# Patient Record
Sex: FEMALE | Race: WHITE | NOT HISPANIC OR LATINO | Employment: OTHER | URBAN - METROPOLITAN AREA
[De-identification: names, ages, dates, MRNs, and addresses within clinical notes are randomized per-mention and may not be internally consistent; named-entity substitution may affect disease eponyms.]

---

## 2017-03-21 ENCOUNTER — ALLSCRIPTS OFFICE VISIT (OUTPATIENT)
Dept: OTHER | Facility: OTHER | Age: 77
End: 2017-03-21

## 2017-03-21 DIAGNOSIS — I35.0 NONRHEUMATIC AORTIC VALVE STENOSIS: ICD-10-CM

## 2017-03-21 DIAGNOSIS — Z01.810 ENCOUNTER FOR PREPROCEDURAL CARDIOVASCULAR EXAMINATION: ICD-10-CM

## 2017-03-29 ENCOUNTER — HOSPITAL ENCOUNTER (OUTPATIENT)
Dept: NON INVASIVE DIAGNOSTICS | Facility: CLINIC | Age: 77
Discharge: HOME | End: 2017-03-29
Payer: MEDICARE

## 2017-03-29 DIAGNOSIS — I35.0 NONRHEUMATIC AORTIC VALVE STENOSIS: ICD-10-CM

## 2017-03-29 DIAGNOSIS — Z01.810 ENCOUNTER FOR PREPROCEDURAL CARDIOVASCULAR EXAMINATION: ICD-10-CM

## 2017-03-29 PROCEDURE — 93308 TTE F-UP OR LMTD: CPT

## 2017-03-30 ENCOUNTER — GENERIC CONVERSION - ENCOUNTER (OUTPATIENT)
Dept: OTHER | Facility: OTHER | Age: 77
End: 2017-03-30

## 2017-06-21 ENCOUNTER — ALLSCRIPTS OFFICE VISIT (OUTPATIENT)
Dept: OTHER | Facility: OTHER | Age: 77
End: 2017-06-21

## 2017-09-05 ENCOUNTER — APPOINTMENT (EMERGENCY)
Dept: RADIOLOGY | Facility: HOSPITAL | Age: 77
End: 2017-09-05
Payer: MEDICARE

## 2017-09-05 ENCOUNTER — HOSPITAL ENCOUNTER (EMERGENCY)
Facility: HOSPITAL | Age: 77
Discharge: HOME | End: 2017-09-05
Attending: EMERGENCY MEDICINE | Admitting: EMERGENCY MEDICINE
Payer: MEDICARE

## 2017-09-05 VITALS
SYSTOLIC BLOOD PRESSURE: 157 MMHG | BODY MASS INDEX: 49.99 KG/M2 | OXYGEN SATURATION: 96 % | WEIGHT: 216 LBS | RESPIRATION RATE: 16 BRPM | TEMPERATURE: 97.8 F | DIASTOLIC BLOOD PRESSURE: 74 MMHG | HEIGHT: 55 IN | HEART RATE: 67 BPM

## 2017-09-05 DIAGNOSIS — T14.8XXA MUSCLE STRAIN: ICD-10-CM

## 2017-09-05 DIAGNOSIS — M25.519 SHOULDER PAIN: Primary | ICD-10-CM

## 2017-09-05 DIAGNOSIS — R03.0 ELEVATED BLOOD PRESSURE READING: ICD-10-CM

## 2017-09-05 LAB
ALBUMIN SERPL BCP-MCNC: 3.4 G/DL (ref 3.5–5)
ALP SERPL-CCNC: 78 U/L (ref 46–116)
ALT SERPL W P-5'-P-CCNC: 24 U/L (ref 12–78)
ANION GAP SERPL CALCULATED.3IONS-SCNC: 9 MMOL/L (ref 4–13)
AST SERPL W P-5'-P-CCNC: 18 U/L (ref 5–45)
BASOPHILS # BLD AUTO: 0.05 THOUSANDS/ΜL (ref 0–0.1)
BASOPHILS NFR BLD AUTO: 1 % (ref 0–1)
BILIRUB SERPL-MCNC: 0.4 MG/DL (ref 0.2–1)
BUN SERPL-MCNC: 14 MG/DL (ref 5–25)
CALCIUM SERPL-MCNC: 9.8 MG/DL (ref 8.3–10.1)
CHLORIDE SERPL-SCNC: 104 MMOL/L (ref 100–108)
CO2 SERPL-SCNC: 28 MMOL/L (ref 21–32)
CREAT SERPL-MCNC: 0.8 MG/DL (ref 0.6–1.3)
EOSINOPHIL # BLD AUTO: 0.48 THOUSAND/ΜL (ref 0–0.61)
EOSINOPHIL NFR BLD AUTO: 5 % (ref 0–6)
ERYTHROCYTE [DISTWIDTH] IN BLOOD BY AUTOMATED COUNT: 18.7 % (ref 11.6–15.1)
GFR SERPL CREATININE-BSD FRML MDRD: 71 ML/MIN/1.73SQ M
GLUCOSE SERPL-MCNC: 110 MG/DL (ref 65–140)
HCT VFR BLD AUTO: 39 % (ref 34.8–46.1)
HGB BLD-MCNC: 12 G/DL (ref 11.5–15.4)
LIPASE SERPL-CCNC: 74 U/L (ref 73–393)
LYMPHOCYTES # BLD AUTO: 1.81 THOUSANDS/ΜL (ref 0.6–4.47)
LYMPHOCYTES NFR BLD AUTO: 19 % (ref 14–44)
MCH RBC QN AUTO: 24.5 PG (ref 26.8–34.3)
MCHC RBC AUTO-ENTMCNC: 30.8 G/DL (ref 31.4–37.4)
MCV RBC AUTO: 80 FL (ref 82–98)
MONOCYTES # BLD AUTO: 1 THOUSAND/ΜL (ref 0.17–1.22)
MONOCYTES NFR BLD AUTO: 10 % (ref 4–12)
NEUTROPHILS # BLD AUTO: 6.41 THOUSANDS/ΜL (ref 1.85–7.62)
NEUTS SEG NFR BLD AUTO: 66 % (ref 43–75)
NRBC BLD AUTO-RTO: 0 /100 WBCS
PLATELET # BLD AUTO: 356 THOUSANDS/UL (ref 149–390)
PMV BLD AUTO: 9.3 FL (ref 8.9–12.7)
POTASSIUM SERPL-SCNC: 3.9 MMOL/L (ref 3.5–5.3)
PROT SERPL-MCNC: 7.3 G/DL (ref 6.4–8.2)
RBC # BLD AUTO: 4.9 MILLION/UL (ref 3.81–5.12)
SODIUM SERPL-SCNC: 141 MMOL/L (ref 136–145)
TROPONIN I SERPL-MCNC: <0.02 NG/ML
TROPONIN I SERPL-MCNC: <0.02 NG/ML
WBC # BLD AUTO: 9.78 THOUSAND/UL (ref 4.31–10.16)

## 2017-09-05 PROCEDURE — 36415 COLL VENOUS BLD VENIPUNCTURE: CPT | Performed by: EMERGENCY MEDICINE

## 2017-09-05 PROCEDURE — 99285 EMERGENCY DEPT VISIT HI MDM: CPT

## 2017-09-05 PROCEDURE — 93005 ELECTROCARDIOGRAM TRACING: CPT | Performed by: EMERGENCY MEDICINE

## 2017-09-05 PROCEDURE — 71020 HB CHEST X-RAY 2VW FRONTAL&LATL: CPT

## 2017-09-05 PROCEDURE — 85025 COMPLETE CBC W/AUTO DIFF WBC: CPT | Performed by: EMERGENCY MEDICINE

## 2017-09-05 PROCEDURE — 84484 ASSAY OF TROPONIN QUANT: CPT | Performed by: EMERGENCY MEDICINE

## 2017-09-05 PROCEDURE — 80053 COMPREHEN METABOLIC PANEL: CPT | Performed by: EMERGENCY MEDICINE

## 2017-09-05 PROCEDURE — 83690 ASSAY OF LIPASE: CPT | Performed by: EMERGENCY MEDICINE

## 2017-09-05 RX ORDER — IBUPROFEN 400 MG/1
TABLET ORAL
Status: COMPLETED
Start: 2017-09-05 | End: 2017-09-05

## 2017-09-05 RX ADMIN — IBUPROFEN 400 MG: 400 TABLET ORAL at 13:35

## 2017-09-06 LAB
ATRIAL RATE: 71 BPM
P AXIS: 44 DEGREES
PR INTERVAL: 194 MS
QRS AXIS: -14 DEGREES
QRSD INTERVAL: 84 MS
QT INTERVAL: 394 MS
QTC INTERVAL: 428 MS
T WAVE AXIS: 57 DEGREES
VENTRICULAR RATE: 71 BPM

## 2017-10-29 ENCOUNTER — APPOINTMENT (EMERGENCY)
Dept: RADIOLOGY | Facility: HOSPITAL | Age: 77
End: 2017-10-29
Payer: MEDICARE

## 2017-10-29 ENCOUNTER — APPOINTMENT (EMERGENCY)
Dept: CT IMAGING | Facility: HOSPITAL | Age: 77
End: 2017-10-29
Payer: MEDICARE

## 2017-10-29 ENCOUNTER — HOSPITAL ENCOUNTER (OUTPATIENT)
Facility: HOSPITAL | Age: 77
Setting detail: OBSERVATION
End: 2017-10-29
Attending: EMERGENCY MEDICINE | Admitting: FAMILY MEDICINE
Payer: MEDICARE

## 2017-10-29 ENCOUNTER — HOSPITAL ENCOUNTER (OUTPATIENT)
Facility: HOSPITAL | Age: 77
Setting detail: OBSERVATION
Discharge: HOME | End: 2017-10-30
Attending: INTERNAL MEDICINE | Admitting: INTERNAL MEDICINE
Payer: MEDICARE

## 2017-10-29 VITALS
RESPIRATION RATE: 20 BRPM | HEART RATE: 74 BPM | BODY MASS INDEX: 49.74 KG/M2 | WEIGHT: 214.95 LBS | DIASTOLIC BLOOD PRESSURE: 79 MMHG | TEMPERATURE: 97.9 F | OXYGEN SATURATION: 94 % | SYSTOLIC BLOOD PRESSURE: 167 MMHG | HEIGHT: 55 IN

## 2017-10-29 DIAGNOSIS — R77.8 ELEVATED TROPONIN I LEVEL: Primary | ICD-10-CM

## 2017-10-29 DIAGNOSIS — R07.9 CHEST PAIN: ICD-10-CM

## 2017-10-29 DIAGNOSIS — R06.02 SHORTNESS OF BREATH: Primary | ICD-10-CM

## 2017-10-29 PROBLEM — E03.9 HYPOTHYROID: Chronic | Status: ACTIVE | Noted: 2017-10-29

## 2017-10-29 PROBLEM — K21.9 GERD (GASTROESOPHAGEAL REFLUX DISEASE): Chronic | Status: ACTIVE | Noted: 2017-10-29

## 2017-10-29 LAB
ALBUMIN SERPL BCP-MCNC: 3.2 G/DL (ref 3.5–5)
ALP SERPL-CCNC: 97 U/L (ref 46–116)
ALT SERPL W P-5'-P-CCNC: 32 U/L (ref 12–78)
ANION GAP SERPL CALCULATED.3IONS-SCNC: 9 MMOL/L (ref 4–13)
AST SERPL W P-5'-P-CCNC: 28 U/L (ref 5–45)
BASOPHILS # BLD AUTO: 0.04 THOUSANDS/ΜL (ref 0–0.1)
BASOPHILS NFR BLD AUTO: 0 % (ref 0–1)
BILIRUB SERPL-MCNC: 0.3 MG/DL (ref 0.2–1)
BUN SERPL-MCNC: 22 MG/DL (ref 5–25)
CALCIUM SERPL-MCNC: 9.9 MG/DL (ref 8.3–10.1)
CHLORIDE SERPL-SCNC: 107 MMOL/L (ref 100–108)
CO2 SERPL-SCNC: 28 MMOL/L (ref 21–32)
CREAT SERPL-MCNC: 0.73 MG/DL (ref 0.6–1.3)
DEPRECATED D DIMER PPP: 716 NG/ML (FEU) (ref 0–424)
EOSINOPHIL # BLD AUTO: 0.11 THOUSAND/ΜL (ref 0–0.61)
EOSINOPHIL NFR BLD AUTO: 1 % (ref 0–6)
ERYTHROCYTE [DISTWIDTH] IN BLOOD BY AUTOMATED COUNT: 18.2 % (ref 11.6–15.1)
GFR SERPL CREATININE-BSD FRML MDRD: 80 ML/MIN/1.73SQ M
GLUCOSE SERPL-MCNC: 124 MG/DL (ref 65–140)
HCT VFR BLD AUTO: 40.8 % (ref 34.8–46.1)
HGB BLD-MCNC: 12.6 G/DL (ref 11.5–15.4)
LYMPHOCYTES # BLD AUTO: 2.7 THOUSANDS/ΜL (ref 0.6–4.47)
LYMPHOCYTES NFR BLD AUTO: 23 % (ref 14–44)
MCH RBC QN AUTO: 24.8 PG (ref 26.8–34.3)
MCHC RBC AUTO-ENTMCNC: 30.9 G/DL (ref 31.4–37.4)
MCV RBC AUTO: 80 FL (ref 82–98)
MONOCYTES # BLD AUTO: 1.4 THOUSAND/ΜL (ref 0.17–1.22)
MONOCYTES NFR BLD AUTO: 12 % (ref 4–12)
NEUTROPHILS # BLD AUTO: 7.63 THOUSANDS/ΜL (ref 1.85–7.62)
NEUTS SEG NFR BLD AUTO: 64 % (ref 43–75)
NRBC BLD AUTO-RTO: 0 /100 WBCS
NT-PROBNP SERPL-MCNC: 622 PG/ML
NT-PROBNP SERPL-MCNC: 735 PG/ML
PLATELET # BLD AUTO: 395 THOUSANDS/UL (ref 149–390)
PLATELET # BLD AUTO: 415 THOUSANDS/UL (ref 149–390)
PMV BLD AUTO: 9.4 FL (ref 8.9–12.7)
PMV BLD AUTO: 9.5 FL (ref 8.9–12.7)
POTASSIUM SERPL-SCNC: 3.6 MMOL/L (ref 3.5–5.3)
PROT SERPL-MCNC: 7.5 G/DL (ref 6.4–8.2)
RBC # BLD AUTO: 5.08 MILLION/UL (ref 3.81–5.12)
SODIUM SERPL-SCNC: 144 MMOL/L (ref 136–145)
TROPONIN I SERPL-MCNC: 0.04 NG/ML
TROPONIN I SERPL-MCNC: 0.05 NG/ML
TROPONIN I SERPL-MCNC: <0.02 NG/ML
WBC # BLD AUTO: 11.94 THOUSAND/UL (ref 4.31–10.16)

## 2017-10-29 PROCEDURE — 71020 HB CHEST X-RAY 2VW FRONTAL&LATL: CPT

## 2017-10-29 PROCEDURE — 99285 EMERGENCY DEPT VISIT HI MDM: CPT

## 2017-10-29 PROCEDURE — 85025 COMPLETE CBC W/AUTO DIFF WBC: CPT | Performed by: EMERGENCY MEDICINE

## 2017-10-29 PROCEDURE — 83880 ASSAY OF NATRIURETIC PEPTIDE: CPT | Performed by: PHYSICIAN ASSISTANT

## 2017-10-29 PROCEDURE — 83880 ASSAY OF NATRIURETIC PEPTIDE: CPT | Performed by: EMERGENCY MEDICINE

## 2017-10-29 PROCEDURE — 93005 ELECTROCARDIOGRAM TRACING: CPT | Performed by: EMERGENCY MEDICINE

## 2017-10-29 PROCEDURE — 71275 CT ANGIOGRAPHY CHEST: CPT

## 2017-10-29 PROCEDURE — 93005 ELECTROCARDIOGRAM TRACING: CPT

## 2017-10-29 PROCEDURE — 84484 ASSAY OF TROPONIN QUANT: CPT | Performed by: EMERGENCY MEDICINE

## 2017-10-29 PROCEDURE — 85379 FIBRIN DEGRADATION QUANT: CPT | Performed by: EMERGENCY MEDICINE

## 2017-10-29 PROCEDURE — 80053 COMPREHEN METABOLIC PANEL: CPT | Performed by: EMERGENCY MEDICINE

## 2017-10-29 PROCEDURE — 85049 AUTOMATED PLATELET COUNT: CPT | Performed by: PHYSICIAN ASSISTANT

## 2017-10-29 PROCEDURE — 36415 COLL VENOUS BLD VENIPUNCTURE: CPT | Performed by: EMERGENCY MEDICINE

## 2017-10-29 PROCEDURE — 84484 ASSAY OF TROPONIN QUANT: CPT | Performed by: PHYSICIAN ASSISTANT

## 2017-10-29 RX ORDER — ASPIRIN 81 MG/1
324 TABLET, CHEWABLE ORAL ONCE
Status: COMPLETED | OUTPATIENT
Start: 2017-10-29 | End: 2017-10-29

## 2017-10-29 RX ORDER — PANTOPRAZOLE SODIUM 20 MG/1
20 TABLET, DELAYED RELEASE ORAL
Status: DISCONTINUED | OUTPATIENT
Start: 2017-10-30 | End: 2017-10-31 | Stop reason: HOSPADM

## 2017-10-29 RX ORDER — PHENOL 1.4 %
1 AEROSOL, SPRAY (ML) MUCOUS MEMBRANE 2 TIMES DAILY
COMMUNITY
End: 2018-08-30 | Stop reason: ALTCHOICE

## 2017-10-29 RX ORDER — ASPIRIN 325 MG
325 TABLET ORAL ONCE
Status: COMPLETED | OUTPATIENT
Start: 2017-10-29 | End: 2017-10-29

## 2017-10-29 RX ORDER — LEVOTHYROXINE SODIUM 0.05 MG/1
50 TABLET ORAL DAILY
COMMUNITY
End: 2019-01-30 | Stop reason: SDUPTHER

## 2017-10-29 RX ORDER — ALBUTEROL SULFATE 2.5 MG/3ML
2.5 SOLUTION RESPIRATORY (INHALATION) EVERY 6 HOURS PRN
Status: DISCONTINUED | OUTPATIENT
Start: 2017-10-29 | End: 2017-10-31 | Stop reason: HOSPADM

## 2017-10-29 RX ORDER — VITAMIN B COMPLEX
1 CAPSULE ORAL 2 TIMES DAILY
COMMUNITY

## 2017-10-29 RX ORDER — LORATADINE 10 MG/1
10 TABLET ORAL DAILY
COMMUNITY
End: 2019-10-15 | Stop reason: SDUPTHER

## 2017-10-29 RX ORDER — LEVOTHYROXINE SODIUM 0.05 MG/1
50 TABLET ORAL
Status: DISCONTINUED | OUTPATIENT
Start: 2017-10-30 | End: 2017-10-31 | Stop reason: HOSPADM

## 2017-10-29 RX ORDER — MAGNESIUM HYDROXIDE/ALUMINUM HYDROXICE/SIMETHICONE 120; 1200; 1200 MG/30ML; MG/30ML; MG/30ML
30 SUSPENSION ORAL ONCE
Status: COMPLETED | OUTPATIENT
Start: 2017-10-29 | End: 2017-10-29

## 2017-10-29 RX ORDER — OMEPRAZOLE 20 MG/1
40 TABLET, DELAYED RELEASE ORAL 2 TIMES DAILY
COMMUNITY
End: 2018-07-03

## 2017-10-29 RX ORDER — SUCRALFATE ORAL 1 G/10ML
1000 SUSPENSION ORAL EVERY 6 HOURS SCHEDULED
Status: DISCONTINUED | OUTPATIENT
Start: 2017-10-30 | End: 2017-10-31 | Stop reason: HOSPADM

## 2017-10-29 RX ORDER — CALCIUM CARBONATE 500(1250)
1 TABLET ORAL
Status: DISCONTINUED | OUTPATIENT
Start: 2017-10-30 | End: 2017-10-31 | Stop reason: HOSPADM

## 2017-10-29 RX ORDER — MAGNESIUM HYDROXIDE/ALUMINUM HYDROXICE/SIMETHICONE 120; 1200; 1200 MG/30ML; MG/30ML; MG/30ML
30 SUSPENSION ORAL EVERY 4 HOURS PRN
Status: DISCONTINUED | OUTPATIENT
Start: 2017-10-29 | End: 2017-10-31 | Stop reason: HOSPADM

## 2017-10-29 RX ORDER — ACETAMINOPHEN 325 MG/1
650 TABLET ORAL EVERY 6 HOURS PRN
Status: DISCONTINUED | OUTPATIENT
Start: 2017-10-29 | End: 2017-10-31 | Stop reason: HOSPADM

## 2017-10-29 RX ORDER — ONDANSETRON 2 MG/ML
4 INJECTION INTRAMUSCULAR; INTRAVENOUS EVERY 6 HOURS PRN
Status: DISCONTINUED | OUTPATIENT
Start: 2017-10-29 | End: 2017-10-31 | Stop reason: HOSPADM

## 2017-10-29 RX ORDER — LORATADINE 10 MG/1
10 TABLET ORAL DAILY
Status: DISCONTINUED | OUTPATIENT
Start: 2017-10-30 | End: 2017-10-31 | Stop reason: HOSPADM

## 2017-10-29 RX ADMIN — ASPIRIN 81 MG 324 MG: 81 TABLET ORAL at 18:25

## 2017-10-29 RX ADMIN — ALUMINUM HYDROXIDE, MAGNESIUM HYDROXIDE, AND SIMETHICONE 30 ML: 200; 200; 20 SUSPENSION ORAL at 11:15

## 2017-10-29 RX ADMIN — ALUMINUM HYDROXIDE, MAGNESIUM HYDROXIDE, AND SIMETHICONE 30 ML: 200; 200; 20 SUSPENSION ORAL at 23:03

## 2017-10-29 RX ADMIN — IOHEXOL 85 ML: 350 INJECTION, SOLUTION INTRAVENOUS at 12:18

## 2017-10-29 RX ADMIN — SUCRALFATE 1000 MG: 1 SUSPENSION ORAL at 23:32

## 2017-10-29 RX ADMIN — ASPIRIN 325 MG: 325 TABLET ORAL at 23:03

## 2017-10-29 NOTE — ED PROVIDER NOTES
History  Chief Complaint   Patient presents with    Chest Pain     pt states "when i breaths, it hurst and burns in my throat and my chest " Pt was seen at urgent care for an ear problem and was put on prednisone  Pt states chest pain began this morning  Pt has hx of  "leaky valve "      A 58-year-old female presents for evaluation of shortness of breath that started shortly prior to presentation  The patient complains of substernal, central chest pain that is described as a burning sensation  Patient notes that this radiates towards her throat and was associated with shortness of breath  Patient denies diaphoresis or nausea  No numbness or weakness  Patient states that this pain was mild to moderate intensity  It lasted for about 10 to 15 minutes before resolving spontaneously  She is not currently having chest pain  She has no history of coronary artery disease  She does have a history of hypothyroidism  She is concerned that she was recently started on prednisone for a red ear    She is afraid that her symptoms may be a side effect of prednisone  She has been tolerating solids and liquids by mouth  She has been urinating normally and having normal bowel movements  History provided by:  Patient   used: No    Chest Pain   Pain location:  Substernal area  Pain quality: burning    Pain radiates to:  Does not radiate  Pain radiates to the back: no    Pain severity:  Mild  Onset quality:  Sudden  Timing:  Intermittent  Chronicity:  New  Relieved by:  Nothing  Worsened by:  Exertion  Ineffective treatments:  Rest  Associated symptoms: shortness of breath    Associated symptoms: no abdominal pain, no back pain, no cough, no dizziness, no fatigue, no fever, no headache, no nausea, no numbness and not vomiting        Prior to Admission Medications   Prescriptions Last Dose Informant Patient Reported?  Taking?   levothyroxine 50 mcg tablet   Yes Yes   Sig: Take 50 mcg by mouth daily loratadine (CLARITIN) 10 mg tablet   Yes Yes   Sig: Take 10 mg by mouth daily      Facility-Administered Medications: None       Past Medical History:   Diagnosis Date    Cardiac disease     "leaky valve"    Disease of thyroid gland     Murmur, cardiac        Past Surgical History:   Procedure Laterality Date    CHOLECYSTECTOMY      JOINT REPLACEMENT      b/l knee        History reviewed  No pertinent family history  I have reviewed and agree with the history as documented  Social History   Substance Use Topics    Smoking status: Never Smoker    Smokeless tobacco: Not on file    Alcohol use No        Review of Systems   Constitutional: Negative for activity change, appetite change, fatigue, fever and unexpected weight change  HENT: Negative for congestion, hearing loss, rhinorrhea, sore throat and voice change  Eyes: Negative for pain and visual disturbance  Respiratory: Positive for shortness of breath  Negative for cough and chest tightness  Cardiovascular: Positive for chest pain  Gastrointestinal: Negative for abdominal pain, blood in stool, diarrhea, nausea and vomiting  Endocrine: Negative for polyphagia and polyuria  Genitourinary: Negative for difficulty urinating, dysuria, flank pain, frequency and urgency  Musculoskeletal: Negative for back pain, gait problem, neck pain and neck stiffness  Skin: Negative for color change and rash  Allergic/Immunologic: Negative for immunocompromised state  Neurological: Negative for dizziness, syncope, speech difficulty, light-headedness, numbness and headaches  Hematological: Does not bruise/bleed easily  Psychiatric/Behavioral: Negative for confusion and suicidal ideas         Physical Exam  ED Triage Vitals   Temperature Pulse Respirations Blood Pressure SpO2   10/29/17 0948 10/29/17 0942 10/29/17 0942 10/29/17 0948 10/29/17 0942   97 9 °F (36 6 °C) 98 20 165/79 93 %      Temp Source Heart Rate Source Patient Position - Orthostatic VS BP Location FiO2 (%)   10/29/17 0948 10/29/17 0942 10/29/17 0942 10/29/17 0942 --   Oral Monitor Lying Right arm       Pain Score       --                  Orthostatic Vital Signs  Vitals:    10/29/17 0942 10/29/17 0948 10/29/17 1330 10/29/17 1730   BP:  165/79 160/78 167/79   Pulse: 98  63 74   Patient Position - Orthostatic VS: Lying          Physical Exam   Constitutional: She is oriented to person, place, and time  She appears well-developed and well-nourished  HENT:   Head: Normocephalic and atraumatic  Eyes: Conjunctivae and EOM are normal  Pupils are equal, round, and reactive to light  No scleral icterus  Neck: Normal range of motion  Neck supple  No tracheal deviation present  Cardiovascular: Normal rate and regular rhythm  Pulmonary/Chest: Effort normal and breath sounds normal  No respiratory distress  Abdominal: Soft  She exhibits no distension  There is no tenderness  Musculoskeletal: Normal range of motion  She exhibits no tenderness or deformity  Lymphadenopathy:     She has no cervical adenopathy  Neurological: She is alert and oriented to person, place, and time  No gross focal sensory or motor deficits   Skin: Skin is warm and dry  No rash noted  She is not diaphoretic  Psychiatric: She has a normal mood and affect  Vitals reviewed        ED Medications  Medications   aluminum-magnesium hydroxide-simethicone (MYLANTA) 200-200-20 mg/5 mL oral suspension 30 mL (30 mL Oral Given 10/29/17 1115)   iohexol (OMNIPAQUE) 350 MG/ML injection (MULTI-DOSE) 85 mL (85 mL Intravenous Given 10/29/17 1218)   aspirin chewable tablet 324 mg (324 mg Oral Given 10/29/17 1825)       Diagnostic Studies  Results Reviewed     Procedure Component Value Units Date/Time    Troponin I [[id number]]  (Abnormal) Collected:  10/29/17 1434    Lab Status:  Final result Specimen:  Blood from Arm, Right Updated:  10/29/17 1456     Troponin I 0 05 (H) ng/mL     Narrative:         [employer] analyzer 99% cutoff is > 0 04 ng/mL in network labs    o cTnI 99% cutoff is useful only when applied to patients in the clinical setting of myocardial ischemia  o cTnI 99% cutoff should be interpreted in the context of clinical history, ECG findings and possibly cardiac imaging to establish correct diagnosis  o cTnI 99% cutoff may be suggestive but clearly not indicative of a coronary event without the clinical setting of myocardial ischemia  B-type natriuretic peptide [[id number]]  (Abnormal) Collected:  10/29/17 1102    Lab Status:  Final result Specimen:  Blood from Arm, Right Updated:  10/29/17 1134     NT-proBNP 622 (H) pg/mL     Troponin I [[id number]]  (Normal) Collected:  10/29/17 1102    Lab Status:  Final result Specimen:  Blood from Arm, Right Updated:  10/29/17 1131     Troponin I 0 04 ng/mL     Narrative:         [employer] analyzer 99% cutoff is > 0 04 ng/mL in network labs    o cTnI 99% cutoff is useful only when applied to patients in the clinical setting of myocardial ischemia  o cTnI 99% cutoff should be interpreted in the context of clinical history, ECG findings and possibly cardiac imaging to establish correct diagnosis  o cTnI 99% cutoff may be suggestive but clearly not indicative of a coronary event without the clinical setting of myocardial ischemia      Comprehensive metabolic panel [[id number]]  (Abnormal) Collected:  10/29/17 1102    Lab Status:  Final result Specimen:  Blood from Arm, Right Updated:  10/29/17 1129     Sodium 144 mmol/L      Potassium 3 6 mmol/L      Chloride 107 mmol/L      CO2 28 mmol/L      Anion Gap 9 mmol/L      BUN 22 mg/dL      Creatinine 0 73 mg/dL      Glucose 124 mg/dL      Calcium 9 9 mg/dL      AST 28 U/L      ALT 32 U/L      Alkaline Phosphatase 97 U/L      Total Protein 7 5 g/dL      Albumin 3 2 (L) g/dL      Total Bilirubin 0 30 mg/dL      eGFR 80 ml/min/1 73sq m     Narrative:         National Kidney Disease Education Program recommendations are as follows:  GFR calculation is accurate only with a steady state creatinine  Chronic Kidney disease less than 60 ml/min/1 73 sq  meters  Kidney failure less than 15 ml/min/1 73 sq  meters  D-Dimer [[id number]]  (Abnormal) Collected:  10/29/17 1108    Lab Status:  Final result Specimen:  Blood Updated:  10/29/17 1126     D-Dimer, Quant 716 (H) ng/ml (FEU)     CBC and differential [[id number]]  (Abnormal) Collected:  10/29/17 1102    Lab Status:  Final result Specimen:  Blood from Arm, Right Updated:  10/29/17 1112     WBC 11 94 (H) Thousand/uL      RBC 5 08 Million/uL      Hemoglobin 12 6 g/dL      Hematocrit 40 8 %      MCV 80 (L) fL      MCH 24 8 (L) pg      MCHC 30 9 (L) g/dL      RDW 18 2 (H) %      MPV 9 5 fL      Platelets 366 (H) Thousands/uL      nRBC 0 /100 WBCs      Neutrophils Relative 64 %      Lymphocytes Relative 23 %      Monocytes Relative 12 %      Eosinophils Relative 1 %      Basophils Relative 0 %      Neutrophils Absolute 7 63 (H) Thousands/µL      Lymphocytes Absolute 2 70 Thousands/µL      Monocytes Absolute 1 40 (H) Thousand/µL      Eosinophils Absolute 0 11 Thousand/µL      Basophils Absolute 0 04 Thousands/µL                  CTA ED chest PE study   Final Result by Qiana Hernandez MD (10/29 1330)      No pulmonary embolus  Workstation performed: [id number]         XR chest 2 views   Final Result by Lynnda Merlin, MD (10/29 1140)      No active pulmonary disease  Workstation performed: [id number]                    Procedures  Procedures       Phone Contacts  ED Phone Contact    ED Course  ED Course                                MDM  Number of Diagnoses or Management Options  Chest pain: new and requires workup  Elevated troponin I level: new and requires workup  Diagnosis management comments: 62-year-old female presented for evaluation of chest pain    Initial EKG showed no acute ST changes initial troponin was at the upper limits of normal  A D-dimer was noted to be elevated and so CTA of the chest was performed to rule out pulmonary embolism  This was unremarkable  A repeat troponin at a 2 to 3 hour interval was noted to be mildly elevated  Because of this the patient was admitted to the hospital for observation formal consult by cardiology    At her current       Amount and/or Complexity of Data Reviewed  Clinical lab tests: reviewed and ordered  Tests in the radiology section of CPT®: reviewed and ordered  Review and summarize past medical records: yes  Independent visualization of images, tracings, or specimens: yes      CritCare Time    Disposition  Final diagnoses:   Elevated troponin I level   Chest pain     Time reflects when diagnosis was documented in both MDM as applicable and the Disposition within this note     Time User Action Codes Description Comment    10/29/2017  3:43 PM Tami Lopez SREEKANTH Add [R74 8] Elevated troponin I level     10/29/2017  3:43 PM Kevin Mele Add [R07 9] Chest pain       ED Disposition     ED Disposition Condition Comment    Transfer to Another Facility  Transfer to [address] [place] [address] Most Recent Value   Patient Condition  The patient has been stabilized such that within reasonable medical probability, no material deterioration of the patient condition or the condition of the unborn child(tuyet) is likely to result from the transfer   Reason for Transfer  No bed available at level of patient's needs   Benefits of Transfer  Patient preference   Risks of Transfer  Loss of IV, Increased discomfort during transfer   Accepting Physician  [address] [place] [address] Name, Earl Mckee      RN Documentation    [address] Name, Earl Salter      Follow-up Information    None       Discharge Medication List as of 10/29/2017  7:22 PM      CONTINUE these medications which have NOT CHANGED    Details   levothyroxine 50 mcg tablet Take 50 mcg by mouth daily, Historical Med      loratadine (CLARITIN) 10 mg tablet Take 10 mg by mouth daily, Historical Med           No discharge procedures on file      ED Provider  Electronically Signed by           Ketty Figueroa MD  11/20/17 3442

## 2017-10-29 NOTE — EMTALA/ACUTE CARE TRANSFER
[address] [address]  Dept: [phone]      EMTALA TRANSFER CONSENT    NAME Yousif Vo                                         1940                              MRN [id number]    I have been informed of my rights regarding examination, treatment, and transfer   by Dr Nika Fernandez MD    Benefits: Patient preference    Risks: Loss of IV, Increased discomfort during transfer          I authorize the performance of emergency medical procedures and treatments upon me in both transit and upon arrival at the receiving facility  Additionally, I authorize the release of any and all medical records to the receiving facility and request they be transported with me, if possible  I understand that the safest mode of transportation during a medical emergency is an ambulance and that the Hospital advocates the use of this mode of transport  Risks of traveling to the receiving facility by car, including absence of medical control, life sustaining equipment, such as oxygen, and medical personnel has been explained to me and I fully understand them  (KATHLEEN CORRECT BOX BELOW)  [  ]  I consent to the stated transfer and to be transported by ambulance/helicopter  [  ]  I consent to the stated transfer, but refuse transportation by ambulance and accept full responsibility for my transportation by car    I understand the risks of non-ambulance transfers and I exonerate the Hospital and its staff from any deterioration in my condition that results from this refusal     X___________________________________________    DATE  10/29/17  TIME________  Signature of patient or legally responsible individual signing on patient behalf           RELATIONSHIP TO PATIENT_________________________          Provider Certification    NAME Yousif Vo                                         1940                              MRN [id number]    A medical screening exam was performed on the above named patient  Based on the examination:    Condition Necessitating Transfer The primary encounter diagnosis was Elevated troponin I level  A diagnosis of Chest pain was also pertinent to this visit  Patient Condition: The patient has been stabilized such that within reasonable medical probability, no material deterioration of the patient condition or the condition of the unborn child(tuyet) is likely to result from the transfer    Reason for Transfer: No bed available at level of patient's needs    Transfer Requirements: [place]   · Space available and qualified personnel available for treatment as acknowledged by    · Agreed to accept transfer and to provide appropriate medical treatment as acknowledged by       Jacqueline Becerril  · Appropriate medical records of the examination and treatment of the patient are provided at the time of transfer   [facility], [address] _______  · Transfer will be performed by qualified personnel from    and appropriate transfer equipment as required, including the use of necessary and appropriate life support measures  Provider Certification: I have examined the patient and explained the following risks and benefits of being transferred/refusing transfer to the patient/family:         Based on these reasonable risks and benefits to the patient and/or the unborn child(tuyet), and based upon the information available at the time of the patients examination, I certify that the medical benefits reasonably to be expected from the provision of appropriate medical treatments at another medical facility outweigh the increasing risks, if any, to the individuals medical condition, and in the case of labor to the unborn child, from effecting the transfer      X____________________________________________ DATE 10/29/17        TIME_______      ORIGINAL - SEND TO MEDICAL RECORDS   COPY - SEND WITH PATIENT DURING TRANSFER

## 2017-10-29 NOTE — ED NOTES
Attempted to call report at this time  Told they would call back due to change of shift        Alice Pina RN  10/29/17 9744

## 2017-10-29 NOTE — Clinical Note
Case was discussed with [facility] and the patient's admission status was agreed to be Admission Status: observation status to the service of Dr Dottie Lane

## 2017-10-30 VITALS
SYSTOLIC BLOOD PRESSURE: 118 MMHG | TEMPERATURE: 97.8 F | RESPIRATION RATE: 22 BRPM | BODY MASS INDEX: 48.78 KG/M2 | HEIGHT: 55 IN | DIASTOLIC BLOOD PRESSURE: 71 MMHG | HEART RATE: 71 BPM | WEIGHT: 210.76 LBS | OXYGEN SATURATION: 94 %

## 2017-10-30 LAB
ANION GAP SERPL CALCULATED.3IONS-SCNC: 8 MMOL/L (ref 4–13)
ATRIAL RATE: 241 BPM
ATRIAL RATE: 99 BPM
BUN SERPL-MCNC: 27 MG/DL (ref 5–25)
CALCIUM SERPL-MCNC: 9.3 MG/DL (ref 8.3–10.1)
CHLORIDE SERPL-SCNC: 105 MMOL/L (ref 100–108)
CO2 SERPL-SCNC: 28 MMOL/L (ref 21–32)
CREAT SERPL-MCNC: 0.69 MG/DL (ref 0.6–1.3)
ERYTHROCYTE [DISTWIDTH] IN BLOOD BY AUTOMATED COUNT: 18.7 % (ref 11.6–15.1)
GFR SERPL CREATININE-BSD FRML MDRD: 84 ML/MIN/1.73SQ M
GLUCOSE P FAST SERPL-MCNC: 95 MG/DL (ref 65–99)
GLUCOSE SERPL-MCNC: 95 MG/DL (ref 65–140)
HCT VFR BLD AUTO: 41.5 % (ref 34.8–46.1)
HGB BLD-MCNC: 12.5 G/DL (ref 11.5–15.4)
MCH RBC QN AUTO: 24.7 PG (ref 26.8–34.3)
MCHC RBC AUTO-ENTMCNC: 30.1 G/DL (ref 31.4–37.4)
MCV RBC AUTO: 82 FL (ref 82–98)
P AXIS: 55 DEGREES
PLATELET # BLD AUTO: 387 THOUSANDS/UL (ref 149–390)
PMV BLD AUTO: 9.7 FL (ref 8.9–12.7)
POTASSIUM SERPL-SCNC: 4 MMOL/L (ref 3.5–5.3)
PR INTERVAL: 218 MS
QRS AXIS: -15 DEGREES
QRS AXIS: -81 DEGREES
QRSD INTERVAL: 80 MS
QRSD INTERVAL: 82 MS
QT INTERVAL: 338 MS
QT INTERVAL: 380 MS
QTC INTERVAL: 427 MS
QTC INTERVAL: 433 MS
RBC # BLD AUTO: 5.07 MILLION/UL (ref 3.81–5.12)
SODIUM SERPL-SCNC: 141 MMOL/L (ref 136–145)
T WAVE AXIS: 167 DEGREES
T WAVE AXIS: 97 DEGREES
TROPONIN I SERPL-MCNC: 0.02 NG/ML
VENTRICULAR RATE: 76 BPM
VENTRICULAR RATE: 99 BPM
WBC # BLD AUTO: 8.4 THOUSAND/UL (ref 4.31–10.16)

## 2017-10-30 PROCEDURE — 84484 ASSAY OF TROPONIN QUANT: CPT | Performed by: PHYSICIAN ASSISTANT

## 2017-10-30 PROCEDURE — 85027 COMPLETE CBC AUTOMATED: CPT | Performed by: PHYSICIAN ASSISTANT

## 2017-10-30 PROCEDURE — 94760 N-INVAS EAR/PLS OXIMETRY 1: CPT

## 2017-10-30 PROCEDURE — 80048 BASIC METABOLIC PNL TOTAL CA: CPT | Performed by: PHYSICIAN ASSISTANT

## 2017-10-30 PROCEDURE — 94664 DEMO&/EVAL PT USE INHALER: CPT

## 2017-10-30 RX ORDER — ALBUTEROL SULFATE 2.5 MG/3ML
2.5 SOLUTION RESPIRATORY (INHALATION) EVERY 6 HOURS PRN
Qty: 75 ML | Refills: 0 | Status: SHIPPED | OUTPATIENT
Start: 2017-10-30 | End: 2018-10-05

## 2017-10-30 RX ORDER — FUROSEMIDE 20 MG/1
20 TABLET ORAL ONCE
Status: COMPLETED | OUTPATIENT
Start: 2017-10-30 | End: 2017-10-30

## 2017-10-30 RX ORDER — SUCRALFATE ORAL 1 G/10ML
1000 SUSPENSION ORAL EVERY 6 HOURS SCHEDULED
Qty: 420 ML | Refills: 0 | Status: SHIPPED | OUTPATIENT
Start: 2017-10-30 | End: 2017-12-02

## 2017-10-30 RX ORDER — AMLODIPINE BESYLATE 5 MG/1
5 TABLET ORAL DAILY
Status: DISCONTINUED | OUTPATIENT
Start: 2017-10-30 | End: 2017-10-31 | Stop reason: HOSPADM

## 2017-10-30 RX ORDER — MAGNESIUM HYDROXIDE/ALUMINUM HYDROXICE/SIMETHICONE 120; 1200; 1200 MG/30ML; MG/30ML; MG/30ML
30 SUSPENSION ORAL EVERY 4 HOURS PRN
Qty: 355 ML | Refills: 0 | Status: SHIPPED | OUTPATIENT
Start: 2017-10-30 | End: 2017-12-02

## 2017-10-30 RX ORDER — AMLODIPINE BESYLATE 5 MG/1
5 TABLET ORAL DAILY
Qty: 30 TABLET | Refills: 0 | Status: ON HOLD | OUTPATIENT
Start: 2017-10-31 | End: 2017-12-07

## 2017-10-30 RX ADMIN — SUCRALFATE 1000 MG: 1 SUSPENSION ORAL at 06:14

## 2017-10-30 RX ADMIN — LORATADINE 10 MG: 10 TABLET ORAL at 08:28

## 2017-10-30 RX ADMIN — LEVOTHYROXINE SODIUM 50 MCG: 50 TABLET ORAL at 06:14

## 2017-10-30 RX ADMIN — CALCIUM 1 TABLET: 500 TABLET ORAL at 08:28

## 2017-10-30 RX ADMIN — ALBUTEROL SULFATE 2.5 MG: 2.5 SOLUTION RESPIRATORY (INHALATION) at 08:31

## 2017-10-30 RX ADMIN — AMLODIPINE BESYLATE 5 MG: 5 TABLET ORAL at 11:39

## 2017-10-30 RX ADMIN — ZINC 1 TABLET: TAB ORAL at 08:28

## 2017-10-30 RX ADMIN — PANTOPRAZOLE SODIUM 20 MG: 20 TABLET, DELAYED RELEASE ORAL at 06:14

## 2017-10-30 RX ADMIN — SUCRALFATE 1000 MG: 1 SUSPENSION ORAL at 17:54

## 2017-10-30 RX ADMIN — SUCRALFATE 1000 MG: 1 SUSPENSION ORAL at 11:41

## 2017-10-30 RX ADMIN — FUROSEMIDE 20 MG: 20 TABLET ORAL at 11:39

## 2017-10-30 RX ADMIN — ENOXAPARIN SODIUM 40 MG: 40 INJECTION SUBCUTANEOUS at 08:28

## 2017-10-30 NOTE — MALNUTRITION/BMI
This medical record reflects one or more clinical indicators suggestive of malnutrition and/or morbid obesity  BMI Findings:  45-49 9    Body mass index is 48 99 kg/m²  See Nutrition note dated 10/30/17 for additional details  Completed nutrition assessment is viewable in the nutrition documentation

## 2017-10-30 NOTE — DISCHARGE INSTRUCTIONS
You should be receiving a phone call for a stress test from Derrick Callahan either later today or tomorrow  Please answer their questions and they will make an appointment for the stress test  These result will be sent to cardiologist, who will follow you outpatient  Chest Pain, Ambulatory Care   GENERAL INFORMATION:   Chest pain  can be caused by a range of conditions, from not serious to life-threatening  It may be caused by a heart attack or a blood clot in your lungs  Sometimes chest pain or pressure is caused by poor blood flow to your heart (angina)  Infection, inflammation, or a fracture in the bones or cartilage in your chest can cause pain or discomfort  Chest pain can also be a symptom of a digestive problem, such as acid reflux or a stomach ulcer  Common symptoms include the following:   · Fever or sweating     · Nausea or vomiting     · Shortness of breath     · Discomfort or pressure that spreads from your chest to your back, jaw, or arm     · A racing or slow heartbeat     · Feeling weak, tired, or faint  Seek immediate care for the following symptoms:   · Any of the following signs of a heart attack:      ¨ Squeezing, pressure, or pain in your chest that lasts longer than 5 minutes or returns    ¨ Discomfort or pain in your back, neck, jaw, stomach, or arm     ¨ Trouble breathing     ¨ Nausea or vomiting    ¨ Lightheadedness or a sudden cold sweat, especially with trouble breathing         · Chest discomfort that gets worse, even with medicine    · Coughing or vomiting blood    · Black or bloody bowel movements     · Vomiting that does not stop, or pain when you swallow  Treatment for chest pain  may include medicine to treat your symptoms while he determines the cause of your chest pain  You may also need any of the following:  · Antiplatelets , such as aspirin, help prevent blood clots  Take your antiplatelet medicine exactly as directed   These medicines make it more likely for you to bleed or bruise  If you are told to take aspirin, do not take acetaminophen or ibuprofen instead  · Prescription pain medicine  may be given  Ask how to take this medicine safely  Do not smoke: If you smoke, it is never too late to quit  Smoking increases your risk for a heart attack and other heart and lung conditions  Ask your healthcare provider for information about how to stop smoking if you need help  Follow up with your healthcare provider as directed: You may need more tests  You may be referred to a specialist, such as a cardiologist or gastroenterologist  Write down your questions so you remember to ask them during your visits  CARE AGREEMENT:   You have the right to help plan your care  Learn about your health condition and how it may be treated  Discuss treatment options with your caregivers to decide what care you want to receive  You always have the right to refuse treatment  The above information is an  only  It is not intended as medical advice for individual conditions or treatments  Talk to your doctor, nurse or pharmacist before following any medical regimen to see if it is safe and effective for you  © 2014 [address] is for End User's use only and may not be sold, redistributed or otherwise used for commercial purposes  All illustrations and images included in CareNotes® are the copyrighted property of [employer]  or [employer]  Amlodipine (By mouth)   Amlodipine (vo-RZQ-rd-peen)  Treats high blood pressure and angina (chest pain)  This medicine is a calcium channel blocker  Brand Name(s): Norvasc   There may be other brand names for this medicine  When This Medicine Should Not Be Used: This medicine is not right for everyone  Do not use it if you had an allergic reaction to amlodipine  How to Use This Medicine:   Tablet, Dissolving Tablet  · Take your medicine as directed   Your dose may need to be changed several times to find what works best for you  Take this medicine at the same time each day  · Read and follow the patient instructions that come with this medicine  Talk to your doctor or pharmacist if you have any questions  · Missed dose: Take a dose as soon as you remember  If it has been more than 12 hours since you were supposed to take your dose, skip the missed dose and take your next regular dose at the regular time  · Store the medicine in a closed container at room temperature, away from heat, moisture, and direct light  Drugs and Foods to Avoid:   Ask your doctor or pharmacist before using any other medicine, including over-the-counter medicines, vitamins, and herbal products  · Some medicines can affect how amlodipine works  Tell your doctor if you are also using any of the following:   ¨ Clarithromycin, cyclosporine, diltiazem, itraconazole, ritonavir, sildenafil, simvastatin, tacrolimus  Warnings While Using This Medicine:   · Tell your doctor if you are pregnant or breastfeeding, or if you have liver disease, heart disease, coronary artery disease, or aortic stenosis  · This medicine could lower your blood pressure too much, especially when you first use it or if you are dehydrated  Stand or sit up slowly if you feel lightheaded or dizzy  · Your doctor will check your progress and the effects of this medicine at regular visits  Keep all appointments  · Do not stop using this medicine without asking your doctor, even if you feel well  This medicine will not cure high blood pressure, but it will help keep it in normal range  You may have to take blood pressure medicine for the rest of your life  · Keep all medicine out of the reach of children  Never share your medicine with anyone    Possible Side Effects While Using This Medicine:   Call your doctor right away if you notice any of these side effects:  · Allergic reaction: Itching or hives, swelling in your face or hands, swelling or tingling in your mouth or throat, chest tightness, trouble breathing  · Lightheadedness, dizziness  · New or worsening chest pain  · Swelling in your hands, ankles, or legs  · Trouble breathing, nausea, unusual sweating, fainting  If you notice other side effects that you think are caused by this medicine, tell your doctor  Call your doctor for medical advice about side effects  You may report side effects to FDA at 6-840-FDA-7821  © 2017 [address] Information is for End User's use only and may not be sold, redistributed or otherwise used for commercial purposes  The above information is an  only  It is not intended as medical advice for individual conditions or treatments  Talk to your doctor, nurse or pharmacist before following any medical regimen to see if it is safe and effective for you  Sucralfate (By mouth)   Sucralfate ([id number])  Treats ulcers  Brand Name(s): Carafate   There may be other brand names for this medicine  When This Medicine Should Not Be Used: You should not use this medicine if you have had an allergic reaction to it  How to Use This Medicine:   Tablet, Liquid  · Your doctor will tell you how much to take and how often  · Do not stop taking the medicine unless your doctor tells you to, even if you feel better  · Take your medicine on an empty stomach  Swallow the tablet with a glass of water  · Unless your doctor tells you otherwise, take the medicine 1 hour before meals and at bedtime  · Measure the oral liquid medicine using a measuring spoon or medicine cup  · Shake the oral liquid medicine well before using  If a dose is missed:   · Take your medicine as soon as you remember that you missed your dose  · If it is nearly time for your next dose, wait until then to take your medicine and skip the missed dose  · You should not use two doses at one time    How to Store and Dispose of This Medicine:   · Keep the medicine at room temperature, away from heat, light, and moisture  Do not freeze the oral liquid  · Keep all medicine out of the reach of children  Drugs and Foods to Avoid:   Ask your doctor or pharmacist before using any other medicine, including over-the-counter medicines, vitamins, and herbal products  · Antacids may be taken one-half hour before or after taking sucralfate  · You should not take other medicines within 2 hours before or after taking sucralfate  If you need help deciding the best times to take your other medicines, ask your doctor or pharmacist   Warnings While Using This Medicine:   · If you are pregnant or breastfeeding, talk to your doctor before taking this medicine  · Check with your doctor before taking if you have kidney problems or are on dialysis  Possible Side Effects While Using This Medicine:   Call your doctor right away if you notice any of these side effects:  · Rash, hives, or itching  · Swelling of the face or mouth  If you notice these less serious side effects, talk with your doctor:   · Constipation  · Dry mouth  · Mild stomach cramps, diarrhea  · Upset stomach, gas  · Dizziness  If you notice other side effects that you think are caused by this medicine, tell your doctor  Call your doctor for medical advice about side effects  You may report side effects to FDA at -773-FDA-1088  © 2017 [address] Information is for End User's use only and may not be sold, redistributed or otherwise used for commercial purposes  The above information is an  only  It is not intended as medical advice for individual conditions or treatments  Talk to your doctor, nurse or pharmacist before following any medical regimen to see if it is safe and effective for you

## 2017-10-30 NOTE — CASE MANAGEMENT
Initial Clinical Review    Admission: Date/Time/Statement: 10/29/2017 @ 20:47  Transferred From [facility] [address] This Encounter   Procedures    Place in Observation     Standing Status:   Standing     Number of Occurrences:   1     Order Specific Question:   Admitting Physician     Answer:   Hayden Fay     Order Specific Question:   Level of Care     Answer:   Med Surg [16]     History of Illness: 67 y o  female with no significant medical history who presents with chest pain  She reports that she was going about her daily routine when she started having a burning sensation in her chest that went up her throat that started after she had had breakfast  She states that she had some difficulty breathing with the chest pain  She denied any wheezing or leg swelling  She denies any relieving factors  She did not take anything for the pain, She denies any radiation into the arm or neck  She denies any nausea, vomiting, or coughing  She denies any blurred vision, changes in vision, or dizziness  She reports a history of GERD, but states that this sensation has never happened to her before  She denies any personal history of heart problems nor has she ever smoked, however she does state that she has a leaky valve   She does report that her sister has had heart problems in the past       ED Vital Signs:   ED Triage Vitals   Temperature Pulse Respirations Blood Pressure SpO2   10/29/17 2051 10/29/17 2051 10/29/17 2051 10/29/17 2051 10/29/17 2051   98 2 °F (36 8 °C) 70 18 142/67 94 %      Temp Source Heart Rate Source Patient Position - Orthostatic VS BP Location FiO2 (%)   10/29/17 2051 -- 10/29/17 2051 10/29/17 2051 --   Oral  Lying Left arm       Pain Score       10/30/17 0831       No Pain        Wt Readings from Last 1 Encounters:   10/29/17 95 6 kg (210 lb 12 2 oz)     Abnormal Labs/Diagnostic Test Results: Troponin 0 05, NT-pro , D-Dimer 716    WBC Thousand/uL 11 94*   PLATELETS Thousands/uL 415*       Past Medical/Surgical History: Active Ambulatory Problems     Diagnosis Date Noted    No Active Ambulatory Problems     Resolved Ambulatory Problems     Diagnosis Date Noted    No Resolved Ambulatory Problems     Past Medical History:   Diagnosis Date    Cardiac disease     Disease of thyroid gland     Murmur, cardiac        Admitting Diagnosis: Chest pain, unspecified [R07 9]    Age/Sex: 67 y o  female    Assessment/Plan:     Hospital Problem List:      Principal Problem:    Chest pain  Active Problems:    Shortness of breath    GERD (gastroesophageal reflux disease)    Hypothyroid        Plan for the Primary Problem(s):  · Chest Pain   ? Admit to med/surg under observation status with telemetry monitoring   ? POA: Chest pain described as burning and non-radiating, mildly elevated troponin at 0 05, appears atypical in nature, ? GERD exacerbation   ? Trend troponin x 2   ? EKG PRN chest pain   ? Give 325 mg ASA   ? HEART score of 4, SLOAN score of 2 = 8 3% 14 day risk of death   ? Consider cardiology consultation   ? Given SLOAN score is less than or equal to 2 it would be reasonable for the patient should she not clinically decline or have any further derangements in her troponin to have an outpatient chemical stress test in 72 hours and outpatient follow up with PCP or cardiology  ? Would consider heparin gtt with emergent call to cardiology if patient shows signs of acute ACS   · Shortness of Breath  ? Patient appears to have loud nasopharyngeal breath sounds on exam, O2 sats at 94% on RA   ? Albuterol nebs PRN SOB, wheezing      Plan for Additional Problems:   · GERD  ? Continue Protonix 20 mg BID   · Hypothyroid   ?  Levothyroxine 50 mcg QAM      VTE Prophylaxis: Enoxaparin (Lovenox)  / sequential compression device   Code Status: Full code   POLST: POLST form is not discussed and not completed at this time      Anticipated Length of Stay:  Patient will be admitted on an Observation basis with an anticipated length of stay of  Less than 2 midnights     Justification for Hospital Stay: R/O ACS        Admission Orders:  Observation/Tele  Continuous Cardiac Monitoring  Serial Cardiac Enzymes q6h x 3  Bilateral Sequential Compression Device   Nasal O2 @ 2L    Scheduled Meds:   amLODIPine 5 mg Oral Daily   calcium carbonate 1 tablet Oral Daily With Breakfast   enoxaparin 40 mg Subcutaneous Daily   levothyroxine 50 mcg Oral Early Morning   loratadine 10 mg Oral Daily   multivitamin stress formula 1 tablet Oral Daily   pantoprazole 20 mg Oral Early Morning   sucralfate 1,000 mg Oral Q6H [address]

## 2017-10-30 NOTE — H&P
History and Physical - [facility]    Patient Information: Dawson Prater 67 y o  female MRN: [id number]  Unit/Bed#: -01 Encounter: [id number]  Admitting Physician: Danny Vidal PA-C  PCP: Lenin King MD  Date of Admission:  10/29/17    Assessment/Plan:    Hospital Problem List:     Principal Problem:    Chest pain  Active Problems:    Shortness of breath    GERD (gastroesophageal reflux disease)    Hypothyroid      Plan for the Primary Problem(s):  · Chest Pain   · Admit to med/surg under observation status with telemetry monitoring   · POA: Chest pain described as burning and non-radiating, mildly elevated troponin at 0 05, appears atypical in nature, ? GERD exacerbation   · Trend troponin x 2   · EKG PRN chest pain   · Give 325 mg ASA   · HEART score of 4, SLOAN score of 2 = 8 3% 14 day risk of death   · Consider cardiology consultation   · Given SLOAN score is less than or equal to 2 it would be reasonable for the patient should she not clinically decline or have any further derangements in her troponin to have an outpatient chemical stress test in 72 hours and outpatient follow up with PCP or cardiology  · Would consider heparin gtt with emergent call to cardiology if patient shows signs of acute ACS   · Shortness of Breath  · Patient appears to have loud nasopharyngeal breath sounds on exam, O2 sats at 94% on RA   · Albuterol nebs PRN SOB, wheezing     Plan for Additional Problems:   · GERD  · Continue Protonix 20 mg BID   · Hypothyroid   · Levothyroxine 50 mcg QAM     VTE Prophylaxis: Enoxaparin (Lovenox)  / sequential compression device   Code Status: Full code   POLST: POLST form is not discussed and not completed at this time  Anticipated Length of Stay:  Patient will be admitted on an Observation basis with an anticipated length of stay of  Less than 2 midnights     Justification for Hospital Stay: R/O ACS    Total Time for Visit, including Counseling / Coordination of Care: 1 hour   Greater than 50% of this total time spent on direct patient counseling and coordination of care  Chief Complaint:   "I started having chest pain since this morning"    History of Present Illness:    Елена Rendon is a 67 y o  female with no significant medical history who presents with chest pain  She reports that she was going about her daily routine when she started having a burning sensation in her chest that went up her throat that started after she had had breakfast  She states that she had some difficulty breathing with the chest pain  She denied any wheezing or leg swelling  She denies any relieving factors  She did not take anything for the pain, She denies any radiation into the arm or neck  She denies any nausea, vomiting, or coughing  She denies any blurred vision, changes in vision, or dizziness  She reports a history of GERD, but states that this sensation has never happened to her before  She denies any personal history of heart problems nor has she ever smoked, however she does state that she has a leaky valve  She does report that her sister has had heart problems in the past      Review of Systems:    Review of Systems   Constitutional: Negative for appetite change, chills, diaphoresis, fatigue and fever  HENT: Negative for congestion, rhinorrhea and sore throat  Eyes: Negative for visual disturbance  Respiratory: Positive for shortness of breath  Negative for cough, chest tightness and wheezing  Cardiovascular: Positive for chest pain  Negative for palpitations and leg swelling  Gastrointestinal: Negative for abdominal pain, constipation, diarrhea, nausea and vomiting  Genitourinary: Negative for dysuria  Musculoskeletal: Negative for arthralgias and myalgias  Neurological: Negative for dizziness, syncope, weakness, light-headedness, numbness and headaches  All other systems reviewed and are negative        Past Medical and Surgical History:     Past Medical History: Diagnosis Date    Cardiac disease     "leaky valve"    Disease of thyroid gland     Murmur, cardiac        Past Surgical History:   Procedure Laterality Date    CHOLECYSTECTOMY      JOINT REPLACEMENT      b/l knee        Meds/Allergies:    Prior to Admission medications    Medication Sig Start Date End Date Taking? Authorizing Provider   b complex vitamins capsule Take 1 capsule by mouth daily   Yes Historical Provider, MD   calcium carbonate (OS-SYLVESTER) 1250 (500 Ca) MG tablet Take 1 tablet by mouth daily   Yes Historical Provider, MD   omeprazole (PriLOSEC OTC) 20 MG tablet Take 20 mg by mouth daily   Yes Historical Provider, MD   levothyroxine 50 mcg tablet Take 50 mcg by mouth daily    Historical Provider, MD   loratadine (CLARITIN) 10 mg tablet Take 10 mg by mouth daily    Historical Provider, MD     I have reviewed home medications with patient personally  Allergies: Allergies   Allergen Reactions    Latex Rash    Neosporin [Neomycin-Bacitracin Zn-Polymyx] Rash       Social History:     Marital Status: Single   Occupation: Retired  Patient Pre-hospital Living Situation: Home  Patient Pre-hospital Level of Mobility: Full   Patient Pre-hospital Diet Restrictions: None  Substance Use History:   History   Alcohol Use No     History   Smoking Status    Never Smoker   Smokeless Tobacco    Not on file     History   Drug Use No       Family History:    History reviewed  No pertinent family history  Physical Exam:     Vitals:        Physical Exam   Constitutional: She is oriented to person, place, and time  Vital signs are normal  She appears well-developed and well-nourished  Non-toxic appearance  No distress  HENT:   Head: Normocephalic and atraumatic  Mouth/Throat: Mucous membranes are not dry  No oropharyngeal exudate, posterior oropharyngeal edema, posterior oropharyngeal erythema or tonsillar abscesses  Eyes: Conjunctivae and EOM are normal  Pupils are equal, round, and reactive to light  Right pupil is round and reactive  Left pupil is round and reactive  Pupils are equal    Neck: Neck supple  Cardiovascular: Normal rate, regular rhythm, S1 normal and intact distal pulses  Exam reveals no S3 and no S4  Murmur heard  Systolic murmur is present with a grade of 3/6   Pulmonary/Chest: Effort normal and breath sounds normal  No accessory muscle usage or stridor  No respiratory distress  She has no decreased breath sounds  She has no wheezes  She has no rhonchi  She has no rales  She exhibits no tenderness  Breath sounds are coarse at the apices     Abdominal: Soft  Bowel sounds are normal  She exhibits no distension and no mass  There is no tenderness  There is no rigidity, no rebound and no guarding  Neurological: She is alert and oriented to person, place, and time  She has normal strength  She displays no tremor  No cranial nerve deficit or sensory deficit  Skin: Skin is warm and dry  Additional Data:     Lab Results: I have personally reviewed pertinent reports  Results from last 7 days  Lab Units 10/29/17  1102   WBC Thousand/uL 11 94*   HEMOGLOBIN g/dL 12 6   HEMATOCRIT % 40 8   PLATELETS Thousands/uL 415*   NEUTROS PCT % 64   LYMPHS PCT % 23   MONOS PCT % 12   EOS PCT % 1       Results from last 7 days  Lab Units 10/29/17  1102   SODIUM mmol/L 144   POTASSIUM mmol/L 3 6   CHLORIDE mmol/L 107   CO2 mmol/L 28   BUN mg/dL 22   CREATININE mg/dL 0 73   CALCIUM mg/dL 9 9   TOTAL PROTEIN g/dL 7 5   BILIRUBIN TOTAL mg/dL 0 30   ALK PHOS U/L 97   ALT U/L 32   AST U/L 28   GLUCOSE RANDOM mg/dL 124           Imaging: I have personally reviewed pertinent reports  Xr Chest 2 Views    Result Date: 10/29/2017  Narrative: CHEST INDICATION:  Chest pain beginning today  Shortness of breath COMPARISON:  September 5, 2017 VIEWS:  Frontal and lateral projections IMAGES:  2 FINDINGS:  Examination is limited secondary to patient body habitus  Heart mediastinum are stable   The lungs are clear   No pneumothorax or pleural effusion  Visualized osseous structures appear within normal limits for the patient's age  Impression: No active pulmonary disease  Workstation performed: [id number]     [facility] Chest Pe Study    Result Date: 10/29/2017  Narrative: CTA - CHEST WITH IV CONTRAST - PULMONARY ANGIOGRAM INDICATION: Chest pain  Shortness of breath  COMPARISON: None  TECHNIQUE: CTA examination of the chest was performed using angiographic technique according to a protocol specifically tailored to evaluate for pulmonary embolism  Reformatted images were created in axial, sagittal, and coronal planes  In addition, coronal 3D MIP postprocessing was performed on the acquisition scanner  Radiation dose length product (DLP) for this visit:  516 mGy-cm   This examination, like all CT scans performed in the [facility], was performed utilizing techniques to minimize radiation dose exposure, including the use of iterative reconstruction and automated exposure control  IV Contrast:  85 mL of iohexol (OMNIPAQUE)      FINDINGS: PULMONARY ARTERIAL TREE:  No pulmonary embolus is seen  LUNGS:  There is bibasilar dependent atelectasis  There are scattered calcified granulomata  No endobronchial lesion  PLEURA:  Unremarkable  HEART/AORTA:  Unremarkable for patient's age  MEDIASTINUM AND SCOTTY:  Unremarkable  CHEST WALL AND LOWER NECK:       There is diffuse enlargement of the thyroid gland without evident focal nodule  VISUALIZED STRUCTURES IN THE UPPER ABDOMEN:  Unremarkable  OSSEOUS STRUCTURES:  No acute fracture or destructive osseous lesion  Impression: No pulmonary embolus  Workstation performed: [id number]       EKG, Pathology, and Other Studies Reviewed on Admission:   · EKG:     Allscripts Records Reviewed: Yes     ** Please Note: Dragon 360 Dictation voice to text software may have been used in the creation of this document   **

## 2017-10-30 NOTE — RESPIRATORY THERAPY NOTE
RT Protocol Note  Dawson Prater 67 y o  female MRN: [id number]  Unit/Bed#: -01 Encounter: [id number]    Assessment     Principal Problem:    Chest pain  Active Problems:    Shortness of breath    GERD (gastroesophageal reflux disease)    Hypothyroid      Home Pulmonary Medications:  None per pt  Past Medical History:   Diagnosis Date    Cardiac disease     "leaky valve"    Disease of thyroid gland     Murmur, cardiac      Social History     Social History    Marital status: Single     Spouse name: N/A    Number of children: N/A    Years of education: N/A     Social History Main Topics    Smoking status: Never Smoker    Smokeless tobacco: None    Alcohol use No    Drug use: No    Sexual activity: Not Asked     Other Topics Concern    None     Social History Narrative    None       Subjective          Objective     Physical Exam:   Assessment Type: Assess only  General Appearance: Alert, Awake  Respiratory Pattern: Dyspnea with exertion  Chest Assessment: Chest expansion symmetrical  Bilateral Breath Sounds: Diminished  Cough: None    Vitals:  Blood pressure 153/68, pulse 68, temperature 98 1 °F (36 7 °C), temperature source Oral, resp  rate 19, height 4' 7" (1 397 m), weight 95 6 kg (210 lb 12 2 oz), SpO2 99 %  Imaging and other studies: I have personally reviewed pertinent reports              Plan     Respiratory Plan: Discontinue Protocol (Currently has acceptable respiratory orders)        Resp Comments: Pt  wanted neb

## 2017-10-30 NOTE — DISCHARGE SUMMARY
Discharge Summary - Ricarda Severs 67 y o  female MRN: [id number]    Unit/Bed#: -01 Encounter: [id number]    Admission Date: 10/29/2017     Admitting Diagnosis: Chest pain, unspecified [R07 9]    HPI:  Ricarda Severs is a 67 y o  female with no significant medical history who presents with chest pain  She reports that she was going about her daily routine when she started having a burning sensation in her chest that went up her throat that started after she had had breakfast  She states that she had some difficulty breathing with the chest pain  She denied any wheezing or leg swelling  She denies any relieving factors  She did not take anything for the pain, She denies any radiation into the arm or neck  She denies any nausea, vomiting, or coughing  She denies any blurred vision, changes in vision, or dizziness  She reports a history of GERD, but states that this sensation has never happened to her before  She denies any personal history of heart problems nor has she ever smoked, however she does state that she has a leaky valve  She does report that her sister has had heart problems in the past      Procedures Performed: No orders of the defined types were placed in this encounter  Hospital Course:  Overnight patient has improvement of symptoms  She did not have shortness of breath, no chest pain and was able to eat, ambulate with out dyspnea  She has heart score of 4 and SLOAN score of 2, has 8 3% 14 day risk of death  She would need outpatient stress test  She is scheduled for tomorrow, within 72 hrs and patient agrees with plan  She was also given cardiology phone number to follow up  She has history of smoking, she will need PFTs outpatient for evaluation for COPD vs  Asthma  She improved with nebulization  She was given nebulizer machine and equipment  CM will discuss and set it up for patient  She was given pulmonary doctor contact number   There was no EKG changes and troponins were trended down, so no heparin was placed  She was placed on aspirin and statin  Significant Findings, Care, Treatment and Services Provided: as above    Complications: none    Discharge Diagnosis: ACS ruled out    Condition at Discharge: stable     Discharge instructions/Information to patient and family:   See after visit summary for information provided to patient and family  Provisions for Follow-Up Care:  See after visit summary for information related to follow-up care and any pertinent home health orders  Disposition: Home    Planned Readmission: No    Discharge Statement   I spent 35 minutes discharging the patient  This time was spent on the day of discharge  I had direct contact with the patient on the day of discharge  Additional documentation is required if more than 30 minutes were spent on discharge  Discharge Medications:  See after visit summary for reconciled discharge medications provided to patient and family

## 2017-10-31 RX ORDER — ASPIRIN 81 MG/1
81 TABLET ORAL DAILY
Qty: 30 TABLET | Refills: 0 | Status: ON HOLD | OUTPATIENT
Start: 2017-10-31 | End: 2018-10-11

## 2017-10-31 RX ORDER — ATORVASTATIN CALCIUM 10 MG/1
10 TABLET, FILM COATED ORAL DAILY
Qty: 30 TABLET | Refills: 0 | Status: SHIPPED | OUTPATIENT
Start: 2017-10-31 | End: 2017-12-02

## 2017-10-31 NOTE — SOCIAL WORK
CM received scripts for a nebulizer machine  Referral and scripts have been sent to [facility] to deliver machine in the morning  Arcadio Level is aware  CM will follow up

## 2017-10-31 NOTE — SOCIAL WORK
Per Medicare, pt does not qualify for nebulizer machine as she does not have the appropriate diagnosis  Pt was requesting the machine  [employer] is planning to contact pt and discuss this with her

## 2017-11-01 ENCOUNTER — HOSPITAL ENCOUNTER (OUTPATIENT)
Dept: NON INVASIVE DIAGNOSTICS | Facility: CLINIC | Age: 77
Discharge: HOME | End: 2017-11-01
Payer: MEDICARE

## 2017-11-01 DIAGNOSIS — R07.9 CHEST PAIN: ICD-10-CM

## 2017-11-01 DIAGNOSIS — I51.7 CARDIOMEGALY: ICD-10-CM

## 2017-11-01 DIAGNOSIS — I35.0 NONRHEUMATIC AORTIC VALVE STENOSIS: ICD-10-CM

## 2017-11-01 DIAGNOSIS — I35.1 NONRHEUMATIC AORTIC VALVE INSUFFICIENCY: ICD-10-CM

## 2017-11-01 PROCEDURE — A9502 TC99M TETROFOSMIN: HCPCS

## 2017-11-01 PROCEDURE — 78452 HT MUSCLE IMAGE SPECT MULT: CPT

## 2017-11-01 PROCEDURE — 93017 CV STRESS TEST TRACING ONLY: CPT

## 2017-11-01 RX ORDER — AMINOPHYLLINE DIHYDRATE 25 MG/ML
50 INJECTION, SOLUTION INTRAVENOUS ONCE
Status: COMPLETED | OUTPATIENT
Start: 2017-11-01 | End: 2017-11-01

## 2017-11-01 RX ADMIN — REGADENOSON 0.4 MG: 0.08 INJECTION, SOLUTION INTRAVENOUS at 13:57

## 2017-11-01 RX ADMIN — AMINOPHYLLINE 50 MG: 25 INJECTION, SOLUTION INTRAVENOUS at 14:01

## 2017-11-03 ENCOUNTER — GENERIC CONVERSION - ENCOUNTER (OUTPATIENT)
Dept: OTHER | Facility: OTHER | Age: 77
End: 2017-11-03

## 2017-11-03 LAB
ARRHY DURING EX: NORMAL
CHEST PAIN STATEMENT: NORMAL
MAX DIASTOLIC BP: 96 MMHG
MAX HEART RATE: 92 BPM
MAX PREDICTED HEART RATE: 143 BPM
MAX. SYSTOLIC BP: 156 MMHG
PROTOCOL NAME: NORMAL
REASON FOR TERMINATION: NORMAL
TARGET HR FORMULA: NORMAL
TEST INDICATION: NORMAL
TIME IN EXERCISE PHASE: 180 S

## 2017-12-02 ENCOUNTER — HOSPITAL ENCOUNTER (INPATIENT)
Facility: HOSPITAL | Age: 77
LOS: 4 days | Discharge: HOME | DRG: 178 | End: 2017-12-07
Attending: EMERGENCY MEDICINE | Admitting: INTERNAL MEDICINE
Payer: MEDICARE

## 2017-12-02 ENCOUNTER — APPOINTMENT (EMERGENCY)
Dept: RADIOLOGY | Facility: HOSPITAL | Age: 77
DRG: 178 | End: 2017-12-02
Payer: MEDICARE

## 2017-12-02 DIAGNOSIS — J18.9 COMMUNITY ACQUIRED PNEUMONIA OF LEFT LOWER LOBE OF LUNG: ICD-10-CM

## 2017-12-02 DIAGNOSIS — J40 BRONCHITIS: Primary | ICD-10-CM

## 2017-12-02 DIAGNOSIS — R09.02 HYPOXIA: ICD-10-CM

## 2017-12-02 DIAGNOSIS — R06.2 WHEEZING: ICD-10-CM

## 2017-12-02 PROBLEM — Z99.81 OXYGEN DEPENDENT: Status: ACTIVE | Noted: 2017-12-02

## 2017-12-02 PROBLEM — J20.9 ACUTE BRONCHITIS: Status: ACTIVE | Noted: 2017-12-02

## 2017-12-02 LAB
ALBUMIN SERPL BCP-MCNC: 3.3 G/DL (ref 3.5–5)
ALP SERPL-CCNC: 76 U/L (ref 46–116)
ALT SERPL W P-5'-P-CCNC: 22 U/L (ref 12–78)
ANION GAP SERPL CALCULATED.3IONS-SCNC: 6 MMOL/L (ref 4–13)
APTT PPP: 31 SECONDS (ref 23–35)
AST SERPL W P-5'-P-CCNC: 23 U/L (ref 5–45)
BASOPHILS # BLD AUTO: 0.05 THOUSANDS/ΜL (ref 0–0.1)
BASOPHILS NFR BLD AUTO: 1 % (ref 0–1)
BILIRUB SERPL-MCNC: 0.4 MG/DL (ref 0.2–1)
BUN SERPL-MCNC: 12 MG/DL (ref 5–25)
CALCIUM SERPL-MCNC: 9.5 MG/DL (ref 8.3–10.1)
CHLORIDE SERPL-SCNC: 107 MMOL/L (ref 100–108)
CO2 SERPL-SCNC: 30 MMOL/L (ref 21–32)
CREAT SERPL-MCNC: 0.8 MG/DL (ref 0.6–1.3)
EOSINOPHIL # BLD AUTO: 0.69 THOUSAND/ΜL (ref 0–0.61)
EOSINOPHIL NFR BLD AUTO: 8 % (ref 0–6)
ERYTHROCYTE [DISTWIDTH] IN BLOOD BY AUTOMATED COUNT: 16.4 % (ref 11.6–15.1)
FLUAV AG SPEC QL IA: NEGATIVE
FLUBV AG SPEC QL IA: NEGATIVE
GFR SERPL CREATININE-BSD FRML MDRD: 71 ML/MIN/1.73SQ M
GLUCOSE SERPL-MCNC: 143 MG/DL (ref 65–140)
HCT VFR BLD AUTO: 37.7 % (ref 34.8–46.1)
HGB BLD-MCNC: 11.7 G/DL (ref 11.5–15.4)
INR PPP: 0.98 (ref 0.86–1.16)
LYMPHOCYTES # BLD AUTO: 1.37 THOUSANDS/ΜL (ref 0.6–4.47)
LYMPHOCYTES NFR BLD AUTO: 15 % (ref 14–44)
MCH RBC QN AUTO: 25.3 PG (ref 26.8–34.3)
MCHC RBC AUTO-ENTMCNC: 31 G/DL (ref 31.4–37.4)
MCV RBC AUTO: 82 FL (ref 82–98)
MONOCYTES # BLD AUTO: 0.75 THOUSAND/ΜL (ref 0.17–1.22)
MONOCYTES NFR BLD AUTO: 8 % (ref 4–12)
NEUTROPHILS # BLD AUTO: 6.09 THOUSANDS/ΜL (ref 1.85–7.62)
NEUTS SEG NFR BLD AUTO: 68 % (ref 43–75)
NRBC BLD AUTO-RTO: 0 /100 WBCS
NT-PROBNP SERPL-MCNC: 209 PG/ML
PLATELET # BLD AUTO: 337 THOUSANDS/UL (ref 149–390)
PMV BLD AUTO: 9.4 FL (ref 8.9–12.7)
POTASSIUM SERPL-SCNC: 3.7 MMOL/L (ref 3.5–5.3)
PROT SERPL-MCNC: 7.2 G/DL (ref 6.4–8.2)
PROTHROMBIN TIME: 13.2 SECONDS (ref 12.1–14.4)
RBC # BLD AUTO: 4.62 MILLION/UL (ref 3.81–5.12)
SODIUM SERPL-SCNC: 143 MMOL/L (ref 136–145)
TROPONIN I SERPL-MCNC: <0.02 NG/ML
TROPONIN I SERPL-MCNC: <0.02 NG/ML
WBC # BLD AUTO: 8.99 THOUSAND/UL (ref 4.31–10.16)

## 2017-12-02 PROCEDURE — 87798 DETECT AGENT NOS DNA AMP: CPT | Performed by: EMERGENCY MEDICINE

## 2017-12-02 PROCEDURE — 85730 THROMBOPLASTIN TIME PARTIAL: CPT | Performed by: EMERGENCY MEDICINE

## 2017-12-02 PROCEDURE — 80053 COMPREHEN METABOLIC PANEL: CPT | Performed by: EMERGENCY MEDICINE

## 2017-12-02 PROCEDURE — 99285 EMERGENCY DEPT VISIT HI MDM: CPT

## 2017-12-02 PROCEDURE — 94760 N-INVAS EAR/PLS OXIMETRY 1: CPT

## 2017-12-02 PROCEDURE — 85610 PROTHROMBIN TIME: CPT | Performed by: EMERGENCY MEDICINE

## 2017-12-02 PROCEDURE — 87400 INFLUENZA A/B EACH AG IA: CPT | Performed by: EMERGENCY MEDICINE

## 2017-12-02 PROCEDURE — 96374 THER/PROPH/DIAG INJ IV PUSH: CPT

## 2017-12-02 PROCEDURE — 85025 COMPLETE CBC W/AUTO DIFF WBC: CPT | Performed by: EMERGENCY MEDICINE

## 2017-12-02 PROCEDURE — 71010 HB CHEST X-RAY 1 VIEW FRONTAL (PORTABLE): CPT

## 2017-12-02 PROCEDURE — 94640 AIRWAY INHALATION TREATMENT: CPT

## 2017-12-02 PROCEDURE — 36415 COLL VENOUS BLD VENIPUNCTURE: CPT | Performed by: EMERGENCY MEDICINE

## 2017-12-02 PROCEDURE — 94762 N-INVAS EAR/PLS OXIMTRY CONT: CPT

## 2017-12-02 PROCEDURE — 93005 ELECTROCARDIOGRAM TRACING: CPT | Performed by: EMERGENCY MEDICINE

## 2017-12-02 PROCEDURE — 84484 ASSAY OF TROPONIN QUANT: CPT | Performed by: EMERGENCY MEDICINE

## 2017-12-02 PROCEDURE — 96375 TX/PRO/DX INJ NEW DRUG ADDON: CPT

## 2017-12-02 PROCEDURE — 83880 ASSAY OF NATRIURETIC PEPTIDE: CPT | Performed by: EMERGENCY MEDICINE

## 2017-12-02 RX ORDER — ALBUTEROL SULFATE 2.5 MG/3ML
2.5 SOLUTION RESPIRATORY (INHALATION) ONCE
Status: COMPLETED | OUTPATIENT
Start: 2017-12-02 | End: 2017-12-02

## 2017-12-02 RX ORDER — PRAVASTATIN SODIUM 40 MG
40 TABLET ORAL
Status: DISCONTINUED | OUTPATIENT
Start: 2017-12-02 | End: 2017-12-07 | Stop reason: HOSPADM

## 2017-12-02 RX ORDER — TEMAZEPAM 15 MG/1
15 CAPSULE ORAL
Status: DISCONTINUED | OUTPATIENT
Start: 2017-12-02 | End: 2017-12-07 | Stop reason: HOSPADM

## 2017-12-02 RX ORDER — CALCIUM CARBONATE 500(1250)
1 TABLET ORAL 2 TIMES DAILY WITH MEALS
Status: DISCONTINUED | OUTPATIENT
Start: 2017-12-02 | End: 2017-12-07 | Stop reason: HOSPADM

## 2017-12-02 RX ORDER — HEPARIN SODIUM 5000 [USP'U]/ML
5000 INJECTION, SOLUTION INTRAVENOUS; SUBCUTANEOUS EVERY 8 HOURS SCHEDULED
Status: DISCONTINUED | OUTPATIENT
Start: 2017-12-02 | End: 2017-12-07 | Stop reason: HOSPADM

## 2017-12-02 RX ORDER — METHYLPREDNISOLONE SODIUM SUCCINATE 40 MG/ML
40 INJECTION, POWDER, LYOPHILIZED, FOR SOLUTION INTRAMUSCULAR; INTRAVENOUS EVERY 12 HOURS SCHEDULED
Status: COMPLETED | OUTPATIENT
Start: 2017-12-02 | End: 2017-12-05

## 2017-12-02 RX ORDER — AMLODIPINE BESYLATE 5 MG/1
5 TABLET ORAL DAILY
Status: DISCONTINUED | OUTPATIENT
Start: 2017-12-03 | End: 2017-12-06

## 2017-12-02 RX ORDER — SIMVASTATIN 40 MG
40 TABLET ORAL
Status: ON HOLD | COMMUNITY
End: 2018-10-11

## 2017-12-02 RX ORDER — PANTOPRAZOLE SODIUM 40 MG/1
40 TABLET, DELAYED RELEASE ORAL
Status: DISCONTINUED | OUTPATIENT
Start: 2017-12-02 | End: 2017-12-07 | Stop reason: HOSPADM

## 2017-12-02 RX ORDER — FESOTERODINE FUMARATE 8 MG/1
8 TABLET, FILM COATED, EXTENDED RELEASE ORAL DAILY
COMMUNITY

## 2017-12-02 RX ORDER — TEMAZEPAM 7.5 MG/1
15 CAPSULE ORAL
COMMUNITY
End: 2018-07-17

## 2017-12-02 RX ORDER — METHYLPREDNISOLONE SODIUM SUCCINATE 125 MG/2ML
125 INJECTION, POWDER, LYOPHILIZED, FOR SOLUTION INTRAMUSCULAR; INTRAVENOUS ONCE
Status: COMPLETED | OUTPATIENT
Start: 2017-12-02 | End: 2017-12-02

## 2017-12-02 RX ORDER — VITAMIN B COMPLEX
1 CAPSULE ORAL DAILY
Status: DISCONTINUED | OUTPATIENT
Start: 2017-12-03 | End: 2017-12-07 | Stop reason: HOSPADM

## 2017-12-02 RX ORDER — ACETAMINOPHEN 325 MG/1
650 TABLET ORAL EVERY 6 HOURS PRN
Status: DISCONTINUED | OUTPATIENT
Start: 2017-12-02 | End: 2017-12-07 | Stop reason: HOSPADM

## 2017-12-02 RX ORDER — IPRATROPIUM BROMIDE AND ALBUTEROL SULFATE 2.5; .5 MG/3ML; MG/3ML
3 SOLUTION RESPIRATORY (INHALATION) EVERY 4 HOURS PRN
Status: DISCONTINUED | OUTPATIENT
Start: 2017-12-02 | End: 2017-12-06

## 2017-12-02 RX ORDER — LEVOTHYROXINE SODIUM 0.05 MG/1
50 TABLET ORAL DAILY
Status: DISCONTINUED | OUTPATIENT
Start: 2017-12-03 | End: 2017-12-07 | Stop reason: HOSPADM

## 2017-12-02 RX ORDER — ONDANSETRON 2 MG/ML
4 INJECTION INTRAMUSCULAR; INTRAVENOUS ONCE
Status: COMPLETED | OUTPATIENT
Start: 2017-12-02 | End: 2017-12-02

## 2017-12-02 RX ORDER — BENZONATATE 100 MG/1
100 CAPSULE ORAL 3 TIMES DAILY PRN
Qty: 21 CAPSULE | Refills: 0 | Status: SHIPPED | OUTPATIENT
Start: 2017-12-02 | End: 2017-12-07

## 2017-12-02 RX ORDER — ONDANSETRON 2 MG/ML
4 INJECTION INTRAMUSCULAR; INTRAVENOUS EVERY 6 HOURS PRN
Status: DISCONTINUED | OUTPATIENT
Start: 2017-12-02 | End: 2017-12-07 | Stop reason: HOSPADM

## 2017-12-02 RX ORDER — LORATADINE 10 MG/1
10 TABLET ORAL DAILY
Status: DISCONTINUED | OUTPATIENT
Start: 2017-12-03 | End: 2017-12-07 | Stop reason: HOSPADM

## 2017-12-02 RX ORDER — BLACK COHOSH ROOT EXTRACT 80 MG
1 CAPSULE ORAL 2 TIMES DAILY
COMMUNITY
End: 2017-12-02 | Stop reason: CLARIF

## 2017-12-02 RX ORDER — PREDNISONE 20 MG/1
40 TABLET ORAL DAILY
Qty: 8 TABLET | Refills: 0 | Status: SHIPPED | OUTPATIENT
Start: 2017-12-03 | End: 2017-12-07

## 2017-12-02 RX ORDER — MULTIVITAMIN
1 TABLET ORAL 2 TIMES DAILY
Status: ON HOLD | COMMUNITY
End: 2018-08-14

## 2017-12-02 RX ORDER — ASPIRIN 81 MG/1
81 TABLET ORAL DAILY
Status: DISCONTINUED | OUTPATIENT
Start: 2017-12-03 | End: 2017-12-07 | Stop reason: HOSPADM

## 2017-12-02 RX ADMIN — ALBUTEROL SULFATE 2.5 MG: 2.5 SOLUTION RESPIRATORY (INHALATION) at 11:09

## 2017-12-02 RX ADMIN — ALBUTEROL SULFATE 2.5 MG: 2.5 SOLUTION RESPIRATORY (INHALATION) at 12:11

## 2017-12-02 RX ADMIN — IPRATROPIUM BROMIDE AND ALBUTEROL SULFATE 3 ML: .5; 3 SOLUTION RESPIRATORY (INHALATION) at 22:18

## 2017-12-02 RX ADMIN — IPRATROPIUM BROMIDE 0.5 MG: 0.5 SOLUTION RESPIRATORY (INHALATION) at 12:11

## 2017-12-02 RX ADMIN — TEMAZEPAM 15 MG: 15 CAPSULE ORAL at 21:51

## 2017-12-02 RX ADMIN — METHYLPREDNISOLONE SODIUM SUCCINATE 40 MG: 40 INJECTION, POWDER, FOR SOLUTION INTRAMUSCULAR; INTRAVENOUS at 21:52

## 2017-12-02 RX ADMIN — ONDANSETRON 4 MG: 2 INJECTION INTRAMUSCULAR; INTRAVENOUS at 11:32

## 2017-12-02 RX ADMIN — METHYLPREDNISOLONE SODIUM SUCCINATE 125 MG: 125 INJECTION, POWDER, FOR SOLUTION INTRAMUSCULAR; INTRAVENOUS at 11:10

## 2017-12-02 RX ADMIN — HEPARIN SODIUM 5000 UNITS: 5000 INJECTION, SOLUTION INTRAVENOUS; SUBCUTANEOUS at 21:52

## 2017-12-02 RX ADMIN — PRAVASTATIN SODIUM 40 MG: 40 TABLET ORAL at 17:34

## 2017-12-02 RX ADMIN — ACETAMINOPHEN 650 MG: 325 TABLET ORAL at 21:51

## 2017-12-02 RX ADMIN — IPRATROPIUM BROMIDE 0.5 MG: 0.5 SOLUTION RESPIRATORY (INHALATION) at 11:09

## 2017-12-02 RX ADMIN — HEPARIN SODIUM 5000 UNITS: 5000 INJECTION, SOLUTION INTRAVENOUS; SUBCUTANEOUS at 17:38

## 2017-12-02 NOTE — ED PROVIDER NOTES
History  Chief Complaint   Patient presents with    Shortness of Breath     Pt was seen at family doctor and was dx with bronchitis  Pt has been taking abx with no relief  Denies hx of asthma, COPD  Pt not a smoker  67 y o  female presenting with shortness of breath and cough  The patient states that last night she began to have dyspnea, wheezing, and non-productive cough; she has been using her home nebulizer with minimal relief of symptoms  She was diagnosed with bronchitis 1 week ago by her PMD and finished a Z pack  Denies fever, chest pain, nausea or vomiting, diarrhea, dysuria, orthopnea, or palpitations  No recent travel/ immobilization/ surgery, no history of VTE  History provided by:  Patient and medical records  Shortness of Breath   Associated symptoms: cough    Associated symptoms: no abdominal pain, no chest pain and no fever        Prior to Admission Medications   Prescriptions Last Dose Informant Patient Reported? Taking?    Fesoterodine Fumarate ER (TOVIAZ) 8 MG TB24   Yes Yes   Sig: Take 8 mg by mouth daily   Glucosamine Sulfate-MSM (MSM/GLUCOSAMINE) 250-250 MG CAPS   Yes Yes   Sig: Take 1 tablet by mouth 2 (two) times a day   Multiple Vitamin (MULTIVITAMIN) tablet   Yes Yes   Sig: Take 1 tablet by mouth 2 (two) times a day   albuterol (2 5 mg/3 mL) 0 083 % nebulizer solution 12/2/2017 at Unknown time  No Yes   Sig: Take 3 mL by nebulization every 6 (six) hours as needed for wheezing or shortness of breath   amLODIPine (NORVASC) 5 mg tablet   No Yes   Sig: Take 1 tablet by mouth daily   aspirin (ECOTRIN LOW STRENGTH) 81 mg EC tablet   No Yes   Sig: Take 1 tablet by mouth daily   b complex vitamins capsule   Yes Yes   Sig: Take 1 capsule by mouth daily   calcium carbonate (OS-SYLVESTER) 1250 (500 Ca) MG tablet   Yes Yes   Sig: Take 1 tablet by mouth daily   levothyroxine 50 mcg tablet   Yes No   Sig: Take 50 mcg by mouth daily   loratadine (CLARITIN) 10 mg tablet   Yes Yes   Sig: Take 10 mg by mouth daily   omeprazole (PriLOSEC OTC) 20 MG tablet   Yes Yes   Sig: Take 20 mg by mouth daily   simvastatin (ZOCOR) 40 mg tablet   Yes Yes   Sig: Take 40 mg by mouth daily at bedtime   sucralfate (CARAFATE) 1 g/10 mL suspension   No No   Sig: Take 10 mL by mouth every 6 (six) hours   temazepam (RESTORIL) 7 5 mg capsule   Yes Yes   Sig: Take 15 mg by mouth daily at bedtime as needed for sleep      Facility-Administered Medications: None       Past Medical History:   Diagnosis Date    Cardiac disease     "leaky valve"    Disease of thyroid gland     Murmur, cardiac        Past Surgical History:   Procedure Laterality Date    CARPAL TUNNEL RELEASE      CHOLECYSTECTOMY      JOINT REPLACEMENT      b/l knee        History reviewed  No pertinent family history  I have reviewed and agree with the history as documented  Social History   Substance Use Topics    Smoking status: Never Smoker    Smokeless tobacco: Never Used    Alcohol use No        Review of Systems   Constitutional: Negative for fever  HENT: Positive for congestion  Eyes: Negative for visual disturbance  Respiratory: Positive for cough and shortness of breath  Cardiovascular: Negative for chest pain  Gastrointestinal: Negative for abdominal pain  Genitourinary: Negative for dysuria  Musculoskeletal: Negative for back pain  Neurological: Negative for numbness  Psychiatric/Behavioral: Negative for confusion  All other systems reviewed and are negative        Physical Exam  ED Triage Vitals   Temperature Pulse Respirations Blood Pressure SpO2   12/02/17 1040 12/02/17 1039 12/02/17 1039 12/02/17 1039 12/02/17 1039   97 6 °F (36 4 °C) 102 22 (!) 180/83 (!) 88 %      Temp Source Heart Rate Source Patient Position - Orthostatic VS BP Location FiO2 (%)   12/02/17 1039 12/02/17 1039 12/02/17 1039 12/02/17 1039 --   Oral Monitor Lying Left arm       Pain Score       --                  Orthostatic Vital Signs  Vitals:    12/02/17 1415 12/02/17 1430 12/02/17 1445 12/02/17 1500   BP:  136/63  152/65   Pulse: 81 80 76 81   Patient Position - Orthostatic VS:           Physical Exam   Constitutional: She is oriented to person, place, and time  She appears well-developed and well-nourished  No distress  HENT:   Head: Normocephalic and atraumatic  Eyes: Pupils are equal, round, and reactive to light  Neck: Normal range of motion  Cardiovascular: Normal rate and regular rhythm  Murmur heard  Pulmonary/Chest:   Dyspneic, increased work of breathing, speaking in 5-6 word sentences  Diminished breath sounds bilaterally with faint end-expiratory wheeze   Abdominal: Soft  There is no tenderness  There is no rebound and no guarding  Musculoskeletal: Normal range of motion  She exhibits edema  2+ pitting edema to knees bilaterally  No calf tenderness   Neurological: She is alert and oriented to person, place, and time  No cranial nerve deficit  Skin: Skin is warm and dry  Psychiatric: Her behavior is normal    Nursing note and vitals reviewed        ED Medications  Medications   ipratropium (ATROVENT) 0 02 % inhalation solution 0 5 mg (0 5 mg Nebulization Given 12/2/17 1109)   albuterol inhalation solution 2 5 mg (2 5 mg Nebulization Given 12/2/17 1109)   methylPREDNISolone sodium succinate (Solu-MEDROL) injection 125 mg (125 mg Intravenous Given 12/2/17 1110)   ondansetron (ZOFRAN) injection 4 mg (4 mg Intravenous Given 12/2/17 1132)   albuterol inhalation solution 2 5 mg (2 5 mg Nebulization Given 12/2/17 1211)   ipratropium (ATROVENT) 0 02 % inhalation solution 0 5 mg (0 5 mg Nebulization Given 12/2/17 1211)       Diagnostic Studies  Results Reviewed     Procedure Component Value Units Date/Time    Troponin I [[id number]]  (Normal) Collected:  12/02/17 1424    Lab Status:  Final result Specimen:  Blood from Arm, Right Updated:  12/02/17 1447     Troponin I <0 02 ng/mL     Narrative:         [employer] Chemistry analyzer 99% cutoff is > 0 04 ng/mL in network labs    o cTnI 99% cutoff is useful only when applied to patients in the clinical setting of myocardial ischemia  o cTnI 99% cutoff should be interpreted in the context of clinical history, ECG findings and possibly cardiac imaging to establish correct diagnosis  o cTnI 99% cutoff may be suggestive but clearly not indicative of a coronary event without the clinical setting of myocardial ischemia  BNP [[id number]]  (Normal) Collected:  12/02/17 1117    Lab Status:  Final result Specimen:  Blood from Arm, Right Updated:  12/02/17 1152     NT-proBNP 209 pg/mL     Troponin I [[id number]]  (Normal) Collected:  12/02/17 1117    Lab Status:  Final result Specimen:  Blood from Arm, Right Updated:  12/02/17 1150     Troponin I <0 02 ng/mL     Narrative:         [employer] Chemistry analyzer 99% cutoff is > 0 04 ng/mL in network labs    o cTnI 99% cutoff is useful only when applied to patients in the clinical setting of myocardial ischemia  o cTnI 99% cutoff should be interpreted in the context of clinical history, ECG findings and possibly cardiac imaging to establish correct diagnosis  o cTnI 99% cutoff may be suggestive but clearly not indicative of a coronary event without the clinical setting of myocardial ischemia      Comprehensive metabolic panel [[id number]]  (Abnormal) Collected:  12/02/17 1117    Lab Status:  Final result Specimen:  Blood from Arm, Right Updated:  12/02/17 1148     Sodium 143 mmol/L      Potassium 3 7 mmol/L      Chloride 107 mmol/L      CO2 30 mmol/L      Anion Gap 6 mmol/L      BUN 12 mg/dL      Creatinine 0 80 mg/dL      Glucose 143 (H) mg/dL      Calcium 9 5 mg/dL      AST 23 U/L      ALT 22 U/L      Alkaline Phosphatase 76 U/L      Total Protein 7 2 g/dL      Albumin 3 3 (L) g/dL      Total Bilirubin 0 40 mg/dL      eGFR 71 ml/min/1 73sq m     Narrative:         National Kidney Disease Education Program recommendations are as follows:  GFR calculation is accurate only with a steady state creatinine  Chronic Kidney disease less than 60 ml/min/1 73 sq  meters  Kidney failure less than 15 ml/min/1 73 sq  meters  Rapid Influenza Screen with Reflex PCR (Indicated for patients <2 mo of age) [[id number]]  (Normal) Collected:  12/02/17 1117    Lab Status:  Final result Specimen:  Nasopharyngeal from Nasopharyngeal Swab Updated:  12/02/17 1143     Rapid Influenza A Ag Negative     Rapid Influenza B Ag Negative    Influenza A/B and RSV by PCR (Indicated for patients > 2 mo of age) [[id number]] Collected:  12/02/17 1117    Lab Status: In process Specimen:  Nasopharyngeal from Nasopharyngeal Swab Updated:  12/02/17 1142    APTT [[id number]]  (Normal) Collected:  12/02/17 1117    Lab Status:  Final result Specimen:  Blood from Arm, Right Updated:  12/02/17 1142     PTT 31 seconds     Narrative:          Therapeutic Heparin Range = 60-90 seconds    Protime-INR [[id number]]  (Normal) Collected:  12/02/17 1117    Lab Status:  Final result Specimen:  Blood from Arm, Right Updated:  12/02/17 1142     Protime 13 2 seconds      INR 0 98    CBC and differential [[id number]]  (Abnormal) Collected:  12/02/17 1117    Lab Status:  Final result Specimen:  Blood from Arm, Right Updated:  12/02/17 1129     WBC 8 99 Thousand/uL      RBC 4 62 Million/uL      Hemoglobin 11 7 g/dL      Hematocrit 37 7 %      MCV 82 fL      MCH 25 3 (L) pg      MCHC 31 0 (L) g/dL      RDW 16 4 (H) %      MPV 9 4 fL      Platelets 078 Thousands/uL      nRBC 0 /100 WBCs      Neutrophils Relative 68 %      Lymphocytes Relative 15 %      Monocytes Relative 8 %      Eosinophils Relative 8 (H) %      Basophils Relative 1 %      Neutrophils Absolute 6 09 Thousands/µL      Lymphocytes Absolute 1 37 Thousands/µL      Monocytes Absolute 0 75 Thousand/µL      Eosinophils Absolute 0 69 (H) Thousand/µL      Basophils Absolute 0 05 Thousands/µL                  XR chest 1 view portable   Final Result by Scott Mitchell MD (12/02 1130)      No active pulmonary disease on examination which is somewhat limited secondary to low lung volumes  Workstation performed: [id number]                    Procedures  ECG 12 Lead Documentation  Date/Time: 12/2/2017 10:47 AM  Performed by: Mario Claudio by: Kavon Encarnacion     Indications / Diagnosis:  Shortness of breath  ECG reviewed by me, the ED Provider: yes    Patient location:  ED  Comments:      Rate: 88 BPM  Rhythm: Sinus  Axis: Normal  Intervals: Borderline 1st degree AV block, QTc 460 ms  Q waves: None  T waves: Flattened in aVL  ST segments: No elevations or depressions    Impression: Infrequent PVCs, no significant changes as compared to EKG from 10/29/2017  Phone Contacts  ED Phone Contact    ED Course  ED Course as of Dec 02 1516   Sat Dec 02, 2017   1154 Patient re-evaluated, now with improved air entry but continued full-field bilateral wheeze on exam  Will give second duoneb     1321 Patient no longer wheezing, much improved air entry, no longer appears dyspneic  Will keep for delta troponin before determining disposition         Assessment:   67 y o  female presenting with shortness of breath, wheezing, cough    Differential diagnosis includes, but is not limited to: pneumonia/ bronchitis, pleural effusion, CHF, COPD exacerbation, ACS    Plan:  EKG and monitor  Nebs and steroids  CXR  Labs   Reassess      HEART Risk Score    Flowsheet Row Most Recent Value   History  0 Filed at: 12/02/2017 1224   ECG  0 Filed at: 12/02/2017 1224   Age  2 Filed at: 12/02/2017 1224   Risk Factors  1 Filed at: 12/02/2017 1224   Troponin  0 Filed at: 12/02/2017 1224   Heart Score Risk Calculator   History  0 Filed at: 12/02/2017 1224   ECG  0 Filed at: 12/02/2017 1224   Age  2 Filed at: 12/02/2017 1224   Risk Factors  1 Filed at: 12/02/2017 1224   Troponin  0 Filed at: 12/02/2017 1224   HEART Score  3 Filed at: 12/02/2017 1224   HEART Score  3 Filed at: 12/02/2017 1224            [facility]  Number of Diagnoses or Management Options  Bronchitis: established and worsening  Hypoxia: new and requires workup  Wheezing: established and worsening  Diagnosis management comments: Patient's O2 sats noted to be mid to high 80s on room air while at rest  She has no wheezing at present  Case discussed with [facility] attending and patient, who agrees to stay in the hospital         Amount and/or Complexity of Data Reviewed  Clinical lab tests: ordered and reviewed  Tests in the radiology section of CPT®: ordered and reviewed  Tests in the medicine section of CPT®: ordered and reviewed  Decide to obtain previous medical records or to obtain history from someone other than the patient: yes  Obtain history from someone other than the patient: yes  Review and summarize past medical records: yes  Discuss the patient with other providers: yes  Independent visualization of images, tracings, or specimens: yes    Risk of Complications, Morbidity, and/or Mortality  Presenting problems: moderate  Diagnostic procedures: low  Management options: low    Patient Progress  Patient progress: improved    CritCare Time    Disposition  Final diagnoses:   Bronchitis   Wheezing   Hypoxia     Time reflects when diagnosis was documented in both MDM as applicable and the Disposition within this note     Time User Action Codes Description Comment    12/2/2017  2:59 PM Mayra Allred Add [J40] Bronchitis     12/2/2017  2:59 PM Mayra Allred Add [R06 2] Wheezing     12/2/2017  3:12 PM Robert Allred 57 [R09 02] Hypoxia       ED Disposition     ED Disposition Condition Comment    Admit  Case was discussed with Dr Abdi Bruce and the patient's admission status was agreed to be Admission Status: observation status to the service of Dr Abdi Bruce ([facility])  Follow-up Information    None       No discharge procedures on file      ED Provider  Electronically Signed by Linda Mena , DO  Resident  12/02/17 [address] [employer], [facility]  Resident  12/02/17 1017 [facility]  Resident  12/02/17 9738

## 2017-12-02 NOTE — DISCHARGE INSTRUCTIONS
Acute Bronchitis   WHAT YOU NEED TO KNOW:   Acute bronchitis is swelling and irritation in the air passages of your lungs  This irritation may cause you to cough or have other breathing problems  Acute bronchitis often starts because of another illness, such as a cold or the flu  The illness spreads from your nose and throat to your windpipe and airways  Bronchitis is often called a chest cold  Acute bronchitis lasts about 3 to 6 weeks and is usually not a serious illness  Your cough can last for several weeks  DISCHARGE INSTRUCTIONS:   Return to the emergency department if:   · You cough up blood  · Your lips or fingernails turn blue  · You feel like you are not getting enough air when you breathe  Contact your healthcare provider if:   · You have a fever  · Your breathing problems do not go away or get worse  · Your cough does not get better within 4 weeks  · You have questions or concerns about your condition or care  Self-care:   · Get more rest   Rest helps your body to heal  Slowly start to do more each day  Rest when you feel it is needed  · Avoid irritants in the air  Avoid chemicals, fumes, and dust  Wear a face mask if you must work around dust or fumes  Stay inside on days when air pollution levels are high  If you have allergies, stay inside when pollen counts are high  Do not use aerosol products, such as spray-on deodorant, bug spray, and hair spray  · Do not smoke or be around others who smoke  Nicotine and other chemicals in cigarettes and cigars damages the cilia that move mucus out of your lungs  Ask your healthcare provider for information if you currently smoke and need help to quit  E-cigarettes or smokeless tobacco still contain nicotine  Talk to your healthcare provider before you use these products  · Drink liquids as directed  Liquids help keep your air passages moist and help you cough up mucus   You may need to drink more liquids when you have acute bronchitis  Ask how much liquid to drink each day and which liquids are best for you  · Use a humidifier or vaporizer  Use a cool mist humidifier or a vaporizer to increase air moisture in your home  This may make it easier for you to breathe and help decrease your cough  Decrease risk for acute bronchitis:   · Get the vaccinations you need  Ask your healthcare provider if you should get vaccinated against the flu or pneumonia  · Prevent the spread of germs  You can decrease your risk of acute bronchitis and other illnesses by doing the following:     [facility] your hands often with soap and water  Carry germ-killing hand lotion or gel with you  You can use the lotion or gel to clean your hands when soap and water are not available  ¨ Do not touch your eyes, nose, or mouth unless you have washed your hands first     ¨ Always cover your mouth when you cough to prevent the spread of germs  It is best to cough into a tissue or your shirt sleeve instead of into your hand  Ask those around you cover their mouths when they cough  ¨ Try to avoid people who have a cold or the flu  If you are sick, stay away from others as much as possible  Medicines: Your healthcare provider may  give you any of the following:  · Ibuprofen or acetaminophen  are medicines that help lower your fever  They are available without a doctor's order  Ask your healthcare provider which medicine is right for you  Ask how much to take and how often to take it  Follow directions  These medicines can cause stomach bleeding if not taken correctly  Ibuprofen can cause kidney damage  Do not take ibuprofen if you have kidney disease, an ulcer, or allergies to aspirin  Acetaminophen can cause liver damage  Do not take more than 4,000 milligrams in 24 hours  · Decongestants  help loosen mucus in your lungs and make it easier to cough up  This can help you breathe easier  · Cough suppressants  decrease your urge to cough   If your cough produces mucus, do not take a cough suppressant unless your healthcare provider tells you to  Your healthcare provider may suggest that you take a cough suppressant at night so you can rest     · Inhalers  may be given  Your healthcare provider may give you one or more inhalers to help you breathe easier and cough less  An inhaler gives your medicine to open your airways  Ask your healthcare provider to show you how to use your inhaler correctly  · Take your medicine as directed  Contact your healthcare provider if you think your medicine is not helping or if you have side effects  Tell him of her if you are allergic to any medicine  Keep a list of the medicines, vitamins, and herbs you take  Include the amounts, and when and why you take them  Bring the list or the pill bottles to follow-up visits  Carry your medicine list with you in case of an emergency  Follow up with your healthcare provider as directed:  Write down questions you have so you will remember to ask them during your follow-up visits  © 2017 [address] [employer] Information is for End User's use only and may not be sold, redistributed or otherwise used for commercial purposes  All illustrations and images included in CareNotes® are the copyrighted property of [employer]  or Mitchel Anthony  The above information is an  only  It is not intended as medical advice for individual conditions or treatments  Talk to your doctor, nurse or pharmacist before following any medical regimen to see if it is safe and effective for you

## 2017-12-02 NOTE — H&P
History and Physical - [facility] Internal Medicine    Patient Information: Jair Louis 67 y o  female MRN: [id number]  Unit/Bed#: VITALY Encounter: [id number]  Admitting Physician: Joe Regalado DO  PCP: Trae Barragan MD  Date of Admission:  12/02/17    Assessment/Plan:    Hospital Problem List:     Active Problems:    Acute bronchitis    Oxygen dependent      Plan for the Primary Problem(s): # Oxygen dependency - secondary to acute bronchitis  - no sign of acute respiratory failure  - already completed course of z-pack and cxr w/o consolidation  - Will place on IV steroids  - Duonebs  - Attempt to wean off oxygen  - Already has appt with pulm as outpt on Monday to evaluate for obstructive disease    Plan for Additional Problems:   # HTN - cont norvasc    # Hypothyroidism - cont levothyroxine    VTE Prophylaxis: Heparin  / sequential compression device   Code Status: Full code  POLST: There is no POLST form on file for this patient (pre-hospital)    Anticipated Length of Stay:  Patient will be admitted on an Observation basis with an anticipated length of stay of  < 2 midnights  Justification for Hospital Stay: Clinical condition    Total Time for Visit, including Counseling / Coordination of Care: 1 hour  Greater than 50% of this total time spent on direct patient counseling and coordination of care  Chief Complaint:   Cough/sob    History of Present Illness:    Jair Louis is a 67 y o  female medical hx significant for HTN, hx of bronchitis for which was admitted in late oct there was concern for underlying obstructive disease and was to follow up with pulmonary for PFT  She actually has an upcoming appt on Monday  She states for the past fews days has been having non productive cough, wheezing and mild SOB  She had presented to her pcp and was given zpack which she has completed  She rpts reoccurence of her symptoms today thus prompting her to come in to the ED      Upon presentation to the ED, was given nebs and IV steroids  She reports feeling better but upon obtain pox with ambulation desaturated to 89%  At present she is not sob, she denies cp  She denies fever/chills  Review of Systems:    Review of Systems   Constitutional: Negative  HENT: Negative  Eyes: Negative  Respiratory: Negative  Resolved dyspnea and wheezing   Cardiovascular: Negative  Gastrointestinal: Negative  Endocrine: Negative  Genitourinary: Negative  Musculoskeletal: Negative  Skin: Negative  Allergic/Immunologic: Negative  Neurological: Negative  Hematological: Negative  Psychiatric/Behavioral: Negative  Past Medical and Surgical History:     Past Medical History:   Diagnosis Date    Cardiac disease     "leaky valve"    Disease of thyroid gland     Murmur, cardiac        Past Surgical History:   Procedure Laterality Date    CARPAL TUNNEL RELEASE      CHOLECYSTECTOMY      JOINT REPLACEMENT      b/l knee        Meds/Allergies:    Prior to Admission medications    Medication Sig Start Date End Date Taking?  Authorizing Provider   albuterol (2 5 mg/3 mL) 0 083 % nebulizer solution Take 3 mL by nebulization every 6 (six) hours as needed for wheezing or shortness of breath 10/30/17  Yes Jah Villafuerte MD   amLODIPine (NORVASC) 5 mg tablet Take 1 tablet by mouth daily 10/31/17  Yes Jah Villafuerte MD   aspirin (ECOTRIN LOW STRENGTH) 81 mg EC tablet Take 1 tablet by mouth daily 10/31/17  Yes Jah Villafuerte MD   b complex vitamins capsule Take 1 capsule by mouth daily   Yes Historical Provider, MD   calcium carbonate (OS-SYLVESTER) 1250 (500 Ca) MG tablet Take 1 tablet by mouth 2 (two) times a day     Yes Historical Provider, MD   Fesoterodine Fumarate ER (TOVIAZ) 8 MG TB24 Take 8 mg by mouth daily   Yes Historical Provider, MD   loratadine (CLARITIN) 10 mg tablet Take 10 mg by mouth daily   Yes Historical Provider, MD   Multiple Vitamin (MULTIVITAMIN) tablet Take 1 tablet by mouth 2 (two) times a day   Yes Historical Provider, MD   omeprazole (PriLOSEC OTC) 20 MG tablet Take 40 mg by mouth 2 (two) times a day     Yes Historical Provider, MD   simvastatin (ZOCOR) 40 mg tablet Take 40 mg by mouth daily at bedtime   Yes Historical Provider, MD   temazepam (RESTORIL) 7 5 mg capsule Take 15 mg by mouth daily at bedtime as needed for sleep   Yes Historical Provider, MD   Glucosamine Sulfate-MSM (MSM/GLUCOSAMINE) 250-250 MG CAPS Take 1 tablet by mouth 2 (two) times a day  12/2/17 Yes Historical Provider, MD   benzonatate (TESSALON PERLES) 100 mg capsule Take 1 capsule by mouth 3 (three) times a day as needed for cough 12/2/17   Mayra Allred DO   levothyroxine 50 mcg tablet Take 50 mcg by mouth daily    Historical Provider, MD   predniSONE 20 mg tablet Take 2 tablets by mouth daily for 4 days 12/3/17 12/7/17  Mayra Allred DO   aluminum-magnesium hydroxide-simethicone (MYLANTA) 862-610-46 mg/5 mL suspension Take 30 mL by mouth every 4 (four) hours as needed for indigestion or heartburn 10/30/17 12/2/17  Carlos Cox MD   atorvastatin (LIPITOR) 10 mg tablet Take 1 tablet by mouth daily 10/31/17 12/2/17  Carlos Cox MD   sucralfate (CARAFATE) 1 g/10 mL suspension Take 10 mL by mouth every 6 (six) hours 10/30/17 12/2/17  Carlos Cox MD     I have reviewed home medications with patient personally  Allergies:    Allergies   Allergen Reactions    Latex Rash    Neosporin [Neomycin-Bacitracin Zn-Polymyx] Rash       Social History:     Marital Status: Single   Occupation:   Patient Pre-hospital Living Situation: Resides with sister  Patient Pre-hospital Level of Mobility: Independent of adls  Patient Pre-hospital Diet Restrictions: none  Substance Use History:   History   Alcohol Use No     History   Smoking Status    Never Smoker   Smokeless Tobacco    Never Used   As an adolescent; but had second hand smoking exposure  History   Drug Use No       Family History:    non-contributory    Physical Exam:     Vitals:   Blood Pressure: 152/65 (12/02/17 1500)  Pulse: 86 (12/02/17 1530)  Temperature: 97 6 °F (36 4 °C) (12/02/17 1040)  Temp Source: Oral (12/02/17 1040)  Respirations: 22 (12/02/17 1530)  Weight - Scale: 102 kg (225 lb 1 4 oz) (12/02/17 1039)  SpO2: 98 % (12/02/17 1530)    General Appearance:  Alert, cooperative, no distress, appears stated age   Head:  Normocephalic, without obvious abnormality, atraumatic   Neck: Supple   Lungs:   Good air entry, scattered wheezing   Chest Wall:  No tenderness or deformity    Heart:  Regular rate and rhythm, S1 and S2 normal, no murmur, rub or gallop   Abdomen:   Soft, non-tender, bowel sounds active all four quadrants,  no masses, no organomegaly   Extremities: Extremities normal, atraumatic, no cyanosis or edema   Pulses: 2+ and symmetric all extremities   Skin: Skin color, texture, turgor normal, no rashes or lesions   Lymph nodes:    Neurologic: CNII-XII intact, speech fluent, comprehensible, no facial asymmetry; normal strength 5/5 in all major muscle groups, sensation and reflexes throughout       Additional Data:     Lab Results: I have personally reviewed pertinent reports  Results from last 7 days  Lab Units 12/02/17  1117   WBC Thousand/uL 8 99   HEMOGLOBIN g/dL 11 7   HEMATOCRIT % 37 7   PLATELETS Thousands/uL 337   NEUTROS PCT % 68   LYMPHS PCT % 15   MONOS PCT % 8   EOS PCT % 8*       Results from last 7 days  Lab Units 12/02/17  1117   SODIUM mmol/L 143   POTASSIUM mmol/L 3 7   CHLORIDE mmol/L 107   CO2 mmol/L 30   BUN mg/dL 12   CREATININE mg/dL 0 80   CALCIUM mg/dL 9 5   TOTAL PROTEIN g/dL 7 2   BILIRUBIN TOTAL mg/dL 0 40   ALK PHOS U/L 76   ALT U/L 22   AST U/L 23   GLUCOSE RANDOM mg/dL 143*       Results from last 7 days  Lab Units 12/02/17  1117   INR  0 98       Imaging: I have personally reviewed pertinent reports        Xr Chest 1 View Portable    Result Date: 12/2/2017  Narrative: CHEST INDICATION:  wheezing, hypoxia, r/o PNA vs CHF  History taken directly from the electronic ordering system  Shortness of Breath (Pt was seen at family doctor and was dx with bronchitis  Pt has been taking abx with no relief  COMPARISON:  10/29/2017 VIEWS:   AP frontal IMAGES:  1 FINDINGS:     Cardiomediastinal silhouette appears unremarkable  Lung markings are crowded secondary to low lung volumes  Within limitations of this examination there is no focal airspace opacity to suggest pneumonia  No pneumothorax or pleural effusion  Visualized osseous structures appear within normal limits for the patient's age  Impression: No active pulmonary disease on examination which is somewhat limited secondary to low lung volumes  Workstation performed: [id number]       EKG, Pathology, and Other Studies Reviewed on Admission:   · EKG:     Allscripts Records Reviewed: Yes     ** Please Note: Dragon 360 Dictation voice to text software may have been used in the creation of this document   **

## 2017-12-02 NOTE — ED NOTES
MD made aware of O2 sat  Discharge cancelled - patient to be admitted       Tiffany Snowden RN  12/02/17 8216

## 2017-12-03 ENCOUNTER — APPOINTMENT (INPATIENT)
Dept: RADIOLOGY | Facility: HOSPITAL | Age: 77
DRG: 178 | End: 2017-12-03
Payer: MEDICARE

## 2017-12-03 PROBLEM — I10 HYPERTENSION: Status: ACTIVE | Noted: 2017-12-03

## 2017-12-03 PROBLEM — J18.9 COMMUNITY ACQUIRED PNEUMONIA OF LEFT LOWER LOBE OF LUNG: Status: ACTIVE | Noted: 2017-12-03

## 2017-12-03 LAB
ANION GAP SERPL CALCULATED.3IONS-SCNC: 5 MMOL/L (ref 4–13)
ATRIAL RATE: 88 BPM
BUN SERPL-MCNC: 18 MG/DL (ref 5–25)
CALCIUM SERPL-MCNC: 9.7 MG/DL (ref 8.3–10.1)
CHLORIDE SERPL-SCNC: 107 MMOL/L (ref 100–108)
CO2 SERPL-SCNC: 30 MMOL/L (ref 21–32)
CREAT SERPL-MCNC: 0.75 MG/DL (ref 0.6–1.3)
ERYTHROCYTE [DISTWIDTH] IN BLOOD BY AUTOMATED COUNT: 16.4 % (ref 11.6–15.1)
FLUAV AG SPEC QL: NORMAL
FLUBV AG SPEC QL: NORMAL
GFR SERPL CREATININE-BSD FRML MDRD: 77 ML/MIN/1.73SQ M
GLUCOSE SERPL-MCNC: 154 MG/DL (ref 65–140)
HCT VFR BLD AUTO: 38 % (ref 34.8–46.1)
HGB BLD-MCNC: 11.5 G/DL (ref 11.5–15.4)
MCH RBC QN AUTO: 24.7 PG (ref 26.8–34.3)
MCHC RBC AUTO-ENTMCNC: 30.3 G/DL (ref 31.4–37.4)
MCV RBC AUTO: 82 FL (ref 82–98)
P AXIS: 57 DEGREES
PLATELET # BLD AUTO: 356 THOUSANDS/UL (ref 149–390)
PMV BLD AUTO: 9.4 FL (ref 8.9–12.7)
POTASSIUM SERPL-SCNC: 4.6 MMOL/L (ref 3.5–5.3)
PR INTERVAL: 224 MS
QRS AXIS: -16 DEGREES
QRSD INTERVAL: 80 MS
QT INTERVAL: 380 MS
QTC INTERVAL: 459 MS
RBC # BLD AUTO: 4.65 MILLION/UL (ref 3.81–5.12)
RSV B RNA SPEC QL NAA+PROBE: NORMAL
S PNEUM AG UR QL: NEGATIVE
SODIUM SERPL-SCNC: 142 MMOL/L (ref 136–145)
T WAVE AXIS: 70 DEGREES
TSH SERPL DL<=0.05 MIU/L-ACNC: 0.29 UIU/ML (ref 0.36–3.74)
VENTRICULAR RATE: 88 BPM
WBC # BLD AUTO: 9.04 THOUSAND/UL (ref 4.31–10.16)

## 2017-12-03 PROCEDURE — 84443 ASSAY THYROID STIM HORMONE: CPT | Performed by: NURSE PRACTITIONER

## 2017-12-03 PROCEDURE — 94664 DEMO&/EVAL PT USE INHALER: CPT

## 2017-12-03 PROCEDURE — 85027 COMPLETE CBC AUTOMATED: CPT | Performed by: INTERNAL MEDICINE

## 2017-12-03 PROCEDURE — 80048 BASIC METABOLIC PNL TOTAL CA: CPT | Performed by: INTERNAL MEDICINE

## 2017-12-03 PROCEDURE — 87040 BLOOD CULTURE FOR BACTERIA: CPT | Performed by: NURSE PRACTITIONER

## 2017-12-03 PROCEDURE — 94760 N-INVAS EAR/PLS OXIMETRY 1: CPT

## 2017-12-03 PROCEDURE — 71020 HB CHEST X-RAY 2VW FRONTAL&LATL: CPT

## 2017-12-03 PROCEDURE — 87449 NOS EACH ORGANISM AG IA: CPT | Performed by: NURSE PRACTITIONER

## 2017-12-03 PROCEDURE — 94762 N-INVAS EAR/PLS OXIMTRY CONT: CPT

## 2017-12-03 RX ADMIN — LORATADINE 10 MG: 10 TABLET ORAL at 09:52

## 2017-12-03 RX ADMIN — HEPARIN SODIUM 5000 UNITS: 5000 INJECTION, SOLUTION INTRAVENOUS; SUBCUTANEOUS at 13:06

## 2017-12-03 RX ADMIN — METHYLPREDNISOLONE SODIUM SUCCINATE 40 MG: 40 INJECTION, POWDER, FOR SOLUTION INTRAMUSCULAR; INTRAVENOUS at 22:00

## 2017-12-03 RX ADMIN — PANTOPRAZOLE SODIUM 40 MG: 40 TABLET, DELAYED RELEASE ORAL at 06:26

## 2017-12-03 RX ADMIN — LEVOTHYROXINE SODIUM 50 MCG: 50 TABLET ORAL at 09:51

## 2017-12-03 RX ADMIN — ASPIRIN 81 MG: 81 TABLET, COATED ORAL at 09:51

## 2017-12-03 RX ADMIN — HEPARIN SODIUM 5000 UNITS: 5000 INJECTION, SOLUTION INTRAVENOUS; SUBCUTANEOUS at 06:27

## 2017-12-03 RX ADMIN — TEMAZEPAM 15 MG: 15 CAPSULE ORAL at 22:12

## 2017-12-03 RX ADMIN — HEPARIN SODIUM 5000 UNITS: 5000 INJECTION, SOLUTION INTRAVENOUS; SUBCUTANEOUS at 22:14

## 2017-12-03 RX ADMIN — ACETAMINOPHEN 650 MG: 325 TABLET ORAL at 16:58

## 2017-12-03 RX ADMIN — PANTOPRAZOLE SODIUM 40 MG: 40 TABLET, DELAYED RELEASE ORAL at 16:57

## 2017-12-03 RX ADMIN — AMLODIPINE BESYLATE 5 MG: 5 TABLET ORAL at 09:51

## 2017-12-03 RX ADMIN — CEFEPIME HYDROCHLORIDE 1000 MG: 1 INJECTION, SOLUTION INTRAVENOUS at 18:49

## 2017-12-03 RX ADMIN — PRAVASTATIN SODIUM 40 MG: 40 TABLET ORAL at 16:58

## 2017-12-03 RX ADMIN — ACETAMINOPHEN 650 MG: 325 TABLET ORAL at 22:12

## 2017-12-03 RX ADMIN — Medication 1 TABLET: at 13:06

## 2017-12-03 RX ADMIN — Medication 1 TABLET: at 16:57

## 2017-12-03 RX ADMIN — METHYLPREDNISOLONE SODIUM SUCCINATE 40 MG: 40 INJECTION, POWDER, FOR SOLUTION INTRAMUSCULAR; INTRAVENOUS at 09:52

## 2017-12-03 NOTE — SOCIAL WORK
CM intern met with patient at bedside, patient alert and oriented  Patient has been converted from observation to inpatient status  OBS status explained to patient, patient signed OBS form, copy given to patient and copy sent to medical records for scanning  Patient lives with her sister in a one story home in [place]  There is a ramp entrance which patient is able to navigate without concern  Patient utilizes a walker, cane, and rollator to ambulate, depending on where she is at  Patient's chart states that she is oxygen dependent but patient denies the use of O2 at home  Patient does utilize a nebulizer from [employer]  Patient reports that she is independent with all ADL's  Patient reports history of rehab following her individual knee replacements: she went to [facility] in October 2016 and [facility] in [facility] in April 2017  Patient reports history of HHC with [facility] but denies current services  Patient is unsure if she wants to resume [address] at this time and wants to wait until after she meets with her Pulmonologist tomorrow  As such, CM intern provided patient with list of VNA agencies should she decide to want services  Patient has her first appointment scheduled with her Pulmonologist tomorrow  Patient is unsure of her doctor's name but reports that the office is at [facility] [address]  [facility] in [place]  Patient's Cardiologist is Dr Alcira Smith and her PCP is Dr Farideh Harrell, both of whom she sees as recommended  Patient utilizes [facility] on [address] and is able to afford all co-pays  Patient denies history of MH / substance abuse  Patient does not have AD but her POA is her 's wife, Rubén Perez, [phone]  Patient provided with information on AD at her request  Patient does not work but she does drive and will drive herself home at d/c  Patient does not appear to have any needs at this time  CM Department to continue to assess and will follow through discharge

## 2017-12-03 NOTE — PROGRESS NOTES
Progress Note - Yary Lindo 67 y o  female MRN: [id number]    Unit/Bed#: -01 Encounter: [id number]    * Shortness of breath   Assessment & Plan    · Present on admission, secondary to Left Lower Lobe Pneumonia with O2 dependence, hypoxia on admission  · Patient did finish a course of antibiotic outpatient from PCP  · Rapid flu negative  · She continues to c/o shortness of breath  +Dyspnea  Afeb, no leukocytosis noted  · Chest Xray reviewed note with ? Left Lower lobe Infiltrate  Discussed with attending will consider Chest Xray PA and Lateral  Consult Pulmonary  · Continue resp  Protocol with PRN neb treatments, mucinex, IV steroids, supplemental O2   · Monitor for Leukocytosis  Hypertension   Assessment & Plan    BP acceptable  Continue Norvasc  Continue to monitor  Hypothyroid   Assessment & Plan    TSH pending  Continue levothyroxine  CAP: Community Acquired Pneumonia: Per chest Xray  Left Lower Lobe pneumonia  · Will check Strep Pneumonia, legionella UA, MRSA, Blood cultures X2, will monitor for Leukocytosis  · Cefepime initiated  · Pulmonary consult  · Resp  Protocol with PRN neb treatments  · There is a possibility of underline COPD, can benefit from outpatient Pulm function test      VTE Pharmacologic Prophylaxis:   Pharmacologic: Heparin  Mechanical: Mechanical VTE prophylaxis in place  Patient Centered Rounds: I have performed bedside rounds with nursing staff today  Discussions with Specialists or Other Care Team Provider: Attending, PN  Education and Discussions with Family / Patient: Patient  Time Spent for Care: 36  More than 50% of total time spent on counseling and coordination of care as described above  Current Length of Stay: 0 day(s)  Current Patient Status: Inpatient   Certification Statement: The patient will continue to require additional inpatient hospital stay due to SOB  Not improved  Discharge Plan: Not medically cleared  Code Status: Level 1 - Full Code    Subjective: "not better, I had trouble walking to the bathroom, + SOB, I need the oxygen, I feel better with it "    Objective:   Vitals:   Temp (24hrs), Av 2 °F (36 8 °C), Min:97 9 °F (36 6 °C), Max:98 5 °F (36 9 °C)    HR:  [71-96] 72  Resp:  [13-26] 20  BP: (119-160)/(56-77) 127/62  SpO2:  [92 %-100 %] 94 %  Body mass index is 52 32 kg/m²  Input and Output Summary (last 24 hours):     No intake or output data in the 24 hours ending 17 1103    Physical Exam:     Physical Exam   Constitutional: She is oriented to person, place, and time  She appears ill  Nasal cannula in place  obesed   Neck: Normal range of motion  Neck supple  No JVD present  Thyromegaly present  Cardiovascular: Normal rate and regular rhythm  Murmur heard  Pulmonary/Chest: Accessory muscle usage present  She has wheezes  She has rhonchi in the right lower field and the left lower field  Abdominal: Soft  Bowel sounds are normal    Musculoskeletal: Normal range of motion  Neurological: She is alert and oriented to person, place, and time  Skin: Skin is warm and dry  Psychiatric: She has a normal mood and affect  Nursing note and vitals reviewed         Additional Data:   Labs:    Results from last 7 days  Lab Units 17  0433 17  1117   WBC Thousand/uL 9 04 8 99   HEMOGLOBIN g/dL 11 5 11 7   HEMATOCRIT % 38 0 37 7   PLATELETS Thousands/uL 356 337   NEUTROS PCT %  --  68   LYMPHS PCT %  --  15   MONOS PCT %  --  8   EOS PCT %  --  8*       Results from last 7 days  Lab Units 17  0433 17  1117   SODIUM mmol/L 142 143   POTASSIUM mmol/L 4 6 3 7   CHLORIDE mmol/L 107 107   CO2 mmol/L 30 30   BUN mg/dL 18 12   CREATININE mg/dL 0 75 0 80   CALCIUM mg/dL 9 7 9 5   TOTAL PROTEIN g/dL  --  7 2   BILIRUBIN TOTAL mg/dL  --  0 40   ALK PHOS U/L  --  76   ALT U/L  --  22   AST U/L  --  23   GLUCOSE RANDOM mg/dL 154* 143*       Results from last 7 days  Lab Units 12/02/17  1117   INR  0 98       * I Have Reviewed All Lab Data Listed Above  * Additional Pertinent Lab Tests Reviewed: [address] Admission Reviewed    Imaging:    Imaging Reports Reviewed Today Include: Xray    Cultures:   Blood Culture: No results found for: BLOODCX  Urine Culture: No results found for: URINECX  Sputum Culture: No components found for: SPUTUMCX  Wound Culture: No results found for: WOUNDCULT    Last 24 Hours Medication List:     amLODIPine 5 mg Oral Daily   aspirin 81 mg Oral Daily   b complex vitamins 1 capsule Oral Daily   calcium carbonate 1 tablet Oral BID With Meals   heparin (porcine) 5,000 Units Subcutaneous Q8H [address]   levothyroxine 50 mcg Oral Daily   loratadine 10 mg Oral Daily   methylPREDNISolone sodium succinate 40 mg Intravenous Q12H [address]   pantoprazole 40 mg Oral BID AC   pravastatin 40 mg Oral Daily With Dinner        Today, Patient Was Seen By: MABEL Stewart    ** Please Note: Dragon 360 Dictation voice to text software may have been used in the creation of this document   **

## 2017-12-03 NOTE — RESPIRATORY THERAPY NOTE
68 yo female with acute bronchitis admitted today  Pt is on continuous pulse ox at this time  Pt awake and alert complaining of SOB after walking to the bathroom with no O2 on  Added extension tubing to allow pt to keep O2 while ambulation  Treatment was given at this time  No indication to change treatments from PRN  Continue with current orders

## 2017-12-03 NOTE — PLAN OF CARE
Problem: DISCHARGE PLANNING - CARE MANAGEMENT  Goal: Discharge to post-acute care or home with appropriate resources  INTERVENTIONS:  - Conduct assessment to determine patient/family and health care team treatment goals, and need for post-acute services based on payer coverage, community resources, and patient preferences, and barriers to discharge  - Address psychosocial, clinical, and financial barriers to discharge as identified in assessment in conjunction with the patient/family and health care team  - Arrange appropriate level of post-acute services according to patient's   needs and preference and payer coverage in collaboration with the physician and health care team  - Communicate with and update the patient/family, physician, and health care team regarding progress on the discharge plan  - Arrange appropriate transportation to post-acute venues  Outcome: Progressing  CM intern met with patient at bedside, patient alert and oriented  Patient has been converted from observation to inpatient status  OBS status explained to patient, patient signed OBS form, copy given to patient and copy sent to medical records for scanning  Patient lives with her sister in a one story home in [facility]  There is a ramp entrance which patient is able to navigate without concern  Patient utilizes a walker, cane, and rollator to ambulate, depending on where she is at  Patient's chart states that she is oxygen dependent but patient denies the use of O2 at home  Patient does utilize a nebulizer from [employer]  Patient reports that she is independent with all ADL's  Patient reports history of rehab following her individual knee replacements: she went to [address] [place] [address] in October 2016 and [facility] in [facility] in April 2017  Patient reports history of HHC with [facility] but denies current services   Patient is unsure if she wants to resume [facility] at this time and wants to wait until after she meets with her Pulmonologist tomorrow  As such, CM intern provided patient with list of A agencies should she decide to want services  Patient has her first appointment scheduled with her Pulmonologist tomorrow  Patient is unsure of her doctor's name but reports that the office is at [facility]  Patient's Cardiologist is Dr Mireille West and her PCP is Dr Etienne Navarro, both of whom she sees as recommended  Patient utilizes [facility] on [address] and is able to afford all co-pays  Patient denies history of MH / substance abuse  Patient does not have AD but her POA is her 's wife, Laurent Mckay, [phone]  Patient provided with information on AD at her request  Patient does not work but she does drive and will drive herself home at d/c  Patient does not appear to have any needs at this time  CM Department to continue to assess and will follow through discharge

## 2017-12-03 NOTE — CASE MANAGEMENT
Initial Clinical Review    Admission: Date/Time/Statement: 12/3/17 AT 1103 ADMIT INPATIENT  (12/2/17 AT 1517 OBSERVATION)    12/03/17 1103  Inpatient Admission Once     Transfer Service: General Medicine       Question Answer Comment   Admitting Physician Yani Modi    Level of Care Med Surg    Estimated length of stay More than 2 Midnights    Certification I certify that inpatient services are medically necessary for this patient for a duration of greater than two midnights  See H&P and MD Progress Notes for additional information about the patient's course of treatment  ED: Date/Time/Mode of Arrival:   ED Arrival Information     Expected Arrival Acuity Means of Arrival Escorted By Service Admission Type    - 12/2/2017 10:29 Urgent Walk-In Self General Medicine Urgent    Arrival Complaint    TROUBLE BREATHING          Chief Complaint:   Chief Complaint   Patient presents with    Shortness of Breath     Pt was seen at family doctor and was dx with bronchitis  Pt has been taking abx with no relief  Denies hx of asthma, COPD  Pt not a smoker  Chief Complaint:   Cough/ SOB     History of Present Illness:   Wing Rodriguez is a 67 y o  female medical hx significant for HTN, hx of bronchitis for which was admitted in late oct there was concern for underlying obstructive disease and was to follow up with pulmonary for PFT  She actually has an upcoming appt on Monday  She states for the past fews days has been having non productive cough, wheezing and mild SOB  She had presented to her pcp and was given zpack which she has completed  She rpts reoccurence of her symptoms today thus prompting her to come in to the ED      Upon presentation to the ED, was given nebs and IV steroids  She reports feeling better but upon obtain pox with ambulation desaturated to 89%  At present she is not sob, she denies cp    She denies fever/chills      Physical Exam:  Lungs:   Good air entry, scattered wheezing       ED Vital Signs:   ED Triage Vitals   Temperature Pulse Respirations Blood Pressure SpO2   12/02/17 1040 12/02/17 1039 12/02/17 1039 12/02/17 1039 12/02/17 1039   97 6 °F (36 4 °C) 102 22 (!) 180/83 (!) 88 %      Temp Source Heart Rate Source Patient Position - Orthostatic VS BP Location FiO2 (%)   12/02/17 1039 12/02/17 1039 12/02/17 1039 12/02/17 1039 --   Oral Monitor Lying Left arm       Pain Score       12/02/17 2151       3        Wt Readings from Last 1 Encounters:   12/02/17 102 kg (225 lb 1 4 oz)        12/02 0701  12/03 0700 12/03 0701  12/03 1420  Most Recent    Temperature (°F) 97 698 5 98 3  98 3 (36 8)    Pulse 71102 72  72    Respirations 1331 20  20    Blood Pressure 119/56180/83 127/62  127/62    SpO2 (%) 88100 9096  94        LABS/Diagnostic Test Results:   Results from last 7 days  Lab Units 12/02/17  1117   WBC Thousand/uL 8 99   HEMOGLOBIN g/dL 11 7   HEMATOCRIT % 37 7   PLATELETS Thousands/uL 337   NEUTROS PCT % 68   LYMPHS PCT % 15   MONOS PCT % 8   EOS PCT % 8*       Results from last 7 days  Lab Units 12/02/17  1117   SODIUM mmol/L 143   POTASSIUM mmol/L 3 7   CHLORIDE mmol/L 107   CO2 mmol/L 30   BUN mg/dL 12   CREATININE mg/dL 0 80   CALCIUM mg/dL 9 5   TOTAL PROTEIN g/dL 7 2   BILIRUBIN TOTAL mg/dL 0 40   ALK PHOS U/L 76   ALT U/L 22   AST U/L 23   GLUCOSE RANDOM mg/dL 143*      Lab Units 12/02/17  1117   INR   0 98             CHEST X RAY -  No active pulmonary disease on examination which is somewhat limited secondary to low lung volumes      ED Treatment:   Medication Administration from 12/02/2017 1029 to 12/02/2017 1625       Date/Time Order Dose Route Action Action by Comments     12/02/2017 1109 ipratropium (ATROVENT) 0 02 % inhalation solution 0 5 mg 0 5 mg Nebulization Given Tiffany Snowden RN      12/02/2017 1109 albuterol inhalation solution 2 5 mg 2 5 mg Nebulization Given Tiffany Snowden RN      12/02/2017 1110 methylPREDNISolone sodium succinate (Solu-MEDROL) injection 125 mg 125 mg Intravenous Given Lucía Burroughs RN      12/02/2017 1132 ondansetron (ZOFRAN) injection 4 mg 4 mg Intravenous Given Lucía Burroughs RN      12/02/2017 1211 albuterol inhalation solution 2 5 mg 2 5 mg Nebulization Given Lucía Burroughs RN      12/02/2017 1211 ipratropium (ATROVENT) 0 02 % inhalation solution 0 5 mg 0 5 mg Nebulization Given Lucía Burroughs RN           Past Medical/Surgical History: Active Ambulatory Problems     Diagnosis Date Noted    Shortness of breath 10/29/2017    GERD (gastroesophageal reflux disease) 10/29/2017    Hypothyroid 10/29/2017     Past Medical History:   Diagnosis Date    Arthritis     Cardiac disease     Coronary artery disease     Disease of thyroid gland     Hypertension     Murmur, cardiac        Admitting Diagnosis: Wheezing [R06 2]  Bronchitis [J40]  Hypoxia [R09 02]  Trouble breathing [R06 89]    Age/Sex: 67 y o  female      Assessment/Plan:   Hospital Problem List:    Active Problems:    Acute bronchitis    Oxygen dependent      Plan for the Primary Problem(s): # Oxygen dependency - secondary to acute bronchitis  - no sign of acute respiratory failure  - already completed course of z-pack and cxr w/o consolidation  - Will place on IV steroids  - Duonebs  - Attempt to wean off oxygen  - Already has appt with pulm as outpt on Monday to evaluate for obstructive disease     Plan for Additional Problems:   # HTN - cont norvasc     # Hypothyroidism - cont levothyroxine     VTE Prophylaxis: Heparin  / sequential compression device   Code Status: Full code  POLST: There is no POLST form on file for this patient (pre-hospital)     Anticipated Length of Stay:  Patient will be admitted on an Observation basis with an anticipated length of stay of  < 2 midnights       Justification for Hospital Stay: Clinical condition      Admission Orders:  12/3/17 AT 1103 ADMIT INPATIENT  (12/2/17 AT 1517 OBSERVATION)  TELEMETRY  VS Q4HRS    Up + OOB as Tolerated  SCD    Nasal O2 at 2L/min - TITRATE SpO2 > 90%  Continuous Pulse Oximetry    Regular House Diet    IV ACCESS    Scheduled Meds:   amLODIPine 5 mg Oral Daily   aspirin 81 mg Oral Daily   b complex vitamins 1 capsule Oral Daily   calcium carbonate 1 tablet Oral BID With Meals   heparin (porcine) 5,000 Units Subcutaneous Q8H [address]   levothyroxine 50 mcg Oral Daily   loratadine 10 mg Oral Daily   methylPREDNISolone sodium succinate 40 mg Intravenous Q12H HUMPHREY   pantoprazole 40 mg Oral BID AC   pravastatin 40 mg Oral Daily With Dinner     PRN Meds:    Acetaminophen 650 mg q6hrs prn given x 1    ipratropium-albuterol    ondansetron    Temazepam 15 mg HS prn given x 1       Hospitalist  Progress Notes Date of Service: 12/3/2017 10:55 AM     Shortness of breath   Assessment & Plan     · Present on admission, secondary to Acute Bronchitis with O2 dependence, hypoxia on admission  · Patient did finish a course of antibiotic outpatient from PCP  · Rapid flu negative  · She continues to c/o shortness of breath  +Dyspnea  Afeb, no leukocytosis noted  · Chest Xray reviewed note with ? Infiltrate  Discussed with attending will consider Chest Xray PA and Lateral  Consult Pulmonary  · Continue resp  Protocol with PRN neb treatments, mucinex, IV steroids, supplemental O2   · Monitor for Leukocytosis         Hypertension   Assessment & Plan     BP acceptable  Continue Norvasc  Continue to monitor         Hypothyroid   Assessment & Plan     TSH pending  Continue levothyroxine            VTE Pharmacologic Prophylaxis:   Pharmacologic: Heparin  Mechanical: Mechanical VTE prophylaxis in place      Current Patient Status: Inpatient   Certification Statement: The patient will continue to require additional inpatient hospital stay due to SOB   Not improved

## 2017-12-03 NOTE — ASSESSMENT & PLAN NOTE
· Present on admission, secondary to Acute Bronchitis with O2 dependence, hypoxia on admission  · Patient did finish a course of antibiotic outpatient from PCP  · Rapid flu negative  · She continues to c/o shortness of breath  +Dyspnea  Afeb, no leukocytosis noted  · Chest Xray reviewed note with ? Infiltrate  Discussed with attending will consider Chest Xray PA and Lateral  Consult Pulmonary  · Continue resp  Protocol with PRN neb treatments, mucinex, IV steroids, supplemental O2   · Monitor for Leukocytosis

## 2017-12-04 PROBLEM — E78.5 HYPERLIPIDEMIA: Chronic | Status: ACTIVE | Noted: 2017-12-04

## 2017-12-04 LAB
ANION GAP SERPL CALCULATED.3IONS-SCNC: 8 MMOL/L (ref 4–13)
BUN SERPL-MCNC: 28 MG/DL (ref 5–25)
CALCIUM SERPL-MCNC: 9.8 MG/DL (ref 8.3–10.1)
CHLORIDE SERPL-SCNC: 103 MMOL/L (ref 100–108)
CO2 SERPL-SCNC: 29 MMOL/L (ref 21–32)
CREAT SERPL-MCNC: 0.76 MG/DL (ref 0.6–1.3)
ERYTHROCYTE [DISTWIDTH] IN BLOOD BY AUTOMATED COUNT: 16.7 % (ref 11.6–15.1)
GFR SERPL CREATININE-BSD FRML MDRD: 76 ML/MIN/1.73SQ M
GLUCOSE SERPL-MCNC: 197 MG/DL (ref 65–140)
HCT VFR BLD AUTO: 38.8 % (ref 34.8–46.1)
HGB BLD-MCNC: 11.7 G/DL (ref 11.5–15.4)
L PNEUMO1 AG UR QL IA.RAPID: NEGATIVE
MCH RBC QN AUTO: 24.9 PG (ref 26.8–34.3)
MCHC RBC AUTO-ENTMCNC: 30.2 G/DL (ref 31.4–37.4)
MCV RBC AUTO: 83 FL (ref 82–98)
PLATELET # BLD AUTO: 258 THOUSANDS/UL (ref 149–390)
PMV BLD AUTO: 10.2 FL (ref 8.9–12.7)
POTASSIUM SERPL-SCNC: 4.5 MMOL/L (ref 3.5–5.3)
RBC # BLD AUTO: 4.69 MILLION/UL (ref 3.81–5.12)
SODIUM SERPL-SCNC: 140 MMOL/L (ref 136–145)
WBC # BLD AUTO: 14.04 THOUSAND/UL (ref 4.31–10.16)

## 2017-12-04 PROCEDURE — 94760 N-INVAS EAR/PLS OXIMETRY 1: CPT

## 2017-12-04 PROCEDURE — 80048 BASIC METABOLIC PNL TOTAL CA: CPT | Performed by: PHYSICIAN ASSISTANT

## 2017-12-04 PROCEDURE — 94762 N-INVAS EAR/PLS OXIMTRY CONT: CPT

## 2017-12-04 PROCEDURE — 87081 CULTURE SCREEN ONLY: CPT | Performed by: PHYSICIAN ASSISTANT

## 2017-12-04 PROCEDURE — 94640 AIRWAY INHALATION TREATMENT: CPT

## 2017-12-04 PROCEDURE — 87449 NOS EACH ORGANISM AG IA: CPT | Performed by: NURSE PRACTITIONER

## 2017-12-04 PROCEDURE — 85027 COMPLETE CBC AUTOMATED: CPT | Performed by: PHYSICIAN ASSISTANT

## 2017-12-04 RX ORDER — HYDRALAZINE HYDROCHLORIDE 20 MG/ML
5 INJECTION INTRAMUSCULAR; INTRAVENOUS EVERY 8 HOURS PRN
Status: DISCONTINUED | OUTPATIENT
Start: 2017-12-04 | End: 2017-12-07 | Stop reason: HOSPADM

## 2017-12-04 RX ADMIN — METHYLPREDNISOLONE SODIUM SUCCINATE 40 MG: 40 INJECTION, POWDER, FOR SOLUTION INTRAMUSCULAR; INTRAVENOUS at 09:13

## 2017-12-04 RX ADMIN — LEVOTHYROXINE SODIUM 50 MCG: 50 TABLET ORAL at 09:13

## 2017-12-04 RX ADMIN — PRAVASTATIN SODIUM 40 MG: 40 TABLET ORAL at 16:50

## 2017-12-04 RX ADMIN — Medication 1 TABLET: at 14:05

## 2017-12-04 RX ADMIN — CEFEPIME HYDROCHLORIDE 1000 MG: 1 INJECTION, SOLUTION INTRAVENOUS at 16:51

## 2017-12-04 RX ADMIN — Medication 1 TABLET: at 16:50

## 2017-12-04 RX ADMIN — LORATADINE 10 MG: 10 TABLET ORAL at 09:13

## 2017-12-04 RX ADMIN — TEMAZEPAM 15 MG: 15 CAPSULE ORAL at 22:48

## 2017-12-04 RX ADMIN — IPRATROPIUM BROMIDE AND ALBUTEROL SULFATE 3 ML: .5; 3 SOLUTION RESPIRATORY (INHALATION) at 20:58

## 2017-12-04 RX ADMIN — CEFEPIME HYDROCHLORIDE 1000 MG: 1 INJECTION, SOLUTION INTRAVENOUS at 04:48

## 2017-12-04 RX ADMIN — HEPARIN SODIUM 5000 UNITS: 5000 INJECTION, SOLUTION INTRAVENOUS; SUBCUTANEOUS at 14:05

## 2017-12-04 RX ADMIN — PANTOPRAZOLE SODIUM 40 MG: 40 TABLET, DELAYED RELEASE ORAL at 16:50

## 2017-12-04 RX ADMIN — METHYLPREDNISOLONE SODIUM SUCCINATE 40 MG: 40 INJECTION, POWDER, FOR SOLUTION INTRAMUSCULAR; INTRAVENOUS at 22:00

## 2017-12-04 RX ADMIN — HEPARIN SODIUM 5000 UNITS: 5000 INJECTION, SOLUTION INTRAVENOUS; SUBCUTANEOUS at 22:48

## 2017-12-04 RX ADMIN — PANTOPRAZOLE SODIUM 40 MG: 40 TABLET, DELAYED RELEASE ORAL at 06:21

## 2017-12-04 RX ADMIN — HEPARIN SODIUM 5000 UNITS: 5000 INJECTION, SOLUTION INTRAVENOUS; SUBCUTANEOUS at 06:21

## 2017-12-04 RX ADMIN — AMLODIPINE BESYLATE 5 MG: 5 TABLET ORAL at 09:13

## 2017-12-04 RX ADMIN — ASPIRIN 81 MG: 81 TABLET, COATED ORAL at 09:13

## 2017-12-04 NOTE — CONSULTS
Consultation - Pulmonary Medicine   Yousif Vo 67 y o  female MRN: [id number]  Unit/Bed#: -01 Encounter: [id number]      Assessment:  Acute hypoxemic respiratory insufficiency  Shortness of breath  Reactive airway disease with exacerbation  Left lower lobe pneumonia   Leukocytosis possibly secondary to the pneumonia  Plan:   Continue with oxygen by nasal cannula to maintain oxygen saturation greater than 91%  Continue with nebulizer treatments with levalbuterol and ipratropium  Left lower lobe pneumonia, given recent hospital admission less than 4 weeks ago, would treat her with vancomycin and cefepime, deescalate antibiotics dc  vancomycin if MRSA cultures are negative  Follow-up sputum Gram stain and cultures  Urine streptococcal and Legionella antigen negative  Outpatient pulmonary follow-up for PFTs as well as repeat imaging for resolution of the pneumonia  Assessment for home oxygen need at time of discharge  History of Present Illness   Physician Requesting Consult: Bebo Thomas DO  Reason for Consult / Principal Problem:  Shortness of breath, Pneumonia  Hx and PE limited by:  None  HPI: Yousif Vo is a 67y o  year old female who has never smoked, with history of hypertension and coronary artery disease, recently admitted into the hospital less than 4 weeks ago at [facility] for chest pain, also had shortness of breath thought to be possibly reactive airway disease she states she was discharged home on nebulizer treatments which she states has been using it once or twice a day  In spite of using the nebulizer twice a day she states she has been having more cough and wheezing that made her come into the ER yesterday, she does not complain of any fevers although she had cough yellow productive sputum no hemoptysis, she does have a sick contact she states her sister has been sick with pneumonia and has been admitted into the hospital 3 days ago    At baseline she is not on any oxygen at home she states she is able to walk around in the house do her daily activities and chores      Inpatient consult to Pulmonology  Consult performed by: Mart Lyman ordered by: Jean Pierre Turcios          Review of Systems    Historical Information   Past Medical History:   Diagnosis Date    Arthritis     Cardiac disease     "leaky valve"    Coronary artery disease     Disease of thyroid gland     Hypertension     Murmur, cardiac      Past Surgical History:   Procedure Laterality Date    CARPAL TUNNEL RELEASE      CHOLECYSTECTOMY      JOINT REPLACEMENT      b/l knee     JOINT REPLACEMENT  2007    left hip     Social History   History   Alcohol Use No     History   Drug Use No     History   Smoking Status    Never Smoker   Smokeless Tobacco    Never Used       Family History: non-contributory    Meds/Allergies   current meds:   Current Facility-Administered Medications   Medication Dose Route Frequency    acetaminophen (TYLENOL) tablet 650 mg  650 mg Oral Q6H PRN    amLODIPine (NORVASC) tablet 5 mg  5 mg Oral Daily    aspirin (ECOTRIN LOW STRENGTH) EC tablet 81 mg  81 mg Oral Daily    b complex vitamins capsule 1 capsule  1 capsule Oral Daily    calcium carbonate (OYSTER SHELL,OSCAL) 500 mg tablet 1 tablet  1 tablet Oral BID With Meals    cefepime (MAXIPIME) IVPB (premix) 1,000 mg  1,000 mg Intravenous Q12H    heparin (porcine) subcutaneous injection 5,000 Units  5,000 Units Subcutaneous Q8H [address]    hydrALAZINE (APRESOLINE) injection 5 mg  5 mg Intravenous Q8H PRN    ipratropium-albuterol (DUO-NEB) 0 5-2 5 mg/mL inhalation solution 3 mL  3 mL Nebulization Q4H PRN    levothyroxine tablet 50 mcg  50 mcg Oral Daily    loratadine (CLARITIN) tablet 10 mg  10 mg Oral Daily    methylPREDNISolone sodium succinate (Solu-MEDROL) injection 40 mg  40 mg Intravenous Q12H [address]    ondansetron (ZOFRAN) injection 4 mg  4 mg Intravenous Q6H PRN    pantoprazole (PROTONIX) EC tablet 40 mg  40 mg Oral BID AC    pneumococcal 23-valent polysaccharide vaccine (PNEUMOVAX-23) injection 0 5 mL  0 5 mL Subcutaneous Prior to discharge    pravastatin (PRAVACHOL) tablet 40 mg  40 mg Oral Daily With Dinner    temazepam (RESTORIL) capsule 15 mg  15 mg Oral HS PRN       Allergies   Allergen Reactions    Latex Rash    Neosporin [Neomycin-Bacitracin Zn-Polymyx] Rash       Objective   Vitals: Blood pressure (!) 172/72, pulse 68, temperature 97 6 °F (36 4 °C), temperature source Oral, resp  rate 20, height 4' 7" (1 397 m), weight 102 kg (225 lb 1 4 oz), SpO2 96 %  ,Body mass index is 52 32 kg/m²  Intake/Output Summary (Last 24 hours) at 12/04/17 1818  Last data filed at 12/04/17 0901   Gross per 24 hour   Intake              860 ml   Output              425 ml   Net              435 ml     Invasive Devices     Peripheral Intravenous Line            Peripheral IV 12/02/17 Right Antecubital 2 days                Physical Exam   Not in any acute respiratory distress  Eyes anicteric sclera, conjunctivae is clear  ENT nares is patent, no evidence of any discharge  Lungs diminished breath sounds bilaterally occasional expiratory rhonchi  Heart first and second heart sounds are heard no murmur or gallop is heard  Extremities bilateral trace pedal edema  CNS awake alert oriented x3  Lab Results:   CBC:   Lab Results   Component Value Date    WBC 14 04 (H) 12/04/2017    HGB 11 7 12/04/2017    HCT 38 8 12/04/2017    MCV 83 12/04/2017     12/04/2017    MCH 24 9 (L) 12/04/2017    MCHC 30 2 (L) 12/04/2017    RDW 16 7 (H) 12/04/2017    MPV 10 2 12/04/2017     Imaging Studies: I have personally reviewed pertinent films in PACS  EKG, Pathology, and Other Studies: I have personally reviewed pertinent reports      VTE Prophylaxis: Sequential compression device Caffie Patron)     Code Status: Level 1 - Full Code  Advance Directive and Living Will:      Power of :    POLST:

## 2017-12-04 NOTE — ASSESSMENT & PLAN NOTE
· TSH low, likely secondary to acute infection   · Continue levothyroxine at current dose of 50 mcg daily   · Pt to follow up with TSH as an outpatient

## 2017-12-04 NOTE — SOCIAL WORK
CM met with patient at bedside patient states she has not decided whether she wanted [place] [id number] services at home as yet  Patient wants to wait towards discharge date to make a decision

## 2017-12-04 NOTE — ASSESSMENT & PLAN NOTE
· POA with shortness of breath, productive cough and hypoxia   · Pt was treated for acute bronchitis as an outpatient with azithromycin and reported finishing the course  · Flu panel and strep pneumo urine antigen negative  · Blood cultures and MRSA pending  · O2 96% on nasal cannula, conversational dyspnea on exam   · Afebrile, no leukocytosis   · Continue IV solu-medrol, PRN nebs, mucinex, incentive spirometry, IV cefepime and O2 as needed, spoke to attending, cefepime being used due to patient with hospitalization for 1 day in October and finished azithromycin without relief as an outpatient  · Consult pulm, appreciate input  · Pt was to follow up with pulmonology for PFTs as an outpatient but did not make her appointment due to being in the hospital  · CXR 12/3 - left lower lobe consolidation consistent with pneumonia

## 2017-12-04 NOTE — PROGRESS NOTES
Progress Note - Cristino Kimball 67 y o  female MRN: [id number]    Unit/Bed#: -01 Encounter: [id number]       DOS: 11/4/2017      * Community acquired pneumonia of left lower lobe of lung ([address] )   Assessment & Plan    · POA with shortness of breath, productive cough and hypoxia   · Pt was treated for acute bronchitis as an outpatient with azithromycin and reported finishing the course  · Flu panel and strep pneumo urine antigen negative  · Blood cultures and MRSA pending  · O2 96% on nasal cannula, conversational dyspnea on exam   · Afebrile, no leukocytosis   · Possibly gram negative pneumonia due to patient with hospitalization for 1 day in October and finished azithromycin without relief as an outpatient, however no sputum culture to confirm, continue IV solu-medrol, PRN nebs, mucinex, incentive spirometry, IV cefepime and O2 as needed   · Consult pulm, appreciate input  · Pt was to follow up with pulmonology for PFTs as an outpatient but did not make her appointment due to being in the hospital  · CXR 12/3 - left lower lobe consolidation consistent with pneumonia   · Most recent glucose 154, likely secondary to IV steroids, continue to monitor        Hyperlipidemia   Assessment & Plan    · Continue pravastatin         Hypertension   Assessment & Plan    · BP have been acceptable  Most recent /72  · Continue Norvasc  Add prn hydralazine  Continue to monitor           Hypothyroid   Assessment & Plan    · TSH low, likely secondary to acute infection   · Continue levothyroxine at current dose of 50 mcg daily   · Pt to follow up with TSH as an outpatient    · Pt with morbid obesity, seen by nutrition, encourage healthy weight loss, currently on regular diet        GERD (gastroesophageal reflux disease)   Assessment & Plan    · Continue protonix 40 mg BID            VTE Pharmacologic Prophylaxis:   Pharmacologic: Heparin  Mechanical VTE Prophylaxis in Place: No    Patient Centered Rounds: I have evaluated patient without nursing staff present due to speaking with nurse outside patient's room     Discussions with Specialists or Other Care Team Provider: Discussed with SLIM attending    Education and Discussions with Family / Patient: Discussed plan with patient and when asked to update family members patient politely declined  Time Spent for Care: 30 minutes  More than 50% of total time spent on counseling and coordination of care as described above  Current Length of Stay: 1 day(s)    Current Patient Status: Inpatient   Certification Statement: The patient will continue to require additional inpatient hospital stay due to IV antibiotics and pulm recommendations     Discharge Plan: Pending symptomatic improvement and continuation of IV antibiotics     Code Status: Level 1 - Full Code      Subjective:   Patient is a 67year old female who presented with shortness of breath and cough  She reports that cough was productive and was bringing up yellow mucus  She reported going to her family doctor on 11/26 and was given azithromycin for which she finished the course of antibiotics without relief  She does not have a history of asthma and denies smoking history  She reports that she lives with her sister and helps to take care of her  Her sister is currently in the hospital as well for pneumonia per patient  She reports that she was hospitalized for a short period of time in October and was supposed to follow up with pulm as an outpatient but was unable to make it to that appointment because she is here  She follows up with cardiology as an outpatient  Currently she feels as though her cough as gotten better but still has dyspnea on exertion and shortness of breath at rest  She currently denies chest pain, lightheadedness/dizziness, abdominal pain, nausea, vomiting, diarrhea and urinary symptoms  She reports that she does not use oxygen at home and uses a walker to get around her home   She does not have any other complaints at this time  Objective:     Vitals:   Temp (24hrs), Av 8 °F (36 6 °C), Min:97 6 °F (36 4 °C), Max:98 2 °F (36 8 °C)    HR:  [68-72] 68  Resp:  [20] 20  BP: (132-187)/(59-76) 172/72  SpO2:  [93 %-96 %] 96 %  Body mass index is 52 32 kg/m²  Input and Output Summary (last 24 hours): Intake/Output Summary (Last 24 hours) at 17 1323  Last data filed at 17 0901   Gross per 24 hour   Intake              860 ml   Output              425 ml   Net              435 ml       Physical Exam:     Physical Exam   Constitutional: She is oriented to person, place, and time  She appears well-developed  No distress  Pt is having mild conversational dyspnea on exam  Reports she gets very short of breath after getting up to use the restroom  She is in no acute distress  Nasal cannula in place  HENT:   Head: Normocephalic and atraumatic  Eyes: Conjunctivae are normal  No scleral icterus  Neck: Neck supple  Cardiovascular: Normal rate, regular rhythm and intact distal pulses  Murmur heard  Pulmonary/Chest: No respiratory distress  Scattered rhonchi of the left lower lobe  Decreased breath sounds bilaterally   Abdominal: Soft  Bowel sounds are normal  She exhibits no distension  There is no tenderness  There is no rebound  Musculoskeletal: She exhibits no edema  Lymphadenopathy:     She has no cervical adenopathy  Neurological: She is alert and oriented to person, place, and time  Skin: Skin is warm and dry  She is not diaphoretic  No erythema  Psychiatric: She has a normal mood and affect  Vitals reviewed        Additional Data:     Labs:      Results from last 7 days  Lab Units 17  1117   WBC Thousand/uL 9 04 8 99   HEMOGLOBIN g/dL 11 5 11 7   HEMATOCRIT % 38 0 37 7   PLATELETS Thousands/uL 356 337   NEUTROS PCT %  --  68   LYMPHS PCT %  --  15   MONOS PCT %  --  8   EOS PCT %  --  8*       Results from last 7 days  Lab Units 173 12/02/17  1117   SODIUM mmol/L 142 143   POTASSIUM mmol/L 4 6 3 7   CHLORIDE mmol/L 107 107   CO2 mmol/L 30 30   BUN mg/dL 18 12   CREATININE mg/dL 0 75 0 80   CALCIUM mg/dL 9 7 9 5   TOTAL PROTEIN g/dL  --  7 2   BILIRUBIN TOTAL mg/dL  --  0 40   ALK PHOS U/L  --  76   ALT U/L  --  22   AST U/L  --  23   GLUCOSE RANDOM mg/dL 154* 143*       Results from last 7 days  Lab Units 12/02/17  1117   INR  0 98       * I Have Reviewed All Lab Data Listed Above  * Additional Pertinent Lab Tests Reviewed: Vanessa 65 Admission Reviewed    Imaging:    Imaging Reports Reviewed Today Include: CXR x2  Imaging Personally Reviewed by Myself Includes:  None    Recent Cultures (last 7 days):       Results from last 7 days  Lab Units 12/04/17  0438 12/02/17  1117   INFLUENZA A PCR   --  None Detected   INFLUENZA B PCR   --  None Detected   RSV PCR   --  None Detected   LEGIONELLA URINARY ANTIGEN  Negative  --        Last 24 Hours Medication List:     amLODIPine 5 mg Oral Daily   aspirin 81 mg Oral Daily   b complex vitamins 1 capsule Oral Daily   calcium carbonate 1 tablet Oral BID With Meals   cefepime 1,000 mg Intravenous Q12H   heparin (porcine) 5,000 Units Subcutaneous Q8H [facility] & half-way   levothyroxine 50 mcg Oral Daily   loratadine 10 mg Oral Daily   methylPREDNISolone sodium succinate 40 mg Intravenous Q12H HUMPHREY   pantoprazole 40 mg Oral BID AC   pravastatin 40 mg Oral Daily With Dinner        Today, Patient Was Seen By: Yaya Avalos PA-C    ** Please Note: Dictation voice to text software may have been used in the creation of this document   **

## 2017-12-04 NOTE — MALNUTRITION/BMI
This medical record reflects one or more clinical indicators suggestive of morbid obesity  BMI Findings:  50-59 9    Morbid obesity related to energy intake greater than energy output over time as evidenced by current BMI    Body mass index is 52 32 kg/m²  See Nutrition note dated 12/4/17 for additional details  Completed nutrition assessment is viewable in the nutrition documentation

## 2017-12-04 NOTE — ASSESSMENT & PLAN NOTE
· BP have been acceptable  Most recent /72  · Continue Norvasc  Add prn hydralazine  Continue to monitor

## 2017-12-05 LAB
ERYTHROCYTE [DISTWIDTH] IN BLOOD BY AUTOMATED COUNT: 16.5 % (ref 11.6–15.1)
HCT VFR BLD AUTO: 38.3 % (ref 34.8–46.1)
HGB BLD-MCNC: 11.4 G/DL (ref 11.5–15.4)
MCH RBC QN AUTO: 24.6 PG (ref 26.8–34.3)
MCHC RBC AUTO-ENTMCNC: 29.8 G/DL (ref 31.4–37.4)
MCV RBC AUTO: 83 FL (ref 82–98)
MRSA NOSE QL CULT: NORMAL
PLATELET # BLD AUTO: 350 THOUSANDS/UL (ref 149–390)
PMV BLD AUTO: 9.7 FL (ref 8.9–12.7)
RBC # BLD AUTO: 4.64 MILLION/UL (ref 3.81–5.12)
WBC # BLD AUTO: 10.93 THOUSAND/UL (ref 4.31–10.16)

## 2017-12-05 PROCEDURE — 94762 N-INVAS EAR/PLS OXIMTRY CONT: CPT

## 2017-12-05 PROCEDURE — G8979 MOBILITY GOAL STATUS: HCPCS

## 2017-12-05 PROCEDURE — 97163 PT EVAL HIGH COMPLEX 45 MIN: CPT

## 2017-12-05 PROCEDURE — 94668 MNPJ CHEST WALL SBSQ: CPT

## 2017-12-05 PROCEDURE — G8978 MOBILITY CURRENT STATUS: HCPCS

## 2017-12-05 PROCEDURE — 85027 COMPLETE CBC AUTOMATED: CPT | Performed by: PHYSICIAN ASSISTANT

## 2017-12-05 RX ORDER — METHYLPREDNISOLONE SODIUM SUCCINATE 40 MG/ML
40 INJECTION, POWDER, LYOPHILIZED, FOR SOLUTION INTRAMUSCULAR; INTRAVENOUS DAILY
Status: COMPLETED | OUTPATIENT
Start: 2017-12-06 | End: 2017-12-06

## 2017-12-05 RX ADMIN — TEMAZEPAM 15 MG: 15 CAPSULE ORAL at 21:38

## 2017-12-05 RX ADMIN — ASPIRIN 81 MG: 81 TABLET, COATED ORAL at 10:13

## 2017-12-05 RX ADMIN — Medication 1 TABLET: at 17:22

## 2017-12-05 RX ADMIN — Medication 1 TABLET: at 12:26

## 2017-12-05 RX ADMIN — HEPARIN SODIUM 5000 UNITS: 5000 INJECTION, SOLUTION INTRAVENOUS; SUBCUTANEOUS at 13:56

## 2017-12-05 RX ADMIN — METHYLPREDNISOLONE SODIUM SUCCINATE 40 MG: 40 INJECTION, POWDER, FOR SOLUTION INTRAMUSCULAR; INTRAVENOUS at 21:24

## 2017-12-05 RX ADMIN — PANTOPRAZOLE SODIUM 40 MG: 40 TABLET, DELAYED RELEASE ORAL at 06:12

## 2017-12-05 RX ADMIN — LORATADINE 10 MG: 10 TABLET ORAL at 10:13

## 2017-12-05 RX ADMIN — CEFEPIME HYDROCHLORIDE 1000 MG: 1 INJECTION, SOLUTION INTRAVENOUS at 06:00

## 2017-12-05 RX ADMIN — AMLODIPINE BESYLATE 5 MG: 5 TABLET ORAL at 10:13

## 2017-12-05 RX ADMIN — LEVOTHYROXINE SODIUM 50 MCG: 50 TABLET ORAL at 10:14

## 2017-12-05 RX ADMIN — PANTOPRAZOLE SODIUM 40 MG: 40 TABLET, DELAYED RELEASE ORAL at 17:21

## 2017-12-05 RX ADMIN — PRAVASTATIN SODIUM 40 MG: 40 TABLET ORAL at 17:22

## 2017-12-05 RX ADMIN — HEPARIN SODIUM 5000 UNITS: 5000 INJECTION, SOLUTION INTRAVENOUS; SUBCUTANEOUS at 06:12

## 2017-12-05 RX ADMIN — HEPARIN SODIUM 5000 UNITS: 5000 INJECTION, SOLUTION INTRAVENOUS; SUBCUTANEOUS at 21:37

## 2017-12-05 RX ADMIN — CEFTRIAXONE 1000 MG: 1 INJECTION, SOLUTION INTRAVENOUS at 13:56

## 2017-12-05 RX ADMIN — METHYLPREDNISOLONE SODIUM SUCCINATE 40 MG: 40 INJECTION, POWDER, FOR SOLUTION INTRAMUSCULAR; INTRAVENOUS at 10:13

## 2017-12-05 NOTE — PHYSICAL THERAPY NOTE
Physical Therapy Evaluation    Patient's Name: Jair Louis    Admitting Diagnosis  Wheezing [R06 2]  Bronchitis [J40]  Hypoxia [R09 02]  Trouble breathing [R06 89]    Problem List  Patient Active Problem List   Diagnosis    GERD (gastroesophageal reflux disease)    Hypothyroid    Oxygen dependent    Hypertension    Community acquired pneumonia of left lower lobe of lung [address] [address]    Hyperlipidemia       Past Medical History  Past Medical History:   Diagnosis Date    Arthritis     Cardiac disease     "leaky valve"    Coronary artery disease     Disease of thyroid gland     Hypertension     Murmur, cardiac        Past Surgical History  Past Surgical History:   Procedure Laterality Date    CARPAL TUNNEL RELEASE      CHOLECYSTECTOMY      JOINT REPLACEMENT      b/l knee     JOINT REPLACEMENT  2007    left hip        12/05/17 1307   Note Type   Note type Eval/Treat   Pain Assessment   Pain Assessment No/denies pain   Pain Score No Pain   Home Living   Type of [address] [place] [address] One level;Performs ADLs on one level; Able to live on main level with bedroom/bathroom; Ramped entrance   Bathroom Shower/Tub Tub/shower unit   Bathroom Toilet Raised   Bathroom Equipment Shower chair   Bathroom Accessibility Accessible;Accessible via walker   [address] [facility]; Other (Comment);Cane;Life alert  (primarily uses rollator in home)   Prior Function   Level of [place] Independent with ADLs and functional mobility   Lives With Family  (sister)   Receives Help From Family   ADL Assistance Independent   IADLs Independent   Falls in the last 6 months 0   Comments pt drives   Restrictions/Precautions   Weight Bearing Precautions Per Order No   Other Precautions Contact/isolation;O2;Fall Risk   General   Family/Caregiver Present No   Cognition   Overall Cognitive Status WFL   Arousal/Participation Alert   Orientation Level Oriented X4   Memory Within functional limits   Following Commands Follows all commands and directions without difficulty   RUE Assessment   RUE Assessment WFL   LUE Assessment   LUE Assessment WFL   RLE Assessment   RLE Assessment WFL   Strength RLE   R Hip Flexion 4-/5   R Knee Extension 4/5   LLE Assessment   LLE Assessment WFL   Strength LLE   L Hip Flexion 4-/5   L Knee Extension 4/5   Coordination   Movements are Fluid and Coordinated 1   Sensation WFL  (pt denies any numbness/tingling)   Light Touch   RLE Light Touch Grossly intact   LLE Light Touch Grossly intact   Bed Mobility   Rolling R Unable to assess  (pt received OOB in recliner)   Transfers   Sit to Stand 5  Supervision   Additional items Assist x 1; Armrests; Increased time required   Stand to Sit 5  Supervision   Additional items Assist x 1; Armrests; Increased time required   Toilet transfer 5  Supervision   Additional items Assist x 1; Increased time required;Verbal cues;Standard toilet   Ambulation/Elevation   Gait pattern Decreased foot clearance;Shuffling; Short stride; Excessively slow   Gait Assistance 5  Supervision   Additional items Assist x 1;Verbal cues; Tactile cues   Assistive Device Rolling walker  (pt's own)   Distance 20' x2; A required for O2 tubing management   Balance   Static Sitting Good   Dynamic Sitting Fair +   Static Standing Fair +   Dynamic Standing Fair   Ambulatory Fair   Endurance Deficit   Endurance Deficit Yes   Endurance Deficit Description increased SOB/GALINDO noted w/ activity at this time   Activity Tolerance   Activity Tolerance Patient limited by fatigue   Medical Staff Made Aware pt w/ up and OOB as tolerated order   Nurse Made Aware Yes, RN Ros verbalized pt appropriate for PT session, made aware of outcomes   Assessment   Prognosis Good   Problem List Decreased strength;Decreased endurance; Impaired balance;Decreased mobility   Assessment Pt is 67 y o  female seen for PT evaluation on 12/5/2017 s/p admit to [facility] on 12/2/2017 w/ Community acquired pneumonia of left lower lobe of lung ([facility] [address] )   PT consulted to assess pt's functional mobility and d/c needs  Order placed for PT eval and tx, w/ up and OOB as tolerated order  Comorbidities affecting pt's physical performance at time of assessment include: HTN, hypothyroidism, acute bronchitis, CAD  PTA, pt was independent w/ all functional mobility w/ RW vs  rollator, ambulates community distances and elevations, lives w/ sister in 1 level home and has ramp to enter home; pt report she is independent w/ ADLs at baseline  Personal factors affecting pt at time of IE include: ambulating w/ assistive device, unable to perform dynamic tasks in community and limited insight into impairments  Please find objective findings from PT assessment regarding body systems outlined above with impairments and limitations including weakness, impaired balance, decreased endurance, decreased activity tolerance, fall risk and SOB upon exertion, as well as mobility assessment (need for supervision level of assist w/ all phases of mobility when usually ambulating independently and tolerance to only 20 feet of ambulation)  The following objective measures performed on IE also reveal limitations: Barthel Index: 60/100 and Modified [place]: 3 (moderate disability)  Pt to benefit from continued PT tx to address deficits as defined above and maximize level of functional independent mobility and consistency  From PT/mobility standpoint, recommendation at time of d/c would be Home PT pending progress in order to facilitate return to PLOF     Goals   Patient Goals "to return home"   [facility] Expiration Date 12/15/17   Short Term Goal #1 In 7-10 days: Increase bilateral LE strength 1/2 grade to facilitate independent mobility, Perform all bed mobility tasks independently to decrease caregiver burden, Perform all transfers modified independent to improve independence, Ambulate > 100 ft  with least restrictive assistive device modified independent w/o LOB and w/ normalized gait pattern 100% of the time, Increase all balance 1/2 grade to decrease risk for falls, Complete exercise program independently and Tolerate 4 hr OOB to faciliate upright tolerance   Treatment Day 0   Plan   Treatment/Interventions Functional transfer training;LE strengthening/ROM; Therapeutic exercise; Endurance training;Patient/family training;Equipment eval/education;Gait training;Spoke to nursing   PT Frequency (3-5x/wk)   Recommendation   Recommendation Home PT; Home with family support   Equipment Recommended Walker   PT - OK to Discharge Yes  (when medically cleared)   Modified Yancy Scale   Modified Shelly Scale 3   Barthel Index   Feeding 10   Bathing 0   Grooming Score 5   Dressing Score 5   Bladder Score 10   Bowels Score 10   Toilet Use Score 10   Transfers (Bed/Chair) Score 10   Mobility (Level Surface) Score 0   Stairs Score 0   Barthel Index Score 60         Juan Trejo, PT

## 2017-12-05 NOTE — ASSESSMENT & PLAN NOTE
· Flu panel and strep pneumo/legionella urine antigen negative  · Blood cultures and MRSA negative   · O2 97% on nasal cannula, continue to titrate oxygen down and will need ambulatory pulse ox prior to discharge to assess need for O2  · Afebrile, leukocytosis improving to 10 today   · Encourage incentive spirometry and monitoring peak flow   · Pulm following, continue IV solu-medrol with decrease in dose in AM to 40 mg qd, PRN nebs, mucinex, antibiotics changed from cefepime to IV ceftriaxone but will switch to PO cefdinir at time of discharge and O2 as needed  · Pt was to follow up with pulmonology for PFTs as an outpatient and sleep study for JANICE  · CXR 12/3 - left lower lobe consolidation consistent with pneumonia

## 2017-12-05 NOTE — PLAN OF CARE
Problem: PHYSICAL THERAPY ADULT  Goal: Performs mobility at highest level of function for planned discharge setting  See evaluation for individualized goals  Treatment/Interventions: Functional transfer training, LE strengthening/ROM, Therapeutic exercise, Endurance training, Patient/family training, Equipment eval/education, Gait training, Spoke to nursing  Equipment Recommended: Waleska Ewing       See flowsheet documentation for full assessment, interventions and recommendations  Prognosis: Good  Problem List: Decreased strength, Decreased endurance, Impaired balance, Decreased mobility  Assessment: Pt is 67 y o  female seen for PT evaluation on 12/5/2017 s/p admit to [facility] on 12/2/2017 w/ Community acquired pneumonia of left lower lobe of lung ([facility] [address] )  PT consulted to assess pt's functional mobility and d/c needs  Order placed for PT eval and tx, w/ up and OOB as tolerated order  Comorbidities affecting pt's physical performance at time of assessment include: HTN, hypothyroidism, acute bronchitis, CAD  PTA, pt was independent w/ all functional mobility w/ RW vs  rollator, ambulates community distances and elevations, lives w/ sister in 1 level home and has ramp to enter home; pt report she is independent w/ ADLs at baseline  Personal factors affecting pt at time of IE include: ambulating w/ assistive device, unable to perform dynamic tasks in community and limited insight into impairments  Please find objective findings from PT assessment regarding body systems outlined above with impairments and limitations including weakness, impaired balance, decreased endurance, decreased activity tolerance, fall risk and SOB upon exertion, as well as mobility assessment (need for supervision level of assist w/ all phases of mobility when usually ambulating independently and tolerance to only 20 feet of ambulation)   The following objective measures performed on IE also reveal limitations: Barthel Index: 60/100 and Modified Yancy: 3 (moderate disability)  Pt to benefit from continued PT tx to address deficits as defined above and maximize level of functional independent mobility and consistency  From PT/mobility standpoint, recommendation at time of d/c would be Home PT pending progress in order to facilitate return to PLOF  Recommendation: Home PT, Home with family support     PT - OK to Discharge: Yes (when medically cleared)    See flowsheet documentation for full assessment

## 2017-12-05 NOTE — PROGRESS NOTES
Progress Note - Pulmonary   Cat Standard 67 y o  female MRN: [id number]  Unit/Bed#: -01 Encounter: [id number]    Assessment:  1  Acute hypoxic respiratory insufficiency  2  Shortness of breath  3   Reactive airway disease with exacerbation  4  Left lower lobe pneumonia  5  Leukocytosis, trending down    Plan:  1    Acute hypoxic respiratory insufficiency secondary to Reactive airway disease with exacerbation and Left lower lobe pneumonia with baseline exertional shortness of breath over the last few months  - currently saturating 95% on 2 L via nasal cannula, continue to titrate to keep SpO2 greater than 90%; will need ambulatory pulse ox prior to discharge  - chest x-ray with evidence of left lower lobe pneumonia, given significant improvement in symptoms, afebrile, leukocytosis trending down; will narrow antibiotics to ceftriaxone alone; can switch to oral cefdinir upon discharge  - blood culture negative after 24 hours, MRSA normal, Legionella and strep pneumo antigens normal  - add in incentive spirometer; advised patient to use 10 times every hour  - recommend monitoring peak flows  - decrease Solu-Medrol to 40 mg q day in a m   - given persistent dyspnea on exertion over the last few months, recommend outpatient follow-up with completion of PFTs with DLCO; patient reports recently having a stress test completed in November of 2017 which was normal; echo completed 3/20/2017 with estimated peak PA pressure of 35 mmHg (mild elevation), would recommend repeat as an outpatient to assess for any worsening that may be contributing to shortness of breath; would recommend outpatient sleep study as untreated JANICE may be contributing to elevated estimated pulmonary artery pressures  - will need repeat chest x-ray in 6-8 weeks as an outpatient      Chief Complaint:   Shortness of breath    Subjective:   Patient reports she has been feeling short of breath for the last few months ever since she received anesthesia from her carpal tunnel surgery  States she feels exertional dyspnea when she walks or sometimes when she has long conversations  Patient reports this does not happen all the time  Reports significant improvement since admission in her breathing with improved productive cough as well  Objective:     Vitals: Blood pressure 163/73, pulse 62, temperature 98 1 °F (36 7 °C), temperature source Oral, resp  rate 18, height 4' 7" (1 397 m), weight 102 kg (225 lb 1 4 oz), SpO2 95 %  ,Body mass index is 52 32 kg/m²  Intake/Output Summary (Last 24 hours) at 12/05/17 1320  Last data filed at 12/05/17 0900   Gross per 24 hour   Intake              280 ml   Output              750 ml   Net             -470 ml       Invasive Devices     Peripheral Intravenous Line            Peripheral IV 12/02/17 Right Antecubital 3 days                Physical Exam: /73   Pulse 62   Temp 98 1 °F (36 7 °C) (Oral)   Resp 18   Ht 4' 7" (1 397 m)   Wt 102 kg (225 lb 1 4 oz)   SpO2 95%   BMI 52 32 kg/m²   General appearance: alert, appears stated age, cooperative and no distress  Head: Normocephalic, without obvious abnormality, atraumatic  Lungs: Diminished breath sounds bilaterally, no rhonchi  Heart: regular rate and rhythm, S1, S2 normal, no murmur, click, rub or gallop  Extremities: extremities normal, atraumatic, no cyanosis or edema  Skin: Skin color, texture, turgor normal  No rashes or lesions  Neurologic: Mental status: Alert, oriented, thought content appropriate     Labs: I have personally reviewed pertinent lab results  , CBC:   Lab Results   Component Value Date    WBC 10 93 (H) 12/05/2017    HGB 11 4 (L) 12/05/2017    HCT 38 3 12/05/2017    MCV 83 12/05/2017     12/05/2017    MCH 24 6 (L) 12/05/2017    MCHC 29 8 (L) 12/05/2017    RDW 16 5 (H) 12/05/2017    MPV 9 7 12/05/2017   , CMP:   Lab Results   Component Value Date     12/04/2017    K 4 5 12/04/2017     12/04/2017    CO2 29 12/04/2017

## 2017-12-05 NOTE — PROGRESS NOTES
Progress Note - Juanito Escamilla 67 y o  female MRN: [id number]    Unit/Bed#: -01 Encounter: [id number]       DOS: 12/5/2017      * Community acquired pneumonia of left lower lobe of lung ([address] )   Assessment & Plan    · Flu panel and strep pneumo/legionella urine antigen negative  · Blood cultures and MRSA negative   · O2 97% on nasal cannula, continue to titrate oxygen down and will need ambulatory pulse ox prior to discharge to assess need for O2  · Afebrile, leukocytosis improving to 10 today   · Encourage incentive spirometry and monitoring peak flow   · Pulm following, continue IV solu-medrol with decrease in dose in AM to 40 mg qd, PRN nebs, mucinex, antibiotics changed from cefepime to IV ceftriaxone but will switch to PO cefdinir at time of discharge and O2 as needed  · Pt was to follow up with pulmonology for PFTs as an outpatient and sleep study for JANICE  · CXR 12/3 - left lower lobe consolidation consistent with pneumonia   · Continue to monitor blood sugars, most recent 197 likely due to IV steroids        Hyperlipidemia   Assessment & Plan    · Continue pravastatin         Hypertension   Assessment & Plan    · Most recent /73  · Continue Norvasc with PRN hydralazine  Continue to monitor           Hypothyroid   Assessment & Plan    · TSH low, likely secondary to acute infection   · Continue levothyroxine at current dose of 50 mcg daily   · Pt to follow up with TSH as an outpatient          GERD (gastroesophageal reflux disease)   Assessment & Plan    · Continue protonix 40 mg BID            VTE Pharmacologic Prophylaxis:   Pharmacologic: Heparin  Mechanical VTE Prophylaxis in Place: Yes    Patient Centered Rounds: I have evaluated patient without nursing staff present due to speaking to nurse outside patient's room     Discussions with Specialists or Other Care Team Provider: Discussed with pulm, RN and CM    Education and Discussions with Family / Patient: Discussed plan with patient and when asked to update family patient politely declined  She is very concerned about her sister being in the hospital as well  She takes care 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 and does not want to be discharged after her  She expressed these concerns to the  as well  Time Spent for Care: 30 minutes  More than 50% of total time spent on counseling and coordination of care as described above  Current Length of Stay: 2 day(s)    Current Patient Status: Inpatient   Certification Statement: The patient will continue to require additional inpatient hospital stay due to IV steroids, o2 dependence, and symptomatic improvement     Discharge Plan: Pending transition to PO steroids and antibiotics upon pulm recommendations  Code Status: Level 1 - Full Code      Subjective:   Patient is a 67year old female who presented with shortness of breath and cough  She is frustrated today because she still feels as though she gets very short of breath with ambulation  Patient reports that she had just walked to the bathroom  She does admit that her cough has gotten better and there is less production with it  She feels as though she gets the most out of breath with ambulation and talking  She is concerned about her sister who is also hospitalized here and does not want to be discharged after her because she is her caretaker  Patient currently denies lightheadedness/dizziness, chest pain, abdominal pain, nausea, vomiting, diarrhea, constipation and urinary difficulties  She reported that the nurse felt as though her legs were swollen today but she does not think they look any different  She reports that she had an albuterol neb at home after she was last discharged from the hospital and had used this prior to admission without relief  She does not feel as though the neb treatments here help her shortness of breath  She has no additional complaints at this time       Objective:     Vitals:   Temp (24hrs), Av °F (36 7 °C), Min:98 °F (36 7 °C), Max:98 1 °F (36 7 °C)    HR:  [62-72] 65  Resp:  [18] 18  BP: (163-170)/(73-84) 165/73  SpO2:  [94 %-97 %] 97 %  Body mass index is 52 32 kg/m²  Input and Output Summary (last 24 hours): Intake/Output Summary (Last 24 hours) at 12/05/17 1718  Last data filed at 12/05/17 1607   Gross per 24 hour   Intake              580 ml   Output             1950 ml   Net            -1370 ml       Physical Exam:     Physical Exam   Constitutional: She appears well-developed and well-nourished  No distress  Pt is in no acute distress sitting in her chair speaking on the phone with mild dyspnea   HENT:   Head: Normocephalic and atraumatic  Eyes: Conjunctivae are normal  No scleral icterus  Cardiovascular: Normal rate, regular rhythm and intact distal pulses  Murmur heard  Pulmonary/Chest: Effort normal  No respiratory distress  She has wheezes  Expiratory wheezing bilaterally without evidence of rhonchi   Abdominal: Soft  Bowel sounds are normal  She exhibits no distension  There is no tenderness  There is no rebound  Musculoskeletal: She exhibits no edema  Neurological: She is alert  Skin: Skin is warm and dry  She is not diaphoretic  No erythema  Psychiatric: She has a normal mood and affect  Vitals reviewed  Additional Data:     Labs:      Results from last 7 days  Lab Units 12/05/17  0546  12/02/17  1117   WBC Thousand/uL 10 93*  < > 8 99   HEMOGLOBIN g/dL 11 4*  < > 11 7   HEMATOCRIT % 38 3  < > 37 7   PLATELETS Thousands/uL 350  < > 337   NEUTROS PCT %  --   --  68   LYMPHS PCT %  --   --  15   MONOS PCT %  --   --  8   EOS PCT %  --   --  8*   < > = values in this interval not displayed      Results from last 7 days  Lab Units 12/04/17  1502  12/02/17  1117   SODIUM mmol/L 140  < > 143   POTASSIUM mmol/L 4 5  < > 3 7   CHLORIDE mmol/L 103  < > 107   CO2 mmol/L 29  < > 30   BUN mg/dL 28*  < > 12   CREATININE mg/dL 0 76  < > 0 80   CALCIUM mg/dL 9 8  < > 9 5   TOTAL PROTEIN g/dL  --   -- 7  2   BILIRUBIN TOTAL mg/dL  --   --  0 40   ALK PHOS U/L  --   --  76   ALT U/L  --   --  22   AST U/L  --   --  23   GLUCOSE RANDOM mg/dL 197*  < > 143*   < > = values in this interval not displayed  Results from last 7 days  Lab Units 12/02/17  1117   INR  0 98       * I Have Reviewed All Lab Data Listed Above  * Additional Pertinent Lab Tests Reviewed: All Labs Within Last 24 Hours Reviewed    Imaging:    Imaging Reports Reviewed Today Include: CXR  Imaging Personally Reviewed by Myself Includes:  None    Recent Cultures (last 7 days):       Results from last 7 days  Lab Units 12/04/17  0438 12/03/17  1727 12/03/17  1726 12/02/17  1117   BLOOD CULTURE   --  No Growth at 24 hrs  No Growth at 24 hrs   --    INFLUENZA A PCR   --   --   --  None Detected   INFLUENZA B PCR   --   --   --  None Detected   RSV PCR   --   --   --  None Detected   LEGIONELLA URINARY ANTIGEN  Negative  --   --   --        Last 24 Hours Medication List:     amLODIPine 5 mg Oral Daily   aspirin 81 mg Oral Daily   b complex vitamins 1 capsule Oral Daily   calcium carbonate 1 tablet Oral BID With Meals   cefTRIAXone 1,000 mg Intravenous Q24H   heparin (porcine) 5,000 Units Subcutaneous Q8H [address]   levothyroxine 50 mcg Oral Daily   loratadine 10 mg Oral Daily   methylPREDNISolone sodium succinate 40 mg Intravenous Q12H [address]   [START ON 12/6/2017] methylPREDNISolone sodium succinate 40 mg Intravenous Daily   pantoprazole 40 mg Oral BID AC   pravastatin 40 mg Oral Daily With Dinner        Today, Patient Was Seen By: Graham Hylton PA-C    ** Please Note: Dictation voice to text software may have been used in the creation of this document   **

## 2017-12-06 ENCOUNTER — APPOINTMENT (INPATIENT)
Dept: NON INVASIVE DIAGNOSTICS | Facility: HOSPITAL | Age: 77
DRG: 178 | End: 2017-12-06
Payer: MEDICARE

## 2017-12-06 LAB
ERYTHROCYTE [DISTWIDTH] IN BLOOD BY AUTOMATED COUNT: 16.1 % (ref 11.6–15.1)
HCT VFR BLD AUTO: 39.6 % (ref 34.8–46.1)
HGB BLD-MCNC: 12.1 G/DL (ref 11.5–15.4)
MCH RBC QN AUTO: 24.9 PG (ref 26.8–34.3)
MCHC RBC AUTO-ENTMCNC: 30.6 G/DL (ref 31.4–37.4)
MCV RBC AUTO: 82 FL (ref 82–98)
PLATELET # BLD AUTO: 368 THOUSANDS/UL (ref 149–390)
PMV BLD AUTO: 9.9 FL (ref 8.9–12.7)
RBC # BLD AUTO: 4.86 MILLION/UL (ref 3.81–5.12)
WBC # BLD AUTO: 11.05 THOUSAND/UL (ref 4.31–10.16)

## 2017-12-06 PROCEDURE — 94668 MNPJ CHEST WALL SBSQ: CPT

## 2017-12-06 PROCEDURE — 94762 N-INVAS EAR/PLS OXIMTRY CONT: CPT

## 2017-12-06 PROCEDURE — 93306 TTE W/DOPPLER COMPLETE: CPT

## 2017-12-06 PROCEDURE — 85027 COMPLETE CBC AUTOMATED: CPT | Performed by: PHYSICIAN ASSISTANT

## 2017-12-06 RX ORDER — ALBUTEROL SULFATE 2.5 MG/3ML
2.5 SOLUTION RESPIRATORY (INHALATION) EVERY 4 HOURS PRN
Status: DISCONTINUED | OUTPATIENT
Start: 2017-12-06 | End: 2017-12-07 | Stop reason: HOSPADM

## 2017-12-06 RX ORDER — AMLODIPINE BESYLATE 10 MG/1
10 TABLET ORAL DAILY
Status: DISCONTINUED | OUTPATIENT
Start: 2017-12-07 | End: 2017-12-07 | Stop reason: HOSPADM

## 2017-12-06 RX ADMIN — PANTOPRAZOLE SODIUM 40 MG: 40 TABLET, DELAYED RELEASE ORAL at 06:26

## 2017-12-06 RX ADMIN — PRAVASTATIN SODIUM 40 MG: 40 TABLET ORAL at 17:02

## 2017-12-06 RX ADMIN — HEPARIN SODIUM 5000 UNITS: 5000 INJECTION, SOLUTION INTRAVENOUS; SUBCUTANEOUS at 13:14

## 2017-12-06 RX ADMIN — HYDRALAZINE HYDROCHLORIDE 5 MG: 20 INJECTION INTRAMUSCULAR; INTRAVENOUS at 18:00

## 2017-12-06 RX ADMIN — Medication 1 TABLET: at 17:02

## 2017-12-06 RX ADMIN — AMLODIPINE BESYLATE 5 MG: 5 TABLET ORAL at 08:59

## 2017-12-06 RX ADMIN — Medication 1 TABLET: at 11:57

## 2017-12-06 RX ADMIN — PANTOPRAZOLE SODIUM 40 MG: 40 TABLET, DELAYED RELEASE ORAL at 17:02

## 2017-12-06 RX ADMIN — HEPARIN SODIUM 5000 UNITS: 5000 INJECTION, SOLUTION INTRAVENOUS; SUBCUTANEOUS at 22:00

## 2017-12-06 RX ADMIN — LORATADINE 10 MG: 10 TABLET ORAL at 08:59

## 2017-12-06 RX ADMIN — CEFTRIAXONE 1000 MG: 1 INJECTION, SOLUTION INTRAVENOUS at 13:14

## 2017-12-06 RX ADMIN — METHYLPREDNISOLONE SODIUM SUCCINATE 40 MG: 40 INJECTION, POWDER, FOR SOLUTION INTRAMUSCULAR; INTRAVENOUS at 09:04

## 2017-12-06 RX ADMIN — HEPARIN SODIUM 5000 UNITS: 5000 INJECTION, SOLUTION INTRAVENOUS; SUBCUTANEOUS at 06:26

## 2017-12-06 RX ADMIN — TEMAZEPAM 15 MG: 15 CAPSULE ORAL at 23:11

## 2017-12-06 RX ADMIN — HYDRALAZINE HYDROCHLORIDE 5 MG: 20 INJECTION INTRAMUSCULAR; INTRAVENOUS at 02:58

## 2017-12-06 RX ADMIN — ASPIRIN 81 MG: 81 TABLET, COATED ORAL at 08:59

## 2017-12-06 RX ADMIN — LEVOTHYROXINE SODIUM 50 MCG: 50 TABLET ORAL at 06:32

## 2017-12-06 NOTE — PROGRESS NOTES
Patient is complaining of being "sweaty"  John Carrier was changed  She denies any discomfort  Assisted to the bathroom and back to assisted back to bed  VS done her SBP was elevated  And she was given her prn hydralazine  She is afebrile  Will continue to assess  Call placed to provider about patients BP being elevated, and that she awoke diaphorectic,and without fever or being diabetic  and patient without any discomfort  No new orders at this time will monitor    SBP rechecked and is down to 164/73

## 2017-12-06 NOTE — PROGRESS NOTES
Patient assisted to ambulate to the bathroom with her oxygen on at 1 liter via nasal canula and her sats were in the 90's it was down to 93 ambulating  She had a slight wheeze in her left upper lung  She is doing her flutter at bedside and she  Does have a cough that is non productive   She is 95 % at rest

## 2017-12-06 NOTE — ASSESSMENT & PLAN NOTE
· Still feeling very SOB even at rest, especially when talking  Lungs clear without wheezing or rhonchi  · CXR 12/3 - left lower lobe consolidation consistent with pneumonia   · Flu panel and strep pneumo/legionella urine antigen negative  · Blood cultures negative  · O2 97% on 2L nasal cannula,   · Check ambulatory and resting room air sat  · Afebrile, WBC trended up a slight bit, 11 05 today, however this is likely a result of IV steroids  · Encourage incentive spirometry and peak flow   · Pulm following, continue IV solu-medrol 40 mg daily, PRN nebs, mucinex, IV ceftriaxone but will switch to PO cefdinir at time of discharge  · Will need to follow up with pulmonology for PFTs as an outpatient, sleep study to rule out JANICE and also a repeat CXR in 6-8 weeks

## 2017-12-06 NOTE — CASE MANAGEMENT
Continued Stay Review    Date:12/6/2017    Vital Signs: /72   Pulse 61   Temp 97 7 °F (36 5 °C) (Oral)   Resp 18   Ht 4' 7" (1 397 m)   Wt 102 kg (225 lb 1 4 oz)   SpO2 94%   BMI 52 32 kg/m²     Medications:   Scheduled Meds:   [START ON 12/7/2017] amLODIPine 10 mg Oral Daily   aspirin 81 mg Oral Daily   b complex vitamins 1 capsule Oral Daily   calcium carbonate 1 tablet Oral BID With Meals   cefTRIAXone 1,000 mg Intravenous Q24H   heparin (porcine) 5,000 Units Subcutaneous Q8H [address]   levothyroxine 50 mcg Oral Daily   loratadine 10 mg Oral Daily   pantoprazole 40 mg Oral BID AC   pravastatin 40 mg Oral Daily With Dinner   [START ON 12/7/2017] predniSONE 30 mg Oral Daily        12/6  PULMONARY     Assessment:  1   Acute hypoxic respiratory insufficiency  2   Shortness of breath  3   Reactive airway disease with exacerbation  4   Left lower lobe pneumonia  5   Leukocytosis, trending down     Plan:  1   Acute hypoxic respiratory insufficiency secondary to Reactive airway disease with exacerbation and Left lower lobe pneumonia with baseline exertional shortness of breath over the last few months  - currently saturating 95% on 2 L via nasal cannula, continue to titrate to keep SpO2 greater than 90%; patient is saturating 90-91 percent with exertion  - continue ceftriaxone alone; can switch to oral cefdinir upon discharge  - blood culture negative after 48 hours, MRSA normal, Legionella and strep pneumo antigens normal  - continue incentive spirometer; advised patient to use 10 times every hour  - recommend monitoring peak flows  - decrease Solu-Medrol to 40 mg q day in a m   - given persistent dyspnea on exertion over the last few months, recommend outpatient follow-up with completion of PFTs with DLCO; patient reports recently having a stress test completed in November of 2017 which was normal; echo completed 3/20/2017 with estimated peak PA pressure of 35 mmHg (mild elevation), will repeat echo today to assess for any worsening that may be contributing to shortness of breath; would recommend outpatient sleep study as untreated JANICE may be contributing to elevated estimated pulmonary artery pressures  - will need repeat chest x-ray in 6-8 weeks as an outpatient  - will set up patient with outpatient appointment in the next week     INTERNAL MEDICINE    * Community acquired pneumonia of left lower lobe of lung ([address] )   Assessment & Plan     · Still feeling very SOB even at rest, especially when talking  Lungs clear without wheezing or rhonchi  · CXR 12/3 - left lower lobe consolidation consistent with pneumonia   · Flu panel and strep pneumo/legionella urine antigen negative  · Blood cultures negative  · O2 97% on 2L nasal cannula,   ? Check ambulatory and resting room air sat  · Afebrile, WBC trended up a slight bit, 11 05 today, however this is likely a result of IV steroids  · Encourage incentive spirometry and peak flow   · Pulm following, continue IV solu-medrol 40 mg daily, PRN nebs, mucinex, IV ceftriaxone but will switch to PO cefdinir at time of discharge  · Will need to follow up with pulmonology for PFTs as an outpatient, sleep study to rule out JANICE and also a repeat CXR in 6-8 weeks            Hypertension   Assessment & Plan     · BP's have been elevated throughout hospital course with most recent /72  · Will increase Norvasc to 10 mg daily  · Monitor response         Hypothyroid   Assessment & Plan     · TSH low, likely secondary to acute infection   · Continue levothyroxine at current dose of 50 mcg daily   · Pt to follow up with TSH as an outpatient         Hyperlipidemia   Assessment & Plan     · Continue pravastatin        GERD (gastroesophageal reflux disease)   Assessment & Plan     · Continue protonix 40 mg BID          Pharmacologic: Heparin  Mechanical VTE Prophylaxis in Place:  Yes     Patient Centered Rounds: I have performed bedside rounds with nursing staff today      Discussions with Specialists or Other Care Team Provider: Nursing, Case management, Pulmonary      Education and Discussions with Family / Patient: Patient     Time Spent for Care: 30 minutes  More than 50% of total time spent on counseling and coordination of care as described above      Current Length of Stay: 3 day(s)     Current Patient Status: Inpatient   Certification Statement: The patient will continue to require additional inpatient hospital stay due to IV antibiotics and pulmonary workup       Discharge Plan / Estimated Discharge Date: Possibly later today if cleared by pulmonary and patient able to ambulate  If not, tomorrow       Discussed with patient that after discharge she will need to follow up with pulmonary for outpatient sleep study, PFT's and repeat CXR in 6-8 weeks

## 2017-12-06 NOTE — PROGRESS NOTES
Progress Note - Pulmonary   Glorietta Res 67 y o  female MRN: [id number]  Unit/Bed#: -01 Encounter: [id number]    Assessment:  1  Acute hypoxic respiratory insufficiency  2  Shortness of breath  3   Reactive airway disease with exacerbation  4  Left lower lobe pneumonia  5  Leukocytosis, trending down     Plan:  1  Acute hypoxic respiratory insufficiency secondary to Reactive airway disease with exacerbation and Left lower lobe pneumonia with baseline exertional shortness of breath over the last few months  - currently saturating 95% on 2 L via nasal cannula, continue to titrate to keep SpO2 greater than 90%; patient is saturating 90-91 percent with exertion  - continue ceftriaxone alone; can switch to oral cefdinir upon discharge  - blood culture negative after 48 hours, MRSA normal, Legionella and strep pneumo antigens normal  - continue incentive spirometer; advised patient to use 10 times every hour  - recommend monitoring peak flows  - decrease Solu-Medrol to 40 mg q day in a m   - given persistent dyspnea on exertion over the last few months, recommend outpatient follow-up with completion of PFTs with DLCO; patient reports recently having a stress test completed in November of 2017 which was normal; echo completed 3/20/2017 with estimated peak PA pressure of 35 mmHg (mild elevation), will repeat echo today to assess for any worsening that may be contributing to shortness of breath; would recommend outpatient sleep study as untreated JANICE may be contributing to elevated estimated pulmonary artery pressures  - will need repeat chest x-ray in 6-8 weeks as an outpatient  - will set up patient with outpatient appointment in the next week    Chief Complaint:   Shortness of breath    Subjective:   Patient continues to complain of dyspnea on exertion  This has improved  She is able to walk to and from the bathroom without significant difficulty    However, she states she walked around with the nurses and was able to walk the hallway and back but felt weird in the head    Patient reports this did not feel like dizziness or lightheadedness  She is unable to describe this feeling any other way  She states the shortness of breath has been going on for months since her carpal tunnel surgery  Objective:     Vitals: Blood pressure 162/72, pulse 61, temperature 97 7 °F (36 5 °C), temperature source Oral, resp  rate 18, height 4' 7" (1 397 m), weight 102 kg (225 lb 1 4 oz), SpO2 94 %  ,Body mass index is 52 32 kg/m²  Intake/Output Summary (Last 24 hours) at 12/06/17 1305  Last data filed at 12/06/17 0240   Gross per 24 hour   Intake              300 ml   Output             2500 ml   Net            -2200 ml       Invasive Devices     Peripheral Intravenous Line            Peripheral IV 12/02/17 Right Antecubital 4 days                Physical Exam: /72   Pulse 61   Temp 97 7 °F (36 5 °C) (Oral)   Resp 18   Ht 4' 7" (1 397 m)   Wt 102 kg (225 lb 1 4 oz)   SpO2 94%   BMI 52 32 kg/m²   General appearance: alert, appears stated age, cooperative, no distress and morbidly obese  Head: Normocephalic, without obvious abnormality, atraumatic  Lungs: Slightly diminished breath sounds at the bases, no wheezes or rhonchi; otherwise clear to auscultation bilaterally  Heart: Systolic murmur  Extremities: extremities normal, atraumatic, no cyanosis or edema  Skin: Skin color, texture, turgor normal  No rashes or lesions  Neurologic: Mental status: Alert, oriented, thought content appropriate     Labs: I have personally reviewed pertinent lab results  , CBC:   Lab Results   Component Value Date    WBC 11 05 (H) 12/06/2017    HGB 12 1 12/06/2017    HCT 39 6 12/06/2017    MCV 82 12/06/2017     12/06/2017    MCH 24 9 (L) 12/06/2017    MCHC 30 6 (L) 12/06/2017    RDW 16 1 (H) 12/06/2017    MPV 9 9 12/06/2017   Imaging and other studies: I have personally reviewed pertinent reports

## 2017-12-06 NOTE — PROGRESS NOTES
[facility] Internal Medicine    Progress Note - Adriana May 67 y o  female MRN: [id number]    Unit/Bed#: -01 Encounter: [id number]    * Community acquired pneumonia of left lower lobe of lung ([address] )   Assessment & Plan    · Still feeling very SOB even at rest, especially when talking  Lungs clear without wheezing or rhonchi  · CXR 12/3 - left lower lobe consolidation consistent with pneumonia   · Flu panel and strep pneumo/legionella urine antigen negative  · Blood cultures negative  · O2 97% on 2L nasal cannula,   · Check ambulatory and resting room air sat  · Afebrile, WBC trended up a slight bit, 11 05 today, however this is likely a result of IV steroids  · Encourage incentive spirometry and peak flow   · Pulm following, continue IV solu-medrol 40 mg daily, PRN nebs, mucinex, IV ceftriaxone but will switch to PO cefdinir at time of discharge  · Will need to follow up with pulmonology for PFTs as an outpatient, sleep study to rule out JANICE and also a repeat CXR in 6-8 weeks  Hypertension   Assessment & Plan    · BP's have been elevated throughout hospital course with most recent /72  · Will increase Norvasc to 10 mg daily  · Monitor response  Hypothyroid   Assessment & Plan    · TSH low, likely secondary to acute infection   · Continue levothyroxine at current dose of 50 mcg daily   · Pt to follow up with TSH as an outpatient          Hyperlipidemia   Assessment & Plan    · Continue pravastatin         GERD (gastroesophageal reflux disease)   Assessment & Plan    · Continue protonix 40 mg BID          Pharmacologic: Heparin  Mechanical VTE Prophylaxis in Place: Yes    Patient Centered Rounds: I have performed bedside rounds with nursing staff today  Discussions with Specialists or Other Care Team Provider: Nursing, Case management, Pulmonary     Education and Discussions with Family / Patient: Patient    Time Spent for Care: 30 minutes    More than 50% of total time spent on counseling and coordination of care as described above  Current Length of Stay: 3 day(s)    Current Patient Status: Inpatient   Certification Statement: The patient will continue to require additional inpatient hospital stay due to IV antibiotics and pulmonary workup  Discharge Plan / Estimated Discharge Date: Possibly later today if cleared by pulmonary and patient able to ambulate  If not, tomorrow  Discussed with patient that after discharge she will need to follow up with pulmonary for outpatient sleep study, PFT's and repeat CXR in 6-8 weeks  Code Status: Level 1 - Full Code      Subjective:   Patient still complaining of SOB on exertion however states this has been ongoing for several months  Denies any CP  Denies any n/v/d  Reports a dry cough that is occasionally productive for white or yellow sputum  Objective:     Vitals:   Temp (24hrs), Av 8 °F (36 6 °C), Min:97 5 °F (36 4 °C), Max:98 °F (36 7 °C)    HR:  [61-66] 61  Resp:  [18-20] 18  BP: (162-175)/(72-84) 162/72  SpO2:  [92 %-97 %] 94 %  Body mass index is 52 32 kg/m²  Input and Output Summary (last 24 hours): Intake/Output Summary (Last 24 hours) at 17 1013  Last data filed at 17 0240   Gross per 24 hour   Intake              300 ml   Output             2800 ml   Net            -2500 ml       Physical Exam:     Physical Exam    Additional Data:     Labs:      Results from last 7 days  Lab Units 17  0535  17  1117   WBC Thousand/uL 11 05*  < > 8 99   HEMOGLOBIN g/dL 12 1  < > 11 7   HEMATOCRIT % 39 6  < > 37 7   PLATELETS Thousands/uL 368  < > 337   NEUTROS PCT %  --   --  68   LYMPHS PCT %  --   --  15   MONOS PCT %  --   --  8   EOS PCT %  --   --  8*   < > = values in this interval not displayed      Results from last 7 days  Lab Units 17  1502  17  1117   SODIUM mmol/L 140  < > 143   POTASSIUM mmol/L 4 5  < > 3 7   CHLORIDE mmol/L 103  < > 107   CO2 mmol/L 29  < > 30 BUN mg/dL 28*  < > 12   CREATININE mg/dL 0 76  < > 0 80   CALCIUM mg/dL 9 8  < > 9 5   TOTAL PROTEIN g/dL  --   --  7 2   BILIRUBIN TOTAL mg/dL  --   --  0 40   ALK PHOS U/L  --   --  76   ALT U/L  --   --  22   AST U/L  --   --  23   GLUCOSE RANDOM mg/dL 197*  < > 143*   < > = values in this interval not displayed  Results from last 7 days  Lab Units 12/02/17  1117   INR  0 98         Recent Cultures (last 7 days):       Results from last 7 days  Lab Units 12/04/17  0438 12/03/17  1727 12/03/17  1726 12/02/17  1117   BLOOD CULTURE   --  No Growth at 48 hrs  No Growth at 48 hrs   --    INFLUENZA A PCR   --   --   --  None Detected   INFLUENZA B PCR   --   --   --  None Detected   RSV PCR   --   --   --  None Detected   LEGIONELLA URINARY ANTIGEN  Negative  --   --   --        Last 24 Hours Medication List:     [START ON 12/7/2017] amLODIPine 10 mg Oral Daily   aspirin 81 mg Oral Daily   b complex vitamins 1 capsule Oral Daily   calcium carbonate 1 tablet Oral BID With Meals   cefTRIAXone 1,000 mg Intravenous Q24H   heparin (porcine) 5,000 Units Subcutaneous Q8H [address]   levothyroxine 50 mcg Oral Daily   loratadine 10 mg Oral Daily   methylPREDNISolone sodium succinate 40 mg Intravenous Daily   pantoprazole 40 mg Oral BID AC   pravastatin 40 mg Oral Daily With Dinner        Today, Patient Was Seen By: MABEL Stubbs    ** Please Note: Dragon 360 Dictation voice to text software may have been used in the creation of this document   **

## 2017-12-06 NOTE — ASSESSMENT & PLAN NOTE
· BP's have been elevated throughout hospital course with most recent /72  · Will increase Norvasc to 10 mg daily  · Monitor response

## 2017-12-07 VITALS
OXYGEN SATURATION: 97 % | RESPIRATION RATE: 18 BRPM | SYSTOLIC BLOOD PRESSURE: 171 MMHG | DIASTOLIC BLOOD PRESSURE: 71 MMHG | HEIGHT: 55 IN | TEMPERATURE: 98.1 F | BODY MASS INDEX: 52.09 KG/M2 | HEART RATE: 67 BPM | WEIGHT: 225.09 LBS

## 2017-12-07 PROBLEM — Z99.81 OXYGEN DEPENDENT: Status: RESOLVED | Noted: 2017-12-02 | Resolved: 2017-12-07

## 2017-12-07 LAB
BASOPHILS # BLD AUTO: 0.01 THOUSANDS/ΜL (ref 0–0.1)
BASOPHILS NFR BLD AUTO: 0 % (ref 0–1)
EOSINOPHIL # BLD AUTO: 0.03 THOUSAND/ΜL (ref 0–0.61)
EOSINOPHIL NFR BLD AUTO: 0 % (ref 0–6)
ERYTHROCYTE [DISTWIDTH] IN BLOOD BY AUTOMATED COUNT: 16.3 % (ref 11.6–15.1)
HCT VFR BLD AUTO: 40.2 % (ref 34.8–46.1)
HGB BLD-MCNC: 12.2 G/DL (ref 11.5–15.4)
LYMPHOCYTES # BLD AUTO: 2.61 THOUSANDS/ΜL (ref 0.6–4.47)
LYMPHOCYTES NFR BLD AUTO: 18 % (ref 14–44)
MCH RBC QN AUTO: 24.7 PG (ref 26.8–34.3)
MCHC RBC AUTO-ENTMCNC: 30.3 G/DL (ref 31.4–37.4)
MCV RBC AUTO: 81 FL (ref 82–98)
MONOCYTES # BLD AUTO: 1.46 THOUSAND/ΜL (ref 0.17–1.22)
MONOCYTES NFR BLD AUTO: 10 % (ref 4–12)
NEUTROPHILS # BLD AUTO: 10.67 THOUSANDS/ΜL (ref 1.85–7.62)
NEUTS SEG NFR BLD AUTO: 71 % (ref 43–75)
NRBC BLD AUTO-RTO: 0 /100 WBCS
PLATELET # BLD AUTO: 433 THOUSANDS/UL (ref 149–390)
PMV BLD AUTO: 10 FL (ref 8.9–12.7)
RBC # BLD AUTO: 4.94 MILLION/UL (ref 3.81–5.12)
WBC # BLD AUTO: 14.94 THOUSAND/UL (ref 4.31–10.16)

## 2017-12-07 PROCEDURE — 85025 COMPLETE CBC W/AUTO DIFF WBC: CPT | Performed by: NURSE PRACTITIONER

## 2017-12-07 PROCEDURE — 94668 MNPJ CHEST WALL SBSQ: CPT

## 2017-12-07 PROCEDURE — 94664 DEMO&/EVAL PT USE INHALER: CPT

## 2017-12-07 PROCEDURE — 94760 N-INVAS EAR/PLS OXIMETRY 1: CPT

## 2017-12-07 RX ORDER — BENZONATATE 100 MG/1
100 CAPSULE ORAL 3 TIMES DAILY PRN
Qty: 21 CAPSULE | Refills: 0 | Status: SHIPPED | OUTPATIENT
Start: 2017-12-07 | End: 2018-06-22 | Stop reason: ALTCHOICE

## 2017-12-07 RX ORDER — PREDNISONE 20 MG/1
40 TABLET ORAL DAILY
Qty: 9 TABLET | Refills: 0 | Status: SHIPPED | OUTPATIENT
Start: 2017-12-07 | End: 2017-12-07

## 2017-12-07 RX ORDER — AMLODIPINE BESYLATE 10 MG/1
10 TABLET ORAL DAILY
Qty: 30 TABLET | Refills: 0 | Status: SHIPPED | OUTPATIENT
Start: 2017-12-07 | End: 2018-01-31 | Stop reason: ALTCHOICE

## 2017-12-07 RX ORDER — PREDNISONE 10 MG/1
10 TABLET ORAL DAILY
Qty: 9 TABLET | Refills: 0 | Status: SHIPPED | OUTPATIENT
Start: 2017-12-07 | End: 2017-12-16

## 2017-12-07 RX ORDER — CEFDINIR 300 MG/1
300 CAPSULE ORAL EVERY 12 HOURS SCHEDULED
Qty: 4 CAPSULE | Refills: 0 | Status: SHIPPED | OUTPATIENT
Start: 2017-12-07 | End: 2017-12-09

## 2017-12-07 RX ADMIN — HEPARIN SODIUM 5000 UNITS: 5000 INJECTION, SOLUTION INTRAVENOUS; SUBCUTANEOUS at 12:58

## 2017-12-07 RX ADMIN — ACETAMINOPHEN 650 MG: 325 TABLET ORAL at 00:49

## 2017-12-07 RX ADMIN — HYDRALAZINE HYDROCHLORIDE 5 MG: 20 INJECTION INTRAMUSCULAR; INTRAVENOUS at 07:49

## 2017-12-07 RX ADMIN — ASPIRIN 81 MG: 81 TABLET, COATED ORAL at 09:22

## 2017-12-07 RX ADMIN — AMLODIPINE BESYLATE 10 MG: 10 TABLET ORAL at 09:22

## 2017-12-07 RX ADMIN — LEVOTHYROXINE SODIUM 50 MCG: 50 TABLET ORAL at 09:22

## 2017-12-07 RX ADMIN — CEFTRIAXONE 1000 MG: 1 INJECTION, SOLUTION INTRAVENOUS at 12:56

## 2017-12-07 RX ADMIN — PANTOPRAZOLE SODIUM 40 MG: 40 TABLET, DELAYED RELEASE ORAL at 09:23

## 2017-12-07 RX ADMIN — PANTOPRAZOLE SODIUM 40 MG: 40 TABLET, DELAYED RELEASE ORAL at 05:58

## 2017-12-07 RX ADMIN — Medication 1 TABLET: at 12:03

## 2017-12-07 RX ADMIN — LEVOTHYROXINE SODIUM 50 MCG: 50 TABLET ORAL at 05:58

## 2017-12-07 RX ADMIN — PREDNISONE 30 MG: 10 TABLET ORAL at 09:23

## 2017-12-07 RX ADMIN — LORATADINE 10 MG: 10 TABLET ORAL at 09:22

## 2017-12-07 RX ADMIN — HEPARIN SODIUM 5000 UNITS: 5000 INJECTION, SOLUTION INTRAVENOUS; SUBCUTANEOUS at 05:59

## 2017-12-07 NOTE — PROGRESS NOTES
Pt left via [facility] for discharge to home through main lobby with  via private vehicle, all belongings/discharge paperwork and scripts sent with pt

## 2017-12-07 NOTE — PROGRESS NOTES
Pt verbalized understanding of discharge instructions, all belongings packed for discharge, scripts and discharge paperwork given to pt, pt awaiting friend for ride to home, will monitor closely and assist as needed until discharge

## 2017-12-07 NOTE — DISCHARGE SUMMARY
Discharge Summary - [address] [facility]    Patient Information: Adriana Wilkerson 67 y o  female MRN: [id number]  Unit/Bed#: -01 Encounter: [id number]    Discharging Physician / Practitioner: MABEL Ambrosio  PCP: Blair Painting MD  Admission Date: 12/2/2017  Discharge Date: 12/07/17    Reason for Admission: Shortness of breath and cough    Dx at D/C: CAP    Discharge Diagnoses:     Principal Problem:    Community acquired pneumonia of left lower lobe of lung ([address] )  Active Problems:    Hypothyroid    Hypertension    GERD (gastroesophageal reflux disease)    Hyperlipidemia  Resolved Problems:    Oxygen dependent      Consultations During Hospital Stay:  · Pulmonary    Procedures Performed:     · None    Significant Findings / Test Results:     · CXR on Admit: No active pulmonary disease on examination which is somewhat limited secondary to low lung volumes  · CXR 12/3: Left lower lobe consolidation consistent with pneumonia  · Echo: EF 38%, grade 1 diastolic dysfunction  Peak PA pressure 40 mmhg  Mild stenosis of mitral valve  Mean gradient 4 6 mmHg  Moderate stenosis of aortic valve  · Flu/RSV Negative  · Blood cultures x 2 negative at 72 hours  · Strep pneumoniae: negative   · Urine legionella: negative  · MRSA culture negative      Incidental Findings:   · None    Test Results Pending at Discharge (will require follow up): · None     Outpatient Tests Requested:  · Recommend follow up with Pulmonary for outpatient PFTs, Sleep study and repeat imaging in 6-8 weeks to ensure resolution  Complications:  None    Hospital Course:     Adriana Wilkerson is a 67 y o  female patient with a PMH of HTN, reactive airway disease and hypothyroidism who originally presented to the hospital on 12/2/2017 due to shortness of breath and cough  The patient had a non productive cough, wheezing and SOB for several days prior to admission  She went to her PCP and was given a Zpak which she completed    She had reoccurrence of symptoms which prompted an ER visit  She was hypoxic on admission, but improved with supplemental nasal cannula  She was treated with IV steroids and IV ceftriaxone and her symptoms improved  She was evaluated by pulmonary who recommended outpatient follow up for further investigation of GALINDO which has been ongoing for several months  She had a normal stress test in November 2017 and an echo in 2017 which showed a PA pressure of 35 mmHg  A repeat echo was done to be conclusive and it showed and EF 67%, grade 1 diastolic dysfunction  Peak PA pressure 40 mmhg  Mild stenosis of mitral valve  Mean gradient 4 6 mmHg  Moderate stenosis of aortic valve  This may also be contributing to her GALINDO  There is suspicion that she may have underlying JANICE therefore she is recommended to have a sleep study as well as PFT's  On day of discharge the patient is stable  Her BP remains elevated however I increased her Norvasc yesterday to 10 mg daily and today will be her first day of increased dose  She has an appointment with cardiology this month  She still complains of some mild GALINDO which is baseline for her the past several months  Her WBC count is 14 94 on day of discharge, however this is likely related to the steroids  She is afebrile and appears non-toxic  She will be discharged on the remaining course of Cefdinir to complete 7 days total   She will need to follow up with cardiology on 12/12, pulmonary and also her PCP  Patient declined VNA on discharge  Medications at D/C:  Cefdinir 300 mg Q12 for 2 more days  Prednisone 40 mg PO for 3 days, then 20 mg PO for 3 days then stop  Condition at Discharge: stable     Discharge Day Visit / Exam:     Subjective:  Patient feels better today  States that her SOB is better  Concerned about her sisters discharge plan as she is also hospitalized        Vitals: Blood Pressure: (!) 171/71 (12/07/17 0742)  Pulse: 67 (12/07/17 2058)  Temperature: 98 1 °F (36 7 °C) (12/07/17 0742)  Temp Source: Oral (12/07/17 0742)  Respirations: 18 (12/07/17 0742)  Height: 4' 7" (139 7 cm) (12/02/17 1617)  Weight - Scale: 102 kg (225 lb 1 4 oz) (12/02/17 1617)  SpO2: 97 % (12/07/17 0749)     Exam:   Physical Exam   Constitutional: She is oriented to person, place, and time  She appears well-developed and well-nourished  No distress  HENT:   Head: Normocephalic and atraumatic  Mouth/Throat: No oropharyngeal exudate  Eyes: Conjunctivae are normal  Pupils are equal, round, and reactive to light  Neck: Normal range of motion  No JVD present  No tracheal deviation present  No thyromegaly present  Cardiovascular: Normal rate, regular rhythm, normal heart sounds and intact distal pulses  No murmur heard  Pulmonary/Chest: Effort normal and breath sounds normal  No respiratory distress  She has no wheezes  She has no rales  Abdominal: Soft  Bowel sounds are normal  She exhibits no distension  There is no tenderness  Musculoskeletal: Normal range of motion  She exhibits edema  Neurological: She is alert and oriented to person, place, and time  Skin: Skin is warm and dry  No erythema  Psychiatric: She has a normal mood and affect  Nursing note and vitals reviewed  Discussion with Family: Patient    Discharge instructions/Information to patient and family:   See after visit summary for information provided to patient and family  Provisions for Follow-Up Care:  See after visit summary for information related to follow-up care and any pertinent home health orders  Disposition:     Home    For Discharges to [facility] SNF:   · Not Applicable to this Patient - Not Applicable to this Patient    Planned Readmission: No     Discharge Statement:  I spent 35 minutes discharging the patient  This time was spent on the day of discharge  I had direct contact with the patient on the day of discharge   Greater than 50% of the total time was spent examining patient, answering all patient questions, arranging and discussing plan of care with patient as well as directly providing post-discharge instructions  Additional time then spent on discharge activities  Discharge Medications:  See after visit summary for reconciled discharge medications provided to patient and family        ** Please Note: This note has been constructed using a voice recognition system **

## 2017-12-07 NOTE — PROGRESS NOTES
Progress Note - Aristeo Jung 67 y o  female MRN: [id number]  Unit/Bed#: -01 Encounter: [id number]    Assessment:  1   Acute hypoxic respiratory insufficiency  2   Shortness of breath  3   Reactive airway disease with exacerbation  4   Left lower lobe pneumonia  5   Leukocytosis, trending down     Plan:  1   Acute hypoxic respiratory insufficiency secondary to Reactive airway disease with exacerbation and Left lower lobe pneumonia with baseline exertional shortness of breath over the last few months  - currently saturating 98% on 2 L via nasal cannula, continue to titrate to keep SpO2 greater than 90%; patient is saturating 90-91 percent with exertion on RA  - continue ceftriaxone alone Day 5; can switch to oral cefdinir upon discharge  - blood culture negative after 48 hours, MRSA normal, Legionella and strep pneumo antigens normal  - continue incentive spirometer; advised patient to use 10 times every hour  - recommend monitoring peak flows  - switch to PO prednisone with short taper  - mild leukocytosis possibly steroid induced? Can repeat as an outpatient  - continue to use flutter valve  - given persistent dyspnea on exertion over the last few months, recommend outpatient follow-up with completion of PFTs with DLCO; patient reports recently having a stress test completed in November of 2017 which was normal; echo completed 3/20/2017 with estimated peak PA pressure of 35 mmHg (mild elevation); repeat echo with moderate stenosis of the aortic valve and mildly elevated peak PA pressure of 40 mmHg  Do believe JANICE is contributing to elevated pressures  Unclear whether not patient has had a sleep study completed? Patient believes that her sleep testing was normal?  Will attempt to find out as an outpatient  Nocturnal pulse ox report completed in March 2017 demonstrated qualification for nighttime oxygen with roughly 36 desaturations every hour likely consistent with sleep apnea    - will need repeat chest x-ray in 6-8 weeks as an outpatient  - will set up patient with outpatient appointment in the next week    Chief Complaint:   Shortness of breath    Subjective:   Patient feels her shortness of breath has slightly improved  She continues to have baseline shortness of breath but this has been going on for the last few months  She feels ready to go home today  Objective:     Vitals: Blood pressure (!) 171/71, pulse 67, temperature 98 1 °F (36 7 °C), temperature source Oral, resp  rate 18, height 4' 7" (1 397 m), weight 102 kg (225 lb 1 4 oz), SpO2 97 %  ,Body mass index is 52 32 kg/m²  Intake/Output Summary (Last 24 hours) at 12/07/17 1235  Last data filed at 12/07/17 0849   Gross per 24 hour   Intake                0 ml   Output              400 ml   Net             -400 ml       Invasive Devices     Peripheral Intravenous Line            Peripheral IV 12/02/17 Right Antecubital 5 days                Physical Exam: BP (!) 171/71   Pulse 67   Temp 98 1 °F (36 7 °C) (Oral)   Resp 18   Ht 4' 7" (1 397 m)   Wt 102 kg (225 lb 1 4 oz)   SpO2 97%   BMI 52 32 kg/m²   General appearance: alert, appears stated age and cooperative  Head: Normocephalic, without obvious abnormality, atraumatic  Lungs: Right basilar inspiratory rales, otherwise clear to auscultation bilaterally  Heart: Systolic murmur, regular rhythm and rate  Extremities: extremities normal, atraumatic, no cyanosis or edema  Skin: Skin color, texture, turgor normal  No rashes or lesions  Neurologic: Mental status: Alert, oriented, thought content appropriate     Labs: I have personally reviewed pertinent lab results  , CBC:   Lab Results   Component Value Date    WBC 14 94 (H) 12/07/2017    HGB 12 2 12/07/2017    HCT 40 2 12/07/2017    MCV 81 (L) 12/07/2017     (H) 12/07/2017    MCH 24 7 (L) 12/07/2017    MCHC 30 3 (L) 12/07/2017    RDW 16 3 (H) 12/07/2017    MPV 10 0 12/07/2017    NRBC 0 12/07/2017   Imaging and other studies: I have personally reviewed pertinent reports

## 2017-12-07 NOTE — SOCIAL WORK
CM met with patient  Patient was given the IMM  Patient denies she will appeal the decision to discharge today  Patient declined VNA services for home  Patient will be transported home via her ' wife

## 2017-12-07 NOTE — PLAN OF CARE
DISCHARGE PLANNING - CARE MANAGEMENT     Discharge to post-acute care or home with appropriate resources Completed        Potential for Falls     Patient will remain free of falls Completed        Prexisting or High Potential for Compromised Skin Integrity     Skin integrity is maintained or improved Completed

## 2017-12-08 LAB
BACTERIA BLD CULT: NORMAL
BACTERIA BLD CULT: NORMAL

## 2017-12-12 ENCOUNTER — ALLSCRIPTS OFFICE VISIT (OUTPATIENT)
Dept: OTHER | Facility: OTHER | Age: 77
End: 2017-12-12

## 2017-12-13 NOTE — PROGRESS NOTES
Assessment  Assessed    1  Aortic stenosis, moderate (424 1) (I35 0)   2  LVH (left ventricular hypertrophy) (429 3) (I51 7)   3  Diastolic dysfunction (779 8) (I51 9)   4  Nonrheumatic aortic valve insufficiency (424 1) (I35 1)   5  Hyperlipidemia (272 4) (E78 5)   6  Mitral stenosis (394 0) (I05 0)   7  Pulmonary hypertension (416 8) (I27 20)   8  Morbid or severe obesity due to excess calories (278 01) (E66 01)   9  Benign hypertension (401 1) (I10)    Discussion/Summary  Goals and Barriers: The patient has the current Goals: Optimize weight  The patent has the current Barriers: Poor mobility  Medication SE Review and Pt Understands Tx: Possible side effects of new medications were reviewed with the patient/guardian today  The treatment plan was reviewed with the patient/guardian  The patient/guardian understands and agrees with the treatment plan   Counseling Documentation With Imm: The patient was counseled regarding instructions for management,-- risk factor reductions,-- patient and family education,-- importance of compliance with treatment  Discussion Summary:   Patient with multiple medical problems who seems to be doing reasonably well from cardiac standpoint  Patient advised that valvular disease and findings on echocardiogram have remained stable  signs and symptoms of valvular disease, CHF, CAD reviewed with patient  Patient to report any concerns or questions  echo December 2017 EF 32%, grade 1 diastolic dysfunction, mild concentric hypertrophy, mild MS, moderate AS ( peak and mean gradient 55 and 34 mmHg respectively-- compared to 52 and 32 on previous echocardiogram 03/2017)echo July 2016 - normal LVEF, mild concentric LVH, grade 1 diastolic dysfunction, mild MAC, mild MS, moderate AS, mild AI, mild TR, mild PHTNecho 9/6/2014 - EF 65%, mild MR, Mild TR, mild AR and moderate AS  of breath is stable  patient presently recovering from pneumonia and hospitalization earlier this month   Continue to follow up pulmonology  Signs and symptoms of CHF, ACS/CAD reviewed with patient  Patient to report any concerns  Patient does not appear to have any evidence of volume overload at this time  Recent nuclear stress test 11/2017 was negative for ischemia  Hyperlipidemia- continue statin  PCP closely monitors her blood work    consider sleep study when more stable from recent pneumonia  Chief Complaint  Chief Complaint Chronic Condition [facility]: Patient is here today for follow up of chronic conditions described in HPI  History of Present Illness  HPI: Patient presents for follow-up  She was recently seen at [facility] with pneumonia  Echocardiogram was performed which was stable when compared with previous echocardiogram in our office earlier this year  Patient reports that she has some residual fatigue and is not quite feeling herself yet however she improves daily  She is followed by pulmonology for treatment of pneumonia  She otherwise denies chest pain, palpitations, dizziness, presyncope/ syncope, edema, orthopnea, PND  patient does admit to shortness of breath which is chronic and unchanged in the recent past  Patient is limited in terms of her activity due to knee pain  Obesity (Follow-Up): The patient is being seen for follow-up of obesity  The patient reports no change in the condition  Interval symptoms:  denies poor eating habits,-- denies depressed mood,-- denies anxiety,-- stable dyspnea,-- denies fatigue,-- denies back pain-- and-- improved joint pain  Associated symptoms: no poor self esteem,-- no constipation,-- no polyuria,-- no polydipsia-- and-- no daytime drowsiness  Medications:  the patient is adherent to her medication regimen  Mitral Stenosis (Follow-Up): The patient is being seen for follow-up of mitral stenosis  The patient reports no change in the condition    Interval symptoms:  stable shortness of breath,-- denies leg swelling,-- denies orthopnea,-- denies chest pain-- and-- denies palpitations  Associated symptoms: no coughing,-- no paroxysmal nocturnal dyspnea,-- no weight gain,-- no hemoptysis,-- no fever-- and-- no lightheadedness  Medications:  the patient is adherent to her medication regimen  Hyperlipidemia (Follow-Up): The patient states her hyperlipidemia has been stable since the last visit  Symptoms: denies chest pain,-- denies intermittent leg claudication,-- denies muscle pain-- and-- denies muscle weakness  Associated symptoms include no focal neurologic deficits-- and-- no memory loss  Medications: the patient is adherent with her medication regimen  Review of Systems  Cardiology Female ROS:    Cardiac: No complaints of chest pain, no palpitations, no fainting  Skin: No complaints of nonhealing sores or skin rash  Genitourinary: no recurrent urinary tract infections,-- no blood in urine-- and-- no kidney problems  Psychological: No complaints of feeling depressed, anxiety, panic attacks, or difficulty concentrating  General: lack of energy/fatigue, but-- no trouble sleeping,-- no changes in weight-- and-- no fever  Respiratory: no cough/sputum,-- no wheezing-- and-- no hemoptysis  HEENT: No complaints of serious problems, hearing problems, nose problems, throat problems, or snoring  Gastrointestinal: No complaints of liver problems, nausea, vomiting, heartburn, constipation, bloody stools, diarrhea, problems swallowing, adbominal pain, or rectal bleeding  Hematologic: No complaints of bleeding disorders, anemia, blood clots, or excessive brusing  Neurological: No complaints of numbness, tingling, dizziness, weakness, seizures, headaches, syncope or fainting, AM fatigue, daytime sleepiness, no witnessed apnea episodes  Musculoskeletal: arthritis-- left knee; trouble walking  ROS Reviewed:   ROS reviewed  Active Problems  Problems    1  Aortic stenosis, moderate (424 1) (I35 0)   2  Diastolic dysfunction (463 6) (I51 9)   3  Encounter for routine gynecological examination (V72 31) (Z01 419)   4  Hyperlipidemia (272 4) (E78 5)   5  LVH (left ventricular hypertrophy) (429 3) (I51 7)   6  Mitral annular calcification (424 0) (I05 9)   7  Mitral stenosis (394 0) (I05 0)   8  Morbid or severe obesity due to excess calories (278 01) (E66 01)   9  Nonrheumatic aortic valve insufficiency (424 1) (I35 1)   10  Preop cardiovascular exam (V72 81) (Z01 810)   11  Pulmonary hypertension (416 8) (I27 20)   12  Shortness of breath on exertion (786 05) (R06 02)    Past Medical History  Problems    1  History of Arthritis (V13 4)   2  History of Dislocation Of The Right Shoulder (831 00)   3  History of Simple goiter (240 0) (E04 0)   4  History of Skin Cyst Within The Armpits Right  Active Problems And Past Medical History Reviewed: The active problems and past medical history were reviewed and updated today  Surgical History  Problems    1  History of Biopsy Breast Percutaneous Needle Core   2  History of Cholecystotomy   3  History of Dilation And Curettage   4  History of Hysteroscopy  Surgical History Reviewed: The surgical history was reviewed and updated today  Family History  Father    1  Family history of Carcinosarcoma Of The Lung (V16 1)  Family History    2  Family history of CAD (coronary artery disease) (414 00) (I25 10)   3  Family history of Diabetes (250 00) (E11 9)   4  Family history of Hypertension (V17 49)   5  Family history of Reported Family History Of Cancer   6  Family history of Stroke (434 91) (I63 9)  Family History Reviewed: The family history was reviewed and updated today         Social History  Problems    · Denied: History of Being A Social Drinker   · Denied: History of Drinks coffee   · Denied: History of Exercise habits   ·    · Never a smoker   · No alcohol use   · Denied: History of Sexually active   · Denied: History of Smoking Cigarettes   · Denied: History of Uses drugs daily  Social History Reviewed: The social history was reviewed and updated today  The social history was reviewed and is unchanged  Current Meds   1  Albuterol Sulfate (2 5 MG/3ML) 0 083% Inhalation Nebulization Solution; Therapy: (Recorded:[id number]) to Recorded   2  AmLODIPine Besylate 10 MG Oral Tablet; Therapy: (Recorded:[id number]) to Recorded   3  Aspirin EC Low Dose 81 MG Oral Tablet Delayed Release; Therapy: 21Jun2017 to Recorded   4  Calcium 600 + D TABS; Therapy: (Recorded:15Apr2013) to Recorded   5  Levothyroxine Sodium 50 MCG Oral Tablet; TAKE 1 TABLET DAILY; Therapy: (Recorded:15Apr2013) to Recorded   6  Loratadine 10 MG Oral Tablet Recorded   7  Omeprazole 40 MG Oral Capsule Delayed Release; TAKE 1 CAPSULE DAILY; Therapy: (Recorded:15Apr2013) to Recorded   8  Simvastatin 40 MG Oral Tablet; TAKE 1 TABLET DAILY; Therapy: (Recorded:15Apr2013) to Recorded   9  Super B Complex TABS; Therapy: (Recorded:15Apr2013) to Recorded   10  Temazepam 15 MG Oral Capsule; TAKE 1 CAPSULE AT BEDTIME; Therapy: (Recorded:15Apr2013) to Recorded   11  Toviaz 8 MG Oral Tablet Extended Release 24 Hour Recorded  Medication List Reviewed: The medication list was reviewed and updated today  Medication List Reviewed: The medication list was reviewed and updated today  Allergies  Medication    1  No Known Drug Allergies    Vitals  Vital Signs    Recorded: [id number] 01:27PM   Heart Rate 83   Systolic 334   Diastolic 68   Height 4 ft 5 in   Weight 211 lb    BMI Calculated 52 81   BSA Calculated 1 75   O2 Saturation 98       Physical Exam   Constitutional  General appearance: Abnormal  -- Morbidly obese  Eyes  Conjunctiva and Sclera examination: Conjunctiva pink, sclera anicteric  Ears, Nose, Mouth, and Throat - External inspection of ears and nose: Normal without deformities or discharge  -- Oropharynx: Clear, nares are clear, mucous membranes are moist   Neck  Neck and thyroid: Normal, supple, trachea midline, no thyromegaly  Pulmonary  Respiratory effort: No increased work of breathing or signs of respiratory distress  Auscultation of lungs: Abnormal  -- Decreased breath sounds at left base, rhonchi right base  Cardiovascular  Palpation of heart: Normal PMI, no thrills  Auscultation of heart: Abnormal   A grade 3 systolic murmur was heard at the RUSB  -- Grade 3/6 ejection systolic murmur at base  Carotid pulses: Normal, 2+ bilaterally  Examination of extremities for edema and/or varicosities: Normal    Chest - Chest: Normal   Abdomen  Abdomen: Non-tender and no distention  Musculoskeletal Gait and station: Abnormal -- Ambulates with walker  -- Digits and nails: Normal without clubbing or cyanosis  Skin - Skin and subcutaneous tissue: Normal without rashes or lesions  Skin is warm and well perfused, normal turgor  Neurologic - Speech: Normal    Psychiatric - Orientation to person, place, and time: Normal -- Mood and affect: Normal       Future Appointments    Date/Time Provider Specialty Site   01/24/2018 10:50 AM SOLO Crabtree   Pulmonary Medicine [facility]       Signatures   Electronically signed by : Denton Shea, [facility]; Dec 12 2017  3:07PM EST                       (Author)    Electronically signed by : SOLO Lindo ; Dec 12 2017  3:31PM EST                       (Author)

## 2018-01-10 NOTE — RESULT NOTES
Verified Results  NM MYOCARDIAL PERFUSION SPECT (RX STRESS AND/OR REST) [id number] 12:17PM Ricardo Samuels     Test Name Result Flag Reference   NM MYOCARDIAL PERFUSION SPECT (RX STRESS AND/OR REST) (Report)     [address] [facility], [address] [place] [address]   [phone]     Rest/Stress Gated SPECT Myocardial Perfusion Imaging After Regadenoson     Patient: Regina Henry   MR number: [id number]   Account number: [de-identified]   : 1940   Age: 67 years   Gender: Female   Status: Outpatient   Location: [facility]   Height: 53 in   Weight: 214 lb   BP: 156/ 96 mmHg     Allergies: LATEX, NEOMYCIN-BACITRACIN ZN-POLYMYX     Diagnosis: R07 9 - Chest pain, unspecified     Primary Physician: Rocío Martinez MD   Interpreting Physician: Romina Valle MD   Referring Physician: Romina Valle MD   Technician: Bill Baxter BS, BA, AART(N)   Group: [facility]   Other: John Paul Bal MS, CCT     INDICATIONS: Detection of coronary artery disease  HISTORY: The patient is a 67year old  female  Chest pain status: chest pain  Coronary artery disease risk factors: dyslipidemia, family history of premature coronary artery disease, and post-menopausal state  Cardiovascular   history: aortic valve disease  Co-morbidity: obesity  Medications: aspirin, a lipid lowering agent, and thyroid medications  REST ECG: Normal sinus rhythm  Poor R wave progression in precordial leads  PROCEDURE: The study was performed at [address] [place] [address]  A regadenoson infusion pharmacologic stress test was performed  Gated SPECT myocardial perfusion imaging was performed after stress and at rest  Systolic blood pressure   was 156 mmHg, at the start of the study  Diastolic blood pressure was 96 mmHg, at the start of the study  The heart rate was 75 bpm, at the start of the study   Patient was not experiencing chest pain at the time of the injection of the   radiopharmaceutical    Regadenoson protocol:   HR bpm SBP mmHg DBP mmHg Symptoms ST change Rhythm/conduct   Baseline 75 156 96 none none NSR, no ectopy, rare PVC's   Immediate 91 138 78 -- -- occasional PVC's   1 min 89 -- -- headache -- occasional PVC's   2 min 87 132 78 -- -- --   No medications or fluids given  IV aminophylline was administered  STRESS SUMMARY: Duration of pharmacologic stress was 3 min  Maximal heart rate during stress was 91 bpm  The heart rate response to stress was normal  There was normal resting blood pressure with an appropriate response to stress  The   rate-pressure product for the peak heart rate and blood pressure was 19822  There was no chest pain during stress  The stress test was terminated due to protocol completion  The stress ECG was negative for ischemia  Arrhythmia during   stress: isolated premature ventricular beats  ISOTOPE ADMINISTRATION:   Resting isotope administration Stress isotope administration   Agent Sestamibi Sestamibi   Dose 15 62 mCi 47 1 mCi   Date 11/01/2017 11/01/2017   Injection-image interval 30 min 45 min     The radiopharmaceutical was injected at the peak effect of pharmacologic stress  MYOCARDIAL PERFUSION IMAGING:   The image quality was good  Left ventricular size was normal      PERFUSION DEFECTS:   - There were no perfusion defects  GATED SPECT:   The calculated left ventricular ejection fraction was 61 %  Left ventricular ejection fraction was within normal limits by visual estimate  There was no diagnostic evidence for left ventricular regional abnormality  SUMMARY:   - Stress results: There was no chest pain during stress  - ECG conclusions: The stress ECG was negative for ischemia  Arrhythmia during stress: isolated premature ventricular beats  - Perfusion imaging: There were no perfusion defects    - Gated SPECT: The calculated left ventricular ejection fraction was 61 %   Left ventricular ejection fraction was within normal limits by visual estimate  There was no diagnostic evidence for left ventricular regional abnormality  IMPRESSIONS: Normal study after pharmacologic stress  Myocardial perfusion imaging was normal at rest and with stress   Left ventricular systolic function was normal      Prepared and signed by     Shannan Cantu MD   Signed 11/01/2017 17:31:28

## 2018-01-12 VITALS
OXYGEN SATURATION: 93 % | HEART RATE: 68 BPM | WEIGHT: 214 LBS | BODY MASS INDEX: 49.53 KG/M2 | DIASTOLIC BLOOD PRESSURE: 62 MMHG | HEIGHT: 55 IN | SYSTOLIC BLOOD PRESSURE: 130 MMHG

## 2018-01-13 VITALS
WEIGHT: 206 LBS | HEIGHT: 55 IN | SYSTOLIC BLOOD PRESSURE: 124 MMHG | BODY MASS INDEX: 47.67 KG/M2 | OXYGEN SATURATION: 97 % | DIASTOLIC BLOOD PRESSURE: 62 MMHG | HEART RATE: 76 BPM

## 2018-01-16 DIAGNOSIS — R06.02 SHORTNESS OF BREATH: ICD-10-CM

## 2018-01-23 VITALS
OXYGEN SATURATION: 98 % | BODY MASS INDEX: 48.83 KG/M2 | HEART RATE: 83 BPM | SYSTOLIC BLOOD PRESSURE: 130 MMHG | WEIGHT: 211 LBS | HEIGHT: 55 IN | DIASTOLIC BLOOD PRESSURE: 68 MMHG

## 2018-01-24 VITALS
HEART RATE: 66 BPM | WEIGHT: 211 LBS | OXYGEN SATURATION: 97 % | SYSTOLIC BLOOD PRESSURE: 116 MMHG | BODY MASS INDEX: 48.83 KG/M2 | DIASTOLIC BLOOD PRESSURE: 62 MMHG | HEIGHT: 55 IN

## 2018-01-31 ENCOUNTER — OFFICE VISIT (OUTPATIENT)
Dept: PULMONOLOGY | Facility: CLINIC | Age: 78
End: 2018-01-31
Payer: MEDICARE

## 2018-01-31 ENCOUNTER — TRANSCRIBE ORDERS (OUTPATIENT)
Dept: LAB | Facility: CLINIC | Age: 78
End: 2018-01-31

## 2018-01-31 ENCOUNTER — APPOINTMENT (OUTPATIENT)
Dept: LAB | Facility: CLINIC | Age: 78
End: 2018-01-31
Payer: MEDICARE

## 2018-01-31 VITALS
DIASTOLIC BLOOD PRESSURE: 66 MMHG | OXYGEN SATURATION: 99 % | BODY MASS INDEX: 49.99 KG/M2 | WEIGHT: 216 LBS | HEIGHT: 55 IN | HEART RATE: 76 BPM | SYSTOLIC BLOOD PRESSURE: 130 MMHG

## 2018-01-31 DIAGNOSIS — G47.33 OSA (OBSTRUCTIVE SLEEP APNEA): ICD-10-CM

## 2018-01-31 DIAGNOSIS — J18.9 COMMUNITY ACQUIRED PNEUMONIA OF LEFT LOWER LOBE OF LUNG: ICD-10-CM

## 2018-01-31 DIAGNOSIS — J45.20 MILD INTERMITTENT REACTIVE AIRWAY DISEASE WITHOUT COMPLICATION: ICD-10-CM

## 2018-01-31 DIAGNOSIS — J45.20 MILD INTERMITTENT REACTIVE AIRWAY DISEASE WITHOUT COMPLICATION: Primary | ICD-10-CM

## 2018-01-31 PROBLEM — J45.909 REACTIVE AIRWAY DISEASE WITHOUT COMPLICATION: Status: ACTIVE | Noted: 2018-01-31

## 2018-01-31 PROCEDURE — 36415 COLL VENOUS BLD VENIPUNCTURE: CPT

## 2018-01-31 PROCEDURE — 82785 ASSAY OF IGE: CPT

## 2018-01-31 PROCEDURE — 86003 ALLG SPEC IGE CRUDE XTRC EA: CPT

## 2018-01-31 PROCEDURE — 99214 OFFICE O/P EST MOD 30 MIN: CPT | Performed by: PHYSICIAN ASSISTANT

## 2018-01-31 RX ORDER — FLUTICASONE FUROATE AND VILANTEROL 100; 25 UG/1; UG/1
1 POWDER RESPIRATORY (INHALATION) DAILY
COMMUNITY
End: 2018-08-13 | Stop reason: SDUPTHER

## 2018-01-31 RX ORDER — VALSARTAN 80 MG/1
80 TABLET ORAL DAILY
COMMUNITY
End: 2018-08-13 | Stop reason: ALTCHOICE

## 2018-01-31 NOTE — PATIENT INSTRUCTIONS
Recommend home sleep study to be done as well as allergy testing  Continue Breo, RINSE mouth after use  Continue albuterol 4 times per day as needed  Continue with flonase, can also use saline nasal spray  Follow up in 4-5 weeks once sleep study and allergy testing done

## 2018-01-31 NOTE — PROGRESS NOTES
Assessment:    1  Mild intermittent reactive airway disease without complication  [facility], Adult   2  JANICE (obstructive sleep apnea)  Home Study   3  Community acquired pneumonia of left lower lobe of lung ([address] )             Plan:     Reactive airway disease - She did require another prednisone taper since her last visit early December and was started on Breo by her PCP which I asked her to continue  Possible an allergen vs her sinus issues causing asthma  Will send allergy testing, she does have an appt with ENT next week  Will continue with flonase, claritin, can also add saline nasal spray  Continue with albuterol 4 times per day as needed  Recent LLL pneumonia - Repeat CXR was done, we do not yet have the report but she was told the pneumonia has resolved  Will obtain report from [facility] - we have the disk but it does not work  JANICE - She has night time awakenings, daytime sleepiness and given body habitus, have suspicion for sleep apnea  She has mild pulmonary hypertension on echo which JANICE could be contributing to  Will order home sleep study  Follow up once sleep study is done  Subjective:     Patient ID: Asiya Dasilva is a 65 y o  female  Chief Complaint:  Patient is a 66 yo female nonsmoker with recent hospitalization in early December for LLL pneumonia and reactive airway disease  She had been doing well, then developed wheezing/SOB again for which her PCP put her on prednisone taper as well as Breo  She has been doing well, has not had the need to use her albuterol  She does have chronic sinus congestion, postnasal drip and ear congestion for which she has an appt with ENT next week  Possible sleep study was discussed with her in the past given mild pulmonary HTN  She does have some night time awakenings, excessive daytime sleepiness  Review of Systems   Constitutional: Negative  HENT: Positive for congestion and postnasal drip      Respiratory: Positive for shortness of breath  Cardiovascular: Negative  Gastrointestinal: Negative  Genitourinary: Negative  Neurological: Negative  Objective:    Physical Exam   Constitutional: She is oriented to person, place, and time  She appears well-developed and well-nourished  No distress  HENT:   Crowded oropharyngeal airway   Neck:   Short and wide neck   Cardiovascular: Normal rate and regular rhythm  Pulmonary/Chest: Effort normal and breath sounds normal    Musculoskeletal: She exhibits no edema  Neurological: She is alert and oriented to person, place, and time

## 2018-02-01 LAB
A ALTERNATA IGE QN: <0.1 KUA/I
A FUMIGATUS IGE QN: <0.1 KUA/I
ALLERGEN COMMENT: NORMAL
BERMUDA GRASS IGE QN: <0.1 KUA/I
BOXELDER IGE QN: <0.1 KUA/I
C HERBARUM IGE QN: <0.1 KUA/I
CAT DANDER IGE QN: <0.1 KUA/I
CMN PIGWEED IGE QN: <0.1 KUA/I
COMMON RAGWEED IGE QN: <0.1 KUA/I
COTTONWOOD IGE QN: <0.1 KUA/I
D FARINAE IGE QN: <0.1 KUA/I
D PTERONYSS IGE QN: <0.1 KUA/I
DOG DANDER IGE QN: <0.1 KUA/I
LONDON PLANE IGE QN: <0.1 KUA/I
MOUSE URINE PROT IGE QN: <0.1 KUA/I
MT JUNIPER IGE QN: <0.1 KUA/I
MUGWORT IGE QN: <0.1 KUA/I
P NOTATUM IGE QN: <0.1 KUA/I
ROACH IGE QN: <0.1 KUA/I
SHEEP SORREL IGE QN: <0.1 KUA/I
SILVER BIRCH IGE QN: <0.1 KUA/I
TIMOTHY IGE QN: <0.1 KUA/I
TOTAL IGE SMQN RAST: 5.47 KU/L (ref 0–113)
WALNUT IGE QN: <0.1 KUA/I
WHITE ASH IGE QN: <0.1 KUA/I
WHITE ELM IGE QN: <0.1 KUA/I
WHITE MULBERRY IGE QN: <0.1 KUA/I
WHITE OAK IGE QN: <0.1 KUA/I

## 2018-04-06 ENCOUNTER — HOSPITAL ENCOUNTER (OUTPATIENT)
Dept: SLEEP CENTER | Facility: CLINIC | Age: 78
Discharge: HOME | End: 2018-04-06
Payer: MEDICARE

## 2018-04-06 DIAGNOSIS — G47.33 OSA (OBSTRUCTIVE SLEEP APNEA): ICD-10-CM

## 2018-04-06 PROCEDURE — G0399 HOME SLEEP TEST/TYPE 3 PORTA: HCPCS

## 2018-04-06 PROCEDURE — 95806 SLEEP STUDY UNATT&RESP EFFT: CPT | Performed by: INTERNAL MEDICINE

## 2018-04-16 ENCOUNTER — TELEPHONE (OUTPATIENT)
Dept: PULMONOLOGY | Facility: CLINIC | Age: 78
End: 2018-04-16

## 2018-04-16 DIAGNOSIS — G47.33 OSA (OBSTRUCTIVE SLEEP APNEA): Primary | ICD-10-CM

## 2018-06-05 ENCOUNTER — APPOINTMENT (OUTPATIENT)
Dept: LAB | Facility: HOSPITAL | Age: 78
End: 2018-06-05
Payer: MEDICARE

## 2018-06-05 ENCOUNTER — TRANSCRIBE ORDERS (OUTPATIENT)
Dept: ADMINISTRATIVE | Facility: HOSPITAL | Age: 78
End: 2018-06-05

## 2018-06-05 DIAGNOSIS — E78.5 HYPERLIPIDEMIA, UNSPECIFIED HYPERLIPIDEMIA TYPE: ICD-10-CM

## 2018-06-05 DIAGNOSIS — M25.50 PAIN IN JOINT, MULTIPLE SITES: ICD-10-CM

## 2018-06-05 DIAGNOSIS — R53.83 FATIGUE, UNSPECIFIED TYPE: ICD-10-CM

## 2018-06-05 DIAGNOSIS — R53.83 FATIGUE, UNSPECIFIED TYPE: Primary | ICD-10-CM

## 2018-06-05 LAB
ALBUMIN SERPL BCP-MCNC: 3.6 G/DL (ref 3.5–5)
ALP SERPL-CCNC: 81 U/L (ref 46–116)
ALT SERPL W P-5'-P-CCNC: 22 U/L (ref 12–78)
ANION GAP SERPL CALCULATED.3IONS-SCNC: 8 MMOL/L (ref 4–13)
AST SERPL W P-5'-P-CCNC: 20 U/L (ref 5–45)
BASOPHILS # BLD AUTO: 0.05 THOUSANDS/ΜL (ref 0–0.1)
BASOPHILS NFR BLD AUTO: 1 % (ref 0–1)
BILIRUB SERPL-MCNC: 0.4 MG/DL (ref 0.2–1)
BILIRUB UR QL STRIP: NEGATIVE
BUN SERPL-MCNC: 19 MG/DL (ref 5–25)
CALCIUM SERPL-MCNC: 9.9 MG/DL (ref 8.3–10.1)
CHLORIDE SERPL-SCNC: 102 MMOL/L (ref 100–108)
CHOLEST SERPL-MCNC: 133 MG/DL (ref 50–200)
CLARITY UR: CLEAR
CO2 SERPL-SCNC: 31 MMOL/L (ref 21–32)
COLOR UR: YELLOW
CREAT SERPL-MCNC: 0.74 MG/DL (ref 0.6–1.3)
CRP SERPL QL: 24.6 MG/L
EOSINOPHIL # BLD AUTO: 0.6 THOUSAND/ΜL (ref 0–0.61)
EOSINOPHIL NFR BLD AUTO: 7 % (ref 0–6)
ERYTHROCYTE [DISTWIDTH] IN BLOOD BY AUTOMATED COUNT: 17.4 % (ref 11.6–15.1)
ERYTHROCYTE [SEDIMENTATION RATE] IN BLOOD: 35 MM/HOUR (ref 0–20)
GFR SERPL CREATININE-BSD FRML MDRD: 78 ML/MIN/1.73SQ M
GLUCOSE P FAST SERPL-MCNC: 92 MG/DL (ref 65–99)
GLUCOSE UR STRIP-MCNC: NEGATIVE MG/DL
HCT VFR BLD AUTO: 37.3 % (ref 34.8–46.1)
HDLC SERPL-MCNC: 65 MG/DL (ref 40–60)
HGB BLD-MCNC: 11 G/DL (ref 11.5–15.4)
HGB UR QL STRIP.AUTO: NEGATIVE
IMM GRANULOCYTES # BLD AUTO: 0.05 THOUSAND/UL (ref 0–0.2)
IMM GRANULOCYTES NFR BLD AUTO: 1 % (ref 0–2)
KETONES UR STRIP-MCNC: NEGATIVE MG/DL
LDLC SERPL CALC-MCNC: 51 MG/DL (ref 0–100)
LEUKOCYTE ESTERASE UR QL STRIP: NEGATIVE
LYMPHOCYTES # BLD AUTO: 2.04 THOUSANDS/ΜL (ref 0.6–4.47)
LYMPHOCYTES NFR BLD AUTO: 25 % (ref 14–44)
MAGNESIUM SERPL-MCNC: 2 MG/DL (ref 1.6–2.6)
MCH RBC QN AUTO: 23.8 PG (ref 26.8–34.3)
MCHC RBC AUTO-ENTMCNC: 29.5 G/DL (ref 31.4–37.4)
MCV RBC AUTO: 81 FL (ref 82–98)
MONOCYTES # BLD AUTO: 0.86 THOUSAND/ΜL (ref 0.17–1.22)
MONOCYTES NFR BLD AUTO: 11 % (ref 4–12)
NEUTROPHILS # BLD AUTO: 4.57 THOUSANDS/ΜL (ref 1.85–7.62)
NEUTS SEG NFR BLD AUTO: 55 % (ref 43–75)
NITRITE UR QL STRIP: NEGATIVE
NONHDLC SERPL-MCNC: 68 MG/DL
NRBC BLD AUTO-RTO: 0 /100 WBCS
PH UR STRIP.AUTO: 6 [PH] (ref 4.5–8)
PLATELET # BLD AUTO: 398 THOUSANDS/UL (ref 149–390)
PMV BLD AUTO: 8.7 FL (ref 8.9–12.7)
POTASSIUM SERPL-SCNC: 4.2 MMOL/L (ref 3.5–5.3)
PROT SERPL-MCNC: 7.8 G/DL (ref 6.4–8.2)
PROT UR STRIP-MCNC: NEGATIVE MG/DL
RBC # BLD AUTO: 4.62 MILLION/UL (ref 3.81–5.12)
RHEUMATOID FACT SER QL LA: NEGATIVE
SODIUM SERPL-SCNC: 141 MMOL/L (ref 136–145)
SP GR UR STRIP.AUTO: <=1.005 (ref 1–1.03)
TRIGL SERPL-MCNC: 83 MG/DL
TSH SERPL DL<=0.05 MIU/L-ACNC: 1.57 UIU/ML (ref 0.36–3.74)
URATE SERPL-MCNC: 5 MG/DL (ref 2–6.8)
UROBILINOGEN UR QL STRIP.AUTO: 0.2 E.U./DL
WBC # BLD AUTO: 8.17 THOUSAND/UL (ref 4.31–10.16)

## 2018-06-05 PROCEDURE — 80061 LIPID PANEL: CPT

## 2018-06-05 PROCEDURE — 86038 ANTINUCLEAR ANTIBODIES: CPT

## 2018-06-05 PROCEDURE — 84550 ASSAY OF BLOOD/URIC ACID: CPT

## 2018-06-05 PROCEDURE — 85652 RBC SED RATE AUTOMATED: CPT

## 2018-06-05 PROCEDURE — 86140 C-REACTIVE PROTEIN: CPT

## 2018-06-05 PROCEDURE — 36415 COLL VENOUS BLD VENIPUNCTURE: CPT

## 2018-06-05 PROCEDURE — 85025 COMPLETE CBC W/AUTO DIFF WBC: CPT

## 2018-06-05 PROCEDURE — 86618 LYME DISEASE ANTIBODY: CPT

## 2018-06-05 PROCEDURE — 84443 ASSAY THYROID STIM HORMONE: CPT

## 2018-06-05 PROCEDURE — 83735 ASSAY OF MAGNESIUM: CPT

## 2018-06-05 PROCEDURE — 80053 COMPREHEN METABOLIC PANEL: CPT

## 2018-06-05 PROCEDURE — 81003 URINALYSIS AUTO W/O SCOPE: CPT | Performed by: FAMILY MEDICINE

## 2018-06-05 PROCEDURE — 86430 RHEUMATOID FACTOR TEST QUAL: CPT

## 2018-06-06 LAB
B BURGDOR IGG SER IA-ACNC: 0.14
B BURGDOR IGM SER IA-ACNC: 0.22
RYE IGE QN: NEGATIVE

## 2018-06-22 ENCOUNTER — OFFICE VISIT (OUTPATIENT)
Dept: PULMONOLOGY | Facility: CLINIC | Age: 78
End: 2018-06-22
Payer: MEDICARE

## 2018-06-22 VITALS
DIASTOLIC BLOOD PRESSURE: 60 MMHG | HEART RATE: 68 BPM | SYSTOLIC BLOOD PRESSURE: 130 MMHG | HEIGHT: 55 IN | BODY MASS INDEX: 51.14 KG/M2 | OXYGEN SATURATION: 98 % | WEIGHT: 221 LBS

## 2018-06-22 DIAGNOSIS — G47.33 OSA (OBSTRUCTIVE SLEEP APNEA): Primary | ICD-10-CM

## 2018-06-22 DIAGNOSIS — J45.20 MILD INTERMITTENT REACTIVE AIRWAY DISEASE WITHOUT COMPLICATION: ICD-10-CM

## 2018-06-22 PROCEDURE — 99213 OFFICE O/P EST LOW 20 MIN: CPT | Performed by: PHYSICIAN ASSISTANT

## 2018-06-22 NOTE — PROGRESS NOTES
Assessment:    1  JANICE (obstructive sleep apnea)     2  Mild intermittent reactive airway disease without complication             Plan:     JANICE - She has been 100% compliant with her CPAP, received it about 4 weeks ago with AHI of 7 4 down from 52  She is feeling more well rested, sleeps better at night  Will continue at the current settings  She has been off the Bailey, has not had the need for her rescue inhaler  She will let us know if any increased need for the rescue medications  Follow up in 6 months or sooner if necessary  Subjective:     Patient ID: Nikki Leonard is a 65 y o  female  Chief Complaint:  Patient is a 66 yo female nonsmoker with hospitalization in 12/2017 for LLL pneumonia and reactive airway disease  In January she was given prednisone taper and Breo for increased SOB/wheezing  She follows with ENT for ear congestion/pain  She was diagnosed with severe sleep apnea and was started on CPAP  She is here today for follow up  She is doing well with her breathing  She has not been using the Bailey, has not had the need to use her rescue medications  She is following with ENT for chronic ear pain  She is doing well with the CPAP, has used it every night  She is sleeping better at night, does feel slightly more well rested through the day  Review of Systems   Constitutional: Negative  HENT: Positive for ear pain  Respiratory: Negative  Cardiovascular: Negative  Gastrointestinal: Negative  Genitourinary: Negative  Musculoskeletal: Positive for arthralgias  Skin: Negative  Allergic/Immunologic: Negative  Neurological: Negative  Psychiatric/Behavioral: Negative  Objective:    Physical Exam   Constitutional: She is oriented to person, place, and time  She appears well-developed and well-nourished  No distress  HENT:   Crowded oropharyngeal airway   Eyes: Pupils are equal, round, and reactive to light     Neck:   Short and wide neck Cardiovascular: Normal rate and regular rhythm  Pulmonary/Chest: Effort normal and breath sounds normal    Abdominal: Soft  Musculoskeletal: She exhibits no edema  Neurological: She is alert and oriented to person, place, and time  Skin: Skin is warm and dry  Psychiatric: She has a normal mood and affect   Her behavior is normal  Judgment and thought content normal

## 2018-07-03 ENCOUNTER — OFFICE VISIT (OUTPATIENT)
Dept: FAMILY MEDICINE CLINIC | Facility: CLINIC | Age: 78
End: 2018-07-03
Payer: MEDICARE

## 2018-07-03 VITALS
HEART RATE: 75 BPM | SYSTOLIC BLOOD PRESSURE: 118 MMHG | HEIGHT: 55 IN | WEIGHT: 219 LBS | OXYGEN SATURATION: 92 % | RESPIRATION RATE: 17 BRPM | BODY MASS INDEX: 50.68 KG/M2 | DIASTOLIC BLOOD PRESSURE: 66 MMHG | TEMPERATURE: 99.2 F

## 2018-07-03 DIAGNOSIS — E03.9 HYPOTHYROIDISM, UNSPECIFIED TYPE: Chronic | ICD-10-CM

## 2018-07-03 DIAGNOSIS — I10 ESSENTIAL HYPERTENSION: ICD-10-CM

## 2018-07-03 DIAGNOSIS — H65.92 LEFT SEROUS OTITIS MEDIA, UNSPECIFIED CHRONICITY: Primary | ICD-10-CM

## 2018-07-03 DIAGNOSIS — Z12.11 SCREENING FOR MALIGNANT NEOPLASM OF COLON: ICD-10-CM

## 2018-07-03 DIAGNOSIS — G47.33 OSA (OBSTRUCTIVE SLEEP APNEA): ICD-10-CM

## 2018-07-03 DIAGNOSIS — E78.2 MIXED HYPERLIPIDEMIA: Chronic | ICD-10-CM

## 2018-07-03 DIAGNOSIS — I35.0 AORTIC STENOSIS, MODERATE: ICD-10-CM

## 2018-07-03 PROCEDURE — 99204 OFFICE O/P NEW MOD 45 MIN: CPT | Performed by: FAMILY MEDICINE

## 2018-07-03 RX ORDER — OMEPRAZOLE 40 MG/1
40 CAPSULE, DELAYED RELEASE ORAL 2 TIMES DAILY
Refills: 1 | COMMUNITY
Start: 2018-06-23 | End: 2018-10-11 | Stop reason: HOSPADM

## 2018-07-03 RX ORDER — FLUTICASONE PROPIONATE 50 MCG
1 SPRAY, SUSPENSION (ML) NASAL DAILY
COMMUNITY
End: 2018-10-05

## 2018-07-03 RX ORDER — AMOXICILLIN AND CLAVULANATE POTASSIUM 875; 125 MG/1; MG/1
1 TABLET, FILM COATED ORAL EVERY 12 HOURS SCHEDULED
Qty: 20 TABLET | Refills: 0 | Status: SHIPPED | OUTPATIENT
Start: 2018-07-03 | End: 2018-07-13

## 2018-07-03 NOTE — PATIENT INSTRUCTIONS
Will request and review old records from your previous physician  For now, take the Augmentin twice daily for the next 10 days  I also suggest you  align 4 mg as a probiotic to protect her stomach as this is the 2nd round of antibiotics you will be on  Your blood work looks perfect other than inflammatory changes  I would not change her medications at this time  Your blood pressure is perfect and your cholesterol is at goal  If there is no change in the head pressure at the f/u appt may consider ct or mri of the brain

## 2018-07-03 NOTE — PROGRESS NOTES
Assessment/Plan:     Chronic Problems:  Hypothyroid  TSH is perfect same dose of levothyroxine  JANICE (obstructive sleep apnea)    Keep your follow-up with Dr Cullen Roberts and continue the CPAP  Hypertension    Blood pressure is perfect stay on the same dose of valsartan  No added salt in the diet  Hyperlipidemia   Cholesterol panel is at goal continue the current medications    Aortic stenosis, moderate    Keep the appointment with Dr Daniela Cox      Visit Diagnosis:  Diagnoses and all orders for this visit:    Left serous otitis media, unspecified chronicity  Comments:   will treat with Augmentin for 10 days and recheck here  Will need ENT evaluation if not better at that time  Orders:  -     amoxicillin-clavulanate (AUGMENTIN) 875-125 mg per tablet; Take 1 tablet by mouth every 12 (twelve) hours for 10 days    Aortic stenosis, moderate    Mixed hyperlipidemia    JANICE (obstructive sleep apnea)    Essential hypertension    Hypothyroidism, unspecified type    Screening for malignant neoplasm of colon  -     Fecal Globin By [employer]  (Medicare); Future    Other orders  -     omeprazole (PriLOSEC) 40 MG capsule; Take 40 mg by mouth 2 (two) times a day  -     sertraline (ZOLOFT) 50 mg tablet; Take 50 mg by mouth daily  -     fluticasone (FLONASE) 50 mcg/act nasal spray; 1 spray into each nostril daily          Subjective:    Patient ID: Ailyn Jung is a 65 y o  female  Pt is here to establish  Was followed in [employer] by Dr Ashlyn Saab but needs a closer provider  Pt has concerns re: pressure in her head  Has an ear problem on the left side  Seen by Dr Jc Rodriguez end of June and told she has fluid in the right ear  Seen by her pmd but not given any further abx  Feels pressure in the ear and beat in the left ear  Has pressure in her head now  No fevers, sweats or chills  No other complaints today  Last labs done in June  Takes all other meds as directed  No side effects noted   Uses cpap at home        The following portions of the patient's history were reviewed and updated as appropriate: allergies, current medications, past family history, past medical history, past social history, past surgical history and problem list     Review of Systems   Constitutional: Positive for fatigue  Negative for chills, diaphoresis and fever  HENT: Positive for sneezing  Negative for ear pain (but ear pressure on the left  ), postnasal drip, rhinorrhea, sinus pain, sinus pressure and sore throat  Eyes:        H/o cataracts and follows with [facility]  Respiratory: Negative for cough, shortness of breath and wheezing  Denies asthma  States she had pneumonia and was given albuterol but does not need it  Follows with Dr Henry Mckeon for sleep apnea  On cpap at home  Cardiovascular: Negative for chest pain, palpitations and leg swelling  Gastrointestinal: Negative for abdominal pain, constipation, diarrhea, nausea and vomiting  Colonoscopy 5 years ago  Due now  Not sure if she had polyps  Genitourinary: Negative for dysuria, frequency and urgency  H/o urge incontinence  Musculoskeletal: Positive for back pain and gait problem (ambulates with a cane  No falls reported  )  Negative for arthralgias (h/o knee and hip replacements  )  Skin: Negative for rash and wound  Neurological: Positive for light-headedness (at times  Feels she can't explain what is in her head other than pressure  no pain)  Negative for dizziness and headaches (but head pressure  )  Psychiatric/Behavioral: Positive for dysphoric mood  The patient is nervous/anxious  On sertraline recently  Thinks it may help  /66   Pulse 75   Temp 99 2 °F (37 3 °C)   Resp 17   Ht 4' 7" (1 397 m)   Wt 99 3 kg (219 lb)   SpO2 92%   BMI 50 90 kg/m²   Social History     Social History    Marital status: Single     Spouse name: N/A    Number of children: N/A    Years of education: N/A     Occupational History    Not on file  Social History Main Topics    Smoking status: Never Smoker    Smokeless tobacco: Never Used    Alcohol use No    Drug use: No    Sexual activity: No     Other Topics Concern    Not on file     Social History Narrative    Denied drinking coffee    Denied exercise habits         Past Medical History:   Diagnosis Date    Arthritis     Cardiac disease     "leaky valve"    Coronary artery disease     Disease of thyroid gland     Dislocation of right shoulder joint     Hypertension     Murmur, cardiac     Simple goiter     Skin cyst     within the armpits, right     Family History   Problem Relation Age of Onset    Diabetes Mother     Stroke Mother     Cancer Father     Lung cancer Father     Diabetes Sister     Heart disease Sister     Coronary artery disease Family     Diabetes Family     Hypertension Family     Cancer Family     Stroke Family      Past Surgical History:   Procedure Laterality Date    BREAST BIOPSY      CARPAL TUNNEL RELEASE      CHOLECYSTECTOMY      DILATION AND CURETTAGE OF UTERUS      HYSTEROSCOPY      JOINT REPLACEMENT      b/l knee     JOINT REPLACEMENT  2007    left hip       Current Outpatient Prescriptions:     albuterol (2 5 mg/3 mL) 0 083 % nebulizer solution, Take 3 mL by nebulization every 6 (six) hours as needed for wheezing or shortness of breath, Disp: 75 mL, Rfl: 0    aspirin (ECOTRIN LOW STRENGTH) 81 mg EC tablet, Take 1 tablet by mouth daily, Disp: 30 tablet, Rfl: 0    b complex vitamins capsule, Take 1 capsule by mouth daily, Disp: , Rfl:     calcium carbonate (OS-SYLVESTER) 1250 (500 Ca) MG tablet, Take 1 tablet by mouth 2 (two) times a day  , Disp: , Rfl:     Fesoterodine Fumarate ER (TOVIAZ) 8 MG TB24, Take 8 mg by mouth daily, Disp: , Rfl:     fluticasone (FLONASE) 50 mcg/act nasal spray, 1 spray into each nostril daily, Disp: , Rfl:     fluticasone furoate-vilanterol (BREO ELLIPTA), Inhale 1 puff daily, Disp: , Rfl:     levothyroxine 50 mcg tablet, Take 50 mcg by mouth daily, Disp: , Rfl:     loratadine (CLARITIN) 10 mg tablet, Take 10 mg by mouth daily, Disp: , Rfl:     Multiple Vitamin (MULTIVITAMIN) tablet, Take 1 tablet by mouth 2 (two) times a day, Disp: , Rfl:     omeprazole (PriLOSEC) 40 MG capsule, Take 40 mg by mouth 2 (two) times a day, Disp: , Rfl: 1    sertraline (ZOLOFT) 50 mg tablet, Take 50 mg by mouth daily, Disp: , Rfl:     simvastatin (ZOCOR) 40 mg tablet, Take 40 mg by mouth daily at bedtime, Disp: , Rfl:     temazepam (RESTORIL) 7 5 mg capsule, Take 15 mg by mouth daily at bedtime as needed for sleep, Disp: , Rfl:     valsartan (DIOVAN) 80 mg tablet, Take 80 mg by mouth daily, Disp: , Rfl:     amoxicillin-clavulanate (AUGMENTIN) 875-125 mg per tablet, Take 1 tablet by mouth every 12 (twelve) hours for 10 days, Disp: 20 tablet, Rfl: 0    Allergies   Allergen Reactions    Aspirin Other (See Comments)     erythema per [facility] order    Latex Rash    Neosporin [Neomycin-Bacitracin Zn-Polymyx] Rash and Other (See Comments)     hives per [facility] order          Lab Review   Appointment on 06/05/2018   Component Date Value    WBC 06/05/2018 8 17     RBC 06/05/2018 4 62     Hemoglobin 06/05/2018 11 0*    Hematocrit 06/05/2018 37 3     MCV 06/05/2018 81*    MCH 06/05/2018 23 8*    MCHC 06/05/2018 29 5*    RDW 06/05/2018 17 4*    MPV 06/05/2018 8 7*    Platelets 76/95/4995 398*    nRBC 06/05/2018 0     Neutrophils Relative 06/05/2018 55     Immat GRANS % 06/05/2018 1     Lymphocytes Relative 06/05/2018 25     Monocytes Relative 06/05/2018 11     Eosinophils Relative 06/05/2018 7*    Basophils Relative 06/05/2018 1     Neutrophils Absolute 06/05/2018 4 57     Immature Grans Absolute 06/05/2018 0 05     Lymphocytes Absolute 06/05/2018 2 04     Monocytes Absolute 06/05/2018 0 86     Eosinophils Absolute 06/05/2018 0 60     Basophils Absolute 06/05/2018 0 05     TSH 3RD GENERATON 06/05/2018 1 575     Magnesium 06/05/2018 2 0     Uric Acid 06/05/2018 5 0     Sodium 06/05/2018 141     Potassium 06/05/2018 4 2     Chloride 06/05/2018 102     CO2 06/05/2018 31     Anion Gap 06/05/2018 8     BUN 06/05/2018 19     Creatinine 06/05/2018 0 74     Glucose, Fasting 06/05/2018 92     Calcium 06/05/2018 9 9     AST 06/05/2018 20     ALT 06/05/2018 22     Alkaline Phosphatase 06/05/2018 81     Total Protein 06/05/2018 7 8     Albumin 06/05/2018 3 6     Total Bilirubin 06/05/2018 0 40     eGFR 06/05/2018 78     Sed Rate 06/05/2018 35*    Rheumatoid Factor 06/05/2018 Negative     BOO 06/05/2018 Negative     CRP 06/05/2018 24 6*    LYME AB IGG 06/05/2018 0 14     LYME AB IGM 06/05/2018 0 22     Cholesterol 06/05/2018 133     Triglycerides 06/05/2018 83     HDL, Direct 06/05/2018 65*    LDL Calculated 06/05/2018 51     Non-HDL-Chol (CHOL-HDL) 06/05/2018 68    Transcribe Orders on 06/05/2018   Component Date Value    Color, UA 06/05/2018 Yellow     Clarity, UA 06/05/2018 Clear     Specific Gravity, UA 06/05/2018 <=1 005     pH, UA 06/05/2018 6 0     Leukocytes, UA 06/05/2018 Negative     Nitrite, UA 06/05/2018 Negative     Protein, UA 06/05/2018 Negative     Glucose, UA 06/05/2018 Negative     Ketones, UA 06/05/2018 Negative     Urobilinogen, UA 06/05/2018 0 2     Bilirubin, UA 06/05/2018 Negative     Blood, UA 06/05/2018 Negative         Imaging: No results found  Objective:     Physical Exam      Patient Instructions    Will request and review old records from your previous physician  For now, take the Augmentin twice daily for the next 10 days  I also suggest you  align 4 mg as a probiotic to protect her stomach as this is the 2nd round of antibiotics you will be on  Your blood work looks perfect other than inflammatory changes  I would not change her medications at this time    Your blood pressure is perfect and your cholesterol is at goal  If there is no change in the head pressure at the f/u appt may consider ct or mri of the brain         MABEL Ruiz

## 2018-07-13 ENCOUNTER — OFFICE VISIT (OUTPATIENT)
Dept: CARDIOLOGY CLINIC | Facility: CLINIC | Age: 78
End: 2018-07-13
Payer: MEDICARE

## 2018-07-13 VITALS
OXYGEN SATURATION: 93 % | WEIGHT: 225.2 LBS | HEIGHT: 55 IN | SYSTOLIC BLOOD PRESSURE: 122 MMHG | DIASTOLIC BLOOD PRESSURE: 70 MMHG | HEART RATE: 70 BPM | BODY MASS INDEX: 52.12 KG/M2

## 2018-07-13 DIAGNOSIS — I35.0 AORTIC STENOSIS, MODERATE: Primary | ICD-10-CM

## 2018-07-13 DIAGNOSIS — G47.33 OSA (OBSTRUCTIVE SLEEP APNEA): ICD-10-CM

## 2018-07-13 DIAGNOSIS — I10 ESSENTIAL HYPERTENSION: ICD-10-CM

## 2018-07-13 DIAGNOSIS — I34.2 NON-RHEUMATIC MITRAL VALVE STENOSIS: ICD-10-CM

## 2018-07-13 DIAGNOSIS — E78.2 MIXED HYPERLIPIDEMIA: Chronic | ICD-10-CM

## 2018-07-13 DIAGNOSIS — I51.89 DIASTOLIC DYSFUNCTION: ICD-10-CM

## 2018-07-13 DIAGNOSIS — I27.20 PULMONARY HYPERTENSION (HCC): ICD-10-CM

## 2018-07-13 DIAGNOSIS — E66.01 MORBID OBESITY (HCC): ICD-10-CM

## 2018-07-13 DIAGNOSIS — I05.9 MITRAL ANNULAR CALCIFICATION: ICD-10-CM

## 2018-07-13 DIAGNOSIS — I51.7 LVH (LEFT VENTRICULAR HYPERTROPHY): ICD-10-CM

## 2018-07-13 DIAGNOSIS — I35.1 NONRHEUMATIC AORTIC VALVE INSUFFICIENCY: ICD-10-CM

## 2018-07-13 PROCEDURE — 99214 OFFICE O/P EST MOD 30 MIN: CPT | Performed by: INTERNAL MEDICINE

## 2018-07-13 NOTE — PROGRESS NOTES
Cardiology Follow Up    Wing Rodriguez  1940  [id number]  [address] [facility] [address]    1  Aortic stenosis, moderate     2  JANICE (obstructive sleep apnea)     3  Essential hypertension     4  Mixed hyperlipidemia     5  LVH (left ventricular hypertrophy)     6  Mitral annular calcification     7  Non-rheumatic mitral valve stenosis     8  Pulmonary hypertension ([address] )     9  Diastolic dysfunction     10  Morbid obesity ([facility] [address] )     11  Nonrheumatic aortic valve insufficiency       Chief Complaint   Patient presents with    Follow-up     routine         Interval History: Patient presents for routine follow-up  She reports feeling generally well, but has had recent issues with her left ear being red and inflamed  She was prescribed antibiotic for this which she has now completed  She denies ear pain or fever  She was diagnosed with sleep apnea and now uses CPAP  She does have history of pulmonary hypertension     Aortic stenosis, aortic insufficiency, mitral stenosis- follows with regular echo's  No complaints of chest pain, SOB, edema, orthopnea, syncope    Hypertension with LVH, DD -  Has had it for many years  Takes her medications regularly  Denies lightheadedness, headache, medication side effects  Hyperlipidemia-  Has had it for few years  Takes her medications regularly  Denies myalgia    Her PCP closely monitor the blood work     PHTN - from JANICE, valvular disease     morbid obesity -  Unable to lose weight due to inability to exercise      Patient Active Problem List   Diagnosis    GERD (gastroesophageal reflux disease)    Hypothyroid    Hypertension    Community acquired pneumonia of left lower lobe of lung ([facility] [address] )    Hyperlipidemia    Reactive airway disease without complication    JANICE (obstructive sleep apnea)    Aortic stenosis, moderate    Diastolic dysfunction  LVH (left ventricular hypertrophy)    Mitral annular calcification    Mitral valve stenosis    Morbid obesity (HCC)    Nonrheumatic aortic valve insufficiency    Pulmonary hypertension (HCC)     Past Medical History:   Diagnosis Date    Arthritis     Cardiac disease     "leaky valve"    Coronary artery disease     Disease of thyroid gland     Dislocation of right shoulder joint     Hypertension     Murmur, cardiac     Simple goiter     Skin cyst     within the armpits, right     Social History     Social History    Marital status: Single     Spouse name: N/A    Number of children: N/A    Years of education: N/A     Occupational History    Not on file       Social History Main Topics    Smoking status: Never Smoker    Smokeless tobacco: Never Used    Alcohol use No    Drug use: No    Sexual activity: No     Other Topics Concern    Not on file     Social History Narrative    Denied drinking coffee    Denied exercise habits          Family History   Problem Relation Age of Onset    Diabetes Mother     Stroke Mother     Cancer Father     Lung cancer Father     Diabetes Sister     Heart disease Sister     Coronary artery disease Family     Diabetes Family     Hypertension Family     Cancer Family     Stroke Family      Past Surgical History:   Procedure Laterality Date    BREAST BIOPSY      CARPAL TUNNEL RELEASE      CHOLECYSTECTOMY      DILATION AND CURETTAGE OF UTERUS      HYSTEROSCOPY      JOINT REPLACEMENT      b/l knee     JOINT REPLACEMENT  2007    left hip       Current Outpatient Prescriptions:     albuterol (2 5 mg/3 mL) 0 083 % nebulizer solution, Take 3 mL by nebulization every 6 (six) hours as needed for wheezing or shortness of breath, Disp: 75 mL, Rfl: 0    aspirin (ECOTRIN LOW STRENGTH) 81 mg EC tablet, Take 1 tablet by mouth daily, Disp: 30 tablet, Rfl: 0    b complex vitamins capsule, Take 1 capsule by mouth 2 (two) times a day  , Disp: , Rfl:     calcium carbonate (OS-SYLVESTER) 600 MG tablet, Take 1 tablet by mouth 2 (two) times a day  , Disp: , Rfl:     fluticasone (FLONASE) 50 mcg/act nasal spray, 1 spray into each nostril daily, Disp: , Rfl:     fluticasone furoate-vilanterol (BREO ELLIPTA), Inhale 1 puff daily, Disp: , Rfl:     levothyroxine 50 mcg tablet, Take 50 mcg by mouth daily, Disp: , Rfl:     loratadine (CLARITIN) 10 mg tablet, Take 10 mg by mouth daily, Disp: , Rfl:     omeprazole (PriLOSEC) 40 MG capsule, Take 40 mg by mouth 2 (two) times a day, Disp: , Rfl: 1    sertraline (ZOLOFT) 50 mg tablet, Take 50 mg by mouth daily, Disp: , Rfl:     simvastatin (ZOCOR) 40 mg tablet, Take 40 mg by mouth daily at bedtime, Disp: , Rfl:     temazepam (RESTORIL) 7 5 mg capsule, Take 15 mg by mouth daily at bedtime as needed for sleep, Disp: , Rfl:     valsartan (DIOVAN) 80 mg tablet, Take 80 mg by mouth daily, Disp: , Rfl:     amoxicillin-clavulanate (AUGMENTIN) 875-125 mg per tablet, Take 1 tablet by mouth every 12 (twelve) hours for 10 days, Disp: 20 tablet, Rfl: 0    Fesoterodine Fumarate ER (TOVIAZ) 8 MG TB24, Take 8 mg by mouth daily, Disp: , Rfl:     Multiple Vitamin (MULTIVITAMIN) tablet, Take 1 tablet by mouth 2 (two) times a day, Disp: , Rfl:   Allergies   Allergen Reactions    Aspirin Other (See Comments)     erythema per [facility] order    Latex Rash    Neosporin [Neomycin-Bacitracin Zn-Polymyx] Rash and Other (See Comments)     hives per [facility] order           Review of Systems:  Review of Systems   Constitutional: Negative for activity change, diaphoresis, fatigue, fever and unexpected weight change  HENT: Negative for nosebleeds and trouble swallowing  Eyes: Negative for visual disturbance  Respiratory: Negative for cough, chest tightness, shortness of breath and wheezing  Cardiovascular: Negative for chest pain, palpitations and leg swelling  Gastrointestinal: Negative for abdominal pain, anal bleeding, blood in stool and nausea  Endocrine: Negative for cold intolerance and heat intolerance  Genitourinary: Negative for decreased urine volume, frequency and hematuria  Musculoskeletal: Negative for myalgias  Skin: Negative for color change and wound  Neurological: Negative for dizziness, syncope, weakness, light-headedness and headaches  Psychiatric/Behavioral: Negative for sleep disturbance  The patient is not nervous/anxious  Physical Exam:  /70   Pulse 70   Ht 4' 7" (1 397 m)   Wt 102 kg (225 lb 3 2 oz)   SpO2 93%   BMI 52 34 kg/m²     Physical Exam   Constitutional: She is oriented to person, place, and time  She appears well-developed and well-nourished  No distress  HENT:   Head: Normocephalic and atraumatic  Eyes: Conjunctivae are normal  No scleral icterus  Neck: Neck supple  No JVD present  Cardiovascular: Normal rate and regular rhythm  Murmur heard  Grade 3/6 APOLINAR at base   Pulmonary/Chest: Effort normal and breath sounds normal  No respiratory distress  She has no wheezes  She has no rales  Abdominal: She exhibits no distension  There is no tenderness  Musculoskeletal: She exhibits no edema  Neurological: She is alert and oriented to person, place, and time  Skin: Skin is warm and dry  No rash noted  She is not diaphoretic  Psychiatric: She has a normal mood and affect  Discussion/Summary:    Echocardiogram 12/2017 EF 60%, mild concentric hypertrophy, grade 1 diastolic dysfunction, mild MS, mild to moderate MAC, mild AS (peak/mean gradient 52 and 32 respectively), estimated peak PA pressure 40 mmHg suggestive of mild pulmonary hypertension    Valvular disease-patient asymptomatic, continue to follow with serial echocardiograms  Signs and symptoms of valvular disease discussed    JANICE-diagnosed fairly recently  Patient now on CPAP for which patient seems to be compliant    She does evidence of mild pulmonary hypertension on echocardiogram   JANICE may explain this    Obesity-weight loss encouraged to improve cardiovascular risk profile    Hyperlipidemia-continue simvastatin    Hypertension-stable, continue present regimen    Recommend aggressive risk factor modification and therapeutic lifestyle changes  Low salt, low calorie, low fat, low cholesterol diet with regular exercise and to optimize weight  I will defer the ordering and monitoring of necessary lab studies to you, but I am available and happy to review and manage any of that data at your request in the future  Discussed concepts of atherosclerosis, including signs and symptoms of cardiac disease  Previous studies were reviewed  Safety measures were reviewed  Questions were entertained and answered  Patient was advised to report any problem requiring medical attention  Follow up with appropriate specialists and lab work as discussed  Return for follow up visit as scheduled or earlier, if needed  Further recommendations will be forthcoming after the above testing is performed and results available  Thank you for allowing me to participate in the care and evaluation of your patient  Should you have any questions, please feel free to contact me

## 2018-07-17 ENCOUNTER — OFFICE VISIT (OUTPATIENT)
Dept: FAMILY MEDICINE CLINIC | Facility: CLINIC | Age: 78
End: 2018-07-17
Payer: MEDICARE

## 2018-07-17 VITALS
SYSTOLIC BLOOD PRESSURE: 120 MMHG | HEIGHT: 55 IN | DIASTOLIC BLOOD PRESSURE: 80 MMHG | TEMPERATURE: 99.1 F | BODY MASS INDEX: 51.84 KG/M2 | RESPIRATION RATE: 18 BRPM | OXYGEN SATURATION: 96 % | HEART RATE: 74 BPM | WEIGHT: 224 LBS

## 2018-07-17 DIAGNOSIS — H66.3X2 CHRONIC SUPPURATIVE OTITIS MEDIA OF LEFT EAR, UNSPECIFIED OTITIS MEDIA LOCATION: Primary | ICD-10-CM

## 2018-07-17 DIAGNOSIS — M25.511 ACUTE PAIN OF RIGHT SHOULDER: ICD-10-CM

## 2018-07-17 PROCEDURE — 99214 OFFICE O/P EST MOD 30 MIN: CPT | Performed by: FAMILY MEDICINE

## 2018-07-17 RX ORDER — TEMAZEPAM 15 MG/1
15 CAPSULE ORAL DAILY
Refills: 1 | COMMUNITY
Start: 2018-07-02 | End: 2019-01-24 | Stop reason: SDUPTHER

## 2018-07-17 RX ORDER — AMOXICILLIN AND CLAVULANATE POTASSIUM 875; 125 MG/1; MG/1
1 TABLET, FILM COATED ORAL EVERY 12 HOURS SCHEDULED
Qty: 20 TABLET | Refills: 0 | Status: SHIPPED | OUTPATIENT
Start: 2018-07-17 | End: 2018-07-27

## 2018-07-17 NOTE — PATIENT INSTRUCTIONS
Will refer to ENT in this area as the left ear is no better  Will continue to treat with antibiotics until you have the CT scan done of your temporal bones  Will refer to Dr Sera Whittington to take 400 mg of Advil with 2 Tylenol every 8 hr as needed for the pain in the shoulder  If the pain is no better we may have to get x-rays and start all over again or consider an injection of steroids in the arm  Follow up here after you see Dr Sera Christie

## 2018-07-17 NOTE — PROGRESS NOTES
Assessment/Plan:     Chronic Problems:  No problem-specific Assessment & Plan notes found for this encounter  Visit Diagnosis:  Diagnoses and all orders for this visit:    Chronic suppurative otitis media of left ear, unspecified otitis media location  Comments:  Pt has been having problems with the left tm for years  Will get ct of mastoid  Orders:  -     CT orbits/temporal bones/skull base wo contrast; Future  -     amoxicillin-clavulanate (AUGMENTIN) 875-125 mg per tablet; Take 1 tablet by mouth every 12 (twelve) hours for 10 days  -     Ambulatory Referral to Otolaryngology; Future    Acute pain of right shoulder  Comments:  Given the option of physical therapy  Would prefer to wait  Advised to take advil with tylenol     Other orders  -     temazepam (RESTORIL) 15 mg capsule; Take 15 mg by mouth daily          Subjective:    Patient ID: Jaky Adams is a 65 y o  female  Still with left ear pain  Finished the antibiotic and still with pain  Was following with Dr Mary Lemus but not happy with him and wants to see someone else  Also c/o pain right shoulder  H/O multiple dislocations and surgery  No h/o reinjury  Started hurting again on Friday  Took excedrin for the pain this am  Minimal relief  Takes all other meds as directed  No side effects noted            The following portions of the patient's history were reviewed and updated as appropriate: allergies, current medications, past family history, past medical history, past social history, past surgical history and problem list     Review of Systems   Constitutional: Negative for chills, diaphoresis, fatigue and fever  HENT: Positive for ear pain  Negative for sinus pressure, sneezing and sore throat  Eyes: Negative  Respiratory: Positive for shortness of breath  Negative for cough and stridor  Uses cpap at bedtime  Cardiovascular: Negative for chest pain and palpitations  Gastrointestinal: Negative  Genitourinary: Negative  Musculoskeletal: Positive for arthralgias (shoulder pains as per hpi  )  Negative for myalgias  Neurological: Positive for headaches (but does not know how to describe this  Thinks it is coming from here ear, )  Negative for dizziness and numbness  Psychiatric/Behavioral: Negative for dysphoric mood  The patient is not nervous/anxious  /80   Pulse 74   Temp 99 1 °F (37 3 °C)   Resp 18   Ht 4' 7" (1 397 m)   Wt 102 kg (224 lb)   SpO2 96%   BMI 52 06 kg/m²   Social History     Social History    Marital status: Single     Spouse name: N/A    Number of children: N/A    Years of education: N/A     Occupational History    Not on file       Social History Main Topics    Smoking status: Never Smoker    Smokeless tobacco: Never Used    Alcohol use No    Drug use: No    Sexual activity: No     Other Topics Concern    Not on file     Social History Narrative    Denied drinking coffee    Denied exercise habits         Past Medical History:   Diagnosis Date    Arthritis     Cardiac disease     "leaky valve"    Coronary artery disease     Disease of thyroid gland     Dislocation of right shoulder joint     Hypertension     Murmur, cardiac     Simple goiter     Skin cyst     within the armpits, right     Family History   Problem Relation Age of Onset    Diabetes Mother     Stroke Mother     Cancer Father     Lung cancer Father     Diabetes Sister     Heart disease Sister     Coronary artery disease Family     Diabetes Family     Hypertension Family     Cancer Family     Stroke Family      Past Surgical History:   Procedure Laterality Date    BREAST BIOPSY      CARPAL TUNNEL RELEASE      CHOLECYSTECTOMY      DILATION AND CURETTAGE OF UTERUS      HYSTEROSCOPY      JOINT REPLACEMENT      b/l knee     JOINT REPLACEMENT  2007    left hip       Current Outpatient Prescriptions:     albuterol (2 5 mg/3 mL) 0 083 % nebulizer solution, Take 3 mL by nebulization every 6 (six) hours as needed for wheezing or shortness of breath, Disp: 75 mL, Rfl: 0    amoxicillin-clavulanate (AUGMENTIN) 875-125 mg per tablet, Take 1 tablet by mouth every 12 (twelve) hours for 10 days, Disp: 20 tablet, Rfl: 0    aspirin (ECOTRIN LOW STRENGTH) 81 mg EC tablet, Take 1 tablet by mouth daily, Disp: 30 tablet, Rfl: 0    b complex vitamins capsule, Take 1 capsule by mouth 2 (two) times a day  , Disp: , Rfl:     calcium carbonate (OS-SYLVESTER) 600 MG tablet, Take 1 tablet by mouth 2 (two) times a day  , Disp: , Rfl:     Fesoterodine Fumarate ER (TOVIAZ) 8 MG TB24, Take 8 mg by mouth daily, Disp: , Rfl:     fluticasone (FLONASE) 50 mcg/act nasal spray, 1 spray into each nostril daily, Disp: , Rfl:     fluticasone furoate-vilanterol (BREO ELLIPTA), Inhale 1 puff daily, Disp: , Rfl:     levothyroxine 50 mcg tablet, Take 50 mcg by mouth daily, Disp: , Rfl:     loratadine (CLARITIN) 10 mg tablet, Take 10 mg by mouth daily, Disp: , Rfl:     Multiple Vitamin (MULTIVITAMIN) tablet, Take 1 tablet by mouth 2 (two) times a day, Disp: , Rfl:     omeprazole (PriLOSEC) 40 MG capsule, Take 40 mg by mouth 2 (two) times a day, Disp: , Rfl: 1    sertraline (ZOLOFT) 50 mg tablet, Take 50 mg by mouth daily, Disp: , Rfl:     simvastatin (ZOCOR) 40 mg tablet, Take 40 mg by mouth daily at bedtime, Disp: , Rfl:     temazepam (RESTORIL) 15 mg capsule, Take 15 mg by mouth daily, Disp: , Rfl: 1    valsartan (DIOVAN) 80 mg tablet, Take 80 mg by mouth daily, Disp: , Rfl:     Allergies   Allergen Reactions    Aspirin Other (See Comments)     erythema per [facility] order    Latex Rash    Neosporin [Neomycin-Bacitracin Zn-Polymyx] Rash and Other (See Comments)     hives per [facility] order          Lab Review   Appointment on 06/05/2018   Component Date Value    WBC 06/05/2018 8 17     RBC 06/05/2018 4 62     Hemoglobin 06/05/2018 11 0*    Hematocrit 06/05/2018 37 3     MCV 06/05/2018 81*    MCH 06/05/2018 23 8*    MCHC 06/05/2018 29 5*    RDW 06/05/2018 17 4*    MPV 06/05/2018 8 7*    Platelets 21/55/8024 398*    nRBC 06/05/2018 0     Neutrophils Relative 06/05/2018 55     Immat GRANS % 06/05/2018 1     Lymphocytes Relative 06/05/2018 25     Monocytes Relative 06/05/2018 11     Eosinophils Relative 06/05/2018 7*    Basophils Relative 06/05/2018 1     Neutrophils Absolute 06/05/2018 4 57     Immature Grans Absolute 06/05/2018 0 05     Lymphocytes Absolute 06/05/2018 2 04     Monocytes Absolute 06/05/2018 0 86     Eosinophils Absolute 06/05/2018 0 60     Basophils Absolute 06/05/2018 0 05     TSH 3RD GENERATON 06/05/2018 1 575     Magnesium 06/05/2018 2 0     Uric Acid 06/05/2018 5 0     Sodium 06/05/2018 141     Potassium 06/05/2018 4 2     Chloride 06/05/2018 102     CO2 06/05/2018 31     Anion Gap 06/05/2018 8     BUN 06/05/2018 19     Creatinine 06/05/2018 0 74     Glucose, Fasting 06/05/2018 92     Calcium 06/05/2018 9 9     AST 06/05/2018 20     ALT 06/05/2018 22     Alkaline Phosphatase 06/05/2018 81     Total Protein 06/05/2018 7 8     Albumin 06/05/2018 3 6     Total Bilirubin 06/05/2018 0 40     eGFR 06/05/2018 78     Sed Rate 06/05/2018 35*    Rheumatoid Factor 06/05/2018 Negative     BOO 06/05/2018 Negative     CRP 06/05/2018 24 6*    LYME AB IGG 06/05/2018 0 14     LYME AB IGM 06/05/2018 0 22     Cholesterol 06/05/2018 133     Triglycerides 06/05/2018 83     HDL, Direct 06/05/2018 65*    LDL Calculated 06/05/2018 51     Non-HDL-Chol (CHOL-HDL) 06/05/2018 68    Transcribe Orders on 06/05/2018   Component Date Value    Color, UA 06/05/2018 Yellow     Clarity, UA 06/05/2018 Clear     Specific Gravity, UA 06/05/2018 <=1 005     pH, UA 06/05/2018 6 0     Leukocytes, UA 06/05/2018 Negative     Nitrite, UA 06/05/2018 Negative     Protein, UA 06/05/2018 Negative     Glucose, UA 06/05/2018 Negative     Ketones, UA 06/05/2018 Negative     Urobilinogen, UA 06/05/2018 0 2     Bilirubin,  06/05/2018 Negative     Blood,  06/05/2018 Negative         Imaging: No results found  Objective:     Physical Exam   Constitutional: She is oriented to person, place, and time  She appears well-developed and well-nourished  No distress  HENT:   Head: Normocephalic and atraumatic  Right Ear: External ear normal    Left ear with ? Perforation  Pt feels this makes her head not feel right  Cardiovascular: Normal rate and regular rhythm     + Systolic murmur 2/6   Pulmonary/Chest: Effort normal and breath sounds normal  No respiratory distress  She has no wheezes  She has no rales  Musculoskeletal: Normal range of motion  She exhibits tenderness (some tenderness to the right posterior ac joint space  Full rom at this time  )  She exhibits no edema  Neurological: She is alert and oriented to person, place, and time  No cranial nerve deficit  Skin: She is not diaphoretic  Psychiatric: She has a normal mood and affect  Her behavior is normal  Judgment and thought content normal          Patient Instructions   Will refer to ENT in this area as the left ear is no better  Will continue to treat with antibiotics until you have the CT scan done of your temporal bones  Will refer to Dr Esther Whittington to take 400 mg of Advil with 2 Tylenol every 8 hr as needed for the pain in the shoulder  If the pain is no better we may have to get x-rays and start all over again or consider an injection of steroids in the arm  Follow up here after you see MABEL Love

## 2018-07-19 ENCOUNTER — HOSPITAL ENCOUNTER (OUTPATIENT)
Dept: CT IMAGING | Facility: HOSPITAL | Age: 78
Discharge: HOME | End: 2018-07-19
Payer: MEDICARE

## 2018-07-19 ENCOUNTER — LAB (OUTPATIENT)
Dept: LAB | Facility: HOSPITAL | Age: 78
End: 2018-07-19
Payer: MEDICARE

## 2018-07-19 DIAGNOSIS — H66.3X2 CHRONIC SUPPURATIVE OTITIS MEDIA OF LEFT EAR, UNSPECIFIED OTITIS MEDIA LOCATION: ICD-10-CM

## 2018-07-19 DIAGNOSIS — Z12.11 SCREENING FOR MALIGNANT NEOPLASM OF COLON: ICD-10-CM

## 2018-07-19 LAB — HEMOCCULT STL QL IA: POSITIVE

## 2018-07-19 PROCEDURE — 70480 CT ORBIT/EAR/FOSSA W/O DYE: CPT

## 2018-07-19 PROCEDURE — G0328 FECAL BLOOD SCRN IMMUNOASSAY: HCPCS

## 2018-07-24 DIAGNOSIS — R19.5 HEME POSITIVE STOOL: Primary | ICD-10-CM

## 2018-08-13 ENCOUNTER — APPOINTMENT (OUTPATIENT)
Dept: LAB | Facility: HOSPITAL | Age: 78
DRG: 286 | End: 2018-08-13
Payer: MEDICARE

## 2018-08-13 ENCOUNTER — OFFICE VISIT (OUTPATIENT)
Dept: FAMILY MEDICINE CLINIC | Facility: CLINIC | Age: 78
End: 2018-08-13
Payer: MEDICARE

## 2018-08-13 ENCOUNTER — HOSPITAL ENCOUNTER (INPATIENT)
Facility: HOSPITAL | Age: 78
LOS: 4 days | Discharge: HOME HEALTH | DRG: 286 | End: 2018-08-17
Attending: EMERGENCY MEDICINE | Admitting: INTERNAL MEDICINE
Payer: MEDICARE

## 2018-08-13 ENCOUNTER — APPOINTMENT (EMERGENCY)
Dept: CT IMAGING | Facility: HOSPITAL | Age: 78
DRG: 286 | End: 2018-08-13
Payer: MEDICARE

## 2018-08-13 ENCOUNTER — TELEPHONE (OUTPATIENT)
Dept: FAMILY MEDICINE CLINIC | Facility: CLINIC | Age: 78
End: 2018-08-13

## 2018-08-13 ENCOUNTER — HOSPITAL ENCOUNTER (OUTPATIENT)
Dept: RADIOLOGY | Facility: HOSPITAL | Age: 78
Discharge: HOME | DRG: 286 | End: 2018-08-13
Payer: MEDICARE

## 2018-08-13 ENCOUNTER — TELEPHONE (OUTPATIENT)
Dept: CARDIOLOGY CLINIC | Facility: CLINIC | Age: 78
End: 2018-08-13

## 2018-08-13 VITALS
OXYGEN SATURATION: 93 % | HEIGHT: 55 IN | RESPIRATION RATE: 18 BRPM | WEIGHT: 224.4 LBS | BODY MASS INDEX: 51.93 KG/M2 | SYSTOLIC BLOOD PRESSURE: 140 MMHG | TEMPERATURE: 99 F | DIASTOLIC BLOOD PRESSURE: 70 MMHG | HEART RATE: 72 BPM

## 2018-08-13 DIAGNOSIS — I10 ESSENTIAL HYPERTENSION: ICD-10-CM

## 2018-08-13 DIAGNOSIS — R06.02 SHORTNESS OF BREATH: ICD-10-CM

## 2018-08-13 DIAGNOSIS — E66.01 MORBID OBESITY (HCC): ICD-10-CM

## 2018-08-13 DIAGNOSIS — R06.00 DYSPNEA ON EXERTION: Primary | ICD-10-CM

## 2018-08-13 DIAGNOSIS — I35.0 AORTIC STENOSIS, MODERATE: Primary | ICD-10-CM

## 2018-08-13 DIAGNOSIS — I35.0 AORTIC STENOSIS, MODERATE: ICD-10-CM

## 2018-08-13 DIAGNOSIS — R09.02 HYPOXIA: ICD-10-CM

## 2018-08-13 DIAGNOSIS — H74.92 DISORDER OF LEFT MASTOID: ICD-10-CM

## 2018-08-13 LAB
ALBUMIN SERPL BCP-MCNC: 3.3 G/DL (ref 3.5–5)
ALP SERPL-CCNC: 86 U/L (ref 46–116)
ALT SERPL W P-5'-P-CCNC: 18 U/L (ref 12–78)
ANION GAP SERPL CALCULATED.3IONS-SCNC: 8 MMOL/L (ref 4–13)
AST SERPL W P-5'-P-CCNC: 18 U/L (ref 5–45)
BASOPHILS # BLD AUTO: 0.07 THOUSANDS/ΜL (ref 0–0.1)
BASOPHILS NFR BLD AUTO: 1 % (ref 0–1)
BILIRUB SERPL-MCNC: 0.2 MG/DL (ref 0.2–1)
BUN SERPL-MCNC: 22 MG/DL (ref 5–25)
CALCIUM SERPL-MCNC: 9.8 MG/DL (ref 8.3–10.1)
CHLORIDE SERPL-SCNC: 104 MMOL/L (ref 100–108)
CO2 SERPL-SCNC: 29 MMOL/L (ref 21–32)
CREAT SERPL-MCNC: 0.75 MG/DL (ref 0.6–1.3)
DEPRECATED D DIMER PPP: 1431 NG/ML (FEU) (ref 0–424)
EOSINOPHIL # BLD AUTO: 0.71 THOUSAND/ΜL (ref 0–0.61)
EOSINOPHIL NFR BLD AUTO: 8 % (ref 0–6)
ERYTHROCYTE [DISTWIDTH] IN BLOOD BY AUTOMATED COUNT: 18.8 % (ref 11.6–15.1)
GFR SERPL CREATININE-BSD FRML MDRD: 77 ML/MIN/1.73SQ M
GLUCOSE SERPL-MCNC: 94 MG/DL (ref 65–140)
HCT VFR BLD AUTO: 32.9 % (ref 34.8–46.1)
HGB BLD-MCNC: 9.6 G/DL (ref 11.5–15.4)
IMM GRANULOCYTES # BLD AUTO: 0.04 THOUSAND/UL (ref 0–0.2)
IMM GRANULOCYTES NFR BLD AUTO: 0 % (ref 0–2)
LYMPHOCYTES # BLD AUTO: 2 THOUSANDS/ΜL (ref 0.6–4.47)
LYMPHOCYTES NFR BLD AUTO: 22 % (ref 14–44)
MCH RBC QN AUTO: 22.9 PG (ref 26.8–34.3)
MCHC RBC AUTO-ENTMCNC: 29.2 G/DL (ref 31.4–37.4)
MCV RBC AUTO: 79 FL (ref 82–98)
MONOCYTES # BLD AUTO: 1.03 THOUSAND/ΜL (ref 0.17–1.22)
MONOCYTES NFR BLD AUTO: 11 % (ref 4–12)
NEUTROPHILS # BLD AUTO: 5.35 THOUSANDS/ΜL (ref 1.85–7.62)
NEUTS SEG NFR BLD AUTO: 58 % (ref 43–75)
NRBC BLD AUTO-RTO: 0 /100 WBCS
NT-PROBNP SERPL-MCNC: 105 PG/ML
NT-PROBNP SERPL-MCNC: 207 PG/ML
PLATELET # BLD AUTO: 426 THOUSANDS/UL (ref 149–390)
PMV BLD AUTO: 8.8 FL (ref 8.9–12.7)
POTASSIUM SERPL-SCNC: 4.5 MMOL/L (ref 3.5–5.3)
PROT SERPL-MCNC: 7.6 G/DL (ref 6.4–8.2)
RBC # BLD AUTO: 4.19 MILLION/UL (ref 3.81–5.12)
SODIUM SERPL-SCNC: 141 MMOL/L (ref 136–145)
TROPONIN I SERPL-MCNC: <0.02 NG/ML
WBC # BLD AUTO: 9.2 THOUSAND/UL (ref 4.31–10.16)

## 2018-08-13 PROCEDURE — 80053 COMPREHEN METABOLIC PANEL: CPT

## 2018-08-13 PROCEDURE — 84484 ASSAY OF TROPONIN QUANT: CPT | Performed by: PHYSICIAN ASSISTANT

## 2018-08-13 PROCEDURE — 93005 ELECTROCARDIOGRAM TRACING: CPT

## 2018-08-13 PROCEDURE — 71046 X-RAY EXAM CHEST 2 VIEWS: CPT

## 2018-08-13 PROCEDURE — 36415 COLL VENOUS BLD VENIPUNCTURE: CPT

## 2018-08-13 PROCEDURE — 83880 ASSAY OF NATRIURETIC PEPTIDE: CPT | Performed by: PHYSICIAN ASSISTANT

## 2018-08-13 PROCEDURE — 99214 OFFICE O/P EST MOD 30 MIN: CPT | Performed by: FAMILY MEDICINE

## 2018-08-13 PROCEDURE — 71275 CT ANGIOGRAPHY CHEST: CPT

## 2018-08-13 PROCEDURE — 85379 FIBRIN DEGRADATION QUANT: CPT

## 2018-08-13 PROCEDURE — 83880 ASSAY OF NATRIURETIC PEPTIDE: CPT

## 2018-08-13 PROCEDURE — 85025 COMPLETE CBC W/AUTO DIFF WBC: CPT

## 2018-08-13 RX ORDER — LOSARTAN POTASSIUM 50 MG/1
25 TABLET ORAL DAILY
Qty: 30 TABLET | Refills: 3 | Status: SHIPPED | OUTPATIENT
Start: 2018-08-13 | End: 2018-09-26

## 2018-08-13 RX ORDER — FLUTICASONE FUROATE AND VILANTEROL 100; 25 UG/1; UG/1
1 POWDER RESPIRATORY (INHALATION) DAILY
Qty: 1 INHALER | Refills: 0 | Status: SHIPPED | OUTPATIENT
Start: 2018-08-13 | End: 2018-08-23

## 2018-08-13 RX ORDER — LEVOCETIRIZINE DIHYDROCHLORIDE 5 MG/1
TABLET, FILM COATED ORAL
Refills: 0 | COMMUNITY
Start: 2018-07-30 | End: 2018-08-17 | Stop reason: HOSPADM

## 2018-08-13 RX ADMIN — IOHEXOL 100 ML: 350 INJECTION, SOLUTION INTRAVENOUS at 21:50

## 2018-08-13 NOTE — PROGRESS NOTES
Assessment/Plan:     Chronic Problems:  No problem-specific Assessment & Plan notes found for this encounter  Visit Diagnosis:  Diagnoses and all orders for this visit:    Aortic stenosis, moderate  Comments:  keep the f/u with Dr Rubina Herzog  Orders:  -     XR chest pa & lateral; Future    Shortness of breath  Comments: Will get cxr and advised to keep the f/u with Dr Merari Goldstein  Restart the breo until seen by her  EKG done today -> nsr with flat t waves in the inferior leads  Orders:  -     XR chest pa & lateral; Future  -     CBC and differential; Future  -     Comprehensive metabolic panel; Future  -     D-dimer, quantitative; Future  -     NT-BNP PRO; Future  -     fluticasone-vilanterol (BREO ELLIPTA) 100-25 mcg/inh inhaler; Inhale 1 puff daily    Morbid obesity (HCC)  Comments: Will try to work on the weight when she is feeling better  For now cut back on portions of food  Disorder of left mastoid  Comments:  need to get back in to see Dr Larry Hernadez  Not much improvement in the left ear, Despite multiple antibiotics  Other orders  -     levocetirizine (XYZAL) 5 MG tablet; TAKE 1 TABLET BY MOUTH EVERY DAY AT NIGHT          Subjective:    Patient ID: Hernan Cartagena is a 65 y o  female  Pt is here for sob  Seems to be worse over the past couple of weeks  At the dentist and was sob while walking to the chair  Can't walk without sob  No chest pains  Pt was never a smoker  Follows with Dr Rubina Herzog  Never seen by him for sob  No cough  Pt denies any pains but feels weak in the legs when she walks  Feels she is upset as she has to have more teeth pulled, has f/u for a colonoscopy  Not sure if this is stress related  No new med changed  Seen by ent, and was told she needs another f/u appt and if the ear does not clear up she will need surgery  Pt also has f/u with Dr Merari Goldstein  Has been off breo for a "while now"  H/O pneumonia  He put her on levocetirizine and use nasal spray  Takes all other meds as directed  No side effects noted  The following portions of the patient's history were reviewed and updated as appropriate: allergies, current medications, past family history, past medical history, past social history, past surgical history and problem list     Review of Systems   Constitutional: Negative for chills, diaphoresis, fatigue and fever  HENT: Positive for ear pain (still with ear pain on the left  Follows with Dr Yas Marshall  ) and rhinorrhea  Negative for sore throat  Respiratory: Positive for shortness of breath and wheezing (but not now  )  Negative for cough  Cardiovascular: Negative for chest pain and palpitations  Gastrointestinal: Negative  Genitourinary: Negative  Neurological: Negative for dizziness, light-headedness and headaches  Still feels she has pressure in the head and tinnitus  Psychiatric/Behavioral: Positive for dysphoric mood  The patient is nervous/anxious  Worries about her health, her sister, and her dog  /70   Pulse 72   Temp 99 °F (37 2 °C) (Tympanic)   Resp 18   Ht 4' 7" (1 397 m)   Wt 102 kg (224 lb 6 4 oz)   SpO2 93% Comment: started off at 96%  BMI 52 16 kg/m²   Social History     Social History    Marital status: Single     Spouse name: N/A    Number of children: N/A    Years of education: N/A     Occupational History    Not on file       Social History Main Topics    Smoking status: Never Smoker    Smokeless tobacco: Never Used    Alcohol use No    Drug use: No    Sexual activity: No     Other Topics Concern    Not on file     Social History Narrative    Denied drinking coffee    Denied exercise habits         Past Medical History:   Diagnosis Date    Arthritis     Cardiac disease     "leaky valve"    Coronary artery disease     Disease of thyroid gland     Dislocation of right shoulder joint     Hypertension     Murmur, cardiac     Simple goiter     Skin cyst     within the armpits, right     Family History Problem Relation Age of Onset    Diabetes Mother     Stroke Mother     Cancer Father     Lung cancer Father     Diabetes Sister     Heart disease Sister     Coronary artery disease Family     Diabetes Family     Hypertension Family     Cancer Family     Stroke Family      Past Surgical History:   Procedure Laterality Date    BREAST BIOPSY      CARPAL TUNNEL RELEASE      CHOLECYSTECTOMY      DILATION AND CURETTAGE OF UTERUS      HYSTEROSCOPY      JOINT REPLACEMENT      b/l knee     JOINT REPLACEMENT  2007    left hip       Current Outpatient Prescriptions:     albuterol (2 5 mg/3 mL) 0 083 % nebulizer solution, Take 3 mL by nebulization every 6 (six) hours as needed for wheezing or shortness of breath, Disp: 75 mL, Rfl: 0    aspirin (ECOTRIN LOW STRENGTH) 81 mg EC tablet, Take 1 tablet by mouth daily, Disp: 30 tablet, Rfl: 0    b complex vitamins capsule, Take 1 capsule by mouth 2 (two) times a day  , Disp: , Rfl:     calcium carbonate (OS-SYLVESTER) 600 MG tablet, Take 1 tablet by mouth 2 (two) times a day  , Disp: , Rfl:     Fesoterodine Fumarate ER (TOVIAZ) 8 MG TB24, Take 8 mg by mouth daily, Disp: , Rfl:     fluticasone (FLONASE) 50 mcg/act nasal spray, 1 spray into each nostril daily, Disp: , Rfl:     fluticasone-vilanterol (BREO ELLIPTA) 100-25 mcg/inh inhaler, Inhale 1 puff daily, Disp: 1 Inhaler, Rfl: 0    levocetirizine (XYZAL) 5 MG tablet, TAKE 1 TABLET BY MOUTH EVERY DAY AT NIGHT, Disp: , Rfl: 0    levothyroxine 50 mcg tablet, Take 50 mcg by mouth daily, Disp: , Rfl:     loratadine (CLARITIN) 10 mg tablet, Take 10 mg by mouth daily, Disp: , Rfl:     Multiple Vitamin (MULTIVITAMIN) tablet, Take 1 tablet by mouth 2 (two) times a day, Disp: , Rfl:     omeprazole (PriLOSEC) 40 MG capsule, Take 40 mg by mouth 2 (two) times a day, Disp: , Rfl: 1    sertraline (ZOLOFT) 50 mg tablet, Take 50 mg by mouth daily, Disp: , Rfl:     simvastatin (ZOCOR) 40 mg tablet, Take 40 mg by mouth daily at bedtime, Disp: , Rfl:     temazepam (RESTORIL) 15 mg capsule, Take 15 mg by mouth daily, Disp: , Rfl: 1    valsartan (DIOVAN) 80 mg tablet, Take 80 mg by mouth daily, Disp: , Rfl:     Allergies   Allergen Reactions    Aspirin Other (See Comments)     erythema per [facility] order    Latex Rash    Neosporin [Neomycin-Bacitracin Zn-Polymyx] Rash and Other (See Comments)     hives per [facility] order          Lab Review   Lab on 07/19/2018   Component Date Value    OCCULT BLD, FECAL IMMUNO* 07/19/2018 Positive*        Imaging: Ct Orbits/temporal Bones/skull Base Wo Contrast    Result Date: 7/20/2018  Narrative: CT TEMPORAL BONES INDICATION:   H66 3X2: Other chronic suppurative otitis media, left ear  COMPARISON:  None  TECHNIQUE: Using a multi-detector scanner, 0 625 mm axial scans of the temporal bone were acquired using a high-resolution bone technique  The scans were retrospectively targeted for right and left side, and subsequently reconstructed in the coronal plane, again targeting the right and left sides individually, as well as the entire skull base  Both axial and coronal images were reviewed  Radiation dose length product (DLP) for this visit:  733 mGy-cm   This examination, like all CT scans performed in the [facility], was performed utilizing techniques to minimize radiation dose exposure, including the use of iterative reconstruction and automated exposure control  IMAGE QUALITY:  Diagnostic   FINDINGS: RIGHT TEMPORAL BONE: MIDDLE EAR: Normal  OSSICLES:Normal  COCHLEA: Normal  VESTIBULE: Normal  VESTIBULAR AND COCHLEAR AQUEDUCT: Normal FACIAL NERVE CANAL: Normal  SEMICIRCULAR CANALS: Normal  INTERNAL AUDITORY CANAL: Normal  EXTERNAL AUDITORY CANAL: Normal  CAROTID CANAL: Normal  JUGULAR FORAMEN: Normal  TEMPOROMANDIBULAR JOINT: Normal  MASTOID AIR CELLS: Normal  PERIAURICULAR SOFT TISSUES:  Normal  LEFT TEMPORAL BONE: MIDDLE EAR: There is extensive opacification of the mesotympanum and hypotympanum of the middle ear, deep to the tympanic membrane  This partially surrounds the ossicles  No erosion of the scutum  OSSICLES: Normal  COCHLEA: Normal  VESTIBULE: Normal  VESTIBULAR AND COCHLEAR AQUEDUCT: Normal FACIAL NERVE CANAL: Normal  SEMICIRCULAR CANALS: Normal  INTERNAL AUDITORY CANAL: Normal  EXTERNAL AUDITORY CANAL: Normal  CAROTID CANAL: Normal  JUGULAR FORAMEN: Normal  TEMPOROMANDIBULAR JOINT: Normal  MASTOID AIR CELLS: Extensive opacification of the mastoid air cells without any erosion or destruction of the mastoid septations or mastoid tip  PERIAURICULAR SOFT TISSUES:  Normal  OTHER FINDINGS:  None  Impression: Extensive opacification of the left mastoid air cells consistent with large effusion  There is partial opacification of the middle ear involving the mesotympanum and hypotympanum  No bony erosion or destruction  Workstation performed: [id number]       Objective:     Physical Exam   Constitutional: She is oriented to person, place, and time  She appears well-developed and well-nourished  No distress  HENT:   Head: Normocephalic and atraumatic  Right Ear: External ear normal    Mouth/Throat: Oropharynx is clear and moist    Left ear still injected, but appears better than previously  Eyes: Conjunctivae and EOM are normal  Pupils are equal, round, and reactive to light  Right eye exhibits no discharge  Left eye exhibits no discharge  Neck: Normal range of motion  Neck supple  Cardiovascular: Normal rate and regular rhythm  Murmur (Grade 3/6 systolic murmur) heard  Pulmonary/Chest: Effort normal and breath sounds normal  No respiratory distress  She has no wheezes  She has no rales  Abdominal: Soft  Bowel sounds are normal    Abdomen is morbidly pendulous  Musculoskeletal:   Ambulates with a walker  Lymphadenopathy:     She has no cervical adenopathy  Neurological: She is alert and oriented to person, place, and time  No cranial nerve deficit     Skin: Skin is warm and dry  She is not diaphoretic  Psychiatric: Her behavior is normal  Judgment and thought content normal    Flat affect  Appears sad  Patient Instructions   EKG done today -> normal sinus rhythm with no ectopy ; Flat t waves in inferior leads  Make an appt with Dr Rosette Graham for f/u as you have a h/o aortic stenosis  Have the chest xray done  Restart the breo until seen by Dr Trey Lira  Ok to use the nebulizer to see if this helps the breathing  Have the labs done  You need to get back in with Dr Dean Dural  The ear is not much improved  F/U here in 1 week  If the breathing is worse, you need the er         MABEL Gonzalez

## 2018-08-13 NOTE — TELEPHONE ENCOUNTER
S/w pt, stated that she has been having SOB on exertion for a couple of weeks and that she went to her PCP and they told her to contact her cardiologist  Pt states that she is not retaining any fluid or having chest pain  Please advise

## 2018-08-13 NOTE — TELEPHONE ENCOUNTER
Spoke with patient per Felicitas Rodarte about her D-Dimer being elevated  She need a stat ct-scan so we are sending her to the ED for the Ct Scan and sob   Patient is aware and is gong straight to the [facility] ED

## 2018-08-13 NOTE — PATIENT INSTRUCTIONS
EKG done today -> normal sinus rhythm with no ectopy ; Flat t waves in inferior leads  Make an appt with Dr Bernabe Closs for f/u as you have a h/o aortic stenosis  Have the chest xray done  Restart the breo until seen by Dr Steven Alejo to use the nebulizer to see if this helps the breathing  Have the labs done  You need to get back in with Dr Wayne Carson  The ear is not much improved  F/U here in 1 week  If the breathing is worse, you need the er

## 2018-08-14 ENCOUNTER — APPOINTMENT (INPATIENT)
Dept: NON INVASIVE DIAGNOSTICS | Facility: HOSPITAL | Age: 78
DRG: 286 | End: 2018-08-14
Payer: MEDICARE

## 2018-08-14 PROBLEM — R06.00 DOE (DYSPNEA ON EXERTION): Status: ACTIVE | Noted: 2018-08-14

## 2018-08-14 PROBLEM — I10 HYPERTENSION: Chronic | Status: ACTIVE | Noted: 2017-12-03

## 2018-08-14 PROBLEM — R06.09 DOE (DYSPNEA ON EXERTION): Status: ACTIVE | Noted: 2018-08-14

## 2018-08-14 PROBLEM — R09.02 HYPOXIA: Status: ACTIVE | Noted: 2018-08-14

## 2018-08-14 PROBLEM — I50.33 ACUTE ON CHRONIC DIASTOLIC HEART FAILURE (HCC): Status: ACTIVE | Noted: 2018-08-14

## 2018-08-14 LAB
ANION GAP SERPL CALCULATED.3IONS-SCNC: 7 MMOL/L (ref 4–13)
BASOPHILS # BLD AUTO: 0.07 THOUSANDS/ΜL (ref 0–0.1)
BASOPHILS NFR BLD AUTO: 1 % (ref 0–1)
BUN SERPL-MCNC: 20 MG/DL (ref 5–25)
CALCIUM SERPL-MCNC: 9.4 MG/DL (ref 8.3–10.1)
CHLORIDE SERPL-SCNC: 105 MMOL/L (ref 100–108)
CO2 SERPL-SCNC: 30 MMOL/L (ref 21–32)
CREAT SERPL-MCNC: 0.86 MG/DL (ref 0.6–1.3)
EOSINOPHIL # BLD AUTO: 0.72 THOUSAND/ΜL (ref 0–0.61)
EOSINOPHIL NFR BLD AUTO: 8 % (ref 0–6)
ERYTHROCYTE [DISTWIDTH] IN BLOOD BY AUTOMATED COUNT: 19.1 % (ref 11.6–15.1)
GFR SERPL CREATININE-BSD FRML MDRD: 65 ML/MIN/1.73SQ M
GLUCOSE SERPL-MCNC: 113 MG/DL (ref 65–140)
HCT VFR BLD AUTO: 32.6 % (ref 34.8–46.1)
HGB BLD-MCNC: 9.6 G/DL (ref 11.5–15.4)
IMM GRANULOCYTES # BLD AUTO: 0.05 THOUSAND/UL (ref 0–0.2)
IMM GRANULOCYTES NFR BLD AUTO: 1 % (ref 0–2)
LYMPHOCYTES # BLD AUTO: 2.4 THOUSANDS/ΜL (ref 0.6–4.47)
LYMPHOCYTES NFR BLD AUTO: 27 % (ref 14–44)
MCH RBC QN AUTO: 23.1 PG (ref 26.8–34.3)
MCHC RBC AUTO-ENTMCNC: 29.4 G/DL (ref 31.4–37.4)
MCV RBC AUTO: 79 FL (ref 82–98)
MONOCYTES # BLD AUTO: 1.09 THOUSAND/ΜL (ref 0.17–1.22)
MONOCYTES NFR BLD AUTO: 12 % (ref 4–12)
NEUTROPHILS # BLD AUTO: 4.44 THOUSANDS/ΜL (ref 1.85–7.62)
NEUTS SEG NFR BLD AUTO: 51 % (ref 43–75)
NRBC BLD AUTO-RTO: 0 /100 WBCS
PLATELET # BLD AUTO: 416 THOUSANDS/UL (ref 149–390)
PMV BLD AUTO: 8.5 FL (ref 8.9–12.7)
POTASSIUM SERPL-SCNC: 4.1 MMOL/L (ref 3.5–5.3)
RBC # BLD AUTO: 4.15 MILLION/UL (ref 3.81–5.12)
SODIUM SERPL-SCNC: 142 MMOL/L (ref 136–145)
TROPONIN I SERPL-MCNC: <0.02 NG/ML
TROPONIN I SERPL-MCNC: <0.02 NG/ML
WBC # BLD AUTO: 8.77 THOUSAND/UL (ref 4.31–10.16)

## 2018-08-14 PROCEDURE — 94760 N-INVAS EAR/PLS OXIMETRY 1: CPT

## 2018-08-14 PROCEDURE — 99222 1ST HOSP IP/OBS MODERATE 55: CPT | Performed by: INTERNAL MEDICINE

## 2018-08-14 PROCEDURE — 93306 TTE W/DOPPLER COMPLETE: CPT

## 2018-08-14 PROCEDURE — 93306 TTE W/DOPPLER COMPLETE: CPT | Performed by: INTERNAL MEDICINE

## 2018-08-14 PROCEDURE — 36415 COLL VENOUS BLD VENIPUNCTURE: CPT | Performed by: PHYSICIAN ASSISTANT

## 2018-08-14 PROCEDURE — 99285 EMERGENCY DEPT VISIT HI MDM: CPT

## 2018-08-14 PROCEDURE — 99223 1ST HOSP IP/OBS HIGH 75: CPT | Performed by: INTERNAL MEDICINE

## 2018-08-14 PROCEDURE — 93000 ELECTROCARDIOGRAM COMPLETE: CPT | Performed by: FAMILY MEDICINE

## 2018-08-14 PROCEDURE — 85025 COMPLETE CBC W/AUTO DIFF WBC: CPT | Performed by: PHYSICIAN ASSISTANT

## 2018-08-14 PROCEDURE — 84484 ASSAY OF TROPONIN QUANT: CPT | Performed by: INTERNAL MEDICINE

## 2018-08-14 PROCEDURE — 94660 CPAP INITIATION&MGMT: CPT

## 2018-08-14 PROCEDURE — 80048 BASIC METABOLIC PNL TOTAL CA: CPT | Performed by: PHYSICIAN ASSISTANT

## 2018-08-14 RX ORDER — PRAVASTATIN SODIUM 80 MG/1
80 TABLET ORAL
Status: DISCONTINUED | OUTPATIENT
Start: 2018-08-14 | End: 2018-08-17 | Stop reason: HOSPADM

## 2018-08-14 RX ORDER — FLUTICASONE FUROATE AND VILANTEROL 100; 25 UG/1; UG/1
1 POWDER RESPIRATORY (INHALATION) DAILY
Status: DISCONTINUED | OUTPATIENT
Start: 2018-08-14 | End: 2018-08-15

## 2018-08-14 RX ORDER — FLUTICASONE PROPIONATE 50 MCG
1 SPRAY, SUSPENSION (ML) NASAL DAILY
Status: DISCONTINUED | OUTPATIENT
Start: 2018-08-14 | End: 2018-08-17 | Stop reason: HOSPADM

## 2018-08-14 RX ORDER — HEPARIN SODIUM 5000 [USP'U]/ML
5000 INJECTION, SOLUTION INTRAVENOUS; SUBCUTANEOUS EVERY 8 HOURS SCHEDULED
Status: DISCONTINUED | OUTPATIENT
Start: 2018-08-14 | End: 2018-08-17 | Stop reason: HOSPADM

## 2018-08-14 RX ORDER — TEMAZEPAM 15 MG/1
15 CAPSULE ORAL
Status: DISCONTINUED | OUTPATIENT
Start: 2018-08-14 | End: 2018-08-17 | Stop reason: HOSPADM

## 2018-08-14 RX ORDER — LOSARTAN POTASSIUM 25 MG/1
25 TABLET ORAL DAILY
Status: DISCONTINUED | OUTPATIENT
Start: 2018-08-14 | End: 2018-08-17 | Stop reason: HOSPADM

## 2018-08-14 RX ORDER — OXYBUTYNIN CHLORIDE 5 MG/1
5 TABLET, EXTENDED RELEASE ORAL DAILY
Status: DISCONTINUED | OUTPATIENT
Start: 2018-08-14 | End: 2018-08-17 | Stop reason: HOSPADM

## 2018-08-14 RX ORDER — ASPIRIN 81 MG/1
81 TABLET ORAL DAILY
Status: DISCONTINUED | OUTPATIENT
Start: 2018-08-14 | End: 2018-08-17 | Stop reason: HOSPADM

## 2018-08-14 RX ORDER — ALBUTEROL SULFATE 2.5 MG/3ML
2.5 SOLUTION RESPIRATORY (INHALATION) EVERY 6 HOURS PRN
Status: DISCONTINUED | OUTPATIENT
Start: 2018-08-14 | End: 2018-08-17 | Stop reason: HOSPADM

## 2018-08-14 RX ORDER — ONDANSETRON 2 MG/ML
4 INJECTION INTRAMUSCULAR; INTRAVENOUS EVERY 6 HOURS PRN
Status: DISCONTINUED | OUTPATIENT
Start: 2018-08-14 | End: 2018-08-17 | Stop reason: HOSPADM

## 2018-08-14 RX ORDER — ACETAMINOPHEN 325 MG/1
650 TABLET ORAL EVERY 6 HOURS PRN
Status: DISCONTINUED | OUTPATIENT
Start: 2018-08-14 | End: 2018-08-17 | Stop reason: HOSPADM

## 2018-08-14 RX ORDER — FUROSEMIDE 10 MG/ML
40 INJECTION INTRAMUSCULAR; INTRAVENOUS DAILY
Status: DISCONTINUED | OUTPATIENT
Start: 2018-08-14 | End: 2018-08-17

## 2018-08-14 RX ORDER — LORATADINE 10 MG/1
10 TABLET ORAL DAILY
Status: DISCONTINUED | OUTPATIENT
Start: 2018-08-14 | End: 2018-08-17 | Stop reason: HOSPADM

## 2018-08-14 RX ORDER — PANTOPRAZOLE SODIUM 20 MG/1
20 TABLET, DELAYED RELEASE ORAL
Status: DISCONTINUED | OUTPATIENT
Start: 2018-08-14 | End: 2018-08-17 | Stop reason: HOSPADM

## 2018-08-14 RX ORDER — LEVOTHYROXINE SODIUM 0.05 MG/1
50 TABLET ORAL
Status: DISCONTINUED | OUTPATIENT
Start: 2018-08-14 | End: 2018-08-17 | Stop reason: HOSPADM

## 2018-08-14 RX ADMIN — HEPARIN SODIUM 5000 UNITS: 5000 INJECTION, SOLUTION INTRAVENOUS; SUBCUTANEOUS at 13:23

## 2018-08-14 RX ADMIN — PRAVASTATIN SODIUM 80 MG: 80 TABLET ORAL at 15:37

## 2018-08-14 RX ADMIN — FUROSEMIDE 40 MG: 10 INJECTION, SOLUTION INTRAMUSCULAR; INTRAVENOUS at 09:20

## 2018-08-14 RX ADMIN — OXYBUTYNIN CHLORIDE 5 MG: 5 TABLET, EXTENDED RELEASE ORAL at 09:12

## 2018-08-14 RX ADMIN — HEPARIN SODIUM 5000 UNITS: 5000 INJECTION, SOLUTION INTRAVENOUS; SUBCUTANEOUS at 21:40

## 2018-08-14 RX ADMIN — ACETAMINOPHEN 650 MG: 325 TABLET, FILM COATED ORAL at 21:39

## 2018-08-14 RX ADMIN — FLUTICASONE PROPIONATE 1 SPRAY: 50 SPRAY, METERED NASAL at 09:10

## 2018-08-14 RX ADMIN — LORATADINE 10 MG: 10 TABLET ORAL at 09:11

## 2018-08-14 RX ADMIN — ASPIRIN 81 MG: 81 TABLET, COATED ORAL at 09:12

## 2018-08-14 RX ADMIN — FLUTICASONE FUROATE AND VILANTEROL TRIFENATATE 1 PUFF: 100; 25 POWDER RESPIRATORY (INHALATION) at 09:52

## 2018-08-14 RX ADMIN — PANTOPRAZOLE SODIUM 20 MG: 20 TABLET, DELAYED RELEASE ORAL at 06:28

## 2018-08-14 RX ADMIN — LEVOTHYROXINE SODIUM 50 MCG: 50 TABLET ORAL at 06:28

## 2018-08-14 RX ADMIN — ACETAMINOPHEN 650 MG: 325 TABLET, FILM COATED ORAL at 03:06

## 2018-08-14 RX ADMIN — TEMAZEPAM 15 MG: 15 CAPSULE ORAL at 03:06

## 2018-08-14 RX ADMIN — LOSARTAN POTASSIUM 25 MG: 25 TABLET, FILM COATED ORAL at 09:11

## 2018-08-14 RX ADMIN — HEPARIN SODIUM 5000 UNITS: 5000 INJECTION, SOLUTION INTRAVENOUS; SUBCUTANEOUS at 06:28

## 2018-08-14 RX ADMIN — TEMAZEPAM 15 MG: 15 CAPSULE ORAL at 21:40

## 2018-08-14 RX ADMIN — SERTRALINE HYDROCHLORIDE 50 MG: 50 TABLET ORAL at 09:11

## 2018-08-14 NOTE — CASE MANAGEMENT
Initial Clinical Review    Admission: Date/Time/Statement: 8/13/18 @ 2302     Orders Placed This Encounter   Procedures    Inpatient Admission (expected length of stay for this patient is greater than two midnights)     Standing Status:   Standing     Number of Occurrences:   1     Order Specific Question:   Admitting Physician     Answer:   Ashley Conrad [[id number]]     Order Specific Question:   Level of Care     Answer:   Med Surg [16]     Order Specific Question:   Estimated length of stay     Answer:   More than 2 Midnights     Order Specific Question:   Certification     Answer:   I certify that inpatient services are medically necessary for this patient for a duration of greater than two midnights  See H&P and MD Progress Notes for additional information about the patient's course of treatment  ED: Date/Time/Mode of Arrival:   ED Arrival Information     Expected Arrival Acuity Means of Arrival Escorted By Service Admission Type    - 8/13/2018 18:08 Emergent Walk-In Self General Medicine Emergency    Arrival Complaint    ABNORMAL LABWORK        Chief Complaint:   Chief Complaint   Patient presents with    Abnormal Lab     pt had labwork and chest xray done , told by Yolie Mckenna go to ER for abnormal lab  pt also c/o SOB for few weeks       History of Illness:   Yolie Mckenna is a 65 y o  female with history of reactive airway disease, congestive heart failure, pulmonary hypertension who presents with complaints of increasing shortness of breath  She states that she becomes more short of breath with pain typical exertional activity  She states this is been getting progressively worse over last couple weeks  She states that she was seen by primary care physician today who sent her for blood work and chest x-ray  She states that she has contacted by primary care physician present emerged department for further evaluation secondary to an elevated D-dimer        P O  81% RA, O2 @ 3L via NC applied > P O  96%      ED Vital Signs:   ED Triage Vitals [18 1845]   Temperature Pulse Respirations Blood Pressure SpO2   98 °F (36 7 °C) 72 16 (!) 181/81 93 %      Temp Source Heart Rate Source Patient Position - Orthostatic VS BP Location FiO2 (%)   Oral Monitor Lying Right arm --      Pain Score       No Pain        Wt Readings from Last 1 Encounters:   18 102 kg (224 lb)     While ambulating patient, patient oxygen dropped to 81 and became tachypnic    Abnormal Labs/Diagnostic Test Results:   WBC Thousand/uL 9 20   HEMOGLOBIN g/dL 9 6*   HEMATOCRIT % 32 9*   PLATELETS Thousands/uL 426*     SODIUM mmol/L 141   POTASSIUM mmol/L 4 5   CHLORIDE mmol/L 104   CO2 mmol/L 29   BUN mg/dL 22   CREATININE mg/dL 0 75   CALCIUM mg/dL 9 8   TOTAL PROTEIN g/dL 7 6   BILIRUBIN TOTAL mg/dL 0 20   ALK PHOS U/L 86   ALT U/L 18   AST U/L 18   GLUCOSE RANDOM mg/dL 94     NT-proBNP 207 pg/mL     Troponin I <0 02 ng/mL     CTA chest:  No evidence of pulmonary embolism      EC, Sinus rhythm with 1st degree AV block    ED Treatment:   Medication Administration from 2018 1808 to 2018 1335    Date/Time Order Dose Route Action   2018 2150 iohexol (OMNIPAQUE) 350 MG/ML injection (MULTI-DOSE) 100 mL 100 mL Intravenous Given   2018 0911 losartan (COZAAR) tablet 25 mg 25 mg Oral Given   2018 0306 temazepam (RESTORIL) capsule 15 mg 15 mg Oral Given   2018 0150 temazepam (RESTORIL) capsule 15 mg 0 mg Oral Hold   2018 0911 sertraline (ZOLOFT) tablet 50 mg 50 mg Oral Given   2018 0628 pantoprazole (PROTONIX) EC tablet 20 mg 20 mg Oral Given   2018 0911 loratadine (CLARITIN) tablet 10 mg 10 mg Oral Given   2018 0628 levothyroxine tablet 50 mcg 50 mcg Oral Given   2018 0952 fluticasone-vilanterol (BREO ELLIPTA) 100-25 mcg/inh inhaler 1 puff 1 puff Inhalation Given   2018 0910 fluticasone (FLONASE) 50 mcg/act nasal spray 1 spray 1 spray Nasal Given   2018 0912 oxybutynin (DITROPAN-XL) 24 hr tablet 5 mg 5 mg Oral Given   08/14/2018 0912 aspirin (ECOTRIN LOW STRENGTH) EC tablet 81 mg 81 mg Oral Given   08/14/2018 1323 heparin (porcine) subcutaneous injection 5,000 Units 5,000 Units Subcutaneous Given   08/14/2018 0628 heparin (porcine) subcutaneous injection 5,000 Units 5,000 Units Subcutaneous Given   08/14/2018 0150 heparin (porcine) subcutaneous injection 5,000 Units 5,000 Units Subcutaneous Not Given   08/14/2018 0306 acetaminophen (TYLENOL) tablet 650 mg 650 mg Oral Given   08/14/2018 0920 furosemide (LASIX) injection 40 mg 40 mg Intravenous Given        Past Medical/Surgical History:    Active Ambulatory Problems     Diagnosis Date Noted    GERD (gastroesophageal reflux disease) 10/29/2017    Hypothyroid 10/29/2017    Hypertension 12/03/2017    Community acquired pneumonia of left lower lobe of lung ([facility] [address] ) 12/03/2017    Hyperlipidemia 12/04/2017    Reactive airway disease without complication 17/49/9287    JANICE (obstructive sleep apnea) 01/31/2018    Aortic stenosis, moderate 48/32/2314    Diastolic dysfunction 65/66/4584    LVH (left ventricular hypertrophy) 09/22/2016    Mitral annular calcification 09/22/2016    Mitral valve stenosis 09/22/2016    Morbid obesity ([facility] [address] ) 06/02/2016    Nonrheumatic aortic valve insufficiency 06/02/2016    [facility] 09/22/2016     Resolved Ambulatory Problems     Diagnosis Date Noted    Oxygen dependent 12/02/2017     Past Medical History:   Diagnosis Date    Arthritis     Cardiac disease     Coronary artery disease     Disease of thyroid gland     Dislocation of right shoulder joint     Hypertension     Murmur, cardiac     Simple goiter     Skin cyst        Admitting Diagnosis: Abnormal laboratory test result [R89 9]    Age/Sex: 65 y o  female    Assessment/Plan:   * Hypoxia   [facility]  Echo pending  Consult pulmonology  Patient requiring O2 via nasal cannula to maintain oxygen saturation          Pulmonary hypertension (HCC)   Assessment & Plan     Continue supportive care          Hypertension   Assessment & Plan     Continue losartan              VTE Prophylaxis: Heparin  / sequential compression device   Code Status:  Full  POLST: There is no POLST form on file for this patient (pre-hospital)     Anticipated Length of Stay:  Patient will be admitted on an Inpatient basis with an anticipated length of stay of  more than 2 midnights  Justification for Hospital Stay:  Respiratory support    Admission Orders:  Scheduled Meds:   Current Facility-Administered Medications:  acetaminophen 650 mg Oral Q6H PRN   albuterol 2 5 mg Nebulization Q6H PRN   aspirin 81 mg Oral Daily   fluticasone 1 spray Nasal Daily   fluticasone-vilanterol 1 puff Inhalation Daily   furosemide 40 mg Intravenous Daily   heparin (porcine) 5,000 Units Subcutaneous Q8H [address]   levothyroxine 50 mcg Oral Early Morning   loratadine 10 mg Oral Daily   losartan 25 mg Oral Daily   ondansetron 4 mg Intravenous Q6H PRN   oxybutynin 5 mg Oral Daily   pantoprazole 20 mg Oral Early Morning   pravastatin 80 mg Oral Daily With Dinner   sertraline 50 mg Oral Daily   temazepam 15 mg Oral HS       ===========================================================================    08/14/2018  Consult Pulmonary  Assessment:  1  Dyspnea on exertion  2  Pulmonary hypertension  3  JANICE on CPAP  4  Morbid obesity     Plan:   1  Dyspnea on exertion   -multifactorial in the setting of aortic stenosis, pulmonary hypertension, CHF?, deconditioning  -currently saturating 97% on 3 L via nasal cannula, documented room air saturation of 81-93%  Patient does not use oxygen at home  Would need to complete ambulatory pulse ox prior to discharge  Hypoxia possibly related to pulmonary HTN  Patient has been treated for severe JANICE with CPAP and has been compliant with this over the last few months   Could be another cause of pulm HTN contributing to hypoxia/SOB  -echo pending  Will follow up on final results  -last echocardiogram completed December 2017 with evidence of moderate aortic stenosis, estimated peak PA pressure elevated at 40 mmHg with normal RV size and function  Grade 1 diastolic dysfunction of the LV with LVEF of 60%  Mild mitral stenosis  Normal size of LA and RA   -pro BNP of 207, normal troponin  -IV lasix per cardiology, currently on 40 mg IV daily; I&Os; salt restriction   -agree with proceeding with right heart catheterization/left heart catheterization  -normal RF, BOO with elevated ESR/CRP  Pending results of [facility], would likely need to complete more testing    -Pending results of [facility], will likely discontinue Breo 100/25   2   JANICE on CPAP  -will order CPAP at a pressure of 9 cm H2O  -reviewed last compliance report with average pressure around 8 6 cm H2O  3    Morbid obesity  -strongly recommend weight loss  -discussed diet/exercise  -consider nutrition evaluation

## 2018-08-14 NOTE — ASSESSMENT & PLAN NOTE
As per last echo in March 2017 -- There was moderate stenosis  Peak gradient 52 mm Hg and mean 32 mm Hg  Follow-up TTE results

## 2018-08-14 NOTE — CONSULTS
Consultation - Pulmonary Medicine   Cat Posey 65 y o  female MRN: [id number]  Unit/Bed#: ED 12 Encounter: [id number]      Assessment:  1  Dyspnea on exertion  2  Pulmonary hypertension  3  JANICE on CPAP  4  Morbid obesity    Plan:   1  Dyspnea on exertion   -multifactorial in the setting of aortic stenosis, pulmonary hypertension, CHF?, deconditioning  -currently saturating 97% on 3 L via nasal cannula, documented room air saturation of 81-93%  Patient does not use oxygen at home  Would need to complete ambulatory pulse ox prior to discharge  Hypoxia possibly related to pulmonary HTN  Patient has been treated for severe JANICE with CPAP and has been compliant with this over the last few months  Could be another cause of pulm HTN contributing to hypoxia/SOB  -echo pending  Will follow up on final results  -last echocardiogram completed December 2017 with evidence of moderate aortic stenosis, estimated peak PA pressure elevated at 40 mmHg with normal RV size and function  Grade 1 diastolic dysfunction of the LV with LVEF of 60%  Mild mitral stenosis  Normal size of LA and RA   -pro BNP of 207, normal troponin  -IV lasix per cardiology, currently on 40 mg IV daily; I&Os; salt restriction   -agree with proceeding with right heart catheterization/left heart catheterization  -normal RF, BOO with elevated ESR/CRP  Pending results of RHC, would likely need to complete more testing    -Pending results of [address] [facility], will likely discontinue Breo 100/25   2   JANICE on CPAP  -will order CPAP at a pressure of 9 cm H2O  -reviewed last compliance report with average pressure around 8 6 cm H2O  3    Morbid obesity  -strongly recommend weight loss  -discussed diet/exercise  -consider nutrition evaluation    History of Present Illness   Physician Requesting Consult: Holly Martinez MD  Reason for Consult / Principal Problem: SOB  Hx and PE limited by: none  HPI: Cat Posey is a 65y o  year old female never smoker with significant past medical history of aortic stenosis, HTN, JANICE on CPAP, HLD, pulmonary hypertension, morbid obesity who originally presented to the ED for increased shortness of breath over the last few weeks  Patient states that she followed up with her PCP for worsening shortness of breath with exertion and had lab work/chest x-ray completed  Patient was also told to restart her Breo 100/25  D-dimer was elevated and patient was told to go into the ED for CTA of chest to rule out pulmonary embolism  Denies fever, chills, chest pain, cough, orthopnea (although does sleep slightly elevated on her hospital bed -no change recently)  In the ED, CTA of the chest was negative for blood clot or pneumonia  Cardiology was consulted  Plan for right heart/left heart catheterization  Of note, her last echo completed in December 2017 with LVEF of 57%, grade 1 diastolic dysfunction, normal RV size and function with moderate aortic stenosis and evidence of mild pulmonary hypertension with estimated peak PA pressure of 40 mmHg  Inpatient consult to Pulmonology  Consult performed by: Polly Priest ordered by: Marvin Molina          Review of Systems   Constitutional: Positive for activity change  Negative for appetite change, chills and fever  HENT: Negative for congestion and postnasal drip  Eyes: Negative for visual disturbance  Respiratory: Positive for shortness of breath  Negative for cough, chest tightness and wheezing  Cardiovascular: Negative for chest pain and leg swelling  Gastrointestinal: Negative for abdominal pain, diarrhea, nausea and vomiting  Genitourinary: Negative for difficulty urinating  Musculoskeletal: Negative for arthralgias and gait problem  Skin: Negative for rash and wound  Allergic/Immunologic: Negative for immunocompromised state  Neurological: Negative for dizziness, weakness and light-headedness  Hematological: Negative for adenopathy  Psychiatric/Behavioral: Negative for agitation, behavioral problems and confusion  Historical Information   Past Medical History:   Diagnosis Date    Arthritis     Cardiac disease     "leaky valve"    Coronary artery disease     Disease of thyroid gland     Dislocation of right shoulder joint     Hypertension     Murmur, cardiac     Simple goiter     Skin cyst     within the armpits, right     Past Surgical History:   Procedure Laterality Date    BREAST BIOPSY      CARPAL TUNNEL RELEASE      CHOLECYSTECTOMY      DILATION AND CURETTAGE OF UTERUS      HYSTEROSCOPY      JOINT REPLACEMENT      b/l knee     JOINT REPLACEMENT  2007    left hip     Social History   History   Alcohol Use No     History   Drug Use No     History   Smoking Status    Never Smoker   Smokeless Tobacco    Never Used     Occupational History:     Family History: non-contributory    Meds/Allergies   all current active meds have been reviewed    Allergies   Allergen Reactions    Aspirin Other (See Comments)     erythema per [facility] order    Latex Rash    Neosporin [Neomycin-Bacitracin Zn-Polymyx] Rash and Other (See Comments)     hives per [facility] order       Objective   Vitals: Blood pressure 158/68, pulse 62, temperature 97 9 °F (36 6 °C), temperature source Oral, resp  rate 20, weight 102 kg (224 lb), SpO2 97 %  ,Body mass index is 52 06 kg/m²  Intake/Output Summary (Last 24 hours) at 08/14/18 1158  Last data filed at 08/14/18 1036   Gross per 24 hour   Intake                0 ml   Output              300 ml   Net             -300 ml     Invasive Devices     Peripheral Intravenous Line            Peripheral IV 08/13/18 Left Antecubital less than 1 day                Physical Exam   Constitutional: She is oriented to person, place, and time  She appears well-developed  No distress  obese   HENT:   Head: Normocephalic and atraumatic  Neck: Normal range of motion  Cardiovascular: Normal rate and regular rhythm  Murmur heard  Pulmonary/Chest: Effort normal and breath sounds normal  No respiratory distress  She has no wheezes  She has no rales  She exhibits no tenderness  Abdominal: Soft  There is no tenderness  Musculoskeletal: Normal range of motion  She exhibits no edema or tenderness  Lymphadenopathy:     She has no cervical adenopathy  Neurological: She is alert and oriented to person, place, and time  Skin: Skin is warm and dry  She is not diaphoretic  Psychiatric: She has a normal mood and affect  Her behavior is normal    Nursing note and vitals reviewed  Lab Results:   I have personally reviewed pertinent lab results  , CBC:   Lab Results   Component Value Date    WBC 8 77 08/14/2018    HGB 9 6 (L) 08/14/2018    HCT 32 6 (L) 08/14/2018    MCV 79 (L) 08/14/2018     (H) 08/14/2018    MCH 23 1 (L) 08/14/2018    MCHC 29 4 (L) 08/14/2018    RDW 19 1 (H) 08/14/2018    MPV 8 5 (L) 08/14/2018    NRBC 0 08/14/2018   , CMP:   Lab Results   Component Value Date     08/14/2018    K 4 1 08/14/2018     08/14/2018    CO2 30 08/14/2018    ANIONGAP 7 08/14/2018    BUN 20 08/14/2018    CREATININE 0 86 08/14/2018    GLUCOSE 113 08/14/2018    CALCIUM 9 4 08/14/2018    AST 18 08/13/2018    ALT 18 08/13/2018    ALKPHOS 86 08/13/2018    PROT 7 6 08/13/2018    BILITOT 0 20 08/13/2018    EGFR 65 08/14/2018     Imaging Studies: I have personally reviewed pertinent reports  EKG, Pathology, and Other Studies: I have personally reviewed pertinent reports  VTE Prophylaxis: Heparin    Code Status: Level 1 - Full Code  Advance Directive and Living Will:      Power of :    POLST:      Counseling/Coordination of Care: Total floor / unit time spent today 35 minutes  Greater than 50% of total time was spent with the patient and / or family counseling and / or coordination of care   A description of the counseling / coordination of care: Discussion with attending physician, discussion with slim, discussion with Cardiology, discussion of diagnostic studies and clinical impressions in detail with patient

## 2018-08-14 NOTE — CONSULTS
Consultation - Cardiology Team One  Rosaline Nieto 65 y o  female MRN: [id number]  Unit/Bed#: ED 12 Encounter: [id number]    Inpatient consult to Cardiology  Consult performed by: Cheo Jordan  Consult ordered by: Wilner Seay          Physician Requesting Consult: No att  providers found  Reason for Consult / Principal Problem: SOB, GALINDO    HPI: Cardiologist Dr Deion Barrow is a 65y o  year old female who has a history of moderate aortic stenosis, JANICE, HTN, HLD, pulmonary hypertension, diastolic dysfunction, obesity presenting with shortness of breath with exertion over the past few weeks, acutely getting worse over the past 2-3 days  Patient has shortness of breath with minimal exertion  Denies orthopnea  Patient states that she has gained weight, however upon review of records shows that she was essentially the same weight during office visit 1 month ago  Was recommended to come to the emergency department given elevated D-dimer  However CTA was negative for pulmonary embolism  Currently, denies chest pain, lightheadedness, palpitations, syncope  Has had echocardiogram in December 2017 which showed normal EF, however showed moderate aortic stenosis at that time  Stress test in November 2017 was unremarkable for ischemia  REVIEW OF SYSTEMS:  Constitutional:  +weight gain   Denies fever or chills   Eyes:  Denies change in visual acuity   HENT:  Denies nasal congestion or sore throat   Respiratory:  +SOB, +GALINDO  Cardiovascular:  Denies chest pain or edema   GI:  Denies abdominal pain, nausea, vomiting, bloody stools or diarrhea   :  Denies dysuria, frequency, difficulty in micturition and nocturia  Musculoskeletal:  Denies back pain or joint pain   Neurologic:  Denies headache, focal weakness or sensory changes   Endocrine:  Denies polyuria or polydipsia   Lymphatic:  Denies swollen glands   Psychiatric:  Denies depression or anxiety     Historical Information   Past Medical History: Diagnosis Date    Arthritis     Cardiac disease     "leaky valve"    Coronary artery disease     Disease of thyroid gland     Dislocation of right shoulder joint     Hypertension     Murmur, cardiac     Simple goiter     Skin cyst     within the armpits, right     Past Surgical History:   Procedure Laterality Date    BREAST BIOPSY      CARPAL TUNNEL RELEASE      CHOLECYSTECTOMY      DILATION AND CURETTAGE OF UTERUS      HYSTEROSCOPY      JOINT REPLACEMENT      b/l knee     JOINT REPLACEMENT  2007    left hip     History   Alcohol Use No     History   Drug Use No     History   Smoking Status    Never Smoker   Smokeless Tobacco    Never Used       Family History:   Family History   Problem Relation Age of Onset    Diabetes Mother     Stroke Mother     Cancer Father     Lung cancer Father     Diabetes Sister     Heart disease Sister     Coronary artery disease Family     Diabetes Family     Hypertension Family     Cancer Family     Stroke Family        MEDS & ALLERGIES:  all current active meds have been reviewed and current meds: Current Facility-Administered Medications   Medication Dose Route Frequency    acetaminophen (TYLENOL) tablet 650 mg  650 mg Oral Q6H PRN    albuterol inhalation solution 2 5 mg  2 5 mg Nebulization Q6H PRN    aspirin (ECOTRIN LOW STRENGTH) EC tablet 81 mg  81 mg Oral Daily    fluticasone (FLONASE) 50 mcg/act nasal spray 1 spray  1 spray Nasal Daily    fluticasone-vilanterol (BREO ELLIPTA) 100-25 mcg/inh inhaler 1 puff  1 puff Inhalation Daily    furosemide (LASIX) injection 40 mg  40 mg Intravenous Daily    heparin (porcine) subcutaneous injection 5,000 Units  5,000 Units Subcutaneous Q8H [address]    levothyroxine tablet 50 mcg  50 mcg Oral Early Morning    loratadine (CLARITIN) tablet 10 mg  10 mg Oral Daily    losartan (COZAAR) tablet 25 mg  25 mg Oral Daily    ondansetron (ZOFRAN) injection 4 mg  4 mg Intravenous Q6H PRN    oxybutynin (DITROPAN-XL) 24 hr tablet 5 mg  5 mg Oral Daily    pantoprazole (PROTONIX) EC tablet 20 mg  20 mg Oral Early Morning    pravastatin (PRAVACHOL) tablet 80 mg  80 mg Oral Daily With Dinner    sertraline (ZOLOFT) tablet 50 mg  50 mg Oral Daily    temazepam (RESTORIL) capsule 15 mg  15 mg Oral HS        Allergies   Allergen Reactions    Aspirin Other (See Comments)     erythema per [facility] order    Latex Rash    Neosporin [Neomycin-Bacitracin Zn-Polymyx] Rash and Other (See Comments)     hives per [facility] order       OBJECTIVE:  Vitals:   Vitals:    08/14/18 0915   BP: 158/68   Pulse: 62   Resp: 20   Temp:    SpO2: 97%     Body mass index is 52 06 kg/m²  Systolic (11HGP), NHR:504 , Min:137 , WNU:114     Diastolic (23DNT), OLLIE:78, Min:60, Max:81    No intake or output data in the 24 hours ending 08/14/18 0953  Weight (last 2 days)     Date/Time   Weight    08/13/18 1845  102 (224)            Invasive Devices     Peripheral Intravenous Line            Peripheral IV 08/13/18 Left Antecubital less than 1 day                PHYSICAL EXAMS:  General:  +morbid obesity  Patient is not in acute distress, laying in the bed comfortably, awake, alert responding to commands  Head: Normocephalic, Atraumatic  HEENT: White sclera, pink conjunctiva,  PERRLA,pharynx benign  Neck:  Supple, no neck vein distention, carotids+2/+2 no bruits, thyromegaly, adenopathy  Respiratory: clear to P/A  Cardiovascular:  PMI normal, S1-S2 normal, No  Murmurs, thrills, gallops, rubs   Regular rhythm  GI:  Abdomen soft nontender   No hepatosplenomegaly, adenopathy, ascites,or rebound tenderness  Extremities: +trace edema, normal pulses, no calf tenderness, no joint deformities, no venous disease   Integument:  No skin rashes or ulceration  Lymphatic:  No cervical or inguinal lymphadenopathy  Neurologic:  Patient is awake alert, responding to command, well-oriented to time and place and person moving all extremities    LABORATORY RESULTS:    Results from last 7 days  Lab Units 18  1946   TROPONIN I ng/mL <0 02     CBC with diff:   Results from last 7 days  Lab Units 18  0600 18  1319   WBC Thousand/uL 8 77 9 20   HEMOGLOBIN g/dL 9 6* 9 6*   HEMATOCRIT % 32 6* 32 9*   MCV fL 79* 79*   PLATELETS Thousands/uL 416* 426*   MCH pg 23 1* 22 9*   MCHC g/dL 29 4* 29 2*   RDW % 19 1* 18 8*   MPV fL 8 5* 8 8*   NRBC AUTO /100 WBCs 0 0       CMP:  Results from last 7 days  Lab Units 18  0600 18  1319   SODIUM mmol/L 142 141   POTASSIUM mmol/L 4 1 4 5   CHLORIDE mmol/L 105 104   CO2 mmol/L 30 29   ANION GAP mmol/L 7 8   BUN mg/dL 20 22   CREATININE mg/dL 0 86 0 75   GLUCOSE RANDOM mg/dL 113 94   CALCIUM mg/dL 9 4 9 8   AST U/L  --  18   ALT U/L  --  18   ALK PHOS U/L  --  86   TOTAL PROTEIN g/dL  --  7 6   BILIRUBIN TOTAL mg/dL  --  0 20   EGFR ml/min/1 73sq m 65 77       BMP:  Results from last 7 days  Lab Units 18  0600 18  1319   SODIUM mmol/L 142 141   POTASSIUM mmol/L 4 1 4 5   CHLORIDE mmol/L 105 104   CO2 mmol/L 30 29   BUN mg/dL 20 22   CREATININE mg/dL 0 86 0 75   GLUCOSE RANDOM mg/dL 113 94   CALCIUM mg/dL 9 4 9 8         Results from last 7 days  Lab Units 18  1946 18  1319   NT-PRO BNP pg/mL 207 105                        Lipid Profile:   Lab Results   Component Value Date    CHOL 133 2018     Lab Results   Component Value Date    HDL 65 (H) 2018     Lab Results   Component Value Date    LDLCALC 51 2018     Lab Results   Component Value Date    TRIG 83 2018       Cardiac testing:   Results for orders placed during the hospital encounter of 17   Echo complete with contrast if indicated    Narrative [address] [place] [address] [place], [place] [address] [phone]    Transthoracic Echocardiogram  2D, M-mode, Doppler, and Color Doppler    Study date:  06-Dec-2017    Patient: Jamie Barrios  MR number: [id number]  Account number: [de-identified]  : 1940  Age: 76 years  Gender: Female  Status: Inpatient  Location: Bedside  Height: 57 in  Weight: 225 lb  BP: 162/ 72 mmHg    Indications: Aortic Valve Disease, Pulmonary Hypertension, Dyspnea, Shortness of Breath, Wheezing, Tachypnea    Diagnoses: I27 9 - Pulmonary heart disease, unspecified, I35 9 - Nonrheumatic aortic valve disorder, unspecified, R06 00 - Dyspnea, unspecified, R06 02 - Shortness of breath    Sonographer:  RACHAEL Lucero,RDCS  Interpreting Physician:  Cathryn Macias MD  Referring Physician:  Obey Banuelos DO  Group:  [facility]    SUMMARY    LEFT VENTRICLE:  Systolic function was normal  Ejection fraction was estimated to be 60 %  There were no regional wall motion abnormalities  Wall thickness was mildly increased  There was mild concentric hypertrophy  Doppler parameters were consistent with abnormal left ventricular relaxation (grade 1 diastolic dysfunction)  The ratio of mitral peak early diastolic velocity to medial annulus peak early diastolic velocity (E/Ea) was 17  RIGHT VENTRICLE:  The size was normal   Systolic function was normal     MITRAL VALVE:  There was mild to moderate annular calcification  There was mild stenosis  Mean gradient 4 6 mm Hg  There was trace regurgitation  AORTIC VALVE:  There was moderate stenosis  Peak and mean gradients were 55 and 34 mm Hg compared to 52 and 32 mm Hg respectively in March 2017  There was trace regurgitation  TRICUSPID VALVE:  There was trace regurgitation  Estimated peak PA pressure was 40 mmHg  The findings suggest mild pulmonary hypertension  HISTORY: PRIOR HISTORY: Hypothyroid, Hyperlipidemia, Hypertension, Community Aquired Pneumonia    PROCEDURE: The procedure was performed at the bedside  This was a routine study  The transthoracic approach was used  The study included complete 2D imaging, M-mode, complete spectral Doppler, and color Doppler  The heart rate was 65 bpm,  at the start of the study  Images were obtained from the parasternal, apical, subcostal, and suprasternal notch acoustic windows  Image quality was adequate  LEFT VENTRICLE: Size was normal  Systolic function was normal  Ejection fraction was estimated to be 60 %  There were no regional wall motion abnormalities  Wall thickness was mildly increased  There was mild concentric hypertrophy  No  evidence of apical thrombus  DOPPLER: Doppler parameters were consistent with abnormal left ventricular relaxation (grade 1 diastolic dysfunction)  RIGHT VENTRICLE: The size was normal  Systolic function was normal  Wall thickness was normal     LEFT ATRIUM: Size was normal     RIGHT ATRIUM: Size was normal     MITRAL VALVE: There was mild to moderate annular calcification  There was mildly restricted mobility  DOPPLER: The transmitral velocity was within the normal range  There was mild stenosis  Mean gradient 4 6 mm Hg  There was trace  regurgitation  AORTIC VALVE: The valve was trileaflet  Leaflets exhibited mildly increased thickness, mild calcification, and normal cuspal separation  DOPPLER: There was moderate stenosis  Peak and mean gradients were 55 and 34 mm Hg compared to 52 and  32 mm Hg respectively in March 2017  There was trace regurgitation  TRICUSPID VALVE: The valve structure was normal  There was normal leaflet separation  DOPPLER: The transtricuspid velocity was within the normal range  There was no evidence for stenosis  There was trace regurgitation  Estimated peak PA  pressure was 40 mmHg  The findings suggest mild pulmonary hypertension  PULMONIC VALVE: Not well visualized  DOPPLER: The transpulmonic velocity was within the normal range  There was no significant regurgitation  PERICARDIUM: There was no pericardial effusion  The pericardium was normal in appearance  AORTA: The root exhibited normal size  SYSTEMIC VEINS: IVC: The inferior vena cava was normal in size   Respirophasic changes were normal     MEASUREMENT TABLES    DOPPLER MEASUREMENTS  Left ventricle   (Reference normals)  E/Ea, med main, tiss DP   17    (--)    SYSTEM MEASUREMENT TABLES    2D  %FS: 36 6 %  Ao Diam: 2 8 cm  EDV(Teich): 90 8 ml  EF(Teich): 66 5 %  ESV(Teich): 30 4 ml  IVSd: 1 1 cm  LA Area: 21 4 cm2  LA Diam: 4 3 cm  LVEDV MOD A4C: 85 4 ml  LVEF MOD A4C: 70 2 %  LVESV MOD A4C: 25 5 ml  LVIDd: 4 5 cm  LVIDs: 2 8 cm  LVLd A4C: 8 1 cm  LVLs A4C: 5 5 cm  LVOT Diam: 2 cm  LVPWd: 1 1 cm  RA Area: 17 3 cm2  RVIDd: 3 6 cm  SV MOD A4C: 59 9 ml  SV(Teich): 60 4 ml    CW  AR Dec [place]: 2 1 m/s2  AR Dec Time: 1516 1 ms  AR PHT: 439 7 ms  AR Vmax: 3 2 m/s  AR maxP 2 mmHg  AV Env  Ti: 331 1 ms  AV VTI: 82 cm  AV Vmax: 3 6 m/s  AV Vmean: 2 5 m/s  AV maxP 1 mmHg  AV meanP mmHg  HR: 64 1 BPM  MV VTI: 56 5 cm  MV Vmax: 1 7 m/s  MV Vmean: 1 m/s  MV maxP 8 mmHg  MV meanP 6 mmHg  TR Vmax: 3 m/s  TR maxP 8 mmHg    MM  TAPSE: 2 4 cm    PW  VASU (VTI): 1 4 cm2  VASU Vmax: 1 2 cm2  E': 0 1 m/s  E/E': 16 5  LVOT Env  Ti: 411 ms  LVOT VTI: 35 8 cm  LVOT Vmax: 1 4 m/s  LVOT Vmean: 0 9 m/s  LVOT maxP 8 mmHg  LVOT meanPG: 3 6 mmHg  LVSV Dopp: 112 2 ml  MV A Jose: 1 6 m/s  MV Dec [place]: 4 m/s2  MV DecT: 292 5 ms  MV E Jose: 1 2 m/s  MV E/A Ratio: 0 7  MV PHT: 84 8 ms  MVA (VTI): 2 cm2  MVA By PHT: 2 6 cm2    [facility]redited [facility]    Prepared and electronically signed by    Ctahryn Macias MD  Signed 06-Dec-2017 17:58:44         Imaging:   I have personally reviewed pertinent reports  EKG reviewed personally:  NSR      Assessment/Plan:  1  SOB:  -differential diagnosis includes multifactorial vs aortic stenosis vs CHF vs noncardiac etiology vs deconditioning  -JVD difficult to assess given thick neck    -, although likely inaccurate given morbid obesity  -serial troponins negative x2, continue to trend  -CXR negative for acute consolidation or congestion, CTA chest negative for pulmonary embolism  -Stress test November 2017 unremarkable for ischemia  -ECHO December 2017 shows EF 60%, no regional wall motion abnormalities, moderate aortic stenosis, estimated peak PA pressure 40 mmHg  -ECHO ordered this admission, will assess EF and regional wall motion abnormalities  -Cr 0 86  Will try trial of lasix  -will plan for left and right heart cardiac catheterization  Procedure of cardiac catheterization explained, risks and benefits discussed  Adverse outcomes include 1% risk of bleeding, infection, stroke, myocardial infarction, kidney injury  All questions answered, informed consent obtained  NPO after midnight  2   Aortic stenosis:  Moderate per December 2017 echocardiogram   Will reassess status via echocardiogram this admission  3   JANICE:  On CPAP    4  Obesity:  Weight loss counseling  5   Hyperlipidemia:  Continue statin  6   Hypertension:  Stable, continue present regimen  Code Status: Level 1 - Full Code    Counseling / Coordination of Care  Total floor / unit time spent today 35 minutes  Greater than 50% of total time was spent with the patient and / or family counseling and / or coordination of care  A description of the counseling / coordination of care: Review of history, current assessment, development of a plan      Aleksandr Montes De Oca PA-C  7/72/4408,2:75 AM

## 2018-08-14 NOTE — ED NOTES
Patient transferred to hospital bed at this time  Call bell within reach  Will continue to monitor        Ed Mata RN  08/14/18 1015

## 2018-08-14 NOTE — ED PROVIDER NOTES
History  Chief Complaint   Patient presents with    Abnormal Lab     pt had labwork and chest xray done , told by Constantino Hayden go to ER for abnormal lab  pt also c/o SOB for few weeks     69-year-old female presents the emergency department with complaints of shortness of breath  States she has had shortness of breath when lying flat and with exertion over the past 2-3 weeks  Denies associated chest pain  No fevers or cough  States that she made appoint with her family doctor seen earlier today in the office  Patient states that she had some outpatient blood work done as well as a chest x-ray and was told to come to the emergency department for abnormal labs  History provided by:  Patient   used: No        Prior to Admission Medications   Prescriptions Last Dose Informant Patient Reported? Taking?    Fesoterodine Fumarate ER (TOVIAZ) 8 MG TB24   Yes No   Sig: Take 8 mg by mouth daily   Multiple Vitamin (MULTIVITAMIN) tablet   Yes No   Sig: Take 1 tablet by mouth 2 (two) times a day   albuterol (2 5 mg/3 mL) 0 083 % nebulizer solution   No No   Sig: Take 3 mL by nebulization every 6 (six) hours as needed for wheezing or shortness of breath   aspirin (ECOTRIN LOW STRENGTH) 81 mg EC tablet   No No   Sig: Take 1 tablet by mouth daily   b complex vitamins capsule   Yes No   Sig: Take 1 capsule by mouth 2 (two) times a day     calcium carbonate (OS-SYLVETSER) 600 MG tablet   Yes No   Sig: Take 1 tablet by mouth 2 (two) times a day     fluticasone (FLONASE) 50 mcg/act nasal spray   Yes No   Si spray into each nostril daily   fluticasone-vilanterol (BREO ELLIPTA) 100-25 mcg/inh inhaler   No No   Sig: Inhale 1 puff daily   levocetirizine (XYZAL) 5 MG tablet   Yes No   Sig: TAKE 1 TABLET BY MOUTH EVERY DAY AT NIGHT   levothyroxine 50 mcg tablet   Yes No   Sig: Take 50 mcg by mouth daily   loratadine (CLARITIN) 10 mg tablet   Yes No   Sig: Take 10 mg by mouth daily   losartan (COZAAR) 50 mg tablet   No No   Sig: Take 0 5 tablets (25 mg total) by mouth daily   omeprazole (PriLOSEC) 40 MG capsule   Yes No   Sig: Take 40 mg by mouth 2 (two) times a day   sertraline (ZOLOFT) 50 mg tablet   Yes No   Sig: Take 50 mg by mouth daily   simvastatin (ZOCOR) 40 mg tablet   Yes No   Sig: Take 40 mg by mouth daily at bedtime   temazepam (RESTORIL) 15 mg capsule   Yes No   Sig: Take 15 mg by mouth daily      Facility-Administered Medications: None       Past Medical History:   Diagnosis Date    Arthritis     Cardiac disease     "leaky valve"    Coronary artery disease     Disease of thyroid gland     Dislocation of right shoulder joint     Hypertension     Murmur, cardiac     Simple goiter     Skin cyst     within the armpits, right       Past Surgical History:   Procedure Laterality Date    BREAST BIOPSY      CARPAL TUNNEL RELEASE      CHOLECYSTECTOMY      DILATION AND CURETTAGE OF UTERUS      HYSTEROSCOPY      JOINT REPLACEMENT      b/l knee     JOINT REPLACEMENT  2007    left hip       Family History   Problem Relation Age of Onset    Diabetes Mother     Stroke Mother     Cancer Father     Lung cancer Father     Diabetes Sister     Heart disease Sister     Coronary artery disease Family     Diabetes Family     Hypertension Family     Cancer Family     Stroke Family      I have reviewed and agree with the history as documented  Social History   Substance Use Topics    Smoking status: Never Smoker    Smokeless tobacco: Never Used    Alcohol use No        Review of Systems   Constitutional: Negative for activity change, appetite change, chills and fever  HENT: Negative for congestion, dental problem, drooling, ear discharge, ear pain, mouth sores, nosebleeds, rhinorrhea, sore throat and trouble swallowing  Eyes: Negative for pain, discharge and itching  Respiratory: Positive for shortness of breath  Negative for cough, chest tightness and wheezing      Cardiovascular: Negative for chest pain and palpitations  Gastrointestinal: Negative for abdominal pain, blood in stool, constipation, diarrhea, nausea and vomiting  Endocrine: Negative for cold intolerance and heat intolerance  Genitourinary: Negative for difficulty urinating, dysuria, flank pain, frequency and urgency  Skin: Negative for rash and wound  Allergic/Immunologic: Negative for food allergies and immunocompromised state  Neurological: Negative for dizziness, seizures, syncope, weakness, numbness and headaches  Psychiatric/Behavioral: Negative for agitation, behavioral problems and confusion  Physical Exam  Physical Exam   Constitutional: She is oriented to person, place, and time  She appears well-developed and well-nourished  No distress  HENT:   Head: Normocephalic and atraumatic  Right Ear: External ear normal    Left Ear: External ear normal    Mouth/Throat: Oropharynx is clear and moist  No oropharyngeal exudate  Eyes: Conjunctivae are normal    Neck: No JVD present  No tracheal deviation present  Cardiovascular: Normal rate, regular rhythm and normal heart sounds  Exam reveals no gallop and no friction rub  No murmur heard  Pulmonary/Chest: Effort normal and breath sounds normal  No respiratory distress  She has no wheezes  She has no rales  She exhibits no tenderness  Abdominal: Soft  Bowel sounds are normal  She exhibits no distension  There is no tenderness  There is no guarding  Musculoskeletal: Normal range of motion  She exhibits no edema, tenderness or deformity  Lymphadenopathy:     She has no cervical adenopathy  Neurological: She is alert and oriented to person, place, and time  Skin: Skin is warm and dry  No rash noted  She is not diaphoretic  No erythema  Psychiatric: She has a normal mood and affect  Her behavior is normal    Nursing note and vitals reviewed        Vital Signs  ED Triage Vitals [08/13/18 1845]   Temperature Pulse Respirations Blood Pressure SpO2   98 °F (36 7 °C) 72 16 (!) 181/81 93 %      Temp Source Heart Rate Source Patient Position - Orthostatic VS BP Location FiO2 (%)   Oral Monitor Lying Right arm --      Pain Score       No Pain           Vitals:    08/13/18 1845 08/13/18 2256   BP: (!) 181/81 141/76   Pulse: 72 63   Patient Position - Orthostatic VS: Lying Lying       Visual Acuity      ED Medications  Medications   iohexol (OMNIPAQUE) 350 MG/ML injection (MULTI-DOSE) 100 mL (100 mL Intravenous Given 8/13/18 2150)       Diagnostic Studies  Results Reviewed     Procedure Component Value Units Date/Time    B-type natriuretic peptide [[id number]]  (Normal) Collected:  08/13/18 1946    Lab Status:  Final result Specimen:  Blood from Arm, Left Updated:  08/13/18 2023     NT-proBNP 207 pg/mL     Troponin I [[id number]]  (Normal) Collected:  08/13/18 1946    Lab Status:  Final result Specimen:  Blood from Arm, Left Updated:  08/13/18 2020     Troponin I <0 02 ng/mL                  CTA ED chest PE study   Final Result by Sharon Singleton MD (08/13 2211)      No evidence of pulmonary embolism        Workstation performed: [id number]                    Procedures  ECG 12 Lead Documentation  Date/Time: 8/13/2018 9:58 PM  Performed by: Phylicia Home by: Javier Loja     Indications / Diagnosis:  SOB  ECG reviewed by me, the ED Provider: yes    Patient location:  ED  Previous ECG:     Previous ECG:  Compared to current    Comparison ECG info:  12/2/17    Similarity:  No change  Interpretation:     Interpretation: normal    Rate:     ECG rate:  70    ECG rate assessment: normal    Rhythm:     Rhythm: sinus rhythm    Ectopy:     Ectopy: none    QRS:     QRS axis:  Normal    QRS intervals:  Normal  Conduction:     Conduction: abnormal      Abnormal conduction: 1st degree    ST segments:     ST segments:  Normal  T waves:     T waves: normal             Phone Contacts  ED Phone Contact    ED Course                               MDM  Number of Diagnoses or Management Options  Dyspnea on exertion:   Hypoxia:   Diagnosis management comments: Differential diagnosis includes but not limited to:  Congestive heart failure, pulmonary embolism coronary artery disease       Amount and/or Complexity of Data Reviewed  Clinical lab tests: ordered and reviewed  Tests in the radiology section of CPT®: ordered and reviewed  Review and summarize past medical records: yes  Discuss the patient with other providers: yes  Independent visualization of images, tracings, or specimens: yes      CritCare Time    Disposition  Final diagnoses:   Dyspnea on exertion   Hypoxia     Time reflects when diagnosis was documented in both MDM as applicable and the Disposition within this note     Time User Action Codes Description Comment    8/13/2018 11:01 PM Haley Stallings Add [R06 09] Dyspnea on exertion     8/13/2018 11:01 PM Eduarda BANDA Add [R09 02] Hypoxia       ED Disposition     ED Disposition Condition Comment    Admit  Case was discussed with [facility] and the patient's admission status was agreed to be Admission Status: inpatient status to the service of Dr Nathanael Cheatham          Follow-up Information    None         Patient's Medications   Discharge Prescriptions    No medications on file     No discharge procedures on file      ED Provider  Electronically Signed by           Samuel Leung PA-C  08/13/18 6675

## 2018-08-14 NOTE — ASSESSMENT & PLAN NOTE
Blood pressure is uncontrolled on arrival in the ED  Probably contributing to CHF exacerbation  Patient continue with losartan, Lasix

## 2018-08-14 NOTE — H&P
H&P- Go Leonardo 1940, 65 y o  female MRN: [id number]    Unit/Bed#: ED 12 Encounter: [id number]    Primary Care Provider: MABEL Wang   Date and time admitted to hospital: 8/13/2018  7:11 PM        * [address] Cardiology  Echo pending  Consult pulmonology  Patient requiring O2 via nasal cannula to maintain oxygen saturation        Pulmonary hypertension ([address] )   Assessment & Plan    Continue supportive care        Hypertension   Assessment & Plan    Continue losartan              VTE Prophylaxis: Heparin  / sequential compression device   Code Status:  Full  POLST: There is no POLST form on file for this patient (pre-hospital)  Discussion with family:  There is no family present for discussion    Anticipated Length of Stay:  Patient will be admitted on an Inpatient basis with an anticipated length of stay of  more than 2 midnights  Justification for Hospital Stay:  Respiratory support    Total Time for Visit, including Counseling / Coordination of Care: 30 minutes  Greater than 50% of this total time spent on direct patient counseling and coordination of care  Chief Complaint:   Dyspnea on exertion    History of Present Illness:    Go Leonardo is a 65 y o  female with history of reactive airway disease, congestive heart failure, pulmonary hypertension who presents with complaints of increasing shortness of breath  She states that she becomes more short of breath with pain typical exertional activity  She states this is been getting progressively worse over last couple weeks  She states that she was seen by primary care physician today who sent her for blood work and chest x-ray  She states that she has contacted by primary care physician present emerged department for further evaluation secondary to an elevated D-dimer  CT of the chest was performed in the ED and found to be negative for PE    Echo was completed in 2017 which showed an EF of 60%   Patient states that she does not become more short of breath when lying flat  She denies any extremity swelling  Review of Systems:    Review of Systems   Respiratory: Positive for shortness of breath  All other systems reviewed and are negative  Past Medical and Surgical History:     Past Medical History:   Diagnosis Date    Arthritis     Cardiac disease     "leaky valve"    Coronary artery disease     Disease of thyroid gland     Dislocation of right shoulder joint     Hypertension     Murmur, cardiac     Simple goiter     Skin cyst     within the armpits, right       Past Surgical History:   Procedure Laterality Date    BREAST BIOPSY      CARPAL TUNNEL RELEASE      CHOLECYSTECTOMY      DILATION AND CURETTAGE OF UTERUS      HYSTEROSCOPY      JOINT REPLACEMENT      b/l knee     JOINT REPLACEMENT  2007    left hip       Meds/Allergies:    Prior to Admission medications    Medication Sig Start Date End Date Taking?  Authorizing Provider   albuterol (2 5 mg/3 mL) 0 083 % nebulizer solution Take 3 mL by nebulization every 6 (six) hours as needed for wheezing or shortness of breath 10/30/17   Selin Champion MD   aspirin (ECOTRIN LOW STRENGTH) 81 mg EC tablet Take 1 tablet by mouth daily 10/31/17   Selin Champion MD   b complex vitamins capsule Take 1 capsule by mouth 2 (two) times a day      Historical Provider, MD   calcium carbonate (OS-SYLVESTER) 600 MG tablet Take 1 tablet by mouth 2 (two) times a day      Historical Provider, MD   Fesoterodine Fumarate ER (TOVIAZ) 8 MG TB24 Take 8 mg by mouth daily    Historical Provider, MD   fluticasone (FLONASE) 50 mcg/act nasal spray 1 spray into each nostril daily    Historical Provider, MD   fluticasone-vilanterol (BREO ELLIPTA) 100-25 mcg/inh inhaler Inhale 1 puff daily 8/13/18   MABEL Castro   levocetirizine (XYZAL) 5 MG tablet TAKE 1 TABLET BY MOUTH EVERY DAY AT NIGHT 7/30/18   Historical Provider, MD   levothyroxine 50 mcg tablet Take 50 mcg by mouth daily    Historical Provider, MD   loratadine (CLARITIN) 10 mg tablet Take 10 mg by mouth daily    Historical Provider, MD   losartan (COZAAR) 50 mg tablet Take 0 5 tablets (25 mg total) by mouth daily 8/13/18   MABEL Castro   Multiple Vitamin (MULTIVITAMIN) tablet Take 1 tablet by mouth 2 (two) times a day    Historical Provider, MD   omeprazole (PriLOSEC) 40 MG capsule Take 40 mg by mouth 2 (two) times a day 6/23/18   Historical Provider, MD   sertraline (ZOLOFT) 50 mg tablet Take 50 mg by mouth daily    Historical Provider, MD   simvastatin (ZOCOR) 40 mg tablet Take 40 mg by mouth daily at bedtime    Historical Provider, MD   temazepam (RESTORIL) 15 mg capsule Take 15 mg by mouth daily 7/2/18   Historical Provider, MD     I have reviewed home medications with patient personally  Allergies:    Allergies   Allergen Reactions    Aspirin Other (See Comments)     erythema per [facility] order    Latex Rash    Neosporin [Neomycin-Bacitracin Zn-Polymyx] Rash and Other (See Comments)     hives per [facility] order       Social History:     Marital Status: Single   Occupation:  Retired  Patient Pre-hospital Living Situation:  Lives at home  Patient Pre-hospital Level of Mobility:  Ambulatory with walker  Patient Pre-hospital Diet Restrictions:  None  Substance Use History:   History   Alcohol Use No     History   Smoking Status    Never Smoker   Smokeless Tobacco    Never Used     History   Drug Use No       Family History:    Family History   Problem Relation Age of Onset    Diabetes Mother     Stroke Mother     Cancer Father     Lung cancer Father     Diabetes Sister     Heart disease Sister     Coronary artery disease Family     Diabetes Family     Hypertension Family     Cancer Family     Stroke Family        Physical Exam:     Vitals:   Blood Pressure: 141/76 (08/13/18 2256)  Pulse: 63 (08/13/18 2256)  Temperature: 98 °F (36 7 °C) (08/13/18 1845)  Temp Source: Oral (08/13/18 1845)  Respirations: 20 (08/13/18 2256)  Weight - Scale: 102 kg (224 lb) (08/13/18 1845)  SpO2: 99 % (08/13/18 2256)    Physical Exam   Constitutional: She is oriented to person, place, and time  She appears well-developed and well-nourished  HENT:   Head: Normocephalic and atraumatic  Right Ear: External ear normal    Left Ear: External ear normal    Nose: Nose normal    Mouth/Throat: Oropharynx is clear and moist    Eyes: Conjunctivae and EOM are normal  Pupils are equal, round, and reactive to light  Neck: Normal range of motion  Cardiovascular: Normal rate and regular rhythm  Murmur heard  Pulmonary/Chest: She has decreased breath sounds  Abdominal: Soft  Bowel sounds are normal    Musculoskeletal: Normal range of motion  She exhibits no edema  Neurological: She is alert and oriented to person, place, and time  Skin: Skin is warm and dry  Psychiatric: She has a normal mood and affect  Her behavior is normal  Judgment and thought content normal    Vitals reviewed  Additional Data:     Lab Results: I have personally reviewed pertinent reports  Results from last 7 days  Lab Units 08/13/18  1319   WBC Thousand/uL 9 20   HEMOGLOBIN g/dL 9 6*   HEMATOCRIT % 32 9*   PLATELETS Thousands/uL 426*   NEUTROS PCT % 58   LYMPHS PCT % 22   MONOS PCT % 11   EOS PCT % 8*       Results from last 7 days  Lab Units 08/13/18  1319   SODIUM mmol/L 141   POTASSIUM mmol/L 4 5   CHLORIDE mmol/L 104   CO2 mmol/L 29   BUN mg/dL 22   CREATININE mg/dL 0 75   CALCIUM mg/dL 9 8   TOTAL PROTEIN g/dL 7 6   BILIRUBIN TOTAL mg/dL 0 20   ALK PHOS U/L 86   ALT U/L 18   AST U/L 18   GLUCOSE RANDOM mg/dL 94                   Imaging: I have personally reviewed pertinent reports  CTA ED chest PE study   Final Result by Alice Alejandre MD (08/13 2211)      No evidence of pulmonary embolism        Workstation performed: [id number]             EKG, Pathology, and Other Studies Reviewed on Admission: · EKG: Sinus rhythm with first-degree AV block    Allscripts / Epic Records Reviewed: Yes     ** Please Note: This note has been constructed using a voice recognition system   **

## 2018-08-14 NOTE — ASSESSMENT & PLAN NOTE
Patient has been getting progressively worse for the last 3 weeks  Compatible with acute on chronic diastolic heart failure  Continue the IV Lasix, Pravachol, losartan  2D echo in process  Cardiology consulted await input  Strict input output, daily weights fluid restriction to less than 1800 cc per day  Maintain telemetry

## 2018-08-14 NOTE — PROGRESS NOTES
Progress Note - Cristino Kimball 1940, 65 y o  female MRN: [id number]  Unit/Bed#: ED 12 Encounter: [id number]  Primary Care Provider: MABEL Estevez   Date and time admitted to hospital: 8/13/2018  7:11 PM        * GALINDO (dyspnea on exertion)   Assessment & Plan    Patient has been getting progressively worse for the last 3 weeks  Compatible with acute on chronic diastolic heart failure  Cycle 3 sets of troponins, TTE  Continue the IV Lasix, Pravachol, losartan  TTE in process  Cardiology consulted await input  Strict input output, daily weights fluid restriction to less than 1800 cc per day  Maintain telemetry  Acute on chronic diastolic heart failure [facility])   Assessment & Plan    Refer above  Cautious IV diuresis in view of recent IV dye exposure  BMP in a m  Hypertension   Assessment & Plan    Blood pressure is uncontrolled on arrival in the ED  Probably contributing to CHF exacerbation  Patient continue with losartan, Lasix  Pulmonary hypertension ([address] )   Assessment & Plan    Continue as per Cardiology  Aortic stenosis, moderate   Assessment & Plan    As per last echo in March 2017 -- There was moderate stenosis  Peak gradient 52 mm Hg and mean 32 mm Hg  Follow-up TTE results  Hyperlipidemia   Assessment & Plan    Check fasting lipid profile  Continue Pravachol  Hypothyroid   Assessment & Plan    TSH checked this month within normal limits  Continue with levothyroxine at current dosage  Morbid obesity ([address] )   Assessment & Plan    Lifestyle modification and dietary modifications  Consult nutrition services  ______________________________________________________________________    SUBJECTIVE:   Patient seen and examined  She still short of breath requiring 3-4 L of oxygen  History compatible with acute on chronic CHF exacerbation  Patient has been getting progressively dyspneic over the last 3 weeks  Occasional cough  History significant for orthopnea  She denies any chest pain, palpitations, dizziness, nausea, vomiting, fevers, chills  Her last weight is 224 lbs  OBJECTIVE:     Vitals:   HR:  [58-72] 58  Resp:  [15-21] 21  BP: (137-181)/(60-81) 137/63  SpO2:  [81 %-99 %] 97 %  Temp (24hrs), Av 3 °F (36 8 °C), Min:97 9 °F (36 6 °C), Max:99 °F (37 2 °C)  Current: Temperature: 97 9 °F (36 6 °C)  No intake or output data in the 24 hours ending 18 0915    Physical Exam:   GENERAL: AAO x 3, morbid obesity  HEENT: atraumatic, normocephalic  Oral mucosa moist, no icterus, pallor  PERRLA +  Neck supple, no JVD, no lymphadenopathy, no thryomegaly  CHEST: B/L breath sounds heard, bilateral basal crackles  CVS: S1, S2   ABDOMEN: Soft/obese/NT/BS heard  NEUROLOGICAL: CN II -XI grossly intact  No focal motor or sensory deficits  No signs of meningeal irritation or cerebellar dysfunction    EXTREMITIES: Bilateral pitting pedal edema    LABS:  Results for Sabiha Brennan (MRN [id number]) as of 2018 09:09   2018 06:00   eGFR 65   Sodium 142   Potassium 4 1   Chloride 105   CO2 30   Anion Gap 7   BUN 20   Creatinine 0 86   Glucose 113   Calcium 9 4   WBC 8 77   RBC 4 15   Hemoglobin 9 6 (L)   Hematocrit 32 6 (L)   MCV 79 (L)   MCH 23 1 (L)   MCHC 29 4 (L)   RDW 19 1 (H)   Platelets 275 (H)   MPV 8 5 (L)   nRBC 0   Neutrophils Relative 51   Lymphocytes Relative 27   Monocytes Relative 12   Eosinophils Relative 8 (H)   Basophils Relative 1   Neutrophils Absolute 4 44   Lymphocytes Absolute 2 40   Monocytes Absolute 1 09   Eosinophils Absolute 0 72 (H)   Basophils Absolute 0 07   Immat GRANS % 1   Immature Grans Absolute 0 05     Scheduled Meds:    Current Facility-Administered Medications:  acetaminophen 650 mg Oral Q6H PRN Daun Luria, PA-C   albuterol 2 5 mg Nebulization Q6H PRN Daun Luria, PA-C   aspirin 81 mg Oral Daily Daun Luria, PA-C   fluticasone 1 spray Nasal Daily Daun Luria, PA-C fluticasone-vilanterol 1 puff Inhalation Daily Myron Chowdhury, PA-C   furosemide 40 mg Intravenous Daily Kari Martinez MD   heparin (porcine) 5,000 Units Subcutaneous [facility] Myron Chowdhury, PA-C   levothyroxine 50 mcg Oral Early Morning Myron Chowdhury, PA-C   loratadine 10 mg Oral Daily Myron Chowdhury, PA-C   losartan 25 mg Oral Daily Myron Chowdhury, PA-C   ondansetron 4 mg Intravenous Q6H PRN Myron Chowdhury, PA-C   oxybutynin 5 mg Oral Daily Myron Chowdhury, PA-C   pantoprazole 20 mg Oral Early Morning Myron Chowdhury, PA-C   pravastatin 80 mg Oral Daily With [employer] , PA-C   sertraline 50 mg Oral Daily Myron Chowdhury, PA-C   temazepam 15 mg Oral HS Myron Chowdhury, PA-C       PRN Meds:    acetaminophen    albuterol    ondansetron    VTE prophylaxis: Heparin    Education and Discussions with Family / Patient: Patient  Current Length of Stay: Day 1     Current Patient Status: Inpatient     Certification Statement: The patient will continue to require additional inpatient hospital stay due to ongoing goals of care discussion      Discharge Plan / Estimated Discharge Date:  Plan for today discussed with  RN      Code Status: Level 1 - Full Code    Time Spent for Care: 20 minutes   More than 50% of total time spent on counseling and coordination of care as described above  Eleuterio Cervantes MD  HOSPITALIST SERVICES  8/14/2018    PLEASE NOTE:  This encounter was completed utilizing the [employer] Direct Speech Voice Recognition Software  Grammatical errors, random word insertions, pronoun errors and incomplete sentences are occasional consequences of the system due to software limitations, ambient noise and hardware issues  These may be missed by proof reading prior to affixing electronic signature   Any questions or concerns about the content, text or information contained within the body of this dictation should be directly addressed to the physician for clarification  Please do not hesitate to call me directly if you have any any questions or concerns

## 2018-08-14 NOTE — ASSESSMENT & PLAN NOTE
[address] Cardiology  Echo pending  Consult pulmonology  Patient requiring O2 via nasal cannula to maintain oxygen saturation

## 2018-08-15 LAB
ANION GAP SERPL CALCULATED.3IONS-SCNC: 6 MMOL/L (ref 4–13)
BASOPHILS # BLD AUTO: 0.07 THOUSANDS/ΜL (ref 0–0.1)
BASOPHILS NFR BLD AUTO: 1 % (ref 0–1)
BUN SERPL-MCNC: 25 MG/DL (ref 5–25)
CALCIUM SERPL-MCNC: 9.6 MG/DL (ref 8.3–10.1)
CHLORIDE SERPL-SCNC: 105 MMOL/L (ref 100–108)
CHOLEST SERPL-MCNC: 153 MG/DL (ref 50–200)
CO2 SERPL-SCNC: 31 MMOL/L (ref 21–32)
CREAT SERPL-MCNC: 0.83 MG/DL (ref 0.6–1.3)
EOSINOPHIL # BLD AUTO: 0.64 THOUSAND/ΜL (ref 0–0.61)
EOSINOPHIL NFR BLD AUTO: 7 % (ref 0–6)
ERYTHROCYTE [DISTWIDTH] IN BLOOD BY AUTOMATED COUNT: 19 % (ref 11.6–15.1)
GFR SERPL CREATININE-BSD FRML MDRD: 68 ML/MIN/1.73SQ M
GLUCOSE SERPL-MCNC: 110 MG/DL (ref 65–140)
HCT VFR BLD AUTO: 33.2 % (ref 34.8–46.1)
HDLC SERPL-MCNC: 59 MG/DL (ref 40–60)
HGB BLD-MCNC: 9.8 G/DL (ref 11.5–15.4)
IMM GRANULOCYTES # BLD AUTO: 0.04 THOUSAND/UL (ref 0–0.2)
IMM GRANULOCYTES NFR BLD AUTO: 1 % (ref 0–2)
LDLC SERPL CALC-MCNC: 72 MG/DL (ref 0–100)
LYMPHOCYTES # BLD AUTO: 2.13 THOUSANDS/ΜL (ref 0.6–4.47)
LYMPHOCYTES NFR BLD AUTO: 24 % (ref 14–44)
MCH RBC QN AUTO: 23.3 PG (ref 26.8–34.3)
MCHC RBC AUTO-ENTMCNC: 29.5 G/DL (ref 31.4–37.4)
MCV RBC AUTO: 79 FL (ref 82–98)
MONOCYTES # BLD AUTO: 0.99 THOUSAND/ΜL (ref 0.17–1.22)
MONOCYTES NFR BLD AUTO: 11 % (ref 4–12)
NEUTROPHILS # BLD AUTO: 4.88 THOUSANDS/ΜL (ref 1.85–7.62)
NEUTS SEG NFR BLD AUTO: 56 % (ref 43–75)
NONHDLC SERPL-MCNC: 94 MG/DL
NRBC BLD AUTO-RTO: 0 /100 WBCS
PLATELET # BLD AUTO: 429 THOUSANDS/UL (ref 149–390)
PMV BLD AUTO: 9 FL (ref 8.9–12.7)
POTASSIUM SERPL-SCNC: 4.3 MMOL/L (ref 3.5–5.3)
RBC # BLD AUTO: 4.21 MILLION/UL (ref 3.81–5.12)
SODIUM SERPL-SCNC: 142 MMOL/L (ref 136–145)
TRIGL SERPL-MCNC: 112 MG/DL
WBC # BLD AUTO: 8.75 THOUSAND/UL (ref 4.31–10.16)

## 2018-08-15 PROCEDURE — 94760 N-INVAS EAR/PLS OXIMETRY 1: CPT

## 2018-08-15 PROCEDURE — G8987 SELF CARE CURRENT STATUS: HCPCS

## 2018-08-15 PROCEDURE — 99232 SBSQ HOSP IP/OBS MODERATE 35: CPT | Performed by: GENERAL PRACTICE

## 2018-08-15 PROCEDURE — 97163 PT EVAL HIGH COMPLEX 45 MIN: CPT

## 2018-08-15 PROCEDURE — 94660 CPAP INITIATION&MGMT: CPT

## 2018-08-15 PROCEDURE — G8988 SELF CARE GOAL STATUS: HCPCS

## 2018-08-15 PROCEDURE — 97166 OT EVAL MOD COMPLEX 45 MIN: CPT

## 2018-08-15 PROCEDURE — 99232 SBSQ HOSP IP/OBS MODERATE 35: CPT | Performed by: INTERNAL MEDICINE

## 2018-08-15 PROCEDURE — G8979 MOBILITY GOAL STATUS: HCPCS

## 2018-08-15 PROCEDURE — 80061 LIPID PANEL: CPT | Performed by: INTERNAL MEDICINE

## 2018-08-15 PROCEDURE — 85025 COMPLETE CBC W/AUTO DIFF WBC: CPT | Performed by: INTERNAL MEDICINE

## 2018-08-15 PROCEDURE — 80048 BASIC METABOLIC PNL TOTAL CA: CPT | Performed by: INTERNAL MEDICINE

## 2018-08-15 PROCEDURE — G8978 MOBILITY CURRENT STATUS: HCPCS

## 2018-08-15 RX ADMIN — PRAVASTATIN SODIUM 80 MG: 80 TABLET ORAL at 16:59

## 2018-08-15 RX ADMIN — LEVOTHYROXINE SODIUM 50 MCG: 50 TABLET ORAL at 05:15

## 2018-08-15 RX ADMIN — SERTRALINE HYDROCHLORIDE 50 MG: 50 TABLET ORAL at 08:56

## 2018-08-15 RX ADMIN — OXYBUTYNIN CHLORIDE 5 MG: 5 TABLET, EXTENDED RELEASE ORAL at 08:56

## 2018-08-15 RX ADMIN — FUROSEMIDE 40 MG: 10 INJECTION, SOLUTION INTRAMUSCULAR; INTRAVENOUS at 08:56

## 2018-08-15 RX ADMIN — FLUTICASONE PROPIONATE 1 SPRAY: 50 SPRAY, METERED NASAL at 08:57

## 2018-08-15 RX ADMIN — PANTOPRAZOLE SODIUM 20 MG: 20 TABLET, DELAYED RELEASE ORAL at 05:15

## 2018-08-15 RX ADMIN — TEMAZEPAM 15 MG: 15 CAPSULE ORAL at 21:06

## 2018-08-15 RX ADMIN — LORATADINE 10 MG: 10 TABLET ORAL at 08:56

## 2018-08-15 RX ADMIN — HEPARIN SODIUM 5000 UNITS: 5000 INJECTION, SOLUTION INTRAVENOUS; SUBCUTANEOUS at 16:59

## 2018-08-15 RX ADMIN — LOSARTAN POTASSIUM 25 MG: 25 TABLET, FILM COATED ORAL at 08:56

## 2018-08-15 RX ADMIN — ASPIRIN 81 MG: 81 TABLET, COATED ORAL at 08:56

## 2018-08-15 RX ADMIN — FLUTICASONE FUROATE AND VILANTEROL TRIFENATATE 1 PUFF: 100; 25 POWDER RESPIRATORY (INHALATION) at 08:57

## 2018-08-15 RX ADMIN — HEPARIN SODIUM 5000 UNITS: 5000 INJECTION, SOLUTION INTRAVENOUS; SUBCUTANEOUS at 21:06

## 2018-08-15 NOTE — PLAN OF CARE
CARDIOVASCULAR - ADULT     Absence of cardiac dysrhythmias or at baseline rhythm Progressing        DISCHARGE PLANNING     Discharge to home or other facility with appropriate resources Progressing        INFECTION - ADULT     Absence or prevention of progression during hospitalization Progressing        Knowledge Deficit     Patient/family/caregiver demonstrates understanding of disease process, treatment plan, medications, and discharge instructions Progressing        PAIN - ADULT     Verbalizes/displays adequate comfort level or baseline comfort level Progressing        Potential for Falls     Patient will remain free of falls Progressing        RESPIRATORY - ADULT     Achieves optimal ventilation and oxygenation Progressing        SAFETY ADULT     Patient will remain free of falls Progressing

## 2018-08-15 NOTE — PLAN OF CARE
Problem: OCCUPATIONAL THERAPY ADULT  Goal: Performs self-care activities at highest level of function for planned discharge setting  See evaluation for individualized goals  Treatment Interventions: ADL retraining, Endurance training, Patient/family training, Functional transfer training, Equipment evaluation/education, Compensatory technique education, Continued evaluation, Energy conservation, Activityengagement          See flowsheet documentation for full assessment, interventions and recommendations  Limitation: Decreased ADL status, Decreased endurance, Decreased self-care trans, Decreased high-level ADLs  Prognosis: Good  Assessment: Patient is a 65 y o  female seen for OT evaluation s/p admit to [id number] [place] [address] on 8/13/2018 w/GALINDO (dyspnea on exertion)  Patient presented to ED w/ SOB w/ pain typical exertional activity  Commorbidities affecting patient's functional performance at time of assessment include:pulmonary HTN, HTN, reactive airway disease, CHF, morbid obesity, JANICE on CPAP  Orders placed for OT evaluation and treatment  Performed at least two patient identifiers during session including name and wristband  Prior to admission, Patient reporting independent with ADLs/ IADLs, ambulatory with   Patient lives in a one story house with ramped entrance, patient is the main caregiver for her sister, also have a good support system from [facility]  Patient drives and completes IADLs independently  Personal factors affecting patient at time of initial evaluation include: difficulty performing ADLs and difficulty performing IADLs  Upon evaluation, patient requires supervision and set up assist for UB ADLs, minimal  assist for LB ADLs, transfers and functional ambulation in room and bathroom with supervision and minimal  assist, with the use of Rolling Walker   Occupational performance is affected by the following deficits: dynamic sit/ stand balance deficit with poor standing tolerance time for self care and functional mobility, decreased activity tolerance and postural control and postural alignment deficit, requiring external assistance to complete transitional movements  Therapist completed expanded review of medical records and additional review of physical, cognitive or psychosocial history, clinical examination identifying 3-5 performance deficits, clinical decision making of a moderate complexity, consistent with moderate complexity level evaluation  Patient to benefit from continued Occupational Therapy treatment while in the hospital to address deficits as defined above and maximize level of functional independence with ADLs and functional mobility  Occupational Performance areas to address include: bathing/ shower, dressing, toilet hygiene, transfer to all surfaces, functional mobility, emergency response, health maintenance, medication routine/ management, IADLs: safety procedures, IADLs: meal prep/ clean up, Leisure Participation and Social participation  From OT standpoint, recommendation at time of d/c would be Home with daily checks, [facility] and Home OT         OT Discharge Recommendation: Home OT  OT - OK to Discharge: No (Home OT/ Home with daily checks/ s/p cardiac cath)

## 2018-08-15 NOTE — PROGRESS NOTES
General Cardiology   Progress Note   Constantino Hayden 65 y o  female MRN: [id number]  Unit/Bed#: -01 Encounter: [id number]        Subjective:   SOB improved  Pending L and R heart catheterization today  Objective:   Vitals:  Vitals:    08/15/18 0714   BP: 136/65   Pulse: 60   Resp: 17   Temp: 97 8 °F (36 6 °C)   SpO2: 94%       Body mass index is 52 06 kg/m²  Systolic (48VTE), DBE:704 , Min:116 , RCX:610     Diastolic (76KHT), SJI:32, Min:57, Max:79      Intake/Output Summary (Last 24 hours) at 08/15/18 1109  Last data filed at 08/14/18 1700   Gross per 24 hour   Intake              340 ml   Output              400 ml   Net              -60 ml     Weight (last 2 days)     Date/Time   Weight    08/14/18 1500  102 (224)    08/13/18 1845  102 (224)                PHYSICAL EXAMS:  General:  +obese  Patient is not in acute distress, laying in the bed comfortably, awake, alert responding to commands  Head: Normocephalic, Atraumatic  HEENT: White sclera, pink conjunctiva,  PERRLA,pharynx benign  Neck:  Supple, no neck vein distention, carotids+2/+2 no bruits, thyromegaly, adenopathy  Respiratory: clear to P/A  Cardiovascular:  PMI normal, S1-S2 normal, No  Murmurs, thrills, gallops, rubs   Regular rhythm  GI:  Abdomen soft nontender   No hepatosplenomegaly, adenopathy, ascites,or rebound tenderness  Extremities: +trace edema, normal pulses, no calf tenderness, no joint deformities, no venous disease   Integument:  No skin rashes or ulceration  Lymphatic:  No cervical or inguinal lymphadenopathy  Neurologic:  Patient is awake alert, responding to command, well-oriented to time and place and person moving all extremities      LABORATORY RESULTS:    Results from last 7 days  Lab Units 08/14/18  1216 08/14/18  0953 08/13/18  1946   TROPONIN I ng/mL <0 02 <0 02 <0 02     CBC with diff:   Results from last 7 days  Lab Units 08/15/18  0442 08/14/18  0600 08/13/18  1319   WBC Thousand/uL 8 75 8 77 9 20   HEMOGLOBIN g/dL 9  8* 9 6* 9 6*   HEMATOCRIT % 33 2* 32 6* 32 9*   MCV fL 79* 79* 79*   PLATELETS Thousands/uL 429* 416* 426*   MCH pg 23 3* 23 1* 22 9*   MCHC g/dL 29 5* 29 4* 29 2*   RDW % 19 0* 19 1* 18 8*   MPV fL 9 0 8 5* 8 8*   NRBC AUTO /100 WBCs 0 0 0       CMP:  Results from last 7 days  Lab Units 08/15/18  0442 08/14/18  0600 08/13/18  1319   SODIUM mmol/L 142 142 141   POTASSIUM mmol/L 4 3 4 1 4 5   CHLORIDE mmol/L 105 105 104   CO2 mmol/L 31 30 29   ANION GAP mmol/L 6 7 8   BUN mg/dL 25 20 22   CREATININE mg/dL 0 83 0 86 0 75   GLUCOSE RANDOM mg/dL 110 113 94   CALCIUM mg/dL 9 6 9 4 9 8   AST U/L  --   --  18   ALT U/L  --   --  18   ALK PHOS U/L  --   --  86   TOTAL PROTEIN g/dL  --   --  7 6   BILIRUBIN TOTAL mg/dL  --   --  0 20   EGFR ml/min/1 73sq m 68 65 77       BMP:  Results from last 7 days  Lab Units 08/15/18  0442 08/14/18  0600 08/13/18  1319   SODIUM mmol/L 142 142 141   POTASSIUM mmol/L 4 3 4 1 4 5   CHLORIDE mmol/L 105 105 104   CO2 mmol/L 31 30 29   BUN mg/dL 25 20 22   CREATININE mg/dL 0 83 0 86 0 75   GLUCOSE RANDOM mg/dL 110 113 94   CALCIUM mg/dL 9 6 9 4 9 8           Results from last 7 days  Lab Units 08/13/18  1946 08/13/18  1319   NT-PRO BNP pg/mL 207 105                        Lipid Profile:   Lab Results   Component Value Date    CHOL 153 08/15/2018    CHOL 133 06/05/2018     Lab Results   Component Value Date    HDL 59 08/15/2018    HDL 65 (H) 06/05/2018     Lab Results   Component Value Date    LDLCALC 72 08/15/2018    LDLCALC 51 06/05/2018     Lab Results   Component Value Date    TRIG 112 08/15/2018    TRIG 83 06/05/2018       Cardiac testing:   Results for orders placed during the hospital encounter of 08/13/18   Echo complete with contrast if indicated    Narrative [address] [place] [address] [place] [address]  [phone]    Transthoracic Echocardiogram  2D, M-mode, Doppler, and Color Doppler    Study date:  14-Aug-2018    Patient: Aria Hank  MR number: [id number]  Account number: [de-identified]  : 1940  Age: 65 years  Gender: Female  Status: Inpatient  Location: Bedside  Height: 57 in  Weight: 224 lb  BP: 139/ 60 mmHg    Indications: Dyspnea, shortness of breath    Diagnoses: R06 00 - Dyspnea, unspecified    Sonographer:  Della Hooker RDCS  Interpreting Physician:  May Cloud MD  Referring Physician:  Bandar Joseph PA-C  Group:  [facility]    SUMMARY    LEFT VENTRICLE:  Systolic function was normal  Ejection fraction was estimated in the range of 55 % to 65 %  There were no regional wall motion abnormalities  There was mild concentric hypertrophy  Doppler parameters were consistent with abnormal left ventricular relaxation (grade 1 diastolic dysfunction)  LEFT ATRIUM:  The atrium was mildly dilated  MITRAL VALVE:  There was mild annular calcification  AORTIC VALVE:  The valve was not visualized well enough to rule out a bicuspid morphology  Leaflets exhibited marked calcification and markedly reduced cuspal separation  Transaortic velocity was increased due to valvular stenosis  There was moderate to severe stenosis  There was mild regurgitation  TRICUSPID VALVE:  There was mild regurgitation  Estimated peak PA pressure was 40 mmHg  HISTORY: PRIOR HISTORY: Risk factors: CAD, hypertension, hypercholesterolemia, JANICE and morbid obesity  PROCEDURE: The procedure was performed at the bedside  This was a routine study  The transthoracic approach was used  The study included complete 2D imaging, M-mode, complete spectral Doppler, and color Doppler  The heart rate was 66 bpm,  at the start of the study  Images were obtained from the parasternal, apical, subcostal, and suprasternal notch acoustic windows  Echocardiographic views were limited due to decreased penetration and lung interference  Image quality was  adequate      LEFT VENTRICLE: Size was normal  Systolic function was normal  Ejection fraction was estimated in the range of 55 % to 65 %  There were no regional wall motion abnormalities  There was mild concentric hypertrophy  DOPPLER: Doppler  parameters were consistent with abnormal left ventricular relaxation (grade 1 diastolic dysfunction)  RIGHT VENTRICLE: The size was normal  Systolic function was normal  Wall thickness was normal     LEFT ATRIUM: The atrium was mildly dilated  RIGHT ATRIUM: Size was normal     MITRAL VALVE: There was mild annular calcification  Valve structure was normal  There was normal leaflet separation  DOPPLER: The transmitral velocity was within the normal range  There was no evidence for stenosis  There was no  regurgitation  AORTIC VALVE: The valve was not visualized well enough to rule out a bicuspid morphology  Leaflets exhibited marked calcification and markedly reduced cuspal separation  DOPPLER: Transaortic velocity was increased due to valvular stenosis  There was moderate to severe stenosis  There was mild regurgitation  TRICUSPID VALVE: The valve structure was normal  There was normal leaflet separation  DOPPLER: The transtricuspid velocity was within the normal range  There was no evidence for stenosis  There was mild regurgitation  Estimated peak PA  pressure was 40 mmHg  PULMONIC VALVE: Leaflets exhibited normal thickness, no calcification, and normal cuspal separation  DOPPLER: The transpulmonic velocity was within the normal range  There was no regurgitation  PERICARDIUM: There was no pericardial effusion  The pericardium was normal in appearance  AORTA: The root exhibited normal size  SYSTEMIC VEINS: IVC: The inferior vena cava was normal in size and course   Respirophasic changes were normal     SYSTEM MEASUREMENT TABLES    2D  %FS: 36 9 %  Ao Diam: 3 2 cm  EDV(Teich): 89 2 ml  EF(Teich): 67 %  ESV(Teich): 29 5 ml  IVSd: 1 2 cm  LA Area: 25 9 cm2  LA Diam: 4 1 cm  LVEDV MOD A4C: 83 7 ml  LVEF MOD A4C: 83 2 %  LVESV MOD A4C: 14 ml  LVIDd: 4 4 cm  LVIDs: 2 8 cm  LVLd A4C: 8 2 cm  LVLs A4C: 5 9 cm  LVOT Diam: 2 1 cm  LVPWd: 1 2 cm  RA Area: 18 4 cm2  RVIDd: 4 4 cm  SV MOD A4C: 69 6 ml  SV(Teich): 59 7 ml    CW  AR Dec [place]: 2 4 m/s2  AR Dec Time: 1508 3 ms  AR PHT: 437 4 ms  AR Vmax: 3 6 m/s  AR maxP 4 mmHg  AV Env  Ti: 296 9 ms  AV VTI: 90 6 cm  AV Vmax: 4 1 m/s  AV Vmean: 3 1 m/s  AV maxP 1 mmHg  AV meanP 1 mmHg  TR Vmax: 3 3 m/s  TR maxP 3 mmHg    MM  TAPSE: 2 4 cm    PW  VASU (VTI): 1 3 cm2  VASU Vmax: 1 cm2  E': 0 1 m/s  E/E': 18  LVOT Env  Ti: 388 2 ms  LVOT VTI: 34 7 cm  LVOT Vmax: 1 3 m/s  LVOT Vmean: 0 9 m/s  LVOT maxP 6 mmHg  LVOT meanPG: 3 8 mmHg  LVSV Dopp: 114 6 ml  MV A Jose: 1 4 m/s  MV Dec Chariton: 1 8 m/s2  MV DecT: 541 5 ms  MV E Jose: 1 m/s  MV E/A Ratio: 0 7  MV PHT: 157 ms  MVA By PHT: 1 4 cm2    [facility]redited [facility]    Prepared and electronically signed by    Orquidea Patterson MD  Signed 14-Aug-2018 18:35:14         Meds/Allergies   all current active meds have been reviewed and current meds:   Current Facility-Administered Medications   Medication Dose Route Frequency    acetaminophen (TYLENOL) tablet 650 mg  650 mg Oral Q6H PRN    albuterol inhalation solution 2 5 mg  2 5 mg Nebulization Q6H PRN    aspirin (ECOTRIN LOW STRENGTH) EC tablet 81 mg  81 mg Oral Daily    fluticasone (FLONASE) 50 mcg/act nasal spray 1 spray  1 spray Nasal Daily    fluticasone-vilanterol (BREO ELLIPTA) 100-25 mcg/inh inhaler 1 puff  1 puff Inhalation Daily    furosemide (LASIX) injection 40 mg  40 mg Intravenous Daily    heparin (porcine) subcutaneous injection 5,000 Units  5,000 Units Subcutaneous Q8H [address]    levothyroxine tablet 50 mcg  50 mcg Oral Early Morning    loratadine (CLARITIN) tablet 10 mg  10 mg Oral Daily    losartan (COZAAR) tablet 25 mg  25 mg Oral Daily    ondansetron (ZOFRAN) injection 4 mg  4 mg Intravenous Q6H PRN    oxybutynin (DITROPAN-XL) 24 hr tablet 5 mg  5 mg Oral Daily    pantoprazole (PROTONIX) EC tablet 20 mg  20 mg Oral Early Morning    pravastatin (PRAVACHOL) tablet 80 mg  80 mg Oral Daily With Dinner    sertraline (ZOLOFT) tablet 50 mg  50 mg Oral Daily    temazepam (RESTORIL) capsule 15 mg  15 mg Oral HS     Prescriptions Prior to Admission   Medication    aspirin (ECOTRIN LOW STRENGTH) 81 mg EC tablet    b complex vitamins capsule    calcium carbonate (OS-SYLVESTER) 600 MG tablet    Fesoterodine Fumarate ER (TOVIAZ) 8 MG TB24    fluticasone (FLONASE) 50 mcg/act nasal spray    fluticasone-vilanterol (BREO ELLIPTA) 100-25 mcg/inh inhaler    levocetirizine (XYZAL) 5 MG tablet    levothyroxine 50 mcg tablet    loratadine (CLARITIN) 10 mg tablet    losartan (COZAAR) 50 mg tablet    omeprazole (PriLOSEC) 40 MG capsule    sertraline (ZOLOFT) 50 mg tablet    simvastatin (ZOCOR) 40 mg tablet    temazepam (RESTORIL) 15 mg capsule    albuterol (2 5 mg/3 mL) 0 083 % nebulizer solution            Assessment/Plan:  1  SOB:  -likely attributed to moderate to severe aortic stenosis  -JVD difficult to assess given thick neck  -, although likely inaccurate given morbid obesity  -serial troponins negative x3  -CXR negative for acute consolidation or congestion, CTA chest negative for pulmonary embolism  -Stress test November 2017 unremarkable for ischemia  -ECHO this admission shows EF 55-65%, no regional wall motion abnormalities, grade 1 diastolic dysfunction, moderate to severe AS, estimated peak PA pressure 40 mmHg  -Continue diuresis  -will plan for left and right heart cardiac catheterization today  Procedure of cardiac catheterization explained, risks and benefits discussed  Adverse outcomes include 1% risk of bleeding, infection, stroke, myocardial infarction, kidney injury  All questions answered, informed consent obtained  NPO      2  Aortic stenosis:  Moderate to severe per ECHO this admission  Could be contributing to SOB      3  JANICE:  On CPAP     4  Obesity:  Weight loss counseling      5  Hyperlipidemia:  Continue statin      6  Hypertension:  Stable, continue present regimen  ** Please Note: Dragon 360 Dictation voice to text software may have been used in the creation of this document   **

## 2018-08-15 NOTE — PROGRESS NOTES
Progress Note - Pulmonary   Jair Louis 65 y o  female MRN: [id number]  Unit/Bed#: -01 Encounter: [id number]    Assessment:  1  Dyspnea on exertion  2  Pulmonary hypertension  3  JANICE on CPAP  4  Morbid obesity    Plan:  1  Dyspnea on exertion   -multifactorial in the setting of aortic stenosis, pulmonary hypertension, CHF?, deconditioning  -currently saturating 94% on 3 L via nasal cannula, documented room air saturation of 81-93%  Patient does not use oxygen at home  Would need to complete ambulatory pulse ox prior to discharge  Hypoxia possibly related to pulmonary HTN  -echo with worsening aortic valve stenosis from moderate to moderate/severe  Plan for [facility] later today  Potential for TAVR pending results  Normal RV size and function, LVEF of 60% with grade 1 diastolic dysfunction  Estimated peak PA pressure of 40 mmHg  Mild dilation of LA compared to prior  Pulmonary hypertension likely combination of left heart disease/aortic stenosis + severe sleep apnea now treated with CPAP  -Patient has been treated for severe JANICE with CPAP and has been compliant with this over the last few months   -pro BNP of 207, normal troponin  -IV lasix per cardiology, currently on 40 mg IV daily; I&Os; salt restriction   -agree with proceeding with right heart catheterization/left heart catheterization  -normal RF, BOO with elevated ESR/CRP  Pending results of RHC, will consider additional testing     -will discontinue Breo  2  JANICE on CPAP  -will order CPAP at a pressure of 9 cm H2O  -reviewed last compliance report with average pressure around 8 6 cm H2O  3  Morbid obesity  -strongly recommend weight loss  -discussed diet/exercise  -consider nutrition evaluation    Pulmonary will sign off, please re-consult as needed     Chief Complaint:   SOB    Subjective:   Patient states her shortness of breath has gotten better with administration of the IV Lasix  She denies any fever, chills, chest pain, cough  She is nervous for the cardiac catheterization that is to be performed later today  Objective:     Vitals: Blood pressure 127/60, pulse 61, temperature 98 1 °F (36 7 °C), temperature source Oral, resp  rate 17, height 4' 7" (1 397 m), weight 102 kg (224 lb), SpO2 94 %  ,Body mass index is 52 06 kg/m²  Intake/Output Summary (Last 24 hours) at 08/15/18 1209  Last data filed at 08/14/18 1700   Gross per 24 hour   Intake              340 ml   Output              400 ml   Net              -60 ml       Invasive Devices     Peripheral Intravenous Line            Peripheral IV 08/13/18 Left Antecubital 1 day                Physical Exam: /60 (BP Location: Right arm)   Pulse 61   Temp 98 1 °F (36 7 °C) (Oral)   Resp 17   Ht 4' 7" (1 397 m)   Wt 102 kg (224 lb)   SpO2 94%   BMI 52 06 kg/m²   General appearance: alert and oriented, in no acute distress and cooperative  Head: Normocephalic, without obvious abnormality, atraumatic  Lungs: Diminished breath sounds bilaterally with no wheezes or rhonchi  Heart: regular rate and rhythm, S1, S2 normal, no murmur, click, rub or gallop  Extremities: extremities normal, warm and well-perfused; no cyanosis, clubbing, or edema  Skin: Skin color, texture, turgor normal  No rashes or lesions  Neurologic: Mental status: Alert, oriented, thought content appropriate     Labs: I have personally reviewed pertinent lab results  , CBC:   Lab Results   Component Value Date    WBC 8 75 08/15/2018    HGB 9 8 (L) 08/15/2018    HCT 33 2 (L) 08/15/2018    MCV 79 (L) 08/15/2018     (H) 08/15/2018    MCH 23 3 (L) 08/15/2018    MCHC 29 5 (L) 08/15/2018    RDW 19 0 (H) 08/15/2018    MPV 9 0 08/15/2018    NRBC 0 08/15/2018   , CMP:   Lab Results   Component Value Date     08/15/2018    K 4 3 08/15/2018     08/15/2018    CO2 31 08/15/2018    ANIONGAP 6 08/15/2018    BUN 25 08/15/2018    CREATININE 0 83 08/15/2018    GLUCOSE 110 08/15/2018    CALCIUM 9 6 08/15/2018 EGFR 68 08/15/2018     Imaging and other studies: I have personally reviewed pertinent reports

## 2018-08-15 NOTE — PLAN OF CARE
Problem: PHYSICAL THERAPY ADULT  Goal: Performs mobility at highest level of function for planned discharge setting  See evaluation for individualized goals  Treatment/Interventions: Functional transfer training, LE strengthening/ROM, Therapeutic exercise, Endurance training, Patient/family training, Equipment eval/education, Bed mobility, Gait training, Spoke to nursing  Equipment Recommended: Yuridia Winn       See flowsheet documentation for full assessment, interventions and recommendations  Prognosis: Good  Problem List: Decreased strength, Decreased endurance, Impaired balance, Decreased mobility, Obesity  Assessment: Pt is 65 y o  female seen for PT evaluation on 8/15/2018 s/p admit to [facility] on 8/13/2018 w/ GALINDO (dyspnea on exertion)- pt presented to ED w/ SOB w/ pain typical exertional activity  PT consulted to assess pt's functional mobility and d/c needs  Order placed for PT eval and tx, w/ up w/ A order  Performed at least 2 patient identifiers during session: Name and wristband  Comorbidities affecting pt's physical performance at time of assessment include: pulmonary HTN, HTN, reactive airway disease, CHF, morbid obesity, JANICE on CPAP  PTA, pt was independent w/ all functional mobility w/ rollator vs RW, ambulates household distances, lives w/ sister in 1 level home w/ ramped entrance, retired and reports at baseline she is independent w/ ADLs/IADLs, reports A'ing her sister w/ ADLs at baseline  Personal factors affecting pt at time of IE include: ambulating w/ assistive device, unable to perform dynamic tasks in community, limited home support, decreased initiation and engagement and limited insight into impairments   Please find objective findings from PT assessment regarding body systems outlined above with impairments and limitations including weakness, impaired balance, decreased endurance, decreased activity tolerance, fall risk and SOB upon exertion, as well as mobility assessment (need for supervision level of assist w/ all phases of mobility when usually ambulating independently)  The following objective measures performed on IE also reveal limitations: Barthel Index: 55/100 and Modified Yancy: 3 (moderate disability)  Pt to benefit from continued PT tx to address deficits as defined above and maximize level of functional independent mobility and consistency  From PT/mobility standpoint, recommendation at time of d/c would be Home PT pending progress in order to facilitate return to PLOF  Barriers to Discharge: Decreased caregiver support     Recommendation: Home PT     PT - OK to Discharge: Yes (when medically cleared)    See flowsheet documentation for full assessment

## 2018-08-15 NOTE — PROGRESS NOTES
[facility] Medicine Progress Note  Patient: Bozena Melgoza 65 y o  female   MRN: [id number]  PCP: MABEL Ko  Unit/Bed#: -01 Encounter: [id number]  Date Of Visit: 08/15/18    Assessment:    Principal Problem:    GALINDO (dyspnea on exertion)  Active Problems:    Hypothyroid    Hypertension    Hyperlipidemia    Aortic stenosis, moderate    Morbid obesity ([address] )    Pulmonary hypertension ([address] )    Acute on chronic diastolic heart failure ([address] )      Plan:  Hypoxia   Assessment & Plan     Consult Cardiology-likely due to severe AS-for LHC/RHC today possible need for AVR; pulmonary following as well as pt with JANICE and copd; echo with worsening AS  CTA chest without PE or chf  Breathing improved today on [id number]          Pulmonary hypertension ([facility] )   Assessment & Plan     Continue supportive care          Hypertension   Assessment & Plan     Continue losartan             Patient follows with ear nose and throat as an outpatient for left ear ?chronic otitis media she seen multiple ear nose and throat doctors and had multiple courses of antibiotics she is supposed to follow up with Ear Nose and Throat again as an outpatient but missed her appointment yesterday will reschedule        VTE Pharmacologic Prophylaxis:   Pharmacologic: Heparin  Mechanical VTE Prophylaxis in Place: Yes    Patient Centered Rounds: I have performed bedside rounds with nursing staff today  Discussions with Specialists or Other Care Team Provider:     Education and Discussions with Family / Patient:     Time Spent for Care: 30 minutes  More than 50% of total time spent on counseling and coordination of care as described above      Current Length of Stay: 2 day(s)    Current Patient Status: Inpatient   Certification Statement: The patient will continue to require additional inpatient hospital stay due to AS    Discharge Plan: pending need for AVR    Code Status: Level 1 - Full Code      Subjective:   Breathing improved since admission  She is able to ambulate to the bathroom without shortness of breath    Objective:     Vitals:   Temp (24hrs), Av 1 °F (36 7 °C), Min:97 6 °F (36 4 °C), Max:98 8 °F (37 1 °C)    HR:  [60-77] 60  Resp:  [17-24] 17  BP: (116-181)/(57-79) 136/65  SpO2:  [94 %-97 %] 94 %  Body mass index is 52 06 kg/m²  Input and Output Summary (last 24 hours): Intake/Output Summary (Last 24 hours) at 08/15/18 1025  Last data filed at 18 1700   Gross per 24 hour   Intake              340 ml   Output              700 ml   Net             -360 ml       Physical Exam:     Physical Exam   Constitutional: She appears well-developed and well-nourished  HENT:   Head: Normocephalic and atraumatic  Left Ear: External ear normal    Left tm with erythema   Neck: Neck supple  Cardiovascular: Normal rate and regular rhythm  Murmur heard  Pulmonary/Chest: Effort normal  No respiratory distress  She has no rales  Abdominal: Soft  Bowel sounds are normal    Musculoskeletal: She exhibits edema  Additional Data:     Labs:      Results from last 7 days  Lab Units 08/15/18  0442   WBC Thousand/uL 8 75   HEMOGLOBIN g/dL 9 8*   HEMATOCRIT % 33 2*   PLATELETS Thousands/uL 429*   NEUTROS PCT % 56   LYMPHS PCT % 24   MONOS PCT % 11   EOS PCT % 7*       Results from last 7 days  Lab Units 08/15/18  0442  18  1319   SODIUM mmol/L 142  < > 141   POTASSIUM mmol/L 4 3  < > 4 5   CHLORIDE mmol/L 105  < > 104   CO2 mmol/L 31  < > 29   BUN mg/dL 25  < > 22   CREATININE mg/dL 0 83  < > 0 75   CALCIUM mg/dL 9 6  < > 9 8   TOTAL PROTEIN g/dL  --   --  7 6   BILIRUBIN TOTAL mg/dL  --   --  0 20   ALK PHOS U/L  --   --  86   ALT U/L  --   --  18   AST U/L  --   --  18   GLUCOSE RANDOM mg/dL 110  < > 94   < > = values in this interval not displayed  * I Have Reviewed All Lab Data Listed Above  * Additional Pertinent Lab Tests Reviewed:  All Labs Within Last 24 Hours Reviewed    Imaging:    Imaging Reports Reviewed Today Include:   Imaging Personally Reviewed by Myself Includes:      Recent Cultures (last 7 days):           Last 24 Hours Medication List:     Current Facility-Administered Medications:  acetaminophen 650 mg Oral Q6H PRN Earna Manvel, PA-C   albuterol 2 5 mg Nebulization Q6H PRN Earna Manvel, PA-C   aspirin 81 mg Oral Daily Earna Manvel, PA-C   fluticasone 1 spray Nasal Daily Earna Manvel, PA-C   fluticasone-vilanterol 1 puff Inhalation Daily Earna Manvel, PA-C   furosemide 40 mg Intravenous Daily Ekta Grace MD   heparin (porcine) 5,000 Units Subcutaneous [facility] Earna Manvel, PA-C   levothyroxine 50 mcg Oral Early Morning Earna Manvel, PA-C   loratadine 10 mg Oral Daily Earna Manvel, PA-C   losartan 25 mg Oral Daily Earna Manvel, PA-C   ondansetron 4 mg Intravenous Q6H PRN Earna Manvel, PA-C   oxybutynin 5 mg Oral Daily Earna Manvel, PA-C   pantoprazole 20 mg Oral Early Morning Earna Manvel, PA-C   pravastatin 80 mg Oral Daily With Gurjit Lewis, PA-C   sertraline 50 mg Oral Daily Earna Manvel, PA-C   temazepam 15 mg Oral HS Earna Manvel, PA-C        Today, Patient Was Seen By: Saúl Degroot MD    ** Please Note: Dragon 360 Dictation voice to text software may have been used in the creation of this document   **

## 2018-08-15 NOTE — OCCUPATIONAL THERAPY NOTE
Occupational Therapy Evaluation        Patient Name: Nydia Renee  NSZBG'E Date: 8/15/2018     08/15/18 1134   Note Type   Note type Eval only   Restrictions/Precautions   Weight Bearing Precautions Per Order No   Braces or Orthoses Other (Comment)  (None at baseline)   Other Precautions Telemetry;O2;Fall Risk   Pain Assessment   Pain Assessment No/denies pain   Pain Score No Pain   Home Living   Type of [address] One level;Performs ADLs on one level; Able to live on main level with bedroom/bathroom; Ramped entrance   Bathroom Shower/Tub Tub/shower unit   Bathroom Toilet Raised   Bathroom Equipment Shower chair   Bathroom Accessibility Accessible;Accessible via walker   Home Equipment Walker;Cane;Life alert   Prior Function   Level of [place] Independent with ADLs and functional mobility   Lives With Family  (sister- pt reports taking care of sister)   Receives Help From Friend(s)  ('s wife A'ing pt's sister at home right now)   ADL Assistance Independent   IADLs Independent   Falls in the last 6 months 0   Vocational Retired   Lifestyle   Autonomy Patient reporting independent with ADLs/ IADLs, ambulatory with RW  Patient lives in a one story house with ramped entrance, patient is the main caregiver for her sister, also have a good support system from [employer]  Patient drives and completes IADLs independently     Reciprocal Relationships Supportive friends and [employer] members   Service to Others Retired   Psychosocial   Psychosocial ([address]) [address] [place]  6  Modified independent   Christopher Goncal[address] 20 [address] [place] [address] 5  Kandace [id number] 4  [facility]   Additional Comments Patient received OOB to Recliner Chair  OOB with RN assist of 1  Transfers   Sit to Stand 5  Supervision   Additional items Assist x 1; Increased time required;Verbal cues;Armrests   Stand to Sit 5  Supervision   Additional items Assist x 1; Armrests; Increased time required   Toilet transfer 5  Supervision   Additional items Assist x 1;Standard toilet; Increased time required;Verbal cues  (grab bar use)   Functional Mobility   Functional Mobility 5  Supervision   Additional items Rolling walker   Balance   Static Sitting Good   Dynamic Sitting Fair +   Static Standing Fair +   Dynamic Standing Fair   Activity Tolerance   Activity Tolerance Patient limited by fatigue;Patient tolerated treatment well   Nurse Made Aware RN made aware of therapy outcome  patient re positioned back to recliner, chair alarm engaged and working  RUE Assessment   RUE Assessment WFL   LUE Assessment   LUE Assessment WFL   Hand Function   Gross Motor Coordination Functional   Fine Motor Coordination Functional   Sensation   Light Touch No apparent deficits  (BUEs)   Proprioception   Proprioception No apparent deficits   Vision-Basic Assessment   Current Vision Wears glasses all the time   Patient Visual Report Other (Comment)  (No significant changes reported)   Perception   Inattention/Neglect Appears intact   Cognition   Overall Cognitive Status WFL   Arousal/Participation Alert; Responsive; Cooperative   Attention Within functional limits   Orientation Level Oriented X4   Memory Within functional limits   Following Commands Follows all commands and directions without difficulty   Assessment   Limitation Decreased ADL status; Decreased endurance;Decreased self-care trans;Decreased high-level ADLs   Prognosis Good   Assessment Patient is a 65 y o  female seen for OT evaluation s/p admit to [address] [facility] on 8/13/2018 w/GALINDO (dyspnea on exertion)  Patient presented to ED w/ SOB w/ pain typical exertional activity   Commorbidities affecting patient's functional performance at time of assessment include:pulmonary HTN, HTN, reactive airway disease, CHF, morbid obesity, JANICE on CPAP  Orders placed for OT evaluation and treatment  Performed at least two patient identifiers during session including name and wristband  Prior to admission, Patient reporting independent with ADLs/ IADLs, ambulatory with RW  Patient lives in a one story house with ramped entrance, patient is the main caregiver for her sister, also have a good support system [facility]  Patient drives and completes IADLs independently  Personal factors affecting patient at time of initial evaluation include: difficulty performing ADLs and difficulty performing IADLs  Upon evaluation, patient requires supervision and set up assist for UB ADLs, minimal  assist for LB ADLs, transfers and functional ambulation in room and bathroom with supervision and minimal  assist, with the use of Rolling Walker  Occupational performance is affected by the following deficits: dynamic sit/ stand balance deficit with poor standing tolerance time for self care and functional mobility, decreased activity tolerance and postural control and postural alignment deficit, requiring external assistance to complete transitional movements  Therapist completed expanded review of medical records and additional review of physical, cognitive or psychosocial history, clinical examination identifying 3-5 performance deficits, clinical decision making of a moderate complexity, consistent with moderate complexity level evaluation  Patient to benefit from continued Occupational Therapy treatment while in the hospital to address deficits as defined above and maximize level of functional independence with ADLs and functional mobility   Occupational Performance areas to address include: bathing/ shower, dressing, toilet hygiene, transfer to all surfaces, functional mobility, emergency response, health maintenance, medication routine/ management, IADLs: safety procedures, IADLs: meal prep/ clean up, Leisure Participation and Social participation  From OT standpoint, recommendation at time of d/c would be Home with daily checks, [address] and Home OT  Goals   Patient Goals " I want to return home"   Plan   Treatment Interventions ADL retraining; Endurance training;Patient/family training;Functional transfer training;Equipment evaluation/education; Compensatory technique education;Continued evaluation; Energy conservation; Activityengagement   OT Frequency 2-3x/wk   Recommendation   OT Discharge Recommendation Home OT   OT - OK to Discharge No  (Home OT/ Home with daily checks/ s/p cardiac cath)   Barthel Index   Feeding 10   Bathing 0   Grooming Score 5   Dressing Score 5   Bladder Score 10   Bowels Score 10   Toilet Use Score 5   Transfers (Bed/Chair) Score 10   Mobility (Level Surface) Score 0   Stairs Score 0   Barthel Index Score 55   Modified [place] Scale   Modified [place] Scale 3     Occupational therapy Goals: In 2-4 days    1- Patient will verbalize and demonstrate use of energy conservation/ deep breathing technique and work simplification skills during functional activity with no verbal cues  2- Patient will verbalize and demonstrate good body mechanics and joint protection techniques during ADLs/ IADLs with no verbal cues   3- Patient will increase OOB/ sitting tolerance to 4-6 hours per day for increased participation in self care and leisure tasks with no s/s of exertion  4- Patient will identify s/s of exertion during ADL and functional mobility with no verbal cues    5-Patient will verbalize/ demonstrate compensatory strategies to recover from exertion with no verbal cues  6-Patient will increase standing tolerance time to 10 minutes with No UE support to complete sink level ADLs @ Mod I level   7- Patient will increase sitting tolerance at edge of bed to 30 minutes to complete UB ADLs @ Indep  level     8-- Patient/ Family will demonstrate competency with UE Home Exercise Program

## 2018-08-15 NOTE — PHYSICAL THERAPY NOTE
Physical Therapy Evaluation     Patient's Name: Hernan Cartagena    Admitting Diagnosis  Morbid obesity ([facility] [address] ) [E66 01]  Dyspnea on exertion [R06 09]  Hypoxia [R09 02]  Abnormal laboratory test result [R89 9]    Problem List  Patient Active Problem List   Diagnosis    GERD (gastroesophageal reflux disease)    Hypothyroid    Hypertension    Community acquired pneumonia of left lower lobe of lung ([facility] )    Hyperlipidemia    Reactive airway disease without complication    JANICE (obstructive sleep apnea)    Aortic stenosis, moderate    Diastolic dysfunction    LVH (left ventricular hypertrophy)    Mitral annular calcification    Mitral valve stenosis    Morbid obesity ([facility] )    Nonrheumatic aortic valve insufficiency    Pulmonary hypertension ([facility] )    GALINDO (dyspnea on exertion)    Acute on chronic diastolic heart failure ([facility] )       Past Medical History  Past Medical History:   Diagnosis Date    Arthritis     Cardiac disease     "leaky valve"    Coronary artery disease     Disease of thyroid gland     Dislocation of right shoulder joint     Hypertension     Murmur, cardiac     Simple goiter     Skin cyst     within the armpits, right       Past Surgical History  Past Surgical History:   Procedure Laterality Date    BREAST BIOPSY      CARPAL TUNNEL RELEASE      CHOLECYSTECTOMY      DILATION AND CURETTAGE OF UTERUS      HYSTEROSCOPY      JOINT REPLACEMENT      b/l knee     JOINT REPLACEMENT  2007    left hip        08/15/18 0750   Note Type   Note type Eval only   Pain Assessment   Pain Assessment No/denies pain   Pain Score No Pain   Home Living   Type of [address] One level;Performs ADLs on one level; Able to live on main level with bedroom/bathroom; Ramped entrance   Bathroom Shower/Tub Tub/shower unit   Bathroom Toilet Raised   Bathroom Equipment Shower chair   Bathroom Accessibility Accessible;Accessible via walker   Home Equipment Other (Comment);Cane;Life alert  (pt uses rollator in/out of home)   Prior Function   Level of [place] Independent with ADLs and functional mobility   Lives With Family  (sister- pt reports taking care of sister)   Amanda Muir Help From Friend(s)  ('s wife A'ing pt's sister at home right now)   ADL Assistance Independent   IADLs Independent   Falls in the last 6 months 0   Comments pt drives   Restrictions/Precautions   Weight Bearing Precautions Per Order No   Other Precautions Telemetry;O2;Fall Risk   General   Family/Caregiver Present No   Cognition   Arousal/Participation Alert   Orientation Level Oriented X4   Memory Within functional limits   Following Commands Follows all commands and directions without difficulty   Comments pt agreeable to PT session   RUE Assessment   RUE Assessment WFL   LUE Assessment   LUE Assessment WFL   RLE Assessment   RLE Assessment WFL  (4/5)   LLE Assessment   LLE Assessment WFL  (4/5)   Coordination   Movements are Fluid and Coordinated 1   Sensation WFL  (pt denies numbness/tingling)   Light Touch   RLE Light Touch Grossly intact   LLE Light Touch Grossly intact   Bed Mobility   Rolling R Unable to assess  (pt received OOB in recliner)   Transfers   Sit to Stand 5  Supervision   Additional items Assist x 1; Increased time required;Verbal cues;Armrests   Stand to Sit 5  Supervision   Additional items Assist x 1; Armrests; Increased time required   Additional Comments static standing tolerance: > 30 seconds w/ RW UE support   Ambulation/Elevation   Gait pattern Shuffling; Short stride   Gait Assistance 5  Supervision   Additional items Assist x 1;Verbal cues; Tactile cues   Assistive Device Rolling walker   Distance 50'   Balance   Static Sitting Good   Dynamic Sitting Fair +   Static Standing Fair +   Dynamic Standing Fair   Ambulatory Fair   Endurance Deficit   Endurance Deficit Yes   Endurance Deficit Description (+) SOB w/ activity; SpO2 on 2 L O2 at rest = 93%, following amb trial on 2 L, SpO2 = 92%   Activity Tolerance   Activity Tolerance Patient limited by fatigue;Patient tolerated treatment well   Medical Staff Made Aware pt w up w/ A order   Nurse Made Aware Yes, RN Jennifer Chavez verbalized pt appropriate for PT session, made aware of outcomes   Assessment   Prognosis Good   Problem List Decreased strength;Decreased endurance; Impaired balance;Decreased mobility;Obesity   Assessment Pt is 65 y o  female seen for PT evaluation on 8/15/2018 s/p admit to [facility] on 8/13/2018 w/ GALINDO (dyspnea on exertion)- pt presented to ED w/ SOB w/ pain typical exertional activity  PT consulted to assess pt's functional mobility and d/c needs  Order placed for PT eval and tx, w/ up w/ A order  Performed at least 2 patient identifiers during session: Name and wristband  Comorbidities affecting pt's physical performance at time of assessment include: pulmonary HTN, HTN, reactive airway disease, CHF, morbid obesity, JANICE on CPAP  PTA, pt was independent w/ all functional mobility w/ rollator vs RW, ambulates household distances, lives w/ sister in 1 level home w/ ramped entrance, retired and reports at baseline she is independent w/ ADLs/IADLs, reports A'ing her sister w/ ADLs at baseline  Personal factors affecting pt at time of IE include: ambulating w/ assistive device, unable to perform dynamic tasks in community, limited home support, decreased initiation and engagement and limited insight into impairments  Please find objective findings from PT assessment regarding body systems outlined above with impairments and limitations including weakness, impaired balance, decreased endurance, decreased activity tolerance, fall risk and SOB upon exertion, as well as mobility assessment (need for supervision level of assist w/ all phases of mobility when usually ambulating independently)  The following objective measures performed on IE also reveal limitations: Barthel Index: 55/100 and Modified [place]: 3 (moderate disability)   Pt to benefit from continued PT tx to address deficits as defined above and maximize level of functional independent mobility and consistency  From PT/mobility standpoint, recommendation at time of d/c would be Home PT pending progress in order to facilitate return to PLOF  Barriers to Discharge Decreased caregiver support   Goals   Patient Goals "to return home"   [facility] Expiration Date 08/25/18   Short Term Goal #1 In 7-10 days: Increase bilateral LE strength 1/2 grade to facilitate independent mobility, Perform all bed mobility tasks modified independent to decrease caregiver burden, Perform all transfers modified independent to improve independence, Ambulate > 100 ft  with RW modified independent w/o LOB and w/ normalized gait pattern 100% of the time, Increase all balance 1/2 grade to decrease risk for falls, Complete exercise program independently and Tolerate 4 hr OOB to faciliate upright tolerance   Treatment Day 0   Plan   Treatment/Interventions Functional transfer training;LE strengthening/ROM; Therapeutic exercise; Endurance training;Patient/family training;Equipment eval/education; Bed mobility;Gait training;Spoke to nursing   PT Frequency 5x/wk   Recommendation   Recommendation Home PT   Equipment Recommended Walker   PT - OK to Discharge Yes  (when medically cleared)   Modified [place] Scale   Modified [place] Scale 3   Barthel Index   Feeding 10   Bathing 0   Grooming Score 5   Dressing Score 5   Bladder Score 10   Bowels Score 10   Toilet Use Score 5   Transfers (Bed/Chair) Score 10   Mobility (Level Surface) Score 0   Stairs Score 0   Barthel Index Score 55         Clemente Malloy, PT

## 2018-08-16 ENCOUNTER — APPOINTMENT (INPATIENT)
Dept: INTERVENTIONAL RADIOLOGY/VASCULAR | Facility: HOSPITAL | Age: 78
DRG: 286 | End: 2018-08-16
Payer: MEDICARE

## 2018-08-16 ENCOUNTER — TELEPHONE (OUTPATIENT)
Dept: CARDIOLOGY CLINIC | Facility: CLINIC | Age: 78
End: 2018-08-16

## 2018-08-16 PROBLEM — I50.33 ACUTE ON CHRONIC DIASTOLIC HEART FAILURE (HCC): Status: RESOLVED | Noted: 2018-08-14 | Resolved: 2018-08-16

## 2018-08-16 LAB
ATRIAL RATE: 70 BPM
P AXIS: 9 DEGREES
PR INTERVAL: 216 MS
QRS AXIS: -4 DEGREES
QRSD INTERVAL: 78 MS
QT INTERVAL: 394 MS
QTC INTERVAL: 425 MS
T WAVE AXIS: 56 DEGREES
VENTRICULAR RATE: 70 BPM

## 2018-08-16 PROCEDURE — 93460 R&L HRT ART/VENTRICLE ANGIO: CPT | Performed by: INTERNAL MEDICINE

## 2018-08-16 PROCEDURE — C1894 INTRO/SHEATH, NON-LASER: HCPCS | Performed by: PHYSICIAN ASSISTANT

## 2018-08-16 PROCEDURE — C1887 CATHETER, GUIDING: HCPCS | Performed by: PHYSICIAN ASSISTANT

## 2018-08-16 PROCEDURE — C1769 GUIDE WIRE: HCPCS | Performed by: PHYSICIAN ASSISTANT

## 2018-08-16 PROCEDURE — 99152 MOD SED SAME PHYS/QHP 5/>YRS: CPT | Performed by: PHYSICIAN ASSISTANT

## 2018-08-16 PROCEDURE — 4A023N8 MEASUREMENT OF CARDIAC SAMPLING AND PRESSURE, BILATERAL, PERCUTANEOUS APPROACH: ICD-10-PCS | Performed by: INTERNAL MEDICINE

## 2018-08-16 PROCEDURE — 93010 ELECTROCARDIOGRAM REPORT: CPT | Performed by: INTERNAL MEDICINE

## 2018-08-16 PROCEDURE — 82810 BLOOD GASES O2 SAT ONLY: CPT | Performed by: PHYSICIAN ASSISTANT

## 2018-08-16 PROCEDURE — 99152 MOD SED SAME PHYS/QHP 5/>YRS: CPT | Performed by: INTERNAL MEDICINE

## 2018-08-16 PROCEDURE — 94660 CPAP INITIATION&MGMT: CPT

## 2018-08-16 PROCEDURE — 99153 MOD SED SAME PHYS/QHP EA: CPT | Performed by: PHYSICIAN ASSISTANT

## 2018-08-16 PROCEDURE — G0278 ILIAC ART ANGIO,CARDIAC CATH: HCPCS | Performed by: PHYSICIAN ASSISTANT

## 2018-08-16 PROCEDURE — 93460 R&L HRT ART/VENTRICLE ANGIO: CPT | Performed by: PHYSICIAN ASSISTANT

## 2018-08-16 PROCEDURE — 99232 SBSQ HOSP IP/OBS MODERATE 35: CPT | Performed by: GENERAL PRACTICE

## 2018-08-16 PROCEDURE — C1760 CLOSURE DEV, VASC: HCPCS | Performed by: PHYSICIAN ASSISTANT

## 2018-08-16 PROCEDURE — 94760 N-INVAS EAR/PLS OXIMETRY 1: CPT

## 2018-08-16 PROCEDURE — B2101ZZ FLUOROSCOPY OF SINGLE CORONARY ARTERY USING LOW OSMOLAR CONTRAST: ICD-10-PCS | Performed by: INTERNAL MEDICINE

## 2018-08-16 PROCEDURE — 99232 SBSQ HOSP IP/OBS MODERATE 35: CPT | Performed by: INTERNAL MEDICINE

## 2018-08-16 RX ORDER — MIDAZOLAM HYDROCHLORIDE 1 MG/ML
INJECTION INTRAMUSCULAR; INTRAVENOUS CODE/TRAUMA/SEDATION MEDICATION
Status: COMPLETED | OUTPATIENT
Start: 2018-08-16 | End: 2018-08-16

## 2018-08-16 RX ORDER — FUROSEMIDE 40 MG/1
40 TABLET ORAL DAILY
Qty: 30 TABLET | Refills: 0 | Status: SHIPPED | OUTPATIENT
Start: 2018-08-16 | End: 2018-09-18 | Stop reason: SDUPTHER

## 2018-08-16 RX ORDER — LIDOCAINE HYDROCHLORIDE 10 MG/ML
INJECTION, SOLUTION INFILTRATION; PERINEURAL CODE/TRAUMA/SEDATION MEDICATION
Status: COMPLETED | OUTPATIENT
Start: 2018-08-16 | End: 2018-08-16

## 2018-08-16 RX ORDER — FENTANYL CITRATE 50 UG/ML
INJECTION, SOLUTION INTRAMUSCULAR; INTRAVENOUS CODE/TRAUMA/SEDATION MEDICATION
Status: COMPLETED | OUTPATIENT
Start: 2018-08-16 | End: 2018-08-16

## 2018-08-16 RX ADMIN — ACETAMINOPHEN 650 MG: 325 TABLET, FILM COATED ORAL at 21:05

## 2018-08-16 RX ADMIN — TEMAZEPAM 15 MG: 15 CAPSULE ORAL at 21:06

## 2018-08-16 RX ADMIN — PRAVASTATIN SODIUM 80 MG: 80 TABLET ORAL at 16:02

## 2018-08-16 RX ADMIN — LOSARTAN POTASSIUM 25 MG: 25 TABLET, FILM COATED ORAL at 11:14

## 2018-08-16 RX ADMIN — MIDAZOLAM HYDROCHLORIDE 1 MG: 1 INJECTION, SOLUTION INTRAMUSCULAR; INTRAVENOUS at 09:46

## 2018-08-16 RX ADMIN — LEVOTHYROXINE SODIUM 50 MCG: 50 TABLET ORAL at 06:33

## 2018-08-16 RX ADMIN — ACETAMINOPHEN 650 MG: 325 TABLET, FILM COATED ORAL at 13:20

## 2018-08-16 RX ADMIN — FUROSEMIDE 40 MG: 10 INJECTION, SOLUTION INTRAMUSCULAR; INTRAVENOUS at 11:38

## 2018-08-16 RX ADMIN — FENTANYL CITRATE 25 MCG: 50 INJECTION, SOLUTION INTRAMUSCULAR; INTRAVENOUS at 09:46

## 2018-08-16 RX ADMIN — HEPARIN SODIUM 5000 UNITS: 5000 INJECTION, SOLUTION INTRAVENOUS; SUBCUTANEOUS at 16:02

## 2018-08-16 RX ADMIN — PANTOPRAZOLE SODIUM 20 MG: 20 TABLET, DELAYED RELEASE ORAL at 06:33

## 2018-08-16 RX ADMIN — ASPIRIN 81 MG: 81 TABLET, COATED ORAL at 11:15

## 2018-08-16 RX ADMIN — OXYBUTYNIN CHLORIDE 5 MG: 5 TABLET, EXTENDED RELEASE ORAL at 11:14

## 2018-08-16 RX ADMIN — IOHEXOL 100 ML: 350 INJECTION, SOLUTION INTRAVENOUS at 10:31

## 2018-08-16 RX ADMIN — FLUTICASONE PROPIONATE 1 SPRAY: 50 SPRAY, METERED NASAL at 11:15

## 2018-08-16 RX ADMIN — HEPARIN SODIUM 5000 UNITS: 5000 INJECTION, SOLUTION INTRAVENOUS; SUBCUTANEOUS at 06:32

## 2018-08-16 RX ADMIN — LORATADINE 10 MG: 10 TABLET ORAL at 11:15

## 2018-08-16 RX ADMIN — SERTRALINE HYDROCHLORIDE 50 MG: 50 TABLET ORAL at 11:15

## 2018-08-16 RX ADMIN — LIDOCAINE HYDROCHLORIDE 10 ML: 10 INJECTION, SOLUTION INFILTRATION; PERINEURAL at 09:48

## 2018-08-16 NOTE — PLAN OF CARE
Problem: DISCHARGE PLANNING - CARE MANAGEMENT  Goal: Discharge to post-acute care or home with appropriate resources  INTERVENTIONS:  - Conduct assessment to determine patient/family and health care team treatment goals, and need for post-acute services based on payer coverage, community resources, and patient preferences, and barriers to discharge  - Address psychosocial, clinical, and financial barriers to discharge as identified in assessment in conjunction with the patient/family and health care team  - Arrange appropriate level of post-acute services according to patients   needs and preference and payer coverage in collaboration with the physician and health care team  - Communicate with and update the patient/family, physician, and health care team regarding progress on the discharge plan  - Arrange appropriate transportation to post-acute venues  Outcome: Progressing  Patient may need o2 upon discharge  Home with vna  Community members will transport home with stable

## 2018-08-16 NOTE — PROGRESS NOTES
[facility] Progress Note  Patient: Barry Mcduffie 65 y o  female   MRN: [id number]  PCP: [address] [place], [place] [address]MABEL  Unit/Bed#: MS [facility]-01 Encounter: [id number]  Date Of Visit: 08/16/18    Assessment:    Principal Problem:    GALINDO (dyspnea on exertion)  Active Problems:    Hypothyroid    Hypertension    Hyperlipidemia    Aortic stenosis, moderate    Morbid obesity ([address] )    Pulmonary hypertension ([facility] )    Acute on chronic diastolic heart failure ([facility] [address] )      Plan:    Hypoxia   Assessment & Plan     Consult Cardiology-likely due to severe AS-s/p LHC/RHC today needs  AVR d/w cardiology and can do as outpatient; pulmonary following as well as pt with JANICE and copd; echo with worsening AS  CTA chest without PE or chf  Breathing improved today on [id number]          Pulmonary hypertension ([facility] [address] )   Assessment & Plan     Continue supportive care          Hypertension   Assessment & Plan     Continue losartan             Patient follows with ear nose and throat as an outpatient for left ear ?chronic otitis media she seen multiple ear nose and throat doctors and had multiple courses of antibiotics she is supposed to follow up with Ear Nose and Throat again as an outpatient but missed her appointment yesterday will reschedule                 VTE Pharmacologic Prophylaxis:   Pharmacologic: Heparin  Mechanical VTE Prophylaxis in Place: Yes    Patient Centered Rounds: I have performed bedside rounds with nursing staff today  Discussions with Specialists or Other Care Team Provider:     Education and Discussions with Family / Patient:     Time Spent for Care: 30 minutes  More than 50% of total time spent on counseling and coordination of care as described above      Current Length of Stay: 3 day(s)    Current Patient Status: Inpatient   Certification Statement: The patient will continue to require additional inpatient hospital stay due to dc today    Discharge Plan: home    Code Status: Level 1 - Full Code      Subjective:   No c/o seen s/p cardiac cath aware she will need avr  Objective:     Vitals:   Temp (24hrs), Av 9 °F (36 6 °C), Min:97 4 °F (36 3 °C), Max:98 2 °F (36 8 °C)    HR:  [60-64] 60  Resp:  [17-18] 17  BP: (104-153)/(54-66) 153/66  SpO2:  [91 %-96 %] 96 %  Body mass index is 52 06 kg/m²  Input and Output Summary (last 24 hours): Intake/Output Summary (Last 24 hours) at 18 1150  Last data filed at 08/15/18 1725   Gross per 24 hour   Intake              240 ml   Output                0 ml   Net              240 ml       Physical Exam:     Physical Exam   Constitutional: She appears well-developed and well-nourished  HENT:   Head: Normocephalic and atraumatic  Eyes: Pupils are equal, round, and reactive to light  Cardiovascular: Normal rate and regular rhythm  Murmur heard  Pulmonary/Chest: Effort normal and breath sounds normal    Abdominal: Soft  Bowel sounds are normal    Musculoskeletal: She exhibits no edema  Additional Data:     Labs:      Results from last 7 days  Lab Units 08/15/18  0442   WBC Thousand/uL 8 75   HEMOGLOBIN g/dL 9 8*   HEMATOCRIT % 33 2*   PLATELETS Thousands/uL 429*   NEUTROS PCT % 56   LYMPHS PCT % 24   MONOS PCT % 11   EOS PCT % 7*       Results from last 7 days  Lab Units 08/15/18  0442  18  1319   SODIUM mmol/L 142  < > 141   POTASSIUM mmol/L 4 3  < > 4 5   CHLORIDE mmol/L 105  < > 104   CO2 mmol/L 31  < > 29   BUN mg/dL 25  < > 22   CREATININE mg/dL 0 83  < > 0 75   CALCIUM mg/dL 9 6  < > 9 8   TOTAL PROTEIN g/dL  --   --  7 6   BILIRUBIN TOTAL mg/dL  --   --  0 20   ALK PHOS U/L  --   --  86   ALT U/L  --   --  18   AST U/L  --   --  18   GLUCOSE RANDOM mg/dL 110  < > 94   < > = values in this interval not displayed  * I Have Reviewed All Lab Data Listed Above  * Additional Pertinent Lab Tests Reviewed:  All Labs Within Last 24 Hours Reviewed    Imaging:    Imaging Reports Reviewed Today Include:   Imaging Personally Reviewed by Myself Includes:      Recent Cultures (last 7 days):           Last 24 Hours Medication List:     Current Facility-Administered Medications:  acetaminophen 650 mg Oral Q6H PRN Mary Figueroa PA-C   albuterol 2 5 mg Nebulization Q6H PRN Mary Figueroa PA-C   aspirin 81 mg Oral Daily Mary Figueroa PA-C   fluticasone 1 spray Nasal Daily Mary Figueroa PA-C   furosemide 40 mg Intravenous Daily Morgan Mcneil MD   heparin (porcine) 5,000 Units Subcutaneous [facility] Mary Figueroa PA-C   levothyroxine 50 mcg Oral Early Morning Mary Figueroa PA-C   loratadine 10 mg Oral Daily Mary Figueroa PA-C   losartan 25 mg Oral Daily Mary Figueroa PA-C   ondansetron 4 mg Intravenous Q6H PRN Mary Figueroa PA-C   oxybutynin 5 mg Oral Daily Mary Figueroa PA-C   pantoprazole 20 mg Oral Early Morning Mary Figueroa PA-C   pravastatin 80 mg Oral Daily With [employer] , SHARI   sertraline 50 mg Oral Daily Mary Figueroa PA-C   temazepam 15 mg Oral HS Mary Figueroa PA-C        Today, Patient Was Seen By: Swetha Zimmerman MD    ** Please Note: Dragon 360 Dictation voice to text software may have been used in the creation of this document   **

## 2018-08-16 NOTE — DISCHARGE SUMMARY
Discharge Summary - [facility] Internal Medicine    Patient Information: Sarah Reyes 65 y o  female MRN: [id number]  Unit/Bed#: -01 Encounter: [id number]    Discharging Physician / Practitioner: Stephen Sinha MD  PCP: [facility] [place]  Admission Date: 8/13/2018  Discharge Date: 08/16/18    Reason for Admission:     [facility] Cardiology  Echo pending  Consult pulmonology  Patient requiring O2 via nasal cannula to maintain oxygen saturation          Pulmonary hypertension ([address] )   Assessment & Plan     Continue supportive care          Hypertension   Assessment & Plan         Discharge Diagnoses:     Principal Problem:    GALINDO (dyspnea on exertion)  Active Problems:    Hypothyroid    Hypertension    Hyperlipidemia    Aortic stenosis, moderate    Morbid obesity ([address] )    Pulmonary hypertension ([address] )  Resolved Problems:    Acute on chronic diastolic heart failure ([address] )      Consultations During Hospital Stay:  · Pulmonary, Cardiology    Procedures Performed:     · Left and right heart catheterization-  ·   CORONARY CIRCULATION:  Left main: Normal   LAD: The vessel was normal sized  Angiography showed minor luminal irregularities  Circumflex: The vessel was small sized  Angiography showed minor luminal irregularities  Ramus intermedius: The vessel was normal sized  Angiography showed minor luminal irregularities  RCA: The vessel was large sized (dominant)  Angiography showed minor luminal irregularities  And severe aortic stenosis    Significant Findings:     · See above    Incidental Findings:   ·  Pulmonary hypertension and diastolic congestive heart failure    Test Results Pending at Discharge (will require follow up):    Consultation for aortic valve replacement as outpatient     Outpatient Tests Requested:  ·     Complications:      Hospital Course:     Sarah Reyes is a 65 y o  female patient who originally presented to the hospital on 8/13/2018 due to shortness of breath            Hypoxia   Assessment & Plan     Consult Cardiology-likely due to severe AS-s/p LHC/RHC today needs  AVR d/w cardiology and can do as outpatient; pulmonary following as well as pt with JANICE and copd; echo with worsening AS-cardiac catheterization was severe aortic stenosis patient to have outpatient evaluation for aortic valve replacement  CTA chest without PE or chf  Breathing improved today on [id number] arrange for home          Pulmonary hypertension ([address] )   Assessment & Plan     Continue supportive care          Hypertension   Assessment & Plan     Continue losartan             Patient follows with ear nose and throat as an outpatient for left ear ?chronic otitis media she seen multiple ear nose and throat doctors and had multiple courses of antibiotics she is supposed to follow up with Ear Nose and Throat again as an outpatient but missed her appointment yesterday will reschedule       Condition at Discharge: stable     Discharge Day Visit / Exam:     * Please refer to separate progress for these details *    Discharge instructions/Information to patient and family:   See after visit summary for information provided to patient and family  Provisions for Follow-Up Care:  See after visit summary for information related to follow-up care and any pertinent home health orders  Disposition:     Home    For Discharges to [facility] SNF:   · Not Applicable to this Patient - Not Applicable to this Patient    Planned Readmission:     Discharge Statement:  I spent 40 minutes discharging the patient  This time was spent on the day of discharge  I had direct contact with the patient on the day of discharge  Greater than 50% of the total time was spent examining patient, answering all patient questions, arranging and discussing plan of care with patient as well as directly providing post-discharge instructions    Additional time then spent on discharge activities  Discharge Medications:  See after visit summary for reconciled discharge medications provided to patient and family  ** Please Note: Dragon 360 Dictation voice to text software may have been used in the creation of this document   **

## 2018-08-16 NOTE — PROGRESS NOTES
General Cardiology   Progress Note   Cat Posey 65 y o  female MRN: [id number]  Unit/Bed#: -01 Encounter: [id number]        Subjective:   S/p cardiac catheterization, showed severe AS with area 0 75 cm^2  Objective:   Vitals:  Vitals:    08/16/18 0700   BP: 128/62   Pulse: 62   Resp: 17   Temp: 98 2 °F (36 8 °C)   SpO2: 93%       Body mass index is 52 06 kg/m²  Systolic (82DUA), XUS:312 , Min:104 , UBP:741     Diastolic (06GTL), EMK:90, Min:54, Max:62      Intake/Output Summary (Last 24 hours) at 08/16/18 1045  Last data filed at 08/15/18 1725   Gross per 24 hour   Intake              240 ml   Output                0 ml   Net              240 ml     Weight (last 2 days)     Date/Time   Weight    08/14/18 1500  102 (224)                PHYSICAL EXAMS:  General:  Patient is not in acute distress, laying in the bed comfortably, awake, alert responding to commands  Head: Normocephalic, Atraumatic  HEENT: White sclera, pink conjunctiva,  PERRLA,pharynx benign  Neck:  Supple, no neck vein distention, carotids+2/+2 no bruits, thyromegaly, adenopathy  Respiratory: clear to P/A  Cardiovascular:  +3/6 murmur  PMI normal, S1-S2 normal, No  thrills, gallops, rubs   Regular rhythm  GI:  Abdomen soft nontender   No hepatosplenomegaly, adenopathy, ascites,or rebound tenderness  Extremities: No edema, normal pulses, no calf tenderness, no joint deformities, no venous disease   Integument:  No skin rashes or ulceration  Lymphatic:  No cervical or inguinal lymphadenopathy  Neurologic:  Patient is awake alert, responding to command, well-oriented to time and place and person moving all extremities      LABORATORY RESULTS:    Results from last 7 days  Lab Units 08/14/18  1216 08/14/18  0953 08/13/18  1946   TROPONIN I ng/mL <0 02 <0 02 <0 02     CBC with diff:   Results from last 7 days  Lab Units 08/15/18  0442 08/14/18  0600 08/13/18  1319   WBC Thousand/uL 8 75 8 77 9 20   HEMOGLOBIN g/dL 9 8* 9 6* 9 6*   HEMATOCRIT % 33 2* 32 6* 32 9*   MCV fL 79* 79* 79*   PLATELETS Thousands/uL 429* 416* 426*   MCH pg 23 3* 23 1* 22 9*   MCHC g/dL 29 5* 29 4* 29 2*   RDW % 19 0* 19 1* 18 8*   MPV fL 9 0 8 5* 8 8*   NRBC AUTO /100 WBCs 0 0 0       CMP:  Results from last 7 days  Lab Units 08/15/18  0442 18  0600 18  1319   SODIUM mmol/L 142 142 141   POTASSIUM mmol/L 4 3 4 1 4 5   CHLORIDE mmol/L 105 105 104   CO2 mmol/L 31 30 29   ANION GAP mmol/L 6 7 8   BUN mg/dL 25 20 22   CREATININE mg/dL 0 83 0 86 0 75   GLUCOSE RANDOM mg/dL 110 113 94   CALCIUM mg/dL 9 6 9 4 9 8   AST U/L  --   --  18   ALT U/L  --   --  18   ALK PHOS U/L  --   --  86   TOTAL PROTEIN g/dL  --   --  7 6   BILIRUBIN TOTAL mg/dL  --   --  0 20   EGFR ml/min/1 73sq m 68 65 77       BMP:  Results from last 7 days  Lab Units 08/15/18  0442 18  0600 18  1319   SODIUM mmol/L 142 142 141   POTASSIUM mmol/L 4 3 4 1 4 5   CHLORIDE mmol/L 105 105 104   CO2 mmol/L 31 30 29   BUN mg/dL 25 20 22   CREATININE mg/dL 0 83 0 86 0 75   GLUCOSE RANDOM mg/dL 110 113 94   CALCIUM mg/dL 9 6 9 4 9 8           Results from last 7 days  Lab Units 18  1946 18  1319   NT-PRO BNP pg/mL 207 105                        Lipid Profile:   No results found for: CHOL  Lab Results   Component Value Date    HDL 59 08/15/2018    HDL 65 (H) 2018     Lab Results   Component Value Date    LDLCALC 72 08/15/2018    LDLCALC 51 2018     Lab Results   Component Value Date    TRIG 112 08/15/2018    TRIG 83 2018       Cardiac testing:   Results for orders placed during the hospital encounter of 18   Echo complete with contrast if indicated    Narrative [address] [place] [address] [place], [place] [address] [phone]    Transthoracic Echocardiogram  2D, M-mode, Doppler, and Color Doppler    Study date:  14-Aug-2018    Patient: Bobetta Holter  MR number: [id number]  Account number: [de-identified]  : 1940  Age: 65 years  Gender: Female  Status: Inpatient  Location: Bedside  Height: 57 in  Weight: 224 lb  BP: 139/ 60 mmHg    Indications: Dyspnea, shortness of breath    Diagnoses: R06 00 - Dyspnea, unspecified    Sonographer:  Emilio Melissa [facility]  Interpreting Physician:  Vu Moseley MD  Referring Physician:  Candie Vieira PA-C  Group:  [facility]    SUMMARY    LEFT VENTRICLE:  Systolic function was normal  Ejection fraction was estimated in the range of 55 % to 65 %  There were no regional wall motion abnormalities  There was mild concentric hypertrophy  Doppler parameters were consistent with abnormal left ventricular relaxation (grade 1 diastolic dysfunction)  LEFT ATRIUM:  The atrium was mildly dilated  MITRAL VALVE:  There was mild annular calcification  AORTIC VALVE:  The valve was not visualized well enough to rule out a bicuspid morphology  Leaflets exhibited marked calcification and markedly reduced cuspal separation  Transaortic velocity was increased due to valvular stenosis  There was moderate to severe stenosis  There was mild regurgitation  TRICUSPID VALVE:  There was mild regurgitation  Estimated peak PA pressure was 40 mmHg  HISTORY: PRIOR HISTORY: Risk factors: CAD, hypertension, hypercholesterolemia, JANICE and morbid obesity  PROCEDURE: The procedure was performed at the bedside  This was a routine study  The transthoracic approach was used  The study included complete 2D imaging, M-mode, complete spectral Doppler, and color Doppler  The heart rate was 66 bpm,  at the start of the study  Images were obtained from the parasternal, apical, subcostal, and suprasternal notch acoustic windows  Echocardiographic views were limited due to decreased penetration and lung interference  Image quality was  adequate  LEFT VENTRICLE: Size was normal  Systolic function was normal  Ejection fraction was estimated in the range of 55 % to 65 %   There were no regional wall motion abnormalities  There was mild concentric hypertrophy  DOPPLER: Doppler  parameters were consistent with abnormal left ventricular relaxation (grade 1 diastolic dysfunction)  RIGHT VENTRICLE: The size was normal  Systolic function was normal  Wall thickness was normal     LEFT ATRIUM: The atrium was mildly dilated  RIGHT ATRIUM: Size was normal     MITRAL VALVE: There was mild annular calcification  Valve structure was normal  There was normal leaflet separation  DOPPLER: The transmitral velocity was within the normal range  There was no evidence for stenosis  There was no  regurgitation  AORTIC VALVE: The valve was not visualized well enough to rule out a bicuspid morphology  Leaflets exhibited marked calcification and markedly reduced cuspal separation  DOPPLER: Transaortic velocity was increased due to valvular stenosis  There was moderate to severe stenosis  There was mild regurgitation  TRICUSPID VALVE: The valve structure was normal  There was normal leaflet separation  DOPPLER: The transtricuspid velocity was within the normal range  There was no evidence for stenosis  There was mild regurgitation  Estimated peak PA  pressure was 40 mmHg  PULMONIC VALVE: Leaflets exhibited normal thickness, no calcification, and normal cuspal separation  DOPPLER: The transpulmonic velocity was within the normal range  There was no regurgitation  PERICARDIUM: There was no pericardial effusion  The pericardium was normal in appearance  AORTA: The root exhibited normal size  SYSTEMIC VEINS: IVC: The inferior vena cava was normal in size and course   Respirophasic changes were normal     SYSTEM MEASUREMENT TABLES    2D  %FS: 36 9 %  Ao Diam: 3 2 cm  EDV(Teich): 89 2 ml  EF(Teich): 67 %  ESV(Teich): 29 5 ml  IVSd: 1 2 cm  LA Area: 25 9 cm2  LA Diam: 4 1 cm  LVEDV MOD A4C: 83 7 ml  LVEF MOD A4C: 83 2 %  LVESV MOD A4C: 14 ml  LVIDd: 4 4 cm  LVIDs: 2 8 cm  LVLd A4C: 8 2 cm  LVLs A4C: 5 9 cm  LVOT Diam: 2 1 cm  LVPWd: 1 2 cm  RA Area: 18 4 cm2  RVIDd: 4 4 cm  SV MOD A4C: 69 6 ml  SV(Teich): 59 7 ml    CW  AR Dec [place]: 2 4 m/s2  AR Dec Time: 1508 3 ms  AR PHT: 437 4 ms  AR Vmax: 3 6 m/s  AR maxP 4 mmHg  AV Env  Ti: 296 9 ms  AV VTI: 90 6 cm  AV Vmax: 4 1 m/s  AV Vmean: 3 1 m/s  AV maxP 1 mmHg  AV meanP 1 mmHg  TR Vmax: 3 3 m/s  TR maxP 3 mmHg    MM  TAPSE: 2 4 cm    PW  VASU (VTI): 1 3 cm2  VASU Vmax: 1 cm2  E': 0 1 m/s  E/E': 18  LVOT Env  Ti: 388 2 ms  LVOT VTI: 34 7 cm  LVOT Vmax: 1 3 m/s  LVOT Vmean: 0 9 m/s  LVOT maxP 6 mmHg  LVOT meanPG: 3 8 mmHg  LVSV Dopp: 114 6 ml  MV A Jose: 1 4 m/s  MV Dec [place]: 1 8 m/s2  MV DecT: 541 5 ms  MV E Jose: 1 m/s  MV E/A Ratio: 0 7  MV PHT: 157 ms  MVA By PHT: 1 4 cm2    [employer] Accredited Echocardiography Laboratory    Prepared and electronically signed by    Orquidea Patterson MD  Signed 14-Aug-2018 18:35:14           Meds/Allergies   all current active meds have been reviewed and current meds:   Current Facility-Administered Medications   Medication Dose Route Frequency    acetaminophen (TYLENOL) tablet 650 mg  650 mg Oral Q6H PRN    albuterol inhalation solution 2 5 mg  2 5 mg Nebulization Q6H PRN    aspirin (ECOTRIN LOW STRENGTH) EC tablet 81 mg  81 mg Oral Daily    fluticasone (FLONASE) 50 mcg/act nasal spray 1 spray  1 spray Nasal Daily    furosemide (LASIX) injection 40 mg  40 mg Intravenous Daily    heparin (porcine) subcutaneous injection 5,000 Units  5,000 Units Subcutaneous Q8H [address]    levothyroxine tablet 50 mcg  50 mcg Oral Early Morning    loratadine (CLARITIN) tablet 10 mg  10 mg Oral Daily    losartan (COZAAR) tablet 25 mg  25 mg Oral Daily    ondansetron (ZOFRAN) injection 4 mg  4 mg Intravenous Q6H PRN    oxybutynin (DITROPAN-XL) 24 hr tablet 5 mg  5 mg Oral Daily    pantoprazole (PROTONIX) EC tablet 20 mg  20 mg Oral Early Morning    pravastatin (PRAVACHOL) tablet 80 mg  80 mg Oral Daily With BMdr sertraline (ZOLOFT) tablet 50 mg  50 mg Oral Daily    temazepam (RESTORIL) capsule 15 mg  15 mg Oral HS     Prescriptions Prior to Admission   Medication    aspirin (ECOTRIN LOW STRENGTH) 81 mg EC tablet    b complex vitamins capsule    calcium carbonate (OS-SYLVESTER) 600 MG tablet    Fesoterodine Fumarate ER (TOVIAZ) 8 MG TB24    fluticasone (FLONASE) 50 mcg/act nasal spray    fluticasone-vilanterol (BREO ELLIPTA) 100-25 mcg/inh inhaler    levocetirizine (XYZAL) 5 MG tablet    levothyroxine 50 mcg tablet    loratadine (CLARITIN) 10 mg tablet    losartan (COZAAR) 50 mg tablet    omeprazole (PriLOSEC) 40 MG capsule    sertraline (ZOLOFT) 50 mg tablet    simvastatin (ZOCOR) 40 mg tablet    temazepam (RESTORIL) 15 mg capsule    albuterol (2 5 mg/3 mL) 0 083 % nebulizer solution            Assessment/Plan:  1   SOB:  -likely attributed to severe aortic stenosis  -, although likely inaccurate given morbid obesity  -serial troponins negative x3  -CXR negative for acute consolidation or congestion, CTA chest negative for pulmonary embolism  -Stress test November 2017 unremarkable for ischemia  -ECHO this admission shows EF 55-65%, no regional wall motion abnormalities, grade 1 diastolic dysfunction, moderate to severe AS, estimated peak PA pressure 40 mmHg  -Cardiac cath showed severe AS  -Continue diuresis      2   Severe aortic stenosis:  Severe per cardiac catheterization, valve area 0 75 cm^2  Will ask patient to follow up outpatient, and subsequently refer to CT surgery for further evaluation at that time      3   AJNICE:  On CPAP     4   Obesity:  Weight loss counseling      5   Hyperlipidemia:  Continue statin      6   Hypertension:  Stable, continue present regimen  Stable for discharge from cardiac standpoint   Will follow up with Dr Carmen Marino as scheduled, will need CT surgery evaluation for severe AS     ** Please Note: Dragon 360 Dictation voice to text software may have been used in the creation of this document   **

## 2018-08-16 NOTE — PLAN OF CARE
CARDIOVASCULAR - ADULT     Absence of cardiac dysrhythmias or at baseline rhythm Progressing        DISCHARGE PLANNING     Discharge to home or other facility with appropriate resources Progressing        INFECTION - ADULT     Absence or prevention of progression during hospitalization Progressing        Knowledge Deficit     Patient/family/caregiver demonstrates understanding of disease process, treatment plan, medications, and discharge instructions Progressing        Nutrition/Hydration-ADULT     Nutrient/Hydration intake appropriate for improving, restoring or maintaining nutritional needs Progressing        PAIN - ADULT     Verbalizes/displays adequate comfort level or baseline comfort level Progressing        Potential for Falls     Patient will remain free of falls Progressing        Prexisting or High Potential for Compromised Skin Integrity     Skin integrity is maintained or improved Progressing        RESPIRATORY - ADULT     Achieves optimal ventilation and oxygenation Progressing        SAFETY ADULT     Patient will remain free of falls Progressing

## 2018-08-16 NOTE — PHYSICAL THERAPY NOTE
Physical Therapy Cancellation Note    Chart review performed  At this time, PT treatment session cancelled as patient is currently on post procedural bedrest 2/2 cardiac cath procedure until 17:00 per pt's RN  PT will follow and provide PT interventions as appropriate      Arminda Morgan PT

## 2018-08-16 NOTE — SOCIAL WORK
Cm met with patient at bedside, patient reports residing in a one level home with her sister with ramp access  Patient reports being the primary caretaker for her sister  Patient reports being completely independent with ADL's, uses a cane to ambulate, drives and has a hx of vna  Patient reports being agreeable to vna upon discharge no preference  Patient reports filling her prescriptions at [address], with no co-payment barriers  Patient reports going to her PCP as recommended and has an appointment on Monday  Patient reports having a life alert and has community supports  Patient reports seeing Dr Franck Cormier for pulmonary follow up and reports having a Cpap machine  Patient reports her POA is Kimo burton who is her pastors wife  Referrals sent for vna this day  CM reviewed discharge planning process including the following: identifying help at home, patient preference for discharge planning needs, pharmacy preference, and availability of treatment team to discuss questions or concerns patient and/or family may have regarding understanding medications and recognizing signs and symptoms once discharged  CM also encouraged patient to follow up with all recommended appointments after discharge  Patient advised of importance for patient and family to participate in managing patients medical well being  CM name and role reviewed  Discharge Checklist reviewed and CM will continue to monitor for progress toward discharge goals in nursing and provider rounds

## 2018-08-17 VITALS
SYSTOLIC BLOOD PRESSURE: 121 MMHG | HEIGHT: 55 IN | OXYGEN SATURATION: 93 % | RESPIRATION RATE: 17 BRPM | TEMPERATURE: 98 F | BODY MASS INDEX: 51.84 KG/M2 | HEART RATE: 62 BPM | WEIGHT: 224 LBS | DIASTOLIC BLOOD PRESSURE: 57 MMHG

## 2018-08-17 PROCEDURE — 94761 N-INVAS EAR/PLS OXIMETRY MLT: CPT

## 2018-08-17 PROCEDURE — 99232 SBSQ HOSP IP/OBS MODERATE 35: CPT | Performed by: INTERNAL MEDICINE

## 2018-08-17 PROCEDURE — 94660 CPAP INITIATION&MGMT: CPT

## 2018-08-17 PROCEDURE — 99239 HOSP IP/OBS DSCHRG MGMT >30: CPT | Performed by: GENERAL PRACTICE

## 2018-08-17 PROCEDURE — 94760 N-INVAS EAR/PLS OXIMETRY 1: CPT

## 2018-08-17 RX ORDER — FUROSEMIDE 40 MG/1
40 TABLET ORAL DAILY
Status: DISCONTINUED | OUTPATIENT
Start: 2018-08-17 | End: 2018-08-17 | Stop reason: HOSPADM

## 2018-08-17 RX ADMIN — LEVOTHYROXINE SODIUM 50 MCG: 50 TABLET ORAL at 06:16

## 2018-08-17 RX ADMIN — ASPIRIN 81 MG: 81 TABLET, COATED ORAL at 08:42

## 2018-08-17 RX ADMIN — HEPARIN SODIUM 5000 UNITS: 5000 INJECTION, SOLUTION INTRAVENOUS; SUBCUTANEOUS at 06:16

## 2018-08-17 RX ADMIN — LORATADINE 10 MG: 10 TABLET ORAL at 08:42

## 2018-08-17 RX ADMIN — SERTRALINE HYDROCHLORIDE 50 MG: 50 TABLET ORAL at 08:42

## 2018-08-17 RX ADMIN — FUROSEMIDE 40 MG: 10 INJECTION, SOLUTION INTRAMUSCULAR; INTRAVENOUS at 08:42

## 2018-08-17 RX ADMIN — FLUTICASONE PROPIONATE 1 SPRAY: 50 SPRAY, METERED NASAL at 08:42

## 2018-08-17 RX ADMIN — OXYBUTYNIN CHLORIDE 5 MG: 5 TABLET, EXTENDED RELEASE ORAL at 08:42

## 2018-08-17 RX ADMIN — LOSARTAN POTASSIUM 25 MG: 25 TABLET, FILM COATED ORAL at 08:42

## 2018-08-17 RX ADMIN — PANTOPRAZOLE SODIUM 20 MG: 20 TABLET, DELAYED RELEASE ORAL at 06:16

## 2018-08-17 NOTE — SOCIAL WORK
Patient received a cardiac cath yesterday afternoon, she was not able to complete her walking pulse ox until after 5pm on 08/16/18  RT contacted this am and will complete home o2 evaluation

## 2018-08-17 NOTE — PLAN OF CARE
CARDIOVASCULAR - ADULT     Absence of cardiac dysrhythmias or at baseline rhythm Progressing        DISCHARGE PLANNING     Discharge to home or other facility with appropriate resources Progressing        DISCHARGE PLANNING - CARE MANAGEMENT     Discharge to post-acute care or home with appropriate resources Progressing        INFECTION - ADULT     Absence or prevention of progression during hospitalization Progressing        Knowledge Deficit     Patient/family/caregiver demonstrates understanding of disease process, treatment plan, medications, and discharge instructions Progressing        Nutrition/Hydration-ADULT     Nutrient/Hydration intake appropriate for improving, restoring or maintaining nutritional needs Progressing        PAIN - ADULT     Verbalizes/displays adequate comfort level or baseline comfort level Progressing        Potential for Falls     Patient will remain free of falls Progressing        Prexisting or High Potential for Compromised Skin Integrity     Skin integrity is maintained or improved Progressing        RESPIRATORY - ADULT     Achieves optimal ventilation and oxygenation Progressing        SAFETY ADULT     Patient will remain free of falls Progressing

## 2018-08-17 NOTE — PROGRESS NOTES
General Cardiology   Progress Note   Melvin Ann 65 y o  female MRN: [id number]  Unit/Bed#: -01 Encounter: [id number]        Subjective:   Still has SOB  [facility] [facility] Mary Gillespie [facility] [address] [facility] for TAVR evaluation  Objective:   Vitals:  Vitals:    08/17/18 0724   BP: 121/57   Pulse: 62   Resp: 17   Temp: 98 °F (36 7 °C)   SpO2: 93%       Body mass index is 52 06 kg/m²  Systolic (99RNQ), TZR:660 , Min:116 , UXP:850     Diastolic (11SVB), FNM:77, Min:53, Max:67      Intake/Output Summary (Last 24 hours) at 08/17/18 1022  Last data filed at 08/17/18 0801   Gross per 24 hour   Intake               10 ml   Output              200 ml   Net             -190 ml     Weight (last 2 days)     None        PHYSICAL EXAMS:  General:  +obese  Patient is not in acute distress, laying in the bed comfortably, awake, alert responding to commands  Head: Normocephalic, Atraumatic  HEENT: White sclera, pink conjunctiva,  PERRLA,pharynx benign  Neck:  Supple, no neck vein distention, carotids+2/+2 no bruits, thyromegaly, adenopathy  Respiratory: clear to P/A  Cardiovascular:  +3/6 murmur  PMI normal, S1-S2 normal, No thrills, gallops, rubs   Regular rhythm  GI:  Abdomen soft nontender   No hepatosplenomegaly, adenopathy, ascites,or rebound tenderness  Extremities: No edema, normal pulses, no calf tenderness, no joint deformities, no venous disease   Integument:  No skin rashes or ulceration  Lymphatic:  No cervical or inguinal lymphadenopathy  Neurologic:  Patient is awake alert, responding to command, well-oriented to time and place and person moving all extremities       LABORATORY RESULTS:    Results from last 7 days  Lab Units 08/14/18  1216 08/14/18  0953 08/13/18  1946   TROPONIN I ng/mL <0 02 <0 02 <0 02     CBC with diff:   Results from last 7 days  Lab Units 08/15/18  0442 08/14/18  0600 08/13/18  1319   WBC Thousand/uL 8 75 8 77 9 20   HEMOGLOBIN g/dL 9 8* 9 6* 9 6*   HEMATOCRIT % 33 2* 32 6* 32 9*   MCV fL 79* 79* 79*   PLATELETS Thousands/uL 429* 416* 426*   MCH pg 23 3* 23 1* 22 9*   MCHC g/dL 29 5* 29 4* 29 2*   RDW % 19 0* 19 1* 18 8*   MPV fL 9 0 8 5* 8 8*   NRBC AUTO /100 WBCs 0 0 0       CMP:  Results from last 7 days  Lab Units 08/15/18  0442 18  0600 18  1319   SODIUM mmol/L 142 142 141   POTASSIUM mmol/L 4 3 4 1 4 5   CHLORIDE mmol/L 105 105 104   CO2 mmol/L 31 30 29   ANION GAP mmol/L 6 7 8   BUN mg/dL 25 20 22   CREATININE mg/dL 0 83 0 86 0 75   GLUCOSE RANDOM mg/dL 110 113 94   CALCIUM mg/dL 9 6 9 4 9 8   AST U/L  --   --  18   ALT U/L  --   --  18   ALK PHOS U/L  --   --  86   TOTAL PROTEIN g/dL  --   --  7 6   BILIRUBIN TOTAL mg/dL  --   --  0 20   EGFR ml/min/1 73sq m 68 65 77       BMP:  Results from last 7 days  Lab Units 08/15/18  0442 18  0600 18  1319   SODIUM mmol/L 142 142 141   POTASSIUM mmol/L 4 3 4 1 4 5   CHLORIDE mmol/L 105 105 104   CO2 mmol/L 31 30 29   BUN mg/dL 25 20 22   CREATININE mg/dL 0 83 0 86 0 75   GLUCOSE RANDOM mg/dL 110 113 94   CALCIUM mg/dL 9 6 9 4 9 8           Results from last 7 days  Lab Units 18  1946 18  1319   NT-PRO BNP pg/mL 207 105                        Lipid Profile:   No results found for: CHOL  Lab Results   Component Value Date    HDL 59 08/15/2018    HDL 65 (H) 2018     Lab Results   Component Value Date    LDLCALC 72 08/15/2018    LDLCALC 51 2018     Lab Results   Component Value Date    TRIG 112 08/15/2018    TRIG 83 2018       Cardiac testing:   Results for orders placed during the hospital encounter of 18   Echo complete with contrast if indicated    Narrative [address] [place] [address] A Richard Watson 89  [phone]    Transthoracic Echocardiogram  2D, M-mode, Doppler, and Color Doppler    Study date:  14-Aug-2018    Patient: Yue Camp  MR number: [id number]  Account number: [de-identified]  : 1940  Age: 65 years  Gender: Female  Status: Inpatient  Location: Bedside  Height: 57 in  Weight: 224 lb  BP: 139/ 60 mmHg    Indications: Dyspnea, shortness of breath    Diagnoses: R06 00 - Dyspnea, unspecified    Sonographer:  Lazaro Pinon RDCS  Interpreting Physician:  Kalen March MD  Referring Physician:  Dinora Cevallos PA-C  Group:  [facility]    SUMMARY    LEFT VENTRICLE:  Systolic function was normal  Ejection fraction was estimated in the range of 55 % to 65 %  There were no regional wall motion abnormalities  There was mild concentric hypertrophy  Doppler parameters were consistent with abnormal left ventricular relaxation (grade 1 diastolic dysfunction)  LEFT ATRIUM:  The atrium was mildly dilated  MITRAL VALVE:  There was mild annular calcification  AORTIC VALVE:  The valve was not visualized well enough to rule out a bicuspid morphology  Leaflets exhibited marked calcification and markedly reduced cuspal separation  Transaortic velocity was increased due to valvular stenosis  There was moderate to severe stenosis  There was mild regurgitation  TRICUSPID VALVE:  There was mild regurgitation  Estimated peak PA pressure was 40 mmHg  HISTORY: PRIOR HISTORY: Risk factors: CAD, hypertension, hypercholesterolemia, JANICE and morbid obesity  PROCEDURE: The procedure was performed at the bedside  This was a routine study  The transthoracic approach was used  The study included complete 2D imaging, M-mode, complete spectral Doppler, and color Doppler  The heart rate was 66 bpm,  at the start of the study  Images were obtained from the parasternal, apical, subcostal, and suprasternal notch acoustic windows  Echocardiographic views were limited due to decreased penetration and lung interference  Image quality was  adequate  LEFT VENTRICLE: Size was normal  Systolic function was normal  Ejection fraction was estimated in the range of 55 % to 65 %  There were no regional wall motion abnormalities   There was mild concentric hypertrophy  DOPPLER: Doppler  parameters were consistent with abnormal left ventricular relaxation (grade 1 diastolic dysfunction)  RIGHT VENTRICLE: The size was normal  Systolic function was normal  Wall thickness was normal     LEFT ATRIUM: The atrium was mildly dilated  RIGHT ATRIUM: Size was normal     MITRAL VALVE: There was mild annular calcification  Valve structure was normal  There was normal leaflet separation  DOPPLER: The transmitral velocity was within the normal range  There was no evidence for stenosis  There was no  regurgitation  AORTIC VALVE: The valve was not visualized well enough to rule out a bicuspid morphology  Leaflets exhibited marked calcification and markedly reduced cuspal separation  DOPPLER: Transaortic velocity was increased due to valvular stenosis  There was moderate to severe stenosis  There was mild regurgitation  TRICUSPID VALVE: The valve structure was normal  There was normal leaflet separation  DOPPLER: The transtricuspid velocity was within the normal range  There was no evidence for stenosis  There was mild regurgitation  Estimated peak PA  pressure was 40 mmHg  PULMONIC VALVE: Leaflets exhibited normal thickness, no calcification, and normal cuspal separation  DOPPLER: The transpulmonic velocity was within the normal range  There was no regurgitation  PERICARDIUM: There was no pericardial effusion  The pericardium was normal in appearance  AORTA: The root exhibited normal size  SYSTEMIC VEINS: IVC: The inferior vena cava was normal in size and course   Respirophasic changes were normal     SYSTEM MEASUREMENT TABLES    2D  %FS: 36 9 %  Ao Diam: 3 2 cm  EDV(Teich): 89 2 ml  EF(Teich): 67 %  ESV(Teich): 29 5 ml  IVSd: 1 2 cm  LA Area: 25 9 cm2  LA Diam: 4 1 cm  LVEDV MOD A4C: 83 7 ml  LVEF MOD A4C: 83 2 %  LVESV MOD A4C: 14 ml  LVIDd: 4 4 cm  LVIDs: 2 8 cm  LVLd A4C: 8 2 cm  LVLs A4C: 5 9 cm  LVOT Diam: 2 1 cm  LVPWd: 1 2 cm  RA Area: 18 4 cm2  RVIDd: 4 4 cm  SV MOD A4C: 69 6 ml  SV(Teich): 59 7 ml    CW  AR Dec [place]: 2 4 m/s2  AR Dec Time: 1508 3 ms  AR PHT: 437 4 ms  AR Vmax: 3 6 m/s  AR maxP 4 mmHg  AV Env  Ti: 296 9 ms  AV VTI: 90 6 cm  AV Vmax: 4 1 m/s  AV Vmean: 3 1 m/s  AV maxP 1 mmHg  AV meanP 1 mmHg  TR Vmax: 3 3 m/s  TR maxP 3 mmHg    MM  TAPSE: 2 4 cm    PW  VASU (VTI): 1 3 cm2  VASU Vmax: 1 cm2  E': 0 1 m/s  E/E': 18  LVOT Env  Ti: 388 2 ms  LVOT VTI: 34 7 cm  LVOT Vmax: 1 3 m/s  LVOT Vmean: 0 9 m/s  LVOT maxP 6 mmHg  LVOT meanPG: 3 8 mmHg  LVSV Dopp: 114 6 ml  MV A Jose: 1 4 m/s  MV Dec [place]: 1 8 m/s2  MV DecT: 541 5 ms  MV E Jose: 1 m/s  MV E/A Ratio: 0 7  MV PHT: 157 ms  MVA By PHT: 1 4 cm2    [facility] Accredited Echocardiography [facility]    Prepared and electronically signed by    Orquidea Patterson MD  Signed 14-Aug-2018 18:35:14           Meds/Allergies   all current active meds have been reviewed and current meds:   Current Facility-Administered Medications   Medication Dose Route Frequency    acetaminophen (TYLENOL) tablet 650 mg  650 mg Oral Q6H PRN    albuterol inhalation solution 2 5 mg  2 5 mg Nebulization Q6H PRN    aspirin (ECOTRIN LOW STRENGTH) EC tablet 81 mg  81 mg Oral Daily    fluticasone (FLONASE) 50 mcg/act nasal spray 1 spray  1 spray Nasal Daily    furosemide (LASIX) tablet 40 mg  40 mg Oral Daily    heparin (porcine) subcutaneous injection 5,000 Units  5,000 Units Subcutaneous Q8H [address]    levothyroxine tablet 50 mcg  50 mcg Oral Early Morning    loratadine (CLARITIN) tablet 10 mg  10 mg Oral Daily    losartan (COZAAR) tablet 25 mg  25 mg Oral Daily    ondansetron (ZOFRAN) injection 4 mg  4 mg Intravenous Q6H PRN    oxybutynin (DITROPAN-XL) 24 hr tablet 5 mg  5 mg Oral Daily    pantoprazole (PROTONIX) EC tablet 20 mg  20 mg Oral Early Morning    pravastatin (PRAVACHOL) tablet 80 mg  80 mg Oral Daily With Dinner    sertraline (ZOLOFT) tablet 50 mg  50 mg Oral Daily    temazepam (RESTORIL) capsule 15 mg  15 mg Oral HS     Prescriptions Prior to Admission   Medication    aspirin (ECOTRIN LOW STRENGTH) 81 mg EC tablet    b complex vitamins capsule    calcium carbonate (OS-SYLVESTER) 600 MG tablet    Fesoterodine Fumarate ER (TOVIAZ) 8 MG TB24    fluticasone (FLONASE) 50 mcg/act nasal spray    fluticasone-vilanterol (BREO ELLIPTA) 100-25 mcg/inh inhaler    levocetirizine (XYZAL) 5 MG tablet    levothyroxine 50 mcg tablet    loratadine (CLARITIN) 10 mg tablet    losartan (COZAAR) 50 mg tablet    omeprazole (PriLOSEC) 40 MG capsule    sertraline (ZOLOFT) 50 mg tablet    simvastatin (ZOCOR) 40 mg tablet    temazepam (RESTORIL) 15 mg capsule    albuterol (2 5 mg/3 mL) 0 083 % nebulizer solution            Assessment/Plan:  1   SOB:  -likely attributed to severe aortic stenosis  -, although likely inaccurate given morbid obesity  -serial troponins negative x3  -CXR negative for acute consolidation or congestion, CTA chest negative for pulmonary embolism  -Stress test November 2017 unremarkable for ischemia  -ECHO this admission shows EF 55-65%, no regional wall motion abnormalities, grade 1 diastolic dysfunction, moderate to severe AS, estimated peak PA pressure 40 mmHg  -Cardiac cath showed severe AS  -Continue PO      2   Severe aortic stenosis:  Severe per cardiac catheterization, valve area 0 75 cm^2  Discussed with Dr Vidal Upton, CT surgery, for TAVR evaluation      3   JANICE:  On CPAP     4   Obesity:  Weight loss counseling      5   Hyperlipidemia:  Continue statin      6   Hypertension:  Stable, continue present regimen      Stable for discharge from cardiac standpoint  Will follow up with Dr Gigi Villa as scheduled  ** Please Note: Dragon 360 Dictation voice to text software may have been used in the creation of this document   **

## 2018-08-17 NOTE — PLAN OF CARE
Problem: DISCHARGE PLANNING - CARE MANAGEMENT  Goal: Discharge to post-acute care or home with appropriate resources  INTERVENTIONS:  - Conduct assessment to determine patient/family and health care team treatment goals, and need for post-acute services based on payer coverage, community resources, and patient preferences, and barriers to discharge  - Address psychosocial, clinical, and financial barriers to discharge as identified in assessment in conjunction with the patient/family and health care team  - Arrange appropriate level of post-acute services according to patient's   needs and preference and payer coverage in collaboration with the physician and health care team  - Communicate with and update the patient/family, physician, and health care team regarding progress on the discharge plan  - Arrange appropriate transportation to post-acute venues   Outcome: Adequate for Discharge  Patient will discharge home with Jenn dubosea nd o2 provided by [employer]

## 2018-08-17 NOTE — RESPIRATORY THERAPY NOTE
Home Oxygen Qualifying Test       Patient name: Aimee Gutiérrez        : 1940   Date of Test:  2018  Diagnosis:      Home Oxygen Test:    **Medicare Guidelines require item(s) 1-5 on all ambulatory patients or 1 and 2 on on-ambulatory patients  1   Baseline SPO2 on Room Air at rest 96 %  2   SPO2 during exercise on Room Air 86 %  During exercise monitor SpO2  If SPO2 increases >=88% with ambulation do not add supplemental             oxygen  If < 88% on room air add O2 via NC and titrate patient  Patient must be ambulated with O2 and titrated to > 88% with exertion  3   SPO2 on Oxygen at rest 94 % 1 lpm     4   SPO2 during exercise on Oxygen  92% a liter flow of 1 lpm     5   Exercise performed:          walking          [x]  Supplemental Home Oxygen is indicated  []  Client does not qualify for home oxygen  Respiratory Additional Notes- Performed home o2 evaluation on patient  She tolerated walk with minimal respiratory distress  Spo2 dropped to 86% during walk on room air  When [facility] was placed on patient Spo2 nicole as high as 92% although remained at 91% for most of the remainder of the walk  When patient arrived back in the room and was at rest spo2 was 94%       Logan Johnson, RT

## 2018-08-17 NOTE — DISCHARGE SUMMARY
Reason for Admission:          Hypoxia   [facility] Cardiology  Echo pending  Consult pulmonology  Patient requiring O2 via nasal cannula to maintain oxygen saturation          Pulmonary hypertension ([address] )   Assessment & Plan     Continue supportive care          Hypertension   Assessment & Plan            Discharge Diagnoses:      Principal Problem:    GALINDO (dyspnea on exertion)  Active Problems:    Hypothyroid    Hypertension    Hyperlipidemia    Aortic stenosis, moderate    Morbid obesity ([address] )    Pulmonary hypertension ([address] )  Resolved Problems:    Acute on chronic diastolic heart failure ([facility] [address] )        Consultations During Hospital Stay:  · Pulmonary, Cardiology     Procedures Performed:      · Left and right heart catheterization-  ·    CORONARY CIRCULATION:  Left main: Normal   LAD: The vessel was normal sized  Angiography showed minor luminal irregularities  Circumflex: The vessel was small sized  Angiography showed minor luminal irregularities  Ramus intermedius: The vessel was normal sized  Angiography showed minor luminal irregularities  RCA: The vessel was large sized (dominant)  Angiography showed minor luminal irregularities  And severe aortic stenosis     Significant Findings:      · See above     Incidental Findings:   ·  Pulmonary hypertension and diastolic congestive heart failure     Test Results Pending at Discharge (will require follow up):    Consultation for aortic valve replacement as outpatient     Outpatient Tests Requested:  ·       Complications:       Hospital Course:      Lexie Lundberg is a 65 y o  female patient who originally presented to the hospital on 8/13/2018 due to shortness of breath               Hypoxia   Assessment & Plan     Consult Cardiology-likely due to severe AS-s/p LHC/RHC today needs  AVR d/w cardiology and can do as outpatient; pulmonary following as well as pt with JANICE and copd; echo with worsening AS-cardiac catheterization was severe aortic stenosis patient to have outpatient evaluation for aortic valve replacement  CTA chest without PE or chf; follow up dr Emanuel Downey at [address] improved today on [id number] arrange for home          Pulmonary hypertension ([address] )   Assessment & Plan     Continue supportive care          Hypertension   Assessment & Plan     Continue losartan             Patient follows with ear nose and throat as an outpatient for left ear ?chronic otitis media she seen multiple ear nose and throat doctors and had multiple courses of antibiotics she is supposed to follow up with Ear Nose and Throat again as an outpatient but missed her appointment yesterday will reschedule        PE    Vitals reviewed    Cor reg with systolic murmur  Pul cta   abd soft ext trace edema      Condition at Discharge: stable      Discharge Day Visit / Exam:      * Please refer to separate progress for these details *     Discharge instructions/Information to patient and family:   See after visit summary for information provided to patient and family        Provisions for Follow-Up Care:  See after visit summary for information related to follow-up care and any pertinent home health orders        Disposition:      Home     For Discharges to [facility]:   · Not Applicable to this Patient - Not Applicable to this Patient     Planned Readmission:      Discharge Statement:  I spent 40 minutes discharging the patient  This time was spent on the day of discharge  I had direct contact with the patient on the day of discharge  Greater than 50% of the total time was spent examining patient, answering all patient questions, arranging and discussing plan of care with patient as well as directly providing post-discharge instructions    Additional time then spent on discharge activities      Discharge Medications:  See after visit summary for reconciled discharge medications provided to patient and family        ** Please Note: Dragon 360 Dictation voice to text software may have been used in the creation of this document   **

## 2018-08-17 NOTE — SOCIAL WORK
Cm met patient at bedside, patient alert and oriented during interview  Patient aware of her discharge this day and reports remaining agreeable to [facility]  Patient provided cm with consent to make her follow up appointments  Appointments made avs updated  Cm reviewed patient imm letter, patient reports understanding signed a copy and kept a copy for his records

## 2018-08-20 ENCOUNTER — LAB (OUTPATIENT)
Dept: LAB | Facility: HOSPITAL | Age: 78
End: 2018-08-20
Payer: MEDICARE

## 2018-08-20 ENCOUNTER — OFFICE VISIT (OUTPATIENT)
Dept: FAMILY MEDICINE CLINIC | Facility: CLINIC | Age: 78
End: 2018-08-20
Payer: MEDICARE

## 2018-08-20 ENCOUNTER — TRANSITIONAL CARE MANAGEMENT (OUTPATIENT)
Dept: FAMILY MEDICINE CLINIC | Facility: CLINIC | Age: 78
End: 2018-08-20

## 2018-08-20 ENCOUNTER — OFFICE VISIT (OUTPATIENT)
Dept: CARDIOLOGY CLINIC | Facility: CLINIC | Age: 78
End: 2018-08-20
Payer: MEDICARE

## 2018-08-20 VITALS
HEIGHT: 55 IN | DIASTOLIC BLOOD PRESSURE: 80 MMHG | SYSTOLIC BLOOD PRESSURE: 144 MMHG | HEART RATE: 80 BPM | OXYGEN SATURATION: 96 % | BODY MASS INDEX: 50.91 KG/M2 | WEIGHT: 220 LBS

## 2018-08-20 VITALS
OXYGEN SATURATION: 94 % | DIASTOLIC BLOOD PRESSURE: 72 MMHG | WEIGHT: 220 LBS | HEIGHT: 55 IN | HEART RATE: 85 BPM | BODY MASS INDEX: 50.91 KG/M2 | SYSTOLIC BLOOD PRESSURE: 128 MMHG

## 2018-08-20 DIAGNOSIS — R35.0 URINARY FREQUENCY: ICD-10-CM

## 2018-08-20 DIAGNOSIS — R19.5 HEME POSITIVE STOOL: ICD-10-CM

## 2018-08-20 DIAGNOSIS — D64.9 ANEMIA, UNSPECIFIED TYPE: ICD-10-CM

## 2018-08-20 DIAGNOSIS — G47.33 OSA (OBSTRUCTIVE SLEEP APNEA): Primary | ICD-10-CM

## 2018-08-20 DIAGNOSIS — E66.01 MORBID OBESITY (HCC): ICD-10-CM

## 2018-08-20 DIAGNOSIS — I27.20 PULMONARY HYPERTENSION (HCC): Chronic | ICD-10-CM

## 2018-08-20 DIAGNOSIS — I35.0 SEVERE AORTIC STENOSIS: ICD-10-CM

## 2018-08-20 DIAGNOSIS — I51.7 LVH (LEFT VENTRICULAR HYPERTROPHY): ICD-10-CM

## 2018-08-20 DIAGNOSIS — I05.9 MITRAL ANNULAR CALCIFICATION: ICD-10-CM

## 2018-08-20 DIAGNOSIS — I50.32 CHRONIC DIASTOLIC (CONGESTIVE) HEART FAILURE (HCC): ICD-10-CM

## 2018-08-20 DIAGNOSIS — E78.2 MIXED HYPERLIPIDEMIA: ICD-10-CM

## 2018-08-20 DIAGNOSIS — H74.92 DISORDER OF LEFT MASTOID: ICD-10-CM

## 2018-08-20 DIAGNOSIS — I34.2 NON-RHEUMATIC MITRAL VALVE STENOSIS: ICD-10-CM

## 2018-08-20 DIAGNOSIS — G47.33 OSA (OBSTRUCTIVE SLEEP APNEA): ICD-10-CM

## 2018-08-20 DIAGNOSIS — R06.02 SHORTNESS OF BREATH: ICD-10-CM

## 2018-08-20 DIAGNOSIS — I27.20 PULMONARY HYPERTENSION (HCC): ICD-10-CM

## 2018-08-20 DIAGNOSIS — R06.02 SHORTNESS OF BREATH: Primary | ICD-10-CM

## 2018-08-20 DIAGNOSIS — I35.1 NONRHEUMATIC AORTIC VALVE INSUFFICIENCY: ICD-10-CM

## 2018-08-20 DIAGNOSIS — I10 ESSENTIAL HYPERTENSION: ICD-10-CM

## 2018-08-20 LAB
BACTERIA UR QL AUTO: ABNORMAL /HPF
BILIRUB UR QL STRIP: NEGATIVE
CLARITY UR: ABNORMAL
COLOR UR: YELLOW
FERRITIN SERPL-MCNC: 20 NG/ML (ref 8–388)
GLUCOSE UR STRIP-MCNC: NEGATIVE MG/DL
HGB UR QL STRIP.AUTO: NEGATIVE
IRON SATN MFR SERPL: 7 %
IRON SERPL-MCNC: 27 UG/DL (ref 50–170)
KETONES UR STRIP-MCNC: NEGATIVE MG/DL
LEUKOCYTE ESTERASE UR QL STRIP: ABNORMAL
NITRITE UR QL STRIP: NEGATIVE
NON-SQ EPI CELLS URNS QL MICRO: ABNORMAL /HPF
PH UR STRIP.AUTO: 5.5 [PH] (ref 4.5–8)
PROT UR STRIP-MCNC: NEGATIVE MG/DL
RBC #/AREA URNS AUTO: ABNORMAL /HPF
SP GR UR STRIP.AUTO: 1.02 (ref 1–1.03)
TIBC SERPL-MCNC: 387 UG/DL (ref 250–450)
UROBILINOGEN UR QL STRIP.AUTO: 0.2 E.U./DL
WBC #/AREA URNS AUTO: ABNORMAL /HPF

## 2018-08-20 PROCEDURE — 87186 SC STD MICRODIL/AGAR DIL: CPT

## 2018-08-20 PROCEDURE — 81001 URINALYSIS AUTO W/SCOPE: CPT

## 2018-08-20 PROCEDURE — 99495 TRANSJ CARE MGMT MOD F2F 14D: CPT | Performed by: FAMILY MEDICINE

## 2018-08-20 PROCEDURE — 36415 COLL VENOUS BLD VENIPUNCTURE: CPT

## 2018-08-20 PROCEDURE — 87086 URINE CULTURE/COLONY COUNT: CPT

## 2018-08-20 PROCEDURE — 87077 CULTURE AEROBIC IDENTIFY: CPT

## 2018-08-20 PROCEDURE — 83540 ASSAY OF IRON: CPT

## 2018-08-20 PROCEDURE — 83550 IRON BINDING TEST: CPT

## 2018-08-20 PROCEDURE — 99215 OFFICE O/P EST HI 40 MIN: CPT | Performed by: INTERNAL MEDICINE

## 2018-08-20 PROCEDURE — 82728 ASSAY OF FERRITIN: CPT

## 2018-08-20 NOTE — PROGRESS NOTES
Assessment/Plan:     Chronic Problems:  Shortness of breath  Keep the f/u appt with Dr Spence Runner  Will need to reschedule the appt with Dr Yas Marshall  Severe aortic stenosis  Keep the appt with the thoracic surgeon  Chronic diastolic (congestive) heart failure (HCC)  Continue on your current meds  Visit Diagnosis:  Diagnoses and all orders for this visit:    JANICE (obstructive sleep apnea)  Comments:  Continue to use her CPAP with the oxygen as this is helping your head issues and breathing  Pulmonary hypertension (HCC)  Comments:    Keep the follow-up with Dr Spence Runner  Continue current medications  Chronic diastolic (congestive) heart failure (HCC)  Comments:    Continue all your current medication    Severe aortic stenosis    Shortness of breath    Urinary frequency  Comments: Will get ua and c&s now  Orders:  -     Urinalysis with microscopic; Future  -     Urine culture; Future    Disorder of left mastoid  Comments:   advised to cancel the coming up appointment with Dr Yas Marshall as you need to see Dr Spence Runner  Need to reschedule the appointment with Dr Yas Marshall    Anemia, unspecified type  Comments: Will get studies and change referral from  Dr Yoseph Zamora to Dr Anthony Rothman as per family request for heme positive stool  Orders:  -     Iron; Future  -     Iron Saturation %; Future  -     Ferritin; Future  -     TIBC; Future  -     Ambulatory referral to Gastroenterology; Future    Heme positive stool  Comments: Will refer to Dr Anthony Rothman  Orders:  -     Iron; Future  -     Iron Saturation %; Future  -     Ferritin; Future  -     TIBC; Future  -     Ambulatory referral to Gastroenterology; Future        Subjective:     Patient ID: Teresa Thomas is a 65 y o  female  Pt is here for hospital f/u  Was admitted to [facility] and found to have severe aortic stenosis and diastolic dysfunction  Currently on 2 liters of oxygen   Breathing ok when she is at rest  Also seen by Dr Belinda Vo this am and due to have surgery from [place]  Has f/u with thoracic surgeon Dr Ruiz Monday  Pt also c/o urinary frequency today with no burning wonders if she has a uti  H&P and d/c summary reviewed  Takes all other meds as directed  No side effects noted           Active Problems    Patient Active Problem List   Diagnosis    GERD (gastroesophageal reflux disease)    Hypothyroid    Hypertension    Community acquired pneumonia of left lower lobe of lung ([facility] [address] )    Hyperlipidemia    Reactive airway disease without complication    JANICE (obstructive sleep apnea)    Severe aortic stenosis    Diastolic dysfunction    LVH (left ventricular hypertrophy)    Mitral annular calcification    Mitral valve stenosis    Morbid obesity ([facility] [address] )    Pulmonary hypertension (HCC)    Shortness of breath    Chronic diastolic (congestive) heart failure (HCC)       Past Medical History     Past Medical History:   Diagnosis Date    Arthritis     Cardiac disease     "leaky valve"    Coronary artery disease     Disease of thyroid gland     Dislocation of right shoulder joint     Hypertension     Murmur, cardiac     Simple goiter     Skin cyst     within the armpits, right       Surgical History    Past Surgical History:   Procedure Laterality Date    BREAST BIOPSY      CARPAL TUNNEL RELEASE      CHOLECYSTECTOMY      DILATION AND CURETTAGE OF UTERUS      HYSTEROSCOPY      JOINT REPLACEMENT      b/l knee     JOINT REPLACEMENT  2007    left hip       Current Meds       Current Outpatient Prescriptions:     albuterol (2 5 mg/3 mL) 0 083 % nebulizer solution, Take 3 mL by nebulization every 6 (six) hours as needed for wheezing or shortness of breath, Disp: 75 mL, Rfl: 0    aspirin (ECOTRIN LOW STRENGTH) 81 mg EC tablet, Take 1 tablet by mouth daily, Disp: 30 tablet, Rfl: 0    b complex vitamins capsule, Take 1 capsule by mouth 2 (two) times a day  , Disp: , Rfl:     calcium carbonate (OS-SYLVESTER) 600 MG tablet, Take 1 tablet by mouth 2 (two) times a day  , Disp: , Rfl:     Fesoterodine Fumarate ER (TOVIAZ) 8 MG TB24, Take 8 mg by mouth daily, Disp: , Rfl:     fluticasone (FLONASE) 50 mcg/act nasal spray, 1 spray into each nostril daily, Disp: , Rfl:     fluticasone-vilanterol (BREO ELLIPTA) 100-25 mcg/inh inhaler, Inhale 1 puff daily, Disp: 1 Inhaler, Rfl: 0    furosemide (LASIX) 40 mg tablet, Take 1 tablet (40 mg total) by mouth daily, Disp: 30 tablet, Rfl: 0    levothyroxine 50 mcg tablet, Take 50 mcg by mouth daily, Disp: , Rfl:     loratadine (CLARITIN) 10 mg tablet, Take 10 mg by mouth daily, Disp: , Rfl:     losartan (COZAAR) 50 mg tablet, Take 0 5 tablets (25 mg total) by mouth daily, Disp: 30 tablet, Rfl: 3    omeprazole (PriLOSEC) 40 MG capsule, Take 40 mg by mouth 2 (two) times a day, Disp: , Rfl: 1    sertraline (ZOLOFT) 50 mg tablet, Take 50 mg by mouth daily, Disp: , Rfl:     simvastatin (ZOCOR) 40 mg tablet, Take 40 mg by mouth daily at bedtime, Disp: , Rfl:     temazepam (RESTORIL) 15 mg capsule, Take 15 mg by mouth daily, Disp: , Rfl: 1    Allergies    Allergies   Allergen Reactions    Latex Rash    Neosporin [Neomycin-Bacitracin Zn-Polymyx] Rash and Other (See Comments)     hives per [facility] order       No images are attached to the encounter      Health Management    Health Maintenance   Topic Date Due    GLAUCOMA SCREENING 65 + YR  12/09/2006    INFLUENZA VACCINE  09/01/2018    Pneumococcal PPSV23/PCV13 65+ Years / Low and Medium Risk (2 of 2 - PPSV23) 06/19/2019    Fall Risk  07/03/2019    Depression Screening PHQ-9  07/03/2019    Urinary Incontinence Screening  07/03/2019    DTaP,Tdap,and Td Vaccines (2 - Td) 07/03/2028       CBC:     Results from last 6 Months  Lab Units 08/15/18  0442   WBC Thousand/uL 8 75   RBC Million/uL 4 21   HEMOGLOBIN g/dL 9 8*   HEMATOCRIT % 33 2*   MCV fL 79*   MCH pg 23 3*   MCHC g/dL 29 5*   RDW % 19 0*   MPV fL 9 0   PLATELETS Thousands/uL 429*   NRBC AUTO /100 WBCs 0   NEUTROS PCT % 56 LYMPHS PCT % 24   MONOS PCT % 11   EOS PCT % 7*   BASOS PCT % 1   NEUTROS ABS Thousands/µL 4 88   LYMPHS ABS Thousands/µL 2 13   MONOS ABS Thousand/µL 0 99   EOS ABS Thousand/µL 0 64*     Chemistry Profile:   Results from last 6 Months  Lab Units 08/15/18  0442  08/13/18  1319 06/05/18  0736   SODIUM mmol/L 142  < > 141 141   POTASSIUM mmol/L 4 3  < > 4 5 4 2   CHLORIDE mmol/L 105  < > 104 102   CO2 mmol/L 31  < > 29 31   ANION GAP mmol/L 6  < > 8 8   BUN mg/dL 25  < > 22 19   CREATININE mg/dL 0 83  < > 0 75 0 74   GLUCOSE FASTING mg/dL  --   --   --  92   GLUCOSE RANDOM mg/dL 110  < > 94  --    CALCIUM mg/dL 9 6  < > 9 8 9 9   MAGNESIUM mg/dL  --   --   --  2 0   AST U/L  --   --  18 20   ALT U/L  --   --  18 22   ALK PHOS U/L  --   --  86 81   TOTAL PROTEIN g/dL  --   --  7 6 7 8   BILIRUBIN TOTAL mg/dL  --   --  0 20 0 40   EGFR ml/min/1 73sq m 68  < > 77 78   < > = values in this interval not displayed  Coagulation Studies:     Endocrine Studies:     Results from last 6 Months  Lab Units 06/05/18  0736   TSH 3RD GENERATON uIU/mL 1 575       Imaging: Xr Chest Pa & Lateral    Result Date: 8/13/2018  Narrative: CHEST INDICATION:   R06 02: Shortness of breath I35 0: Nonrheumatic aortic (valve) stenosis  COMPARISON:  December 3, 2017 EXAM PERFORMED/VIEWS:  XR CHEST PA & LATERAL FINDINGS: Cardiomediastinal silhouette appears unremarkable  The lungs are clear  No pneumothorax or pleural effusion  Postsurgical changes from prior rotator cuff repair     Impression: No acute consolidation congestion Hypoinflated lungs Workstation performed: [id number]     Cta Ed Chest Pe Study    Result Date: 8/13/2018  Narrative: CTA - CHEST WITH IV CONTRAST - PULMONARY ANGIOGRAM INDICATION:   Shortness of breath  COMPARISON: CT of the chest October 29, 2017  TECHNIQUE: CTA examination of the chest was performed using angiographic technique according to a protocol specifically tailored to evaluate for pulmonary embolism    Axial, sagittal, and coronal 2D reformatted images were created from the source data and  submitted for interpretation  In addition, coronal 3D MIP postprocessing was performed on the acquisition scanner  Radiation dose length product (DLP) for this visit:  541 mGy-cm   This examination, like all CT scans performed in the [facility], was performed utilizing techniques to minimize radiation dose exposure, including the use of iterative reconstruction and automated exposure control  IV Contrast:  100 mL of iohexol (OMNIPAQUE)  was administered intravenously without immediate adverse reaction  FINDINGS: PULMONARY ARTERIAL TREE:  No pulmonary embolus is seen  LUNGS:  Mild hypoventilatory changes within the dependent lungs  No focal consolidation  The central airways are patent  PLEURA:  No pneumothorax or pleural effusion  HEART/AORTA:  Mitral and aortic calcifications  Coronary artery calcifications  The aorta is normal in course and caliber  No pericardial effusion  MEDIASTINUM AND SCOTTY:  Mediastinal lymph nodes measure up to 1 cm in short axis similar to prior examination  CHEST WALL AND LOWER NECK:   Unremarkable  VISUALIZED STRUCTURES IN THE UPPER ABDOMEN:  Unremarkable  OSSEOUS STRUCTURES:  No acute fracture or destructive osseous lesion  Degenerative changes of the osseous structures  Severe joint space narrowing at the glenohumeral joints  Suture anchors are seen within the right glenoid and right humeral head  Impression: No evidence of pulmonary embolism  Workstation performed: [id number]         Review of Systems   Constitutional: Positive for fatigue  Negative for chills, diaphoresis and fever  HENT: Negative for ear pain (missed her appt with Dr Timothy Pollock  Just does not feel right in the head  ), sinus pressure, sneezing and sore throat  Eyes: Negative  Respiratory: Positive for cough (has f/u with Dr Cleone Boeck  ) and shortness of breath  Negative for wheezing           When on cpap in the hospital, had no issues with her head being foggy  Cardiovascular: Negative for chest pain, palpitations and leg swelling  Gastrointestinal: Negative for abdominal pain, constipation, diarrhea and nausea  Genitourinary: Positive for frequency  Negative for dysuria and urgency  Musculoskeletal: Positive for gait problem  Ambulates with a walker   Neurological: Negative for dizziness, light-headedness and headaches  Just does not feel right in the head  Psychiatric/Behavioral: Negative for dysphoric mood  The patient is not nervous/anxious  Objective:     Physical Exam   Constitutional: She is oriented to person, place, and time  She appears well-developed and well-nourished  No distress  HENT:   Head: Normocephalic and atraumatic  Right Ear: External ear normal    Mouth/Throat: Oropharynx is clear and moist    Left ear actually looks better than previous exam     Eyes: Conjunctivae and EOM are normal  Pupils are equal, round, and reactive to light  Right eye exhibits no discharge  Left eye exhibits no discharge  Neck: Normal range of motion  Neck supple  No thyromegaly present  Cardiovascular: Normal rate and regular rhythm  Exam reveals no friction rub  Murmur (Grade 3/6 systolic murmur  ) heard  Pulmonary/Chest: Effort normal and breath sounds normal  No respiratory distress (but sob with exercise  )  She has no wheezes  She has no rales  She exhibits no tenderness  Currently on 2 liters n/c  Abdominal: Soft  Bowel sounds are normal  There is no tenderness  Musculoskeletal: Normal range of motion  She exhibits no edema, tenderness or deformity  Lymphadenopathy:     She has no cervical adenopathy  Neurological: She is alert and oriented to person, place, and time  No cranial nerve deficit  Skin: Skin is warm and dry  No rash noted  She is not diaphoretic  Psychiatric: She has a normal mood and affect   Her behavior is normal  Judgment and thought content normal          /72   Pulse 85   Ht 4' 7" (1 397 m)   Wt 99 8 kg (220 lb)   SpO2 94%   BMI 51 13 kg/m²     Vitals:    08/20/18 1421   BP: 128/72   Pulse: 85   SpO2: 94%   Weight: 99 8 kg (220 lb)   Height: 4' 7" (1 397 m)       Transitional Care Management Review:  Cat Posey is a 65 y o  female here for TCM follow up       During the TCM phone call patient stated:    Date and time hospital follow up call was made:  8/20/2018  9:14 AM  Patient was hopsital[facility] at:  [facility]  Date of admission:  8/13/18  Date of discharge:  8/17/18  Diagnosis:  GALINDO (dyspnea on exertion)   Disposition:  Home  Were the patients medicaitons reviewed and updated:  Yes  Current symptoms:  Shortness of breath  Post hospital issues:  None  Should patient be enrolled in anticoag monitoring?:  No  Scheduled for follow up?:  Yes  Patients specialists:  Cardiologist, Pulmonlolgist  Did you obtain your prescribed medications:  Yes  Do you need help managing your perscriptions or medications:  No  Is transportation to your appointments needed:  Yes  Specify why:  Pt is brought to her appt by Tyron Henao   Living Arrangements:  Family members  Support System:  Family, [place]  Are you recieving outpatient services:  No  Are you recieving home care services:  No  Are you using any community resources:  No  Current waiver service:  No  Have you fallen in the last 12 months:  No  Interperter language line required?:  No  Counseling:  Patient                     [address] [place] [address]

## 2018-08-20 NOTE — PATIENT INSTRUCTIONS
[facility] records reviewed with patient  Agree with the follow-up with thoracic surgeon for repair of the aortic valve  Continue to keep your follow-up with Dr Conrad Pihpps and Dr Ollie Stephen  You need to reschedule your appointment with Dr Aisha Mondragon, however the ear looks better  Continue the oxygen use  Will get ua and c&S now however I suspect the urinary frequency is from the water pill  Pt would prefer to see Dr Janet Obregon for her heme positive stool and anemia  Have the additional labs done and I will call with results  Will try to get patient in with Dr Janet Obregon for evaluation as soon as possible

## 2018-08-20 NOTE — PROGRESS NOTES
CARDIOLOGY OFFICE VISIT  [facility]  [address], [facility] [place]  Tel: [phone]      NAME: Eric Ornelas  AGE: 65 y o  SEX: female  : 1940   MRN: [id number]      Chief Complaint:  Chief Complaint   Patient presents with    Follow-up     hospital SL  EKG/ECHO/CATH         History of Present Illness:   Patient comes for follow up after recent (Aug 2018) hospitalization at [facility] for SOB  Was diagnosed with acute diastolic CHF and severe aortic stenosis per cardiac catheterization  States she continues to be SOB with activity  Denies palpitations, lightheadedness, syncope, swelling feet, orthopnea, PND, claudication      Aortic stenosis severe - per cvardiac cath  Being referred to CT surgery for AVR     Hypertension, LVH, DD -  Has had it for many years  Takes her medications regularly  Denies lightheadedness, headache, medication side effects        Hyperlipidemia -  Has had it for few years  Takes her medications regularly  Denies myalgia  Her PCP closely monitor the blood work      PHTN - from JANICE, valvular disease     Morbid obesity -  Unable to lose weight due to inability to exercise    JANICE - now uses CPAP   She does have history of pulmonary hypertension     Past Medical History:  Past Medical History:   Diagnosis Date    Arthritis     Cardiac disease     "leaky valve"    Coronary artery disease     Disease of thyroid gland     Dislocation of right shoulder joint     Hypertension     Murmur, cardiac     Simple goiter     Skin cyst     within the armpits, right         Past Surgical History:  Past Surgical History:   Procedure Laterality Date    BREAST BIOPSY      CARPAL TUNNEL RELEASE      CHOLECYSTECTOMY      DILATION AND CURETTAGE OF UTERUS      HYSTEROSCOPY      JOINT REPLACEMENT      b/l knee     JOINT REPLACEMENT  2007    left hip         Family History:  Family History   Problem Relation Age of Onset    Diabetes Mother     Stroke Mother     Cancer Father     Lung cancer Father     Diabetes Sister     Heart disease Sister     Coronary artery disease Family     Diabetes Family     Hypertension Family     Cancer Family     Stroke Family          Social History:  Social History     Social History    Marital status: Single     Spouse name: N/A    Number of children: N/A    Years of education: N/A     Social History Main Topics    Smoking status: Never Smoker    Smokeless tobacco: Never Used    Alcohol use No    Drug use: No    Sexual activity: No     Other Topics Concern    Not on file     Social History Narrative    Denied drinking coffee    Denied exercise habits             Active Problems:  Patient Active Problem List   Diagnosis    GERD (gastroesophageal reflux disease)    Hypothyroid    Hypertension    [facility]    Hyperlipidemia    Reactive airway disease without complication    JANICE (obstructive sleep apnea)    Non-rheumatic aortic stenosis    Diastolic dysfunction    LVH (left ventricular hypertrophy)    Mitral annular calcification    Mitral valve stenosis    Morbid obesity ([facility] )    Nonrheumatic aortic valve insufficiency    Pulmonary hypertension (HCC)    Shortness of breath    Chronic diastolic (congestive) heart failure (HCC)         The following portions of the patient's history were reviewed and updated as appropriate: past medical history, past surgical history, past family history,  past social history, current medications, allergies and problem list       Review of Systems:  Constitutional: Denies fever, chills, fatigue  Eyes: Denies eye redness, eye discharge, double vision  ENT: Denies hearing loss, tinnitus, sneezing, nasal discharge, sore throat   Respiratory: Denies cough, expectoration, hemoptysis   +shortness of breath  Cardiovascular: Denies chest pain, palpitations, orthopnea, PND, lower extremity swelling  Gastrointestinal: Denies abdominal pain, nausea, vomiting, hematemesis, diarrhea, bloody stools  Genito-Urinary: Denies dysuria, incontinence  Musculoskeletal: Denies back pain, joint pain, muscle pain  Neurologic: Denies confusion, lightheadedness, syncope, headache, focal weakness, sensory changes, seizures  Endocrine: Denies polyuria, polydipsia, temperature intolerance  Allergy and Immunology: Denies hives, insect bite sensitivity  Hematological and Lymphatic: Denies bleeding problems, swollen glands   Psychological: Denies depression, suicidal ideation, anxiety, panic  Dermatological: Denies pruritus, rash, skin lesion changes      Vitals:  Vitals:    08/20/18 1027   BP: 144/80   Pulse: 80   SpO2: 96%       Body mass index is 51 13 kg/m²  Weight (last 2 days)     Date/Time   Weight    08/20/18 1027  99 8 (220)                Physical Examination:  General:   Morbidly obese  Using a walker for support  Patient is not in acute distress  Awake, alert, oriented in time, place and person  Responding to commands  Head: Normocephalic  Atraumatic  Eyes: Both pupils normal sized, round and reactive to light  Nonicteric  ENT: Normal external ear canals  Nares normal, no drainage  Lips and oral mucosa normal  Neck: Supple  JVP not raised  Trachea central  No thyromegaly  Lungs: Bilateral bronchovascular breath sounds with no crackles or rhonchi  Chest wall: No tenderness  Cardiovascular: RRR  Grade 3/6 APOLINAR at base  Gastrointestinal: Abdomen soft, nontender  No guarding or rigidity  Liver and spleen not palpable  Bowel sounds present  Neurologic: Patient is awake, alert, oriented in time, place and person  Responding to command  Moving all extremities  Integumentary:  No skin rash  Lymphatic: No cervical lymphadenopathy  Back: Symmetric   No CVA tenderness  Extremities: No clubbing, cyanosis or edema      Laboratory Results:  CBC with diff:   Lab Results   Component Value Date    WBC 8 75 08/15/2018    RBC 4 21 08/15/2018    HGB 9 8 (L) 08/15/2018    HCT 33 2 (L) 08/15/2018    MCV 79 (L) 08/15/2018    MCH 23 3 (L) 08/15/2018    RDW 19 0 (H) 08/15/2018     (H) 08/15/2018       CMP:  Lab Results   Component Value Date    CREATININE 0 83 08/15/2018    BUN 25 08/15/2018     08/15/2018    K 4 3 08/15/2018     08/15/2018    CO2 31 08/15/2018    GLUCOSE 110 08/15/2018    PROT 7 6 2018    ALKPHOS 86 2018    ALT 18 2018    AST 18 2018       Lab Results   Component Value Date    MG 2 0 2018       Lab Results   Component Value Date    TROPONINI <0 02 2018    TROPONINI <0 02 2018    TROPONINI <0 02 2018       Lipid Profile:   No results found for: CHOL  Lab Results   Component Value Date    HDL 59 08/15/2018    HDL 65 (H) 2018     Lab Results   Component Value Date    LDLCALC 72 08/15/2018    LDLCALC 51 2018     Lab Results   Component Value Date    TRIG 112 08/15/2018    TRIG 83 2018       Cardiac testing:   Results for orders placed during the hospital encounter of 18   Echo complete with contrast if indicated    Narrative [address] [place], [place] [id number] [phone]    Transthoracic Echocardiogram  2D, M-mode, Doppler, and Color Doppler    Study date:  14-Aug-2018    Patient: Rohan Lennon  MR number: [id number]  Account number: [de-identified]  : 1940  Age: 65 years  Gender: Female  Status: Inpatient  Location: Bedside  Height: 57 in  Weight: 224 lb  BP: 139/ 60 mmHg    Indications: Dyspnea, shortness of breath    Diagnoses: R06 00 - Dyspnea, unspecified    Sonographer:  Avila Graham RDCS  Interpreting Physician:  Liliana Moseley MD  Referring Physician:  Suzan Helm PA-C  Group:  [facility]    SUMMARY    LEFT VENTRICLE:  Systolic function was normal  Ejection fraction was estimated in the range of 55 % to 65 %    There were no regional wall motion abnormalities  There was mild concentric hypertrophy  Doppler parameters were consistent with abnormal left ventricular relaxation (grade 1 diastolic dysfunction)  LEFT ATRIUM:  The atrium was mildly dilated  MITRAL VALVE:  There was mild annular calcification  AORTIC VALVE:  The valve was not visualized well enough to rule out a bicuspid morphology  Leaflets exhibited marked calcification and markedly reduced cuspal separation  Transaortic velocity was increased due to valvular stenosis  There was moderate to severe stenosis  There was mild regurgitation  TRICUSPID VALVE:  There was mild regurgitation  Estimated peak PA pressure was 40 mmHg  HISTORY: PRIOR HISTORY: Risk factors: CAD, hypertension, hypercholesterolemia, JANICE and morbid obesity  PROCEDURE: The procedure was performed at the bedside  This was a routine study  The transthoracic approach was used  The study included complete 2D imaging, M-mode, complete spectral Doppler, and color Doppler  The heart rate was 66 bpm,  at the start of the study  Images were obtained from the parasternal, apical, subcostal, and suprasternal notch acoustic windows  Echocardiographic views were limited due to decreased penetration and lung interference  Image quality was  adequate  LEFT VENTRICLE: Size was normal  Systolic function was normal  Ejection fraction was estimated in the range of 55 % to 65 %  There were no regional wall motion abnormalities  There was mild concentric hypertrophy  DOPPLER: Doppler  parameters were consistent with abnormal left ventricular relaxation (grade 1 diastolic dysfunction)  RIGHT VENTRICLE: The size was normal  Systolic function was normal  Wall thickness was normal     LEFT ATRIUM: The atrium was mildly dilated  RIGHT ATRIUM: Size was normal     MITRAL VALVE: There was mild annular calcification  Valve structure was normal  There was normal leaflet separation   DOPPLER: The transmitral velocity was within the normal range  There was no evidence for stenosis  There was no  regurgitation  AORTIC VALVE: The valve was not visualized well enough to rule out a bicuspid morphology  Leaflets exhibited marked calcification and markedly reduced cuspal separation  DOPPLER: Transaortic velocity was increased due to valvular stenosis  There was moderate to severe stenosis  There was mild regurgitation  TRICUSPID VALVE: The valve structure was normal  There was normal leaflet separation  DOPPLER: The transtricuspid velocity was within the normal range  There was no evidence for stenosis  There was mild regurgitation  Estimated peak PA  pressure was 40 mmHg  PULMONIC VALVE: Leaflets exhibited normal thickness, no calcification, and normal cuspal separation  DOPPLER: The transpulmonic velocity was within the normal range  There was no regurgitation  PERICARDIUM: There was no pericardial effusion  The pericardium was normal in appearance  AORTA: The root exhibited normal size  SYSTEMIC VEINS: IVC: The inferior vena cava was normal in size and course  Respirophasic changes were normal     SYSTEM MEASUREMENT TABLES    2D  %FS: 36 9 %  Ao Diam: 3 2 cm  EDV(Teich): 89 2 ml  EF(Teich): 67 %  ESV(Teich): 29 5 ml  IVSd: 1 2 cm  LA Area: 25 9 cm2  LA Diam: 4 1 cm  LVEDV MOD A4C: 83 7 ml  LVEF MOD A4C: 83 2 %  LVESV MOD A4C: 14 ml  LVIDd: 4 4 cm  LVIDs: 2 8 cm  LVLd A4C: 8 2 cm  LVLs A4C: 5 9 cm  LVOT Diam: 2 1 cm  LVPWd: 1 2 cm  RA Area: 18 4 cm2  RVIDd: 4 4 cm  SV MOD A4C: 69 6 ml  SV(Teich): 59 7 ml    CW  AR Dec [place]: 2 4 m/s2  AR Dec Time: 1508 3 ms  AR PHT: 437 4 ms  AR Vmax: 3 6 m/s  AR maxP 4 mmHg  AV Env  Ti: 296 9 ms  AV VTI: 90 6 cm  AV Vmax: 4 1 m/s  AV Vmean: 3 1 m/s  AV maxP 1 mmHg  AV meanP 1 mmHg  TR Vmax: 3 3 m/s  TR maxP 3 mmHg    MM  TAPSE: 2 4 cm    PW  VASU (VTI): 1 3 cm2  VASU Vmax: 1 cm2  E': 0 1 m/s  E/E': 18  LVOT Env  Ti: 388 2 ms  LVOT VTI: 34 7 cm  LVOT Vmax: 1 3 m/s  LVOT Vmean: 0 9 m/s  LVOT maxP 6 mmHg  LVOT meanPG: 3 8 mmHg  LVSV Dopp: 114 6 ml  MV A Jose: 1 4 m/s  MV Dec [place]: 1 8 m/s2  MV DecT: 541 5 ms  MV E Jose: 1 m/s  MV E/A Ratio: 0 7  MV PHT: 157 ms  MVA By PHT: 1 4 cm2    [facility]    Prepared and electronically signed by    Ludwin Tom MD  Signed 14-Aug-2018 18:35:14         Medications:    Current Outpatient Prescriptions:     albuterol (2 5 mg/3 mL) 0 083 % nebulizer solution, Take 3 mL by nebulization every 6 (six) hours as needed for wheezing or shortness of breath, Disp: 75 mL, Rfl: 0    aspirin (ECOTRIN LOW STRENGTH) 81 mg EC tablet, Take 1 tablet by mouth daily, Disp: 30 tablet, Rfl: 0    b complex vitamins capsule, Take 1 capsule by mouth 2 (two) times a day  , Disp: , Rfl:     calcium carbonate (OS-SYLVESTER) 600 MG tablet, Take 1 tablet by mouth 2 (two) times a day  , Disp: , Rfl:     Fesoterodine Fumarate ER (TOVIAZ) 8 MG TB24, Take 8 mg by mouth daily, Disp: , Rfl:     fluticasone (FLONASE) 50 mcg/act nasal spray, 1 spray into each nostril daily, Disp: , Rfl:     fluticasone-vilanterol (BREO ELLIPTA) 100-25 mcg/inh inhaler, Inhale 1 puff daily, Disp: 1 Inhaler, Rfl: 0    furosemide (LASIX) 40 mg tablet, Take 1 tablet (40 mg total) by mouth daily, Disp: 30 tablet, Rfl: 0    levothyroxine 50 mcg tablet, Take 50 mcg by mouth daily, Disp: , Rfl:     loratadine (CLARITIN) 10 mg tablet, Take 10 mg by mouth daily, Disp: , Rfl:     losartan (COZAAR) 50 mg tablet, Take 0 5 tablets (25 mg total) by mouth daily, Disp: 30 tablet, Rfl: 3    omeprazole (PriLOSEC) 40 MG capsule, Take 40 mg by mouth 2 (two) times a day, Disp: , Rfl: 1    sertraline (ZOLOFT) 50 mg tablet, Take 50 mg by mouth daily, Disp: , Rfl:     simvastatin (ZOCOR) 40 mg tablet, Take 40 mg by mouth daily at bedtime, Disp: , Rfl:     temazepam (RESTORIL) 15 mg capsule, Take 15 mg by mouth daily, Disp: , Rfl: 1      Allergies: Allergies   Allergen Reactions    Latex Rash    Neosporin [Neomycin-Bacitracin Zn-Polymyx] Rash and Other (See Comments)     hives per [facility] order         Assessment and Plan:  1  Shortness of breath   likely multifactorial from severe AS, morbid obesity, pulmonary hypertension    2  Severe aortic stenosis   referred to CT surgery for AVR    3  Chronic diastolic (congestive) heart failure (HCC)   low-salt diet  Daily weight  Continue furosemide, losartan  Needs to be on a BB also    4  Mitral annular calcification   follow up with serial echocardiogram    5  Essential hypertension   BP stable  Continue current medications    6  Mixed hyperlipidemia   continue statin and diet control  Her PCP closely monitor the blood work    7  LVH (left ventricular hypertrophy)   tight BP control    8  Non-rheumatic mitral valve stenosis   follow up with serial echocardiogram    9  Pulmonary hypertension (HCC)   likely from JANICE, MS, AS    10  Morbid obesity ([address] )   unable to lose weight    11  Nonrheumatic aortic valve insufficiency   referred for AVR    12  JANICE (obstructive sleep apnea)   regular CPAP use promoted    Recommend aggressive risk factor modification and therapeutic lifestyle changes  Low-salt, low-calorie, low-fat, low-cholesterol diet with regular exercise and to optimize weight  I will defer the ordering and monitoring of necessity lab studies to you, but I am available and happy to review and manage any of the data at your request in the future  Discussed concepts of atherosclerosis, including signs and symptoms of cardiac disease  Previous studies were reviewed  Safety measures were reviewed  Questions were entertained and answered  Patient was advised to report any problems requiring medical attention  Follow-up with PCP and appropriate specialist and lab work as discussed  Return for follow up visit as scheduled or earlier, if needed    Thank you for allowing me to participate in the care and evaluation of your patient  Should you have any questions, please feel free to contact me        Cara Soria MD  8/20/2018,2:18 PM

## 2018-08-21 DIAGNOSIS — N39.0 URINARY TRACT INFECTION WITHOUT HEMATURIA, SITE UNSPECIFIED: Primary | ICD-10-CM

## 2018-08-21 RX ORDER — CIPROFLOXACIN 250 MG/1
250 TABLET, FILM COATED ORAL EVERY 12 HOURS SCHEDULED
Qty: 6 TABLET | Refills: 0 | Status: SHIPPED | OUTPATIENT
Start: 2018-08-21 | End: 2018-08-24

## 2018-08-21 NOTE — PROGRESS NOTES
Looks like Shell Ferreira has a uti  I am calling in cipro to be taken twice daily for 3 days  Stop the iron if she started it and all vitamins until finished with meds

## 2018-08-22 ENCOUNTER — OFFICE VISIT (OUTPATIENT)
Dept: CARDIAC SURGERY | Facility: CLINIC | Age: 78
End: 2018-08-22
Payer: MEDICARE

## 2018-08-22 VITALS
TEMPERATURE: 97.1 F | DIASTOLIC BLOOD PRESSURE: 64 MMHG | WEIGHT: 218 LBS | RESPIRATION RATE: 16 BRPM | OXYGEN SATURATION: 95 % | BODY MASS INDEX: 50.45 KG/M2 | HEART RATE: 82 BPM | HEIGHT: 55 IN | SYSTOLIC BLOOD PRESSURE: 152 MMHG

## 2018-08-22 DIAGNOSIS — I35.0 SEVERE AORTIC STENOSIS: Primary | ICD-10-CM

## 2018-08-22 DIAGNOSIS — I50.32 CHRONIC DIASTOLIC (CONGESTIVE) HEART FAILURE (HCC): ICD-10-CM

## 2018-08-22 DIAGNOSIS — R06.02 SHORTNESS OF BREATH: ICD-10-CM

## 2018-08-22 PROBLEM — E66.01 OBESITY, MORBID (HCC): Status: ACTIVE | Noted: 2018-08-22

## 2018-08-22 PROBLEM — D64.9 ANEMIA: Status: ACTIVE | Noted: 2018-08-22

## 2018-08-22 LAB — BACTERIA UR CULT: ABNORMAL

## 2018-08-22 PROCEDURE — 99205 OFFICE O/P NEW HI 60 MIN: CPT | Performed by: THORACIC SURGERY (CARDIOTHORACIC VASCULAR SURGERY)

## 2018-08-22 NOTE — LETTER
August 22, 2018     Nani Hernandez, [facility]  [address]  [address]    Patient: Yolanda Rivera   YOB: 1940   Date of Visit: 8/22/2018       Dear Dr Pepe Moran:    Thank you for referring Enrique Chin to me for evaluation  Below are my notes for this consultation  If you have questions, please do not hesitate to call me  I look forward to following your patient along with you  Sincerely,        Yumiko Mendoza,         CC: MABEL Abarca, [place]  8/22/2018  2:03 PM  Attested  Consultation - Cardiothoracic Surgery   Yolanda Rivera 65 y o  female MRN: [id number]    Physician Requesting Consult: Dr Pepe Moran    Primary Care Provider: Carrillo Abarca    Reason for Consult / Principal Problem: Aortic stenosis, Non-Rheumatic    History of Present Illness: Yolanda Rivera is a 65y o  year old female who presents for initial outpatient surgical consultation for severe symptomatic aortic stenosis  She was recently hospitalized at [facility] for acute exacerbation of CHF  Work up at that time included TTE and cardiac cath  She was ultimately diagnosed with severe AS  She was started on diuretic therapy and discharged home on oxygen  Upon further questioning, patient states that she has been getting episodes of SOB with activity for several months now  These occur with minimal activity such as ambulating around the house on one level  It is relieved with rest  She denies any presyncope or syncope, CP, palpitations, PND or orhtopnea  She does state she has some lower extremity swelling which has improved since being started on diuretic therapy  She also notes that she is in the process of being worked up for anemia  This was discovered about a month ago on routine lab work  She was told her iron level were low and also she has heme positive stool  She has an appointment to see a GI physician in the near future       She has ongoing dental issues and is scheduled for extraction of 3 teeth on 9/12  She is actively being treated for a UTI  Past Medical History:  Past Medical History:   Diagnosis Date    Anemia 08/22/2018    Aortic valve disease     Arthritis     Cardiac disease     "leaky valve"    [facility]F [facility]congestive [facility] failure) [facility] [address] [facility]     Coronary artery disease     Disease of thyroid gland     Dislocation of right shoulder joint     Hyperlipidemia     Hypertension     Murmur, cardiac     Obesity, morbid ([facility] [address] ) 08/22/2018    [facility] 08/22/2018    Simple goiter     Skin cyst     within the armpits, right         Past Surgical History:   Past Surgical History:   Procedure Laterality Date    BREAST BIOPSY      CARPAL TUNNEL RELEASE      CHOLECYSTECTOMY      DILATION AND CURETTAGE OF UTERUS      HYSTEROSCOPY      JOINT REPLACEMENT      b/l knee     JOINT REPLACEMENT  2007    left hip         Family History:  Family History   Problem Relation Age of Onset    Diabetes Mother     Stroke Mother     Cancer Father     Lung cancer Father     Diabetes Sister     Heart disease Sister     Coronary artery disease Family     Diabetes Family     Hypertension Family     Cancer Family     Stroke Family          Social History:    History   Alcohol Use No     History   Drug Use No     History   Smoking Status    Never Smoker   Smokeless Tobacco    Never Used       Home Medications:   Prior to Admission medications    Medication Sig Start Date End Date Taking?  Authorizing Provider   albuterol (2 5 mg/3 mL) 0 083 % nebulizer solution Take 3 mL by nebulization every 6 (six) hours as needed for wheezing or shortness of breath 10/30/17   Leann Rodriguez MD   aspirin (ECOTRIN LOW STRENGTH) 81 mg EC tablet Take 1 tablet by mouth daily 10/31/17   Leann Rodriguez MD   b complex vitamins capsule Take 1 capsule by mouth 2 (two) times a day      Historical Provider, MD   calcium carbonate (OS-SYLVESTER) 600 MG tablet Take 1 tablet by mouth 2 (two) times a day      Historical Provider, MD   ciprofloxacin (CIPRO) 250 mg tablet Take 1 tablet (250 mg total) by mouth every 12 (twelve) hours for 3 days 8/21/18 8/24/18  MABEL Castro   Fesoterodine Fumarate ER (TOVIAZ) 8 MG TB24 Take 8 mg by mouth daily    Historical Provider, MD   fluticasone (FLONASE) 50 mcg/act nasal spray 1 spray into each nostril daily    Historical Provider, MD   fluticasone-vilanterol (BREO ELLIPTA) 100-25 mcg/inh inhaler Inhale 1 puff daily 8/13/18   MABEL Castro   furosemide (LASIX) 40 mg tablet Take 1 tablet (40 mg total) by mouth daily 8/16/18   Zan Becker MD   levothyroxine 50 mcg tablet Take 50 mcg by mouth daily    Historical Provider, MD   loratadine (CLARITIN) 10 mg tablet Take 10 mg by mouth daily    Historical Provider, MD   losartan (COZAAR) 50 mg tablet Take 0 5 tablets (25 mg total) by mouth daily 8/13/18   MABEL Castro   omeprazole (PriLOSEC) 40 MG capsule Take 40 mg by mouth 2 (two) times a day 6/23/18   Historical Provider, MD   sertraline (ZOLOFT) 50 mg tablet Take 50 mg by mouth daily    Historical Provider, MD   simvastatin (ZOCOR) 40 mg tablet Take 40 mg by mouth daily at bedtime    Historical Provider, MD   temazepam (RESTORIL) 15 mg capsule Take 15 mg by mouth daily 7/2/18   Historical Provider, MD       Allergies: Allergies   Allergen Reactions    Latex Rash    Neosporin [Neomycin-Bacitracin Zn-Polymyx] Rash and Other (See Comments)     hives per [facility] order       Review of Systems:  Review of Systems   Constitutional: Negative for activity change, diaphoresis, fatigue and fever  HENT: Negative for congestion and sore throat  Respiratory: Positive for shortness of breath  Negative for apnea, cough, choking, chest tightness and wheezing  Cardiovascular: Positive for leg swelling  Negative for chest pain and palpitations  Gastrointestinal: Positive for blood in stool   Negative for abdominal distention, abdominal pain, constipation, diarrhea and nausea  Genitourinary: Negative for difficulty urinating, dysuria, hematuria and urgency  Musculoskeletal: Positive for arthralgias and gait problem  Negative for joint swelling and myalgias  Neurological: Negative for dizziness, seizures, syncope, weakness, light-headedness, numbness and headaches  Hematological: Negative for adenopathy  Psychiatric/Behavioral: Negative for agitation and behavioral problems  The patient is not nervous/anxious  Vital Signs:     Vitals:    08/22/18 1300 08/22/18 1309   BP: 150/70 152/64   BP Location: Left arm Right arm   Cuff Size: Adult    Pulse: 82    Resp: 16    Temp: (!) 97 1 °F (36 2 °C)    TempSrc: Oral    SpO2: 95%    Weight: 98 9 kg (218 lb)    Height: 4' 7" (1 397 m)        Physical Exam:  Physical Exam   Constitutional: She is oriented to person, place, and time  She appears well-developed and well-nourished  No distress  HENT:   Head: Normocephalic and atraumatic  Mouth/Throat: Oropharynx is clear and moist  No oropharyngeal exudate  Eyes: Conjunctivae and EOM are normal  Pupils are equal, round, and reactive to light  No scleral icterus  Neck: Normal range of motion  Neck supple  No tracheal deviation present  No thyromegaly present  Cardiovascular: Normal rate, regular rhythm, normal heart sounds and intact distal pulses  No murmur heard  Pulmonary/Chest: Effort normal and breath sounds normal  No respiratory distress  She has no wheezes  She has no rales  Abdominal: Soft  Bowel sounds are normal  She exhibits no distension  There is no tenderness  Musculoskeletal: Normal range of motion  She exhibits edema  She exhibits no tenderness or deformity  Lymphadenopathy:     She has no cervical adenopathy  Neurological: She is alert and oriented to person, place, and time  No cranial nerve deficit  Skin: Skin is warm and dry  No rash noted  She is not diaphoretic  No erythema   No pallor  Psychiatric: She has a normal mood and affect  Her behavior is normal  Judgment and thought content normal    Nursing note and vitals reviewed  Lab Results:                 No results found for: HGBA1C  Lab Results   Component Value Date    TROPONINI <0 02 2018       Imaging Studies:   Cardiac Cath: 18  CORONARY CIRCULATION:  Left main: Normal   LAD: The vessel was normal sized  Angiography showed minor luminal irregularities  Circumflex: The vessel was small sized  Angiography showed minor luminal irregularities  Ramus intermedius: The vessel was normal sized  Angiography showed minor luminal irregularities  RCA: The vessel was large sized (dominant)  Angiography showed minor luminal irregularities      CARDIAC STRUCTURES:  There was severe aortic stenosis      HEMODYNAMICS:  Hemodynamic assessment demonstrated mildly elevated LVEDP, mildly depressed cardiac output, and mildly elevated pulmonary capillary wedge pressure  There was mild left sided failure  There was mild pulmonary hypertension  Echocardiogram: 18  SUMMARY     LEFT VENTRICLE:  Systolic function was normal  Ejection fraction was estimated in the range of 55 % to 65 %  There were no regional wall motion abnormalities  There was mild concentric hypertrophy  Doppler parameters were consistent with abnormal left ventricular relaxation (grade 1 diastolic dysfunction)      LEFT ATRIUM:  The atrium was mildly dilated      MITRAL VALVE:  There was mild annular calcification      AORTIC VALVE:  The valve was not visualized well enough to rule out a bicuspid morphology  Leaflets exhibited marked calcification and markedly reduced cuspal separation  Transaortic velocity was increased due to valvular stenosis  There was moderate to severe stenosis  There was mild regurgitation      TRICUSPID VALVE:  There was mild regurgitation  Estimated peak PA pressure was 40 mmHg      AV maxP 1 mmHg  AV meanP 1 mmHg    I have personally reviewed pertinent reports  TAVR evaluation Assessment:     Robert López 122: III    KCCQ-12 completed    6 Meter Walk Test: 9/9/9    STS risk: 2 3    Assessment:  Patient Active Problem List    Diagnosis Date Noted    Anemia 08/22/2018    [facility] 08/22/2018    Shortness of breath 08/14/2018    Chronic diastolic (congestive) heart failure ([facility] [address] ) 08/14/2018    Severe aortic stenosis 07/03/2018    Reactive airway disease without complication 85/53/9370    JANICE (obstructive sleep apnea) 01/31/2018    Hyperlipidemia 12/04/2017    Hypertension 12/03/2017    Community acquired pneumonia of left lower lobe of lung ([facility] [address] ) 12/03/2017    GERD (gastroesophageal reflux disease) 10/29/2017    Hypothyroid 52/48/9116    Diastolic dysfunction 20/41/4530    LVH (left ventricular hypertrophy) 09/22/2016    Mitral annular calcification 09/22/2016    Mitral valve stenosis 09/22/2016    Pulmonary hypertension ([facility] [address] ) 09/22/2016    Morbid obesity ([facility] ) 06/02/2016     Severe aortic stenosis; Ongoing TAVR workup    Plan:    Dawson Prater has severe symptomatic aortic stenosis  Based on their STS risk assessment, they will undergo the following testing for transcatheter aortic valve replacement: Gated CTA of the chest/abdomen/pelvis, 3D GARRISON, left and right cardiac catheterization, dental clearance, pulmonary function tests with ABG, and carotid artery ultrasound  Once these studies have been completed, Dawson Prater will follow up in our office to review the results and to be evaluated by a second surgeon to confirm the suitability of proceeding with transcatheter aortic valve replacment  Dawson Prater was comfortable with our recommendations, and their questions were answered to their satisfaction  We will continue to evaluate the patient daily with further recommendations as work up is completed  Thank you for allowing us to participate in the care of this patient       SIGNATURE: Alisa Garcia PA-C  DATE: August 22, 2018  TIME: 1:56 PM  Attestation signed by Benton Vital DO at 8/22/2018  2:59 PM:  The patient was seen and examined, and I agree with the midlevel's history, physical exam, assessment and plan with the following additions:    Jorge Posadas has severe aortic stenosis  She also has associated shortness of breath  Her echocardiogram was reviewed demonstrating severe aortic stenosis and calcific aortic valve with leaflet excursion reduced  Given her comorbidities she is intermediate to high risk for open surgical aortic valve replacement  I recommended transcatheter aortic valve replacement  The risks, benefits, and alternatives to TAVR were discussed in detail with the patient  She wishes to proceed to complete her preoperative testing for TAVR

## 2018-08-22 NOTE — PROGRESS NOTES
Consultation - Cardiothoracic Surgery   Eric Ornelas 65 y o  female MRN: [id number]    Physician Requesting Consult: Dr Kianna Ellison    Primary Care Provider: Carrillo Banda    Reason for Consult / Principal Problem: Aortic stenosis, Non-Rheumatic    History of Present Illness: Eric Ornelas is a 65y o  year old female who presents for initial outpatient surgical consultation for severe symptomatic aortic stenosis  She was recently hospitalized at [facility] for acute exacerbation of CHF  Work up at that time included TTE and cardiac cath  She was ultimately diagnosed with severe AS  She was started on diuretic therapy and discharged home on oxygen  Upon further questioning, patient states that she has been getting episodes of SOB with activity for several months now  These occur with minimal activity such as ambulating around the house on one level  It is relieved with rest  She denies any presyncope or syncope, CP, palpitations, PND or orhtopnea  She does state she has some lower extremity swelling which has improved since being started on diuretic therapy  She also notes that she is in the process of being worked up for anemia  This was discovered about a month ago on routine lab work  She was told her iron level were low and also she has heme positive stool  She has an appointment to see a GI physician in the near future  She has ongoing dental issues and is scheduled for extraction of 3 teeth on 9/12  She is actively being treated for a UTI       Past Medical History:  Past Medical History:   Diagnosis Date    Anemia 08/22/2018    Aortic valve disease     Arthritis     Cardiac disease     "leaky valve"    CHF (congestive heart failure) (HCC)     Coronary artery disease     Disease of thyroid gland     Dislocation of right shoulder joint     Hyperlipidemia     Hypertension     Murmur, cardiac     Obesity, morbid ([facility] [address] ) 08/22/2018    Pulmonary hypertension ([facility] [address] ) 08/22/2018  Simple goiter     Skin cyst     within the armpits, right         Past Surgical History:   Past Surgical History:   Procedure Laterality Date    BREAST BIOPSY      CARPAL TUNNEL RELEASE      CHOLECYSTECTOMY      DILATION AND CURETTAGE OF UTERUS      HYSTEROSCOPY      JOINT REPLACEMENT      b/l knee     JOINT REPLACEMENT  2007    left hip         Family History:  Family History   Problem Relation Age of Onset    Diabetes Mother     Stroke Mother     Cancer Father     Lung cancer Father     Diabetes Sister     Heart disease Sister     Coronary artery disease Family     Diabetes Family     Hypertension Family     Cancer Family     Stroke Family          Social History:    History   Alcohol Use No     History   Drug Use No     History   Smoking Status    Never Smoker   Smokeless Tobacco    Never Used       Home Medications:   Prior to Admission medications    Medication Sig Start Date End Date Taking?  Authorizing Provider   albuterol (2 5 mg/3 mL) 0 083 % nebulizer solution Take 3 mL by nebulization every 6 (six) hours as needed for wheezing or shortness of breath 10/30/17   Luci Nieto MD   aspirin (ECOTRIN LOW STRENGTH) 81 mg EC tablet Take 1 tablet by mouth daily 10/31/17   Luci Nieto MD   b complex vitamins capsule Take 1 capsule by mouth 2 (two) times a day      Historical Provider, MD   calcium carbonate (OS-SYLVESTER) 600 MG tablet Take 1 tablet by mouth 2 (two) times a day      Historical Provider, MD   ciprofloxacin (CIPRO) 250 mg tablet Take 1 tablet (250 mg total) by mouth every 12 (twelve) hours for 3 days 8/21/18 8/24/18  MABEL Castro   Fesoterodine Fumarate ER (TOVIAZ) 8 MG TB24 Take 8 mg by mouth daily    Historical Provider, MD   fluticasone (FLONASE) 50 mcg/act nasal spray 1 spray into each nostril daily    Historical Provider, MD   fluticasone-vilanterol (BREO ELLIPTA) 100-25 mcg/inh inhaler Inhale 1 puff daily 8/13/18   MABEL Sullivan furosemide (LASIX) 40 mg tablet Take 1 tablet (40 mg total) by mouth daily 8/16/18   Erich Mabry MD   levothyroxine 50 mcg tablet Take 50 mcg by mouth daily    Historical Provider, MD   loratadine (CLARITIN) 10 mg tablet Take 10 mg by mouth daily    Historical Provider, MD   losartan (COZAAR) 50 mg tablet Take 0 5 tablets (25 mg total) by mouth daily 8/13/18   MABEL Castro   omeprazole (PriLOSEC) 40 MG capsule Take 40 mg by mouth 2 (two) times a day 6/23/18   Historical Provider, MD   sertraline (ZOLOFT) 50 mg tablet Take 50 mg by mouth daily    Historical Provider, MD   simvastatin (ZOCOR) 40 mg tablet Take 40 mg by mouth daily at bedtime    Historical Provider, MD   temazepam (RESTORIL) 15 mg capsule Take 15 mg by mouth daily 7/2/18   Historical Provider, MD       Allergies: Allergies   Allergen Reactions    Latex Rash    Neosporin [Neomycin-Bacitracin Zn-Polymyx] Rash and Other (See Comments)     hives per [facility] order       Review of Systems:  Review of Systems   Constitutional: Negative for activity change, diaphoresis, fatigue and fever  HENT: Negative for congestion and sore throat  Respiratory: Positive for shortness of breath  Negative for apnea, cough, choking, chest tightness and wheezing  Cardiovascular: Positive for leg swelling  Negative for chest pain and palpitations  Gastrointestinal: Positive for blood in stool  Negative for abdominal distention, abdominal pain, constipation, diarrhea and nausea  Genitourinary: Negative for difficulty urinating, dysuria, hematuria and urgency  Musculoskeletal: Positive for arthralgias and gait problem  Negative for joint swelling and myalgias  Neurological: Negative for dizziness, seizures, syncope, weakness, light-headedness, numbness and headaches  Hematological: Negative for adenopathy  Psychiatric/Behavioral: Negative for agitation and behavioral problems  The patient is not nervous/anxious          Vital Signs: Vitals:    08/22/18 1300 08/22/18 1309   BP: 150/70 152/64   BP Location: Left arm Right arm   Cuff Size: Adult    Pulse: 82    Resp: 16    Temp: (!) 97 1 °F (36 2 °C)    TempSrc: Oral    SpO2: 95%    Weight: 98 9 kg (218 lb)    Height: 4' 7" (1 397 m)        Physical Exam:  Physical Exam   Constitutional: She is oriented to person, place, and time  She appears well-developed and well-nourished  No distress  HENT:   Head: Normocephalic and atraumatic  Mouth/Throat: Oropharynx is clear and moist  No oropharyngeal exudate  Eyes: Conjunctivae and EOM are normal  Pupils are equal, round, and reactive to light  No scleral icterus  Neck: Normal range of motion  Neck supple  No tracheal deviation present  No thyromegaly present  Cardiovascular: Normal rate, regular rhythm, normal heart sounds and intact distal pulses  No murmur heard  Pulmonary/Chest: Effort normal and breath sounds normal  No respiratory distress  She has no wheezes  She has no rales  Abdominal: Soft  Bowel sounds are normal  She exhibits no distension  There is no tenderness  Musculoskeletal: Normal range of motion  She exhibits edema  She exhibits no tenderness or deformity  Lymphadenopathy:     She has no cervical adenopathy  Neurological: She is alert and oriented to person, place, and time  No cranial nerve deficit  Skin: Skin is warm and dry  No rash noted  She is not diaphoretic  No erythema  No pallor  Psychiatric: She has a normal mood and affect  Her behavior is normal  Judgment and thought content normal    Nursing note and vitals reviewed  Lab Results:                 No results found for: HGBA1C  Lab Results   Component Value Date    TROPONINI <0 02 08/14/2018       Imaging Studies:   Cardiac Cath: 8/16/18  CORONARY CIRCULATION:  Left main: Normal   LAD: The vessel was normal sized  Angiography showed minor luminal irregularities  Circumflex: The vessel was small sized   Angiography showed minor luminal irregularities  Ramus intermedius: The vessel was normal sized  Angiography showed minor luminal irregularities  RCA: The vessel was large sized (dominant)  Angiography showed minor luminal irregularities      CARDIAC STRUCTURES:  There was severe aortic stenosis      HEMODYNAMICS:  Hemodynamic assessment demonstrated mildly elevated LVEDP, mildly depressed cardiac output, and mildly elevated pulmonary capillary wedge pressure  There was mild left sided failure  There was mild pulmonary hypertension  Echocardiogram: 18  SUMMARY     LEFT VENTRICLE:  Systolic function was normal  Ejection fraction was estimated in the range of 55 % to 65 %  There were no regional wall motion abnormalities  There was mild concentric hypertrophy  Doppler parameters were consistent with abnormal left ventricular relaxation (grade 1 diastolic dysfunction)      LEFT ATRIUM:  The atrium was mildly dilated      MITRAL VALVE:  There was mild annular calcification      AORTIC VALVE:  The valve was not visualized well enough to rule out a bicuspid morphology  Leaflets exhibited marked calcification and markedly reduced cuspal separation  Transaortic velocity was increased due to valvular stenosis  There was moderate to severe stenosis  There was mild regurgitation      TRICUSPID VALVE:  There was mild regurgitation  Estimated peak PA pressure was 40 mmHg  AV maxP 1 mmHg  AV meanP 1 mmHg    I have personally reviewed pertinent reports        TAVR evaluation Assessment:     Robert López 122: III    KCCQ-12 completed    6 Meter Walk Test:     STS risk: 2 3    Assessment:  Patient Active Problem List    Diagnosis Date Noted    Anemia 2018    Obesity, morbid ([address] ) 2018    Shortness of breath 2018    Chronic diastolic (congestive) heart failure ([address] ) 2018    Severe aortic stenosis 2018    Reactive airway disease without complication     JANICE (obstructive sleep apnea) 2018  Hyperlipidemia 12/04/2017    Hypertension 12/03/2017    Community acquired pneumonia of left lower lobe of lung ([facility] [address] ) 12/03/2017    GERD (gastroesophageal reflux disease) 10/29/2017    Hypothyroid 19/36/0590    Diastolic dysfunction 61/82/1017    LVH (left ventricular hypertrophy) 09/22/2016    Mitral annular calcification 09/22/2016    Mitral valve stenosis 09/22/2016    Pulmonary hypertension ([facility] [address] ) 09/22/2016    [facility] 06/02/2016     Severe aortic stenosis; Ongoing TAVR workup    Plan:    Shima Arango has severe symptomatic aortic stenosis  Based on their STS risk assessment, they will undergo the following testing for transcatheter aortic valve replacement: Gated CTA of the chest/abdomen/pelvis, 3D GARRISON, left and right cardiac catheterization, dental clearance, pulmonary function tests with ABG, and carotid artery ultrasound  Once these studies have been completed, Shima Arango will follow up in our office to review the results and to be evaluated by a second surgeon to confirm the suitability of proceeding with transcatheter aortic valve replacment  Shima Arango was comfortable with our recommendations, and their questions were answered to their satisfaction  We will continue to evaluate the patient daily with further recommendations as work up is completed  Thank you for allowing us to participate in the care of this patient       SIGNATURE: Osbaldo Khoury PA-C  DATE: August 22, 2018  TIME: 1:56 PM

## 2018-08-23 ENCOUNTER — TELEPHONE (OUTPATIENT)
Dept: FAMILY MEDICINE CLINIC | Facility: CLINIC | Age: 78
End: 2018-08-23

## 2018-08-23 ENCOUNTER — OFFICE VISIT (OUTPATIENT)
Dept: PULMONOLOGY | Facility: CLINIC | Age: 78
End: 2018-08-23
Payer: MEDICARE

## 2018-08-23 VITALS — SYSTOLIC BLOOD PRESSURE: 120 MMHG | HEART RATE: 67 BPM | DIASTOLIC BLOOD PRESSURE: 60 MMHG | OXYGEN SATURATION: 94 %

## 2018-08-23 DIAGNOSIS — I10 ESSENTIAL HYPERTENSION: Primary | ICD-10-CM

## 2018-08-23 DIAGNOSIS — R09.02 HYPOXIA: ICD-10-CM

## 2018-08-23 DIAGNOSIS — R06.02 SHORTNESS OF BREATH: Primary | ICD-10-CM

## 2018-08-23 DIAGNOSIS — I27.20 PULMONARY HYPERTENSION (HCC): Chronic | ICD-10-CM

## 2018-08-23 DIAGNOSIS — I35.0 SEVERE AORTIC STENOSIS: ICD-10-CM

## 2018-08-23 DIAGNOSIS — G47.33 OSA (OBSTRUCTIVE SLEEP APNEA): ICD-10-CM

## 2018-08-23 PROCEDURE — 99214 OFFICE O/P EST MOD 30 MIN: CPT | Performed by: PHYSICIAN ASSISTANT

## 2018-08-23 PROCEDURE — 94618 PULMONARY STRESS TESTING: CPT | Performed by: PHYSICIAN ASSISTANT

## 2018-08-23 NOTE — PROGRESS NOTES
Assessment:    1  Shortness of breath  POCT 6 minute walk   2  Severe aortic stenosis     3  Pulmonary hypertension (HCC)  Home Oxygen Portability Evaluation Only    Portable Concentrators   4  JANICE (obstructive sleep apnea)     5  Hypoxia  POCT 6 minute walk    Home Oxygen Portability Evaluation Only    Portable Concentrators       Plan: 65 y o  female female never smoker with significant past medical history of aortic stenosis, HTN, JANICE on CPAP, HLD, pulmonary hypertension, morbid obesity who presents for follow-up after recent hospitalization from 8/13/2018 to 8/16/2018 for acute shortness of breath  1   Dyspnea on exertion - 2/2 severe aortic stenosis and pulmonary HTN  Patient is currently under evaluation for TAVR  Plans to complete GARRISON, complete PFTs, CTA of chest abdomen and pelvis, carotid ultrasound  Reviewed results of RHC with peak PA pressure of 46 mmHg, diastolic of 15 mmHg  Elevated pulmonary capillary wedge pressure of 17/18/14 and LVEDP  PVR of 3 33  DPG <7, TPG <12  Degree of pulmonary hypertension seems to correlate with left heart disease  Will hold off on addition of pulmonary hypertension medication  Suspect symptoms/pressures will significantly improved following TAVR  Patient understands  Recommend discontinuing Breo 100/25 and albuterol, as symptoms do not seem consistent with asthma or reactive airway disease  Repeated 6 minutes walk test in office  On room air at rest, SpO2 of 91%  On room air with exertion, SpO2 dropped to 83 % at the lowest with heart rate in the 80 to 90s  Patient was then placed on 2 liters via nasal cannula and maintained SpO2 greater than 90% throughout with heart rate in the 70 to 80s  Recommend patient continue to use oxygen at 2 liters via nasal cannula  Will send in order for portable oxygen evaluation via [facility]  Will follow up on results of complete PFTs with DLCO    2   JANICE on CPAP -continue CPAP nightly    Follow-up in 3 months or sooner if needed  Subjective:     Patient ID: Yolie Mckenna is a 65 y o  female  Chief Complaint: SOB    HPI  65 y o  female female never smoker with significant past medical history of aortic stenosis, HTN, JANICE on CPAP, HLD, pulmonary hypertension, morbid obesity who presents for follow-up after recent hospitalization from 8/13/2018 to 8/16/2018 for acute shortness of breath  She was evaluated by Cardiology and found to have severe aortic stenosis on repeat echo and was recommended to follow up with outpatient cardiologist in [facility] for evaluation for TAVR  Right heart catheterization/left heart catheterization completed during this hospitalization as well  Patient was also discharged home on oxygen and told to continue this at 2 liters via nasal cannula  Patient states she has been using the large tank to get around  However, she is hoping to get smaller tanks from the [employer]  Patient states she followed up with a cardiologist and [facility] and is in the process of completing testing for proceeding with TAVR  She is scheduled to get GARRISON, PFTs, CTA of chest abdomen and pelvis, and carotid ultrasound study completed  Patient states she has not been taking the [facility] JAIRO since discharge  However, yesterday she decided to take it  She does not notice any difference in her breathing with her without this medication  She denies any fever, chills, chest pain, cough/mucus production  She continues to use her CPAP nightly as instructed  Patient is currently in the process of being worked for anemia  States she will be following up with GI soon for blood in the stool  Review of Systems  Review of Systems   Constitution: Negative for chills, decreased appetite, fever, malaise/fatigue and weight gain  HENT: Negative for congestion  Eyes: Negative for visual disturbance  Cardiovascular: Positive for dyspnea on exertion  Negative for leg swelling and orthopnea     Respiratory: Positive for shortness of breath  Negative for cough, sleep disturbances due to breathing and wheezing  Hematologic/Lymphatic: Negative for adenopathy  Skin: Negative for rash  Musculoskeletal: Negative for muscle weakness  Gastrointestinal: Negative for abdominal pain, diarrhea, nausea and vomiting  Neurological: Negative for excessive daytime sleepiness  Psychiatric/Behavioral: Negative for altered mental status and hallucinations  Allergic/Immunologic: Negative for environmental allergies  Objective:  /60   Pulse 67   SpO2 94% Comment: on 2 liters o2    Physical Exam   Constitutional: She is oriented to person, place, and time  She appears well-developed  Obese   HENT:   Head: Normocephalic and atraumatic  Neck: Normal range of motion  Cardiovascular: Normal rate and regular rhythm  Murmur heard  Systolic murmur   Pulmonary/Chest: Effort normal and breath sounds normal    Abdominal: Soft  There is no tenderness  Musculoskeletal: Normal range of motion  She exhibits no edema or tenderness  Lymphadenopathy:     She has no cervical adenopathy  Neurological: She is alert and oriented to person, place, and time  Skin: Skin is warm and dry  Psychiatric: She has a normal mood and affect  Her behavior is normal    Nursing note and vitals reviewed  Lab/Image Review: Reviewed all pertinent labs/radiology results       Past Medical History:   Diagnosis Date    Anemia 08/22/2018    Aortic valve disease     Arthritis     Cardiac disease     "leaky valve"    CHF (congestive heart failure) (HCC)     Coronary artery disease     Disease of thyroid gland     Dislocation of right shoulder joint     Hyperlipidemia     Hypertension     Hypothyroid 08/22/2018    Murmur, cardiac     Obesity, morbid ([address] ) 08/22/2018    Pulmonary hypertension [facility] ) 08/22/2018    Simple goiter     Skin cyst     within the armpits, right       Social History   Substance Use Topics    Smoking status: Never Smoker    Smokeless tobacco: Never Used    Alcohol use No       Family History   Problem Relation Age of Onset    Diabetes Mother     Stroke Mother     Cancer Father     Lung cancer Father     Diabetes Sister     Heart disease Sister     Coronary artery disease Family     Diabetes Family     Hypertension Family     Cancer Family     Stroke Family        Patient Active Problem List   Diagnosis    GERD (gastroesophageal reflux disease)    Hypothyroid    Hypertension    [facility]    Hyperlipidemia    Reactive airway disease without complication    JANICE (obstructive sleep apnea)    Severe aortic stenosis    Diastolic dysfunction    LVH (left ventricular hypertrophy)    Mitral annular calcification    Mitral valve stenosis    Morbid obesity ([facility] )    Pulmonary hypertension ([facility] )    Shortness of breath    Chronic diastolic (congestive) heart failure (HCC)    Anemia    Obesity, morbid ([facility] )       Current Outpatient Prescriptions on File Prior to Visit   Medication Sig Dispense Refill    albuterol (2 5 mg/3 mL) 0 083 % nebulizer solution Take 3 mL by nebulization every 6 (six) hours as needed for wheezing or shortness of breath 75 mL 0    aspirin (ECOTRIN LOW STRENGTH) 81 mg EC tablet Take 1 tablet by mouth daily 30 tablet 0    b complex vitamins capsule Take 1 capsule by mouth 2 (two) times a day        calcium carbonate (OS-SYLVESTER) 600 MG tablet Take 1 tablet by mouth 2 (two) times a day        ciprofloxacin (CIPRO) 250 mg tablet Take 1 tablet (250 mg total) by mouth every 12 (twelve) hours for 3 days 6 tablet 0    Fesoterodine Fumarate ER (TOVIAZ) 8 MG TB24 Take 8 mg by mouth daily      fluticasone (FLONASE) 50 mcg/act nasal spray 1 spray into each nostril daily      furosemide (LASIX) 40 mg tablet Take 1 tablet (40 mg total) by mouth daily 30 tablet 0    levothyroxine 50 mcg tablet Take 50 mcg by mouth daily      loratadine (CLARITIN) 10 mg tablet Take 10 mg by mouth daily      losartan (COZAAR) 50 mg tablet Take 0 5 tablets (25 mg total) by mouth daily 30 tablet 3    omeprazole (PriLOSEC) 40 MG capsule Take 40 mg by mouth 2 (two) times a day  1    sertraline (ZOLOFT) 50 mg tablet Take 50 mg by mouth daily      simvastatin (ZOCOR) 40 mg tablet Take 40 mg by mouth daily at bedtime      temazepam (RESTORIL) 15 mg capsule Take 15 mg by mouth daily  1    [DISCONTINUED] fluticasone-vilanterol (BREO ELLIPTA) 100-25 mcg/inh inhaler Inhale 1 puff daily 1 Inhaler 0     No current facility-administered medications on file prior to visit          Allergies   Allergen Reactions    Latex Rash    Neosporin [Neomycin-Bacitracin Zn-Polymyx] Rash and Other (See Comments)     hives per [facility] order

## 2018-08-23 NOTE — TELEPHONE ENCOUNTER
Please let her know her last K+ in the hospital was normal, but since she will be staying on lasix will repeat a bmp - non fasting  Slip in emr

## 2018-08-23 NOTE — TELEPHONE ENCOUNTER
Pt was wondering if she should not be taking potassium because she is on a water pill? Her sister was told to take it by another doctor when she was put on them

## 2018-08-24 ENCOUNTER — TELEPHONE (OUTPATIENT)
Dept: NON INVASIVE DIAGNOSTICS | Facility: HOSPITAL | Age: 78
End: 2018-08-24

## 2018-08-27 ENCOUNTER — HOSPITAL ENCOUNTER (OUTPATIENT)
Dept: NON INVASIVE DIAGNOSTICS | Facility: HOSPITAL | Age: 78
Discharge: HOME | End: 2018-08-27
Payer: MEDICARE

## 2018-08-27 ENCOUNTER — ANESTHESIA (OUTPATIENT)
Dept: NON INVASIVE DIAGNOSTICS | Facility: HOSPITAL | Age: 78
End: 2018-08-27

## 2018-08-27 ENCOUNTER — ANESTHESIA EVENT (OUTPATIENT)
Dept: NON INVASIVE DIAGNOSTICS | Facility: HOSPITAL | Age: 78
End: 2018-08-27

## 2018-08-27 VITALS
HEART RATE: 86 BPM | OXYGEN SATURATION: 97 % | SYSTOLIC BLOOD PRESSURE: 113 MMHG | RESPIRATION RATE: 18 BRPM | DIASTOLIC BLOOD PRESSURE: 78 MMHG

## 2018-08-27 DIAGNOSIS — I35.0 SEVERE AORTIC STENOSIS: ICD-10-CM

## 2018-08-27 PROCEDURE — 93312 ECHO TRANSESOPHAGEAL: CPT

## 2018-08-27 PROCEDURE — 76377 3D RENDER W/INTRP POSTPROCES: CPT

## 2018-08-27 PROCEDURE — 93320 DOPPLER ECHO COMPLETE: CPT | Performed by: INTERNAL MEDICINE

## 2018-08-27 PROCEDURE — 93325 DOPPLER ECHO COLOR FLOW MAPG: CPT | Performed by: INTERNAL MEDICINE

## 2018-08-27 PROCEDURE — 93312 ECHO TRANSESOPHAGEAL: CPT | Performed by: INTERNAL MEDICINE

## 2018-08-27 RX ORDER — GLYCOPYRROLATE 0.2 MG/ML
INJECTION INTRAMUSCULAR; INTRAVENOUS AS NEEDED
Status: DISCONTINUED | OUTPATIENT
Start: 2018-08-27 | End: 2018-08-27 | Stop reason: SURG

## 2018-08-27 RX ORDER — PROPOFOL 10 MG/ML
INJECTION, EMULSION INTRAVENOUS CONTINUOUS PRN
Status: DISCONTINUED | OUTPATIENT
Start: 2018-08-27 | End: 2018-08-27 | Stop reason: SURG

## 2018-08-27 RX ORDER — FENTANYL CITRATE 50 UG/ML
INJECTION, SOLUTION INTRAMUSCULAR; INTRAVENOUS AS NEEDED
Status: DISCONTINUED | OUTPATIENT
Start: 2018-08-27 | End: 2018-08-27 | Stop reason: SURG

## 2018-08-27 RX ORDER — PROPOFOL 10 MG/ML
INJECTION, EMULSION INTRAVENOUS AS NEEDED
Status: DISCONTINUED | OUTPATIENT
Start: 2018-08-27 | End: 2018-08-27 | Stop reason: SURG

## 2018-08-27 RX ORDER — SODIUM CHLORIDE 9 MG/ML
INJECTION, SOLUTION INTRAVENOUS CONTINUOUS PRN
Status: DISCONTINUED | OUTPATIENT
Start: 2018-08-27 | End: 2018-08-27 | Stop reason: SURG

## 2018-08-27 RX ADMIN — PROPOFOL 70 MG: 10 INJECTION, EMULSION INTRAVENOUS at 11:33

## 2018-08-27 RX ADMIN — PROPOFOL 100 MCG/KG/MIN: 10 INJECTION, EMULSION INTRAVENOUS at 11:33

## 2018-08-27 RX ADMIN — LIDOCAINE HYDROCHLORIDE 80 MG: 20 INJECTION, SOLUTION INTRAVENOUS at 11:33

## 2018-08-27 RX ADMIN — SODIUM CHLORIDE: 0.9 INJECTION, SOLUTION INTRAVENOUS at 11:10

## 2018-08-27 RX ADMIN — TOPICAL ANESTHETIC 1 SPRAY: 200 SPRAY DENTAL; PERIODONTAL at 11:30

## 2018-08-27 RX ADMIN — GLYCOPYRROLATE 0.1 MG: 0.2 INJECTION, SOLUTION INTRAMUSCULAR; INTRAVENOUS at 11:15

## 2018-08-27 RX ADMIN — PROPOFOL 30 MG: 10 INJECTION, EMULSION INTRAVENOUS at 11:43

## 2018-08-27 RX ADMIN — FENTANYL CITRATE 25 MCG: 50 INJECTION, SOLUTION INTRAMUSCULAR; INTRAVENOUS at 11:33

## 2018-08-27 NOTE — DISCHARGE INSTRUCTIONS
Transesophageal Echocardiogram   WHAT YOU NEED TO KNOW:   A transesophageal echocardiogram (GARRISON) is a procedure used to check for problems with your heart  It will also show any problems in the blood vessels near your heart  Sound waves are sent to the heart through a tube inserted into your throat  The sound waves show the structure and function of your heart through pictures on a monitor  DISCHARGE INSTRUCTIONS:   Rest:  Rest until you are fully awake  You may be sleepy for a while after your procedure  Do not eat or drink until you are able to swallow normally:  Start with soft foods, such as oatmeal, yogurt, or applesauce  Once you can eat soft foods easily, you may begin to eat solid foods  Follow up with your healthcare provider as directed:  Write down your questions so you remember to ask them during your visits  Contact your healthcare provider if:   · You have a fever or chills  · You taste blood in your mouth  · You have a severe sore throat or difficulty swallowing  · You have questions or concerns about your condition or care  Seek care immediately or call 911 if:   · You have any of the following signs of a heart attack:      ¨ Squeezing, pressure, or pain in your chest that lasts longer than 5 minutes or returns    ¨ Discomfort or pain in your back, neck, jaw, stomach, or arm     ¨ Trouble breathing    ¨ Nausea or vomiting    ¨ Lightheadedness or a sudden cold sweat, especially with chest pain or trouble breathing    © 2017 [address] [place] [address] Street is for End User's use only and may not be sold, redistributed or otherwise used for commercial purposes  All illustrations and images included in CareNotes® are the copyrighted property of A D A M [employer]  or Mitchel Anthony  The above information is an  only  It is not intended as medical advice for individual conditions or treatments   Talk to your doctor, nurse or pharmacist before following any medical regimen to see if it is safe and effective for you  Moderate Sedation   WHAT YOU NEED TO KNOW:   Moderate sedation, or conscious sedation, is medicine used during procedures to help you feel relaxed and calm  You will be awake and able to follow directions without anxiety or pain  You will remember little to none of the procedure  You may feel tired, weak, or unsteady on your feet after you get sedation  You may also have trouble concentrating or short-term memory loss  These symptoms should go away in 24 hours or less  DISCHARGE INSTRUCTIONS:   Call 911 or have someone else call for any of the following:   · You have sudden trouble breathing  · You cannot be woken  Seek care immediately if:   · You have a severe headache or dizziness  · Your heart is beating faster than usual   Contact your healthcare provider if:   · You have a fever  · You have nausea or are vomiting for more than 8 hours after the procedure  · Your skin is itchy, swollen, or you have a rash  · You have questions or concerns about your condition or care  Self-care:   · Have someone stay with you for 24 hours  This person can drive you to errands and help you do things around the house  This person can also watch for problems  · Rest and do quiet activities for 24 hours  Do not exercise, ride a bike, or play sports  Stand up slowly to prevent dizziness and falls  Take short walks around the house with another person  Slowly return to your usual activities the next day  · Do not drive or use dangerous machines or tools for 24 hours  You may injure yourself or others  Examples include a lawnmower, saw, or drill  Do not return to work for 24 hours if you use dangerous machines or tools for work  · Do not make important decisions for 24 hours  For example, do not sign important papers or invest money  · Drink liquids as directed  Liquids help flush the sedation medicine out of your body   Ask how much liquid to drink each day and which liquids are best for you  · Eat small, frequent meals to prevent nausea and vomiting  Start with clear liquids such as juice or broth  If you do not vomit after clear liquids, you can eat your usual foods  · Do not drink alcohol or take medicines that make you drowsy  This includes medicines that help you sleep and anxiety medicines  Ask your healthcare provider if it is safe for you to take pain medicine  Follow up with your healthcare provider as directed:  Write down your questions so you remember to ask them during your visits  © 2017 [address] Information is for End User's use only and may not be sold, redistributed or otherwise used for commercial purposes  All illustrations and images included in CareNotes® are the copyrighted property of [employer]  or Mitchel Anthony  The above information is an  only  It is not intended as medical advice for individual conditions or treatments  Talk to your doctor, nurse or pharmacist before following any medical regimen to see if it is safe and effective for you

## 2018-08-27 NOTE — ANESTHESIA PREPROCEDURE EVALUATION
Review of Systems/Medical History  Patient summary reviewed        Cardiovascular  Hypertension , CAD , CHF ,   Comment: Cath  showed normal vessels    Transthoracic Echocardiogram  2D, M-mode, Doppler, and Color Doppler     Study date:  14-Aug-2018     Patient: Ravi Montoya  MR number: [id number]  Account number: [de-identified]  : 1940  Age: 65 years  Gender: Female  Status: Inpatient  Location: Bedside  Height: 57 in  Weight: 224 lb  BP: 139/ 60 mmHg     Indications: Dyspnea, shortness of breath     Diagnoses: R06 00 - Dyspnea, unspecified     Sonographer:  Sudeep Callahan RDCS  Interpreting Physician:  Nadir Campoverde MD  Referring Physician:  Marcial Reese PA-C  Group:  [facility]     SUMMARY     LEFT VENTRICLE:  Systolic function was normal  Ejection fraction was estimated in the range of 55 % to 65 %  There were no regional wall motion abnormalities  There was mild concentric hypertrophy  Doppler parameters were consistent with abnormal left ventricular relaxation (grade 1 diastolic dysfunction)      LEFT ATRIUM:  The atrium was mildly dilated      MITRAL VALVE:  There was mild annular calcification      AORTIC VALVE:  The valve was not visualized well enough to rule out a bicuspid morphology  Leaflets exhibited marked calcification and markedly reduced cuspal separation  Transaortic velocity was increased due to valvular stenosis  There was moderate to severe stenosis  There was mild regurgitation      TRICUSPID VALVE:  There was mild regurgitation  Estimated peak PA pressure was 40 mmHg      HISTORY: PRIOR HISTORY: Risk factors: CAD, hypertension, hypercholesterolemia, JANICE and morbid obesity      PROCEDURE: The procedure was performed at the bedside  This was a routine study  The transthoracic approach was used  The study included complete 2D imaging, M-mode, complete spectral Doppler, and color Doppler  The heart rate was 66 bpm,  at the start of the study  Images were obtained from the parasternal, apical, subcostal, and suprasternal notch acoustic windows  Echocardiographic views were limited due to decreased penetration and lung interference  Image quality was  adequate      LEFT VENTRICLE: Size was normal  Systolic function was normal  Ejection fraction was estimated in the range of 55 % to 65 %  There were no regional wall motion abnormalities  There was mild concentric hypertrophy  DOPPLER: Doppler  parameters were consistent with abnormal left ventricular relaxation (grade 1 diastolic dysfunction)      RIGHT VENTRICLE: The size was normal  Systolic function was normal  Wall thickness was normal      LEFT ATRIUM: The atrium was mildly dilated      RIGHT ATRIUM: Size was normal      MITRAL VALVE: There was mild annular calcification  Valve structure was normal  There was normal leaflet separation  DOPPLER: The transmitral velocity was within the normal range  There was no evidence for stenosis  There was no  regurgitation      AORTIC VALVE: The valve was not visualized well enough to rule out a bicuspid morphology  Leaflets exhibited marked calcification and markedly reduced cuspal separation  DOPPLER: Transaortic velocity was increased due to valvular stenosis  There was moderate to severe stenosis  There was mild regurgitation      TRICUSPID VALVE: The valve structure was normal  There was normal leaflet separation  DOPPLER: The transtricuspid velocity was within the normal range  There was no evidence for stenosis  There was mild regurgitation  Estimated peak PA  pressure was 40 mmHg      PULMONIC VALVE: Leaflets exhibited normal thickness, no calcification, and normal cuspal separation  DOPPLER: The transpulmonic velocity was within the normal range  There was no regurgitation      PERICARDIUM: There was no pericardial effusion   The pericardium was normal in appearance      AORTA: The root exhibited normal size , Pulmonary hypertension Pulmonary  Pneumonia, Shortness of breath, Sleep apnea ,        GI/Hepatic    GERD ,             Endo/Other  History of thyroid disease ,   Obesity  morbid obesity   GYN       Hematology  Anemia ,     Musculoskeletal    Arthritis     Neurology   Psychology                Anesthesia Plan  ASA Score- 3     Anesthesia Type- IV sedation with anesthesia with ASA Monitors  Additional Monitors:   Airway Plan:         Plan Factors-    Induction- intravenous  Postoperative Plan-     Informed Consent- Anesthetic plan and risks discussed with patient

## 2018-08-27 NOTE — ANESTHESIA POSTPROCEDURE EVALUATION
Post-Op Assessment Note      CV Status:  Stable    Mental Status:  Alert and awake    Hydration Status:  Euvolemic    PONV Controlled:  Controlled    Airway Patency:  Patent    Post Op Vitals Reviewed: Yes          Staff: CRNA       Comments: remained on o2 via nc because patient on home o2          BP   119/58   Temp      Pulse     Resp      SpO2   100

## 2018-08-28 ENCOUNTER — OFFICE VISIT (OUTPATIENT)
Dept: GASTROENTEROLOGY | Facility: CLINIC | Age: 78
End: 2018-08-28
Payer: MEDICARE

## 2018-08-28 VITALS
BODY MASS INDEX: 50.96 KG/M2 | WEIGHT: 219.25 LBS | HEART RATE: 67 BPM | DIASTOLIC BLOOD PRESSURE: 70 MMHG | SYSTOLIC BLOOD PRESSURE: 136 MMHG

## 2018-08-28 DIAGNOSIS — D64.9 ANEMIA, UNSPECIFIED TYPE: Primary | ICD-10-CM

## 2018-08-28 DIAGNOSIS — K62.5 RECTAL BLEEDING: ICD-10-CM

## 2018-08-28 DIAGNOSIS — K21.9 GASTROESOPHAGEAL REFLUX DISEASE WITHOUT ESOPHAGITIS: Chronic | ICD-10-CM

## 2018-08-28 PROCEDURE — 99214 OFFICE O/P EST MOD 30 MIN: CPT | Performed by: INTERNAL MEDICINE

## 2018-08-28 NOTE — PROGRESS NOTES
Follow-up Note -  [facility]  Nikki Leonard 1940 65 y o  female     ASSESSMENT @ PLAN:   She is a 11-year-old female with severe aortic stenosis who is going for aortic valve surgery and probable anticoagulation who has a stool Hemoccult positive microcytic iron deficiency anemia  She had a normal colonoscopy 5 years ago and she is reporting epigastric abdominal pain and has known reflux    1 will do EGD to investigate    2 she will continue on her omeprazole    3 if the endoscopy is negative she will prior a colonoscopy and video capsule endoscopy    Reason:   Stool Hemoccult positive iron deficiency anemia    HPI:   She is a 69-year-old female with multiple medical problems  She has aortic valve stenosis  She is going for a valve surgery  She was found in the preoperative testing to have an iron deficiency anemia and her stool Hemoccult test was positive  The patient had a normal colonoscopy 5 years ago  She has never had endoscopy  She does not have overt blood loss  She has no melena or bleeding  She has no nausea vomiting she has no diarrhea no constipation she has no heartburn  She has an uncomfortable sensation when she eats in the epigastrium  She has never had an ulcer  She is going for valve surgery  REVIEW OF SYSTEMS:     CONSTITUTIONAL: Denies any fever, chills, or rigors  Good appetite, and no recent weight loss  HEENT: No earache or tinnitus  Denies hearing loss or visual disturbances  CARDIOVASCULAR: No chest pain or palpitations  RESPIRATORY: Denies any cough, hemoptysis, shortness of breath or dyspnea on exertion  GASTROINTESTINAL: As noted in the History of Present Illness  GENITOURINARY: No problems with urination  Denies any hematuria or dysuria  NEUROLOGIC: No dizziness or vertigo, denies headaches  MUSCULOSKELETAL: Denies any muscle or joint pain  SKIN: Denies skin rashes or itching     ENDOCRINE: Denies excessive thirst  Denies intolerance to heat or cold  PSYCHOSOCIAL: Denies depression or anxiety  Denies any recent memory loss  Past Medical History:   Diagnosis Date    Anemia 08/22/2018    Aortic valve disease     Arthritis     Cardiac disease     "leaky valve"    CHF (congestive heart failure) (HCC)     Coronary artery disease     Disease of thyroid gland     Dislocation of right shoulder joint     Hyperlipidemia     Hypertension     Hypothyroid 08/22/2018    Murmur, cardiac     Obesity, morbid ([facility] [address] ) 08/22/2018    Pulmonary hypertension ([facility] [address] ) 08/22/2018    Simple goiter     Skin cyst     within the armpits, right      Past Surgical History:   Procedure Laterality Date    BREAST BIOPSY      CARPAL TUNNEL RELEASE      CHOLECYSTECTOMY      DILATION AND CURETTAGE OF UTERUS      HYSTEROSCOPY      JOINT REPLACEMENT      b/l knee     JOINT REPLACEMENT  2007    left hip     Social History     Social History    Marital status: Single     Spouse name: N/A    Number of children: N/A    Years of education: N/A     Occupational History    Not on file       Social History Main Topics    Smoking status: Never Smoker    Smokeless tobacco: Never Used    Alcohol use No    Drug use: No    Sexual activity: No     Other Topics Concern    Not on file     Social History Narrative    Denied drinking coffee    Denied exercise habits         Family History   Problem Relation Age of Onset    Diabetes Mother     Stroke Mother     Cancer Father     Lung cancer Father     Diabetes Sister     Heart disease Sister     Coronary artery disease Family     Diabetes Family     Hypertension Family     Cancer Family     Stroke Family      Latex and Neosporin [neomycin-bacitracin zn-polymyx]  Current Outpatient Prescriptions   Medication Sig Dispense Refill    albuterol (2 5 mg/3 mL) 0 083 % nebulizer solution Take 3 mL by nebulization every 6 (six) hours as needed for wheezing or shortness of breath 75 mL 0    aspirin (ECOTRIN LOW STRENGTH) 81 mg EC tablet Take 1 tablet by mouth daily 30 tablet 0    b complex vitamins capsule Take 1 capsule by mouth 2 (two) times a day        calcium carbonate (OS-SYLVESTER) 600 MG tablet Take 1 tablet by mouth 2 (two) times a day        Fesoterodine Fumarate ER (TOVIAZ) 8 MG TB24 Take 8 mg by mouth daily      fluticasone (FLONASE) 50 mcg/act nasal spray 1 spray into each nostril daily      furosemide (LASIX) 40 mg tablet Take 1 tablet (40 mg total) by mouth daily 30 tablet 0    levothyroxine 50 mcg tablet Take 50 mcg by mouth daily      loratadine (CLARITIN) 10 mg tablet Take 10 mg by mouth daily      losartan (COZAAR) 50 mg tablet Take 0 5 tablets (25 mg total) by mouth daily 30 tablet 3    omeprazole (PriLOSEC) 40 MG capsule Take 40 mg by mouth 2 (two) times a day  1    sertraline (ZOLOFT) 50 mg tablet Take 50 mg by mouth daily      simvastatin (ZOCOR) 40 mg tablet Take 40 mg by mouth daily at bedtime      temazepam (RESTORIL) 15 mg capsule Take 15 mg by mouth daily  1     No current facility-administered medications for this visit  Blood pressure 136/70, pulse 67, weight 99 5 kg (219 lb 4 oz)  PHYSICAL EXAM:     General Appearance:   Alert, cooperative, no distress, appears stated age    HEENT:   Normocephalic, atraumatic, anicteric      Neck:  Supple, symmetrical, trachea midline, no adenopathy;    thyroid: no enlargement/tenderness/nodules; no carotid  bruit or JVD    Lungs:   Clear to auscultation bilaterally; no rales, rhonchi or wheezing; respirations unlabored    Heart[de-identified]   S1 and S2 normal; regular rate and rhythm; no murmur, rub, or gallop     Abdomen:   Soft, non-tender, non-distended; normal bowel sounds; no masses, no organomegaly    Genitalia:   Deferred    Rectal:   Deferred    Extremities:  No cyanosis, clubbing or edema    Pulses:  2+ and symmetric all extremities    Skin:  Skin color, texture, turgor normal, no rashes or lesions    Lymph nodes:  No palpable cervical, axillary or inguinal lymphadenopathy        Lab Results   Component Value Date    WBC 8 75 08/15/2018    HGB 9 8 (L) 08/15/2018    HCT 33 2 (L) 08/15/2018    MCV 79 (L) 08/15/2018     (H) 08/15/2018     Lab Results   Component Value Date    CALCIUM 9 6 08/15/2018     08/15/2018    K 4 3 08/15/2018    CO2 31 08/15/2018     08/15/2018    BUN 25 08/15/2018    CREATININE 0 83 08/15/2018     Lab Results   Component Value Date    ALT 18 08/13/2018    AST 18 08/13/2018    ALKPHOS 86 08/13/2018     Lab Results   Component Value Date    INR 0 98 12/02/2017    PROTIME 13 2 12/02/2017

## 2018-08-29 ENCOUNTER — TELEPHONE (OUTPATIENT)
Dept: NON INVASIVE DIAGNOSTICS | Facility: HOSPITAL | Age: 78
End: 2018-08-29

## 2018-08-29 ENCOUNTER — HOSPITAL ENCOUNTER (OUTPATIENT)
Dept: ULTRASOUND IMAGING | Facility: HOSPITAL | Age: 78
Discharge: HOME | End: 2018-08-29
Payer: MEDICARE

## 2018-08-29 ENCOUNTER — HOSPITAL ENCOUNTER (OUTPATIENT)
Dept: PULMONOLOGY | Facility: HOSPITAL | Age: 78
Discharge: HOME | End: 2018-08-29
Payer: MEDICARE

## 2018-08-29 ENCOUNTER — APPOINTMENT (OUTPATIENT)
Dept: LAB | Facility: HOSPITAL | Age: 78
End: 2018-08-29
Payer: MEDICARE

## 2018-08-29 ENCOUNTER — HOSPITAL ENCOUNTER (OUTPATIENT)
Dept: CT IMAGING | Facility: HOSPITAL | Age: 78
Discharge: HOME | End: 2018-08-29
Payer: MEDICARE

## 2018-08-29 DIAGNOSIS — I35.0 SEVERE AORTIC STENOSIS: ICD-10-CM

## 2018-08-29 DIAGNOSIS — I10 ESSENTIAL HYPERTENSION: ICD-10-CM

## 2018-08-29 LAB
ALBUMIN SERPL BCP-MCNC: 3.5 G/DL (ref 3.5–5)
ANION GAP SERPL CALCULATED.3IONS-SCNC: 8 MMOL/L (ref 4–13)
BUN SERPL-MCNC: 24 MG/DL (ref 5–25)
CALCIUM ALBUM COR SERPL-MCNC: 10.8 MG/DL (ref 8.3–10.1)
CALCIUM SERPL-MCNC: 10.4 MG/DL (ref 8.3–10.1)
CALCIUM SERPL-MCNC: 10.4 MG/DL (ref 8.3–10.1)
CHLORIDE SERPL-SCNC: 101 MMOL/L (ref 100–108)
CO2 SERPL-SCNC: 34 MMOL/L (ref 21–32)
CREAT SERPL-MCNC: 0.9 MG/DL (ref 0.6–1.3)
GFR SERPL CREATININE-BSD FRML MDRD: 61 ML/MIN/1.73SQ M
GLUCOSE P FAST SERPL-MCNC: 98 MG/DL (ref 65–99)
POTASSIUM SERPL-SCNC: 3.6 MMOL/L (ref 3.5–5.3)
SODIUM SERPL-SCNC: 143 MMOL/L (ref 136–145)

## 2018-08-29 PROCEDURE — 93880 EXTRACRANIAL BILAT STUDY: CPT | Performed by: SURGERY

## 2018-08-29 PROCEDURE — 94060 EVALUATION OF WHEEZING: CPT | Performed by: INTERNAL MEDICINE

## 2018-08-29 PROCEDURE — 82040 ASSAY OF SERUM ALBUMIN: CPT

## 2018-08-29 PROCEDURE — 94729 DIFFUSING CAPACITY: CPT

## 2018-08-29 PROCEDURE — 93880 EXTRACRANIAL BILAT STUDY: CPT

## 2018-08-29 PROCEDURE — 75572 CT HRT W/3D IMAGE: CPT

## 2018-08-29 PROCEDURE — 94726 PLETHYSMOGRAPHY LUNG VOLUMES: CPT | Performed by: INTERNAL MEDICINE

## 2018-08-29 PROCEDURE — 74174 CTA ABD&PLVS W/CONTRAST: CPT

## 2018-08-29 PROCEDURE — 94729 DIFFUSING CAPACITY: CPT | Performed by: INTERNAL MEDICINE

## 2018-08-29 PROCEDURE — 94060 EVALUATION OF WHEEZING: CPT

## 2018-08-29 PROCEDURE — 94727 GAS DIL/WSHOT DETER LNG VOL: CPT

## 2018-08-29 PROCEDURE — 36415 COLL VENOUS BLD VENIPUNCTURE: CPT

## 2018-08-29 PROCEDURE — 80048 BASIC METABOLIC PNL TOTAL CA: CPT

## 2018-08-29 PROCEDURE — 94760 N-INVAS EAR/PLS OXIMETRY 1: CPT

## 2018-08-29 RX ORDER — ALBUTEROL SULFATE 2.5 MG/3ML
2.5 SOLUTION RESPIRATORY (INHALATION) ONCE
Status: COMPLETED | OUTPATIENT
Start: 2018-08-29 | End: 2018-08-29

## 2018-08-29 RX ADMIN — ALBUTEROL SULFATE 2.5 MG: 2.5 SOLUTION RESPIRATORY (INHALATION) at 10:52

## 2018-08-29 RX ADMIN — IODIXANOL 140 ML: 320 INJECTION, SOLUTION INTRAVASCULAR at 11:49

## 2018-08-30 DIAGNOSIS — E83.52 HYPERCALCEMIA: Primary | ICD-10-CM

## 2018-08-31 ENCOUNTER — HOSPITAL ENCOUNTER (OUTPATIENT)
Facility: HOSPITAL | Age: 78
Setting detail: OUTPATIENT SURGERY
Discharge: HOME | End: 2018-08-31
Attending: INTERNAL MEDICINE | Admitting: INTERNAL MEDICINE
Payer: MEDICARE

## 2018-08-31 ENCOUNTER — ANESTHESIA EVENT (OUTPATIENT)
Dept: PERIOP | Facility: HOSPITAL | Age: 78
End: 2018-08-31
Payer: MEDICARE

## 2018-08-31 ENCOUNTER — ANESTHESIA (OUTPATIENT)
Dept: PERIOP | Facility: HOSPITAL | Age: 78
End: 2018-08-31
Payer: MEDICARE

## 2018-08-31 VITALS
DIASTOLIC BLOOD PRESSURE: 65 MMHG | WEIGHT: 218.7 LBS | SYSTOLIC BLOOD PRESSURE: 143 MMHG | OXYGEN SATURATION: 95 % | TEMPERATURE: 98.1 F | BODY MASS INDEX: 50.61 KG/M2 | HEIGHT: 55 IN | HEART RATE: 59 BPM | RESPIRATION RATE: 24 BRPM

## 2018-08-31 DIAGNOSIS — K59.01 SLOW TRANSIT CONSTIPATION: Primary | ICD-10-CM

## 2018-08-31 DIAGNOSIS — K21.9 GASTROESOPHAGEAL REFLUX DISEASE WITHOUT ESOPHAGITIS: ICD-10-CM

## 2018-08-31 DIAGNOSIS — D64.9 ANEMIA, UNSPECIFIED TYPE: ICD-10-CM

## 2018-08-31 PROCEDURE — 43239 EGD BIOPSY SINGLE/MULTIPLE: CPT | Performed by: INTERNAL MEDICINE

## 2018-08-31 PROCEDURE — 88305 TISSUE EXAM BY PATHOLOGIST: CPT | Performed by: PATHOLOGY

## 2018-08-31 PROCEDURE — 88342 IMHCHEM/IMCYTCHM 1ST ANTB: CPT | Performed by: PATHOLOGY

## 2018-08-31 RX ORDER — SODIUM CHLORIDE, SODIUM LACTATE, POTASSIUM CHLORIDE, CALCIUM CHLORIDE 600; 310; 30; 20 MG/100ML; MG/100ML; MG/100ML; MG/100ML
INJECTION, SOLUTION INTRAVENOUS CONTINUOUS PRN
Status: DISCONTINUED | OUTPATIENT
Start: 2018-08-31 | End: 2018-08-31 | Stop reason: SURG

## 2018-08-31 RX ORDER — LIDOCAINE HYDROCHLORIDE 10 MG/ML
INJECTION, SOLUTION INFILTRATION; PERINEURAL AS NEEDED
Status: DISCONTINUED | OUTPATIENT
Start: 2018-08-31 | End: 2018-08-31 | Stop reason: SURG

## 2018-08-31 RX ORDER — PROPOFOL 10 MG/ML
INJECTION, EMULSION INTRAVENOUS AS NEEDED
Status: DISCONTINUED | OUTPATIENT
Start: 2018-08-31 | End: 2018-08-31 | Stop reason: SURG

## 2018-08-31 RX ORDER — POLYETHYLENE GLYCOL 3350, SODIUM CHLORIDE, SODIUM BICARBONATE, POTASSIUM CHLORIDE 420; 11.2; 5.72; 1.48 G/4L; G/4L; G/4L; G/4L
4000 POWDER, FOR SOLUTION ORAL ONCE
Qty: 4000 ML | Refills: 0 | Status: SHIPPED | OUTPATIENT
Start: 2018-08-31 | End: 2018-10-11 | Stop reason: HOSPADM

## 2018-08-31 RX ADMIN — SODIUM CHLORIDE, SODIUM LACTATE, POTASSIUM CHLORIDE, AND CALCIUM CHLORIDE: .6; .31; .03; .02 INJECTION, SOLUTION INTRAVENOUS at 08:50

## 2018-08-31 RX ADMIN — PROPOFOL 80 MG: 10 INJECTION, EMULSION INTRAVENOUS at 09:10

## 2018-08-31 RX ADMIN — LIDOCAINE HYDROCHLORIDE 50 MG: 10 INJECTION, SOLUTION INFILTRATION; PERINEURAL at 09:10

## 2018-08-31 NOTE — OP NOTE
ESOPHAGOGASTRODUODENOSCOPY    PROCEDURE: EGD/ Biopsy    INDICATIONS: Occult GI Bleeding    POST-OP DIAGNOSIS: See the impression below    SEDATION: Monitored anesthesia care, check anesthesia records    PHYSICAL EXAM:  Vitals:    08/31/18 0833   BP: 162/69   Pulse: 65   Resp: 21   Temp: 97 9 °F (36 6 °C)   SpO2: 95%     Body mass index is 50 83 kg/m²  General: NAD  Heart: S1 & S2 normal, RRR  Lungs: CTA, No rales or rhonchi  Abdomen: Soft, nontender, nondistended, good bowel sounds    CONSENT:  Informed consent was obtained for the procedure, including sedation after explaining the risks and benefits of the procedure  Risks including but not limited to bleeding, perforation, infection, aspiration were discussed in detail  Also explained about less than 100% sensitivity with the exam and other alternatives  PREPARATION:   EKG tracing, pulse oximetry, blood pressure were monitored throughout the procedure  Patient was identified by myself both verbally and by visual inspection of ID band  DESCRIPTION:   Patient was placed in the left lateral decubitus position and was sedated with the above medication  The gastroscope was introduced in to the oropharynx and the esophagus was intubated under direct visualization  Scope was passed down the esophagus up to 2nd part of the duodenum  A careful inspection was made as the gastroscope was withdrawn, including a retroflexed view of the stomach; findings and interventions are described below  FINDINGS:    #1  Esophagus and GEJ- there was LA grade A distal reflux esophagitis present  #2  Stomach- the cardia body fundus and antrum appeared normal   Multiple biopsies were obtained  Scattered fundal gland polyps were noted    #3   Duodenum- the bulb and 2nd portion of the duodenum appeared normal   Multiple biopsies were obtained to rule out celiac disease         IMPRESSIONS:    LA grade a distal reflux esophagitis  No source of upper GI bleeding found    RECOMMENDATIONS:   Await pathology  Ppi  Weight loss and reflux precautions  Will plan colonoscopy and likely video capsule endoscopy    COMPLICATIONS:  None; patient tolerated the procedure well    DISPOSITION: PACU  CONDITION: Stable    Sofia Sorensen MD  8/31/2018,9:18 AM

## 2018-08-31 NOTE — DISCHARGE INSTRUCTIONS
IMPRESSIONS:    LA grade a distal reflux esophagitis  No source of upper GI bleeding found     RECOMMENDATIONS:   Await pathology  Ppi  Weight loss and reflux precautions  Will plan colonoscopy and likely video capsule endoscopy    Upper Endoscopy   WHAT YOU NEED TO KNOW:   An upper endoscopy is also called an upper gastrointestinal (GI) endoscopy, or an esophagogastroduodenoscopy (EGD)  You may feel bloated, gassy, or have some abdominal discomfort after your procedure  Your throat may be sore for 24 to 36 hours  You may burp or pass gas from air that is still inside your body  DISCHARGE INSTRUCTIONS:   Call 911 for any of the following:   · You have sudden chest pain or trouble breathing  Seek care immediately if:   · You feel dizzy or faint  · You have trouble swallowing  · Your bowel movements are very dark or black  · Your abdomen is hard and firm and you have severe pain  · You vomit blood  Contact your healthcare provider if:   · You feel full or bloated and cannot burp or pass gas  · You have not had a bowel movement for 3 days after your procedure  · You have neck pain  · You have a fever or chills  · You have nausea or are vomiting  · You have a rash or hives  · You have questions or concerns about your endoscopy  Relieve a sore throat:  Suck on throat lozenges or crushed ice  Gargle with a small amount of warm salt water  Mix 1 teaspoon of salt and 1 cup of warm water to make salt water  Relieve gas and discomfort from bloating:  Lie on your right side with a heating pad on your abdomen  Take short walks to help pass gas  Eat small meals until bloating is relieved  Rest after your procedure: You have been given medicine to relax you  Do not  drive or make important decisions until the day after your procedure  Return to your normal activity as directed  You can usually return to work the day after your procedure    Follow up with your healthcare provider as directed: Write down your questions so you remember to ask them during your visits  © 2017 [employer] is for End User's use only and may not be sold, redistributed or otherwise used for commercial purposes  All illustrations and images included in CareNotes® are the copyrighted property of [employer]  or [employer]  The above information is an  only  It is not intended as medical advice for individual conditions or treatments  Talk to your doctor, nurse or pharmacist before following any medical regimen to see if it is safe and effective for you

## 2018-08-31 NOTE — TELEPHONE ENCOUNTER
BOOKED 9/6/18          ----- Message from Mahogany Chapman MD sent at 8/31/2018  9:21 AM EDT -----   Put her on for colonoscopy on Thursday next week with golytely prep at [facility] only #

## 2018-08-31 NOTE — ANESTHESIA POSTPROCEDURE EVALUATION
Post-Op Assessment Note      CV Status:  Stable    Mental Status:  Alert and awake    Hydration Status:  Euvolemic    PONV Controlled:  Controlled    Airway Patency:  Patent    Post Op Vitals Reviewed: Yes          Staff: Anesthesiologist, CRNA       Comments: VSS          BP      Temp      Pulse     Resp      SpO2

## 2018-09-04 ENCOUNTER — APPOINTMENT (OUTPATIENT)
Dept: LAB | Facility: HOSPITAL | Age: 78
End: 2018-09-04
Payer: MEDICARE

## 2018-09-04 DIAGNOSIS — E83.52 HYPERCALCEMIA: ICD-10-CM

## 2018-09-04 LAB — PTH-INTACT SERPL-MCNC: 121.9 PG/ML (ref 18.4–80.1)

## 2018-09-04 PROCEDURE — 83970 ASSAY OF PARATHORMONE: CPT

## 2018-09-04 PROCEDURE — 36415 COLL VENOUS BLD VENIPUNCTURE: CPT

## 2018-09-05 ENCOUNTER — ANESTHESIA EVENT (OUTPATIENT)
Dept: PERIOP | Facility: HOSPITAL | Age: 78
End: 2018-09-05
Payer: MEDICARE

## 2018-09-05 RX ORDER — SODIUM CHLORIDE, SODIUM LACTATE, POTASSIUM CHLORIDE, CALCIUM CHLORIDE 600; 310; 30; 20 MG/100ML; MG/100ML; MG/100ML; MG/100ML
20 INJECTION, SOLUTION INTRAVENOUS CONTINUOUS
Status: CANCELLED | OUTPATIENT
Start: 2018-09-05

## 2018-09-06 ENCOUNTER — ANESTHESIA (OUTPATIENT)
Dept: PERIOP | Facility: HOSPITAL | Age: 78
End: 2018-09-06
Payer: MEDICARE

## 2018-09-06 ENCOUNTER — HOSPITAL ENCOUNTER (OUTPATIENT)
Facility: HOSPITAL | Age: 78
Setting detail: OUTPATIENT SURGERY
Discharge: HOME | End: 2018-09-06
Attending: INTERNAL MEDICINE | Admitting: INTERNAL MEDICINE
Payer: MEDICARE

## 2018-09-06 VITALS
SYSTOLIC BLOOD PRESSURE: 129 MMHG | BODY MASS INDEX: 50.1 KG/M2 | TEMPERATURE: 97.9 F | HEIGHT: 55 IN | RESPIRATION RATE: 20 BRPM | HEART RATE: 61 BPM | WEIGHT: 216.49 LBS | DIASTOLIC BLOOD PRESSURE: 61 MMHG | OXYGEN SATURATION: 99 %

## 2018-09-06 DIAGNOSIS — E83.52 HYPERCALCEMIA: Primary | ICD-10-CM

## 2018-09-06 DIAGNOSIS — R79.89 ELEVATED PTHRP LEVEL: ICD-10-CM

## 2018-09-06 PROCEDURE — 45378 DIAGNOSTIC COLONOSCOPY: CPT | Performed by: INTERNAL MEDICINE

## 2018-09-06 RX ORDER — SODIUM CHLORIDE, SODIUM LACTATE, POTASSIUM CHLORIDE, CALCIUM CHLORIDE 600; 310; 30; 20 MG/100ML; MG/100ML; MG/100ML; MG/100ML
125 INJECTION, SOLUTION INTRAVENOUS CONTINUOUS
Status: DISCONTINUED | OUTPATIENT
Start: 2018-09-06 | End: 2018-09-06 | Stop reason: HOSPADM

## 2018-09-06 RX ORDER — PROPOFOL 10 MG/ML
INJECTION, EMULSION INTRAVENOUS AS NEEDED
Status: DISCONTINUED | OUTPATIENT
Start: 2018-09-06 | End: 2018-09-06 | Stop reason: SURG

## 2018-09-06 RX ORDER — LIDOCAINE HYDROCHLORIDE 10 MG/ML
INJECTION, SOLUTION INFILTRATION; PERINEURAL AS NEEDED
Status: DISCONTINUED | OUTPATIENT
Start: 2018-09-06 | End: 2018-09-06 | Stop reason: SURG

## 2018-09-06 RX ADMIN — LIDOCAINE HYDROCHLORIDE 50 MG: 10 INJECTION, SOLUTION INFILTRATION; PERINEURAL at 11:41

## 2018-09-06 RX ADMIN — PROPOFOL 20 MG: 10 INJECTION, EMULSION INTRAVENOUS at 11:43

## 2018-09-06 RX ADMIN — PROPOFOL 50 MG: 10 INJECTION, EMULSION INTRAVENOUS at 11:41

## 2018-09-06 RX ADMIN — TOPICAL ANESTHETIC 1 SPRAY: 200 SPRAY DENTAL; PERIODONTAL at 11:36

## 2018-09-06 RX ADMIN — PROPOFOL 20 MG: 10 INJECTION, EMULSION INTRAVENOUS at 11:45

## 2018-09-06 RX ADMIN — SODIUM CHLORIDE, SODIUM LACTATE, POTASSIUM CHLORIDE, AND CALCIUM CHLORIDE 125 ML/HR: .6; .31; .03; .02 INJECTION, SOLUTION INTRAVENOUS at 11:24

## 2018-09-06 NOTE — OP NOTE
COLONOSCOPY    PROCEDURE: Colonoscopy    INDICATIONS: GI Bleeding    POST-OP DIAGNOSIS: See the impression below    SEDATION: Monitored anesthesia care, check anesthesia records    PHYSICAL EXAM:  Vitals:    09/06/18 1102   BP: 149/68   Pulse: 66   Resp: 20   Temp: 98 °F (36 7 °C)   SpO2: 97%     Body mass index is 50 32 kg/m²  General: NAD  Heart: S1 & S2 normal, RRR  Lungs: CTA, No rales or rhonchi  Abdomen: Soft, nontender, nondistended, good bowel sounds    CONSENT:  Informed consent was obtained for the procedure, including sedation after explaining the risks and benefits of the procedure  Risks including but not limited to bleeding, perforation, infection, aspiration were discussed in detail  Also explained about less than 100%$ sensitivity with the exam and other alternatives  PREPARATION:   EKG tracing, pulse oximetry, blood pressure were monitored throughout the procedure  Patient was identified by myself both verbally and by visual inspection of ID band  DESCRIPTION:   Patient was placed in the left lateral decubitus position and was sedated with the above medication  Digital rectal examination was performed  The colonoscope was introduced in to the anal canal and advanced up to cecum, which was identified by the appendiceal orifice and IC valve  A careful inspection was made as the colonoscope was withdrawn, including a retroflexed view of the rectum; findings and interventions are described below  Appropriate photodocumentation was obtained  The quality of the colonic preparation was excellent  FINDINGS:  There was sigmoid and descending colon diverticulosis  The cecum ascending colon hepatic flexure transverse colon and rectum appeared normal         IMPRESSIONS:    Left-sided diverticulosis  No bleeding source    RECOMMENDATIONS:  Will do a video capsule endoscopy  High-fiber diet    COMPLICATIONS:  None; patient tolerated the procedure well    DISPOSITION: PACU  CONDITION: Stable    Mar Dolan MD  9/6/2018,11:52 AM

## 2018-09-06 NOTE — DISCHARGE INSTRUCTIONS
IMPRESSIONS:    Left-sided diverticulosis  No bleeding source     RECOMMENDATIONS:  Will do a video capsule endoscopy  High-fiber diet       Colonoscopy   WHAT YOU NEED TO KNOW:   A colonoscopy is a procedure to examine the inside of your colon (intestine) with a scope  Polyps or tissue growths may have been removed during your colonoscopy  It is normal to feel bloated and to have some abdominal discomfort  You should be passing gas  If you have hemorrhoids or you had polyps removed, you may have a small amount of bleeding  DISCHARGE INSTRUCTIONS:   Seek care immediately if:   · You have a large amount of bright red blood in your bowel movements  · Your abdomen is hard and firm and you have severe pain  · You have sudden trouble breathing  Contact your healthcare provider if:   · You develop a rash or hives  · You have a fever within 24 hours of your procedure  · You have not had a bowel movement for 3 days after your procedure  · You have questions or concerns about your condition or care  Activity:   · Do not lift, strain, or run  for 3 days after your procedure  · Rest after your procedure  You have been given medicine to relax you  Do not  drive or make important decisions until the day after your procedure  Return to your normal activity as directed  · Relieve gas and discomfort from bloating  by lying on your right side with a heating pad on your abdomen  You may need to take short walks to help the gas move out  Eat small meals until bloating is relieved  If you had polyps removed: For 7 days after your procedure:  · Do not  take aspirin  · Do not  go on long car rides  Help prevent constipation:   · Eat a variety of healthy foods  Healthy foods include fruit, vegetables, whole-grain breads, low-fat dairy products, beans, lean meat, and fish  Ask if you need to be on a special diet  Your healthcare provider may recommend that you eat high-fiber foods such as cooked beans  Fiber helps you have regular bowel movements  · Drink liquids as directed  Adults should drink between 9 and 13 eight-ounce cups of liquid every day  Ask what amount is best for you  For most people, good liquids to drink are water, juice, and milk  · Exercise as directed  Talk to your healthcare provider about the best exercise plan for you  Exercise can help prevent constipation, decrease your blood pressure and improve your health  Follow up with your healthcare provider as directed:  Write down your questions so you remember to ask them during your visits  © 2017 [facility] Information is for End User's use only and may not be sold, redistributed or otherwise used for commercial purposes  All illustrations and images included in CareNotes® are the copyrighted property of [employer]  or [employer]chel [employer]  The above information is an  only  It is not intended as medical advice for individual conditions or treatments  Talk to your doctor, nurse or pharmacist before following any medical regimen to see if it is safe and effective for you

## 2018-09-06 NOTE — ANESTHESIA POSTPROCEDURE EVALUATION
Post-Op Assessment Note      CV Status:  Stable    Mental Status:  Alert and awake    Hydration Status:  Euvolemic    PONV Controlled:  Controlled    Airway Patency:  Patent    Post Op Vitals Reviewed: Yes          Staff: CRNA           BP   138/64   Temp      Pulse  61   Resp   18   SpO2   100

## 2018-09-06 NOTE — ANESTHESIA PREPROCEDURE EVALUATION
Review of Systems/Medical History  Patient summary reviewed  Chart reviewed  No history of anesthetic complications     Cardiovascular  EKG reviewed, Hyperlipidemia, Hypertension , Valvular heart disease , aortic valve stenosis and aortic insufficiency, CAD , CHF ,   Comment: LEFT VENTRICLE: Size was normal  Systolic function was normal  Ejection fraction was estimated in the range of 55 % to 65 %  There were no regional wall motion abnormalities  There was mild concentric hypertrophy  DOPPLER: Doppler  parameters were consistent with abnormal left ventricular relaxation (grade 1 diastolic dysfunction)      RIGHT VENTRICLE: The size was normal  Systolic function was normal  Wall thickness was normal      LEFT ATRIUM: The atrium was mildly dilated      RIGHT ATRIUM: Size was normal      MITRAL VALVE: There was mild annular calcification  Valve structure was normal  There was normal leaflet separation  DOPPLER: The transmitral velocity was within the normal range  There was no evidence for stenosis  There was no  regurgitation      AORTIC VALVE: The valve was not visualized well enough to rule out a bicuspid morphology  Leaflets exhibited marked calcification and markedly reduced cuspal separation  DOPPLER: Transaortic velocity was increased due to valvular stenosis  There was moderate to severe stenosis  There was mild regurgitation      TRICUSPID VALVE: The valve structure was normal  There was normal leaflet separation  DOPPLER: The transtricuspid velocity was within the normal range  There was no evidence for stenosis  There was mild regurgitation  Estimated peak PA  pressure was 40 mmHg      PULMONIC VALVE: Leaflets exhibited normal thickness, no calcification, and normal cuspal separation  DOPPLER: The transpulmonic velocity was within the normal range  There was no regurgitation      PERICARDIUM: There was no pericardial effusion   The pericardium was normal in appearance      AORTA: The root exhibited normal size      SYSTEMIC VEINS: IVC: The inferior vena cava was normal in size and course  Respirophasic changes were normal    , Pulmonary hypertension Pulmonary  No pneumonia, Shortness of breath, Sleep apnea CPAP,        GI/Hepatic    GERD ,             Endo/Other  History of thyroid disease , hypothyroidism,   Obesity  super morbid obesity   GYN       Hematology  Anemia ,     Musculoskeletal    Arthritis     Neurology   Psychology           Physical Exam    Airway    Mallampati score: III  TM Distance: >3 FB  Neck ROM: full     Dental   Comment: Very poor missing several, several chipped,     Cardiovascular  Murmur, Cardiovascular exam normal    Pulmonary  Pulmonary exam normal Breath sounds clear to auscultation,     Other Findings      No current facility-administered medications on file prior to encounter        Current Outpatient Prescriptions on File Prior to Encounter   Medication Sig Dispense Refill    aspirin (ECOTRIN LOW STRENGTH) 81 mg EC tablet Take 1 tablet by mouth daily 30 tablet 0    b complex vitamins capsule Take 1 capsule by mouth 2 (two) times a day        Fesoterodine Fumarate ER (TOVIAZ) 8 MG TB24 Take 8 mg by mouth daily      furosemide (LASIX) 40 mg tablet Take 1 tablet (40 mg total) by mouth daily 30 tablet 0    levothyroxine 50 mcg tablet Take 50 mcg by mouth daily      loratadine (CLARITIN) 10 mg tablet Take 10 mg by mouth daily      losartan (COZAAR) 50 mg tablet Take 0 5 tablets (25 mg total) by mouth daily 30 tablet 3    omeprazole (PriLOSEC) 40 MG capsule Take 40 mg by mouth 2 (two) times a day  1    sertraline (ZOLOFT) 50 mg tablet Take 50 mg by mouth daily      simvastatin (ZOCOR) 40 mg tablet Take 40 mg by mouth daily at bedtime      temazepam (RESTORIL) 15 mg capsule Take 15 mg by mouth daily  1    albuterol (2 5 mg/3 mL) 0 083 % nebulizer solution Take 3 mL by nebulization every 6 (six) hours as needed for wheezing or shortness of breath 75 mL 0    fluticasone (FLONASE) 50 mcg/act nasal spray 1 spray into each nostril daily      polyethylene glycol-electrolytes (NULYTELY) 4000 mL solution Take 4,000 mL by mouth once for 1 dose 4000 mL 0       Lab Results   Component Value Date    WBC 8 75 08/15/2018    HGB 9 8 (L) 08/15/2018     (H) 08/15/2018     Lab Results   Component Value Date     08/29/2018    K 3 6 08/29/2018    BUN 24 08/29/2018    CREATININE 0 90 08/29/2018     Lab Results   Component Value Date    PTT 31 12/02/2017      Lab Results   Component Value Date    INR 0 98 12/02/2017         No results found for: HGBA1C      Anesthesia Plan  ASA Score- 4     Anesthesia Type- IV sedation with anesthesia with ASA Monitors  Additional Monitors:   Airway Plan:     Comment: I, Dr Silverio Tsai, the attending physician, has personally seen and evaluated the patient prior to anesthetic care  I have reviewed the pre-anesthetic record, and other medical records if appropriate to the anesthetic care  Risks and benefits discussed with patient; patient consented and agrees to proceed  If a CRNA is involved in the case, I have reviewed the CRNA assessment, if present, and agree  The patient is in a suitable condition to proceed with my formulated anesthetic plan        Plan Factors- Patient instructed to abstain from smoking on day of procedure       Induction- intravenous  Postoperative Plan-     Informed Consent- Anesthetic plan and risks discussed with patient  I personally reviewed this patient with the CRNA  Discussed and agreed on the Anesthesia Plan with the CRNA             Lab Results   Component Value Date    WBC 8 75 08/15/2018    HGB 9 8 (L) 08/15/2018    HCT 33 2 (L) 08/15/2018    MCV 79 (L) 08/15/2018     (H) 08/15/2018     Lab Results   Component Value Date    CALCIUM 10 4 (H) 08/29/2018     08/29/2018    K 3 6 08/29/2018    CO2 34 (H) 08/29/2018     08/29/2018    BUN 24 08/29/2018    CREATININE 0 90 08/29/2018     Lab Results   Component Value Date    INR 0 98 12/02/2017    PROTIME 13 2 12/02/2017     Lab Results   Component Value Date    PTT 31 12/02/2017

## 2018-09-07 ENCOUNTER — TELEPHONE (OUTPATIENT)
Dept: GASTROENTEROLOGY | Facility: CLINIC | Age: 78
End: 2018-09-07

## 2018-09-12 ENCOUNTER — TELEPHONE (OUTPATIENT)
Dept: FAMILY MEDICINE CLINIC | Facility: CLINIC | Age: 78
End: 2018-09-12

## 2018-09-12 ENCOUNTER — TELEPHONE (OUTPATIENT)
Dept: CARDIOLOGY CLINIC | Facility: CLINIC | Age: 78
End: 2018-09-12

## 2018-09-12 NOTE — TELEPHONE ENCOUNTER
Dr Ulises Medrano office called stating they needed a clearance for the patient to have teeth pulled because of her heart issues    Fabián Brown originally took the call, and then I spoke to the  and advised her that Catherine Merritt is off today and Dr Tayler Puga is here but he has not seen the patient and may not be about to clear her    The  was upset and stated that the pt was in the chair prepped already and that they could not do anything else with out there proper clearance , and they did not want to have her come back after being prepped already    I advised that she should contact the Cardiologist and gave her the number    I called [phone] Lloyd agreed Cardiac Clearance needs to come from the Cardiologist    I called the office back and informed them

## 2018-09-12 NOTE — TELEPHONE ENCOUNTER
PT IS CURRENTLY AT THE DENTIST (DR GROSS'S OFFICE) WAITING TO HAVE 3 TEETH EXTRACTED   SETH WOULD LIKE DR MONTOYA TO FAX OVER CLEARANCE [phone]

## 2018-09-12 NOTE — TELEPHONE ENCOUNTER
Dr Travis Masterson was informed by Xiang Pfeiffer about this when the message was sent, he's currently in a room   I printed this message and left it with Andreina Lam so she can bring it to his attn when he comes out

## 2018-09-13 ENCOUNTER — TELEPHONE (OUTPATIENT)
Dept: GASTROENTEROLOGY | Facility: CLINIC | Age: 78
End: 2018-09-13

## 2018-09-13 ENCOUNTER — OFFICE VISIT (OUTPATIENT)
Dept: GASTROENTEROLOGY | Facility: CLINIC | Age: 78
End: 2018-09-13
Payer: MEDICARE

## 2018-09-13 DIAGNOSIS — D50.9 IRON DEFICIENCY ANEMIA, UNSPECIFIED IRON DEFICIENCY ANEMIA TYPE: ICD-10-CM

## 2018-09-17 ENCOUNTER — TELEPHONE (OUTPATIENT)
Dept: FAMILY MEDICINE CLINIC | Facility: CLINIC | Age: 78
End: 2018-09-17

## 2018-09-17 PROCEDURE — 91110 GI TRC IMG INTRAL ESOPH-ILE: CPT | Performed by: INTERNAL MEDICINE

## 2018-09-18 DIAGNOSIS — R06.00 DYSPNEA ON EXERTION: ICD-10-CM

## 2018-09-18 RX ORDER — FUROSEMIDE 40 MG/1
40 TABLET ORAL DAILY
Qty: 30 TABLET | Refills: 0 | Status: SHIPPED | OUTPATIENT
Start: 2018-09-18 | End: 2018-09-26 | Stop reason: SDUPTHER

## 2018-09-19 ENCOUNTER — TELEPHONE (OUTPATIENT)
Dept: CARDIOLOGY CLINIC | Facility: CLINIC | Age: 78
End: 2018-09-19

## 2018-09-19 NOTE — TELEPHONE ENCOUNTER
Spoke with patient  She said she has an apt with the surgeon on the 21st to find out when she is to have the surgery   So do you still want her on the lasix even though you called it in?

## 2018-09-19 NOTE — TELEPHONE ENCOUNTER
PT WAS PUT ON FUROSEMIDE 40MG WHILE SHE WAS IN THE HOSPITAL IN [place]  PT PCP SENT A SCRIPT IN FOR FUROSEMIDE REFILL BUT WANTS PT TO ASK DR MONTOYA IF SHE SHOULD STILL BE ON THIS MED  PLEASE CALL PT AND LET HER KNOW   Katt Gonzalez

## 2018-09-19 NOTE — TELEPHONE ENCOUNTER
Patient called stating that she has a bp monitor at home but she has not been keeping track of her bp's    Is she supposed to take the lasix?

## 2018-09-19 NOTE — TELEPHONE ENCOUNTER
Please try to find out what her bp's are,  and let me know and I will put out a message to her cardiologist  ty

## 2018-09-20 NOTE — TELEPHONE ENCOUNTER
So she has not been keeping track  She will start now, she wants to know should she take the lasix then or wait   She knows your trying to reach out to cardio

## 2018-09-20 NOTE — TELEPHONE ENCOUNTER
Please let Dave Tineo know that I got in touch with Dr John Fields and he agrees she needs to be on her lasix  Take it

## 2018-09-20 NOTE — TELEPHONE ENCOUNTER
Good morning  John Ellis was put on furosemide while admitted to [facility]  She has been calling me to find out if she should continue this medication  Do you still want her on this medication?  Thanks, Raeann Herrmann

## 2018-09-21 ENCOUNTER — OFFICE VISIT (OUTPATIENT)
Dept: CARDIAC SURGERY | Facility: CLINIC | Age: 78
End: 2018-09-21
Payer: MEDICARE

## 2018-09-21 VITALS
SYSTOLIC BLOOD PRESSURE: 126 MMHG | HEIGHT: 55 IN | TEMPERATURE: 97.6 F | RESPIRATION RATE: 16 BRPM | DIASTOLIC BLOOD PRESSURE: 52 MMHG | BODY MASS INDEX: 50.91 KG/M2 | WEIGHT: 220 LBS | OXYGEN SATURATION: 93 % | HEART RATE: 69 BPM

## 2018-09-21 DIAGNOSIS — I35.0 SEVERE AORTIC STENOSIS: Primary | ICD-10-CM

## 2018-09-21 DIAGNOSIS — Z86.2 HISTORY OF ANEMIA: ICD-10-CM

## 2018-09-21 PROCEDURE — 99215 OFFICE O/P EST HI 40 MIN: CPT | Performed by: NURSE PRACTITIONER

## 2018-09-21 PROCEDURE — 1124F ACP DISCUSS-NO DSCNMKR DOCD: CPT | Performed by: INTERNAL MEDICINE

## 2018-09-21 RX ORDER — CHLORHEXIDINE GLUCONATE 0.12 MG/ML
15 RINSE ORAL ONCE
Status: CANCELLED | OUTPATIENT
Start: 2018-09-21 | End: 2018-09-21

## 2018-09-21 NOTE — PROGRESS NOTES
History & Physical - Cardiothoracic Surgery   Michel Gomez 65 y o  female MRN: [id number]    Physician Requesting Consult: Dr Philip Dhillon    Reason for Consult / Principal Problem: Aortic stenosis, Non-Rheumatic    History of Present Illness: Michel Gomez is a 65y o  year old female who was previously evaluated in our office by CHAVEZ Mota  for transcatheter aortic valve replacement  During this initial consultation, arrangements were made for the following studies to be completed: Gated CTA of the chest/abdomen/pelvis, 3D GARRISON, left and right cardiac catheterization, dental clearance, pulmonary function tests with ABG, and carotid artery ultrasound  Michel Gomez now presents to review the results of these tests and obtain a second surgeon to confirm the suitability of proceeding with transcatheter aortic valve replacment  Fartun Dinh was referred to our office after being hospitalized for acute exacerbation of CHF  She was evaluated with cardiac catheterization and echocardiogram which demonstrated severe aortic stenosis  She was stabilized and discharged home on supplemental oxygen and diuretic therapy  She admits to exertional dyspnea with minimal activity  She denies chest pain, presyncope/syncope, palpitations, PND or orthopnea  She admits to lower extremity edema which is improved with diuretic therapy  She also has recently been in the process of work up for anemia   She was discovered to have heme positive stool and will be seen post op by a GI specialist      Past Medical History:  Past Medical History:   Diagnosis Date    Anemia 08/22/2018    Aortic valve disease     Arthritis     Cardiac disease     "leaky valve"    CHF (congestive heart failure) (HCC)     Coronary artery disease     CPAP (continuous positive airway pressure) dependence     Disease of thyroid gland     Dislocation of right shoulder joint     Hyperlipidemia     Hypertension     Hypothyroid 08/22/2018    Murmur, cardiac     Obesity, morbid ([facility] [address] ) 08/22/2018    Pulmonary hypertension ([facility] [address] ) 08/22/2018    Shortness of breath     Simple goiter     Skin cyst     within the armpits, right    Sleep apnea          Past Surgical History:   Past Surgical History:   Procedure Laterality Date    BREAST BIOPSY      CARPAL TUNNEL RELEASE      CHOLECYSTECTOMY      DILATION AND CURETTAGE OF UTERUS      HYSTEROSCOPY      JOINT REPLACEMENT      b/l knee     JOINT REPLACEMENT  2007    left hip    AK COLONOSCOPY FLX DX W/COLLJ SPEC WHEN PFRMD N/A 9/6/2018    Procedure: COLONOSCOPY;  Surgeon: Adalgisa Duggan MD;  Location: [facility]; Service: Gastroenterology    AK ESOPHAGOGASTRODUODENOSCOPY TRANSORAL DIAGNOSTIC N/A 8/31/2018    Procedure: ESOPHAGOGASTRODUODENOSCOPY (EGD); Surgeon: Adalgisa Duggan MD;  Location: [facility]; Service: Gastroenterology         Family History:  Family History   Problem Relation Age of Onset    Diabetes Mother     Stroke Mother     Cancer Father     Lung cancer Father     Diabetes Sister     Heart disease Sister     Coronary artery disease Family     Diabetes Family     Hypertension Family     Cancer Family     Stroke Family          Social History:    History   Alcohol Use No     History   Drug Use No     History   Smoking Status    Never Smoker   Smokeless Tobacco    Never Used       Home Medications:   Prior to Admission medications    Medication Sig Start Date End Date Taking?  Authorizing Provider   aspirin (ECOTRIN LOW STRENGTH) 81 mg EC tablet Take 1 tablet by mouth daily 10/31/17  Yes Estelle Ramires MD   b complex vitamins capsule Take 1 capsule by mouth 2 (two) times a day     Yes Historical Provider, MD   Calcium Carb-Cholecalciferol (CALCIUM 600 + D PO) Take 1 tablet by mouth 2 (two) times a day   Yes Historical Provider, MD   Fesoterodine Fumarate ER (TOVIAZ) 8 MG TB24 Take 8 mg by mouth daily   Yes Historical Provider, MD   fluticasone (FLONASE) 50 mcg/act nasal spray 1 spray into each nostril daily   Yes Historical Provider, MD   furosemide (LASIX) 40 mg tablet Take 1 tablet (40 mg total) by mouth daily 9/18/18  Yes MABEL Castro   IRON-VITAMIN C PO Take 1 tablet by mouth daily   Yes Historical Provider, MD   levothyroxine 50 mcg tablet Take 50 mcg by mouth daily   Yes Historical Provider, MD   loratadine (CLARITIN) 10 mg tablet Take 10 mg by mouth daily   Yes Historical Provider, MD   losartan (COZAAR) 50 mg tablet Take 0 5 tablets (25 mg total) by mouth daily 8/13/18  Yes MABEL Castro   omeprazole (PriLOSEC) 40 MG capsule Take 40 mg by mouth 2 (two) times a day 6/23/18  Yes Historical Provider, MD   sertraline (ZOLOFT) 50 mg tablet Take 50 mg by mouth daily   Yes Historical Provider, MD   simvastatin (ZOCOR) 40 mg tablet Take 40 mg by mouth daily at bedtime   Yes Historical Provider, MD   temazepam (RESTORIL) 15 mg capsule Take 15 mg by mouth daily 7/2/18  Yes Historical Provider, MD   albuterol (2 5 mg/3 mL) 0 083 % nebulizer solution Take 3 mL by nebulization every 6 (six) hours as needed for wheezing or shortness of breath  Patient not taking: Reported on 9/21/2018  10/30/17   Sue Louis MD   mupirocin (BACTROBAN) 2 % ointment Apply 1 application topically 2 (two) times a day for 5 days Apply to each nostril twice daily for five days before your operation  9/21/18 9/26/18  MABEL Hutson   polyethylene glycol-electrolytes (NULYTELY) 4000 mL solution Take 4,000 mL by mouth once for 1 dose 8/31/18 8/31/18  Kaykay Jj MD       Allergies:   Allergies   Allergen Reactions    Latex Rash    Neosporin [Neomycin-Bacitracin Zn-Polymyx] Rash and Other (See Comments)     hives per [facility] order       Review of Systems:  Review of Systems - History obtained from chart review and the patient  General ROS: positive for  - fatigue  negative for - chills or fever  Psychological ROS: negative  Ophthalmic ROS: negative  Hematological and Lymphatic ROS: positive for anemia; negative for - bleeding problems, blood clots or bruising  Respiratory ROS: positive for - shortness of breath  negative for - cough or hemoptysis  Cardiovascular ROS: positive for - dyspnea on exertion, edema and murmur  negative for - chest pain  Gastrointestinal ROS: no abdominal pain, change in bowel habits, or black or bloody stools  Genito-Urinary ROS: no dysuria, trouble voiding, or hematuria  Musculoskeletal ROS: positive for - gait disturbance secondary to b/l TKR's and THR  negative for - muscular weakness  Neurological ROS: no TIA or stroke symptoms    Vital Signs:     Vitals:    09/21/18 1000 09/21/18 1036   BP: 122/56 126/52   BP Location: Left arm Right arm   Cuff Size: Adult    Pulse: 69    Resp: 16    Temp: 97 6 °F (36 4 °C)    TempSrc: Oral    SpO2: 93%    Weight: 99 8 kg (220 lb)    Height: 4' 7" (1 397 m)        Physical Exam:    General: morbidly obese, no acute distress  HEENT/NECK:  PERRLA  No jugular venous distention  Cardiac:Regular rate and rhythm, III/VI harsh systolic murmur RUSB  Carotids: 1+ pulses, delayed upstrokes, no bruits  Pulmonary:  Breath sounds clear bilaterally  Abdomen:  Non-tender, Non-distended  Positive bowel sounds  Upper extremities: 2+ radial pulses; brisk capillary refill; right hand dominant  Lower extremities: Extremities warm/dry  PT/DP pulses 1+ bilaterally  1+ edema B/L  Neuro: Alert and oriented X 3  Sensation is grossly intact  No focal deficits  Musculoskeletal: MAEE  Ambulates with walker  Skin: Warm/Dry, without rashes or lesions      Lab Results:   Lab Results   Component Value Date    WBC 8 75 08/15/2018    HGB 9 8 (L) 08/15/2018    HCT 33 2 (L) 08/15/2018    MCV 79 (L) 08/15/2018     (H) 08/15/2018     Lab Results   Component Value Date    CALCIUM 10 4 (H) 08/29/2018     08/29/2018    K 3 6 08/29/2018    CO2 34 (H) 08/29/2018     08/29/2018    BUN 24 08/29/2018 CREATININE 0 90 08/29/2018     No results found for: CHOL  Lab Results   Component Value Date    HDL 59 08/15/2018    HDL 65 (H) 06/05/2018     Lab Results   Component Value Date    LDLCALC 72 08/15/2018    LDLCALC 51 06/05/2018     Lab Results   Component Value Date    TRIG 112 08/15/2018    TRIG 83 06/05/2018     No components found for: CHOLHDL      No results found for: HGBA1C  Lab Results   Component Value Date    TROPONINI <0 02 08/14/2018       Imaging Studies:     Gated CTA of the chest/abdomen/pelvis:   VASCULAR STRUCTURES:       Annulus: diameter 25 x 22 mm      area: 430 sq mm    Annulus to LCA: 12 mm    Annulus to RCA: 13 mm    Minimal diameter right iliofemoral segment: 7 8 mm    Minimal diameter left iliofemoral segment: 7 9 mm    3D GARRISON:   SUMMARY     LEFT VENTRICLE:  Systolic function was normal  Ejection fraction was estimated to be 60 %  There were no regional wall motion abnormalities      LEFT ATRIUM:  The atrium was dilated      ATRIAL SEPTUM:  There was increased thickness of the septum, consistent with lipomatous hypertrophy  No defect or patent foramen ovale was identified by color doppler      MITRAL VALVE:  There was mild to moderate annular calcification  There was mild regurgitation      AORTIC VALVE:  LVOT and annular dimentions: Annular area 4 14 cm2; annular perimeter 7 31 cm; transverse diameter 2 31 cm; anteroposterior diameter 2 12 cm; LVOT 1 9 cm; aortic root 2 56cm, sinotubular junction 2 18 cm; ascending aorta 3 34cm  Transaortic velocity was increased due to valvular stenosis  There was severe stenosis  There was mild regurgitation  Valve mean gradient was 38 mmHg  Estimated aortic valve area (by VTI) was 0 71 cm squared      TRICUSPID VALVE:  There was mild regurgitation      AORTA:  There was mild atheroma in the proximal descending aorta  Left and right cardiac catheterization:   CORONARY CIRCULATION:  Left main: Normal   LAD: The vessel was normal sized  Angiography showed minor luminal irregularities  Circumflex: The vessel was small sized  Angiography showed minor luminal irregularities  Ramus intermedius: The vessel was normal sized  Angiography showed minor luminal irregularities  RCA: The vessel was large sized (dominant)  Angiography showed minor luminal irregularities      CARDIAC STRUCTURES:  There was severe aortic stenosis      Pulmonary function tests:   Results:  FEV1/FVC Ratio:  109 %  Forced Vital Capacity:  1 33 L, 72 % predicted  FEV1:  1 13 L, 79 % predicted  After administration of bronchodilator FEV1:  1 37 L, 95% predicted with an appreciable response to the bronchodilator      Lung volumes by body nitrogen wash out : Total Lung Capacity 78 % predicted Residual volume  61 % predicted     DLCO corrected for patients hemoglobin level:  16 % Data set appears appropriate for interpretation except for the DLCO, patient could not give a good effort  Carotid artery ultrasound:   RIGHT:  There is <50% stenosis noted in the internal carotid artery  Plaque is  heterogenous and irregular  Vertebral artery flow is antegrade  There is no significant subclavian artery  disease  LEFT:  There is <50% stenosis noted in the internal carotid artery  Plaque is  heterogenous and irregular  Vertebral artery flow is antegrade  There is no significant subclavian artery  disease  I have personally reviewed pertinent reports        TAVR evaluation Assessment:     5 Meter Walk Test:      Attempt 1: 9 sec   Attempt 2: 9 sec   Attempt 3: 9sec    STS Risk Score: 2 9%        Maribel 122: III    KCCQ-12 completed    Assessment:  Patient Active Problem List    Diagnosis Date Noted    Gastroesophageal reflux disease without esophagitis 08/28/2018    Anemia 08/22/2018    Obesity, morbid ([address] ) 08/22/2018    Shortness of breath 08/14/2018    Chronic diastolic (congestive) heart failure ([address] ) 08/14/2018    Severe aortic stenosis 07/03/2018    Reactive airway disease without complication 02/19/1072    JANICE (obstructive sleep apnea) 01/31/2018    Hyperlipidemia 12/04/2017    Hypertension 12/03/2017    Community acquired pneumonia of left lower lobe of lung ([facility] [address] ) 12/03/2017    GERD (gastroesophageal reflux disease) 10/29/2017    Hypothyroid 20/10/1763    Diastolic dysfunction 94/71/9073    LVH (left ventricular hypertrophy) 09/22/2016    Mitral annular calcification 09/22/2016    Mitral valve stenosis 09/22/2016    [facility] 09/22/2016    Morbid obesity ([facility] ) 06/02/2016     Severe aortic stenosis; Proceed with TAVR     Plan:    Yolie Mckenna has severe symptomatic aortic stenosis  Based on their STS risk assessment, they have undergone testing for transcatheter aortic valve replacement  The results of these studies have been interpreted by two independent cardiac surgeons who have determined the patient to be Intermediate risk for open aortic valve replacement  The risks, benefits, and alternatives to TAVR were discussed in detail with the patient today  They understand and wish to proceed with transfemoral transcatheter aortic valve replacement  Informed consent was obtained and a date for the intervention has been set  Yolie Mckenna was comfortable with our recommendations, and their questions were answered to their satisfaction  The following preoperative instructions were provided at the conclusion of their consultation:     1  You will receive a phone call from the hospital between 2:00 PM and 8:00 PM the day prior to surgery to confirm arrival time and location  For surgery on Mondays, you will receive a call on Friday  2  Do not drink or eat anything after midnight the night before surgery  That includes no water, candy, gum, lozenges, Lifesavers, etc  We recommend you not eat any salty or fatty snack food, consume alcohol or smoke the night before surgery  3  Continue taking aspirin but only 81 mg daily    4  If you take Coumadin and/or Plavix, discontinue it 5 days before surgery  5  If you are diabetic, do not take any of your diabetic pills the morning of surgery  If you take Lantus insulin, you may take it at your regularly scheduled time the day before surgery  Do not take any other insulins the morning of surgery  6  The 2 nights before surgery, take a shower each night using the special antiseptic soap or soap sponges you received from the office or hospital Charlsie Merlin your hair with regular shampoo and rinse completely before using the antiseptic sponges  Use the sponge to wash from your neck down, with special attention to the armpits and groin area  Do not use any other soap or cleanser on your skin  Do not use lotions, powder, deodorant or perfume of any kind on your skin after you shower  Use clean bed linens and wear clean pajamas after your shower  7  You will be prescribed Mupirocin nasal ointment  Apply to both nostrils twice a day for 5 days prior to surgery  8  Do not take a shower the morning of her surgery; you'll be given a special" bath" at the hospital   9  Notify the CT surgery office if you develop a cold, sore throat, cough, fever or other health issues before your surgery    10  Other medication changes included the following: stop multivitamin now and stop Losartan 3 days before surgery      SIGNATURE: MABEL Wolf  DATE: September 21, 2018  TIME: 11:42 AM

## 2018-09-21 NOTE — LETTER
September 21, 2018     Eric Schwartz 43  [address]    Patient: Yary Lindo   YOB: 1940   Date of Visit: 9/21/2018       Dear Dr Mitchel Yanes:    Thank you for referring Jordan Wood to me for evaluation  Below are my notes for this consultation  If you have questions, please do not hesitate to call me  I look forward to following your patient along with you  Sincerely,        Alex Engle MD        CC: MABEL Armenta, [facility]  9/21/2018 12:00 PM  Attested  History & Physical - Cardiothoracic Surgery   Yary Lindo 65 y o  female MRN: [id number]    Physician Requesting Consult: Dr Mitchel Yanes    Reason for Consult / Principal Problem: Aortic stenosis, Non-Rheumatic    History of Present Illness: Yary Lindo is a 65y o  year old female who was previously evaluated in our office by CHAVEZ Lawrence  for transcatheter aortic valve replacement  During this initial consultation, arrangements were made for the following studies to be completed: Gated CTA of the chest/abdomen/pelvis, 3D GARRISON, left and right cardiac catheterization, dental clearance, pulmonary function tests with ABG, and carotid artery ultrasound  Yary Lindo now presents to review the results of these tests and obtain a second surgeon to confirm the suitability of proceeding with transcatheter aortic valve replacment  Karina Vasquez was referred to our office after being hospitalized for acute exacerbation of CHF  She was evaluated with cardiac catheterization and echocardiogram which demonstrated severe aortic stenosis  She was stabilized and discharged home on supplemental oxygen and diuretic therapy  She admits to exertional dyspnea with minimal activity  She denies chest pain, presyncope/syncope, palpitations, PND or orthopnea  She admits to lower extremity edema which is improved with diuretic therapy       She also has recently been in the process of work up for anemia  She was discovered to have heme positive stool and will be seen post op by a GI specialist      Past Medical History:  Past Medical History:   Diagnosis Date    Anemia 08/22/2018    Aortic valve disease     Arthritis     Cardiac disease     "leaky valve"    CHF (congestive heart failure) (HCC)     Coronary artery disease     CPAP (continuous positive airway pressure) dependence     Disease of thyroid gland     Dislocation of right shoulder joint     Hyperlipidemia     Hypertension     Hypothyroid 08/22/2018    Murmur, cardiac     Obesity, morbid ([facility] [address] ) 08/22/2018    Pulmonary hypertension ([facility] [address] ) 08/22/2018    Shortness of breath     Simple goiter     Skin cyst     within the armpits, right    Sleep apnea          Past Surgical History:   Past Surgical History:   Procedure Laterality Date    BREAST BIOPSY      CARPAL TUNNEL RELEASE      CHOLECYSTECTOMY      DILATION AND CURETTAGE OF UTERUS      HYSTEROSCOPY      JOINT REPLACEMENT      b/l knee     JOINT REPLACEMENT  2007    left hip    CT COLONOSCOPY FLX DX W/COLLJ SPEC WHEN PFRMD N/A 9/6/2018    Procedure: COLONOSCOPY;  Surgeon: Peter Alonzo MD;  Location: [facility]; Service: Gastroenterology    CT ESOPHAGOGASTRODUODENOSCOPY TRANSORAL DIAGNOSTIC N/A 8/31/2018    Procedure: ESOPHAGOGASTRODUODENOSCOPY (EGD); Surgeon: Peter Alonzo MD;  Location: [facility];   Service: Gastroenterology         Family History:  Family History   Problem Relation Age of Onset    Diabetes Mother     Stroke Mother    Georgeana Sly Father     Lung cancer Father     Diabetes Sister     Heart disease Sister     Coronary artery disease Family     Diabetes Family     Hypertension Family     Cancer Family     Stroke Family          Social History:    History   Alcohol Use No     History   Drug Use No     History   Smoking Status    Never Smoker   Smokeless Tobacco    Never Used       Home Medications:   Prior to Admission medications Medication Sig Start Date End Date Taking? Authorizing Provider   aspirin (ECOTRIN LOW STRENGTH) 81 mg EC tablet Take 1 tablet by mouth daily 10/31/17  Yes Denzel Valenzuela MD   b complex vitamins capsule Take 1 capsule by mouth 2 (two) times a day     Yes Historical Provider, MD   Calcium Carb-Cholecalciferol (CALCIUM 600 + D PO) Take 1 tablet by mouth 2 (two) times a day   Yes Historical Provider, MD   Fesoterodine Fumarate ER (TOVIAZ) 8 MG TB24 Take 8 mg by mouth daily   Yes Historical Provider, MD   fluticasone (FLONASE) 50 mcg/act nasal spray 1 spray into each nostril daily   Yes Historical Provider, MD   furosemide (LASIX) 40 mg tablet Take 1 tablet (40 mg total) by mouth daily 9/18/18  Yes MABEL Castro   IRON-VITAMIN C PO Take 1 tablet by mouth daily   Yes Historical Provider, MD   levothyroxine 50 mcg tablet Take 50 mcg by mouth daily   Yes Historical Provider, MD   loratadine (CLARITIN) 10 mg tablet Take 10 mg by mouth daily   Yes Historical Provider, MD   losartan (COZAAR) 50 mg tablet Take 0 5 tablets (25 mg total) by mouth daily 8/13/18  Yes MABEL Castro   omeprazole (PriLOSEC) 40 MG capsule Take 40 mg by mouth 2 (two) times a day 6/23/18  Yes Historical Provider, MD   sertraline (ZOLOFT) 50 mg tablet Take 50 mg by mouth daily   Yes Historical Provider, MD   simvastatin (ZOCOR) 40 mg tablet Take 40 mg by mouth daily at bedtime   Yes Historical Provider, MD   temazepam (RESTORIL) 15 mg capsule Take 15 mg by mouth daily 7/2/18  Yes Historical Provider, MD   albuterol (2 5 mg/3 mL) 0 083 % nebulizer solution Take 3 mL by nebulization every 6 (six) hours as needed for wheezing or shortness of breath  Patient not taking: Reported on 9/21/2018  10/30/17   Denzel Valenzuela MD   mupirocin (BACTROBAN) 2 % ointment Apply 1 application topically 2 (two) times a day for 5 days Apply to each nostril twice daily for five days before your operation   9/21/18 9/26/18  MABEL Dia polyethylene glycol-electrolytes (NULYTELY) 4000 mL solution Take 4,000 mL by mouth once for 1 dose 8/31/18 8/31/18  Mariajose Chamberlain MD       Allergies: Allergies   Allergen Reactions    Latex Rash    Neosporin [Neomycin-Bacitracin Zn-Polymyx] Rash and Other (See Comments)     hives per [facility] order       Review of Systems:  Review of Systems - History obtained from chart review and the patient  General ROS: positive for  - fatigue  negative for - chills or fever  Psychological ROS: negative  Ophthalmic ROS: negative  Hematological and Lymphatic ROS: positive for anemia; negative for - bleeding problems, blood clots or bruising  Respiratory ROS: positive for - shortness of breath  negative for - cough or hemoptysis  Cardiovascular ROS: positive for - dyspnea on exertion, edema and murmur  negative for - chest pain  Gastrointestinal ROS: no abdominal pain, change in bowel habits, or black or bloody stools  Genito-Urinary ROS: no dysuria, trouble voiding, or hematuria  Musculoskeletal ROS: positive for - gait disturbance secondary to b/l TKR's and THR  negative for - muscular weakness  Neurological ROS: no TIA or stroke symptoms    Vital Signs:     Vitals:    09/21/18 1000 09/21/18 1036   BP: 122/56 126/52   BP Location: Left arm Right arm   Cuff Size: Adult    Pulse: 69    Resp: 16    Temp: 97 6 °F (36 4 °C)    TempSrc: Oral    SpO2: 93%    Weight: 99 8 kg (220 lb)    Height: 4' 7" (1 397 m)        Physical Exam:    General: morbidly obese, no acute distress  HEENT/NECK:  PERRLA  No jugular venous distention  Cardiac:Regular rate and rhythm, III/VI harsh systolic murmur RUSB  Carotids: 1+ pulses, delayed upstrokes, no bruits  Pulmonary:  Breath sounds clear bilaterally  Abdomen:  Non-tender, Non-distended  Positive bowel sounds  Upper extremities: 2+ radial pulses; brisk capillary refill; right hand dominant  Lower extremities: Extremities warm/dry  PT/DP pulses 1+ bilaterally    1+ edema B/L  Neuro: Alert and oriented X 3  Sensation is grossly intact  No focal deficits  Musculoskeletal: MAEE  Ambulates with walker  Skin: Warm/Dry, without rashes or lesions  Lab Results:   Lab Results   Component Value Date    WBC 8 75 08/15/2018    HGB 9 8 (L) 08/15/2018    HCT 33 2 (L) 08/15/2018    MCV 79 (L) 08/15/2018     (H) 08/15/2018     Lab Results   Component Value Date    CALCIUM 10 4 (H) 08/29/2018     08/29/2018    K 3 6 08/29/2018    CO2 34 (H) 08/29/2018     08/29/2018    BUN 24 08/29/2018    CREATININE 0 90 08/29/2018     No results found for: CHOL  Lab Results   Component Value Date    HDL 59 08/15/2018    HDL 65 (H) 06/05/2018     Lab Results   Component Value Date    LDLCALC 72 08/15/2018    LDLCALC 51 06/05/2018     Lab Results   Component Value Date    TRIG 112 08/15/2018    TRIG 83 06/05/2018     No components found for: CHOLHDL      No results found for: HGBA1C  Lab Results   Component Value Date    TROPONINI <0 02 08/14/2018       Imaging Studies:     Gated CTA of the chest/abdomen/pelvis:   VASCULAR STRUCTURES:       Annulus: diameter 25 x 22 mm      area: 430 sq mm    Annulus to LCA: 12 mm    Annulus to RCA: 13 mm    Minimal diameter right iliofemoral segment: 7 8 mm    Minimal diameter left iliofemoral segment: 7 9 mm    3D GARRISON:   SUMMARY     LEFT VENTRICLE:  Systolic function was normal  Ejection fraction was estimated to be 60 %  There were no regional wall motion abnormalities      LEFT ATRIUM:  The atrium was dilated      ATRIAL SEPTUM:  There was increased thickness of the septum, consistent with lipomatous hypertrophy  No defect or patent foramen ovale was identified by color doppler      MITRAL VALVE:  There was mild to moderate annular calcification  There was mild regurgitation      AORTIC VALVE:  LVOT and annular dimentions:  Annular area 4 14 cm2; annular perimeter 7 31 cm; transverse diameter 2 31 cm; anteroposterior diameter 2 12 cm; LVOT 1 9 cm; aortic root 2 56cm, sinotubular junction 2 18 cm; ascending aorta 3 34cm  Transaortic velocity was increased due to valvular stenosis  There was severe stenosis  There was mild regurgitation  Valve mean gradient was 38 mmHg  Estimated aortic valve area (by VTI) was 0 71 cm squared      TRICUSPID VALVE:  There was mild regurgitation      AORTA:  There was mild atheroma in the proximal descending aorta  Left and right cardiac catheterization:   CORONARY CIRCULATION:  Left main: Normal   LAD: The vessel was normal sized  Angiography showed minor luminal irregularities  Circumflex: The vessel was small sized  Angiography showed minor luminal irregularities  Ramus intermedius: The vessel was normal sized  Angiography showed minor luminal irregularities  RCA: The vessel was large sized (dominant)  Angiography showed minor luminal irregularities      CARDIAC STRUCTURES:  There was severe aortic stenosis      Pulmonary function tests:   Results:  FEV1/FVC Ratio:  109 %  Forced Vital Capacity:  1 33 L, 72 % predicted  FEV1:  1 13 L, 79 % predicted  After administration of bronchodilator FEV1:  1 37 L, 95% predicted with an appreciable response to the bronchodilator      Lung volumes by body nitrogen wash out : Total Lung Capacity 78 % predicted Residual volume  61 % predicted     DLCO corrected for patients hemoglobin level:  16 % Data set appears appropriate for interpretation except for the DLCO, patient could not give a good effort  Carotid artery ultrasound:   RIGHT:  There is <50% stenosis noted in the internal carotid artery  Plaque is  heterogenous and irregular  Vertebral artery flow is antegrade  There is no significant subclavian artery  disease  LEFT:  There is <50% stenosis noted in the internal carotid artery  Plaque is  heterogenous and irregular  Vertebral artery flow is antegrade  There is no significant subclavian artery  disease  I have personally reviewed pertinent reports        TAVR evaluation Assessment:     5 Meter Walk Test:      Attempt 1: 9 sec   Attempt 2: 9 sec   Attempt 3: 9sec    STS Risk Score: 2 9%      Robert López 122: III    KCCQ-12 completed    Assessment:  Patient Active Problem List    Diagnosis Date Noted    Gastroesophageal reflux disease without esophagitis 08/28/2018    Anemia 08/22/2018    Obesity, morbid ([facility] [address] ) 08/22/2018    Shortness of breath 08/14/2018    Chronic diastolic (congestive) heart failure ([facility] [address] ) 08/14/2018    Severe aortic stenosis 07/03/2018    Reactive airway disease without complication 16/42/2914    JANICE (obstructive sleep apnea) 01/31/2018    Hyperlipidemia 12/04/2017    Hypertension 12/03/2017    [facility] 12/03/2017    GERD (gastroesophageal reflux disease) 10/29/2017    Hypothyroid 15/30/2429    Diastolic dysfunction 98/48/1390    LVH (left ventricular hypertrophy) 09/22/2016    Mitral annular calcification 09/22/2016    Mitral valve stenosis 09/22/2016    Pulmonary hypertension ([facility] [address] ) 09/22/2016    Morbid [facility] 06/02/2016     Severe aortic stenosis; Proceed with TAVR     Plan:    Bridger Becerar has severe symptomatic aortic stenosis  Based on their STS risk assessment, they have undergone testing for transcatheter aortic valve replacement  The results of these studies have been interpreted by two independent cardiac surgeons who have determined the patient to be Intermediate risk for open aortic valve replacement  The risks, benefits, and alternatives to TAVR were discussed in detail with the patient today  They understand and wish to proceed with transfemoral transcatheter aortic valve replacement  Informed consent was obtained and a date for the intervention has been set  Bridger Becerra was comfortable with our recommendations, and their questions were answered to their satisfaction        The following preoperative instructions were provided at the conclusion of their consultation:     1  You will receive a phone call from the hospital between 2:00 PM and 8:00 PM the day prior to surgery to confirm arrival time and location  For surgery on Mondays, you will receive a call on Friday  2  Do not drink or eat anything after midnight the night before surgery  That includes no water, candy, gum, lozenges, Lifesavers, etc  We recommend you not eat any salty or fatty snack food, consume alcohol or smoke the night before surgery  3  Continue taking aspirin but only 81 mg daily  4  If you take Coumadin and/or Plavix, discontinue it 5 days before surgery  5  If you are diabetic, do not take any of your diabetic pills the morning of surgery  If you take Lantus insulin, you may take it at your regularly scheduled time the day before surgery  Do not take any other insulins the morning of surgery  6  The 2 nights before surgery, take a shower each night using the special antiseptic soap or soap sponges you received from the office or hospital  Ngoc Pals your hair with regular shampoo and rinse completely before using the antiseptic sponges  Use the sponge to wash from your neck down, with special attention to the armpits and groin area  Do not use any other soap or cleanser on your skin  Do not use lotions, powder, deodorant or perfume of any kind on your skin after you shower  Use clean bed linens and wear clean pajamas after your shower  7  You will be prescribed Mupirocin nasal ointment  Apply to both nostrils twice a day for 5 days prior to surgery  8  Do not take a shower the morning of her surgery; you'll be given a special" bath" at the hospital   9  Notify the CT surgery office if you develop a cold, sore throat, cough, fever or other health issues before your surgery    10  Other medication changes included the following: stop multivitamin now and stop Losartan 3 days before surgery      SIGNATURE: MABEL Jimenez  DATE: September 21, 2018  TIME: 11:42 AM  Attestation signed by Samantha Rosas MD at 9/21/2018 12:39 PM:  Pt seen and examined with CRNP  I agree with the above assessment and plan  It was my pleasure to evaluate Jered Mullen today for Severe Aortic Stenosis  She is referred for consideration of transcatheter aortic valve replacement  I reviewed all of the available testing and I had a lengthy discussion with the patient and sister(s) and have recommended transcatheter aortic valve replacement  Routine preoperative testing has been completed and reviewed  I reviewed the diagnosis, natural history of the disease, and treatment options  I have recommended transcatheter aortic valve replacement  I reviewed the risks, benefits, and alternatives to surgery  I also reviewed details of the proposed operation, the expected inpatient and outpatient recovery, and the potential for complications  The patient and sister(s) indicated understanding of the above and wish to proceed  Informed consent was obtained  Surgery will be scheduled for 10/9/18  It should be noted that she is not an appropriate candidate for consideration of surgical valve replacement        SIGNATUREAbe Prader, MD  DATE: September 21, 2018  TIME: 12:37 PM

## 2018-09-26 ENCOUNTER — OFFICE VISIT (OUTPATIENT)
Dept: CARDIOLOGY CLINIC | Facility: CLINIC | Age: 78
End: 2018-09-26
Payer: MEDICARE

## 2018-09-26 VITALS
WEIGHT: 219.2 LBS | BODY MASS INDEX: 50.73 KG/M2 | OXYGEN SATURATION: 90 % | DIASTOLIC BLOOD PRESSURE: 66 MMHG | HEART RATE: 76 BPM | HEIGHT: 55 IN | SYSTOLIC BLOOD PRESSURE: 122 MMHG

## 2018-09-26 DIAGNOSIS — I05.9 MITRAL ANNULAR CALCIFICATION: ICD-10-CM

## 2018-09-26 DIAGNOSIS — I10 ESSENTIAL HYPERTENSION: ICD-10-CM

## 2018-09-26 DIAGNOSIS — R06.00 DYSPNEA ON EXERTION: ICD-10-CM

## 2018-09-26 DIAGNOSIS — I35.1 NONRHEUMATIC AORTIC VALVE INSUFFICIENCY: ICD-10-CM

## 2018-09-26 DIAGNOSIS — I50.32 CHRONIC DIASTOLIC (CONGESTIVE) HEART FAILURE (HCC): ICD-10-CM

## 2018-09-26 DIAGNOSIS — E66.01 MORBID OBESITY (HCC): ICD-10-CM

## 2018-09-26 DIAGNOSIS — I51.7 LVH (LEFT VENTRICULAR HYPERTROPHY): ICD-10-CM

## 2018-09-26 DIAGNOSIS — I34.2 NON-RHEUMATIC MITRAL VALVE STENOSIS: ICD-10-CM

## 2018-09-26 DIAGNOSIS — G47.33 OSA (OBSTRUCTIVE SLEEP APNEA): ICD-10-CM

## 2018-09-26 DIAGNOSIS — E78.2 MIXED HYPERLIPIDEMIA: ICD-10-CM

## 2018-09-26 DIAGNOSIS — I27.20 PULMONARY HYPERTENSION (HCC): ICD-10-CM

## 2018-09-26 DIAGNOSIS — I35.0 SEVERE AORTIC STENOSIS: Primary | ICD-10-CM

## 2018-09-26 PROCEDURE — 99215 OFFICE O/P EST HI 40 MIN: CPT | Performed by: INTERNAL MEDICINE

## 2018-09-26 RX ORDER — POTASSIUM CHLORIDE 750 MG/1
10 TABLET, EXTENDED RELEASE ORAL 2 TIMES DAILY
Qty: 30 TABLET | Refills: 2 | Status: SHIPPED | OUTPATIENT
Start: 2018-09-26 | End: 2018-10-11 | Stop reason: HOSPADM

## 2018-09-26 RX ORDER — FUROSEMIDE 20 MG/1
20 TABLET ORAL DAILY
Qty: 30 TABLET | Refills: 2 | Status: ON HOLD | OUTPATIENT
Start: 2018-09-26 | End: 2018-10-11

## 2018-09-26 RX ORDER — METOPROLOL SUCCINATE 25 MG/1
25 TABLET, EXTENDED RELEASE ORAL DAILY
Qty: 90 TABLET | Refills: 1 | Status: ON HOLD | OUTPATIENT
Start: 2018-09-26 | End: 2018-10-11

## 2018-09-26 NOTE — PROGRESS NOTES
CARDIOLOGY OFFICE VISIT  [facility]  [address], [place], [facility] Tracy Borden  Tel: [phone]      NAME: Cristino Kimball  AGE: 65 y o  SEX: female  : 1940   MRN: [id number]      Chief Complaint:  Chief Complaint   Patient presents with    Follow-up     1 month    Fatigue         History of Present Illness:   Patient comes for follow up  Recently she was hospitalized and was diagnosed with acute diastolic CHF and severe aortic stenosis per cardiac catheterization  States she continues to be SOB with activity  Denies palpitations, lightheadedness, syncope, swelling feet, orthopnea, PND, claudication      Aortic stenosis severe - per cardiac cath  Evaluated by CT surgery and getting TAVR early next month     Hypertension, LVH, DD -  Has had it for many years  Takes her medications regularly  Denies lightheadedness, headache, medication side effects        Hyperlipidemia -  Has had it for few years  Takes her medications regularly  Denies myalgia  Her PCP closely monitor the blood work      PHTN - from JANICE, valvular disease     Morbid obesity -  Unable to lose weight due to inability to exercise    JANICE - now uses CPAP   She does have history of pulmonary hypertension     Past Medical History:  Past Medical History:   Diagnosis Date    Anemia 2018    Aortic valve disease     Arthritis     Cardiac disease     "leaky valve"    CHF (congestive heart failure) (HCC)     Coronary artery disease     CPAP (continuous positive airway pressure) dependence     Disease of thyroid gland     Dislocation of right shoulder joint     Hyperlipidemia     Hypertension     Hypothyroid 2018    Murmur, cardiac     Obesity, morbid ([address] ) 2018    Pulmonary hypertension ([address] ) 2018    Shortness of breath     Simple goiter     Skin cyst     within the armpits, right    Sleep apnea          Past Surgical History:  Past Surgical History:   Procedure Laterality Date    BREAST BIOPSY      CARPAL TUNNEL RELEASE      CHOLECYSTECTOMY      DILATION AND CURETTAGE OF UTERUS      HYSTEROSCOPY      JOINT REPLACEMENT      b/l knee     JOINT REPLACEMENT  2007    left hip    TX COLONOSCOPY FLX DX W/COLLJ SPEC WHEN PFRMD N/A 9/6/2018    Procedure: COLONOSCOPY;  Surgeon: Amilcar Alvarado MD;  Location: [facility]; Service: Gastroenterology    TX ESOPHAGOGASTRODUODENOSCOPY TRANSORAL DIAGNOSTIC N/A 8/31/2018    Procedure: ESOPHAGOGASTRODUODENOSCOPY (EGD); Surgeon: Amilcar Alvarado MD;  Location: [facility];   Service: Gastroenterology         Family History:  Family History   Problem Relation Age of Onset    Diabetes Mother     Stroke Mother     Cancer Father     Lung cancer Father     Diabetes Sister     Heart disease Sister     Coronary artery disease Family     Diabetes Family     Hypertension Family     Cancer Family     Stroke Family          Social History:  Social History     Social History    Marital status: Single     Spouse name: N/A    Number of children: N/A    Years of education: N/A     Social History Main Topics    Smoking status: Never Smoker    Smokeless tobacco: Never Used    Alcohol use No    Drug use: No    Sexual activity: No     Other Topics Concern    None     Social History Narrative    Denied drinking coffee    Denied exercise habits             Active Problems:  Patient Active Problem List   Diagnosis    GERD (gastroesophageal reflux disease)    Hypothyroid    Hypertension    Community acquired pneumonia of left lower lobe of lung ([address] )    Hyperlipidemia    Reactive airway disease without complication    JANICE (obstructive sleep apnea)    Severe aortic stenosis    Diastolic dysfunction    LVH (left ventricular hypertrophy)    Mitral annular calcification    Mitral valve stenosis    Morbid obesity ([address] )    Pulmonary hypertension (HCC)    Shortness of breath    Chronic diastolic (congestive) heart failure (HCC)    Anemia    Obesity, morbid (HCC)    Gastroesophageal reflux disease without esophagitis         The following portions of the patient's history were reviewed and updated as appropriate: past medical history, past surgical history, past family history,  past social history, current medications, allergies and problem list       Review of Systems:  Constitutional: Denies fever, chills  +fatigue  Eyes: Denies eye redness, eye discharge, double vision  ENT: Denies hearing loss, tinnitus, sneezing, nasal discharge, sore throat   Respiratory: Denies cough, expectoration, hemoptysis  +shortness of breath  Cardiovascular: Denies chest pain, palpitations, orthopnea, PND, lower extremity swelling  Gastrointestinal: Denies abdominal pain, nausea, vomiting, hematemesis, diarrhea, bloody stools  Genito-Urinary: Denies dysuria, incontinence  Musculoskeletal: Denies back pain, joint pain, muscle pain  Neurologic: Denies confusion, lightheadedness, syncope, headache, focal weakness, sensory changes, seizures  Endocrine: Denies polyuria, polydipsia, temperature intolerance  Allergy and Immunology: Denies hives, insect bite sensitivity  Hematological and Lymphatic: Denies bleeding problems, swollen glands   Psychological: Denies depression, suicidal ideation, anxiety, panic  Dermatological: Denies pruritus, rash, skin lesion changes      Vitals:  Vitals:    09/26/18 1605   BP: 122/66   Pulse: 76   SpO2: 90%       Body mass index is 50 95 kg/m²  Weight (last 2 days)     Date/Time   Weight    09/26/18 1605  99 4 (219 2)                Physical Examination:  General:   Morbidly obese  Using a walker for support  Patient is not in acute distress  Awake, alert, oriented in time, place and person  Responding to commands  Head: Normocephalic  Atraumatic  Eyes: Both pupils normal sized, round and reactive to light  Nonicteric  ENT: Normal external ear canals  Nares normal, no drainage   Lips and oral mucosa normal  Neck: Supple  JVP not raised  Trachea central  No thyromegaly  Lungs: Bilateral bronchovascular breath sounds with no crackles or rhonchi  Chest wall: No tenderness  Cardiovascular: RRR  Grade 3/6 APOLINAR at base  Gastrointestinal: Abdomen soft, nontender  No guarding or rigidity  Liver and spleen not palpable  Bowel sounds present  Neurologic: Patient is awake, alert, oriented in time, place and person  Responding to command  Moving all extremities  Integumentary:  No skin rash  Lymphatic: No cervical lymphadenopathy  Back: Symmetric   No CVA tenderness  Extremities: No clubbing, cyanosis or edema      Laboratory Results:  CBC with diff:   Lab Results   Component Value Date    WBC 8 75 08/15/2018    RBC 4 21 08/15/2018    HGB 9 8 (L) 08/15/2018    HCT 33 2 (L) 08/15/2018    MCV 79 (L) 08/15/2018    MCH 23 3 (L) 08/15/2018    RDW 19 0 (H) 08/15/2018     (H) 08/15/2018       CMP:  Lab Results   Component Value Date    CREATININE 0 90 08/29/2018    BUN 24 08/29/2018     08/29/2018    K 3 6 08/29/2018     08/29/2018    CO2 34 (H) 08/29/2018    ALKPHOS 86 08/13/2018    ALT 18 08/13/2018    AST 18 08/13/2018       Lab Results   Component Value Date    MG 2 0 06/05/2018       Lab Results   Component Value Date    TROPONINI <0 02 08/14/2018    TROPONINI <0 02 08/14/2018    TROPONINI <0 02 08/13/2018       Lipid Profile:   No results found for: CHOL  Lab Results   Component Value Date    HDL 59 08/15/2018    HDL 65 (H) 06/05/2018     Lab Results   Component Value Date    LDLCALC 72 08/15/2018    LDLCALC 51 06/05/2018     Lab Results   Component Value Date    TRIG 112 08/15/2018    TRIG 83 06/05/2018       Cardiac testing:   Results for orders placed during the hospital encounter of 08/13/18   Echo complete with contrast if indicated    Narrative [address] [place] [id number] [phone]    Transthoracic Echocardiogram  2D, M-mode, Doppler, and Color Doppler    Study date:  14-Aug-2018    Patient: Sailaja Rivas  MR number: [id number]  Account number: [de-identified]  : 1940  Age: 65 years  Gender: Female  Status: Inpatient  Location: Bedside  Height: 57 in  Weight: 224 lb  BP: 139/ 60 mmHg    Indications: Dyspnea, shortness of breath    Diagnoses: R06 00 - Dyspnea, unspecified    Sonographer:  Starla Cobian RDCS  Interpreting Physician:  Vanna Motley MD  Referring Physician:  Suad Espinoza PA-C  Group:  [facility]    SUMMARY    LEFT VENTRICLE:  Systolic function was normal  Ejection fraction was estimated in the range of 55 % to 65 %  There were no regional wall motion abnormalities  There was mild concentric hypertrophy  Doppler parameters were consistent with abnormal left ventricular relaxation (grade 1 diastolic dysfunction)  LEFT ATRIUM:  The atrium was mildly dilated  MITRAL VALVE:  There was mild annular calcification  AORTIC VALVE:  The valve was not visualized well enough to rule out a bicuspid morphology  Leaflets exhibited marked calcification and markedly reduced cuspal separation  Transaortic velocity was increased due to valvular stenosis  There was moderate to severe stenosis  There was mild regurgitation  TRICUSPID VALVE:  There was mild regurgitation  Estimated peak PA pressure was 40 mmHg  HISTORY: PRIOR HISTORY: Risk factors: CAD, hypertension, hypercholesterolemia, JANICE and morbid obesity  PROCEDURE: The procedure was performed at the bedside  This was a routine study  The transthoracic approach was used  The study included complete 2D imaging, M-mode, complete spectral Doppler, and color Doppler  The heart rate was 66 bpm,  at the start of the study  Images were obtained from the parasternal, apical, subcostal, and suprasternal notch acoustic windows  Echocardiographic views were limited due to decreased penetration and lung interference   Image quality was  adequate  LEFT VENTRICLE: Size was normal  Systolic function was normal  Ejection fraction was estimated in the range of 55 % to 65 %  There were no regional wall motion abnormalities  There was mild concentric hypertrophy  DOPPLER: Doppler  parameters were consistent with abnormal left ventricular relaxation (grade 1 diastolic dysfunction)  RIGHT VENTRICLE: The size was normal  Systolic function was normal  Wall thickness was normal     LEFT ATRIUM: The atrium was mildly dilated  RIGHT ATRIUM: Size was normal     MITRAL VALVE: There was mild annular calcification  Valve structure was normal  There was normal leaflet separation  DOPPLER: The transmitral velocity was within the normal range  There was no evidence for stenosis  There was no  regurgitation  AORTIC VALVE: The valve was not visualized well enough to rule out a bicuspid morphology  Leaflets exhibited marked calcification and markedly reduced cuspal separation  DOPPLER: Transaortic velocity was increased due to valvular stenosis  There was moderate to severe stenosis  There was mild regurgitation  TRICUSPID VALVE: The valve structure was normal  There was normal leaflet separation  DOPPLER: The transtricuspid velocity was within the normal range  There was no evidence for stenosis  There was mild regurgitation  Estimated peak PA  pressure was 40 mmHg  PULMONIC VALVE: Leaflets exhibited normal thickness, no calcification, and normal cuspal separation  DOPPLER: The transpulmonic velocity was within the normal range  There was no regurgitation  PERICARDIUM: There was no pericardial effusion  The pericardium was normal in appearance  AORTA: The root exhibited normal size  SYSTEMIC VEINS: IVC: The inferior vena cava was normal in size and course   Respirophasic changes were normal     SYSTEM MEASUREMENT TABLES    2D  %FS: 36 9 %  Ao Diam: 3 2 cm  EDV(Teich): 89 2 ml  EF(Teich): 67 %  ESV(Teich): 29 5 ml  IVSd: 1 2 cm  LA Area: 25 9 cm2  LA Diam: 4 1 cm  LVEDV MOD A4C: 83 7 ml  LVEF MOD A4C: 83 2 %  LVESV MOD A4C: 14 ml  LVIDd: 4 4 cm  LVIDs: 2 8 cm  LVLd A4C: 8 2 cm  LVLs A4C: 5 9 cm  LVOT Diam: 2 1 cm  LVPWd: 1 2 cm  RA Area: 18 4 cm2  RVIDd: 4 4 cm  SV MOD A4C: 69 6 ml  SV(Teich): 59 7 ml    CW  AR Dec Hale: 2 4 m/s2  AR Dec Time: 1508 3 ms  AR PHT: 437 4 ms  AR Vmax: 3 6 m/s  AR maxP 4 mmHg  AV Env  Ti: 296 9 ms  AV VTI: 90 6 cm  AV Vmax: 4 1 m/s  AV Vmean: 3 1 m/s  AV maxP 1 mmHg  AV meanP 1 mmHg  TR Vmax: 3 3 m/s  TR maxP 3 mmHg    MM  TAPSE: 2 4 cm    PW  VASU (VTI): 1 3 cm2  VASU Vmax: 1 cm2  E': 0 1 m/s  E/E': 18  LVOT Env  Ti: 388 2 ms  LVOT VTI: 34 7 cm  LVOT Vmax: 1 3 m/s  LVOT Vmean: 0 9 m/s  LVOT maxP 6 mmHg  LVOT meanPG: 3 8 mmHg  LVSV Dopp: 114 6 ml  MV A Jose: 1 4 m/s  MV Dec Hale: 1 8 m/s2  MV DecT: 541 5 ms  MV E Jose: 1 m/s  MV E/A Ratio: 0 7  MV PHT: 157 ms  MVA By PHT: 1 4 cm2    [facility]    Prepared and electronically signed by    Ranulfo Ramirez MD  Signed 14-Aug-2018 18:35:14         Medications:    Current Outpatient Prescriptions:     albuterol (2 5 mg/3 mL) 0 083 % nebulizer solution, Take 3 mL by nebulization every 6 (six) hours as needed for wheezing or shortness of breath, Disp: 75 mL, Rfl: 0    aspirin (ECOTRIN LOW STRENGTH) 81 mg EC tablet, Take 1 tablet by mouth daily, Disp: 30 tablet, Rfl: 0    b complex vitamins capsule, Take 1 capsule by mouth 2 (two) times a day  , Disp: , Rfl:     Calcium Carb-Cholecalciferol (CALCIUM 600 + D PO), Take 1 tablet by mouth 2 (two) times a day, Disp: , Rfl:     Fesoterodine Fumarate ER (TOVIAZ) 8 MG TB24, Take 8 mg by mouth daily, Disp: , Rfl:     furosemide (LASIX) 20 mg tablet, Take 1 tablet (20 mg total) by mouth daily, Disp: 30 tablet, Rfl: 2    IRON-VITAMIN C PO, Take 1 tablet by mouth daily, Disp: , Rfl:     levothyroxine 50 mcg tablet, Take 50 mcg by mouth daily, Disp: , Rfl:    loratadine (CLARITIN) 10 mg tablet, Take 10 mg by mouth daily, Disp: , Rfl:     omeprazole (PriLOSEC) 40 MG capsule, Take 40 mg by mouth 2 (two) times a day, Disp: , Rfl: 1    sertraline (ZOLOFT) 50 mg tablet, Take 50 mg by mouth daily, Disp: , Rfl:     simvastatin (ZOCOR) 40 mg tablet, Take 40 mg by mouth daily at bedtime, Disp: , Rfl:     temazepam (RESTORIL) 15 mg capsule, Take 15 mg by mouth daily, Disp: , Rfl: 1    fluticasone (FLONASE) 50 mcg/act nasal spray, 1 spray into each nostril daily, Disp: , Rfl:     metoprolol succinate (TOPROL-XL) 25 mg 24 hr tablet, Take 1 tablet (25 mg total) by mouth daily, Disp: 90 tablet, Rfl: 1    polyethylene glycol-electrolytes (NULYTELY) 4000 mL solution, Take 4,000 mL by mouth once for 1 dose, Disp: 4000 mL, Rfl: 0    potassium chloride (K-DUR,KLOR-CON) 10 mEq tablet, Take 1 tablet (10 mEq total) by mouth 2 (two) times a day, Disp: 30 tablet, Rfl: 2      Allergies: Allergies   Allergen Reactions    Latex Rash    Neosporin [Neomycin-Bacitracin Zn-Polymyx] Rash and Other (See Comments)     hives per [facility] order         Assessment and Plan:  1  Shortness of breath   likely multifactorial from severe AS, morbid obesity, pulmonary hypertension    2  Severe aortic stenosis   scheduled for TAVR early next month    3  Chronic diastolic (congestive) heart failure (HCC)   low-salt diet  Daily weight  Continue furosemide (decrease dose to 20 mg)  Losartan changed to beta-blocker for upcoming surgery as well as to avoid an ARB in view of severe AS  Potassium added    4  Mitral annular calcification   follow up with serial echocardiograms    5  Essential hypertension   BP stable  Losartan changed to beta-blocker    6  Mixed hyperlipidemia   continue statin and diet control  Her PCP closely monitor the blood work    7  LVH (left ventricular hypertrophy)   tight BP control    8  Non-rheumatic mitral valve stenosis   follow up with serial echocardiograms    9   Pulmonary hypertension ([facility] [address] )   likely from JANICE, MS, AS    10  Morbid obesity ([address] )   unable to lose weight    11  Nonrheumatic aortic valve insufficiency  Getting TAVR    12  JANICE (obstructive sleep apnea)   regular CPAP use promoted    Recommend aggressive risk factor modification and therapeutic lifestyle changes  Low-salt, low-calorie, low-fat, low-cholesterol diet with regular exercise and to optimize weight  I will defer the ordering and monitoring of necessity lab studies to you, but I am available and happy to review and manage any of the data at your request in the future  Discussed concepts of atherosclerosis, including signs and symptoms of cardiac disease  Previous studies were reviewed  Safety measures were reviewed  Questions were entertained and answered  Patient was advised to report any problems requiring medical attention  Follow-up with PCP and appropriate specialist and lab work as discussed  Return for follow up visit as scheduled or earlier, if needed  Thank you for allowing me to participate in the care and evaluation of your patient  Should you have any questions, please feel free to contact me        Jeffrey Swift MD  4/96/7739,6:21 PM

## 2018-09-27 ENCOUNTER — APPOINTMENT (OUTPATIENT)
Dept: LAB | Facility: HOSPITAL | Age: 78
End: 2018-09-27
Payer: MEDICARE

## 2018-09-27 ENCOUNTER — TRANSCRIBE ORDERS (OUTPATIENT)
Dept: ADMINISTRATIVE | Facility: HOSPITAL | Age: 78
End: 2018-09-27

## 2018-09-27 DIAGNOSIS — Z86.2 HISTORY OF ANEMIA: ICD-10-CM

## 2018-09-27 DIAGNOSIS — I35.0 SEVERE AORTIC STENOSIS: ICD-10-CM

## 2018-09-27 DIAGNOSIS — I35.0 SEVERE AORTIC STENOSIS: Primary | ICD-10-CM

## 2018-09-27 LAB
ABO GROUP BLD: NORMAL
BLD GP AB SCN SERPL QL: NEGATIVE
EST. AVERAGE GLUCOSE BLD GHB EST-MCNC: 117 MG/DL
HBA1C MFR BLD: 5.7 % (ref 4.2–6.3)
INR PPP: 1.03 (ref 0.86–1.17)
PROTHROMBIN TIME: 13.4 SECONDS (ref 11.8–14.2)
RH BLD: POSITIVE
SPECIMEN EXPIRATION DATE: NORMAL

## 2018-09-27 PROCEDURE — 83036 HEMOGLOBIN GLYCOSYLATED A1C: CPT

## 2018-09-27 PROCEDURE — 86901 BLOOD TYPING SEROLOGIC RH(D): CPT

## 2018-09-27 PROCEDURE — 86900 BLOOD TYPING SEROLOGIC ABO: CPT

## 2018-09-27 PROCEDURE — 86850 RBC ANTIBODY SCREEN: CPT

## 2018-09-27 PROCEDURE — 36415 COLL VENOUS BLD VENIPUNCTURE: CPT

## 2018-09-27 PROCEDURE — 85610 PROTHROMBIN TIME: CPT

## 2018-10-02 ENCOUNTER — OFFICE VISIT (OUTPATIENT)
Dept: FAMILY MEDICINE CLINIC | Facility: CLINIC | Age: 78
End: 2018-10-02
Payer: MEDICARE

## 2018-10-02 ENCOUNTER — LAB REQUISITION (OUTPATIENT)
Dept: LAB | Facility: HOSPITAL | Age: 78
End: 2018-10-02
Payer: MEDICARE

## 2018-10-02 VITALS
HEART RATE: 70 BPM | SYSTOLIC BLOOD PRESSURE: 120 MMHG | BODY MASS INDEX: 51.01 KG/M2 | OXYGEN SATURATION: 90 % | HEIGHT: 55 IN | DIASTOLIC BLOOD PRESSURE: 60 MMHG | WEIGHT: 220.4 LBS | TEMPERATURE: 98.8 F | RESPIRATION RATE: 16 BRPM

## 2018-10-02 DIAGNOSIS — J02.9 ACUTE PHARYNGITIS: ICD-10-CM

## 2018-10-02 DIAGNOSIS — J02.9 SORE THROAT: Primary | ICD-10-CM

## 2018-10-02 DIAGNOSIS — R05.9 COUGH: ICD-10-CM

## 2018-10-02 PROBLEM — K21.9 GERD (GASTROESOPHAGEAL REFLUX DISEASE): Chronic | Status: RESOLVED | Noted: 2017-10-29 | Resolved: 2018-10-02

## 2018-10-02 LAB — S PYO AG THROAT QL: NEGATIVE

## 2018-10-02 PROCEDURE — 87070 CULTURE OTHR SPECIMN AEROBIC: CPT | Performed by: FAMILY MEDICINE

## 2018-10-02 PROCEDURE — 99213 OFFICE O/P EST LOW 20 MIN: CPT | Performed by: FAMILY MEDICINE

## 2018-10-02 PROCEDURE — 87880 STREP A ASSAY W/OPTIC: CPT | Performed by: FAMILY MEDICINE

## 2018-10-02 RX ORDER — BENZONATATE 200 MG/1
200 CAPSULE ORAL 3 TIMES DAILY PRN
Qty: 20 CAPSULE | Refills: 0 | Status: SHIPPED | OUTPATIENT
Start: 2018-10-02 | End: 2018-10-18 | Stop reason: CLARIF

## 2018-10-02 NOTE — PROGRESS NOTES
Assessment/Plan:     Chronic Problems:  No problem-specific Assessment & Plan notes found for this encounter  Visit Diagnosis:  Diagnoses and all orders for this visit:    Sore throat  Comments:  Use Coldeze lozenges and tea with honey  Strep negative  Orders:  -     Rapid Strep A Screen With Reflex to Culture, Pediatrics and Compromised Adults; Future    Cough  Comments:  Use benzonatate to reduce cough  Orders:  -     benzonatate (TESSALON) 200 MG capsule; Take 1 capsule (200 mg total) by mouth 3 (three) times a day as needed for cough          Subjective:    Patient ID: Nieves Cooper is a 65 y o  female  Patient is having a sore throat with a cough that started yesterday  She took tylenol and some cough drops with minimal relief  The cough is minimally productive  She is very worried that this will affect her surgery next week  Takes all other meds as directed  No side effects noted  The following portions of the patient's history were reviewed and updated as appropriate: allergies, current medications, past family history, past medical history, past social history, past surgical history and problem list     Review of Systems   Constitutional: Negative for chills, fatigue and fever  HENT: Negative for congestion, sinus pain and sinus pressure  Eyes: Negative  Respiratory: Negative for chest tightness, wheezing and stridor  On 2L NC   Cardiovascular: Negative for chest pain and palpitations  Gastrointestinal: Negative  Genitourinary: Negative  Musculoskeletal: Negative  Neurological: Negative for dizziness, weakness and light-headedness           /60   Pulse 70   Temp 98 8 °F (37 1 °C)   Resp 16   Ht 4' 7" (1 397 m)   Wt 100 kg (220 lb 6 4 oz)   SpO2 90% Comment: with oxygen  BMI 51 23 kg/m²   Social History     Social History    Marital status: Single     Spouse name: N/A    Number of children: N/A    Years of education: N/A     Occupational History  Not on file  Social History Main Topics    Smoking status: Never Smoker    Smokeless tobacco: Never Used    Alcohol use No    Drug use: No    Sexual activity: No     Other Topics Concern    Not on file     Social History Narrative    Denied drinking coffee    Denied exercise habits         Past Medical History:   Diagnosis Date    Anemia 08/22/2018    Aortic valve disease     Arthritis     Cardiac disease     "leaky valve"    CHF (congestive heart failure) (HCC)     Coronary artery disease     CPAP (continuous positive airway pressure) dependence     Disease of thyroid gland     Dislocation of right shoulder joint     Hyperlipidemia     Hypertension     Hypothyroid 08/22/2018    Murmur, cardiac     Obesity, morbid ([facility] [address] ) 08/22/2018    Pulmonary hypertension ([facility] [address] ) 08/22/2018    Shortness of breath     Simple goiter     Skin cyst     within the armpits, right    Sleep apnea      Family History   Problem Relation Age of Onset    Diabetes Mother     Stroke Mother     Cancer Father     Lung cancer Father     Diabetes Sister     Heart disease Sister     Coronary artery disease Family     Diabetes Family     Hypertension Family     Cancer Family     Stroke Family      Past Surgical History:   Procedure Laterality Date    BREAST BIOPSY      CARPAL TUNNEL RELEASE      CHOLECYSTECTOMY      DILATION AND CURETTAGE OF UTERUS      HYSTEROSCOPY      JOINT REPLACEMENT      b/l knee     JOINT REPLACEMENT  2007    left hip    [place] COLONOSCOPY FLX DX W/COLLJ SPEC WHEN PFRMD N/A 9/6/2018    Procedure: COLONOSCOPY;  Surgeon: Kortney Tidwell MD;  Location: [facility]; Service: Gastroenterology    NJ ESOPHAGOGASTRODUODENOSCOPY TRANSORAL DIAGNOSTIC N/A 8/31/2018    Procedure: ESOPHAGOGASTRODUODENOSCOPY (EGD); Surgeon: Kortney Tidwell MD;  Location: [facility];   Service: Gastroenterology       Current Outpatient Prescriptions:     albuterol (2 5 mg/3 mL) 0 083 % nebulizer solution, Take 3 mL by nebulization every 6 (six) hours as needed for wheezing or shortness of breath, Disp: 75 mL, Rfl: 0    aspirin (ECOTRIN LOW STRENGTH) 81 mg EC tablet, Take 1 tablet by mouth daily, Disp: 30 tablet, Rfl: 0    b complex vitamins capsule, Take 1 capsule by mouth 2 (two) times a day  , Disp: , Rfl:     Calcium Carb-Cholecalciferol (CALCIUM 600 + D PO), Take 1 tablet by mouth 2 (two) times a day, Disp: , Rfl:     Fesoterodine Fumarate ER (TOVIAZ) 8 MG TB24, Take 8 mg by mouth daily, Disp: , Rfl:     fluticasone (FLONASE) 50 mcg/act nasal spray, 1 spray into each nostril daily, Disp: , Rfl:     furosemide (LASIX) 20 mg tablet, Take 1 tablet (20 mg total) by mouth daily, Disp: 30 tablet, Rfl: 2    IRON-VITAMIN C PO, Take 1 tablet by mouth daily, Disp: , Rfl:     levothyroxine 50 mcg tablet, Take 50 mcg by mouth daily, Disp: , Rfl:     loratadine (CLARITIN) 10 mg tablet, Take 10 mg by mouth daily, Disp: , Rfl:     metoprolol succinate (TOPROL-XL) 25 mg 24 hr tablet, Take 1 tablet (25 mg total) by mouth daily, Disp: 90 tablet, Rfl: 1    omeprazole (PriLOSEC) 40 MG capsule, Take 40 mg by mouth 2 (two) times a day, Disp: , Rfl: 1    potassium chloride (K-DUR,KLOR-CON) 10 mEq tablet, Take 1 tablet (10 mEq total) by mouth 2 (two) times a day, Disp: 30 tablet, Rfl: 2    sertraline (ZOLOFT) 50 mg tablet, Take 50 mg by mouth daily, Disp: , Rfl:     simvastatin (ZOCOR) 40 mg tablet, Take 40 mg by mouth daily at bedtime, Disp: , Rfl:     temazepam (RESTORIL) 15 mg capsule, Take 15 mg by mouth daily, Disp: , Rfl: 1    benzonatate (TESSALON) 200 MG capsule, Take 1 capsule (200 mg total) by mouth 3 (three) times a day as needed for cough, Disp: 20 capsule, Rfl: 0    polyethylene glycol-electrolytes (NULYTELY) 4000 mL solution, Take 4,000 mL by mouth once for 1 dose, Disp: 4000 mL, Rfl: 0    Allergies   Allergen Reactions    Latex Rash    Neosporin [Neomycin-Bacitracin Zn-Polymyx] Rash and Other (See Comments)     hives per [facility] order          Lab Review   Appointment on 09/27/2018   Component Date Value    Hemoglobin A1C 09/27/2018 5 7     EAG 09/27/2018 117     Protime 09/27/2018 13 4     INR 09/27/2018 1 03     ABO Grouping 09/27/2018 O     Rh Factor 09/27/2018 Positive     Antibody Screen 09/27/2018 Negative     Specimen Expiration Date 09/27/2018 [id number]    Appointment on 09/04/2018   Component Date Value    PTH 09/04/2018 121 9*   Admission on 08/31/2018, Discharged on 08/31/2018   Component Date Value    Case Report 08/31/2018                      Value:Surgical Pathology Report                         Case: [id number]                                   Authorizing Provider:  Reynaldo Greene MD   Collected:           08/31/2018 4710              Ordering Location:     [address] [place] [address] Received:            08/31/2018 1114                                     Operating Room                                                               Pathologist:           Akira Moralez MD                                                         Specimens:   A) - Duodenum, duodenum                                                                             B) - Stomach, stomach                                                                      Final Diagnosis 08/31/2018                      Value: This result contains rich text formatting which cannot be displayed here   Additional Information 08/31/2018                      Value: This result contains rich text formatting which cannot be displayed here  Eduarda Ashley Gross Description 08/31/2018                      Value: This result contains rich text formatting which cannot be displayed here      Clinical Information 08/31/2018                      Value:Cold biopsies r/o C Sprue   Appointment on 08/29/2018   Component Date Value    Sodium 08/29/2018 143     Potassium 08/29/2018 3 6     Chloride 08/29/2018 101     CO2 08/29/2018 34*    ANION GAP 08/29/2018 8     BUN 08/29/2018 24     Creatinine 08/29/2018 0 90     Glucose, Fasting 08/29/2018 98     Calcium 08/29/2018 10 4*    eGFR 08/29/2018 61     Albumin 08/29/2018 3 5     Corrected Calcium 08/29/2018 10 8*    CALCIUM FOR CA/ALB 08/29/2018 10 4*   Lab on 08/20/2018   Component Date Value    Clarity, UA 08/20/2018 Slightly Cloudy     Color, UA 08/20/2018 Yellow     Specific Gravity, UA 08/20/2018 1 020     pH, UA 08/20/2018 5 5     Glucose, UA 08/20/2018 Negative     Ketones, UA 08/20/2018 Negative     Blood, UA 08/20/2018 Negative     Protein, UA 08/20/2018 Negative     Nitrite, UA 08/20/2018 Negative     Bilirubin, UA 08/20/2018 Negative     Urobilinogen, UA 08/20/2018 0 2     Leukocytes, UA 08/20/2018 Small*    WBC, UA 08/20/2018 20-30*    RBC, UA 08/20/2018 None Seen     Bacteria, UA 08/20/2018 Innumerable*    Epithelial Cells 08/20/2018 Occasional     Urine Culture 08/20/2018 >100,000 cfu/ml Escherichia coli*    Iron Saturation 08/20/2018 7     TIBC 08/20/2018 387     Iron 08/20/2018 27*    Ferritin 08/20/2018 20    Admission on 08/13/2018, Discharged on 08/17/2018   Component Date Value    Troponin I 08/13/2018 <0 02     NT-proBNP 08/13/2018 207     Sodium 08/14/2018 142     Potassium 08/14/2018 4 1     Chloride 08/14/2018 105     CO2 08/14/2018 30     ANION GAP 08/14/2018 7     BUN 08/14/2018 20     Creatinine 08/14/2018 0 86     Glucose 08/14/2018 113     Calcium 08/14/2018 9 4     eGFR 08/14/2018 65     WBC 08/14/2018 8 77     RBC 08/14/2018 4 15     Hemoglobin 08/14/2018 9 6*    Hematocrit 08/14/2018 32 6*    MCV 08/14/2018 79*    MCH 08/14/2018 23 1*    MCHC 08/14/2018 29 4*    RDW 08/14/2018 19 1*    MPV 08/14/2018 8 5*    Platelets 49/87/5439 416*    nRBC 08/14/2018 0     Neutrophils Relative 08/14/2018 51     Immat GRANS % 08/14/2018 1     Lymphocytes Relative 08/14/2018 27     Monocytes Relative 08/14/2018 12     Eosinophils Relative 08/14/2018 8*    Basophils Relative 08/14/2018 1     Neutrophils Absolute 08/14/2018 4 44     Immature Grans Absolute 08/14/2018 0 05     Lymphocytes Absolute 08/14/2018 2 40     Monocytes Absolute 08/14/2018 1 09     Eosinophils Absolute 08/14/2018 0 72*    Basophils Absolute 08/14/2018 0 07     Troponin I 08/14/2018 <0 02     Troponin I 08/14/2018 <0 02     Cholesterol 08/15/2018 153     Triglycerides 08/15/2018 112     HDL, Direct 08/15/2018 59     LDL Calculated 08/15/2018 72     Non-HDL-Chol (CHOL-HDL) 08/15/2018 94     WBC 08/15/2018 8 75     RBC 08/15/2018 4 21     Hemoglobin 08/15/2018 9 8*    Hematocrit 08/15/2018 33 2*    MCV 08/15/2018 79*    MCH 08/15/2018 23 3*    MCHC 08/15/2018 29 5*    RDW 08/15/2018 19 0*    MPV 08/15/2018 9 0     Platelets 83/22/8538 429*    nRBC 08/15/2018 0     Neutrophils Relative 08/15/2018 56     Immat GRANS % 08/15/2018 1     Lymphocytes Relative 08/15/2018 24     Monocytes Relative 08/15/2018 11     Eosinophils Relative 08/15/2018 7*    Basophils Relative 08/15/2018 1     Neutrophils Absolute 08/15/2018 4 88     Immature Grans Absolute 08/15/2018 0 04     Lymphocytes Absolute 08/15/2018 2 13     Monocytes Absolute 08/15/2018 0 99     Eosinophils Absolute 08/15/2018 0 64*    Basophils Absolute 08/15/2018 0 07     Sodium 08/15/2018 142     Potassium 08/15/2018 4 3     Chloride 08/15/2018 105     CO2 08/15/2018 31     ANION GAP 08/15/2018 6     BUN 08/15/2018 25     Creatinine 08/15/2018 0 83     Glucose 08/15/2018 110     Calcium 08/15/2018 9 6     eGFR 08/15/2018 68     Ventricular Rate 08/13/2018 70     Atrial Rate 08/13/2018 70     AL Interval 08/13/2018 216     QRSD Interval 08/13/2018 78     QT Interval 08/13/2018 394     QTC Interval 08/13/2018 425     P Axis 08/13/2018 9     QRS Axis 08/13/2018 -4     T Wave [employer] 08/13/2018 56    Appointment on 08/13/2018   Component Date Value    WBC 08/13/2018 9 20     RBC 08/13/2018 4 19     Hemoglobin 08/13/2018 9 6*    Hematocrit 08/13/2018 32 9*    MCV 08/13/2018 79*    MCH 08/13/2018 22 9*    MCHC 08/13/2018 29 2*    RDW 08/13/2018 18 8*    MPV 08/13/2018 8 8*    Platelets 38/91/1283 426*    nRBC 08/13/2018 0     Neutrophils Relative 08/13/2018 58     Immat GRANS % 08/13/2018 0     Lymphocytes Relative 08/13/2018 22     Monocytes Relative 08/13/2018 11     Eosinophils Relative 08/13/2018 8*    Basophils Relative 08/13/2018 1     Neutrophils Absolute 08/13/2018 5 35     Immature Grans Absolute 08/13/2018 0 04     Lymphocytes Absolute 08/13/2018 2 00     Monocytes Absolute 08/13/2018 1 03     Eosinophils Absolute 08/13/2018 0 71*    Basophils Absolute 08/13/2018 0 07     Sodium 08/13/2018 141     Potassium 08/13/2018 4 5     Chloride 08/13/2018 104     CO2 08/13/2018 29     ANION GAP 08/13/2018 8     BUN 08/13/2018 22     Creatinine 08/13/2018 0 75     Glucose 08/13/2018 94     Calcium 08/13/2018 9 8     AST 08/13/2018 18     ALT 08/13/2018 18     Alkaline Phosphatase 08/13/2018 86     Total Protein 08/13/2018 7 6     Albumin 08/13/2018 3 3*    Total Bilirubin 08/13/2018 0 20     eGFR 08/13/2018 77     D-Dimer, Quant 08/13/2018 1431*    NT-proBNP 08/13/2018 105         Imaging: No results found  Objective:     Physical Exam   Constitutional: She is oriented to person, place, and time  She appears well-developed and well-nourished  No distress  Pt is morbidly obese  HENT:   Head: Normocephalic and atraumatic  Right Ear: External ear normal    Mouth/Throat: Oropharynx is clear and moist    Left cerumen impaction  Eyes: Pupils are equal, round, and reactive to light  Conjunctivae and EOM are normal  Right eye exhibits no discharge  Left eye exhibits no discharge  Neck: Normal range of motion  Neck supple  Cardiovascular: Normal rate and regular rhythm  Exam reveals no friction rub      Murmur (grade 3/6 systolic murmur) heard   AS   Pulmonary/Chest: Effort normal and breath sounds normal  No respiratory distress  She has no wheezes  She has no rales  She exhibits no tenderness  Musculoskeletal: Normal range of motion  She exhibits no edema, tenderness or deformity  Ambulates with a walker  Lymphadenopathy:     She has no cervical adenopathy  Neurological: She is alert and oriented to person, place, and time  No cranial nerve deficit  Skin: Skin is warm and dry  No rash noted  She is not diaphoretic  Psychiatric: She has a normal mood and affect  Her behavior is normal  Judgment and thought content normal          Patient Instructions   Discussed all with patient  For sore throat, use Coldeze lozenges OTC and also tea with honey  Take benzonatate to reduce cough  Call surgeon to tell him you were here with complaints of sore throat and a cough  Lungs are clear and Strep is Negative  F/U sooner if fever or worse         MABEL Wallace

## 2018-10-02 NOTE — PATIENT INSTRUCTIONS
Discussed all with patient  For sore throat, use Coldeze lozenges OTC and also tea with honey  Take benzonatate to reduce cough  Call surgeon to tell him you were here with complaints of sore throat and a cough  Lungs are clear and Strep is Negative, but will send out strep screen back up  F/U sooner if fever or worse

## 2018-10-04 LAB — BACTERIA THROAT CULT: NORMAL

## 2018-10-08 ENCOUNTER — ANESTHESIA EVENT (INPATIENT)
Dept: PERIOP | Facility: HOSPITAL | Age: 78
DRG: 266 | End: 2018-10-08
Payer: MEDICARE

## 2018-10-09 ENCOUNTER — ANESTHESIA (INPATIENT)
Dept: PERIOP | Facility: HOSPITAL | Age: 78
DRG: 266 | End: 2018-10-09
Payer: MEDICARE

## 2018-10-09 ENCOUNTER — APPOINTMENT (INPATIENT)
Dept: RADIOLOGY | Facility: HOSPITAL | Age: 78
DRG: 266 | End: 2018-10-09
Payer: MEDICARE

## 2018-10-09 ENCOUNTER — APPOINTMENT (OUTPATIENT)
Dept: NON INVASIVE DIAGNOSTICS | Facility: HOSPITAL | Age: 78
DRG: 266 | End: 2018-10-09
Payer: MEDICARE

## 2018-10-09 ENCOUNTER — HOSPITAL ENCOUNTER (INPATIENT)
Facility: HOSPITAL | Age: 78
LOS: 2 days | Discharge: HOME HEALTH | DRG: 266 | End: 2018-10-11
Attending: THORACIC SURGERY (CARDIOTHORACIC VASCULAR SURGERY) | Admitting: THORACIC SURGERY (CARDIOTHORACIC VASCULAR SURGERY)
Payer: MEDICARE

## 2018-10-09 ENCOUNTER — APPOINTMENT (INPATIENT)
Dept: NON INVASIVE DIAGNOSTICS | Facility: HOSPITAL | Age: 78
DRG: 266 | End: 2018-10-09
Attending: THORACIC SURGERY (CARDIOTHORACIC VASCULAR SURGERY)
Payer: MEDICARE

## 2018-10-09 DIAGNOSIS — Z95.2 S/P TAVR (TRANSCATHETER AORTIC VALVE REPLACEMENT): Primary | ICD-10-CM

## 2018-10-09 DIAGNOSIS — I35.0 AORTIC STENOSIS, SEVERE: ICD-10-CM

## 2018-10-09 DIAGNOSIS — I50.32 CHRONIC DIASTOLIC (CONGESTIVE) HEART FAILURE (HCC): ICD-10-CM

## 2018-10-09 DIAGNOSIS — R06.00 DYSPNEA ON EXERTION: ICD-10-CM

## 2018-10-09 DIAGNOSIS — I35.9 AORTIC VALVE CALCIFICATION: ICD-10-CM

## 2018-10-09 PROBLEM — K21.9 GASTROESOPHAGEAL REFLUX DISEASE WITHOUT ESOPHAGITIS: Chronic | Status: ACTIVE | Noted: 2018-08-28

## 2018-10-09 PROBLEM — E66.01 OBESITY, MORBID (HCC): Chronic | Status: ACTIVE | Noted: 2018-08-22

## 2018-10-09 PROBLEM — R06.02 SHORTNESS OF BREATH: Status: RESOLVED | Noted: 2018-08-14 | Resolved: 2018-10-09

## 2018-10-09 PROBLEM — J18.9 COMMUNITY ACQUIRED PNEUMONIA OF LEFT LOWER LOBE OF LUNG: Status: RESOLVED | Noted: 2017-12-03 | Resolved: 2018-10-09

## 2018-10-09 LAB
ANCILLARY VALUES: 0
ANION GAP SERPL CALCULATED.3IONS-SCNC: 3 MMOL/L (ref 4–13)
ARTERIAL PATENCY WRIST A: 5
BASE EXCESS BLDA CALC-SCNC: 2 MMOL/L (ref -2–3)
BASE EXCESS BLDA CALC-SCNC: 4 MMOL/L (ref -2–3)
BUN SERPL-MCNC: 21 MG/DL (ref 5–25)
CA-I BLD-SCNC: 1.14 MMOL/L (ref 1.12–1.32)
CA-I BLD-SCNC: 1.15 MMOL/L (ref 1.12–1.32)
CA-I BLD-SCNC: 1.17 MMOL/L (ref 1.12–1.32)
CA-I BLD-SCNC: 1.2 MMOL/L (ref 1.12–1.32)
CALCIUM SERPL-MCNC: 8.1 MG/DL (ref 8.3–10.1)
CHLORIDE SERPL-SCNC: 108 MMOL/L (ref 100–108)
CO2 SERPL-SCNC: 29 MMOL/L (ref 21–32)
CREAT SERPL-MCNC: 0.68 MG/DL (ref 0.6–1.3)
DS:DELIVERY SYSTEM: AC
FIO2 GAS DIL.REBREATH: 100 L
GFR SERPL CREATININE-BSD FRML MDRD: 84 ML/MIN/1.73SQ M
GLUCOSE SERPL-MCNC: 100 MG/DL (ref 65–140)
GLUCOSE SERPL-MCNC: 101 MG/DL (ref 65–140)
GLUCOSE SERPL-MCNC: 89 MG/DL (ref 65–140)
GLUCOSE SERPL-MCNC: 90 MG/DL (ref 65–140)
GLUCOSE SERPL-MCNC: 95 MG/DL (ref 65–140)
GLUCOSE SERPL-MCNC: 97 MG/DL (ref 65–140)
GLUCOSE SERPL-MCNC: 99 MG/DL (ref 65–140)
HCO3 BLDA-SCNC: 26.1 MMOL/L (ref 22–28)
HCO3 BLDA-SCNC: 26.2 MMOL/L (ref 22–28)
HCO3 BLDA-SCNC: 26.8 MMOL/L (ref 22–28)
HCO3 BLDA-SCNC: 30.5 MMOL/L (ref 22–28)
HCT VFR BLD AUTO: 29.3 % (ref 34.8–46.1)
HCT VFR BLD CALC: 24 % (ref 34.8–46.1)
HCT VFR BLD CALC: 25 % (ref 34.8–46.1)
HGB BLD-MCNC: 8.5 G/DL (ref 11.5–15.4)
HGB BLDA-MCNC: 8.2 G/DL (ref 11.5–15.4)
HGB BLDA-MCNC: 8.5 G/DL (ref 11.5–15.4)
KCT BLD-ACNC: 115 SEC (ref 89–137)
KCT BLD-ACNC: 132 SEC (ref 89–137)
KCT BLD-ACNC: 354 SEC (ref 89–137)
PCO2 BLD: 27 MMOL/L (ref 21–32)
PCO2 BLD: 27 MMOL/L (ref 21–32)
PCO2 BLD: 28 MMOL/L (ref 21–32)
PCO2 BLD: 32 MMOL/L (ref 21–32)
PCO2 BLD: 38.9 MM HG (ref 36–44)
PCO2 BLD: 40.5 MM HG (ref 36–44)
PCO2 BLD: 43.6 MM HG (ref 36–44)
PCO2 BLD: 52.8 MM HG (ref 36–44)
PH BLD: 7.37 [PH] (ref 7.35–7.45)
PH BLD: 7.4 [PH] (ref 7.35–7.45)
PH BLD: 7.42 [PH] (ref 7.35–7.45)
PH BLD: 7.43 [PH] (ref 7.35–7.45)
PLATELET # BLD AUTO: 331 THOUSANDS/UL (ref 149–390)
PMV BLD AUTO: 9.2 FL (ref 8.9–12.7)
PO2 BLD: 121 MM HG (ref 75–129)
PO2 BLD: 132 MM HG (ref 75–129)
PO2 BLD: 229 MM HG (ref 75–129)
PO2 BLD: 91 MM HG (ref 75–129)
POTASSIUM BLD-SCNC: 3.7 MMOL/L (ref 3.5–5.3)
POTASSIUM BLD-SCNC: 3.8 MMOL/L (ref 3.5–5.3)
POTASSIUM BLD-SCNC: 3.8 MMOL/L (ref 3.5–5.3)
POTASSIUM BLD-SCNC: 3.9 MMOL/L (ref 3.5–5.3)
POTASSIUM SERPL-SCNC: 4 MMOL/L (ref 3.5–5.3)
POTASSIUM SERPL-SCNC: 4.1 MMOL/L (ref 3.5–5.3)
PRESSURE SETTING: 1
RESPIRATORY RATE: 142
SAO2 % BLD FROM PO2: 100 % (ref 95–98)
SAO2 % BLD FROM PO2: 97 % (ref 95–98)
SAO2 % BLD FROM PO2: 99 % (ref 95–98)
SAO2 % BLD FROM PO2: 99 % (ref 95–98)
SODIUM BLD-SCNC: 140 MMOL/L (ref 136–145)
SODIUM BLD-SCNC: 141 MMOL/L (ref 136–145)
SODIUM BLD-SCNC: 142 MMOL/L (ref 136–145)
SODIUM BLD-SCNC: 142 MMOL/L (ref 136–145)
SODIUM SERPL-SCNC: 140 MMOL/L (ref 136–145)
SPECIMEN SOURCE: ABNORMAL
SPECIMEN SOURCE: NORMAL
SPECIMEN SOURCE: NORMAL
VENTILATION VALUE: 202

## 2018-10-09 PROCEDURE — C1769 GUIDE WIRE: HCPCS | Performed by: THORACIC SURGERY (CARDIOTHORACIC VASCULAR SURGERY)

## 2018-10-09 PROCEDURE — 33361 REPLACE AORTIC VALVE PERQ: CPT | Performed by: INTERNAL MEDICINE

## 2018-10-09 PROCEDURE — 94002 VENT MGMT INPAT INIT DAY: CPT

## 2018-10-09 PROCEDURE — 76377 3D RENDER W/INTRP POSTPROCES: CPT

## 2018-10-09 PROCEDURE — 85014 HEMATOCRIT: CPT

## 2018-10-09 PROCEDURE — C1883 ADAPT/EXT, PACING/NEURO LEAD: HCPCS | Performed by: THORACIC SURGERY (CARDIOTHORACIC VASCULAR SURGERY)

## 2018-10-09 PROCEDURE — C1760 CLOSURE DEV, VASC: HCPCS | Performed by: THORACIC SURGERY (CARDIOTHORACIC VASCULAR SURGERY)

## 2018-10-09 PROCEDURE — 86920 COMPATIBILITY TEST SPIN: CPT

## 2018-10-09 PROCEDURE — 82948 REAGENT STRIP/BLOOD GLUCOSE: CPT

## 2018-10-09 PROCEDURE — 82330 ASSAY OF CALCIUM: CPT

## 2018-10-09 PROCEDURE — 02H633Z INSERTION OF INFUSION DEVICE INTO RIGHT ATRIUM, PERCUTANEOUS APPROACH: ICD-10-PCS | Performed by: THORACIC SURGERY (CARDIOTHORACIC VASCULAR SURGERY)

## 2018-10-09 PROCEDURE — 33361 REPLACE AORTIC VALVE PERQ: CPT | Performed by: THORACIC SURGERY (CARDIOTHORACIC VASCULAR SURGERY)

## 2018-10-09 PROCEDURE — 82803 BLOOD GASES ANY COMBINATION: CPT

## 2018-10-09 PROCEDURE — C1894 INTRO/SHEATH, NON-LASER: HCPCS | Performed by: THORACIC SURGERY (CARDIOTHORACIC VASCULAR SURGERY)

## 2018-10-09 PROCEDURE — 84132 ASSAY OF SERUM POTASSIUM: CPT

## 2018-10-09 PROCEDURE — 84132 ASSAY OF SERUM POTASSIUM: CPT | Performed by: PHYSICIAN ASSISTANT

## 2018-10-09 PROCEDURE — 85347 COAGULATION TIME ACTIVATED: CPT

## 2018-10-09 PROCEDURE — 82947 ASSAY GLUCOSE BLOOD QUANT: CPT

## 2018-10-09 PROCEDURE — 85014 HEMATOCRIT: CPT | Performed by: PHYSICIAN ASSISTANT

## 2018-10-09 PROCEDURE — 71045 X-RAY EXAM CHEST 1 VIEW: CPT

## 2018-10-09 PROCEDURE — 85049 AUTOMATED PLATELET COUNT: CPT | Performed by: PHYSICIAN ASSISTANT

## 2018-10-09 PROCEDURE — 99223 1ST HOSP IP/OBS HIGH 75: CPT | Performed by: STUDENT IN AN ORGANIZED HEALTH CARE EDUCATION/TRAINING PROGRAM

## 2018-10-09 PROCEDURE — 93312 ECHO TRANSESOPHAGEAL: CPT

## 2018-10-09 PROCEDURE — 94760 N-INVAS EAR/PLS OXIMETRY 1: CPT

## 2018-10-09 PROCEDURE — 02RF38Z REPLACEMENT OF AORTIC VALVE WITH ZOOPLASTIC TISSUE, PERCUTANEOUS APPROACH: ICD-10-PCS | Performed by: THORACIC SURGERY (CARDIOTHORACIC VASCULAR SURGERY)

## 2018-10-09 PROCEDURE — 84295 ASSAY OF SERUM SODIUM: CPT

## 2018-10-09 PROCEDURE — 80048 BASIC METABOLIC PNL TOTAL CA: CPT | Performed by: PHYSICIAN ASSISTANT

## 2018-10-09 PROCEDURE — 85018 HEMOGLOBIN: CPT | Performed by: PHYSICIAN ASSISTANT

## 2018-10-09 DEVICE — CLOSURE DEVICE ANGIO-SEAL VIP 6FR: Type: IMPLANTABLE DEVICE | Site: ARTERIAL | Status: FUNCTIONAL

## 2018-10-09 DEVICE — PERCLOSE PROGLIDE™ SUTURE-MEDIATED CLOSURE SYSTEM
Type: IMPLANTABLE DEVICE | Site: ARTERIAL | Status: FUNCTIONAL
Brand: PERCLOSE PROGLIDE™

## 2018-10-09 DEVICE — TAVR SAPIEN 3 CMNDR DLV SYS 23MM: Type: IMPLANTABLE DEVICE | Site: HEART | Status: FUNCTIONAL

## 2018-10-09 RX ORDER — PRAVASTATIN SODIUM 80 MG/1
80 TABLET ORAL
Status: DISCONTINUED | OUTPATIENT
Start: 2018-10-09 | End: 2018-10-11 | Stop reason: HOSPADM

## 2018-10-09 RX ORDER — POTASSIUM CHLORIDE 14.9 MG/ML
20 INJECTION INTRAVENOUS ONCE AS NEEDED
Status: COMPLETED | OUTPATIENT
Start: 2018-10-09 | End: 2018-10-10

## 2018-10-09 RX ORDER — POLYETHYLENE GLYCOL 3350 17 G/17G
17 POWDER, FOR SOLUTION ORAL DAILY
Status: DISCONTINUED | OUTPATIENT
Start: 2018-10-09 | End: 2018-10-11 | Stop reason: HOSPADM

## 2018-10-09 RX ORDER — ONDANSETRON 2 MG/ML
4 INJECTION INTRAMUSCULAR; INTRAVENOUS EVERY 6 HOURS PRN
Status: DISCONTINUED | OUTPATIENT
Start: 2018-10-09 | End: 2018-10-11 | Stop reason: HOSPADM

## 2018-10-09 RX ORDER — CHLORHEXIDINE GLUCONATE 0.12 MG/ML
15 RINSE ORAL ONCE
Status: COMPLETED | OUTPATIENT
Start: 2018-10-09 | End: 2018-10-09

## 2018-10-09 RX ORDER — POTASSIUM CHLORIDE 14.9 MG/ML
20 INJECTION INTRAVENOUS
Status: DISCONTINUED | OUTPATIENT
Start: 2018-10-09 | End: 2018-10-10

## 2018-10-09 RX ORDER — HEPARIN SODIUM 1000 [USP'U]/ML
INJECTION, SOLUTION INTRAVENOUS; SUBCUTANEOUS AS NEEDED
Status: DISCONTINUED | OUTPATIENT
Start: 2018-10-09 | End: 2018-10-09 | Stop reason: SURG

## 2018-10-09 RX ORDER — FENTANYL CITRATE 50 UG/ML
50 INJECTION, SOLUTION INTRAMUSCULAR; INTRAVENOUS ONCE
Status: COMPLETED | OUTPATIENT
Start: 2018-10-09 | End: 2018-10-09

## 2018-10-09 RX ORDER — ACETAMINOPHEN 650 MG/1
650 SUPPOSITORY RECTAL EVERY 4 HOURS PRN
Status: DISCONTINUED | OUTPATIENT
Start: 2018-10-09 | End: 2018-10-10

## 2018-10-09 RX ORDER — POTASSIUM CHLORIDE 14.9 MG/ML
20 INJECTION INTRAVENOUS
Status: COMPLETED | OUTPATIENT
Start: 2018-10-09 | End: 2018-10-10

## 2018-10-09 RX ORDER — FENTANYL CITRATE 50 UG/ML
50 INJECTION, SOLUTION INTRAMUSCULAR; INTRAVENOUS
Status: DISCONTINUED | OUTPATIENT
Start: 2018-10-09 | End: 2018-10-10

## 2018-10-09 RX ORDER — SODIUM CHLORIDE, SODIUM GLUCONATE, SODIUM ACETATE, POTASSIUM CHLORIDE, MAGNESIUM CHLORIDE, SODIUM PHOSPHATE, DIBASIC, AND POTASSIUM PHOSPHATE .53; .5; .37; .037; .03; .012; .00082 G/100ML; G/100ML; G/100ML; G/100ML; G/100ML; G/100ML; G/100ML
50 INJECTION, SOLUTION INTRAVENOUS CONTINUOUS
Status: DISCONTINUED | OUTPATIENT
Start: 2018-10-09 | End: 2018-10-09

## 2018-10-09 RX ORDER — FESOTERODINE FUMARATE 8 MG/1
8 TABLET, EXTENDED RELEASE ORAL DAILY
Status: DISCONTINUED | OUTPATIENT
Start: 2018-10-09 | End: 2018-10-09

## 2018-10-09 RX ORDER — FONDAPARINUX SODIUM 2.5 MG/.5ML
2.5 INJECTION SUBCUTANEOUS DAILY
Status: DISCONTINUED | OUTPATIENT
Start: 2018-10-10 | End: 2018-10-11 | Stop reason: HOSPADM

## 2018-10-09 RX ORDER — LORATADINE 10 MG/1
10 TABLET ORAL DAILY
Status: DISCONTINUED | OUTPATIENT
Start: 2018-10-09 | End: 2018-10-11 | Stop reason: HOSPADM

## 2018-10-09 RX ORDER — SIMVASTATIN 40 MG
40 TABLET ORAL
Status: DISCONTINUED | OUTPATIENT
Start: 2018-10-09 | End: 2018-10-09 | Stop reason: CLARIF

## 2018-10-09 RX ORDER — OXYBUTYNIN CHLORIDE 5 MG/1
5 TABLET ORAL 3 TIMES DAILY
Status: DISCONTINUED | OUTPATIENT
Start: 2018-10-09 | End: 2018-10-09

## 2018-10-09 RX ORDER — CEFAZOLIN SODIUM 1 G/3ML
INJECTION, POWDER, FOR SOLUTION INTRAMUSCULAR; INTRAVENOUS AS NEEDED
Status: DISCONTINUED | OUTPATIENT
Start: 2018-10-09 | End: 2018-10-09 | Stop reason: SURG

## 2018-10-09 RX ORDER — ONDANSETRON 2 MG/ML
INJECTION INTRAMUSCULAR; INTRAVENOUS AS NEEDED
Status: DISCONTINUED | OUTPATIENT
Start: 2018-10-09 | End: 2018-10-09 | Stop reason: SURG

## 2018-10-09 RX ORDER — BISACODYL 10 MG
10 SUPPOSITORY, RECTAL RECTAL DAILY PRN
Status: DISCONTINUED | OUTPATIENT
Start: 2018-10-09 | End: 2018-10-11 | Stop reason: HOSPADM

## 2018-10-09 RX ORDER — PANTOPRAZOLE SODIUM 40 MG/1
40 TABLET, DELAYED RELEASE ORAL DAILY
Status: DISCONTINUED | OUTPATIENT
Start: 2018-10-09 | End: 2018-10-11 | Stop reason: HOSPADM

## 2018-10-09 RX ORDER — MAGNESIUM HYDROXIDE 1200 MG/15ML
LIQUID ORAL AS NEEDED
Status: DISCONTINUED | OUTPATIENT
Start: 2018-10-09 | End: 2018-10-09 | Stop reason: HOSPADM

## 2018-10-09 RX ORDER — LIDOCAINE HYDROCHLORIDE 10 MG/ML
INJECTION, SOLUTION INFILTRATION; PERINEURAL AS NEEDED
Status: DISCONTINUED | OUTPATIENT
Start: 2018-10-09 | End: 2018-10-09 | Stop reason: SURG

## 2018-10-09 RX ORDER — LEVOTHYROXINE SODIUM 0.05 MG/1
50 TABLET ORAL DAILY
Status: DISCONTINUED | OUTPATIENT
Start: 2018-10-10 | End: 2018-10-11 | Stop reason: HOSPADM

## 2018-10-09 RX ORDER — PROPOFOL 10 MG/ML
INJECTION, EMULSION INTRAVENOUS AS NEEDED
Status: DISCONTINUED | OUTPATIENT
Start: 2018-10-09 | End: 2018-10-09 | Stop reason: SURG

## 2018-10-09 RX ORDER — ACETAMINOPHEN 325 MG/1
650 TABLET ORAL EVERY 4 HOURS PRN
Status: DISCONTINUED | OUTPATIENT
Start: 2018-10-09 | End: 2018-10-11 | Stop reason: HOSPADM

## 2018-10-09 RX ORDER — ASPIRIN 81 MG/1
81 TABLET ORAL DAILY
Status: DISCONTINUED | OUTPATIENT
Start: 2018-10-09 | End: 2018-10-11 | Stop reason: HOSPADM

## 2018-10-09 RX ORDER — METOPROLOL SUCCINATE 25 MG/1
25 TABLET, EXTENDED RELEASE ORAL DAILY
Status: DISCONTINUED | OUTPATIENT
Start: 2018-10-09 | End: 2018-10-11 | Stop reason: HOSPADM

## 2018-10-09 RX ORDER — DOCUSATE SODIUM 100 MG/1
100 CAPSULE, LIQUID FILLED ORAL 2 TIMES DAILY
Status: DISCONTINUED | OUTPATIENT
Start: 2018-10-09 | End: 2018-10-11 | Stop reason: HOSPADM

## 2018-10-09 RX ORDER — CLOPIDOGREL BISULFATE 75 MG/1
75 TABLET ORAL DAILY
Status: DISCONTINUED | OUTPATIENT
Start: 2018-10-09 | End: 2018-10-11 | Stop reason: HOSPADM

## 2018-10-09 RX ORDER — GLYCOPYRROLATE 0.2 MG/ML
INJECTION INTRAMUSCULAR; INTRAVENOUS AS NEEDED
Status: DISCONTINUED | OUTPATIENT
Start: 2018-10-09 | End: 2018-10-09 | Stop reason: SURG

## 2018-10-09 RX ORDER — SODIUM CHLORIDE 9 MG/ML
INJECTION, SOLUTION INTRAVENOUS CONTINUOUS PRN
Status: DISCONTINUED | OUTPATIENT
Start: 2018-10-09 | End: 2018-10-09 | Stop reason: SURG

## 2018-10-09 RX ORDER — MIDAZOLAM HYDROCHLORIDE 1 MG/ML
INJECTION INTRAMUSCULAR; INTRAVENOUS AS NEEDED
Status: DISCONTINUED | OUTPATIENT
Start: 2018-10-09 | End: 2018-10-09 | Stop reason: SURG

## 2018-10-09 RX ORDER — PROPOFOL 10 MG/ML
INJECTION, EMULSION INTRAVENOUS CONTINUOUS PRN
Status: DISCONTINUED | OUTPATIENT
Start: 2018-10-09 | End: 2018-10-09 | Stop reason: SURG

## 2018-10-09 RX ORDER — FENTANYL CITRATE 50 UG/ML
INJECTION, SOLUTION INTRAMUSCULAR; INTRAVENOUS AS NEEDED
Status: DISCONTINUED | OUTPATIENT
Start: 2018-10-09 | End: 2018-10-09 | Stop reason: SURG

## 2018-10-09 RX ORDER — CALCIUM CHLORIDE 100 MG/ML
1 INJECTION INTRAVENOUS; INTRAVENTRICULAR ONCE
Status: DISCONTINUED | OUTPATIENT
Start: 2018-10-09 | End: 2018-10-10

## 2018-10-09 RX ORDER — CHLORHEXIDINE GLUCONATE 0.12 MG/ML
15 RINSE ORAL 2 TIMES DAILY
Status: DISCONTINUED | OUTPATIENT
Start: 2018-10-09 | End: 2018-10-10

## 2018-10-09 RX ORDER — ROCURONIUM BROMIDE 10 MG/ML
INJECTION, SOLUTION INTRAVENOUS AS NEEDED
Status: DISCONTINUED | OUTPATIENT
Start: 2018-10-09 | End: 2018-10-09 | Stop reason: SURG

## 2018-10-09 RX ORDER — BENZONATATE 100 MG/1
200 CAPSULE ORAL 3 TIMES DAILY PRN
Status: DISCONTINUED | OUTPATIENT
Start: 2018-10-09 | End: 2018-10-11 | Stop reason: HOSPADM

## 2018-10-09 RX ORDER — PROTAMINE SULFATE 10 MG/ML
INJECTION, SOLUTION INTRAVENOUS AS NEEDED
Status: DISCONTINUED | OUTPATIENT
Start: 2018-10-09 | End: 2018-10-09 | Stop reason: SURG

## 2018-10-09 RX ADMIN — METOPROLOL TARTRATE 12.5 MG: 25 TABLET ORAL at 10:55

## 2018-10-09 RX ADMIN — FENTANYL CITRATE 50 MCG: 50 INJECTION, SOLUTION INTRAMUSCULAR; INTRAVENOUS at 16:20

## 2018-10-09 RX ADMIN — SODIUM CHLORIDE 5 MG/HR: 0.9 INJECTION, SOLUTION INTRAVENOUS at 21:09

## 2018-10-09 RX ADMIN — GLYCOPYRROLATE 0.6 MG: 0.2 INJECTION, SOLUTION INTRAMUSCULAR; INTRAVENOUS at 15:31

## 2018-10-09 RX ADMIN — CHLORHEXIDINE GLUCONATE 15 ML: 1.2 RINSE ORAL at 18:00

## 2018-10-09 RX ADMIN — IOHEXOL 28 ML: 350 INJECTION, SOLUTION INTRAVENOUS at 15:29

## 2018-10-09 RX ADMIN — HEPARIN SODIUM 20000 UNITS: 1000 INJECTION INTRAVENOUS; SUBCUTANEOUS at 15:07

## 2018-10-09 RX ADMIN — NEOSTIGMINE METHYLSULFATE 3 MG: 1 INJECTION, SOLUTION INTRAMUSCULAR; INTRAVENOUS; SUBCUTANEOUS at 15:31

## 2018-10-09 RX ADMIN — CHLORHEXIDINE GLUCONATE 15 ML: 1.2 RINSE ORAL at 10:30

## 2018-10-09 RX ADMIN — FENTANYL CITRATE 50 MCG: 50 INJECTION, SOLUTION INTRAMUSCULAR; INTRAVENOUS at 15:08

## 2018-10-09 RX ADMIN — MUPIROCIN 1 APPLICATION: 20 OINTMENT TOPICAL at 10:30

## 2018-10-09 RX ADMIN — CEFAZOLIN 2000 MG: 1 INJECTION, POWDER, FOR SOLUTION INTRAVENOUS at 14:40

## 2018-10-09 RX ADMIN — PROPOFOL 100 MG: 10 INJECTION, EMULSION INTRAVENOUS at 14:24

## 2018-10-09 RX ADMIN — SODIUM CHLORIDE: 0.9 INJECTION, SOLUTION INTRAVENOUS at 14:05

## 2018-10-09 RX ADMIN — LIDOCAINE HYDROCHLORIDE 50 MG: 10 INJECTION, SOLUTION INFILTRATION; PERINEURAL at 14:24

## 2018-10-09 RX ADMIN — SODIUM CHLORIDE 5 MG/HR: 0.9 INJECTION, SOLUTION INTRAVENOUS at 16:28

## 2018-10-09 RX ADMIN — PROPOFOL 50 MCG/KG/MIN: 10 INJECTION, EMULSION INTRAVENOUS at 15:26

## 2018-10-09 RX ADMIN — FENTANYL CITRATE 50 MCG: 50 INJECTION, SOLUTION INTRAMUSCULAR; INTRAVENOUS at 14:45

## 2018-10-09 RX ADMIN — ROCURONIUM BROMIDE 50 MG: 10 INJECTION INTRAVENOUS at 14:24

## 2018-10-09 RX ADMIN — SODIUM CHLORIDE, SODIUM GLUCONATE, SODIUM ACETATE, POTASSIUM CHLORIDE, MAGNESIUM CHLORIDE, SODIUM PHOSPHATE, DIBASIC, AND POTASSIUM PHOSPHATE 50 ML/HR: .53; .5; .37; .037; .03; .012; .00082 INJECTION, SOLUTION INTRAVENOUS at 16:38

## 2018-10-09 RX ADMIN — PROTAMINE SULFATE 140 MG: 10 INJECTION, SOLUTION INTRAVENOUS at 15:20

## 2018-10-09 RX ADMIN — ONDANSETRON 4 MG: 2 INJECTION INTRAMUSCULAR; INTRAVENOUS at 15:31

## 2018-10-09 RX ADMIN — MUPIROCIN 1 APPLICATION: 20 OINTMENT TOPICAL at 21:20

## 2018-10-09 RX ADMIN — MIDAZOLAM 2 MG: 1 INJECTION INTRAMUSCULAR; INTRAVENOUS at 14:05

## 2018-10-09 RX ADMIN — FENTANYL CITRATE 100 MCG: 50 INJECTION, SOLUTION INTRAMUSCULAR; INTRAVENOUS at 14:22

## 2018-10-09 RX ADMIN — FENTANYL CITRATE 50 MCG: 50 INJECTION, SOLUTION INTRAMUSCULAR; INTRAVENOUS at 14:09

## 2018-10-09 NOTE — ANESTHESIA POSTPROCEDURE EVALUATION
Post-Op Assessment Note      CV Status:  Stable    Mental Status:  Unresponsive    Hydration Status:  Euvolemic    PONV Controlled:  Controlled    Airway Patency:  Patent  Airway: intubated    Post Op Vitals Reviewed: Yes          Staff: CRNA       Comments: patient transferred to ICU intubated, vented; VSS; reprot to ICU SHARI MCKINNEY          BP   103/44   Temp 97 9   Pulse 52   Resp 14   SpO2 100

## 2018-10-09 NOTE — ANESTHESIA PREPROCEDURE EVALUATION
Review of Systems/Medical History  Patient summary reviewed  Chart reviewed  No history of anesthetic complications     Cardiovascular  EKG reviewed, Hyperlipidemia, Hypertension , Valvular heart disease , aortic valve stenosis and aortic insufficiency, CHF ,   Comment: LEFT VENTRICLE: Size was normal  Systolic function was normal  Ejection fraction was estimated in the range of 55 % to 65 %  There were no regional wall motion abnormalities  There was mild concentric hypertrophy  DOPPLER: Doppler  parameters were consistent with abnormal left ventricular relaxation (grade 1 diastolic dysfunction)      RIGHT VENTRICLE: The size was normal  Systolic function was normal  Wall thickness was normal      LEFT ATRIUM: The atrium was mildly dilated      RIGHT ATRIUM: Size was normal      MITRAL VALVE: There was mild annular calcification  Valve structure was normal  There was normal leaflet separation  DOPPLER: The transmitral velocity was within the normal range  There was no evidence for stenosis  There was no  regurgitation      AORTIC VALVE: The valve was not visualized well enough to rule out a bicuspid morphology  Leaflets exhibited marked calcification and markedly reduced cuspal separation  DOPPLER: Transaortic velocity was increased due to valvular stenosis  There was moderate to severe stenosis  There was mild regurgitation      TRICUSPID VALVE: The valve structure was normal  There was normal leaflet separation  DOPPLER: The transtricuspid velocity was within the normal range  There was no evidence for stenosis  There was mild regurgitation  Estimated peak PA  pressure was 40 mmHg      PULMONIC VALVE: Leaflets exhibited normal thickness, no calcification, and normal cuspal separation  DOPPLER: The transpulmonic velocity was within the normal range  There was no regurgitation      PERICARDIUM: There was no pericardial effusion   The pericardium was normal in appearance      AORTA: The root exhibited normal size      SYSTEMIC VEINS: IVC: The inferior vena cava was normal in size and course   Respirophasic changes were normal    , Pulmonary hypertension Pulmonary  No pneumonia, COPD , Shortness of breath, Sleep apnea CPAP,        GI/Hepatic    GERD ,             Endo/Other  History of thyroid disease , hypothyroidism,   Obesity  super morbid obesity   GYN       Hematology  Anemia ,     Musculoskeletal    Arthritis     Neurology   Psychology           Physical Exam    Airway    Mallampati score: III  TM Distance: >3 FB  Neck ROM: full     Dental   Comment: Very poor missing several, several chipped,     Cardiovascular  Murmur, Cardiovascular exam normal    Pulmonary  Pulmonary exam normal Breath sounds clear to auscultation,     Other Findings      Current Facility-Administered Medications on File Prior to Visit   Medication Dose Route Frequency Provider Last Rate Last Dose    ceFAZolin (ANCEF) IVPB (premix) 2,000 mg  2,000 mg Intravenous Once [employer], CRNP        [COMPLETED] chlorhexidine (PERIDEX) 0 12 % oral rinse 15 mL  15 mL Mouth/Throat Once [employer], CRNP   15 mL at 10/09/18 1030    insulin regular (HumuLIN R,NovoLIN R) 100 Units in sodium chloride 0 9 % 100 mL infusion   Intravenous Titrated Renzo Lopez DO        [COMPLETED] metoprolol tartrate (LOPRESSOR) partial tablet 12 5 mg  12 5 mg Oral Once [employer], CRNP   12 5 mg at 10/09/18 1055    [COMPLETED] mupirocin (BACTROBAN) 2 % nasal ointment 1 application  1 application Nasal Once [employer], CRNP   1 application at 88/03/83 1030     Current Outpatient Prescriptions on File Prior to Visit   Medication Sig Dispense Refill    aspirin (ECOTRIN LOW STRENGTH) 81 mg EC tablet Take 1 tablet by mouth daily 30 tablet 0    b complex vitamins capsule Take 1 capsule by mouth 2 (two) times a day        benzonatate (TESSALON) 200 MG capsule Take 1 capsule (200 mg total) by mouth 3 (three) times a day as needed for cough 20 capsule 0    Calcium Carb-Cholecalciferol (CALCIUM 600 + D PO) Take 1 tablet by mouth 2 (two) times a day      Fesoterodine Fumarate ER (TOVIAZ) 8 MG TB24 Take 8 mg by mouth daily      furosemide (LASIX) 20 mg tablet Take 1 tablet (20 mg total) by mouth daily 30 tablet 2    levothyroxine 50 mcg tablet Take 50 mcg by mouth daily      loratadine (CLARITIN) 10 mg tablet Take 10 mg by mouth daily      metoprolol succinate (TOPROL-XL) 25 mg 24 hr tablet Take 1 tablet (25 mg total) by mouth daily 90 tablet 1    omeprazole (PriLOSEC) 40 MG capsule Take 40 mg by mouth 2 (two) times a day  1    polyethylene glycol-electrolytes (NULYTELY) 4000 mL solution Take 4,000 mL by mouth once for 1 dose 4000 mL 0    potassium chloride (K-DUR,KLOR-CON) 10 mEq tablet Take 1 tablet (10 mEq total) by mouth 2 (two) times a day 30 tablet 2    sertraline (ZOLOFT) 50 mg tablet Take 50 mg by mouth daily      simvastatin (ZOCOR) 40 mg tablet Take 40 mg by mouth daily at bedtime      temazepam (RESTORIL) 15 mg capsule Take 15 mg by mouth daily  1       Lab Results   Component Value Date    WBC 8 75 08/15/2018    HGB 9 8 (L) 08/15/2018     (H) 08/15/2018     Lab Results   Component Value Date     08/29/2018    K 3 6 08/29/2018    BUN 24 08/29/2018    CREATININE 0 90 08/29/2018     Lab Results   Component Value Date    PTT 31 12/02/2017      Lab Results   Component Value Date    INR 1 03 09/27/2018         Lab Results   Component Value Date    HGBA1C 5 7 09/27/2018         Anesthesia Plan  ASA Score- 4     Anesthesia Type- general with ASA Monitors  Additional Monitors: arterial line and central venous line  Airway Plan: ETT  Comment: General anesthesia, endotracheal Intubation  Standard ASA monitors  Large bore peripheral IV  Pre-induction arterial line  GARRISON for perioperative monitoring and diagnosis  Risks and benefits discussed with patient; patient consented and agrees to proceed            Plan Factors- Patient instructed to abstain from smoking on day of procedure       Induction- intravenous  Postoperative Plan-     Informed Consent- Anesthetic plan and risks discussed with patient  I personally reviewed this patient with the CRNA  Discussed and agreed on the Anesthesia Plan with the CRNA         Lab Results   Component Value Date    WBC 8 75 08/15/2018    HGB 9 8 (L) 08/15/2018    HCT 33 2 (L) 08/15/2018    MCV 79 (L) 08/15/2018     (H) 08/15/2018     Lab Results   Component Value Date    CALCIUM 10 4 (H) 08/29/2018     08/29/2018    K 3 6 08/29/2018    CO2 34 (H) 08/29/2018     08/29/2018    BUN 24 08/29/2018    CREATININE 0 90 08/29/2018     Lab Results   Component Value Date    INR 1 03 09/27/2018    INR 0 98 12/02/2017    PROTIME 13 4 09/27/2018    PROTIME 13 2 12/02/2017     Lab Results   Component Value Date    PTT 31 12/02/2017

## 2018-10-09 NOTE — ANESTHESIA PROCEDURE NOTES
Arterial Line Insertion  Date/Time: 10/9/2018 2:20 PM  Performed by: Erasmo Prasad  Authorized by: Erasmo Prasad   Consent: Verbal consent obtained  Written consent obtained  Risks and benefits: risks, benefits and alternatives were discussed  Consent given by: patient  Patient understanding: patient states understanding of the procedure being performed  Patient consent: the patient's understanding of the procedure matches consent given  Test results: test results available and properly labeled  Required items: required blood products, implants, devices, and special equipment available  Patient identity confirmed: arm band and verbally with patient  Time out: Immediately prior to procedure a "time out" was called to verify the correct patient, procedure, equipment, support staff and site/side marked as required  Preparation: Patient was prepped and draped in the usual sterile fashion    Indications: multiple ABGs and hemodynamic monitoring  Orientation:  Right  Location: radial artery  Anesthesia: local infiltration  Local Anesthetic: lidocaine 1% without epinephrine  Anesthetic total: 1 mL    Sedation:  Patient sedated: no    Procedure Details:  Damaso's test normal: yes  Needle gauge: 20  Seldinger technique: Seldinger technique used  Number of attempts: 2    Post-procedure:  Post-procedure: dressing applied  Waveform: good waveform and waveform confirmed  Post-procedure CNS: normal and unchanged  Patient tolerance: Patient tolerated the procedure well with no immediate complications  Comments: Pre-induction  [id number]

## 2018-10-09 NOTE — ANESTHESIA PROCEDURE NOTES
Central Line Insertion  Performed by: Leann Richards  Authorized by: Leann Richards   Date/Time: 10/9/2018 2:30 PM  Catheter Type:  triple lumen  Consent: Verbal consent obtained  Written consent obtained  Risks and benefits: risks, benefits and alternatives were discussed  Consent given by: patient  Patient understanding: patient states understanding of the procedure being performed  Patient consent: the patient's understanding of the procedure matches consent given  Required items: required blood products, implants, devices, and special equipment available  Patient identity confirmed: arm band and verbally with patient  Time out: Immediately prior to procedure a "time out" was called to verify the correct patient, procedure, equipment, support staff and site/side marked as required  Indications: vascular access  Location details: right internal jugular  Catheter size: 7 Fr  Patient position: Trendelenburg  Anesthesia: see MAR for details    Anesthesia:  Local anesthetic: ga      Sedation:  Patient sedated: no  Assessment: blood return through all ports and free fluid flow  Preparation: skin prepped with ChloraPrep  Skin prep agent dried: skin prep agent completely dried prior to procedure  Sterile barriers: all five maximum sterile barriers used - cap, mask, sterile gown, sterile gloves, and large sterile sheet  Hand hygiene: hand hygiene performed prior to central venous catheter insertion  Ultrasound guidance: yes  sterile gel and probe cover used in ultrasound-guided central venous catheter insertionSite selection rationale: tvar  Pre-procedure: landmarks identified  Vessel of Catheter Tip End: central venous  Number of attempts: 1  Successful placement: yes  Post-procedure: chlorhexidine patch applied,  dressing applied and line sutured  Patient tolerance: Patient tolerated the procedure well with no immediate complications

## 2018-10-09 NOTE — DISCHARGE INSTRUCTIONS
Transcatheter Aortic Valve Replacement   WHAT YOU NEED TO KNOW:   · A TAVR is a procedure to replace your aortic valve  It is done without removing your old valve  The aortic valve is between the left ventricle and the aorta  The left ventricle is the lower left chamber of your heart  The aorta is a blood vessel that pumps blood to your brain and body  The aortic valve opens and closes to let blood flow from your heart to the rest of your body  · TAVR may be used to replace your aortic valve if open heart surgery is not safe for you  Your valve will be replaced with a tissue valve  The tissue may be taken from an animal, such as a pig or cow  DISCHARGE INSTRUCTIONS:   Call 911 for any of the following:   · You have any of the following signs of a heart attack:      ¨ Squeezing, pressure, or pain in your chest that lasts longer than 5 minutes or returns    ¨ Discomfort or pain in your back, neck, jaw, stomach, or arm     ¨ Trouble breathing    ¨ Nausea or vomiting    ¨ Lightheadedness or a sudden cold sweat, especially with chest pain or trouble breathing    · You have any of the following signs of a stroke:      ¨ Numbness or drooping on one side of your face     ¨ Weakness in an arm or leg    ¨ Confusion or difficulty speaking    ¨ Dizziness, a severe headache, or vision loss    · You feel lightheaded, short of breath, and have chest pain  · You cough up blood  · You have trouble breathing  Seek care immediately if:   · Blood soaks through your bandage  · Your stitches come apart  · Your wound gets swollen quickly  · Your heart is beating faster or slower than usual      · Your arm or leg feels numb, cool, or looks pale  · Your arm or leg feels warm, tender, and painful  It may look swollen and red  · You feel weak or dizzy  Contact your healthcare provider if:   · You have a fever or chills  · Your wound is red, swollen, or draining pus      · Your wound looks more bruised or there is new bruising near your wound  · You have nausea or are vomiting  · Your skin is itchy, swollen, or you have a rash  · You have questions or concerns about your condition or care  Medicines: You may need any of the following:  · Blood thinners    help prevent blood clots  Examples of blood thinners include heparin and warfarin  Clots can cause strokes, heart attacks, and death  The following are general safety guidelines to follow while you are taking a blood thinner:    ¨ Watch for bleeding and bruising while you take blood thinners  Watch for bleeding from your gums or nose  Watch for blood in your urine and bowel movements  Use a soft washcloth on your skin, and a soft toothbrush to brush your teeth  This can keep your skin and gums from bleeding  If you shave, use an electric shaver  Do not play contact sports  ¨ Tell your dentist and other healthcare providers that you take anticoagulants  Wear a bracelet or necklace that says you take this medicine  ¨ Do not start or stop any medicines unless your healthcare provider tells you to  Many medicines cannot be used with blood thinners  ¨ Tell your healthcare provider right away if you forget to take the medicine, or if you take too much  ¨ Warfarin  is a blood thinner that you may need to take  The following are things you should be aware of if you take warfarin  § Foods and medicines can affect the amount of warfarin in your blood  Do not make major changes to your diet while you take warfarin  Warfarin works best when you eat about the same amount of vitamin K every day  Vitamin K is found in green leafy vegetables and certain other foods  Ask for more information about what to eat when you are taking warfarin  § You will need to see your healthcare provider for follow-up visits when you are on warfarin  You will need regular blood tests  These tests are used to decide how much medicine you need      · Antiplatelets , such as aspirin, help prevent blood clots  Take your antiplatelet medicine exactly as directed  These medicines make it more likely for you to bleed or bruise  If you are told to take aspirin, do not take acetaminophen or ibuprofen instead  · Acetaminophen  helps decrease your pain  This medicine is available without a doctor's order  Ask how much medicine is safe to take, and how often to take it  Acetaminophen can cause liver damage if not taken correctly  · Take your medicine as directed  Contact your healthcare provider if you think your medicine is not helping or if you have side effects  Tell him or her if you are allergic to any medicine  Keep a list of the medicines, vitamins, and herbs you take  Include the amounts, and when and why you take them  Bring the list or the pill bottles to follow-up visits  Carry your medicine list with you in case of an emergency  Care for your wound as directed:  Ask your healthcare provider when your wound can get wet  Carefully wash around the wound with soap and water  Do not scrub your wound  You can let soap and water gently run over your wound  Gently pat dry the area and put on new, clean bandages as directed  Check your wound every day for signs of infection such as swelling, pus, or redness  Change your bandages when they get wet or dirty  Do not put lotions or powders on your wound  Do not take a bath or swim until your healthcare provider says it is okay  These activities can increase your risk for a wound infection  Activity:  Do not lift anything heavier than 5 pounds or do vigorous activities such as sports  These actions will put too much stress on your wound and heart  Take short walks around the house several times a day  This will help prevent blood clots  Ask your healthcare provider what other activities are safe for you to do  Also ask when you can return to your normal activities and work or school  Self-care:   · Eat heart healthy foods    You may need to eat foods that are low in salt, fat, or cholesterol  Healthy foods include fruits, vegetables, whole-grain breads, low-fat dairy products, beans, lean meats, and fish  Ask your healthcare provider for more information about a heart healthy diet  · Do not smoke  Nicotine and other chemicals in cigarettes and cigars can cause heart and lung damage  Nicotine can also slow healing  Ask your healthcare provider for information if you currently smoke and need help to quit  E-cigarettes or smokeless tobacco still contain nicotine  Talk to your healthcare provider before you use these products  · Do not drink alcohol  Ask your cardiologist if it is safe for you to drink alcohol  Alcohol can increase your risk for high blood pressure, diabetes, and coronary artery disease  · Maintain a healthy weight  Ask your healthcare provider how much you should weigh  Extra weight can increase the stress on your heart  Ask him to help you create a weight loss plan if you are overweight  · Exercise as directed after you recover  Your healthcare provider can help you create an exercise plan that is right for you  Exercise will help keep your heart healthy  Ask your healthcare provider if you need antibiotics before procedures:  Some procedures may allow bacteria to get into your blood and travel to your heart  This can cause an infection in your heart and prevent you from healing  You may need antibiotics before certain procedures that happen in the next 6 months to prevent infection  This may also include certain dental procedures  Ask your healthcare provider for more information  Follow up with your healthcare provider as directed: You may need to return for tests  These tests will make sure your valve is working correctly  Write down your questions so you remember to ask them during your visits    © 2017 [employer] Information is for End User's use only and may not be sold, redistributed or otherwise used for commercial purposes  All illustrations and images included in CareNotes® are the copyrighted property of [employer]  or [employer]chel [employer]  The above information is an  only  It is not intended as medical advice for individual conditions or treatments  Talk to your doctor, nurse or pharmacist before following any medical regimen to see if it is safe and effective for you  Heart Healthy Diet   WHAT YOU NEED TO KNOW:   What is a heart healthy diet? A heart healthy diet is an eating plan low in total fat, unhealthy fats, and sodium (salt)  A heart healthy diet helps decrease your risk for heart disease and stroke  Limit the amount of fat you eat to 25% to 35% of your total daily calories  Limit sodium to less than 2,300 mg each day  What is healthy fat, and where is it found? Healthy fats can help improve cholesterol levels  The risk for heart disease is decreased when cholesterol levels are normal  Choose healthy fats, such as the following:  · Unsaturated fat  is found in foods such as soybean, canola, olive, corn, and safflower oils  It is also found in soft tub margarine that is made with liquid vegetable oil  · Omega-3 fat  is found in certain fish, such as salmon, tuna, and trout, and in walnuts and flaxseed  What is unhealthy fat, and where is it found? Unhealthy fats can cause unhealthy cholesterol levels in your blood and increase your risk of heart disease  Limit unhealthy fats, such as the following:  · Cholesterol  is found in animal foods, such as eggs and lobster, and in dairy products made from whole milk  Limit cholesterol to less than 300 milligrams (mg) each day  You may need to limit cholesterol to 200 mg each day if you have heart disease  · Saturated fat  is found in meats, such as tariq and hamburger  It is also found in chicken or turkey skin, whole milk, and butter  Limit saturated fat to less than 7% of your total daily calories  Limit saturated fat to less than 6% if you have heart disease or are at increased risk for it  · Trans fat  is found in packaged foods, such as potato chips and cookies  It is also in hard margarine, some fried foods, and shortening  Avoid trans fats as much as possible  What can I eat and drink on a heart healthy diet? Ask your dietitian or healthcare provider how many servings to have from each of the following food groups:  · Grains:      ¨ Whole-wheat breads, cereals, and pastas, and brown rice    ¨ Low-fat, low-sodium crackers and chips    · Vegetables:      ¨ Broccoli, green beans, green peas, and spinach    ¨ Collards, kale, and lima beans    ¨ Carrots, sweet potatoes, tomatoes, and peppers    ¨ Canned vegetables with no salt added    · Fruits:      ¨ Bananas, peaches, pears, and pineapple    ¨ Grapes, raisins, and dates    ¨ Oranges, tangerines, grapefruit, orange juice, and grapefruit juice    ¨ Apricots, mangoes, melons, and papaya    ¨ Raspberries and strawberries    ¨ Canned fruit with no added sugar    · Low-fat dairy products:      ¨ Nonfat (skim) milk, 1% milk, and low-fat almond, cashew, or soy milks fortified with calcium    ¨ Low-fat cheese, regular or frozen yogurt, and cottage cheese    · Meats and proteins , such as lean cuts of beef and pork (loin, leg, round), skinless chicken and turkey, legumes, soy products, egg whites, and nuts  Which foods and drinks do I need to limit or avoid?   Ask your dietitian or healthcare provider about these and other foods that are high in unhealthy fat, sodium, and sugar:  · Snack or packaged foods , such as frozen dinners, cookies, macaroni and cheese, and cereals with more than 300 mg of sodium per serving    · Canned or dry mixes  for cakes, soups, sauces, or gravies    · Vegetables with added sodium , such as instant potatoes, vegetables with added sauces, or regular canned vegetables    · Other foods high in sodium , such as ketchup, barbecue sauce, salad dressing, pickles, olives, soy sauce, and miso    · High-fat dairy foods  such as whole or 2% milk, cream cheese, or sour cream, and cheeses     · High-fat protein foods  such as high-fat cuts of beef (T-bone steaks, ribs), chicken or turkey with skin, and organ meats, such as liver    · Cured or smoked meats , such as hot dogs, tariq, and sausage    · Unhealthy fats and oils , such as butter, stick margarine, shortening, and cooking oils such as coconut or palm oil    · Food and drinks high in sugar , such as soft drinks (soda), sports drinks, sweetened tea, candy, cake, cookies, pies, and doughnuts  What other diet guidelines should I follow? · Eat more foods containing omega-3 fats  Eat fish high in omega-3 fats at least 2 times a week  · Limit alcohol  Too much alcohol can damage your heart and raise your blood pressure  Women should limit alcohol to 1 drink a day  Men should limit alcohol to 2 drinks a day  A drink of alcohol is 12 ounces of beer, 5 ounces of wine, or 1½ ounces of liquor  · Choose low-sodium foods  High-sodium foods can lead to high blood pressure  Add little or no salt to food you prepare  Use herbs and spices in place of salt  · Eat more fiber  to help lower cholesterol levels  Eat at least 5 servings of fruits and vegetables each day  Eat 3 ounces of whole-grain foods each day  Legumes (beans) are also a good source of fiber  What lifestyle guidelines should I follow? · Do not smoke  Nicotine and other chemicals in cigarettes and cigars can cause lung and heart damage  Ask your healthcare provider for information if you currently smoke and need help to quit  E-cigarettes or smokeless tobacco still contain nicotine  Talk to your healthcare provider before you use these products  · Exercise regularly  to help you maintain a healthy weight and improve your blood pressure and cholesterol levels  Ask your healthcare provider about the best exercise plan for you   Do not start an exercise program without asking your healthcare provider  CARE AGREEMENT:   You have the right to help plan your care  Discuss treatment options with your caregivers to decide what care you want to receive  You always have the right to refuse treatment  The above information is an  only  It is not intended as medical advice for individual conditions or treatments  Talk to your doctor, nurse or pharmacist before following any medical regimen to see if it is safe and effective for you  © 2017 [address] [facility] Information is for End User's use only and may not be sold, redistributed or otherwise used for commercial purposes  All illustrations and images included in CareNotes® are the copyrighted property of [employer]  or Mitchel Anthony

## 2018-10-09 NOTE — H&P (VIEW-ONLY)
History & Physical - Cardiothoracic Surgery   Tom Silva 65 y o  female MRN: [id number]    Physician Requesting Consult: Dr Femi Payan    Reason for Consult / Principal Problem: Aortic stenosis, Non-Rheumatic    History of Present Illness: Tom Silva is a 65y o  year old female who was previously evaluated in our office by CHAVEZ Mclaughlin  for transcatheter aortic valve replacement  During this initial consultation, arrangements were made for the following studies to be completed: Gated CTA of the chest/abdomen/pelvis, 3D GARRISON, left and right cardiac catheterization, dental clearance, pulmonary function tests with ABG, and carotid artery ultrasound  Tom Silva now presents to review the results of these tests and obtain a second surgeon to confirm the suitability of proceeding with transcatheter aortic valve replacment  Des Melendez was referred to our office after being hospitalized for acute exacerbation of CHF  She was evaluated with cardiac catheterization and echocardiogram which demonstrated severe aortic stenosis  She was stabilized and discharged home on supplemental oxygen and diuretic therapy  She admits to exertional dyspnea with minimal activity  She denies chest pain, presyncope/syncope, palpitations, PND or orthopnea  She admits to lower extremity edema which is improved with diuretic therapy  She also has recently been in the process of work up for anemia   She was discovered to have heme positive stool and will be seen post op by a GI specialist      Past Medical History:  Past Medical History:   Diagnosis Date    Anemia 08/22/2018    Aortic valve disease     Arthritis     Cardiac disease     "leaky valve"    CHF (congestive heart failure) (HCC)     Coronary artery disease     CPAP (continuous positive airway pressure) dependence     Disease of thyroid gland     Dislocation of right shoulder joint     Hyperlipidemia     Hypertension     Hypothyroid 08/22/2018    Murmur, cardiac     Obesity, morbid ([facility] [address] ) 08/22/2018    Pulmonary hypertension ([facility] [address] ) 08/22/2018    Shortness of breath     Simple goiter     Skin cyst     within the armpits, right    Sleep apnea          Past Surgical History:   Past Surgical History:   Procedure Laterality Date    BREAST BIOPSY      CARPAL TUNNEL RELEASE      CHOLECYSTECTOMY      DILATION AND CURETTAGE OF UTERUS      HYSTEROSCOPY      JOINT REPLACEMENT      b/l knee     JOINT REPLACEMENT  2007    left hip    IA COLONOSCOPY FLX DX W/COLLJ SPEC WHEN PFRMD N/A 9/6/2018    Procedure: COLONOSCOPY;  Surgeon: Angel Arvizu MD;  Location: [facility]; Service: Gastroenterology    IA ESOPHAGOGASTRODUODENOSCOPY TRANSORAL DIAGNOSTIC N/A 8/31/2018    Procedure: ESOPHAGOGASTRODUODENOSCOPY (EGD); Surgeon: Angel Arvizu MD;  Location: [facility]; Service: Gastroenterology         Family History:  Family History   Problem Relation Age of Onset    Diabetes Mother     Stroke Mother     Cancer Father     Lung cancer Father     Diabetes Sister     Heart disease Sister     Coronary artery disease Family     Diabetes Family     Hypertension Family     Cancer Family     Stroke Family          Social History:    History   Alcohol Use No     History   Drug Use No     History   Smoking Status    Never Smoker   Smokeless Tobacco    Never Used       Home Medications:   Prior to Admission medications    Medication Sig Start Date End Date Taking?  Authorizing Provider   aspirin (ECOTRIN LOW STRENGTH) 81 mg EC tablet Take 1 tablet by mouth daily 10/31/17  Yes Mitchel Avila MD   b complex vitamins capsule Take 1 capsule by mouth 2 (two) times a day     Yes Historical Provider, MD   Calcium Carb-Cholecalciferol (CALCIUM 600 + D PO) Take 1 tablet by mouth 2 (two) times a day   Yes Historical Provider, MD   Fesoterodine Fumarate ER (TOVIAZ) 8 MG TB24 Take 8 mg by mouth daily   Yes Historical Provider, MD   fluticasone (FLONASE) 50 mcg/act nasal spray 1 spray into each nostril daily   Yes Historical Provider, MD   furosemide (LASIX) 40 mg tablet Take 1 tablet (40 mg total) by mouth daily 9/18/18  Yes MABEL Castro   IRON-VITAMIN C PO Take 1 tablet by mouth daily   Yes Historical Provider, MD   levothyroxine 50 mcg tablet Take 50 mcg by mouth daily   Yes Historical Provider, MD   loratadine (CLARITIN) 10 mg tablet Take 10 mg by mouth daily   Yes Historical Provider, MD   losartan (COZAAR) 50 mg tablet Take 0 5 tablets (25 mg total) by mouth daily 8/13/18  Yes MABEL Castro   omeprazole (PriLOSEC) 40 MG capsule Take 40 mg by mouth 2 (two) times a day 6/23/18  Yes Historical Provider, MD   sertraline (ZOLOFT) 50 mg tablet Take 50 mg by mouth daily   Yes Historical Provider, MD   simvastatin (ZOCOR) 40 mg tablet Take 40 mg by mouth daily at bedtime   Yes Historical Provider, MD   temazepam (RESTORIL) 15 mg capsule Take 15 mg by mouth daily 7/2/18  Yes Historical Provider, MD   albuterol (2 5 mg/3 mL) 0 083 % nebulizer solution Take 3 mL by nebulization every 6 (six) hours as needed for wheezing or shortness of breath  Patient not taking: Reported on 9/21/2018  10/30/17   Jelly John MD   mupirocin (BACTROBAN) 2 % ointment Apply 1 application topically 2 (two) times a day for 5 days Apply to each nostril twice daily for five days before your operation  9/21/18 9/26/18  MABEL Ness   polyethylene glycol-electrolytes (NULYTELY) 4000 mL solution Take 4,000 mL by mouth once for 1 dose 8/31/18 8/31/18  Gunnar Bloom MD       Allergies:   Allergies   Allergen Reactions    Latex Rash    Neosporin [Neomycin-Bacitracin Zn-Polymyx] Rash and Other (See Comments)     hives per [facility] order       Review of Systems:  Review of Systems - History obtained from chart review and the patient  General ROS: positive for  - fatigue  negative for - chills or fever  Psychological ROS: negative  Ophthalmic ROS: negative  Hematological and Lymphatic ROS: positive for anemia; negative for - bleeding problems, blood clots or bruising  Respiratory ROS: positive for - shortness of breath  negative for - cough or hemoptysis  Cardiovascular ROS: positive for - dyspnea on exertion, edema and murmur  negative for - chest pain  Gastrointestinal ROS: no abdominal pain, change in bowel habits, or black or bloody stools  Genito-Urinary ROS: no dysuria, trouble voiding, or hematuria  Musculoskeletal ROS: positive for - gait disturbance secondary to b/l TKR's and THR  negative for - muscular weakness  Neurological ROS: no TIA or stroke symptoms    Vital Signs:     Vitals:    09/21/18 1000 09/21/18 1036   BP: 122/56 126/52   BP Location: Left arm Right arm   Cuff Size: Adult    Pulse: 69    Resp: 16    Temp: 97 6 °F (36 4 °C)    TempSrc: Oral    SpO2: 93%    Weight: 99 8 kg (220 lb)    Height: 4' 7" (1 397 m)        Physical Exam:    General: morbidly obese, no acute distress  HEENT/NECK:  PERRLA  No jugular venous distention  Cardiac:Regular rate and rhythm, III/VI harsh systolic murmur RUSB  Carotids: 1+ pulses, delayed upstrokes, no bruits  Pulmonary:  Breath sounds clear bilaterally  Abdomen:  Non-tender, Non-distended  Positive bowel sounds  Upper extremities: 2+ radial pulses; brisk capillary refill; right hand dominant  Lower extremities: Extremities warm/dry  PT/DP pulses 1+ bilaterally  1+ edema B/L  Neuro: Alert and oriented X 3  Sensation is grossly intact  No focal deficits  Musculoskeletal: MAEE  Ambulates with walker  Skin: Warm/Dry, without rashes or lesions      Lab Results:   Lab Results   Component Value Date    WBC 8 75 08/15/2018    HGB 9 8 (L) 08/15/2018    HCT 33 2 (L) 08/15/2018    MCV 79 (L) 08/15/2018     (H) 08/15/2018     Lab Results   Component Value Date    CALCIUM 10 4 (H) 08/29/2018     08/29/2018    K 3 6 08/29/2018    CO2 34 (H) 08/29/2018     08/29/2018    BUN 24 08/29/2018 CREATININE 0 90 08/29/2018     No results found for: CHOL  Lab Results   Component Value Date    HDL 59 08/15/2018    HDL 65 (H) 06/05/2018     Lab Results   Component Value Date    LDLCALC 72 08/15/2018    LDLCALC 51 06/05/2018     Lab Results   Component Value Date    TRIG 112 08/15/2018    TRIG 83 06/05/2018     No components found for: CHOLHDL      No results found for: HGBA1C  Lab Results   Component Value Date    TROPONINI <0 02 08/14/2018       Imaging Studies:     Gated CTA of the chest/abdomen/pelvis:   VASCULAR STRUCTURES:       Annulus: diameter 25 x 22 mm      area: 430 sq mm    Annulus to LCA: 12 mm    Annulus to RCA: 13 mm    Minimal diameter right iliofemoral segment: 7 8 mm    Minimal diameter left iliofemoral segment: 7 9 mm    3D GARRISON:   SUMMARY     LEFT VENTRICLE:  Systolic function was normal  Ejection fraction was estimated to be 60 %  There were no regional wall motion abnormalities      LEFT ATRIUM:  The atrium was dilated      ATRIAL SEPTUM:  There was increased thickness of the septum, consistent with lipomatous hypertrophy  No defect or patent foramen ovale was identified by color doppler      MITRAL VALVE:  There was mild to moderate annular calcification  There was mild regurgitation      AORTIC VALVE:  LVOT and annular dimentions: Annular area 4 14 cm2; annular perimeter 7 31 cm; transverse diameter 2 31 cm; anteroposterior diameter 2 12 cm; LVOT 1 9 cm; aortic root 2 56cm, sinotubular junction 2 18 cm; ascending aorta 3 34cm  Transaortic velocity was increased due to valvular stenosis  There was severe stenosis  There was mild regurgitation  Valve mean gradient was 38 mmHg  Estimated aortic valve area (by VTI) was 0 71 cm squared      TRICUSPID VALVE:  There was mild regurgitation      AORTA:  There was mild atheroma in the proximal descending aorta  Left and right cardiac catheterization:   CORONARY CIRCULATION:  Left main: Normal   LAD: The vessel was normal sized  Angiography showed minor luminal irregularities  Circumflex: The vessel was small sized  Angiography showed minor luminal irregularities  Ramus intermedius: The vessel was normal sized  Angiography showed minor luminal irregularities  RCA: The vessel was large sized (dominant)  Angiography showed minor luminal irregularities      CARDIAC STRUCTURES:  There was severe aortic stenosis      Pulmonary function tests:   Results:  FEV1/FVC Ratio:  109 %  Forced Vital Capacity:  1 33 L, 72 % predicted  FEV1:  1 13 L, 79 % predicted  After administration of bronchodilator FEV1:  1 37 L, 95% predicted with an appreciable response to the bronchodilator      Lung volumes by body nitrogen wash out : Total Lung Capacity 78 % predicted Residual volume  61 % predicted     DLCO corrected for patients hemoglobin level:  16 % Data set appears appropriate for interpretation except for the DLCO, patient could not give a good effort  Carotid artery ultrasound:   RIGHT:  There is <50% stenosis noted in the internal carotid artery  Plaque is  heterogenous and irregular  Vertebral artery flow is antegrade  There is no significant subclavian artery  disease  LEFT:  There is <50% stenosis noted in the internal carotid artery  Plaque is  heterogenous and irregular  Vertebral artery flow is antegrade  There is no significant subclavian artery  disease  I have personally reviewed pertinent reports        TAVR evaluation Assessment:     5 Meter Walk Test:      Attempt 1: 9 sec   Attempt 2: 9 sec   Attempt 3: 9sec    STS Risk Score: 2 9%        Maribel 122: III    KCCQ-12 completed    Assessment:  Patient Active Problem List    Diagnosis Date Noted    Gastroesophageal reflux disease without esophagitis 08/28/2018    Anemia 08/22/2018    Obesity, morbid ([address] ) 08/22/2018    Shortness of breath 08/14/2018    Chronic diastolic (congestive) heart failure ([address] ) 08/14/2018    Severe aortic stenosis 07/03/2018    Reactive airway disease without complication 46/83/1904    JANICE (obstructive sleep apnea) 01/31/2018    Hyperlipidemia 12/04/2017    Hypertension 12/03/2017    Community acquired pneumonia of left lower lobe of lung ([facility] [address] ) 12/03/2017    GERD (gastroesophageal reflux disease) 10/29/2017    Hypothyroid 65/63/3945    Diastolic dysfunction 87/86/0810    LVH (left ventricular hypertrophy) 09/22/2016    Mitral annular calcification 09/22/2016    Mitral valve stenosis 09/22/2016    [facility] 09/22/2016    Morbid obesity ([facility] ) 06/02/2016     Severe aortic stenosis; Proceed with TAVR     Plan:    Lee Ann Lion has severe symptomatic aortic stenosis  Based on their STS risk assessment, they have undergone testing for transcatheter aortic valve replacement  The results of these studies have been interpreted by two independent cardiac surgeons who have determined the patient to be Intermediate risk for open aortic valve replacement  The risks, benefits, and alternatives to TAVR were discussed in detail with the patient today  They understand and wish to proceed with transfemoral transcatheter aortic valve replacement  Informed consent was obtained and a date for the intervention has been set  Lee Ann Lion was comfortable with our recommendations, and their questions were answered to their satisfaction  The following preoperative instructions were provided at the conclusion of their consultation:     1  You will receive a phone call from the hospital between 2:00 PM and 8:00 PM the day prior to surgery to confirm arrival time and location  For surgery on Mondays, you will receive a call on Friday  2  Do not drink or eat anything after midnight the night before surgery  That includes no water, candy, gum, lozenges, Lifesavers, etc  We recommend you not eat any salty or fatty snack food, consume alcohol or smoke the night before surgery  3  Continue taking aspirin but only 81 mg daily    4  If you take Coumadin and/or Plavix, discontinue it 5 days before surgery  5  If you are diabetic, do not take any of your diabetic pills the morning of surgery  If you take Lantus insulin, you may take it at your regularly scheduled time the day before surgery  Do not take any other insulins the morning of surgery  6  The 2 nights before surgery, take a shower each night using the special antiseptic soap or soap sponges you received from the office or hospital  Marina Cluster your hair with regular shampoo and rinse completely before using the antiseptic sponges  Use the sponge to wash from your neck down, with special attention to the armpits and groin area  Do not use any other soap or cleanser on your skin  Do not use lotions, powder, deodorant or perfume of any kind on your skin after you shower  Use clean bed linens and wear clean pajamas after your shower  7  You will be prescribed Mupirocin nasal ointment  Apply to both nostrils twice a day for 5 days prior to surgery  8  Do not take a shower the morning of her surgery; you'll be given a special" bath" at the hospital   9  Notify the CT surgery office if you develop a cold, sore throat, cough, fever or other health issues before your surgery    10  Other medication changes included the following: stop multivitamin now and stop Losartan 3 days before surgery      SIGNATURE: MABEL Weber  DATE: September 21, 2018  TIME: 11:42 AM

## 2018-10-09 NOTE — CONSULTS
2002 [facility] 65 y o  female MRN: [id number]  Unit/Bed#: [facility] 597-27 Encounter: [id number]      Physician Requesting Consult: Florentino Douglas MD    Reason for Consult / Principal Problem: Severe aortic stenosis    HPI: Jamia Santos is a 64 y/o female with PMHx PAH, aortic stenosis, CHF, JANICE, CAD and anemia who presents s/p TAVR  Patient had known AS complicated by multiple recent CHF exacerbations as well as GALINDO, LE edema, and fatigue prompting cardiac surgery evaluation  She now presents s/p TAVR  PMHx: PAH, aortic stenosis, CHF, JANICE, CAD, anemia    History obtained from chart review due to patient being intubated and sedated  PMH:   Past Medical History:   Diagnosis Date    Anemia 08/22/2018    Arthritis     AVB (atrioventricular block)     first degree    CHF (congestive heart failure) (HCC)     COPD, mild (HCC)     Coronary artery disease     Dislocation of right shoulder joint     Frequent UTI     GERD (gastroesophageal reflux disease)     H/O: pneumonia     Heme positive stool     History of uterine cancer     s/p CALLIE    Hyperlipidemia     Hypertension     Hypothyroidism     Obesity, morbid ([facility] [address] ) 08/22/2018    JANICE on CPAP     Pulmonary hypertension ([facility] [address] ) 08/22/2018    Severe aortic stenosis     Simple goiter     Skin cyst     within the armpits, right       PSH:   Past Surgical History:   Procedure Laterality Date    BREAST BIOPSY      CARDIAC CATHETERIZATION      CARPAL TUNNEL RELEASE Bilateral     CHOLECYSTECTOMY      DILATION AND CURETTAGE OF UTERUS      HYSTEROSCOPY      IA COLONOSCOPY FLX DX W/COLLJ SPEC WHEN PFRMD N/A 9/6/2018    Procedure: COLONOSCOPY;  Surgeon: Patito Estevez MD;  Location: [facility]; Service: Gastroenterology    IA ESOPHAGOGASTRODUODENOSCOPY TRANSORAL DIAGNOSTIC N/A 8/31/2018    Procedure: ESOPHAGOGASTRODUODENOSCOPY (EGD); Surgeon: Patito Estevez MD;  Location: [facility];   Service: Gastroenterology  TOTAL ABDOMINAL HYSTERECTOMY      TOTAL HIP ARTHROPLASTY Left 2007    TOTAL KNEE ARTHROPLASTY Bilateral        Family History:   Family History   Problem Relation Age of Onset    Diabetes Mother     Stroke Mother     Cancer Father     Lung cancer Father     Diabetes Sister     Heart disease Sister     Coronary artery disease Family     Diabetes Family     Hypertension Family     Cancer Family     Stroke Family        Social History:   History   Smoking Status    Never Smoker   Smokeless Tobacco    Never Used      History   Alcohol Use No      Marital Status: Single    ROS: ROS unable to be obtained secondary to intubation and sedation  Allergies: Allergies   Allergen Reactions    Latex Rash    Neosporin [Neomycin-Bacitracin Zn-Polymyx] Rash and Other (See Comments)     hives per [facility] order       Home Medications:   Prior to Admission medications    Medication Sig Start Date End Date Taking?  Authorizing Provider   aspirin (ECOTRIN LOW STRENGTH) 81 mg EC tablet Take 1 tablet by mouth daily 10/31/17  Yes Manfred Chilel MD   b complex vitamins capsule Take 1 capsule by mouth 2 (two) times a day     Yes Historical Provider, MD   benzonatate (TESSALON) 200 MG capsule Take 1 capsule (200 mg total) by mouth 3 (three) times a day as needed for cough 10/2/18  Yes MABEL Castro   Calcium Carb-Cholecalciferol (CALCIUM 600 + D PO) Take 1 tablet by mouth 2 (two) times a day   Yes Historical Provider, MD   Fesoterodine Fumarate ER (TOVIAZ) 8 MG TB24 Take 8 mg by mouth daily   Yes Historical Provider, MD   furosemide (LASIX) 20 mg tablet Take 1 tablet (20 mg total) by mouth daily 9/26/18  Yes Gricelda Moser MD   levothyroxine 50 mcg tablet Take 50 mcg by mouth daily   Yes Historical Provider, MD   loratadine (CLARITIN) 10 mg tablet Take 10 mg by mouth daily   Yes Historical Provider, MD   metoprolol succinate (TOPROL-XL) 25 mg 24 hr tablet Take 1 tablet (25 mg total) by mouth daily 9/26/18  Yes David Mcguire MD   omeprazole (PriLOSEC) 40 MG capsule Take 40 mg by mouth 2 (two) times a day 6/23/18  Yes Historical Provider, MD   potassium chloride (K-DUR,KLOR-CON) 10 mEq tablet Take 1 tablet (10 mEq total) by mouth 2 (two) times a day 9/26/18  Yes David Mcguire MD   sertraline (ZOLOFT) 50 mg tablet Take 50 mg by mouth daily   Yes Historical Provider, MD   simvastatin (ZOCOR) 40 mg tablet Take 40 mg by mouth daily at bedtime   Yes Historical Provider, MD   temazepam (RESTORIL) 15 mg capsule Take 15 mg by mouth daily 7/2/18  Yes Historical Provider, MD   polyethylene glycol-electrolytes (NULYTELY) 4000 mL solution Take 4,000 mL by mouth once for 1 dose 8/31/18 8/31/18  Gabriella Haider MD       Inpatient Medications:  Scheduled Meds:    Current Facility-Administered Medications:  acetaminophen 650 mg Rectal Q4H PRN Marcus Beverly PA-C    acetaminophen 650 mg Oral Q4H PRN Marcus Beverly PA-C    aspirin 81 mg Oral Daily Marcus Beverly PA-C    benzonatate 200 mg Oral TID PRN Marcus Beverly PA-C    bisacodyl 10 mg Rectal Daily PRN Marcus Beverly PA-C    calcium chloride 1 g Intravenous Once Marcus Beverly PA-C    cefazolin 2,000 mg Intravenous Q8H Marcus Beverly PA-C    chlorhexidine 15 mL Swish & Spit BID Marcus Beverly PA-C    clopidogrel 75 mg Oral Daily Marcus Beverly PA-C    docusate sodium 100 mg Oral BID Marcus Beverly PA-C    fentanyl citrate (PF) 50 mcg Intravenous Once Marcus Beverly PA-C    fentanyl citrate (PF) 50 mcg Intravenous Q1H PRN Marcus Beverly PA-C    [START ON 10/10/2018] fondaparinux 2 5 mg Subcutaneous Daily Marcus Beverly PA-C    insulin lispro 1-5 Units Subcutaneous TID AC Amina Jenkins PA-C    insulin lispro 1-5 Units Subcutaneous HS Marcus Beverly PA-C    [START ON 10/10/2018] levothyroxine 50 mcg Oral Daily Marcus Beverly PA-C    loratadine 10 mg Oral Daily Marcus Siria, PA-C    metoprolol succinate 25 mg Oral Daily Amina Troy Bustamante PA-C    multi-electrolyte 50 mL/hr Intravenous Continuous Albertinamonalisa Purvis PA-C Last Rate: 50 mL/hr (10/09/18 1638)   mupirocin 1 application Nasal T12U [address] Albertina Hyun, PA-C    niCARdipine 2 5-15 mg/hr Intravenous Titrated Albertinamonalisa Purvis, PA-C Last Rate: 10 mg/hr (10/09/18 1629)   ondansetron 4 mg Intravenous Q6H PRN Albertina Hyun, PA-C    pantoprazole 40 mg Oral Daily Albertina Hyun, PA-C    polyethylene glycol 17 g Oral Daily Albertina Hyun, PA-C    potassium chloride 20 mEq Intravenous Once PRN Albertina Hyun, PA-C    potassium chloride 20 mEq Intravenous Q1H PRN Albertina Hyun, PA-C    potassium chloride 20 mEq Intravenous Q30 Min PRN Albertina Hyun, PA-C    sertraline 50 mg Oral Daily Albertina Hyun, PA-C    simvastatin 40 mg Oral HS Albertinamonalisa Purvis PA-C      Continuous Infusions:    multi-electrolyte 50 mL/hr Last Rate: 50 mL/hr (10/09/18 1638)   niCARdipine 2 5-15 mg/hr Last Rate: 10 mg/hr (10/09/18 1629)     PRN Meds:    acetaminophen 650 mg Q4H PRN   acetaminophen 650 mg Q4H PRN   benzonatate 200 mg TID PRN   bisacodyl 10 mg Daily PRN   fentanyl citrate (PF) 50 mcg Q1H PRN   ondansetron 4 mg Q6H PRN   potassium chloride 20 mEq Once PRN   potassium chloride 20 mEq Q1H PRN   potassium chloride 20 mEq Q30 Min PRN       VTE Pharmacologic Prophylaxis: Fondaparinux (Arixtra)  VTE Mechanical Prophylaxis: sequential compression device    Invasive lines and devices: Invasive Devices     Central Venous Catheter Line            CVC Central Lines 10/09/18 Triple less than 1 day          Peripheral Intravenous Line            Peripheral IV 10/09/18 Left;Dorsal (posterior) Hand less than 1 day    Peripheral IV 10/09/18 Left;Ventral (anterior) Antecubital less than 1 day          Arterial Line            Arterial Line 10/09/18 Right Radial less than 1 day          Drain            Urethral Catheter Non-latex; Temperature probe 16 Fr  less than 1 day          Airway            ETT Cuffed;Oral;Hi-Lo 7 mm less than 1 day                Vitals:   Vitals:    10/09/18 1019 10/09/18 1048 10/09/18 1605   BP: 158/75     Pulse: 72  (!) 53   Resp: 16  14   Temp: 98 7 °F (37 1 °C)     TempSrc: Tympanic Core     SpO2: 98%  100%   Weight:  98 kg (216 lb)    Height:  4' 7" (1 397 m)              Temperature: Temp (24hrs), Av 7 °F (37 1 °C), Min:98 7 °F (37 1 °C), Max:98 7 °F (37 1 °C)  Current: Temperature: 98 7 °F (37 1 °C)    Weights: IBW: 34 kg  Body mass index is 50 2 kg/m²  Respiratory/Ventilator Settings:   Vent Mode: AC/PC  Resp Rate (BPM): 14 BPM           SpO2: 100 %    Physical Exam:    Constitutional: Obese elderly female intubated in bed  HENT: Atraumatic  Neck: Supple  +CVC  Cardiovascular: Slowed rate and regular rhythm  Pulmonary/Chest: Breath sounds clear bilaterally  Abdominal: Soft  No distension  Obese  B/L groins without significant hematoma  Musculoskeletal: Trace edema  Neurological: Unable to assess due to sedation  Skin: Skin is warm and dry  Psychiatric: Unable to assess due to sedation  PV: +1 DP pulse on LEFT and +1 DP pulse on RIGHT  Labs:     Results from last 7 days  Lab Units 10/09/18  1624 10/09/18  1558 10/09/18  1531   HEMOGLOBIN g/dL  --  8 5*  --    I STAT HEMOGLOBIN g/dl 8 5*  --  8 2*   HEMATOCRIT % 25* 29 3* 24*   PLATELETS Thousands/uL  --  331  --        Results from last 7 days  Lab Units 10/09/18  1624 10/09/18  1558 10/09/18  1531 10/09/18  1513   SODIUM mmol/L  --  140  --   --    POTASSIUM mmol/L  --  4 1  --   --    CHLORIDE mmol/L  --  108  --   --    CO2 mmol/L  --  29  --   --    BUN mg/dL  --  21  --   --    CREATININE mg/dL  --  0 68  --   --    CALCIUM mg/dL  --  8 1*  --   --    GLUCOSE, ISTAT mg/dl 101  --  97 95       Post-op AB 36/52/229/30/4/100%  Post-op CXR: Lines and tubes in good position  No pTX  Lungs clear  I have personally reviewed pertinent films in PACS  Post-op EKG: NSR  1 degree AV block    This was personally reviewed by myself  Impression:  Principal Problem:    Severe aortic stenosis  Active Problems:    S/P TAVR (transcatheter aortic valve replacement)    Chronic diastolic congestive heart failure (HCC)    LVH (left ventricular hypertrophy)    JANICE on CPAP    COPD, mild (HCC)    Coronary artery disease    AVB (atrioventricular block)    Hypertension    Hyperlipidemia    Hypothyroid    Obesity, morbid (HCC)    Gastroesophageal reflux disease without esophagitis    Arthritis      Plan:    Neuro: D/C sedation  PRN tylenol for pain  Regulate sleep/wake cycle  Delirium precautions  CV: MAP goal >65  SBP goal <130  Post-op medications: None  Isolyte 50 mL/hour  Additional volume resuscitation as needed  Monitor rhythm on telemetry  Intra-op GARRISON EF 60%  Lung: Check STAT post-op ABG and CXR  Spontaneous breathing trial with goal to wean vent to extubation  GI: GI prophylaxis  Bowel regimen  Zofran PRN for nausea  FEN: NPO  Replete K >4 0 and Mag >2 0  : Tate  Monitor UOP with goal >40/hour  Check BMP  Lasix if needed for low UOP  ID: Prophylactic post-op abx  Maintain normothermia  Trend WBC and temps  Heme: Check STAT post-op H/H and platelets  Monitor incision site and  invasive lines for bleeding  Endo: SQ insulin coverage  Peripheral Vascular: Monitor distal pulses Q 1 hour  Disposition: ICU Care    Counseling / Coordination of Care  Total Critical Care time spent 0 minutes excluding procedures, teaching and family updates        SIGNATURE: Lazara Aguayo PA-C  DATE: October 9, 2018  TIME: 4:44 PM

## 2018-10-09 NOTE — OP NOTE
OPERATIVE REPORT  PATIENT NAME: Author Taylor    :  1940  MRN: [id number]  Pt Location: [facility] OR ROOM 02    SURGERY DATE: 10/9/2018    SURGEON: Tracey Cantu MD     ASSISTANT: Odell Clayton DO     CO-SURGEON: Dr Uriel Jiménez DIAGNOSIS Symptomatic severe aortic stenosis  POSTOPERATIVE DIAGNOSIS Symptomatic severe aortic stenosis  NYHA CLASS: 4    CCS CLASS: 4    PROCEDURE Transcatheter aortic valve replacement with a 23 mm Cornelius NOE 3 bioprosthetic valve via a percutaneous left transfemoral approach  ANESTHESIA Dr Phuong Ellison, general endotracheal anesthesia with transesophageal echocardiogram guidance  CARDIOPULMONARY BYPASS TIME 0  PACKS/TUBES/DRAINS None  ESTIMATED BLOOD LOSS: 200 mL    OPERATIVE TECHNIQUE The patient was taken to the operating room and placed supine on the operating table  Following the satisfactory induction of general anesthesia and placement of monitoring lines, the patient was prepped and draped in the usual sterile fashion  A time-out procedure was performed  The right common femoral artery was accessed percutaneously using Seldinger technique and fluoroscopy  The right common femoral vein was accessed in a similar fashion and was cannulated with a 6 [place] sheath  The femoral artery was cannulated with a 7 English sheath  A pigtail catheter was inserted and advanced through the right common femoral artery sheath into the right coronary cusp  Using a series of injections, the angle of deployment was determined  Through the right common femoral vein sheath, a balloon tip temporary pacing catheter was inserted into the right ventricle and its capture was confirmed  The left common femoral artery was accessed percutaneously using Seldinger technique and fluoroscopy  Two (2) Perclose sutures were deployed in the standard fashion The patient was systemically heparinized   The left common femoral artery was then accessed with an extra-stiff wire and the sheath was inserted through the left common femoral artery and advanced into the aorta  The aortic valve was crossed with a wire  The balloon was inserted through the sheath and positioned in the aortic annulus  During an episode of rapid pacing, balloon valvuloplasty was performed  The balloon was subsequently removed  A 23 mm NOE 3 valve was then advanced through the sheath into the aorta and positioned onto the deployment balloon in the aorta and then advanced around the aortic arch and through the annulus of the aortic valve to the appropriate level  At this point, the catheter was desheathed in the standard fashion  The valve was positioned appropriately using a combination of fluoroscopy and transesophageal echocardiogram guidance  During an episode of rapid pacing, balloon deployment of the 23 mm NOE 3 valve was performed  Following deployment, the position of the valve was confirmed by fluoroscopy and echocardiography and its position appeared appropriate with no perivalvular leak  The valve delivery system was subsequently removed and the sheath was removed from the left common femoral artery while the Perclose sutures were secured and direct pressure was held  Protamine was administered with normalization of the ACT  Pressure was released from the left groin and there was no active bleeding and no evidence of hematoma development  The common femoral artery sheath was removed from the right following deployment of a closure device  The common femoral vein sheath was removed from the right and pressure was held  Sponge, needle, and instrument counts were reported as correct by the nursing staff  There was no qualified surgical resident available to assist  As the attending surgeon, I was present and scrubbed for all critical portions of this procedure   Final transesophageal echocardiogram demonstrated a well-positioned bioprosthetic transcatheter aortic valve with normal function and no perivalvular leak         Karsten Parra MD  DATE: October 9, 2018  TIME: 4:28 PM

## 2018-10-09 NOTE — ANESTHESIA PROCEDURE NOTES
Procedure Performed: GARRISON Anesthesia  Start Time:  10/9/2018 2:33 PM  End Time:   10/9/2018 3:24 PM      Preanesthesia Checklist    Patient identified, IV assessed, risks and benefits discussed, monitors and equipment assessed, procedure being performed at surgeon's request and anesthesia consent obtained  Procedure    Diagnostic Indications for GARRISON:  assessment of surgical repair, defect repair evaluation and hemodynamic monitoring  Type of GARRISON: interventional GARRISON with real time guidance of intracardiac procedure  Images Saved: ultrasound permanent image saved  Physician Requesting Echo: Heather Alexandre  Location performed: OR  Intubated  Bite block not placed  Heart visualized  Insertion of GARRISON Probe:  Atraumatic  Probe Type:  Multiplane  Modalities:  3D, pulse wave Doppler, color flow mapping and continuous wave Doppler  Echocardiographic and Doppler Measurements    PREPROCEDURE    LEFT VENTRICLE:  Systolic Function: normal  Ejection Fraction: 60%  RIGHT VENTRICLE:  Systolic Function: normal               AORTIC VALVE:  Leaflets: normal  Leaflet motions restricted  Stenosis: severe and severely restricted, severely calcified  Mean Gradient: 32 mmHg  Peak Gradient: 56 mmHg  Maximum velocity (cm/s): 3 63 m/sec  Regurgitation: mild  Pressure Half-time: 700 ms  MITRAL VALVE:  Leaflets: calcified and posterior annulus calcified  Leaflet Motions: restricted  Stenosis: mild  Mean Gradient: 4 mmHg  TRICUSPID VALVE:  Leaflets: normal  Regurgitation: trace  ASCENDING AORTA:  Dissection not present  AORTIC ARCH:  dissection not present  Grade 3: atheroma protruding < 0 5 cm into lumen  DESCENDING AORTA:  Grade 3: atheroma protruding < 0 5 cm into lumen  LEFT ATRIAL APPENDAGE:  Emptying Velocity: 61 cm/sec  OTHER ATRIAL FINDINGS:  No NIKO clot  ATRIAL SEPTUM:  Intra-atrial septal morphology: no PFO by CFD                       POSTPROCEDURE    LEFT VENTRICLE: Unchanged   RIGHT VENTRICLE: Unchanged   AORTIC VALVE:   Leaflets: NOE valve present in aortic position, no PVL  Mean Gradient: 9 (AV VTI 49 6, LVOT 17 8, VASU 1 12 cm^2) mmHg  MITRAL VALVE: Unchanged   TRICUSPID VALVE: Unchanged   AORTA: Unchanged   Dissection: Dissection not present  REMOVAL:  Probe Removal: atraumatic

## 2018-10-10 ENCOUNTER — APPOINTMENT (INPATIENT)
Dept: RADIOLOGY | Facility: HOSPITAL | Age: 78
DRG: 266 | End: 2018-10-10
Payer: MEDICARE

## 2018-10-10 ENCOUNTER — APPOINTMENT (INPATIENT)
Dept: NON INVASIVE DIAGNOSTICS | Facility: HOSPITAL | Age: 78
DRG: 266 | End: 2018-10-10
Payer: MEDICARE

## 2018-10-10 DIAGNOSIS — I35.0 SEVERE AORTIC STENOSIS: Primary | ICD-10-CM

## 2018-10-10 PROBLEM — J98.4 ACUTE PULMONARY INSUFFICIENCY: Status: ACTIVE | Noted: 2018-10-10

## 2018-10-10 LAB
ABO GROUP BLD BPU: NORMAL
ANION GAP SERPL CALCULATED.3IONS-SCNC: 8 MMOL/L (ref 4–13)
ATRIAL RATE: 66 BPM
BASE EXCESS BLDA CALC-SCNC: 0 MMOL/L
BASE EXCESS BLDA CALC-SCNC: 0.6 MMOL/L
BODY TEMPERATURE: 100.2 DEGREES FEHRENHEIT
BODY TEMPERATURE: 99.5 DEGREES FEHRENHEIT
BPU ID: NORMAL
BUN SERPL-MCNC: 15 MG/DL (ref 5–25)
CALCIUM SERPL-MCNC: 8.3 MG/DL (ref 8.3–10.1)
CHLORIDE SERPL-SCNC: 107 MMOL/L (ref 100–108)
CO2 SERPL-SCNC: 25 MMOL/L (ref 21–32)
CREAT SERPL-MCNC: 0.67 MG/DL (ref 0.6–1.3)
CROSSMATCH: NORMAL
ERYTHROCYTE [DISTWIDTH] IN BLOOD BY AUTOMATED COUNT: 18.8 % (ref 11.6–15.1)
GFR SERPL CREATININE-BSD FRML MDRD: 84 ML/MIN/1.73SQ M
GLUCOSE SERPL-MCNC: 107 MG/DL (ref 65–140)
GLUCOSE SERPL-MCNC: 110 MG/DL (ref 65–140)
GLUCOSE SERPL-MCNC: 116 MG/DL (ref 65–140)
GLUCOSE SERPL-MCNC: 127 MG/DL (ref 65–140)
GLUCOSE SERPL-MCNC: 134 MG/DL (ref 65–140)
HCO3 BLDA-SCNC: 24 MMOL/L (ref 22–28)
HCO3 BLDA-SCNC: 25.9 MMOL/L (ref 22–28)
HCT VFR BLD AUTO: 30.6 % (ref 34.8–46.1)
HCT VFR BLD AUTO: 30.8 % (ref 34.8–46.1)
HGB BLD-MCNC: 9 G/DL (ref 11.5–15.4)
HGB BLD-MCNC: 9.2 G/DL (ref 11.5–15.4)
MCH RBC QN AUTO: 24 PG (ref 26.8–34.3)
MCHC RBC AUTO-ENTMCNC: 29.9 G/DL (ref 31.4–37.4)
MCV RBC AUTO: 80 FL (ref 82–98)
O2 CT BLDA-SCNC: 13.7 ML/DL (ref 16–23)
O2 CT BLDA-SCNC: 14.5 ML/DL (ref 16–23)
OXYHGB MFR BLDA: 96 % (ref 94–97)
OXYHGB MFR BLDA: 96.5 % (ref 94–97)
P AXIS: 48 DEGREES
PCO2 BLDA: 36.3 MM HG (ref 36–44)
PCO2 BLDA: 44.7 MM HG (ref 36–44)
PH BLDA: 7.38 [PH] (ref 7.35–7.45)
PH BLDA: 7.44 [PH] (ref 7.35–7.45)
PLATELET # BLD AUTO: 352 THOUSANDS/UL (ref 149–390)
PMV BLD AUTO: 9.6 FL (ref 8.9–12.7)
PO2 BLDA: 80 MM HG (ref 75–129)
PO2 BLDA: 95.1 MM HG (ref 75–129)
POTASSIUM SERPL-SCNC: 3.6 MMOL/L (ref 3.5–5.3)
POTASSIUM SERPL-SCNC: 3.9 MMOL/L (ref 3.5–5.3)
PR INTERVAL: 200 MS
PS CM H2O: 10
PS CM H2O: 5
PS VENT FIO2: 40
PS VENT FIO2: 40
PS VENT PEEP: 5
PS VENT PEEP: 5
QRS AXIS: 3 DEGREES
QRSD INTERVAL: 75 MS
QT INTERVAL: 425 MS
QTC INTERVAL: 446 MS
RBC # BLD AUTO: 3.83 MILLION/UL (ref 3.81–5.12)
SODIUM SERPL-SCNC: 140 MMOL/L (ref 136–145)
SPECIMEN SOURCE: ABNORMAL
SPECIMEN SOURCE: ABNORMAL
T WAVE AXIS: 42 DEGREES
UNIT DISPENSE STATUS: NORMAL
UNIT PRODUCT CODE: NORMAL
UNIT RH: NORMAL
VENT - PS: ABNORMAL
VENT - PS: ABNORMAL
VENTRICULAR RATE: 66 BPM
WBC # BLD AUTO: 11.78 THOUSAND/UL (ref 4.31–10.16)

## 2018-10-10 PROCEDURE — 97110 THERAPEUTIC EXERCISES: CPT

## 2018-10-10 PROCEDURE — 93010 ELECTROCARDIOGRAM REPORT: CPT | Performed by: INTERNAL MEDICINE

## 2018-10-10 PROCEDURE — G8988 SELF CARE GOAL STATUS: HCPCS

## 2018-10-10 PROCEDURE — 99233 SBSQ HOSP IP/OBS HIGH 50: CPT | Performed by: PHYSICIAN ASSISTANT

## 2018-10-10 PROCEDURE — G8987 SELF CARE CURRENT STATUS: HCPCS

## 2018-10-10 PROCEDURE — 84132 ASSAY OF SERUM POTASSIUM: CPT | Performed by: PHYSICIAN ASSISTANT

## 2018-10-10 PROCEDURE — 93005 ELECTROCARDIOGRAM TRACING: CPT

## 2018-10-10 PROCEDURE — 82805 BLOOD GASES W/O2 SATURATION: CPT | Performed by: PHYSICIAN ASSISTANT

## 2018-10-10 PROCEDURE — 85027 COMPLETE CBC AUTOMATED: CPT | Performed by: PHYSICIAN ASSISTANT

## 2018-10-10 PROCEDURE — 82948 REAGENT STRIP/BLOOD GLUCOSE: CPT

## 2018-10-10 PROCEDURE — 97535 SELF CARE MNGMENT TRAINING: CPT

## 2018-10-10 PROCEDURE — 71045 X-RAY EXAM CHEST 1 VIEW: CPT

## 2018-10-10 PROCEDURE — 93306 TTE W/DOPPLER COMPLETE: CPT

## 2018-10-10 PROCEDURE — G8979 MOBILITY GOAL STATUS: HCPCS

## 2018-10-10 PROCEDURE — 99231 SBSQ HOSP IP/OBS SF/LOW 25: CPT | Performed by: THORACIC SURGERY (CARDIOTHORACIC VASCULAR SURGERY)

## 2018-10-10 PROCEDURE — 97163 PT EVAL HIGH COMPLEX 45 MIN: CPT

## 2018-10-10 PROCEDURE — G8978 MOBILITY CURRENT STATUS: HCPCS

## 2018-10-10 PROCEDURE — 85018 HEMOGLOBIN: CPT | Performed by: THORACIC SURGERY (CARDIOTHORACIC VASCULAR SURGERY)

## 2018-10-10 PROCEDURE — 99222 1ST HOSP IP/OBS MODERATE 55: CPT | Performed by: INTERNAL MEDICINE

## 2018-10-10 PROCEDURE — 94760 N-INVAS EAR/PLS OXIMETRY 1: CPT

## 2018-10-10 PROCEDURE — 97166 OT EVAL MOD COMPLEX 45 MIN: CPT

## 2018-10-10 PROCEDURE — 94660 CPAP INITIATION&MGMT: CPT

## 2018-10-10 PROCEDURE — 85014 HEMATOCRIT: CPT | Performed by: THORACIC SURGERY (CARDIOTHORACIC VASCULAR SURGERY)

## 2018-10-10 PROCEDURE — 80048 BASIC METABOLIC PNL TOTAL CA: CPT | Performed by: PHYSICIAN ASSISTANT

## 2018-10-10 RX ORDER — FUROSEMIDE 10 MG/ML
40 INJECTION INTRAMUSCULAR; INTRAVENOUS DAILY
Status: DISCONTINUED | OUTPATIENT
Start: 2018-10-10 | End: 2018-10-11 | Stop reason: HOSPADM

## 2018-10-10 RX ORDER — DOCUSATE SODIUM 100 MG/1
100 CAPSULE, LIQUID FILLED ORAL 2 TIMES DAILY
Status: DISCONTINUED | OUTPATIENT
Start: 2018-10-10 | End: 2018-10-10 | Stop reason: SDUPTHER

## 2018-10-10 RX ORDER — FUROSEMIDE 10 MG/ML
20 INJECTION INTRAMUSCULAR; INTRAVENOUS ONCE
Status: COMPLETED | OUTPATIENT
Start: 2018-10-10 | End: 2018-10-10

## 2018-10-10 RX ORDER — SODIUM CHLORIDE 9 MG/ML
75 INJECTION, SOLUTION INTRAVENOUS CONTINUOUS
Status: DISCONTINUED | OUTPATIENT
Start: 2018-10-10 | End: 2018-10-11 | Stop reason: HOSPADM

## 2018-10-10 RX ORDER — POTASSIUM CHLORIDE 20 MEQ/1
20 TABLET, EXTENDED RELEASE ORAL DAILY
Status: DISCONTINUED | OUTPATIENT
Start: 2018-10-10 | End: 2018-10-11 | Stop reason: HOSPADM

## 2018-10-10 RX ADMIN — MUPIROCIN 1 APPLICATION: 20 OINTMENT TOPICAL at 09:03

## 2018-10-10 RX ADMIN — POTASSIUM CHLORIDE 20 MEQ: 1500 TABLET, EXTENDED RELEASE ORAL at 09:03

## 2018-10-10 RX ADMIN — METOPROLOL SUCCINATE 25 MG: 25 TABLET, EXTENDED RELEASE ORAL at 09:03

## 2018-10-10 RX ADMIN — POTASSIUM CHLORIDE 20 MEQ: 200 INJECTION, SOLUTION INTRAVENOUS at 01:11

## 2018-10-10 RX ADMIN — CEFAZOLIN SODIUM 2000 MG: 2 SOLUTION INTRAVENOUS at 07:30

## 2018-10-10 RX ADMIN — PRAVASTATIN SODIUM 80 MG: 80 TABLET ORAL at 21:00

## 2018-10-10 RX ADMIN — ACETAMINOPHEN 650 MG: 325 TABLET, FILM COATED ORAL at 09:02

## 2018-10-10 RX ADMIN — CLOPIDOGREL BISULFATE 75 MG: 75 TABLET ORAL at 09:02

## 2018-10-10 RX ADMIN — FUROSEMIDE 20 MG: 10 INJECTION, SOLUTION INTRAMUSCULAR; INTRAVENOUS at 00:33

## 2018-10-10 RX ADMIN — ASPIRIN 81 MG: 81 TABLET, COATED ORAL at 09:02

## 2018-10-10 RX ADMIN — MUPIROCIN 1 APPLICATION: 20 OINTMENT TOPICAL at 20:53

## 2018-10-10 RX ADMIN — FUROSEMIDE 40 MG: 10 INJECTION, SOLUTION INTRAMUSCULAR; INTRAVENOUS at 09:03

## 2018-10-10 RX ADMIN — SODIUM CHLORIDE 4 MG/HR: 0.9 INJECTION, SOLUTION INTRAVENOUS at 05:07

## 2018-10-10 RX ADMIN — SODIUM CHLORIDE 75 ML/HR: 0.9 INJECTION, SOLUTION INTRAVENOUS at 15:58

## 2018-10-10 RX ADMIN — SERTRALINE HYDROCHLORIDE 50 MG: 50 TABLET ORAL at 09:03

## 2018-10-10 RX ADMIN — FONDAPARINUX SODIUM 2.5 MG: 2.5 INJECTION, SOLUTION SUBCUTANEOUS at 09:03

## 2018-10-10 RX ADMIN — DOCUSATE SODIUM 100 MG: 100 CAPSULE, LIQUID FILLED ORAL at 09:03

## 2018-10-10 RX ADMIN — CEFAZOLIN SODIUM 2000 MG: 2 SOLUTION INTRAVENOUS at 00:20

## 2018-10-10 RX ADMIN — ACETAMINOPHEN 650 MG: 325 TABLET, FILM COATED ORAL at 19:21

## 2018-10-10 RX ADMIN — LORATADINE 10 MG: 10 TABLET ORAL at 09:03

## 2018-10-10 RX ADMIN — DOCUSATE SODIUM 100 MG: 100 CAPSULE, LIQUID FILLED ORAL at 17:09

## 2018-10-10 RX ADMIN — POLYETHYLENE GLYCOL 3350 17 G: 17 POWDER, FOR SOLUTION ORAL at 09:03

## 2018-10-10 RX ADMIN — POTASSIUM CHLORIDE 20 MEQ: 200 INJECTION, SOLUTION INTRAVENOUS at 05:18

## 2018-10-10 RX ADMIN — CEFAZOLIN SODIUM 2000 MG: 2 SOLUTION INTRAVENOUS at 16:00

## 2018-10-10 NOTE — RESPIRATORY THERAPY NOTE
RT Ventilator Management Note  Sarwat File 65 y o  female MRN: [id number]  Unit/Bed#: [facility] 418-01 Encounter: [id number]      Daily Screen       10/9/2018 1752 10/9/2018 1858          Patient safety screen outcome[de-identified] - Passed      Spont breathing trial % for 30 min: - -      Spont breathing trial outcome[de-identified] Failed -      Previous settings resumed: Yes -      RSBI: 156 -              Physical Exam:   Assessment Type: Assess only  General Appearance: Sedated  Chest Assessment: Chest expansion symmetrical      Resp Comments: (P) Pt  placed back on A/C secondary to increased WOB  Resp rate in mid 40s with Vt of 100-150

## 2018-10-10 NOTE — PLAN OF CARE
Problem: PHYSICAL THERAPY ADULT  Goal: Performs mobility at highest level of function for planned discharge setting  See evaluation for individualized goals  Treatment/Interventions: Functional transfer training, LE strengthening/ROM, Therapeutic exercise, Endurance training, Bed mobility, Gait training, Spoke to nursing, Spoke to case management  Equipment Recommended: Jayro Dukes (already has one)       See flowsheet documentation for full assessment, interventions and recommendations  Outcome: Progressing  Prognosis: Good  Problem List: Decreased strength, Decreased endurance, Impaired balance, Decreased mobility, Obesity    Assessment: Additional follow up consecutive session performed to initiate LE therex in order to max strength and to facilitate progression w/ functional mobility skills and overall level of (I)  Pt was able to complete the program w/ no excessive fatigue or increased discomfort expressed; mild GALINDO noted w/ O2 sat in the 90s %; pt remained in NAD and appeared to be comfortable at the end of session; currently, cont to recommend home PT follow up upon returning home when medically cleared; will follow  Barriers to Discharge: Decreased caregiver support     Recommendation: Home PT          See flowsheet documentation for full assessment

## 2018-10-10 NOTE — PHYSICAL THERAPY NOTE
Physical Therapy Evaluation     Patient's Name: Nieves Cooper    Admitting Diagnosis  Severe aortic stenosis [I35 0]    Problem List  Patient Active Problem List   Diagnosis    Hypothyroid    Hypertension    Hyperlipidemia    Reactive airway disease without complication    JANICE (obstructive sleep apnea)    LVH (left ventricular hypertrophy)    Mitral annular calcification    Mitral valve stenosis    Pulmonary hypertension (HCC)    Chronic diastolic (congestive) heart failure (HCC)    Anemia    Obesity, morbid ([facility] [address] )    Gastroesophageal reflux disease without esophagitis    Arthritis    AVB (atrioventricular block)    Chronic diastolic congestive heart failure (HCC)    COPD, mild (HCC)    Coronary artery disease    JANICE on CPAP    S/P TAVR (transcatheter aortic valve replacement)    Acute pulmonary insufficiency       Past Medical History  Past Medical History:   Diagnosis Date    Anemia 08/22/2018    Arthritis     AVB (atrioventricular block)     first degree    CHF (congestive heart failure) (HCC)     COPD, mild (HCC)     Coronary artery disease     Dislocation of right shoulder joint     Frequent UTI     GERD (gastroesophageal reflux disease)     H/O: pneumonia     Heme positive stool     History of uterine cancer     s/p CALLIE    Hyperlipidemia     Hypertension     Hypothyroidism     Obesity, morbid ([facility] [facility] ) 08/22/2018    JANICE on CPAP     Pulmonary hypertension ([facility] [facility] ) 08/22/2018    Severe aortic stenosis     Simple goiter     Skin cyst     within the armpits, right       Past Surgical History  Past Surgical History:   Procedure Laterality Date    BREAST BIOPSY      CARDIAC CATHETERIZATION      CARPAL TUNNEL RELEASE Bilateral     CHOLECYSTECTOMY      DILATION AND CURETTAGE OF UTERUS      HYSTEROSCOPY      SC COLONOSCOPY FLX DX W/COLLJ SPEC WHEN PFRMD N/A 9/6/2018    Procedure: COLONOSCOPY;  Surgeon: Kamini Kellogg MD;  [facility];   Service: Gastroenterology  PA ECHO TRANSESOPHAG R-T 2D W/PRB IMG ACQUISJ I&R N/A 10/9/2018    Procedure: INTRA-OP TRANSESOPHAGEAL ECHOCARDIOGRAM (GARRISON); Surgeon: Issa Flynn DO;  [facility];  Service: Cardiac Surgery    PA ESOPHAGOGASTRODUODENOSCOPY TRANSORAL DIAGNOSTIC N/A 8/31/2018    Procedure: ESOPHAGOGASTRODUODENOSCOPY (EGD); Surgeon: Patito Estevez MD;  [facility]; Service: Gastroenterology    PA REPLACE AORTIC VALVE OPENFEMORAL ARTERY APPROACH N/A 10/9/2018    Procedure: REPLACEMENT AORTIC VALVE TRANSCATHETER (TAVR) TRANSFEMORAL W/ 23 MM MENDOZA NOE S3 VALVE (ACCESS OF LEFT);   Surgeon: Issa Flynn DO;  [facility];  Service: Cardiac Surgery    TOTAL ABDOMINAL HYSTERECTOMY      TOTAL HIP ARTHROPLASTY Left 2007    TOTAL KNEE ARTHROPLASTY Bilateral             10/10/18 1132   Note Type   Note type Eval/Treat   Pain Assessment   Pain Assessment No/denies pain   Home Living   Type of [address] One level;Ramped entrance   [address] [facility]  (suppl O2)   Prior Function   Level of [place] Independent with ADLs and functional mobility  (amb w/ rw)   Lives With Family  (sister)   Receives Help From (provides (A) to her sister)   Falls in the last 6 months 0   Restrictions/Precautions   Braces or Orthoses (denies)   Other Precautions Cardiac/sternal;Telemetry;Multiple lines;O2;Fall Risk   General   Additional Pertinent History cleared for assessment (spoke to nsg)   Cognition   Overall Cognitive Status WFL   Arousal/Participation Cooperative   Orientation Level Oriented to person;Oriented to place;Oriented to situation   Memory Within functional limits   Following Commands Follows one step commands without difficulty   Comments Pt is observed reclined in the chair; agreeable to mobilize   RUE Assessment   RUE Assessment WFL  (AROM)   LUE Assessment   LUE Assessment WFL  (AROM)   RLE Assessment   RLE Assessment WFL  (AROM)   Strength RLE   RLE Overall Strength (fair/ fair + (grossly))   LLE Assessment   LLE Assessment WFL  (AROM)   Strength LLE   LLE Overall Strength (fair/ fair + (grossly))   Transfers   Sit to Stand 4  Minimal assistance   Additional items Assist x 1;Verbal cues   Stand to Sit 4  Minimal assistance   Additional items Assist x 1;Verbal cues   Stand pivot 4  Minimal assistance  (BSC to chair to the (L) side)   Additional items Assist x 1;Verbal cues   Toilet transfer 4  Minimal assistance   Additional items Assist x 1;Commode   Ambulation/Elevation   Gait pattern Excessively slow; Short stride; Inconsistent yemi  (No overt uncorrected LOB, gross knee buckling, or swaying )   Gait Assistance 4  Minimal assist   Additional items Assist x 1;Assist x 3; Tactile cues  (stand by (A) of 2 more staff for lines and chair follow)   Assistive Device Rolling walker   Distance 180 ft   Balance   Static Sitting Fair   Static Standing Fair -   Ambulatory Poor +   Activity Tolerance   Activity Tolerance Patient tolerated treatment well   Nurse Made Aware spoke to Hernan Duff, [address] [place] [address]   Assessment   Prognosis Good   Problem List Decreased strength;Decreased endurance; Impaired balance;Decreased mobility;Obesity   Assessment Pt is 65 y o  female admitted with Dx of Severe aortic stenosis and underwent transcatheter aortic valve replacement via a percutaneous left transfemoral approach on 10/9/2018  Pt 's comorbidities affecting POC include: CHF, CAD, sleep apnea, HTN, obesity, and use of suppl O2 at home and personal factors of: 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  Pt's clinical presentation is currently unstable/unpredictable which is evident in abn lab values, ongoing telem monitoring while in a critical care unit, and need for min assist w/ all phases of mobility when usually mobilizing independently   Pt presents w/ generalized weakness, incl decreased LE strength, decreased functional endurance, and min impaired balance w/ associated gait dysfunction (likely near to premorbid level --> already uses rw at home)  Will cont to follow pt in PT for progressive mobilization to address above functional deficits and to max level of (I), endurance, and safety  Otherwise, anticipate pt will return home upon D/C provided she cont improving w/ mobility skills, safety, and endurance and when medically cleared; home PT follow up is recommended;   Barriers to Discharge Decreased caregiver support   Goals   Patient Goals to go home   STG Expiration Date 10/17/18   Short Term Goal #1 5-7 days  Pt will amb 300 ft w/ rw, mod (I) in order to facilitate safe return to premorbid environment and community amb status  Pt will achieve (I) level w/ bed mob in order to facilitate safety with OOB and back to bed transitions in own living environment  Pt will perform transfers w/ mod (I) to assure (I) and safety w/ functional mobility/transitions w/ all aspects of mobility/locomotion  Pt will participate in LE therex and balance activities to max progression w/ mobility skills  Treatment Day 0   Plan   Treatment/Interventions Functional transfer training;LE strengthening/ROM; Therapeutic exercise; Endurance training;Bed mobility;Gait training;Spoke to nursing;Spoke to case management   PT Frequency Other (Comment)  (3-5x/wk)   Recommendation   Recommendation Home PT   Equipment Recommended Walker  (already has one)   Modified Yancy Scale   Modified [place] Scale 4   Barthel Index   Feeding 10   Bathing 0   Grooming Score 5   Dressing Score 5   Bladder Score 10   Bowels Score 10   Toilet Use Score 5   Transfers (Bed/Chair) Score 10   Mobility (Level Surface) Score 10   Stairs Score 0   Barthel Index Score 65     Khari Shipman, PT

## 2018-10-10 NOTE — OCCUPATIONAL THERAPY NOTE
OccupationalTherapy Evaluation & Treatment Note     Patient Name: Ashlee Mills  BAZMX'W Date: 10/10/2018  Problem List  Patient Active Problem List   Diagnosis    Hypothyroid    Hypertension    Hyperlipidemia    Reactive airway disease without complication    JANICE (obstructive sleep apnea)    LVH (left ventricular hypertrophy)    Mitral annular calcification    Mitral valve stenosis    Pulmonary hypertension (HCC)    Chronic diastolic (congestive) heart failure (HCC)    Anemia    Obesity, morbid (HCC)    Gastroesophageal reflux disease without esophagitis    Arthritis    AVB (atrioventricular block)    Chronic diastolic congestive heart failure (HCC)    COPD, mild (HCC)    Coronary artery disease    JANICE on CPAP    S/P TAVR (transcatheter aortic valve replacement)    Acute pulmonary insufficiency     Past Medical History  Past Medical History:   Diagnosis Date    Anemia 08/22/2018    Arthritis     AVB (atrioventricular block)     first degree    CHF (congestive heart failure) (HCC)     COPD, mild (HCC)     Coronary artery disease     Dislocation of right shoulder joint     Frequent UTI     GERD (gastroesophageal reflux disease)     H/O: pneumonia     Heme positive stool     History of uterine cancer     s/p CALLIE    Hyperlipidemia     Hypertension     Hypothyroidism     Obesity, morbid ([facility] [address] ) 08/22/2018    JANICE on CPAP     Pulmonary hypertension ([facility] [address] ) 08/22/2018    Severe aortic stenosis     Simple goiter     Skin cyst     within the armpits, right     Past Surgical History  Past Surgical History:   Procedure Laterality Date    BREAST BIOPSY      CARDIAC CATHETERIZATION      CARPAL TUNNEL RELEASE Bilateral     CHOLECYSTECTOMY      DILATION AND CURETTAGE OF UTERUS      HYSTEROSCOPY      MS COLONOSCOPY FLX DX W/COLLJ SPEC WHEN PFRMD N/A 9/6/2018    Procedure: COLONOSCOPY;  Surgeon: Manley Babinski, MD;  [facility];   Service: Gastroenterology    MS ECHO TRANSESOPHAG R-T 2D W/PRB IMG ACQUISJ I&R N/A 10/9/2018    Procedure: INTRA-OP TRANSESOPHAGEAL ECHOCARDIOGRAM (GARRISON); Surgeon: Kash Santillan DO;  [facility];  Service: Cardiac Surgery    NV ESOPHAGOGASTRODUODENOSCOPY TRANSORAL DIAGNOSTIC N/A 8/31/2018    Procedure: ESOPHAGOGASTRODUODENOSCOPY (EGD); Surgeon: Alex Cox MD;  [facility]; Service: Gastroenterology    NV REPLACE AORTIC VALVE OPENFEMORAL ARTERY APPROACH N/A 10/9/2018    Procedure: REPLACEMENT AORTIC VALVE TRANSCATHETER (TAVR) TRANSFEMORAL W/ 23 MM MENDOZA NOE S3 VALVE (ACCESS OF LEFT);   Surgeon: Kash Santillan DO;  [facility];  Service: Cardiac Surgery    TOTAL ABDOMINAL HYSTERECTOMY      TOTAL HIP ARTHROPLASTY Left 2007    TOTAL KNEE ARTHROPLASTY Bilateral          10/10/18 1620   Note Type   Note type Eval/Treat   Restrictions/Precautions   Weight Bearing Precautions Per Order No   Other Precautions Cardiac/sternal;O2   Pain Assessment   Pain Assessment No/denies pain   Pain Score No Pain   Home Living   Type of [address]   Bathroom Shower/Tub Tub/shower unit   Bathroom Toilet Raised   Bathroom Equipment Shower chair   Bathroom Accessibility Accessible   Home Equipment Walker;Reacher;Sock aid;Long-handled shoehorn   Prior Function   Level of [place] Independent with ADLs and functional mobility  ( takes care of sister-medication management, meals)   Lives With Family  (sister)   Receives Help From Family   ADL Assistance Independent   IADLs Independent   Falls in the last 6 months 0   Vocational Retired   Comments sister can assist with laundry and LB dressing as needed    Lifestyle   Autonomy pt was I with all ADL's/IADLs driving, shopping, and S sister with medications   Reciprocal Relationships sister/family   Service to Others retired   Intrinsic Gratification reading   Robert Anglin [address] ([address]) [facility]   Where Christopher [phone] Independent   Grooming Assistance 5  Supervision/Setup   Grooming Deficit Setup   UB Bathing Assistance 5  Supervision/Setup   LB Bathing Assistance 3  Moderate Assistance   LB Bathing Deficit Buttocks;Right lower leg including foot; Left lower leg including foot   UB Dressing Assistance 4  Minimal Assistance   LB Dressing Assistance 2  Maximal Assistance   Toileting Assistance  1  Total Assistance   Toileting Deficit Perineal hygiene  (pt reports needs assistance today due to IV lines)   Transfers   Sit to Stand 5  Supervision   Stand to Sit 5  Supervision   Additional items Verbal cues  (safe hand placement)   Toilet transfer 5  Supervision   Additional items Other  (using grab bar, pt reports toilet is higher at home/commode)   Functional Mobility   Functional Mobility 5  Supervision   Additional Comments steady, no lose of balance with evaluation   Additional items Rolling walker   Balance   Static Sitting Fair +   Dynamic Sitting Fair   Static Standing Fair   Activity Tolerance   Activity Tolerance Patient tolerated treatment well   Medical Staff Made Aware JUS Dexter cleared pt for therapy, +Sofia MUNROE   Nurse Made Aware yes, Eduarda LUU   RUE Assessment   RUE Assessment X  (shoulder flexion 0-90*)   LUE Assessment   LUE Assessment X  (shoulder flexion 0-80*, difficulty with external rotation)   Hand Function   Gross Motor Coordination Functional   Fine Motor Coordination Functional  (finger to thumb sequencing, able to cut foods)   Vision-Basic Assessment   Current Vision Wears glasses for distance only   Vision - Complex Assessment   Acuity Able to read clock/calendar on wall without difficulty   Cognition   Overall Cognitive Status [facility]   Arousal/Participation Alert; Cooperative   Attention Within functional limits   Memory Within functional limits   Following Commands Follows all commands and directions without difficulty   Assessment   Limitation Decreased ADL status; Decreased high-level ADLs; Decreased self-care trans;Decreased endurance   Prognosis Good   Assessment Pt is a 65 y o  female who was admitted to [facility] on 10/9/2018 with Severe aortic stenosis and now s/p TAVR on 10/9/18   Pt's problem list also includes PMH of HTN, hyperlipidemia, hypotension, morbid obesity, JANICE, anemia, CHF, COPD on 2L of 02 at all times, CAD, UTI, uterine cancer   At baseline pt was completing ADL's with AD for LB self care/IADL's with I, assisting sister with medication management and meals, sister does laundry  Pt lives in a [facility] with sister, +RW with mobility, shower chair for tub, enjoys reading  Currently pt requires min a for UB self care, max a for LB self care tasks and S and RW for functional mobility/transfers  Pt currently presents with impairments in the following categories -limited home support, difficulty performing ADLS, difficulty performing IADLS  and environment activity tolerance, endurance, standing balance/tolerance and sitting balance/tolerance  These impairments, as well as pt's fatigue and cardiac/sternal precautions  limit pt's ability to safely engage in all baseline areas of occupation, includingdressing, toileting, functional mobility/transfers, community mobility, house maintenance, meal prep and cleaning From OT standpoint, recommend  Skilled OT services during this admission to ensure I with self care tasks and energy conservation tech and endurance for all IADL's, and home with home therapy and family support upon D/C  OT will continue to follow to address the below stated goals  Goals   Patient Goals to go home    LTG Time Frame 7-10   Long Term Goal #1 see established goals   Plan   Goal Expiration Date 10/20/18   OT Frequency 3-5x/wk   Additional Treatment Session   Start Time 1651   End Time 1701   Treatment Assessment Pt participated in additional OT session focusing on cardiac education   Provided pt with Recovering After Cardiac Surgery packet and educated pt regarding; cardiac precautions, lifiting restrictions, safe activity engagement, energy conservation, lifestyle modifications, stress management and cardiac rehabilitation programs  Pt's questions were addressed after discussion of the packet  Provided pt with contact information for OT department if questions arrise  Pt is limited by decreased endurance and decreased ability to complete LB self care tasks and toileting/pericare and higher level ADLs  Patient takes care of sister for higher level ADL's and with medication management  Pt reports sister is currently in the hospital for "breathing problems"  Recommend skilled OT for LB self care task I with AD PRN, toileting, energy conservation tech before returning home  Recommend home health OT to follow patient to ensure safety and I in home environment     Recommendation   OT Discharge Recommendation Home Health Agency   OT - OK to Discharge (yes, when medically able)   Barthel Index   Feeding 10   Bathing 0   Grooming Score 5   Dressing Score 0   Bladder Score 10   Bowels Score 10   Toilet Use Score 5   Transfers (Bed/Chair) Score 10   Mobility (Level Surface) Score 10   Stairs Score 0   Barthel Index Score 60   Modified Yancy Scale   Modified [place] Scale 3       OCCUPATIONAL THERAPY GOALS:    *MOD I ADLS AFTER SETUP WITH USE OF ADAPTIVE DEVICES PRN  *MOD I TOILETING AND CLOTHING MANAGEMENT   *MOD I FUNCTIONAL MOB AND TRANSFERS TO ALL SURFACES WITH FAIR+ TO GOOD BALANCE/SAFETY FOR PARTICIPATION IN DYNAMIC ADLS   *DEMONSTRATE GOOD CARRYOVER WITH SAFE USE OF RW DURING FUNCTIONAL TASKS  *ASSESS DME NEEDS  *INCREASE ACTIVITY TOLERANCE TO 20-25 MIN FOR PARTICIPATION IN ADLS AND ENJOYABLE ACTIVITIES     *PT TO PARTICIPATE IN ONGOING FUNCTIONAL COGNITIVE ASSESSMENT WITH GOOD ATTENTION/PARTICIPATION TO ASSIST WITH SAFE D/C RECOMMENDATIONS     Miles Omalley, OT

## 2018-10-10 NOTE — PLAN OF CARE
Problem: OCCUPATIONAL THERAPY ADULT  Goal: Performs self-care activities at highest level of function for planned discharge setting  See evaluation for individualized goals  See flowsheet documentation for full assessment, interventions and recommendations  Limitation: Decreased ADL status, Decreased high-level ADLs, Decreased self-care trans, Decreased endurance  Prognosis: Good  Assessment: Pt is a 65 y o  female who was admitted to [facility] on 10/9/2018 with Severe aortic stenosis and now s/p TAVR on 10/9/18   Pt's problem list also includes PMH of HTN, hyperlipidemia, hypotension, morbid obesity, JANICE, anemia, CHF, COPD on 2L of 02 at all times, CAD, UTI, uterine cancer   At baseline pt was completing ADL's with AD for LB self care/IADL's with I, assisting sister with medication management and meals, sister does laundry  Pt lives in a [facility] with sister, +RW with mobility, shower chair for tub, enjoys reading  Currently pt requires min a for UB self care, max a for LB self care tasks and S and RW for functional mobility/transfers  Pt currently presents with impairments in the following categories -limited home support, difficulty performing ADLS, difficulty performing IADLS  and environment activity tolerance, endurance, standing balance/tolerance and sitting balance/tolerance  These impairments, as well as pt's fatigue and cardiac/sternal precautions  limit pt's ability to safely engage in all baseline areas of occupation, includingdressing, toileting, functional mobility/transfers, community mobility, house maintenance, meal prep and cleaning From OT standpoint, recommend  Skilled OT services during this admission to ensure I with self care tasks and energy conservation tech and endurance for all IADL's, and home with home therapy and family support upon D/C  OT will continue to follow to address the below stated goals        OT Discharge Recommendation: [address] to Discharge:  (yes, when medically able)

## 2018-10-10 NOTE — PROGRESS NOTES
Progress Note - Critical Care  Guero Rolando 65 y o  female MRN: [id number]  Unit/Bed#: [facility] 817-77 Encounter: [id number]    Attending Physician: Ayleen Bailey MD    24 Hour Events: POD # 1 s/p TF TAVR  Was difficult to wean from vent    Received 1 dose of lasix to optimize and was extubated to BiPAP early this AM       Medications:   Scheduled Meds:  Current Facility-Administered Medications:  acetaminophen 650 mg Rectal Q4H PRN Fide Coronel, PA-NITO    acetaminophen 650 mg Oral Q4H PRN Fide Coronel, PA-NITO    aspirin 81 mg Oral Daily JAKE Brand-NITO    benzonatate 200 mg Oral TID PRN JAKE Brand-NITO    bisacodyl 10 mg Rectal Daily PRN Fide Coronel, PA-NITO    calcium chloride 1 g Intravenous Once JAKE Brand-NITO    cefazolin 2,000 mg Intravenous Q8H JAKE Brand-NITO Last Rate: 2,000 mg (10/10/18 0020)   chlorhexidine 15 mL Swish & Spit BID Fide Coronel PA-C    clopidogrel 75 mg Oral Daily Fide Coronel PA-C    dexmedetomidine 0 1-0 7 mcg/kg/hr Intravenous Titrated Rhiannon Rodriguez PA-C Last Rate: Stopped (10/10/18 0604)   docusate sodium 100 mg Oral BID Fide Coronel PA-C    fentanyl citrate (PF) 50 mcg Intravenous Q1H PRN Fide Coronel PA-C    fondaparinux 2 5 mg Subcutaneous Daily JAKE Brand-NITO    insulin lispro 1-5 Units Subcutaneous TID AC Amina Jenkins PA-C    insulin lispro 1-5 Units Subcutaneous HS Fide Coronel PA-C    levothyroxine 50 mcg Oral Daily JAKE Brand-NITO    loratadine 10 mg Oral Daily Fide Coronel PA-C    metoprolol succinate 25 mg Oral Daily Fide Coronel, SHARI    mupirocin 1 application Nasal [address] Fide Coronel PA-C    niCARdipine 2 5-15 mg/hr Intravenous Titrated JAKE Brand-NITO Last Rate: 4 mg/hr (10/10/18 0507)   ondansetron 4 mg Intravenous Q6H PRN Fide Coronel PA-C    pantoprazole 40 mg Oral Daily Fide Coronel PA-C    polyethylene glycol 17 g Oral Daily Fide Coronel PA-C    potassium chloride 20 mEq Intravenous Once PRN Neomia SHARI Rodgers Last Rate: 20 mEq (10/10/18 0518)   potassium chloride 20 mEq Intravenous Q1H PRN Neovioleta Rodgers PA-C    pravastatin 80 mg Oral HS Neovioleta Rodgers PA-C    sertraline 50 mg Oral Daily Neovioleta Rodgers PA-C        VTE Pharmacologic Prophylaxis: Fondaparinux (Arixtra)  VTE Mechanical Prophylaxis: sequential compression device    Continuous Infusions:    dexmedetomidine 0 1-0 7 mcg/kg/hr Last Rate: Stopped (10/10/18 0604)   niCARdipine 2 5-15 mg/hr Last Rate: 4 mg/hr (10/10/18 0507)     PRN Meds:    acetaminophen 650 mg Q4H PRN   acetaminophen 650 mg Q4H PRN   benzonatate 200 mg TID PRN   bisacodyl 10 mg Daily PRN   fentanyl citrate (PF) 50 mcg Q1H PRN   ondansetron 4 mg Q6H PRN   potassium chloride 20 mEq Once PRN   potassium chloride 20 mEq Q1H PRN       Vitals:   Vitals:    10/10/18 0333 10/10/18 0400 10/10/18 0551 10/10/18 0600   BP:  139/59  (!) 134/48   Pulse:  64  70   Resp:  16  (!) 27   Temp:  100 °F (37 8 °C)  100 °F (37 8 °C)   TempSrc:  Probe  Probe   SpO2: 98% 98% 96% 98%   Weight:    98 5 kg (217 lb 2 5 oz)   Height:         Arterial Line BP: 130/44  Arterial Line MAP (mmHg): 72 mmHg    Tele Rhythm: NSR This was personally reviewed by myself  Respiratory:  SpO2: SpO2: 98 %  BiPAP 10/5 40%  Temperature: Temp (24hrs), Av 3 °F (36 8 °C), Min:97 °F (36 1 °C), Max:100 °F (37 8 °C)  Current: Temperature: 100 °F (37 8 °C)    Weights:   Weight (last 2 days)     Date/Time   Weight    10/10/18 0600  98 5 (217 15)    10/09/18 1048  98 (216)            IBW: 34 kg  Body mass index is 50 47 kg/m²  Hemodynamic Monitoring:  N/A       Intake and Outputs:    Intake/Output Summary (Last 24 hours) at 10/10/18 0714  Last data filed at 10/10/18 0600   Gross per 24 hour   Intake          2345 61 ml   Output             1940 ml   Net           405 61 ml     I/O last 24 hours:   In:  6 [I V :2095; IV Piggyback:250]  Out:  [Urine:]    UOP: 1 5cc/kg/hour Labs:    Results from last 7 days  Lab Units 10/10/18  0404 10/10/18  0017 10/09/18  1624 10/09/18  1558   WBC Thousand/uL 11 78*  --   --   --    HEMOGLOBIN g/dL 9 2* 9 0*  --  8 5*   I STAT HEMOGLOBIN g/dl  --   --  8 5*  --    HEMATOCRIT % 30 8* 30 6* 25* 29 3*   PLATELETS Thousands/uL 352  --   --  331       Results from last 7 days  Lab Units 10/10/18  0404 10/10/18  0017 10/09/18  2002 10/09/18  1624 10/09/18  1558  10/09/18  1531 10/09/18  1513   SODIUM mmol/L 140  --   --   --  140  --   --   --    POTASSIUM mmol/L 3 9 3 6 4 0  --  4 1  < >  --   --    CHLORIDE mmol/L 107  --   --   --  108  --   --   --    CO2 mmol/L 25  --   --   --  29  --   --   --    BUN mg/dL 15  --   --   --  21  --   --   --    CREATININE mg/dL 0 67  --   --   --  0 68  --   --   --    CALCIUM mg/dL 8 3  --   --   --  8 1*  --   --   --    GLUCOSE, ISTAT mg/dl  --   --   --  101  --   --  97 95   < > = values in this interval not displayed  Baseline creat 0 9            AM CXR: Improved pulmonary vascular congestion, line in appropriate position This was personally reviewed by myself  AM EKG: NSR This was personally reviewed by myself  Physical Exam:    General Appearance:  NAD, resting comfortably on BiPAP  HENT: Normocephalic, atraumatic  Eyes: without icterus  Cardiac: Regular rate and rhythm, no murmur  Pulmonary: Diminished to auscultation bilaterally  Gastrointestinal: Soft, Obese,  nontender to palpation  : Tate present: yes   Musculoskeletal: 1+ edema bilaterally  +1 bilateral DP pulses  Neuro:  Screening neurologic exam reveals without obvious deficits  Following 2 step commands  Psych: Mood and affect WNL  Skin: warm and dry  Incisions: Inguinal puncture site is clean, dry, and intact  Invasive lines and devices:   Invasive Devices     Central Venous Catheter Line            CVC Central Lines 10/09/18 Triple less than 1 day          Peripheral Intravenous Line Peripheral IV 10/09/18 Left;Dorsal (posterior) Hand less than 1 day    Peripheral IV 10/09/18 Left;Ventral (anterior) Antecubital less than 1 day          Arterial Line            Arterial Line 10/09/18 Right Radial less than 1 day          Drain            Urethral Catheter Non-latex; Temperature probe 16 Fr  less than 1 day          Airway            ETT  Cuffed;Oral;Hi-Lo 7 mm less than 1 day                Assessment:  Active Problems:    S/P TAVR (transcatheter aortic valve replacement)    Chronic diastolic congestive heart failure (HCC)    LVH (left ventricular hypertrophy)    JANICE on CPAP    COPD, mild (HCC)    Coronary artery disease    AVB (atrioventricular block)    Hypertension    Hyperlipidemia    Hypothyroid    Obesity, morbid (HCC)    Anemia    Gastroesophageal reflux disease without esophagitis    Arthritis    Acute pulmonary insufficiency      Plan:    Cardiac:    Cardiac infusions: None    D/C arterial line    MAP goal >65 but SBP goal <130  NSR; HR/BP well-controlled  Start lopressor 25 mg PO BID  Continue ASA and statin therapy  ASA/Plavix  Consult cardiology for postoperative medical management  Follow up transthoracic echocardiogram today  Continue DVT prophylaxis    Pulmonary:    Wean BiPAP as able with SpO2 gaol >92%   Continue IS and deep breathing exercises  Renal:   Trend BUN and creat  Trend UOP  D/C Tate  Lasix 40 QD    Neuro:  Neurologically intact  Continue prn Tylenol for pain    GI:  Start Cardiac TLC 2 3 gm sodium diet with 1800 mL fluid restriction  Continue bowel regimen  Continue GI prophylaxis with PPI    Endo:    SSI    Hematology:   Trend hgb and platelets PRN    Disposition:  Transfer from ICU to telemetry today  Discharge home tomorrow when postoperative echocardiogram clear    Collaborative bedside rounds performed with cardiac surgery attending and bedside RN      SIGNATURE: Sebastien Roberts PA-C  DATE: October 10, 2018  TIME: 7:14 AM

## 2018-10-10 NOTE — CONSULTS
Consultation - Cardiology Team One  Shaan Raphael 65 y o  female MRN: [id number]  Unit/Bed#: [facility] 418-01 Encounter: [id number]    Inpatient consult to Cardiology  Consult performed by: Savanna Drummond ordered by: Kevon Mixon      Physician Requesting Consult: Marty Porter MD  Reason for Consult / Principal Problem: S/p TAVR     Assessment    1  Severe aortic stenosis   2  Chronic diastolic heart failure  3  Hypertension   4  Hyperlipidemia     Plan    POD # 1 s/p TAVR with a 23 mm Cornelius NOE 3 bioprosthetic valve via L TF approach  No complications   Reviewed ECG and telemetry showing NSR    Continue metoprolol XL, aspirin and plavix  Diuretics per primary team  Currently on furosemide 40 mg IV daily  Repeat echocardiogram pending   Monitor I/Os and daily weights   Will continue to follow     History of Present Illness   HPI: Shaan Raphael is a 65y o  year old female who has a history of chronic diastolic heart failure, severe aortic stenosis, hypertension, hyperlipidemia, pulmonary hypertension, morbid obesity and JANICE  She follows with cardiologist Dr Marisa Braswell  She [facility] for elective TAVR  She is POD #1 s/p TAVR with a 23 mm Cornelius NOE 3 bioprosthetic valve via L TF approach  No complications  Final GARRISON showed a well positioned bioprosthetic transcatheter aortic valve with normal function and no perivalvular leak  GARRISON 8/27/18 showed EF 60%, no regional wall motion abnormalities, mild MR, severe aortic stenosis with mild AI  Cardiac cath 8/16/18 showed no obstructive CAD  EKG reviewed personally:  Normal sinus rhythm   Ventricular rate 66 bpm  DE Interval 200 ms  QRSD interval 75 ms  QT interval 425 ms  QTc interval 446 ms    Telemetry reviewed personally:   Normal sinus rhythm HR 60-70s, no ectopy     Review of Systems   Constitution: Negative for chills and fever     Cardiovascular: Negative for chest pain, dyspnea on exertion, leg swelling and orthopnea  Respiratory: Negative for shortness of breath  Gastrointestinal: Negative for nausea and vomiting  Neurological: Negative for dizziness and light-headedness  Psychiatric/Behavioral: Negative for altered mental status  Historical Information   Past Medical History:   Diagnosis Date    Anemia 08/22/2018    Arthritis     AVB (atrioventricular block)     first degree    CHF (congestive heart failure) (HCC)     COPD, mild (HCC)     Coronary artery disease     Dislocation of right shoulder joint     Frequent UTI     GERD (gastroesophageal reflux disease)     H/O: pneumonia     Heme positive stool     History of uterine cancer     s/p CALLIE    Hyperlipidemia     Hypertension     Hypothyroidism     Obesity, morbid ([facility] [address] ) 08/22/2018    JANICE on CPAP     Pulmonary hypertension ([facility] [address] ) 08/22/2018    Severe aortic stenosis     Simple goiter     Skin cyst     within the armpits, right     Past Surgical History:   Procedure Laterality Date    BREAST BIOPSY      CARDIAC CATHETERIZATION      CARPAL TUNNEL RELEASE Bilateral     CHOLECYSTECTOMY      DILATION AND CURETTAGE OF UTERUS      HYSTEROSCOPY      NE COLONOSCOPY FLX DX W/COLLJ SPEC WHEN PFRMD N/A 9/6/2018    Procedure: COLONOSCOPY;  Surgeon: Manley Babinski, MD;  Location: [facility]; Service: Gastroenterology    NE ECHO TRANSESOPHAG R-T 2D W/PRB IMG ACQUISJ I&R N/A 10/9/2018    Procedure: INTRA-OP TRANSESOPHAGEAL ECHOCARDIOGRAM (GARRISON); Surgeon: Karime Crane DO;  Location:  MAIN OR;  Service: Cardiac Surgery    NE ESOPHAGOGASTRODUODENOSCOPY TRANSORAL DIAGNOSTIC N/A 8/31/2018    Procedure: ESOPHAGOGASTRODUODENOSCOPY (EGD); Surgeon: Manley Babinski, MD;  Location: [facility]; Service: Gastroenterology    NE REPLACE AORTIC VALVE OPENFEMORAL ARTERY APPROACH N/A 10/9/2018    Procedure: REPLACEMENT AORTIC VALVE TRANSCATHETER (TAVR) TRANSFEMORAL W/ 23 MM MENDOZA NOE S3 VALVE (ACCESS OF LEFT);   Surgeon: Alejandro Guerra DO;  Location: [facility];  Service: Cardiac Surgery    TOTAL ABDOMINAL HYSTERECTOMY      TOTAL HIP ARTHROPLASTY Left 2007    TOTAL KNEE ARTHROPLASTY Bilateral      History   Alcohol Use No     History   Drug Use No     History   Smoking Status    Never Smoker   Smokeless Tobacco    Never Used     Family History:   Family History   Problem Relation Age of Onset    Diabetes Mother     Stroke Mother     Cancer Father     Lung cancer Father     Diabetes Sister     Heart disease Sister     Coronary artery disease Family     Diabetes Family     Hypertension Family     Cancer Family     Stroke Family        Meds/Allergies   all current active meds have been reviewed and current meds:   Current Facility-Administered Medications   Medication Dose Route Frequency    acetaminophen (TYLENOL) tablet 650 mg  650 mg Oral Q4H PRN    aspirin (ECOTRIN LOW STRENGTH) EC tablet 81 mg  81 mg Oral Daily    benzonatate (TESSALON PERLES) capsule 200 mg  200 mg Oral TID PRN    bisacodyl (DULCOLAX) rectal suppository 10 mg  10 mg Rectal Daily PRN    ceFAZolin (ANCEF) IVPB (premix) 2,000 mg  2,000 mg Intravenous Q8H    clopidogrel (PLAVIX) tablet 75 mg  75 mg Oral Daily    docusate sodium (COLACE) capsule 100 mg  100 mg Oral BID    fondaparinux (ARIXTRA) subcutaneous injection 2 5 mg  2 5 mg Subcutaneous Daily    furosemide (LASIX) injection 40 mg  40 mg Intravenous Daily    insulin lispro (HumaLOG) 100 units/mL subcutaneous injection 1-5 Units  1-5 Units Subcutaneous TID AC    insulin lispro (HumaLOG) 100 units/mL subcutaneous injection 1-5 Units  1-5 Units Subcutaneous HS    levothyroxine tablet 50 mcg  50 mcg Oral Daily    loratadine (CLARITIN) tablet 10 mg  10 mg Oral Daily    metoprolol succinate (TOPROL-XL) 24 hr tablet 25 mg  25 mg Oral Daily    mupirocin (BACTROBAN) 2 % nasal ointment 1 application  1 application Nasal N87U [address]    ondansetron (ZOFRAN) injection 4 mg  4 mg Intravenous Q6H PRN    pantoprazole (PROTONIX) EC tablet 40 mg  40 mg Oral Daily    polyethylene glycol (MIRALAX) packet 17 g  17 g Oral Daily    potassium chloride (K-DUR,KLOR-CON) CR tablet 20 mEq  20 mEq Oral Daily    pravastatin (PRAVACHOL) tablet 80 mg  80 mg Oral HS    sertraline (ZOLOFT) tablet 50 mg  50 mg Oral Daily          Allergies   Allergen Reactions    Latex Rash    Neosporin [Neomycin-Bacitracin Zn-Polymyx] Rash and Other (See Comments)     hives per [facility] order       Objective   Vitals: Blood pressure 126/63, pulse 66, temperature 99 7 °F (37 6 °C), temperature source Probe, resp  rate (!) 29, height 4' 7" (1 397 m), weight 98 5 kg (217 lb 2 5 oz), SpO2 98 %  ,     Body mass index is 50 47 kg/m²  ,     Systolic (58UGV), JSJ:535 , Min:125 , MUK:052     Diastolic (53NQX), RAD:51, Min:47, Max:75      Intake/Output Summary (Last 24 hours) at 10/10/18 0934  Last data filed at 10/10/18 0900   Gross per 24 hour   Intake          2564 94 ml   Output             2070 ml   Net           494 94 ml     Weight (last 2 days)     Date/Time   Weight    10/10/18 0600  98 5 (217 15)    10/09/18 1048  98 (216)            Invasive Devices     Central Venous Catheter Line            CVC Central Lines 10/09/18 Triple less than 1 day          Peripheral Intravenous Line            Peripheral IV 10/09/18 Left;Dorsal (posterior) Hand less than 1 day    Peripheral IV 10/09/18 Left;Ventral (anterior) Antecubital less than 1 day          Airway            ETT  Cuffed;Oral;Hi-Lo 7 mm less than 1 day              Physical Exam   Constitutional: No distress  Neck: Neck supple  No JVD present  Cardiovascular: Normal rate, regular rhythm and intact distal pulses  Pulmonary/Chest: Effort normal  No respiratory distress  She has no wheezes  Course   No crackles  NC    Abdominal: Soft  Bowel sounds are normal    Morbid obesity    Musculoskeletal: She exhibits no edema  Neurological: She is alert  Skin: Skin is warm and dry   She is not diaphoretic  Psychiatric: She has a normal mood and affect   Her behavior is normal        LABORATORY RESULTS:      CBC with diff:   Results from last 7 days  Lab Units 10/10/18  0404 10/10/18  0017 10/09/18  1624 10/09/18  1558 10/09/18  1531 10/09/18  1513 10/09/18  1453   WBC Thousand/uL 11 78*  --   --   --   --   --   --    HEMOGLOBIN g/dL 9 2* 9 0*  --  8 5*  --   --   --    I STAT HEMOGLOBIN g/dl  --   --  8 5*  --  8 2* 8 5* 8 5*   HEMATOCRIT % 30 8* 30 6* 25* 29 3* 24* 25* 25*   MCV fL 80*  --   --   --   --   --   --    PLATELETS Thousands/uL 352  --   --  331  --   --   --    MCH pg 24 0*  --   --   --   --   --   --    MCHC g/dL 29 9*  --   --   --   --   --   --    RDW % 18 8*  --   --   --   --   --   --    MPV fL 9 6  --   --  9 2  --   --   --        CMP:  Results from last 7 days  Lab Units 10/10/18  0404 10/10/18  0017 10/09/18  2002 10/09/18  1624 10/09/18  1558 10/09/18  1531 10/09/18  1513 10/09/18  1453   SODIUM mmol/L 140  --   --   --  140  --   --   --    POTASSIUM mmol/L 3 9 3 6 4 0  --  4 1  --   --   --    CHLORIDE mmol/L 107  --   --   --  108  --   --   --    CO2 mmol/L 25  --   --   --  29  --   --   --    BUN mg/dL 15  --   --   --  21  --   --   --    CREATININE mg/dL 0 67  --   --   --  0 68  --   --   --    GLUCOSE, ISTAT mg/dl  --   --   --  101  --  97 95 90   CALCIUM mg/dL 8 3  --   --   --  8 1*  --   --   --    EGFR ml/min/1 73sq m 84  --   --   --  84  --   --   --        BMP:  Results from last 7 days  Lab Units 10/10/18  0404 10/10/18  0017 10/09/18  2002 10/09/18  1624 10/09/18  1558   SODIUM mmol/L 140  --   --   --  140   POTASSIUM mmol/L 3 9 3 6 4 0  --  4 1   CHLORIDE mmol/L 107  --   --   --  108   CO2 mmol/L 25  --   --   --  29   BUN mg/dL 15  --   --   --  21   CREATININE mg/dL 0 67  --   --   --  0 68   GLUCOSE, ISTAT mg/dl  --   --   --  101  --    CALCIUM mg/dL 8 3  --   --   --  8 1*     Lab Results   Component Value Date    NTBNP 207 08/13/2018 NTBNP 105 08/13/2018    NTBNP 209 12/02/2017         Lipid Profile:   No results found for: CHOL  Lab Results   Component Value Date    HDL 59 08/15/2018    HDL 65 (H) 06/05/2018     Lab Results   Component Value Date    LDLCALC 72 08/15/2018    LDLCALC 51 06/05/2018     Lab Results   Component Value Date    TRIG 112 08/15/2018    TRIG 83 06/05/2018     Cardiac testing:   Results for orders placed in visit on 09/05/18   Echo complete with contrast if indicated     Imaging: I have personally reviewed pertinent reports  Xr Chest Portable    Result Date: 10/10/2018  Narrative: CHEST INDICATION:   Post Open Heart Surgey  COMPARISON:  Chest x-ray performed the previous day  EXAM PERFORMED/VIEWS:  XR CHEST PORTABLE FINDINGS:  ET tube tip 3 cm above the suzie  Right IJ catheter tip in right atrium  Prosthetic aortic valve  Cardiomediastinal silhouette appears unremarkable  The lungs are clear  No pneumothorax or pleural effusion  Osseous structures appear within normal limits for patient age  Impression: Appropriate lines and tubes  Workstation performed: [id number]     Xr Chest Portable Icu    Result Date: 10/9/2018  Narrative: CHEST INDICATION:   S/P TAVR  COMPARISON:  Chest x-ray dated August 13, 2018  EXAM PERFORMED/VIEWS:  XR CHEST PORTABLE ICU FINDINGS: Interval postoperative changes of aortic valve replacement are noted  There has been interval placement of an endotracheal tube with the tip above the level of the suzie in appropriate position  There is a right-sided IJ catheter with the tip above the level of the right atrium  Lung markings are crowded secondary to low lung volumes  Within limitations of this examination there is no focal airspace opacity to suggest pneumonia  There is mild bibasilar atelectasis  No pneumothorax or pleural effusion  Osseous structures appear within normal limits for patient age       Impression: Endotracheal tube with the tip above the level of the suzie in appropriate position  Poor inspiratory effort with bibasilar atelectasis  However, no focal infiltrate or pleural effusion  Interval placement of a right IJ catheter with tip at the level of the right atrium  Workstation performed: [id number]     Thank you for allowing us to participate in this patient's care  This pt will follow up with Dr Merlyn Hopkins once discharged  Counseling / Coordination of Care  Total floor / unit time spent today 45 minutes  Greater than 50% of total time was spent with the patient and / or family counseling and / or coordination of care  A description of the counseling / coordination of care: Review of history, current assessment, development of a plan  Code Status: Level 1 - Full Code    ** Please Note: Dragon 360 Dictation voice to text software may have been used in the creation of this document   **

## 2018-10-10 NOTE — PROGRESS NOTES
Progress Note - Cardiothoracic Surgery   Yoli Nuñez 65 y o  female MRN: [id number]  Unit/Bed#: [facility] 418-01 Encounter: [id number]      POD # 1 s/p TAVR    Pt seen/examined  Interval history and data reviewed with critical care  Pt doing well  No specific complaints  No vasoactive gtts  Extubated to BiPAP        Medications:   Scheduled Meds:  Current Facility-Administered Medications:  acetaminophen 650 mg Rectal Q4H PRN Hemp 4 [employer]imon [employer], PA-NITO    acetaminophen 650 mg Oral Q4H PRN Hemp 4 Haitimon [employer], SHARI    aspirin 81 mg Oral Daily Clemon Games, PA-NITO    benzonatate 200 mg Oral TID PRN Hemp 4 Haitimon [employer], PA-NITO    bisacodyl 10 mg Rectal Daily PRN Hemp 4 Haitimon [employer], SHARI    calcium chloride 1 g Intravenous Once Clemon [employer], SHARI    cefazolin 2,000 mg Intravenous Q8H Clemon [employer], SHARI Last Rate: 2,000 mg (10/10/18 0730)   chlorhexidine 15 mL Swish & Spit BID Hemp 4 Haitimon [employer], SHARI    clopidogrel 75 mg Oral Daily Hemp 4 Haitimon [employer], SHARI    dexmedetomidine 0 1-0 7 mcg/kg/hr Intravenous Titrated Sudha Leigh PA-C Last Rate: Stopped (10/10/18 0604)   docusate sodium 100 mg Oral BID Clemon [employer], SHARI    fentanyl citrate (PF) 50 mcg Intravenous Q1H PRN [employer], SHARI    fondaparinux 2 5 mg Subcutaneous Daily Hemp 4 Haitimon [employer], SHARI    insulin lispro 1-5 Units Subcutaneous TID JOSE Jenkins, SHARI    insulin lispro 1-5 Units Subcutaneous HS Hemp 4 Haitimon [employer], SHARI    levothyroxine 50 mcg Oral Daily Hemp 4 Haitimon [employer], PA-NITO    loratadine 10 mg Oral Daily Hemp 4 Haitimon [employer], PA-NITO    metoprolol succinate 25 mg Oral Daily Hemp 4 Haitimon [employer], PA-NITO    mupirocin 1 application Nasal P60F [address] Clemon [employer], SHARI    niCARdipine 2 5-15 mg/hr Intravenous Titrated Coymon Pauline, SHARI Last Rate: 2 mg/hr (10/10/18 0728)   ondansetron 4 mg Intravenous Q6H PRN [employer], PA-NITO    pantoprazole 40 mg Oral Daily Hemp 4 Haitimon [employer], PA-C    polyethylene glycol 17 g Oral Daily Arlene Carpenter PA-C    potassium chloride 20 mEq Intravenous Q1H PRN Howard Macias PA-C    pravastatin 80 mg Oral HS Howard Macias PA-C    sertraline 50 mg Oral Daily Howard Macias PA-C      Continuous Infusions:  dexmedetomidine 0 1-0 7 mcg/kg/hr Last Rate: Stopped (10/10/18 0604)   niCARdipine 2 5-15 mg/hr Last Rate: 2 mg/hr (10/10/18 0728)       Vitals: Blood pressure (!) 134/48, pulse 70, temperature 100 °F (37 8 °C), temperature source Probe, resp  rate (!) 27, height 4' 7" (1 397 m), weight 98 5 kg (217 lb 2 5 oz), SpO2 98 %  ,Body mass index is 50 47 kg/m²  I/O last 24 hours: In: 2345 6 [I V :2095 6; IV Piggyback:250]  Out: 1940 [Urine:1940]  Invasive Devices     Central Venous Catheter Line            CVC Central Lines 10/09/18 Triple less than 1 day          Peripheral Intravenous Line            Peripheral IV 10/09/18 Left;Dorsal (posterior) Hand less than 1 day    Peripheral IV 10/09/18 Left;Ventral (anterior) Antecubital less than 1 day          Arterial Line            Arterial Line 10/09/18 Right Radial less than 1 day          Drain            Urethral Catheter Non-latex; Temperature probe 16 Fr  less than 1 day          Airway            ETT  Cuffed;Oral;Hi-Lo 7 mm less than 1 day                Physical Exam:   AAOx3  NAD  RRR  CTA B/L  Abd soft  Ext's no edema    Lab, Imaging and other studies:     Results from last 7 days  Lab Units 10/10/18  0404 10/10/18  0017 10/09/18  1624 10/09/18  1558   WBC Thousand/uL 11 78*  --   --   --    HEMOGLOBIN g/dL 9 2* 9 0*  --  8 5*   I STAT HEMOGLOBIN g/dl  --   --  8 5*  --    HEMATOCRIT % 30 8* 30 6* 25* 29 3*   PLATELETS Thousands/uL 352  --   --  331       Results from last 7 days  Lab Units 10/10/18  0404 10/10/18  0017 10/09/18  2002 10/09/18  1624 10/09/18  1558   SODIUM mmol/L 140  --   --   --  140   POTASSIUM mmol/L 3 9 3 6 4 0  --  4 1   CHLORIDE mmol/L 107  --   --   --  108   CO2 mmol/L 25  --   --   --  29   BUN mg/dL 15  --   --   --  21   CREATININE mg/dL 0 67  --   --   --  0 68 GLUCOSE, ISTAT mg/dl  --   --   --  101  --    CALCIUM mg/dL 8 3  --   --   --  8 1*         Recent Labs      10/10/18   0508   PHART  7 381   PSD3PEF  25 9   PO2ART  95 1   TER2XQP  44 7*   BEART  0 6           Plan:      ZEENAT Escobar/Lea  Transfer to floor  Ambulate  Incentive spirometry  OOB to chair  Diuresis  PO ASA/Statin/B blocker  Check Echo          Amara Gtz MD  DATE: October 10, 2018  TIME: 7:47 AM

## 2018-10-10 NOTE — PLAN OF CARE
Problem: DISCHARGE PLANNING - CARE MANAGEMENT  Goal: Discharge to post-acute care or home with appropriate resources  INTERVENTIONS:  - Conduct assessment to determine patient/family and health care team treatment goals, and need for post-acute services based on payer coverage, community resources, and patient preferences, and barriers to discharge  - Address psychosocial, clinical, and financial barriers to discharge as identified in assessment in conjunction with the patient/family and health care team  - Arrange appropriate level of post-acute services according to patient's   needs and preference and payer coverage in collaboration with the physician and health care team  - Communicate with and update the patient/family, physician, and health care team regarding progress on the discharge plan  - Arrange appropriate transportation to post-acute venues  - Return home and resume services w/ [facility]  Outcome: Progressing

## 2018-10-10 NOTE — SOCIAL WORK
CM met w/ pt to obtain info  Pt reported she resides w/ her sister in a 1-story house w/ no steps inside and a ramp to enter  Pt's friend Dorothey Sicard ([phone]) is primary contact  Pt reported she is independent at baseline w/ performing her ADLS, but uses a cane to ambulate  Pt reported no additional DME in home, but stated she has a Life Alert pendant  Pt reported she is currently open to [facility]  Pt reported no hx of i/p rehab placements  Pt reported no hx of mental health issues nor D&A issues  Pt reported her PCP is MABEL Figueredo through [facility] located in [facility]  Pt reported she uses [facility] located on [address] [facility] in [facility] for her Rx needs  Pt reported she receives [employer] benefits as a source of income  Pt is enrolled in Medicare for healthcare coverage and has Medicare Part-D for Rx benefits  Pt reported she has a Living Will which legally pre-designates her 's wife Moreno Saul ([phone]) as her medical POA in emergencies  Pt reported her friends/family can provide transportation for her at time of d/c     CM reviewed d/c planning process including the following: identifying help at home, patient preference for d/c planning needs, Discharge Lounge, [facility] Meds to Bed program, availability of treatment team to discuss questions or concerns patient and/or family may have regarding understanding medications and recognizing signs and symptoms once discharged  CM also encouraged patient to follow up with all recommended appointments after discharge  Patient advised of importance for patient and family to participate in managing patients medical well being  Patient/caregiver received discharge checklist  Content reviewed  Patient/caregiver encouraged to participate in discharge plan of care prior to discharge home  CM made Ecin referral to [facility] for them to follow her case while hospitalized  Pt is aware and agreeable to this   CM will reassess pt for additional d/c needs once recommendations for her aftercare plan are made by the tx team  CM to follow

## 2018-10-10 NOTE — RESPIRATORY THERAPY NOTE
RT Ventilator Management Note  Jamia Santos 65 y o  female MRN: [id number]  Unit/Bed#: [facility] 418-01 Encounter: [id number]      Daily Screen       10/9/2018 1752 10/9/2018 1858          Patient safety screen outcome[de-identified] - Passed      Spont breathing trial % for 30 min: - -      Spont breathing trial outcome[de-identified] Failed -      Previous settings resumed: Yes -      RSBI: 156 -              Physical Exam:   Assessment Type: Assess only  General Appearance: Sedated  Chest Assessment: Chest expansion symmetrical  Bilateral Breath Sounds: Clear      Resp Comments: (P) Pt  placed back on A/C  Pt  Not tolerating CPAP

## 2018-10-10 NOTE — RESPIRATORY THERAPY NOTE
RT Ventilator Management Note  Savana Martinez 65 y o  female MRN: [id number]  Unit/Bed#: [facility] 418-01 Encounter: [id number]      Daily Screen       10/9/2018 1752 10/9/2018 9875          Patient safety screen outcome[de-identified] - Passed      Spont breathing trial % for 30 min: - -      Spont breathing trial outcome[de-identified] Failed -      Previous settings resumed: Yes -      RSBI: 156 -              Physical Exam:   Assessment Type: Assess only  Bilateral Breath Sounds: Clear      Resp Comments: (P) Pt  extubated and placed on BIPAP  Pt  is tolerating the current settings with no increased WOB or SOB  Will cont  to monitor

## 2018-10-10 NOTE — PLAN OF CARE
Problem: PHYSICAL THERAPY ADULT  Goal: Performs mobility at highest level of function for planned discharge setting  See evaluation for individualized goals  Treatment/Interventions: Functional transfer training, LE strengthening/ROM, Therapeutic exercise, Endurance training, Bed mobility, Gait training, Spoke to nursing, Spoke to case management  Equipment Recommended: Jeannette Kessler (already has one)       See flowsheet documentation for full assessment, interventions and recommendations  Prognosis: Good  Problem List: Decreased strength, Decreased endurance, Impaired balance, Decreased mobility, Obesity  Assessment: Pt is 65 y o  female admitted with Dx of Severe aortic stenosis and underwent transcatheter aortic valve replacement via a percutaneous left transfemoral approach on 10/9/2018  Pt 's comorbidities affecting POC include: CHF, CAD, sleep apnea, HTN, obesity, and use of suppl O2 at home and personal factors of: 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  Pt's clinical presentation is currently unstable/unpredictable which is evident in abn lab values, ongoing telem monitoring while in a critical care unit, and need for min assist w/ all phases of mobility when usually mobilizing independently  Pt presents w/ generalized weakness, incl decreased LE strength, decreased functional endurance, and min impaired balance w/ associated gait dysfunction (likely near to premorbid level --> already uses rw at home)  Will cont to follow pt in PT for progressive mobilization to address above functional deficits and to max level of (I), endurance, and safety  Otherwise, anticipate pt will return home upon D/C provided she cont improving w/ mobility skills, safety, and endurance and when medically cleared; home PT follow up is recommended;  Barriers to Discharge: Decreased caregiver support     Recommendation: Home PT          See flowsheet documentation for full assessment

## 2018-10-10 NOTE — PHYSICAL THERAPY NOTE
PHYSICAL THERAPY NOTE          Patient Name: Yoli Nuñez  [id number]'Y Date: 10/10/2018  Time In: 13:00  Time Out: 13:15  Total Time:  15 min      S:  Pt is observed reclined in the chair; appears to be fatigued; agreeable to perform LE therex    O:  (B) LE therex performed in sitting (reclined) as following: (B) ankle pumps 2 x 10 reps, AROM; (B) SAQ 2 x 10 reps, (B) hip/knee flex/ext 2x 10 reps, AAROM; (B) SCDs on; pt was able to reposition self higher in the chair; call bell placed w/in reach    A:  Additional follow up consecutive session performed to initiate LE therex in order to max strength and to facilitate progression w/ functional mobility skills and overall level of (I)  Pt was able to complete the program w/ no excessive fatigue or increased discomfort expressed; mild GALINDO noted w/ O2 sat in the 90s %; pt remained in NAD and appeared to be comfortable at the end of session; currently, cont to recommend home PT follow up upon returning home when medically cleared; will follow        P:  Cont to follow 3-5x/week (M-F) for LE therex, functional mobility training, endurance training and pt education per Memo Marlow, PT

## 2018-10-10 NOTE — UTILIZATION REVIEW
Initial Clinical Review    Age/Sex: 65 y o  female    Surgery Date: 10/09/2018    Procedure: Transcatheter aortic valve replacement with a 23 mm Cornelius NOE 3 bioprosthetic valve via a percutaneous left transfemoral approach  Anesthesia: Dr Gabbi Schmidt, general endotracheal anesthesia with transesophageal echocardiogram guidance  Admission Orders: Date/Time/Statement: 10/9/18 @ 1021   Orders Placed This Encounter   Procedures    Inpatient Admission     Standing Status:   Standing     Number of Occurrences:   1     Order Specific Question:   Admitting Physician     Answer:   Paz Dose     Order Specific Question:   Level of Care     Answer:   Critical Care [15]     Order Specific Question:   Estimated length of stay     Answer:   More than 2 Midnights     Order Specific Question:   Certification     Answer:   I certify that inpatient services are medically necessary for this patient for a duration of greater than two midnights  See H&P and MD Progress Notes for additional information about the patient's course of treatment  Vital Signs: /63   Pulse 66   Temp 99 7 °F (37 6 °C) (Probe)   Resp (!) 29   Ht 4' 7" (1 397 m)   Wt 98 5 kg (217 lb 2 5 oz)   SpO2 98%   BMI 50 47 kg/m²     Diet:        Diet Orders            Start     Ordered    10/10/18 0818  Diet Cardiac; Cardiac TLC 2 3 GM NA; Fluid Restriction 1800 ML  Diet effective now     Question Answer Comment   Diet Type Cardiac    Cardiac Cardiac TLC 2 3 GM NA    Other Restriction(s): Fluid Restriction 1800 ML    RD to adjust diet per protocol?  Yes        10/10/18 0819      A line right radial / triple CVC Line /  TELM  Consult PT/OT  ECHO: pending  Neurovascular checks q1h    Mobility: Up with assistance / Up as tolerated / POD #1, OOB to chair and for all meals    DVT Prophylaxis: Jaime SCDs    Pain Control:   Pain Medications             aspirin (ECOTRIN LOW STRENGTH) 81 mg EC tablet Take 1 tablet by mouth daily sertraline (ZOLOFT) 50 mg tablet Take 50 mg by mouth daily

## 2018-10-10 NOTE — RESTORATIVE TECHNICIAN NOTE
Restorative Specialist Mobility Note       Activity: Ambulate in [place], Chair     Assistive Device: Front wheel walker

## 2018-10-10 NOTE — RESPIRATORY THERAPY NOTE
RT Ventilator Management Note  Teo Lopez 65 y o  female MRN: [id number]  Unit/Bed#: [facility] 418-01 Encounter: [id number]      Daily Screen       10/9/2018 1752 10/9/2018 1858          Patient safety screen outcome[de-identified] - Passed      Spont breathing trial % for 30 min: - -      Spont breathing trial outcome[de-identified] Failed -      Previous settings resumed: Yes -      RSBI: 156 -              Physical Exam:   Assessment Type: Assess only  General Appearance: Sedated  Chest Assessment: Chest expansion symmetrical      Resp Comments: (P) Pt  tried on CPAP wean  Vt<200ml, Ve<4  Pt  Placed back on A/C

## 2018-10-11 ENCOUNTER — TRANSITIONAL CARE MANAGEMENT (OUTPATIENT)
Dept: FAMILY MEDICINE CLINIC | Facility: CLINIC | Age: 78
End: 2018-10-11

## 2018-10-11 VITALS
DIASTOLIC BLOOD PRESSURE: 60 MMHG | TEMPERATURE: 97.5 F | RESPIRATION RATE: 18 BRPM | SYSTOLIC BLOOD PRESSURE: 123 MMHG | HEART RATE: 66 BPM | WEIGHT: 216.05 LBS | BODY MASS INDEX: 50 KG/M2 | HEIGHT: 55 IN | OXYGEN SATURATION: 99 %

## 2018-10-11 PROBLEM — D62 POSTOPERATIVE ANEMIA DUE TO ACUTE BLOOD LOSS: Status: ACTIVE | Noted: 2018-10-11

## 2018-10-11 LAB
ANION GAP SERPL CALCULATED.3IONS-SCNC: 6 MMOL/L (ref 4–13)
BUN SERPL-MCNC: 15 MG/DL (ref 5–25)
CALCIUM SERPL-MCNC: 8.3 MG/DL (ref 8.3–10.1)
CHLORIDE SERPL-SCNC: 106 MMOL/L (ref 100–108)
CO2 SERPL-SCNC: 27 MMOL/L (ref 21–32)
CREAT SERPL-MCNC: 0.61 MG/DL (ref 0.6–1.3)
ERYTHROCYTE [DISTWIDTH] IN BLOOD BY AUTOMATED COUNT: 19.2 % (ref 11.6–15.1)
GFR SERPL CREATININE-BSD FRML MDRD: 87 ML/MIN/1.73SQ M
GLUCOSE SERPL-MCNC: 103 MG/DL (ref 65–140)
GLUCOSE SERPL-MCNC: 120 MG/DL (ref 65–140)
GLUCOSE SERPL-MCNC: 98 MG/DL (ref 65–140)
HCT VFR BLD AUTO: 28.8 % (ref 34.8–46.1)
HGB BLD-MCNC: 8.3 G/DL (ref 11.5–15.4)
MCH RBC QN AUTO: 23.9 PG (ref 26.8–34.3)
MCHC RBC AUTO-ENTMCNC: 28.8 G/DL (ref 31.4–37.4)
MCV RBC AUTO: 83 FL (ref 82–98)
PLATELET # BLD AUTO: 313 THOUSANDS/UL (ref 149–390)
PMV BLD AUTO: 9.6 FL (ref 8.9–12.7)
POTASSIUM SERPL-SCNC: 3.8 MMOL/L (ref 3.5–5.3)
RBC # BLD AUTO: 3.48 MILLION/UL (ref 3.81–5.12)
SODIUM SERPL-SCNC: 139 MMOL/L (ref 136–145)
WBC # BLD AUTO: 9.13 THOUSAND/UL (ref 4.31–10.16)

## 2018-10-11 PROCEDURE — 80048 BASIC METABOLIC PNL TOTAL CA: CPT | Performed by: PHYSICIAN ASSISTANT

## 2018-10-11 PROCEDURE — 82948 REAGENT STRIP/BLOOD GLUCOSE: CPT

## 2018-10-11 PROCEDURE — 85027 COMPLETE CBC AUTOMATED: CPT | Performed by: PHYSICIAN ASSISTANT

## 2018-10-11 PROCEDURE — 97535 SELF CARE MNGMENT TRAINING: CPT

## 2018-10-11 PROCEDURE — G8988 SELF CARE GOAL STATUS: HCPCS

## 2018-10-11 PROCEDURE — G8987 SELF CARE CURRENT STATUS: HCPCS

## 2018-10-11 PROCEDURE — 99238 HOSP IP/OBS DSCHRG MGMT 30/<: CPT | Performed by: PHYSICIAN ASSISTANT

## 2018-10-11 PROCEDURE — G8989 SELF CARE D/C STATUS: HCPCS

## 2018-10-11 PROCEDURE — 97530 THERAPEUTIC ACTIVITIES: CPT

## 2018-10-11 PROCEDURE — 93306 TTE W/DOPPLER COMPLETE: CPT | Performed by: INTERNAL MEDICINE

## 2018-10-11 RX ORDER — FUROSEMIDE 20 MG/1
20 TABLET ORAL DAILY
Qty: 30 TABLET | Refills: 1 | Status: SHIPPED | OUTPATIENT
Start: 2018-10-11 | End: 2018-11-29 | Stop reason: ALTCHOICE

## 2018-10-11 RX ORDER — PANTOPRAZOLE SODIUM 40 MG/1
40 TABLET, DELAYED RELEASE ORAL DAILY
Qty: 30 TABLET | Refills: 2 | Status: SHIPPED | OUTPATIENT
Start: 2018-10-12 | End: 2018-11-02

## 2018-10-11 RX ORDER — SIMVASTATIN 40 MG
40 TABLET ORAL
Qty: 30 TABLET | Refills: 1 | Status: SHIPPED | OUTPATIENT
Start: 2018-10-11 | End: 2019-04-01 | Stop reason: SDUPTHER

## 2018-10-11 RX ORDER — ACETAMINOPHEN 325 MG/1
650 TABLET ORAL EVERY 6 HOURS PRN
Qty: 30 TABLET | Refills: 0 | Status: SHIPPED | OUTPATIENT
Start: 2018-10-11 | End: 2018-12-20

## 2018-10-11 RX ORDER — POTASSIUM CHLORIDE 20 MEQ/1
20 TABLET, EXTENDED RELEASE ORAL DAILY
Qty: 30 TABLET | Refills: 1 | Status: SHIPPED | OUTPATIENT
Start: 2018-10-12 | End: 2018-12-20

## 2018-10-11 RX ORDER — ASPIRIN 81 MG/1
81 TABLET ORAL DAILY
Qty: 100 TABLET | Refills: 0 | Status: SHIPPED | OUTPATIENT
Start: 2018-10-11

## 2018-10-11 RX ORDER — CLOPIDOGREL BISULFATE 75 MG/1
75 TABLET ORAL DAILY
Qty: 30 TABLET | Refills: 2 | Status: SHIPPED | OUTPATIENT
Start: 2018-10-12 | End: 2019-01-14

## 2018-10-11 RX ORDER — METOPROLOL SUCCINATE 25 MG/1
25 TABLET, EXTENDED RELEASE ORAL DAILY
Qty: 30 TABLET | Refills: 2 | Status: SHIPPED | OUTPATIENT
Start: 2018-10-11 | End: 2019-03-18 | Stop reason: SDUPTHER

## 2018-10-11 RX ADMIN — SERTRALINE HYDROCHLORIDE 50 MG: 50 TABLET ORAL at 08:29

## 2018-10-11 RX ADMIN — LEVOTHYROXINE SODIUM 50 MCG: 50 TABLET ORAL at 05:29

## 2018-10-11 RX ADMIN — MUPIROCIN 1 APPLICATION: 20 OINTMENT TOPICAL at 08:31

## 2018-10-11 RX ADMIN — POTASSIUM CHLORIDE 20 MEQ: 1500 TABLET, EXTENDED RELEASE ORAL at 08:29

## 2018-10-11 RX ADMIN — FONDAPARINUX SODIUM 2.5 MG: 2.5 INJECTION, SOLUTION SUBCUTANEOUS at 08:31

## 2018-10-11 RX ADMIN — LORATADINE 10 MG: 10 TABLET ORAL at 08:34

## 2018-10-11 RX ADMIN — ASPIRIN 81 MG: 81 TABLET, COATED ORAL at 08:29

## 2018-10-11 RX ADMIN — CLOPIDOGREL BISULFATE 75 MG: 75 TABLET ORAL at 08:29

## 2018-10-11 RX ADMIN — PANTOPRAZOLE SODIUM 40 MG: 40 TABLET, DELAYED RELEASE ORAL at 06:13

## 2018-10-11 RX ADMIN — SODIUM CHLORIDE 75 ML/HR: 0.9 INJECTION, SOLUTION INTRAVENOUS at 04:47

## 2018-10-11 RX ADMIN — DOCUSATE SODIUM 100 MG: 100 CAPSULE, LIQUID FILLED ORAL at 08:29

## 2018-10-11 RX ADMIN — METOPROLOL SUCCINATE 25 MG: 25 TABLET, EXTENDED RELEASE ORAL at 08:29

## 2018-10-11 RX ADMIN — FUROSEMIDE 40 MG: 10 INJECTION, SOLUTION INTRAMUSCULAR; INTRAVENOUS at 08:32

## 2018-10-11 NOTE — PLAN OF CARE
Problem: OCCUPATIONAL THERAPY ADULT  Goal: Performs self-care activities at highest level of function for planned discharge setting  See evaluation for individualized goals  See flowsheet documentation for full assessment, interventions and recommendations  Outcome: Completed Date Met: 10/11/18  Limitation: Decreased ADL status, Decreased high-level ADLs, Decreased self-care trans, Decreased endurance  Prognosis: Good  Assessment: Pt was seen this date for OT tx session focusing on LB dressing tasks, transfers, toileting and voerall actiity tolerance  Pt presents seated in chair and requesting to utilize BR, SBA for STS and thrgouhout ambulating trasnfer to standard toilet, Pt request assist for anal hygiene reporting " I can do it but with all these lines I dont want to make a mess" Comeplte perihygiene independent  Static standing at sink for hand hygiene  Return to chair, seated in chair pt given LHAE for LB dressing tasks demonstrates good recall of use as pt reports having LHAE at home but hasnt used in a while  Doffs/don socks mod I utilzing same  Pt requests BR again, SBA throguhout entire process, good carter techniqanat noted, no LOB, RW level throguhout  Pt is RW and O2 use at baseline  Pt has met all previously establish goals, spoke with OTR/L d/c OT at this time as there are no immediate OT needs        OT Discharge Recommendation: Home OT  OT - OK to Discharge:  (yes, when medically able)  MATEO Gamble

## 2018-10-11 NOTE — OCCUPATIONAL THERAPY NOTE
Occupational Therapy Treatment Note:     10/11/18 0920   Restrictions/Precautions   Weight Bearing Precautions Per Order No   Other Precautions Cardiac/sternal;O2   ADL   Where Assessed Chair   LB Dressing Assistance 6  Modified independent   LB Dressing Deficit Increased time to complete; Don/doff L sock; Don/doff R sock   LB Dressing Comments Utilizing LHAE pt compeltes LB dressing tasks mod I   Toileting Assistance  5  Supervision/Setup   Toileting Deficit Other (Comment)  (line managment)   Toileting Comments Pt compeltes toileting SBA, Requests assist for anal hygiene reporting " With all these wire i dont want to make a mess" complete perihygiene independent   Functional Standing Tolerance   Time ~5 mins   Activity standing at sink   Comments RW level, SBA   Transfers   Sit to Stand 5  Supervision   Additional items Increased time required   Stand to Sit 5  Supervision   Additional items Increased time required   Toilet transfer 5  Supervision   Additional items Other  (RW level)   Toilet Transfers   Toilet Transfer From Rolling walker   Toilet Transfer Type To and from   Toilet Transfer to Standard toilet   Toilet Transfer Technique Ambulating   Toilet Transfers Supervision   Toilet Transfers Comments Demsontrates safe toilet transfer technique   Cognition   Overall Cognitive Status WFL   Arousal/Participation Alert   Attention Within functional limits   Orientation Level Oriented X4   Memory Within functional limits   Following Commands Follows all commands and directions without difficulty   Comments Pt reports she is ready to go home, deonstrates good recall of LHAE use   Activity Tolerance   Activity Tolerance Patient tolerated treatment well   Medical Staff Made Aware NSG aware   Assessment   Assessment Pt was seen this date for OT tx session focusing on LB dressing tasks, transfers, toileting and voerall actiity tolerance   Pt presents seated in chair and requesting to utilize BR, SBA for STS and thrgouhout ambulating trasnfer to standard toilet, Pt request assist for anal hygiene reporting " I can do it but with all these lines I dont want to make a mess" Comeplte perihygiene independent  Static standing at sink for hand hygiene  Return to chair, seated in chair pt given LHAE for LB dressing tasks demonstrates good recall of use as pt reports having LHAE at home but hasnt used in a while  Doffs/don socks mod I utilzing same  Pt requests BR again, SBA throguhout entire process, good carter techniqes noted, no LOB, RW level throguhout  Pt is RW and O2 use at baseline  Pt has met all previously establish goals, spoke with OTR/L d/c OT at this time as there are no immediate OT needs      Plan   Treatment Interventions ADL retraining   Goal Expiration Date 10/20/18   Treatment Day 1   OT Frequency 3-5x/wk   Recommendation   OT Discharge Recommendation Home OT       MATEO Covington

## 2018-10-11 NOTE — DISCHARGE SUMMARY
Discharge Summary - Cardiothoracic Surgery   Topher Capellan 65 y o  female MRN: [id number]  [facility] Encounter: [id number]    Admission Date: 10/9/2018     Discharge Date: 10/11/18    Admitting Diagnosis: Severe aortic stenosis [I35 0]    Primary Discharge Diagnosis:   Aortic stenosis, Non-Rheumatic  S/P transfemoral transcatheter aortic valve replacement;    Secondary Discharge Diagnosis:   severe AS, CAD, CHF, HTN, NSR 1st AVB, HL, GERD, hypothyroidism, h/o CAP, mild COPD, JANICE on CPAP, anemia, arthritis, morbid obesity, simple goiter, frequent UTI, heme (+) stool (colonoscopy postop), right shoulder dislocation    Attending: SOLO Wang  Consulting Physician(s):   Cardiology  Medical/Critical Care      Procedures Performed:   Orders Placed This Encounter   Procedures    Cardiac tavr [facility] Course:   10/9: TF TAVR (#23mm Haresh 3)  Patient tolerated the procedure well & was transferred to the ICU hemodynamically stable on no gtts  No PVL on intra-op GARRISON s/p valve deployment  2+ palpable DP/PT pulses  NSR  Wean to extubate  10/10: Extubated this AM to BiPAP  NSR, HD stable  I/O +400cc/24h  Plan to keep ICU  Start low dose Lopressor  BID Lasix  D/C tomer/nathan  Wean BiPAP as tolerated  10/11: Required BiPap last night  Weaned to 2L NC this morning, which is her home baseline  Ready for discharge to home  On Aspirin/Plavix for antiplatelet therapy  Toprol XL 25 mg for blood pressure control  Resume home diuretic of Lasix 20 mg PO QD  Condition at Discharge:   good     Discharge Physical Exam:  HEENT/NECK:  PERRLA  No jugular venous distention  Cardiac: Regular rate and rhythm  No rubs/murmurs/gallops  Pulmonary:  Breath sounds slightly diminished at the bases bilaterally  Abdomen:  Non-tender, Non-distended  Positive bowel sounds  Incisions: Inguinal incision dressed with Mepilex AG  No erythema or drainage  Lower extremities: Extremities warm/dry  Radial/PT/DP pulses 2+ bilaterally  Trace edema B/L  Neuro: Alert and oriented X 3  Sensation is grossly intact  No focal deficits  Skin: Warm/Dry, without rashes or lesions  Discharge Data:    Results from last 7 days  Lab Units 10/11/18  0441 10/10/18  0404 10/10/18  0017  10/09/18  1558   WBC Thousand/uL 9 13 11 78*  --   --   --    HEMOGLOBIN g/dL 8 3* 9 2* 9 0*  --  8 5*   I STAT HEMOGLOBIN   --   --   --   < >  --    HEMATOCRIT % 28 8* 30 8* 30 6*  < > 29 3*   PLATELETS Thousands/uL 313 352  --   --  331   < > = values in this interval not displayed  Results from last 7 days  Lab Units 10/11/18  0441 10/10/18  0404 10/10/18  0017  10/09/18  1624 10/09/18  1558   SODIUM mmol/L 139 140  --   --   --  140   POTASSIUM mmol/L 3 8 3 9 3 6  < >  --  4 1   CHLORIDE mmol/L 106 107  --   --   --  108   CO2 mmol/L 27 25  --   --   --  29   BUN mg/dL 15 15  --   --   --  21   CREATININE mg/dL 0 61 0 67  --   --   --  0 68   GLUCOSE, ISTAT mg/dl  --   --   --   --  101  --    CALCIUM mg/dL 8 3 8 3  --   --   --  8 1*   < > = values in this interval not displayed  Discharge instructions/Information to patient and family:   See after visit summary for information provided to patient and family  Tom Silva was educated on restrictions regarding driving and lifting, and techniques of proper incisional care  They were specifically counselled on signs and symptoms of a sternal wound infection, and advised to contact our service immediately should they develop fevers, sweats, chill, redness or drainage at the site of any incisions  Provisions for Follow-Up Care:  See after visit summary for information related to follow-up care and any pertinent home health orders  Disposition:  Home    Planned Readmission:   No    Discharge Medications:  See after visit summary for reconciled discharge medications provided to patient and family        Tom Silva was provided contact information and scheduled a follow up appointment with SOLO Pandey  Additionally, they were advised to schedule follow up appointments to be seen by their primary care physician within 7-10 days, and their cardiologist within 2-3 weeks  Contact information was provided  Yoli Nuñez was counseled on the importance of avoiding tobacco products  As with all patients whom have undergone open heart surgery, tobacco cessation medication was contraindicated at the time of discharge  ACE/ARB was Contraindicated secondary to EF > 40%      The patient was discharged on ongoing diuretic therapy with Furosemide 20 mg, PO QD and Potassium Chloride 20 mEq, PO QD  They were advised to continue these medications, unless otherwise directed  The patient was informed that following their postoperative surgical evaluation, they will be referred to outpatient cardiac rehabilitation  They were counseled that this program is run by specialists who will help them safely strengthen their heart and prevent more heart disease  Cardiac rehabilitation will include exercise, relaxation, stress management, and heart-healthy nutrition  Caregivers will also check to make sure their medication regimen is working  I spent 30 minutes discharging the patient  This time was spent on the day of discharge  I had direct contact with the patient on the day of discharge  Additional documentation is required if more than 30 minutes were spent on discharge       SIGNATURE: Rin Vidal PA-C  DATE: October 11, 2018  TIME: 12:01 PM

## 2018-10-11 NOTE — SOCIAL WORK
Pt is cleared for d/c by Cardiothoracic Surgery  Pt is accepted for resumption of services by [facility] for her aftercare plan  The pt and her friend Bull Gordon were both informed of d/c  Friend will transport pt home later this day, pickup time TBD  No IMM required due to LOS < 2 days  No chart copy required  CM to follow

## 2018-10-11 NOTE — PROGRESS NOTES
Progress Note - Cardiothoracic Surgery   Shaan Raphael 65 y o  female MRN: [id number]  Unit/Bed#: [facility] 420-01 Encounter: [id number]  Aortic stenosis, Non-Rheumatic  S/P transfemoral transcatheter aortic valve replacement; POD # 2    24 Hour Events: Required BiPap while sleeping last night  Onl 2L nasal cannula this morning, which is her baseline        Medications:   Scheduled Meds:  Current Facility-Administered Medications:  acetaminophen 650 mg Oral Q4H PRN Travis Bolds, PA-C    aspirin 81 mg Oral Daily Castleton Bolds, PA-C    benzonatate 200 mg Oral TID PRN Castleton Bolds, PA-C    bisacodyl 10 mg Rectal Daily PRN Castleton Bolds, PA-C    clopidogrel 75 mg Oral Daily Travis Bolds, PA-C    docusate sodium 100 mg Oral BID Travis Bolds, PA-C    fondaparinux 2 5 mg Subcutaneous Daily Castleton Bolds, PA-C    furosemide 40 mg Intravenous Daily Arleth Light PA-C    insulin lispro 1-5 Units Subcutaneous TID JAKE Villavicencio-NITO    insulin lispro 1-5 Units Subcutaneous HS Castleton Bolds, PA-C    levothyroxine 50 mcg Oral Daily Castleton Bolds, PA-C    loratadine 10 mg Oral Daily Castleton Bolds, PA-C    metoprolol succinate 25 mg Oral Daily Castleton Bolds, PA-C    mupirocin 1 application Nasal [address] Rosamaria Ganadina Lo, PA-C    ondansetron 4 mg Intravenous Q6H PRN Travis Bolds, PA-C    pantoprazole 40 mg Oral Daily Travis Bolds, PA-C    polyethylene glycol 17 g Oral Daily Castleton Bolds, PA-C    potassium chloride 20 mEq Oral Daily Arleth Light, PA-C    pravastatin 80 mg Oral HS Travis Bolds, PA-C    sertraline 50 mg Oral Daily Travis Bolds, PA-C    sodium chloride 75 mL/hr Intravenous Continuous Jose Vanessa CRNA Last Rate: 75 mL/hr (10/11/18 0447)     Continuous Infusions:  sodium chloride 75 mL/hr Last Rate: 75 mL/hr (10/11/18 0447)     PRN Meds:   acetaminophen    benzonatate    bisacodyl    ondansetron    Vitals:   Vitals:    10/10/18 2323 10/11/18 0254 10/11/18 0542 10/11/18 0714   BP: 121/58 133/63  123/59   BP Location: Left arm Right arm  Right arm   Pulse: (!) 54 (!) 54  56   Resp: 18 19  20   Temp: 99 1 °F (37 3 °C) (!) 97 4 °F (36 3 °C)  97 7 °F (36 5 °C)   TempSrc: Axillary Axillary  Oral   SpO2: 99% 100%  97%   Weight:   98 kg (216 lb 0 8 oz)    Height:           Telemetry: NSR; Heart Rate: 65    Respiratory:   SpO2: SpO2: 97 %; 2 LPM    Intake/Output:   I/O       10/09 0701 - 10/10 0700 10/10 0701 - 10/11 0700 10/11 0701 - 10/12 0700    P  O   410     I V  (mL/kg) 2095 6 (21 3) 1030 6 (10 5)     IV Piggyback 250 150     Total Intake(mL/kg) 2345 6 (23 8) 1590 6 (16 2)     Urine (mL/kg/hr) 1940 1380 (0 6) 100 (0 7)    Total Output 1940 1380 100    Net +405 6 +210 6 -100           Unmeasured Stool Occurrence  1 x 1 x          Weights:   Weight (last 2 days)     Date/Time   Weight    10/11/18 0542  98 (216 05)    10/10/18 0600  98 5 (217 15)    10/09/18 1048  98 (216)            Admit weight: 98    Results:     Results from last 7 days  Lab Units 10/11/18  0441 10/10/18  0404 10/10/18  0017  10/09/18  1558   WBC Thousand/uL 9 13 11 78*  --   --   --    HEMOGLOBIN g/dL 8 3* 9 2* 9 0*  --  8 5*   I STAT HEMOGLOBIN   --   --   --   < >  --    HEMATOCRIT % 28 8* 30 8* 30 6*  < > 29 3*   PLATELETS Thousands/uL 313 352  --   --  331   < > = values in this interval not displayed  Results from last 7 days  Lab Units 10/11/18  0441 10/10/18  0404 10/10/18  0017  10/09/18  1624 10/09/18  1558   SODIUM mmol/L 139 140  --   --   --  140   POTASSIUM mmol/L 3 8 3 9 3 6  < >  --  4 1   CHLORIDE mmol/L 106 107  --   --   --  108   CO2 mmol/L 27 25  --   --   --  29   BUN mg/dL 15 15  --   --   --  21   CREATININE mg/dL 0 61 0 67  --   --   --  0 68   GLUCOSE, ISTAT mg/dl  --   --   --   --  101  --    CALCIUM mg/dL 8 3 8 3  --   --   --  8 1*   < > = values in this interval not displayed        Point of care glucose:   No insulin required    Studies:  Echocardiogram: Complete - results pending    Invasive Lines/Tubes:  Invasive Devices     Central Venous Catheter Line            CVC Central Lines 10/09/18 Triple 1 day          Peripheral Intravenous Line            Peripheral IV 10/09/18 Left;Dorsal (posterior) Hand 1 day    Peripheral IV 10/09/18 Left;Ventral (anterior) Antecubital 1 day                Physical Exam:    HEENT/NECK:  PERRLA  No jugular venous distention  Cardiac: Regular rate and rhythm  No rubs/murmurs/gallops  Pulmonary:  Breath sounds slightly diminished at the bases bilaterally  Abdomen:  Non-tender, Non-distended  Positive bowel sounds  Incisions: Inguinal incision dressed with Mepilex AG  No erythema or drainage  Lower extremities: Extremities warm/dry  Radial/PT/DP pulses 2+ bilaterally  Trace edema B/L  Neuro: Alert and oriented X 3  Sensation is grossly intact  No focal deficits  Skin: Warm/Dry, without rashes or lesions  Assessment:  Patient Active Problem List   Diagnosis    Hypothyroid    Hypertension    Hyperlipidemia    Reactive airway disease without complication    JANICE (obstructive sleep apnea)    LVH (left ventricular hypertrophy)    Mitral annular calcification    Mitral valve stenosis    Pulmonary hypertension (HCC)    Chronic diastolic (congestive) heart failure (HCC)    Anemia    Obesity, morbid (HCC)    Gastroesophageal reflux disease without esophagitis    Arthritis    AVB (atrioventricular block)    Chronic diastolic congestive heart failure (HCC)    COPD, mild (HCC)    Coronary artery disease    JANICE on CPAP    S/P TAVR (transcatheter aortic valve replacement)    Acute pulmonary insufficiency       Aortic stenosis, Non-Rheumatic  S/P transfemoral transcatheter aortic valve replacement; POD # 2    Plan:    1   Cardiac:   NSR; HR/BP well-controlled  Toprol XL, 25 mg PO QD  Continue ASA and Statin therapy  Central IV access no longer required; Remove central venous catheter today  ASA/Plavix  Follow up transthoracic echocardiogram today  Continue DVT prophylaxis    2  Pulmonary:   Acute post-op pulmonary insufficiency; Requiring 2 Liters via nasal cannula, secondary to pulmonary vascular congestion  Continue incentive spirometry/coughing/deep breathing exercises  Wean supplemental oxygen as tolerated for saturation > 90%    3  Renal:   Intake/Output net: +210 mL/24 hours    Lasix 40 mg IV QD    Potassium chloride tablet 20 mg PO QD  Post op Creatinine stable; Follow up labs prn    4  Neuro:  Neurologically intact; No active issues  Incisional pain well-controlled; Continue prn Tylenol    5  GI:  Tolerating TLC 2 3 gm sodium diet  Maintain 1800 mL daily fluid restriction   Continue stool softeners and prn suppository  Continue GI prophylaxis    6  Endo:    No history of diabetes; Glucose well-controlled with insulin sliding scale coverage    7   Disposition:  Anticipate discharge to home today     VTE Pharmacologic Prophylaxis: Fondaparinux (Arixtra)  VTE Mechanical Prophylaxis: sequential compression device    Collaborative rounds completed with CHAVEZ Hodges , and Citlaly Aguilar RN    SIGNATURE: Ja Weber PA-C  DATE: October 11, 2018  TIME: 8:34 AM

## 2018-10-15 ENCOUNTER — TELEPHONE (OUTPATIENT)
Dept: CARDIAC SURGERY | Facility: CLINIC | Age: 78
End: 2018-10-15

## 2018-10-15 NOTE — TELEPHONE ENCOUNTER
POST OP CALL    10/9/18: TAVR  10/11/18: discharge home    10/15/18: Home PT called today to report HTN after walking 4 min  BP up to 180/90  Patient tolerated activity without signiofcant SOB, angina or lightheadedness  Activity stopped due to fatigue  SBP recovered and dropped to 150 after 12 min of rest   Groin sites healing well  No edema  Advised to contact Cardiology regarding BP  Patient is on all pre op medications

## 2018-10-16 ENCOUNTER — TELEPHONE (OUTPATIENT)
Dept: FAMILY MEDICINE CLINIC | Facility: CLINIC | Age: 78
End: 2018-10-16

## 2018-10-16 NOTE — TELEPHONE ENCOUNTER
Pt wants to know if its okay for her to start taking her iron pills again  Avni Davis said since she had her surgery will it be a problem

## 2018-10-18 ENCOUNTER — OFFICE VISIT (OUTPATIENT)
Dept: FAMILY MEDICINE CLINIC | Facility: CLINIC | Age: 78
End: 2018-10-18
Payer: MEDICARE

## 2018-10-18 VITALS
BODY MASS INDEX: 50.22 KG/M2 | HEIGHT: 55 IN | DIASTOLIC BLOOD PRESSURE: 72 MMHG | OXYGEN SATURATION: 93 % | SYSTOLIC BLOOD PRESSURE: 114 MMHG | WEIGHT: 217 LBS | HEART RATE: 64 BPM

## 2018-10-18 DIAGNOSIS — D64.9 ANEMIA, UNSPECIFIED TYPE: ICD-10-CM

## 2018-10-18 DIAGNOSIS — Z51.89 AFTERCARE: Primary | ICD-10-CM

## 2018-10-18 DIAGNOSIS — H92.02 CHRONIC EAR PAIN, LEFT: ICD-10-CM

## 2018-10-18 DIAGNOSIS — G89.29 CHRONIC EAR PAIN, LEFT: ICD-10-CM

## 2018-10-18 PROCEDURE — 99496 TRANSJ CARE MGMT HIGH F2F 7D: CPT | Performed by: FAMILY MEDICINE

## 2018-10-18 NOTE — PROGRESS NOTES
Assessment/Plan:     Chronic Problems:  No problem-specific Assessment & Plan notes found for this encounter  Visit Diagnosis:  Diagnoses and all orders for this visit:    Aftercare s/p tavr  Comments:  keep the f/u with Dr Tali Chandler  Orders:  -     Comprehensive metabolic panel; Future    Anemia, unspecified type  Comments: Will refer back to Dr Dina Vazquez  Orders:  -     Ambulatory referral to Hematology / Oncology; Future  -     CBC and differential; Future  -     Iron, TIBC and Ferritin Panel; Future  -     Vitamin B12; Future  -     Folate; Future    Chronic ear pain, left with poor healing perforation  Comments:  Make your appt with Dr Dina Vazquez  Orders:  -     Ambulatory Referral to Otolaryngology; Future        Subjective:     Patient ID: Dean Yanes is a 65 y o  female  Pt is here for f/u s/p tavr  Not back to herself yet  Very tired and yesterday was not feeling right in the head, ? Lightheaded  Thinks it may be stress when she was looking through her sisters paperwork  Visiting nurses still coming in on Friday  Pt has no complaints today other than her head  Was on abx by Dr Dina Vazquez for an ear infection but never made it back  Feels her breathing is better but still on 2 liters n/c  Takes all other meds as directed  No side effects noted           Active Problems    Patient Active Problem List   Diagnosis    Hypothyroid    Hypertension    Hyperlipidemia    Reactive airway disease without complication    JANICE (obstructive sleep apnea)    LVH (left ventricular hypertrophy)    Mitral annular calcification    Mitral valve stenosis    Pulmonary hypertension (HCC)    Chronic diastolic (congestive) heart failure (HCC)    Anemia    Obesity, morbid (HCC)    Gastroesophageal reflux disease without esophagitis    Arthritis    AVB (atrioventricular block)    Chronic diastolic congestive heart failure (HCC)    COPD, mild (HCC)    Coronary artery disease    JANICE on CPAP    S/P TAVR (transcatheter aortic valve replacement)    Acute pulmonary insufficiency    Postoperative anemia due to acute blood loss       Past Medical History     Past Medical History:   Diagnosis Date    Anemia 08/22/2018    Arthritis     AVB (atrioventricular block)     first degree    CHF (congestive heart failure) (HCC)     COPD, mild (HCC)     Coronary artery disease     Dislocation of right shoulder joint     Frequent UTI     GERD (gastroesophageal reflux disease)     H/O: pneumonia     Heme positive stool     History of uterine cancer     s/p CALLIE    Hyperlipidemia     Hypertension     Hypothyroidism     Obesity, morbid ([facility] [address] ) 08/22/2018    JANICE on CPAP     Pulmonary hypertension ([address] ) 08/22/2018    Severe aortic stenosis     Simple goiter     Skin cyst     within the armpits, right       Surgical History    Past Surgical History:   Procedure Laterality Date    BREAST BIOPSY      CARDIAC CATHETERIZATION      CARPAL TUNNEL RELEASE Bilateral     CHOLECYSTECTOMY      DILATION AND CURETTAGE OF UTERUS      HYSTEROSCOPY      UT COLONOSCOPY FLX DX W/COLLJ SPEC WHEN PFRMD N/A 9/6/2018    Procedure: COLONOSCOPY;  Surgeon: Myra Sorto MD;  [facility]: [facility]; Service: Gastroenterology    UT ECHO TRANSESOPHAG R-T 2D W/PRB IMG ACQUISJ I&R N/A 10/9/2018    Procedure: INTRA-OP TRANSESOPHAGEAL ECHOCARDIOGRAM (GARRISON); Surgeon: Wang Holguin DO;  [facility];  Service: Cardiac Surgery    UT ESOPHAGOGASTRODUODENOSCOPY TRANSORAL DIAGNOSTIC N/A 8/31/2018    Procedure: ESOPHAGOGASTRODUODENOSCOPY (EGD); Surgeon: Myra Sorto MD;  [facility]; Service: Gastroenterology    UT REPLACE AORTIC VALVE OPENFEMORAL ARTERY APPROACH N/A 10/9/2018    Procedure: REPLACEMENT AORTIC VALVE TRANSCATHETER (TAVR) TRANSFEMORAL W/ 23 MM MENDOZA NOE S3 VALVE (ACCESS OF LEFT);   Surgeon: Wang Holguin DO;  [facility];  Service: Cardiac Surgery    TOTAL ABDOMINAL HYSTERECTOMY      TOTAL HIP ARTHROPLASTY Left 2007    TOTAL KNEE ARTHROPLASTY Bilateral        Current Meds       Current Outpatient Prescriptions:     acetaminophen (TYLENOL) 325 mg tablet, Take 2 tablets (650 mg total) by mouth every 6 (six) hours as needed for mild pain or headaches, Disp: 30 tablet, Rfl: 0    aspirin (ECOTRIN LOW STRENGTH) 81 mg EC tablet, Take 1 tablet (81 mg total) by mouth daily, Disp: 100 tablet, Rfl: 0    b complex vitamins capsule, Take 1 capsule by mouth 2 (two) times a day  , Disp: , Rfl:     Calcium Carb-Cholecalciferol (CALCIUM 600 + D PO), Take 1 tablet by mouth 2 (two) times a day, Disp: , Rfl:     clopidogrel (PLAVIX) 75 mg tablet, Take 1 tablet (75 mg total) by mouth daily, Disp: 30 tablet, Rfl: 2    Fesoterodine Fumarate ER (TOVIAZ) 8 MG TB24, Take 8 mg by mouth daily, Disp: , Rfl:     furosemide (LASIX) 20 mg tablet, Take 1 tablet (20 mg total) by mouth daily, Disp: 30 tablet, Rfl: 1    levothyroxine 50 mcg tablet, Take 50 mcg by mouth daily, Disp: , Rfl:     loratadine (CLARITIN) 10 mg tablet, Take 10 mg by mouth daily, Disp: , Rfl:     metoprolol succinate (TOPROL-XL) 25 mg 24 hr tablet, Take 1 tablet (25 mg total) by mouth daily, Disp: 30 tablet, Rfl: 2    pantoprazole (PROTONIX) 40 mg tablet, Take 1 tablet (40 mg total) by mouth daily, Disp: 30 tablet, Rfl: 2    potassium chloride (K-DUR,KLOR-CON) 20 mEq tablet, Take 1 tablet (20 mEq total) by mouth daily, Disp: 30 tablet, Rfl: 1    sertraline (ZOLOFT) 50 mg tablet, Take 50 mg by mouth daily, Disp: , Rfl:     simvastatin (ZOCOR) 40 mg tablet, Take 1 tablet (40 mg total) by mouth daily at bedtime, Disp: 30 tablet, Rfl: 1    temazepam (RESTORIL) 15 mg capsule, Take 15 mg by mouth daily, Disp: , Rfl: 1    Allergies    Allergies   Allergen Reactions    Latex Rash    Neosporin [Neomycin-Bacitracin Zn-Polymyx] Rash and Other (See Comments)     hives per [facility] order       No images are attached to the encounter  Health Management    Health Maintenance   Topic Date Due    INFLUENZA VACCINE  07/01/2018    Pneumococcal PPSV23/PCV13 65+ Years / Low and Medium Risk (2 of 2 - PPSV23) 06/19/2019    Fall Risk  07/03/2019    Depression Screening PHQ  07/03/2019    Urinary Incontinence Screening  07/03/2019    DTaP,Tdap,and Td Vaccines (2 - Td) 07/03/2028       CBC:   Results from last 6 Months  Lab Units 10/11/18  0441  08/15/18  0442   WBC Thousand/uL 9 13  < > 8 75   RBC Million/uL 3 48*  < > 4 21   HEMOGLOBIN g/dL 8 3*  < > 9 8*   I STAT HEMOGLOBIN   --   < >  --    HEMATOCRIT % 28 8*  < > 33 2*   MCV fL 83  < > 79*   MCH pg 23 9*  < > 23 3*   MCHC g/dL 28 8*  < > 29 5*   RDW % 19 2*  < > 19 0*   MPV fL 9 6  < > 9 0   PLATELETS Thousands/uL 313  < > 429*   NRBC AUTO /100 WBCs  --   --  0   NEUTROS PCT %  --   --  56   LYMPHS PCT %  --   --  24   MONOS PCT %  --   --  11   EOS PCT %  --   --  7*   BASOS PCT %  --   --  1   NEUTROS ABS Thousands/µL  --   --  4 88   LYMPHS ABS Thousands/µL  --   --  2 13   MONOS ABS Thousand/µL  --   --  0 99   EOS ABS Thousand/µL  --   --  0 64*   < > = values in this interval not displayed    Chemistry Profile:   Results from last 6 Months  Lab Units 10/11/18  0441  10/09/18  1624  08/29/18  1000  08/13/18  1319 06/05/18  0736   SODIUM mmol/L 139  < >  --   < > 143  < > 141 141   POTASSIUM mmol/L 3 8  < >  --   < > 3 6  < > 4 5 4 2   CHLORIDE mmol/L 106  < >  --   < > 101  < > 104 102   CO2 mmol/L 27  < >  --   < > 34*  < > 29 31   BUN mg/dL 15  < >  --   < > 24  < > 22 19   CREATININE mg/dL 0 61  < >  --   < > 0 90  < > 0 75 0 74   GLUCOSE FASTING mg/dL  --   --   --   --  98  --   --  92   GLUCOSE, ISTAT mg/dl  --   --  101  < >  --   --   --   --    CALCIUM mg/dL 8 3  < >  --   < > 10 4*  < > 9 8 9 9   MAGNESIUM mg/dL  --   --   --   --   --   --   --  2 0   AST U/L  --   --   --   --   --   --  18 20   ALT U/L  --   --   --   --   --   --  18 22   ALK PHOS U/L  --   --   -- --   --   --  86 81   EGFR ml/min/1 73sq m 87  < >  --   < > 61  < > 77 78   < > = values in this interval not displayed  Coagulation Studies:     Results from last 6 Months  Lab Units 09/27/18  1112   PROTIME seconds 13 4   INR  1 03     Endocrine Studies:     Results from last 6 Months  Lab Units 09/27/18  1112 06/05/18  0736   HEMOGLOBIN A1C % 5 7  --    TSH 3RD GENERATON uIU/mL  --  1 575       Imaging: Xr Chest Portable    Result Date: 10/10/2018  Narrative: CHEST INDICATION:   Post Open Heart Surgey  COMPARISON:  Chest x-ray performed the previous day  EXAM PERFORMED/VIEWS:  XR CHEST PORTABLE FINDINGS:  ET tube tip 3 cm above the suzie  Right IJ catheter tip in right atrium  Prosthetic aortic valve  Cardiomediastinal silhouette appears unremarkable  The lungs are clear  No pneumothorax or pleural effusion  Osseous structures appear within normal limits for patient age  Impression: Appropriate lines and tubes  Workstation performed: [id number]     Xr Chest Portable Icu    Result Date: 10/9/2018  Narrative: CHEST INDICATION:   S/P TAVR  COMPARISON:  Chest x-ray dated August 13, 2018  EXAM PERFORMED/VIEWS:  XR CHEST PORTABLE ICU FINDINGS: Interval postoperative changes of aortic valve replacement are noted  There has been interval placement of an endotracheal tube with the tip above the level of the suzie in appropriate position  There is a right-sided IJ catheter with the tip above the level of the right atrium  Lung markings are crowded secondary to low lung volumes  Within limitations of this examination there is no focal airspace opacity to suggest pneumonia  There is mild bibasilar atelectasis  No pneumothorax or pleural effusion  Osseous structures appear within normal limits for patient age  Impression: Endotracheal tube with the tip above the level of the suzie in appropriate position  Poor inspiratory effort with bibasilar atelectasis    However, no focal infiltrate or pleural effusion  Interval placement of a right IJ catheter with tip at the level of the right atrium  Workstation performed: [id number]         Review of Systems   Constitutional: Positive for fatigue  Negative for chills, diaphoresis and fever  HENT: Positive for ear pain (on the left  Pt with a h/o perforation and never finished f/u with Dr Erickson Banda  )  Negative for sinus pain and sinus pressure  Eyes: Negative  Respiratory: Positive for cough  Negative for shortness of breath and wheezing  Cardiovascular: Negative for chest pain and palpitations  Gastrointestinal: Negative  Genitourinary: Negative  Musculoskeletal: Negative for arthralgias and myalgias  Feels her legs are weak  Neurological: Positive for light-headedness (the other day and not sure if it is from her ear or her anxiety)  Negative for dizziness and headaches  Objective:     Physical Exam   Constitutional: She is oriented to person, place, and time  She appears well-developed and well-nourished  No distress  HENT:   Head: Normocephalic and atraumatic  Right Ear: External ear normal    Mouth/Throat: Oropharynx is clear and moist    Cerumen easily removed from left ear canal  Still with bright red tm and healing perforation  Eyes: Pupils are equal, round, and reactive to light  Conjunctivae and EOM are normal  Right eye exhibits no discharge  Left eye exhibits no discharge  Neck: Normal range of motion  Neck supple  Cardiovascular: Normal rate, regular rhythm and normal heart sounds  Exam reveals no friction rub  No murmur heard  Pulmonary/Chest: Effort normal and breath sounds normal  No respiratory distress  She has no wheezes  She has no rales  Musculoskeletal: Normal range of motion  She exhibits no edema, tenderness or deformity  Ambulates slowly with a walker  Neurological: She is alert and oriented to person, place, and time  No cranial nerve deficit  Skin: Skin is warm and dry   No rash noted  She is not diaphoretic  Psychiatric: She has a normal mood and affect  Her behavior is normal  Judgment and thought content normal          /72   Pulse 64   Ht 4' 7" (1 397 m)   Wt 98 4 kg (217 lb)   SpO2 93%   BMI 50 44 kg/m²     Vitals:    10/18/18 1449   BP: 114/72   Pulse: 64   SpO2: 93%   Weight: 98 4 kg (217 lb)   Height: 4' 7" (1 397 m)       Transitional Care Management Review:  Nieves Cooper is a 65 y o  female here for TCM follow up       During the TCM phone call patient stated:    Date and time hospital follow up call was made:  8/20/2018  9:14 AM  Patient was hopsital[facility] at:  [facility]  Date of admission:  10/9/18  Date of discharge:  10/11/18  Diagnosis:  Severe aortic stenosis   Disposition:  Home  Were the patients medicaitons reviewed and updated:  Yes  Post hospital issues:  None  Should patient be enrolled in anticoag monitoring?:  No  Scheduled for follow up?:  Yes  Patients specialists:  Cardiologist, Other (comment)  Cardiologist's Name:  10/25/2018  Other specialists Name:  Angela Kiser 11/2/2018  Did you obtain your prescribed medications:  Yes  Do you need help managing your perscriptions or medications:  No  Is transportation to your appointments needed:  Yes  I have advised the patient to call PCP with any new or worsening symptoms (please type in name along with any credentials):  Aye Rao MA  Living Arrangements:  Family members  Support System:  Family, [place]  Are you recieving outpatient services:  No  Are you recieving home care services:  No  Are you using any community resources:  No  Current waiver service:  No  Have you fallen in the last 12 months:  No  Interperter language line required?:  No  Counseling:  Patient                     Wilbur Mello, [address] [place] [address]

## 2018-10-18 NOTE — PATIENT INSTRUCTIONS
Discussed all with patient  Keep your follow-up with Dr Ghada Carr  Make your appointment with ENT, Dr Clyde Lizama as you never finished with him for your perforation and ear pains  Will refer to Dr Dexter Chun for evaluation of the anemia with no source  Have the labs done when you can   I will call with numbers  Pt refuses the flu shot

## 2018-10-19 ENCOUNTER — TELEPHONE (OUTPATIENT)
Dept: CARDIOLOGY CLINIC | Facility: CLINIC | Age: 78
End: 2018-10-19

## 2018-10-19 NOTE — TELEPHONE ENCOUNTER
Cassandra gerber Hathorne at home called and wanted to let Dr Latasha Jones know that patient went walking for 3 minutes and her bp went from 148/76 to 180/80   Cassandra Eubanks would like a call back at [phone]

## 2018-10-19 NOTE — TELEPHONE ENCOUNTER
Please advise  S/w Alexus, she stated that pt does not have any symptoms and wanted to let you know about BP since pt recently had a valve replacement

## 2018-10-22 ENCOUNTER — TELEPHONE (OUTPATIENT)
Dept: FAMILY MEDICINE CLINIC | Facility: CLINIC | Age: 78
End: 2018-10-22

## 2018-10-22 DIAGNOSIS — K92.1 BLACK STOOLS: Primary | ICD-10-CM

## 2018-10-22 NOTE — TELEPHONE ENCOUNTER
Pt called has noticed that she stool is now black  Was wondering if it could be from the iron supplements that she is taking?

## 2018-10-22 NOTE — TELEPHONE ENCOUNTER
Nurse kade Barajas called for the same reason, message given to her that pt should have a stool test done, she will notify pt

## 2018-10-22 NOTE — PROGRESS NOTES
Spoke with patient an she said she is not constipated  She will have the stool culture done on Thursday when she gets out for a f/u with Ian Leija  Per Demarco Manning she is to stay on the Iron

## 2018-10-22 NOTE — TELEPHONE ENCOUNTER
Per Vinny Pain will inform patient if that's ok or do you still want me to call Problem: Pressure Ulcer Risk (Refugio Scale) (Adult,Obstetrics,Pediatric)  Intervention: Promote/Optimize Nutrition   08/08/18 0202   Nutrition Interventions   Oral Nutrition Promotion calorie dense foods provided;rest periods promoted

## 2018-10-25 ENCOUNTER — TRANSCRIBE ORDERS (OUTPATIENT)
Dept: LAB | Facility: CLINIC | Age: 78
End: 2018-10-25

## 2018-10-25 ENCOUNTER — OFFICE VISIT (OUTPATIENT)
Dept: CARDIOLOGY CLINIC | Facility: CLINIC | Age: 78
End: 2018-10-25
Payer: MEDICARE

## 2018-10-25 VITALS
HEIGHT: 55 IN | BODY MASS INDEX: 50.45 KG/M2 | SYSTOLIC BLOOD PRESSURE: 124 MMHG | HEART RATE: 79 BPM | OXYGEN SATURATION: 92 % | DIASTOLIC BLOOD PRESSURE: 64 MMHG | WEIGHT: 218 LBS

## 2018-10-25 DIAGNOSIS — I35.0 SEVERE AORTIC STENOSIS: Primary | ICD-10-CM

## 2018-10-25 DIAGNOSIS — I50.32 CHRONIC DIASTOLIC (CONGESTIVE) HEART FAILURE (HCC): ICD-10-CM

## 2018-10-25 DIAGNOSIS — G47.33 OSA (OBSTRUCTIVE SLEEP APNEA): ICD-10-CM

## 2018-10-25 DIAGNOSIS — I34.2 NON-RHEUMATIC MITRAL VALVE STENOSIS: ICD-10-CM

## 2018-10-25 DIAGNOSIS — E78.2 MIXED HYPERLIPIDEMIA: ICD-10-CM

## 2018-10-25 DIAGNOSIS — I10 ESSENTIAL HYPERTENSION: ICD-10-CM

## 2018-10-25 DIAGNOSIS — E66.01 MORBID OBESITY (HCC): ICD-10-CM

## 2018-10-25 DIAGNOSIS — Z95.2 S/P TAVR (TRANSCATHETER AORTIC VALVE REPLACEMENT): ICD-10-CM

## 2018-10-25 DIAGNOSIS — I51.7 LVH (LEFT VENTRICULAR HYPERTROPHY): ICD-10-CM

## 2018-10-25 DIAGNOSIS — I05.9 MITRAL ANNULAR CALCIFICATION: ICD-10-CM

## 2018-10-25 DIAGNOSIS — I27.20 PULMONARY HYPERTENSION (HCC): ICD-10-CM

## 2018-10-25 PROCEDURE — 99215 OFFICE O/P EST HI 40 MIN: CPT | Performed by: INTERNAL MEDICINE

## 2018-10-26 NOTE — PROGRESS NOTES
CARDIOLOGY OFFICE VISIT  [facility]  [address], [place], [address] [facility], [place], [facility] Tracy Borden  Tel: [phone]      NAME: Al Ortiz  AGE: 65 y o  SEX: female  : 1940   MRN: [id number]      Chief Complaint:  Chief Complaint   Patient presents with   [facility] f/u    Fatigue         History of Present Illness:   Patient comes for follow up s/p recent TAVR on 10/9/2018  States she still feels weak, tired and short of breath but denies CP, palpitations, lightheadedness, syncope, swelling feet, orthopnea, PND, claudication      Severe aortic stenosis S/P TAVR on 10/9/18 -  Should be starting physical therapy soon after her f/u surgeon visit  On ASA, Plavix (for 90 days total)    Chronic diastolic congestive heart failure -  Currently euvolemic  Continue low-dose furosemide, beta-blocker  Not on ACE-inhibitor/ARB currently  Recently started on home oxygen and needs to address it with her pulmonologist if it is needed     Hypertension, LVH, DD -  Has had it for many years  Takes her medications regularly  Denies lightheadedness, headache, medication side effects        Hyperlipidemia -  Has had it for few years  Takes her medications regularly  Denies myalgia  Her PCP closely monitor the blood work     Mitral stenosis -  Stable  Follow-up with serial echocardiograms    PHTN - from JANICE, valvular disease     Morbid obesity -  Unable to lose weight due to inability to exercise    JANICE - now uses CPAP   She does have history of pulmonary hypertension       Past Medical History:  Past Medical History:   Diagnosis Date    Anemia 2018    Arthritis     AVB (atrioventricular block)     first degree    CHF (congestive heart failure) (HCC)     COPD, mild (HCC)     Coronary artery disease     Dislocation of right shoulder joint     Frequent UTI     GERD (gastroesophageal reflux disease)     H/O: pneumonia     Heme positive stool     History of uterine cancer     s/p CALLIE    Hyperlipidemia     Hypertension     Hypothyroidism     Obesity, morbid ([facility] [address] ) 08/22/2018    JANICE on CPAP     Pulmonary hypertension ([facility] [address] ) 08/22/2018    Severe aortic stenosis     Simple goiter     Skin cyst     within the armpits, right         Past Surgical History:  Past Surgical History:   Procedure Laterality Date    BREAST BIOPSY      CARDIAC CATHETERIZATION      CARPAL TUNNEL RELEASE Bilateral     CHOLECYSTECTOMY      DILATION AND CURETTAGE OF UTERUS      HYSTEROSCOPY      MA COLONOSCOPY FLX DX W/COLLJ SPEC WHEN PFRMD N/A 9/6/2018    Procedure: COLONOSCOPY;  Surgeon: Sejal Rothman MD;  Location: [facility]; Service: Gastroenterology    MA ECHO TRANSESOPHAG R-T 2D W/PRB IMG ACQUISJ I&R N/A 10/9/2018    Procedure: INTRA-OP TRANSESOPHAGEAL ECHOCARDIOGRAM (GARRISON); Surgeon: Emelia Win DO;  Location[facility];  Service: Cardiac Surgery    MA ESOPHAGOGASTRODUODENOSCOPY TRANSORAL DIAGNOSTIC N/A 8/31/2018    Procedure: ESOPHAGOGASTRODUODENOSCOPY (EGD); Surgeon: Sejal Rothman MD;  [facility]: [facility]; Service: Gastroenterology    MA REPLACE AORTIC VALVE OPENFEMORAL ARTERY APPROACH N/A 10/9/2018    Procedure: REPLACEMENT AORTIC VALVE TRANSCATHETER (TAVR) TRANSFEMORAL W/ 23 MM MENDOZA NOE S3 VALVE (ACCESS OF LEFT);   Surgeon: Emelia Win DO;  [facility];  Service: Cardiac Surgery    TOTAL ABDOMINAL HYSTERECTOMY      TOTAL HIP ARTHROPLASTY Left 2007    TOTAL KNEE ARTHROPLASTY Bilateral          Family History:  Family History   Problem Relation Age of Onset    Diabetes Mother     Stroke Mother     Cancer Father     Lung cancer Father     Diabetes Sister     Heart disease Sister     Coronary artery disease Family     Diabetes Family     Hypertension Family     Cancer Family     Stroke Family          Social History:  Social History     Social History    Marital status: Single     Spouse name: N/A    Number of children: N/A    Years of education: N/A     Social History Main Topics    Smoking status: Never Smoker    Smokeless tobacco: Never Used    Alcohol use No    Drug use: No    Sexual activity: No     Other Topics Concern    None     Social History Narrative    Denied drinking coffee    Denied exercise habits             Active Problems:  Patient Active Problem List   Diagnosis    Hypothyroid    Hypertension    Hyperlipidemia    Reactive airway disease without complication    JANICE (obstructive sleep apnea)    LVH (left ventricular hypertrophy)    Mitral annular calcification    Mitral valve stenosis    Pulmonary hypertension (HCC)    Chronic diastolic (congestive) heart failure (HCC)    Anemia    Obesity, morbid (HCC)    Gastroesophageal reflux disease without esophagitis    Arthritis    AVB (atrioventricular block)    Chronic diastolic congestive heart failure (HCC)    COPD, mild (HCC)    Coronary artery disease    JANICE on CPAP    S/P TAVR (transcatheter aortic valve replacement)    Acute pulmonary insufficiency    Postoperative anemia due to acute blood loss         The following portions of the patient's history were reviewed and updated as appropriate: past medical history, past surgical history, past family history,  past social history, current medications, allergies and problem list       Review of Systems:  Constitutional: Denies fever, chills  +fatigue  Eyes: Denies eye redness, eye discharge, double vision  ENT: Denies hearing loss, tinnitus, sneezing, nasal discharge, sore throat   Respiratory: Denies cough, expectoration, hemoptysis   +shortness of breath  Cardiovascular: Denies chest pain, palpitations, orthopnea, PND, lower extremity swelling  Gastrointestinal: Denies abdominal pain, nausea, vomiting, hematemesis, diarrhea, bloody stools  Genito-Urinary: Denies dysuria, incontinence  Musculoskeletal: Denies back pain, joint pain, muscle pain  Neurologic: Denies confusion, lightheadedness, syncope, headache, focal weakness, sensory changes, seizures  Endocrine: Denies polyuria, polydipsia, temperature intolerance  Allergy and Immunology: Denies hives, insect bite sensitivity  Hematological and Lymphatic: Denies bleeding problems, swollen glands   Psychological: Denies depression, suicidal ideation, anxiety, panic  Dermatological: Denies pruritus, rash, skin lesion changes      Vitals:  Vitals:    10/25/18 0940   BP: 124/64   Pulse: 79   SpO2: 92%       Body mass index is 50 67 kg/m²  Weight (last 2 days)     Date/Time   Weight    10/25/18 0940  98 9 (218)                Physical Examination:  General:   Morbidly obese  On O2  Using a walker for support  Patient is not in acute distress  Awake, alert, oriented in time, place and person  Responding to commands  Head: Normocephalic  Atraumatic  Eyes: Both pupils normal sized, round and reactive to light  Nonicteric  ENT: Normal external ear canals  Nares normal, no drainage  Lips and oral mucosa normal  Neck: Supple  JVP not raised  Trachea central  No thyromegaly  Lungs: Bilateral bronchovascular breath sounds with no crackles or rhonchi  Chest wall: No tenderness  Cardiovascular: RRR  [place] prosthetic valve sound+  Gastrointestinal: Abdomen soft, nontender  No guarding or rigidity  Liver and spleen not palpable  Bowel sounds present  Neurologic: Patient is awake, alert, oriented in time, place and person  Responding to command  Moving all extremities  Integumentary:  No skin rash  Lymphatic: No cervical lymphadenopathy  Back: Symmetric   No CVA tenderness  Extremities: No clubbing, cyanosis or edema      Laboratory Results:  CBC with diff:   Lab Results   Component Value Date    WBC 9 13 10/11/2018    RBC 3 48 (L) 10/11/2018    HGB 8 3 (L) 10/11/2018    HCT 28 8 (L) 10/11/2018    MCV 83 10/11/2018    MCH 23 9 (L) 10/11/2018    RDW 19 2 (H) 10/11/2018     10/11/2018       CMP:  Lab Results   Component Value Date    CREATININE 0 61 10/11/2018    BUN 15 10/11/2018     10/11/2018    K 3 8 10/11/2018     10/11/2018    CO2 27 10/11/2018    GLUCOSE 101 10/09/2018    ALKPHOS 86 2018    ALT 18 2018    AST 18 2018       Lab Results   Component Value Date    HGBA1C 5 7 2018    MG 2 0 2018       Lab Results   Component Value Date    TROPONINI <0 02 2018    TROPONINI <0 02 2018    TROPONINI <0 02 2018       Lipid Profile:   No results found for: CHOL  Lab Results   Component Value Date    HDL 59 08/15/2018    HDL 65 (H) 2018     Lab Results   Component Value Date    LDLCALC 72 08/15/2018    LDLCALC 51 2018     Lab Results   Component Value Date    TRIG 112 08/15/2018    TRIG 83 2018       Cardiac testing:   Results for orders placed during the hospital encounter of 18   Echo complete with contrast if indicated    Narrative [address] [place] [address]  [phone]    Transthoracic Echocardiogram  2D, M-mode, Doppler, and Color Doppler    Study date:  14-Aug-2018    Patient: Isadora Marley  MR number: [id number]  Account number: [de-identified]  : 1940  Age: 65 years  Gender: Female  Status: Inpatient  Location: Bedside  Height: 57 in  Weight: 224 lb  BP: 139/ 60 mmHg    Indications: Dyspnea, shortness of breath    Diagnoses: R06 00 - Dyspnea, unspecified    Sonographer:  Adonis Ozuna RDCS  Interpreting Physician:  Ortiz Gamez MD  Referring Physician:  Adele Thomas PA-C  Group:  [facility]    SUMMARY    LEFT VENTRICLE:  Systolic function was normal  Ejection fraction was estimated in the range of 55 % to 65 %  There were no regional wall motion abnormalities  There was mild concentric hypertrophy  Doppler parameters were consistent with abnormal left ventricular relaxation (grade 1 diastolic dysfunction)      LEFT ATRIUM:  The atrium was mildly dilated  MITRAL VALVE:  There was mild annular calcification  AORTIC VALVE:  The valve was not visualized well enough to rule out a bicuspid morphology  Leaflets exhibited marked calcification and markedly reduced cuspal separation  Transaortic velocity was increased due to valvular stenosis  There was moderate to severe stenosis  There was mild regurgitation  TRICUSPID VALVE:  There was mild regurgitation  Estimated peak PA pressure was 40 mmHg  HISTORY: PRIOR HISTORY: Risk factors: CAD, hypertension, hypercholesterolemia, JANICE and morbid obesity  PROCEDURE: The procedure was performed at the bedside  This was a routine study  The transthoracic approach was used  The study included complete 2D imaging, M-mode, complete spectral Doppler, and color Doppler  The heart rate was 66 bpm,  at the start of the study  Images were obtained from the parasternal, apical, subcostal, and suprasternal notch acoustic windows  Echocardiographic views were limited due to decreased penetration and lung interference  Image quality was  adequate  LEFT VENTRICLE: Size was normal  Systolic function was normal  Ejection fraction was estimated in the range of 55 % to 65 %  There were no regional wall motion abnormalities  There was mild concentric hypertrophy  DOPPLER: Doppler  parameters were consistent with abnormal left ventricular relaxation (grade 1 diastolic dysfunction)  RIGHT VENTRICLE: The size was normal  Systolic function was normal  Wall thickness was normal     LEFT ATRIUM: The atrium was mildly dilated  RIGHT ATRIUM: Size was normal     MITRAL VALVE: There was mild annular calcification  Valve structure was normal  There was normal leaflet separation  DOPPLER: The transmitral velocity was within the normal range  There was no evidence for stenosis  There was no  regurgitation  AORTIC VALVE: The valve was not visualized well enough to rule out a bicuspid morphology   Leaflets exhibited marked calcification and markedly reduced cuspal separation  DOPPLER: Transaortic velocity was increased due to valvular stenosis  There was moderate to severe stenosis  There was mild regurgitation  TRICUSPID VALVE: The valve structure was normal  There was normal leaflet separation  DOPPLER: The transtricuspid velocity was within the normal range  There was no evidence for stenosis  There was mild regurgitation  Estimated peak PA  pressure was 40 mmHg  PULMONIC VALVE: Leaflets exhibited normal thickness, no calcification, and normal cuspal separation  DOPPLER: The transpulmonic velocity was within the normal range  There was no regurgitation  PERICARDIUM: There was no pericardial effusion  The pericardium was normal in appearance  AORTA: The root exhibited normal size  SYSTEMIC VEINS: IVC: The inferior vena cava was normal in size and course  Respirophasic changes were normal     SYSTEM MEASUREMENT TABLES    2D  %FS: 36 9 %  Ao Diam: 3 2 cm  EDV(Teich): 89 2 ml  EF(Teich): 67 %  ESV(Teich): 29 5 ml  IVSd: 1 2 cm  LA Area: 25 9 cm2  LA Diam: 4 1 cm  LVEDV MOD A4C: 83 7 ml  LVEF MOD A4C: 83 2 %  LVESV MOD A4C: 14 ml  LVIDd: 4 4 cm  LVIDs: 2 8 cm  LVLd A4C: 8 2 cm  LVLs A4C: 5 9 cm  LVOT Diam: 2 1 cm  LVPWd: 1 2 cm  RA Area: 18 4 cm2  RVIDd: 4 4 cm  SV MOD A4C: 69 6 ml  SV(Teich): 59 7 ml    CW  AR Dec [place]: 2 4 m/s2  AR Dec Time: 1508 3 ms  AR PHT: 437 4 ms  AR Vmax: 3 6 m/s  AR maxP 4 mmHg  AV Env  Ti: 296 9 ms  AV VTI: 90 6 cm  AV Vmax: 4 1 m/s  AV Vmean: 3 1 m/s  AV maxP 1 mmHg  AV meanP 1 mmHg  TR Vmax: 3 3 m/s  TR maxP 3 mmHg    MM  TAPSE: 2 4 cm    PW  VASU (VTI): 1 3 cm2  VASU Vmax: 1 cm2  E': 0 1 m/s  E/E': 18  LVOT Env  Ti: 388 2 ms  LVOT VTI: 34 7 cm  LVOT Vmax: 1 3 m/s  LVOT Vmean: 0 9 m/s  LVOT maxP 6 mmHg  LVOT meanPG: 3 8 mmHg  LVSV Dopp: 114 6 ml  MV A Jose: 1 4 m/s  MV Dec [place]: 1 8 m/s2  MV DecT: 541 5 ms  MV E Jose: 1 m/s  MV E/A Ratio: 0 7  MV PHT: 157 ms  MVA By PHT: 1 4 cm2    [facility]    Prepared and electronically signed by    Marcos Portillo MD  Signed 14-Aug-2018 18:35:14         Medications:    Current Outpatient Prescriptions:     acetaminophen (TYLENOL) 325 mg tablet, Take 2 tablets (650 mg total) by mouth every 6 (six) hours as needed for mild pain or headaches, Disp: 30 tablet, Rfl: 0    aspirin (ECOTRIN LOW STRENGTH) 81 mg EC tablet, Take 1 tablet (81 mg total) by mouth daily, Disp: 100 tablet, Rfl: 0    b complex vitamins capsule, Take 1 capsule by mouth 2 (two) times a day  , Disp: , Rfl:     Calcium Carb-Cholecalciferol (CALCIUM 600 + D PO), Take 1 tablet by mouth 2 (two) times a day, Disp: , Rfl:     clopidogrel (PLAVIX) 75 mg tablet, Take 1 tablet (75 mg total) by mouth daily, Disp: 30 tablet, Rfl: 2    Fesoterodine Fumarate ER (TOVIAZ) 8 MG TB24, Take 8 mg by mouth daily, Disp: , Rfl:     furosemide (LASIX) 20 mg tablet, Take 1 tablet (20 mg total) by mouth daily, Disp: 30 tablet, Rfl: 1    levothyroxine 50 mcg tablet, Take 50 mcg by mouth daily, Disp: , Rfl:     loratadine (CLARITIN) 10 mg tablet, Take 10 mg by mouth daily, Disp: , Rfl:     metoprolol succinate (TOPROL-XL) 25 mg 24 hr tablet, Take 1 tablet (25 mg total) by mouth daily, Disp: 30 tablet, Rfl: 2    pantoprazole (PROTONIX) 40 mg tablet, Take 1 tablet (40 mg total) by mouth daily, Disp: 30 tablet, Rfl: 2    potassium chloride (K-DUR,KLOR-CON) 20 mEq tablet, Take 1 tablet (20 mEq total) by mouth daily, Disp: 30 tablet, Rfl: 1    sertraline (ZOLOFT) 50 mg tablet, Take 50 mg by mouth daily, Disp: , Rfl:     simvastatin (ZOCOR) 40 mg tablet, Take 1 tablet (40 mg total) by mouth daily at bedtime, Disp: 30 tablet, Rfl: 1    temazepam (RESTORIL) 15 mg capsule, Take 15 mg by mouth daily, Disp: , Rfl: 1      Allergies:   Allergies   Allergen Reactions    Latex Rash    Neosporin [Neomycin-Bacitracin Zn-Polymyx] Rash and Other (See Comments)     hives per [facility] order         Assessment and Plan:  1  Shortness of breath   likely multifactorial from severe AS now S/P TAVR, morbid obesity, pulmonary hypertension  Patient should feel better once she starts cardiac rehab    2  Severe aortic stenosis S/P TAVR   continue aspirin  Plavix is for 90 days  Needs antibiotic prophylaxis prior to dental work    3  Chronic diastolic (congestive) heart failure (HCC)   low-salt diet  Daily weight  Continue furosemide, K, beta-blocker  Will add ARB / ACEI later    4  Mitral annular calcification, Non-rheumatic mitral valve stenosis   follow up with serial echocardiograms    5  Essential hypertension   BP stable  Cont beta-blocker, furosemide    6  Mixed hyperlipidemia   continue statin and diet control  Her PCP closely monitors her blood work    7  LVH (left ventricular hypertrophy)   tight BP control    8  Pulmonary hypertension (HCC)   likely from JANICE, MS, AS    9  Morbid obesity ([address] )   unable to lose weight    10  JANICE (obstructive sleep apnea)   regular CPAP use promoted    -  Patient to take furosemide   And potassium every other day for a week and then try to stop at  -   Patient to discuss with pulmonologist if she still needs her oxygen    Recommend aggressive risk factor modification and therapeutic lifestyle changes  Low-salt, low-calorie, low-fat, low-cholesterol diet with regular exercise and to optimize weight  I will defer the ordering and monitoring of necessity lab studies to you, but I am available and happy to review and manage any of the data at your request in the future  Discussed concepts of atherosclerosis, including signs and symptoms of cardiac disease  Previous studies were reviewed  Safety measures were reviewed  Questions were entertained and answered  Patient was advised to report any problems requiring medical attention  Follow-up with PCP and appropriate specialist and lab work as discussed      Return for follow up visit as scheduled or earlier, if needed  Thank you for allowing me to participate in the care and evaluation of your patient  Should you have any questions, please feel free to contact me        Link Estrada MD  10/26/2018,9:22 AM

## 2018-11-02 ENCOUNTER — APPOINTMENT (OUTPATIENT)
Dept: LAB | Facility: HOSPITAL | Age: 78
End: 2018-11-02
Attending: THORACIC SURGERY (CARDIOTHORACIC VASCULAR SURGERY)
Payer: MEDICARE

## 2018-11-02 ENCOUNTER — OFFICE VISIT (OUTPATIENT)
Dept: CARDIAC SURGERY | Facility: CLINIC | Age: 78
End: 2018-11-02
Payer: MEDICARE

## 2018-11-02 ENCOUNTER — HOSPITAL ENCOUNTER (OUTPATIENT)
Dept: NON INVASIVE DIAGNOSTICS | Facility: HOSPITAL | Age: 78
Discharge: HOME | End: 2018-11-02
Attending: THORACIC SURGERY (CARDIOTHORACIC VASCULAR SURGERY)
Payer: MEDICARE

## 2018-11-02 VITALS
WEIGHT: 217 LBS | HEIGHT: 55 IN | BODY MASS INDEX: 50.22 KG/M2 | SYSTOLIC BLOOD PRESSURE: 144 MMHG | HEART RATE: 62 BPM | OXYGEN SATURATION: 88 % | TEMPERATURE: 97.4 F | RESPIRATION RATE: 14 BRPM | DIASTOLIC BLOOD PRESSURE: 64 MMHG

## 2018-11-02 DIAGNOSIS — I35.0 SEVERE AORTIC STENOSIS: ICD-10-CM

## 2018-11-02 DIAGNOSIS — D64.9 ANEMIA, UNSPECIFIED TYPE: ICD-10-CM

## 2018-11-02 DIAGNOSIS — Z51.89 AFTERCARE: ICD-10-CM

## 2018-11-02 DIAGNOSIS — Z95.2 S/P TAVR (TRANSCATHETER AORTIC VALVE REPLACEMENT): ICD-10-CM

## 2018-11-02 DIAGNOSIS — I35.0 SEVERE AORTIC STENOSIS: Primary | ICD-10-CM

## 2018-11-02 DIAGNOSIS — Z48.89 ENCOUNTER FOR POSTOPERATIVE CARE: ICD-10-CM

## 2018-11-02 LAB
ALBUMIN SERPL BCP-MCNC: 3.3 G/DL (ref 3.5–5)
ALP SERPL-CCNC: 69 U/L (ref 46–116)
ALT SERPL W P-5'-P-CCNC: 15 U/L (ref 12–78)
ANION GAP SERPL CALCULATED.3IONS-SCNC: 3 MMOL/L (ref 4–13)
AST SERPL W P-5'-P-CCNC: 17 U/L (ref 5–45)
ATRIAL RATE: 62 BPM
BASOPHILS # BLD AUTO: 0.05 THOUSANDS/ΜL (ref 0–0.1)
BASOPHILS NFR BLD AUTO: 1 % (ref 0–1)
BILIRUB SERPL-MCNC: 0.24 MG/DL (ref 0.2–1)
BUN SERPL-MCNC: 21 MG/DL (ref 5–25)
CALCIUM SERPL-MCNC: 9.3 MG/DL (ref 8.3–10.1)
CHLORIDE SERPL-SCNC: 104 MMOL/L (ref 100–108)
CO2 SERPL-SCNC: 27 MMOL/L (ref 21–32)
CREAT SERPL-MCNC: 0.76 MG/DL (ref 0.6–1.3)
EOSINOPHIL # BLD AUTO: 0.58 THOUSAND/ΜL (ref 0–0.61)
EOSINOPHIL NFR BLD AUTO: 6 % (ref 0–6)
ERYTHROCYTE [DISTWIDTH] IN BLOOD BY AUTOMATED COUNT: 19.9 % (ref 11.6–15.1)
FERRITIN SERPL-MCNC: 15 NG/ML (ref 8–388)
FOLATE SERPL-MCNC: >20 NG/ML (ref 3.1–17.5)
GFR SERPL CREATININE-BSD FRML MDRD: 75 ML/MIN/1.73SQ M
GLUCOSE P FAST SERPL-MCNC: 81 MG/DL (ref 65–99)
HCT VFR BLD AUTO: 30.5 % (ref 34.8–46.1)
HGB BLD-MCNC: 8.9 G/DL (ref 11.5–15.4)
IMM GRANULOCYTES # BLD AUTO: 0.05 THOUSAND/UL (ref 0–0.2)
IMM GRANULOCYTES NFR BLD AUTO: 1 % (ref 0–2)
IRON SATN MFR SERPL: 60 %
IRON SERPL-MCNC: 218 UG/DL (ref 50–170)
LYMPHOCYTES # BLD AUTO: 2.13 THOUSANDS/ΜL (ref 0.6–4.47)
LYMPHOCYTES NFR BLD AUTO: 22 % (ref 14–44)
MCH RBC QN AUTO: 23.7 PG (ref 26.8–34.3)
MCHC RBC AUTO-ENTMCNC: 29.2 G/DL (ref 31.4–37.4)
MCV RBC AUTO: 81 FL (ref 82–98)
MONOCYTES # BLD AUTO: 0.97 THOUSAND/ΜL (ref 0.17–1.22)
MONOCYTES NFR BLD AUTO: 10 % (ref 4–12)
NEUTROPHILS # BLD AUTO: 5.88 THOUSANDS/ΜL (ref 1.85–7.62)
NEUTS SEG NFR BLD AUTO: 60 % (ref 43–75)
NRBC BLD AUTO-RTO: 0 /100 WBCS
P AXIS: 40 DEGREES
PLATELET # BLD AUTO: 354 THOUSANDS/UL (ref 149–390)
PMV BLD AUTO: 9.5 FL (ref 8.9–12.7)
POTASSIUM SERPL-SCNC: 3.8 MMOL/L (ref 3.5–5.3)
PR INTERVAL: 198 MS
PROT SERPL-MCNC: 7.2 G/DL (ref 6.4–8.2)
QRS AXIS: -20 DEGREES
QRSD INTERVAL: 82 MS
QT INTERVAL: 436 MS
QTC INTERVAL: 442 MS
RBC # BLD AUTO: 3.75 MILLION/UL (ref 3.81–5.12)
SODIUM SERPL-SCNC: 134 MMOL/L (ref 136–145)
T WAVE AXIS: 37 DEGREES
TIBC SERPL-MCNC: 363 UG/DL (ref 250–450)
VENTRICULAR RATE: 62 BPM
VIT B12 SERPL-MCNC: 872 PG/ML (ref 100–900)
WBC # BLD AUTO: 9.66 THOUSAND/UL (ref 4.31–10.16)

## 2018-11-02 PROCEDURE — 83550 IRON BINDING TEST: CPT

## 2018-11-02 PROCEDURE — 36415 COLL VENOUS BLD VENIPUNCTURE: CPT

## 2018-11-02 PROCEDURE — 82728 ASSAY OF FERRITIN: CPT

## 2018-11-02 PROCEDURE — 82746 ASSAY OF FOLIC ACID SERUM: CPT

## 2018-11-02 PROCEDURE — 85025 COMPLETE CBC W/AUTO DIFF WBC: CPT

## 2018-11-02 PROCEDURE — 93010 ELECTROCARDIOGRAM REPORT: CPT | Performed by: INTERNAL MEDICINE

## 2018-11-02 PROCEDURE — 80053 COMPREHEN METABOLIC PANEL: CPT

## 2018-11-02 PROCEDURE — 93306 TTE W/DOPPLER COMPLETE: CPT

## 2018-11-02 PROCEDURE — 93005 ELECTROCARDIOGRAM TRACING: CPT

## 2018-11-02 PROCEDURE — 99214 OFFICE O/P EST MOD 30 MIN: CPT | Performed by: NURSE PRACTITIONER

## 2018-11-02 PROCEDURE — 82607 VITAMIN B-12: CPT

## 2018-11-02 PROCEDURE — 83540 ASSAY OF IRON: CPT

## 2018-11-02 NOTE — LETTER
November 2, 2018     [address]    Patient: Stuart Kent   YOB: 1940   Date of Visit: 11/2/2018       Dear Dr Vivek Franks:    Thank you for referring Harrison Sarmiento to me for evaluation  Below are my notes for this consultation  If you have questions, please do not hesitate to call me  I look forward to following your patient along with you  Sincerely,        MABEL Castro        CC: MABEL Samuel, [address]  11/2/2018  2:14 PM  Signed   POST OP FOLLOW UP VISIT S/P TAVR    Procedure: S/P transfemoral transcatheter aortic valve replacement #23 mm [place] NOE S3 bioprosthesis , performed on 10/9/18  History: Stuart Kent is a 65y o  year old female who presents to our office today for routine follow up care from transfemoral transcatheter aortic valve replacement  Her procedure and post op course was uneventful  She was discharged home on 10/9/18  She has had follow up with her PCP and her cardiologist  Today she states she remains tired  She wants to get off the oxygen  She states she is walking but "not as much as she should " He denies SOB, angina or lightheadedness  She denies any issues with her groin sites healing       Review of System:     History obtained from chart review and the patient  General ROS: positive for  - fatigue  negative for - chills or fever  Psychological ROS: negative  Ophthalmic ROS: wears glasses  Hematological and Lymphatic ROS: negative for - bleeding problems or bruising  Respiratory ROS: no cough, shortness of breath, or wheezing; wearing oxygen at 2Lpm   Cardiovascular ROS: no chest pain or dyspnea on exertion  Gastrointestinal ROS: no abdominal pain, change in bowel habits, or black or bloody stools  Genito-Urinary ROS: no dysuria, trouble voiding, or hematuria  Musculoskeletal ROS: negative  Neurological ROS: no TIA or stroke symptoms    Vital Signs:     Vitals:    11/02/18 1300 11/02/18 1327   BP: 142/68 144/64   BP Location: Left arm Right arm   Cuff Size: Adult    Pulse: 62    Resp: 14    Temp: (!) 97 4 °F (36 3 °C)    TempSrc: Oral    SpO2: (!) 88%    Weight: 98 4 kg (217 lb)    Height: 4' 7" (1 397 m)        Home Medications:     Prior to Admission medications    Medication Sig Start Date End Date Taking?  Authorizing Provider   acetaminophen (TYLENOL) 325 mg tablet Take 2 tablets (650 mg total) by mouth every 6 (six) hours as needed for mild pain or headaches 10/11/18   Yg Nicholson PA-C   aspirin (ECOTRIN LOW STRENGTH) 81 mg EC tablet Take 1 tablet (81 mg total) by mouth daily 10/11/18   Yg Nicholson PA-C   b complex vitamins capsule Take 1 capsule by mouth 2 (two) times a day      Historical Provider, MD   Calcium Carb-Cholecalciferol (CALCIUM 600 + D PO) Take 1 tablet by mouth 2 (two) times a day    Historical Provider, MD   clopidogrel (PLAVIX) 75 mg tablet Take 1 tablet (75 mg total) by mouth daily 10/12/18   Yg Nicholson PA-C   Fesoterodine Fumarate ER (TOVIAZ) 8 MG TB24 Take 8 mg by mouth daily    Historical Provider, MD   furosemide (LASIX) 20 mg tablet Take 1 tablet (20 mg total) by mouth daily 10/11/18   Yg Nicholson PA-C   levothyroxine 50 mcg tablet Take 50 mcg by mouth daily    Historical Provider, MD   loratadine (CLARITIN) 10 mg tablet Take 10 mg by mouth daily    Historical Provider, MD   metoprolol succinate (TOPROL-XL) 25 mg 24 hr tablet Take 1 tablet (25 mg total) by mouth daily 10/11/18   Yg Nicholson PA-C   pantoprazole (PROTONIX) 40 mg tablet Take 1 tablet (40 mg total) by mouth daily 10/12/18   Yg Nicholson PA-C   potassium chloride (K-DUR,KLOR-CON) 20 mEq tablet Take 1 tablet (20 mEq total) by mouth daily 10/12/18   Yg Nicholson PA-C   sertraline (ZOLOFT) 50 mg tablet Take 50 mg by mouth daily    Historical Provider, MD   simvastatin (ZOCOR) 40 mg tablet Take 1 tablet (40 mg total) by mouth daily at bedtime 10/11/18   [facility] SHARI Schmitz   temazepam (RESTORIL) 15 mg capsule Take 15 mg by mouth daily 7/2/18   Historical Provider, MD       Physical Exam:    General: Morbidly obese, no acute distress  HEENT/NECK:  PERRLA  No jugular venous distention  Cardiac:Regular rate and rhythm, No murmurs, rubs or gallops  Pulmonary:Breath sounds clear bilaterally  Abdomen:  Non-tender, Non-distended  Positive bowel sounds  Upper extremities: 2+ radial pulses; brisk capillary refill  Lower extremities: Extremities warm/dry  PT/DP pulses 2+ bilaterally  No edema B/L  Incisions: Inguinal puncture site is clean, dry, and intact  Neuro: Alert and oriented X 3  Sensation is grossly intact  No focal deficits  Skin: Warm/Dry, without rashes or lesions  Lab Results:       Results from last 7 days  Lab Units 11/02/18  1244   WBC Thousand/uL 9 66   HEMOGLOBIN g/dL 8 9*   HEMATOCRIT % 30 5*   PLATELETS Thousands/uL 354     BMP pending    Imaging Studies:     Transthoracic Echocardiogram:   Report pending    EKG:   Report pending    I have personally reviewed pertinent reports  TAVR evaluation Assessment:     [address]: I    Assessment: Aortic stenosis, Non-Rheumatic  S/P transfemoral transcatheter aortic valve replacement;    Plan:     Miko Mendoza is making good progress in their recover following transfemoral transcatheter aortic valve replacement  They are at NYHA functional class I  Groin incisions are well healed  Weight and VS are stable  Report of echocardiogram, ECG & BMP is pending; CBC is stable   I reviewed their medications and made no changes  Recommended Plavix therapy after TAVR is for 90 days and aspirin therapy is lifelong    I have discussed the benefits of participating in cardiac rehabilitation and have encouraged them to to do so  Miko Mendoza may resume driving and all normal activities  Miko Mendoza has already been evaluated by their primary care physician and their cardiologist for ongoing medical care  Arrangements will be made for one year follow-up in our office with repeat echocardiogram, ECG, CBC & BMP  I have advised them to notify their PCP with any new concerns that may arise in the interim and to maintain routine follow up with their cardiologist  Lee Ann Lion was comfortable with our recommendations and their questions were answered to their satisfaction      MABEL Sidhu  11/2/18  2:10 PM

## 2018-11-02 NOTE — PROGRESS NOTES
POST OP FOLLOW UP VISIT S/P TAVR    Procedure: S/P transfemoral transcatheter aortic valve replacement #23 mm Cornelius NOE S3 bioprosthesis , performed on 10/9/18  History: Nieves Cooper is a 65y o  year old female who presents to our office today for routine follow up care from transfemoral transcatheter aortic valve replacement  Her procedure and post op course was uneventful  She was discharged home on 10/9/18  She has had follow up with her PCP and her cardiologist  Today she states she remains tired  She wants to get off the oxygen  She states she is walking but "not as much as she should " He denies SOB, angina or lightheadedness  She denies any issues with her groin sites healing  Review of System:     History obtained from chart review and the patient  General ROS: positive for  - fatigue  negative for - chills or fever  Psychological ROS: negative  Ophthalmic ROS: wears glasses  Hematological and Lymphatic ROS: negative for - bleeding problems or bruising  Respiratory ROS: no cough, shortness of breath, or wheezing; wearing oxygen at 2Lpm   Cardiovascular ROS: no chest pain or dyspnea on exertion  Gastrointestinal ROS: no abdominal pain, change in bowel habits, or black or bloody stools  Genito-Urinary ROS: no dysuria, trouble voiding, or hematuria  Musculoskeletal ROS: negative  Neurological ROS: no TIA or stroke symptoms    Vital Signs:     Vitals:    11/02/18 1300 11/02/18 1327   BP: 142/68 144/64   BP Location: Left arm Right arm   Cuff Size: Adult    Pulse: 62    Resp: 14    Temp: (!) 97 4 °F (36 3 °C)    TempSrc: Oral    SpO2: (!) 88%    Weight: 98 4 kg (217 lb)    Height: 4' 7" (1 397 m)        Home Medications:     Prior to Admission medications    Medication Sig Start Date End Date Taking?  Authorizing Provider   acetaminophen (TYLENOL) 325 mg tablet Take 2 tablets (650 mg total) by mouth every 6 (six) hours as needed for mild pain or headaches 10/11/18   Dwaine Short PA-C   aspirin (ECOTRIN LOW STRENGTH) 81 mg EC tablet Take 1 tablet (81 mg total) by mouth daily 10/11/18   Yolande Chen PA-C   b complex vitamins capsule Take 1 capsule by mouth 2 (two) times a day      Historical Provider, MD   Calcium Carb-Cholecalciferol (CALCIUM 600 + D PO) Take 1 tablet by mouth 2 (two) times a day    Historical Provider, MD   clopidogrel (PLAVIX) 75 mg tablet Take 1 tablet (75 mg total) by mouth daily 10/12/18   Yolande Chen PA-C   Fesoterodine Fumarate ER (TOVIAZ) 8 MG TB24 Take 8 mg by mouth daily    Historical Provider, MD   furosemide (LASIX) 20 mg tablet Take 1 tablet (20 mg total) by mouth daily 10/11/18   Yolande Chen PA-C   levothyroxine 50 mcg tablet Take 50 mcg by mouth daily    Historical Provider, MD   loratadine (CLARITIN) 10 mg tablet Take 10 mg by mouth daily    Historical Provider, MD   metoprolol succinate (TOPROL-XL) 25 mg 24 hr tablet Take 1 tablet (25 mg total) by mouth daily 10/11/18   Yolande Chen PA-C   pantoprazole (PROTONIX) 40 mg tablet Take 1 tablet (40 mg total) by mouth daily 10/12/18   Yolande Chen PA-C   potassium chloride (K-DUR,KLOR-CON) 20 mEq tablet Take 1 tablet (20 mEq total) by mouth daily 10/12/18   Yolande Chen PA-C   sertraline (ZOLOFT) 50 mg tablet Take 50 mg by mouth daily    Historical Provider, MD   simvastatin (ZOCOR) 40 mg tablet Take 1 tablet (40 mg total) by mouth daily at bedtime 10/11/18   Yolande Chen PA-C   temazepam (RESTORIL) 15 mg capsule Take 15 mg by mouth daily 7/2/18   Historical Provider, MD       Physical Exam:    General: Morbidly obese, no acute distress  HEENT/NECK:  PERRLA  No jugular venous distention  Cardiac:Regular rate and rhythm, No murmurs, rubs or gallops  Pulmonary:Breath sounds clear bilaterally  Abdomen:  Non-tender, Non-distended  Positive bowel sounds  Upper extremities: 2+ radial pulses; brisk capillary refill  Lower extremities: Extremities warm/dry  PT/DP pulses 2+ bilaterally    No edema B/L  Incisions: Inguinal puncture site is clean, dry, and intact  Neuro: Alert and oriented X 3  Sensation is grossly intact  No focal deficits  Skin: Warm/Dry, without rashes or lesions  Lab Results:       Results from last 7 days  Lab Units 11/02/18  1244   WBC Thousand/uL 9 66   HEMOGLOBIN g/dL 8 9*   HEMATOCRIT % 30 5*   PLATELETS Thousands/uL 354     BMP pending    Imaging Studies:     Transthoracic Echocardiogram:   Report pending    EKG:   Report pending    I have personally reviewed pertinent reports  TAVR evaluation Assessment:     Robert Berry[address]: I    Assessment: Aortic stenosis, Non-Rheumatic  S/P transfemoral transcatheter aortic valve replacement;    Plan:     Christiana Alonzo is making good progress in their recover following transfemoral transcatheter aortic valve replacement  They are at NYHA functional class I  Groin incisions are well healed  Weight and VS are stable  Report of echocardiogram, ECG & BMP is pending; CBC is stable   I reviewed their medications and made no changes  Recommended Plavix therapy after TAVR is for 90 days and aspirin therapy is lifelong    I have discussed the benefits of participating in cardiac rehabilitation and have encouraged them to to do so  Christiana Alonzo may resume driving and all normal activities  Christiana Alonzo has already been evaluated by their primary care physician and their cardiologist for ongoing medical care  Arrangements will be made for one year follow-up in our office with repeat echocardiogram, ECG, CBC & BMP  I have advised them to notify their PCP with any new concerns that may arise in the interim and to maintain routine follow up with their cardiologist  Christiana Alonzo was comfortable with our recommendations and their questions were answered to their satisfaction      MABEL Durán  11/2/18  2:10 PM

## 2018-11-03 PROCEDURE — 93306 TTE W/DOPPLER COMPLETE: CPT | Performed by: INTERNAL MEDICINE

## 2018-11-08 ENCOUNTER — APPOINTMENT (OUTPATIENT)
Dept: LAB | Facility: HOSPITAL | Age: 78
End: 2018-11-08
Payer: MEDICARE

## 2018-11-08 DIAGNOSIS — K92.1 BLACK STOOLS: ICD-10-CM

## 2018-11-08 LAB — HEMOCCULT STL QL IA: NEGATIVE

## 2018-11-08 PROCEDURE — G0328 FECAL BLOOD SCRN IMMUNOASSAY: HCPCS

## 2018-11-23 ENCOUNTER — OFFICE VISIT (OUTPATIENT)
Dept: HEMATOLOGY ONCOLOGY | Facility: CLINIC | Age: 78
End: 2018-11-23
Payer: MEDICARE

## 2018-11-23 VITALS
RESPIRATION RATE: 20 BRPM | HEIGHT: 55 IN | WEIGHT: 226 LBS | HEART RATE: 65 BPM | BODY MASS INDEX: 52.3 KG/M2 | OXYGEN SATURATION: 98 % | TEMPERATURE: 97.3 F

## 2018-11-23 DIAGNOSIS — D64.9 ANEMIA, UNSPECIFIED TYPE: Primary | ICD-10-CM

## 2018-11-23 DIAGNOSIS — Z95.2 S/P TAVR (TRANSCATHETER AORTIC VALVE REPLACEMENT): ICD-10-CM

## 2018-11-23 PROCEDURE — 99202 OFFICE O/P NEW SF 15 MIN: CPT | Performed by: INTERNAL MEDICINE

## 2018-11-23 NOTE — PROGRESS NOTES
HEMATOLOGY / [address] NOTE    Primary Care Provider: MABEL Bhatt  Referring Provider: Bobby Stephens  MRN: [id number]  : 1940    Reason for Encounter:  Chief Complaint   Patient presents with   [address] [place] [address] Patient - Consult (Anemia)         Hematology / Oncology History:     Carri Gillis is a 65 y o  female who came in for  to establish care hematology    anemia  - acute on chronic, hemoglobin 8 9, ongoing for the past 6 months  Interval History:     :  Patient is a 65  female, history of hypertension hyperlipidemia, hypothyroidism, LVH,  valve stenosis, status post TAVR, came in to establish care with hematology  Patient is status post tPA are on 10/09/2018  Tolerate surgery okay  Reported still having some fatigue, otherwise no bleeding  Has been on oral iron and B complex  Tolerated oral iron okay with no abdominal pain, no constipation  No prior history of anemia  No skin color change, on Plavix and aspirin no other blood thinners  Problem list:     Patient Active Problem List   Diagnosis    Hypothyroid    Hypertension    Hyperlipidemia    Reactive airway disease without complication    JANICE (obstructive sleep apnea)    LVH (left ventricular hypertrophy)    Mitral annular calcification    Mitral valve stenosis    Pulmonary hypertension (HCC)    Chronic diastolic (congestive) heart failure (HCC)    Anemia    Obesity, morbid (HCC)    Gastroesophageal reflux disease without esophagitis    Arthritis    AVB (atrioventricular block)    Chronic diastolic congestive heart failure (HCC)    COPD, mild (HCC)    Coronary artery disease    JANICE on CPAP    S/P TAVR (transcatheter aortic valve replacement)    Acute pulmonary insufficiency    Postoperative anemia due to acute blood loss    Encounter for postoperative care       Assessment / Plan:       1  Anemia, unspecified type  - acute on chronic anemia    Chronic anemia going on for 6 months, possible could be due to valve stenosis/hemolysis  Prior to CBC is showing hemoglobin slightly improving from 8 3-8 9  Iron panel is consistent with iron deficiency anemia with iron supplement  - retic count is low normal   We will continue to monitor this  Given WBC platelets are normal, low suspicion for bone marrow disorder       - CBC and differential; Future  - Comprehensive metabolic panel; Future  - Iron Panel; Future  - Retic Count; Future    2  S/P TAVR (transcatheter aortic valve replacement)               30   minutes were spent face to face with patient with greater than 50% of the time spent in counseling or coordination of care including discussions of treatment instructions  All of the patient's questions were answered to their satisfactory during this discussion  Advised pt to call if there is any further questions  PHYSICIAL EXAMINATION:       Vital Signs:   [unfilled]  Body mass index is 52 53 kg/m²  Body surface area is 1 85 meters squared  GEN: Alert, awake oriented x3, in no acute distress  HEENT- No pallor, icterus, cyanosis, no oral mucosal lesions,   LAD - no palpable cervical, clavicle, axillary, inguinal LAD  Heart- normal S1 S2, regular rate and rhythm, No murmur, rubs  Lungs- decreased breathing sound bilateral    Abdomen- soft, Non tender, bowel sounds present  Extremities- No cyanosis, clubbing, edema  Neuro- No focal neurological deficit           PAST MEDICAL HISTORY:   has a past medical history of Anemia (08/22/2018); Arthritis; AVB (atrioventricular block); CHF (congestive heart failure) ([address] ); COPD, mild ([address] ); Coronary artery disease; Dislocation of right shoulder joint; Frequent UTI; GERD (gastroesophageal reflux disease); H/O: pneumonia; Heme positive stool; History of uterine cancer; Hyperlipidemia; Hypertension; Hypothyroidism; Obesity, morbid ([address] ) (08/22/2018); JANICE on CPAP; Pulmonary hypertension ([address] ) (08/22/2018);  Severe aortic stenosis; Simple goiter; and Skin cyst     PAST SURGICAL HISTORY:   has a past surgical history that includes Cholecystectomy; Carpal tunnel release (Bilateral); Breast biopsy; Dilation and curettage of uterus; Hysteroscopy; pr esophagogastroduodenoscopy transoral diagnostic (N/A, 8/31/2018); pr colonoscopy flx dx w/collj spec when pfrmd (N/A, 9/6/2018); Cardiac catheterization; Total knee arthroplasty (Bilateral); Total hip arthroplasty (Left, 2007); Total abdominal hysterectomy; pr replace aortic valve openfemoral artery approach (N/A, 10/9/2018); and pr echo transesophag r-t 2d w/prb img acquisj i&r (N/A, 10/9/2018)      CURRENT MEDICATIONS:   Current Outpatient Prescriptions   Medication Sig Dispense Refill    acetaminophen (TYLENOL) 325 mg tablet Take 2 tablets (650 mg total) by mouth every 6 (six) hours as needed for mild pain or headaches 30 tablet 0    aspirin (ECOTRIN LOW STRENGTH) 81 mg EC tablet Take 1 tablet (81 mg total) by mouth daily 100 tablet 0    b complex vitamins capsule Take 1 capsule by mouth 2 (two) times a day        Calcium Carb-Cholecalciferol (CALCIUM 600 + D PO) Take 1 tablet by mouth 2 (two) times a day      clopidogrel (PLAVIX) 75 mg tablet Take 1 tablet (75 mg total) by mouth daily 30 tablet 2    Fesoterodine Fumarate ER (TOVIAZ) 8 MG TB24 Take 8 mg by mouth daily      furosemide (LASIX) 20 mg tablet Take 1 tablet (20 mg total) by mouth daily 30 tablet 1    levothyroxine 50 mcg tablet Take 50 mcg by mouth daily      loratadine (CLARITIN) 10 mg tablet Take 10 mg by mouth daily      metoprolol succinate (TOPROL-XL) 25 mg 24 hr tablet Take 1 tablet (25 mg total) by mouth daily 30 tablet 2    potassium chloride (K-DUR,KLOR-CON) 20 mEq tablet Take 1 tablet (20 mEq total) by mouth daily 30 tablet 1    sertraline (ZOLOFT) 50 mg tablet Take 50 mg by mouth daily      simvastatin (ZOCOR) 40 mg tablet Take 1 tablet (40 mg total) by mouth daily at bedtime 30 tablet 1    temazepam (RESTORIL) 15 mg capsule Take 15 mg by mouth daily  1     No current facility-administered medications for this visit  [unfilled]    SOCIAL HISTORY:   reports that she has never smoked  She has never used smokeless tobacco  She reports that she does not drink alcohol or use drugs  FAMILY HISTORY:  family history includes Cancer in her family and father; Coronary artery disease in her family; Diabetes in her family, mother, and sister; Heart disease in her sister; Hypertension in her family; Lung cancer in her father; Stroke in her family and mother  ALLERGIES:  is allergic to latex and neosporin [neomycin-bacitracin zn-polymyx]  REVIEW OF SYSTEMS:  Please note that a 14-point review of systems was performed to include Constitutional, HEENT, Respiratory, CVS, GI, , Musculoskeletal, Integumentary, Neurologic, Rheumatologic, Endocrinologic, Psychiatric, Lymphatic, and Hematologic/Oncologic systems were reviewed and are negative unless otherwise stated in HPI  Positive and negative findings pertinent to this evaluation are incorporated into the history of present illness  LAB:  Lab Results   Component Value Date    WBC 9 66 11/02/2018    HGB 8 9 (L) 11/02/2018    HCT 30 5 (L) 11/02/2018    MCV 81 (L) 11/02/2018     11/02/2018     Lab Results   Component Value Date    K 3 8 11/02/2018     11/02/2018    CO2 27 11/02/2018    BUN 21 11/02/2018    CREATININE 0 76 11/02/2018    GLUCOSE 101 10/09/2018    GLUF 81 11/02/2018    CALCIUM 9 3 11/02/2018    CORRECTEDCA 10 8 (H) 08/29/2018    AST 17 11/02/2018    ALT 15 11/02/2018    ALKPHOS 69 11/02/2018    EGFR 75 11/02/2018       IMAGING:  No orders to display     No results found

## 2018-11-29 ENCOUNTER — OFFICE VISIT (OUTPATIENT)
Dept: FAMILY MEDICINE CLINIC | Facility: CLINIC | Age: 78
End: 2018-11-29
Payer: MEDICARE

## 2018-11-29 VITALS
SYSTOLIC BLOOD PRESSURE: 132 MMHG | WEIGHT: 219 LBS | TEMPERATURE: 97.2 F | OXYGEN SATURATION: 93 % | RESPIRATION RATE: 20 BRPM | HEART RATE: 66 BPM | HEIGHT: 55 IN | BODY MASS INDEX: 50.68 KG/M2 | DIASTOLIC BLOOD PRESSURE: 70 MMHG

## 2018-11-29 DIAGNOSIS — I50.32 CHRONIC DIASTOLIC CONGESTIVE HEART FAILURE (HCC): Primary | Chronic | ICD-10-CM

## 2018-11-29 DIAGNOSIS — D64.9 ANEMIA, UNSPECIFIED TYPE: ICD-10-CM

## 2018-11-29 DIAGNOSIS — E83.52 HYPERCALCEMIA: ICD-10-CM

## 2018-11-29 DIAGNOSIS — R06.02 SHORTNESS OF BREATH: ICD-10-CM

## 2018-11-29 DIAGNOSIS — H66.3X2 CHRONIC SUPPURATIVE OTITIS MEDIA OF LEFT EAR, UNSPECIFIED OTITIS MEDIA LOCATION: ICD-10-CM

## 2018-11-29 PROCEDURE — 99214 OFFICE O/P EST MOD 30 MIN: CPT | Performed by: FAMILY MEDICINE

## 2018-11-29 NOTE — ASSESSMENT & PLAN NOTE
Will put out a message to Dr Elvin Eubanks as I have to wonder if the sob is coming from her anemia

## 2018-11-29 NOTE — Clinical Note
Good afternoon  I just saw Hugh Hudson today  She still has moderately severe shortness of breath with exertion and will desaturate down to 86%  She was cleared by Cardiology and taken off her oxygen and Lasix  She has an appointment with Dr Cassandra Nichols next week  I can't help but wonder if this sob is coming from her anemia  Your thoughts?

## 2018-11-29 NOTE — PATIENT INSTRUCTIONS
Discussed all with patient  Keep the follow-up with your ENT specialist and call me after you see him  If we do not have a definitive treatment plan for that chronic left ear pain and effusion I am sending you to Dr Estrada Back  Also, I will put out a call to Dr Coelho as I have to wonder if it is not your anemia that is making you short of breath  Have the additional blood work done I will call with results  Keep the follow-up with Dr Kishore Damico as I suspect she may want to put you back on your oxygen    Will repeat the cmp and pth and if still elevated will refer to Dr Carson Montejo

## 2018-11-30 ENCOUNTER — APPOINTMENT (OUTPATIENT)
Dept: LAB | Facility: HOSPITAL | Age: 78
End: 2018-11-30
Payer: MEDICARE

## 2018-11-30 DIAGNOSIS — E83.52 HYPERCALCEMIA: ICD-10-CM

## 2018-11-30 DIAGNOSIS — R06.02 SHORTNESS OF BREATH: ICD-10-CM

## 2018-11-30 LAB
ALBUMIN SERPL BCP-MCNC: 3.4 G/DL (ref 3.5–5)
ALP SERPL-CCNC: 88 U/L (ref 46–116)
ALT SERPL W P-5'-P-CCNC: 20 U/L (ref 12–78)
ANION GAP SERPL CALCULATED.3IONS-SCNC: 9 MMOL/L (ref 4–13)
AST SERPL W P-5'-P-CCNC: 19 U/L (ref 5–45)
BILIRUB SERPL-MCNC: 0.2 MG/DL (ref 0.2–1)
BUN SERPL-MCNC: 22 MG/DL (ref 5–25)
CALCIUM SERPL-MCNC: 9.5 MG/DL (ref 8.3–10.1)
CHLORIDE SERPL-SCNC: 103 MMOL/L (ref 100–108)
CO2 SERPL-SCNC: 29 MMOL/L (ref 21–32)
CREAT SERPL-MCNC: 0.84 MG/DL (ref 0.6–1.3)
DEPRECATED D DIMER PPP: 1201 NG/ML (FEU)
FERRITIN SERPL-MCNC: 21 NG/ML (ref 8–388)
GFR SERPL CREATININE-BSD FRML MDRD: 67 ML/MIN/1.73SQ M
GLUCOSE P FAST SERPL-MCNC: 92 MG/DL (ref 65–99)
IRON SERPL-MCNC: 20 UG/DL (ref 50–170)
NT-PROBNP SERPL-MCNC: 110 PG/ML
POTASSIUM SERPL-SCNC: 4.3 MMOL/L (ref 3.5–5.3)
PROT SERPL-MCNC: 7.6 G/DL (ref 6.4–8.2)
PTH-INTACT SERPL-MCNC: 71.3 PG/ML (ref 18.4–80.1)
SODIUM SERPL-SCNC: 141 MMOL/L (ref 136–145)
TIBC SERPL-MCNC: 380 UG/DL (ref 250–450)

## 2018-11-30 PROCEDURE — 83970 ASSAY OF PARATHORMONE: CPT

## 2018-11-30 PROCEDURE — 83880 ASSAY OF NATRIURETIC PEPTIDE: CPT

## 2018-11-30 PROCEDURE — 83550 IRON BINDING TEST: CPT

## 2018-11-30 PROCEDURE — 83540 ASSAY OF IRON: CPT

## 2018-11-30 PROCEDURE — 85379 FIBRIN DEGRADATION QUANT: CPT

## 2018-11-30 PROCEDURE — 82728 ASSAY OF FERRITIN: CPT

## 2018-11-30 PROCEDURE — 36415 COLL VENOUS BLD VENIPUNCTURE: CPT

## 2018-11-30 PROCEDURE — 80053 COMPREHEN METABOLIC PANEL: CPT

## 2018-12-01 NOTE — PROGRESS NOTES
Patient returned form cath lab, lying flat in bed  40 minutes @ room air,  O2 saturation 84%  Placed on NC @2L O2 sat 93%  Positive blood cultures from outside facility for MSSA. Negative here 11/30  ID consulted -needs 4-6 weeks of IV antibiotics  Permacath removed  New dialysis catheter placed 12/3 in the left femoral vein, temporary  Continue with cefazolin through 1/12/2018 per ID.

## 2018-12-04 ENCOUNTER — TELEPHONE (OUTPATIENT)
Dept: FAMILY MEDICINE CLINIC | Facility: CLINIC | Age: 78
End: 2018-12-04

## 2018-12-04 NOTE — TELEPHONE ENCOUNTER
She said she was ok with it  She is going to take the medication for two weeks  Then follow up with him and see where it goes from there  But for right now she said she will wait before going to someone else

## 2018-12-04 NOTE — TELEPHONE ENCOUNTER
Is she happy with this opinion? If not, I will refer her to Dr Logan Gunter or Alli  This has gone on since prior to coming to this practice

## 2018-12-04 NOTE — TELEPHONE ENCOUNTER
Steven Potter called said she had her appointment today with the ear doctor  She wanted to let you know that he said it wasn't an infection and its just fluid  Also she has a follow up appointment scheduled in two weeks because he wants to see her back  An if not better he will have to put a tube in

## 2018-12-05 NOTE — TELEPHONE ENCOUNTER
Joseph Tran called and stated the medication was not called in from Dr Antonia Powell  I called Dr Cindy Infante office and spoke to the  and she has a message out to the nurse, Dr nAtonia Powell left this morning to go out of town  Then she checked the chart and did not see a medication in the notes and she will call the nurse and then call the patient to let her know what to do     I left a message on Marisol's machine and waiting for her call back to see if she wants to switch physcians

## 2018-12-05 NOTE — TELEPHONE ENCOUNTER
Italo Huang called back and said she is going to wait for the nurse to call her back  If she doesn't get back to her by tomorrow she will like to switch  She is just a bit concerned about driving to [place] because its so far and also she is currently having car problems

## 2018-12-06 ENCOUNTER — OFFICE VISIT (OUTPATIENT)
Dept: PULMONOLOGY | Facility: CLINIC | Age: 78
End: 2018-12-06
Payer: MEDICARE

## 2018-12-06 VITALS
OXYGEN SATURATION: 95 % | WEIGHT: 223 LBS | SYSTOLIC BLOOD PRESSURE: 130 MMHG | DIASTOLIC BLOOD PRESSURE: 70 MMHG | BODY MASS INDEX: 51.61 KG/M2 | HEART RATE: 62 BPM | HEIGHT: 55 IN

## 2018-12-06 DIAGNOSIS — I27.20 PULMONARY HYPERTENSION (HCC): Chronic | ICD-10-CM

## 2018-12-06 DIAGNOSIS — G47.33 OSA (OBSTRUCTIVE SLEEP APNEA): ICD-10-CM

## 2018-12-06 DIAGNOSIS — R06.00 DYSPNEA ON EXERTION: Primary | ICD-10-CM

## 2018-12-06 PROCEDURE — 99214 OFFICE O/P EST MOD 30 MIN: CPT | Performed by: PHYSICIAN ASSISTANT

## 2018-12-06 RX ORDER — ALBUTEROL SULFATE 90 UG/1
2 AEROSOL, METERED RESPIRATORY (INHALATION) EVERY 6 HOURS PRN
COMMUNITY
End: 2019-01-18 | Stop reason: SDUPTHER

## 2018-12-06 NOTE — PROGRESS NOTES
Assessment:    1  Dyspnea on exertion     2  JANICE (obstructive sleep apnea)     3  Pulmonary hypertension (HCC)       Plan:     Dyspnea on exertion was initially thought to be due to her underlying aortic stenosis as well as pulmonary hypertension  Since her aortic valve replacement she still continues to have dyspnea on exertion  She has not been using her oxygen with exertion  Symptoms were not thought to be due to any asthma or COPD given his mild restriction on her PFT, no improvement with bronchodilators  Was walked again in the office today and her sats would drop to 87%  Discussed with her that using the oxygen may help significantly with her dyspnea on exertion  She does already have a portable concentrator at home, she will begin to use it with exertion and continue using the oxygen at night with her CPAP  Her pulmonary hypertension did improve after the aortic valve replacement, under showed mild increase in the pulmonary artery pressure on her echo done recently  She is also anemic, following with Dr Farhad Granados, this could be contributing to her dyspnea on exertion  She continues to go to cardiac rehab as deconditioning with also likely plays a part  She will follow up with us in 3 months, or sooner if necessary  Subjective:     Patient ID: Guero Marshall is a 65 y o  female  Chief Complaint:  Patient is a 66 yo female nonsmoker with past medical history of aortic stenosis status post TAVR, hypertension, JANICE on CPAP, hyperlipidemia, obesity, pulmonary hypertension  She is supposed to use 2 L nasal cannula with exertion and at night  She is here today for follow-up  She was hospitalized in August for shortness of breath, which was thought to be due to her aortic stenosis and pulmonary hypertension  She did undergo aortic valve replacement in October  She has not been using her oxygen with exertion, does uses at nighttime with her CPA P   She still complains of shortness of breath/dyspnea on exertion  Symptoms are similar to prior to her aortic valve replacement  She is also anemic which may be contributing to her shortness of breath  The following portions of the patient's history were reviewed in this encounter and updated as appropriate:   Review of Systems   Constitutional: Negative  HENT: Negative  Respiratory: Positive for shortness of breath  Cardiovascular: Negative  Gastrointestinal: Negative  Genitourinary: Negative  Musculoskeletal: Positive for arthralgias and gait problem  Skin: Negative  Allergic/Immunologic: Negative  Psychiatric/Behavioral: Negative  Objective:  /70   Pulse 62   Ht 4' 7" (1 397 m)   Wt 101 kg (223 lb)   SpO2 95%   BMI 51 83 kg/m²   Physical Exam   Constitutional: She appears well-developed and well-nourished  No distress  HENT:   Mouth/Throat: Oropharynx is clear and moist    Eyes: Pupils are equal, round, and reactive to light  Cardiovascular: Normal rate and regular rhythm  Pulmonary/Chest: Effort normal and breath sounds normal  No respiratory distress  She has no decreased breath sounds  She has no wheezes  She has no rhonchi  She has no rales  Abdominal: Soft  There is no tenderness  Musculoskeletal: Normal range of motion  She exhibits no edema  Neurological: She is alert  Skin: Skin is warm and dry  Psychiatric: She has a normal mood and affect

## 2018-12-10 NOTE — OP NOTE
OPERATIVE REPORT  PATIENT NAME: Bhumika Vogt    :  1940  MRN: [id number]  Pt Location: HYBRID OR    SURGERY DATE: 10/9/2018      Co-Surgeons: Trista Chavez DO; Fernando Álvarez MD    ASSISTANT: Kranthi Eaton DO      PREOPERATIVE DIAGNOSIS Symptomatic severe aortic stenosis  POSTOPERATIVE DIAGNOSIS Symptomatic severe aortic stenosis  PROCEDURE Transcatheter aortic valve replacement with a 23 mm Cornelius NOE 3 bioprosthetic valve via a percutaneous left transfemoral approach  ANESTHESIA Dr Pang Speaker, general endotracheal anesthesia with transesophageal echocardiogram guidance  After informed consent was obtained, patient brought to hybrid operating room in fasting state  Time out was performed  Using modified seldinger technique sheaths were placed in the right   femoral artery and vein respectively  The left   femoral artery was also used for placement of delivery sheath  The vessel was accessed using modified seldinger technique  Using fluoroscopy a temporary wire was advanced into RV apex through transfemoral sheath  The coplanar view was obtained using pigtail catheter placed in ascending aorta with subsequent ascending aortography  The TAVR delivery sheath was ulltimately advanced over a stiff wire and balloon aortic valvuloplasty was performed with rapid pacing  Next the 23 mm Cornelius NOE 3 valve was implanted using rapid pacing with fluoro and GARRISON guidance  There was no significant paravalvular leak appreciated post implant  The sheaths were removed and hemostasis obtained by manual compression of the venous sheath  The  arterial sheath used for  Pigtail catheter insertion and ascending aortography was closed with an Angioseal  system  The TAVR delivery sheath was removed and percutaneous closure was obtained using Preclose technique with two Proglide devices deployed pre sheath insertion   Patient left the hybrid operating room in stable condition  Impression  Successful 23 mm Cornelius NOE 3 transcatheter aortic valve replacement         SIGNATURE: Ariana Garcia DO  DATE: December 10, 2018  TIME: 10:27 AM

## 2018-12-12 RX ORDER — FLUTICASONE PROPIONATE 50 MCG
SPRAY, SUSPENSION (ML) NASAL
Refills: 0 | COMMUNITY
Start: 2018-12-06 | End: 2021-06-10 | Stop reason: ALTCHOICE

## 2018-12-14 ENCOUNTER — OFFICE VISIT (OUTPATIENT)
Dept: FAMILY MEDICINE CLINIC | Facility: CLINIC | Age: 78
End: 2018-12-14
Payer: MEDICARE

## 2018-12-14 VITALS
HEART RATE: 77 BPM | SYSTOLIC BLOOD PRESSURE: 120 MMHG | WEIGHT: 223 LBS | BODY MASS INDEX: 51.61 KG/M2 | HEIGHT: 55 IN | RESPIRATION RATE: 16 BRPM | DIASTOLIC BLOOD PRESSURE: 72 MMHG | TEMPERATURE: 97.5 F | OXYGEN SATURATION: 91 %

## 2018-12-14 DIAGNOSIS — Z95.2 S/P TAVR (TRANSCATHETER AORTIC VALVE REPLACEMENT): ICD-10-CM

## 2018-12-14 DIAGNOSIS — I27.20 PULMONARY HYPERTENSION (HCC): ICD-10-CM

## 2018-12-14 DIAGNOSIS — E66.01 OBESITY, MORBID (HCC): Chronic | ICD-10-CM

## 2018-12-14 DIAGNOSIS — E03.9 HYPOTHYROIDISM, UNSPECIFIED TYPE: ICD-10-CM

## 2018-12-14 DIAGNOSIS — R06.00 DYSPNEA ON EXERTION: ICD-10-CM

## 2018-12-14 DIAGNOSIS — R53.83 OTHER FATIGUE: Primary | ICD-10-CM

## 2018-12-14 DIAGNOSIS — D64.9 ANEMIA, UNSPECIFIED TYPE: ICD-10-CM

## 2018-12-14 PROCEDURE — 99214 OFFICE O/P EST MOD 30 MIN: CPT | Performed by: FAMILY MEDICINE

## 2018-12-14 NOTE — PROGRESS NOTES
Assessment/Plan:     Chronic Problems:  S/P TAVR (transcatheter aortic valve replacement)  Keep the f/u with Dr Merlyn Hopkins  Obesity, morbid ([facility] )  Will work on weight when finished with cardiac rehab  Visit Diagnosis:  Diagnoses and all orders for this visit:    Other fatigue  -     CBC and differential; Future  -     Comprehensive metabolic panel; Future  -     TSH, 3rd generation with Free T4 reflex; Future  -     Ambulatory referral to Hematology / Oncology; Future  -     Thalassemia and Hemoglobinopathy Comp; Future    Anemia, unspecified type  Comments: Will refer for 2nd opinion with Dr Jero Larson as I suspect this is contributing to her sob  GI work up essentially negative with no source of gi bleed  Orders:  -     CBC and differential; Future  -     Comprehensive metabolic panel; Future  -     Urinalysis with microscopic  -     Microalbumin / creatinine urine ratio  -     Iron, TIBC and Ferritin Panel; Future  -     Iron Saturation %; Future  -     Vitamin B12; Future  -     Folate; Future  -     Ambulatory referral to Hematology / Oncology; Future  -     Thalassemia and Hemoglobinopathy Comp; Future    Dyspnea on exertion  -     Ambulatory referral to Hematology / Oncology; Future  -     Thalassemia and Hemoglobinopathy Comp; Future    S/P TAVR (transcatheter aortic valve replacement)  -     Ambulatory referral to Hematology / Oncology; Future    Hypothyroidism, unspecified type  -     TSH, 3rd generation with Free T4 reflex; Future    Mild pulmonary hypertension ([facility] )  Comments: Will continue to follow  [facility]          Subjective:    Patient ID: Frieda Becker is a 77 y o  female  Patient is here for follow-up on her shortness of breath  Was sent to Dr Zach Barragan as patient has a significant anemia  Apparently Dr Zach Barragan did not feel that her anemia was causing her shortness of breath  Currently started on physical therapy s/p tavr but only went once this week  Follows with Dr Merlyn Hopkins   No chest pains or palpitations  Still having problems with her left ear  Takes all other meds as directed  No side effects noted  The following portions of the patient's history were reviewed and updated as appropriate: allergies, current medications, past family history, past medical history, past social history, past surgical history and problem list     Review of Systems   Constitutional: Positive for fatigue  Negative for chills, diaphoresis and fever  HENT: Negative for congestion, ear pain (but still with clogged sensation in the left ear  Given nasal spray by Dr Sim Hernández  ), sneezing and sore throat  Eyes: Negative  Respiratory: Positive for shortness of breath  Negative for cough and wheezing  Cardiovascular: Positive for leg swelling  Negative for chest pain and palpitations  Gastrointestinal: Negative  Genitourinary: Negative  Neurological: Positive for headaches  Negative for dizziness and light-headedness  Still has a funny feeling in the head off and on that she thinks is from the left ear pain  /72   Pulse 77   Temp 97 5 °F (36 4 °C)   Resp 16   Ht 4' 7" (1 397 m)   Wt 101 kg (223 lb)   SpO2 91%   BMI 51 83 kg/m²   Social History     Social History    Marital status: Single     Spouse name: N/A    Number of children: N/A    Years of education: N/A     Occupational History    Not on file       Social History Main Topics    Smoking status: Never Smoker    Smokeless tobacco: Never Used    Alcohol use No    Drug use: No    Sexual activity: No     Other Topics Concern    Not on file     Social History Narrative    Denied drinking coffee    Denied exercise habits         Past Medical History:   Diagnosis Date    Anemia 08/22/2018    Arthritis     AVB (atrioventricular block)     first degree    CHF (congestive heart failure) (HCC)     COPD, mild (HCC)     Coronary artery disease     Dislocation of right shoulder joint     Frequent UTI     GERD (gastroesophageal reflux disease)     H/O: pneumonia     Heme positive stool     History of uterine cancer     s/p CALLIE    Hyperlipidemia     Hypertension     Hypothyroidism     Obesity, morbid ([facility] ) 08/22/2018    JANICE on CPAP     [facility] 08/22/2018    Severe aortic stenosis     Simple goiter     Skin cyst     within the armpits, right     Family History   Problem Relation Age of Onset    Diabetes Mother     Stroke Mother     Cancer Father     Lung cancer Father     Diabetes Sister     Heart disease Sister     Coronary artery disease Family     Diabetes Family     Hypertension Family     Cancer Family     Stroke Family      Past Surgical History:   Procedure Laterality Date    BREAST BIOPSY      CARDIAC CATHETERIZATION      CARPAL TUNNEL RELEASE Bilateral     CHOLECYSTECTOMY      DILATION AND CURETTAGE OF UTERUS      HYSTEROSCOPY      TX COLONOSCOPY FLX DX W/COLLJ SPEC WHEN PFRMD N/A 9/6/2018    Procedure: COLONOSCOPY;  Surgeon: Shannon Barrientos MD;  Location: [facility]; Service: Gastroenterology    TX ECHO TRANSESOPHAG R-T 2D W/PRB IMG ACQUISJ I&R N/A 10/9/2018    Procedure: INTRA-OP TRANSESOPHAGEAL ECHOCARDIOGRAM (GARRISON); Surgeon: Vicky Alvarez DO;  [facility]: [facility];  Service: Cardiac Surgery    TX ESOPHAGOGASTRODUODENOSCOPY TRANSORAL DIAGNOSTIC N/A 8/31/2018    Procedure: ESOPHAGOGASTRODUODENOSCOPY (EGD); Surgeon: Shannon Barrientos MD;  Location: [facility]; Service: Gastroenterology    TX REPLACE AORTIC VALVE OPENFEMORAL ARTERY APPROACH N/A 10/9/2018    Procedure: REPLACEMENT AORTIC VALVE TRANSCATHETER (TAVR) TRANSFEMORAL W/ 23 MM MENDOZA NOE S3 VALVE (ACCESS OF LEFT);   Surgeon: Vicky Alvarez DO;  [facility]: [facility];  Service: Cardiac Surgery    TOTAL ABDOMINAL HYSTERECTOMY      TOTAL HIP ARTHROPLASTY Left 2007    TOTAL KNEE ARTHROPLASTY Bilateral        Current Outpatient Prescriptions:     acetaminophen (TYLENOL) 325 mg tablet, Take 2 tablets (650 mg total) by mouth every 6 (six) hours as needed for mild pain or headaches (Patient not taking: Reported on 12/6/2018 ), Disp: 30 tablet, Rfl: 0    albuterol (PROVENTIL HFA,VENTOLIN HFA) 90 mcg/act inhaler, Inhale 2 puffs every 6 (six) hours as needed for wheezing, Disp: , Rfl:     aspirin (ECOTRIN LOW STRENGTH) 81 mg EC tablet, Take 1 tablet (81 mg total) by mouth daily, Disp: 100 tablet, Rfl: 0    b complex vitamins capsule, Take 1 capsule by mouth 2 (two) times a day  , Disp: , Rfl:     Calcium Carb-Cholecalciferol (CALCIUM 600 + D PO), Take 1 tablet by mouth 2 (two) times a day, Disp: , Rfl:     clopidogrel (PLAVIX) 75 mg tablet, Take 1 tablet (75 mg total) by mouth daily, Disp: 30 tablet, Rfl: 2    Fesoterodine Fumarate ER (TOVIAZ) 8 MG TB24, Take 8 mg by mouth daily, Disp: , Rfl:     fluticasone (FLONASE) 50 mcg/act nasal spray, SPRAY 2 SPRAYS INTO EACH NOSTRIL EVERY DAY, Disp: , Rfl: 0    levothyroxine 50 mcg tablet, Take 50 mcg by mouth daily, Disp: , Rfl:     loratadine (CLARITIN) 10 mg tablet, Take 10 mg by mouth daily, Disp: , Rfl:     metoprolol succinate (TOPROL-XL) 25 mg 24 hr tablet, Take 1 tablet (25 mg total) by mouth daily, Disp: 30 tablet, Rfl: 2    potassium chloride (K-DUR,KLOR-CON) 20 mEq tablet, Take 1 tablet (20 mEq total) by mouth daily (Patient not taking: Reported on 12/6/2018 ), Disp: 30 tablet, Rfl: 1    sertraline (ZOLOFT) 50 mg tablet, Take 50 mg by mouth daily, Disp: , Rfl:     simvastatin (ZOCOR) 40 mg tablet, Take 1 tablet (40 mg total) by mouth daily at bedtime, Disp: 30 tablet, Rfl: 1    temazepam (RESTORIL) 15 mg capsule, Take 15 mg by mouth daily, Disp: , Rfl: 1    Allergies   Allergen Reactions    Latex Rash    Neosporin [Neomycin-Bacitracin Zn-Polymyx] Rash and Other (See Comments)     hives per [facility] order          Lab Review   Appointment on 11/30/2018   Component Date Value    NT-proBNP 11/30/2018 110     D-Dimer, Afshan Blow 11/30/2018 1201*    PTH 11/30/2018 71 3     Sodium 11/30/2018 141     Potassium 11/30/2018 4 3     Chloride 11/30/2018 103     CO2 11/30/2018 29     ANION GAP 11/30/2018 9     BUN 11/30/2018 22     Creatinine 11/30/2018 0 84     Glucose, Fasting 11/30/2018 92     Calcium 11/30/2018 9 5     AST 11/30/2018 19     ALT 11/30/2018 20     Alkaline Phosphatase 11/30/2018 88     Total Protein 11/30/2018 7 6     Albumin 11/30/2018 3 4*    Total Bilirubin 11/30/2018 0 20     eGFR 11/30/2018 67     Iron 11/30/2018 20*    Ferritin 11/30/2018 21     TIBC 11/30/2018 380    Appointment on 11/08/2018   Component Date Value    OCCULT BLD, FECAL IMMUNO* 11/08/2018 Negative    Hospital Outpatient Visit on 11/02/2018   Component Date Value    Ventricular Rate 11/02/2018 62     Atrial Rate 11/02/2018 62     VA Interval 11/02/2018 198     QRSD Interval 11/02/2018 82     QT Interval 11/02/2018 436     QTC Interval 11/02/2018 442     P Axis 11/02/2018 40     QRS Axis 11/02/2018 -20     T Wave [place] 11/02/2018 37    Appointment on 11/02/2018   Component Date Value    WBC 11/02/2018 9 66     RBC 11/02/2018 3 75*    Hemoglobin 11/02/2018 8 9*    Hematocrit 11/02/2018 30 5*    MCV 11/02/2018 81*    MCH 11/02/2018 23 7*    MCHC 11/02/2018 29 2*    RDW 11/02/2018 19 9*    MPV 11/02/2018 9 5     Platelets 75/52/7642 354     nRBC 11/02/2018 0     Neutrophils Relative 11/02/2018 60     Immat GRANS % 11/02/2018 1     Lymphocytes Relative 11/02/2018 22     Monocytes Relative 11/02/2018 10     Eosinophils Relative 11/02/2018 6     Basophils Relative 11/02/2018 1     Neutrophils Absolute 11/02/2018 5 88     Immature Grans Absolute 11/02/2018 0 05     Lymphocytes Absolute 11/02/2018 2 13     Monocytes Absolute 11/02/2018 0 97     Eosinophils Absolute 11/02/2018 0 58     Basophils Absolute 11/02/2018 0 05     Vitamin B-12 11/02/2018 872     Folate 11/02/2018 >20 0*    Sodium 11/02/2018 134*    Potassium 11/02/2018 3 8     Chloride 11/02/2018 104     CO2 11/02/2018 27     ANION GAP 11/02/2018 3*    BUN 11/02/2018 21     Creatinine 11/02/2018 0 76     Glucose, Fasting 11/02/2018 81     Calcium 11/02/2018 9 3     AST 11/02/2018 17     ALT 11/02/2018 15     Alkaline Phosphatase 11/02/2018 69     Total Protein 11/02/2018 7 2     Albumin 11/02/2018 3 3*    Total Bilirubin 11/02/2018 0 24     eGFR 11/02/2018 75     Iron Saturation 11/02/2018 60     TIBC 11/02/2018 363     Iron 11/02/2018 218*    Ferritin 11/02/2018 15         Imaging: No results found  Objective:     Physical Exam   Constitutional: She is oriented to person, place, and time  She appears well-developed and well-nourished  No distress  HENT:   Head: Normocephalic and atraumatic  Right Ear: External ear normal    Mouth/Throat: Oropharynx is clear and moist    Still with mild left tm injection but significantly better  Eyes: Pupils are equal, round, and reactive to light  Conjunctivae and EOM are normal  Right eye exhibits no discharge  Left eye exhibits no discharge  Neck: Normal range of motion  Neck supple  Cardiovascular: Normal rate and regular rhythm  Exam reveals no friction rub  No murmur heard  Grade 1/6 systolic murmur  Pulmonary/Chest: Effort normal and breath sounds normal  No respiratory distress  She has no wheezes  She has no rales  Abdominal: Soft  Bowel sounds are normal    Pt is morbidly obese with a pendulous abdomen  Musculoskeletal: Normal range of motion  She exhibits no edema, tenderness or deformity  Ambulates with a walker    Neurological: She is alert and oriented to person, place, and time  No cranial nerve deficit  Skin: Skin is warm and dry  No rash noted  She is not diaphoretic  Psychiatric: She has a normal mood and affect  Her behavior is normal  Judgment and thought content normal          Patient Instructions   Discussed all with patient and her sister    I still suspect that your anemia has something to do with your shortness of breath  Will send for 2nd opinion with Dr Pauline Vanegas and send all gi studies and recent labs  Have the labs done and I will call with results  Keep the f/u with Dr Rola Mishra and f/u with me mid January  If still with ear discomfort and no ear tube will consider 2nd opinion for this as well as pt has had ear pain for months  Will work on weight when finished with cardiac rehab         MABEL Bhatt

## 2018-12-14 NOTE — PATIENT INSTRUCTIONS
Discussed all with patient and her sister  I still suspect that your anemia has something to do with your shortness of breath  Will send for 2nd opinion with Dr Willem Araiza and send all gi studies and recent labs  Have the labs done and I will call with results  Keep the f/u with Dr Arnett Husbands and f/u with me mid January  If still with ear discomfort and no ear tube will consider 2nd opinion for this as well as pt has had ear pain for months  Will work on weight when finished with cardiac rehab

## 2018-12-20 ENCOUNTER — OFFICE VISIT (OUTPATIENT)
Dept: CARDIOLOGY CLINIC | Facility: CLINIC | Age: 78
End: 2018-12-20
Payer: MEDICARE

## 2018-12-20 VITALS
OXYGEN SATURATION: 95 % | DIASTOLIC BLOOD PRESSURE: 62 MMHG | SYSTOLIC BLOOD PRESSURE: 122 MMHG | HEART RATE: 62 BPM | BODY MASS INDEX: 51.38 KG/M2 | HEIGHT: 55 IN | WEIGHT: 222 LBS

## 2018-12-20 DIAGNOSIS — G47.33 OSA (OBSTRUCTIVE SLEEP APNEA): ICD-10-CM

## 2018-12-20 DIAGNOSIS — E78.2 MIXED HYPERLIPIDEMIA: ICD-10-CM

## 2018-12-20 DIAGNOSIS — E66.01 MORBID OBESITY (HCC): ICD-10-CM

## 2018-12-20 DIAGNOSIS — I27.20 PULMONARY HYPERTENSION (HCC): ICD-10-CM

## 2018-12-20 DIAGNOSIS — I10 ESSENTIAL HYPERTENSION: ICD-10-CM

## 2018-12-20 DIAGNOSIS — I51.7 LVH (LEFT VENTRICULAR HYPERTROPHY): ICD-10-CM

## 2018-12-20 DIAGNOSIS — I34.2 NON-RHEUMATIC MITRAL VALVE STENOSIS: ICD-10-CM

## 2018-12-20 DIAGNOSIS — I35.0 SEVERE AORTIC STENOSIS: Primary | ICD-10-CM

## 2018-12-20 DIAGNOSIS — Z95.2 S/P TAVR (TRANSCATHETER AORTIC VALVE REPLACEMENT): ICD-10-CM

## 2018-12-20 DIAGNOSIS — I50.32 CHRONIC DIASTOLIC (CONGESTIVE) HEART FAILURE (HCC): ICD-10-CM

## 2018-12-20 DIAGNOSIS — I05.9 MITRAL ANNULAR CALCIFICATION: ICD-10-CM

## 2018-12-20 PROCEDURE — 99214 OFFICE O/P EST MOD 30 MIN: CPT | Performed by: INTERNAL MEDICINE

## 2018-12-20 NOTE — PROGRESS NOTES
CARDIOLOGY OFFICE VISIT  [facility]  [address], [address] [place] [address], [place], [facility] Tracy Borden  Tel: [phone]      NAME: Nieves Cooper  AGE: 77 y o  SEX: female  : 1940   MRN: [id number]      Chief Complaint:  Chief Complaint   Patient presents with    Follow-up     2 month follow-up  having SOB         History of Present Illness:   Patient comes for follow up  States she still feels weak  Being treated for anemia by her PCP  Denies CP, palpitations, lightheadedness, syncope, swelling feet, orthopnea, PND, claudication      Severe aortic stenosis S/P TAVR on 10/9/18 -  On ASA, Plavix (for 90 days total)    Chronic diastolic congestive heart failure -  Currently euvolemic  On prn furosemide  Cont beta-blocker  Not on ACE-inhibitor/ARB currently  Recently started on home oxygen and needs to address it with her pulmonologist if it is needed     Hypertension, LVH, DD -  Has had it for many years  Takes her medications regularly  Denies lightheadedness, headache, medication side effects        Hyperlipidemia -  Has had it for few years  Takes her medications regularly  Denies myalgia  Her PCP closely monitor the blood work     Mitral stenosis -  Stable  Follow-up with serial echocardiograms    PHTN - from JANIEC, valvular disease     Morbid obesity -  Unable to lose weight due to inability to exercise    JANICE - now uses CPAP   She does have history of pulmonary hypertension       Past Medical History:  Past Medical History:   Diagnosis Date    Anemia 2018    Arthritis     AVB (atrioventricular block)     first degree    CHF (congestive heart failure) (HCC)     COPD, mild (HCC)     Coronary artery disease     Dislocation of right shoulder joint     Frequent UTI     GERD (gastroesophageal reflux disease)     H/O: pneumonia     Heme positive stool     History of uterine cancer     s/p CALLIE    Hyperlipidemia     Hypertension     Hypothyroidism     Obesity, morbid ([facility] [address] ) 08/22/2018    JANICE on CPAP     Pulmonary hypertension (HCC) 08/22/2018    Severe aortic stenosis     Simple goiter     Skin cyst     within the armpits, right         Past Surgical History:  Past Surgical History:   Procedure Laterality Date    BREAST BIOPSY      CARDIAC CATHETERIZATION      CARPAL TUNNEL RELEASE Bilateral     CHOLECYSTECTOMY      DILATION AND CURETTAGE OF UTERUS      HYSTEROSCOPY      TX COLONOSCOPY FLX DX W/COLLJ SPEC WHEN PFRMD N/A 9/6/2018    Procedure: COLONOSCOPY;  Surgeon: Kortney Tidwell MD;  Location: [facility]; Service: Gastroenterology    TX ECHO TRANSESOPHAG R-T 2D W/PRB IMG ACQUISJ I&R N/A 10/9/2018    Procedure: INTRA-OP TRANSESOPHAGEAL ECHOCARDIOGRAM (GARRISON); Surgeon: Kandi Stephen DO;  Location: [facility];  Service: Cardiac Surgery    TX ESOPHAGOGASTRODUODENOSCOPY TRANSORAL DIAGNOSTIC N/A 8/31/2018    Procedure: ESOPHAGOGASTRODUODENOSCOPY (EGD); Surgeon: Kortney Tidwell MD;  [facility]: [facility]; Service: Gastroenterology    TX REPLACE AORTIC VALVE OPENFEMORAL ARTERY APPROACH N/A 10/9/2018    Procedure: REPLACEMENT AORTIC VALVE TRANSCATHETER (TAVR) TRANSFEMORAL W/ 23 MM MENDOZA NOE S3 VALVE (ACCESS OF LEFT);   Surgeon: Kandi Stephen DO;  [facility];  Service: Cardiac Surgery    TOTAL ABDOMINAL HYSTERECTOMY      TOTAL HIP ARTHROPLASTY Left 2007    TOTAL KNEE ARTHROPLASTY Bilateral          Family History:  Family History   Problem Relation Age of Onset    Diabetes Mother     Stroke Mother     Cancer Father     Lung cancer Father     Diabetes Sister     Heart disease Sister     Coronary artery disease Family     Diabetes Family     Hypertension Family     Cancer Family     Stroke Family          Social History:  Social History     Social History    Marital status: Single     Spouse name: N/A    Number of children: N/A    Years of education: N/A     Social History Main Topics    Smoking status: Never Smoker    Smokeless tobacco: Never Used    Alcohol use No    Drug use: No    Sexual activity: No     Other Topics Concern    Not on file     Social History Narrative    Denied drinking coffee    Denied exercise habits             Active Problems:  Patient Active Problem List   Diagnosis    Hypothyroid    Hypertension    Hyperlipidemia    Reactive airway disease without complication    JANICE (obstructive sleep apnea)    LVH (left ventricular hypertrophy)    Mitral annular calcification    Mitral valve stenosis    Pulmonary hypertension (HCC)    Chronic diastolic (congestive) heart failure (HCC)    Anemia    Obesity, morbid (HCC)    Gastroesophageal reflux disease without esophagitis    Arthritis    AVB (atrioventricular block)    Chronic diastolic congestive heart failure (HCC)    COPD, mild (HCC)    Coronary artery disease    JANICE on CPAP    S/P TAVR (transcatheter aortic valve replacement)    Acute pulmonary insufficiency    Postoperative anemia due to acute blood loss    Encounter for postoperative care         The following portions of the patient's history were reviewed and updated as appropriate: past medical history, past surgical history, past family history,  past social history, current medications, allergies and problem list       Review of Systems:  Constitutional: Denies fever, chills  +fatigue  Eyes: Denies eye redness, eye discharge, double vision  ENT: Denies hearing loss, tinnitus, sneezing, nasal discharge, sore throat   Respiratory: Denies cough, expectoration, hemoptysis   +shortness of breath  Cardiovascular: Denies chest pain, palpitations, orthopnea, PND, lower extremity swelling  Gastrointestinal: Denies abdominal pain, nausea, vomiting, hematemesis, diarrhea, bloody stools  Genito-Urinary: Denies dysuria, incontinence  Musculoskeletal: Denies back pain, joint pain, muscle pain  Neurologic: Denies confusion, lightheadedness, syncope, headache, focal weakness, sensory changes, seizures  Endocrine: Denies polyuria, polydipsia, temperature intolerance  Allergy and Immunology: Denies hives, insect bite sensitivity  Hematological and Lymphatic: Denies bleeding problems, swollen glands   Psychological: Denies depression, suicidal ideation, anxiety, panic  Dermatological: Denies pruritus, rash, skin lesion changes      Vitals:  Vitals:    12/20/18 1309   BP: 122/62   Pulse: 62   SpO2: 95%       Body mass index is 51 6 kg/m²  Weight (last 2 days)     Date/Time   Weight    12/20/18 1309  101 (222)                Physical Examination:  General:   Morbidly obese  On O2  Using a walker for support  Patient is not in acute distress  Awake, alert, oriented in time, place and person  Responding to commands  Head: Normocephalic  Atraumatic  Eyes: Both pupils normal sized, round and reactive to light  Nonicteric  ENT: Normal external ear canals  Nares normal, no drainage  Lips and oral mucosa normal  Neck: Supple  JVP not raised  Trachea central  No thyromegaly  Lungs: Bilateral bronchovascular breath sounds with no crackles or rhonchi  Chest wall: No tenderness  Cardiovascular: RRR  [place] prosthetic valve sound+  Gastrointestinal: Abdomen soft, nontender  No guarding or rigidity  Liver and spleen not palpable  Bowel sounds present  Neurologic: Patient is awake, alert, oriented in time, place and person  Responding to command  Moving all extremities  Integumentary:  No skin rash  Lymphatic: No cervical lymphadenopathy  Back: Symmetric   No CVA tenderness  Extremities: No clubbing, cyanosis or edema      Laboratory Results:  CBC with diff:   Lab Results   Component Value Date    WBC 9 66 11/02/2018    RBC 3 75 (L) 11/02/2018    HGB 8 9 (L) 11/02/2018    HCT 30 5 (L) 11/02/2018    MCV 81 (L) 11/02/2018    MCH 23 7 (L) 11/02/2018    RDW 19 9 (H) 11/02/2018     11/02/2018       CMP:  Lab Results   Component Value Date    CREATININE 0 84 11/30/2018    BUN 22 2018    K 4 3 2018     2018    CO2 29 2018    CO2 32 10/09/2018    GLUCOSE 101 10/09/2018    ALKPHOS 88 2018    ALT 20 2018    AST 19 2018       Lab Results   Component Value Date    HGBA1C 5 7 2018    MG 2 0 2018       Lab Results   Component Value Date    TROPONINI <0 02 2018    TROPONINI <0 02 2018    TROPONINI <0 02 2018       Lipid Profile:   No results found for: CHOL  Lab Results   Component Value Date    HDL 59 08/15/2018    HDL 65 (H) 2018     Lab Results   Component Value Date    LDLCALC 72 08/15/2018    LDLCALC 51 2018     Lab Results   Component Value Date    TRIG 112 08/15/2018    TRIG 83 2018       Cardiac testing:   Results for orders placed during the hospital encounter of 18   Echo complete with contrast if indicated    Narrative [address] [place] [address]  [phone]    Transthoracic Echocardiogram  2D, M-mode, Doppler, and Color Doppler    Study date:  14-Aug-2018    Patient: Robert Arvizu  MR number: [id number]  Account number: [de-identified]  : 1940  Age: 65 years  Gender: Female  Status: Inpatient  Location: Bedside  Height: 57 in  Weight: 224 lb  BP: 139/ 60 mmHg    Indications: Dyspnea, shortness of breath    Diagnoses: R06 00 - Dyspnea, unspecified    Sonographer:  Usha Alvarado [facility]  Interpreting Physician:  Talmage Schlatter, MD  Referring Physician:  Wanda Guillermo PA-C  Group:  [facility]    SUMMARY    LEFT VENTRICLE:  Systolic function was normal  Ejection fraction was estimated in the range of 55 % to 65 %  There were no regional wall motion abnormalities  There was mild concentric hypertrophy  Doppler parameters were consistent with abnormal left ventricular relaxation (grade 1 diastolic dysfunction)  LEFT ATRIUM:  The atrium was mildly dilated      MITRAL VALVE:  There was mild annular calcification  AORTIC VALVE:  The valve was not visualized well enough to rule out a bicuspid morphology  Leaflets exhibited marked calcification and markedly reduced cuspal separation  Transaortic velocity was increased due to valvular stenosis  There was moderate to severe stenosis  There was mild regurgitation  TRICUSPID VALVE:  There was mild regurgitation  Estimated peak PA pressure was 40 mmHg  HISTORY: PRIOR HISTORY: Risk factors: CAD, hypertension, hypercholesterolemia, JANICE and morbid obesity  PROCEDURE: The procedure was performed at the bedside  This was a routine study  The transthoracic approach was used  The study included complete 2D imaging, M-mode, complete spectral Doppler, and color Doppler  The heart rate was 66 bpm,  at the start of the study  Images were obtained from the parasternal, apical, subcostal, and suprasternal notch acoustic windows  Echocardiographic views were limited due to decreased penetration and lung interference  Image quality was  adequate  LEFT VENTRICLE: Size was normal  Systolic function was normal  Ejection fraction was estimated in the range of 55 % to 65 %  There were no regional wall motion abnormalities  There was mild concentric hypertrophy  DOPPLER: Doppler  parameters were consistent with abnormal left ventricular relaxation (grade 1 diastolic dysfunction)  RIGHT VENTRICLE: The size was normal  Systolic function was normal  Wall thickness was normal     LEFT ATRIUM: The atrium was mildly dilated  RIGHT ATRIUM: Size was normal     MITRAL VALVE: There was mild annular calcification  Valve structure was normal  There was normal leaflet separation  DOPPLER: The transmitral velocity was within the normal range  There was no evidence for stenosis  There was no  regurgitation  AORTIC VALVE: The valve was not visualized well enough to rule out a bicuspid morphology  Leaflets exhibited marked calcification and markedly reduced cuspal separation  DOPPLER: Transaortic velocity was increased due to valvular stenosis  There was moderate to severe stenosis  There was mild regurgitation  TRICUSPID VALVE: The valve structure was normal  There was normal leaflet separation  DOPPLER: The transtricuspid velocity was within the normal range  There was no evidence for stenosis  There was mild regurgitation  Estimated peak PA  pressure was 40 mmHg  PULMONIC VALVE: Leaflets exhibited normal thickness, no calcification, and normal cuspal separation  DOPPLER: The transpulmonic velocity was within the normal range  There was no regurgitation  PERICARDIUM: There was no pericardial effusion  The pericardium was normal in appearance  AORTA: The root exhibited normal size  SYSTEMIC VEINS: IVC: The inferior vena cava was normal in size and course  Respirophasic changes were normal     SYSTEM MEASUREMENT TABLES    2D  %FS: 36 9 %  Ao Diam: 3 2 cm  EDV(Teich): 89 2 ml  EF(Teich): 67 %  ESV(Teich): 29 5 ml  IVSd: 1 2 cm  LA Area: 25 9 cm2  LA Diam: 4 1 cm  LVEDV MOD A4C: 83 7 ml  LVEF MOD A4C: 83 2 %  LVESV MOD A4C: 14 ml  LVIDd: 4 4 cm  LVIDs: 2 8 cm  LVLd A4C: 8 2 cm  LVLs A4C: 5 9 cm  LVOT Diam: 2 1 cm  LVPWd: 1 2 cm  RA Area: 18 4 cm2  RVIDd: 4 4 cm  SV MOD A4C: 69 6 ml  SV(Teich): 59 7 ml    CW  AR Dec Drew: 2 4 m/s2  AR Dec Time: 1508 3 ms  AR PHT: 437 4 ms  AR Vmax: 3 6 m/s  AR maxP 4 mmHg  AV Env  Ti: 296 9 ms  AV VTI: 90 6 cm  AV Vmax: 4 1 m/s  AV Vmean: 3 1 m/s  AV maxP 1 mmHg  AV meanP 1 mmHg  TR Vmax: 3 3 m/s  TR maxP 3 mmHg    MM  TAPSE: 2 4 cm    PW  VASU (VTI): 1 3 cm2  VASU Vmax: 1 cm2  E': 0 1 m/s  E/E': 18  LVOT Env  Ti: 388 2 ms  LVOT VTI: 34 7 cm  LVOT Vmax: 1 3 m/s  LVOT Vmean: 0 9 m/s  LVOT maxP 6 mmHg  LVOT meanPG: 3 8 mmHg  LVSV Dopp: 114 6 ml  MV A Jose: 1 4 m/s  MV Dec Drew: 1 8 m/s2  MV DecT: 541 5 ms  MV E Jose: 1 m/s  MV E/A Ratio: 0 7  MV PHT: 157 ms  MVA By PHT: 1 4 cm2    [employer] Accredited Echocardiography Laboratory    Prepared and electronically signed by    Katarina Real MD  Signed 14-Aug-2018 18:35:14         Medications:    Current Outpatient Prescriptions:     aspirin (ECOTRIN LOW STRENGTH) 81 mg EC tablet, Take 1 tablet (81 mg total) by mouth daily, Disp: 100 tablet, Rfl: 0    b complex vitamins capsule, Take 1 capsule by mouth 2 (two) times a day  , Disp: , Rfl:     Calcium Carb-Cholecalciferol (CALCIUM 600 + D PO), Take 1 tablet by mouth 2 (two) times a day, Disp: , Rfl:     clopidogrel (PLAVIX) 75 mg tablet, Take 1 tablet (75 mg total) by mouth daily, Disp: 30 tablet, Rfl: 2    Fesoterodine Fumarate ER (TOVIAZ) 8 MG TB24, Take 8 mg by mouth daily, Disp: , Rfl:     fluticasone (FLONASE) 50 mcg/act nasal spray, SPRAY 2 SPRAYS INTO EACH NOSTRIL EVERY DAY, Disp: , Rfl: 0    levothyroxine 50 mcg tablet, Take 50 mcg by mouth daily, Disp: , Rfl:     loratadine (CLARITIN) 10 mg tablet, Take 10 mg by mouth daily, Disp: , Rfl:     metoprolol succinate (TOPROL-XL) 25 mg 24 hr tablet, Take 1 tablet (25 mg total) by mouth daily, Disp: 30 tablet, Rfl: 2    potassium chloride (K-DUR,KLOR-CON) 20 mEq tablet, Take 1 tablet (20 mEq total) by mouth daily, Disp: 30 tablet, Rfl: 1    sertraline (ZOLOFT) 50 mg tablet, Take 50 mg by mouth daily, Disp: , Rfl:     simvastatin (ZOCOR) 40 mg tablet, Take 1 tablet (40 mg total) by mouth daily at bedtime, Disp: 30 tablet, Rfl: 1    temazepam (RESTORIL) 15 mg capsule, Take 15 mg by mouth daily, Disp: , Rfl: 1    acetaminophen (TYLENOL) 325 mg tablet, Take 2 tablets (650 mg total) by mouth every 6 (six) hours as needed for mild pain or headaches (Patient not taking: Reported on 12/6/2018 ), Disp: 30 tablet, Rfl: 0    albuterol (PROVENTIL HFA,VENTOLIN HFA) 90 mcg/act inhaler, Inhale 2 puffs every 6 (six) hours as needed for wheezing, Disp: , Rfl:       Allergies:   Allergies   Allergen Reactions    Latex Rash    Neosporin [Neomycin-Bacitracin Zn-Polymyx] Rash and Other (See Comments)     hives per [facility] order         Assessment and Plan:  1  Shortness of breath   likely multifactorial from anemia, severe AS now S/P TAVR, morbid obesity, pulmonary hypertension  2  Severe aortic stenosis S/P TAVR   continue aspirin  Plavix is for 90 days  Needs antibiotic prophylaxis prior to dental work    3  Chronic diastolic (congestive) heart failure (HCC)   low-salt diet  Daily weight  On prn furosemide and K  Cont beta-blocker  Will add ARB / ACEI later    4  Mitral annular calcification, Non-rheumatic mitral valve stenosis   follow up with serial echocardiograms    5  Essential hypertension   BP stable  Cont beta-blocker    6  Mixed hyperlipidemia   continue statin and diet control  Her PCP closely monitors her blood work    7  LVH (left ventricular hypertrophy)   tight BP control    8  Pulmonary hypertension (HCC)   likely from JANICE, MS, AS    9  Morbid obesity ([address] )   unable to lose weight    10  JANICE (obstructive sleep apnea)   regular CPAP use promoted    -   Patient to discuss with pulmonologist if she still needs her oxygen    Recommend aggressive risk factor modification and therapeutic lifestyle changes  Low-salt, low-calorie, low-fat, low-cholesterol diet with regular exercise and to optimize weight  I will defer the ordering and monitoring of necessity lab studies to you, but I am available and happy to review and manage any of the data at your request in the future  Discussed concepts of atherosclerosis, including signs and symptoms of cardiac disease  Previous studies were reviewed  Safety measures were reviewed  Questions were entertained and answered  Patient was advised to report any problems requiring medical attention  Follow-up with PCP and appropriate specialist and lab work as discussed  Return for follow up visit as scheduled or earlier, if needed  Thank you for allowing me to participate in the care and evaluation of your patient  Should you have any questions, please feel free to contact me        Lyndsay Case MD  12/20/2018,2:24 PM

## 2018-12-24 ENCOUNTER — TELEPHONE (OUTPATIENT)
Dept: FAMILY MEDICINE CLINIC | Facility: CLINIC | Age: 78
End: 2018-12-24

## 2018-12-24 NOTE — TELEPHONE ENCOUNTER
Pt was given Sertraline 50 mg by a doc in [address]  Do you want her to continue ?   If so she needs a refill

## 2018-12-26 DIAGNOSIS — F41.8 DEPRESSION WITH ANXIETY: Primary | ICD-10-CM

## 2019-01-03 ENCOUNTER — APPOINTMENT (OUTPATIENT)
Dept: LAB | Facility: HOSPITAL | Age: 79
End: 2019-01-03
Payer: MEDICARE

## 2019-01-03 DIAGNOSIS — R53.83 OTHER FATIGUE: ICD-10-CM

## 2019-01-03 DIAGNOSIS — R06.00 DYSPNEA ON EXERTION: ICD-10-CM

## 2019-01-03 DIAGNOSIS — D64.9 ANEMIA, UNSPECIFIED TYPE: ICD-10-CM

## 2019-01-03 DIAGNOSIS — E03.9 HYPOTHYROIDISM, UNSPECIFIED TYPE: ICD-10-CM

## 2019-01-03 LAB
ALBUMIN SERPL BCP-MCNC: 3.4 G/DL (ref 3.5–5)
ALP SERPL-CCNC: 84 U/L (ref 46–116)
ALT SERPL W P-5'-P-CCNC: 22 U/L (ref 12–78)
ANION GAP SERPL CALCULATED.3IONS-SCNC: 8 MMOL/L (ref 4–13)
AST SERPL W P-5'-P-CCNC: 27 U/L (ref 5–45)
BACTERIA UR QL AUTO: ABNORMAL /HPF
BASOPHILS # BLD AUTO: 0.07 THOUSANDS/ΜL (ref 0–0.1)
BASOPHILS NFR BLD AUTO: 1 % (ref 0–1)
BILIRUB SERPL-MCNC: 0.1 MG/DL (ref 0.2–1)
BILIRUB UR QL STRIP: NEGATIVE
BUN SERPL-MCNC: 22 MG/DL (ref 5–25)
CALCIUM SERPL-MCNC: 9.7 MG/DL (ref 8.3–10.1)
CHLORIDE SERPL-SCNC: 102 MMOL/L (ref 100–108)
CLARITY UR: CLEAR
CO2 SERPL-SCNC: 28 MMOL/L (ref 21–32)
COLOR UR: YELLOW
CREAT SERPL-MCNC: 0.93 MG/DL (ref 0.6–1.3)
CREAT UR-MCNC: 104 MG/DL
EOSINOPHIL # BLD AUTO: 0.58 THOUSAND/ΜL (ref 0–0.61)
EOSINOPHIL NFR BLD AUTO: 8 % (ref 0–6)
ERYTHROCYTE [DISTWIDTH] IN BLOOD BY AUTOMATED COUNT: 18.5 % (ref 11.6–15.1)
GFR SERPL CREATININE-BSD FRML MDRD: 59 ML/MIN/1.73SQ M
GLUCOSE SERPL-MCNC: 99 MG/DL (ref 65–140)
GLUCOSE UR STRIP-MCNC: NEGATIVE MG/DL
HCT VFR BLD AUTO: 38.5 % (ref 34.8–46.1)
HGB BLD-MCNC: 11.4 G/DL (ref 11.5–15.4)
HGB UR QL STRIP.AUTO: NEGATIVE
HYALINE CASTS #/AREA URNS LPF: ABNORMAL /LPF
IMM GRANULOCYTES # BLD AUTO: 0.02 THOUSAND/UL (ref 0–0.2)
IMM GRANULOCYTES NFR BLD AUTO: 0 % (ref 0–2)
KETONES UR STRIP-MCNC: NEGATIVE MG/DL
LEUKOCYTE ESTERASE UR QL STRIP: ABNORMAL
LYMPHOCYTES # BLD AUTO: 1.12 THOUSANDS/ΜL (ref 0.6–4.47)
LYMPHOCYTES NFR BLD AUTO: 16 % (ref 14–44)
MCH RBC QN AUTO: 25.1 PG (ref 26.8–34.3)
MCHC RBC AUTO-ENTMCNC: 29.6 G/DL (ref 31.4–37.4)
MCV RBC AUTO: 85 FL (ref 82–98)
MICROALBUMIN UR-MCNC: 33.5 MG/L (ref 0–20)
MICROALBUMIN/CREAT 24H UR: 32 MG/G CREATININE (ref 0–30)
MONOCYTES # BLD AUTO: 0.66 THOUSAND/ΜL (ref 0.17–1.22)
MONOCYTES NFR BLD AUTO: 9 % (ref 4–12)
NEUTROPHILS # BLD AUTO: 4.64 THOUSANDS/ΜL (ref 1.85–7.62)
NEUTS SEG NFR BLD AUTO: 66 % (ref 43–75)
NITRITE UR QL STRIP: NEGATIVE
NON-SQ EPI CELLS URNS QL MICRO: ABNORMAL /HPF
NRBC BLD AUTO-RTO: 0 /100 WBCS
PH UR STRIP.AUTO: 6 [PH] (ref 4.5–8)
PLATELET # BLD AUTO: 359 THOUSANDS/UL (ref 149–390)
PMV BLD AUTO: 9.6 FL (ref 8.9–12.7)
POTASSIUM SERPL-SCNC: 4.8 MMOL/L (ref 3.5–5.3)
PROT SERPL-MCNC: 7.8 G/DL (ref 6.4–8.2)
PROT UR STRIP-MCNC: NEGATIVE MG/DL
RBC # BLD AUTO: 4.54 MILLION/UL (ref 3.81–5.12)
RBC #/AREA URNS AUTO: ABNORMAL /HPF
SODIUM SERPL-SCNC: 138 MMOL/L (ref 136–145)
SP GR UR STRIP.AUTO: 1.02 (ref 1–1.03)
TSH SERPL DL<=0.05 MIU/L-ACNC: 1.79 UIU/ML (ref 0.36–3.74)
UROBILINOGEN UR QL STRIP.AUTO: 0.2 E.U./DL
WBC # BLD AUTO: 7.09 THOUSAND/UL (ref 4.31–10.16)
WBC #/AREA URNS AUTO: ABNORMAL /HPF

## 2019-01-03 PROCEDURE — 82746 ASSAY OF FOLIC ACID SERUM: CPT

## 2019-01-03 PROCEDURE — 80053 COMPREHEN METABOLIC PANEL: CPT

## 2019-01-03 PROCEDURE — 81001 URINALYSIS AUTO W/SCOPE: CPT | Performed by: FAMILY MEDICINE

## 2019-01-03 PROCEDURE — 83540 ASSAY OF IRON: CPT

## 2019-01-03 PROCEDURE — 81364 HBB FULL GENE SEQUENCE: CPT

## 2019-01-03 PROCEDURE — 82043 UR ALBUMIN QUANTITATIVE: CPT | Performed by: FAMILY MEDICINE

## 2019-01-03 PROCEDURE — 82570 ASSAY OF URINE CREATININE: CPT | Performed by: FAMILY MEDICINE

## 2019-01-03 PROCEDURE — 84443 ASSAY THYROID STIM HORMONE: CPT

## 2019-01-03 PROCEDURE — 83550 IRON BINDING TEST: CPT

## 2019-01-03 PROCEDURE — 85025 COMPLETE CBC W/AUTO DIFF WBC: CPT

## 2019-01-03 PROCEDURE — 36415 COLL VENOUS BLD VENIPUNCTURE: CPT

## 2019-01-03 PROCEDURE — 83020 HEMOGLOBIN ELECTROPHORESIS: CPT

## 2019-01-03 PROCEDURE — 82728 ASSAY OF FERRITIN: CPT

## 2019-01-03 PROCEDURE — 82607 VITAMIN B-12: CPT

## 2019-01-04 LAB
FERRITIN SERPL-MCNC: 28 NG/ML (ref 8–388)
FOLATE SERPL-MCNC: >20 NG/ML (ref 3.1–17.5)
IRON SATN MFR SERPL: 17 %
IRON SERPL-MCNC: 59 UG/DL (ref 50–170)
TIBC SERPL-MCNC: 343 UG/DL (ref 250–450)
VIT B12 SERPL-MCNC: 838 PG/ML (ref 100–900)

## 2019-01-08 LAB
DEPRECATED HGB OTHER BLD-IMP: 0 %
HGB A MFR BLD: 1.8 % (ref 1.8–3.2)
HGB A MFR BLD: 98.2 % (ref 96.4–98.8)
HGB C MFR BLD: 0 %
HGB F MFR BLD: 0 % (ref 0–2)
HGB FRACT BLD-IMP: NORMAL
HGB S BLD QL SOLY: NEGATIVE
HGB S MFR BLD: 0 %

## 2019-01-10 DIAGNOSIS — R06.00 DYSPNEA, UNSPECIFIED TYPE: Primary | ICD-10-CM

## 2019-01-14 ENCOUNTER — APPOINTMENT (OUTPATIENT)
Dept: LAB | Facility: HOSPITAL | Age: 79
End: 2019-01-14
Payer: MEDICARE

## 2019-01-14 ENCOUNTER — OFFICE VISIT (OUTPATIENT)
Dept: FAMILY MEDICINE CLINIC | Facility: CLINIC | Age: 79
End: 2019-01-14
Payer: MEDICARE

## 2019-01-14 VITALS
SYSTOLIC BLOOD PRESSURE: 138 MMHG | HEIGHT: 55 IN | RESPIRATION RATE: 18 BRPM | HEART RATE: 74 BPM | OXYGEN SATURATION: 94 % | TEMPERATURE: 98.2 F | WEIGHT: 224 LBS | BODY MASS INDEX: 51.84 KG/M2 | DIASTOLIC BLOOD PRESSURE: 70 MMHG

## 2019-01-14 DIAGNOSIS — G47.33 OSA (OBSTRUCTIVE SLEEP APNEA): Primary | ICD-10-CM

## 2019-01-14 DIAGNOSIS — R06.02 SHORTNESS OF BREATH: ICD-10-CM

## 2019-01-14 DIAGNOSIS — I10 ESSENTIAL HYPERTENSION: Chronic | ICD-10-CM

## 2019-01-14 DIAGNOSIS — E66.01 MORBID OBESITY WITH BODY MASS INDEX OF 50.0-59.9 IN ADULT (HCC): ICD-10-CM

## 2019-01-14 LAB
ANION GAP SERPL CALCULATED.3IONS-SCNC: 5 MMOL/L (ref 4–13)
BUN SERPL-MCNC: 22 MG/DL (ref 5–25)
CALCIUM SERPL-MCNC: 9.5 MG/DL (ref 8.3–10.1)
CHLORIDE SERPL-SCNC: 103 MMOL/L (ref 100–108)
CO2 SERPL-SCNC: 33 MMOL/L (ref 21–32)
CREAT SERPL-MCNC: 0.96 MG/DL (ref 0.6–1.3)
DEPRECATED D DIMER PPP: 802 NG/ML (FEU)
GFR SERPL CREATININE-BSD FRML MDRD: 56 ML/MIN/1.73SQ M
GLUCOSE SERPL-MCNC: 88 MG/DL (ref 65–140)
POTASSIUM SERPL-SCNC: 4.4 MMOL/L (ref 3.5–5.3)
SODIUM SERPL-SCNC: 141 MMOL/L (ref 136–145)

## 2019-01-14 PROCEDURE — 36415 COLL VENOUS BLD VENIPUNCTURE: CPT

## 2019-01-14 PROCEDURE — 80048 BASIC METABOLIC PNL TOTAL CA: CPT

## 2019-01-14 PROCEDURE — 99214 OFFICE O/P EST MOD 30 MIN: CPT | Performed by: FAMILY MEDICINE

## 2019-01-14 PROCEDURE — 85379 FIBRIN DEGRADATION QUANT: CPT

## 2019-01-14 RX ORDER — OMEPRAZOLE 40 MG/1
CAPSULE, DELAYED RELEASE ORAL
Refills: 1 | COMMUNITY
Start: 2018-12-14 | End: 2019-07-01 | Stop reason: SDUPTHER

## 2019-01-14 NOTE — PATIENT INSTRUCTIONS
Discussed all with patient  After discussing the case with Dr Garret Pascal from [facility] - [place] will get repeat CTA of the chest to rule out PE but low probability of this  Will also repeat the D-dimer as the last D-dimer was elevated over 1200  Will refer back to Dr Alejandro Soliz for evaluation  Try to work on the weight by cutting back on calories, avoiding carbs and drinking more water

## 2019-01-14 NOTE — PROGRESS NOTES
Assessment/Plan:     Chronic Problems:  JANICE (obstructive sleep apnea)  Pt is currently using cpap at home with oxygen  Hypertension  BP is slightly elevated today  No added salt in the diet  Visit Diagnosis:  Diagnoses and all orders for this visit:    JANICE (obstructive sleep apnea)    Essential hypertension    Persistent shortness of breath s/p tavr  Comments:  ? [place]? Will get cta chest as per Dr Sandy Kent, but will refer back to Dr Ashok Leonardo for f/u  Orders:  -     CTA chest pe study; Future  -     Basic metabolic panel; Future  -     Ambulatory referral to Pulmonology; Future  -     D-dimer, quantitative; Future    Morbid obesity with body mass index of 50 0-59 9 in [facility])  Comments:  Needs to try to work on weight loss  Not a candidate for bypass/     Other orders  -     omeprazole (PriLOSEC) 40 MG capsule; TAKE ONE CAPSULE BY MOUTH TWICE A DAY          Subjective:    Patient ID: Joni Akbar is a 77 y o  female  Patient is here for follow-up appointment on her shortness of breath  She noticed that her shortness of breath was worse for at least months prior to her heart surgery  Patient had tavr on October the 9th and still no improvement in her breathing  Was noted to have anemia with heme positive stool and had egd, pill endoscopy and colonscopy with no source of bleeding  Was seen by Dr Jillian Perez and told she did not need further w/u per pt  Was seen by Dr Sandy Kent but by that time hgb increased from 8 to 11 4 with iron supplementation,  but still no change in sob  Dr Sandy Kent felt pe should be r/o  Pt had f/u with Dr Deuce Draper and found to have ef of 65% with improvement of pulmonary hypertension after tavr  Seen by pulmonary and advised oxygen  Currently on 2 liters n/c  Pt feels the oxygen is no benefit  Pt had difficulty at cardiac rehab and was told her 02 level dropped and needed oxygen  Takes all other meds as directed  No side effects noted           The following portions of the patient's history were reviewed and updated as appropriate: allergies, current medications, past family history, past medical history, past social history, past surgical history and problem list     Review of Systems   Constitutional: Positive for fatigue  Negative for chills, diaphoresis and fever  HENT: Negative  Thinks her ear is finally better  Eyes: Negative  Respiratory: Positive for shortness of breath  Negative for cough and wheezing  Cardiovascular: Negative for chest pain, palpitations and leg swelling  Gastrointestinal: Negative  Genitourinary: Negative  Neurological: Negative for dizziness, light-headedness and headaches  /70   Pulse 74   Temp 98 2 °F (36 8 °C)   Resp 18   Ht 4' 7" (1 397 m)   Wt 102 kg (224 lb)   SpO2 94% Comment: went up to 95%  BMI 52 06 kg/m²   Social History     Social History    Marital status: Single     Spouse name: N/A    Number of children: N/A    Years of education: N/A     Occupational History    Not on file       Social History Main Topics    Smoking status: Never Smoker    Smokeless tobacco: Never Used    Alcohol use No    Drug use: No    Sexual activity: No     Other Topics Concern    Not on file     Social History Narrative    Denied drinking coffee    Denied exercise habits         Past Medical History:   Diagnosis Date    Anemia 08/22/2018    Arthritis     AVB (atrioventricular block)     first degree    CHF (congestive heart failure) (HCC)     COPD, mild (HCC)     Coronary artery disease     Dislocation of right shoulder joint     Frequent UTI     GERD (gastroesophageal reflux disease)     H/O: pneumonia     Heme positive stool     History of uterine cancer     s/p CALLIE    Hyperlipidemia     Hypertension     Hypothyroidism     Obesity, morbid ([facility] [address] ) 08/22/2018    JANICE on CPAP     Pulmonary hypertension ([facility] ) 08/22/2018    Severe aortic stenosis     Simple goiter     Skin cyst     within the armpits, right Family History   Problem Relation Age of Onset    Diabetes Mother     Stroke Mother     Cancer Father     Lung cancer Father     Diabetes Sister     Heart disease Sister     Coronary artery disease Family     Diabetes Family     Hypertension Family     Cancer Family     Stroke Family      Past Surgical History:   Procedure Laterality Date    BREAST BIOPSY      CARDIAC CATHETERIZATION      CARPAL TUNNEL RELEASE Bilateral     CHOLECYSTECTOMY      DILATION AND CURETTAGE OF UTERUS      HYSTEROSCOPY      IA COLONOSCOPY FLX DX W/COLLJ SPEC WHEN PFRMD N/A 9/6/2018    Procedure: COLONOSCOPY;  Surgeon: Amor Acosta MD;  Location: [facility]; Service: Gastroenterology    IA ECHO TRANSESOPHAG R-T 2D W/PRB IMG ACQUISJ I&R N/A 10/9/2018    Procedure: INTRA-OP TRANSESOPHAGEAL ECHOCARDIOGRAM (GARRISON); Surgeon: Jason Davila DO;  [facility];  Service: Cardiac Surgery    IA ESOPHAGOGASTRODUODENOSCOPY TRANSORAL DIAGNOSTIC N/A 8/31/2018    Procedure: ESOPHAGOGASTRODUODENOSCOPY (EGD); Surgeon: Amor Acosta MD;  [facility]; Service: Gastroenterology    IA REPLACE AORTIC VALVE OPENFEMORAL ARTERY APPROACH N/A 10/9/2018    Procedure: REPLACEMENT AORTIC VALVE TRANSCATHETER (TAVR) TRANSFEMORAL W/ 23 MM MENDOZA NOE S3 VALVE (ACCESS OF LEFT);   Surgeon: Jason Davila DO;  [facility];  Service: Cardiac Surgery    TOTAL ABDOMINAL HYSTERECTOMY      TOTAL HIP ARTHROPLASTY Left 2007    TOTAL KNEE ARTHROPLASTY Bilateral        Current Outpatient Prescriptions:     albuterol (PROVENTIL HFA,VENTOLIN HFA) 90 mcg/act inhaler, Inhale 2 puffs every 6 (six) hours as needed for wheezing, Disp: , Rfl:     aspirin (ECOTRIN LOW STRENGTH) 81 mg EC tablet, Take 1 tablet (81 mg total) by mouth daily, Disp: 100 tablet, Rfl: 0    b complex vitamins capsule, Take 1 capsule by mouth 2 (two) times a day  , Disp: , Rfl:     Calcium Carb-Cholecalciferol (CALCIUM 600 + D PO), Take 1 tablet by mouth 2 (two) times a day, Disp: , Rfl:     Fesoterodine Fumarate ER (TOVIAZ) 8 MG TB24, Take 8 mg by mouth daily, Disp: , Rfl:     fluticasone (FLONASE) 50 mcg/act nasal spray, SPRAY 2 SPRAYS INTO EACH NOSTRIL EVERY DAY, Disp: , Rfl: 0    levothyroxine 50 mcg tablet, Take 50 mcg by mouth daily, Disp: , Rfl:     loratadine (CLARITIN) 10 mg tablet, Take 10 mg by mouth daily, Disp: , Rfl:     metoprolol succinate (TOPROL-XL) 25 mg 24 hr tablet, Take 1 tablet (25 mg total) by mouth daily, Disp: 30 tablet, Rfl: 2    omeprazole (PriLOSEC) 40 MG capsule, TAKE ONE CAPSULE BY MOUTH TWICE A DAY, Disp: , Rfl: 1    sertraline (ZOLOFT) 50 mg tablet, Take 1 tablet (50 mg total) by mouth daily, Disp: 30 tablet, Rfl: 3    simvastatin (ZOCOR) 40 mg tablet, Take 1 tablet (40 mg total) by mouth daily at bedtime, Disp: 30 tablet, Rfl: 1    temazepam (RESTORIL) 15 mg capsule, Take 15 mg by mouth daily, Disp: , Rfl: 1    Allergies   Allergen Reactions    Latex Rash    Neosporin [Neomycin-Bacitracin Zn-Polymyx] Rash and Other (See Comments)     hives per [facility] order          Lab Review   Appointment on 01/03/2019   Component Date Value    WBC 01/03/2019 7 09     RBC 01/03/2019 4 54     Hemoglobin 01/03/2019 11 4*    Hematocrit 01/03/2019 38 5     MCV 01/03/2019 85     MCH 01/03/2019 25 1*    MCHC 01/03/2019 29 6*    RDW 01/03/2019 18 5*    MPV 01/03/2019 9 6     Platelets 07/48/7510 359     nRBC 01/03/2019 0     Neutrophils Relative 01/03/2019 66     Immat GRANS % 01/03/2019 0     Lymphocytes Relative 01/03/2019 16     Monocytes Relative 01/03/2019 9     Eosinophils Relative 01/03/2019 8*    Basophils Relative 01/03/2019 1     Neutrophils Absolute 01/03/2019 4 64     Immature Grans Absolute 01/03/2019 0 02     Lymphocytes Absolute 01/03/2019 1 12     Monocytes Absolute 01/03/2019 0 66     Eosinophils Absolute 01/03/2019 0 58     Basophils Absolute 01/03/2019 0 07     Sodium 01/03/2019 138  Potassium 01/03/2019 4 8     Chloride 01/03/2019 102     CO2 01/03/2019 28     ANION GAP 01/03/2019 8     BUN 01/03/2019 22     Creatinine 01/03/2019 0 93     Glucose 01/03/2019 99     Calcium 01/03/2019 9 7     AST 01/03/2019 27     ALT 01/03/2019 22     Alkaline Phosphatase 01/03/2019 84     Total Protein 01/03/2019 7 8     Albumin 01/03/2019 3 4*    Total Bilirubin 01/03/2019 0 10*    eGFR 01/03/2019 59     TSH 3RD GENERATON 01/03/2019 1 791     Vitamin B-12 01/03/2019 838     Folate 01/03/2019 >20 0*    Hgb A2 Quant 01/03/2019 1 8     Hgb C Quant 01/03/2019 0 0     Hgb F Quant 01/03/2019 0 0     Hgb S Quant 01/03/2019 0 0     Hgb Variant 01/03/2019 0 0     Hemoglobin Solubility 01/03/2019 Negative     Hgb Interp  01/03/2019 Comment     Hgb A 01/03/2019 98 2     Iron Saturation 01/03/2019 17     TIBC 01/03/2019 343     Iron 01/03/2019 59     Ferritin 01/03/2019 28    Office Visit on 12/14/2018   Component Date Value    Clarity, UA 01/03/2019 Clear     Color, UA 01/03/2019 Yellow     Specific Gravity, UA 01/03/2019 1 020     pH, UA 01/03/2019 6 0     Glucose, UA 01/03/2019 Negative     Ketones, UA 01/03/2019 Negative     Blood, UA 01/03/2019 Negative     Protein, UA 01/03/2019 Negative     Nitrite, UA 01/03/2019 Negative     Bilirubin, UA 01/03/2019 Negative     Urobilinogen, UA 01/03/2019 0 2     Leukocytes, UA 01/03/2019 Trace*    WBC, UA 01/03/2019 2-4*    RBC, UA 01/03/2019 0-1*    Hyaline Casts, UA 01/03/2019 0-1*    Bacteria, UA 01/03/2019 Occasional     Epithelial Cells 01/03/2019 Occasional     Creatinine, Ur 01/03/2019 104 0     Microalbum  ,U,Random 01/03/2019 33 5*    Microalb Creat Ratio 01/03/2019 32*   Appointment on 11/30/2018   Component Date Value    NT-proBNP 11/30/2018 110     D-Dimer, Quant 11/30/2018 1201*    PTH 11/30/2018 71 3     Sodium 11/30/2018 141     Potassium 11/30/2018 4 3     Chloride 11/30/2018 103     CO2 11/30/2018 29  ANION GAP 11/30/2018 9     BUN 11/30/2018 22     Creatinine 11/30/2018 0 84     Glucose, Fasting 11/30/2018 92     Calcium 11/30/2018 9 5     AST 11/30/2018 19     ALT 11/30/2018 20     Alkaline Phosphatase 11/30/2018 88     Total Protein 11/30/2018 7 6     Albumin 11/30/2018 3 4*    Total Bilirubin 11/30/2018 0 20     eGFR 11/30/2018 67     Iron 11/30/2018 20*    Ferritin 11/30/2018 21     TIBC 11/30/2018 380         Imaging: No results found  Objective:     Physical Exam   Constitutional: She is oriented to person, place, and time  She appears well-developed and well-nourished  No distress  HENT:   Head: Normocephalic and atraumatic  Right Ear: External ear normal    Left Ear: External ear normal    Mouth/Throat: Oropharynx is clear and moist    Eyes: Pupils are equal, round, and reactive to light  Conjunctivae and EOM are normal  Right eye exhibits no discharge  Left eye exhibits no discharge  Neck: Normal range of motion  Neck supple  Cardiovascular: Normal rate, regular rhythm and normal heart sounds  Exam reveals no friction rub  No murmur heard  Lungs with bbc crackes  Pulmonary/Chest: Effort normal  No respiratory distress  She has no wheezes  She has rales (Pt with bbc )  Abdominal: Soft  Bowel sounds are normal  There is no tenderness  Pt is morbidly obese  Musculoskeletal: Normal range of motion  She exhibits no edema, tenderness or deformity  Lymphadenopathy:     She has no cervical adenopathy  Neurological: She is alert and oriented to person, place, and time  No cranial nerve deficit  Skin: Skin is warm and dry  No rash noted  She is not diaphoretic  Psychiatric: She has a normal mood and affect  Her behavior is normal  Judgment and thought content normal          Patient Instructions   Discussed all with patient    After discussing the case with Dr Adilene Castrejon from [facility] will get repeat CTA of the chest to rule out PE but low probability of this   Will also repeat the D-dimer as the last D-dimer was elevated over 1200  Will refer back to Dr Elva Rodriguez for evaluation  Try to work on the weight by cutting back on calories, avoiding carbs and drinking more water  MABEL Castro    Portions of the record may have been created with voice recognition software   Occasional wrong word or "sound a like" substitutions may have occurred due to the inherent limitations of voice recognition software   Read the chart carefully and recognize, using context, where substitutions have occurred

## 2019-01-15 ENCOUNTER — TELEPHONE (OUTPATIENT)
Dept: FAMILY MEDICINE CLINIC | Facility: CLINIC | Age: 79
End: 2019-01-15

## 2019-01-15 ENCOUNTER — TELEPHONE (OUTPATIENT)
Dept: PULMONOLOGY | Facility: CLINIC | Age: 79
End: 2019-01-15

## 2019-01-15 ENCOUNTER — HOSPITAL ENCOUNTER (OUTPATIENT)
Dept: CT IMAGING | Facility: HOSPITAL | Age: 79
Discharge: HOME | End: 2019-01-15
Payer: MEDICARE

## 2019-01-15 DIAGNOSIS — R06.02 SHORTNESS OF BREATH: ICD-10-CM

## 2019-01-15 PROCEDURE — 71275 CT ANGIOGRAPHY CHEST: CPT

## 2019-01-15 RX ADMIN — IOHEXOL 85 ML: 350 INJECTION, SOLUTION INTRAVENOUS at 15:58

## 2019-01-15 NOTE — TELEPHONE ENCOUNTER
----- Message from [address] [place] [address]  sent at 1/15/2019  5:26 PM EST -----  Please tell [place] of chest shows no pulmonary embolism  Keep f/u appts

## 2019-01-16 NOTE — TELEPHONE ENCOUNTER
Dr Pulido Jorden, I am not sure what else we can do for her as a workup, she has had a lot of testing done

## 2019-01-17 NOTE — TELEPHONE ENCOUNTER
She did have some suspected asthma in the past which may be contributing  Can you ask if she still has any inhalers at home? Can restart her on Breo as well as albuterol inhaler as needed

## 2019-01-18 DIAGNOSIS — J41.0 SIMPLE CHRONIC BRONCHITIS (HCC): Primary | ICD-10-CM

## 2019-01-18 RX ORDER — ALBUTEROL SULFATE 90 UG/1
2 AEROSOL, METERED RESPIRATORY (INHALATION) EVERY 6 HOURS PRN
Qty: 1 INHALER | Refills: 3 | Status: SHIPPED | OUTPATIENT
Start: 2019-01-18 | End: 2020-06-16 | Stop reason: SDUPTHER

## 2019-01-18 NOTE — TELEPHONE ENCOUNTER
Spoke to pt  She is not using any inhalers  She has 1 breo 100/25mcg at home  I asked her to start using the [place] and that we would send a new script for a rescue inhaler and breo to pharmacy  She will call us in a week to ten days and let us know how she feels  She will also let PCP know that she spoke to us and has an appt in march

## 2019-01-19 LAB — HBB GENE MUT ANL BLD/T: NORMAL

## 2019-01-24 DIAGNOSIS — Z29.8 NEED FOR SBE (SUBACUTE BACTERIAL ENDOCARDITIS) PROPHYLAXIS: Primary | ICD-10-CM

## 2019-01-24 DIAGNOSIS — G47.00 INSOMNIA, UNSPECIFIED TYPE: Primary | ICD-10-CM

## 2019-01-24 RX ORDER — TEMAZEPAM 7.5 MG/1
7.5 CAPSULE ORAL
Qty: 90 CAPSULE | Refills: 0 | Status: SHIPPED | OUTPATIENT
Start: 2019-01-24 | End: 2019-05-03 | Stop reason: SDUPTHER

## 2019-01-24 RX ORDER — AMOXICILLIN 500 MG/1
CAPSULE ORAL
Qty: 30 CAPSULE | Refills: 0 | Status: SHIPPED | OUTPATIENT
Start: 2019-01-24 | End: 2019-02-24

## 2019-01-24 NOTE — TELEPHONE ENCOUNTER
Can you confirm with Glendarafael Bry that she still needs the temazepam for sleep, but if she does let her know I would like to give her a smaller dose r/t her breathing issue? Let me know

## 2019-01-28 ENCOUNTER — OFFICE VISIT (OUTPATIENT)
Dept: CARDIOLOGY CLINIC | Facility: CLINIC | Age: 79
End: 2019-01-28
Payer: MEDICARE

## 2019-01-28 VITALS
WEIGHT: 224.6 LBS | HEIGHT: 55 IN | OXYGEN SATURATION: 97 % | SYSTOLIC BLOOD PRESSURE: 122 MMHG | HEART RATE: 65 BPM | BODY MASS INDEX: 51.98 KG/M2 | DIASTOLIC BLOOD PRESSURE: 72 MMHG

## 2019-01-28 DIAGNOSIS — I34.2 NON-RHEUMATIC MITRAL VALVE STENOSIS: ICD-10-CM

## 2019-01-28 DIAGNOSIS — I50.32 CHRONIC DIASTOLIC (CONGESTIVE) HEART FAILURE (HCC): ICD-10-CM

## 2019-01-28 DIAGNOSIS — R06.00 DYSPNEA, UNSPECIFIED TYPE: Primary | ICD-10-CM

## 2019-01-28 DIAGNOSIS — I10 ESSENTIAL HYPERTENSION: ICD-10-CM

## 2019-01-28 DIAGNOSIS — E78.2 MIXED HYPERLIPIDEMIA: ICD-10-CM

## 2019-01-28 DIAGNOSIS — E66.01 MORBID OBESITY (HCC): ICD-10-CM

## 2019-01-28 DIAGNOSIS — I27.20 PULMONARY HYPERTENSION (HCC): ICD-10-CM

## 2019-01-28 DIAGNOSIS — I35.0 SEVERE AORTIC STENOSIS: ICD-10-CM

## 2019-01-28 DIAGNOSIS — Z95.2 S/P TAVR (TRANSCATHETER AORTIC VALVE REPLACEMENT): ICD-10-CM

## 2019-01-28 PROCEDURE — 99215 OFFICE O/P EST HI 40 MIN: CPT | Performed by: INTERNAL MEDICINE

## 2019-01-28 RX ORDER — FLUTICASONE FUROATE AND VILANTEROL 100; 25 UG/1; UG/1
1 POWDER RESPIRATORY (INHALATION) DAILY
COMMUNITY
End: 2019-02-26 | Stop reason: SDUPTHER

## 2019-01-28 RX ORDER — POTASSIUM CHLORIDE 1.5 G/1.77G
20 POWDER, FOR SOLUTION ORAL DAILY
COMMUNITY
End: 2019-04-01

## 2019-01-28 RX ORDER — FUROSEMIDE 20 MG/1
20 TABLET ORAL DAILY
Qty: 90 TABLET | Refills: 1 | Status: SHIPPED | OUTPATIENT
Start: 2019-01-28 | End: 2019-04-20 | Stop reason: SDDI

## 2019-01-28 RX ORDER — FUROSEMIDE 20 MG/1
20 TABLET ORAL DAILY
COMMUNITY
End: 2019-01-28 | Stop reason: SDUPTHER

## 2019-01-28 RX ORDER — POTASSIUM CHLORIDE 750 MG/1
10 TABLET, FILM COATED, EXTENDED RELEASE ORAL 2 TIMES DAILY
Qty: 90 TABLET | Refills: 1 | Status: SHIPPED | OUTPATIENT
Start: 2019-01-28 | End: 2019-04-29

## 2019-01-28 NOTE — PROGRESS NOTES
CARDIOLOGY OFFICE VISIT  [facility]  Albaro Lopez, Chand[place], [address] [place] [address], [place], [facility] Tracy Borden  Tel: [phone]      NAME: Marco Antonio Hartman  AGE: 77 y o  SEX: female  : 1940   MRN: [id number]      Chief Complaint:  Chief Complaint   Patient presents with    Shortness of Breath         History of Present Illness:   Patient comes for follow up  States she still feels SOB with exertion  Hemoglobin has improved  Denies CP, palpitations, lightheadedness, syncope, swelling feet, orthopnea, PND, claudication      Severe aortic stenosis S/P TAVR on 10/9/18 -  On ASA  Chronic diastolic congestive heart failure -  Currently euvolemic  On prn furosemide -> asked to try take it daily and see if it helps her SOB  Cont beta-blocker  Not on ACE-inhibitor/ARB currently  Recently started on home oxygen and needs to address it with her pulmonologist if it is needed     Hypertension, LVH, DD -  Has had it for many years  Takes her medications regularly  Denies lightheadedness, headache, medication side effects        Hyperlipidemia -  Has had it for few years  Takes her medications regularly  Denies myalgia  Her PCP closely monitor the blood work     Mitral stenosis -  Stable  Follow-up with serial echocardiograms    PHTN - from JANICE, valvular disease     Morbid obesity -  Unable to lose weight due to inability to exercise    JANICE - now uses CPAP   She does have history of pulmonary hypertension       Past Medical History:  Past Medical History:   Diagnosis Date    Anemia 2018    Arthritis     AVB (atrioventricular block)     first degree    CHF (congestive heart failure) (HCC)     COPD, mild (HCC)     Coronary artery disease     Dislocation of right shoulder joint     Frequent UTI     GERD (gastroesophageal reflux disease)     H/O: pneumonia     Heme positive stool     History of uterine cancer     s/p CALLIE    Hyperlipidemia     Hypertension     Hypothyroidism     Obesity, morbid ([facility] [address] ) 08/22/2018    JANICE on CPAP     Pulmonary hypertension (HCC) 08/22/2018    Severe aortic stenosis     Simple goiter     Skin cyst     within the armpits, right         Past Surgical History:  Past Surgical History:   Procedure Laterality Date    BREAST BIOPSY      CARDIAC CATHETERIZATION      CARPAL TUNNEL RELEASE Bilateral     CHOLECYSTECTOMY      DILATION AND CURETTAGE OF UTERUS      HYSTEROSCOPY      AR COLONOSCOPY FLX DX W/COLLJ SPEC WHEN PFRMD N/A 9/6/2018    Procedure: COLONOSCOPY;  Surgeon: Shannon Barrientos MD;  Location: [facility]; Service: Gastroenterology    AR ECHO TRANSESOPHAG R-T 2D W/PRB IMG ACQUISJ I&R N/A 10/9/2018    Procedure: INTRA-OP TRANSESOPHAGEAL ECHOCARDIOGRAM (GARRISON); Surgeon: Vicky Alvarez DO;  [facility];  Service: Cardiac Surgery    AR ESOPHAGOGASTRODUODENOSCOPY TRANSORAL DIAGNOSTIC N/A 8/31/2018    Procedure: ESOPHAGOGASTRODUODENOSCOPY (EGD); Surgeon: Shannon Barrientos MD;  [facility]: [facility]; Service: Gastroenterology    AR REPLACE AORTIC VALVE OPENFEMORAL ARTERY APPROACH N/A 10/9/2018    Procedure: REPLACEMENT AORTIC VALVE TRANSCATHETER (TAVR) TRANSFEMORAL W/ 23 MM MENDOZA NOE S3 VALVE (ACCESS OF LEFT);   Surgeon: Vicky Alvarez DO;  [facility];  Service: Cardiac Surgery    TOTAL ABDOMINAL HYSTERECTOMY      TOTAL HIP ARTHROPLASTY Left 2007    TOTAL KNEE ARTHROPLASTY Bilateral          Family History:  Family History   Problem Relation Age of Onset    Diabetes Mother     Stroke Mother     Cancer Father     Lung cancer Father     Diabetes Sister     Heart disease Sister     Coronary artery disease Family     Diabetes Family     Hypertension Family     Cancer Family     Stroke Family          Social History:  Social History     Social History    Marital status: Single     Spouse name: N/A    Number of children: N/A    Years of education: N/A     Social History Main Topics    Smoking status: Never Smoker    Smokeless tobacco: Never Used    Alcohol use No    Drug use: No    Sexual activity: No     Other Topics Concern    None     Social History Narrative    Denied drinking coffee    Denied exercise habits             Active Problems:  Patient Active Problem List   Diagnosis    Hypothyroid    Hypertension    Hyperlipidemia    Reactive airway disease without complication    JANICE (obstructive sleep apnea)    LVH (left ventricular hypertrophy)    Mitral annular calcification    Mitral valve stenosis    Pulmonary hypertension (HCC)    Chronic diastolic (congestive) heart failure (HCC)    Anemia    Obesity, morbid (HCC)    Gastroesophageal reflux disease without esophagitis    Arthritis    AVB (atrioventricular block)    Chronic diastolic congestive heart failure (HCC)    COPD, mild (HCC)    Coronary artery disease    JANICE on CPAP    S/P TAVR (transcatheter aortic valve replacement)    Acute pulmonary insufficiency    Postoperative anemia due to acute blood loss    Encounter for postoperative care         The following portions of the patient's history were reviewed and updated as appropriate: past medical history, past surgical history, past family history,  past social history, current medications, allergies and problem list       Review of Systems:  Constitutional: Denies fever, chills  +fatigue  Eyes: Denies eye redness, eye discharge, double vision  ENT: Denies hearing loss, tinnitus, sneezing, nasal discharge, sore throat   Respiratory: Denies cough, expectoration, hemoptysis   +shortness of breath  Cardiovascular: Denies chest pain, palpitations, orthopnea, PND, lower extremity swelling  Gastrointestinal: Denies abdominal pain, nausea, vomiting, hematemesis, diarrhea, bloody stools  Genito-Urinary: Denies dysuria, incontinence  Musculoskeletal: Denies back pain, joint pain, muscle pain  Neurologic: Denies confusion, lightheadedness, syncope, headache, focal weakness, sensory changes, seizures  Endocrine: Denies polyuria, polydipsia, temperature intolerance  Allergy and Immunology: Denies hives, insect bite sensitivity  Hematological and Lymphatic: Denies bleeding problems, swollen glands   Psychological: Denies depression, suicidal ideation, anxiety, panic  Dermatological: Denies pruritus, rash, skin lesion changes      Vitals:  Vitals:    01/28/19 1326   BP: 122/72   Pulse: 65   SpO2: 97%       Body mass index is 52 2 kg/m²  Weight (last 2 days)     Date/Time   Weight    01/28/19 1326  102 (224 6)                Physical Examination:  General:   Morbidly obese  On O2  Using a walker for support  Patient is not in acute distress  Awake, alert, oriented in time, place and person  Responding to commands  Head: Normocephalic  Atraumatic  Eyes: Both pupils normal sized, round and reactive to light  Nonicteric  ENT: Normal external ear canals  Nares normal, no drainage  Lips and oral mucosa normal  Neck: Supple  JVP not raised  Trachea central  No thyromegaly  Lungs: Bilateral bronchovascular breath sounds with no crackles or rhonchi  Chest wall: No tenderness  Cardiovascular: RRR  [place] prosthetic valve sound+  Gastrointestinal: Abdomen soft, nontender  No guarding or rigidity  Liver and spleen not palpable  Bowel sounds present  Neurologic: Patient is awake, alert, oriented in time, place and person  Responding to command  Moving all extremities  Integumentary:  No skin rash  Lymphatic: No cervical lymphadenopathy  Back: Symmetric   No CVA tenderness  Extremities: No clubbing, cyanosis or edema      Laboratory Results:  CBC with diff:   Lab Results   Component Value Date    WBC 7 09 01/03/2019    RBC 4 54 01/03/2019    HGB 11 4 (L) 01/03/2019    HCT 38 5 01/03/2019    MCV 85 01/03/2019    MCH 25 1 (L) 01/03/2019    RDW 18 5 (H) 01/03/2019     01/03/2019       CMP:  Lab Results   Component Value Date    CREATININE 0 96 01/14/2019    BUN 22 2019    K 4 4 2019     2019    CO2 33 (H) 2019    CO2 32 10/09/2018    GLUCOSE 101 10/09/2018    ALKPHOS 84 2019    ALT 22 2019    AST 27 2019       Lab Results   Component Value Date    HGBA1C 5 7 2018    MG 2 0 2018       Lab Results   Component Value Date    TROPONINI <0 02 2018    TROPONINI <0 02 2018    TROPONINI <0 02 2018       Lipid Profile:   No results found for: CHOL  Lab Results   Component Value Date    HDL 59 08/15/2018    HDL 65 (H) 2018     Lab Results   Component Value Date    LDLCALC 72 08/15/2018    LDLCALC 51 2018     Lab Results   Component Value Date    TRIG 112 08/15/2018    TRIG 83 2018       Cardiac testing:   Results for orders placed during the hospital encounter of 18   Echo complete with contrast if indicated    Narrative [place] [address] [place] [address] 89  [phone]    Transthoracic Echocardiogram  2D, M-mode, Doppler, and Color Doppler    Study date:  14-Aug-2018    Patient: Minh May  MR number: [id number]  Account number: [de-identified]  : 1940  Age: 65 years  Gender: Female  Status: Inpatient  Location: Bedside  Height: 57 in  Weight: 224 lb  BP: 139/ 60 mmHg    Indications: Dyspnea, shortness of breath    Diagnoses: R06 00 - Dyspnea, unspecified    Sonographer:  Audrey Mitchell RDCS  Interpreting Physician:  Nita Chambers MD  Referring Physician:  Martell Retana PA-C  Group:  [facility]    SUMMARY    LEFT VENTRICLE:  Systolic function was normal  Ejection fraction was estimated in the range of 55 % to 65 %  There were no regional wall motion abnormalities  There was mild concentric hypertrophy  Doppler parameters were consistent with abnormal left ventricular relaxation (grade 1 diastolic dysfunction)  LEFT ATRIUM:  The atrium was mildly dilated      MITRAL VALVE:  There was mild annular calcification  AORTIC VALVE:  The valve was not visualized well enough to rule out a bicuspid morphology  Leaflets exhibited marked calcification and markedly reduced cuspal separation  Transaortic velocity was increased due to valvular stenosis  There was moderate to severe stenosis  There was mild regurgitation  TRICUSPID VALVE:  There was mild regurgitation  Estimated peak PA pressure was 40 mmHg  HISTORY: PRIOR HISTORY: Risk factors: CAD, hypertension, hypercholesterolemia, JANICE and morbid obesity  PROCEDURE: The procedure was performed at the bedside  This was a routine study  The transthoracic approach was used  The study included complete 2D imaging, M-mode, complete spectral Doppler, and color Doppler  The heart rate was 66 bpm,  at the start of the study  Images were obtained from the parasternal, apical, subcostal, and suprasternal notch acoustic windows  Echocardiographic views were limited due to decreased penetration and lung interference  Image quality was  adequate  LEFT VENTRICLE: Size was normal  Systolic function was normal  Ejection fraction was estimated in the range of 55 % to 65 %  There were no regional wall motion abnormalities  There was mild concentric hypertrophy  DOPPLER: Doppler  parameters were consistent with abnormal left ventricular relaxation (grade 1 diastolic dysfunction)  RIGHT VENTRICLE: The size was normal  Systolic function was normal  Wall thickness was normal     LEFT ATRIUM: The atrium was mildly dilated  RIGHT ATRIUM: Size was normal     MITRAL VALVE: There was mild annular calcification  Valve structure was normal  There was normal leaflet separation  DOPPLER: The transmitral velocity was within the normal range  There was no evidence for stenosis  There was no  regurgitation  AORTIC VALVE: The valve was not visualized well enough to rule out a bicuspid morphology  Leaflets exhibited marked calcification and markedly reduced cuspal separation  DOPPLER: Transaortic velocity was increased due to valvular stenosis  There was moderate to severe stenosis  There was mild regurgitation  TRICUSPID VALVE: The valve structure was normal  There was normal leaflet separation  DOPPLER: The transtricuspid velocity was within the normal range  There was no evidence for stenosis  There was mild regurgitation  Estimated peak PA  pressure was 40 mmHg  PULMONIC VALVE: Leaflets exhibited normal thickness, no calcification, and normal cuspal separation  DOPPLER: The transpulmonic velocity was within the normal range  There was no regurgitation  PERICARDIUM: There was no pericardial effusion  The pericardium was normal in appearance  AORTA: The root exhibited normal size  SYSTEMIC VEINS: IVC: The inferior vena cava was normal in size and course  Respirophasic changes were normal     SYSTEM MEASUREMENT TABLES    2D  %FS: 36 9 %  Ao Diam: 3 2 cm  EDV(Teich): 89 2 ml  EF(Teich): 67 %  ESV(Teich): 29 5 ml  IVSd: 1 2 cm  LA Area: 25 9 cm2  LA Diam: 4 1 cm  LVEDV MOD A4C: 83 7 ml  LVEF MOD A4C: 83 2 %  LVESV MOD A4C: 14 ml  LVIDd: 4 4 cm  LVIDs: 2 8 cm  LVLd A4C: 8 2 cm  LVLs A4C: 5 9 cm  LVOT Diam: 2 1 cm  LVPWd: 1 2 cm  RA Area: 18 4 cm2  RVIDd: 4 4 cm  SV MOD A4C: 69 6 ml  SV(Teich): 59 7 ml    CW  AR Dec Dooly: 2 4 m/s2  AR Dec Time: 1508 3 ms  AR PHT: 437 4 ms  AR Vmax: 3 6 m/s  AR maxP 4 mmHg  AV Env  Ti: 296 9 ms  AV VTI: 90 6 cm  AV Vmax: 4 1 m/s  AV Vmean: 3 1 m/s  AV maxP 1 mmHg  AV meanP 1 mmHg  TR Vmax: 3 3 m/s  TR maxP 3 mmHg    MM  TAPSE: 2 4 cm    PW  VASU (VTI): 1 3 cm2  VASU Vmax: 1 cm2  E': 0 1 m/s  E/E': 18  LVOT Env  Ti: 388 2 ms  LVOT VTI: 34 7 cm  LVOT Vmax: 1 3 m/s  LVOT Vmean: 0 9 m/s  LVOT maxP 6 mmHg  LVOT meanPG: 3 8 mmHg  LVSV Dopp: 114 6 ml  MV A Jose: 1 4 m/s  MV Dec Dooly: 1 8 m/s2  MV DecT: 541 5 ms  MV E Jose: 1 m/s  MV E/A Ratio: 0 7  MV PHT: 157 ms  MVA By PHT: 1 4 cm2    [employer] Accredited Echocardiography Laboratory    Prepared and electronically signed by    Pan Abreu MD  Signed 14-Aug-2018 18:35:14         Medications:    Current Outpatient Prescriptions:     albuterol (PROVENTIL HFA,VENTOLIN HFA) 90 mcg/act inhaler, Inhale 2 puffs every 6 (six) hours as needed for wheezing, Disp: 1 Inhaler, Rfl: 3    amoxicillin (AMOXIL) 500 mg capsule, Take 4 caps prior to dental procedures  , Disp: 30 capsule, Rfl: 0    aspirin (ECOTRIN LOW STRENGTH) 81 mg EC tablet, Take 1 tablet (81 mg total) by mouth daily, Disp: 100 tablet, Rfl: 0    b complex vitamins capsule, Take 1 capsule by mouth 2 (two) times a day  , Disp: , Rfl:     Calcium Carb-Cholecalciferol (CALCIUM 600 + D PO), Take 1 tablet by mouth 2 (two) times a day, Disp: , Rfl:     Fesoterodine Fumarate ER (TOVIAZ) 8 MG TB24, Take 8 mg by mouth daily, Disp: , Rfl:     fluticasone (FLONASE) 50 mcg/act nasal spray, SPRAY 2 SPRAYS INTO EACH NOSTRIL EVERY DAY, Disp: , Rfl: 0    fluticasone-vilanterol (BREO ELLIPTA) 100-25 mcg/inh inhaler, Inhale 1 puff daily Rinse mouth after use , Disp: , Rfl:     furosemide (LASIX) 20 mg tablet, Take 20 mg by mouth daily, Disp: , Rfl:     IRON PO, Take by mouth daily, Disp: , Rfl:     levothyroxine 50 mcg tablet, Take 50 mcg by mouth daily, Disp: , Rfl:     loratadine (CLARITIN) 10 mg tablet, Take 10 mg by mouth daily, Disp: , Rfl:     metoprolol succinate (TOPROL-XL) 25 mg 24 hr tablet, Take 1 tablet (25 mg total) by mouth daily, Disp: 30 tablet, Rfl: 2    omeprazole (PriLOSEC) 40 MG capsule, TAKE ONE CAPSULE BY MOUTH TWICE A DAY, Disp: , Rfl: 1    potassium chloride (KLOR-CON) 20 mEq packet, Take 20 mEq by mouth daily, Disp: , Rfl:     sertraline (ZOLOFT) 50 mg tablet, Take 1 tablet (50 mg total) by mouth daily, Disp: 30 tablet, Rfl: 3    simvastatin (ZOCOR) 40 mg tablet, Take 1 tablet (40 mg total) by mouth daily at bedtime, Disp: 30 tablet, Rfl: 1    temazepam (RESTORIL) 7 5 mg capsule, Take 1 capsule (7 5 mg total) by mouth daily at bedtime as needed for sleep (Patient taking differently: Take 15 mg by mouth daily at bedtime as needed for sleep  ), Disp: 90 capsule, Rfl: 0      Allergies: Allergies   Allergen Reactions    Latex Rash    Neosporin [Neomycin-Bacitracin Zn-Polymyx] Rash and Other (See Comments)     hives per [facility] order         Assessment and Plan:  1  Shortness of breath  Likely multifactorial    Anemia -> improved  Severe AS -> now S/P TAVR  Chronic diastolic HF -> currently euvolemic but asked to try 2 weeks of furosemide and see if it helps  Morbid obesity -> asked to try to loose wt  Pulmonary hypertension  ? Restrictive lung disease from morbid obesity    2  Severe aortic stenosis S/P TAVR   continue aspirin  Needs antibiotic prophylaxis prior to dental work    3  Chronic diastolic (congestive) heart failure (HCC)   low-salt diet  Daily weight  On prn furosemide and K  Cont beta-blocker  Will add ARB / ACEI later    4  Mitral annular calcification, Non-rheumatic mitral valve stenosis   follow up with serial echocardiograms    5  Essential hypertension   BP stable  Cont beta-blocker    6  Mixed hyperlipidemia   continue statin and diet control  Her PCP closely monitors her blood work    7  LVH (left ventricular hypertrophy)   tight BP control    8  Pulmonary hypertension (HCC)   likely from JANICE, MS, AS    9  Morbid obesity ([address] )   unable to lose weight    10  JANICE (obstructive sleep apnea)   regular CPAP use promoted    Patient to discuss with pulmonologist if she still needs her oxygen    Recommend aggressive risk factor modification and therapeutic lifestyle changes  Low-salt, low-calorie, low-fat, low-cholesterol diet with regular exercise and to optimize weight  I will defer the ordering and monitoring of necessity lab studies to you, but I am available and happy to review and manage any of the data at your request in the future      Discussed concepts of atherosclerosis, including signs and symptoms of cardiac disease  Previous studies were reviewed  Safety measures were reviewed  Questions were entertained and answered  Patient was advised to report any problems requiring medical attention  Follow-up with PCP and appropriate specialist and lab work as discussed  Return for follow up visit as scheduled or earlier, if needed  Thank you for allowing me to participate in the care and evaluation of your patient  Should you have any questions, please feel free to contact me        Danica Conway MD  1/28/2019,2:15 PM

## 2019-01-30 DIAGNOSIS — E03.9 ACQUIRED HYPOTHYROIDISM: Primary | ICD-10-CM

## 2019-01-30 RX ORDER — LEVOTHYROXINE SODIUM 0.05 MG/1
50 TABLET ORAL DAILY
Qty: 30 TABLET | Refills: 5 | Status: SHIPPED | OUTPATIENT
Start: 2019-01-30 | End: 2019-06-19 | Stop reason: SDUPTHER

## 2019-02-14 ENCOUNTER — OFFICE VISIT (OUTPATIENT)
Dept: PULMONOLOGY | Facility: CLINIC | Age: 79
End: 2019-02-14
Payer: MEDICARE

## 2019-02-14 VITALS
BODY MASS INDEX: 51.61 KG/M2 | OXYGEN SATURATION: 92 % | WEIGHT: 223 LBS | DIASTOLIC BLOOD PRESSURE: 84 MMHG | HEIGHT: 55 IN | HEART RATE: 80 BPM | SYSTOLIC BLOOD PRESSURE: 114 MMHG

## 2019-02-14 DIAGNOSIS — J44.9 COPD, MILD (HCC): Chronic | ICD-10-CM

## 2019-02-14 DIAGNOSIS — Z99.89 OSA ON CPAP: Chronic | ICD-10-CM

## 2019-02-14 DIAGNOSIS — J45.20 MILD INTERMITTENT REACTIVE AIRWAY DISEASE WITHOUT COMPLICATION: Primary | ICD-10-CM

## 2019-02-14 DIAGNOSIS — G47.33 OSA ON CPAP: Chronic | ICD-10-CM

## 2019-02-14 PROCEDURE — 99214 OFFICE O/P EST MOD 30 MIN: CPT | Performed by: PHYSICIAN ASSISTANT

## 2019-02-15 NOTE — PROGRESS NOTES
Assessment:    1  Mild intermittent reactive airway disease without complication     2  COPD, mild ([address] )     3  JANICE on CPAP           Plan:     Patient is here today for follow-up  She was restarted on her Patience Jc, also switched her Lasix to daily instead of p r n , and has noticed an improvement in her shortness of breath  She continues with her oxygen at nighttime with her CPAP and during the day when she leaves the house  She had prior severe pulmonary hypertension which improved after her aortic valve replacement  She will follow-up with us in 4 months or sooner if necessary  Subjective:     Patient ID: Yoli Nuñez is a 77 y o  female  Chief Complaint:  Patient is a 78 yo female nonsmoker with past medical history of aortic stenosis status post TAVR, hypertension, JANICE on CPAP, hyperlipidemia, obesity, pulmonary hypertension  She is supposed to use 2 L nasal cannula with exertion and at night  She is here today for follow-up  She continued to have dyspnea on exertion despite her aortic valve replacement, pulmonary hypertension came down on repeat echo after her aortic valve replacement  She had not been on any inhalers and does have a history of asthma, with asked her to restart her Breo, albuterol 4 times per day as needed  She does feel that her shortness of breath has improved slightly after starting the inhaler  She was also advised by her cardiologist to increase her Lasix to daily instead of p r n  Juana Prows The following portions of the patient's history were reviewed in this encounter and updated as appropriate:   Review of Systems   Constitutional: Negative  HENT: Negative  Respiratory: Positive for shortness of breath  Cardiovascular: Negative  Gastrointestinal: Negative  Genitourinary: Negative  Musculoskeletal: Positive for arthralgias  Skin: Negative  Allergic/Immunologic: Negative  Neurological: Negative  Psychiatric/Behavioral: Negative  Objective:  /84   Pulse 80   Ht 4' 7" (1 397 m)   Wt 101 kg (223 lb)   SpO2 92% Comment: with 2 lts of oxygen  BMI 51 83 kg/m²   Physical Exam   Constitutional: She is oriented to person, place, and time  She appears well-developed and well-nourished  No distress  HENT:   Mouth/Throat: Oropharynx is clear and moist    Eyes: Pupils are equal, round, and reactive to light  Cardiovascular: Normal rate and regular rhythm  Pulmonary/Chest: Effort normal  No respiratory distress  She has no decreased breath sounds  She has no wheezes  She has no rhonchi  She has no rales  Abdominal: Soft  There is no tenderness  Musculoskeletal: Normal range of motion  She exhibits no edema  Neurological: She is alert and oriented to person, place, and time  Skin: Skin is warm and dry  Psychiatric: She has a normal mood and affect

## 2019-02-26 DIAGNOSIS — R06.02 SHORTNESS OF BREATH: Primary | ICD-10-CM

## 2019-02-26 DIAGNOSIS — J45.20 MILD INTERMITTENT ASTHMA WITHOUT COMPLICATION: Primary | ICD-10-CM

## 2019-02-26 RX ORDER — ALBUTEROL SULFATE 2.5 MG/3ML
2.5 SOLUTION RESPIRATORY (INHALATION) EVERY 6 HOURS PRN
Qty: 360 ML | Refills: 1 | Status: SHIPPED | OUTPATIENT
Start: 2019-02-26 | End: 2020-06-16 | Stop reason: SDUPTHER

## 2019-02-26 RX ORDER — FLUTICASONE FUROATE AND VILANTEROL 100; 25 UG/1; UG/1
1 POWDER RESPIRATORY (INHALATION) DAILY
Qty: 1 INHALER | Refills: 3 | Status: SHIPPED | OUTPATIENT
Start: 2019-02-26 | End: 2019-06-18 | Stop reason: SDUPTHER

## 2019-03-08 ENCOUNTER — OFFICE VISIT (OUTPATIENT)
Dept: FAMILY MEDICINE CLINIC | Facility: CLINIC | Age: 79
End: 2019-03-08
Payer: MEDICARE

## 2019-03-08 VITALS
BODY MASS INDEX: 50.91 KG/M2 | HEIGHT: 55 IN | HEART RATE: 55 BPM | DIASTOLIC BLOOD PRESSURE: 70 MMHG | WEIGHT: 220 LBS | SYSTOLIC BLOOD PRESSURE: 110 MMHG | TEMPERATURE: 97.4 F | OXYGEN SATURATION: 93 %

## 2019-03-08 DIAGNOSIS — H66.90 CHRONIC OTITIS MEDIA, UNSPECIFIED OTITIS MEDIA TYPE: Primary | ICD-10-CM

## 2019-03-08 DIAGNOSIS — F41.8 DEPRESSION WITH ANXIETY: ICD-10-CM

## 2019-03-08 DIAGNOSIS — G44.89 OTHER HEADACHE SYNDROME: ICD-10-CM

## 2019-03-08 PROCEDURE — 99214 OFFICE O/P EST MOD 30 MIN: CPT | Performed by: FAMILY MEDICINE

## 2019-03-08 NOTE — PROGRESS NOTES
Assessment/Plan:     Chronic Problems:  Depression with anxiety  Will increase the zoloft to 75 mg daily  Visit Diagnosis:  Diagnoses and all orders for this visit:    Chronic otitis media, unspecified otitis media type  Comments: Will refer to Dr Lanette Mohamud as pt has h/o chronic otitis with large mastoid effusion  Orders:  -     Cancel: Ambulatory Referral to Otolaryngology; Future  -     Ambulatory Referral to Otolaryngology; Future    Other headache syndrome  Comments:  I suspect this may be r/t mastoid effusion  Will refer to Dr Lanette Mohamud  Depression with anxiety  -     sertraline (ZOLOFT) 50 mg tablet; Take 1 5 tablets (75 mg total) by mouth daily          Subjective:    Patient ID: Ashlee Mills is a 77 y o  female  Patient is here with complaints of still not feeling right in the head  She feels she has pressure in the entire head and sometimes will have pain  Plavix has been stopped as she is 3 months status post have her and she uses Excedrin and Advil for the pain  Thinks the Excedrin may help a little bit  Still think she has a problem with the left ear  Followed with Dr Shawna Rangel for this but the ear has been a chronic problem  No fever sweats or chills  Takes all other meds as directed  No side effects noted  Still sob at times, but not as bad as it was  The following portions of the patient's history were reviewed and updated as appropriate: allergies, current medications, past family history, past medical history, past social history, past surgical history and problem list     Review of Systems   Constitutional: Negative for chills, diaphoresis, fatigue and fever  HENT: Positive for ear pain (and feels clogged with pressure around her head  )  Negative for congestion, sinus pressure and sinus pain  Eyes:        Keeps seeing flashers in bilateral eyes  Seen by Dr Donzella Jeans twice and told no pathology  Has a f/u with her      Respiratory: Positive for shortness of breath (but better than in the past  )  Negative for cough and wheezing  Cardiovascular: Negative for chest pain and palpitations  Follows with Dr Ewa Iniguez  Gastrointestinal: Negative  Genitourinary: Negative  Musculoskeletal: Positive for arthralgias (knee pain on occasion  Both knees have been replaced  )  Neurological: Positive for headaches  Negative for dizziness and light-headedness  Psychiatric/Behavioral: Positive for dysphoric mood (at times  )  The patient is nervous/anxious (at times  )            /70   Pulse 55   Temp (!) 97 4 °F (36 3 °C)   Ht 4' 7" (1 397 m)   Wt 99 8 kg (220 lb)   SpO2 93%   BMI 51 13 kg/m²   Social History     Socioeconomic History    Marital status: Single     Spouse name: Not on file    Number of children: Not on file    Years of education: Not on file    Highest education level: Not on file   Occupational History    Not on file   Social Needs    Financial resource strain: Not on file    Food insecurity:     Worry: Not on file     Inability: Not on file    Transportation needs:     Medical: Not on file     Non-medical: Not on file   Tobacco Use    Smoking status: Never Smoker    Smokeless tobacco: Never Used   Substance and Sexual Activity    Alcohol use: No    Drug use: No    Sexual activity: Never   Lifestyle    Physical activity:     Days per week: Not on file     Minutes per session: Not on file    Stress: Not on file   Relationships    Social connections:     Talks on phone: Not on file     Gets together: Not on file     Attends [employer] service: Not on file     Active member of club or organization: Not on file     Attends meetings of clubs or organizations: Not on file     Relationship status: Not on file    Intimate partner violence:     Fear of current or ex partner: Not on file     Emotionally abused: Not on file     Physically abused: Not on file     Forced sexual activity: Not on file   Other Topics Concern    Not on file   Social History Narrative    Denied drinking coffee    Denied exercise habits     Past Medical History:   Diagnosis Date    Anemia 08/22/2018    Arthritis     AVB (atrioventricular block)     first degree    CHF (congestive heart failure) (HCC)     COPD, mild (HCC)     Coronary artery disease     Dislocation of right shoulder joint     Frequent UTI     GERD (gastroesophageal reflux disease)     H/O: pneumonia     Heme positive stool     History of uterine cancer     s/p CALLIE    Hyperlipidemia     Hypertension     Hypothyroidism     Obesity, morbid ([facility] [address] ) 08/22/2018    JANICE on CPAP     [facility] 08/22/2018    Severe aortic stenosis     Simple goiter     Skin cyst     within the armpits, right     Family History   Problem Relation Age of Onset    Diabetes Mother     Stroke Mother     Cancer Father     Lung cancer Father     Diabetes Sister     Heart disease Sister     Coronary artery disease Family     Diabetes Family     Hypertension Family     Cancer Family     Stroke Family      Past Surgical History:   Procedure Laterality Date    BREAST BIOPSY      CARDIAC CATHETERIZATION      CARPAL TUNNEL RELEASE Bilateral     CHOLECYSTECTOMY      DILATION AND CURETTAGE OF UTERUS      HYSTEROSCOPY      MD COLONOSCOPY FLX DX W/COLLJ SPEC WHEN PFRMD N/A 9/6/2018    Procedure: COLONOSCOPY;  Surgeon: Kamini Kellogg MD;  Location: [facility]; Service: Gastroenterology    MD ECHO TRANSESOPHAG R-T 2D W/PRB IMG ACQUISJ I&R N/A 10/9/2018    Procedure: INTRA-OP TRANSESOPHAGEAL ECHOCARDIOGRAM (GARRISON); Surgeon: Michelle Savage DO;  Location:  MAIN OR;  Service: Cardiac Surgery    MD ESOPHAGOGASTRODUODENOSCOPY TRANSORAL DIAGNOSTIC N/A 8/31/2018    Procedure: ESOPHAGOGASTRODUODENOSCOPY (EGD); Surgeon: Kamini Kellogg MD;  Location: [facility];   Service: Gastroenterology    MD REPLACE AORTIC VALVE OPENFEMORAL ARTERY APPROACH N/A 10/9/2018    Procedure: REPLACEMENT AORTIC VALVE TRANSCATHETER (TAVR) TRANSFEMORAL W/ 23 MM MENDOZA NOE S3 VALVE (ACCESS OF LEFT);   Surgeon: Inez Castaneda DO;  [facility]: [facility];  Service: Cardiac Surgery    TOTAL ABDOMINAL HYSTERECTOMY      TOTAL HIP ARTHROPLASTY Left 2007    TOTAL KNEE ARTHROPLASTY Bilateral        Current Outpatient Medications:     albuterol (2 5 mg/3 mL) 0 083 % nebulizer solution, Take 1 vial (2 5 mg total) by nebulization every 6 (six) hours as needed for wheezing or shortness of breath, Disp: 360 mL, Rfl: 1    albuterol (PROVENTIL HFA,VENTOLIN HFA) 90 mcg/act inhaler, Inhale 2 puffs every 6 (six) hours as needed for wheezing, Disp: 1 Inhaler, Rfl: 3    aspirin (ECOTRIN LOW STRENGTH) 81 mg EC tablet, Take 1 tablet (81 mg total) by mouth daily, Disp: 100 tablet, Rfl: 0    b complex vitamins capsule, Take 1 capsule by mouth 2 (two) times a day  , Disp: , Rfl:     Calcium Carb-Cholecalciferol (CALCIUM 600 + D PO), Take 1 tablet by mouth 2 (two) times a day, Disp: , Rfl:     Fesoterodine Fumarate ER (TOVIAZ) 8 MG TB24, Take 8 mg by mouth daily, Disp: , Rfl:     fluticasone (FLONASE) 50 mcg/act nasal spray, SPRAY 2 SPRAYS INTO EACH NOSTRIL EVERY DAY, Disp: , Rfl: 0    fluticasone-vilanterol (BREO ELLIPTA) 100-25 mcg/inh inhaler, Inhale 1 puff daily Rinse mouth after use , Disp: 1 Inhaler, Rfl: 3    furosemide (LASIX) 20 mg tablet, Take 1 tablet (20 mg total) by mouth daily, Disp: 90 tablet, Rfl: 1    IRON PO, Take by mouth daily, Disp: , Rfl:     levothyroxine 50 mcg tablet, Take 1 tablet (50 mcg total) by mouth daily 1 po qd a m , Disp: 30 tablet, Rfl: 5    loratadine (CLARITIN) 10 mg tablet, Take 10 mg by mouth daily, Disp: , Rfl:     metoprolol succinate (TOPROL-XL) 25 mg 24 hr tablet, Take 1 tablet (25 mg total) by mouth daily, Disp: 30 tablet, Rfl: 2    omeprazole (PriLOSEC) 40 MG capsule, TAKE ONE CAPSULE BY MOUTH TWICE A DAY, Disp: , Rfl: 1    potassium chloride (K-DUR) 10 mEq tablet, Take 1 tablet (10 mEq total) by mouth 2 (two) times a day, Disp: 90 tablet, Rfl: 1    potassium chloride (KLOR-CON) 20 mEq packet, Take 20 mEq by mouth daily, Disp: , Rfl:     sertraline (ZOLOFT) 50 mg tablet, Take 1 5 tablets (75 mg total) by mouth daily, Disp: 45 tablet, Rfl: 3    simvastatin (ZOCOR) 40 mg tablet, Take 1 tablet (40 mg total) by mouth daily at bedtime, Disp: 30 tablet, Rfl: 1    temazepam (RESTORIL) 7 5 mg capsule, Take 1 capsule (7 5 mg total) by mouth daily at bedtime as needed for sleep (Patient taking differently: Take 15 mg by mouth daily at bedtime as needed for sleep  ), Disp: 90 capsule, Rfl: 0    Allergies   Allergen Reactions    Latex Rash    Neosporin [Neomycin-Bacitracin Zn-Polymyx] Rash and Other (See Comments)     hives per [facility] order          Lab Review   Appointment on 01/14/2019   Component Date Value    Sodium 01/14/2019 141     Potassium 01/14/2019 4 4     Chloride 01/14/2019 103     CO2 01/14/2019 33*    ANION GAP 01/14/2019 5     BUN 01/14/2019 22     Creatinine 01/14/2019 0 96     Glucose 01/14/2019 88     Calcium 01/14/2019 9 5     eGFR 01/14/2019 56     D-Dimer, Quant 01/14/2019 802*        Imaging: No results found  Objective:     Physical Exam   Constitutional: She is oriented to person, place, and time  She appears well-developed and well-nourished  No distress  Pt is morbidly obese   HENT:   Head: Normocephalic and atraumatic  Right Ear: External ear normal    Unable to visualize left tm r/t opacification vs  Discharge  Eyes: Pupils are equal, round, and reactive to light  EOM are normal    Neck: Normal range of motion  Neck supple  Cardiovascular: Normal rate, regular rhythm and normal heart sounds  Pulmonary/Chest: Effort normal and breath sounds normal  No respiratory distress  She has no wheezes  She has no rales  Abdominal:   Abdomen is morbidly obese  Musculoskeletal: Normal range of motion  She exhibits no edema or deformity     Lymphadenopathy: She has no cervical adenopathy  Neurological: She is alert and oriented to person, place, and time  No cranial nerve deficit or sensory deficit  Coordination normal    Negative finger to nose  Skin: Skin is warm and dry  She is not diaphoretic  Psychiatric: She has a normal mood and affect  Her behavior is normal  Judgment and thought content normal          Patient Instructions   Discussed all with patient  Will defer the MRI until patient is seen by Dr Hernandez Later ent  Please make your appt asap  Will also increase the Zoloft to 1-1/2 daily  The concern is you had such a large mastoid effusion and you have been treated several times for ear infections  You need a 2nd opinion  Follow-up here in 3 weeks  MABEL Castro    Portions of the record may have been created with voice recognition software   Occasional wrong word or "sound a like" substitutions may have occurred due to the inherent limitations of voice recognition software   Read the chart carefully and recognize, using context, where substitutions have occurred

## 2019-03-08 NOTE — PATIENT INSTRUCTIONS
Discussed all with patient  Will defer the MRI until patient is seen by Dr Lanette Mohamud ent  Please make your appt asap  Will also increase the Zoloft to 1-1/2 daily  The concern is you had such a large mastoid effusion and you have been treated several times for ear infections  You need a 2nd opinion  Follow-up here in 3 weeks

## 2019-03-18 DIAGNOSIS — I50.32 CHRONIC DIASTOLIC (CONGESTIVE) HEART FAILURE (HCC): ICD-10-CM

## 2019-03-18 RX ORDER — METOPROLOL SUCCINATE 25 MG/1
25 TABLET, EXTENDED RELEASE ORAL DAILY
Qty: 30 TABLET | Refills: 2 | Status: SHIPPED | OUTPATIENT
Start: 2019-03-18 | End: 2019-06-11 | Stop reason: SDUPTHER

## 2019-03-18 NOTE — TELEPHONE ENCOUNTER
PT NEEDS REFILL ON METOPROLOL SUCCINATE 25MG (90 DAY SUPPLY) SENT TO [facility] [address] IN [place]

## 2019-04-01 ENCOUNTER — OFFICE VISIT (OUTPATIENT)
Dept: FAMILY MEDICINE CLINIC | Facility: CLINIC | Age: 79
End: 2019-04-01
Payer: MEDICARE

## 2019-04-01 VITALS
TEMPERATURE: 97.6 F | DIASTOLIC BLOOD PRESSURE: 60 MMHG | SYSTOLIC BLOOD PRESSURE: 120 MMHG | HEIGHT: 55 IN | RESPIRATION RATE: 16 BRPM | BODY MASS INDEX: 51.24 KG/M2 | OXYGEN SATURATION: 90 % | WEIGHT: 221.4 LBS | HEART RATE: 64 BPM

## 2019-04-01 DIAGNOSIS — Z95.2 S/P TAVR (TRANSCATHETER AORTIC VALVE REPLACEMENT): ICD-10-CM

## 2019-04-01 DIAGNOSIS — E66.01 MORBID OBESITY (HCC): ICD-10-CM

## 2019-04-01 DIAGNOSIS — I35.0 AORTIC STENOSIS, SEVERE: ICD-10-CM

## 2019-04-01 DIAGNOSIS — Z99.89 OSA ON CPAP: Chronic | ICD-10-CM

## 2019-04-01 DIAGNOSIS — R06.02 SHORTNESS OF BREATH: ICD-10-CM

## 2019-04-01 DIAGNOSIS — G47.33 OSA ON CPAP: Chronic | ICD-10-CM

## 2019-04-01 DIAGNOSIS — H66.90 CHRONIC OTITIS MEDIA, UNSPECIFIED OTITIS MEDIA TYPE: Primary | ICD-10-CM

## 2019-04-01 DIAGNOSIS — I50.32 CHRONIC DIASTOLIC (CONGESTIVE) HEART FAILURE (HCC): ICD-10-CM

## 2019-04-01 PROCEDURE — 99214 OFFICE O/P EST MOD 30 MIN: CPT | Performed by: FAMILY MEDICINE

## 2019-04-01 RX ORDER — SIMVASTATIN 40 MG
40 TABLET ORAL
Qty: 90 TABLET | Refills: 1 | Status: SHIPPED | OUTPATIENT
Start: 2019-04-01 | End: 2019-09-21 | Stop reason: SDUPTHER

## 2019-04-03 ENCOUNTER — APPOINTMENT (OUTPATIENT)
Dept: LAB | Facility: HOSPITAL | Age: 79
End: 2019-04-03
Payer: MEDICARE

## 2019-04-03 ENCOUNTER — TRANSCRIBE ORDERS (OUTPATIENT)
Dept: ADMINISTRATIVE | Facility: HOSPITAL | Age: 79
End: 2019-04-03

## 2019-04-03 DIAGNOSIS — D64.9 ANEMIA, UNSPECIFIED TYPE: Primary | ICD-10-CM

## 2019-04-03 DIAGNOSIS — D64.9 ANEMIA, UNSPECIFIED TYPE: ICD-10-CM

## 2019-04-03 LAB
BASOPHILS # BLD AUTO: 0.06 THOUSANDS/ΜL (ref 0–0.1)
BASOPHILS NFR BLD AUTO: 1 % (ref 0–1)
EOSINOPHIL # BLD AUTO: 0.59 THOUSAND/ΜL (ref 0–0.61)
EOSINOPHIL NFR BLD AUTO: 8 % (ref 0–6)
ERYTHROCYTE [DISTWIDTH] IN BLOOD BY AUTOMATED COUNT: 17.5 % (ref 11.6–15.1)
FERRITIN SERPL-MCNC: 28 NG/ML (ref 8–388)
HCT VFR BLD AUTO: 44.2 % (ref 34.8–46.1)
HGB BLD-MCNC: 13.2 G/DL (ref 11.5–15.4)
IMM GRANULOCYTES # BLD AUTO: 0.04 THOUSAND/UL (ref 0–0.2)
IMM GRANULOCYTES NFR BLD AUTO: 1 % (ref 0–2)
IRON SATN MFR SERPL: 17 %
IRON SERPL-MCNC: 56 UG/DL (ref 50–170)
LYMPHOCYTES # BLD AUTO: 1.86 THOUSANDS/ΜL (ref 0.6–4.47)
LYMPHOCYTES NFR BLD AUTO: 24 % (ref 14–44)
MCH RBC QN AUTO: 25.5 PG (ref 26.8–34.3)
MCHC RBC AUTO-ENTMCNC: 29.9 G/DL (ref 31.4–37.4)
MCV RBC AUTO: 85 FL (ref 82–98)
MONOCYTES # BLD AUTO: 0.78 THOUSAND/ΜL (ref 0.17–1.22)
MONOCYTES NFR BLD AUTO: 10 % (ref 4–12)
NEUTROPHILS # BLD AUTO: 4.43 THOUSANDS/ΜL (ref 1.85–7.62)
NEUTS SEG NFR BLD AUTO: 56 % (ref 43–75)
NRBC BLD AUTO-RTO: 0 /100 WBCS
PLATELET # BLD AUTO: 308 THOUSANDS/UL (ref 149–390)
PMV BLD AUTO: 10.1 FL (ref 8.9–12.7)
RBC # BLD AUTO: 5.18 MILLION/UL (ref 3.81–5.12)
TIBC SERPL-MCNC: 333 UG/DL (ref 250–450)
WBC # BLD AUTO: 7.76 THOUSAND/UL (ref 4.31–10.16)

## 2019-04-03 PROCEDURE — 83550 IRON BINDING TEST: CPT

## 2019-04-03 PROCEDURE — 83540 ASSAY OF IRON: CPT

## 2019-04-03 PROCEDURE — 85025 COMPLETE CBC W/AUTO DIFF WBC: CPT

## 2019-04-03 PROCEDURE — 36415 COLL VENOUS BLD VENIPUNCTURE: CPT

## 2019-04-03 PROCEDURE — 82728 ASSAY OF FERRITIN: CPT

## 2019-04-19 ENCOUNTER — HOSPITAL ENCOUNTER (OUTPATIENT)
Dept: CT IMAGING | Facility: HOSPITAL | Age: 79
Discharge: HOME | End: 2019-04-19
Payer: MEDICARE

## 2019-04-19 ENCOUNTER — TRANSCRIBE ORDERS (OUTPATIENT)
Dept: ADMINISTRATIVE | Facility: HOSPITAL | Age: 79
End: 2019-04-19

## 2019-04-19 ENCOUNTER — APPOINTMENT (OUTPATIENT)
Dept: LAB | Facility: HOSPITAL | Age: 79
End: 2019-04-19
Payer: MEDICARE

## 2019-04-19 DIAGNOSIS — D44.7 PARAGANGLIOMA (HCC): ICD-10-CM

## 2019-04-19 DIAGNOSIS — D38.5 NEOPLASM OF UNCERTAIN BEHAVIOR OF NASAL CAVITIES: ICD-10-CM

## 2019-04-19 DIAGNOSIS — D38.5 NEOPLASM OF UNCERTAIN BEHAVIOR OF NASAL CAVITIES: Primary | ICD-10-CM

## 2019-04-19 LAB
BUN SERPL-MCNC: 18 MG/DL (ref 5–25)
CREAT SERPL-MCNC: 0.82 MG/DL (ref 0.6–1.3)
GFR SERPL CREATININE-BSD FRML MDRD: 68 ML/MIN/1.73SQ M

## 2019-04-19 PROCEDURE — 70496 CT ANGIOGRAPHY HEAD: CPT

## 2019-04-19 PROCEDURE — 36415 COLL VENOUS BLD VENIPUNCTURE: CPT

## 2019-04-19 PROCEDURE — 82565 ASSAY OF CREATININE: CPT

## 2019-04-19 PROCEDURE — 84520 ASSAY OF UREA NITROGEN: CPT

## 2019-04-19 PROCEDURE — 70498 CT ANGIOGRAPHY NECK: CPT

## 2019-04-19 RX ADMIN — IOHEXOL 85 ML: 350 INJECTION, SOLUTION INTRAVENOUS at 13:20

## 2019-04-20 ENCOUNTER — HOSPITAL ENCOUNTER (EMERGENCY)
Facility: HOSPITAL | Age: 79
Discharge: HOME | End: 2019-04-20
Attending: EMERGENCY MEDICINE | Admitting: EMERGENCY MEDICINE
Payer: MEDICARE

## 2019-04-20 ENCOUNTER — APPOINTMENT (EMERGENCY)
Dept: CT IMAGING | Facility: HOSPITAL | Age: 79
End: 2019-04-20
Payer: MEDICARE

## 2019-04-20 VITALS
RESPIRATION RATE: 16 BRPM | SYSTOLIC BLOOD PRESSURE: 140 MMHG | BODY MASS INDEX: 53.19 KG/M2 | WEIGHT: 228.84 LBS | OXYGEN SATURATION: 94 % | DIASTOLIC BLOOD PRESSURE: 67 MMHG | TEMPERATURE: 97.7 F | HEART RATE: 59 BPM
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
## 2019-04-20 DIAGNOSIS — S09.90XA INJURY OF HEAD, INITIAL ENCOUNTER: ICD-10-CM

## 2019-04-20 PROCEDURE — 99284 EMERGENCY DEPT VISIT MOD MDM: CPT

## 2019-04-20 PROCEDURE — 72125 CT NECK SPINE W/O DYE: CPT

## 2019-04-20 PROCEDURE — 99284 EMERGENCY DEPT VISIT MOD MDM: CPT | Performed by: EMERGENCY MEDICINE

## 2019-04-20 PROCEDURE — 70450 CT HEAD/BRAIN W/O DYE: CPT

## 2019-04-28 DIAGNOSIS — I50.32 CHRONIC DIASTOLIC (CONGESTIVE) HEART FAILURE (HCC): ICD-10-CM

## 2019-04-28 DIAGNOSIS — R06.00 DYSPNEA, UNSPECIFIED TYPE: ICD-10-CM

## 2019-04-29 ENCOUNTER — OFFICE VISIT (OUTPATIENT)
Dept: CARDIOLOGY CLINIC | Facility: CLINIC | Age: 79
End: 2019-04-29
Payer: MEDICARE

## 2019-04-29 VITALS
HEART RATE: 61 BPM | SYSTOLIC BLOOD PRESSURE: 136 MMHG | WEIGHT: 222 LBS | DIASTOLIC BLOOD PRESSURE: 80 MMHG | BODY MASS INDEX: 51.38 KG/M2 | OXYGEN SATURATION: 90 % | HEIGHT: 55 IN

## 2019-04-29 DIAGNOSIS — I50.32 CHRONIC DIASTOLIC (CONGESTIVE) HEART FAILURE (HCC): Primary | ICD-10-CM

## 2019-04-29 DIAGNOSIS — I34.2 NON-RHEUMATIC MITRAL VALVE STENOSIS: ICD-10-CM

## 2019-04-29 DIAGNOSIS — Z95.2 S/P TAVR (TRANSCATHETER AORTIC VALVE REPLACEMENT): ICD-10-CM

## 2019-04-29 DIAGNOSIS — E78.2 MIXED HYPERLIPIDEMIA: ICD-10-CM

## 2019-04-29 DIAGNOSIS — E66.01 MORBID OBESITY (HCC): ICD-10-CM

## 2019-04-29 DIAGNOSIS — I35.0 SEVERE AORTIC STENOSIS: ICD-10-CM

## 2019-04-29 DIAGNOSIS — I27.20 PULMONARY HYPERTENSION (HCC): ICD-10-CM

## 2019-04-29 DIAGNOSIS — I10 ESSENTIAL HYPERTENSION: ICD-10-CM

## 2019-04-29 PROCEDURE — 99214 OFFICE O/P EST MOD 30 MIN: CPT | Performed by: INTERNAL MEDICINE

## 2019-04-29 RX ORDER — POTASSIUM CHLORIDE 750 MG/1
TABLET, FILM COATED, EXTENDED RELEASE ORAL
Qty: 90 TABLET | Refills: 1 | Status: SHIPPED | OUTPATIENT
Start: 2019-04-29 | End: 2019-07-24 | Stop reason: SDUPTHER

## 2019-05-03 DIAGNOSIS — G47.00 INSOMNIA, UNSPECIFIED TYPE: ICD-10-CM

## 2019-05-03 RX ORDER — TEMAZEPAM 7.5 MG/1
7.5 CAPSULE ORAL
Qty: 90 CAPSULE | Refills: 0 | Status: SHIPPED | OUTPATIENT
Start: 2019-05-03 | End: 2019-10-15 | Stop reason: SDUPTHER

## 2019-05-23 ENCOUNTER — OFFICE VISIT (OUTPATIENT)
Dept: PULMONOLOGY | Facility: CLINIC | Age: 79
End: 2019-05-23
Payer: MEDICARE

## 2019-05-23 VITALS
WEIGHT: 222 LBS | OXYGEN SATURATION: 90 % | BODY MASS INDEX: 51.38 KG/M2 | SYSTOLIC BLOOD PRESSURE: 132 MMHG | DIASTOLIC BLOOD PRESSURE: 78 MMHG | HEIGHT: 55 IN | HEART RATE: 64 BPM

## 2019-05-23 DIAGNOSIS — J45.20 MILD INTERMITTENT REACTIVE AIRWAY DISEASE WITHOUT COMPLICATION: ICD-10-CM

## 2019-05-23 DIAGNOSIS — Z99.89 OSA ON CPAP: Chronic | ICD-10-CM

## 2019-05-23 DIAGNOSIS — R06.02 SHORTNESS OF BREATH: Primary | ICD-10-CM

## 2019-05-23 DIAGNOSIS — G47.33 OSA ON CPAP: Chronic | ICD-10-CM

## 2019-05-23 PROCEDURE — 99213 OFFICE O/P EST LOW 20 MIN: CPT | Performed by: PHYSICIAN ASSISTANT

## 2019-06-03 ENCOUNTER — TELEPHONE (OUTPATIENT)
Dept: FAMILY MEDICINE CLINIC | Facility: CLINIC | Age: 79
End: 2019-06-03

## 2019-06-04 ENCOUNTER — TELEPHONE (OUTPATIENT)
Dept: CARDIOLOGY CLINIC | Facility: CLINIC | Age: 79
End: 2019-06-04

## 2019-06-04 ENCOUNTER — OFFICE VISIT (OUTPATIENT)
Dept: CARDIOLOGY CLINIC | Facility: CLINIC | Age: 79
End: 2019-06-04
Payer: MEDICARE

## 2019-06-04 VITALS
HEART RATE: 68 BPM | OXYGEN SATURATION: 92 % | HEIGHT: 55 IN | DIASTOLIC BLOOD PRESSURE: 72 MMHG | WEIGHT: 221 LBS | BODY MASS INDEX: 51.14 KG/M2 | SYSTOLIC BLOOD PRESSURE: 134 MMHG

## 2019-06-04 DIAGNOSIS — I35.0 SEVERE AORTIC STENOSIS: ICD-10-CM

## 2019-06-04 DIAGNOSIS — I10 ESSENTIAL HYPERTENSION: ICD-10-CM

## 2019-06-04 DIAGNOSIS — R06.02 SHORTNESS OF BREATH: ICD-10-CM

## 2019-06-04 DIAGNOSIS — E78.2 MIXED HYPERLIPIDEMIA: ICD-10-CM

## 2019-06-04 DIAGNOSIS — Z95.2 S/P TAVR (TRANSCATHETER AORTIC VALVE REPLACEMENT): ICD-10-CM

## 2019-06-04 DIAGNOSIS — I50.32 CHRONIC DIASTOLIC (CONGESTIVE) HEART FAILURE (HCC): ICD-10-CM

## 2019-06-04 DIAGNOSIS — Z01.810 PREOP CARDIOVASCULAR EXAM: Primary | ICD-10-CM

## 2019-06-04 DIAGNOSIS — G47.33 OSA (OBSTRUCTIVE SLEEP APNEA): ICD-10-CM

## 2019-06-04 DIAGNOSIS — I05.9 MITRAL ANNULAR CALCIFICATION: ICD-10-CM

## 2019-06-04 DIAGNOSIS — E66.01 MORBID OBESITY (HCC): ICD-10-CM

## 2019-06-04 DIAGNOSIS — I34.2 NON-RHEUMATIC MITRAL VALVE STENOSIS: ICD-10-CM

## 2019-06-04 DIAGNOSIS — I27.20 PULMONARY HYPERTENSION (HCC): ICD-10-CM

## 2019-06-04 DIAGNOSIS — I51.7 LVH (LEFT VENTRICULAR HYPERTROPHY): ICD-10-CM

## 2019-06-04 PROCEDURE — 93000 ELECTROCARDIOGRAM COMPLETE: CPT | Performed by: INTERNAL MEDICINE

## 2019-06-04 PROCEDURE — 99214 OFFICE O/P EST MOD 30 MIN: CPT | Performed by: INTERNAL MEDICINE

## 2019-06-11 ENCOUNTER — OFFICE VISIT (OUTPATIENT)
Dept: PULMONOLOGY | Facility: CLINIC | Age: 79
End: 2019-06-11
Payer: MEDICARE

## 2019-06-11 VITALS
RESPIRATION RATE: 16 BRPM | OXYGEN SATURATION: 92 % | HEART RATE: 54 BPM | HEIGHT: 55 IN | BODY MASS INDEX: 50.91 KG/M2 | WEIGHT: 220 LBS | SYSTOLIC BLOOD PRESSURE: 132 MMHG | DIASTOLIC BLOOD PRESSURE: 68 MMHG

## 2019-06-11 DIAGNOSIS — I50.32 CHRONIC DIASTOLIC (CONGESTIVE) HEART FAILURE (HCC): ICD-10-CM

## 2019-06-11 DIAGNOSIS — J45.20 MILD INTERMITTENT REACTIVE AIRWAY DISEASE WITHOUT COMPLICATION: ICD-10-CM

## 2019-06-11 DIAGNOSIS — G47.33 OSA ON CPAP: Chronic | ICD-10-CM

## 2019-06-11 DIAGNOSIS — J96.11 CHRONIC RESPIRATORY FAILURE WITH HYPOXIA (HCC): ICD-10-CM

## 2019-06-11 DIAGNOSIS — Z01.811 ENCOUNTER FOR PREOPERATIVE PULMONARY EXAMINATION: Primary | ICD-10-CM

## 2019-06-11 DIAGNOSIS — Z99.89 OSA ON CPAP: Chronic | ICD-10-CM

## 2019-06-11 PROCEDURE — 99214 OFFICE O/P EST MOD 30 MIN: CPT | Performed by: PHYSICIAN ASSISTANT

## 2019-06-11 RX ORDER — METOPROLOL SUCCINATE 25 MG/1
TABLET, EXTENDED RELEASE ORAL
Qty: 30 TABLET | Refills: 2 | Status: SHIPPED | OUTPATIENT
Start: 2019-06-11 | End: 2019-07-05 | Stop reason: SDUPTHER

## 2019-06-14 ENCOUNTER — TELEPHONE (OUTPATIENT)
Dept: PULMONOLOGY | Facility: CLINIC | Age: 79
End: 2019-06-14

## 2019-06-18 DIAGNOSIS — R06.02 SHORTNESS OF BREATH: ICD-10-CM

## 2019-06-18 RX ORDER — FLUTICASONE FUROATE AND VILANTEROL TRIFENATATE 100; 25 UG/1; UG/1
POWDER RESPIRATORY (INHALATION)
Qty: 60 EACH | Refills: 3 | Status: SHIPPED | OUTPATIENT
Start: 2019-06-18 | End: 2019-10-16 | Stop reason: SDUPTHER

## 2019-06-19 DIAGNOSIS — E03.9 ACQUIRED HYPOTHYROIDISM: ICD-10-CM

## 2019-06-19 RX ORDER — LEVOTHYROXINE SODIUM 0.05 MG/1
TABLET ORAL
Qty: 30 TABLET | Refills: 5 | Status: SHIPPED | OUTPATIENT
Start: 2019-06-19 | End: 2019-10-12 | Stop reason: SDUPTHER

## 2019-06-27 ENCOUNTER — APPOINTMENT (OUTPATIENT)
Dept: LAB | Facility: HOSPITAL | Age: 79
End: 2019-06-27
Payer: MEDICARE

## 2019-06-27 ENCOUNTER — HOSPITAL ENCOUNTER (OUTPATIENT)
Dept: RADIOLOGY | Facility: HOSPITAL | Age: 79
Discharge: HOME | End: 2019-06-27
Payer: MEDICARE

## 2019-06-27 ENCOUNTER — TRANSCRIBE ORDERS (OUTPATIENT)
Dept: ADMINISTRATIVE | Facility: HOSPITAL | Age: 79
End: 2019-06-27

## 2019-06-27 DIAGNOSIS — Z01.812 PRE-OPERATIVE LABORATORY EXAMINATION: ICD-10-CM

## 2019-06-27 DIAGNOSIS — Z01.818 OTHER SPECIFIED PRE-OPERATIVE EXAMINATION: ICD-10-CM

## 2019-06-27 DIAGNOSIS — Z01.811 ENCOUNTER FOR PREOPERATIVE PULMONARY EXAMINATION: ICD-10-CM

## 2019-06-27 DIAGNOSIS — Z01.812 PRE-OPERATIVE LABORATORY EXAMINATION: Primary | ICD-10-CM

## 2019-06-27 LAB
BUN SERPL-MCNC: 20 MG/DL (ref 5–25)
CREAT SERPL-MCNC: 0.93 MG/DL (ref 0.6–1.3)
GFR SERPL CREATININE-BSD FRML MDRD: 59 ML/MIN/1.73SQ M
GLUCOSE P FAST SERPL-MCNC: 77 MG/DL (ref 65–99)
HCT VFR BLD AUTO: 44.6 % (ref 34.8–46.1)
HGB BLD-MCNC: 13.8 G/DL (ref 11.5–15.4)

## 2019-06-27 PROCEDURE — 71046 X-RAY EXAM CHEST 2 VIEWS: CPT

## 2019-06-27 PROCEDURE — 85018 HEMOGLOBIN: CPT

## 2019-06-27 PROCEDURE — 82565 ASSAY OF CREATININE: CPT

## 2019-06-27 PROCEDURE — 82947 ASSAY GLUCOSE BLOOD QUANT: CPT

## 2019-06-27 PROCEDURE — 36415 COLL VENOUS BLD VENIPUNCTURE: CPT

## 2019-06-27 PROCEDURE — 84520 ASSAY OF UREA NITROGEN: CPT

## 2019-06-27 PROCEDURE — 85014 HEMATOCRIT: CPT

## 2019-07-01 ENCOUNTER — OFFICE VISIT (OUTPATIENT)
Dept: FAMILY MEDICINE CLINIC | Facility: CLINIC | Age: 79
End: 2019-07-01
Payer: MEDICARE

## 2019-07-01 VITALS
DIASTOLIC BLOOD PRESSURE: 88 MMHG | HEART RATE: 62 BPM | BODY MASS INDEX: 49.76 KG/M2 | SYSTOLIC BLOOD PRESSURE: 144 MMHG | TEMPERATURE: 98.3 F | OXYGEN SATURATION: 93 % | HEIGHT: 55 IN | WEIGHT: 215 LBS

## 2019-07-01 DIAGNOSIS — I10 ESSENTIAL HYPERTENSION: Chronic | ICD-10-CM

## 2019-07-01 DIAGNOSIS — Z78.0 ASYMPTOMATIC POSTMENOPAUSAL STATE: ICD-10-CM

## 2019-07-01 DIAGNOSIS — R06.02 SHORTNESS OF BREATH: ICD-10-CM

## 2019-07-01 DIAGNOSIS — Z12.31 ENCOUNTER FOR SCREENING MAMMOGRAM FOR BREAST CANCER: ICD-10-CM

## 2019-07-01 DIAGNOSIS — H65.195 OTHER RECURRENT ACUTE NONSUPPURATIVE OTITIS MEDIA OF LEFT EAR: ICD-10-CM

## 2019-07-01 DIAGNOSIS — K21.00 GASTROESOPHAGEAL REFLUX DISEASE WITH ESOPHAGITIS: ICD-10-CM

## 2019-07-01 DIAGNOSIS — Z23 ENCOUNTER FOR IMMUNIZATION: ICD-10-CM

## 2019-07-01 DIAGNOSIS — D44.7: ICD-10-CM

## 2019-07-01 DIAGNOSIS — Z00.00 MEDICARE ANNUAL WELLNESS VISIT, SUBSEQUENT: Primary | ICD-10-CM

## 2019-07-01 PROCEDURE — 99214 OFFICE O/P EST MOD 30 MIN: CPT | Performed by: FAMILY MEDICINE

## 2019-07-01 PROCEDURE — 90732 PPSV23 VACC 2 YRS+ SUBQ/IM: CPT

## 2019-07-01 PROCEDURE — G0439 PPPS, SUBSEQ VISIT: HCPCS | Performed by: FAMILY MEDICINE

## 2019-07-01 PROCEDURE — G0009 ADMIN PNEUMOCOCCAL VACCINE: HCPCS

## 2019-07-01 RX ORDER — OMEPRAZOLE 40 MG/1
40 CAPSULE, DELAYED RELEASE ORAL 2 TIMES DAILY
Qty: 180 CAPSULE | Refills: 1 | Status: SHIPPED | OUTPATIENT
Start: 2019-07-01 | End: 2019-12-19 | Stop reason: SDUPTHER

## 2019-07-01 RX ORDER — AMOXICILLIN AND CLAVULANATE POTASSIUM 875; 125 MG/1; MG/1
1 TABLET, FILM COATED ORAL EVERY 12 HOURS SCHEDULED
Qty: 20 TABLET | Refills: 0 | Status: SHIPPED | OUTPATIENT
Start: 2019-07-01 | End: 2019-07-11

## 2019-07-01 NOTE — PATIENT INSTRUCTIONS
Discussed all with patient  You will need to schedule your mammo and your DEXA scan at [facility]  Read over the information on advanced directives  Pneumovax shot was given today  I am also calling in an antibiotic as your ear looks like it may have an active infection now and I want you cleared for surgery so take the antibiotic twice daily as directed  Proceed with the surgery we will try to get a copy of the CT scan that shows the glomus  Obesity   AMBULATORY CARE:   Obesity  is when your body mass index (BMI) is greater than 30  Your healthcare provider will use your height and weight to measure your BMI  The risks of obesity include  many health problems, such as injuries or physical disability  You may need tests to check for the following:  · Diabetes     · High blood pressure or high cholesterol     · Heart disease     · Gallbladder or liver disease     · Cancer of the colon, breast, prostate, liver, or kidney     · Sleep apnea     · Arthritis or gout  Seek care immediately if:   · You have a severe headache, confusion, or difficulty speaking  · You have weakness on one side of your body  · You have chest pain, sweating, or shortness of breath  Contact your healthcare provider if:   · You have symptoms of gallbladder or liver disease, such as pain in your upper abdomen  · You have knee or hip pain and discomfort while walking  · You have symptoms of diabetes, such as intense hunger and thirst, and frequent urination  · You have symptoms of sleep apnea, such as snoring or daytime sleepiness  · You have questions or concerns about your condition or care  Treatment for obesity  focuses on helping you lose weight to improve your health  Even a small decrease in BMI can reduce the risk for many health problems  Your healthcare provider will help you set a weight-loss goal   · Lifestyle changes  are the first step in treating obesity   These include making healthy food choices and getting regular physical activity  Your healthcare provider may suggest a weight-loss program that involves coaching, education, and therapy  · Medicine  may help you lose weight when it is used with a healthy diet and physical activity  · Surgery  can help you lose weight if you are very obese and have other health problems  There are several types of weight-loss surgery  Ask your healthcare provider for more information  Be successful losing weight:   · Set small, realistic goals  An example of a small goal is to walk for 20 minutes 5 days a week  Anther goal is to lose 5% of your body weight  · Tell friends, family members, and coworkers about your goals  and ask for their support  Ask a friend to lose weight with you, or join a weight-loss support group  · Identify foods or triggers that may cause you to overeat , and find ways to avoid them  Remove tempting high-calorie foods from your home and workplace  Place a bowl of fresh fruit on your kitchen counter  If stress causes you to eat, then find other ways to cope with stress  · Keep a diary to track what you eat and drink  Also write down how many minutes of physical activity you do each day  Weigh yourself once a week and record it in your diary  Eating changes: You will need to eat 500 to 1,000 fewer calories each day than you currently eat to lose 1 to 2 pounds a week  The following changes will help you cut calories:  · Eat smaller portions  Use small plates, no larger than 9 inches in diameter  Fill your plate half full of fruits and vegetables  Measure your food using measuring cups until you know what a serving size looks like  · Eat 3 meals and 1 or 2 snacks each day  Plan your meals in advance  Monique Stalls and eat at home most of the time  Eat slowly  · Eat fruits and vegetables at every meal   They are low in calories and high in fiber, which makes you feel full   Do not add butter, margarine, or cream sauce to vegetables  Use herbs to season steamed vegetables  · Eat less fat and fewer fried foods  Eat more baked or grilled chicken and fish  These protein sources are lower in calories and fat than red meat  Limit fast food  Dress your salads with olive oil and vinegar instead of bottled dressing  · Limit the amount of sugar you eat  Do not drink sugary beverages  Limit alcohol  Activity changes:  Physical activity is good for your body in many ways  It helps you burn calories and build strong muscles  It decreases stress and depression, and improves your mood  It can also help you sleep better  Talk to your healthcare provider before you begin an exercise program   · Exercise for at least 30 minutes 5 days a week  Start slowly  Set aside time each day for physical activity that you enjoy and that is convenient for you  It is best to do both weight training and an activity that increases your heart rate, such as walking, bicycling, or swimming  · Find ways to be more active  Do yard work and housecleaning  Walk up the stairs instead of using elevators  Spend your leisure time going to events that require walking, such as outdoor festivals or fairs  This extra physical activity can help you lose weight and keep it off  Follow up with your healthcare provider as directed: You may need to meet with a dietitian  Write down your questions so you remember to ask them during your visits  © 2017 [address] Information is for End User's use only and may not be sold, redistributed or otherwise used for commercial purposes  All illustrations and images included in CareNotes® are the copyrighted property of A D A M [employer] Inc  or Mitchel Anthony  The above information is an  only  It is not intended as medical advice for individual conditions or treatments   Talk to your doctor, nurse or pharmacist before following any medical regimen to see if it is safe and effective for you   Urinary Incontinence   WHAT YOU NEED TO KNOW:   What is urinary incontinence? Urinary incontinence (UI) is when you lose control of your bladder  What causes UI? UI occurs because your bladder cannot store or empty urine properly  The following are the most common types of UI:  · Stress incontinence  is when you leak urine due to increased bladder pressure  This may happen when you cough, sneeze, or exercise  · Urge incontinence  is when you feel the need to urinate right away and leak urine accidentally  · Mixed incontinence  is when you have both stress and urge UI  What are the signs and symptoms of UI?   · You feel like your bladder does not empty completely when you urinate  · You urinate often and need to urinate immediately  · You leak urine when you sleep, or you wake up with the urge to urinate  · You leak urine when you cough, sneeze, exercise, or laugh  How is UI diagnosed? Your healthcare provider will ask how often you leak urine and whether you have stress or urge symptoms  Tell him which medicines you take, how often you urinate, and how much liquid you drink each day  You may need any of the following tests:  · Urine tests  may show infection or kidney function  · A pelvic exam  may be done to check for blockages  A pelvic exam will also show if your bladder, uterus, or other organs have moved out of place  · An x-ray, ultrasound, or CT  may show problems with parts of your urinary system  You may be given contrast liquid to help your organs show up better in the pictures  Tell the healthcare provider if you have ever had an allergic reaction to contrast liquid  Do not enter the MRI room with anything metal  Metal can cause serious injury  Tell the healthcare provider if you have any metal in or on your body  · A bladder scan  will show how much urine is left in your bladder after you urinate   You will be asked to urinate and then healthcare providers will use a small ultrasound machine to check the urine left in your bladder  · Cystometry  is used to check the function of your urinary system  Your healthcare provider checks the pressure in your bladder while filling it with fluid  Your bladder pressure may also be tested when your bladder is full and while you urinate  How is UI treated? · Medicines  can help strengthen your bladder control  · Electrical stimulation  is used to send a small amount of electrical energy to your pelvic floor muscles  This helps control your bladder function  Electrodes may be placed outside your body or in your rectum  For women, the electrodes may be placed in the vagina  · A bulking agent  may be injected into the wall of your urethra to make it thicker  This helps keep your urethra closed and decreases urine leakage  · Devices  such as a clamp, pessary, or tampon may help stop urine leaks  Ask your healthcare provider for more information about these and other devices  · Surgery  may be needed if other treatments do not work  Several types of surgery can help improve your bladder control  Ask your healthcare provider for more information about the surgery you may need  How can I manage my symptoms? · Do pelvic muscle exercises often  Your pelvic muscles help you stop urinating  Squeeze these muscles tight for 5 seconds, then relax for 5 seconds  Gradually work up to squeezing for 10 seconds  Do 3 sets of 15 repetitions a day, or as directed  This will help strengthen your pelvic muscles and improve bladder control  · A catheter  may be used to help empty your bladder  A catheter is a tiny, plastic tube that is put into your bladder to drain your urine  Your healthcare provider may tell you to use a catheter to prevent your bladder from getting too full and leaking urine  · Keep a UI record  Write down how often you leak urine and how much you leak   Make a note of what you were doing when you leaked urine     · Train your bladder  Go to the bathroom at set times, such as every 2 hours, even if you do not feel the urge to go  You can also try to hold your urine when you feel the urge to go  For example, hold your urine for 5 minutes when you feel the urge to go  As that becomes easier, hold your urine for 10 minutes  · Drink liquids as directed  Ask your healthcare provider how much liquid to drink each day and which liquids are best for you  You may need to limit the amount of liquid you drink to help control your urine leakage  Limit or do not have drinks that contain caffeine or alcohol  Do not drink any liquid right before you go to bed  · Prevent constipation  Eat a variety of high-fiber foods  Good examples are high-fiber cereals, beans, vegetables, and whole-grain breads  Prune juice may help make your bowel movement softer  Walking is the best way to trigger your intestines to have a bowel movement  · Exercise regularly and maintain a healthy weight  Ask your healthcare provider how much you should weigh and about the best exercise plan for you  Weight loss and exercise will decrease pressure on your bladder and help you control your leakage  Ask him to help you create a weight loss plan if you are overweight  When should I seek immediate care? · You have severe pain  · You are confused or cannot think clearly  When should I contact my healthcare provider? · You have a fever  · You see blood in your urine  · You have pain when you urinate  · You have new or worse pain, even after treatment  · Your mouth feels dry or you have vision changes  · Your urine is cloudy or smells bad  · You have questions or concerns about your condition or care  CARE AGREEMENT:   You have the right to help plan your care  Learn about your health condition and how it may be treated  Discuss treatment options with your caregivers to decide what care you want to receive   You always have the right to refuse treatment  The above information is an  only  It is not intended as medical advice for individual conditions or treatments  Talk to your doctor, nurse or pharmacist before following any medical regimen to see if it is safe and effective for you  © 2017 [address] Information is for End User's use only and may not be sold, redistributed or otherwise used for commercial purposes  All illustrations and images included in CareNotes® are the copyrighted property of [employer]  or Mitchel Anthony  Cigarette Smoking and Your Health   AMBULATORY CARE:   Risks to your health if you smoke:  Nicotine and other chemicals found in tobacco damage every cell in your body  Even if you are a light smoker, you have an increased risk for cancer, heart disease, and lung disease  If you are pregnant or have diabetes, smoking increases your risk for complications  Benefits to your health if you stop smoking:   · You decrease respiratory symptoms such as coughing, wheezing, and shortness of breath  · You reduce your risk for cancers of the lung, mouth, throat, kidney, bladder, pancreas, stomach, and cervix  If you already have cancer, you increase the benefits of chemotherapy  You also reduce your risk for cancer returning or a second cancer from developing  · You reduce your risk for heart disease, blood clots, heart attack, and stroke  · You reduce your risk for lung infections, and diseases such as pneumonia, asthma, chronic bronchitis, and emphysema  · Your circulation improves  More oxygen can be delivered to your body  If you have diabetes, you lower your risk for complications, such as kidney, artery, and eye diseases  You also lower your risk for nerve damage  Nerve damage can lead to amputations, poor vision, and blindness  · You improve your body's ability to heal and to fight infections    Benefits to the health of others if you stop smoking:  Tobacco is harmful to nonsmokers who breathe in your secondhand smoke  The following are ways the health of others around you may improve when you stop smoking:  · You lower the risks for lung cancer and heart disease in nonsmoking adults  · If you are pregnant, you lower the risk for miscarriage, early delivery, low birth weight, and stillbirth  You also lower your baby's risk for SIDS, obesity, developmental delay, and neurobehavioral problems, such as ADHD  · If you have children, you lower their risk for ear infections, colds, pneumonia, bronchitis, and asthma  For more information and support to stop smoking:   · [employer]  gov  Phone: 0- 033 - 921-1423  Web Address: www [employer]  Follow up with your healthcare provider as directed:  Write down your questions so you remember to ask them during your visits  © 2017 [address] Information is for End User's use only and may not be sold, redistributed or otherwise used for commercial purposes  All illustrations and images included in CareNotes® are the copyrighted property of [employer] Inc  or Mitchel Anthony  The above information is an  only  It is not intended as medical advice for individual conditions or treatments  Talk to your doctor, nurse or pharmacist before following any medical regimen to see if it is safe and effective for you  Fall Prevention   AMBULATORY CARE:   Fall prevention  includes ways to make your home and other areas safer  It also includes ways you can move more carefully to prevent a fall  Health conditions that cause changes in your blood pressure, vision, or muscle strength and coordination may increase your risk for falls  Medicines may also increase your risk for falls if they make you dizzy, weak, or sleepy  Call 911 or have someone else call if:   · You have fallen and are unconscious  · You have fallen and cannot move part of your body    Contact your healthcare provider if:   · You have fallen and have pain or a headache  · You have questions or concerns about your condition or care  Fall prevention tips:   · Stand or sit up slowly  This may help you keep your balance and prevent falls  · Use assistive devices as directed  Your healthcare provider may suggest that you use a cane or walker to help you keep your balance  You may need to have grab bars put in your bathroom near the toilet or in the shower  · Wear shoes that fit well and have soles that   Wear shoes both inside and outside  Use slippers with good   Do not wear shoes with high heels  · Wear a personal alarm  This is a device that allows you to call 911 if you fall and need help  Ask your healthcare provider for more information  · Stay active  Exercise can help strengthen your muscles and improve your balance  Your healthcare provider may recommend water aerobics or walking  He or she may also recommend physical therapy to improve your coordination  Never start an exercise program without talking to your healthcare provider first      · Manage your medical conditions  Keep all appointments with your healthcare providers  Visit your eye doctor as directed  Home safety tips:   · Add items to prevent falls in the bathroom  Put nonslip strips on your bath or shower floor to prevent you from slipping  Use a bath mat if you do not have carpet in the bathroom  This will prevent you from falling when you step out of the bath or shower  Use a shower seat so you do not need to stand while you shower  Sit on the toilet or a chair in your bathroom to dry yourself and put on clothing  This will prevent you from losing your balance from drying or dressing yourself while you are standing  · Keep paths clear  Remove books, shoes, and other objects from walkways and stairs  Place cords for telephones and lamps out of the way so that you do not need to walk over them   Tape them down if you cannot move them  Remove small rugs  If you cannot remove a rug, secure it with double-sided tape  This will prevent you from tripping  · Install bright lights in your home  Use night lights to help light paths to the bathroom or kitchen  Always turn on the light before you start walking  · Keep items you use often on shelves within reach  Do not use a step stool to help you reach an item  · Paint or place reflective tape on the edges of your stairs  This will help you see the stairs better  Follow up with your healthcare provider as directed:  Write down your questions so you remember to ask them during your visits  © 2017 [address] Information is for End User's use only and may not be sold, redistributed or otherwise used for commercial purposes  All illustrations and images included in CareNotes® are the copyrighted property of [employer] M , Inc  or Mitchel Anthony  The above information is an  only  It is not intended as medical advice for individual conditions or treatments  Talk to your doctor, nurse or pharmacist before following any medical regimen to see if it is safe and effective for you  Advance Directives   WHAT YOU NEED TO KNOW:   What are advance directives? Advance directives are legal documents that state your wishes and plans for medical care  These plans are made ahead of time in case you lose your ability to make decisions for yourself  Advance directives can apply to any medical decision, such as the treatments you want, and if you want to donate organs  What are the types of advance directives? There are many types of advance directives, and each state has rules about how to use them  You may choose a combination of any of the following:  · Living will: This is a written record of the treatment you want  You can also choose which treatments you do not want, which to limit, and which to stop at a certain time   This includes surgery, medicine, IV fluid, and tube feedings  · Durable power of  for healthcare [facility]): This is a written record that states who you want to make healthcare choices for you when you are unable to make them for yourself  This person, called a proxy, is usually a family member or a friend  You may choose more than 1 proxy  · Do not resuscitate (DNR) order:  A DNR order is used in case your heart stops beating or you stop breathing  It is a request not to have certain forms of treatment, such as CPR  A DNR order may be included in other types of advance directives  · Medical directive: This covers the care that you want if you are in a coma, near death, or unable to make decisions for yourself  You can list the treatments you want for each condition  Treatment may include pain medicine, surgery, blood transfusions, dialysis, IV or tube feedings, and a ventilator (breathing machine)  · Values history: This document has questions about your views, beliefs, and how you feel and think about life  This information can help others choose the care that you would choose  Why are advance directives important? An advance directive helps you control your care  Although spoken wishes may be used, it is better to have your wishes written down  Spoken wishes can be misunderstood, or not followed  Treatments may be given even if you do not want them  An advance directive may make it easier for your family to make difficult choices about your care  How do I decide what to put in my advance directives? · Make informed decisions:  Make sure you fully understand treatments or care you may receive  Think about the benefits and problems your decisions could cause for you or your family  Talk to healthcare providers if you have concerns or questions before you write down your wishes  You may also want to talk with your [employer]ism or , or a    Check your state laws to make sure that what you put in your advance directive is legal      · Sign all forms:  Sign and date your advance directive when you have finished  You may also need 2 witnesses to sign the forms  Witnesses cannot be your doctor or his staff, your spouse, heirs or beneficiaries, people you owe money to, or your chosen proxy  Talk to your family, proxy, and healthcare providers about your advance directive  Give each person a copy, and keep one for yourself in a place you can get to easily  Do not keep it hidden or locked away  · Review and revise your plans: You can revise your advance directive at any time, as long as you are able to make decisions  Review your plan every year, and when there are changes in your life, or your health  When you make changes, let your family, proxy, and healthcare providers know  Give each a new copy  Where can I find more information? · [employer]  [address] , [place]  Phone: 9- [phone]  Phone: 4- [phone]  Web Address: http://www  aafp org  · [address] [facility])  [address] [facility], [address] [facility] , [address] [place] [address]  Phone: [phone]  Phone: [phone]  Web Address: Baudilio leal  CARE AGREEMENT:   You have the right to help plan your care  To help with this plan, you must learn about your health condition and treatment options  You must also learn about advance directives and how they are used  Work with your healthcare providers to decide what care will be used to treat you  You always have the right to refuse treatment  The above information is an  only  It is not intended as medical advice for individual conditions or treatments  Talk to your doctor, nurse or pharmacist before following any medical regimen to see if it is safe and effective for you    © 2017 [address] Information is for End User's use only and may not be sold, redistributed or otherwise used for commercial purposes  All illustrations and images included in CareNotes® are the copyrighted property of [employer]  or Mitchel Anthony  Obesity   AMBULATORY CARE:   Obesity  is when your body mass index (BMI) is greater than 30  Your healthcare provider will use your height and weight to measure your BMI  The risks of obesity include  many health problems, such as injuries or physical disability  You may need tests to check for the following:  · Diabetes     · High blood pressure or high cholesterol     · Heart disease     · Gallbladder or liver disease     · Cancer of the colon, breast, prostate, liver, or kidney     · Sleep apnea     · Arthritis or gout  Seek care immediately if:   · You have a severe headache, confusion, or difficulty speaking  · You have weakness on one side of your body  · You have chest pain, sweating, or shortness of breath  Contact your healthcare provider if:   · You have symptoms of gallbladder or liver disease, such as pain in your upper abdomen  · You have knee or hip pain and discomfort while walking  · You have symptoms of diabetes, such as intense hunger and thirst, and frequent urination  · You have symptoms of sleep apnea, such as snoring or daytime sleepiness  · You have questions or concerns about your condition or care  Treatment for obesity  focuses on helping you lose weight to improve your health  Even a small decrease in BMI can reduce the risk for many health problems  Your healthcare provider will help you set a weight-loss goal   · Lifestyle changes  are the first step in treating obesity  These include making healthy food choices and getting regular physical activity  Your healthcare provider may suggest a weight-loss program that involves coaching, education, and therapy  · Medicine  may help you lose weight when it is used with a healthy diet and physical activity       · Surgery  can help you lose weight if you are very obese and have other health problems  There are several types of weight-loss surgery  Ask your healthcare provider for more information  Be successful losing weight:   · Set small, realistic goals  An example of a small goal is to walk for 20 minutes 5 days a week  Haim goal is to lose 5% of your body weight  · Tell friends, family members, and coworkers about your goals  and ask for their support  Ask a friend to lose weight with you, or join a weight-loss support group  · Identify foods or triggers that may cause you to overeat , and find ways to avoid them  Remove tempting high-calorie foods from your home and workplace  Place a bowl of fresh fruit on your kitchen counter  If stress causes you to eat, then find other ways to cope with stress  · Keep a diary to track what you eat and drink  Also write down how many minutes of physical activity you do each day  Weigh yourself once a week and record it in your diary  Eating changes: You will need to eat 500 to 1,000 fewer calories each day than you currently eat to lose 1 to 2 pounds a week  The following changes will help you cut calories:  · Eat smaller portions  Use small plates, no larger than 9 inches in diameter  Fill your plate half full of fruits and vegetables  Measure your food using measuring cups until you know what a serving size looks like  · Eat 3 meals and 1 or 2 snacks each day  Plan your meals in advance  [facility] and eat at home most of the time  Eat slowly  · Eat fruits and vegetables at every meal   They are low in calories and high in fiber, which makes you feel full  Do not add butter, margarine, or cream sauce to vegetables  Use herbs to season steamed vegetables  · Eat less fat and fewer fried foods  Eat more baked or grilled chicken and fish  These protein sources are lower in calories and fat than red meat  Limit fast food   Dress your salads with olive oil and vinegar instead of bottled dressing  · Limit the amount of sugar you eat  Do not drink sugary beverages  Limit alcohol  Activity changes:  Physical activity is good for your body in many ways  It helps you burn calories and build strong muscles  It decreases stress and depression, and improves your mood  It can also help you sleep better  Talk to your healthcare provider before you begin an exercise program   · Exercise for at least 30 minutes 5 days a week  Start slowly  Set aside time each day for physical activity that you enjoy and that is convenient for you  It is best to do both weight training and an activity that increases your heart rate, such as walking, bicycling, or swimming  · Find ways to be more active  Do yard work and housecleaning  Walk up the stairs instead of using elevators  Spend your leisure time going to events that require walking, such as outdoor festivals or fairs  This extra physical activity can help you lose weight and keep it off  Follow up with your healthcare provider as directed: You may need to meet with a dietitian  Write down your questions so you remember to ask them during your visits  © 2017 [address] Information is for End User's use only and may not be sold, redistributed or otherwise used for commercial purposes  All illustrations and images included in CareNotes® are the copyrighted property of A D A M [employer]  or Mitchel Anthony  The above information is an  only  It is not intended as medical advice for individual conditions or treatments  Talk to your doctor, nurse or pharmacist before following any medical regimen to see if it is safe and effective for you  Urinary Incontinence   WHAT YOU NEED TO KNOW:   What is urinary incontinence? Urinary incontinence (UI) is when you lose control of your bladder  What causes UI? UI occurs because your bladder cannot store or empty urine properly   The following are the most common types of UI:  · Stress incontinence  is when you leak urine due to increased bladder pressure  This may happen when you cough, sneeze, or exercise  · Urge incontinence  is when you feel the need to urinate right away and leak urine accidentally  · Mixed incontinence  is when you have both stress and urge UI  What are the signs and symptoms of UI?   · You feel like your bladder does not empty completely when you urinate  · You urinate often and need to urinate immediately  · You leak urine when you sleep, or you wake up with the urge to urinate  · You leak urine when you cough, sneeze, exercise, or laugh  How is UI diagnosed? Your healthcare provider will ask how often you leak urine and whether you have stress or urge symptoms  Tell him which medicines you take, how often you urinate, and how much liquid you drink each day  You may need any of the following tests:  · Urine tests  may show infection or kidney function  · A pelvic exam  may be done to check for blockages  A pelvic exam will also show if your bladder, uterus, or other organs have moved out of place  · An x-ray, ultrasound, or CT  may show problems with parts of your urinary system  You may be given contrast liquid to help your organs show up better in the pictures  Tell the healthcare provider if you have ever had an allergic reaction to contrast liquid  Do not enter the MRI room with anything metal  Metal can cause serious injury  Tell the healthcare provider if you have any metal in or on your body  · A bladder scan  will show how much urine is left in your bladder after you urinate  You will be asked to urinate and then healthcare providers will use a small ultrasound machine to check the urine left in your bladder  · Cystometry  is used to check the function of your urinary system  Your healthcare provider checks the pressure in your bladder while filling it with fluid   Your bladder pressure may also be tested when your bladder is full and while you urinate  How is UI treated? · Medicines  can help strengthen your bladder control  · Electrical stimulation  is used to send a small amount of electrical energy to your pelvic floor muscles  This helps control your bladder function  Electrodes may be placed outside your body or in your rectum  For women, the electrodes may be placed in the vagina  · A bulking agent  may be injected into the wall of your urethra to make it thicker  This helps keep your urethra closed and decreases urine leakage  · Devices  such as a clamp, pessary, or tampon may help stop urine leaks  Ask your healthcare provider for more information about these and other devices  · Surgery  may be needed if other treatments do not work  Several types of surgery can help improve your bladder control  Ask your healthcare provider for more information about the surgery you may need  How can I manage my symptoms? · Do pelvic muscle exercises often  Your pelvic muscles help you stop urinating  Squeeze these muscles tight for 5 seconds, then relax for 5 seconds  Gradually work up to squeezing for 10 seconds  Do 3 sets of 15 repetitions a day, or as directed  This will help strengthen your pelvic muscles and improve bladder control  · A catheter  may be used to help empty your bladder  A catheter is a tiny, plastic tube that is put into your bladder to drain your urine  Your healthcare provider may tell you to use a catheter to prevent your bladder from getting too full and leaking urine  · Keep a UI record  Write down how often you leak urine and how much you leak  Make a note of what you were doing when you leaked urine  · Train your bladder  Go to the bathroom at set times, such as every 2 hours, even if you do not feel the urge to go  You can also try to hold your urine when you feel the urge to go  For example, hold your urine for 5 minutes when you feel the urge to go   As that becomes easier, hold your urine for 10 minutes  · Drink liquids as directed  Ask your healthcare provider how much liquid to drink each day and which liquids are best for you  You may need to limit the amount of liquid you drink to help control your urine leakage  Limit or do not have drinks that contain caffeine or alcohol  Do not drink any liquid right before you go to bed  · Prevent constipation  Eat a variety of high-fiber foods  Good examples are high-fiber cereals, beans, vegetables, and whole-grain breads  Prune juice may help make your bowel movement softer  Walking is the best way to trigger your intestines to have a bowel movement  · Exercise regularly and maintain a healthy weight  Ask your healthcare provider how much you should weigh and about the best exercise plan for you  Weight loss and exercise will decrease pressure on your bladder and help you control your leakage  Ask him to help you create a weight loss plan if you are overweight  When should I seek immediate care? · You have severe pain  · You are confused or cannot think clearly  When should I contact my healthcare provider? · You have a fever  · You see blood in your urine  · You have pain when you urinate  · You have new or worse pain, even after treatment  · Your mouth feels dry or you have vision changes  · Your urine is cloudy or smells bad  · You have questions or concerns about your condition or care  CARE AGREEMENT:   You have the right to help plan your care  Learn about your health condition and how it may be treated  Discuss treatment options with your caregivers to decide what care you want to receive  You always have the right to refuse treatment  The above information is an  only  It is not intended as medical advice for individual conditions or treatments  Talk to your doctor, nurse or pharmacist before following any medical regimen to see if it is safe and effective for you    © 2017 [address] [facility] Information is for End User's use only and may not be sold, redistributed or otherwise used for commercial purposes  All illustrations and images included in CareNotes® are the copyrighted property of A D A M , Inc  or Mitchel Anthony  Cigarette Smoking and Your Health   AMBULATORY CARE:   Risks to your health if you smoke:  Nicotine and other chemicals found in tobacco damage every cell in your body  Even if you are a light smoker, you have an increased risk for cancer, heart disease, and lung disease  If you are pregnant or have diabetes, smoking increases your risk for complications  Benefits to your health if you stop smoking:   · You decrease respiratory symptoms such as coughing, wheezing, and shortness of breath  · You reduce your risk for cancers of the lung, mouth, throat, kidney, bladder, pancreas, stomach, and cervix  If you already have cancer, you increase the benefits of chemotherapy  You also reduce your risk for cancer returning or a second cancer from developing  · You reduce your risk for heart disease, blood clots, heart attack, and stroke  · You reduce your risk for lung infections, and diseases such as pneumonia, asthma, chronic bronchitis, and emphysema  · Your circulation improves  More oxygen can be delivered to your body  If you have diabetes, you lower your risk for complications, such as kidney, artery, and eye diseases  You also lower your risk for nerve damage  Nerve damage can lead to amputations, poor vision, and blindness  · You improve your body's ability to heal and to fight infections  Benefits to the health of others if you stop smoking:  Tobacco is harmful to nonsmokers who breathe in your secondhand smoke  The following are ways the health of others around you may improve when you stop smoking:  · You lower the risks for lung cancer and heart disease in nonsmoking adults       · If you are pregnant, you lower the risk for miscarriage, early delivery, low birth weight, and stillbirth  You also lower your baby's risk for SIDS, obesity, developmental delay, and neurobehavioral problems, such as ADHD  · If you have children, you lower their risk for ear infections, colds, pneumonia, bronchitis, and asthma  For more information and support to stop smoking:   · Smokefree  gov  Phone: 0- 807 - 217-0752  Web Address: www [employer]  Follow up with your healthcare provider as directed:  Write down your questions so you remember to ask them during your visits  © 2017 [address] Information is for End User's use only and may not be sold, redistributed or otherwise used for commercial purposes  All illustrations and images included in CareNotes® are the copyrighted property of [employer]  or Mitchel Anthony  The above information is an  only  It is not intended as medical advice for individual conditions or treatments  Talk to your doctor, nurse or pharmacist before following any medical regimen to see if it is safe and effective for you  Fall Prevention   AMBULATORY CARE:   Fall prevention  includes ways to make your home and other areas safer  It also includes ways you can move more carefully to prevent a fall  Health conditions that cause changes in your blood pressure, vision, or muscle strength and coordination may increase your risk for falls  Medicines may also increase your risk for falls if they make you dizzy, weak, or sleepy  Call 911 or have someone else call if:   · You have fallen and are unconscious  · You have fallen and cannot move part of your body  Contact your healthcare provider if:   · You have fallen and have pain or a headache  · You have questions or concerns about your condition or care  Fall prevention tips:   · Stand or sit up slowly  This may help you keep your balance and prevent falls  · Use assistive devices as directed    Your healthcare provider may suggest that you use a cane or walker to help you keep your balance  You may need to have grab bars put in your bathroom near the toilet or in the shower  · Wear shoes that fit well and have soles that   Wear shoes both inside and outside  Use slippers with good   Do not wear shoes with high heels  · Wear a personal alarm  This is a device that allows you to call 911 if you fall and need help  Ask your healthcare provider for more information  · Stay active  Exercise can help strengthen your muscles and improve your balance  Your healthcare provider may recommend water aerobics or walking  He or she may also recommend physical therapy to improve your coordination  Never start an exercise program without talking to your healthcare provider first      · Manage your medical conditions  Keep all appointments with your healthcare providers  Visit your eye doctor as directed  Home safety tips:   · Add items to prevent falls in the bathroom  Put nonslip strips on your bath or shower floor to prevent you from slipping  Use a bath mat if you do not have carpet in the bathroom  This will prevent you from falling when you step out of the bath or shower  Use a shower seat so you do not need to stand while you shower  Sit on the toilet or a chair in your bathroom to dry yourself and put on clothing  This will prevent you from losing your balance from drying or dressing yourself while you are standing  · Keep paths clear  Remove books, shoes, and other objects from walkways and stairs  Place cords for telephones and lamps out of the way so that you do not need to walk over them  Tape them down if you cannot move them  Remove small rugs  If you cannot remove a rug, secure it with double-sided tape  This will prevent you from tripping  · Install bright lights in your home  Use night lights to help light paths to the bathroom or kitchen   Always turn on the light before you start walking  · Keep items you use often on shelves within reach  Do not use a step stool to help you reach an item  · Paint or place reflective tape on the edges of your stairs  This will help you see the stairs better  Follow up with your healthcare provider as directed:  Write down your questions so you remember to ask them during your visits  © 2017 [address] Information is for End User's use only and may not be sold, redistributed or otherwise used for commercial purposes  All illustrations and images included in CareNotes® are the copyrighted property of [employer] M [employer]  or Mitchel Anthony  The above information is an  only  It is not intended as medical advice for individual conditions or treatments  Talk to your doctor, nurse or pharmacist before following any medical regimen to see if it is safe and effective for you  Advance Directives   WHAT YOU NEED TO KNOW:   What are advance directives? Advance directives are legal documents that state your wishes and plans for medical care  These plans are made ahead of time in case you lose your ability to make decisions for yourself  Advance directives can apply to any medical decision, such as the treatments you want, and if you want to donate organs  What are the types of advance directives? There are many types of advance directives, and each state has rules about how to use them  You may choose a combination of any of the following:  · Living will: This is a written record of the treatment you want  You can also choose which treatments you do not want, which to limit, and which to stop at a certain time  This includes surgery, medicine, IV fluid, and tube feedings  · Durable power of  for healthcare [facility]): This is a written record that states who you want to make healthcare choices for you when you are unable to make them for yourself   This person, called a proxy, is usually a family member or a friend  You may choose more than 1 proxy  · Do not resuscitate (DNR) order:  A DNR order is used in case your heart stops beating or you stop breathing  It is a request not to have certain forms of treatment, such as CPR  A DNR order may be included in other types of advance directives  · Medical directive: This covers the care that you want if you are in a coma, near death, or unable to make decisions for yourself  You can list the treatments you want for each condition  Treatment may include pain medicine, surgery, blood transfusions, dialysis, IV or tube feedings, and a ventilator (breathing machine)  · Values history: This document has questions about your views, beliefs, and how you feel and think about life  This information can help others choose the care that you would choose  Why are advance directives important? An advance directive helps you control your care  Although spoken wishes may be used, it is better to have your wishes written down  Spoken wishes can be misunderstood, or not followed  Treatments may be given even if you do not want them  An advance directive may make it easier for your family to make difficult choices about your care  How do I decide what to put in my advance directives? · Make informed decisions:  Make sure you fully understand treatments or care you may receive  Think about the benefits and problems your decisions could cause for you or your family  Talk to healthcare providers if you have concerns or questions before you write down your wishes  You may also want to talk with your Judaism or , or a   Check your state laws to make sure that what you put in your advance directive is legal      · Sign all forms:  Sign and date your advance directive when you have finished  You may also need 2 witnesses to sign the forms   Witnesses cannot be your doctor or his staff, your spouse, heirs or beneficiaries, people you owe money to, or your chosen proxy  Talk to your family, proxy, and healthcare providers about your advance directive  Give each person a copy, and keep one for yourself in a place you can get to easily  Do not keep it hidden or locked away  · Review and revise your plans: You can revise your advance directive at any time, as long as you are able to make decisions  Review your plan every year, and when there are changes in your life, or your health  When you make changes, let your family, proxy, and healthcare providers know  Give each a new copy  Where can I find more information? · [employer]  [address] [place] , [address]  Phone: 2- 934 - [phone]  Phone: 7- 130 - [phone]  Web Address: http://www  aafp org  · [address] [facility])  [address] [facility], [address] [facility] , [address] [place] [address]  Phone: 2- [phone] - [phone]  Phone: [phone]  Web Address: [employer] leal  CARE AGREEMENT:   You have the right to help plan your care  To help with this plan, you must learn about your health condition and treatment options  You must also learn about advance directives and how they are used  Work with your healthcare providers to decide what care will be used to treat you  You always have the right to refuse treatment  The above information is an  only  It is not intended as medical advice for individual conditions or treatments  Talk to your doctor, nurse or pharmacist before following any medical regimen to see if it is safe and effective for you  © 2017 [address]  Information is for End User's use only and may not be sold, redistributed or otherwise used for commercial purposes  All illustrations and images included in CareNotes® are the copyrighted property of [employer]  or Mitchel Anthony

## 2019-07-01 NOTE — PROGRESS NOTES
Assessment/Plan:     Chronic Problems:  Shortness of breath  Persistent but better s/p tavr  Depression with anxiety  Stay on the current meds  Hypertension  Advised to monitor the bp's at home and call me with the numbers prior to her surgery  Visit Diagnosis:  Diagnoses and all orders for this visit:    Medicare annual wellness visit, subsequent    Encounter for screening mammogram for breast cancer  -     Mammo screening bilateral w 3d & cad; Future    Asymptomatic postmenopausal state  -     DXA bone density spine hip and pelvis; Future    Encounter for immunization  -     PNEUMOCOCCAL POLYSACCHARIDE VACCINE 23-VALENT =>1YO SQ IM (Pneumovax)    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[address] )  Comments:  Keep the f/u with your specialist but will treat with abx now  Nothing in the ear  Gastroesophageal reflux disease with esophagitis  -     omeprazole (PriLOSEC) 40 MG capsule; Take 1 capsule (40 mg total) by mouth 2 (two) times a day    Shortness of breath    Essential hypertension    Other recurrent acute nonsuppurative otitis media of left ear  -     amoxicillin-clavulanate (AUGMENTIN) 875-125 mg per tablet; Take 1 tablet by mouth every 12 (twelve) hours for 10 days          Subjective:    Patient ID: Michel Gomez is a 77 y o  female  Pt is here for routine f/u appt  Still feels her head is clogged up with headaches  Pt is scheduled for tumor removal from behind the ear drum end of July  At [facility] in [place]  Still with sob at times  Needs renewals on omeprazole  No ear pain just feels clogged with a clogged head  No fever at home  No chest pains  Takes all other meds as directed  No side effects noted         The following portions of the patient's history were reviewed and updated as appropriate: allergies, current medications, past family history, past medical history, past social history, past surgical history and problem list     Review of Systems   Constitutional: Positive for fatigue (but not unusual for her)  Negative for chills, diaphoresis and fever  HENT: Positive for hearing loss  Negative for postnasal drip, sinus pressure, sinus pain and sore throat  Ear pain: Feels like the ear is blocked and hears a heart beat in that ear on the left  Respiratory: Positive for shortness of breath  Negative for cough and wheezing  Cardiovascular: Negative for chest pain and palpitations  Gastrointestinal:        Pt has bad reflux  Needs her omeprazole bid  Genitourinary: Negative  Neurological: Positive for headaches  Negative for dizziness and light-headedness  Psychiatric/Behavioral: Positive for dysphoric mood (at times  Worried about the upcoming surgery  )  The patient is not nervous/anxious  Feels the sertraline is helping but still has moments when she is depressed            /88   Pulse 62   Temp 98 3 °F (36 8 °C)   Ht 4' 7" (1 397 m)   Wt 97 5 kg (215 lb)   SpO2 93%   BMI 49 97 kg/m²   Social History     Socioeconomic History    Marital status: Single     Spouse name: Not on file    Number of children: Not on file    Years of education: Not on file    Highest education level: Not on file   Occupational History    Not on file   Social Needs    Financial resource strain: Not on file    Food insecurity:     Worry: Not on file     Inability: Not on file    Transportation needs:     Medical: Not on file     Non-medical: Not on file   Tobacco Use    Smoking status: Never Smoker    Smokeless tobacco: Never Used   Substance and Sexual Activity    Alcohol use: No    Drug use: No    Sexual activity: Never   Lifestyle    Physical activity:     Days per week: Not on file     Minutes per session: Not on file    Stress: Not on file   Relationships    Social connections:     Talks on phone: Not on file     Gets together: Not on file     Attends [employer] service: Not on file     Active member of club or organization: Not on file     Attends meetings of clubs or organizations: Not on file     Relationship status: Not on file    Intimate partner violence:     Fear of current or ex partner: Not on file     Emotionally abused: Not on file     Physically abused: Not on file     Forced sexual activity: Not on file   Other Topics Concern    Not on file   Social History Narrative    Denied drinking coffee    Denied exercise habits     Past Medical History:   Diagnosis Date    Anemia 08/22/2018    Arthritis     AVB (atrioventricular block)     first degree    CHF [facility]congest[facility] failure) [facility] )     COPD, mild ([facility] )     Coronary artery disease     Dislocation of right shoulder joint     Frequent UTI     GERD (gastroesophageal reflux disease)     H/O: pneumonia     Heme positive stool     History of uterine cancer     s/p CALLIE    Hyperlipidemia     Hypertension     Hypothyroidism     Obesity, morbid ([place] [address] ) 08/22/2018    JANICE on CPAP     Pulmonary hypertension ([place] [address] ) 08/22/2018    Severe aortic stenosis     Simple goiter     Skin cyst     within the armpits, right     Family History   Problem Relation Age of Onset    Diabetes Mother     Stroke Mother     Cancer Father     Lung cancer Father     Diabetes Sister     Heart disease Sister     Coronary artery disease Family     Diabetes Family     Hypertension Family     Cancer Family     Stroke Family      Past Surgical History:   Procedure Laterality Date    BREAST BIOPSY      CARDIAC CATHETERIZATION      CARPAL TUNNEL RELEASE Bilateral     CHOLECYSTECTOMY      DILATION AND CURETTAGE OF UTERUS      HYSTEROSCOPY      WY COLONOSCOPY FLX DX W/COLLJ SPEC WHEN PFRMD N/A 9/6/2018    Procedure: COLONOSCOPY;  Surgeon: Jesus Smith MD;  Location: [facility]; Service: Gastroenterology    WY ECHO TRANSESOPHAG R-T 2D W/PRB IMG ACQUISJ I&R N/A 10/9/2018    Procedure: INTRA-OP TRANSESOPHAGEAL ECHOCARDIOGRAM (GARRISON);   Surgeon: Dino Aase, DO;  Location:  MAIN OR;  Service: Cardiac Surgery    NM ESOPHAGOGASTRODUODENOSCOPY TRANSORAL DIAGNOSTIC N/A 8/31/2018    Procedure: ESOPHAGOGASTRODUODENOSCOPY (EGD); Surgeon: Smita Leung MD;  [facility]; Service: Gastroenterology    NM REPLACE AORTIC VALVE OPENFEMORAL ARTERY APPROACH N/A 10/9/2018    Procedure: REPLACEMENT AORTIC VALVE TRANSCATHETER (TAVR) TRANSFEMORAL W/ 23 MM MENDOZA NOE S3 VALVE (ACCESS OF LEFT);   Surgeon: Escobar Marrero DO;  [facility];  Service: Cardiac Surgery    TOTAL ABDOMINAL HYSTERECTOMY      TOTAL HIP ARTHROPLASTY Left 2007    TOTAL KNEE ARTHROPLASTY Bilateral        Current Outpatient Medications:     albuterol (2 5 mg/3 mL) 0 083 % nebulizer solution, Take 1 vial (2 5 mg total) by nebulization every 6 (six) hours as needed for wheezing or shortness of breath, Disp: 360 mL, Rfl: 1    albuterol (PROVENTIL HFA,VENTOLIN HFA) 90 mcg/act inhaler, Inhale 2 puffs every 6 (six) hours as needed for wheezing, Disp: 1 Inhaler, Rfl: 3    aspirin (ECOTRIN LOW STRENGTH) 81 mg EC tablet, Take 1 tablet (81 mg total) by mouth daily, Disp: 100 tablet, Rfl: 0    b complex vitamins capsule, Take 1 capsule by mouth 2 (two) times a day  , Disp: , Rfl:     BREO ELLIPTA 100-25 MCG/INH inhaler, INHALE 1 PUFF DAILY RINSE MOUTH AFTER USE , Disp: 60 each, Rfl: 3    Calcium Carb-Cholecalciferol (CALCIUM 600 + D PO), Take 1 tablet by mouth 2 (two) times a day, Disp: , Rfl:     Fesoterodine Fumarate ER (TOVIAZ) 8 MG TB24, Take 8 mg by mouth daily, Disp: , Rfl:     fluticasone (FLONASE) 50 mcg/act nasal spray, SPRAY 2 SPRAYS INTO EACH NOSTRIL EVERY DAY, Disp: , Rfl: 0    IRON PO, Take by mouth daily, Disp: , Rfl:     levothyroxine 50 mcg tablet, TAKE 1 TABLET (50 MCG TOTAL) BY MOUTH DAILY, Disp: 30 tablet, Rfl: 5    loratadine (CLARITIN) 10 mg tablet, Take 10 mg by mouth daily, Disp: , Rfl:     metoprolol succinate (TOPROL-XL) 25 mg 24 hr tablet, TAKE 1 TABLET BY MOUTH EVERY DAY, Disp: 30 tablet, Rfl: 2   omeprazole (PriLOSEC) 40 MG capsule, Take 1 capsule (40 mg total) by mouth 2 (two) times a day, Disp: 180 capsule, Rfl: 1    sertraline (ZOLOFT) 50 mg tablet, Take 1 5 tablets (75 mg total) by mouth daily, Disp: 45 tablet, Rfl: 3    simvastatin (ZOCOR) 40 mg tablet, Take 1 tablet (40 mg total) by mouth daily at bedtime, Disp: 90 tablet, Rfl: 1    temazepam (RESTORIL) 7 5 mg capsule, Take 1 capsule (7 5 mg total) by mouth daily at bedtime as needed for sleep, Disp: 90 capsule, Rfl: 0    amoxicillin-clavulanate (AUGMENTIN) 875-125 mg per tablet, Take 1 tablet by mouth every 12 (twelve) hours for 10 days, Disp: 20 tablet, Rfl: 0    KLOR-CON 10 10 MEQ tablet, TAKE 1 TABLET (10 MEQ TOTAL) BY MOUTH 2 (TWO) TIMES A DAY (Patient not taking: Reported on 6/11/2019), Disp: 90 tablet, Rfl: 1    Allergies   Allergen Reactions    Latex Rash    Neosporin [Neomycin-Bacitracin Zn-Polymyx] Rash and Other (See Comments)     hives per [facility] order          Lab Review   Appointment on 06/27/2019   Component Date Value    BUN 06/27/2019 20     Glucose, Fasting 06/27/2019 77     Creatinine 06/27/2019 0 93     eGFR 06/27/2019 59    Appointment on 06/27/2019   Component Date Value    Hemoglobin 06/27/2019 13 8     Hematocrit 06/27/2019 44 6         Imaging: Xr Chest Pa & Lateral    Result Date: 7/1/2019  Narrative: CHEST INDICATION:   Z01 [id number]: Encounter for preprocedural respiratory examination  COMPARISON:  October 10, 2018  EXAM PERFORMED/VIEWS:  XR CHEST PA & LATERAL FINDINGS: Heart normal in size  Aortic valve prosthesis  Pulmonary vessels unremarkable  Lungs and pleural spaces clear  No pneumothorax  Postoperative changes in the right shoulder  Imaged bones otherwise unremarkable  Impression: No acute abnormality in the chest  Workstation performed: [id number]       Objective:     Physical Exam   Constitutional: She appears well-developed and well-nourished  No distress     HENT:   Head: Normocephalic and atraumatic  Right Ear: External ear normal    Left tm injected with blood in the canal with clotting  Eyes: Pupils are equal, round, and reactive to light  EOM are normal    Neck: Normal range of motion  Neck supple  Cardiovascular: Normal rate and regular rhythm  Murmur (Grade 1/6 systolic murmur) heard  Pulmonary/Chest: Effort normal    Fine left base crackles  Abdominal: Soft  Abdomen is pendulous and obese  Musculoskeletal: She exhibits no edema or deformity  Ambulates with antalgic gait and a cane   Skin: Skin is warm and dry  She is not diaphoretic  No erythema  No pallor  Psychiatric: She has a normal mood and affect  Her behavior is normal  Judgment and thought content normal          Patient Instructions     Discussed all with patient  You will need to schedule your mammo and your DEXA scan at [facility]  Read over the information on advanced directives  Pneumovax shot was given today  I am also calling in an antibiotic as your ear looks like it may have an active infection now and I want you cleared for surgery so take the antibiotic twice daily as directed  Proceed with the surgery we will try to get a copy of the CT scan that shows the glomus  Obesity   AMBULATORY CARE:   Obesity  is when your body mass index (BMI) is greater than 30  Your healthcare provider will use your height and weight to measure your BMI  The risks of obesity include  many health problems, such as injuries or physical disability  You may need tests to check for the following:  · Diabetes     · High blood pressure or high cholesterol     · Heart disease     · Gallbladder or liver disease     · Cancer of the colon, breast, prostate, liver, or kidney     · Sleep apnea     · Arthritis or gout  Seek care immediately if:   · You have a severe headache, confusion, or difficulty speaking  · You have weakness on one side of your body  · You have chest pain, sweating, or shortness of breath    Contact your healthcare provider if:   · You have symptoms of gallbladder or liver disease, such as pain in your upper abdomen  · You have knee or hip pain and discomfort while walking  · You have symptoms of diabetes, such as intense hunger and thirst, and frequent urination  · You have symptoms of sleep apnea, such as snoring or daytime sleepiness  · You have questions or concerns about your condition or care  Treatment for obesity  focuses on helping you lose weight to improve your health  Even a small decrease in BMI can reduce the risk for many health problems  Your healthcare provider will help you set a weight-loss goal   · Lifestyle changes  are the first step in treating obesity  These include making healthy food choices and getting regular physical activity  Your healthcare provider may suggest a weight-loss program that involves coaching, education, and therapy  · Medicine  may help you lose weight when it is used with a healthy diet and physical activity  · Surgery  can help you lose weight if you are very obese and have other health problems  There are several types of weight-loss surgery  Ask your healthcare provider for more information  Be successful losing weight:   · Set small, realistic goals  An example of a small goal is to walk for 20 minutes 5 days a week  Haim goal is to lose 5% of your body weight  · Tell friends, family members, and coworkers about your goals  and ask for their support  Ask a friend to lose weight with you, or join a weight-loss support group  · Identify foods or triggers that may cause you to overeat , and find ways to avoid them  Remove tempting high-calorie foods from your home and workplace  Place a bowl of fresh fruit on your kitchen counter  If stress causes you to eat, then find other ways to cope with stress  · Keep a diary to track what you eat and drink  Also write down how many minutes of physical activity you do each day   Weigh yourself once a week and record it in your diary  Eating changes: You will need to eat 500 to 1,000 fewer calories each day than you currently eat to lose 1 to 2 pounds a week  The following changes will help you cut calories:  · Eat smaller portions  Use small plates, no larger than 9 inches in diameter  Fill your plate half full of fruits and vegetables  Measure your food using measuring cups until you know what a serving size looks like  · Eat 3 meals and 1 or 2 snacks each day  Plan your meals in advance  Dajuan Wooten and eat at home most of the time  Eat slowly  · Eat fruits and vegetables at every meal   They are low in calories and high in fiber, which makes you feel full  Do not add butter, margarine, or cream sauce to vegetables  Use herbs to season steamed vegetables  · Eat less fat and fewer fried foods  Eat more baked or grilled chicken and fish  These protein sources are lower in calories and fat than red meat  Limit fast food  Dress your salads with olive oil and vinegar instead of bottled dressing  · Limit the amount of sugar you eat  Do not drink sugary beverages  Limit alcohol  Activity changes:  Physical activity is good for your body in many ways  It helps you burn calories and build strong muscles  It decreases stress and depression, and improves your mood  It can also help you sleep better  Talk to your healthcare provider before you begin an exercise program   · Exercise for at least 30 minutes 5 days a week  Start slowly  Set aside time each day for physical activity that you enjoy and that is convenient for you  It is best to do both weight training and an activity that increases your heart rate, such as walking, bicycling, or swimming  · Find ways to be more active  Do yard work and housecleaning  Walk up the stairs instead of using elevators  Spend your leisure time going to events that require walking, such as outdoor festivals or fairs   This extra physical activity can help you lose weight and keep it off  Follow up with your healthcare provider as directed: You may need to meet with a dietitian  Write down your questions so you remember to ask them during your visits  © 2017 [employer]  Information is for End User's use only and may not be sold, redistributed or otherwise used for commercial purposes  All illustrations and images included in CareNotes® are the copyrighted property of A D A M , Inc  or Mitchel Anthony  The above information is an  only  It is not intended as medical advice for individual conditions or treatments  Talk to your doctor, nurse or pharmacist before following any medical regimen to see if it is safe and effective for you  Urinary Incontinence   WHAT YOU NEED TO KNOW:   What is urinary incontinence? Urinary incontinence (UI) is when you lose control of your bladder  What causes UI? UI occurs because your bladder cannot store or empty urine properly  The following are the most common types of UI:  · Stress incontinence  is when you leak urine due to increased bladder pressure  This may happen when you cough, sneeze, or exercise  · Urge incontinence  is when you feel the need to urinate right away and leak urine accidentally  · Mixed incontinence  is when you have both stress and urge UI  What are the signs and symptoms of UI?   · You feel like your bladder does not empty completely when you urinate  · You urinate often and need to urinate immediately  · You leak urine when you sleep, or you wake up with the urge to urinate  · You leak urine when you cough, sneeze, exercise, or laugh  How is UI diagnosed? Your healthcare provider will ask how often you leak urine and whether you have stress or urge symptoms  Tell him which medicines you take, how often you urinate, and how much liquid you drink each day   You may need any of the following tests:  · Urine tests  may show infection or kidney function  · A pelvic exam  may be done to check for blockages  A pelvic exam will also show if your bladder, uterus, or other organs have moved out of place  · An x-ray, ultrasound, or CT  may show problems with parts of your urinary system  You may be given contrast liquid to help your organs show up better in the pictures  Tell the healthcare provider if you have ever had an allergic reaction to contrast liquid  Do not enter the MRI room with anything metal  Metal can cause serious injury  Tell the healthcare provider if you have any metal in or on your body  · A bladder scan  will show how much urine is left in your bladder after you urinate  You will be asked to urinate and then healthcare providers will use a small ultrasound machine to check the urine left in your bladder  · Cystometry  is used to check the function of your urinary system  Your healthcare provider checks the pressure in your bladder while filling it with fluid  Your bladder pressure may also be tested when your bladder is full and while you urinate  How is UI treated? · Medicines  can help strengthen your bladder control  · Electrical stimulation  is used to send a small amount of electrical energy to your pelvic floor muscles  This helps control your bladder function  Electrodes may be placed outside your body or in your rectum  For women, the electrodes may be placed in the vagina  · A bulking agent  may be injected into the wall of your urethra to make it thicker  This helps keep your urethra closed and decreases urine leakage  · Devices  such as a clamp, pessary, or tampon may help stop urine leaks  Ask your healthcare provider for more information about these and other devices  · Surgery  may be needed if other treatments do not work  Several types of surgery can help improve your bladder control  Ask your healthcare provider for more information about the surgery you may need    How can I manage my symptoms? · Do pelvic muscle exercises often  Your pelvic muscles help you stop urinating  Squeeze these muscles tight for 5 seconds, then relax for 5 seconds  Gradually work up to squeezing for 10 seconds  Do 3 sets of 15 repetitions a day, or as directed  This will help strengthen your pelvic muscles and improve bladder control  · A catheter  may be used to help empty your bladder  A catheter is a tiny, plastic tube that is put into your bladder to drain your urine  Your healthcare provider may tell you to use a catheter to prevent your bladder from getting too full and leaking urine  · Keep a UI record  Write down how often you leak urine and how much you leak  Make a note of what you were doing when you leaked urine  · Train your bladder  Go to the bathroom at set times, such as every 2 hours, even if you do not feel the urge to go  You can also try to hold your urine when you feel the urge to go  For example, hold your urine for 5 minutes when you feel the urge to go  As that becomes easier, hold your urine for 10 minutes  · Drink liquids as directed  Ask your healthcare provider how much liquid to drink each day and which liquids are best for you  You may need to limit the amount of liquid you drink to help control your urine leakage  Limit or do not have drinks that contain caffeine or alcohol  Do not drink any liquid right before you go to bed  · Prevent constipation  Eat a variety of high-fiber foods  Good examples are high-fiber cereals, beans, vegetables, and whole-grain breads  Prune juice may help make your bowel movement softer  Walking is the best way to trigger your intestines to have a bowel movement  · Exercise regularly and maintain a healthy weight  Ask your healthcare provider how much you should weigh and about the best exercise plan for you  Weight loss and exercise will decrease pressure on your bladder and help you control your leakage   Ask him to help you create a weight loss plan if you are overweight  When should I seek immediate care? · You have severe pain  · You are confused or cannot think clearly  When should I contact my healthcare provider? · You have a fever  · You see blood in your urine  · You have pain when you urinate  · You have new or worse pain, even after treatment  · Your mouth feels dry or you have vision changes  · Your urine is cloudy or smells bad  · You have questions or concerns about your condition or care  CARE AGREEMENT:   You have the right to help plan your care  Learn about your health condition and how it may be treated  Discuss treatment options with your caregivers to decide what care you want to receive  You always have the right to refuse treatment  The above information is an  only  It is not intended as medical advice for individual conditions or treatments  Talk to your doctor, nurse or pharmacist before following any medical regimen to see if it is safe and effective for you  © 2017 [employer]  Information is for End User's use only and may not be sold, redistributed or otherwise used for commercial purposes  All illustrations and images included in CareNotes® are the copyrighted property of A [employer] M , Inc  or Mitchel Raj  Cigarette Smoking and Your Health   AMBULATORY CARE:   Risks to your health if you smoke:  Nicotine and other chemicals found in tobacco damage every cell in your body  Even if you are a light smoker, you have an increased risk for cancer, heart disease, and lung disease  If you are pregnant or have diabetes, smoking increases your risk for complications  Benefits to your health if you stop smoking:   · You decrease respiratory symptoms such as coughing, wheezing, and shortness of breath  · You reduce your risk for cancers of the lung, mouth, throat, kidney, bladder, pancreas, stomach, and cervix   If you already have cancer, you increase the benefits of chemotherapy  You also reduce your risk for cancer returning or a second cancer from developing  · You reduce your risk for heart disease, blood clots, heart attack, and stroke  · You reduce your risk for lung infections, and diseases such as pneumonia, asthma, chronic bronchitis, and emphysema  · Your circulation improves  More oxygen can be delivered to your body  If you have diabetes, you lower your risk for complications, such as kidney, artery, and eye diseases  You also lower your risk for nerve damage  Nerve damage can lead to amputations, poor vision, and blindness  · You improve your body's ability to heal and to fight infections  Benefits to the health of others if you stop smoking:  Tobacco is harmful to nonsmokers who breathe in your secondhand smoke  The following are ways the health of others around you may improve when you stop smoking:  · You lower the risks for lung cancer and heart disease in nonsmoking adults  · If you are pregnant, you lower the risk for miscarriage, early delivery, low birth weight, and stillbirth  You also lower your baby's risk for SIDS, obesity, developmental delay, and neurobehavioral problems, such as ADHD  · If you have children, you lower their risk for ear infections, colds, pneumonia, bronchitis, and asthma  For more information and support to stop smoking:   · Smoke[employer]  gov  Phone: 7- 825 - 768-6661  Web Address: www [employer]  gov  Follow up with your healthcare provider as directed:  Write down your questions so you remember to ask them during your visits  © 2017 [address] Information is for End User's use only and may not be sold, redistributed or otherwise used for commercial purposes  All illustrations and images included in CareNotes® are the copyrighted property of [employer]  or Mitchel Anthony  The above information is an  only   It is not intended as medical advice for individual conditions or treatments  Talk to your doctor, nurse or pharmacist before following any medical regimen to see if it is safe and effective for you  Fall Prevention   AMBULATORY CARE:   Fall prevention  includes ways to make your home and other areas safer  It also includes ways you can move more carefully to prevent a fall  Health conditions that cause changes in your blood pressure, vision, or muscle strength and coordination may increase your risk for falls  Medicines may also increase your risk for falls if they make you dizzy, weak, or sleepy  Call 911 or have someone else call if:   · You have fallen and are unconscious  · You have fallen and cannot move part of your body  Contact your healthcare provider if:   · You have fallen and have pain or a headache  · You have questions or concerns about your condition or care  Fall prevention tips:   · Stand or sit up slowly  This may help you keep your balance and prevent falls  · Use assistive devices as directed  Your healthcare provider may suggest that you use a cane or walker to help you keep your balance  You may need to have grab bars put in your bathroom near the toilet or in the shower  · Wear shoes that fit well and have soles that   Wear shoes both inside and outside  Use slippers with good   Do not wear shoes with high heels  · Wear a personal alarm  This is a device that allows you to call 911 if you fall and need help  Ask your healthcare provider for more information  · Stay active  Exercise can help strengthen your muscles and improve your balance  Your healthcare provider may recommend water aerobics or walking  He or she may also recommend physical therapy to improve your coordination  Never start an exercise program without talking to your healthcare provider first      · Manage your medical conditions  Keep all appointments with your healthcare providers  Visit your eye doctor as directed         Home safety tips:   · Add items to prevent falls in the bathroom  Put nonslip strips on your bath or shower floor to prevent you from slipping  Use a bath mat if you do not have carpet in the bathroom  This will prevent you from falling when you step out of the bath or shower  Use a shower seat so you do not need to stand while you shower  Sit on the toilet or a chair in your bathroom to dry yourself and put on clothing  This will prevent you from losing your balance from drying or dressing yourself while you are standing  · Keep paths clear  Remove books, shoes, and other objects from walkways and stairs  Place cords for telephones and lamps out of the way so that you do not need to walk over them  Tape them down if you cannot move them  Remove small rugs  If you cannot remove a rug, secure it with double-sided tape  This will prevent you from tripping  · Install bright lights in your home  Use night lights to help light paths to the bathroom or kitchen  Always turn on the light before you start walking  · Keep items you use often on shelves within reach  Do not use a step stool to help you reach an item  · Paint or place reflective tape on the edges of your stairs  This will help you see the stairs better  Follow up with your healthcare provider as directed:  Write down your questions so you remember to ask them during your visits  © 2017 [address] Information is for End User's use only and may not be sold, redistributed or otherwise used for commercial purposes  All illustrations and images included in CareNotes® are the copyrighted property of A D A M , Inc  or Mitchel Anthony  The above information is an  only  It is not intended as medical advice for individual conditions or treatments  Talk to your doctor, nurse or pharmacist before following any medical regimen to see if it is safe and effective for you    Advance Directives   WHAT YOU NEED TO KNOW: What are advance directives? Advance directives are legal documents that state your wishes and plans for medical care  These plans are made ahead of time in case you lose your ability to make decisions for yourself  Advance directives can apply to any medical decision, such as the treatments you want, and if you want to donate organs  What are the types of advance directives? There are many types of advance directives, and each state has rules about how to use them  You may choose a combination of any of the following:  · Living will: This is a written record of the treatment you want  You can also choose which treatments you do not want, which to limit, and which to stop at a certain time  This includes surgery, medicine, IV fluid, and tube feedings  · Durable power of  for healthcare [facility]): This is a written record that states who you want to make healthcare choices for you when you are unable to make them for yourself  This person, called a proxy, is usually a family member or a friend  You may choose more than 1 proxy  · Do not resuscitate (DNR) order:  A DNR order is used in case your heart stops beating or you stop breathing  It is a request not to have certain forms of treatment, such as CPR  A DNR order may be included in other types of advance directives  · Medical directive: This covers the care that you want if you are in a coma, near death, or unable to make decisions for yourself  You can list the treatments you want for each condition  Treatment may include pain medicine, surgery, blood transfusions, dialysis, IV or tube feedings, and a ventilator (breathing machine)  · Values history: This document has questions about your views, beliefs, and how you feel and think about life  This information can help others choose the care that you would choose  Why are advance directives important? An advance directive helps you control your care   Although spoken wishes may be used, it is better to have your wishes written down  Spoken wishes can be misunderstood, or not followed  Treatments may be given even if you do not want them  An advance directive may make it easier for your family to make difficult choices about your care  How do I decide what to put in my advance directives? · Make informed decisions:  Make sure you fully understand treatments or care you may receive  Think about the benefits and problems your decisions could cause for you or your family  Talk to healthcare providers if you have concerns or questions before you write down your wishes  You may also want to talk with your [employer] or , or a   Check your state laws to make sure that what you put in your advance directive is legal      · Sign all forms:  Sign and date your advance directive when you have finished  You may also need 2 witnesses to sign the forms  Witnesses cannot be your doctor or his staff, your spouse, heirs or beneficiaries, people you owe money to, or your chosen proxy  Talk to your family, proxy, and healthcare providers about your advance directive  Give each person a copy, and keep one for yourself in a place you can get to easily  Do not keep it hidden or locked away  · Review and revise your plans: You can revise your advance directive at any time, as long as you are able to make decisions  Review your plan every year, and when there are changes in your life, or your health  When you make changes, let your family, proxy, and healthcare providers know  Give each a new copy  Where can I find more information? · [employer]  [address] , [address]  Phone: 2- 213 - [phone]  Phone: 3- [phone] - [phone]  Web Address: http://www  aafp org  · [address] [facility])  [address], [address]  Phone: 5- 109 - [phone]  Phone: 7- 394 - [phone]  Web Address: [employer]   CARE AGREEMENT:   You have the right to help plan your care  To help with this plan, you must learn about your health condition and treatment options  You must also learn about advance directives and how they are used  Work with your healthcare providers to decide what care will be used to treat you  You always have the right to refuse treatment  The above information is an  only  It is not intended as medical advice for individual conditions or treatments  Talk to your doctor, nurse or pharmacist before following any medical regimen to see if it is safe and effective for you  © 2017 [address]  Information is for End User's use only and may not be sold, redistributed or otherwise used for commercial purposes  All illustrations and images included in CareNotes® are the copyrighted property of A D A [employer]  or Mitchel Anthony  Obesity   AMBULATORY CARE:   Obesity  is when your body mass index (BMI) is greater than 30  Your healthcare provider will use your height and weight to measure your BMI  The risks of obesity include  many health problems, such as injuries or physical disability  You may need tests to check for the following:  · Diabetes     · High blood pressure or high cholesterol     · Heart disease     · Gallbladder or liver disease     · Cancer of the colon, breast, prostate, liver, or kidney     · Sleep apnea     · Arthritis or gout  Seek care immediately if:   · You have a severe headache, confusion, or difficulty speaking  · You have weakness on one side of your body  · You have chest pain, sweating, or shortness of breath  Contact your healthcare provider if:   · You have symptoms of gallbladder or liver disease, such as pain in your upper abdomen  · You have knee or hip pain and discomfort while walking  · You have symptoms of diabetes, such as intense hunger and thirst, and frequent urination       · You have symptoms of sleep apnea, such as snoring or daytime sleepiness  · You have questions or concerns about your condition or care  Treatment for obesity  focuses on helping you lose weight to improve your health  Even a small decrease in BMI can reduce the risk for many health problems  Your healthcare provider will help you set a weight-loss goal   · Lifestyle changes  are the first step in treating obesity  These include making healthy food choices and getting regular physical activity  Your healthcare provider may suggest a weight-loss program that involves coaching, education, and therapy  · Medicine  may help you lose weight when it is used with a healthy diet and physical activity  · Surgery  can help you lose weight if you are very obese and have other health problems  There are several types of weight-loss surgery  Ask your healthcare provider for more information  Be successful losing weight:   · Set small, realistic goals  An example of a small goal is to walk for 20 minutes 5 days a week  Anther goal is to lose 5% of your body weight  · Tell friends, family members, and coworkers about your goals  and ask for their support  Ask a friend to lose weight with you, or join a weight-loss support group  · Identify foods or triggers that may cause you to overeat , and find ways to avoid them  Remove tempting high-calorie foods from your home and workplace  Place a bowl of fresh fruit on your kitchen counter  If stress causes you to eat, then find other ways to cope with stress  · Keep a diary to track what you eat and drink  Also write down how many minutes of physical activity you do each day  Weigh yourself once a week and record it in your diary  Eating changes: You will need to eat 500 to 1,000 fewer calories each day than you currently eat to lose 1 to 2 pounds a week  The following changes will help you cut calories:  · Eat smaller portions    Use small plates, no larger than 9 inches in diameter  Fill your plate half full of fruits and vegetables  Measure your food using measuring cups until you know what a serving size looks like  · Eat 3 meals and 1 or 2 snacks each day  Plan your meals in advance  Sylvia Fried and eat at home most of the time  Eat slowly  · Eat fruits and vegetables at every meal   They are low in calories and high in fiber, which makes you feel full  Do not add butter, margarine, or cream sauce to vegetables  Use herbs to season steamed vegetables  · Eat less fat and fewer fried foods  Eat more baked or grilled chicken and fish  These protein sources are lower in calories and fat than red meat  Limit fast food  Dress your salads with olive oil and vinegar instead of bottled dressing  · Limit the amount of sugar you eat  Do not drink sugary beverages  Limit alcohol  Activity changes:  Physical activity is good for your body in many ways  It helps you burn calories and build strong muscles  It decreases stress and depression, and improves your mood  It can also help you sleep better  Talk to your healthcare provider before you begin an exercise program   · Exercise for at least 30 minutes 5 days a week  Start slowly  Set aside time each day for physical activity that you enjoy and that is convenient for you  It is best to do both weight training and an activity that increases your heart rate, such as walking, bicycling, or swimming  · Find ways to be more active  Do yard work and housecleaning  Walk up the stairs instead of using elevators  Spend your leisure time going to events that require walking, such as outdoor festivals or fairs  This extra physical activity can help you lose weight and keep it off  Follow up with your healthcare provider as directed: You may need to meet with a dietitian  Write down your questions so you remember to ask them during your visits     © 2017 [address] Information is for End User's use only and may not be sold, redistributed or otherwise used for commercial purposes  All illustrations and images included in CareNotes® are the copyrighted property of A [employer]  or Mitchel Anthony  The above information is an  only  It is not intended as medical advice for individual conditions or treatments  Talk to your doctor, nurse or pharmacist before following any medical regimen to see if it is safe and effective for you  Urinary Incontinence   WHAT YOU NEED TO KNOW:   What is urinary incontinence? Urinary incontinence (UI) is when you lose control of your bladder  What causes UI? UI occurs because your bladder cannot store or empty urine properly  The following are the most common types of UI:  · Stress incontinence  is when you leak urine due to increased bladder pressure  This may happen when you cough, sneeze, or exercise  · Urge incontinence  is when you feel the need to urinate right away and leak urine accidentally  · Mixed incontinence  is when you have both stress and urge UI  What are the signs and symptoms of UI?   · You feel like your bladder does not empty completely when you urinate  · You urinate often and need to urinate immediately  · You leak urine when you sleep, or you wake up with the urge to urinate  · You leak urine when you cough, sneeze, exercise, or laugh  How is UI diagnosed? Your healthcare provider will ask how often you leak urine and whether you have stress or urge symptoms  Tell him which medicines you take, how often you urinate, and how much liquid you drink each day  You may need any of the following tests:  · Urine tests  may show infection or kidney function  · A pelvic exam  may be done to check for blockages  A pelvic exam will also show if your bladder, uterus, or other organs have moved out of place  · An x-ray, ultrasound, or CT  may show problems with parts of your urinary system   You may be given contrast liquid to help your organs show up better in the pictures  Tell the healthcare provider if you have ever had an allergic reaction to contrast liquid  Do not enter the MRI room with anything metal  Metal can cause serious injury  Tell the healthcare provider if you have any metal in or on your body  · A bladder scan  will show how much urine is left in your bladder after you urinate  You will be asked to urinate and then healthcare providers will use a small ultrasound machine to check the urine left in your bladder  · Cystometry  is used to check the function of your urinary system  Your healthcare provider checks the pressure in your bladder while filling it with fluid  Your bladder pressure may also be tested when your bladder is full and while you urinate  How is UI treated? · Medicines  can help strengthen your bladder control  · Electrical stimulation  is used to send a small amount of electrical energy to your pelvic floor muscles  This helps control your bladder function  Electrodes may be placed outside your body or in your rectum  For women, the electrodes may be placed in the vagina  · A bulking agent  may be injected into the wall of your urethra to make it thicker  This helps keep your urethra closed and decreases urine leakage  · Devices  such as a clamp, pessary, or tampon may help stop urine leaks  Ask your healthcare provider for more information about these and other devices  · Surgery  may be needed if other treatments do not work  Several types of surgery can help improve your bladder control  Ask your healthcare provider for more information about the surgery you may need  How can I manage my symptoms? · Do pelvic muscle exercises often  Your pelvic muscles help you stop urinating  Squeeze these muscles tight for 5 seconds, then relax for 5 seconds  Gradually work up to squeezing for 10 seconds  Do 3 sets of 15 repetitions a day, or as directed   This will help strengthen your pelvic muscles and improve bladder control  · A catheter  may be used to help empty your bladder  A catheter is a tiny, plastic tube that is put into your bladder to drain your urine  Your healthcare provider may tell you to use a catheter to prevent your bladder from getting too full and leaking urine  · Keep a UI record  Write down how often you leak urine and how much you leak  Make a note of what you were doing when you leaked urine  · Train your bladder  Go to the bathroom at set times, such as every 2 hours, even if you do not feel the urge to go  You can also try to hold your urine when you feel the urge to go  For example, hold your urine for 5 minutes when you feel the urge to go  As that becomes easier, hold your urine for 10 minutes  · Drink liquids as directed  Ask your healthcare provider how much liquid to drink each day and which liquids are best for you  You may need to limit the amount of liquid you drink to help control your urine leakage  Limit or do not have drinks that contain caffeine or alcohol  Do not drink any liquid right before you go to bed  · Prevent constipation  Eat a variety of high-fiber foods  Good examples are high-fiber cereals, beans, vegetables, and whole-grain breads  Prune juice may help make your bowel movement softer  Walking is the best way to trigger your intestines to have a bowel movement  · Exercise regularly and maintain a healthy weight  Ask your healthcare provider how much you should weigh and about the best exercise plan for you  Weight loss and exercise will decrease pressure on your bladder and help you control your leakage  Ask him to help you create a weight loss plan if you are overweight  When should I seek immediate care? · You have severe pain  · You are confused or cannot think clearly  When should I contact my healthcare provider? · You have a fever  · You see blood in your urine      · You have pain when you urinate  · You have new or worse pain, even after treatment  · Your mouth feels dry or you have vision changes  · Your urine is cloudy or smells bad  · You have questions or concerns about your condition or care  CARE AGREEMENT:   You have the right to help plan your care  Learn about your health condition and how it may be treated  Discuss treatment options with your caregivers to decide what care you want to receive  You always have the right to refuse treatment  The above information is an  only  It is not intended as medical advice for individual conditions or treatments  Talk to your doctor, nurse or pharmacist before following any medical regimen to see if it is safe and effective for you  © 2017 [address] Information is for End User's use only and may not be sold, redistributed or otherwise used for commercial purposes  All illustrations and images included in CareNotes® are the copyrighted property of A [employer]  or Mitchel Anthony  Cigarette Smoking and Your Health   AMBULATORY CARE:   Risks to your health if you smoke:  Nicotine and other chemicals found in tobacco damage every cell in your body  Even if you are a light smoker, you have an increased risk for cancer, heart disease, and lung disease  If you are pregnant or have diabetes, smoking increases your risk for complications  Benefits to your health if you stop smoking:   · You decrease respiratory symptoms such as coughing, wheezing, and shortness of breath  · You reduce your risk for cancers of the lung, mouth, throat, kidney, bladder, pancreas, stomach, and cervix  If you already have cancer, you increase the benefits of chemotherapy  You also reduce your risk for cancer returning or a second cancer from developing  · You reduce your risk for heart disease, blood clots, heart attack, and stroke       · You reduce your risk for lung infections, and diseases such as pneumonia, asthma, chronic bronchitis, and emphysema  · Your circulation improves  More oxygen can be delivered to your body  If you have diabetes, you lower your risk for complications, such as kidney, artery, and eye diseases  You also lower your risk for nerve damage  Nerve damage can lead to amputations, poor vision, and blindness  · You improve your body's ability to heal and to fight infections  Benefits to the health of others if you stop smoking:  Tobacco is harmful to nonsmokers who breathe in your secondhand smoke  The following are ways the health of others around you may improve when you stop smoking:  · You lower the risks for lung cancer and heart disease in nonsmoking adults  · If you are pregnant, you lower the risk for miscarriage, early delivery, low birth weight, and stillbirth  You also lower your baby's risk for SIDS, obesity, developmental delay, and neurobehavioral problems, such as ADHD  · If you have children, you lower their risk for ear infections, colds, pneumonia, bronchitis, and asthma  For more information and support to stop smoking:   · [employer]  Phone: 9- 646 - 929-5811  Web Address: www [employer]  Follow up with your healthcare provider as directed:  Write down your questions so you remember to ask them during your visits  © 2017 [address]  Information is for End User's use only and may not be sold, redistributed or otherwise used for commercial purposes  All illustrations and images included in CareNotes® are the copyrighted property of A D A M , Inc  or Mithcel Anthony  The above information is an  only  It is not intended as medical advice for individual conditions or treatments  Talk to your doctor, nurse or pharmacist before following any medical regimen to see if it is safe and effective for you  Fall Prevention   AMBULATORY CARE:   Fall prevention  includes ways to make your home and other areas safer   It also includes ways you can move more carefully to prevent a fall  Health conditions that cause changes in your blood pressure, vision, or muscle strength and coordination may increase your risk for falls  Medicines may also increase your risk for falls if they make you dizzy, weak, or sleepy  Call 911 or have someone else call if:   · You have fallen and are unconscious  · You have fallen and cannot move part of your body  Contact your healthcare provider if:   · You have fallen and have pain or a headache  · You have questions or concerns about your condition or care  Fall prevention tips:   · Stand or sit up slowly  This may help you keep your balance and prevent falls  · Use assistive devices as directed  Your healthcare provider may suggest that you use a cane or walker to help you keep your balance  You may need to have grab bars put in your bathroom near the toilet or in the shower  · Wear shoes that fit well and have soles that   Wear shoes both inside and outside  Use slippers with good   Do not wear shoes with high heels  · Wear a personal alarm  This is a device that allows you to call 911 if you fall and need help  Ask your healthcare provider for more information  · Stay active  Exercise can help strengthen your muscles and improve your balance  Your healthcare provider may recommend water aerobics or walking  He or she may also recommend physical therapy to improve your coordination  Never start an exercise program without talking to your healthcare provider first      · Manage your medical conditions  Keep all appointments with your healthcare providers  Visit your eye doctor as directed  Home safety tips:   · Add items to prevent falls in the bathroom  Put nonslip strips on your bath or shower floor to prevent you from slipping  Use a bath mat if you do not have carpet in the bathroom  This will prevent you from falling when you step out of the bath or shower   Use a shower seat so you do not need to stand while you shower  Sit on the toilet or a chair in your bathroom to dry yourself and put on clothing  This will prevent you from losing your balance from drying or dressing yourself while you are standing  · Keep paths clear  Remove books, shoes, and other objects from walkways and stairs  Place cords for telephones and lamps out of the way so that you do not need to walk over them  Tape them down if you cannot move them  Remove small rugs  If you cannot remove a rug, secure it with double-sided tape  This will prevent you from tripping  · Install bright lights in your home  Use night lights to help light paths to the bathroom or kitchen  Always turn on the light before you start walking  · Keep items you use often on shelves within reach  Do not use a step stool to help you reach an item  · Paint or place reflective tape on the edges of your stairs  This will help you see the stairs better  Follow up with your healthcare provider as directed:  Write down your questions so you remember to ask them during your visits  © 2017 [facility] Information is for End User's use only and may not be sold, redistributed or otherwise used for commercial purposes  All illustrations and images included in CareNotes® are the copyrighted property of A D A M , Inc  or Mitchel Anthony  The above information is an  only  It is not intended as medical advice for individual conditions or treatments  Talk to your doctor, nurse or pharmacist before following any medical regimen to see if it is safe and effective for you  Advance Directives   WHAT YOU NEED TO KNOW:   What are advance directives? Advance directives are legal documents that state your wishes and plans for medical care  These plans are made ahead of time in case you lose your ability to make decisions for yourself   Advance directives can apply to any medical decision, such as the treatments you want, and if you want to donate organs  What are the types of advance directives? There are many types of advance directives, and each state has rules about how to use them  You may choose a combination of any of the following:  · Living will: This is a written record of the treatment you want  You can also choose which treatments you do not want, which to limit, and which to stop at a certain time  This includes surgery, medicine, IV fluid, and tube feedings  · Durable power of  for healthcare [facility]): This is a written record that states who you want to make healthcare choices for you when you are unable to make them for yourself  This person, called a proxy, is usually a family member or a friend  You may choose more than 1 proxy  · Do not resuscitate (DNR) order:  A DNR order is used in case your heart stops beating or you stop breathing  It is a request not to have certain forms of treatment, such as CPR  A DNR order may be included in other types of advance directives  · Medical directive: This covers the care that you want if you are in a coma, near death, or unable to make decisions for yourself  You can list the treatments you want for each condition  Treatment may include pain medicine, surgery, blood transfusions, dialysis, IV or tube feedings, and a ventilator (breathing machine)  · Values history: This document has questions about your views, beliefs, and how you feel and think about life  This information can help others choose the care that you would choose  Why are advance directives important? An advance directive helps you control your care  Although spoken wishes may be used, it is better to have your wishes written down  Spoken wishes can be misunderstood, or not followed  Treatments may be given even if you do not want them  An advance directive may make it easier for your family to make difficult choices about your care     How do I decide what to put in my advance directives? · Make informed decisions:  Make sure you fully understand treatments or care you may receive  Think about the benefits and problems your decisions could cause for you or your family  Talk to healthcare providers if you have concerns or questions before you write down your wishes  You may also want to talk with your Adventist or , or a   Check your state laws to make sure that what you put in your advance directive is legal      · Sign all forms:  Sign and date your advance directive when you have finished  You may also need 2 witnesses to sign the forms  Witnesses cannot be your doctor or his staff, your spouse, heirs or beneficiaries, people you owe money to, or your chosen proxy  Talk to your family, proxy, and healthcare providers about your advance directive  Give each person a copy, and keep one for yourself in a place you can get to easily  Do not keep it hidden or locked away  · Review and revise your plans: You can revise your advance directive at any time, as long as you are able to make decisions  Review your plan every year, and when there are changes in your life, or your health  When you make changes, let your family, proxy, and healthcare providers know  Give each a new copy  Where can I find more information? · [employer]  [address] , [place]  Phone: [phone]  Phone: [phone]  Web Address: http://www  aafp org  · [address] [facility])  [address] [facility] [place], [address]  Phone: [phone]  Phone: [phone]  Web Address: Baudilio leal  CARE AGREEMENT:   You have the right to help plan your care  To help with this plan, you must learn about your health condition and treatment options  You must also learn about advance directives and how they are used   Work with your healthcare providers to decide what care will be used to treat you  You always have the right to refuse treatment  The above information is an  only  It is not intended as medical advice for individual conditions or treatments  Talk to your doctor, nurse or pharmacist before following any medical regimen to see if it is safe and effective for you  © 2017 [address] Information is for End User's use only and may not be sold, redistributed or otherwise used for commercial purposes  All illustrations and images included in CareNotes® are the copyrighted property of A D A M , Inc  or MABEL Haile    Portions of the record may have been created with voice recognition software   Occasional wrong word or "sound a like" substitutions may have occurred due to the inherent limitations of voice recognition software   Read the chart carefully and recognize, using context, where substitutions have occurred

## 2019-07-01 NOTE — PROGRESS NOTES
Assessment and Plan:     Problem List Items Addressed This Visit     None      Visit Diagnoses     Medicare annual wellness visit, subsequent    -  Primary    Encounter for screening mammogram for breast cancer        Relevant Orders    Mammo screening bilateral w 3d & cad    Asymptomatic postmenopausal state        Relevant Orders    DXA bone density spine hip and pelvis    Encounter for immunization        Relevant Orders    PNEUMOCOCCAL POLYSACCHARIDE VACCINE 23-VALENT =>1YO SQ IM (Pneumovax)         History of Present Illness:     Patient presents for Medicare Annual Wellness visit    Patient Care Team:  Elba Grey as PCP - General (Family Medicine)  Oscar Hogan MD as Endoscopist     Problem List:     Patient Active Problem List   Diagnosis    Hypothyroid    Hypertension    Hyperlipidemia    Reactive airway disease without complication    JANICE (obstructive sleep apnea)    LVH (left ventricular hypertrophy)    Mitral annular calcification    Mitral valve stenosis    Pulmonary hypertension ([address] )    Shortness of breath    Chronic diastolic (congestive) heart failure (HCC)    Anemia    Obesity, morbid ([address] )    Gastroesophageal reflux disease without esophagitis    Arthritis    AVB (atrioventricular block)    Chronic diastolic congestive heart failure (HCC)    COPD, mild (HCC)    Coronary artery disease    JANICE on CPAP    S/P TAVR (transcatheter aortic valve replacement)    Acute pulmonary insufficiency    Postoperative anemia due to acute blood loss    Encounter for postoperative care    Depression with anxiety    Chronic otitis media      Past Medical and Surgical History:     Past Medical History:   Diagnosis Date    Anemia 08/22/2018    Arthritis     AVB (atrioventricular block)     first degree    CHF (congestive heart failure) (HCC)     COPD, mild (HCC)     Coronary artery disease     Dislocation of right shoulder joint     Frequent UTI     GERD (gastroesophageal reflux disease)     H/O: pneumonia     Heme positive stool     History of uterine cancer     s/p CALLIE    Hyperlipidemia     Hypertension     Hypothyroidism     Obesity, morbid ([facility] [address] ) 08/22/2018    JANICE on CPAP     Pulmonary hypertension ([facility] [address] [facility] ) 08/22/2018    Severe aortic stenosis     Simple goiter     Skin cyst     within the armpits, right     Past Surgical History:   Procedure Laterality Date    BREAST BIOPSY      CARDIAC CATHETERIZATION      CARPAL TUNNEL RELEASE Bilateral     CHOLECYSTECTOMY      DILATION AND CURETTAGE OF UTERUS      HYSTEROSCOPY      WV COLONOSCOPY FLX DX W/COLLJ SPEC WHEN PFRMD N/A 9/6/2018    Procedure: COLONOSCOPY;  Surgeon: Eleanor Abad MD;  Location: [facility]; Service: Gastroenterology    WV ECHO TRANSESOPHAG R-T 2D W/PRB IMG ACQUISJ I&R N/A 10/9/2018    Procedure: INTRA-OP TRANSESOPHAGEAL ECHOCARDIOGRAM (GARRISON); Surgeon: Kandace Prader, DO;  [facility];  Service: Cardiac Surgery    WV ESOPHAGOGASTRODUODENOSCOPY TRANSORAL DIAGNOSTIC N/A 8/31/2018    Procedure: ESOPHAGOGASTRODUODENOSCOPY (EGD); Surgeon: Eleanor Abad MD;  [facility]; Service: Gastroenterology    WV REPLACE AORTIC VALVE OPENFEMORAL ARTERY APPROACH N/A 10/9/2018    Procedure: REPLACEMENT AORTIC VALVE TRANSCATHETER (TAVR) TRANSFEMORAL W/ 23 MM MENDOZA NOE S3 VALVE (ACCESS OF LEFT);   Surgeon: Kandace Prader, DO;  [facility];  Service: Cardiac Surgery    TOTAL ABDOMINAL HYSTERECTOMY      TOTAL HIP ARTHROPLASTY Left 2007    TOTAL KNEE ARTHROPLASTY Bilateral       Family History:     Family History   Problem Relation Age of Onset    Diabetes Mother     Stroke Mother     Cancer Father     Lung cancer Father     Diabetes Sister     Heart disease Sister     Coronary artery disease Family     Diabetes Family     Hypertension Family     Cancer Family     Stroke Family       Social History:     Social History     Tobacco Use Smoking Status Never Smoker   Smokeless Tobacco Never Used     Social History     Substance and Sexual Activity   Alcohol Use No     Social History     Substance and Sexual Activity   Drug Use No      Medications and Allergies:     Current Outpatient Medications   Medication Sig Dispense Refill    albuterol (2 5 mg/3 mL) 0 083 % nebulizer solution Take 1 vial (2 5 mg total) by nebulization every 6 (six) hours as needed for wheezing or shortness of breath 360 mL 1    albuterol (PROVENTIL HFA,VENTOLIN HFA) 90 mcg/act inhaler Inhale 2 puffs every 6 (six) hours as needed for wheezing 1 Inhaler 3    aspirin (ECOTRIN LOW STRENGTH) 81 mg EC tablet Take 1 tablet (81 mg total) by mouth daily 100 tablet 0    b complex vitamins capsule Take 1 capsule by mouth 2 (two) times a day        BREO ELLIPTA 100-25 MCG/INH inhaler INHALE 1 PUFF DAILY RINSE MOUTH AFTER USE   60 each 3    Calcium Carb-Cholecalciferol (CALCIUM 600 + D PO) Take 1 tablet by mouth 2 (two) times a day      Fesoterodine Fumarate ER (TOVIAZ) 8 MG TB24 Take 8 mg by mouth daily      fluticasone (FLONASE) 50 mcg/act nasal spray SPRAY 2 SPRAYS INTO EACH NOSTRIL EVERY DAY  0    IRON PO Take by mouth daily      levothyroxine 50 mcg tablet TAKE 1 TABLET (50 MCG TOTAL) BY MOUTH DAILY 30 tablet 5    loratadine (CLARITIN) 10 mg tablet Take 10 mg by mouth daily      metoprolol succinate (TOPROL-XL) 25 mg 24 hr tablet TAKE 1 TABLET BY MOUTH EVERY DAY 30 tablet 2    omeprazole (PriLOSEC) 40 MG capsule TAKE ONE CAPSULE BY MOUTH TWICE A DAY  1    sertraline (ZOLOFT) 50 mg tablet Take 1 5 tablets (75 mg total) by mouth daily 45 tablet 3    simvastatin (ZOCOR) 40 mg tablet Take 1 tablet (40 mg total) by mouth daily at bedtime 90 tablet 1    temazepam (RESTORIL) 7 5 mg capsule Take 1 capsule (7 5 mg total) by mouth daily at bedtime as needed for sleep 90 capsule 0    KLOR-CON 10 10 MEQ tablet TAKE 1 TABLET (10 MEQ TOTAL) BY MOUTH 2 (TWO) TIMES A DAY (Patient not taking: Reported on 6/11/2019) 90 tablet 1     No current facility-administered medications for this visit  Allergies   Allergen Reactions    Latex Rash    Neosporin [Neomycin-Bacitracin Zn-Polymyx] Rash and Other (See Comments)     hives per [facility] order      Immunizations:     Immunization History   Administered Date(s) Administered    Pneumococcal Conjugate 13-Valent 06/19/2018      Medicare Screening Tests and Risk Assessments:     Suzy Lincoln is here for her Subsequent Wellness visit  Health Risk Assessment:  Patient rates overall health as fair  Patient feels that their physical health rating is Slightly better  Eyesight was rated as Same  Hearing was rated as Same  Patient feels that their emotional and mental health rating is Same  Pain experienced by patient in the last 7 days has been Some  Patient's pain rating has been 5/10  Patient states that she has experienced no weight loss or gain in last 6 months  Emotional/Mental Health:  Patient has been feeling nervous/anxious  PHQ-9 Depression Screening:    Frequency of the following problems over the past two weeks:      1  Little interest or pleasure in doing things: 0 - not at all      2  Feeling down, depressed, or hopeless: 1 - several days  PHQ-2 Score: 1          Broken Bones/Falls: Fall Risk Assessment:    In the past year, patient has experienced: History of falling in past year    Number of falls: 1    Injured during fall: Yes     Patient feels unsteady when standing or walking     Patient is worried about falling     Bladder/Bowel:  Patient has not leaked urine accidently in the last six months  Patient reports no loss of bowel control  Immunizations:  Patient has not had a flu vaccination within the last year  Patient has received a pneumonia shot  Patient has not received a shingles shot  Patient has not received tetanus/diphtheria shot  Home Safety:  Patient has trouble with stairs inside or outside of their home  Patient currently reports that there are no safety hazards present in home, working smoke alarms, no working carbon monoxide detectors  Preventative Screenings:   No breast cancer screening performed, colon cancer screen completed, cholesterol screen completed, glaucoma eye exam completed,     Nutrition:  Current diet: Frequent junk food and Regular with servings of the following:    Medications:  Patient is currently taking over-the-counter supplements  List of OTC medications includes: Vitamins and pain relief meds (aleve)  Patient is able to manage medications  Lifestyle Choices:  Patient reports no tobacco use  Patient has not smoked or used tobacco in the past   Patient reports no alcohol use  Patient drives a vehicle  Patient wears seat belt  Current level of exercise of physical activity described by patient as: Not very active   Activities of Daily Living:  Can get out of bed by his or her self, able to dress self, able to make own meals, able to do own shopping, able to bathe self, can do own laundry/housekeeping, can manage own money, pay bills and track expenses    Previous Hospitalizations:  Hospitalization or ED visit in past 12 months  Number of hospitalizations within the last year: 1-2        Advanced Directives:  Patient has decided on a power of   Patient has spoken to designated power of   Patient has not completed advanced directive          Preventative Screening/Counseling:      Cardiovascular:      General: Risks and Benefits Discussed and Screening Current          Diabetes:      General: Risks and Benefits Discussed and Screening Current          Colorectal Cancer:      General: Risks and Benefits Discussed and Screening Current          Breast Cancer:      General: Risks and Benefits Discussed          Cervical Cancer:      General: Risks and Benefits Discussed and Screening Not Indicated          Osteoporosis:      General: Risks and Benefits Discussed      Due for studies: DXA Appendicular          AAA:      General: Risks and Benefits Discussed and Screening Not Indicated          Glaucoma:      General: Risks and Benefits Discussed and Screening Current      Comments: Follows with Dr Dax Vinson        HIV:      General: Risks and Benefits Discussed and Screening Not Indicated          Hepatitis C:      General: Risks and Benefits Discussed and Screening Not Indicated        Advanced Directives:   Patient has no living will for healthcare, has durable POA for healthcare, patient does not have an advanced directive  Information on ACP and/or AD provided  5 wishes given  End of life assessment reviewed with patient  Provider agrees with end of life decisions   Additional Comments: Pt would not want resuscitation if no hope for recovery  Immunizations:      Influenza: Risks & Benefits Discussed and Patient Declines      Pneumococcal: Risks & Benefits Discussed and Pneumococcal Due Today      Shingrix: Risks & Benefits Discussed and Patient Declines      Hepatitis B (Low risk patients): Prevention Counseling and Series Not Indicated      Zostavax: Risks & Benefits Discussed and Patient Declines      TDAP: Risks & Benefits Discussed and Tdap Vaccine Needed Today  Additional Comments: Would need tdap if cut or burned

## 2019-07-03 ENCOUNTER — TELEPHONE (OUTPATIENT)
Dept: FAMILY MEDICINE CLINIC | Facility: CLINIC | Age: 79
End: 2019-07-03

## 2019-07-03 NOTE — TELEPHONE ENCOUNTER
Patient called she has some concerns from the pneumonia shot she got  She said the area is pink and it is going across her arm  It is  Not itchy, swollen or hot to the area  I did tell patient it is a local reaction which is normal but I would like to take a loom at it since she is going to the bank later       Patient will stop by to check the area

## 2019-07-03 NOTE — TELEPHONE ENCOUNTER
Sugar saw her it wasn't a local reactions   She gave her some medicine and told her to use cold compress on area

## 2019-07-05 DIAGNOSIS — F41.8 DEPRESSION WITH ANXIETY: ICD-10-CM

## 2019-07-05 DIAGNOSIS — I50.32 CHRONIC DIASTOLIC (CONGESTIVE) HEART FAILURE (HCC): ICD-10-CM

## 2019-07-05 RX ORDER — METOPROLOL SUCCINATE 25 MG/1
TABLET, EXTENDED RELEASE ORAL
Qty: 30 TABLET | Refills: 2 | Status: SHIPPED | OUTPATIENT
Start: 2019-07-05 | End: 2019-10-02 | Stop reason: SDUPTHER

## 2019-07-20 DIAGNOSIS — I50.32 CHRONIC DIASTOLIC (CONGESTIVE) HEART FAILURE (HCC): ICD-10-CM

## 2019-07-20 DIAGNOSIS — R06.00 DYSPNEA, UNSPECIFIED TYPE: ICD-10-CM

## 2019-07-22 ENCOUNTER — TELEPHONE (OUTPATIENT)
Dept: CARDIOLOGY CLINIC | Facility: CLINIC | Age: 79
End: 2019-07-22

## 2019-07-22 RX ORDER — FUROSEMIDE 20 MG/1
TABLET ORAL
Qty: 90 TABLET | Refills: 1 | Status: SHIPPED | OUTPATIENT
Start: 2019-07-22 | End: 2019-10-03

## 2019-07-22 NOTE — TELEPHONE ENCOUNTER
PT CAME IN FOR A CARDIAC RISK ASSESSMENT ON 06/04/19  PLEASE FAX ASSESSMENT [phone]  PLEASE FAX EKG AS WELL   Katt Gonzalez

## 2019-07-24 DIAGNOSIS — I50.32 CHRONIC DIASTOLIC (CONGESTIVE) HEART FAILURE (HCC): ICD-10-CM

## 2019-07-24 DIAGNOSIS — R06.00 DYSPNEA, UNSPECIFIED TYPE: ICD-10-CM

## 2019-07-24 RX ORDER — POTASSIUM CHLORIDE 750 MG/1
TABLET, FILM COATED, EXTENDED RELEASE ORAL
Qty: 90 TABLET | Refills: 1 | Status: SHIPPED | OUTPATIENT
Start: 2019-07-24 | End: 2019-10-03

## 2019-07-26 ENCOUNTER — TELEPHONE (OUTPATIENT)
Dept: FAMILY MEDICINE CLINIC | Facility: CLINIC | Age: 79
End: 2019-07-26

## 2019-07-26 NOTE — TELEPHONE ENCOUNTER
Called patient to see how her sister was and make her TCM apt  When I asked her how she was she told me not so good  She told me she had her ear surgery on Tuesday and today she feels lightheaded  I told her to call the surgeon and let them know what is going on because this is important  She agreed agreed and is going to call       I did tell her if she still feels sick on Monday don't worry about June we can always reschedule if she is feeling ok

## 2019-08-08 DIAGNOSIS — I35.0 SEVERE AORTIC STENOSIS: Primary | ICD-10-CM

## 2019-08-16 ENCOUNTER — HOSPITAL ENCOUNTER (OUTPATIENT)
Dept: NON INVASIVE DIAGNOSTICS | Facility: CLINIC | Age: 79
Discharge: HOME | End: 2019-08-16
Payer: MEDICARE

## 2019-08-16 ENCOUNTER — APPOINTMENT (OUTPATIENT)
Dept: LAB | Facility: CLINIC | Age: 79
End: 2019-08-16
Payer: MEDICARE

## 2019-08-16 DIAGNOSIS — I35.0 SEVERE AORTIC STENOSIS: ICD-10-CM

## 2019-08-16 LAB
ANION GAP SERPL CALCULATED.3IONS-SCNC: 5 MMOL/L (ref 4–13)
ATRIAL RATE: 67 BPM
BUN SERPL-MCNC: 17 MG/DL (ref 5–25)
CALCIUM SERPL-MCNC: 9.9 MG/DL (ref 8.3–10.1)
CHLORIDE SERPL-SCNC: 107 MMOL/L (ref 100–108)
CO2 SERPL-SCNC: 29 MMOL/L (ref 21–32)
CREAT SERPL-MCNC: 0.74 MG/DL (ref 0.6–1.3)
ERYTHROCYTE [DISTWIDTH] IN BLOOD BY AUTOMATED COUNT: 14.8 % (ref 11.6–15.1)
GFR SERPL CREATININE-BSD FRML MDRD: 77 ML/MIN/1.73SQ M
GLUCOSE SERPL-MCNC: 78 MG/DL (ref 65–140)
HCT VFR BLD AUTO: 43.2 % (ref 34.8–46.1)
HGB BLD-MCNC: 13.1 G/DL (ref 11.5–15.4)
MCH RBC QN AUTO: 27.5 PG (ref 26.8–34.3)
MCHC RBC AUTO-ENTMCNC: 30.3 G/DL (ref 31.4–37.4)
MCV RBC AUTO: 91 FL (ref 82–98)
P AXIS: 42 DEGREES
PLATELET # BLD AUTO: 329 THOUSANDS/UL (ref 149–390)
PMV BLD AUTO: 10.3 FL (ref 8.9–12.7)
POTASSIUM SERPL-SCNC: 4.4 MMOL/L (ref 3.5–5.3)
PR INTERVAL: 192 MS
QRS AXIS: -23 DEGREES
QRSD INTERVAL: 80 MS
QT INTERVAL: 424 MS
QTC INTERVAL: 448 MS
RBC # BLD AUTO: 4.76 MILLION/UL (ref 3.81–5.12)
SODIUM SERPL-SCNC: 141 MMOL/L (ref 136–145)
T WAVE AXIS: 49 DEGREES
VENTRICULAR RATE: 67 BPM
WBC # BLD AUTO: 10.43 THOUSAND/UL (ref 4.31–10.16)

## 2019-08-16 PROCEDURE — 85027 COMPLETE CBC AUTOMATED: CPT

## 2019-08-16 PROCEDURE — 93306 TTE W/DOPPLER COMPLETE: CPT | Performed by: INTERNAL MEDICINE

## 2019-08-16 PROCEDURE — 93005 ELECTROCARDIOGRAM TRACING: CPT | Performed by: THORACIC SURGERY (CARDIOTHORACIC VASCULAR SURGERY)

## 2019-08-16 PROCEDURE — 36415 COLL VENOUS BLD VENIPUNCTURE: CPT

## 2019-08-16 PROCEDURE — 93010 ELECTROCARDIOGRAM REPORT: CPT | Performed by: INTERNAL MEDICINE

## 2019-08-16 PROCEDURE — 80048 BASIC METABOLIC PNL TOTAL CA: CPT

## 2019-08-16 PROCEDURE — C8929 TTE W OR WO FOL WCON,DOPPLER: HCPCS

## 2019-08-16 RX ADMIN — PERFLUTREN 0.8 ML/MIN: 6.52 INJECTION, SUSPENSION INTRAVENOUS at 13:52

## 2019-08-20 ENCOUNTER — OFFICE VISIT (OUTPATIENT)
Dept: FAMILY MEDICINE CLINIC | Facility: CLINIC | Age: 79
End: 2019-08-20
Payer: MEDICARE

## 2019-08-20 VITALS
BODY MASS INDEX: 50.91 KG/M2 | TEMPERATURE: 98.7 F | OXYGEN SATURATION: 92 % | HEIGHT: 55 IN | SYSTOLIC BLOOD PRESSURE: 146 MMHG | RESPIRATION RATE: 18 BRPM | DIASTOLIC BLOOD PRESSURE: 74 MMHG | HEART RATE: 65 BPM | WEIGHT: 220 LBS

## 2019-08-20 DIAGNOSIS — I27.20 PULMONARY HYPERTENSION (HCC): Chronic | ICD-10-CM

## 2019-08-20 DIAGNOSIS — F43.21 GRIEVING: Primary | ICD-10-CM

## 2019-08-20 DIAGNOSIS — H95.192 COMPLICATION FOLLOWING LEFT MASTOIDECTOMY: ICD-10-CM

## 2019-08-20 PROCEDURE — 99214 OFFICE O/P EST MOD 30 MIN: CPT | Performed by: FAMILY MEDICINE

## 2019-08-20 NOTE — PROGRESS NOTES
Assessment/Plan:     Chronic Problems:  Pulmonary hypertension (HCC)  Keep the f/u with Dr Jen Peraza  Visit Diagnosis:  Diagnoses and all orders for this visit:    Grieving  Comments:  Pt feels she is doing ok and would prefer not to have her meds adjusted  Complication following left mastoidectomy  Comments:  Unclear to me if this is normal s/p surgery  Will refer to Dr Terrence Velazquez locally for an opinion prior to seeing [place]  Orders:  -     Ambulatory Referral to Otolaryngology; Future    Pulmonary hypertension (HCC)          Subjective:    Patient ID: Yolie Mckenna is a 77 y o  female  Pt is here for routine f/u appt  Sister recently passed away while coming out of the cardiology office  They lived together for 78 years  Pt states she was always worried about what would happen to her sister if Queta passed away first  Feels the ear is better since the surgery but lately bothering her a little and not sure if this is r/t stress  Pt is s/p RADICAL MIDDLE EAR & MASTOID SURG  GRAFT EAR CARTILAGE TO NOSE/EAR  Left Total Mastoidectomy  and GRAFT EAR CARTILAGE TO EAR OR NOSE  by [facility] in [facility]          Pt has to go back out to [place] in September  Still with sob with exertion  Follows with Dr Jen Peraza  H/O pulmonary hypertension  Has no f/u appt  Can't get her weight down  Pt cannot afford weight management  Takes all other meds as directed  No side effects noted  The following portions of the patient's history were reviewed and updated as appropriate: allergies, current medications, past family history, past medical history, past social history, past surgical history and problem list     Review of Systems   Constitutional: Negative for chills, diaphoresis, fatigue and fever  HENT: Negative  Feels her ear discomfort was getting better, but still draining    Eyes: Negative  Respiratory: Positive for shortness of breath  Negative for cough and wheezing      Cardiovascular: Negative for chest pain, palpitations and leg swelling  Gastrointestinal: Negative  Genitourinary: Negative  Neurological: Negative for dizziness, light-headedness (seems to have resolved after her surgery  ) and headaches  Psychiatric/Behavioral: Negative for dysphoric mood and sleep disturbance  The patient is not nervous/anxious  Sad about losing her sister            /74   Pulse 65   Temp 98 7 °F (37 1 °C)   Resp 18   Ht 4' 7" (1 397 m)   Wt 99 8 kg (220 lb)   SpO2 92%   BMI 51 13 kg/m²   Social History     Socioeconomic History    Marital status: Single     Spouse name: Not on file    Number of children: Not on file    Years of education: Not on file    Highest education level: Not on file   Occupational History    Not on file   Social Needs    Financial resource strain: Not on file    Food insecurity:     Worry: Not on file     Inability: Not on file    Transportation needs:     Medical: Not on file     Non-medical: Not on file   Tobacco Use    Smoking status: Never Smoker    Smokeless tobacco: Never Used   Substance and Sexual Activity    Alcohol use: No    Drug use: No    Sexual activity: Never   Lifestyle    Physical activity:     Days per week: Not on file     Minutes per session: Not on file    Stress: Not on file   Relationships    Social connections:     Talks on phone: Not on file     Gets together: Not on file     Attends Restorationist service: Not on file     Active member of club or organization: Not on file     Attends meetings of clubs or organizations: Not on file     Relationship status: Not on file    Intimate partner violence:     Fear of current or ex partner: Not on file     Emotionally abused: Not on file     Physically abused: Not on file     Forced sexual activity: Not on file   Other Topics Concern    Not on file   Social History Narrative    Denied drinking coffee    Denied exercise habits     Past Medical History:   Diagnosis Date    Anemia 08/22/2018  Arthritis     AVB (atrioventricular block)     first degree    CHF (congestive heart failure) (HCC)     COPD, mild (HCC)     Coronary artery disease     Dislocation of right shoulder joint     Frequent UTI     GERD (gastroesophageal reflux disease)     H/O: pneumonia     Heme positive stool     History of uterine cancer     s/p CALLIE    Hyperlipidemia     Hypertension     Hypothyroidism     Obesity, morbid ([facility] [address] ) 08/22/2018    JANICE on CPAP     Pulmonary hypertension ([facility] ) 08/22/2018    Severe aortic stenosis     Simple goiter     Skin cyst     within the armpits, right     Family History   Problem Relation Age of Onset    Diabetes Mother     Stroke Mother     Cancer Father     Lung cancer Father     Diabetes Sister     Heart disease Sister     Coronary artery disease Family     Diabetes Family     Hypertension Family     Cancer Family     Stroke Family      Past Surgical History:   Procedure Laterality Date    BREAST BIOPSY      CARDIAC CATHETERIZATION      CARPAL TUNNEL RELEASE Bilateral     CHOLECYSTECTOMY      DILATION AND CURETTAGE OF UTERUS      HYSTEROSCOPY      MASTOID SURGERY      LA COLONOSCOPY FLX DX W/COLLJ SPEC WHEN PFRMD N/A 9/6/2018    Procedure: COLONOSCOPY;  Surgeon: Lakhwinder Mackay MD;  Location: [facility]; Service: Gastroenterology    LA ECHO TRANSESOPHAG R-T 2D W/PRB IMG ACQUISJ I&R N/A 10/9/2018    Procedure: INTRA-OP TRANSESOPHAGEAL ECHOCARDIOGRAM (GARRISON); Surgeon: Ja Wilkes DO;  Location:  MAIN OR;  Service: Cardiac Surgery    LA ESOPHAGOGASTRODUODENOSCOPY TRANSORAL DIAGNOSTIC N/A 8/31/2018    Procedure: ESOPHAGOGASTRODUODENOSCOPY (EGD); Surgeon: Lakhwinder Mackay MD;  [facility]: [facility]; Service: Gastroenterology    LA REPLACE AORTIC VALVE OPENFEMORAL ARTERY APPROACH N/A 10/9/2018    Procedure: REPLACEMENT AORTIC VALVE TRANSCATHETER (TAVR) TRANSFEMORAL W/ 23 MM MENDOZA NOE S3 VALVE (ACCESS OF LEFT);   Surgeon: Leonardo Dallas DO Migue;  [facility]: [facility];  Service: Cardiac Surgery    TOTAL ABDOMINAL HYSTERECTOMY      TOTAL HIP ARTHROPLASTY Left 2007    TOTAL KNEE ARTHROPLASTY Bilateral        Current Outpatient Medications:     albuterol (2 5 mg/3 mL) 0 083 % nebulizer solution, Take 1 vial (2 5 mg total) by nebulization every 6 (six) hours as needed for wheezing or shortness of breath, Disp: 360 mL, Rfl: 1    albuterol (PROVENTIL HFA,VENTOLIN HFA) 90 mcg/act inhaler, Inhale 2 puffs every 6 (six) hours as needed for wheezing, Disp: 1 Inhaler, Rfl: 3    aspirin (ECOTRIN LOW STRENGTH) 81 mg EC tablet, Take 1 tablet (81 mg total) by mouth daily, Disp: 100 tablet, Rfl: 0    b complex vitamins capsule, Take 1 capsule by mouth 2 (two) times a day  , Disp: , Rfl:     BREO ELLIPTA 100-25 MCG/INH inhaler, INHALE 1 PUFF DAILY RINSE MOUTH AFTER USE , Disp: 60 each, Rfl: 3    Calcium Carb-Cholecalciferol (CALCIUM 600 + D PO), Take 1 tablet by mouth 2 (two) times a day, Disp: , Rfl:     Fesoterodine Fumarate ER (TOVIAZ) 8 MG TB24, Take 8 mg by mouth daily, Disp: , Rfl:     fluticasone (FLONASE) 50 mcg/act nasal spray, SPRAY 2 SPRAYS INTO EACH NOSTRIL EVERY DAY, Disp: , Rfl: 0    furosemide (LASIX) 20 mg tablet, TAKE 1 TABLET BY MOUTH EVERY DAY, Disp: 90 tablet, Rfl: 1    IRON PO, Take by mouth daily, Disp: , Rfl:     levothyroxine 50 mcg tablet, TAKE 1 TABLET (50 MCG TOTAL) BY MOUTH DAILY, Disp: 30 tablet, Rfl: 5    loratadine (CLARITIN) 10 mg tablet, Take 10 mg by mouth daily, Disp: , Rfl:     metoprolol succinate (TOPROL-XL) 25 mg 24 hr tablet, TAKE 1 TABLET BY MOUTH EVERY DAY, Disp: 30 tablet, Rfl: 2    omeprazole (PriLOSEC) 40 MG capsule, Take 1 capsule (40 mg total) by mouth 2 (two) times a day, Disp: 180 capsule, Rfl: 1    potassium chloride (K-DUR) 10 mEq tablet, TAKE 1 TABLET BY MOUTH 2 TIMES A DAY, Disp: 90 tablet, Rfl: 1    sertraline (ZOLOFT) 50 mg tablet, TAKE 1 5 TABLETS BY MOUTH DAILY, Disp: 45 tablet, Rfl: 3    simvastatin (ZOCOR) 40 mg tablet, Take 1 tablet (40 mg total) by mouth daily at bedtime, Disp: 90 tablet, Rfl: 1    temazepam (RESTORIL) 7 5 mg capsule, Take 1 capsule (7 5 mg total) by mouth daily at bedtime as needed for sleep, Disp: 90 capsule, Rfl: 0    Allergies   Allergen Reactions    Latex Rash    Neosporin [Neomycin-Bacitracin Zn-Polymyx] Rash and Other (See Comments)     hives per [facility] order          Lab Review   Hospital Outpatient Visit on 08/16/2019   Component Date Value    Ventricular Rate 08/16/2019 67     Atrial Rate 08/16/2019 67     [place] Interval 08/16/2019 192     QRSD Interval 08/16/2019 80     QT Interval 08/16/2019 424     QTC Interval 08/16/2019 448     P Axis 08/16/2019 42     QRS [place] 08/16/2019 -23     T Wave [place] 08/16/2019 49    Appointment on 08/16/2019   Component Date Value    Sodium 08/16/2019 141     Potassium 08/16/2019 4 4     Chloride 08/16/2019 107     CO2 08/16/2019 29     ANION GAP 08/16/2019 5     BUN 08/16/2019 17     Creatinine 08/16/2019 0 74     Glucose 08/16/2019 78     Calcium 08/16/2019 9 9     eGFR 08/16/2019 77     WBC 08/16/2019 10 43*    RBC 08/16/2019 4 76     Hemoglobin 08/16/2019 13 1     Hematocrit 08/16/2019 43 2     MCV 08/16/2019 91     MCH 08/16/2019 27 5     MCHC 08/16/2019 30 3*    RDW 08/16/2019 14 8     Platelets 76/75/1027 329     MPV 08/16/2019 10 3    Appointment on 06/27/2019   Component Date Value    BUN 06/27/2019 20     Glucose, Fasting 06/27/2019 77     Creatinine 06/27/2019 0 93     eGFR 06/27/2019 59    Appointment on 06/27/2019   Component Date Value    Hemoglobin 06/27/2019 13 8     Hematocrit 06/27/2019 44 6         Imaging: No results found  Objective:     Physical Exam   Constitutional: She is oriented to person, place, and time  She appears well-developed and well-nourished  No distress  HENT:   Head: Normocephalic and atraumatic     Right Ear: External ear normal    Left ear canal appears to have white film over ? Tm  Pt is using a cotton ball in her left tm  Eyes: Pupils are equal, round, and reactive to light  EOM are normal  Right eye exhibits no discharge  Left eye exhibits no discharge  Neck: Normal range of motion  Neck supple  Cardiovascular: Normal rate, regular rhythm and normal heart sounds  Pulmonary/Chest: Effort normal    Slightly decreased breath sounds bilaterally  Abdominal:   Abdomen is pendulous  Musculoskeletal: She exhibits no edema or deformity  Ambulates slowly with a cane an antalgic gait  Neurological: She is alert and oriented to person, place, and time  Skin: Skin is warm and dry  She is not diaphoretic  No erythema  Psychiatric: She has a normal mood and affect  Her behavior is normal  Judgment and thought content normal          Patient Instructions   Discussed all with patient  Condolences given regarding the loss of June  She will definitely be missed  Please make your appointment with Dr Lisandro Vidales locally as I would like his opinion on your ear since you have had some complaints today  Make the appt with Dr Abundio George  Continue the current meds  MABEL Castro    Portions of the record may have been created with voice recognition software   Occasional wrong word or "sound a like" substitutions may have occurred due to the inherent limitations of voice recognition software   Read the chart carefully and recognize, using context, where substitutions have occurred

## 2019-08-20 NOTE — PATIENT INSTRUCTIONS
Discussed all with patient  Condolences given regarding the loss of June  She will definitely be missed  Please make your appointment with Dr Sirisha Briones locally as I would like his opinion on your ear since you have had some complaints today  Make the appt with Dr John Fields  Continue the current meds

## 2019-08-27 ENCOUNTER — TELEPHONE (OUTPATIENT)
Dept: CARDIOLOGY CLINIC | Facility: CLINIC | Age: 79
End: 2019-08-27

## 2019-08-29 ENCOUNTER — OFFICE VISIT (OUTPATIENT)
Dept: PULMONOLOGY | Facility: CLINIC | Age: 79
End: 2019-08-29
Payer: MEDICARE

## 2019-08-29 VITALS
HEART RATE: 84 BPM | SYSTOLIC BLOOD PRESSURE: 110 MMHG | HEIGHT: 55 IN | WEIGHT: 220 LBS | DIASTOLIC BLOOD PRESSURE: 70 MMHG | OXYGEN SATURATION: 95 % | BODY MASS INDEX: 50.91 KG/M2

## 2019-08-29 DIAGNOSIS — J96.11 CHRONIC HYPOXEMIC RESPIRATORY FAILURE (HCC): ICD-10-CM

## 2019-08-29 DIAGNOSIS — R06.00 DYSPNEA ON EXERTION: Primary | ICD-10-CM

## 2019-08-29 DIAGNOSIS — G47.33 OSA ON CPAP: Chronic | ICD-10-CM

## 2019-08-29 DIAGNOSIS — Z99.89 OSA ON CPAP: Chronic | ICD-10-CM

## 2019-08-29 DIAGNOSIS — J45.20 MILD INTERMITTENT REACTIVE AIRWAY DISEASE WITHOUT COMPLICATION: ICD-10-CM

## 2019-08-29 DIAGNOSIS — I27.20 PULMONARY HYPERTENSION (HCC): Chronic | ICD-10-CM

## 2019-08-29 PROCEDURE — 99214 OFFICE O/P EST MOD 30 MIN: CPT | Performed by: PHYSICIAN ASSISTANT

## 2019-08-29 NOTE — PROGRESS NOTES
Assessment/Plan:   Diagnoses and all orders for this visit:    Dyspnea on exertion    Mild intermittent reactive airway disease without complication    JANICE on CPAP    Pulmonary hypertension ([facility] [address] )    Chronic hypoxemic respiratory failure ([facility] [address] )     Patient is here today for follow-up  Overall stable with her breathing  Has chronic dyspnea on exertion likely multifactorial due to her underlying cardiac disease, asthma, pulmonary hypertension and deconditioning  Her pulmonary hypertension did improve after her aortic valve replacement  She has chronic hypoxemic respiratory failure on 2 L nasal cannula with exertion and at night  When she does use the oxygen she does note improvement in her dyspnea, discussed the importance of being compliant with the oxygen  She will continue with her Breo daily, albuterol 4 times per day as needed which she does not need to use on a regular basis  She will follow-up with us in 6 months or sooner if necessary  Return in about 6 months (around 2/29/2020)  All questions are answered to the patient's satisfaction and understanding  She verbalizes understanding  She is encouraged to call with any further questions or concerns  Portions of the record may have been created with voice recognition software  Occasional wrong word or "sound a like" substitutions may have occurred due to the inherent limitations of voice recognition software  Read the chart carefully and recognize, using context, where substitutions have occurred      Electronically Signed by Geovanna Bucio PA-C    ______________________________________________________________________    Chief Complaint:   Chief Complaint   Patient presents with    COPD     fup     Pulmonary Hypertension       Patient ID: Vidya Sue is a 77 y o  y o  female has a past medical history of Anemia (08/22/2018), Arthritis, AVB (atrioventricular block), CHF (congestive heart failure) ([facility] ), COPD, mild ([facility] ), Coronary artery disease, Dislocation of right shoulder joint, Frequent UTI, GERD (gastroesophageal reflux disease), H/O: pneumonia, Heme positive stool, History of uterine cancer, Hyperlipidemia, Hypertension, Hypothyroidism, Obesity, morbid ([facility] [address] ) (08/22/2018), JANICE on CPAP, Pulmonary hypertension ([facility] ) (08/22/2018), Severe aortic stenosis, Simple goiter, and Skin cyst     8/29/2019  Patient presents today for follow-up visit  Patient is a 59-year-old female nonsmoker with past medical history of asthma, aortic stenosis status post TAVR, hypertension, JANICE on CPAP, hyperlipidemia, obesity, pulmonary hypertension, chronic respiratory failure on 2 L O2 with exertion and at night  She is here today for follow-up  Since her last visit with us her sister has passed away so she has been feeling more depressed  Overall she is stable with her breathing  When she uses her oxygen with exertion she does note improvement in her breathing  She continues with Breo, does not use her albuterol on a daily basis  She continues with her CPAP at night  Review of Systems   Constitutional: Negative  HENT: Negative  Respiratory: Positive for shortness of breath  Cardiovascular: Negative  Gastrointestinal: Negative  Genitourinary: Negative  Musculoskeletal: Positive for arthralgias  Skin: Negative  Allergic/Immunologic: Negative  Neurological: Negative  Psychiatric/Behavioral: Negative  Smoking history: She reports that she has never smoked   She has never used smokeless tobacco     The following portions of the patient's history were reviewed and updated as appropriate: allergies, current medications, past family history, past medical history, past social history, past surgical history and problem list     Immunization History   Administered Date(s) Administered    Pneumococcal Conjugate 13-Valent 06/19/2018    Pneumococcal Polysaccharide PPV23 07/01/2019 Current Outpatient Medications   Medication Sig Dispense Refill    albuterol (2 5 mg/3 mL) 0 083 % nebulizer solution Take 1 vial (2 5 mg total) by nebulization every 6 (six) hours as needed for wheezing or shortness of breath 360 mL 1    albuterol (PROVENTIL HFA,VENTOLIN HFA) 90 mcg/act inhaler Inhale 2 puffs every 6 (six) hours as needed for wheezing 1 Inhaler 3    aspirin (ECOTRIN LOW STRENGTH) 81 mg EC tablet Take 1 tablet (81 mg total) by mouth daily 100 tablet 0    b complex vitamins capsule Take 1 capsule by mouth 2 (two) times a day        BREO ELLIPTA 100-25 MCG/INH inhaler INHALE 1 PUFF DAILY RINSE MOUTH AFTER USE   60 each 3    Calcium Carb-Cholecalciferol (CALCIUM 600 + D PO) Take 1 tablet by mouth 2 (two) times a day      Fesoterodine Fumarate ER (TOVIAZ) 8 MG TB24 Take 8 mg by mouth daily      levothyroxine 50 mcg tablet TAKE 1 TABLET (50 MCG TOTAL) BY MOUTH DAILY 30 tablet 5    loratadine (CLARITIN) 10 mg tablet Take 10 mg by mouth daily      metoprolol succinate (TOPROL-XL) 25 mg 24 hr tablet TAKE 1 TABLET BY MOUTH EVERY DAY 30 tablet 2    omeprazole (PriLOSEC) 40 MG capsule Take 1 capsule (40 mg total) by mouth 2 (two) times a day 180 capsule 1    sertraline (ZOLOFT) 50 mg tablet TAKE 1 5 TABLETS BY MOUTH DAILY 45 tablet 3    simvastatin (ZOCOR) 40 mg tablet Take 1 tablet (40 mg total) by mouth daily at bedtime 90 tablet 1    temazepam (RESTORIL) 7 5 mg capsule Take 1 capsule (7 5 mg total) by mouth daily at bedtime as needed for sleep 90 capsule 0    fluticasone (FLONASE) 50 mcg/act nasal spray SPRAY 2 SPRAYS INTO EACH NOSTRIL EVERY DAY  0    furosemide (LASIX) 20 mg tablet TAKE 1 TABLET BY MOUTH EVERY DAY (Patient not taking: Reported on 8/29/2019) 90 tablet 1    IRON PO Take by mouth daily      potassium chloride (K-DUR) 10 mEq tablet TAKE 1 TABLET BY MOUTH 2 TIMES A DAY (Patient not taking: Reported on 8/29/2019) 90 tablet 1     No current facility-administered medications for this visit  Allergies: Latex and Neosporin [neomycin-bacitracin zn-polymyx]    Objective:  Vitals:    08/29/19 1434   BP: 110/70   Pulse: 84   SpO2: 95%   Weight: 99 8 kg (220 lb)   Height: 4' 7" (1 397 m)   Oxygen Therapy  SpO2: 95 %    Wt Readings from Last 3 Encounters:   08/29/19 99 8 kg (220 lb)   08/20/19 99 8 kg (220 lb)   07/01/19 97 5 kg (215 lb)     Body mass index is 51 13 kg/m²  Physical Exam   Constitutional: She is oriented to person, place, and time  She appears well-developed and well-nourished  No distress  HENT:   Mouth/Throat: Oropharynx is clear and moist    Crowded oropharyngeal airway   Eyes: Pupils are equal, round, and reactive to light  Neck:   Short and wide neck   Cardiovascular: Normal rate, regular rhythm and normal heart sounds  Pulmonary/Chest: Effort normal and breath sounds normal  No accessory muscle usage  No respiratory distress  She has no decreased breath sounds  She has no wheezes  She has no rhonchi  She has no rales  Abdominal: Soft  There is no tenderness  Musculoskeletal: Normal range of motion  Neurological: She is alert and oriented to person, place, and time  Skin: Skin is warm and dry  No rash noted  Psychiatric: She has a normal mood and affect  Lab Review:   not applicable    Diagnostics:  No new testing    Office Spirometry Results:     ESS:    No results found

## 2019-09-12 ENCOUNTER — OFFICE VISIT (OUTPATIENT)
Dept: FAMILY MEDICINE CLINIC | Facility: CLINIC | Age: 79
End: 2019-09-12
Payer: MEDICARE

## 2019-09-12 VITALS
HEIGHT: 55 IN | RESPIRATION RATE: 18 BRPM | BODY MASS INDEX: 50.45 KG/M2 | WEIGHT: 218 LBS | OXYGEN SATURATION: 93 % | HEART RATE: 72 BPM | SYSTOLIC BLOOD PRESSURE: 140 MMHG | TEMPERATURE: 99.1 F | DIASTOLIC BLOOD PRESSURE: 60 MMHG

## 2019-09-12 DIAGNOSIS — J02.9 PHARYNGITIS, UNSPECIFIED ETIOLOGY: Primary | ICD-10-CM

## 2019-09-12 PROCEDURE — 1124F ACP DISCUSS-NO DSCNMKR DOCD: CPT | Performed by: FAMILY MEDICINE

## 2019-09-12 PROCEDURE — 99213 OFFICE O/P EST LOW 20 MIN: CPT | Performed by: FAMILY MEDICINE

## 2019-09-12 RX ORDER — AMOXICILLIN 500 MG/1
500 CAPSULE ORAL EVERY 8 HOURS SCHEDULED
Qty: 30 CAPSULE | Refills: 0 | Status: SHIPPED | OUTPATIENT
Start: 2019-09-12 | End: 2019-09-22

## 2019-09-12 NOTE — PROGRESS NOTES
Assessment/Plan:     Chronic Problems:  No problem-specific Assessment & Plan notes found for this encounter  Visit Diagnosis:  Diagnoses and all orders for this visit:    Pharyngitis, unspecified etiology  Comments: Will treat with abx  Salt water gargles  Mucinex for the cough twice daily and cold elvis lozenges for the sore throat  Orders:  -     amoxicillin (AMOXIL) 500 mg capsule; Take 1 capsule (500 mg total) by mouth every 8 (eight) hours for 10 days          Subjective:    Patient ID: Cat Posey is a 77 y o  female  Pt is here with c/o sore throat since Tuesday  Then developed cough, but cough is not productive  No chest pain with cough  Throat is still scratchy and sore  Feels her ears are coming along  Told her left ear is still healing  Seen by Dr Pro Gottlieb and given ear drops and has f/u with her ent surgeon on the 26th of this month  Pt was using chloraseptic spray with no relief  Just stopped taking her iron  Takes all other meds as directed  No side effects noted  The following portions of the patient's history were reviewed and updated as appropriate: allergies, current medications, past family history, past medical history, past social history, past surgical history and problem list     Review of Systems   Constitutional: Positive for diaphoresis, fatigue and fever  Negative for chills  HENT: Positive for congestion and sore throat  Negative for ear pain, sinus pressure and sneezing  Respiratory: Positive for cough, shortness of breath (wearing her oxygen) and wheezing  Cardiovascular: Negative for chest pain and palpitations  Gastrointestinal: Negative  Genitourinary: Negative  Neurological: Positive for headaches  Negative for dizziness and light-headedness  Psychiatric/Behavioral: Negative for dysphoric mood  The patient is not nervous/anxious            /60   Pulse 72   Temp 99 1 °F (37 3 °C)   Resp 18   Ht 4' 7" (1 397 m)   Wt 98 9 kg (218 lb) SpO2 93%   BMI 50 67 kg/m²   Social History     Socioeconomic History    Marital status: Single     Spouse name: Not on file    Number of children: Not on file    Years of education: Not on file    Highest education level: Not on file   Occupational History    Not on file   Social Needs    Financial resource strain: Not on file    Food insecurity:     Worry: Not on file     Inability: Not on file    Transportation needs:     Medical: Not on file     Non-medical: Not on file   Tobacco Use    Smoking status: Never Smoker    Smokeless tobacco: Never Used   Substance and Sexual Activity    Alcohol use: No    Drug use: No    Sexual activity: Never   Lifestyle    Physical activity:     Days per week: Not on file     Minutes per session: Not on file    Stress: Not on file   Relationships    Social connections:     Talks on phone: Not on file     Gets together: Not on file     Attends Scientologist service: Not on file     Active member of club or organization: Not on file     Attends meetings of clubs or organizations: Not on file     Relationship status: Not on file    Intimate partner violence:     Fear of current or ex partner: Not on file     Emotionally abused: Not on file     Physically abused: Not on file     Forced sexual activity: Not on file   Other Topics Concern    Not on file   Social History Narrative    Denied drinking coffee    Denied exercise habits     Past Medical History:   Diagnosis Date    Anemia 08/22/2018    Arthritis     AVB (atrioventricular block)     first degree    [facility]     [facility], [facility]     Coronary artery disease     Dislocation of right shoulder joint     Frequent UTI     GERD (gastroesophageal reflux disease)     H/O: pneumonia     Heme positive stool     History of uterine cancer     s/p CALLIE    Hyperlipidemia     Hypertension     Hypothyroidism     Obesity, morbid [facility] 08/22/2018    JANICE on CPAP     Pulmonary hypertension ([address] ) 08/22/2018    Severe aortic stenosis     Simple goiter     Skin cyst     within the armpits, right     Family History   Problem Relation Age of Onset    Diabetes Mother     Stroke Mother     Cancer Father     Lung cancer Father     Diabetes Sister     Heart disease Sister     Coronary artery disease Family     Diabetes Family     Hypertension Family     Cancer Family     Stroke Family      Past Surgical History:   Procedure Laterality Date    BREAST BIOPSY      CARDIAC CATHETERIZATION      CARPAL TUNNEL RELEASE Bilateral     CHOLECYSTECTOMY      DILATION AND CURETTAGE OF UTERUS      HYSTEROSCOPY      MASTOID SURGERY      ME COLONOSCOPY FLX DX W/COLLJ SPEC WHEN PFRMD N/A 9/6/2018    Procedure: COLONOSCOPY;  Surgeon: Hemanth Ruffin MD;  [facility]; Service: Gastroenterology    ME ECHO TRANSESOPHAG R-T 2D W/PRB IMG ACQUISJ I&R N/A 10/9/2018    Procedure: INTRA-OP TRANSESOPHAGEAL ECHOCARDIOGRAM (GARRISON); Surgeon: Kacie Alberto DO;  [facility];  Service: Cardiac Surgery    ME ESOPHAGOGASTRODUODENOSCOPY TRANSORAL DIAGNOSTIC N/A 8/31/2018    Procedure: ESOPHAGOGASTRODUODENOSCOPY (EGD); Surgeon: Hemanth Ruffin MD;  [facility]; Service: Gastroenterology    ME REPLACE AORTIC VALVE OPENFEMORAL ARTERY APPROACH N/A 10/9/2018    Procedure: REPLACEMENT AORTIC VALVE TRANSCATHETER (TAVR) TRANSFEMORAL W/ 23 MM MENDOZA NOE S3 VALVE (ACCESS OF LEFT);   Surgeon: Kacie Alberto DO;  [facility];  Service: Cardiac Surgery    TOTAL ABDOMINAL HYSTERECTOMY      TOTAL HIP ARTHROPLASTY Left 2007    TOTAL KNEE ARTHROPLASTY Bilateral        Current Outpatient Medications:     albuterol (2 5 mg/3 mL) 0 083 % nebulizer solution, Take 1 vial (2 5 mg total) by nebulization every 6 (six) hours as needed for wheezing or shortness of breath, Disp: 360 mL, Rfl: 1    albuterol (PROVENTIL HFA,VENTOLIN HFA) 90 mcg/act inhaler, Inhale 2 puffs every 6 (six) hours as needed for wheezing, Disp: 1 Inhaler, Rfl: 3    aspirin (ECOTRIN LOW STRENGTH) 81 mg EC tablet, Take 1 tablet (81 mg total) by mouth daily, Disp: 100 tablet, Rfl: 0    b complex vitamins capsule, Take 1 capsule by mouth 2 (two) times a day  , Disp: , Rfl:     BREO ELLIPTA 100-25 MCG/INH inhaler, INHALE 1 PUFF DAILY RINSE MOUTH AFTER USE , Disp: 60 each, Rfl: 3    Calcium Carb-Cholecalciferol (CALCIUM 600 + D PO), Take 1 tablet by mouth 2 (two) times a day, Disp: , Rfl:     Fesoterodine Fumarate ER (TOVIAZ) 8 MG TB24, Take 8 mg by mouth daily, Disp: , Rfl:     fluticasone (FLONASE) 50 mcg/act nasal spray, SPRAY 2 SPRAYS INTO EACH NOSTRIL EVERY DAY, Disp: , Rfl: 0    furosemide (LASIX) 20 mg tablet, TAKE 1 TABLET BY MOUTH EVERY DAY, Disp: 90 tablet, Rfl: 1    IRON PO, Take by mouth daily, Disp: , Rfl:     levothyroxine 50 mcg tablet, TAKE 1 TABLET (50 MCG TOTAL) BY MOUTH DAILY, Disp: 30 tablet, Rfl: 5    loratadine (CLARITIN) 10 mg tablet, Take 10 mg by mouth daily, Disp: , Rfl:     metoprolol succinate (TOPROL-XL) 25 mg 24 hr tablet, TAKE 1 TABLET BY MOUTH EVERY DAY, Disp: 30 tablet, Rfl: 2    omeprazole (PriLOSEC) 40 MG capsule, Take 1 capsule (40 mg total) by mouth 2 (two) times a day, Disp: 180 capsule, Rfl: 1    potassium chloride (K-DUR) 10 mEq tablet, TAKE 1 TABLET BY MOUTH 2 TIMES A DAY, Disp: 90 tablet, Rfl: 1    sertraline (ZOLOFT) 50 mg tablet, TAKE 1 5 TABLETS BY MOUTH DAILY, Disp: 45 tablet, Rfl: 3    simvastatin (ZOCOR) 40 mg tablet, Take 1 tablet (40 mg total) by mouth daily at bedtime, Disp: 90 tablet, Rfl: 1    temazepam (RESTORIL) 7 5 mg capsule, Take 1 capsule (7 5 mg total) by mouth daily at bedtime as needed for sleep, Disp: 90 capsule, Rfl: 0    amoxicillin (AMOXIL) 500 mg capsule, Take 1 capsule (500 mg total) by mouth every 8 (eight) hours for 10 days, Disp: 30 capsule, Rfl: 0    mometasone (ELOCON) 0 1 % cream, Apply twice daily to right ear canal for 2 weeks then decrease to qhs or as needed  (Patient not taking: Reported on 9/12/2019), Disp: 45 g, Rfl: 0    ofloxacin (FLOXIN) 0 3 % otic solution, Administer 5 drops into the left ear 2 (two) times a day (Patient not taking: Reported on 9/12/2019), Disp: 10 mL, Rfl: 3    Allergies   Allergen Reactions    Latex Rash    Neosporin [Neomycin-Bacitracin Zn-Polymyx] Rash and Other (See Comments)     hives per [facility] order          Lab Review   Hospital Outpatient Visit on 08/16/2019   Component Date Value    Ventricular Rate 08/16/2019 67     Atrial Rate 08/16/2019 67     [place] Interval 08/16/2019 192     QRSD Interval 08/16/2019 80     QT Interval 08/16/2019 424     QTC Interval 08/16/2019 448     P Axis 08/16/2019 42     QRS H[place] 08/16/2019 -23     T Wave Hensley 08/16/2019 49    Appointment on 08/16/2019   Component Date Value    Sodium 08/16/2019 141     Potassium 08/16/2019 4 4     Chloride 08/16/2019 107     CO2 08/16/2019 29     ANION GAP 08/16/2019 5     BUN 08/16/2019 17     Creatinine 08/16/2019 0 74     Glucose 08/16/2019 78     Calcium 08/16/2019 9 9     eGFR 08/16/2019 77     WBC 08/16/2019 10 43*    RBC 08/16/2019 4 76     Hemoglobin 08/16/2019 13 1     Hematocrit 08/16/2019 43 2     MCV 08/16/2019 91     MCH 08/16/2019 27 5     MCHC 08/16/2019 30 3*    RDW 08/16/2019 14 8     Platelets 21/17/0242 329     MPV 08/16/2019 10 3         Imaging: No results found  Objective:     Physical Exam   Constitutional: She is oriented to person, place, and time  She appears well-developed and well-nourished  No distress  HENT:   Head: Normocephalic and atraumatic  Right Ear: External ear normal    Mouth/Throat: Oropharynx is clear and moist  No oropharyngeal exudate (but very injected )  Left ear still healing with good granulation tissue noted  Eyes: Pupils are equal, round, and reactive to light  Conjunctivae and EOM are normal  Right eye exhibits no discharge  Left eye exhibits no discharge  Neck: Normal range of motion  Neck supple  Cardiovascular: Normal rate and normal heart sounds  Occasional apc noted  Aortic click  Pulmonary/Chest: Effort normal and breath sounds normal  No respiratory distress  She has no wheezes  She has no rales  She exhibits no tenderness  Musculoskeletal:   Ambulates with a walker  Lymphadenopathy:     She has no cervical adenopathy  Neurological: She is alert and oriented to person, place, and time  No cranial nerve deficit  Skin: Skin is warm and dry  No rash noted  She is not diaphoretic  Psychiatric: She has a normal mood and affect  Her behavior is normal  Judgment and thought content normal          Patient Instructions   Discussed all with patient  I would prefer to treat with amoxicillin for the sore throat as patient is also postop status post tumor removed from behind her left ear  Salt water gargles for the throat   Mucinex take Mucinex DM twice daily for the cough  Plenty liquids  Follow up if no better  MABEL Castro    Portions of the record may have been created with voice recognition software   Occasional wrong word or "sound a like" substitutions may have occurred due to the inherent limitations of voice recognition software   Read the chart carefully and recognize, using context, where substitutions have occurred

## 2019-09-12 NOTE — PATIENT INSTRUCTIONS
Discussed all with patient  I would prefer to treat with amoxicillin for the sore throat as patient is also postop status post tumor removed from behind her left ear  Salt water gargles for the throat   Mucinex take Mucinex DM twice daily for the cough  Plenty liquids  Follow up if no better

## 2019-09-17 ENCOUNTER — OFFICE VISIT (OUTPATIENT)
Dept: FAMILY MEDICINE CLINIC | Facility: CLINIC | Age: 79
End: 2019-09-17
Payer: MEDICARE

## 2019-09-17 VITALS
BODY MASS INDEX: 50.67 KG/M2 | RESPIRATION RATE: 18 BRPM | SYSTOLIC BLOOD PRESSURE: 100 MMHG | WEIGHT: 218 LBS | DIASTOLIC BLOOD PRESSURE: 62 MMHG | OXYGEN SATURATION: 92 % | HEART RATE: 52 BPM | TEMPERATURE: 99 F

## 2019-09-17 DIAGNOSIS — J02.9 PHARYNGITIS, UNSPECIFIED ETIOLOGY: Primary | ICD-10-CM

## 2019-09-17 PROCEDURE — 99213 OFFICE O/P EST LOW 20 MIN: CPT | Performed by: FAMILY MEDICINE

## 2019-09-17 RX ORDER — CLOTRIMAZOLE 10 MG/1
10 LOZENGE ORAL; TOPICAL
Qty: 50 TABLET | Refills: 0 | Status: SHIPPED | OUTPATIENT
Start: 2019-09-17 | End: 2019-09-20 | Stop reason: ALTCHOICE

## 2019-09-17 NOTE — PATIENT INSTRUCTIONS
Discussed all with patient  I suspect you have a fungal infection from the [facility] that you are using  When you use the Breo you need to rinse her mouth out with either salt water or some type of mouthwash  For now use the clotrimazole lozenges up to 5 times daily  Finish the antibiotic  If you find the breathing is worse then you need to either return here or go to the ER but right now the lungs sound fine  Call here by Thursday with update on how you are feeling with the throat and the cough  If worse, you need the er

## 2019-09-17 NOTE — PROGRESS NOTES
Assessment/Plan:     Chronic Problems:  No problem-specific Assessment & Plan notes found for this encounter  Visit Diagnosis:  Diagnoses and all orders for this visit:    Pharyngitis, suspect oral candidiasis  Comments: Will treat with chlortrimazole lozenges and advised to finish abx  Gargle after the breo  Orders:  -     clotrimazole (MYCELEX) 10 mg jus; Take 1 tablet (10 mg total) by mouth 5 (five) times a day          Subjective:    Patient ID: Juanito Escamilla is a 77 y o  female  Pt is here with c/o sore throat  Was getting better on abx but started hurting again yesterday  Gargled with salt water and used cough meds for the cough  Cough meds burned her throat  Denies heart burn  Feels her breathing is ok, but was told by the ma that her oxygen was low  Currently on 3 liter n/c  No temp at home  Felt hot when she arrived  Feels sick  Cough is mildly productive  Takes all other meds as directed  No side effects noted  The following portions of the patient's history were reviewed and updated as appropriate: allergies, current medications, past family history, past medical history, past social history, past surgical history and problem list     Review of Systems   Constitutional: Positive for diaphoresis and fatigue  Negative for chills and fever  HENT: Positive for sneezing and sore throat  Negative for congestion, postnasal drip, rhinorrhea, sinus pressure and sinus pain  Respiratory: Positive for cough (but mild cough only)  Negative for shortness of breath and wheezing  Cardiovascular: Negative for chest pain and palpitations  Gastrointestinal: Negative for diarrhea, nausea and vomiting  Genitourinary: Negative  Neurological: Positive for headaches  Negative for dizziness and light-headedness  Psychiatric/Behavioral: Negative for dysphoric mood  The patient is not nervous/anxious  Still sad about her sister            /62   Pulse (!) 52   Temp 99 °F (37 2 °C) Resp 18   Wt 98 9 kg (218 lb)   SpO2 92%   BMI 50 67 kg/m²   Social History     Socioeconomic History    Marital status: Single     Spouse name: Not on file    Number of children: Not on file    Years of education: Not on file    Highest education level: Not on file   Occupational History    Not on file   Social Needs    Financial resource strain: Not on file    Food insecurity:     Worry: Not on file     Inability: Not on file    Transportation needs:     Medical: Not on file     Non-medical: Not on file   Tobacco Use    Smoking status: Never Smoker    Smokeless tobacco: Never Used   Substance and Sexual Activity    Alcohol use: No    Drug use: No    Sexual activity: Never   Lifestyle    Physical activity:     Days per week: Not on file     Minutes per session: Not on file    Stress: Not on file   Relationships    Social connections:     Talks on phone: Not on file     Gets together: Not on file     Attends Rastafari service: Not on file     Active member of club or organization: Not on file     Attends meetings of clubs or organizations: Not on file     Relationship status: Not on file    Intimate partner violence:     Fear of current or ex partner: Not on file     Emotionally abused: Not on file     Physically abused: Not on file     Forced sexual activity: Not on file   Other Topics Concern    Not on file   Social History Narrative    Denied drinking coffee    Denied exercise habits     Past Medical History:   Diagnosis Date    Anemia 08/22/2018    Arthritis     AVB (atrioventricular block)     first degree    [facility]     COPD, mild ([facility]     Coronary artery disease     Dislocation of right shoulder joint     Frequent UTI     GERD (gastroesophageal reflux disease)     H/O: pneumonia     Heme positive stool     History of uterine cancer     s/p CALLIE    Hyperlipidemia     Hypertension     Hypothyroidism     Obesity, morbid ([facility] ) 08/22/2018    JANICE on CPAP     Pulmonary hypertension [facility] 08/22/2018    Severe aortic stenosis     Simple goiter     Skin cyst     within the armpits, right     Family History   Problem Relation Age of Onset    Diabetes Mother     Stroke Mother     Cancer Father     Lung cancer Father     Diabetes Sister     Heart disease Sister     Coronary artery disease Family     Diabetes Family     Hypertension Family     Cancer Family     Stroke Family      Past Surgical History:   Procedure Laterality Date    BREAST BIOPSY      CARDIAC CATHETERIZATION      CARPAL TUNNEL RELEASE Bilateral     CHOLECYSTECTOMY      DILATION AND CURETTAGE OF UTERUS      HYSTEROSCOPY      MASTOID SURGERY      WI COLONOSCOPY FLX DX W/COLLJ SPEC WHEN PFRMD N/A 9/6/2018    Procedure: COLONOSCOPY;  Surgeon: Peter Alonzo MD;  [facility]; Service: Gastroenterology    WI ECHO TRANSESOPHAG R-T 2D W/PRB IMG ACQUISJ I&R N/A 10/9/2018    Procedure: INTRA-OP TRANSESOPHAGEAL ECHOCARDIOGRAM (GARRISON); Surgeon: Nessa Linda DO;  [facility];  Service: Cardiac Surgery    WI ESOPHAGOGASTRODUODENOSCOPY TRANSORAL DIAGNOSTIC N/A 8/31/2018    Procedure: ESOPHAGOGASTRODUODENOSCOPY (EGD); Surgeon: Peter Alonzo MD;  [facility]; Service: Gastroenterology    WI REPLACE AORTIC VALVE OPENFEMORAL ARTERY APPROACH N/A 10/9/2018    Procedure: REPLACEMENT AORTIC VALVE TRANSCATHETER (TAVR) TRANSFEMORAL W/ 23 MM MENDOZA NOE S3 VALVE (ACCESS OF LEFT);   Surgeon: Nessa Linda DO;  [facility];  Service: Cardiac Surgery    TOTAL ABDOMINAL HYSTERECTOMY      TOTAL HIP ARTHROPLASTY Left 2007    TOTAL KNEE ARTHROPLASTY Bilateral        Current Outpatient Medications:     albuterol (2 5 mg/3 mL) 0 083 % nebulizer solution, Take 1 vial (2 5 mg total) by nebulization every 6 (six) hours as needed for wheezing or shortness of breath, Disp: 360 mL, Rfl: 1    albuterol (PROVENTIL HFA,VENTOLIN HFA) 90 mcg/act inhaler, Inhale 2 puffs every 6 (six) hours as needed for wheezing, Disp: 1 Inhaler, Rfl: 3    amoxicillin (AMOXIL) 500 mg capsule, Take 1 capsule (500 mg total) by mouth every 8 (eight) hours for 10 days, Disp: 30 capsule, Rfl: 0    aspirin (ECOTRIN LOW STRENGTH) 81 mg EC tablet, Take 1 tablet (81 mg total) by mouth daily, Disp: 100 tablet, Rfl: 0    b complex vitamins capsule, Take 1 capsule by mouth 2 (two) times a day  , Disp: , Rfl:     BREO ELLIPTA 100-25 MCG/INH inhaler, INHALE 1 PUFF DAILY RINSE MOUTH AFTER USE , Disp: 60 each, Rfl: 3    Calcium Carb-Cholecalciferol (CALCIUM 600 + D PO), Take 1 tablet by mouth 2 (two) times a day, Disp: , Rfl:     clotrimazole (MYCELEX) 10 mg jus, Take 1 tablet (10 mg total) by mouth 5 (five) times a day, Disp: 50 tablet, Rfl: 0    Fesoterodine Fumarate ER (TOVIAZ) 8 MG TB24, Take 8 mg by mouth daily, Disp: , Rfl:     fluticasone (FLONASE) 50 mcg/act nasal spray, SPRAY 2 SPRAYS INTO EACH NOSTRIL EVERY DAY, Disp: , Rfl: 0    furosemide (LASIX) 20 mg tablet, TAKE 1 TABLET BY MOUTH EVERY DAY, Disp: 90 tablet, Rfl: 1    IRON PO, Take by mouth daily, Disp: , Rfl:     levothyroxine 50 mcg tablet, TAKE 1 TABLET (50 MCG TOTAL) BY MOUTH DAILY, Disp: 30 tablet, Rfl: 5    loratadine (CLARITIN) 10 mg tablet, Take 10 mg by mouth daily, Disp: , Rfl:     metoprolol succinate (TOPROL-XL) 25 mg 24 hr tablet, TAKE 1 TABLET BY MOUTH EVERY DAY, Disp: 30 tablet, Rfl: 2    mometasone (ELOCON) 0 1 % cream, Apply twice daily to right ear canal for 2 weeks then decrease to qhs or as needed   (Patient not taking: Reported on 9/12/2019), Disp: 45 g, Rfl: 0    ofloxacin (FLOXIN) 0 3 % otic solution, Administer 5 drops into the left ear 2 (two) times a day (Patient not taking: Reported on 9/12/2019), Disp: 10 mL, Rfl: 3    omeprazole (PriLOSEC) 40 MG capsule, Take 1 capsule (40 mg total) by mouth 2 (two) times a day, Disp: 180 capsule, Rfl: 1    potassium chloride (K-DUR) 10 mEq tablet, TAKE 1 TABLET BY MOUTH 2 TIMES A DAY, Disp: 90 tablet, Rfl: 1    sertraline (ZOLOFT) 50 mg tablet, TAKE 1 5 TABLETS BY MOUTH DAILY, Disp: 45 tablet, Rfl: 3    simvastatin (ZOCOR) 40 mg tablet, Take 1 tablet (40 mg total) by mouth daily at bedtime, Disp: 90 tablet, Rfl: 1    temazepam (RESTORIL) 7 5 mg capsule, Take 1 capsule (7 5 mg total) by mouth daily at bedtime as needed for sleep, Disp: 90 capsule, Rfl: 0    Allergies   Allergen Reactions    Latex Rash    Neosporin [Neomycin-Bacitracin Zn-Polymyx] Rash and Other (See Comments)     hives per [facility] order          Lab Review   Hospital Outpatient Visit on 08/16/2019   Component Date Value    Ventricular Rate 08/16/2019 67     Atrial Rate 08/16/2019 67     RI Interval 08/16/2019 192     QRSD Interval 08/16/2019 80     QT Interval 08/16/2019 424     QTC Interval 08/16/2019 448     P Axis 08/16/2019 42     QRS [place] 08/16/2019 -23     T Wave [place] 08/16/2019 49    Appointment on 08/16/2019   Component Date Value    Sodium 08/16/2019 141     Potassium 08/16/2019 4 4     Chloride 08/16/2019 107     CO2 08/16/2019 29     ANION GAP 08/16/2019 5     BUN 08/16/2019 17     Creatinine 08/16/2019 0 74     Glucose 08/16/2019 78     Calcium 08/16/2019 9 9     eGFR 08/16/2019 77     WBC 08/16/2019 10 43*    RBC 08/16/2019 4 76     Hemoglobin 08/16/2019 13 1     Hematocrit 08/16/2019 43 2     MCV 08/16/2019 91     MCH 08/16/2019 27 5     MCHC 08/16/2019 30 3*    RDW 08/16/2019 14 8     Platelets 60/61/7355 329     MPV 08/16/2019 10 3         Imaging: No results found  Objective:     Physical Exam   Constitutional: She is oriented to person, place, and time  She appears well-developed and well-nourished  No distress  HENT:   Head: Normocephalic and atraumatic  Right Ear: External ear normal    Left tm with granulation tissue obstructing the canal  Oropharynx with white patches on hard palate and pharynx   Suspect fungal     Eyes: Pupils are equal, round, and reactive to light  Conjunctivae and EOM are normal  Right eye exhibits no discharge  Left eye exhibits no discharge  Neck: Normal range of motion  Neck supple  Cardiovascular: Regular rhythm  Murmur heard  Pt is bradycardic  Pulmonary/Chest: Effort normal  No respiratory distress  She has no wheezes  Faint crackles at the bases  Pt currently on 3 l nc  Pulse ox 92%   Musculoskeletal:   Ambulates slowly with a walker  Neurological: She is alert and oriented to person, place, and time  Skin: Skin is warm and dry  She is not diaphoretic  Psychiatric: She has a normal mood and affect  Her behavior is normal  Judgment and thought content normal          Patient Instructions   Discussed all with patient  I suspect you have a fungal infection from the [employer] that you are using  When you use the Breo you need to rinse her mouth out with either salt water or some type of mouthwash  For now use the clotrimazole lozenges up to 5 times daily  Finish the antibiotic  If you find the breathing is worse then you need to either return here or go to the ER but right now the lungs sound fine  Call here by Thursday with update on how you are feeling with the throat and the cough  If worse, you need the er  MABEL Castro    Portions of the record may have been created with voice recognition software   Occasional wrong word or "sound a like" substitutions may have occurred due to the inherent limitations of voice recognition software   Read the chart carefully and recognize, using context, where substitutions have occurred

## 2019-09-19 ENCOUNTER — TELEPHONE (OUTPATIENT)
Dept: FAMILY MEDICINE CLINIC | Facility: CLINIC | Age: 79
End: 2019-09-19

## 2019-09-19 NOTE — TELEPHONE ENCOUNTER
Spoke with patient and she cant take mucinex dm because it burns her throat when she does  I discussed with Cori Hutson and she told patient to try plan Robitussin Dm to help with the cough, also the tea with honey for laryngitis and tylenol if it is really bad   She knows if she gets a fever to call and let us know

## 2019-09-19 NOTE — TELEPHONE ENCOUNTER
Dorothea Mathews called to update Mariana Nereida on how she is feeling  Her throat has gotten a little better but she is loosing her voice, the left side of her throat still is bothering her and she is still coughing

## 2019-09-19 NOTE — TELEPHONE ENCOUNTER
Patient called back after speaking this morning  She said her mouth is sore and her throat  She is coming in tomorrow  I told her to take tylenol to help with her mouth and throat as you advised earlier  Patient is not sure if she has a fever because she is not sure if her temp works or not        I did advise if the tylenol doesn't help and she feels worse tonight to go to the ed if she is uncomfortable

## 2019-09-20 ENCOUNTER — OFFICE VISIT (OUTPATIENT)
Dept: FAMILY MEDICINE CLINIC | Facility: CLINIC | Age: 79
End: 2019-09-20
Payer: MEDICARE

## 2019-09-20 VITALS
WEIGHT: 216.4 LBS | TEMPERATURE: 98.7 F | HEIGHT: 55 IN | OXYGEN SATURATION: 90 % | DIASTOLIC BLOOD PRESSURE: 84 MMHG | SYSTOLIC BLOOD PRESSURE: 144 MMHG | HEART RATE: 97 BPM | BODY MASS INDEX: 50.08 KG/M2

## 2019-09-20 DIAGNOSIS — L08.9 SKIN INFECTION: ICD-10-CM

## 2019-09-20 DIAGNOSIS — K14.8 TONGUE LESION: ICD-10-CM

## 2019-09-20 DIAGNOSIS — J02.9 PHARYNGITIS, UNSPECIFIED ETIOLOGY: Primary | ICD-10-CM

## 2019-09-20 PROCEDURE — 87070 CULTURE OTHR SPECIMN AEROBIC: CPT | Performed by: FAMILY MEDICINE

## 2019-09-20 PROCEDURE — 99214 OFFICE O/P EST MOD 30 MIN: CPT | Performed by: FAMILY MEDICINE

## 2019-09-20 RX ORDER — MUPIROCIN CALCIUM 20 MG/G
CREAM TOPICAL 3 TIMES DAILY
Qty: 15 G | Refills: 0 | Status: SHIPPED | OUTPATIENT
Start: 2019-09-20 | End: 2020-06-22

## 2019-09-20 NOTE — PROGRESS NOTES
bAssessment/Plan:     Chronic Problems:  No problem-specific Assessment & Plan notes found for this encounter  Visit Diagnosis:  Diagnoses and all orders for this visit:    Pharyngitis, unspecified etiology  Comments:  Still suspect candidiasis  Will treat with nystatin swish and swallow  Will call with throat culture  Orders:  -     Throat culture; Future    Tongue lesion  Comments:  Not clear whether fungal or lichen  Will treat with nystatin and if not better will send to oral surgeon  Orders:  -     nystatin (MYCOSTATIN) 500,000 units/5 mL suspension; Apply 5 mL (500,000 Units total) to the mouth or throat 4 (four) times a day    Skin infection behind left ear lobe  Comments:  Behind the left pinna  Given bactroban and advised to pad her eye glases  Orders:  -     mupirocin (BACTROBAN) 2 % cream; Apply topically 3 (three) times a day Behind the left tm  Subjective:    Patient ID: Dev Anton is a 77 y o  female  Pt is here for f/u on her sore throat  Using the lozenges and feels it has not helped  Losing her voice now  Temp yesterday was 99 2  Only rare cough  Ears are ok  Still uses cotton in the left ear canal s/p recent surgery  Also her tongue hurts  Using clotrimazole lozenges but did not use them yesterday  Started again today  Held her breo as her mouth was sore  Takes all other meds as directed  No side effects noted  The following portions of the patient's history were reviewed and updated as appropriate: allergies, current medications, past family history, past medical history, past social history, past surgical history and problem list     Review of Systems   Constitutional: Negative for chills, diaphoresis, fatigue and fever  HENT: Positive for sore throat (and sore tongue  ) and voice change  Negative for congestion, ear pain, postnasal drip and rhinorrhea  Respiratory: Positive for cough (very mild)  Negative for shortness of breath and wheezing  Cardiovascular: Negative for chest pain and palpitations  Gastrointestinal: Negative  Genitourinary: Negative  Neurological: Negative for dizziness, light-headedness and headaches  Psychiatric/Behavioral:        Still sad about the loss 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            /84   Pulse 97   Temp 98 7 °F (37 1 °C) (Tympanic)   Ht 4' 7" (1 397 m)   Wt 98 2 kg (216 lb 6 4 oz)   SpO2 90%   BMI 50 30 kg/m²   Social History     Socioeconomic History    Marital status: Single     Spouse name: Not on file    Number of children: Not on file    Years of education: Not on file    Highest education level: Not on file   Occupational History    Not on file   Social Needs    Financial resource strain: Not on file    Food insecurity:     Worry: Not on file     Inability: Not on file    Transportation needs:     Medical: Not on file     Non-medical: Not on file   Tobacco Use    Smoking status: Never Smoker    Smokeless tobacco: Never Used   Substance and Sexual Activity    Alcohol use: No    Drug use: No    Sexual activity: Never   Lifestyle    Physical activity:     Days per week: Not on file     Minutes per session: Not on file    Stress: Not on file   Relationships    Social connections:     Talks on phone: Not on file     Gets together: Not on file     Attends [employer] service: Not on file     Active member of club or organization: Not on file     Attends meetings of clubs or organizations: Not on file     Relationship status: Not on file    Intimate partner violence:     Fear of current or ex partner: Not on file     Emotionally abused: Not on file     Physically abused: Not on file     Forced sexual activity: Not on file   Other Topics Concern    Not on file   Social History Narrative    Denied drinking coffee    Denied exercise habits     Past Medical History:   Diagnosis Date    Anemia 08/22/2018    Arthritis     AVB (atrioventricular block)     first degree    [facility] failure[facility] )     COPD, mild ([place] [address] )     Coronary artery disease     Dislocation of right shoulder joint     Frequent UTI     GERD (gastroesophageal reflux disease)     H/O: pneumonia     Heme positive stool     History of uterine cancer     s/p CALLIE    Hyperlipidemia     Hypertension     Hypothyroidism     Obesity, morbid ([facility] ) 08/22/2018    JANICE on CPAP     Pulmonary hypertension ([place] [address] ) 08/22/2018    Severe aortic stenosis     Simple goiter     Skin cyst     within the armpits, right     Family History   Problem Relation Age of Onset    Diabetes Mother     Stroke Mother     Cancer Father     Lung cancer Father     Diabetes Sister     Heart disease Sister     Coronary artery disease Family     Diabetes Family     Hypertension Family     Cancer Family     Stroke Family      Past Surgical History:   Procedure Laterality Date    BREAST BIOPSY      CARDIAC CATHETERIZATION      CARPAL TUNNEL RELEASE Bilateral     CHOLECYSTECTOMY      DILATION AND CURETTAGE OF UTERUS      HYSTEROSCOPY      MASTOID SURGERY      NC COLONOSCOPY FLX DX W/COLLJ SPEC WHEN PFRMD N/A 9/6/2018    Procedure: COLONOSCOPY;  Surgeon: German Sosa MD;  Location: [facility]; Service: Gastroenterology    NC ECHO TRANSESOPHAG R-T 2D W/PRB IMG ACQUISJ I&R N/A 10/9/2018    Procedure: INTRA-OP TRANSESOPHAGEAL ECHOCARDIOGRAM (GARRISON); Surgeon: Jolly Cervantes DO;  Location:  MAIN OR;  Service: Cardiac Surgery    NC ESOPHAGOGASTRODUODENOSCOPY TRANSORAL DIAGNOSTIC N/A 8/31/2018    Procedure: ESOPHAGOGASTRODUODENOSCOPY (EGD); Surgeon: German Sosa MD;  [facility]; Service: Gastroenterology    NC REPLACE AORTIC VALVE OPENFEMORAL ARTERY APPROACH N/A 10/9/2018    Procedure: REPLACEMENT AORTIC VALVE TRANSCATHETER (TAVR) TRANSFEMORAL W/ 23 MM MENDOZA NOE S3 VALVE (ACCESS OF LEFT);   Surgeon: Jolly Cervantes DO;  [facility];  Service: Cardiac Surgery    TOTAL ABDOMINAL HYSTERECTOMY      TOTAL HIP ARTHROPLASTY Left 2007    TOTAL KNEE ARTHROPLASTY Bilateral        Current Outpatient Medications:     albuterol (2 5 mg/3 mL) 0 083 % nebulizer solution, Take 1 vial (2 5 mg total) by nebulization every 6 (six) hours as needed for wheezing or shortness of breath, Disp: 360 mL, Rfl: 1    albuterol (PROVENTIL HFA,VENTOLIN HFA) 90 mcg/act inhaler, Inhale 2 puffs every 6 (six) hours as needed for wheezing, Disp: 1 Inhaler, Rfl: 3    amoxicillin (AMOXIL) 500 mg capsule, Take 1 capsule (500 mg total) by mouth every 8 (eight) hours for 10 days, Disp: 30 capsule, Rfl: 0    aspirin (ECOTRIN LOW STRENGTH) 81 mg EC tablet, Take 1 tablet (81 mg total) by mouth daily, Disp: 100 tablet, Rfl: 0    b complex vitamins capsule, Take 1 capsule by mouth 2 (two) times a day  , Disp: , Rfl:     BREO ELLIPTA 100-25 MCG/INH inhaler, INHALE 1 PUFF DAILY RINSE MOUTH AFTER USE , Disp: 60 each, Rfl: 3    Calcium Carb-Cholecalciferol (CALCIUM 600 + D PO), Take 1 tablet by mouth 2 (two) times a day, Disp: , Rfl:     Fesoterodine Fumarate ER (TOVIAZ) 8 MG TB24, Take 8 mg by mouth daily, Disp: , Rfl:     fluticasone (FLONASE) 50 mcg/act nasal spray, SPRAY 2 SPRAYS INTO EACH NOSTRIL EVERY DAY, Disp: , Rfl: 0    furosemide (LASIX) 20 mg tablet, TAKE 1 TABLET BY MOUTH EVERY DAY, Disp: 90 tablet, Rfl: 1    IRON PO, Take by mouth daily, Disp: , Rfl:     levothyroxine 50 mcg tablet, TAKE 1 TABLET (50 MCG TOTAL) BY MOUTH DAILY, Disp: 30 tablet, Rfl: 5    loratadine (CLARITIN) 10 mg tablet, Take 10 mg by mouth daily, Disp: , Rfl:     metoprolol succinate (TOPROL-XL) 25 mg 24 hr tablet, TAKE 1 TABLET BY MOUTH EVERY DAY, Disp: 30 tablet, Rfl: 2    omeprazole (PriLOSEC) 40 MG capsule, Take 1 capsule (40 mg total) by mouth 2 (two) times a day, Disp: 180 capsule, Rfl: 1    potassium chloride (K-DUR) 10 mEq tablet, TAKE 1 TABLET BY MOUTH 2 TIMES A DAY, Disp: 90 tablet, Rfl: 1    sertraline (ZOLOFT) 50 mg tablet, TAKE 1 5 TABLETS BY MOUTH DAILY, Disp: 45 tablet, Rfl: 3    simvastatin (ZOCOR) 40 mg tablet, Take 1 tablet (40 mg total) by mouth daily at bedtime, Disp: 90 tablet, Rfl: 1    temazepam (RESTORIL) 7 5 mg capsule, Take 1 capsule (7 5 mg total) by mouth daily at bedtime as needed for sleep, Disp: 90 capsule, Rfl: 0    mometasone (ELOCON) 0 1 % cream, Apply twice daily to right ear canal for 2 weeks then decrease to qhs or as needed   (Patient not taking: Reported on 9/12/2019), Disp: 45 g, Rfl: 0    mupirocin (BACTROBAN) 2 % cream, Apply topically 3 (three) times a day Behind the left tm , Disp: 15 g, Rfl: 0    nystatin (MYCOSTATIN) 500,000 units/5 mL suspension, Apply 5 mL (500,000 Units total) to the mouth or throat 4 (four) times a day, Disp: 474 mL, Rfl: 0    ofloxacin (FLOXIN) 0 3 % otic solution, Administer 5 drops into the left ear 2 (two) times a day (Patient not taking: Reported on 9/12/2019), Disp: 10 mL, Rfl: 3    Allergies   Allergen Reactions    Latex Rash    Neosporin [Neomycin-Bacitracin Zn-Polymyx] Rash and Other (See Comments)     hives per [facility] order          Lab Review   Hospital Outpatient Visit on 08/16/2019   Component Date Value    Ventricular Rate 08/16/2019 67     Atrial Rate 08/16/2019 67     ND Interval 08/16/2019 192     QRSD Interval 08/16/2019 80     QT Interval 08/16/2019 424     QTC Interval 08/16/2019 448     P Axis 08/16/2019 42     QRS Jolley 08/16/2019 -23     T Wave Jolley 08/16/2019 49    Appointment on 08/16/2019   Component Date Value    Sodium 08/16/2019 141     Potassium 08/16/2019 4 4     Chloride 08/16/2019 107     CO2 08/16/2019 29     ANION GAP 08/16/2019 5     BUN 08/16/2019 17     Creatinine 08/16/2019 0 74     Glucose 08/16/2019 78     Calcium 08/16/2019 9 9     eGFR 08/16/2019 77     WBC 08/16/2019 10 43*    RBC 08/16/2019 4 76     Hemoglobin 08/16/2019 13 1     Hematocrit 08/16/2019 43 2     MCV 08/16/2019 91     MCH 08/16/2019 27 5     MCHC 08/16/2019 30 3*    RDW 08/16/2019 14 8     Platelets 04/00/8858 329     MPV 08/16/2019 10 3         Imaging: No results found  Objective:     Physical Exam   Constitutional: She is oriented to person, place, and time  She appears well-developed and well-nourished  Non-toxic appearance  She does not appear ill  HENT:   Head: Normocephalic and atraumatic  Right Ear: Tympanic membrane normal    S/p surgery on the left tm  Ear canal with granulation tissue  Pharynx is mildly injected but dry  Tongue has linear white patches ? Lichen vs  Fungal     Eyes: Pupils are equal, round, and reactive to light  EOM are normal  Right eye exhibits no discharge  Left eye exhibits no discharge  Neck: Normal range of motion  Neck supple  Pulmonary/Chest: Effort normal and breath sounds normal    On 3 liters n/c  Musculoskeletal:   Ambulates slowly with a walker  Neurological: She is alert and oriented to person, place, and time  Skin: Skin is warm and dry  Area or excoriation with pus drainage behind the left pinna  Seems to be inflammed from her eye glasses  Psychiatric: She has a normal mood and affect  Her behavior is normal  Judgment and thought content normal          Patient Instructions   Discussed all with patient  At this point you could stop the antibiotic I do not believe this is a bacterial infection it still looks fungal   The concern is the white area on the tongue on the left  Switch to nystatin 1 tsp 4 times a day swish around her mouth make sure it gets on the tongue and then swallow  I sent off a throat culture that we will call you with on Monday but I strongly doubt strep  If your mouth is still sore by Tuesday you need to call here as I have to rule out lichen  Will refer to oral surgeon if not better by Tuesday         MABEL Castro    Portions of the record may have been created with voice recognition software   Occasional wrong word or "sound a like" substitutions may have occurred due to the inherent limitations of voice recognition software   Read the chart carefully and recognize, using context, where substitutions have occurred

## 2019-09-20 NOTE — PATIENT INSTRUCTIONS
Discussed all with patient  At this point you could stop the antibiotic I do not believe this is a bacterial infection it still looks fungal   The concern is the white area on the tongue on the left  Switch to nystatin 1 tsp 4 times a day swish around her mouth make sure it gets on the tongue and then swallow  I sent off a throat culture that we will call you with on Monday but I strongly doubt strep  If your mouth is still sore by Tuesday you need to call here as I have to rule out lichen  Will refer to oral surgeon if not better by Tuesday

## 2019-09-21 DIAGNOSIS — Z95.2 S/P TAVR (TRANSCATHETER AORTIC VALVE REPLACEMENT): ICD-10-CM

## 2019-09-21 DIAGNOSIS — I35.0 AORTIC STENOSIS, SEVERE: ICD-10-CM

## 2019-09-22 LAB — BACTERIA THROAT CULT: NORMAL

## 2019-09-22 RX ORDER — SIMVASTATIN 40 MG
40 TABLET ORAL
Qty: 90 TABLET | Refills: 1 | Status: SHIPPED | OUTPATIENT
Start: 2019-09-22 | End: 2020-03-23

## 2019-09-24 ENCOUNTER — TELEPHONE (OUTPATIENT)
Dept: FAMILY MEDICINE CLINIC | Facility: CLINIC | Age: 79
End: 2019-09-24

## 2019-09-24 NOTE — TELEPHONE ENCOUNTER
Informed her the throat culture was negative  She is starting to feel better, still a little cough and her voice is hoarse  Informed to call us if she needs anything else

## 2019-10-02 DIAGNOSIS — I50.32 CHRONIC DIASTOLIC (CONGESTIVE) HEART FAILURE (HCC): ICD-10-CM

## 2019-10-02 RX ORDER — METOPROLOL SUCCINATE 25 MG/1
TABLET, EXTENDED RELEASE ORAL
Qty: 90 TABLET | Refills: 0 | Status: SHIPPED | OUTPATIENT
Start: 2019-10-02 | End: 2020-01-03

## 2019-10-03 ENCOUNTER — OFFICE VISIT (OUTPATIENT)
Dept: CARDIOLOGY CLINIC | Facility: CLINIC | Age: 79
End: 2019-10-03
Payer: MEDICARE

## 2019-10-03 VITALS
HEART RATE: 64 BPM | WEIGHT: 216 LBS | DIASTOLIC BLOOD PRESSURE: 72 MMHG | SYSTOLIC BLOOD PRESSURE: 136 MMHG | OXYGEN SATURATION: 92 % | HEIGHT: 55 IN | BODY MASS INDEX: 49.99 KG/M2

## 2019-10-03 DIAGNOSIS — E78.2 MIXED HYPERLIPIDEMIA: ICD-10-CM

## 2019-10-03 DIAGNOSIS — I10 ESSENTIAL HYPERTENSION: ICD-10-CM

## 2019-10-03 DIAGNOSIS — I27.20 PULMONARY HYPERTENSION (HCC): ICD-10-CM

## 2019-10-03 DIAGNOSIS — I50.32 CHRONIC DIASTOLIC (CONGESTIVE) HEART FAILURE (HCC): Primary | ICD-10-CM

## 2019-10-03 DIAGNOSIS — E66.01 MORBID OBESITY (HCC): ICD-10-CM

## 2019-10-03 DIAGNOSIS — I05.9 MITRAL ANNULAR CALCIFICATION: ICD-10-CM

## 2019-10-03 DIAGNOSIS — G47.33 OSA (OBSTRUCTIVE SLEEP APNEA): ICD-10-CM

## 2019-10-03 DIAGNOSIS — Z95.2 S/P TAVR (TRANSCATHETER AORTIC VALVE REPLACEMENT): ICD-10-CM

## 2019-10-03 DIAGNOSIS — I51.7 LVH (LEFT VENTRICULAR HYPERTROPHY): ICD-10-CM

## 2019-10-03 DIAGNOSIS — I34.2 NON-RHEUMATIC MITRAL VALVE STENOSIS: ICD-10-CM

## 2019-10-03 DIAGNOSIS — I35.0 SEVERE AORTIC STENOSIS: ICD-10-CM

## 2019-10-03 PROCEDURE — 99215 OFFICE O/P EST HI 40 MIN: CPT | Performed by: INTERNAL MEDICINE

## 2019-10-03 RX ORDER — LOSARTAN POTASSIUM 25 MG/1
12.5 TABLET ORAL DAILY
Qty: 45 TABLET | Refills: 1 | Status: SHIPPED | OUTPATIENT
Start: 2019-10-03 | End: 2020-03-23 | Stop reason: SDUPTHER

## 2019-10-03 NOTE — PROGRESS NOTES
CARDIOLOGY OFFICE VISIT  [facility]  [address], [place], [facility]  Tel: [phone]      NAME: Joshua Leung  AGE: 77 y o  SEX: female  : 1940   MRN: [id number]      Chief Complaint:  Chief Complaint   Patient presents with    Follow-up         History of Present Illness:   Patient comes for follow-up  States she still misses her sister who passed away recently  Patient denies chest pain, palpitations, lightheadedness, syncope, swelling feet, orthopnea, PND  Shortness of breath is at baseline  Severe aortic stenosis S/P TAVR on 10/9/18 -  On ASA  Patient is aware of antibiotic prophylaxis prior to dental work    Chronic diastolic congestive heart failure -  Currently euvolemic  On prn furosemide  Cont beta-blocker  Will start low dose ARB       Hypertension, LVH, DD -  Has had it for many years  Takes her medications regularly  Denies lightheadedness, headache, medication side effects        Hyperlipidemia -  Has had it for few years  Takes her medications regularly  Denies myalgia  Her PCP closely monitor the blood work     Mitral stenosis -  Stable  Follow-up with serial echocardiograms    PHTN - from JANICE, valvular disease     Morbid obesity -  Unable to lose weight due to inability to exercise    JANICE - now uses CPAP   She does have history of pulmonary hypertension       Past Medical History:  Past Medical History:   Diagnosis Date    Anemia 2018    Arthritis     AVB (atrioventricular block)     first degree    CHF (congestive heart failure) (HCC)     COPD, mild (HCC)     Coronary artery disease     Dislocation of right shoulder joint     Frequent UTI     GERD (gastroesophageal reflux disease)     H/O: pneumonia     Heme positive stool     History of uterine cancer     s/p CALLIE    Hyperlipidemia     Hypertension     Hypothyroidism     Obesity, morbid ([address] ) 2018    JANICE on CPAP     Pulmonary hypertension ([facility] [address] ) 08/22/2018    Severe aortic stenosis     Simple goiter     Skin cyst     within the armpits, right         Past Surgical History:  Past Surgical History:   Procedure Laterality Date    BREAST BIOPSY      CARDIAC CATHETERIZATION      CARPAL TUNNEL RELEASE Bilateral     CHOLECYSTECTOMY      DILATION AND CURETTAGE OF UTERUS      HYSTEROSCOPY      MASTOID SURGERY      DE COLONOSCOPY FLX DX W/COLLJ SPEC WHEN PFRMD N/A 9/6/2018    Procedure: COLONOSCOPY;  Surgeon: Flaquito Blancas MD;  [facility]: [facility]; Service: Gastroenterology    DE ECHO TRANSESOPHAG R-T 2D W/PRB IMG ACQUISJ I&R N/A 10/9/2018    Procedure: INTRA-OP TRANSESOPHAGEAL ECHOCARDIOGRAM (GARRISON); Surgeon: Catrina Tom DO;  [facility];  Service: Cardiac Surgery    DE ESOPHAGOGASTRODUODENOSCOPY TRANSORAL DIAGNOSTIC N/A 8/31/2018    Procedure: ESOPHAGOGASTRODUODENOSCOPY (EGD); Surgeon: Flaquito Blancas MD;  [facility]; Service: Gastroenterology    DE REPLACE AORTIC VALVE OPENFEMORAL ARTERY APPROACH N/A 10/9/2018    Procedure: REPLACEMENT AORTIC VALVE TRANSCATHETER (TAVR) TRANSFEMORAL W/ 23 MM MENDOZA NOE S3 VALVE (ACCESS OF LEFT);   Surgeon: Catrina Tom DO;  [facility];  Service: Cardiac Surgery    TOTAL ABDOMINAL HYSTERECTOMY      TOTAL HIP ARTHROPLASTY Left 2007    TOTAL KNEE ARTHROPLASTY Bilateral          Family History:  Family History   Problem Relation Age of Onset    Diabetes Mother     Stroke Mother     Cancer Father     Lung cancer Father     Diabetes Sister     Heart disease Sister     Coronary artery disease Family     Diabetes Family     Hypertension Family     Cancer Family     Stroke Family          Social History:  Social History     Socioeconomic History    Marital status: Single     Spouse name: None    Number of children: None    Years of education: None    Highest education level: None   Occupational History    None Social Needs    Financial resource strain: None    Food insecurity:     Worry: None     Inability: None    Transportation needs:     Medical: None     Non-medical: None   Tobacco Use    Smoking status: Never Smoker    Smokeless tobacco: Never Used   Substance and Sexual Activity    Alcohol use: No    Drug use: No    Sexual activity: Never   Lifestyle    Physical activity:     Days per week: None     Minutes per session: None    Stress: None   Relationships    Social connections:     Talks on phone: None     Gets together: None     Attends Oriental orthodox service: None     Active member of club or organization: None     Attends meetings of clubs or organizations: None     Relationship status: None    Intimate partner violence:     Fear of current or ex partner: None     Emotionally abused: None     Physically abused: None     Forced sexual activity: None   Other Topics Concern    None   Social History Narrative    Denied drinking coffee    Denied exercise habits         Active Problems:  Patient Active Problem List   Diagnosis    Hypothyroid    Hypertension    Hyperlipidemia    Reactive airway disease without complication    JANICE (obstructive sleep apnea)    LVH (left ventricular hypertrophy)    Mitral annular calcification    Mitral valve stenosis    Pulmonary hypertension (HCC)    Shortness of breath    Chronic diastolic (congestive) heart failure (HCC)    Anemia    Obesity, morbid (HCC)    Gastroesophageal reflux disease without esophagitis    Arthritis    AVB (atrioventricular block)    Chronic diastolic congestive heart failure (HCC)    COPD, mild (HCC)    Coronary artery disease    JANICE on CPAP    S/P TAVR (transcatheter aortic valve replacement)    Acute pulmonary insufficiency    Postoperative anemia due to acute blood loss    Encounter for postoperative care    Depression with anxiety    Chronic otitis media         The following portions of the patient's history were reviewed and updated as appropriate: past medical history, past surgical history, past family history,  past social history, current medications, allergies and problem list       Review of Systems:  Constitutional: Denies fever, chills  +fatigue  Eyes: Denies eye redness, eye discharge, double vision  ENT: Denies hearing loss, tinnitus, sneezing, nasal discharge, sore throat   Respiratory: Denies cough, expectoration, hemoptysis  +shortness of breath  Cardiovascular: Denies chest pain, palpitations, orthopnea, PND, lower extremity swelling  Gastrointestinal: Denies abdominal pain, nausea, vomiting, hematemesis, diarrhea, bloody stools  Genito-Urinary: Denies dysuria, incontinence  Musculoskeletal: Denies back pain, joint pain, muscle pain  Neurologic: Denies confusion, lightheadedness, syncope, headache, focal weakness, sensory changes, seizures  Endocrine: Denies polyuria, polydipsia, temperature intolerance  Allergy and Immunology: Denies hives, insect bite sensitivity  Hematological and Lymphatic: Denies bleeding problems, swollen glands   Psychological: Denies depression, suicidal ideation, anxiety, panic  Dermatological: Denies pruritus, rash, skin lesion changes      Vitals:  Vitals:    10/03/19 1303   BP: 136/72   Pulse: 64   SpO2: 92%       Body mass index is 50 2 kg/m²  Weight (last 2 days)     Date/Time   Weight    10/03/19 1303   98 (216)                Physical Examination:  General:   Morbidly obese  On O2  Using a walker for support  Patient is not in acute distress  Awake, alert, oriented in time, place and person  Responding to commands  Head: Normocephalic  Atraumatic  Eyes: Both pupils normal sized, round and reactive to light  Nonicteric  ENT: Normal external ear canals  Nares normal, no drainage  Lips and oral mucosa normal  Neck: Supple  JVP not raised   Trachea central  No thyromegaly  Lungs: Bilateral bronchovascular breath sounds with no crackles or rhonchi  Chest wall: No tenderness  Cardiovascular: RRR  Rich prosthetic valve sound+  Gastrointestinal: Abdomen soft, nontender  No guarding or rigidity  Liver and spleen not palpable  Bowel sounds present  Neurologic: Patient is awake, alert, oriented in time, place and person  Responding to command  Moving all extremities  Integumentary:  No skin rash  Lymphatic: No cervical lymphadenopathy  Back: Symmetric   No CVA tenderness  Extremities: No clubbing, cyanosis or edema      Laboratory Results:  CBC with diff:   Lab Results   Component Value Date    WBC 10 43 (H) 2019    RBC 4 76 2019    HGB 13 1 2019    HCT 43 2 2019    MCV 91 2019    MCH 27 5 2019    RDW 14 8 2019     2019       CMP:  Lab Results   Component Value Date    CREATININE 0 74 2019    BUN 17 2019    K 4 4 2019     2019    CO2 29 2019    CO2 32 10/09/2018    GLUCOSE 101 10/09/2018    ALKPHOS 84 2019    ALT 22 2019    AST 27 2019       Lab Results   Component Value Date    HGBA1C 5 7 2018    MG 2 0 2018       Lab Results   Component Value Date    TROPONINI <0 02 2018    TROPONINI <0 02 2018    TROPONINI <0 02 2018       Lipid Profile:   No results found for: CHOL  Lab Results   Component Value Date    HDL 59 08/15/2018    HDL 65 (H) 2018     Lab Results   Component Value Date    LDLCALC 72 08/15/2018    LDLCALC 51 2018     Lab Results   Component Value Date    TRIG 112 08/15/2018    TRIG 83 2018       Cardiac testing:   Results for orders placed during the hospital encounter of 18   Echo complete with contrast if indicated    Narrative [address]  [place] [id number] [phone]    Transthoracic Echocardiogram  2D, M-mode, Doppler, and Color Doppler    Study date:  14-Aug-2018    Patient: Fernandez Flores  MR number: [id number]  Account number: [de-identified]  : 1940  Age: 65 years  Gender: Female  Status: Inpatient  Location: Bedside  Height: 57 in  Weight: 224 lb  BP: 139/ 60 mmHg    Indications: Dyspnea, shortness of breath    Diagnoses: R06 00 - Dyspnea, unspecified    Sonographer:  Suzy Nuñez RDCS  Interpreting Physician:  Janis Moreno MD  Referring Physician:  Gallo Lipscomb PA-C  Group:  [facility]    SUMMARY    LEFT VENTRICLE:  Systolic function was normal  Ejection fraction was estimated in the range of 55 % to 65 %  There were no regional wall motion abnormalities  There was mild concentric hypertrophy  Doppler parameters were consistent with abnormal left ventricular relaxation (grade 1 diastolic dysfunction)  LEFT ATRIUM:  The atrium was mildly dilated  MITRAL VALVE:  There was mild annular calcification  AORTIC VALVE:  The valve was not visualized well enough to rule out a bicuspid morphology  Leaflets exhibited marked calcification and markedly reduced cuspal separation  Transaortic velocity was increased due to valvular stenosis  There was moderate to severe stenosis  There was mild regurgitation  TRICUSPID VALVE:  There was mild regurgitation  Estimated peak PA pressure was 40 mmHg  HISTORY: PRIOR HISTORY: Risk factors: CAD, hypertension, hypercholesterolemia, JANICE and morbid obesity  PROCEDURE: The procedure was performed at the bedside  This was a routine study  The transthoracic approach was used  The study included complete 2D imaging, M-mode, complete spectral Doppler, and color Doppler  The heart rate was 66 bpm,  at the start of the study  Images were obtained from the parasternal, apical, subcostal, and suprasternal notch acoustic windows  Echocardiographic views were limited due to decreased penetration and lung interference  Image quality was  adequate  LEFT VENTRICLE: Size was normal  Systolic function was normal  Ejection fraction was estimated in the range of 55 % to 65 %   There were no regional wall motion abnormalities  There was mild concentric hypertrophy  DOPPLER: Doppler  parameters were consistent with abnormal left ventricular relaxation (grade 1 diastolic dysfunction)  RIGHT VENTRICLE: The size was normal  Systolic function was normal  Wall thickness was normal     LEFT ATRIUM: The atrium was mildly dilated  RIGHT ATRIUM: Size was normal     MITRAL VALVE: There was mild annular calcification  Valve structure was normal  There was normal leaflet separation  DOPPLER: The transmitral velocity was within the normal range  There was no evidence for stenosis  There was no  regurgitation  AORTIC VALVE: The valve was not visualized well enough to rule out a bicuspid morphology  Leaflets exhibited marked calcification and markedly reduced cuspal separation  DOPPLER: Transaortic velocity was increased due to valvular stenosis  There was moderate to severe stenosis  There was mild regurgitation  TRICUSPID VALVE: The valve structure was normal  There was normal leaflet separation  DOPPLER: The transtricuspid velocity was within the normal range  There was no evidence for stenosis  There was mild regurgitation  Estimated peak PA  pressure was 40 mmHg  PULMONIC VALVE: Leaflets exhibited normal thickness, no calcification, and normal cuspal separation  DOPPLER: The transpulmonic velocity was within the normal range  There was no regurgitation  PERICARDIUM: There was no pericardial effusion  The pericardium was normal in appearance  AORTA: The root exhibited normal size  SYSTEMIC VEINS: IVC: The inferior vena cava was normal in size and course   Respirophasic changes were normal     SYSTEM MEASUREMENT TABLES    2D  %FS: 36 9 %  Ao Diam: 3 2 cm  EDV(Teich): 89 2 ml  EF(Teich): 67 %  ESV(Teich): 29 5 ml  IVSd: 1 2 cm  LA Area: 25 9 cm2  LA Diam: 4 1 cm  LVEDV MOD A4C: 83 7 ml  LVEF MOD A4C: 83 2 %  LVESV MOD A4C: 14 ml  LVIDd: 4 4 cm  LVIDs: 2 8 cm  LVLd A4C: 8 2 cm  LVLs A4C: 5 9 cm  LVOT Diam: 2 1 cm  LVPWd: 1 2 cm  RA Area: 18 4 cm2  RVIDd: 4 4 cm  SV MOD A4C: 69 6 ml  SV(Teich): 59 7 ml    CW  AR Dec [place]: 2 4 m/s2  AR Dec Time: 1508 3 ms  AR PHT: 437 4 ms  AR Vmax: 3 6 m/s  AR maxP 4 mmHg  AV Env  Ti: 296 9 ms  AV VTI: 90 6 cm  AV Vmax: 4 1 m/s  AV Vmean: 3 1 m/s  AV maxP 1 mmHg  AV meanP 1 mmHg  TR Vmax: 3 3 m/s  TR maxP 3 mmHg    MM  TAPSE: 2 4 cm    PW  VASU (VTI): 1 3 cm2  VASU Vmax: 1 cm2  E': 0 1 m/s  E/E': 18  LVOT Env  Ti: 388 2 ms  LVOT VTI: 34 7 cm  LVOT Vmax: 1 3 m/s  LVOT Vmean: 0 9 m/s  LVOT maxP 6 mmHg  LVOT meanPG: 3 8 mmHg  LVSV Dopp: 114 6 ml  MV A Jose: 1 4 m/s  MV Dec [place]: 1 8 m/s2  MV DecT: 541 5 ms  MV E Jose: 1 m/s  MV E/A Ratio: 0 7  MV PHT: 157 ms  MVA By PHT: 1 4 cm2    [facility] Accredited Echocardiography Laboratory    Prepared and electronically signed by    Wayne Knapp MD  Signed 14-Aug-2018 18:35:14         Medications:    Current Outpatient Medications:     aspirin (ECOTRIN LOW STRENGTH) 81 mg EC tablet, Take 1 tablet (81 mg total) by mouth daily, Disp: 100 tablet, Rfl: 0    b complex vitamins capsule, Take 1 capsule by mouth 2 (two) times a day  , Disp: , Rfl:     Calcium Carb-Cholecalciferol (CALCIUM 600 + D PO), Take 1 tablet by mouth 2 (two) times a day, Disp: , Rfl:     Fesoterodine Fumarate ER (TOVIAZ) 8 MG TB24, Take 8 mg by mouth daily, Disp: , Rfl:     IRON PO, Take by mouth daily, Disp: , Rfl:     levothyroxine 50 mcg tablet, TAKE 1 TABLET (50 MCG TOTAL) BY MOUTH DAILY, Disp: 30 tablet, Rfl: 5    loratadine (CLARITIN) 10 mg tablet, Take 10 mg by mouth daily, Disp: , Rfl:     metoprolol succinate (TOPROL-XL) 25 mg 24 hr tablet, TAKE 1 TABLET BY MOUTH EVERY DAY, Disp: 90 tablet, Rfl: 0    nystatin (MYCOSTATIN) 500,000 units/5 mL suspension, Apply 5 mL (500,000 Units total) to the mouth or throat 4 (four) times a day, Disp: 474 mL, Rfl: 0    sertraline (ZOLOFT) 50 mg tablet, TAKE 1 5 TABLETS BY MOUTH DAILY, Disp: 45 tablet, Rfl: 3    simvastatin (ZOCOR) 40 mg tablet, TAKE 1 TABLET (40 MG TOTAL) BY MOUTH DAILY AT BEDTIME, Disp: 90 tablet, Rfl: 1    temazepam (RESTORIL) 7 5 mg capsule, Take 1 capsule (7 5 mg total) by mouth daily at bedtime as needed for sleep, Disp: 90 capsule, Rfl: 0    albuterol (2 5 mg/3 mL) 0 083 % nebulizer solution, Take 1 vial (2 5 mg total) by nebulization every 6 (six) hours as needed for wheezing or shortness of breath, Disp: 360 mL, Rfl: 1    albuterol (PROVENTIL HFA,VENTOLIN HFA) 90 mcg/act inhaler, Inhale 2 puffs every 6 (six) hours as needed for wheezing, Disp: 1 Inhaler, Rfl: 3    BREO ELLIPTA 100-25 MCG/INH inhaler, INHALE 1 PUFF DAILY RINSE MOUTH AFTER USE  (Patient not taking: Reported on 10/3/2019), Disp: 60 each, Rfl: 3    fluticasone (FLONASE) 50 mcg/act nasal spray, SPRAY 2 SPRAYS INTO EACH NOSTRIL EVERY DAY, Disp: , Rfl: 0    furosemide (LASIX) 20 mg tablet, TAKE 1 TABLET BY MOUTH EVERY DAY (Patient not taking: Reported on 10/3/2019), Disp: 90 tablet, Rfl: 1    mometasone (ELOCON) 0 1 % cream, Apply twice daily to right ear canal for 2 weeks then decrease to qhs or as needed  (Patient not taking: Reported on 9/12/2019), Disp: 45 g, Rfl: 0    mupirocin (BACTROBAN) 2 % cream, Apply topically 3 (three) times a day Behind the left tm , Disp: 15 g, Rfl: 0    ofloxacin (FLOXIN) 0 3 % otic solution, Administer 5 drops into the left ear 2 (two) times a day (Patient not taking: Reported on 9/12/2019), Disp: 10 mL, Rfl: 3    omeprazole (PriLOSEC) 40 MG capsule, Take 1 capsule (40 mg total) by mouth 2 (two) times a day, Disp: 180 capsule, Rfl: 1    potassium chloride (K-DUR) 10 mEq tablet, TAKE 1 TABLET BY MOUTH 2 TIMES A DAY (Patient not taking: Reported on 10/3/2019), Disp: 90 tablet, Rfl: 1      Allergies:   Allergies   Allergen Reactions    Latex Rash    Neosporin [Neomycin-Bacitracin Zn-Polymyx] Rash and Other (See Comments)     hives per [facility] order EKG:  Read by me   06/04/2019  Sinus rhythm  LAD  Poor R-wave progression in precordial leads  LVH    Assessment and Plan:  1  Severe aortic stenosis S/P TAVR   continue aspirin  Aware that she needs antibiotic prophylaxis prior to dental work    2  Chronic diastolic (congestive) heart failure (HCC)   low-salt diet  Daily weight  On prn furosemide and K  Cont beta-blocker  Low dose ARB added    3  Mitral annular calcification, Non-rheumatic mitral valve stenosis   follow up with serial echocardiograms    4  Essential hypertension   BP stable  Cont beta-blocker    5  Mixed hyperlipidemia   continue statin and diet control  Her PCP closely monitors her blood work    6  LVH (left ventricular hypertrophy)   tight BP control    7  Pulmonary hypertension (HCC)   likely from JANICE, MS, AS    8  Morbid obesity ([address] )   unable to lose weight    9  JANICE (obstructive sleep apnea)   regular CPAP use promoted    Recommend aggressive risk factor modification and therapeutic lifestyle changes  Low-salt, low-calorie, low-fat, low-cholesterol diet with regular exercise and to optimize weight  I will defer the ordering and monitoring of necessity lab studies to you, but I am available and happy to review and manage any of the data at your request in the future  Discussed concepts of atherosclerosis, including signs and symptoms of cardiac disease  Previous studies were reviewed  Safety measures were reviewed  Questions were entertained and answered  Patient was advised to report any problems requiring medical attention  Follow-up with PCP and appropriate specialist and lab work as discussed  Return for follow up visit as scheduled or earlier, if needed  Thank you for allowing me to participate in the care and evaluation of your patient  Should you have any questions, please feel free to contact me        Angela Dixon MD  10/3/2019,3:50 PM

## 2019-10-12 DIAGNOSIS — E03.9 ACQUIRED HYPOTHYROIDISM: ICD-10-CM

## 2019-10-12 RX ORDER — LEVOTHYROXINE SODIUM 0.05 MG/1
TABLET ORAL
Qty: 90 TABLET | Refills: 1 | Status: SHIPPED | OUTPATIENT
Start: 2019-10-12 | End: 2020-04-09

## 2019-10-15 DIAGNOSIS — J30.89 ENVIRONMENTAL AND SEASONAL ALLERGIES: Primary | ICD-10-CM

## 2019-10-15 DIAGNOSIS — I35.0 AORTIC STENOSIS, SEVERE: ICD-10-CM

## 2019-10-15 DIAGNOSIS — Z95.2 S/P TAVR (TRANSCATHETER AORTIC VALVE REPLACEMENT): ICD-10-CM

## 2019-10-15 DIAGNOSIS — G47.00 INSOMNIA, UNSPECIFIED TYPE: ICD-10-CM

## 2019-10-15 RX ORDER — TEMAZEPAM 7.5 MG/1
7.5 CAPSULE ORAL
Qty: 90 CAPSULE | Refills: 0 | Status: SHIPPED | OUTPATIENT
Start: 2019-10-15 | End: 2020-01-23 | Stop reason: SDUPTHER

## 2019-10-15 RX ORDER — LORATADINE 10 MG/1
10 TABLET ORAL DAILY
Qty: 90 TABLET | Refills: 1 | Status: SHIPPED | OUTPATIENT
Start: 2019-10-15 | End: 2019-10-18 | Stop reason: SDUPTHER

## 2019-10-15 RX ORDER — SIMVASTATIN 40 MG
40 TABLET ORAL
Qty: 90 TABLET | Refills: 1 | Status: CANCELLED | OUTPATIENT
Start: 2019-10-15

## 2019-10-16 DIAGNOSIS — R06.02 SHORTNESS OF BREATH: ICD-10-CM

## 2019-10-18 DIAGNOSIS — J30.89 ENVIRONMENTAL AND SEASONAL ALLERGIES: ICD-10-CM

## 2019-10-20 DIAGNOSIS — F41.8 DEPRESSION WITH ANXIETY: ICD-10-CM

## 2019-10-20 RX ORDER — LORATADINE 10 MG/1
10 TABLET ORAL DAILY
Qty: 90 TABLET | Refills: 1 | Status: SHIPPED | OUTPATIENT
Start: 2019-10-20 | End: 2020-10-05

## 2019-10-24 ENCOUNTER — TELEPHONE (OUTPATIENT)
Dept: CARDIOLOGY CLINIC | Facility: CLINIC | Age: 79
End: 2019-10-24

## 2019-10-24 NOTE — TELEPHONE ENCOUNTER
Pt called and has a couple of questions regarding Furosamide & Losartan   Please give pt a call back at  [phone]- Afshan Hendricks

## 2019-10-24 NOTE — TELEPHONE ENCOUNTER
S/w patient and she is confused as to why she was prescribed furosemide and losartan potasium  I informed her per last office note;   Chronic diastolic (congestive) heart failure (HCC)   low-salt diet  Daily weight  On prn furosemide and K  Cont beta-blocker  Low dose ARB added    Please advise

## 2019-10-24 NOTE — TELEPHONE ENCOUNTER
S/w pt and advised her at her next office visit this will be further discussed and pt should continue taking meds as per Dr Darby Score  Verbal understanding was given

## 2019-10-24 NOTE — TELEPHONE ENCOUNTER
I explained this to the PT and she said "she never knew she had CHF"  Should she come in to further discuss this?

## 2019-11-20 RX ORDER — FLUTICASONE FUROATE AND VILANTEROL TRIFENATATE 100; 25 UG/1; UG/1
POWDER RESPIRATORY (INHALATION)
Qty: 1 INHALER | Refills: 3 | Status: SHIPPED | OUTPATIENT
Start: 2019-11-20 | End: 2020-10-12

## 2019-11-20 NOTE — TELEPHONE ENCOUNTER
She said she doesn't need it now but thinks she should have one   She thinks the reason she had that is because she wasn't rinsing out her mouth after use

## 2019-11-20 NOTE — TELEPHONE ENCOUNTER
Patient said she is not on this because you told her to stop using it because of the sore in mouth  She said Pulmonology doctor is not aware  I told her she should call [facility] and let them know because they might want to move your apt up

## 2019-11-21 ENCOUNTER — OFFICE VISIT (OUTPATIENT)
Dept: FAMILY MEDICINE CLINIC | Facility: CLINIC | Age: 79
End: 2019-11-21
Payer: MEDICARE

## 2019-11-21 VITALS
OXYGEN SATURATION: 91 % | HEIGHT: 55 IN | BODY MASS INDEX: 50.68 KG/M2 | RESPIRATION RATE: 18 BRPM | TEMPERATURE: 98.2 F | DIASTOLIC BLOOD PRESSURE: 50 MMHG | HEART RATE: 72 BPM | SYSTOLIC BLOOD PRESSURE: 130 MMHG | WEIGHT: 219 LBS

## 2019-11-21 DIAGNOSIS — I10 ESSENTIAL HYPERTENSION: Chronic | ICD-10-CM

## 2019-11-21 DIAGNOSIS — Z95.2 S/P TAVR (TRANSCATHETER AORTIC VALVE REPLACEMENT): ICD-10-CM

## 2019-11-21 DIAGNOSIS — E66.01 OBESITY, MORBID (HCC): Chronic | ICD-10-CM

## 2019-11-21 DIAGNOSIS — I50.32 CHRONIC DIASTOLIC CONGESTIVE HEART FAILURE (HCC): Chronic | ICD-10-CM

## 2019-11-21 DIAGNOSIS — Z01.818 PREOP EXAMINATION: Primary | ICD-10-CM

## 2019-11-21 PROCEDURE — 99214 OFFICE O/P EST MOD 30 MIN: CPT | Performed by: FAMILY MEDICINE

## 2019-11-21 RX ORDER — FUROSEMIDE 20 MG/1
20 TABLET ORAL 2 TIMES DAILY
Qty: 30 TABLET | Refills: 3
Start: 2019-11-21 | End: 2020-01-14 | Stop reason: SDUPTHER

## 2019-11-21 NOTE — ASSESSMENT & PLAN NOTE
Wt Readings from Last 3 Encounters:   11/21/19 99 3 kg (219 lb)   10/03/19 98 kg (216 lb)   09/20/19 98 2 kg (216 lb 6 4 oz)       Stay on the current meds  Keep the f/u with Dr Elder Cheadle

## 2019-11-21 NOTE — PROGRESS NOTES
PRE-OPERATIVE EVALUATION  [address]    NAME: Manoj Juárez  AGE: 77 y o  SEX: female  : 1940     DATE: 2019    Chief Complaint: Pre-operative Evaluation     Surgery: Phaco with IOL right eye > left eye  Anticipated Date of Surgery:  and 19  Referring Provider: Dr Yahir Jennings     History of Present Illness:     Manoj Juárez is a 77 y o  female who presents to the office today for a preoperative consultation at the request of surgeon Dr Yahir Jennings, who plans on performing Phaco with IOL right eye > left eye on  and   Planned anesthesia is MAC  Patient has a bleeding risk of: no abnormal bleeding  Patient does not have objections to receiving blood products if needed  Current anti-platelet/anti-coagulation medications that the patient is prescribed includes: Aspirin     Assessment of Chronic Conditions:   Hypertension  Diastolic Heart Failure  Hyperlipidemia     Assessment of Cardiac Risk:  · Denies unstable or severe angina or MI in the last 6 weeks or history of stent placement in the last year   · Denies decompensated heart failure (e g  New onset heart failure, NYHA functional class IV heart failure, or worsening existing heart failure)  · Denies significant arrhythmias such as high grade AV block, symptomatic ventricular arrhythmia, newly recognized ventricular tachycardia, supraventricular tachycardia with resting heart rate >100, or symptomatic bradycardia  · Pt has recent TAVR  For  Severe aortic stenosis      Exercise Capacity:  · Able to walk 4 blocks without symptoms?: No, Uses a cane and will develop sob  · Able to walk 2 flights without symptoms?: No, Will have sob    Prior Anesthesia Reactions: Yes, was sweating after her knee surgery     Personal history of venous thromboembolic disease? No    History of steroid use for >2 weeks within last year?  No    STOP-BANG Sleep Apnea Screening Questionnaire:         Review of Systems:     Review of Systems   Constitutional: Negative for chills, diaphoresis, fatigue and fever  HENT: Positive for hearing loss (on the left s/p tumor resection)  Negative for congestion, ear discharge, ear pain, sinus pressure, sinus pain, sneezing and sore throat  Eyes: Negative  Respiratory: Positive for shortness of breath (with exertion  Recently started on lasix by Dr Francis Rowland)  Negative for cough and wheezing  Cardiovascular: Negative for chest pain and palpitations  Gastrointestinal: Negative  Genitourinary: Negative  On toviaz by Dr Neha Nicholson  Musculoskeletal: Positive for arthralgias (in the legs at times  ) and gait problem (Ambulates with a cane)  Negative for joint swelling  Neurological: Negative for dizziness, light-headedness and headaches  Still does not feel right in the head  Psychiatric/Behavioral: Positive for dysphoric mood (at times about sister that recently passed away  )  The patient is not nervous/anxious  Current Problem List:     Patient Active Problem List   Diagnosis    Hypothyroid    Hypertension    Hyperlipidemia    Reactive airway disease without complication    JANICE (obstructive sleep apnea)    LVH (left ventricular hypertrophy)    Mitral annular calcification    Mitral valve stenosis    Pulmonary hypertension (HCC)    Shortness of breath    Chronic diastolic (congestive) heart failure (HCC)    Anemia    Obesity, morbid (HCC)    Gastroesophageal reflux disease without esophagitis    Arthritis    AVB (atrioventricular block)    Chronic diastolic congestive heart failure (HCC)    COPD, mild (HCC)    Coronary artery disease    JANICE on CPAP    S/P TAVR (transcatheter aortic valve replacement)    Acute pulmonary insufficiency    Postoperative anemia due to acute blood loss    Encounter for postoperative care    Depression with anxiety    Chronic otitis media       Allergies:      Allergies   Allergen Reactions    Latex Rash    Neosporin [Neomycin-Bacitracin Zn-Polymyx] Rash and Other (See Comments)     hives per [facility] order       Physical Exam:       Current Outpatient Medications:     aspirin (ECOTRIN LOW STRENGTH) 81 mg EC tablet, Take 1 tablet (81 mg total) by mouth daily, Disp: 100 tablet, Rfl: 0    b complex vitamins capsule, Take 1 capsule by mouth 2 (two) times a day  , Disp: , Rfl:     BREO ELLIPTA 100-25 MCG/INH inhaler, INHALE 1 PUFF DAILY RINSE MOUTH AFTER USE , Disp: 1 Inhaler, Rfl: 3    Calcium Carb-Cholecalciferol (CALCIUM 600 + D PO), Take 1 tablet by mouth 2 (two) times a day, Disp: , Rfl:     Fesoterodine Fumarate ER (TOVIAZ) 8 MG TB24, Take 8 mg by mouth daily, Disp: , Rfl:     levothyroxine 50 mcg tablet, TAKE 1 TABLET (50 MCG TOTAL) BY MOUTH DAILY, Disp: 90 tablet, Rfl: 1    loratadine (CLARITIN) 10 mg tablet, Take 1 tablet (10 mg total) by mouth daily, Disp: 90 tablet, Rfl: 1    losartan (COZAAR) 25 mg tablet, Take 0 5 tablets (12 5 mg total) by mouth daily, Disp: 45 tablet, Rfl: 1    metoprolol succinate (TOPROL-XL) 25 mg 24 hr tablet, TAKE 1 TABLET BY MOUTH EVERY DAY, Disp: 90 tablet, Rfl: 0    mupirocin (BACTROBAN) 2 % cream, Apply topically 3 (three) times a day Behind the left tm , Disp: 15 g, Rfl: 0    omeprazole (PriLOSEC) 40 MG capsule, Take 1 capsule (40 mg total) by mouth 2 (two) times a day, Disp: 180 capsule, Rfl: 1    sertraline (ZOLOFT) 50 mg tablet, TAKE 1 5 TABLETS BY MOUTH DAILY, Disp: 135 tablet, Rfl: 1    simvastatin (ZOCOR) 40 mg tablet, TAKE 1 TABLET (40 MG TOTAL) BY MOUTH DAILY AT BEDTIME, Disp: 90 tablet, Rfl: 1    temazepam (RESTORIL) 7 5 mg capsule, Take 1 capsule (7 5 mg total) by mouth daily at bedtime as needed for sleep, Disp: 90 capsule, Rfl: 0    albuterol (2 5 mg/3 mL) 0 083 % nebulizer solution, Take 1 vial (2 5 mg total) by nebulization every 6 (six) hours as needed for wheezing or shortness of breath (Patient not taking: Reported on 11/21/2019), Disp: 360 mL, Rfl: 1    albuterol (PROVENTIL HFA,VENTOLIN HFA) 90 mcg/act inhaler, Inhale 2 puffs every 6 (six) hours as needed for wheezing (Patient not taking: Reported on 11/21/2019), Disp: 1 Inhaler, Rfl: 3    fluticasone (FLONASE) 50 mcg/act nasal spray, SPRAY 2 SPRAYS INTO EACH NOSTRIL EVERY DAY, Disp: , Rfl: 0    IRON PO, Take by mouth daily, Disp: , Rfl:     nystatin (MYCOSTATIN) 500,000 units/5 mL suspension, Apply 5 mL (500,000 Units total) to the mouth or throat 4 (four) times a day, Disp: 474 mL, Rfl: 0    Past Medical History:       Past Medical History:   Diagnosis Date    Anemia 08/22/2018    Arthritis     AVB (atrioventricular block)     first degree    CHF (congestive heart failure) (HCC)     COPD, mild (HCC)     Coronary artery disease     Dislocation of right shoulder joint     Frequent UTI     GERD (gastroesophageal reflux disease)     H/O: pneumonia     Heme positive stool     History of uterine cancer     s/p CALLIE    Hyperlipidemia     Hypertension     Hypothyroidism     Obesity, morbid ([facility] [address] ) 08/22/2018    JANICE on CPAP     Pulmonary hypertension ([facility] [address] ) 08/22/2018    Severe aortic stenosis     Simple goiter     Skin cyst     within the armpits, right        Past Surgical History:   Procedure Laterality Date    BREAST BIOPSY      CARDIAC CATHETERIZATION      CARPAL TUNNEL RELEASE Bilateral     CHOLECYSTECTOMY      DILATION AND CURETTAGE OF UTERUS      HYSTEROSCOPY      MASTOID SURGERY      OR COLONOSCOPY FLX DX W/COLLJ SPEC WHEN PFRMD N/A 9/6/2018    Procedure: COLONOSCOPY;  Surgeon: Brent Dangelo MD;  Location: [facility]; Service: Gastroenterology    OR ECHO TRANSESOPHAG R-T 2D W/PRB IMG ACQUISJ I&R N/A 10/9/2018    Procedure: INTRA-OP TRANSESOPHAGEAL ECHOCARDIOGRAM (GARRISON);   Surgeon: Jason Sheldon DO;  [facility]: [facility];  Service: Cardiac Surgery    OR ESOPHAGOGASTRODUODENOSCOPY TRANSORAL DIAGNOSTIC N/A 8/31/2018    Procedure: ESOPHAGOGASTRODUODENOSCOPY (EGD); Surgeon: Elly Perea MD;  Location: [facility]; Service: Gastroenterology    IN REPLACE AORTIC VALVE OPENFEMORAL ARTERY APPROACH N/A 10/9/2018    Procedure: REPLACEMENT AORTIC VALVE TRANSCATHETER (TAVR) TRANSFEMORAL W/ 23 MM MENDOZA NOE S3 VALVE (ACCESS OF LEFT);   Surgeon: Kathia Fuentes DO;  [facility];  Service: Cardiac Surgery    TOTAL ABDOMINAL HYSTERECTOMY      TOTAL HIP ARTHROPLASTY Left 2007    TOTAL KNEE ARTHROPLASTY Bilateral         Family History   Problem Relation Age of Onset    Diabetes Mother     Stroke Mother     Cancer Father     Lung cancer Father     Diabetes Sister     Heart disease Sister     Coronary artery disease Family     Diabetes Family     Hypertension Family     Cancer Family     Stroke Family         Social History     Socioeconomic History    Marital status: Single     Spouse name: Not on file    Number of children: Not on file    Years of education: Not on file    Highest education level: Not on file   Occupational History    Not on file   Social Needs    Financial resource strain: Not on file    Food insecurity:     Worry: Not on file     Inability: Not on file    Transportation needs:     Medical: Not on file     Non-medical: Not on file   Tobacco Use    Smoking status: Never Smoker    Smokeless tobacco: Never Used   Substance and Sexual Activity    Alcohol use: No    Drug use: No    Sexual activity: Never   Lifestyle    Physical activity:     Days per week: Not on file     Minutes per session: Not on file    Stress: Not on file   Relationships    Social connections:     Talks on phone: Not on file     Gets together: Not on file     Attends Jew service: Not on file     Active member of club or organization: Not on file     Attends meetings of clubs or organizations: Not on file     Relationship status: Not on file    Intimate partner violence:     Fear of current or ex partner: Not on file     Emotionally abused: Not on file     Physically abused: Not on file     Forced sexual activity: Not on file   Other Topics Concern    Not on file   Social History Narrative    Denied drinking coffee    Denied exercise habits        Physical Exam:     /50 (BP Location: Right arm, Patient Position: Sitting, Cuff Size: Adult)   Pulse 72   Temp 98 2 °F (36 8 °C)   Resp 18   Ht 4' 7" (1 397 m)   Wt 99 3 kg (219 lb)   SpO2 91%   BMI 50 90 kg/m²     Physical Exam   Constitutional: She is oriented to person, place, and time  She appears well-developed and well-nourished  No distress  HENT:   Head: Normocephalic and atraumatic  Right Ear: External ear normal    Left Ear: External ear normal    Mouth/Throat: Oropharynx is clear and moist    Left ear canal appears to be healing well  No discharge  No tm visible  Eyes: Pupils are equal, round, and reactive to light  Conjunctivae and EOM are normal  Right eye exhibits no discharge  Left eye exhibits no discharge  Neck: Normal range of motion  Neck supple  No thyromegaly present  Cardiovascular: Normal rate and regular rhythm  Exam reveals no friction rub  Murmur (Grade 1/6 systolic murmur  ) heard  Pulmonary/Chest: Effort normal and breath sounds normal  No respiratory distress  She has no wheezes  She has no rales  She exhibits no tenderness  Abdominal: Soft  Bowel sounds are normal  There is no tenderness  Abdomen is pendulous    Musculoskeletal: Normal range of motion  She exhibits no edema, tenderness or deformity  Ambulates slowly with a cane  Lymphadenopathy:     She has no cervical adenopathy  Neurological: She is alert and oriented to person, place, and time  No cranial nerve deficit  Skin: Skin is warm and dry  No rash noted  She is not diaphoretic  Psychiatric: She has a normal mood and affect   Her behavior is normal  Judgment and thought content normal         Data:     Pre-operative work-up  CBC:   Results from last 6 Months   Lab Units 19  1401   WBC Thousand/uL 10 43*   RBC Million/uL 4 76   HEMOGLOBIN g/dL 13 1   HEMATOCRIT % 43 2   MCV fL 91   MCH pg 27 5   MCHC g/dL 30 3*   RDW % 14 8   MPV fL 10 3   PLATELETS Thousands/uL 329     Chemistry Profile:   Results from last 6 Months   Lab Units 19  1401 19  0728   POTASSIUM mmol/L 4 4  --    CHLORIDE mmol/L 107  --    CO2 mmol/L 29  --    BUN mg/dL 17 20   CREATININE mg/dL 0 74 0 93   GLUCOSE FASTING mg/dL  --  77   CALCIUM mg/dL 9 9  --    EGFR ml/min/1 73sq m 77 59     Coagulation Studies:     Endocrine Studies:       Invalid input(s): [id number]    EKG: EKG: n/a  Chest x-ray: n/a    Previous cardiopulmonary studies within the past year:  · Echocardiogram: 19  · Cardiac Catheterization: 2018  · Stress Test: 17  · Pulmonary Function Testin      Assessment & Recommendations:     1  Preop examination      Pt is medically clear for the proposed low risk surgery  2  Chronic diastolic congestive heart failure ([address] )     3  S/P TAVR (transcatheter aortic valve replacement)     4  Obesity, morbid ([address] )     5  Essential hypertension         Pre-Op Evaluation Assessment  77 y o  female with planned surgery: Phaco with IOL right eye > left eye  Known risk factors for perioperative complications: Coronary disease  Congestive heart failure  Cardiac Risk Estimation: per the Revised Cardiac Risk Index (Circ  100:1043, 1999), the patient's risk factors for cardiac complications include None, putting her in: RCI RISK CLASS III (2 risk factors, risk of major cardiac compl  appr  3 6%)  Current medications which may produce withdrawal symptoms if withheld perioperatively: None  Pre-Op Evaluation Plan  1  Further preoperative workup as follows:   - None; no further preoperative work-up is required    2  Change in medication regimen before surgery:   - None, continue medication regimen including morning of surgery, with sip of water    3   Prophylaxis for cardiac events with perioperative beta-blockers: not indicated  4  Patient requires further consultation with: None    Clearance  Patient is CLEARED for surgery without any additional cardiac testing  [facility], [address] [address]  Phone#  [phone]  Fax#  [phone]    Portions of the record may have been created with voice recognition software   Occasional wrong word or "sound a like" substitutions may have occurred due to the inherent limitations of voice recognition software   Read the chart carefully and recognize, using context, where substitutions have occurred

## 2019-11-21 NOTE — PATIENT INSTRUCTIONS
Discussed all with patient  She is medically clear for the proposed low risk surgery  Take your medications as planned  Patient was advised that she should not stop the aspirin prior to this procedure  Please let your surgeon know that and if there is any question we can discuss with Dr Elder Cheadle  Continue all current meds  Pt refuses flu shot

## 2019-11-25 ENCOUNTER — TELEPHONE (OUTPATIENT)
Dept: CARDIOLOGY CLINIC | Facility: CLINIC | Age: 79
End: 2019-11-25

## 2019-11-26 NOTE — TELEPHONE ENCOUNTER
S/w pt and she verbally understood per Dr Darby Score she does not need to stop aspirin prior to cataract sx

## 2019-12-19 DIAGNOSIS — K21.00 GASTROESOPHAGEAL REFLUX DISEASE WITH ESOPHAGITIS: ICD-10-CM

## 2019-12-19 RX ORDER — OMEPRAZOLE 40 MG/1
CAPSULE, DELAYED RELEASE ORAL
Qty: 180 CAPSULE | Refills: 1 | Status: SHIPPED | OUTPATIENT
Start: 2019-12-19 | End: 2020-07-13 | Stop reason: SDUPTHER

## 2019-12-26 ENCOUNTER — TELEPHONE (OUTPATIENT)
Dept: FAMILY MEDICINE CLINIC | Facility: CLINIC | Age: 79
End: 2019-12-26

## 2019-12-26 ENCOUNTER — OFFICE VISIT (OUTPATIENT)
Dept: FAMILY MEDICINE CLINIC | Facility: CLINIC | Age: 79
End: 2019-12-26
Payer: MEDICARE

## 2019-12-26 VITALS
WEIGHT: 220 LBS | HEART RATE: 69 BPM | HEIGHT: 55 IN | DIASTOLIC BLOOD PRESSURE: 72 MMHG | BODY MASS INDEX: 50.91 KG/M2 | SYSTOLIC BLOOD PRESSURE: 114 MMHG | OXYGEN SATURATION: 98 %

## 2019-12-26 DIAGNOSIS — I50.32 CHRONIC DIASTOLIC CONGESTIVE HEART FAILURE (HCC): Chronic | ICD-10-CM

## 2019-12-26 DIAGNOSIS — Z95.2 S/P TAVR (TRANSCATHETER AORTIC VALVE REPLACEMENT): ICD-10-CM

## 2019-12-26 DIAGNOSIS — Z99.89 OSA ON CPAP: Chronic | ICD-10-CM

## 2019-12-26 DIAGNOSIS — G47.33 OSA ON CPAP: Chronic | ICD-10-CM

## 2019-12-26 DIAGNOSIS — Z01.818 PREOPERATIVE CLEARANCE: Primary | ICD-10-CM

## 2019-12-26 DIAGNOSIS — J44.9 COPD, MILD (HCC): Chronic | ICD-10-CM

## 2019-12-26 PROCEDURE — 99214 OFFICE O/P EST MOD 30 MIN: CPT | Performed by: FAMILY MEDICINE

## 2019-12-26 RX ORDER — OFLOXACIN 3 MG/ML
SOLUTION/ DROPS OPHTHALMIC
Refills: 3 | COMMUNITY
Start: 2019-12-14 | End: 2020-07-08 | Stop reason: ALTCHOICE

## 2019-12-26 RX ORDER — TRIPROLIDINE/PSEUDOEPHEDRINE 2.5MG-60MG
TABLET ORAL
Refills: 3 | COMMUNITY
Start: 2019-12-14 | End: 2020-07-08 | Stop reason: ALTCHOICE

## 2019-12-26 NOTE — PROGRESS NOTES
PRE-OPERATIVE EVALUATION  [address]    NAME: Joel De La O  AGE: [de-identified] y o  SEX: female  : 1940     DATE: 2019    Chief Complaint: Pre-operative Evaluation     Surgery: Phaco with IOL os  Anticipated Date of Surgery:   Referring Provider: Dr Madi Bentley     History of Present Illness:     Joel De La O is a [de-identified] y o  female who presents to the office today for a preoperative consultation at the request of surgeon Dr Madi Bentley who plans on performing Phaco with IOL os on 2019  Planned anesthesia is MAC  Patient has a bleeding risk of: Pt is currently on aspirin  Told by cardiology not to d/c the aspirin    Patient does not have objections to receiving blood products if needed  Current anti-platelet/anti-coagulation medications that the patient is prescribed includes: Aspirin     Assessment of Chronic Conditions:   CAD  Chf  Asthma  Hypertension     Assessment of Cardiac Risk:  · Denies unstable or severe angina or MI in the last 6 weeks or history of stent placement in the last year   · Denies decompensated heart failure (e g  New onset heart failure, NYHA functional class IV heart failure, or worsening existing heart failure)  · Denies significant arrhythmias such as high grade AV block, symptomatic ventricular arrhythmia, newly recognized ventricular tachycardia, supraventricular tachycardia with resting heart rate >100, or symptomatic bradycardia  · Denies severe heart valve disease including aortic stenosis or symptomatic mitral stenosis  Pt with h/o TAVR     Exercise Capacity:  · Able to walk 4 blocks without symptoms?: No, will develop sob  · Able to walk 2 flights without symptoms?: No, will develop sob    Prior Anesthesia Reactions: Yes, hyperhidrosis     Personal history of venous thromboembolic disease? No    History of steroid use for >2 weeks within last year?  No    STOP-BANG Sleep Apnea Screening Questionnaire:  Pt is currently on cpap and used 2 liters n/c prn at home  Review of Systems:     Review of Systems   Constitutional: Negative for chills, diaphoresis, fatigue and fever  HENT: Negative  Eyes: Positive for visual disturbance  Vision s/p cataract removal right eye is good per pt  Respiratory: Positive for shortness of breath (with exertion)  Negative for cough and wheezing  Pt is on cpap at home and used oxygen 2liters at night and sometimes when she is out  Cardiovascular: Negative for chest pain and palpitations  Genitourinary: Negative  But at times she has urinary frequency  Pt is on lasix  Musculoskeletal: Positive for gait problem (ambulates with cane  )  Negative for arthralgias  Skin: Negative for rash and wound  Neurological: Negative for dizziness, light-headedness and headaches  Psychiatric/Behavioral: Negative for dysphoric mood  The patient is nervous/anxious  Still missing her sister who recently passed away and her dog just   Current Problem List:     Patient Active Problem List   Diagnosis    Hypothyroid    Hypertension    Hyperlipidemia    Reactive airway disease without complication    JANICE (obstructive sleep apnea)    LVH (left ventricular hypertrophy)    Mitral annular calcification    Mitral valve stenosis    Pulmonary hypertension (HCC)    Shortness of breath    Chronic diastolic (congestive) heart failure (HCC)    Anemia    Obesity, morbid (HCC)    Gastroesophageal reflux disease without esophagitis    Arthritis    AVB (atrioventricular block)    Chronic diastolic congestive heart failure (HCC)    COPD, mild (HCC)    Coronary artery disease    JANICE on CPAP    S/P TAVR (transcatheter aortic valve replacement)    Acute pulmonary insufficiency    Postoperative anemia due to acute blood loss    Encounter for postoperative care    Depression with anxiety    Chronic otitis media       Allergies:      Allergies   Allergen Reactions    Latex Rash    Neosporin [Neomycin-Bacitracin Zn-Polymyx] Rash and Other (See Comments)     hives per [facility] order       Physical Exam:       Current Outpatient Medications:     albuterol (2 5 mg/3 mL) 0 083 % nebulizer solution, Take 1 vial (2 5 mg total) by nebulization every 6 (six) hours as needed for wheezing or shortness of breath (Patient not taking: Reported on 11/21/2019), Disp: 360 mL, Rfl: 1    albuterol (PROVENTIL HFA,VENTOLIN HFA) 90 mcg/act inhaler, Inhale 2 puffs every 6 (six) hours as needed for wheezing (Patient not taking: Reported on 11/21/2019), Disp: 1 Inhaler, Rfl: 3    aspirin (ECOTRIN LOW STRENGTH) 81 mg EC tablet, Take 1 tablet (81 mg total) by mouth daily, Disp: 100 tablet, Rfl: 0    b complex vitamins capsule, Take 1 capsule by mouth 2 (two) times a day  , Disp: , Rfl:     BREO ELLIPTA 100-25 MCG/INH inhaler, INHALE 1 PUFF DAILY RINSE MOUTH AFTER USE , Disp: 1 Inhaler, Rfl: 3    Calcium Carb-Cholecalciferol (CALCIUM 600 + D PO), Take 1 tablet by mouth 2 (two) times a day, Disp: , Rfl:     DUREZOL 0 05 % EMUL, INSTILL 1 DROP INTO RIGHT EYE FOUR TIMES A DAY AS DIRECTED FOR USE AFTER SURGERY, Disp: , Rfl: 3    Fesoterodine Fumarate ER (TOVIAZ) 8 MG TB24, Take 8 mg by mouth daily, Disp: , Rfl:     fluticasone (FLONASE) 50 mcg/act nasal spray, SPRAY 2 SPRAYS INTO EACH NOSTRIL EVERY DAY, Disp: , Rfl: 0    furosemide (LASIX) 20 mg tablet, Take 1 tablet (20 mg total) by mouth 2 (two) times a day, Disp: 30 tablet, Rfl: 3    IRON PO, Take by mouth daily, Disp: , Rfl:     levothyroxine 50 mcg tablet, TAKE 1 TABLET (50 MCG TOTAL) BY MOUTH DAILY, Disp: 90 tablet, Rfl: 1    loratadine (CLARITIN) 10 mg tablet, Take 1 tablet (10 mg total) by mouth daily, Disp: 90 tablet, Rfl: 1    losartan (COZAAR) 25 mg tablet, Take 0 5 tablets (12 5 mg total) by mouth daily, Disp: 45 tablet, Rfl: 1    metoprolol succinate (TOPROL-XL) 25 mg 24 hr tablet, TAKE 1 TABLET BY MOUTH EVERY DAY, Disp: 90 tablet, Rfl: 0    mupirocin (BACTROBAN) 2 % cream, Apply topically 3 (three) times a day Behind the left tm , Disp: 15 g, Rfl: 0    nystatin (MYCOSTATIN) 500,000 units/5 mL suspension, Apply 5 mL (500,000 Units total) to the mouth or throat 4 (four) times a day, Disp: 474 mL, Rfl: 0    ofloxacin (OCUFLOX) 0 3 % ophthalmic solution, INSTILL 1 DROP INTO RIGHT EYE 4 X A DAY AS DIRECTED START3 DAYS PRIOR TO SURGERY IN OPERATIVE EYE, Disp: , Rfl: 3    omeprazole (PriLOSEC) 40 MG capsule, TAKE 1 CAPSULE BY MOUTH TWICE A DAY, Disp: 180 capsule, Rfl: 1    sertraline (ZOLOFT) 50 mg tablet, TAKE 1 5 TABLETS BY MOUTH DAILY, Disp: 135 tablet, Rfl: 1    simvastatin (ZOCOR) 40 mg tablet, TAKE 1 TABLET (40 MG TOTAL) BY MOUTH DAILY AT BEDTIME, Disp: 90 tablet, Rfl: 1    temazepam (RESTORIL) 7 5 mg capsule, Take 1 capsule (7 5 mg total) by mouth daily at bedtime as needed for sleep, Disp: 90 capsule, Rfl: 0    Past Medical History:       Past Medical History:   Diagnosis Date    Anemia 08/22/2018    Arthritis     AVB (atrioventricular block)     first degree    CHF (congestive heart failure) (HCC)     COPD, mild (HCC)     Coronary artery disease     Dislocation of right shoulder joint     Frequent UTI     GERD (gastroesophageal reflux disease)     H/O: pneumonia     Heme positive stool     History of uterine cancer     s/p CALLIE    Hyperlipidemia     Hypertension     Hypothyroidism     Obesity, morbid ([facility] [address] ) 08/22/2018    JANICE on CPAP     Pulmonary hypertension (HCC) 08/22/2018    Severe aortic stenosis     Simple goiter     Skin cyst     within the armpits, right        Past Surgical History:   Procedure Laterality Date    BREAST BIOPSY      CARDIAC CATHETERIZATION      CARPAL TUNNEL RELEASE Bilateral     CHOLECYSTECTOMY      DILATION AND CURETTAGE OF UTERUS      HYSTEROSCOPY      MASTOID SURGERY      NV COLONOSCOPY FLX DX W/COLLJ SPEC WHEN PFRMD N/A 9/6/2018    Procedure: COLONOSCOPY;  Surgeon: Rin De Souza MD;  Location: [facility]; Service: Gastroenterology    OR ECHO TRANSESOPHAG R-T 2D W/PRB IMG ACQUISJ I&R N/A 10/9/2018    Procedure: INTRA-OP TRANSESOPHAGEAL ECHOCARDIOGRAM (GARRISNO); Surgeon: Isadora Ness DO;  Location:  MAIN OR;  Service: Cardiac Surgery    OR ESOPHAGOGASTRODUODENOSCOPY TRANSORAL DIAGNOSTIC N/A 8/31/2018    Procedure: ESOPHAGOGASTRODUODENOSCOPY (EGD); Surgeon: Rin De Souza MD;  Location: [facility]; Service: Gastroenterology    OR REPLACE AORTIC VALVE OPENFEMORAL ARTERY APPROACH N/A 10/9/2018    Procedure: REPLACEMENT AORTIC VALVE TRANSCATHETER (TAVR) TRANSFEMORAL W/ 23 MM MENDOZA NOE S3 VALVE (ACCESS OF LEFT);   Surgeon: Isadora Ness DO;  Location:  MAIN OR;  Service: Cardiac Surgery    TOTAL ABDOMINAL HYSTERECTOMY      TOTAL HIP ARTHROPLASTY Left 2007    TOTAL KNEE ARTHROPLASTY Bilateral         Family History   Problem Relation Age of Onset    Diabetes Mother     Stroke Mother     Cancer Father     Lung cancer Father     Diabetes Sister     Heart disease Sister     Coronary artery disease Family     Diabetes Family     Hypertension Family     Cancer Family     Stroke Family         Social History     Socioeconomic History    Marital status: Single     Spouse name: Not on file    Number of children: Not on file    Years of education: Not on file    Highest education level: Not on file   Occupational History    Not on file   Social Needs    Financial resource strain: Not on file    Food insecurity:     Worry: Not on file     Inability: Not on file    Transportation needs:     Medical: Not on file     Non-medical: Not on file   Tobacco Use    Smoking status: Never Smoker    Smokeless tobacco: Never Used   Substance and Sexual Activity    Alcohol use: No    Drug use: No    Sexual activity: Never   Lifestyle    Physical activity:     Days per week: Not on file     Minutes per session: Not on file    Stress: Not on file Relationships    Social connections:     Talks on phone: Not on file     Gets together: Not on file     Attends Hinduism service: Not on file     Active member of club or organization: Not on file     Attends meetings of clubs or organizations: Not on file     Relationship status: Not on file    Intimate partner violence:     Fear of current or ex partner: Not on file     Emotionally abused: Not on file     Physically abused: Not on file     Forced sexual activity: Not on file   Other Topics Concern    Not on file   Social History Narrative    Denied drinking coffee    Denied exercise habits        Physical Exam:     /72   Pulse 69   Ht 4' 7" (1 397 m)   Wt 99 8 kg (220 lb)   SpO2 98%   BMI 51 13 kg/m²     Physical Exam   Constitutional: She is oriented to person, place, and time  She appears well-developed and well-nourished  No distress  HENT:   Head: Normocephalic and atraumatic  Right Ear: External ear normal    Mouth/Throat: Oropharynx is clear and moist    Left ear canal with granulation tissue  No tm visualized  Eyes: Pupils are equal, round, and reactive to light  Conjunctivae and EOM are normal  Right eye exhibits no discharge  Left eye exhibits no discharge  + cataract left eye   Neck: Normal range of motion  Neck supple  Cardiovascular: Normal rate, regular rhythm and normal heart sounds  Pulmonary/Chest: Effort normal and breath sounds normal  No respiratory distress  She has no wheezes  She has no rales  Abdominal: Soft  Bowel sounds are normal  There is no tenderness  Abdomen is pendulous   Musculoskeletal: Normal range of motion  She exhibits no edema, tenderness or deformity  Lymphadenopathy:     She has no cervical adenopathy  Neurological: She is alert and oriented to person, place, and time  No cranial nerve deficit  Skin: Skin is warm and dry  No rash noted  She is not diaphoretic  Psychiatric: She has a normal mood and affect   Her behavior is normal  Judgment and thought content normal         Data:     Pre-operative work-up  CBC:   Results from last 6 Months   Lab Units 19  1401   WBC Thousand/uL 10 43*   RBC Million/uL 4 76   HEMOGLOBIN g/dL 13 1   HEMATOCRIT % 43 2   MCV fL 91   MCH pg 27 5   MCHC g/dL 30 3*   RDW % 14 8   MPV fL 10 3   PLATELETS Thousands/uL 329     Chemistry Profile:   Results from last 6 Months   Lab Units 19  1401   POTASSIUM mmol/L 4 4   CHLORIDE mmol/L 107   CO2 mmol/L 29   BUN mg/dL 17   CREATININE mg/dL 0 74   CALCIUM mg/dL 9 9   EGFR ml/min/1 73sq m 77       EKG: EKG: not indicated  Chest x-ray: n/a    Previous cardiopulmonary studies within the past year:  · Echocardiogram: 19  · Cardiac Catheterization: 18  · Stress Test: 17  · Pulmonary Function Testin18      Assessment & Recommendations:     1  Preoperative clearance      Patient is medically clear for this low risk surgery  Advised by Cardiology not to stop her aspirin  2  S/P TAVR (transcatheter aortic valve replacement)     3  JANICE on CPAP     4  Chronic diastolic congestive heart failure ([address] )     5  COPD, mild ([address] )         Pre-Op Evaluation Assessment  [de-identified] y o  female with planned surgery: Phaco with IOL os   Known risk factors for perioperative complications: Coronary disease  Congestive heart failure  Chronic pulmonary disease  Cardiac Risk Estimation: per the Revised Cardiac Risk Index (Circ  100:1043, 1999), the patient's risk factors for cardiac complications include bleeding r/t unable to stop the aspirin as per cardiology, putting her in: [address] [place] (2 risk factors, risk of major cardiac compl  appr  3 6%)  Current medications which may produce withdrawal symptoms if withheld perioperatively: None  Pre-Op Evaluation Plan  1  Further preoperative workup as follows:   - None; no further preoperative work-up is required    2   Change in medication regimen before surgery:   - None, continue medication regimen including morning of surgery, with sip of water    3  Prophylaxis for cardiac events with perioperative beta-blockers: not indicated  4  Patient requires further consultation with: None    Clearance  Patient is CLEARED for surgery without any additional cardiac testing  Telma Valente, [address] [id number]  Phone#  [phone]  Fax#  [phone]    Portions of the record may have been created with voice recognition software   Occasional wrong word or "sound a like" substitutions may have occurred due to the inherent limitations of voice recognition software   Read the chart carefully and recognize, using context, where substitutions have occurred

## 2019-12-26 NOTE — PATIENT INSTRUCTIONS
Discussed all with patient  She is medically clear for the low risk proposed surgery  Patient was advised by Cardiology not to discontinue her aspirin  Continue all your current medications  Follow-up here at least every 3 months and as needed

## 2019-12-26 NOTE — ASSESSMENT & PLAN NOTE
Wt Readings from Last 3 Encounters:   12/26/19 99 8 kg (220 lb)   11/21/19 99 3 kg (219 lb)   10/03/19 98 kg (216 lb)     Stable on current meds

## 2019-12-31 DIAGNOSIS — I50.32 CHRONIC DIASTOLIC (CONGESTIVE) HEART FAILURE (HCC): ICD-10-CM

## 2020-01-03 RX ORDER — METOPROLOL SUCCINATE 25 MG/1
TABLET, EXTENDED RELEASE ORAL
Qty: 90 TABLET | Refills: 0 | Status: SHIPPED | OUTPATIENT
Start: 2020-01-03 | End: 2020-03-30 | Stop reason: SDUPTHER

## 2020-01-14 ENCOUNTER — TELEPHONE (OUTPATIENT)
Dept: CARDIOLOGY CLINIC | Facility: CLINIC | Age: 80
End: 2020-01-14

## 2020-01-14 DIAGNOSIS — I50.32 CHRONIC DIASTOLIC CONGESTIVE HEART FAILURE (HCC): Chronic | ICD-10-CM

## 2020-01-14 RX ORDER — FUROSEMIDE 20 MG/1
20 TABLET ORAL 2 TIMES DAILY
Qty: 30 TABLET | Refills: 3
Start: 2020-01-14 | End: 2020-01-21

## 2020-01-14 NOTE — TELEPHONE ENCOUNTER
Pt called requesting a refill of her medication send to the [facility] pharmacy

## 2020-01-16 ENCOUNTER — OFFICE VISIT (OUTPATIENT)
Dept: FAMILY MEDICINE CLINIC | Facility: CLINIC | Age: 80
End: 2020-01-16
Payer: MEDICARE

## 2020-01-16 VITALS
HEIGHT: 55 IN | BODY MASS INDEX: 51.38 KG/M2 | DIASTOLIC BLOOD PRESSURE: 70 MMHG | RESPIRATION RATE: 18 BRPM | TEMPERATURE: 100.2 F | SYSTOLIC BLOOD PRESSURE: 110 MMHG | WEIGHT: 222 LBS | HEART RATE: 58 BPM | OXYGEN SATURATION: 93 %

## 2020-01-16 DIAGNOSIS — E66.01 OBESITY, MORBID (HCC): ICD-10-CM

## 2020-01-16 DIAGNOSIS — H92.02 OTALGIA OF LEFT EAR: Primary | ICD-10-CM

## 2020-01-16 DIAGNOSIS — Z95.2 S/P TAVR (TRANSCATHETER AORTIC VALVE REPLACEMENT): ICD-10-CM

## 2020-01-16 PROCEDURE — 99214 OFFICE O/P EST MOD 30 MIN: CPT | Performed by: FAMILY MEDICINE

## 2020-01-16 RX ORDER — AMOXICILLIN AND CLAVULANATE POTASSIUM 875; 125 MG/1; MG/1
1 TABLET, FILM COATED ORAL EVERY 12 HOURS SCHEDULED
Qty: 20 TABLET | Refills: 0 | Status: SHIPPED | OUTPATIENT
Start: 2020-01-16 | End: 2020-01-26

## 2020-01-16 NOTE — PATIENT INSTRUCTIONS
Discussed all with patient  My concern is the recent left total mastoidectomy with the tumor removal on the left  Will treat now with an antibiotic as you are having pain and low-grade fever but I want you to make an appointment with ENT now  Move up the appointment  Follow-up here in 1 week but I want you seen by ENT prior to that  Continue all your other medications  Keep your follow-up with Dr Veronica Jordan

## 2020-01-16 NOTE — PROGRESS NOTES
Assessment/Plan:     Chronic Problems:  S/P TAVR (transcatheter aortic valve replacement)  Keep the f/u with Dr Ana Lilia Morocho  Visit Diagnosis:  Diagnoses and all orders for this visit:    Otalgia of left ear  Comments:  Pt is s/p Left Total Mastoidectomy with tumor removal and CO2 laser GRAFT EAR CARTILAGE TO EAR OR NOSE  Will treat with abx and refer to Dr Ev Fischer    Orders:  -     amoxicillin-clavulanate (AUGMENTIN) 875-125 mg per tablet; Take 1 tablet by mouth every 12 (twelve) hours for 10 days  -     Ambulatory Referral to Otolaryngology; Future    Obesity, morbid ([facility] [address] )    S/P TAVR (transcatheter aortic valve replacement)          Subjective:    Patient ID: Joshua Leung is a [de-identified] y o  female  Pt is here with c/o pain in the left ear since Tuesday and her head does not feel right  This is how it felt prior to her surgery  Also hears ringing in the left ear, but worse recently  No fevers at home, but running temp now  Has been taking tylenol for the pains  Also took a migraine pill this am, but did not help  No migraine now, but she feels pressure in the head  Pt had Left Total Mastoidectomy with tumor removal and CO2 laser  GRAFT EAR CARTILAGE TO EAR OR NOSE on July 23, 2019  Takes all other meds as directed  No side effects noted  The following portions of the patient's history were reviewed and updated as appropriate: allergies, current medications, past family history, past medical history, past social history, past surgical history and problem list     Review of Systems   Constitutional: Negative for chills, diaphoresis, fatigue and fever  HENT: Positive for ear pain  Negative for ear discharge, rhinorrhea, sinus pressure, sinus pain and sore throat  Respiratory: Negative for cough, shortness of breath and wheezing  Cardiovascular: Negative for chest pain and palpitations  Gastrointestinal: Negative  Genitourinary: Positive for frequency (from the water pill   )   Negative for difficulty urinating and urgency  Neurological: Negative for dizziness, light-headedness and headaches (but pressure in the head )           /70   Pulse 58   Temp 100 2 °F (37 9 °C) (Tympanic)   Resp 18   Ht 4' 7" (1 397 m)   Wt 101 kg (222 lb)   SpO2 93%   BMI 51 60 kg/m²   Social History     Socioeconomic History    Marital status: Single     Spouse name: Not on file    Number of children: Not on file    Years of education: Not on file    Highest education level: Not on file   Occupational History    Not on file   Social Needs    Financial resource strain: Not on file    Food insecurity:     Worry: Not on file     Inability: Not on file    Transportation needs:     Medical: Not on file     Non-medical: Not on file   Tobacco Use    Smoking status: Never Smoker    Smokeless tobacco: Never Used   Substance and Sexual Activity    Alcohol use: No    Drug use: No    Sexual activity: Never   Lifestyle    Physical activity:     Days per week: Not on file     Minutes per session: Not on file    Stress: Not on file   Relationships    Social connections:     Talks on phone: Not on file     Gets together: Not on file     Attends [employer] service: Not on file     Active member of club or organization: Not on file     Attends meetings of clubs or organizations: Not on file     Relationship status: Not on file    Intimate partner violence:     Fear of current or ex partner: Not on file     Emotionally abused: Not on file     Physically abused: Not on file     Forced sexual activity: Not on file   Other Topics Concern    Not on file   Social History Narrative    Denied drinking coffee    Denied exercise habits     Past Medical History:   Diagnosis Date    Anemia 08/22/2018    Arthritis     AVB (atrioventricular block)     first degree    CHF (congestive heart failure) ([facility] [address] )     COPD, mild ([facility] [address] )     Coronary artery disease     Dislocation of right shoulder joint     Frequent UTI     GERD (gastroesophageal reflux disease)     H/O: pneumonia     Heme positive stool     History of uterine cancer     s/p CALLIE    Hyperlipidemia     Hypertension     Hypothyroidism     Obesity, morbid ([facility] [address] ) 08/22/2018    JANICE on CPAP     Pulmonary hypertension ([facility] [address] ) 08/22/2018    Severe aortic stenosis     Simple goiter     Skin cyst     within the armpits, right     Family History   Problem Relation Age of Onset    Diabetes Mother     Stroke Mother     Cancer Father     Lung cancer Father     Diabetes Sister     Heart disease Sister     Coronary artery disease Family     Diabetes Family     Hypertension Family     Cancer Family     Stroke Family      Past Surgical History:   Procedure Laterality Date    BREAST BIOPSY      CARDIAC CATHETERIZATION      CARPAL TUNNEL RELEASE Bilateral     CHOLECYSTECTOMY      DILATION AND CURETTAGE OF UTERUS      HYSTEROSCOPY      MASTOID SURGERY      SD COLONOSCOPY FLX DX W/COLLJ SPEC WHEN PFRMD N/A 9/6/2018    Procedure: COLONOSCOPY;  Surgeon: Lizy Shaw MD;  [facility]; Service: Gastroenterology    SD ECHO TRANSESOPHAG R-T 2D W/PRB IMG ACQUISJ I&R N/A 10/9/2018    Procedure: INTRA-OP TRANSESOPHAGEAL ECHOCARDIOGRAM (GARRISON); Surgeon: Yimi Medina DO;  [facility];  Service: Cardiac Surgery    SD ESOPHAGOGASTRODUODENOSCOPY TRANSORAL DIAGNOSTIC N/A 8/31/2018    Procedure: ESOPHAGOGASTRODUODENOSCOPY (EGD); Surgeon: Lizy Shaw MD;  [facility]; Service: Gastroenterology    SD REPLACE AORTIC VALVE OPENFEMORAL ARTERY APPROACH N/A 10/9/2018    Procedure: REPLACEMENT AORTIC VALVE TRANSCATHETER (TAVR) TRANSFEMORAL W/ 23 MM MENDOZA NOE S3 VALVE (ACCESS OF LEFT);   Surgeon: Yimi Medina DO;  [facility];  Service: Cardiac Surgery    TOTAL ABDOMINAL HYSTERECTOMY      TOTAL HIP ARTHROPLASTY Left 2007    TOTAL KNEE ARTHROPLASTY Bilateral        Current Outpatient Medications:     albuterol (2 5 mg/3 mL) 0 083 % nebulizer solution, Take 1 vial (2 5 mg total) by nebulization every 6 (six) hours as needed for wheezing or shortness of breath, Disp: 360 mL, Rfl: 1    albuterol (PROVENTIL HFA,VENTOLIN HFA) 90 mcg/act inhaler, Inhale 2 puffs every 6 (six) hours as needed for wheezing, Disp: 1 Inhaler, Rfl: 3    aspirin (ECOTRIN LOW STRENGTH) 81 mg EC tablet, Take 1 tablet (81 mg total) by mouth daily, Disp: 100 tablet, Rfl: 0    b complex vitamins capsule, Take 1 capsule by mouth 2 (two) times a day  , Disp: , Rfl:     BREO ELLIPTA 100-25 MCG/INH inhaler, INHALE 1 PUFF DAILY RINSE MOUTH AFTER USE , Disp: 1 Inhaler, Rfl: 3    Calcium Carb-Cholecalciferol (CALCIUM 600 + D PO), Take 1 tablet by mouth 2 (two) times a day, Disp: , Rfl:     DUREZOL 0 05 % EMUL, INSTILL 1 DROP INTO RIGHT EYE FOUR TIMES A DAY AS DIRECTED FOR USE AFTER SURGERY, Disp: , Rfl: 3    Fesoterodine Fumarate ER (TOVIAZ) 8 MG TB24, Take 8 mg by mouth daily, Disp: , Rfl:     fluticasone (FLONASE) 50 mcg/act nasal spray, SPRAY 2 SPRAYS INTO EACH NOSTRIL EVERY DAY, Disp: , Rfl: 0    furosemide (LASIX) 20 mg tablet, Take 1 tablet (20 mg total) by mouth 2 (two) times a day, Disp: 30 tablet, Rfl: 3    IRON PO, Take by mouth daily, Disp: , Rfl:     levothyroxine 50 mcg tablet, TAKE 1 TABLET (50 MCG TOTAL) BY MOUTH DAILY, Disp: 90 tablet, Rfl: 1    loratadine (CLARITIN) 10 mg tablet, Take 1 tablet (10 mg total) by mouth daily, Disp: 90 tablet, Rfl: 1    losartan (COZAAR) 25 mg tablet, Take 0 5 tablets (12 5 mg total) by mouth daily, Disp: 45 tablet, Rfl: 1    metoprolol succinate (TOPROL-XL) 25 mg 24 hr tablet, TAKE 1 TABLET BY MOUTH EVERY DAY, Disp: 90 tablet, Rfl: 0    mupirocin (BACTROBAN) 2 % cream, Apply topically 3 (three) times a day Behind the left tm , Disp: 15 g, Rfl: 0    nystatin (MYCOSTATIN) 500,000 units/5 mL suspension, Apply 5 mL (500,000 Units total) to the mouth or throat 4 (four) times a day, Disp: 474 mL, Rfl: 0   ofloxacin (OCUFLOX) 0 3 % ophthalmic solution, INSTILL 1 DROP INTO RIGHT EYE 4 X A DAY AS DIRECTED START3 DAYS PRIOR TO SURGERY IN OPERATIVE EYE, Disp: , Rfl: 3    omeprazole (PriLOSEC) 40 MG capsule, TAKE 1 CAPSULE BY MOUTH TWICE A DAY, Disp: 180 capsule, Rfl: 1    sertraline (ZOLOFT) 50 mg tablet, TAKE 1 5 TABLETS BY MOUTH DAILY, Disp: 135 tablet, Rfl: 1    simvastatin (ZOCOR) 40 mg tablet, TAKE 1 TABLET (40 MG TOTAL) BY MOUTH DAILY AT BEDTIME, Disp: 90 tablet, Rfl: 1    temazepam (RESTORIL) 7 5 mg capsule, Take 1 capsule (7 5 mg total) by mouth daily at bedtime as needed for sleep, Disp: 90 capsule, Rfl: 0    amoxicillin-clavulanate (AUGMENTIN) 875-125 mg per tablet, Take 1 tablet by mouth every 12 (twelve) hours for 10 days, Disp: 20 tablet, Rfl: 0    Allergies   Allergen Reactions    Latex Rash    Neosporin [Neomycin-Bacitracin Zn-Polymyx] Rash and Other (See Comments)     hives per [facility] order          Lab Review   No visits with results within 2 Month(s) from this visit  Latest known visit with results is:   Office Visit on 09/20/2019   Component Date Value    Throat Culture 09/20/2019 Negative for beta-hemolytic Streptococcus         Imaging: No results found  Objective:     Physical Exam   Constitutional: She is oriented to person, place, and time  She appears well-developed and well-nourished  No distress  HENT:   Head: Normocephalic and atraumatic  Right Ear: External ear normal    Mouth/Throat: Oropharynx is clear and moist    Left ear with no tm  Ear canal closed s/p surgery   Eyes: Pupils are equal, round, and reactive to light  Conjunctivae and EOM are normal  Right eye exhibits no discharge  Left eye exhibits no discharge  Neck: Normal range of motion  Neck supple  Cardiovascular: Normal rate and regular rhythm  Pulmonary/Chest: Effort normal and breath sounds normal  No respiratory distress  She has no wheezes  She has no rales  Abdominal: Soft   Bowel sounds are normal  There is no tenderness  Abdomen is pendulous  Musculoskeletal: She exhibits no edema, tenderness or deformity  Ambulates with walker  Lymphadenopathy:     She has no cervical adenopathy  Neurological: She is alert and oriented to person, place, and time  No cranial nerve deficit  Skin: Skin is warm and dry  No rash noted  She is not diaphoretic  Psychiatric: She has a normal mood and affect  Her behavior is normal  Judgment and thought content normal          Patient Instructions   Discussed all with patient  My concern is the recent left total mastoidectomy with the tumor removal on the left  Will treat now with an antibiotic as you are having pain and low-grade fever but I want you to make an appointment with ENT now  Move up the appointment  Follow-up here in 1 week but I want you seen by ENT prior to that  Continue all your other medications  Keep your follow-up with MABEL Mendoza    Portions of the record may have been created with voice recognition software  Occasional wrong word or "sound a like" substitutions may have occurred due to the inherent limitations of voice recognition software  Read the chart carefully and recognize, using context, where substitutions have occurred

## 2020-01-19 DIAGNOSIS — I50.32 CHRONIC DIASTOLIC CONGESTIVE HEART FAILURE (HCC): Chronic | ICD-10-CM

## 2020-01-21 RX ORDER — FUROSEMIDE 20 MG/1
TABLET ORAL
Qty: 90 TABLET | Refills: 0 | Status: SHIPPED | OUTPATIENT
Start: 2020-01-21 | End: 2020-04-22 | Stop reason: SDUPTHER

## 2020-01-23 ENCOUNTER — OFFICE VISIT (OUTPATIENT)
Dept: FAMILY MEDICINE CLINIC | Facility: CLINIC | Age: 80
End: 2020-01-23
Payer: MEDICARE

## 2020-01-23 VITALS
TEMPERATURE: 98.2 F | WEIGHT: 220 LBS | HEIGHT: 55 IN | OXYGEN SATURATION: 96 % | RESPIRATION RATE: 18 BRPM | HEART RATE: 60 BPM | BODY MASS INDEX: 50.91 KG/M2 | DIASTOLIC BLOOD PRESSURE: 64 MMHG | SYSTOLIC BLOOD PRESSURE: 110 MMHG

## 2020-01-23 DIAGNOSIS — G47.00 INSOMNIA, UNSPECIFIED TYPE: ICD-10-CM

## 2020-01-23 DIAGNOSIS — R42 LIGHTHEADEDNESS: Primary | ICD-10-CM

## 2020-01-23 DIAGNOSIS — I10 ESSENTIAL HYPERTENSION: Chronic | ICD-10-CM

## 2020-01-23 DIAGNOSIS — H95.192 COMPLICATION FOLLOWING LEFT MASTOIDECTOMY: ICD-10-CM

## 2020-01-23 PROCEDURE — 99214 OFFICE O/P EST MOD 30 MIN: CPT | Performed by: FAMILY MEDICINE

## 2020-01-23 RX ORDER — TEMAZEPAM 7.5 MG/1
7.5 CAPSULE ORAL
Qty: 90 CAPSULE | Refills: 0 | Status: SHIPPED | OUTPATIENT
Start: 2020-01-23 | End: 2020-04-22 | Stop reason: SDUPTHER

## 2020-01-23 NOTE — PATIENT INSTRUCTIONS
Discussed all with patient  The ear canal seems to be healing nicely but I am concerned about the lightheadedness as this is what you had prior to your surgery  Will attempt to get a CT scan attention to the ear and call with results  If this does not resolve we need to get you in to see Dr Gracia Mayer sooner than May of 2020  Ok to stop the antibiotics  BP's are wnl  Keep the f/u appt

## 2020-01-23 NOTE — PROGRESS NOTES
Assessment/Plan:     Chronic Problems:  Insomnia  Temazepam renewed  Hypertension  Home bp's are slightly high but acceptable but normal here today,       Visit Diagnosis:  Diagnoses and all orders for this visit:    Lightheadedness  Comments: Will get ct brain   Orders:  -     CT head wo contrast; Future    Insomnia, unspecified type  -     temazepam (RESTORIL) 7 5 mg capsule; Take 1 capsule (7 5 mg total) by mouth daily at bedtime as needed for sleep    Complication following left mastoidectomy  Comments:  keep the f/u appt with your specialists  Will get ct of the brain  Orders:  -     CT head wo contrast; Future    Essential hypertension          Subjective:    Patient ID: Florentin Holden is a [de-identified] y o  female  Pt is here for f/u on her tightness and pressure in the head  No ear pain  Pt was seen by ent and told that everything looks good  No definite headaches but has a dull pain in the entire head  Feels like she has pressure in her head like someone put a band around the head  Feels prior to her mastoid surgery and tumor removal she had the same pain but now it is coming back  Told by ent to d/c the abx  Told to use the nasal spray  Takes all other meds as directed  No side effects noted  Pt has a list of bp's from home with her  Numbers range from 128-154/60 to 70  The following portions of the patient's history were reviewed and updated as appropriate: allergies, current medications, past family history, past medical history, past social history, past surgical history and problem list     Review of Systems   Constitutional: Negative for chills, diaphoresis, fatigue and fever  HENT: Negative for ear discharge, ear pain, sinus pressure, sinus pain and sore throat  Eyes: Negative  Respiratory: Negative for cough, shortness of breath (with walking  Oxygen level at home was 95  ) and wheezing  Cardiovascular: Negative for chest pain and palpitations  Gastrointestinal: Negative  Genitourinary: Negative  Neurological: Positive for headaches  Negative for dizziness and light-headedness (when she bends over and feels foggy like prior to the surgery  )  Psychiatric/Behavioral: Positive for sleep disturbance (needs renewal on temazepam  )  Negative for dysphoric mood  The patient is not nervous/anxious  Still sad over the loss 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            /64   Pulse 60   Temp 98 2 °F (36 8 °C) (Tympanic)   Resp 18   Ht 4' 7" (1 397 m)   Wt 99 8 kg (220 lb)   SpO2 96%   BMI 51 13 kg/m²   Social History     Socioeconomic History    Marital status: Single     Spouse name: Not on file    Number of children: Not on file    Years of education: Not on file    Highest education level: Not on file   Occupational History    Not on file   Social Needs    Financial resource strain: Not on file    Food insecurity:     Worry: Not on file     Inability: Not on file    Transportation needs:     Medical: Not on file     Non-medical: Not on file   Tobacco Use    Smoking status: Never Smoker    Smokeless tobacco: Never Used   Substance and Sexual Activity    Alcohol use: No    Drug use: No    Sexual activity: Never   Lifestyle    Physical activity:     Days per week: Not on file     Minutes per session: Not on file    Stress: Not on file   Relationships    Social connections:     Talks on phone: Not on file     Gets together: Not on file     Attends [employer] service: Not on file     Active member of club or organization: Not on file     Attends meetings of clubs or organizations: Not on file     Relationship status: Not on file    Intimate partner violence:     Fear of current or ex partner: Not on file     Emotionally abused: Not on file     Physically abused: Not on file     Forced sexual activity: Not on file   Other Topics Concern    Not on file   Social History Narrative    Denied drinking coffee    Denied exercise habits     Past Medical History:   Diagnosis Date  Anemia 08/22/2018    Arthritis     AVB (atrioventricular block)     first degree    CHF (congestive heart failure) (HCC)     COPD, mild (HCC)     Coronary artery disease     Dislocation of right shoulder joint     Frequent UTI     GERD (gastroesophageal reflux disease)     H/O: pneumonia     Heme positive stool     History of uterine cancer     s/p CALLIE    Hyperlipidemia     Hypertension     Hypothyroidism     Obesity, morbid ([facility] [address] ) 08/22/2018    JANICE on CPAP     Pulmonary hypertension ([facility] ) 08/22/2018    Severe aortic stenosis     Simple goiter     Skin cyst     within the armpits, right     Family History   Problem Relation Age of Onset    Diabetes Mother     Stroke Mother     Cancer Father     Lung cancer Father     Diabetes Sister     Heart disease Sister     Coronary artery disease Family     Diabetes Family     Hypertension Family     Cancer Family     Stroke Family      Past Surgical History:   Procedure Laterality Date    BREAST BIOPSY      CARDIAC CATHETERIZATION      CARPAL TUNNEL RELEASE Bilateral     CHOLECYSTECTOMY      DILATION AND CURETTAGE OF UTERUS      HYSTEROSCOPY      MASTOID SURGERY      OR COLONOSCOPY FLX DX W/COLLJ SPEC WHEN PFRMD N/A 9/6/2018    Procedure: COLONOSCOPY;  Surgeon: Paresh Zapata MD;  [facility]; Service: Gastroenterology    OR ECHO TRANSESOPHAG R-T 2D W/PRB IMG ACQUISJ I&R N/A 10/9/2018    Procedure: INTRA-OP TRANSESOPHAGEAL ECHOCARDIOGRAM (GARRISON); Surgeon: Jose Cruz Roberts DO;  [facility];  Service: Cardiac Surgery    OR ESOPHAGOGASTRODUODENOSCOPY TRANSORAL DIAGNOSTIC N/A 8/31/2018    Procedure: ESOPHAGOGASTRODUODENOSCOPY (EGD); Surgeon: Paresh Zapata MD;  [facility];   Service: Gastroenterology    OR REPLACE AORTIC VALVE OPENFEMORAL ARTERY APPROACH N/A 10/9/2018    Procedure: REPLACEMENT AORTIC VALVE TRANSCATHETER (TAVR) TRANSFEMORAL W/ 23 MM MENDOZA NOE S3 VALVE (ACCESS OF LEFT); Surgeon: Stana Kussmaul, DO;  [facility];  Service: Cardiac Surgery    TOTAL ABDOMINAL HYSTERECTOMY      TOTAL HIP ARTHROPLASTY Left 2007    TOTAL KNEE ARTHROPLASTY Bilateral        Current Outpatient Medications:     albuterol (2 5 mg/3 mL) 0 083 % nebulizer solution, Take 1 vial (2 5 mg total) by nebulization every 6 (six) hours as needed for wheezing or shortness of breath, Disp: 360 mL, Rfl: 1    albuterol (PROVENTIL HFA,VENTOLIN HFA) 90 mcg/act inhaler, Inhale 2 puffs every 6 (six) hours as needed for wheezing, Disp: 1 Inhaler, Rfl: 3    amoxicillin-clavulanate (AUGMENTIN) 875-125 mg per tablet, Take 1 tablet by mouth every 12 (twelve) hours for 10 days, Disp: 20 tablet, Rfl: 0    aspirin (ECOTRIN LOW STRENGTH) 81 mg EC tablet, Take 1 tablet (81 mg total) by mouth daily, Disp: 100 tablet, Rfl: 0    b complex vitamins capsule, Take 1 capsule by mouth 2 (two) times a day  , Disp: , Rfl:     BREO ELLIPTA 100-25 MCG/INH inhaler, INHALE 1 PUFF DAILY RINSE MOUTH AFTER USE , Disp: 1 Inhaler, Rfl: 3    Calcium Carb-Cholecalciferol (CALCIUM 600 + D PO), Take 1 tablet by mouth 2 (two) times a day, Disp: , Rfl:     DUREZOL 0 05 % EMUL, INSTILL 1 DROP INTO RIGHT EYE FOUR TIMES A DAY AS DIRECTED FOR USE AFTER SURGERY, Disp: , Rfl: 3    Fesoterodine Fumarate ER (TOVIAZ) 8 MG TB24, Take 8 mg by mouth daily, Disp: , Rfl:     fluticasone (FLONASE) 50 mcg/act nasal spray, SPRAY 2 SPRAYS INTO EACH NOSTRIL EVERY DAY, Disp: , Rfl: 0    furosemide (LASIX) 20 mg tablet, TAKE 1 TABLET BY MOUTH EVERY DAY, Disp: 90 tablet, Rfl: 0    IRON PO, Take by mouth daily, Disp: , Rfl:     levothyroxine 50 mcg tablet, TAKE 1 TABLET (50 MCG TOTAL) BY MOUTH DAILY, Disp: 90 tablet, Rfl: 1    loratadine (CLARITIN) 10 mg tablet, Take 1 tablet (10 mg total) by mouth daily, Disp: 90 tablet, Rfl: 1    losartan (COZAAR) 25 mg tablet, Take 0 5 tablets (12 5 mg total) by mouth daily, Disp: 45 tablet, Rfl: 1    metoprolol succinate (TOPROL-XL) 25 mg 24 hr tablet, TAKE 1 TABLET BY MOUTH EVERY DAY, Disp: 90 tablet, Rfl: 0    mupirocin (BACTROBAN) 2 % cream, Apply topically 3 (three) times a day Behind the left tm , Disp: 15 g, Rfl: 0    nystatin (MYCOSTATIN) 500,000 units/5 mL suspension, Apply 5 mL (500,000 Units total) to the mouth or throat 4 (four) times a day, Disp: 474 mL, Rfl: 0    ofloxacin (OCUFLOX) 0 3 % ophthalmic solution, INSTILL 1 DROP INTO RIGHT EYE 4 X A DAY AS DIRECTED START3 DAYS PRIOR TO SURGERY IN OPERATIVE EYE, Disp: , Rfl: 3    omeprazole (PriLOSEC) 40 MG capsule, TAKE 1 CAPSULE BY MOUTH TWICE A DAY, Disp: 180 capsule, Rfl: 1    sertraline (ZOLOFT) 50 mg tablet, TAKE 1 5 TABLETS BY MOUTH DAILY, Disp: 135 tablet, Rfl: 1    simvastatin (ZOCOR) 40 mg tablet, TAKE 1 TABLET (40 MG TOTAL) BY MOUTH DAILY AT BEDTIME, Disp: 90 tablet, Rfl: 1    temazepam (RESTORIL) 7 5 mg capsule, Take 1 capsule (7 5 mg total) by mouth daily at bedtime as needed for sleep, Disp: 90 capsule, Rfl: 0    Allergies   Allergen Reactions    Latex Rash    Neosporin [Neomycin-Bacitracin Zn-Polymyx] Rash and Other (See Comments)     hives per [facility] order          Lab Review   No visits with results within 2 Month(s) from this visit  Latest known visit with results is:   Office Visit on 09/20/2019   Component Date Value    Throat Culture 09/20/2019 Negative for beta-hemolytic Streptococcus         Imaging: No results found  Objective:     Physical Exam   Constitutional: She is oriented to person, place, and time  She appears well-developed and well-nourished  No distress  HENT:   Head: Normocephalic and atraumatic  Right Ear: External ear normal    Mouth/Throat: Oropharynx is clear and moist    Left ear canal well healed  Eyes: Pupils are equal, round, and reactive to light  Conjunctivae and EOM are normal  Right eye exhibits no discharge  Left eye exhibits no discharge  Neck: Normal range of motion  Neck supple  Cardiovascular: Normal rate, regular rhythm and normal heart sounds  Pulmonary/Chest: Effort normal and breath sounds normal  No respiratory distress  Abdominal:   Abdomen is pendulous and obese  Musculoskeletal: She exhibits no edema, tenderness or deformity  Ambulates with a cane  Lymphadenopathy:     She has no cervical adenopathy  Neurological: She is alert and oriented to person, place, and time  No cranial nerve deficit  Skin: Skin is warm and dry  No rash noted  She is not diaphoretic  Psychiatric: She has a normal mood and affect  Her behavior is normal  Judgment and thought content normal          Patient Instructions   Discussed all with patient  The ear canal seems to be healing nicely but I am concerned about the lightheadedness as this is what you had prior to your surgery  Will attempt to get a CT scan attention to the ear and call with results  If this does not resolve we need to get you in to see Dr Tatyana White sooner than May of 2020  Ok to stop the antibiotics  BP's are wnl  Keep the f/u appt  MABEL Castro    Portions of the record may have been created with voice recognition software  Occasional wrong word or "sound a like" substitutions may have occurred due to the inherent limitations of voice recognition software  Read the chart carefully and recognize, using context, where substitutions have occurred

## 2020-02-03 ENCOUNTER — HOSPITAL ENCOUNTER (OUTPATIENT)
Dept: CT IMAGING | Facility: HOSPITAL | Age: 80
Discharge: HOME | End: 2020-02-03
Payer: MEDICARE

## 2020-02-03 DIAGNOSIS — H95.192 COMPLICATION FOLLOWING LEFT MASTOIDECTOMY: ICD-10-CM

## 2020-02-03 DIAGNOSIS — R42 LIGHTHEADEDNESS: ICD-10-CM

## 2020-02-03 PROCEDURE — 70450 CT HEAD/BRAIN W/O DYE: CPT

## 2020-02-04 ENCOUNTER — TELEPHONE (OUTPATIENT)
Dept: FAMILY MEDICINE CLINIC | Facility: CLINIC | Age: 80
End: 2020-02-04

## 2020-02-04 NOTE — TELEPHONE ENCOUNTER
----- Message from [address] [place] [address]  sent at 2/4/2020  7:36 AM EST -----  Ct scan looks ok but she does have fluid behind the left middle ear which could represent an ear infection  Does she have f/u with ent and how does the ear feel? If she has pain In the ear will treat for otitis media

## 2020-02-04 NOTE — TELEPHONE ENCOUNTER
Spoke with patient and she is aware  She said she is not witn any pain in her L ear and her next apt with ent is not soon but she does have one made   I did let her know if she starts to have any pain in that ear to call me and let me know so I can tell

## 2020-02-24 ENCOUNTER — OFFICE VISIT (OUTPATIENT)
Dept: PULMONOLOGY | Facility: CLINIC | Age: 80
End: 2020-02-24
Payer: MEDICARE

## 2020-02-24 VITALS
RESPIRATION RATE: 12 BRPM | WEIGHT: 221 LBS | SYSTOLIC BLOOD PRESSURE: 128 MMHG | HEART RATE: 66 BPM | OXYGEN SATURATION: 92 % | BODY MASS INDEX: 51.14 KG/M2 | DIASTOLIC BLOOD PRESSURE: 64 MMHG | TEMPERATURE: 98.4 F | HEIGHT: 55 IN

## 2020-02-24 DIAGNOSIS — I27.20 PULMONARY HYPERTENSION (HCC): Chronic | ICD-10-CM

## 2020-02-24 DIAGNOSIS — J45.20 MILD INTERMITTENT REACTIVE AIRWAY DISEASE WITHOUT COMPLICATION: ICD-10-CM

## 2020-02-24 DIAGNOSIS — J96.11 CHRONIC HYPOXEMIC RESPIRATORY FAILURE (HCC): ICD-10-CM

## 2020-02-24 DIAGNOSIS — Z99.89 OSA ON CPAP: Chronic | ICD-10-CM

## 2020-02-24 DIAGNOSIS — R06.02 SHORTNESS OF BREATH: Primary | ICD-10-CM

## 2020-02-24 DIAGNOSIS — G47.33 OSA ON CPAP: Chronic | ICD-10-CM

## 2020-02-24 PROCEDURE — 99214 OFFICE O/P EST MOD 30 MIN: CPT | Performed by: PHYSICIAN ASSISTANT

## 2020-02-24 PROCEDURE — 3074F SYST BP LT 130 MM HG: CPT | Performed by: PHYSICIAN ASSISTANT

## 2020-02-24 PROCEDURE — 1160F RVW MEDS BY RX/DR IN RCRD: CPT | Performed by: PHYSICIAN ASSISTANT

## 2020-02-24 PROCEDURE — 4040F PNEUMOC VAC/ADMIN/RCVD: CPT | Performed by: PHYSICIAN ASSISTANT

## 2020-02-24 PROCEDURE — 3078F DIAST BP <80 MM HG: CPT | Performed by: PHYSICIAN ASSISTANT

## 2020-02-24 PROCEDURE — 1036F TOBACCO NON-USER: CPT | Performed by: PHYSICIAN ASSISTANT

## 2020-02-24 PROCEDURE — 3008F BODY MASS INDEX DOCD: CPT | Performed by: PHYSICIAN ASSISTANT

## 2020-02-24 NOTE — PROGRESS NOTES
Assessment/Plan:   Diagnoses and all orders for this visit:    Shortness of breath    Mild intermittent reactive airway disease without complication    JANICE on CPAP    Chronic hypoxemic respiratory failure ([facility] [address] )    Pulmonary hypertension ([facility] [address] )     Patient is here today for follow-up  Shortness of breath is likely multifactorial due to underlying cardiac disease, obesity /deconditioning , pulmonary hypertension  She does require 2 L with exertion which she has not been using , she does use the oxygen at night with her CPAP but does not use it during the day despite multiple recommendations to use the oxygen during the day   This would likely help significantly with her dyspnea on exertion  She continues with Breo daily, does not use her albuterol on a regular basis  She is using her CPAP at night with oxygen , feels that her mouth is dry when she wakes in the morning   She is not sure if she has the humidifier on  I did tell her to follow-up with [facility] to have them help her with the machine  She will follow up with us in 6 months or sooner if necessary  Return in about 6 months (around 8/24/2020)  All questions are answered to the patient's satisfaction and understanding  She verbalizes understanding  She is encouraged to call with any further questions or concerns  Portions of the record may have been created with voice recognition software  Occasional wrong word or "sound a like" substitutions may have occurred due to the inherent limitations of voice recognition software  Read the chart carefully and recognize, using context, where substitutions have occurred      Electronically Signed by Paz Ritter PA-C    ______________________________________________________________________    Chief Complaint:   Chief Complaint   Patient presents with    Follow-up    Shortness of Breath     Walking, exertion       Patient ID: Uri Etienne is a [de-identified] y o  y o  female has a past medical history of Anemia (08/22/2018), Arthritis, AVB (atrioventricular block), CHF (congestive heart failure) ([facility] [address] ), COPD, mild ([facility] ), Coronary artery disease, Dislocation of right shoulder joint, Frequent UTI, GERD (gastroesophageal reflux disease), H/O: pneumonia, Heme positive stool, History of uterine cancer, Hyperlipidemia, Hypertension, Hypothyroidism, Obesity, morbid ([facility] ) (08/22/2018), JANICE on CPAP, Pulmonary hypertension ([facility] ) (08/22/2018), Severe aortic stenosis, Simple goiter, and Skin cyst     2/24/2020  Patient presents today for follow-up visit  Patient is ab 71-year-old female nonsmoker with past medical history of asthma, aortic stenosis status post TAVR, hypertension, JANICE on CPAP, hyperlipidemia, obesity, pulmonary hypertension, chronic respiratory failure on 2 L O2 with exertion and at night  She is here today for follow-up  She is overall stable with her breathing, continues to have dyspnea on exertion though she is still able to complete her daily activities  She continues with her [place], has not had much need for her albuterol  She is using her CPAP at night  She has not been using her oxygen during the day though has been using at night  We have discussed with her in the past the need for oxygen with exertion  Review of Systems   Constitutional: Negative  HENT: Negative  Respiratory: Positive for shortness of breath  Cardiovascular: Negative  Gastrointestinal: Negative  Genitourinary: Negative  Musculoskeletal: Negative  Skin: Negative  Allergic/Immunologic: Negative  Neurological: Negative  Psychiatric/Behavioral: Negative  Smoking history: She reports that she has never smoked   She has never used smokeless tobacco     The following portions of the patient's history were reviewed and updated as appropriate: allergies, current medications, past family history, past medical history, past social history, past surgical history and problem list     Immunization History   Administered Date(s) Administered    Pneumococcal Conjugate 13-Valent 06/19/2018    Pneumococcal Polysaccharide PPV23 07/01/2019     Current Outpatient Medications   Medication Sig Dispense Refill    albuterol (2 5 mg/3 mL) 0 083 % nebulizer solution Take 1 vial (2 5 mg total) by nebulization every 6 (six) hours as needed for wheezing or shortness of breath 360 mL 1    albuterol (PROVENTIL HFA,VENTOLIN HFA) 90 mcg/act inhaler Inhale 2 puffs every 6 (six) hours as needed for wheezing 1 Inhaler 3    aspirin (ECOTRIN LOW STRENGTH) 81 mg EC tablet Take 1 tablet (81 mg total) by mouth daily 100 tablet 0    b complex vitamins capsule Take 1 capsule by mouth 2 (two) times a day        BREO ELLIPTA 100-25 MCG/INH inhaler INHALE 1 PUFF DAILY RINSE MOUTH AFTER USE  1 Inhaler 3    Calcium Carb-Cholecalciferol (CALCIUM 600 + D PO) Take 1 tablet by mouth 2 (two) times a day      Fesoterodine Fumarate ER (TOVIAZ) 8 MG TB24 Take 8 mg by mouth daily      fluticasone (FLONASE) 50 mcg/act nasal spray SPRAY 2 SPRAYS INTO EACH NOSTRIL EVERY DAY  0    furosemide (LASIX) 20 mg tablet TAKE 1 TABLET BY MOUTH EVERY DAY 90 tablet 0    loratadine (CLARITIN) 10 mg tablet Take 1 tablet (10 mg total) by mouth daily 90 tablet 1    losartan (COZAAR) 25 mg tablet Take 0 5 tablets (12 5 mg total) by mouth daily 45 tablet 1    metoprolol succinate (TOPROL-XL) 25 mg 24 hr tablet TAKE 1 TABLET BY MOUTH EVERY DAY 90 tablet 0    mupirocin (BACTROBAN) 2 % cream Apply topically 3 (three) times a day Behind the left tm   15 g 0    nystatin (MYCOSTATIN) 500,000 units/5 mL suspension Apply 5 mL (500,000 Units total) to the mouth or throat 4 (four) times a day 474 mL 0    omeprazole (PriLOSEC) 40 MG capsule TAKE 1 CAPSULE BY MOUTH TWICE A  capsule 1    sertraline (ZOLOFT) 50 mg tablet TAKE 1 5 TABLETS BY MOUTH DAILY 135 tablet 1    simvastatin (ZOCOR) 40 mg tablet TAKE 1 TABLET (40 MG TOTAL) BY MOUTH DAILY AT BEDTIME 90 tablet 1    temazepam (RESTORIL) 7 5 mg capsule Take 1 capsule (7 5 mg total) by mouth daily at bedtime as needed for sleep 90 capsule 0    DUREZOL 0 05 % EMUL INSTILL 1 DROP INTO RIGHT EYE FOUR TIMES A DAY AS DIRECTED FOR USE AFTER SURGERY  3    IRON PO Take by mouth daily      levothyroxine 50 mcg tablet TAKE 1 TABLET (50 MCG TOTAL) BY MOUTH DAILY 90 tablet 1    ofloxacin (OCUFLOX) 0 3 % ophthalmic solution INSTILL 1 DROP INTO RIGHT EYE 4 X A DAY AS DIRECTED START3 DAYS PRIOR TO SURGERY IN OPERATIVE EYE  3     No current facility-administered medications for this visit  Allergies: Latex and Neosporin [neomycin-bacitracin zn-polymyx]    Objective:  Vitals:    02/24/20 1407   BP: 128/64   BP Location: Right arm   Patient Position: Sitting   Cuff Size: Standard   Pulse: 66   Resp: 12   Temp: 98 4 °F (36 9 °C)   TempSrc: Oral   SpO2: 92%   Weight: 100 kg (221 lb)   Height: 4' 6" (1 372 m)   Oxygen Therapy  SpO2: 92 %(RA resting)    Wt Readings from Last 3 Encounters:   02/24/20 100 kg (221 lb)   01/23/20 99 8 kg (220 lb)   01/21/20 101 kg (222 lb)     Body mass index is 53 29 kg/m²  Physical Exam   Constitutional: She is oriented to person, place, and time  She appears well-developed and well-nourished  No distress  HENT:   Mouth/Throat: Oropharynx is clear and moist    Crowded oropharyngeal airway   Eyes: Pupils are equal, round, and reactive to light  Neck:   Short and wide neck   Cardiovascular: Normal rate and regular rhythm  Murmur heard  Pulmonary/Chest: Effort normal and breath sounds normal  No accessory muscle usage  No respiratory distress  She has no decreased breath sounds  She has no wheezes  She has no rhonchi  She has no rales  Abdominal: Soft  There is no tenderness  Musculoskeletal: Normal range of motion  Neurological: She is alert and oriented to person, place, and time  Skin: Skin is warm and dry  No rash noted     Psychiatric: She has a normal mood and affect         Lab Review:   Lab Results   Component Value Date    K 4 4 08/16/2019     08/16/2019    CO2 29 08/16/2019    CO2 32 10/09/2018    BUN 17 08/16/2019    CREATININE 0 74 08/16/2019    GLUCOSE 101 10/09/2018    CALCIUM 9 9 08/16/2019     Lab Results   Component Value Date    WBC 10 43 (H) 08/16/2019    HGB 13 1 08/16/2019    HCT 43 2 08/16/2019    MCV 91 08/16/2019     08/16/2019       Diagnostics:    none pertinent  Office Spirometry Results:     ESS:

## 2020-03-05 ENCOUNTER — OFFICE VISIT (OUTPATIENT)
Dept: CARDIOLOGY CLINIC | Facility: CLINIC | Age: 80
End: 2020-03-05
Payer: MEDICARE

## 2020-03-05 VITALS
WEIGHT: 220 LBS | HEIGHT: 55 IN | DIASTOLIC BLOOD PRESSURE: 60 MMHG | SYSTOLIC BLOOD PRESSURE: 128 MMHG | HEART RATE: 62 BPM | OXYGEN SATURATION: 95 % | BODY MASS INDEX: 50.91 KG/M2

## 2020-03-05 DIAGNOSIS — E78.2 MIXED HYPERLIPIDEMIA: ICD-10-CM

## 2020-03-05 DIAGNOSIS — I10 ESSENTIAL HYPERTENSION: ICD-10-CM

## 2020-03-05 DIAGNOSIS — I27.20 PULMONARY HYPERTENSION (HCC): ICD-10-CM

## 2020-03-05 DIAGNOSIS — Z95.2 S/P TAVR (TRANSCATHETER AORTIC VALVE REPLACEMENT): ICD-10-CM

## 2020-03-05 DIAGNOSIS — G47.33 OSA (OBSTRUCTIVE SLEEP APNEA): ICD-10-CM

## 2020-03-05 DIAGNOSIS — I05.9 MITRAL ANNULAR CALCIFICATION: ICD-10-CM

## 2020-03-05 DIAGNOSIS — I35.0 SEVERE AORTIC STENOSIS: ICD-10-CM

## 2020-03-05 DIAGNOSIS — I50.32 CHRONIC DIASTOLIC CONGESTIVE HEART FAILURE (HCC): Primary | ICD-10-CM

## 2020-03-05 DIAGNOSIS — I51.7 LVH (LEFT VENTRICULAR HYPERTROPHY): ICD-10-CM

## 2020-03-05 DIAGNOSIS — E66.01 MORBID OBESITY (HCC): ICD-10-CM

## 2020-03-05 DIAGNOSIS — I34.2 NON-RHEUMATIC MITRAL VALVE STENOSIS: ICD-10-CM

## 2020-03-05 PROCEDURE — 99214 OFFICE O/P EST MOD 30 MIN: CPT | Performed by: INTERNAL MEDICINE

## 2020-03-05 PROCEDURE — 3078F DIAST BP <80 MM HG: CPT | Performed by: INTERNAL MEDICINE

## 2020-03-05 PROCEDURE — 3008F BODY MASS INDEX DOCD: CPT | Performed by: INTERNAL MEDICINE

## 2020-03-05 PROCEDURE — 1160F RVW MEDS BY RX/DR IN RCRD: CPT | Performed by: INTERNAL MEDICINE

## 2020-03-05 PROCEDURE — 4040F PNEUMOC VAC/ADMIN/RCVD: CPT | Performed by: INTERNAL MEDICINE

## 2020-03-05 PROCEDURE — 3074F SYST BP LT 130 MM HG: CPT | Performed by: INTERNAL MEDICINE

## 2020-03-05 PROCEDURE — 1036F TOBACCO NON-USER: CPT | Performed by: INTERNAL MEDICINE

## 2020-03-05 RX ORDER — CEPHALEXIN 500 MG/1
CAPSULE ORAL
COMMUNITY
Start: 2020-03-01 | End: 2020-03-15 | Stop reason: ALTCHOICE

## 2020-03-05 NOTE — PROGRESS NOTES
CARDIOLOGY OFFICE VISIT  [facility]  [address], [place], [address] [facility], [place], [facility] [place] Borden  Tel: [phone]      NAME: Elsie Garg  AGE: [de-identified] y o  SEX: female  : 1940   MRN: [id number]      Chief Complaint:  Chief Complaint   Patient presents with    Follow-up     6 months         History of Present Illness:    Shyla Alegria comes for follow-up  States her weight has been stable, no swelling in the legs and breathing has been stable  Denies chest pain, palpitations, lightheadedness, syncope, orthopnea, PND, claudication  Severe aortic stenosis S/P TAVR on 10/9/18 -  On ASA  Patient is aware of antibiotic prophylaxis prior to dental work    Chronic diastolic congestive heart failure -  Currently euvolemic  On prn furosemide  Cont beta-blocker, ARB       Hypertension, LVH, DD -  Has had it for many years  Takes her medications regularly  Denies lightheadedness, headache, medication side effects        Hyperlipidemia -  Has had it for few years  Takes her medications regularly  Denies myalgia  Her PCP closely monitor the blood work     Mitral stenosis -  Stable  Follow-up with serial echocardiograms    PHTN - from JANICE, valvular disease     Morbid obesity -  Unable to lose weight due to inability to exercise    JANICE - now uses CPAP   She does have history of pulmonary hypertension       Past Medical History:  Past Medical History:   Diagnosis Date    Anemia 2018    Arthritis     AVB (atrioventricular block)     first degree    CHF (congestive heart failure) (HCC)     COPD, mild (HCC)     Coronary artery disease     Dislocation of right shoulder joint     Frequent UTI     GERD (gastroesophageal reflux disease)     H/O: pneumonia     Heme positive stool     History of uterine cancer     s/p CALLIE    Hyperlipidemia     Hypertension     Hypothyroidism     Obesity, morbid ([facility] [address] ) 2018    JANICE on CPAP     Pulmonary hypertension ([facility] [address] ) 08/22/2018    Severe aortic stenosis     Simple goiter     Skin cyst     within the armpits, right         Past Surgical History:  Past Surgical History:   Procedure Laterality Date    BREAST BIOPSY      CARDIAC CATHETERIZATION      CARPAL TUNNEL RELEASE Bilateral     CHOLECYSTECTOMY      DILATION AND CURETTAGE OF UTERUS      HYSTEROSCOPY      MASTOID SURGERY      CA COLONOSCOPY FLX DX W/COLLJ SPEC WHEN PFRMD N/A 9/6/2018    Procedure: COLONOSCOPY;  Surgeon: Junito Dumont MD;  [facility]: [facility]; Service: Gastroenterology    CA ECHO TRANSESOPHAG R-T 2D W/PRB IMG ACQUISJ I&R N/A 10/9/2018    Procedure: INTRA-OP TRANSESOPHAGEAL ECHOCARDIOGRAM (GARRISON); Surgeon: Kamilah Angulo DO;  [facility];  Service: Cardiac Surgery    CA ESOPHAGOGASTRODUODENOSCOPY TRANSORAL DIAGNOSTIC N/A 8/31/2018    Procedure: ESOPHAGOGASTRODUODENOSCOPY (EGD); Surgeon: Junito Dumont MD;  [facility]; Service: Gastroenterology    CA REPLACE AORTIC VALVE OPENFEMORAL ARTERY APPROACH N/A 10/9/2018    Procedure: REPLACEMENT AORTIC VALVE TRANSCATHETER (TAVR) TRANSFEMORAL W/ 23 MM MENDOZA NOE S3 VALVE (ACCESS OF LEFT);   Surgeon: Kamilah Angulo DO;  [facility];  Service: Cardiac Surgery    TOTAL ABDOMINAL HYSTERECTOMY      TOTAL HIP ARTHROPLASTY Left 2007    TOTAL KNEE ARTHROPLASTY Bilateral          Family History:  Family History   Problem Relation Age of Onset    Diabetes Mother     Stroke Mother     Cancer Father     Lung cancer Father     Diabetes Sister     Heart disease Sister     Coronary artery disease Family     Diabetes Family     Hypertension Family     Cancer Family     Stroke Family          Social History:  Social History     Socioeconomic History    Marital status: Single     Spouse name: None    Number of children: None    Years of education: None    Highest education level: None   Occupational History    None   Social Needs    Financial resource strain: None    Food insecurity:     Worry: None     Inability: None    Transportation needs:     Medical: None     Non-medical: None   Tobacco Use    Smoking status: Never Smoker    Smokeless tobacco: Never Used   Substance and Sexual Activity    Alcohol use: No    Drug use: No    Sexual activity: Never   Lifestyle    Physical activity:     Days per week: None     Minutes per session: None    Stress: None   Relationships    Social connections:     Talks on phone: None     Gets together: None     Attends [employer] service: None     Active member of club or organization: None     Attends meetings of clubs or organizations: None     Relationship status: None    Intimate partner violence:     Fear of current or ex partner: None     Emotionally abused: None     Physically abused: None     Forced sexual activity: None   Other Topics Concern    None   Social History Narrative    Denied drinking coffee    Denied exercise habits         Active Problems:  Patient Active Problem List   Diagnosis    Hypothyroid    Hypertension    Hyperlipidemia    Reactive airway disease without complication    JANICE (obstructive sleep apnea)    LVH (left ventricular hypertrophy)    Mitral annular calcification    Mitral valve stenosis    Pulmonary hypertension (HCC)    Shortness of breath    Chronic diastolic (congestive) heart failure (HCC)    Anemia    Obesity, morbid (HCC)    Gastroesophageal reflux disease without esophagitis    Arthritis    AVB (atrioventricular block)    Chronic diastolic congestive heart failure (HCC)    COPD, mild (HCC)    Coronary artery disease    JANICE on CPAP    S/P TAVR (transcatheter aortic valve replacement)    Acute pulmonary insufficiency    Postoperative anemia due to acute blood loss    Encounter for postoperative care    Depression with anxiety    Chronic otitis media    Insomnia         The following portions of the patient's history were reviewed and updated as appropriate: past medical history, past surgical history, past family history,  past social history, current medications, allergies and problem list       Review of Systems:  Constitutional: Denies fever, chills  Eyes: Denies eye redness, eye discharge, double vision  ENT: Denies hearing loss, tinnitus, sneezing, nasal discharge, sore throat   Respiratory: Denies cough, expectoration, hemoptysis  Cardiovascular: Denies chest pain, palpitations, orthopnea, PND  Gastrointestinal: Denies abdominal pain, nausea, vomiting, hematemesis, diarrhea, bloody stools  Genito-Urinary: Denies dysuria, incontinence  Musculoskeletal: Denies back pain, joint pain, muscle pain  Neurologic: Denies confusion, lightheadedness, syncope, headache, focal weakness, sensory changes, seizures  Endocrine: Denies polyuria, polydipsia, temperature intolerance  Allergy and Immunology: Denies hives, insect bite sensitivity  Hematological and Lymphatic: Denies bleeding problems, swollen glands   Psychological: Denies depression, suicidal ideation, anxiety, panic  Dermatological: Denies pruritus, rash, skin lesion changes        Vitals:  Vitals:    03/05/20 1401   BP: 128/60   Pulse: 62   SpO2: 95%       Body mass index is 53 04 kg/m²  Weight (last 2 days)     Date/Time   Weight    03/05/20 1401   99 8 (220)              Physical Examination:  General: Morbidly obese  Using a walker for support  Patient is not in acute distress  Awake, alert, oriented in time, place and person  Responding to commands  Head: Normocephalic  Atraumatic  Eyes: Both pupils normal sized, round and reactive to light  Nonicteric  ENT: Normal external ear canals  Nares normal, no drainage  Lips and oral mucosa normal  Neck: Supple  JVP not raised  Trachea central  No thyromegaly  Lungs: Bilateral bronchovascular breath sounds with no crackles or rhonchi  Chest wall: No tenderness  Cardiovascular: RRR   S1 and S2 normal  Crisp mechanical prosthetic valve sound+  Gastrointestinal: Abdomen soft, nontender  No guarding or rigidity  Liver and spleen not palpable  Bowel sounds present  Neurologic: Patient is awake, alert, oriented in time, place and person  Responding to command  Moving all extremities  Integumentary:  No skin rash  Lymphatic: No cervical lymphadenopathy  Back: Symmetric   No CVA tenderness  Extremities: No clubbing, cyanosis or edema      Laboratory Results:  CBC with diff:   Lab Results   Component Value Date    WBC 10 43 (H) 2019    RBC 4 76 2019    HGB 13 1 2019    HCT 43 2 2019    MCV 91 2019    MCH 27 5 2019    RDW 14 8 2019     2019       CMP:  Lab Results   Component Value Date    CREATININE 0 74 2019    BUN 17 2019    K 4 4 2019     2019    CO2 29 2019    CO2 32 10/09/2018    GLUCOSE 101 10/09/2018    ALKPHOS 84 2019    ALT 22 2019    AST 27 2019       Lab Results   Component Value Date    HGBA1C 5 7 2018    MG 2 0 2018       Lab Results   Component Value Date    TROPONINI <0 02 2018    TROPONINI <0 02 2018    TROPONINI <0 02 2018       Lipid Profile:   No results found for: CHOL  Lab Results   Component Value Date    HDL 59 08/15/2018    HDL 65 (H) 2018     Lab Results   Component Value Date    LDLCALC 72 08/15/2018    LDLCALC 51 2018     Lab Results   Component Value Date    TRIG 112 08/15/2018    TRIG 83 2018       Cardiac testing:   Results for orders placed during the hospital encounter of 18   Echo complete with contrast if indicated    Narrative [address] [place] [id number] [phone]    Transthoracic Echocardiogram  2D, M-mode, Doppler, and Color Doppler    Study date:  14-Aug-2018    Patient: Alexandru Anders  MR number: [id number]  Account number: [de-identified]  : 1940  Age: 65 years  Gender: Female  Status: Inpatient  Location: Bedside  Height: 57 in  Weight: 224 lb  BP: 139/ 60 mmHg    Indications: Dyspnea, shortness of breath    Diagnoses: R06 00 - Dyspnea, unspecified    Sonographer:  Candie Feldman [facility]  Interpreting Physician:  Suze Etienne MD  Referring Physician:  Neel Kellogg PA-C  Group:  [facility]    SUMMARY    LEFT VENTRICLE:  Systolic function was normal  Ejection fraction was estimated in the range of 55 % to 65 %  There were no regional wall motion abnormalities  There was mild concentric hypertrophy  Doppler parameters were consistent with abnormal left ventricular relaxation (grade 1 diastolic dysfunction)  LEFT ATRIUM:  The atrium was mildly dilated  MITRAL VALVE:  There was mild annular calcification  AORTIC VALVE:  The valve was not visualized well enough to rule out a bicuspid morphology  Leaflets exhibited marked calcification and markedly reduced cuspal separation  Transaortic velocity was increased due to valvular stenosis  There was moderate to severe stenosis  There was mild regurgitation  TRICUSPID VALVE:  There was mild regurgitation  Estimated peak PA pressure was 40 mmHg  HISTORY: PRIOR HISTORY: Risk factors: CAD, hypertension, hypercholesterolemia, JANICE and morbid obesity  PROCEDURE: The procedure was performed at the bedside  This was a routine study  The transthoracic approach was used  The study included complete 2D imaging, M-mode, complete spectral Doppler, and color Doppler  The heart rate was 66 bpm,  at the start of the study  Images were obtained from the parasternal, apical, subcostal, and suprasternal notch acoustic windows  Echocardiographic views were limited due to decreased penetration and lung interference  Image quality was  adequate  LEFT VENTRICLE: Size was normal  Systolic function was normal  Ejection fraction was estimated in the range of 55 % to 65 %  There were no regional wall motion abnormalities   There was mild concentric hypertrophy  DOPPLER: Doppler  parameters were consistent with abnormal left ventricular relaxation (grade 1 diastolic dysfunction)  RIGHT VENTRICLE: The size was normal  Systolic function was normal  Wall thickness was normal     LEFT ATRIUM: The atrium was mildly dilated  RIGHT ATRIUM: Size was normal     MITRAL VALVE: There was mild annular calcification  Valve structure was normal  There was normal leaflet separation  DOPPLER: The transmitral velocity was within the normal range  There was no evidence for stenosis  There was no  regurgitation  AORTIC VALVE: The valve was not visualized well enough to rule out a bicuspid morphology  Leaflets exhibited marked calcification and markedly reduced cuspal separation  DOPPLER: Transaortic velocity was increased due to valvular stenosis  There was moderate to severe stenosis  There was mild regurgitation  TRICUSPID VALVE: The valve structure was normal  There was normal leaflet separation  DOPPLER: The transtricuspid velocity was within the normal range  There was no evidence for stenosis  There was mild regurgitation  Estimated peak PA  pressure was 40 mmHg  PULMONIC VALVE: Leaflets exhibited normal thickness, no calcification, and normal cuspal separation  DOPPLER: The transpulmonic velocity was within the normal range  There was no regurgitation  PERICARDIUM: There was no pericardial effusion  The pericardium was normal in appearance  AORTA: The root exhibited normal size  SYSTEMIC VEINS: IVC: The inferior vena cava was normal in size and course   Respirophasic changes were normal     SYSTEM MEASUREMENT TABLES    2D  %FS: 36 9 %  Ao Diam: 3 2 cm  EDV(Teich): 89 2 ml  EF(Teich): 67 %  ESV(Teich): 29 5 ml  IVSd: 1 2 cm  LA Area: 25 9 cm2  LA Diam: 4 1 cm  LVEDV MOD A4C: 83 7 ml  LVEF MOD A4C: 83 2 %  LVESV MOD A4C: 14 ml  LVIDd: 4 4 cm  LVIDs: 2 8 cm  LVLd A4C: 8 2 cm  LVLs A4C: 5 9 cm  LVOT Diam: 2 1 cm  LVPWd: 1 2 cm  RA Area: 18 4 cm2  RVIDd: 4 4 cm  SV MOD A4C: 69 6 ml  SV(Teich): 59 7 ml    CW  AR Dec Douglas: 2 4 m/s2  AR Dec Time: 1508 3 ms  AR PHT: 437 4 ms  AR Vmax: 3 6 m/s  AR maxP 4 mmHg  AV Env  Ti: 296 9 ms  AV VTI: 90 6 cm  AV Vmax: 4 1 m/s  AV Vmean: 3 1 m/s  AV maxP 1 mmHg  AV meanP 1 mmHg  TR Vmax: 3 3 m/s  TR maxP 3 mmHg    MM  TAPSE: 2 4 cm    PW  VASU (VTI): 1 3 cm2  VASU Vmax: 1 cm2  E': 0 1 m/s  E/E': 18  LVOT Env  Ti: 388 2 ms  LVOT VTI: 34 7 cm  LVOT Vmax: 1 3 m/s  LVOT Vmean: 0 9 m/s  LVOT maxP 6 mmHg  LVOT meanPG: 3 8 mmHg  LVSV Dopp: 114 6 ml  MV A Jose: 1 4 m/s  MV Dec Douglas: 1 8 m/s2  MV DecT: 541 5 ms  MV E Jose: 1 m/s  MV E/A Ratio: 0 7  MV PHT: 157 ms  MVA By PHT: 1 4 cm2    [employer] Accredited Echocardiography Laboratory    Prepared and electronically signed by    Cheri Hidalgo MD  Signed 14-Aug-2018 18:35:14         Medications:    Current Outpatient Medications:     albuterol (2 5 mg/3 mL) 0 083 % nebulizer solution, Take 1 vial (2 5 mg total) by nebulization every 6 (six) hours as needed for wheezing or shortness of breath, Disp: 360 mL, Rfl: 1    albuterol (PROVENTIL HFA,VENTOLIN HFA) 90 mcg/act inhaler, Inhale 2 puffs every 6 (six) hours as needed for wheezing, Disp: 1 Inhaler, Rfl: 3    aspirin (ECOTRIN LOW STRENGTH) 81 mg EC tablet, Take 1 tablet (81 mg total) by mouth daily, Disp: 100 tablet, Rfl: 0    b complex vitamins capsule, Take 1 capsule by mouth 2 (two) times a day  , Disp: , Rfl:     BREO ELLIPTA 100-25 MCG/INH inhaler, INHALE 1 PUFF DAILY RINSE MOUTH AFTER USE , Disp: 1 Inhaler, Rfl: 3    Calcium Carb-Cholecalciferol (CALCIUM 600 + D PO), Take 1 tablet by mouth 2 (two) times a day, Disp: , Rfl:     cephalexin (KEFLEX) 500 mg capsule, , Disp: , Rfl:     Cranberry 250 MG TABS, Take by mouth, Disp: , Rfl:     DUREZOL 0 05 % EMUL, INSTILL 1 DROP INTO RIGHT EYE FOUR TIMES A DAY AS DIRECTED FOR USE AFTER SURGERY, Disp: , Rfl: 3    Fesoterodine Fumarate ER (TOVIAZ) 8 MG TB24, Take 8 mg by mouth daily, Disp: , Rfl:     furosemide (LASIX) 20 mg tablet, TAKE 1 TABLET BY MOUTH EVERY DAY, Disp: 90 tablet, Rfl: 0    IRON PO, Take by mouth daily, Disp: , Rfl:     levothyroxine 50 mcg tablet, TAKE 1 TABLET (50 MCG TOTAL) BY MOUTH DAILY, Disp: 90 tablet, Rfl: 1    loratadine (CLARITIN) 10 mg tablet, Take 1 tablet (10 mg total) by mouth daily, Disp: 90 tablet, Rfl: 1    losartan (COZAAR) 25 mg tablet, Take 0 5 tablets (12 5 mg total) by mouth daily, Disp: 45 tablet, Rfl: 1    metoprolol succinate (TOPROL-XL) 25 mg 24 hr tablet, TAKE 1 TABLET BY MOUTH EVERY DAY, Disp: 90 tablet, Rfl: 0    mupirocin (BACTROBAN) 2 % cream, Apply topically 3 (three) times a day Behind the left tm , Disp: 15 g, Rfl: 0    nystatin (MYCOSTATIN) 500,000 units/5 mL suspension, Apply 5 mL (500,000 Units total) to the mouth or throat 4 (four) times a day, Disp: 474 mL, Rfl: 0    omeprazole (PriLOSEC) 40 MG capsule, TAKE 1 CAPSULE BY MOUTH TWICE A DAY, Disp: 180 capsule, Rfl: 1    sertraline (ZOLOFT) 50 mg tablet, TAKE 1 5 TABLETS BY MOUTH DAILY, Disp: 135 tablet, Rfl: 1    simvastatin (ZOCOR) 40 mg tablet, TAKE 1 TABLET (40 MG TOTAL) BY MOUTH DAILY AT BEDTIME, Disp: 90 tablet, Rfl: 1    temazepam (RESTORIL) 7 5 mg capsule, Take 1 capsule (7 5 mg total) by mouth daily at bedtime as needed for sleep, Disp: 90 capsule, Rfl: 0    fluticasone (FLONASE) 50 mcg/act nasal spray, SPRAY 2 SPRAYS INTO EACH NOSTRIL EVERY DAY, Disp: , Rfl: 0    ofloxacin (OCUFLOX) 0 3 % ophthalmic solution, INSTILL 1 DROP INTO RIGHT EYE 4 X A DAY AS DIRECTED START3 DAYS PRIOR TO SURGERY IN OPERATIVE EYE, Disp: , Rfl: 3      Allergies: Allergies   Allergen Reactions    Latex Rash    Neosporin [Neomycin-Bacitracin Zn-Polymyx] Rash and Other (See Comments)     hives per [facility] order       EKG:  Read by me   06/04/2019  Sinus rhythm  LAD  Poor R-wave progression in precordial leads    LVH    Assessment and Plan:  1  Severe aortic stenosis S/P TAVR   continue aspirin  Aware that she needs antibiotic prophylaxis prior to dental work    2  Chronic diastolic (congestive) heart failure (HCC)   low-salt diet  Daily weight  On prn furosemide / K  Cont beta-blocker, ARB    3  Mitral annular calcification, Non-rheumatic mitral valve stenosis   follow up with serial echocardiograms    4  Essential hypertension   BP stable  Cont beta-blocker    5  Mixed hyperlipidemia   continue statin and diet control  Her PCP closely monitors her blood work    6  LVH (left ventricular hypertrophy)   tight BP control    7  Pulmonary hypertension (HCC)   likely from JANICE, MS, AS    8  Morbid obesity ([address] )   unable to lose weight    9  JANICE (obstructive sleep apnea)   regular CPAP use promoted    Recommend aggressive risk factor modification and therapeutic lifestyle changes  Low-salt, low-calorie, low-fat, low-cholesterol diet with regular exercise and to optimize weight  I will defer the ordering and monitoring of necessity lab studies to you, but I am available and happy to review and manage any of the data at your request in the future  Discussed concepts of atherosclerosis, including signs and symptoms of cardiac disease  Previous studies were reviewed  Safety measures were reviewed  Questions were entertained and answered  Patient was advised to report any problems requiring medical attention  Follow-up with PCP and appropriate specialist and lab work as discussed  Return for follow up visit as scheduled or earlier, if needed  Thank you for allowing me to participate in the care and evaluation of your patient  Should you have any questions, please feel free to contact me        Luis Barreto MD  3/5/2020,2:42 PM

## 2020-03-12 ENCOUNTER — TELEPHONE (OUTPATIENT)
Dept: FAMILY MEDICINE CLINIC | Facility: CLINIC | Age: 80
End: 2020-03-12

## 2020-03-12 NOTE — TELEPHONE ENCOUNTER
Pt called she has to take antibiotics before and dental treatment  She has a script of Amoxicillin 500mg that was prescribed for Pharyngitis, unspecified etiology  Pt would like to know can she take it because she has a dentist appt coming up ?

## 2020-03-12 NOTE — TELEPHONE ENCOUNTER
Yes, 2000 mg of amoxicillin is fine, but why does she need endocarditis prophylaxis  Is it because of her tavr?

## 2020-03-15 DIAGNOSIS — Z29.8 SBE (SUBACUTE BACTERIAL ENDOCARDITIS) PROPHYLAXIS CANDIDATE: Primary | ICD-10-CM

## 2020-03-15 RX ORDER — CEPHALEXIN 500 MG/1
CAPSULE ORAL
OUTPATIENT
Start: 2020-03-15

## 2020-03-15 RX ORDER — AMOXICILLIN 500 MG/1
CAPSULE ORAL
Qty: 30 CAPSULE | Refills: 0 | Status: SHIPPED | OUTPATIENT
Start: 2020-03-15 | End: 2020-06-15

## 2020-03-21 DIAGNOSIS — I35.0 AORTIC STENOSIS, SEVERE: ICD-10-CM

## 2020-03-21 DIAGNOSIS — Z95.2 S/P TAVR (TRANSCATHETER AORTIC VALVE REPLACEMENT): ICD-10-CM

## 2020-03-23 DIAGNOSIS — I50.32 CHRONIC DIASTOLIC (CONGESTIVE) HEART FAILURE (HCC): ICD-10-CM

## 2020-03-23 RX ORDER — LOSARTAN POTASSIUM 25 MG/1
12.5 TABLET ORAL DAILY
Qty: 45 TABLET | Refills: 3 | Status: SHIPPED | OUTPATIENT
Start: 2020-03-23 | End: 2020-06-22

## 2020-03-23 RX ORDER — SIMVASTATIN 40 MG
40 TABLET ORAL
Qty: 90 TABLET | Refills: 1 | Status: SHIPPED | OUTPATIENT
Start: 2020-03-23 | End: 2020-09-21

## 2020-03-23 NOTE — TELEPHONE ENCOUNTER
Please send refill to [facility] pharmacy on file for a 90 day supply

## 2020-03-26 ENCOUNTER — TELEMEDICINE (OUTPATIENT)
Dept: FAMILY MEDICINE CLINIC | Facility: CLINIC | Age: 80
End: 2020-03-26
Payer: MEDICARE

## 2020-03-26 DIAGNOSIS — R06.02 SHORTNESS OF BREATH: Primary | ICD-10-CM

## 2020-03-26 DIAGNOSIS — F41.8 DEPRESSION WITH ANXIETY: ICD-10-CM

## 2020-03-26 DIAGNOSIS — I10 ESSENTIAL HYPERTENSION: Chronic | ICD-10-CM

## 2020-03-26 PROCEDURE — G2012 BRIEF CHECK IN BY MD/QHP: HCPCS | Performed by: FAMILY MEDICINE

## 2020-03-26 NOTE — PATIENT INSTRUCTIONS
Discussed with pt  She seems to be doing well with no further lightheadedness  Continue same meds  F/U here with bp's from home at next visit when able to leave the house

## 2020-03-26 NOTE — ASSESSMENT & PLAN NOTE
Advised to check bp's at home periodically  Bring to f/u when able to leave home  Continue current meds

## 2020-03-26 NOTE — PROGRESS NOTES
Virtual Regular Visit    Problem List Items Addressed This Visit        Cardiovascular and Mediastinum    Hypertension (Chronic)     Advised to check bp's at home periodically  Bring to f/u when able to leave home  Continue current meds  Other    Shortness of breath - Primary     Intermittently persistent, but better per pt  Depression with anxiety     Stable on meds  Reason for visit is for routine f/u appt  Encounter provider MABEL Mcnally    Provider located at [facility] [phone] [facility]  [address] [facility] [id number] [phone]      Recent Visits  No visits were found meeting these conditions  Showing recent visits within past 7 days and meeting all other requirements     Today's Visits  Date Type Provider Dept   03/26/20 Telemedicine Rosemary Figueredo, [address] today's visits and meeting all other requirements     Future Appointments  No visits were found meeting these conditions  Showing future appointments within next 150 days and meeting all other requirements        After connecting through [employer], the patient was identified by name and date of birth  Geraldo Weathers was informed that this is a telemedicine visit and that the visit is being conducted through telephone which may not be secure and therefore, might not be HIPAA-compliant  My office door was closed  No one else was in the room  She acknowledged consent and understanding of privacy and security of the video platform  The patient has agreed to participate and understands they can discontinue the visit at any time  Subjective  Geraldo Weathers is a [de-identified] y o  female called for routine f/u appt as per her request  Pt feels pretty well  Head is feeling better  Left ear feels ok  Canceled her dental appt  Pt does not need refills    Taking cranberry pills as thought she had a uti  Not checking bp's at home  Takes all other meds as directed  No side effects noted  Past Medical History:   Diagnosis Date    Anemia 08/22/2018    Arthritis     AVB (atrioventricular block)     first degree    CHF (congestive heart failure) (HCC)     COPD, mild (HCC)     Coronary artery disease     Dislocation of right shoulder joint     Frequent UTI     GERD (gastroesophageal reflux disease)     H/O: pneumonia     Heme positive stool     History of uterine cancer     s/p CALLIE    Hyperlipidemia     Hypertension     Hypothyroidism     Obesity, morbid ([facility] [address] 08/22/2018    JANICE on CPAP     Pulmonary hypertension ([facility] [address] ) 08/22/2018    Severe aortic stenosis     Simple goiter     Skin cyst     within the armpits, right       Past Surgical History:   Procedure Laterality Date    BREAST BIOPSY      CARDIAC CATHETERIZATION      CARPAL TUNNEL RELEASE Bilateral     CHOLECYSTECTOMY      DILATION AND CURETTAGE OF UTERUS      HYSTEROSCOPY      MASTOID SURGERY      AR COLONOSCOPY FLX DX W/COLLJ SPEC WHEN PFRMD N/A 9/6/2018    Procedure: COLONOSCOPY;  Surgeon: Yamile Rodriguez MD;  Location: [facility]; Service: Gastroenterology    AR ECHO TRANSESOPHAG R-T 2D W/PRB IMG ACQUISJ I&R N/A 10/9/2018    Procedure: INTRA-OP TRANSESOPHAGEAL ECHOCARDIOGRAM (GARRISON); Surgeon: Guerda Franco DO;  [facility]: [facility];  Service: Cardiac Surgery    AR ESOPHAGOGASTRODUODENOSCOPY TRANSORAL DIAGNOSTIC N/A 8/31/2018    Procedure: ESOPHAGOGASTRODUODENOSCOPY (EGD); Surgeon: Yamile Rodriguez MD;  [facility]: [facility]; Service: Gastroenterology    AR REPLACE AORTIC VALVE OPENFEMORAL ARTERY APPROACH N/A 10/9/2018    Procedure: REPLACEMENT AORTIC VALVE TRANSCATHETER (TAVR) TRANSFEMORAL W/ 23 MM MENDOZA NOE S3 VALVE (ACCESS OF LEFT);   Surgeon: Guerda Franco DO;  [facility];  Service: Cardiac Surgery    TOTAL ABDOMINAL HYSTERECTOMY      TOTAL HIP ARTHROPLASTY Left 2007    TOTAL KNEE ARTHROPLASTY Bilateral        Current Outpatient Medications   Medication Sig Dispense Refill    albuterol (2 5 mg/3 mL) 0 083 % nebulizer solution Take 1 vial (2 5 mg total) by nebulization every 6 (six) hours as needed for wheezing or shortness of breath 360 mL 1    albuterol (PROVENTIL HFA,VENTOLIN HFA) 90 mcg/act inhaler Inhale 2 puffs every 6 (six) hours as needed for wheezing 1 Inhaler 3    amoxicillin (AMOXIL) 500 mg capsule Take 4 caps prior to dental procedures  30 capsule 0    aspirin (ECOTRIN LOW STRENGTH) 81 mg EC tablet Take 1 tablet (81 mg total) by mouth daily 100 tablet 0    b complex vitamins capsule Take 1 capsule by mouth 2 (two) times a day        BREO ELLIPTA 100-25 MCG/INH inhaler INHALE 1 PUFF DAILY RINSE MOUTH AFTER USE  1 Inhaler 3    Calcium Carb-Cholecalciferol (CALCIUM 600 + D PO) Take 1 tablet by mouth 2 (two) times a day      Cranberry 250 MG TABS Take by mouth      DUREZOL 0 05 % EMUL INSTILL 1 DROP INTO RIGHT EYE FOUR TIMES A DAY AS DIRECTED FOR USE AFTER SURGERY  3    Fesoterodine Fumarate ER (TOVIAZ) 8 MG TB24 Take 8 mg by mouth daily      fluticasone (FLONASE) 50 mcg/act nasal spray SPRAY 2 SPRAYS INTO EACH NOSTRIL EVERY DAY  0    furosemide (LASIX) 20 mg tablet TAKE 1 TABLET BY MOUTH EVERY DAY 90 tablet 0    IRON PO Take by mouth daily      levothyroxine 50 mcg tablet TAKE 1 TABLET (50 MCG TOTAL) BY MOUTH DAILY 90 tablet 1    loratadine (CLARITIN) 10 mg tablet Take 1 tablet (10 mg total) by mouth daily 90 tablet 1    losartan (COZAAR) 25 mg tablet Take 0 5 tablets (12 5 mg total) by mouth daily 45 tablet 3    metoprolol succinate (TOPROL-XL) 25 mg 24 hr tablet TAKE 1 TABLET BY MOUTH EVERY DAY 90 tablet 0    mupirocin (BACTROBAN) 2 % cream Apply topically 3 (three) times a day Behind the left tm   15 g 0    nystatin (MYCOSTATIN) 500,000 units/5 mL suspension Apply 5 mL (500,000 Units total) to the mouth or throat 4 (four) times a day 474 mL 0    ofloxacin (OCUFLOX) 0 3 % ophthalmic solution INSTILL 1 DROP INTO RIGHT EYE 4 X A DAY AS DIRECTED START3 DAYS PRIOR TO SURGERY IN OPERATIVE EYE  3    omeprazole (PriLOSEC) 40 MG capsule TAKE 1 CAPSULE BY MOUTH TWICE A  capsule 1    sertraline (ZOLOFT) 50 mg tablet TAKE 1 5 TABLETS BY MOUTH DAILY 135 tablet 1    simvastatin (ZOCOR) 40 mg tablet TAKE 1 TABLET (40 MG TOTAL) BY MOUTH DAILY AT BEDTIME 90 tablet 1    temazepam (RESTORIL) 7 5 mg capsule Take 1 capsule (7 5 mg total) by mouth daily at bedtime as needed for sleep 90 capsule 0     No current facility-administered medications for this visit  Allergies   Allergen Reactions    Latex Rash    Neosporin [Neomycin-Bacitracin Zn-Polymyx] Rash and Other (See Comments)     hives per [facility] order       Review of Systems   Constitutional: Negative for chills, diaphoresis, fatigue and fever  HENT: Negative  Eyes: Negative  Respiratory: Positive for cough (rare ) and shortness of breath (at times)  Negative for wheezing  Cardiovascular: Negative for chest pain and palpitations  Gastrointestinal: Negative  Genitourinary: Negative  Neurological: Negative for dizziness, light-headedness and headaches  Psychiatric/Behavioral: Negative for dysphoric mood  The patient is nervous/anxious (about corona virus)  Still sad over losing her sister  Disposition: Discussed with pt  She seems to be doing well with no further lightheadedness  Continue same meds  F/U here with bp's from home at next visit when able to leave the house  I spent 10 minutes with the patient during this visit

## 2020-03-30 DIAGNOSIS — I50.32 CHRONIC DIASTOLIC (CONGESTIVE) HEART FAILURE (HCC): ICD-10-CM

## 2020-03-30 RX ORDER — METOPROLOL SUCCINATE 25 MG/1
25 TABLET, EXTENDED RELEASE ORAL DAILY
Qty: 90 TABLET | Refills: 3 | Status: SHIPPED | OUTPATIENT
Start: 2020-03-30 | End: 2021-02-22

## 2020-04-09 DIAGNOSIS — E03.9 ACQUIRED HYPOTHYROIDISM: ICD-10-CM

## 2020-04-09 RX ORDER — LEVOTHYROXINE SODIUM 0.05 MG/1
TABLET ORAL
Qty: 90 TABLET | Refills: 1 | Status: SHIPPED | OUTPATIENT
Start: 2020-04-09 | End: 2020-10-07

## 2020-04-22 DIAGNOSIS — G47.00 INSOMNIA, UNSPECIFIED TYPE: ICD-10-CM

## 2020-04-22 DIAGNOSIS — I50.32 CHRONIC DIASTOLIC CONGESTIVE HEART FAILURE (HCC): Chronic | ICD-10-CM

## 2020-04-22 RX ORDER — TEMAZEPAM 7.5 MG/1
7.5 CAPSULE ORAL
Qty: 90 CAPSULE | Refills: 0 | Status: SHIPPED | OUTPATIENT
Start: 2020-04-22 | End: 2020-05-04

## 2020-04-23 RX ORDER — FUROSEMIDE 20 MG/1
20 TABLET ORAL DAILY
Qty: 90 TABLET | Refills: 3 | Status: SHIPPED | OUTPATIENT
Start: 2020-04-23 | End: 2021-06-10 | Stop reason: ALTCHOICE

## 2020-05-04 DIAGNOSIS — G47.00 INSOMNIA, UNSPECIFIED TYPE: ICD-10-CM

## 2020-05-04 RX ORDER — TEMAZEPAM 7.5 MG/1
7.5 CAPSULE ORAL
Qty: 90 CAPSULE | Refills: 0 | Status: SHIPPED | OUTPATIENT
Start: 2020-05-04 | End: 2020-07-27 | Stop reason: SDUPTHER

## 2020-05-12 ENCOUNTER — TELEPHONE (OUTPATIENT)
Dept: FAMILY MEDICINE CLINIC | Facility: CLINIC | Age: 80
End: 2020-05-12

## 2020-05-12 DIAGNOSIS — F41.8 DEPRESSION WITH ANXIETY: ICD-10-CM

## 2020-05-12 RX ORDER — SERTRALINE HYDROCHLORIDE 100 MG/1
100 TABLET, FILM COATED ORAL DAILY
Qty: 30 TABLET | Refills: 0
Start: 2020-05-12 | End: 2020-05-19 | Stop reason: SDUPTHER

## 2020-05-19 DIAGNOSIS — F41.8 DEPRESSION WITH ANXIETY: ICD-10-CM

## 2020-05-19 RX ORDER — SERTRALINE HYDROCHLORIDE 100 MG/1
100 TABLET, FILM COATED ORAL DAILY
Qty: 30 TABLET | Refills: 3 | Status: SHIPPED | OUTPATIENT
Start: 2020-05-19 | End: 2020-05-22 | Stop reason: SDUPTHER

## 2020-05-22 DIAGNOSIS — F41.8 DEPRESSION WITH ANXIETY: ICD-10-CM

## 2020-05-22 RX ORDER — SERTRALINE HYDROCHLORIDE 100 MG/1
100 TABLET, FILM COATED ORAL DAILY
Qty: 90 TABLET | Refills: 0 | Status: SHIPPED | OUTPATIENT
Start: 2020-05-22 | End: 2020-11-06

## 2020-06-16 ENCOUNTER — OFFICE VISIT (OUTPATIENT)
Dept: FAMILY MEDICINE CLINIC | Facility: CLINIC | Age: 80
End: 2020-06-16
Payer: MEDICARE

## 2020-06-16 VITALS
HEIGHT: 55 IN | OXYGEN SATURATION: 98 % | BODY MASS INDEX: 52.07 KG/M2 | HEART RATE: 68 BPM | WEIGHT: 225 LBS | SYSTOLIC BLOOD PRESSURE: 120 MMHG | DIASTOLIC BLOOD PRESSURE: 78 MMHG

## 2020-06-16 DIAGNOSIS — R06.02 SOB (SHORTNESS OF BREATH): Primary | ICD-10-CM

## 2020-06-16 DIAGNOSIS — J41.0 SIMPLE CHRONIC BRONCHITIS (HCC): ICD-10-CM

## 2020-06-16 DIAGNOSIS — E66.01 CLASS 3 SEVERE OBESITY WITH SERIOUS COMORBIDITY AND BODY MASS INDEX (BMI) OF 50.0 TO 59.9 IN ADULT, UNSPECIFIED OBESITY TYPE (HCC): ICD-10-CM

## 2020-06-16 DIAGNOSIS — F41.9 ANXIETY: ICD-10-CM

## 2020-06-16 DIAGNOSIS — J45.20 MILD INTERMITTENT ASTHMA WITHOUT COMPLICATION: ICD-10-CM

## 2020-06-16 PROBLEM — E66.813 CLASS 3 SEVERE OBESITY WITH SERIOUS COMORBIDITY AND BODY MASS INDEX (BMI) OF 50.0 TO 59.9 IN ADULT (HCC): Status: ACTIVE | Noted: 2020-06-16

## 2020-06-16 PROCEDURE — 1036F TOBACCO NON-USER: CPT | Performed by: NURSE PRACTITIONER

## 2020-06-16 PROCEDURE — 4040F PNEUMOC VAC/ADMIN/RCVD: CPT | Performed by: NURSE PRACTITIONER

## 2020-06-16 PROCEDURE — 1160F RVW MEDS BY RX/DR IN RCRD: CPT | Performed by: NURSE PRACTITIONER

## 2020-06-16 PROCEDURE — 99214 OFFICE O/P EST MOD 30 MIN: CPT | Performed by: NURSE PRACTITIONER

## 2020-06-16 PROCEDURE — 3078F DIAST BP <80 MM HG: CPT | Performed by: NURSE PRACTITIONER

## 2020-06-16 PROCEDURE — 3074F SYST BP LT 130 MM HG: CPT | Performed by: NURSE PRACTITIONER

## 2020-06-16 PROCEDURE — 3008F BODY MASS INDEX DOCD: CPT | Performed by: NURSE PRACTITIONER

## 2020-06-16 RX ORDER — ALBUTEROL SULFATE 2.5 MG/3ML
2.5 SOLUTION RESPIRATORY (INHALATION) EVERY 6 HOURS PRN
Qty: 360 ML | Refills: 1 | Status: SHIPPED | OUTPATIENT
Start: 2020-06-16 | End: 2022-04-04 | Stop reason: SDUPTHER

## 2020-06-16 RX ORDER — ALBUTEROL SULFATE 90 UG/1
2 AEROSOL, METERED RESPIRATORY (INHALATION) EVERY 6 HOURS PRN
Qty: 1 INHALER | Refills: 3 | Status: SHIPPED | OUTPATIENT
Start: 2020-06-16

## 2020-06-16 RX ORDER — BUSPIRONE HYDROCHLORIDE 5 MG/1
5 TABLET ORAL 3 TIMES DAILY
Qty: 30 TABLET | Refills: 0 | Status: SHIPPED | OUTPATIENT
Start: 2020-06-16 | End: 2020-07-27 | Stop reason: ALTCHOICE

## 2020-06-22 ENCOUNTER — TELEPHONE (OUTPATIENT)
Dept: CARDIOLOGY CLINIC | Facility: CLINIC | Age: 80
End: 2020-06-22

## 2020-06-22 DIAGNOSIS — I10 ESSENTIAL HYPERTENSION: Primary | ICD-10-CM

## 2020-06-22 RX ORDER — OLMESARTAN MEDOXOMIL 5 MG/1
5 TABLET ORAL DAILY
Qty: 90 TABLET | Refills: 1 | Status: SHIPPED | OUTPATIENT
Start: 2020-06-22 | End: 2020-12-17

## 2020-06-30 ENCOUNTER — OFFICE VISIT (OUTPATIENT)
Dept: FAMILY MEDICINE CLINIC | Facility: CLINIC | Age: 80
End: 2020-06-30
Payer: MEDICARE

## 2020-06-30 VITALS
SYSTOLIC BLOOD PRESSURE: 122 MMHG | OXYGEN SATURATION: 91 % | TEMPERATURE: 97.5 F | BODY MASS INDEX: 51.7 KG/M2 | DIASTOLIC BLOOD PRESSURE: 72 MMHG | HEIGHT: 55 IN | HEART RATE: 65 BPM | WEIGHT: 223.4 LBS

## 2020-06-30 DIAGNOSIS — J98.4 ACUTE PULMONARY INSUFFICIENCY: Primary | ICD-10-CM

## 2020-06-30 DIAGNOSIS — E78.5 HYPERLIPIDEMIA, UNSPECIFIED HYPERLIPIDEMIA TYPE: ICD-10-CM

## 2020-06-30 DIAGNOSIS — I10 ESSENTIAL HYPERTENSION: ICD-10-CM

## 2020-06-30 DIAGNOSIS — R53.83 FATIGUE, UNSPECIFIED TYPE: ICD-10-CM

## 2020-06-30 PROCEDURE — 4040F PNEUMOC VAC/ADMIN/RCVD: CPT | Performed by: NURSE PRACTITIONER

## 2020-06-30 PROCEDURE — 3078F DIAST BP <80 MM HG: CPT | Performed by: NURSE PRACTITIONER

## 2020-06-30 PROCEDURE — 3074F SYST BP LT 130 MM HG: CPT | Performed by: NURSE PRACTITIONER

## 2020-06-30 PROCEDURE — 1160F RVW MEDS BY RX/DR IN RCRD: CPT | Performed by: NURSE PRACTITIONER

## 2020-06-30 PROCEDURE — 3008F BODY MASS INDEX DOCD: CPT | Performed by: NURSE PRACTITIONER

## 2020-06-30 PROCEDURE — 99213 OFFICE O/P EST LOW 20 MIN: CPT | Performed by: NURSE PRACTITIONER

## 2020-06-30 PROCEDURE — 1036F TOBACCO NON-USER: CPT | Performed by: NURSE PRACTITIONER

## 2020-07-01 ENCOUNTER — APPOINTMENT (OUTPATIENT)
Dept: LAB | Facility: HOSPITAL | Age: 80
End: 2020-07-01
Payer: MEDICARE

## 2020-07-01 DIAGNOSIS — E78.5 HYPERLIPIDEMIA, UNSPECIFIED HYPERLIPIDEMIA TYPE: ICD-10-CM

## 2020-07-01 DIAGNOSIS — J98.4 ACUTE PULMONARY INSUFFICIENCY: ICD-10-CM

## 2020-07-01 DIAGNOSIS — R53.83 FATIGUE, UNSPECIFIED TYPE: ICD-10-CM

## 2020-07-01 DIAGNOSIS — I10 ESSENTIAL HYPERTENSION: ICD-10-CM

## 2020-07-01 LAB
ALBUMIN SERPL BCP-MCNC: 3.6 G/DL (ref 3.5–5)
ALP SERPL-CCNC: 84 U/L (ref 46–116)
ALT SERPL W P-5'-P-CCNC: 27 U/L (ref 12–78)
ANION GAP SERPL CALCULATED.3IONS-SCNC: 9 MMOL/L (ref 4–13)
AST SERPL W P-5'-P-CCNC: 26 U/L (ref 5–45)
BASOPHILS # BLD AUTO: 0.06 THOUSANDS/ΜL (ref 0–0.1)
BASOPHILS NFR BLD AUTO: 1 % (ref 0–1)
BILIRUB SERPL-MCNC: 0.5 MG/DL (ref 0.2–1)
BUN SERPL-MCNC: 18 MG/DL (ref 5–25)
CALCIUM SERPL-MCNC: 9.7 MG/DL (ref 8.3–10.1)
CHLORIDE SERPL-SCNC: 104 MMOL/L (ref 100–108)
CHOLEST SERPL-MCNC: 143 MG/DL (ref 50–200)
CO2 SERPL-SCNC: 29 MMOL/L (ref 21–32)
CREAT SERPL-MCNC: 0.83 MG/DL (ref 0.6–1.3)
CREAT UR-MCNC: 34.6 MG/DL
EOSINOPHIL # BLD AUTO: 0.58 THOUSAND/ΜL (ref 0–0.61)
EOSINOPHIL NFR BLD AUTO: 7 % (ref 0–6)
ERYTHROCYTE [DISTWIDTH] IN BLOOD BY AUTOMATED COUNT: 15.2 % (ref 11.6–15.1)
GFR SERPL CREATININE-BSD FRML MDRD: 67 ML/MIN/1.73SQ M
GLUCOSE P FAST SERPL-MCNC: 96 MG/DL (ref 65–99)
HCT VFR BLD AUTO: 44.9 % (ref 34.8–46.1)
HDLC SERPL-MCNC: 63 MG/DL
HGB BLD-MCNC: 13.6 G/DL (ref 11.5–15.4)
IMM GRANULOCYTES # BLD AUTO: 0.05 THOUSAND/UL (ref 0–0.2)
IMM GRANULOCYTES NFR BLD AUTO: 1 % (ref 0–2)
LDLC SERPL CALC-MCNC: 63 MG/DL (ref 0–100)
LYMPHOCYTES # BLD AUTO: 2.04 THOUSANDS/ΜL (ref 0.6–4.47)
LYMPHOCYTES NFR BLD AUTO: 24 % (ref 14–44)
MCH RBC QN AUTO: 27.4 PG (ref 26.8–34.3)
MCHC RBC AUTO-ENTMCNC: 30.3 G/DL (ref 31.4–37.4)
MCV RBC AUTO: 91 FL (ref 82–98)
MICROALBUMIN UR-MCNC: 10 MG/L (ref 0–20)
MICROALBUMIN/CREAT 24H UR: 29 MG/G CREATININE (ref 0–30)
MONOCYTES # BLD AUTO: 0.82 THOUSAND/ΜL (ref 0.17–1.22)
MONOCYTES NFR BLD AUTO: 10 % (ref 4–12)
NEUTROPHILS # BLD AUTO: 4.95 THOUSANDS/ΜL (ref 1.85–7.62)
NEUTS SEG NFR BLD AUTO: 57 % (ref 43–75)
NONHDLC SERPL-MCNC: 80 MG/DL
NRBC BLD AUTO-RTO: 0 /100 WBCS
NT-PROBNP SERPL-MCNC: 185 PG/ML
PLATELET # BLD AUTO: 356 THOUSANDS/UL (ref 149–390)
PMV BLD AUTO: 10 FL (ref 8.9–12.7)
POTASSIUM SERPL-SCNC: 4.4 MMOL/L (ref 3.5–5.3)
PROT SERPL-MCNC: 7.8 G/DL (ref 6.4–8.2)
RBC # BLD AUTO: 4.96 MILLION/UL (ref 3.81–5.12)
SODIUM SERPL-SCNC: 142 MMOL/L (ref 136–145)
TRIGL SERPL-MCNC: 87 MG/DL
WBC # BLD AUTO: 8.5 THOUSAND/UL (ref 4.31–10.16)

## 2020-07-01 PROCEDURE — 83880 ASSAY OF NATRIURETIC PEPTIDE: CPT

## 2020-07-01 PROCEDURE — 80061 LIPID PANEL: CPT

## 2020-07-01 PROCEDURE — 80053 COMPREHEN METABOLIC PANEL: CPT

## 2020-07-01 PROCEDURE — 36415 COLL VENOUS BLD VENIPUNCTURE: CPT

## 2020-07-01 PROCEDURE — 82570 ASSAY OF URINE CREATININE: CPT | Performed by: NURSE PRACTITIONER

## 2020-07-01 PROCEDURE — 82043 UR ALBUMIN QUANTITATIVE: CPT | Performed by: NURSE PRACTITIONER

## 2020-07-01 PROCEDURE — 85025 COMPLETE CBC W/AUTO DIFF WBC: CPT

## 2020-07-07 ENCOUNTER — TELEPHONE (OUTPATIENT)
Dept: BARIATRICS | Facility: CLINIC | Age: 80
End: 2020-07-07

## 2020-07-07 ENCOUNTER — TELEPHONE (OUTPATIENT)
Dept: FAMILY MEDICINE CLINIC | Facility: CLINIC | Age: 80
End: 2020-07-07

## 2020-07-07 NOTE — TELEPHONE ENCOUNTER
1  Do you presently have a fever or flu-like symptoms? NO:[id number]}  2  Do you have symptoms of an upper respiratory infection like runny nose, sore throat, or cough? NO:41963}  3  Are you suffering from new headache that you have not had in the past?  NO:[id number]}  4  Do you have/have you experienced any new shortness of breath recently? NO:23658}  5  Do you have any new diarrhea, nausea or vomiting? NO:14651}  6  Have you been in contact with anyone who has been sick or diagnosed with COVID-19?  HN:[id number]}

## 2020-07-08 ENCOUNTER — CONSULT (OUTPATIENT)
Dept: BARIATRICS | Facility: CLINIC | Age: 80
End: 2020-07-08
Payer: MEDICARE

## 2020-07-08 VITALS
SYSTOLIC BLOOD PRESSURE: 128 MMHG | HEART RATE: 60 BPM | HEIGHT: 55 IN | WEIGHT: 227 LBS | TEMPERATURE: 98.9 F | DIASTOLIC BLOOD PRESSURE: 89 MMHG | BODY MASS INDEX: 52.54 KG/M2

## 2020-07-08 DIAGNOSIS — E78.5 HYPERLIPIDEMIA: Primary | Chronic | ICD-10-CM

## 2020-07-08 DIAGNOSIS — E03.9 HYPOTHYROID: Chronic | ICD-10-CM

## 2020-07-08 DIAGNOSIS — J44.9 COPD, MILD (HCC): Chronic | ICD-10-CM

## 2020-07-08 DIAGNOSIS — G47.33 OSA (OBSTRUCTIVE SLEEP APNEA): ICD-10-CM

## 2020-07-08 DIAGNOSIS — E66.01 CLASS 3 SEVERE OBESITY WITH SERIOUS COMORBIDITY AND BODY MASS INDEX (BMI) OF 50.0 TO 59.9 IN ADULT, UNSPECIFIED OBESITY TYPE (HCC): ICD-10-CM

## 2020-07-08 DIAGNOSIS — K21.9 GASTROESOPHAGEAL REFLUX DISEASE WITHOUT ESOPHAGITIS: Chronic | ICD-10-CM

## 2020-07-08 DIAGNOSIS — F41.8 DEPRESSION WITH ANXIETY: ICD-10-CM

## 2020-07-08 DIAGNOSIS — I50.32 CHRONIC DIASTOLIC CONGESTIVE HEART FAILURE (HCC): Chronic | ICD-10-CM

## 2020-07-08 PROCEDURE — 99214 OFFICE O/P EST MOD 30 MIN: CPT | Performed by: PHYSICIAN ASSISTANT

## 2020-07-08 NOTE — PROGRESS NOTES
Assessment/Plan:    Class 3 severe obesity with serious comorbidity and body mass index (BMI) of 50 0 to 59 9 in adult [facility])  -Discussed options of [facility]-Intensive Lifestyle Intervention Program, Conservative Program, Vera-En-Y Gastric Bypass and Vertical Sleeve Gastrectomy and the role of weight loss medications   -Initial weight loss goal of 5-10% weight loss for improved health  -Screening labs   Recommend checking lab coverage before having labs drawn   -lipid and cmp reviewed from 7/1/20 all within acceptable limits  -Patient is not interested in surgery  -patient is not interested in any mwm programs         Hyperlipidemia  Taking zocor  -should improve with weight loss, dietary, and lifestyle changes  -continue management with prescribing provider      Depression with anxiety  Taking zoloft, buspar   -continue management with prescribing provider      Chronic diastolic congestive heart failure ([facility] )  Wt Readings from Last 3 Encounters:   06/30/20 101 kg (223 lb 6 4 oz)   06/16/20 102 kg (225 lb)   06/09/20 102 kg (225 lb)     HF,HTN, pulm HTN  Taking aspirin, lasix,toprol, benicar  -continue management with prescribing provider          JANICE (obstructive sleep apnea)  -encouraged continued use of CPAP machine  -may improve with 20-30% weight loss      COPD, mild (HCC)  Taking breo ellipta and albuterol  -continue management with prescribing provider      Hypothyroid  Taking levothyroxine  -continue management with prescribing provider      Gastroesophageal reflux disease without esophagitis  Taking prilosec  -should improve with weight loss, dietary, and lifestyle changes  -continue management with prescribing provider        Diagnoses and all orders for this visit:    Hyperlipidemia    Class 3 severe obesity with serious comorbidity and body mass index (BMI) of 50 0 to 59 9 in adult, unspecified obesity type ([facility] [address] )  -     Ambulatory referral to Weight Management    Depression with anxiety    Chronic diastolic congestive heart failure (HCC)    Gastroesophageal reflux disease without esophagitis    Hypothyroid    COPD, mild (HCC)    JANICE (obstructive sleep apnea)          Subjective:   Chief Complaint   Patient presents with    Consult     Patient is her for initial MWM consult with PA  Patient has JANICE and uses CPAP  BMI 53 7  Patient ID: Zheng Grady  is a [de-identified] y o  female with excess weight/obesity here to pursue weight management  Past Medical History:   Diagnosis Date    Anemia 08/22/2018    Anxiety     Arthritis     AVB (atrioventricular block)     first degree    Cataract     CHF (congestive heart failure) (HCC)     COPD, mild (HCC)     Coronary artery disease     Dislocation of right shoulder joint     Frequent UTI     GERD (gastroesophageal reflux disease)     H/O: pneumonia     Heme positive stool     Hyperlipidemia     Hypertension     Hypothyroidism     Morbid obesity with BMI of 50 0-59 9, adult ([facility] [address] )     Obesity, morbid ([facility] ) 08/22/2018    JANICE on CPAP     Pulmonary hypertension ([facility] ) 08/22/2018    Severe aortic stenosis     Simple goiter     Skin cyst     within the armpits, right    Wears glasses        HPI:  Obesity/Excess Weight:  Severity: Severe  Onset:  Since her 51's    Modifiers: Diet and Exercise  Contributing factors: Poor Food Choices  Associated symptoms: fatigue and increased shortness of breath    Goals: lose 100 lbs   Hydration: 2 bottles of water, 1 cup of coffee with creamer, 3 cups of 1% of milk per week, 3 cups of soda per week   Alcohol: none     The following portions of the patient's history were reviewed and updated as appropriate: allergies, current medications, past family history, past medical history, past social history, past surgical history and problem list     Review of Systems   HENT: Negative for sore throat  Respiratory: Positive for shortness of breath (on oxygen )  Negative for cough      Cardiovascular: Negative for chest pain and palpitations  Gastrointestinal: Negative for abdominal pain, constipation, diarrhea, nausea and vomiting         + GERD-controlled with meds    Endocrine: Negative for cold intolerance and heat intolerance  Genitourinary: Negative for dysuria  Musculoskeletal: Positive for back pain  Negative for arthralgias  Skin: Negative for rash  Neurological: Negative for headaches  Psychiatric/Behavioral: Negative for suicidal ideas (denies HI)  + Depression and anxiety--controlled        Objective:    /89 (BP Location: Left arm, Patient Position: Sitting, Cuff Size: Large)   Pulse 60   Temp 98 9 °F (37 2 °C) (Tympanic)   Ht 4' 6 5" (1 384 m)   Wt 103 kg (227 lb)   BMI 53 73 kg/m²     Physical Exam   Nursing note and vitals reviewed  Constitutional   General appearance: Abnormal   well developed and morbidly obese  Eyes No conjunctival pallor  Pulmonary   Respiratory effort: No increased work of breathing or signs of respiratory distress  Auscultation of lungs: Clear to auscultation, equal breath sounds bilaterally, no wheezes, no rales, no rhonci  Cardiovascular   Auscultation of heart: Normal rate and rhythm, normal S1 and S2, without murmurs  Examination of extremities for edema and/or varicosities: Normal   no edema  Abdomen   Abdomen: Abnormal   The abdomen was obese  Bowel sounds were normal  The abdomen was soft and nontender     Musculoskeletal   Gait and station: Normal     Psychiatric   Orientation to person, place and time: Normal     Affect: appropriate

## 2020-07-08 NOTE — ASSESSMENT & PLAN NOTE
Taking prilosec  -should improve with weight loss, dietary, and lifestyle changes  -continue management with prescribing provider

## 2020-07-08 NOTE — ASSESSMENT & PLAN NOTE
Wt Readings from Last 3 Encounters:   06/30/20 101 kg (223 lb 6 4 oz)   06/16/20 102 kg (225 lb)   06/09/20 102 kg (225 lb)     HF,HTN, pulm HTN  Taking aspirin, lasix,toprol, benicar  -continue management with prescribing provider

## 2020-07-08 NOTE — ASSESSMENT & PLAN NOTE
-Discussed options of [facility]-Intensive Lifestyle Intervention Program, Conservative Program, Vera-En-Y Gastric Bypass and Vertical Sleeve Gastrectomy and the role of weight loss medications   -Initial weight loss goal of 5-10% weight loss for improved health  -Screening labs   Recommend checking lab coverage before having labs drawn   -lipid and cmp reviewed from 7/1/20 all within acceptable limits  -Patient is not interested in surgery  -patient is not interested in any mwm programs

## 2020-07-13 DIAGNOSIS — K21.00 GASTROESOPHAGEAL REFLUX DISEASE WITH ESOPHAGITIS: ICD-10-CM

## 2020-07-13 RX ORDER — OMEPRAZOLE 40 MG/1
40 CAPSULE, DELAYED RELEASE ORAL 2 TIMES DAILY
Qty: 180 CAPSULE | Refills: 1 | Status: SHIPPED | OUTPATIENT
Start: 2020-07-13 | End: 2020-11-10

## 2020-07-27 ENCOUNTER — OFFICE VISIT (OUTPATIENT)
Dept: FAMILY MEDICINE CLINIC | Facility: CLINIC | Age: 80
End: 2020-07-27
Payer: MEDICARE

## 2020-07-27 VITALS
OXYGEN SATURATION: 93 % | TEMPERATURE: 99.2 F | DIASTOLIC BLOOD PRESSURE: 82 MMHG | HEART RATE: 64 BPM | HEIGHT: 55 IN | RESPIRATION RATE: 18 BRPM | WEIGHT: 223.6 LBS | BODY MASS INDEX: 51.74 KG/M2 | SYSTOLIC BLOOD PRESSURE: 124 MMHG

## 2020-07-27 DIAGNOSIS — F41.8 DEPRESSION WITH ANXIETY: ICD-10-CM

## 2020-07-27 DIAGNOSIS — I27.20 PULMONARY HYPERTENSION (HCC): Chronic | ICD-10-CM

## 2020-07-27 DIAGNOSIS — G47.00 INSOMNIA, UNSPECIFIED TYPE: ICD-10-CM

## 2020-07-27 DIAGNOSIS — I10 ESSENTIAL HYPERTENSION: Chronic | ICD-10-CM

## 2020-07-27 DIAGNOSIS — Z78.0 ASYMPTOMATIC POSTMENOPAUSAL STATE: ICD-10-CM

## 2020-07-27 DIAGNOSIS — Z00.00 MEDICARE ANNUAL WELLNESS VISIT, SUBSEQUENT: Primary | ICD-10-CM

## 2020-07-27 DIAGNOSIS — E66.01 OBESITY, MORBID (HCC): Chronic | ICD-10-CM

## 2020-07-27 PROBLEM — R06.02 SHORTNESS OF BREATH: Status: RESOLVED | Noted: 2018-08-14 | Resolved: 2020-07-27

## 2020-07-27 PROCEDURE — 4040F PNEUMOC VAC/ADMIN/RCVD: CPT | Performed by: FAMILY MEDICINE

## 2020-07-27 PROCEDURE — 1160F RVW MEDS BY RX/DR IN RCRD: CPT | Performed by: FAMILY MEDICINE

## 2020-07-27 PROCEDURE — G0439 PPPS, SUBSEQ VISIT: HCPCS | Performed by: FAMILY MEDICINE

## 2020-07-27 PROCEDURE — 3008F BODY MASS INDEX DOCD: CPT | Performed by: FAMILY MEDICINE

## 2020-07-27 PROCEDURE — 99214 OFFICE O/P EST MOD 30 MIN: CPT | Performed by: FAMILY MEDICINE

## 2020-07-27 PROCEDURE — 1125F AMNT PAIN NOTED PAIN PRSNT: CPT | Performed by: FAMILY MEDICINE

## 2020-07-27 PROCEDURE — 3079F DIAST BP 80-89 MM HG: CPT | Performed by: FAMILY MEDICINE

## 2020-07-27 PROCEDURE — 1123F ACP DISCUSS/DSCN MKR DOCD: CPT | Performed by: FAMILY MEDICINE

## 2020-07-27 PROCEDURE — 3074F SYST BP LT 130 MM HG: CPT | Performed by: FAMILY MEDICINE

## 2020-07-27 PROCEDURE — 1036F TOBACCO NON-USER: CPT | Performed by: FAMILY MEDICINE

## 2020-07-27 PROCEDURE — 1170F FXNL STATUS ASSESSED: CPT | Performed by: FAMILY MEDICINE

## 2020-07-27 RX ORDER — TEMAZEPAM 7.5 MG/1
7.5 CAPSULE ORAL
Qty: 90 CAPSULE | Refills: 0 | Status: SHIPPED | OUTPATIENT
Start: 2020-07-27 | End: 2020-10-22 | Stop reason: ALTCHOICE

## 2020-07-27 NOTE — PATIENT INSTRUCTIONS
Discussed all with patient  Reviewed labs  Blood pressure is perfect  Continue on your current 3 L oxygen when wearing a mask in 2 L when your home  Instructed on the use of the spacer  If you have any problem with the spacer come in here with your inhaler and I will show you had to do it but read the instructions very self-explanatory  Take 2 puffs of the inhaler prior to walking your dog  Start with 1 puff hold it for 9 seconds let it out and repeated again in a minute  I think this will help you open up your lungs and help your breathing  Keep the appointment with your cardiologist and the pulmonologist   If the ear is bothering you more you need to make an appointment with Dr Yesica Rosario again  Follow-up here in 3 months  Schedule the dexa scan and let me know if you change your mind about the shingles shot  Medicare Preventive Visit Patient Instructions  Thank you for completing your Welcome to Medicare Visit or Medicare Annual Wellness Visit today  Your next wellness visit will be due in one year (7/27/2021)  The screening/preventive services that you may require over the next 5-10 years are detailed below  Some tests may not apply to you based off risk factors and/or age  Screening tests ordered at today's visit but not completed yet may show as past due  Also, please note that scanned in results may not display below  Preventive Screenings:  Service Recommendations Previous Testing/Comments   Colorectal Cancer Screening  * Colonoscopy    * Fecal Occult Blood Test (FOBT)/Fecal Immunochemical Test (FIT)  * Fecal DNA/Cologuard Test  * Flexible Sigmoidoscopy Age: 54-65 years old   Colonoscopy: every 10 years (may be performed more frequently if at higher risk)  OR  FOBT/FIT: every 1 year  OR  Cologuard: every 3 years  OR  Sigmoidoscopy: every 5 years  Screening may be recommended earlier than age 48 if at higher risk for colorectal cancer   Also, an individualized decision between you and your healthcare provider will decide whether screening between the ages of 74-80 would be appropriate  Colonoscopy: Not on file  FOBT/FIT: 11/08/2018  Cologuard: Not on file  Sigmoidoscopy: Not on file         Breast Cancer Screening Age: 36 years old  Frequency: every 1-2 years  Not required if history of left and right mastectomy Mammogram: Not on file       Cervical Cancer Screening Between the ages of 21-29, pap smear recommended once every 3 years  Between the ages of 33-67, can perform pap smear with HPV co-testing every 5 years  Recommendations may differ for women with a history of total hysterectomy, cervical cancer, or abnormal pap smears in past  Pap Smear: Not on file    Screening Not Indicated   Hepatitis C Screening Once for adults born between 1945 and 1965  More frequently in patients at high risk for Hepatitis C Hep C Antibody: Not on file       Diabetes Screening 1-2 times per year if you're at risk for diabetes or have pre-diabetes Fasting glucose: 96 mg/dL   A1C: 5 7 %    Screening Current   Cholesterol Screening Once every 5 years if you don't have a lipid disorder  May order more often based on risk factors  Lipid panel: 07/01/2020    Screening Not Indicated  History Lipid Disorder     Other Preventive Screenings Covered by Medicare:  1  Abdominal Aortic Aneurysm (AAA) Screening: covered once if your at risk  You're considered to be at risk if you have a family history of AAA  2  Lung Cancer Screening: covers low dose CT scan once per year if you meet all of the following conditions: (1) Age 50-69; (2) No signs or symptoms of lung cancer; (3) Current smoker or have quit smoking within the last 15 years; (4) You have a tobacco smoking history of at least 30 pack years (packs per day multiplied by number of years you smoked); (5) You get a written order from a healthcare provider    3  Glaucoma Screening: covered annually if you're considered high risk: (1) You have diabetes OR (2) Family history of glaucoma OR (3)  aged 48 and older OR (3)  American aged 72 and older  3  Osteoporosis Screening: covered every 2 years if you meet one of the following conditions: (1) You're estrogen deficient and at risk for osteoporosis based off medical history and other findings; (2) Have a vertebral abnormality; (3) On glucocorticoid therapy for more than 3 months; (4) Have primary hyperparathyroidism; (5) On osteoporosis medications and need to assess response to drug therapy  · Last bone density test (DXA Scan): Not on file  5  HIV Screening: covered annually if you're between the age of 12-76  Also covered annually if you are younger than 13 and older than 72 with risk factors for HIV infection  For pregnant patients, it is covered up to 3 times per pregnancy  Immunizations:  Immunization Recommendations   Influenza Vaccine Annual influenza vaccination during flu season is recommended for all persons aged >= 6 months who do not have contraindications   Pneumococcal Vaccine (Prevnar and Pneumovax)  * Prevnar = PCV13  * Pneumovax = PPSV23   Adults 25-60 years old: 1-3 doses may be recommended based on certain risk factors  Adults 72 years old: Prevnar (PCV13) vaccine recommended followed by Pneumovax (PPSV23) vaccine  If already received PPSV23 since turning 65, then PCV13 recommended at least one year after PPSV23 dose  Hepatitis B Vaccine 3 dose series if at intermediate or high risk (ex: diabetes, end stage renal disease, liver disease)   Tetanus (Td) Vaccine - COST NOT COVERED BY MEDICARE PART B Following completion of primary series, a booster dose should be given every 10 years to maintain immunity against tetanus  Td may also be given as tetanus wound prophylaxis  Tdap Vaccine - COST NOT COVERED BY MEDICARE PART B Recommended at least once for all adults  For pregnant patients, recommended with each pregnancy     Shingles Vaccine (Shingrix) - COST NOT COVERED BY MEDICARE PART B  2 shot series recommended in those aged 48 and above     Health Maintenance Due:  There are no preventive care reminders to display for this patient  Immunizations Due:      Topic Date Due    Influenza Vaccine  07/01/2020     Advance Directives   What are advance directives? Advance directives are legal documents that state your wishes and plans for medical care  These plans are made ahead of time in case you lose your ability to make decisions for yourself  Advance directives can apply to any medical decision, such as the treatments you want, and if you want to donate organs  What are the types of advance directives? There are many types of advance directives, and each state has rules about how to use them  You may choose a combination of any of the following:  · Living will: This is a written record of the treatment you want  You can also choose which treatments you do not want, which to limit, and which to stop at a certain time  This includes surgery, medicine, IV fluid, and tube feedings  · Durable power of  for healthcare [facility]): This is a written record that states who you want to make healthcare choices for you when you are unable to make them for yourself  This person, called a proxy, is usually a family member or a friend  You may choose more than 1 proxy  · Do not resuscitate (DNR) order:  A DNR order is used in case your heart stops beating or you stop breathing  It is a request not to have certain forms of treatment, such as CPR  A DNR order may be included in other types of advance directives  · Medical directive: This covers the care that you want if you are in a coma, near death, or unable to make decisions for yourself  You can list the treatments you want for each condition  Treatment may include pain medicine, surgery, blood transfusions, dialysis, IV or tube feedings, and a ventilator (breathing machine)  · Values history:   This document has questions about your views, beliefs, and how you feel and think about life  This information can help others choose the care that you would choose  Why are advance directives important? An advance directive helps you control your care  Although spoken wishes may be used, it is better to have your wishes written down  Spoken wishes can be misunderstood, or not followed  Treatments may be given even if you do not want them  An advance directive may make it easier for your family to make difficult choices about your care  Fall Prevention    Fall prevention  includes ways to make your home and other areas safer  It also includes ways you can move more carefully to prevent a fall  Health conditions that cause changes in your blood pressure, vision, or muscle strength and coordination may increase your risk for falls  Medicines may also increase your risk for falls if they make you dizzy, weak, or sleepy  Fall prevention tips:   · Stand or sit up slowly  · Use assistive devices as directed  · Wear shoes that fit well and have soles that   · Wear a personal alarm  · Stay active  · Manage your medical conditions  Home Safety Tips:  · Add items to prevent falls in the bathroom  · Keep paths clear  · Install bright lights in your home  · Keep items you use often on shelves within reach  · Paint or place reflective tape on the edges of your stairs  Urinary Incontinence   Urinary incontinence (UI)  is when you lose control of your bladder  UI develops because your bladder cannot store or empty urine properly  The 3 most common types of UI are stress incontinence, urge incontinence, or both  Medicines:   · May be given to help strengthen your bladder control  Report any side effects of medication to your healthcare provider  Do pelvic muscle exercises often:  Your pelvic muscles help you stop urinating  Squeeze these muscles tight for 5 seconds, then relax for 5 seconds   Gradually work up to squeezing for 10 seconds  Do 3 sets of 15 repetitions a day, or as directed  This will help strengthen your pelvic muscles and improve bladder control  Train your bladder:  Go to the bathroom at set times, such as every 2 hours, even if you do not feel the urge to go  You can also try to hold your urine when you feel the urge to go  For example, hold your urine for 5 minutes when you feel the urge to go  As that becomes easier, hold your urine for 10 minutes  Self-care:   · Keep a UI record  Write down how often you leak urine and how much you leak  Make a note of what you were doing when you leaked urine  · Drink liquids as directed  You may need to limit the amount of liquid you drink to help control your urine leakage  Do not drink any liquid right before you go to bed  Limit or do not have drinks that contain caffeine or alcohol  · Prevent constipation  Eat a variety of high-fiber foods  Good examples are high-fiber cereals, beans, vegetables, and whole-grain breads  Walking is the best way to trigger your intestines to have a bowel movement  · Exercise regularly and maintain a healthy weight  Weight loss and exercise will decrease pressure on your bladder and help you control your leakage  · Use a catheter as directed  to help empty your bladder  A catheter is a tiny, plastic tube that is put into your bladder to drain your urine  · Go to behavior therapy as directed  Behavior therapy may be used to help you learn to control your urge to urinate  Weight Management   Why it is important to manage your weight:  Being overweight increases your risk of health conditions such as heart disease, high blood pressure, type 2 diabetes, and certain types of cancer  It can also increase your risk for osteoarthritis, sleep apnea, and other respiratory problems  Aim for a slow, steady weight loss  Even a small amount of weight loss can lower your risk of health problems    How to lose weight safely:  A safe and healthy way to lose weight is to eat fewer calories and get regular exercise  You can lose up about 1 pound a week by decreasing the number of calories you eat by 500 calories each day  Healthy meal plan for weight management:  A healthy meal plan includes a variety of foods, contains fewer calories, and helps you stay healthy  A healthy meal plan includes the following:  · Eat whole-grain foods more often  A healthy meal plan should contain fiber  Fiber is the part of grains, fruits, and vegetables that is not broken down by your body  Whole-grain foods are healthy and provide extra fiber in your diet  Some examples of whole-grain foods are whole-wheat breads and pastas, oatmeal, brown rice, and bulgur  · Eat a variety of vegetables every day  Include dark, leafy greens such as spinach, kale, marlo greens, and mustard greens  Eat yellow and orange vegetables such as carrots, sweet potatoes, and winter squash  · Eat a variety of fruits every day  Choose fresh or canned fruit (canned in its own juice or light syrup) instead of juice  Fruit juice has very little or no fiber  · Eat low-fat dairy foods  Drink fat-free (skim) milk or 1% milk  Eat fat-free yogurt and low-fat cottage cheese  Try low-fat cheeses such as mozzarella and other reduced-fat cheeses  · Choose meat and other protein foods that are low in fat  Choose beans or other legumes such as split peas or lentils  Choose fish, skinless poultry (chicken or turkey), or lean cuts of red meat (beef or pork)  Before you cook meat or poultry, cut off any visible fat  · Use less fat and oil  Try baking foods instead of frying them  Add less fat, such as margarine, sour cream, regular salad dressing and mayonnaise to foods  Eat fewer high-fat foods  Some examples of high-fat foods include french fries, doughnuts, ice cream, and cakes  · Eat fewer sweets  Limit foods and drinks that are high in sugar   This includes candy, cookies, regular soda, and sweetened drinks  Exercise:  Exercise at least 30 minutes per day on most days of the week  Some examples of exercise include walking, biking, dancing, and swimming  You can also fit in more physical activity by taking the stairs instead of the elevator or parking farther away from stores  Ask your healthcare provider about the best exercise plan for you  © Copyright [employer] 2018 Information is for End User's use only and may not be sold, redistributed or otherwise used for commercial purposes   All illustrations and images included in CareNotes® are the copyrighted property of [employer]  or [address] [place] [address] [employer]

## 2020-07-27 NOTE — ASSESSMENT & PLAN NOTE
Pt was given a spacer and instructed on the use of the inhaler  Try 2 puffs one minute apart prior to walking or any activity every 4 to 6 hours

## 2020-07-27 NOTE — ASSESSMENT & PLAN NOTE
Feels she is doing better s/p getting a dog, but I have concerns for safety  Caution walking the dog

## 2020-07-27 NOTE — PROGRESS NOTES
Assessment and Plan:     Problem List Items Addressed This Visit     None      Visit Diagnoses     Medicare annual wellness visit, subsequent    -  Primary    Asymptomatic postmenopausal state        Relevant Orders    DXA bone density spine hip and pelvis           Preventive health issues were discussed with patient, and age appropriate screening tests were ordered as noted in patient's After Visit Summary  Personalized health advice and appropriate referrals for health education or preventive services given if needed, as noted in patient's After Visit Summary       History of Present Illness:     Patient presents for Medicare Annual Wellness visit    Patient Care Team:  Cory Anton as PCP - General (Family Medicine)  Vince Millan MD as Endoscopist     Problem List:     Patient Active Problem List   Diagnosis    Hypothyroid    Hypertension    Hyperlipidemia    Reactive airway disease without complication    JANICE (obstructive sleep apnea)    LVH (left ventricular hypertrophy)    Mitral annular calcification    Mitral valve stenosis    Pulmonary hypertension (HCC)    Shortness of breath    Chronic diastolic (congestive) heart failure (HCC)    Anemia    Obesity, morbid (HCC)    Gastroesophageal reflux disease without esophagitis    Arthritis    AVB (atrioventricular block)    Chronic diastolic congestive heart failure (HCC)    COPD, mild (HCC)    Coronary artery disease    JANICE on CPAP    S/P TAVR (transcatheter aortic valve replacement)    Acute pulmonary insufficiency    Postoperative anemia due to acute blood loss    Encounter for postoperative care    Depression with anxiety    Chronic otitis media    Insomnia    SOB (shortness of breath)    Anxiety    Class 3 severe obesity with serious comorbidity and body mass index (BMI) of 50 0 to 59 9 in adult [facility])    Fatigue      Past Medical and Surgical History:     Past Medical History:   Diagnosis Date    Anemia 08/22/2018    Anxiety     Arthritis     AVB (atrioventricular block)     first degree    Cataract     CHF (congestive heart failure) (HCC)     COPD, mild (HCC)     Coronary artery disease     Dislocation of right shoulder joint     Frequent UTI     GERD (gastroesophageal reflux disease)     H/O: pneumonia     Heme positive stool     Hyperlipidemia     Hypertension     Hypothyroidism     Morbid obesity with BMI of 50 0-59 9, adult ([facility] [address] )     Obesity, morbid ([facility] ) 08/22/2018    JANICE on CPAP     Pulmonary hypertension ([facility] ) 08/22/2018    Severe aortic stenosis     Simple goiter     Skin cyst     within the armpits, right    Wears glasses      Past Surgical History:   Procedure Laterality Date    BREAST BIOPSY      CARDIAC CATHETERIZATION      CARPAL TUNNEL RELEASE Bilateral     CHOLECYSTECTOMY      DILATION AND CURETTAGE OF UTERUS      HYSTEROSCOPY      MASTOID SURGERY      FL COLONOSCOPY FLX DX W/COLLJ SPEC WHEN PFRMD N/A 9/6/2018    Procedure: COLONOSCOPY;  Surgeon: Shannon Barrientos MD;  [facility]: [facility]; Service: Gastroenterology    FL ECHO TRANSESOPHAG R-T 2D W/PRB IMG ACQUISJ I&R N/A 10/9/2018    Procedure: INTRA-OP TRANSESOPHAGEAL ECHOCARDIOGRAM (GARRISON); Surgeon: Vicky Alvarez DO;  [facility];  Service: Cardiac Surgery    FL ESOPHAGOGASTRODUODENOSCOPY TRANSORAL DIAGNOSTIC N/A 8/31/2018    Procedure: ESOPHAGOGASTRODUODENOSCOPY (EGD); Surgeon: Shannon Barrientos MD;  [facility]; Service: Gastroenterology    FL REPLACE AORTIC VALVE OPENFEMORAL ARTERY APPROACH N/A 10/9/2018    Procedure: REPLACEMENT AORTIC VALVE TRANSCATHETER (TAVR) TRANSFEMORAL W/ 23 MM MENDOZA NOE S3 VALVE (ACCESS OF LEFT);   Surgeon: Vicky Alvarez DO;  [facility];  Service: Cardiac Surgery    TOTAL HIP ARTHROPLASTY Left 2007    TOTAL KNEE ARTHROPLASTY Bilateral       Family History:     Family History   Problem Relation Age of Onset    Diabetes Mother    Mavis Cousin Stroke Mother     Cancer Father     Lung cancer Father     Diabetes Sister     Heart disease Sister     Hypertension Sister     Coronary artery disease Family     Diabetes Family     Hypertension Family     Cancer Family     Stroke Family     Thyroid disease Neg Hx       Social History:     E-Cigarette/Vaping    E-Cigarette Use Never User      E-Cigarette/Vaping Substances    Nicotine No     THC No     CBD No     Flavoring No     Other No     Unknown No      Social History     Socioeconomic History    Marital status: Single     Spouse name: None    Number of children: None    Years of education: None    Highest education level: None   Occupational History    None   Social Needs    Financial resource strain: None    Food insecurity:     Worry: None     Inability: None    Transportation needs:     Medical: None     Non-medical: None   Tobacco Use    Smoking status: Never Smoker    Smokeless tobacco: Never Used   Substance and Sexual Activity    Alcohol use: No    Drug use: No    Sexual activity: Never   Lifestyle    Physical activity:     Days per week: None     Minutes per session: None    Stress: None   Relationships    Social connections:     Talks on phone: None     Gets together: None     Attends [employer] service: None     Active member of club or organization: None     Attends meetings of clubs or organizations: None     Relationship status: None    Intimate partner violence:     Fear of current or ex partner: None     Emotionally abused: None     Physically abused: None     Forced sexual activity: None   Other Topics Concern    None   Social History Narrative    Denied drinking coffee    Denied exercise habits    Most recent tobacco use screenin2018      Do you currently or have you served in the [employer]:   No      Were you activated, into active duty, as a member of the [employer] or as a Reservist:   No           Medications and Allergies: Current Outpatient Medications   Medication Sig Dispense Refill    albuterol (2 5 mg/3 mL) 0 083 % nebulizer solution Take 1 vial (2 5 mg total) by nebulization every 6 (six) hours as needed for wheezing or shortness of breath 360 mL 1    albuterol (PROVENTIL HFA,VENTOLIN HFA) 90 mcg/act inhaler Inhale 2 puffs every 6 (six) hours as needed for wheezing 1 Inhaler 3    aspirin (ECOTRIN LOW STRENGTH) 81 mg EC tablet Take 1 tablet (81 mg total) by mouth daily 100 tablet 0    b complex vitamins capsule Take 1 capsule by mouth 2 (two) times a day        BREO ELLIPTA 100-25 MCG/INH inhaler INHALE 1 PUFF DAILY RINSE MOUTH AFTER USE  1 Inhaler 3    busPIRone (BUSPAR) 5 mg tablet Take 1 tablet (5 mg total) by mouth 3 (three) times a day 30 tablet 0    Calcium Carb-Cholecalciferol (CALCIUM 600 + D PO) Take 1 tablet by mouth 2 (two) times a day      Cranberry 250 MG TABS Take by mouth      Fesoterodine Fumarate ER (TOVIAZ) 8 MG TB24 Take 8 mg by mouth daily      fluticasone (FLONASE) 50 mcg/act nasal spray SPRAY 2 SPRAYS INTO EACH NOSTRIL EVERY DAY  0    furosemide (LASIX) 20 mg tablet Take 1 tablet (20 mg total) by mouth daily 90 tablet 3    levothyroxine 50 mcg tablet TAKE 1 TABLET (50 MCG TOTAL) BY MOUTH DAILY 90 tablet 1    loratadine (CLARITIN) 10 mg tablet Take 1 tablet (10 mg total) by mouth daily 90 tablet 1    metoprolol succinate (TOPROL-XL) 25 mg 24 hr tablet Take 1 tablet (25 mg total) by mouth daily 90 tablet 3    olmesartan (BENICAR) 5 mg tablet Take 1 tablet (5 mg total) by mouth daily 90 tablet 1    omeprazole (PriLOSEC) 40 MG capsule Take 1 capsule (40 mg total) by mouth 2 (two) times a day 180 capsule 1    sertraline (ZOLOFT) 100 mg tablet Take 1 tablet (100 mg total) by mouth daily 90 tablet 0    simvastatin (ZOCOR) 40 mg tablet TAKE 1 TABLET (40 MG TOTAL) BY MOUTH DAILY AT BEDTIME 90 tablet 1    temazepam (RESTORIL) 7 5 mg capsule TAKE 1 CAPSULE (7 5 MG TOTAL) BY MOUTH DAILY AT BEDTIME AS NEEDED FOR SLEEP 90 capsule 0     No current facility-administered medications for this visit  Allergies   Allergen Reactions    Latex Rash    Neosporin [Neomycin-Bacitracin Zn-Polymyx] Rash and Other (See Comments)     hives per [facility] order      Immunizations:     Immunization History   Administered Date(s) Administered    Pneumococcal Conjugate 13-Valent 06/19/2018    Pneumococcal Polysaccharide PPV23 07/01/2019      Health Maintenance: There are no preventive care reminders to display for this patient  Topic Date Due    Influenza Vaccine  07/01/2020      Medicare Health Risk Assessment:     /82   Pulse 64   Temp 99 2 °F (37 3 °C) (Tympanic)   Resp 18   Ht 4' 6 5" (1 384 m)   Wt 101 kg (223 lb 9 6 oz)   SpO2 93%   BMI 52 93 kg/m²      [facility] is here for her Subsequent Wellness visit  Last Medicare Wellness visit information reviewed, patient interviewed and updates made to the record today  Health Risk Assessment:   Patient rates overall health as fair  Patient feels that their physical health rating is same  Eyesight was rated as same  Hearing was rated as same  Patient feels that their emotional and mental health rating is slightly worse  Pain experienced in the last 7 days has been some  Patient's pain rating has been 5/10  Patient states that she has experienced no weight loss or gain in last 6 months  Depression Screening:   PHQ-2 Score: 2  PHQ-9 Score: 5      Fall Risk Screening: In the past year, patient has experienced: history of falling in past year    Number of falls: 1  Injured during fall?: No    Feels unsteady when standing or walking?: Yes    Worried about falling?: Yes      Urinary Incontinence Screening:   Patient has not leaked urine accidently in the last six months  Home Safety:  Patient has trouble with stairs inside or outside of their home  Patient has working smoke alarms and has working carbon monoxide detector   Home safety hazards include: none      Nutrition:   Current diet is Regular  Medications:   Patient is currently taking over-the-counter supplements  OTC medications include: see medication list  Patient is able to manage medications  Activities of Daily Living (ADLs)/Instrumental Activities of Daily Living (IADLs):   Walk and transfer into and out of bed and chair?: Yes  Dress and groom yourself?: Yes    Bathe or shower yourself?: Yes    Feed yourself? Yes  Do your laundry/housekeeping?: Yes  Manage your money, pay your bills and track your expenses?: Yes  Make your own meals?: Yes    Do your own shopping?: Yes    Previous Hospitalizations:   Any hospitalizations or ED visits within the last 12 months?: No      Advance Care Planning:   Living will: No    Durable POA for healthcare: Yes    Advanced directive: No    Advanced directive counseling given: Yes    Five wishes given: Yes    Patient declined ACP directive: No    End of Life Decisions reviewed with patient: Yes    Provider agrees with end of life decisions: Yes      Comments: Pt would not want resuscitation if no hope for recovery       Cognitive Screening:   Provider or family/friend/caregiver concerned regarding cognition?: No    PREVENTIVE SCREENINGS      Cardiovascular Screening:    General: Screening Not Indicated and History Lipid Disorder      Diabetes Screening:     General: Screening Current      Breast Cancer Screening:     General: Risks and Benefits Discussed and Patient Declines      Cervical Cancer Screening:    General: Screening Not Indicated      Osteoporosis Screening:    General: Risks and Benefits Discussed    Due for: DXA Appendicular      Abdominal Aortic Aneurysm (AAA) Screening:        General: Risks and Benefits Discussed and Screening Not Indicated      Lung Cancer Screening:     General: Screening Not Indicated      Hepatitis C Screening:    General: Risks and Benefits Discussed and Screening Not Indicated      Rosemary Ferguson

## 2020-07-27 NOTE — PROGRESS NOTES
Assessment/Plan:     Chronic Problems:  Hypertension  BP is perfect  Continue current meds  Depression with anxiety  Feels she is doing better s/p getting a dog, but I have concerns for safety  Caution walking the dog  Pulmonary hypertension (HCC)  Keep the f/u appt with Dr Amos Aguirre  SOB (shortness of breath)  Pt was given a spacer and instructed on the use of the inhaler  Try 2 puffs one minute apart prior to walking or any activity every 4 to 6 hours  Obesity, morbid ([address] )  Cut back on portion sizes, caution with exercise  Cut back on the sweets! Visit Diagnosis:  Diagnoses and all orders for this visit:    Medicare annual wellness visit, subsequent    Asymptomatic postmenopausal state  -     DXA bone density spine hip and pelvis; Future    Essential hypertension    Depression with anxiety    Pulmonary hypertension (HCC)    Insomnia, unspecified type  -     temazepam (RESTORIL) 7 5 mg capsule; Take 1 capsule (7 5 mg total) by mouth daily at bedtime as needed for sleep    Obesity, morbid (HCC)          Subjective:    Patient ID: Nieves Cooper is a [de-identified] y o  female  Pt is here for routine f/u appt  Very upset about her weight and was seen by weight management but insurance would not cover  Thinks that is what is causing her sob with exertion  Has appt with cardiology, Dr Amos Aguirre, in August and also appt with pulmonary in August  Pt is currently on 3 liters when wearing a mask but at home uses 2 liter without a mask  Uses her oxygen and cpap at night  Pt just got herself a dogg and she is able to walk her - Anna  Had labs done and here to discuss results  No new med changes as pt did not take the buspar after several doses  Feeling better since she got the dog  Takes all other meds as directed  No side effects noted         The following portions of the patient's history were reviewed and updated as appropriate: allergies, current medications, past family history, past medical history, past social history, past surgical history and problem list     Review of Systems   Constitutional: Positive for fatigue  Negative for chills, diaphoresis and fever  HENT: Positive for ear pain (on occasion and has f/u with Dr Yesica Rosario in December  No ear pain now  ) and hearing loss (worse after the surgery)  Negative for postnasal drip, rhinorrhea, sinus pressure, sinus pain, sneezing and sore throat  Eyes: Negative  Respiratory: Positive for cough and shortness of breath (only with exertion)  Negative for wheezing  Pt was given a ventolin inhaler by pulmonary but she does not use it as she does not know how to use it  Cardiovascular: Negative for chest pain and palpitations  Gastrointestinal: Negative for abdominal pain, constipation, diarrhea, nausea and vomiting  Genitourinary: Positive for urgency  Negative for dysuria and frequency  Still with some urinary incontinence but better on meds   Musculoskeletal: Positive for back pain and gait problem (ambulates with walker  )  Negative for arthralgias  Neurological: Negative for dizziness, light-headedness and headaches (but sometimes has pressure in the head  )  Psychiatric/Behavioral: Positive for dysphoric mood (but better since the dog  )  The patient is not nervous/anxious            /82   Pulse 64   Temp 99 2 °F (37 3 °C) (Tympanic)   Resp 18   Ht 4' 6 5" (1 384 m)   Wt 101 kg (223 lb 9 6 oz)   SpO2 93%   BMI 52 93 kg/m²   Social History     Socioeconomic History    Marital status: Single     Spouse name: Not on file    Number of children: Not on file    Years of education: Not on file    Highest education level: Not on file   Occupational History    Not on file   Social Needs    Financial resource strain: Not on file    Food insecurity:     Worry: Not on file     Inability: Not on file    Transportation needs:     Medical: Not on file     Non-medical: Not on file   Tobacco Use    Smoking status: Never Smoker    Smokeless tobacco: Never Used   Substance and Sexual Activity    Alcohol use: No    Drug use: No    Sexual activity: Never   Lifestyle    Physical activity:     Days per week: Not on file     Minutes per session: Not on file    Stress: Not on file   Relationships    Social connections:     Talks on phone: Not on file     Gets together: Not on file     Attends Ca[employer] service: Not on file     Active member of club or organization: Not on file     Attends meetings of clubs or organizations: Not on file     Relationship status: Not on file    Intimate partner violence:     Fear of current or ex partner: Not on file     Emotionally abused: Not on file     Physically abused: Not on file     Forced sexual activity: Not on file   Other Topics Concern    Not on file   Social History Narrative    Denied drinking coffee    Denied exercise habits    Most recent tobacco use screenin2018      Do you currently or have you served in [employer]:   No      Were you activated, into active duty, as a member of the [email or web address] or as a Reservist:   No          Past Medical History:   Diagnosis Date    Anemia 2018    Anxiety     Arthritis     AVB (atrioventricular block)     first degree    Cataract     CHF (congestive heart failure) ([address]     COPD, mild ([address] )     Coronary artery disease     Dislocation of right shoulder joint     Frequent UTI     GERD (gastroesophageal reflux disease)     H/O: pneumonia     Heme positive stool     Hyperlipidemia     Hypertension     Hypothyroidism     Morbid obesity with BMI of 50 0-59 9, adult ([address] )     Obesity, morbid ([facility] ) 2018    JANICE on CPAP     Pulmonary hypertension ([facility] ) 2018    Severe aortic stenosis     Simple goiter     Skin cyst     within the armpits, right    Wears glasses      Family History   Problem Relation Age of Onset    Diabetes Mother     Stroke Mother     Cancer Father     Lung cancer Father  Diabetes Sister     Heart disease Sister     Hypertension Sister     Coronary artery disease Family     Diabetes Family     Hypertension Family     Cancer Family     Stroke Family     Thyroid disease Neg Hx      Past Surgical History:   Procedure Laterality Date    BREAST BIOPSY      CARDIAC CATHETERIZATION      CARPAL TUNNEL RELEASE Bilateral     CHOLECYSTECTOMY      DILATION AND CURETTAGE OF UTERUS      HYSTEROSCOPY      MASTOID SURGERY      AR COLONOSCOPY FLX DX W/COLLJ SPEC WHEN PFRMD N/A 9/6/2018    Procedure: COLONOSCOPY;  Surgeon: Satish Cohn MD;  [facility]; Service: Gastroenterology    AR ECHO TRANSESOPHAG R-T 2D W/PRB IMG ACQUISJ I&R N/A 10/9/2018    Procedure: INTRA-OP TRANSESOPHAGEAL ECHOCARDIOGRAM (GARRISON); Surgeon: Odell Clayton DO;  [facility];  Service: Cardiac Surgery    AR ESOPHAGOGASTRODUODENOSCOPY TRANSORAL DIAGNOSTIC N/A 8/31/2018    Procedure: ESOPHAGOGASTRODUODENOSCOPY (EGD); Surgeon: Satish Cohn MD;  [facility]; Service: Gastroenterology    AR REPLACE AORTIC VALVE OPENFEMORAL ARTERY APPROACH N/A 10/9/2018    Procedure: REPLACEMENT AORTIC VALVE TRANSCATHETER (TAVR) TRANSFEMORAL W/ 23 MM MENDOZA NOE S3 VALVE (ACCESS OF LEFT);   Surgeon: Odell Clayton DO;  [facility];  Service: Cardiac Surgery    TOTAL HIP ARTHROPLASTY Left 2007    TOTAL KNEE ARTHROPLASTY Bilateral        Current Outpatient Medications:     albuterol (2 5 mg/3 mL) 0 083 % nebulizer solution, Take 1 vial (2 5 mg total) by nebulization every 6 (six) hours as needed for wheezing or shortness of breath, Disp: 360 mL, Rfl: 1    albuterol (PROVENTIL HFA,VENTOLIN HFA) 90 mcg/act inhaler, Inhale 2 puffs every 6 (six) hours as needed for wheezing, Disp: 1 Inhaler, Rfl: 3    aspirin (ECOTRIN LOW STRENGTH) 81 mg EC tablet, Take 1 tablet (81 mg total) by mouth daily, Disp: 100 tablet, Rfl: 0    b complex vitamins capsule, Take 1 capsule by mouth 2 (two) times a day  , Disp: , Rfl:     BREO ELLIPTA 100-25 MCG/INH inhaler, INHALE 1 PUFF DAILY RINSE MOUTH AFTER USE , Disp: 1 Inhaler, Rfl: 3    Calcium Carb-Cholecalciferol (CALCIUM 600 + D PO), Take 1 tablet by mouth 2 (two) times a day, Disp: , Rfl:     Cranberry 250 MG TABS, Take by mouth, Disp: , Rfl:     Fesoterodine Fumarate ER (TOVIAZ) 8 MG TB24, Take 8 mg by mouth daily, Disp: , Rfl:     fluticasone (FLONASE) 50 mcg/act nasal spray, SPRAY 2 SPRAYS INTO EACH NOSTRIL EVERY DAY, Disp: , Rfl: 0    furosemide (LASIX) 20 mg tablet, Take 1 tablet (20 mg total) by mouth daily, Disp: 90 tablet, Rfl: 3    levothyroxine 50 mcg tablet, TAKE 1 TABLET (50 MCG TOTAL) BY MOUTH DAILY, Disp: 90 tablet, Rfl: 1    loratadine (CLARITIN) 10 mg tablet, Take 1 tablet (10 mg total) by mouth daily, Disp: 90 tablet, Rfl: 1    metoprolol succinate (TOPROL-XL) 25 mg 24 hr tablet, Take 1 tablet (25 mg total) by mouth daily, Disp: 90 tablet, Rfl: 3    olmesartan (BENICAR) 5 mg tablet, Take 1 tablet (5 mg total) by mouth daily, Disp: 90 tablet, Rfl: 1    omeprazole (PriLOSEC) 40 MG capsule, Take 1 capsule (40 mg total) by mouth 2 (two) times a day, Disp: 180 capsule, Rfl: 1    sertraline (ZOLOFT) 100 mg tablet, Take 1 tablet (100 mg total) by mouth daily, Disp: 90 tablet, Rfl: 0    simvastatin (ZOCOR) 40 mg tablet, TAKE 1 TABLET (40 MG TOTAL) BY MOUTH DAILY AT BEDTIME, Disp: 90 tablet, Rfl: 1    temazepam (RESTORIL) 7 5 mg capsule, Take 1 capsule (7 5 mg total) by mouth daily at bedtime as needed for sleep, Disp: 90 capsule, Rfl: 0    Allergies   Allergen Reactions    Latex Rash    Neosporin [Neomycin-Bacitracin Zn-Polymyx] Rash and Other (See Comments)     hives per [facility] order          Lab Review   Appointment on 07/01/2020   Component Date Value    WBC 07/01/2020 8 50     RBC 07/01/2020 4 96     Hemoglobin 07/01/2020 13 6     Hematocrit 07/01/2020 44 9     MCV 07/01/2020 91     MCH 07/01/2020 27 4     MCHC 07/01/2020 30 3*    RDW 07/01/2020 15 2*    MPV 07/01/2020 10 0     Platelets 30/30/1457 356     nRBC 07/01/2020 0     Neutrophils Relative 07/01/2020 57     Immat GRANS % 07/01/2020 1     Lymphocytes Relative 07/01/2020 24     Monocytes Relative 07/01/2020 10     Eosinophils Relative 07/01/2020 7*    Basophils Relative 07/01/2020 1     Neutrophils Absolute 07/01/2020 4 95     Immature Grans Absolute 07/01/2020 0 05     Lymphocytes Absolute 07/01/2020 2 04     Monocytes Absolute 07/01/2020 0 82     Eosinophils Absolute 07/01/2020 0 58     Basophils Absolute 07/01/2020 0 06     Sodium 07/01/2020 142     Potassium 07/01/2020 4 4     Chloride 07/01/2020 104     CO2 07/01/2020 29     ANION GAP 07/01/2020 9     BUN 07/01/2020 18     Creatinine 07/01/2020 0 83     Glucose, Fasting 07/01/2020 96     Calcium 07/01/2020 9 7     AST 07/01/2020 26     ALT 07/01/2020 27     Alkaline Phosphatase 07/01/2020 84     Total Protein 07/01/2020 7 8     Albumin 07/01/2020 3 6     Total Bilirubin 07/01/2020 0 50     eGFR 07/01/2020 67     NT-proBNP 07/01/2020 185     Cholesterol 07/01/2020 143     Triglycerides 07/01/2020 87     HDL, Direct 07/01/2020 63     LDL Calculated 07/01/2020 63     Non-HDL-Chol (CHOL-HDL) 07/01/2020 80    Office Visit on 06/30/2020   Component Date Value    Creatinine, Ur 07/01/2020 34 6     Microalbum  ,U,Random 07/01/2020 10 0     Microalb Creat Ratio 07/01/2020 29         Imaging: No results found  Objective:     Physical Exam   Constitutional: She is oriented to person, place, and time  She appears well-developed and well-nourished  No distress  Pt has Pickwickian appearance  HENT:   Head: Normocephalic and atraumatic  Right Ear: External ear normal    Mouth/Throat: Oropharynx is clear and moist    Left ear canal now with visible hole through tympanoplasty site  Not clear if this is new  Pt is refusing to see Dr Lange Labs sooner than scheduled appt  Eyes: Pupils are equal, round, and reactive to light  Conjunctivae and EOM are normal  Right eye exhibits no discharge  Left eye exhibits no discharge  Neck: Normal range of motion  Neck supple  Cardiovascular: Normal rate, regular rhythm and normal heart sounds  Pulmonary/Chest: Effort normal  She has no wheezes  She has no rales  Decreased breath sounds at the bases  Currently on n/c 3liters    Abdominal: Soft  Bowel sounds are normal    Abdomen is pendulous   Musculoskeletal: She exhibits no edema, tenderness or deformity  Ambulates slowly with walker  Neurological: She is alert and oriented to person, place, and time  No cranial nerve deficit  Skin: Skin is warm and dry  She is not diaphoretic  No erythema  No pallor  Psychiatric: She has a normal mood and affect  Her behavior is normal  Judgment and thought content normal    Nursing note and vitals reviewed  Patient Instructions     Discussed all with patient  Reviewed labs  Blood pressure is perfect  Continue on your current 3 L oxygen when wearing a mask in 2 L when your home  Instructed on the use of the spacer  If you have any problem with the spacer come in here with your inhaler and I will show you had to do it but read the instructions very self-explanatory  Take 2 puffs of the inhaler prior to walking your dog  Start with 1 puff hold it for 9 seconds let it out and repeated again in a minute  I think this will help you open up your lungs and help your breathing  Keep the appointment with your cardiologist and the pulmonologist   If the ear is bothering you more you need to make an appointment with Dr Annabel Brantley again  Follow-up here in 3 months  Schedule the dexa scan and let me know if you change your mind about the shingles shot  Medicare Preventive Visit Patient Instructions  Thank you for completing your Welcome to Medicare Visit or Medicare Annual Wellness Visit today   Your next wellness visit will be due in one year (7/27/2021)  The screening/preventive services that you may require over the next 5-10 years are detailed below  Some tests may not apply to you based off risk factors and/or age  Screening tests ordered at today's visit but not completed yet may show as past due  Also, please note that scanned in results may not display below  Preventive Screenings:  Service Recommendations Previous Testing/Comments   Colorectal Cancer Screening  * Colonoscopy    * Fecal Occult Blood Test (FOBT)/Fecal Immunochemical Test (FIT)  * Fecal DNA/Cologuard Test  * Flexible Sigmoidoscopy Age: 54-65 years old   Colonoscopy: every 10 years (may be performed more frequently if at higher risk)  OR  FOBT/FIT: every 1 year  OR  Cologuard: every 3 years  OR  Sigmoidoscopy: every 5 years  Screening may be recommended earlier than age 48 if at higher risk for colorectal cancer  Also, an individualized decision between you and your healthcare provider will decide whether screening between the ages of 74-80 would be appropriate  Colonoscopy: Not on file  FOBT/FIT: 11/08/2018  Cologuard: Not on file  Sigmoidoscopy: Not on file         Breast Cancer Screening Age: 36 years old  Frequency: every 1-2 years  Not required if history of left and right mastectomy Mammogram: Not on file       Cervical Cancer Screening Between the ages of 21-29, pap smear recommended once every 3 years  Between the ages of 33-67, can perform pap smear with HPV co-testing every 5 years     Recommendations may differ for women with a history of total hysterectomy, cervical cancer, or abnormal pap smears in past  Pap Smear: Not on file    Screening Not Indicated   Hepatitis C Screening Once for adults born between 1945 and 1965  More frequently in patients at high risk for Hepatitis C Hep C Antibody: Not on file       Diabetes Screening 1-2 times per year if you're at risk for diabetes or have pre-diabetes Fasting glucose: 96 mg/dL   A1C: 5 7 %    Screening Current Cholesterol Screening Once every 5 years if you don't have a lipid disorder  May order more often based on risk factors  Lipid panel: 07/01/2020    Screening Not Indicated  History Lipid Disorder     Other Preventive Screenings Covered by Medicare:  1  Abdominal Aortic Aneurysm (AAA) Screening: covered once if your at risk  You're considered to be at risk if you have a family history of AAA  2  Lung Cancer Screening: covers low dose CT scan once per year if you meet all of the following conditions: (1) Age 50-69; (2) No signs or symptoms of lung cancer; (3) Current smoker or have quit smoking within the last 15 years; (4) You have a tobacco smoking history of at least 30 pack years (packs per day multiplied by number of years you smoked); (5) You get a written order from a healthcare provider  3  Glaucoma Screening: covered annually if you're considered high risk: (1) You have diabetes OR (2) Family history of glaucoma OR (3)  aged 48 and older OR (3)  American aged 72 and older  3  Osteoporosis Screening: covered every 2 years if you meet one of the following conditions: (1) You're estrogen deficient and at risk for osteoporosis based off medical history and other findings; (2) Have a vertebral abnormality; (3) On glucocorticoid therapy for more than 3 months; (4) Have primary hyperparathyroidism; (5) On osteoporosis medications and need to assess response to drug therapy  · Last bone density test (DXA Scan): Not on file  5  HIV Screening: covered annually if you're between the age of 12-76  Also covered annually if you are younger than 13 and older than 72 with risk factors for HIV infection  For pregnant patients, it is covered up to 3 times per pregnancy      Immunizations:  Immunization Recommendations   Influenza Vaccine Annual influenza vaccination during flu season is recommended for all persons aged >= 6 months who do not have contraindications   Pneumococcal Vaccine (Prevnar and Pneumovax)  * Prevnar = PCV13  * Pneumovax = PPSV23   Adults 25-60 years old: 1-3 doses may be recommended based on certain risk factors  Adults 72 years old: Prevnar (PCV13) vaccine recommended followed by Pneumovax (PPSV23) vaccine  If already received PPSV23 since turning 65, then PCV13 recommended at least one year after PPSV23 dose  Hepatitis B Vaccine 3 dose series if at intermediate or high risk (ex: diabetes, end stage renal disease, liver disease)   Tetanus (Td) Vaccine - COST NOT COVERED BY MEDICARE PART B Following completion of primary series, a booster dose should be given every 10 years to maintain immunity against tetanus  Td may also be given as tetanus wound prophylaxis  Tdap Vaccine - COST NOT COVERED BY MEDICARE PART B Recommended at least once for all adults  For pregnant patients, recommended with each pregnancy  Shingles Vaccine (Shingrix) - COST NOT COVERED BY MEDICARE PART B  2 shot series recommended in those aged 48 and above     Health Maintenance Due:  There are no preventive care reminders to display for this patient  Immunizations Due:      Topic Date Due    Influenza Vaccine  07/01/2020     Advance Directives   What are advance directives? Advance directives are legal documents that state your wishes and plans for medical care  These plans are made ahead of time in case you lose your ability to make decisions for yourself  Advance directives can apply to any medical decision, such as the treatments you want, and if you want to donate organs  What are the types of advance directives? There are many types of advance directives, and each state has rules about how to use them  You may choose a combination of any of the following:  · Living will: This is a written record of the treatment you want  You can also choose which treatments you do not want, which to limit, and which to stop at a certain time  This includes surgery, medicine, IV fluid, and tube feedings  · Durable power of  for healthcare [place] [facility]): This is a written record that states who you want to make healthcare choices for you when you are unable to make them for yourself  This person, called a proxy, is usually a family member or a friend  You may choose more than 1 proxy  · Do not resuscitate (DNR) order:  A DNR order is used in case your heart stops beating or you stop breathing  It is a request not to have certain forms of treatment, such as CPR  A DNR order may be included in other types of advance directives  · Medical directive: This covers the care that you want if you are in a coma, near death, or unable to make decisions for yourself  You can list the treatments you want for each condition  Treatment may include pain medicine, surgery, blood transfusions, dialysis, IV or tube feedings, and a ventilator (breathing machine)  · Values history: This document has questions about your views, beliefs, and how you feel and think about life  This information can help others choose the care that you would choose  Why are advance directives important? An advance directive helps you control your care  Although spoken wishes may be used, it is better to have your wishes written down  Spoken wishes can be misunderstood, or not followed  Treatments may be given even if you do not want them  An advance directive may make it easier for your family to make difficult choices about your care  Fall Prevention    Fall prevention  includes ways to make your home and other areas safer  It also includes ways you can move more carefully to prevent a fall  Health conditions that cause changes in your blood pressure, vision, or muscle strength and coordination may increase your risk for falls  Medicines may also increase your risk for falls if they make you dizzy, weak, or sleepy  Fall prevention tips:   · Stand or sit up slowly  · Use assistive devices as directed      · Wear shoes that fit well and have soles that   · Wear a personal alarm  · Stay active  · Manage your medical conditions  Home Safety Tips:  · Add items to prevent falls in the bathroom  · Keep paths clear  · Install bright lights in your home  · Keep items you use often on shelves within reach  · Paint or place reflective tape on the edges of your stairs  Urinary Incontinence   Urinary incontinence (UI)  is when you lose control of your bladder  UI develops because your bladder cannot store or empty urine properly  The 3 most common types of UI are stress incontinence, urge incontinence, or both  Medicines:   · May be given to help strengthen your bladder control  Report any side effects of medication to your healthcare provider  Do pelvic muscle exercises often:  Your pelvic muscles help you stop urinating  Squeeze these muscles tight for 5 seconds, then relax for 5 seconds  Gradually work up to squeezing for 10 seconds  Do 3 sets of 15 repetitions a day, or as directed  This will help strengthen your pelvic muscles and improve bladder control  Train your bladder:  Go to the bathroom at set times, such as every 2 hours, even if you do not feel the urge to go  You can also try to hold your urine when you feel the urge to go  For example, hold your urine for 5 minutes when you feel the urge to go  As that becomes easier, hold your urine for 10 minutes  Self-care:   · Keep a UI record  Write down how often you leak urine and how much you leak  Make a note of what you were doing when you leaked urine  · Drink liquids as directed  You may need to limit the amount of liquid you drink to help control your urine leakage  Do not drink any liquid right before you go to bed  Limit or do not have drinks that contain caffeine or alcohol  · Prevent constipation  Eat a variety of high-fiber foods  Good examples are high-fiber cereals, beans, vegetables, and whole-grain breads   Walking is the best way to trigger your intestines to have a bowel movement  · Exercise regularly and maintain a healthy weight  Weight loss and exercise will decrease pressure on your bladder and help you control your leakage  · Use a catheter as directed  to help empty your bladder  A catheter is a tiny, plastic tube that is put into your bladder to drain your urine  · Go to behavior therapy as directed  Behavior therapy may be used to help you learn to control your urge to urinate  Weight Management   Why it is important to manage your weight:  Being overweight increases your risk of health conditions such as heart disease, high blood pressure, type 2 diabetes, and certain types of cancer  It can also increase your risk for osteoarthritis, sleep apnea, and other respiratory problems  Aim for a slow, steady weight loss  Even a small amount of weight loss can lower your risk of health problems  How to lose weight safely:  A safe and healthy way to lose weight is to eat fewer calories and get regular exercise  You can lose up about 1 pound a week by decreasing the number of calories you eat by 500 calories each day  Healthy meal plan for weight management:  A healthy meal plan includes a variety of foods, contains fewer calories, and helps you stay healthy  A healthy meal plan includes the following:  · Eat whole-grain foods more often  A healthy meal plan should contain fiber  Fiber is the part of grains, fruits, and vegetables that is not broken down by your body  Whole-grain foods are healthy and provide extra fiber in your diet  Some examples of whole-grain foods are whole-wheat breads and pastas, oatmeal, brown rice, and bulgur  · Eat a variety of vegetables every day  Include dark, leafy greens such as spinach, kale, marlo greens, and mustard greens  Eat yellow and orange vegetables such as carrots, sweet potatoes, and winter squash  · Eat a variety of fruits every day    Choose fresh or canned fruit (canned in its own juice or light syrup) instead of juice  Fruit juice has very little or no fiber  · Eat low-fat dairy foods  Drink fat-free (skim) milk or 1% milk  Eat fat-free yogurt and low-fat cottage cheese  Try low-fat cheeses such as mozzarella and other reduced-fat cheeses  · Choose meat and other protein foods that are low in fat  Choose beans or other legumes such as split peas or lentils  Choose fish, skinless poultry (chicken or turkey), or lean cuts of red meat (beef or pork)  Before you cook meat or poultry, cut off any visible fat  · Use less fat and oil  Try baking foods instead of frying them  Add less fat, such as margarine, sour cream, regular salad dressing and mayonnaise to foods  Eat fewer high-fat foods  Some examples of high-fat foods include french fries, doughnuts, ice cream, and cakes  · Eat fewer sweets  Limit foods and drinks that are high in sugar  This includes candy, cookies, regular soda, and sweetened drinks  Exercise:  Exercise at least 30 minutes per day on most days of the week  Some examples of exercise include walking, biking, dancing, and swimming  You can also fit in more physical activity by taking the stairs instead of the elevator or parking farther away from stores  Ask your healthcare provider about the best exercise plan for you  © Copyright [employer] 2018 Information is for End User's use only and may not be sold, redistributed or otherwise used for commercial purposes  All illustrations and images included in CareNotes® are the copyrighted property of [employer] , Sally  or [address] [place] [address]    Portions of the record may have been created with voice recognition software  Occasional wrong word or "sound a like" substitutions may have occurred due to the inherent limitations of voice recognition software  Read the chart carefully and recognize, using context, where substitutions have occurred

## 2020-08-19 ENCOUNTER — OFFICE VISIT (OUTPATIENT)
Dept: CARDIOLOGY CLINIC | Facility: CLINIC | Age: 80
End: 2020-08-19
Payer: MEDICARE

## 2020-08-19 VITALS
TEMPERATURE: 98.3 F | RESPIRATION RATE: 22 BRPM | HEIGHT: 55 IN | DIASTOLIC BLOOD PRESSURE: 72 MMHG | WEIGHT: 216 LBS | OXYGEN SATURATION: 92 % | SYSTOLIC BLOOD PRESSURE: 128 MMHG | HEART RATE: 63 BPM | BODY MASS INDEX: 49.99 KG/M2

## 2020-08-19 DIAGNOSIS — I34.2 NON-RHEUMATIC MITRAL VALVE STENOSIS: ICD-10-CM

## 2020-08-19 DIAGNOSIS — E66.01 MORBID OBESITY (HCC): ICD-10-CM

## 2020-08-19 DIAGNOSIS — I35.0 SEVERE AORTIC STENOSIS: Primary | ICD-10-CM

## 2020-08-19 DIAGNOSIS — I27.20 PULMONARY HYPERTENSION (HCC): ICD-10-CM

## 2020-08-19 DIAGNOSIS — Z95.2 S/P TAVR (TRANSCATHETER AORTIC VALVE REPLACEMENT): ICD-10-CM

## 2020-08-19 DIAGNOSIS — I51.7 LVH (LEFT VENTRICULAR HYPERTROPHY): ICD-10-CM

## 2020-08-19 DIAGNOSIS — I05.9 MITRAL ANNULAR CALCIFICATION: ICD-10-CM

## 2020-08-19 DIAGNOSIS — I10 ESSENTIAL HYPERTENSION: ICD-10-CM

## 2020-08-19 DIAGNOSIS — I50.32 CHRONIC DIASTOLIC (CONGESTIVE) HEART FAILURE (HCC): ICD-10-CM

## 2020-08-19 DIAGNOSIS — G47.33 OSA (OBSTRUCTIVE SLEEP APNEA): ICD-10-CM

## 2020-08-19 DIAGNOSIS — E78.2 MIXED HYPERLIPIDEMIA: ICD-10-CM

## 2020-08-19 PROCEDURE — 3074F SYST BP LT 130 MM HG: CPT | Performed by: INTERNAL MEDICINE

## 2020-08-19 PROCEDURE — 1170F FXNL STATUS ASSESSED: CPT | Performed by: INTERNAL MEDICINE

## 2020-08-19 PROCEDURE — 4040F PNEUMOC VAC/ADMIN/RCVD: CPT | Performed by: INTERNAL MEDICINE

## 2020-08-19 PROCEDURE — 1036F TOBACCO NON-USER: CPT | Performed by: INTERNAL MEDICINE

## 2020-08-19 PROCEDURE — 1160F RVW MEDS BY RX/DR IN RCRD: CPT | Performed by: INTERNAL MEDICINE

## 2020-08-19 PROCEDURE — 99214 OFFICE O/P EST MOD 30 MIN: CPT | Performed by: INTERNAL MEDICINE

## 2020-08-19 PROCEDURE — 3078F DIAST BP <80 MM HG: CPT | Performed by: INTERNAL MEDICINE

## 2020-08-19 PROCEDURE — 3008F BODY MASS INDEX DOCD: CPT | Performed by: INTERNAL MEDICINE

## 2020-08-19 NOTE — PROGRESS NOTES
CARDIOLOGY OFFICE VISIT  [facility]  [address], [address] [place] [address], [place], [facility] Tracy Borden  Tel: [phone]      NAME: Tom Silva  AGE: [de-identified] y o  SEX: female  : 1940   MRN: [id number]      Chief Complaint:  Chief Complaint   Patient presents with    Follow-up     SOB with activity        History of Present Illness:   Des Melendez continues to be short of breath with activity as at baseline  Now on nasal oxygen during activity per pulmonology  Denies chest pain, palpitations, lightheadedness, syncope, swelling feet, orthopnea, PND, claudication  Severe aortic stenosis S/P TAVR on 10/9/18 -  On ASA  Patient is aware of antibiotic prophylaxis prior to dental work    Chronic diastolic congestive heart failure -  Currently euvolemic  On furosemide, beta-blocker, ARB  States she watches her salt intake very closely     Hypertension, LVH, DD -  Has had it for many years  Takes her medications regularly  Denies lightheadedness, headache, medication side effects        Hyperlipidemia -  Has had it for few years  Takes her medications regularly  Denies myalgia  Her PCP closely monitor the blood work     Mitral stenosis -  Stable  Follow-up with serial echocardiograms    PHTN - from JANICE, valvular disease     Morbid obesity -  Unable to lose weight due to inability to exercise    JANICE - now uses CPAP   She does have history of pulmonary hypertension       Past Medical History:  Past Medical History:   Diagnosis Date    Anemia 2018    Anxiety     Arthritis     AVB (atrioventricular block)     first degree    Cataract     CHF (congestive heart failure) (HCC)     COPD, mild (HCC)     Coronary artery disease     Dislocation of right shoulder joint     Frequent UTI     GERD (gastroesophageal reflux disease)     H/O: pneumonia     Heme positive stool     Hyperlipidemia     Hypertension     Hypothyroidism     Morbid obesity with BMI of 50 0-59 9, adult ([facility] )     Obesity, morbid ([facility] ) 08/22/2018    JANICE on CPAP     Pulmonary hypertension ([facility] ) 08/22/2018    Severe aortic stenosis     Simple goiter     Skin cyst     within the armpits, right    Wears glasses          Past Surgical History:  Past Surgical History:   Procedure Laterality Date    BREAST BIOPSY      CARDIAC CATHETERIZATION      CARPAL TUNNEL RELEASE Bilateral     CHOLECYSTECTOMY      DILATION AND CURETTAGE OF UTERUS      HYSTEROSCOPY      MASTOID SURGERY      VT COLONOSCOPY FLX DX W/COLLJ SPEC WHEN PFRMD N/A 9/6/2018    Procedure: COLONOSCOPY;  Surgeon: Myra Sorto MD;  [facility]; Service: Gastroenterology    VT ECHO TRANSESOPHAG R-T 2D W/PRB IMG ACQUISJ I&R N/A 10/9/2018    Procedure: INTRA-OP TRANSESOPHAGEAL ECHOCARDIOGRAM (GARRISON); Surgeon: Wang Holguin DO;  [facility];  Service: Cardiac Surgery    VT ESOPHAGOGASTRODUODENOSCOPY TRANSORAL DIAGNOSTIC N/A 8/31/2018    Procedure: ESOPHAGOGASTRODUODENOSCOPY (EGD); Surgeon: Myra Sorto MD;  [facility]; Service: Gastroenterology    VT REPLACE AORTIC VALVE OPENFEMORAL ARTERY APPROACH N/A 10/9/2018    Procedure: REPLACEMENT AORTIC VALVE TRANSCATHETER (TAVR) TRANSFEMORAL W/ 23 MM MENDOZA NOE S3 VALVE (ACCESS OF LEFT);   Surgeon: Wang Holguin DO;  [facility];  Service: Cardiac Surgery    TOTAL HIP ARTHROPLASTY Left 2007    TOTAL KNEE ARTHROPLASTY Bilateral          Family History:  Family History   Problem Relation Age of Onset    Diabetes Mother     Stroke Mother     Cancer Father     Lung cancer Father     Diabetes Sister     Heart disease Sister     Hypertension Sister     Coronary artery disease Family     Diabetes Family     Hypertension Family     Cancer Family     Stroke Family     Thyroid disease Neg Hx          Social History:  Social History     Socioeconomic History    Marital status: Single     Spouse name: None    Number of children: None    Years of education: None    Highest education level: None   Occupational History    None   Social Needs    Financial resource strain: None    Food insecurity     Worry: None     Inability: None    Transportation needs     Medical: None     Non-medical: None   Tobacco Use    Smoking status: Never Smoker    Smokeless tobacco: Never Used   Substance and Sexual Activity    Alcohol use: No    Drug use: No    Sexual activity: Never   Lifestyle    Physical activity     Days per week: None     Minutes per session: None    Stress: None   Relationships    Social connections     Talks on phone: None     Gets together: None     Attends Jain service: None     Active member of club or organization: None     Attends meetings of clubs or organizations: None     Relationship status: None    Intimate partner violence     Fear of current or ex partner: None     Emotionally abused: None     Physically abused: None     Forced sexual activity: None   Other Topics Concern    None   Social History Narrative    Denied drinking coffee    Denied exercise habits    Most recent tobacco use screenin2018      Do you currently or have you served in the [employer]:   No      Were you activated, into active duty, as a member of the [email or web address] or as a Reservist:   No              Active Problems:  Patient Active Problem List   Diagnosis    Hypothyroid    Hypertension    Hyperlipidemia    Reactive airway disease without complication    JANICE (obstructive sleep apnea)    LVH (left ventricular hypertrophy)    Mitral annular calcification    Mitral valve stenosis    Pulmonary hypertension (HCC)    Chronic diastolic (congestive) heart failure (HCC)    Anemia    Obesity, morbid ([address] )    Gastroesophageal reflux disease without esophagitis    Arthritis    AVB (atrioventricular block)    Chronic diastolic congestive heart failure (HCC)    COPD, mild ([address] )    Coronary artery disease  JANICE on CPAP    S/P TAVR (transcatheter aortic valve replacement)    Acute pulmonary insufficiency    Postoperative anemia due to acute blood loss    Encounter for postoperative care    Depression with anxiety    Chronic otitis media    Insomnia    SOB (shortness of breath)    Anxiety    Class 3 severe obesity with serious comorbidity and body mass index (BMI) of 50 0 to 59 9 in adult [facility])    Fatigue         The following portions of the patient's history were reviewed and updated as appropriate: past medical history, past surgical history, past family history,  past social history, current medications, allergies and problem list       Review of Systems:  Constitutional: Denies fever, chills  Eyes: Denies eye redness, eye discharge  ENT: Denies hearing loss, tinnitus, sneezing, nasal discharge, sore throat   Respiratory: Denies cough, expectoration, hemoptysis  +shortness of breath  Cardiovascular: Denies chest pain, palpitations, lower extremity swelling  Gastrointestinal: Denies abdominal pain, nausea, vomiting, hematemesis, diarrhea, bloody stools  Genito-Urinary: Denies dysuria, incontinence  Musculoskeletal: Denies back pain, joint pain, muscle pain  Neurologic: Denies lightheadedness, syncope, headache, seizures  Endocrine: Denies polydipsia, temperature intolerance  Allergy and Immunology: Denies hives, insect bite sensitivity  Hematological and Lymphatic: Denies bleeding problems, swollen glands   Psychological: Denies depression, suicidal ideation, anxiety, panic  Dermatological: Denies pruritus, rash, skin lesion changes        Vitals:  Vitals:    08/19/20 1445   BP: 128/72   Pulse: 63   Resp: 22   Temp: 98 3 °F (36 8 °C)   SpO2: 92%       Body mass index is 51 13 kg/m²  Weight (last 2 days)     Date/Time   Weight    08/19/20 1445   98 (216)              hysical Examination:  General:  Morbidly obese  Using a walker for support  Patient is not in acute distress   Awake, alert, oriented in time, place and person  Responding to commands  Head: Normocephalic  Atraumatic  Eyes: Both pupils normal sized, round and reactive to light  Nonicteric  ENT: Normal external ear canals  Neck: Supple  JVP not raised  Trachea central  No thyromegaly  Lungs: Bilateral bronchovascular breath sounds with no crackles or rhonchi  Chest wall: No tenderness  Cardiovascular: RRR  S1 and S2 normal  Crisp mechanical prosthetic valve sound+  Gastrointestinal: Abdomen soft, nontender  No guarding or rigidity  Liver and spleen not palpable  Bowel sounds present  Neurologic: Patient is awake, alert, oriented in time, place and person  Responding to command  Moving all extremities  Integumentary:  No skin rash  Lymphatic: No cervical lymphadenopathy  Back: Symmetric   No CVA tenderness  Extremities: No clubbing, cyanosis or edema      Laboratory Results:  CBC with diff:   Lab Results   Component Value Date    WBC 8 50 07/01/2020    RBC 4 96 07/01/2020    HGB 13 6 07/01/2020    HCT 44 9 07/01/2020    MCV 91 07/01/2020    MCH 27 4 07/01/2020    RDW 15 2 (H) 07/01/2020     07/01/2020       CMP:  Lab Results   Component Value Date    CREATININE 0 83 07/01/2020    BUN 18 07/01/2020    K 4 4 07/01/2020     07/01/2020    CO2 29 07/01/2020    CO2 32 10/09/2018    GLUCOSE 101 10/09/2018    ALKPHOS 84 07/01/2020    ALT 27 07/01/2020    AST 26 07/01/2020       Lab Results   Component Value Date    HGBA1C 5 7 09/27/2018    MG 2 0 06/05/2018       Lab Results   Component Value Date    TROPONINI <0 02 08/14/2018    TROPONINI <0 02 08/14/2018    TROPONINI <0 02 08/13/2018       Lipid Profile:   No results found for: CHOL  Lab Results   Component Value Date    HDL 63 07/01/2020    HDL 59 08/15/2018    HDL 65 (H) 06/05/2018     Lab Results   Component Value Date    LDLCALC 63 07/01/2020    LDLCALC 72 08/15/2018    LDLCALC 51 06/05/2018     Lab Results   Component Value Date    TRIG 87 07/01/2020    TRIG 112 08/15/2018    TRIG 83 2018       Cardiac testing:   Results for orders placed during the hospital encounter of 18   Echo complete with contrast if indicated    Narrative [address] [place] [address] 89 [phone]    Transthoracic Echocardiogram  2D, M-mode, Doppler, and Color Doppler    Study date:  14-Aug-2018    Patient: Vladimir Peña  MR number: [id number]  Account number: [de-identified]  : 1940  Age: 65 years  Gender: Female  Status: Inpatient  Location: Bedside  Height: 57 in  Weight: 224 lb  BP: 139/ 60 mmHg    Indications: Dyspnea, shortness of breath    Diagnoses: R06 00 - Dyspnea, unspecified    Sonographer:  Tonny Magdaleno RDCS  Interpreting Physician:  Cam Cordero MD  Referring Physician:  Hank Bermeo PA-C  Group:  [facility]    SUMMARY    LEFT VENTRICLE:  Systolic function was normal  Ejection fraction was estimated in the range of 55 % to 65 %  There were no regional wall motion abnormalities  There was mild concentric hypertrophy  Doppler parameters were consistent with abnormal left ventricular relaxation (grade 1 diastolic dysfunction)  LEFT ATRIUM:  The atrium was mildly dilated  MITRAL VALVE:  There was mild annular calcification  AORTIC VALVE:  The valve was not visualized well enough to rule out a bicuspid morphology  Leaflets exhibited marked calcification and markedly reduced cuspal separation  Transaortic velocity was increased due to valvular stenosis  There was moderate to severe stenosis  There was mild regurgitation  TRICUSPID VALVE:  There was mild regurgitation  Estimated peak PA pressure was 40 mmHg  HISTORY: PRIOR HISTORY: Risk factors: CAD, hypertension, hypercholesterolemia, JANICE and morbid obesity  PROCEDURE: The procedure was performed at the bedside  This was a routine study  The transthoracic approach was used   The study included complete 2D imaging, M-mode, complete spectral Doppler, and color Doppler  The heart rate was 66 bpm,  at the start of the study  Images were obtained from the parasternal, apical, subcostal, and suprasternal notch acoustic windows  Echocardiographic views were limited due to decreased penetration and lung interference  Image quality was  adequate  LEFT VENTRICLE: Size was normal  Systolic function was normal  Ejection fraction was estimated in the range of 55 % to 65 %  There were no regional wall motion abnormalities  There was mild concentric hypertrophy  DOPPLER: Doppler  parameters were consistent with abnormal left ventricular relaxation (grade 1 diastolic dysfunction)  RIGHT VENTRICLE: The size was normal  Systolic function was normal  Wall thickness was normal     LEFT ATRIUM: The atrium was mildly dilated  RIGHT ATRIUM: Size was normal     MITRAL VALVE: There was mild annular calcification  Valve structure was normal  There was normal leaflet separation  DOPPLER: The transmitral velocity was within the normal range  There was no evidence for stenosis  There was no  regurgitation  AORTIC VALVE: The valve was not visualized well enough to rule out a bicuspid morphology  Leaflets exhibited marked calcification and markedly reduced cuspal separation  DOPPLER: Transaortic velocity was increased due to valvular stenosis  There was moderate to severe stenosis  There was mild regurgitation  TRICUSPID VALVE: The valve structure was normal  There was normal leaflet separation  DOPPLER: The transtricuspid velocity was within the normal range  There was no evidence for stenosis  There was mild regurgitation  Estimated peak PA  pressure was 40 mmHg  PULMONIC VALVE: Leaflets exhibited normal thickness, no calcification, and normal cuspal separation  DOPPLER: The transpulmonic velocity was within the normal range  There was no regurgitation  PERICARDIUM: There was no pericardial effusion   The pericardium was normal in appearance  AORTA: The root exhibited normal size  SYSTEMIC VEINS: IVC: The inferior vena cava was normal in size and course  Respirophasic changes were normal     SYSTEM MEASUREMENT TABLES    2D  %FS: 36 9 %  Ao Diam: 3 2 cm  EDV(Teich): 89 2 ml  EF(Teich): 67 %  ESV(Teich): 29 5 ml  IVSd: 1 2 cm  LA Area: 25 9 cm2  LA Diam: 4 1 cm  LVEDV MOD A4C: 83 7 ml  LVEF MOD A4C: 83 2 %  LVESV MOD A4C: 14 ml  LVIDd: 4 4 cm  LVIDs: 2 8 cm  LVLd A4C: 8 2 cm  LVLs A4C: 5 9 cm  LVOT Diam: 2 1 cm  LVPWd: 1 2 cm  RA Area: 18 4 cm2  RVIDd: 4 4 cm  SV MOD A4C: 69 6 ml  SV(Teich): 59 7 ml    CW  AR Dec Stearns: 2 4 m/s2  AR Dec Time: 1508 3 ms  AR PHT: 437 4 ms  AR Vmax: 3 6 m/s  AR maxP 4 mmHg  AV Env  Ti: 296 9 ms  AV VTI: 90 6 cm  AV Vmax: 4 1 m/s  AV Vmean: 3 1 m/s  AV maxP 1 mmHg  AV meanP 1 mmHg  TR Vmax: 3 3 m/s  TR maxP 3 mmHg    MM  TAPSE: 2 4 cm    PW  VASU (VTI): 1 3 cm2  VASU Vmax: 1 cm2  E': 0 1 m/s  E/E': 18  LVOT Env  Ti: 388 2 ms  LVOT VTI: 34 7 cm  LVOT Vmax: 1 3 m/s  LVOT Vmean: 0 9 m/s  LVOT maxP 6 mmHg  LVOT meanPG: 3 8 mmHg  LVSV Dopp: 114 6 ml  MV A Jose: 1 4 m/s  MV Dec Stearns: 1 8 m/s2  MV DecT: 541 5 ms  MV E Jose: 1 m/s  MV E/A Ratio: 0 7  MV PHT: 157 ms  MVA By PHT: 1 4 cm2    [employer] Accredited Echocardiography Laboratory    Prepared and electronically signed by    Katarina Real MD  Signed 14-Aug-2018 18:35:14         Medications:    Current Outpatient Medications:     albuterol (2 5 mg/3 mL) 0 083 % nebulizer solution, Take 1 vial (2 5 mg total) by nebulization every 6 (six) hours as needed for wheezing or shortness of breath, Disp: 360 mL, Rfl: 1    albuterol (PROVENTIL HFA,VENTOLIN HFA) 90 mcg/act inhaler, Inhale 2 puffs every 6 (six) hours as needed for wheezing, Disp: 1 Inhaler, Rfl: 3    aspirin (ECOTRIN LOW STRENGTH) 81 mg EC tablet, Take 1 tablet (81 mg total) by mouth daily, Disp: 100 tablet, Rfl: 0    b complex vitamins capsule, Take 1 capsule by mouth 2 (two) times a day  , Disp: , Rfl:     BREO ELLIPTA 100-25 MCG/INH inhaler, INHALE 1 PUFF DAILY RINSE MOUTH AFTER USE , Disp: 1 Inhaler, Rfl: 3    Calcium Carb-Cholecalciferol (CALCIUM 600 + D PO), Take 1 tablet by mouth 2 (two) times a day, Disp: , Rfl:     Cranberry 250 MG TABS, Take by mouth, Disp: , Rfl:     Fesoterodine Fumarate ER (TOVIAZ) 8 MG TB24, Take 8 mg by mouth daily, Disp: , Rfl:     fluticasone (FLONASE) 50 mcg/act nasal spray, SPRAY 2 SPRAYS INTO EACH NOSTRIL EVERY DAY, Disp: , Rfl: 0    furosemide (LASIX) 20 mg tablet, Take 1 tablet (20 mg total) by mouth daily, Disp: 90 tablet, Rfl: 3    levothyroxine 50 mcg tablet, TAKE 1 TABLET (50 MCG TOTAL) BY MOUTH DAILY, Disp: 90 tablet, Rfl: 1    loratadine (CLARITIN) 10 mg tablet, Take 1 tablet (10 mg total) by mouth daily, Disp: 90 tablet, Rfl: 1    metoprolol succinate (TOPROL-XL) 25 mg 24 hr tablet, Take 1 tablet (25 mg total) by mouth daily, Disp: 90 tablet, Rfl: 3    olmesartan (BENICAR) 5 mg tablet, Take 1 tablet (5 mg total) by mouth daily, Disp: 90 tablet, Rfl: 1    omeprazole (PriLOSEC) 40 MG capsule, Take 1 capsule (40 mg total) by mouth 2 (two) times a day, Disp: 180 capsule, Rfl: 1    sertraline (ZOLOFT) 100 mg tablet, Take 1 tablet (100 mg total) by mouth daily, Disp: 90 tablet, Rfl: 0    simvastatin (ZOCOR) 40 mg tablet, TAKE 1 TABLET (40 MG TOTAL) BY MOUTH DAILY AT BEDTIME, Disp: 90 tablet, Rfl: 1    temazepam (RESTORIL) 7 5 mg capsule, Take 1 capsule (7 5 mg total) by mouth daily at bedtime as needed for sleep, Disp: 90 capsule, Rfl: 0      Allergies: Allergies   Allergen Reactions    Latex Rash    Neosporin [Neomycin-Bacitracin Zn-Polymyx] Rash and Other (See Comments)     hives per [facility] order       EKG:  Read by me   06/04/2019  Sinus rhythm  LAD  Poor R-wave progression in precordial leads  LVH    Assessment and Plan:  1  Severe aortic stenosis S/P TAVR   continue aspirin   Aware that she needs antibiotic prophylaxis prior to dental work    2  Chronic diastolic (congestive) heart failure (HCC)   low-salt diet  Daily weight  On furosemide / K, beta-blocker, ARB    3  Mitral annular calcification, Non-rheumatic mitral valve stenosis   follow up with serial echocardiograms    4  Essential hypertension   BP stable  Cont beta-blocker    5  Mixed hyperlipidemia   continue statin and diet control  Her PCP closely monitors her blood work    6  LVH (left ventricular hypertrophy)   tight BP control    7  Pulmonary hypertension (HCC)   likely from JANICE, MS, AS    8  Morbid obesity ([address] )   unable to lose weight    9  JANICE (obstructive sleep apnea)   regular CPAP use promoted    Recommend aggressive risk factor modification and therapeutic lifestyle changes  Low-salt, low-calorie, low-fat, low-cholesterol diet with regular exercise and to optimize weight  I will defer the ordering and monitoring of necessity lab studies to you, but I am available and happy to review and manage any of the data at your request in the future  Discussed concepts of atherosclerosis, including signs and symptoms of cardiac disease  Previous studies were reviewed  Safety measures were reviewed  Questions were entertained and answered  Patient was advised to report any problems requiring medical attention  Follow-up with PCP and appropriate specialist and lab work as discussed  Return for follow up visit as scheduled or earlier, if needed  Thank you for allowing me to participate in the care and evaluation of your patient  Should you have any questions, please feel free to contact me        Paty King MD  8/19/2020,3:04 PM

## 2020-08-20 ENCOUNTER — HOSPITAL ENCOUNTER (OUTPATIENT)
Dept: MAMMOGRAPHY | Facility: CLINIC | Age: 80
Discharge: HOME | End: 2020-08-20
Payer: MEDICARE

## 2020-08-20 DIAGNOSIS — Z78.0 ASYMPTOMATIC POSTMENOPAUSAL STATE: ICD-10-CM

## 2020-08-20 PROCEDURE — 77080 DXA BONE DENSITY AXIAL: CPT

## 2020-08-21 ENCOUNTER — TELEPHONE (OUTPATIENT)
Dept: PULMONOLOGY | Facility: CLINIC | Age: 80
End: 2020-08-21

## 2020-08-21 NOTE — TELEPHONE ENCOUNTER
COVID Pre-Visit Screening     1  Is this a family member screening? no  2  Have you travele of your state in the past 2 weeks? no  3  Do you presently have a fever or flu-like symptoms? no  4  Do you have symptoms of an upper respiratory infection like runny nose, sore throat, or cough? no  5  Are you suffering from new headache that you have not had in the past? no  6  Do you have/have you experienced any new shortness of breath recently? no  7  Do you have any new diarrhea, nausea or vomiting? no  8  Have you been in contact with anyone who has been sick or diagnosed with COVID-19? no  9  Do you have any new loss of taste or smell? no  10  Are you able to wear a mask without a valve for the entire visit?  yes

## 2020-08-24 ENCOUNTER — OFFICE VISIT (OUTPATIENT)
Dept: PULMONOLOGY | Facility: CLINIC | Age: 80
End: 2020-08-24
Payer: MEDICARE

## 2020-08-24 VITALS
DIASTOLIC BLOOD PRESSURE: 60 MMHG | OXYGEN SATURATION: 95 % | BODY MASS INDEX: 51.61 KG/M2 | TEMPERATURE: 98.4 F | WEIGHT: 223 LBS | HEIGHT: 55 IN | HEART RATE: 80 BPM | SYSTOLIC BLOOD PRESSURE: 110 MMHG

## 2020-08-24 DIAGNOSIS — J45.20 MILD INTERMITTENT REACTIVE AIRWAY DISEASE WITHOUT COMPLICATION: ICD-10-CM

## 2020-08-24 DIAGNOSIS — G47.33 OSA ON CPAP: Chronic | ICD-10-CM

## 2020-08-24 DIAGNOSIS — R06.02 SOB (SHORTNESS OF BREATH): Primary | ICD-10-CM

## 2020-08-24 DIAGNOSIS — I27.20 PULMONARY HYPERTENSION (HCC): Chronic | ICD-10-CM

## 2020-08-24 DIAGNOSIS — Z99.89 OSA ON CPAP: Chronic | ICD-10-CM

## 2020-08-24 PROCEDURE — 4040F PNEUMOC VAC/ADMIN/RCVD: CPT | Performed by: PHYSICIAN ASSISTANT

## 2020-08-24 PROCEDURE — 3078F DIAST BP <80 MM HG: CPT | Performed by: PHYSICIAN ASSISTANT

## 2020-08-24 PROCEDURE — 3008F BODY MASS INDEX DOCD: CPT | Performed by: PHYSICIAN ASSISTANT

## 2020-08-24 PROCEDURE — 3074F SYST BP LT 130 MM HG: CPT | Performed by: PHYSICIAN ASSISTANT

## 2020-08-24 PROCEDURE — 1036F TOBACCO NON-USER: CPT | Performed by: PHYSICIAN ASSISTANT

## 2020-08-24 PROCEDURE — 1170F FXNL STATUS ASSESSED: CPT | Performed by: PHYSICIAN ASSISTANT

## 2020-08-24 PROCEDURE — 1160F RVW MEDS BY RX/DR IN RCRD: CPT | Performed by: PHYSICIAN ASSISTANT

## 2020-08-24 PROCEDURE — 99214 OFFICE O/P EST MOD 30 MIN: CPT | Performed by: PHYSICIAN ASSISTANT

## 2020-08-24 NOTE — PROGRESS NOTES
Assessment/Plan:   Diagnoses and all orders for this visit:    SOB (shortness of breath)    Mild intermittent reactive airway disease without complication    JANICE on CPAP    Pulmonary hypertension ([address] )      Patient is here today for follow-up  She is overall stable with her breathing, continues to have chronic dyspnea on exertion which has been present for some time now  Likely multifactorial related to her underlying cardiac disease, obesity/deconditioning and pulmonary hypertension, reactive airway  PFTs in the past have shown mild restriction, no obstructive lung disease  She does have pulmonary hypertension with estimated pressure of 56 on echo done 8/2019  She continues on Breo daily, not using her albuterol rescue inhaler or nebulizer, did tell her to try using that on a regular basis to see if this helps with her breathing  She does also note an improvement when she uses the oxygen with exertion, is not using it regularly with exertion  Again discussed the importance of being compliant with the oxygen with exertion, this should help her with her dyspnea on exertion  She does use the oxygen at night with her CPAP  She will follow-up with us in 4 months or sooner if necessary  Return in about 4 months (around 12/24/2020)  All questions are answered to the patient's satisfaction and understanding  She verbalizes understanding  She is encouraged to call with any further questions or concerns  Portions of the record may have been created with voice recognition software  Occasional wrong word or "sound a like" substitutions may have occurred due to the inherent limitations of voice recognition software  Read the chart carefully and recognize, using context, where substitutions have occurred      Electronically Signed by Benjamín Moser PA-C    ______________________________________________________________________    Chief Complaint:   Chief Complaint   Patient presents with    Follow-up    COPD       Patient ID: Jessica Carlton is a [de-identified] y o  y o  female has a past medical history of Anemia (08/22/2018), Anxiety, Arthritis, AVB (atrioventricular block), Cataract, CHF (congestive heart failure) ([facility] [place] [facility] ), COPD, mild ([facility] [place] [facility] ), Coronary artery disease, Dislocation of right shoulder joint, Frequent UTI, GERD (gastroesophageal reflux disease), H/O: pneumonia, Heme positive stool, Hyperlipidemia, Hypertension, Hypothyroidism, Morbid obesity with BMI of 50 0-59 9, adult ([facility] [place] [facility] ), Obesity, morbid ([facility] [place] [facility] ) (08/22/2018), JANICE on CPAP, Pulmonary hypertension ([place] [id number] ) (08/22/2018), Severe aortic stenosis, Simple goiter, Skin cyst, and Wears glasses  8/24/2020  Patient presents today for follow-up visit  Patient is an 79-year-old female nonsmoker with past medical history of asthma, aortic stenosis status post TAVR, hypertension, JANICE on CPAP, hyperlipidemia, obesity, pulmonary hypertension, chronic respiratory failure on 2 L O2 with exertion and at night  She is here today for follow-up  She is overall stable with her breathing  Continues to have chronic dyspnea on exertion, no shortness of breath at rest but she feels this in if she exerts herself she become short of breath  Does note an improvement with the use of the oxygen though she is not using it all of the time with exertion  She continues with her Breo daily, has not used her nebulizer or rescue inhaler at all  She recently received a spacer from her PCP for her rescue inhaler  She is using her CPAP at night with the 2 L O2  Review of Systems   Constitutional: Negative  HENT: Negative  Respiratory: Positive for shortness of breath (chronic GALINDO)  Cardiovascular: Negative  Gastrointestinal: Negative  Genitourinary: Negative  Musculoskeletal: Positive for arthralgias  Skin: Negative  Allergic/Immunologic: Negative  Neurological: Negative  Psychiatric/Behavioral: Negative  Smoking history: She reports that she has never smoked   She has never used smokeless tobacco     The following portions of the patient's history were reviewed and updated as appropriate: allergies, current medications, past family history, past medical history, past social history, past surgical history and problem list     Immunization History   Administered Date(s) Administered    Pneumococcal Conjugate 13-Valent 06/19/2018    Pneumococcal Polysaccharide PPV23 07/01/2019     Current Outpatient Medications   Medication Sig Dispense Refill    albuterol (2 5 mg/3 mL) 0 083 % nebulizer solution Take 1 vial (2 5 mg total) by nebulization every 6 (six) hours as needed for wheezing or shortness of breath 360 mL 1    albuterol (PROVENTIL HFA,VENTOLIN HFA) 90 mcg/act inhaler Inhale 2 puffs every 6 (six) hours as needed for wheezing 1 Inhaler 3    aspirin (ECOTRIN LOW STRENGTH) 81 mg EC tablet Take 1 tablet (81 mg total) by mouth daily 100 tablet 0    b complex vitamins capsule Take 1 capsule by mouth 2 (two) times a day        BREO ELLIPTA 100-25 MCG/INH inhaler INHALE 1 PUFF DAILY RINSE MOUTH AFTER USE  1 Inhaler 3    Calcium Carb-Cholecalciferol (CALCIUM 600 + D PO) Take 1 tablet by mouth 2 (two) times a day      Cranberry 250 MG TABS Take by mouth      Fesoterodine Fumarate ER (TOVIAZ) 8 MG TB24 Take 8 mg by mouth daily      fluticasone (FLONASE) 50 mcg/act nasal spray SPRAY 2 SPRAYS INTO EACH NOSTRIL EVERY DAY  0    furosemide (LASIX) 20 mg tablet Take 1 tablet (20 mg total) by mouth daily 90 tablet 3    levothyroxine 50 mcg tablet TAKE 1 TABLET (50 MCG TOTAL) BY MOUTH DAILY 90 tablet 1    loratadine (CLARITIN) 10 mg tablet Take 1 tablet (10 mg total) by mouth daily 90 tablet 1    metoprolol succinate (TOPROL-XL) 25 mg 24 hr tablet Take 1 tablet (25 mg total) by mouth daily 90 tablet 3    olmesartan (BENICAR) 5 mg tablet Take 1 tablet (5 mg total) by mouth daily 90 tablet 1    omeprazole (PriLOSEC) 40 MG capsule Take 1 capsule (40 mg total) by mouth 2 (two) times a day 180 capsule 1    sertraline (ZOLOFT) 100 mg tablet Take 1 tablet (100 mg total) by mouth daily 90 tablet 0    simvastatin (ZOCOR) 40 mg tablet TAKE 1 TABLET (40 MG TOTAL) BY MOUTH DAILY AT BEDTIME 90 tablet 1    temazepam (RESTORIL) 7 5 mg capsule Take 1 capsule (7 5 mg total) by mouth daily at bedtime as needed for sleep 90 capsule 0     No current facility-administered medications for this visit  Allergies: Latex and Neosporin [neomycin-bacitracin zn-polymyx]    Objective:  Vitals:    08/24/20 1401 08/24/20 1403   BP: 110/60    Pulse: 80    Temp: 98 4 °F (36 9 °C)    SpO2:  95%   Weight: 101 kg (223 lb)    Height: 4' 6" (1 372 m)    Oxygen Therapy  SpO2: 95 %  Oxygen Therapy: Supplemental oxygen  O2 Delivery Method: Nasal cannula  O2 Flow Rate (L/min): 2 L/min    Wt Readings from Last 3 Encounters:   08/24/20 101 kg (223 lb)   08/19/20 98 kg (216 lb)   07/27/20 101 kg (223 lb 9 6 oz)     Body mass index is 53 77 kg/m²  Physical Exam  Vitals signs reviewed  Constitutional:       General: She is not in acute distress  Appearance: Normal appearance  HENT:      Head: Normocephalic and atraumatic  Mouth/Throat:      Pharynx: Oropharynx is clear  Eyes:      Pupils: Pupils are equal, round, and reactive to light  Neck:      Musculoskeletal: Normal range of motion  Cardiovascular:      Rate and Rhythm: Normal rate and regular rhythm  Pulmonary:      Effort: Pulmonary effort is normal       Breath sounds: Normal breath sounds  No decreased breath sounds, wheezing, rhonchi or rales  Abdominal:      Palpations: Abdomen is soft  Tenderness: There is no abdominal tenderness  Musculoskeletal: Normal range of motion  Right lower leg: No edema  Left lower leg: No edema  Skin:     General: Skin is warm and dry  Neurological:      Mental Status: She is alert and oriented to person, place, and time     Psychiatric:         Mood and Affect: Mood normal  Behavior: Behavior normal          Thought Content: Thought content normal          Judgment: Judgment normal          Lab Review:   Lab Results   Component Value Date    K 4 4 07/01/2020     07/01/2020    CO2 29 07/01/2020    CO2 32 10/09/2018    BUN 18 07/01/2020    CREATININE 0 83 07/01/2020    GLUCOSE 101 10/09/2018    CALCIUM 9 7 07/01/2020     Lab Results   Component Value Date    WBC 8 50 07/01/2020    HGB 13 6 07/01/2020    HCT 44 9 07/01/2020    MCV 91 07/01/2020     07/01/2020       Diagnostics:  I have personally reviewed pertinent reports  Reviewed prior imaging, PFT and echocardiogram   Office Spirometry Results:     ESS:    Dxa Bone Density Spine Hip And Pelvis    Result Date: 8/22/2020  Narrative: CENTRAL  DXA SCAN CLINICAL HISTORY:   [de-identified]year old post-menopausal  female risk factors include gastroesophageal reflux with Prilosec use  Hypertension with diuretic use  Left hip replacement  Osteoporosis screening  TECHNIQUE: Bone densitometry was performed using a Hologic Horizon C bone densitometer  Regions of interest appear properly placed  There are no obvious fractures or other confounding variables which could limit the study  Degenerative changes of the lumbar spine and hip  This will falsely elevate the bone mineral densities in these regions  Moderate scoliosis, concave right  COMPARISON:  None  RESULTS: LUMBAR SPINE:  L1-L4: BMD 1 372 gm/cm2 T-score 3 0 Z-score 5 7 LUMBAR SPINE L1 and L4 (average) : BMD 1 265 gm/sq-cm T-score is 2 1 Z-score is 4 7 RIGHT TOTAL HIP: BMD 0 729 gm/cm2 T-score -1 7 Z-score 0 3 RIGHT FEMORAL NECK: BMD 0 619 gm/cm2 T-score -2 1 Z-score 0 3 LEFT FOREARM : BMD 0 573 gm/sq-cm, T-score is  -1 9 Z-score is  1 4      Impression: 1  Based on the [facility] classification, the T-score of -2 1 in the right hip is consistent with LOW BONE MINERAL DENSITY (OSTEOPENIA)   2   Any secondary causes of low bone mineral density should be excluded prior to treatment, if clinically indicated  3   A daily intake of at least 1200 mg calcium and 800 - 1000 IU of Vitamin D, as well as weight bearing and muscle strengthening exercise, fall prevention and avoidance of tobacco and excessive alcohol intake as basic preventive measures are suggested  The 10 year risk of hip fracture is 3%, with the 10 year risk of major osteoporotic fracture being 12%, as calculated by the [facility] fracture risk assessment tool (FRAX)  The current NOF guidelines recommend treating patients with FRAX 10 year risk score of  >3% for hip fracture and >20% for major osteoporotic fracture   WHO CLASSIFICATION: Normal (a T-score of -1 0 or higher) Low bone mineral density (a T-score of less than -1 0 but higher than -2 5) Osteoporosis (a T-score of -2 5 or less) Severe osteoporosis (a T-score of -2 5 or less with a fragility fracture) Workstation performed: [id number]

## 2020-09-20 DIAGNOSIS — Z95.2 S/P TAVR (TRANSCATHETER AORTIC VALVE REPLACEMENT): ICD-10-CM

## 2020-09-20 DIAGNOSIS — I35.0 AORTIC STENOSIS, SEVERE: ICD-10-CM

## 2020-09-21 RX ORDER — SIMVASTATIN 40 MG
TABLET ORAL
Qty: 90 TABLET | Refills: 1 | Status: SHIPPED | OUTPATIENT
Start: 2020-09-21 | End: 2021-03-15

## 2020-10-05 DIAGNOSIS — J30.89 ENVIRONMENTAL AND SEASONAL ALLERGIES: ICD-10-CM

## 2020-10-05 RX ORDER — LORATADINE 10 MG/1
TABLET ORAL
Qty: 90 TABLET | Refills: 1 | Status: SHIPPED | OUTPATIENT
Start: 2020-10-05 | End: 2021-04-01

## 2020-10-07 DIAGNOSIS — E03.9 ACQUIRED HYPOTHYROIDISM: ICD-10-CM

## 2020-10-07 RX ORDER — LEVOTHYROXINE SODIUM 0.05 MG/1
TABLET ORAL
Qty: 90 TABLET | Refills: 1 | Status: SHIPPED | OUTPATIENT
Start: 2020-10-07 | End: 2021-03-31

## 2020-10-11 DIAGNOSIS — R06.02 SHORTNESS OF BREATH: ICD-10-CM

## 2020-10-12 RX ORDER — FLUTICASONE FUROATE AND VILANTEROL TRIFENATATE 100; 25 UG/1; UG/1
POWDER RESPIRATORY (INHALATION)
Qty: 1 INHALER | Refills: 3 | Status: SHIPPED | OUTPATIENT
Start: 2020-10-12 | End: 2020-12-28 | Stop reason: ALTCHOICE

## 2020-10-22 ENCOUNTER — OFFICE VISIT (OUTPATIENT)
Dept: FAMILY MEDICINE CLINIC | Facility: CLINIC | Age: 80
End: 2020-10-22
Payer: MEDICARE

## 2020-10-22 VITALS
WEIGHT: 212.2 LBS | TEMPERATURE: 97.5 F | DIASTOLIC BLOOD PRESSURE: 60 MMHG | BODY MASS INDEX: 49.11 KG/M2 | RESPIRATION RATE: 16 BRPM | HEIGHT: 55 IN | SYSTOLIC BLOOD PRESSURE: 120 MMHG | HEART RATE: 61 BPM | OXYGEN SATURATION: 93 %

## 2020-10-22 DIAGNOSIS — E78.5 HYPERLIPIDEMIA, UNSPECIFIED HYPERLIPIDEMIA TYPE: ICD-10-CM

## 2020-10-22 DIAGNOSIS — Z95.2 S/P TAVR (TRANSCATHETER AORTIC VALVE REPLACEMENT): ICD-10-CM

## 2020-10-22 DIAGNOSIS — I27.20 PULMONARY HYPERTENSION (HCC): Chronic | ICD-10-CM

## 2020-10-22 DIAGNOSIS — I10 ESSENTIAL HYPERTENSION: Primary | Chronic | ICD-10-CM

## 2020-10-22 DIAGNOSIS — E66.01 OBESITY, MORBID (HCC): Chronic | ICD-10-CM

## 2020-10-22 DIAGNOSIS — M85.80 OSTEOPENIA, UNSPECIFIED LOCATION: ICD-10-CM

## 2020-10-22 DIAGNOSIS — H72.92 PERFORATION OF LEFT TYMPANIC MEMBRANE: ICD-10-CM

## 2020-10-22 DIAGNOSIS — E03.9 ACQUIRED HYPOTHYROIDISM: ICD-10-CM

## 2020-10-22 DIAGNOSIS — L30.4 INTERTRIGO: ICD-10-CM

## 2020-10-22 DIAGNOSIS — M85.80 OSTEOPENIA, UNSPECIFIED LOCATION: Primary | ICD-10-CM

## 2020-10-22 PROCEDURE — 99214 OFFICE O/P EST MOD 30 MIN: CPT | Performed by: FAMILY MEDICINE

## 2020-10-22 RX ORDER — CLOTRIMAZOLE AND BETAMETHASONE DIPROPIONATE 10; .64 MG/G; MG/G
CREAM TOPICAL 2 TIMES DAILY
Qty: 30 G | Refills: 0 | Status: SHIPPED | OUTPATIENT
Start: 2020-10-22 | End: 2022-03-14 | Stop reason: ALTCHOICE

## 2020-10-22 RX ORDER — ALENDRONATE SODIUM 70 MG/1
70 TABLET ORAL
Qty: 4 TABLET | Refills: 5 | Status: SHIPPED | OUTPATIENT
Start: 2020-10-22 | End: 2021-03-08 | Stop reason: SDUPTHER

## 2020-11-06 DIAGNOSIS — F41.8 DEPRESSION WITH ANXIETY: ICD-10-CM

## 2020-11-06 RX ORDER — SERTRALINE HYDROCHLORIDE 100 MG/1
TABLET, FILM COATED ORAL
Qty: 90 TABLET | Refills: 0 | Status: SHIPPED | OUTPATIENT
Start: 2020-11-06 | End: 2021-02-02

## 2020-11-10 DIAGNOSIS — K21.00 GASTROESOPHAGEAL REFLUX DISEASE WITH ESOPHAGITIS: ICD-10-CM

## 2020-11-10 RX ORDER — OMEPRAZOLE 40 MG/1
CAPSULE, DELAYED RELEASE ORAL
Qty: 180 CAPSULE | Refills: 1 | Status: SHIPPED | OUTPATIENT
Start: 2020-11-10 | End: 2021-06-21

## 2020-11-23 ENCOUNTER — TELEPHONE (OUTPATIENT)
Dept: FAMILY MEDICINE CLINIC | Facility: CLINIC | Age: 80
End: 2020-11-23

## 2020-11-23 ENCOUNTER — HOSPITAL ENCOUNTER (EMERGENCY)
Facility: HOSPITAL | Age: 80
Discharge: HOME | End: 2020-11-23
Attending: EMERGENCY MEDICINE | Admitting: EMERGENCY MEDICINE
Payer: MEDICARE

## 2020-11-23 VITALS
WEIGHT: 215 LBS | RESPIRATION RATE: 18 BRPM | HEIGHT: 55 IN | DIASTOLIC BLOOD PRESSURE: 76 MMHG | HEART RATE: 83 BPM | SYSTOLIC BLOOD PRESSURE: 181 MMHG | OXYGEN SATURATION: 94 % | BODY MASS INDEX: 49.76 KG/M2 | TEMPERATURE: 98.5 F

## 2020-11-23 DIAGNOSIS — R04.0 ANTERIOR EPISTAXIS: Primary | ICD-10-CM

## 2020-11-23 PROCEDURE — 99283 EMERGENCY DEPT VISIT LOW MDM: CPT

## 2020-11-23 PROCEDURE — 30903 CONTROL OF NOSEBLEED: CPT | Performed by: EMERGENCY MEDICINE

## 2020-11-23 PROCEDURE — 99284 EMERGENCY DEPT VISIT MOD MDM: CPT | Performed by: EMERGENCY MEDICINE

## 2020-11-23 RX ORDER — AMOXICILLIN 250 MG/1
500 CAPSULE ORAL ONCE
Status: COMPLETED | OUTPATIENT
Start: 2020-11-23 | End: 2020-11-23

## 2020-11-23 RX ORDER — AMOXICILLIN 500 MG/1
500 CAPSULE ORAL EVERY 12 HOURS SCHEDULED
Qty: 6 CAPSULE | Refills: 0 | Status: SHIPPED | OUTPATIENT
Start: 2020-11-23 | End: 2020-11-26

## 2020-11-23 RX ADMIN — AMOXICILLIN 500 MG: 250 CAPSULE ORAL at 12:07

## 2020-11-23 RX ADMIN — SILVER NITRATE APPLICATORS 2 APPLICATOR: 25; 75 STICK TOPICAL at 12:07

## 2020-12-17 DIAGNOSIS — I10 ESSENTIAL HYPERTENSION: ICD-10-CM

## 2020-12-17 RX ORDER — OLMESARTAN MEDOXOMIL 5 MG/1
TABLET ORAL
Qty: 90 TABLET | Refills: 1 | Status: SHIPPED | OUTPATIENT
Start: 2020-12-17 | End: 2021-06-29 | Stop reason: SDUPTHER

## 2020-12-28 ENCOUNTER — OFFICE VISIT (OUTPATIENT)
Dept: PULMONOLOGY | Facility: CLINIC | Age: 80
End: 2020-12-28
Payer: MEDICARE

## 2020-12-28 VITALS
HEIGHT: 55 IN | HEART RATE: 64 BPM | SYSTOLIC BLOOD PRESSURE: 140 MMHG | OXYGEN SATURATION: 92 % | DIASTOLIC BLOOD PRESSURE: 64 MMHG | WEIGHT: 216 LBS | BODY MASS INDEX: 49.99 KG/M2 | RESPIRATION RATE: 18 BRPM | TEMPERATURE: 97.7 F

## 2020-12-28 DIAGNOSIS — J44.9 CHRONIC OBSTRUCTIVE PULMONARY DISEASE, UNSPECIFIED COPD TYPE (HCC): ICD-10-CM

## 2020-12-28 DIAGNOSIS — Z99.89 OSA ON CPAP: ICD-10-CM

## 2020-12-28 DIAGNOSIS — I27.20 PULMONARY HYPERTENSION (HCC): ICD-10-CM

## 2020-12-28 DIAGNOSIS — J96.11 CHRONIC HYPOXEMIC RESPIRATORY FAILURE (HCC): ICD-10-CM

## 2020-12-28 DIAGNOSIS — G47.33 OSA ON CPAP: ICD-10-CM

## 2020-12-28 DIAGNOSIS — R06.02 SOB (SHORTNESS OF BREATH): Primary | ICD-10-CM

## 2020-12-28 DIAGNOSIS — J45.20 MILD INTERMITTENT REACTIVE AIRWAY DISEASE WITHOUT COMPLICATION: ICD-10-CM

## 2020-12-28 PROCEDURE — 99214 OFFICE O/P EST MOD 30 MIN: CPT | Performed by: INTERNAL MEDICINE

## 2021-01-31 DIAGNOSIS — F41.8 DEPRESSION WITH ANXIETY: ICD-10-CM

## 2021-02-02 RX ORDER — SERTRALINE HYDROCHLORIDE 100 MG/1
TABLET, FILM COATED ORAL
Qty: 90 TABLET | Refills: 0 | Status: SHIPPED | OUTPATIENT
Start: 2021-02-02 | End: 2021-04-28

## 2021-02-17 ENCOUNTER — LAB (OUTPATIENT)
Dept: LAB | Facility: HOSPITAL | Age: 81
End: 2021-02-17
Payer: MEDICARE

## 2021-02-17 DIAGNOSIS — E03.9 ACQUIRED HYPOTHYROIDISM: ICD-10-CM

## 2021-02-17 DIAGNOSIS — E78.5 HYPERLIPIDEMIA, UNSPECIFIED HYPERLIPIDEMIA TYPE: ICD-10-CM

## 2021-02-17 LAB
ALBUMIN SERPL BCP-MCNC: 3.4 G/DL (ref 3.5–5)
ALP SERPL-CCNC: 73 U/L (ref 46–116)
ALT SERPL W P-5'-P-CCNC: 28 U/L (ref 12–78)
ANION GAP SERPL CALCULATED.3IONS-SCNC: 5 MMOL/L (ref 4–13)
AST SERPL W P-5'-P-CCNC: 30 U/L (ref 5–45)
BACTERIA UR QL AUTO: ABNORMAL /HPF
BILIRUB SERPL-MCNC: 0.4 MG/DL (ref 0.2–1)
BILIRUB UR QL STRIP: NEGATIVE
BUN SERPL-MCNC: 27 MG/DL (ref 5–25)
CALCIUM ALBUM COR SERPL-MCNC: 10.1 MG/DL (ref 8.3–10.1)
CALCIUM SERPL-MCNC: 9.6 MG/DL (ref 8.3–10.1)
CHLORIDE SERPL-SCNC: 103 MMOL/L (ref 100–108)
CHOLEST SERPL-MCNC: 160 MG/DL (ref 50–200)
CLARITY UR: ABNORMAL
CO2 SERPL-SCNC: 31 MMOL/L (ref 21–32)
COLOR UR: YELLOW
CREAT SERPL-MCNC: 0.99 MG/DL (ref 0.6–1.3)
CREAT UR-MCNC: 32.1 MG/DL
GFR SERPL CREATININE-BSD FRML MDRD: 54 ML/MIN/1.73SQ M
GLUCOSE P FAST SERPL-MCNC: 82 MG/DL (ref 65–99)
GLUCOSE UR STRIP-MCNC: NEGATIVE MG/DL
HDLC SERPL-MCNC: 72 MG/DL
HGB UR QL STRIP.AUTO: NEGATIVE
KETONES UR STRIP-MCNC: NEGATIVE MG/DL
LDLC SERPL CALC-MCNC: 70 MG/DL (ref 0–100)
LEUKOCYTE ESTERASE UR QL STRIP: ABNORMAL
MICROALBUMIN UR-MCNC: 16.1 MG/L (ref 0–20)
MICROALBUMIN/CREAT 24H UR: 50 MG/G CREATININE (ref 0–30)
NITRITE UR QL STRIP: NEGATIVE
NON-SQ EPI CELLS URNS QL MICRO: ABNORMAL /HPF
NONHDLC SERPL-MCNC: 88 MG/DL
PH UR STRIP.AUTO: 6 [PH]
POTASSIUM SERPL-SCNC: 5 MMOL/L (ref 3.5–5.3)
PROT SERPL-MCNC: 7.6 G/DL (ref 6.4–8.2)
PROT UR STRIP-MCNC: NEGATIVE MG/DL
RBC #/AREA URNS AUTO: ABNORMAL /HPF
SODIUM SERPL-SCNC: 139 MMOL/L (ref 136–145)
SP GR UR STRIP.AUTO: 1.01 (ref 1–1.03)
TRIGL SERPL-MCNC: 92 MG/DL
TSH SERPL DL<=0.05 MIU/L-ACNC: 1.87 UIU/ML (ref 0.36–3.74)
UROBILINOGEN UR QL STRIP.AUTO: 0.2 E.U./DL
WBC #/AREA URNS AUTO: ABNORMAL /HPF

## 2021-02-17 PROCEDURE — 80061 LIPID PANEL: CPT

## 2021-02-17 PROCEDURE — 81001 URINALYSIS AUTO W/SCOPE: CPT | Performed by: FAMILY MEDICINE

## 2021-02-17 PROCEDURE — 82570 ASSAY OF URINE CREATININE: CPT | Performed by: FAMILY MEDICINE

## 2021-02-17 PROCEDURE — 80053 COMPREHEN METABOLIC PANEL: CPT

## 2021-02-17 PROCEDURE — 84443 ASSAY THYROID STIM HORMONE: CPT

## 2021-02-17 PROCEDURE — 82043 UR ALBUMIN QUANTITATIVE: CPT | Performed by: FAMILY MEDICINE

## 2021-02-17 PROCEDURE — 36415 COLL VENOUS BLD VENIPUNCTURE: CPT

## 2021-02-22 DIAGNOSIS — I50.32 CHRONIC DIASTOLIC (CONGESTIVE) HEART FAILURE (HCC): ICD-10-CM

## 2021-02-22 RX ORDER — METOPROLOL SUCCINATE 25 MG/1
TABLET, EXTENDED RELEASE ORAL
Qty: 90 TABLET | Refills: 3 | Status: SHIPPED | OUTPATIENT
Start: 2021-02-22 | End: 2021-08-30 | Stop reason: SDUPTHER

## 2021-03-08 ENCOUNTER — OFFICE VISIT (OUTPATIENT)
Dept: FAMILY MEDICINE CLINIC | Facility: CLINIC | Age: 81
End: 2021-03-08
Payer: MEDICARE

## 2021-03-08 VITALS
OXYGEN SATURATION: 94 % | HEIGHT: 55 IN | RESPIRATION RATE: 18 BRPM | HEART RATE: 62 BPM | TEMPERATURE: 97.9 F | WEIGHT: 219 LBS | SYSTOLIC BLOOD PRESSURE: 140 MMHG | DIASTOLIC BLOOD PRESSURE: 60 MMHG | BODY MASS INDEX: 50.68 KG/M2

## 2021-03-08 DIAGNOSIS — Z95.2 S/P TAVR (TRANSCATHETER AORTIC VALVE REPLACEMENT): ICD-10-CM

## 2021-03-08 DIAGNOSIS — E66.01 MORBID OBESITY WITH BMI OF 50.0-59.9, ADULT (HCC): ICD-10-CM

## 2021-03-08 DIAGNOSIS — F33.9 DEPRESSION, RECURRENT (HCC): ICD-10-CM

## 2021-03-08 DIAGNOSIS — E78.2 MIXED HYPERLIPIDEMIA: Chronic | ICD-10-CM

## 2021-03-08 DIAGNOSIS — J44.9 CHRONIC OBSTRUCTIVE PULMONARY DISEASE, UNSPECIFIED COPD TYPE (HCC): ICD-10-CM

## 2021-03-08 DIAGNOSIS — I50.32 CHRONIC DIASTOLIC (CONGESTIVE) HEART FAILURE (HCC): Primary | ICD-10-CM

## 2021-03-08 DIAGNOSIS — E66.01 OBESITY, MORBID (HCC): Chronic | ICD-10-CM

## 2021-03-08 DIAGNOSIS — M85.80 OSTEOPENIA, UNSPECIFIED LOCATION: ICD-10-CM

## 2021-03-08 DIAGNOSIS — I10 ESSENTIAL HYPERTENSION: Chronic | ICD-10-CM

## 2021-03-08 PROCEDURE — 99214 OFFICE O/P EST MOD 30 MIN: CPT | Performed by: FAMILY MEDICINE

## 2021-03-08 RX ORDER — ALENDRONATE SODIUM 70 MG/1
70 TABLET ORAL
Qty: 4 TABLET | Refills: 5 | Status: SHIPPED | OUTPATIENT
Start: 2021-03-08 | End: 2021-08-31

## 2021-03-08 NOTE — PATIENT INSTRUCTIONS
Reviewed all with patient  Blood pressure is slightly elevated today  Try to cut back on salt work on the diet even 5 lb will help us  Your cholesterol is perfect your kidney function is stable  I strongly suggest we get the COVID vaccine as you are at risk for a bad outcome from this virus  Stay on your current medications keep the follow-up appointment with your cardiologist   Use your oxygen and inhalers as needed  I did tell patient if she gets the COVID shot and gets sick from it more than 1 day I will make a house call to her house to look in on her  Obesity   AMBULATORY CARE:   Obesity  is when your body mass index (BMI) is greater than 30  Your healthcare provider will use your height and weight to measure your BMI  The risks of obesity include  many health problems, such as injuries or physical disability  You may need tests to check for the following:  · Diabetes    · High blood pressure or high cholesterol    · Heart disease    · Gallbladder or liver disease    · Cancer of the colon, breast, prostate, liver, or kidney    · Sleep apnea    · Arthritis or gout    Seek care immediately if:   · You have a severe headache, confusion, or difficulty speaking  · You have weakness on one side of your body  · You have chest pain, sweating, or shortness of breath  Contact your healthcare provider if:   · You have symptoms of gallbladder or liver disease, such as pain in your upper abdomen  · You have knee or hip pain and discomfort while walking  · You have symptoms of diabetes, such as intense hunger and thirst, and frequent urination  · You have symptoms of sleep apnea, such as snoring or daytime sleepiness  · You have questions or concerns about your condition or care  Treatment for obesity  focuses on helping you lose weight to improve your health  Even a small decrease in BMI can reduce the risk for many health problems   Your healthcare provider will help you set a weight-loss goal   · Lifestyle changes  are the first step in treating obesity  These include making healthy food choices and getting regular physical activity  Your healthcare provider may suggest a weight-loss program that involves coaching, education, and therapy  · Medicine  may help you lose weight when it is used with a healthy diet and physical activity  · Surgery  can help you lose weight if you are very obese and have other health problems  There are several types of weight-loss surgery  Ask your healthcare provider for more information  Be successful losing weight:   · Set small, realistic goals  An example of a small goal is to walk for 20 minutes 5 days a week  Haim goal is to lose 5% of your body weight  · Tell friends, family members, and coworkers about your goals  and ask for their support  Ask a friend to lose weight with you, or join a weight-loss support group  · Identify foods or triggers that may cause you to overeat , and find ways to avoid them  Remove tempting high-calorie foods from your home and workplace  Place a bowl of fresh fruit on your kitchen counter  If stress causes you to eat, then find other ways to cope with stress  · Keep a diary to track what you eat and drink  Also write down how many minutes of physical activity you do each day  Weigh yourself once a week and record it in your diary  Eating changes: You will need to eat 500 to 1,000 fewer calories each day than you currently eat to lose 1 to 2 pounds a week  The following changes will help you cut calories:  · Eat smaller portions  Use small plates, no larger than 9 inches in diameter  Fill your plate half full of fruits and vegetables  Measure your food using measuring cups until you know what a serving size looks like  · Eat 3 meals and 1 or 2 snacks each day  Plan your meals in advance  [employer] and eat at home most of the time  Eat slowly  Do not skip meals   Skipping meals can lead to overeating later in the day  This can make it harder for you to lose weight  Talk with a dietitian to help you make a meal plan and schedule that is right for you  · Eat fruits and vegetables at every meal   They are low in calories and high in fiber, which makes you feel full  Do not add butter, margarine, or cream sauce to vegetables  Use herbs to season steamed vegetables  · Eat less fat and fewer fried foods  Eat more baked or grilled chicken and fish  These protein sources are lower in calories and fat than red meat  Limit fast food  Dress your salads with olive oil and vinegar instead of bottled dressing  · Limit the amount of sugar you eat  Do not drink sugary beverages  Limit alcohol  Activity changes:  Physical activity is good for your body in many ways  It helps you burn calories and build strong muscles  It decreases stress and depression, and improves your mood  It can also help you sleep better  Talk to your healthcare provider before you begin an exercise program   · Exercise for at least 30 minutes 5 days a week  Start slowly  Set aside time each day for physical activity that you enjoy and that is convenient for you  It is best to do both weight training and an activity that increases your heart rate, such as walking, bicycling, or swimming  · Find ways to be more active  Do yard work and housecleaning  Walk up the stairs instead of using elevators  Spend your leisure time going to events that require walking, such as outdoor festivals or fairs  This extra physical activity can help you lose weight and keep it off  Follow up with your healthcare provider as directed: You may need to meet with a dietitian  Write down your questions so you remember to ask them during your visits  © Copyright [address] Information is for End User's use only and may not be sold, redistributed or otherwise used for commercial purposes   All illustrations and images included in CareNotes® are the copyrighted property of [employer]  or [address]   The above information is an  only  It is not intended as medical advice for individual conditions or treatments  Talk to your doctor, nurse or pharmacist before following any medical regimen to see if it is safe and effective for you  Heart Healthy Diet   AMBULATORY CARE:   A heart healthy diet  is an eating plan low in unhealthy fats and sodium (salt)  The plan is high in healthy fats and fiber  A heart healthy diet helps improve your cholesterol levels and lowers your risk for heart disease and stroke  A dietitian will teach you how to read and understand food labels  Heart healthy diet guidelines to follow:   · Choose foods that contain healthy fats  ? Unsaturated fats  include monounsaturated and polyunsaturated fats  Unsaturated fat is found in foods such as soybean, canola, olive, corn, and safflower oils  It is also found in soft tub margarine that is made with liquid vegetable oil  ? Omega-3 fat  is found in certain fish, such as salmon, tuna, and trout, and in walnuts and flaxseed  Eat fish high in omega-3 fats at least 2 times a week  · Get 20 to 30 grams of fiber each day  Fruits, vegetables, whole-grain foods, and legumes (cooked beans) are good sources of fiber  · Limit or do not have unhealthy fats  ? Cholesterol  is found in animal foods, such as eggs and lobster, and in dairy products made from whole milk  Limit cholesterol to less than 200 mg each day  ? Saturated fat  is found in meats, such as tariq and hamburger  It is also found in chicken or turkey skin, whole milk, and butter  Limit saturated fat to less than 7% of your total daily calories  ? Trans fat  is found in packaged foods, such as potato chips and cookies  It is also in hard margarine, some fried foods, and shortening  Do not eat foods that contain trans fats  · Limit sodium as directed    You may be told to limit sodium to 2,000 to 2,300 mg each day  Choose low-sodium or no-salt-added foods  Add little or no salt to food you prepare  Use herbs and spices in place of salt  Include the following in your heart healthy plan:  Ask your dietitian or healthcare provider how many servings to have from each of the following food groups:  · Grains:      ? Whole-wheat breads, cereals, and pastas, and brown rice    ? Low-fat, low-sodium crackers and chips    · Vegetables:      ? Broccoli, green beans, green peas, and spinach    ? Collards, kale, and lima beans    ? Carrots, sweet potatoes, tomatoes, and peppers    ? Canned vegetables with no salt added    · Fruits:      ? Bananas, peaches, pears, and pineapple    ? Grapes, raisins, and dates    ? Oranges, tangerines, grapefruit, orange juice, and grapefruit juice    ? Apricots, mangoes, melons, and papaya    ? Raspberries and strawberries    ? Canned fruit with no added sugar    · Low-fat dairy:      ? Nonfat (skim) milk, 1% milk, and low-fat almond, cashew, or soy milks fortified with calcium    ? Low-fat cheese, regular or frozen yogurt, and cottage cheese    · Meats and proteins:      ? Lean cuts of beef and pork (loin, leg, round), skinless chicken and turkey    ? Legumes, soy products, egg whites, or nuts    Limit or do not include the following in your heart healthy plan:   · Unhealthy fats and oils:      ? Whole or 2% milk, cream cheese, sour cream, or cheese    ? High-fat cuts of beef (T-bone steaks, ribs), chicken or turkey with skin, and organ meats such as liver    ? Butter, stick margarine, shortening, and cooking oils such as coconut or palm oil    · Foods and liquids high in sodium:      ? Packaged foods, such as frozen dinners, cookies, macaroni and cheese, and cereals with more than 300 mg of sodium per serving    ? Vegetables with added sodium, such as instant potatoes, vegetables with added sauces, or regular canned vegetables    ?  Cured or smoked meats, such as hot dogs, tariq, and sausage    ? High-sodium ketchup, barbecue sauce, salad dressing, pickles, olives, soy sauce, or miso    · Foods and liquids high in sugar:      ? Candy, cake, cookies, pies, or doughnuts    ? Soft drinks (soda), sports drinks, or sweetened tea    ? Canned or dry mixes for cakes, soups, sauces, or gravies    Other healthy heart guidelines:   · Do not smoke  Nicotine and other chemicals in cigarettes and cigars can cause lung and heart damage  Ask your healthcare provider for information if you currently smoke and need help to quit  E-cigarettes or smokeless tobacco still contain nicotine  Talk to your healthcare provider before you use these products  · Limit or do not drink alcohol as directed  Alcohol can damage your heart and raise your blood pressure  Your healthcare provider may give you specific daily and weekly limits  The general recommended limit is 1 drink a day for women 21 or older and for men 72 or older  Do not have more than 3 drinks in a day or 7 in a week  The recommended limit is 2 drinks a day for men 24to 59years of age  Do not have more than 4 drinks in a day or 14 in a week  A drink of alcohol is 12 ounces of beer, 5 ounces of wine, or 1½ ounces of liquor  · Exercise regularly  Exercise can help you maintain a healthy weight and improve your blood pressure and cholesterol levels  Regular exercise can also decrease your risk for heart problems  Ask your healthcare provider about the best exercise plan for you  Do not start an exercise program without asking your healthcare provider  Follow up with your doctor or cardiologist as directed:  Write down your questions so you remember to ask them during your visits  © Copyright [address] Information is for End User's use only and may not be sold, redistributed or otherwise used for commercial purposes   All illustrations and images included in CareNotes® are the copyrighted property of [employer]  or [address]  The above information is an  only  It is not intended as medical advice for individual conditions or treatments  Talk to your doctor, nurse or pharmacist before following any medical regimen to see if it is safe and effective for you

## 2021-03-08 NOTE — ASSESSMENT & PLAN NOTE
Weight management was discussed again  She has tried to lose weight her entire life and has not been able to do so  Even 5 lbs weight loss would be good

## 2021-03-08 NOTE — ASSESSMENT & PLAN NOTE
Wt Readings from Last 3 Encounters:   03/08/21 99 3 kg (219 lb)   12/28/20 98 kg (216 lb)   11/23/20 97 5 kg (215 lb)     Keep f/u appt with Dr Janel Cowan   Stay on current meds

## 2021-03-08 NOTE — PROGRESS NOTES
Assessment/Plan:     Chronic Problems:  Chronic diastolic (congestive) heart failure (HCC)  Wt Readings from Last 3 Encounters:   03/08/21 99 3 kg (219 lb)   12/28/20 98 kg (216 lb)   11/23/20 97 5 kg (215 lb)     Keep f/u appt with Dr Veronica Jordan  Stay on current meds        Obesity, morbid ([facility] )  Weight management was discussed again  She has tried to lose weight her entire life and has not been able to do so  Even 5 lbs weight loss would be good  S/P TAVR (transcatheter aortic valve replacement)  SOB somewhat better since last visit  Depression, recurrent ([facility] )  Stay on current meds  Pt is still grieving about the loss 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  Hyperlipidemia  Cholesterol is at goal   Continue current medications  Hypertension    BP is slightly elevated today continue current medications and no added salt in the diet  Advised to work on at least 5 lb of weight loss  Visit Diagnosis:  Diagnoses and all orders for this visit:    Chronic diastolic (congestive) heart failure (HCC)    Obesity, morbid (HCC)    S/P TAVR (transcatheter aortic valve replacement)    Osteopenia, with elevated FRAX score  -     alendronate (Fosamax) 70 mg tablet; Take 1 tablet (70 mg total) by mouth every 7 days    Chronic obstructive pulmonary disease, unspecified COPD type ([place] [id number] )  Comments:  Stay on current meds  Depression, recurrent ([facility] )    Mixed hyperlipidemia    Essential hypertension    Morbid obesity with BMI of 50 0-59 9, adult (HCC)          Subjective:    Patient ID: Gordo De Jesus is a 79 y o  female  Pt is here for routine f/u appt  Sleeps with cpap and oxygen  Now on 2 l n/, but rarely uses it at home  Pt is refusing covid shot and is aware of potential complications  Had labs done and here to discuss results  Pt needs renewals on meds  Still with sob but otherwise no complaints  Follows with Dr Martin Began  Takes all other meds as directed  No side effects noted         The following portions of the patient's history were reviewed and updated as appropriate: allergies, current medications, past family history, past medical history, past social history, past surgical history and problem list     Review of Systems   Constitutional: Negative for chills, diaphoresis, fatigue and fever  HENT: Negative  Ear is better but she still keeps picking at in  Uses cotton balls in the ars  Has f/u with Dr Chanda Gordon  Eyes: Negative  Respiratory: Positive for shortness of breath  Negative for cough and wheezing  Cardiovascular: Negative for chest pain and palpitations  Gastrointestinal: Negative for abdominal pain, constipation, diarrhea, nausea and vomiting  Genitourinary: Negative for dysuria, frequency and urgency  Musculoskeletal: Negative for arthralgias and myalgias  Neurological: Negative for dizziness, light-headedness and headaches  Psychiatric/Behavioral: The patient is not nervous/anxious  Still feels a loss 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  Thinks she is doing ok on her meds            /60   Pulse 62   Temp 97 9 °F (36 6 °C)   Resp 18   Ht 4' 6" (1 372 m)   Wt 99 3 kg (219 lb)   SpO2 94% Comment: 2 Liters of O2  BMI 52 80 kg/m²   Social History     Socioeconomic History    Marital status: Single     Spouse name: Not on file    Number of children: Not on file    Years of education: Not on file    Highest education level: Not on file   Occupational History    Not on file   Social Needs    Financial resource strain: Not on file    Food insecurity     Worry: Not on file     Inability: Not on file    Transportation needs     Medical: Not on file     Non-medical: Not on file   Tobacco Use    Smoking status: Never Smoker    Smokeless tobacco: Never Used   Substance and Sexual Activity    Alcohol use: No    Drug use: No    Sexual activity: Never   Lifestyle    Physical activity     Days per week: Not on file     Minutes per session: Not on file    Stress: Not on file   Relationships    Social connections     Talks on phone: Not on file     Gets together: Not on file     Attends Buddhist service: Not on file     Active member of club or organization: Not on file     Attends meetings of clubs or organizations: Not on file     Relationship status: Not on file    Intimate partner violence     Fear of current or ex partner: Not on file     Emotionally abused: Not on file     Physically abused: Not on file     Forced sexual activity: Not on file   Other Topics Concern    Not on file   Social History Narrative    Denied drinking coffee    Denied exercise habits    Most recent tobacco use screenin2018      Do you currently or have you served in [employer]:   No      Were you activated, into active duty, as a member of the [employer] or as a Reservist:   No          Past Medical History:   Diagnosis Date    Anemia 2018    Anxiety     Arthritis     AVB (atrioventricular block)     first degree    Cataract     CHF (congestive [facility] failure) [facility] )     COPD, mild ([facility] )     Coronary artery disease     Dislocation of right shoulder joint     Frequent UTI     GERD (gastroesophageal reflux disease)     H/O: pneumonia     Heme positive stool     Hyperlipidemia     Hypertension     Hypothyroidism     Morbid obesity with BMI of 50 0-59 9, adult ([facility] )     Obesity, morbid ([facility] ) 2018    JANICE on CPAP     Pulmonary hypertension ([facility] ) 2018    Severe aortic stenosis     Simple goiter     Skin cyst     within the armpits, right    Wears glasses      Family History   Problem Relation Age of Onset    Diabetes Mother     Stroke Mother     Cancer Father     Lung cancer Father     Diabetes Sister     Heart disease Sister     Hypertension Sister     Coronary artery disease Family     Diabetes Family     Hypertension Family     Cancer Family     Stroke Family     Thyroid disease Neg Hx      Past Surgical History:   Procedure Laterality Date    BREAST BIOPSY  CARDIAC CATHETERIZATION      CARPAL TUNNEL RELEASE Bilateral     CHOLECYSTECTOMY      DILATION AND CURETTAGE OF UTERUS      HYSTEROSCOPY      MASTOID SURGERY      TN COLONOSCOPY FLX DX W/COLLJ SPEC WHEN PFRMD N/A 9/6/2018    Procedure: COLONOSCOPY;  Surgeon: Timothy Perla MD;  [facility]; Service: Gastroenterology    TN ECHO TRANSESOPHAG R-T 2D W/PRB IMG ACQUISJ I&R N/A 10/9/2018    Procedure: INTRA-OP TRANSESOPHAGEAL ECHOCARDIOGRAM (GARRISON); Surgeon: Rola Huffman DO;  [facility];  Service: Cardiac Surgery    TN ESOPHAGOGASTRODUODENOSCOPY TRANSORAL DIAGNOSTIC N/A 8/31/2018    Procedure: ESOPHAGOGASTRODUODENOSCOPY (EGD); Surgeon: Timothy Perla MD;  [facility]: [facility]; Service: Gastroenterology    TN REPLACE AORTIC VALVE OPENFEMORAL ARTERY APPROACH N/A 10/9/2018    Procedure: REPLACEMENT AORTIC VALVE TRANSCATHETER (TAVR) TRANSFEMORAL W/ 23 MM MENDOZA NOE S3 VALVE (ACCESS OF LEFT);   Surgeon: Rola Huffman DO;  [facility];  Service: Cardiac Surgery    TOTAL HIP ARTHROPLASTY Left 2007    TOTAL KNEE ARTHROPLASTY Bilateral        Current Outpatient Medications:     albuterol (2 5 mg/3 mL) 0 083 % nebulizer solution, Take 1 vial (2 5 mg total) by nebulization every 6 (six) hours as needed for wheezing or shortness of breath, Disp: 360 mL, Rfl: 1    alendronate (Fosamax) 70 mg tablet, Take 1 tablet (70 mg total) by mouth every 7 days, Disp: 4 tablet, Rfl: 5    aspirin (ECOTRIN LOW STRENGTH) 81 mg EC tablet, Take 1 tablet (81 mg total) by mouth daily, Disp: 100 tablet, Rfl: 0    b complex vitamins capsule, Take 1 capsule by mouth 2 (two) times a day  , Disp: , Rfl:     Calcium Carb-Cholecalciferol (CALCIUM 600 + D PO), Take 1 tablet by mouth 2 (two) times a day, Disp: , Rfl:     Fesoterodine Fumarate ER (Toviaz) 8 MG TB24, Take 8 mg by mouth daily , Disp: , Rfl:     fluticasone (FLONASE) 50 mcg/act nasal spray, SPRAY 2 SPRAYS INTO EACH NOSTRIL EVERY DAY, Disp: , Rfl: 0    fluticasone-salmeterol (Wixela Inhub) 250-50 mcg/dose inhaler, Inhale 1 puff 2 (two) times a day Rinse mouth after use , Disp: 1 Inhaler, Rfl: 5    levothyroxine 50 mcg tablet, TAKE 1 TABLET BY MOUTH EVERY DAY, Disp: 90 tablet, Rfl: 1    loratadine (CLARITIN) 10 mg tablet, TAKE 1 TABLET BY MOUTH EVERY DAY, Disp: 90 tablet, Rfl: 1    metoprolol succinate (TOPROL-XL) 25 mg 24 hr tablet, TAKE 1 TABLET BY MOUTH EVERY DAY, Disp: 90 tablet, Rfl: 3    olmesartan (BENICAR) 5 mg tablet, TAKE 1 TABLET BY MOUTH EVERY DAY, Disp: 90 tablet, Rfl: 1    omeprazole (PriLOSEC) 40 MG capsule, TAKE 1 CAPSULE BY MOUTH TWICE A DAY, Disp: 180 capsule, Rfl: 1    sertraline (ZOLOFT) 100 mg tablet, TAKE 1 TABLET BY MOUTH EVERY DAY, Disp: 90 tablet, Rfl: 0    simvastatin (ZOCOR) 40 mg tablet, TAKE 1 TABLET BY MOUTH DAILY AT BEDTIME, Disp: 90 tablet, Rfl: 1    albuterol (PROVENTIL HFA,VENTOLIN HFA) 90 mcg/act inhaler, Inhale 2 puffs every 6 (six) hours as needed for wheezing (Patient not taking: Reported on 3/8/2021), Disp: 1 Inhaler, Rfl: 3    clotrimazole-betamethasone (LOTRISONE) 1-0 05 % cream, Apply topically 2 (two) times a day, Disp: 30 g, Rfl: 0    Cranberry 250 MG TABS, Take by mouth, Disp: , Rfl:     furosemide (LASIX) 20 mg tablet, Take 1 tablet (20 mg total) by mouth daily (Patient not taking: Reported on 3/8/2021), Disp: 90 tablet, Rfl: 3    Allergies   Allergen Reactions    Latex Rash    Neosporin [Neomycin-Bacitracin Zn-Polymyx] Rash and Other (See Comments)     hives per [facility] order          Lab Review   Lab on 02/17/2021   Component Date Value    Sodium 02/17/2021 139     Potassium 02/17/2021 5 0     Chloride 02/17/2021 103     CO2 02/17/2021 31     ANION GAP 02/17/2021 5     BUN 02/17/2021 27*    Creatinine 02/17/2021 0 99     Glucose, Fasting 02/17/2021 82     Calcium 02/17/2021 9 6     Corrected Calcium 02/17/2021 10 1     AST 02/17/2021 30     ALT 02/17/2021 28     Alkaline Phosphatase 02/17/2021 73     Total Protein 02/17/2021 7 6     Albumin 02/17/2021 3 4*    Total Bilirubin 02/17/2021 0 40     eGFR 02/17/2021 54     Cholesterol 02/17/2021 160     Triglycerides 02/17/2021 92     HDL, Direct 02/17/2021 72     LDL Calculated 02/17/2021 70     Non-HDL-Chol (CHOL-HDL) 02/17/2021 88     TSH 3RD GENERATON 02/17/2021 1 871         Imaging: No results found  Objective:     Physical Exam  Constitutional:       General: She is not in acute distress  Appearance: Normal appearance  She is obese  She is not ill-appearing, toxic-appearing or diaphoretic  HENT:      Head: Normocephalic and atraumatic  Right Ear: Tympanic membrane, ear canal and external ear normal       Ears:      Comments: Left tm reconstructed now with small hole over the reconstruction  Pt admits to picking at the ear  Nose: Nose normal       Mouth/Throat:      Mouth: Mucous membranes are moist       Pharynx: No oropharyngeal exudate  Eyes:      General:         Right eye: No discharge  Left eye: No discharge  Pupils: Pupils are equal, round, and reactive to light  Neck:      Musculoskeletal: Normal range of motion and neck supple  Cardiovascular:      Rate and Rhythm: Normal rate and regular rhythm  Heart sounds: Murmur (Grade 1/6) present  Pulmonary:      Effort: Pulmonary effort is normal  No respiratory distress  Breath sounds: Normal breath sounds  No wheezing or rales  Comments: Pt on 2 l n/c  Abdominal:      Comments: Pt is morbidly obese  Ambulates with a walker  Abdomen is pendulous  Musculoskeletal: Normal range of motion  General: No swelling  Right lower leg: No edema  Left lower leg: No edema  Skin:     General: Skin is warm and dry  Neurological:      General: No focal deficit present  Mental Status: She is alert and oriented to person, place, and time     Psychiatric:         Mood and Affect: Mood normal  Behavior: Behavior normal          Thought Content: Thought content normal          Judgment: Judgment normal            Patient Instructions       Reviewed all with patient  Blood pressure is slightly elevated today  Try to cut back on salt work on the diet even 5 lb will help us  Your cholesterol is perfect your kidney function is stable  I strongly suggest we get the COVID vaccine as you are at risk for a bad outcome from this virus  Stay on your current medications keep the follow-up appointment with your cardiologist   Use your oxygen and inhalers as needed  I did tell patient if she gets the COVID shot and gets sick from it more than 1 day I will make a house call to her house to look in on her  Obesity   AMBULATORY CARE:   Obesity  is when your body mass index (BMI) is greater than 30  Your healthcare provider will use your height and weight to measure your BMI  The risks of obesity include  many health problems, such as injuries or physical disability  You may need tests to check for the following:  · Diabetes    · High blood pressure or high cholesterol    · Heart disease    · Gallbladder or liver disease    · Cancer of the colon, breast, prostate, liver, or kidney    · Sleep apnea    · Arthritis or gout    Seek care immediately if:   · You have a severe headache, confusion, or difficulty speaking  · You have weakness on one side of your body  · You have chest pain, sweating, or shortness of breath  Contact your healthcare provider if:   · You have symptoms of gallbladder or liver disease, such as pain in your upper abdomen  · You have knee or hip pain and discomfort while walking  · You have symptoms of diabetes, such as intense hunger and thirst, and frequent urination  · You have symptoms of sleep apnea, such as snoring or daytime sleepiness  · You have questions or concerns about your condition or care      Treatment for obesity  focuses on helping you lose weight to improve your health  Even a small decrease in BMI can reduce the risk for many health problems  Your healthcare provider will help you set a weight-loss goal   · Lifestyle changes  are the first step in treating obesity  These include making healthy food choices and getting regular physical activity  Your healthcare provider may suggest a weight-loss program that involves coaching, education, and therapy  · Medicine  may help you lose weight when it is used with a healthy diet and physical activity  · Surgery  can help you lose weight if you are very obese and have other health problems  There are several types of weight-loss surgery  Ask your healthcare provider for more information  Be successful losing weight:   · Set small, realistic goals  An example of a small goal is to walk for 20 minutes 5 days a week  Anther goal is to lose 5% of your body weight  · Tell friends, family members, and coworkers about your goals  and ask for their support  Ask a friend to lose weight with you, or join a weight-loss support group  · Identify foods or triggers that may cause you to overeat , and find ways to avoid them  Remove tempting high-calorie foods from your home and workplace  Place a bowl of fresh fruit on your kitchen counter  If stress causes you to eat, then find other ways to cope with stress  · Keep a diary to track what you eat and drink  Also write down how many minutes of physical activity you do each day  Weigh yourself once a week and record it in your diary  Eating changes: You will need to eat 500 to 1,000 fewer calories each day than you currently eat to lose 1 to 2 pounds a week  The following changes will help you cut calories:  · Eat smaller portions  Use small plates, no larger than 9 inches in diameter  Fill your plate half full of fruits and vegetables  Measure your food using measuring cups until you know what a serving size looks like           · Eat 3 meals and 1 or 2 snacks each day   Plan your meals in advance  Divina Jeter and eat at home most of the time  Eat slowly  Do not skip meals  Skipping meals can lead to overeating later in the day  This can make it harder for you to lose weight  Talk with a dietitian to help you make a meal plan and schedule that is right for you  · Eat fruits and vegetables at every meal   They are low in calories and high in fiber, which makes you feel full  Do not add butter, margarine, or cream sauce to vegetables  Use herbs to season steamed vegetables  · Eat less fat and fewer fried foods  Eat more baked or grilled chicken and fish  These protein sources are lower in calories and fat than red meat  Limit fast food  Dress your salads with olive oil and vinegar instead of bottled dressing  · Limit the amount of sugar you eat  Do not drink sugary beverages  Limit alcohol  Activity changes:  Physical activity is good for your body in many ways  It helps you burn calories and build strong muscles  It decreases stress and depression, and improves your mood  It can also help you sleep better  Talk to your healthcare provider before you begin an exercise program   · Exercise for at least 30 minutes 5 days a week  Start slowly  Set aside time each day for physical activity that you enjoy and that is convenient for you  It is best to do both weight training and an activity that increases your heart rate, such as walking, bicycling, or swimming  · Find ways to be more active  Do yard work and housecleaning  Walk up the stairs instead of using elevators  Spend your leisure time going to events that require walking, such as outdoor festivals or fairs  This extra physical activity can help you lose weight and keep it off  Follow up with your healthcare provider as directed: You may need to meet with a dietitian  Write down your questions so you remember to ask them during your visits    © Copyright [employer] 2020 Information is for End User's use only and may not be sold, redistributed or otherwise used for commercial purposes  All illustrations and images included in CareNotes® are the copyrighted property of A D A M [employer]  or Ame Mcgill  The above information is an  only  It is not intended as medical advice for individual conditions or treatments  Talk to your doctor, nurse or pharmacist before following any medical regimen to see if it is safe and effective for you  Heart Healthy Diet   AMBULATORY CARE:   A heart healthy diet  is an eating plan low in unhealthy fats and sodium (salt)  The plan is high in healthy fats and fiber  A heart healthy diet helps improve your cholesterol levels and lowers your risk for heart disease and stroke  A dietitian will teach you how to read and understand food labels  Heart healthy diet guidelines to follow:   · Choose foods that contain healthy fats  ? Unsaturated fats  include monounsaturated and polyunsaturated fats  Unsaturated fat is found in foods such as soybean, canola, olive, corn, and safflower oils  It is also found in soft tub margarine that is made with liquid vegetable oil  ? Omega-3 fat  is found in certain fish, such as salmon, tuna, and trout, and in walnuts and flaxseed  Eat fish high in omega-3 fats at least 2 times a week  · Get 20 to 30 grams of fiber each day  Fruits, vegetables, whole-grain foods, and legumes (cooked beans) are good sources of fiber  · Limit or do not have unhealthy fats  ? Cholesterol  is found in animal foods, such as eggs and lobster, and in dairy products made from whole milk  Limit cholesterol to less than 200 mg each day  ? Saturated fat  is found in meats, such as tariq and hamburger  It is also found in chicken or turkey skin, whole milk, and butter  Limit saturated fat to less than 7% of your total daily calories  ? Trans fat  is found in packaged foods, such as potato chips and cookies   It is also in hard margarine, some fried foods, and shortening  Do not eat foods that contain trans fats  · Limit sodium as directed  You may be told to limit sodium to 2,000 to 2,300 mg each day  Choose low-sodium or no-salt-added foods  Add little or no salt to food you prepare  Use herbs and spices in place of salt  Include the following in your heart healthy plan:  Ask your dietitian or healthcare provider how many servings to have from each of the following food groups:  · Grains:      ? Whole-wheat breads, cereals, and pastas, and brown rice    ? Low-fat, low-sodium crackers and chips    · Vegetables:      ? Broccoli, green beans, green peas, and spinach    ? Collards, kale, and lima beans    ? Carrots, sweet potatoes, tomatoes, and peppers    ? Canned vegetables with no salt added    · Fruits:      ? Bananas, peaches, pears, and pineapple    ? Grapes, raisins, and dates    ? Oranges, tangerines, grapefruit, orange juice, and grapefruit juice    ? Apricots, mangoes, melons, and papaya    ? Raspberries and strawberries    ? Canned fruit with no added sugar    · Low-fat dairy:      ? Nonfat (skim) milk, 1% milk, and low-fat almond, cashew, or soy milks fortified with calcium    ? Low-fat cheese, regular or frozen yogurt, and cottage cheese    · Meats and proteins:      ? Lean cuts of beef and pork (loin, leg, round), skinless chicken and turkey    ? Legumes, soy products, egg whites, or nuts    Limit or do not include the following in your heart healthy plan:   · Unhealthy fats and oils:      ? Whole or 2% milk, cream cheese, sour cream, or cheese    ? High-fat cuts of beef (T-bone steaks, ribs), chicken or turkey with skin, and organ meats such as liver    ?  Butter, stick margarine, shortening, and cooking oils such as coconut or palm oil    · Foods and liquids high in sodium:      ? Packaged foods, such as frozen dinners, cookies, macaroni and cheese, and cereals with more than 300 mg of sodium per serving    ? Vegetables with added sodium, such as instant potatoes, vegetables with added sauces, or regular canned vegetables    ? Cured or smoked meats, such as hot dogs, tariq, and sausage    ? High-sodium ketchup, barbecue sauce, salad dressing, pickles, olives, soy sauce, or miso    · Foods and liquids high in sugar:      ? Candy, cake, cookies, pies, or doughnuts    ? Soft drinks (soda), sports drinks, or sweetened tea    ? Canned or dry mixes for cakes, soups, sauces, or gravies    Other healthy heart guidelines:   · Do not smoke  Nicotine and other chemicals in cigarettes and cigars can cause lung and heart damage  Ask your healthcare provider for information if you currently smoke and need help to quit  E-cigarettes or smokeless tobacco still contain nicotine  Talk to your healthcare provider before you use these products  · Limit or do not drink alcohol as directed  Alcohol can damage your heart and raise your blood pressure  Your healthcare provider may give you specific daily and weekly limits  The general recommended limit is 1 drink a day for women 21 or older and for men 72 or older  Do not have more than 3 drinks in a day or 7 in a week  The recommended limit is 2 drinks a day for men 24to 59years of age  Do not have more than 4 drinks in a day or 14 in a week  A drink of alcohol is 12 ounces of beer, 5 ounces of wine, or 1½ ounces of liquor  · Exercise regularly  Exercise can help you maintain a healthy weight and improve your blood pressure and cholesterol levels  Regular exercise can also decrease your risk for heart problems  Ask your healthcare provider about the best exercise plan for you  Do not start an exercise program without asking your healthcare provider  Follow up with your doctor or cardiologist as directed:  Write down your questions so you remember to ask them during your visits    © Copyright [employer] 2020 Information is for End User's use only and may not be sold, redistributed or otherwise used for commercial purposes  All illustrations and images included in CareNotes® are the copyrighted property of [employer]  or Ame Mcgill  The above information is an  only  It is not intended as medical advice for individual conditions or treatments  Talk to your doctor, nurse or pharmacist before following any medical regimen to see if it is safe and effective for you  MABEL Castro    Portions of the record may have been created with voice recognition software  Occasional wrong word or "sound a like" substitutions may have occurred due to the inherent limitations of voice recognition software  Read the chart carefully and recognize, using context, where substitutions have occurred  BMI Counseling: Body mass index is 52 8 kg/m²  The BMI is above normal  Nutrition recommendations include reducing portion sizes, 3-5 servings of fruits/vegetables daily and consuming healthier snacks  Exercise recommendations include strength training exercises

## 2021-03-08 NOTE — ASSESSMENT & PLAN NOTE
BP is slightly elevated today continue current medications and no added salt in the diet  Advised to work on at least 5 lb of weight loss

## 2021-03-09 ENCOUNTER — TELEPHONE (OUTPATIENT)
Dept: FAMILY MEDICINE CLINIC | Facility: CLINIC | Age: 81
End: 2021-03-09

## 2021-03-09 NOTE — TELEPHONE ENCOUNTER
Pt called to just inform us that she will be getting her Covid-19 vaccine through the pharmacy because they are able to get her scheduled quicker

## 2021-03-15 DIAGNOSIS — I35.0 AORTIC STENOSIS, SEVERE: ICD-10-CM

## 2021-03-15 DIAGNOSIS — Z95.2 S/P TAVR (TRANSCATHETER AORTIC VALVE REPLACEMENT): ICD-10-CM

## 2021-03-15 RX ORDER — SIMVASTATIN 40 MG
TABLET ORAL
Qty: 90 TABLET | Refills: 1 | Status: SHIPPED | OUTPATIENT
Start: 2021-03-15 | End: 2021-09-28

## 2021-03-16 ENCOUNTER — IMMUNIZATIONS (OUTPATIENT)
Dept: FAMILY MEDICINE CLINIC | Facility: HOSPITAL | Age: 81
End: 2021-03-16

## 2021-03-16 DIAGNOSIS — Z23 ENCOUNTER FOR IMMUNIZATION: Primary | ICD-10-CM

## 2021-03-16 PROCEDURE — 0001A SARS-COV-2 / COVID-19 MRNA VACCINE (PFIZER-BIONTECH) 30 MCG: CPT

## 2021-03-16 PROCEDURE — 91300 SARS-COV-2 / COVID-19 MRNA VACCINE (PFIZER-BIONTECH) 30 MCG: CPT

## 2021-03-31 DIAGNOSIS — E03.9 ACQUIRED HYPOTHYROIDISM: ICD-10-CM

## 2021-03-31 RX ORDER — LEVOTHYROXINE SODIUM 0.05 MG/1
TABLET ORAL
Qty: 90 TABLET | Refills: 1 | Status: SHIPPED | OUTPATIENT
Start: 2021-03-31 | End: 2021-09-28

## 2021-04-01 DIAGNOSIS — J30.89 ENVIRONMENTAL AND SEASONAL ALLERGIES: ICD-10-CM

## 2021-04-01 RX ORDER — LORATADINE 10 MG/1
TABLET ORAL
Qty: 90 TABLET | Refills: 1 | Status: SHIPPED | OUTPATIENT
Start: 2021-04-01 | End: 2021-09-28

## 2021-04-07 ENCOUNTER — IMMUNIZATIONS (OUTPATIENT)
Dept: FAMILY MEDICINE CLINIC | Facility: HOSPITAL | Age: 81
End: 2021-04-07

## 2021-04-07 DIAGNOSIS — Z23 ENCOUNTER FOR IMMUNIZATION: Primary | ICD-10-CM

## 2021-04-07 PROCEDURE — 91300 SARS-COV-2 / COVID-19 MRNA VACCINE (PFIZER-BIONTECH) 30 MCG: CPT

## 2021-04-07 PROCEDURE — 0002A SARS-COV-2 / COVID-19 MRNA VACCINE (PFIZER-BIONTECH) 30 MCG: CPT

## 2021-04-28 DIAGNOSIS — F41.8 DEPRESSION WITH ANXIETY: ICD-10-CM

## 2021-04-28 RX ORDER — SERTRALINE HYDROCHLORIDE 100 MG/1
TABLET, FILM COATED ORAL
Qty: 90 TABLET | Refills: 0 | Status: SHIPPED | OUTPATIENT
Start: 2021-04-28 | End: 2021-08-01

## 2021-05-04 ENCOUNTER — TELEPHONE (OUTPATIENT)
Dept: FAMILY MEDICINE CLINIC | Facility: CLINIC | Age: 81
End: 2021-05-04

## 2021-05-04 DIAGNOSIS — Z29.8 INDICATION PRESENT FOR ENDOCARDITIS PROPHYLAXIS: Primary | ICD-10-CM

## 2021-05-04 RX ORDER — AMOXICILLIN 500 MG/1
CAPSULE ORAL
Qty: 28 CAPSULE | Refills: 0 | Status: SHIPPED | OUTPATIENT
Start: 2021-05-04 | End: 2021-06-10 | Stop reason: ALTCHOICE

## 2021-06-10 ENCOUNTER — OFFICE VISIT (OUTPATIENT)
Dept: FAMILY MEDICINE CLINIC | Facility: CLINIC | Age: 81
End: 2021-06-10
Payer: MEDICARE

## 2021-06-10 VITALS
BODY MASS INDEX: 51.42 KG/M2 | OXYGEN SATURATION: 90 % | WEIGHT: 222.2 LBS | SYSTOLIC BLOOD PRESSURE: 140 MMHG | DIASTOLIC BLOOD PRESSURE: 70 MMHG | HEART RATE: 67 BPM | TEMPERATURE: 98.9 F | HEIGHT: 55 IN | RESPIRATION RATE: 18 BRPM

## 2021-06-10 DIAGNOSIS — E66.01 MORBID OBESITY WITH BMI OF 50.0-59.9, ADULT (HCC): ICD-10-CM

## 2021-06-10 DIAGNOSIS — M25.552 LEFT HIP PAIN: ICD-10-CM

## 2021-06-10 DIAGNOSIS — I10 ESSENTIAL HYPERTENSION: Primary | Chronic | ICD-10-CM

## 2021-06-10 DIAGNOSIS — I50.32 CHRONIC DIASTOLIC (CONGESTIVE) HEART FAILURE (HCC): ICD-10-CM

## 2021-06-10 DIAGNOSIS — Z91.81 AT RISK FOR FALLS: ICD-10-CM

## 2021-06-10 DIAGNOSIS — J96.11 CHRONIC HYPOXEMIC RESPIRATORY FAILURE (HCC): ICD-10-CM

## 2021-06-10 PROCEDURE — 99214 OFFICE O/P EST MOD 30 MIN: CPT | Performed by: FAMILY MEDICINE

## 2021-06-10 NOTE — PATIENT INSTRUCTIONS
Reviewed all with patient  Blood pressure is acceptable today  Have the x-ray done of the left hip I will call with results  Try to continue to work on the weight  The best thing you could do for you self at this point is make sure you are drinking at least 6 glasses of water a day  Cut your portions in half  What you would normally eat only eat half of  Cut back on carbohydrates like bread pasta rice cereal potatoes cakes and cookies  Keep your follow-up appointment with all your specialist I am putting in a referral for your cardiologist as you have not seen him for a while  If you do not hear from their office within a week with an appointment date please call here and let us know  Follow-up here in about 2 months for annual wellness exam     Fall Prevention   AMBULATORY CARE:   Fall prevention  includes ways to make your home and other areas safer  It also includes ways you can move more carefully to prevent a fall  Health conditions that cause changes in your blood pressure, vision, or muscle strength and coordination may increase your risk for falls  Medicines may also increase your risk for falls if they make you dizzy, weak, or sleepy  Call 911 or have someone else call if:   · You have fallen and are unconscious  · You have fallen and cannot move part of your body  Contact your healthcare provider if:   · You have fallen and have pain or a headache  · You have questions or concerns about your condition or care  Fall prevention tips:   · Stand or sit up slowly  This may help you keep your balance and prevent falls  · Use assistive devices as directed  Your healthcare provider may suggest that you use a cane or walker to help you keep your balance  You may need to have grab bars put in your bathroom near the toilet or in the shower  · Wear shoes that fit well and have soles that   Wear shoes both inside and outside  Use slippers with good    Do not wear shoes with high heels     · Wear a personal alarm  This is a device that allows you to call 911 if you fall and need help  Ask your healthcare provider for more information  · Stay active  Exercise can help strengthen your muscles and improve your balance  Your healthcare provider may recommend water aerobics or walking  He or she may also recommend physical therapy to improve your coordination  Never start an exercise program without talking to your healthcare provider first          · Manage your medical conditions  Keep all appointments with your healthcare providers  Visit your eye doctor as directed  Home safety tips:       · Add items to prevent falls in the bathroom  Put nonslip strips on your bath or shower floor to prevent you from slipping  Use a bath mat if you do not have carpet in the bathroom  This will prevent you from falling when you step out of the bath or shower  Use a shower seat so you do not need to stand while you shower  Sit on the toilet or a chair in your bathroom to dry yourself and put on clothing  This will prevent you from losing your balance from drying or dressing yourself while you are standing  · Keep paths clear  Remove books, shoes, and other objects from walkways and stairs  Place cords for telephones and lamps out of the way so that you do not need to walk over them  Tape them down if you cannot move them  Remove small rugs  If you cannot remove a rug, secure it with double-sided tape  This will prevent you from tripping  · Install bright lights in your home  Use night lights to help light paths to the bathroom or kitchen  Always turn on the light before you start walking  · Keep items you use often on shelves within reach  Do not use a step stool to help you reach an item  · Paint or place reflective tape on the edges of your stairs  This will help you see the stairs better    Follow up with your healthcare provider as directed:  Write down your questions so you remember to ask them during your visits  © Copyright [address] Information is for End User's use only and may not be sold, redistributed or otherwise used for commercial purposes  All illustrations and images included in CareNotes® are the copyrighted property of A D A M , Inc  or Ame Mcgill  The above information is an  only  It is not intended as medical advice for individual conditions or treatments  Talk to your doctor, nurse or pharmacist before following any medical regimen to see if it is safe and effective for you  Obesity   AMBULATORY CARE:   Obesity  is when your body mass index (BMI) is greater than 30  Your healthcare provider will use your height and weight to measure your BMI  The risks of obesity include  many health problems, such as injuries or physical disability  You may need tests to check for the following:  · Diabetes    · High blood pressure or high cholesterol    · Heart disease    · Gallbladder or liver disease    · Cancer of the colon, breast, prostate, liver, or kidney    · Sleep apnea    · Arthritis or gout    Seek care immediately if:   · You have a severe headache, confusion, or difficulty speaking  · You have weakness on one side of your body  · You have chest pain, sweating, or shortness of breath  Contact your healthcare provider if:   · You have symptoms of gallbladder or liver disease, such as pain in your upper abdomen  · You have knee or hip pain and discomfort while walking  · You have symptoms of diabetes, such as intense hunger and thirst, and frequent urination  · You have symptoms of sleep apnea, such as snoring or daytime sleepiness  · You have questions or concerns about your condition or care  Treatment for obesity  focuses on helping you lose weight to improve your health  Even a small decrease in BMI can reduce the risk for many health problems   Your healthcare provider will help you set a weight-loss goal   · Lifestyle changes  are the first step in treating obesity  These include making healthy food choices and getting regular physical activity  Your healthcare provider may suggest a weight-loss program that involves coaching, education, and therapy  · Medicine  may help you lose weight when it is used with a healthy diet and physical activity  · Surgery  can help you lose weight if you are very obese and have other health problems  There are several types of weight-loss surgery  Ask your healthcare provider for more information  Be successful losing weight:   · Set small, realistic goals  An example of a small goal is to walk for 20 minutes 5 days a week  Haim goal is to lose 5% of your body weight  · Tell friends, family members, and coworkers about your goals  and ask for their support  Ask a friend to lose weight with you, or join a weight-loss support group  · Identify foods or triggers that may cause you to overeat , and find ways to avoid them  Remove tempting high-calorie foods from your home and workplace  Place a bowl of fresh fruit on your kitchen counter  If stress causes you to eat, then find other ways to cope with stress  · Keep a diary to track what you eat and drink  Also write down how many minutes of physical activity you do each day  Weigh yourself once a week and record it in your diary  Eating changes: You will need to eat 500 to 1,000 fewer calories each day than you currently eat to lose 1 to 2 pounds a week  The following changes will help you cut calories:  · Eat smaller portions  Use small plates, no larger than 9 inches in diameter  Fill your plate half full of fruits and vegetables  Measure your food using measuring cups until you know what a serving size looks like  · Eat 3 meals and 1 or 2 snacks each day  Plan your meals in advance  Diaz Finley and eat at home most of the time  Eat slowly  Do not skip meals   Skipping meals can lead to overeating later in the day  This can make it harder for you to lose weight  Talk with a dietitian to help you make a meal plan and schedule that is right for you  · Eat fruits and vegetables at every meal   They are low in calories and high in fiber, which makes you feel full  Do not add butter, margarine, or cream sauce to vegetables  Use herbs to season steamed vegetables  · Eat less fat and fewer fried foods  Eat more baked or grilled chicken and fish  These protein sources are lower in calories and fat than red meat  Limit fast food  Dress your salads with olive oil and vinegar instead of bottled dressing  · Limit the amount of sugar you eat  Do not drink sugary beverages  Limit alcohol  Activity changes:  Physical activity is good for your body in many ways  It helps you burn calories and build strong muscles  It decreases stress and depression, and improves your mood  It can also help you sleep better  Talk to your healthcare provider before you begin an exercise program   · Exercise for at least 30 minutes 5 days a week  Start slowly  Set aside time each day for physical activity that you enjoy and that is convenient for you  It is best to do both weight training and an activity that increases your heart rate, such as walking, bicycling, or swimming  · Find ways to be more active  Do yard work and housecleaning  Walk up the stairs instead of using elevators  Spend your leisure time going to events that require walking, such as outdoor festivals or fairs  This extra physical activity can help you lose weight and keep it off  Follow up with your healthcare provider as directed: You may need to meet with a dietitian  Write down your questions so you remember to ask them during your visits  © Copyright [address] Information is for End User's use only and may not be sold, redistributed or otherwise used for commercial purposes   All illustrations and images included in CareNotes® are the copyrighted property of [employer]  or [address]   The above information is an  only  It is not intended as medical advice for individual conditions or treatments  Talk to your doctor, nurse or pharmacist before following any medical regimen to see if it is safe and effective for you  Heart Healthy Diet   AMBULATORY CARE:   A heart healthy diet  is an eating plan low in unhealthy fats and sodium (salt)  The plan is high in healthy fats and fiber  A heart healthy diet helps improve your cholesterol levels and lowers your risk for heart disease and stroke  A dietitian will teach you how to read and understand food labels  Heart healthy diet guidelines to follow:   · Choose foods that contain healthy fats  ? Unsaturated fats  include monounsaturated and polyunsaturated fats  Unsaturated fat is found in foods such as soybean, canola, olive, corn, and safflower oils  It is also found in soft tub margarine that is made with liquid vegetable oil  ? Omega-3 fat  is found in certain fish, such as salmon, tuna, and trout, and in walnuts and flaxseed  Eat fish high in omega-3 fats at least 2 times a week  · Get 20 to 30 grams of fiber each day  Fruits, vegetables, whole-grain foods, and legumes (cooked beans) are good sources of fiber  · Limit or do not have unhealthy fats  ? Cholesterol  is found in animal foods, such as eggs and lobster, and in dairy products made from whole milk  Limit cholesterol to less than 200 mg each day  ? Saturated fat  is found in meats, such as tariq and hamburger  It is also found in chicken or turkey skin, whole milk, and butter  Limit saturated fat to less than 7% of your total daily calories  ? Trans fat  is found in packaged foods, such as potato chips and cookies  It is also in hard margarine, some fried foods, and shortening  Do not eat foods that contain trans fats  · Limit sodium as directed    You may be told to limit sodium to 2,000 to 2,300 mg each day  Choose low-sodium or no-salt-added foods  Add little or no salt to food you prepare  Use herbs and spices in place of salt  Include the following in your heart healthy plan:  Ask your dietitian or healthcare provider how many servings to have from each of the following food groups:  · Grains:      ? Whole-wheat breads, cereals, and pastas, and brown rice    ? Low-fat, low-sodium crackers and chips    · Vegetables:      ? Broccoli, green beans, green peas, and spinach    ? Collards, kale, and lima beans    ? Carrots, sweet potatoes, tomatoes, and peppers    ? Canned vegetables with no salt added    · Fruits:      ? Bananas, peaches, pears, and pineapple    ? Grapes, raisins, and dates    ? Oranges, tangerines, grapefruit, orange juice, and grapefruit juice    ? Apricots, mangoes, melons, and papaya    ? Raspberries and strawberries    ? Canned fruit with no added sugar    · Low-fat dairy:      ? Nonfat (skim) milk, 1% milk, and low-fat almond, cashew, or soy milks fortified with calcium    ? Low-fat cheese, regular or frozen yogurt, and cottage cheese    · Meats and proteins:      ? Lean cuts of beef and pork (loin, leg, round), skinless chicken and turkey    ? Legumes, soy products, egg whites, or nuts    Limit or do not include the following in your heart healthy plan:   · Unhealthy fats and oils:      ? Whole or 2% milk, cream cheese, sour cream, or cheese    ? High-fat cuts of beef (T-bone steaks, ribs), chicken or turkey with skin, and organ meats such as liver    ? Butter, stick margarine, shortening, and cooking oils such as coconut or palm oil    · Foods and liquids high in sodium:      ? Packaged foods, such as frozen dinners, cookies, macaroni and cheese, and cereals with more than 300 mg of sodium per serving    ? Vegetables with added sodium, such as instant potatoes, vegetables with added sauces, or regular canned vegetables    ?  Cured or smoked meats, such as hot dogs, tariq, and sausage    ? High-sodium ketchup, barbecue sauce, salad dressing, pickles, olives, soy sauce, or miso    · Foods and liquids high in sugar:      ? Candy, cake, cookies, pies, or doughnuts    ? Soft drinks (soda), sports drinks, or sweetened tea    ? Canned or dry mixes for cakes, soups, sauces, or gravies    Other healthy heart guidelines:   · Do not smoke  Nicotine and other chemicals in cigarettes and cigars can cause lung and heart damage  Ask your healthcare provider for information if you currently smoke and need help to quit  E-cigarettes or smokeless tobacco still contain nicotine  Talk to your healthcare provider before you use these products  · Limit or do not drink alcohol as directed  Alcohol can damage your heart and raise your blood pressure  Your healthcare provider may give you specific daily and weekly limits  The general recommended limit is 1 drink a day for women 21 or older and for men 72 or older  Do not have more than 3 drinks in a day or 7 in a week  The recommended limit is 2 drinks a day for men 24to 59years of age  Do not have more than 4 drinks in a day or 14 in a week  A drink of alcohol is 12 ounces of beer, 5 ounces of wine, or 1½ ounces of liquor  · Exercise regularly  Exercise can help you maintain a healthy weight and improve your blood pressure and cholesterol levels  Regular exercise can also decrease your risk for heart problems  Ask your healthcare provider about the best exercise plan for you  Do not start an exercise program without asking your healthcare provider  Follow up with your doctor or cardiologist as directed:  Write down your questions so you remember to ask them during your visits  © Copyright [address] Information is for End User's use only and may not be sold, redistributed or otherwise used for commercial purposes   All illustrations and images included in CareNotes® are the copyrighted property of [employer]  or [address]   The above information is an  only  It is not intended as medical advice for individual conditions or treatments  Talk to your doctor, nurse or pharmacist before following any medical regimen to see if it is safe and effective for you  Depression   AMBULATORY CARE:   Depression  is a medical condition that causes feelings of sadness or hopelessness that do not go away  Depression may cause you to lose interest in things you used to enjoy  These feelings may interfere with your daily life  Common symptoms include the following:   · Appetite changes, or weight gain or loss    · Trouble going to sleep or staying asleep, or sleeping too much    · Fatigue or lack of energy    · Feeling restless, irritable, or withdrawn    · Feeling worthless, hopeless, discouraged, or guilty    · Trouble concentrating, remembering things, doing daily tasks, or making decisions    · Thoughts about hurting or killing yourself    Call your local emergency number (911 in the [address] [place] [address],[address]) if:   · You think about harming yourself or someone else  · You have done something on purpose to hurt yourself  Call your therapist or doctor if:   · Your symptoms do not improve  · You cannot make it to your next appointment  · You have new symptoms  · You have questions or concerns about your condition or care  The following resources are available at any time to help you, if needed:   · [address] [place] [address]: [phone] ([phone])     · Suicide Hotline: 4-567.345.8097 (3-415-CMQVZEP)     · For a list of international numbers: https://save org/find-help/international-resources/    Treatment for depression  may include medicine to relieve depression  Medicine is often used together with therapy  Therapy is a way for you to talk about your feelings and anything that may be causing depression  Therapy can be done alone or in a group   It may also be done with family members or a significant other  Self-care:   · Get regular physical activity  Try to be active for 30 minutes, 3 to 5 days a week  Physical activity can help relieve depression  Work with your healthcare provider to develop a plan that you enjoy  It may help to ask someone to be active with you  · Create a regular sleep schedule  A routine can help you relax before bed  Listen to music, read, or do yoga  Try to go to bed and wake up at the same time every day  Sleep is important for emotional health  · Eat a variety of healthy foods  Healthy foods include fruits, vegetables, whole-grain breads, low-fat dairy products, lean meats, fish, and cooked beans  A healthy meal plan is low in fat, salt, and added sugar  · Do not drink alcohol or use drugs  Alcohol and drugs can make depression worse  Talk to your therapist or doctor if you need help quitting  Follow up with your healthcare provider as directed: Your healthcare provider will monitor your progress at follow-up visits  He or she will also monitor your medicine if you take antidepressants  Your healthcare provider will ask if the medicine is helping  Tell him or her about any side effects or problems you may have with your medicine  The type or amount of medicine may need to be changed  Write down your questions so you remember to ask them during your visits  © Copyright [address] Information is for End User's use only and may not be sold, redistributed or otherwise used for commercial purposes  All illustrations and images included in CareNotes® are the copyrighted property of A D A M , Inc  or [facility] Xiomara Granados   The above information is an  only  It is not intended as medical advice for individual conditions or treatments  Talk to your doctor, nurse or pharmacist before following any medical regimen to see if it is safe and effective for you

## 2021-06-10 NOTE — ASSESSMENT & PLAN NOTE
Pt is breathing better and off lasix  Only using her oxygen at night with her cpap  Stay on current meds and keep the f/u with pulmonary

## 2021-06-10 NOTE — ASSESSMENT & PLAN NOTE
Stable on current meds, just feels depressed about her weight which she is unable to lose  Pt tried to go to weight management but medicare would not pay

## 2021-06-10 NOTE — ASSESSMENT & PLAN NOTE
Wt Readings from Last 3 Encounters:   06/10/21 101 kg (222 lb 3 2 oz)   05/10/21 98 kg (216 lb)   03/08/21 99 3 kg (219 lb)       Doing well off her lasix  Will schedule f/u with Dr Suraj Obregon

## 2021-06-10 NOTE — PROGRESS NOTES
Assessment/Plan:     Chronic Problems:  Hypertension  BP is stable  Continue current meds  Chronic hypoxemic respiratory failure (HCC)  Pt is breathing better and off lasix  Only using her oxygen at night with her cpap  Stay on current meds and keep the f/u with pulmonary  Depression, recurrent ([facility] [address] )  Stable on current meds, just feels depressed about her weight which she is unable to lose  Pt tried to go to weight management but medicare would not pay  Chronic diastolic (congestive) heart failure (HCC)  Wt Readings from Last 3 Encounters:   06/10/21 101 kg (222 lb 3 2 oz)   05/10/21 98 kg (216 lb)   03/08/21 99 3 kg (219 lb)       Doing well off her lasix  Will schedule f/u with Dr Rios Sellers  Visit Diagnosis:  Diagnoses and all orders for this visit:    Essential hypertension    At risk for falls    Left hip pain  Comments: Will get xrays and call with results  Tylenol is ok for the pains  Orders:  -     XR hip/pelv 2-3 vws left if performed; Future    Chronic hypoxemic respiratory failure ([facility] )    Morbid obesity with BMI of 50 0-59 9, adult (HCC)    Chronic diastolic (congestive) heart failure ([facility] )  -     Ambulatory referral to Cardiology; Future          Subjective:    Patient ID: Ernesto Jiménez is a 79 y o  female  Pt is here for routine f/u appt  Very depressed about losing weight, but does not think she needs to add meds for this  Gained 3 lbs since the last visit  Only using her oxygen at night with her cpap machine  Having pain in the left hip which she had replaced in 2007  No new injury, just arthritis per pt  Feels she may need another xray  Not checking bp's at home  Follows with Dr Rios Sellers, but has not seen him since last year  Takes all other meds as directed  No side effects noted         The following portions of the patient's history were reviewed and updated as appropriate: allergies, current medications, past family history, past medical history, past social history, past surgical history and problem list     Review of Systems   Constitutional: Negative for chills, diaphoresis, fatigue and fever  HENT: Negative  Eyes: Negative  Respiratory: Negative for cough, shortness of breath and wheezing  Cardiovascular: Negative for chest pain and palpitations  Gastrointestinal: Negative  Genitourinary: Positive for urgency  Negative for dysuria and frequency  Still with urinary incontinence at times  Feels it is better but not 100%  Follows with Dr Falguni Fishman  Musculoskeletal: Positive for arthralgias (pain in the left hip s/p replacement in 2007) and gait problem  Neurological: Negative for dizziness, light-headedness and headaches  Psychiatric/Behavioral: Positive for dysphoric mood (about her weight)  The patient is not nervous/anxious            /70   Pulse 67   Temp 98 9 °F (37 2 °C)   Resp 18   Ht 4' 6" (1 372 m)   Wt 101 kg (222 lb 3 2 oz)   SpO2 90% Comment: redo 94%  BMI 53 57 kg/m²   Social History     Socioeconomic History    Marital status: Single     Spouse name: Not on file    Number of children: Not on file    Years of education: Not on file    Highest education level: Not on file   Occupational History    Not on file   Social Needs    Financial resource strain: Not on file    Food insecurity     Worry: Not on file     Inability: Not on file    Transportation needs     Medical: Not on file     Non-medical: Not on file   Tobacco Use    Smoking status: Never Smoker    Smokeless tobacco: Never Used   Substance and Sexual Activity    Alcohol use: No    Drug use: No    Sexual activity: Never   Lifestyle    Physical activity     Days per week: Not on file     Minutes per session: Not on file    Stress: Not on file   Relationships    Social connections     Talks on phone: Not on file     Gets together: Not on file     Attends Yarsanism service: Not on file     Active member of club or organization: Not on file     Attends meetings of clubs or organizations: Not on file     Relationship status: Not on file    Intimate partner violence     Fear of current or ex partner: Not on file     Emotionally abused: Not on file     Physically abused: Not on file     Forced sexual activity: Not on file   Other Topics Concern    Not on file   Social History Narrative    Denied drinking coffee    Denied exercise habits    Most recent tobacco use screenin2018      Do you currently or have you served in the [employer]:   No      Were you activated, into active duty, as a member of the [employer] or as a Reservist:   No          Past Medical History:   Diagnosis Date    Anemia 2018    Anxiety     Arthritis     AVB (atrioventricular block)     first degree    Cataract     CHF (congestive heart failure) ([facility] )     COPD, mild ([facility] )     Coronary artery disease     Dislocation of right shoulder joint     Frequent UTI     GERD (gastroesophageal reflux disease)     H/O: pneumonia     Heme positive stool     Hyperlipidemia     Hypertension     Hypothyroidism     Morbid obesity with BMI of 50 0-59 9, adult ([facility] )     Obesity, morbid ([facility] ) 2018    JANICE on CPAP     [facility]    Severe aortic stenosis     Simple goiter     Skin cyst     within the armpits, right    Wears glasses      Family History   Problem Relation Age of Onset    Diabetes Mother     Stroke Mother     Cancer Father     Lung cancer Father     Diabetes Sister     Heart disease Sister     Hypertension Sister     Coronary artery disease Family     Diabetes Family     Hypertension Family     Cancer Family     Stroke Family     Thyroid disease Neg Hx      Past Surgical History:   Procedure Laterality Date    BREAST BIOPSY      CARDIAC CATHETERIZATION      CARPAL TUNNEL RELEASE Bilateral     CHOLECYSTECTOMY      DILATION AND CURETTAGE OF UTERUS      HYSTEROSCOPY      MASTOID SURGERY      MA COLONOSCOPY FLX DX W/COLLJ [facility] WHEN PFRMD N/A 9/6/2018    Procedure: COLONOSCOPY;  Surgeon: Fatemeh Velásquez MD;  [facility]: [facility]; Service: Gastroenterology    NE ECHO TRANSESOPHAG R-T 2D W/PRB IMG ACQUISJ I&R N/A 10/9/2018    Procedure: INTRA-OP TRANSESOPHAGEAL ECHOCARDIOGRAM (GARRISON); Surgeon: Terrence Fields DO;  [facility]: [facility];  Service: Cardiac Surgery    NE ESOPHAGOGASTRODUODENOSCOPY TRANSORAL DIAGNOSTIC N/A 8/31/2018    Procedure: ESOPHAGOGASTRODUODENOSCOPY (EGD); Surgeon: Fatemeh Velásquez MD;  [facility]; Service: Gastroenterology    NE REPLACE AORTIC VALVE OPENFEMORAL ARTERY APPROACH N/A 10/9/2018    Procedure: REPLACEMENT AORTIC VALVE TRANSCATHETER (TAVR) TRANSFEMORAL W/ 23 MM MENDOZA NOE S3 VALVE (ACCESS OF LEFT);   Surgeon: Terrence Fields DO;  [facility];  Service: Cardiac Surgery    TOTAL HIP ARTHROPLASTY Left 2007    TOTAL KNEE ARTHROPLASTY Bilateral        Current Outpatient Medications:     albuterol (2 5 mg/3 mL) 0 083 % nebulizer solution, Take 1 vial (2 5 mg total) by nebulization every 6 (six) hours as needed for wheezing or shortness of breath, Disp: 360 mL, Rfl: 1    alendronate (Fosamax) 70 mg tablet, Take 1 tablet (70 mg total) by mouth every 7 days, Disp: 4 tablet, Rfl: 5    aspirin (ECOTRIN LOW STRENGTH) 81 mg EC tablet, Take 1 tablet (81 mg total) by mouth daily, Disp: 100 tablet, Rfl: 0    b complex vitamins capsule, Take 1 capsule by mouth 2 (two) times a day  , Disp: , Rfl:     Calcium Carb-Cholecalciferol (CALCIUM 600 + D PO), Take 1 tablet by mouth 2 (two) times a day, Disp: , Rfl:     Fesoterodine Fumarate ER (Toviaz) 8 MG TB24, Take 8 mg by mouth daily , Disp: , Rfl:     fluticasone-salmeterol (Wixela Inhub) 250-50 mcg/dose inhaler, Inhale 1 puff 2 (two) times a day Rinse mouth after use , Disp: 1 Inhaler, Rfl: 5    levothyroxine 50 mcg tablet, TAKE 1 TABLET BY MOUTH EVERY DAY, Disp: 90 tablet, Rfl: 1    loratadine (CLARITIN) 10 mg tablet, TAKE 1 TABLET BY MOUTH EVERY DAY, Disp: 90 tablet, Rfl: 1    metoprolol succinate (TOPROL-XL) 25 mg 24 hr tablet, TAKE 1 TABLET BY MOUTH EVERY DAY, Disp: 90 tablet, Rfl: 3    olmesartan (BENICAR) 5 mg tablet, TAKE 1 TABLET BY MOUTH EVERY DAY, Disp: 90 tablet, Rfl: 1    omeprazole (PriLOSEC) 40 MG capsule, TAKE 1 CAPSULE BY MOUTH TWICE A DAY, Disp: 180 capsule, Rfl: 1    sertraline (ZOLOFT) 100 mg tablet, TAKE 1 TABLET BY MOUTH EVERY DAY, Disp: 90 tablet, Rfl: 0    simvastatin (ZOCOR) 40 mg tablet, TAKE 1 TABLET BY MOUTH DAILY AT BEDTIME, Disp: 90 tablet, Rfl: 1    albuterol (PROVENTIL HFA,VENTOLIN HFA) 90 mcg/act inhaler, Inhale 2 puffs every 6 (six) hours as needed for wheezing (Patient not taking: Reported on 3/8/2021), Disp: 1 Inhaler, Rfl: 3    clotrimazole-betamethasone (LOTRISONE) 1-0 05 % cream, Apply topically 2 (two) times a day, Disp: 30 g, Rfl: 0    Cranberry 250 MG TABS, Take by mouth, Disp: , Rfl:     mometasone (ELOCON) 0 1 % cream, APPLY TO THE RIGHT EAR CANAL TWICE DAILY FOR 2 WEEKS, THEN DECREASE TO ONCE AT BEDTIME OR AS NEEDED, Disp: 45 g, Rfl: 0    Allergies   Allergen Reactions    Latex Rash    Neosporin [Neomycin-Bacitracin Zn-Polymyx] Rash and Other (See Comments)     hives per [facility] order          Lab Review   No visits with results within 2 Month(s) from this visit     Latest known visit with results is:   Lab on 02/17/2021   Component Date Value    Sodium 02/17/2021 139     Potassium 02/17/2021 5 0     Chloride 02/17/2021 103     CO2 02/17/2021 31     ANION GAP 02/17/2021 5     BUN 02/17/2021 27*    Creatinine 02/17/2021 0 99     Glucose, Fasting 02/17/2021 82     Calcium 02/17/2021 9 6     Corrected Calcium 02/17/2021 10 1     AST 02/17/2021 30     ALT 02/17/2021 28     Alkaline Phosphatase 02/17/2021 73     Total Protein 02/17/2021 7 6     Albumin 02/17/2021 3 4*    Total Bilirubin 02/17/2021 0 40     eGFR 02/17/2021 54     Cholesterol 02/17/2021 160     Triglycerides 02/17/2021 92     HDL, Direct 02/17/2021 72     LDL Calculated 02/17/2021 70     Non-HDL-Chol (CHOL-HDL) 02/17/2021 88     TSH 3RD GENERATON 02/17/2021 1 871         Imaging: No results found  Objective:     Physical Exam      Patient Instructions     Reviewed all with patient  Blood pressure is acceptable today  Have the x-ray done of the left hip I will call with results  Try to continue to work on the weight  The best thing you could do for you self at this point is make sure you are drinking at least 6 glasses of water a day  Cut your portions in half  What you would normally eat only eat half of  Cut back on carbohydrates like bread pasta rice cereal potatoes cakes and cookies  Keep your follow-up appointment with all your specialist I am putting in a referral for your cardiologist as you have not seen him for a while  If you do not hear from their office within a week with an appointment date please call here and let us know  Follow-up here in about 2 months for annual wellness exam     Fall Prevention   AMBULATORY CARE:   Fall prevention  includes ways to make your home and other areas safer  It also includes ways you can move more carefully to prevent a fall  Health conditions that cause changes in your blood pressure, vision, or muscle strength and coordination may increase your risk for falls  Medicines may also increase your risk for falls if they make you dizzy, weak, or sleepy  Call 911 or have someone else call if:   · You have fallen and are unconscious  · You have fallen and cannot move part of your body  Contact your healthcare provider if:   · You have fallen and have pain or a headache  · You have questions or concerns about your condition or care  Fall prevention tips:   · Stand or sit up slowly  This may help you keep your balance and prevent falls  · Use assistive devices as directed    Your healthcare provider may suggest that you use a cane or walker to help you keep your balance  You may need to have grab bars put in your bathroom near the toilet or in the shower  · Wear shoes that fit well and have soles that   Wear shoes both inside and outside  Use slippers with good   Do not wear shoes with high heels  · Wear a personal alarm  This is a device that allows you to call 911 if you fall and need help  Ask your healthcare provider for more information  · Stay active  Exercise can help strengthen your muscles and improve your balance  Your healthcare provider may recommend water aerobics or walking  He or she may also recommend physical therapy to improve your coordination  Never start an exercise program without talking to your healthcare provider first          · Manage your medical conditions  Keep all appointments with your healthcare providers  Visit your eye doctor as directed  Home safety tips:       · Add items to prevent falls in the bathroom  Put nonslip strips on your bath or shower floor to prevent you from slipping  Use a bath mat if you do not have carpet in the bathroom  This will prevent you from falling when you step out of the bath or shower  Use a shower seat so you do not need to stand while you shower  Sit on the toilet or a chair in your bathroom to dry yourself and put on clothing  This will prevent you from losing your balance from drying or dressing yourself while you are standing  · Keep paths clear  Remove books, shoes, and other objects from walkways and stairs  Place cords for telephones and lamps out of the way so that you do not need to walk over them  Tape them down if you cannot move them  Remove small rugs  If you cannot remove a rug, secure it with double-sided tape  This will prevent you from tripping  · Install bright lights in your home  Use night lights to help light paths to the bathroom or kitchen  Always turn on the light before you start walking      · Keep items you use often on shelves within reach  Do not use a step stool to help you reach an item  · Paint or place reflective tape on the edges of your stairs  This will help you see the stairs better  Follow up with your healthcare provider as directed:  Write down your questions so you remember to ask them during your visits  © Copyright [address] Information is for End User's use only and may not be sold, redistributed or otherwise used for commercial purposes  All illustrations and images included in CareNotes® are the copyrighted property of A D A M , Inc  or [facility] [employer]   The above information is an  only  It is not intended as medical advice for individual conditions or treatments  Talk to your doctor, nurse or pharmacist before following any medical regimen to see if it is safe and effective for you  Obesity   AMBULATORY CARE:   Obesity  is when your body mass index (BMI) is greater than 30  Your healthcare provider will use your height and weight to measure your BMI  The risks of obesity include  many health problems, such as injuries or physical disability  You may need tests to check for the following:  · Diabetes    · High blood pressure or high cholesterol    · Heart disease    · Gallbladder or liver disease    · Cancer of the colon, breast, prostate, liver, or kidney    · Sleep apnea    · Arthritis or gout    Seek care immediately if:   · You have a severe headache, confusion, or difficulty speaking  · You have weakness on one side of your body  · You have chest pain, sweating, or shortness of breath  Contact your healthcare provider if:   · You have symptoms of gallbladder or liver disease, such as pain in your upper abdomen  · You have knee or hip pain and discomfort while walking  · You have symptoms of diabetes, such as intense hunger and thirst, and frequent urination  · You have symptoms of sleep apnea, such as snoring or daytime sleepiness      · You have questions or concerns about your condition or care  Treatment for obesity  focuses on helping you lose weight to improve your health  Even a small decrease in BMI can reduce the risk for many health problems  Your healthcare provider will help you set a weight-loss goal   · Lifestyle changes  are the first step in treating obesity  These include making healthy food choices and getting regular physical activity  Your healthcare provider may suggest a weight-loss program that involves coaching, education, and therapy  · Medicine  may help you lose weight when it is used with a healthy diet and physical activity  · Surgery  can help you lose weight if you are very obese and have other health problems  There are several types of weight-loss surgery  Ask your healthcare provider for more information  Be successful losing weight:   · Set small, realistic goals  An example of a small goal is to walk for 20 minutes 5 days a week  Anther goal is to lose 5% of your body weight  · Tell friends, family members, and coworkers about your goals  and ask for their support  Ask a friend to lose weight with you, or join a weight-loss support group  · Identify foods or triggers that may cause you to overeat , and find ways to avoid them  Remove tempting high-calorie foods from your home and workplace  Place a bowl of fresh fruit on your kitchen counter  If stress causes you to eat, then find other ways to cope with stress  · Keep a diary to track what you eat and drink  Also write down how many minutes of physical activity you do each day  Weigh yourself once a week and record it in your diary  Eating changes: You will need to eat 500 to 1,000 fewer calories each day than you currently eat to lose 1 to 2 pounds a week  The following changes will help you cut calories:  · Eat smaller portions  Use small plates, no larger than 9 inches in diameter  Fill your plate half full of fruits and vegetables   Measure your food using measuring cups until you know what a serving size looks like  · Eat 3 meals and 1 or 2 snacks each day  Plan your meals in advance  Alysa Spivey and eat at home most of the time  Eat slowly  Do not skip meals  Skipping meals can lead to overeating later in the day  This can make it harder for you to lose weight  Talk with a dietitian to help you make a meal plan and schedule that is right for you  · Eat fruits and vegetables at every meal   They are low in calories and high in fiber, which makes you feel full  Do not add butter, margarine, or cream sauce to vegetables  Use herbs to season steamed vegetables  · Eat less fat and fewer fried foods  Eat more baked or grilled chicken and fish  These protein sources are lower in calories and fat than red meat  Limit fast food  Dress your salads with olive oil and vinegar instead of bottled dressing  · Limit the amount of sugar you eat  Do not drink sugary beverages  Limit alcohol  Activity changes:  Physical activity is good for your body in many ways  It helps you burn calories and build strong muscles  It decreases stress and depression, and improves your mood  It can also help you sleep better  Talk to your healthcare provider before you begin an exercise program   · Exercise for at least 30 minutes 5 days a week  Start slowly  Set aside time each day for physical activity that you enjoy and that is convenient for you  It is best to do both weight training and an activity that increases your heart rate, such as walking, bicycling, or swimming  · Find ways to be more active  Do yard work and housecleaning  Walk up the stairs instead of using elevators  Spend your leisure time going to events that require walking, such as outdoor festivals or fairs  This extra physical activity can help you lose weight and keep it off  Follow up with your healthcare provider as directed: You may need to meet with a dietitian   Write down your questions so you remember to ask them during your visits  © Copyright [address] Information is for End User's use only and may not be sold, redistributed or otherwise used for commercial purposes  All illustrations and images included in CareNotes® are the copyrighted property of A D A M , Inc  or [employer]   The above information is an  only  It is not intended as medical advice for individual conditions or treatments  Talk to your doctor, nurse or pharmacist before following any medical regimen to see if it is safe and effective for you  Heart Healthy Diet   AMBULATORY CARE:   A heart healthy diet  is an eating plan low in unhealthy fats and sodium (salt)  The plan is high in healthy fats and fiber  A heart healthy diet helps improve your cholesterol levels and lowers your risk for heart disease and stroke  A dietitian will teach you how to read and understand food labels  Heart healthy diet guidelines to follow:   · Choose foods that contain healthy fats  ? Unsaturated fats  include monounsaturated and polyunsaturated fats  Unsaturated fat is found in foods such as soybean, canola, olive, corn, and safflower oils  It is also found in soft tub margarine that is made with liquid vegetable oil  ? Omega-3 fat  is found in certain fish, such as salmon, tuna, and trout, and in walnuts and flaxseed  Eat fish high in omega-3 fats at least 2 times a week  · Get 20 to 30 grams of fiber each day  Fruits, vegetables, whole-grain foods, and legumes (cooked beans) are good sources of fiber  · Limit or do not have unhealthy fats  ? Cholesterol  is found in animal foods, such as eggs and lobster, and in dairy products made from whole milk  Limit cholesterol to less than 200 mg each day  ? Saturated fat  is found in meats, such as tariq and hamburger  It is also found in chicken or turkey skin, whole milk, and butter   Limit saturated fat to less than 7% of your total daily calories  ? Trans fat  is found in packaged foods, such as potato chips and cookies  It is also in hard margarine, some fried foods, and shortening  Do not eat foods that contain trans fats  · Limit sodium as directed  You may be told to limit sodium to 2,000 to 2,300 mg each day  Choose low-sodium or no-salt-added foods  Add little or no salt to food you prepare  Use herbs and spices in place of salt  Include the following in your heart healthy plan:  Ask your dietitian or healthcare provider how many servings to have from each of the following food groups:  · Grains:      ? Whole-wheat breads, cereals, and pastas, and brown rice    ? Low-fat, low-sodium crackers and chips    · Vegetables:      ? Broccoli, green beans, green peas, and spinach    ? Collards, kale, and lima beans    ? Carrots, sweet potatoes, tomatoes, and peppers    ? Canned vegetables with no salt added    · Fruits:      ? Bananas, peaches, pears, and pineapple    ? Grapes, raisins, and dates    ? Oranges, tangerines, grapefruit, orange juice, and grapefruit juice    ? Apricots, mangoes, melons, and papaya    ? Raspberries and strawberries    ? Canned fruit with no added sugar    · Low-fat dairy:      ? Nonfat (skim) milk, 1% milk, and low-fat almond, cashew, or soy milks fortified with calcium    ? Low-fat cheese, regular or frozen yogurt, and cottage cheese    · Meats and proteins:      ? Lean cuts of beef and pork (loin, leg, round), skinless chicken and turkey    ? Legumes, soy products, egg whites, or nuts    Limit or do not include the following in your heart healthy plan:   · Unhealthy fats and oils:      ? Whole or 2% milk, cream cheese, sour cream, or cheese    ? High-fat cuts of beef (T-bone steaks, ribs), chicken or turkey with skin, and organ meats such as liver    ?  Butter, stick margarine, shortening, and cooking oils such as coconut or palm oil    · Foods and liquids high in sodium:      ? Packaged foods, such as frozen dinners, cookies, macaroni and cheese, and cereals with more than 300 mg of sodium per serving    ? Vegetables with added sodium, such as instant potatoes, vegetables with added sauces, or regular canned vegetables    ? Cured or smoked meats, such as hot dogs, tariq, and sausage    ? High-sodium ketchup, barbecue sauce, salad dressing, pickles, olives, soy sauce, or miso    · Foods and liquids high in sugar:      ? Candy, cake, cookies, pies, or doughnuts    ? Soft drinks (soda), sports drinks, or sweetened tea    ? Canned or dry mixes for cakes, soups, sauces, or gravies    Other healthy heart guidelines:   · Do not smoke  Nicotine and other chemicals in cigarettes and cigars can cause lung and heart damage  Ask your healthcare provider for information if you currently smoke and need help to quit  E-cigarettes or smokeless tobacco still contain nicotine  Talk to your healthcare provider before you use these products  · Limit or do not drink alcohol as directed  Alcohol can damage your heart and raise your blood pressure  Your healthcare provider may give you specific daily and weekly limits  The general recommended limit is 1 drink a day for women 21 or older and for men 72 or older  Do not have more than 3 drinks in a day or 7 in a week  The recommended limit is 2 drinks a day for men 24to 59years of age  Do not have more than 4 drinks in a day or 14 in a week  A drink of alcohol is 12 ounces of beer, 5 ounces of wine, or 1½ ounces of liquor  · Exercise regularly  Exercise can help you maintain a healthy weight and improve your blood pressure and cholesterol levels  Regular exercise can also decrease your risk for heart problems  Ask your healthcare provider about the best exercise plan for you  Do not start an exercise program without asking your healthcare provider         Follow up with your doctor or cardiologist as directed:  Write down your questions so you remember to ask them during your visits  © Copyright [address] Information is for End User's use only and may not be sold, redistributed or otherwise used for commercial purposes  All illustrations and images included in CareNotes® are the copyrighted property of A D A M , Inc  or Ame Mcgill  The above information is an  only  It is not intended as medical advice for individual conditions or treatments  Talk to your doctor, nurse or pharmacist before following any medical regimen to see if it is safe and effective for you  Depression   AMBULATORY CARE:   Depression  is a medical condition that causes feelings of sadness or hopelessness that do not go away  Depression may cause you to lose interest in things you used to enjoy  These feelings may interfere with your daily life  Common symptoms include the following:   · Appetite changes, or weight gain or loss    · Trouble going to sleep or staying asleep, or sleeping too much    · Fatigue or lack of energy    · Feeling restless, irritable, or withdrawn    · Feeling worthless, hopeless, discouraged, or guilty    · Trouble concentrating, remembering things, doing daily tasks, or making decisions    · Thoughts about hurting or killing yourself    Call your local emergency number (911 in the [address] [place] [address],[address]) if:   · You think about harming yourself or someone else  · You have done something on purpose to hurt yourself  Call your therapist or doctor if:   · Your symptoms do not improve  · You cannot make it to your next appointment  · You have new symptoms  · You have questions or concerns about your condition or care      The following resources are available at any time to help you, if needed:   · [address] [facility]: 1-721.430.6006 (7-404-870-GBVT)     · Suicide Hotline: 8-578.894.2870 (5-275-HTEZZVV)     · For a list of international numbers: https://save org/find-help/international-resources/    Treatment for depression  may include medicine to relieve depression  Medicine is often used together with therapy  Therapy is a way for you to talk about your feelings and anything that may be causing depression  Therapy can be done alone or in a group  It may also be done with family members or a significant other  Self-care:   · Get regular physical activity  Try to be active for 30 minutes, 3 to 5 days a week  Physical activity can help relieve depression  Work with your healthcare provider to develop a plan that you enjoy  It may help to ask someone to be active with you  · Create a regular sleep schedule  A routine can help you relax before bed  Listen to music, read, or do yoga  Try to go to bed and wake up at the same time every day  Sleep is important for emotional health  · Eat a variety of healthy foods  Healthy foods include fruits, vegetables, whole-grain breads, low-fat dairy products, lean meats, fish, and cooked beans  A healthy meal plan is low in fat, salt, and added sugar  · Do not drink alcohol or use drugs  Alcohol and drugs can make depression worse  Talk to your therapist or doctor if you need help quitting  Follow up with your healthcare provider as directed: Your healthcare provider will monitor your progress at follow-up visits  He or she will also monitor your medicine if you take antidepressants  Your healthcare provider will ask if the medicine is helping  Tell him or her about any side effects or problems you may have with your medicine  The type or amount of medicine may need to be changed  Write down your questions so you remember to ask them during your visits  © Copyright [address] Information is for End User's use only and may not be sold, redistributed or otherwise used for commercial purposes  All illustrations and images included in CareNotes® are the copyrighted property of [employer]  or [facility] Xiomara Granados   The above information is an  only   It is not intended as medical advice for individual conditions or treatments  Talk to your doctor, nurse or pharmacist before following any medical regimen to see if it is safe and effective for you  MABEL Castro    Portions of the record may have been created with voice recognition software  Occasional wrong word or "sound a like" substitutions may have occurred due to the inherent limitations of voice recognition software  Read the chart carefully and recognize, using context, where substitutions have occurred  Falls Plan of Care: Balance, strength, and gait training instructions were provided  BMI Counseling: Body mass index is 53 57 kg/m²  The BMI is above normal  Nutrition recommendations include 3-5 servings of fruits/vegetables daily and moderation in carbohydrate intake  Depression Screening Follow-up Plan: Patient's depression screening was positive with a PHQ-2 score of 4  Their PHQ-9 score was 12  Clinically patient does not have depression  No treatment is required

## 2021-06-11 ENCOUNTER — HOSPITAL ENCOUNTER (OUTPATIENT)
Dept: RADIOLOGY | Facility: HOSPITAL | Age: 81
Discharge: HOME | End: 2021-06-11
Payer: MEDICARE

## 2021-06-11 DIAGNOSIS — M25.552 LEFT HIP PAIN: ICD-10-CM

## 2021-06-11 PROCEDURE — 73502 X-RAY EXAM HIP UNI 2-3 VIEWS: CPT

## 2021-06-16 ENCOUNTER — TELEPHONE (OUTPATIENT)
Dept: FAMILY MEDICINE CLINIC | Facility: CLINIC | Age: 81
End: 2021-06-16

## 2021-06-17 NOTE — TELEPHONE ENCOUNTER
Spoke with patient patient and she is aware that it was not read yet   I did call radiology to ask if they can have someone read it

## 2021-06-20 DIAGNOSIS — K21.00 GASTROESOPHAGEAL REFLUX DISEASE WITH ESOPHAGITIS: ICD-10-CM

## 2021-06-21 RX ORDER — OMEPRAZOLE 40 MG/1
CAPSULE, DELAYED RELEASE ORAL
Qty: 180 CAPSULE | Refills: 1 | Status: SHIPPED | OUTPATIENT
Start: 2021-06-21 | End: 2021-12-15

## 2021-06-28 ENCOUNTER — TELEPHONE (OUTPATIENT)
Dept: FAMILY MEDICINE CLINIC | Facility: CLINIC | Age: 81
End: 2021-06-28

## 2021-06-28 NOTE — TELEPHONE ENCOUNTER
Patient is asking if we can help move up her appointment for Dr Moo Guerrero on 7/21/21   She said she can not wait that long for her ortho appt    Thank you

## 2021-06-29 DIAGNOSIS — I10 ESSENTIAL HYPERTENSION: ICD-10-CM

## 2021-06-29 RX ORDER — OLMESARTAN MEDOXOMIL 5 MG/1
5 TABLET ORAL DAILY
Qty: 90 TABLET | Refills: 3 | Status: SHIPPED | OUTPATIENT
Start: 2021-06-29 | End: 2021-08-31 | Stop reason: SDUPTHER

## 2021-06-29 NOTE — TELEPHONE ENCOUNTER
Called ortho and they were able to  up to next week 7/9 2021 at 9:45 am with another provider Gabbi Pierce   Patient is aware of this

## 2021-06-29 NOTE — TELEPHONE ENCOUNTER
Pt called to request to a refill for olmesartan (BENICAR) 5 mg tablet for a 90 day supply  Please send to [facility] pharmacy on file

## 2021-07-09 ENCOUNTER — OFFICE VISIT (OUTPATIENT)
Dept: OBGYN CLINIC | Facility: CLINIC | Age: 81
End: 2021-07-09
Payer: MEDICARE

## 2021-07-09 ENCOUNTER — TELEPHONE (OUTPATIENT)
Dept: OBGYN CLINIC | Facility: HOSPITAL | Age: 81
End: 2021-07-09

## 2021-07-09 VITALS
HEART RATE: 62 BPM | BODY MASS INDEX: 51.61 KG/M2 | WEIGHT: 223 LBS | HEIGHT: 55 IN | SYSTOLIC BLOOD PRESSURE: 147 MMHG | DIASTOLIC BLOOD PRESSURE: 70 MMHG

## 2021-07-09 DIAGNOSIS — M54.16 RADICULOPATHY, LUMBAR REGION: Primary | ICD-10-CM

## 2021-07-09 DIAGNOSIS — M25.552 LEFT HIP PAIN: ICD-10-CM

## 2021-07-09 PROCEDURE — 99204 OFFICE O/P NEW MOD 45 MIN: CPT | Performed by: ORTHOPAEDIC SURGERY

## 2021-07-09 RX ORDER — METHYLPREDNISOLONE 4 MG/1
TABLET ORAL
Qty: 1 EACH | Refills: 1 | Status: SHIPPED | OUTPATIENT
Start: 2021-07-09 | End: 2022-03-14 | Stop reason: ALTCHOICE

## 2021-07-09 NOTE — PATIENT INSTRUCTIONS
On exam, the patient has no pain with hip ROM and her symptoms correlate with lumbar spine  * Referral to Pain management for evaluate and treatment  * Medrol dose alexis sent to the patient's confirmed pharmacy

## 2021-07-09 NOTE — PROGRESS NOTES
Orthopaedics Office Visit - New Patient Visit    ASSESSMENT/PLAN:    Assessment:    left lumbar radiculopathy    Plan:   *  X-rays of the left hip from 06/11/2021 were reviewed   * On exam, the patient has no pain with hip ROM and her symptoms correlate with lumbar spine  * Referral to Pain management for evaluate and treatment  * Medrol dose laexis sent to the patient's confirmed pharmacy       To Do Next Visit:  Re-evaluate     _____________________________________________________  CHIEF COMPLAINT:  Chief Complaint   Patient presents with    Left Hip - Pain         SUBJECTIVE:  Nika Neal is a 79 y o  female who presents today for consultation for her left hip pain referred by her nurse practitioner Vance Milligan  Patient states that she has been having left posterior buttock pain that does travel distally on posterior thigh  She notes that she does get some associated numbness and tingling  This has been going on for at least 1 months  She has a history of a left total hip arthroplasty from a physician at [facility], 2007  Patient is on oxygen and ambulates with a walker today      PAST MEDICAL HISTORY:  Past Medical History:   Diagnosis Date    Anemia 08/22/2018    Anxiety     Arthritis     AVB (atrioventricular block)     first degree    Cataract     [facility]estive [facility] failure) [facility]     COPD, mild (HCC)     Coronary artery disease     Dislocation of right shoulder joint     Frequent UTI     GERD (gastroesophageal reflux disease)     H/O: pneumonia     Heme positive stool     Hyperlipidemia     Hypertension     Hypothyroidism     Morbid obesity with BMI of 50 0-59 9, adult ([facility] [address]     Obesity, morbid ([facility] [address] ) 08/22/2018    JANICE on CPAP     Pulmonary hypertension ([facility] [address] ) 08/22/2018    Severe aortic stenosis     Simple goiter     Skin cyst     within the armpits, right    Wears glasses        PAST SURGICAL HISTORY:  Past Surgical History:   Procedure Laterality Date    BREAST BIOPSY  CARDIAC CATHETERIZATION      CARPAL TUNNEL RELEASE Bilateral     CHOLECYSTECTOMY      DILATION AND CURETTAGE OF UTERUS      HYSTEROSCOPY      MASTOID SURGERY      UT COLONOSCOPY FLX DX W/COLLJ SPEC WHEN PFRMD N/A 9/6/2018    Procedure: COLONOSCOPY;  Surgeon: Eleanor Abad MD;  [facility]: [facility]; Service: Gastroenterology    UT ECHO TRANSESOPHAG R-T 2D W/PRB IMG ACQUISJ I&R N/A 10/9/2018    Procedure: INTRA-OP TRANSESOPHAGEAL ECHOCARDIOGRAM (GARRISON); Surgeon: Kandace Prader, DO;  [facility];  Service: Cardiac Surgery    UT ESOPHAGOGASTRODUODENOSCOPY TRANSORAL DIAGNOSTIC N/A 8/31/2018    Procedure: ESOPHAGOGASTRODUODENOSCOPY (EGD); Surgeon: Eleanor Abad MD;  [facility]; Service: Gastroenterology    UT REPLACE AORTIC VALVE OPENFEMORAL ARTERY APPROACH N/A 10/9/2018    Procedure: REPLACEMENT AORTIC VALVE TRANSCATHETER (TAVR) TRANSFEMORAL W/ 23 MM MENDOZA NOE S3 VALVE (ACCESS OF LEFT);   Surgeon: Kandace Prader, DO;  [facility];  Service: Cardiac Surgery    TOTAL HIP ARTHROPLASTY Left 2007    TOTAL KNEE ARTHROPLASTY Bilateral        FAMILY HISTORY:  Family History   Problem Relation Age of Onset    Diabetes Mother     Stroke Mother     Cancer Father     Lung cancer Father     Diabetes Sister     Heart disease Sister     Hypertension Sister     Coronary artery disease Family     Diabetes Family     Hypertension Family     Cancer Family     Stroke Family     Thyroid disease Neg Hx        SOCIAL HISTORY:  Social History     Tobacco Use    Smoking status: Never Smoker    Smokeless tobacco: Never Used   Vaping Use    Vaping Use: Never used   Substance Use Topics    Alcohol use: No    Drug use: No       MEDICATIONS:    Current Outpatient Medications:     albuterol (2 5 mg/3 mL) 0 083 % nebulizer solution, Take 1 vial (2 5 mg total) by nebulization every 6 (six) hours as needed for wheezing or shortness of breath, Disp: 360 mL, Rfl: 1   alendronate (Fosamax) 70 mg tablet, Take 1 tablet (70 mg total) by mouth every 7 days, Disp: 4 tablet, Rfl: 5    aspirin (ECOTRIN LOW STRENGTH) 81 mg EC tablet, Take 1 tablet (81 mg total) by mouth daily, Disp: 100 tablet, Rfl: 0    b complex vitamins capsule, Take 1 capsule by mouth 2 (two) times a day  , Disp: , Rfl:     Calcium Carb-Cholecalciferol (CALCIUM 600 + D PO), Take 1 tablet by mouth 2 (two) times a day, Disp: , Rfl:     clotrimazole-betamethasone (LOTRISONE) 1-0 05 % cream, Apply topically 2 (two) times a day, Disp: 30 g, Rfl: 0    Cranberry 250 MG TABS, Take by mouth, Disp: , Rfl:     Fesoterodine Fumarate ER (Toviaz) 8 MG TB24, Take 8 mg by mouth daily , Disp: , Rfl:     fluticasone-salmeterol (Wixela Inhub) 250-50 mcg/dose inhaler, Inhale 1 puff 2 (two) times a day Rinse mouth after use , Disp: 1 Inhaler, Rfl: 5    levothyroxine 50 mcg tablet, TAKE 1 TABLET BY MOUTH EVERY DAY, Disp: 90 tablet, Rfl: 1    loratadine (CLARITIN) 10 mg tablet, TAKE 1 TABLET BY MOUTH EVERY DAY, Disp: 90 tablet, Rfl: 1    metoprolol succinate (TOPROL-XL) 25 mg 24 hr tablet, TAKE 1 TABLET BY MOUTH EVERY DAY, Disp: 90 tablet, Rfl: 3    mometasone (ELOCON) 0 1 % cream, APPLY TO THE RIGHT EAR CANAL TWICE DAILY FOR 2 WEEKS, THEN DECREASE TO ONCE AT BEDTIME OR AS NEEDED, Disp: 45 g, Rfl: 0    olmesartan (BENICAR) 5 mg tablet, Take 1 tablet (5 mg total) by mouth daily, Disp: 90 tablet, Rfl: 3    omeprazole (PriLOSEC) 40 MG capsule, TAKE 1 CAPSULE BY MOUTH TWICE A DAY, Disp: 180 capsule, Rfl: 1    sertraline (ZOLOFT) 100 mg tablet, TAKE 1 TABLET BY MOUTH EVERY DAY, Disp: 90 tablet, Rfl: 0    simvastatin (ZOCOR) 40 mg tablet, TAKE 1 TABLET BY MOUTH DAILY AT BEDTIME, Disp: 90 tablet, Rfl: 1    albuterol (PROVENTIL HFA,VENTOLIN HFA) 90 mcg/act inhaler, Inhale 2 puffs every 6 (six) hours as needed for wheezing (Patient not taking: Reported on 3/8/2021), Disp: 1 Inhaler, Rfl: 3    ALLERGIES:  Allergies   Allergen Reactions    Latex Rash    Neosporin [Neomycin-Bacitracin Zn-Polymyx] Rash and Other (See Comments)     hives per [facility] order       REVIEW OF SYSTEMS:  MSK: left leg pain  Neuro: negative  Pertinent items are otherwise noted in HPI  A comprehensive review of systems was otherwise negative  LABS:  HgA1c:   Lab Results   Component Value Date    HGBA1C 5 7 09/27/2018     BMP:   Lab Results   Component Value Date    GLUCOSE 101 10/09/2018    CALCIUM 9 6 02/17/2021    K 5 0 02/17/2021    CO2 31 02/17/2021     02/17/2021    BUN 27 (H) 02/17/2021    CREATININE 0 99 02/17/2021     CBC: No components found for: CBC    _____________________________________________________  PHYSICAL EXAMINATION:  Vital signs: /70   Pulse 62   Ht 4' 6" (1 372 m)   Wt 101 kg (223 lb)   BMI 53 77 kg/m²   General: No acute distress, awake and alert  Psychiatric: Mood and affect appear appropriate  HEENT: Trachea Midline, No torticollis, no apparent facial trauma  Cardiovascular: No audible murmurs;  Extremities appear perfused  Pulmonary: No audible wheezing or stridor  Skin: No open lesions; see further details (if any) below    MUSCULOSKELETAL EXAMINATION:  Extremities:  LLE:  Negative stinchfield  No pain in hip with IR/ER  Straight leg raise produces lower leg pain  Extremity perfused  + ankle df/pf, ehl/fhl motor function      _____________________________________________________  STUDIES REVIEWED:  I personally reviewed the images and interpretation is as follows:  Plain films of left hip reveal hip implant with no evidence of subsidence or loosening; degenerative L spine changes apparent    PROCEDURES PERFORMED:  Procedures    Scribe Attestation    I,:  Bushra Torres am acting as a scribe while in the presence of the attending physician :       I,:  Otho Moritz, MD personally performed the services described in this documentation    as scribed in my presence :

## 2021-07-09 NOTE — TELEPHONE ENCOUNTER
Patient is callingin reference to pain medication that ws to be sent to the [facility] [address] in [facility]  Patient states the pharmacy has not received an order from Dr Allyssa Kirby for her      Please advise    Ted Landau [phone]

## 2021-07-10 PROBLEM — M54.16 RADICULOPATHY, LUMBAR REGION: Status: ACTIVE | Noted: 2021-07-10

## 2021-07-15 ENCOUNTER — TELEPHONE (OUTPATIENT)
Dept: PAIN MEDICINE | Facility: CLINIC | Age: 81
End: 2021-07-15

## 2021-07-23 ENCOUNTER — CONSULT (OUTPATIENT)
Dept: PAIN MEDICINE | Facility: CLINIC | Age: 81
End: 2021-07-23
Payer: MEDICARE

## 2021-07-23 VITALS
HEIGHT: 55 IN | DIASTOLIC BLOOD PRESSURE: 56 MMHG | HEART RATE: 62 BPM | BODY MASS INDEX: 53.77 KG/M2 | SYSTOLIC BLOOD PRESSURE: 104 MMHG

## 2021-07-23 DIAGNOSIS — M54.16 RADICULOPATHY, LUMBAR REGION: Primary | ICD-10-CM

## 2021-07-23 DIAGNOSIS — M54.42 CHRONIC BILATERAL LOW BACK PAIN WITH LEFT-SIDED SCIATICA: ICD-10-CM

## 2021-07-23 DIAGNOSIS — G89.29 CHRONIC BILATERAL LOW BACK PAIN WITH LEFT-SIDED SCIATICA: ICD-10-CM

## 2021-07-23 PROCEDURE — 99214 OFFICE O/P EST MOD 30 MIN: CPT | Performed by: STUDENT IN AN ORGANIZED HEALTH CARE EDUCATION/TRAINING PROGRAM

## 2021-07-23 RX ORDER — CELECOXIB 100 MG/1
100 CAPSULE ORAL 2 TIMES DAILY PRN
Qty: 60 CAPSULE | Refills: 0 | Status: SHIPPED | OUTPATIENT
Start: 2021-07-23 | End: 2022-05-09 | Stop reason: ALTCHOICE

## 2021-07-23 NOTE — PATIENT INSTRUCTIONS
1  Take Tylenol 1,000mg up to three times a day as needed    Magnetic Resonance Imaging   WHAT YOU NEED TO KNOW:   Magnetic resonance imaging (MRI) is a test that uses magnetic fields and radio waves to take pictures inside your body  An MRI is used to see blood vessels, tissue, muscles, and bones  It can also show organs, such as your heart, lungs, or liver  An MRI can help your healthcare provider diagnose or treat a medical condition  It does not use radiation  DISCHARGE INSTRUCTIONS:   Call your local emergency number (911 in the [address] [place] [address],[address]) if:   · You have signs of an allergic reaction to the contrast liquid, such as trouble breathing, swelling of your mouth or face, or fainting  Return to the emergency department if:   · You are dizzy or feel faint  · You have a rash, itching, or swollen skin  · You have nausea or are vomiting  · You are suddenly urinating less than usual     Call your doctor if:   · You have questions or concerns about your condition or care  Drink liquids as directed:  Liquids will help flush the contrast liquid out of your body  Ask how much liquid to drink after your MRI, and which liquids to drink  Follow up with your doctor as directed:  Write down your questions so you remember to ask them during your visits  © Copyright [employer] 2021 Information is for End User's use only and may not be sold, redistributed or otherwise used for commercial purposes  All illustrations and images included in CareNotes® are the copyrighted property of [employer]  or Ame Mcgill  The above information is an  only  It is not intended as medical advice for individual conditions or treatments  Talk to your doctor, nurse or pharmacist before following any medical regimen to see if it is safe and effective for you

## 2021-07-23 NOTE — PROGRESS NOTES
Assessment  1  Radiculopathy, lumbar region    2  Chronic bilateral low back pain with left-sided sciatica        Plan   This is an 69-year-old female who presents to our office she point of bilateral low back pain with left lower extremity radiculopathy  She has positive lumbar facet loading bilaterally  Part of the exam was limited today as patient was unable to get up onto the exam table  We discussed initiating physical therapy to help with her mobility and core strength  Additionally, given radiculopathy symptoms with noted focal weakness of left foot dorsiflexion, will order MRI of the lumbar spine to assess for any compressive pathology  This will help determine next course fashion which may include epidural steroid injection  She will return in 6 weeks or sooner if medically necessary  Additionally, we will start the patient on Celebrex 100 mg b i d  p r n     Advised patient to take the medication only as needed  She may also supplement with acetaminophen 1000 mg t i d  p r n    The patient was cautioned about possible side effects and risks of NSAID medications including stomach upset, stomach ulcers, kidney risks and cardiovascular risks to include hypertension and slightly increased risks of stroke and MI  Also discussed was ways to minimize these risks by taking this medication with food, staying well-hydrated, watching for any hypertension, and taking the medication with a stomach protectant such as pepcid, zantac, or a PPI  [place] Prescription Drug Monitoring Program report was reviewed and was appropriate     My impressions and treatment recommendations were discussed in detail with the patient who verbalized understanding and had no further questions  Discharge instructions were provided  I personally saw and examined the patient and I agree with the above discussed plan of care      Orders Placed This Encounter   Procedures    MRI lumbar spine without contrast     OPEN MRI Standing Status:   Future     Standing Expiration Date:   7/23/2025     Scheduling Instructions: There is no preparation for this test  Please leave your jewelry and valuables at home, wedding rings are the exception  Magnetic nail polish must be removed prior to arrival for your test  Please bring your insurance cards, a form of photo ID and a list of your medications with you  Arrive 15 minutes prior to your appointment time in order to register  Please bring any prior CT or MRI studies of this area that were not performed at a [facility]  To schedule this appointment, please contact Central Scheduling at [phone]  Prior to your appointment, please make sure you complete the MRI Screening Form when you e-Check in for your appointment  This will be available starting 7 days before your appointment in [address]  You may receive an e-mail with an activation code if you do not have a [employer] account  If you do not have access to a device, we will complete your screening at your appointment  Order Specific Question:   What is the patient's sedation requirement? Answer:   No Sedation     Order Specific Question:   Release to patient through [employer]     Answer:   Immediate     Order Specific Question:   Is order priority selected as STAT?      Answer:   No     Order Specific Question:   Reason for Exam (FREE TEXT)     Answer:   left lumbar radiculopathy    Ambulatory referral to Physical Therapy     Standing Status:   Future     Standing Expiration Date:   7/23/2022     Referral Priority:   Routine     Referral Type:   Physical Therapy     Referral Reason:   Specialty Services Required     Requested Specialty:   Physical Therapy     Number of Visits Requested:   1     Expiration Date:   7/23/2022     New Medications Ordered This Visit   Medications    celecoxib (CeleBREX) 100 mg capsule     Sig: Take 1 capsule (100 mg total) by mouth 2 (two) times a day as needed for moderate pain     Dispense:  60 capsule     Refill:  0       History of Present Illness    Referring Provider: Keyonna Salter MD    Елена Rendon is a 79 y o  female who presents with a chief complaint of low back pain radiating to left lower extremity  Pain starts in the back and radiates down buttock and back of left thigh and occasionally past the knee  I have personally reviewed and/or updated the patient's past medical history, past surgical history, family history, social history, current medications, allergies, and vital signs today  Review of Systems   Constitutional: Negative for fever and unexpected weight change  HENT: Negative for trouble swallowing  Eyes: Negative for visual disturbance  Respiratory: Positive for shortness of breath and wheezing  Cardiovascular: Negative for chest pain and palpitations  Gastrointestinal: Negative for constipation, diarrhea, nausea and vomiting  Endocrine: Negative for cold intolerance, heat intolerance and polydipsia  Genitourinary: Negative for difficulty urinating and frequency  Musculoskeletal: Positive for myalgias  Negative for arthralgias, gait problem and joint swelling  Skin: Negative for rash  Neurological: Negative for dizziness, seizures, syncope, weakness and headaches  Hematological: Does not bruise/bleed easily  Psychiatric/Behavioral: Positive for dysphoric mood         Patient Active Problem List   Diagnosis    Hypothyroid    Hypertension    Hyperlipidemia    Reactive airway disease without complication    JANICE (obstructive sleep apnea)    LVH (left ventricular hypertrophy)    Mitral annular calcification    Mitral valve stenosis    Pulmonary hypertension (HCC)    Chronic diastolic (congestive) heart failure (HCC)    Anemia    Obesity, morbid (HCC)    Gastroesophageal reflux disease without esophagitis    Arthritis    AVB (atrioventricular block)    COPD, mild (HCC)    Coronary artery disease    JANICE on CPAP    S/P TAVR (transcatheter aortic valve replacement)    Acute pulmonary insufficiency    Postoperative anemia due to acute blood loss    Encounter for postoperative care    Depression with anxiety    Chronic otitis media    Insomnia    SOB (shortness of breath)    Anxiety    Class 3 severe obesity with serious comorbidity and body mass index (BMI) of 50 0 to 59 9 in adult ([facility])    Fatigue    Osteopenia    Depression, recurrent ([facility])    Chronic hypoxemic respiratory failure ([facility])    Radiculopathy, lumbar region       Past Medical History:   Diagnosis Date    Anemia 08/22/2018    Anxiety     Arthritis     AVB (atrioventricular block)     first degree    Cataract     CHF (congestive heart failure) ([facility])     COPD, mild ([facility])     Coronary artery disease     Dislocation of right shoulder joint     Frequent UTI     GERD (gastroesophageal reflux disease)     H/O: pneumonia     Heme positive stool     Hyperlipidemia     Hypertension     Hypothyroidism     Morbid obesity with BMI of 50 0-59 9, adult ([facility]     Obesity, morbid ([facility] ) 08/22/2018    JANICE on CPAP     Pulmonary hypertension ([facility] ) 08/22/2018    Severe aortic stenosis     Simple goiter     Skin cyst     within the armpits, right    Wears glasses        Past Surgical History:   Procedure Laterality Date    BREAST BIOPSY      CARDIAC CATHETERIZATION      CARPAL TUNNEL RELEASE Bilateral     CHOLECYSTECTOMY      DILATION AND CURETTAGE OF UTERUS      HYSTEROSCOPY      MASTOID SURGERY      WY COLONOSCOPY FLX DX W/COLLJ SPEC WHEN PFRMD N/A 9/6/2018    Procedure: COLONOSCOPY;  Surgeon: Eleanor Abad MD;  [facility]: [facility]; Service: Gastroenterology    WY ECHO TRANSESOPHAG R-T 2D W/PRB IMG ACQUISJ I&R N/A 10/9/2018    Procedure: INTRA-OP TRANSESOPHAGEAL ECHOCARDIOGRAM (GARRISON);   Surgeon: Kandace Prader, DO;  Location:  MAIN OR;  Service: Cardiac Surgery    WY ESOPHAGOGASTRODUODENOSCOPY TRANSORAL DIAGNOSTIC N/A 8/31/2018    Procedure: ESOPHAGOGASTRODUODENOSCOPY (EGD); Surgeon: Amilcar Alvarado MD;  [facility]; Service: Gastroenterology    LA REPLACE AORTIC VALVE OPENFEMORAL ARTERY APPROACH N/A 10/9/2018    Procedure: REPLACEMENT AORTIC VALVE TRANSCATHETER (TAVR) TRANSFEMORAL W/ 23 MM MENDOZA NOE S3 VALVE (ACCESS OF LEFT); Surgeon: Carlos Hodges DO;  [facility];  Service: Cardiac Surgery    TOTAL HIP ARTHROPLASTY Left 2007    TOTAL KNEE ARTHROPLASTY Bilateral        Family History   Problem Relation Age of Onset    Diabetes Mother     Stroke Mother     Cancer Father     Lung cancer Father     Diabetes Sister     Heart disease Sister     Hypertension Sister     Coronary artery disease Family     Diabetes Family     Hypertension Family     Cancer Family     Stroke Family     Thyroid disease Neg Hx        Social History     Occupational History    Not on file   Tobacco Use    Smoking status: Never Smoker    Smokeless tobacco: Never Used   Vaping Use    Vaping Use: Never used   Substance and Sexual Activity    Alcohol use: No    Drug use: No    Sexual activity: Never       Current Outpatient Medications on File Prior to Visit   Medication Sig    Advair Diskus 250-50 MCG/DOSE inhaler INHALE 1 PUFF 2 (TWO) TIMES A DAY RINSE MOUTH AFTER USE      albuterol (2 5 mg/3 mL) 0 083 % nebulizer solution Take 1 vial (2 5 mg total) by nebulization every 6 (six) hours as needed for wheezing or shortness of breath    alendronate (Fosamax) 70 mg tablet Take 1 tablet (70 mg total) by mouth every 7 days    aspirin (ECOTRIN LOW STRENGTH) 81 mg EC tablet Take 1 tablet (81 mg total) by mouth daily    b complex vitamins capsule Take 1 capsule by mouth 2 (two) times a day      Calcium Carb-Cholecalciferol (CALCIUM 600 + D PO) Take 1 tablet by mouth 2 (two) times a day    clotrimazole-betamethasone (LOTRISONE) 1-0 05 % cream Apply topically 2 (two) times a day    Cranberry 250 MG TABS Take by mouth    Fesoterodine Fumarate ER (Toviaz) 8 MG TB24 Take 8 mg by mouth daily     levothyroxine 50 mcg tablet TAKE 1 TABLET BY MOUTH EVERY DAY    loratadine (CLARITIN) 10 mg tablet TAKE 1 TABLET BY MOUTH EVERY DAY    methylPREDNISolone 4 MG tablet therapy pack Use as directed on package    metoprolol succinate (TOPROL-XL) 25 mg 24 hr tablet TAKE 1 TABLET BY MOUTH EVERY DAY    mometasone (ELOCON) 0 1 % cream APPLY TO THE RIGHT EAR CANAL TWICE DAILY FOR 2 WEEKS, THEN DECREASE TO ONCE AT BEDTIME OR AS NEEDED    olmesartan (BENICAR) 5 mg tablet Take 1 tablet (5 mg total) by mouth daily    omeprazole (PriLOSEC) 40 MG capsule TAKE 1 CAPSULE BY MOUTH TWICE A DAY    sertraline (ZOLOFT) 100 mg tablet TAKE 1 TABLET BY MOUTH EVERY DAY    simvastatin (ZOCOR) 40 mg tablet TAKE 1 TABLET BY MOUTH DAILY AT BEDTIME    albuterol (PROVENTIL HFA,VENTOLIN HFA) 90 mcg/act inhaler Inhale 2 puffs every 6 (six) hours as needed for wheezing (Patient not taking: Reported on 7/23/2021)     No current facility-administered medications on file prior to visit  Allergies   Allergen Reactions    Latex Rash    Neosporin [Neomycin-Bacitracin Zn-Polymyx] Rash and Other (See Comments)     hives per [facility] order       Physical Exam    /56   Pulse 62   Ht 4' 6" (1 372 m)   Breastfeeding No   BMI 53 77 kg/m²     Constitutional: normal, well developed, well nourished, alert, in no distress and non-toxic and no overt pain behavior    Eyes: anicteric  HEENT: grossly intact  Neck: supple, symmetric, trachea midline and no masses   Pulmonary:even and unlabored  Cardiovascular:No edema or pitting edema present  Skin:Normal without rashes or lesions and well hydrated  Psychiatric:Mood and affect appropriate  Neurologic:Cranial Nerves II-XII grossly intact  Musculoskeletal:ambulates with walker     Lumbar Spine Exam    Appearance:  Normal lordosis  Palpation/Tenderness:  no tenderness or spasm  Sensory:  no sensory deficits noted  Range of Motion:  Lumbar facet loading positive bilaterally  Motor Strength:  Left hip flexion:  4/5  Left hip extension:  5/5  Right hip flexion:  5/5  Right hip extension:  5/5  Left knee flexion:  5/5  Left knee extension:  5/5  Right knee flexion:  5/5  Right knee extension:  5/5  Left foot dorsiflexion:  4/5  Left foot plantar flexion:  5/5  Right foot dorsiflexion:  5/5  Right foot plantar flexion:  5/5  Reflexes:  Difficult to assess as patient unable to get onto exam table  Special Tests:  Left Straight Leg Test:  positive  Right Straight Leg Test:  negative      DIAGNOSTIC IMAGING AND TEST RESULTS:  Xray Hip   LEFT HIP     INDICATION:   M25 552: Pain in left hip      COMPARISON:  None     VIEWS:  XR HIP/PELV 2-3 VWS LEFT  W PELVIS IF PERFORMED         FINDINGS:     Left hip total arthroplasty demonstrates anatomic alignment without hardware complication      There is no acute fracture or dislocation      Mild degenerative right hip joint      No lytic or blastic osseous lesion      Soft tissues are unremarkable      Degenerative scoliotic lower lumbar spine      IMPRESSION:     No acute osseous abnormality

## 2021-07-26 ENCOUNTER — TELEPHONE (OUTPATIENT)
Dept: PAIN MEDICINE | Facility: CLINIC | Age: 81
End: 2021-07-26

## 2021-08-01 DIAGNOSIS — F41.8 DEPRESSION WITH ANXIETY: ICD-10-CM

## 2021-08-01 RX ORDER — SERTRALINE HYDROCHLORIDE 100 MG/1
TABLET, FILM COATED ORAL
Qty: 90 TABLET | Refills: 0 | Status: SHIPPED | OUTPATIENT
Start: 2021-08-01 | End: 2021-10-24

## 2021-08-02 ENCOUNTER — EVALUATION (OUTPATIENT)
Dept: PHYSICAL THERAPY | Facility: CLINIC | Age: 81
End: 2021-08-02
Payer: MEDICARE

## 2021-08-02 DIAGNOSIS — G89.29 CHRONIC BILATERAL LOW BACK PAIN WITH LEFT-SIDED SCIATICA: Primary | ICD-10-CM

## 2021-08-02 DIAGNOSIS — M54.42 CHRONIC BILATERAL LOW BACK PAIN WITH LEFT-SIDED SCIATICA: Primary | ICD-10-CM

## 2021-08-02 PROCEDURE — 97162 PT EVAL MOD COMPLEX 30 MIN: CPT | Performed by: PHYSICAL THERAPIST

## 2021-08-02 NOTE — PROGRESS NOTES
PT Evaluation  and PT Discharge    Today's date: 2021  Patient name: Maryann Samaniego  : 1940  MRN: [id number]  Referring provider: Sara Juan MD  Dx:   Encounter Diagnosis     ICD-10-CM    1  Chronic bilateral low back pain with left-sided sciatica  M54 42 Ambulatory referral to Physical Therapy    G89 29                   Assessment  Assessment details: Pt is a 80 y/o female presenting to physical therapy with chief complaint of LBP that radiates down the LLE  She presents with limited lumbar AROM in all planes, with pain in all planes  Pt had difficulty getting on and off the plinth, and had labored breathing with walking approx 10ft and getting on the plinth  PT recommends home physical therapy based on her decreased endurance and decreased mobility, she would benefit more from physical therapy at home to work on functional tasks  Pt was agreeable to this  PT contacted referring MD for a script for home physical therapy, which he placed in the system  PT called pt and left a message informing her of this and a number for a home buddy agency  No return phone call  Pt DC from skilled outpatient physical therapy  Impairments: abnormal gait, abnormal or restricted ROM, activity intolerance, impaired balance, impaired physical strength, lacks appropriate home exercise program and pain with function  Functional limitations: ADLs  Symptom irritability: moderateUnderstanding of Dx/Px/POC: good   Prognosis: good    Goals  ST weeks  1  Pt will demonstrate independence with HEP  2  Pt will improve lumbar AROM by 10%  3  Pt will improve BLE strength by at least 1/2 grade  4  Pt will report pain no more than 5/10  5  Pt will generate proper TA contraction without cuing to show improved NM control    LT weeks  1  Pt will improve lumbar AROM to at least 80% in all directions  2  Pt will report pain no more than 2/10  3   Pt will be able to stand/sit for at least 30 minutes without pain to return to PLOF         Plan  Plan details: Referral necessary to home physical therapy  Patient would benefit from: skilled physical therapy  Referral necessary: Yes  Treatment plan discussed with: patient        Subjective Evaluation    History of Present Illness  Mechanism of injury: Pt reports she has had LBP and pain down her LLE for the past couple months  She reports sometimes the pain will go all the way down to her foot, and she describes it as sharp  She reports she notices the pain with getting out and with sitting to standing  She takes Tylenol for the pain pretty consistently for the pain  She reports scrubbing, sweeping, vacuuming, and bending over to  the laundry is most painful  She feels weak in both legs, but the L more than the R             Recurrent probem    Quality of life: good    Pain  At best pain ratin  At worst pain rating: 10  Quality: sharp, dull ache and discomfort  Relieving factors: medications  Aggravating factors: standing, walking, sitting, stair climbing and lifting  Progression: no change    Treatments  Previous treatment: medication  Patient Goals  Patient goals for therapy: increased strength, independence with ADLs/IADLs, increased motion and decreased pain          Objective     Active Range of Motion     Lumbar   Flexion:  with pain Restriction level: minimal  Extension:  with pain Restriction level: moderate  Left lateral flexion:  with pain Restriction level: minimal  Right lateral flexion:  with pain Restriction level: minimal    Strength/Myotome Testing     Lumbar   Left   Normal strength    Right   Normal strength    General Comments:      Lumbar Comments  Pt demonstrated labored breathing after getting on the plinth             Precautions: HTN      Manuals                                                                 Neuro Re-Ed                                                                                                        Ther Ex Ther Activity                                       Gait Training                                       Modalities

## 2021-08-04 ENCOUNTER — TELEPHONE (OUTPATIENT)
Dept: RADIOLOGY | Facility: CLINIC | Age: 81
End: 2021-08-04

## 2021-08-04 NOTE — TELEPHONE ENCOUNTER
Advised pt of same  Pt agreeable to [facility]  Please place order  Will provide pt with contact # for visiting nurses   [phone]

## 2021-08-04 NOTE — TELEPHONE ENCOUNTER
----- Message from Kike Orozco MD sent at 8/3/2021  4:18 PM EDT -----  Patient has severe spinal stenosis at L4-5 and has degenerative changes throughout  She would be candidate for LESI  PT also is recommending home PT due to her disability   Order placed   ----- Message -----  From: Interface, Transcription Incoming  Sent: 8/2/2021   9:07 AM EDT  To: Kike Orozco MD

## 2021-08-05 NOTE — TELEPHONE ENCOUNTER
Sabrina from [facility] states you sent referral to outpatient Therapy that is not home health  [facility] are currently restricted not taking new patients       She does recommend [facility] (OUTPATIENT CAMP[facility])   Fax # [phone]     Thank you

## 2021-08-06 NOTE — TELEPHONE ENCOUNTER
Conception Nito from [facility] called to make us aware they received our referral and have scheduled the patient       Thank you

## 2021-08-06 NOTE — TELEPHONE ENCOUNTER
S/w pt  Pt would like to go to [facility] as she has used them bfore  Advised pt I would fax script over  Phone #[phone]  Fax # [phone]  Jenn     Scheduled pt for LESI August 17th 220pm  Pt aware to have a

## 2021-08-10 ENCOUNTER — OFFICE VISIT (OUTPATIENT)
Dept: FAMILY MEDICINE CLINIC | Facility: CLINIC | Age: 81
End: 2021-08-10
Payer: MEDICARE

## 2021-08-10 VITALS
RESPIRATION RATE: 18 BRPM | DIASTOLIC BLOOD PRESSURE: 60 MMHG | HEART RATE: 72 BPM | BODY MASS INDEX: 50.64 KG/M2 | HEIGHT: 55 IN | SYSTOLIC BLOOD PRESSURE: 120 MMHG | WEIGHT: 218.8 LBS | TEMPERATURE: 97 F | OXYGEN SATURATION: 89 %

## 2021-08-10 DIAGNOSIS — Z12.12 SCREENING FOR COLORECTAL CANCER: ICD-10-CM

## 2021-08-10 DIAGNOSIS — Z00.00 MEDICARE ANNUAL WELLNESS VISIT, SUBSEQUENT: Primary | ICD-10-CM

## 2021-08-10 DIAGNOSIS — Z12.11 SCREENING FOR COLORECTAL CANCER: ICD-10-CM

## 2021-08-10 DIAGNOSIS — N18.31 STAGE 3A CHRONIC KIDNEY DISEASE (HCC): ICD-10-CM

## 2021-08-10 PROCEDURE — G0439 PPPS, SUBSEQ VISIT: HCPCS | Performed by: FAMILY MEDICINE

## 2021-08-10 PROCEDURE — 1123F ACP DISCUSS/DSCN MKR DOCD: CPT | Performed by: FAMILY MEDICINE

## 2021-08-10 NOTE — PATIENT INSTRUCTIONS
Blood pressure today is at goal   Please read over the following instructions  Please do your stool specimen and have the labs done  Medicare Preventive Visit Patient Instructions  Thank you for completing your Welcome to Medicare Visit or Medicare Annual Wellness Visit today  Your next wellness visit will be due in one year (8/11/2022)  The screening/preventive services that you may require over the next 5-10 years are detailed below  Some tests may not apply to you based off risk factors and/or age  Screening tests ordered at today's visit but not completed yet may show as past due  Also, please note that scanned in results may not display below  Preventive Screenings:  Service Recommendations Previous Testing/Comments   Colorectal Cancer Screening  * Colonoscopy    * Fecal Occult Blood Test (FOBT)/Fecal Immunochemical Test (FIT)  * Fecal DNA/Cologuard Test  * Flexible Sigmoidoscopy Age: 54-65 years old   Colonoscopy: every 10 years (may be performed more frequently if at higher risk)  OR  FOBT/FIT: every 1 year  OR  Cologuard: every 3 years  OR  Sigmoidoscopy: every 5 years  Screening may be recommended earlier than age 48 if at higher risk for colorectal cancer  Also, an individualized decision between you and your healthcare provider will decide whether screening between the ages of 74-80 would be appropriate  Colonoscopy: Not on file  FOBT/FIT: 11/08/2018  Cologuard: Not on file  Sigmoidoscopy: Not on file          Breast Cancer Screening Age: 36 years old  Frequency: every 1-2 years  Not required if history of left and right mastectomy Mammogram: Not on file        Cervical Cancer Screening Between the ages of 21-29, pap smear recommended once every 3 years  Between the ages of 33-67, can perform pap smear with HPV co-testing every 5 years     Recommendations may differ for women with a history of total hysterectomy, cervical cancer, or abnormal pap smears in past  Pap Smear: Not on file    Screening Not Indicated   Hepatitis C Screening Once for adults born between 1945 and 1965  More frequently in patients at high risk for Hepatitis C Hep C Antibody: Not on file        Diabetes Screening 1-2 times per year if you're at risk for diabetes or have pre-diabetes Fasting glucose: 82 mg/dL   A1C: 5 7 %    Screening Current   Cholesterol Screening Once every 5 years if you don't have a lipid disorder  May order more often based on risk factors  Lipid panel: 02/17/2021    Screening Not Indicated  History Lipid Disorder     Other Preventive Screenings Covered by Medicare:  1  Abdominal Aortic Aneurysm (AAA) Screening: covered once if your at risk  You're considered to be at risk if you have a family history of AAA  2  Lung Cancer Screening: covers low dose CT scan once per year if you meet all of the following conditions: (1) Age 50-69; (2) No signs or symptoms of lung cancer; (3) Current smoker or have quit smoking within the last 15 years; (4) You have a tobacco smoking history of at least 30 pack years (packs per day multiplied by number of years you smoked); (5) You get a written order from a healthcare provider  3  Glaucoma Screening: covered annually if you're considered high risk: (1) You have diabetes OR (2) Family history of glaucoma OR (3)  aged 48 and older OR (3)  American aged 72 and older  3  Osteoporosis Screening: covered every 2 years if you meet one of the following conditions: (1) You're estrogen deficient and at risk for osteoporosis based off medical history and other findings; (2) Have a vertebral abnormality; (3) On glucocorticoid therapy for more than 3 months; (4) Have primary hyperparathyroidism; (5) On osteoporosis medications and need to assess response to drug therapy  · Last bone density test (DXA Scan): 08/20/2020   5  HIV Screening: covered annually if you're between the age of 15-65   Also covered annually if you are younger than 13 and older than 72 with risk factors for HIV infection  For pregnant patients, it is covered up to 3 times per pregnancy  Immunizations:  Immunization Recommendations   Influenza Vaccine Annual influenza vaccination during flu season is recommended for all persons aged >= 6 months who do not have contraindications   Pneumococcal Vaccine (Prevnar and Pneumovax)  * Prevnar = PCV13  * Pneumovax = PPSV23   Adults 25-60 years old: 1-3 doses may be recommended based on certain risk factors  Adults 72 years old: Prevnar (PCV13) vaccine recommended followed by Pneumovax (PPSV23) vaccine  If already received PPSV23 since turning 65, then PCV13 recommended at least one year after PPSV23 dose  Hepatitis B Vaccine 3 dose series if at intermediate or high risk (ex: diabetes, end stage renal disease, liver disease)   Tetanus (Td) Vaccine - COST NOT COVERED BY MEDICARE PART B Following completion of primary series, a booster dose should be given every 10 years to maintain immunity against tetanus  Td may also be given as tetanus wound prophylaxis  Tdap Vaccine - COST NOT COVERED BY MEDICARE PART B Recommended at least once for all adults  For pregnant patients, recommended with each pregnancy  Shingles Vaccine (Shingrix) - COST NOT COVERED BY MEDICARE PART B  2 shot series recommended in those aged 48 and above     Health Maintenance Due:  There are no preventive care reminders to display for this patient  Immunizations Due:      Topic Date Due    Influenza Vaccine (1) 09/01/2021     Advance Directives   What are advance directives? Advance directives are legal documents that state your wishes and plans for medical care  These plans are made ahead of time in case you lose your ability to make decisions for yourself  Advance directives can apply to any medical decision, such as the treatments you want, and if you want to donate organs  What are the types of advance directives?   There are many types of advance directives, and each state has rules about how to use them  You may choose a combination of any of the following:  · Living will: This is a written record of the treatment you want  You can also choose which treatments you do not want, which to limit, and which to stop at a certain time  This includes surgery, medicine, IV fluid, and tube feedings  · Durable power of  for healthcare [facility]): This is a written record that states who you want to make healthcare choices for you when you are unable to make them for yourself  This person, called a proxy, is usually a family member or a friend  You may choose more than 1 proxy  · Do not resuscitate (DNR) order:  A DNR order is used in case your heart stops beating or you stop breathing  It is a request not to have certain forms of treatment, such as CPR  A DNR order may be included in other types of advance directives  · Medical directive: This covers the care that you want if you are in a coma, near death, or unable to make decisions for yourself  You can list the treatments you want for each condition  Treatment may include pain medicine, surgery, blood transfusions, dialysis, IV or tube feedings, and a ventilator (breathing machine)  · Values history: This document has questions about your views, beliefs, and how you feel and think about life  This information can help others choose the care that you would choose  Why are advance directives important? An advance directive helps you control your care  Although spoken wishes may be used, it is better to have your wishes written down  Spoken wishes can be misunderstood, or not followed  Treatments may be given even if you do not want them  An advance directive may make it easier for your family to make difficult choices about your care  Urinary Incontinence   Urinary incontinence (UI)  is when you lose control of your bladder  UI develops because your bladder cannot store or empty urine properly   The 3 most common types of UI are stress incontinence, urge incontinence, or both  Medicines:   · May be given to help strengthen your bladder control  Report any side effects of medication to your healthcare provider  Do pelvic muscle exercises often:  Your pelvic muscles help you stop urinating  Squeeze these muscles tight for 5 seconds, then relax for 5 seconds  Gradually work up to squeezing for 10 seconds  Do 3 sets of 15 repetitions a day, or as directed  This will help strengthen your pelvic muscles and improve bladder control  Train your bladder:  Go to the bathroom at set times, such as every 2 hours, even if you do not feel the urge to go  You can also try to hold your urine when you feel the urge to go  For example, hold your urine for 5 minutes when you feel the urge to go  As that becomes easier, hold your urine for 10 minutes  Self-care:   · Keep a UI record  Write down how often you leak urine and how much you leak  Make a note of what you were doing when you leaked urine  · Drink liquids as directed  You may need to limit the amount of liquid you drink to help control your urine leakage  Do not drink any liquid right before you go to bed  Limit or do not have drinks that contain caffeine or alcohol  · Prevent constipation  Eat a variety of high-fiber foods  Good examples are high-fiber cereals, beans, vegetables, and whole-grain breads  Walking is the best way to trigger your intestines to have a bowel movement  · Exercise regularly and maintain a healthy weight  Weight loss and exercise will decrease pressure on your bladder and help you control your leakage  · Use a catheter as directed  to help empty your bladder  A catheter is a tiny, plastic tube that is put into your bladder to drain your urine  · Go to behavior therapy as directed  Behavior therapy may be used to help you learn to control your urge to urinate      Weight Management   Why it is important to manage your weight:  Being overweight increases your risk of health conditions such as heart disease, high blood pressure, type 2 diabetes, and certain types of cancer  It can also increase your risk for osteoarthritis, sleep apnea, and other respiratory problems  Aim for a slow, steady weight loss  Even a small amount of weight loss can lower your risk of health problems  How to lose weight safely:  A safe and healthy way to lose weight is to eat fewer calories and get regular exercise  You can lose up about 1 pound a week by decreasing the number of calories you eat by 500 calories each day  Healthy meal plan for weight management:  A healthy meal plan includes a variety of foods, contains fewer calories, and helps you stay healthy  A healthy meal plan includes the following:  · Eat whole-grain foods more often  A healthy meal plan should contain fiber  Fiber is the part of grains, fruits, and vegetables that is not broken down by your body  Whole-grain foods are healthy and provide extra fiber in your diet  Some examples of whole-grain foods are whole-wheat breads and pastas, oatmeal, brown rice, and bulgur  · Eat a variety of vegetables every day  Include dark, leafy greens such as spinach, kale, marlo greens, and mustard greens  Eat yellow and orange vegetables such as carrots, sweet potatoes, and winter squash  · Eat a variety of fruits every day  Choose fresh or canned fruit (canned in its own juice or light syrup) instead of juice  Fruit juice has very little or no fiber  · Eat low-fat dairy foods  Drink fat-free (skim) milk or 1% milk  Eat fat-free yogurt and low-fat cottage cheese  Try low-fat cheeses such as mozzarella and other reduced-fat cheeses  · Choose meat and other protein foods that are low in fat  Choose beans or other legumes such as split peas or lentils  Choose fish, skinless poultry (chicken or turkey), or lean cuts of red meat (beef or pork)  Before you cook meat or poultry, cut off any visible fat  · Use less fat and oil    Try baking foods instead of frying them  Add less fat, such as margarine, sour cream, regular salad dressing and mayonnaise to foods  Eat fewer high-fat foods  Some examples of high-fat foods include french fries, doughnuts, ice cream, and cakes  · Eat fewer sweets  Limit foods and drinks that are high in sugar  This includes candy, cookies, regular soda, and sweetened drinks  Exercise:  Exercise at least 30 minutes per day on most days of the week  Some examples of exercise include walking, biking, dancing, and swimming  You can also fit in more physical activity by taking the stairs instead of the elevator or parking farther away from stores  Ask your healthcare provider about the best exercise plan for you  © Copyright [employer] 2018 Information is for End User's use only and may not be sold, redistributed or otherwise used for commercial purposes   All illustrations and images included in CareNotes® are the copyrighted property of [employer]  or [address] [place] [address]

## 2021-08-10 NOTE — PROGRESS NOTES
Assessment and Plan:     Problem List Items Addressed This Visit     None      Visit Diagnoses     Medicare annual wellness visit, subsequent    -  Primary    Pt does not want the shingles shot  Screening for colorectal cancer        Relevant Orders    Occult Blood, Fecal Immunochemical (FIT)    Stage 3a chronic kidney disease (HCC)        Relevant Orders    Comprehensive metabolic panel    Microalbumin / creatinine urine ratio    Urinalysis with microscopic           Preventive health issues were discussed with patient, and age appropriate screening tests were ordered as noted in patient's After Visit Summary  Personalized health advice and appropriate referrals for health education or preventive services given if needed, as noted in patient's After Visit Summary       History of Present Illness:     Patient presents for Medicare Annual Wellness visit    Patient Care Team:  Deja Coburn as PCP - General (Family Medicine)  Tabitha Cardona MD as Endoscopist     Problem List:     Patient Active Problem List   Diagnosis    Hypothyroid    Hypertension    Hyperlipidemia    Reactive airway disease without complication    JANICE (obstructive sleep apnea)    LVH (left ventricular hypertrophy)    Mitral annular calcification    Mitral valve stenosis    Pulmonary hypertension (HCC)    Chronic diastolic (congestive) heart failure (HCC)    Anemia    Obesity, morbid ([address] )    Gastroesophageal reflux disease without esophagitis    Arthritis    AVB (atrioventricular block)    COPD, mild (HCC)    Coronary artery disease    JANCIE on CPAP    S/P TAVR (transcatheter aortic valve replacement)    Acute pulmonary insufficiency    Postoperative anemia due to acute blood loss    Encounter for postoperative care    Depression with anxiety    Chronic otitis media    Insomnia    SOB (shortness of breath)    Anxiety    Class 3 severe obesity with serious comorbidity and body mass index (BMI) of 50 0 to 59 9 in adult [facility])    Fatigue    Osteopenia    Depression, recurrent (HCC)    Chronic hypoxemic respiratory failure (HCC)    Radiculopathy, lumbar region      Past Medical and Surgical History:     Past Medical History:   Diagnosis Date    Anemia 08/22/2018    Anxiety     Arthritis     AVB (atrioventricular block)     first degree    Cataract     CHF (congestive heart failure) (HCC)     COPD, mild (HCC)     Coronary artery disease     Dislocation of right shoulder joint     Frequent UTI     GERD (gastroesophageal reflux disease)     H/O: pneumonia     Heme positive stool     Hyperlipidemia     Hypertension     Hypothyroidism     Morbid obesity with BMI of 50 0-59 9, adult ([facility] )     Obesity, morbid ([facility] ) 08/22/2018    JANICE on CPAP     Pulmonary hypertension ([facility] ) 08/22/2018    Severe aortic stenosis     Simple goiter     Skin cyst     within the armpits, right    Wears glasses      Past Surgical History:   Procedure Laterality Date    BREAST BIOPSY      CARDIAC CATHETERIZATION      CARPAL TUNNEL RELEASE Bilateral     CHOLECYSTECTOMY      DILATION AND CURETTAGE OF UTERUS      HYSTEROSCOPY      MASTOID SURGERY      LA COLONOSCOPY FLX DX W/COLLJ SPEC WHEN PFRMD N/A 9/6/2018    Procedure: COLONOSCOPY;  Surgeon: Lakhwinder Mackay MD;  [facility]: [facility]; Service: Gastroenterology    LA ECHO TRANSESOPHAG R-T 2D W/PRB IMG ACQUISJ I&R N/A 10/9/2018    Procedure: INTRA-OP TRANSESOPHAGEAL ECHOCARDIOGRAM (GARRISON); Surgeon: Ja Wilkes DO;  Location:  MAIN OR;  Service: Cardiac Surgery    LA ESOPHAGOGASTRODUODENOSCOPY TRANSORAL DIAGNOSTIC N/A 8/31/2018    Procedure: ESOPHAGOGASTRODUODENOSCOPY (EGD); Surgeon: Lakhwinder Mackay MD;  [facility];   Service: Gastroenterology    LA REPLACE AORTIC VALVE OPENFEMORAL ARTERY APPROACH N/A 10/9/2018    Procedure: REPLACEMENT AORTIC VALVE TRANSCATHETER (TAVR) TRANSFEMORAL W/ 23 MM MENDOZA NOE S3 VALVE (ACCESS OF LEFT);  Surgeon: Nadya Kirby DO;  [facility];  Service: Cardiac Surgery    TOTAL HIP ARTHROPLASTY Left 2007    TOTAL KNEE ARTHROPLASTY Bilateral       Family History:     Family History   Problem Relation Age of Onset    Diabetes Mother     Stroke Mother     Cancer Father     Lung cancer Father     Diabetes Sister     Heart disease Sister     Hypertension Sister     Coronary artery disease Family     Diabetes Family     Hypertension Family     Cancer Family     Stroke Family     Thyroid disease Neg Hx       Social History:     Social History     Socioeconomic History    Marital status: Single     Spouse name: None    Number of children: None    Years of education: None    Highest education level: None   Occupational History    None   Tobacco Use    Smoking status: Never Smoker    Smokeless tobacco: Never Used   Vaping Use    Vaping Use: Never used   Substance and Sexual Activity    Alcohol use: No    Drug use: No    Sexual activity: Never   Other Topics Concern    None   Social History Narrative    Denied drinking coffee    Denied exercise habits    Most recent tobacco use screenin2018      Do you currently or have you served in the [employer]:   No      Were you activated, into active duty, as a member of the [employer] or as a Reservist:   No          Social Determinants of Health     Financial Resource Strain:     Difficulty of Paying Living Expenses:    Food Insecurity:     Worried About [facility] in the Last Year:     [facility] in the Last Year:    Transportation Needs:     Lack of Transportation (Medical):      Lack of Transportation (Non-Medical):    Physical Activity:     Days of Exercise per Week:     Minutes of Exercise per Session:    Stress:     Feeling of Stress :    Social Connections:     Frequency of Communication with Friends and Family:     Frequency of Social Gatherings with Friends and Family:     Attends [employer] Services:     Active Member of Clubs or Organizations:     Attends Club or Organization Meetings:     Marital Status:    Intimate Partner Violence:     Fear of Current or Ex-Partner:     Emotionally Abused:     Physically Abused:     Sexually Abused:       Medications and Allergies:     Current Outpatient Medications   Medication Sig Dispense Refill    Advair Diskus 250-50 MCG/DOSE inhaler INHALE 1 PUFF 2 (TWO) TIMES A DAY RINSE MOUTH AFTER USE   60 blister 1    albuterol (2 5 mg/3 mL) 0 083 % nebulizer solution Take 1 vial (2 5 mg total) by nebulization every 6 (six) hours as needed for wheezing or shortness of breath 360 mL 1    alendronate (Fosamax) 70 mg tablet Take 1 tablet (70 mg total) by mouth every 7 days 4 tablet 5    aspirin (ECOTRIN LOW STRENGTH) 81 mg EC tablet Take 1 tablet (81 mg total) by mouth daily 100 tablet 0    b complex vitamins capsule Take 1 capsule by mouth 2 (two) times a day        Calcium Carb-Cholecalciferol (CALCIUM 600 + D PO) Take 1 tablet by mouth 2 (two) times a day      celecoxib (CeleBREX) 100 mg capsule Take 1 capsule (100 mg total) by mouth 2 (two) times a day as needed for moderate pain 60 capsule 0    Fesoterodine Fumarate ER (Toviaz) 8 MG TB24 Take 8 mg by mouth daily       levothyroxine 50 mcg tablet TAKE 1 TABLET BY MOUTH EVERY DAY 90 tablet 1    loratadine (CLARITIN) 10 mg tablet TAKE 1 TABLET BY MOUTH EVERY DAY 90 tablet 1    metoprolol succinate (TOPROL-XL) 25 mg 24 hr tablet TAKE 1 TABLET BY MOUTH EVERY DAY 90 tablet 3    olmesartan (BENICAR) 5 mg tablet Take 1 tablet (5 mg total) by mouth daily 90 tablet 3    omeprazole (PriLOSEC) 40 MG capsule TAKE 1 CAPSULE BY MOUTH TWICE A  capsule 1    sertraline (ZOLOFT) 100 mg tablet TAKE 1 TABLET BY MOUTH EVERY DAY 90 tablet 0    simvastatin (ZOCOR) 40 mg tablet TAKE 1 TABLET BY MOUTH DAILY AT BEDTIME 90 tablet 1    albuterol (PROVENTIL HFA,VENTOLIN HFA) 90 mcg/act inhaler Inhale 2 puffs every 6 (six) hours as needed for wheezing (Patient not taking: Reported on 7/23/2021) 1 Inhaler 3    clotrimazole-betamethasone (LOTRISONE) 1-0 05 % cream Apply topically 2 (two) times a day 30 g 0    Cranberry 250 MG TABS Take by mouth      methylPREDNISolone 4 MG tablet therapy pack Use as directed on package 1 each 1    mometasone (ELOCON) 0 1 % cream APPLY TO THE RIGHT EAR CANAL TWICE DAILY FOR 2 WEEKS, THEN DECREASE TO ONCE AT BEDTIME OR AS NEEDED 45 g 0     No current facility-administered medications for this visit  Allergies   Allergen Reactions    Latex Rash    Neosporin [Neomycin-Bacitracin Zn-Polymyx] Rash and Other (See Comments)     hives per [facility] order      Immunizations:     Immunization History   Administered Date(s) Administered    Pneumococcal Conjugate 13-Valent 06/19/2018    Pneumococcal Polysaccharide PPV23 07/01/2019    SARS-CoV-2 / COVID-19 mRNA IM (Pfizer-BioNTech) 03/16/2021, 04/07/2021      Health Maintenance: There are no preventive care reminders to display for this patient  Topic Date Due    Influenza Vaccine (1) 09/01/2021      Medicare Health Risk Assessment:     /60   Pulse 72   Temp (!) 97 °F (36 1 °C)   Resp 18   Ht 4' 6" (1 372 m)   Wt 99 2 kg (218 lb 12 8 oz)   SpO2 (!) 89%   BMI 52 75 kg/m²      Aubree Gore is here for her Subsequent Wellness visit  Health Risk Assessment:   Patient rates overall health as good  Patient feels that their physical health rating is same  Patient is very satisfied with their life  Eyesight was rated as same  Hearing was rated as slightly worse  Patient feels that their emotional and mental health rating is same  Patients states they are never, rarely angry  Patient states they are sometimes unusually tired/fatigued  Pain experienced in the last 7 days has been some  Patient's pain rating has been 5/10  Patient states that she has experienced no weight loss or gain in last 6 months       Depression Screening:   PHQ-2 Score: 2  PHQ-9 Score: 5      Fall Risk Screening: In the past year, patient has experienced: no history of falling in past year      Urinary Incontinence Screening:   Patient has not leaked urine accidently in the last six months  Home Safety:  Patient has trouble with stairs inside or outside of their home  Patient has working smoke alarms and has no working carbon monoxide detector  Home safety hazards include: none  Medications:   Patient is currently taking over-the-counter supplements  OTC medications include: see medication list  Patient is able to manage medications  Activities of Daily Living (ADLs)/Instrumental Activities of Daily Living (IADLs):   Walk and transfer into and out of bed and chair?: Yes  Dress and groom yourself?: Yes    Bathe or shower yourself?: Yes    Feed yourself? Yes  Do your laundry/housekeeping?: Yes  Manage your money, pay your bills and track your expenses?: Yes  Make your own meals?: Yes    Do your own shopping?: Yes    Previous Hospitalizations:   Any hospitalizations or ED visits within the last 12 months?: No      Advance Care Planning:   Living will: Yes    Durable POA for healthcare:  Yes    Advanced directive: Yes    Advanced directive counseling given: Yes    Five wishes given: Yes    Patient declined ACP directive: No    End of Life Decisions reviewed with patient: Yes    Provider agrees with end of life decisions: Yes      Comments: Pt would not want resuscitation if no hope for recovery    Cognitive Screening:   Provider or family/friend/caregiver concerned regarding cognition?: No    PREVENTIVE SCREENINGS      Cardiovascular Screening:    General: Screening Not Indicated and History Lipid Disorder      Diabetes Screening:     General: Screening Current      Colorectal Cancer Screening:     General: Risks and Benefits Discussed    Due for: FOBT/FIT      Breast Cancer Screening:     General: Risks and Benefits Discussed and Patient Declines      Cervical Cancer Screening:    General: Screening Not Indicated      Abdominal Aortic Aneurysm (AAA) Screening:        General: Risks and Benefits Discussed and Screening Not Indicated      Lung Cancer Screening:     General: Screening Not Indicated      Hepatitis C Screening:    General: Risks and Benefits Discussed    Hep C Screening Accepted: No     Screening, Brief Intervention, and Referral to Treatment (SBIRT)    Screening  Typical number of drinks in a day: 0  Typical number of drinks in a week: 0  Interpretation: Low risk drinking behavior      Single Item Drug Screening:  How often have you used an illegal drug (including marijuana) or a prescription medication for non-medical reasons in the past year? never    Single Item Drug Screen Score: 0  Interpretation: Negative screen for possible drug use disorder      MABEL Castro

## 2021-08-16 ENCOUNTER — APPOINTMENT (OUTPATIENT)
Dept: LAB | Facility: HOSPITAL | Age: 81
End: 2021-08-16
Payer: MEDICARE

## 2021-08-16 DIAGNOSIS — Z12.11 SCREENING FOR COLORECTAL CANCER: ICD-10-CM

## 2021-08-16 DIAGNOSIS — N18.31 STAGE 3A CHRONIC KIDNEY DISEASE (HCC): ICD-10-CM

## 2021-08-16 DIAGNOSIS — Z12.12 SCREENING FOR COLORECTAL CANCER: ICD-10-CM

## 2021-08-16 LAB
ALBUMIN SERPL BCP-MCNC: 3.3 G/DL (ref 3.5–5)
ALP SERPL-CCNC: 77 U/L (ref 46–116)
ALT SERPL W P-5'-P-CCNC: 21 U/L (ref 12–78)
ANION GAP SERPL CALCULATED.3IONS-SCNC: 7 MMOL/L (ref 4–13)
AST SERPL W P-5'-P-CCNC: 21 U/L (ref 5–45)
BACTERIA UR QL AUTO: ABNORMAL /HPF
BILIRUB SERPL-MCNC: 0.29 MG/DL (ref 0.2–1)
BILIRUB UR QL STRIP: NEGATIVE
BUN SERPL-MCNC: 26 MG/DL (ref 5–25)
CALCIUM ALBUM COR SERPL-MCNC: 10 MG/DL (ref 8.3–10.1)
CALCIUM SERPL-MCNC: 9.4 MG/DL (ref 8.3–10.1)
CHLORIDE SERPL-SCNC: 103 MMOL/L (ref 100–108)
CLARITY UR: ABNORMAL
CO2 SERPL-SCNC: 27 MMOL/L (ref 21–32)
COLOR UR: YELLOW
CREAT SERPL-MCNC: 0.96 MG/DL (ref 0.6–1.3)
CREAT UR-MCNC: 25.3 MG/DL
GFR SERPL CREATININE-BSD FRML MDRD: 56 ML/MIN/1.73SQ M
GLUCOSE P FAST SERPL-MCNC: 87 MG/DL (ref 65–99)
GLUCOSE UR STRIP-MCNC: NEGATIVE MG/DL
HEMOCCULT STL QL IA: NEGATIVE
HGB UR QL STRIP.AUTO: ABNORMAL
KETONES UR STRIP-MCNC: NEGATIVE MG/DL
LEUKOCYTE ESTERASE UR QL STRIP: ABNORMAL
MICROALBUMIN UR-MCNC: 15.5 MG/L (ref 0–20)
MICROALBUMIN/CREAT 24H UR: 61 MG/G CREATININE (ref 0–30)
NITRITE UR QL STRIP: NEGATIVE
NON-SQ EPI CELLS URNS QL MICRO: ABNORMAL /HPF
PH UR STRIP.AUTO: 6 [PH]
POTASSIUM SERPL-SCNC: 4.8 MMOL/L (ref 3.5–5.3)
PROT SERPL-MCNC: 7.3 G/DL (ref 6.4–8.2)
PROT UR STRIP-MCNC: NEGATIVE MG/DL
RBC #/AREA URNS AUTO: ABNORMAL /HPF
SODIUM SERPL-SCNC: 137 MMOL/L (ref 136–145)
SP GR UR STRIP.AUTO: 1.01 (ref 1–1.03)
UROBILINOGEN UR QL STRIP.AUTO: 0.2 E.U./DL
WBC #/AREA URNS AUTO: ABNORMAL /HPF

## 2021-08-16 PROCEDURE — 82570 ASSAY OF URINE CREATININE: CPT | Performed by: FAMILY MEDICINE

## 2021-08-16 PROCEDURE — 80053 COMPREHEN METABOLIC PANEL: CPT

## 2021-08-16 PROCEDURE — 82043 UR ALBUMIN QUANTITATIVE: CPT | Performed by: FAMILY MEDICINE

## 2021-08-16 PROCEDURE — 81001 URINALYSIS AUTO W/SCOPE: CPT | Performed by: FAMILY MEDICINE

## 2021-08-16 PROCEDURE — 36415 COLL VENOUS BLD VENIPUNCTURE: CPT

## 2021-08-16 PROCEDURE — G0328 FECAL BLOOD SCRN IMMUNOASSAY: HCPCS

## 2021-08-17 ENCOUNTER — HOSPITAL ENCOUNTER (OUTPATIENT)
Dept: RADIOLOGY | Facility: CLINIC | Age: 81
Discharge: HOME | End: 2021-08-17
Attending: STUDENT IN AN ORGANIZED HEALTH CARE EDUCATION/TRAINING PROGRAM | Admitting: STUDENT IN AN ORGANIZED HEALTH CARE EDUCATION/TRAINING PROGRAM
Payer: MEDICARE

## 2021-08-17 VITALS
OXYGEN SATURATION: 92 % | TEMPERATURE: 97.1 F | SYSTOLIC BLOOD PRESSURE: 141 MMHG | RESPIRATION RATE: 20 BRPM | HEART RATE: 63 BPM | DIASTOLIC BLOOD PRESSURE: 66 MMHG

## 2021-08-17 DIAGNOSIS — M54.16 LUMBAR RADICULOPATHY: ICD-10-CM

## 2021-08-17 PROCEDURE — 62323 NJX INTERLAMINAR LMBR/SAC: CPT | Performed by: STUDENT IN AN ORGANIZED HEALTH CARE EDUCATION/TRAINING PROGRAM

## 2021-08-17 RX ORDER — METHYLPREDNISOLONE ACETATE 80 MG/ML
80 INJECTION, SUSPENSION INTRA-ARTICULAR; INTRALESIONAL; INTRAMUSCULAR; PARENTERAL; SOFT TISSUE ONCE
Status: COMPLETED | OUTPATIENT
Start: 2021-08-17 | End: 2021-08-17

## 2021-08-17 RX ADMIN — METHYLPREDNISOLONE ACETATE 80 MG: 80 INJECTION, SUSPENSION INTRA-ARTICULAR; INTRALESIONAL; INTRAMUSCULAR; PARENTERAL; SOFT TISSUE at 14:38

## 2021-08-17 RX ADMIN — IOHEXOL 1 ML: 300 INJECTION, SOLUTION INTRAVENOUS at 14:37

## 2021-08-17 NOTE — DISCHARGE INSTR - LAB
Epidural Steroid Injection   WHAT YOU NEED TO KNOW:   An epidural steroid injection (SYMONE) is a procedure to inject steroid medicine into the epidural space  The epidural space is between your spinal cord and vertebrae  Steroids reduce inflammation and fluid buildup in your spine that may be causing pain  You may be given pain medicine along with the steroids  ACTIVITY  · Do not drive or operate machinery today  · No strenuous activity today - bending, lifting, etc   · You may resume normal activites starting tomorrow - start slowly and as tolerated  · You may shower today, but no tub baths or hot tubs  · You may have numbness for several hours from the local anesthetic  Please use caution and common sense, especially with weight-bearing activities  CARE OF THE INJECTION SITE  · If you have soreness or pain, apply ice to the area today (20 minutes on/20 minutes off)  · Starting tomorrow, you may use warm, moist heat or ice if needed  · You may have an increase or change in your discomfort for 36-48 hours after your treatment  · Apply ice and continue with any pain medication you have been prescribed  · Notify [facility] if you have any of the following: redness, drainage, swelling, headache, stiff neck or fever above 100°F     SPECIAL INSTRUCTIONS  · Our office will contact you in approximately 7 days for a progress report  MEDICATIONS  · Continue to take all routine medications  · Our office may have instructed you to hold some medications  As no general anesthesia was used in today's procedure, you should not experience any side effects related to anesthesia  If you have a problem specifically related to your procedure, please call our office at [phone]  Problems not related to your procedure should be directed to your primary care physician

## 2021-08-24 ENCOUNTER — TELEPHONE (OUTPATIENT)
Dept: PAIN MEDICINE | Facility: CLINIC | Age: 81
End: 2021-08-24

## 2021-08-24 NOTE — TELEPHONE ENCOUNTER
Pt reports 50% improvement post inj   Pain level 2-3/10   Pt aware I will call next week for an update

## 2021-08-26 ENCOUNTER — TELEPHONE (OUTPATIENT)
Dept: CARDIOLOGY CLINIC | Facility: CLINIC | Age: 81
End: 2021-08-26

## 2021-08-26 ENCOUNTER — TELEPHONE (OUTPATIENT)
Dept: FAMILY MEDICINE CLINIC | Facility: CLINIC | Age: 81
End: 2021-08-26

## 2021-08-26 DIAGNOSIS — I10 ESSENTIAL HYPERTENSION: ICD-10-CM

## 2021-08-26 DIAGNOSIS — I50.32 CHRONIC DIASTOLIC (CONGESTIVE) HEART FAILURE (HCC): ICD-10-CM

## 2021-08-26 NOTE — TELEPHONE ENCOUNTER
Wilma Nelson from [place] called  Marisol's resting heart rate was 46 and after walking around her home the heart rate went up to 63   Patient sees Dr Kelli Logan    [phone]

## 2021-08-27 NOTE — TELEPHONE ENCOUNTER
Spoke with patient and she said samantha called cardiology for her to let them know  Which I do see documentation in her chart   She is just waiting for a call back but she feels okay per patient

## 2021-08-27 NOTE — TELEPHONE ENCOUNTER
If she is symptomatic with this I would cut her metoprolol in 1/2 for 12 5, but I would like her to let her cardiologist aware and see what he says

## 2021-08-27 NOTE — TELEPHONE ENCOUNTER
Informed patient to decrease Metoprolol from 25 mg daily to 12 5 mg daily  Keep record of heart rate and call us back on Monday with the readings from over the weekend  Patient understood instructions

## 2021-08-30 ENCOUNTER — TELEPHONE (OUTPATIENT)
Dept: CARDIOLOGY CLINIC | Facility: CLINIC | Age: 81
End: 2021-08-30

## 2021-08-30 RX ORDER — METOPROLOL SUCCINATE 25 MG/1
TABLET, EXTENDED RELEASE ORAL
Qty: 90 TABLET | Refills: 3
Start: 2021-08-30 | End: 2021-08-31 | Stop reason: ALTCHOICE

## 2021-08-30 NOTE — TELEPHONE ENCOUNTER
S/w pt  She states her BP on Sat was 151/66 before she took the Metoprolol 12 5mg  HR was running around 57-64  On Sunday her BP was 118/51, HR 51  Today her BP is 161/64 and HR is 50  She did not take her Metoprolol  Pt is concerned her HR is still running too low  Please advise

## 2021-08-30 NOTE — TELEPHONE ENCOUNTER
Pt called & stated that she was advised to call the office with her HR reading this morning  Pt's HR was reporting at 50 call transferred to [facility]

## 2021-08-31 DIAGNOSIS — M85.80 OSTEOPENIA, UNSPECIFIED LOCATION: ICD-10-CM

## 2021-08-31 RX ORDER — OLMESARTAN MEDOXOMIL 5 MG/1
10 TABLET ORAL DAILY
Qty: 180 TABLET | Refills: 3 | Status: SHIPPED | OUTPATIENT
Start: 2021-08-31 | End: 2022-03-25

## 2021-08-31 RX ORDER — ALENDRONATE SODIUM 70 MG/1
70 TABLET ORAL
Qty: 12 TABLET | Refills: 1 | Status: SHIPPED | OUTPATIENT
Start: 2021-08-31 | End: 2022-01-17

## 2021-09-10 ENCOUNTER — OFFICE VISIT (OUTPATIENT)
Dept: FAMILY MEDICINE CLINIC | Facility: CLINIC | Age: 81
End: 2021-09-10
Payer: MEDICARE

## 2021-09-10 ENCOUNTER — HOSPITAL ENCOUNTER (OUTPATIENT)
Dept: CT IMAGING | Facility: HOSPITAL | Age: 81
Discharge: HOME | End: 2021-09-10
Payer: MEDICARE

## 2021-09-10 VITALS
WEIGHT: 213 LBS | HEART RATE: 65 BPM | HEIGHT: 55 IN | SYSTOLIC BLOOD PRESSURE: 116 MMHG | DIASTOLIC BLOOD PRESSURE: 60 MMHG | OXYGEN SATURATION: 93 % | BODY MASS INDEX: 49.3 KG/M2 | TEMPERATURE: 99.3 F

## 2021-09-10 DIAGNOSIS — Z90.89 H/O MASTOIDECTOMY: ICD-10-CM

## 2021-09-10 DIAGNOSIS — R50.9 LOW GRADE FEVER: ICD-10-CM

## 2021-09-10 DIAGNOSIS — R51.9 INTRACTABLE HEADACHE, UNSPECIFIED CHRONICITY PATTERN, UNSPECIFIED HEADACHE TYPE: ICD-10-CM

## 2021-09-10 DIAGNOSIS — R51.9 INTRACTABLE HEADACHE, UNSPECIFIED CHRONICITY PATTERN, UNSPECIFIED HEADACHE TYPE: Primary | ICD-10-CM

## 2021-09-10 DIAGNOSIS — J30.0 VASOMOTOR RHINITIS: ICD-10-CM

## 2021-09-10 PROCEDURE — 99214 OFFICE O/P EST MOD 30 MIN: CPT | Performed by: FAMILY MEDICINE

## 2021-09-10 PROCEDURE — 87635 SARS-COV-2 COVID-19 AMP PRB: CPT | Performed by: FAMILY MEDICINE

## 2021-09-10 PROCEDURE — 70450 CT HEAD/BRAIN W/O DYE: CPT

## 2021-09-10 PROCEDURE — G1004 CDSM NDSC: HCPCS

## 2021-09-10 RX ORDER — TRAMADOL HYDROCHLORIDE 50 MG/1
50 TABLET ORAL EVERY 6 HOURS PRN
Qty: 10 TABLET | Refills: 0 | Status: SHIPPED | OUTPATIENT
Start: 2021-09-10 | End: 2022-03-14 | Stop reason: ALTCHOICE

## 2021-09-10 NOTE — PATIENT INSTRUCTIONS
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

## 2021-09-10 NOTE — PROGRESS NOTES
Assessment/Plan:     Chronic Problems:  No problem-specific Assessment & Plan notes found for this encounter  Visit Diagnosis:  Diagnoses and all orders for this visit:    Intractable headache, unspecified chronicity pattern, unspecified headache type  Comments: Will send for stat ct scan and call with results  If negative will prescribe fioricet  Orders:  -     Cancel: CT head wo contrast; Future  -     CT head wo contrast; Future    H/O mastoidectomy  Comments:  Keep the f/u appt with ent  Orders:  -     Cancel: CT head wo contrast; Future  -     CT head wo contrast; Future    Low grade fever  Comments: Will check for covid but suspect this will be negative  Orders:  -     Novel Coronavirus (Covid-19),PCR [facility] - Collected in Office    Vasomotor rhinitis  -     Novel Coronavirus (Covid-19),PCR [facility] - Collected in Office          Subjective:    Patient ID: Jaky Adams is a 79 y o  female  Pt is here with c/o pain in the head all week  Pain is in the front of the forehead  No nausea or vomiting  Rare cough  Checked her temp at home and it was 99 9  Pt has f/u with ent in November  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  Pt has taken tylenol for the headaches and also migraine meds but the pain has not changed for 5 days  Takes all other meds as directed  No side effects noted  The following portions of the patient's history were reviewed and updated as appropriate: allergies, current medications, past family history, past medical history, past social history, past surgical history and problem list     Review of Systems   Constitutional: Positive for fever (low grade)  Negative for chills, diaphoresis, fatigue and unexpected weight change  HENT: Positive for postnasal drip and sneezing  Negative for ear pain, sinus pressure, sinus pain and sore throat  Respiratory: Positive for cough (mild)  Negative for shortness of breath and wheezing      Cardiovascular: Negative for chest pain and palpitations  Gastrointestinal: Negative  Genitourinary: Negative  Neurological: Positive for light-headedness (at times) and headaches (for 5 days  )  Negative for dizziness  Psychiatric/Behavioral: Negative for dysphoric mood  The patient is not nervous/anxious  /60   Pulse 65   Temp 99 3 °F (37 4 °C)   Ht 4' 7" (1 397 m)   Wt 96 6 kg (213 lb)   SpO2 93%   BMI 49 51 kg/m²   Social History     Socioeconomic History    Marital status: Single     Spouse name: Not on file    Number of children: Not on file    Years of education: Not on file    Highest education level: Not on file   Occupational History    Not on file   Tobacco Use    Smoking status: Never Smoker    Smokeless tobacco: Never Used   Vaping Use    Vaping Use: Never used   Substance and Sexual Activity    Alcohol use: No    Drug use: No    Sexual activity: Never   Other Topics Concern    Not on file   Social History Narrative    Denied drinking coffee    Denied exercise habits    Most recent tobacco use screenin2018      Do you currently or have you served in the [employer]:   No      Were you activated, into active duty, as a member of the [employer] or as a Reservist:   No          Social Determinants of Health     Financial Resource Strain:     Difficulty of Paying Living Expenses:    Food Insecurity:     Worried About [facility] in the Last Year:    [address] in the Last Year:    Transportation Needs:     Lack of Transportation (Medical):      Lack of Transportation (Non-Medical):    Physical Activity:     Days of Exercise per Week:     Minutes of Exercise per Session:    Stress:     Feeling of Stress :    Social Connections:     Frequency of Communication with Friends and Family:     Frequency of Social Gatherings with Friends and Family:     Attends [employer] Services:     Active Member of Clubs or Organizations:     Attends Club or Organization Meetings:     Marital Status:    Intimate Partner Violence:     Fear of Current or Ex-Partner:     Emotionally Abused:     Physically Abused:     Sexually Abused:      Past Medical History:   Diagnosis Date    Anemia 08/22/2018    Anxiety     Arthritis     AVB (atrioventricular block)     first degree    Cataract     CHF (congestive heart failure) (HCC)     COPD, mild (HCC)     Coronary artery disease     Dislocation of right shoulder joint     Frequent UTI     GERD (gastroesophageal reflux disease)     H/O: pneumonia     Heme positive stool     Hyperlipidemia     Hypertension     Hypothyroidism     Morbid obesity with BMI of 50 0-59 9, adult ([facility] )     Obesity, morbid ([facility] ) 08/22/2018    JANICE on CPAP     [facility] 08/22/2018    Severe aortic stenosis     Simple goiter     Skin cyst     within the armpits, right    Wears glasses      Family History   Problem Relation Age of Onset    Diabetes Mother     Stroke Mother     Cancer Father     Lung cancer Father     Diabetes Sister     Heart disease Sister     Hypertension Sister     Coronary artery disease Family     Diabetes Family     Hypertension Family     Cancer Family     Stroke Family     Thyroid disease Neg Hx      Past Surgical History:   Procedure Laterality Date    BREAST BIOPSY      CARDIAC CATHETERIZATION      CARPAL TUNNEL RELEASE Bilateral     CHOLECYSTECTOMY      DILATION AND CURETTAGE OF UTERUS      HYSTEROSCOPY      MASTOID SURGERY      MA COLONOSCOPY FLX DX W/COLLJ SPEC WHEN PFRMD N/A 9/6/2018    Procedure: COLONOSCOPY;  Surgeon: Asuncion Santos MD;  [facility]; Service: Gastroenterology    MA ECHO TRANSESOPHAG R-T 2D W/PRB IMG ACQUISJ I&R N/A 10/9/2018    Procedure: INTRA-OP TRANSESOPHAGEAL ECHOCARDIOGRAM (GARRISON);   Surgeon: Jolly Roberto DO;  Location:  MAIN OR;  Service: Cardiac Surgery    MA ESOPHAGOGASTRODUODENOSCOPY TRANSORAL DIAGNOSTIC N/A 8/31/2018    Procedure: ESOPHAGOGASTRODUODENOSCOPY (EGD); Surgeon: Peter Alonzo MD;  [facility]; Service: Gastroenterology    LA REPLACE AORTIC VALVE OPENFEMORAL ARTERY APPROACH N/A 10/9/2018    Procedure: REPLACEMENT AORTIC VALVE TRANSCATHETER (TAVR) TRANSFEMORAL W/ 23 MM MENDOZA NOE S3 VALVE (ACCESS OF LEFT);   Surgeon: Nessa Linda DO;  [facility];  Service: Cardiac Surgery    TOTAL HIP ARTHROPLASTY Left 2007    TOTAL KNEE ARTHROPLASTY Bilateral        Current Outpatient Medications:     Advair Diskus 250-50 MCG/DOSE inhaler, INHALE 1 PUFF 2 (TWO) TIMES A DAY RINSE MOUTH AFTER USE , Disp: 60 blister, Rfl: 1    albuterol (2 5 mg/3 mL) 0 083 % nebulizer solution, Take 1 vial (2 5 mg total) by nebulization every 6 (six) hours as needed for wheezing or shortness of breath, Disp: 360 mL, Rfl: 1    albuterol (PROVENTIL HFA,VENTOLIN HFA) 90 mcg/act inhaler, Inhale 2 puffs every 6 (six) hours as needed for wheezing (Patient not taking: Reported on 7/23/2021), Disp: 1 Inhaler, Rfl: 3    alendronate (FOSAMAX) 70 mg tablet, TAKE 1 TABLET (70 MG TOTAL) BY MOUTH EVERY 7 DAYS, Disp: 12 tablet, Rfl: 1    aspirin (ECOTRIN LOW STRENGTH) 81 mg EC tablet, Take 1 tablet (81 mg total) by mouth daily, Disp: 100 tablet, Rfl: 0    b complex vitamins capsule, Take 1 capsule by mouth 2 (two) times a day  , Disp: , Rfl:     Calcium Carb-Cholecalciferol (CALCIUM 600 + D PO), Take 1 tablet by mouth 2 (two) times a day, Disp: , Rfl:     celecoxib (CeleBREX) 100 mg capsule, Take 1 capsule (100 mg total) by mouth 2 (two) times a day as needed for moderate pain, Disp: 60 capsule, Rfl: 0    clotrimazole-betamethasone (LOTRISONE) 1-0 05 % cream, Apply topically 2 (two) times a day, Disp: 30 g, Rfl: 0    Cranberry 250 MG TABS, Take by mouth, Disp: , Rfl:     Fesoterodine Fumarate ER (Toviaz) 8 MG TB24, Take 8 mg by mouth daily , Disp: , Rfl:     levothyroxine 50 mcg tablet, TAKE 1 TABLET BY MOUTH EVERY DAY, Disp: 90 tablet, Rfl: 1    loratadine (CLARITIN) 10 mg tablet, TAKE 1 TABLET BY MOUTH EVERY DAY, Disp: 90 tablet, Rfl: 1    methylPREDNISolone 4 MG tablet therapy pack, Use as directed on package, Disp: 1 each, Rfl: 1    mometasone (ELOCON) 0 1 % cream, APPLY TO THE RIGHT EAR CANAL TWICE DAILY FOR 2 WEEKS, THEN DECREASE TO ONCE AT BEDTIME OR AS NEEDED, Disp: 45 g, Rfl: 0    olmesartan (BENICAR) 5 mg tablet, Take 2 tablets (10 mg total) by mouth daily, Disp: 180 tablet, Rfl: 3    omeprazole (PriLOSEC) 40 MG capsule, TAKE 1 CAPSULE BY MOUTH TWICE A DAY, Disp: 180 capsule, Rfl: 1    sertraline (ZOLOFT) 100 mg tablet, TAKE 1 TABLET BY MOUTH EVERY DAY, Disp: 90 tablet, Rfl: 0    simvastatin (ZOCOR) 40 mg tablet, TAKE 1 TABLET BY MOUTH DAILY AT BEDTIME, Disp: 90 tablet, Rfl: 1    Allergies   Allergen Reactions    Latex Rash    Neosporin [Neomycin-Bacitracin Zn-Polymyx] Rash and Other (See Comments)     hives per [facility] order          Lab Review   Appointment on 08/16/2021   Component Date Value    OCCULT BLD, FECAL IMMUNO* 08/16/2021 Negative     Sodium 08/16/2021 137     Potassium 08/16/2021 4 8     Chloride 08/16/2021 103     CO2 08/16/2021 27     ANION GAP 08/16/2021 7     BUN 08/16/2021 26*    Creatinine 08/16/2021 0 96     Glucose, Fasting 08/16/2021 87     Calcium 08/16/2021 9 4     Corrected Calcium 08/16/2021 10 0     AST 08/16/2021 21     ALT 08/16/2021 21     Alkaline Phosphatase 08/16/2021 77     Total Protein 08/16/2021 7 3     Albumin 08/16/2021 3 3*    Total Bilirubin 08/16/2021 0 29     eGFR 08/16/2021 56    Office Visit on 08/10/2021   Component Date Value    Creatinine, Ur 08/16/2021 25 3     Microalbum  ,U,Random 08/16/2021 15 5     Microalb Creat Ratio 08/16/2021 61*    Clarity, UA 08/16/2021 Slightly Cloudy     Color, UA 08/16/2021 Yellow     Specific Gravity, UA 08/16/2021 1 010     pH, UA 08/16/2021 6 0     Glucose, UA 08/16/2021 Negative     Ketones, UA 08/16/2021 Negative     Blood, UA 08/16/2021 Trace-Intact*    Protein, UA 08/16/2021 Negative     Nitrite, UA 08/16/2021 Negative     Bilirubin, UA 08/16/2021 Negative     Urobilinogen, UA 08/16/2021 0 2     Leukocytes, UA 08/16/2021 Large*    WBC, UA 08/16/2021 Innumerable*    RBC, UA 08/16/2021 0-1*    Bacteria, UA 08/16/2021 Moderate*    Epithelial Cells 08/16/2021 Occasional         Imaging: FL spine and pain procedure    Result Date: 8/17/2021  Narrative: PROCEDURE NOTE PATIENT NAME:  Darrius Marino MEDICAL RECORD NUMBER:  [id number] YOB: 1940 DATE OF PROCEDURE:  08/17/21 PROCEDURE:  Lumbar interlaminar left parasagittal epidural steroid injection under fluoroscopic guidance at the L5-S1 level  ATTENDING PHYSICIAN: Yessica Gomez MD PRE-PROCEDURE DIAGNOSIS:  Lumbar radiculopathy POST-PROCEDURE DIAGNOSIS:  Diagnosis unchanged ANESTHESIA:  Local ESTIMATED BLOOD LOSS:  Minimal COMPLICATIONS:  None Initial attempt was made at the L3-4 interspace, but due to patient's degenerative disc disease, it was difficulty obtaining access  Therefore attention was turned to the L5-S1 level  FINDINGS:  None CONSENT:  Today's procedure, its potential benefits as well as its risks and potential side effects were reviewed  Discussed risks of the procedure include bleeding, infection, nerve irritation or damage, reactions medications administered, headache, failure of the pain to improve, and exacerbation of the pain  These risks were explained to the patient, who verbalized understanding and who wished to proceed  Informed consent was signed  DESCRIPTION OF THE PROCEDURE:  After written informed consent was obtained, the patient was taken to the fluoroscopy suite and placed in the prone position  Anatomical landmarks were identified by way of fluoroscopy in multiple views  The skin of the lumbar region was prepped using antiseptic applied solution and draped in the usual sterile fashion    Strict aseptic technique was utilized  The skin and subcutaneous tissues at the needle entry site were infiltrated with 2 mL of 1% preservative-free lidocaine using a 25-gauge 1-1/2-inch needle  A 20 gauge Tuohy needle was then incrementally advanced under fluoroscopy using a loss of resistance technique  Upon entering into the epidural space, a positive loss of resistance to air was noted and a characteristic pop was felt  Proper placement into the epidural space was confirmed with fluoroscopy in multiple views and injection of 1 mL Omnipaque 300 mg/ml  There were no paresthesias reported  After negative aspiration for CSF or heme, an injectate consisting of 1 ml of Depo-Medrol 80 mg/mL mixed with 4 mL of preservative-free normal saline was slowly administered  The patient tolerated the procedure well and all needles were removed intact  Hemostasis was maintained  There were no apparent paresthesias or complications  The skin was wiped clean and a Band-Aid was placed as appropriate  The patient was monitored for an appropriate period of time following the procedure and remained hemodynamically stable and neurovascularly intact  The patient was ultimately discharged to home with supervision in good condition and instructed that we would call 7 days to obtain an update  Objective:     Physical Exam  Vitals and nursing note reviewed  Constitutional:       General: She is not in acute distress  Appearance: She is well-developed  She is obese  She is not ill-appearing, toxic-appearing or diaphoretic  HENT:      Head: Normocephalic and atraumatic  Right Ear: Tympanic membrane, ear canal and external ear normal  There is no impacted cerumen  Ears:      Comments: Left tm not visible s/p mastoid surgery but area is healed well  Nose: Nose normal  No congestion  Mouth/Throat:      Mouth: Mucous membranes are moist       Pharynx: No oropharyngeal exudate     Eyes:      General: Right eye: No discharge  Left eye: No discharge  Extraocular Movements: Extraocular movements intact  Conjunctiva/sclera: Conjunctivae normal       Pupils: Pupils are equal, round, and reactive to light  Cardiovascular:      Rate and Rhythm: Normal rate and regular rhythm  Heart sounds: Murmur (Grade 1/6 systolic murmur  ) heard  Pulmonary:      Effort: Pulmonary effort is normal  No respiratory distress  Breath sounds: Normal breath sounds  Abdominal:      Comments: Abdomen is pendulous  Musculoskeletal:         General: No deformity  Cervical back: Normal range of motion and neck supple  No rigidity  Right lower leg: No edema  Left lower leg: No edema  Comments: Ambulates with walker slowly  Skin:     General: Skin is warm  Capillary Refill: Capillary refill takes less than 2 seconds  Coloration: Skin is not pale  Findings: No erythema  Neurological:      General: No focal deficit present  Mental Status: She is alert and oriented to person, place, and time  Comments: Hearing diminished on the left otherwise cranial nerves intact  Psychiatric:         Mood and Affect: Mood normal          Behavior: Behavior normal          Thought Content: Thought content normal          Judgment: Judgment normal            Patient Instructions   Reviewed all with patient  I am concerned about 5 days of a severe headache with no relief from Tylenol or Advil  Will get a CT scan of the brain now is specially in light of the fact that 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  Keep your follow-up appointment with ENT  Hopefully, if the CT scan is negative I will call in Fioricet which is a pain killer for the headache  Keep your follow-up appointment with all your specialist   I did check you for COVID I suspect this is not COVID but you do have a low-grade fever and runny nose    After you have your CT scan please stay home and quarantine until you here from a  I will call you over the weekend  Head on over to the hospital now as her CT scan is at 2:00 p m     Call here on Monday with an update  MABEL Castro    Portions of the record may have been created with voice recognition software  Occasional wrong word or "sound a like" substitutions may have occurred due to the inherent limitations of voice recognition software  Read the chart carefully and recognize, using context, where substitutions have occurred

## 2021-09-12 LAB — SARS-COV-2 RNA RESP QL NAA+PROBE: POSITIVE

## 2021-09-13 ENCOUNTER — TELEMEDICINE (OUTPATIENT)
Dept: FAMILY MEDICINE CLINIC | Facility: CLINIC | Age: 81
End: 2021-09-13
Payer: MEDICARE

## 2021-09-13 DIAGNOSIS — U07.1 COVID-19 VIRUS INFECTION: Primary | ICD-10-CM

## 2021-09-13 PROCEDURE — 99212 OFFICE O/P EST SF 10 MIN: CPT | Performed by: FAMILY MEDICINE

## 2021-09-13 NOTE — PATIENT INSTRUCTIONS
Reviewed all with patient  She feels she is making some headway with her infection  Advised to continue hydration take Tylenol for the aches and pains and if needed can have Mucinex for the cough  Patient was advised to call me tomorrow with an update if she feels  Her symptoms are worsening  For now stay home until symptoms have resolved for 48 hours

## 2021-09-13 NOTE — PROGRESS NOTES
COVID-19 Outpatient Progress Note    Assessment/Plan:    Problem List Items Addressed This Visit     None      Visit Diagnoses     COVID-19 virus infection    -  Primary    Pt is fully vaccinated and feeling better  Advised hydration, vitamin d3 2000 daily, zinc 50 mg, tylenol for aches, mucinex if needed  Call in am if worsening  Disposition:     I recommended continued isolation until at least 24 hours have passed since recovery defined as resolution of fever without the use of fever-reducing medications AND improvement in COVID symptoms AND 10 days have passed since onset of symptoms (or 10 days have passed since date of first positive viral diagnostic test for asymptomatic patients)  Pt appears to be improving  Advised tylenol as needed for aches and pains  Mucinex dm if needed for the cough and advised to hydrate and eat bland foods  Stay on vitamin d3 daily, zinc 50 mg weekly  Continue other meds  I have spent 10 minutes directly with the patient  Greater than 50% of this time was spent in counseling/coordination of care regarding: diagnostic results, prognosis, instructions for management and importance of treatment compliance          Verification of patient location:    Patient is located in the following state in which I hold an active license [place]    Encounter provider Carrillo Rojas Casia     Provider located at [facility] [address] [facility]  [address] [facility] [address]  [phone]    Recent Visits  Date Type Provider Dept   09/10/21 Office Visit Thuan Dos Santos-[address] recent visits within past 7 days and meeting all other requirements  Today's Visits  Date Type Provider Dept   09/13/21 Telemedicine MABEL Dos Santos  [facility] [address] [place] [address]   Showing today's visits and meeting all other requirements  Future Appointments  No visits were found meeting these conditions  Showing future appointments within next 150 days and meeting all other requirements     This virtual check-in was done via [employer] and patient was informed that this is a secure, HIPAA-compliant platform  She agrees to proceed  Patient agrees to participate in a virtual check in via telephone or video visit instead of presenting to the office to address urgent/immediate medical needs  Patient is aware this is a billable service  After connecting through [facility], the patient was identified by name and date of birth  Nikki Leonard was informed that this was a telemedicine visit and that the exam was being conducted confidentially over secure lines  My office door was closed  No one else was in the room  Nikki Leonard acknowledged consent and understanding of privacy and security of the telemedicine visit  I informed the patient that I have reviewed her record in Epic and presented the opportunity for her to ask any questions regarding the visit today  The patient agreed to participate  Subjective:   Nikki Leonard is a 79 y o  female who has been screened for COVID-19  Symptom change since last report: resolving  Patient's symptoms include cough (rare), nausea and myalgias  Patient denies fever, chills, fatigue, malaise, congestion, rhinorrhea, sore throat, anosmia, loss of taste, shortness of breath, chest tightness, abdominal pain, vomiting, diarrhea and headaches (now resolved  )  Date of positive COVID-19 PCR: 9/10/2021  COVID-19 vaccination status: Fully vaccinated    Jasmyn Garcia has been staying home and has isolated themselves in her home  She is taking care to not share personal items and is cleaning all surfaces that are touched often, like counters, tabletops, and doorknobs using household cleaning sprays or wipes  Wearing a mask when leaving room?: is not      Video f/u was attempted and could not be completed   Phone call was placed to pt who states she is feeling better with only a mild cough and the headache has resolved  Does feel like she has muscle aches  Lab Results   Component Value Date    SARSCOV2 Positive (A) 09/10/2021     Past Medical History:   Diagnosis Date    Anemia 08/22/2018    Anxiety     Arthritis     AVB (atrioventricular block)     first degree    Cataract     CHF (congestive heart failure) (HCC)     COPD, mild (HCC)     Coronary artery disease     Dislocation of right shoulder joint     Frequent UTI     GERD (gastroesophageal reflux disease)     H/O: pneumonia     Heme positive stool     Hyperlipidemia     Hypertension     Hypothyroidism     Morbid obesity with BMI of 50 0-59 9, adult ([facility] [address] )     Obesity, morbid ([facility] [address] ) 08/22/2018    JANICE on CPAP     [facility] 08/22/2018    Severe aortic stenosis     Simple goiter     Skin cyst     within the armpits, right    Wears glasses      Past Surgical History:   Procedure Laterality Date    BREAST BIOPSY      CARDIAC CATHETERIZATION      CARPAL TUNNEL RELEASE Bilateral     CHOLECYSTECTOMY      DILATION AND CURETTAGE OF UTERUS      HYSTEROSCOPY      MASTOID SURGERY      CO COLONOSCOPY FLX DX W/COLLJ SPEC WHEN PFRMD N/A 9/6/2018    Procedure: COLONOSCOPY;  Surgeon: Eleanor Abad MD;  [facility]; Service: Gastroenterology    CO ECHO TRANSESOPHAG R-T 2D W/PRB IMG ACQUISJ I&R N/A 10/9/2018    Procedure: INTRA-OP TRANSESOPHAGEAL ECHOCARDIOGRAM (GARRISON); Surgeon: Kandace Prader, DO;  [facility];  Service: Cardiac Surgery    CO ESOPHAGOGASTRODUODENOSCOPY TRANSORAL DIAGNOSTIC N/A 8/31/2018    Procedure: ESOPHAGOGASTRODUODENOSCOPY (EGD); Surgeon: Eleanor Abad MD;  [facility]; Service: Gastroenterology    CO REPLACE AORTIC VALVE OPENFEMORAL ARTERY APPROACH N/A 10/9/2018    Procedure: REPLACEMENT AORTIC VALVE TRANSCATHETER (TAVR) TRANSFEMORAL W/ 23 MM MENDOZA NOE S3 VALVE (ACCESS OF LEFT);   Surgeon: Bart Alvarez DO Migue;  [facility];  Service: Cardiac Surgery    TOTAL HIP ARTHROPLASTY Left 2007    TOTAL KNEE ARTHROPLASTY Bilateral      Current Outpatient Medications   Medication Sig Dispense Refill    Advair Diskus 250-50 MCG/DOSE inhaler INHALE 1 PUFF 2 (TWO) TIMES A DAY RINSE MOUTH AFTER USE   60 blister 1    albuterol (2 5 mg/3 mL) 0 083 % nebulizer solution Take 1 vial (2 5 mg total) by nebulization every 6 (six) hours as needed for wheezing or shortness of breath 360 mL 1    albuterol (PROVENTIL HFA,VENTOLIN HFA) 90 mcg/act inhaler Inhale 2 puffs every 6 (six) hours as needed for wheezing (Patient not taking: Reported on 7/23/2021) 1 Inhaler 3    alendronate (FOSAMAX) 70 mg tablet TAKE 1 TABLET (70 MG TOTAL) BY MOUTH EVERY 7 DAYS 12 tablet 1    aspirin (ECOTRIN LOW STRENGTH) 81 mg EC tablet Take 1 tablet (81 mg total) by mouth daily 100 tablet 0    b complex vitamins capsule Take 1 capsule by mouth 2 (two) times a day        Calcium Carb-Cholecalciferol (CALCIUM 600 + D PO) Take 1 tablet by mouth 2 (two) times a day      celecoxib (CeleBREX) 100 mg capsule Take 1 capsule (100 mg total) by mouth 2 (two) times a day as needed for moderate pain 60 capsule 0    clotrimazole-betamethasone (LOTRISONE) 1-0 05 % cream Apply topically 2 (two) times a day 30 g 0    Cranberry 250 MG TABS Take by mouth      Fesoterodine Fumarate ER (Toviaz) 8 MG TB24 Take 8 mg by mouth daily       levothyroxine 50 mcg tablet TAKE 1 TABLET BY MOUTH EVERY DAY 90 tablet 1    loratadine (CLARITIN) 10 mg tablet TAKE 1 TABLET BY MOUTH EVERY DAY 90 tablet 1    methylPREDNISolone 4 MG tablet therapy pack Use as directed on package 1 each 1    mometasone (ELOCON) 0 1 % cream APPLY TO THE RIGHT EAR CANAL TWICE DAILY FOR 2 WEEKS, THEN DECREASE TO ONCE AT BEDTIME OR AS NEEDED 45 g 0    olmesartan (BENICAR) 5 mg tablet Take 2 tablets (10 mg total) by mouth daily 180 tablet 3    omeprazole (PriLOSEC) 40 MG capsule TAKE 1 CAPSULE BY MOUTH TWICE A  capsule 1    sertraline (ZOLOFT) 100 mg tablet TAKE 1 TABLET BY MOUTH EVERY DAY 90 tablet 0    simvastatin (ZOCOR) 40 mg tablet TAKE 1 TABLET BY MOUTH DAILY AT BEDTIME 90 tablet 1    traMADol (ULTRAM) 50 mg tablet Take 1 tablet (50 mg total) by mouth every 6 (six) hours as needed for moderate pain 10 tablet 0     No current facility-administered medications for this visit  Allergies   Allergen Reactions    Latex Rash    Neosporin [Neomycin-Bacitracin Zn-Polymyx] Rash and Other (See Comments)     hives per [facility] order       Review of Systems   Constitutional: Negative for chills, fatigue and fever  HENT: Negative for congestion, rhinorrhea and sore throat  Respiratory: Positive for cough (rare)  Negative for chest tightness and shortness of breath  Gastrointestinal: Positive for nausea  Negative for abdominal pain, diarrhea and vomiting  Musculoskeletal: Positive for myalgias  Neurological: Negative for headaches (now resolved  )  Objective: There were no vitals filed for this visit  Physical Exam  Pulmonary:      Effort: Pulmonary effort is normal       Comments: No cough during this interview  Neurological:      Mental Status: She is oriented to person, place, and time  Psychiatric:         Mood and Affect: Mood normal          Thought Content: Thought content normal          Judgment: Judgment normal          VIRTUAL VISIT [address]  Augustine Sanabria verbally agrees to participate in [facility]  Pt is aware that [facility] could be limited without vital signs or the ability to perform a full hands-on physical Justin Confer understands she or the provider may request at any time to terminate the video visit and request the patient to seek care or treatment in person

## 2021-09-14 ENCOUNTER — TELEPHONE (OUTPATIENT)
Dept: PAIN MEDICINE | Facility: CLINIC | Age: 81
End: 2021-09-14

## 2021-09-14 NOTE — TELEPHONE ENCOUNTER
Name of Caller: Patient    Problem/Symptoms: Low HR 53    Call back number: [phone]    Patient concerned regarding HR

## 2021-09-14 NOTE — TELEPHONE ENCOUNTER
[facility] at home physical therapy Luigi Duran called to make physician aware patient has Covid and she will put her in Isolation from 9/10 to 9/23    Thank you

## 2021-09-15 NOTE — TELEPHONE ENCOUNTER
Spoke with the pt today she stated her hr was 73 she felt fine but that she was just feeling tired from covid  Pt denies sob ,chest pain   Pt asked to call back if she has any progressing symptoms

## 2021-09-16 ENCOUNTER — TELEPHONE (OUTPATIENT)
Dept: OTHER | Facility: OTHER | Age: 81
End: 2021-09-16

## 2021-09-17 NOTE — TELEPHONE ENCOUNTER
Pt called back in because she is concerned and wants to know if her diarrhea is a part of her Covid Symptoms and what should she do  Her diarrhea has been on and off

## 2021-09-17 NOTE — TELEPHONE ENCOUNTER
Called patient and she I advised her to try ,metamucil 1-2 tablespoons in a 6 to 8 oz glass of water and to hydrate   If not better to call us and let us know

## 2021-09-27 DIAGNOSIS — J96.11 CHRONIC HYPOXEMIC RESPIRATORY FAILURE (HCC): ICD-10-CM

## 2021-09-27 DIAGNOSIS — J44.9 CHRONIC OBSTRUCTIVE PULMONARY DISEASE, UNSPECIFIED COPD TYPE (HCC): ICD-10-CM

## 2021-09-28 DIAGNOSIS — I35.0 AORTIC STENOSIS, SEVERE: ICD-10-CM

## 2021-09-28 DIAGNOSIS — Z95.2 S/P TAVR (TRANSCATHETER AORTIC VALVE REPLACEMENT): ICD-10-CM

## 2021-09-28 DIAGNOSIS — J30.89 ENVIRONMENTAL AND SEASONAL ALLERGIES: ICD-10-CM

## 2021-09-28 DIAGNOSIS — E03.9 ACQUIRED HYPOTHYROIDISM: ICD-10-CM

## 2021-09-28 RX ORDER — LEVOTHYROXINE SODIUM 0.05 MG/1
TABLET ORAL
Qty: 90 TABLET | Refills: 1 | Status: SHIPPED | OUTPATIENT
Start: 2021-09-28 | End: 2022-01-17

## 2021-09-28 RX ORDER — SIMVASTATIN 40 MG
TABLET ORAL
Qty: 90 TABLET | Refills: 1 | Status: SHIPPED | OUTPATIENT
Start: 2021-09-28 | End: 2022-01-17

## 2021-09-28 RX ORDER — LORATADINE 10 MG/1
TABLET ORAL
Qty: 90 TABLET | Refills: 1 | Status: SHIPPED | OUTPATIENT
Start: 2021-09-28 | End: 2022-02-28

## 2021-09-30 ENCOUNTER — TELEPHONE (OUTPATIENT)
Dept: PULMONOLOGY | Facility: CLINIC | Age: 81
End: 2021-09-30

## 2021-09-30 NOTE — TELEPHONE ENCOUNTER
Patient is calling in today asking for advise on whether or not she should be using her cpap due to recall  Can you please give her a call when you have a moment to discuss? Thank you

## 2021-09-30 NOTE — TELEPHONE ENCOUNTER
Spoke with patient  Discussed risks of continuing with the CPAP vs risks of untreated sleep apnea, leaving it up to her to decide whether to use or not  If she does use the CPAP, told her to look for any new headaches, sinus issues, any reside within the CPAP

## 2021-10-15 ENCOUNTER — OFFICE VISIT (OUTPATIENT)
Dept: CARDIOLOGY CLINIC | Facility: CLINIC | Age: 81
End: 2021-10-15
Payer: MEDICARE

## 2021-10-15 VITALS
HEIGHT: 55 IN | HEART RATE: 78 BPM | WEIGHT: 208.8 LBS | DIASTOLIC BLOOD PRESSURE: 70 MMHG | SYSTOLIC BLOOD PRESSURE: 126 MMHG | OXYGEN SATURATION: 92 % | BODY MASS INDEX: 48.32 KG/M2

## 2021-10-15 DIAGNOSIS — I50.32 CHRONIC DIASTOLIC (CONGESTIVE) HEART FAILURE (HCC): ICD-10-CM

## 2021-10-15 DIAGNOSIS — I05.9 MITRAL VALVE DISORDER: ICD-10-CM

## 2021-10-15 DIAGNOSIS — E78.2 MIXED HYPERLIPIDEMIA: ICD-10-CM

## 2021-10-15 DIAGNOSIS — I10 PRIMARY HYPERTENSION: ICD-10-CM

## 2021-10-15 DIAGNOSIS — I35.0 AORTIC VALVE STENOSIS, ETIOLOGY OF CARDIAC VALVE DISEASE UNSPECIFIED: Primary | ICD-10-CM

## 2021-10-15 PROCEDURE — 99214 OFFICE O/P EST MOD 30 MIN: CPT | Performed by: PHYSICIAN ASSISTANT

## 2021-10-15 RX ORDER — METOPROLOL SUCCINATE 25 MG/1
25 TABLET, EXTENDED RELEASE ORAL DAILY
COMMUNITY
End: 2021-12-14 | Stop reason: ALTCHOICE

## 2021-10-24 DIAGNOSIS — F41.8 DEPRESSION WITH ANXIETY: ICD-10-CM

## 2021-10-24 RX ORDER — SERTRALINE HYDROCHLORIDE 100 MG/1
TABLET, FILM COATED ORAL
Qty: 90 TABLET | Refills: 0 | Status: SHIPPED | OUTPATIENT
Start: 2021-10-24 | End: 2022-01-17

## 2021-11-15 ENCOUNTER — TELEPHONE (OUTPATIENT)
Dept: CARDIOLOGY CLINIC | Facility: CLINIC | Age: 81
End: 2021-11-15

## 2021-11-16 ENCOUNTER — HOSPITAL ENCOUNTER (OUTPATIENT)
Dept: NON INVASIVE DIAGNOSTICS | Facility: CLINIC | Age: 81
Discharge: HOME | End: 2021-11-16
Payer: MEDICARE

## 2021-11-16 VITALS
HEART RATE: 70 BPM | HEIGHT: 55 IN | BODY MASS INDEX: 49.53 KG/M2 | WEIGHT: 214 LBS | DIASTOLIC BLOOD PRESSURE: 70 MMHG | SYSTOLIC BLOOD PRESSURE: 124 MMHG

## 2021-11-16 DIAGNOSIS — I35.0 AORTIC VALVE STENOSIS, ETIOLOGY OF CARDIAC VALVE DISEASE UNSPECIFIED: ICD-10-CM

## 2021-11-16 LAB
AORTIC ROOT: 3.3 CM
AORTIC VALVE MEAN VELOCITY: 18.8 M/S
APICAL FOUR CHAMBER EJECTION FRACTION: 65 %
AV AREA BY CONTINUOUS VTI: 1.4 CM2
AV AREA PEAK VELOCITY: 1.3 CM2
AV LVOT MEAN GRADIENT: 3 MMHG
AV LVOT PEAK GRADIENT: 4 MMHG
AV MEAN GRADIENT: 16 MMHG
AV PEAK GRADIENT: 28 MMHG
AV VALVE AREA: 1.35 CM2
AV VELOCITY RATIO: 0.4
DOP CALC AO PEAK VEL: 2.6 M/S
DOP CALC AO VTI: 61.95 CM
DOP CALC LVOT AREA: 3.14 CM2
DOP CALC LVOT DIAMETER: 2 CM
DOP CALC LVOT PEAK VEL VTI: 26.72 CM
DOP CALC LVOT PEAK VEL: 1.05 M/S
DOP CALC LVOT STROKE INDEX: 44.4 ML/M2
DOP CALC LVOT STROKE VOLUME: 83.9 CM3
DOP CALC MV VTI: 65.27 CM
E WAVE DECELERATION TIME: 408 MS
FRACTIONAL SHORTENING: 29 % (ref 28–44)
INTERVENTRICULAR SEPTUM IN DIASTOLE (PARASTERNAL SHORT AXIS VIEW): 1.3 CM
LEFT ATRIUM AREA SYSTOLE SINGLE PLANE A4C: 15.7 CM2
LEFT INTERNAL DIMENSION IN SYSTOLE: 2.4 CM (ref 2.1–4)
LEFT VENTRICULAR INTERNAL DIMENSION IN DIASTOLE: 3.4 CM (ref 4.83–7.19)
LEFT VENTRICULAR POSTERIOR WALL IN END DIASTOLE: 1.3 CM
LEFT VENTRICULAR STROKE VOLUME: 28 ML
MV E'TISSUE VEL-SEP: 5 CM/S
MV MEAN GRADIENT: 6 MMHG
MV PEAK A VEL: 1.69 M/S
MV PEAK E VEL: 127 CM/S
MV PEAK GRADIENT: 18 MMHG
MV STENOSIS PRESSURE HALF TIME: 0 MS
MV VALVE AREA BY CONTINUITY EQUATION: 1.29 CM2
RIGHT ATRIUM AREA SYSTOLE A4C: 18.7 CM2
RIGHT VENTRICLE ID DIMENSION: 4.1 CM
SL CV PED ECHO LEFT VENTRICLE DIASTOLIC VOLUME (MOD BIPLANE) 2D: 47 ML
SL CV PED ECHO LEFT VENTRICLE SYSTOLIC VOLUME (MOD BIPLANE) 2D: 19 ML
TRICUSPID VALVE PEAK REGURGITATION VELOCITY: 3.18 M/S
TRICUSPID VALVE S': 1.1 CM/S
TV PEAK GRADIENT: 40 MMHG
Z-SCORE OF LEFT VENTRICULAR DIMENSION IN END SYSTOLE: -5.49

## 2021-11-16 PROCEDURE — 93306 TTE W/DOPPLER COMPLETE: CPT | Performed by: INTERNAL MEDICINE

## 2021-11-16 PROCEDURE — 93306 TTE W/DOPPLER COMPLETE: CPT

## 2021-12-07 ENCOUNTER — RA CDI HCC (OUTPATIENT)
Dept: OTHER | Facility: HOSPITAL | Age: 81
End: 2021-12-07

## 2021-12-14 ENCOUNTER — OFFICE VISIT (OUTPATIENT)
Dept: FAMILY MEDICINE CLINIC | Facility: CLINIC | Age: 81
End: 2021-12-14
Payer: MEDICARE

## 2021-12-14 VITALS
DIASTOLIC BLOOD PRESSURE: 68 MMHG | SYSTOLIC BLOOD PRESSURE: 122 MMHG | WEIGHT: 215.4 LBS | BODY MASS INDEX: 49.85 KG/M2 | HEART RATE: 70 BPM | OXYGEN SATURATION: 93 % | TEMPERATURE: 96.9 F | RESPIRATION RATE: 18 BRPM | HEIGHT: 55 IN

## 2021-12-14 DIAGNOSIS — E66.01 OBESITY, MORBID (HCC): Chronic | ICD-10-CM

## 2021-12-14 DIAGNOSIS — I10 PRIMARY HYPERTENSION: Primary | Chronic | ICD-10-CM

## 2021-12-14 DIAGNOSIS — I27.20 PULMONARY HYPERTENSION (HCC): Chronic | ICD-10-CM

## 2021-12-14 PROCEDURE — 99214 OFFICE O/P EST MOD 30 MIN: CPT | Performed by: FAMILY MEDICINE

## 2021-12-15 DIAGNOSIS — K21.00 GASTROESOPHAGEAL REFLUX DISEASE WITH ESOPHAGITIS: ICD-10-CM

## 2021-12-15 RX ORDER — OMEPRAZOLE 40 MG/1
CAPSULE, DELAYED RELEASE ORAL
Qty: 180 CAPSULE | Refills: 1 | Status: SHIPPED | OUTPATIENT
Start: 2021-12-15 | End: 2022-06-16

## 2022-01-17 DIAGNOSIS — I35.0 AORTIC STENOSIS, SEVERE: ICD-10-CM

## 2022-01-17 DIAGNOSIS — M85.80 OSTEOPENIA, UNSPECIFIED LOCATION: ICD-10-CM

## 2022-01-17 DIAGNOSIS — Z95.2 S/P TAVR (TRANSCATHETER AORTIC VALVE REPLACEMENT): ICD-10-CM

## 2022-01-17 DIAGNOSIS — E03.9 ACQUIRED HYPOTHYROIDISM: ICD-10-CM

## 2022-01-17 DIAGNOSIS — F41.8 DEPRESSION WITH ANXIETY: ICD-10-CM

## 2022-01-17 RX ORDER — SERTRALINE HYDROCHLORIDE 100 MG/1
TABLET, FILM COATED ORAL
Qty: 90 TABLET | Refills: 0 | Status: SHIPPED | OUTPATIENT
Start: 2022-01-17 | End: 2022-02-28

## 2022-01-17 RX ORDER — LEVOTHYROXINE SODIUM 0.05 MG/1
TABLET ORAL
Qty: 90 TABLET | Refills: 1 | Status: SHIPPED | OUTPATIENT
Start: 2022-01-17 | End: 2022-07-25 | Stop reason: SDUPTHER

## 2022-01-17 RX ORDER — ALENDRONATE SODIUM 70 MG/1
70 TABLET ORAL
Qty: 12 TABLET | Refills: 1 | Status: SHIPPED | OUTPATIENT
Start: 2022-01-17 | End: 2022-03-15 | Stop reason: SDUPTHER

## 2022-01-17 RX ORDER — SIMVASTATIN 40 MG
TABLET ORAL
Qty: 90 TABLET | Refills: 1 | Status: SHIPPED | OUTPATIENT
Start: 2022-01-17 | End: 2022-07-25

## 2022-02-28 DIAGNOSIS — J30.89 ENVIRONMENTAL AND SEASONAL ALLERGIES: ICD-10-CM

## 2022-02-28 DIAGNOSIS — F41.8 DEPRESSION WITH ANXIETY: ICD-10-CM

## 2022-02-28 RX ORDER — LORATADINE 10 MG/1
TABLET ORAL
Qty: 90 TABLET | Refills: 1 | Status: SHIPPED | OUTPATIENT
Start: 2022-02-28 | End: 2022-05-02 | Stop reason: ALTCHOICE

## 2022-02-28 RX ORDER — SERTRALINE HYDROCHLORIDE 100 MG/1
TABLET, FILM COATED ORAL
Qty: 90 TABLET | Refills: 0 | Status: SHIPPED | OUTPATIENT
Start: 2022-02-28 | End: 2022-07-10

## 2022-03-14 ENCOUNTER — OFFICE VISIT (OUTPATIENT)
Dept: FAMILY MEDICINE CLINIC | Facility: CLINIC | Age: 82
End: 2022-03-14
Payer: MEDICARE

## 2022-03-14 VITALS
DIASTOLIC BLOOD PRESSURE: 60 MMHG | BODY MASS INDEX: 49.99 KG/M2 | SYSTOLIC BLOOD PRESSURE: 130 MMHG | HEIGHT: 55 IN | WEIGHT: 216 LBS | HEART RATE: 68 BPM | TEMPERATURE: 97.4 F | OXYGEN SATURATION: 93 %

## 2022-03-14 DIAGNOSIS — K21.9 GASTROESOPHAGEAL REFLUX DISEASE WITHOUT ESOPHAGITIS: Chronic | ICD-10-CM

## 2022-03-14 DIAGNOSIS — I10 PRIMARY HYPERTENSION: Chronic | ICD-10-CM

## 2022-03-14 DIAGNOSIS — Z95.2 S/P TAVR (TRANSCATHETER AORTIC VALVE REPLACEMENT): ICD-10-CM

## 2022-03-14 DIAGNOSIS — D44.7 GLOMUS TYMPANICUM TUMOR (HCC): ICD-10-CM

## 2022-03-14 DIAGNOSIS — F51.01 PRIMARY INSOMNIA: ICD-10-CM

## 2022-03-14 DIAGNOSIS — E66.01 OBESITY, MORBID (HCC): ICD-10-CM

## 2022-03-14 DIAGNOSIS — N18.31 STAGE 3A CHRONIC KIDNEY DISEASE (HCC): ICD-10-CM

## 2022-03-14 DIAGNOSIS — I27.20 PULMONARY HYPERTENSION (HCC): ICD-10-CM

## 2022-03-14 DIAGNOSIS — J96.11 CHRONIC HYPOXEMIC RESPIRATORY FAILURE (HCC): ICD-10-CM

## 2022-03-14 DIAGNOSIS — E03.9 HYPOTHYROIDISM, UNSPECIFIED TYPE: Chronic | ICD-10-CM

## 2022-03-14 DIAGNOSIS — Z00.00 HEALTHCARE MAINTENANCE: Primary | ICD-10-CM

## 2022-03-14 DIAGNOSIS — J44.9 CHRONIC OBSTRUCTIVE PULMONARY DISEASE, UNSPECIFIED COPD TYPE (HCC): ICD-10-CM

## 2022-03-14 DIAGNOSIS — F41.8 DEPRESSION WITH ANXIETY: ICD-10-CM

## 2022-03-14 DIAGNOSIS — F33.9 DEPRESSION, RECURRENT (HCC): ICD-10-CM

## 2022-03-14 PROBLEM — D62 POSTOPERATIVE ANEMIA DUE TO ACUTE BLOOD LOSS: Status: RESOLVED | Noted: 2018-10-11 | Resolved: 2022-03-14

## 2022-03-14 PROBLEM — R06.02 SOB (SHORTNESS OF BREATH): Status: RESOLVED | Noted: 2020-06-16 | Resolved: 2022-03-14

## 2022-03-14 PROBLEM — Z48.89 ENCOUNTER FOR POSTOPERATIVE CARE: Status: RESOLVED | Noted: 2018-11-02 | Resolved: 2022-03-14

## 2022-03-14 PROBLEM — E66.813 CLASS 3 SEVERE OBESITY WITH SERIOUS COMORBIDITY AND BODY MASS INDEX (BMI) OF 50.0 TO 59.9 IN ADULT (HCC): Status: RESOLVED | Noted: 2020-06-16 | Resolved: 2022-03-14

## 2022-03-14 PROBLEM — F41.9 ANXIETY: Status: RESOLVED | Noted: 2020-06-16 | Resolved: 2022-03-14

## 2022-03-14 PROBLEM — D18.09 GLOMUS TYMPANICUM TUMOR: Status: ACTIVE | Noted: 2022-03-14

## 2022-03-14 PROBLEM — R53.83 FATIGUE: Status: RESOLVED | Noted: 2020-06-30 | Resolved: 2022-03-14

## 2022-03-14 PROBLEM — J98.4 ACUTE PULMONARY INSUFFICIENCY: Status: RESOLVED | Noted: 2018-10-10 | Resolved: 2022-03-14

## 2022-03-14 PROCEDURE — 99214 OFFICE O/P EST MOD 30 MIN: CPT | Performed by: NURSE PRACTITIONER

## 2022-03-14 RX ORDER — TRAZODONE HYDROCHLORIDE 50 MG/1
50 TABLET ORAL
Qty: 30 TABLET | Refills: 0 | Status: SHIPPED | OUTPATIENT
Start: 2022-03-14 | End: 2022-04-08

## 2022-03-14 NOTE — PROGRESS NOTES
Assessment/Plan:     Chronic Problems:  Gastroesophageal reflux disease without esophagitis  Continue omeprazole daily  Hypothyroid  Will obtain thyroid testing prior to next appointment  Continue levothyroxine 50 mcg  Hypertension  Blood pressure is at goal   Continue current medications  Obesity, morbid ([place] [address] )  Advised weight loss with diet and exercise  S/P TAVR (transcatheter aortic valve replacement)  Patient continues care with cardiology  Depression with anxiety  Continue sertraline daily  Insomnia  Will start on trazodone at bedtime  Visit Diagnosis:  Diagnoses and all orders for this visit:    Healthcare maintenance  -     CBC and differential; Future  -     Comprehensive metabolic panel; Future  -     Lipid panel; Future  -     TSH, 3rd generation with Free T4 reflex; Future    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[address] )    Stage 3a chronic kidney disease (HCC)    Chronic obstructive pulmonary disease, unspecified COPD type ([place] [address] )    Pulmonary hypertension (HCC)    Depression, recurrent ([place] [address] )    Chronic hypoxemic respiratory failure (HCC)    Obesity, morbid ([place] [address] )    Primary insomnia  -     traZODone (DESYREL) 50 mg tablet; Take 1 tablet (50 mg total) by mouth daily at bedtime    Gastroesophageal reflux disease without esophagitis    Hypothyroidism, unspecified type    Primary hypertension    S/P TAVR (transcatheter aortic valve replacement)    Depression with anxiety          Subjective:    Patient ID: Farhad Mullen is a 79 y o  female  Patient presents for routine follow-up  Patient is concerned with joint pain  Patient did  arthritic supplement over-the-counter that she just started taking  She states she was on something that helps her relax at night, but she is not sure what medication it was        The following portions of the patient's history were reviewed and updated as appropriate: allergies, current medications, past family history, past medical history, past social history, past surgical history and problem list     Review of Systems   Constitutional: Negative for chills and fever  HENT: Negative for ear pain and sore throat  Eyes: Negative for pain and visual disturbance  Respiratory: Negative for cough and shortness of breath  Cardiovascular: Negative for chest pain and palpitations  Gastrointestinal: Negative for abdominal pain and vomiting  Genitourinary: Negative for dysuria and hematuria  Musculoskeletal: Negative for arthralgias and back pain  Skin: Negative for color change and rash  Neurological: Negative for dizziness, seizures, syncope, light-headedness and headaches  Psychiatric/Behavioral: Positive for sleep disturbance  Negative for dysphoric mood  The patient is not nervous/anxious  All other systems reviewed and are negative          /60   Pulse 68   Temp (!) 97 4 °F (36 3 °C)   Ht 4' 7" (1 397 m)   Wt 98 kg (216 lb)   SpO2 93%   BMI 50 20 kg/m²   Social History     Socioeconomic History    Marital status: Single     Spouse name: Not on file    Number of children: Not on file    Years of education: Not on file    Highest education level: Not on file   Occupational History    Not on file   Tobacco Use    Smoking status: Never Smoker    Smokeless tobacco: Never Used   Vaping Use    Vaping Use: Never used   Substance and Sexual Activity    Alcohol use: No    Drug use: No    Sexual activity: Never   Other Topics Concern    Not on file   Social History Narrative    Denied drinking coffee    Denied exercise habits    Most recent tobacco use screenin2018      Do you currently or have you served in the [employer]:   No      Were you activated, into active duty, as a member of the [employer] or as a Reservist:   No          Social Determinants of Health     Financial Resource Strain: Not on file   Food Insecurity: Not on file   Transportation Needs: Not on file   Physical Activity: Not on file   Stress: Not on file   Social Connections: Not on file   Intimate Partner Violence: Not on file   Housing Stability: Not on file     Past Medical History:   Diagnosis Date    Anemia 08/22/2018    Anxiety     Arthritis     AVB (atrioventricular block)     first degree    Cataract     CHF (congestive heart failure) (HCC)     COPD, mild (HCC)     Coronary artery disease     Dislocation of right shoulder joint     Frequent UTI     GERD (gastroesophageal reflux disease)     H/O: pneumonia     Heme positive stool     Hyperlipidemia     Hypertension     Hypothyroidism     Morbid obesity with BMI of 50 0-59 9, adult ([facility] [address] )     Obesity, morbid ([facility] [address] ) 08/22/2018    JANICE on CPAP     [facility]    Severe aortic stenosis     Simple goiter     Skin cyst     within the armpits, right    Wears glasses      Family History   Problem Relation Age of Onset    Diabetes Mother     Stroke Mother     Cancer Father     Lung cancer Father     Diabetes Sister     Heart disease Sister     Hypertension Sister     Coronary artery disease Family     Diabetes Family     Hypertension Family     Cancer Family     Stroke Family     Thyroid disease Neg Hx      Past Surgical History:   Procedure Laterality Date    BREAST BIOPSY      CARDIAC CATHETERIZATION      CARPAL TUNNEL RELEASE Bilateral     CHOLECYSTECTOMY      DILATION AND CURETTAGE OF UTERUS      HYSTEROSCOPY      MASTOID SURGERY      IN COLONOSCOPY FLX DX W/COLLJ SPEC WHEN PFRMD N/A 9/6/2018    Procedure: COLONOSCOPY;  Surgeon: Margarita Ellington MD;  [facility]; Service: Gastroenterology    IN ECHO TRANSESOPHAG R-T 2D W/PRB IMG ACQUISJ I&R N/A 10/9/2018    Procedure: INTRA-OP TRANSESOPHAGEAL ECHOCARDIOGRAM (GARRISON); Surgeon: Krystina Shrestha DO;  Location:  MAIN OR;  Service: Cardiac Surgery    IN ESOPHAGOGASTRODUODENOSCOPY TRANSORAL DIAGNOSTIC N/A 8/31/2018    Procedure: ESOPHAGOGASTRODUODENOSCOPY (EGD);   Surgeon: Rupa Pina MD;  [facility]; Service: Gastroenterology    IN REPLACE AORTIC VALVE OPENFEMORAL ARTERY APPROACH N/A 10/9/2018    Procedure: REPLACEMENT AORTIC VALVE TRANSCATHETER (TAVR) TRANSFEMORAL W/ 23 MM MENDOZA NOE S3 VALVE (ACCESS OF LEFT);   Surgeon: Oneida Quarles DO;  [facility];  Service: Cardiac Surgery    TOTAL HIP ARTHROPLASTY Left 2007    TOTAL KNEE ARTHROPLASTY Bilateral        Current Outpatient Medications:     albuterol (2 5 mg/3 mL) 0 083 % nebulizer solution, Take 1 vial (2 5 mg total) by nebulization every 6 (six) hours as needed for wheezing or shortness of breath, Disp: 360 mL, Rfl: 1    albuterol (PROVENTIL HFA,VENTOLIN HFA) 90 mcg/act inhaler, Inhale 2 puffs every 6 (six) hours as needed for wheezing, Disp: 1 Inhaler, Rfl: 3    alendronate (FOSAMAX) 70 mg tablet, TAKE 1 TABLET (70 MG TOTAL) BY MOUTH EVERY 7 DAYS, Disp: 12 tablet, Rfl: 1    aspirin (ECOTRIN LOW STRENGTH) 81 mg EC tablet, Take 1 tablet (81 mg total) by mouth daily, Disp: 100 tablet, Rfl: 0    b complex vitamins capsule, Take 1 capsule by mouth 2 (two) times a day  , Disp: , Rfl:     Calcium Carb-Cholecalciferol (CALCIUM 600 + D PO), Take 1 tablet by mouth 2 (two) times a day, Disp: , Rfl:     celecoxib (CeleBREX) 100 mg capsule, Take 1 capsule (100 mg total) by mouth 2 (two) times a day as needed for moderate pain, Disp: 60 capsule, Rfl: 0    Cranberry 250 MG TABS, Take by mouth, Disp: , Rfl:     Fesoterodine Fumarate ER (Toviaz) 8 MG TB24, Take 8 mg by mouth daily , Disp: , Rfl:     fluticasone-salmeterol (Advair) 250-50 mcg/dose inhaler, Inhale 1 puff 2 (two) times a day, Disp: 60 blister, Rfl: 2    levothyroxine 50 mcg tablet, TAKE 1 TABLET BY MOUTH EVERY DAY, Disp: 90 tablet, Rfl: 1    loratadine (CLARITIN) 10 mg tablet, TAKE 1 TABLET BY MOUTH EVERY DAY, Disp: 90 tablet, Rfl: 1    mometasone (ELOCON) 0 1 % cream, APPLY TO THE RIGHT EAR CANAL TWICE DAILY FOR 2 WEEKS, THEN DECREASE TO ONCE AT BEDTIME OR AS NEEDED, Disp: 45 g, Rfl: 0    olmesartan (BENICAR) 5 mg tablet, Take 2 tablets (10 mg total) by mouth daily, Disp: 180 tablet, Rfl: 3    omeprazole (PriLOSEC) 40 MG capsule, TAKE 1 CAPSULE BY MOUTH TWICE A DAY, Disp: 180 capsule, Rfl: 1    sertraline (ZOLOFT) 100 mg tablet, TAKE 1 TABLET BY MOUTH EVERY DAY, Disp: 90 tablet, Rfl: 0    simvastatin (ZOCOR) 40 mg tablet, TAKE 1 TABLET BY MOUTH DAILY AT BEDTIME, Disp: 90 tablet, Rfl: 1    traZODone (DESYREL) 50 mg tablet, Take 1 tablet (50 mg total) by mouth daily at bedtime, Disp: 30 tablet, Rfl: 0    Allergies   Allergen Reactions    Latex Rash    Neosporin [Neomycin-Bacitracin Zn-Polymyx] Rash and Other (See Comments)     hives per [facility] order          Lab Review   No visits with results within 2 Month(s) from this visit     Latest known visit with results is:   Hospital Outpatient Visit on 11/16/2021   Component Date Value    A4C EF 11/16/2021 65     LVOT stroke volume 11/16/2021 83 90     LVOT SI 11/16/2021 44 40     LVIDd 11/16/2021 3 40     LVIDS 11/16/2021 2 40     IVSd 11/16/2021 1 30     LVPWd 11/16/2021 1 30     LVOT diameter 11/16/2021 2 0     FS 11/16/2021 29     MV E' Tissue Velocity Se* 11/16/2021 5     E wave deceleration time 11/16/2021 408     MV Peak E Jose 11/16/2021 127     MV Peak A Jose 11/16/2021 1 69     AV LVOT peak gradient 11/16/2021 4     LVOT peak VTI 11/16/2021 26 72     LVOT peak jose 11/16/2021 1 05     RVID d 11/16/2021 4 1     TV S' 11/16/2021 1 1     NIKO A4C 11/16/2021 15 7     RAA A4C 11/16/2021 18 7     Ao VTI 11/16/2021 61 95     AV mean gradient 11/16/2021 16     LVOT mn grad 11/16/2021 3 0     AV peak gradient 11/16/2021 28     AV area by cont VTI 11/16/2021 1 4     AV area peak jose 11/16/2021 1 3     MV mean gradient antegra* 11/16/2021 6     MV peak gradient antegra* 11/16/2021 18     MV VTI 11/16/2021 65 27     MV stenosis pressure 1/2* 11/16/2021 0     TV peak gradient 11/16/2021 40     Ao root 11/16/2021 3 30     Aortic valve mean veloci* 11/16/2021 18 80     Tricuspid valve peak reg* 11/16/2021 3 18     Left ventricular stroke * 11/16/2021 28 00     LEFT VENTRICLE SYSTOLIC * 20/99/0054 19     LV DIASTOLIC VOLUME (MOD* 27/01/9039 47     LVOT area 11/16/2021 3 14     AV valve area 11/16/2021 1 35     MV valve area by continu* 11/16/2021 1 29     ZLVIDS 11/16/2021 -5 49     Ao peak stanley retrograde 11/16/2021 2 6     AV Velocity Ratio 11/16/2021 0 40         Imaging: No results found  Objective:     Physical Exam  Constitutional:       Appearance: She is well-developed  She is obese  Cardiovascular:      Rate and Rhythm: Normal rate and regular rhythm  Heart sounds: Normal heart sounds  No murmur heard  Pulmonary:      Effort: Pulmonary effort is normal  No respiratory distress  Breath sounds: Normal breath sounds  Skin:     General: Skin is warm and dry  Neurological:      Mental Status: She is alert and oriented to person, place, and time  There are no Patient Instructions on file for this visit  MABEL Hallman    Portions of the record may have been created with voice recognition software  Occasional wrong word or "sound a like" substitutions may have occurred due to the inherent limitations of voice recognition software  Read the chart carefully and recognize, using context, where substitutions have occurred

## 2022-03-15 DIAGNOSIS — M85.80 OSTEOPENIA, UNSPECIFIED LOCATION: ICD-10-CM

## 2022-03-15 RX ORDER — ALENDRONATE SODIUM 70 MG/1
70 TABLET ORAL
Qty: 12 TABLET | Refills: 1 | Status: SHIPPED | OUTPATIENT
Start: 2022-03-15

## 2022-03-25 DIAGNOSIS — I10 ESSENTIAL HYPERTENSION: ICD-10-CM

## 2022-03-25 RX ORDER — OLMESARTAN MEDOXOMIL 5 MG/1
TABLET ORAL
Qty: 180 TABLET | Refills: 3 | Status: SHIPPED | OUTPATIENT
Start: 2022-03-25

## 2022-03-30 ENCOUNTER — TELEPHONE (OUTPATIENT)
Dept: PULMONOLOGY | Facility: CLINIC | Age: 82
End: 2022-03-30

## 2022-04-01 ENCOUNTER — OFFICE VISIT (OUTPATIENT)
Dept: PULMONOLOGY | Facility: CLINIC | Age: 82
End: 2022-04-01
Payer: MEDICARE

## 2022-04-01 VITALS
BODY MASS INDEX: 49.3 KG/M2 | HEART RATE: 65 BPM | SYSTOLIC BLOOD PRESSURE: 124 MMHG | OXYGEN SATURATION: 92 % | DIASTOLIC BLOOD PRESSURE: 84 MMHG | TEMPERATURE: 98 F | HEIGHT: 55 IN | WEIGHT: 213 LBS

## 2022-04-01 DIAGNOSIS — I27.20 PULMONARY HYPERTENSION (HCC): ICD-10-CM

## 2022-04-01 DIAGNOSIS — E66.01 MORBID OBESITY (HCC): ICD-10-CM

## 2022-04-01 DIAGNOSIS — J96.11 CHRONIC HYPOXEMIC RESPIRATORY FAILURE (HCC): ICD-10-CM

## 2022-04-01 DIAGNOSIS — G47.33 OSA ON CPAP: ICD-10-CM

## 2022-04-01 DIAGNOSIS — Z99.89 OSA ON CPAP: ICD-10-CM

## 2022-04-01 DIAGNOSIS — R06.02 SOB (SHORTNESS OF BREATH): Primary | ICD-10-CM

## 2022-04-01 PROCEDURE — 99214 OFFICE O/P EST MOD 30 MIN: CPT | Performed by: INTERNAL MEDICINE

## 2022-04-01 NOTE — PROGRESS NOTES
Assessment/Plan:   Diagnoses and all orders for this visit:    SOB (shortness of breath)  -     Complete PFT with post Bronchodilator and Six Minute walk; Future  -     CT chest without contrast; Future    Chronic hypoxemic respiratory failure (HCC)    JANICE on CPAP  -     PAP DME Resupply/Reorder    Pulmonary hypertension (HCC)    Morbid obesity (HCC)        Shortness of breath and dyspnea on exertion likely multifactorial secondary to her morbid obesity and pulmonary hypertension and deconditioning and reactive airway disease  PFTs show mild restrictive lung disease with no evidence of any obstructive lung disease in a prior PFTs   Given her worsening shortness of breath will get a repeat complete PFT with post bronchodilator and a 6 minute walk test and follow-up   Also given worsening shortness of breath will order a CT of the chest without contrast for the shortness of breath and follow-up  Pulmonary hypertension with estimated pressure of 56 on echocardiogram done in 2019   Discussed with the patient to continue with Advair 1 puff twice daily and rinse mouth after use   Continue with albuterol for all MDI 2 puffs 4 times daily as needed   Chronic hypoxemic respiratory failure on 2 L of oxygen by nasal cannula continuous using it during exertion as well as nocturnal   Obstructive sleep apnea on CPAP continue with the current settings but she states that for the past few months has not used it because she does not like the mask and she needs supplies which have been ordered   Cleaning of the supplies and change of CPAP supplies discussed   Recommend weight loss and again discussed the need for compliance with the CPAP machine and consequences of untreated sleep apnea discussed   Follow-up in 3 months or p r n  earlier as needed  No follow-ups on file  All questions are answered to the patient's satisfaction and understanding  She verbalizes understanding    She is encouraged to call with any further questions or concerns  Portions of the record may have been created with voice recognition software  Occasional wrong word or "sound a like" substitutions may have occurred due to the inherent limitations of voice recognition software  Read the chart carefully and recognize, using context, where substitutions have occurred  Electronically Signed by Dwayne Petty MD    ______________________________________________________________________    Chief Complaint:   Chief Complaint   Patient presents with    Follow-up    Shortness of Breath    Wheezing    Cough       Patient ID: Sue Gongora is a 79 y o  y o  female has a past medical history of Anemia (08/22/2018), Anxiety, Arthritis, AVB (atrioventricular block), Cataract, CHF (congestive heart failure) ([address] ), COPD, mild ([address] ), Coronary artery disease, Dislocation of right shoulder joint, Frequent UTI, GERD (gastroesophageal reflux disease), H/O: pneumonia, Heme positive stool, Hyperlipidemia, Hypertension, Hypothyroidism, Morbid obesity with BMI of 50 0-59 9, adult ([address] ), Obesity, morbid ([facility] ) (08/22/2018), JANICE on CPAP, Pulmonary hypertension ([address] ) (08/22/2018), Severe aortic stenosis, Simple goiter, Skin cyst, and Wears glasses  4/1/2022  Patient presents today for follow-up visit  Patient is an 70-year-old female nonsmoker with past medical history of asthma, aortic stenosis status post TAVR, hypertension, JANICE on CPAP, hyperlipidemia, obesity, pulmonary hypertension, chronic respiratory failure on 2 L O2 with exertion and at night  But recently she states she has been using it even during the daytime     She is here today for follow-up  Continues to have chronic dyspnea on exertion, no shortness of breath at rest but she feels this in if she exerts herself she become short of breath  She continues with her Advair has not used her nebulizer once or twice a day      Review of Systems   Constitutional: Positive for fatigue  HENT: Negative  Eyes: Negative  Respiratory: Positive for shortness of breath  Cardiovascular: Negative  Gastrointestinal: Negative  Endocrine: Negative  Genitourinary: Negative  Musculoskeletal: Negative  Allergic/Immunologic: Negative  Neurological: Negative  Hematological: Negative  Psychiatric/Behavioral: Positive for sleep disturbance  Smoking history: She reports that she has never smoked   She has never used smokeless tobacco     The following portions of the patient's history were reviewed and updated as appropriate: allergies, current medications, past family history, past medical history, past social history, past surgical history and problem list     Immunization History   Administered Date(s) Administered    COVID-19 PFIZER VACCINE 0 3 ML IM 03/16/2021, 04/07/2021    Pneumococcal Conjugate 13-Valent 06/19/2018    Pneumococcal Polysaccharide PPV23 07/01/2019     Current Outpatient Medications   Medication Sig Dispense Refill    albuterol (2 5 mg/3 mL) 0 083 % nebulizer solution Take 1 vial (2 5 mg total) by nebulization every 6 (six) hours as needed for wheezing or shortness of breath 360 mL 1    albuterol (PROVENTIL HFA,VENTOLIN HFA) 90 mcg/act inhaler Inhale 2 puffs every 6 (six) hours as needed for wheezing 1 Inhaler 3    alendronate (FOSAMAX) 70 mg tablet Take 1 tablet (70 mg total) by mouth every 7 days 12 tablet 1    aspirin (ECOTRIN LOW STRENGTH) 81 mg EC tablet Take 1 tablet (81 mg total) by mouth daily 100 tablet 0    b complex vitamins capsule Take 1 capsule by mouth 2 (two) times a day        Calcium Carb-Cholecalciferol (CALCIUM 600 + D PO) Take 1 tablet by mouth 2 (two) times a day      celecoxib (CeleBREX) 100 mg capsule Take 1 capsule (100 mg total) by mouth 2 (two) times a day as needed for moderate pain 60 capsule 0    Cranberry 250 MG TABS Take by mouth      Fesoterodine Fumarate ER (Toviaz) 8 MG TB24 Take 8 mg by mouth daily       fluticasone-salmeterol (Advair) 250-50 mcg/dose inhaler Inhale 1 puff 2 (two) times a day 60 blister 2    levothyroxine 50 mcg tablet TAKE 1 TABLET BY MOUTH EVERY DAY 90 tablet 1    loratadine (CLARITIN) 10 mg tablet TAKE 1 TABLET BY MOUTH EVERY DAY 90 tablet 1    mometasone (ELOCON) 0 1 % cream APPLY TO THE RIGHT EAR CANAL TWICE DAILY FOR 2 WEEKS, THEN DECREASE TO ONCE AT BEDTIME OR AS NEEDED 45 g 0    olmesartan (BENICAR) 5 mg tablet TAKE 2 TABLETS BY MOUTH EVERY  tablet 3    omeprazole (PriLOSEC) 40 MG capsule TAKE 1 CAPSULE BY MOUTH TWICE A  capsule 1    sertraline (ZOLOFT) 100 mg tablet TAKE 1 TABLET BY MOUTH EVERY DAY 90 tablet 0    simvastatin (ZOCOR) 40 mg tablet TAKE 1 TABLET BY MOUTH DAILY AT BEDTIME 90 tablet 1    traZODone (DESYREL) 50 mg tablet Take 1 tablet (50 mg total) by mouth daily at bedtime 30 tablet 0     No current facility-administered medications for this visit  Allergies: Latex and Neosporin [neomycin-bacitracin zn-polymyx]    Objective:  Vitals:    04/01/22 1039   BP: 124/84   Pulse: 65   Temp: 98 °F (36 7 °C)   SpO2: 92%   Weight: 96 6 kg (213 lb)   Height: 4' 7" (1 397 m)   Oxygen Therapy  SpO2: 92 % (with2 lts of oxygen)    Wt Readings from Last 3 Encounters:   04/01/22 96 6 kg (213 lb)   03/14/22 98 kg (216 lb)   12/14/21 97 7 kg (215 lb 6 4 oz)     Body mass index is 49 51 kg/m²  Physical Exam  Constitutional:       Appearance: She is well-developed  HENT:      Head: Normocephalic and atraumatic  Eyes:      Pupils: Pupils are equal, round, and reactive to light  Neck:      Comments: Short and wide neck  Cardiovascular:      Rate and Rhythm: Normal rate and regular rhythm  Heart sounds: Normal heart sounds  Pulmonary:      Effort: Pulmonary effort is normal       Breath sounds: Normal breath sounds  Musculoskeletal:         General: Normal range of motion  Cervical back: Normal range of motion and neck supple  Skin:     General: Skin is warm and dry     Neurological: Mental Status: She is alert and oriented to person, place, and time     Psychiatric:         Behavior: Behavior normal            Diagnostics:  I have personally reviewed pertinent films in PACS  ESS: Total score: 4

## 2022-04-04 DIAGNOSIS — J45.20 MILD INTERMITTENT ASTHMA WITHOUT COMPLICATION: ICD-10-CM

## 2022-04-04 RX ORDER — ALBUTEROL SULFATE 2.5 MG/3ML
2.5 SOLUTION RESPIRATORY (INHALATION) EVERY 6 HOURS PRN
Qty: 360 ML | Refills: 5 | Status: SHIPPED | OUTPATIENT
Start: 2022-04-04

## 2022-04-08 DIAGNOSIS — F51.01 PRIMARY INSOMNIA: ICD-10-CM

## 2022-04-08 RX ORDER — TRAZODONE HYDROCHLORIDE 50 MG/1
TABLET ORAL
Qty: 30 TABLET | Refills: 0 | Status: SHIPPED | OUTPATIENT
Start: 2022-04-08 | End: 2022-04-18

## 2022-04-12 ENCOUNTER — TELEPHONE (OUTPATIENT)
Dept: CARDIOLOGY CLINIC | Facility: CLINIC | Age: 82
End: 2022-04-12

## 2022-04-12 NOTE — TELEPHONE ENCOUNTER
Pt called and stated BP was 152/63 P 47 then 146/56 P 48  Pt stated she hd a headache, lightheaded and felt SOB when walking, pt denied any other symptom  Pt stated last night BP was 125/108  Pt unsure is BP machine working correctly

## 2022-04-18 DIAGNOSIS — F51.01 PRIMARY INSOMNIA: ICD-10-CM

## 2022-04-18 RX ORDER — TRAZODONE HYDROCHLORIDE 50 MG/1
50 TABLET ORAL
Qty: 90 TABLET | Refills: 1 | Status: SHIPPED | OUTPATIENT
Start: 2022-04-18 | End: 2022-05-02

## 2022-04-18 RX ORDER — TRAZODONE HYDROCHLORIDE 50 MG/1
TABLET ORAL
Qty: 90 TABLET | Refills: 1 | Status: SHIPPED | OUTPATIENT
Start: 2022-04-18 | End: 2022-04-18

## 2022-04-26 ENCOUNTER — OFFICE VISIT (OUTPATIENT)
Dept: FAMILY MEDICINE CLINIC | Facility: CLINIC | Age: 82
End: 2022-04-26
Payer: MEDICARE

## 2022-04-26 ENCOUNTER — APPOINTMENT (EMERGENCY)
Dept: RADIOLOGY | Facility: HOSPITAL | Age: 82
End: 2022-04-26
Payer: MEDICARE

## 2022-04-26 ENCOUNTER — HOSPITAL ENCOUNTER (EMERGENCY)
Facility: HOSPITAL | Age: 82
Discharge: HOME | End: 2022-04-26
Attending: EMERGENCY MEDICINE
Payer: MEDICARE

## 2022-04-26 VITALS
SYSTOLIC BLOOD PRESSURE: 144 MMHG | HEIGHT: 55 IN | RESPIRATION RATE: 20 BRPM | DIASTOLIC BLOOD PRESSURE: 66 MMHG | WEIGHT: 232.59 LBS | OXYGEN SATURATION: 94 % | TEMPERATURE: 97.8 F | HEART RATE: 60 BPM | BODY MASS INDEX: 53.83 KG/M2

## 2022-04-26 VITALS
BODY MASS INDEX: 49.85 KG/M2 | HEIGHT: 55 IN | DIASTOLIC BLOOD PRESSURE: 72 MMHG | SYSTOLIC BLOOD PRESSURE: 110 MMHG | TEMPERATURE: 97.6 F | OXYGEN SATURATION: 96 % | HEART RATE: 56 BPM | WEIGHT: 215.4 LBS

## 2022-04-26 DIAGNOSIS — R06.02 SOB (SHORTNESS OF BREATH): Primary | ICD-10-CM

## 2022-04-26 DIAGNOSIS — R94.31 ECG ABNORMALITY: ICD-10-CM

## 2022-04-26 DIAGNOSIS — R30.0 DYSURIA: ICD-10-CM

## 2022-04-26 DIAGNOSIS — R53.83 FATIGUE, UNSPECIFIED TYPE: ICD-10-CM

## 2022-04-26 DIAGNOSIS — I50.9 ACUTE EXACERBATION OF CHF (CONGESTIVE HEART FAILURE) (HCC): Primary | ICD-10-CM

## 2022-04-26 LAB
2HR DELTA HS TROPONIN: 1 NG/L
ALBUMIN SERPL BCP-MCNC: 3.2 G/DL (ref 3.5–5)
ALP SERPL-CCNC: 100 U/L (ref 46–116)
ALT SERPL W P-5'-P-CCNC: 42 U/L (ref 12–78)
ANION GAP SERPL CALCULATED.3IONS-SCNC: 6 MMOL/L (ref 4–13)
APTT PPP: 35 SECONDS (ref 23–37)
AST SERPL W P-5'-P-CCNC: 36 U/L (ref 5–45)
ATRIAL RATE: 78 BPM
BACTERIA UR QL AUTO: ABNORMAL /HPF
BACTERIA UR QL AUTO: ABNORMAL /HPF
BASOPHILS # BLD AUTO: 0.05 THOUSANDS/ΜL (ref 0–0.1)
BASOPHILS NFR BLD AUTO: 1 % (ref 0–1)
BILIRUB SERPL-MCNC: 0.22 MG/DL (ref 0.2–1)
BILIRUB UR QL STRIP: NEGATIVE
BILIRUB UR QL STRIP: NEGATIVE
BUN SERPL-MCNC: 31 MG/DL (ref 5–25)
CALCIUM ALBUM COR SERPL-MCNC: 9.9 MG/DL (ref 8.3–10.1)
CALCIUM SERPL-MCNC: 9.3 MG/DL (ref 8.3–10.1)
CARDIAC TROPONIN I PNL SERPL HS: 10 NG/L
CARDIAC TROPONIN I PNL SERPL HS: 9 NG/L
CHLORIDE SERPL-SCNC: 107 MMOL/L (ref 100–108)
CLARITY UR: ABNORMAL
CLARITY UR: CLEAR
CO2 SERPL-SCNC: 26 MMOL/L (ref 21–32)
COLOR UR: YELLOW
COLOR UR: YELLOW
CREAT SERPL-MCNC: 1.03 MG/DL (ref 0.6–1.3)
EOSINOPHIL # BLD AUTO: 0.48 THOUSAND/ΜL (ref 0–0.61)
EOSINOPHIL NFR BLD AUTO: 5 % (ref 0–6)
ERYTHROCYTE [DISTWIDTH] IN BLOOD BY AUTOMATED COUNT: 15.4 % (ref 11.6–15.1)
GFR SERPL CREATININE-BSD FRML MDRD: 50 ML/MIN/1.73SQ M
GLUCOSE SERPL-MCNC: 110 MG/DL (ref 65–140)
GLUCOSE UR STRIP-MCNC: NEGATIVE MG/DL
GLUCOSE UR STRIP-MCNC: NEGATIVE MG/DL
HCT VFR BLD AUTO: 40 % (ref 34.8–46.1)
HGB BLD-MCNC: 12.4 G/DL (ref 11.5–15.4)
HGB UR QL STRIP.AUTO: NEGATIVE
HGB UR QL STRIP.AUTO: NEGATIVE
IMM GRANULOCYTES # BLD AUTO: 0.04 THOUSAND/UL (ref 0–0.2)
IMM GRANULOCYTES NFR BLD AUTO: 0 % (ref 0–2)
INR PPP: 1.08 (ref 0.84–1.19)
KETONES UR STRIP-MCNC: NEGATIVE MG/DL
KETONES UR STRIP-MCNC: NEGATIVE MG/DL
LACTATE SERPL-SCNC: 0.9 MMOL/L (ref 0.5–2)
LEUKOCYTE ESTERASE UR QL STRIP: ABNORMAL
LEUKOCYTE ESTERASE UR QL STRIP: ABNORMAL
LYMPHOCYTES # BLD AUTO: 1.92 THOUSANDS/ΜL (ref 0.6–4.47)
LYMPHOCYTES NFR BLD AUTO: 20 % (ref 14–44)
MCH RBC QN AUTO: 27.6 PG (ref 26.8–34.3)
MCHC RBC AUTO-ENTMCNC: 31 G/DL (ref 31.4–37.4)
MCV RBC AUTO: 89 FL (ref 82–98)
MONOCYTES # BLD AUTO: 0.77 THOUSAND/ΜL (ref 0.17–1.22)
MONOCYTES NFR BLD AUTO: 8 % (ref 4–12)
MUCOUS THREADS UR QL AUTO: ABNORMAL
NEUTROPHILS # BLD AUTO: 6.41 THOUSANDS/ΜL (ref 1.85–7.62)
NEUTS SEG NFR BLD AUTO: 66 % (ref 43–75)
NITRITE UR QL STRIP: POSITIVE
NITRITE UR QL STRIP: POSITIVE
NON-SQ EPI CELLS URNS QL MICRO: ABNORMAL /HPF
NON-SQ EPI CELLS URNS QL MICRO: ABNORMAL /HPF
NRBC BLD AUTO-RTO: 0 /100 WBCS
NT-PROBNP SERPL-MCNC: 897 PG/ML
P AXIS: 54 DEGREES
PH UR STRIP.AUTO: 5.5 [PH]
PH UR STRIP.AUTO: 6 [PH]
PLATELET # BLD AUTO: 371 THOUSANDS/UL (ref 149–390)
PMV BLD AUTO: 9.5 FL (ref 8.9–12.7)
POTASSIUM SERPL-SCNC: 4.6 MMOL/L (ref 3.5–5.3)
PR INTERVAL: 216 MS
PROCALCITONIN SERPL-MCNC: <0.05 NG/ML
PROT SERPL-MCNC: 7.2 G/DL (ref 6.4–8.2)
PROT UR STRIP-MCNC: NEGATIVE MG/DL
PROT UR STRIP-MCNC: NEGATIVE MG/DL
PROTHROMBIN TIME: 13.6 SECONDS (ref 11.6–14.5)
QRS AXIS: -32 DEGREES
QRSD INTERVAL: 78 MS
QT INTERVAL: 396 MS
QTC INTERVAL: 451 MS
RBC # BLD AUTO: 4.5 MILLION/UL (ref 3.81–5.12)
RBC #/AREA URNS AUTO: ABNORMAL /HPF
RBC #/AREA URNS AUTO: ABNORMAL /HPF
SL AMB  POCT GLUCOSE, UA: ABNORMAL
SL AMB LEUKOCYTE ESTERASE,UA: ABNORMAL
SL AMB POCT BILIRUBIN,UA: ABNORMAL
SL AMB POCT BLOOD,UA: ABNORMAL
SL AMB POCT CLARITY,UA: ABNORMAL
SL AMB POCT COLOR,UA: YELLOW
SL AMB POCT KETONES,UA: ABNORMAL
SL AMB POCT NITRITE,UA: ABNORMAL
SL AMB POCT PH,UA: 6
SL AMB POCT SPECIFIC GRAVITY,UA: 1.01
SL AMB POCT URINE PROTEIN: ABNORMAL
SL AMB POCT UROBILINOGEN: ABNORMAL
SODIUM SERPL-SCNC: 139 MMOL/L (ref 136–145)
SP GR UR STRIP.AUTO: 1.01 (ref 1–1.03)
SP GR UR STRIP.AUTO: 1.01 (ref 1–1.03)
T WAVE AXIS: 54 DEGREES
UROBILINOGEN UR QL STRIP.AUTO: 0.2 E.U./DL
UROBILINOGEN UR STRIP-ACNC: <2 MG/DL
VENTRICULAR RATE: 78 BPM
WBC # BLD AUTO: 9.67 THOUSAND/UL (ref 4.31–10.16)
WBC #/AREA URNS AUTO: ABNORMAL /HPF
WBC #/AREA URNS AUTO: ABNORMAL /HPF
WBC CLUMPS # UR AUTO: PRESENT /UL

## 2022-04-26 PROCEDURE — 81001 URINALYSIS AUTO W/SCOPE: CPT | Performed by: EMERGENCY MEDICINE

## 2022-04-26 PROCEDURE — 87086 URINE CULTURE/COLONY COUNT: CPT | Performed by: NURSE PRACTITIONER

## 2022-04-26 PROCEDURE — 87186 SC STD MICRODIL/AGAR DIL: CPT | Performed by: NURSE PRACTITIONER

## 2022-04-26 PROCEDURE — 93010 ELECTROCARDIOGRAM REPORT: CPT | Performed by: INTERNAL MEDICINE

## 2022-04-26 PROCEDURE — 99285 EMERGENCY DEPT VISIT HI MDM: CPT

## 2022-04-26 PROCEDURE — 93005 ELECTROCARDIOGRAM TRACING: CPT

## 2022-04-26 PROCEDURE — 93000 ELECTROCARDIOGRAM COMPLETE: CPT | Performed by: NURSE PRACTITIONER

## 2022-04-26 PROCEDURE — 87077 CULTURE AEROBIC IDENTIFY: CPT | Performed by: NURSE PRACTITIONER

## 2022-04-26 PROCEDURE — 81002 URINALYSIS NONAUTO W/O SCOPE: CPT | Performed by: NURSE PRACTITIONER

## 2022-04-26 PROCEDURE — 84145 PROCALCITONIN (PCT): CPT | Performed by: EMERGENCY MEDICINE

## 2022-04-26 PROCEDURE — 87040 BLOOD CULTURE FOR BACTERIA: CPT | Performed by: EMERGENCY MEDICINE

## 2022-04-26 PROCEDURE — 85610 PROTHROMBIN TIME: CPT | Performed by: EMERGENCY MEDICINE

## 2022-04-26 PROCEDURE — 87186 SC STD MICRODIL/AGAR DIL: CPT | Performed by: EMERGENCY MEDICINE

## 2022-04-26 PROCEDURE — 99285 EMERGENCY DEPT VISIT HI MDM: CPT | Performed by: EMERGENCY MEDICINE

## 2022-04-26 PROCEDURE — 99214 OFFICE O/P EST MOD 30 MIN: CPT | Performed by: NURSE PRACTITIONER

## 2022-04-26 PROCEDURE — 83605 ASSAY OF LACTIC ACID: CPT | Performed by: EMERGENCY MEDICINE

## 2022-04-26 PROCEDURE — 85730 THROMBOPLASTIN TIME PARTIAL: CPT | Performed by: EMERGENCY MEDICINE

## 2022-04-26 PROCEDURE — 81001 URINALYSIS AUTO W/SCOPE: CPT | Performed by: NURSE PRACTITIONER

## 2022-04-26 PROCEDURE — 94640 AIRWAY INHALATION TREATMENT: CPT

## 2022-04-26 PROCEDURE — 84484 ASSAY OF TROPONIN QUANT: CPT | Performed by: EMERGENCY MEDICINE

## 2022-04-26 PROCEDURE — 85025 COMPLETE CBC W/AUTO DIFF WBC: CPT | Performed by: EMERGENCY MEDICINE

## 2022-04-26 PROCEDURE — 71046 X-RAY EXAM CHEST 2 VIEWS: CPT

## 2022-04-26 PROCEDURE — 36415 COLL VENOUS BLD VENIPUNCTURE: CPT | Performed by: EMERGENCY MEDICINE

## 2022-04-26 PROCEDURE — 87077 CULTURE AEROBIC IDENTIFY: CPT | Performed by: EMERGENCY MEDICINE

## 2022-04-26 PROCEDURE — 96374 THER/PROPH/DIAG INJ IV PUSH: CPT

## 2022-04-26 PROCEDURE — 83880 ASSAY OF NATRIURETIC PEPTIDE: CPT | Performed by: EMERGENCY MEDICINE

## 2022-04-26 PROCEDURE — 87086 URINE CULTURE/COLONY COUNT: CPT | Performed by: EMERGENCY MEDICINE

## 2022-04-26 PROCEDURE — 80053 COMPREHEN METABOLIC PANEL: CPT | Performed by: EMERGENCY MEDICINE

## 2022-04-26 RX ORDER — FUROSEMIDE 20 MG/1
20 TABLET ORAL DAILY
Qty: 30 TABLET | Refills: 0 | Status: SHIPPED | OUTPATIENT
Start: 2022-04-26 | End: 2022-06-27 | Stop reason: SDUPTHER

## 2022-04-26 RX ORDER — FUROSEMIDE 10 MG/ML
20 INJECTION INTRAMUSCULAR; INTRAVENOUS ONCE
Status: COMPLETED | OUTPATIENT
Start: 2022-04-26 | End: 2022-04-26

## 2022-04-26 RX ORDER — ALBUTEROL SULFATE 2.5 MG/3ML
5 SOLUTION RESPIRATORY (INHALATION) ONCE
Status: COMPLETED | OUTPATIENT
Start: 2022-04-26 | End: 2022-04-26

## 2022-04-26 RX ADMIN — FUROSEMIDE 20 MG: 10 INJECTION, SOLUTION INTRAMUSCULAR; INTRAVENOUS at 17:48

## 2022-04-26 RX ADMIN — ALBUTEROL SULFATE 5 MG: 2.5 SOLUTION RESPIRATORY (INHALATION) at 16:25

## 2022-04-26 NOTE — ED NOTES
Ambulated patient  Patient's O2 dipped down to 82% before finishing       Jazmine [facility]  04/26/22 2515

## 2022-04-26 NOTE — ED NOTES
Pt  Ambulated with walker to bathroom  Pt  Returned to bed  Pt  Gait steady  Pt  Denied SOB until she sat on the bed, then pt   States "I am winded now but I was doing good for me "      Nathalie Hodgkins, RN  04/26/22 2028

## 2022-04-26 NOTE — PROGRESS NOTES
Assessment/Plan:    No problem-specific Assessment & Plan notes found for this encounter  Diagnoses and all orders for this visit:    SOB (shortness of breath)  -     POCT ECG  -     Transfer to other facility    Fatigue, unspecified type  -     POCT ECG  -     Transfer to other facility    ECG abnormality  -     Transfer to other facility    Dysuria  -     POCT urine dip  -     Urine culture  -     UA w Reflex to Microscopic w Reflex to Culture - Clinic Collect        Due to worsening symptoms and ECG changes, advised patient for ED evaluation  Patient agreeable to evaluation  EMS called, report given at bedside, patient transferred  Subjective:      Patient ID: Marina Garrison is a 79 y o  female  Patient presents to office with complaints of fatigue and SOB  Patient states symptoms have been occurring for a month  Saw pulmonology on 4/1, is scheduled to have PFTs on 4/29 and ct scan of chest on 5/7  Patient states over the past week, symptoms have worsened  Having trouble ambulating without getting SOB  States she uses oxygen usually at night, but is now using during the day  Patient notes orthopnea, sleeps with head elevated  Patient also notes lightheadedness with exertion and a near-syncopal episode yesterday while walking into her bank  At this time, patient denies chest pain, palpitations, dizziness  The following portions of the patient's history were reviewed and updated as appropriate: allergies, current medications, past family history, past medical history, past social history, past surgical history and problem list     Review of Systems   Constitutional: Positive for fatigue  Negative for chills  HENT: Negative for congestion, ear pain, sore throat and trouble swallowing  Respiratory: Positive for chest tightness and shortness of breath  Negative for cough  Cardiovascular: Positive for leg swelling  Negative for chest pain and palpitations     Gastrointestinal: Negative for nausea and vomiting  Genitourinary: Positive for dysuria  Negative for decreased urine volume, flank pain, frequency, hematuria and urgency  Musculoskeletal: Negative for arthralgias and myalgias  Skin: Negative for color change and rash  Neurological: Positive for light-headedness (worse with ambulation)  Negative for dizziness, syncope, numbness and headaches  Psychiatric/Behavioral: Negative for confusion  Objective:      /72 (BP Location: Right arm, Patient Position: Sitting)   Pulse 56   Temp 97 6 °F (36 4 °C) (Tympanic)   Ht 4' 7" (1 397 m)   Wt 97 7 kg (215 lb 6 4 oz)   SpO2 96%   BMI 50 06 kg/m²          Physical Exam  Constitutional:       Appearance: She is obese  She is ill-appearing  HENT:      Head: Normocephalic and atraumatic  Mouth/Throat:      Mouth: Mucous membranes are moist       Pharynx: Oropharynx is clear  No pharyngeal swelling or oropharyngeal exudate  Eyes:      Extraocular Movements: Extraocular movements intact  Pupils: Pupils are equal, round, and reactive to light  Neck:      Vascular: No JVD  Cardiovascular:      Rate and Rhythm: Normal rate and regular rhythm  Heart sounds: No murmur heard  Comments: POCT ECG: sinus rhythm, rate 68  When compared to ECG in 2019, depression seen in lead VR and V1  Pulmonary:      Effort: Tachypnea present  No respiratory distress  Breath sounds: Examination of the right-lower field reveals decreased breath sounds  Examination of the left-lower field reveals decreased breath sounds  Decreased breath sounds present  No wheezing, rhonchi or rales  Chest:      Chest wall: No mass or tenderness  Abdominal:      General: Abdomen is protuberant  Bowel sounds are normal       Palpations: Abdomen is soft  Tenderness: There is no abdominal tenderness  Musculoskeletal:         General: Normal range of motion  Cervical back: Normal range of motion and neck supple        Right lower le+ Edema present  Left lower le+ Edema present  Skin:     General: Skin is warm and dry  Capillary Refill: Capillary refill takes less than 2 seconds  Neurological:      General: No focal deficit present  Mental Status: She is alert and oriented to person, place, and time     Psychiatric:         Mood and Affect: Mood normal          Behavior: Behavior normal

## 2022-04-26 NOTE — DISCHARGE INSTRUCTIONS
Restart Lasix daily  Follow-up with your doctor  Return increasing shortness of breath worsening symptoms or any problems

## 2022-04-26 NOTE — ED NOTES
Ambulatory pulse ox placed on patient during walk   Pt ranged from 92-95%     [facility] [employer]  04/26/22 8081

## 2022-04-26 NOTE — ED PROVIDER NOTES
History  Chief Complaint   Patient presents with    Shortness of Breath     Pt  presents to ER via EMS from cardiologist office with c/o generalized weakness and fatigue also shortness of breath  HPI patient is an 79-year-old female presents from a primary care office apparently seen by an advanced practitioner there, complained of generalized weakness and shortness of breath  They reported some EKG changes  That EKG is unavailable to us EKG from August 16, 2019 and today are exactly the same  Patient denies any chest pain  She reports shortness of breath especially when she is walking  Patient apparently uses home oxygen at night and reports that she now sometimes has to use it during the day to get around  Again she denies any chest pain  There is no fever or chills  Denies any cough or congestion  She reported worsening fatigue in the office today and came to the emergency department by ambulance  Past medical history of anxiety anemia arthritis, CHF COPD, has pulmonary function scheduled for Friday I believe  Family history noncontributory  Social history nonsmoker no history of drug abuse    Prior to Admission Medications   Prescriptions Last Dose Informant Patient Reported? Taking?    Calcium Carb-Cholecalciferol (CALCIUM 600 + D PO) 4/26/2022 at Unknown time Self Yes Yes   Sig: Take 1 tablet by mouth 2 (two) times a day   Cranberry 250 MG TABS 4/26/2022 at Unknown time Self Yes Yes   Sig: Take by mouth   Fesoterodine Fumarate ER (Toviaz) 8 MG TB24 4/26/2022 at Unknown time Self Yes Yes   Sig: Take 8 mg by mouth daily    albuterol (2 5 mg/3 mL) 0 083 % nebulizer solution 4/25/2022 at Unknown time  No Yes   Sig: Take 3 mL (2 5 mg total) by nebulization every 6 (six) hours as needed for wheezing or shortness of breath   albuterol (PROVENTIL HFA,VENTOLIN HFA) 90 mcg/act inhaler 4/25/2022 at Unknown time Self No Yes   Sig: Inhale 2 puffs every 6 (six) hours as needed for wheezing   alendronate (FOSAMAX) 70 mg tablet Past Week at Unknown time  No Yes   Sig: Take 1 tablet (70 mg total) by mouth every 7 days   aspirin (ECOTRIN LOW STRENGTH) 81 mg EC tablet 4/26/2022 at Unknown time Self No Yes   Sig: Take 1 tablet (81 mg total) by mouth daily   b complex vitamins capsule 4/26/2022 at Unknown time Self Yes Yes   Sig: Take 1 capsule by mouth 2 (two) times a day     celecoxib (CeleBREX) 100 mg capsule Not Taking at Unknown time Self No No   Sig: Take 1 capsule (100 mg total) by mouth 2 (two) times a day as needed for moderate pain   Patient not taking: Reported on 4/26/2022    fluticasone-salmeterol (Advair) 250-50 mcg/dose inhaler 4/26/2022 at Unknown time Self No Yes   Sig: Inhale 1 puff 2 (two) times a day   levothyroxine 50 mcg tablet 4/26/2022 at Unknown time  No Yes   Sig: TAKE 1 TABLET BY MOUTH EVERY DAY   loratadine (CLARITIN) 10 mg tablet 4/26/2022 at Unknown time  No Yes   Sig: TAKE 1 TABLET BY MOUTH EVERY DAY   mometasone (ELOCON) 0 1 % cream Not Taking at Unknown time Self No No   Sig: APPLY TO THE RIGHT EAR CANAL TWICE DAILY FOR 2 WEEKS, THEN DECREASE TO ONCE AT BEDTIME OR AS NEEDED   Patient not taking: Reported on 4/26/2022    olmesartan (BENICAR) 5 mg tablet 4/26/2022 at Unknown time  No Yes   Sig: TAKE 2 TABLETS BY MOUTH EVERY DAY   omeprazole (PriLOSEC) 40 MG capsule 4/26/2022 at Unknown time  No Yes   Sig: TAKE 1 CAPSULE BY MOUTH TWICE A DAY   sertraline (ZOLOFT) 100 mg tablet 4/26/2022 at Unknown time  No Yes   Sig: TAKE 1 TABLET BY MOUTH EVERY DAY   simvastatin (ZOCOR) 40 mg tablet 4/26/2022 at Unknown time  No Yes   Sig: TAKE 1 TABLET BY MOUTH DAILY AT BEDTIME   traZODone (DESYREL) 50 mg tablet Not Taking at Unknown time  No No   Sig: Take 1 tablet (50 mg total) by mouth daily at bedtime   Patient not taking: Reported on 4/26/2022       Facility-Administered Medications: None       Past Medical History:   Diagnosis Date    Anemia 08/22/2018    Anxiety     Arthritis     AVB (atrioventricular block)     first degree    Cataract     CHF (congestive heart failure) (HCC)     COPD, mild (HCC)     Coronary artery disease     Dislocation of right shoulder joint     Frequent UTI     GERD (gastroesophageal reflux disease)     H/O: pneumonia     Heme positive stool     Hyperlipidemia     Hypertension     Hypothyroidism     Morbid obesity with BMI of 50 0-59 9, [facility]     Obesity, morbid ([facility] ) 08/22/2018    JANICE on CPAP     Pulmonary hypertension ([facility] ) 08/22/2018    Severe aortic stenosis     Simple goiter     Skin cyst     within the armpits, right    Wears glasses        Past Surgical History:   Procedure Laterality Date    BREAST BIOPSY      CARDIAC CATHETERIZATION      CARPAL TUNNEL RELEASE Bilateral     CHOLECYSTECTOMY      DILATION AND CURETTAGE OF UTERUS      HYSTEROSCOPY      MASTOID SURGERY      FL COLONOSCOPY FLX DX W/COLLJ SPEC WHEN PFRMD N/A 9/6/2018    Procedure: COLONOSCOPY;  Surgeon: Yamel Dash MD;  Location: [facility]; Service: Gastroenterology    FL ECHO TRANSESOPHAG R-T 2D W/PRB IMG ACQUISJ I&R N/A 10/9/2018    Procedure: INTRA-OP TRANSESOPHAGEAL ECHOCARDIOGRAM (GARRISON); Surgeon: Cherie Franco DO;  Location:  MAIN OR;  Service: Cardiac Surgery    FL ESOPHAGOGASTRODUODENOSCOPY TRANSORAL DIAGNOSTIC N/A 8/31/2018    Procedure: ESOPHAGOGASTRODUODENOSCOPY (EGD); Surgeon: Yamel Dash MD;  [facility]: [facility]; Service: Gastroenterology    FL REPLACE AORTIC VALVE OPENFEMORAL ARTERY APPROACH N/A 10/9/2018    Procedure: REPLACEMENT AORTIC VALVE TRANSCATHETER (TAVR) TRANSFEMORAL W/ 23 MM MENDOZA NOE S3 VALVE (ACCESS OF LEFT);   Surgeon: Cherie Franco DO;  [facility];  Service: Cardiac Surgery    TOTAL HIP ARTHROPLASTY Left 2007    TOTAL KNEE ARTHROPLASTY Bilateral        Family History   Problem Relation Age of Onset    Diabetes Mother     Stroke Mother     Cancer Father     Lung cancer Father     Diabetes Sister     Heart disease Sister     Hypertension Sister     Coronary artery disease Family     Diabetes Family     Hypertension Family     Cancer Family     Stroke Family     Thyroid disease Neg Hx      I have reviewed and agree with the history as documented  E-Cigarette/Vaping    E-Cigarette Use Never User      E-Cigarette/Vaping Substances    Nicotine No     THC No     CBD No     Flavoring No     Other No     Unknown No      Social History     Tobacco Use    Smoking status: Never Smoker    Smokeless tobacco: Never Used   Vaping Use    Vaping Use: Never used   Substance Use Topics    Alcohol use: No    Drug use: No       Review of Systems   Constitutional: Negative for diaphoresis, fatigue and fever  HENT: Negative for congestion, ear pain, nosebleeds and sore throat  Eyes: Negative for photophobia, pain, discharge and visual disturbance  Respiratory: Positive for shortness of breath  Negative for cough, choking, chest tightness and wheezing  Cardiovascular: Negative for chest pain and palpitations  Gastrointestinal: Negative for abdominal distention, abdominal pain, diarrhea and vomiting  Genitourinary: Negative for dysuria, flank pain and frequency  Musculoskeletal: Negative for back pain, gait problem and joint swelling  Skin: Negative for color change and rash  Neurological: Positive for weakness  Negative for dizziness, syncope and headaches  Psychiatric/Behavioral: Negative for behavioral problems and confusion  The patient is not nervous/anxious  All other systems reviewed and are negative  Reports shortness of breath with exertion over the last 2 months    Physical Exam  Physical Exam  Vitals and nursing note reviewed  Constitutional:       Appearance: She is well-developed  HENT:      Head: Normocephalic        Right Ear: External ear normal       Left Ear: External ear normal       Nose: Nose normal    Eyes:      General: Lids are normal  Pupils: Pupils are equal, round, and reactive to light  Cardiovascular:      Rate and Rhythm: Normal rate and regular rhythm  Pulses: Normal pulses  Heart sounds: Normal heart sounds  Pulmonary:      Effort: Pulmonary effort is normal  No respiratory distress  Breath sounds: Normal breath sounds  Comments: Good bilateral breath sounds, no wheezing, no rales  Abdominal:      General: Abdomen is flat  Bowel sounds are normal       Tenderness: There is no abdominal tenderness  Musculoskeletal:         General: No deformity  Normal range of motion  Cervical back: Normal range of motion and neck supple  Skin:     General: Skin is warm and dry  Neurological:      General: No focal deficit present  Mental Status: She is alert and oriented to person, place, and time  Psychiatric:         Mood and Affect: Mood normal          Behavior: Behavior normal          Thought Content:  Thought content normal      Pulse oximetry 94% on room air adequate oxygenation, there is no hypoxia    Vital Signs  ED Triage Vitals [04/26/22 1528]   Temperature Pulse Respirations Blood Pressure SpO2   97 8 °F (36 6 °C) 76 18 125/66 94 %      Temp Source Heart Rate Source Patient Position - Orthostatic VS BP Location FiO2 (%)   Oral Monitor Sitting Right arm --      Pain Score       No Pain           Vitals:    04/26/22 1528 04/26/22 1600 04/26/22 1919   BP: 125/66 148/63 144/66   Pulse: 76 69 60   Patient Position - Orthostatic VS: Sitting  Sitting         Visual Acuity      ED Medications  Medications   albuterol inhalation solution 5 mg (5 mg Nebulization Given 4/26/22 1625)   furosemide (LASIX) injection 20 mg (20 mg Intravenous Given 4/26/22 1748)       Diagnostic Studies  Results Reviewed     Procedure Component Value Units Date/Time    Blood culture #2 [[id number]] Collected: 04/26/22 1744    Lab Status: Preliminary result Specimen: Blood from Arm, Left Updated: 04/27/22 0201     Blood Culture Received in Microbiology Lab  Culture in Progress  Procalcitonin [[id number]]  (Normal) Collected: 04/26/22 1744    Lab Status: Final result Specimen: Blood from Arm, Left Updated: 04/26/22 1825     Procalcitonin <0 05 ng/ml     HS Troponin I 2hr [[id number]]  (Normal) Collected: 04/26/22 1746    Lab Status: Final result Specimen: Blood from Arm, Left Updated: 04/26/22 1824     hs TnI 2hr 10 ng/L      Delta 2hr hsTnI 1 ng/L     Lactic acid [[id number]]  (Normal) Collected: 04/26/22 1744    Lab Status: Final result Specimen: Blood from Arm, Left Updated: 04/26/22 1818     LACTIC ACID 0 9 mmol/L     Narrative:      Result may be elevated if tourniquet was used during collection  Urine Microscopic [[id number]]  (Abnormal) Collected: 04/26/22 1637    Lab Status: Final result Specimen: Urine, Clean Catch Updated: 04/26/22 1704     RBC, UA None Seen /hpf      WBC, UA 10-20 /hpf      Epithelial Cells Occasional /hpf      Bacteria, UA Moderate /hpf     Urine culture [[id number]] Collected: 04/26/22 1637    Lab Status:  In process Specimen: Urine, Clean Catch Updated: 04/26/22 1704    UA w Reflex to Microscopic w Reflex to Culture [[id number]]  (Abnormal) Collected: 04/26/22 1637    Lab Status: Final result Specimen: Urine, Clean Catch Updated: 04/26/22 1654     Color, UA Yellow     Clarity, UA Clear     Specific Gravity, UA 1 010     pH, UA 6 0     Leukocytes, UA Moderate     Nitrite, UA Positive     Protein, UA Negative mg/dl      Glucose, UA Negative mg/dl      Ketones, UA Negative mg/dl      Urobilinogen, UA 0 2 E U /dl      Bilirubin, UA Negative     Blood, UA Negative    HS Troponin 0hr (reflex protocol) [[id number]]  (Normal) Collected: 04/26/22 1540    Lab Status: Final result Specimen: Blood from Arm, Left Updated: 04/26/22 1611     hs TnI 0hr 9 ng/L     NT-BNP PRO [[id number]]  (Abnormal) Collected: 04/26/22 1540    Lab Status: Final result Specimen: Blood from Arm, Left Updated: 04/26/22 1610     NT-proBNP 897 pg/mL Comprehensive metabolic panel [[id number]]  (Abnormal) Collected: 04/26/22 1540    Lab Status: Final result Specimen: Blood from Arm, Left Updated: 04/26/22 1603     Sodium 139 mmol/L      Potassium 4 6 mmol/L      Chloride 107 mmol/L      CO2 26 mmol/L      ANION GAP 6 mmol/L      BUN 31 mg/dL      Creatinine 1 03 mg/dL      Glucose 110 mg/dL      Calcium 9 3 mg/dL      Corrected Calcium 9 9 mg/dL      AST 36 U/L      ALT 42 U/L      Alkaline Phosphatase 100 U/L      Total Protein 7 2 g/dL      Albumin 3 2 g/dL      Total Bilirubin 0 22 mg/dL      eGFR 50 ml/min/1 73sq m     Narrative:      National Kid[employer] Disease Foundation guidelines for Chronic Kidney Disease (CKD):     Stage 1 with normal or high GFR (GFR > 90 mL/min/1 73 square meters)    Stage 2 Mild CKD (GFR = 60-89 mL/min/1 73 square meters)    Stage 3A Moderate CKD (GFR = 45-59 mL/min/1 73 square meters)    Stage 3B Moderate CKD (GFR = 30-44 mL/min/1 73 square meters)    Stage 4 Severe CKD (GFR = 15-29 mL/min/1 73 square meters)    Stage 5 End Stage CKD (GFR <15 mL/min/1 73 square meters)  Note: GFR calculation is accurate only with a steady state creatinine    Protime-INR [[id number]]  (Normal) Collected: 04/26/22 1540    Lab Status: Final result Specimen: Blood from Arm, Left Updated: 04/26/22 1557     Protime 13 6 seconds      INR 1 08    APTT [[id number]]  (Normal) Collected: 04/26/22 1540    Lab Status: Final result Specimen: Blood from Arm, Left Updated: 04/26/22 1557     PTT 35 seconds     CBC and differential [[id number]]  (Abnormal) Collected: 04/26/22 1540    Lab Status: Final result Specimen: Blood from Arm, Left Updated: 04/26/22 1547     WBC 9 67 Thousand/uL      RBC 4 50 Million/uL      Hemoglobin 12 4 g/dL      Hematocrit 40 0 %      MCV 89 fL      MCH 27 6 pg      MCHC 31 0 g/dL      RDW 15 4 %      MPV 9 5 fL      Platelets 832 Thousands/uL      nRBC 0 /100 WBCs      Neutrophils Relative 66 %      Immat GRANS % 0 %      Lymphocytes Relative 20 %      Monocytes Relative 8 %      Eosinophils Relative 5 %      Basophils Relative 1 %      Neutrophils Absolute 6 41 Thousands/µL      Immature Grans Absolute 0 04 Thousand/uL      Lymphocytes Absolute 1 92 Thousands/µL      Monocytes Absolute 0 77 Thousand/µL      Eosinophils Absolute 0 48 Thousand/µL      Basophils Absolute 0 05 Thousands/µL     Blood culture #1 [[id number]]     Lab Status: No result Specimen: Blood                  XR chest 2 views   Final Result by Tia Perry MD (04/26 1702)      No acute cardiopulmonary disease  Workstation performed: [id number]                    Procedures  ECG 12 Lead Documentation Only    Date/Time: 4/26/2022 3:32 PM  Performed by: Khadijah Snow MD  Authorized by: Khadijah Snow MD     Indications / Diagnosis:  Shortness of breath  ECG reviewed by me, the ED Provider: yes    Patient location:  ED  Previous ECG:     Previous ECG:  Compared to current    Comparison ECG info:  August 16, 2019    Similarity:  No change  Interpretation:     Interpretation: non-specific    Rate:     ECG rate:  Seventy    ECG rate assessment: normal    Rhythm:     Rhythm: sinus rhythm    Comments:      Normal sinus rhythm no acute ST-T wave changes, no real change from prior EKG  ED Course        chest x-ray showed increased markings bilaterally, no pleural effusions, no definite infiltrate, no pneumothorax, interpreted by me I was initial   urine appears to be colonized  White count is normal at 9 6 no sign of inflammation hemoglobin was normal at 12 no sign of anemia  Electrolytes were normal normal creatinine 1 03  Identification of Seniors at Risk      Most Recent Value   (ISAR) Identification of Seniors at Risk    Before the illness or injury that brought you to the Emergency, did you need someone to help you on a regular basis?  0 Filed at: 04/26/2022 1531   In the last 24 hours, have you needed more help than usual? 0 Filed at: 04/26/2022 1531   Have you been hospitalized for one or more nights during the past 6 months? 0 Filed at: 04/26/2022 1531   In general, do you see well? --   In general, do you have serious problems with your memory? 0 Filed at: 04/26/2022 1531   Do you take more than three different medications every day? 1 Filed at: 04/26/2022 1531   ISAR Score 1 Filed at: 04/26/2022 1531          BNP is elevated 897 which is a new finding, compared to prior BNPs this is elevated  I spoke with Cardiology, patient is currently asymptomatic at rest, patient walked to the bathroom pulse oximetry and remained at 94%  Patient has a increase in her BNP and his dyspnea on exertion, I believe she may have some component of congestive heart failure  Patient was previously on Lasix and it was discontinued  Discussed with cardiology will restart Lasix  Discussed with patient  First be home  [facility] medical decision making 57-year-old female presents to the emergency department from her primary care office complaining of shortness of breath with exertion  Patient reports symptoms over last 2 months  Patient reports she has a history of which she says they tell her is asthma  She apparently his pulmonary functions coming up  Chest x-ray and BNP consistent with mild exacerbation of CHF  I spoke with Cardiology, will treat patient with IV Lasix here start daily Lasix  With the patient I discussed outpatient treatment follow-up we discussed indications to return      Disposition  Final diagnoses:   Acute exacerbation of CHF (congestive heart failure) ([address] )     Time reflects when diagnosis was documented in both MDM as applicable and the Disposition within this note     Time User Action Codes Description Comment    4/26/2022  4:50 PM Ros Morning Add [I50 9] Acute exacerbation of CHF (congestive heart failure) [facility])       ED Disposition     ED Disposition Condition Date/Time Comment    Discharge Stable Tue Apr 26, 2022  4:50 PM [facility] discharge to home/self care              Follow-up Information    None         Discharge Medication List as of 4/26/2022  5:13 PM      START taking these medications    Details   furosemide (Lasix) 20 mg tablet Take 1 tablet (20 mg total) by mouth daily In the morning, Starting Tue 4/26/2022, Until Thu 5/26/2022, Normal         CONTINUE these medications which have NOT CHANGED    Details   albuterol (2 5 mg/3 mL) 0 083 % nebulizer solution Take 3 mL (2 5 mg total) by nebulization every 6 (six) hours as needed for wheezing or shortness of breath, Starting Mon 4/4/2022, Normal      albuterol (PROVENTIL HFA,VENTOLIN HFA) 90 mcg/act inhaler Inhale 2 puffs every 6 (six) hours as needed for wheezing, Starting Tue 6/16/2020, Normal      alendronate (FOSAMAX) 70 mg tablet Take 1 tablet (70 mg total) by mouth every 7 days, Starting Tue 3/15/2022, Normal      aspirin (ECOTRIN LOW STRENGTH) 81 mg EC tablet Take 1 tablet (81 mg total) by mouth daily, Starting Thu 10/11/2018, Print      b complex vitamins capsule Take 1 capsule by mouth 2 (two) times a day  , Historical Med      Calcium Carb-Cholecalciferol (CALCIUM 600 + D PO) Take 1 tablet by mouth 2 (two) times a day, Historical Med      Cranberry 250 MG TABS Take by mouth, Historical Med      Fesoterodine Fumarate ER (Toviaz) 8 MG TB24 Take 8 mg by mouth daily , Historical Med      fluticasone-salmeterol (Advair) 250-50 mcg/dose inhaler Inhale 1 puff 2 (two) times a day, Starting Tue 9/28/2021, Normal      levothyroxine 50 mcg tablet TAKE 1 TABLET BY MOUTH EVERY DAY, Normal      loratadine (CLARITIN) 10 mg tablet TAKE 1 TABLET BY MOUTH EVERY DAY, Normal      olmesartan (BENICAR) 5 mg tablet TAKE 2 TABLETS BY MOUTH EVERY DAY, Normal      omeprazole (PriLOSEC) 40 MG capsule TAKE 1 CAPSULE BY MOUTH TWICE A DAY, Normal      sertraline (ZOLOFT) 100 mg tablet TAKE 1 TABLET BY MOUTH EVERY DAY, Normal      simvastatin (ZOCOR) 40 mg tablet TAKE 1 TABLET BY MOUTH DAILY AT BEDTIME, Normal      celecoxib (CeleBREX) 100 mg capsule Take 1 capsule (100 mg total) by mouth 2 (two) times a day as needed for moderate pain, Starting Fri 7/23/2021, Normal      mometasone (ELOCON) 0 1 % cream APPLY TO THE RIGHT EAR CANAL TWICE DAILY FOR 2 WEEKS, THEN DECREASE TO ONCE AT BEDTIME OR AS NEEDED, Normal      traZODone (DESYREL) 50 mg tablet Take 1 tablet (50 mg total) by mouth daily at bedtime, Starting Mon 4/18/2022, Normal             No discharge procedures on file      PDMP Review       Value Time User    PDMP Reviewed  Yes 9/10/2021  2:40 PM Rosemary Brewer, [facility] Provider  Electronically Signed by           Mariola Morataya MD  04/27/22 4388

## 2022-04-28 LAB
BACTERIA UR CULT: ABNORMAL
BACTERIA UR CULT: ABNORMAL

## 2022-04-29 ENCOUNTER — HOSPITAL ENCOUNTER (OUTPATIENT)
Dept: PULMONOLOGY | Facility: HOSPITAL | Age: 82
Discharge: HOME | End: 2022-04-29
Attending: INTERNAL MEDICINE
Payer: MEDICARE

## 2022-04-29 DIAGNOSIS — R06.02 SOB (SHORTNESS OF BREATH): ICD-10-CM

## 2022-04-29 LAB — BACTERIA UR CULT: ABNORMAL

## 2022-04-29 PROCEDURE — 94761 N-INVAS EAR/PLS OXIMETRY MLT: CPT

## 2022-04-29 PROCEDURE — 94727 GAS DIL/WSHOT DETER LNG VOL: CPT | Performed by: INTERNAL MEDICINE

## 2022-04-29 PROCEDURE — 94729 DIFFUSING CAPACITY: CPT | Performed by: INTERNAL MEDICINE

## 2022-04-29 PROCEDURE — 94060 EVALUATION OF WHEEZING: CPT | Performed by: INTERNAL MEDICINE

## 2022-04-29 PROCEDURE — 94618 PULMONARY STRESS TESTING: CPT | Performed by: INTERNAL MEDICINE

## 2022-04-29 PROCEDURE — 94727 GAS DIL/WSHOT DETER LNG VOL: CPT

## 2022-04-29 PROCEDURE — 94060 EVALUATION OF WHEEZING: CPT

## 2022-04-29 PROCEDURE — 94729 DIFFUSING CAPACITY: CPT

## 2022-04-29 RX ORDER — ALBUTEROL SULFATE 2.5 MG/3ML
2.5 SOLUTION RESPIRATORY (INHALATION) ONCE
Status: COMPLETED | OUTPATIENT
Start: 2022-04-29 | End: 2022-04-29

## 2022-04-29 RX ADMIN — ALBUTEROL SULFATE 2.5 MG: 2.5 SOLUTION RESPIRATORY (INHALATION) at 13:59

## 2022-05-02 ENCOUNTER — OFFICE VISIT (OUTPATIENT)
Dept: CARDIOLOGY CLINIC | Facility: CLINIC | Age: 82
End: 2022-05-02
Payer: MEDICARE

## 2022-05-02 VITALS
RESPIRATION RATE: 16 BRPM | DIASTOLIC BLOOD PRESSURE: 76 MMHG | WEIGHT: 214 LBS | HEIGHT: 55 IN | SYSTOLIC BLOOD PRESSURE: 140 MMHG | OXYGEN SATURATION: 93 % | BODY MASS INDEX: 49.53 KG/M2 | HEART RATE: 70 BPM

## 2022-05-02 DIAGNOSIS — I10 ESSENTIAL HYPERTENSION: ICD-10-CM

## 2022-05-02 DIAGNOSIS — G47.33 OSA (OBSTRUCTIVE SLEEP APNEA): ICD-10-CM

## 2022-05-02 DIAGNOSIS — I50.32 CHRONIC DIASTOLIC (CONGESTIVE) HEART FAILURE (HCC): ICD-10-CM

## 2022-05-02 DIAGNOSIS — I51.7 LVH (LEFT VENTRICULAR HYPERTROPHY): ICD-10-CM

## 2022-05-02 DIAGNOSIS — Z95.2 S/P TAVR (TRANSCATHETER AORTIC VALVE REPLACEMENT): ICD-10-CM

## 2022-05-02 DIAGNOSIS — I34.2 NON-RHEUMATIC MITRAL VALVE STENOSIS: ICD-10-CM

## 2022-05-02 DIAGNOSIS — E66.01 MORBID OBESITY (HCC): ICD-10-CM

## 2022-05-02 DIAGNOSIS — I27.20 PULMONARY HYPERTENSION (HCC): ICD-10-CM

## 2022-05-02 DIAGNOSIS — E78.2 MIXED HYPERLIPIDEMIA: ICD-10-CM

## 2022-05-02 DIAGNOSIS — I35.0 SEVERE AORTIC STENOSIS: Primary | ICD-10-CM

## 2022-05-02 LAB — BACTERIA BLD CULT: NORMAL

## 2022-05-02 PROCEDURE — 99214 OFFICE O/P EST MOD 30 MIN: CPT | Performed by: INTERNAL MEDICINE

## 2022-05-02 NOTE — PROGRESS NOTES
CARDIOLOGY OFFICE VISIT  [facility]  [facility], [address] [facility], [address], [address]  Tel: [phone]      NAME: Rudi Barragan  AGE: 79 y o  SEX: female  : 1940   MRN: [id number]      Chief Complaint:  Chief Complaint   Patient presents with    Follow-up         History of Present Illness:   Patient comes for follow up  States she is constantly short of breath despite using oxygen all the time  She is following up with Dr Alison Spring and a recent PFT was abnormal   She is scheduled for a CT scan of the chest  Pt denies associated or otherwise chest pain / pressure, palpitations, lightheadedness, syncope, swelling feet, claudication  Severe aortic stenosis S/P TAVR on 10/9/18 -  On ASA  Patient is aware of need for antibiotic prophylaxis prior to dental work     Chronic diastolic congestive heart failure -  Currently euvolemic  On furosemide, Olmesartan  Beta-blocker was likely held due to persistent SOB  States she watches her salt intake very closely     Hypertension, LVH -  Has had it for many years  Takes her medications regularly  Denies lightheadedness, headache, medication side effects        Hyperlipidemia -  Has had it for few years  Takes her medications regularly  Denies myalgia  Her PCP closely monitor the blood work      Mitral stenosis -  Stable  Follow-up with serial echocardiograms     PHTN - from JANICE, valvular disease     Morbid obesity -  Unable to lose weight due to inability to exercise     JANICE - now uses CPAP   She does have history of pulmonary hypertension     Past Medical History:  Past Medical History:   Diagnosis Date    Anemia 2018    Anxiety     Arthritis     AVB (atrioventricular block)     first degree    Cataract     CHF (congestive heart failure) (HCC)     COPD, mild (HCC)     Coronary artery disease     Dislocation of right shoulder joint     Frequent UTI     GERD (gastroesophageal reflux disease)     H/O: pneumonia     Heme positive stool     Hyperlipidemia     Hypertension     Hypothyroidism     Morbid obesity with BMI of 50 0-59 9, adult ([facility] [address] )     Obesity, morbid ([facility] [facility] ) 08/22/2018    JANICE on CPAP     Pulmonary hypertension ([facility] ) 08/22/2018    Severe aortic stenosis     Simple goiter     Skin cyst     within the armpits, right    Wears glasses          Past Surgical History:  Past Surgical History:   Procedure Laterality Date    BREAST BIOPSY      CARDIAC CATHETERIZATION      CARPAL TUNNEL RELEASE Bilateral     CHOLECYSTECTOMY      DILATION AND CURETTAGE OF UTERUS      HYSTEROSCOPY      MASTOID SURGERY      NY COLONOSCOPY FLX DX W/COLLJ SPEC WHEN PFRMD N/A 9/6/2018    Procedure: COLONOSCOPY;  Surgeon: Elly Perea MD;  [facility]; Service: Gastroenterology    NY ECHO TRANSESOPHAG R-T 2D W/PRB IMG ACQUISJ I&R N/A 10/9/2018    Procedure: INTRA-OP TRANSESOPHAGEAL ECHOCARDIOGRAM (GARRISON); Surgeon: Kathia Fuentes DO;  Location[facility];  Service: Cardiac Surgery    NY ESOPHAGOGASTRODUODENOSCOPY TRANSORAL DIAGNOSTIC N/A 8/31/2018    Procedure: ESOPHAGOGASTRODUODENOSCOPY (EGD); Surgeon: Elly Perea MD;  [facility]; Service: Gastroenterology    NY REPLACE AORTIC VALVE OPENFEMORAL ARTERY APPROACH N/A 10/9/2018    Procedure: REPLACEMENT AORTIC VALVE TRANSCATHETER (TAVR) TRANSFEMORAL W/ 23 MM MENDOZA NOE S3 VALVE (ACCESS OF LEFT);   Surgeon: Kathia Fuentes DO;  [facility];  Service: Cardiac Surgery    TOTAL HIP ARTHROPLASTY Left 2007    TOTAL KNEE ARTHROPLASTY Bilateral          Family History:  Family History   Problem Relation Age of Onset    Diabetes Mother     Stroke Mother     Cancer Father     Lung cancer Father     Diabetes Sister     Heart disease Sister     Hypertension Sister     Coronary artery disease Family     Diabetes Family     Hypertension Family     Cancer Family     Stroke Family     Thyroid disease Neg Hx          Social History:  Social History     Socioeconomic History    Marital status: Single     Spouse name: None    Number of children: None    Years of education: None    Highest education level: None   Occupational History    Occupation: retired   Tobacco Use    Smoking status: Never Smoker    Smokeless tobacco: Never Used   Vaping Use    Vaping Use: Never used   Substance and Sexual Activity    Alcohol use: No    Drug use: No    Sexual activity: Never   Other Topics Concern    None   Social History Narrative    Denied drinking coffee    Denied exercise habits    Most recent tobacco use screenin2018      Do you currently or have you served in [employer]:   No      Were you activated, into active duty, as a member of the [employer] or as a Reservist:   No          Social Determinants of Health     Financial Resource Strain: Not on file   Food Insecurity: Not on file   Transportation Needs: Not on file   Physical Activity: Not on file   Stress: Not on file   Social Connections: Not on file   Intimate Partner Violence: Not on file   Housing Stability: Not on file         Active Problems:  Patient Active Problem List   Diagnosis    Hypothyroid    Hypertension    Hyperlipidemia    Reactive airway disease without complication    JANICE (obstructive sleep apnea)    LVH (left ventricular hypertrophy)    Mitral annular calcification    Mitral valve stenosis    Pulmonary hypertension (HCC)    Chronic diastolic (congestive) heart failure (HCC)    Anemia    Obesity, morbid ([address] )    Gastroesophageal reflux disease without esophagitis    Arthritis    AVB (atrioventricular block)    COPD, mild ([address] )    Coronary artery disease    JANICE on CPAP    S/P TAVR (transcatheter aortic valve replacement)    Depression with anxiety    Chronic otitis media    Insomnia    Osteopenia    Depression, recurrent (HCC)    Radiculopathy, lumbar region    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[facility] )    Stage 3a chronic kidney disease ([facility] [facility] )         The following portions of the patient's history were reviewed and updated as appropriate: past medical history, past surgical history, past family history,  past social history, current medications, allergies and problem list       Review of Systems:  Constitutional: Denies fever, chills  Eyes: Denies eye redness, eye discharge  ENT: Denies hearing loss, sneezing, nasal discharge, sore throat   Respiratory: Denies cough, expectoration  +shortness of breath  Cardiovascular: Denies chest pain, palpitations  Gastrointestinal: Denies abdominal pain, nausea, vomiting, diarrhea  Genito-Urinary: Denies dysuria, incontinence  Musculoskeletal: Denies joint pain, muscle pain  Neurologic: Denies lightheadedness, syncope, headache, seizures  Endocrine: Denies polydipsia, temperature intolerance  Allergy and Immunology: Denies hives, insect bite sensitivity  Hematological and Lymphatic: Denies bleeding problems, swollen glands   Psychological: Denies depression, suicidal ideation, anxiety, panic  Dermatological: Denies pruritus, rash, skin lesion changes      Vitals:  Vitals:    05/02/22 1402   BP: 140/76   Pulse: 70   Resp: 16   SpO2: 93%       Body mass index is 49 74 kg/m²  Weight (last 2 days)     Date/Time Weight    05/02/22 1402 97 1 (214)            Physical Examination:  General: Patient is not in acute distress  Awake, alert, oriented in time, place and person  Responding to commands  Head: Normocephalic  Atraumatic  Eyes: Both pupils normal sized, round and reactive to light  Nonicteric  ENT: Normal external ear canals  Neck: Supple  JVP not raised  Trachea central  No thyromegaly  Lungs: Bilateral bronchovascular breath sounds with no crackles or rhonchi  Chest wall: No tenderness  Cardiovascular: RRR  S1 and S2 normal  No murmur, rub or gallop  Gastrointestinal: Abdomen soft, nontender  No guarding or rigidity  Liver and spleen not palpable  Bowel sounds present  Neurologic: Patient is awake, alert, oriented in time, place and person  Responding to commands  Moving all extremities  Integumentary:  No skin rash  Lymphatic: No cervical lymphadenopathy  Back: Symmetric   No CVA tenderness  Extremities: No clubbing, cyanosis or edema      Laboratory Results:  CBC with diff:   Lab Results   Component Value Date    WBC 9 67 2022    RBC 4 50 2022    HGB 12 4 2022    HCT 40 0 2022    MCV 89 2022    MCH 27 6 2022    RDW 15 4 (H) 2022     2022       CMP:  Lab Results   Component Value Date    CREATININE 1 03 2022    BUN 31 (H) 2022    K 4 6 2022     2022    CO2 26 2022    CO2 32 10/09/2018    GLUCOSE 101 10/09/2018    ALKPHOS 100 2022    ALT 42 2022    AST 36 2022       Lab Results   Component Value Date    HGBA1C 5 7 2018    MG 2 0 2018       Lab Results   Component Value Date    TROPONINI <0 02 2018    TROPONINI <0 02 2018    TROPONINI <0 02 2018       Lipid Profile:   No results found for: CHOL  Lab Results   Component Value Date    HDL 72 2021    HDL 63 2020    HDL 59 08/15/2018     Lab Results   Component Value Date    LDLCALC 70 2021    LDLCALC 63 2020    LDLCALC 72 08/15/2018     Lab Results   Component Value Date    TRIG 92 2021    TRIG 87 2020    TRIG 112 08/15/2018       Cardiac testing:   Results for orders placed during the hospital encounter of 19    Echo complete with contrast if indicated    Narrative  [place] [address], [address] [facility]  [phone]    Transthoracic Echocardiogram  Limited 2D, M-mode, Doppler, and Color Doppler    Study date:  16-Aug-2019    Patient: Ivana Noyola  MR number: [id number]  Account number: [de-identified]  : 1940  Age: 77 years  Gender: Female  Status: Outpatient  Location: [address] [place]  Height: 55 in  Weight: 214 9 lb  BP: 127/ 59 mmHg    Indications: Aortic valve disorder  Diagnoses: I35 0 - Nonrheumatic aortic (valve) stenosis    Sonographer:  ADELA Collins  Primary Physician:  Kusum Moya  Referring Physician:  MABEL Dewitt  Group:  [facility]  Interpreting Physician:  Daniel Will MD    SUMMARY    PROCEDURE INFORMATION:  This was a technically difficult study  Echocardiographic views were limited due to restricted patient mobility and decreased penetration  Intravenous contrast ( 1 2 ml Definity in NSS  , 4 ml) was administered to opacify the left ventricle  LEFT VENTRICLE:  Systolic function was normal  Ejection fraction was estimated to be 60 %  There were no regional wall motion abnormalities  Wall thickness was mildly increased  Doppler parameters were consistent with abnormal left ventricular relaxation (grade 1 diastolic dysfunction)  RIGHT VENTRICLE:  The size was normal   Systolic function was normal     MITRAL VALVE:  There was moderate annular calcification  There was mild stenosis  AORTIC VALVE:  A bioprosthesis (#23 Cornelius-Haresh TAVR) was present  It was not visualized well  It seemed to be well seated with no regurgitation  Peak and mean velocities (gradients) were 2 93 (34) and 2 18 (21) m/sec (mm Hg), respectively  The  calculated aortic valve area was 1 3 cm2 using the continuity equation  TRICUSPID VALVE:  There was mild regurgitation  Estimated peak PA pressure was 56 mmHg  HISTORY: PRIOR HISTORY: TAVR ( 23mm Cornelius Haresh)  Heart failure  COPD CAD  Risk factors: hypertension, hypercholesterolemia, and morbid obesity  PROCEDURE: The study was performed in the [address] [place] [address]  This was a routine study  The transthoracic approach was used  The study included limited 2D imaging, M-mode, complete spectral Doppler, and color Doppler   The  heart rate was 60 bpm, at the start of the study  Images were obtained from the parasternal, apical, subcostal, and suprasternal notch acoustic windows  Intravenous contrast ( 1 2 ml Definity in NSS  , 4 ml) was administered to opacify the  left ventricle  Echocardiographic views were limited due to restricted patient mobility and decreased penetration  This was a technically difficult study  LEFT VENTRICLE: Size was normal  Systolic function was normal  Ejection fraction was estimated to be 60 %  There were no regional wall motion abnormalities  Wall thickness was mildly increased  No evidence of apical thrombus  DOPPLER:  Doppler parameters were consistent with abnormal left ventricular relaxation (grade 1 diastolic dysfunction)  RIGHT VENTRICLE: The size was normal  Systolic function was normal  Wall thickness was normal     LEFT ATRIUM: Size was normal     RIGHT ATRIUM: Size was normal     MITRAL VALVE: There was moderate annular calcification  There was normal leaflet separation  DOPPLER: There was mild stenosis  There was no regurgitation  AORTIC VALVE: A bioprosthesis (#23 Cornelius-Haresh TAVR) was present  It was not visualized well  It seemed to be well seated with no regurgitation  Peak and mean velocities (gradients) were 2 93 (34) and 2 18 (21) m/sec (mm Hg),  respectively  The calculated aortic valve area was 1 3 cm2 using the continuity equation  DOPPLER: Transaortic velocity was within the normal range  There was no evidence for stenosis  There was no significant regurgitation  TRICUSPID VALVE: The valve structure was normal  There was normal leaflet separation  DOPPLER: There was no evidence for stenosis  There was mild regurgitation  Estimated peak PA pressure was 56 mmHg  PULMONIC VALVE: Leaflets exhibited normal thickness, no calcification, and normal cuspal separation  DOPPLER: The transpulmonic velocity was within the normal range  There was no significant regurgitation      PERICARDIUM: There was no pericardial effusion  The pericardium was normal in appearance  AORTA: The root exhibited normal size  SYSTEMIC VEINS: IVC: The inferior vena cava was normal in size  Respirophasic changes were normal     SYSTEM MEASUREMENT TABLES    2D  LVOT Diam: 1 98 cm    CW  AV Env  Ti: 361 56 ms  AV MaxP 73 mmHg  AV VTI: 74 32 cm  AV Vmax: 2 9 m/s  AV Vmean: 2 06 m/s  AV meanP 59 mmHg  TR MaxP 3 mmHg  TR Vmax: 3 65 m/s    MM  TAPSE: 2 43 cm    PW  VASU (VTI): 1 32 cm2  VASU Vmax: 1 27 cm2  E': 0 07 m/s  E/E': 18 92  HR: 110 24 BPM  LVOT Env  Ti: 372 98 ms  LVOT VTI: 31 98 cm  LVOT Vmax: 1 2 m/s  LVOT Vmean: 0 86 m/s  LVOT maxP 8 mmHg  LVOT meanPG: 3 25 mmHg  LVSV Dopp: 98 ml  MV A Jose: 1 67 m/s  MV Dec [place]: 3 48 m/s2  MV DecT: 358 15 ms  MV E Jose: 1 25 m/s  MV E/A Ratio: 0 75  MV PHT: 103 86 ms  MV VTI: 46 66 cm  MV Vmax: 1 59 m/s  MV Vmean: 0 86 m/s  MV maxPG: 10 16 mmHg  MV meanPG: 3 68 mmHg  MVA (VTI): 2 1 cm2  MVA By PHT: 2 12 cm2    [facility]    Prepared and electronically signed by    Marychuy Zelaya MD  Signed 16-Aug-2019 15:11:32      Medications:    Current Outpatient Medications:     albuterol (2 5 mg/3 mL) 0 083 % nebulizer solution, Take 3 mL (2 5 mg total) by nebulization every 6 (six) hours as needed for wheezing or shortness of breath, Disp: 360 mL, Rfl: 5    albuterol (PROVENTIL HFA,VENTOLIN HFA) 90 mcg/act inhaler, Inhale 2 puffs every 6 (six) hours as needed for wheezing, Disp: 1 Inhaler, Rfl: 3    alendronate (FOSAMAX) 70 mg tablet, Take 1 tablet (70 mg total) by mouth every 7 days, Disp: 12 tablet, Rfl: 1    aspirin (ECOTRIN LOW STRENGTH) 81 mg EC tablet, Take 1 tablet (81 mg total) by mouth daily, Disp: 100 tablet, Rfl: 0    b complex vitamins capsule, Take 1 capsule by mouth 2 (two) times a day  , Disp: , Rfl:     Calcium Carb-Cholecalciferol (CALCIUM 600 + D PO), Take 1 tablet by mouth 2 (two) times a day, Disp: , Rfl:     celecoxib (CeleBREX) 100 mg capsule, Take 1 capsule (100 mg total) by mouth 2 (two) times a day as needed for moderate pain, Disp: 60 capsule, Rfl: 0    Cranberry 250 MG TABS, Take by mouth, Disp: , Rfl:     Fesoterodine Fumarate ER (Toviaz) 8 MG TB24, Take 8 mg by mouth daily , Disp: , Rfl:     fluticasone-salmeterol (Advair) 250-50 mcg/dose inhaler, Inhale 1 puff 2 (two) times a day, Disp: 60 blister, Rfl: 2    furosemide (Lasix) 20 mg tablet, Take 1 tablet (20 mg total) by mouth daily In the morning, Disp: 30 tablet, Rfl: 0    levothyroxine 50 mcg tablet, TAKE 1 TABLET BY MOUTH EVERY DAY, Disp: 90 tablet, Rfl: 1    mometasone (ELOCON) 0 1 % cream, APPLY TO THE RIGHT EAR CANAL TWICE DAILY FOR 2 WEEKS, THEN DECREASE TO ONCE AT BEDTIME OR AS NEEDED, Disp: 45 g, Rfl: 0    olmesartan (BENICAR) 5 mg tablet, TAKE 2 TABLETS BY MOUTH EVERY DAY, Disp: 180 tablet, Rfl: 3    omeprazole (PriLOSEC) 40 MG capsule, TAKE 1 CAPSULE BY MOUTH TWICE A DAY, Disp: 180 capsule, Rfl: 1    sertraline (ZOLOFT) 100 mg tablet, TAKE 1 TABLET BY MOUTH EVERY DAY, Disp: 90 tablet, Rfl: 0    simvastatin (ZOCOR) 40 mg tablet, TAKE 1 TABLET BY MOUTH DAILY AT BEDTIME, Disp: 90 tablet, Rfl: 1    sulfamethoxazole-trimethoprim (BACTRIM DS) 800-160 mg per tablet, Take 1 tablet by mouth 2 (two) times a day for 5 days, Disp: 10 tablet, Rfl: 0    traZODone (DESYREL) 50 mg tablet, Take 1 tablet (50 mg total) by mouth daily at bedtime (Patient not taking: Reported on 5/2/2022 ), Disp: 90 tablet, Rfl: 1      Allergies: Allergies   Allergen Reactions    Latex Rash    Neosporin [Neomycin-Bacitracin Zn-Polymyx] Rash and Other (See Comments)     hives per [facility] order         Assessment and Plan:  1  Shortness of breath  Likely multifactorial from morbid obesity, restrictive lung disease, pulmonary hypertension, mitral stenosis  She is scheduled for a CT scan chest in a couple of weeks    Please continue to follow with pulmonology  Patient is currently not in acute heart failure    2  Severe aortic stenosis S/P TAVR (transcatheter aortic valve replacement)  Continue aspirin  Patient is aware of need for antibiotic prophylaxis prior to dental work  Follow-up echocardiogram ordered  - Echo complete w/ contrast if indicated; Future    3  Chronic diastolic (congestive) heart failure (HCC)  Euvolemic  Continue furosemide, olmesartan  Low-salt diet  Daily weight    4  Essential hypertension  BP stable  Continue current medication  Continue to monitor BP at home and call if abnormal    5  LVH (left ventricular hypertrophy)  Tight BP control    6  Mixed hyperlipidemia  Continue statin and diet control  Her PCP closely monitor the blood work    7  Non-rheumatic mitral valve stenosis  Follow-up echocardiogram ordered    8  Pulmonary hypertension ([address] )  Follow-up with pulmonologist    9  Morbid obesity ([facility] )  Try to lose weight    10  JANICE (obstructive sleep apnea)  Use CPAP regularly    Recommend aggressive risk factor modification and therapeutic lifestyle changes  Low-salt, low-calorie, low-fat, low-cholesterol diet with regular exercise and to optimize weight  I will defer the ordering and monitoring of necessity lab studies to you, but I am available and happy to review and manage any of the data at your request in the future  Discussed concepts of atherosclerosis, including signs and symptoms of cardiac disease  Previous studies were reviewed  Safety measures were reviewed  Questions were entertained and answered  Patient was advised to report any problems requiring medical attention  Follow-up with PCP and appropriate specialist and lab work as discussed  Return for follow up visit as scheduled or earlier, if needed  Thank you for allowing me to participate in the care and evaluation of your patient  Should you have any questions, please feel free to contact me        Reji Bell MD  8/3/8831,1:14 PM

## 2022-05-07 ENCOUNTER — HOSPITAL ENCOUNTER (OUTPATIENT)
Dept: CT IMAGING | Facility: HOSPITAL | Age: 82
Discharge: HOME | End: 2022-05-07
Attending: INTERNAL MEDICINE
Payer: MEDICARE

## 2022-05-07 DIAGNOSIS — R06.02 SOB (SHORTNESS OF BREATH): ICD-10-CM

## 2022-05-07 PROCEDURE — G1004 CDSM NDSC: HCPCS

## 2022-05-07 PROCEDURE — 71250 CT THORAX DX C-: CPT

## 2022-05-09 ENCOUNTER — OFFICE VISIT (OUTPATIENT)
Dept: FAMILY MEDICINE CLINIC | Facility: CLINIC | Age: 82
End: 2022-05-09
Payer: MEDICARE

## 2022-05-09 VITALS
DIASTOLIC BLOOD PRESSURE: 62 MMHG | OXYGEN SATURATION: 95 % | WEIGHT: 210 LBS | SYSTOLIC BLOOD PRESSURE: 140 MMHG | TEMPERATURE: 99.5 F | HEIGHT: 55 IN | RESPIRATION RATE: 18 BRPM | BODY MASS INDEX: 48.6 KG/M2 | HEART RATE: 53 BPM

## 2022-05-09 DIAGNOSIS — Z95.2 S/P TAVR (TRANSCATHETER AORTIC VALVE REPLACEMENT): ICD-10-CM

## 2022-05-09 DIAGNOSIS — G89.29 CHRONIC RIGHT SHOULDER PAIN: Primary | ICD-10-CM

## 2022-05-09 DIAGNOSIS — M25.511 CHRONIC RIGHT SHOULDER PAIN: Primary | ICD-10-CM

## 2022-05-09 DIAGNOSIS — E03.9 HYPOTHYROIDISM, UNSPECIFIED TYPE: Chronic | ICD-10-CM

## 2022-05-09 DIAGNOSIS — I10 PRIMARY HYPERTENSION: Chronic | ICD-10-CM

## 2022-05-09 DIAGNOSIS — Z98.818 OTHER DENTAL PROCEDURE STATUS: ICD-10-CM

## 2022-05-09 DIAGNOSIS — D44.7 GLOMUS TYMPANICUM TUMOR (HCC): ICD-10-CM

## 2022-05-09 DIAGNOSIS — I34.2 NONRHEUMATIC MITRAL VALVE STENOSIS: ICD-10-CM

## 2022-05-09 DIAGNOSIS — I50.32 CHRONIC DIASTOLIC (CONGESTIVE) HEART FAILURE (HCC): ICD-10-CM

## 2022-05-09 PROCEDURE — 99214 OFFICE O/P EST MOD 30 MIN: CPT

## 2022-05-09 RX ORDER — AMOXICILLIN 500 MG/1
2000 CAPSULE ORAL EVERY 8 HOURS SCHEDULED
Qty: 12 CAPSULE | Refills: 0 | Status: SHIPPED | OUTPATIENT
Start: 2022-05-09 | End: 2022-05-09

## 2022-05-09 RX ORDER — MELOXICAM 15 MG/1
15 TABLET ORAL DAILY
Qty: 30 TABLET | Refills: 0 | Status: SHIPPED | OUTPATIENT
Start: 2022-05-09 | End: 2022-07-10

## 2022-05-09 NOTE — PROGRESS NOTES
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[address] )       Postsurgical changes seen in the urine today  Has follow-up appointment scheduled ENT on the 12th  Cardiovascular and Mediastinum    Hypertension (Chronic)       BP okay today continue current medications  Have lab work done will call with results  Mitral valve stenosis       Murmur heard on exam today  Has echocardiogram scheduled  Will continue to monitor  Chronic diastolic (congestive) heart failure (HCC)     Wt Readings from Last 3 Encounters:   05/09/22 95 3 kg (210 lb)   05/02/22 97 1 kg (214 lb)   04/26/22 105 kg (232 lb 9 4 oz)           Follows with Cardiology  Echocardiogram scheduled  Continue medications            Other    S/P TAVR (transcatheter aortic valve replacement)       Amoxicillin 2 g ordered please take 1 hour prior to your scheduled dental procedure  Has echocardiogram scheduled  Other Visit Diagnoses     Chronic right shoulder pain    -  Primary    Start meloxicam once daily  Start diclofenac cream as needed for soreness in the shoulder  Do exercises and stretches  Make follow-up appointment with Ortho    Relevant Medications    meloxicam (Mobic) 15 mg tablet    Diclofenac Sodium (VOLTAREN) 1 %    Other Relevant Orders    Microalbumin / creatinine urine ratio    Urinalysis with microscopic    Ambulatory Referral to Orthopedic Surgery    Other dental procedure status        Relevant Medications    amoxicillin (AMOXIL) 500 mg capsule            Subjective:      Patient ID: Ashley Lazo is a 79 y o  female  Sherrel Halo is here today for right shoulder pain  It has been hurting for months  The arm doesn't feel weak  The pain is a 10/10  The pain is worse with movement  The pain can be stabbing at times   She has tried Bengay, ice, heat ibuprofen and tylenol  Nothing helps  She dislocated it 5 times in the past, she had surgery in 2004  The following portions of the patient's history were reviewed and updated as appropriate:   Past Medical History:  She has a past medical history of Anemia (08/22/2018), Anxiety, Arthritis, AVB (atrioventricular block), Cataract, CHF (congestive heart failure) ([facility] ), COPD, mild ([facility] ), Coronary artery disease, Dislocation of right shoulder joint, Frequent UTI, GERD (gastroesophageal reflux disease), H/O: pneumonia, Heme positive stool, Hyperlipidemia, Hypertension, Hypothyroidism, Morbid obesity with BMI of 50 0-59 9, adult ([facility] ), Obesity, morbid ([place] [address] ) (08/22/2018), JANICE on CPAP, Pulmonary hypertension ([place] [address] ) (08/22/2018), Severe aortic stenosis, Simple goiter, Skin cyst, and Wears glasses  ,  _______________________________________________________________________  Medical Problems:  does not have any pertinent problems on file ,  _______________________________________________________________________  Past Surgical History:   has a past surgical history that includes Cholecystectomy; Carpal tunnel release (Bilateral); Breast biopsy; Dilation and curettage of uterus; Hysteroscopy; pr esophagogastroduodenoscopy transoral diagnostic (N/A, 8/31/2018); pr colonoscopy flx dx w/collj spec when pfrmd (N/A, 9/6/2018); Cardiac catheterization; Total knee arthroplasty (Bilateral); Total hip arthroplasty (Left, 2007); pr replace aortic valve openfemoral artery approach (N/A, 10/9/2018); pr echo transesophag r-t 2d w/prb img acquisj i&r (N/A, 10/9/2018); and Mastoid surgery  ,  _______________________________________________________________________  Family History:  family history includes Cancer in her family and father; Coronary artery disease in her family; Diabetes in her family, mother, and sister; Heart disease in her sister;  Hypertension in her family and sister; Lung cancer in her father; Stroke in her family and mother ,  _______________________________________________________________________  Social History:   reports that she has never smoked  She has never used smokeless tobacco  She reports that she does not drink alcohol and does not use drugs  ,  _______________________________________________________________________  Allergies:  is allergic to latex and neosporin [neomycin-bacitracin zn-polymyx]     _______________________________________________________________________  Current Outpatient Medications   Medication Sig Dispense Refill    albuterol (2 5 mg/3 mL) 0 083 % nebulizer solution Take 3 mL (2 5 mg total) by nebulization every 6 (six) hours as needed for wheezing or shortness of breath 360 mL 5    albuterol (PROVENTIL HFA,VENTOLIN HFA) 90 mcg/act inhaler Inhale 2 puffs every 6 (six) hours as needed for wheezing 1 Inhaler 3    alendronate (FOSAMAX) 70 mg tablet Take 1 tablet (70 mg total) by mouth every 7 days 12 tablet 1    aspirin (ECOTRIN LOW STRENGTH) 81 mg EC tablet Take 1 tablet (81 mg total) by mouth daily 100 tablet 0    b complex vitamins capsule Take 1 capsule by mouth 2 (two) times a day        Calcium Carb-Cholecalciferol (CALCIUM 600 + D PO) Take 1 tablet by mouth 2 (two) times a day      Cranberry 250 MG TABS Take by mouth      Fesoterodine Fumarate ER (Toviaz) 8 MG TB24 Take 8 mg by mouth daily       fluticasone-salmeterol (Advair) 250-50 mcg/dose inhaler Inhale 1 puff 2 (two) times a day 60 blister 2    furosemide (Lasix) 20 mg tablet Take 1 tablet (20 mg total) by mouth daily In the morning 30 tablet 0    levothyroxine 50 mcg tablet TAKE 1 TABLET BY MOUTH EVERY DAY 90 tablet 1    mometasone (ELOCON) 0 1 % cream APPLY TO THE RIGHT EAR CANAL TWICE DAILY FOR 2 WEEKS, THEN DECREASE TO ONCE AT BEDTIME OR AS NEEDED 45 g 0    olmesartan (BENICAR) 5 mg tablet TAKE 2 TABLETS BY MOUTH EVERY  tablet 3    omeprazole (PriLOSEC) 40 MG capsule TAKE 1 CAPSULE BY MOUTH TWICE A  capsule 1    sertraline (ZOLOFT) 100 mg tablet TAKE 1 TABLET BY MOUTH EVERY DAY 90 tablet 0    simvastatin (ZOCOR) 40 mg tablet TAKE 1 TABLET BY MOUTH DAILY AT BEDTIME 90 tablet 1    amoxicillin (AMOXIL) 500 mg capsule Take 4 capsules (2,000 mg total) by mouth every 8 (eight) hours for 1 day 12 capsule 0    Diclofenac Sodium (VOLTAREN) 1 % Apply 2 g topically 4 (four) times a day 50 g 0    meloxicam (Mobic) 15 mg tablet Take 1 tablet (15 mg total) by mouth daily 30 tablet 0     No current facility-administered medications for this visit      _______________________________________________________________________  Review of Systems   Constitutional: Positive for fatigue  Negative for chills, diaphoresis and fever  HENT: Negative for congestion, ear pain, sinus pressure, sinus pain, sneezing and sore throat  Eyes: Negative for pain and visual disturbance  Respiratory: Positive for shortness of breath  Negative for cough and wheezing  Cardiovascular: Negative for chest pain and palpitations  Gastrointestinal: Negative for abdominal pain, constipation, diarrhea, nausea and vomiting  Genitourinary: Negative for decreased urine volume, dysuria, frequency, hematuria and urgency  Musculoskeletal: Positive for arthralgias, back pain and myalgias  Right shoulder and back pain  Skin: Negative for color change and rash  Neurological: Negative for dizziness, seizures, syncope and headaches  Psychiatric/Behavioral: Positive for dysphoric mood (sometimes)  Negative for agitation  All other systems reviewed and are negative  Objective:  Vitals:    05/09/22 1256   BP: 140/62   Pulse: (!) 53   Resp: 18   Temp: 99 5 °F (37 5 °C)   SpO2: 95%   Weight: 95 3 kg (210 lb)   Height: 4' 7" (1 397 m)     Body mass index is 48 81 kg/m²  Physical Exam  Vitals and nursing note reviewed  Constitutional:       General: She is not in acute distress  Appearance: Normal appearance   She is not ill-appearing  HENT:      Head: Normocephalic  Right Ear: Tympanic membrane, ear canal and external ear normal  There is no impacted cerumen  Ears:      Comments: Left ear prior surgery, grossly abnormal  Follows with ENT next visit is 5/12     Nose: Nose normal       Mouth/Throat:      Mouth: Mucous membranes are moist       Pharynx: No posterior oropharyngeal erythema  Eyes:      Conjunctiva/sclera: Conjunctivae normal    Cardiovascular:      Rate and Rhythm: Normal rate and regular rhythm  Pulses: Normal pulses  Heart sounds: Murmur heard  Pulmonary:      Effort: Pulmonary effort is normal  No respiratory distress  Breath sounds: Wheezing present  Abdominal:      General: Bowel sounds are normal       Palpations: Abdomen is soft  Musculoskeletal:         General: Tenderness present  No swelling  Right shoulder: Tenderness present  Decreased range of motion  Cervical back: Normal range of motion  No tenderness  Right lower leg: No edema  Left lower leg: No edema  Lymphadenopathy:      Cervical: No cervical adenopathy  Skin:     General: Skin is warm and dry  Neurological:      Mental Status: She is alert and oriented to person, place, and time  Psychiatric:         Mood and Affect: Mood normal          Behavior: Behavior normal          Thought Content:  Thought content normal          Judgment: Judgment normal

## 2022-05-09 NOTE — ASSESSMENT & PLAN NOTE
Amoxicillin 2 g ordered please take 1 hour prior to your scheduled dental procedure  Has echocardiogram scheduled

## 2022-05-09 NOTE — ASSESSMENT & PLAN NOTE
Wt Readings from Last 3 Encounters:   05/09/22 95 3 kg (210 lb)   05/02/22 97 1 kg (214 lb)   04/26/22 105 kg (232 lb 9 4 oz)           Follows with Cardiology  Echocardiogram scheduled    Continue medications

## 2022-05-19 ENCOUNTER — TELEPHONE (OUTPATIENT)
Dept: FAMILY MEDICINE CLINIC | Facility: CLINIC | Age: 82
End: 2022-05-19

## 2022-05-19 ENCOUNTER — OFFICE VISIT (OUTPATIENT)
Dept: PULMONOLOGY | Facility: CLINIC | Age: 82
End: 2022-05-19
Payer: MEDICARE

## 2022-05-19 ENCOUNTER — TELEPHONE (OUTPATIENT)
Dept: OBGYN CLINIC | Facility: CLINIC | Age: 82
End: 2022-05-19

## 2022-05-19 VITALS
TEMPERATURE: 97.8 F | HEART RATE: 59 BPM | WEIGHT: 210 LBS | DIASTOLIC BLOOD PRESSURE: 78 MMHG | BODY MASS INDEX: 48.6 KG/M2 | OXYGEN SATURATION: 92 % | HEIGHT: 55 IN | SYSTOLIC BLOOD PRESSURE: 138 MMHG

## 2022-05-19 DIAGNOSIS — J96.11 CHRONIC HYPOXEMIC RESPIRATORY FAILURE (HCC): ICD-10-CM

## 2022-05-19 DIAGNOSIS — R06.02 SOB (SHORTNESS OF BREATH): Primary | ICD-10-CM

## 2022-05-19 DIAGNOSIS — Z99.89 OSA ON CPAP: ICD-10-CM

## 2022-05-19 DIAGNOSIS — E66.01 MORBID OBESITY (HCC): ICD-10-CM

## 2022-05-19 DIAGNOSIS — J44.9 CHRONIC OBSTRUCTIVE PULMONARY DISEASE, UNSPECIFIED COPD TYPE (HCC): ICD-10-CM

## 2022-05-19 DIAGNOSIS — G47.33 OSA ON CPAP: ICD-10-CM

## 2022-05-19 PROCEDURE — 99214 OFFICE O/P EST MOD 30 MIN: CPT | Performed by: INTERNAL MEDICINE

## 2022-05-19 NOTE — TELEPHONE ENCOUNTER
Caden Christopher called in to let you know that she was unable to get in early with [id number] [facility]  She went ahead and made an appointment with [facility] instead

## 2022-05-19 NOTE — TELEPHONE ENCOUNTER
Hello,  Please advise if the following patient can be seen sooner onto the schedule:    Patient: Melchor Proctor    : 1940    MRN: [id number]    Call back #: [phone]    Insurance: Medicare    Reason for appointment: Right shoulder pain // scheduled for 22// in a lot of pain  Will not travel   Prev surg   Requested doctor/location: Any @ [facility]       Thank you

## 2022-05-19 NOTE — PROGRESS NOTES
Assessment/Plan:   Diagnoses and all orders for this visit:    SOB (shortness of breath)    Chronic obstructive pulmonary disease, unspecified COPD type ([facility] )  -     fluticasone-salmeterol (Advair) 250-50 mcg/dose inhaler; Inhale 1 puff  in the morning and 1 puff in the evening  Chronic hypoxemic respiratory failure (HCC)  -     fluticasone-salmeterol (Advair) 250-50 mcg/dose inhaler; Inhale 1 puff  in the morning and 1 puff in the evening  JANICE on CPAP    Morbid obesity (HCC)      shortness of breath and dyspnea on exertion likely multifactorial secondary to her morbid obesity and pulmonary hypertension and deconditioning and reactive airway disease   PFTs recently done again demonstrating restrictive lung disease with normal DLCO likely secondary to her morbid obesity, no significant obstructive lung disease  6 minute walk test demonstrating the need for 3 liters of oxygen on exertion discussed with the patient she does have portable tank as well    she currently did bring it into the office as well  CT of the chest report and images reviewed the lung parenchyma is normal except for very mild atelectasis  No evidence of any lung nodule  Pulmonary hypertension with estimated pressure of 56 on the echocardiogram done in 2019   Discussed with the patient to continue with Advair 1 puff twice daily rinse mouth after use   Continue with albuterol MDI 2 puffs 4 times daily as needed   Chronic hypoxemic respiratory failure on 3 liters of oxygen by nasal cannula during exertion as well as nocturnal   Obstructive sleep apnea on CPAP continue with the current settings  Cleaning of the supplies and change of CPAP supplies discussed   Recommend weight loss   Follow-up in 6 months or p r n  earlier as needed  No follow-ups on file  All questions are answered to the patient's satisfaction and understanding  She verbalizes understanding  She is encouraged to call with any further questions or concerns      Portions of the record may have been created with voice recognition software  Occasional wrong word or "sound a like" substitutions may have occurred due to the inherent limitations of voice recognition software  Read the chart carefully and recognize, using context, where substitutions have occurred  Electronically Signed by Judge Oliverio MD    ______________________________________________________________________    Chief Complaint: No chief complaint on file  Patient ID: Briana Britton is a 79 y o  y o  female has a past medical history of Anemia (08/22/2018), Anxiety, Arthritis, AVB (atrioventricular block), Cataract, CHF (congestive heart failure) ([address] ), COPD, mild ([address] ), Coronary artery disease, Dislocation of right shoulder joint, Frequent UTI, GERD (gastroesophageal reflux disease), H/O: pneumonia, Heme positive stool, Hyperlipidemia, Hypertension, Hypothyroidism, Morbid obesity with BMI of 50 0-59 9, adult ([address] ), Obesity, morbid ([address] ) (08/22/2018), JANICE on CPAP, Pulmonary hypertension ([address] ) (08/22/2018), Severe aortic stenosis, Simple goiter, Skin cyst, and Wears glasses  5/19/2022  Patient presents today for follow-up visit  Patient is an 59-year-old female nonsmoker with past medical history of asthma, aortic stenosis status post TAVR, hypertension, JANICE on CPAP, hyperlipidemia, obesity, pulmonary hypertension, chronic respiratory failure on 2 L O2 with exertion and at night  But recently she states she has been using it even during the daytime     She is here today for follow-up  Continues to have chronic dyspnea on exertion, no shortness of breath at rest but she feels she exerts herself she become short of breath which is slowly getting worse she states  She has also gained significant weight in the past year  She continues with her Advair has not used her nebulizer once or twice a day      Review of Systems   Constitutional: Positive for fatigue  HENT: Negative  Eyes: Negative      Respiratory: Positive for shortness of breath  Cardiovascular: Negative  Gastrointestinal: Negative  Endocrine: Negative  Genitourinary: Negative  Musculoskeletal: Negative  Allergic/Immunologic: Negative  Neurological: Negative  Hematological: Negative  Psychiatric/Behavioral: Positive for sleep disturbance  Smoking history: She reports that she has never smoked  She has never used smokeless tobacco     The following portions of the patient's history were reviewed and updated as appropriate: allergies, current medications, past family history, past medical history, past social history, past surgical history and problem list     Immunization History   Administered Date(s) Administered    COVID-19 PFIZER VACCINE 0 3 ML IM 03/16/2021, 04/07/2021    Pneumococcal Conjugate 13-Valent 06/19/2018    Pneumococcal Polysaccharide PPV23 07/01/2019     Current Outpatient Medications   Medication Sig Dispense Refill    albuterol (2 5 mg/3 mL) 0 083 % nebulizer solution Take 3 mL (2 5 mg total) by nebulization every 6 (six) hours as needed for wheezing or shortness of breath 360 mL 5    albuterol (PROVENTIL HFA,VENTOLIN HFA) 90 mcg/act inhaler Inhale 2 puffs every 6 (six) hours as needed for wheezing 1 Inhaler 3    alendronate (FOSAMAX) 70 mg tablet Take 1 tablet (70 mg total) by mouth every 7 days 12 tablet 1    aspirin (ECOTRIN LOW STRENGTH) 81 mg EC tablet Take 1 tablet (81 mg total) by mouth daily 100 tablet 0    b complex vitamins capsule Take 1 capsule by mouth 2 (two) times a day        Calcium Carb-Cholecalciferol (CALCIUM 600 + D PO) Take 1 tablet by mouth 2 (two) times a day      Cranberry 250 MG TABS Take by mouth      Diclofenac Sodium (VOLTAREN) 1 % Apply 2 g topically 4 (four) times a day 50 g 0    Fesoterodine Fumarate ER (Toviaz) 8 MG TB24 Take 8 mg by mouth daily       fluticasone-salmeterol (Advair) 250-50 mcg/dose inhaler Inhale 1 puff  in the morning and 1 puff in the evening  60 blister 5    furosemide (Lasix) 20 mg tablet Take 1 tablet (20 mg total) by mouth daily In the morning 30 tablet 0    levothyroxine 50 mcg tablet TAKE 1 TABLET BY MOUTH EVERY DAY 90 tablet 1    meloxicam (Mobic) 15 mg tablet Take 1 tablet (15 mg total) by mouth daily 30 tablet 0    mometasone (ELOCON) 0 1 % cream APPLY TO THE RIGHT EAR CANAL TWICE DAILY FOR 2 WEEKS, THEN DECREASE TO ONCE AT BEDTIME OR AS NEEDED 45 g 0    olmesartan (BENICAR) 5 mg tablet TAKE 2 TABLETS BY MOUTH EVERY  tablet 3    omeprazole (PriLOSEC) 40 MG capsule TAKE 1 CAPSULE BY MOUTH TWICE A  capsule 1    sertraline (ZOLOFT) 100 mg tablet TAKE 1 TABLET BY MOUTH EVERY DAY 90 tablet 0    simvastatin (ZOCOR) 40 mg tablet TAKE 1 TABLET BY MOUTH DAILY AT BEDTIME 90 tablet 1     No current facility-administered medications for this visit  Allergies: Latex and Neosporin [neomycin-bacitracin zn-polymyx]    Objective:  Vitals:    05/19/22 1150   BP: 138/78   Pulse: 59   Temp: 97 8 °F (36 6 °C)   SpO2: 92%   Weight: 95 3 kg (210 lb)   Height: 4' 7" (1 397 m)   Oxygen Therapy  SpO2: 92 %    Wt Readings from Last 3 Encounters:   05/19/22 95 3 kg (210 lb)   05/12/22 95 3 kg (210 lb)   05/09/22 95 3 kg (210 lb)     Body mass index is 48 81 kg/m²  Physical Exam  Constitutional:       Appearance: She is well-developed  HENT:      Head: Normocephalic and atraumatic  Eyes:      Pupils: Pupils are equal, round, and reactive to light  Neck:      Comments: Short and wide neck  Cardiovascular:      Rate and Rhythm: Normal rate and regular rhythm  Heart sounds: Normal heart sounds  Pulmonary:      Effort: Pulmonary effort is normal       Breath sounds: Normal breath sounds  Musculoskeletal:         General: Normal range of motion  Cervical back: Normal range of motion and neck supple  Skin:     General: Skin is warm and dry  Neurological:      Mental Status: She is alert and oriented to person, place, and time  Psychiatric:         Behavior: Behavior normal            Diagnostics:  I have personally reviewed pertinent films in PACS  XR chest 2 views    Result Date: 4/26/2022  Narrative: CHEST INDICATION:   Shortness of breath with exertion  COMPARISON:  6/27/2019 EXAM PERFORMED/VIEWS:  XR CHEST PA & LATERAL FINDINGS: Cardiomediastinal silhouette appears unremarkable  Evidence of prior heart valve repair, there is stable from prior The lungs are clear  No pneumothorax or pleural effusion  Arthritic changes at the left shoulder and possible combination of old traumatic and postsurgical changes of the right shoulder, similar to prior  Spinal disc degeneration noted at multiple levels  Impression: No acute cardiopulmonary disease  Workstation performed: [id number]     CT chest without contrast    Result Date: 5/10/2022  Narrative: CT CHEST WITHOUT IV CONTRAST INDICATION:   R06 02: Shortness of breath  COMPARISON:  CT chest 1/15/2019  TECHNIQUE: CT examination of the chest was performed without intravenous contrast  Axial, sagittal, and coronal 2D reformatted images were created from the source data and submitted for interpretation  Radiation dose length product (DLP) for this visit:  34 33 96 mGy-cm   This examination, like all CT scans performed in the [facility], was performed utilizing techniques to minimize radiation dose exposure, including the use of iterative reconstruction and automated exposure control  FINDINGS: LUNGS:  Scattered areas of atelectasis/scarring are noted within the bilateral lower lobes as well as the lingula and right middle lobe  No focal consolidations  There is discrete no tracheal or endobronchial lesion  PLEURA:  Unremarkable  HEART/GREAT VESSELS: The heart is prominent in size but stable from prior exams  No sizable pericardial effusion  Prosthetic aortic valve is again demonstrated  MEDIASTINUM AND SCOTTY:  Unremarkable   CHEST WALL AND LOWER NECK:  The visualized thyroid gland is enlarged but stable from prior exams  VISUALIZED STRUCTURES IN THE UPPER ABDOMEN:  Status post cholecystectomy  OSSEOUS STRUCTURES:  No acute fracture or destructive osseous lesion  Severe degenerative changes are seen involving bilateral shoulders  Suture anchors are seen within the glenoid and adjacent humeral head  Mild degenerative changes are seen within the spine  Impression: 1  Mild atelectasis without acute focal pulmonary consolidation or pleural effusion  2   Stable thyromegaly  Workstation performed: [id number]     Complete PFT with post Bronchodilator and Six Minute walk    Result Date: 2022  Narrative: Arthea Median 79 y o  :1940 [id number] PULMONARY FUNCTION TEST Indication:  Shortness of breath Requesting provider:  Dr Yoselyn Leigh Results: Patient gave good effort and cooperation  The reulsts of the test appear valid; although the ATS standard for 'end of test' was not met  Baseline oxygen saturation was 91% on room air with a heart rate of 63  Spirometry: FEV1/FVC Ratio is 90  FEV1 is 1 02L which is 74% predicted  FVC is 1 14L which is 64% predicted  After albuterol 2 5 milligrams via nebulizer, FEV1 increased to 1 20 liters, 16 percent improvement and FVC increased to 1 32 liters, 16 percent improvement Flow volume loop: Normal Lung volumes: Total lung capacity is 2 72L which is 73% predicted  Residual volume is 66% predicted  Diffusing capacity: 97% predicted 6 Minute Walk: The patient's starting pulse ox was 91% on room air with a heart rate of 63 and Adriana dyspnea score of 1/10  They walked a total of 160m in 6minutes stopping 3 times to place or adjust oxygen  At 1 minute of exertion, sats dropped to 86 percent patient was placed on 2 liters  After an additional 2 minutes of walking saturations decreased to 89 percent and oxygen was increased to 3 liters nasal cannula    At completion the pulse ox was 93% on 3 liters with a heart rate of 85 and Adriana dyspnea of score of 3/10  Impression: 1  No large airway obstruction 2  While both FEV1 and FVC increased by 16 percent after albuterol, neither met ats standard for bronchodilator responsiveness as they did not improve by 200 milliliters 3  Decreased forced vital capacity and total lung capacity consistent with restrictive lung disease 4  Normal diffusion capacity 5   6 minute walk as per above with exertional hypoxemia and dyspnea requiring 3 liters supplemental oxygen Portions of the record may have been created with voice recognition software  Occasional wrong word or "sound a like" substitutions may have occurred due to the inherent limitations of voice recognition software  Read the chart carefully and recognize, using context, where substitutions have occurred

## 2022-05-23 NOTE — ASSESSMENT & PLAN NOTE
Personalized Preventive Plan for [facility] - 5/23/2022  Medicare offers a range of preventive health benefits. Some of the tests and screenings are paid in full while other may be subject to a deductible, co-insurance, and/or copay. Some of these benefits include a comprehensive review of your medical history including lifestyle, illnesses that may run in your family, and various assessments and screenings as appropriate. After reviewing your medical record and screening and assessments performed today your provider may have ordered immunizations, labs, imaging, and/or referrals for you. A list of these orders (if applicable) as well as your Preventive Care list are included within your After Visit Summary for your review. Other Preventive Recommendations:    · A preventive eye exam performed by an eye specialist is recommended every 1-2 years to screen for glaucoma; cataracts, macular degeneration, and other eye disorders. · A preventive dental visit is recommended every 6 months. · Try to get at least 150 minutes of exercise per week or 10,000 steps per day on a pedometer . · Order or download the FREE \"Exercise & Physical Activity: Your Everyday Guide\" from [employer]. Call 7-983.625.6468 or search [employer] online. · You need 7936-2352 mg of calcium and 5033-4413 IU of vitamin D per day. It is possible to meet your calcium requirement with diet alone, but a vitamin D supplement is usually necessary to meet this goal.  · When exposed to the sun, use a sunscreen that protects against both UVA and UVB radiation with an SPF of 30 or greater. Reapply every 2 to 3 hours or after sweating, drying off with a towel, or swimming. · Always wear a seat belt when traveling in a car. Always wear a helmet when riding a bicycle or motorcycle. · Continue pravastatin

## 2022-06-01 ENCOUNTER — TELEPHONE (OUTPATIENT)
Dept: CARDIOLOGY CLINIC | Facility: CLINIC | Age: 82
End: 2022-06-01

## 2022-06-01 DIAGNOSIS — I50.9 ACUTE EXACERBATION OF CHF (CONGESTIVE HEART FAILURE) (HCC): ICD-10-CM

## 2022-06-01 NOTE — TELEPHONE ENCOUNTER
Patient Shamika Ramona contacting office [phone] inquiring if Dr Marleta Duane would like her to continue to stay on the lasix which she was put on while the patient was in the hospital

## 2022-06-06 ENCOUNTER — HOSPITAL ENCOUNTER (EMERGENCY)
Facility: HOSPITAL | Age: 82
Discharge: HOME | End: 2022-06-06
Attending: EMERGENCY MEDICINE | Admitting: EMERGENCY MEDICINE
Payer: MEDICARE

## 2022-06-06 VITALS
OXYGEN SATURATION: 97 % | HEIGHT: 55 IN | SYSTOLIC BLOOD PRESSURE: 180 MMHG | RESPIRATION RATE: 31 BRPM | WEIGHT: 198 LBS | DIASTOLIC BLOOD PRESSURE: 75 MMHG | HEART RATE: 57 BPM | BODY MASS INDEX: 45.82 KG/M2 | TEMPERATURE: 98.2 F

## 2022-06-06 DIAGNOSIS — R55 NEAR SYNCOPE: ICD-10-CM

## 2022-06-06 DIAGNOSIS — N39.0 UTI (URINARY TRACT INFECTION): Primary | ICD-10-CM

## 2022-06-06 LAB
2HR DELTA HS TROPONIN: 0 NG/L
ANION GAP SERPL CALCULATED.3IONS-SCNC: 10 MMOL/L (ref 4–13)
BACTERIA UR QL AUTO: NORMAL /HPF
BILIRUB UR QL STRIP: NEGATIVE
BUN SERPL-MCNC: 31 MG/DL (ref 5–25)
CALCIUM SERPL-MCNC: 9.7 MG/DL (ref 8.3–10.1)
CARDIAC TROPONIN I PNL SERPL HS: 7 NG/L
CARDIAC TROPONIN I PNL SERPL HS: 7 NG/L
CHLORIDE SERPL-SCNC: 107 MMOL/L (ref 100–108)
CLARITY UR: CLEAR
CO2 SERPL-SCNC: 23 MMOL/L (ref 21–32)
COLOR UR: YELLOW
CREAT SERPL-MCNC: 0.84 MG/DL (ref 0.6–1.3)
GFR SERPL CREATININE-BSD FRML MDRD: 64 ML/MIN/1.73SQ M
GLUCOSE SERPL-MCNC: 88 MG/DL (ref 65–140)
GLUCOSE UR STRIP-MCNC: NEGATIVE MG/DL
HCT VFR BLD AUTO: 39.2 % (ref 34.8–46.1)
HGB BLD-MCNC: 11.9 G/DL (ref 11.5–15.4)
HGB UR QL STRIP.AUTO: NEGATIVE
KETONES UR STRIP-MCNC: NEGATIVE MG/DL
LEUKOCYTE ESTERASE UR QL STRIP: ABNORMAL
NITRITE UR QL STRIP: POSITIVE
NON-SQ EPI CELLS URNS QL MICRO: NORMAL /HPF
PH UR STRIP.AUTO: 6 [PH]
POTASSIUM SERPL-SCNC: 5.1 MMOL/L (ref 3.5–5.3)
PROT UR STRIP-MCNC: NEGATIVE MG/DL
RBC #/AREA URNS AUTO: NORMAL /HPF
SODIUM SERPL-SCNC: 140 MMOL/L (ref 136–145)
SP GR UR STRIP.AUTO: 1.01 (ref 1–1.03)
UROBILINOGEN UR QL STRIP.AUTO: 0.2 E.U./DL
WBC #/AREA URNS AUTO: NORMAL /HPF

## 2022-06-06 PROCEDURE — 80048 BASIC METABOLIC PNL TOTAL CA: CPT | Performed by: EMERGENCY MEDICINE

## 2022-06-06 PROCEDURE — 99284 EMERGENCY DEPT VISIT MOD MDM: CPT | Performed by: EMERGENCY MEDICINE

## 2022-06-06 PROCEDURE — 99284 EMERGENCY DEPT VISIT MOD MDM: CPT

## 2022-06-06 PROCEDURE — 93005 ELECTROCARDIOGRAM TRACING: CPT

## 2022-06-06 PROCEDURE — 84484 ASSAY OF TROPONIN QUANT: CPT | Performed by: EMERGENCY MEDICINE

## 2022-06-06 PROCEDURE — 36415 COLL VENOUS BLD VENIPUNCTURE: CPT | Performed by: EMERGENCY MEDICINE

## 2022-06-06 PROCEDURE — 81001 URINALYSIS AUTO W/SCOPE: CPT | Performed by: EMERGENCY MEDICINE

## 2022-06-06 PROCEDURE — 85018 HEMOGLOBIN: CPT | Performed by: EMERGENCY MEDICINE

## 2022-06-06 PROCEDURE — 85014 HEMATOCRIT: CPT | Performed by: EMERGENCY MEDICINE

## 2022-06-06 RX ORDER — SULFAMETHOXAZOLE AND TRIMETHOPRIM 800; 160 MG/1; MG/1
1 TABLET ORAL 2 TIMES DAILY
Qty: 10 TABLET | Refills: 0 | Status: SHIPPED | OUTPATIENT
Start: 2022-06-06 | End: 2022-06-11

## 2022-06-06 RX ORDER — SULFAMETHOXAZOLE AND TRIMETHOPRIM 800; 160 MG/1; MG/1
1 TABLET ORAL ONCE
Status: COMPLETED | OUTPATIENT
Start: 2022-06-06 | End: 2022-06-06

## 2022-06-06 RX ADMIN — SULFAMETHOXAZOLE AND TRIMETHOPRIM 1 TABLET: 800; 160 TABLET ORAL at 21:49

## 2022-06-06 NOTE — ED PROVIDER NOTES
History  Chief Complaint   Patient presents with    Syncope     Patient presents for fall vs vsycope  Patient reports that she remembers everything and doesn't think she passed out  Patient denies chest pain, dizziness, SOB at present     Patient presents emergency department for evaluation of near syncope  She was at [facility], [facility] when she felt acutely weak and started stumbling backwards  She did not sustain any injuries with the fall and she was not unconscious  At this time she feels back to normal and right away stated that she does not want to be admitted  She denies any chest pain, any trouble breathing, any abdominal pain  History provided by:  Patient   used: No    Syncope  Episode history:  Single  Most recent episode: Today  Duration:  30 seconds  Timing:  Unable to specify  Witnessed: yes    Associated symptoms: no anxiety, no chest pain, no confusion, no diaphoresis, no difficulty breathing, no fever, no focal weakness, no headaches, no nausea, no palpitations, no recent injury, no seizures, no shortness of breath and no vomiting        Prior to Admission Medications   Prescriptions Last Dose Informant Patient Reported? Taking?    Calcium Carb-Cholecalciferol (CALCIUM 600 + D PO)  Self Yes No   Sig: Take 1 tablet by mouth 2 (two) times a day   Cranberry 250 MG TABS  Self Yes No   Sig: Take by mouth   Diclofenac Sodium (VOLTAREN) 1 %  Self No No   Sig: Apply 2 g topically 4 (four) times a day   Fesoterodine Fumarate ER (Toviaz) 8 MG TB24  Self Yes No   Sig: Take 8 mg by mouth daily    albuterol (2 5 mg/3 mL) 0 083 % nebulizer solution  Self No No   Sig: Take 3 mL (2 5 mg total) by nebulization every 6 (six) hours as needed for wheezing or shortness of breath   albuterol (PROVENTIL HFA,VENTOLIN HFA) 90 mcg/act inhaler  Self No No   Sig: Inhale 2 puffs every 6 (six) hours as needed for wheezing   alendronate (FOSAMAX) 70 mg tablet  Self No No   Sig: Take 1 tablet (70 mg total) by mouth every 7 days   aspirin (ECOTRIN LOW STRENGTH) 81 mg EC tablet  Self No No   Sig: Take 1 tablet (81 mg total) by mouth daily   b complex vitamins capsule  Self Yes No   Sig: Take 1 capsule by mouth 2 (two) times a day     fluticasone-salmeterol (Advair) 250-50 mcg/dose inhaler   No No   Sig: Inhale 1 puff  in the morning and 1 puff in the evening     furosemide (Lasix) 20 mg tablet  Self No No   Sig: Take 1 tablet (20 mg total) by mouth daily In the morning   levothyroxine 50 mcg tablet  Self No No   Sig: TAKE 1 TABLET BY MOUTH EVERY DAY   meloxicam (Mobic) 15 mg tablet  Self No No   Sig: Take 1 tablet (15 mg total) by mouth daily   mometasone (ELOCON) 0 1 % cream  Self No No   Sig: APPLY TO THE RIGHT EAR CANAL TWICE DAILY FOR 2 WEEKS, THEN DECREASE TO ONCE AT BEDTIME OR AS NEEDED   olmesartan (BENICAR) 5 mg tablet  Self No No   Sig: TAKE 2 TABLETS BY MOUTH EVERY DAY   omeprazole (PriLOSEC) 40 MG capsule  Self No No   Sig: TAKE 1 CAPSULE BY MOUTH TWICE A DAY   sertraline (ZOLOFT) 100 mg tablet  Self No No   Sig: TAKE 1 TABLET BY MOUTH EVERY DAY   simvastatin (ZOCOR) 40 mg tablet  Self No No   Sig: TAKE 1 TABLET BY MOUTH DAILY AT BEDTIME      Facility-Administered Medications: None       Past Medical History:   Diagnosis Date    Anemia 08/22/2018    Anxiety     Arthritis     AVB (atrioventricular block)     first degree    Cataract     [facility]est[facility] failure) [facility]     COPD, mild ([facility]     Coronary artery disease     Dislocation of right shoulder joint     Frequent UTI     GERD (gastroesophageal reflux disease)     H/O: pneumonia     Heme positive stool     Hyperlipidemia     Hypertension     Hypothyroidism     Morbid obesity with BMI of 50 0-59 9, adult ([facility])     Obesity, morbid ([facility] [address] ) 08/22/2018    JANICE on CPAP     Pulmonary hypertension ([facility]) 08/22/2018    Severe aortic stenosis     Simple goiter     Skin cyst     within the armpits, right    Wears glasses        Past Surgical History:   Procedure Laterality Date    BREAST BIOPSY      CARDIAC CATHETERIZATION      CARPAL TUNNEL RELEASE Bilateral     CHOLECYSTECTOMY      DILATION AND CURETTAGE OF UTERUS      HYSTEROSCOPY      MASTOID SURGERY      WY COLONOSCOPY FLX DX W/COLLJ SPEC WHEN PFRMD N/A 9/6/2018    Procedure: COLONOSCOPY;  Surgeon: Daron Cheatham MD;  Location: [facility]; Service: Gastroenterology    WY ECHO TRANSESOPHAG R-T 2D W/PRB IMG ACQUISJ I&R N/A 10/9/2018    Procedure: INTRA-OP TRANSESOPHAGEAL ECHOCARDIOGRAM (GARRISON); Surgeon: Brigitte Jameson DO;  [facility];  Service: Cardiac Surgery    WY ESOPHAGOGASTRODUODENOSCOPY TRANSORAL DIAGNOSTIC N/A 8/31/2018    Procedure: ESOPHAGOGASTRODUODENOSCOPY (EGD); Surgeon: Daron Cheatham MD;  [facility]: [facility]; Service: Gastroenterology    WY REPLACE AORTIC VALVE OPENFEMORAL ARTERY APPROACH N/A 10/9/2018    Procedure: REPLACEMENT AORTIC VALVE TRANSCATHETER (TAVR) TRANSFEMORAL W/ 23 MM MENDOZA NOE S3 VALVE (ACCESS OF LEFT); Surgeon: Brigitte Jameson DO;  [facility];  Service: Cardiac Surgery    TOTAL HIP ARTHROPLASTY Left 2007    TOTAL KNEE ARTHROPLASTY Bilateral        Family History   Problem Relation Age of Onset    Diabetes Mother     Stroke Mother     Cancer Father     Lung cancer Father     Diabetes Sister     Heart disease Sister     Hypertension Sister     Coronary artery disease Family     Diabetes Family     Hypertension Family     Cancer Family     Stroke Family     Thyroid disease Neg Hx      I have reviewed and agree with the history as documented      E-Cigarette/Vaping    E-Cigarette Use Never User      E-Cigarette/Vaping Substances    Nicotine No     THC No     CBD No     Flavoring No     Other No     Unknown No      Social History     Tobacco Use    Smoking status: Never Smoker    Smokeless tobacco: Never Used   Vaping Use    Vaping Use: Never used   Substance Use Topics    Alcohol use: No    Drug use: No       Review of Systems   Constitutional: Negative for activity change, chills, diaphoresis and fever  HENT: Negative for ear pain and sore throat  Eyes: Negative for pain and visual disturbance  Respiratory: Negative for cough and shortness of breath  Cardiovascular: Positive for syncope  Negative for chest pain and palpitations  Gastrointestinal: Negative for abdominal pain, nausea and vomiting  Genitourinary: Negative for dysuria and hematuria  Musculoskeletal: Negative for arthralgias and back pain  Skin: Negative for color change and rash  Neurological: Negative for focal weakness, seizures, syncope and headaches  Psychiatric/Behavioral: Negative for confusion  All other systems reviewed and are negative  Physical Exam  Physical Exam  Vitals and nursing note reviewed  Constitutional:       General: She is not in acute distress  Appearance: Normal appearance  She is well-developed  She is obese  HENT:      Head: Normocephalic and atraumatic  Mouth/Throat:      Mouth: Mucous membranes are moist       Pharynx: Oropharynx is clear  Eyes:      Conjunctiva/sclera: Conjunctivae normal       Pupils: Pupils are equal, round, and reactive to light  Cardiovascular:      Rate and Rhythm: Regular rhythm  Bradycardia present  Heart sounds: Murmur heard  Comments: There is slight bradycardia at 60    Pulmonary:      Effort: Pulmonary effort is normal  No respiratory distress  Breath sounds: Normal breath sounds  Abdominal:      General: Abdomen is flat  Palpations: Abdomen is soft  Tenderness: There is no abdominal tenderness  Musculoskeletal:      Cervical back: Normal range of motion and neck supple  Skin:     General: Skin is warm and dry  Neurological:      Mental Status: She is alert     Psychiatric:         Mood and Affect: Mood normal          Behavior: Behavior normal          Vital Signs  ED Triage Vitals [06/06/22 1805]   Temp Pulse Resp BP SpO2   -- -- -- -- --      Temp src Heart Rate Source Patient Position - Orthostatic VS BP Location FiO2 (%)   -- -- -- -- --      Pain Score       6           There were no vitals filed for this visit  Visual Acuity      ED Medications  Medications - No data to display    Diagnostic Studies  Results Reviewed     Procedure Component Value Units Date/Time    Hemoglobin and hematocrit, blood [[id number]]     Lab Status: No result Specimen: Blood     Basic metabolic panel [[id number]]     Lab Status: No result Specimen: Blood     HS Troponin 0hr (reflex protocol) [[id number]]     Lab Status: No result Specimen: Blood     UA w Reflex to Microscopic w Reflex to Culture [[id number]]     Lab Status: No result Specimen: Urine, Clean Catch                  No orders to display              Procedures  Procedures         ED Course  ED Course as of 06/06/22 2045 Mon Jun 06, 2022 2037 Labwork normal  Clinically well  Pending ua,  As per RN orthos were negative     2041 UA w Reflex to Microscopic w Reflex to Culture                                             MDM  Number of Diagnoses or Management Options  Diagnosis management comments: Patient presents emergency department with near syncopal event  Without chest pain or trouble breathing  She has normal vital signs  Initial EKG shows primary degree AV block with normal axis and no ST elevations or depressions  Amount and/or Complexity of Data Reviewed  Clinical lab tests: ordered and reviewed  Tests in the radiology section of CPT®: ordered and reviewed        Disposition  Final diagnoses:   None     ED Disposition     None      Follow-up Information    None         Patient's Medications   Discharge Prescriptions    No medications on file       No discharge procedures on file      PDMP Review       Value Time User    PDMP Reviewed  Yes 9/10/2021  2:40 PM Rosemary Farrell [place], [place]          ED Provider  Electronically Signed by Breanna Marino MD  06/06/22 3522

## 2022-06-07 LAB
ATRIAL RATE: 61 BPM
P AXIS: 59 DEGREES
PR INTERVAL: 230 MS
QRS AXIS: -16 DEGREES
QRSD INTERVAL: 80 MS
QT INTERVAL: 420 MS
QTC INTERVAL: 422 MS
T WAVE AXIS: 51 DEGREES
VENTRICULAR RATE: 61 BPM

## 2022-06-07 PROCEDURE — 93010 ELECTROCARDIOGRAM REPORT: CPT | Performed by: INTERNAL MEDICINE

## 2022-06-07 NOTE — ED NOTES
Patient presents for possible fall/syncopal episode while shopping  Patient reports she remembers going down and when she was on the ground  Reports she felt dizzy at that time   Denies dizziness, chest pain at present     Conception JUS Mcintyre  06/06/22 6008

## 2022-06-16 DIAGNOSIS — K21.00 GASTROESOPHAGEAL REFLUX DISEASE WITH ESOPHAGITIS: ICD-10-CM

## 2022-06-16 RX ORDER — OMEPRAZOLE 40 MG/1
CAPSULE, DELAYED RELEASE ORAL
Qty: 180 CAPSULE | Refills: 1 | Status: SHIPPED | OUTPATIENT
Start: 2022-06-16

## 2022-06-20 ENCOUNTER — TELEPHONE (OUTPATIENT)
Dept: FAMILY MEDICINE CLINIC | Facility: CLINIC | Age: 82
End: 2022-06-20

## 2022-06-20 NOTE — TELEPHONE ENCOUNTER
I mis heard her this morning  It is urinating that she can't hold  She just finished the Bactrim  on Saturday

## 2022-06-20 NOTE — TELEPHONE ENCOUNTER
She has had diarrhea for a couple days and can't make it to the bathroom in time  Is there something you can send in to the pharmacy for her?

## 2022-06-21 ENCOUNTER — CLINICAL SUPPORT (OUTPATIENT)
Dept: FAMILY MEDICINE CLINIC | Facility: CLINIC | Age: 82
End: 2022-06-21

## 2022-06-21 VITALS — DIASTOLIC BLOOD PRESSURE: 70 MMHG | SYSTOLIC BLOOD PRESSURE: 110 MMHG

## 2022-06-21 DIAGNOSIS — N30.01 ACUTE CYSTITIS WITH HEMATURIA: ICD-10-CM

## 2022-06-21 DIAGNOSIS — R30.0 DYSURIA: Primary | ICD-10-CM

## 2022-06-21 LAB
SL AMB  POCT GLUCOSE, UA: NORMAL
SL AMB LEUKOCYTE ESTERASE,UA: 500
SL AMB POCT BILIRUBIN,UA: NORMAL
SL AMB POCT BLOOD,UA: 10
SL AMB POCT CLARITY,UA: NORMAL
SL AMB POCT COLOR,UA: YELLOW
SL AMB POCT KETONES,UA: NORMAL
SL AMB POCT NITRITE,UA: NORMAL
SL AMB POCT PH,UA: 6
SL AMB POCT SPECIFIC GRAVITY,UA: 1.01
SL AMB POCT URINE PROTEIN: NORMAL
SL AMB POCT UROBILINOGEN: 3.5

## 2022-06-21 PROCEDURE — 87086 URINE CULTURE/COLONY COUNT: CPT | Performed by: NURSE PRACTITIONER

## 2022-06-21 PROCEDURE — 81002 URINALYSIS NONAUTO W/O SCOPE: CPT | Performed by: NURSE PRACTITIONER

## 2022-06-21 PROCEDURE — 87077 CULTURE AEROBIC IDENTIFY: CPT | Performed by: NURSE PRACTITIONER

## 2022-06-21 PROCEDURE — 87186 SC STD MICRODIL/AGAR DIL: CPT | Performed by: NURSE PRACTITIONER

## 2022-06-21 RX ORDER — CIPROFLOXACIN 500 MG/1
500 TABLET, FILM COATED ORAL EVERY 12 HOURS SCHEDULED
Qty: 10 TABLET | Refills: 0 | Status: SHIPPED | OUTPATIENT
Start: 2022-06-21 | End: 2022-06-26

## 2022-06-22 ENCOUNTER — HOSPITAL ENCOUNTER (OUTPATIENT)
Dept: NON INVASIVE DIAGNOSTICS | Facility: CLINIC | Age: 82
Discharge: HOME | End: 2022-06-22
Payer: MEDICARE

## 2022-06-22 VITALS
HEART RATE: 85 BPM | SYSTOLIC BLOOD PRESSURE: 180 MMHG | HEIGHT: 55 IN | BODY MASS INDEX: 45.82 KG/M2 | WEIGHT: 198 LBS | DIASTOLIC BLOOD PRESSURE: 75 MMHG

## 2022-06-22 DIAGNOSIS — Z95.2 S/P TAVR (TRANSCATHETER AORTIC VALVE REPLACEMENT): ICD-10-CM

## 2022-06-22 LAB
AORTIC ROOT: 3.3 CM
AORTIC VALVE MEAN VELOCITY: 19.6 M/S
APICAL FOUR CHAMBER EJECTION FRACTION: 69 %
AV AREA BY CONTINUOUS VTI: 1.9 CM2
AV AREA PEAK VELOCITY: 1.5 CM2
AV LVOT MEAN GRADIENT: 5 MMHG
AV LVOT PEAK GRADIENT: 7 MMHG
AV MEAN GRADIENT: 17 MMHG
AV PEAK GRADIENT: 30 MMHG
AV VALVE AREA: 1.88 CM2
AV VELOCITY RATIO: 0.47
DOP CALC AO PEAK VEL: 2.74 M/S
DOP CALC AO VTI: 59.52 CM
DOP CALC LVOT AREA: 3.14 CM2
DOP CALC LVOT DIAMETER: 2 CM
DOP CALC LVOT PEAK VEL VTI: 35.72 CM
DOP CALC LVOT PEAK VEL: 1.28 M/S
DOP CALC LVOT STROKE INDEX: 62.3 ML/M2
DOP CALC LVOT STROKE VOLUME: 112.16 CM3
E WAVE DECELERATION TIME: 418 MS
FRACTIONAL SHORTENING: 34 % (ref 28–44)
INTERVENTRICULAR SEPTUM IN DIASTOLE (PARASTERNAL SHORT AXIS VIEW): 1.3 CM
INTERVENTRICULAR SEPTUM: 1.3 CM (ref 0.6–1.1)
LAAS-AP2: 20.7 CM2
LAAS-AP4: 18.2 CM2
LEFT ATRIUM AREA SYSTOLE SINGLE PLANE A4C: 18.1 CM2
LEFT ATRIUM SIZE: 4.1 CM
LEFT INTERNAL DIMENSION IN SYSTOLE: 2.3 CM (ref 2.1–4)
LEFT VENTRICULAR INTERNAL DIMENSION IN DIASTOLE: 3.5 CM (ref 3.5–6)
LEFT VENTRICULAR POSTERIOR WALL IN END DIASTOLE: 1.3 CM
LEFT VENTRICULAR STROKE VOLUME: 34 ML
LVSV (TEICH): 34 ML
MV E'TISSUE VEL-LAT: 6 CM/S
MV PEAK A VEL: 2.15 M/S
MV PEAK E VEL: 182 CM/S
MV STENOSIS PRESSURE HALF TIME: 121 MS
MV VALVE AREA P 1/2 METHOD: 1.82 CM2
RIGHT ATRIUM AREA SYSTOLE A4C: 11.1 CM2
RIGHT VENTRICLE ID DIMENSION: 2.9 CM
SL CV LEFT ATRIUM LENGTH A2C: 5.7 CM
SL CV PED ECHO LEFT VENTRICLE DIASTOLIC VOLUME (MOD BIPLANE) 2D: 51 ML
SL CV PED ECHO LEFT VENTRICLE SYSTOLIC VOLUME (MOD BIPLANE) 2D: 17 ML
TR MAX PG: 52 MMHG
TR PEAK VELOCITY: 3.6 M/S
TRICUSPID VALVE PEAK REGURGITATION VELOCITY: 3.61 M/S

## 2022-06-22 PROCEDURE — 93306 TTE W/DOPPLER COMPLETE: CPT

## 2022-06-22 PROCEDURE — 93306 TTE W/DOPPLER COMPLETE: CPT | Performed by: INTERNAL MEDICINE

## 2022-06-23 LAB — BACTERIA UR CULT: ABNORMAL

## 2022-06-27 RX ORDER — FUROSEMIDE 20 MG/1
20 TABLET ORAL DAILY
Qty: 30 TABLET | Refills: 0 | Status: SHIPPED | OUTPATIENT
Start: 2022-06-27 | End: 2022-07-20

## 2022-06-27 NOTE — TELEPHONE ENCOUNTER
Spoke with patient  She states she has been noticing swelling in her legs for the past few days and has been taking Lasix and denies any weight gain  Patient is requesting a refill of Lasix 20 mg

## 2022-06-27 NOTE — TELEPHONE ENCOUNTER
PT called advising she is retaining water in her ankles  Inquired if she should take her mediation (Lasix)  Please call to advise

## 2022-07-10 DIAGNOSIS — F41.8 DEPRESSION WITH ANXIETY: ICD-10-CM

## 2022-07-10 DIAGNOSIS — G89.29 CHRONIC RIGHT SHOULDER PAIN: ICD-10-CM

## 2022-07-10 DIAGNOSIS — M25.511 CHRONIC RIGHT SHOULDER PAIN: ICD-10-CM

## 2022-07-10 RX ORDER — MELOXICAM 15 MG/1
15 TABLET ORAL DAILY
Qty: 30 TABLET | Refills: 0 | Status: SHIPPED | OUTPATIENT
Start: 2022-07-10 | End: 2022-08-17

## 2022-07-10 RX ORDER — SERTRALINE HYDROCHLORIDE 100 MG/1
TABLET, FILM COATED ORAL
Qty: 90 TABLET | Refills: 0 | Status: SHIPPED | OUTPATIENT
Start: 2022-07-10

## 2022-07-21 ENCOUNTER — TELEPHONE (OUTPATIENT)
Dept: FAMILY MEDICINE CLINIC | Facility: CLINIC | Age: 82
End: 2022-07-21

## 2022-07-21 NOTE — TELEPHONE ENCOUNTER
Patient is seeing a pain management doctor today and they had her sign an opioid agreement form and she wants to know if she is allowed to take opioid medications?

## 2022-07-25 DIAGNOSIS — Z95.2 S/P TAVR (TRANSCATHETER AORTIC VALVE REPLACEMENT): ICD-10-CM

## 2022-07-25 DIAGNOSIS — E03.9 ACQUIRED HYPOTHYROIDISM: ICD-10-CM

## 2022-07-25 DIAGNOSIS — I35.0 AORTIC STENOSIS, SEVERE: ICD-10-CM

## 2022-07-25 RX ORDER — SIMVASTATIN 40 MG
40 TABLET ORAL
Qty: 90 TABLET | Refills: 1 | OUTPATIENT
Start: 2022-07-25

## 2022-07-25 RX ORDER — LEVOTHYROXINE SODIUM 0.05 MG/1
50 TABLET ORAL DAILY
Qty: 30 TABLET | Refills: 0 | Status: SHIPPED | OUTPATIENT
Start: 2022-07-25 | End: 2022-10-20

## 2022-07-25 RX ORDER — ATORVASTATIN CALCIUM 20 MG/1
20 TABLET, FILM COATED ORAL DAILY
Qty: 90 TABLET | Refills: 0 | Status: SHIPPED | OUTPATIENT
Start: 2022-07-25 | End: 2022-08-03

## 2022-07-26 RX ORDER — LEVOTHYROXINE SODIUM 0.05 MG/1
TABLET ORAL
Qty: 90 TABLET | Refills: 1 | Status: SHIPPED | OUTPATIENT
Start: 2022-07-26 | End: 2022-09-15

## 2022-07-28 DIAGNOSIS — J44.9 CHRONIC OBSTRUCTIVE PULMONARY DISEASE, UNSPECIFIED COPD TYPE (HCC): ICD-10-CM

## 2022-07-28 DIAGNOSIS — J96.11 CHRONIC HYPOXEMIC RESPIRATORY FAILURE (HCC): ICD-10-CM

## 2022-07-29 RX ORDER — BUDESONIDE AND FORMOTEROL FUMARATE DIHYDRATE 160; 4.5 UG/1; UG/1
2 AEROSOL RESPIRATORY (INHALATION) 2 TIMES DAILY
Qty: 10.2 G | Refills: 3 | Status: SHIPPED | OUTPATIENT
Start: 2022-07-29

## 2022-08-01 ENCOUNTER — APPOINTMENT (OUTPATIENT)
Dept: LAB | Facility: HOSPITAL | Age: 82
End: 2022-08-01
Payer: MEDICARE

## 2022-08-01 DIAGNOSIS — Z00.00 HEALTHCARE MAINTENANCE: ICD-10-CM

## 2022-08-01 DIAGNOSIS — E83.52 SERUM CALCIUM ELEVATED: Primary | ICD-10-CM

## 2022-08-01 LAB
ALBUMIN SERPL BCP-MCNC: 3.3 G/DL (ref 3.5–5)
ALP SERPL-CCNC: 87 U/L (ref 46–116)
ALT SERPL W P-5'-P-CCNC: 24 U/L (ref 12–78)
ANION GAP SERPL CALCULATED.3IONS-SCNC: 9 MMOL/L (ref 4–13)
AST SERPL W P-5'-P-CCNC: 26 U/L (ref 5–45)
BACTERIA UR QL AUTO: ABNORMAL /HPF
BASOPHILS # BLD AUTO: 0.05 THOUSANDS/ΜL (ref 0–0.1)
BASOPHILS NFR BLD AUTO: 1 % (ref 0–1)
BILIRUB SERPL-MCNC: 0.34 MG/DL (ref 0.2–1)
BILIRUB UR QL STRIP: NEGATIVE
BUN SERPL-MCNC: 14 MG/DL (ref 5–25)
CALCIUM ALBUM COR SERPL-MCNC: 10.6 MG/DL (ref 8.3–10.1)
CALCIUM SERPL-MCNC: 10 MG/DL (ref 8.3–10.1)
CHLORIDE SERPL-SCNC: 104 MMOL/L (ref 96–108)
CHOLEST SERPL-MCNC: 125 MG/DL
CLARITY UR: CLEAR
CO2 SERPL-SCNC: 27 MMOL/L (ref 21–32)
COLOR UR: YELLOW
CREAT SERPL-MCNC: 0.78 MG/DL (ref 0.6–1.3)
CREAT UR-MCNC: 19.5 MG/DL
EOSINOPHIL # BLD AUTO: 0.55 THOUSAND/ΜL (ref 0–0.61)
EOSINOPHIL NFR BLD AUTO: 5 % (ref 0–6)
ERYTHROCYTE [DISTWIDTH] IN BLOOD BY AUTOMATED COUNT: 17.4 % (ref 11.6–15.1)
GFR SERPL CREATININE-BSD FRML MDRD: 71 ML/MIN/1.73SQ M
GLUCOSE P FAST SERPL-MCNC: 90 MG/DL (ref 65–99)
GLUCOSE UR STRIP-MCNC: NEGATIVE MG/DL
HCT VFR BLD AUTO: 37 % (ref 34.8–46.1)
HDLC SERPL-MCNC: 60 MG/DL
HGB BLD-MCNC: 11.7 G/DL (ref 11.5–15.4)
HGB UR QL STRIP.AUTO: NEGATIVE
IMM GRANULOCYTES # BLD AUTO: 0.12 THOUSAND/UL (ref 0–0.2)
IMM GRANULOCYTES NFR BLD AUTO: 1 % (ref 0–2)
KETONES UR STRIP-MCNC: NEGATIVE MG/DL
LDLC SERPL CALC-MCNC: 44 MG/DL (ref 0–100)
LEUKOCYTE ESTERASE UR QL STRIP: ABNORMAL
LYMPHOCYTES # BLD AUTO: 1.83 THOUSANDS/ΜL (ref 0.6–4.47)
LYMPHOCYTES NFR BLD AUTO: 17 % (ref 14–44)
MCH RBC QN AUTO: 28.5 PG (ref 26.8–34.3)
MCHC RBC AUTO-ENTMCNC: 31.6 G/DL (ref 31.4–37.4)
MCV RBC AUTO: 90 FL (ref 82–98)
MICROALBUMIN UR-MCNC: 12.7 MG/L (ref 0–20)
MICROALBUMIN/CREAT 24H UR: 65 MG/G CREATININE (ref 0–30)
MONOCYTES # BLD AUTO: 0.81 THOUSAND/ΜL (ref 0.17–1.22)
MONOCYTES NFR BLD AUTO: 8 % (ref 4–12)
NEUTROPHILS # BLD AUTO: 7.32 THOUSANDS/ΜL (ref 1.85–7.62)
NEUTS SEG NFR BLD AUTO: 68 % (ref 43–75)
NITRITE UR QL STRIP: NEGATIVE
NON-SQ EPI CELLS URNS QL MICRO: ABNORMAL /HPF
NONHDLC SERPL-MCNC: 65 MG/DL
NRBC BLD AUTO-RTO: 0 /100 WBCS
PH UR STRIP.AUTO: 6 [PH]
PLATELET # BLD AUTO: 355 THOUSANDS/UL (ref 149–390)
PMV BLD AUTO: 9.5 FL (ref 8.9–12.7)
POTASSIUM SERPL-SCNC: 4.7 MMOL/L (ref 3.5–5.3)
PROT SERPL-MCNC: 7.5 G/DL (ref 6.4–8.4)
PROT UR STRIP-MCNC: NEGATIVE MG/DL
RBC # BLD AUTO: 4.1 MILLION/UL (ref 3.81–5.12)
RBC #/AREA URNS AUTO: ABNORMAL /HPF
SODIUM SERPL-SCNC: 140 MMOL/L (ref 135–147)
SP GR UR STRIP.AUTO: 1.01 (ref 1–1.03)
TRIGL SERPL-MCNC: 105 MG/DL
TSH SERPL DL<=0.05 MIU/L-ACNC: 2.01 UIU/ML (ref 0.45–4.5)
UROBILINOGEN UR QL STRIP.AUTO: 0.2 E.U./DL
WBC # BLD AUTO: 10.68 THOUSAND/UL (ref 4.31–10.16)
WBC #/AREA URNS AUTO: ABNORMAL /HPF

## 2022-08-01 PROCEDURE — 82570 ASSAY OF URINE CREATININE: CPT

## 2022-08-01 PROCEDURE — 80061 LIPID PANEL: CPT

## 2022-08-01 PROCEDURE — 85025 COMPLETE CBC W/AUTO DIFF WBC: CPT

## 2022-08-01 PROCEDURE — 82043 UR ALBUMIN QUANTITATIVE: CPT

## 2022-08-01 PROCEDURE — 80053 COMPREHEN METABOLIC PANEL: CPT

## 2022-08-01 PROCEDURE — 84443 ASSAY THYROID STIM HORMONE: CPT

## 2022-08-01 PROCEDURE — 81001 URINALYSIS AUTO W/SCOPE: CPT

## 2022-08-01 PROCEDURE — 36415 COLL VENOUS BLD VENIPUNCTURE: CPT

## 2022-08-02 ENCOUNTER — TELEPHONE (OUTPATIENT)
Dept: FAMILY MEDICINE CLINIC | Facility: CLINIC | Age: 82
End: 2022-08-02

## 2022-08-02 NOTE — TELEPHONE ENCOUNTER
Patient wants to know why her Simvastatin was changed to Atorvastatin? Could you help me with this? Patient is aware you are out of the office

## 2022-08-03 DIAGNOSIS — E78.5 HYPERLIPIDEMIA, UNSPECIFIED HYPERLIPIDEMIA TYPE: Primary | ICD-10-CM

## 2022-08-03 RX ORDER — SIMVASTATIN 40 MG
40 TABLET ORAL
Qty: 90 TABLET | Refills: 1 | Status: SHIPPED | OUTPATIENT
Start: 2022-08-03 | End: 2022-11-09

## 2022-08-03 NOTE — PROGRESS NOTES
Pain Medicine Follow-Up Note    Assessment:  1  Chronic right shoulder pain    2  Lumbar radiculopathy    3  Chronic bilateral low back pain with left-sided sciatica    4  Post-traumatic osteoarthritis of right shoulder        Plan:  Orders Placed This Encounter   Procedures    FL spine and pain procedure     Standing Status:   Future     Standing Expiration Date:   8/5/2026     Order Specific Question:   Reason for Exam:     Answer:   right intraarticular shoulder injection under fluoro     Order Specific Question:   Anticoagulant hold needed? Answer:   no       New Medications Ordered This Visit   Medications    amoxicillin (AMOXIL) 500 mg capsule     Sig: TAKE 4 CAPSULES (2,000 MG TOTAL) BY MOUTH EVERY 8 (EIGHT) HOURS FOR 1 DAY    oxybutynin (DITROPAN-XL) 10 MG 24 hr tablet     Sig: Take 10 mg by mouth daily    Diclofenac Sodium (VOLTAREN) 1 %     Sig: Apply 2 g topically 4 (four) times a day     Dispense:  50 g     Refill:  0       My impressions and treatment recommendations were discussed in detail with the patient who verbalized understanding and had no further questions  This is an 48-year-old female who returns to our office with new complaint of chronic right shoulder pain  She is had subacromial bursa injection and glenohumeral joint injection, with at least some degree of relief with glenohumeral joint injection  We discussed repeating the procedure under fluoroscopic guidance to try to obtain additional relief  Furthermore, will order Voltaren gel to apply over the shoulder 4 times a day  If no significant relief with this procedure, then would have her follow-up in the office to discuss potential medication options  Patient would certainly be candidate for low-dose opioid therapy in the form of tramadol  However we would like to try these more conservative measures 1st   Patient also is too high risk for total shoulder arthroplasty      [place] Prescription Drug Monitoring Program report was reviewed and was appropriate     Complete risks and benefits including bleeding, infection, tissue reaction, nerve injury and allergic reaction were discussed  The approach was demonstrated using models and literature was provided  Verbal and written consent was obtained  Discharge instructions were provided  I personally saw and examined the patient and I agree with the above discussed plan of care  History of Present Illness:    Frieda Becker is a 79 y o  female who presents to [facility] for interval re-evaluation of the above stated pain complaints  The patient has a past medical and chronic pain history as outlined in the assessment section  She was last seen on 08/17/2021 for lumbar epidural steroid injection  She is here for different complaint of right shoulder pain secondary to glenohumeral arthritis  Pain score 8/10  Worse in the morning, evening, and nighttime  Pain is constant and sharp in nature       Since last visit, she had right subacromial bursa injection on 05/31/2022 with minimal relief, followed by right intra-articular shoulder injection on 06/14/2022 with moderate relief  This was done through an outside orthopedic office      Other than as stated above, the patient denies any interval changes in medications, medical condition, mental condition, symptoms, or allergies since the last office visit  Review of Systems:    Review of Systems   Respiratory: Negative for shortness of breath  Cardiovascular: Negative for chest pain  Gastrointestinal: Negative for constipation, diarrhea, nausea and vomiting  Musculoskeletal: Positive for gait problem and myalgias  Negative for arthralgias and joint swelling  Decreased ROM   Skin: Negative for rash  Neurological: Negative for dizziness, seizures and weakness  All other systems reviewed and are negative          Patient Active Problem List   Diagnosis    Hypothyroid    Hypertension    Hyperlipidemia    Reactive airway disease without complication    JANICE (obstructive sleep apnea)    LVH (left ventricular hypertrophy)    Mitral annular calcification    Mitral valve stenosis    Pulmonary hypertension (HCC)    Chronic diastolic (congestive) heart failure (HCC)    Anemia    Obesity, morbid (HCC)    Gastroesophageal reflux disease without esophagitis    Arthritis    AVB (atrioventricular block)    COPD, mild (HCC)    Coronary artery disease    JANICE on CPAP    S/P TAVR (transcatheter aortic valve replacement)    Depression with anxiety    Chronic otitis media    Insomnia    Osteopenia    Depression, recurrent (HCC)    Radiculopathy, lumbar region    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 [address]    Stage 3a chronic kidney disease ([facility] [address] )       Past Medical History:   Diagnosis Date    Anemia 08/22/2018    Anxiety     Arthritis     AVB (atrioventricular block)     first degree    Cataract     CHF (congestive heart failure) (HCC)     COPD, mild (HCC)     Coronary artery disease     Dislocation of right shoulder joint     Frequent UTI     GERD (gastroesophageal reflux disease)     H/O: pneumonia     Heme positive stool     Hyperlipidemia     Hypertension     Hypothyroidism     Morbid obesity with BMI of 50 0-59 9, adult (HCC)     Obesity, morbid ([facility] [address] ) 08/22/2018    JANICE on CPAP     Pulmonary hypertension ([facility] [address] ) 08/22/2018    Severe aortic stenosis     Simple goiter     Skin cyst     within the armpits, right    Wears glasses        Past Surgical History:   Procedure Laterality Date    BREAST BIOPSY      CARDIAC CATHETERIZATION      CARPAL TUNNEL RELEASE Bilateral     CHOLECYSTECTOMY      DILATION AND CURETTAGE OF UTERUS      HYSTEROSCOPY      MASTOID SURGERY      AL COLONOSCOPY FLX DX W/COLLJ SPEC WHEN PFRMD N/A 9/6/2018    Procedure: COLONOSCOPY;  Surgeon: Gabriella Haider MD;  Location: [facility];   Service: Gastroenterology    AL ECHO TRANSESOPHAG R-T 2D W/PRB IMG ACQUISJ I&R N/A 10/9/2018    Procedure: INTRA-OP TRANSESOPHAGEAL ECHOCARDIOGRAM (GARRISON); Surgeon: Chuck Guido DO;  [facility];  Service: Cardiac Surgery    LA ESOPHAGOGASTRODUODENOSCOPY TRANSORAL DIAGNOSTIC N/A 8/31/2018    Procedure: ESOPHAGOGASTRODUODENOSCOPY (EGD); Surgeon: Barnie Gosselin, MD;  Location: [facility]; Service: Gastroenterology    LA REPLACE AORTIC VALVE OPENFEMORAL ARTERY APPROACH N/A 10/9/2018    Procedure: REPLACEMENT AORTIC VALVE TRANSCATHETER (TAVR) TRANSFEMORAL W/ 23 MM MENDOZA NOE S3 VALVE (ACCESS OF LEFT);   Surgeon: Chuck Guido DO;  [facility];  Service: Cardiac Surgery    TOTAL HIP ARTHROPLASTY Left 2007    TOTAL KNEE ARTHROPLASTY Bilateral        Family History   Problem Relation Age of Onset    Diabetes Mother     Stroke Mother     Cancer Father     Lung cancer Father     Diabetes Sister     Heart disease Sister     Hypertension Sister     Coronary artery disease Family     Diabetes Family     Hypertension Family     Cancer Family     Stroke Family     Thyroid disease Neg Hx        Social History     Occupational History    Occupation: retired   Tobacco Use    Smoking status: Never Smoker    Smokeless tobacco: Never Used   Vaping Use    Vaping Use: Never used   Substance and Sexual Activity    Alcohol use: No    Drug use: No    Sexual activity: Never         Current Outpatient Medications:     albuterol (2 5 mg/3 mL) 0 083 % nebulizer solution, Take 3 mL (2 5 mg total) by nebulization every 6 (six) hours as needed for wheezing or shortness of breath, Disp: 360 mL, Rfl: 5    albuterol (PROVENTIL HFA,VENTOLIN HFA) 90 mcg/act inhaler, Inhale 2 puffs every 6 (six) hours as needed for wheezing, Disp: 1 Inhaler, Rfl: 3    alendronate (FOSAMAX) 70 mg tablet, Take 1 tablet (70 mg total) by mouth every 7 days, Disp: 12 tablet, Rfl: 1    amoxicillin (AMOXIL) 500 mg capsule, TAKE 4 CAPSULES (2,000 MG TOTAL) BY MOUTH EVERY 8 (EIGHT) HOURS FOR 1 DAY, Disp: , Rfl:     aspirin (ECOTRIN LOW STRENGTH) 81 mg EC tablet, Take 1 tablet (81 mg total) by mouth daily, Disp: 100 tablet, Rfl: 0    b complex vitamins capsule, Take 1 capsule by mouth 2 (two) times a day  , Disp: , Rfl:     budesonide-formoterol (Symbicort) 160-4 5 mcg/act inhaler, Inhale 2 puffs 2 (two) times a day, Disp: 10 2 g, Rfl: 3    Calcium Carb-Cholecalciferol (CALCIUM 600 + D PO), Take 1 tablet by mouth 2 (two) times a day, Disp: , Rfl:     Cranberry 250 MG TABS, Take by mouth, Disp: , Rfl:     Diclofenac Sodium (VOLTAREN) 1 %, Apply 2 g topically 4 (four) times a day, Disp: 50 g, Rfl: 0    Fesoterodine Fumarate ER (Toviaz) 8 MG TB24, Take 8 mg by mouth daily , Disp: , Rfl:     furosemide (LASIX) 20 mg tablet, TAKE 1 TABLET (20 MG TOTAL) BY MOUTH DAILY IN THE MORNING, Disp: 90 tablet, Rfl: 3    levothyroxine 50 mcg tablet, Take 1 tablet (50 mcg total) by mouth daily, Disp: 30 tablet, Rfl: 0    levothyroxine 50 mcg tablet, TAKE 1 TABLET BY MOUTH EVERY DAY, Disp: 90 tablet, Rfl: 1    meloxicam (MOBIC) 15 mg tablet, TAKE 1 TABLET (15 MG TOTAL) BY MOUTH DAILY  , Disp: 30 tablet, Rfl: 0    mometasone (ELOCON) 0 1 % cream, APPLY TO THE RIGHT EAR CANAL TWICE DAILY FOR 2 WEEKS, THEN DECREASE TO ONCE AT BEDTIME OR AS NEEDED, Disp: 45 g, Rfl: 0    olmesartan (BENICAR) 5 mg tablet, TAKE 2 TABLETS BY MOUTH EVERY DAY, Disp: 180 tablet, Rfl: 3    omeprazole (PriLOSEC) 40 MG capsule, TAKE 1 CAPSULE BY MOUTH TWICE A DAY, Disp: 180 capsule, Rfl: 1    oxybutynin (DITROPAN-XL) 10 MG 24 hr tablet, Take 10 mg by mouth daily, Disp: , Rfl:     sertraline (ZOLOFT) 100 mg tablet, TAKE 1 TABLET BY MOUTH EVERY DAY, Disp: 90 tablet, Rfl: 0    simvastatin (ZOCOR) 40 mg tablet, Take 1 tablet (40 mg total) by mouth daily at bedtime, Disp: 90 tablet, Rfl: 1    Allergies   Allergen Reactions    Latex Rash    Neosporin [Neomycin-Bacitracin Zn-Polymyx] Rash and Other (See Comments)     hives per [facility] order       Physical Exam:    /66 (BP Location: Left arm, Patient Position: Sitting, Cuff Size: Standard)   Pulse 68   Ht 4' 7" (1 397 m)   Wt 88 1 kg (194 lb 3 2 oz)   BMI 45 14 kg/m²     Constitutional:normal, well developed, well nourished, alert, in no distress and non-toxic and no overt pain behavior    Eyes:anicteric  HEENT:grossly intact  Neck:supple, symmetric, trachea midline and no masses   Pulmonary:even and unlabored  Cardiovascular:No edema or pitting edema present  Skin:Normal without rashes or lesions and well hydrated  Psychiatric:Mood and affect appropriate  Neurologic:Cranial Nerves II-XII grossly intact  Musculoskeletal:normal      Imaging  FL spine and pain procedure    (Results Pending)         Orders Placed This Encounter   Procedures    [place] spine and pain procedure

## 2022-08-05 ENCOUNTER — OFFICE VISIT (OUTPATIENT)
Dept: PAIN MEDICINE | Facility: CLINIC | Age: 82
End: 2022-08-05
Payer: MEDICARE

## 2022-08-05 VITALS
WEIGHT: 194.2 LBS | HEIGHT: 55 IN | BODY MASS INDEX: 44.94 KG/M2 | HEART RATE: 68 BPM | SYSTOLIC BLOOD PRESSURE: 139 MMHG | DIASTOLIC BLOOD PRESSURE: 66 MMHG

## 2022-08-05 DIAGNOSIS — M54.16 LUMBAR RADICULOPATHY: ICD-10-CM

## 2022-08-05 DIAGNOSIS — M25.511 CHRONIC RIGHT SHOULDER PAIN: Primary | ICD-10-CM

## 2022-08-05 DIAGNOSIS — M54.42 CHRONIC BILATERAL LOW BACK PAIN WITH LEFT-SIDED SCIATICA: ICD-10-CM

## 2022-08-05 DIAGNOSIS — G89.29 CHRONIC BILATERAL LOW BACK PAIN WITH LEFT-SIDED SCIATICA: ICD-10-CM

## 2022-08-05 DIAGNOSIS — M19.111 POST-TRAUMATIC OSTEOARTHRITIS OF RIGHT SHOULDER: ICD-10-CM

## 2022-08-05 DIAGNOSIS — G89.29 CHRONIC RIGHT SHOULDER PAIN: Primary | ICD-10-CM

## 2022-08-05 PROCEDURE — 99214 OFFICE O/P EST MOD 30 MIN: CPT | Performed by: STUDENT IN AN ORGANIZED HEALTH CARE EDUCATION/TRAINING PROGRAM

## 2022-08-05 RX ORDER — OXYBUTYNIN CHLORIDE 10 MG/1
10 TABLET, EXTENDED RELEASE ORAL DAILY
COMMUNITY
Start: 2022-06-28

## 2022-08-05 RX ORDER — AMOXICILLIN 500 MG/1
CAPSULE ORAL
COMMUNITY
Start: 2022-05-11 | End: 2022-09-15

## 2022-08-16 ENCOUNTER — HOSPITAL ENCOUNTER (OUTPATIENT)
Dept: RADIOLOGY | Facility: CLINIC | Age: 82
Discharge: HOME | End: 2022-08-16
Admitting: STUDENT IN AN ORGANIZED HEALTH CARE EDUCATION/TRAINING PROGRAM
Payer: MEDICARE

## 2022-08-16 ENCOUNTER — TELEPHONE (OUTPATIENT)
Dept: PULMONOLOGY | Facility: CLINIC | Age: 82
End: 2022-08-16

## 2022-08-16 VITALS
HEART RATE: 60 BPM | RESPIRATION RATE: 20 BRPM | OXYGEN SATURATION: 94 % | SYSTOLIC BLOOD PRESSURE: 144 MMHG | TEMPERATURE: 97.8 F | DIASTOLIC BLOOD PRESSURE: 77 MMHG

## 2022-08-16 DIAGNOSIS — M19.111 POST-TRAUMATIC OSTEOARTHRITIS OF RIGHT SHOULDER: ICD-10-CM

## 2022-08-16 PROCEDURE — 20610 DRAIN/INJ JOINT/BURSA W/O US: CPT | Performed by: STUDENT IN AN ORGANIZED HEALTH CARE EDUCATION/TRAINING PROGRAM

## 2022-08-16 PROCEDURE — 77002 NEEDLE LOCALIZATION BY XRAY: CPT | Performed by: STUDENT IN AN ORGANIZED HEALTH CARE EDUCATION/TRAINING PROGRAM

## 2022-08-16 PROCEDURE — 77002 NEEDLE LOCALIZATION BY XRAY: CPT

## 2022-08-16 RX ORDER — BUPIVACAINE HCL/PF 2.5 MG/ML
4 VIAL (ML) INJECTION ONCE
Status: COMPLETED | OUTPATIENT
Start: 2022-08-16 | End: 2022-08-16

## 2022-08-16 RX ORDER — METHYLPREDNISOLONE ACETATE 40 MG/ML
40 INJECTION, SUSPENSION INTRA-ARTICULAR; INTRALESIONAL; INTRAMUSCULAR; PARENTERAL; SOFT TISSUE ONCE
Status: COMPLETED | OUTPATIENT
Start: 2022-08-16 | End: 2022-08-16

## 2022-08-16 RX ADMIN — METHYLPREDNISOLONE ACETATE 40 MG: 40 INJECTION, SUSPENSION INTRA-ARTICULAR; INTRALESIONAL; INTRAMUSCULAR; PARENTERAL; SOFT TISSUE at 09:49

## 2022-08-16 RX ADMIN — IOHEXOL 1 ML: 300 INJECTION, SOLUTION INTRAVENOUS at 09:49

## 2022-08-16 RX ADMIN — Medication 4 ML: at 09:49

## 2022-08-16 NOTE — DISCHARGE INSTR - LAB
Steroid Joint Injection   WHAT YOU NEED TO KNOW:   A steroid joint injection is a procedure to inject steroid medicine into a joint  Steroid medicine decreases pain and inflammation  The injection may also contain an anesthetic (numbing medicine) to decrease pain  It may be done to treat conditions such as arthritis, gout, or carpal tunnel syndrome  The injections may be given in your knee, ankle, shoulder, elbow, wrist, ankle or sacroiliac joint  Do not apply heat to any area that is numb  If you have discomfort or soreness at the injection site, you may apply ice today, 20 minutes on and 20 minutes off  Tomorrow you may use ice or warm, moist heat  Do not apply ice or heat directly to the skin  You may have an increase or change in the discomfort for 36-48 hours after your treatment  Apply ice and continue with any pain medicine you have been prescribed  Do not do anything strenuous today  You may shower, but no tub baths or hot tubs today  You may resume your normal activities tomorrow, but do not overdo it  Resume normal activities slowly when you are feeling better  If you experience redness, drainage or swelling at the injection site, or if you develop a fever above 100 degrees, please call [facility] at [phone] or go to the Emergency Room  Continue to take all routine medicines prescribed by your primary care physician unless otherwise instructed by our staff  Most blood thinners should be started again according to your regularly scheduled dosing  If you have any questions, please give our office a call  As no general anesthesia was used in today's procedure, you should not experience any side effects related to anesthesia  If you are diabetic, the steroids used in today's injection may temporarily increase your blood sugar levels after the first few days after your injection   Please keep a close eye on your sugars and alert the doctor who manages your diabetes if your sugars are significantly high from your baseline or you are symptomatic  If you have a problem specifically related to your procedure, please call our office at [phone]  Problems not related to your procedure should be directed to your primary care physician

## 2022-08-17 DIAGNOSIS — M25.511 CHRONIC RIGHT SHOULDER PAIN: ICD-10-CM

## 2022-08-17 DIAGNOSIS — G89.29 CHRONIC RIGHT SHOULDER PAIN: ICD-10-CM

## 2022-08-17 RX ORDER — MELOXICAM 15 MG/1
15 TABLET ORAL DAILY
Qty: 30 TABLET | Refills: 0 | Status: SHIPPED | OUTPATIENT
Start: 2022-08-17 | End: 2022-08-30

## 2022-08-23 ENCOUNTER — TELEPHONE (OUTPATIENT)
Dept: PAIN MEDICINE | Facility: CLINIC | Age: 82
End: 2022-08-23

## 2022-08-26 DIAGNOSIS — M85.80 OSTEOPENIA, UNSPECIFIED LOCATION: ICD-10-CM

## 2022-08-26 RX ORDER — ALENDRONATE SODIUM 70 MG/1
70 TABLET ORAL
Qty: 12 TABLET | Refills: 1 | Status: SHIPPED | OUTPATIENT
Start: 2022-08-26 | End: 2022-10-25

## 2022-08-27 DIAGNOSIS — I10 ESSENTIAL HYPERTENSION: ICD-10-CM

## 2022-08-29 RX ORDER — OLMESARTAN MEDOXOMIL 5 MG/1
TABLET ORAL
Qty: 90 TABLET | Refills: 3 | Status: SHIPPED | OUTPATIENT
Start: 2022-08-29

## 2022-08-30 ENCOUNTER — OFFICE VISIT (OUTPATIENT)
Dept: FAMILY MEDICINE CLINIC | Facility: CLINIC | Age: 82
End: 2022-08-30
Payer: MEDICARE

## 2022-08-30 ENCOUNTER — TELEPHONE (OUTPATIENT)
Dept: FAMILY MEDICINE CLINIC | Facility: CLINIC | Age: 82
End: 2022-08-30

## 2022-08-30 VITALS
HEART RATE: 77 BPM | WEIGHT: 190 LBS | DIASTOLIC BLOOD PRESSURE: 68 MMHG | OXYGEN SATURATION: 92 % | SYSTOLIC BLOOD PRESSURE: 110 MMHG | BODY MASS INDEX: 43.97 KG/M2 | HEIGHT: 55 IN

## 2022-08-30 DIAGNOSIS — M54.32 LEFT SCIATIC NERVE PAIN: Primary | ICD-10-CM

## 2022-08-30 DIAGNOSIS — M54.50 ACUTE MIDLINE LOW BACK PAIN WITHOUT SCIATICA: Primary | ICD-10-CM

## 2022-08-30 PROCEDURE — 99213 OFFICE O/P EST LOW 20 MIN: CPT | Performed by: NURSE PRACTITIONER

## 2022-08-30 RX ORDER — CYCLOBENZAPRINE HCL 5 MG
5 TABLET ORAL 2 TIMES DAILY PRN
Qty: 30 TABLET | Refills: 0 | OUTPATIENT
Start: 2022-08-30 | End: 2022-09-05

## 2022-08-30 RX ORDER — METHOCARBAMOL 500 MG/1
500 TABLET, FILM COATED ORAL 3 TIMES DAILY PRN
Qty: 30 TABLET | Refills: 0 | Status: SHIPPED | OUTPATIENT
Start: 2022-08-30 | End: 2022-08-30

## 2022-08-30 RX ORDER — MELOXICAM 15 MG/1
15 TABLET ORAL DAILY
Qty: 7 TABLET | Refills: 0 | OUTPATIENT
Start: 2022-08-30 | End: 2022-09-05

## 2022-08-30 NOTE — PROGRESS NOTES
Assessment/Plan:     Chronic Problems:  No problem-specific Assessment & Plan notes found for this encounter  Visit Diagnosis:  Diagnoses and all orders for this visit:    Left sciatic nerve pain  Comments: Will treat with 1 week of Meloxicam, daily with food  Advised to stop ibuprofen while on meloxicam  Flexeril as needed for pain  Heating pad, muscle rubs  Orders:  -     meloxicam (Mobic) 15 mg tablet; Take 1 tablet (15 mg total) by mouth daily  -     cyclobenzaprine (FLEXERIL) 5 mg tablet; Take 1 tablet (5 mg total) by mouth 2 (two) times a day as needed for muscle spasms          Subjective:    Patient ID: Mahsa Yañez is a 79 y o  female  Patient presents with sciatic nerve pain on left side  This has been for about a month  Pain is currently 8/10  Nothing makes it worse or better  Very hard to get out of bed in the morning  Tried ibuprofen with no relief  She does have numbness/tingling in the left leg  The following portions of the patient's history were reviewed and updated as appropriate: allergies, current medications, past family history, past medical history, past social history, past surgical history and problem list     Review of Systems   Constitutional: Negative for chills and fever  HENT: Negative for ear pain and sore throat  Eyes: Negative for pain and visual disturbance  Respiratory: Negative for cough and shortness of breath  Cardiovascular: Negative for chest pain and palpitations  Gastrointestinal: Negative for abdominal pain and vomiting  Genitourinary: Negative for dysuria and hematuria  Musculoskeletal: Positive for arthralgias and back pain  Skin: Negative for color change and rash  Neurological: Positive for numbness  Negative for dizziness, light-headedness and headaches  All other systems reviewed and are negative          /68   Pulse 77   Ht 4' 7" (1 397 m)   Wt 86 2 kg (190 lb)   SpO2 92%   BMI 44 16 kg/m²   Social History Socioeconomic History    Marital status: Single     Spouse name: Not on file    Number of children: Not on file    Years of education: Not on file    Highest education level: Not on file   Occupational History    Occupation: retired   Tobacco Use    Smoking status: Never Smoker    Smokeless tobacco: Never Used   Vaping Use    Vaping Use: Never used   Substance and Sexual Activity    Alcohol use: No    Drug use: No    Sexual activity: Never   Other Topics Concern    Not on file   Social History Narrative    Denied drinking coffee    Denied exercise habits    Most recent tobacco use screenin2018      Do you currently or have you served in the [employer]:   No      Were you activated, into active duty, as a member of the [employer] or as a Reservist:   No          Social Determinants of Health     Financial Resource Strain: Not on file   Food Insecurity: Not on file   Transportation Needs: Not on file   Physical Activity: Not on file   Stress: Not on file   Social Connections: Not on file   Intimate Partner Violence: Not on file   Housing Stability: Not on file     Past Medical History:   Diagnosis Date    Anemia 2018    Anxiety     Arthritis     AVB (atrioventricular block)     first degree    Cataract     CHF (congestive heart failure) ([facility] )     COPD, mild ([facility] )     Coronary artery disease     Dislocation of right shoulder joint     Frequent UTI     GERD (gastroesophageal reflux disease)     H/O: pneumonia     Heme positive stool     Hyperlipidemia     Hypertension     Hypothyroidism     Morbid obesity with BMI of 50 0-59 9, adult ([facility] )     Obesity, morbid ([facility] ) 2018    JANICE on CPAP     Pulmonary hypertension ([facility] ) 2018    Severe aortic stenosis     Simple goiter     Skin cyst     within the armpits, right    Wears glasses      Family History   Problem Relation Age of Onset    Diabetes Mother     Stroke Mother     Cancer Father     Lung cancer Father     Diabetes Sister     Heart disease Sister     Hypertension Sister     Coronary artery disease Family     Diabetes Family     Hypertension Family     Cancer Family     Stroke Family     Thyroid disease Neg Hx      Past Surgical History:   Procedure Laterality Date    BREAST BIOPSY      CARDIAC CATHETERIZATION      CARPAL TUNNEL RELEASE Bilateral     CHOLECYSTECTOMY      DILATION AND CURETTAGE OF UTERUS      HYSTEROSCOPY      MASTOID SURGERY      MT COLONOSCOPY FLX DX W/COLLJ SPEC WHEN PFRMD N/A 9/6/2018    Procedure: COLONOSCOPY;  Surgeon: Meaghan Delacruz MD;  Location: [facility]; Service: Gastroenterology    MT ECHO TRANSESOPHAG R-T 2D W/PRB IMG ACQUISJ I&R N/A 10/9/2018    Procedure: INTRA-OP TRANSESOPHAGEAL ECHOCARDIOGRAM (GARRISON); Surgeon: Trey Pink DO;  [facility];  Service: Cardiac Surgery    MT ESOPHAGOGASTRODUODENOSCOPY TRANSORAL DIAGNOSTIC N/A 8/31/2018    Procedure: ESOPHAGOGASTRODUODENOSCOPY (EGD); Surgeon: Meaghan Delacruz MD;  [facility]; Service: Gastroenterology    MT REPLACE AORTIC VALVE OPENFEMORAL ARTERY APPROACH N/A 10/9/2018    Procedure: REPLACEMENT AORTIC VALVE TRANSCATHETER (TAVR) TRANSFEMORAL W/ 23 MM MENDOZA NOE S3 VALVE (ACCESS OF LEFT);   Surgeon: Trey Pink DO;  [facility];  Service: Cardiac Surgery    TOTAL HIP ARTHROPLASTY Left 2007    TOTAL KNEE ARTHROPLASTY Bilateral        Current Outpatient Medications:     cyclobenzaprine (FLEXERIL) 5 mg tablet, Take 1 tablet (5 mg total) by mouth 2 (two) times a day as needed for muscle spasms, Disp: 30 tablet, Rfl: 0    meloxicam (Mobic) 15 mg tablet, Take 1 tablet (15 mg total) by mouth daily, Disp: 7 tablet, Rfl: 0    albuterol (2 5 mg/3 mL) 0 083 % nebulizer solution, Take 3 mL (2 5 mg total) by nebulization every 6 (six) hours as needed for wheezing or shortness of breath, Disp: 360 mL, Rfl: 5    albuterol (PROVENTIL HFA,VENTOLIN HFA) 90 mcg/act inhaler, Inhale 2 puffs every 6 (six) hours as needed for wheezing, Disp: 1 Inhaler, Rfl: 3    alendronate (FOSAMAX) 70 mg tablet, Take 1 tablet (70 mg total) by mouth every 7 days, Disp: 12 tablet, Rfl: 1    amoxicillin (AMOXIL) 500 mg capsule, TAKE 4 CAPSULES (2,000 MG TOTAL) BY MOUTH EVERY 8 (EIGHT) HOURS FOR 1 DAY, Disp: , Rfl:     aspirin (ECOTRIN LOW STRENGTH) 81 mg EC tablet, Take 1 tablet (81 mg total) by mouth daily, Disp: 100 tablet, Rfl: 0    b complex vitamins capsule, Take 1 capsule by mouth 2 (two) times a day  , Disp: , Rfl:     budesonide-formoterol (Symbicort) 160-4 5 mcg/act inhaler, Inhale 2 puffs 2 (two) times a day, Disp: 10 2 g, Rfl: 3    Calcium Carb-Cholecalciferol (CALCIUM 600 + D PO), Take 1 tablet by mouth 2 (two) times a day, Disp: , Rfl:     Cranberry 250 MG TABS, Take by mouth, Disp: , Rfl:     Diclofenac Sodium (VOLTAREN) 1 %, Apply 2 g topically 4 (four) times a day, Disp: 50 g, Rfl: 0    Fesoterodine Fumarate ER (Toviaz) 8 MG TB24, Take 8 mg by mouth daily , Disp: , Rfl:     furosemide (LASIX) 20 mg tablet, TAKE 1 TABLET (20 MG TOTAL) BY MOUTH DAILY IN THE MORNING, Disp: 90 tablet, Rfl: 3    levothyroxine 50 mcg tablet, Take 1 tablet (50 mcg total) by mouth daily, Disp: 30 tablet, Rfl: 0    levothyroxine 50 mcg tablet, TAKE 1 TABLET BY MOUTH EVERY DAY, Disp: 90 tablet, Rfl: 1    mometasone (ELOCON) 0 1 % cream, APPLY TO THE RIGHT EAR CANAL TWICE DAILY FOR 2 WEEKS, THEN DECREASE TO ONCE AT BEDTIME OR AS NEEDED, Disp: 45 g, Rfl: 0    olmesartan (BENICAR) 5 mg tablet, TAKE 1 TABLET BY MOUTH EVERY DAY, Disp: 90 tablet, Rfl: 3    omeprazole (PriLOSEC) 40 MG capsule, TAKE 1 CAPSULE BY MOUTH TWICE A DAY, Disp: 180 capsule, Rfl: 1    oxybutynin (DITROPAN-XL) 10 MG 24 hr tablet, Take 10 mg by mouth daily, Disp: , Rfl:     sertraline (ZOLOFT) 100 mg tablet, TAKE 1 TABLET BY MOUTH EVERY DAY, Disp: 90 tablet, Rfl: 0    simvastatin (ZOCOR) 40 mg tablet, Take 1 tablet (40 mg total) by mouth daily at bedtime, Disp: 90 tablet, Rfl: 1    Allergies   Allergen Reactions    Latex Rash    Neosporin [Neomycin-Bacitracin Zn-Polymyx] Rash and Other (See Comments)     hives per [facility] order          Lab Review   Appointment on 08/01/2022   Component Date Value    WBC 08/01/2022 10 68 (A)    RBC 08/01/2022 4 10     Hemoglobin 08/01/2022 11 7     Hematocrit 08/01/2022 37 0     MCV 08/01/2022 90     MCH 08/01/2022 28 5     MCHC 08/01/2022 31 6     RDW 08/01/2022 17 4 (A)    MPV 08/01/2022 9 5     Platelets 58/70/9672 355     nRBC 08/01/2022 0     Neutrophils Relative 08/01/2022 68     Immat GRANS % 08/01/2022 1     Lymphocytes Relative 08/01/2022 17     Monocytes Relative 08/01/2022 8     Eosinophils Relative 08/01/2022 5     Basophils Relative 08/01/2022 1     Neutrophils Absolute 08/01/2022 7 32     Immature Grans Absolute 08/01/2022 0 12     Lymphocytes Absolute 08/01/2022 1 83     Monocytes Absolute 08/01/2022 0 81     Eosinophils Absolute 08/01/2022 0 55     Basophils Absolute 08/01/2022 0 05     Sodium 08/01/2022 140     Potassium 08/01/2022 4 7     Chloride 08/01/2022 104     CO2 08/01/2022 27     ANION GAP 08/01/2022 9     BUN 08/01/2022 14     Creatinine 08/01/2022 0 78     Glucose, Fasting 08/01/2022 90     Calcium 08/01/2022 10 0     Corrected Calcium 08/01/2022 10 6 (A)    AST 08/01/2022 26     ALT 08/01/2022 24     Alkaline Phosphatase 08/01/2022 87     Total Protein 08/01/2022 7 5     Albumin 08/01/2022 3 3 (A)    Total Bilirubin 08/01/2022 0 34     eGFR 08/01/2022 71     Cholesterol 08/01/2022 125     Triglycerides 08/01/2022 105     HDL, Direct 08/01/2022 60     LDL Calculated 08/01/2022 44     Non-HDL-Chol (CHOL-HDL) 08/01/2022 65     TSH 3RD GENERATON 08/01/2022 2 010      Appointment on 08/01/2022   Component Date Value    WBC 08/01/2022 10 68 (A)    RBC 08/01/2022 4 10     Hemoglobin 08/01/2022 11 7     Hematocrit 08/01/2022 37 0     MCV 08/01/2022 90     MCH 08/01/2022 28 5     MCHC 08/01/2022 31 6     RDW 08/01/2022 17 4 (A)    MPV 08/01/2022 9 5     Platelets 71/36/5433 355     nRBC 08/01/2022 0     Neutrophils Relative 08/01/2022 68     Immat GRANS % 08/01/2022 1     Lymphocytes Relative 08/01/2022 17     Monocytes Relative 08/01/2022 8     Eosinophils Relative 08/01/2022 5     Basophils Relative 08/01/2022 1     Neutrophils Absolute 08/01/2022 7 32     Immature Grans Absolute 08/01/2022 0 12     Lymphocytes Absolute 08/01/2022 1 83     Monocytes Absolute 08/01/2022 0 81     Eosinophils Absolute 08/01/2022 0 55     Basophils Absolute 08/01/2022 0 05     Sodium 08/01/2022 140     Potassium 08/01/2022 4 7     Chloride 08/01/2022 104     CO2 08/01/2022 27     ANION GAP 08/01/2022 9     BUN 08/01/2022 14     Creatinine 08/01/2022 0 78     Glucose, Fasting 08/01/2022 90     Calcium 08/01/2022 10 0     Corrected Calcium 08/01/2022 10 6 (A)    AST 08/01/2022 26     ALT 08/01/2022 24     Alkaline Phosphatase 08/01/2022 87     Total Protein 08/01/2022 7 5     Albumin 08/01/2022 3 3 (A)    Total Bilirubin 08/01/2022 0 34     eGFR 08/01/2022 71     Cholesterol 08/01/2022 125     Triglycerides 08/01/2022 105     HDL, Direct 08/01/2022 60     LDL Calculated 08/01/2022 44     Non-HDL-Chol (CHOL-HDL) 08/01/2022 65     TSH 3RD GENERATON 08/01/2022 2 010    Hospital Outpatient Visit on 06/22/2022   Component Date Value    AV area peak jose 06/22/2022 1 5     LA size 06/22/2022 4 1     Aortic valve mean veloci* 06/22/2022 19 60     Triscuspid Valve Regurgi* 06/22/2022 52 0     Tricuspid valve peak reg* 06/22/2022 3 61     LVPWd 06/22/2022 1 30     Left Atrium Area-systoli* 06/22/2022 20 7     Left Atrium Area-systoli* 06/22/2022 18 2     MV E' Tissue Velocity La* 06/22/2022 6     TR Peak Jose 06/22/2022 3 6     IVSd 06/22/2022 5 28     LV DIASTOLIC VOLUME (MOD* 50/00/0074 51     LEFT VENTRICLE SYSTOLIC * 48/94/3119 17     Left ventricular stroke * 06/22/2022 34 00     A4C EF 06/22/2022 69     LA length (A2C) 06/22/2022 5 70     LVIDd 06/22/2022 3 50     IVS 06/22/2022 1 3     LVIDS 06/22/2022 2 30     FS 06/22/2022 34     Ao root 06/22/2022 3 30     RVID d 06/22/2022 2 9     LVOT mn grad 06/22/2022 5 0     AV area by cont VTI 06/22/2022 1 9     AV mean gradient 06/22/2022 17     AV LVOT peak gradient 06/22/2022 7     MV valve area p 1/2 meth* 06/22/2022 1 82     E wave deceleration time 06/22/2022 418     LVOT diameter 06/22/2022 2 0     LVOT peak jose 06/22/2022 1 28     LVOT peak VTI 06/22/2022 35 72     Aortic valve peak veloci* 06/22/2022 2 74     Ao VTI 06/22/2022 59 52     LVOT stroke volume 06/22/2022 112 16     AV peak gradient 06/22/2022 30     MV Peak E Jose 06/22/2022 182     MV Peak A Jose 06/22/2022 2 15     NIKO A4C 06/22/2022 18 1     RAA A4C 06/22/2022 11 1     MV stenosis pressure 1/2* 06/22/2022 121     LVOT stroke volume index 06/22/2022 62 30     LVSV, 2D 06/22/2022 34     LVOT area 06/22/2022 3 14     Dimensionless velociy in* 06/22/2022 0 47     AV valve area 06/22/2022 1 88    Orders Only on 06/21/2022   Component Date Value    LEUKOCYTE ESTERASE,UA 06/21/2022 500     NITRITE,UA 06/21/2022 -     SL AMB POCT UROBILINOGEN 06/21/2022 3 5     POCT URINE PROTEIN 06/21/2022 -      PH,UA 06/21/2022 6 0     BLOOD,UA 06/21/2022 10     SPECIFIC GRAVITY,UA 06/21/2022 1 010     KETONES,UA 06/21/2022 -     BILIRUBIN,UA 06/21/2022 -     GLUCOSE, UA 06/21/2022 -      COLOR,UA 06/21/2022 YELLOW     CLARITY,UA 06/21/2022 CLOUDY    Clinical Support on 06/21/2022   Component Date Value    Urine Culture 06/21/2022 >100,000 cfu/ml Enterobacter cloacae (A)   Admission on 06/06/2022, Discharged on 06/06/2022   Component Date Value    Hemoglobin 06/06/2022 11 9     Hematocrit 06/06/2022 39 2     Sodium 06/06/2022 140     Potassium 06/06/2022 5 1     Chloride 06/06/2022 107     CO2 06/06/2022 23     ANION GAP 06/06/2022 10     BUN 06/06/2022 31 (A)    Creatinine 06/06/2022 0 84     Glucose 06/06/2022 88     Calcium 06/06/2022 9 7     eGFR 06/06/2022 64     hs TnI 0hr 06/06/2022 7     Color, UA 06/06/2022 Yellow     Clarity, UA 06/06/2022 Clear     Specific Gravity, UA 06/06/2022 1 015     pH, UA 06/06/2022 6 0     Leukocytes, UA 06/06/2022 Small (A)    Nitrite, UA 06/06/2022 Positive (A)    Protein, UA 06/06/2022 Negative     Glucose, UA 06/06/2022 Negative     Ketones, UA 06/06/2022 Negative     Urobilinogen, UA 06/06/2022 0 2     Bilirubin, UA 06/06/2022 Negative     Occult Blood, UA 06/06/2022 Negative     hs TnI 2hr 06/06/2022 7     Delta 2hr hsTnI 06/06/2022 0     RBC, UA 06/06/2022 None Seen     WBC, UA 06/06/2022 2-4     Epithelial Cells 06/06/2022 None Seen     Bacteria, UA 06/06/2022 None Seen     Ventricular Rate 06/06/2022 61     Atrial Rate 06/06/2022 61     IA Interval 06/06/2022 230     QRSD Interval 06/06/2022 80     QT Interval 06/06/2022 420     QTC Interval 06/06/2022 422     P Axis 06/06/2022 59     QRS Axis 06/06/2022 -16     T Wave [place] 06/06/2022 51    Office Visit on 05/09/2022   Component Date Value    Creatinine, Ur 08/01/2022 19 5     Microalbum  ,U,Random 08/01/2022 12 7     Microalb Creat Ratio 08/01/2022 65 (A)    Color, UA 08/01/2022 Yellow     Clarity, UA 08/01/2022 Clear     Specific Gravity, UA 08/01/2022 1 010     pH, UA 08/01/2022 6 0     Leukocytes, UA 08/01/2022 Small (A)    Nitrite, UA 08/01/2022 Negative     Protein, UA 08/01/2022 Negative     Glucose, UA 08/01/2022 Negative     Ketones, UA 08/01/2022 Negative     Urobilinogen, UA 08/01/2022 0 2     Bilirubin, UA 08/01/2022 Negative     Occult Blood, UA 08/01/2022 Negative     RBC, UA 08/01/2022 None Seen     WBC, UA 08/01/2022 4-10 (A)    Epithelial Cells 08/01/2022 Occasional     Bacteria, UA 08/01/2022 Moderate (A)   Admission on 04/26/2022, Discharged on 04/26/2022   Component Date Value    WBC 04/26/2022 9 67     RBC 04/26/2022 4 50     Hemoglobin 04/26/2022 12 4     Hematocrit 04/26/2022 40 0     MCV 04/26/2022 89     MCH 04/26/2022 27 6     MCHC 04/26/2022 31 0 (A)    RDW 04/26/2022 15 4 (A)    MPV 04/26/2022 9 5     Platelets 30/02/6914 371     nRBC 04/26/2022 0     Neutrophils Relative 04/26/2022 66     Immat GRANS % 04/26/2022 0     Lymphocytes Relative 04/26/2022 20     Monocytes Relative 04/26/2022 8     Eosinophils Relative 04/26/2022 5     Basophils Relative 04/26/2022 1     Neutrophils Absolute 04/26/2022 6 41     Immature Grans Absolute 04/26/2022 0 04     Lymphocytes Absolute 04/26/2022 1 92     Monocytes Absolute 04/26/2022 0 77     Eosinophils Absolute 04/26/2022 0 48     Basophils Absolute 04/26/2022 0 05     Sodium 04/26/2022 139     Potassium 04/26/2022 4 6     Chloride 04/26/2022 107     CO2 04/26/2022 26     ANION GAP 04/26/2022 6     BUN 04/26/2022 31 (A)    Creatinine 04/26/2022 1 03     Glucose 04/26/2022 110     Calcium 04/26/2022 9 3     Corrected Calcium 04/26/2022 9 9     AST 04/26/2022 36     ALT 04/26/2022 42     Alkaline Phosphatase 04/26/2022 100     Total Protein 04/26/2022 7 2     Albumin 04/26/2022 3 2 (A)    Total Bilirubin 04/26/2022 0 22     eGFR 04/26/2022 50     LACTIC ACID 04/26/2022 0 9     Procalcitonin 04/26/2022 <0 05     Protime 04/26/2022 13 6     INR 04/26/2022 1 08     PTT 04/26/2022 35     Blood Culture 04/26/2022 No Growth After 5 Days       hs TnI 0hr 04/26/2022 9     NT-proBNP 04/26/2022 897 (A)    Color, UA 04/26/2022 Yellow     Clarity, UA 04/26/2022 Clear     Specific Gravity, UA 04/26/2022 1 010     pH, UA 04/26/2022 6 0     Leukocytes, UA 04/26/2022 Moderate (A)    Nitrite, UA 04/26/2022 Positive (A)    Protein, UA 04/26/2022 Negative     Glucose, UA 04/26/2022 Negative     Ketones, UA 04/26/2022 Negative     Urobilinogen, UA 04/26/2022 0 2     Bilirubin, UA 04/26/2022 Negative     Occult Blood, UA 04/26/2022 Negative     hs TnI 2hr 04/26/2022 10     Delta 2hr hsTnI 04/26/2022 1     Ventricular Rate 04/26/2022 78     Atrial Rate 04/26/2022 78     LA Interval 04/26/2022 216     QRSD Interval 04/26/2022 78     QT Interval 04/26/2022 396     QTC Interval 04/26/2022 451     P Axis 04/26/2022 54     QRS Axis 04/26/2022 -32     T Wave Axis 04/26/2022 54     RBC, UA 04/26/2022 None Seen     WBC, UA 04/26/2022 10-20 (A)    Epithelial Cells 04/26/2022 Occasional     Bacteria, UA 04/26/2022 Moderate (A)    Urine Culture 04/26/2022 >100,000 cfu/ml Klebsiella pneumoniae (A)   Office Visit on 04/26/2022   Component Date Value    LEUKOCYTE ESTERASE,UA 04/26/2022 3+     NITRITE,UA 04/26/2022 -     SL AMB POCT UROBILINOGEN 04/26/2022 -     POCT URINE PROTEIN 04/26/2022 -      PH,UA 04/26/2022 6 0     BLOOD,UA 04/26/2022 -     SPECIFIC GRAVITY,UA 04/26/2022 1 015     KETONES,UA 04/26/2022 -     BILIRUBIN,UA 04/26/2022 -     GLUCOSE, UA 04/26/2022 -      COLOR,UA 04/26/2022 yellow     CLARITY,UA 04/26/2022 cloudy     Urine Culture 04/26/2022 >100,000 cfu/ml Klebsiella pneumoniae (A)    Color, UA 04/26/2022 Yellow     Clarity, UA 04/26/2022 Turbid     Specific Gravity, UA 04/26/2022 1 008     pH, UA 04/26/2022 5 5     Leukocytes, UA 04/26/2022 Large (A)    Nitrite, UA 04/26/2022 Positive (A)    Protein, UA 04/26/2022 Negative     Glucose, UA 04/26/2022 Negative     Ketones, UA 04/26/2022 Negative     Urobilinogen, UA 04/26/2022 <2 0     Bilirubin, UA 04/26/2022 Negative     Occult Blood, UA 04/26/2022 Negative     RBC, UA 04/26/2022 None Seen     WBC, UA 04/26/2022 Innumerable (A)    Epithelial Cells 04/26/2022 Occasional     Bacteria, UA 04/26/2022 Innumerable (A)    MUCUS THREADS 04/26/2022 Occasional (A)    WBC Clumps 04/26/2022 PRESENT     Urine Culture 04/26/2022 >100,000 cfu/ml Klebsiella pneumoniae (A)        Imaging: FL spine and pain procedure    Result Date: 8/16/2022  Narrative: PROCEDURE NOTE PATIENT NAME:  Joni Akbar MEDICAL RECORD NUMBER:  [id number] YOB: 1940 DATE OF PROCEDURE:  08/16/22 PROCEDURE:  Right glenohumeral joint steroid injection under fluoroscopic guidance   PREPROCEDURE DIAGNOSIS:  Osteoarthritis of shoulder   PHYSICIAN: Devonte Cutler MD   MEDICATIONS INJECTED: 1 mL of Depomedrol (40 mg) and 4 ml 0 25% preservative-free bupivacaine   LOCAL ANESTHETIC INJECTED: Total of 1 mL of 1% lidocaine   SEDATION MEDICATIONS: None   ESTIMATED BLOOD LOSS: None   COMPLICATIONS: None   TECHNIQUE: Time-out was taken to identify the correct patient, procedure and side prior to starting the procedure  While lying in the supine position, the patient was prepped and draped in the usual sterile fashion using ChloraPrep and a fenestrated drape as well as sterile towels  The target site was determined under fluoroscopy  Local anesthetic was given by raising a skin wheal and going down to the hub of a 25-gauge 1 5 needle  A 22-gauge, 3 5-inch Spinal needle was advanced under intermittent fluoroscopy  When the tip of the needle was thought to be in the appropriate position along the upper medial third of each humeral head, aspiration was attempted to check for intravascular placement  1 mL Omnipaque 300 mg contrast was then injectedto confirm intraarticular spread, as well as showing no vascular runoff  Medication was then injected slowly  The procedure was completed without complications and was tolerated well  The patient was monitored after the procedure  The patient  was given post-procedure and discharge instructions to follow at home  The patient was discharged in stable condition and instructed that we would call in 7 days for follow-up      Objective:     Physical Exam  Constitutional:       Appearance: She is well-developed  She is obese     Cardiovascular:      Rate and Rhythm: Normal rate and regular rhythm  Heart sounds: Normal heart sounds  No murmur heard  Pulmonary:      Effort: Pulmonary effort is normal  No respiratory distress  Breath sounds: Normal breath sounds  Musculoskeletal:      Lumbar back: Positive left straight leg raise test       Left hip: Tenderness present  Decreased range of motion  Right lower leg: No swelling  Left lower leg: No swelling  Skin:     General: Skin is warm and dry  Neurological:      Mental Status: She is alert and oriented to person, place, and time  Psychiatric:         Mood and Affect: Mood normal            There are no Patient Instructions on file for this visit  MABEL Hallman    Portions of the record may have been created with voice recognition software  Occasional wrong word or "sound a like" substitutions may have occurred due to the inherent limitations of voice recognition software  Read the chart carefully and recognize, using context, where substitutions have occurred

## 2022-08-30 NOTE — TELEPHONE ENCOUNTER
Patient called stating she wants a muscle relaxer for sciatic pain she is having  I advised she will need an appointment and offered her an appointment today which she refused stating she doesn't think she can walk to the appointment  She wants to know if you can send in a muscle relaxer without seeing her?

## 2022-08-31 ENCOUNTER — TELEPHONE (OUTPATIENT)
Dept: FAMILY MEDICINE CLINIC | Facility: CLINIC | Age: 82
End: 2022-08-31

## 2022-08-31 NOTE — TELEPHONE ENCOUNTER
Pt called regarding a prior auth for her muscle relaxer, confirmed that she received the message sent earlier  Awaiting insurance determination

## 2022-08-31 NOTE — TELEPHONE ENCOUNTER
----- Message from Christina Jiménez sent at 8/30/2022  4:43 PM EDT -----  Patient called and is asking if a prior auth request came over for the medications that were sent in today       She only picked up Mobic and I believe the muscle relaxer need insurance auth

## 2022-09-02 ENCOUNTER — TELEPHONE (OUTPATIENT)
Dept: FAMILY MEDICINE CLINIC | Facility: CLINIC | Age: 82
End: 2022-09-02

## 2022-09-02 NOTE — TELEPHONE ENCOUNTER
pharmacy was able to get medication approved through [facility] will pick it up today and call us back if there is any issues  You can disregard last message sent by  Shaila Newsome

## 2022-09-05 ENCOUNTER — HOSPITAL ENCOUNTER (EMERGENCY)
Facility: HOSPITAL | Age: 82
Discharge: HOME | End: 2022-09-05
Attending: EMERGENCY MEDICINE
Payer: MEDICARE

## 2022-09-05 VITALS
TEMPERATURE: 99.2 F | HEART RATE: 68 BPM | DIASTOLIC BLOOD PRESSURE: 72 MMHG | OXYGEN SATURATION: 94 % | SYSTOLIC BLOOD PRESSURE: 166 MMHG | RESPIRATION RATE: 18 BRPM

## 2022-09-05 DIAGNOSIS — M54.32 SCIATICA OF LEFT SIDE: Primary | ICD-10-CM

## 2022-09-05 PROCEDURE — 99283 EMERGENCY DEPT VISIT LOW MDM: CPT

## 2022-09-05 PROCEDURE — 99284 EMERGENCY DEPT VISIT MOD MDM: CPT | Performed by: NURSE PRACTITIONER

## 2022-09-05 RX ORDER — GABAPENTIN 100 MG/1
100 CAPSULE ORAL 3 TIMES DAILY PRN
Qty: 30 CAPSULE | Refills: 0 | Status: SHIPPED | OUTPATIENT
Start: 2022-09-05

## 2022-09-05 RX ORDER — HYDROCODONE BITARTRATE AND ACETAMINOPHEN 5; 325 MG/1; MG/1
1 TABLET ORAL ONCE
Status: COMPLETED | OUTPATIENT
Start: 2022-09-05 | End: 2022-09-05

## 2022-09-05 RX ADMIN — HYDROCODONE BITARTRATE AND ACETAMINOPHEN 1 TABLET: 5; 325 TABLET ORAL at 15:29

## 2022-09-05 NOTE — ED PROVIDER NOTES
History  Chief Complaint   Patient presents with    Leg Pain     Patient c/o left leg pain that she states is radiating down her left leg  Patient states she saw her doctor last Tuesday and put on medication but is having no improvement  79-year-old female presenting here with chief complaint of left leg pain  She was seen by her primary care diagnosis sciatica and put on meloxicam and cyclobenzaprine which has not been providing her any relief  She has a positive straight leg raise test on the left side and pain consistent with sciatica  No recent trauma  No rash  Prior to Admission Medications   Prescriptions Last Dose Informant Patient Reported? Taking?    Calcium Carb-Cholecalciferol (CALCIUM 600 + D PO)  Self Yes No   Sig: Take 1 tablet by mouth 2 (two) times a day   Cranberry 250 MG TABS  Self Yes No   Sig: Take by mouth   Diclofenac Sodium (VOLTAREN) 1 %   No No   Sig: Apply 2 g topically 4 (four) times a day   Fesoterodine Fumarate ER (Toviaz) 8 MG TB24  Self Yes No   Sig: Take 8 mg by mouth daily    albuterol (2 5 mg/3 mL) 0 083 % nebulizer solution  Self No No   Sig: Take 3 mL (2 5 mg total) by nebulization every 6 (six) hours as needed for wheezing or shortness of breath   albuterol (PROVENTIL HFA,VENTOLIN HFA) 90 mcg/act inhaler  Self No No   Sig: Inhale 2 puffs every 6 (six) hours as needed for wheezing   alendronate (FOSAMAX) 70 mg tablet   No No   Sig: Take 1 tablet (70 mg total) by mouth every 7 days   amoxicillin (AMOXIL) 500 mg capsule   Yes No   Sig: TAKE 4 CAPSULES (2,000 MG TOTAL) BY MOUTH EVERY 8 (EIGHT) HOURS FOR 1 DAY   aspirin (ECOTRIN LOW STRENGTH) 81 mg EC tablet  Self No No   Sig: Take 1 tablet (81 mg total) by mouth daily   b complex vitamins capsule  Self Yes No   Sig: Take 1 capsule by mouth 2 (two) times a day     budesonide-formoterol (Symbicort) 160-4 5 mcg/act inhaler   No No   Sig: Inhale 2 puffs 2 (two) times a day   cyclobenzaprine (FLEXERIL) 5 mg tablet   No No   Sig: Take 1 tablet (5 mg total) by mouth 2 (two) times a day as needed for muscle spasms   furosemide (LASIX) 20 mg tablet   No No   Sig: TAKE 1 TABLET (20 MG TOTAL) BY MOUTH DAILY IN THE MORNING   levothyroxine 50 mcg tablet   No No   Sig: Take 1 tablet (50 mcg total) by mouth daily   levothyroxine 50 mcg tablet   No No   Sig: TAKE 1 TABLET BY MOUTH EVERY DAY   meloxicam (Mobic) 15 mg tablet   No No   Sig: Take 1 tablet (15 mg total) by mouth daily   mometasone (ELOCON) 0 1 % cream  Self No No   Sig: APPLY TO THE RIGHT EAR CANAL TWICE DAILY FOR 2 WEEKS, THEN DECREASE TO ONCE AT BEDTIME OR AS NEEDED   olmesartan (BENICAR) 5 mg tablet   No No   Sig: TAKE 1 TABLET BY MOUTH EVERY DAY   omeprazole (PriLOSEC) 40 MG capsule   No No   Sig: TAKE 1 CAPSULE BY MOUTH TWICE A DAY   oxybutynin (DITROPAN-XL) 10 MG 24 hr tablet   Yes No   Sig: Take 10 mg by mouth daily   sertraline (ZOLOFT) 100 mg tablet   No No   Sig: TAKE 1 TABLET BY MOUTH EVERY DAY   simvastatin (ZOCOR) 40 mg tablet   No No   Sig: Take 1 tablet (40 mg total) by mouth daily at bedtime      Facility-Administered Medications: None       Past Medical History:   Diagnosis Date    Anemia 08/22/2018    Anxiety     Arthritis     AVB (atrioventricular block)     first degree    Cataract     [facility]estive [facility] failure) [facility])     COPD, mild ([facility])     Coronary artery disease     Dislocation of right shoulder joint     Frequent UTI     GERD (gastroesophageal reflux disease)     H/O: pneumonia     Heme positive stool     Hyperlipidemia     Hypertension     Hypothyroidism     Morbid obesity with BMI of 50 0-59 9, adult ([facility])     Obesity, morbid ([facility] [address] ) 08/22/2018    JANICE on CPAP     Pulmonary hypertension ([facility]) 08/22/2018    Severe aortic stenosis     Simple goiter     Skin cyst     within the armpits, right    Wears glasses        Past Surgical History:   Procedure Laterality Date    BREAST BIOPSY      CARDIAC CATHETERIZATION      CARPAL TUNNEL RELEASE Bilateral     CHOLECYSTECTOMY      DILATION AND CURETTAGE OF UTERUS      HYSTEROSCOPY      MASTOID SURGERY      MD COLONOSCOPY FLX DX W/COLLJ SPEC WHEN PFRMD N/A 9/6/2018    Procedure: COLONOSCOPY;  Surgeon: Alex Cox MD;  Location: [facility]; Service: Gastroenterology    MD ECHO TRANSESOPHAG R-T 2D W/PRB IMG ACQUISJ I&R N/A 10/9/2018    Procedure: INTRA-OP TRANSESOPHAGEAL ECHOCARDIOGRAM (GARRISON); Surgeon: Kash Santillan DO;  [facility];  Service: Cardiac Surgery    MD ESOPHAGOGASTRODUODENOSCOPY TRANSORAL DIAGNOSTIC N/A 8/31/2018    Procedure: ESOPHAGOGASTRODUODENOSCOPY (EGD); Surgeon: Alex Cox MD;  [facility]: [facility]; Service: Gastroenterology    MD REPLACE AORTIC VALVE OPENFEMORAL ARTERY APPROACH N/A 10/9/2018    Procedure: REPLACEMENT AORTIC VALVE TRANSCATHETER (TAVR) TRANSFEMORAL W/ 23 MM MENDOZA NOE S3 VALVE (ACCESS OF LEFT); Surgeon: Kash Santillan DO;  [facility];  Service: Cardiac Surgery    TOTAL HIP ARTHROPLASTY Left 2007    TOTAL KNEE ARTHROPLASTY Bilateral        Family History   Problem Relation Age of Onset    Diabetes Mother     Stroke Mother     Cancer Father     Lung cancer Father     Diabetes Sister     Heart disease Sister     Hypertension Sister     Coronary artery disease Family     Diabetes Family     Hypertension Family     Cancer Family     Stroke Family     Thyroid disease Neg Hx      I have reviewed and agree with the history as documented      E-Cigarette/Vaping    E-Cigarette Use Never User      E-Cigarette/Vaping Substances    Nicotine No     THC No     CBD No     Flavoring No     Other No     Unknown No      Social History     Tobacco Use    Smoking status: Never Smoker    Smokeless tobacco: Never Used   Vaping Use    Vaping Use: Never used   Substance Use Topics    Alcohol use: No    Drug use: No       Review of Systems   Constitutional: Negative for diaphoresis, fatigue and fever    HENT: Negative for congestion, ear pain, nosebleeds and sore throat  Eyes: Negative for photophobia, pain, discharge and visual disturbance  Respiratory: Negative for cough, choking, chest tightness, shortness of breath and wheezing  Cardiovascular: Negative for chest pain and palpitations  Gastrointestinal: Negative for abdominal distention, abdominal pain, diarrhea and vomiting  Genitourinary: Negative for dysuria, flank pain and frequency  Musculoskeletal: Positive for back pain  Negative for gait problem and joint swelling  Skin: Negative for color change and rash  Neurological: Negative for dizziness, syncope and headaches  Psychiatric/Behavioral: Negative for behavioral problems and confusion  The patient is not nervous/anxious  All other systems reviewed and are negative  Physical Exam  Physical Exam  Vitals and nursing note reviewed  Constitutional:       General: She is not in acute distress  Appearance: She is well-developed  She is not ill-appearing or toxic-appearing  HENT:      Head: Normocephalic and atraumatic  Mouth/Throat:      Dentition: Normal dentition  Eyes:      General:         Right eye: No discharge  Left eye: No discharge  Cardiovascular:      Rate and Rhythm: Normal rate and regular rhythm  Pulmonary:      Effort: Pulmonary effort is normal  No accessory muscle usage or respiratory distress  Abdominal:      General: There is no distension  Tenderness: There is no guarding  Musculoskeletal:      Cervical back: Normal range of motion and neck supple  Lumbar back: No bony tenderness  Normal range of motion  Positive left straight leg raise test    Skin:     General: Skin is warm and dry  Neurological:      Mental Status: She is alert and oriented to person, place, and time  Coordination: Coordination normal    Psychiatric:         Behavior: Behavior is cooperative           Vital Signs  ED Triage Vitals Temperature Pulse Respirations Blood Pressure SpO2   09/05/22 1423 09/05/22 1423 09/05/22 1423 09/05/22 1423 09/05/22 1423   99 2 °F (37 3 °C) 68 18 166/72 94 %      Temp Source Heart Rate Source Patient Position - Orthostatic VS BP Location FiO2 (%)   09/05/22 1423 -- -- -- --   Tympanic          Pain Score       09/05/22 1529       10 - Worst Possible Pain           Vitals:    09/05/22 1423   BP: 166/72   Pulse: 68         Visual Acuity      ED Medications  Medications   HYDROcodone-acetaminophen (NORCO) 5-325 mg per tablet 1 tablet (1 tablet Oral Given 9/5/22 1529)       Diagnostic Studies  Results Reviewed     None                 No orders to display              Procedures  Procedures         ED Course                               SBIRT 22yo+    Flowsheet Row Most Recent Value   SBIRT (25 yo +)    In order to provide better care to our patients, we are screening all of our patients for alcohol and drug use  Would it be okay to ask you these screening questions? Yes Filed at: 09/05/2022 1457   Initial Alcohol Screen: US AUDIT-C     1  How often do you have a drink containing alcohol? 0 Filed at: 09/05/2022 1457   2  How many drinks containing alcohol do you have on a typical day you are drinking? 0 Filed at: 09/05/2022 1457   3a  Male UNDER 65: How often do you have five or more drinks on one occasion? 0 Filed at: 09/05/2022 1457   3b  FEMALE Any Age, or MALE 65+: How often do you have 4 or more drinks on one occassion? 0 Filed at: 09/05/2022 1457   Audit-C Score 0 Filed at: 09/05/2022 1457   SHAWN: How many times in the past year have you    Used an illegal drug or used a prescription medication for non-medical reasons?  Never Filed at: 09/05/2022 1457                    MDM  Number of Diagnoses or Management Options  Sciatica of left side: new and requires workup     Amount and/or Complexity of Data Reviewed  Independent visualization of images, tracings, or specimens: yes    Patient Progress  Patient progress: stable      Disposition  Final diagnoses:   Sciatica of left side     Time reflects when diagnosis was documented in both MDM as applicable and the Disposition within this note     Time User Action Codes Description Comment    9/5/2022  3:16 PM Joann Hyde [M54 [id number]] Sciatica of left side       ED Disposition     ED Disposition   Discharge    Condition   Stable    Date/Time   Mon Sep 5, 2022  3:16 PM    [address] discharge to home/self care                 Follow-up Information     Follow up With Specialties Details Why Contact Info Additional Information    [facility] Physical Therapy Go to   [phone]          Discharge Medication List as of 9/5/2022  3:19 PM      START taking these medications    Details   gabapentin (Neurontin) 100 mg capsule Take 1 capsule (100 mg total) by mouth 3 (three) times a day as needed (Sciatica pain) for up to 30 doses, Starting Mon 9/5/2022, Normal         CONTINUE these medications which have NOT CHANGED    Details   albuterol (2 5 mg/3 mL) 0 083 % nebulizer solution Take 3 mL (2 5 mg total) by nebulization every 6 (six) hours as needed for wheezing or shortness of breath, Starting Mon 4/4/2022, Normal      albuterol (PROVENTIL HFA,VENTOLIN HFA) 90 mcg/act inhaler Inhale 2 puffs every 6 (six) hours as needed for wheezing, Starting Tue 6/16/2020, Normal      alendronate (FOSAMAX) 70 mg tablet Take 1 tablet (70 mg total) by mouth every 7 days, Starting Fri 8/26/2022, Normal      amoxicillin (AMOXIL) 500 mg capsule TAKE 4 CAPSULES (2,000 MG TOTAL) BY MOUTH EVERY 8 (EIGHT) HOURS FOR 1 DAY, Historical Med      aspirin (ECOTRIN LOW STRENGTH) 81 mg EC tablet Take 1 tablet (81 mg total) by mouth daily, Starting Thu 10/11/2018, Print      b complex vitamins capsule Take 1 capsule by mouth 2 (two) times a day  , Historical Med      budesonide-formoterol (Symbicort) 160-4 5 mcg/act inhaler Inhale 2 puffs 2 (two) times a day, Starting Fri 7/29/2022, Normal      Calcium Carb-Cholecalciferol (CALCIUM 600 + D PO) Take 1 tablet by mouth 2 (two) times a day, Historical Med      Cranberry 250 MG TABS Take by mouth, Historical Med      Diclofenac Sodium (VOLTAREN) 1 % Apply 2 g topically 4 (four) times a day, Starting Fri 8/5/2022, Normal      Fesoterodine Fumarate ER (Toviaz) 8 MG TB24 Take 8 mg by mouth daily , Historical Med      furosemide (LASIX) 20 mg tablet TAKE 1 TABLET (20 MG TOTAL) BY MOUTH DAILY IN THE MORNING, Starting Wed 7/20/2022, Until Fri 8/19/2022, Normal      !! levothyroxine 50 mcg tablet Take 1 tablet (50 mcg total) by mouth daily, Starting Mon 7/25/2022, Normal      !! levothyroxine 50 mcg tablet TAKE 1 TABLET BY MOUTH EVERY DAY, Normal      mometasone (ELOCON) 0 1 % cream APPLY TO THE RIGHT EAR CANAL TWICE DAILY FOR 2 WEEKS, THEN DECREASE TO ONCE AT BEDTIME OR AS NEEDED, Normal      olmesartan (BENICAR) 5 mg tablet TAKE 1 TABLET BY MOUTH EVERY DAY, Normal      omeprazole (PriLOSEC) 40 MG capsule TAKE 1 CAPSULE BY MOUTH TWICE A DAY, Normal      oxybutynin (DITROPAN-XL) 10 MG 24 hr tablet Take 10 mg by mouth daily, Starting Tue 6/28/2022, Historical Med      sertraline (ZOLOFT) 100 mg tablet TAKE 1 TABLET BY MOUTH EVERY DAY, Normal      simvastatin (ZOCOR) 40 mg tablet Take 1 tablet (40 mg total) by mouth daily at bedtime, Starting Wed 8/3/2022, Normal       !! - Potential duplicate medications found  Please discuss with provider        STOP taking these medications       cyclobenzaprine (FLEXERIL) 5 mg tablet Comments:   Reason for Stopping:         meloxicam (Mobic) 15 mg tablet Comments:   Reason for Stopping:                   PDMP Review       Value Time User    PDMP Reviewed  Yes 9/10/2021  2:40 PM [address] [place] [address], [address] [place] [address] Provider  Electronically Signed by           MABEL Valles  09/05/22 5204

## 2022-09-06 ENCOUNTER — NURSE TRIAGE (OUTPATIENT)
Dept: PHYSICAL THERAPY | Facility: OTHER | Age: 82
End: 2022-09-06

## 2022-09-06 ENCOUNTER — HOSPITAL ENCOUNTER (OUTPATIENT)
Dept: RADIOLOGY | Facility: HOSPITAL | Age: 82
Discharge: HOME | End: 2022-09-06
Payer: MEDICARE

## 2022-09-06 ENCOUNTER — TELEPHONE (OUTPATIENT)
Dept: FAMILY MEDICINE CLINIC | Facility: CLINIC | Age: 82
End: 2022-09-06

## 2022-09-06 DIAGNOSIS — M54.42 ACUTE MIDLINE LOW BACK PAIN WITH LEFT-SIDED SCIATICA: ICD-10-CM

## 2022-09-06 DIAGNOSIS — M54.42 ACUTE MIDLINE LOW BACK PAIN WITH LEFT-SIDED SCIATICA: Primary | ICD-10-CM

## 2022-09-06 DIAGNOSIS — M54.50 ACUTE EXACERBATION OF CHRONIC LOW BACK PAIN: Primary | ICD-10-CM

## 2022-09-06 DIAGNOSIS — G89.29 ACUTE EXACERBATION OF CHRONIC LOW BACK PAIN: Primary | ICD-10-CM

## 2022-09-06 PROCEDURE — 72110 X-RAY EXAM L-2 SPINE 4/>VWS: CPT

## 2022-09-06 NOTE — TELEPHONE ENCOUNTER
Additional Information   Negative: Is this related to a work injury?  Negative: Is this related to an MVA?  Negative: Are you currently recieving homecare services? Background - Initial Assessment  Clinical complaint: Pain left low back, radiates down left buttock and down to below knee  Denies numbness and tingling  HX of this pain for "years", however pain got worse on 8/30, and went to PCP  [facility]   Was seen by PCP and ED, also is established with PM    Date of onset: last week for this flare  Frequency of pain: constant  Quality of pain: "It's just pain"    Protocols used: [facility] COMPREHENSIVE SPINE PROGRAM PROTOCOL

## 2022-09-06 NOTE — TELEPHONE ENCOUNTER
Additional Information   Negative: Has the patient had unexplained weight loss?  Negative: Does the patient have a fever?  Negative: Is the patient experiencing urine retention?  Negative: Is the patient experiencing acute drop foot or paralysis?  Negative: Has the patient experienced major trauma? (fall from height, high speed collision, direct blow to spine) and is also experiencing nausea, light-headedness, or loss of consciousness?  Negative: Is the patient experiencing blood in sputum?  Affirmative: Is this a chronic condition? Protocols used: SL AMB COMPREHENSIVE SPINE PROGRAM PROTOCOL    This RN did review in detail the [facility] and what we can provide for their back pain  Patient is agreeable to being triaged by this RN and would like to proceed with Physical Therapy  Referral was placed for Physical Therapy at the [facility] site  Patients information was sent to the  to make evaluation appointment  Patient made aware that the PT office  will be calling to schedule the appointment  Patient was provided with the phone number to the PT office  No further questions and/or concerns were voiced by the patient at this time  Patient states understanding of the referral that was placed  Referral Closed

## 2022-09-06 NOTE — TELEPHONE ENCOUNTER
She went to the ER yesterday for left sided sciatica pain and thought they would do an xray and they didn't  Can you order an xray for her?

## 2022-09-07 ENCOUNTER — TELEPHONE (OUTPATIENT)
Dept: PAIN MEDICINE | Facility: CLINIC | Age: 82
End: 2022-09-07

## 2022-09-07 NOTE — TELEPHONE ENCOUNTER
Pt is having pain in her back and left leg  Pain level is a 10/10  Pt went to PCP and she gave her cyclobenzaprine 5 mg and meloxicam 15 mg  Then went to the ER  PC sent her for a x-ray they gave her gabapentin 100 mg and none of them are working  The ER told her to go to phsical therapy  Pt would like to know what SP would suggest   Pt thinks that maybe she should get a injection and if he thinks Physical therapy would help      Pt # [phone]

## 2022-09-07 NOTE — TELEPHONE ENCOUNTER
wE DID do LESI about 1 year ago but there doesn't seem to be any follow up from the patient on how she did  At taht time it was left sided sciatica as the main symptom  If same symptoms and had releif with last injection, then ok to repeat   Otherwise OV

## 2022-09-07 NOTE — TELEPHONE ENCOUNTER
Please schedule repeat LESI done 8/2021  S/W pt who states it is the same pain as this time last year  Pain left side and going down leg  States she had about 60-70 percent relief and is agreeable to repeating

## 2022-09-09 ENCOUNTER — TELEPHONE (OUTPATIENT)
Dept: PAIN MEDICINE | Facility: MEDICAL CENTER | Age: 82
End: 2022-09-09

## 2022-09-09 ENCOUNTER — RA CDI HCC (OUTPATIENT)
Dept: OTHER | Facility: HOSPITAL | Age: 82
End: 2022-09-09

## 2022-09-09 DIAGNOSIS — M54.16 LUMBAR RADICULOPATHY: Primary | ICD-10-CM

## 2022-09-09 NOTE — TELEPHONE ENCOUNTER
Scheduled patient for 9/20/2 @ 2:00  Patient does take Aspirin 81mg  Nothing to eat or drink 1 hour prior to procedure  Needs to arrange transportation  Proper clothing for procedure  No vaccines 2 weeks prior or after procedure  If ill or place on antibiotics, please call to reschedule

## 2022-09-09 NOTE — TELEPHONE ENCOUNTER
S/w pt who was calling to schedule her LESI  This is already being addressed in task to surg coordinator    Pt aware she will be called next week to schedule

## 2022-09-09 NOTE — PROGRESS NOTES
[facility]  coding opportunities          Chart Reviewed number of suggestions sent to Provider: 1   [id number]    Patients Insurance     Medicare Insurance: [employer]

## 2022-09-15 ENCOUNTER — OFFICE VISIT (OUTPATIENT)
Dept: FAMILY MEDICINE CLINIC | Facility: CLINIC | Age: 82
End: 2022-09-15
Payer: MEDICARE

## 2022-09-15 VITALS
DIASTOLIC BLOOD PRESSURE: 60 MMHG | HEIGHT: 55 IN | BODY MASS INDEX: 43.83 KG/M2 | TEMPERATURE: 96.9 F | HEART RATE: 74 BPM | SYSTOLIC BLOOD PRESSURE: 102 MMHG | OXYGEN SATURATION: 95 % | WEIGHT: 189.4 LBS

## 2022-09-15 DIAGNOSIS — F33.9 DEPRESSION, RECURRENT (HCC): ICD-10-CM

## 2022-09-15 DIAGNOSIS — Z95.2 S/P TAVR (TRANSCATHETER AORTIC VALVE REPLACEMENT): ICD-10-CM

## 2022-09-15 DIAGNOSIS — M19.90 ARTHRITIS: Chronic | ICD-10-CM

## 2022-09-15 DIAGNOSIS — Z00.00 MEDICARE ANNUAL WELLNESS VISIT, SUBSEQUENT: Primary | ICD-10-CM

## 2022-09-15 DIAGNOSIS — M54.16 RADICULOPATHY, LUMBAR REGION: ICD-10-CM

## 2022-09-15 DIAGNOSIS — M54.42 ACUTE MIDLINE LOW BACK PAIN WITH LEFT-SIDED SCIATICA: ICD-10-CM

## 2022-09-15 DIAGNOSIS — Z78.0 POSTMENOPAUSAL: ICD-10-CM

## 2022-09-15 DIAGNOSIS — E78.2 MIXED HYPERLIPIDEMIA: Chronic | ICD-10-CM

## 2022-09-15 DIAGNOSIS — I10 PRIMARY HYPERTENSION: Chronic | ICD-10-CM

## 2022-09-15 DIAGNOSIS — E03.9 HYPOTHYROIDISM, UNSPECIFIED TYPE: Chronic | ICD-10-CM

## 2022-09-15 DIAGNOSIS — Z59.9 FINANCIAL DIFFICULTIES: ICD-10-CM

## 2022-09-15 PROBLEM — H66.90 CHRONIC OTITIS MEDIA: Status: RESOLVED | Noted: 2019-04-01 | Resolved: 2022-09-15

## 2022-09-15 PROCEDURE — G0439 PPPS, SUBSEQ VISIT: HCPCS | Performed by: NURSE PRACTITIONER

## 2022-09-15 PROCEDURE — 99214 OFFICE O/P EST MOD 30 MIN: CPT | Performed by: NURSE PRACTITIONER

## 2022-09-15 SDOH — ECONOMIC STABILITY - INCOME SECURITY: PROBLEM RELATED TO HOUSING AND ECONOMIC CIRCUMSTANCES, UNSPECIFIED: Z59.9

## 2022-09-15 NOTE — PROGRESS NOTES
Assessment and Plan:     Problem List Items Addressed This Visit        Endocrine    Hypothyroid (Chronic)     Will check with next set of labs  Cardiovascular and Mediastinum    Hypertension (Chronic)     BP well controlled today  Continue meds  Nervous and Auditory    Radiculopathy, lumbar region       Musculoskeletal and Integument    Arthritis (Chronic)       Other    Hyperlipidemia (Chronic)    S/P TAVR (transcatheter aortic valve replacement)    Depression, recurrent (HCC)     Well controlled with zoloft daily  Other Visit Diagnoses     Medicare annual wellness visit, subsequent    -  Primary    Financial difficulties        Acute midline low back pain with left-sided sciatica        Relevant Orders    Ambulatory Referral to Physical Therapy    Postmenopausal        Relevant Orders    DXA bone density spine hip and pelvis        BMI Counseling: Body mass index is 44 02 kg/m²  The BMI is above normal  Nutrition recommendations include decreasing portion sizes, encouraging healthy choices of fruits and vegetables and moderation in carbohydrate intake  Exercise recommendations include moderate physical activity 150 minutes/week and exercising 3-5 times per week  Rationale for BMI follow-up plan is due to patient being overweight or obese  Preventive health issues were discussed with patient, and age appropriate screening tests were ordered as noted in patient's After Visit Summary  Personalized health advice and appropriate referrals for health education or preventive services given if needed, as noted in patient's After Visit Summary  History of Present Illness:     Patient presents for a Medicare Wellness Visit    Patient presents for routine follow up and AWV  Declines COVID and flu vaccines  She continues with left sided sciatic pain  Motrin and tylenol help a little bit  Tried Meloxicam and Flexeril, states she had no relief at all   She canceled her PT appointment, not sure why? She has an appointment with pain management next week  She did lose 21 lbs since May  Patient Care Team:  Rubi Watt as PCP - General (Family Medicine)  Marjorie Tejeda MD as Endoscopist     Review of Systems:     Review of Systems   Constitutional: Negative for chills and fever  HENT: Negative for ear pain and sore throat  Eyes: Negative for pain and visual disturbance  Respiratory: Negative for cough and shortness of breath  Cardiovascular: Negative for chest pain and palpitations  Gastrointestinal: Negative for abdominal pain and vomiting  Genitourinary: Negative for dysuria and hematuria  Musculoskeletal: Positive for back pain (left side)  Negative for arthralgias  Skin: Negative for color change and rash  Neurological: Negative for seizures and syncope  All other systems reviewed and are negative         Problem List:     Patient Active Problem List   Diagnosis    Hypothyroid    Hypertension    Hyperlipidemia    Reactive airway disease without complication    JANICE (obstructive sleep apnea)    LVH (left ventricular hypertrophy)    Mitral annular calcification    Mitral valve stenosis    Pulmonary hypertension (HCC)    Chronic diastolic (congestive) heart failure (HCC)    Anemia    Obesity, morbid (HCC)    Gastroesophageal reflux disease without esophagitis    Arthritis    AVB (atrioventricular block)    COPD, mild (HCC)    Coronary artery disease    JANICE on CPAP    S/P TAVR (transcatheter aortic valve replacement)    Depression with anxiety    Insomnia    Osteopenia    Depression, recurrent (HCC)    Radiculopathy, lumbar region    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[address] )    Stage 3a chronic kidney disease ([address] )      Past Medical and Surgical History:     Past Medical History:   Diagnosis Date    Anemia 08/22/2018    Anxiety     Arthritis     AVB (atrioventricular block)     first degree    Cataract     CHF [facility]congestive heart failure) ([facility] )     COPD, mild (HCC)     Coronary artery disease     Dislocation of right shoulder joint     Frequent UTI     GERD (gastroesophageal reflux disease)     H/O: pneumonia     Heme positive stool     Hyperlipidemia     Hypertension     Hypothyroidism     Morbid obesity with BMI of 50 0-59 9, adult ([facility] )     Obesity, morbid ([facility] ) 08/22/2018    JANICE on CPAP     Pulmonary hypertension ([place] [id number] ) 08/22/2018    Severe aortic stenosis     Simple goiter     Skin cyst     within the armpits, right    Wears glasses      Past Surgical History:   Procedure Laterality Date    BREAST BIOPSY      CARDIAC CATHETERIZATION      CARPAL TUNNEL RELEASE Bilateral     CHOLECYSTECTOMY      DILATION AND CURETTAGE OF UTERUS      HYSTEROSCOPY      MASTOID SURGERY      IA COLONOSCOPY FLX DX W/COLLJ SPEC WHEN PFRMD N/A 9/6/2018    Procedure: COLONOSCOPY;  Surgeon: Vince Millan MD;  [facility]; Service: Gastroenterology    IA ECHO TRANSESOPHAG R-T 2D W/PRB IMG ACQUISJ I&R N/A 10/9/2018    Procedure: INTRA-OP TRANSESOPHAGEAL ECHOCARDIOGRAM (GARRISON); Surgeon: Beverley Wisdom DO;  [facility];  Service: Cardiac Surgery    IA ESOPHAGOGASTRODUODENOSCOPY TRANSORAL DIAGNOSTIC N/A 8/31/2018    Procedure: ESOPHAGOGASTRODUODENOSCOPY (EGD); Surgeon: Vince Millan MD;  [facility]; Service: Gastroenterology    IA REPLACE AORTIC VALVE OPENFEMORAL ARTERY APPROACH N/A 10/9/2018    Procedure: REPLACEMENT AORTIC VALVE TRANSCATHETER (TAVR) TRANSFEMORAL W/ 23 MM MENDOZA NOE S3 VALVE (ACCESS OF LEFT);   Surgeon: Beverley Wisdom DO;  [facility];  Service: Cardiac Surgery    TOTAL HIP ARTHROPLASTY Left 2007    TOTAL KNEE ARTHROPLASTY Bilateral       Family History:     Family History   Problem Relation Age of Onset    Diabetes Mother     Stroke Mother     Cancer Father     Lung cancer Father     Diabetes Sister     Heart disease Sister     Hypertension Sister  Coronary artery disease Family     Diabetes Family     Hypertension Family     Cancer Family     Stroke Family     Thyroid disease Neg Hx       Social History:     Social History     Socioeconomic History    Marital status: Single     Spouse name: None    Number of children: None    Years of education: None    Highest education level: None   Occupational History    Occupation: retired   Tobacco Use    Smoking status: Never Smoker    Smokeless tobacco: Never Used   Vaping Use    Vaping Use: Never used   Substance and Sexual Activity    Alcohol use: No    Drug use: No    Sexual activity: Never   Other Topics Concern    None   Social History Narrative    Denied drinking coffee    Denied exercise habits    Most recent tobacco use screenin2018      Do you currently or have you served in [employer]:   No      Were you activated, into active duty, as a member of the [employer] or as a Reservist:   No          Social Determinants of Health     Financial Resource Strain: Medium Risk    Difficulty of Paying Living Expenses: Somewhat hard   Food Insecurity: Not on file   Transportation Needs: No Transportation Needs    Lack of Transportation (Medical): No    Lack of Transportation (Non-Medical):  No   Physical Activity: Not on file   Stress: Not on file   Social Connections: Not on file   Intimate Partner Violence: Not on file   Housing Stability: Not on file      Medications and Allergies:     Current Outpatient Medications   Medication Sig Dispense Refill    albuterol (2 5 mg/3 mL) 0 083 % nebulizer solution Take 3 mL (2 5 mg total) by nebulization every 6 (six) hours as needed for wheezing or shortness of breath 360 mL 5    albuterol (PROVENTIL HFA,VENTOLIN HFA) 90 mcg/act inhaler Inhale 2 puffs every 6 (six) hours as needed for wheezing 1 Inhaler 3    alendronate (FOSAMAX) 70 mg tablet Take 1 tablet (70 mg total) by mouth every 7 days 12 tablet 1    aspirin (ECOTRIN LOW STRENGTH) 81 mg EC tablet Take 1 tablet (81 mg total) by mouth daily 100 tablet 0    b complex vitamins capsule Take 1 capsule by mouth 2 (two) times a day        budesonide-formoterol (Symbicort) 160-4 5 mcg/act inhaler Inhale 2 puffs 2 (two) times a day 10 2 g 3    Calcium Carb-Cholecalciferol (CALCIUM 600 + D PO) Take 1 tablet by mouth 2 (two) times a day      Cranberry 250 MG TABS Take by mouth      Diclofenac Sodium (VOLTAREN) 1 % Apply 2 g topically 4 (four) times a day 50 g 0    Fesoterodine Fumarate ER (Toviaz) 8 MG TB24 Take 8 mg by mouth daily       gabapentin (Neurontin) 100 mg capsule Take 1 capsule (100 mg total) by mouth 3 (three) times a day as needed (Sciatica pain) for up to 30 doses 30 capsule 0    levothyroxine 50 mcg tablet Take 1 tablet (50 mcg total) by mouth daily 30 tablet 0    mometasone (ELOCON) 0 1 % cream APPLY TO THE RIGHT EAR CANAL TWICE DAILY FOR 2 WEEKS, THEN DECREASE TO ONCE AT BEDTIME OR AS NEEDED 45 g 0    olmesartan (BENICAR) 5 mg tablet TAKE 1 TABLET BY MOUTH EVERY DAY 90 tablet 3    omeprazole (PriLOSEC) 40 MG capsule TAKE 1 CAPSULE BY MOUTH TWICE A  capsule 1    oxybutynin (DITROPAN-XL) 10 MG 24 hr tablet Take 10 mg by mouth daily      sertraline (ZOLOFT) 100 mg tablet TAKE 1 TABLET BY MOUTH EVERY DAY 90 tablet 0    simvastatin (ZOCOR) 40 mg tablet Take 1 tablet (40 mg total) by mouth daily at bedtime 90 tablet 1    furosemide (LASIX) 20 mg tablet TAKE 1 TABLET (20 MG TOTAL) BY MOUTH DAILY IN THE MORNING 90 tablet 3     No current facility-administered medications for this visit       Allergies   Allergen Reactions    Latex Rash    Neosporin [Neomycin-Bacitracin Zn-Polymyx] Rash and Other (See Comments)     hives per [facility] order      Immunizations:     Immunization History   Administered Date(s) Administered    COVID-19 PFIZER VACCINE 0 3 ML IM 03/16/2021, 04/07/2021    Pneumococcal Conjugate 13-Valent 06/19/2018    Pneumococcal Polysaccharide PPV23 07/01/2019      Health Maintenance: There are no preventive care reminders to display for this patient  There are no preventive care reminders to display for this patient  Medicare Screening Tests and Risk Assessments:     Myra Sacks is here for her Subsequent Wellness visit  Health Risk Assessment:   Patient rates overall health as fair  Patient feels that their physical health rating is slightly worse  Patient is very satisfied with their life  Eyesight was rated as same  Hearing was rated as same  Patient feels that their emotional and mental health rating is same  Patients states they are never, rarely angry  Patient states they are sometimes unusually tired/fatigued  Pain experienced in the last 7 days has been a lot  Patient's pain rating has been 10/10  Patient states that she has experienced no weight loss or gain in last 6 months  Fall Risk Screening: In the past year, patient has experienced: no history of falling in past year      Urinary Incontinence Screening:   Patient has leaked urine accidently in the last six months  Home Safety:  Patient has trouble with stairs inside or outside of their home  Patient has working smoke alarms and has no working carbon monoxide detector  Home safety hazards include: none  Nutrition:   Current diet is Regular  Medications:   Patient is not currently taking any over-the-counter supplements  Patient is able to manage medications  Activities of Daily Living (ADLs)/Instrumental Activities of Daily Living (IADLs):   Walk and transfer into and out of bed and chair?: Yes  Dress and groom yourself?: Yes    Bathe or shower yourself?: Yes    Feed yourself?  Yes  Do your laundry/housekeeping?: Yes  Manage your money, pay your bills and track your expenses?: Yes  Make your own meals?: Yes    Do your own shopping?: Yes    Previous Hospitalizations:   Any hospitalizations or ED visits within the last 12 months?: Yes    How many hospitalizations have you had in the last year?: 3-4    Advance Care Planning:   Living will: No      PREVENTIVE SCREENINGS      Cardiovascular Screening:    General: Screening Not Indicated and History Lipid Disorder      Diabetes Screening:     General: Screening Current and Screening Not Indicated      Colorectal Cancer Screening:     General: Screening Not Indicated      Breast Cancer Screening:     General: Screening Not Indicated      Cervical Cancer Screening:    General: Screening Not Indicated      Osteoporosis Screening:      Due for: Bone Density Ultrasound      Abdominal Aortic Aneurysm (AAA) Screening:        General: Screening Not Indicated      Lung Cancer Screening:     General: Screening Not Indicated      Hepatitis C Screening:    General: Patient Declines    Screening, Brief Intervention, and Referral to Treatment (SBIRT)    Screening  Typical number of drinks in a day: 0  Typical number of drinks in a week: 0  Interpretation: Low risk drinking behavior  Single Item Drug Screening:  How often have you used an illegal drug (including marijuana) or a prescription medication for non-medical reasons in the past year? never    Single Item Drug Screen Score: 0  Interpretation: Negative screen for possible drug use disorder    No exam data present     Physical Exam:     /60   Pulse 74   Temp (!) 96 9 °F (36 1 °C)   Ht 4' 7" (1 397 m)   Wt 85 9 kg (189 lb 6 4 oz)   SpO2 95%   BMI 44 02 kg/m²     Physical Exam  Vitals and nursing note reviewed  Constitutional:       General: She is not in acute distress  Appearance: She is well-developed  She is obese  HENT:      Head: Normocephalic and atraumatic  Eyes:      Conjunctiva/sclera: Conjunctivae normal    Cardiovascular:      Rate and Rhythm: Normal rate and regular rhythm  Heart sounds: No murmur heard  Pulmonary:      Effort: Pulmonary effort is normal  No respiratory distress  Breath sounds: Normal breath sounds     Musculoskeletal:      Cervical back: Neck supple  Lumbar back: Negative right straight leg raise test and negative left straight leg raise test    Skin:     General: Skin is warm and dry  Neurological:      Mental Status: She is alert and oriented to person, place, and time     Psychiatric:         Mood and Affect: Mood normal           MABEL Hallman

## 2022-09-15 NOTE — PATIENT INSTRUCTIONS
Medicare Preventive Visit Patient Instructions  Thank you for completing your Welcome to Medicare Visit or Medicare Annual Wellness Visit today  Your next wellness visit will be due in one year (9/16/2023)  The screening/preventive services that you may require over the next 5-10 years are detailed below  Some tests may not apply to you based off risk factors and/or age  Screening tests ordered at today's visit but not completed yet may show as past due  Also, please note that scanned in results may not display below  Preventive Screenings:  Service Recommendations Previous Testing/Comments   Colorectal Cancer Screening  * Colonoscopy    * Fecal Occult Blood Test (FOBT)/Fecal Immunochemical Test (FIT)  * Fecal DNA/Cologuard Test  * Flexible Sigmoidoscopy Age: 39-70 years old   Colonoscopy: every 10 years (may be performed more frequently if at higher risk)  OR  FOBT/FIT: every 1 year  OR  Cologuard: every 3 years  OR  Sigmoidoscopy: every 5 years  Screening may be recommended earlier than age 39 if at higher risk for colorectal cancer  Also, an individualized decision between you and your healthcare provider will decide whether screening between the ages of 74-80 would be appropriate  Colonoscopy: Not on file  FOBT/FIT: 08/16/2021  Cologuard: Not on file  Sigmoidoscopy: Not on file          Breast Cancer Screening Age: 36 years old  Frequency: every 1-2 years  Not required if history of left and right mastectomy Mammogram: 03/01/2017        Cervical Cancer Screening Between the ages of 21-29, pap smear recommended once every 3 years  Between the ages of 33-67, can perform pap smear with HPV co-testing every 5 years     Recommendations may differ for women with a history of total hysterectomy, cervical cancer, or abnormal pap smears in past  Pap Smear: Not on file    Screening Not Indicated   Hepatitis C Screening Once for adults born between [facility]  More frequently in patients at high risk for Hepatitis C Hep C Antibody: Not on file        Diabetes Screening 1-2 times per year if you're at risk for diabetes or have pre-diabetes Fasting glucose: 90 mg/dL (8/1/2022)  A1C: 5 7 % (9/27/2018)  Screening Current   Cholesterol Screening Once every 5 years if you don't have a lipid disorder  May order more often based on risk factors  Lipid panel: 08/01/2022    Screening Not Indicated  History Lipid Disorder     Other Preventive Screenings Covered by Medicare:  1  Abdominal Aortic Aneurysm (AAA) Screening: covered once if your at risk  You're considered to be at risk if you have a family history of AAA  2  Lung Cancer Screening: covers low dose CT scan once per year if you meet all of the following conditions: (1) Age 50-69; (2) No signs or symptoms of lung cancer; (3) Current smoker or have quit smoking within the last 15 years; (4) You have a tobacco smoking history of at least 20 pack years (packs per day multiplied by number of years you smoked); (5) You get a written order from a healthcare provider  3  Glaucoma Screening: covered annually if you're considered high risk: (1) You have diabetes OR (2) Family history of glaucoma OR (3)  aged 48 and older OR (3)  American aged 72 and older  3  Osteoporosis Screening: covered every 2 years if you meet one of the following conditions: (1) You're estrogen deficient and at risk for osteoporosis based off medical history and other findings; (2) Have a vertebral abnormality; (3) On glucocorticoid therapy for more than 3 months; (4) Have primary hyperparathyroidism; (5) On osteoporosis medications and need to assess response to drug therapy  · Last bone density test (DXA Scan): 08/20/2020   5  HIV Screening: covered annually if you're between the age of 15-65  Also covered annually if you are younger than 13 and older than 72 with risk factors for HIV infection   For pregnant patients, it is covered up to 3 times per pregnancy  Immunizations:  Immunization Recommendations   Influenza Vaccine Annual influenza vaccination during flu season is recommended for all persons aged >= 6 months who do not have contraindications   Pneumococcal Vaccine   * Pneumococcal conjugate vaccine = PCV13 (Prevnar 13), PCV15 (Vaxneuvance), PCV20 (Prevnar 20)  * Pneumococcal polysaccharide vaccine = PPSV23 (Pneumovax) Adults 25-60 years old: 1-3 doses may be recommended based on certain risk factors  Adults 72 years old: 1-2 doses may be recommended based off what pneumonia vaccine you previously received   Hepatitis B Vaccine 3 dose series if at intermediate or high risk (ex: diabetes, end stage renal disease, liver disease)   Tetanus (Td) Vaccine - COST NOT COVERED BY MEDICARE PART B Following completion of primary series, a booster dose should be given every 10 years to maintain immunity against tetanus  Td may also be given as tetanus wound prophylaxis  Tdap Vaccine - COST NOT COVERED BY MEDICARE PART B Recommended at least once for all adults  For pregnant patients, recommended with each pregnancy  Shingles Vaccine (Shingrix) - COST NOT COVERED BY MEDICARE PART B  2 shot series recommended in those aged 48 and above     Health Maintenance Due:  There are no preventive care reminders to display for this patient  Immunizations Due:      Topic Date Due    COVID-19 Vaccine (3 - Booster for Pfizer series) 09/07/2021    Influenza Vaccine (1) 09/01/2022     Advance Directives   What are advance directives? Advance directives are legal documents that state your wishes and plans for medical care  These plans are made ahead of time in case you lose your ability to make decisions for yourself  Advance directives can apply to any medical decision, such as the treatments you want, and if you want to donate organs  What are the types of advance directives? There are many types of advance directives, and each state has rules about how to use them  You may choose a combination of any of the following:  · Living will: This is a written record of the treatment you want  You can also choose which treatments you do not want, which to limit, and which to stop at a certain time  This includes surgery, medicine, IV fluid, and tube feedings  · Durable power of  for healthcare [facility]): This is a written record that states who you want to make healthcare choices for you when you are unable to make them for yourself  This person, called a proxy, is usually a family member or a friend  You may choose more than 1 proxy  · Do not resuscitate (DNR) order:  A DNR order is used in case your heart stops beating or you stop breathing  It is a request not to have certain forms of treatment, such as CPR  A DNR order may be included in other types of advance directives  · Medical directive: This covers the care that you want if you are in a coma, near death, or unable to make decisions for yourself  You can list the treatments you want for each condition  Treatment may include pain medicine, surgery, blood transfusions, dialysis, IV or tube feedings, and a ventilator (breathing machine)  · Values history: This document has questions about your views, beliefs, and how you feel and think about life  This information can help others choose the care that you would choose  Why are advance directives important? An advance directive helps you control your care  Although spoken wishes may be used, it is better to have your wishes written down  Spoken wishes can be misunderstood, or not followed  Treatments may be given even if you do not want them  An advance directive may make it easier for your family to make difficult choices about your care  Urinary Incontinence   Urinary incontinence (UI)  is when you lose control of your bladder  UI develops because your bladder cannot store or empty urine properly   The 3 most common types of UI are stress incontinence, urge incontinence, or both  Medicines:   · May be given to help strengthen your bladder control  Report any side effects of medication to your healthcare provider  Do pelvic muscle exercises often:  Your pelvic muscles help you stop urinating  Squeeze these muscles tight for 5 seconds, then relax for 5 seconds  Gradually work up to squeezing for 10 seconds  Do 3 sets of 15 repetitions a day, or as directed  This will help strengthen your pelvic muscles and improve bladder control  Train your bladder:  Go to the bathroom at set times, such as every 2 hours, even if you do not feel the urge to go  You can also try to hold your urine when you feel the urge to go  For example, hold your urine for 5 minutes when you feel the urge to go  As that becomes easier, hold your urine for 10 minutes  Self-care:   · Keep a UI record  Write down how often you leak urine and how much you leak  Make a note of what you were doing when you leaked urine  · Drink liquids as directed  You may need to limit the amount of liquid you drink to help control your urine leakage  Do not drink any liquid right before you go to bed  Limit or do not have drinks that contain caffeine or alcohol  · Prevent constipation  Eat a variety of high-fiber foods  Good examples are high-fiber cereals, beans, vegetables, and whole-grain breads  Walking is the best way to trigger your intestines to have a bowel movement  · Exercise regularly and maintain a healthy weight  Weight loss and exercise will decrease pressure on your bladder and help you control your leakage  · Use a catheter as directed  to help empty your bladder  A catheter is a tiny, plastic tube that is put into your bladder to drain your urine  · Go to behavior therapy as directed  Behavior therapy may be used to help you learn to control your urge to urinate      Weight Management   Why it is important to manage your weight:  Being overweight increases your risk of health conditions such as heart disease, high blood pressure, type 2 diabetes, and certain types of cancer  It can also increase your risk for osteoarthritis, sleep apnea, and other respiratory problems  Aim for a slow, steady weight loss  Even a small amount of weight loss can lower your risk of health problems  How to lose weight safely:  A safe and healthy way to lose weight is to eat fewer calories and get regular exercise  You can lose up about 1 pound a week by decreasing the number of calories you eat by 500 calories each day  Healthy meal plan for weight management:  A healthy meal plan includes a variety of foods, contains fewer calories, and helps you stay healthy  A healthy meal plan includes the following:  · Eat whole-grain foods more often  A healthy meal plan should contain fiber  Fiber is the part of grains, fruits, and vegetables that is not broken down by your body  Whole-grain foods are healthy and provide extra fiber in your diet  Some examples of whole-grain foods are whole-wheat breads and pastas, oatmeal, brown rice, and bulgur  · Eat a variety of vegetables every day  Include dark, leafy greens such as spinach, kale, marlo greens, and mustard greens  Eat yellow and orange vegetables such as carrots, sweet potatoes, and winter squash  · Eat a variety of fruits every day  Choose fresh or canned fruit (canned in its own juice or light syrup) instead of juice  Fruit juice has very little or no fiber  · Eat low-fat dairy foods  Drink fat-free (skim) milk or 1% milk  Eat fat-free yogurt and low-fat cottage cheese  Try low-fat cheeses such as mozzarella and other reduced-fat cheeses  · Choose meat and other protein foods that are low in fat  Choose beans or other legumes such as split peas or lentils  Choose fish, skinless poultry (chicken or turkey), or lean cuts of red meat (beef or pork)  Before you cook meat or poultry, cut off any visible fat  · Use less fat and oil    Try baking foods instead of frying them  Add less fat, such as margarine, sour cream, regular salad dressing and mayonnaise to foods  Eat fewer high-fat foods  Some examples of high-fat foods include french fries, doughnuts, ice cream, and cakes  · Eat fewer sweets  Limit foods and drinks that are high in sugar  This includes candy, cookies, regular soda, and sweetened drinks  Exercise:  Exercise at least 30 minutes per day on most days of the week  Some examples of exercise include walking, biking, dancing, and swimming  You can also fit in more physical activity by taking the stairs instead of the elevator or parking farther away from stores  Ask your healthcare provider about the best exercise plan for you  © Copyright [employer] 2018 Information is for End User's use only and may not be sold, redistributed or otherwise used for commercial purposes   All illustrations and images included in CareNotes® are the copyrighted property of A D A M , Inc  or [address] [place] [address]

## 2022-09-20 ENCOUNTER — HOSPITAL ENCOUNTER (OUTPATIENT)
Dept: RADIOLOGY | Facility: CLINIC | Age: 82
Discharge: HOME | End: 2022-09-20
Payer: MEDICARE

## 2022-09-20 VITALS
SYSTOLIC BLOOD PRESSURE: 128 MMHG | OXYGEN SATURATION: 93 % | RESPIRATION RATE: 18 BRPM | DIASTOLIC BLOOD PRESSURE: 62 MMHG | TEMPERATURE: 97.5 F | HEART RATE: 55 BPM

## 2022-09-20 DIAGNOSIS — M54.16 LUMBAR RADICULOPATHY: ICD-10-CM

## 2022-09-20 PROCEDURE — 62323 NJX INTERLAMINAR LMBR/SAC: CPT | Performed by: STUDENT IN AN ORGANIZED HEALTH CARE EDUCATION/TRAINING PROGRAM

## 2022-09-20 RX ORDER — METHYLPREDNISOLONE ACETATE 80 MG/ML
80 INJECTION, SUSPENSION INTRA-ARTICULAR; INTRALESIONAL; INTRAMUSCULAR; PARENTERAL; SOFT TISSUE ONCE
Status: COMPLETED | OUTPATIENT
Start: 2022-09-20 | End: 2022-09-20

## 2022-09-20 RX ADMIN — METHYLPREDNISOLONE ACETATE 80 MG: 80 INJECTION, SUSPENSION INTRA-ARTICULAR; INTRALESIONAL; INTRAMUSCULAR; PARENTERAL; SOFT TISSUE at 14:25

## 2022-09-20 RX ADMIN — IOHEXOL 1 ML: 300 INJECTION, SOLUTION INTRAVENOUS at 14:20

## 2022-09-20 NOTE — DISCHARGE INSTR - LAB
Epidural Steroid Injection   WHAT YOU NEED TO KNOW:   An epidural steroid injection (SYMONE) is a procedure to inject steroid medicine into the epidural space  The epidural space is between your spinal cord and vertebrae  Steroids reduce inflammation and fluid buildup in your spine that may be causing pain  You may be given pain medicine along with the steroids  ACTIVITY  Do not drive or operate machinery today  No strenuous activity today - bending, lifting, etc   You may resume normal activites starting tomorrow - start slowly and as tolerated  You may shower today, but no tub baths or hot tubs  You may have numbness for several hours from the local anesthetic  Please use caution and common sense, especially with weight-bearing activities  CARE OF THE INJECTION SITE  If you have soreness or pain, apply ice to the area today (20 minutes on/20 minutes off)  Starting tomorrow, you may use warm, moist heat or ice if needed  You may have an increase or change in your discomfort for 36-48 hours after your treatment  Apply ice and continue with any pain medication you have been prescribed  Notify the [facility] if you have any of the following: redness, drainage, swelling, headache, stiff neck or fever above 100°F     SPECIAL INSTRUCTIONS  Our office will contact you in approximately 7 days for a progress report  MEDICATIONS  Continue to take all routine medications  Our office may have instructed you to hold some medications  As no general anesthesia was used in today's procedure, you should not experience any side effects related to anesthesia  If you are diabetic, the steroids used in today's injection may temporarily increase your blood sugar levels after the first few days after your injection  Please keep a close eye on your sugars and alert the doctor who manages your diabetes if your sugars are significantly high from your baseline or you are symptomatic       If you have a problem specifically related to your procedure, please call our office at [phone]  Problems not related to your procedure should be directed to your primary care physician

## 2022-09-20 NOTE — H&P
History of Present Illness:  The patient is a 79 y o  female who presents with complaints of back and leg pain    Patient Active Problem List   Diagnosis    Hypothyroid    Hypertension    Hyperlipidemia    Reactive airway disease without complication    JANICE (obstructive sleep apnea)    LVH (left ventricular hypertrophy)    Mitral annular calcification    Mitral valve stenosis    Pulmonary hypertension (HCC)    Chronic diastolic (congestive) heart failure (HCC)    Anemia    Obesity, morbid (HCC)    Gastroesophageal reflux disease without esophagitis    Arthritis    AVB (atrioventricular block)    COPD, mild (HCC)    Coronary artery disease    JANICE on CPAP    S/P TAVR (transcatheter aortic valve replacement)    Depression with anxiety    Insomnia    Osteopenia    Depression, recurrent (HCC)    Radiculopathy, lumbar region    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[facility] [address]    Stage 3a chronic kidney disease ([facility] [address] )       Past Medical History:   Diagnosis Date    Anemia 08/22/2018    Anxiety     Arthritis     AVB (atrioventricular block)     first degree    Cataract     CHF (congestive heart failure) (HCC)     COPD, mild (HCC)     Coronary artery disease     Dislocation of right shoulder joint     Frequent UTI     GERD (gastroesophageal reflux disease)     H/O: pneumonia     Heme positive stool     Hyperlipidemia     Hypertension     Hypothyroidism     Morbid obesity with BMI of 50 0-59 9, adult (HCC)     Obesity, morbid ([address] ) 08/22/2018    JANICE on CPAP     Pulmonary hypertension ([address] ) 08/22/2018    Severe aortic stenosis     Simple goiter     Skin cyst     within the armpits, right    Wears glasses        Past Surgical History:   Procedure Laterality Date    BREAST BIOPSY      CARDIAC CATHETERIZATION      CARPAL TUNNEL RELEASE Bilateral     CHOLECYSTECTOMY      DILATION AND CURETTAGE OF UTERUS      HYSTEROSCOPY      MASTOID SURGERY      MS COLONOSCOPY FLX DX W/COLLJ SPEC WHEN PFRMD N/A 9/6/2018    Procedure: COLONOSCOPY;  Surgeon: Alex Cox MD;  Location: [facility]; Service: Gastroenterology    CA ECHO TRANSESOPHAG R-T 2D W/PRB IMG LIZBETHMARVINJ I&R N/A 10/9/2018    Procedure: INTRA-OP TRANSESOPHAGEAL ECHOCARDIOGRAM (GARRISON); Surgeon: Kash Santillan DO;  [facility];  Service: Cardiac Surgery    CA ESOPHAGOGASTRODUODENOSCOPY TRANSORAL DIAGNOSTIC N/A 8/31/2018    Procedure: ESOPHAGOGASTRODUODENOSCOPY (EGD); Surgeon: Alex Cox MD;  [facility]: [facility]; Service: Gastroenterology    CA REPLACE AORTIC VALVE OPENFEMORAL ARTERY APPROACH N/A 10/9/2018    Procedure: REPLACEMENT AORTIC VALVE TRANSCATHETER (TAVR) TRANSFEMORAL W/ 23 MM MENDOZA NOE S3 VALVE (ACCESS OF LEFT);   Surgeon: Kash Santillan DO;  [facility];  Service: Cardiac Surgery    TOTAL HIP ARTHROPLASTY Left 2007    TOTAL KNEE ARTHROPLASTY Bilateral          Current Outpatient Medications:     albuterol (2 5 mg/3 mL) 0 083 % nebulizer solution, Take 3 mL (2 5 mg total) by nebulization every 6 (six) hours as needed for wheezing or shortness of breath, Disp: 360 mL, Rfl: 5    albuterol (PROVENTIL HFA,VENTOLIN HFA) 90 mcg/act inhaler, Inhale 2 puffs every 6 (six) hours as needed for wheezing, Disp: 1 Inhaler, Rfl: 3    alendronate (FOSAMAX) 70 mg tablet, Take 1 tablet (70 mg total) by mouth every 7 days, Disp: 12 tablet, Rfl: 1    aspirin (ECOTRIN LOW STRENGTH) 81 mg EC tablet, Take 1 tablet (81 mg total) by mouth daily, Disp: 100 tablet, Rfl: 0    b complex vitamins capsule, Take 1 capsule by mouth 2 (two) times a day  , Disp: , Rfl:     budesonide-formoterol (Symbicort) 160-4 5 mcg/act inhaler, Inhale 2 puffs 2 (two) times a day, Disp: 10 2 g, Rfl: 3    Calcium Carb-Cholecalciferol (CALCIUM 600 + D PO), Take 1 tablet by mouth 2 (two) times a day, Disp: , Rfl:     Cranberry 250 MG TABS, Take by mouth, Disp: , Rfl:     Diclofenac Sodium (VOLTAREN) 1 %, Apply 2 g topically 4 (four) times a day, Disp: 50 g, Rfl: 0    Fesoterodine Fumarate ER (Toviaz) 8 MG TB24, Take 8 mg by mouth daily , Disp: , Rfl:     furosemide (LASIX) 20 mg tablet, TAKE 1 TABLET (20 MG TOTAL) BY MOUTH DAILY IN THE MORNING, Disp: 90 tablet, Rfl: 3    gabapentin (Neurontin) 100 mg capsule, Take 1 capsule (100 mg total) by mouth 3 (three) times a day as needed (Sciatica pain) for up to 30 doses, Disp: 30 capsule, Rfl: 0    levothyroxine 50 mcg tablet, Take 1 tablet (50 mcg total) by mouth daily, Disp: 30 tablet, Rfl: 0    mometasone (ELOCON) 0 1 % cream, APPLY TO THE RIGHT EAR CANAL TWICE DAILY FOR 2 WEEKS, THEN DECREASE TO ONCE AT BEDTIME OR AS NEEDED, Disp: 45 g, Rfl: 0    olmesartan (BENICAR) 5 mg tablet, TAKE 1 TABLET BY MOUTH EVERY DAY, Disp: 90 tablet, Rfl: 3    omeprazole (PriLOSEC) 40 MG capsule, TAKE 1 CAPSULE BY MOUTH TWICE A DAY, Disp: 180 capsule, Rfl: 1    oxybutynin (DITROPAN-XL) 10 MG 24 hr tablet, Take 10 mg by mouth daily, Disp: , Rfl:     sertraline (ZOLOFT) 100 mg tablet, TAKE 1 TABLET BY MOUTH EVERY DAY, Disp: 90 tablet, Rfl: 0    simvastatin (ZOCOR) 40 mg tablet, Take 1 tablet (40 mg total) by mouth daily at bedtime, Disp: 90 tablet, Rfl: 1    Allergies   Allergen Reactions    Latex Rash    Neosporin [Neomycin-Bacitracin Zn-Polymyx] Rash and Other (See Comments)     hives per [facility] order       Physical Exam:   Vitals:    09/20/22 1403   BP: 146/80   Pulse: 66   Resp: 18   Temp: 97 5 °F (36 4 °C)   SpO2: 94%     General: Awake, Alert, Oriented x 3, Mood and affect appropriate  Respiratory: Respirations even and unlabored  Cardiovascular: Peripheral pulses intact; no edema  Musculoskeletal Exam: SLR neg    ASA Score: 3    Patient/Chart Verification  Patient ID Verified: Verbal  ID Band Applied: No  Consents Confirmed: Procedural, To be obtained in the Pre-Procedure area  Allergies Reviewed: Yes  Anticoag/NSAID held?: No  Currently on antibiotics?: No    Assessment:   1   Lumbar radiculopathy Plan: [place]

## 2022-09-27 ENCOUNTER — EVALUATION (OUTPATIENT)
Dept: PHYSICAL THERAPY | Facility: CLINIC | Age: 82
End: 2022-09-27
Payer: MEDICARE

## 2022-09-27 ENCOUNTER — TELEPHONE (OUTPATIENT)
Dept: PAIN MEDICINE | Facility: CLINIC | Age: 82
End: 2022-09-27

## 2022-09-27 DIAGNOSIS — M54.42 ACUTE MIDLINE LOW BACK PAIN WITH LEFT-SIDED SCIATICA: ICD-10-CM

## 2022-09-27 PROCEDURE — 97161 PT EVAL LOW COMPLEX 20 MIN: CPT

## 2022-09-27 NOTE — PROGRESS NOTES
PT Evaluation     Today's date: 2022  Patient name: Sarwat Fisher  : 1940  MRN: [id number]  Referring provider: MABEL Spencer  Dx:   Encounter Diagnosis     ICD-10-CM    1  Acute midline low back pain with left-sided sciatica  M54 42 Ambulatory Referral to Physical Therapy     PT plan of care cert/re-cert       Start Time: 1415  Stop Time: 1458  Total time in clinic (min): 43 minutes    Assessment  Assessment details: Pt is a 79 y o  female presenting to PT services with c/o chronic LBP with L sided radicular symptoms  Pt has tenderness to palpation along L lumbar paraspinals  Pt has impaired activation of transversus abdominis and multifidi musculature  Pt has impaired BLE strength  Pt has pain with function  PT adjusted height of pt's RW to be lower in order to improve posture and safety  PT notes increased thoracic kyphosis and sway back  Pt responded favorably to repeated lumbar extension with decreased intensity of pain  PT will assess 2MWT NV  PT added TA activation, repeated lumbar extension, and rows to pt's HEP, pt verbalized and demonstrated understanding of proper form  Pt is a good candidate for skilled physical therapy in order to improve core activation, decrease pain, improve balance, safety and functional ability  Impairments: abnormal gait, abnormal or restricted ROM, activity intolerance, impaired balance, impaired physical strength, lacks appropriate home exercise program, pain with function, safety issue and poor posture     Goals  STG (3 weeks):  1  Pt will improve b/l quadriceps strength to be at least 4+/5  2  Pt will improve b/l hip flexion strength to be at least 4/5   3  Pt will report pain at best to be 3/10 or less     LTG (6 weeks):  1  Pt will be independent in HEP  2  Pt will improve activation of transverse abdominis evidenced by palpable contraction while breathing   3  Pt will report no radicular symptoms into L left within the last week  4   Pt will improve 2MWT distance by 50 feet to demonstrate improved endurance and gait speed  Plan  Patient would benefit from: skilled physical therapy  Planned modality interventions: biofeedback, cryotherapy, TENS, thermotherapy: hydrocollator packs, unattended electrical stimulation and traction  Other planned modality interventions: IASTM, MFDc  Planned therapy interventions: manual therapy, joint mobilization, home exercise program, therapeutic exercise, therapeutic activities, stretching, strengthening, patient education, neuromuscular re-education, massage, abdominal trunk stabilization, balance, gait training, functional ROM exercises and flexibility  Frequency: 1x week  Duration in weeks: 6  Plan of Care beginning date: 2022  Plan of Care expiration date: 2022  Treatment plan discussed with: patient        Subjective Evaluation    History of Present Illness  Mechanism of injury: Pt states that she has had back pain that went down her L leg  She went to pain management last Tuesday and it seems to have helped a bit, but not completely  She has had injections before and they seemed to have helped  Pain  Current pain ratin  At best pain ratin  At worst pain ratin  Location: L low back and LLE  Quality: nagging  Alleviating factors: hot shower, Tylenol prn, Ibuprofen prn    Aggravating factors: sitting, standing and walking (bending over)  Progression: improved    Social Support  Steps to enter house: no (ramp)  Stairs in house: yes (basement stairs, but does not use basement)   L[facility] in: [facility]  Lives with: alone (1 medium sized dog (Melida Band))    Employment status: not working  Hand dominance: right      Diagnostic Tests  X-ray: abnormal (severe OA lumbar spine)  Treatments  Previous treatment: physical therapy, injection treatment and medication  Patient Goals  Patient goals for therapy: increased strength, improved balance, decreased pain and increased motion  Patient goal: "to be able to walk without a walker "        Objective     Palpation   Left   Tenderness of the lumbar paraspinals  Mechanical Assessment    Cervical      Thoracic      Lumbar    Standing extension: repeated movements  Pain intensity: better  Pain level: decreased    Strength/Myotome Testing     Left Hip   Planes of Motion   Flexion: 4    Right Hip   Planes of Motion   Flexion: 4    Left Knee   Flexion: 4+  Extension: 4-    Right Knee   Flexion: 4+  Extension: 4-    Left Ankle/Foot   Dorsiflexion: 4+  Plantar flexion: 4+    Right Ankle/Foot   Dorsiflexion: 4+  Plantar flexion: 4+    Muscle Activation   Patient unable to activate left transverse abdominals and right transverse abdominals  Precautions: Access Code: [email or web address]  URL: [email or web address]         Manuals 9/27                                                                Neuro Re-Ed 9/27            TA contraction 5"x20 seated            Rows RTB 3x10            Lat pull down                                                                 Ther Ex 9/27            Bike             Repeated lumbar extension 3x10 standing            Paloff press             Hip abduction             Hip extension             Wall slides lumbar ext             LAQ                          Ther Activity 9/27            Step up fwd             Step up lat              Gait Training 9/27                                      Modalities 9/27

## 2022-10-03 ENCOUNTER — APPOINTMENT (OUTPATIENT)
Dept: PHYSICAL THERAPY | Facility: CLINIC | Age: 82
End: 2022-10-03

## 2022-10-04 ENCOUNTER — OFFICE VISIT (OUTPATIENT)
Dept: PAIN MEDICINE | Facility: CLINIC | Age: 82
End: 2022-10-04
Payer: MEDICARE

## 2022-10-04 VITALS
BODY MASS INDEX: 43.46 KG/M2 | HEART RATE: 52 BPM | SYSTOLIC BLOOD PRESSURE: 115 MMHG | WEIGHT: 187 LBS | DIASTOLIC BLOOD PRESSURE: 68 MMHG

## 2022-10-04 DIAGNOSIS — M54.16 LUMBAR RADICULOPATHY: ICD-10-CM

## 2022-10-04 DIAGNOSIS — M54.42 CHRONIC BILATERAL LOW BACK PAIN WITH LEFT-SIDED SCIATICA: ICD-10-CM

## 2022-10-04 DIAGNOSIS — Z79.891 LONG-TERM CURRENT USE OF OPIATE ANALGESIC: ICD-10-CM

## 2022-10-04 DIAGNOSIS — F11.20 UNCOMPLICATED OPIOID DEPENDENCE (HCC): ICD-10-CM

## 2022-10-04 DIAGNOSIS — G89.29 CHRONIC BILATERAL LOW BACK PAIN WITH LEFT-SIDED SCIATICA: ICD-10-CM

## 2022-10-04 DIAGNOSIS — G89.4 CHRONIC PAIN SYNDROME: Primary | ICD-10-CM

## 2022-10-04 PROCEDURE — 99214 OFFICE O/P EST MOD 30 MIN: CPT

## 2022-10-04 PROCEDURE — 80305 DRUG TEST PRSMV DIR OPT OBS: CPT

## 2022-10-04 NOTE — PATIENT INSTRUCTIONS

## 2022-10-04 NOTE — PROGRESS NOTES
Pain Medicine Follow-Up Note    Assessment:  1  Chronic pain syndrome    2  Lumbar radiculopathy    3  Chronic bilateral low back pain with left-sided sciatica    4  Uncomplicated opioid dependence ([address] )    5  Long-term current use of opiate analgesic        Plan:  Orders Placed This Encounter   Procedures    MM ALL_Prescribed Meds and Special Instructions     Order Specific Question:   [facility] Is ALBUTEROL prescribed? Answer:   Yes     Order Specific Question:   [facility] Is GABAPENTIN prescribed? Answer:   Yes     Order Specific Question:   [facility] Is OMEPRAZOLE prescribed? Answer:   Yes     Order Specific Question:   [facility] Is SERTRALINE Prescribed? Answer:    Yes    MM DT_Alprazolam Definitive Test    MM DT_Amphetamine Definitive Test    MM DT_Buprenorphine Definitive Test    MM DT_Butalbital Definitive Test    MM DT_Clonazepam Definitive Test    MM DT_Cocaine Definitive Test    MM DT_Codeine Definitive Test    MM DT_Dextromethorphan Definitive Test    MM Diazepam Definitive Test    MM DT_Ethyl Glucuronide/Ethyl Sulfate Definitive Test    MM DT_Fentanyl Definitive Test    MM DT_Heroin Definitive Test    MM DT_Hydrocodone Definitive Test    MM DT_Hydromorphone Definitive Test    MM DT_Kratom Definitive Test    MM DT_Levorphanol Definitive Test    MM Lorazepam Definitive Test    MM DT_MDMA Definitive Test    MM DT_Meperidine Definitive Test    MM DT_Methadone Definitive Test    MM DT_Methamphetamine Definitive Test    MM DT_Methylphenidate Definitive Test    MM DT_Morphine Definitive Test    MM DT_Oxazepam Definitive Test    MM DT_Oxycodone Definitive Test    MM DT_Oxymorphone Definitive Test    MM DT_Phencyclidine Definitive Test    MM DT_Phenobarbital Definitive Test    MM DT_Phentermine Definitive Test    MM DT_Secobarbital Definitive Test    MM DT_Spice Definitive Test    MM DT_Tapentadol Definitive Test    MM DT_Temazapam Definitive Test    MM DT_THC Definitive Test    MM DT_Tramadol Definitive Test       No orders of the defined types were placed in this encounter  My impressions and treatment recommendations were discussed in detail with the patient who verbalized understanding and had no further questions  At today's visit patient presents the office stating that her pain is worse with a pain score of 7/10 on the verbal numeric pain scale at this time the patient states that her pain is in her right shoulder and it is causing pain all the time and is it is especially excruciating when she moves her right shoulder  Patient has undergone multiple injections in her right shoulder as well as her low back  Patient was educated on the use steroid injections and advised that she should only undergo them every 3 months her last injection in her shoulder was 08/17/2022 and the last 1 in her low back was on 09/20/2022  The patient unfortunately is not a surgical candidate therefore opioid therapy is appropriate  Patient is willing to try anything that may help reduce her pain  Opioid agreement was initiated today along with a urine drug screen  After the results come back I will E prescribed a prescription to the patient's pharmacy of tramadol 50 mg take 1 tablet daily as needed for severe pain  [place] Prescription Drug Monitoring Program report was reviewed and was appropriate     A urine drug screen was collected at today's office visit as part of our medication management protocol  The point of care testing results were appropriate for what was being prescribed  The specimen will be sent for confirmatory testing  The drug screen is medically necessary because the patient is either dependent on opioid medication or is being considered for opioid medication therapy and the results could impact ongoing or future treatment   The drug screen is to evaluate for the presences or absence of prescribed, non-prescribed, and/or illicit drugs/substances  There are risks associated with opioid medications, including dependence, addiction and tolerance  The patient understands and agrees to use these medications only as prescribed  Potential side effects of the medications include, but are not limited to, constipation, drowsiness, addiction, impaired judgment and risk of fatal overdose if not taken as prescribed  The patient was warned against driving while taking sedation medications  Sharing medications is a felony  At this point in time, the patient is showing no signs of addiction, abuse, diversion or suicidal ideation  Follow-up is planned in 4 weeks time or sooner as warranted  Discharge instructions were provided  I personally saw and examined the patient and I agree with the above discussed plan of care  History of Present Illness:    Savana Martinez is a 79 y o  female who presents to [facility] for interval re-evaluation of the above stated pain complaints  The patient has a past medical and chronic pain history as outlined in the assessment section  She was last seen on 9/20/2022  At today's visit patient states that her pain is worse with a pain score of 7/10 on the verbal numeric pain scale  Patient did undergo a epidural injection in her low back that did provide her with 50% improvement of her pain symptoms  Patient states that her pain is constant in nature  The quality of the patient's pain is sharp and bothers her all the time  Patient indicates that her primary source of pain today is in her right shoulder  Pain Contract Signed:  10/4/2022  Last Urine Drug Screen:  10/4/2022    Other than as stated above, the patient denies any interval changes in medications, medical condition, mental condition, symptoms, or allergies since the last office visit  Review of Systems:    Review of Systems   Respiratory: Negative for shortness of breath  Cardiovascular: Negative for chest pain  Gastrointestinal: Negative for constipation, diarrhea, nausea and vomiting  Musculoskeletal: Negative for arthralgias, gait problem, joint swelling and myalgias  Decreased ROM   Skin: Negative for rash  Neurological: Negative for dizziness, seizures and weakness  All other systems reviewed and are negative          Patient Active Problem List   Diagnosis    Hypothyroid    Hypertension    Hyperlipidemia    Reactive airway disease without complication    JANICE (obstructive sleep apnea)    LVH (left ventricular hypertrophy)    Mitral annular calcification    Mitral valve stenosis    Pulmonary hypertension (HCC)    Chronic diastolic (congestive) heart failure (HCC)    Anemia    Obesity, morbid (HCC)    Gastroesophageal reflux disease without esophagitis    Arthritis    AVB (atrioventricular block)    COPD, mild (HCC)    Coronary artery disease    JANICE on CPAP    S/P TAVR (transcatheter aortic valve replacement)    Depression with anxiety    Insomnia    Osteopenia    Depression, recurrent ([facility])    Radiculopathy, lumbar region    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[facility] )    Stage 3a chronic kidney disease ([facility] )       Past Medical History:   Diagnosis Date    Anemia 08/22/2018    Anxiety     Arthritis     AVB (atrioventricular block)     first degree    Cataract     CHF (congestive heart failure) (HCC)     COPD, mild (HCC)     Coronary artery disease     Dislocation of right shoulder joint     Frequent UTI     GERD (gastroesophageal reflux disease)     H/O: pneumonia     Heme positive stool     Hyperlipidemia     Hypertension     Hypothyroidism     Morbid obesity with BMI of 50 0-59 9, adult ([facility])     Obesity, morbid ([facility] ) 08/22/2018    JANICE on CPAP     Pulmonary hypertension ([facility]) 08/22/2018    Severe aortic stenosis     Simple goiter     Skin cyst     within the armpits, right    Wears glasses        Past Surgical History:   Procedure Laterality Date    BREAST BIOPSY      CARDIAC CATHETERIZATION      CARPAL TUNNEL RELEASE Bilateral     CHOLECYSTECTOMY      DILATION AND CURETTAGE OF UTERUS      HYSTEROSCOPY      MASTOID SURGERY      MA COLONOSCOPY FLX DX W/COLLJ SPEC WHEN PFRMD N/A 9/6/2018    Procedure: COLONOSCOPY;  Surgeon: Malia Florentino MD;  [facility]: [facility]; Service: Gastroenterology    MA ECHO TRANSESOPHAG R-T 2D W/PRB IMG ACQUISJ I&R N/A 10/9/2018    Procedure: INTRA-OP TRANSESOPHAGEAL ECHOCARDIOGRAM (GARRISON); Surgeon: Dane Kirby DO;  Location:  MAIN OR;  Service: Cardiac Surgery    MA ESOPHAGOGASTRODUODENOSCOPY TRANSORAL DIAGNOSTIC N/A 8/31/2018    Procedure: ESOPHAGOGASTRODUODENOSCOPY (EGD); Surgeon: Malia Florentino MD;  [facility]; Service: Gastroenterology    MA REPLACE AORTIC VALVE OPENFEMORAL ARTERY APPROACH N/A 10/9/2018    Procedure: REPLACEMENT AORTIC VALVE TRANSCATHETER (TAVR) TRANSFEMORAL W/ 23 MM MENDOZA NOE S3 VALVE (ACCESS OF LEFT);   Surgeon: Dane Kirby DO;  [facility];  Service: Cardiac Surgery    TOTAL HIP ARTHROPLASTY Left 2007    TOTAL KNEE ARTHROPLASTY Bilateral        Family History   Problem Relation Age of Onset    Diabetes Mother     Stroke Mother     Cancer Father     Lung cancer Father     Diabetes Sister     Heart disease Sister     Hypertension Sister     Coronary artery disease Family     Diabetes Family     Hypertension Family     Cancer Family     Stroke Family     Thyroid disease Neg Hx        Social History     Occupational History    Occupation: retired   Tobacco Use    Smoking status: Never Smoker    Smokeless tobacco: Never Used   Vaping Use    Vaping Use: Never used   Substance and Sexual Activity    Alcohol use: No    Drug use: No    Sexual activity: Never         Current Outpatient Medications:     albuterol (2 5 mg/3 mL) 0 083 % nebulizer solution, Take 3 mL (2 5 mg total) by nebulization every 6 (six) hours as needed for wheezing or shortness of breath (Patient taking differently: Take 2 5 mg by nebulization every 6 (six) hours as needed for wheezing or shortness of breath PRN), Disp: 360 mL, Rfl: 5    albuterol (PROVENTIL HFA,VENTOLIN HFA) 90 mcg/act inhaler, Inhale 2 puffs every 6 (six) hours as needed for wheezing, Disp: 1 Inhaler, Rfl: 3    alendronate (FOSAMAX) 70 mg tablet, Take 1 tablet (70 mg total) by mouth every 7 days, Disp: 12 tablet, Rfl: 1    aspirin (ECOTRIN LOW STRENGTH) 81 mg EC tablet, Take 1 tablet (81 mg total) by mouth daily, Disp: 100 tablet, Rfl: 0    b complex vitamins capsule, Take 1 capsule by mouth 2 (two) times a day  , Disp: , Rfl:     budesonide-formoterol (Symbicort) 160-4 5 mcg/act inhaler, Inhale 2 puffs 2 (two) times a day, Disp: 10 2 g, Rfl: 3    Calcium Carb-Cholecalciferol (CALCIUM 600 + D PO), Take 1 tablet by mouth 2 (two) times a day, Disp: , Rfl:     Cranberry 250 MG TABS, Take by mouth, Disp: , Rfl:     Diclofenac Sodium (VOLTAREN) 1 %, Apply 2 g topically 4 (four) times a day, Disp: 50 g, Rfl: 0    Fesoterodine Fumarate ER (Toviaz) 8 MG TB24, Take 8 mg by mouth daily , Disp: , Rfl:     gabapentin (Neurontin) 100 mg capsule, Take 1 capsule (100 mg total) by mouth 3 (three) times a day as needed (Sciatica pain) for up to 30 doses, Disp: 30 capsule, Rfl: 0    levothyroxine 50 mcg tablet, Take 1 tablet (50 mcg total) by mouth daily, Disp: 30 tablet, Rfl: 0    mometasone (ELOCON) 0 1 % cream, APPLY TO THE RIGHT EAR CANAL TWICE DAILY FOR 2 WEEKS, THEN DECREASE TO ONCE AT BEDTIME OR AS NEEDED, Disp: 45 g, Rfl: 0    olmesartan (BENICAR) 5 mg tablet, TAKE 1 TABLET BY MOUTH EVERY DAY, Disp: 90 tablet, Rfl: 3    omeprazole (PriLOSEC) 40 MG capsule, TAKE 1 CAPSULE BY MOUTH TWICE A DAY, Disp: 180 capsule, Rfl: 1    oxybutynin (DITROPAN-XL) 10 MG 24 hr tablet, Take 10 mg by mouth daily, Disp: , Rfl:     sertraline (ZOLOFT) 100 mg tablet, TAKE 1 TABLET BY MOUTH EVERY DAY, Disp: 90 tablet, Rfl: 0    simvastatin (ZOCOR) 40 mg tablet, Take 1 tablet (40 mg total) by mouth daily at bedtime, Disp: 90 tablet, Rfl: 1    furosemide (LASIX) 20 mg tablet, TAKE 1 TABLET (20 MG TOTAL) BY MOUTH DAILY IN THE MORNING, Disp: 90 tablet, Rfl: 3    Allergies   Allergen Reactions    Latex Rash    Neosporin [Neomycin-Bacitracin Zn-Polymyx] Rash and Other (See Comments)     hives per [facility] order       Physical Exam:    /68 (BP Location: Left arm, Patient Position: Sitting, Cuff Size: Standard)   Pulse (!) 52   Wt 84 8 kg (187 lb)   BMI 43 46 kg/m²     Constitutional:normal, well developed, well nourished, alert, in no distress and non-toxic and no overt pain behavior   and obese  Eyes:anicteric  HEENT:grossly intact  Neck:supple, symmetric, trachea midline and no masses   Pulmonary:even and unlabored  Cardiovascular:No edema or pitting edema present  Skin:Normal without rashes or lesions and well hydrated  Psychiatric:Mood and affect appropriate  Neurologic:Cranial Nerves II-XII grossly intact  Musculoskeletal:antalgic, shuffling, stooped posture and Ambulates with a Rollator      Orders Placed This Encounter   Procedures    MM ALL_Prescribed Meds and Special Instructions    MM DT_Alprazolam Definitive Test    MM DT_Amphetamine Definitive Test    MM DT_Buprenorphine Definitive Test    MM DT_Butalbital Definitive Test    MM DT_Clonazepam Definitive Test    MM DT_Cocaine Definitive Test    MM DT_Codeine Definitive Test    MM DT_Dextromethorphan Definitive Test    MM Diazepam Definitive Test    MM DT_Ethyl Glucuronide/Ethyl Sulfate Definitive Test    MM DT_Fentanyl Definitive Test    MM DT_Heroin Definitive Test    MM DT_Hydrocodone Definitive Test    MM DT_Hydromorphone Definitive Test    MM DT_Kratom Definitive Test    MM DT_Levorphanol Definitive Test    MM Lorazepam Definitive Test    MM DT_MDMA Definitive Test    MM DT_Meperidine Definitive Test    MM DT_Methadone Definitive Test    MM DT_Methamphetamine Definitive Test    MM DT_Methylphenidate Definitive Test    MM DT_Morphine Definitive Test    MM DT_Oxazepam Definitive Test    MM DT_Oxycodone Definitive Test    MM DT_Oxymorphone Definitive Test    MM DT_Phencyclidine Definitive Test    MM DT_Phenobarbital Definitive Test    MM DT_Phentermine Definitive Test    MM DT_Secobarbital Definitive Test    MM DT_Spice Definitive Test    MM DT_Tapentadol Definitive Test    MM DT_Temazapam Definitive Test    MM DT_THC Definitive Test    MM DT_Tramadol Definitive Test

## 2022-10-06 ENCOUNTER — OFFICE VISIT (OUTPATIENT)
Dept: PHYSICAL THERAPY | Facility: CLINIC | Age: 82
End: 2022-10-06
Payer: MEDICARE

## 2022-10-06 DIAGNOSIS — M54.50 ACUTE EXACERBATION OF CHRONIC LOW BACK PAIN: ICD-10-CM

## 2022-10-06 DIAGNOSIS — M54.42 ACUTE MIDLINE LOW BACK PAIN WITH LEFT-SIDED SCIATICA: Primary | ICD-10-CM

## 2022-10-06 DIAGNOSIS — G89.29 ACUTE EXACERBATION OF CHRONIC LOW BACK PAIN: ICD-10-CM

## 2022-10-06 PROCEDURE — 97110 THERAPEUTIC EXERCISES: CPT

## 2022-10-06 PROCEDURE — 97112 NEUROMUSCULAR REEDUCATION: CPT

## 2022-10-06 NOTE — PROGRESS NOTES
Daily Note     Today's date: 10/6/2022  Patient name: Shaan Raphael  : 1940  MRN: [id number]  Referring provider: MABEL Devries  Dx:   Encounter Diagnosis     ICD-10-CM    1  Acute midline low back pain with left-sided sciatica  M54 42    2  Acute exacerbation of chronic low back pain  M54 50     G89 29                   Subjective: Pt reports her LBP is the same and still sore across the entire back  She reports the rows at home do cause her shoulder to hurt  Objective: See treatment diary below      Assessment: Pt education regarding posture was provided  Poor postural awareness and requires multiple VC throughout session to improve this  Demonstrates fatigue in LE post session  No change in low back sx post session  Pt encouraged to incorporate movement and posture correction frequently throughout the day to help with healing and strength  She demonstrated verbal understanding  Plan: Continue per plan of care  Precautions: Access Code: [email or web address]  URL: [email or web address]         Manuals 9/27 10/6                                                               Neuro Re-Ed             TA contraction 5"x20 seated Supine 3"x20           Rows RTB 3x10            Lat pull down             Posture correct  x20           scap retraction  3"x20           Seated march  2x10                        Ther Ex             Bike             Repeated lumbar extension 3x10 standing 2x10           Paloff press             Hip abduction             Hip extension             Wall slides lumbar ext             LAQ  x20           bridges  x10           Ther Activity             Step up fwd             Step up lat              Gait Training                                       Modalities

## 2022-10-08 LAB
6MAM UR QL CFM: NEGATIVE NG/ML
7AMINOCLONAZEPAM UR QL CFM: NEGATIVE NG/ML
A-OH ALPRAZ UR QL CFM: NEGATIVE NG/ML
AMPHET UR QL CFM: NEGATIVE NG/ML
AMPHET UR QL CFM: NEGATIVE NG/ML
BUPRENORPHINE UR QL CFM: NEGATIVE NG/ML
BUTALBITAL UR QL CFM: NEGATIVE NG/ML
BZE UR QL CFM: NEGATIVE NG/ML
CODEINE UR QL CFM: NEGATIVE NG/ML
EDDP UR QL CFM: NEGATIVE NG/ML
ETHYL GLUCURONIDE UR QL CFM: NEGATIVE NG/ML
ETHYL SULFATE UR QL SCN: NEGATIVE NG/ML
FENTANYL UR QL CFM: NEGATIVE NG/ML
GLIADIN IGG SER IA-ACNC: NEGATIVE NG/ML
HYDROCODONE UR QL CFM: NEGATIVE NG/ML
HYDROCODONE UR QL CFM: NEGATIVE NG/ML
HYDROMORPHONE UR QL CFM: NEGATIVE NG/ML
LORAZEPAM UR QL CFM: NEGATIVE NG/ML
MDMA UR QL CFM: NEGATIVE NG/ML
ME-PHENIDATE UR QL CFM: NEGATIVE NG/ML
MEPERIDINE UR QL CFM: NEGATIVE NG/ML
METHADONE UR QL CFM: NEGATIVE NG/ML
METHAMPHET UR QL CFM: NEGATIVE NG/ML
MORPHINE UR QL CFM: NEGATIVE NG/ML
MORPHINE UR QL CFM: NEGATIVE NG/ML
NORBUPRENORPHINE UR QL CFM: NEGATIVE NG/ML
NORDIAZEPAM UR QL CFM: NEGATIVE NG/ML
NORFENTANYL UR QL CFM: NEGATIVE NG/ML
NORHYDROCODONE UR QL CFM: NEGATIVE NG/ML
NORHYDROCODONE UR QL CFM: NEGATIVE NG/ML
NORMEPERIDINE UR QL CFM: NEGATIVE NG/ML
NOROXYCODONE UR QL CFM: NEGATIVE NG/ML
OXAZEPAM UR QL CFM: NEGATIVE NG/ML
OXYCODONE UR QL CFM: NEGATIVE NG/ML
OXYMORPHONE UR QL CFM: NEGATIVE NG/ML
OXYMORPHONE UR QL CFM: NEGATIVE NG/ML
PARA-FLUOROFENTANYL QUANTIFICATION: NORMAL NG/ML
PCP UR QL CFM: NEGATIVE NG/ML
PHENOBARB UR QL CFM: NEGATIVE NG/ML
RESULT ALL_PRESCRIBED MEDS AND SPECIAL INSTRUCTIONS: NORMAL
SECOBARBITAL UR QL CFM: NEGATIVE NG/ML
SL AMB 4-ANPP QUANTIFICATION: NORMAL NG/ML
SL AMB 5F-ADB-M7 METABOLITE QUANTIFICATION: NEGATIVE NG/ML
SL AMB 7-OH-MITRAGYNINE (KRATOM ALKALOID) QUANTIFICATION: NEGATIVE NG/ML
SL AMB AB-FUBINACA-M3 METABOLITE QUANTIFICATION: NEGATIVE NG/ML
SL AMB ACETYL FENTANYL QUANTIFICATION: NORMAL NG/ML
SL AMB ACETYL NORFENTANYL QUANTIFICATION: NORMAL NG/ML
SL AMB ACRYL FENTANYL QUANTIFICATION: NORMAL NG/ML
SL AMB CARFENTANIL QUANTIFICATION: NORMAL NG/ML
SL AMB CTHC (MARIJUANA METABOLITE) QUANTIFICATION: NEGATIVE NG/ML
SL AMB DEXTROMETHORPHAN QUANTIFICATION: NEGATIVE NG/ML
SL AMB DEXTRORPHAN (DEXTROMETHORPHAN METABOLITE) QUANT: NEGATIVE NG/ML
SL AMB DEXTRORPHAN (DEXTROMETHORPHAN METABOLITE) QUANT: NEGATIVE NG/ML
SL AMB JWH018 METABOLITE QUANTIFICATION: NEGATIVE NG/ML
SL AMB JWH073 METABOLITE QUANTIFICATION: NEGATIVE NG/ML
SL AMB MDMB-FUBINACA-M1 METABOLITE QUANTIFICATION: NEGATIVE NG/ML
SL AMB N-DESMETHYL-TRAMADOL QUANTIFICATION: NEGATIVE NG/ML
SL AMB PHENTERMINE QUANTIFICATION: NEGATIVE NG/ML
SL AMB RCS4 METABOLITE QUANTIFICATION: NEGATIVE NG/ML
SL AMB RITALINIC ACID QUANTIFICATION: NEGATIVE NG/ML
SPECIMEN DRAWN SERPL: NEGATIVE NG/ML
TAPENTADOL UR QL CFM: NEGATIVE NG/ML
TEMAZEPAM UR QL CFM: NEGATIVE NG/ML
TEMAZEPAM UR QL CFM: NEGATIVE NG/ML
TRAMADOL UR QL CFM: NEGATIVE NG/ML
URATE/CREAT 24H UR: NEGATIVE NG/ML

## 2022-10-10 ENCOUNTER — OFFICE VISIT (OUTPATIENT)
Dept: PHYSICAL THERAPY | Facility: CLINIC | Age: 82
End: 2022-10-10
Payer: MEDICARE

## 2022-10-10 ENCOUNTER — TELEPHONE (OUTPATIENT)
Dept: PAIN MEDICINE | Facility: CLINIC | Age: 82
End: 2022-10-10

## 2022-10-10 DIAGNOSIS — M54.16 RADICULOPATHY, LUMBAR REGION: Primary | ICD-10-CM

## 2022-10-10 DIAGNOSIS — G89.29 ACUTE EXACERBATION OF CHRONIC LOW BACK PAIN: ICD-10-CM

## 2022-10-10 DIAGNOSIS — M54.50 ACUTE EXACERBATION OF CHRONIC LOW BACK PAIN: ICD-10-CM

## 2022-10-10 DIAGNOSIS — M54.42 ACUTE MIDLINE LOW BACK PAIN WITH LEFT-SIDED SCIATICA: Primary | ICD-10-CM

## 2022-10-10 PROCEDURE — 97110 THERAPEUTIC EXERCISES: CPT

## 2022-10-10 PROCEDURE — 97112 NEUROMUSCULAR REEDUCATION: CPT

## 2022-10-10 RX ORDER — TRAMADOL HYDROCHLORIDE 50 MG/1
50 TABLET ORAL DAILY PRN
Qty: 30 TABLET | Refills: 1 | Status: SHIPPED | OUTPATIENT
Start: 2022-10-10 | End: 2023-01-16

## 2022-10-10 NOTE — TELEPHONE ENCOUNTER
Caller: patient    Doctor: audrey    Reason for call: Urine drug screen test status for medication    Call back#: [phone]

## 2022-10-10 NOTE — TELEPHONE ENCOUNTER
Tramadol 50 mg take 1 tablet daily as needed for moderate pain sent to the patient's [facility] pharmacy.       Thank you

## 2022-10-17 ENCOUNTER — OFFICE VISIT (OUTPATIENT)
Dept: PHYSICAL THERAPY | Facility: CLINIC | Age: 82
End: 2022-10-17
Payer: MEDICARE

## 2022-10-17 DIAGNOSIS — G89.29 ACUTE EXACERBATION OF CHRONIC LOW BACK PAIN: ICD-10-CM

## 2022-10-17 DIAGNOSIS — M54.42 ACUTE MIDLINE LOW BACK PAIN WITH LEFT-SIDED SCIATICA: Primary | ICD-10-CM

## 2022-10-17 DIAGNOSIS — M54.50 ACUTE EXACERBATION OF CHRONIC LOW BACK PAIN: ICD-10-CM

## 2022-10-17 PROCEDURE — 97110 THERAPEUTIC EXERCISES: CPT

## 2022-10-17 PROCEDURE — 97112 NEUROMUSCULAR REEDUCATION: CPT

## 2022-10-17 NOTE — PROGRESS NOTES
Daily Note     Today's date: 10/17/2022  Patient name: Joni Akbar  : 1940  MRN: [id number]  Referring provider: MABEL Tan  Dx:   Encounter Diagnosis     ICD-10-CM    1  Acute midline low back pain with left-sided sciatica  M54 42    2  Acute exacerbation of chronic low back pain  M54 50     G89 29                   Subjective: Pt reports her low back pain has been intermittent since last visit  She notes her R shoulder is bothering her more lately  She notes her back prevents her from sweeping and scrubbing floors  Objective: See treatment diary below      Assessment: Hamstring dominant for standing hip extension  Decreased tolerance to WB on LLE during standing exercises  Decreased postural awareness remains  Pt able to complete charted exercises without c/o pain or discomfort  She would benefit from continued PT in order to improve b/l LE strength and core/postural strength for improved function during daily activities  Plan: Continue per plan of care  Precautions: Access Code: [email or web address]  URL: [email or web address]         Manuals 9/27 10/6 10/10 10/17                                                             Neuro Re-Ed 9/27  10/10          TA contraction 5"x20 seated Supine 3"x20  3"x20         Rows RTB 3x10  RTB 2x10          Lat pull down             Posture correct  x20           scap retraction  3"x20 3"x20 3"x20         Seated march  2x10 2x10 2x10                      Ther Ex   10/10          Bike             Repeated lumbar extension 3x10 standing 2x10 3x10 x20         Paloff press   RTB 2x10 ea YTB x10 ea (GH p!)         Hip abduction   2x10 ea 2x10 ea         Hip extension   2x10 ea x10 ea         Wall slides lumbar ext             LAQ  x20 x20 1# x20 ea         bridges  x10           Mini squats   2x10 2x10         Hamstring stretch   3x30" 30"x3         Hip adductor stretch   3x30" 30"x2         Ther Activity   10/10          Step up fwd Step up lat              Gait Training 9/27  10/10                                    Modalities 9/27  10/10

## 2022-10-19 ENCOUNTER — HOSPITAL ENCOUNTER (OUTPATIENT)
Dept: BONE DENSITY | Facility: CLINIC | Age: 82
Discharge: HOME | End: 2022-10-19
Payer: MEDICARE

## 2022-10-19 DIAGNOSIS — Z78.0 POSTMENOPAUSAL: ICD-10-CM

## 2022-10-19 PROCEDURE — 77080 DXA BONE DENSITY AXIAL: CPT

## 2022-10-20 DIAGNOSIS — E03.9 ACQUIRED HYPOTHYROIDISM: ICD-10-CM

## 2022-10-20 DIAGNOSIS — K21.00 GASTROESOPHAGEAL REFLUX DISEASE WITH ESOPHAGITIS: ICD-10-CM

## 2022-10-20 RX ORDER — LEVOTHYROXINE SODIUM 0.05 MG/1
TABLET ORAL
Qty: 30 TABLET | Refills: 0 | Status: SHIPPED | OUTPATIENT
Start: 2022-10-20

## 2022-10-20 RX ORDER — OMEPRAZOLE 40 MG/1
CAPSULE, DELAYED RELEASE ORAL
Qty: 180 CAPSULE | Refills: 1 | Status: SHIPPED | OUTPATIENT
Start: 2022-10-20

## 2022-10-24 ENCOUNTER — APPOINTMENT (OUTPATIENT)
Dept: PHYSICAL THERAPY | Facility: CLINIC | Age: 82
End: 2022-10-24

## 2022-10-26 ENCOUNTER — OFFICE VISIT (OUTPATIENT)
Dept: FAMILY MEDICINE CLINIC | Facility: CLINIC | Age: 82
End: 2022-10-26
Payer: MEDICARE

## 2022-10-26 VITALS
RESPIRATION RATE: 18 BRPM | SYSTOLIC BLOOD PRESSURE: 120 MMHG | HEART RATE: 80 BPM | HEIGHT: 55 IN | OXYGEN SATURATION: 93 % | BODY MASS INDEX: 43.46 KG/M2 | DIASTOLIC BLOOD PRESSURE: 60 MMHG | TEMPERATURE: 101.1 F

## 2022-10-26 DIAGNOSIS — R51.9 ACUTE NONINTRACTABLE HEADACHE, UNSPECIFIED HEADACHE TYPE: ICD-10-CM

## 2022-10-26 DIAGNOSIS — N30.01 ACUTE CYSTITIS WITH HEMATURIA: ICD-10-CM

## 2022-10-26 DIAGNOSIS — I10 PRIMARY HYPERTENSION: Chronic | ICD-10-CM

## 2022-10-26 DIAGNOSIS — R50.9 FEVER, UNSPECIFIED FEVER CAUSE: Primary | ICD-10-CM

## 2022-10-26 DIAGNOSIS — N39.41 URGE INCONTINENCE OF URINE: ICD-10-CM

## 2022-10-26 PROBLEM — R32 URINARY INCONTINENCE: Status: ACTIVE | Noted: 2022-10-26

## 2022-10-26 LAB
SL AMB  POCT GLUCOSE, UA: ABNORMAL
SL AMB LEUKOCYTE ESTERASE,UA: 500
SL AMB POCT BILIRUBIN,UA: ABNORMAL
SL AMB POCT BLOOD,UA: 10
SL AMB POCT CLARITY,UA: ABNORMAL
SL AMB POCT COLOR,UA: YELLOW
SL AMB POCT KETONES,UA: ABNORMAL
SL AMB POCT NITRITE,UA: ABNORMAL
SL AMB POCT PH,UA: 6
SL AMB POCT SPECIFIC GRAVITY,UA: 1.01
SL AMB POCT URINE PROTEIN: ABNORMAL
SL AMB POCT UROBILINOGEN: ABNORMAL

## 2022-10-26 PROCEDURE — 81002 URINALYSIS NONAUTO W/O SCOPE: CPT

## 2022-10-26 PROCEDURE — 99214 OFFICE O/P EST MOD 30 MIN: CPT

## 2022-10-26 RX ORDER — KETOROLAC TROMETHAMINE 30 MG/ML
30 INJECTION, SOLUTION INTRAMUSCULAR; INTRAVENOUS ONCE
Status: DISCONTINUED | OUTPATIENT
Start: 2022-10-26 | End: 2022-10-26

## 2022-10-26 RX ORDER — ACETAMINOPHEN 500 MG
1000 TABLET ORAL ONCE
Status: COMPLETED | OUTPATIENT
Start: 2022-10-26 | End: 2022-10-26

## 2022-10-26 RX ORDER — CIPROFLOXACIN 500 MG/1
500 TABLET, FILM COATED ORAL EVERY 12 HOURS SCHEDULED
Qty: 20 TABLET | Refills: 0 | Status: SHIPPED | OUTPATIENT
Start: 2022-10-26 | End: 2022-11-05

## 2022-10-26 RX ADMIN — Medication 1000 MG: at 15:11

## 2022-10-26 NOTE — ASSESSMENT & PLAN NOTE
Stay hydrated, may take Tylenol every 6 hours of to 3 g a day  Obtain blood work and chest x-ray  If you develop shortness of breath, chest pain, increasing fevers report to emergency room for further evaluation  Follow-up if no improvement in 1 week or sooner if symptoms worsen

## 2022-10-26 NOTE — PATIENT INSTRUCTIONS
Urinary Tract Infection in Older Adults   AMBULATORY CARE:   A urinary tract infection  (UTI) is caused by bacteria that get inside your urinary tract  Your urinary tract includes your kidneys, ureters, bladder, and urethra  Urine is made in your kidneys, and it flows from the ureters to the bladder  Urine leaves the bladder through the urethra  A UTI is more common in your lower urinary tract, which includes your bladder and urethra  Common signs and symptoms include the following:   Fever and chills    Pain or burning when you urinate    Urine that smells bad or looks cloudy, or blood in your urine    Urinating more often or waking from sleep to urinate    Sudden, strong need to urinate    Pain or pressure in your lower abdomen     Leaking urine    Confusion or agitation    Fatigue, shakiness, and weakness    Seek care immediately if:   You are urinating very little or not at all  You are vomiting  You have a high fever with shaking chills  You have side or back pain that gets worse  Call your doctor if:   You have a fever  You are a woman and you have increased white or yellow discharge from your vagina  You do not feel better after 2 days of taking antibiotics  You have questions or concerns about your condition or care  Treatment:  Medicines treat the bacterial infection or decrease pain and burning when you urinate  You may also need medicines to decrease the urge to urinate often  If you have UTIs often (called recurrent UTIs), you may be given antibiotics to take regularly  You will be given directions for when and how to use antibiotics  The goal is to prevent UTIs but not cause antibiotic resistance by using antibiotics too often  Self-care:   Drink liquids as directed  Liquids can help flush bacteria from your urinary tract  Ask how much liquid to drink each day and which liquids are best for you  You may need to drink more liquids than usual to help flush out the bacteria  Do not drink alcohol, caffeine, and citrus juices  These can irritate your bladder and increase your symptoms  Apply heat  on your abdomen for 20 to 30 minutes every 2 hours for as many days as directed  Heat helps decrease discomfort and pressure in your bladder  Prevent a UTI:   Urinate when you feel the urge  Do not hold your urine  Bacteria can grow if urine stays in the bladder too long  It may be helpful to urinate at least every 3 to 4 hours  Urinate after you have sex  to flush away bacteria that can enter your urinary tract during sex  Wear cotton underwear and clothes that are loose  Tight pants and nylon underwear can trap moisture and cause bacteria to grow  Cranberry juice or cranberry supplements  may help prevent UTIs  Your healthcare provider can recommend the right juice or supplement for you  Women should wipe front to back  after urinating or having a bowel movement  This may prevent germs from getting into the urinary tract  Do not douche or use feminine deodorants  These can change the chemical balance in your vagina  You may also be given vaginal estrogen medicine  This medicine helps prevent recurrent UTIs in women who have gone through menopause or are in kamari-menopause  Follow up with your doctor as directed:  Write down your questions so you remember to ask them during your visits  © Copyright [employer] 2022 Information is for End User's use only and may not be sold, redistributed or otherwise used for commercial purposes  All illustrations and images included in CareNotes® are the copyrighted property of A D A M , Inc  or [employer]   The above information is an  only  It is not intended as medical advice for individual conditions or treatments  Talk to your doctor, nurse or pharmacist before following any medical regimen to see if it is safe and effective for you

## 2022-10-26 NOTE — PROGRESS NOTES
Assessment/Plan:         Problem List Items Addressed This Visit        Cardiovascular and Mediastinum    Hypertension (Chronic)     Stable today            Other    Urinary incontinence     Continues to see Urology, point of care urine obtained today         Relevant Orders    Urine culture    Fever - Primary     Stay hydrated, may take Tylenol every 6 hours of to 3 g a day  Obtain blood work and chest x-ray  If you develop shortness of breath, chest pain, increasing fevers report to emergency room for further evaluation  Follow-up if no improvement in 1 week or sooner if symptoms worsen  Relevant Medications    acetaminophen (TYLENOL) tablet 1,000 mg (Completed)    Other Relevant Orders    CBC and differential    Comprehensive metabolic panel    POCT urine dip (Completed)    COVID/FLU/RSV    Urine culture      Other Visit Diagnoses     Acute nonintractable headache, unspecified headache type        Continue to take Tylenol every 6 hours and stay hydrated  Relevant Orders    CBC and differential    Comprehensive metabolic panel    COVID/FLU/RSV    Acute cystitis with hematuria        Start taking antibiotic finished all the doses even if you feel better, return in 2 weeks for follow-up  Relevant Medications    ciprofloxacin (CIPRO) 500 mg tablet            Subjective:      Patient ID: Ludy Brooks is a 79 y o  female  Patient presents to office complaining of temperature T-max 101 4 F at home today and headaches that started about 3 days ago  Patient admits to not drinking enough water, increased fatigue, weakness  Fever  This is a new problem  The current episode started in the past 7 days  The problem occurs intermittently  The problem has been waxing and waning  Associated symptoms include arthralgias, fatigue, a fever, headaches, urinary symptoms (Chronic) and weakness   Pertinent negatives include no abdominal pain, anorexia, change in bowel habit, chest pain, chills, congestion, coughing, diaphoresis, joint swelling, myalgias, nausea, neck pain, numbness, rash, sore throat, swollen glands, vertigo, visual change or vomiting  Nothing aggravates the symptoms  She has tried acetaminophen for the symptoms  The treatment provided mild relief  The following portions of the patient's history were reviewed and updated as appropriate:   Past Medical History:  She has a past medical history of Anemia (08/22/2018), Anxiety, Arthritis, AVB (atrioventricular block), Cataract, CHF (congestive heart failure) ([facility] ), COPD, mild ([place] [address] ), Coronary artery disease, Dislocation of right shoulder joint, Frequent UTI, GERD (gastroesophageal reflux disease), H/O: pneumonia, Heme positive stool, Hyperlipidemia, Hypertension, Hypothyroidism, Morbid obesity with BMI of 50 0-59 9, adult ([place] [address] ), Obesity, morbid ([place] [address] ) (08/22/2018), JANICE on CPAP, Pulmonary hypertension ([place] [address] ) (08/22/2018), Severe aortic stenosis, Simple goiter, Skin cyst, and Wears glasses  ,  _______________________________________________________________________  Medical Problems:  does not have any pertinent problems on file ,  _______________________________________________________________________  Past Surgical History:   has a past surgical history that includes Cholecystectomy; Carpal tunnel release (Bilateral); Breast biopsy; Dilation and curettage of uterus; Hysteroscopy; pr esophagogastroduodenoscopy transoral diagnostic (N/A, 8/31/2018); pr colonoscopy flx dx w/collj spec when pfrmd (N/A, 9/6/2018); Cardiac catheterization; Total knee arthroplasty (Bilateral); Total hip arthroplasty (Left, 2007); pr replace aortic valve openfemoral artery approach (N/A, 10/9/2018); pr echo transesophag r-t 2d w/prb img acquisj i&r (N/A, 10/9/2018); and Mastoid surgery  ,  _______________________________________________________________________  Family History:  family history includes Cancer in her family and father; Coronary artery disease in her family; Diabetes in her family, mother, and sister; Heart disease in her sister; Hypertension in her family and sister; Lung cancer in her father; Stroke in her family and mother ,  _______________________________________________________________________  Social History:   reports that she has never smoked  She has never used smokeless tobacco  She reports that she does not drink alcohol and does not use drugs  ,  _______________________________________________________________________  Allergies:  is allergic to latex and neosporin [neomycin-bacitracin zn-polymyx]     _______________________________________________________________________  Current Outpatient Medications   Medication Sig Dispense Refill   • albuterol (PROVENTIL HFA,VENTOLIN HFA) 90 mcg/act inhaler Inhale 2 puffs every 6 (six) hours as needed for wheezing 1 Inhaler 3   • aspirin (ECOTRIN LOW STRENGTH) 81 mg EC tablet Take 1 tablet (81 mg total) by mouth daily 100 tablet 0   • b complex vitamins capsule Take 1 capsule by mouth 2 (two) times a day       • budesonide-formoterol (Symbicort) 160-4 5 mcg/act inhaler Inhale 2 puffs 2 (two) times a day 10 2 g 3   • Calcium Carb-Cholecalciferol (CALCIUM 600 + D PO) Take 1 tablet by mouth 2 (two) times a day     • ciprofloxacin (CIPRO) 500 mg tablet Take 1 tablet (500 mg total) by mouth every 12 (twelve) hours for 10 days 20 tablet 0   • Fesoterodine Fumarate ER (Toviaz) 8 MG TB24 Take 8 mg by mouth daily      • furosemide (LASIX) 20 mg tablet TAKE 1 TABLET (20 MG TOTAL) BY MOUTH DAILY IN THE MORNING 90 tablet 3   • gabapentin (Neurontin) 100 mg capsule Take 1 capsule (100 mg total) by mouth 3 (three) times a day as needed (Sciatica pain) for up to 30 doses 30 capsule 0   • levothyroxine 50 mcg tablet TAKE 1 TABLET BY MOUTH EVERY DAY 30 tablet 0   • olmesartan (BENICAR) 5 mg tablet TAKE 1 TABLET BY MOUTH EVERY DAY 90 tablet 3   • omeprazole (PriLOSEC) 40 MG capsule TAKE 1 CAPSULE BY MOUTH TWICE A  capsule 1   • oxybutynin (DITROPAN-XL) 10 MG 24 hr tablet Take 10 mg by mouth daily     • sertraline (ZOLOFT) 100 mg tablet TAKE 1 TABLET BY MOUTH EVERY DAY 90 tablet 0   • simvastatin (ZOCOR) 40 mg tablet Take 1 tablet (40 mg total) by mouth daily at bedtime 90 tablet 1   • albuterol (2 5 mg/3 mL) 0 083 % nebulizer solution Take 3 mL (2 5 mg total) by nebulization every 6 (six) hours as needed for wheezing or shortness of breath (Patient taking differently: Take 2 5 mg by nebulization every 6 (six) hours as needed for wheezing or shortness of breath PRN) 360 mL 5   • Cranberry 250 MG TABS Take by mouth     • Diclofenac Sodium (VOLTAREN) 1 % Apply 2 g topically 4 (four) times a day 50 g 0   • mometasone (ELOCON) 0 1 % cream APPLY TO THE RIGHT EAR CANAL TWICE DAILY FOR 2 WEEKS, THEN DECREASE TO ONCE AT BEDTIME OR AS NEEDED 45 g 0   • traMADol (Ultram) 50 mg tablet Take 1 tablet (50 mg total) by mouth daily as needed for moderate pain 30 tablet 1     No current facility-administered medications for this visit      _______________________________________________________________________  Review of Systems   Constitutional: Positive for fatigue and fever  Negative for chills and diaphoresis  HENT: Negative for congestion, ear pain and sore throat  Eyes: Negative for pain and visual disturbance  Respiratory: Positive for shortness of breath (chronic)  Negative for cough  Cardiovascular: Negative for chest pain and palpitations  Gastrointestinal: Negative for abdominal pain, anorexia, change in bowel habit, nausea and vomiting  Genitourinary: Negative for dysuria and hematuria  Urinary incontinence     Musculoskeletal: Positive for arthralgias and back pain (chronic)  Negative for joint swelling, myalgias and neck pain  Skin: Negative for color change and rash  Neurological: Positive for weakness and headaches  Negative for vertigo, seizures, syncope and numbness  All other systems reviewed and are negative  Objective:  Vitals:    10/26/22 1414   BP: 120/60   Pulse: 80   Resp: 18   Temp: (!) 101 1 °F (38 4 °C)   SpO2: 93%   Height: 4' 7" (1 397 m)     Body mass index is 43 46 kg/m²  Physical Exam  Vitals and nursing note reviewed  Constitutional:       General: She is not in acute distress  Appearance: Normal appearance  She is obese  HENT:      Right Ear: Tympanic membrane, ear canal and external ear normal       Left Ear: External ear normal  There is impacted cerumen  Cardiovascular:      Rate and Rhythm: Normal rate and regular rhythm  Pulses: Normal pulses  Heart sounds: Normal heart sounds  Pulmonary:      Effort: Pulmonary effort is normal  No respiratory distress  Breath sounds: Normal breath sounds  No wheezing  Musculoskeletal:         General: Normal range of motion  Skin:     General: Skin is warm and dry  Neurological:      Mental Status: She is alert and oriented to person, place, and time  Psychiatric:         Mood and Affect: Mood normal          Behavior: Behavior normal          Thought Content:  Thought content normal          Judgment: Judgment normal

## 2022-10-26 NOTE — ASSESSMENT & PLAN NOTE
Lab Results   Component Value Date    EGFR 71 08/01/2022    EGFR 64 06/06/2022    EGFR 50 04/26/2022    CREATININE 0 78 08/01/2022    CREATININE 0 84 06/06/2022    CREATININE 1 03 04/26/2022   Encourage hydration

## 2022-10-27 LAB
FLUAV RNA RESP QL NAA+PROBE: NEGATIVE
FLUBV RNA RESP QL NAA+PROBE: NEGATIVE
RSV RNA RESP QL NAA+PROBE: NEGATIVE
SARS-COV-2 RNA RESP QL NAA+PROBE: NEGATIVE

## 2022-10-29 LAB
BACTERIA UR CULT: ABNORMAL
BACTERIA UR CULT: ABNORMAL

## 2022-10-31 ENCOUNTER — APPOINTMENT (OUTPATIENT)
Dept: PHYSICAL THERAPY | Facility: CLINIC | Age: 82
End: 2022-10-31

## 2022-11-01 ENCOUNTER — APPOINTMENT (OUTPATIENT)
Dept: LAB | Facility: HOSPITAL | Age: 82
End: 2022-11-01

## 2022-11-01 DIAGNOSIS — R51.9 ACUTE NONINTRACTABLE HEADACHE, UNSPECIFIED HEADACHE TYPE: ICD-10-CM

## 2022-11-01 DIAGNOSIS — R50.9 FEVER, UNSPECIFIED FEVER CAUSE: ICD-10-CM

## 2022-11-01 LAB
ALBUMIN SERPL BCP-MCNC: 2.7 G/DL (ref 3.5–5)
ALP SERPL-CCNC: 107 U/L (ref 46–116)
ALT SERPL W P-5'-P-CCNC: 37 U/L (ref 12–78)
ANION GAP SERPL CALCULATED.3IONS-SCNC: 5 MMOL/L (ref 4–13)
AST SERPL W P-5'-P-CCNC: 26 U/L (ref 5–45)
BASOPHILS # BLD AUTO: 0.06 THOUSANDS/ÂΜL (ref 0–0.1)
BASOPHILS NFR BLD AUTO: 1 % (ref 0–1)
BILIRUB SERPL-MCNC: 0.39 MG/DL (ref 0.2–1)
BUN SERPL-MCNC: 14 MG/DL (ref 5–25)
CALCIUM ALBUM COR SERPL-MCNC: 10.8 MG/DL (ref 8.3–10.1)
CALCIUM SERPL-MCNC: 9.8 MG/DL (ref 8.3–10.1)
CHLORIDE SERPL-SCNC: 108 MMOL/L (ref 96–108)
CO2 SERPL-SCNC: 25 MMOL/L (ref 21–32)
CREAT SERPL-MCNC: 0.69 MG/DL (ref 0.6–1.3)
EOSINOPHIL # BLD AUTO: 0.44 THOUSAND/ÂΜL (ref 0–0.61)
EOSINOPHIL NFR BLD AUTO: 4 % (ref 0–6)
ERYTHROCYTE [DISTWIDTH] IN BLOOD BY AUTOMATED COUNT: 14.1 % (ref 11.6–15.1)
GFR SERPL CREATININE-BSD FRML MDRD: 81 ML/MIN/1.73SQ M
GLUCOSE P FAST SERPL-MCNC: 93 MG/DL (ref 65–99)
HCT VFR BLD AUTO: 33.2 % (ref 34.8–46.1)
HGB BLD-MCNC: 10.3 G/DL (ref 11.5–15.4)
IMM GRANULOCYTES # BLD AUTO: 0.2 THOUSAND/UL (ref 0–0.2)
IMM GRANULOCYTES NFR BLD AUTO: 2 % (ref 0–2)
LYMPHOCYTES # BLD AUTO: 1.94 THOUSANDS/ÂΜL (ref 0.6–4.47)
LYMPHOCYTES NFR BLD AUTO: 19 % (ref 14–44)
MCH RBC QN AUTO: 28.7 PG (ref 26.8–34.3)
MCHC RBC AUTO-ENTMCNC: 31 G/DL (ref 31.4–37.4)
MCV RBC AUTO: 93 FL (ref 82–98)
MONOCYTES # BLD AUTO: 1.2 THOUSAND/ÂΜL (ref 0.17–1.22)
MONOCYTES NFR BLD AUTO: 12 % (ref 4–12)
NEUTROPHILS # BLD AUTO: 6.39 THOUSANDS/ÂΜL (ref 1.85–7.62)
NEUTS SEG NFR BLD AUTO: 62 % (ref 43–75)
NRBC BLD AUTO-RTO: 0 /100 WBCS
PLATELET # BLD AUTO: 405 THOUSANDS/UL (ref 149–390)
PMV BLD AUTO: 9.4 FL (ref 8.9–12.7)
POTASSIUM SERPL-SCNC: 4.4 MMOL/L (ref 3.5–5.3)
PROT SERPL-MCNC: 7.1 G/DL (ref 6.4–8.4)
RBC # BLD AUTO: 3.59 MILLION/UL (ref 3.81–5.12)
SODIUM SERPL-SCNC: 138 MMOL/L (ref 135–147)
WBC # BLD AUTO: 10.23 THOUSAND/UL (ref 4.31–10.16)

## 2022-11-02 ENCOUNTER — TELEPHONE (OUTPATIENT)
Dept: FAMILY MEDICINE CLINIC | Facility: CLINIC | Age: 82
End: 2022-11-02

## 2022-11-02 NOTE — TELEPHONE ENCOUNTER
----- Message from Ludy Muro, [address] sent at 11/1/2022  3:07 PM EDT -----  Please let the patient know that the blood work shows that she is dehydrated, she is to increase protein intake by eating small frequent meals- if she feeling better after antibiotic- thank you

## 2022-11-07 ENCOUNTER — EVALUATION (OUTPATIENT)
Dept: PHYSICAL THERAPY | Facility: CLINIC | Age: 82
End: 2022-11-07

## 2022-11-07 DIAGNOSIS — G89.29 ACUTE EXACERBATION OF CHRONIC LOW BACK PAIN: ICD-10-CM

## 2022-11-07 DIAGNOSIS — M54.42 ACUTE MIDLINE LOW BACK PAIN WITH LEFT-SIDED SCIATICA: Primary | ICD-10-CM

## 2022-11-07 DIAGNOSIS — M54.50 ACUTE EXACERBATION OF CHRONIC LOW BACK PAIN: ICD-10-CM

## 2022-11-07 NOTE — PROGRESS NOTES
PT Re-Evaluation     Today's date: 2022  Patient name: Ekta Lee  : 1940  MRN: [id number]  Referring provider: MABEL Brown  Dx:   Encounter Diagnosis     ICD-10-CM    1  Acute midline low back pain with left-sided sciatica  M54 42    2  Acute exacerbation of chronic low back pain  M54 50     G89 29        Start Time: 1500  Stop Time: 1540  Total time in clinic (min): 40 minutes    Assessment  Assessment details: Pt is a 79 y o  female presenting to PT services with c/o chronic LBP with L sided radicular symptoms  Pt has been participating in PT since 2022, pt has missed 3 weeks of PT due to illness  Pt has improved in regards to BLE strength and improved static transverse abdominis activation  Pt has centralized pain in comparison to initial evaluation  Pt has decreased frequency of pain, however, remains limited by pain with function and difficulty with dynamic core activation  PT and pt have discussed and agreed that is a good candidate for continued skilled physical therapy in order to improve dynamic core stability, decrease intensity of pain, improve balance, safety and functional ability  Impairments: abnormal gait, abnormal or restricted ROM, activity intolerance, impaired balance, impaired physical strength, pain with function, safety issue and poor posture     Goals  STG (3 weeks):  1  Pt will improve b/l quadriceps strength to be at least 4+/5 GOAL MET  2  Pt will improve b/l hip flexion strength to be at least 4/5  GOAL MET  3  Pt will report pain at best to be 3/10 or less GOAL MET    LTG (6 weeks):  1  Pt will be independent in HEP ONGOING  2  Pt will improve activation of transverse abdominis evidenced by palpable contraction while breathing GOAL MET  3  Pt will report no radicular symptoms into L left within the last week GOAL MET  4  Pt will improve 2MWT distance by 50 feet to demonstrate improved endurance and gait speed      Plan  Patient would benefit from: skilled physical therapy  Planned modality interventions: biofeedback, cryotherapy, TENS, thermotherapy: hydrocollator packs, unattended electrical stimulation and traction  Other planned modality interventions: IASTM, MFDc  Planned therapy interventions: manual therapy, joint mobilization, home exercise program, therapeutic exercise, therapeutic activities, stretching, strengthening, patient education, neuromuscular re-education, massage, abdominal trunk stabilization, balance, gait training, functional ROM exercises and flexibility  Frequency: 1x week  Duration in weeks: 4  Plan of Care beginning date: 2022  Plan of Care expiration date: 2022  Treatment plan discussed with: patient        Subjective Evaluation    History of Present Illness  Mechanism of injury: Pt reports that she is not having pain as constantly as she was initially  She states that sometimes the pain is worse than others  She states she hasn't been able to keep up with her HEP as she was sick for the past couple of weeks  Pain  Current pain ratin  At best pain ratin  At worst pain rating: 10  Location: L low back  Quality: nagging  Alleviating factors: hot shower, Tylenol prn, Ibuprofen prn  Aggravating factors: standing and walking (bending over)  Progression: improved    Social Support  Steps to enter house: no (ramp)  Stairs in house: yes (basement stairs, but does not use basement)   L[facility] in: [facility]  Lives with: alone (1 medium sized dog (Anna))    Employment status: not working  Hand dominance: right      Diagnostic Tests  X-ray: abnormal (severe OA lumbar spine)  Treatments  Previous treatment: physical therapy, injection treatment and medication  Patient Goals  Patient goals for therapy: increased strength, improved balance, decreased pain and increased motion  Patient goal: "to be able to walk without a walker "        Objective     Palpation   Left   Tenderness of the lumbar paraspinals     Mechanical Assessment    Cervical      Thoracic      Lumbar    Standing extension: repeated movements  Pain intensity: better  Pain level: decreased    Strength/Myotome Testing     Left Hip   Planes of Motion   Flexion: 4+    Right Hip   Planes of Motion   Flexion: 4+    Left Knee   Flexion: 4+  Extension: 5    Right Knee   Flexion: 4+  Extension: 4+    Left Ankle/Foot   Dorsiflexion: 5  Plantar flexion: 5    Right Ankle/Foot   Dorsiflexion: 5  Plantar flexion: 5    Muscle Activation   Patient able to activate left transverse abdominals and right transverse abdominals  Precautions: Access Code: [email or web address]  URL: [email or web address]         Manuals 9/27 10/6 10/10 10/17 11/7                                                            Neuro Re-Ed 9/27  10/10  11/7        TA contraction 5"x20 seated Supine 3"x20  3"x20         Rows RTB 3x10  RTB 2x10          Lat pull down             Posture correct  x20           scap retraction  3"x20 3"x20 3"x20         Seated march  2x10 2x10 2x10                      Ther Ex 9/27  10/10  11/7        Pt edu     eval findings, POC, HEP compliance        Bike             Repeated lumbar extension 3x10 standing 2x10 3x10 x20 2x10        Paloff press   RTB 2x10 ea YTB x10 ea (GH p!)         Hip abduction   2x10 ea 2x10 ea         Hip extension   2x10 ea x10 ea         Wall slides lumbar ext             LAQ  x20 x20 1# x20 ea RTB 2x10 ea        bridges  x10           Mini squats   2x10 2x10         Hamstring stretch   3x30" 30"x3         Hip adductor stretch   3x30" 30"x2         Ther Activity 9/27  10/10  11/7        2MWT      101 feet        Step up fwd             Step up lat              Gait Training 9/27  10/10  11/7                                  Modalities 9/27  10/10  11/7

## 2022-11-07 NOTE — H&P (VIEW-ONLY)
Pain Medicine Follow-Up Note    Assessment:  1  Chronic pain syndrome    2  Lumbar radiculopathy    3  Chronic right shoulder pain    4  Post-traumatic osteoarthritis of right shoulder    5  Radiculopathy, lumbar region    6  Long-term current use of opiate analgesic    7  Uncomplicated opioid dependence ([address] )        Plan:  Orders Placed This Encounter   Procedures   • [place] spine and pain procedure     Standing Status:   Future     Standing Expiration Date:   11/11/2026     Order Specific Question:   Reason for Exam:     Answer:   right intraarticular shoulder injection     Order Specific Question:   Anticoagulant hold needed? Answer:   no       No orders of the defined types were placed in this encounter  My impressions and treatment recommendations were discussed in detail with the patient who verbalized understanding and had no further questions  This is an 54-year-old female who returns to our office with complaints as noted below  Continues to have notable issues related to her low back secondary to lumbar radiculopathy which has been improved some degree with epidural steroid injections, at least 50%  However, continues to have severe right shoulder pain is not a surgical candidate  She did have some relief with right shoulder injection in the past, therefore will repeat this procedure since it has been 3 months  She continues tramadol 50 mg daily p r n  with continued improvement then significant side effects  Therefore will continue medication as is without any changes  She does not wish to escalate the medication at this time  LESI can be performed roughly every 3-4 months as needed  [place] Prescription Drug Monitoring Program report was reviewed and was appropriate       There are risks associated with opioid medications, including dependence, addiction and tolerance  The patient understands and agrees to use these medications only as prescribed   Potential side effects of the medications include, but are not limited to, constipation, drowsiness, addiction, impaired judgment and risk of fatal overdose if not taken as prescribed  The patient was warned against driving while taking sedation medications  Sharing medications is a felony  At this point in time, the patient is showing no signs of addiction, abuse, diversion or suicidal ideation  Complete risks and benefits including bleeding, infection, tissue reaction, nerve injury and allergic reaction were discussed  The approach was demonstrated using models and literature was provided  Verbal and written consent was obtained  Follow-up is planned in 8w time or sooner as warranted  Discharge instructions were provided  I personally saw and examined the patient and I agree with the above discussed plan of care  History of Present Illness:    Ping Hunt is a 79 y o  female who presents to [facility] for interval re-evaluation of the above stated pain complaints  The patient has a past medical and chronic pain history as outlined in the assessment section  She was last seen on 10/04/2022 regards to chronic right shoulder pain secondary to posttraumatic arthritis and low back pain secondary to degenerative disease and lumbar radiculopathy  Pain score 8/10  Worse in the evening  Pain is constant, sharp, shooting in nature  Current medication includes tramadol 50 mg daily p r n  Ana Aguirre Reports improvement in pain and function without any significant side effects    Pain Contract Signed:  10/04/22  Last Urine Drug Screen:  10/04/22    Other than as stated above, the patient denies any interval changes in medications, medical condition, mental condition, symptoms, or allergies since the last office visit  Review of Systems:    Review of Systems   Respiratory: Negative for shortness of breath  Cardiovascular: Negative for chest pain     Gastrointestinal: Negative for constipation, diarrhea, nausea and vomiting  Musculoskeletal: Positive for arthralgias, gait problem and myalgias  Negative for joint swelling  Decreased ROM    Skin: Negative for rash  Neurological: Negative for dizziness, seizures and weakness  All other systems reviewed and are negative          Patient Active Problem List   Diagnosis   • Hypothyroid   • Hypertension   • Hyperlipidemia   • Reactive airway disease without complication   • JANICE (obstructive sleep apnea)   • LVH (left ventricular hypertrophy)   • Mitral annular calcification   • Mitral valve stenosis   • Pulmonary hypertension ([facility])   • Chronic diastolic (congestive) heart failure ([facility])   • Anemia   • Obesity, morbid ([facility])   • Gastroesophageal reflux disease without esophagitis   • Arthritis   • AVB (atrioventricular block)   • COPD, mild ([facility])   • Coronary artery disease   • JANICE on CPAP   • S/P TAVR (transcatheter aortic valve replacement)   • Depression with anxiety   • Insomnia   • Osteopenia   • Depression, recurrent ([facility])   • Radiculopathy, lumbar region   • 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[facility] )   • Stage 3a chronic kidney disease ([facility] )   • Urinary incontinence   • Fever   • Impacted cerumen of left ear       Past Medical History:   Diagnosis Date   • Anemia 08/22/2018   • Anxiety    • Arthritis    • AVB (atrioventricular block)     first degree   • Cataract    • CHF (congestive heart failure) ([facility])    • COPD, mild ([facility])    • Coronary artery disease    • Dislocation of right shoulder joint    • Frequent UTI    • GERD (gastroesophageal reflux disease)    • H/O: pneumonia    • Heme positive stool    • Hyperlipidemia    • Hypertension    • Hypothyroidism    • Morbid obesity with BMI of 50 0-59 9, adult ([facility] [address] )    • Obesity, morbid ([facility] ) 08/22/2018   • JANICE on CPAP    • Pulmonary hypertension ([facility] ) 08/22/2018   • Severe aortic stenosis    • Simple goiter    • Skin cyst     within the armpits, right   • Wears glasses        Past Surgical History:   Procedure Laterality Date   • BREAST BIOPSY     • CARDIAC CATHETERIZATION     • CARPAL TUNNEL RELEASE Bilateral    • CHOLECYSTECTOMY     • DILATION AND CURETTAGE OF UTERUS     • HYSTEROSCOPY     • MASTOID SURGERY     • OR COLONOSCOPY FLX DX W/COLLJ SPEC WHEN PFRMD N/A 9/6/2018    Procedure: COLONOSCOPY;  Surgeon: Jessie Arshad MD;  [facility]: [facility]; Service: Gastroenterology   • OR ECHO TRANSESOPHAG R-T 2D W/PRB IMG ACQUISJ I&R N/A 10/9/2018    Procedure: INTRA-OP TRANSESOPHAGEAL ECHOCARDIOGRAM (GARRISON); Surgeon: Pattie Chino DO;  Location: [facility];  Service: Cardiac Surgery   • OR ESOPHAGOGASTRODUODENOSCOPY TRANSORAL DIAGNOSTIC N/A 8/31/2018    Procedure: ESOPHAGOGASTRODUODENOSCOPY (EGD); Surgeon: Jessie Arshad MD;  [facility]: [facility]; Service: Gastroenterology   • OR REPLACE AORTIC VALVE OPENFEMORAL ARTERY APPROACH N/A 10/9/2018    Procedure: REPLACEMENT AORTIC VALVE TRANSCATHETER (TAVR) TRANSFEMORAL W/ 23 MM MENDOZA NOE S3 VALVE (ACCESS OF LEFT);   Surgeon: Pattie Chino DO;  [facility];  Service: Cardiac Surgery   • TOTAL HIP ARTHROPLASTY Left 2007   • TOTAL KNEE ARTHROPLASTY Bilateral        Family History   Problem Relation Age of Onset   • Diabetes Mother    • Stroke Mother    • Cancer Father    • Lung cancer Father    • Diabetes Sister    • Heart disease Sister    • Hypertension Sister    • Coronary artery disease Family    • Diabetes Family    • Hypertension Family    • Cancer Family    • Stroke Family    • Thyroid disease Neg Hx        Social History     Occupational History   • Occupation: retired   Tobacco Use   • Smoking status: Never Smoker   • Smokeless tobacco: Never Used   Vaping Use   • Vaping Use: Never used   Substance and Sexual Activity   • Alcohol use: No   • Drug use: No   • Sexual activity: Never         Current Outpatient Medications:   •  albuterol (2 5 mg/3 mL) 0 083 % nebulizer solution, Take 3 mL (2 5 mg total) by nebulization every 6 (six) hours as needed for wheezing or shortness of breath (Patient taking differently: Take 2 5 mg by nebulization every 6 (six) hours as needed for wheezing or shortness of breath PRN), Disp: 360 mL, Rfl: 5  •  albuterol (PROVENTIL HFA,VENTOLIN HFA) 90 mcg/act inhaler, Inhale 2 puffs every 6 (six) hours as needed for wheezing, Disp: 1 Inhaler, Rfl: 3  •  aspirin (ECOTRIN LOW STRENGTH) 81 mg EC tablet, Take 1 tablet (81 mg total) by mouth daily, Disp: 100 tablet, Rfl: 0  •  b complex vitamins capsule, Take 1 capsule by mouth 2 (two) times a day  , Disp: , Rfl:   •  budesonide-formoterol (Symbicort) 160-4 5 mcg/act inhaler, Inhale 2 puffs 2 (two) times a day, Disp: 10 2 g, Rfl: 3  •  Calcium Carb-Cholecalciferol (CALCIUM 600 + D PO), Take 1 tablet by mouth 2 (two) times a day, Disp: , Rfl:   •  Cranberry 250 MG TABS, Take by mouth, Disp: , Rfl:   •  Diclofenac Sodium (VOLTAREN) 1 %, Apply 2 g topically 4 (four) times a day, Disp: 50 g, Rfl: 0  •  Fesoterodine Fumarate ER (Toviaz) 8 MG TB24, Take 8 mg by mouth daily , Disp: , Rfl:   •  gabapentin (Neurontin) 100 mg capsule, Take 1 capsule (100 mg total) by mouth 3 (three) times a day as needed (Sciatica pain) for up to 30 doses, Disp: 30 capsule, Rfl: 0  •  levothyroxine 50 mcg tablet, TAKE 1 TABLET BY MOUTH EVERY DAY, Disp: 30 tablet, Rfl: 0  •  mometasone (ELOCON) 0 1 % cream, APPLY TO THE RIGHT EAR CANAL TWICE DAILY FOR 2 WEEKS, THEN DECREASE TO ONCE AT BEDTIME OR AS NEEDED, Disp: 45 g, Rfl: 0  •  ofloxacin (FLOXIN) 0 3 % otic solution, Administer 5 drops into the left ear 2 (two) times a day, Disp: 10 mL, Rfl: 1  •  olmesartan (BENICAR) 5 mg tablet, TAKE 1 TABLET BY MOUTH EVERY DAY, Disp: 90 tablet, Rfl: 3  •  omeprazole (PriLOSEC) 40 MG capsule, TAKE 1 CAPSULE BY MOUTH TWICE A DAY, Disp: 180 capsule, Rfl: 1  •  oxybutynin (DITROPAN-XL) 10 MG 24 hr tablet, Take 10 mg by mouth daily, Disp: , Rfl:   •  sertraline (ZOLOFT) 100 mg tablet, TAKE 1 TABLET BY MOUTH EVERY DAY, Disp: 90 tablet, Rfl: 0  •  simvastatin (ZOCOR) 40 mg tablet, TAKE 1 TABLET BY MOUTH DAILY AT BEDTIME, Disp: 90 tablet, Rfl: 1  •  traMADol (Ultram) 50 mg tablet, Take 1 tablet (50 mg total) by mouth daily as needed for moderate pain, Disp: 30 tablet, Rfl: 1  •  furosemide (LASIX) 20 mg tablet, TAKE 1 TABLET (20 MG TOTAL) BY MOUTH DAILY IN THE MORNING, Disp: 90 tablet, Rfl: 3    Allergies   Allergen Reactions   • Latex Rash   • Neosporin [Neomycin-Bacitracin Zn-Polymyx] Rash and Other (See Comments)     hives per [facility] order       Physical Exam:    /68 (BP Location: Left arm, Patient Position: Sitting, Cuff Size: Standard)   Pulse 66   Ht 4' 7" (1 397 m)   BMI 43 46 kg/m²     Constitutional:normal, well developed, well nourished, alert, in no distress and non-toxic and no overt pain behavior    Eyes:anicteric  HEENT:grossly intact  Neck:supple, symmetric, trachea midline and no masses   Pulmonary:even and unlabored  Cardiovascular:No edema or pitting edema present  Skin:Normal without rashes or lesions and well hydrated  Psychiatric:Mood and affect appropriate  Neurologic:Cranial Nerves II-XII grossly intact  Musculoskeletal:normal      Imaging  FL spine and pain procedure    (Results Pending)         Orders Placed This Encounter   Procedures   • FL spine and pain procedure

## 2022-11-07 NOTE — PROGRESS NOTES
Pain Medicine Follow-Up Note    Assessment:  1  Chronic pain syndrome    2  Lumbar radiculopathy    3  Chronic right shoulder pain    4  Post-traumatic osteoarthritis of right shoulder    5  Radiculopathy, lumbar region    6  Long-term current use of opiate analgesic    7  Uncomplicated opioid dependence ([address] )        Plan:  Orders Placed This Encounter   Procedures   • FL spine and pain procedure     Standing Status:   Future     Standing Expiration Date:   11/11/2026     Order Specific Question:   Reason for Exam:     Answer:   right intraarticular shoulder injection     Order Specific Question:   Anticoagulant hold needed? Answer:   no       No orders of the defined types were placed in this encounter  My impressions and treatment recommendations were discussed in detail with the patient who verbalized understanding and had no further questions  This is an 69-year-old female who returns to our office with complaints as noted below  Continues to have notable issues related to her low back secondary to lumbar radiculopathy which has been improved some degree with epidural steroid injections, at least 50%  However, continues to have severe right shoulder pain is not a surgical candidate  She did have some relief with right shoulder injection in the past, therefore will repeat this procedure since it has been 3 months  She continues tramadol 50 mg daily p r n  with continued improvement then significant side effects  Therefore will continue medication as is without any changes  She does not wish to escalate the medication at this time  LESI can be performed roughly every 3-4 months as needed  [place] Prescription Drug Monitoring Program report was reviewed and was appropriate       There are risks associated with opioid medications, including dependence, addiction and tolerance  The patient understands and agrees to use these medications only as prescribed   Potential side effects of the medications include, but are not limited to, constipation, drowsiness, addiction, impaired judgment and risk of fatal overdose if not taken as prescribed  The patient was warned against driving while taking sedation medications  Sharing medications is a felony  At this point in time, the patient is showing no signs of addiction, abuse, diversion or suicidal ideation  Complete risks and benefits including bleeding, infection, tissue reaction, nerve injury and allergic reaction were discussed  The approach was demonstrated using models and literature was provided  Verbal and written consent was obtained  Follow-up is planned in 8w time or sooner as warranted  Discharge instructions were provided  I personally saw and examined the patient and I agree with the above discussed plan of care  History of Present Illness:    Og Taylor is a 79 y o  female who presents to [facility] for interval re-evaluation of the above stated pain complaints  The patient has a past medical and chronic pain history as outlined in the assessment section  She was last seen on 10/04/2022 regards to chronic right shoulder pain secondary to posttraumatic arthritis and low back pain secondary to degenerative disease and lumbar radiculopathy  Pain score 8/10  Worse in the evening  Pain is constant, sharp, shooting in nature  Current medication includes tramadol 50 mg daily p r n  Marissa Johnson Reports improvement in pain and function without any significant side effects    Pain Contract Signed:  10/04/22  Last Urine Drug Screen:  10/04/22    Other than as stated above, the patient denies any interval changes in medications, medical condition, mental condition, symptoms, or allergies since the last office visit  Review of Systems:    Review of Systems   Respiratory: Negative for shortness of breath  Cardiovascular: Negative for chest pain     Gastrointestinal: Negative for constipation, diarrhea, nausea and vomiting  Musculoskeletal: Positive for arthralgias, gait problem and myalgias  Negative for joint swelling  Decreased ROM    Skin: Negative for rash  Neurological: Negative for dizziness, seizures and weakness  All other systems reviewed and are negative          Patient Active Problem List   Diagnosis   • Hypothyroid   • Hypertension   • Hyperlipidemia   • Reactive airway disease without complication   • JANICE (obstructive sleep apnea)   • LVH (left ventricular hypertrophy)   • Mitral annular calcification   • Mitral valve stenosis   • Pulmonary hypertension ([facility])   • Chronic diastolic (congestive) heart failure ([facility])   • Anemia   • Obesity, morbid ([facility])   • Gastroesophageal reflux disease without esophagitis   • Arthritis   • AVB (atrioventricular block)   • COPD, mild ([facility])   • Coronary artery disease   • JANICE on CPAP   • S/P TAVR (transcatheter aortic valve replacement)   • Depression with anxiety   • Insomnia   • Osteopenia   • Depression, recurrent ([facility])   • Radiculopathy, lumbar region   • 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[facility] )   • Stage 3a chronic kidney disease ([facility] [address] )   • Urinary incontinence   • Fever   • Impacted cerumen of left ear       Past Medical History:   Diagnosis Date   • Anemia 08/22/2018   • Anxiety    • Arthritis    • AVB (atrioventricular block)     first degree   • Cataract    • CHF (congestive heart failure) ([facility])    • COPD, mild ([facility])    • Coronary artery disease    • Dislocation of right shoulder joint    • Frequent UTI    • GERD (gastroesophageal reflux disease)    • H/O: pneumonia    • Heme positive stool    • Hyperlipidemia    • Hypertension    • Hypothyroidism    • Morbid obesity with BMI of 50 0-59 9, adult ([facility] [address] )    • Obesity, morbid ([facility] ) 08/22/2018   • JANICE on CPAP    • [facility] ) 08/22/2018   • Severe aortic stenosis    • Simple goiter    • Skin cyst     within the armpits, right   • Wears glasses        Past Surgical History:   Procedure Laterality Date   • BREAST BIOPSY     • CARDIAC CATHETERIZATION     • CARPAL TUNNEL RELEASE Bilateral    • CHOLECYSTECTOMY     • DILATION AND CURETTAGE OF UTERUS     • HYSTEROSCOPY     • MASTOID SURGERY     • MS COLONOSCOPY FLX DX W/COLLJ SPEC WHEN PFRMD N/A 9/6/2018    Procedure: COLONOSCOPY;  Surgeon: Gabriella Henson MD;  [facility]: [facility]; Service: Gastroenterology   • MS ECHO TRANSESOPHAG R-T 2D W/PRB IMG ACQUISJ I&R N/A 10/9/2018    Procedure: INTRA-OP TRANSESOPHAGEAL ECHOCARDIOGRAM (GARRISON); Surgeon: Cristina Justin DO;  [facility]: [facility];  Service: Cardiac Surgery   • MS ESOPHAGOGASTRODUODENOSCOPY TRANSORAL DIAGNOSTIC N/A 8/31/2018    Procedure: ESOPHAGOGASTRODUODENOSCOPY (EGD); Surgeon: Gabriella Henson MD;  [facility]: [facility]; Service: Gastroenterology   • MS REPLACE AORTIC VALVE OPENFEMORAL ARTERY APPROACH N/A 10/9/2018    Procedure: REPLACEMENT AORTIC VALVE TRANSCATHETER (TAVR) TRANSFEMORAL W/ 23 MM MENDOZA NOE S3 VALVE (ACCESS OF LEFT);   Surgeon: Cristina Justin DO;  [facility];  Service: Cardiac Surgery   • TOTAL HIP ARTHROPLASTY Left 2007   • TOTAL KNEE ARTHROPLASTY Bilateral        Family History   Problem Relation Age of Onset   • Diabetes Mother    • Stroke Mother    • Cancer Father    • Lung cancer Father    • Diabetes Sister    • Heart disease Sister    • Hypertension Sister    • Coronary artery disease Family    • Diabetes Family    • Hypertension Family    • Cancer Family    • Stroke Family    • Thyroid disease Neg Hx        Social History     Occupational History   • Occupation: retired   Tobacco Use   • Smoking status: Never Smoker   • Smokeless tobacco: Never Used   Vaping Use   • Vaping Use: Never used   Substance and Sexual Activity   • Alcohol use: No   • Drug use: No   • Sexual activity: Never         Current Outpatient Medications:   •  albuterol (2 5 mg/3 mL) 0 083 % nebulizer solution, Take 3 mL (2 5 mg total) by nebulization every 6 (six) hours as needed for wheezing or shortness of breath (Patient taking differently: Take 2 5 mg by nebulization every 6 (six) hours as needed for wheezing or shortness of breath PRN), Disp: 360 mL, Rfl: 5  •  albuterol (PROVENTIL HFA,VENTOLIN HFA) 90 mcg/act inhaler, Inhale 2 puffs every 6 (six) hours as needed for wheezing, Disp: 1 Inhaler, Rfl: 3  •  aspirin (ECOTRIN LOW STRENGTH) 81 mg EC tablet, Take 1 tablet (81 mg total) by mouth daily, Disp: 100 tablet, Rfl: 0  •  b complex vitamins capsule, Take 1 capsule by mouth 2 (two) times a day  , Disp: , Rfl:   •  budesonide-formoterol (Symbicort) 160-4 5 mcg/act inhaler, Inhale 2 puffs 2 (two) times a day, Disp: 10 2 g, Rfl: 3  •  Calcium Carb-Cholecalciferol (CALCIUM 600 + D PO), Take 1 tablet by mouth 2 (two) times a day, Disp: , Rfl:   •  Cranberry 250 MG TABS, Take by mouth, Disp: , Rfl:   •  Diclofenac Sodium (VOLTAREN) 1 %, Apply 2 g topically 4 (four) times a day, Disp: 50 g, Rfl: 0  •  Fesoterodine Fumarate ER (Toviaz) 8 MG TB24, Take 8 mg by mouth daily , Disp: , Rfl:   •  gabapentin (Neurontin) 100 mg capsule, Take 1 capsule (100 mg total) by mouth 3 (three) times a day as needed (Sciatica pain) for up to 30 doses, Disp: 30 capsule, Rfl: 0  •  levothyroxine 50 mcg tablet, TAKE 1 TABLET BY MOUTH EVERY DAY, Disp: 30 tablet, Rfl: 0  •  mometasone (ELOCON) 0 1 % cream, APPLY TO THE RIGHT EAR CANAL TWICE DAILY FOR 2 WEEKS, THEN DECREASE TO ONCE AT BEDTIME OR AS NEEDED, Disp: 45 g, Rfl: 0  •  ofloxacin (FLOXIN) 0 3 % otic solution, Administer 5 drops into the left ear 2 (two) times a day, Disp: 10 mL, Rfl: 1  •  olmesartan (BENICAR) 5 mg tablet, TAKE 1 TABLET BY MOUTH EVERY DAY, Disp: 90 tablet, Rfl: 3  •  omeprazole (PriLOSEC) 40 MG capsule, TAKE 1 CAPSULE BY MOUTH TWICE A DAY, Disp: 180 capsule, Rfl: 1  •  oxybutynin (DITROPAN-XL) 10 MG 24 hr tablet, Take 10 mg by mouth daily, Disp: , Rfl:   •  sertraline (ZOLOFT) 100 mg tablet, TAKE 1 TABLET BY MOUTH EVERY DAY, Disp: 90 tablet, Rfl: 0  •  simvastatin (ZOCOR) 40 mg tablet, TAKE 1 TABLET BY MOUTH DAILY AT BEDTIME, Disp: 90 tablet, Rfl: 1  •  traMADol (Ultram) 50 mg tablet, Take 1 tablet (50 mg total) by mouth daily as needed for moderate pain, Disp: 30 tablet, Rfl: 1  •  furosemide (LASIX) 20 mg tablet, TAKE 1 TABLET (20 MG TOTAL) BY MOUTH DAILY IN THE MORNING, Disp: 90 tablet, Rfl: 3    Allergies   Allergen Reactions   • Latex Rash   • Neosporin [Neomycin-Bacitracin Zn-Polymyx] Rash and Other (See Comments)     hives per [facility] order       Physical Exam:    /68 (BP Location: Left arm, Patient Position: Sitting, Cuff Size: Standard)   Pulse 66   Ht 4' 7" (1 397 m)   BMI 43 46 kg/m²     Constitutional:normal, well developed, well nourished, alert, in no distress and non-toxic and no overt pain behavior    Eyes:anicteric  HEENT:grossly intact  Neck:supple, symmetric, trachea midline and no masses   Pulmonary:even and unlabored  Cardiovascular:No edema or pitting edema present  Skin:Normal without rashes or lesions and well hydrated  Psychiatric:Mood and affect appropriate  Neurologic:Cranial Nerves II-XII grossly intact  Musculoskeletal:normal      Imaging  FL spine and pain procedure    (Results Pending)         Orders Placed This Encounter   Procedures   • FL spine and pain procedure

## 2022-11-09 ENCOUNTER — OFFICE VISIT (OUTPATIENT)
Dept: FAMILY MEDICINE CLINIC | Facility: CLINIC | Age: 82
End: 2022-11-09

## 2022-11-09 VITALS
HEART RATE: 69 BPM | DIASTOLIC BLOOD PRESSURE: 60 MMHG | BODY MASS INDEX: 43.46 KG/M2 | SYSTOLIC BLOOD PRESSURE: 126 MMHG | TEMPERATURE: 98.5 F | HEIGHT: 55 IN | WEIGHT: 187.8 LBS | OXYGEN SATURATION: 93 %

## 2022-11-09 DIAGNOSIS — F41.8 DEPRESSION WITH ANXIETY: ICD-10-CM

## 2022-11-09 DIAGNOSIS — Z00.00 HEALTHCARE MAINTENANCE: ICD-10-CM

## 2022-11-09 DIAGNOSIS — N39.41 URGE INCONTINENCE OF URINE: Primary | ICD-10-CM

## 2022-11-09 DIAGNOSIS — H61.22 IMPACTED CERUMEN OF LEFT EAR: ICD-10-CM

## 2022-11-09 DIAGNOSIS — E78.5 HYPERLIPIDEMIA, UNSPECIFIED HYPERLIPIDEMIA TYPE: ICD-10-CM

## 2022-11-09 DIAGNOSIS — D44.7 GLOMUS TYMPANICUM TUMOR (HCC): ICD-10-CM

## 2022-11-09 DIAGNOSIS — R30.0 DYSURIA: ICD-10-CM

## 2022-11-09 RX ORDER — SIMVASTATIN 40 MG
TABLET ORAL
Qty: 90 TABLET | Refills: 1 | Status: SHIPPED | OUTPATIENT
Start: 2022-11-09

## 2022-11-09 NOTE — PROGRESS NOTES
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[facility])     Keep appointment with ENT            Nervous and Auditory    Impacted cerumen of left ear     History of left ear tumor removal   Does follow-up with ENT and has appointment tomorrow at 2:00 pm   Keep the appointment tomorrow for possible debridement  Other    Urinary incontinence - Primary     Make an appointment with Urogynecology         Relevant Orders    Ambulatory Referral to Urogynecology      Other Visit Diagnoses     Healthcare maintenance        Relevant Orders    CBC and differential    Comprehensive metabolic panel    Lipid panel    Hemoglobin A1C    UA w Reflex to Microscopic w Reflex to Culture -Lab Collect    Dysuria        Much improved after finishing antibiotic  Continue to follow-up with Urology  Subjective:      Patient ID: Florentin Holden is a 79 y o  female  Patient presents to the office complaining of left ear for fullness and discomfort  She does have history of canal wall down mastoidectomy with complete removal of glomus tympanicum in July 2019  She does follow-up with ENT every 6 months  She does have appointment set up for tomorrow  Earache   There is pain in the left ear  The current episode started yesterday  The problem has been waxing and waning  There has been no fever  Pertinent negatives include no abdominal pain, coughing, ear discharge, headaches, hearing loss, rash, rhinorrhea or sore throat  She has tried nothing for the symptoms  The treatment provided no relief  Her past medical history is significant for a chronic ear infection         The following portions of the patient's history were reviewed and updated as appropriate:   Past Medical History:  She has a past medical history of Anemia (08/22/2018), Anxiety, Arthritis, AVB (atrioventricular block), Cataract, CHF (congestive heart failure) ([address] ), COPD, mild ([address] ), Coronary artery disease, Dislocation of right shoulder joint, Frequent UTI, GERD (gastroesophageal reflux disease), H/O: pneumonia, Heme positive stool, Hyperlipidemia, Hypertension, Hypothyroidism, Morbid obesity with BMI of 50 0-59 9, adult ([facility] ), Obesity, morbid ([facility] ) (08/22/2018), JANICE on CPAP, Pulmonary hypertension ([facility] ) (08/22/2018), Severe aortic stenosis, Simple goiter, Skin cyst, and Wears glasses  ,  _______________________________________________________________________  Medical Problems:  does not have any pertinent problems on file ,  _______________________________________________________________________  Past Surgical History:   has a past surgical history that includes Cholecystectomy; Carpal tunnel release (Bilateral); Breast biopsy; Dilation and curettage of uterus; Hysteroscopy; pr esophagogastroduodenoscopy transoral diagnostic (N/A, 8/31/2018); pr colonoscopy flx dx w/collj spec when pfrmd (N/A, 9/6/2018); Cardiac catheterization; Total knee arthroplasty (Bilateral); Total hip arthroplasty (Left, 2007); pr replace aortic valve openfemoral artery approach (N/A, 10/9/2018); pr echo transesophag r-t 2d w/prb img acquisj i&r (N/A, 10/9/2018); and Mastoid surgery  ,  _______________________________________________________________________  Family History:  family history includes Cancer in her family and father; Coronary artery disease in her family; Diabetes in her family, mother, and sister; Heart disease in her sister; Hypertension in her family and sister; Lung cancer in her father; Stroke in her family and mother ,  _______________________________________________________________________  Social History:   reports that she has never smoked  She has never used smokeless tobacco  She reports that she does not drink alcohol and does not use drugs  ,  _______________________________________________________________________  Allergies:  is allergic to latex and neosporin [neomycin-bacitracin zn-polymyx]     _______________________________________________________________________  Current Outpatient Medications   Medication Sig Dispense Refill   • albuterol (2 5 mg/3 mL) 0 083 % nebulizer solution Take 3 mL (2 5 mg total) by nebulization every 6 (six) hours as needed for wheezing or shortness of breath (Patient taking differently: Take 2 5 mg by nebulization every 6 (six) hours as needed for wheezing or shortness of breath PRN) 360 mL 5   • albuterol (PROVENTIL HFA,VENTOLIN HFA) 90 mcg/act inhaler Inhale 2 puffs every 6 (six) hours as needed for wheezing 1 Inhaler 3   • aspirin (ECOTRIN LOW STRENGTH) 81 mg EC tablet Take 1 tablet (81 mg total) by mouth daily 100 tablet 0   • b complex vitamins capsule Take 1 capsule by mouth 2 (two) times a day       • budesonide-formoterol (Symbicort) 160-4 5 mcg/act inhaler Inhale 2 puffs 2 (two) times a day 10 2 g 3   • Calcium Carb-Cholecalciferol (CALCIUM 600 + D PO) Take 1 tablet by mouth 2 (two) times a day     • Cranberry 250 MG TABS Take by mouth     • Diclofenac Sodium (VOLTAREN) 1 % Apply 2 g topically 4 (four) times a day 50 g 0   • Fesoterodine Fumarate ER (Toviaz) 8 MG TB24 Take 8 mg by mouth daily      • gabapentin (Neurontin) 100 mg capsule Take 1 capsule (100 mg total) by mouth 3 (three) times a day as needed (Sciatica pain) for up to 30 doses 30 capsule 0   • levothyroxine 50 mcg tablet TAKE 1 TABLET BY MOUTH EVERY DAY 30 tablet 0   • mometasone (ELOCON) 0 1 % cream APPLY TO THE RIGHT EAR CANAL TWICE DAILY FOR 2 WEEKS, THEN DECREASE TO ONCE AT BEDTIME OR AS NEEDED 45 g 0   • olmesartan (BENICAR) 5 mg tablet TAKE 1 TABLET BY MOUTH EVERY DAY 90 tablet 3   • omeprazole (PriLOSEC) 40 MG capsule TAKE 1 CAPSULE BY MOUTH TWICE A  capsule 1   • oxybutynin (DITROPAN-XL) 10 MG 24 hr tablet Take 10 mg by mouth daily     • sertraline (ZOLOFT) 100 mg tablet TAKE 1 TABLET BY MOUTH EVERY DAY 90 tablet 0   • simvastatin (ZOCOR) 40 mg tablet TAKE 1 TABLET BY MOUTH DAILY AT BEDTIME 90 tablet 1   • traMADol (Ultram) 50 mg tablet Take 1 tablet (50 mg total) by mouth daily as needed for moderate pain 30 tablet 1   • furosemide (LASIX) 20 mg tablet TAKE 1 TABLET (20 MG TOTAL) BY MOUTH DAILY IN THE MORNING 90 tablet 3     No current facility-administered medications for this visit      _______________________________________________________________________  Review of Systems   HENT: Positive for ear pain  Negative for ear discharge, hearing loss, rhinorrhea and sore throat  Respiratory: Negative for cough  Gastrointestinal: Negative for abdominal pain  Genitourinary: Positive for urgency  Musculoskeletal: Positive for arthralgias, back pain and gait problem (Walks with a cane)  Skin: Negative for rash  Neurological: Negative for headaches  Objective:  Vitals:    11/09/22 1350   BP: 126/60   BP Location: Left arm   Patient Position: Sitting   Cuff Size: Standard   Pulse: 69   Temp: 98 5 °F (36 9 °C)   TempSrc: Tympanic   SpO2: 93%   Weight: 85 2 kg (187 lb 12 8 oz)   Height: 4' 7" (1 397 m)     Body mass index is 43 65 kg/m²  Physical Exam  Vitals and nursing note reviewed  Constitutional:       Appearance: Normal appearance  She is obese  HENT:      Right Ear: Tympanic membrane, ear canal and external ear normal       Left Ear: There is impacted cerumen  Cardiovascular:      Rate and Rhythm: Normal rate and regular rhythm  Heart sounds: Normal heart sounds  Pulmonary:      Effort: Pulmonary effort is normal       Breath sounds: Normal breath sounds  Neurological:      Mental Status: She is alert and oriented to person, place, and time  Gait: Gait abnormal (Walks with a cane)     Psychiatric:         Mood and Affect: Mood normal          Behavior: Behavior normal

## 2022-11-09 NOTE — ASSESSMENT & PLAN NOTE
History of left ear tumor removal   Does follow-up with ENT and has appointment tomorrow at 2:00 pm   Keep the appointment tomorrow for possible debridement

## 2022-11-10 RX ORDER — SERTRALINE HYDROCHLORIDE 100 MG/1
TABLET, FILM COATED ORAL
Qty: 90 TABLET | Refills: 0 | Status: SHIPPED | OUTPATIENT
Start: 2022-11-10

## 2022-11-11 ENCOUNTER — OFFICE VISIT (OUTPATIENT)
Dept: PAIN MEDICINE | Facility: CLINIC | Age: 82
End: 2022-11-11

## 2022-11-11 VITALS
SYSTOLIC BLOOD PRESSURE: 122 MMHG | DIASTOLIC BLOOD PRESSURE: 68 MMHG | HEART RATE: 66 BPM | BODY MASS INDEX: 43.46 KG/M2 | HEIGHT: 55 IN

## 2022-11-11 DIAGNOSIS — G89.4 CHRONIC PAIN SYNDROME: Primary | ICD-10-CM

## 2022-11-11 DIAGNOSIS — G89.29 CHRONIC RIGHT SHOULDER PAIN: ICD-10-CM

## 2022-11-11 DIAGNOSIS — M19.111 POST-TRAUMATIC OSTEOARTHRITIS OF RIGHT SHOULDER: ICD-10-CM

## 2022-11-11 DIAGNOSIS — F11.20 UNCOMPLICATED OPIOID DEPENDENCE (HCC): ICD-10-CM

## 2022-11-11 DIAGNOSIS — Z79.891 LONG-TERM CURRENT USE OF OPIATE ANALGESIC: ICD-10-CM

## 2022-11-11 DIAGNOSIS — M25.511 CHRONIC RIGHT SHOULDER PAIN: ICD-10-CM

## 2022-11-11 DIAGNOSIS — M54.16 RADICULOPATHY, LUMBAR REGION: ICD-10-CM

## 2022-11-11 DIAGNOSIS — M54.16 LUMBAR RADICULOPATHY: ICD-10-CM

## 2022-11-17 ENCOUNTER — OFFICE VISIT (OUTPATIENT)
Dept: PHYSICAL THERAPY | Facility: CLINIC | Age: 82
End: 2022-11-17

## 2022-11-17 DIAGNOSIS — G89.29 ACUTE EXACERBATION OF CHRONIC LOW BACK PAIN: ICD-10-CM

## 2022-11-17 DIAGNOSIS — M54.42 ACUTE MIDLINE LOW BACK PAIN WITH LEFT-SIDED SCIATICA: Primary | ICD-10-CM

## 2022-11-17 DIAGNOSIS — M54.50 ACUTE EXACERBATION OF CHRONIC LOW BACK PAIN: ICD-10-CM

## 2022-11-17 NOTE — PROGRESS NOTES
Daily Note     Today's date: 2022  Patient name: Campos Childress  : 1940  MRN: [id number]  Referring provider: MABEL Alvarez  Dx:   Encounter Diagnosis     ICD-10-CM    1  Acute midline low back pain with left-sided sciatica  M54 42       2  Acute exacerbation of chronic low back pain  M54 50     G89 29           Start Time: 1417  Stop Time: 1500  Total time in clinic (min): 43 minutes    Subjective: Pt reports that her low back is hurting today  Objective: See treatment diary below      Assessment: Tolerated treatment well  PT notes compensatory trunk lean with standing hip abduction, pt unable to fully correct  Pt has difficulty maintaining TA contraction with dynamic movements, will continue to challenged in future visits  Patient demonstrated fatigue post treatment, exhibited good technique with therapeutic exercises and would benefit from continued PT      Plan: Continue per plan of care  Progress treatment as tolerated  Precautions: Access Code: [email or web address]  URL: [email or web address]         Manuals 9/27 10/6 10/10 10/17 11/7 11/17                                                           Neuro Re-Ed 9/27  10/10  11/7 11/17       TA contraction 5"x20 seated Supine 3"x20  3"x20         Rows RTB 3x10  RTB 2x10   GTB 2x10       Lat pull down      GTB 2x10       Posture correct  x20           scap retraction  3"x20 3"x20 3"x20         Seated march  2x10 2x10 2x10  + TA x10 ea                    Ther Ex 9/27  10/10  11/7 11/17       Pt edu     eval findings, POC, HEP compliance        Bike      UBE 2'/2'       Repeated lumbar extension 3x10 standing 2x10 3x10 x20 2x10 3x10       Paloff press   RTB 2x10 ea YTB x10 ea (GH p!)  GTB 2x10 ea       Hip abduction   2x10 ea 2x10 ea  RTB 2x10 ea       Hip extension   2x10 ea x10 ea  RTB 2x10 ea       Wall slides lumbar ext             LAQ  x20 x20 1# x20 ea RTB 2x10 ea RTB 2x10 ea       bridges  x10           Mini squats   2x10 2x10 2x10       Hamstring stretch   3x30" 30"x3         Hip adductor stretch   3x30" 30"x2         Ther Activity 9/27  10/10  11/7 11/17       2MWT      101 feet        Step up fwd             Step up lat              Gait Training 9/27  10/10  11/7 11/17                                 Modalities 9/27  10/10  11/7 11/17

## 2022-11-19 DIAGNOSIS — E03.9 ACQUIRED HYPOTHYROIDISM: ICD-10-CM

## 2022-11-19 RX ORDER — LEVOTHYROXINE SODIUM 0.05 MG/1
TABLET ORAL
Qty: 30 TABLET | Refills: 0 | Status: SHIPPED | OUTPATIENT
Start: 2022-11-19

## 2022-11-23 ENCOUNTER — OFFICE VISIT (OUTPATIENT)
Dept: PHYSICAL THERAPY | Facility: CLINIC | Age: 82
End: 2022-11-23

## 2022-11-23 DIAGNOSIS — M54.50 ACUTE EXACERBATION OF CHRONIC LOW BACK PAIN: ICD-10-CM

## 2022-11-23 DIAGNOSIS — M54.42 ACUTE MIDLINE LOW BACK PAIN WITH LEFT-SIDED SCIATICA: Primary | ICD-10-CM

## 2022-11-23 DIAGNOSIS — G89.29 ACUTE EXACERBATION OF CHRONIC LOW BACK PAIN: ICD-10-CM

## 2022-11-23 NOTE — PROGRESS NOTES
Daily Note     Today's date: 2022  Patient name: Carlitos Rodriguez  : 1940  MRN: [id number]  Referring provider: Collette Drape, CRNP  Dx:   Encounter Diagnosis     ICD-10-CM    1  Acute midline low back pain with left-sided sciatica  M54 42       2  Acute exacerbation of chronic low back pain  M54 50     G89 29                      Subjective: Pt notes she is feeling depressed lately due to low back pain, shoulder pain, and b/l knee pain  Pt does state she has someone to talk to about recent depression  Objective: See treatment diary below      Assessment: Poor postural awareness remains as well as core motor control  Patient experiences knee pain with mini squats but is able to complete within tolerance  Patient does report improved pain levels following PT intervention today  Plan: Continue per plan of care  Precautions: Access Code: [email or web address]  URL: [email or web address]         Manuals 9/27 10/6 10/10 10/17 11/7 11/17 11/23                                                          Neuro Re-Ed 9/27  10/10  11/7 11/17       TA contraction 5"x20 seated Supine 3"x20  3"x20         Rows RTB 3x10  RTB 2x10   GTB 2x10 GTB 2x10      Lat pull down      GTB 2x10 GTB 2x10      Posture correct  x20           scap retraction  3"x20 3"x20 3"x20         Seated march  2x10 2x10 2x10  + TA x10 ea + TA x10 ea      Standing march       2x10      Ther Ex 9/27  10/10  11/7 11/17       Pt edu     eval findings, POC, HEP compliance        Bike      UBE 2'/2' UBE 2'/2'      Repeated lumbar extension 3x10 standing 2x10 3x10 x20 2x10 3x10       Paloff press   RTB 2x10 ea YTB x10 ea (GH p!)  GTB 2x10 ea       Hip abduction   2x10 ea 2x10 ea  RTB 2x10 ea 2x10 ea      Hip extension   2x10 ea x10 ea  RTB 2x10 ea 2x10 ea      Wall slides lumbar ext             LAQ  x20 x20 1# x20 ea RTB 2x10 ea RTB 2x10 ea 2# 2x10      bridges  x10           Mini squats   2x10 2x10  2x10 2x10      Hamstring stretch 3x30" 30"x3         Hip adductor stretch   3x30" 30"x2         Ther Activity 9/27  10/10  11/7 11/17       2MWT      101 feet        Step up fwd       2x10      Step up lat              Gait Training 9/27  10/10  11/7 11/17                                 Modalities 9/27  10/10  11/7 11/17

## 2022-11-29 ENCOUNTER — OFFICE VISIT (OUTPATIENT)
Dept: PHYSICAL THERAPY | Facility: CLINIC | Age: 82
End: 2022-11-29

## 2022-11-29 DIAGNOSIS — G89.29 ACUTE EXACERBATION OF CHRONIC LOW BACK PAIN: ICD-10-CM

## 2022-11-29 DIAGNOSIS — M54.42 ACUTE MIDLINE LOW BACK PAIN WITH LEFT-SIDED SCIATICA: Primary | ICD-10-CM

## 2022-11-29 DIAGNOSIS — M54.50 ACUTE EXACERBATION OF CHRONIC LOW BACK PAIN: ICD-10-CM

## 2022-11-29 NOTE — PROGRESS NOTES
Daily Note     Today's date: 2022  Patient name: Gordo De Jesus  : 1940  MRN: [id number]  Referring provider: MABEL Dominguez  Dx:   Encounter Diagnosis     ICD-10-CM    1  Acute midline low back pain with left-sided sciatica  M54 42       2  Acute exacerbation of chronic low back pain  M54 50     G89 29                      Subjective: Pt reports her low back is bothering her "a little bit"  She reports continued depression and states she feels worse when she feels like this  Objective: See treatment diary below      Assessment: Patient offered referral for [facility], however pt declined and stated she has someone to speak to  Patient still presenting with decreased postural awareness and decreased tolerance to standing for prolonged period of time  Pt completes charted exercises without c/o pain or discomfort  Plan: Continue per plan of care  Precautions: Access Code: [email or web address]  URL: [email or web address]         Manuals 9/27 10/6 10/10 10/17 11/7 11/17 11/23 11/29                                                         Neuro Re-Ed 9/27  10/10  11/7 11/17       TA contraction 5"x20 seated Supine 3"x20  3"x20         Rows RTB 3x10  RTB 2x10   GTB 2x10 GTB 2x10 GTB 2x10     Lat pull down      GTB 2x10 GTB 2x10 GTB 2x10     Posture correct  x20           scap retraction  3"x20 3"x20 3"x20         Seated march  2x10 2x10 2x10  + TA x10 ea + TA x10 ea + TA x20     Standing march       2x10 2x10     Standing PB press        3"x20     Ther Ex 9/27  10/10  11/7 11/17       Pt edu     eval findings, POC, HEP compliance        Bike      UBE 2'/2' UBE 2'/2' UBE 2'/2' posture     Repeated lumbar extension 3x10 standing 2x10 3x10 x20 2x10 3x10       Paloff press   RTB 2x10 ea YTB x10 ea (GH p!)  GTB 2x10 ea       Hip abduction   2x10 ea 2x10 ea  RTB 2x10 ea 2x10 ea 2x10 ea     Hip extension   2x10 ea x10 ea  RTB 2x10 ea 2x10 ea 2x10 ea     Wall slides lumbar ext             LAQ  x20 x20 1# x20 ea RTB 2x10 ea RTB 2x10 ea 2# 2x10 2# 3x10     bridges  x10           Mini squats   2x10 2x10  2x10 2x10 2x10     Hamstring stretch   3x30" 30"x3         Hip adductor stretch   3x30" 30"x2         Ther Activity 9/27  10/10  11/7 11/17       2MWT      101 feet        Step up fwd       2x10 2x10     Step up lat              STS        2x10     Gait Training 9/27  10/10  11/7 11/17                                 Modalities 9/27  10/10  11/7 11/17

## 2022-12-01 ENCOUNTER — HOSPITAL ENCOUNTER (OUTPATIENT)
Dept: RADIOLOGY | Facility: CLINIC | Age: 82
End: 2022-12-01

## 2022-12-01 VITALS
RESPIRATION RATE: 17 BRPM | TEMPERATURE: 98.7 F | HEART RATE: 46 BPM | SYSTOLIC BLOOD PRESSURE: 129 MMHG | DIASTOLIC BLOOD PRESSURE: 65 MMHG | OXYGEN SATURATION: 92 %

## 2022-12-01 DIAGNOSIS — M25.511 CHRONIC RIGHT SHOULDER PAIN: ICD-10-CM

## 2022-12-01 DIAGNOSIS — M19.111 POST-TRAUMATIC OSTEOARTHRITIS OF RIGHT SHOULDER: ICD-10-CM

## 2022-12-01 DIAGNOSIS — G89.29 CHRONIC RIGHT SHOULDER PAIN: ICD-10-CM

## 2022-12-01 RX ORDER — METHYLPREDNISOLONE ACETATE 40 MG/ML
40 INJECTION, SUSPENSION INTRA-ARTICULAR; INTRALESIONAL; INTRAMUSCULAR; PARENTERAL; SOFT TISSUE ONCE
Status: COMPLETED | OUTPATIENT
Start: 2022-12-01 | End: 2022-12-01

## 2022-12-01 RX ORDER — BUPIVACAINE HCL/PF 2.5 MG/ML
4 VIAL (ML) INJECTION ONCE
Status: COMPLETED | OUTPATIENT
Start: 2022-12-01 | End: 2022-12-01

## 2022-12-01 RX ADMIN — METHYLPREDNISOLONE ACETATE 40 MG: 40 INJECTION, SUSPENSION INTRA-ARTICULAR; INTRALESIONAL; INTRAMUSCULAR; PARENTERAL; SOFT TISSUE at 14:14

## 2022-12-01 RX ADMIN — IOHEXOL 1 ML: 300 INJECTION, SOLUTION INTRAVENOUS at 14:13

## 2022-12-01 RX ADMIN — Medication 4 ML: at 14:14

## 2022-12-01 NOTE — DISCHARGE INSTR - LAB
Steroid Joint Injection   WHAT YOU NEED TO KNOW:   A steroid joint injection is a procedure to inject steroid medicine into a joint  Steroid medicine decreases pain and inflammation  The injection may also contain an anesthetic (numbing medicine) to decrease pain  It may be done to treat conditions such as arthritis, gout, or carpal tunnel syndrome  The injections may be given in your knee, ankle, shoulder, elbow, wrist, ankle or sacroiliac joint  Do not apply heat to any area that is numb  If you have discomfort or soreness at the injection site, you may apply ice today, 20 minutes on and 20 minutes off  Tomorrow you may use ice or warm, moist heat  Do not apply ice or heat directly to the skin  You may have an increase or change in the discomfort for 36-48 hours after your treatment  Apply ice and continue with any pain medicine you have been prescribed  Do not do anything strenuous today  You may shower, but no tub baths or hot tubs today  You may resume your normal activities tomorrow, but do not “overdo it”  Resume normal activities slowly when you are feeling better  If you experience redness, drainage or swelling at the injection site, or if you develop a fever above 100 degrees, please call [facility] at [phone] or go to the Emergency Room  Continue to take all routine medicines prescribed by your primary care physician unless otherwise instructed by our staff  Most blood thinners should be started again according to your regularly scheduled dosing  If you have any questions, please give our office a call  As no general anesthesia was used in today's procedure, you should not experience any side effects related to anesthesia  If you are diabetic, the steroids used in today's injection may temporarily increase your blood sugar levels after the first few days after your injection   Please keep a close eye on your sugars and alert the doctor who manages your diabetes if your sugars are significantly high from your baseline or you are symptomatic  If you have a problem specifically related to your procedure, please call our office at [phone]  Problems not related to your procedure should be directed to your primary care physician

## 2022-12-01 NOTE — INTERVAL H&P NOTE
Update: (This section must be completed if the H&P was completed greater than 24 hrs to procedure or admission)    H&P reviewed  After examining the patient, I find no changed to the H&P since it had been written  Patient re-evaluated   Accept as history and physical     Vilma Acevedo MD/December 1, 2022/1:49 PM

## 2022-12-02 DIAGNOSIS — E03.9 ACQUIRED HYPOTHYROIDISM: ICD-10-CM

## 2022-12-02 RX ORDER — LEVOTHYROXINE SODIUM 0.05 MG/1
TABLET ORAL
Qty: 90 TABLET | Refills: 1 | Status: SHIPPED | OUTPATIENT
Start: 2022-12-02

## 2022-12-08 ENCOUNTER — EVALUATION (OUTPATIENT)
Dept: PHYSICAL THERAPY | Facility: CLINIC | Age: 82
End: 2022-12-08

## 2022-12-08 ENCOUNTER — TELEPHONE (OUTPATIENT)
Dept: PAIN MEDICINE | Facility: CLINIC | Age: 82
End: 2022-12-08

## 2022-12-08 DIAGNOSIS — M54.50 ACUTE EXACERBATION OF CHRONIC LOW BACK PAIN: ICD-10-CM

## 2022-12-08 DIAGNOSIS — M54.42 ACUTE MIDLINE LOW BACK PAIN WITH LEFT-SIDED SCIATICA: Primary | ICD-10-CM

## 2022-12-08 DIAGNOSIS — G89.29 ACUTE EXACERBATION OF CHRONIC LOW BACK PAIN: ICD-10-CM

## 2022-12-08 NOTE — TELEPHONE ENCOUNTER
Pt reports some improvement post inj   Pain level 8/10  Pt aware I will call next week for an update

## 2022-12-08 NOTE — PROGRESS NOTES
PT Discharge    Today's date: 2022  Patient name: Christiana Alonzo  : 1940  MRN: [id number]  Referring provider: MABEL Gongora  Dx:   Encounter Diagnosis     ICD-10-CM    1  Acute midline low back pain with left-sided sciatica  M54 42       2  Acute exacerbation of chronic low back pain  M54 50     G89 29           Start Time: 1415  Stop Time: 1450  Total time in clinic (min): 35 minutes    Assessment  Assessment details: Pt is a 79 y o  female presenting to PT services with c/o chronic LBP with L sided radicular symptoms  Pt has been participating in PT since 2022, pt has reached a functional plateau since last evaluation  Pt continues to have L sided low back pain  Pt was reminded of the importance of performing her HEP  PT and pt have discussed and agreed that pt will return to referring provider for further assessment and guidance to alleviate back pain  Pt has maintained BLE and core strength gains  Pt was informed that if she has any questions or concerns she is welcome to contact the facility at any time  Pt is discharged from skilled physical therapy  Goals  STG (3 weeks):  1  Pt will improve b/l quadriceps strength to be at least 4+/5 GOAL MET  2  Pt will improve b/l hip flexion strength to be at least 4/5  GOAL MET  3  Pt will report pain at best to be 3/10 or less GOAL MET    LTG (6 weeks):  1  Pt will be independent in HEP PARTIALLY MET  2  Pt will improve activation of transverse abdominis evidenced by palpable contraction while breathing GOAL MET  3  Pt will report no radicular symptoms into L left within the last week GOAL MET  4  Pt will improve 2MWT distance by 50 feet to demonstrate improved endurance and gait speed   NOT MET    Plan  Patient would benefit from: skilled physical therapy  Planned modality interventions: biofeedback, cryotherapy, TENS, thermotherapy: hydrocollator packs, unattended electrical stimulation and traction  Other planned modality interventions: [place] [facility]  Planned therapy interventions: manual therapy, joint mobilization, home exercise program, therapeutic exercise, therapeutic activities, stretching, strengthening, patient education, neuromuscular re-education, massage, abdominal trunk stabilization, balance, gait training, functional ROM exercises and flexibility  Frequency: 1x week  Duration in weeks: 4  Plan of Care beginning date: 2022  Plan of Care expiration date: 2022  Treatment plan discussed with: patient        Subjective Evaluation    History of Present Illness  Mechanism of injury: Pt states that her back is still hurting  She states that she hasn't been able to do her HEP  Pain  Current pain ratin  At best pain ratin  At worst pain rating: 10  Location: L low back  Quality: nagging  Alleviating factors: hot shower, Tylenol prn, Ibuprofen prn  Aggravating factors: standing and walking (bending over)  Progression: no change    Social Support  Steps to enter house: no (ramp)  Stairs in house: yes (basement stairs, but does not use basement)   Lives in: [facility]  Lives with: alone (1 medium sized dog (Anna))    Employment status: not working  Hand dominance: right      Diagnostic Tests  X-ray: abnormal (severe OA lumbar spine)  Treatments  Previous treatment: physical therapy, injection treatment and medication  Patient Goals  Patient goals for therapy: increased strength, improved balance, decreased pain and increased motion  Patient goal: "to be able to walk without a walker "        Objective     Palpation   Left   No palpable tenderness to the lumbar paraspinals     Mechanical Assessment    Cervical      Thoracic      Lumbar    Standing extension: repeated movements  Pain intensity: better  Pain level: decreased    Strength/Myotome Testing     Left Hip   Planes of Motion   Flexion: 4+    Right Hip   Planes of Motion   Flexion: 4+    Left Knee   Flexion: 5  Extension: 5    Right Knee   Flexion: 5  Extension: 5    Left Ankle/Foot   Dorsiflexion: 5  Plantar flexion: 5    Right Ankle/Foot   Dorsiflexion: 5  Plantar flexion: 5    Muscle Activation   Patient able to activate left transverse abdominals and right transverse abdominals  Neuro Exam:     Functional outcomes   2 minute walk test: 138 feet         Precautions: Access Code: [email or web address]  URL: [email or web address]         Manuals 9/27 10/6 10/10 10/17 11/7 11/17 11/23 11/29 12/8                                                        Neuro Re-Ed 9/27  10/10  11/7 11/17       TA contraction 5"x20 seated Supine 3"x20  3"x20         Rows RTB 3x10  RTB 2x10   GTB 2x10 GTB 2x10 GTB 2x10     Lat pull down      GTB 2x10 GTB 2x10 GTB 2x10     Posture correct  x20           scap retraction  3"x20 3"x20 3"x20         Seated march  2x10 2x10 2x10  + TA x10 ea + TA x10 ea + TA x20     Standing march       2x10 2x10     Standing PB press        3"x20     Ther Ex 9/27  10/10  11/7 11/17       Pt edu     eval findings, POC, HEP compliance    Re-eval findings, POC, HEP review    Bike      UBE 2'/2' UBE 2'/2' UBE 2'/2' posture UBE 2'/2' posture    Repeated lumbar extension 3x10 standing 2x10 3x10 x20 2x10 3x10       Paloff press   RTB 2x10 ea YTB x10 ea (GH p!)  GTB 2x10 ea       Hip abduction   2x10 ea 2x10 ea  RTB 2x10 ea 2x10 ea 2x10 ea     Hip extension   2x10 ea x10 ea  RTB 2x10 ea 2x10 ea 2x10 ea     Wall slides lumbar ext             LAQ  x20 x20 1# x20 ea RTB 2x10 ea RTB 2x10 ea 2# 2x10 2# 3x10     bridges  x10           Mini squats   2x10 2x10  2x10 2x10 2x10     Hamstring stretch   3x30" 30"x3         Hip adductor stretch   3x30" 30"x2         Ther Activity 9/27  10/10  11/7 11/17       2MWT      101 feet   138 feet     Step up fwd       2x10 2x10     Step up lat              STS        2x10     Gait Training 9/27  10/10  11/7 11/17                                 Modalities 9/27  10/10  11/7 11/17

## 2022-12-15 NOTE — TELEPHONE ENCOUNTER
Caller: Filiberto Bates   Doctor/office: Dr Remigio Bray  #: [phone]    % of improvement: 30%  Pain Scale (1-10): 0/10

## 2022-12-19 ENCOUNTER — HOSPITAL ENCOUNTER (EMERGENCY)
Facility: HOSPITAL | Age: 82
Discharge: HOME | End: 2022-12-19
Attending: EMERGENCY MEDICINE

## 2022-12-19 ENCOUNTER — APPOINTMENT (EMERGENCY)
Dept: RADIOLOGY | Facility: HOSPITAL | Age: 82
End: 2022-12-19

## 2022-12-19 VITALS
DIASTOLIC BLOOD PRESSURE: 58 MMHG | TEMPERATURE: 97.8 F | HEART RATE: 60 BPM | SYSTOLIC BLOOD PRESSURE: 116 MMHG | OXYGEN SATURATION: 90 % | RESPIRATION RATE: 18 BRPM | BODY MASS INDEX: 42.3 KG/M2 | WEIGHT: 182 LBS

## 2022-12-19 DIAGNOSIS — M25.519 SHOULDER PAIN: Primary | ICD-10-CM

## 2022-12-19 LAB
2HR DELTA HS TROPONIN: 0 NG/L
ALBUMIN SERPL BCP-MCNC: 3 G/DL (ref 3.5–5)
ALP SERPL-CCNC: 81 U/L (ref 46–116)
ALT SERPL W P-5'-P-CCNC: 26 U/L (ref 12–78)
ANION GAP SERPL CALCULATED.3IONS-SCNC: 8 MMOL/L (ref 4–13)
AST SERPL W P-5'-P-CCNC: 22 U/L (ref 5–45)
BASOPHILS # BLD AUTO: 0.05 THOUSANDS/ÂΜL (ref 0–0.1)
BASOPHILS NFR BLD AUTO: 1 % (ref 0–1)
BILIRUB SERPL-MCNC: 0.19 MG/DL (ref 0.2–1)
BUN SERPL-MCNC: 40 MG/DL (ref 5–25)
CALCIUM ALBUM COR SERPL-MCNC: 10.4 MG/DL (ref 8.3–10.1)
CALCIUM SERPL-MCNC: 9.6 MG/DL (ref 8.3–10.1)
CARDIAC TROPONIN I PNL SERPL HS: 9 NG/L
CARDIAC TROPONIN I PNL SERPL HS: 9 NG/L
CHLORIDE SERPL-SCNC: 109 MMOL/L (ref 96–108)
CO2 SERPL-SCNC: 24 MMOL/L (ref 21–32)
CREAT SERPL-MCNC: 1.08 MG/DL (ref 0.6–1.3)
EOSINOPHIL # BLD AUTO: 0.82 THOUSAND/ÂΜL (ref 0–0.61)
EOSINOPHIL NFR BLD AUTO: 8 % (ref 0–6)
ERYTHROCYTE [DISTWIDTH] IN BLOOD BY AUTOMATED COUNT: 15.1 % (ref 11.6–15.1)
GFR SERPL CREATININE-BSD FRML MDRD: 47 ML/MIN/1.73SQ M
GLUCOSE SERPL-MCNC: 119 MG/DL (ref 65–140)
HCT VFR BLD AUTO: 33.5 % (ref 34.8–46.1)
HGB BLD-MCNC: 10.2 G/DL (ref 11.5–15.4)
IMM GRANULOCYTES # BLD AUTO: 0.04 THOUSAND/UL (ref 0–0.2)
IMM GRANULOCYTES NFR BLD AUTO: 0 % (ref 0–2)
LYMPHOCYTES # BLD AUTO: 1.98 THOUSANDS/ÂΜL (ref 0.6–4.47)
LYMPHOCYTES NFR BLD AUTO: 20 % (ref 14–44)
MCH RBC QN AUTO: 28.1 PG (ref 26.8–34.3)
MCHC RBC AUTO-ENTMCNC: 30.4 G/DL (ref 31.4–37.4)
MCV RBC AUTO: 92 FL (ref 82–98)
MONOCYTES # BLD AUTO: 1.08 THOUSAND/ÂΜL (ref 0.17–1.22)
MONOCYTES NFR BLD AUTO: 11 % (ref 4–12)
NEUTROPHILS # BLD AUTO: 6.16 THOUSANDS/ÂΜL (ref 1.85–7.62)
NEUTS SEG NFR BLD AUTO: 60 % (ref 43–75)
NRBC BLD AUTO-RTO: 0 /100 WBCS
PLATELET # BLD AUTO: 338 THOUSANDS/UL (ref 149–390)
PMV BLD AUTO: 9.8 FL (ref 8.9–12.7)
POTASSIUM SERPL-SCNC: 4.5 MMOL/L (ref 3.5–5.3)
PROT SERPL-MCNC: 6.8 G/DL (ref 6.4–8.4)
RBC # BLD AUTO: 3.63 MILLION/UL (ref 3.81–5.12)
SODIUM SERPL-SCNC: 141 MMOL/L (ref 135–147)
WBC # BLD AUTO: 10.13 THOUSAND/UL (ref 4.31–10.16)

## 2022-12-19 RX ORDER — LIDOCAINE 50 MG/G
1 PATCH TOPICAL ONCE
Status: DISCONTINUED | OUTPATIENT
Start: 2022-12-19 | End: 2022-12-19 | Stop reason: HOSPADM

## 2022-12-19 RX ORDER — OXYCODONE HYDROCHLORIDE 5 MG/1
5 TABLET ORAL ONCE
Status: COMPLETED | OUTPATIENT
Start: 2022-12-19 | End: 2022-12-19

## 2022-12-19 RX ORDER — LIDOCAINE 50 MG/G
1 PATCH TOPICAL DAILY
Qty: 30 PATCH | Refills: 0 | Status: SHIPPED | OUTPATIENT
Start: 2022-12-19

## 2022-12-19 RX ADMIN — LIDOCAINE 1 PATCH: 50 PATCH TOPICAL at 14:18

## 2022-12-19 RX ADMIN — OXYCODONE HYDROCHLORIDE 5 MG: 5 TABLET ORAL at 14:19

## 2022-12-19 NOTE — ED PROVIDER NOTES
History  Chief Complaint   Patient presents with   • Arm Pain       Worsening L arm pain since pushing her walker through the snow  Reports chronic L arm pain that she sees pain management for  Patient is an 24-year-old female past medical history of hypertension, hyperlipidemia, CAD, CKD stage III, pulmonary hypertension, COPD,, hypothyroid presenting with left shoulder pain  Patient states that she has had constant left shoulder pain which radiates down her arm to her elbow and states that it began yesterday at 10 AM while sitting down  She states that it may be worse with movement of the shoulder and denies any injuries recently to the shoulder  Has been taking ibuprofen and Tylenol with no relief  She denies any numbness or tingling, fevers, nausea or vomiting, chest pain, rashes, dysuria but does note intermittent shortness of breath since arrival in the emergency department today  She denies any dizziness, or rashes  States that she was pushing her walker through the snow multiple days this week beginning roughly 3 to 4 days ago  Sees pain management for right shoulder pain  Prior to Admission Medications   Prescriptions Last Dose Informant Patient Reported? Taking?    Calcium Carb-Cholecalciferol (CALCIUM 600 + D PO)   Yes No   Sig: Take 1 tablet by mouth 2 (two) times a day   Cranberry 250 MG TABS   Yes No   Sig: Take by mouth   Diclofenac Sodium (VOLTAREN) 1 %   No No   Sig: Apply 2 g topically 4 (four) times a day   Fesoterodine Fumarate ER (Toviaz) 8 MG TB24   Yes No   Sig: Take 8 mg by mouth daily    albuterol (2 5 mg/3 mL) 0 083 % nebulizer solution   No No   Sig: Take 3 mL (2 5 mg total) by nebulization every 6 (six) hours as needed for wheezing or shortness of breath   Patient taking differently: Take 2 5 mg by nebulization every 6 (six) hours as needed for wheezing or shortness of breath PRN   albuterol (PROVENTIL HFA,VENTOLIN HFA) 90 mcg/act inhaler   No No   Sig: Inhale 2 puffs every 6 (six) hours as needed for wheezing   aspirin (ECOTRIN LOW STRENGTH) 81 mg EC tablet   No No   Sig: Take 1 tablet (81 mg total) by mouth daily   b complex vitamins capsule   Yes No   Sig: Take 1 capsule by mouth 2 (two) times a day     budesonide-formoterol (Symbicort) 160-4 5 mcg/act inhaler   No No   Sig: Inhale 2 puffs 2 (two) times a day   furosemide (LASIX) 20 mg tablet   No No   Sig: TAKE 1 TABLET (20 MG TOTAL) BY MOUTH DAILY IN THE MORNING   Patient not taking: Reported on 11/18/2022   gabapentin (Neurontin) 100 mg capsule   No No   Sig: Take 1 capsule (100 mg total) by mouth 3 (three) times a day as needed (Sciatica pain) for up to 30 doses   levothyroxine 50 mcg tablet   No No   Sig: TAKE 1 TABLET BY MOUTH EVERY DAY   mometasone (ELOCON) 0 1 % cream   No No   Sig: APPLY TO THE RIGHT EAR CANAL TWICE DAILY FOR 2 WEEKS, THEN DECREASE TO ONCE AT BEDTIME OR AS NEEDED   ofloxacin (FLOXIN) 0 3 % otic solution   No No   Sig: Administer 5 drops into the left ear 2 (two) times a day   olmesartan (BENICAR) 5 mg tablet   No No   Sig: TAKE 1 TABLET BY MOUTH EVERY DAY   omeprazole (PriLOSEC) 40 MG capsule   No No   Sig: TAKE 1 CAPSULE BY MOUTH TWICE A DAY   oxybutynin (DITROPAN-XL) 10 MG 24 hr tablet   Yes No   Sig: Take 10 mg by mouth daily   sertraline (ZOLOFT) 100 mg tablet   No No   Sig: TAKE 1 TABLET BY MOUTH EVERY DAY   simvastatin (ZOCOR) 40 mg tablet   No No   Sig: TAKE 1 TABLET BY MOUTH DAILY AT BEDTIME   traMADol (Ultram) 50 mg tablet   No No   Sig: Take 1 tablet (50 mg total) by mouth daily as needed for moderate pain      Facility-Administered Medications: None       Past Medical History:   Diagnosis Date   • Anemia 08/22/2018   • Anxiety    • Arthritis    • AVB (atrioventricular block)     first degree   • Cataract    • CHF (congestive heart failure) (HCC)    • COPD, mild (HCC)    • Coronary artery disease    • Dislocation of right shoulder joint    • Frequent UTI    • GERD (gastroesophageal reflux disease)    • H/O: pneumonia    • Heme positive stool    • Hyperlipidemia    • Hypertension    • Hypothyroidism    • Morbid obesity with BMI of 50 0-59 9, adult ([facility] )    • Obesity, morbid ([facility] ) 08/22/2018   • JANICE on CPAP    • Pulmonary hypertension ([facility] ) 08/22/2018   • Severe aortic stenosis    • Simple goiter    • Skin cyst     within the armpits, right   • Wears glasses        Past Surgical History:   Procedure Laterality Date   • BREAST BIOPSY     • CARDIAC CATHETERIZATION     • CARPAL TUNNEL RELEASE Bilateral    • CHOLECYSTECTOMY     • DILATION AND CURETTAGE OF UTERUS     • HYSTEROSCOPY     • MASTOID SURGERY     • DC COLONOSCOPY FLX DX W/COLLJ SPEC WHEN PFRMD N/A 9/6/2018    Procedure: COLONOSCOPY;  Surgeon: Nicole Rothman MD;  Location: [facility]; Service: Gastroenterology   • DC ECHO TRANSESOPHAG R-T 2D W/PRB IMG ACQUISJ I&R N/A 10/9/2018    Procedure: INTRA-OP TRANSESOPHAGEAL ECHOCARDIOGRAM (GARRISON); Surgeon: Annmarie Dejesus DO;  Location: [facility];  Service: Cardiac Surgery   • DC ESOPHAGOGASTRODUODENOSCOPY TRANSORAL DIAGNOSTIC N/A 8/31/2018    Procedure: ESOPHAGOGASTRODUODENOSCOPY (EGD); Surgeon: Nicole Rothman MD;  Location: [facility]; Service: Gastroenterology   • DC REPLACE AORTIC VALVE OPENFEMORAL ARTERY APPROACH N/A 10/9/2018    Procedure: REPLACEMENT AORTIC VALVE TRANSCATHETER (TAVR) TRANSFEMORAL W/ 23 MM MENDOZA NOE S3 VALVE (ACCESS OF LEFT);   Surgeon: Annmarie Dejesus DO;  Location: [facility];  Service: Cardiac Surgery   • TOTAL HIP ARTHROPLASTY Left 2007   • TOTAL KNEE ARTHROPLASTY Bilateral        Family History   Problem Relation Age of Onset   • Diabetes Mother    • Stroke Mother    • Cancer Father    • Lung cancer Father    • Diabetes Sister    • Heart disease Sister    • Hypertension Sister    • Coronary artery disease Family    • Diabetes Family    • Hypertension Family    • Cancer Family    • Stroke Family    • Thyroid disease Neg Hx      I have reviewed and agree with the history as documented  E-Cigarette/Vaping   • E-Cigarette Use Never User      E-Cigarette/Vaping Substances   • Nicotine No    • THC No    • CBD No    • Flavoring No    • Other No    • Unknown No      Social History     Tobacco Use   • Smoking status: Never   • Smokeless tobacco: Never   Vaping Use   • Vaping Use: Never used   Substance Use Topics   • Alcohol use: No   • Drug use: No       Review of Systems   All other systems reviewed and are negative  Physical Exam  Physical Exam  Vitals reviewed  Constitutional:       General: She is not in acute distress  Appearance: Normal appearance  She is not ill-appearing  HENT:      Mouth/Throat:      Mouth: Mucous membranes are moist    Eyes:      Conjunctiva/sclera: Conjunctivae normal    Cardiovascular:      Rate and Rhythm: Normal rate and regular rhythm  Heart sounds: Normal heart sounds  Pulmonary:      Effort: Pulmonary effort is normal       Breath sounds: Normal breath sounds  Abdominal:      General: Abdomen is flat  Palpations: Abdomen is soft  Tenderness: There is no abdominal tenderness  Musculoskeletal:         General: Tenderness present  No swelling  Normal range of motion  Cervical back: Normal range of motion and neck supple  No tenderness  Comments: Patient has full range of motion of the shoulder, mild tenderness about the humeral head and proximal humerus, intact distal motor, sensation, pulses, no significant edema, erythema or increased warmth   Skin:     General: Skin is warm and dry  Capillary Refill: Capillary refill takes less than 2 seconds  Neurological:      General: No focal deficit present  Mental Status: She is alert  Sensory: No sensory deficit  Motor: No weakness     Psychiatric:         Mood and Affect: Mood normal          Vital Signs  ED Triage Vitals [12/19/22 1230]   Temperature Pulse Respirations Blood Pressure SpO2   97 8 °F (36 6 °C) 56 18 141/65 92 % Temp Source Heart Rate Source Patient Position - Orthostatic VS BP Location FiO2 (%)   Temporal Monitor Sitting Left arm --      Pain Score       10 - Worst Possible Pain           Vitals:    12/19/22 1230   BP: 141/65   Pulse: 56   Patient Position - Orthostatic VS: Sitting         Visual Acuity      ED Medications  Medications   lidocaine (LIDODERM) 5 % patch 1 patch (has no administration in time range)   oxyCODONE (ROXICODONE) IR tablet 5 mg (has no administration in time range)       Diagnostic Studies  Results Reviewed     None                 No orders to display              Procedures  ECG 12 Lead Documentation Only    Date/Time: 12/19/2022 2:25 PM  Performed by: Aldo Rey DO  Authorized by: Aldo Rey DO     ECG reviewed by me, the ED Provider: yes    Patient location:  ED  Previous ECG:     Previous ECG:  Compared to current    Similarity:  No change  Interpretation:     Interpretation: normal    Rate:     ECG rate assessment: bradycardic    Rhythm:     Rhythm: sinus rhythm    Ectopy:     Ectopy: none    QRS:     QRS axis:  Left    QRS intervals:  Normal  Conduction:     Conduction: abnormal      Abnormal conduction: 1st degree    ST segments:     ST segments:  Normal  T waves:     T waves: normal               ED Course  ED Course as of 12/19/22 2116   Mon Dec 19, 2022   1442 Patient notes improvement of her shoulder pain, work-up unremarkable, will obtain delta troponin and discharge with outpatient orthopedic follow-up and pain management follow-up  [id number] Patient has had intermittent hypoxia to 88% while asleep but states that she wears CPAP at home while asleep and intermittently wears 2 L nasal cannula as needed during the day  Denies any cough or fevers at home or chest pain  Chest x-ray appears unchanged, do not feel the patient requires further evaluation at this time and may be discharged home if delta troponin unremarkable                 Identification of Seniors at Risk    Flowsheet Row Most Recent Value   (ISAR) Identification of Seniors at Risk    Before the illness or injury that brought you to the Emergency, did you need someone to help you on a regular basis? 0 Filed at: 12/19/2022 1231   In the last 24 hours, have you needed more help than usual? 1 Filed at: 12/19/2022 1231   Have you been hospitalized for one or more nights during the past 6 months? 0 Filed at: 12/19/2022 1231   In general, do you see well? 0 Filed at: 12/19/2022 1231   In general, do you have serious problems with your memory? 0 Filed at: 12/19/2022 1231   Do you take more than three different medications every day? 1 Filed at: 12/19/2022 1231   ISAR Score 2 Filed at: 12/19/2022 1231                                    MDM  Number of Diagnoses or Management Options  Diagnosis management comments: Patient is an 79-year-old female past medical history of COPD, pulmonary hypertension, hypertension, hyperlipidemia, CHF, CAD, CKD stage III, hypothyroid presenting with left shoulder pain  Patient is well-appearing at bedside with stable vitals though with low-grade hypoxia at 92%, lungs clear to auscultation, equal pulses, equal strength and sensation in the upper extremities, no significant edema, erythema or increased warmth, full range of motion with mild tenderness about the humeral head and proximal humerus  Will obtain shoulder x-ray however given patient's shortness of breath and minimal pain with movement will obtain cardiac work-up to rule out other causes such as ACS  Give pain control and reassess  Do not feel the patient requires imaging of the neck at this time as she has no midline tenderness and full range of motion  Disposition  Final diagnoses:   None     ED Disposition     None      Follow-up Information    None         Patient's Medications   Discharge Prescriptions    No medications on file       No discharge procedures on file      PDMP Review       Value Time User    PDMP Reviewed  Yes 11/11/2022  3:25 PM Yumiko Cook MD          ED Provider  Electronically Signed by           Gokul Farfan,   12/19/22 2116

## 2022-12-19 NOTE — ED NOTES
As pt was called back from waiting room she informed this writer she would like to use the restroom  Upon returning from the restroom pt fell from a standing position attempting to get into the recliner  Several providers responded to hearing the fall and provider HYACINTH Solomon assessed the pt to which the pt replied she did not hit her head or feel any injury occur  Pt was wearing her sneakers and using her personal walker from home  Pt had a steady gait and primary provider made aware of the event  Fall report completed        Danielle Castañeda RN  12/19/22 2904

## 2022-12-20 LAB
ATRIAL RATE: 58 BPM
P AXIS: 54 DEGREES
PR INTERVAL: 220 MS
QRS AXIS: -34 DEGREES
QRSD INTERVAL: 82 MS
QT INTERVAL: 420 MS
QTC INTERVAL: 412 MS
T WAVE AXIS: 52 DEGREES
VENTRICULAR RATE: 58 BPM

## 2022-12-25 PROBLEM — R50.9 FEVER: Status: RESOLVED | Noted: 2022-10-26 | Resolved: 2022-12-25

## 2022-12-28 DIAGNOSIS — F41.8 DEPRESSION WITH ANXIETY: ICD-10-CM

## 2022-12-28 RX ORDER — SERTRALINE HYDROCHLORIDE 100 MG/1
TABLET, FILM COATED ORAL
Qty: 90 TABLET | Refills: 0 | Status: SHIPPED | OUTPATIENT
Start: 2022-12-28

## 2023-01-01 NOTE — PROGRESS NOTES
Pt discharged  Madeleine Marissa (pt's ride- 's wife) made aware of pt being in the discharge lounge    Madeleine Ann states she will be here around 3pm  hard copy, drawn during this pregnancy

## 2023-01-08 PROBLEM — H61.22 IMPACTED CERUMEN OF LEFT EAR: Status: RESOLVED | Noted: 2022-11-09 | Resolved: 2023-01-08

## 2023-01-16 ENCOUNTER — OFFICE VISIT (OUTPATIENT)
Dept: CARDIOLOGY CLINIC | Facility: CLINIC | Age: 83
End: 2023-01-16

## 2023-01-16 VITALS
RESPIRATION RATE: 18 BRPM | HEIGHT: 55 IN | SYSTOLIC BLOOD PRESSURE: 120 MMHG | OXYGEN SATURATION: 96 % | DIASTOLIC BLOOD PRESSURE: 60 MMHG | HEART RATE: 54 BPM | BODY MASS INDEX: 42.12 KG/M2 | WEIGHT: 182 LBS

## 2023-01-16 DIAGNOSIS — G47.33 OSA (OBSTRUCTIVE SLEEP APNEA): ICD-10-CM

## 2023-01-16 DIAGNOSIS — I51.7 LVH (LEFT VENTRICULAR HYPERTROPHY): ICD-10-CM

## 2023-01-16 DIAGNOSIS — I50.32 CHRONIC DIASTOLIC (CONGESTIVE) HEART FAILURE (HCC): ICD-10-CM

## 2023-01-16 DIAGNOSIS — Z95.2 S/P TAVR (TRANSCATHETER AORTIC VALVE REPLACEMENT): ICD-10-CM

## 2023-01-16 DIAGNOSIS — I27.20 PULMONARY HYPERTENSION (HCC): ICD-10-CM

## 2023-01-16 DIAGNOSIS — E78.2 MIXED HYPERLIPIDEMIA: ICD-10-CM

## 2023-01-16 DIAGNOSIS — E66.01 MORBID OBESITY (HCC): ICD-10-CM

## 2023-01-16 DIAGNOSIS — I35.0 SEVERE AORTIC STENOSIS: Primary | ICD-10-CM

## 2023-01-16 DIAGNOSIS — I10 ESSENTIAL HYPERTENSION: ICD-10-CM

## 2023-01-16 DIAGNOSIS — I34.2 NON-RHEUMATIC MITRAL VALVE STENOSIS: ICD-10-CM

## 2023-01-16 DIAGNOSIS — I50.9 ACUTE EXACERBATION OF CHF (CONGESTIVE HEART FAILURE) (HCC): ICD-10-CM

## 2023-01-16 RX ORDER — FUROSEMIDE 20 MG/1
20 TABLET ORAL DAILY PRN
Qty: 90 TABLET | Refills: 3
Start: 2023-01-16 | End: 2023-02-15

## 2023-01-16 NOTE — PROGRESS NOTES
CARDIOLOGY OFFICE VISIT  [facility]  [address], [place], [address]  Tel: [phone]      NAME: Artemio Davis  AGE: 79 y o  SEX: female  : 1940  MRN: [id number]      Chief Complaint:  Chief Complaint   Patient presents with   • Follow-up         History of Present Illness:   Patient comes for follow up  States she is doing okay from cardiac stand point and denies chest pain / pressure, SOB, palpitations, lightheadedness, syncope, swelling feet, orthopnea, PND, claudication  Severe aortic stenosis S/P TAVR on 10/9/18 -  On ASA   Patient is aware of need for antibiotic prophylaxis prior to dental work     Chronic diastolic congestive heart failure -  Currently euvolemic   On furosemide prn only, Olmesartan  Beta-blocker was held due to persistent SOB  States she watches her salt intake very closely    Essential hypertension, LVH -  Has been hypertensive for many years  Taking medications regularly  Denies lightheadedness, headache, medication side effects  Mixed hyperlipidemia -  Has had hyperlipidemia for many years  Taking statin regularly along with diet control  Denies myalgia  PCP closely monitoring the blood work  Mitral stenosis -  Stable  Follow-up with serial echocardiograms     PHTN - from JANICE, valvular disease     Morbid obesity -  Has lost about 30 lbs weight over the last 7 m     JANICE - now uses CPAP   She does have history of pulmonary hypertension       Past Medical History:  Past Medical History:   Diagnosis Date   • Anemia 2018   • Anxiety    • Arthritis    • AVB (atrioventricular block)     first degree   • Cataract    • CHF (congestive heart failure) (HCC)    • COPD, mild (HCC)    • Coronary artery disease    • Dislocation of right shoulder joint    • Frequent UTI    • GERD (gastroesophageal reflux disease)    • H/O: pneumonia    • Heme positive stool    • Hyperlipidemia    • Hypertension    • Hypothyroidism    • Morbid obesity with BMI of 50 0-59 9, adult ([facility] )    • Obesity, morbid ([facility] ) 08/22/2018   • JANICE on CPAP    • Pulmonary hypertension ([facility] ) 08/22/2018   • Severe aortic stenosis    • Simple goiter    • Skin cyst     within the armpits, right   • Wears glasses          Past Surgical History:  Past Surgical History:   Procedure Laterality Date   • BREAST BIOPSY     • CARDIAC CATHETERIZATION     • CARPAL TUNNEL RELEASE Bilateral    • CHOLECYSTECTOMY     • DILATION AND CURETTAGE OF UTERUS     • HYSTEROSCOPY     • MASTOID SURGERY     • UT COLONOSCOPY FLX DX W/COLLJ SPEC WHEN PFRMD N/A 9/6/2018    Procedure: COLONOSCOPY;  Surgeon: Edward Rodriguez MD;  Location: [facility]; Service: Gastroenterology   • UT ECHO TRANSESOPHAG R-T 2D W/PRB IMG ACQUISJ I&R N/A 10/9/2018    Procedure: INTRA-OP TRANSESOPHAGEAL ECHOCARDIOGRAM (GARRISON); Surgeon: Jael Lr DO;  Location: [facility];  Service: Cardiac Surgery   • UT ESOPHAGOGASTRODUODENOSCOPY TRANSORAL DIAGNOSTIC N/A 8/31/2018    Procedure: ESOPHAGOGASTRODUODENOSCOPY (EGD); Surgeon: Edward Rodriguez MD;  Location: [facility]; Service: Gastroenterology   • UT REPLACE AORTIC VALVE OPENFEMORAL ARTERY APPROACH N/A 10/9/2018    Procedure: REPLACEMENT AORTIC VALVE TRANSCATHETER (TAVR) TRANSFEMORAL W/ 23 MM MENDOZA NOE S3 VALVE (ACCESS OF LEFT);   Surgeon: Jael Lr DO;  [facility]: [facility];  Service: Cardiac Surgery   • TOTAL HIP ARTHROPLASTY Left 2007   • TOTAL KNEE ARTHROPLASTY Bilateral          Family History:  Family History   Problem Relation Age of Onset   • Diabetes Mother    • Stroke Mother    • Cancer Father    • Lung cancer Father    • Diabetes Sister    • Heart disease Sister    • Hypertension Sister    • Coronary artery disease Family    • Diabetes Family    • Hypertension Family    • Cancer Family    • Stroke Family    • Thyroid disease Neg Hx          Social History:  Social History     Socioeconomic History • Marital status: Single     Spouse name: None   • Number of children: None   • Years of education: None   • Highest education level: None   Occupational History   • Occupation: retired   Tobacco Use   • Smoking status: Never   • Smokeless tobacco: Never   Vaping Use   • Vaping Use: Never used   Substance and Sexual Activity   • Alcohol use: No   • Drug use: No   • Sexual activity: Never   Other Topics Concern   • None   Social History Narrative    Denied drinking coffee    Denied exercise habits    Most recent tobacco use screenin2018      Do you currently or have you served in [employer]:   No      Were you activated, into active duty, as a member of the [employer] or as a Reservist:   No          Social Determinants of Health     Financial Resource Strain: Medium Risk   • Difficulty of Paying Living Expenses: Somewhat hard   Food Insecurity: Not on file   Transportation Needs: No Transportation Needs   • Lack of Transportation (Medical): No   • Lack of Transportation (Non-Medical):  No   Physical Activity: Not on file   Stress: Not on file   Social Connections: Not on file   Intimate Partner Violence: Not on file   Housing Stability: Not on file         Active Problems:  Patient Active Problem List   Diagnosis   • Hypothyroid   • Hypertension   • Hyperlipidemia   • Reactive airway disease without complication   • JANICE (obstructive sleep apnea)   • LVH (left ventricular hypertrophy)   • Mitral annular calcification   • Mitral valve stenosis   • Pulmonary hypertension (HCC)   • Chronic diastolic (congestive) heart failure (HCC)   • Anemia   • Obesity, morbid (HCC)   • Gastroesophageal reflux disease without esophagitis   • Arthritis   • AVB (atrioventricular block)   • COPD, mild ([facility])   • Coronary artery disease   • JANICE on CPAP   • S/P TAVR (transcatheter aortic valve replacement)   • Depression with anxiety   • Insomnia   • Osteopenia   • Depression, recurrent (HCC)   • Radiculopathy, lumbar region   • 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[facility] )   • Stage 3a chronic kidney disease ([facility] )   • Urinary incontinence         The following portions of the patient's history were reviewed and updated as appropriate: past medical history, past surgical history, past family history,  past social history, current medications, allergies and problem list       Review of Systems:  Constitutional: Denies fever, chills  Eyes: Denies eye redness, eye discharge  ENT: Denies hearing loss, sneezing, nasal discharge, sore throat   Respiratory: Denies cough, expectoration, shortness of breath  Cardiovascular: Denies chest pain, palpitations, lower extremity swelling  Gastrointestinal: Denies abdominal pain, nausea, vomiting, diarrhea  Genito-Urinary: Denies dysuria, incontinence  Musculoskeletal: Denies back pain, joint pain, muscle pain  Neurologic: Denies lightheadedness, syncope, headache, seizures  Endocrine: Denies polydipsia, temperature intolerance  Allergy and Immunology: Denies hives, insect bite sensitivity  Hematological and Lymphatic: Denies bleeding problems, swollen glands   Psychological: Denies depression, suicidal ideation, anxiety, panic  Dermatological: Denies pruritus, rash, skin lesion changes      Vitals:  Vitals:    01/16/23 1546   BP: 120/60   Pulse: (!) 54   Resp: 18   SpO2: 96%       Body mass index is 42 3 kg/m²  Weight (last 2 days)     Date/Time Weight    01/16/23 1546 82 6 (182)            Physical Examination:  General: Patient is not in acute distress  Awake, alert, oriented in time, place and person  Responding to commands  Morbidly obese  Using a walker for support  Head: Normocephalic  Atraumatic  Eyes: Both pupils normal sized, round and reactive to light  Nonicteric  ENT: Normal external ear canals  Neck: Supple  JVP not raised  Trachea central  No thyromegaly  Lungs: Bilateral bronchovascular breath sounds with no crackles or rhonchi  Chest wall: No tenderness  Cardiovascular: RRR   S1 and S2 normal  No murmur, rub or gallop  Gastrointestinal: Abdomen soft, nontender  No guarding or rigidity  Bowel sounds present  Neurologic: Patient is awake, alert, oriented in time, place and person  Responding to commands  Moving all extremities  Integumentary:  No skin rash  Lymphatic: No cervical lymphadenopathy  Back: Symmetric  No CVA tenderness  Extremities: No clubbing, cyanosis or edema      Laboratory Results:  CBC with diff:   Lab Results   Component Value Date    WBC 10 13 12/19/2022    RBC 3 63 (L) 12/19/2022    HGB 10 2 (L) 12/19/2022    HCT 33 5 (L) 12/19/2022    MCV 92 12/19/2022    MCH 28 1 12/19/2022    RDW 15 1 12/19/2022     12/19/2022       CMP:  Lab Results   Component Value Date    CREATININE 1 08 12/19/2022    BUN 40 (H) 12/19/2022    K 4 5 12/19/2022     (H) 12/19/2022    CO2 24 12/19/2022    CO2 32 10/09/2018    GLUCOSE 101 10/09/2018    ALKPHOS 81 12/19/2022    ALT 26 12/19/2022    AST 22 12/19/2022       Lab Results   Component Value Date    HGBA1C 5 7 09/27/2018    MG 2 0 06/05/2018       Lab Results   Component Value Date    TROPONINI <0 02 08/14/2018    TROPONINI <0 02 08/14/2018    TROPONINI <0 02 08/13/2018       Cardiac testing:   Results for orders placed during the hospital encounter of 06/22/22    Echo complete w/ contrast if indicated    Interpretation Summary  •  Left Ventricle: Left ventricular cavity size is normal  Wall thickness is mildly increased  There is mild concentric hypertrophy  Systolic function is normal - 60%  Wall motion is normal  Diastolic function is mildly abnormal, consistent with grade I (abnormal) relaxation  •  Right Ventricle: Right ventricular cavity size is normal  Systolic function is normal   •  Aortic Valve: There is an Cornelius NOE 3 23 mm TAVR bioprosthetic valve  Mean pressure gradient is 19 mm Hg  •  Mitral Valve: There is severe annular calcification  There is moderate stenosis  •  Tricuspid Valve: There is mild regurgitation  The right ventricular systolic pressure is mildly to moderately elevated (55 mm Hg)  Results for orders placed during the hospital encounter of 17    NM myocardial perfusion spect (rx stress and/or rest)    [address], [address] [facility]  [phone]    Rest/Stress Gated SPECT Myocardial Perfusion Imaging After Regadenoson    Patient: Jami Burgos  MR number: [id number]  Account number: [de-identified]  : 1940  Age: 67 years  Gender: Female  Status: Outpatient  Location: [facility]  Height: 53 in  Weight: 214 lb  BP: 156/ 96 mmHg    Allergies: LATEX, NEOMYCIN-BACITRACIN ZN-POLYMYX    Diagnosis: R07 9 - Chest pain, unspecified    Primary Physician:  Papo Frederick MD  Interpreting Physician:  Tiara Powell MD  Referring Physician:  Tiara Powell MD  Technician:  Doug Santos BS, BA, AART(N)  Group:  [facility]  Other:  Lilian Delacruz MS, CCT    INDICATIONS: Detection of coronary artery disease  HISTORY: The patient is a 67year old  female  Chest pain status: chest pain  Coronary artery disease risk factors: dyslipidemia, family history of premature coronary artery disease, and post-menopausal state  Cardiovascular  history: aortic valve disease  Co-morbidity: obesity  Medications: aspirin, a lipid lowering agent, and thyroid medications  REST ECG: Normal sinus rhythm  Poor R wave progression in precordial leads  PROCEDURE: The study was performed at [address] [place] [address]  A regadenoson infusion pharmacologic stress test was performed  Gated SPECT myocardial perfusion imaging was performed after stress and at rest  Systolic blood pressure  was 156 mmHg, at the start of the study  Diastolic blood pressure was 96 mmHg, at the start of the study  The heart rate was 75 bpm, at the start of the study   Patient was not experiencing chest pain at the time of the injection of the  radiopharmaceutical   Regadenoson protocol:  HR bpm SBP mmHg DBP mmHg Symptoms ST change Rhythm/conduct  Baseline 75 156 96 none none NSR, no ectopy, rare PVC's  Immediate 91 138 78 -- -- occasional PVC's  1 min 89 -- -- headache -- occasional PVC's  2 min 87 132 78 -- -- --  No medications or fluids given  IV aminophylline was administered  STRESS SUMMARY: Duration of pharmacologic stress was 3 min  Maximal heart rate during stress was 91 bpm  The heart rate response to stress was normal  There was normal resting blood pressure with an appropriate response to stress  The  rate-pressure product for the peak heart rate and blood pressure was 10979  There was no chest pain during stress  The stress test was terminated due to protocol completion  The stress ECG was negative for ischemia  Arrhythmia during  stress: isolated premature ventricular beats  ISOTOPE ADMINISTRATION:  Resting isotope administration Stress isotope administration  Agent Sestamibi Sestamibi  Dose 15 62 mCi 47 1 mCi  Date 11/01/2017 11/01/2017  Injection-image interval 30 min 45 min    The radiopharmaceutical was injected at the peak effect of pharmacologic stress  MYOCARDIAL PERFUSION IMAGING:  The image quality was good  Left ventricular size was normal     PERFUSION DEFECTS:  -  There were no perfusion defects  GATED SPECT:  The calculated left ventricular ejection fraction was 61 %  Left ventricular ejection fraction was within normal limits by visual estimate  There was no diagnostic evidence for left ventricular regional abnormality  SUMMARY:  -  Stress results: There was no chest pain during stress  -  ECG conclusions: The stress ECG was negative for ischemia  Arrhythmia during stress: isolated premature ventricular beats  -  Perfusion imaging: There were no perfusion defects   -  Gated SPECT: The calculated left ventricular ejection fraction was 61 %   Left ventricular ejection fraction was within normal limits by visual estimate  There was no diagnostic evidence for left ventricular regional abnormality  IMPRESSIONS: Normal study after pharmacologic stress  Myocardial perfusion imaging was normal at rest and with stress   Left ventricular systolic function was normal     Prepared and signed by    Robert Fischer MD  Signed 11/01/2017 17:31:28      Medications:    Current Outpatient Medications:   •  albuterol (2 5 mg/3 mL) 0 083 % nebulizer solution, Take 3 mL (2 5 mg total) by nebulization every 6 (six) hours as needed for wheezing or shortness of breath (Patient taking differently: Take 2 5 mg by nebulization every 6 (six) hours as needed for wheezing or shortness of breath PRN), Disp: 360 mL, Rfl: 5  •  albuterol (PROVENTIL HFA,VENTOLIN HFA) 90 mcg/act inhaler, Inhale 2 puffs every 6 (six) hours as needed for wheezing, Disp: 1 Inhaler, Rfl: 3  •  aspirin (ECOTRIN LOW STRENGTH) 81 mg EC tablet, Take 1 tablet (81 mg total) by mouth daily, Disp: 100 tablet, Rfl: 0  •  b complex vitamins capsule, Take 1 capsule by mouth 2 (two) times a day  , Disp: , Rfl:   •  budesonide-formoterol (Symbicort) 160-4 5 mcg/act inhaler, Inhale 2 puffs 2 (two) times a day, Disp: 10 2 g, Rfl: 3  •  Calcium Carb-Cholecalciferol (CALCIUM 600 + D PO), Take 1 tablet by mouth 2 (two) times a day, Disp: , Rfl:   •  Cranberry 250 MG TABS, Take by mouth, Disp: , Rfl:   •  Diclofenac Sodium (VOLTAREN) 1 %, Apply 2 g topically 4 (four) times a day, Disp: 50 g, Rfl: 0  •  Fesoterodine Fumarate ER (Toviaz) 8 MG TB24, Take 8 mg by mouth daily , Disp: , Rfl:   •  furosemide (LASIX) 20 mg tablet, TAKE 1 TABLET (20 MG TOTAL) BY MOUTH DAILY IN THE MORNING (Patient taking differently: Take 20 mg by mouth daily prn), Disp: 90 tablet, Rfl: 3  •  gabapentin (Neurontin) 100 mg capsule, Take 1 capsule (100 mg total) by mouth 3 (three) times a day as needed (Sciatica pain) for up to 30 doses, Disp: 30 capsule, Rfl: 0  •  levothyroxine 50 mcg tablet, TAKE 1 TABLET BY MOUTH EVERY DAY, Disp: 90 tablet, Rfl: 1  •  lidocaine (LIDODERM) 5 %, Apply 1 patch topically daily Remove & Discard patch within 12 hours or as directed by MD, Disp: 30 patch, Rfl: 0  •  mometasone (ELOCON) 0 1 % cream, APPLY TO THE RIGHT EAR CANAL TWICE DAILY FOR 2 WEEKS, THEN DECREASE TO ONCE AT BEDTIME OR AS NEEDED, Disp: 45 g, Rfl: 0  •  ofloxacin (FLOXIN) 0 3 % otic solution, Administer 5 drops into the left ear 2 (two) times a day, Disp: 10 mL, Rfl: 1  •  olmesartan (BENICAR) 5 mg tablet, TAKE 1 TABLET BY MOUTH EVERY DAY, Disp: 90 tablet, Rfl: 3  •  omeprazole (PriLOSEC) 40 MG capsule, TAKE 1 CAPSULE BY MOUTH TWICE A DAY, Disp: 180 capsule, Rfl: 1  •  oxybutynin (DITROPAN-XL) 10 MG 24 hr tablet, Take 10 mg by mouth daily, Disp: , Rfl:   •  sertraline (ZOLOFT) 100 mg tablet, TAKE 1 TABLET BY MOUTH EVERY DAY, Disp: 90 tablet, Rfl: 0  •  simvastatin (ZOCOR) 40 mg tablet, TAKE 1 TABLET BY MOUTH DAILY AT BEDTIME, Disp: 90 tablet, Rfl: 1  •  traMADol (Ultram) 50 mg tablet, Take 1 tablet (50 mg total) by mouth daily as needed for moderate pain, Disp: 30 tablet, Rfl: 1      Allergies: Allergies   Allergen Reactions   • Latex Rash   • Neosporin [Neomycin-Bacitracin Zn-Polymyx] Rash and Other (See Comments)     hives per [facility] order         Assessment and Plan:  1  Severe aortic stenosis S/P TAVR (transcatheter aortic valve replacement)  Continue aspirin  Patient is aware of need for antibiotic prophylaxis prior to dental work  2  Chronic diastolic (congestive) heart failure (HCC)  Euvolemic  On furosemide as needed  Continue olmesartan  Her beta-blocker was discontinued due to her persistent shortness of breath  Low-salt diet  Daily weight    3  Essential hypertension  LVH (left ventricular hypertrophy)  BP normal   Continue current medications  Continue to monitor BP at home and call if abnormal    4  Mixed hyperlipidemia  Continue statin and diet control  Her PCP closely monitors her blood work    5  Non-rheumatic mitral valve stenosis  Being followed with serial echocardiograms at recommended intervals    6  Pulmonary hypertension ([address] )  Follow-up with pulmonologist    7  Morbid obesity ([address] )  Continue to lose weight until you reach goal BMI    8  JANICE (obstructive sleep apnea)  Continue to use CPAP regularly    Recommend aggressive risk factor modification and therapeutic lifestyle changes  Low-salt, low-calorie, low-fat, low-cholesterol diet with regular exercise and to optimize weight  I will defer the ordering and monitoring of necessity lab studies to you, but I am available and happy to review and manage any of the data at your request in the future  Discussed concepts of atherosclerosis, including signs and symptoms of cardiac disease  Previous studies were reviewed  Safety measures were reviewed  Questions were entertained and answered  Patient was advised to report any problems requiring medical attention  Follow-up with PCP and appropriate specialist and lab work as discussed  Return for follow up visit as scheduled or earlier, if needed  Thank you for allowing me to participate in the care and evaluation of your patient  Should you have any questions, please feel free to contact me        Jason Green MD  2/47/0477,7:41 PM

## 2023-01-17 NOTE — PROGRESS NOTES
Pain Medicine Follow-Up Note    Assessment:  1  Chronic pain syndrome    2  Spinal stenosis of lumbar region with neurogenic claudication    3  Stage 3a chronic kidney disease ([facility] [address] )    4  Chronic bilateral low back pain with left-sided sciatica    5  Lumbar radiculopathy        Plan:  Orders Placed This Encounter   Procedures   • FL spine and pain procedure     Standing Status:   Future     Standing Expiration Date:   1/20/2027     Order Specific Question:   Reason for Exam:     Answer:   L5-S1 LESI     Order Specific Question:   Anticoagulant hold needed? Answer:   no         My impressions and treatment recommendations were discussed in detail with the patient who verbalized understanding and had no further questions  Patient presents the office following up on a right glenohumeral joint injection that she received on 12/1/2022  Patient reports that she received excellent pain relief for weeks after the injection however she still feels she is receiving some benefit but has sharp pain occasionally depending on how she moves her arm  Patient states now her low back pain has worsened  On 9/20/2022 patient had an L5-S1 lumbar epidural steroid injection which provided her great relief of her pain therefore at this time I recommend that she repeat this injection  Patient reports that she stopped taking tramadol due to it causing her drowsiness as well as her finding it ineffective  Upon review of the patient's medications I see in the past she has used Flexeril, gabapentin, meloxicam without benefit  I explained to the patient that all opioid medications carry the side effect of drowsiness  Patient is agreeable to epidural injection  Complete risks and benefits including bleeding, infection, tissue reaction, nerve injury and allergic reaction were discussed  The approach was demonstrated using models and literature was provided  Verbal and written consent was obtained      Follow-up is planned in 4 weeks after injection time or sooner as warranted  Discharge instructions were provided  I personally saw and examined the patient and I agree with the above discussed plan of care  History of Present Illness:    Janett Crabtree is a 79 y o  female who presents to [facility] for interval re-evaluation of the above stated pain complaints  The patient has a past medical and chronic pain history as outlined in the assessment section  She was last seen on 12/1/2022  At today's visit patient states that her pain symptoms are better with a pain score of 5 out of 10 on the verbal numeric pain scale  The patient had a glenohumeral joint injection of the right shoulder on 12/1/2022 which provided the patient with weeks of excellent pain relief and states it is ongoing with occasional sharp pains  The patient's pain is occasional in nature  The quality of the patient's pain is described as sharp and shooting  Patient indicates that her pain is located in her right shoulder, bilateral low back left buttock going into the left hip  Other than as stated above, the patient denies any interval changes in medications, medical condition, mental condition, symptoms, or allergies since the last office visit  Review of Systems:    Review of Systems   Respiratory: Negative for shortness of breath  Cardiovascular: Negative for chest pain  Gastrointestinal: Negative for constipation, diarrhea, nausea and vomiting  Musculoskeletal: Positive for arthralgias, gait problem and myalgias  Negative for joint swelling  Decreased ROM      Skin: Negative for rash  Neurological: Negative for dizziness, seizures and weakness  All other systems reviewed and are negative          Past Medical History:   Diagnosis Date   • Anemia 08/22/2018   • Anxiety    • Arthritis    • AVB (atrioventricular block)     first degree   • Cataract    • CHF (congestive heart failure) (HCC)    • COPD, mild [facility])    • Coronary artery disease    • Dislocation of right shoulder joint    • Frequent UTI    • GERD (gastroesophageal reflux disease)    • H/O: pneumonia    • Heme positive stool    • Hyperlipidemia    • Hypertension    • Hypothyroidism    • Morbid obesity with BMI of 50 0-59 9, adult ([facility] [address] )    • Obesity, morbid ([facility] [address] ) 08/22/2018   • JANICE on CPAP    • Pulmonary hypertension ([facility] ) 08/22/2018   • Severe aortic stenosis    • Simple goiter    • Skin cyst     within the armpits, right   • Wears glasses        Past Surgical History:   Procedure Laterality Date   • BREAST BIOPSY     • CARDIAC CATHETERIZATION     • CARPAL TUNNEL RELEASE Bilateral    • CHOLECYSTECTOMY     • DILATION AND CURETTAGE OF UTERUS     • HYSTEROSCOPY     • MASTOID SURGERY     • SC COLONOSCOPY FLX DX W/COLLJ SPEC WHEN PFRMD N/A 9/6/2018    Procedure: COLONOSCOPY;  Surgeon: Valerie Noyola MD;  Location: [facility]; Service: Gastroenterology   • SC ECHO TRANSESOPHAG R-T 2D W/PRB IMG ACQUISJ I&R N/A 10/9/2018    Procedure: INTRA-OP TRANSESOPHAGEAL ECHOCARDIOGRAM (GARRISON); Surgeon: Ligia Villa DO;  Location: [facility];  Service: Cardiac Surgery   • SC ESOPHAGOGASTRODUODENOSCOPY TRANSORAL DIAGNOSTIC N/A 8/31/2018    Procedure: ESOPHAGOGASTRODUODENOSCOPY (EGD); Surgeon: Valerie Noyola MD;  Location: [facility]; Service: Gastroenterology   • SC REPLACE AORTIC VALVE OPENFEMORAL ARTERY APPROACH N/A 10/9/2018    Procedure: REPLACEMENT AORTIC VALVE TRANSCATHETER (TAVR) TRANSFEMORAL W/ 23 MM MENDOZA NOE S3 VALVE (ACCESS OF LEFT);   Surgeon: Ligia Villa DO;  [facility]: [facility];  Service: Cardiac Surgery   • TOTAL HIP ARTHROPLASTY Left 2007   • TOTAL KNEE ARTHROPLASTY Bilateral        Family History   Problem Relation Age of Onset   • Diabetes Mother    • Stroke Mother    • Cancer Father    • Lung cancer Father    • Diabetes Sister    • Heart disease Sister    • Hypertension Sister    • Coronary artery disease Family    • Diabetes Family    • Hypertension Family    • Cancer Family    • Stroke Family    • Thyroid disease Neg Hx        Social History     Occupational History   • Occupation: retired   Tobacco Use   • Smoking status: Never   • Smokeless tobacco: Never   Vaping Use   • Vaping Use: Never used   Substance and Sexual Activity   • Alcohol use: No   • Drug use: No   • Sexual activity: Never         Current Outpatient Medications:   •  albuterol (2 5 mg/3 mL) 0 083 % nebulizer solution, Take 3 mL (2 5 mg total) by nebulization every 6 (six) hours as needed for wheezing or shortness of breath (Patient taking differently: Take 2 5 mg by nebulization every 6 (six) hours as needed for wheezing or shortness of breath PRN), Disp: 360 mL, Rfl: 5  •  albuterol (PROVENTIL HFA,VENTOLIN HFA) 90 mcg/act inhaler, Inhale 2 puffs every 6 (six) hours as needed for wheezing, Disp: 1 Inhaler, Rfl: 3  •  aspirin (ECOTRIN LOW STRENGTH) 81 mg EC tablet, Take 1 tablet (81 mg total) by mouth daily, Disp: 100 tablet, Rfl: 0  •  b complex vitamins capsule, Take 1 capsule by mouth 2 (two) times a day  , Disp: , Rfl:   •  budesonide-formoterol (Symbicort) 160-4 5 mcg/act inhaler, Inhale 2 puffs 2 (two) times a day, Disp: 10 2 g, Rfl: 3  •  Calcium Carb-Cholecalciferol (CALCIUM 600 + D PO), Take 1 tablet by mouth 2 (two) times a day, Disp: , Rfl:   •  Cranberry 250 MG TABS, Take by mouth, Disp: , Rfl:   •  Diclofenac Sodium (VOLTAREN) 1 %, Apply 2 g topically 4 (four) times a day, Disp: 50 g, Rfl: 0  •  Fesoterodine Fumarate ER (Toviaz) 8 MG TB24, Take 8 mg by mouth daily , Disp: , Rfl:   •  furosemide (LASIX) 20 mg tablet, Take 1 tablet (20 mg total) by mouth daily as needed (wt gain) In the morning, Disp: 90 tablet, Rfl: 3  •  levothyroxine 50 mcg tablet, TAKE 1 TABLET BY MOUTH EVERY DAY, Disp: 90 tablet, Rfl: 1  •  lidocaine (LIDODERM) 5 %, Apply 1 patch topically daily Remove & Discard patch within 12 hours or as directed by MD, Disp: 30 patch, Rfl: 0  • mometasone (ELOCON) 0 1 % cream, APPLY TO THE RIGHT EAR CANAL TWICE DAILY FOR 2 WEEKS, THEN DECREASE TO ONCE AT BEDTIME OR AS NEEDED, Disp: 45 g, Rfl: 0  •  ofloxacin (FLOXIN) 0 3 % otic solution, Administer 5 drops into the left ear 2 (two) times a day, Disp: 10 mL, Rfl: 1  •  olmesartan (BENICAR) 5 mg tablet, TAKE 1 TABLET BY MOUTH EVERY DAY, Disp: 90 tablet, Rfl: 3  •  omeprazole (PriLOSEC) 40 MG capsule, TAKE 1 CAPSULE BY MOUTH TWICE A DAY, Disp: 180 capsule, Rfl: 1  •  oxybutynin (DITROPAN-XL) 10 MG 24 hr tablet, Take 10 mg by mouth daily, Disp: , Rfl:   •  sertraline (ZOLOFT) 100 mg tablet, TAKE 1 TABLET BY MOUTH EVERY DAY, Disp: 90 tablet, Rfl: 0  •  simvastatin (ZOCOR) 40 mg tablet, TAKE 1 TABLET BY MOUTH DAILY AT BEDTIME, Disp: 90 tablet, Rfl: 1    Allergies   Allergen Reactions   • Latex Rash   • Neosporin [Neomycin-Bacitracin Zn-Polymyx] Rash and Other (See Comments)     hives per [facility] order       Physical Exam:    /61 (BP Location: Left arm, Patient Position: Sitting, Cuff Size: Standard)   Pulse 57   Ht 4' 7" (1 397 m)   Wt 82 8 kg (182 lb 9 6 oz)   BMI 42 44 kg/m²     Constitutional:normal, well developed, well nourished, alert, in no distress and non-toxic and no overt pain behavior  and obese  Eyes:anicteric  HEENT:grossly intact  Neck:supple, symmetric, trachea midline and no masses   Pulmonary:even and unlabored  Cardiovascular:No edema or pitting edema present  Skin:Normal without rashes or lesions and well hydrated  Psychiatric:Mood and affect appropriate  Neurologic:Cranial Nerves II-XII grossly intact  Musculoskeletal:antalgic ambulates with a rolling walker        Imaging  FL spine and pain procedure    (Results Pending)         Orders Placed This Encounter   Procedures   • FL spine and pain procedure     This document was created using speech voice recognition software     Grammatical errors, random word insertions, pronoun errors, and incomplete sentences are an occasional consequence of this system due to software limitations, ambient noise, and hardware issues  Any formal questions or concerns about content, text, or information contained within the body of this dictation should be directly addressed to the provider for clarification

## 2023-01-20 ENCOUNTER — OFFICE VISIT (OUTPATIENT)
Dept: PAIN MEDICINE | Facility: CLINIC | Age: 83
End: 2023-01-20

## 2023-01-20 VITALS
WEIGHT: 182.6 LBS | BODY MASS INDEX: 42.26 KG/M2 | HEIGHT: 55 IN | SYSTOLIC BLOOD PRESSURE: 134 MMHG | DIASTOLIC BLOOD PRESSURE: 61 MMHG | HEART RATE: 57 BPM

## 2023-01-20 DIAGNOSIS — M54.16 LUMBAR RADICULOPATHY: ICD-10-CM

## 2023-01-20 DIAGNOSIS — G89.29 CHRONIC BILATERAL LOW BACK PAIN WITH LEFT-SIDED SCIATICA: ICD-10-CM

## 2023-01-20 DIAGNOSIS — M54.42 CHRONIC BILATERAL LOW BACK PAIN WITH LEFT-SIDED SCIATICA: ICD-10-CM

## 2023-01-20 DIAGNOSIS — M48.062 SPINAL STENOSIS OF LUMBAR REGION WITH NEUROGENIC CLAUDICATION: ICD-10-CM

## 2023-01-20 DIAGNOSIS — N18.31 STAGE 3A CHRONIC KIDNEY DISEASE (HCC): ICD-10-CM

## 2023-01-20 DIAGNOSIS — G89.4 CHRONIC PAIN SYNDROME: Primary | ICD-10-CM

## 2023-01-20 NOTE — PATIENT INSTRUCTIONS
Epidural Steroid Injection, Ambulatory Care   GENERAL INFORMATION:   What do I need to know about an epidural steroid injection? An epidural steroid injection (SYMONE) is a procedure to inject steroid medicine into the epidural space  The epidural space is between your spinal cord and vertebrae  Steroids reduce inflammation and fluid buildup in your spine that may be causing pain  You may be given pain medicine along with the steroids  How do I prepare for an SYMONE? Your healthcare provider will talk to you about how to prepare for your procedure  He will tell you what medicines to take or not take on the day of your procedure  You may need to stop taking blood thinners or other medicines several days before your procedure  You may need to adjust any diabetes medicine you take on the day of your procedure  Steroid medicine can increase your blood sugar level  What will happen during an SYMONE? You will be given medicine to numb the procedure area  You will be awake for the procedure, but you will not feel pain  You may also be given medicine to help you relax during the procedure  Contrast liquid will be used to help your healthcare provider see the area better  Tell the healthcare provider if you have ever had an allergic reaction to contrast liquid  Your healthcare provider may place the needle into your neck area, middle of your back, or tailbone area  He may inject the medicine next to the nerves that are causing your pain  He may instead inject the medicine into a larger area of the epidural space  This helps the medicine spread to more nerves  Your healthcare provider will use a fluoroscope to help guide the needle to the right place  A fluoroscope is a type of x-ray  After the procedure, a bandage will be placed over the injection site to prevent infection  What are the risks of an SYMONE? You may have temporary or permanent nerve damage or paralysis   You may have bleeding or develop a serious infection, such as meningitis (swelling of the brain coverings)  An abscess may also develop  You may need surgery to fix the abscess  You may have a seizure, anxiety, or trouble sleeping  If you are a man, you may have temporary erectile dysfunction (not able to have an erection)  CARE AGREEMENT:   You have the right to help plan your care  Learn about your health condition and how it may be treated  Discuss treatment options with your caregivers to decide what care you want to receive  You always have the right to refuse treatment  The above information is an  only  It is not intended as medical advice for individual conditions or treatments  Talk to your doctor, nurse or pharmacist before following any medical regimen to see if it is safe and effective for you  © 2014 [address] is for End User's use only and may not be sold, redistributed or otherwise used for commercial purposes  All illustrations and images included in CareNotes® are the copyrighted property of A D [employer]  or [employer]chel [employer]

## 2023-01-23 ENCOUNTER — TELEPHONE (OUTPATIENT)
Dept: RADIOLOGY | Facility: CLINIC | Age: 83
End: 2023-01-23

## 2023-01-23 NOTE — TELEPHONE ENCOUNTER
----- Message from Cirstiane Lucio, [address] Jody Mcgill sent at 1/20/2023  6:48 PM EST -----  Regarding: mel  Please let the patient know that I spoke with Dr Kiarra Dao and he recommended a repeat L5-S1 lumbar epidural steroid injection  And let her know that Audrey Tavarez the surgical coordinator will be contacting her to schedule the appointment    Thank you

## 2023-03-07 ENCOUNTER — RA CDI HCC (OUTPATIENT)
Dept: OTHER | Facility: HOSPITAL | Age: 83
End: 2023-03-07

## 2023-03-07 NOTE — PROGRESS NOTES
[facility]  coding opportunities          Chart Reviewed number of suggestions sent to Provider: 1   [phone]    Patients Insurance     Medicare Insurance: [employer]

## 2023-03-14 ENCOUNTER — TELEPHONE (OUTPATIENT)
Dept: OTHER | Facility: OTHER | Age: 83
End: 2023-03-14

## 2023-03-14 NOTE — TELEPHONE ENCOUNTER
Patient is calling regarding cancelling an appointment      Date/Time:  3/14/23 @ 2pm  Patient was rescheduled: YES [x] NO []    Patient requesting call back to reschedule: YES [] NO [x]

## 2023-03-17 ENCOUNTER — APPOINTMENT (OUTPATIENT)
Dept: LAB | Facility: HOSPITAL | Age: 83
End: 2023-03-17

## 2023-03-17 DIAGNOSIS — Z00.00 HEALTHCARE MAINTENANCE: ICD-10-CM

## 2023-03-17 LAB
ALBUMIN SERPL BCP-MCNC: 4.2 G/DL (ref 3.5–5)
ALP SERPL-CCNC: 74 U/L (ref 34–104)
ALT SERPL W P-5'-P-CCNC: 21 U/L (ref 7–52)
ANION GAP SERPL CALCULATED.3IONS-SCNC: 8 MMOL/L (ref 4–13)
AST SERPL W P-5'-P-CCNC: 23 U/L (ref 13–39)
BACTERIA UR QL AUTO: ABNORMAL /HPF
BASOPHILS # BLD AUTO: 0.07 THOUSANDS/ÂΜL (ref 0–0.1)
BASOPHILS NFR BLD AUTO: 1 % (ref 0–1)
BILIRUB SERPL-MCNC: 0.39 MG/DL (ref 0.2–1)
BILIRUB UR QL STRIP: NEGATIVE
BUN SERPL-MCNC: 27 MG/DL (ref 5–25)
CALCIUM SERPL-MCNC: 10.5 MG/DL (ref 8.4–10.2)
CHLORIDE SERPL-SCNC: 106 MMOL/L (ref 96–108)
CHOLEST SERPL-MCNC: 142 MG/DL
CLARITY UR: CLEAR
CO2 SERPL-SCNC: 26 MMOL/L (ref 21–32)
COLOR UR: YELLOW
CREAT SERPL-MCNC: 0.8 MG/DL (ref 0.6–1.3)
EOSINOPHIL # BLD AUTO: 0.59 THOUSAND/ÂΜL (ref 0–0.61)
EOSINOPHIL NFR BLD AUTO: 8 % (ref 0–6)
ERYTHROCYTE [DISTWIDTH] IN BLOOD BY AUTOMATED COUNT: 15.1 % (ref 11.6–15.1)
EST. AVERAGE GLUCOSE BLD GHB EST-MCNC: 123 MG/DL
GFR SERPL CREATININE-BSD FRML MDRD: 68 ML/MIN/1.73SQ M
GLUCOSE P FAST SERPL-MCNC: 105 MG/DL (ref 65–99)
GLUCOSE UR STRIP-MCNC: NEGATIVE MG/DL
HBA1C MFR BLD: 5.9 %
HCT VFR BLD AUTO: 41.6 % (ref 34.8–46.1)
HDLC SERPL-MCNC: 66 MG/DL
HGB BLD-MCNC: 12.7 G/DL (ref 11.5–15.4)
HGB UR QL STRIP.AUTO: NEGATIVE
IMM GRANULOCYTES # BLD AUTO: 0.02 THOUSAND/UL (ref 0–0.2)
IMM GRANULOCYTES NFR BLD AUTO: 0 % (ref 0–2)
KETONES UR STRIP-MCNC: NEGATIVE MG/DL
LDLC SERPL CALC-MCNC: 58 MG/DL (ref 0–100)
LEUKOCYTE ESTERASE UR QL STRIP: ABNORMAL
LYMPHOCYTES # BLD AUTO: 2.15 THOUSANDS/ÂΜL (ref 0.6–4.47)
LYMPHOCYTES NFR BLD AUTO: 29 % (ref 14–44)
MCH RBC QN AUTO: 27 PG (ref 26.8–34.3)
MCHC RBC AUTO-ENTMCNC: 30.5 G/DL (ref 31.4–37.4)
MCV RBC AUTO: 88 FL (ref 82–98)
MONOCYTES # BLD AUTO: 0.57 THOUSAND/ÂΜL (ref 0.17–1.22)
MONOCYTES NFR BLD AUTO: 8 % (ref 4–12)
NEUTROPHILS # BLD AUTO: 3.93 THOUSANDS/ÂΜL (ref 1.85–7.62)
NEUTS SEG NFR BLD AUTO: 54 % (ref 43–75)
NITRITE UR QL STRIP: NEGATIVE
NON-SQ EPI CELLS URNS QL MICRO: ABNORMAL /HPF
NONHDLC SERPL-MCNC: 76 MG/DL
NRBC BLD AUTO-RTO: 0 /100 WBCS
PH UR STRIP.AUTO: 5.5 [PH]
PLATELET # BLD AUTO: 387 THOUSANDS/UL (ref 149–390)
PMV BLD AUTO: 10.2 FL (ref 8.9–12.7)
POTASSIUM SERPL-SCNC: 4.6 MMOL/L (ref 3.5–5.3)
PROT SERPL-MCNC: 7.3 G/DL (ref 6.4–8.4)
PROT UR STRIP-MCNC: NEGATIVE MG/DL
RBC # BLD AUTO: 4.71 MILLION/UL (ref 3.81–5.12)
RBC #/AREA URNS AUTO: ABNORMAL /HPF
SODIUM SERPL-SCNC: 140 MMOL/L (ref 135–147)
SP GR UR STRIP.AUTO: 1.02 (ref 1–1.03)
TRIGL SERPL-MCNC: 89 MG/DL
UROBILINOGEN UR STRIP-ACNC: <2 MG/DL
WBC # BLD AUTO: 7.33 THOUSAND/UL (ref 4.31–10.16)
WBC #/AREA URNS AUTO: ABNORMAL /HPF

## 2023-03-19 LAB
BACTERIA UR CULT: ABNORMAL
BACTERIA UR CULT: ABNORMAL

## 2023-03-21 DIAGNOSIS — N39.0 URINARY TRACT INFECTION WITHOUT HEMATURIA, SITE UNSPECIFIED: Primary | ICD-10-CM

## 2023-03-21 RX ORDER — CIPROFLOXACIN 500 MG/1
500 TABLET, FILM COATED ORAL EVERY 12 HOURS SCHEDULED
Qty: 10 TABLET | Refills: 0 | Status: SHIPPED | OUTPATIENT
Start: 2023-03-21 | End: 2023-03-26

## 2023-03-22 ENCOUNTER — OFFICE VISIT (OUTPATIENT)
Dept: FAMILY MEDICINE CLINIC | Facility: CLINIC | Age: 83
End: 2023-03-22

## 2023-03-22 VITALS
HEART RATE: 57 BPM | OXYGEN SATURATION: 97 % | SYSTOLIC BLOOD PRESSURE: 110 MMHG | TEMPERATURE: 99.1 F | WEIGHT: 184 LBS | BODY MASS INDEX: 42.58 KG/M2 | DIASTOLIC BLOOD PRESSURE: 60 MMHG | HEIGHT: 55 IN

## 2023-03-22 DIAGNOSIS — J44.9 COPD, MILD (HCC): Chronic | ICD-10-CM

## 2023-03-22 DIAGNOSIS — R73.03 PREDIABETES: ICD-10-CM

## 2023-03-22 DIAGNOSIS — E78.5 HYPERLIPIDEMIA, UNSPECIFIED HYPERLIPIDEMIA TYPE: ICD-10-CM

## 2023-03-22 DIAGNOSIS — I50.32 CHRONIC DIASTOLIC (CONGESTIVE) HEART FAILURE (HCC): ICD-10-CM

## 2023-03-22 DIAGNOSIS — F33.9 DEPRESSION, RECURRENT (HCC): ICD-10-CM

## 2023-03-22 DIAGNOSIS — D44.7 GLOMUS TYMPANICUM TUMOR: ICD-10-CM

## 2023-03-22 DIAGNOSIS — I10 PRIMARY HYPERTENSION: Chronic | ICD-10-CM

## 2023-03-22 DIAGNOSIS — E66.01 OBESITY, MORBID (HCC): Chronic | ICD-10-CM

## 2023-03-22 DIAGNOSIS — E03.9 ACQUIRED HYPOTHYROIDISM: Primary | ICD-10-CM

## 2023-03-22 DIAGNOSIS — J96.11 CHRONIC HYPOXEMIC RESPIRATORY FAILURE (HCC): ICD-10-CM

## 2023-03-22 DIAGNOSIS — Z95.2 S/P TAVR (TRANSCATHETER AORTIC VALVE REPLACEMENT): ICD-10-CM

## 2023-03-22 DIAGNOSIS — N39.0 URINARY TRACT INFECTION WITHOUT HEMATURIA, SITE UNSPECIFIED: ICD-10-CM

## 2023-03-22 PROBLEM — F11.20 UNCOMPLICATED OPIOID DEPENDENCE (HCC): Status: RESOLVED | Noted: 2023-03-22 | Resolved: 2023-03-22

## 2023-03-22 PROBLEM — F11.20 UNCOMPLICATED OPIOID DEPENDENCE (HCC): Status: ACTIVE | Noted: 2023-03-22

## 2023-03-22 RX ORDER — IBUPROFEN 200 MG
200 TABLET ORAL EVERY 6 HOURS PRN
COMMUNITY

## 2023-03-22 RX ORDER — CALCIUM CARBONATE/VITAMIN D3 600 MG-10
1 TABLET ORAL
COMMUNITY

## 2023-03-22 RX ORDER — ACETAMINOPHEN 500 MG
500 TABLET ORAL EVERY 6 HOURS PRN
COMMUNITY

## 2023-03-22 RX ORDER — MIRABEGRON 50 MG/1
1 TABLET, FILM COATED, EXTENDED RELEASE ORAL DAILY
COMMUNITY
Start: 2023-02-28 | End: 2023-03-22

## 2023-03-22 NOTE — PROGRESS NOTES
Assessment/Plan:     Chronic Problems:  Hypothyroid  Continue levothyroxine daily  Hypertension  Blood pressure is well controlled  Continue olmesartan daily  Hyperlipidemia  Lipid panel reviewed with patient  Continue simvastatin daily  Visit Diagnosis:  Diagnoses and all orders for this visit:    Acquired hypothyroidism  -     TSH, 3rd generation; Future    Hyperlipidemia, unspecified hyperlipidemia type  -     CBC and differential; Future  -     Lipid panel; Future    Prediabetes  -     CBC and differential; Future  -     Comprehensive metabolic panel; Future  -     Hemoglobin A1C; Future    Urinary tract infection without hematuria, site unspecified  -     UA w Reflex to Microscopic w Reflex to Culture -Lab Collect; Future    Depression, recurrent (HCC)    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[address] )    Chronic hypoxemic respiratory failure (HCC)    COPD, mild (HCC)    Primary hypertension    Chronic diastolic (congestive) heart failure (HCC)    Obesity, morbid (HCC)    S/P TAVR (transcatheter aortic valve replacement)    Other orders  -     Discontinue: Myrbetriq 50 MG TB24; Take 1 tablet by mouth daily  -     acetaminophen (TYLENOL) 500 mg tablet; Take 500 mg by mouth every 6 (six) hours as needed  -     ibuprofen (MOTRIN) 200 mg tablet; Take 200 mg by mouth every 6 (six) hours as needed  -     Ascorbic Acid, Vitamin C, (VITAMIN C) 100 MG tablet; Take 100 mg by mouth daily  -     Cranberry 1000 MG CAPS; Take by mouth  -     Calcium Carb-Cholecalciferol 600-10 MG-MCG TABS; Take 1 tablet by mouth          Subjective:    Patient ID: Bhumika Vogt is a 79 y o  female  Patient presents for routine follow-up  Patient is concerned with lack of mobility  Patient does not want to see pain management anymore as the injections did not help  She also does not want physical therapy        The following portions of the patient's history were reviewed and updated as appropriate: allergies, current medications, past family history, past medical history, past social history, past surgical history and problem list     Review of Systems   Constitutional: Negative for chills and fever  HENT: Positive for ear pain  Negative for sore throat  Eyes: Negative for pain and visual disturbance  Respiratory: Negative for cough and shortness of breath  Cardiovascular: Negative for chest pain and palpitations  Gastrointestinal: Negative for abdominal pain and vomiting  Genitourinary: Negative for dysuria and hematuria  Musculoskeletal: Positive for arthralgias  Negative for back pain  Skin: Negative for color change and rash  Neurological: Negative for dizziness, seizures, syncope, light-headedness and headaches  Psychiatric/Behavioral: Negative for dysphoric mood and sleep disturbance  The patient is not nervous/anxious  All other systems reviewed and are negative          /60 (BP Location: Left arm, Patient Position: Sitting, Cuff Size: Standard)   Pulse 57   Temp 99 1 °F (37 3 °C) (Tympanic)   Ht 4' 7" (1 397 m)   Wt 83 5 kg (184 lb)   SpO2 97%   BMI 42 77 kg/m²   Social History     Socioeconomic History   • Marital status: Single     Spouse name: Not on file   • Number of children: Not on file   • Years of education: Not on file   • Highest education level: Not on file   Occupational History   • Occupation: retired   Tobacco Use   • Smoking status: Never   • Smokeless tobacco: Never   Vaping Use   • Vaping Use: Never used   Substance and Sexual Activity   • Alcohol use: No   • Drug use: No   • Sexual activity: Never   Other Topics Concern   • Not on file   Social History Narrative    Denied drinking coffee    Denied exercise habits    Most recent tobacco use screenin2018      Do you currently or have you served in the [employer]:   No      Were you activated, into active duty, as a member of the [employer] or as a Reservist:   No          Social Determinants of Health     Financial Resource Strain: Medium Risk   • Difficulty of Paying Living Expenses: Somewhat hard   Food Insecurity: Not on file   Transportation Needs: No Transportation Needs   • Lack of Transportation (Medical): No   • Lack of Transportation (Non-Medical): No   Physical Activity: Not on file   Stress: Not on file   Social Connections: Not on file   Intimate Partner Violence: Not on file   Housing Stability: Not on file     Past Medical History:   Diagnosis Date   • Anemia 08/22/2018   • Anxiety    • Arthritis    • AVB (atrioventricular block)     first degree   • Cataract    • CHF (congestive heart failure) (HCC)    • COPD, mild (HCC)    • Coronary artery disease    • Dislocation of right shoulder joint    • Frequent UTI    • GERD (gastroesophageal reflux disease)    • H/O: pneumonia    • Heme positive stool    • Hyperlipidemia    • Hypertension    • Hypothyroidism    • Morbid obesity with BMI of 50 0-59 9, adult ([facility] [address] )    • Obesity, morbid ([facility] [address] ) 08/22/2018   • JANICE on CPAP    • Pulmonary hypertension ([facility] [address] ) 08/22/2018   • Severe aortic stenosis    • Simple goiter    • Skin cyst     within the armpits, right   • Wears glasses      Family History   Problem Relation Age of Onset   • Diabetes Mother    • Stroke Mother    • Cancer Father    • Lung cancer Father    • Diabetes Sister    • Heart disease Sister    • Hypertension Sister    • Coronary artery disease Family    • Diabetes Family    • Hypertension Family    • Cancer Family    • Stroke Family    • Thyroid disease Neg Hx      Past Surgical History:   Procedure Laterality Date   • BREAST BIOPSY     • CARDIAC CATHETERIZATION     • CARPAL TUNNEL RELEASE Bilateral    • CHOLECYSTECTOMY     • DILATION AND CURETTAGE OF UTERUS     • HYSTEROSCOPY     • MASTOID SURGERY     • NM COLONOSCOPY FLX DX W/COLLJ SPEC WHEN PFRMD N/A 9/6/2018    Procedure: COLONOSCOPY;  Surgeon: Antelmo Tran MD;  Location: [facility];   Service: Gastroenterology   • NM ECHO TRANSESOPHAG R-T 2D W/PRB IMG NOLAN I&R N/A 10/9/2018    Procedure: INTRA-OP TRANSESOPHAGEAL ECHOCARDIOGRAM (GARRISON); Surgeon: Beverley Wisdom DO;  [facility]: [facility];  Service: Cardiac Surgery   • IL ESOPHAGOGASTRODUODENOSCOPY TRANSORAL DIAGNOSTIC N/A 8/31/2018    Procedure: ESOPHAGOGASTRODUODENOSCOPY (EGD); Surgeon: Vince Millan MD;  Location: [facility]; Service: Gastroenterology   • IL REPLACE AORTIC VALVE OPENFEMORAL ARTERY APPROACH N/A 10/9/2018    Procedure: REPLACEMENT AORTIC VALVE TRANSCATHETER (TAVR) TRANSFEMORAL W/ 23 MM MENDOZA NOE S3 VALVE (ACCESS OF LEFT);   Surgeon: Beverley Wisdom DO;  [facility];  Service: Cardiac Surgery   • TOTAL HIP ARTHROPLASTY Left 2007   • TOTAL KNEE ARTHROPLASTY Bilateral        Current Outpatient Medications:   •  albuterol (2 5 mg/3 mL) 0 083 % nebulizer solution, Take 3 mL (2 5 mg total) by nebulization every 6 (six) hours as needed for wheezing or shortness of breath (Patient taking differently: Take 2 5 mg by nebulization every 6 (six) hours as needed for wheezing or shortness of breath PRN), Disp: 360 mL, Rfl: 5  •  albuterol (PROVENTIL HFA,VENTOLIN HFA) 90 mcg/act inhaler, Inhale 2 puffs every 6 (six) hours as needed for wheezing, Disp: 1 Inhaler, Rfl: 3  •  Ascorbic Acid, Vitamin C, (VITAMIN C) 100 MG tablet, Take 100 mg by mouth daily, Disp: , Rfl:   •  aspirin (ECOTRIN LOW STRENGTH) 81 mg EC tablet, Take 1 tablet (81 mg total) by mouth daily, Disp: 100 tablet, Rfl: 0  •  b complex vitamins capsule, Take 1 capsule by mouth 2 (two) times a day  , Disp: , Rfl:   •  budesonide-formoterol (Symbicort) 160-4 5 mcg/act inhaler, Inhale 2 puffs 2 (two) times a day, Disp: 10 2 g, Rfl: 3  •  Calcium Carb-Cholecalciferol (CALCIUM 600 + D PO), Take 1 tablet by mouth 2 (two) times a day, Disp: , Rfl:   •  Calcium Carb-Cholecalciferol 600-10 MG-MCG TABS, Take 1 tablet by mouth, Disp: , Rfl:   •  ciprofloxacin (CIPRO) 500 mg tablet, Take 1 tablet (500 mg total) by mouth every 12 (twelve) hours for 5 days, Disp: 10 tablet, Rfl: 0  •  Cranberry 1000 MG CAPS, Take by mouth, Disp: , Rfl:   •  Cranberry 250 MG TABS, Take by mouth, Disp: , Rfl:   •  Diclofenac Sodium (VOLTAREN) 1 %, Apply 2 g topically 4 (four) times a day, Disp: 50 g, Rfl: 0  •  Fesoterodine Fumarate ER (Toviaz) 8 MG TB24, Take 8 mg by mouth daily , Disp: , Rfl:   •  ibuprofen (MOTRIN) 200 mg tablet, Take 200 mg by mouth every 6 (six) hours as needed, Disp: , Rfl:   •  levothyroxine 50 mcg tablet, TAKE 1 TABLET BY MOUTH EVERY DAY, Disp: 90 tablet, Rfl: 1  •  olmesartan (BENICAR) 5 mg tablet, TAKE 1 TABLET BY MOUTH EVERY DAY, Disp: 90 tablet, Rfl: 3  •  omeprazole (PriLOSEC) 40 MG capsule, TAKE 1 CAPSULE BY MOUTH TWICE A DAY, Disp: 180 capsule, Rfl: 1  •  sertraline (ZOLOFT) 100 mg tablet, TAKE 1 TABLET BY MOUTH EVERY DAY, Disp: 90 tablet, Rfl: 0  •  simvastatin (ZOCOR) 40 mg tablet, TAKE 1 TABLET BY MOUTH DAILY AT BEDTIME, Disp: 90 tablet, Rfl: 1  •  acetaminophen (TYLENOL) 500 mg tablet, Take 500 mg by mouth every 6 (six) hours as needed, Disp: , Rfl:   •  furosemide (LASIX) 20 mg tablet, Take 1 tablet (20 mg total) by mouth daily as needed (wt gain) In the morning, Disp: 90 tablet, Rfl: 3  •  ofloxacin (FLOXIN) 0 3 % otic solution, Administer 5 drops into the left ear 2 (two) times a day (Patient not taking: Reported on 3/22/2023), Disp: 10 mL, Rfl: 1    Allergies   Allergen Reactions   • Latex Rash   • Neosporin [Neomycin-Bacitracin Zn-Polymyx] Rash and Other (See Comments)     hives per [facility] order          Lab Review   Appointment on 03/17/2023   Component Date Value   • WBC 03/17/2023 7 33    • RBC 03/17/2023 4 71    • Hemoglobin 03/17/2023 12 7    • Hematocrit 03/17/2023 41 6    • MCV 03/17/2023 88    • MCH 03/17/2023 27 0    • MCHC 03/17/2023 30 5 (L)    • RDW 03/17/2023 15 1    • MPV 03/17/2023 10 2    • Platelets 52/42/8560 387    • nRBC 03/17/2023 0    • Neutrophils Relative 03/17/2023 54    • Immat GRANS % 03/17/2023 0    • Lymphocytes Relative 03/17/2023 29    • Monocytes Relative 03/17/2023 8    • Eosinophils Relative 03/17/2023 8 (H)    • Basophils Relative 03/17/2023 1    • Neutrophils Absolute 03/17/2023 3 93    • Immature Grans Absolute 03/17/2023 0 02    • Lymphocytes Absolute 03/17/2023 2 15    • Monocytes Absolute 03/17/2023 0 57    • Eosinophils Absolute 03/17/2023 0 59    • Basophils Absolute 03/17/2023 0 07    • Sodium 03/17/2023 140    • Potassium 03/17/2023 4 6    • Chloride 03/17/2023 106    • CO2 03/17/2023 26    • ANION GAP 03/17/2023 8    • BUN 03/17/2023 27 (H)    • Creatinine 03/17/2023 0 80    • Glucose, Fasting 03/17/2023 105 (H)    • Calcium 03/17/2023 10 5 (H)    • AST 03/17/2023 23    • ALT 03/17/2023 21    • Alkaline Phosphatase 03/17/2023 74    • Total Protein 03/17/2023 7 3    • Albumin 03/17/2023 4 2    • Total Bilirubin 03/17/2023 0 39    • eGFR 03/17/2023 68    • Cholesterol 03/17/2023 142    • Triglycerides 03/17/2023 89    • HDL, Direct 03/17/2023 66    • LDL Calculated 03/17/2023 58    • Non-HDL-Chol (CHOL-HDL) 03/17/2023 76    • Hemoglobin A1C 03/17/2023 5 9 (H)    • EAG 03/17/2023 123    • Color, UA 03/17/2023 Yellow    • Clarity, UA 03/17/2023 Clear    • Specific Gravity, UA 03/17/2023 1 017    • pH, UA 03/17/2023 5 5    • Leukocytes, UA 03/17/2023 Moderate (A)    • Nitrite, UA 03/17/2023 Negative    • Protein, UA 03/17/2023 Negative    • Glucose, UA 03/17/2023 Negative    • Ketones, UA 03/17/2023 Negative    • Urobilinogen, UA 03/17/2023 <2 0    • Bilirubin, UA 03/17/2023 Negative    • Occult Blood, UA 03/17/2023 Negative    • RBC, UA 03/17/2023 1-2    • WBC, UA 03/17/2023 Innumerable (A)    • Epithelial Cells 03/17/2023 Occasional    • Bacteria, UA 03/17/2023 Occasional    • Urine Culture 03/17/2023 50,000-59,000 cfu/ml Klebsiella variicola (A)    • Urine Culture 03/17/2023 <10,000 cfu/ml         Imaging: No results found      Objective:     Physical Exam  Constitutional:       Appearance: She is well-developed  HENT:      Right Ear: Tympanic membrane normal       Left Ear: Tympanic membrane normal       Nose: No congestion  Cardiovascular:      Rate and Rhythm: Normal rate and regular rhythm  Heart sounds: Normal heart sounds  No murmur heard  Pulmonary:      Effort: Pulmonary effort is normal  No respiratory distress  Breath sounds: Normal breath sounds  Skin:     General: Skin is warm and dry  Neurological:      Mental Status: She is alert and oriented to person, place, and time  There are no Patient Instructions on file for this visit  MABEL Hallman    Portions of the record may have been created with voice recognition software  Occasional wrong word or "sound a like" substitutions may have occurred due to the inherent limitations of voice recognition software  Read the chart carefully and recognize, using context, where substitutions have occurred

## 2023-03-28 NOTE — Clinical Note
Need the last ct temporal bones done by Dr Jose Ramon Chopra,
[FreeTextEntry1] : Please refer to [facility] Consult note

## 2023-04-04 ENCOUNTER — TELEPHONE (OUTPATIENT)
Dept: PAIN MEDICINE | Facility: CLINIC | Age: 83
End: 2023-04-04

## 2023-04-04 NOTE — TELEPHONE ENCOUNTER
Caller: Arlet Will PT    Doctor: [id number] [facility]    Reason for call: Pt called in to schedule a procedure       Call back#: [phone]

## 2023-04-05 NOTE — TELEPHONE ENCOUNTER
S/w pt scheduled LESI for 5/9/23, patient is currently on Aspirin RX by PCP, please contact for HOLD  Reviewed all procedure instructions, including vaccination/ policy

## 2023-04-05 NOTE — TELEPHONE ENCOUNTER
Caller: Lazarus Londono     Doctor: Dr Goldsmith Human     Reason for call: Patient returning call     Call back#: [phone]

## 2023-04-27 DIAGNOSIS — F41.8 DEPRESSION WITH ANXIETY: ICD-10-CM

## 2023-04-27 RX ORDER — SERTRALINE HYDROCHLORIDE 100 MG/1
TABLET, FILM COATED ORAL
Qty: 90 TABLET | Refills: 0 | Status: SHIPPED | OUTPATIENT
Start: 2023-04-27

## 2023-05-03 DIAGNOSIS — I10 ESSENTIAL HYPERTENSION: ICD-10-CM

## 2023-05-03 DIAGNOSIS — K21.00 GASTROESOPHAGEAL REFLUX DISEASE WITH ESOPHAGITIS: ICD-10-CM

## 2023-05-03 DIAGNOSIS — E03.9 ACQUIRED HYPOTHYROIDISM: ICD-10-CM

## 2023-05-03 RX ORDER — LEVOTHYROXINE SODIUM 0.05 MG/1
TABLET ORAL
Qty: 90 TABLET | Refills: 1 | Status: SHIPPED | OUTPATIENT
Start: 2023-05-03

## 2023-05-03 RX ORDER — OMEPRAZOLE 40 MG/1
CAPSULE, DELAYED RELEASE ORAL
Qty: 180 CAPSULE | Refills: 1 | Status: SHIPPED | OUTPATIENT
Start: 2023-05-03

## 2023-05-03 RX ORDER — OLMESARTAN MEDOXOMIL 5 MG/1
TABLET ORAL
Qty: 180 TABLET | Refills: 3 | Status: SHIPPED | OUTPATIENT
Start: 2023-05-03

## 2023-05-09 ENCOUNTER — HOSPITAL ENCOUNTER (OUTPATIENT)
Dept: RADIOLOGY | Facility: CLINIC | Age: 83
Discharge: HOME | End: 2023-05-09

## 2023-05-09 VITALS
SYSTOLIC BLOOD PRESSURE: 109 MMHG | HEART RATE: 55 BPM | DIASTOLIC BLOOD PRESSURE: 67 MMHG | TEMPERATURE: 97.1 F | OXYGEN SATURATION: 95 % | RESPIRATION RATE: 20 BRPM

## 2023-05-09 DIAGNOSIS — M48.062 SPINAL STENOSIS OF LUMBAR REGION WITH NEUROGENIC CLAUDICATION: ICD-10-CM

## 2023-05-09 RX ORDER — METHYLPREDNISOLONE ACETATE 80 MG/ML
80 INJECTION, SUSPENSION INTRA-ARTICULAR; INTRALESIONAL; INTRAMUSCULAR; PARENTERAL; SOFT TISSUE ONCE
Status: COMPLETED | OUTPATIENT
Start: 2023-05-09 | End: 2023-05-09

## 2023-05-09 RX ADMIN — IOHEXOL 1 ML: 300 INJECTION, SOLUTION INTRAVENOUS at 09:52

## 2023-05-09 RX ADMIN — METHYLPREDNISOLONE ACETATE 80 MG: 80 INJECTION, SUSPENSION INTRA-ARTICULAR; INTRALESIONAL; INTRAMUSCULAR; PARENTERAL; SOFT TISSUE at 09:55

## 2023-05-09 NOTE — DISCHARGE INSTR - LAB
Epidural Steroid Injection   WHAT YOU NEED TO KNOW:   An epidural steroid injection (SYMONE) is a procedure to inject steroid medicine into the epidural space  The epidural space is between your spinal cord and vertebrae  Steroids reduce inflammation and fluid buildup in your spine that may be causing pain  You may be given pain medicine along with the steroids  ACTIVITY  Do not drive or operate machinery today  No strenuous activity today - bending, lifting, etc   You may resume normal activites starting tomorrow - start slowly and as tolerated  You may shower today, but no tub baths or hot tubs  You may have numbness for several hours from the local anesthetic  Please use caution and common sense, especially with weight-bearing activities  CARE OF THE INJECTION SITE  If you have soreness or pain, apply ice to the area today (20 minutes on/20 minutes off)  Starting tomorrow, you may use warm, moist heat or ice if needed  You may have an increase or change in your discomfort for 36-48 hours after your treatment  Apply ice and continue with any pain medication you have been prescribed  Notify the [facility] if you have any of the following: redness, drainage, swelling, headache, stiff neck or fever above 100°F     SPECIAL INSTRUCTIONS  Our office will contact you in approximately 7 days for a progress report  MEDICATIONS  Continue to take all routine medications  Our office may have instructed you to hold some medications  As no general anesthesia was used in today's procedure, you should not experience any side effects related to anesthesia  If you are diabetic, the steroids used in today's injection may temporarily increase your blood sugar levels after the first few days after your injection  Please keep a close eye on your sugars and alert the doctor who manages your diabetes if your sugars are significantly high from your baseline or you are symptomatic       If you have a problem specifically related to your procedure, please call our office at [phone]  Problems not related to your procedure should be directed to your primary care physician

## 2023-05-16 ENCOUNTER — TELEPHONE (OUTPATIENT)
Dept: PAIN MEDICINE | Facility: CLINIC | Age: 83
End: 2023-05-16

## 2023-05-17 NOTE — TELEPHONE ENCOUNTER
.Caller: pt    Doctor:     Reason for call: Pt is calling in stating that she is slightly better. She is asking if she can have another injection? Pain level is a 9/10    Call back#: [phone]

## 2023-05-18 NOTE — TELEPHONE ENCOUNTER
S/W pt who states LESI on 5/9 helped minimally, about 8%  Pain is still there in the same place at 9/10  Can pt repeat LESI?  Please place order

## 2023-05-24 NOTE — TELEPHONE ENCOUNTER
Caller: Marisol WIGGINS    Doctor: Dr Montero     Reason for call: Schedule another inj     Call back#: [phone]

## 2023-05-25 NOTE — TELEPHONE ENCOUNTER
S/w pt and scheduled LESI for 6/29/23 1130 am arrival. Gave pre procedure instructions and  policy.

## 2023-05-29 NOTE — ED NOTES
While ambulating patient, patient oxygen dropped to 81 and became tachypnic     Rolan Ascencio RN  08/13/18 3135 needs pmd for establishing care and also urology for known renal colic

## 2023-06-23 ENCOUNTER — TELEPHONE (OUTPATIENT)
Dept: FAMILY MEDICINE CLINIC | Facility: CLINIC | Age: 83
End: 2023-06-23

## 2023-06-23 DIAGNOSIS — H53.2 DOUBLE VISION: Primary | ICD-10-CM

## 2023-06-23 DIAGNOSIS — E78.5 HYPERLIPIDEMIA, UNSPECIFIED HYPERLIPIDEMIA TYPE: ICD-10-CM

## 2023-06-23 RX ORDER — SIMVASTATIN 40 MG
TABLET ORAL
Qty: 90 TABLET | Refills: 1 | Status: SHIPPED | OUTPATIENT
Start: 2023-06-23

## 2023-06-23 NOTE — TELEPHONE ENCOUNTER
----- Message from MABEL Edgar sent at 6/23/2023  2:43 PM EDT -----  Please let Kirk Tidwell know that I did talk to her ophthalmologist who was concerned about her double vision  I did order an MRI of the brain with and without contrast to assess why she is having the double vision

## 2023-06-26 ENCOUNTER — APPOINTMENT (EMERGENCY)
Dept: CT IMAGING | Facility: HOSPITAL | Age: 83
End: 2023-06-26
Payer: MEDICARE

## 2023-06-26 ENCOUNTER — HOSPITAL ENCOUNTER (INPATIENT)
Facility: HOSPITAL | Age: 83
LOS: 2 days | Discharge: HOME | End: 2023-06-28
Attending: INTERNAL MEDICINE | Admitting: INTERNAL MEDICINE
Payer: MEDICARE

## 2023-06-26 ENCOUNTER — HOSPITAL ENCOUNTER (EMERGENCY)
Facility: HOSPITAL | Age: 83
End: 2023-06-26
Attending: EMERGENCY MEDICINE
Payer: MEDICARE

## 2023-06-26 VITALS
DIASTOLIC BLOOD PRESSURE: 67 MMHG | RESPIRATION RATE: 21 BRPM | OXYGEN SATURATION: 95 % | SYSTOLIC BLOOD PRESSURE: 154 MMHG | TEMPERATURE: 98 F | HEART RATE: 57 BPM

## 2023-06-26 DIAGNOSIS — H05.89 ORBITAL MASS: Primary | ICD-10-CM

## 2023-06-26 DIAGNOSIS — H53.2 DIPLOPIA: Primary | ICD-10-CM

## 2023-06-26 PROBLEM — E03.9 HYPOTHYROID: Status: ACTIVE | Noted: 2017-10-29

## 2023-06-26 LAB
2HR DELTA HS TROPONIN: -3 NG/L
ALBUMIN SERPL BCP-MCNC: 3.9 G/DL (ref 3.5–5)
ALP SERPL-CCNC: 61 U/L (ref 34–104)
ALT SERPL W P-5'-P-CCNC: 23 U/L (ref 7–52)
ANION GAP SERPL CALCULATED.3IONS-SCNC: 6 MMOL/L
APTT PPP: 30 SECONDS (ref 23–37)
AST SERPL W P-5'-P-CCNC: 27 U/L (ref 13–39)
ATRIAL RATE: 53 BPM
ATRIAL RATE: 60 BPM
BASOPHILS # BLD AUTO: 0.05 THOUSANDS/ÂΜL (ref 0–0.1)
BASOPHILS NFR BLD AUTO: 1 % (ref 0–1)
BILIRUB SERPL-MCNC: 0.46 MG/DL (ref 0.2–1)
BUN SERPL-MCNC: 45 MG/DL (ref 5–25)
CALCIUM SERPL-MCNC: 10.3 MG/DL (ref 8.4–10.2)
CARDIAC TROPONIN I PNL SERPL HS: 11 NG/L
CARDIAC TROPONIN I PNL SERPL HS: 8 NG/L
CHLORIDE SERPL-SCNC: 106 MMOL/L (ref 96–108)
CO2 SERPL-SCNC: 26 MMOL/L (ref 21–32)
CREAT SERPL-MCNC: 1.14 MG/DL (ref 0.6–1.3)
EOSINOPHIL # BLD AUTO: 0.54 THOUSAND/ÂΜL (ref 0–0.61)
EOSINOPHIL NFR BLD AUTO: 7 % (ref 0–6)
ERYTHROCYTE [DISTWIDTH] IN BLOOD BY AUTOMATED COUNT: 16.3 % (ref 11.6–15.1)
ERYTHROCYTE [DISTWIDTH] IN BLOOD BY AUTOMATED COUNT: 16.4 % (ref 11.6–15.1)
GFR SERPL CREATININE-BSD FRML MDRD: 44 ML/MIN/1.73SQ M
GLUCOSE SERPL-MCNC: 92 MG/DL (ref 65–140)
HCT VFR BLD AUTO: 36.8 % (ref 34.8–46.1)
HCT VFR BLD AUTO: 39.6 % (ref 34.8–46.1)
HGB BLD-MCNC: 11.5 G/DL (ref 11.5–15.4)
HGB BLD-MCNC: 12.1 G/DL (ref 11.5–15.4)
IMM GRANULOCYTES # BLD AUTO: 0.04 THOUSAND/UL (ref 0–0.2)
IMM GRANULOCYTES NFR BLD AUTO: 1 % (ref 0–2)
INR PPP: 1.02 (ref 0.84–1.19)
INR PPP: 1.05 (ref 0.84–1.19)
LYMPHOCYTES # BLD AUTO: 2.17 THOUSANDS/ÂΜL (ref 0.6–4.47)
LYMPHOCYTES NFR BLD AUTO: 27 % (ref 14–44)
MCH RBC QN AUTO: 28 PG (ref 26.8–34.3)
MCH RBC QN AUTO: 28.3 PG (ref 26.8–34.3)
MCHC RBC AUTO-ENTMCNC: 30.6 G/DL (ref 31.4–37.4)
MCHC RBC AUTO-ENTMCNC: 31.3 G/DL (ref 31.4–37.4)
MCV RBC AUTO: 90 FL (ref 82–98)
MCV RBC AUTO: 92 FL (ref 82–98)
MONOCYTES # BLD AUTO: 0.8 THOUSAND/ÂΜL (ref 0.17–1.22)
MONOCYTES NFR BLD AUTO: 10 % (ref 4–12)
NEUTROPHILS # BLD AUTO: 4.6 THOUSANDS/ÂΜL (ref 1.85–7.62)
NEUTS SEG NFR BLD AUTO: 54 % (ref 43–75)
NRBC BLD AUTO-RTO: 0 /100 WBCS
P AXIS: 50 DEGREES
P AXIS: 52 DEGREES
PLATELET # BLD AUTO: 278 THOUSANDS/UL (ref 149–390)
PLATELET # BLD AUTO: 288 THOUSANDS/UL (ref 149–390)
PMV BLD AUTO: 10.2 FL (ref 8.9–12.7)
PMV BLD AUTO: 9.8 FL (ref 8.9–12.7)
POTASSIUM SERPL-SCNC: 4.8 MMOL/L (ref 3.5–5.3)
PR INTERVAL: 232 MS
PR INTERVAL: 234 MS
PROT SERPL-MCNC: 7 G/DL (ref 6.4–8.4)
PROTHROMBIN TIME: 13.5 SECONDS (ref 11.6–14.5)
PROTHROMBIN TIME: 13.6 SECONDS (ref 11.6–14.5)
QRS AXIS: -27 DEGREES
QRS AXIS: -37 DEGREES
QRSD INTERVAL: 80 MS
QRSD INTERVAL: 82 MS
QT INTERVAL: 438 MS
QT INTERVAL: 442 MS
QTC INTERVAL: 414 MS
QTC INTERVAL: 438 MS
RBC # BLD AUTO: 4.07 MILLION/UL (ref 3.81–5.12)
RBC # BLD AUTO: 4.32 MILLION/UL (ref 3.81–5.12)
SODIUM SERPL-SCNC: 138 MMOL/L (ref 135–147)
T WAVE AXIS: 45 DEGREES
T WAVE AXIS: 52 DEGREES
VENTRICULAR RATE: 53 BPM
VENTRICULAR RATE: 60 BPM
WBC # BLD AUTO: 11.56 THOUSAND/UL (ref 4.31–10.16)
WBC # BLD AUTO: 8.2 THOUSAND/UL (ref 4.31–10.16)

## 2023-06-26 PROCEDURE — 93005 ELECTROCARDIOGRAM TRACING: CPT

## 2023-06-26 PROCEDURE — 99223 1ST HOSP IP/OBS HIGH 75: CPT | Performed by: INTERNAL MEDICINE

## 2023-06-26 PROCEDURE — 85610 PROTHROMBIN TIME: CPT | Performed by: EMERGENCY MEDICINE

## 2023-06-26 PROCEDURE — 84484 ASSAY OF TROPONIN QUANT: CPT | Performed by: EMERGENCY MEDICINE

## 2023-06-26 PROCEDURE — 36415 COLL VENOUS BLD VENIPUNCTURE: CPT | Performed by: EMERGENCY MEDICINE

## 2023-06-26 PROCEDURE — 85025 COMPLETE CBC W/AUTO DIFF WBC: CPT | Performed by: EMERGENCY MEDICINE

## 2023-06-26 PROCEDURE — 93010 ELECTROCARDIOGRAM REPORT: CPT | Performed by: INTERNAL MEDICINE

## 2023-06-26 PROCEDURE — 85730 THROMBOPLASTIN TIME PARTIAL: CPT | Performed by: EMERGENCY MEDICINE

## 2023-06-26 PROCEDURE — 70496 CT ANGIOGRAPHY HEAD: CPT

## 2023-06-26 PROCEDURE — 70498 CT ANGIOGRAPHY NECK: CPT

## 2023-06-26 PROCEDURE — 80053 COMPREHEN METABOLIC PANEL: CPT | Performed by: EMERGENCY MEDICINE

## 2023-06-26 PROCEDURE — 85610 PROTHROMBIN TIME: CPT | Performed by: INTERNAL MEDICINE

## 2023-06-26 PROCEDURE — 85027 COMPLETE CBC AUTOMATED: CPT | Performed by: INTERNAL MEDICINE

## 2023-06-26 RX ORDER — OXYBUTYNIN CHLORIDE 5 MG/1
10 TABLET, EXTENDED RELEASE ORAL DAILY
Status: DISCONTINUED | OUTPATIENT
Start: 2023-06-27 | End: 2023-06-28 | Stop reason: HOSPADM

## 2023-06-26 RX ORDER — ACETAMINOPHEN 325 MG/1
975 TABLET ORAL EVERY 8 HOURS PRN
Status: DISCONTINUED | OUTPATIENT
Start: 2023-06-26 | End: 2023-06-27

## 2023-06-26 RX ORDER — LOSARTAN POTASSIUM 25 MG/1
25 TABLET ORAL DAILY
Status: DISCONTINUED | OUTPATIENT
Start: 2023-06-27 | End: 2023-06-27

## 2023-06-26 RX ORDER — MAGNESIUM HYDROXIDE/ALUMINUM HYDROXICE/SIMETHICONE 120; 1200; 1200 MG/30ML; MG/30ML; MG/30ML
30 SUSPENSION ORAL ONCE
Status: COMPLETED | OUTPATIENT
Start: 2023-06-26 | End: 2023-06-26

## 2023-06-26 RX ORDER — SERTRALINE HYDROCHLORIDE 100 MG/1
100 TABLET, FILM COATED ORAL DAILY
Status: DISCONTINUED | OUTPATIENT
Start: 2023-06-27 | End: 2023-06-28 | Stop reason: HOSPADM

## 2023-06-26 RX ORDER — ENOXAPARIN SODIUM 100 MG/ML
40 INJECTION SUBCUTANEOUS EVERY 12 HOURS
Status: DISCONTINUED | OUTPATIENT
Start: 2023-06-26 | End: 2023-06-26

## 2023-06-26 RX ORDER — PANTOPRAZOLE SODIUM 40 MG/1
40 TABLET, DELAYED RELEASE ORAL
Status: DISCONTINUED | OUTPATIENT
Start: 2023-06-27 | End: 2023-06-28 | Stop reason: HOSPADM

## 2023-06-26 RX ORDER — ENOXAPARIN SODIUM 100 MG/ML
40 INJECTION SUBCUTANEOUS
Status: DISCONTINUED | OUTPATIENT
Start: 2023-06-27 | End: 2023-06-26

## 2023-06-26 RX ORDER — LEVOTHYROXINE SODIUM 0.05 MG/1
50 TABLET ORAL
Status: DISCONTINUED | OUTPATIENT
Start: 2023-06-27 | End: 2023-06-28 | Stop reason: HOSPADM

## 2023-06-26 RX ORDER — FAMOTIDINE 10 MG/ML
20 INJECTION, SOLUTION INTRAVENOUS ONCE
Status: COMPLETED | OUTPATIENT
Start: 2023-06-26 | End: 2023-06-26

## 2023-06-26 RX ORDER — PRAVASTATIN SODIUM 80 MG/1
80 TABLET ORAL
Status: DISCONTINUED | OUTPATIENT
Start: 2023-06-27 | End: 2023-06-28 | Stop reason: HOSPADM

## 2023-06-26 RX ORDER — ENOXAPARIN SODIUM 100 MG/ML
40 INJECTION SUBCUTANEOUS EVERY 12 HOURS
Status: DISCONTINUED | OUTPATIENT
Start: 2023-06-26 | End: 2023-06-28 | Stop reason: HOSPADM

## 2023-06-26 RX ADMIN — IOHEXOL 100 ML: 350 INJECTION, SOLUTION INTRAVENOUS at 14:27

## 2023-06-26 RX ADMIN — ALUMINUM HYDROXIDE, MAGNESIUM HYDROXIDE, AND DIMETHICONE 30 ML: 200; 20; 200 SUSPENSION ORAL at 15:29

## 2023-06-26 RX ADMIN — ENOXAPARIN SODIUM 40 MG: 40 INJECTION SUBCUTANEOUS at 22:34

## 2023-06-26 RX ADMIN — ALUMINUM HYDROXIDE, MAGNESIUM HYDROXIDE, AND DIMETHICONE 30 ML: 200; 20; 200 SUSPENSION ORAL at 18:54

## 2023-06-26 RX ADMIN — FAMOTIDINE 20 MG: 10 INJECTION, SOLUTION INTRAVENOUS at 15:29

## 2023-06-26 RX ADMIN — ACETAMINOPHEN 975 MG: 325 TABLET, FILM COATED ORAL at 22:31

## 2023-06-26 NOTE — ED NOTES
FROM: [address] [facility]   ED 19-ED 19 ([place] CT SCAN)  TO: [address] [facility] 3rd  Floor Med Surg Unit, S , S -01  DX: -- Vision changes  EMS Tx Reason: Opthomology  Transfer Priority Level (1,2,3): 2  Referring: Safia Orozco DO  Accepting: Dr Sparkle Cruz  Transport (ALS/BLS, etc):  ALS  Reason for ALS/CCT if applicable (O2, tele, IVF, meds): Cardiac monitor  P/U Time:2015  Number for Report:       Yobany Woodson RN  06/26/23 0852

## 2023-06-26 NOTE — EMTALA/ACUTE CARE TRANSFER
[address] [facility] [address]  [address] [facility] [address]  Dept: [phone]      EMTALA TRANSFER CONSENT    NAME Lam Christine                                         1940                              MRN [id number]    I have been informed of my rights regarding examination, treatment, and transfer   by Dr Danna Raphael DO    Benefits: Specialized equipment and/or services available at the receiving facility (Include comment)________________________    Risks: Loss of IV, Increased discomfort during transfer, Possible worsening of condition or death during transfer      Consent for Transfer:  I acknowledge that my medical condition has been evaluated and explained to me by the emergency department physician or other qualified medical person and/or my attending physician, who has recommended that I be transferred to the service of  Accepting Physician: Dr Dylan Silverio at [address] Name, [address] : Aaron Horta  The above potential benefits of such transfer, the potential risks associated with such transfer, and the probable risks of not being transferred have been explained to me, and I fully understand them  The doctor has explained that, in my case, the benefits of transfer outweigh the risks  I agree to be transferred  I authorize the performance of emergency medical procedures and treatments upon me in both transit and upon arrival at the receiving facility  Additionally, I authorize the release of any and all medical records to the receiving facility and request they be transported with me, if possible  I understand that the safest mode of transportation during a medical emergency is an ambulance and that the Hospital advocates the use of this mode of transport   Risks of traveling to the receiving facility by car, including absence of medical control, life sustaining equipment, such as oxygen, and medical personnel has been explained to me and I fully understand them  (KATHLEEN CORRECT BOX BELOW)  [  ]  I consent to the stated transfer and to be transported by ambulance/helicopter  [  ]  I consent to the stated transfer, but refuse transportation by ambulance and accept full responsibility for my transportation by car  I understand the risks of non-ambulance transfers and I exonerate the Hospital and its staff from any deterioration in my condition that results from this refusal     X___________________________________________    DATE  23  TIME________  Signature of patient or legally responsible individual signing on patient behalf           RELATIONSHIP TO PATIENT_________________________          Provider Certification    NAME Augustina Camp                                         1940                              MRN [id number]    A medical screening exam was performed on the above named patient  Based on the examination:    Condition Necessitating Transfer The encounter diagnosis was Diplopia  Patient Condition: The patient has been stabilized such that within reasonable medical probability, no material deterioration of the patient condition or the condition of the unborn child(tuyet) is likely to result from the transfer    Reason for Transfer: Other (Include comment)____________________    Transfer Requirements: [address]   · Space available and qualified personnel available for treatment as acknowledged by    · Agreed to accept transfer and to provide appropriate medical treatment as acknowledged by       Dr Bhanu Gloria  · Appropriate medical records of the examination and treatment of the patient are provided at the time of transfer   [address], [address] _______  · Transfer will be performed by qualified personnel from    and appropriate transfer equipment as required, including the use of necessary and appropriate life support measures      Provider Certification: I have examined the patient and explained the following risks and benefits of being transferred/refusing transfer to the patient/family:  General risk, such as traffic hazards, adverse weather conditions, rough terrain or turbulence, possible failure of equipment (including vehicle or aircraft), or consequences of actions of persons outside the control of the transport personnel, Unanticipated needs of medical equipment and personnel during transport, Risk of worsening condition, The possibility of a transport vehicle being unavailable      Based on these reasonable risks and benefits to the patient and/or the unborn child(tuyet), and based upon the information available at the time of the patient’s examination, I certify that the medical benefits reasonably to be expected from the provision of appropriate medical treatments at another medical facility outweigh the increasing risks, if any, to the individual’s medical condition, and in the case of labor to the unborn child, from effecting the transfer      X____________________________________________ DATE 06/26/23        TIME_______      ORIGINAL - SEND TO MEDICAL RECORDS   COPY - SEND WITH PATIENT DURING TRANSFER

## 2023-06-26 NOTE — ED PROVIDER NOTES
Pt Name: Abdul Collet  MRN: [id number]  [place]uan [place] 1940  Age/Sex: 79 y o  female  Date of evaluation: 6/26/2023  PCP: Odin Ryan, [address] [place] [address]    Chief Complaint   Patient presents with   • Blurred Vision     Blurred vision since last tues, saw eye doctor and they said everything looked ok          HPI and MDM    79 y o  female presenting with double vision that started last Tuesday  She saw her ophthalmologist on Tuesday and Wednesday, states everything was fine  The symptoms resolved on Wednesday and then reoccurred on Thursday and have been there since then  She states she sees double, if she looks at her clock she sees 1 clock on the top 1 on bottom  No weakness, numbness/tingling, headache, chest pain or shortness of breath or speech issues  No history of stroke  She has an MRI scheduled in July  Differential diagnosis considered includes but not limited to CVA/TIA, brain mass, intracranial hemorrhage, ophthalmoplegia  We will obtain CTA head and neck  Pt had mid cp, burning sensation, will obtain EKG and troponin  She states it feels like indigestion  Per my independent interpretation of EKG, sinus bradycardia with heart rate of 53, narrow QRS, first-degree AV block, QTc reassuring, no STEMI  ED Course as of 06/27/23 Collin Diaz Jun 26, 2023   [id number] I discussed CTA results with ophthalmology, Dr Shawna Rangel  He recommends reaching out to [facility]  I discussed with Dr Josephine Lawrence with [facility], recommending reaching out to [facility]ovascular  [facility] Per Dr Catrachita Granados, neuroendovascular, can get MRI/MRA, but pt will ultimately likely need [facility]  Will discuss with [facility]     5826 Unable to get through to [facility]  PACs will try [facility]  26 Spoke with ophthalmologist at [facility], likely a venous malformation, will call back  5581 Per [facility], patient should be evaluated by ophtho for diplopia, then should follow up outpatient with oculoplastics  Does not need emergent transfer  [address] again with Dr Francesca Pollard, he is agreeable with transfer for exam, and then patient can also get MRI/MRA brain  Updated PACS, will talk with [facility] for admission  1922 Spoke to Dr Marie Prahter with [facility] [place], he accepted patient for transfer  0019 Patient was having bradycardic episodes, has known history of atrial ventricular first-degree AV block, an EKG was obtained again which showed sinus rhythm with heart rate of 45, narrow QRS, no STEMI, QTc reassuring  Computer reads it as Mobitz 1 AV block, however I do not think that is accurate, I discussed with cardiology, Dr Azalea Hayden, sent him rhythm strips and the EKG, he states that looks like intermittent junctional beats otherwise sinus, continue to watch on telemetry, no advanced AV block for now          Medications   iohexol (OMNIPAQUE) 350 MG/ML injection (SINGLE-DOSE) 100 mL (100 mL Intravenous Given 6/26/23 1427)   aluminum-magnesium hydroxide-simethicone (MYLANTA) oral suspension 30 mL (30 mL Oral Given 6/26/23 1529)   Famotidine (PF) (PEPCID) injection 20 mg (20 mg Intravenous Given 6/26/23 1529)   aluminum-magnesium hydroxide-simethicone (MYLANTA) oral suspension 30 mL (30 mL Oral Given 6/26/23 1854)         Past Medical and Surgical History    Past Medical History:   Diagnosis Date   • Anemia 08/22/2018   • Anxiety    • Arthritis    • AVB (atrioventricular block)     first degree   • Cataract    • CHF (congestive heart failure) (HCC)    • COPD, mild (HCC)    • Coronary artery disease    • Dislocation of right shoulder joint    • Frequent UTI    • GERD (gastroesophageal reflux disease)    • H/O: pneumonia    • Heme positive stool    • Hyperlipidemia    • Hypertension    • Hypothyroidism    • Morbid obesity with BMI of 50 0-59 9, adult ([facility] [address] )    • Obesity, morbid ([facility] [address] ) 08/22/2018   • JANICE on CPAP    • Pulmonary hypertension ([facility] [address] ) 08/22/2018   • Severe aortic stenosis    • Simple goiter    • Skin cyst     within the armpits, right   • Wears glasses        Past Surgical History:   Procedure Laterality Date   • BREAST BIOPSY     • CARDIAC CATHETERIZATION     • CARPAL TUNNEL RELEASE Bilateral    • CHOLECYSTECTOMY     • DILATION AND CURETTAGE OF UTERUS     • HYSTEROSCOPY     • MASTOID SURGERY     • MT COLONOSCOPY FLX DX W/COLLJ SPEC WHEN PFRMD N/A 9/6/2018    Procedure: COLONOSCOPY;  Surgeon: Janes Guzman MD;  Location: [facility]; Service: Gastroenterology   • MT ECHO TRANSESOPHAG R-T 2D W/PRB IMG ACQUISJ I&R N/A 10/9/2018    Procedure: INTRA-OP TRANSESOPHAGEAL ECHOCARDIOGRAM (GARRISON); Surgeon: Anthony Fortune DO;  [facility]: [facility];  Service: Cardiac Surgery   • MT ESOPHAGOGASTRODUODENOSCOPY TRANSORAL DIAGNOSTIC N/A 8/31/2018    Procedure: ESOPHAGOGASTRODUODENOSCOPY (EGD); Surgeon: Janes Guzman MD;  [facility]: [facility]; Service: Gastroenterology   • MT REPLACE AORTIC VALVE OPENFEMORAL ARTERY APPROACH N/A 10/9/2018    Procedure: REPLACEMENT AORTIC VALVE TRANSCATHETER (TAVR) TRANSFEMORAL W/ 23 MM MENDOZA NOE S3 VALVE (ACCESS OF LEFT);   Surgeon: Anthony Fortune DO;  [facility];  Service: Cardiac Surgery   • TOTAL HIP ARTHROPLASTY Left 2007   • TOTAL KNEE ARTHROPLASTY Bilateral        Family History   Problem Relation Age of Onset   • Diabetes Mother    • Stroke Mother    • Cancer Father    • Lung cancer Father    • Diabetes Sister    • Heart disease Sister    • Hypertension Sister    • Coronary artery disease Family    • Diabetes Family    • Hypertension Family    • Cancer Family    • Stroke Family    • Thyroid disease Neg Hx        Social History     Tobacco Use   • Smoking status: Never   • Smokeless tobacco: Never   Vaping Use   • Vaping Use: Never used   Substance Use Topics   • Alcohol use: No   • Drug use: No           Allergies    Allergies   Allergen Reactions   • Latex Rash   • Neosporin [Neomycin-Bacitracin Zn-Polymyx] Rash and Other (See Comments)     hives per [facility] order       Home Medications    Prior to Admission medications    Medication Sig Start Date End Date Taking?  Authorizing Provider   acetaminophen (TYLENOL) 500 mg tablet Take 500 mg by mouth every 6 (six) hours as needed    Historical Provider, MD   albuterol (2 5 mg/3 mL) 0 083 % nebulizer solution Take 3 mL (2 5 mg total) by nebulization every 6 (six) hours as needed for wheezing or shortness of breath  Patient taking differently: Take 2 5 mg by nebulization every 6 (six) hours as needed for wheezing or shortness of breath PRN 4/4/22   Roderick Zuniga PA-C   albuterol (PROVENTIL HFA,VENTOLIN HFA) 90 mcg/act inhaler Inhale 2 puffs every 6 (six) hours as needed for wheezing 6/16/20   MABEL Jernigan   Ascorbic Acid, Vitamin C, (VITAMIN C) 100 MG tablet Take 100 mg by mouth daily    Historical Provider, MD   aspirin (ECOTRIN LOW STRENGTH) 81 mg EC tablet Take 1 tablet (81 mg total) by mouth daily 10/11/18   Oxana Leonardo PA-C   b complex vitamins capsule Take 1 capsule by mouth 2 (two) times a day      Historical Provider, MD   budesonide-formoterol (Symbicort) 160-4 5 mcg/act inhaler Inhale 2 puffs 2 (two) times a day 7/29/22   Roderick Zuniga PA-C   Calcium Carb-Cholecalciferol (CALCIUM 600 + D PO) Take 1 tablet by mouth 2 (two) times a day    Historical Provider, MD   Calcium Carb-Cholecalciferol 600-10 MG-MCG TABS Take 1 tablet by mouth    Historical Provider, MD   Cranberry 1000 MG CAPS Take by mouth    Historical Provider, MD   Cranberry 250 MG TABS Take by mouth    Historical Provider, MD   Diclofenac Sodium (VOLTAREN) 1 % Apply 2 g topically 4 (four) times a day 8/5/22   Ortiz Grady MD   Fesoterodine Fumarate ER (Toviaz) 8 MG TB24 Take 8 mg by mouth daily     Historical Provider, MD   furosemide (LASIX) 20 mg tablet Take 1 tablet (20 mg total) by mouth daily as needed (wt gain) In the morning 1/16/23 2/15/23  Kristin Syed MD   ibuprofen (MOTRIN) 200 mg tablet Take 200 mg by mouth every 6 (six) hours as needed    Historical Provider, MD levothyroxine 50 mcg tablet TAKE 1 TABLET BY MOUTH EVERY DAY 5/3/23   MABEL Hallman   olmesartan (BENICAR) 5 mg tablet TAKE 2 TABLETS BY MOUTH EVERY DAY 5/3/23   Rin Rollins MD   omeprazole (PriLOSEC) 40 MG capsule TAKE 1 CAPSULE BY MOUTH TWICE A DAY 5/3/23   MABEL Hallman   sertraline (ZOLOFT) 100 mg tablet TAKE 1 TABLET BY MOUTH EVERY DAY 4/27/23   MABEL Hallman   simvastatin (ZOCOR) 40 mg tablet TAKE 1 TABLET BY MOUTH EVERYDAY AT BEDTIME 6/23/23   MABEL Jones   cyclobenzaprine (FLEXERIL) 5 mg tablet Take 1 tablet (5 mg total) by mouth 2 (two) times a day as needed for muscle spasms 8/30/22 9/5/22  MABEL Hallman   meloxicam (Mobic) 15 mg tablet Take 1 tablet (15 mg total) by mouth daily 8/30/22 9/5/22  MABEL Galo           Physical Exam      ED Triage Vitals   Temperature Pulse Respirations Blood Pressure SpO2   06/26/23 1306 06/26/23 1306 06/26/23 1306 06/26/23 1307 06/26/23 1306   98 °F (36 7 °C) 63 18 (!) 166/125 96 %      Temp src Heart Rate Source Patient Position - Orthostatic VS BP Location FiO2 (%)   -- 06/26/23 1400 06/26/23 1400 06/26/23 1400 --    Monitor Lying Right arm       Pain Score       --                      Physical Exam  Constitutional:       General: She is not in acute distress  Appearance: She is not ill-appearing  HENT:      Head: Normocephalic and atraumatic  Nose: Nose normal       Mouth/Throat:      Mouth: Mucous membranes are moist    Eyes:      Conjunctiva/sclera: Conjunctivae normal       Pupils: Pupils are equal, round, and reactive to light  Cardiovascular:      Rate and Rhythm: Normal rate and regular rhythm  Pulmonary:      Effort: Pulmonary effort is normal  No respiratory distress  Abdominal:      General: There is no distension  Musculoskeletal:         General: No swelling or deformity  Cervical back: Normal range of motion and neck supple  Skin:     General: Skin is warm  Findings: No erythema  Neurological:      Mental Status: She is alert and oriented to person, place, and time  Mental status is at baseline  Sensory: No sensory deficit  Motor: No weakness                Diagnostic Results      Labs:    Results Reviewed     Procedure Component Value Units Date/Time    HS Troponin I 2hr [[id number]]  (Normal) Collected: 06/26/23 1745    Lab Status: Final result Specimen: Blood from Arm, Left Updated: 06/26/23 1826     hs TnI 2hr 8 ng/L      Delta 2hr hsTnI -3 ng/L     HS Troponin 0hr (reflex protocol) [[id number]]  (Normal) Collected: 06/26/23 1348    Lab Status: Final result Specimen: Blood from Arm, Left Updated: 06/26/23 1419     hs TnI 0hr 11 ng/L     Comprehensive metabolic panel [[id number]]  (Abnormal) Collected: 06/26/23 1338    Lab Status: Final result Specimen: Blood from Arm, Left Updated: 06/26/23 1405     Sodium 138 mmol/L      Potassium 4 8 mmol/L      Chloride 106 mmol/L      CO2 26 mmol/L      ANION GAP 6 mmol/L      BUN 45 mg/dL      Creatinine 1 14 mg/dL      Glucose 92 mg/dL      Calcium 10 3 mg/dL      AST 27 U/L      ALT 23 U/L      Alkaline Phosphatase 61 U/L      Total Protein 7 0 g/dL      Albumin 3 9 g/dL      Total Bilirubin 0 46 mg/dL      eGFR 44 ml/min/1 73sq m     Narrative:      Viviana guidelines for Chronic Kidney Disease (CKD):   •  Stage 1 with normal or high GFR (GFR > 90 mL/min/1 73 square meters)  •  Stage 2 Mild CKD (GFR = 60-89 mL/min/1 73 square meters)  •  Stage 3A Moderate CKD (GFR = 45-59 mL/min/1 73 square meters)  •  Stage 3B Moderate CKD (GFR = 30-44 mL/min/1 73 square meters)  •  Stage 4 Severe CKD (GFR = 15-29 mL/min/1 73 square meters)  •  Stage 5 End Stage CKD (GFR <15 mL/min/1 73 square meters)  Note: GFR calculation is accurate only with a steady state creatinine    Protime-INR [[id number]]  (Normal) Collected: 06/26/23 1338    Lab Status: Final result Specimen: Blood from Arm, Left Updated: 06/26/23 1358     Protime "13 5 seconds      INR 1 05    APTT [[id number]]  (Normal) Collected: 06/26/23 1338    Lab Status: Final result Specimen: Blood from Arm, Left Updated: 06/26/23 1358     PTT 30 seconds     CBC and differential [[id number]]  (Abnormal) Collected: 06/26/23 1338    Lab Status: Final result Specimen: Blood from Arm, Left Updated: 06/26/23 1345     WBC 8 20 Thousand/uL      RBC 4 07 Million/uL      Hemoglobin 11 5 g/dL      Hematocrit 36 8 %      MCV 90 fL      MCH 28 3 pg      MCHC 31 3 g/dL      RDW 16 3 %      MPV 9 8 fL      Platelets 285 Thousands/uL      nRBC 0 /100 WBCs      Neutrophils Relative 54 %      Immat GRANS % 1 %      Lymphocytes Relative 27 %      Monocytes Relative 10 %      Eosinophils Relative 7 %      Basophils Relative 1 %      Neutrophils Absolute 4 60 Thousands/µL      Immature Grans Absolute 0 04 Thousand/uL      Lymphocytes Absolute 2 17 Thousands/µL      Monocytes Absolute 0 80 Thousand/µL      Eosinophils Absolute 0 54 Thousand/µL      Basophils Absolute 0 05 Thousands/µL           All labs reviewed and utilized in the medical decision making process    Radiology:    CTA head and neck with and without contrast   Final Result      CT brain: 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  There is a mass 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 apex anteriorly into the retrobulbar soft tissues measuring 2 8 cm in long axis and 1 3 cm in short axis with peripheral increased density on noncontrast imaging, suggestive of an orbital venous varix, possibly    partially thrombosed  See above discussion of additional differential considerations  This is new compared to CT scan from 2021 and likely accounts for patient's diplopia  Surgical follow-up recommended  CT angiography: Unremarkable cervical and intracranial vasculature  Other findings:   Incidental thyroid nodule(s) for which nonemergent thyroid ultrasound is recommended        This examination was marked \"immediate notification\" " in Epic in order to begin the standard process by which the radiology reading room liaison alerts the referring practitioner  Workstation performed: [id number]             All radiology studies independently viewed by me and interpreted by the radiologist     Procedure    Procedures        FINAL IMPRESSION    Final diagnoses:   Diplopia         DISPOSITION    Time reflects when diagnosis was documented in both MDM as applicable and the Disposition within this note     Time User Action Codes Description Comment    6/26/2023  7:09 PM Yolie Oli Add [H53 2] Diplopia       ED Disposition     ED Disposition   Transfer to Another Facility-In Network    Condition   --    Date/Time   Mon Jun 26, 2023  7:08 PM    Comment   Enzo Frank should be transferred out to [facility]             MD Documentation    [address] [facility] Most Recent Value   Patient Condition The patient has been stabilized such that within reasonable medical probability, no material deterioration of the patient condition or the condition of the unborn child(tuyet) is likely to result from the transfer   Reason for Transfer Other (Include comment)____________________   Benefits of Transfer Specialized equipment and/or services available at the receiving facility (Include comment)________________________   Risks of Transfer Loss of IV, Increased discomfort during transfer, Possible worsening of condition or death during transfer   Accepting Physician Paola Rosales MD, Dr   Provider Certification General risk, such as traffic hazards, adverse weather conditions, rough terrain or turbulence, possible failure of equipment (including vehicle or aircraft), or consequences of actions of persons outside the control of the transport personnel, Unanticipated needs of medical equipment and personnel during transport, Risk of worsening condition, The possibility of a transport vehicle being unavailable      RN Documentation    [address] Alex Name, Jazmyne Serrano 61   Bed Assignment S    Transport Mode Ambulance   Level of Care Advanced life support   Patient Belongings Disposition Sent with patient   Transfer Date 06/26/23   Transfer Time 2045      Follow-up Information    None           PATIENT REFERRED TO:    No follow-up provider specified      DISCHARGE MEDICATIONS:    Discharge Medication List as of 6/26/2023  9:15 PM      CONTINUE these medications which have NOT CHANGED    Details   acetaminophen (TYLENOL) 500 mg tablet Take 500 mg by mouth every 6 (six) hours as needed, Historical Med      albuterol (2 5 mg/3 mL) 0 083 % nebulizer solution Take 3 mL (2 5 mg total) by nebulization every 6 (six) hours as needed for wheezing or shortness of breath, Starting Mon 4/4/2022, Normal      albuterol (PROVENTIL HFA,VENTOLIN HFA) 90 mcg/act inhaler Inhale 2 puffs every 6 (six) hours as needed for wheezing, Starting Tue 6/16/2020, Normal      Ascorbic Acid, Vitamin C, (VITAMIN C) 100 MG tablet Take 100 mg by mouth daily, Historical Med      aspirin (ECOTRIN LOW STRENGTH) 81 mg EC tablet Take 1 tablet (81 mg total) by mouth daily, Starting Thu 10/11/2018, Print      b complex vitamins capsule Take 1 capsule by mouth 2 (two) times a day  , Historical Med      budesonide-formoterol (Symbicort) 160-4 5 mcg/act inhaler Inhale 2 puffs 2 (two) times a day, Starting Fri 7/29/2022, Normal      Calcium Carb-Cholecalciferol (CALCIUM 600 + D PO) Take 1 tablet by mouth 2 (two) times a day, Historical Med      Calcium Carb-Cholecalciferol 600-10 MG-MCG TABS Take 1 tablet by mouth, Historical Med      Cranberry 1000 MG CAPS Take by mouth, Historical Med      Cranberry 250 MG TABS Take by mouth, Historical Med      Diclofenac Sodium (VOLTAREN) 1 % Apply 2 g topically 4 (four) times a day, Starting Fri 8/5/2022, Normal      Fesoterodine Fumarate ER (Toviaz) 8 MG TB24 Take 8 mg by mouth daily , Historical Med      furosemide (LASIX) 20 mg tablet Take 1 tablet (20 mg total) by mouth daily as needed (wt gain) In the morning, Starting Mon 1/16/2023, Until Wed 2/15/2023 at 2359, No Print      ibuprofen (MOTRIN) 200 mg tablet Take 200 mg by mouth every 6 (six) hours as needed, Historical Med      levothyroxine 50 mcg tablet TAKE 1 TABLET BY MOUTH EVERY DAY, Normal      olmesartan (BENICAR) 5 mg tablet TAKE 2 TABLETS BY MOUTH EVERY DAY, Normal      omeprazole (PriLOSEC) 40 MG capsule TAKE 1 CAPSULE BY MOUTH TWICE A DAY, Normal      sertraline (ZOLOFT) 100 mg tablet TAKE 1 TABLET BY MOUTH EVERY DAY, Normal      simvastatin (ZOCOR) 40 mg tablet TAKE 1 TABLET BY MOUTH EVERYDAY AT BEDTIME, Normal             No discharge procedures on file  Olman Ibrahim DO        This note was partially completed using voice recognition technology, and was scanned for gross errors; however some errors may still exist  Please contact the author with any questions or requests for clarification        Olman Ibrahim DO  06/27/23 4115

## 2023-06-27 ENCOUNTER — APPOINTMENT (OUTPATIENT)
Dept: MRI IMAGING | Facility: HOSPITAL | Age: 83
End: 2023-06-27
Payer: MEDICARE

## 2023-06-27 ENCOUNTER — TELEPHONE (OUTPATIENT)
Dept: NEUROSURGERY | Facility: CLINIC | Age: 83
End: 2023-06-27

## 2023-06-27 LAB
ANION GAP SERPL CALCULATED.3IONS-SCNC: 4 MMOL/L
ATRIAL RATE: 65 BPM
BUN SERPL-MCNC: 33 MG/DL (ref 5–25)
CALCIUM SERPL-MCNC: 9.6 MG/DL (ref 8.4–10.2)
CHLORIDE SERPL-SCNC: 108 MMOL/L (ref 96–108)
CO2 SERPL-SCNC: 28 MMOL/L (ref 21–32)
CREAT SERPL-MCNC: 0.92 MG/DL (ref 0.6–1.3)
GFR SERPL CREATININE-BSD FRML MDRD: 57 ML/MIN/1.73SQ M
GLUCOSE SERPL-MCNC: 87 MG/DL (ref 65–140)
P AXIS: 55 DEGREES
POTASSIUM SERPL-SCNC: 5.3 MMOL/L (ref 3.5–5.3)
QRS AXIS: -33 DEGREES
QRSD INTERVAL: 80 MS
QT INTERVAL: 464 MS
QTC INTERVAL: 401 MS
SODIUM SERPL-SCNC: 140 MMOL/L (ref 135–147)
T WAVE AXIS: 48 DEGREES
VENTRICULAR RATE: 45 BPM

## 2023-06-27 PROCEDURE — 99239 HOSP IP/OBS DSCHRG MGMT >30: CPT | Performed by: INTERNAL MEDICINE

## 2023-06-27 PROCEDURE — 70544 MR ANGIOGRAPHY HEAD W/O DYE: CPT

## 2023-06-27 PROCEDURE — 99223 1ST HOSP IP/OBS HIGH 75: CPT | Performed by: PHYSICIAN ASSISTANT

## 2023-06-27 PROCEDURE — 70553 MRI BRAIN STEM W/O & W/DYE: CPT

## 2023-06-27 PROCEDURE — 70543 MRI ORBT/FAC/NCK W/O &W/DYE: CPT

## 2023-06-27 PROCEDURE — 93010 ELECTROCARDIOGRAM REPORT: CPT | Performed by: INTERNAL MEDICINE

## 2023-06-27 PROCEDURE — 80048 BASIC METABOLIC PNL TOTAL CA: CPT | Performed by: INTERNAL MEDICINE

## 2023-06-27 PROCEDURE — A9585 GADOBUTROL INJECTION: HCPCS | Performed by: INTERNAL MEDICINE

## 2023-06-27 RX ORDER — ACETAMINOPHEN 325 MG/1
975 TABLET ORAL EVERY 6 HOURS PRN
Status: DISCONTINUED | OUTPATIENT
Start: 2023-06-27 | End: 2023-06-28 | Stop reason: HOSPADM

## 2023-06-27 RX ADMIN — ACETAMINOPHEN 975 MG: 325 TABLET ORAL at 19:18

## 2023-06-27 RX ADMIN — SERTRALINE 100 MG: 100 TABLET, FILM COATED ORAL at 09:08

## 2023-06-27 RX ADMIN — ACETAMINOPHEN 975 MG: 325 TABLET ORAL at 11:13

## 2023-06-27 RX ADMIN — ACETAMINOPHEN 975 MG: 325 TABLET, FILM COATED ORAL at 06:17

## 2023-06-27 RX ADMIN — OXYBUTYNIN CHLORIDE 10 MG: 5 TABLET, EXTENDED RELEASE ORAL at 09:09

## 2023-06-27 RX ADMIN — ENOXAPARIN SODIUM 40 MG: 40 INJECTION SUBCUTANEOUS at 10:44

## 2023-06-27 RX ADMIN — LEVOTHYROXINE SODIUM 50 MCG: 50 TABLET ORAL at 05:06

## 2023-06-27 RX ADMIN — GADOBUTROL 8 ML: 604.72 INJECTION INTRAVENOUS at 21:14

## 2023-06-27 RX ADMIN — ENOXAPARIN SODIUM 40 MG: 40 INJECTION SUBCUTANEOUS at 23:03

## 2023-06-27 RX ADMIN — ASPIRIN 81 MG: 81 TABLET, COATED ORAL at 09:08

## 2023-06-27 RX ADMIN — PRAVASTATIN SODIUM 80 MG: 80 TABLET ORAL at 16:33

## 2023-06-27 RX ADMIN — PANTOPRAZOLE SODIUM 40 MG: 40 TABLET, DELAYED RELEASE ORAL at 05:06

## 2023-06-27 NOTE — DISCHARGE SUMMARY
[facility]  Discharge- Mikayla Edward 1940, 79 y o  female MRN: [id number]  Unit/Bed#: S -01 Encounter: [id number]  Primary Care Provider: MABEL Beaulieu   Date and time admitted to hospital: 6/26/2023  9:23 PM    * Orbital mass  Assessment & Plan  · 1wk diplopia initially intermittent then constant as of [id number]    · Outpatient work-up was unremarkable  · Does have some mild APD on the right pupil on admission  IMAGING  CTA head and neck wit and witout contrast 84YXP22  · 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  · Mass 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 apex anteriorly into the retrobulbar soft tissues measuring 2 8 cm x 1 3 cm suggestive of an orbital venous varix, 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  MRI brain and orbits wo and w contrast [id number]  · No acute intracranial pathology  · Mild chronic microangiopathy  · Elongated ovoid lesion within the right orbit extending towards the orbital apex favored represent thrombosed venous varix  Mass effect and displacement of the medial and inferior rectus muscles  · No mass effect on the optic nerve  MRA head wo contrast [id number]  · No intracranial stenosis, large vessel occlusion, aneurysm or AVM  Plan  · Ophthalmology consulted  · Orbital varix right eye recommend pt to consider transfer to tertiary care center if the patient is agreeable to pursuing a definitive diagnosis and management options  Stage 3a chronic kidney disease [facility])  Assessment & Plan  Lab Results   Component Value Date    EGFR 72 06/28/2023    EGFR 57 06/27/2023    EGFR 44 06/26/2023    CREATININE 0 76 06/28/2023    CREATININE 0 92 06/27/2023    CREATININE 1 14 06/26/2023     · Cr BL 0 8-1 4  · POA 1 14  · Avoid nephrotoxins    AVB (atrioventricular block)  Assessment & Plan  · History of first-degree AV block  Bradycardic into the 40s on admission    Avoid beta-blockers    Chronic diastolic (congestive) heart failure [facility])  Assessment & Plan  Wt Readings from Last 3 Encounters:   04/13/23 85 1 kg (187 lb 9 6 oz)   03/22/23 83 5 kg (184 lb)   01/20/23 82 8 kg (182 lb 9 6 oz)     · 6/22 TTE with EF 41%, grade 1 diastolic dysfunction  · Uses Lasix as needed at home  · Not in exacerbation  Coronary artery disease  Assessment & Plan  Continue aspirin 81 mg, statin    JANICE (obstructive sleep apnea)  Assessment & Plan  CPAP at night    Hypertension  Assessment & Plan  HOME MEDICATION: Furosemide 20mg PO PRN daily w/ weight gain olmesartan 5mg PO Daily         Hypothyroid  Assessment & Plan  Continue levothyroxine 50 mcg daily    Gastroesophageal reflux disease without esophagitis  Assessment & Plan  Continue pantoprazole 40 mg daily    Depression, recurrent [facility])  Assessment & Plan  Continue Zoloft    Medical Problems     Resolved Problems  Date Reviewed: 6/28/2023   None       Discharging Resident: Otilio Mack MD  Discharging Attending: Brigida Sheehan MD  PCP: Eugenia Redd [address] [place] [address]  Admission Date:   Admission Orders (From admission, onward)     Ordered        06/27/23 1446  Inpatient Admission  Once            06/26/23 2154  Place in Observation  Once                      Discharge Date: 06/28/23    Consultations During Hospital Stay:  · Opthalmology, Neurosurgery    Procedures Performed:   · MRI    Significant Findings / Test Results:   MRI brain and orbits wo and w contrast  Result Date: 6/28/2023  Impression: No acute intracranial pathology  Mild chronic microangiopathy  Elongated ovoid lesion within the right orbit extending towards the orbital apex favored represent thrombosed venous varix  Mass effect and displacement of the medial and inferior rectus muscles  No mass effect on the optic nerve  Workstation performed: [id number]     MRA head wo contrast  Result Date: 6/28/2023  Impression: No intracranial stenosis, large vessel occlusion, aneurysm or AVM   Workstation performed: [id number]     CTA head and "neck with and without contrast  Result Date: 6/26/2023  Impression: CT brain: 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  There is a mass 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 apex anteriorly into the retrobulbar soft tissues measuring 2 8 cm in long axis and 1 3 cm in short axis with peripheral increased density on noncontrast imaging, suggestive of an orbital venous varix, 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  See above discussion of additional differential considerations  This is new compared to CT scan from 2021 and likely accounts for patient's diplopia  Surgical follow-up recommended  CT angiography: Unremarkable cervical and intracranial vasculature  Other findings: Incidental thyroid nodule(s) for which nonemergent thyroid ultrasound is recommended  This examination was marked \"immediate notification\" in Epic in order to begin the standard process by which the radiology reading room liaison alerts the referring practitioner  Workstation performed: [id number]     Incidental Findings:   · Thyroid Nodule nonemergent thyroid ultrasound recommended  · I reviewed the above mentioned incidental findings with the patient and/or family and they expressed understanding  Test Results Pending at Discharge (will require follow up): · None     Outpatient Tests Requested:  · None    Complications:  None    Reason for Admission: BRICE Epps [address] Course:   Elizabeth Dennison is a 79 y o  female patient who originally presented to the hospital on 6/26/2023 due to 1 wk hx of diplopia  PMHx: CKD III, 1st AV block, HFpEF, CAD, COD, HLD, HTN, BMI 43, JANICE, Severe aortic stenosis s/p TAVR  HPI: 1 wk of double vision initally intermitent  Eye doctor outpt found nothing  Went to ED [facility] as sxs became constant  CTA right sided orbital mass sugestive of orbital venous vaqrix possible thrombosed  [facility]o [facility]  MRI evaluation was completed  Pt was seen by neurosurgery and opthalmology    Recommended " outpatient follow up at a tertiary center  Pt was considered stable for discharge on 28JUN23  Please see above list of diagnoses and related plan for additional information  Condition at Discharge: stable    Discharge Day Visit / Exam:   Subjective:  Pt was seen in the morning and was already up and sitting in the lounge chair  Pt noted resolution of diplopia and would like to go home  Pt asked if heart rate in 40s was normal for her which she did not know  Pt denied any overt symptoms of bradycardia  Vitals: Blood Pressure: 137/62 (06/28/23 0723)  Pulse: 57 (06/28/23 0738)  Temperature: 98 2 °F (36 8 °C) (06/28/23 0723)  Respirations: 18 (06/27/23 2139)  SpO2: 92 % (06/28/23 0738)  Exam:   Physical Exam  Vitals and nursing note reviewed  Constitutional:       General: She is not in acute distress  Appearance: Normal appearance  She is obese  She is not ill-appearing or toxic-appearing  HENT:      Head: Normocephalic and atraumatic  Nose: Nose normal       Mouth/Throat:      Mouth: Mucous membranes are dry  Pharynx: Oropharynx is clear  No oropharyngeal exudate or posterior oropharyngeal erythema  Eyes:      General: No scleral icterus  Right eye: No discharge  Left eye: No discharge  Extraocular Movements: Extraocular movements intact  Conjunctiva/sclera: Conjunctivae normal    Cardiovascular:      Rate and Rhythm: Regular rhythm  Bradycardia present  Heart sounds: No murmur heard  Pulmonary:      Effort: Pulmonary effort is normal  No respiratory distress  Breath sounds: Normal breath sounds  No stridor  No wheezing, rhonchi or rales  Abdominal:      General: Abdomen is flat  There is no distension  Palpations: Abdomen is soft  Tenderness: There is no abdominal tenderness  There is no guarding or rebound  Musculoskeletal:      Cervical back: Normal range of motion and neck supple  No rigidity  Right lower leg: No edema        Left lower leg: No edema  Lymphadenopathy:      Cervical: No cervical adenopathy  Skin:     General: Skin is warm and dry  Coloration: Skin is not jaundiced  Neurological:      Mental Status: She is alert and oriented to person, place, and time  Mental status is at baseline  Comments: No diplopia noted on exam   Psychiatric:         Mood and Affect: Mood normal          Behavior: Behavior normal           Discussion with Family: Patient declined call to   Discharge instructions/Information to patient and family:   See after visit summary for information provided to patient and family  Provisions for Follow-Up Care:  See after visit summary for information related to follow-up care and any pertinent home health orders  Disposition:   Home    Planned Readmission: None    Discharge Medications:  See after visit summary for reconciled discharge medications provided to patient and/or family        **Please Note: This note may have been constructed using a voice recognition system**

## 2023-06-27 NOTE — ASSESSMENT & PLAN NOTE
· History of first-degree AV block  Bradycardic into the 40s on admission  Avoid beta-blockers  · Monitor on telemetry overnight

## 2023-06-27 NOTE — ASSESSMENT & PLAN NOTE
· 1 week of intermittent diplopia  Outpatient work-up was unremarkable  Had persistent diplopia today so presented to [facility] ED  · CTA head/neck: There is a mass 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 apex anteriorly into the retrobulbar soft tissues measuring 2 8 cm in long axis and 1 3 cm in short axis with peripheral increased density on noncontrast imaging, suggestive of an orbital venous varix, 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  · Neuro ED discussed with neurosurgery and ophthalmology, who recommended transfer to [facility] for eval   Neurosurgery recommended MRI/MRA brain  · Does have some mild APD on the right pupil on admission      Plan  · MRI/MRA brain and orbits  · Ophthalmology consulted, appreciate recommendations  · Neurosurgery consulted, appreciate recommendations

## 2023-06-27 NOTE — CASE MANAGEMENT
Case Management Assessment & Discharge Planning Note    Patient name Thelma FERNANDEZ /S -24 MRN [id number]  : 1940 Date 2023       Current Admission Date: 2023  Current Admission Diagnosis:Orbital mass   Patient Active Problem List    Diagnosis Date Noted   • Orbital mass 2023   • Urinary incontinence 10/26/2022   • 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2022   • Stage 3a chronic kidney disease ([facility] ) 2022   • Radiculopathy, lumbar region 07/10/2021   • Depression, recurrent ([facility] ) 2021   • Osteopenia 10/22/2020   • Insomnia 2020   • Depression with anxiety 2019   • 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/2018   • Arthritis    • AVB (atrioventricular block)    • COPD, mild (HCC)    • Coronary artery disease    • JANICE on CPAP    • Gastroesophageal reflux disease without esophagitis 2018   • Anemia 2018   • Obesity, morbid ([facility] ) 2018   • Chronic diastolic (congestive) heart failure ([facility] ) 2018   • Reactive airway disease without complication 968   • JANICE (obstructive sleep apnea) 2018   • Hyperlipidemia 2017   • Hypertension 2017   • Hypothyroid 10/29/2017   • LVH (left ventricular hypertrophy) 2016   • Mitral annular calcification 2016   • Mitral valve stenosis 2016   • Pulmonary hypertension ([facility] ) 2016      LOS (days): 1  Geometric Mean LOS (GMLOS) (days): 3 00  Days to GMLOS:2 9     OBJECTIVE:    Risk of Unplanned Readmission Score: 11 37         Current admission status: Inpatient       Preferred Pharmacy:   [address] [place] [address]  Phone: [phone] Fax: [phone]    Primary Care Provider: MABEL Castelan    Primary Insurance: MEDICARE  Secondary Insurance:     ASSESSMENT:  [place] Proxies    There are no active Health Care Proxies on file                        Patient Information  Admitted from[de-identified] Home  Mental Status: Alert  During Assessment patient was accompanied by: Other-Comment Bettie Leon, POA/friend)  Assessment information provided by[de-identified] Patient, Raghav Carrillo Bettie Leon, at bedside)  Primary Caregiver: Self  Support Systems: SelfRobert 61 of Residence: [place] [address] do you live in?: [address] entry access options   Select all that apply : Ramp (to back entrance)  Type of Current Residence: Ranch  In the last 12 months, was there a time when you were not able to pay the mortgage or rent on time?: No  In the last 12 months, how many places have you lived?: 1  In the last 12 months, was there a time when you did not have a steady place to sleep or slept in a shelter (including now)?: No  Homeless/housing insecurity resource given?: N/A  Living Arrangements: Lives Alone (has friends who rent out lower level of home, but they will be moving out soon)    Activities of Daily Living Prior to Admission  Functional Status: Independent  Completes ADLs independently?: Yes  Ambulates independently?: Yes  Does patient use assisted devices?: Yes  Assisted Devices (DME) used: Liz Villegas, Shower Chair  Does patient currently own DME?: Yes  What DME does the patient currently own?: Shower Chair, Liz Villegas  Does the patient have a history of Short-Term Rehab?: Yes ([place] and through [facility]  following knee replacements)  Does patient have a history of HHC?: Yes (following past surgeries; has not had home care for about 4-5 years)  Does patient currently have [facility]?: No         Patient Information Continued  Does patient have prescription coverage?: Yes  Within the past 12 months, you worried that your food would run out before you got the money to buy more : Never true  Within the past 12 months, the food you bought just didn't last and you didn't have money to get more : Never true  Food insecurity resource given?: N/A  Does patient receive dialysis treatments?: No  Does patient have a history of substance abuse?: No  Does patient have a history of Mental Health Diagnosis?: No         Means of Transportation  Means of Transport to Appts[de-identified] Drives Self  In the past 12 months, has lack of transportation kept you from medical appointments or from getting medications?: No  In the past 12 months, has lack of transportation kept you from meetings, work, or from getting things needed for daily living?: No  Was application for public transport provided?: N/A        DISCHARGE DETAILS:    Discharge planning discussed with[de-identified] patient and friend/POA, Sania Alexander, at bedside        CM contacted family/caregiver?: Yes (Bong Singh, at bedside)  Were Treatment Team discharge recommendations reviewed with patient/caregiver?: Yes  Did patient/caregiver verbalize understanding of patient care needs?: Yes  Were patient/caregiver advised of the risks associated with not following Treatment Team discharge recommendations?: Yes    Contacts  Patient Contacts: kacy Shrestha/POA  Relationship to Patient[de-identified] Friend  Contact Method: In Person  Reason/Outcome: Continuity of Care, Emergency Contact, Referral, Discharge Planning              Other Referral/Resources/Interventions Provided:  Interventions: Other (Specify) (pending MRI, PT/OT recommendations)  Referral Comments: Patient transferred to [address] [place] [address] from [facility] for ophthalmology consult; MRI pending  PT/OT evals ordered and also pending  Met with patient at bedside to complete assessment; friend/POA, Roberto Carlos Hernandez, also at bedside - identifies self as patient's 's spouse  Patient states that Sania Alexander has both financial and medical POA  Patient reports that she lives alone in a single-level ranch home that has a ramp access through back door  Reports that she has individuals staying in the bottom level of her home, but they will be moving out shortly  Patient usually ambulates with her rolling walker   Also has shower seat to use when bathing  Patient states she's been independent with ADLs and ambulation prior to hospitalization  Does drive, but friends can transport when needed  Reports history of rehab through [address] in the past; also reports history of home care services about 4-5 years ago; no current services  Patient inquiring about MRI and therapy evals; call made to MRI department who confirmed she was on to be seen, but unable to provide anticipated time for MRI to be done (either late tonight vs tomorrow)  TT to PT who reports they will see patient in AM  Patient aware of same  Will follow-up after PT/OT evaluations are complete      Would you like to participate in our [address] service program?  : No - Declined          Transport at Discharge : Family

## 2023-06-27 NOTE — CONSULTS
This 26-year-old woman presents with a 2-week history of double vision  It is intermittent in nature and when she was evaluated by her ophthalmologist several days ago there were no clinical findings  The double vision returned and she came to the emergency room for evaluation  Past ocular history is significant for cataract surgery in both eyes  CT of the orbits shows a mass in the right eye beginning at the orbital apex extending anteriorly  Per radiology, the most likely entity is an orbital varix  Clinical evaluation prior to transfer noted a mild afferent pupil defect on the right side  On examination, visual acuity with correction at near is 20/100 in each eye  The pupils are equal round and reactive to light  I do not appreciate an afferent defect at this time  Extraocular movements appear unrestricted  The patient has binocular vertical diplopia, worse on right gaze, not present on right or left head tilt  The external examination is unremarkable  Intraocular pressures are 15 and 19  Confrontation visual fields are full  Both eyes were dilated with Mydriacyl and Vinod-Synephrine at 7:30 AM     There are well centered posterior chamber intraocular lenses noted in both eyes  The vitreous cavities are clear in both eyes  Both optic nerves are pink and flat with 0 2 cups  The macula, vessels and periphery are unremarkable in both eyes  Impression: Orbital varix right eye  Plan: Consider transfer to tertiary care center if the patient is agreeable to pursuing a definitive diagnosis and management options

## 2023-06-27 NOTE — INCIDENTAL FINDINGS
The following findings require follow up:  Radiographic finding   Finding: Incidental thyroid nodule(s) for which nonemergent thyroid ultrasound is recommended     Follow up required: YES   Follow up should be done within 1 month(s)

## 2023-06-27 NOTE — ASSESSMENT & PLAN NOTE
Cholesterol is at goal   Continue current medications  Tissue Cultured Epidermal Autograft Text: The defect edges were debeveled with a #15 scalpel blade.  Given the location of the defect, shape of the defect and the proximity to free margins a tissue cultured epidermal autograft was deemed most appropriate.  The graft was then trimmed to fit the size of the defect.  The graft was then placed in the primary defect and oriented appropriately.

## 2023-06-27 NOTE — ASSESSMENT & PLAN NOTE
Plan of care    I was called this morning at approximately 8 AM by the [facility] we communicated via my PA with the hospitalist  After review of scans this is an 80-year-old gentleman admitted for influenza who suffered an in-hospital fall by report.  He sustained a left zygomatic arch and orbital fracture that is subtentorial.  There is a scant amount of subarachnoid hemorrhage that appears to be traumatic in nature and consistent with trauma he also has a large left-sided subgaleal hematoma    Patient is neurologically stable by reports of the physician that I spoke to personally    I explained that treatment of this patient is going to require significant multidisciplinary care of the left zygomatic arch fracture and orbital fracture from a trauma standpoint and that from a neurosurgical standpoint he is going to require hydration and neurologic monitoring repeat scans but but at present I suspect that this is all good to be nonsurgical management.  I recommended to phone call to [facility] to see their oral maxillofacial trauma surgeons would want to care for the patient as it does not represent a neurosurgical emergency at present we are going to repeat a CT scan shortly to make sure there is no evolution of his issues    He has my cell phone he is got a call me as the patient has requested for transfer we would be happy to consult on the patient if need be    Documentation in this note represents care that is been provided of the last hour and a half   Wt Readings from Last 3 Encounters:   04/13/23 85 1 kg (187 lb 9 6 oz)   03/22/23 83 5 kg (184 lb)   01/20/23 82 8 kg (182 lb 9 6 oz)     · 6/22 TTE with EF 14%, grade 1 diastolic dysfunction  · Uses Lasix as needed at home  · Not in exacerbation  Track I's and O's

## 2023-06-27 NOTE — ASSESSMENT & PLAN NOTE
HOME MEDICATION: Furosemide 20mg PO PRN daily w/ weight gain olmesartan 5mg PO Daily     PLAN:  -ARB held in setting of norm/high K levels on 27JUN23  -Monitor BP  -Restart ARB when K levels are below 5 0  -Consider alternative antihypertensive

## 2023-06-27 NOTE — ASSESSMENT & PLAN NOTE
· 1wk diplopia initially intermittent then constant as of 34PRA70    · Outpatient work-up was unremarkable  · Does have some mild APD on the right pupil on admission  IMAGING  CTA head and neck wit and witout contrast [id number]  · 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  · Mass 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 apex anteriorly into the retrobulbar soft tissues measuring 2 8 cm x 1 3 cm suggestive of an orbital venous varix, 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  Plan  · MRI/MRA brain and orbits pending  · Ophthalmology consulted  · Orbital varix right eye recommend pt to consider transfer to tertiary care center if the patient is agreeable to pursuing a definitive diagnosis and management options  · Neurosurgery consulted:   · Review of CT: given age likely benign meningioma  · No surgery is planned  · Recommendation: F/u MRI in 3 months

## 2023-06-27 NOTE — TELEPHONE ENCOUNTER
6/28/23:   PER [facility] PATIENT DOES NOT NEED TO FOLLOW WITH US  I have reviewed the relevant information from the patient's chart and agree with the advanced practitioner's note  Lesion is within right orbit, possible thrombosed varix, no intracranial pathology  Further work-up and management per Ophthalmology and primary team  No neurosurgical intervention or follow-up indicated      Paulette Silverio MD     6/27/23: [facility] W/ DR GIRARD  CANCELLED    IMAGING:  MRI BRAIN - NO AUTH REQUIRED      PER: [facility]

## 2023-06-27 NOTE — ASSESSMENT & PLAN NOTE
Lab Results   Component Value Date    EGFR 57 06/27/2023    EGFR 44 06/26/2023    EGFR 68 03/17/2023    CREATININE 0 92 06/27/2023    CREATININE 1 14 06/26/2023    CREATININE 0 80 03/17/2023     · Cr BL 0 8-1 4  · POA 1 14  · Avoid nephrotoxins

## 2023-06-27 NOTE — CONSULTS
Consultation - Neurosurgery   Enzo Frank 79 y o  female MRN: [id number]  Unit/Bed#: S -01 Encounter: [id number]    Images reviewed at morning rounds on 6/27/2023 at 7 AM  Patient was seen and examined on 6/27/2023 at 8 AM    Inpatient consult to Neurosurgery  Consult performed by: Blaine Calvo PA-C  Consult ordered by: Luis Hurtado,           Assessment/Plan               Assessment:  1  Right Orbital mass  2  History of diplopia        Plan:   · Exam: A&OX3, Right pupil dilates  to light ( possible Afferent pupillary defect), Left eye s/p Cataract removal  Finger to nose test normal and without drift bilaterally  Strength is 5/5 and sensation to light intact throughout  DTR 2+ without clonus bilaterally  · Imaging is reviewed personally and findings as follows:   · CTA head and neck with and without contrast on 6/26/2023 demonstrates a 2 8 cm x 1 3 cm mass extending from the right orbital apex anteriorly into the retrobulbar soft tissues with peripheral increased density on noncontrast imaging suggestive of an orbital venous varix, 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     This is new when compared to CT scan done in 2021  CT angiography unremarkable cervical and intracranial vasculature  · MRI & MRA of brain ordered-pending  · Pain control: Per primary team  · DVT ppx: SCDs  · Seizure ppx: None  · Activity: As tolerated  · PT/OT evaluation & treatment   · Medical Mx: Per primary team  · Neurocheck: Close neuro monitoring  Stat CT head if GCS drops 2pts/1H  · Recommend Ophthalmology evaluation  · Imaging was reviewed, likely meningioma  Given her age and intermittent Diplopia, no emergency neurosurgery procedure is anticipated at this juncture  Recommend follow-up with [facility] OP clinic  in 3 months with brain MRI w/wo contrast  Call with question or concern  Will sign off        History of Present Illness     HPI: Enzo Frank is a 79 y o  female with PMHx of congestive heart failure, coronary artery disease, COPD, atrial ventricular block, hypertension, hypothyroidism, morbid obesity, pulmonary hypertension, severe aortic stenosis, anemia, anxiety, hyperlipidemia transferred from [facility] after she developed diplopia for 1 week and a CTA head and neck with and without contrast demonstrates a 2 8 cm x 1 3 cm right orbital apex mass extending to retrobulbar soft tissues likely to be orbital venous varix  Patient reports intermittent diplopia sometimes blurry vision that started about a week ago  Patient has also frontal headache, which seems chronic and he feels better with Tylenol  She denies any vertigo, loss of consciousness, seizures, nausea, vomiting, speech or swallowing problem  Baseline gait issues uses walker for ambulation  No weakness in the extremities, numbness or paresthesia  Denies any bowel/bladder dysfunction  Denies history of cancer  She denies history of fever, chills, rigors, cough or chest pain  Patient denies history of diabetes mellitus, stroke, bleeding disorder or taking anticoagulant medications, except baby aspirin  No history of smoking cigarettes or drinking alcohol  Review of Systems   Constitutional: Negative for activity change, chills, fever and unexpected weight change  HENT: Negative for trouble swallowing and voice change  Eyes: Positive for visual disturbance  Negative for photophobia  Respiratory: Negative for apnea, cough, chest tightness, shortness of breath and wheezing  Gastrointestinal: Negative for nausea and vomiting  Genitourinary: Negative for difficulty urinating  Musculoskeletal: Positive for gait problem  Neurological: Positive for headaches  Negative for dizziness, tremors, seizures, syncope, facial asymmetry, speech difficulty, weakness, light-headedness and numbness  Psychiatric/Behavioral: Negative for confusion         Historical Information   Past Medical History:   Diagnosis Date   • Anemia 08/22/2018   • Anxiety • Arthritis    • AVB (atrioventricular block)     first degree   • Cataract    • CHF (congestive heart failure) (HCC)    • COPD, mild (HCC)    • Coronary artery disease    • Dislocation of right shoulder joint    • Frequent UTI    • GERD (gastroesophageal reflux disease)    • H/O: pneumonia    • Heme positive stool    • Hyperlipidemia    • Hypertension    • Hypothyroidism    • Morbid obesity with BMI of 50 0-59 9, adult ([facility] [address] )    • Obesity, morbid ([facility] ) 08/22/2018   • JANICE on CPAP    • Pulmonary hypertension ([facility] ) 08/22/2018   • Severe aortic stenosis    • Simple goiter    • Skin cyst     within the armpits, right   • Wears glasses      Past Surgical History:   Procedure Laterality Date   • BREAST BIOPSY     • CARDIAC CATHETERIZATION     • CARPAL TUNNEL RELEASE Bilateral    • CHOLECYSTECTOMY     • DILATION AND CURETTAGE OF UTERUS     • HYSTEROSCOPY     • MASTOID SURGERY     • HI COLONOSCOPY FLX DX W/COLLJ SPEC WHEN PFRMD N/A 9/6/2018    Procedure: COLONOSCOPY;  Surgeon: Daily Harris MD;  Location: [facility]; Service: Gastroenterology   • HI ECHO TRANSESOPHAG R-T 2D W/PRB IMG ACQUISJ I&R N/A 10/9/2018    Procedure: INTRA-OP TRANSESOPHAGEAL ECHOCARDIOGRAM (GARRISON); Surgeon: Radha Menchaca DO;  Location: [facility];  Service: Cardiac Surgery   • HI ESOPHAGOGASTRODUODENOSCOPY TRANSORAL DIAGNOSTIC N/A 8/31/2018    Procedure: ESOPHAGOGASTRODUODENOSCOPY (EGD); Surgeon: Daily Harris MD;  Location: [facility]; Service: Gastroenterology   • HI REPLACE AORTIC VALVE OPENFEMORAL ARTERY APPROACH N/A 10/9/2018    Procedure: REPLACEMENT AORTIC VALVE TRANSCATHETER (TAVR) TRANSFEMORAL W/ 23 MM MENDOZA NOE S3 VALVE (ACCESS OF LEFT);   Surgeon: Radha Menchaca DO;  Location: [facility];  Service: Cardiac Surgery   • TOTAL HIP ARTHROPLASTY Left 2007   • TOTAL KNEE ARTHROPLASTY Bilateral      Social History     Substance and Sexual Activity   Alcohol Use No     Social History     Substance and Sexual Activity Drug Use No     Social History     Tobacco Use   Smoking Status Never   Smokeless Tobacco Never     Family History   Problem Relation Age of Onset   • Diabetes Mother    • Stroke Mother    • Cancer Father    • Lung cancer Father    • Diabetes Sister    • Heart disease Sister    • Hypertension Sister    • Coronary artery disease Family    • Diabetes Family    • Hypertension Family    • Cancer Family    • Stroke Family    • Thyroid disease Neg Hx        Meds/Allergies   all current active meds have been reviewed, current meds:   Current Facility-Administered Medications   Medication Dose Route Frequency   • acetaminophen (TYLENOL) tablet 975 mg  975 mg Oral Q6H PRN   • aspirin (ECOTRIN LOW STRENGTH) EC tablet 81 mg  81 mg Oral Daily   • enoxaparin (LOVENOX) subcutaneous injection 40 mg  40 mg Subcutaneous Q12H   • levothyroxine tablet 50 mcg  50 mcg Oral Early Morning   • oxybutynin (DITROPAN-XL) 24 hr tablet 10 mg  10 mg Oral Daily   • pantoprazole (PROTONIX) EC tablet 40 mg  40 mg Oral Early Morning   • pravastatin (PRAVACHOL) tablet 80 mg  80 mg Oral Daily With Dinner   • sertraline (ZOLOFT) tablet 100 mg  100 mg Oral Daily    and PTA meds:   Prior to Admission Medications   Prescriptions Last Dose Informant Patient Reported? Taking?    Ascorbic Acid, Vitamin C, (VITAMIN C) 100 MG tablet   Yes No   Sig: Take 100 mg by mouth daily   Calcium Carb-Cholecalciferol (CALCIUM 600 + D PO)  Self Yes No   Sig: Take 1 tablet by mouth 2 (two) times a day   Calcium Carb-Cholecalciferol 600-10 MG-MCG TABS   Yes No   Sig: Take 1 tablet by mouth   Cranberry 1000 MG CAPS   Yes No   Sig: Take by mouth   Cranberry 250 MG TABS  Self Yes No   Sig: Take by mouth   Diclofenac Sodium (VOLTAREN) 1 %  Self No No   Sig: Apply 2 g topically 4 (four) times a day   Fesoterodine Fumarate ER (Toviaz) 8 MG TB24  Self Yes No   Sig: Take 8 mg by mouth daily    acetaminophen (TYLENOL) 500 mg tablet   Yes No   Sig: Take 500 mg by mouth every 6 (six) hours as needed   albuterol (2 5 mg/3 mL) 0 083 % nebulizer solution  Self No No   Sig: Take 3 mL (2 5 mg total) by nebulization every 6 (six) hours as needed for wheezing or shortness of breath   Patient taking differently: Take 2 5 mg by nebulization every 6 (six) hours as needed for wheezing or shortness of breath PRN   albuterol (PROVENTIL HFA,VENTOLIN HFA) 90 mcg/act inhaler  Self No No   Sig: Inhale 2 puffs every 6 (six) hours as needed for wheezing   aspirin (ECOTRIN LOW STRENGTH) 81 mg EC tablet  Self No No   Sig: Take 1 tablet (81 mg total) by mouth daily   b complex vitamins capsule  Self Yes No   Sig: Take 1 capsule by mouth 2 (two) times a day     budesonide-formoterol (Symbicort) 160-4 5 mcg/act inhaler  Self No No   Sig: Inhale 2 puffs 2 (two) times a day   furosemide (LASIX) 20 mg tablet  Self No No   Sig: Take 1 tablet (20 mg total) by mouth daily as needed (wt gain) In the morning   ibuprofen (MOTRIN) 200 mg tablet   Yes No   Sig: Take 200 mg by mouth every 6 (six) hours as needed   levothyroxine 50 mcg tablet   No No   Sig: TAKE 1 TABLET BY MOUTH EVERY DAY   olmesartan (BENICAR) 5 mg tablet   No No   Sig: TAKE 2 TABLETS BY MOUTH EVERY DAY   omeprazole (PriLOSEC) 40 MG capsule   No No   Sig: TAKE 1 CAPSULE BY MOUTH TWICE A DAY   sertraline (ZOLOFT) 100 mg tablet   No No   Sig: TAKE 1 TABLET BY MOUTH EVERY DAY   simvastatin (ZOCOR) 40 mg tablet   No No   Sig: TAKE 1 TABLET BY MOUTH EVERYDAY AT BEDTIME      Facility-Administered Medications: None     Allergies   Allergen Reactions   • Latex Rash   • Neosporin [Neomycin-Bacitracin Zn-Polymyx] Rash and Other (See Comments)     hives per [facility] order       Objective   I/O     None          Physical Exam  Constitutional:       Appearance: She is obese  HENT:      Head: Normocephalic and atraumatic  Eyes:      Extraocular Movements: Extraocular movements intact  Cardiovascular:      Rate and Rhythm: Normal rate     Pulmonary:      Effort: Pulmonary effort is normal    Musculoskeletal:         General: No tenderness  Cervical back: Normal range of motion  Neurological:      Mental Status: She is alert and oriented to person, place, and time  GCS: GCS eye subscore is 4  GCS verbal subscore is 5  GCS motor subscore is 6  Sensory: Sensation is intact  Motor: Motor function is intact  Coordination: Finger-Nose-Finger Test normal       Deep Tendon Reflexes:      Reflex Scores:       Tricep reflexes are 2+ on the right side and 2+ on the left side  Bicep reflexes are 2+ on the right side and 2+ on the left side  Brachioradialis reflexes are 2+ on the right side and 2+ on the left side  Patellar reflexes are 2+ on the right side and 2+ on the left side  Achilles reflexes are 2+ on the right side and 2+ on the left side  Psychiatric:         Speech: Speech normal        Neurologic Exam     Mental Status   Oriented to person, place, and time  Speech: speech is normal   Level of consciousness: alert    Cranial Nerves     CN III, IV, VI   Right pupil: Size: 3 mm  Shape: regular  Reactivity: brisk  Left pupil: Size: 2 mm  Shape: regular  Reactivity: brisk     Nystagmus: none     Motor Exam   Muscle bulk: normal  Overall muscle tone: normal  Right arm tone: normal  Left arm tone: normal  Right arm pronator drift: absent  Left arm pronator drift: absent  Right leg tone: normal  Left leg tone: normal    Sensory Exam   Light touch normal      Gait, Coordination, and Reflexes     Coordination   Finger to nose coordination: normal    Reflexes   Right brachioradialis: 2+  Left brachioradialis: 2+  Right biceps: 2+  Left biceps: 2+  Right triceps: 2+  Left triceps: 2+  Right patellar: 2+  Left patellar: 2+  Right achilles: 2+  Left achilles: 2+  Right : 2+  Left : 2+  Right Dhaliwal: absent  Left Dhaliwal: absent  Right ankle clonus: absent  Left pendular knee jerk: absent      Vitals:Blood pressure 147/93, pulse (!) 51, "SpO2 93 %, not currently breastfeeding  ,There is no height or weight on file to calculate BMI  Lab Results:   Results from last 7 days   Lab Units 06/26/23 2225 06/26/23  1338   WBC Thousand/uL 11 56* 8 20   HEMOGLOBIN g/dL 12 1 11 5   HEMATOCRIT % 39 6 36 8   PLATELETS Thousands/uL 288 278   NEUTROS PCT %  --  54   MONOS PCT %  --  10   EOS PCT %  --  7*     Results from last 7 days   Lab Units 06/27/23  0444 06/26/23  1338   POTASSIUM mmol/L 5 3 4 8   CHLORIDE mmol/L 108 106   CO2 mmol/L 28 26   BUN mg/dL 33* 45*   CREATININE mg/dL 0 92 1 14   CALCIUM mg/dL 9 6 10 3*   ALK PHOS U/L  --  61   ALT U/L  --  23   AST U/L  --  27             Results from last 7 days   Lab Units 06/26/23 2225 06/26/23  1338   INR  1 02 1 05   PTT seconds  --  30     No results found for: \"TROPONINT\"  ABG:  Lab Results   Component Value Date    PHART 7 381 10/10/2018    TRW8SHK 44 7 (H) 10/10/2018    PO2ART 95 1 10/10/2018    AON2CSN 25 9 10/10/2018    BEART 0 6 10/10/2018    SOURCE Line, Arterial 10/10/2018       Imaging Studies: I have personally reviewed pertinent reports   , I have personally reviewed pertinent films in PACS and I have personally reviewed pertinent films in PACS with a Radiologist     EKG, Pathology, and Other Studies: I have personally reviewed pertinent reports   , I have personally reviewed pertinent films in PACS and I have personally reviewed pertinent films in PACS with a Radiologist     VTE Prophylaxis: Sequential compression device (Venodyne)     Code Status: Level 3 - DNAR and DNI  Advance Directive and Living Will:      Power of :    POLST:      Counseling / Coordination of Care  I spent 20 minutes with the patient      "

## 2023-06-27 NOTE — TELEPHONE ENCOUNTER
.Caller: pt    Doctor: Winston    Reason for call: Pt called in that she is in the hospital right now. She will not be able to make her procedure. She will call back once she is out of the hospital    Call back#: [phone]

## 2023-06-27 NOTE — ASSESSMENT & PLAN NOTE
Wt Readings from Last 3 Encounters:   04/13/23 85 1 kg (187 lb 9 6 oz)   03/22/23 83 5 kg (184 lb)   01/20/23 82 8 kg (182 lb 9 6 oz)     · 6/22 TTE with EF 05%, grade 1 diastolic dysfunction  · Uses Lasix as needed at home  · Not in exacerbation  Track I's and O's

## 2023-06-27 NOTE — PROGRESS NOTES
[facility]  Progress Note  Name: Rosi Antoine  MRN: [id number]  Unit/Bed#: S -97 I Date of Admission: 6/26/2023   Date of Service: 6/27/2023 I Hospital Day: 1    Assessment/Plan   * Orbital mass  Assessment & Plan  · 1wk diplopia initially intermittent then constant as of [id number]    · Outpatient work-up was unremarkable  · Does have some mild APD on the right pupil on admission  IMAGING  CTA head and neck wit and witout contrast [id number]  · 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  · Mass 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 apex anteriorly into the retrobulbar soft tissues measuring 2 8 cm x 1 3 cm suggestive of an orbital venous varix, 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  Plan  · MRI/MRA brain and orbits pending  · Ophthalmology consulted  · Orbital varix right eye recommend pt to consider transfer to tertiary care center if the patient is agreeable to pursuing a definitive diagnosis and management options  · Neurosurgery consulted:   · Review of CT: given age likely benign meningioma  · No surgery is planned  · Recommendation: F/u MRI in 3 months  Stage 3a chronic kidney disease [facility])  Assessment & Plan  Lab Results   Component Value Date    EGFR 57 06/27/2023    EGFR 44 06/26/2023    EGFR 68 03/17/2023    CREATININE 0 92 06/27/2023    CREATININE 1 14 06/26/2023    CREATININE 0 80 03/17/2023     · Cr BL 0 8-1 4  · POA 1 14  · Avoid nephrotoxins    AVB (atrioventricular block)  Assessment & Plan  · History of first-degree AV block  Bradycardic into the 40s on admission  Avoid beta-blockers  · Monitor on telemetry overnight  Chronic diastolic (congestive) heart failure (HCC)  Assessment & Plan  Wt Readings from Last 3 Encounters:   04/13/23 85 1 kg (187 lb 9 6 oz)   03/22/23 83 5 kg (184 lb)   01/20/23 82 8 kg (182 lb 9 6 oz)     · 6/22 TTE with EF 83%, grade 1 diastolic dysfunction  · Uses Lasix as needed at home  · Not in exacerbation    Track I's and O's  Coronary artery disease  Assessment & Plan  Continue aspirin 81 mg, statin    JANICE (obstructive sleep apnea)  Assessment & Plan  CPAP at night    Hypertension  Assessment & Plan  HOME MEDICATION: Furosemide 20mg PO PRN daily w/ weight gain olmesartan 5mg PO Daily     PLAN:  -ARB held in setting of norm/high K levels on   -Monitor BP  -Restart ARB when K levels are below 5 0  -Consider alternative antihypertensive     Hypothyroid  Assessment & Plan  Continue levothyroxine 50 mcg daily    Gastroesophageal reflux disease without esophagitis  Assessment & Plan  Continue pantoprazole 40 mg daily    Depression, recurrent (HCC)  Assessment & Plan  Continue Zoloft             VTE Pharmacologic Prophylaxis: VTE Score: 5 High Risk (Score >/= 5) - Pharmacological DVT Prophylaxis Ordered: enoxaparin (Lovenox)  Sequential Compression Devices Ordered  Patient Centered Rounds: I performed bedside rounds with nursing staff today  Discussions with Specialists or Other Care Team Provider: Catrachita Yeager    Education and Discussions with Family / Patient: Updated  (friend) via phone  Current Length of Stay: 1 day(s)  Current Patient Status: Observation   Discharge Plan: Anticipate discharge in 24-48 hrs to home  Code Status: Level 3 - DNAR and DNI    Subjective:   Pt was seen in the morning and was awake and resting  Pt endorsed continued frontal HA  Diplopia still present  Pt has been NPO since midnight  Pt endorsed needing to urinate  Pt is aware of the plan for an MRI today and that neurosurgery will evaluate her  Objective:     Vitals:   Temp (24hrs), Av 6 °F (36 4 °C), Min:97 6 °F (36 4 °C), Max:97 6 °F (36 4 °C)    Temp:  [97 6 °F (36 4 °C)] 97 6 °F (36 4 °C)  HR:  [51-59] 57  Resp:  [20-21] 21  BP: (147-181)/(58-93) 151/58  SpO2:  [90 %-96 %] 90 %  There is no height or weight on file to calculate BMI       Input and Output Summary (last 24 hours):   No intake or output data in the 24 hours ending 06/27/23 1428    Physical Exam:   Physical Exam  Vitals and nursing note reviewed  Constitutional:       General: She is not in acute distress  Appearance: Normal appearance  She is obese  She is not ill-appearing or toxic-appearing  HENT:      Head: Normocephalic and atraumatic  Nose: Nose normal       Mouth/Throat:      Mouth: Mucous membranes are dry  Pharynx: Oropharynx is clear  No oropharyngeal exudate or posterior oropharyngeal erythema  Eyes:      General: No scleral icterus  Right eye: No discharge  Left eye: No discharge  Extraocular Movements: Extraocular movements intact  Conjunctiva/sclera: Conjunctivae normal       Comments: Mild right APD noted   Cardiovascular:      Rate and Rhythm: Regular rhythm  Bradycardia present  Heart sounds: No murmur heard  Pulmonary:      Effort: Pulmonary effort is normal  No respiratory distress  Breath sounds: Normal breath sounds  No stridor  No wheezing, rhonchi or rales  Abdominal:      General: Abdomen is flat  There is no distension  Palpations: Abdomen is soft  Tenderness: There is no abdominal tenderness  There is no guarding or rebound  Musculoskeletal:      Cervical back: Normal range of motion and neck supple  No rigidity  Right lower leg: No edema  Left lower leg: No edema  Lymphadenopathy:      Cervical: No cervical adenopathy  Skin:     General: Skin is warm and dry  Coloration: Skin is not jaundiced  Neurological:      Mental Status: She is alert and oriented to person, place, and time  Mental status is at baseline     Psychiatric:         Mood and Affect: Mood normal          Behavior: Behavior normal           Additional Data:     Labs:  Results from last 7 days   Lab Units 06/26/23  2225 06/26/23  1338   WBC Thousand/uL 11 56* 8 20   HEMOGLOBIN g/dL 12 1 11 5   HEMATOCRIT % 39 6 36 8   PLATELETS Thousands/uL 288 278   NEUTROS PCT %  --  54 LYMPHS PCT %  --  27   MONOS PCT %  --  10   EOS PCT %  --  7*     Results from last 7 days   Lab Units 06/27/23  0444 06/26/23  1338   SODIUM mmol/L 140 138   POTASSIUM mmol/L 5 3 4 8   CHLORIDE mmol/L 108 106   CO2 mmol/L 28 26   BUN mg/dL 33* 45*   CREATININE mg/dL 0 92 1 14   ANION GAP mmol/L 4 6   CALCIUM mg/dL 9 6 10 3*   ALBUMIN g/dL  --  3 9   TOTAL BILIRUBIN mg/dL  --  0 46   ALK PHOS U/L  --  61   ALT U/L  --  23   AST U/L  --  27   GLUCOSE RANDOM mg/dL 87 92     Results from last 7 days   Lab Units 06/26/23  2225   INR  1 02                   Lines/Drains:  Invasive Devices     Peripheral Intravenous Line  Duration           Peripheral IV 06/26/23 Left Antecubital 1 day                  Telemetry:  Telemetry Orders (From admission, onward)             24 Hour Telemetry Monitoring  Continuous x 24 Hours (Telem)        Question:  Reason for 24 Hour Telemetry  Answer:  Arrhythmias requiring acute medical intervention / PPM or ICD malfunction                 Telemetry Reviewed: Sinus Bradycardia  Indication for Continued Telemetry Use: Arrthymias requiring medical therapy             Imaging: Reviewed radiology reports from this admission including: CT Brain    Recent Cultures (last 7 days):         Last 24 Hours Medication List:   Current Facility-Administered Medications   Medication Dose Route Frequency Provider Last Rate   • acetaminophen  975 mg Oral Q6H PRN Stephanie Rico MD     • aspirin  81 mg Oral Daily Kenny Le, DO     • enoxaparin  40 mg Subcutaneous Q12H Kenny Le, DO     • levothyroxine  50 mcg Oral Early Morning Kenny Le, DO     • oxybutynin  10 mg Oral Daily Kenny Le, DO     • pantoprazole  40 mg Oral Early Morning Kenny Silvaey, DO     • pravastatin  80 mg Oral Daily With Kinjal Woodk, DO     • sertraline  100 mg Oral Daily Kenny Le, DO          Today, Patient Was Seen By: Valorie Roberts Elmer Pearce MD    **Please Note: This note may have been constructed using a voice recognition system  **

## 2023-06-27 NOTE — PLAN OF CARE
Problem: MOBILITY - ADULT  Goal: Maintain or return to baseline ADL function  Description: INTERVENTIONS:  -  Assess patient's ability to carry out ADLs; assess patient's baseline for ADL function and identify physical deficits which impact ability to perform ADLs (bathing, care of mouth/teeth, toileting, grooming, dressing, etc )  - Assess/evaluate cause of self-care deficits   - Assess range of motion  - Assess patient's mobility; develop plan if impaired  - Assess patient's need for assistive devices and provide as appropriate  - Encourage maximum independence but intervene and supervise when necessary  - Involve family in performance of ADLs  - Assess for home care needs following discharge   - Consider OT consult to assist with ADL evaluation and planning for discharge  - Provide patient education as appropriate  Outcome: Progressing  Goal: Maintains/Returns to pre admission functional level  Description: INTERVENTIONS:  - Perform BMAT or MOVE assessment daily    - Set and communicate daily mobility goal to care team and patient/family/caregiver     - Collaborate with rehabilitation services on mobility goals if consulted  - Stand patient 3 times a day  - Ambulate patient 3 times a day  - Out of bed to chair 2 times a day   - Out of bed for meals 2 times a day  - Out of bed for toileting  - Record patient progress and toleration of activity level   Outcome: Progressing     Problem: PAIN - ADULT  Goal: Verbalizes/displays adequate comfort level or baseline comfort level  Description: Interventions:  - Encourage patient to monitor pain and request assistance  - Assess pain using appropriate pain scale  - Administer analgesics based on type and severity of pain and evaluate response  - Implement non-pharmacological measures as appropriate and evaluate response  - Consider cultural and social influences on pain and pain management  - Notify physician/advanced practitioner if interventions unsuccessful or patient reports new pain  Outcome: Progressing     Problem: DISCHARGE PLANNING  Goal: Discharge to home or other facility with appropriate resources  Description: INTERVENTIONS:  - Identify barriers to discharge w/patient and caregiver  - Arrange for needed discharge resources and transportation as appropriate  - Identify discharge learning needs (meds, wound care, etc )  - Arrange for interpretive services to assist at discharge as needed  - Refer to Case Management Department for coordinating discharge planning if the patient needs post-hospital services based on physician/advanced practitioner order or complex needs related to functional status, cognitive ability, or social support system  Outcome: Progressing     Problem: Knowledge Deficit  Goal: Patient/family/caregiver demonstrates understanding of disease process, treatment plan, medications, and discharge instructions  Description: Complete learning assessment and assess knowledge base    Interventions:  - Provide teaching at level of understanding  - Provide teaching via preferred learning methods  Outcome: Progressing     Problem: Prexisting or High Potential for Compromised Skin Integrity  Goal: Skin integrity is maintained or improved  Description: INTERVENTIONS:  - Identify patients at risk for skin breakdown  - Assess and monitor skin integrity  - Assess and monitor nutrition and hydration status  - Monitor labs   - Assess for incontinence   - Turn and reposition patient  - Assist with mobility/ambulation  - Relieve pressure over bony prominences  - Avoid friction and shearing  - Provide appropriate hygiene as needed including keeping skin clean and dry  - Evaluate need for skin moisturizer/barrier cream  - Collaborate with interdisciplinary team   - Patient/family teaching  - Consider wound care consult   Outcome: Progressing

## 2023-06-27 NOTE — ASSESSMENT & PLAN NOTE
Lab Results   Component Value Date    EGFR 44 06/26/2023    EGFR 68 03/17/2023    EGFR 47 12/19/2022    CREATININE 1 14 06/26/2023    CREATININE 0 80 03/17/2023    CREATININE 1 08 12/19/2022     · Creatinine at baseline    Avoid nephrotoxins

## 2023-06-27 NOTE — H&P
[facility]  H&P  Name: Tori Magaña 79 y o  female I MRN: [id number]  Unit/Bed#: S -01 I Date of Admission: 6/26/2023   Date of Service: 6/26/2023 I Hospital Day: 1      Assessment/Plan   * Orbital mass  Assessment & Plan  · 1 week of intermittent diplopia  Outpatient work-up was unremarkable  Had persistent diplopia today so presented to [facility] ED  · CTA head/neck: There is a mass 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 apex anteriorly into the retrobulbar soft tissues measuring 2 8 cm in long axis and 1 3 cm in short axis with peripheral increased density on noncontrast imaging, suggestive of an orbital venous varix, 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  · Neuro ED discussed with neurosurgery and ophthalmology, who recommended transfer to [facility] for eval   Neurosurgery recommended MRI/MRA brain  · Does have some mild APD on the right pupil on admission  Plan  · MRI/MRA brain and orbits  · Ophthalmology consulted, appreciate recommendations  · Neurosurgery consulted, appreciate recommendations    Chronic diastolic (congestive) heart failure (HCC)  Assessment & Plan  Wt Readings from Last 3 Encounters:   04/13/23 85 1 kg (187 lb 9 6 oz)   03/22/23 83 5 kg (184 lb)   01/20/23 82 8 kg (182 lb 9 6 oz)     · 6/22 TTE with EF 34%, grade 1 diastolic dysfunction  · Uses Lasix as needed at home  · Not in exacerbation  Track I's and O's  Coronary artery disease  Assessment & Plan  Continue aspirin 81 mg, statin    AVB (atrioventricular block)  Assessment & Plan  · History of first-degree AV block  Bradycardic into the 40s on admission  Avoid beta-blockers  · Monitor on telemetry overnight  Stage 3a chronic kidney disease [facility])  Assessment & Plan  Lab Results   Component Value Date    EGFR 44 06/26/2023    EGFR 68 03/17/2023    EGFR 47 12/19/2022    CREATININE 1 14 06/26/2023    CREATININE 0 80 03/17/2023    CREATININE 1 08 12/19/2022     · Creatinine at baseline    Avoid nephrotoxins    Depression, recurrent (HCC)  Assessment & Plan  Continue Zoloft    Gastroesophageal reflux disease without esophagitis  Assessment & Plan  Continue pantoprazole 40 mg daily    JANICE (obstructive sleep apnea)  Assessment & Plan  CPAP at night    Hypertension  Assessment & Plan  Continue ARB    Hypothyroid  Assessment & Plan  Continue levothyroxine 50 mcg daily       VTE Pharmacologic Prophylaxis: VTE Score: 5 High Risk (Score >/= 5) - Pharmacological DVT Prophylaxis Ordered: enoxaparin (Lovenox)  Sequential Compression Devices Ordered  Code Status: Level 3 - DNAR and DNI   Discussion with family: Patient declined call to   Anticipated Length of Stay: Patient will be admitted on an observation basis with an anticipated length of stay of less than 2 midnights secondary to Orbital mass  Chief Complaint: Diplopia    History of Present Illness:  Eduarda Liu is a 79 y o  female with a PMH of stage III CKD, first-degree AV block, HFpEF, CAD who presents with 1 week of diplopia  Reports she started having double vision about 1 week ago  Initially intermittent  She was evaluated outpatient by her eye doctor who could not find any abnormalities  She presented to [facility] ED today after that the diplopia was not resolving like previously  Lab work essentially unremarkable  CTA with findings of a right-sided orbital mass 2 8 cm x 1 3 cm suggestive of a orbital venous varix, possible partially thrombosed  Re[facility] for [facility] for ophtho evaluation  Patient appears comfortable on admission, does complain of some diplopia and a mild frontal headache  The frontal headache is not new and has been an intermittent issue for her over the years  Denies any other neurological symptoms  No shortness of breath, tachycardia, leg swelling      Review of Systems:  Review of Systems    Past Medical and Surgical History:   Past Medical History:   Diagnosis Date   • Anemia 08/22/2018   • Anxiety    • Arthritis    • AVB (atrioventricular block)     first degree   • Cataract    • CHF (congestive heart failure) (HCC)    • COPD, mild (HCC)    • Coronary artery disease    • Dislocation of right shoulder joint    • Frequent UTI    • GERD (gastroesophageal reflux disease)    • H/O: pneumonia    • Heme positive stool    • Hyperlipidemia    • Hypertension    • Hypothyroidism    • Morbid obesity with BMI of 50 0-59 9, adult ([facility] )    • Obesity, morbid ([facility] ) 08/22/2018   • JANICE on CPAP    • Pulmonary hypertension ([facility] ) 08/22/2018   • Severe aortic stenosis    • Simple goiter    • Skin cyst     within the armpits, right   • Wears glasses        Past Surgical History:   Procedure Laterality Date   • BREAST BIOPSY     • CARDIAC CATHETERIZATION     • CARPAL TUNNEL RELEASE Bilateral    • CHOLECYSTECTOMY     • DILATION AND CURETTAGE OF UTERUS     • HYSTEROSCOPY     • MASTOID SURGERY     • CT COLONOSCOPY FLX DX W/COLLJ SPEC WHEN PFRMD N/A 9/6/2018    Procedure: COLONOSCOPY;  Surgeon: Sariah Hidalgo MD;  Location: [facility]; Service: Gastroenterology   • CT ECHO TRANSESOPHAG R-T 2D W/PRB IMG ACQUISJ I&R N/A 10/9/2018    Procedure: INTRA-OP TRANSESOPHAGEAL ECHOCARDIOGRAM (GARRISON); Surgeon: Javi Cordoba DO;  Location: [facility];  Service: Cardiac Surgery   • CT ESOPHAGOGASTRODUODENOSCOPY TRANSORAL DIAGNOSTIC N/A 8/31/2018    Procedure: ESOPHAGOGASTRODUODENOSCOPY (EGD); Surgeon: Sariah Hidalgo MD;  Location: [facility]; Service: Gastroenterology   • CT REPLACE AORTIC VALVE OPENFEMORAL ARTERY APPROACH N/A 10/9/2018    Procedure: REPLACEMENT AORTIC VALVE TRANSCATHETER (TAVR) TRANSFEMORAL W/ 23 MM MENDOZA NOE S3 VALVE (ACCESS OF LEFT); Surgeon: Javi Cordoba DO;  Location: [facility];  Service: Cardiac Surgery   • TOTAL HIP ARTHROPLASTY Left 2007   • TOTAL KNEE ARTHROPLASTY Bilateral        Meds/Allergies:  Prior to Admission medications    Medication Sig Start Date End Date Taking?  Authorizing Provider   acetaminophen (TYLENOL) 500 mg tablet Take 500 mg by mouth every 6 (six) hours as needed    Historical Provider, MD   albuterol (2 5 mg/3 mL) 0 083 % nebulizer solution Take 3 mL (2 5 mg total) by nebulization every 6 (six) hours as needed for wheezing or shortness of breath  Patient taking differently: Take 2 5 mg by nebulization every 6 (six) hours as needed for wheezing or shortness of breath PRN 4/4/22   Rad Nunn PA-C   albuterol (PROVENTIL HFA,VENTOLIN HFA) 90 mcg/act inhaler Inhale 2 puffs every 6 (six) hours as needed for wheezing 6/16/20   MABEL Arroyo   Ascorbic Acid, Vitamin C, (VITAMIN C) 100 MG tablet Take 100 mg by mouth daily    Historical Provider, MD   aspirin (ECOTRIN LOW STRENGTH) 81 mg EC tablet Take 1 tablet (81 mg total) by mouth daily 10/11/18   José Antonio Armenta PA-C   b complex vitamins capsule Take 1 capsule by mouth 2 (two) times a day      Historical Provider, MD   budesonide-formoterol (Symbicort) 160-4 5 mcg/act inhaler Inhale 2 puffs 2 (two) times a day 7/29/22   Rad Nunn PA-C   Calcium Carb-Cholecalciferol (CALCIUM 600 + D PO) Take 1 tablet by mouth 2 (two) times a day    Historical Provider, MD   Calcium Carb-Cholecalciferol 600-10 MG-MCG TABS Take 1 tablet by mouth    Historical Provider, MD   Cranberry 1000 MG CAPS Take by mouth    Historical Provider, MD   Cranberry 250 MG TABS Take by mouth    Historical Provider, MD   Diclofenac Sodium (VOLTAREN) 1 % Apply 2 g topically 4 (four) times a day 8/5/22   Nemo Hartman MD   Fesoterodine Fumarate ER (Toviaz) 8 MG TB24 Take 8 mg by mouth daily     Historical Provider, MD   furosemide (LASIX) 20 mg tablet Take 1 tablet (20 mg total) by mouth daily as needed (wt gain) In the morning 1/16/23 2/15/23  Ned Shirley MD   ibuprofen (MOTRIN) 200 mg tablet Take 200 mg by mouth every 6 (six) hours as needed    Historical Provider, MD   levothyroxine 50 mcg tablet TAKE 1 TABLET BY MOUTH EVERY DAY 5/3/23   Kenna ROUSE MABEL Schrader   olmesartan (BENICAR) 5 mg tablet TAKE 2 TABLETS BY MOUTH EVERY DAY 5/3/23   Jaden Borrero MD   omeprazole (PriLOSEC) 40 MG capsule TAKE 1 CAPSULE BY MOUTH TWICE A DAY 5/3/23   MABEL Hallman   sertraline (ZOLOFT) 100 mg tablet TAKE 1 TABLET BY MOUTH EVERY DAY 4/27/23   MABEL Hallman   simvastatin (ZOCOR) 40 mg tablet TAKE 1 TABLET BY MOUTH EVERYDAY AT BEDTIME 6/23/23   MABEL Schmid   cyclobenzaprine (FLEXERIL) 5 mg tablet Take 1 tablet (5 mg total) by mouth 2 (two) times a day as needed for muscle spasms 8/30/22 9/5/22  MABEL Hallman   meloxicam (Mobic) 15 mg tablet Take 1 tablet (15 mg total) by mouth daily 8/30/22 9/5/22  MABEL Hallman     I have reviewed home medications with patient personally  Allergies: Allergies   Allergen Reactions   • Latex Rash   • Neosporin [Neomycin-Bacitracin Zn-Polymyx] Rash and Other (See Comments)     hives per [facility] order       Social History:  Marital Status: Single   Occupation:   Patient Pre-hospital Living Situation: Home  Patient Pre-hospital Level of Mobility: walks with walker  Patient Pre-hospital Diet Restrictions:   Substance Use History:   Social History     Substance and Sexual Activity   Alcohol Use No     Social History     Tobacco Use   Smoking Status Never   Smokeless Tobacco Never     Social History     Substance and Sexual Activity   Drug Use No       Family History:  Family History   Problem Relation Age of Onset   • Diabetes Mother    • Stroke Mother    • Cancer Father    • Lung cancer Father    • Diabetes Sister    • Heart disease Sister    • Hypertension Sister    • Coronary artery disease Family    • Diabetes Family    • Hypertension Family    • Cancer Family    • Stroke Family    • Thyroid disease Neg Hx        Physical Exam:     Vitals:   Blood Pressure: 147/93 (06/26/23 2128)  Pulse: (!) 51 (06/26/23 2128)  SpO2: 93 % (06/26/23 2128)    Physical Exam  Constitutional:       General: She is not in acute distress  Appearance: She is obese  She is not ill-appearing  Eyes:      General:         Right eye: No discharge  Left eye: No discharge  Extraocular Movements: Extraocular movements intact  Conjunctiva/sclera: Conjunctivae normal       Comments: Mild right APD noted   Cardiovascular:      Rate and Rhythm: Regular rhythm  Bradycardia present  Heart sounds: No murmur heard  Pulmonary:      Effort: Pulmonary effort is normal  No respiratory distress  Breath sounds: Normal breath sounds  Abdominal:      General: Abdomen is flat  There is no distension  Palpations: Abdomen is soft  Musculoskeletal:      Right lower leg: No edema  Left lower leg: No edema  Skin:     General: Skin is warm and dry  Neurological:      Mental Status: She is alert and oriented to person, place, and time  Mental status is at baseline     Psychiatric:         Mood and Affect: Mood normal          Behavior: Behavior normal           Additional Data:     Lab Results:  Results from last 7 days   Lab Units 06/26/23  1338   WBC Thousand/uL 8 20   HEMOGLOBIN g/dL 11 5   HEMATOCRIT % 36 8   PLATELETS Thousands/uL 278   NEUTROS PCT % 54   LYMPHS PCT % 27   MONOS PCT % 10   EOS PCT % 7*     Results from last 7 days   Lab Units 06/26/23  1338   SODIUM mmol/L 138   POTASSIUM mmol/L 4 8   CHLORIDE mmol/L 106   CO2 mmol/L 26   BUN mg/dL 45*   CREATININE mg/dL 1 14   ANION GAP mmol/L 6   CALCIUM mg/dL 10 3*   ALBUMIN g/dL 3 9   TOTAL BILIRUBIN mg/dL 0 46   ALK PHOS U/L 61   ALT U/L 23   AST U/L 27   GLUCOSE RANDOM mg/dL 92     Results from last 7 days   Lab Units 06/26/23  1338   INR  1 05                   Lines/Drains:  Invasive Devices     Peripheral Intravenous Line  Duration           Peripheral IV 06/26/23 Left Antecubital <1 day                    Imaging: Reviewed radiology reports from this admission including: CT head  MRI inpatient order    (Results Pending)       EKG and Other Studies Reviewed on Admission: · EKG: Bradycardia, rate 46     ** Please Note: This note has been constructed using a voice recognition system   **

## 2023-06-28 VITALS
DIASTOLIC BLOOD PRESSURE: 62 MMHG | HEART RATE: 57 BPM | OXYGEN SATURATION: 92 % | TEMPERATURE: 98.2 F | SYSTOLIC BLOOD PRESSURE: 137 MMHG | RESPIRATION RATE: 18 BRPM

## 2023-06-28 LAB
ANION GAP SERPL CALCULATED.3IONS-SCNC: 5 MMOL/L
BASOPHILS # BLD AUTO: 0.06 THOUSANDS/ÂΜL (ref 0–0.1)
BASOPHILS NFR BLD AUTO: 1 % (ref 0–1)
BUN SERPL-MCNC: 25 MG/DL (ref 5–25)
CALCIUM SERPL-MCNC: 9.7 MG/DL (ref 8.4–10.2)
CHLORIDE SERPL-SCNC: 105 MMOL/L (ref 96–108)
CO2 SERPL-SCNC: 28 MMOL/L (ref 21–32)
CREAT SERPL-MCNC: 0.76 MG/DL (ref 0.6–1.3)
EOSINOPHIL # BLD AUTO: 0.53 THOUSAND/ÂΜL (ref 0–0.61)
EOSINOPHIL NFR BLD AUTO: 6 % (ref 0–6)
ERYTHROCYTE [DISTWIDTH] IN BLOOD BY AUTOMATED COUNT: 16.5 % (ref 11.6–15.1)
GFR SERPL CREATININE-BSD FRML MDRD: 72 ML/MIN/1.73SQ M
GLUCOSE SERPL-MCNC: 92 MG/DL (ref 65–140)
HCT VFR BLD AUTO: 39.6 % (ref 34.8–46.1)
HGB BLD-MCNC: 12.1 G/DL (ref 11.5–15.4)
IMM GRANULOCYTES # BLD AUTO: 0.04 THOUSAND/UL (ref 0–0.2)
IMM GRANULOCYTES NFR BLD AUTO: 1 % (ref 0–2)
LYMPHOCYTES # BLD AUTO: 2.23 THOUSANDS/ÂΜL (ref 0.6–4.47)
LYMPHOCYTES NFR BLD AUTO: 26 % (ref 14–44)
MCH RBC QN AUTO: 27.8 PG (ref 26.8–34.3)
MCHC RBC AUTO-ENTMCNC: 30.6 G/DL (ref 31.4–37.4)
MCV RBC AUTO: 91 FL (ref 82–98)
MONOCYTES # BLD AUTO: 0.79 THOUSAND/ÂΜL (ref 0.17–1.22)
MONOCYTES NFR BLD AUTO: 9 % (ref 4–12)
NEUTROPHILS # BLD AUTO: 4.94 THOUSANDS/ÂΜL (ref 1.85–7.62)
NEUTS SEG NFR BLD AUTO: 57 % (ref 43–75)
NRBC BLD AUTO-RTO: 0 /100 WBCS
PLATELET # BLD AUTO: 288 THOUSANDS/UL (ref 149–390)
PMV BLD AUTO: 10.5 FL (ref 8.9–12.7)
POTASSIUM SERPL-SCNC: 4.7 MMOL/L (ref 3.5–5.3)
RBC # BLD AUTO: 4.35 MILLION/UL (ref 3.81–5.12)
SODIUM SERPL-SCNC: 138 MMOL/L (ref 135–147)
TSH SERPL DL<=0.05 MIU/L-ACNC: 2.02 UIU/ML (ref 0.45–4.5)
WBC # BLD AUTO: 8.59 THOUSAND/UL (ref 4.31–10.16)

## 2023-06-28 PROCEDURE — 80048 BASIC METABOLIC PNL TOTAL CA: CPT | Performed by: INTERNAL MEDICINE

## 2023-06-28 PROCEDURE — 97166 OT EVAL MOD COMPLEX 45 MIN: CPT

## 2023-06-28 PROCEDURE — 85025 COMPLETE CBC W/AUTO DIFF WBC: CPT | Performed by: INTERNAL MEDICINE

## 2023-06-28 PROCEDURE — 97116 GAIT TRAINING THERAPY: CPT

## 2023-06-28 PROCEDURE — 97163 PT EVAL HIGH COMPLEX 45 MIN: CPT

## 2023-06-28 PROCEDURE — 84443 ASSAY THYROID STIM HORMONE: CPT

## 2023-06-28 RX ADMIN — ACETAMINOPHEN 975 MG: 325 TABLET ORAL at 05:40

## 2023-06-28 RX ADMIN — LEVOTHYROXINE SODIUM 50 MCG: 50 TABLET ORAL at 05:40

## 2023-06-28 RX ADMIN — PANTOPRAZOLE SODIUM 40 MG: 40 TABLET, DELAYED RELEASE ORAL at 05:40

## 2023-06-28 RX ADMIN — ENOXAPARIN SODIUM 40 MG: 40 INJECTION SUBCUTANEOUS at 11:58

## 2023-06-28 RX ADMIN — ASPIRIN 81 MG: 81 TABLET, COATED ORAL at 08:05

## 2023-06-28 RX ADMIN — OXYBUTYNIN CHLORIDE 10 MG: 5 TABLET, EXTENDED RELEASE ORAL at 08:05

## 2023-06-28 RX ADMIN — SERTRALINE 100 MG: 100 TABLET, FILM COATED ORAL at 08:04

## 2023-06-28 NOTE — PLAN OF CARE
Problem: MOBILITY - ADULT  Goal: Maintain or return to baseline ADL function  Description: INTERVENTIONS:  -  Assess patient's ability to carry out ADLs; assess patient's baseline for ADL function and identify physical deficits which impact ability to perform ADLs (bathing, care of mouth/teeth, toileting, grooming, dressing, etc )  - Assess/evaluate cause of self-care deficits   - Assess range of motion  - Assess patient's mobility; develop plan if impaired  - Assess patient's need for assistive devices and provide as appropriate  - Encourage maximum independence but intervene and supervise when necessary  - Involve family in performance of ADLs  - Assess for home care needs following discharge   - Consider OT consult to assist with ADL evaluation and planning for discharge  - Provide patient education as appropriate  6/28/2023 0424 by Esteban Harris RN  Outcome: Progressing  6/28/2023 0424 by Esteban Harris RN  Outcome: Progressing  Goal: Maintains/Returns to pre admission functional level  Description: INTERVENTIONS:  - Perform BMAT or MOVE assessment daily    - Set and communicate daily mobility goal to care team and patient/family/caregiver  - Collaborate with rehabilitation services on mobility goals if consulted  - Perform Range of Motion  times a day  - Reposition patient every  hours    - Dangle patient  times a day  - Stand patient  times a day  - Ambulate patient  times a day  - Out of bed to chair  times a day   - Out of bed for meals  times a day  - Out of bed for toileting  - Record patient progress and toleration of activity level   6/28/2023 0424 by Esteban Harris RN  Outcome: Progressing  6/28/2023 0424 by Esteban Harris RN  Outcome: Progressing     Problem: PAIN - ADULT  Goal: Verbalizes/displays adequate comfort level or baseline comfort level  Description: Interventions:  - Encourage patient to monitor pain and request assistance  - Assess pain using appropriate pain scale  - Administer analgesics based on type and severity of pain and evaluate response  - Implement non-pharmacological measures as appropriate and evaluate response  - Consider cultural and social influences on pain and pain management  - Notify physician/advanced practitioner if interventions unsuccessful or patient reports new pain  6/28/2023 0424 by Karen Fabian RN  Outcome: Progressing  6/28/2023 0424 by Karen Fabian RN  Outcome: Progressing     Problem: SAFETY ADULT  Goal: Maintain or return to baseline ADL function  Description: INTERVENTIONS:  -  Assess patient's ability to carry out ADLs; assess patient's baseline for ADL function and identify physical deficits which impact ability to perform ADLs (bathing, care of mouth/teeth, toileting, grooming, dressing, etc )  - Assess/evaluate cause of self-care deficits   - Assess range of motion  - Assess patient's mobility; develop plan if impaired  - Assess patient's need for assistive devices and provide as appropriate  - Encourage maximum independence but intervene and supervise when necessary  - Involve family in performance of ADLs  - Assess for home care needs following discharge   - Consider OT consult to assist with ADL evaluation and planning for discharge  - Provide patient education as appropriate  6/28/2023 0424 by Karen Fabian RN  Outcome: Progressing  6/28/2023 0424 by Karen Fabian RN  Outcome: Progressing  Goal: Maintains/Returns to pre admission functional level  Description: INTERVENTIONS:  - Perform BMAT or MOVE assessment daily    - Set and communicate daily mobility goal to care team and patient/family/caregiver  - Collaborate with rehabilitation services on mobility goals if consulted  - Perform Range of Motion  times a day  - Reposition patient every  hours    - Dangle patient  times a day  - Stand patient  times a day  - Ambulate patient  times a day  - Out of bed to chair  times a day   - Out of bed for meals  times a day  - Out of bed for toileting  - Record patient progress and toleration of activity level   6/28/2023 0424 by Andreina Valenzuela RN  Outcome: Progressing  6/28/2023 0424 by Andreina Valenzuela RN  Outcome: Progressing  Goal: Patient will remain free of falls  Description: INTERVENTIONS:  - Educate patient/family on patient safety including physical limitations  - Instruct patient to call for assistance with activity   - Consult OT/PT to assist with strengthening/mobility   - Keep Call bell within reach  - Keep bed low and locked with side rails adjusted as appropriate  - Keep care items and personal belongings within reach  - Initiate and maintain comfort rounds  - Make Fall Risk Sign visible to staff  - Offer Toileting every  Hours, in advance of need  - Initiate/Maintain alarm  - Obtain necessary fall risk management equipment:  - Apply yellow socks and bracelet for high fall risk patients  - Consider moving patient to room near nurses station  6/28/2023 0424 by Andreina Valenzuela RN  Outcome: Progressing  6/28/2023 0424 by Andreina Valenzuela RN  Outcome: Progressing     Problem: DISCHARGE PLANNING  Goal: Discharge to home or other facility with appropriate resources  Description: INTERVENTIONS:  - Identify barriers to discharge w/patient and caregiver  - Arrange for needed discharge resources and transportation as appropriate  - Identify discharge learning needs (meds, wound care, etc )  - Arrange for interpretive services to assist at discharge as needed  - Refer to Case Management Department for coordinating discharge planning if the patient needs post-hospital services based on physician/advanced practitioner order or complex needs related to functional status, cognitive ability, or social support system  6/28/2023 0424 by Andreina Valenzuela RN  Outcome: Progressing  6/28/2023 0424 by Andreina Valenzuela RN  Outcome: Progressing     Problem: Knowledge Deficit  Goal: Patient/family/caregiver demonstrates understanding of disease process, treatment plan, medications, and discharge instructions  Description: Complete learning assessment and assess knowledge base    Interventions:  - Provide teaching at level of understanding  - Provide teaching via preferred learning methods  6/28/2023 0424 by Prudence Amado RN  Outcome: Progressing  6/28/2023 0424 by Prudence Amado RN  Outcome: Progressing     Problem: Prexisting or High Potential for Compromised Skin Integrity  Goal: Skin integrity is maintained or improved  Description: INTERVENTIONS:  - Identify patients at risk for skin breakdown  - Assess and monitor skin integrity  - Assess and monitor nutrition and hydration status  - Monitor labs   - Assess for incontinence   - Turn and reposition patient  - Assist with mobility/ambulation  - Relieve pressure over bony prominences  - Avoid friction and shearing  - Provide appropriate hygiene as needed including keeping skin clean and dry  - Evaluate need for skin moisturizer/barrier cream  - Collaborate with interdisciplinary team   - Patient/family teaching  - Consider wound care consult   6/28/2023 0424 by Prudence Amado RN  Outcome: Progressing  6/28/2023 0424 by Prudence Amado RN  Outcome: Progressing

## 2023-06-28 NOTE — PLAN OF CARE
Problem: MOBILITY - ADULT  Goal: Maintain or return to baseline ADL function  Description: INTERVENTIONS:  -  Assess patient's ability to carry out ADLs; assess patient's baseline for ADL function and identify physical deficits which impact ability to perform ADLs (bathing, care of mouth/teeth, toileting, grooming, dressing, etc )  - Assess/evaluate cause of self-care deficits   - Assess range of motion  - Assess patient's mobility; develop plan if impaired  - Assess patient's need for assistive devices and provide as appropriate  - Encourage maximum independence but intervene and supervise when necessary  - Involve family in performance of ADLs  - Assess for home care needs following discharge   - Consider OT consult to assist with ADL evaluation and planning for discharge  - Provide patient education as appropriate  Outcome: Progressing  Goal: Maintains/Returns to pre admission functional level  Description: INTERVENTIONS:  - Perform BMAT or MOVE assessment daily    - Set and communicate daily mobility goal to care team and patient/family/caregiver     - Collaborate with rehabilitation services on mobility goals if consulted  - Out of bed for toileting  - Record patient progress and toleration of activity level   Outcome: Progressing     Problem: PAIN - ADULT  Goal: Verbalizes/displays adequate comfort level or baseline comfort level  Description: Interventions:  - Encourage patient to monitor pain and request assistance  - Assess pain using appropriate pain scale  - Administer analgesics based on type and severity of pain and evaluate response  - Implement non-pharmacological measures as appropriate and evaluate response  - Consider cultural and social influences on pain and pain management  - Notify physician/advanced practitioner if interventions unsuccessful or patient reports new pain  Outcome: Progressing     Problem: SAFETY ADULT  Goal: Maintain or return to baseline ADL function  Description: INTERVENTIONS:  -  Assess patient's ability to carry out ADLs; assess patient's baseline for ADL function and identify physical deficits which impact ability to perform ADLs (bathing, care of mouth/teeth, toileting, grooming, dressing, etc )  - Assess/evaluate cause of self-care deficits   - Assess range of motion  - Assess patient's mobility; develop plan if impaired  - Assess patient's need for assistive devices and provide as appropriate  - Encourage maximum independence but intervene and supervise when necessary  - Involve family in performance of ADLs  - Assess for home care needs following discharge   - Consider OT consult to assist with ADL evaluation and planning for discharge  - Provide patient education as appropriate  Outcome: Progressing  Goal: Maintains/Returns to pre admission functional level  Description: INTERVENTIONS:  - Perform BMAT or MOVE assessment daily    - Set and communicate daily mobility goal to care team and patient/family/caregiver     - Collaborate with rehabilitation services on mobility goals if consulted  - Out of bed for toileting  - Record patient progress and toleration of activity level   Outcome: Progressing  Goal: Patient will remain free of falls  Description: INTERVENTIONS:  - Educate patient/family on patient safety including physical limitations  - Instruct patient to call for assistance with activity   - Consult OT/PT to assist with strengthening/mobility   - Keep Call bell within reach  - Keep bed low and locked with side rails adjusted as appropriate  - Keep care items and personal belongings within reach  - Initiate and maintain comfort rounds  - Make Fall Risk Sign visible to staff  - Apply yellow socks and bracelet for high fall risk patients  - Consider moving patient to room near nurses station  Outcome: Progressing     Problem: DISCHARGE PLANNING  Goal: Discharge to home or other facility with appropriate resources  Description: INTERVENTIONS:  - Identify barriers to discharge w/patient and caregiver  - Arrange for needed discharge resources and transportation as appropriate  - Identify discharge learning needs (meds, wound care, etc )  - Arrange for interpretive services to assist at discharge as needed  - Refer to Case Management Department for coordinating discharge planning if the patient needs post-hospital services based on physician/advanced practitioner order or complex needs related to functional status, cognitive ability, or social support system  Outcome: Progressing     Problem: Knowledge Deficit  Goal: Patient/family/caregiver demonstrates understanding of disease process, treatment plan, medications, and discharge instructions  Description: Complete learning assessment and assess knowledge base    Interventions:  - Provide teaching at level of understanding  - Provide teaching via preferred learning methods  Outcome: Progressing     Problem: Prexisting or High Potential for Compromised Skin Integrity  Goal: Skin integrity is maintained or improved  Description: INTERVENTIONS:  - Identify patients at risk for skin breakdown  - Assess and monitor skin integrity  - Assess and monitor nutrition and hydration status  - Monitor labs   - Assess for incontinence   - Turn and reposition patient  - Assist with mobility/ambulation  - Relieve pressure over bony prominences  - Avoid friction and shearing  - Provide appropriate hygiene as needed including keeping skin clean and dry  - Evaluate need for skin moisturizer/barrier cream  - Collaborate with interdisciplinary team   - Patient/family teaching  - Consider wound care consult   Outcome: Progressing

## 2023-06-28 NOTE — OCCUPATIONAL THERAPY NOTE
Occupational Therapy Evaluation     Patient Name: Miko ELLIOTT'S Date: 6/28/2023  Problem List  Principal Problem:    Orbital mass  Active Problems:    Hypothyroid    Hypertension    JANICE (obstructive sleep apnea)    Chronic diastolic (congestive) heart failure (HCC)    Gastroesophageal reflux disease without esophagitis    AVB (atrioventricular block)    Coronary artery disease    Depression, recurrent (HCC)    Stage 3a chronic kidney disease ([facility] [address] )    Past Medical History  Past Medical History:   Diagnosis Date   • Anemia 08/22/2018   • Anxiety    • Arthritis    • AVB (atrioventricular block)     first degree   • Cataract    • CHF (congestive heart failure) (HCC)    • COPD, mild (HCC)    • Coronary artery disease    • Dislocation of right shoulder joint    • Frequent UTI    • GERD (gastroesophageal reflux disease)    • H/O: pneumonia    • Heme positive stool    • Hyperlipidemia    • Hypertension    • Hypothyroidism    • Morbid obesity with BMI of 50 0-59 9, adult ([facility] )    • Obesity, morbid ([facility] ) 08/22/2018   • JANICE on CPAP    • Pulmonary hypertension ([place] [address] ) 08/22/2018   • Severe aortic stenosis    • Simple goiter    • Skin cyst     within the armpits, right   • Wears glasses      Past Surgical History  Past Surgical History:   Procedure Laterality Date   • BREAST BIOPSY     • CARDIAC CATHETERIZATION     • CARPAL TUNNEL RELEASE Bilateral    • CHOLECYSTECTOMY     • DILATION AND CURETTAGE OF UTERUS     • HYSTEROSCOPY     • MASTOID SURGERY     • PA COLONOSCOPY FLX DX W/COLLJ SPEC WHEN PFRMD N/A 9/6/2018    Procedure: COLONOSCOPY;  Surgeon: Rakesh Mcintyre MD;  Location: [facility]; Service: Gastroenterology   • PA ECHO TRANSESOPHAG R-T 2D W/PRB IMG ACQUISJ I&R N/A 10/9/2018    Procedure: INTRA-OP TRANSESOPHAGEAL ECHOCARDIOGRAM (GARRISON);   Surgeon: Travis Cox DO;  Location:  MAIN OR;  Service: Cardiac Surgery   • PA ESOPHAGOGASTRODUODENOSCOPY TRANSORAL DIAGNOSTIC N/A 8/31/2018    Procedure: ESOPHAGOGASTRODUODENOSCOPY (EGD); Surgeon: Terry Aviles MD;  Location: [facility]; Service: Gastroenterology   • NM REPLACE AORTIC VALVE OPENFEMORAL ARTERY APPROACH N/A 10/9/2018    Procedure: REPLACEMENT AORTIC VALVE TRANSCATHETER (TAVR) TRANSFEMORAL W/ 23 MM MENDOZA NOE S3 VALVE (ACCESS OF LEFT); Surgeon: Blanche Kirkland DO;  Location:  MAIN OR;  Service: Cardiac Surgery   • TOTAL HIP ARTHROPLASTY Left 2007   • TOTAL KNEE ARTHROPLASTY Bilateral          06/28/23 1344   OT Last Visit   OT Visit Date 06/28/23   Note Type   Note type Evaluation   Pain Assessment   Pain Assessment Tool 0-10   Pain Score No Pain   Restrictions/Precautions   Weight Bearing Precautions Per Order No   Other Precautions Chair Alarm; Bed Alarm;Multiple lines; Fall Risk   Home Living   Type of [address] One level;Performs ADLs on one level; Able to live on main level with bedroom/bathroom; Ramped entrance   Bathroom Shower/Tub Walk-in shower   [facility]  chair   Bathroom Accessibility Accessible   Home Equipment Walker  (rollator)   Prior Function   Level of Ray Independent with ADLs; Independent with functional mobility; Independent with IADLS   Lives With Alone  (Dog)   Receives Help From Friend(s)  (friend from [facility])   IADLs Independent with driving; Independent with meal prep; Independent with medication management   Falls in the last 6 months 1 to 4  (2 per pt)   Vocational Retired   Comments Pt reports (I) with ADLs/IADLs PTA, rollator for mobility  Friend from [facility] can assist as needed     Lifestyle   Autonomy Pt (I) with ADLs/IADLs PTA living alone in a 1 level house, RW vs rollator, (+) falls, (-)    Reciprocal Relationships Supportive friend from [facility] (pastors wife)   Service to Others Retired   [address] Enjoys going to [employer]   Additional Pertinent History Per imaging: Orbital mass, right eye, 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 "apex anteriorly into the retrobulbar soft tissue about 2 8 cm x 1 3 cm   Family/Caregiver Present No   Subjective   Subjective \"Oh I don't drive if my vision is like that\"   ADL   Eating Assistance 7  Independent   Eating Deficit Setup; Beverage management   Grooming Assistance 5  Supervision/Setup   Grooming Deficit Setup; Increased time to complete;Standing with assistive device; Teeth care;Brushing hair  (standing at sink in bathroom ')   UB Bathing Assistance 6  Modified Independent   LB Bathing Assistance 5  Supervision/Setup   UB Dressing Assistance 6  Modified independent   LB Dressing Assistance 5  Supervision/Setup   Toileting Assistance  5  Supervision/Setup   Bed Mobility   Additional Comments Pt received in recliner upon arrival   Transfers   Sit to Stand 5  Supervision   Additional items Armrests; Increased time required   Stand to Sit 5  Supervision   Additional items Armrests; Increased time required   Functional Mobility   Functional Mobility 5  Supervision   Additional Comments Short household distance to bathroom and back with RW with (S)  Additional items Rolling walker   Balance   Static Sitting Good   Dynamic Sitting Fair +   Static Standing Fair +   Dynamic Standing Fair   Activity Tolerance   Activity Tolerance Patient limited by fatigue   Nurse Made Aware yes, RN Nolberto ok to see pt   RUE Assessment   RUE Assessment X  (limited shoulder AROM)   LUE Assessment   LUE Assessment WFL   Hand Function   Gross Motor Coordination Functional   Fine Motor Coordination Functional   Hand Function Comments difficulty reaching top of head BUEs   Sensation   Light Touch No apparent deficits   Vision-Basic Assessment   Current Vision Wears glasses all the time   Vision - Complex Assessment   Ocular Range of Motion Intact   Tracking Intact   Visual Fields   [facility])   Acuity Able to read clock/calendar on wall without difficulty; Able to read employee name badge without difficulty   Additional Comments Pt denies visual " deficits at this time  Was admitted with blurry vision/double vision   Cognition   Overall Cognitive Status WFL   Arousal/Participation Alert; Cooperative   Attention Within functional limits   Orientation Level Oriented X4   Memory Within functional limits   Following Commands Follows one step commands without difficulty   Comments Pleasant and agreeable, questionable insight into deficits, good safety awareness  Able to sequence basic ADLs/problem solve  Assessment   Limitation Decreased high-level ADLs  (GM coord, fxnl reach and standing cherri, response time)   Prognosis Good   Assessment Pt is a 79 y o  female seen for OT evaluation s/p admit to [address] [place] [address] on 6/26/2023 w/Orbital mass  Prior to admission, pt was living alone in a 1 level house, (I) with ADLs, (I) with IADLs, (+) falls, (+)   Personal and environmental factors affecting patient at time of evaluation include advanced age, limited social support and difficulty completing IADLs  Personal factors supporting patient at time of evaluation include (I) PLOF, attitude towards recovery and accessible home environment  Based upon this evaluation, pt is functioning at baseline  No further acute OT needs identified at this time  Recommend continued active ADL participation and mobilization with hospital staff while in the hospital to increase pt’s endurance and strength upon D/C  From OT standpoint, recommend D/C to home with social support when medically cleared  D/C pt from OT caseload at this time  Goals   Patient Goals to go home   Plan   Treatment Interventions   (Eval only)   Recommendation   OT Discharge Recommendation No rehabilitation needs   Additional Comments  The patient's raw score on the AM-PAC Daily Activity inpatient short form is 23, standardized score is 51 12, greater than 39 4  From an OT standpoint, patients at this level may benefit from d/c to No rehabilitation needs     AM-PAC Daily Activity Inpatient   Lower Body Dressing 4   Bathing 4   Toileting 4   Upper Body Dressing 4   Grooming 3   Eating 4   Daily Activity Raw Score 23   Daily Activity Standardized Score (Calc for Raw Score >=11) 51 12   AM-PAC Applied Cognition Inpatient   Following a Speech/Presentation 4   Understanding Ordinary Conversation 4   Taking Medications 3   Remembering Where Things Are Placed or Put Away 4   Remembering List of 4-5 Errands 3   Taking Care of Complicated Tasks 3   Applied Cognition Raw Score 21   Applied Cognition Standardized Score 44 3   End of Consult   Patient Position at End of Consult Bedside chair;Bed/Chair alarm activated; All needs within reach   Nurse Communication Nurse aware of consult     JANET Gomez/L  [place] License [id number]  [id number] [facility] [id number]

## 2023-06-28 NOTE — ASSESSMENT & PLAN NOTE
Lab Results   Component Value Date    EGFR 72 06/28/2023    EGFR 57 06/27/2023    EGFR 44 06/26/2023    CREATININE 0 76 06/28/2023    CREATININE 0 92 06/27/2023    CREATININE 1 14 06/26/2023     · Cr BL 0 8-1 4  · POA 1 14  · Avoid nephrotoxins

## 2023-06-28 NOTE — PLAN OF CARE
Problem: PHYSICAL THERAPY ADULT  Goal: Performs mobility at highest level of function for planned discharge setting  See evaluation for individualized goals  Description: Treatment/Interventions: Functional transfer training, LE strengthening/ROM, Therapeutic exercise, Endurance training, Patient/family training, Equipment eval/education, Gait training, Bed mobility          See flowsheet documentation for full assessment, interventions and recommendations  Note:    Problem List: Decreased strength, Decreased range of motion, Decreased endurance, Impaired balance, Decreased mobility, Decreased safety awareness, Pain, Obesity (R LE edema)  Assessment: Pt started having double vision about 1 week ago  Initially intermittent  Dx: right orbital mass, CKD, AVB, and HTN  order placed for PT eval and tx, w/ activity order of up and out of bed as tolerated  pt presents w/ comorbidities of CKD, AV block, heart failure, anemia, arthritis, COPD, right shoulder dislocation, UTI, pneumonia, hyperlipidemia, morbid obesity, aortic stenosis w/ TAVR, left LETITIA, bilateral TKA, and CAD and personal factors of advanced age, mobilizing w/ assistive device, limited home support, positive fall history, anxiety, hearing impairments and depression  pt presents w/ pain, weakness, decreased ROM, decreased endurance, impaired balance, gait deviations, impaired hearing, decreased safety awareness, fall risk and LE edema  these impairments are evident in findings from physical examination (weakness, decreased ROM and edema of extremities), mobility assessment (need for standby assist w/ all phases of mobility when usually mobilizing independently, tolerance to only 80 feet of ambulation and need for cueing for mobility technique), and Barthel Index: 65/100  pt needed input for  mobility technique  pt is at risk for falls due to physical and safety awareness deficits   pt's clinical presentation is unstable/unpredictable (evident in bradycardia, poor blood pressure control, need for assist w/ all phases of mobility when usually mobilizing independently, tolerance to only 80 feet of ambulation, pain impacting overall mobility status and need for input for mobility technique/safety)  pt needs inpatient PT tx to improve mobility deficits and progress mobility training as appropriate  discharge recommendation is for outpatient PT to reduce fall risk and maximize level of functional independence  pt would benefit from OT consult to address safety awareness and ability to drive  PT Discharge Recommendation: Home with outpatient rehabilitation    See flowsheet documentation for full assessment

## 2023-06-28 NOTE — DISCHARGE INSTR - AVS FIRST PAGE
Dear Patience Fox,     It was our pleasure to care for you here at [facility], [address]  It is our hope that we were always able to exceed the expected standards for your care during your stay  You were hospitalized due to double vision  You were cared for on the 3rd floor by Avila Jacobson MD under the service of Milton Cheatham MD with the [facility] who covers for your primary care physician (PCP), MABEL Rudd, while you were hospitalized  If you have any questions or concerns related to this hospitalization, you may contact us at [phone]  For follow up as well as any medication refills, we recommend that you follow up with your primary care physician  A registered nurse will reach out to you by phone within a few days after your discharge to answer any additional questions that you may have after going home  However, at this time we provide for you here, the most important instructions / recommendations at discharge:     Notable Medication Adjustments -   None  Testing Required after Discharge -   None  Important follow up information -   Please follow up with your primary care physician within one week of discharge  Please do not drive until cleared by your primary care physician  Please follow up with ophthalmology   Other Instructions -   Please get and stay well  It was an honor and privilege to be part of your health care team  You were a pleasure to speak with each morning and great to see you have such a compassionate friend to call upon when Marylene Kim, MD  Please review this entire after visit summary as additional general instructions including medication list, appointments, activity, diet, any pertinent wound care, and other additional recommendations from your care team that may be provided for you        Sincerely,     Avila Jacobson MD

## 2023-06-28 NOTE — PLAN OF CARE
Problem: MOBILITY - ADULT  Goal: Maintain or return to baseline ADL function  Description: INTERVENTIONS:  -  Assess patient's ability to carry out ADLs; assess patient's baseline for ADL function and identify physical deficits which impact ability to perform ADLs (bathing, care of mouth/teeth, toileting, grooming, dressing, etc )  - Assess/evaluate cause of self-care deficits   - Assess range of motion  - Assess patient's mobility; develop plan if impaired  - Assess patient's need for assistive devices and provide as appropriate  - Encourage maximum independence but intervene and supervise when necessary  - Involve family in performance of ADLs  - Assess for home care needs following discharge   - Consider OT consult to assist with ADL evaluation and planning for discharge  - Provide patient education as appropriate  6/28/2023 1358 by Jeet Lee RN  Outcome: Adequate for Discharge  6/28/2023 0809 by Jeet Lee RN  Outcome: Progressing  Goal: Maintains/Returns to pre admission functional level  Description: INTERVENTIONS:  - Perform BMAT or MOVE assessment daily    - Set and communicate daily mobility goal to care team and patient/family/caregiver     - Collaborate with rehabilitation services on mobility goals if consulted  - Out of bed for toileting  - Record patient progress and toleration of activity level   6/28/2023 1358 by Jeet Lee RN  Outcome: Adequate for Discharge  6/28/2023 0809 by Jeet Lee RN  Outcome: Progressing     Problem: PAIN - ADULT  Goal: Verbalizes/displays adequate comfort level or baseline comfort level  Description: Interventions:  - Encourage patient to monitor pain and request assistance  - Assess pain using appropriate pain scale  - Administer analgesics based on type and severity of pain and evaluate response  - Implement non-pharmacological measures as appropriate and evaluate response  - Consider cultural and social influences on pain and pain management  - Notify physician/advanced practitioner if interventions unsuccessful or patient reports new pain  6/28/2023 1358 by Manuel Johnston RN  Outcome: Adequate for Discharge  6/28/2023 0809 by Manuel Johnston RN  Outcome: Progressing     Problem: SAFETY ADULT  Goal: Maintain or return to baseline ADL function  Description: INTERVENTIONS:  -  Assess patient's ability to carry out ADLs; assess patient's baseline for ADL function and identify physical deficits which impact ability to perform ADLs (bathing, care of mouth/teeth, toileting, grooming, dressing, etc )  - Assess/evaluate cause of self-care deficits   - Assess range of motion  - Assess patient's mobility; develop plan if impaired  - Assess patient's need for assistive devices and provide as appropriate  - Encourage maximum independence but intervene and supervise when necessary  - Involve family in performance of ADLs  - Assess for home care needs following discharge   - Consider OT consult to assist with ADL evaluation and planning for discharge  - Provide patient education as appropriate  6/28/2023 1358 by Manuel Johnston RN  Outcome: Adequate for Discharge  6/28/2023 0809 by Manuel Johnston RN  Outcome: Progressing  Goal: Maintains/Returns to pre admission functional level  Description: INTERVENTIONS:  - Perform BMAT or MOVE assessment daily    - Set and communicate daily mobility goal to care team and patient/family/caregiver     - Collaborate with rehabilitation services on mobility goals if consulted  - Out of bed for toileting  - Record patient progress and toleration of activity level   6/28/2023 1358 by Manuel Johnston RN  Outcome: Adequate for Discharge  6/28/2023 0809 by Manuel Johnston RN  Outcome: Progressing  Goal: Patient will remain free of falls  Description: INTERVENTIONS:  - Educate patient/family on patient safety including physical limitations  - Instruct patient to call for assistance with activity   - Consult OT/PT to assist with strengthening/mobility   - Keep Call bell within reach  - Keep bed low and locked with side rails adjusted as appropriate  - Keep care items and personal belongings within reach  - Apply yellow socks and bracelet for high fall risk patients  - Consider moving patient to room near nurses station  6/28/2023 1358 by Hank Roberts RN  Outcome: Adequate for Discharge  6/28/2023 0809 by Hank Roberts RN  Outcome: Progressing     Problem: DISCHARGE PLANNING  Goal: Discharge to home or other facility with appropriate resources  Description: INTERVENTIONS:  - Identify barriers to discharge w/patient and caregiver  - Arrange for needed discharge resources and transportation as appropriate  - Identify discharge learning needs (meds, wound care, etc )  - Arrange for interpretive services to assist at discharge as needed  - Refer to Case Management Department for coordinating discharge planning if the patient needs post-hospital services based on physician/advanced practitioner order or complex needs related to functional status, cognitive ability, or social support system  6/28/2023 1358 by Hank Roberts RN  Outcome: Adequate for Discharge  6/28/2023 0809 by Hank Roberts RN  Outcome: Progressing     Problem: Knowledge Deficit  Goal: Patient/family/caregiver demonstrates understanding of disease process, treatment plan, medications, and discharge instructions  Description: Complete learning assessment and assess knowledge base    Interventions:  - Provide teaching at level of understanding  - Provide teaching via preferred learning methods  6/28/2023 1358 by Hank Roberts RN  Outcome: Adequate for Discharge  6/28/2023 0809 by Hank Roberts RN  Outcome: Progressing     Problem: Prexisting or High Potential for Compromised Skin Integrity  Goal: Skin integrity is maintained or improved  Description: INTERVENTIONS:  - Identify patients at risk for skin breakdown  - Assess and monitor skin integrity  - Assess and monitor nutrition and hydration status  - Monitor labs   - Assess for incontinence   - Turn and reposition patient  - Assist with mobility/ambulation  - Relieve pressure over bony prominences  - Avoid friction and shearing  - Provide appropriate hygiene as needed including keeping skin clean and dry  - Evaluate need for skin moisturizer/barrier cream  - Collaborate with interdisciplinary team   - Patient/family teaching  - Consider wound care consult   6/28/2023 1358 by Angie Servin RN  Outcome: Adequate for Discharge  6/28/2023 0809 by Angie Servin RN  Outcome: Progressing

## 2023-06-28 NOTE — PHYSICAL THERAPY NOTE
PHYSICAL THERAPY EVALUATION NOTE    Patient Name: Kumar Hu  AOUNW'U Date: 6/28/2023  AGE:   79 y o  Mrn:   [id number]  ADMIT DX:  No admission diagnoses are documented for this encounter  Past Medical History:   Diagnosis Date    Anemia 08/22/2018    Anxiety     Arthritis     AVB (atrioventricular block)     first degree    Cataract     CHF (congestive heart failure) (HCC)     COPD, mild (HCC)     Coronary artery disease     Dislocation of right shoulder joint     Frequent UTI     GERD (gastroesophageal reflux disease)     H/O: pneumonia     Heme positive stool     Hyperlipidemia     Hypertension     Hypothyroidism     Morbid obesity with BMI of 50 0-59 9, adult ([facility] )     Obesity, morbid ([facility] ) 08/22/2018    JANICE on CPAP     Pulmonary hypertension ([place] [address] ) 08/22/2018    Severe aortic stenosis     Simple goiter     Skin cyst     within the armpits, right    Wears glasses      Length Of Stay: 2  PHYSICAL THERAPY EVALUATION :   06/28/23 1005   PT Last Visit   PT Visit Date 06/28/23   Pain Assessment   Pain Assessment Tool 0-10   Pain Score 6   Pain Location/Orientation Location: Back; Other (Comment)  (pt had a headache earlier but resolved after receiving pain medication)   Restrictions/Precautions   Other Precautions Chair Alarm; Bed Alarm; Fall Risk;Pain;Hard of hearing  ([place])   Home Living   Type of [address] [place] [address]e One level;Ramped entrance   Additional Comments lives alone  friend is able to assist if needed  ambulates w/ rollator in home and roller walker in community  independent w/ ADLs and IADLs  2 falls in last 6 months  General   Additional Pertinent History 6/28/23 at 7:23 heart rate was 44 BPM  6/28/23 at 18:30 blood pressure was 181/78  Family/Caregiver Present No   Cognition   Arousal/Participation Alert   Orientation Level Oriented to person; Other (Comment)  (pt was identified w/ full name, birth date) Following Commands Follows one step commands with increased time or repetition   Comments room air resting pulse ox 93% and 48 BPM, active 89% and 60 BPM  Nolberto AGUDELO was notified   (moderate edema right calf)   Subjective   Subjective pt seen sitting out of bed  agreed to PT eval  states having back pain  RUE Assessment   RUE Assessment X  (limited shoulder ROM, otherwise WFL)   LUE Assessment   LUE Assessment X  (limited shoulder ROM, otherwise WFL)   RLE Assessment   RLE Assessment WFL  (3+ to 4-/5)   LLE Assessment   LLE Assessment WFL  (3+ to 4-/5)   Light Touch   RLE Light Touch Grossly intact   LLE Light Touch Grossly intact   Transfers   Sit to Stand 5  Supervision   Additional items Increased time required   Stand to Sit 5  Supervision   Additional items Increased time required;Verbal cues  (for body positioning)   Ambulation/Elevation   Gait pattern Foward flexed; Short stride; Excessively slow   Gait Assistance 5  Supervision   Additional items Verbal cues  (for walker positioning, breathing technique)   Assistive Device Rolling walker   Distance 30 feet  seated rest break  80 feet   (additional ambulation not possible due to fatigue, dyspnea )   Balance   Static Sitting Good   Dynamic Sitting Fair   Static Standing Fair -  (w/ roller walker)   Ambulatory Fair -  (w/ roller walker)   Activity Tolerance   Activity Tolerance Patient limited by fatigue   Nurse Made Aware spoke to Nolberto AGUDELO, Kylah Núñez CM   Assessment   Problem List Decreased strength;Decreased range of motion;Decreased endurance; Impaired balance;Decreased mobility; Decreased safety awareness;Pain;Obesity  (R LE edema)   Assessment Pt started having double vision about 1 week ago  Initially intermittent  Dx: right orbital mass, CKD, AVB, and HTN  order placed for PT eval and tx, w/ activity order of up and out of bed as tolerated   pt presents w/ comorbidities of CKD, AV block, heart failure, anemia, arthritis, COPD, right shoulder dislocation, UTI, pneumonia, hyperlipidemia, morbid obesity, aortic stenosis w/ TAVR, left LETITIA, bilateral TKA, and CAD and personal factors of advanced age, mobilizing w/ assistive device, limited home support, positive fall history, anxiety, hearing impairments and depression  pt presents w/ pain, weakness, decreased ROM, decreased endurance, impaired balance, gait deviations, impaired hearing, decreased safety awareness, fall risk and LE edema  these impairments are evident in findings from physical examination (weakness, decreased ROM and edema of extremities), mobility assessment (need for standby assist w/ all phases of mobility when usually mobilizing independently, tolerance to only 80 feet of ambulation and need for cueing for mobility technique), and Barthel Index: 65/100  pt needed input for  mobility technique  pt is at risk for falls due to physical and safety awareness deficits  pt's clinical presentation is unstable/unpredictable (evident in bradycardia, poor blood pressure control, need for assist w/ all phases of mobility when usually mobilizing independently, tolerance to only 80 feet of ambulation, pain impacting overall mobility status and need for input for mobility technique/safety)  pt needs inpatient PT tx to improve mobility deficits and progress mobility training as appropriate  discharge recommendation is for outpatient PT to reduce fall risk and maximize level of functional independence  pt would benefit from OT consult to address safety awareness and ability to drive  Goals   Patient Goals go home  be able to drive to the doctor and the store  STG Expiration Date 07/08/23   Short Term Goal #1 pt will:  Increase bilateral LE strength 1/2 grade to facilitate independent mobility, Perform bed mobility independently to increase level of independence, Perform all transfers independently to improve independence, Ambulate 250 ft  with roller walker independently w/o LOB to improve functional independence, Increase ambulatory balance 1 grade to decrease risk for falls, Complete exercise program independently to increase strength and endurance, Tolerate 3 hr OOB to faciliate upright tolerance, Improve gait speed to 0 53 m/s to reduce fall risk and increase independence and Improve Barthel Index score to 85 or greater to facilitate independence   PT Treatment Day 1   Plan   Treatment/Interventions Functional transfer training;LE strengthening/ROM; Therapeutic exercise; Endurance training;Patient/family training;Equipment eval/education;Gait training;Bed mobility   PT Frequency 3-5x/wk   Recommendation   PT Discharge Recommendation Home with outpatient rehabilitation   Additional Comments (S)  pt would benefit from OT consult to address safety awareness and ability to drive  AM-PAC Basic Mobility Inpatient   Turning in Flat Bed Without Bedrails 4   Lying on Back to Sitting on Edge of Flat Bed Without Bedrails 3   Moving Bed to Chair 3   Standing Up From Chair Using Arms 3   Walk in Room 3   Climb 3-5 Stairs With Railing 2   Basic Mobility Inpatient Raw Score 18   Basic Mobility Standardized Score 41 05   Highest Level Of Mobility   JH-HLM Goal 6: Walk 10 steps or more   JH-HLM Achieved 7: Walk 25 feet or more   Barthel Index   Feeding 10   Bathing 0   Grooming Score 5   Dressing Score 5   Bladder Score 5   Bowels Score 10   Toilet Use Score 10   Transfers (Bed/Chair) Score 10   Mobility (Level Surface) Score 10   Stairs Score 0   Barthel Index Score 65   Additional Treatment Session   Start Time 1005   End Time 1015   Treatment Assessment Pt agreed to participate in additional ambulation to address physical and mobility deficits noted during eval  Sit < - > stand transfer w/ supervision  Ambulated 140 feet w/ roller walker w/ supervision  Additional ambulation was not possible due to fatigue and dyspnea  Comfortable Gait Speed: 0 43 m/s   Therapist provided education to pt including walker management and transfer technique  Pt shows improvement w/ increased activity tolerance but continued to need the same level of assistance  Further inpatient PT intervention is necessary to decrease fall risk and maximize functional independence  Equipment Use roller walker   Additional Treatment Day 1   End of Consult   Patient Position at End of Consult Bedside chair;Bed/Chair alarm activated; All needs within reach     Comfortable Gait Speed: 0 43 m/s  Gait Speed Interpretation:  Gain of 0 1 m/s is a predictor of well-being in those w/ abnormal walking speed compared to age-patched peers  <0 7m/s is at an increased risk for falls    Household ambulator: <0 4 m/s  Limited community ambulator: 0 4-0 8 m/s  Community ambulator: 0 8-1 2 m/s  Able to safely cross streets: >1 2 m/s     The patient's AM-PAC Basic Mobility Inpatient Short Form Raw Score is 18  A Raw score of greater than 16 suggests the patient may benefit from discharge to home  Please also refer to the recommendation of the Physical Therapist for safe discharge planning  Skilled PT recommended while in hospital and upon DC to progress pt toward treatment goals       Irma Santiago, PT

## 2023-06-28 NOTE — PLAN OF CARE
Problem: MOBILITY - ADULT  Goal: Maintain or return to baseline ADL function  Description: INTERVENTIONS:  -  Assess patient's ability to carry out ADLs; assess patient's baseline for ADL function and identify physical deficits which impact ability to perform ADLs (bathing, care of mouth/teeth, toileting, grooming, dressing, etc )  - Assess/evaluate cause of self-care deficits   - Assess range of motion  - Assess patient's mobility; develop plan if impaired  - Assess patient's need for assistive devices and provide as appropriate  - Encourage maximum independence but intervene and supervise when necessary  - Involve family in performance of ADLs  - Assess for home care needs following discharge   - Consider OT consult to assist with ADL evaluation and planning for discharge  - Provide patient education as appropriate  Outcome: Progressing  Goal: Maintains/Returns to pre admission functional level  Description: INTERVENTIONS:  - Perform BMAT or MOVE assessment daily    - Set and communicate daily mobility goal to care team and patient/family/caregiver  - Collaborate with rehabilitation services on mobility goals if consulted  - Perform Range of Motion  times a day  - Reposition patient every  hours    - Dangle patient  times a day  - Stand patient  times a day  - Ambulate patient times a day  - Out of bed to chair  times a day   - Out of bed for meals  times a day  - Out of bed for toileting  - Record patient progress and toleration of activity level   Outcome: Progressing     Problem: PAIN - ADULT  Goal: Verbalizes/displays adequate comfort level or baseline comfort level  Description: Interventions:  - Encourage patient to monitor pain and request assistance  - Assess pain using appropriate pain scale  - Administer analgesics based on type and severity of pain and evaluate response  - Implement non-pharmacological measures as appropriate and evaluate response  - Consider cultural and social influences on pain and pain management  - Notify physician/advanced practitioner if interventions unsuccessful or patient reports new pain  Outcome: Progressing     Problem: SAFETY ADULT  Goal: Maintain or return to baseline ADL function  Description: INTERVENTIONS:  -  Assess patient's ability to carry out ADLs; assess patient's baseline for ADL function and identify physical deficits which impact ability to perform ADLs (bathing, care of mouth/teeth, toileting, grooming, dressing, etc )  - Assess/evaluate cause of self-care deficits   - Assess range of motion  - Assess patient's mobility; develop plan if impaired  - Assess patient's need for assistive devices and provide as appropriate  - Encourage maximum independence but intervene and supervise when necessary  - Involve family in performance of ADLs  - Assess for home care needs following discharge   - Consider OT consult to assist with ADL evaluation and planning for discharge  - Provide patient education as appropriate  Outcome: Progressing  Goal: Maintains/Returns to pre admission functional level  Description: INTERVENTIONS:  - Perform BMAT or MOVE assessment daily    - Set and communicate daily mobility goal to care team and patient/family/caregiver  - Collaborate with rehabilitation services on mobility goals if consulted  - Perform Range of Motion  times a day  - Reposition patient every  hours    - Dangle patient  times a day  - Stand patient  times a day  - Ambulate patient  times a day  - Out of bed to chair  times a day   - Out of bed for meals  times a day  - Out of bed for toileting  - Record patient progress and toleration of activity level   Outcome: Progressing  Goal: Patient will remain free of falls  Description: INTERVENTIONS:  - Educate patient/family on patient safety including physical limitations  - Instruct patient to call for assistance with activity   - Consult OT/PT to assist with strengthening/mobility   - Keep Call bell within reach  - Keep bed low and locked with side rails adjusted as appropriate  - Keep care items and personal belongings within reach  - Initiate and maintain comfort rounds  - Make Fall Risk Sign visible to staff  - Offer Toileting every  Hours, in advance of need  - Initiate/Maintain alarm  - Obtain necessary fall risk management equipment:  - Apply yellow socks and bracelet for high fall risk patients  - Consider moving patient to room near nurses station  Outcome: Progressing     Problem: DISCHARGE PLANNING  Goal: Discharge to home or other facility with appropriate resources  Description: INTERVENTIONS:  - Identify barriers to discharge w/patient and caregiver  - Arrange for needed discharge resources and transportation as appropriate  - Identify discharge learning needs (meds, wound care, etc )  - Arrange for interpretive services to assist at discharge as needed  - Refer to Case Management Department for coordinating discharge planning if the patient needs post-hospital services based on physician/advanced practitioner order or complex needs related to functional status, cognitive ability, or social support system  Outcome: Progressing     Problem: Knowledge Deficit  Goal: Patient/family/caregiver demonstrates understanding of disease process, treatment plan, medications, and discharge instructions  Description: Complete learning assessment and assess knowledge base    Interventions:  - Provide teaching at level of understanding  - Provide teaching via preferred learning methods  Outcome: Progressing     Problem: Prexisting or High Potential for Compromised Skin Integrity  Goal: Skin integrity is maintained or improved  Description: INTERVENTIONS:  - Identify patients at risk for skin breakdown  - Assess and monitor skin integrity  - Assess and monitor nutrition and hydration status  - Monitor labs   - Assess for incontinence   - Turn and reposition patient  - Assist with mobility/ambulation  - Relieve pressure over bony prominences  - Avoid friction and shearing  - Provide appropriate hygiene as needed including keeping skin clean and dry  - Evaluate need for skin moisturizer/barrier cream  - Collaborate with interdisciplinary team   - Patient/family teaching  - Consider wound care consult   Outcome: Progressing

## 2023-06-28 NOTE — ASSESSMENT & PLAN NOTE
Wt Readings from Last 3 Encounters:   04/13/23 85 1 kg (187 lb 9 6 oz)   03/22/23 83 5 kg (184 lb)   01/20/23 82 8 kg (182 lb 9 6 oz)     · 6/22 TTE with EF 92%, grade 1 diastolic dysfunction  · Uses Lasix as needed at home  · Not in exacerbation

## 2023-06-28 NOTE — ASSESSMENT & PLAN NOTE
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  MRI brain and orbits wo and w contrast [id number]  · No acute intracranial pathology  · Mild chronic microangiopathy  · Elongated ovoid lesion within the right orbit extending towards the orbital apex favored represent thrombosed venous varix  Mass effect and displacement of the medial and inferior rectus muscles  · No mass effect on the optic nerve  MRA head wo contrast [id number]  · No intracranial stenosis, large vessel occlusion, aneurysm or AVM  Plan  · Ophthalmology consulted  · Orbital varix right eye recommend pt to consider transfer to tertiary care center if the patient is agreeable to pursuing a definitive diagnosis and management options

## 2023-06-29 ENCOUNTER — TRANSITIONAL CARE MANAGEMENT (OUTPATIENT)
Dept: FAMILY MEDICINE CLINIC | Facility: CLINIC | Age: 83
End: 2023-06-29

## 2023-06-30 DIAGNOSIS — Z71.89 COMPLEX CARE COORDINATION: Primary | ICD-10-CM

## 2023-07-06 ENCOUNTER — RA CDI HCC (OUTPATIENT)
Dept: OTHER | Facility: HOSPITAL | Age: 83
End: 2023-07-06

## 2023-07-06 ENCOUNTER — PATIENT OUTREACH (OUTPATIENT)
Dept: FAMILY MEDICINE CLINIC | Facility: CLINIC | Age: 83
End: 2023-07-06

## 2023-07-06 NOTE — PROGRESS NOTES
[address] [facility] coding opportunities          Chart Reviewed number of suggestions sent to Provider: 1   I13.0    Patients Insurance     Medicare Insurance: [employer]

## 2023-07-06 NOTE — PROGRESS NOTES
Complex Care Management Note:  Inbasket notification for complex outpatient care management received as identified via HRR report. Chart review completed. Case does not meet criteria. Referral closed.

## 2023-07-11 ENCOUNTER — OFFICE VISIT (OUTPATIENT)
Dept: FAMILY MEDICINE CLINIC | Facility: CLINIC | Age: 83
End: 2023-07-11
Payer: MEDICARE

## 2023-07-11 ENCOUNTER — APPOINTMENT (OUTPATIENT)
Dept: LAB | Facility: HOSPITAL | Age: 83
End: 2023-07-11
Payer: MEDICARE

## 2023-07-11 VITALS
DIASTOLIC BLOOD PRESSURE: 80 MMHG | OXYGEN SATURATION: 93 % | BODY MASS INDEX: 43.6 KG/M2 | HEIGHT: 55 IN | SYSTOLIC BLOOD PRESSURE: 130 MMHG | HEART RATE: 59 BPM

## 2023-07-11 DIAGNOSIS — H05.89 ORBITAL MASS: Primary | ICD-10-CM

## 2023-07-11 DIAGNOSIS — Z76.89 ENCOUNTER FOR SUPPORT AND COORDINATION OF TRANSITION OF CARE: ICD-10-CM

## 2023-07-11 DIAGNOSIS — H60.92 OTITIS EXTERNA OF LEFT EAR, UNSPECIFIED CHRONICITY, UNSPECIFIED TYPE: ICD-10-CM

## 2023-07-11 DIAGNOSIS — R73.03 PREDIABETES: ICD-10-CM

## 2023-07-11 DIAGNOSIS — E03.9 ACQUIRED HYPOTHYROIDISM: ICD-10-CM

## 2023-07-11 DIAGNOSIS — E78.5 HYPERLIPIDEMIA, UNSPECIFIED HYPERLIPIDEMIA TYPE: ICD-10-CM

## 2023-07-11 DIAGNOSIS — N39.0 URINARY TRACT INFECTION WITHOUT HEMATURIA, SITE UNSPECIFIED: ICD-10-CM

## 2023-07-11 LAB
ALBUMIN SERPL BCP-MCNC: 4.2 G/DL (ref 3.5–5)
ALP SERPL-CCNC: 73 U/L (ref 34–104)
ALT SERPL W P-5'-P-CCNC: 18 U/L (ref 7–52)
ANION GAP SERPL CALCULATED.3IONS-SCNC: 6 MMOL/L
AST SERPL W P-5'-P-CCNC: 20 U/L (ref 13–39)
BACTERIA UR QL AUTO: ABNORMAL /HPF
BASOPHILS # BLD AUTO: 0.06 THOUSANDS/ÂΜL (ref 0–0.1)
BASOPHILS NFR BLD AUTO: 1 % (ref 0–1)
BILIRUB SERPL-MCNC: 0.46 MG/DL (ref 0.2–1)
BILIRUB UR QL STRIP: NEGATIVE
BUN SERPL-MCNC: 29 MG/DL (ref 5–25)
CALCIUM SERPL-MCNC: 10.7 MG/DL (ref 8.4–10.2)
CHLORIDE SERPL-SCNC: 107 MMOL/L (ref 96–108)
CHOLEST SERPL-MCNC: 145 MG/DL
CLARITY UR: CLEAR
CO2 SERPL-SCNC: 26 MMOL/L (ref 21–32)
COLOR UR: ABNORMAL
CREAT SERPL-MCNC: 1.13 MG/DL (ref 0.6–1.3)
EOSINOPHIL # BLD AUTO: 0.56 THOUSAND/ÂΜL (ref 0–0.61)
EOSINOPHIL NFR BLD AUTO: 7 % (ref 0–6)
ERYTHROCYTE [DISTWIDTH] IN BLOOD BY AUTOMATED COUNT: 15.9 % (ref 11.6–15.1)
GFR SERPL CREATININE-BSD FRML MDRD: 45 ML/MIN/1.73SQ M
GLUCOSE P FAST SERPL-MCNC: 106 MG/DL (ref 65–99)
GLUCOSE UR STRIP-MCNC: NEGATIVE MG/DL
HCT VFR BLD AUTO: 38.3 % (ref 34.8–46.1)
HDLC SERPL-MCNC: 67 MG/DL
HGB BLD-MCNC: 11.9 G/DL (ref 11.5–15.4)
HGB UR QL STRIP.AUTO: NEGATIVE
HYALINE CASTS #/AREA URNS LPF: ABNORMAL /LPF
IMM GRANULOCYTES # BLD AUTO: 0.03 THOUSAND/UL (ref 0–0.2)
IMM GRANULOCYTES NFR BLD AUTO: 0 % (ref 0–2)
KETONES UR STRIP-MCNC: NEGATIVE MG/DL
LDLC SERPL CALC-MCNC: 54 MG/DL (ref 0–100)
LEUKOCYTE ESTERASE UR QL STRIP: ABNORMAL
LYMPHOCYTES # BLD AUTO: 2.04 THOUSANDS/ÂΜL (ref 0.6–4.47)
LYMPHOCYTES NFR BLD AUTO: 25 % (ref 14–44)
MCH RBC QN AUTO: 28.1 PG (ref 26.8–34.3)
MCHC RBC AUTO-ENTMCNC: 31.1 G/DL (ref 31.4–37.4)
MCV RBC AUTO: 91 FL (ref 82–98)
MONOCYTES # BLD AUTO: 0.59 THOUSAND/ÂΜL (ref 0.17–1.22)
MONOCYTES NFR BLD AUTO: 7 % (ref 4–12)
NEUTROPHILS # BLD AUTO: 5.06 THOUSANDS/ÂΜL (ref 1.85–7.62)
NEUTS SEG NFR BLD AUTO: 60 % (ref 43–75)
NITRITE UR QL STRIP: NEGATIVE
NON-SQ EPI CELLS URNS QL MICRO: ABNORMAL /HPF
NONHDLC SERPL-MCNC: 78 MG/DL
NRBC BLD AUTO-RTO: 0 /100 WBCS
PH UR STRIP.AUTO: 5.5 [PH]
PLATELET # BLD AUTO: 384 THOUSANDS/UL (ref 149–390)
PMV BLD AUTO: 10.1 FL (ref 8.9–12.7)
POTASSIUM SERPL-SCNC: 4.8 MMOL/L (ref 3.5–5.3)
PROT SERPL-MCNC: 7.5 G/DL (ref 6.4–8.4)
PROT UR STRIP-MCNC: NEGATIVE MG/DL
RBC # BLD AUTO: 4.23 MILLION/UL (ref 3.81–5.12)
RBC #/AREA URNS AUTO: ABNORMAL /HPF
SODIUM SERPL-SCNC: 139 MMOL/L (ref 135–147)
SP GR UR STRIP.AUTO: 1.01 (ref 1–1.03)
TRIGL SERPL-MCNC: 118 MG/DL
TSH SERPL DL<=0.05 MIU/L-ACNC: 1.24 UIU/ML (ref 0.45–4.5)
UROBILINOGEN UR STRIP-ACNC: <2 MG/DL
WBC # BLD AUTO: 8.34 THOUSAND/UL (ref 4.31–10.16)
WBC #/AREA URNS AUTO: ABNORMAL /HPF

## 2023-07-11 PROCEDURE — 87086 URINE CULTURE/COLONY COUNT: CPT

## 2023-07-11 PROCEDURE — 87186 SC STD MICRODIL/AGAR DIL: CPT

## 2023-07-11 PROCEDURE — 87077 CULTURE AEROBIC IDENTIFY: CPT

## 2023-07-11 PROCEDURE — 85025 COMPLETE CBC W/AUTO DIFF WBC: CPT

## 2023-07-11 PROCEDURE — 84443 ASSAY THYROID STIM HORMONE: CPT

## 2023-07-11 PROCEDURE — 80061 LIPID PANEL: CPT

## 2023-07-11 PROCEDURE — 99495 TRANSJ CARE MGMT MOD F2F 14D: CPT | Performed by: NURSE PRACTITIONER

## 2023-07-11 PROCEDURE — 36415 COLL VENOUS BLD VENIPUNCTURE: CPT

## 2023-07-11 PROCEDURE — 83036 HEMOGLOBIN GLYCOSYLATED A1C: CPT

## 2023-07-11 PROCEDURE — 80053 COMPREHEN METABOLIC PANEL: CPT

## 2023-07-11 PROCEDURE — 81001 URINALYSIS AUTO W/SCOPE: CPT

## 2023-07-11 NOTE — PROGRESS NOTES
Assessment/Plan:     Chronic Problems:  Orbital mass  Patient is to follow-up with specialist in [place]. Patient states she is asymptomatic. Visit Diagnosis:  Diagnoses and all orders for this visit:    Orbital mass    Otitis externa of left ear, unspecified chronicity, unspecified type  -     ciprofloxacin (CILOXAN) 0.3 % ophthalmic ointment; Administer into the left eye 3 (three) times a day    Encounter for support and coordination of transition of care        Subjective:     Patient ID: Kwadwo Goode is a 79 y.o. female. Patient presents for transition of care management. Patient was hospitalized from June 26 to the 28th at [facility] for orbital mass. MRI: Elongated ovoid lesion within the right orbit extending towards the orbital apex favored represent thrombosed venous varix. Patient was told by her ophthalmologist that she is to follow-up with a specialist in [place]. Yolande Pollard states that she has not been called by this office yet. She is feeling well and denies any visual disturbance. Patient had originally presented with diplopia which is now evident. Patient is concerned with left ear pain. Patient is post mastoidectomy complete removal of glomus tympanicum from 2019.         Active Problems    Patient Active Problem List   Diagnosis   • Hypothyroid   • Hypertension   • Hyperlipidemia   • Reactive airway disease without complication   • JANICE (obstructive sleep apnea)   • LVH (left ventricular hypertrophy)   • Mitral annular calcification   • Mitral valve stenosis   • Pulmonary hypertension (HCC)   • Chronic diastolic (congestive) heart failure (HCC)   • Anemia   • Obesity, morbid (HCC)   • Gastroesophageal reflux disease without esophagitis   • Arthritis   • AVB (atrioventricular block)   • COPD, mild (HCC)   • Coronary artery disease   • JANICE on CPAP   • S/P TAVR (transcatheter aortic valve replacement)   • Depression with anxiety   • Insomnia   • Osteopenia   • Depression, recurrent [facility])   • Radiculopathy, lumbar region   • 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• Stage 3a chronic kidney disease ([address])   • Urinary incontinence   • Orbital mass       Past Medical History     Past Medical History:   Diagnosis Date   • Anemia 08/22/2018   • Anxiety    • Arthritis    • AVB (atrioventricular block)     first degree   • Cataract    • CHF (congestive heart failure) (HCC)    • COPD, mild (HCC)    • Coronary artery disease    • Dislocation of right shoulder joint    • Frequent UTI    • GERD (gastroesophageal reflux disease)    • H/O: pneumonia    • Heme positive stool    • Hyperlipidemia    • Hypertension    • Hypothyroidism    • Morbid obesity with BMI of 50.0-59.9, adult ([address])    • Obesity, morbid ([address]) 08/22/2018   • JANICE on CPAP    • Pulmonary hypertension ([address]) 08/22/2018   • Severe aortic stenosis    • Simple goiter    • Skin cyst     within the armpits, right   • Wears glasses        Surgical History    Past Surgical History:   Procedure Laterality Date   • BREAST BIOPSY     • CARDIAC CATHETERIZATION     • CARPAL TUNNEL RELEASE Bilateral    • CHOLECYSTECTOMY     • DILATION AND CURETTAGE OF UTERUS     • HYSTEROSCOPY     • MASTOID SURGERY     • MO COLONOSCOPY FLX DX W/COLLJ SPEC WHEN PFRMD N/A 9/6/2018    Procedure: COLONOSCOPY;  Surgeon: Timur Luong MD;  Location: [facility]; Service: Gastroenterology   • MO ECHO TRANSESOPHAG R-T 2D W/PRB IMG ACQUISJ I&R N/A 10/9/2018    Procedure: INTRA-OP TRANSESOPHAGEAL ECHOCARDIOGRAM (GARRISON); Surgeon: Vesta Cast DO;  Location: [facility];  Service: Cardiac Surgery   • MO ESOPHAGOGASTRODUODENOSCOPY TRANSORAL DIAGNOSTIC N/A 8/31/2018    Procedure: ESOPHAGOGASTRODUODENOSCOPY (EGD); Surgeon: Timur Luong MD;  Location: [facility];   Service: Gastroenterology   • MO REPLACE AORTIC VALVE OPENFEMORAL ARTERY APPROACH N/A 10/9/2018    Procedure: REPLACEMENT AORTIC VALVE TRANSCATHETER (TAVR) TRANSFEMORAL W/ 23 MM MENDOZA NOE S3 VALVE (ACCESS OF LEFT); Surgeon: Daryl Christine DO;  Location: [facility];  Service: Cardiac Surgery   • TOTAL HIP ARTHROPLASTY Left 2007   • TOTAL KNEE ARTHROPLASTY Bilateral        Current Meds       Current Outpatient Medications:   •  acetaminophen (TYLENOL) 500 mg tablet, Take 500 mg by mouth every 6 (six) hours as needed, Disp: , Rfl:   •  albuterol (2.5 mg/3 mL) 0.083 % nebulizer solution, Take 3 mL (2.5 mg total) by nebulization every 6 (six) hours as needed for wheezing or shortness of breath (Patient taking differently: Take 2.5 mg by nebulization every 6 (six) hours as needed for wheezing or shortness of breath PRN), Disp: 360 mL, Rfl: 5  •  albuterol (PROVENTIL HFA,VENTOLIN HFA) 90 mcg/act inhaler, Inhale 2 puffs every 6 (six) hours as needed for wheezing, Disp: 1 Inhaler, Rfl: 3  •  Ascorbic Acid, Vitamin C, (VITAMIN C) 100 MG tablet, Take 100 mg by mouth daily, Disp: , Rfl:   •  aspirin (ECOTRIN LOW STRENGTH) 81 mg EC tablet, Take 1 tablet (81 mg total) by mouth daily, Disp: 100 tablet, Rfl: 0  •  b complex vitamins capsule, Take 1 capsule by mouth 2 (two) times a day  , Disp: , Rfl:   •  budesonide-formoterol (Symbicort) 160-4.5 mcg/act inhaler, Inhale 2 puffs 2 (two) times a day, Disp: 10.2 g, Rfl: 3  •  Calcium Carb-Cholecalciferol (CALCIUM 600 + D PO), Take 1 tablet by mouth 2 (two) times a day, Disp: , Rfl:   •  Calcium Carb-Cholecalciferol 600-10 MG-MCG TABS, Take 1 tablet by mouth, Disp: , Rfl:   •  ciprofloxacin (CILOXAN) 0.3 % ophthalmic ointment, Administer into the left eye 3 (three) times a day, Disp: 3.5 g, Rfl: 0  •  Cranberry 1000 MG CAPS, Take by mouth, Disp: , Rfl:   •  Cranberry 250 MG TABS, Take by mouth, Disp: , Rfl:   •  Fesoterodine Fumarate ER (Toviaz) 8 MG TB24, Take 8 mg by mouth daily , Disp: , Rfl:   •  ibuprofen (MOTRIN) 200 mg tablet, Take 200 mg by mouth every 6 (six) hours as needed, Disp: , Rfl:   •  levothyroxine 50 mcg tablet, TAKE 1 TABLET BY MOUTH EVERY DAY, Disp: 90 tablet, Rfl: 1  • olmesartan (BENICAR) 5 mg tablet, TAKE 2 TABLETS BY MOUTH EVERY DAY, Disp: 180 tablet, Rfl: 3  •  omeprazole (PriLOSEC) 40 MG capsule, TAKE 1 CAPSULE BY MOUTH TWICE A DAY, Disp: 180 capsule, Rfl: 1  •  sertraline (ZOLOFT) 100 mg tablet, TAKE 1 TABLET BY MOUTH EVERY DAY, Disp: 90 tablet, Rfl: 0  •  simvastatin (ZOCOR) 40 mg tablet, TAKE 1 TABLET BY MOUTH EVERYDAY AT BEDTIME, Disp: 90 tablet, Rfl: 1  •  furosemide (LASIX) 20 mg tablet, Take 1 tablet (20 mg total) by mouth daily as needed (wt gain) In the morning, Disp: 90 tablet, Rfl: 3    Allergies    Allergies   Allergen Reactions   • Latex Rash   • Neosporin [Neomycin-Bacitracin Zn-Polymyx] Rash and Other (See Comments)     hives per [facility] order       No images are attached to the encounter.     Health Management    Health Maintenance   Topic Date Due   • COVID-19 Vaccine (3 - Pfizer series) 06/02/2021   • Influenza Vaccine (1) 09/01/2023   • Medicare Annual Wellness Visit (AWV)  09/15/2023   • BMI: Followup Plan  09/15/2023   • Fall Risk  09/27/2023   • Urinary Incontinence Screening  09/15/2023   • Depression Remission PHQ  09/22/2023   • BMI: Adult  04/13/2024   • Osteoporosis Screening  Completed   • Pneumococcal Vaccine: 65+ Years  Completed   • HIB Vaccine  Aged Out   • IPV Vaccine  Aged Out   • Hepatitis A Vaccine  Aged Out   • Meningococcal ACWY Vaccine  Aged Out   • HPV Vaccine  Aged Out       CBC:   Results from last 6 Months   Lab Units 07/11/23  0908   WBC Thousand/uL 8.34   RBC Million/uL 4.23   HEMOGLOBIN g/dL 11.9   HEMATOCRIT % 38.3   MCV fL 91   MCH pg 28.1   MCHC g/dL 31.1*   RDW % 15.9*   MPV fL 10.1   PLATELETS Thousands/uL 384   NRBC AUTO /100 WBCs 0   NEUTROS PCT % 60   LYMPHS PCT % 25   MONOS PCT % 7   EOS PCT % 7*   BASOS PCT % 1   NEUTROS ABS Thousands/µL 5.06   LYMPHS ABS Thousands/µL 2.04   MONOS ABS Thousand/µL 0.59   EOS ABS Thousand/µL 0.56     Chemistry Profile:   Results from last 6 Months   Lab Units 07/11/23  0908   POTASSIUM mmol/L 4. 8   CHLORIDE mmol/L 107   CO2 mmol/L 26   BUN mg/dL 29*   CREATININE mg/dL 1.13   GLUCOSE FASTING mg/dL 106*   CALCIUM mg/dL 10.7*   AST U/L 20   ALT U/L 18   ALK PHOS U/L 73   EGFR ml/min/1.73sq m 45     Coagulation Studies:   Results from last 6 Months   Lab Units 06/26/23  2225 06/26/23  1338   PROTIME seconds 13.6 13.5   INR  1.02 1.05   PTT seconds  --  30     Endocrine Studies:   Results from last 6 Months   Lab Units 07/11/23  0908 06/28/23  0545 03/17/23  0907   HEMOGLOBIN A1C %  --   --  5.9*   TSH 3RD GENERATON uIU/mL 1.235   < >  --     < > = values in this interval not displayed. Imaging: MRI brain and orbits wo and w contrast    Result Date: 6/28/2023  Narrative: MRI OF THE BRAIN AND ORBITS - WITH AND WITHOUT CONTRAST INDICATION: orbital mass. COMPARISON: CT/CTA performed on 6/26/2023. TECHNIQUE: Multiplanar, multisequence imaging of the brain and orbits was performed before and after gadolinium administration. IV Contrast:  8 mL of Gadobutrol injection (SINGLE-DOSE) IMAGE QUALITY:  Diagnostic. FINDINGS: BRAIN PARENCHYMA:  There is no discrete mass, mass effect or midline shift. Brainstem and cerebellum demonstrate normal signal. There is no intracranial hemorrhage. There is no evidence of acute infarction and diffusion imaging is unremarkable. Small scattered hyperintensities on T2/FLAIR imaging are noted in the periventricular and subcortical white matter demonstrating an appearance that is statistically most likely to represent mild microangiopathic change. Normal postcontrast imaging. ORBITS: Redemonstration of elongated ovoid well-circumscribed lesion in the inferomedial right orbit measuring 2.8 x 1.3 x 1.2 cm that extends posteriorly to the orbital apex. There appears to arise in the extraconal space. There is mass effect on the medial rectus muscle which is displaced superiorly and inferior rectus muscle which is displaced laterally.  Superomedial aspect comes in close proximity but does not contact the optic nerve. Lesion is mostly T1/T2 hyperintense. No definite enhancement but evaluation is limited due to T1 hyperintensity. Again favor  thrombosed varix. No other mass lesion identified. No inflammatory changes. Extraocular muscles are otherwise unremarkable. Bilateral lens replacement. Globes are otherwise unremarkable. Theodor Points Optic nerves, optic chiasm and optic tracts are normal in size and signal intensity and without abnormal enhancement. Cavernous sinuses enhance normally. VENTRICLES:  Normal for the patient's age. SELLA AND PITUITARY GLAND:  Normal PARANASAL SINUSES:  Normal. VASCULATURE:  Evaluation of the major intracranial vasculature demonstrates appropriate flow voids. CALVARIUM AND SKULL BASE:  Normal. EXTRACRANIAL SOFT TISSUES:  Normal.     Impression: No acute intracranial pathology. Mild chronic microangiopathy. Elongated ovoid lesion within the right orbit extending towards the orbital apex favored represent thrombosed venous varix. Mass effect and displacement of the medial and inferior rectus muscles. No mass effect on the optic nerve. Workstation performed: [id number]     MRA head wo contrast    Result Date: 6/28/2023  Narrative: MRA BRAIN WITHOUT CONTRAST INDICATION:  orbital mass COMPARISON: Concurrent MRI brain/orbits and CTA from previous day. TECHNIQUE:  Axial 3-D time-of-flight imaging with 3-D reconstructions performed without contrast. IV Contrast:  Not administered. FINDINGS: IMAGE QUALITY:  Diagnostic. ANATOMY INTERNAL CAROTID ARTERIES:  Normal flow related signal of the distal cervical, petrous and cavernous segments of the internal carotid arteries. Normal ICA terminus. ANTERIOR CIRCULATION:  Normal A1 segments. Normal anterior communicating artery. Normal flow-related signal of the anterior cerebral arteries.  MIDDLE CEREBRAL ARTERY CIRCULATION:  The M1 segment and middle cerebral artery branches demonstrate normal flow-related signal. DISTAL VERTEBRAL ARTERIES: Distal vertebral arteries are patent with a normal vertebrobasilar junction. The posterior inferior cerebellar artery origins are normal. BASILAR ARTERY:  Normal. POSTERIOR CEREBRAL ARTERIES: Fetal origin of the right posterior cerebral artery. Left PCA arises from the basilar tip with patent left posterior communicating artery. No significant stenosis. Other: Hyperintensity within the right orbital lesion described on concurrent MRI is likely due to intrinsic T1 hyperintensity of subacute blood products and not flow related enhancement. Impression: No intracranial stenosis, large vessel occlusion, aneurysm or AVM. Workstation performed: [id number]     CTA head and neck with and without contrast    Result Date: 6/26/2023  Narrative: CTA NECK AND BRAIN WITH AND WITHOUT CONTRAST INDICATION: diplopia COMPARISON:   4/19/2019 TECHNIQUE:  Routine CT imaging of the Brain without contrast.  Post contrast imaging was performed after administration of iodinated contrast through the neck and brain. Post contrast axial 0.625 mm images timed to opacify the arterial system. 3D rendering was performed on an independent workstation. MIP reconstructions performed. Coronal reconstructions were performed of the noncontrast portion of the brain. Radiation dose length product (DLP) for this visit:  1320 mGy-cm . This examination, like all CT scans performed in the [facility], was performed utilizing techniques to minimize radiation dose exposure, including the use of iterative reconstruction and automated exposure control. IV Contrast:  100 mL of iohexol (OMNIPAQUE) IMAGE QUALITY:   Diagnostic FINDINGS: NONCONTRAST BRAIN PARENCHYMA:  No intracranial mass, mass effect or midline shift. No CT signs of acute infarction. No acute parenchymal hemorrhage. VENTRICLES AND EXTRA-AXIAL SPACES:  Normal for the patient's age.  VISUALIZED ORBITS: There is an elongated heterogeneous mass identified on noncontrast imaging with some peripheral hyperdensity. This measures 2.8 cm in long axis 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 apex anteriorly into the intraconal/extraconal space. Findings may be secondary to venous varix of the orbit, 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. Differential considerations would include cavernous venous malformation or orbital lymphangioma. This was not apparent on prior CT scan from 2021. PARANASAL SINUSES: Normal visualized paranasal sinuses. CERVICAL VASCULATURE AORTIC ARCH AND GREAT VESSELS:  Normal aortic arch and great vessel origins. Normal visualized subclavian vessels. RIGHT VERTEBRAL ARTERY CERVICAL SEGMENT:  Normal origin. The vessel is normal in caliber throughout the neck. LEFT VERTEBRAL ARTERY CERVICAL SEGMENT:  Normal origin. The vessel is normal in caliber throughout the neck. RIGHT EXTRACRANIAL CAROTID SEGMENT:  Minimal calcification without stenosis or irregularity of the distal common carotid artery or proximal cervical internal carotid artery. There is tortuosity present with medial deviation of the internal carotid artery  into the retropharyngeal space. LEFT EXTRACRANIAL CAROTID SEGMENT: Minimal calcification without stenosis or irregularity of the distal common carotid artery or proximal cervical internal carotid artery. There is tortuosity present with medial deviation of the internal carotid artery into the retropharyngeal space. NASCET criteria was used to determine the degree of internal carotid artery diameter stenosis. INTRACRANIAL VASCULATURE INTERNAL CAROTID ARTERIES:  Normal enhancement of the intracranial portions of the internal carotid arteries. Normal ophthalmic artery origins. Normal ICA terminus. ANTERIOR CIRCULATION:  Symmetric A1 segments and anterior cerebral arteries with normal enhancement. Normal anterior communicating artery. MIDDLE CEREBRAL ARTERY CIRCULATION:  M1 segment and middle cerebral artery branches demonstrate normal enhancement bilaterally.  DISTAL VERTEBRAL ARTERIES: Normal distal vertebral arteries. Posterior inferior cerebellar artery origins are normal. Normal vertebral basilar junction. BASILAR ARTERY:  Basilar artery is normal in caliber. Normal superior cerebellar arteries. POSTERIOR CEREBRAL ARTERIES: There is fetal origin of the right posterior cerebral artery. The left posterior cerebral artery arises from the basilar tip. Both demonstrate no focal stenosis. VENOUS STRUCTURES: Normal dural sinuses and jugular veins. NON VASCULAR ANATOMY BONY STRUCTURES: Diffuse osteopenia of the bony structures. Anterior subluxation of C3 upon C4. Fusion of the C4-5 endplates and posterior elements. Multilevel cervical spondylitic degenerative change. SOFT TISSUES OF THE NECK: The thyroid gland is enlarged and heterogeneous with multiple nodules present, the largest of which is a low density nodule within the anterior lateral aspect of the right lobe measuring 1.6 cm in maximum diameter. Incidental discovery of one or more thyroid nodule(s) measuring more than 1.5 cm and without suspicious features is noted in this patient who is above 28years old; according to guidelines published in the February 2015 white paper on incidental thyroid nodules in the Journal of the [employer] of Radiology [facility]), further characterization with thyroid ultrasound is recommended. THORACIC INLET:  Normal.     Impression: CT brain: 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. There is a mass 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 apex anteriorly into the retrobulbar soft tissues measuring 2.8 cm in long axis and 1.3 cm in short axis with peripheral increased density on noncontrast imaging, suggestive of an orbital venous varix, 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. See above discussion of additional differential considerations. This is new compared to CT scan from 2021 and likely accounts for patient's diplopia. Surgical follow-up recommended.  CT angiography: Unremarkable cervical and intracranial vasculature. Other findings: Incidental thyroid nodule(s) for which nonemergent thyroid ultrasound is recommended. This examination was marked "immediate notification" in Epic in order to begin the standard process by which the radiology reading room liaison alerts the referring practitioner. Workstation performed: [id number]         Review of Systems   Constitutional: Negative for chills, diaphoresis and fever. HENT: Positive for ear pain (left). Respiratory: Negative. Cardiovascular: Negative. Gastrointestinal: Negative. Genitourinary: Positive for frequency and urgency. Negative for dysuria and flank pain. Neurological: Negative for dizziness, light-headedness and headaches. Objective:     Physical Exam  Constitutional:       Appearance: She is well-developed. HENT:      Ears:      Comments: S/p mastoidectomy left, injected   Cardiovascular:      Rate and Rhythm: Normal rate and regular rhythm. Heart sounds: Normal heart sounds. No murmur heard. Pulmonary:      Effort: Pulmonary effort is normal. No respiratory distress. Breath sounds: Normal breath sounds. Skin:     General: Skin is warm and dry. Neurological:      Mental Status: She is alert and oriented to person, place, and time. /80 (BP Location: Left arm, Patient Position: Sitting, Cuff Size: Standard)   Pulse 59   Ht 4' 7" (1.397 m)   SpO2 93%   BMI 43.60 kg/m²     Vitals:    07/11/23 1416   BP: 130/80   BP Location: Left arm   Patient Position: Sitting   Cuff Size: Standard   Pulse: 59   SpO2: 93%   Height: 4' 7" (1.397 m)       Transitional Care Management Review:  Mario Rosario is a 79 y.o. female here for TCM follow up.      During the TCM phone call patient stated:    TCM Call     Date and time call was made  6/29/2023  1:11 PM    Patient was hospitialized at  [id number] [facility]    Date of Admission  06/26/23    Date of discharge  06/28/23    Diagnosis  Orbital mass Disposition  Home    Were the patients medications reviewed and updated  No      TCM Call     I have advised the patient to call PCP with any new or worsening symptoms  Booker Franz, [address]

## 2023-07-12 LAB
EST. AVERAGE GLUCOSE BLD GHB EST-MCNC: 117 MG/DL
HBA1C MFR BLD: 5.7 %

## 2023-07-13 DIAGNOSIS — N30.00 ACUTE CYSTITIS WITHOUT HEMATURIA: Primary | ICD-10-CM

## 2023-07-13 LAB — BACTERIA UR CULT: ABNORMAL

## 2023-07-13 RX ORDER — CIPROFLOXACIN 500 MG/1
500 TABLET, FILM COATED ORAL EVERY 12 HOURS SCHEDULED
Qty: 10 TABLET | Refills: 0 | Status: SHIPPED | OUTPATIENT
Start: 2023-07-13 | End: 2023-07-18

## 2023-07-31 DIAGNOSIS — F41.8 DEPRESSION WITH ANXIETY: ICD-10-CM

## 2023-07-31 RX ORDER — SERTRALINE HYDROCHLORIDE 100 MG/1
TABLET, FILM COATED ORAL
Qty: 90 TABLET | Refills: 0 | Status: ON HOLD | OUTPATIENT
Start: 2023-07-31

## 2023-08-11 ENCOUNTER — HOSPITAL ENCOUNTER (OUTPATIENT)
Dept: RADIOLOGY | Facility: HOSPITAL | Age: 83
DRG: 683 | End: 2023-08-11
Payer: MEDICARE

## 2023-08-11 ENCOUNTER — OFFICE VISIT (OUTPATIENT)
Dept: FAMILY MEDICINE CLINIC | Facility: CLINIC | Age: 83
End: 2023-08-11
Payer: MEDICARE

## 2023-08-11 VITALS
WEIGHT: 185 LBS | BODY MASS INDEX: 42.81 KG/M2 | HEART RATE: 59 BPM | HEIGHT: 55 IN | OXYGEN SATURATION: 93 % | SYSTOLIC BLOOD PRESSURE: 130 MMHG | DIASTOLIC BLOOD PRESSURE: 70 MMHG

## 2023-08-11 DIAGNOSIS — M25.512 ACUTE PAIN OF LEFT SHOULDER: ICD-10-CM

## 2023-08-11 DIAGNOSIS — M25.512 ACUTE PAIN OF LEFT SHOULDER: Primary | ICD-10-CM

## 2023-08-11 PROCEDURE — 73030 X-RAY EXAM OF SHOULDER: CPT

## 2023-08-11 PROCEDURE — 99213 OFFICE O/P EST LOW 20 MIN: CPT | Performed by: NURSE PRACTITIONER

## 2023-08-11 RX ORDER — OXYCODONE HYDROCHLORIDE 5 MG/1
5 TABLET ORAL EVERY 4 HOURS PRN
Qty: 20 TABLET | Refills: 0 | Status: SHIPPED | OUTPATIENT
Start: 2023-08-11

## 2023-08-11 RX ORDER — MIRABEGRON 50 MG/1
1 TABLET, FILM COATED, EXTENDED RELEASE ORAL DAILY
COMMUNITY
Start: 2023-05-31

## 2023-08-11 NOTE — PROGRESS NOTES
Assessment/Plan:     Chronic Problems:  No problem-specific Assessment & Plan notes found for this encounter. Visit Diagnosis:  Diagnoses and all orders for this visit:    Acute pain of left shoulder  Comments:  NV intact. sent for stat xray and stat ortho consult. Oxycodone very sparingly as needed. Orders:  -     XR shoulder 2+ vw left; Future  -     Ambulatory Referral to Orthopedic Surgery; Future  -     oxyCODONE (ROXICODONE) 5 immediate release tablet; Take 1 tablet (5 mg total) by mouth every 4 (four) hours as needed for moderate pain Max Daily Amount: 30 mg    Other orders  -     Myrbetriq 50 MG TB24; Take 1 tablet by mouth daily          Subjective:    Patient ID: Antonio Cifuentes is a 79 y.o. female. Patient presents with left shoulder pain. Patient states that it has been painful for about 1 month. Patient denies injury. Patient states she really has no range of motion in her left arm. She is unable to raise her arm without assistance. Patient denies numbness or tingling in the arm. Patient states her pain is 10 out of 10. Patient states she has never had surgery on the left shoulder, but did have several dislocations of the right with subsequent surgery. The following portions of the patient's history were reviewed and updated as appropriate: allergies, current medications, past family history, past medical history, past social history, past surgical history and problem list.    Review of Systems   Constitutional: Negative. Respiratory: Negative. Cardiovascular: Negative. Musculoskeletal: Positive for arthralgias (left shoulder). Neurological: Negative for numbness.          /70   Pulse 59   Ht 4' 7" (1.397 m)   Wt 83.9 kg (185 lb)   SpO2 93%   BMI 43.00 kg/m²   Social History     Socioeconomic History   • Marital status: Single     Spouse name: Not on file   • Number of children: Not on file   • Years of education: Not on file   • Highest education level: Not on file   Occupational History   • Occupation: retired   Tobacco Use   • Smoking status: Never   • Smokeless tobacco: Never   Vaping Use   • Vaping Use: Never used   Substance and Sexual Activity   • Alcohol use: No   • Drug use: No   • Sexual activity: Never   Other Topics Concern   • Not on file   Social History Narrative    Denied drinking coffee    Denied exercise habits    Most recent tobacco use screenin2018      Do you currently or have you served in the [address] [place] [address] Street:   No      Were you activated, into active duty, as a member of the [employer] or as a Reservist:   No          Social Determinants of Health     Financial Resource Strain: Medium Risk (9/15/2022)    Overall Financial Resource Strain (CARDIA)    • Difficulty of Paying Living Expenses: Somewhat hard   Food Insecurity: No Food Insecurity (2023)    Hunger Vital Sign    • Worried About Running Out of Food in the Last Year: Never true    • Ran Out of Food in the Last Year: Never true   Transportation Needs: No Transportation Needs (2023)    PRAPARE - Transportation    • Lack of Transportation (Medical): No    • Lack of Transportation (Non-Medical):  No   Physical Activity: Not on file   Stress: Not on file   Social Connections: Not on file   Intimate Partner Violence: Not on file   Housing Stability: Low Risk  (2023)    Housing Stability Vital Sign    • Unable to Pay for Housing in the Last Year: No    • Number of Places Lived in the Last Year: 1    • Unstable Housing in the Last Year: No     Past Medical History:   Diagnosis Date   • Anemia 2018   • Anxiety    • Arthritis    • AVB (atrioventricular block)     first degree   • Cataract    • CHF (congestive heart failure) (HCC)    • COPD, mild (HCC)    • Coronary artery disease    • Dislocation of right shoulder joint    • Frequent UTI    • GERD (gastroesophageal reflux disease)    • H/O: pneumonia    • Heme positive stool    • Hyperlipidemia    • Hypertension    • Hypothyroidism    • Morbid obesity with BMI of 50.0-59.9, adult ([address])    • Obesity, morbid ([address]) 08/22/2018   • JANICE on CPAP    • Pulmonary hypertension ([address]) 08/22/2018   • Severe aortic stenosis    • Simple goiter    • Skin cyst     within the armpits, right   • Wears glasses      Family History   Problem Relation Age of Onset   • Diabetes Mother    • Stroke Mother    • Cancer Father    • Lung cancer Father    • Diabetes Sister    • Heart disease Sister    • Hypertension Sister    • Coronary artery disease Family    • Diabetes Family    • Hypertension Family    • Cancer Family    • Stroke Family    • Thyroid disease Neg Hx      Past Surgical History:   Procedure Laterality Date   • BREAST BIOPSY     • CARDIAC CATHETERIZATION     • CARPAL TUNNEL RELEASE Bilateral    • CHOLECYSTECTOMY     • DILATION AND CURETTAGE OF UTERUS     • HYSTEROSCOPY     • MASTOID SURGERY     • MS COLONOSCOPY FLX DX W/COLLJ SPEC WHEN PFRMD N/A 9/6/2018    Procedure: COLONOSCOPY;  Surgeon: Timur Luong MD;  Location: [facility]; Service: Gastroenterology   • MS ECHO TRANSESOPHAG R-T 2D W/PRB IMG ACQUISJ I&R N/A 10/9/2018    Procedure: INTRA-OP TRANSESOPHAGEAL ECHOCARDIOGRAM (GARRISON); Surgeon: Vesta Cast DO;  Location: [facility];  Service: Cardiac Surgery   • MS ESOPHAGOGASTRODUODENOSCOPY TRANSORAL DIAGNOSTIC N/A 8/31/2018    Procedure: ESOPHAGOGASTRODUODENOSCOPY (EGD); Surgeon: Timur Luong MD;  Location: [facility]; Service: Gastroenterology   • MS REPLACE AORTIC VALVE OPENFEMORAL ARTERY APPROACH N/A 10/9/2018    Procedure: REPLACEMENT AORTIC VALVE TRANSCATHETER (TAVR) TRANSFEMORAL W/ 23 MM MENDOZA NOE S3 VALVE (ACCESS OF LEFT);   Surgeon: Vesta Cast DO;  Location: [facility];  Service: Cardiac Surgery   • TOTAL HIP ARTHROPLASTY Left 2007   • TOTAL KNEE ARTHROPLASTY Bilateral        Current Outpatient Medications:   •  acetaminophen (TYLENOL) 500 mg tablet, Take 500 mg by mouth every 6 (six) hours as needed, Disp: , Rfl:   •  albuterol (2.5 mg/3 mL) 0.083 % nebulizer solution, Take 3 mL (2.5 mg total) by nebulization every 6 (six) hours as needed for wheezing or shortness of breath (Patient taking differently: Take 2.5 mg by nebulization every 6 (six) hours as needed for wheezing or shortness of breath PRN), Disp: 360 mL, Rfl: 5  •  albuterol (PROVENTIL HFA,VENTOLIN HFA) 90 mcg/act inhaler, Inhale 2 puffs every 6 (six) hours as needed for wheezing, Disp: 1 Inhaler, Rfl: 3  •  Ascorbic Acid, Vitamin C, (VITAMIN C) 100 MG tablet, Take 100 mg by mouth daily, Disp: , Rfl:   •  aspirin (ECOTRIN LOW STRENGTH) 81 mg EC tablet, Take 1 tablet (81 mg total) by mouth daily, Disp: 100 tablet, Rfl: 0  •  b complex vitamins capsule, Take 1 capsule by mouth 2 (two) times a day  , Disp: , Rfl:   •  budesonide-formoterol (Symbicort) 160-4.5 mcg/act inhaler, Inhale 2 puffs 2 (two) times a day, Disp: 10.2 g, Rfl: 3  •  Calcium Carb-Cholecalciferol (CALCIUM 600 + D PO), Take 1 tablet by mouth 2 (two) times a day, Disp: , Rfl:   •  Calcium Carb-Cholecalciferol 600-10 MG-MCG TABS, Take 1 tablet by mouth, Disp: , Rfl:   •  ciprofloxacin (CILOXAN) 0.3 % ophthalmic ointment, Administer into the left eye 3 (three) times a day, Disp: 3.5 g, Rfl: 0  •  Cranberry 1000 MG CAPS, Take by mouth, Disp: , Rfl:   •  Cranberry 250 MG TABS, Take by mouth, Disp: , Rfl:   •  Fesoterodine Fumarate ER (Toviaz) 8 MG TB24, Take 8 mg by mouth daily , Disp: , Rfl:   •  ibuprofen (MOTRIN) 200 mg tablet, Take 200 mg by mouth every 6 (six) hours as needed, Disp: , Rfl:   •  Myrbetriq 50 MG TB24, Take 1 tablet by mouth daily, Disp: , Rfl:   •  olmesartan (BENICAR) 5 mg tablet, TAKE 2 TABLETS BY MOUTH EVERY DAY, Disp: 180 tablet, Rfl: 3  •  omeprazole (PriLOSEC) 40 MG capsule, TAKE 1 CAPSULE BY MOUTH TWICE A DAY, Disp: 180 capsule, Rfl: 1  •  oxyCODONE (ROXICODONE) 5 immediate release tablet, Take 1 tablet (5 mg total) by mouth every 4 (four) hours as needed for moderate pain Max Daily Amount: 30 mg, Disp: 20 tablet, Rfl: 0  •  sertraline (ZOLOFT) 100 mg tablet, TAKE 1 TABLET BY MOUTH EVERY DAY, Disp: 90 tablet, Rfl: 0  •  simvastatin (ZOCOR) 40 mg tablet, TAKE 1 TABLET BY MOUTH EVERYDAY AT BEDTIME, Disp: 90 tablet, Rfl: 1  •  furosemide (LASIX) 20 mg tablet, Take 1 tablet (20 mg total) by mouth daily as needed (wt gain) In the morning, Disp: 90 tablet, Rfl: 3  •  levothyroxine 50 mcg tablet, TAKE 1 TABLET BY MOUTH EVERY DAY, Disp: 90 tablet, Rfl: 1    Allergies   Allergen Reactions   • Latex Rash   • Neosporin [Neomycin-Bacitracin Zn-Polymyx] Rash and Other (See Comments)     hives per [facility] order          Lab Review   Appointment on 07/11/2023   Component Date Value   • WBC 07/11/2023 8.34    • RBC 07/11/2023 4.23    • Hemoglobin 07/11/2023 11.9    • Hematocrit 07/11/2023 38.3    • MCV 07/11/2023 91    • MCH 07/11/2023 28.1    • MCHC 07/11/2023 31.1 (L)    • RDW 07/11/2023 15.9 (H)    • MPV 07/11/2023 10.1    • Platelets 70/18/2443 384    • nRBC 07/11/2023 0    • Neutrophils Relative 07/11/2023 60    • Immat GRANS % 07/11/2023 0    • Lymphocytes Relative 07/11/2023 25    • Monocytes Relative 07/11/2023 7    • Eosinophils Relative 07/11/2023 7 (H)    • Basophils Relative 07/11/2023 1    • Neutrophils Absolute 07/11/2023 5.06    • Immature Grans Absolute 07/11/2023 0.03    • Lymphocytes Absolute 07/11/2023 2.04    • Monocytes Absolute 07/11/2023 0.59    • Eosinophils Absolute 07/11/2023 0.56    • Basophils Absolute 07/11/2023 0.06    • Sodium 07/11/2023 139    • Potassium 07/11/2023 4.8    • Chloride 07/11/2023 107    • CO2 07/11/2023 26    • ANION GAP 07/11/2023 6    • BUN 07/11/2023 29 (H)    • Creatinine 07/11/2023 1.13    • Glucose, Fasting 07/11/2023 106 (H)    • Calcium 07/11/2023 10.7 (H)    • AST 07/11/2023 20    • ALT 07/11/2023 18    • Alkaline Phosphatase 07/11/2023 73    • Total Protein 07/11/2023 7.5    • Albumin 07/11/2023 4.2    • Total Bilirubin 07/11/2023 0.46    • eGFR 07/11/2023 45    • Hemoglobin A1C 07/11/2023 5.7 (H)    • EAG 07/11/2023 117    • Cholesterol 07/11/2023 145    • Triglycerides 07/11/2023 118    • HDL, Direct 07/11/2023 67    • LDL Calculated 07/11/2023 54    • Non-HDL-Chol (CHOL-HDL) 07/11/2023 78    • TSH 3RD GENERATON 07/11/2023 1.235    • Color, UA 07/11/2023 Light Yellow    • Clarity, UA 07/11/2023 Clear    • Specific Gravity, UA 07/11/2023 1.011    • pH, UA 07/11/2023 5.5    • Leukocytes, UA 07/11/2023 Moderate (A)    • Nitrite, UA 07/11/2023 Negative    • Protein, UA 07/11/2023 Negative    • Glucose, UA 07/11/2023 Negative    • Ketones, UA 07/11/2023 Negative    • Urobilinogen, UA 07/11/2023 <2.0    • Bilirubin, UA 07/11/2023 Negative    • Occult Blood, UA 07/11/2023 Negative    • RBC, UA 07/11/2023 1-2    • WBC, UA 07/11/2023 30-50 (A)    • Epithelial Cells 07/11/2023 Occasional    • Bacteria, UA 07/11/2023 Occasional    • Hyaline Casts, UA 07/11/2023 0-3 (A)    • Urine Culture 07/11/2023 >100,000 cfu/ml Escherichia coli (A)    Admission on 06/26/2023, Discharged on 06/28/2023   Component Date Value   • WBC 06/26/2023 11.56 (H)    • RBC 06/26/2023 4.32    • Hemoglobin 06/26/2023 12.1    • Hematocrit 06/26/2023 39.6    • MCV 06/26/2023 92    • MCH 06/26/2023 28.0    • MCHC 06/26/2023 30.6 (L)    • RDW 06/26/2023 16.4 (H)    • Platelets 51/24/5819 288    • MPV 06/26/2023 10.2    • Protime 06/26/2023 13.6    • INR 06/26/2023 1.02    • Sodium 06/27/2023 140    • Potassium 06/27/2023 5.3    • Chloride 06/27/2023 108    • CO2 06/27/2023 28    • ANION GAP 06/27/2023 4    • BUN 06/27/2023 33 (H)    • Creatinine 06/27/2023 0.92    • Glucose 06/27/2023 87    • Calcium 06/27/2023 9.6    • eGFR 06/27/2023 57    • Sodium 06/28/2023 138    • Potassium 06/28/2023 4.7    • Chloride 06/28/2023 105    • CO2 06/28/2023 28    • ANION GAP 06/28/2023 5    • BUN 06/28/2023 25    • Creatinine 06/28/2023 0.76    • Glucose 06/28/2023 92    • Calcium 06/28/2023 9.7 • eGFR 06/28/2023 72    • WBC 06/28/2023 8.59    • RBC 06/28/2023 4.35    • Hemoglobin 06/28/2023 12.1    • Hematocrit 06/28/2023 39.6    • MCV 06/28/2023 91    • MCH 06/28/2023 27.8    • MCHC 06/28/2023 30.6 (L)    • RDW 06/28/2023 16.5 (H)    • MPV 06/28/2023 10.5    • Platelets 78/41/0439 288    • nRBC 06/28/2023 0    • Neutrophils Relative 06/28/2023 57    • Immat GRANS % 06/28/2023 1    • Lymphocytes Relative 06/28/2023 26    • Monocytes Relative 06/28/2023 9    • Eosinophils Relative 06/28/2023 6    • Basophils Relative 06/28/2023 1    • Neutrophils Absolute 06/28/2023 4.94    • Immature Grans Absolute 06/28/2023 0.04    • Lymphocytes Absolute 06/28/2023 2.23    • Monocytes Absolute 06/28/2023 0.79    • Eosinophils Absolute 06/28/2023 0.53    • Basophils Absolute 06/28/2023 0.06    • TSH 3RD GENERATON 06/28/2023 2.021    Admission on 06/26/2023, Discharged on 06/26/2023   Component Date Value   • WBC 06/26/2023 8.20    • RBC 06/26/2023 4.07    • Hemoglobin 06/26/2023 11.5    • Hematocrit 06/26/2023 36.8    • MCV 06/26/2023 90    • MCH 06/26/2023 28.3    • MCHC 06/26/2023 31.3 (L)    • RDW 06/26/2023 16.3 (H)    • MPV 06/26/2023 9.8    • Platelets 04/19/9202 278    • nRBC 06/26/2023 0    • Neutrophils Relative 06/26/2023 54    • Immat GRANS % 06/26/2023 1    • Lymphocytes Relative 06/26/2023 27    • Monocytes Relative 06/26/2023 10    • Eosinophils Relative 06/26/2023 7 (H)    • Basophils Relative 06/26/2023 1    • Neutrophils Absolute 06/26/2023 4.60    • Immature Grans Absolute 06/26/2023 0.04    • Lymphocytes Absolute 06/26/2023 2.17    • Monocytes Absolute 06/26/2023 0.80    • Eosinophils Absolute 06/26/2023 0.54    • Basophils Absolute 06/26/2023 0.05    • Sodium 06/26/2023 138    • Potassium 06/26/2023 4.8    • Chloride 06/26/2023 106    • CO2 06/26/2023 26    • ANION GAP 06/26/2023 6    • BUN 06/26/2023 45 (H)    • Creatinine 06/26/2023 1.14    • Glucose 06/26/2023 92    • Calcium 06/26/2023 10.3 (H) • AST 06/26/2023 27    • ALT 06/26/2023 23    • Alkaline Phosphatase 06/26/2023 61    • Total Protein 06/26/2023 7.0    • Albumin 06/26/2023 3.9    • Total Bilirubin 06/26/2023 0.46    • eGFR 06/26/2023 44    • Protime 06/26/2023 13.5    • INR 06/26/2023 1.05    • PTT 06/26/2023 30    • hs TnI 0hr 06/26/2023 11    • Ventricular Rate 06/26/2023 53    • Atrial Rate 06/26/2023 53    • TX Interval 06/26/2023 232    • QRSD Interval 06/26/2023 82    • QT Interval 06/26/2023 442    • QTC Interval 06/26/2023 414    • P Axis 06/26/2023 52    • QRS Axis 06/26/2023 -27    • T Wave Axis 06/26/2023 45    • hs TnI 2hr 06/26/2023 8    • Delta 2hr hsTnI 06/26/2023 -3    • Ventricular Rate 06/26/2023 60    • Atrial Rate 06/26/2023 60    • TX Interval 06/26/2023 234    • QRSD Interval 06/26/2023 80    • QT Interval 06/26/2023 438    • QTC Interval 06/26/2023 438    • P Axis 06/26/2023 50    • QRS Axis 06/26/2023 -37    • T Wave Axis 06/26/2023 52    • Ventricular Rate 06/26/2023 45    • Atrial Rate 06/26/2023 65    • QRSD Interval 06/26/2023 80    • QT Interval 06/26/2023 464    • QTC Interval 06/26/2023 401    • P Axis 06/26/2023 55    • QRS Axis 06/26/2023 -33    • T Wave Axis 06/26/2023 48         Imaging: No results found. Objective:     Physical Exam  Cardiovascular:      Rate and Rhythm: Normal rate and regular rhythm. Heart sounds: No murmur heard. Pulmonary:      Effort: Pulmonary effort is normal. No respiratory distress. Musculoskeletal:      Left shoulder: Deformity and tenderness present. Decreased range of motion. Decreased strength. Normal pulse. Neurological:      Mental Status: She is oriented to person, place, and time. Cranial Nerves: No cranial nerve deficit. Sensory: No sensory deficit. Motor: Weakness (left hand) present. There are no Patient Instructions on file for this visit.     MABEL Hallman    Portions of the record may have been created with voice recognition software. Occasional wrong word or "sound a like" substitutions may have occurred due to the inherent limitations of voice recognition software. Read the chart carefully and recognize, using context, where substitutions have occurred.

## 2023-08-14 ENCOUNTER — APPOINTMENT (EMERGENCY)
Dept: CT IMAGING | Facility: HOSPITAL | Age: 83
DRG: 683 | End: 2023-08-14
Payer: MEDICARE

## 2023-08-14 ENCOUNTER — HOSPITAL ENCOUNTER (INPATIENT)
Facility: HOSPITAL | Age: 83
LOS: 4 days | Discharge: NURSING, REHAB OR LONG-TERM CARE | DRG: 683 | End: 2023-08-18
Attending: EMERGENCY MEDICINE | Admitting: STUDENT IN AN ORGANIZED HEALTH CARE EDUCATION/TRAINING PROGRAM
Payer: MEDICARE

## 2023-08-14 ENCOUNTER — APPOINTMENT (EMERGENCY)
Dept: RADIOLOGY | Facility: HOSPITAL | Age: 83
DRG: 683 | End: 2023-08-14
Payer: MEDICARE

## 2023-08-14 DIAGNOSIS — R60.0 LOWER EXTREMITY EDEMA: ICD-10-CM
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
## 2023-08-14 DIAGNOSIS — N17.9 AKI (ACUTE KIDNEY INJURY) (HCC): Primary | ICD-10-CM

## 2023-08-14 DIAGNOSIS — D50.8 IRON DEFICIENCY ANEMIA SECONDARY TO INADEQUATE DIETARY IRON INTAKE: ICD-10-CM

## 2023-08-14 DIAGNOSIS — R53.1 GENERALIZED WEAKNESS: ICD-10-CM

## 2023-08-14 DIAGNOSIS — R26.2 AMBULATORY DYSFUNCTION: ICD-10-CM
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
## 2023-08-14 PROBLEM — N18.9 ACUTE-ON-CHRONIC KIDNEY INJURY (HCC): Status: ACTIVE | Noted: 2023-08-14

## 2023-08-14 PROBLEM — D72.829 LEUKOCYTOSIS: Status: ACTIVE | Noted: 2023-08-14

## 2023-08-14 LAB
2HR DELTA HS TROPONIN: -1 NG/L
4HR DELTA HS TROPONIN: -3 NG/L
ANION GAP SERPL CALCULATED.3IONS-SCNC: 9 MMOL/L
BASOPHILS # BLD AUTO: 0.02 THOUSANDS/ÂΜL (ref 0–0.1)
BASOPHILS NFR BLD AUTO: 0 % (ref 0–1)
BUN SERPL-MCNC: 64 MG/DL (ref 5–25)
CALCIUM SERPL-MCNC: 9.8 MG/DL (ref 8.4–10.2)
CARDIAC TROPONIN I PNL SERPL HS: 35 NG/L
CARDIAC TROPONIN I PNL SERPL HS: 37 NG/L
CARDIAC TROPONIN I PNL SERPL HS: 38 NG/L
CHLORIDE SERPL-SCNC: 102 MMOL/L (ref 96–108)
CK SERPL-CCNC: 540 U/L (ref 26–192)
CO2 SERPL-SCNC: 22 MMOL/L (ref 21–32)
CREAT SERPL-MCNC: 2.26 MG/DL (ref 0.6–1.3)
EOSINOPHIL # BLD AUTO: 0.11 THOUSAND/ÂΜL (ref 0–0.61)
EOSINOPHIL NFR BLD AUTO: 1 % (ref 0–6)
ERYTHROCYTE [DISTWIDTH] IN BLOOD BY AUTOMATED COUNT: 14.6 % (ref 11.6–15.1)
GFR SERPL CREATININE-BSD FRML MDRD: 19 ML/MIN/1.73SQ M
GLUCOSE SERPL-MCNC: 118 MG/DL (ref 65–140)
HCT VFR BLD AUTO: 33.9 % (ref 34.8–46.1)
HGB BLD-MCNC: 10.6 G/DL (ref 11.5–15.4)
IMM GRANULOCYTES # BLD AUTO: 0.13 THOUSAND/UL (ref 0–0.2)
IMM GRANULOCYTES NFR BLD AUTO: 1 % (ref 0–2)
LYMPHOCYTES # BLD AUTO: 1.01 THOUSANDS/ÂΜL (ref 0.6–4.47)
LYMPHOCYTES NFR BLD AUTO: 6 % (ref 14–44)
MCH RBC QN AUTO: 28.3 PG (ref 26.8–34.3)
MCHC RBC AUTO-ENTMCNC: 31.3 G/DL (ref 31.4–37.4)
MCV RBC AUTO: 90 FL (ref 82–98)
MONOCYTES # BLD AUTO: 1.33 THOUSAND/ÂΜL (ref 0.17–1.22)
MONOCYTES NFR BLD AUTO: 8 % (ref 4–12)
NEUTROPHILS # BLD AUTO: 14.84 THOUSANDS/ÂΜL (ref 1.85–7.62)
NEUTS SEG NFR BLD AUTO: 84 % (ref 43–75)
NRBC BLD AUTO-RTO: 0 /100 WBCS
PLATELET # BLD AUTO: 322 THOUSANDS/UL (ref 149–390)
PMV BLD AUTO: 9.4 FL (ref 8.9–12.7)
POTASSIUM SERPL-SCNC: 4.6 MMOL/L (ref 3.5–5.3)
RBC # BLD AUTO: 3.75 MILLION/UL (ref 3.81–5.12)
SODIUM SERPL-SCNC: 133 MMOL/L (ref 135–147)
WBC # BLD AUTO: 17.44 THOUSAND/UL (ref 4.31–10.16)

## 2023-08-14 PROCEDURE — 94660 CPAP INITIATION&MGMT: CPT

## 2023-08-14 PROCEDURE — 72125 CT NECK SPINE W/O DYE: CPT

## 2023-08-14 PROCEDURE — 84484 ASSAY OF TROPONIN QUANT: CPT | Performed by: EMERGENCY MEDICINE

## 2023-08-14 PROCEDURE — G1004 CDSM NDSC: HCPCS

## 2023-08-14 PROCEDURE — 94664 DEMO&/EVAL PT USE INHALER: CPT

## 2023-08-14 PROCEDURE — 99222 1ST HOSP IP/OBS MODERATE 55: CPT

## 2023-08-14 PROCEDURE — 73502 X-RAY EXAM HIP UNI 2-3 VIEWS: CPT

## 2023-08-14 PROCEDURE — 99285 EMERGENCY DEPT VISIT HI MDM: CPT | Performed by: EMERGENCY MEDICINE

## 2023-08-14 PROCEDURE — 94760 N-INVAS EAR/PLS OXIMETRY 1: CPT

## 2023-08-14 PROCEDURE — 82550 ASSAY OF CK (CPK): CPT | Performed by: EMERGENCY MEDICINE

## 2023-08-14 PROCEDURE — 80048 BASIC METABOLIC PNL TOTAL CA: CPT | Performed by: EMERGENCY MEDICINE

## 2023-08-14 PROCEDURE — 94002 VENT MGMT INPAT INIT DAY: CPT

## 2023-08-14 PROCEDURE — 96361 HYDRATE IV INFUSION ADD-ON: CPT

## 2023-08-14 PROCEDURE — 36415 COLL VENOUS BLD VENIPUNCTURE: CPT | Performed by: EMERGENCY MEDICINE

## 2023-08-14 PROCEDURE — 85025 COMPLETE CBC W/AUTO DIFF WBC: CPT | Performed by: EMERGENCY MEDICINE

## 2023-08-14 PROCEDURE — 73630 X-RAY EXAM OF FOOT: CPT

## 2023-08-14 PROCEDURE — 70450 CT HEAD/BRAIN W/O DYE: CPT

## 2023-08-14 PROCEDURE — 99284 EMERGENCY DEPT VISIT MOD MDM: CPT

## 2023-08-14 PROCEDURE — 96360 HYDRATION IV INFUSION INIT: CPT

## 2023-08-14 PROCEDURE — 93005 ELECTROCARDIOGRAM TRACING: CPT

## 2023-08-14 RX ORDER — ACETAMINOPHEN 325 MG/1
650 TABLET ORAL EVERY 6 HOURS PRN
Status: DISCONTINUED | OUTPATIENT
Start: 2023-08-14 | End: 2023-08-19 | Stop reason: HOSPADM

## 2023-08-14 RX ORDER — SERTRALINE HYDROCHLORIDE 100 MG/1
100 TABLET, FILM COATED ORAL DAILY
Status: DISCONTINUED | OUTPATIENT
Start: 2023-08-15 | End: 2023-08-19 | Stop reason: HOSPADM

## 2023-08-14 RX ORDER — ALBUTEROL SULFATE 90 UG/1
2 AEROSOL, METERED RESPIRATORY (INHALATION) EVERY 6 HOURS PRN
Status: DISCONTINUED | OUTPATIENT
Start: 2023-08-14 | End: 2023-08-19 | Stop reason: HOSPADM

## 2023-08-14 RX ORDER — LIDOCAINE 50 MG/G
1 PATCH TOPICAL DAILY
Status: DISCONTINUED | OUTPATIENT
Start: 2023-08-14 | End: 2023-08-19 | Stop reason: HOSPADM

## 2023-08-14 RX ORDER — ACETAMINOPHEN 325 MG/1
975 TABLET ORAL ONCE
Status: COMPLETED | OUTPATIENT
Start: 2023-08-14 | End: 2023-08-14

## 2023-08-14 RX ORDER — HEPARIN SODIUM 5000 [USP'U]/ML
5000 INJECTION, SOLUTION INTRAVENOUS; SUBCUTANEOUS EVERY 8 HOURS SCHEDULED
Status: DISCONTINUED | OUTPATIENT
Start: 2023-08-14 | End: 2023-08-19 | Stop reason: HOSPADM

## 2023-08-14 RX ORDER — BUDESONIDE AND FORMOTEROL FUMARATE DIHYDRATE 160; 4.5 UG/1; UG/1
2 AEROSOL RESPIRATORY (INHALATION) 2 TIMES DAILY
Status: DISCONTINUED | OUTPATIENT
Start: 2023-08-14 | End: 2023-08-19 | Stop reason: HOSPADM

## 2023-08-14 RX ORDER — TIZANIDINE 2 MG/1
4 TABLET ORAL EVERY 8 HOURS PRN
Status: DISCONTINUED | OUTPATIENT
Start: 2023-08-14 | End: 2023-08-19 | Stop reason: HOSPADM

## 2023-08-14 RX ORDER — FUROSEMIDE 20 MG/1
20 TABLET ORAL DAILY PRN
Status: DISCONTINUED | OUTPATIENT
Start: 2023-08-14 | End: 2023-08-19 | Stop reason: HOSPADM

## 2023-08-14 RX ORDER — SODIUM CHLORIDE, SODIUM LACTATE, POTASSIUM CHLORIDE, CALCIUM CHLORIDE 600; 310; 30; 20 MG/100ML; MG/100ML; MG/100ML; MG/100ML
75 INJECTION, SOLUTION INTRAVENOUS CONTINUOUS
Status: DISPENSED | OUTPATIENT
Start: 2023-08-14 | End: 2023-08-15

## 2023-08-14 RX ORDER — LEVOTHYROXINE SODIUM 0.05 MG/1
50 TABLET ORAL DAILY
Status: DISCONTINUED | OUTPATIENT
Start: 2023-08-15 | End: 2023-08-19 | Stop reason: HOSPADM

## 2023-08-14 RX ORDER — PRAVASTATIN SODIUM 80 MG/1
80 TABLET ORAL
Status: DISCONTINUED | OUTPATIENT
Start: 2023-08-15 | End: 2023-08-19 | Stop reason: HOSPADM

## 2023-08-14 RX ORDER — OXYBUTYNIN CHLORIDE 5 MG/1
10 TABLET, EXTENDED RELEASE ORAL DAILY
Status: DISCONTINUED | OUTPATIENT
Start: 2023-08-15 | End: 2023-08-19 | Stop reason: HOSPADM

## 2023-08-14 RX ORDER — LOSARTAN POTASSIUM 25 MG/1
25 TABLET ORAL DAILY
Status: DISCONTINUED | OUTPATIENT
Start: 2023-08-15 | End: 2023-08-16

## 2023-08-14 RX ORDER — OXYCODONE HYDROCHLORIDE 5 MG/1
5 TABLET ORAL EVERY 6 HOURS PRN
Status: DISCONTINUED | OUTPATIENT
Start: 2023-08-14 | End: 2023-08-19 | Stop reason: HOSPADM

## 2023-08-14 RX ORDER — LIDOCAINE 50 MG/G
1 PATCH TOPICAL DAILY
Status: DISCONTINUED | OUTPATIENT
Start: 2023-08-15 | End: 2023-08-14

## 2023-08-14 RX ORDER — PANTOPRAZOLE SODIUM 40 MG/1
40 TABLET, DELAYED RELEASE ORAL
Status: DISCONTINUED | OUTPATIENT
Start: 2023-08-15 | End: 2023-08-19 | Stop reason: HOSPADM

## 2023-08-14 RX ADMIN — OXYCODONE HYDROCHLORIDE 5 MG: 5 TABLET ORAL at 20:44

## 2023-08-14 RX ADMIN — SODIUM CHLORIDE, SODIUM LACTATE, POTASSIUM CHLORIDE, AND CALCIUM CHLORIDE 75 ML/HR: .6; .31; .03; .02 INJECTION, SOLUTION INTRAVENOUS at 20:29

## 2023-08-14 RX ADMIN — SODIUM CHLORIDE 500 ML: 0.9 INJECTION, SOLUTION INTRAVENOUS at 17:08

## 2023-08-14 RX ADMIN — HEPARIN SODIUM 5000 UNITS: 5000 INJECTION INTRAVENOUS; SUBCUTANEOUS at 22:14

## 2023-08-14 RX ADMIN — LIDOCAINE 5% 1 PATCH: 700 PATCH TOPICAL at 20:44

## 2023-08-14 RX ADMIN — BUDESONIDE AND FORMOTEROL FUMARATE DIHYDRATE 2 PUFF: 160; 4.5 AEROSOL RESPIRATORY (INHALATION) at 22:14

## 2023-08-14 RX ADMIN — Medication 2 G: at 22:14

## 2023-08-14 RX ADMIN — ACETAMINOPHEN 975 MG: 325 TABLET, FILM COATED ORAL at 15:56

## 2023-08-14 NOTE — ASSESSMENT & PLAN NOTE
Patient reports experiencing multiple falls over the past week. She feels that her mechanical and have occurred due to her feeling generally weak. Denies head strike or LOC, and reports that after each fall she was able to lift herself off the ground without assistance. Currently has complaints of some left foot, left shoulder (chronic, being worked up as outpatient), and neck pain, prompting ED visit. Denies other acute symptom/complaint at this time.     /50   Pulse 63   Temp 98.5 °F (36.9 °C) (Oral)   Resp 20   SpO2 96%     · Multiple falls over the past week; reportedly mechanical 2/2 generalized weakness  · Has complaints of left foot and neck pain  · Denies head strike, LOC, or prodromal symptom  · Patient able to lift herself from the ground   · CT trauma workup negative for acute traumatic injuries   · PT/OT and case management consulted  · Supportive care and PRN pain control   · Fall precautions

## 2023-08-14 NOTE — ASSESSMENT & PLAN NOTE
Lab Results   Component Value Date    EGFR 19 08/14/2023    EGFR 45 07/11/2023    EGFR 72 06/28/2023    CREATININE 2.26 (H) 08/14/2023    CREATININE 1.13 07/11/2023    CREATININE 0.76 06/28/2023     · Creatinine 2.26; baseline around 1.1   · Patient reports poor oral intake; suspecting prerenal dehydration as cause  · Denies flank/abd pain   · Gentle IVF hydration overnight  · Avoid nephrotoxic agents   · AM BMP

## 2023-08-14 NOTE — ASSESSMENT & PLAN NOTE
Wt Readings from Last 3 Encounters:   08/11/23 83.9 kg (185 lb)   04/13/23 85.1 kg (187 lb 9.6 oz)   03/22/23 83.5 kg (184 lb)     · Appears euvolemic on admission   · 06/2022 ECHO EF 60%  · Continue home PRN PO lasix   · Monitor for s/s of volume retention

## 2023-08-14 NOTE — ASSESSMENT & PLAN NOTE
· HGB 10.6; was 11.9 on 07/11/2023  · Denies s/s of bleeding; monitor for development  · AM CBC ordered

## 2023-08-14 NOTE — ED PROVIDER NOTES
History  Chief Complaint   Patient presents with   • Fall     Arrives ems, c/o shoulder pain - has frequent falls at home, -BT -LOC - [address] 26-year-old female history of hypertension, CAD, heart failure, COPD on aspirin presenting with frequent falls and pain. Patient reports multiple falls over the past week. Reports mechanical falls because she is generally weak. Patient primarily complaining of left foot pain. Also reports neck pain. Denies any head strike or LOC. Patient able to get back to her feet herself. Denies any back pain. Denies any neurological changes such as motor or sensory deficits. Denies any chest pain shortness of breath. Denies abdominal pain nausea vomiting diarrhea. Denies any other complaints. Chart reviewed. Past Medical History:  08/22/2018: Anemia  No date: Anxiety  No date: Arthritis  No date: AVB (atrioventricular block)      Comment:  first degree  No date: Cataract  No date: CHF (congestive heart failure) (HCC)  No date: COPD, mild (HCC)  No date: Coronary artery disease  No date: Dislocation of right shoulder joint  No date: Frequent UTI  No date: GERD (gastroesophageal reflux disease)  No date: H/O: pneumonia  No date: Heme positive stool  No date: Hyperlipidemia  No date: Hypertension  No date: Hypothyroidism  No date: Morbid obesity with BMI of 50.0-59.9, adult ([address])  08/22/2018: Obesity, morbid ([address])  No date: JANICE on CPAP  08/22/2018: Pulmonary hypertension (HCC)  No date: Severe aortic stenosis  No date: Simple goiter  No date: Skin cyst      Comment:  within the armpits, right  No date: Wears glasses  Family History: non-contributory  Social History            Prior to Admission Medications   Prescriptions Last Dose Informant Patient Reported? Taking?    Ascorbic Acid, Vitamin C, (VITAMIN C) 100 MG tablet   Yes No   Sig: Take 100 mg by mouth daily   Calcium Carb-Cholecalciferol (CALCIUM 600 + D PO)  Self Yes No   Sig: Take 1 tablet by mouth 2 (two) times a day   Calcium Carb-Cholecalciferol 600-10 MG-MCG TABS   Yes No   Sig: Take 1 tablet by mouth   Cranberry 1000 MG CAPS   Yes No   Sig: Take by mouth   Cranberry 250 MG TABS  Self Yes No   Sig: Take by mouth   Fesoterodine Fumarate ER (Toviaz) 8 MG TB24  Self Yes No   Sig: Take 8 mg by mouth daily    Myrbetriq 50 MG TB24   Yes No   Sig: Take 1 tablet by mouth daily   acetaminophen (TYLENOL) 500 mg tablet   Yes No   Sig: Take 500 mg by mouth every 6 (six) hours as needed   albuterol (2.5 mg/3 mL) 0.083 % nebulizer solution  Self No No   Sig: Take 3 mL (2.5 mg total) by nebulization every 6 (six) hours as needed for wheezing or shortness of breath   Patient taking differently: Take 2.5 mg by nebulization every 6 (six) hours as needed for wheezing or shortness of breath PRN   albuterol (PROVENTIL HFA,VENTOLIN HFA) 90 mcg/act inhaler  Self No No   Sig: Inhale 2 puffs every 6 (six) hours as needed for wheezing   aspirin (ECOTRIN LOW STRENGTH) 81 mg EC tablet  Self No No   Sig: Take 1 tablet (81 mg total) by mouth daily   b complex vitamins capsule  Self Yes No   Sig: Take 1 capsule by mouth 2 (two) times a day     budesonide-formoterol (Symbicort) 160-4.5 mcg/act inhaler  Self No No   Sig: Inhale 2 puffs 2 (two) times a day   ciprofloxacin (CILOXAN) 0.3 % ophthalmic ointment   No No   Sig: Administer into the left eye 3 (three) times a day   furosemide (LASIX) 20 mg tablet  Self No No   Sig: Take 1 tablet (20 mg total) by mouth daily as needed (wt gain) In the morning   ibuprofen (MOTRIN) 200 mg tablet   Yes No   Sig: Take 200 mg by mouth every 6 (six) hours as needed   levothyroxine 50 mcg tablet   No No   Sig: TAKE 1 TABLET BY MOUTH EVERY DAY   olmesartan (BENICAR) 5 mg tablet   No No   Sig: TAKE 2 TABLETS BY MOUTH EVERY DAY   omeprazole (PriLOSEC) 40 MG capsule   No No   Sig: TAKE 1 CAPSULE BY MOUTH TWICE A DAY   oxyCODONE (ROXICODONE) 5 immediate release tablet   No No   Sig: Take 1 tablet (5 mg total) by mouth every 4 (four) hours as needed for moderate pain Max Daily Amount: 30 mg   sertraline (ZOLOFT) 100 mg tablet   No No   Sig: TAKE 1 TABLET BY MOUTH EVERY DAY   simvastatin (ZOCOR) 40 mg tablet   No No   Sig: TAKE 1 TABLET BY MOUTH EVERYDAY AT BEDTIME      Facility-Administered Medications: None       Past Medical History:   Diagnosis Date   • Anemia 08/22/2018   • Anxiety    • Arthritis    • AVB (atrioventricular block)     first degree   • Cataract    • CHF (congestive heart failure) (HCC)    • COPD, mild (HCC)    • Coronary artery disease    • Dislocation of right shoulder joint    • Frequent UTI    • GERD (gastroesophageal reflux disease)    • H/O: pneumonia    • Heme positive stool    • Hyperlipidemia    • Hypertension    • Hypothyroidism    • Morbid obesity with BMI of 50.0-59.9, adult ([address])    • Obesity, morbid ([address]) 08/22/2018   • JANICE on CPAP    • Pulmonary hypertension ([address]) 08/22/2018   • Severe aortic stenosis    • Simple goiter    • Skin cyst     within the armpits, right   • Wears glasses        Past Surgical History:   Procedure Laterality Date   • BREAST BIOPSY     • CARDIAC CATHETERIZATION     • CARPAL TUNNEL RELEASE Bilateral    • CHOLECYSTECTOMY     • DILATION AND CURETTAGE OF UTERUS     • HYSTEROSCOPY     • MASTOID SURGERY     • GA COLONOSCOPY FLX DX W/COLLJ SPEC WHEN PFRMD N/A 9/6/2018    Procedure: COLONOSCOPY;  Surgeon: Lea Salcedo MD;  Location: [facility]; Service: Gastroenterology   • GA ECHO TRANSESOPHAG R-T 2D W/PRB IMG ACQUISJ I&R N/A 10/9/2018    Procedure: INTRA-OP TRANSESOPHAGEAL ECHOCARDIOGRAM (GARRISON); Surgeon: Lola Jason DO;  Location:  MAIN OR;  Service: Cardiac Surgery   • GA ESOPHAGOGASTRODUODENOSCOPY TRANSORAL DIAGNOSTIC N/A 8/31/2018    Procedure: ESOPHAGOGASTRODUODENOSCOPY (EGD); Surgeon: Lea Salcedo MD;  Location: [facility];   Service: Gastroenterology   • GA REPLACE AORTIC VALVE OPENFEMORAL ARTERY APPROACH N/A 10/9/2018    Procedure: REPLACEMENT AORTIC VALVE TRANSCATHETER (TAVR) TRANSFEMORAL W/ 23 MM MENDOZA NOE S3 VALVE (ACCESS OF LEFT); Surgeon: Fady Iverson DO;  Location: [facility];  Service: Cardiac Surgery   • TOTAL HIP ARTHROPLASTY Left 2007   • TOTAL KNEE ARTHROPLASTY Bilateral        Family History   Problem Relation Age of Onset   • Diabetes Mother    • Stroke Mother    • Cancer Father    • Lung cancer Father    • Diabetes Sister    • Heart disease Sister    • Hypertension Sister    • Coronary artery disease Family    • Diabetes Family    • Hypertension Family    • Cancer Family    • Stroke Family    • Thyroid disease Neg Hx      I have reviewed and agree with the history as documented. E-Cigarette/Vaping   • E-Cigarette Use Never User      E-Cigarette/Vaping Substances   • Nicotine No    • THC No    • CBD No    • Flavoring No    • Other No    • Unknown No      Social History     Tobacco Use   • Smoking status: Never   • Smokeless tobacco: Never   Vaping Use   • Vaping Use: Never used   Substance Use Topics   • Alcohol use: No   • Drug use: No       Review of Systems   Constitutional: Negative for appetite change, chills, diaphoresis, fever and unexpected weight change. HENT: Negative for congestion and rhinorrhea. Eyes: Negative for photophobia and visual disturbance. Respiratory: Negative for cough, chest tightness and shortness of breath. Cardiovascular: Negative for chest pain, palpitations and leg swelling. Gastrointestinal: Negative for abdominal distention, abdominal pain, blood in stool, constipation, diarrhea, nausea and vomiting. Genitourinary: Negative for dysuria and hematuria. Musculoskeletal: Positive for arthralgias and neck pain. Negative for back pain, joint swelling and neck stiffness. Skin: Negative for color change, pallor, rash and wound. Neurological: Negative for dizziness, syncope, weakness, light-headedness and headaches. Psychiatric/Behavioral: Negative for agitation.    All other systems reviewed and are negative. Physical Exam  Physical Exam  Vitals and nursing note reviewed. Constitutional:       General: She is not in acute distress. Appearance: Normal appearance. She is well-developed. She is not ill-appearing, toxic-appearing or diaphoretic. HENT:      Head: Normocephalic and atraumatic. Nose: Nose normal. No congestion or rhinorrhea. Mouth/Throat:      Mouth: Mucous membranes are moist.      Pharynx: Oropharynx is clear. No oropharyngeal exudate or posterior oropharyngeal erythema. Eyes:      General: No scleral icterus. Right eye: No discharge. Left eye: No discharge. Extraocular Movements: Extraocular movements intact. Conjunctiva/sclera: Conjunctivae normal.      Pupils: Pupils are equal, round, and reactive to light. Neck:      Vascular: No JVD. Trachea: No tracheal deviation. Comments: Supple. Normal range of motion. Cardiovascular:      Rate and Rhythm: Normal rate and regular rhythm. Heart sounds: Normal heart sounds. No murmur heard. No friction rub. No gallop. Comments: Normal rate and regular rhythm  Pulmonary:      Effort: Pulmonary effort is normal. No respiratory distress. Breath sounds: Normal breath sounds. No stridor. No wheezing or rales. Comments: Clear to auscultation bilaterally  Chest:      Chest wall: No tenderness. Abdominal:      General: Bowel sounds are normal. There is no distension. Palpations: Abdomen is soft. Tenderness: There is abdominal tenderness. There is no right CVA tenderness, left CVA tenderness, guarding or rebound. Comments: Soft, nontender, nondistended. Normal bowel sounds throughout   Musculoskeletal:         General: No swelling, tenderness, deformity or signs of injury. Normal range of motion. Cervical back: Normal range of motion and neck supple. No rigidity or tenderness. No muscular tenderness. Right lower leg: No edema.       Left lower leg: No edema. Comments: No neck or back tenderness including midline. Ranging all extremities without pain or difficulty. Mild diffuse left foot tenderness. 2+ DP PT pulses. Lymphadenopathy:      Cervical: No cervical adenopathy. Skin:     General: Skin is warm and dry. Coloration: Skin is not pale. Findings: No erythema or rash. Neurological:      General: No focal deficit present. Mental Status: She is alert. Mental status is at baseline. Sensory: No sensory deficit. Motor: No weakness or abnormal muscle tone. Coordination: Coordination normal.      Gait: Gait normal.      Comments: Alert. Strength and sensation grossly intact. Ambulatory without difficulty at baseline. Psychiatric:         Behavior: Behavior normal.         Thought Content:  Thought content normal.         Vital Signs  ED Triage Vitals [08/14/23 1545]   Temperature Pulse Respirations Blood Pressure SpO2   98.5 °F (36.9 °C) 59 20 108/50 94 %      Temp Source Heart Rate Source Patient Position - Orthostatic VS BP Location FiO2 (%)   Oral Monitor Sitting Right arm --      Pain Score       10 - Worst Possible Pain           Vitals:    08/15/23 0700 08/15/23 0806 08/15/23 1522 08/15/23 1535   BP: (!) 109/48 109/58  101/54   Pulse: 58  69 67   Patient Position - Orthostatic VS:             Visual Acuity  Visual Acuity    Flowsheet Row Most Recent Value   L Pupil Size (mm) 2   R Pupil Size (mm) 2          ED Medications  Medications   albuterol (PROVENTIL HFA,VENTOLIN HFA) inhaler 2 puff (has no administration in time range)   acetaminophen (TYLENOL) tablet 650 mg (650 mg Oral Given 8/15/23 1752)   Diclofenac Sodium (VOLTAREN) 1 % topical gel 2 g (2 g Topical Given 8/15/23 1753)   aspirin (ECOTRIN LOW STRENGTH) EC tablet 81 mg (81 mg Oral Given 8/15/23 0804)   budesonide-formoterol (SYMBICORT) 160-4.5 mcg/act inhaler 2 puff (2 puffs Inhalation Given 8/15/23 1753)   oxybutynin (DITROPAN-XL) 24 hr tablet 10 mg (10 mg Oral Given 8/15/23 0812)   furosemide (LASIX) tablet 20 mg (has no administration in time range)   levothyroxine tablet 50 mcg (50 mcg Oral Given 8/15/23 0540)   losartan (COZAAR) tablet 25 mg (25 mg Oral Not Given 8/15/23 0812)   pantoprazole (PROTONIX) EC tablet 40 mg (40 mg Oral Given 8/15/23 1753)   sertraline (ZOLOFT) tablet 100 mg (100 mg Oral Given 8/15/23 0804)   pravastatin (PRAVACHOL) tablet 80 mg (80 mg Oral Given 8/15/23 1753)   tiZANidine (ZANAFLEX) tablet 4 mg (has no administration in time range)   heparin (porcine) subcutaneous injection 5,000 Units (5,000 Units Subcutaneous Not Given 8/15/23 1700)   lactated ringers infusion (75 mL/hr Intravenous New Bag 8/14/23 2029)   oxyCODONE (ROXICODONE) IR tablet 5 mg (5 mg Oral Given 8/15/23 0803)   lidocaine (LIDODERM) 5 % patch 1 patch (1 patch Topical Medication Applied 8/15/23 0803)   lactated ringers infusion (75 mL/hr Intravenous New Bag 8/15/23 0957)   acetaminophen (TYLENOL) tablet 975 mg (975 mg Oral Given 8/14/23 1556)   sodium chloride 0.9 % bolus 500 mL (500 mL Intravenous New Bag 8/14/23 1708)       Diagnostic Studies  Results Reviewed     Procedure Component Value Units Date/Time    Basic metabolic panel [[id number]]  (Abnormal) Collected: 08/15/23 0502    Lab Status: Final result Specimen: Blood from Hand, Left Updated: 08/15/23 0552     Sodium 135 mmol/L      Potassium 4.1 mmol/L      Chloride 105 mmol/L      CO2 23 mmol/L      ANION GAP 7 mmol/L      BUN 61 mg/dL      Creatinine 1.73 mg/dL      Glucose 132 mg/dL      Calcium 9.7 mg/dL      eGFR 26 ml/min/1.73sq m     Narrative:      [place] guidelines for Chronic Kidney Disease (CKD):   •  Stage 1 with normal or high GFR (GFR > 90 mL/min/1.73 square meters)  •  Stage 2 Mild CKD (GFR = 60-89 mL/min/1.73 square meters)  •  Stage 3A Moderate CKD (GFR = 45-59 mL/min/1.73 square meters)  •  Stage 3B Moderate CKD (GFR = 30-44 mL/min/1.73 square meters)  •  Stage 4 Severe CKD (GFR = 15-29 mL/min/1.73 square meters)  •  Stage 5 End Stage CKD (GFR <15 mL/min/1.73 square meters)  Note: GFR calculation is accurate only with a steady state creatinine    UA w Reflex to Microscopic w Reflex to Culture [[id number]]  (Abnormal) Collected: 08/15/23 0506    Lab Status: Final result Specimen: Urine, Clean Catch Updated: 08/15/23 0528     Color, UA Yellow     Clarity, UA Turbid     Specific Gravity, UA 1.019     pH, UA 5.5     Leukocytes, UA Moderate     Nitrite, UA Negative     Protein, UA 30 (1+) mg/dl      Glucose, UA Negative mg/dl      Ketones, UA Negative mg/dl      Urobilinogen, UA 2.0 mg/dl      Bilirubin, UA Negative     Occult Blood, UA Negative    CBC and differential [[id number]]  (Abnormal) Collected: 08/15/23 0502    Lab Status: Final result Specimen: Blood from Hand, Left Updated: 08/15/23 0522     WBC 14.45 Thousand/uL      RBC 3.46 Million/uL      Hemoglobin 9.6 g/dL      Hematocrit 30.9 %      MCV 89 fL      MCH 27.7 pg      MCHC 31.1 g/dL      RDW 14.6 %      MPV 9.6 fL      Platelets 307 Thousands/uL      nRBC 0 /100 WBCs      Neutrophils Relative 82 %      Immat GRANS % 1 %      Lymphocytes Relative 7 %      Monocytes Relative 8 %      Eosinophils Relative 2 %      Basophils Relative 0 %      Neutrophils Absolute 11.96 Thousands/µL      Immature Grans Absolute 0.12 Thousand/uL      Lymphocytes Absolute 1.01 Thousands/µL      Monocytes Absolute 1.09 Thousand/µL      Eosinophils Absolute 0.24 Thousand/µL      Basophils Absolute 0.03 Thousands/µL     HS Troponin I 4hr [[id number]]  (Normal) Collected: 08/14/23 2315    Lab Status: Final result Specimen: Blood from Hand, Left Updated: 08/14/23 2350     hs TnI 4hr 35 ng/L      Delta 4hr hsTnI -3 ng/L     HS Troponin I 2hr [[id number]]  (Normal) Collected: 08/14/23 1843    Lab Status: Final result Specimen: Blood from Arm, Left Updated: 08/14/23 1932     hs TnI 2hr 37 ng/L      Delta 2hr hsTnI -1 ng/L     CK [[id number]]  (Abnormal) Collected: 08/14/23 1612    Lab Status: Final result Specimen: Blood from Arm, Left Updated: 08/14/23 1706     Total  U/L     HS Troponin 0hr (reflex protocol) [[id number]]  (Normal) Collected: 08/14/23 1612    Lab Status: Final result Specimen: Blood from Arm, Left Updated: 08/14/23 1658     hs TnI 0hr 38 ng/L     Basic metabolic panel [[id number]]  (Abnormal) Collected: 08/14/23 1612    Lab Status: Final result Specimen: Blood from Arm, Left Updated: 08/14/23 1647     Sodium 133 mmol/L      Potassium 4.6 mmol/L      Chloride 102 mmol/L      CO2 22 mmol/L      ANION GAP 9 mmol/L      BUN 64 mg/dL      Creatinine 2.26 mg/dL      Glucose 118 mg/dL      Calcium 9.8 mg/dL      eGFR 19 ml/min/1.73sq m     Narrative:      [facility] guidelines for Chronic Kidney Disease (CKD):   •  Stage 1 with normal or high GFR (GFR > 90 mL/min/1.73 square meters)  •  Stage 2 Mild CKD (GFR = 60-89 mL/min/1.73 square meters)  •  Stage 3A Moderate CKD (GFR = 45-59 mL/min/1.73 square meters)  •  Stage 3B Moderate CKD (GFR = 30-44 mL/min/1.73 square meters)  •  Stage 4 Severe CKD (GFR = 15-29 mL/min/1.73 square meters)  •  Stage 5 End Stage CKD (GFR <15 mL/min/1.73 square meters)  Note: GFR calculation is accurate only with a steady state creatinine    CBC and differential [[id number]]  (Abnormal) Collected: 08/14/23 1612    Lab Status: Final result Specimen: Blood from Arm, Left Updated: 08/14/23 1624     WBC 17.44 Thousand/uL      RBC 3.75 Million/uL      Hemoglobin 10.6 g/dL      Hematocrit 33.9 %      MCV 90 fL      MCH 28.3 pg      MCHC 31.3 g/dL      RDW 14.6 %      MPV 9.4 fL      Platelets 060 Thousands/uL      nRBC 0 /100 WBCs      Neutrophils Relative 84 %      Immat GRANS % 1 %      Lymphocytes Relative 6 %      Monocytes Relative 8 %      Eosinophils Relative 1 %      Basophils Relative 0 %      Neutrophils Absolute 14.84 Thousands/µL      Immature Grans Absolute 0.13 Thousand/uL      Lymphocytes Absolute 1.01 Thousands/µL      Monocytes Absolute 1.33 Thousand/µL      Eosinophils Absolute 0.11 Thousand/µL      Basophils Absolute 0.02 Thousands/µL                  CT head without contrast   Final Result by Gisell Garcia DO (08/14 1649)      No acute intracranial abnormality. Workstation performed: [id number]         CT spine cervical without contrast   Final Result by Gisell Garcia DO (08/14 1711)      No cervical spine fracture or traumatic malalignment. Workstation performed: [id number]         XR hip/pelv 2-3 vws left   Final Result by Erickson Tomsa MD (08/14 1701)      Unremarkable appearance of left total hip arthroplasty. Workstation performed: [id number]         XR foot 3+ views LEFT   Final Result by Erickson Tomas MD (08/14 1702)      No acute osseous abnormality. Workstation performed: [id number]                    Procedures  Procedures         ED Course             HEART Risk Score    Flowsheet Row Most Recent Value   Heart Score Risk Calculator    History 0 Filed at: 08/14/2023 1700   ECG 1 Filed at: 08/14/2023 1700   Age 2 Filed at: 08/14/2023 1700   Risk Factors 2 Filed at: 08/14/2023 1700   Troponin 2 Filed at: 08/14/2023 1700   HEART Score 7 Filed at: 08/14/2023 1700                        SBIRT 22yo+    Flowsheet Row Most Recent Value   Initial Alcohol Screen: US AUDIT-C     1. How often do you have a drink containing alcohol? 0 Filed at: 08/14/2023 1547   2. How many drinks containing alcohol do you have on a typical day you are drinking? 0 Filed at: 08/14/2023 1547   3b. FEMALE Any Age, or MALE 65+: How often do you have 4 or more drinks on one occassion? 0 Filed at: 08/14/2023 1547   Audit-C Score 0 Filed at: 08/14/2023 1547   SHAWN: How many times in the past year have you. .. Used an illegal drug or used a prescription medication for non-medical reasons?  Never Filed at: 08/14/2023 1547 Medical Decision Making  79-year-old female history of hypertension, CAD, heart failure, COPD on aspirin presenting with frequent falls and pain. Reports frequent mechanical falls and general weakness. Plan for CT imaging and x-rays. Also plan for evaluation for possible causes of weakness including cardiac evaluation with EKG and troponin, basic labs, UA. Symptom management with oral pain medication. Reassess. EKG interpreted me with atrial fibrillation rate controlled with nonspecific ST abnormality. Labs interpreted by me notable for creatinine of 2.26 consistent with ERIN. Also notable for white count of 17 without clear source of infection. UA pending. Also notable for troponin of 38 with plan for delta. Imaging no acute process. Given general weakness and ambulatory dysfunction, plan for continued evaluation and management inpatient. Admitted. Amount and/or Complexity of Data Reviewed  Labs: ordered. Radiology: ordered. Risk  OTC drugs. Decision regarding hospitalization. Disposition  Final diagnoses:   ERIN (acute kidney injury) ([address])   Fall from standing   Ambulatory dysfunction   Generalized weakness     Time reflects when diagnosis was documented in both MDM as applicable and the Disposition within this note     Time User Action Codes Description Comment    8/14/2023  4:51 PM Mychal Rob Add [N17.9] ERIN (acute kidney injury) ([address])     8/14/2023  7:54 PM Kylelye Deapham Add [W19. UQHH] Fall, initial encounter     8/15/2023  8:10 PM Mychal Rob Add [J35. XXXA] Fall from standing     8/15/2023  8:10 PM Mychal Rob Modify [N17.9] ERIN (acute kidney injury) ([address])     8/15/2023  8:10 PM Mychal Rob Modify [Q32. XXXA] Fall, initial encounter     8/15/2023  8:10 PM Mychal Rob Add [R26.2] Ambulatory dysfunction     8/15/2023  8:10 PM Mychal Rob Add [R53.1] Generalized weakness       ED Disposition     ED Disposition   Admit    Condition   Stable    Date/Time   Mon Aug 14, 2023  7:34 PM    Comment   Case was discussed with MONSTER and the patient's admission status was agreed to be Admission Status: inpatient status to the service of Dr. Wilner Santamaria . Follow-up Information    None         Current Discharge Medication List      CONTINUE these medications which have NOT CHANGED    Details   acetaminophen (TYLENOL) 500 mg tablet Take 500 mg by mouth every 6 (six) hours as needed      albuterol (2.5 mg/3 mL) 0.083 % nebulizer solution Take 3 mL (2.5 mg total) by nebulization every 6 (six) hours as needed for wheezing or shortness of breath  Qty: 360 mL, Refills: 5    Associated Diagnoses: Mild intermittent asthma without complication      albuterol (PROVENTIL HFA,VENTOLIN HFA) 90 mcg/act inhaler Inhale 2 puffs every 6 (six) hours as needed for wheezing  Qty: 1 Inhaler, Refills: 3    Comments: Substitution to a formulary equivalent within the same pharmaceutical class is authorized. Associated Diagnoses: Simple chronic bronchitis (HCC)      Ascorbic Acid, Vitamin C, (VITAMIN C) 100 MG tablet Take 100 mg by mouth daily      aspirin (ECOTRIN LOW STRENGTH) 81 mg EC tablet Take 1 tablet (81 mg total) by mouth daily  Qty: 100 tablet, Refills: 0    Associated Diagnoses: S/P TAVR (transcatheter aortic valve replacement); Aortic stenosis, severe      b complex vitamins capsule Take 1 capsule by mouth 2 (two) times a day        budesonide-formoterol (Symbicort) 160-4.5 mcg/act inhaler Inhale 2 puffs 2 (two) times a day  Qty: 10.2 g, Refills: 3    Associated Diagnoses: Chronic obstructive pulmonary disease, unspecified COPD type ([address]);  Chronic hypoxemic respiratory failure (HCC)      Calcium Carb-Cholecalciferol (CALCIUM 600 + D PO) Take 1 tablet by mouth 2 (two) times a day      Calcium Carb-Cholecalciferol 600-10 MG-MCG TABS Take 1 tablet by mouth      ciprofloxacin (CILOXAN) 0.3 % ophthalmic ointment Administer into the left eye 3 (three) times a day  Qty: 3.5 g, Refills: 0    Associated Diagnoses: Otitis externa of left ear, unspecified chronicity, unspecified type      Cranberry 1000 MG CAPS Take by mouth      Cranberry 250 MG TABS Take by mouth      Fesoterodine Fumarate ER (Toviaz) 8 MG TB24 Take 8 mg by mouth daily       furosemide (LASIX) 20 mg tablet Take 1 tablet (20 mg total) by mouth daily as needed (wt gain) In the morning  Qty: 90 tablet, Refills: 3    Associated Diagnoses: Acute exacerbation of CHF (congestive heart failure) ([facility])      ibuprofen (MOTRIN) 200 mg tablet Take 200 mg by mouth every 6 (six) hours as needed      levothyroxine 50 mcg tablet TAKE 1 TABLET BY MOUTH EVERY DAY  Qty: 90 tablet, Refills: 1    Associated Diagnoses: Acquired hypothyroidism      Myrbetriq 50 MG TB24 Take 1 tablet by mouth daily      olmesartan (BENICAR) 5 mg tablet TAKE 2 TABLETS BY MOUTH EVERY DAY  Qty: 180 tablet, Refills: 3    Associated Diagnoses: Essential hypertension      omeprazole (PriLOSEC) 40 MG capsule TAKE 1 CAPSULE BY MOUTH TWICE A DAY  Qty: 180 capsule, Refills: 1    Associated Diagnoses: Gastroesophageal reflux disease with esophagitis      oxyCODONE (ROXICODONE) 5 immediate release tablet Take 1 tablet (5 mg total) by mouth every 4 (four) hours as needed for moderate pain Max Daily Amount: 30 mg  Qty: 20 tablet, Refills: 0    Associated Diagnoses: Acute pain of left shoulder      sertraline (ZOLOFT) 100 mg tablet TAKE 1 TABLET BY MOUTH EVERY DAY  Qty: 90 tablet, Refills: 0    Associated Diagnoses: Depression with anxiety      simvastatin (ZOCOR) 40 mg tablet TAKE 1 TABLET BY MOUTH EVERYDAY AT BEDTIME  Qty: 90 tablet, Refills: 1    Associated Diagnoses: Hyperlipidemia, unspecified hyperlipidemia type             No discharge procedures on file.     PDMP Review       Value Time User    PDMP Reviewed  Yes 8/11/2023  2:48 PM MABEL Meléndez          ED Provider  Electronically Signed by           Dawn Rose MD  08/15/23 2010

## 2023-08-14 NOTE — H&P
[address]  H&P  Name: Guillermo Damon 79 y.o. female I MRN: [id number]  Unit/Bed#: FT 02 I Date of Admission: 8/14/2023   Date of Service: 8/14/2023 I Hospital Day: 0      Assessment/Plan   * Fall  Assessment & Plan  Patient reports experiencing multiple falls over the past week. She feels that her mechanical and have occurred due to her feeling generally weak. Denies head strike or LOC, and reports that after each fall she was able to lift herself off the ground without assistance. Currently has complaints of some left foot, left shoulder (chronic, being worked up as outpatient), and neck pain, prompting ED visit. Denies other acute symptom/complaint at this time.     /50   Pulse 63   Temp 98.5 °F (36.9 °C) (Oral)   Resp 20   SpO2 96%     · Multiple falls over the past week; reportedly mechanical 2/2 generalized weakness  · Has complaints of left foot and neck pain  · Denies head strike, LOC, or prodromal symptom  · Patient able to lift herself from the ground   · CT trauma workup negative for acute traumatic injuries   · PT/OT and case management consulted  · Supportive care and PRN pain control   · Fall precautions     Acute-on-chronic kidney injury [facility])  Assessment & Plan  Lab Results   Component Value Date    EGFR 19 08/14/2023    EGFR 45 07/11/2023    EGFR 72 06/28/2023    CREATININE 2.26 (H) 08/14/2023    CREATININE 1.13 07/11/2023    CREATININE 0.76 06/28/2023     · Creatinine 2.26; baseline around 1.1   · Patient reports poor oral intake; suspecting prerenal dehydration as cause  · Denies flank/abd pain   · Gentle IVF hydration overnight  · Avoid nephrotoxic agents   · AM BMP    Leukocytosis  Assessment & Plan  · WBC 17.44; not currently meeting SIRS criteria  · Unsure of cause currently  · Denies infectious S/S  · UA pending collection; follow-up with results  · Monitor for infectious S/S development  · AM CBC repeat ordered    JANICE on CPAP  Assessment & Plan  · qHS CPAP ordered     Coronary artery disease  Assessment & Plan  · Denies chest pain  · Continue home daily ASA and statin    COPD, mild (HCC)  Assessment & Plan  · Does not appear to be in acute exacerbation  · Continue prehosptial inhalers   · Monitor respiratory status     AVB (atrioventricular block)  Assessment & Plan  · 1st degree AV block noted   · EKG today suggestive of such a block vs afib w/aberrant conduction? · On physical exam patient appears to be in a regular rhytm, with some pac's noted on monitor  · Would consider discussion with cardio in AM vs outpatient f/u given patient CHADVASC 4     Anemia  Assessment & Plan  · HGB 10.6; was 11.9 on 07/11/2023  · Denies s/s of bleeding; monitor for development  · AM CBC ordered     Chronic diastolic (congestive) heart failure (HCC)  Assessment & Plan  Wt Readings from Last 3 Encounters:   08/11/23 83.9 kg (185 lb)   04/13/23 85.1 kg (187 lb 9.6 oz)   03/22/23 83.5 kg (184 lb)     · Appears euvolemic on admission   · 06/2022 ECHO EF 60%  · Continue home PRN PO lasix   · Monitor for s/s of volume retention         Hypertension  Assessment & Plan  · /50  · Continue home olmesartan (equivalent) and prn PO lasix   · Monitor BP per unit protocol     Hypothyroid  Assessment & Plan  · Continue home levothyroxine     VTE Pharmacologic Prophylaxis: VTE Score: 5 High Risk (Score >/= 5) - Pharmacological DVT Prophylaxis Ordered: heparin drip. Sequential Compression Devices Ordered. Code Status: Level 3 - DNAR and DNI   Discussion with family: Updated  (Friend Kuldeep Elder)) via phone. Anticipated Length of Stay: Patient will be admitted on an inpatient basis with an anticipated length of stay of greater than 2 midnights secondary to Frequent falls . Chief Complaint: frequent falls     History of Present Illness:  Joshua Macedo is a 79 y.o. female with a PMH of CHF, CAD, CKD, COPD, hypothyroidism, JANICE, and HTN who presents with frequent falls.  Patient reports experiencing multiple falls over the past week. She feels that her mechanical and have occurred due to her feeling generally weak. Denies head strike or LOC, and reports that after each fall she was able to lift herself off the ground without assistance. Currently has complaints of some left foot, left shoulder (chronic, being worked up as outpatient), and neck pain, prompting ED visit. Denies other acute symptom/complaint at this time. All questions answered at the bedside to the best of my ability. Review of Systems:  Review of Systems   Constitutional: Negative for activity change, appetite change, chills, diaphoresis, fatigue and fever. HENT: Negative for congestion, ear pain, nosebleeds and trouble swallowing. Eyes: Negative for pain and visual disturbance. Respiratory: Negative for apnea, cough, chest tightness, shortness of breath and wheezing. Cardiovascular: Negative for chest pain, palpitations and leg swelling. Gastrointestinal: Negative for abdominal distention, abdominal pain, blood in stool, constipation, diarrhea, nausea and vomiting. Endocrine: Negative for cold intolerance, heat intolerance and polyuria. Genitourinary: Negative for difficulty urinating, dysuria, flank pain and hematuria. Musculoskeletal: Positive for gait problem (frequent falls ) and neck pain. Negative for arthralgias and neck stiffness. +Lef foot pain (dorsum)   Skin: Negative for color change, rash and wound. Neurological: Negative for dizziness, tremors, syncope, weakness, light-headedness, numbness and headaches. Hematological: Negative for adenopathy. All other systems reviewed and are negative.       Past Medical and Surgical History:   Past Medical History:   Diagnosis Date   • Anemia 08/22/2018   • Anxiety    • Arthritis    • AVB (atrioventricular block)     first degree   • Cataract    • CHF (congestive heart failure) (HCC)    • COPD, mild (HCC)    • Coronary artery disease    • Dislocation of right shoulder joint    • Frequent UTI    • GERD (gastroesophageal reflux disease)    • H/O: pneumonia    • Heme positive stool    • Hyperlipidemia    • Hypertension    • Hypothyroidism    • Morbid obesity with BMI of 50.0-59.9, adult ([address])    • Obesity, morbid ([address]) 08/22/2018   • JANICE on CPAP    • Pulmonary hypertension ([address]) 08/22/2018   • Severe aortic stenosis    • Simple goiter    • Skin cyst     within the armpits, right   • Wears glasses        Past Surgical History:   Procedure Laterality Date   • BREAST BIOPSY     • CARDIAC CATHETERIZATION     • CARPAL TUNNEL RELEASE Bilateral    • CHOLECYSTECTOMY     • DILATION AND CURETTAGE OF UTERUS     • HYSTEROSCOPY     • MASTOID SURGERY     • UT COLONOSCOPY FLX DX W/COLLJ SPEC WHEN PFRMD N/A 9/6/2018    Procedure: COLONOSCOPY;  Surgeon: Aurelia Villagran MD;  Location: [facility]; Service: Gastroenterology   • UT ECHO TRANSESOPHAG R-T 2D W/PRB IMG ACQUISJ I&R N/A 10/9/2018    Procedure: INTRA-OP TRANSESOPHAGEAL ECHOCARDIOGRAM (GARRISON); Surgeon: Felecia Kwok DO;  Location: [facility];  Service: Cardiac Surgery   • UT ESOPHAGOGASTRODUODENOSCOPY TRANSORAL DIAGNOSTIC N/A 8/31/2018    Procedure: ESOPHAGOGASTRODUODENOSCOPY (EGD); Surgeon: Aurelia Villagran MD;  Location: [facility]; Service: Gastroenterology   • UT REPLACE AORTIC VALVE OPENFEMORAL ARTERY APPROACH N/A 10/9/2018    Procedure: REPLACEMENT AORTIC VALVE TRANSCATHETER (TAVR) TRANSFEMORAL W/ 23 MM MENDOZA NOE S3 VALVE (ACCESS OF LEFT); Surgeon: Felecia Kwok DO;  Location[facility];  Service: Cardiac Surgery   • TOTAL HIP ARTHROPLASTY Left 2007   • TOTAL KNEE ARTHROPLASTY Bilateral        Meds/Allergies:  Prior to Admission medications    Medication Sig Start Date End Date Taking?  Authorizing Provider   acetaminophen (TYLENOL) 500 mg tablet Take 500 mg by mouth every 6 (six) hours as needed    Historical Provider, MD   albuterol (2.5 mg/3 mL) 0.083 % nebulizer solution Take 3 mL (2.5 mg total) by nebulization every 6 (six) hours as needed for wheezing or shortness of breath  Patient taking differently: Take 2.5 mg by nebulization every 6 (six) hours as needed for wheezing or shortness of breath PRN 4/4/22   Joan Greene PA-C   albuterol (PROVENTIL HFA,VENTOLIN HFA) 90 mcg/act inhaler Inhale 2 puffs every 6 (six) hours as needed for wheezing 6/16/20   MABEL Torres   Ascorbic Acid, Vitamin C, (VITAMIN C) 100 MG tablet Take 100 mg by mouth daily    Historical Provider, MD   aspirin (ECOTRIN LOW STRENGTH) 81 mg EC tablet Take 1 tablet (81 mg total) by mouth daily 10/11/18   Fredis Oscar PA-C   b complex vitamins capsule Take 1 capsule by mouth 2 (two) times a day      Historical Provider, MD   budesonide-formoterol (Symbicort) 160-4.5 mcg/act inhaler Inhale 2 puffs 2 (two) times a day 7/29/22   Joan Greene PA-C   Calcium Carb-Cholecalciferol (CALCIUM 600 + D PO) Take 1 tablet by mouth 2 (two) times a day    Historical Provider, MD   Calcium Carb-Cholecalciferol 600-10 MG-MCG TABS Take 1 tablet by mouth    Historical Provider, MD   ciprofloxacin (CILOXAN) 0.3 % ophthalmic ointment Administer into the left eye 3 (three) times a day 7/11/23   MABEL Hallman   Cranberry 1000 MG CAPS Take by mouth    Historical Provider, MD   Cranberry 250 MG TABS Take by mouth    Historical Provider, MD   Fesoterodine Fumarate ER (Toviaz) 8 MG TB24 Take 8 mg by mouth daily     Historical Provider, MD   furosemide (LASIX) 20 mg tablet Take 1 tablet (20 mg total) by mouth daily as needed (wt gain) In the morning 1/16/23 2/15/23  Michael Lowery MD   ibuprofen (MOTRIN) 200 mg tablet Take 200 mg by mouth every 6 (six) hours as needed    Historical Provider, MD   levothyroxine 50 mcg tablet TAKE 1 TABLET BY MOUTH EVERY DAY 5/3/23   MABEL Hallman   Myrbetriq 50 MG TB24 Take 1 tablet by mouth daily 5/31/23   Historical Provider, MD   olmesartan (BENICAR) 5 mg tablet TAKE 2 TABLETS BY MOUTH EVERY DAY 5/3/23   Natalya Cotto MD   omeprazole (PriLOSEC) 40 MG capsule TAKE 1 CAPSULE BY MOUTH TWICE A DAY 5/3/23   MABEL Hallman   oxyCODONE (ROXICODONE) 5 immediate release tablet Take 1 tablet (5 mg total) by mouth every 4 (four) hours as needed for moderate pain Max Daily Amount: 30 mg 8/11/23   MABEL Hallman   sertraline (ZOLOFT) 100 mg tablet TAKE 1 TABLET BY MOUTH EVERY DAY 7/31/23   MABEL Hallman   simvastatin (ZOCOR) 40 mg tablet TAKE 1 TABLET BY MOUTH EVERYDAY AT BEDTIME 6/23/23   MABEL Huerta   cyclobenzaprine (FLEXERIL) 5 mg tablet Take 1 tablet (5 mg total) by mouth 2 (two) times a day as needed for muscle spasms 8/30/22 9/5/22  MABEL Hallman   meloxicam (Mobic) 15 mg tablet Take 1 tablet (15 mg total) by mouth daily 8/30/22 9/5/22  MABEL Hallman     I have reviewed home medications with patient personally. Allergies:    Allergies   Allergen Reactions   • Latex Rash   • Neosporin [Neomycin-Bacitracin Zn-Polymyx] Rash and Other (See Comments)     hives per [facility] order       Social History:  Marital Status: Single   Occupation: N/A  Patient Pre-hospital Living Situation: Home  Patient Pre-hospital Level of Mobility: walks  Patient Pre-hospital Diet Restrictions: None  Substance Use History:   Social History     Substance and Sexual Activity   Alcohol Use No     Social History     Tobacco Use   Smoking Status Never   Smokeless Tobacco Never     Social History     Substance and Sexual Activity   Drug Use No       Family History:  Family History   Problem Relation Age of Onset   • Diabetes Mother    • Stroke Mother    • Cancer Father    • Lung cancer Father    • Diabetes Sister    • Heart disease Sister    • Hypertension Sister    • Coronary artery disease Family    • Diabetes Family    • Hypertension Family    • Cancer Family    • Stroke Family    • Thyroid disease Neg Hx        Physical Exam:     Vitals:   Blood Pressure: 101/50 (08/14/23 1900)  Pulse: 63 (08/14/23 1900)  Temperature: 98.5 °F (36.9 °C) (08/14/23 1545)  Temp Source: Oral (08/14/23 1545)  Respirations: 20 (08/14/23 1900)  SpO2: 96 % (08/14/23 1900)    Physical Exam  Vitals and nursing note reviewed. Constitutional:       General: She is not in acute distress. Appearance: Normal appearance. HENT:      Head: Normocephalic and atraumatic. Right Ear: External ear normal.      Left Ear: External ear normal.      Nose: Nose normal.      Mouth/Throat:      Mouth: Mucous membranes are moist.   Eyes:      Pupils: Pupils are equal, round, and reactive to light. Cardiovascular:      Rate and Rhythm: Normal rate and regular rhythm. Pulses: Normal pulses. Heart sounds: Normal heart sounds. No murmur heard. Pulmonary:      Effort: Pulmonary effort is normal. No respiratory distress. Breath sounds: Normal breath sounds. No wheezing or rales. Chest:      Chest wall: No tenderness. Abdominal:      General: Bowel sounds are normal. There is no distension. Palpations: Abdomen is soft. There is no mass. Tenderness: There is no abdominal tenderness. There is no guarding. Musculoskeletal:         General: No swelling or tenderness. Cervical back: Normal range of motion and neck supple. No rigidity or tenderness. Right lower leg: No edema. Left lower leg: No edema. Skin:     General: Skin is warm and dry. Capillary Refill: Capillary refill takes less than 2 seconds. Findings: No lesion or rash. Neurological:      General: No focal deficit present. Mental Status: She is alert.    Psychiatric:         Mood and Affect: Mood normal.          Additional Data:     Lab Results:  Results from last 7 days   Lab Units 08/14/23  1612   WBC Thousand/uL 17.44*   HEMOGLOBIN g/dL 10.6*   HEMATOCRIT % 33.9*   PLATELETS Thousands/uL 322   NEUTROS PCT % 84*   LYMPHS PCT % 6*   MONOS PCT % 8   EOS PCT % 1     Results from last 7 days   Lab Units 08/14/23  1612   SODIUM mmol/L 133*   POTASSIUM mmol/L 4.6   CHLORIDE mmol/L 102   CO2 mmol/L 22   BUN mg/dL 64*   CREATININE mg/dL 2.26*   ANION GAP mmol/L 9   CALCIUM mg/dL 9.8   GLUCOSE RANDOM mg/dL 118                       Lines/Drains:  Invasive Devices     Peripheral Intravenous Line  Duration           Peripheral IV 08/14/23 Left Antecubital <1 day                    Imaging: Reviewed radiology reports from this admission including: CT head, xray(s) and CT c-spine  CT head without contrast   Final Result by Norah Marks DO (08/14 1649)      No acute intracranial abnormality. Workstation performed: [id number]         CT spine cervical without contrast   Final Result by Norah Marks DO (08/14 1711)      No cervical spine fracture or traumatic malalignment. Workstation performed: [id number]         XR hip/pelv 2-3 vws left   Final Result by Rasheeda Pablo MD (08/14 1701)      Unremarkable appearance of left total hip arthroplasty. Workstation performed: [id number]         XR foot 3+ views LEFT   Final Result by Rasheeda Pablo MD (08/14 1702)      No acute osseous abnormality. Workstation performed: [id number]             EKG and Other Studies Reviewed on Admission:   · EKG: EKG AV block vs afib w/ aberrant conductions . ** Please Note: This note has been constructed using a voice recognition system.  **

## 2023-08-14 NOTE — ED NOTES
Anthony Banks off edge of side walk - yesterday  Right ankle swollen and painful   Can you send an xray order to [facility]   She wants to make sure she did not break it Called Trauma eval to #[id number]      Nabeel Chong  08/14/23 1559

## 2023-08-14 NOTE — ASSESSMENT & PLAN NOTE
· Does not appear to be in acute exacerbation  · Continue prehosptial inhalers   · Monitor respiratory status

## 2023-08-14 NOTE — ASSESSMENT & PLAN NOTE
· WBC 17.44; not currently meeting SIRS criteria  · Unsure of cause currently  · Denies infectious S/S  · UA pending collection; follow-up with results  · Monitor for infectious S/S development  · AM CBC repeat ordered

## 2023-08-14 NOTE — ASSESSMENT & PLAN NOTE
· 1st degree AV block noted   · EKG today suggestive of such a block vs afib w/aberrant conduction?   · On physical exam patient appears to be in a regular rhytm, with some pac's noted on monitor  · Would consider discussion with cardio in AM vs outpatient f/u given patient CHADVASC 4

## 2023-08-15 PROBLEM — D50.8 IRON DEFICIENCY ANEMIA SECONDARY TO INADEQUATE DIETARY IRON INTAKE: Status: ACTIVE | Noted: 2018-08-22

## 2023-08-15 LAB
ANION GAP SERPL CALCULATED.3IONS-SCNC: 7 MMOL/L
BACTERIA UR QL AUTO: ABNORMAL /HPF
BASOPHILS # BLD AUTO: 0.03 THOUSANDS/ÂΜL (ref 0–0.1)
BASOPHILS NFR BLD AUTO: 0 % (ref 0–1)
BILIRUB UR QL STRIP: NEGATIVE
BUN SERPL-MCNC: 61 MG/DL (ref 5–25)
CALCIUM SERPL-MCNC: 9.7 MG/DL (ref 8.4–10.2)
CHLORIDE SERPL-SCNC: 105 MMOL/L (ref 96–108)
CLARITY UR: ABNORMAL
CO2 SERPL-SCNC: 23 MMOL/L (ref 21–32)
COLOR UR: YELLOW
CREAT SERPL-MCNC: 1.73 MG/DL (ref 0.6–1.3)
EOSINOPHIL # BLD AUTO: 0.24 THOUSAND/ÂΜL (ref 0–0.61)
EOSINOPHIL NFR BLD AUTO: 2 % (ref 0–6)
ERYTHROCYTE [DISTWIDTH] IN BLOOD BY AUTOMATED COUNT: 14.6 % (ref 11.6–15.1)
GFR SERPL CREATININE-BSD FRML MDRD: 26 ML/MIN/1.73SQ M
GLUCOSE SERPL-MCNC: 132 MG/DL (ref 65–140)
GLUCOSE UR STRIP-MCNC: NEGATIVE MG/DL
HCT VFR BLD AUTO: 30.9 % (ref 34.8–46.1)
HGB BLD-MCNC: 9.6 G/DL (ref 11.5–15.4)
HGB UR QL STRIP.AUTO: NEGATIVE
HYALINE CASTS #/AREA URNS LPF: ABNORMAL /LPF
IMM GRANULOCYTES # BLD AUTO: 0.12 THOUSAND/UL (ref 0–0.2)
IMM GRANULOCYTES NFR BLD AUTO: 1 % (ref 0–2)
KETONES UR STRIP-MCNC: NEGATIVE MG/DL
LEUKOCYTE ESTERASE UR QL STRIP: ABNORMAL
LYMPHOCYTES # BLD AUTO: 1.01 THOUSANDS/ÂΜL (ref 0.6–4.47)
LYMPHOCYTES NFR BLD AUTO: 7 % (ref 14–44)
MCH RBC QN AUTO: 27.7 PG (ref 26.8–34.3)
MCHC RBC AUTO-ENTMCNC: 31.1 G/DL (ref 31.4–37.4)
MCV RBC AUTO: 89 FL (ref 82–98)
MONOCYTES # BLD AUTO: 1.09 THOUSAND/ÂΜL (ref 0.17–1.22)
MONOCYTES NFR BLD AUTO: 8 % (ref 4–12)
NEUTROPHILS # BLD AUTO: 11.96 THOUSANDS/ÂΜL (ref 1.85–7.62)
NEUTS SEG NFR BLD AUTO: 82 % (ref 43–75)
NITRITE UR QL STRIP: NEGATIVE
NON-SQ EPI CELLS URNS QL MICRO: ABNORMAL /HPF
NRBC BLD AUTO-RTO: 0 /100 WBCS
PH UR STRIP.AUTO: 5.5 [PH]
PLATELET # BLD AUTO: 295 THOUSANDS/UL (ref 149–390)
PMV BLD AUTO: 9.6 FL (ref 8.9–12.7)
POTASSIUM SERPL-SCNC: 4.1 MMOL/L (ref 3.5–5.3)
PROT UR STRIP-MCNC: ABNORMAL MG/DL
RBC # BLD AUTO: 3.46 MILLION/UL (ref 3.81–5.12)
RBC #/AREA URNS AUTO: ABNORMAL /HPF
SODIUM SERPL-SCNC: 135 MMOL/L (ref 135–147)
SP GR UR STRIP.AUTO: 1.02 (ref 1–1.03)
UROBILINOGEN UR STRIP-ACNC: 2 MG/DL
WBC # BLD AUTO: 14.45 THOUSAND/UL (ref 4.31–10.16)
WBC #/AREA URNS AUTO: ABNORMAL /HPF

## 2023-08-15 PROCEDURE — 97530 THERAPEUTIC ACTIVITIES: CPT

## 2023-08-15 PROCEDURE — 94660 CPAP INITIATION&MGMT: CPT

## 2023-08-15 PROCEDURE — 82728 ASSAY OF FERRITIN: CPT | Performed by: INTERNAL MEDICINE

## 2023-08-15 PROCEDURE — 80048 BASIC METABOLIC PNL TOTAL CA: CPT

## 2023-08-15 PROCEDURE — 99233 SBSQ HOSP IP/OBS HIGH 50: CPT | Performed by: INTERNAL MEDICINE

## 2023-08-15 PROCEDURE — 94760 N-INVAS EAR/PLS OXIMETRY 1: CPT

## 2023-08-15 PROCEDURE — 94664 DEMO&/EVAL PT USE INHALER: CPT

## 2023-08-15 PROCEDURE — 85025 COMPLETE CBC W/AUTO DIFF WBC: CPT

## 2023-08-15 PROCEDURE — 97163 PT EVAL HIGH COMPLEX 45 MIN: CPT

## 2023-08-15 PROCEDURE — 81001 URINALYSIS AUTO W/SCOPE: CPT | Performed by: EMERGENCY MEDICINE

## 2023-08-15 PROCEDURE — 83550 IRON BINDING TEST: CPT | Performed by: INTERNAL MEDICINE

## 2023-08-15 PROCEDURE — 83540 ASSAY OF IRON: CPT | Performed by: INTERNAL MEDICINE

## 2023-08-15 RX ORDER — SODIUM CHLORIDE, SODIUM LACTATE, POTASSIUM CHLORIDE, CALCIUM CHLORIDE 600; 310; 30; 20 MG/100ML; MG/100ML; MG/100ML; MG/100ML
75 INJECTION, SOLUTION INTRAVENOUS CONTINUOUS
Status: DISPENSED | OUTPATIENT
Start: 2023-08-15 | End: 2023-08-15

## 2023-08-15 RX ADMIN — HEPARIN SODIUM 5000 UNITS: 5000 INJECTION INTRAVENOUS; SUBCUTANEOUS at 05:40

## 2023-08-15 RX ADMIN — PANTOPRAZOLE SODIUM 40 MG: 40 TABLET, DELAYED RELEASE ORAL at 08:04

## 2023-08-15 RX ADMIN — OXYCODONE HYDROCHLORIDE 5 MG: 5 TABLET ORAL at 20:35

## 2023-08-15 RX ADMIN — OXYBUTYNIN CHLORIDE 10 MG: 5 TABLET, EXTENDED RELEASE ORAL at 08:12

## 2023-08-15 RX ADMIN — SERTRALINE HYDROCHLORIDE 100 MG: 100 TABLET ORAL at 08:04

## 2023-08-15 RX ADMIN — LEVOTHYROXINE SODIUM 50 MCG: 0.05 TABLET ORAL at 05:40

## 2023-08-15 RX ADMIN — ASPIRIN 81 MG: 81 TABLET, COATED ORAL at 08:04

## 2023-08-15 RX ADMIN — LIDOCAINE 5% 1 PATCH: 700 PATCH TOPICAL at 08:03

## 2023-08-15 RX ADMIN — HEPARIN SODIUM 5000 UNITS: 5000 INJECTION INTRAVENOUS; SUBCUTANEOUS at 21:14

## 2023-08-15 RX ADMIN — PRAVASTATIN SODIUM 80 MG: 80 TABLET ORAL at 17:53

## 2023-08-15 RX ADMIN — Medication 2 G: at 08:12

## 2023-08-15 RX ADMIN — Medication 2 G: at 21:14

## 2023-08-15 RX ADMIN — PANTOPRAZOLE SODIUM 40 MG: 40 TABLET, DELAYED RELEASE ORAL at 17:53

## 2023-08-15 RX ADMIN — SODIUM CHLORIDE, SODIUM LACTATE, POTASSIUM CHLORIDE, AND CALCIUM CHLORIDE 75 ML/HR: .6; .31; .03; .02 INJECTION, SOLUTION INTRAVENOUS at 09:57

## 2023-08-15 RX ADMIN — ACETAMINOPHEN 650 MG: 325 TABLET, FILM COATED ORAL at 17:52

## 2023-08-15 RX ADMIN — OXYCODONE HYDROCHLORIDE 5 MG: 5 TABLET ORAL at 08:03

## 2023-08-15 RX ADMIN — BUDESONIDE AND FORMOTEROL FUMARATE DIHYDRATE 2 PUFF: 160; 4.5 AEROSOL RESPIRATORY (INHALATION) at 17:53

## 2023-08-15 RX ADMIN — Medication 2 G: at 12:18

## 2023-08-15 RX ADMIN — Medication 2 G: at 17:53

## 2023-08-15 RX ADMIN — BUDESONIDE AND FORMOTEROL FUMARATE DIHYDRATE 2 PUFF: 160; 4.5 AEROSOL RESPIRATORY (INHALATION) at 08:05

## 2023-08-15 NOTE — MALNUTRITION/BMI
This medical record reflects one or more clinical indicators suggestive of  morbid obesity. BMI Findings:  Adult BMI Classifications: Morbid Obesity 40-44.9        Body mass index is 44.07 kg/m². See Nutrition note dated 8/15/23 for additional details. Completed nutrition assessment is viewable in the nutrition documentation.

## 2023-08-15 NOTE — RESPIRATORY THERAPY NOTE
08/14/23 2151   Respiratory Assessment   Assessment Type Assess only   General Appearance Drowsy   Respiratory Pattern Normal   Chest Assessment Chest expansion symmetrical   Bilateral Breath Sounds Diminished   R Breath Sounds Diminished   L Breath Sounds Diminished   Location Specific No   Cough None   Resp Comments Pt placed on cpap at this time   O2 Device V30   Non-Invasive Information   O2 Interface Device Face mask   Non-Invasive Ventilation Mode CPAP   $ Intermittent NIV Yes   SpO2 94 %   $ Pulse Oximetry Spot Check Charge Completed   Non-Invasive Settings   FiO2 (%) 28   PEEP/CPAP (cm H2O) 8   Rise Time 2   Non-Invasive Readings   Skin Intervention Skin intact   Total Rate 23   Spontaneous Vt (mL) 302   Spontaneous MV (mL) 7.4   Leak (lpm) 25   Non-Invasive Alarms   Low Insp Pressure Time (sec) 20 sec   High Resp Rate (BPM) 45 BPM     RT Ventilator Management Note  Jose Campos 79 y.o. female MRN: [id number]  Unit/Bed#: -01 Encounter: [id number]      Daily Screen    No data found in the last 10 encounters.            Physical Exam:   Assessment Type: Assess only  General Appearance: Drowsy  Respiratory Pattern: Normal  Chest Assessment: Chest expansion symmetrical  Bilateral Breath Sounds: Diminished  R Breath Sounds: Diminished  L Breath Sounds: Diminished  Location Specific: No  Cough: None  O2 Device: V30      Resp Comments: Pt placed on cpap at this time

## 2023-08-15 NOTE — PLAN OF CARE
Problem: PHYSICAL THERAPY ADULT  Goal: Performs mobility at highest level of function for planned discharge setting. See evaluation for individualized goals. Description: Treatment/Interventions: Functional transfer training, LE strengthening/ROM, Therapeutic exercise, Endurance training, Patient/family training, Equipment eval/education, Bed mobility, Continued evaluation, Spoke to nursing  Equipment Recommended:  (TBD)       See flowsheet documentation for full assessment, interventions and recommendations. 8/15/2023 1334 by Leticia Chang PT  Note: Prognosis: Good  Problem List: Decreased strength, Decreased endurance, Impaired balance, Decreased mobility, Pain, Obesity  Assessment: Pt is 79 y.o. female seen for PT evaluation on 8/15/2023 s/p admit to [id number] [facility] on 8/14/2023 w/ Acute-on-chronic kidney injury ([address] PT was consulted to assess pt's functional mobility and d/c needs. Order placed for PT eval and tx. PTA, pt resides alone in [facility] with ramped entrance, ambulates with rollator vs RW; +fall history. At time of eval, pt requiring mod A for bed mobility, mod A x2 for transfers. Upon evaluation, pt presenting with impaired functional mobility d/t decreased strength, decreased endurance, impaired balance, decreased mobility, pain and activity intolerance. Pertinent PMHx and current co-morbidities affecting pt's physical performance at time of assessment include: ERIN, hypothyroid, HTN, CHF, anemia, AVB, COPD, CAD, JANICE on CPAP, fall, leukocytosis, HLD, JANICE, arthritis, s/p TAVR, depression with anxiety, osteopenia, radiculopathy, urinary incontinence. Personal factors affecting pt at time of eval include: inaccessible home environment, inability to ambulate household distances, inability to navigate level surfaces w/o external assistance, limited home support and positive fall history.  The following objective measures performed on IE also reveal limitations: Barthel Index: 30/100, Modified Geismar: 4 (moderate/severe disability) and AM-PAC 6-Clicks: 1/63. Pt's clinical presentation is currently unstable/unpredictable seen in pt's presentation of advanced age, abnormal lab value(s), need for input for task focus and mobility technique and ongoing medical assessment. Overall, pt's rehab potential and prognosis to return to PLOF is good as impacted by objective findings, warranting pt to receive further skilled PT interventions to address identified impairments, activity limitation(s), and participation restriction(s). Pt to benefit from continued PT tx to address deficits as defined above and maximize level of functional independent mobility. From PT/mobility standpoint, recommendation at time of d/c would be post acute rehabilitation services pending progress in order to facilitate return to PLOF. Barriers to Discharge: Inaccessible home environment, Decreased caregiver support     PT Discharge Recommendation: Post acute rehabilitation services    See flowsheet documentation for full assessment. 8/15/2023 1334 by Nik Velásquez PT  Note: Prognosis: Good  Problem List: Decreased strength, Decreased endurance, Impaired balance, Decreased mobility, Pain, Obesity  Assessment: Pt is 79 y.o. female seen for PT evaluation on 8/15/2023 s/p admit to [facility] on 8/14/2023 w/ Acute-on-chronic kidney injury ([address]). PT was consulted to assess pt's functional mobility and d/c needs. Order placed for PT eval and tx. PTA, pt resides alone in [facility] with ramped entrance, ambulates with rollator vs RW; +fall history. At time of eval, pt requiring mod A for bed mobility, mod A x2 for transfers. Upon evaluation, pt presenting with impaired functional mobility d/t decreased strength, decreased endurance, impaired balance, decreased mobility, pain and activity intolerance.  Pertinent PMHx and current co-morbidities affecting pt's physical performance at time of assessment include: ERIN, hypothyroid, HTN, CHF, anemia, AVB, COPD, CAD, JANICE on CPAP, fall, leukocytosis, HLD, JANICE, arthritis, s/p TAVR, depression with anxiety, osteopenia, radiculopathy, urinary incontinence. Personal factors affecting pt at time of eval include: inaccessible home environment, inability to ambulate household distances, inability to navigate level surfaces w/o external assistance, limited home support and positive fall history. The following objective measures performed on IE also reveal limitations: Barthel Index: 30/100, Modified Reagan: 4 (moderate/severe disability) and AM-PAC 6-Clicks: 3/85. Pt's clinical presentation is currently unstable/unpredictable seen in pt's presentation of advanced age, abnormal lab value(s), need for input for task focus and mobility technique and ongoing medical assessment. Overall, pt's rehab potential and prognosis to return to PLOF is good as impacted by objective findings, warranting pt to receive further skilled PT interventions to address identified impairments, activity limitation(s), and participation restriction(s). Pt to benefit from continued PT tx to address deficits as defined above and maximize level of functional independent mobility. From PT/mobility standpoint, recommendation at time of d/c would be post acute rehabilitation services pending progress in order to facilitate return to PLOF. Barriers to Discharge: Inaccessible home environment, Decreased caregiver support     PT Discharge Recommendation: Post acute rehabilitation services    See flowsheet documentation for full assessment.

## 2023-08-15 NOTE — RESPIRATORY THERAPY NOTE
RT Protocol Note  Antonio Cifuentes 79 y.o. female MRN: [id number]  Unit/Bed#: -01 Encounter: [id number]    Assessment    Principal Problem:    Fall  Active Problems:    Hypothyroid    Hypertension    Chronic diastolic (congestive) heart failure (HCC)    Anemia    AVB (atrioventricular block)    COPD, mild (HCC)    Coronary artery disease    JANICE on CPAP    Acute-on-chronic kidney injury ([address])    Leukocytosis      Home Pulmonary Medications:  PRN inhaler       Past Medical History:   Diagnosis Date   • Anemia 2018   • Anxiety    • Arthritis    • AVB (atrioventricular block)     first degree   • Cataract    • CHF (congestive heart failure) (HCC)    • COPD, mild (HCC)    • Coronary artery disease    • Dislocation of right shoulder joint    • Frequent UTI    • GERD (gastroesophageal reflux disease)    • H/O: pneumonia    • Heme positive stool    • Hyperlipidemia    • Hypertension    • Hypothyroidism    • Morbid obesity with BMI of 50.0-59.9, adult ([address])    • Obesity, morbid ([address]) 2018   • JANICE on CPAP    • Pulmonary hypertension ([address]) 2018   • Severe aortic stenosis    • Simple goiter    • Skin cyst     within the armpits, right   • Wears glasses      Social History     Socioeconomic History   • Marital status: Single     Spouse name: Not on file   • Number of children: Not on file   • Years of education: Not on file   • Highest education level: Not on file   Occupational History   • Occupation: retired   Tobacco Use   • Smoking status: Never   • Smokeless tobacco: Never   Vaping Use   • Vaping Use: Never used   Substance and Sexual Activity   • Alcohol use: No   • Drug use: No   • Sexual activity: Never   Other Topics Concern   • Not on file   Social History Narrative    Denied drinking coffee    Denied exercise habits    Most recent tobacco use screenin2018      Do you currently or have you served in the [address] [place] [address] [employer]:   No      Were you activated, into active duty, as a member of the [employer] or as a Reservist:   No          Social Determinants of Health     Financial Resource Strain: Medium Risk (9/15/2022)    Overall Financial Resource Strain (CARDIA)    • Difficulty of Paying Living Expenses: Somewhat hard   Food Insecurity: No Food Insecurity (6/27/2023)    Hunger Vital Sign    • Worried About Running Out of Food in the Last Year: Never true    • Ran Out of Food in the Last Year: Never true   Transportation Needs: No Transportation Needs (6/27/2023)    PRAPARE - Transportation    • Lack of Transportation (Medical): No    • Lack of Transportation (Non-Medical): No   Physical Activity: Not on file   Stress: Not on file   Social Connections: Not on file   Intimate Partner Violence: Not on file   Housing Stability: Low Risk  (6/27/2023)    Housing Stability Vital Sign    • Unable to Pay for Housing in the Last Year: No    • Number of Places Lived in the Last Year: 1    • Unstable Housing in the Last Year: No       Subjective         Objective    Physical Exam:   General Appearance: Awake, Alert  Respiratory Pattern: Normal  Chest Assessment: Chest expansion symmetrical  Bilateral Breath Sounds: Clear, Diminished  Cough: None  O2 Device: v30 Wheezy    Vitals:  Blood pressure 115/90, pulse 71, temperature 98.6 °F (37 °C), resp. rate 20, height 4' 7" (1.397 m), weight 83.9 kg (185 lb), SpO2 96 %, not currently breastfeeding.           Imaging and other studies:     O2 Device: v30 Wheezy     Plan    Respiratory Plan: Home Bronchodilator Patient pathway        Resp Comments: cpap unit set up at bedise and ready for use, hx of COPD and JANICE, PRN home medication, cpap use with supplemental o2, respirations are even and non labored, pt has no current SOB complaints, pt states she rarely needed the Albuterol since she has lost weight, will continue with prn therapy per home regimen

## 2023-08-15 NOTE — ASSESSMENT & PLAN NOTE
Recent Labs     08/14/23  1612 08/15/23  0502   CREATININE 2.26* 1.73*   EGFR 19 26     Estimated Creatinine Clearance: 23.3 mL/min (A) (by C-G formula based on SCr of 1.73 mg/dL (H)).     · Creatinine 2.26; baseline around 1.1   · Patient reports poor oral intake; suspecting prerenal dehydration as cause  · Denies flank/abd pain   · Gentle IVF hydration overnight  · Avoid nephrotoxic agents   · AM BMP

## 2023-08-15 NOTE — PLAN OF CARE
Problem: SAFETY ADULT  Goal: Patient will remain free of falls  Description: INTERVENTIONS:  - Educate patient/family on patient safety including physical limitations  - Instruct patient to call for assistance with activity   - Consult OT/PT to assist with strengthening/mobility   - Keep Call bell within reach  - Keep bed low and locked with side rails adjusted as appropriate  - Keep care items and personal belongings within reach  - Initiate and maintain comfort rounds  - Make Fall Risk Sign visible to staff  - Offer Toileting every 2 Hours, in advance of need  - Initiate/Maintain bed/chair alarm  - Apply yellow socks and bracelet for high fall risk patients  - Consider moving patient to room near nurses station  Outcome: Progressing  Goal: Maintain or return to baseline ADL function  Description: INTERVENTIONS:  -  Assess patient's ability to carry out ADLs; assess patient's baseline for ADL function and identify physical deficits which impact ability to perform ADLs (bathing, care of mouth/teeth, toileting, grooming, dressing, etc.)  - Assess/evaluate cause of self-care deficits   - Assess range of motion  - Assess patient's mobility; develop plan if impaired  - Assess patient's need for assistive devices and provide as appropriate  - Encourage maximum independence but intervene and supervise when necessary  - Involve family in performance of ADLs  - Assess for home care needs following discharge   - Consider OT consult to assist with ADL evaluation and planning for discharge  - Provide patient education as appropriate  Outcome: Progressing

## 2023-08-15 NOTE — RESPIRATORY THERAPY NOTE
08/14/23 2050   Respiratory Assessment   Resp Comments cpap unit set up at bedise and ready for use   O2 Device v30 Wheezy   Non-Invasive Information   Non-Invasive Ventilation Mode CPAP   $ Continous NIV Initial   Non-Invasive Settings   FiO2 (%)   (2L o2 bleed in (per pt))   PEEP/CPAP (cm H2O)   (auto 6-18 cmH2o)   Rise Time 2  (c flex)   Humidification   (dry circuit, n/a)   Temperature (Set)   (n/a)     Pt does not know pressure setting on her unit cpap nor could it be found in e chart but does state she needs an additional 2L with it, set up at this time with auto setting 6-18 cmH2o and 2L o2 bleed in

## 2023-08-15 NOTE — RESPIRATORY THERAPY NOTE
08/15/23 0250   Respiratory Assessment   Assessment Type Assess only   General Appearance Sleeping   Respiratory Pattern Normal   Chest Assessment Chest expansion symmetrical   Bilateral Breath Sounds Diminished   R Breath Sounds Diminished   L Breath Sounds Diminished   Location Specific No   Cough None   Resp Comments Pt remains on cpap   O2 Device V30   Non-Invasive Information   O2 Interface Device Face mask   Non-Invasive Ventilation Mode CPAP   SpO2 94 %   $ Pulse Oximetry Spot Check Charge Completed   Non-Invasive Settings   FiO2 (%) 28   PEEP/CPAP (cm H2O) 8   Rise Time 2   Non-Invasive Readings   Skin Intervention Skin intact   Total Rate 166   Spontaneous Vt (mL) 245   Spontaneous MV (mL) 5.8   Leak (lpm) 25   Non-Invasive Alarms   Low Insp Pressure Time (sec) 20 sec   High Resp Rate (BPM) 45 BPM     RT Ventilator Management Note  Juris Parcel 79 y.o. female MRN: [id number]  Unit/Bed#: -01 Encounter: [id number]      Daily Screen    No data found in the last 10 encounters.            Physical Exam:   Assessment Type: Assess only  General Appearance: Sleeping  Respiratory Pattern: Normal  Chest Assessment: Chest expansion symmetrical  Bilateral Breath Sounds: Diminished  R Breath Sounds: Diminished  L Breath Sounds: Diminished  Location Specific: No  Cough: None  O2 Device: V30      Resp Comments: Pt remains on cpap

## 2023-08-15 NOTE — PHYSICAL THERAPY NOTE
Physical Therapy Evaluation     Patient's Name: Marianna Howell    Admitting Diagnosis  Shoulder pain [M25.519]  ERIN (acute kidney injury) ([address]) [N17.9]  Fall, initial encounter [W19. XXXA]    Problem List  Patient Active Problem List   Diagnosis    Hypothyroid    Hypertension    Hyperlipidemia    Reactive airway disease without complication    JANICE (obstructive sleep apnea)    LVH (left ventricular hypertrophy)    Mitral annular calcification    Mitral valve stenosis    Pulmonary hypertension (HCC)    Chronic diastolic (congestive) heart failure (HCC)    Anemia    Obesity, morbid (HCC)    Gastroesophageal reflux disease without esophagitis    Arthritis    AVB (atrioventricular block)    COPD, mild (HCC)    Coronary artery disease    JANICE on CPAP    S/P TAVR (transcatheter aortic valve replacement)    Depression with anxiety    Insomnia    Osteopenia    Depression, recurrent (HCC)    Radiculopathy, lumbar region    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Stage 3a chronic kidney disease ([address])    Urinary incontinence    Orbital mass    Fall    Acute-on-chronic kidney injury ([address])    Leukocytosis       Past Medical History  Past Medical History:   Diagnosis Date    Anemia 08/22/2018    Anxiety     Arthritis     AVB (atrioventricular block)     first degree    Cataract     CHF (congestive heart failure) (HCC)     COPD, mild (HCC)     Coronary artery disease     Dislocation of right shoulder joint     Frequent UTI     GERD (gastroesophageal reflux disease)     H/O: pneumonia     Heme positive stool     Hyperlipidemia     Hypertension     Hypothyroidism     Morbid obesity with BMI of 50.0-59.9, adult ([address])     Obesity, morbid ([address]) 08/22/2018    JANICE on CPAP     Pulmonary hypertension ([address]) 08/22/2018    Severe aortic stenosis     Simple goiter     Skin cyst     within the armpits, right    Wears glasses        Past Surgical History  Past Surgical History:   Procedure Laterality Date    BREAST BIOPSY      CARDIAC CATHETERIZATION      CARPAL TUNNEL RELEASE Bilateral     CHOLECYSTECTOMY      DILATION AND CURETTAGE OF UTERUS      HYSTEROSCOPY      MASTOID SURGERY      AL COLONOSCOPY FLX DX W/COLLJ SPEC WHEN PFRMD N/A 9/6/2018    Procedure: COLONOSCOPY;  Surgeon: Tiki Obregon MD;  Location: [facility]; Service: Gastroenterology    AL ECHO TRANSESOPHAG R-T 2D W/PRB IMG ACQUISJ I&R N/A 10/9/2018    Procedure: INTRA-OP TRANSESOPHAGEAL ECHOCARDIOGRAM (GARRISON); Surgeon: Rupal Henderson DO;  [facility]: [facility];  Service: Cardiac Surgery    AL ESOPHAGOGASTRODUODENOSCOPY TRANSORAL DIAGNOSTIC N/A 8/31/2018    Procedure: ESOPHAGOGASTRODUODENOSCOPY (EGD); Surgeon: Tiki Obregon MD;  Location: [facility]; Service: Gastroenterology    AL REPLACE AORTIC VALVE OPENFEMORAL ARTERY APPROACH N/A 10/9/2018    Procedure: REPLACEMENT AORTIC VALVE TRANSCATHETER (TAVR) TRANSFEMORAL W/ 23 MM MENDOZA NOE S3 VALVE (ACCESS OF LEFT); Surgeon: Rupal Henderson DO;  [facility];  Service: Cardiac Surgery    TOTAL HIP ARTHROPLASTY Left 2007    TOTAL KNEE ARTHROPLASTY Bilateral         08/15/23 0953   PT Last Visit   PT Visit Date 08/15/23   Note Type   Note type Evaluation   Pain Assessment   Pain Assessment Tool 0-10   Pain Score 10 - Worst Possible Pain   Pain Location/Orientation Orientation: Bilateral;Location: Shoulder;Orientation: Left; Location: Foot   Hospital Pain Intervention(s) Emotional support;Repositioned;MD notified (Comment)  (JUS Morales and Dr. Bob Reynoso made aware of pt's pain report)   Restrictions/Precautions   Weight Bearing Precautions Per Order No   Other Precautions Chair Alarm; Bed Alarm; Fall Risk;Pain;Multiple lines   Home Living   Type of [address] One level;Ramped entrance;Performs ADLs on one level   Bathroom Shower/Tub Walk-in shower   Bathroom Toilet Standard   Bathroom Equipment Grab bars in shower; [address]; Hospital bed  (uses rollator (indoors) vs rolling walker (shopping). doesn't use hospital bed, currently sleeping in lift recliner)   Prior Function   Level of [place] Independent with ADLs; Independent with functional mobility; Needs assistance with IADLS   Lives With Alone  (couple living downstairs temporarily, not providing any assistance)   Receives Help From Friend(s)   IADLs Independent with driving; Independent with meal prep; Independent with medication management   Falls in the last 6 months 1 to 4   Vocational Retired   General   Family/Caregiver Present No   Cognition   Arousal/Participation Alert   Orientation Level Oriented to person;Oriented to place;Oriented to situation  ("August 25th, 1924")   Memory Decreased short term memory;Decreased recall of recent events;Decreased recall of precautions   Following Commands Follows one step commands with increased time or repetition   Comments pt agreeable to PT evaluation   RLE Assessment   RLE Assessment   (grossly 3+/5 observed with functional mobility)   LLE Assessment   LLE Assessment   (grossly 3+/5 observed with functional mobility)   Coordination   Movements are Fluid and Coordinated 1   Sensation WFL   Bed Mobility   Supine to Sit 3  Moderate assistance   Additional items Assist x 1;HOB elevated; Bedrails; Increased time required;Verbal cues   Transfers   Sit to Stand 3  Moderate assistance   Additional items Assist x 2;Armrests; Increased time required;Verbal cues   Stand to Sit 3  Moderate assistance   Additional items Assist x 2;Armrests; Increased time required;Verbal cues   Additional Comments pt unable to demonstrate egress test components, bed chair swap performed for enhanced pt safety with OOB transfers   Ambulation/Elevation   Gait pattern Not tested   Balance   Static Sitting Fair -   Dynamic Sitting Poor +   Static Standing Poor   Endurance Deficit   Endurance Deficit Yes   Activity Tolerance   Activity Tolerance Patient limited by fatigue;Patient limited by pain   Melchor Aiken. JOS Walsh made aware of PT rec   Nurse Made Aware RN Marcellus Allen   Assessment   Prognosis Good   Problem List Decreased strength;Decreased endurance; Impaired balance;Decreased mobility;Pain;Obesity   Assessment Pt is 79 y.o. female seen for PT evaluation on 8/15/2023 [facility]20[facility] w/ Acute-on-chronic kidney injury ([address]). PT was consulted to assess pt's functional mobility and d/c needs. Order placed for PT eval and tx. PTA, pt resides alone in [facility] with ramped entrance, ambulates with rollator vs RW; +fall history. At time of eval, pt requiring mod A for bed mobility, mod A x2 for transfers. Upon evaluation, pt presenting with impaired functional mobility d/t decreased strength, decreased endurance, impaired balance, decreased mobility, pain and activity intolerance. Pertinent PMHx and current co-morbidities affecting pt's physical performance at time of assessment include: ERIN, hypothyroid, HTN, CHF, anemia, AVB, COPD, CAD, JANICE on CPAP, fall, leukocytosis, HLD, JANICE, arthritis, s/p TAVR, depression with anxiety, osteopenia, radiculopathy, urinary incontinence. Personal factors affecting pt at time of eval include: inaccessible home environment, inability to ambulate household distances, inability to navigate level surfaces w/o external assistance, limited home support and positive fall history. The following objective measures performed on IE also reveal limitations: Barthel Index: 30/100, Modified Crenshaw: 4 (moderate/severe disability) and AM-PAC 6-Clicks: 4/21. Pt's clinical presentation is currently unstable/unpredictable seen in pt's presentation of advanced age, abnormal lab value(s), need for input for task focus and mobility technique and ongoing medical assessment.  Overall, pt's rehab potential and prognosis to return to PLOF is good as impacted by objective findings, warranting pt to receive further skilled PT interventions to address identified impairments, activity limitation(s), and participation restriction(s). Pt to benefit from continued PT tx to address deficits as defined above and maximize level of functional independent mobility. From PT/mobility standpoint, recommendation at time of d/c would be post acute rehabilitation services pending progress in order to facilitate return to PLOF. Barriers to Discharge Inaccessible home environment;Decreased caregiver support   Goals   Patient Goals to go home   STG Expiration Date 08/25/23   Short Term Goal #1 In 7-10 days: Increase bilateral LE strength 1/2 grade to facilitate independent mobility, Perform all bed mobility tasks with min A of 1 to decrease caregiver burden, Perform all transfers with min A of 1 to improve independence, Increase static sitting, dynamic sitting and static standing balance 1/2 grade to decrease risk for falls, Complete exercise program independently, Tolerate 4 hr OOB to faciliate upright tolerance, Improve Barthel Index score to 45 or greater to facilitate independence, PT provider will perform functional balance assessment to determine fall risk and PT to see and establish goals for dynamic standing/ambulatory balance, ambulation when appropriate   PT Treatment Day 1   Plan   Treatment/Interventions Functional transfer training;LE strengthening/ROM; Therapeutic exercise; Endurance training;Patient/family training;Equipment eval/education; Bed mobility;Continued evaluation;Spoke to nursing   PT Frequency 3-5x/wk   Recommendation   PT Discharge Recommendation Post acute rehabilitation services   Equipment Recommended   (TBD)   AM-PAC Basic Mobility Inpatient   Turning in Flat Bed Without Bedrails 2   Lying on Back to Sitting on Edge of Flat Bed Without Bedrails 2   Moving Bed to Chair 1   Standing Up From Chair Using Arms 1   Walk in Room 1   Climb 3-5 Stairs With Railing 1   Basic Mobility Inpatient Raw Score 8   Turning Head Towards Sound 4   Follow Simple Instructions 4   Low Function Basic Mobility Raw Score  16   Low Function Basic Mobility Standardized Score  25.72   Highest Level Of Mobility   -[facility] Goal 3: Sit at edge of bed   -HL Achieved 4: Move to chair/commode   Modified Cable Scale   Modified Cable Scale 4   Barthel Index   Feeding 5   Bathing 0   Grooming Score 0   Dressing Score 5   Bladder Score 0   Bowels Score 10   Toilet Use Score 5   Transfers (Bed/Chair) Score 5   Mobility (Level Surface) Score 0   Stairs Score 0   Barthel Index Score 30   Additional Treatment Session   Start Time 1015   End Time 1025   Treatment Assessment Pt seen for PT treatment session this date s/p PT eval, consisting of ther act focused on sit to stand transfer, toilet transfer, standing balance/posture facilitation. Education on offloading LLE for enhanced comfort during static standing during kamari care s/p toileting on BSC. Pt unable to demonstrate egress test components, gait deferred at this time. Current goals and POC remain appropriate, pt continues to have rehab potential and is making progress towards STGs. Pt prognosis for achieving goals is good, pending pt progress with hospitalization/medical status improvements, and indicated by [facility], ability to follow directions and self-expression (thoughts, feelings, needs). Pt limited d/t the presence of intractable pain, fear of pain provocation and fear of falling. PT recommends post acute rehabilitation services upon discharge. Pt continues to be functioning below baseline level, and remains limited 2* factors listed above. PT will continue to see pt during current hospitalization in order to address the deficits listed above and provide interventions consistent w/ POC in effort to achieve STGs. End of Consult   Patient Position at End of Consult Bedside chair;Bed/Chair alarm activated; All needs within reach           Kelvin Henriquez, PT, DPT

## 2023-08-15 NOTE — CASE MANAGEMENT
Case Management Assessment & Discharge Planning Note    Patient name Kwadwo Goode  Location /-31 MRN [id number]  : 1940 Date 8/15/2023       Current Admission Date: 2023  Current Admission Diagnosis:Fall   Patient Active Problem List    Diagnosis Date Noted   • Fall 2023   • Acute-on-chronic kidney injury ([address]) 2023   • Leukocytosis 2023   • Orbital mass 2023   • Urinary incontinence 10/26/2022   • 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2022   • Stage 3a chronic kidney disease ([address]) 2022   • Radiculopathy, lumbar region 07/10/2021   • Depression, recurrent ([address]) 2021   • Osteopenia 10/22/2020   • Insomnia 2020   • Depression with anxiety 2019   • 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/2018   • Arthritis    • AVB (atrioventricular block)    • COPD, mild (HCC)    • Coronary artery disease    • JANICE on CPAP    • Gastroesophageal reflux disease without esophagitis 2018   • Anemia 2018   • Obesity, morbid ([address]) 2018   • Chronic diastolic (congestive) heart failure ([address]) 2018   • Reactive airway disease without complication    • JANICE (obstructive sleep apnea) 2018   • Hyperlipidemia 2017   • Hypertension 2017   • Hypothyroid 10/29/2017   • LVH (left ventricular hypertrophy) 2016   • Mitral annular calcification 2016   • Mitral valve stenosis 2016   • Pulmonary hypertension ([address]) 2016      LOS (days): 1  Geometric Mean LOS (GMLOS) (days): 3.10  Days to GMLOS:2.5     OBJECTIVE:    Risk of Unplanned Readmission Score: 18.24         Current admission status: Inpatient       [facility] Pharmacy:   [id number] [address] [address]  Phone: [phone] Fax: [phone]    Primary Care Provider: MABEL Tang    Primary Insurance: MEDICARE  Secondary Insurance:     ASSESSMENT:  Annalee Proxies     [address]   Primary Phone: [phone] (Mobile)               Advance Directives  Does patient have a [address]?: Yes  Does patient have Advance Directives?: Yes  Advance Directives: Living will, Power of  for health care, Power of  for finance  Primary Contact: Salina Saldaña         Readmission Root Cause  30 Day Readmission: No    Patient Information  Admitted from[de-identified] Home  Mental Status: Alert  During Assessment patient was accompanied by: Not accompanied during assessment  Assessment information provided by[de-identified] Patient  Primary Caregiver: Self  Support Systems: [place] of Residence: [address] [place] [address] do you live in?: [facility] entry access options.  Select all that apply.: Ramp  Type of Current Residence: 2 story home  Upon entering residence, is there a bedroom on the main floor (no further steps)?: Yes  Upon entering residence, is there a bathroom on the main floor (no further steps)?: Yes  In the last 12 months, was there a time when you were not able to pay the mortgage or rent on time?: No  In the last 12 months, how many places have you lived?: 1  In the last 12 months, was there a time when you did not have a steady place to sleep or slept in a shelter (including now)?: No  Homeless/housing insecurity resource given?: N/A  Living Arrangements: Lives Alone  Is patient a ?: No    Activities of Daily Living Prior to Admission  Functional Status: Independent  Completes ADLs independently?: Yes  Ambulates independently?: Yes  Does patient use assisted devices?: Yes  Assisted Devices (DME) used: Walker, Shower Chair  Does patient currently own DME?: Yes  What DME does the patient currently own?: Sushil Anna  Does patient have a history of Outpatient Therapy (PT/OT)?: Yes  Does the patient have a history of Short-Term Rehab?: Yes ([facility])  Does patient have a history of HHC?: Yes ([facility])  Does patient currently have [facility]?: No         Patient Information Continued  Income Source: Unemployed  Does patient have prescription coverage?: Yes  Within the past 12 months, you worried that your food would run out before you got the money to buy more.: Never true  Within the past 12 months, the food you bought just didn't last and you didn't have money to get more.: Never true  Food insecurity resource given?: N/A  Does patient receive dialysis treatments?: No  Does patient have a history of substance abuse?: No  Does patient have a history of Mental Health Diagnosis?: No         Means of Transportation  Means of Transport to Appts[de-identified] Drives Self  In the past 12 months, has lack of transportation kept you from medical appointments or from getting medications?: No  In the past 12 months, has lack of transportation kept you from meetings, work, or from getting things needed for daily living?: No  Was application for public transport provided?: N/A        DISCHARGE DETAILS:    Discharge planning discussed with[de-identified] patient     Comments - Freedom of Choice: Pt states she will not go to [facility] but would like homecare, referral sent  CM contacted family/caregiver?: No- see comments                  Requested [facility]         Is the patient interested in [facility] at discharge?: Yes  [facility] requested[de-identified] Nursing, Occupational Therapy, Physical [facility] Name[de-identified] Other (Referrals sent)  [facility] Provider[de-identified] PCP  Home Health Services Needed[de-identified] Evaluate Functional Status and Safety, Gait/ADL Training, Strengthening/Theraputic Exercises to Improve Function  Homebound Criteria Met[de-identified] Uses an Assist Device (i.e. cane, walker, etc)  Supporting Clincal Findings[de-identified] Limited Endurance, Fatigues Easliy in [employer]    DME Referral Provided  Referral made for DME?: No    Other Referral/Resources/Interventions Provided:  Referral Comments: Pt states she will not go to [facility] but would like homecare, referral sent         Treatment Team Recommendation: Home with [address]  Discharge Destination Plan[de-identified] Home with [address] at Discharge : Family, Automobile

## 2023-08-15 NOTE — PROGRESS NOTES
2025: Patient c/o pain 10/10 "all over." States she does not want the tylenol as she was taking tylenol with codeine at home. TT sent to house officer. New orders received. Patient medicated as per MAR.

## 2023-08-16 LAB
ALBUMIN SERPL BCP-MCNC: 3.3 G/DL (ref 3.5–5)
ALP SERPL-CCNC: 192 U/L (ref 34–104)
ALT SERPL W P-5'-P-CCNC: 60 U/L (ref 7–52)
ANION GAP SERPL CALCULATED.3IONS-SCNC: 7 MMOL/L
AST SERPL W P-5'-P-CCNC: 68 U/L (ref 13–39)
ATRIAL RATE: 55 BPM
BASOPHILS # BLD AUTO: 0.04 THOUSANDS/ÂΜL (ref 0–0.1)
BASOPHILS NFR BLD AUTO: 0 % (ref 0–1)
BILIRUB SERPL-MCNC: 0.91 MG/DL (ref 0.2–1)
BUN SERPL-MCNC: 63 MG/DL (ref 5–25)
CALCIUM ALBUM COR SERPL-MCNC: 10.3 MG/DL (ref 8.3–10.1)
CALCIUM SERPL-MCNC: 9.7 MG/DL (ref 8.4–10.2)
CHLORIDE SERPL-SCNC: 108 MMOL/L (ref 96–108)
CO2 SERPL-SCNC: 21 MMOL/L (ref 21–32)
CREAT SERPL-MCNC: 1.69 MG/DL (ref 0.6–1.3)
EOSINOPHIL # BLD AUTO: 0.42 THOUSAND/ÂΜL (ref 0–0.61)
EOSINOPHIL NFR BLD AUTO: 3 % (ref 0–6)
ERYTHROCYTE [DISTWIDTH] IN BLOOD BY AUTOMATED COUNT: 14.7 % (ref 11.6–15.1)
FERRITIN SERPL-MCNC: 207 NG/ML (ref 11–307)
GFR SERPL CREATININE-BSD FRML MDRD: 27 ML/MIN/1.73SQ M
GLUCOSE SERPL-MCNC: 117 MG/DL (ref 65–140)
HCT VFR BLD AUTO: 32.3 % (ref 34.8–46.1)
HGB BLD-MCNC: 9.9 G/DL (ref 11.5–15.4)
IMM GRANULOCYTES # BLD AUTO: 0.12 THOUSAND/UL (ref 0–0.2)
IMM GRANULOCYTES NFR BLD AUTO: 1 % (ref 0–2)
IRON SATN MFR SERPL: 7 % (ref 15–50)
IRON SERPL-MCNC: 16 UG/DL (ref 50–170)
LYMPHOCYTES # BLD AUTO: 1.11 THOUSANDS/ÂΜL (ref 0.6–4.47)
LYMPHOCYTES NFR BLD AUTO: 8 % (ref 14–44)
MAGNESIUM SERPL-MCNC: 2.3 MG/DL (ref 1.9–2.7)
MCH RBC QN AUTO: 28.1 PG (ref 26.8–34.3)
MCHC RBC AUTO-ENTMCNC: 30.7 G/DL (ref 31.4–37.4)
MCV RBC AUTO: 92 FL (ref 82–98)
MONOCYTES # BLD AUTO: 1.1 THOUSAND/ÂΜL (ref 0.17–1.22)
MONOCYTES NFR BLD AUTO: 8 % (ref 4–12)
NEUTROPHILS # BLD AUTO: 10.81 THOUSANDS/ÂΜL (ref 1.85–7.62)
NEUTS SEG NFR BLD AUTO: 80 % (ref 43–75)
NRBC BLD AUTO-RTO: 0 /100 WBCS
PLATELET # BLD AUTO: 322 THOUSANDS/UL (ref 149–390)
PMV BLD AUTO: 9.8 FL (ref 8.9–12.7)
POTASSIUM SERPL-SCNC: 5.3 MMOL/L (ref 3.5–5.3)
PROT SERPL-MCNC: 6.2 G/DL (ref 6.4–8.4)
QRS AXIS: -29 DEGREES
QRSD INTERVAL: 80 MS
QT INTERVAL: 364 MS
QTC INTERVAL: 409 MS
RBC # BLD AUTO: 3.52 MILLION/UL (ref 3.81–5.12)
SODIUM SERPL-SCNC: 136 MMOL/L (ref 135–147)
T WAVE AXIS: 45 DEGREES
TIBC SERPL-MCNC: 232 UG/DL (ref 250–450)
VENTRICULAR RATE: 76 BPM
WBC # BLD AUTO: 13.6 THOUSAND/UL (ref 4.31–10.16)

## 2023-08-16 PROCEDURE — 83735 ASSAY OF MAGNESIUM: CPT | Performed by: INTERNAL MEDICINE

## 2023-08-16 PROCEDURE — 94660 CPAP INITIATION&MGMT: CPT

## 2023-08-16 PROCEDURE — 85025 COMPLETE CBC W/AUTO DIFF WBC: CPT | Performed by: INTERNAL MEDICINE

## 2023-08-16 PROCEDURE — 93010 ELECTROCARDIOGRAM REPORT: CPT | Performed by: INTERNAL MEDICINE

## 2023-08-16 PROCEDURE — 94760 N-INVAS EAR/PLS OXIMETRY 1: CPT

## 2023-08-16 PROCEDURE — 94664 DEMO&/EVAL PT USE INHALER: CPT

## 2023-08-16 PROCEDURE — 97167 OT EVAL HIGH COMPLEX 60 MIN: CPT

## 2023-08-16 PROCEDURE — 99233 SBSQ HOSP IP/OBS HIGH 50: CPT | Performed by: INTERNAL MEDICINE

## 2023-08-16 PROCEDURE — 80053 COMPREHEN METABOLIC PANEL: CPT | Performed by: INTERNAL MEDICINE

## 2023-08-16 RX ORDER — SODIUM CHLORIDE, SODIUM GLUCONATE, SODIUM ACETATE, POTASSIUM CHLORIDE, MAGNESIUM CHLORIDE, SODIUM PHOSPHATE, DIBASIC, AND POTASSIUM PHOSPHATE .53; .5; .37; .037; .03; .012; .00082 G/100ML; G/100ML; G/100ML; G/100ML; G/100ML; G/100ML; G/100ML
50 INJECTION, SOLUTION INTRAVENOUS CONTINUOUS
Status: DISPENSED | OUTPATIENT
Start: 2023-08-16 | End: 2023-08-17

## 2023-08-16 RX ORDER — HYDRALAZINE HYDROCHLORIDE 20 MG/ML
10 INJECTION INTRAMUSCULAR; INTRAVENOUS EVERY 6 HOURS PRN
Status: DISCONTINUED | OUTPATIENT
Start: 2023-08-16 | End: 2023-08-19 | Stop reason: HOSPADM

## 2023-08-16 RX ORDER — LABETALOL HYDROCHLORIDE 5 MG/ML
10 INJECTION, SOLUTION INTRAVENOUS EVERY 6 HOURS PRN
Status: DISCONTINUED | OUTPATIENT
Start: 2023-08-16 | End: 2023-08-19 | Stop reason: HOSPADM

## 2023-08-16 RX ORDER — FERROUS SULFATE 325(65) MG
325 TABLET ORAL EVERY OTHER DAY
Status: DISCONTINUED | OUTPATIENT
Start: 2023-08-16 | End: 2023-08-19 | Stop reason: HOSPADM

## 2023-08-16 RX ADMIN — Medication 2 G: at 22:53

## 2023-08-16 RX ADMIN — SODIUM CHLORIDE, SODIUM GLUCONATE, SODIUM ACETATE, POTASSIUM CHLORIDE, MAGNESIUM CHLORIDE, SODIUM PHOSPHATE, DIBASIC, AND POTASSIUM PHOSPHATE 50 ML/HR: .53; .5; .37; .037; .03; .012; .00082 INJECTION, SOLUTION INTRAVENOUS at 11:41

## 2023-08-16 RX ADMIN — PANTOPRAZOLE SODIUM 40 MG: 40 TABLET, DELAYED RELEASE ORAL at 15:25

## 2023-08-16 RX ADMIN — LOSARTAN POTASSIUM 25 MG: 25 TABLET, FILM COATED ORAL at 08:00

## 2023-08-16 RX ADMIN — FERROUS SULFATE TAB 325 MG (65 MG ELEMENTAL FE) 325 MG: 325 (65 FE) TAB at 11:33

## 2023-08-16 RX ADMIN — Medication 2 G: at 17:32

## 2023-08-16 RX ADMIN — OXYCODONE HYDROCHLORIDE 5 MG: 5 TABLET ORAL at 17:34

## 2023-08-16 RX ADMIN — PRAVASTATIN SODIUM 80 MG: 80 TABLET ORAL at 15:30

## 2023-08-16 RX ADMIN — ASPIRIN 81 MG: 81 TABLET, COATED ORAL at 08:00

## 2023-08-16 RX ADMIN — SERTRALINE HYDROCHLORIDE 100 MG: 100 TABLET ORAL at 08:00

## 2023-08-16 RX ADMIN — PANTOPRAZOLE SODIUM 40 MG: 40 TABLET, DELAYED RELEASE ORAL at 08:00

## 2023-08-16 RX ADMIN — OXYCODONE HYDROCHLORIDE 5 MG: 5 TABLET ORAL at 11:36

## 2023-08-16 RX ADMIN — LEVOTHYROXINE SODIUM 50 MCG: 0.05 TABLET ORAL at 05:09

## 2023-08-16 RX ADMIN — BUDESONIDE AND FORMOTEROL FUMARATE DIHYDRATE 2 PUFF: 160; 4.5 AEROSOL RESPIRATORY (INHALATION) at 08:02

## 2023-08-16 RX ADMIN — HEPARIN SODIUM 5000 UNITS: 5000 INJECTION INTRAVENOUS; SUBCUTANEOUS at 15:21

## 2023-08-16 RX ADMIN — BUDESONIDE AND FORMOTEROL FUMARATE DIHYDRATE 2 PUFF: 160; 4.5 AEROSOL RESPIRATORY (INHALATION) at 18:47

## 2023-08-16 RX ADMIN — HEPARIN SODIUM 5000 UNITS: 5000 INJECTION INTRAVENOUS; SUBCUTANEOUS at 05:09

## 2023-08-16 RX ADMIN — Medication 2 G: at 11:35

## 2023-08-16 RX ADMIN — ACETAMINOPHEN 650 MG: 325 TABLET, FILM COATED ORAL at 15:25

## 2023-08-16 RX ADMIN — LIDOCAINE 5% 1 PATCH: 700 PATCH TOPICAL at 08:00

## 2023-08-16 RX ADMIN — HEPARIN SODIUM 5000 UNITS: 5000 INJECTION INTRAVENOUS; SUBCUTANEOUS at 22:52

## 2023-08-16 RX ADMIN — OXYBUTYNIN CHLORIDE 10 MG: 5 TABLET, EXTENDED RELEASE ORAL at 08:00

## 2023-08-16 RX ADMIN — Medication 2 G: at 08:01

## 2023-08-16 NOTE — RESPIRATORY THERAPY NOTE
08/16/23 0411   Respiratory Assessment   Assessment Type Assess only   General Appearance Sleeping   Respiratory Pattern Normal   Chest Assessment Chest expansion symmetrical   Bilateral Breath Sounds Diminished   R Breath Sounds Diminished   L Breath Sounds Diminished   Location Specific No   Cough None   Resp Comments Pt Remains on cpap   O2 Device V30   Non-Invasive Information   O2 Interface Device Face mask   Non-Invasive Ventilation Mode CPAP   SpO2 94 %   $ Pulse Oximetry Spot Check Charge Completed   Non-Invasive Settings   FiO2 (%) 28   PEEP/CPAP (cm H2O) 8   Rise Time 2   Non-Invasive Readings   Skin Intervention Skin intact   Total Rate 19   Spontaneous Vt (mL) 373   Spontaneous MV (mL) 4.8   Leak (lpm) 20   Non-Invasive Alarms   Low Insp Pressure Time (sec) 20 sec   High Resp Rate (BPM) 45 BPM     RT Ventilator Management Note  Guillermo Damon 79 y.o. female MRN: [id number]  Unit/Bed#: -01 Encounter: [id number]      Daily Screen    No data found in the last 10 encounters.            Physical Exam:   Assessment Type: Assess only  General Appearance: Sleeping  Respiratory Pattern: Normal  Chest Assessment: Chest expansion symmetrical  Bilateral Breath Sounds: Diminished  R Breath Sounds: Diminished  L Breath Sounds: Diminished  Location Specific: No  Cough: None  O2 Device: V30      Resp Comments: Pt Remains on cpap

## 2023-08-16 NOTE — ASSESSMENT & PLAN NOTE
Body mass index is 44.99 kg/m².     • Recommend incorporating a more whole foods plant-predominant diet along with decreasing consumption of red meats and processed foods  • Per AHA guidelines, recommend moderate-vigorous intensity exercise for 30 minutes a day for 5 days a week or a total of 150 min/week

## 2023-08-16 NOTE — ASSESSMENT & PLAN NOTE
Wt Readings from Last 3 Encounters:   08/16/23 87.8 kg (193 lb 9 oz)   08/11/23 83.9 kg (185 lb)   04/13/23 85.1 kg (187 lb 9.6 oz)     · Appears euvolemic on admission   · 06/2022 ECHO EF 60%  · Continue home PRN PO lasix   · Monitor for s/s of volume retention

## 2023-08-16 NOTE — ASSESSMENT & PLAN NOTE
Wt Readings from Last 3 Encounters:   08/15/23 86 kg (189 lb 9.5 oz)   08/11/23 83.9 kg (185 lb)   04/13/23 85.1 kg (187 lb 9.6 oz)     · Appears euvolemic on admission   · 06/2022 ECHO EF 60%  · Continue home PRN PO lasix   · Monitor for s/s of volume retention

## 2023-08-16 NOTE — ASSESSMENT & PLAN NOTE
Recent Labs     08/14/23  1612 08/15/23  0502   HGB 10.6* 9.6*       · 11.9 on 07/11/2023  · Denies s/s of bleeding; monitor for development  · AM CBC ordered

## 2023-08-16 NOTE — ASSESSMENT & PLAN NOTE
Recent Labs     08/14/23  1612 08/15/23  0502 08/16/23  0507   HGB 10.6* 9.6* 9.9*     Lab Results   Component Value Date    IRON 16 (L) 08/15/2023    FERRITIN 207 08/15/2023    TIBC 232 (L) 08/15/2023    CONCFE 7 (L) 08/15/2023       · 11.9 on 07/11/2023  · Denies s/s of bleeding; monitor for development  · Iron QOD  · AM CBC ordered

## 2023-08-16 NOTE — PLAN OF CARE
Problem: SAFETY ADULT  Goal: Patient will remain free of falls  Description: INTERVENTIONS:  - Educate patient/family on patient safety including physical limitations  - Instruct patient to call for assistance with activity   - Consult OT/PT to assist with strengthening/mobility   - Keep Call bell within reach  - Keep bed low and locked with side rails adjusted as appropriate  - Keep care items and personal belongings within reach  - Initiate and maintain comfort rounds  - Make Fall Risk Sign visible to staff  - Offer Toileting every  2  Hours, in advance of need  - Initiate/Maintain  bed alarm  - Obtain necessary fall risk management equipment: daily  - Apply yellow socks and bracelet for high fall risk patients  - Consider moving patient to room near nurses station  Outcome: Progressing     Problem: Prexisting or High Potential for Compromised Skin Integrity  Goal: Skin integrity is maintained or improved  Description: INTERVENTIONS:  - Identify patients at risk for skin breakdown  - Assess and monitor skin integrity  - Assess and monitor nutrition and hydration status  - Monitor labs   - Assess for incontinence   - Turn and reposition patient  - Assist with mobility/ambulation  - Relieve pressure over bony prominences  - Avoid friction and shearing  - Provide appropriate hygiene as needed including keeping skin clean and dry  - Evaluate need for skin moisturizer/barrier cream  - Collaborate with interdisciplinary team   - Patient/family teaching  - Consider wound care consult   Outcome: Progressing

## 2023-08-16 NOTE — OCCUPATIONAL THERAPY NOTE
Occupational Therapy Evaluation      Cedrick Davies    8/16/2023    Patient Active Problem List   Diagnosis    Acquired hypothyroidism    Primary hypertension    Hyperlipidemia    Reactive airway disease without complication    JANICE (obstructive sleep apnea)    LVH (left ventricular hypertrophy)    Mitral annular calcification    Mitral valve stenosis    Class 3 severe obesity due to excess calories with serious comorbidity and body mass index (BMI) of 45.0 to 49.9 in adult [facility])    Pulmonary hypertension (HCC)    Chronic diastolic (congestive) heart failure (HCC)    Iron deficiency anemia secondary to inadequate dietary iron intake    Obesity, morbid (HCC)    Gastroesophageal reflux disease without esophagitis    Arthritis    AVB (atrioventricular block)    COPD, mild (HCC)    Coronary artery disease involving native coronary artery of native heart without angina pectoris    JANICE on CPAP    S/P TAVR (transcatheter aortic valve replacement)    Depression with anxiety    Insomnia    Osteopenia    Depression, recurrent (HCC)    Radiculopathy, lumbar region    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Stage 3a chronic kidney disease ([address])    Urinary incontinence    Orbital mass    Fall    Acute kidney injury superimposed on CKD ([address])    Leukocytosis       Past Medical History:   Diagnosis Date    Anemia 08/22/2018    Anxiety     Arthritis     AVB (atrioventricular block)     first degree    Cataract     CHF (congestive heart failure) (HCC)     COPD, mild (HCC)     Coronary artery disease     Dislocation of right shoulder joint     Frequent UTI     GERD (gastroesophageal reflux disease)     H/O: pneumonia     Heme positive stool     Hyperlipidemia     Hypertension     Hypothyroidism     Morbid obesity with BMI of 50.0-59.9, adult ([address])     Obesity, morbid ([address]) 08/22/2018    JANICE on CPAP     Pulmonary hypertension ([address]) 08/22/2018    Severe aortic stenosis     Simple goiter     Skin cyst     within the armpits, right    Wears glasses Past Surgical History:   Procedure Laterality Date    BREAST BIOPSY      CARDIAC CATHETERIZATION      CARPAL TUNNEL RELEASE Bilateral     CHOLECYSTECTOMY      DILATION AND CURETTAGE OF UTERUS      HYSTEROSCOPY      MASTOID SURGERY      WI COLONOSCOPY FLX DX W/COLLJ SPEC WHEN PFRMD N/A 9/6/2018    Procedure: COLONOSCOPY;  Surgeon: Yulia Talavera MD;  Location: [facility]; Service: Gastroenterology    WI ECHO TRANSESOPHAG R-T 2D W/PRB IMG ACQUISJ I&R N/A 10/9/2018    Procedure: INTRA-OP TRANSESOPHAGEAL ECHOCARDIOGRAM (GARRISON); Surgeon: Mike Arteaga DO;  [facility];  Service: Cardiac Surgery    WI ESOPHAGOGASTRODUODENOSCOPY TRANSORAL DIAGNOSTIC N/A 8/31/2018    Procedure: ESOPHAGOGASTRODUODENOSCOPY (EGD); Surgeon: Yulia Talavera MD;  Location: [facility]; Service: Gastroenterology    WI REPLACE AORTIC VALVE OPENFEMORAL ARTERY APPROACH N/A 10/9/2018    Procedure: REPLACEMENT AORTIC VALVE TRANSCATHETER (TAVR) TRANSFEMORAL W/ 23 MM MENDOZA NOE S3 VALVE (ACCESS OF LEFT); Surgeon: Mike Arteaga DO;  [facility];  Service: Cardiac Surgery    TOTAL HIP ARTHROPLASTY Left 2007    TOTAL KNEE ARTHROPLASTY Bilateral         08/16/23 1321   OT Last Visit   OT Visit Date 08/16/23   Note Type   Note type Evaluation   Additional Comments Pt agreeable to OT eval. Upon arrival pt supine in bed with HOB elevated. Pain Assessment   Pain Assessment Tool 0-10   Pain Score 8   Pain Location/Orientation Orientation: Bilateral;Location: Shoulder   Pain Onset/Description Onset: Ongoing;Frequency: Constant/Continuous; Descriptor: Aching;Descriptor: Discomfort   Hospital Pain Intervention(s) Ambulation/increased activity;Repositioned;Medication (See MAR)   Restrictions/Precautions   Weight Bearing Precautions Per Order No   Other Precautions Chair Alarm; Bed Alarm; Fall Risk;Multiple lines;O2;Pain  (2 L O2 via NC)   Home Living   Type of [address] [place] [address] One level;Performs ADLs on one level;Able to live on main level with bedroom/bathroom; Ramped entrance   [employer] Grab bars in shower; Shower chair   [address]  (utilizes rollator at house)   Additional Comments Sleeps in lift chair at home   Prior Function   Level of [place] Independent with ADLs; Independent with functional mobility; Independent with IADLS   Lives With Alone   Receives Help From Friend(s)   IADLs Independent with driving; Independent with meal prep; Independent with medication management   Falls in the last 6 months 1 to 4  (2 falls)   Vocational Retired   Lifestyle   Autonomy Patient reporting being independent with ADLs/IADLs, ambulatory with rollator, and lives alone in a one level house   Reciprocal Relationships Friend   Service to Others Retired   [address] [facility] Enjoys reading bible   ADL   Eating Assistance 5  Supervision/Setup   Grooming Assistance 5  Supervision/Setup    [address] [facility] 4  [address] [facility] 3  Moderate Assistance   [address]  [address] 2  Maximal [address]  2  Maximal Assistance   Additional Comments ADL levels based on functional performance during OT eval   Bed Mobility   Supine to Sit 4  Minimal assistance   Additional items Assist x 2;HOB elevated; Bedrails; Increased time required;Verbal cues;LE management   Sit to Supine   (DNT: pt seated OOB in recliner at end of session)   Additional Comments Pt denied lightheaded/dizziness with transitional movements   Transfers   Sit to Stand 4  Minimal assistance   Additional items Assist x 2;Bedrails; Increased time required;Verbal cues   Stand to Sit 4  Minimal assistance   Additional items Assist x 2;Armrests; Increased time required;Verbal cues   Toilet transfer 4  Minimal assistance   Additional items Assist x 2;Armrests; Increased time required;Commode;Verbal cues   Additional Comments Pt requires cues for proper body mechanics and safe hand placement during transitional movements   Functional Mobility   Functional Mobility 4  Minimal assistance  (Assist of 2)   Additional Comments Pt ambulated bed>commode>recliner with no overt SOB. Pt unsteady and requires cues for RW management   Additional items Rolling walker   Balance   Static Sitting Fair +   Dynamic Sitting Fair -   Static Standing Fair -   Dynamic Standing Poor +   Activity Tolerance   Activity Tolerance Patient limited by pain   Nurse Made Aware JUS Horton   RUPADMA Assessment   RUE Assessment X  (~90 degree shoulder flex; 3+/5 MMT distally)   LUE Assessment   LUE Assessment X  (~90 degree shoulder flex; 3+/5 MMT distally)   Hand Function   Gross Motor Coordination Functional   Fine Motor Coordination Functional   Sensation   Light Touch No apparent deficits   Vision-Basic Assessment   Current Vision Wears glasses all the time   Visual History Corrective eye surgery   Patient Visual Report Diplopia  (pt reports she needs to go to [facility] for an assessment regarding intermitten double vision)   Cognition   Overall Cognitive Status   (Questionable)   Arousal/Participation Alert; Responsive; Cooperative   Attention Within functional limits   Orientation Level Oriented to person;Oriented to place;Oriented to situation   Memory Decreased short term memory;Decreased recall of recent events   Following Commands Follows one step commands without difficulty   Assessment   Limitation Decreased ADL status; Decreased UE strength;Decreased UE ROM; Decreased cognition;Decreased endurance;Decreased self-care trans;Decreased high-level ADLs   Prognosis Good   Assessment Patient is a 79 y.o. female seen for OT evaluation s/p admit to [facility]  on 8/14/2023 w/Acute kidney injury superimposed on CKD ([address]  Commorbidities affecting patient's functional performance at time of assessment include: hypothyroidism, HTN, obesity, CHF, anemia, COPD, CAD, JANICE on CPAP, fall and leukocytosis. Orders placed for OT evaluation and treatment and up and OOB as tolerated . Performed at least two patient identifiers during session including name and wristband. Prior to admission, Patient reporting being independent with ADLs/IADLs, ambulatory with rollator, and lives alone in a one level house. Personal factors affecting patient at time of initial evaluation include: limited insight into deficits, difficulty performing ADLs and difficulty performing IADLs. Upon evaluation, patient requires minimal  assist for UB ADLs, moderate and maximal assist for LB ADLs, transfers and functional ambulation in room and bathroom with minimal  assist of 2, with the use of Rolling Walker. Patient is oriented to name,, oriented to place, and oriented to situation,. Occupational performance is affected by the following deficits: limited active ROM, decreased muscle strength, dynamic sit/ stand balance deficit with poor standing tolerance time for self care and functional mobility, decreased activity tolerance, impaired memory, impaired problem solving, increased pain and delayed righting and equilibrium reactions. Patient to benefit from continued Occupational Therapy treatment while in the hospital to address deficits as defined above and maximize level of functional independence with ADLs and functional mobility. Occupational Performance areas to address include: eating, grooming , bathing/ shower, dressing, toilet hygiene, transfer to all surfaces, medication routine/ management, IADLS: Household maintenance, IADLs: safety procedures and IADLs: meal prep/ clean up. From OT standpoint, recommendation at time of d/c would be Post acute rehabilitation services. Goals   Patient Goals to go home tomorrow   Plan   Treatment Interventions ADL retraining;Functional transfer training;UE strengthening/ROM; Endurance training;Cognitive reorientation;Patient/family training;Equipment evaluation/education; Compensatory technique education; Activityengagement   Goal Expiration Date 08/31/23   OT Treatment Day 0   OT Frequency 3-5x/wk   Recommendation   OT Discharge Recommendation Post acute rehabilitation services   AM-PAC Daily Activity Inpatient   Lower Body Dressing 1   Bathing 2   Toileting 1   Upper Body Dressing 3   Grooming 3   Eating 3   Daily Activity Raw Score 13   Daily Activity Standardized Score (Calc for Raw Score >=11) 32.03   AM-PAC Applied Cognition Inpatient   Following a Speech/Presentation 3   Understanding Ordinary Conversation 4   Taking Medications 3   Remembering Where Things Are Placed or Put Away 2   Remembering List of 4-5 Errands 2   Taking Care of Complicated Tasks 2   Applied Cognition Raw Score 16   Applied Cognition Standardized Score 35.03   Modified [place] Scale   Modified [place] Scale 4   End of Consult   Patient Position at End of Consult Bedside chair; All needs within reach   Nurse Communication Nurse aware of consult     GOALS:    *ADL transfers with (S) for inc'd independence with ADLs/purposeful tasks    *UB ADL with (S) for inc'd independence with self cares    *LB ADL with CGA using AE prn for inc'd independence with self cares    *Toileting with CGA for clothing management and hygiene for return to PLOF with personal care    *Increase stand tolerance x 5  m for inc'd tolerance with standing purposeful tasks    *Participate in 10m UE therex to increase overall stamina/activity tolerance for purposeful tasks    *Bed mobility- (S) for inc'd independence to manage own comfort and initiate EOB & OOB purposeful tasks    *Patient will verbalize 3 safety awareness/ principles to prevent falls in the home setting.      *Patient will verbalize and demonstrate use of energy conservation/deep breathing techniques and work simplification skills during functional activities with no verbal cues.     *Patient will increase OOB/sitting tolerance to 2-4 hours per day to increase participation in self-care and leisure tasks with no s/s of exertion. *Patient will engage in ongoing cognitive assessment to assist with safe discharge planning/recommendations.       *Pt will demonstrate use of long handled AE during 100% of tx sessions for increased ADL safety and independence following D/C     JAUN Whelan, OTR/L

## 2023-08-16 NOTE — RESPIRATORY THERAPY NOTE
RT Protocol Note  Alondra Jackman 79 y.o. female MRN: [id number]  Unit/Bed#: -01 Encounter: [id number]    Assessment    Principal Problem:    Acute kidney injury superimposed on CKD [facility])  Active Problems:    Acquired hypothyroidism    Primary hypertension    Class 3 severe obesity due to excess calories with serious comorbidity and body mass index (BMI) of 45.0 to 49.9 in adult [facility])    Chronic diastolic (congestive) heart failure (HCC)    Iron deficiency anemia secondary to inadequate dietary iron intake    AVB (atrioventricular block)    COPD, mild (HCC)    Coronary artery disease involving native coronary artery of native heart without angina pectoris    JANICE on CPAP    Fall    Leukocytosis      Physical Exam:   Assessment Type: Assess only  General Appearance: Drowsy  Respiratory Pattern: Normal  Chest Assessment: Chest expansion symmetrical  Bilateral Breath Sounds: Diminished  O2 Device: nc    Vitals:  Blood pressure 110/52, pulse 60, temperature 97.9 °F (36.6 °C), resp. rate 18, height 4' 7" (1.397 m), weight 87.8 kg (193 lb 9 oz), SpO2 97 %, not currently breastfeeding. O2 Device: nc     Plan    Respiratory Plan: Home Bronchodilator Patient pathway         08/16/23 1644   Respiratory Protocol   Protocol Initiated? No   Protocol Selection Respiratory   Language Barrier? No   Medical & Social History Reviewed? Yes   Diagnostic Studies Reviewed? Yes   Physical Assessment Performed?  Yes   Respiratory Plan Home Bronchodilator Patient pathway   Respiratory Assessment   Assessment Type Assess only   General Appearance Drowsy   Respiratory Pattern Normal   Chest Assessment Chest expansion symmetrical   Bilateral Breath Sounds Diminished   Resp Comments will cont txs as PRN at this time   O2 Device nc   Additional Assessments   Pulse 60   Respirations 18   SpO2 97 %         Resp Comments: will cont txs as PRN at this time

## 2023-08-16 NOTE — PLAN OF CARE
Problem: SAFETY ADULT  Goal: Patient will remain free of falls  Description: INTERVENTIONS:  - Educate patient/family on patient safety including physical limitations  - Instruct patient to call for assistance with activity   - Consult OT/PT to assist with strengthening/mobility   - Keep Call bell within reach  - Keep bed low and locked with side rails adjusted as appropriate  - Keep care items and personal belongings within reach  - Initiate and maintain comfort rounds  - Make Fall Risk Sign visible to staff  - Apply yellow socks and bracelet for high fall risk patients  - Consider moving patient to room near nurses station  Outcome: Progressing  Goal: Maintain or return to baseline ADL function  Description: INTERVENTIONS:  -  Assess patient's ability to carry out ADLs; assess patient's baseline for ADL function and identify physical deficits which impact ability to perform ADLs (bathing, care of mouth/teeth, toileting, grooming, dressing, etc.)  - Assess/evaluate cause of self-care deficits   - Assess range of motion  - Assess patient's mobility; develop plan if impaired  - Assess patient's need for assistive devices and provide as appropriate  - Encourage maximum independence but intervene and supervise when necessary  - Involve family in performance of ADLs  - Assess for home care needs following discharge   - Consider OT consult to assist with ADL evaluation and planning for discharge  - Provide patient education as appropriate  Outcome: Progressing     Problem: Prexisting or High Potential for Compromised Skin Integrity  Goal: Skin integrity is maintained or improved  Description: INTERVENTIONS:  - Identify patients at risk for skin breakdown  - Assess and monitor skin integrity  - Assess and monitor nutrition and hydration status  - Monitor labs   - Assess for incontinence   - Turn and reposition patient  - Assist with mobility/ambulation  - Relieve pressure over bony prominences  - Avoid friction and shearing  - Provide appropriate hygiene as needed including keeping skin clean and dry  - Evaluate need for skin moisturizer/barrier cream  - Collaborate with interdisciplinary team   - Patient/family teaching  - Consider wound care consult   Outcome: Progressing

## 2023-08-16 NOTE — PROGRESS NOTES
[address]  Progress Note  Name: John Gunter  MRN: [id number]  Unit/Bed#: -19 I Date of Admission: 8/14/2023   Date of Service: 8/16/2023 I Hospital Day: 2    Assessment/Plan   Fall  Assessment & Plan  Patient reports experiencing multiple falls over the past week. She feels that her mechanical and have occurred due to her feeling generally weak. Denies head strike or LOC, and reports that after each fall she was able to lift herself off the ground without assistance. Currently has complaints of some left foot, left shoulder (chronic, being worked up as outpatient), and neck pain, prompting ED visit. Denies other acute symptom/complaint at this time. /51   Pulse (!) 50   Temp (!) 97.2 °F (36.2 °C)   Resp 20   Ht 4' 7" (1.397 m)   Wt 87.8 kg (193 lb 9 oz)   SpO2 97%   BMI 44.99 kg/m²     · Multiple falls over the past week; reportedly mechanical 2/2 generalized weakness  · Has complaints of left foot and neck pain  · Denies head strike, LOC, or prodromal symptom  · Patient able to lift herself from the ground   · CT trauma workup negative for acute traumatic injuries   · PT/OT and case management consulted  · Supportive care and PRN pain control   · Fall precautions     * Acute kidney injury superimposed on CKD [facility])  Assessment & Plan  Recent Labs     08/14/23  1612 08/15/23  0502 08/16/23  0507   CREATININE 2.26* 1.73* 1.69*   EGFR 19 26 27     Estimated Creatinine Clearance: 24.1 mL/min (A) (by C-G formula based on SCr of 1.69 mg/dL (H)).     CKD 3B/4    · Creatinine 2.26; baseline around 1.1   · Patient reports poor oral intake; suspecting prerenal dehydration as cause  · Denies flank/abd pain   · Gentle IVF hydration overnight  · Avoid nephrotoxic agents   · AM BMP    Leukocytosis  Assessment & Plan  Recent Labs     08/14/23  1612 08/15/23  0502 08/16/23  0507   WBC 17.44* 14.45* 13.60*      · Unsure of cause currently  · Denies infectious S/S  · UA pending collection; follow-up with results  · Monitor for infectious S/S development  · CBC in am    JANICE on CPAP  Assessment & Plan  · qHS CPAP ordered     Coronary artery disease involving native coronary artery of native heart without angina pectoris  Assessment & Plan  · Denies chest pain  · Continue home daily ASA and statin    COPD, mild (HCC)  Assessment & Plan  · Does not appear to be in acute exacerbation  · Continue prehosptial inhalers   · Monitor respiratory status     AVB (atrioventricular block)  Assessment & Plan  · 1st degree AV block noted   · EKG today suggestive of such a block vs afib w/aberrant conduction? · On physical exam patient appears to be in a regular rhytm, with some pac's noted on monitor  · Would consider discussion with cardio in AM vs outpatient f/u given patient CHADVASC 4     Iron deficiency anemia secondary to inadequate dietary iron intake  Assessment & Plan  Recent Labs     08/14/23  1612 08/15/23  0502 08/16/23  0507   HGB 10.6* 9.6* 9.9*     Lab Results   Component Value Date    IRON 16 (L) 08/15/2023    FERRITIN 207 08/15/2023    TIBC 232 (L) 08/15/2023    CONCFE 7 (L) 08/15/2023       · 11.9 on 07/11/2023  · Denies s/s of bleeding; monitor for development  · Iron QOD  · AM CBC ordered     Chronic diastolic (congestive) heart failure (HCC)  Assessment & Plan  Wt Readings from Last 3 Encounters:   08/16/23 87.8 kg (193 lb 9 oz)   08/11/23 83.9 kg (185 lb)   04/13/23 85.1 kg (187 lb 9.6 oz)     · Appears euvolemic on admission   · 06/2022 ECHO EF 60%  · Continue home PRN PO lasix   · Monitor for s/s of volume retention         Class 3 severe obesity due to excess calories with serious comorbidity and body mass index (BMI) of 45.0 to 49.9 in adult [facility])  Assessment & Plan  Body mass index is 44.99 kg/m².     • Recommend incorporating a more whole foods plant-predominant diet along with decreasing consumption of red meats and processed foods  • Per AHA guidelines, recommend moderate-vigorous intensity exercise for 30 minutes a day for 5 days a week or a total of 150 min/week        Primary hypertension  Assessment & Plan  Blood Pressure: 106/51    · Hold home olmesartan and lasix  · Monitor BP per unit protocol     Acquired hypothyroidism  Assessment & Plan  · Continue home levothyroxine              VTE Pharmacologic Prophylaxis: VTE Score: 5 High Risk (Score >/= 5) - Pharmacological DVT Prophylaxis Ordered: heparin. Sequential Compression Devices Ordered. Patient Centered Rounds: I performed bedside rounds with nursing staff today. Discussions with Specialists or Other Care Team Provider:  IP CONSULT TO CASE MANAGEMENT    Education and Discussions with Family / Patient: patient only-she states she has no family and has a  but at this time is asking that we do not call anyone for update    Time Spent for Care: 55 minutes. More than 50% of total time spent on counseling and coordination of care as described above. Current Length of Stay: 2 day(s)  Current Patient Status: Inpatient   Certification Statement: The patient will continue to require additional inpatient hospital stay due to plan noted above  Discharge Plan: Anticipate discharge in 24-48 hrs to home with home services. Code Status: Level 3 - DNAR and DNI    Subjective:   Offers no new complaints at this time. No acute events reported overnight. Understanding of plan. All questions answered. Objective:     Vitals:   Temp (24hrs), Av.7 °F (36.5 °C), Min:97.2 °F (36.2 °C), Max:98.1 °F (36.7 °C)    Temp:  [97.2 °F (36.2 °C)-98.1 °F (36.7 °C)] 97.9 °F (36.6 °C)  HR:  [50-73] 60  Resp:  [18] 18  BP: (106-110)/(44-52) 110/52  SpO2:  [89 %-98 %] 97 %  Body mass index is 44.99 kg/m². Input and Output Summary (last 24 hours):      Intake/Output Summary (Last 24 hours) at 2023 1991  Last data filed at 2023 0522  Gross per 24 hour   Intake 220 ml   Output 690 ml   Net -470 ml       Physical Exam: Physical Exam  Vitals and nursing note reviewed. Constitutional:       General: She is not in acute distress. Appearance: Normal appearance. HENT:      Head: Normocephalic and atraumatic. Right Ear: External ear normal.      Left Ear: External ear normal.      Nose: Nose normal.      Mouth/Throat:      Mouth: Mucous membranes are moist.   Eyes:      Pupils: Pupils are equal, round, and reactive to light. Cardiovascular:      Rate and Rhythm: Normal rate and regular rhythm. Pulses: Normal pulses. Heart sounds: Normal heart sounds. No murmur heard. Pulmonary:      Effort: Pulmonary effort is normal. No respiratory distress. Breath sounds: Normal breath sounds. No wheezing or rales. Chest:      Chest wall: No tenderness. Abdominal:      General: Bowel sounds are normal. There is no distension. Palpations: Abdomen is soft. There is no mass. Tenderness: There is no abdominal tenderness. There is no guarding. Musculoskeletal:         General: No swelling or tenderness. Cervical back: Normal range of motion and neck supple. No rigidity or tenderness. Right lower leg: No edema. Left lower leg: No edema. Skin:     General: Skin is warm and dry. Capillary Refill: Capillary refill takes less than 2 seconds. Findings: No lesion or rash. Neurological:      General: No focal deficit present. Mental Status: She is alert.    Psychiatric:         Mood and Affect: Mood normal.          Additional Data:     Labs:  Results from last 7 days   Lab Units 08/16/23  0507   WBC Thousand/uL 13.60*   HEMOGLOBIN g/dL 9.9*   HEMATOCRIT % 32.3*   PLATELETS Thousands/uL 322   NEUTROS PCT % 80*   LYMPHS PCT % 8*   MONOS PCT % 8   EOS PCT % 3     Results from last 7 days   Lab Units 08/16/23  0507   SODIUM mmol/L 136   POTASSIUM mmol/L 5.3   CHLORIDE mmol/L 108   CO2 mmol/L 21   BUN mg/dL 63*   CREATININE mg/dL 1.69*   ANION GAP mmol/L 7   CALCIUM mg/dL 9.7   ALBUMIN g/dL 3.3*   TOTAL BILIRUBIN mg/dL 0.91   ALK PHOS U/L 192*   ALT U/L 60*   AST U/L 68*   GLUCOSE RANDOM mg/dL 117                       Lines/Drains:  Invasive Devices     Peripheral Intravenous Line  Duration           Peripheral IV 08/14/23 Left Antecubital 2 days    Peripheral IV 08/16/23 Dorsal (posterior); Left Forearm <1 day                Imaging: Reviewed radiology reports from this admission including: CT head and procedure reports    CT spine cervical without contrast    Result Date: 8/14/2023  Impression: No cervical spine fracture or traumatic malalignment. Workstation performed: [id number]     XR foot 3+ views LEFT    Result Date: 8/14/2023  Impression: No acute osseous abnormality. Workstation performed: [id number]     XR hip/pelv 2-3 vws left    Result Date: 8/14/2023  Impression: Unremarkable appearance of left total hip arthroplasty. Workstation performed: [id number]     CT head without contrast    Result Date: 8/14/2023  Impression: No acute intracranial abnormality. Workstation performed: [id number]       No Chest XR results available for this patient.      Recent Cultures (last 7 days):         Last 24 Hours Medication List:   Current Facility-Administered Medications   Medication Dose Route Frequency Provider Last Rate   • acetaminophen  650 mg Oral Q6H PRN Gove SHARI Alejo     • albuterol  2 puff Inhalation Q6H PRN Gove SHARI Alejo     • aspirin  81 mg Oral Daily [place], [place]     • budesonide-formoterol  2 puff Inhalation BID Hung SHARI Alejo     • Diclofenac Sodium  2 g Topical 4x Daily Vangie tuyet Pimentel PA-C     • ferrous sulfate  325 mg Oral Every Other Day Anjel Villar, DO     • furosemide  20 mg Oral Daily PRN Hnug SHARI Alejo     • heparin (porcine)  5,000 Units Subcutaneous [place], [place]     • hydrALAZINE  10 mg Intravenous Q6H PRN Anjel Villar, DO     • labetalol  10 mg Intravenous Q6H PRN Anjel Holderan, DO     • levothyroxine  50 mcg [facility]     • lidocaine  1 patch Topical Daily Suan SHARI Barry     • multi-electrolyte  50 mL/hr Intravenous Continuous Anjel Villar DO 50 mL/hr (08/16/23 1141)   • oxybutynin  10 mg Oral Daily Fidel Negro PA-C     • oxyCODONE  5 mg Oral Q6H PRN Alan Barry PA-C     • pantoprazole  40 mg Oral BID AC Fidel Negro PA-C     • pravastatin  80 mg Oral Daily With Golden Lacy PA-C     • sertraline  100 mg Oral Daily Nicole Anderson     • tiZANidine  4 mg Oral Q8H PRN Fidel Negro PA-C          Today, Patient Was Seen By: Vikash Graham DO    **Please Note: This note may have been constructed using a voice recognition system. **

## 2023-08-16 NOTE — PLAN OF CARE
Problem: Prexisting or High Potential for Compromised Skin Integrity  Goal: Skin integrity is maintained or improved  Description: INTERVENTIONS:  - Identify patients at risk for skin breakdown  - Assess and monitor skin integrity  - Assess and monitor nutrition and hydration status  - Monitor labs   - Assess for incontinence   - Turn and reposition patient  - Assist with mobility/ambulation  - Relieve pressure over bony prominences  - Avoid friction and shearing  - Provide appropriate hygiene as needed including keeping skin clean and dry  - Evaluate need for skin moisturizer/barrier cream  - Collaborate with interdisciplinary team   - Patient/family teaching  - Consider wound care consult   Outcome: Progressing     Problem: SAFETY ADULT  Goal: Patient will remain free of falls  Description: INTERVENTIONS:  - Educate patient/family on patient safety including physical limitations  - Instruct patient to call for assistance with activity   - Consult OT/PT to assist with strengthening/mobility   - Keep Call bell within reach  - Keep bed low and locked with side rails adjusted as appropriate  - Keep care items and personal belongings within reach  - Initiate and maintain comfort rounds  - Make Fall Risk Sign visible to staff  - Offer Toileting every 2 Hours, in advance of need  - Initiate/Maintain bed/chair alarms  - Apply yellow socks and bracelet for high fall risk patients  - Consider moving patient to room near nurses station  Outcome: Progressing  Goal: Maintain or return to baseline ADL function  Description: INTERVENTIONS:  -  Assess patient's ability to carry out ADLs; assess patient's baseline for ADL function and identify physical deficits which impact ability to perform ADLs (bathing, care of mouth/teeth, toileting, grooming, dressing, etc.)  - Assess/evaluate cause of self-care deficits   - Assess range of motion  - Assess patient's mobility; develop plan if impaired  - Assess patient's need for assistive devices and provide as appropriate  - Encourage maximum independence but intervene and supervise when necessary  - Involve family in performance of ADLs  - Assess for home care needs following discharge   - Consider OT consult to assist with ADL evaluation and planning for discharge  - Provide patient education as appropriate  Outcome: Progressing

## 2023-08-16 NOTE — ASSESSMENT & PLAN NOTE
Blood Pressure: 101/54    · Continue home olmesartan (equivalent) and prn PO lasix   · Monitor BP per unit protocol

## 2023-08-16 NOTE — PROGRESS NOTES
[address]  Progress Note  Name: Arnav Weinberg  MRN: [id number]  Unit/Bed#: -28 I Date of Admission: 8/14/2023   Date of Service: 8/15/2023 I Hospital Day: 1    Assessment/Plan   Fall  Assessment & Plan  Patient reports experiencing multiple falls over the past week. She feels that her mechanical and have occurred due to her feeling generally weak. Denies head strike or LOC, and reports that after each fall she was able to lift herself off the ground without assistance. Currently has complaints of some left foot, left shoulder (chronic, being worked up as outpatient), and neck pain, prompting ED visit. Denies other acute symptom/complaint at this time. /54   Pulse 67   Temp 98.7 °F (37.1 °C)   Resp 20   Ht 4' 7" (1.397 m)   Wt 86 kg (189 lb 9.5 oz)   SpO2 91%   BMI 44.07 kg/m²     · Multiple falls over the past week; reportedly mechanical 2/2 generalized weakness  · Has complaints of left foot and neck pain  · Denies head strike, LOC, or prodromal symptom  · Patient able to lift herself from the ground   · CT trauma workup negative for acute traumatic injuries   · PT/OT and case management consulted  · Supportive care and PRN pain control   · Fall precautions     * Acute-on-chronic kidney injury [facility])  Assessment & Plan  Recent Labs     08/14/23  1612 08/15/23  0502   CREATININE 2.26* 1.73*   EGFR 19 26     Estimated Creatinine Clearance: 23.3 mL/min (A) (by C-G formula based on SCr of 1.73 mg/dL (H)).     · Creatinine 2.26; baseline around 1.1   · Patient reports poor oral intake; suspecting prerenal dehydration as cause  · Denies flank/abd pain   · Gentle IVF hydration overnight  · Avoid nephrotoxic agents   · AM BMP    Leukocytosis  Assessment & Plan  Recent Labs     08/14/23  1612 08/15/23  0502   WBC 17.44* 14.45*      · Unsure of cause currently  · Denies infectious S/S  · UA pending collection; follow-up with results  · Monitor for infectious S/S development  · CBC in am    JANICE on CPAP  Assessment & Plan  · qHS CPAP ordered     Coronary artery disease  Assessment & Plan  · Denies chest pain  · Continue home daily ASA and statin    COPD, mild (HCC)  Assessment & Plan  · Does not appear to be in acute exacerbation  · Continue prehosptial inhalers   · Monitor respiratory status     AVB (atrioventricular block)  Assessment & Plan  · 1st degree AV block noted   · EKG today suggestive of such a block vs afib w/aberrant conduction? · On physical exam patient appears to be in a regular rhytm, with some pac's noted on monitor  · Would consider discussion with cardio in AM vs outpatient f/u given patient CHADVASC 4     Iron deficiency anemia secondary to inadequate dietary iron intake  Assessment & Plan  Recent Labs     08/14/23  1612 08/15/23  0502   HGB 10.6* 9.6*       · 11.9 on 07/11/2023  · Denies s/s of bleeding; monitor for development  · AM CBC ordered     Chronic diastolic (congestive) heart failure (HCC)  Assessment & Plan  Wt Readings from Last 3 Encounters:   08/15/23 86 kg (189 lb 9.5 oz)   08/11/23 83.9 kg (185 lb)   04/13/23 85.1 kg (187 lb 9.6 oz)     · Appears euvolemic on admission   · 06/2022 ECHO EF 60%  · Continue home PRN PO lasix   · Monitor for s/s of volume retention         Hypertension  Assessment & Plan  · /50  · Continue home olmesartan (equivalent) and prn PO lasix   · Monitor BP per unit protocol     Acquired hypothyroidism  Assessment & Plan  · Continue home levothyroxine            VTE Pharmacologic Prophylaxis: VTE Score: 5 High Risk (Score >/= 5) - Pharmacological DVT Prophylaxis Ordered: enoxaparin (Lovenox). Sequential Compression Devices Ordered. Patient Centered Rounds: I performed bedside rounds with nursing staff today.   Discussions with Specialists or Other Care Team Provider:  IP CONSULT TO CASE MANAGEMENT    Education and Discussions with Family / Patient: patient, will call family    Time Spent for Care: 76 minutes. More than 50% of total time spent on counseling and coordination of care as described above. Current Length of Stay: 1 day(s)  Current Patient Status: Inpatient   Certification Statement: The patient will continue to require additional inpatient hospital stay due to plan noted above  Discharge Plan: Anticipate discharge in 24-48 hrs to home with home services. Code Status: Level 3 - DNAR and DNI    Subjective:   Offers no new complaints at this time. No acute events reported overnight. Understanding of plan. All questions answered. Does not want to go rehab    Objective:     Vitals:   Temp (24hrs), Av.5 °F (36.9 °C), Min:98.4 °F (36.9 °C), Max:98.7 °F (37.1 °C)    Temp:  [98.4 °F (36.9 °C)-98.7 °F (37.1 °C)] 98.7 °F (37.1 °C)  HR:  [57-69] 67  BP: (101-111)/(48-58) 101/54  SpO2:  [89 %-98 %] 91 %  Body mass index is 44.07 kg/m². Input and Output Summary (last 24 hours): Intake/Output Summary (Last 24 hours) at 8/15/2023 2033  Last data filed at 8/15/2023 1817  Gross per 24 hour   Intake 1200 ml   Output 950 ml   Net 250 ml       Physical Exam:   Physical Exam  Vitals and nursing note reviewed. Constitutional:       General: She is not in acute distress. Appearance: Normal appearance. HENT:      Head: Normocephalic and atraumatic. Right Ear: External ear normal.      Left Ear: External ear normal.      Nose: Nose normal.      Mouth/Throat:      Mouth: Mucous membranes are moist.   Eyes:      Pupils: Pupils are equal, round, and reactive to light. Cardiovascular:      Rate and Rhythm: Normal rate and regular rhythm. Pulses: Normal pulses. Heart sounds: Normal heart sounds. No murmur heard. Pulmonary:      Effort: Pulmonary effort is normal. No respiratory distress. Breath sounds: Normal breath sounds. No wheezing or rales. Chest:      Chest wall: No tenderness. Abdominal:      General: Bowel sounds are normal. There is no distension.       Palpations: Abdomen is soft. There is no mass. Tenderness: There is no abdominal tenderness. There is no guarding. Musculoskeletal:         General: No swelling or tenderness. Cervical back: Normal range of motion and neck supple. No rigidity or tenderness. Right lower leg: No edema. Left lower leg: No edema. Skin:     General: Skin is warm and dry. Capillary Refill: Capillary refill takes less than 2 seconds. Findings: No lesion or rash. Neurological:      General: No focal deficit present. Mental Status: She is alert. Psychiatric:         Mood and Affect: Mood normal.         Additional Data:     Labs:  Results from last 7 days   Lab Units 08/15/23  0502   WBC Thousand/uL 14.45*   HEMOGLOBIN g/dL 9.6*   HEMATOCRIT % 30.9*   PLATELETS Thousands/uL 295   NEUTROS PCT % 82*   LYMPHS PCT % 7*   MONOS PCT % 8   EOS PCT % 2     Results from last 7 days   Lab Units 08/15/23  0502   SODIUM mmol/L 135   POTASSIUM mmol/L 4.1   CHLORIDE mmol/L 105   CO2 mmol/L 23   BUN mg/dL 61*   CREATININE mg/dL 1.73*   ANION GAP mmol/L 7   CALCIUM mg/dL 9.7   GLUCOSE RANDOM mg/dL 132                       Lines/Drains:  Invasive Devices     Peripheral Intravenous Line  Duration           Peripheral IV 08/14/23 Left Antecubital 1 day                      Imaging: Reviewed radiology reports from this admission including: CT head and xray(s)    CT spine cervical without contrast    Result Date: 8/14/2023  Impression: No cervical spine fracture or traumatic malalignment. Workstation performed: [id number]     XR foot 3+ views LEFT    Result Date: 8/14/2023  Impression: No acute osseous abnormality. Workstation performed: [id number]     XR hip/pelv 2-3 vws left    Result Date: 8/14/2023  Impression: Unremarkable appearance of left total hip arthroplasty. Workstation performed: [id number]     CT head without contrast    Result Date: 8/14/2023  Impression: No acute intracranial abnormality.  Workstation performed: [id number]       No Chest XR results available for this patient. Recent Cultures (last 7 days):         Last 24 Hours Medication List:   Current Facility-Administered Medications   Medication Dose Route Frequency Provider Last Rate   • acetaminophen  650 mg Oral Q6H PRN Pierre Bueno PA-C     • albuterol  2 puff Inhalation Q6H PRN Pierre Bueno PA-C     • aspirin  81 mg Oral Daily [facility]     • budesonide-formoterol  2 puff Inhalation BID Pierre Bueno PA-C     • Diclofenac Sodium  2 g Topical 4x Daily Pierre Bueno PA-C     • furosemide  20 mg Oral Daily PRN Pierre Bueno PA-C     • heparin (porcine)  5,000 Units Subcutaneous [facility]     • lactated ringers  75 mL/hr Intravenous Continuous Anjel Villar DO 75 mL/hr (08/15/23 0957)   • levothyroxine  50 mcg Oral Daily Pierre Bueno PA-C     • lidocaine  1 patch Topical Daily Angeles Bauer PA-C     • losartan  [facility] mg [facility]     • oxybutynin  10 mg [facility] Daily [facility]     • oxyCODONE  5 mg Oral Q6H PRN Krea Augustine PA-C     • pantoprazole  40 mg Oral BID AC Pierre Bueno PA-C     • pravastatin  80 mg Oral Daily With Golden Lacy PA-C     • sertraline  100 mg Oral Daily [facility]     • tiZANidine  4 mg Oral Q8H PRN Pierre Bueno PA-C          Today, Patient Was Seen By: Amy Nieves DO    **Please Note: This note may have been constructed using a voice recognition system. **

## 2023-08-16 NOTE — ASSESSMENT & PLAN NOTE
Recent Labs     08/14/23  1612 08/15/23  0502 08/16/23  0507   CREATININE 2.26* 1.73* 1.69*   EGFR 19 26 27     Estimated Creatinine Clearance: 24.1 mL/min (A) (by C-G formula based on SCr of 1.69 mg/dL (H)).     CKD 3B/4    · Creatinine 2.26; baseline around 1.1   · Patient reports poor oral intake; suspecting prerenal dehydration as cause  · Denies flank/abd pain   · Gentle IVF hydration overnight  · Avoid nephrotoxic agents   · AM BMP

## 2023-08-16 NOTE — RESPIRATORY THERAPY NOTE
08/15/23 2223   Respiratory Assessment   Assessment Type Assess only   General Appearance Drowsy   Respiratory Pattern Normal   Chest Assessment Chest expansion symmetrical   Bilateral Breath Sounds Diminished   R Breath Sounds Diminished   L Breath Sounds Diminished   Location Specific No   Cough None   Resp Comments Pt placed on cpap at this time   O2 Device V30   Non-Invasive Information   O2 Interface Device Face mask   Non-Invasive Ventilation Mode CPAP   $ Intermittent NIV Yes   SpO2 94 %   $ Pulse Oximetry Spot Check Charge Completed   Non-Invasive Settings   FiO2 (%) 28   PEEP/CPAP (cm H2O) 8   Rise Time 2   Non-Invasive Readings   Skin Intervention Skin intact   Total Rate 24   Spontaneous Vt (mL) 313   Spontaneous MV (mL) 7.9   Leak (lpm) 20   Non-Invasive Alarms   Low Insp Pressure Time (sec) 20 sec   High Resp Rate (BPM) 45 BPM     RT Ventilator Management Note  Roxie Tolentino 79 y.o. female MRN: [id number]  Unit/Bed#: -01 Encounter: [id number]      Daily Screen    No data found in the last 10 encounters.            Physical Exam:   Assessment Type: Assess only  General Appearance: Drowsy  Respiratory Pattern: Normal  Chest Assessment: Chest expansion symmetrical  Bilateral Breath Sounds: Diminished  R Breath Sounds: Diminished  L Breath Sounds: Diminished  Location Specific: No  Cough: None  O2 Device: V30      Resp Comments: Pt placed on cpap at this time

## 2023-08-16 NOTE — PLAN OF CARE
Problem: OCCUPATIONAL THERAPY ADULT  Goal: Performs self-care activities at highest level of function for planned discharge setting. See evaluation for individualized goals. Description: Treatment Interventions: ADL retraining, Functional transfer training, UE strengthening/ROM, Endurance training, Cognitive reorientation, Patient/family training, Equipment evaluation/education, Compensatory technique education, Activityengagement          See flowsheet documentation for full assessment, interventions and recommendations. Note: Limitation: Decreased ADL status, Decreased UE strength, Decreased UE ROM, Decreased cognition, Decreased endurance, Decreased self-care trans, Decreased high-level ADLs  Prognosis: Good  Assessment: Patient is a 79 y.o. female seen for OT evaluation s/p admit to [address] [place] [address]  on 8/14/2023 w/Acute kidney injury superimposed on CKD ([address] [facility]). Commorbidities affecting patient's functional performance at time of assessment include: hypothyroidism, HTN, obesity, CHF, anemia, COPD, CAD, JANICE on CPAP, fall and leukocytosis. Orders placed for OT evaluation and treatment and up and OOB as tolerated . Performed at least two patient identifiers during session including name and wristband. Prior to admission, Patient reporting being independent with ADLs/IADLs, ambulatory with rollator, and lives alone in a one level house. Personal factors affecting patient at time of initial evaluation include: limited insight into deficits, difficulty performing ADLs and difficulty performing IADLs. Upon evaluation, patient requires minimal  assist for UB ADLs, moderate and maximal assist for LB ADLs, transfers and functional ambulation in room and bathroom with minimal  assist of 2, with the use of Rolling Walker. Patient is oriented to name,, oriented to place, and oriented to situation,.   Occupational performance is affected by the following deficits: limited active ROM, decreased muscle strength, dynamic sit/ stand balance deficit with poor standing tolerance time for self care and functional mobility, decreased activity tolerance, impaired memory, impaired problem solving, increased pain and delayed righting and equilibrium reactions. Patient to benefit from continued Occupational Therapy treatment while in the hospital to address deficits as defined above and maximize level of functional independence with ADLs and functional mobility. Occupational Performance areas to address include: eating, grooming , bathing/ shower, dressing, toilet hygiene, transfer to all surfaces, medication routine/ management, IADLS: Household maintenance, IADLs: safety procedures and IADLs: meal prep/ clean up. From OT standpoint, recommendation at time of d/c would be Post acute rehabilitation services.      OT Discharge Recommendation: Post acute rehabilitation services     [facility] OTD, OTR/L

## 2023-08-16 NOTE — ASSESSMENT & PLAN NOTE
Patient reports experiencing multiple falls over the past week. She feels that her mechanical and have occurred due to her feeling generally weak. Denies head strike or LOC, and reports that after each fall she was able to lift herself off the ground without assistance. Currently has complaints of some left foot, left shoulder (chronic, being worked up as outpatient), and neck pain, prompting ED visit. Denies other acute symptom/complaint at this time.     /51   Pulse (!) 50   Temp (!) 97.2 °F (36.2 °C)   Resp 20   Ht 4' 7" (1.397 m)   Wt 87.8 kg (193 lb 9 oz)   SpO2 97%   BMI 44.99 kg/m²     · Multiple falls over the past week; reportedly mechanical 2/2 generalized weakness  · Has complaints of left foot and neck pain  · Denies head strike, LOC, or prodromal symptom  · Patient able to lift herself from the ground   · CT trauma workup negative for acute traumatic injuries   · PT/OT and case management consulted  · Supportive care and PRN pain control   · Fall precautions

## 2023-08-16 NOTE — ASSESSMENT & PLAN NOTE
Recent Labs     08/14/23  1612 08/15/23  0502   WBC 17.44* 14.45*      · Unsure of cause currently  · Denies infectious S/S  · UA pending collection; follow-up with results  · Monitor for infectious S/S development  · CBC in am

## 2023-08-16 NOTE — ASSESSMENT & PLAN NOTE
Recent Labs     08/14/23  1612 08/15/23  0502 08/16/23  0507   WBC 17.44* 14.45* 13.60*      · Unsure of cause currently  · Denies infectious S/S  · UA pending collection; follow-up with results  · Monitor for infectious S/S development  · CBC in am

## 2023-08-16 NOTE — ASSESSMENT & PLAN NOTE
Patient reports experiencing multiple falls over the past week. She feels that her mechanical and have occurred due to her feeling generally weak. Denies head strike or LOC, and reports that after each fall she was able to lift herself off the ground without assistance. Currently has complaints of some left foot, left shoulder (chronic, being worked up as outpatient), and neck pain, prompting ED visit. Denies other acute symptom/complaint at this time.     /54   Pulse 67   Temp 98.7 °F (37.1 °C)   Resp 20   Ht 4' 7" (1.397 m)   Wt 86 kg (189 lb 9.5 oz)   SpO2 91%   BMI 44.07 kg/m²     · Multiple falls over the past week; reportedly mechanical 2/2 generalized weakness  · Has complaints of left foot and neck pain  · Denies head strike, LOC, or prodromal symptom  · Patient able to lift herself from the ground   · CT trauma workup negative for acute traumatic injuries   · PT/OT and case management consulted  · Supportive care and PRN pain control   · Fall precautions

## 2023-08-17 LAB
ALBUMIN SERPL BCP-MCNC: 3.1 G/DL (ref 3.5–5)
ALP SERPL-CCNC: 212 U/L (ref 34–104)
ALT SERPL W P-5'-P-CCNC: 51 U/L (ref 7–52)
ANION GAP SERPL CALCULATED.3IONS-SCNC: 7 MMOL/L
AST SERPL W P-5'-P-CCNC: 39 U/L (ref 13–39)
BASOPHILS # BLD AUTO: 0.05 THOUSANDS/ÂΜL (ref 0–0.1)
BASOPHILS NFR BLD AUTO: 0 % (ref 0–1)
BILIRUB SERPL-MCNC: 0.63 MG/DL (ref 0.2–1)
BUN SERPL-MCNC: 62 MG/DL (ref 5–25)
CALCIUM ALBUM COR SERPL-MCNC: 10.6 MG/DL (ref 8.3–10.1)
CALCIUM SERPL-MCNC: 9.9 MG/DL (ref 8.4–10.2)
CHLORIDE SERPL-SCNC: 108 MMOL/L (ref 96–108)
CO2 SERPL-SCNC: 24 MMOL/L (ref 21–32)
CREAT SERPL-MCNC: 1.33 MG/DL (ref 0.6–1.3)
EOSINOPHIL # BLD AUTO: 0.47 THOUSAND/ÂΜL (ref 0–0.61)
EOSINOPHIL NFR BLD AUTO: 4 % (ref 0–6)
ERYTHROCYTE [DISTWIDTH] IN BLOOD BY AUTOMATED COUNT: 14.6 % (ref 11.6–15.1)
GFR SERPL CREATININE-BSD FRML MDRD: 36 ML/MIN/1.73SQ M
GLUCOSE SERPL-MCNC: 113 MG/DL (ref 65–140)
GLUCOSE SERPL-MCNC: 295 MG/DL (ref 65–140)
HCT VFR BLD AUTO: 31.2 % (ref 34.8–46.1)
HGB BLD-MCNC: 9.7 G/DL (ref 11.5–15.4)
IMM GRANULOCYTES # BLD AUTO: 0.09 THOUSAND/UL (ref 0–0.2)
IMM GRANULOCYTES NFR BLD AUTO: 1 % (ref 0–2)
LYMPHOCYTES # BLD AUTO: 1.06 THOUSANDS/ÂΜL (ref 0.6–4.47)
LYMPHOCYTES NFR BLD AUTO: 9 % (ref 14–44)
MAGNESIUM SERPL-MCNC: 2 MG/DL (ref 1.9–2.7)
MCH RBC QN AUTO: 28.2 PG (ref 26.8–34.3)
MCHC RBC AUTO-ENTMCNC: 31.1 G/DL (ref 31.4–37.4)
MCV RBC AUTO: 91 FL (ref 82–98)
MONOCYTES # BLD AUTO: 1.11 THOUSAND/ÂΜL (ref 0.17–1.22)
MONOCYTES NFR BLD AUTO: 9 % (ref 4–12)
NEUTROPHILS # BLD AUTO: 9.39 THOUSANDS/ÂΜL (ref 1.85–7.62)
NEUTS SEG NFR BLD AUTO: 77 % (ref 43–75)
NRBC BLD AUTO-RTO: 0 /100 WBCS
PLATELET # BLD AUTO: 352 THOUSANDS/UL (ref 149–390)
PMV BLD AUTO: 9.6 FL (ref 8.9–12.7)
POTASSIUM SERPL-SCNC: 4.4 MMOL/L (ref 3.5–5.3)
PROT SERPL-MCNC: 6 G/DL (ref 6.4–8.4)
RBC # BLD AUTO: 3.44 MILLION/UL (ref 3.81–5.12)
SODIUM SERPL-SCNC: 139 MMOL/L (ref 135–147)
WBC # BLD AUTO: 12.17 THOUSAND/UL (ref 4.31–10.16)

## 2023-08-17 PROCEDURE — 84145 PROCALCITONIN (PCT): CPT | Performed by: PHYSICIAN ASSISTANT

## 2023-08-17 PROCEDURE — 87040 BLOOD CULTURE FOR BACTERIA: CPT | Performed by: PHYSICIAN ASSISTANT

## 2023-08-17 PROCEDURE — 80053 COMPREHEN METABOLIC PANEL: CPT | Performed by: INTERNAL MEDICINE

## 2023-08-17 PROCEDURE — 94760 N-INVAS EAR/PLS OXIMETRY 1: CPT

## 2023-08-17 PROCEDURE — 85025 COMPLETE CBC W/AUTO DIFF WBC: CPT | Performed by: INTERNAL MEDICINE

## 2023-08-17 PROCEDURE — 82948 REAGENT STRIP/BLOOD GLUCOSE: CPT

## 2023-08-17 PROCEDURE — 83735 ASSAY OF MAGNESIUM: CPT | Performed by: INTERNAL MEDICINE

## 2023-08-17 PROCEDURE — 99233 SBSQ HOSP IP/OBS HIGH 50: CPT | Performed by: INTERNAL MEDICINE

## 2023-08-17 RX ORDER — SODIUM CHLORIDE, SODIUM GLUCONATE, SODIUM ACETATE, POTASSIUM CHLORIDE, MAGNESIUM CHLORIDE, SODIUM PHOSPHATE, DIBASIC, AND POTASSIUM PHOSPHATE .53; .5; .37; .037; .03; .012; .00082 G/100ML; G/100ML; G/100ML; G/100ML; G/100ML; G/100ML; G/100ML
50 INJECTION, SOLUTION INTRAVENOUS CONTINUOUS
Status: DISCONTINUED | OUTPATIENT
Start: 2023-08-17 | End: 2023-08-18

## 2023-08-17 RX ORDER — ALBUMIN (HUMAN) 12.5 G/50ML
12.5 SOLUTION INTRAVENOUS ONCE
Status: COMPLETED | OUTPATIENT
Start: 2023-08-17 | End: 2023-08-17

## 2023-08-17 RX ORDER — ALBUMIN (HUMAN) 12.5 G/50ML
25 SOLUTION INTRAVENOUS ONCE
Status: COMPLETED | OUTPATIENT
Start: 2023-08-17 | End: 2023-08-18

## 2023-08-17 RX ADMIN — SODIUM CHLORIDE, SODIUM GLUCONATE, SODIUM ACETATE, POTASSIUM CHLORIDE, MAGNESIUM CHLORIDE, SODIUM PHOSPHATE, DIBASIC, AND POTASSIUM PHOSPHATE 50 ML/HR: .53; .5; .37; .037; .03; .012; .00082 INJECTION, SOLUTION INTRAVENOUS at 11:24

## 2023-08-17 RX ADMIN — TIZANIDINE 4 MG: 2 TABLET ORAL at 16:01

## 2023-08-17 RX ADMIN — PANTOPRAZOLE SODIUM 40 MG: 40 TABLET, DELAYED RELEASE ORAL at 16:01

## 2023-08-17 RX ADMIN — PANTOPRAZOLE SODIUM 40 MG: 40 TABLET, DELAYED RELEASE ORAL at 05:17

## 2023-08-17 RX ADMIN — SODIUM CHLORIDE 500 ML: 0.9 INJECTION, SOLUTION INTRAVENOUS at 19:38

## 2023-08-17 RX ADMIN — ALBUMIN (HUMAN) 12.5 G: 0.25 INJECTION, SOLUTION INTRAVENOUS at 20:52

## 2023-08-17 RX ADMIN — HEPARIN SODIUM 5000 UNITS: 5000 INJECTION INTRAVENOUS; SUBCUTANEOUS at 16:01

## 2023-08-17 RX ADMIN — SODIUM CHLORIDE, SODIUM GLUCONATE, SODIUM ACETATE, POTASSIUM CHLORIDE, MAGNESIUM CHLORIDE, SODIUM PHOSPHATE, DIBASIC, AND POTASSIUM PHOSPHATE 50 ML/HR: .53; .5; .37; .037; .03; .012; .00082 INJECTION, SOLUTION INTRAVENOUS at 05:29

## 2023-08-17 RX ADMIN — ALBUTEROL SULFATE 2 PUFF: 90 AEROSOL, METERED RESPIRATORY (INHALATION) at 19:50

## 2023-08-17 RX ADMIN — Medication 2 G: at 12:49

## 2023-08-17 RX ADMIN — Medication 2 G: at 18:44

## 2023-08-17 RX ADMIN — Medication 2 G: at 09:15

## 2023-08-17 RX ADMIN — PRAVASTATIN SODIUM 80 MG: 80 TABLET ORAL at 16:01

## 2023-08-17 RX ADMIN — BUDESONIDE AND FORMOTEROL FUMARATE DIHYDRATE 2 PUFF: 160; 4.5 AEROSOL RESPIRATORY (INHALATION) at 18:44

## 2023-08-17 RX ADMIN — LIDOCAINE 5% 1 PATCH: 700 PATCH TOPICAL at 09:13

## 2023-08-17 RX ADMIN — HEPARIN SODIUM 5000 UNITS: 5000 INJECTION INTRAVENOUS; SUBCUTANEOUS at 21:27

## 2023-08-17 RX ADMIN — LEVOTHYROXINE SODIUM 50 MCG: 0.05 TABLET ORAL at 05:17

## 2023-08-17 RX ADMIN — HEPARIN SODIUM 5000 UNITS: 5000 INJECTION INTRAVENOUS; SUBCUTANEOUS at 05:17

## 2023-08-17 RX ADMIN — OXYBUTYNIN CHLORIDE 10 MG: 5 TABLET, EXTENDED RELEASE ORAL at 09:15

## 2023-08-17 RX ADMIN — ASPIRIN 81 MG: 81 TABLET, COATED ORAL at 09:14

## 2023-08-17 RX ADMIN — SERTRALINE HYDROCHLORIDE 100 MG: 100 TABLET ORAL at 09:14

## 2023-08-17 RX ADMIN — BUDESONIDE AND FORMOTEROL FUMARATE DIHYDRATE 2 PUFF: 160; 4.5 AEROSOL RESPIRATORY (INHALATION) at 09:15

## 2023-08-17 RX ADMIN — Medication 2 G: at 21:28

## 2023-08-17 NOTE — ASSESSMENT & PLAN NOTE
Recent Labs     08/15/23  0502 08/16/23  0507 08/17/23  0450   HGB 9.6* 9.9* 9.7*     Lab Results   Component Value Date    IRON 16 (L) 08/15/2023    FERRITIN 207 08/15/2023    TIBC 232 (L) 08/15/2023    CONCFE 7 (L) 08/15/2023       · 11.9 on 07/11/2023  · Denies s/s of bleeding; monitor for development  · Iron QOD  · AM CBC ordered

## 2023-08-17 NOTE — PROGRESS NOTES
[address]  Progress Note  Name: German Bradley  MRN: [id number]  Unit/Bed#: -70 I Date of Admission: 8/14/2023   Date of Service: 8/17/2023 I Hospital Day: 3    Assessment/Plan   Fall  Assessment & Plan  Patient reports experiencing multiple falls over the past week. She feels that her mechanical and have occurred due to her feeling generally weak. Denies head strike or LOC, and reports that after each fall she was able to lift herself off the ground without assistance. Currently has complaints of some left foot, left shoulder (chronic, being worked up as outpatient), and neck pain, prompting ED visit. Denies other acute symptom/complaint at this time. /57   Pulse 62   Temp 97.9 °F (36.6 °C)   Resp 18   Ht 4' 7" (1.397 m)   Wt 87.2 kg (192 lb 3.9 oz)   SpO2 100%   BMI 44.68 kg/m²     · Multiple falls over the past week; reportedly mechanical 2/2 generalized weakness  · Has complaints of left foot and neck pain  · Denies head strike, LOC, or prodromal symptom  · CT trauma workup negative for acute traumatic injuries   · PT/OT and case management consulted  · Recommend rehab, opting for home with home health services  · After discussion with patient's caregiver who is her neighbor, patient now agreeable to rehab  · Supportive care and PRN pain control   · Fall precautions     * Acute kidney injury superimposed on CKD [facility])  Assessment & Plan  Recent Labs     08/15/23  0502 08/16/23  0507 08/17/23  0450   CREATININE 1.73* 1.69* 1.33*   EGFR 26 27 36     Estimated Creatinine Clearance: 30.5 mL/min (A) (by C-G formula based on SCr of 1.33 mg/dL (H)).     CKD Stage 3B/4    · Creatinine 2.26; baseline around 1-1.1  · Patient reports poor oral intake; suspecting prerenal dehydration as cause  · Denies flank/abd pain   · Additional day of IVF hydration gentle  · Avoid nephrotoxic agents   · AM BMP    Leukocytosis  Assessment & Plan  Recent Labs     08/15/23  0502 08/16/23  0507 08/17/23  0450   WBC 14.45* 13.60* 12.17*      · Unsure of cause currently  · Denies infectious S/S  · UA showing 4-10 WBC but no bacteria  · Monitor for infectious S/S development  · CBC in am    JANICE on CPAP  Assessment & Plan  · qHS CPAP ordered     Coronary artery disease involving native coronary artery of native heart without angina pectoris  Assessment & Plan  · Denies chest pain  · Continue home daily ASA and statin    COPD, mild (HCC)  Assessment & Plan  · Does not appear to be in acute exacerbation  · Continue prehosptial inhalers   · Monitor respiratory status     AVB (atrioventricular block)  Assessment & Plan  · 1st degree AV block noted   · EKG today suggestive of such a block vs afib w/aberrant conduction? · On physical exam patient appears to be in a regular rhytm, with some pac's noted on monitor  · Would consider discussion with cardio in AM vs outpatient f/u given patient CHADVASC 4     Iron deficiency anemia secondary to inadequate dietary iron intake  Assessment & Plan  Recent Labs     08/15/23  0502 08/16/23  0507 08/17/23  0450   HGB 9.6* 9.9* 9.7*     Lab Results   Component Value Date    IRON 16 (L) 08/15/2023    FERRITIN 207 08/15/2023    TIBC 232 (L) 08/15/2023    CONCFE 7 (L) 08/15/2023       · 11.9 on 07/11/2023  · Denies s/s of bleeding; monitor for development  · Iron QOD  · AM CBC ordered     Chronic diastolic (congestive) heart failure (HCC)  Assessment & Plan  Wt Readings from Last 3 Encounters:   08/17/23 87.2 kg (192 lb 3.9 oz)   08/11/23 83.9 kg (185 lb)   04/13/23 85.1 kg (187 lb 9.6 oz)     · Appears euvolemic on admission   · 06/2022 ECHO EF 60%  · Monitor for s/s of volume retention   · Home Lasix held      Class 3 severe obesity due to excess calories with serious comorbidity and body mass index (BMI) of 45.0 to 49.9 in adult [facility])  Assessment & Plan  Body mass index is 44.68 kg/m².     • Recommend incorporating a more whole foods plant-predominant diet along with decreasing consumption of red meats and processed foods  • Per AHA guidelines, recommend moderate-vigorous intensity exercise for 30 minutes a day for 5 days a week or a total of 150 min/week        Primary hypertension  Assessment & Plan  Blood Pressure: 103/57    · Hold home olmesartan and lasix  · Monitor BP per unit protocol     Acquired hypothyroidism  Assessment & Plan  Lab Results   Component Value Date    [id number] 1.235 2023      · Continue home levothyroxine              VTE Pharmacologic Prophylaxis: VTE Score: 5 High Risk (Score >/= 5) - Pharmacological DVT Prophylaxis Ordered: heparin. Sequential Compression Devices Ordered. Patient Centered Rounds: I performed bedside rounds with nursing staff today. Discussions with Specialists or Other Care Team Provider:  IP CONSULT TO CASE MANAGEMENT    Education and Discussions with Family / Patient: patient, caregiver/neighbor    Time Spent for Care: 75 minutes. More than 50% of total time spent on counseling and coordination of care as described above. Current Length of Stay: 3 day(s)  Current Patient Status: Inpatient   Certification Statement: The patient will continue to require additional inpatient hospital stay due to plan noted above  Discharge Plan: Anticipate discharge in 24-48 hrs to rehab facility. Code Status: Level 3 - DNAR and DNI    Subjective:   Offers no new complaints at this time. No acute events reported overnight. Understanding of plan. All questions answered. Objective:     Vitals:   Temp (24hrs), Av.9 °F (36.6 °C), Min:97.8 °F (36.6 °C), Max:97.9 °F (36.6 °C)    Temp:  [97.8 °F (36.6 °C)-97.9 °F (36.6 °C)] 97.9 °F (36.6 °C)  HR:  [56-62] 62  Resp:  [18] 18  BP: (103-105)/(53-57) 103/57  SpO2:  [94 %-100 %] 100 %  Body mass index is 44.68 kg/m². Input and Output Summary (last 24 hours):      Intake/Output Summary (Last 24 hours) at 2023 1540  Last data filed at 2023 0925  Gross per 24 hour   Intake 1086.67 ml   Output --   Net 1086.67 ml       Physical Exam:   Physical Exam  Vitals and nursing note reviewed. Constitutional:       General: She is not in acute distress. Appearance: Normal appearance. HENT:      Head: Normocephalic and atraumatic. Right Ear: External ear normal.      Left Ear: External ear normal.      Nose: Nose normal.      Mouth/Throat:      Mouth: Mucous membranes are moist.   Eyes:      Pupils: Pupils are equal, round, and reactive to light. Cardiovascular:      Rate and Rhythm: Normal rate and regular rhythm. Pulses: Normal pulses. Heart sounds: Normal heart sounds. No murmur heard. Pulmonary:      Effort: Pulmonary effort is normal. No respiratory distress. Breath sounds: Normal breath sounds. No wheezing or rales. Chest:      Chest wall: No tenderness. Abdominal:      General: Bowel sounds are normal. There is no distension. Palpations: Abdomen is soft. There is no mass. Tenderness: There is no abdominal tenderness. There is no guarding. Musculoskeletal:         General: No swelling or tenderness. Cervical back: Normal range of motion and neck supple. No rigidity or tenderness. Right lower leg: No edema. Left lower leg: No edema. Skin:     General: Skin is warm and dry. Capillary Refill: Capillary refill takes less than 2 seconds. Findings: No lesion or rash. Neurological:      General: No focal deficit present. Mental Status: She is alert.    Psychiatric:         Mood and Affect: Mood normal.       Additional Data:     Labs:  Results from last 7 days   Lab Units 08/17/23  0450   WBC Thousand/uL 12.17*   HEMOGLOBIN g/dL 9.7*   HEMATOCRIT % 31.2*   PLATELETS Thousands/uL 352   NEUTROS PCT % 77*   LYMPHS PCT % 9*   MONOS PCT % 9   EOS PCT % 4     Results from last 7 days   Lab Units 08/17/23  0450   SODIUM mmol/L 139   POTASSIUM mmol/L 4.4   CHLORIDE mmol/L 108   CO2 mmol/L 24   BUN mg/dL 62*   CREATININE mg/dL 1.33* ANION GAP mmol/L 7   CALCIUM mg/dL 9.9   ALBUMIN g/dL 3.1*   TOTAL BILIRUBIN mg/dL 0.63   ALK PHOS U/L 212*   ALT U/L 51   AST U/L 39   GLUCOSE RANDOM mg/dL 113                       Lines/Drains:  Invasive Devices     Peripheral Intravenous Line  Duration           Peripheral IV 08/14/23 Left Antecubital 2 days    Peripheral IV 08/16/23 Dorsal (posterior); Left Forearm 1 day                      Imaging: Reviewed radiology reports from this admission including: CT head and xray(s)    CT spine cervical without contrast    Result Date: 8/14/2023  Impression: No cervical spine fracture or traumatic malalignment. Workstation performed: [id number]     XR foot 3+ views LEFT    Result Date: 8/14/2023  Impression: No acute osseous abnormality. Workstation performed: [id number]     XR hip/pelv 2-3 vws left    Result Date: 8/14/2023  Impression: Unremarkable appearance of left total hip arthroplasty. Workstation performed: [id number]     CT head without contrast    Result Date: 8/14/2023  Impression: No acute intracranial abnormality. Workstation performed: [id number]       No Chest XR results available for this patient.      Recent Cultures (last 7 days):         Last 24 Hours Medication List:   Current Facility-Administered Medications   Medication Dose Route Frequency Provider Last Rate   • acetaminophen  650 mg Oral Q6H PRN Chip Proper, PA-C     • albuterol  2 puff Inhalation Q6H PRN Chip Proper, PA-C     • aspirin  81 mg Oral Daily [place], [place]     • budesonide-formoterol  2 puff Inhalation BID Chip Proper, PA-C     • Diclofenac Sodium  2 g Topical 4x Daily [facility]     • ferrous sulfate  325 mg Oral Every Other Day Anjel Villar DO     • furosemide  20 mg Oral Daily PRN Chip Proper, PA-C     • heparin (porcine)  5,000 Units Subcutaneous [place], [place]     • hydrALAZINE  10 mg Intravenous Q6H PRN Anjel Villar DO     • labetalol  10 mg Intravenous Q6H PRN Adrpatricia Burns, DO     • levothyroxine  50 mcg Oral Daily Coco Pimentel PA-C     • lidocaine  1 patch Topical Daily Dueperry Cortez PA-C     • multi-electrolyte  50 mL/hr Intravenous Continuous Anjeldominic Villar, DO 50 mL/hr (08/17/23 1124)   • oxybutynin  10 mg Oral Daily Aurther Cogan, PA-C     • oxyCODONE  5 mg Oral Q6H PRN Ludy Cortez PA-C     • pantoprazole  40 mg Oral BID AC Aurther Cogan, PA-C     • pravastatin  80 mg Oral Daily With Golden Lacy PA-C     • sertraline  100 mg Oral Daily Aurther Cogan, [place]     • tiZANidine  4 mg Oral Q8H PRN Aurther Cogan, PA-C          Today, Patient Was Seen By: Cristina Burns, DO    **Please Note: This note may have been constructed using a voice recognition system. **

## 2023-08-17 NOTE — ASSESSMENT & PLAN NOTE
Recent Labs     08/15/23  0502 08/16/23  0507 08/17/23  0450   WBC 14.45* 13.60* 12.17*      · Unsure of cause currently  · Denies infectious S/S  · UA showing 4-10 WBC but no bacteria  · Monitor for infectious S/S development  · CBC in am

## 2023-08-17 NOTE — PLAN OF CARE
Problem: SAFETY ADULT  Goal: Patient will remain free of falls  Description: INTERVENTIONS:  - Educate patient/family on patient safety including physical limitations  - Instruct patient to call for assistance with activity   - Consult OT/PT to assist with strengthening/mobility   - Keep Call bell within reach  - Keep bed low and locked with side rails adjusted as appropriate  - Keep care items and personal belongings within reach  - Initiate and maintain comfort rounds  - Make Fall Risk Sign visible to staff  - Offer Toileting every 2 Hours, in advance of need  - Initiate/Maintain bed alarm  - Apply yellow socks and bracelet for high fall risk patients  - Consider moving patient to room near nurses station  Outcome: Progressing     Problem: Prexisting or High Potential for Compromised Skin Integrity  Goal: Skin integrity is maintained or improved  Description: INTERVENTIONS:  - Identify patients at risk for skin breakdown  - Assess and monitor skin integrity  - Assess and monitor nutrition and hydration status  - Monitor labs   - Assess for incontinence   - Turn and reposition patient  - Assist with mobility/ambulation  - Relieve pressure over bony prominences  - Avoid friction and shearing  - Provide appropriate hygiene as needed including keeping skin clean and dry  - Evaluate need for skin moisturizer/barrier cream  - Collaborate with interdisciplinary team   - Patient/family teaching  - Consider wound care consult   Outcome: Progressing

## 2023-08-17 NOTE — ASSESSMENT & PLAN NOTE
Patient reports experiencing multiple falls over the past week. She feels that her mechanical and have occurred due to her feeling generally weak. Denies head strike or LOC, and reports that after each fall she was able to lift herself off the ground without assistance. Currently has complaints of some left foot, left shoulder (chronic, being worked up as outpatient), and neck pain, prompting ED visit. Denies other acute symptom/complaint at this time.     /57   Pulse 62   Temp 97.9 °F (36.6 °C)   Resp 18   Ht 4' 7" (1.397 m)   Wt 87.2 kg (192 lb 3.9 oz)   SpO2 100%   BMI 44.68 kg/m²     · Multiple falls over the past week; reportedly mechanical 2/2 generalized weakness  · Has complaints of left foot and neck pain  · Denies head strike, LOC, or prodromal symptom  · CT trauma workup negative for acute traumatic injuries   · PT/OT and case management consulted  · Recommend rehab, opting for home with home health services  · After discussion with patient's caregiver who is her neighbor, patient now agreeable to rehab  · Supportive care and PRN pain control   · Fall precautions

## 2023-08-17 NOTE — ASSESSMENT & PLAN NOTE
Lab Results   Component Value Date    [id number] 1.235 07/11/2023      · Continue home levothyroxine

## 2023-08-17 NOTE — CASE MANAGEMENT
Case Management Discharge Planning Note    Patient name Cruznancy Garciaet  Location /-56 MRN [id number]  : 1940 Date 2023       Current Admission Date: 2023  Current Admission Diagnosis:Acute kidney injury superimposed on CKD [facility])   Patient Active Problem List    Diagnosis Date Noted   • Fall 2023   • Acute kidney injury superimposed on CKD ([address]) 2023   • Leukocytosis 2023   • Orbital mass 2023   • Urinary incontinence 10/26/2022   • 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2022   • Stage 3a chronic kidney disease ([address]) 2022   • Radiculopathy, lumbar region 07/10/2021   • Depression, recurrent ([address]) 2021   • Osteopenia 10/22/2020   • Insomnia 2020   • Depression with anxiety 2019   • 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/2018   • Arthritis    • AVB (atrioventricular block)    • COPD, mild (HCC)    • Coronary artery disease involving native coronary artery of native heart without angina pectoris    • JANICE on CPAP    • Gastroesophageal reflux disease without esophagitis 2018   • Iron deficiency anemia secondary to inadequate dietary iron intake 2018   • Obesity, morbid ([address]) 2018   • Chronic diastolic (congestive) heart failure ([address]) 2018   • Reactive airway disease without complication    • JANICE (obstructive sleep apnea) 2018   • Hyperlipidemia 2017   • Primary hypertension 2017   • Acquired hypothyroidism 10/29/2017   • LVH (left ventricular hypertrophy) 2016   • Mitral annular calcification 2016   • Mitral valve stenosis 2016   • Pulmonary hypertension ([address]) 2016   • Class 3 severe obesity due to excess calories with serious comorbidity and body mass index (BMI) of 45.0 to 49.9 in adult ([address]) 2016      LOS (days): 3  Geometric Mean LOS (GMLOS) (days): 3.10  Days to GMLOS:0.4     OBJECTIVE:  Risk of Unplanned Readmission Score: 19         Current admission status: Inpatient   Preferred Pharmacy:   [place].  [address] [address]  Phone: [phone] Fax: [phone]    Primary Care Provider: MABEL Maciel    Primary Insurance: MEDICARE  Secondary Insurance:     DISCHARGE DETAILS:    Discharge planning discussed with[de-identified] Patient at bedside  Freedom of Choice: Yes  Comments - Freedom of Choice: CM discussed freedom of choice as it pertains to discharge planning. Patient is agreeable to cm sending rehab referrals. CM contacted family/caregiver?: No- see comments (declined)  Were Treatment Team discharge recommendations reviewed with patient/caregiver?: Yes  Did patient/caregiver verbalize understanding of patient care needs?: Yes            Requested [address] [facility]         Is the patient interested in [facility] at discharge?: No    DME Referral Provided  Referral made for DME?: No    Other Referral/Resources/Interventions Provided:  Interventions: SNF  Referral Comments: CM sent referral to SNF    Would you like to participate in our [address] service program?  : No - Declined    Treatment Team Recommendation: Home, Short Term Rehab  Discharge Destination Plan[de-identified] Short Term Rehab  Transport at Discharge : [address], [address] [facility]         IMM Given (Date):: 08/17/23  IMM Given to[de-identified] Patient (CM reviewed IMM with patient at bedside. Patient reported understanding their rights and denied any questions or concerns.  Patient was given a copy and signed copy was placed in medical records.)

## 2023-08-17 NOTE — QUICK NOTE
Notified patient with BP of 66/45 on my small, but recheck manually is 98/62, patient with cold sweat. All other vital signs stable, but will recheck temperature at this time as well.     Will give a one-time IV fluid bolus 500 mL now for hypotension and recheck BP manually once bolus complete no

## 2023-08-17 NOTE — RESPIRATORY THERAPY NOTE
RT Protocol Note  Jesus Aj 79 y.o. female MRN: [id number]  Unit/Bed#: -01 Encounter: [id number]    Assessment    Principal Problem:    Acute kidney injury superimposed on CKD [facility])  Active Problems:    Acquired hypothyroidism    Primary hypertension    Class 3 severe obesity due to excess calories with serious comorbidity and body mass index (BMI) of 45.0 to 49.9 in adult [facility])    Chronic diastolic (congestive) heart failure (HCC)    Iron deficiency anemia secondary to inadequate dietary iron intake    AVB (atrioventricular block)    COPD, mild (HCC)    Coronary artery disease involving native coronary artery of native heart without angina pectoris    JANICE on CPAP    Fall    Leukocytosis    Physical Exam:   Assessment Type: Assess only  General Appearance: Sleeping  Respiratory Pattern: Normal  Chest Assessment: Chest expansion symmetrical  O2 Device: ra    Vitals:  Blood pressure 96/50, pulse 91, temperature 99.9 °F (37.7 °C), resp. rate 17, height 4' 7" (1.397 m), weight 87.2 kg (192 lb 3.9 oz), SpO2 90 %, not currently breastfeeding. O2 Device: ra     Plan    Respiratory Plan: Home Bronchodilator Patient pathway        Resp Comments: will cont txs as scheduled PRN. pt resting, no distress noted. 08/17/23 1548   Respiratory Protocol   Protocol Initiated? No   Protocol Selection Respiratory   Language Barrier? No   Medical & Social History Reviewed? Yes   Diagnostic Studies Reviewed? Yes   Physical Assessment Performed? Yes   Respiratory Plan Home Bronchodilator Patient pathway   Respiratory Assessment   Assessment Type Assess only   General Appearance Sleeping   Respiratory Pattern Normal   Chest Assessment Chest expansion symmetrical   Resp Comments will cont txs as scheduled PRN. pt resting, no distress noted.    O2 Device ra   Additional Assessments   Pulse 67   Respirations 17   SpO2 97 %

## 2023-08-17 NOTE — ASSESSMENT & PLAN NOTE
Body mass index is 44.68 kg/m².     • Recommend incorporating a more whole foods plant-predominant diet along with decreasing consumption of red meats and processed foods  • Per AHA guidelines, recommend moderate-vigorous intensity exercise for 30 minutes a day for 5 days a week or a total of 150 min/week

## 2023-08-17 NOTE — ASSESSMENT & PLAN NOTE
Wt Readings from Last 3 Encounters:   08/17/23 87.2 kg (192 lb 3.9 oz)   08/11/23 83.9 kg (185 lb)   04/13/23 85.1 kg (187 lb 9.6 oz)     · Appears euvolemic on admission   · 06/2022 ECHO EF 60%  · Monitor for s/s of volume retention   · Home Lasix held

## 2023-08-17 NOTE — PLAN OF CARE
Problem: SAFETY ADULT  Goal: Patient will remain free of falls  Description: INTERVENTIONS:  - Educate patient/family on patient safety including physical limitations  - Instruct patient to call for assistance with activity   - Consult OT/PT to assist with strengthening/mobility   - Keep Call bell within reach  - Keep bed low and locked with side rails adjusted as appropriate  - Keep care items and personal belongings within reach  - Initiate and maintain comfort rounds  - Make Fall Risk Sign visible to staff  - Apply yellow socks and bracelet for high fall risk patients  - Consider moving patient to room near nurses station  Outcome: Progressing  Goal: Maintain or return to baseline ADL function  Description: INTERVENTIONS:  -  Assess patient's ability to carry out ADLs; assess patient's baseline for ADL function and identify physical deficits which impact ability to perform ADLs (bathing, care of mouth/teeth, toileting, grooming, dressing, etc.)  - Assess/evaluate cause of self-care deficits   - Assess range of motion  - Assess patient's mobility; develop plan if impaired  - Assess patient's need for assistive devices and provide as appropriate  - Encourage maximum independence but intervene and supervise when necessary  - Involve family in performance of ADLs  - Assess for home care needs following discharge   - Consider OT consult to assist with ADL evaluation and planning for discharge  - Provide patient education as appropriate  Outcome: Progressing     Problem: Prexisting or High Potential for Compromised Skin Integrity  Goal: Skin integrity is maintained or improved  Description: INTERVENTIONS:  - Identify patients at risk for skin breakdown  - Assess and monitor skin integrity  - Assess and monitor nutrition and hydration status  - Monitor labs   - Assess for incontinence   - Turn and reposition patient  - Assist with mobility/ambulation  - Relieve pressure over bony prominences  - Avoid friction and shearing  - Provide appropriate hygiene as needed including keeping skin clean and dry  - Evaluate need for skin moisturizer/barrier cream  - Collaborate with interdisciplinary team   - Patient/family teaching  - Consider wound care consult   Outcome: Progressing     Problem: MOBILITY - ADULT  Goal: Maintain or return to baseline ADL function  Description: INTERVENTIONS:  -  Assess patient's ability to carry out ADLs; assess patient's baseline for ADL function and identify physical deficits which impact ability to perform ADLs (bathing, care of mouth/teeth, toileting, grooming, dressing, etc.)  - Assess/evaluate cause of self-care deficits   - Assess range of motion  - Assess patient's mobility; develop plan if impaired  - Assess patient's need for assistive devices and provide as appropriate  - Encourage maximum independence but intervene and supervise when necessary  - Involve family in performance of ADLs  - Assess for home care needs following discharge   - Consider OT consult to assist with ADL evaluation and planning for discharge  - Provide patient education as appropriate  Outcome: Progressing  Goal: Maintains/Returns to pre admission functional level  Description: INTERVENTIONS:  - Perform BMAT or MOVE assessment daily.   - Set and communicate daily mobility goal to care team and patient/family/caregiver.    - Collaborate with rehabilitation services on mobility goals if consulted  - Out of bed for toileting  - Record patient progress and toleration of activity level   Outcome: Progressing

## 2023-08-18 VITALS
SYSTOLIC BLOOD PRESSURE: 137 MMHG | RESPIRATION RATE: 16 BRPM | TEMPERATURE: 98.5 F | WEIGHT: 203.04 LBS | HEIGHT: 55 IN | OXYGEN SATURATION: 98 % | HEART RATE: 56 BPM | BODY MASS INDEX: 46.99 KG/M2 | DIASTOLIC BLOOD PRESSURE: 65 MMHG

## 2023-08-18 LAB
ANION GAP SERPL CALCULATED.3IONS-SCNC: 9 MMOL/L
BUN SERPL-MCNC: 56 MG/DL (ref 5–25)
CALCIUM SERPL-MCNC: 9.8 MG/DL (ref 8.4–10.2)
CHLORIDE SERPL-SCNC: 109 MMOL/L (ref 96–108)
CO2 SERPL-SCNC: 23 MMOL/L (ref 21–32)
CREAT SERPL-MCNC: 1.62 MG/DL (ref 0.6–1.3)
ERYTHROCYTE [DISTWIDTH] IN BLOOD BY AUTOMATED COUNT: 14.7 % (ref 11.6–15.1)
EST. AVERAGE GLUCOSE BLD GHB EST-MCNC: 128 MG/DL
FLUAV RNA RESP QL NAA+PROBE: NEGATIVE
FLUBV RNA RESP QL NAA+PROBE: NEGATIVE
GFR SERPL CREATININE-BSD FRML MDRD: 29 ML/MIN/1.73SQ M
GLUCOSE SERPL-MCNC: 89 MG/DL (ref 65–140)
HBA1C MFR BLD: 6.1 %
HCT VFR BLD AUTO: 31.1 % (ref 34.8–46.1)
HGB BLD-MCNC: 9.5 G/DL (ref 11.5–15.4)
LACTATE SERPL-SCNC: 1.1 MMOL/L (ref 0.5–2)
MCH RBC QN AUTO: 27.8 PG (ref 26.8–34.3)
MCHC RBC AUTO-ENTMCNC: 30.5 G/DL (ref 31.4–37.4)
MCV RBC AUTO: 91 FL (ref 82–98)
PLATELET # BLD AUTO: 358 THOUSANDS/UL (ref 149–390)
PMV BLD AUTO: 9.6 FL (ref 8.9–12.7)
POTASSIUM SERPL-SCNC: 4.2 MMOL/L (ref 3.5–5.3)
PROCALCITONIN SERPL-MCNC: 0.47 NG/ML
RBC # BLD AUTO: 3.42 MILLION/UL (ref 3.81–5.12)
RSV RNA RESP QL NAA+PROBE: NEGATIVE
SARS-COV-2 RNA RESP QL NAA+PROBE: NEGATIVE
SODIUM SERPL-SCNC: 141 MMOL/L (ref 135–147)
WBC # BLD AUTO: 11.14 THOUSAND/UL (ref 4.31–10.16)

## 2023-08-18 PROCEDURE — 94760 N-INVAS EAR/PLS OXIMETRY 1: CPT

## 2023-08-18 PROCEDURE — 83036 HEMOGLOBIN GLYCOSYLATED A1C: CPT | Performed by: PHYSICIAN ASSISTANT

## 2023-08-18 PROCEDURE — 80048 BASIC METABOLIC PNL TOTAL CA: CPT | Performed by: INTERNAL MEDICINE

## 2023-08-18 PROCEDURE — 99239 HOSP IP/OBS DSCHRG MGMT >30: CPT | Performed by: INTERNAL MEDICINE

## 2023-08-18 PROCEDURE — 94664 DEMO&/EVAL PT USE INHALER: CPT

## 2023-08-18 PROCEDURE — 83605 ASSAY OF LACTIC ACID: CPT | Performed by: PHYSICIAN ASSISTANT

## 2023-08-18 PROCEDURE — 85027 COMPLETE CBC AUTOMATED: CPT | Performed by: INTERNAL MEDICINE

## 2023-08-18 PROCEDURE — 0241U HB NFCT DS VIR RESP RNA 4 TRGT: CPT | Performed by: INTERNAL MEDICINE

## 2023-08-18 RX ORDER — FUROSEMIDE 10 MG/ML
20 INJECTION INTRAMUSCULAR; INTRAVENOUS ONCE
Status: COMPLETED | OUTPATIENT
Start: 2023-08-18 | End: 2023-08-18

## 2023-08-18 RX ORDER — FUROSEMIDE 20 MG/1
20 TABLET ORAL DAILY PRN
Qty: 30 TABLET | Refills: 0
Start: 2023-08-18

## 2023-08-18 RX ORDER — LIDOCAINE 50 MG/G
1 PATCH TOPICAL DAILY
Qty: 15 PATCH | Refills: 0
Start: 2023-08-19

## 2023-08-18 RX ORDER — FERROUS SULFATE 325(65) MG
325 TABLET ORAL EVERY OTHER DAY
Qty: 30 TABLET | Refills: 0
Start: 2023-08-20

## 2023-08-18 RX ADMIN — SERTRALINE HYDROCHLORIDE 100 MG: 100 TABLET ORAL at 10:51

## 2023-08-18 RX ADMIN — OXYBUTYNIN CHLORIDE 10 MG: 5 TABLET, EXTENDED RELEASE ORAL at 10:51

## 2023-08-18 RX ADMIN — BUDESONIDE AND FORMOTEROL FUMARATE DIHYDRATE 2 PUFF: 160; 4.5 AEROSOL RESPIRATORY (INHALATION) at 19:09

## 2023-08-18 RX ADMIN — ACETAMINOPHEN 650 MG: 325 TABLET, FILM COATED ORAL at 15:05

## 2023-08-18 RX ADMIN — OXYCODONE HYDROCHLORIDE 5 MG: 5 TABLET ORAL at 11:15

## 2023-08-18 RX ADMIN — HEPARIN SODIUM 5000 UNITS: 5000 INJECTION INTRAVENOUS; SUBCUTANEOUS at 14:57

## 2023-08-18 RX ADMIN — BUDESONIDE AND FORMOTEROL FUMARATE DIHYDRATE 2 PUFF: 160; 4.5 AEROSOL RESPIRATORY (INHALATION) at 10:53

## 2023-08-18 RX ADMIN — TIZANIDINE 4 MG: 2 TABLET ORAL at 15:06

## 2023-08-18 RX ADMIN — FERROUS SULFATE TAB 325 MG (65 MG ELEMENTAL FE) 325 MG: 325 (65 FE) TAB at 10:51

## 2023-08-18 RX ADMIN — Medication 2 G: at 19:10

## 2023-08-18 RX ADMIN — SODIUM CHLORIDE, SODIUM GLUCONATE, SODIUM ACETATE, POTASSIUM CHLORIDE, MAGNESIUM CHLORIDE, SODIUM PHOSPHATE, DIBASIC, AND POTASSIUM PHOSPHATE 50 ML/HR: .53; .5; .37; .037; .03; .012; .00082 INJECTION, SOLUTION INTRAVENOUS at 05:33

## 2023-08-18 RX ADMIN — LEVOTHYROXINE SODIUM 50 MCG: 0.05 TABLET ORAL at 05:34

## 2023-08-18 RX ADMIN — PANTOPRAZOLE SODIUM 40 MG: 40 TABLET, DELAYED RELEASE ORAL at 10:51

## 2023-08-18 RX ADMIN — ALBUMIN (HUMAN) 25 G: 0.25 INJECTION, SOLUTION INTRAVENOUS at 00:14

## 2023-08-18 RX ADMIN — HEPARIN SODIUM 5000 UNITS: 5000 INJECTION INTRAVENOUS; SUBCUTANEOUS at 05:34

## 2023-08-18 RX ADMIN — PANTOPRAZOLE SODIUM 40 MG: 40 TABLET, DELAYED RELEASE ORAL at 15:09

## 2023-08-18 RX ADMIN — LIDOCAINE 5% 1 PATCH: 700 PATCH TOPICAL at 10:54

## 2023-08-18 RX ADMIN — ASPIRIN 81 MG: 81 TABLET, COATED ORAL at 10:51

## 2023-08-18 RX ADMIN — Medication 2 G: at 10:57

## 2023-08-18 RX ADMIN — FUROSEMIDE 20 MG: 10 INJECTION, SOLUTION INTRAMUSCULAR; INTRAVENOUS at 10:59

## 2023-08-18 RX ADMIN — Medication 2 G: at 14:57

## 2023-08-18 RX ADMIN — PRAVASTATIN SODIUM 80 MG: 80 TABLET ORAL at 15:56

## 2023-08-18 NOTE — ASSESSMENT & PLAN NOTE
Recent Labs     08/16/23  0507 08/17/23  0450 08/18/23  0432   WBC 13.60* 12.17* 11.14*      · Unsure of cause currently  · Denies infectious S/S  · UA showing 4-10 WBC but no bacteria  · Monitor for infectious S/S development  · CBC in am

## 2023-08-18 NOTE — RESPIRATORY THERAPY NOTE
RT Protocol Note  Ronaldo Ng 79 y.o. female MRN: [id number]  Unit/Bed#: -01 Encounter: [id number]    Assessment    Principal Problem:    Acute kidney injury superimposed on CKD [facility])  Active Problems:    Acquired hypothyroidism    Primary hypertension    Class 3 severe obesity due to excess calories with serious comorbidity and body mass index (BMI) of 45.0 to 49.9 in adult [facility])    Chronic diastolic (congestive) heart failure (HCC)    Iron deficiency anemia secondary to inadequate dietary iron intake    AVB (atrioventricular block)    COPD, mild (HCC)    Coronary artery disease involving native coronary artery of native heart without angina pectoris    JANICE on CPAP    Fall    Leukocytosis      Home Pulmonary Medications:  Albuterol  symbicort       Past Medical History:   Diagnosis Date    Anemia 08/22/2018    Anxiety     Arthritis     AVB (atrioventricular block)     first degree    Cataract     CHF (congestive heart failure) (HCC)     COPD, mild (HCC)     Coronary artery disease     Dislocation of right shoulder joint     Frequent UTI     GERD (gastroesophageal reflux disease)     H/O: pneumonia     Heme positive stool     Hyperlipidemia     Hypertension     Hypothyroidism     Morbid obesity with BMI of 50.0-59.9, adult ([address])     Obesity, morbid ([address]) 08/22/2018    JANICE on CPAP     Pulmonary hypertension ([address]) 08/22/2018    Severe aortic stenosis     Simple goiter     Skin cyst     within the armpits, right    Wears glasses      Social History     Socioeconomic History    Marital status: Single     Spouse name: Not on file    Number of children: Not on file    Years of education: Not on file    Highest education level: Not on file   Occupational History    Occupation: retired   Tobacco Use    Smoking status: Never    Smokeless tobacco: Never   Vaping Use    Vaping Use: Never used   Substance and Sexual Activity    Alcohol use: No    Drug use: No    Sexual activity: Never   Other Topics Concern    Not on file   Social History Narrative    Denied drinking coffee    Denied exercise habits    Most recent tobacco use screenin2018      Do you currently or have you served in the [address] [place] [address] Street:   No      Were you activated, into active duty, as a member of the [employer] or as a Reservist:   No          Social Determinants of Health     Financial Resource Strain: Medium Risk (9/15/2022)    Overall Financial Resource Strain (CARDIA)     Difficulty of Paying Living Expenses: Somewhat hard   Food Insecurity: No Food Insecurity (8/15/2023)    Hunger Vital Sign     Worried About Running Out of Food in the Last Year: Never true     [address] in the Last Year: Never true   Transportation Needs: No Transportation Needs (8/15/2023)    PRAPARE - Transportation     Lack of Transportation (Medical): No     Lack of Transportation (Non-Medical): No   Physical Activity: Not on file   Stress: Not on file   Social Connections: Not on file   Intimate Partner Violence: Not on file   Housing Stability: Low Risk  (8/15/2023)    Housing Stability Vital Sign     Unable to Pay for Housing in the Last Year: No     Number of Places Lived in the Last Year: 1     Unstable Housing in the Last Year: No       Subjective         Objective    Physical Exam:   Assessment Type: Assess only  General Appearance: Sleeping  Respiratory Pattern: Normal  Chest Assessment: Chest expansion symmetrical  Bilateral Breath Sounds: Diminished    Vitals:  Blood pressure 106/59, pulse (!) 53, temperature (!) 97.2 °F (36.2 °C), temperature source Oral, resp. rate 20, height 4' 7" (1.397 m), weight 87.2 kg (192 lb 3.9 oz), SpO2 95 %, not currently breastfeeding.           Imaging and other studies:     O2 Device: ra     Plan    Respiratory Plan: Home Bronchodilator Patient pathway

## 2023-08-18 NOTE — QUICK NOTE
Notified patient meeting SIRS criteria due to mild tachypnea, leukocytosis. Of note patient afebrile, no urinary symptoms. For now due to meeting SIRS criteria we will check lactic acid, procalcitonin, blood culture x2. Due to being afebrile, leukocytosis downtrending and no clear infectious source we will hold off on antibiotics. Patient received IV fluid bolus.

## 2023-08-18 NOTE — CASE MANAGEMENT
Case Management Discharge Planning Note    Patient name Abdiaziz [facility]  Location /-85 MRN [id number]  : 1940 Date 2023       Current Admission Date: 2023  Current Admission Diagnosis:Acute kidney injury superimposed on CKD [facility])   Patient Active Problem List    Diagnosis Date Noted   • Fall 2023   • Acute kidney injury superimposed on CKD ([address]) 2023   • Leukocytosis 2023   • Orbital mass 2023   • Urinary incontinence 10/26/2022   • 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2022   • Stage 3a chronic kidney disease ([address]) 2022   • Radiculopathy, lumbar region 07/10/2021   • Depression, recurrent ([address]) 2021   • Osteopenia 10/22/2020   • Insomnia 2020   • Depression with anxiety 2019   • 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/2018   • Arthritis    • AVB (atrioventricular block)    • COPD, mild (HCC)    • Coronary artery disease involving native coronary artery of native heart without angina pectoris    • JANICE on CPAP    • Gastroesophageal reflux disease without esophagitis 2018   • Iron deficiency anemia secondary to inadequate dietary iron intake 2018   • Obesity, morbid ([address]) 2018   • Chronic diastolic (congestive) heart failure ([address]) 2018   • Reactive airway disease without complication    • JANICE (obstructive sleep apnea) 2018   • Hyperlipidemia 2017   • Primary hypertension 2017   • Acquired hypothyroidism 10/29/2017   • LVH (left ventricular hypertrophy) 2016   • Mitral annular calcification 2016   • Mitral valve stenosis 2016   • Pulmonary hypertension ([address]) 2016   • Class 3 severe obesity due to excess calories with serious comorbidity and body mass index (BMI) of 45.0 to 49.9 in adult ([address]) 2016      LOS (days): 4  Geometric Mean LOS (GMLOS) (days): 3.10  Days to GMLOS:-0.8     OBJECTIVE:  Risk of Unplanned Readmission Score: 20.31         Current admission status: Inpatient   Preferred Pharmacy:   [facility] [address] [place] [address]  Phone: [phone] Fax: [phone]    Primary Care Provider: MABEL Coulter    Primary Insurance: MEDICARE  Secondary Insurance:     DISCHARGE DETAILS:    Accepting Facility Name, [address] [place] [address] : [address] [place] [address]  Receiving Facility/Agency Phone Number: [phone]  Facility/Agency Fax Number: [phone]     Patient is being discharged to [address] [place] [address].

## 2023-08-18 NOTE — NURSING NOTE
On call overnight Kesha Rosales made aware of current vital signs,  bp 106/59, temp is now 97.2 after warm blankets. Oxygen saturation on cpap is 95%. No new orders given at this time. Will continue to monitor.

## 2023-08-18 NOTE — ASSESSMENT & PLAN NOTE
Recent Labs     08/16/23  0507 08/17/23  0450 08/18/23  0432   HGB 9.9* 9.7* 9.5*     Lab Results   Component Value Date    IRON 16 (L) 08/15/2023    FERRITIN 207 08/15/2023    TIBC 232 (L) 08/15/2023    CONCFE 7 (L) 08/15/2023       · 11.9 on 07/11/2023  · Denies s/s of bleeding; monitor for development  · Iron QOD

## 2023-08-18 NOTE — PLAN OF CARE
Problem: SAFETY ADULT  Goal: Patient will remain free of falls  Description: INTERVENTIONS:  - Educate patient/family on patient safety including physical limitations  - Instruct patient to call for assistance with activity   - Consult OT/PT to assist with strengthening/mobility   - Keep Call bell within reach  - Keep bed low and locked with side rails adjusted as appropriate  - Keep care items and personal belongings within reach  - Initiate and maintain comfort rounds  - Make Fall Risk Sign visible to staff  - Offer Toileting every 2 Hours, in advance of need  - Initiate/Maintain bedalarm  - Obtain necessary fall risk management equipment:   - Apply yellow socks and bracelet for high fall risk patients  - Consider moving patient to room near nurses station  Outcome: Progressing  Goal: Maintain or return to baseline ADL function  Description: INTERVENTIONS:  -  Assess patient's ability to carry out ADLs; assess patient's baseline for ADL function and identify physical deficits which impact ability to perform ADLs (bathing, care of mouth/teeth, toileting, grooming, dressing, etc.)  - Assess/evaluate cause of self-care deficits   - Assess range of motion  - Assess patient's mobility; develop plan if impaired  - Assess patient's need for assistive devices and provide as appropriate  - Encourage maximum independence but intervene and supervise when necessary  - Involve family in performance of ADLs  - Assess for home care needs following discharge   - Consider OT consult to assist with ADL evaluation and planning for discharge  - Provide patient education as appropriate  Outcome: Progressing     Problem: Prexisting or High Potential for Compromised Skin Integrity  Goal: Skin integrity is maintained or improved  Description: INTERVENTIONS:  - Identify patients at risk for skin breakdown  - Assess and monitor skin integrity  - Assess and monitor nutrition and hydration status  - Monitor labs   - Assess for incontinence - Turn and reposition patient  - Assist with mobility/ambulation  - Relieve pressure over bony prominences  - Avoid friction and shearing  - Provide appropriate hygiene as needed including keeping skin clean and dry  - Evaluate need for skin moisturizer/barrier cream  - Collaborate with interdisciplinary team   - Patient/family teaching  - Consider wound care consult   Outcome: Progressing     Problem: MOBILITY - ADULT  Goal: Maintain or return to baseline ADL function  Description: INTERVENTIONS:  -  Assess patient's ability to carry out ADLs; assess patient's baseline for ADL function and identify physical deficits which impact ability to perform ADLs (bathing, care of mouth/teeth, toileting, grooming, dressing, etc.)  - Assess/evaluate cause of self-care deficits   - Assess range of motion  - Assess patient's mobility; develop plan if impaired  - Assess patient's need for assistive devices and provide as appropriate  - Encourage maximum independence but intervene and supervise when necessary  - Involve family in performance of ADLs  - Assess for home care needs following discharge   - Consider OT consult to assist with ADL evaluation and planning for discharge  - Provide patient education as appropriate  Outcome: Progressing  Goal: Maintains/Returns to pre admission functional level  Description: INTERVENTIONS:  - Perform BMAT or MOVE assessment daily.   - Set and communicate daily mobility goal to care team and patient/family/caregiver. - Collaborate with rehabilitation services on mobility goals if consulted  - Perform Range of Motion 3 times a day. - Reposition patient every 2 hours.   - Dangle patient 3 times a day  - Stand patient 3 times a day  - Ambulate patient 3 times a day  - Out of bed to chair 3 times a day   - Out of bed for meals 3 times a day  - Out of bed for toileting  - Record patient progress and toleration of activity level   Outcome: Progressing

## 2023-08-18 NOTE — DISCHARGE SUMMARY
[address]  Discharge- Kwadwo Goode 1940, 79 y.o. female MRN: [id number]  Unit/Bed#: -01 Encounter: [id number]  Primary Care Provider: MABEL Tang   Date and time admitted to hospital: 8/14/2023  3:39 PM    Fall  Assessment & Plan  Multiple falls over the past week. Denies head strike or LOC, and reports that after each fall she was able to lift herself off the ground without assistance. Complaints of some left foot, left shoulder (chronic, being worked up as outpatient), and neck pain, prompting ED visit. Denies other acute symptom/complaint at this time. /65   Pulse 56   Temp (!) 96.4 °F (35.8 °C)   Resp 16   Ht 4' 7" (1.397 m)   Wt 92.1 kg (203 lb 0.7 oz)   SpO2 98%   BMI 47.19 kg/m²     · Multiple falls over the past week; reportedly mechanical 2/2 generalized weakness  · Has complaints of left foot and neck pain  · CT trauma workup negative for acute traumatic injuries   · PT/OT and case management consulted  · Recommend rehab, opting for home with home health services  · After discussion with patient's caregiver who is her neighbor, patient now agreeable to rehab  · Supportive care and PRN pain control   · Fall precautions     * Acute kidney injury superimposed on CKD [facility])  Assessment & Plan  Recent Labs     08/16/23  0507 08/17/23  0450 08/18/23  0432   CREATININE 1.69* 1.33* 1.62*   EGFR 27 36 29     Estimated Creatinine Clearance: 25.9 mL/min (A) (by C-G formula based on SCr of 1.62 mg/dL (H)).     CKD Stage 3B/4    · Creatinine 2.26; baseline around 1-1.1  · One time dose of Lasix 20 mg IV; recheck BMP 1 week  · Denies flank/abd pain       Leukocytosis  Assessment & Plan  Recent Labs     08/16/23  0507 08/17/23  0450 08/18/23  0432   WBC 13.60* 12.17* 11.14*      · Unsure of cause currently  · Denies infectious S/S  · UA showing 4-10 WBC but no bacteria  · Monitor for infectious S/S development  · CBC in am    JANICE on CPAP  Assessment & Plan  · qHS CPAP ordered     Coronary artery disease involving native coronary artery of native heart without angina pectoris  Assessment & Plan  · Denies chest pain  · Continue home daily ASA and statin    COPD, mild (HCC)  Assessment & Plan  · Does not appear to be in acute exacerbation  · Continue prehosptial inhalers     AVB (atrioventricular block)  Assessment & Plan  · 1st degree AV block noted   · EKG today suggestive of such a block vs afib w/aberrant conduction? · On physical exam patient appears to be in a regular rhytm, with some pac's noted on monitor  · Would consider discussion with cardio in AM vs outpatient f/u given patient CHADVASC 4     Iron deficiency anemia secondary to inadequate dietary iron intake  Assessment & Plan  Recent Labs     08/16/23  0507 08/17/23  0450 08/18/23  0432   HGB 9.9* 9.7* 9.5*     Lab Results   Component Value Date    IRON 16 (L) 08/15/2023    FERRITIN 207 08/15/2023    TIBC 232 (L) 08/15/2023    CONCFE 7 (L) 08/15/2023       · 11.9 on 07/11/2023  · Denies s/s of bleeding; monitor for development  · Iron QOD    Chronic diastolic (congestive) heart failure (HCC)  Assessment & Plan  Wt Readings from Last 3 Encounters:   08/18/23 92.1 kg (203 lb 0.7 oz)   08/11/23 83.9 kg (185 lb)   04/13/23 85.1 kg (187 lb 9.6 oz)     · Appears euvolemic on admission   · 06/2022 ECHO EF 60%  · Monitor for s/s of volume retention   · Resume home lasix PRN      Class 3 severe obesity due to excess calories with serious comorbidity and body mass index (BMI) of 45.0 to 49.9 in adult [facility])  Assessment & Plan  Body mass index is 47.19 kg/m².     • Recommend incorporating a more whole foods plant-predominant diet along with decreasing consumption of red meats and processed foods  • Per AHA guidelines, recommend moderate-vigorous intensity exercise for 30 minutes a day for 5 days a week or a total of 150 min/week        Primary hypertension  Assessment & Plan  Blood Pressure: 112/65    · Hold home olmesartan and lasix  · Monitor BP per unit protocol     Acquired hypothyroidism  Assessment & Plan  Lab Results   Component Value Date    [id number] 1.235 07/11/2023      · Continue home levothyroxine         Medical Problems     Resolved Problems  Date Reviewed: 8/18/2023   None       Discharging Physician / Practitioner: Cristina Burns DO  PCP: MABEL Chaudhry  Admission Date:   Admission Orders (From admission, onward)     Ordered        08/14/23 1934  INPATIENT ADMISSION  Once                      Discharge Date: 08/18/23    Consultations During Hospital Stay:  · IP CONSULT TO CASE MANAGEMENT    Procedures Performed:   · None    Significant Findings / Test Results:   CT spine cervical without contrast    Result Date: 8/14/2023  Impression: No cervical spine fracture or traumatic malalignment. Workstation performed: [id number]     XR foot 3+ views LEFT    Result Date: 8/14/2023  Impression: No acute osseous abnormality. Workstation performed: [id number]     XR hip/pelv 2-3 vws left    Result Date: 8/14/2023  Impression: Unremarkable appearance of left total hip arthroplasty. Workstation performed: [id number]     CT head without contrast    Result Date: 8/14/2023  Impression: No acute intracranial abnormality. Workstation performed: [id number]       No Chest XR results available for this patient. Results for orders placed during the hospital encounter of 06/22/22    Echo complete w/ contrast if indicated    Interpretation Summary  •  Left Ventricle: Left ventricular cavity size is normal. Wall thickness is mildly increased. There is mild concentric hypertrophy. Systolic function is normal - 60%. Wall motion is normal. Diastolic function is mildly abnormal, consistent with grade I (abnormal) relaxation. •  Right Ventricle: Right ventricular cavity size is normal. Systolic function is normal.  •  Aortic Valve: There is an Cornelius NOE 3 23 mm TAVR bioprosthetic valve.  Mean pressure gradient is 19 mm Hg.  •  Mitral Valve: There is severe annular calcification. There is moderate stenosis. •  Tricuspid Valve: There is mild regurgitation. The right ventricular systolic pressure is mildly to moderately elevated (55 mm Hg). Recent Labs     08/17/23  2311 08/17/23  2320   BLOODCX Received in Microbiology Lab. Culture in Progress. Received in Microbiology Lab. Culture in Progress. Incidental Findings:   · None other than noted above    Test Results Pending at Discharge (will require follow up): · None     Outpatient Tests Requested:  · None    Complications:  None    Reason for Admission:   Chief Complaint   Patient presents with   • Fall     Arrives ems, c/o shoulder pain - has frequent falls at home, -BT -LOC - Headstrike. Hospital Course:   Denton Holley is a 79 y.o. female patient who originally presented to the hospital on 8/14/2023 due to frequent falls. Admitted for management of ambulatory dysfunction and also noted to have ERIN on CKD. Past medical history significant for CHF, CAD, CKD, COPD, hypothyroidism, JANICE on CPAP, HTN. Patient does live alone with a dog and has no other caregivers at home. Patient's neighbor and members of her [employer] visit her intermittently throughout the week to check in on her. On further evaluation with PT and OT, patient was recommended to be discharged to facility once medically stable. Initially, patient was declining placement to rehab but ultimately after discussion with patient's friend at the request of the patient, patient was ultimately commenced to go to rehab on discharge. ERIN did improve after gentle IV fluid hydration. All questions answered for the patient. Cleared for discharge to [facility]. Inbox Message sent to patient's PCP. Please see above list of diagnoses and related plan for additional information. Condition at Discharge: stable    Discharge Day Visit / Exam:   Subjective: Offers no complaints at this time. Understanding of plan. All questions answered. Rafael Jennings for discharge. Vitals: Blood Pressure: 112/65 (08/18/23 0708)  Pulse: 56 (08/18/23 0708)  Temperature: (!) 96.4 °F (35.8 °C) (08/18/23 0708)  Temp Source: Oral (08/18/23 0254)  Respirations: 16 (08/18/23 0708)  Height: 4' 7" (139.7 cm) (08/14/23 2041)  Weight - Scale: 92.1 kg (203 lb 0.7 oz) (08/18/23 0600)  SpO2: 95 % (08/18/23 1507)  Exam:   Physical Exam  Vitals and nursing note reviewed. Constitutional:       General: She is not in acute distress. Appearance: Normal appearance. HENT:      Head: Normocephalic and atraumatic. Right Ear: External ear normal.      Left Ear: External ear normal.      Nose: Nose normal.      Mouth/Throat:      Mouth: Mucous membranes are moist.   Eyes:      Pupils: Pupils are equal, round, and reactive to light. Cardiovascular:      Rate and Rhythm: Normal rate and regular rhythm. Pulses: Normal pulses. Heart sounds: Normal heart sounds. No murmur heard. Pulmonary:      Effort: Pulmonary effort is normal. No respiratory distress. Breath sounds: Normal breath sounds. No wheezing or rales. Chest:      Chest wall: No tenderness. Abdominal:      General: Bowel sounds are normal. There is no distension. Palpations: Abdomen is soft. There is no mass. Tenderness: There is no abdominal tenderness. There is no guarding. Musculoskeletal:         General: No swelling or tenderness. Cervical back: Normal range of motion and neck supple. No rigidity or tenderness. Right lower leg: No edema. Left lower leg: No edema. Skin:     General: Skin is warm and dry. Capillary Refill: Capillary refill takes less than 2 seconds. Findings: No lesion or rash. Neurological:      General: No focal deficit present. Mental Status: She is alert. Psychiatric:         Mood and Affect: Mood normal.         Behavior: Behavior normal.         Thought Content:  Thought content normal. Judgment: Judgment normal.          Discussion with Family: Patient declined call to . Discharge instructions/Information to patient and family:   See after visit summary for information provided to patient and family. Provisions for Follow-Up Care:  See after visit summary for information related to follow-up care and any pertinent home health orders. Disposition:   [facility]: [facility]. Unable to reach physician and no message left. Planned Readmission: none      Discharge Statement:  I spent 46 minutes discharging the patient. This time was spent on the day of discharge. I had direct contact with the patient on the day of discharge. Greater than 50% of the total time was spent examining patient, answering all patient questions, arranging and discussing plan of care with patient as well as directly providing post-discharge instructions. Additional time then spent on discharge activities. Discharge Medications:  See after visit summary for reconciled discharge medications provided to patient and/or family.       **Please Note: This note may have been constructed using a voice recognition system**

## 2023-08-18 NOTE — ASSESSMENT & PLAN NOTE
Multiple falls over the past week. Denies head strike or LOC, and reports that after each fall she was able to lift herself off the ground without assistance. Complaints of some left foot, left shoulder (chronic, being worked up as outpatient), and neck pain, prompting ED visit. Denies other acute symptom/complaint at this time.     /65   Pulse 56   Temp (!) 96.4 °F (35.8 °C)   Resp 16   Ht 4' 7" (1.397 m)   Wt 92.1 kg (203 lb 0.7 oz)   SpO2 98%   BMI 47.19 kg/m²     · Multiple falls over the past week; reportedly mechanical 2/2 generalized weakness  · Has complaints of left foot and neck pain  · CT trauma workup negative for acute traumatic injuries   · PT/OT and case management consulted  · Recommend rehab, opting for home with home health services  · After discussion with patient's caregiver who is her neighbor, patient now agreeable to rehab  · Supportive care and PRN pain control   · Fall precautions

## 2023-08-18 NOTE — ASSESSMENT & PLAN NOTE
Body mass index is 47.19 kg/m².     • Recommend incorporating a more whole foods plant-predominant diet along with decreasing consumption of red meats and processed foods  • Per AHA guidelines, recommend moderate-vigorous intensity exercise for 30 minutes a day for 5 days a week or a total of 150 min/week

## 2023-08-18 NOTE — ASSESSMENT & PLAN NOTE
Wt Readings from Last 3 Encounters:   08/18/23 92.1 kg (203 lb 0.7 oz)   08/11/23 83.9 kg (185 lb)   04/13/23 85.1 kg (187 lb 9.6 oz)     · Appears euvolemic on admission   · 06/2022 ECHO EF 60%  · Monitor for s/s of volume retention   · Resume home lasix PRN

## 2023-08-18 NOTE — ASSESSMENT & PLAN NOTE
Recent Labs     08/16/23  0507 08/17/23  0450 08/18/23  0432   CREATININE 1.69* 1.33* 1.62*   EGFR 27 36 29     Estimated Creatinine Clearance: 25.9 mL/min (A) (by C-G formula based on SCr of 1.62 mg/dL (H)).     CKD Stage 3B/4    · Creatinine 2.26; baseline around 1-1.1  · One time dose of Lasix 20 mg IV; recheck BMP 1 week  · Denies flank/abd pain

## 2023-08-18 NOTE — ASSESSMENT & PLAN NOTE
Lab Results   Component Value Date    [id number] 1.235 07/11/2023      · Continue home levothyroxine

## 2023-08-20 LAB
BACTERIA BLD CULT: NORMAL
BACTERIA BLD CULT: NORMAL

## 2023-08-21 ENCOUNTER — PATIENT OUTREACH (OUTPATIENT)
Dept: CASE MANAGEMENT | Facility: OTHER | Age: 83
End: 2023-08-21

## 2023-08-21 NOTE — PROGRESS NOTES
Outpatient Care Management ERIN/SNF Pathway. Discharged 8/18/23 to [facility]. Email sent to facility to inform them the patient is on the ERIN Pathway and I will be following them during their skilled stay. This Admin Coordinator will continue to monitor via chart review.

## 2023-08-23 LAB
BACTERIA BLD CULT: NORMAL
BACTERIA BLD CULT: NORMAL

## 2023-08-28 ENCOUNTER — PATIENT OUTREACH (OUTPATIENT)
Dept: CASE MANAGEMENT | Facility: OTHER | Age: 83
End: 2023-08-28

## 2023-08-28 NOTE — PROGRESS NOTES
Chart review completed. Email sent to facility requesting update on patient. This Admin Coordinator will continue to monitor via chart review throughout episode. Update obtained the patient has a PDD of 9/15/23 to Home.

## 2023-09-05 ENCOUNTER — PATIENT OUTREACH (OUTPATIENT)
Dept: CASE MANAGEMENT | Facility: OTHER | Age: 83
End: 2023-09-05

## 2023-09-05 ENCOUNTER — TELEPHONE (OUTPATIENT)
Dept: FAMILY MEDICINE CLINIC | Facility: CLINIC | Age: 83
End: 2023-09-05

## 2023-09-05 DIAGNOSIS — J44.9 COPD, MILD (HCC): Primary | ICD-10-CM

## 2023-09-05 NOTE — TELEPHONE ENCOUNTER
Randi from [address] called in regards to patient being discharged from [address] and she would like to confirm Zoloft not in her medication and ciprolfloxiacin ordered to take 500 MG per hour tablet patient has 18 tablets left for this medication, should she continue until completed or should she stop tomorrow or complete the entire course? Senno- no perscription for it and would like an script for this medication. Jacobnicole Tom, she doesn't have this listed on her discharged paperwork  Ferrous Fulsate- She should be taking 300 MG every day need perscrption to be refilled. Colace, she is ordered to take this medication and need a prescription for this,  Miralax. Needs perscription for this medication. Please review.   Thanks

## 2023-09-05 NOTE — PROGRESS NOTES
Chart review completed. Email sent to facility requesting update on patient. TDD of 9/15/23 to Home. This Admin Coordinator will continue to monitor via chart review throughout episode. Update received the patient discharged 9/1/23 to Home. Email sent to facility to inform them the patient is on the ERIN Pathway and I will be following them during their skilled stay. I have removed myself off of the care team, added the CM to the care team who will follow the patient through the episode, sent the care manager an inbasket notifying them of the ERIN/SNF Pathway. Ambulatory referral placed for complex care management.

## 2023-09-05 NOTE — TELEPHONE ENCOUNTER
Yes, I will schedule her. Latasha Cade, is a 20 minute slot sufficient for patient? You are pretty well booked until mid-October. Please advise, thank you!

## 2023-09-06 ENCOUNTER — PATIENT OUTREACH (OUTPATIENT)
Dept: CASE MANAGEMENT | Facility: OTHER | Age: 83
End: 2023-09-06

## 2023-09-06 NOTE — PROGRESS NOTES
In basket message received with patient discharged on SNF/ERIN pathway. Chart reviewed. Patient was admitted on 8/14 with ERIN on CKD. She has a history of JANICE,CHF,COPD, CAD, and a fall  She discharged to [facility] on 8/18. She discharged home on 9/1. I called and there was no answer or voicemail.

## 2023-09-07 ENCOUNTER — TRANSITIONAL CARE MANAGEMENT (OUTPATIENT)
Dept: FAMILY MEDICINE CLINIC | Facility: CLINIC | Age: 83
End: 2023-09-07

## 2023-09-07 ENCOUNTER — PATIENT OUTREACH (OUTPATIENT)
Dept: CASE MANAGEMENT | Facility: OTHER | Age: 83
End: 2023-09-07

## 2023-09-07 DIAGNOSIS — N17.9 AKI (ACUTE KIDNEY INJURY) (HCC): Primary | ICD-10-CM

## 2023-09-07 NOTE — PROGRESS NOTES
Outpatient Care Manager Note: Patient identified for SNF/ERIN Pathway. BMP order placed and PCP notified. Chart review completed. ERIN pathway interventions reviewed  including:  • PCP visit within 1 week  • Avoiding NSAIDs  • Adequate nutrition and hydration  • Keeping BP >130 if normal BP not lower  • Checking temperature  • Assessing for weight gain or loss and edema changes since home. Medication Review  • BMP   I spoke with Viktoria Niño who was discharged home with home health care. She is unsure of the company but did give me a nimber to call. I went over the instructions above. I reviewed her appointment with her. She lives alone but has support from a neighbor and her 's wife. They drive her to appointments. She is independent with ADL's and most IADL's. We reviewed the red flags of heart failure. Viktoria Niño reports the visiting nurse had educated her on daily weights. She took my contact information and repeated it to me. She did consent to another call.

## 2023-09-11 ENCOUNTER — OFFICE VISIT (OUTPATIENT)
Dept: FAMILY MEDICINE CLINIC | Facility: CLINIC | Age: 83
End: 2023-09-11
Payer: MEDICARE

## 2023-09-11 VITALS
WEIGHT: 188 LBS | HEART RATE: 62 BPM | DIASTOLIC BLOOD PRESSURE: 60 MMHG | BODY MASS INDEX: 43.51 KG/M2 | HEIGHT: 55 IN | OXYGEN SATURATION: 89 % | SYSTOLIC BLOOD PRESSURE: 100 MMHG

## 2023-09-11 DIAGNOSIS — D72.829 LEUKOCYTOSIS, UNSPECIFIED TYPE: ICD-10-CM

## 2023-09-11 DIAGNOSIS — N17.9 ACUTE KIDNEY INJURY SUPERIMPOSED ON CKD: ICD-10-CM

## 2023-09-11 DIAGNOSIS — Z76.89 ENCOUNTER FOR SUPPORT AND COORDINATION OF TRANSITION OF CARE: ICD-10-CM
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
## 2023-09-11 DIAGNOSIS — M19.012 PRIMARY OSTEOARTHRITIS OF LEFT SHOULDER: ICD-10-CM

## 2023-09-11 DIAGNOSIS — D50.8 IRON DEFICIENCY ANEMIA SECONDARY TO INADEQUATE DIETARY IRON INTAKE: ICD-10-CM

## 2023-09-11 DIAGNOSIS — N18.9 ACUTE KIDNEY INJURY SUPERIMPOSED ON CKD: ICD-10-CM

## 2023-09-11 DIAGNOSIS — F33.9 DEPRESSION, RECURRENT (HCC): ICD-10-CM

## 2023-09-11 DIAGNOSIS — R30.0 DYSURIA: Primary | ICD-10-CM

## 2023-09-11 PROBLEM — R32 URINARY INCONTINENCE: Status: RESOLVED | Noted: 2022-10-26 | Resolved: 2023-09-11

## 2023-09-11 LAB
SL AMB  POCT GLUCOSE, UA: NORMAL
SL AMB LEUKOCYTE ESTERASE,UA: NORMAL
SL AMB POCT BILIRUBIN,UA: NORMAL
SL AMB POCT BLOOD,UA: NORMAL
SL AMB POCT CLARITY,UA: NORMAL
SL AMB POCT COLOR,UA: YELLOW
SL AMB POCT KETONES,UA: NORMAL
SL AMB POCT NITRITE,UA: NORMAL
SL AMB POCT PH,UA: 6
SL AMB POCT SPECIFIC GRAVITY,UA: 1.01
SL AMB POCT URINE PROTEIN: NORMAL
SL AMB POCT UROBILINOGEN: NORMAL

## 2023-09-11 PROCEDURE — 87086 URINE CULTURE/COLONY COUNT: CPT | Performed by: NURSE PRACTITIONER

## 2023-09-11 PROCEDURE — 81002 URINALYSIS NONAUTO W/O SCOPE: CPT | Performed by: NURSE PRACTITIONER

## 2023-09-11 PROCEDURE — 99495 TRANSJ CARE MGMT MOD F2F 14D: CPT | Performed by: NURSE PRACTITIONER

## 2023-09-11 RX ORDER — CIPROFLOXACIN 500 MG/1
TABLET, FILM COATED ORAL
COMMUNITY
Start: 2023-09-01 | End: 2023-09-11

## 2023-09-11 NOTE — ASSESSMENT & PLAN NOTE
Lab Results   Component Value Date    EGFR 29 08/18/2023    EGFR 36 08/17/2023    EGFR 27 08/16/2023    CREATININE 1.62 (H) 08/18/2023    CREATININE 1.33 (H) 08/17/2023    CREATININE 1.69 (H) 08/16/2023   Advised proper hydration. Will obtain current CMP. Advised to avoid Lasix and NSAIDs.

## 2023-09-11 NOTE — PROGRESS NOTES
Assessment/Plan:     Chronic Problems:  Iron deficiency anemia secondary to inadequate dietary iron intake  We will obtain current iron panel and CBC. Depression, recurrent ([address])  Doing okay with sertraline daily. Acute kidney injury superimposed on CKD [facility])  Lab Results   Component Value Date    EGFR 29 08/18/2023    EGFR 36 08/17/2023    EGFR 27 08/16/2023    CREATININE 1.62 (H) 08/18/2023    CREATININE 1.33 (H) 08/17/2023    CREATININE 1.69 (H) 08/16/2023   Advised proper hydration. Will obtain current CMP. Advised to avoid Lasix and NSAIDs. Primary osteoarthritis of left shoulder  Recommend physical therapy for patient. Visit Diagnosis:  Diagnoses and all orders for this visit:    Dysuria  -     POCT urine dip  -     Urine culture    Acute kidney injury superimposed on CKD (HCC)  -     CBC and differential; Future  -     Comprehensive metabolic panel; Future    Iron deficiency anemia secondary to inadequate dietary iron intake  -     CBC and differential; Future  -     Iron Panel (Includes Ferritin, Iron Sat%, Iron, and TIBC); Future    Encounter for support and coordination of transition of care    Leukocytosis, unspecified type    Fall, initial encounter    Depression, recurrent ([address])    Primary osteoarthritis of left shoulder    Other orders  -     Discontinue: ciprofloxacin (CIPRO) 500 mg tablet; TAKE 1 TABLET BY MOUTH EVERY 12 HOURS FOR UTI INFECTION        Subjective:     Patient ID: Vitaly Jackman is a 79 y.o. female. Patient presents with caregiver for transition of care management. Patient was admitted to [facility] from August 14 to 18 for a fall at home. She then went to a nursing home for 2 weeks. She is really not sure which meds she is on. She continues with severe left shoulder pain. She states her ankle pain is much better. Her caregiver states that she is sleeping all the time and is very fatigued. HH RN went last week. She fell again last week.       Active Problems    Patient Active Problem List   Diagnosis   • Acquired hypothyroidism   • Primary hypertension   • Hyperlipidemia   • Reactive airway disease without complication   • JANICE (obstructive sleep apnea)   • LVH (left ventricular hypertrophy)   • Mitral annular calcification   • Mitral valve stenosis   • Class 3 severe obesity due to excess calories with serious comorbidity and body mass index (BMI) of 45.0 to 49.9 in adult [facility])   • Pulmonary hypertension (HCC)   • Chronic diastolic (congestive) heart failure (HCC)   • Iron deficiency anemia secondary to inadequate dietary iron intake   • Obesity, morbid (HCC)   • Gastroesophageal reflux disease without esophagitis   • Primary osteoarthritis of left shoulder   • AVB (atrioventricular block)   • COPD, mild (HCC)   • Coronary artery disease involving native coronary artery of native heart without angina pectoris   • JANICE on CPAP   • S/P TAVR (transcatheter aortic valve replacement)   • Depression with anxiety   • Insomnia   • Osteopenia   • Depression, recurrent (HCC)   • Radiculopathy, lumbar region   • 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• Stage 3a chronic kidney disease ([address])   • Orbital mass   • Fall   • Acute kidney injury superimposed on CKD ([address])   • Leukocytosis       Past Medical History     Past Medical History:   Diagnosis Date   • Anemia 08/22/2018   • Anxiety    • Arthritis    • AVB (atrioventricular block)     first degree   • Cataract    • CHF (congestive heart failure) (HCC)    • COPD, mild (HCC)    • Coronary artery disease    • Dislocation of right shoulder joint    • Frequent UTI    • GERD (gastroesophageal reflux disease)    • H/O: pneumonia    • Heme positive stool    • Hyperlipidemia    • Hypertension    • Hypothyroidism    • Morbid obesity with BMI of 50.0-59.9, adult ([address])    • Obesity, morbid ([address]) 08/22/2018   • JANICE on CPAP    • Pulmonary hypertension ([address]) 08/22/2018   • Severe aortic stenosis    • Simple goiter    • Skin cyst     within the armpits, right   • Wears glasses        Surgical History    Past Surgical History:   Procedure Laterality Date   • BREAST BIOPSY     • CARDIAC CATHETERIZATION     • CARPAL TUNNEL RELEASE Bilateral    • CHOLECYSTECTOMY     • DILATION AND CURETTAGE OF UTERUS     • HYSTEROSCOPY     • MASTOID SURGERY     • SC COLONOSCOPY FLX DX W/COLLJ SPEC WHEN PFRMD N/A 9/6/2018    Procedure: COLONOSCOPY;  Surgeon: Jody Ch MD;  Location: [facility]; Service: Gastroenterology   • SC ECHO TRANSESOPHAG R-T 2D W/PRB IMG ACQUISJ I&R N/A 10/9/2018    Procedure: INTRA-OP TRANSESOPHAGEAL ECHOCARDIOGRAM (GARRISON); Surgeon: Shannon Brown DO;  Location: [facility];  Service: Cardiac Surgery   • SC ESOPHAGOGASTRODUODENOSCOPY TRANSORAL DIAGNOSTIC N/A 8/31/2018    Procedure: ESOPHAGOGASTRODUODENOSCOPY (EGD); Surgeon: Jody Ch MD;  Location: [facility]; Service: Gastroenterology   • SC REPLACE AORTIC VALVE OPENFEMORAL ARTERY APPROACH N/A 10/9/2018    Procedure: REPLACEMENT AORTIC VALVE TRANSCATHETER (TAVR) TRANSFEMORAL W/ 23 MM MENDOZA NOE S3 VALVE (ACCESS OF LEFT);   Surgeon: Shannon Brown DO;  Location: [facility];  Service: Cardiac Surgery   • TOTAL HIP ARTHROPLASTY Left 2007   • TOTAL KNEE ARTHROPLASTY Bilateral        Current Meds       Current Outpatient Medications:   •  acetaminophen (TYLENOL) 500 mg tablet, Take 500 mg by mouth every 6 (six) hours as needed, Disp: , Rfl:   •  albuterol (2.5 mg/3 mL) 0.083 % nebulizer solution, Take 3 mL (2.5 mg total) by nebulization every 6 (six) hours as needed for wheezing or shortness of breath (Patient taking differently: Take 2.5 mg by nebulization every 6 (six) hours as needed for wheezing or shortness of breath PRN), Disp: 360 mL, Rfl: 5  •  albuterol (PROVENTIL HFA,VENTOLIN HFA) 90 mcg/act inhaler, Inhale 2 puffs every 6 (six) hours as needed for wheezing, Disp: 1 Inhaler, Rfl: 3  •  aspirin (ECOTRIN LOW STRENGTH) 81 mg EC tablet, Take 1 tablet (81 mg total) by mouth daily, Disp: 100 tablet, Rfl: 0  •  b complex vitamins capsule, Take 1 capsule by mouth 2 (two) times a day  , Disp: , Rfl:   •  budesonide-formoterol (Symbicort) 160-4.5 mcg/act inhaler, Inhale 2 puffs 2 (two) times a day, Disp: 10.2 g, Rfl: 3  •  Calcium Carb-Cholecalciferol (CALCIUM 600 + D PO), Take 1 tablet by mouth 2 (two) times a day, Disp: , Rfl:   •  Cranberry 1000 MG CAPS, Take by mouth, Disp: , Rfl:   •  Cranberry 250 MG TABS, Take by mouth, Disp: , Rfl:   •  Fesoterodine Fumarate ER (Toviaz) 8 MG TB24, Take 8 mg by mouth daily  (Patient not taking: Reported on 9/13/2023), Disp: , Rfl:   •  furosemide (LASIX) 20 mg tablet, Take 1 tablet (20 mg total) by mouth daily as needed (wt gain), Disp: 30 tablet, Rfl: 0  •  levothyroxine 50 mcg tablet, TAKE 1 TABLET BY MOUTH EVERY DAY, Disp: 90 tablet, Rfl: 1  •  lidocaine (LIDODERM) 5 %, Apply 1 patch topically over 12 hours daily Remove & Discard patch within 12 hours or as directed by MD Do not start before August 19, 2023., Disp: 15 patch, Rfl: 0  •  Myrbetriq 50 MG TB24, Take 1 tablet by mouth daily (Patient not taking: Reported on 9/13/2023), Disp: , Rfl:   •  olmesartan (BENICAR) 5 mg tablet, TAKE 2 TABLETS BY MOUTH EVERY DAY, Disp: 180 tablet, Rfl: 3  •  omeprazole (PriLOSEC) 40 MG capsule, TAKE 1 CAPSULE BY MOUTH TWICE A DAY, Disp: 180 capsule, Rfl: 1  •  sertraline (ZOLOFT) 100 mg tablet, TAKE 1 TABLET BY MOUTH EVERY DAY, Disp: 90 tablet, Rfl: 0  •  simvastatin (ZOCOR) 40 mg tablet, TAKE 1 TABLET BY MOUTH EVERYDAY AT BEDTIME, Disp: 90 tablet, Rfl: 1    Allergies    Allergies   Allergen Reactions   • Latex Rash   • Neosporin [Neomycin-Bacitracin Zn-Polymyx] Rash and Other (See Comments)     hives per [facility] order       No images are attached to the encounter.     Health Management    Health Maintenance   Topic Date Due   • COVID-19 Vaccine (3 - Pfizer series) 06/02/2021   • Urinary Incontinence Screening  09/15/2023   • BMI: Followup Plan  09/15/2023   • Influenza Vaccine (1) 09/01/2023   • Medicare Annual Wellness Visit (AWV)  09/15/2023   • Fall Risk  09/27/2023   • Depression Remission PHQ  03/11/2024   • BMI: Adult  09/13/2024   • Osteoporosis Screening  Completed   • Pneumococcal Vaccine: 65+ Years  Completed   • HIB Vaccine  Aged Out   • IPV Vaccine  Aged Out   • Hepatitis A Vaccine  Aged Out   • Meningococcal ACWY Vaccine  Aged Out   • HPV Vaccine  Aged Out       CBC:   Results from last 6 Months   Lab Units 08/18/23  0432 08/17/23  0450   WBC Thousand/uL 11.14* 12.17*   RBC Million/uL 3.42* 3.44*   HEMOGLOBIN g/dL 9.5* 9.7*   HEMATOCRIT % 31.1* 31.2*   MCV fL 91 91   MCH pg 27.8 28.2   MCHC g/dL 30.5* 31.1*   RDW % 14.7 14.6   MPV fL 9.6 9.6   PLATELETS Thousands/uL 358 352   NRBC AUTO /100 WBCs  --  0   NEUTROS PCT %  --  77*   LYMPHS PCT %  --  9*   MONOS PCT %  --  9   EOS PCT %  --  4   BASOS PCT %  --  0   NEUTROS ABS Thousands/µL  --  9.39*   LYMPHS ABS Thousands/µL  --  1.06   MONOS ABS Thousand/µL  --  1.11   EOS ABS Thousand/µL  --  0.47     Chemistry Profile:   Results from last 6 Months   Lab Units 08/18/23  0432 08/17/23  0450 08/14/23  1612 07/11/23  0908   POTASSIUM mmol/L 4.2 4.4   < > 4.8   CHLORIDE mmol/L 109* 108   < > 107   CO2 mmol/L 23 24   < > 26   BUN mg/dL 56* 62*   < > 29*   CREATININE mg/dL 1.62* 1.33*   < > 1.13   GLUCOSE FASTING mg/dL  --   --   --  106*   CALCIUM mg/dL 9.8 9.9   < > 10.7*   MAGNESIUM mg/dL  --  2.0   < >  --    AST U/L  --  39   < > 20   ALT U/L  --  51   < > 18   ALK PHOS U/L  --  212*   < > 73   EGFR ml/min/1.73sq m 29 36   < > 45    < > = values in this interval not displayed.      Coagulation Studies:   Results from last 6 Months   Lab Units 06/26/23  2225 06/26/23  1338   PROTIME seconds 13.6 13.5   INR  1.02 1.05   PTT seconds  --  30     Endocrine Studies:   Results from last 6 Months   Lab Units 08/18/23  0432 07/11/23  0908   HEMOGLOBIN A1C % 6.1* 5.7*   TSH 3RD GENERATON uIU/mL  --  1.235       Imaging: CT spine cervical without contrast    Result Date: 8/14/2023  Narrative: CT CERVICAL SPINE - WITHOUT CONTRAST INDICATION:   Neck trauma (Age >= 65y) fall. COMPARISON: April 20, 2019 TECHNIQUE:  CT examination of the cervical spine was performed without intravenous contrast.  Contiguous axial images were obtained. Multiplanar 2D reformatted images were created from the source data. Radiation dose length product (DLP) for this visit:  414 mGy-cm . This examination, like all CT scans performed in the [facility], was performed utilizing techniques to minimize radiation dose exposure, including the use of iterative reconstruction and automated exposure control. IMAGE QUALITY:  Diagnostic. FINDINGS: ALIGNMENT: Anterolisthesis of C3 on C4 is seen. VERTEBRAE:  No fracture. DEGENERATIVE CHANGES: Severe multilevel cervical degenerative changes are noted. No critical central canal stenosis. PREVERTEBRAL AND PARASPINAL SOFT TISSUES: Unremarkable THORACIC INLET:  Normal.     Impression: No cervical spine fracture or traumatic malalignment. Workstation performed: [id number]     XR foot 3+ views LEFT    Result Date: 8/14/2023  Narrative: LEFT FOOT INDICATION:   fall pain. COMPARISON:  None VIEWS:  XR FOOT 3+ VW LEFT FINDINGS: There is no acute fracture or dislocation. Mild degenerative changes of the 1st MTP joint. Plantar calcaneal spur No lytic or blastic osseous lesion. There are atherosclerotic calcifications. Soft tissues are otherwise unremarkable. Impression: No acute osseous abnormality. Workstation performed: [id number]     XR hip/pelv 2-3 vws left    Result Date: 8/14/2023  Narrative: LEFT HIP INDICATION:   fall pain. COMPARISON: Left hip radiographs 6/11/2021. VIEWS:  XR HIP/PELV 2-3 VWS LEFT  W PELVIS IF PERFORMED FINDINGS: There is no acute fracture or dislocation. Left total hip arthroplasty is identified in satisfactory position without evidence of hardware complication.  No lytic or blastic osseous lesion. There are atherosclerotic calcifications. Soft tissues are otherwise unremarkable. The visualized lumbar spine is unremarkable. Impression: Unremarkable appearance of left total hip arthroplasty. Workstation performed: [id number]     CT head without contrast    Result Date: 8/14/2023  Narrative: CT BRAIN - WITHOUT CONTRAST INDICATION:   Head trauma, minor (Age >= 65y) fall. COMPARISON: September 10, 2021 TECHNIQUE:  CT examination of the brain was performed. Multiplanar 2D reformatted images were created from the source data. Radiation dose length product (DLP) for this visit:  801 mGy-cm . This examination, like all CT scans performed in the [facility], was performed utilizing techniques to minimize radiation dose exposure, including the use of iterative reconstruction and automated exposure control. IMAGE QUALITY:  Diagnostic. FINDINGS: PARENCHYMA: Decreased attenuation is noted in periventricular and subcortical white matter demonstrating an appearance that is statistically most likely to represent mild microangiopathic change; this appearance is similar when compared to most recent prior examination. No CT signs of acute infarction. No intracranial mass, mass effect or midline shift. No acute parenchymal hemorrhage. VENTRICLES AND EXTRA-AXIAL SPACES:  Normal for the patient's age. VISUALIZED ORBITS: Normal visualized orbits. PARANASAL SINUSES: Status post left mastoid resection CALVARIUM AND EXTRACRANIAL SOFT TISSUES:  Normal.     Impression: No acute intracranial abnormality. Workstation performed: [id number]         Review of Systems   Constitutional: Positive for fatigue. Negative for chills, diaphoresis and fever. Cardiovascular: Negative for chest pain and palpitations. Gastrointestinal: Negative for abdominal pain, constipation, diarrhea, nausea and vomiting. Genitourinary: Negative. Musculoskeletal: Positive for arthralgias and gait problem.    Neurological: Negative for dizziness, light-headedness and headaches. Objective:     Physical Exam  Constitutional:       Appearance: She is well-developed. She is obese. Cardiovascular:      Rate and Rhythm: Normal rate and regular rhythm. Heart sounds: Normal heart sounds. No murmur heard. Pulmonary:      Effort: Pulmonary effort is normal. No respiratory distress. Breath sounds: Normal breath sounds. Musculoskeletal:      Left shoulder: Effusion present. Decreased range of motion. Decreased strength. Skin:     General: Skin is warm and dry. Neurological:      Mental Status: She is alert and oriented to person, place, and time. /60   Pulse 62   Ht 4' 7" (1.397 m)   Wt 85.3 kg (188 lb)   SpO2 (!) 89%   BMI 43.70 kg/m²     Vitals:    09/11/23 1533   BP: 100/60   Pulse: 62   SpO2: (!) 89%   Weight: 85.3 kg (188 lb)   Height: 4' 7" (1.397 m)       Transitional Care Management Review:  Judson Sahu is a 79 y.o. female here for TCM follow up. During the TCM phone call patient stated:    TCM Call     Date and time call was made  9/7/2023 10:26 AM    Patient was hospitialized at  [facility] (comment)    [address] [place] [address]    Date of Admission  08/14/23    Date of discharge  09/06/23    Diagnosis  Orbital mass    Disposition  Home    Were the patients medications reviewed and updated  No      TCM Call     Scheduled for follow up?   Yes    I have advised the patient to call PCP with any new or worsening symptoms  Booker Franz, REVA                      [employer], [place]

## 2023-09-12 LAB — BACTERIA UR CULT: NORMAL

## 2023-09-12 NOTE — PROGRESS NOTES
Assessment:  1. Primary osteoarthritis of left shoulder    2. Post-traumatic osteoarthritis of right shoulder        Plan:  Orders Placed This Encounter   Procedures   • FL spine and pain procedure     Standing Status:   Future     Standing Expiration Date:   9/13/2027     Scheduling Instructions:      Schedule first     Order Specific Question:   Reason for Exam:     Answer:   left shoulder injection USGI     Order Specific Question:   Anticoagulant hold needed? Answer:   no   • FL spine and pain procedure     Standing Status:   Future     Standing Expiration Date:   9/13/2027     Scheduling Instructions:      Schedule 2nd     Order Specific Question:   Reason for Exam:     Answer:   right shoulder injection USGI     Order Specific Question:   Anticoagulant hold needed? Answer:   no       No orders of the defined types were placed in this encounter. My impressions and treatment recommendations were discussed in detail with the patient, who verbalized understanding and had no further questions. This is an 68-year-old female returns her office with bilateral shoulder pain secondary to osteoarthritis. She has had mostly injections for the right shoulder, however she is having bilateral shoulder pain now. Appears left is worse than the right at this time. We discussed bilateral shoulder injection spaced out in order to maximize usage of Depo-Medrol. Will do under USGI so patient can sit for the procedure. She was given oxycodone 5 mg tablet by her PCP. Reports that this makes her drowsy but does help with the pain. Advised to try taking half a tablet twice a day as needed. We will try to schedule her for injections as possible. [place] Prescription Drug Monitoring Program report was reviewed and was appropriate     Complete risks and benefits including bleeding, infection, tissue reaction, nerve injury and allergic reaction were discussed.  The approach was demonstrated using models and literature was provided. Verbal and written consent was obtained. Discharge instructions were provided. I personally saw and examined the patient and I agree with the above discussed plan of care. History of Present Illness:    Odell Kuhn is a 79 y.o. female who presents to [facility] for reevaluation of the above stated pain complaints. She is here with bilateral shoulder pain that is worse at night and is constant, throbbing, shooting in nature. She last underwent right intra-articular shoulder injection December 2022. Review of Systems:    Review of Systems   Respiratory: Positive for shortness of breath. Musculoskeletal: Positive for myalgias. DROM  Joint stiffness           Past Medical History:   Diagnosis Date   • Anemia 08/22/2018   • Anxiety    • Arthritis    • AVB (atrioventricular block)     first degree   • Cataract    • CHF (congestive heart failure) (HCC)    • COPD, mild (HCC)    • Coronary artery disease    • Dislocation of right shoulder joint    • Frequent UTI    • GERD (gastroesophageal reflux disease)    • H/O: pneumonia    • Heme positive stool    • Hyperlipidemia    • Hypertension    • Hypothyroidism    • Morbid obesity with BMI of 50.0-59.9, adult ([address])    • Obesity, morbid ([address]) 08/22/2018   • JANICE on CPAP    • Pulmonary hypertension ([address]) 08/22/2018   • Severe aortic stenosis    • Simple goiter    • Skin cyst     within the armpits, right   • Wears glasses        Past Surgical History:   Procedure Laterality Date   • BREAST BIOPSY     • CARDIAC CATHETERIZATION     • CARPAL TUNNEL RELEASE Bilateral    • CHOLECYSTECTOMY     • DILATION AND CURETTAGE OF UTERUS     • HYSTEROSCOPY     • MASTOID SURGERY     • PA COLONOSCOPY FLX DX W/COLLJ SPEC WHEN PFRMD N/A 9/6/2018    Procedure: COLONOSCOPY;  Surgeon: Ayla Farooq MD;  Location: [facility];   Service: Gastroenterology   • PA ECHO TRANSESOPHAG R-T 2D W/PRB IMG ACQUISJ I&R N/A 10/9/2018 Procedure: INTRA-OP TRANSESOPHAGEAL ECHOCARDIOGRAM (GARRISON); Surgeon: Deepthi De La Garza DO;  Location: [facility];  Service: Cardiac Surgery   • VT ESOPHAGOGASTRODUODENOSCOPY TRANSORAL DIAGNOSTIC N/A 8/31/2018    Procedure: ESOPHAGOGASTRODUODENOSCOPY (EGD); Surgeon: Deb Horowitz MD;  Location: [facility]; Service: Gastroenterology   • VT REPLACE AORTIC VALVE OPENFEMORAL ARTERY APPROACH N/A 10/9/2018    Procedure: REPLACEMENT AORTIC VALVE TRANSCATHETER (TAVR) TRANSFEMORAL W/ 23 MM MENDOZA NOE S3 VALVE (ACCESS OF LEFT);   Surgeon: Deepthi De La Garza DO;  [facility];  Service: Cardiac Surgery   • TOTAL HIP ARTHROPLASTY Left 2007   • TOTAL KNEE ARTHROPLASTY Bilateral        Family History   Problem Relation Age of Onset   • Diabetes Mother    • Stroke Mother    • Cancer Father    • Lung cancer Father    • Diabetes Sister    • Heart disease Sister    • Hypertension Sister    • Coronary artery disease Family    • Diabetes Family    • Hypertension Family    • Cancer Family    • Stroke Family    • Thyroid disease Neg Hx        Social History     Occupational History   • Occupation: retired   Tobacco Use   • Smoking status: Never   • Smokeless tobacco: Never   Vaping Use   • Vaping Use: Never used   Substance and Sexual Activity   • Alcohol use: No   • Drug use: No   • Sexual activity: Never         Current Outpatient Medications:   •  acetaminophen (TYLENOL) 500 mg tablet, Take 500 mg by mouth every 6 (six) hours as needed, Disp: , Rfl:   •  albuterol (2.5 mg/3 mL) 0.083 % nebulizer solution, Take 3 mL (2.5 mg total) by nebulization every 6 (six) hours as needed for wheezing or shortness of breath (Patient taking differently: Take 2.5 mg by nebulization every 6 (six) hours as needed for wheezing or shortness of breath PRN), Disp: 360 mL, Rfl: 5  •  albuterol (PROVENTIL HFA,VENTOLIN HFA) 90 mcg/act inhaler, Inhale 2 puffs every 6 (six) hours as needed for wheezing, Disp: 1 Inhaler, Rfl: 3  •  aspirin (ECOTRIN LOW STRENGTH) 81 mg EC tablet, Take 1 tablet (81 mg total) by mouth daily, Disp: 100 tablet, Rfl: 0  •  b complex vitamins capsule, Take 1 capsule by mouth 2 (two) times a day  , Disp: , Rfl:   •  budesonide-formoterol (Symbicort) 160-4.5 mcg/act inhaler, Inhale 2 puffs 2 (two) times a day, Disp: 10.2 g, Rfl: 3  •  Calcium Carb-Cholecalciferol (CALCIUM 600 + D PO), Take 1 tablet by mouth 2 (two) times a day, Disp: , Rfl:   •  Cranberry 1000 MG CAPS, Take by mouth, Disp: , Rfl:   •  Cranberry 250 MG TABS, Take by mouth, Disp: , Rfl:   •  furosemide (LASIX) 20 mg tablet, Take 1 tablet (20 mg total) by mouth daily as needed (wt gain), Disp: 30 tablet, Rfl: 0  •  levothyroxine 50 mcg tablet, TAKE 1 TABLET BY MOUTH EVERY DAY, Disp: 90 tablet, Rfl: 1  •  lidocaine (LIDODERM) 5 %, Apply 1 patch topically over 12 hours daily Remove & Discard patch within 12 hours or as directed by MD Do not start before August 19, 2023., Disp: 15 patch, Rfl: 0  •  olmesartan (BENICAR) 5 mg tablet, TAKE 2 TABLETS BY MOUTH EVERY DAY, Disp: 180 tablet, Rfl: 3  •  omeprazole (PriLOSEC) 40 MG capsule, TAKE 1 CAPSULE BY MOUTH TWICE A DAY, Disp: 180 capsule, Rfl: 1  •  sertraline (ZOLOFT) 100 mg tablet, TAKE 1 TABLET BY MOUTH EVERY DAY, Disp: 90 tablet, Rfl: 0  •  simvastatin (ZOCOR) 40 mg tablet, TAKE 1 TABLET BY MOUTH EVERYDAY AT BEDTIME, Disp: 90 tablet, Rfl: 1  •  Fesoterodine Fumarate ER (Toviaz) 8 MG TB24, Take 8 mg by mouth daily  (Patient not taking: Reported on 9/13/2023), Disp: , Rfl:   •  Myrbetriq 50 MG TB24, Take 1 tablet by mouth daily (Patient not taking: Reported on 9/13/2023), Disp: , Rfl:     Allergies   Allergen Reactions   • Latex Rash   • Neosporin [Neomycin-Bacitracin Zn-Polymyx] Rash and Other (See Comments)     hives per [facility] order       Physical Exam:    /70   Pulse (!) 54   Ht 4' 7" (1.397 m)   Wt 85.3 kg (188 lb)   BMI 43.70 kg/m²     Constitutional: normal, well developed, well nourished, alert, in no distress and non-toxic and no overt pain behavior. Eyes: anicteric  HEENT: grossly intact  Neck: supple, symmetric, trachea midline and no masses   Pulmonary:even and unlabored  Cardiovascular:No edema or pitting edema present  Skin:Normal without rashes or lesions and well hydrated  Psychiatric:Mood and affect appropriate  Neurologic:Cranial Nerves II-XII grossly intact  Musculoskeletal:normal    Imaging    CT CERVICAL SPINE - WITHOUT CONTRAST  0814/2023      INDICATION:   Neck trauma (Age >= 65y)  fall.     COMPARISON: April 20, 2019     TECHNIQUE:  CT examination of the cervical spine was performed without intravenous contrast.  Contiguous axial images were obtained. Multiplanar 2D reformatted images were created from the source data.     Radiation dose length product (DLP) for this visit:  414 mGy-cm . This examination, like all CT scans performed in the [facility], was performed utilizing techniques to minimize radiation dose exposure, including the use of iterative   reconstruction and automated exposure control.     IMAGE QUALITY:  Diagnostic.     FINDINGS:     ALIGNMENT: Anterolisthesis of C3 on C4 is seen.     VERTEBRAE:  No fracture.     DEGENERATIVE CHANGES: Severe multilevel cervical degenerative changes are noted. No critical central canal stenosis.     PREVERTEBRAL AND PARASPINAL SOFT TISSUES: Unremarkable     THORACIC INLET:  Normal.     IMPRESSION:     No cervical spine fracture or traumatic malalignment. LEFT SHOULDER  08/11/2023     INDICATION:   M25.512: Pain in left shoulder.     COMPARISON: December 19, 2022, January 15, 2016  VIEWS:  XR SHOULDER 2+ VW LEFT  Images: 4     FINDINGS: Suboptimal positioning of the shoulder     There is no acute fracture or dislocation.     Glenohumeral arthritis seen.  Calcific tendinitis seen  Moderate-sized inferomedial osteophyte seen  Cystic changes seen within the humeral head  calcific density seen superior to the humeral head suspect loose body  No lytic or blastic osseous lesion.     Soft tissues are unremarkable.     IMPRESSION:     Moderate glenohumeral arthritis  Suggestion of shoulder effusion  Calcific tendinitis  No acute displaced fracture  FL spine and pain procedure    (Results Pending)   FL spine and pain procedure    (Results Pending)       Orders Placed This Encounter   Procedures   • FL spine and pain procedure   • FL spine and pain procedure

## 2023-09-13 ENCOUNTER — TELEPHONE (OUTPATIENT)
Dept: FAMILY MEDICINE CLINIC | Facility: CLINIC | Age: 83
End: 2023-09-13

## 2023-09-13 ENCOUNTER — OFFICE VISIT (OUTPATIENT)
Dept: PAIN MEDICINE | Facility: CLINIC | Age: 83
End: 2023-09-13
Payer: MEDICARE

## 2023-09-13 VITALS
DIASTOLIC BLOOD PRESSURE: 70 MMHG | WEIGHT: 188 LBS | BODY MASS INDEX: 43.51 KG/M2 | HEART RATE: 54 BPM | HEIGHT: 55 IN | SYSTOLIC BLOOD PRESSURE: 121 MMHG

## 2023-09-13 DIAGNOSIS — M19.012 PRIMARY OSTEOARTHRITIS OF LEFT SHOULDER: Primary | ICD-10-CM

## 2023-09-13 DIAGNOSIS — M19.111 POST-TRAUMATIC OSTEOARTHRITIS OF RIGHT SHOULDER: ICD-10-CM

## 2023-09-13 PROCEDURE — 99214 OFFICE O/P EST MOD 30 MIN: CPT | Performed by: STUDENT IN AN ORGANIZED HEALTH CARE EDUCATION/TRAINING PROGRAM

## 2023-09-13 RX ORDER — OXYCODONE HYDROCHLORIDE 5 MG/1
2.5 TABLET ORAL 2 TIMES DAILY PRN
Qty: 15 TABLET | Refills: 0 | Status: SHIPPED | OUTPATIENT
Start: 2023-09-13 | End: 2023-09-13

## 2023-09-13 NOTE — TELEPHONE ENCOUNTER
Mami Garcia called stating that Vanda Milligan went to pain management and they didn't give her a shot for her pain. They schedule her for next Friday. She also mentioned there are some issue with her rehab and nursing agency. They came today to do physical therapy and they gave her no date when they will be back.  She wants to speak to Latasha Cade personally to fix the mis communication that is going on

## 2023-09-14 DIAGNOSIS — G89.29 CHRONIC LEFT SHOULDER PAIN: ICD-10-CM

## 2023-09-14 DIAGNOSIS — R29.6 MULTIPLE FALLS: Primary | ICD-10-CM

## 2023-09-14 DIAGNOSIS — M25.512 CHRONIC LEFT SHOULDER PAIN: ICD-10-CM

## 2023-09-15 ENCOUNTER — HOME HEALTH ADMISSION (OUTPATIENT)
Dept: HOME HEALTH SERVICES | Facility: HOME HEALTHCARE | Age: 83
End: 2023-09-15
Payer: MEDICARE

## 2023-09-15 ENCOUNTER — PATIENT OUTREACH (OUTPATIENT)
Dept: CASE MANAGEMENT | Facility: OTHER | Age: 83
End: 2023-09-15

## 2023-09-15 ENCOUNTER — TELEPHONE (OUTPATIENT)
Dept: FAMILY MEDICINE CLINIC | Facility: CLINIC | Age: 83
End: 2023-09-15

## 2023-09-15 ENCOUNTER — TELEPHONE (OUTPATIENT)
Age: 83
End: 2023-09-15

## 2023-09-15 DIAGNOSIS — D72.829 LEUKOCYTOSIS, UNSPECIFIED TYPE: ICD-10-CM

## 2023-09-15 DIAGNOSIS — N18.9 ACUTE KIDNEY INJURY SUPERIMPOSED ON CKD: Primary | ICD-10-CM

## 2023-09-15 DIAGNOSIS — N17.9 ACUTE KIDNEY INJURY SUPERIMPOSED ON CKD: Primary | ICD-10-CM

## 2023-09-15 NOTE — TELEPHONE ENCOUNTER
Lynsey Iqbal is calling to let you know the Home nursing is discharging her today and you need to put in an order for the [address] [place] [address]. Spoke with Lynsey Iqbal, I reversed the message. She is being d/c from the [address] [place] [address] and needs the order for [facility].

## 2023-09-15 NOTE — TELEPHONE ENCOUNTER
Caller: Betty Kendrick     Doctor: Dr Aurelio Barreto     Reason for call: Patient calling stating she would like to cancel both procedure does not want to reschedule.     Call back#: [phone]

## 2023-09-15 NOTE — PROGRESS NOTES
I spoke with Viktoria Niño who reports she has right shoulder pain from osteoarthritis. She was referred to orthopedics and has an appointment on 9/18. Her 's wife will drive her. She has hydrocodone for pain. She is aware of the side effects. Her PCP put in a referral to [facility] on 9/14. She saw her PCP on 9/11 and was advised her labs are improving. She has no needs at this time. I will call her after the office visit.

## 2023-09-17 ENCOUNTER — HOME CARE VISIT (OUTPATIENT)
Dept: HOME HEALTH SERVICES | Facility: HOME HEALTHCARE | Age: 83
End: 2023-09-17

## 2023-09-18 ENCOUNTER — APPOINTMENT (OUTPATIENT)
Dept: RADIOLOGY | Facility: CLINIC | Age: 83
End: 2023-09-18
Payer: MEDICARE

## 2023-09-18 ENCOUNTER — OFFICE VISIT (OUTPATIENT)
Dept: OBGYN CLINIC | Facility: CLINIC | Age: 83
End: 2023-09-18
Payer: MEDICARE

## 2023-09-18 VITALS
OXYGEN SATURATION: 95 % | SYSTOLIC BLOOD PRESSURE: 157 MMHG | WEIGHT: 182 LBS | HEART RATE: 62 BPM | BODY MASS INDEX: 42.12 KG/M2 | HEIGHT: 55 IN | DIASTOLIC BLOOD PRESSURE: 67 MMHG

## 2023-09-18 DIAGNOSIS — M25.511 RIGHT SHOULDER PAIN, UNSPECIFIED CHRONICITY: ICD-10-CM

## 2023-09-18 DIAGNOSIS — M19.111 POST-TRAUMATIC OSTEOARTHRITIS OF RIGHT SHOULDER: Primary | ICD-10-CM

## 2023-09-18 DIAGNOSIS — M19.012 PRIMARY OSTEOARTHRITIS OF LEFT SHOULDER: ICD-10-CM

## 2023-09-18 PROCEDURE — 99214 OFFICE O/P EST MOD 30 MIN: CPT | Performed by: FAMILY MEDICINE

## 2023-09-18 PROCEDURE — 73030 X-RAY EXAM OF SHOULDER: CPT

## 2023-09-18 NOTE — TELEPHONE ENCOUNTER
Caller: Mohini Ramírez     Doctor: Dr Maged Godinez     Reason for call: Patient calling stating she would like to reschedule Left side procedure please advise     Call back#: [phone]

## 2023-09-18 NOTE — PROGRESS NOTES
Accompanied by oumou     Subjective:  Chief Complaint   Patient presents with   • Right Shoulder - Pain   • Left Shoulder - Pain       Jin Jordan is a 79 y.o. female is presenting today for initial evaluation of bilateral shoulder pain. Patient has been dealing with bilateral shoulder pain for greater than 1 year. States that the symptoms have begun approximately 2 years ago after a possible fall injury. She reports that she was seen by Ortho surgery and had x-rays completed which revealed advanced degenerative changes in the bilateral shoulders. Her prior imaging studies were reviewed independently. CT of the right shoulder without contrast from 2022 revealed advanced right shoulder arthropathy with associated subluxation. X-rays of the left shoulder reveals moderate degree of glenohumeral arthritis, calcific tendinitis and associated shoulder effusion. Patient denies any fevers or chills. Her shoulder pain symptoms have gradually worsened. She has received treatment for the right shoulder with conservative options including physical therapy and cortisone injection under ultrasound guidance. She states that the injection for the right shoulder was providing her temporary relief and she had actually scheduled with pain management for this week to discuss repeating cortisone injections. The following portions of the patient's history were reviewed and updated as appropriate: allergies, current medications, past family history, past medical history, past social history, past surgical history and problem list.    Occupation:      Review of Systems   Constitutional: Negative for fever.         Objective:  /67   Pulse 62   Ht 4' 7" (1.397 m)   Wt 82.6 kg (182 lb)   SpO2 95%   BMI 42.30 kg/m²     Skin: no rashes, lesions, skin discolorations, lacerations  Vasculature: normal radial and ulnar pulse,  normal skin color, normal capillary refill in extremity, no upper extremity edema  Neurologic: Neurologic exam is normal throughout upper extremities, Awake, alert, and oriented x3, no apparent distress. Musculoskeletal:   bilateral SHOULDER EXAM  There is no gross deformity noted on bilateral shoulder  Notable crepitus bilaterally in the anterior joint line  shoulder range of motion: Forward flexion to about 80 degrees, internal rotation to right hip, external rotation to about 60 degrees  There is no redness or warmth over the skin  There is no notable swelling appreciated  Notable weakness with supraspinatus infraspinatus and subscapularis testing  Positive tenderness to palpation over the [facility] joint      Imaging:    No results found. Assessment/Plan:  1. Post-traumatic osteoarthritis of right shoulder    - XR shoulder 2+ vw right; Future  - Ambulatory Referral to Orthopedic Surgery; Future    2. Primary osteoarthritis of left shoulder    - Ambulatory Referral to Orthopedic Surgery; Future    X-rays of the right shoulder were obtained in office today and reviewed independently and reviewed in comparison to CT shoulder report from 2022. Current right shoulder x-ray reveals similar findings of advanced generative changes, AC joint degenerative changes, and notable subluxation of the humeral head anteriorly. Finally a possible remote tuberosity fracture fragment was seen which seem to be present on prior CT imaging. Follow-up on official radiology report. Prior x-ray reports of the left shoulder were reviewed independently, advanced degenerative changes were appreciated and mild joint effusion noted likely secondary to underlying arthritis. Follow-up on official radiology report. We discussed her prior imaging studies and current pain symptoms and my clinical impression is that patient is suffering from underlying bilateral shoulder osteoarthritis.   She had seen Ortho surgery for the right shoulder in the past and was recommended for conservative treatment options with injections and PT. She was treated with injections in the past however reported that it provided her with mild symptom relief and only lasted for few weeks. We discussed having patient see second opinion for right shoulder pain and advanced arthritis-referral was placed. I discussed that I can certainly have her return for cortisone injections under ultrasound guidance for bilateral shoulders however first would like her to see Ortho surgery in regards to the right shoulder. She is scheduled for pain management left shoulder injection in a advised that it is reasonable for her to proceed with this injection as scheduled-I can certainly see her for the left shoulder as well however would need to schedule her for ultrasound procedure.   She will contact me after she sees Ortho surgery and if plan is to continue with conservative treatment

## 2023-09-19 ENCOUNTER — HOME CARE VISIT (OUTPATIENT)
Dept: HOME HEALTH SERVICES | Facility: HOME HEALTHCARE | Age: 83
End: 2023-09-19
Payer: MEDICARE

## 2023-09-19 ENCOUNTER — PATIENT OUTREACH (OUTPATIENT)
Dept: CASE MANAGEMENT | Facility: OTHER | Age: 83
End: 2023-09-19

## 2023-09-19 ENCOUNTER — TELEPHONE (OUTPATIENT)
Dept: FAMILY MEDICINE CLINIC | Facility: CLINIC | Age: 83
End: 2023-09-19

## 2023-09-19 VITALS
OXYGEN SATURATION: 98 % | HEART RATE: 68 BPM | SYSTOLIC BLOOD PRESSURE: 130 MMHG | TEMPERATURE: 98.1 F | RESPIRATION RATE: 20 BRPM | DIASTOLIC BLOOD PRESSURE: 70 MMHG

## 2023-09-19 DIAGNOSIS — R21 RASH: Primary | ICD-10-CM

## 2023-09-19 PROCEDURE — 400013 VN SOC

## 2023-09-19 PROCEDURE — G0299 HHS/HOSPICE OF RN EA 15 MIN: HCPCS

## 2023-09-19 PROCEDURE — 10330081 VN NO-PAY CLAIM PROCEDURE

## 2023-09-19 RX ORDER — NYSTATIN 100000 [USP'U]/G
POWDER TOPICAL 2 TIMES DAILY
Qty: 60 G | Refills: 1 | Status: SHIPPED | OUTPATIENT
Start: 2023-09-19 | End: 2023-10-24

## 2023-09-19 NOTE — TELEPHONE ENCOUNTER
Vicky Anne from [address] called stated patient started home health today and upon exam they found rash under patient's breasts.    Would Diya Schrader send over a script for a Nystatin powder or cream  [facility] [place]

## 2023-09-19 NOTE — TELEPHONE ENCOUNTER
Spoke to pt and she states she has been rescheduled for TPI on LT shoulder and will see a surgeon for RT shoulder and let us know if she wants TPI done on RT shoulder.

## 2023-09-19 NOTE — PROGRESS NOTES
Chart reviewed. Patient has a visit today with [facility] nurse. In basket message sent to [facility] clinical coordinator. BMP was ordered on 9/7. In basket message sent to PCP.

## 2023-09-21 ENCOUNTER — HOME CARE VISIT (OUTPATIENT)
Dept: HOME HEALTH SERVICES | Facility: HOME HEALTHCARE | Age: 83
End: 2023-09-21
Payer: MEDICARE

## 2023-09-21 VITALS
SYSTOLIC BLOOD PRESSURE: 110 MMHG | OXYGEN SATURATION: 97 % | DIASTOLIC BLOOD PRESSURE: 62 MMHG | HEART RATE: 72 BPM | RESPIRATION RATE: 18 BRPM | TEMPERATURE: 97.9 F

## 2023-09-21 PROCEDURE — G0300 HHS/HOSPICE OF LPN EA 15 MIN: HCPCS

## 2023-09-22 ENCOUNTER — PATIENT OUTREACH (OUTPATIENT)
Dept: CASE MANAGEMENT | Facility: OTHER | Age: 83
End: 2023-09-22

## 2023-09-25 ENCOUNTER — HOME CARE VISIT (OUTPATIENT)
Dept: HOME HEALTH SERVICES | Facility: HOME HEALTHCARE | Age: 83
End: 2023-09-25
Payer: MEDICARE

## 2023-09-25 ENCOUNTER — TELEPHONE (OUTPATIENT)
Dept: FAMILY MEDICINE CLINIC | Facility: CLINIC | Age: 83
End: 2023-09-25

## 2023-09-25 VITALS
OXYGEN SATURATION: 96 % | HEART RATE: 43 BPM | SYSTOLIC BLOOD PRESSURE: 130 MMHG | DIASTOLIC BLOOD PRESSURE: 80 MMHG | RESPIRATION RATE: 16 BRPM | WEIGHT: 185 LBS | TEMPERATURE: 96.7 F | BODY MASS INDEX: 43 KG/M2

## 2023-09-25 PROCEDURE — G0299 HHS/HOSPICE OF RN EA 15 MIN: HCPCS

## 2023-09-25 NOTE — TELEPHONE ENCOUNTER
Please advise on a Dr. Andrade Favorite patient? Thank you! Graft Donor Site Bandage (Optional-Leave Blank If You Don't Want In Note): Steri-strips and a pressure bandage were applied to the donor site.

## 2023-09-25 NOTE — TELEPHONE ENCOUNTER
Spoke with patient- she is asking for a refill on the Oxycodone 5mg every 4 hours. She stated she is scheduled for her shoulder injection 10/02/2023 but she is in a lot of pain. Are we able to send this in for her? Please advise, thank you!

## 2023-09-25 NOTE — CASE COMMUNICATION
Unable to schdule patient for [address] PT evaluation on Tuesday 9/26 or Wed 9/27. PT eval planned for Thursday 9/28.

## 2023-09-25 NOTE — CASE COMMUNICATION
Zheng Orozco regarding the delay in initiating PT within 7 days. Anticipated date to begin is 10/2/23 or sooner. PT to contact the patient to schedule the visit.

## 2023-09-26 ENCOUNTER — PATIENT OUTREACH (OUTPATIENT)
Dept: CASE MANAGEMENT | Facility: OTHER | Age: 83
End: 2023-09-26

## 2023-09-26 DIAGNOSIS — G89.29 CHRONIC LEFT SHOULDER PAIN: Primary | ICD-10-CM

## 2023-09-26 DIAGNOSIS — M25.512 CHRONIC LEFT SHOULDER PAIN: Primary | ICD-10-CM

## 2023-09-26 PROCEDURE — G0180 MD CERTIFICATION HHA PATIENT: HCPCS | Performed by: NURSE PRACTITIONER

## 2023-09-26 RX ORDER — METHOCARBAMOL 500 MG/1
500 TABLET, FILM COATED ORAL 3 TIMES DAILY
Qty: 30 TABLET | Refills: 0 | Status: SHIPPED | OUTPATIENT
Start: 2023-09-26 | End: 2023-10-06 | Stop reason: SDUPTHER

## 2023-09-26 NOTE — PROGRESS NOTES
I spoke with Taryn Devaughn who continues to have shoulder pain bilaterally. She has started physical therapy with [facility]. She has an appointment with pain management and orthopedics on 10/2. I advised Taryn Devaughn her PCP sent a prescription to [facility] today for Robaxin. I advised her to start it since it may help relieve her pain. I advised her to call me if I can assist her.

## 2023-09-27 NOTE — TELEPHONE ENCOUNTER
Last few narcotic rx were not from us. MG gave her tramadol in the past which was not helpful. If she wants a medication prescribed on a more consistent basis she can either ask her PCP or would have to come in to see MG to sign agreement.  She also reported drowsiness with oxycodone so I told her to try half a pill

## 2023-09-27 NOTE — TELEPHONE ENCOUNTER
S/W pt and she advised that PCP gave her methocarbamol which has been helpful  She thinks she will be ok until injections

## 2023-09-28 ENCOUNTER — HOME CARE VISIT (OUTPATIENT)
Dept: HOME HEALTH SERVICES | Facility: HOME HEALTHCARE | Age: 83
End: 2023-09-28
Payer: MEDICARE

## 2023-09-28 VITALS
OXYGEN SATURATION: 94 % | WEIGHT: 180 LBS | HEART RATE: 67 BPM | SYSTOLIC BLOOD PRESSURE: 150 MMHG | TEMPERATURE: 96.8 F | DIASTOLIC BLOOD PRESSURE: 68 MMHG | BODY MASS INDEX: 41.84 KG/M2 | RESPIRATION RATE: 18 BRPM

## 2023-09-28 VITALS — SYSTOLIC BLOOD PRESSURE: 132 MMHG | HEART RATE: 72 BPM | DIASTOLIC BLOOD PRESSURE: 64 MMHG | OXYGEN SATURATION: 95 %

## 2023-09-28 PROCEDURE — G0151 HHCP-SERV OF PT,EA 15 MIN: HCPCS

## 2023-09-28 PROCEDURE — G0299 HHS/HOSPICE OF RN EA 15 MIN: HCPCS

## 2023-09-28 NOTE — CASE COMMUNICATION
Patient seen for [address] PT evaluation for management of B shoulder OA/pain. She has significant ROM restrictions and painful via MMT inhibiting strength. Postural influences on pain and ROM present with marked forward shoulders and much difficulty correcting. Patient also with history of falls and instability with mobility. Demo SBA for gait and transfers today. Additional LE strengthening warranted with overall plan to establish HEP fo r posture, UE ROM and pain management and LE strength/ROM. Plan 1 x 1 week then 2 x 2 weeks.

## 2023-10-02 ENCOUNTER — CONSULT (OUTPATIENT)
Dept: OBGYN CLINIC | Facility: CLINIC | Age: 83
End: 2023-10-02
Payer: MEDICARE

## 2023-10-02 ENCOUNTER — PROCEDURE VISIT (OUTPATIENT)
Dept: PAIN MEDICINE | Facility: CLINIC | Age: 83
End: 2023-10-02
Payer: MEDICARE

## 2023-10-02 VITALS
WEIGHT: 186.2 LBS | SYSTOLIC BLOOD PRESSURE: 122 MMHG | HEIGHT: 55 IN | BODY MASS INDEX: 43.09 KG/M2 | DIASTOLIC BLOOD PRESSURE: 72 MMHG | HEART RATE: 62 BPM

## 2023-10-02 VITALS
DIASTOLIC BLOOD PRESSURE: 66 MMHG | BODY MASS INDEX: 43.18 KG/M2 | HEIGHT: 55 IN | SYSTOLIC BLOOD PRESSURE: 137 MMHG | HEART RATE: 41 BPM | WEIGHT: 186.6 LBS

## 2023-10-02 DIAGNOSIS — M19.012 PRIMARY OSTEOARTHRITIS OF LEFT SHOULDER: ICD-10-CM

## 2023-10-02 DIAGNOSIS — M19.012 PRIMARY OSTEOARTHRITIS OF LEFT SHOULDER: Primary | ICD-10-CM

## 2023-10-02 DIAGNOSIS — M19.111 POST-TRAUMATIC OSTEOARTHRITIS OF RIGHT SHOULDER: ICD-10-CM

## 2023-10-02 DIAGNOSIS — M24.411 CHRONIC DISLOCATION OF RIGHT SHOULDER: Primary | ICD-10-CM

## 2023-10-02 PROCEDURE — 20611 DRAIN/INJ JOINT/BURSA W/US: CPT | Performed by: STUDENT IN AN ORGANIZED HEALTH CARE EDUCATION/TRAINING PROGRAM

## 2023-10-02 PROCEDURE — 99214 OFFICE O/P EST MOD 30 MIN: CPT | Performed by: ORTHOPAEDIC SURGERY

## 2023-10-02 RX ORDER — METHYLPREDNISOLONE ACETATE 40 MG/ML
80 INJECTION, SUSPENSION INTRA-ARTICULAR; INTRALESIONAL; INTRAMUSCULAR; SOFT TISSUE ONCE
Status: COMPLETED | OUTPATIENT
Start: 2023-10-02 | End: 2023-10-02

## 2023-10-02 RX ORDER — BUPIVACAINE HYDROCHLORIDE 2.5 MG/ML
3 INJECTION, SOLUTION EPIDURAL; INFILTRATION; INTRACAUDAL ONCE
Status: COMPLETED | OUTPATIENT
Start: 2023-10-02 | End: 2023-10-02

## 2023-10-02 RX ADMIN — BUPIVACAINE HYDROCHLORIDE 3 ML: 2.5 INJECTION, SOLUTION EPIDURAL; INFILTRATION; INTRACAUDAL at 13:49

## 2023-10-02 RX ADMIN — METHYLPREDNISOLONE ACETATE 80 MG: 40 INJECTION, SUSPENSION INTRA-ARTICULAR; INTRALESIONAL; INTRAMUSCULAR; SOFT TISSUE at 13:50

## 2023-10-02 NOTE — PROGRESS NOTES
Patient Name:  Jin Jordan  MRN:  [id number]    [address] [address]     · 1. Chronic dislocation of right shoulder  · -     Ambulatory referral to Spine & Pain Management; Future  · -     FL injection right shoulder (non arthrogram); Future; Expected date: 10/02/2023  ·   · 2. Post-traumatic osteoarthritis of right shoulder  · -     Ambulatory Referral to Orthopedic Surgery  · -     Ambulatory referral to Spine & Pain Management; Future  · -     FL injection right shoulder (non arthrogram); Future; Expected date: 10/02/2023  ·   · 3. Primary osteoarthritis of left shoulder  · -     Ambulatory Referral to Orthopedic Surgery  ·   · Reviewed today's physical exam findings as well as recent x-ray findings of both the right and left shoulders with patient at time of visit  · Discussed continued conservative management options including activity modification, CS injection, and therapy versus surgical intervention via reverse total shoulder replacement  · Patient is currently scheduled, today, for guided CS injection of the left shoulder for symptomatic relief of pain and inflammation. Referral provided for accompanying guided injection for the right shoulder for symptomatic relief  · Continue activities as tolerated using pain and discomfort as her guide  · Patient will be seen moving forward on as-needed basis  · Patient expresses understanding and is in agreement with this treatment plan    Chief Complaint     Right shoulder pain and weakness    History of the Present Illness     Jin Jordan is a 79 y.o. RHD female who presents with a family friend for consultation regarding chronic right shoulder pain and weakness at the request of Dr. Gem Vasques. She was previously seen in regards this issue on 9/18/2023, at which time she was referred for to pain management for guided CS injection of the left shoulder for severe glenohumeral osteoarthritis.  Patient reports a past medical history that includes surgery 20+ years ago for the right shoulder. On today's presentation she reports achy pain, weakness, and loss of motion in the right shoulder that has been more debilitating since May/June 2022. She has had previous injections with varying degrees of relief. On today's presentation she describes her pain as being deep, achy, and exacerbated by attempted motions at or above shoulder height. She denies any associated bruising, swelling, numbness, or tingling. Review of Systems     Review of Systems   Constitutional: Negative for chills, fever and unexpected weight change. HENT: Negative for hearing loss, nosebleeds and sore throat. Eyes: Negative for pain, redness and visual disturbance. Respiratory: Negative for cough, shortness of breath and wheezing. Cardiovascular: Negative for chest pain, palpitations and leg swelling. Gastrointestinal: Negative for abdominal pain, nausea and vomiting. Endocrine: Negative for polydipsia and polyuria. Genitourinary: Negative for dysuria and hematuria. Musculoskeletal:        As noted in HPI   Skin: Negative for rash and wound. Neurological: Negative for dizziness, numbness and headaches. Psychiatric/Behavioral: Negative for decreased concentration and suicidal ideas. The patient is not nervous/anxious. Physical Exam     /72   Pulse 62   Ht 4' 7" (1.397 m)   Wt 84.5 kg (186 lb 3.2 oz)   BMI 43.28 kg/m²     Right Shoulder: Active range of motion   70 degrees forward flexion, 50  70 degrees abduction, 50  10 degrees external rotation   SI joint Bilateral internal rotation    Passive range of motion   90 degrees of forward flexion     There is diffuse tenderness present over the anterior and superior region. There is 3/5 strength with external rotation testing at the side.     Empty can testing is positive , 3/5  Belly press test is positive, 3/5  Conway test is positive  Kearny's test is positive   Speed's test is Negative  The patient is neurovascularly intact distally in the extremity. Eyes:  Anicteric sclerae. Neck:  Supple. Lungs:  Normal respiratory effort. Uses nasal oxygen  Cardiovascular:  Capillary refill is less than 2 seconds. Skin:  Intact without erythema. Neurologic:  Sensation grossly intact to light touch. Psychiatric:  Mood and affect are appropriate. Data Review     Attending Physician has personally reviewed pertinent imaging in PACS, impression is as follows:    Review of radiographic series taken 9/18/2023 of the right shoulder shows deformity of the humeral head likely indicative of AVN with collapse, chronic glenohumeral dislocation, large calcification versus loose body adjacent to the lateral aspect of the proximal humerus, well visualized displaced suture anchors consistent with previous surgical history.     Review of radiographic series taken 8/11/2023 of the left shoulder significant for advanced glenohumeral osteoarthritis with erosion of the glenoid, significant joint space loss, diffuse osteophyte formation, and subchondral cystic formation of the concave and convex surfaces    Past Medical History:   Diagnosis Date   • Anemia 08/22/2018   • Anxiety    • Arthritis    • AVB (atrioventricular block)     first degree   • Cataract    • CHF (congestive heart failure) ([facility])    • COPD, mild ([facility])    • Coronary artery disease    • Dislocation of right shoulder joint    • Frequent UTI    • GERD (gastroesophageal reflux disease)    • H/O: pneumonia    • Heme positive stool    • Hyperlipidemia    • Hypertension    • Hypothyroidism    • Morbid obesity with BMI of 50.0-59.9, adult ([address])    • Obesity, morbid ([address]) 08/22/2018   • JANICE on CPAP    • Pulmonary hypertension ([address]) 08/22/2018   • Severe aortic stenosis    • Simple goiter    • Skin cyst     within the armpits, right   • Wears glasses        Past Surgical History:   Procedure Laterality Date   • BREAST BIOPSY     • CARDIAC CATHETERIZATION     • CARPAL TUNNEL RELEASE Bilateral    • CHOLECYSTECTOMY     • DILATION AND CURETTAGE OF UTERUS     • HYSTEROSCOPY     • MASTOID SURGERY     • ND COLONOSCOPY FLX DX W/COLLJ SPEC WHEN PFRMD N/A 9/6/2018    Procedure: COLONOSCOPY;  Surgeon: Deb Horowitz MD;  Location: [facility]; Service: Gastroenterology   • ND ECHO TRANSESOPHAG R-T 2D W/PRB IMG ACQUISJ I&R N/A 10/9/2018    Procedure: INTRA-OP TRANSESOPHAGEAL ECHOCARDIOGRAM (GARRISON); Surgeon: Deepthi De La Garza DO;  Location: [facility];  Service: Cardiac Surgery   • ND ESOPHAGOGASTRODUODENOSCOPY TRANSORAL DIAGNOSTIC N/A 8/31/2018    Procedure: ESOPHAGOGASTRODUODENOSCOPY (EGD); Surgeon: Deb Horowitz MD;  Location: [facility]; Service: Gastroenterology   • ND REPLACE AORTIC VALVE OPENFEMORAL ARTERY APPROACH N/A 10/9/2018    Procedure: REPLACEMENT AORTIC VALVE TRANSCATHETER (TAVR) TRANSFEMORAL W/ 23 MM MENDOZA NOE S3 VALVE (ACCESS OF LEFT); Surgeon: Deepthi De La Garza DO;  Location: [facility];  Service: Cardiac Surgery   • TOTAL HIP ARTHROPLASTY Left 2007   • TOTAL KNEE ARTHROPLASTY Bilateral        Allergies   Allergen Reactions   • Latex Rash   • Neosporin [Neomycin-Bacitracin Zn-Polymyx] Rash and Other (See Comments)     hives per [facility] order       Current Outpatient Medications on File Prior to Visit   Medication Sig Dispense Refill   • acetaminophen (TYLENOL) 500 mg tablet Take 500 mg by mouth every 6 (six) hours as needed     • aspirin (ECOTRIN LOW STRENGTH) 81 mg EC tablet Take 1 tablet (81 mg total) by mouth daily 100 tablet 0   • b complex vitamins capsule Take 1 capsule by mouth 2 (two) times a day       • Calcium Carb-Cholecalciferol (CALCIUM 600 + D PO) Take 1 tablet by mouth 2 (two) times a day     • Cranberry 1000 MG CAPS Take 4,200 mg by mouth 2 (two) times a day     • Cranberry 250 MG TABS Take by mouth     • Fesoterodine Fumarate ER (Toviaz) 8 MG TB24 Take 8 mg by mouth daily.  Not taking  Indications: Urinary Incontinence     • furosemide (LASIX) 20 mg tablet Take 1 tablet (20 mg total) by mouth daily as needed (wt gain) 30 tablet 0   • levothyroxine 50 mcg tablet TAKE 1 TABLET BY MOUTH EVERY DAY 90 tablet 1   • lidocaine (LIDODERM) 5 % Apply 1 patch topically over 12 hours daily Remove & Discard patch within 12 hours or as directed by MD Do not start before August 19, 2023. 15 patch 0   • methocarbamol (ROBAXIN) 500 mg tablet Take 1 tablet (500 mg total) by mouth 3 (three) times a day 30 tablet 0   • Myrbetriq 50 MG TB24 Take 1 tablet by mouth daily     • nystatin (MYCOSTATIN) powder Apply topically 2 (two) times a day 60 g 1   • olmesartan (BENICAR) 5 mg tablet TAKE 2 TABLETS BY MOUTH EVERY  tablet 3   • omeprazole (PriLOSEC) 40 MG capsule TAKE 1 CAPSULE BY MOUTH TWICE A  capsule 1   • oxygen gas Inhale 2 L/min continuous. Indications: copd     • sertraline (ZOLOFT) 100 mg tablet TAKE 1 TABLET BY MOUTH EVERY DAY 90 tablet 0   • simvastatin (ZOCOR) 40 mg tablet TAKE 1 TABLET BY MOUTH EVERYDAY AT BEDTIME 90 tablet 1   • [DISCONTINUED] albuterol (2.5 mg/3 mL) 0.083 % nebulizer solution Take 3 mL (2.5 mg total) by nebulization every 6 (six) hours as needed for wheezing or shortness of breath (Patient not taking: Reported on 9/19/2023) 360 mL 5   • [DISCONTINUED] albuterol (PROVENTIL HFA,VENTOLIN HFA) 90 mcg/act inhaler Inhale 2 puffs every 6 (six) hours as needed for wheezing (Patient not taking: Reported on 9/19/2023) 1 Inhaler 3   • [DISCONTINUED] budesonide-formoterol (Symbicort) 160-4.5 mcg/act inhaler Inhale 2 puffs 2 (two) times a day (Patient not taking: Reported on 9/19/2023) 10.2 g 3   • [DISCONTINUED] cyclobenzaprine (FLEXERIL) 5 mg tablet Take 1 tablet (5 mg total) by mouth 2 (two) times a day as needed for muscle spasms 30 tablet 0   • [DISCONTINUED] meloxicam (Mobic) 15 mg tablet Take 1 tablet (15 mg total) by mouth daily 7 tablet 0     No current facility-administered medications on file prior to visit.        Social History     Tobacco Use   • Smoking status: Never   • Smokeless tobacco: Never   Vaping Use   • Vaping Use: Never used   Substance Use Topics   • Alcohol use: No   • Drug use: No       Family History   Problem Relation Age of Onset   • Diabetes Mother    • Stroke Mother    • Cancer Father    • Lung cancer Father    • Diabetes Sister    • Heart disease Sister    • Hypertension Sister    • Coronary artery disease Family    • Diabetes Family    • Hypertension Family    • Cancer Family    • Stroke Family    • Thyroid disease Neg Hx          Procedures Performed     Procedures  No procedures performed this visit      Scribe Attestation    I,:  Lia Smith am acting as a scribe while in the presence of the attending physician.:       I,:  Dea Obregon DO personally performed the services described in this documentation    as scribed in my presence.:

## 2023-10-02 NOTE — PROGRESS NOTES
Large joint arthrocentesis: L glenohumeral  Universal Protocol:  Consent: Verbal consent obtained. Written consent obtained. Risks and benefits: risks, benefits and alternatives were discussed  Consent given by: patient  Time out: Immediately prior to procedure a "time out" was called to verify the correct patient, procedure, equipment, support staff and site/side marked as required. Timeout called at: 10/2/2023 1:00 PM.  Patient understanding: patient states understanding of the procedure being performed  Patient consent: the patient's understanding of the procedure matches consent given  Procedure consent: procedure consent matches procedure scheduled  Relevant documents: relevant documents present and verified  Test results: test results available and properly labeled  Site marked: the operative site was marked  Radiology Images displayed and confirmed.  If images not available, report reviewed: imaging studies available  Required items: required blood products, implants, devices, and special equipment available  Patient identity confirmed: verbally with patient and provided demographic data    Supporting Documentation  Indications: pain   Procedure Details  Location: shoulder - L glenohumeral  Preparation: Patient was prepped and draped in the usual sterile fashion  Needle size: 22 G  Ultrasound guidance: yes  Approach: posterior    Patient tolerance: patient tolerated the procedure well with no immediate complications  Dressing:  Sterile dressing applied    PROCEDURE NOTE    PATIENT NAME:  Judson Sahu    MEDICAL RECORD NUMBER:  [id number]    YOB: 1940    DATE OF PROCEDURE:  10/02/23    PROCEDURE: left glenohumeral joint steroid injection under ultrasound guidance     PREPROCEDURE DIAGNOSIS: Osteoarthritis of shoulder     PHYSICIAN: Marina Lux MD     MEDICATIONS INJECTED: 2 mL of Depomedrol 40mg/ml and 3 ml 0.25% preservative-free bupivacaine       SEDATION MEDICATIONS: None     ESTIMATED BLOOD LOSS: None     COMPLICATIONS: None     TECHNIQUE: Time-out was taken to identify the correct patient, procedure and side prior to starting the procedure. While in the sitting position, the patient was prepped and draped in the usual sterile fashion using ChloraPrep  as well as sterile towels. The target site was determined under ultrasound. A 21-gauge needle was advanced under ultrasound guidance from a poserior appraoch. When the tip of the needle was thought to be in the appropriate position along the humeral head, aspiration was attempted to check for intravascular placement. Care was taken to avoid injection of the labrum. Medication was then injected slowly. The procedure was completed without complications and was tolerated well. The patient was monitored after the procedure. The patient was given post-procedure and discharge instructions to follow at home.  The patient was discharged in stable condition and instructed that we would call in 7 days for follow-up

## 2023-10-03 ENCOUNTER — HOME CARE VISIT (OUTPATIENT)
Dept: HOME HEALTH SERVICES | Facility: HOME HEALTHCARE | Age: 83
End: 2023-10-03
Payer: MEDICARE

## 2023-10-03 VITALS — SYSTOLIC BLOOD PRESSURE: 120 MMHG | HEART RATE: 68 BPM | DIASTOLIC BLOOD PRESSURE: 60 MMHG | OXYGEN SATURATION: 93 %

## 2023-10-03 PROCEDURE — G0157 HHC PT ASSISTANT EA 15: HCPCS

## 2023-10-04 ENCOUNTER — HOME CARE VISIT (OUTPATIENT)
Dept: HOME HEALTH SERVICES | Facility: HOME HEALTHCARE | Age: 83
End: 2023-10-04
Payer: MEDICARE

## 2023-10-04 ENCOUNTER — PATIENT OUTREACH (OUTPATIENT)
Dept: CASE MANAGEMENT | Facility: OTHER | Age: 83
End: 2023-10-04

## 2023-10-04 VITALS
SYSTOLIC BLOOD PRESSURE: 140 MMHG | TEMPERATURE: 96.8 F | OXYGEN SATURATION: 97 % | HEART RATE: 61 BPM | DIASTOLIC BLOOD PRESSURE: 80 MMHG | RESPIRATION RATE: 16 BRPM

## 2023-10-04 PROCEDURE — G0299 HHS/HOSPICE OF RN EA 15 MIN: HCPCS

## 2023-10-04 NOTE — PROGRESS NOTES
I spoke with Vic Lundborg who saw [address] [facility] on 10/2 and received a steroid  joint injection of the left shoulder. She reports she still has pain. I advised her it may take longer than 2 days to have some relief. She was told to take Tylenol but states she does not have relief. I advised her to talk with her PCP when she sees her 10/24. Patient continues to have physical thrapy at home with [facility]. She will see the pain specialist on 10/16 and I will follow up with her after that appointment. She has my contact information and I asked her to call me if I can assist her.

## 2023-10-05 ENCOUNTER — TELEPHONE (OUTPATIENT)
Age: 83
End: 2023-10-05

## 2023-10-05 ENCOUNTER — HOME CARE VISIT (OUTPATIENT)
Dept: HOME HEALTH SERVICES | Facility: HOME HEALTHCARE | Age: 83
End: 2023-10-05
Payer: MEDICARE

## 2023-10-05 VITALS — SYSTOLIC BLOOD PRESSURE: 128 MMHG | OXYGEN SATURATION: 96 % | DIASTOLIC BLOOD PRESSURE: 70 MMHG | HEART RATE: 52 BPM

## 2023-10-05 PROCEDURE — G0157 HHC PT ASSISTANT EA 15: HCPCS

## 2023-10-05 NOTE — TELEPHONE ENCOUNTER
Caller: Homero Maldonado, pt    Doctor: Erin Mcghee    Reason for call: pt called to ask if she can start using heat on the injections site? and if she should be doing PT    Call back#: [phone]

## 2023-10-06 DIAGNOSIS — G89.29 CHRONIC LEFT SHOULDER PAIN: ICD-10-CM

## 2023-10-06 DIAGNOSIS — M25.512 CHRONIC LEFT SHOULDER PAIN: ICD-10-CM

## 2023-10-06 NOTE — TELEPHONE ENCOUNTER
Patient call in to find out if she can take Fish Oil pills along with the medication she's on now? Please advise.   Thanks

## 2023-10-06 NOTE — TELEPHONE ENCOUNTER
Pt s/p 10/2 left gelnohumeral injection. Pt reports no relief. Advised she give injection at least 1 week and SPA will cb with update.  Advised she can try heat to area, or she can try topical lidocaine patch or cream. Pt taking tylenol prn

## 2023-10-09 ENCOUNTER — TELEPHONE (OUTPATIENT)
Dept: RADIOLOGY | Facility: MEDICAL CENTER | Age: 83
End: 2023-10-09

## 2023-10-09 NOTE — TELEPHONE ENCOUNTER
Patient Reports      minimal   %     improvement post injection    Pain Level  8   /10  Still pain      Patient would   Like a call back to see if she should continue therapy      We will call back next week for another update.

## 2023-10-10 ENCOUNTER — HOME CARE VISIT (OUTPATIENT)
Dept: HOME HEALTH SERVICES | Facility: HOME HEALTHCARE | Age: 83
End: 2023-10-10
Payer: MEDICARE

## 2023-10-10 ENCOUNTER — LAB REQUISITION (OUTPATIENT)
Dept: LAB | Facility: HOSPITAL | Age: 83
End: 2023-10-10
Payer: MEDICARE

## 2023-10-10 ENCOUNTER — TELEPHONE (OUTPATIENT)
Dept: FAMILY MEDICINE CLINIC | Facility: CLINIC | Age: 83
End: 2023-10-10

## 2023-10-10 VITALS
HEART RATE: 59 BPM | RESPIRATION RATE: 17 BRPM | TEMPERATURE: 96.6 F | OXYGEN SATURATION: 95 % | DIASTOLIC BLOOD PRESSURE: 70 MMHG | SYSTOLIC BLOOD PRESSURE: 110 MMHG

## 2023-10-10 DIAGNOSIS — D72.829 ELEVATED WHITE BLOOD CELL COUNT, UNSPECIFIED: ICD-10-CM

## 2023-10-10 LAB
ANION GAP SERPL CALCULATED.3IONS-SCNC: 6 MMOL/L
BASOPHILS # BLD AUTO: 0.06 THOUSANDS/ÂΜL (ref 0–0.1)
BASOPHILS NFR BLD AUTO: 1 % (ref 0–1)
BUN SERPL-MCNC: 33 MG/DL (ref 5–25)
CALCIUM SERPL-MCNC: 10.1 MG/DL (ref 8.4–10.2)
CHLORIDE SERPL-SCNC: 106 MMOL/L (ref 96–108)
CO2 SERPL-SCNC: 23 MMOL/L (ref 21–32)
CREAT SERPL-MCNC: 0.86 MG/DL (ref 0.6–1.3)
EOSINOPHIL # BLD AUTO: 0.73 THOUSAND/ÂΜL (ref 0–0.61)
EOSINOPHIL NFR BLD AUTO: 8 % (ref 0–6)
ERYTHROCYTE [DISTWIDTH] IN BLOOD BY AUTOMATED COUNT: 17 % (ref 11.6–15.1)
GFR SERPL CREATININE-BSD FRML MDRD: 62 ML/MIN/1.73SQ M
GLUCOSE SERPL-MCNC: 122 MG/DL (ref 65–140)
HCT VFR BLD AUTO: 36.2 % (ref 34.8–46.1)
HGB BLD-MCNC: 11 G/DL (ref 11.5–15.4)
IMM GRANULOCYTES # BLD AUTO: 0.04 THOUSAND/UL (ref 0–0.2)
IMM GRANULOCYTES NFR BLD AUTO: 1 % (ref 0–2)
LYMPHOCYTES # BLD AUTO: 2.08 THOUSANDS/ÂΜL (ref 0.6–4.47)
LYMPHOCYTES NFR BLD AUTO: 24 % (ref 14–44)
MCH RBC QN AUTO: 27.1 PG (ref 26.8–34.3)
MCHC RBC AUTO-ENTMCNC: 30.4 G/DL (ref 31.4–37.4)
MCV RBC AUTO: 89 FL (ref 82–98)
MONOCYTES # BLD AUTO: 0.52 THOUSAND/ÂΜL (ref 0.17–1.22)
MONOCYTES NFR BLD AUTO: 6 % (ref 4–12)
NEUTROPHILS # BLD AUTO: 5.39 THOUSANDS/ÂΜL (ref 1.85–7.62)
NEUTS SEG NFR BLD AUTO: 60 % (ref 43–75)
NRBC BLD AUTO-RTO: 0 /100 WBCS
PLATELET # BLD AUTO: 434 THOUSANDS/UL (ref 149–390)
PMV BLD AUTO: 9.5 FL (ref 8.9–12.7)
POTASSIUM SERPL-SCNC: 4.5 MMOL/L (ref 3.5–5.3)
RBC # BLD AUTO: 4.06 MILLION/UL (ref 3.81–5.12)
SODIUM SERPL-SCNC: 135 MMOL/L (ref 135–147)
WBC # BLD AUTO: 8.82 THOUSAND/UL (ref 4.31–10.16)

## 2023-10-10 PROCEDURE — 80048 BASIC METABOLIC PNL TOTAL CA: CPT | Performed by: NURSE PRACTITIONER

## 2023-10-10 PROCEDURE — G0151 HHCP-SERV OF PT,EA 15 MIN: HCPCS

## 2023-10-10 PROCEDURE — G0299 HHS/HOSPICE OF RN EA 15 MIN: HCPCS

## 2023-10-10 PROCEDURE — 85025 COMPLETE CBC W/AUTO DIFF WBC: CPT | Performed by: NURSE PRACTITIONER

## 2023-10-10 RX ORDER — METHOCARBAMOL 500 MG/1
500 TABLET, FILM COATED ORAL 3 TIMES DAILY
Qty: 30 TABLET | Refills: 0 | Status: SHIPPED | OUTPATIENT
Start: 2023-10-10

## 2023-10-10 NOTE — TELEPHONE ENCOUNTER
----- Message from MABEL Delaney sent at 10/10/2023 12:01 PM EDT -----  Please let patient know that her white blood cell count has normalized, anemia is much improved as is her kidney function.

## 2023-10-12 ENCOUNTER — HOME CARE VISIT (OUTPATIENT)
Dept: HOME HEALTH SERVICES | Facility: HOME HEALTHCARE | Age: 83
End: 2023-10-12
Payer: MEDICARE

## 2023-10-12 VITALS — SYSTOLIC BLOOD PRESSURE: 128 MMHG | DIASTOLIC BLOOD PRESSURE: 72 MMHG

## 2023-10-12 PROCEDURE — G0151 HHCP-SERV OF PT,EA 15 MIN: HCPCS

## 2023-10-12 NOTE — CASE COMMUNICATION
Patient discharged from [facility]. Pain remains high with use but patient demo indep in HEP and understands pain management and avoidance of irritations. She is indep in mobility with [id number]. DC to [facility] at this time.

## 2023-10-16 ENCOUNTER — PROCEDURE VISIT (OUTPATIENT)
Dept: PAIN MEDICINE | Facility: CLINIC | Age: 83
End: 2023-10-16
Payer: MEDICARE

## 2023-10-16 VITALS
HEIGHT: 55 IN | BODY MASS INDEX: 42.54 KG/M2 | DIASTOLIC BLOOD PRESSURE: 77 MMHG | SYSTOLIC BLOOD PRESSURE: 135 MMHG | WEIGHT: 183.8 LBS | HEART RATE: 51 BPM

## 2023-10-16 DIAGNOSIS — M19.011 PRIMARY OSTEOARTHRITIS, RIGHT SHOULDER: Primary | ICD-10-CM

## 2023-10-16 PROCEDURE — 20611 DRAIN/INJ JOINT/BURSA W/US: CPT | Performed by: STUDENT IN AN ORGANIZED HEALTH CARE EDUCATION/TRAINING PROGRAM

## 2023-10-16 RX ORDER — METHYLPREDNISOLONE ACETATE 40 MG/ML
80 INJECTION, SUSPENSION INTRA-ARTICULAR; INTRALESIONAL; INTRAMUSCULAR; SOFT TISSUE ONCE
Status: COMPLETED | OUTPATIENT
Start: 2023-10-16 | End: 2023-10-16

## 2023-10-16 RX ORDER — BUPIVACAINE HYDROCHLORIDE 2.5 MG/ML
3 INJECTION, SOLUTION EPIDURAL; INFILTRATION; INTRACAUDAL ONCE
Status: COMPLETED | OUTPATIENT
Start: 2023-10-16 | End: 2023-10-16

## 2023-10-16 RX ADMIN — METHYLPREDNISOLONE ACETATE 80 MG: 40 INJECTION, SUSPENSION INTRA-ARTICULAR; INTRALESIONAL; INTRAMUSCULAR; SOFT TISSUE at 12:09

## 2023-10-16 RX ADMIN — BUPIVACAINE HYDROCHLORIDE 3 ML: 2.5 INJECTION, SOLUTION EPIDURAL; INFILTRATION; INTRACAUDAL at 12:06

## 2023-10-16 NOTE — PROGRESS NOTES
Large joint arthrocentesis: R glenohumeral  [place] Protocol:  Consent: Verbal consent obtained. Written consent obtained. Risks and benefits: risks, benefits and alternatives were discussed  Consent given by: patient  Time out: Immediately prior to procedure a "time out" was called to verify the correct patient, procedure, equipment, support staff and site/side marked as required. Timeout called at: 10/16/2023 10:40 AM.  Patient understanding: patient states understanding of the procedure being performed  Patient consent: the patient's understanding of the procedure matches consent given  Procedure consent: procedure consent matches procedure scheduled  Relevant documents: relevant documents present and verified  Test results: test results available and properly labeled  Site marked: the operative site was marked  Radiology Images displayed and confirmed.  If images not available, report reviewed: imaging studies available  Required items: required blood products, implants, devices, and special equipment available  Patient identity confirmed: verbally with patient and provided demographic data  Supporting Documentation  Indications: pain   Procedure Details  Location: shoulder - R glenohumeral  Preparation: Patient was prepped and draped in the usual sterile fashion  Needle size: 22 G  Ultrasound guidance: yes  Approach: posterior    Patient tolerance: patient tolerated the procedure well with no immediate complications  Dressing:  Sterile dressing applied    PROCEDURE NOTE    PATIENT NAME:  [address] [place] [address] RECORD NUMBER:  [id number]    YOB: 1940    DATE OF PROCEDURE:  10/16/23    PROCEDURE:RIGHT glenohumeral joint steroid injection under ultrasound guidance     PREPROCEDURE DIAGNOSIS: Osteoarthritis of shoulder     PHYSICIAN: Chas Ballesteros MD     MEDICATIONS INJECTED: 2 mL of Depomedrol 40mg/ml and 3 ml 0.25% preservative-free bupivacaine        SEDATION MEDICATIONS: None     ESTIMATED BLOOD LOSS: None     COMPLICATIONS: None     TECHNIQUE: Time-out was taken to identify the correct patient, procedure and side prior to starting the procedure. While in the sitting position, the patient was prepped and draped in the usual sterile fashion using ChloraPrep  as well as sterile towels. The target site was determined under ultrasound. A 21-gauge needle was advanced under ultrasound guidance from a poserior appraoch. When the tip of the needle was thought to be in the appropriate position along the humeral head, aspiration was attempted to check for intravascular placement. Care was taken to avoid injection of the labrum. Medication was then injected slowly. The procedure was completed without complications and was tolerated well. The patient was monitored after the procedure. The patient was given post-procedure and discharge instructions to follow at home.  The patient was discharged in stable condition and instructed that we would call in 7 days for follow-up

## 2023-10-17 ENCOUNTER — RA CDI HCC (OUTPATIENT)
Dept: OTHER | Facility: HOSPITAL | Age: 83
End: 2023-10-17

## 2023-10-17 NOTE — PROGRESS NOTES
[address] [facility] coding opportunities          Chart Reviewed number of suggestions sent to Provider: 1   I13.0    Patients Insurance     Medicare Insurance: [employer]

## 2023-10-23 ENCOUNTER — TELEPHONE (OUTPATIENT)
Dept: PAIN MEDICINE | Facility: CLINIC | Age: 83
End: 2023-10-23

## 2023-10-23 ENCOUNTER — PATIENT OUTREACH (OUTPATIENT)
Dept: CASE MANAGEMENT | Facility: OTHER | Age: 83
End: 2023-10-23

## 2023-10-23 NOTE — TELEPHONE ENCOUNTER
Pt reports 40% improvement post inj   Pain level 0/10  She said when she uses her shoulder is when she has pain

## 2023-10-24 ENCOUNTER — OFFICE VISIT (OUTPATIENT)
Dept: FAMILY MEDICINE CLINIC | Facility: CLINIC | Age: 83
End: 2023-10-24
Payer: MEDICARE

## 2023-10-24 VITALS
WEIGHT: 182 LBS | DIASTOLIC BLOOD PRESSURE: 80 MMHG | OXYGEN SATURATION: 95 % | BODY MASS INDEX: 42.12 KG/M2 | HEIGHT: 55 IN | HEART RATE: 42 BPM | SYSTOLIC BLOOD PRESSURE: 130 MMHG

## 2023-10-24 DIAGNOSIS — Z00.00 MEDICARE ANNUAL WELLNESS VISIT, SUBSEQUENT: Primary | ICD-10-CM

## 2023-10-24 DIAGNOSIS — I50.32 CHRONIC DIASTOLIC (CONGESTIVE) HEART FAILURE (HCC): ICD-10-CM

## 2023-10-24 DIAGNOSIS — K21.9 GASTROESOPHAGEAL REFLUX DISEASE WITHOUT ESOPHAGITIS: ICD-10-CM

## 2023-10-24 DIAGNOSIS — E66.01 OBESITY, MORBID (HCC): Chronic | ICD-10-CM

## 2023-10-24 DIAGNOSIS — Z00.00 HEALTHCARE MAINTENANCE: ICD-10-CM

## 2023-10-24 DIAGNOSIS — I10 PRIMARY HYPERTENSION: ICD-10-CM

## 2023-10-24 DIAGNOSIS — I27.20 PULMONARY HYPERTENSION (HCC): Chronic | ICD-10-CM

## 2023-10-24 DIAGNOSIS — Z95.2 S/P TAVR (TRANSCATHETER AORTIC VALVE REPLACEMENT): ICD-10-CM

## 2023-10-24 DIAGNOSIS — N18.31 STAGE 3A CHRONIC KIDNEY DISEASE (HCC): ICD-10-CM

## 2023-10-24 DIAGNOSIS — J44.9 COPD, MILD (HCC): ICD-10-CM

## 2023-10-24 DIAGNOSIS — E03.9 ACQUIRED HYPOTHYROIDISM: ICD-10-CM

## 2023-10-24 DIAGNOSIS — F33.9 DEPRESSION, RECURRENT (HCC): ICD-10-CM

## 2023-10-24 DIAGNOSIS — R26.2 AMBULATORY DYSFUNCTION: ICD-10-CM

## 2023-10-24 DIAGNOSIS — M19.012 PRIMARY OSTEOARTHRITIS OF LEFT SHOULDER: ICD-10-CM

## 2023-10-24 PROBLEM — D72.829 LEUKOCYTOSIS: Status: RESOLVED | Noted: 2023-08-14 | Resolved: 2023-10-24

## 2023-10-24 PROBLEM — J45.909 REACTIVE AIRWAY DISEASE WITHOUT COMPLICATION: Status: RESOLVED | Noted: 2018-01-31 | Resolved: 2023-10-24

## 2023-10-24 PROBLEM — N18.9 ACUTE KIDNEY INJURY SUPERIMPOSED ON CKD: Status: RESOLVED | Noted: 2023-08-14 | Resolved: 2023-10-24

## 2023-10-24 PROBLEM — N17.9 ACUTE KIDNEY INJURY SUPERIMPOSED ON CKD: Status: RESOLVED | Noted: 2023-08-14 | Resolved: 2023-10-24

## 2023-10-24 PROCEDURE — G0439 PPPS, SUBSEQ VISIT: HCPCS | Performed by: NURSE PRACTITIONER

## 2023-10-24 PROCEDURE — 99214 OFFICE O/P EST MOD 30 MIN: CPT | Performed by: NURSE PRACTITIONER

## 2023-10-24 NOTE — ASSESSMENT & PLAN NOTE
Lab Results   Component Value Date    EGFR 62 10/10/2023    EGFR 29 08/18/2023    EGFR 36 08/17/2023    CREATININE 0.86 10/10/2023    CREATININE 1.62 (H) 08/18/2023    CREATININE 1.33 (H) 08/17/2023   Kidney function is much improved since her hospital stay. Advised to increase hydration. Avoid NSAIDs and limit furosemide if possible.

## 2023-10-24 NOTE — PATIENT INSTRUCTIONS
Medicare Preventive Visit Patient Instructions  Thank you for completing your Welcome to Medicare Visit or Medicare Annual Wellness Visit today. Your next wellness visit will be due in one year (10/24/2024). The screening/preventive services that you may require over the next 5-10 years are detailed below. Some tests may not apply to you based off risk factors and/or age. Screening tests ordered at today's visit but not completed yet may show as past due. Also, please note that scanned in results may not display below. Preventive Screenings:  Service Recommendations Previous Testing/Comments   Colorectal Cancer Screening  * Colonoscopy    * Fecal Occult Blood Test (FOBT)/Fecal Immunochemical Test (FIT)  * Fecal DNA/Cologuard Test  * Flexible Sigmoidoscopy Age: 43-73 years old   Colonoscopy: every 10 years (may be performed more frequently if at higher risk)  OR  FOBT/FIT: every 1 year  OR  Cologuard: every 3 years  OR  Sigmoidoscopy: every 5 years  Screening may be recommended earlier than age 39 if at higher risk for colorectal cancer. Also, an individualized decision between you and your healthcare provider will decide whether screening between the ages of 77-80 would be appropriate. Colonoscopy: Not on file  FOBT/FIT: 08/16/2021  Cologuard: Not on file  Sigmoidoscopy: Not on file          Breast Cancer Screening Age: 36 years old  Frequency: every 1-2 years  Not required if history of left and right mastectomy Mammogram: 03/01/2017        Cervical Cancer Screening Between the ages of 21-29, pap smear recommended once every 3 years. Between the ages of 32-69, can perform pap smear with HPV co-testing every 5 years.    Recommendations may differ for women with a history of total hysterectomy, cervical cancer, or abnormal pap smears in past. Pap Smear: Not on file    Screening Not Indicated   Hepatitis C Screening Once for adults born between [address] [place] [address]  More frequently in patients at high risk for Hepatitis C Hep C Antibody: Not on file        Diabetes Screening 1-2 times per year if you're at risk for diabetes or have pre-diabetes Fasting glucose: 106 mg/dL (7/11/2023)  A1C: 6.1 % (8/18/2023)  Screening Current   Cholesterol Screening Once every 5 years if you don't have a lipid disorder. May order more often based on risk factors. Lipid panel: 07/11/2023    Screening Not Indicated  History Lipid Disorder     Other Preventive Screenings Covered by Medicare:  Abdominal Aortic Aneurysm (AAA) Screening: covered once if your at risk. You're considered to be at risk if you have a family history of AAA. Lung Cancer Screening: covers low dose CT scan once per year if you meet all of the following conditions: (1) Age 48-67; (2) No signs or symptoms of lung cancer; (3) Current smoker or have quit smoking within the last 15 years; (4) You have a tobacco smoking history of at least 20 pack years (packs per day multiplied by number of years you smoked); (5) You get a written order from a healthcare provider. Glaucoma Screening: covered annually if you're considered high risk: (1) You have diabetes OR (2) Family history of glaucoma OR (3)  aged 48 and older OR (3)  American aged 72 and older  Osteoporosis Screening: covered every 2 years if you meet one of the following conditions: (1) You're estrogen deficient and at risk for osteoporosis based off medical history and other findings; (2) Have a vertebral abnormality; (3) On glucocorticoid therapy for more than 3 months; (4) Have primary hyperparathyroidism; (5) On osteoporosis medications and need to assess response to drug therapy. Last bone density test (DXA Scan): 10/19/2022. HIV Screening: covered annually if you're between the age of 14-79. Also covered annually if you are younger than 13 and older than 72 with risk factors for HIV infection. For pregnant patients, it is covered up to 3 times per pregnancy.     Immunizations:  Immunization Recommendations   Influenza Vaccine Annual influenza vaccination during flu season is recommended for all persons aged >= 6 months who do not have contraindications   Pneumococcal Vaccine   * Pneumococcal conjugate vaccine = PCV13 (Prevnar 13), PCV15 (Vaxneuvance), PCV20 (Prevnar 20)  * Pneumococcal polysaccharide vaccine = PPSV23 (Pneumovax) Adults 25-02 yo with certain risk factors or if 69+ yo  If never received any pneumonia vaccine: recommend Prevnar 20 (PCV20)  Give PCV20 if previously received 1 dose of PCV13 or PPSV23   Hepatitis B Vaccine 3 dose series if at intermediate or high risk (ex: diabetes, end stage renal disease, liver disease)   Respiratory syncytial virus (RSV) Vaccine - COVERED BY MEDICARE PART D  * RSVPreF3 (Arexvy) CDC recommends that adults 61years of age and older may receive a single dose of RSV vaccine using shared clinical decision-making (SCDM)   Tetanus (Td) Vaccine - COST NOT COVERED BY MEDICARE PART B Following completion of primary series, a booster dose should be given every 10 years to maintain immunity against tetanus. Td may also be given as tetanus wound prophylaxis. Tdap Vaccine - COST NOT COVERED BY MEDICARE PART B Recommended at least once for all adults. For pregnant patients, recommended with each pregnancy. Shingles Vaccine (Shingrix) - COST NOT COVERED BY MEDICARE PART B  2 shot series recommended in those 19 years and older who have or will have weakened immune systems or those 50 years and older     Health Maintenance Due:  There are no preventive care reminders to display for this patient. Immunizations Due:      Topic Date Due   • COVID-19 Vaccine (3 - Pfizer series) 06/02/2021   • Influenza Vaccine (1) Never done     Advance Directives   What are advance directives? Advance directives are legal documents that state your wishes and plans for medical care. These plans are made ahead of time in case you lose your ability to make decisions for yourself.  Advance directives can apply to any medical decision, such as the treatments you want, and if you want to donate organs. What are the types of advance directives? There are many types of advance directives, and each state has rules about how to use them. You may choose a combination of any of the following:  Living will: This is a written record of the treatment you want. You can also choose which treatments you do not want, which to limit, and which to stop at a certain time. This includes surgery, medicine, IV fluid, and tube feedings. Durable power of  for [facility]): This is a written record that states who you want to make healthcare choices for you when you are unable to make them for yourself. This person, called a proxy, is usually a family member or a friend. You may choose more than 1 proxy. Do not resuscitate (DNR) order:  A DNR order is used in case your heart stops beating or you stop breathing. It is a request not to have certain forms of treatment, such as CPR. A DNR order may be included in other types of advance directives. Medical directive: This covers the care that you want if you are in a coma, near death, or unable to make decisions for yourself. You can list the treatments you want for each condition. Treatment may include pain medicine, surgery, blood transfusions, dialysis, IV or tube feedings, and a ventilator (breathing machine). Values history: This document has questions about your views, beliefs, and how you feel and think about life. This information can help others choose the care that you would choose. Why are advance directives important? An advance directive helps you control your care. Although spoken wishes may be used, it is better to have your wishes written down. Spoken wishes can be misunderstood, or not followed. Treatments may be given even if you do not want them.  An advance directive may make it easier for your family to make difficult choices about your care. Fall Prevention    Fall prevention  includes ways to make your home and other areas safer. It also includes ways you can move more carefully to prevent a fall. Health conditions that cause changes in your blood pressure, vision, or muscle strength and coordination may increase your risk for falls. Medicines may also increase your risk for falls if they make you dizzy, weak, or sleepy. Fall prevention tips:   Stand or sit up slowly. Use assistive devices as directed. Wear shoes that fit well and have soles that . Wear a personal alarm. Stay active. Manage your medical conditions. Home Safety Tips:  Add items to prevent falls in the bathroom. Keep paths clear. Install bright lights in your home. Keep items you use often on shelves within reach. Paint or place reflective tape on the edges of your stairs. Urinary Incontinence   Urinary incontinence (UI)  is when you lose control of your bladder. UI develops because your bladder cannot store or empty urine properly. The 3 most common types of UI are stress incontinence, urge incontinence, or both. Medicines:   May be given to help strengthen your bladder control. Report any side effects of medication to your healthcare provider. Do pelvic muscle exercises often:  Your pelvic muscles help you stop urinating. Squeeze these muscles tight for 5 seconds, then relax for 5 seconds. Gradually work up to squeezing for 10 seconds. Do 3 sets of 15 repetitions a day, or as directed. This will help strengthen your pelvic muscles and improve bladder control. Train your bladder:  Go to the bathroom at set times, such as every 2 hours, even if you do not feel the urge to go. You can also try to hold your urine when you feel the urge to go. For example, hold your urine for 5 minutes when you feel the urge to go. As that becomes easier, hold your urine for 10 minutes. Self-care:   Keep a UI record.   Write down how often you leak urine and how much you leak. Make a note of what you were doing when you leaked urine. Drink liquids as directed. You may need to limit the amount of liquid you drink to help control your urine leakage. Do not drink any liquid right before you go to bed. Limit or do not have drinks that contain caffeine or alcohol. Prevent constipation. Eat a variety of high-fiber foods. Good examples are high-fiber cereals, beans, vegetables, and whole-grain breads. Walking is the best way to trigger your intestines to have a bowel movement. Exercise regularly and maintain a healthy weight. Weight loss and exercise will decrease pressure on your bladder and help you control your leakage. Use a catheter as directed  to help empty your bladder. A catheter is a tiny, plastic tube that is put into your bladder to drain your urine. Go to behavior therapy as directed. Behavior therapy may be used to help you learn to control your urge to urinate. Weight Management   Why it is important to manage your weight:  Being overweight increases your risk of health conditions such as heart disease, high blood pressure, type 2 diabetes, and certain types of cancer. It can also increase your risk for osteoarthritis, sleep apnea, and other respiratory problems. Aim for a slow, steady weight loss. Even a small amount of weight loss can lower your risk of health problems. How to lose weight safely:  A safe and healthy way to lose weight is to eat fewer calories and get regular exercise. You can lose up about 1 pound a week by decreasing the number of calories you eat by 500 calories each day. Healthy meal plan for weight management:  A healthy meal plan includes a variety of foods, contains fewer calories, and helps you stay healthy. A healthy meal plan includes the following:  Eat whole-grain foods more often. A healthy meal plan should contain fiber.  Fiber is the part of grains, fruits, and vegetables that is not broken down by your body. Whole-grain foods are healthy and provide extra fiber in your diet. Some examples of whole-grain foods are whole-wheat breads and pastas, oatmeal, brown rice, and bulgur. Eat a variety of vegetables every day. Include dark, leafy greens such as spinach, kale, marlo greens, and mustard greens. Eat yellow and orange vegetables such as carrots, sweet potatoes, and winter squash. Eat a variety of fruits every day. Choose fresh or canned fruit (canned in its own juice or light syrup) instead of juice. Fruit juice has very little or no fiber. Eat low-fat dairy foods. Drink fat-free (skim) milk or 1% milk. Eat fat-free yogurt and low-fat cottage cheese. Try low-fat cheeses such as mozzarella and other reduced-fat cheeses. Choose meat and other protein foods that are low in fat. Choose beans or other legumes such as split peas or lentils. Choose fish, skinless poultry (chicken or turkey), or lean cuts of red meat (beef or pork). Before you cook meat or poultry, cut off any visible fat. Use less fat and oil. Try baking foods instead of frying them. Add less fat, such as margarine, sour cream, regular salad dressing and mayonnaise to foods. Eat fewer high-fat foods. Some examples of high-fat foods include french fries, doughnuts, ice cream, and cakes. Eat fewer sweets. Limit foods and drinks that are high in sugar. This includes candy, cookies, regular soda, and sweetened drinks. Exercise:  Exercise at least 30 minutes per day on most days of the week. Some examples of exercise include walking, biking, dancing, and swimming. You can also fit in more physical activity by taking the stairs instead of the elevator or parking farther away from stores. Ask your healthcare provider about the best exercise plan for you. © Copyright [employer] 2018 Information is for End User's use only and may not be sold, redistributed or otherwise used for commercial purposes.  All illustrations and images included chelsey Gordon are the copyrighted property of [employer] or [employer] [facility] Preventive Visit Patient Instructions  Thank you for completing your Welcome to Medicare Visit or Medicare Annual Wellness Visit today. Your next wellness visit will be due in one year (10/24/2024). The screening/preventive services that you may require over the next 5-10 years are detailed below. Some tests may not apply to you based off risk factors and/or age. Screening tests ordered at today's visit but not completed yet may show as past due. Also, please note that scanned in results may not display below. Preventive Screenings:  Service Recommendations Previous Testing/Comments   Colorectal Cancer Screening  * Colonoscopy    * Fecal Occult Blood Test (FOBT)/Fecal Immunochemical Test (FIT)  * Fecal DNA/Cologuard Test  * Flexible Sigmoidoscopy Age: 43-73 years old   Colonoscopy: every 10 years (may be performed more frequently if at higher risk)  OR  FOBT/FIT: every 1 year  OR  Cologuard: every 3 years  OR  Sigmoidoscopy: every 5 years  Screening may be recommended earlier than age 39 if at higher risk for colorectal cancer. Also, an individualized decision between you and your healthcare provider will decide whether screening between the ages of 77-80 would be appropriate. Colonoscopy: Not on file  FOBT/FIT: 08/16/2021  Cologuard: Not on file  Sigmoidoscopy: Not on file          Breast Cancer Screening Age: 36 years old  Frequency: every 1-2 years  Not required if history of left and right mastectomy Mammogram: 03/01/2017        Cervical Cancer Screening Between the ages of 21-29, pap smear recommended once every 3 years. Between the ages of 32-69, can perform pap smear with HPV co-testing every 5 years.    Recommendations may differ for women with a history of total hysterectomy, cervical cancer, or abnormal pap smears in past. Pap Smear: Not on file    Screening Not Indicated   Hepatitis C Screening Once for adults born between [birth year] and 1966  More frequently in patients at high risk for Hepatitis C Hep C Antibody: Not on file        Diabetes Screening 1-2 times per year if you're at risk for diabetes or have pre-diabetes Fasting glucose: 106 mg/dL (7/11/2023)  A1C: 6.1 % (8/18/2023)  Screening Current   Cholesterol Screening Once every 5 years if you don't have a lipid disorder. May order more often based on risk factors. Lipid panel: 07/11/2023    Screening Not Indicated  History Lipid Disorder     Other Preventive Screenings Covered by Medicare:  Abdominal Aortic Aneurysm (AAA) Screening: covered once if your at risk. You're considered to be at risk if you have a family history of AAA. Lung Cancer Screening: covers low dose CT scan once per year if you meet all of the following conditions: (1) Age 48-67; (2) No signs or symptoms of lung cancer; (3) Current smoker or have quit smoking within the last 15 years; (4) You have a tobacco smoking history of at least 20 pack years (packs per day multiplied by number of years you smoked); (5) You get a written order from a healthcare provider. Glaucoma Screening: covered annually if you're considered high risk: (1) You have diabetes OR (2) Family history of glaucoma OR (3)  aged 48 and older OR (3)  American aged 72 and older  Osteoporosis Screening: covered every 2 years if you meet one of the following conditions: (1) You're estrogen deficient and at risk for osteoporosis based off medical history and other findings; (2) Have a vertebral abnormality; (3) On glucocorticoid therapy for more than 3 months; (4) Have primary hyperparathyroidism; (5) On osteoporosis medications and need to assess response to drug therapy. Last bone density test (DXA Scan): 10/19/2022. HIV Screening: covered annually if you're between the age of 14-79. Also covered annually if you are younger than 13 and older than 72 with risk factors for HIV infection.  For pregnant patients, it is covered up to 3 times per pregnancy. Immunizations:  Immunization Recommendations   Influenza Vaccine Annual influenza vaccination during flu season is recommended for all persons aged >= 6 months who do not have contraindications   Pneumococcal Vaccine   * Pneumococcal conjugate vaccine = PCV13 (Prevnar 13), PCV15 (Vaxneuvance), PCV20 (Prevnar 20)  * Pneumococcal polysaccharide vaccine = PPSV23 (Pneumovax) Adults 95-34 yo with certain risk factors or if 69+ yo  If never received any pneumonia vaccine: recommend Prevnar 20 (PCV20)  Give PCV20 if previously received 1 dose of PCV13 or PPSV23   Hepatitis B Vaccine 3 dose series if at intermediate or high risk (ex: diabetes, end stage renal disease, liver disease)   Respiratory syncytial virus (RSV) Vaccine - COVERED BY MEDICARE PART D  * RSVPreF3 (Arexvy) CDC recommends that adults 61years of age and older may receive a single dose of RSV vaccine using shared clinical decision-making (SCDM)   Tetanus (Td) Vaccine - COST NOT COVERED BY MEDICARE PART B Following completion of primary series, a booster dose should be given every 10 years to maintain immunity against tetanus. Td may also be given as tetanus wound prophylaxis. Tdap Vaccine - COST NOT COVERED BY MEDICARE PART B Recommended at least once for all adults. For pregnant patients, recommended with each pregnancy. Shingles Vaccine (Shingrix) - COST NOT COVERED BY MEDICARE PART B  2 shot series recommended in those 19 years and older who have or will have weakened immune systems or those 50 years and older     Health Maintenance Due:  There are no preventive care reminders to display for this patient. Immunizations Due:      Topic Date Due   • COVID-19 Vaccine (3 - Pfizer series) 06/02/2021   • Influenza Vaccine (1) Never done     Advance Directives   What are advance directives? Advance directives are legal documents that state your wishes and plans for medical care.  These plans are made ahead of time in case you lose your ability to make decisions for yourself. Advance directives can apply to any medical decision, such as the treatments you want, and if you want to donate organs. What are the types of advance directives? There are many types of advance directives, and each state has rules about how to use them. You may choose a combination of any of the following:  Living will: This is a written record of the treatment you want. You can also choose which treatments you do not want, which to limit, and which to stop at a certain time. This includes surgery, medicine, IV fluid, and tube feedings. Durable power of  for healthcare [facility]): This is a written record that states who you want to make healthcare choices for you when you are unable to make them for yourself. This person, called a proxy, is usually a family member or a friend. You may choose more than 1 proxy. Do not resuscitate (DNR) order:  A DNR order is used in case your heart stops beating or you stop breathing. It is a request not to have certain forms of treatment, such as CPR. A DNR order may be included in other types of advance directives. Medical directive: This covers the care that you want if you are in a coma, near death, or unable to make decisions for yourself. You can list the treatments you want for each condition. Treatment may include pain medicine, surgery, blood transfusions, dialysis, IV or tube feedings, and a ventilator (breathing machine). Values history: This document has questions about your views, beliefs, and how you feel and think about life. This information can help others choose the care that you would choose. Why are advance directives important? An advance directive helps you control your care. Although spoken wishes may be used, it is better to have your wishes written down. Spoken wishes can be misunderstood, or not followed. Treatments may be given even if you do not want them.  An advance directive may make it easier for your family to make difficult choices about your care. Fall Prevention    Fall prevention  includes ways to make your home and other areas safer. It also includes ways you can move more carefully to prevent a fall. Health conditions that cause changes in your blood pressure, vision, or muscle strength and coordination may increase your risk for falls. Medicines may also increase your risk for falls if they make you dizzy, weak, or sleepy. Fall prevention tips:   Stand or sit up slowly. Use assistive devices as directed. Wear shoes that fit well and have soles that . Wear a personal alarm. Stay active. Manage your medical conditions. Home Safety Tips:  Add items to prevent falls in the bathroom. Keep paths clear. Install bright lights in your home. Keep items you use often on shelves within reach. Paint or place reflective tape on the edges of your stairs. Urinary Incontinence   Urinary incontinence (UI)  is when you lose control of your bladder. UI develops because your bladder cannot store or empty urine properly. The 3 most common types of UI are stress incontinence, urge incontinence, or both. Medicines:   May be given to help strengthen your bladder control. Report any side effects of medication to your healthcare provider. Do pelvic muscle exercises often:  Your pelvic muscles help you stop urinating. Squeeze these muscles tight for 5 seconds, then relax for 5 seconds. Gradually work up to squeezing for 10 seconds. Do 3 sets of 15 repetitions a day, or as directed. This will help strengthen your pelvic muscles and improve bladder control. Train your bladder:  Go to the bathroom at set times, such as every 2 hours, even if you do not feel the urge to go. You can also try to hold your urine when you feel the urge to go. For example, hold your urine for 5 minutes when you feel the urge to go.  As that becomes easier, hold your urine for 10 minutes. Self-care:   Keep a UI record. Write down how often you leak urine and how much you leak. Make a note of what you were doing when you leaked urine. Drink liquids as directed. You may need to limit the amount of liquid you drink to help control your urine leakage. Do not drink any liquid right before you go to bed. Limit or do not have drinks that contain caffeine or alcohol. Prevent constipation. Eat a variety of high-fiber foods. Good examples are high-fiber cereals, beans, vegetables, and whole-grain breads. Walking is the best way to trigger your intestines to have a bowel movement. Exercise regularly and maintain a healthy weight. Weight loss and exercise will decrease pressure on your bladder and help you control your leakage. Use a catheter as directed  to help empty your bladder. A catheter is a tiny, plastic tube that is put into your bladder to drain your urine. Go to behavior therapy as directed. Behavior therapy may be used to help you learn to control your urge to urinate. Weight Management   Why it is important to manage your weight:  Being overweight increases your risk of health conditions such as heart disease, high blood pressure, type 2 diabetes, and certain types of cancer. It can also increase your risk for osteoarthritis, sleep apnea, and other respiratory problems. Aim for a slow, steady weight loss. Even a small amount of weight loss can lower your risk of health problems. How to lose weight safely:  A safe and healthy way to lose weight is to eat fewer calories and get regular exercise. You can lose up about 1 pound a week by decreasing the number of calories you eat by 500 calories each day. Healthy meal plan for weight management:  A healthy meal plan includes a variety of foods, contains fewer calories, and helps you stay healthy. A healthy meal plan includes the following:  Eat whole-grain foods more often.   A healthy meal plan should contain fiber. Fiber is the part of grains, fruits, and vegetables that is not broken down by your body. Whole-grain foods are healthy and provide extra fiber in your diet. Some examples of whole-grain foods are whole-wheat breads and pastas, oatmeal, brown rice, and bulgur. Eat a variety of vegetables every day. Include dark, leafy greens such as spinach, kale, marlo greens, and mustard greens. Eat yellow and orange vegetables such as carrots, sweet potatoes, and winter squash. Eat a variety of fruits every day. Choose fresh or canned fruit (canned in its own juice or light syrup) instead of juice. Fruit juice has very little or no fiber. Eat low-fat dairy foods. Drink fat-free (skim) milk or 1% milk. Eat fat-free yogurt and low-fat cottage cheese. Try low-fat cheeses such as mozzarella and other reduced-fat cheeses. Choose meat and other protein foods that are low in fat. Choose beans or other legumes such as split peas or lentils. Choose fish, skinless poultry (chicken or turkey), or lean cuts of red meat (beef or pork). Before you cook meat or poultry, cut off any visible fat. Use less fat and oil. Try baking foods instead of frying them. Add less fat, such as margarine, sour cream, regular salad dressing and mayonnaise to foods. Eat fewer high-fat foods. Some examples of high-fat foods include french fries, doughnuts, ice cream, and cakes. Eat fewer sweets. Limit foods and drinks that are high in sugar. This includes candy, cookies, regular soda, and sweetened drinks. Exercise:  Exercise at least 30 minutes per day on most days of the week. Some examples of exercise include walking, biking, dancing, and swimming. You can also fit in more physical activity by taking the stairs instead of the elevator or parking farther away from stores. Ask your healthcare provider about the best exercise plan for you.       © Copyright [employer] 2018 Information is for End User's use only and may not be sold, redistributed or otherwise used for commercial purposes.  All illustrations and images included in CareNotes® are the copyrighted property of [employer] Inc[employer] or [address] [place] [address]

## 2023-10-24 NOTE — PROGRESS NOTES
Assessment and Plan:     Problem List Items Addressed This Visit    None       Preventive health issues were discussed with patient, and age appropriate screening tests were ordered as noted in patient's After Visit Summary. Personalized health advice and appropriate referrals for health education or preventive services given if needed, as noted in patient's After Visit Summary.      History of Present Illness:     Patient presents for a Medicare Wellness Visit    HPI   Patient Care Team:  MABEL Ray as PCP - General (Family Medicine)  Asif Campbell MD as Endoscopist  Kirstin Draper RN as RN Care Manager     Review of Systems:     Review of Systems     Problem List:     Patient Active Problem List   Diagnosis    Acquired hypothyroidism    Primary hypertension    Hyperlipidemia    Reactive airway disease without complication    JANICE (obstructive sleep apnea)    LVH (left ventricular hypertrophy)    Mitral annular calcification    Mitral valve stenosis    Class 3 severe obesity due to excess calories with serious comorbidity and body mass index (BMI) of 45.0 to 49.9 in [facility])    Pulmonary hypertension (HCC)    Chronic diastolic (congestive) heart failure (HCC)    Iron deficiency anemia secondary to inadequate dietary iron intake    Obesity, morbid (HCC)    Gastroesophageal reflux disease without esophagitis    Primary osteoarthritis of left shoulder    AVB (atrioventricular block)    COPD, mild (HCC)    Coronary artery disease involving native coronary artery of native heart without angina pectoris    JANICE on CPAP    S/P TAVR (transcatheter aortic valve replacement)    Depression with anxiety    Insomnia    Osteopenia    Depression, recurrent (HCC)    Radiculopathy, lumbar region    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Stage 3a chronic kidney disease ([address])    Orbital mass    Fall    Acute kidney injury superimposed on CKD     Leukocytosis      Past Medical and Surgical History:     Past Medical History: Diagnosis Date    Anemia 08/22/2018    Anxiety     Arthritis     AVB (atrioventricular block)     first degree    Cataract     CHF (congestive heart failure) (HCC)     COPD, mild (HCC)     Coronary artery disease     Dislocation of right shoulder joint     Frequent UTI     GERD (gastroesophageal reflux disease)     H/O: pneumonia     Heme positive stool     Hyperlipidemia     Hypertension     Hypothyroidism     Morbid obesity with BMI of 50.0-59.9, adult ([address])     Obesity, morbid ([address]) 08/22/2018    JANICE on CPAP     Pulmonary hypertension ([address]) 08/22/2018    Severe aortic stenosis     Simple goiter     Skin cyst     within the armpits, right    Wears glasses      Past Surgical History:   Procedure Laterality Date    BREAST BIOPSY      CARDIAC CATHETERIZATION      CARPAL TUNNEL RELEASE Bilateral     CHOLECYSTECTOMY      DILATION AND CURETTAGE OF UTERUS      HYSTEROSCOPY      MASTOID SURGERY      [place] COLONOSCOPY FLX DX W/COLLJ SPEC WHEN PFRMD N/A 9/6/2018    Procedure: COLONOSCOPY;  Surgeon: Farzana Lugo MD;  Location: [facility]; Service: Gastroenterology    WV ECHO TRANSESOPHAG R-T 2D W/PRB IMG ACQUISJ I&R N/A 10/9/2018    Procedure: INTRA-OP TRANSESOPHAGEAL ECHOCARDIOGRAM (GARRISON); Surgeon: Candelaria Mccloud DO;  [facility]: [facility];  Service: Cardiac Surgery    WV ESOPHAGOGASTRODUODENOSCOPY TRANSORAL DIAGNOSTIC N/A 8/31/2018    Procedure: ESOPHAGOGASTRODUODENOSCOPY (EGD); Surgeon: Farzana Lugo MD;  Location: [facility]; Service: Gastroenterology    WV REPLACE AORTIC VALVE OPENFEMORAL ARTERY APPROACH N/A 10/9/2018    Procedure: REPLACEMENT AORTIC VALVE TRANSCATHETER (TAVR) TRANSFEMORAL W/ 23 MM MENDOZA NOE S3 VALVE (ACCESS OF LEFT);   Surgeon: Candelaria Mccolud DO;  [facility];  Service: Cardiac Surgery    TOTAL HIP ARTHROPLASTY Left 2007    TOTAL KNEE ARTHROPLASTY Bilateral       Family History:     Family History   Problem Relation Age of Onset    Diabetes Mother     Stroke Mother Cancer Father     Lung cancer Father     Diabetes Sister     Heart disease Sister     Hypertension Sister     Coronary artery disease Family     Diabetes Family     Hypertension Family     Cancer Family     Stroke Family     Thyroid disease Neg Hx       Social History:     Social History     Socioeconomic History    Marital status: Single     Spouse name: Not on file    Number of children: Not on file    Years of education: Not on file    Highest education level: Not on file   Occupational History    Occupation: retired   Tobacco Use    Smoking status: Never    Smokeless tobacco: Never   Vaping Use    Vaping Use: Never used   Substance and Sexual Activity    Alcohol use: No    Drug use: No    Sexual activity: Never   Other Topics Concern    Not on file   Social History Narrative    Denied drinking coffee    Denied exercise habits    Most recent tobacco use screenin2018      Do you currently or have you served in the [address] [place] [address] Street:   No      Were you activated, into active duty, as a member of the [employer] or as a Reservist:   No          Social Determinants of Health     Financial Resource Strain: Medium Risk (9/15/2022)    Overall Financial Resource Strain (CARDIA)     Difficulty of Paying Living Expenses: Somewhat hard   Food Insecurity: No Food Insecurity (8/15/2023)    Hunger Vital Sign     Worried About Running Out of Food in the Last Year: Never true     [address] in the Last Year: Never true   Transportation Needs: No Transportation Needs (8/15/2023)    PRAPARE - Transportation     Lack of Transportation (Medical): No     Lack of Transportation (Non-Medical):  No   Physical Activity: Not on file   Stress: Not on file   Social Connections: Not on file   Intimate Partner Violence: Not on file   Housing Stability: Low Risk  (8/15/2023)    Housing Stability Vital Sign     Unable to Pay for Housing in the Last Year: No     Number of Places Lived in the Last Year: 1     Unstable Housing in the Last Year: No      Medications and Allergies:     Current Outpatient Medications   Medication Sig Dispense Refill    acetaminophen (TYLENOL) 500 mg tablet Take 500 mg by mouth every 6 (six) hours as needed      aspirin (ECOTRIN LOW STRENGTH) 81 mg EC tablet Take 1 tablet (81 mg total) by mouth daily 100 tablet 0    b complex vitamins capsule Take 1 capsule by mouth 2 (two) times a day        Calcium Carb-Cholecalciferol (CALCIUM 600 + D PO) Take 1 tablet by mouth 2 (two) times a day      Cranberry 1000 MG CAPS Take 4,200 mg by mouth 2 (two) times a day      Cranberry 250 MG TABS Take by mouth (Patient not taking: Reported on 10/16/2023)      Fesoterodine Fumarate ER (Toviaz) 8 MG TB24 Take 8 mg by mouth daily. Not taking  Indications: Urinary Incontinence      furosemide (LASIX) 20 mg tablet Take 1 tablet (20 mg total) by mouth daily as needed (wt gain) 30 tablet 0    levothyroxine 50 mcg tablet TAKE 1 TABLET BY MOUTH EVERY DAY 90 tablet 1    lidocaine (LIDODERM) 5 % Apply 1 patch topically over 12 hours daily Remove & Discard patch within 12 hours or as directed by MD Do not start before August 19, 2023. (Patient not taking: Reported on 10/2/2023) 15 patch 0    methocarbamol (ROBAXIN) 500 mg tablet Take 1 tablet (500 mg total) by mouth 3 (three) times a day 30 tablet 0    Myrbetriq 50 MG TB24 Take 1 tablet by mouth daily      nystatin (MYCOSTATIN) powder Apply topically 2 (two) times a day (Patient not taking: Reported on 10/2/2023) 60 g 1    olmesartan (BENICAR) 5 mg tablet TAKE 2 TABLETS BY MOUTH EVERY  tablet 3    omeprazole (PriLOSEC) 40 MG capsule TAKE 1 CAPSULE BY MOUTH TWICE A  capsule 1    oxygen gas Inhale 2 L/min continuous. Indications: copd      sertraline (ZOLOFT) 100 mg tablet TAKE 1 TABLET BY MOUTH EVERY DAY 90 tablet 0    simvastatin (ZOCOR) 40 mg tablet TAKE 1 TABLET BY MOUTH EVERYDAY AT BEDTIME 90 tablet 1     No current facility-administered medications for this visit. Allergies   Allergen Reactions    Latex Rash    Neosporin [Neomycin-Bacitracin Zn-Polymyx] Rash and Other (See Comments)     hives per [facility] order      Immunizations:     Immunization History   Administered Date(s) Administered    COVID-19 PFIZER VACCINE 0.3 ML IM 03/16/2021, 04/07/2021    Pneumococcal Conjugate 13-Valent 06/19/2018    Pneumococcal Polysaccharide PPV23 07/01/2019      Health Maintenance: There are no preventive care reminders to display for this patient. Topic Date Due    COVID-19 Vaccine (3 - Pfizer series) 06/02/2021    Influenza Vaccine (1) Never done      Medicare Screening Tests and Risk Assessments:     Lynsey Iqbal is here for her Subsequent Wellness visit. Health Risk Assessment:   Patient rates overall health as good. Patient feels that their physical health rating is same. Patient is satisfied with their life. Eyesight was rated as same. Hearing was rated as same. Patient feels that their emotional and mental health rating is same. Patients states they are never, rarely angry. Patient states they are always unusually tired/fatigued. Pain experienced in the last 7 days has been a lot. Patient's pain rating has been 5/10. Patient states that she has experienced no weight loss or gain in last 6 months. Depression Screening:   PHQ-9 Score: 0      Fall Risk Screening: In the past year, patient has experienced: history of falling in past year    Number of falls: 2 or more  Injured during fall?: No    Feels unsteady when standing or walking?: Yes    Worried about falling?: Yes      Urinary Incontinence Screening:   Patient has leaked urine accidently in the last six months. Home Safety:  Patient has trouble with stairs inside or outside of their home. Patient has working smoke alarms and has no working carbon monoxide detector. Home safety hazards include: none. Nutrition:   Current diet is Regular.      Medications:   Patient is currently taking over-the-counter supplements. OTC medications include: see medication list. Patient is able to manage medications. Activities of Daily Living (ADLs)/Instrumental Activities of Daily Living (IADLs):   Walk and transfer into and out of bed and chair?: Yes  Dress and groom yourself?: Yes    Bathe or shower yourself?: Yes    Feed yourself? Yes  Do your laundry/housekeeping?: Yes  Manage your money, pay your bills and track your expenses?: Yes  Make your own meals?: Yes    Do your own shopping?: Yes    Previous Hospitalizations:   Any hospitalizations or ED visits within the last 12 months?: Yes    How many hospitalizations have you had in the last year?: 1-2    Advance Care Planning:   Living will: No      PREVENTIVE SCREENINGS      Cardiovascular Screening:    General: Screening Not Indicated and History Lipid Disorder      Diabetes Screening:     General: Screening Current      Cervical Cancer Screening:    General: Screening Not Indicated      Lung Cancer Screening:     General: Screening Not Indicated    Screening, Brief Intervention, and Referral to Treatment (SBIRT)    Screening    Typical number of drinks in a week: 0    Single Item Drug Screening:  How often have you used an illegal drug (including marijuana) or a prescription medication for non-medical reasons in the past year? never    Single Item Drug Screen Score: 0  Interpretation: Negative screen for possible drug use disorder    No results found.      Physical Exam:     Ht 4' 7" (1.397 m)   Wt 82.6 kg (182 lb)   BMI 42.30 kg/m²     Physical Exam     MABEL Hallman

## 2023-10-24 NOTE — PROGRESS NOTES
Assessment and Plan:     Problem List Items Addressed This Visit          Digestive    Gastroesophageal reflux disease without esophagitis     Continue omeprazole twice daily. Endocrine    Acquired hypothyroidism     Continue levothyroxine daily. Obtain fasting labs prior to next visit. Respiratory    COPD, mild ([address])       Cardiovascular and Mediastinum    Pulmonary hypertension (HCC) (Chronic)    Primary hypertension     Blood pressure is well controlled. Continue care with cardiology. Continue current medications. Chronic diastolic (congestive) heart failure (HCC)       Musculoskeletal and Integument    Primary osteoarthritis of left shoulder     Patient continues with severe shoulder pain. Continue care with orthopedics and pain management. Genitourinary    Stage 3a chronic kidney disease ([address])     Lab Results   Component Value Date    EGFR 62 10/10/2023    EGFR 29 08/18/2023    EGFR 36 08/17/2023    CREATININE 0.86 10/10/2023    CREATININE 1.62 (H) 08/18/2023    CREATININE 1.33 (H) 08/17/2023   Kidney function is much improved since her hospital stay. Advised to increase hydration. Avoid NSAIDs and limit furosemide if possible. Other    Obesity, morbid ([address]) (Chronic)    S/P TAVR (transcatheter aortic valve replacement)    Depression, recurrent (HCC)     Doing okay with sertraline daily. Ambulatory dysfunction    Relevant Orders    Motorized Scooter     Other Visit Diagnoses       Medicare annual wellness visit, subsequent    -  Primary    Healthcare maintenance        Relevant Orders    CBC and differential    Comprehensive metabolic panel    Lipid panel    TSH, 3rd generation with Free T4 reflex          BMI Counseling: Body mass index is 42.3 kg/m².  The BMI is above normal. Nutrition recommendations include decreasing portion sizes, encouraging healthy choices of fruits and vegetables, limiting drinks that contain sugar and moderation in carbohydrate intake. Exercise recommendations include moderate physical activity 150 minutes/week and exercising 3-5 times per week. Rationale for BMI follow-up plan is due to patient being overweight or obese. Preventive health issues were discussed with patient, and age appropriate screening tests were ordered as noted in patient's After Visit Summary. Personalized health advice and appropriate referrals for health education or preventive services given if needed, as noted in patient's After Visit Summary. History of Present Illness:     Patient presents for a Medicare Wellness Visit    Patient presents for AWV with her friend . She continues with pain in both shoulders. She is going to ortho and pain management. She did have a shoulder injection with little to no improvement. Patient Care Team:  Cassie Bradley as PCP - General (Family Medicine)  Marimar Zavala MD as Endoscopist  Sandy Hopkins RN as RN Care Manager     Review of Systems:     Review of Systems   Constitutional:  Negative for chills, diaphoresis and fever. HENT:  Negative for ear pain and sore throat. Eyes:  Negative for pain and visual disturbance. Respiratory:  Negative for cough and shortness of breath. Cardiovascular:  Negative for chest pain and palpitations. Gastrointestinal:  Negative for abdominal pain and vomiting. Genitourinary:  Negative for dysuria and hematuria. Musculoskeletal:  Positive for arthralgias. Negative for back pain. Neurological:  Negative for dizziness, light-headedness and headaches. Psychiatric/Behavioral:  Positive for dysphoric mood and sleep disturbance. The patient is not nervous/anxious. All other systems reviewed and are negative.        Problem List:     Patient Active Problem List   Diagnosis    Acquired hypothyroidism    Primary hypertension    Hyperlipidemia    JANICE (obstructive sleep apnea)    LVH (left ventricular hypertrophy)    Mitral annular calcification    Mitral valve stenosis    Pulmonary hypertension (HCC)    Chronic diastolic (congestive) heart failure (HCC)    Iron deficiency anemia secondary to inadequate dietary iron intake    Obesity, morbid (HCC)    Gastroesophageal reflux disease without esophagitis    Primary osteoarthritis of left shoulder    AVB (atrioventricular block)    COPD, mild (HCC)    Coronary artery disease involving native coronary artery of native heart without angina pectoris    S/P TAVR (transcatheter aortic valve replacement)    Depression with anxiety    Insomnia    Osteopenia    Depression, recurrent (HCC)    Radiculopathy, lumbar region    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Stage 3a chronic kidney disease ([address])    Orbital mass    Fall    Ambulatory dysfunction      Past Medical and Surgical History:     Past Medical History:   Diagnosis Date    Anemia 08/22/2018    Anxiety     Arthritis     AVB (atrioventricular block)     first degree    Cataract     CHF (congestive heart failure) (HCC)     COPD, mild (HCC)     Coronary artery disease     Dislocation of right shoulder joint     Frequent UTI     GERD (gastroesophageal reflux disease)     H/O: pneumonia     Heme positive stool     Hyperlipidemia     Hypertension     Hypothyroidism     Morbid obesity with BMI of 50.0-59.9, adult ([address])     Obesity, morbid ([address]) 08/22/2018    JANICE on CPAP     Pulmonary hypertension ([address]) 08/22/2018    Severe aortic stenosis     Simple goiter     Skin cyst     within the armpits, right    Wears glasses      Past Surgical History:   Procedure Laterality Date    BREAST BIOPSY      CARDIAC CATHETERIZATION      CARPAL TUNNEL RELEASE Bilateral     CHOLECYSTECTOMY      DILATION AND CURETTAGE OF UTERUS      HYSTEROSCOPY      MASTOID SURGERY      TN COLONOSCOPY FLX DX W/COLLJ SPEC WHEN PFRMD N/A 9/6/2018    Procedure: COLONOSCOPY;  Surgeon: Kip Gore MD;  Location: [facility];   Service: Gastroenterology    TN ECHO TRANSESOPHAG R-T 2D W/PRB IMG NOLAN I&R N/A 10/9/2018    Procedure: INTRA-OP TRANSESOPHAGEAL ECHOCARDIOGRAM (GARRISON); Surgeon: Jazzmine Goode DO;  Location: [facility];  Service: Cardiac Surgery    MI ESOPHAGOGASTRODUODENOSCOPY TRANSORAL DIAGNOSTIC N/A 2018    Procedure: ESOPHAGOGASTRODUODENOSCOPY (EGD); Surgeon: Eileen Drummond MD;  Location: [facility]; Service: Gastroenterology    MI REPLACE AORTIC VALVE OPENFEMORAL ARTERY APPROACH N/A 10/9/2018    Procedure: REPLACEMENT AORTIC VALVE TRANSCATHETER (TAVR) TRANSFEMORAL W/ 23 MM MENDOZA NOE S3 VALVE (ACCESS OF LEFT);   Surgeon: Jazzmine Goode DO;  Location: [facility];  Service: Cardiac Surgery    TOTAL HIP ARTHROPLASTY Left 2007    TOTAL KNEE ARTHROPLASTY Bilateral       Family History:     Family History   Problem Relation Age of Onset    Diabetes Mother     Stroke Mother     Cancer Father     Lung cancer Father     Diabetes Sister     Heart disease Sister     Hypertension Sister     Coronary artery disease Family     Diabetes Family     Hypertension Family     Cancer Family     Stroke Family     Thyroid disease Neg Hx       Social History:     Social History     Socioeconomic History    Marital status: Single     Spouse name: None    Number of children: None    Years of education: None    Highest education level: None   Occupational History    Occupation: retired   Tobacco Use    Smoking status: Never    Smokeless tobacco: Never   Vaping Use    Vaping Use: Never used   Substance and Sexual Activity    Alcohol use: No    Drug use: No    Sexual activity: Never   Other Topics Concern    None   Social History Narrative    Denied drinking coffee    Denied exercise habits    Most recent tobacco use screenin2018      Do you currently or have you served in the [address] [place] [address]:   No      Were you activated, into active duty, as a member of the [employer] or as a Reservist:   No          Social Determinants of Health     Financial Resource Strain: Low Risk (10/24/2023)    Overall Financial Resource Strain (CARDIA)     Difficulty of Paying Living Expenses: Not hard at all   Food Insecurity: No Food Insecurity (8/15/2023)    Hunger Vital Sign     Worried About Running Out of Food in the Last Year: Never true     Ran Out of Food in the Last Year: Never true   Transportation Needs: No Transportation Needs (10/24/2023)    PRAPARE - Transportation     Lack of Transportation (Medical): No     Lack of Transportation (Non-Medical): No   Physical Activity: Not on file   Stress: Not on file   Social Connections: Not on file   Intimate Partner Violence: Not on file   Housing Stability: Low Risk  (8/15/2023)    Housing Stability Vital Sign     Unable to Pay for Housing in the Last Year: No     Number of Places Lived in the Last Year: 1     Unstable Housing in the Last Year: No      Medications and Allergies:     Current Outpatient Medications   Medication Sig Dispense Refill    acetaminophen (TYLENOL) 500 mg tablet Take 500 mg by mouth every 6 (six) hours as needed      aspirin (ECOTRIN LOW STRENGTH) 81 mg EC tablet Take 1 tablet (81 mg total) by mouth daily 100 tablet 0    b complex vitamins capsule Take 1 capsule by mouth 2 (two) times a day        Calcium Carb-Cholecalciferol (CALCIUM 600 + D PO) Take 1 tablet by mouth 2 (two) times a day      Fesoterodine Fumarate ER (Toviaz) 8 MG TB24 Take 8 mg by mouth daily.  Not taking  Indications: Urinary Incontinence      furosemide (LASIX) 20 mg tablet Take 1 tablet (20 mg total) by mouth daily as needed (wt gain) 30 tablet 0    levothyroxine 50 mcg tablet TAKE 1 TABLET BY MOUTH EVERY DAY 90 tablet 1    methocarbamol (ROBAXIN) 500 mg tablet Take 1 tablet (500 mg total) by mouth 3 (three) times a day 30 tablet 0    Myrbetriq 50 MG TB24 Take 1 tablet by mouth daily      olmesartan (BENICAR) 5 mg tablet TAKE 2 TABLETS BY MOUTH EVERY  tablet 3    omeprazole (PriLOSEC) 40 MG capsule TAKE 1 CAPSULE BY MOUTH TWICE A  capsule 1 oxygen gas Inhale 2 L/min continuous. Indications: copd      sertraline (ZOLOFT) 100 mg tablet TAKE 1 TABLET BY MOUTH EVERY DAY 90 tablet 0    simvastatin (ZOCOR) 40 mg tablet TAKE 1 TABLET BY MOUTH EVERYDAY AT BEDTIME 90 tablet 1    Cranberry 250 MG TABS Take by mouth (Patient not taking: Reported on 10/16/2023)       No current facility-administered medications for this visit. Allergies   Allergen Reactions    Latex Rash    Neosporin [Neomycin-Bacitracin Zn-Polymyx] Rash and Other (See Comments)     hives per [facility] order      Immunizations:     Immunization History   Administered Date(s) Administered    COVID-19 PFIZER VACCINE 0.3 ML IM 03/16/2021, 04/07/2021    Pneumococcal Conjugate 13-Valent 06/19/2018    Pneumococcal Polysaccharide PPV23 07/01/2019      Health Maintenance: There are no preventive care reminders to display for this patient. Topic Date Due    COVID-19 Vaccine (3 - Pfizer series) 06/02/2021    Influenza Vaccine (1) Never done      Medicare Screening Tests and Risk Assessments:         Depression Screening:   PHQ-9 Score: 0      Advance Care Planning:   Living will: No    Durable POA for healthcare: Yes      PREVENTIVE SCREENINGS      Cardiovascular Screening:    General: Screening Not Indicated and History Lipid Disorder      Diabetes Screening:     General: Screening Current      Colorectal Cancer Screening:     General: Screening Not Indicated      Breast Cancer Screening:     General: Screening Not Indicated      Cervical Cancer Screening:    General: Screening Not Indicated      Osteoporosis Screening:    General: Screening Not Indicated      Abdominal Aortic Aneurysm (AAA) Screening:        General: Screening Not Indicated      Lung Cancer Screening:     General: Screening Not Indicated      Hepatitis C Screening:    General: Screening Not Indicated    No results found.      Physical Exam:     /80   Pulse (!) 42   Ht 4' 7" (1.397 m)   Wt 82.6 kg (182 lb)   SpO2 95% BMI 42.30 kg/m²     Physical Exam  Constitutional:       Appearance: Normal appearance. She is obese. Cardiovascular:      Rate and Rhythm: Normal rate and regular rhythm. Pulses: Normal pulses. Heart sounds: No murmur heard. Pulmonary:      Effort: Pulmonary effort is normal. No respiratory distress. Neurological:      Mental Status: She is alert and oriented to person, place, and time.           [id number] [facility]

## 2023-10-30 ENCOUNTER — PATIENT OUTREACH (OUTPATIENT)
Dept: CASE MANAGEMENT | Facility: OTHER | Age: 83
End: 2023-10-30

## 2023-10-30 NOTE — PROGRESS NOTES
I called and there was no answer or voicemail. I was unable to engage patient in care management. She was seen by her PCP on 10/24 and her recent BMP shows much improvement per physician.   I am removing myself from the care team.

## 2023-11-03 ENCOUNTER — OFFICE VISIT (OUTPATIENT)
Dept: CARDIOLOGY CLINIC | Facility: CLINIC | Age: 83
End: 2023-11-03
Payer: MEDICARE

## 2023-11-03 VITALS
OXYGEN SATURATION: 98 % | HEART RATE: 61 BPM | WEIGHT: 182 LBS | SYSTOLIC BLOOD PRESSURE: 118 MMHG | HEIGHT: 55 IN | DIASTOLIC BLOOD PRESSURE: 78 MMHG | BODY MASS INDEX: 42.12 KG/M2 | RESPIRATION RATE: 16 BRPM

## 2023-11-03 DIAGNOSIS — E66.01 MORBID OBESITY (HCC): ICD-10-CM

## 2023-11-03 DIAGNOSIS — G47.33 OSA (OBSTRUCTIVE SLEEP APNEA): ICD-10-CM

## 2023-11-03 DIAGNOSIS — Z95.2 S/P TAVR (TRANSCATHETER AORTIC VALVE REPLACEMENT): ICD-10-CM

## 2023-11-03 DIAGNOSIS — I27.20 PULMONARY HYPERTENSION (HCC): ICD-10-CM

## 2023-11-03 DIAGNOSIS — I51.7 LVH (LEFT VENTRICULAR HYPERTROPHY): ICD-10-CM

## 2023-11-03 DIAGNOSIS — I34.2 NON-RHEUMATIC MITRAL VALVE STENOSIS: ICD-10-CM

## 2023-11-03 DIAGNOSIS — I35.0 SEVERE AORTIC STENOSIS: Primary | ICD-10-CM

## 2023-11-03 DIAGNOSIS — E78.2 MIXED HYPERLIPIDEMIA: ICD-10-CM

## 2023-11-03 DIAGNOSIS — I50.32 CHRONIC DIASTOLIC (CONGESTIVE) HEART FAILURE (HCC): ICD-10-CM

## 2023-11-03 DIAGNOSIS — I10 ESSENTIAL HYPERTENSION: ICD-10-CM

## 2023-11-03 PROCEDURE — 99214 OFFICE O/P EST MOD 30 MIN: CPT | Performed by: INTERNAL MEDICINE

## 2023-11-03 NOTE — PROGRESS NOTES
CARDIOLOGY OFFICE VISIT  [facility]  [address] [facility], [facility] [address]  [address] [place] [address], [facility]  Tel: [phone]      NAME: Marcellus Pugh  AGE: 79 y.o. SEX: female  : 1940  MRN: [id number]      Chief Complaint:  Chief Complaint   Patient presents with    Follow-up         History of Present Illness:   Lorraine Child comes for follow-up. States both her shoulders hurt but from heart standpoint she denies chest pain, shortness of breath, lightheadedness, palpitations, syncope, swelling feet, orthopnea, PND, claudication. States she has not had to use her diuretic for a long time now. Severe aortic stenosis S/P TAVR on 10/9/18 -  On ASA. Patient is aware of need for antibiotic prophylaxis prior to dental work     Chronic diastolic congestive heart failure -  Currently euvolemic. On furosemide prn only, Olmesartan. Beta-blocker was held due to persistent SOB. States she watches her salt intake very closely    Essential hypertension, LVH -  Has been hypertensive for many years. Taking medications regularly. Denies lightheadedness, headache, medication side effects. Mixed hyperlipidemia -  Has had hyperlipidemia for many years. Taking statin regularly along with diet control. Denies myalgia. PCP closely monitoring the blood work. Mitral stenosis -  Stable. Follow-up with serial echocardiograms     PHTN - from JANICE, valvular disease     Morbid obesity -  Has lost about 30 lbs weight over the last 7 m     JANICE - now uses CPAP.  She does have history of pulmonary hypertension       Past Medical History:  Past Medical History:   Diagnosis Date    Anemia 2018    Anxiety     Arthritis     AVB (atrioventricular block)     first degree    Cataract     CHF (congestive heart failure) (HCC)     COPD, mild (HCC)     Coronary artery disease     Dislocation of right shoulder joint     Frequent UTI     GERD (gastroesophageal reflux disease)     H/O: pneumonia     Heme positive stool     Hyperlipidemia     Hypertension     Hypothyroidism     Morbid obesity with BMI of 50.0-59.9, adult ([address])     Obesity, morbid ([address]) 08/22/2018    JANICE on CPAP     Pulmonary hypertension ([address]) 08/22/2018    Severe aortic stenosis     Simple goiter     Skin cyst     within the armpits, right    Wears glasses          Past Surgical History:  Past Surgical History:   Procedure Laterality Date    BREAST BIOPSY      CARDIAC CATHETERIZATION      CARPAL TUNNEL RELEASE Bilateral     CHOLECYSTECTOMY      DILATION AND CURETTAGE OF UTERUS      HYSTEROSCOPY      MASTOID SURGERY      MN COLONOSCOPY FLX DX W/COLLJ SPEC WHEN PFRMD N/A 9/6/2018    Procedure: COLONOSCOPY;  Surgeon: Antonio Meadows MD;  Location: [facility]; Service: Gastroenterology    MN ECHO TRANSESOPHAG R-T 2D W/PRB IMG ACQUISJ I&R N/A 10/9/2018    Procedure: INTRA-OP TRANSESOPHAGEAL ECHOCARDIOGRAM (GARRISON); Surgeon: Neha Burns DO;  Location: [facility];  Service: Cardiac Surgery    MN ESOPHAGOGASTRODUODENOSCOPY TRANSORAL DIAGNOSTIC N/A 8/31/2018    Procedure: ESOPHAGOGASTRODUODENOSCOPY (EGD); Surgeon: Antonio Meadows MD;  [facility]: [facility]; Service: Gastroenterology    MN REPLACE AORTIC VALVE OPENFEMORAL ARTERY APPROACH N/A 10/9/2018    Procedure: REPLACEMENT AORTIC VALVE TRANSCATHETER (TAVR) TRANSFEMORAL W/ 23 MM MENDOZA NOE S3 VALVE (ACCESS OF LEFT);   Surgeon: Neha Burns DO;  [facility];  Service: Cardiac Surgery    TOTAL HIP ARTHROPLASTY Left 2007    TOTAL KNEE ARTHROPLASTY Bilateral          Family History:  Family History   Problem Relation Age of Onset    Diabetes Mother     Stroke Mother     Cancer Father     Lung cancer Father     Diabetes Sister     Heart disease Sister     Hypertension Sister     Coronary artery disease Family     Diabetes Family     Hypertension Family     Cancer Family     Stroke Family     Thyroid disease Neg Hx          Social History:  Social History Socioeconomic History    Marital status: Single     Spouse name: None    Number of children: None    Years of education: None    Highest education level: None   Occupational History    Occupation: retired   Tobacco Use    Smoking status: Never    Smokeless tobacco: Never   Vaping Use    Vaping Use: Never used   Substance and Sexual Activity    Alcohol use: No    Drug use: No    Sexual activity: Never   Other Topics Concern    None   Social History Narrative    Denied drinking coffee    Denied exercise habits    Most recent tobacco use screenin2018      Do you currently or have you served in the [address] [place] [address] Street:   No      Were you activated, into active duty, as a member of the [employer] or as a Reservist:   No          Social Determinants of Health     Financial Resource Strain: [address]  (10/24/2023)    Overall Financial Resource Strain (CARDIA)     Difficulty of Paying Living Expenses: Not hard at all   Food Insecurity: No Food Insecurity (8/15/2023)    Hunger Vital Sign     Worried About Running Out of Food in the Last Year: Never true     [address] in the Last Year: Never true   Transportation Needs: No Transportation Needs (10/24/2023)    PRAPARE - Transportation     Lack of Transportation (Medical): No     Lack of Transportation (Non-Medical):  No   Physical Activity: Not on file   Stress: Not on file   Social Connections: Not on file   Intimate Partner Violence: Not on file   Housing Stability: Low Risk  (8/15/2023)    Housing Stability Vital Sign     Unable to Pay for Housing in the Last Year: No     Number of Places Lived in the Last Year: 1     Unstable Housing in the Last Year: No         Active Problems:  Patient Active Problem List   Diagnosis    Acquired hypothyroidism    Primary hypertension    Hyperlipidemia    JANICE (obstructive sleep apnea)    LVH (left ventricular hypertrophy)    Mitral annular calcification    Mitral valve stenosis    Pulmonary hypertension (HCC)    Chronic diastolic (congestive) heart failure (HCC)    Iron deficiency anemia secondary to inadequate dietary iron intake    Obesity, morbid (HCC)    Gastroesophageal reflux disease without esophagitis    Primary osteoarthritis of left shoulder    AVB (atrioventricular block)    COPD, mild (HCC)    Coronary artery disease involving native coronary artery of native heart without angina pectoris    S/P TAVR (transcatheter aortic valve replacement)    Depression with anxiety    Insomnia    Osteopenia    Depression, recurrent (HCC)    Radiculopathy, lumbar region    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Stage 3a chronic kidney disease ([address])    Orbital mass    Fall    Ambulatory dysfunction         The following portions of the patient's history were reviewed and updated as appropriate: past medical history, past surgical history, past family history,  past social history, current medications, allergies and problem list.      Review of Systems:  10 system review of systems was essentially negative other than what is mentioned in the HPI above      Vitals:  Vitals:    11/03/23 1147   BP: 118/78   Pulse: 61   Resp: 16   SpO2: 98%       Body mass index is 42.3 kg/m². Weight (last 2 days)       Date/Time Weight    11/03/23 1147 82.6 (182)            Physical Examination  General: Awake, alert. Responding to commands. Using a walker for support. Morbidly obese  Head: Normocephalic. Atraumatic  Eyes: Nonicteric  Neck: Supple. JVP not raised. Trachea central. No thyromegaly  Lungs: Bilateral bronchovascular breath sounds with no crackles or rhonchi  Chest wall: No tenderness  Cardiovascular: RRR. S1 and S2 normal. No murmur, rub or gallop  Gastrointestinal: Abdomen soft, nontender. No guarding or rigidity. Liver and spleen not palpable. Bowel sounds present  Neurologic: Patient is awake, alert. Responding to command. Moving all extremities  Integumentary:  No skin rash  Lymphatic: No cervical lymphadenopathy  Back: Symmetric.  No CVA tenderness  Extremities: No clubbing, cyanosis or edema        Laboratory Results:  CBC with diff:   Lab Results   Component Value Date    WBC 8.82 10/10/2023    RBC 4.06 10/10/2023    HGB 11.0 (L) 10/10/2023    HCT 36.2 10/10/2023    MCV 89 10/10/2023    MCH 27.1 10/10/2023    RDW 17.0 (H) 10/10/2023     (H) 10/10/2023       CMP:  Lab Results   Component Value Date    CREATININE 0.86 10/10/2023    BUN 33 (H) 10/10/2023    K 4.5 10/10/2023     10/10/2023    CO2 23 10/10/2023    CO2 32 10/09/2018    GLUCOSE 101 10/09/2018    ALKPHOS 212 (H) 08/17/2023    ALT 51 08/17/2023    AST 39 08/17/2023       Lab Results   Component Value Date    HGBA1C 6.1 (H) 08/18/2023    MG 2.0 08/17/2023       Lab Results   Component Value Date    TROPONINI <0.02 08/14/2018    TROPONINI <0.02 08/14/2018    TROPONINI <0.02 08/13/2018    CKTOTAL 540 (H) 08/14/2023       Cardiac testing:   Results for orders placed during the hospital encounter of 06/22/22    Echo complete w/ contrast if indicated    Interpretation Summary    Left Ventricle: Left ventricular cavity size is normal. Wall thickness is mildly increased. There is mild concentric hypertrophy. Systolic function is normal - 60%. Wall motion is normal. Diastolic function is mildly abnormal, consistent with grade I (abnormal) relaxation. Right Ventricle: Right ventricular cavity size is normal. Systolic function is normal.    Aortic Valve: There is an Cornelius NOE 3 23 mm TAVR bioprosthetic valve. Mean pressure gradient is 19 mm Hg. Mitral Valve: There is severe annular calcification. There is moderate stenosis. Tricuspid Valve: There is mild regurgitation. The right ventricular systolic pressure is mildly to moderately elevated (55 mm Hg).       Results for orders placed during the hospital encounter of 11/01/17    NM myocardial perfusion spect (rx stress and/or rest)    [address] [address]  [phone]    Rest/Stress Gated SPECT Myocardial Perfusion Imaging After Regadenoson    Patient: Alisa Nayak  MR number: [id number]  Account number: [de-identified]  : 1940  Age: 67 years  Gender: Female  Status: Outpatient  [facility]: [facility]  Height: 53 in  Weight: 214 lb  BP: 156/ 96 mmHg    Allergies: LATEX, NEOMYCIN-BACITRACIN ZN-POLYMYX    Diagnosis: R07.9 - Chest pain, unspecified    Primary Physician:  Taniya Leiva MD  Interpreting Physician:  Janna Martinez MD  Referring Physician:  Janna Martinez MD  Technician:  Gabriel Tyler BS, BA, AART(N)  Group:  [facility]  Other:  Ollie Su MS, CCT    INDICATIONS: Detection of coronary artery disease. HISTORY: The patient is a 67year old  female. Chest pain status: chest pain. Coronary artery disease risk factors: dyslipidemia, family history of premature coronary artery disease, and post-menopausal state. Cardiovascular  history: aortic valve disease. Co-morbidity: obesity. Medications: aspirin, a lipid lowering agent, and thyroid medications. REST ECG: Normal sinus rhythm. Poor R wave progression in precordial leads. PROCEDURE: The study was performed at [facility]. A regadenoson infusion pharmacologic stress test was performed. Gated SPECT myocardial perfusion imaging was performed after stress and at rest. Systolic blood pressure  was 156 mmHg, at the start of the study. Diastolic blood pressure was 96 mmHg, at the start of the study. The heart rate was 75 bpm, at the start of the study.  Patient was not experiencing chest pain at the time of the injection of the  radiopharmaceutical.  Regadenoson protocol:  HR bpm SBP mmHg DBP mmHg Symptoms ST change Rhythm/conduct  Baseline 75 156 96 none none NSR, no ectopy, rare PVC's  Immediate 91 138 78 -- -- occasional PVC's  1 min 89 -- -- headache -- occasional PVC's  2 min 87 132 78 -- -- --  No medications or fluids given. IV aminophylline was administered. STRESS SUMMARY: Duration of pharmacologic stress was 3 min. Maximal heart rate during stress was 91 bpm. The heart rate response to stress was normal. There was normal resting blood pressure with an appropriate response to stress. The  rate-pressure product for the peak heart rate and blood pressure was 10949. There was no chest pain during stress. The stress test was terminated due to protocol completion. The stress ECG was negative for ischemia. Arrhythmia during  stress: isolated premature ventricular beats. ISOTOPE ADMINISTRATION:  Resting isotope administration Stress isotope administration  Agent Sestamibi Sestamibi  Dose 15.62 mCi 47.1 mCi  Date 11/01/2017 11/01/2017  Injection-image interval 30 min 45 min    The radiopharmaceutical was injected at the peak effect of pharmacologic stress. MYOCARDIAL PERFUSION IMAGING:  The image quality was good. Left ventricular size was normal.    PERFUSION DEFECTS:  -  There were no perfusion defects. GATED SPECT:  The calculated left ventricular ejection fraction was 61 %. Left ventricular ejection fraction was within normal limits by visual estimate. There was no diagnostic evidence for left ventricular regional abnormality. SUMMARY:  -  Stress results: There was no chest pain during stress. -  ECG conclusions: The stress ECG was negative for ischemia. Arrhythmia during stress: isolated premature ventricular beats. -  Perfusion imaging: There were no perfusion defects.  -  Gated SPECT: The calculated left ventricular ejection fraction was 61 %. Left ventricular ejection fraction was within normal limits by visual estimate. There was no diagnostic evidence for left ventricular regional abnormality. IMPRESSIONS: Normal study after pharmacologic stress. Myocardial perfusion imaging was normal at rest and with stress.  Left ventricular systolic function was normal.    Prepared and signed by    Ken Keith MD  Signed 11/01/2017 17:31:28      Medications:    Current Outpatient Medications:     acetaminophen (TYLENOL) 500 mg tablet, Take 500 mg by mouth every 6 (six) hours as needed, Disp: , Rfl:     aspirin (ECOTRIN LOW STRENGTH) 81 mg EC tablet, Take 1 tablet (81 mg total) by mouth daily, Disp: 100 tablet, Rfl: 0    b complex vitamins capsule, Take 1 capsule by mouth 2 (two) times a day  , Disp: , Rfl:     Calcium Carb-Cholecalciferol (CALCIUM 600 + D PO), Take 1 tablet by mouth 2 (two) times a day, Disp: , Rfl:     Cranberry 250 MG TABS, Take by mouth, Disp: , Rfl:     Fesoterodine Fumarate ER (Toviaz) 8 MG TB24, Take 8 mg by mouth daily. Not taking  Indications: Urinary Incontinence, Disp: , Rfl:     furosemide (LASIX) 20 mg tablet, Take 1 tablet (20 mg total) by mouth daily as needed (wt gain), Disp: 30 tablet, Rfl: 0    levothyroxine 50 mcg tablet, TAKE 1 TABLET BY MOUTH EVERY DAY, Disp: 90 tablet, Rfl: 1    methocarbamol (ROBAXIN) 500 mg tablet, Take 1 tablet (500 mg total) by mouth 3 (three) times a day, Disp: 30 tablet, Rfl: 0    Myrbetriq 50 MG TB24, Take 1 tablet by mouth daily, Disp: , Rfl:     olmesartan (BENICAR) 5 mg tablet, TAKE 2 TABLETS BY MOUTH EVERY DAY, Disp: 180 tablet, Rfl: 3    omeprazole (PriLOSEC) 40 MG capsule, TAKE 1 CAPSULE BY MOUTH TWICE A DAY, Disp: 180 capsule, Rfl: 1    oxygen gas, Inhale 2 L/min continuous. Indications: copd, Disp: , Rfl:     sertraline (ZOLOFT) 100 mg tablet, TAKE 1 TABLET BY MOUTH EVERY DAY, Disp: 90 tablet, Rfl: 0    simvastatin (ZOCOR) 40 mg tablet, TAKE 1 TABLET BY MOUTH EVERYDAY AT BEDTIME, Disp: 90 tablet, Rfl: 1      Allergies: Allergies   Allergen Reactions    Latex Rash    Neosporin [Neomycin-Bacitracin Zn-Polymyx] Rash and Other (See Comments)     hives per [facility] order         Assessment and Plan:  1. Severe aortic stenosis S/P TAVR (transcatheter aortic valve replacement)  Continue aspirin.   Patient is aware of need for antibiotic prophylaxis prior to dental work. 2. Chronic diastolic (congestive) heart failure (HCC)  Euvolemic. On furosemide as needed. Continue olmesartan. Her beta-blocker was discontinued due to her persistent shortness of breath. Low-salt diet. Daily weight    3. Essential hypertension. LVH (left ventricular hypertrophy)  BP normal.  Continue current medications. Continue to monitor BP at home and call if abnormal    4. Mixed hyperlipidemia  Continue statin and diet control. Her PCP closely monitors her blood work    5. Non-rheumatic mitral valve stenosis  Being followed with serial echocardiograms at recommended intervals    6. Pulmonary hypertension ([address])  Follow-up with pulmonologist    7. Morbid obesity ([address])  Continue to lose weight until you reach goal BMI    8. JANICE (obstructive sleep apnea)  Continue to use CPAP regularly    Recommend aggressive risk factor modification and therapeutic lifestyle changes. Low-salt, low-calorie, low-fat, low-cholesterol diet with regular exercise and to optimize weight. I will defer the ordering and monitoring of necessity lab studies to you, but I am available and happy to review and manage any of the data at your request in the future. Discussed concepts of atherosclerosis, including signs and symptoms of cardiac disease. Previous studies were reviewed. Safety measures were reviewed. Questions were entertained and answered. Patient was advised to report any problems requiring medical attention. Follow-up with PCP and appropriate specialist and lab work as discussed. Return for follow up visit as scheduled or earlier, if needed. Thank you for allowing me to participate in the care and evaluation of your patient. Should you have any questions, please feel free to contact me.       Coco Albright MD  11/3/2023,12:21 PM

## 2023-11-06 ENCOUNTER — TELEPHONE (OUTPATIENT)
Dept: FAMILY MEDICINE CLINIC | Facility: CLINIC | Age: 83
End: 2023-11-06

## 2023-11-06 DIAGNOSIS — Z95.2 S/P TAVR (TRANSCATHETER AORTIC VALVE REPLACEMENT): Primary | ICD-10-CM

## 2023-11-06 RX ORDER — AMOXICILLIN 500 MG/1
CAPSULE ORAL
Qty: 4 CAPSULE | Refills: 0 | Status: SHIPPED | OUTPATIENT
Start: 2023-11-06 | End: 2023-11-06

## 2023-11-06 NOTE — TELEPHONE ENCOUNTER
Patient called stating she is having dental procedure done Wednesday 11/8. Dentist said she must have antibiotics prior to procedure.   [facility]

## 2023-12-01 ENCOUNTER — TELEPHONE (OUTPATIENT)
Age: 83
End: 2023-12-01

## 2023-12-01 NOTE — TELEPHONE ENCOUNTER
Please schedule repeat left and right shoulder injection, 3 months from last injections on 10/2 and 10/16

## 2023-12-01 NOTE — TELEPHONE ENCOUNTER
Caller: Sushma Lares pt    Doctor: Dannielle Greenfield    Reason for call: pt is calling to see if she can get shoulder injections in both shoulder's.   Current pain is 5/10    Call back#: [phone]

## 2023-12-01 NOTE — TELEPHONE ENCOUNTER
S/w pt. S/p 10/2 and 10/16 shoulder injections. Pt requesting to repeat procedures. Advised pt too soon to repeat. Pt states severe pain has improved but still having a lot of pain with lifting arms and activity. Pt states she has methocarbamol on hand and has started taking turmeric and she thinks it is helping some. Ok to schedule shoulder injections at 3 month giovanny or should pt schedule ov?

## 2023-12-04 NOTE — TELEPHONE ENCOUNTER
Caller: Maria Elena Akins    Doctor: dr Amanda Dudley    Reason for call: pt returning nurses phone call    Call back#: [phone]

## 2023-12-04 NOTE — TELEPHONE ENCOUNTER
Caller: Alia Dill     Doctor: Dr Erin Mcghee     Reason for call: Patient returning call from office please advise     Call back#: [phone]

## 2023-12-04 NOTE — TELEPHONE ENCOUNTER
Caller: Patient    Doctor/Office: [facility]    Call regarding :  Returning call from previous message.      Call was transferred to: [facility]

## 2023-12-07 ENCOUNTER — TELEPHONE (OUTPATIENT)
Dept: FAMILY MEDICINE CLINIC | Facility: CLINIC | Age: 83
End: 2023-12-07

## 2023-12-07 NOTE — TELEPHONE ENCOUNTER
She called in stating that medicare told her they were waiting for us to send more information as to why she needs a motorized scooter

## 2023-12-27 ENCOUNTER — TELEPHONE (OUTPATIENT)
Dept: PAIN MEDICINE | Facility: CLINIC | Age: 83
End: 2023-12-27

## 2024-01-03 ENCOUNTER — PROCEDURE VISIT (OUTPATIENT)
Dept: PAIN MEDICINE | Facility: CLINIC | Age: 84
End: 2024-01-03
Payer: MEDICARE

## 2024-01-03 VITALS
WEIGHT: 182 LBS | HEART RATE: 42 BPM | BODY MASS INDEX: 42.12 KG/M2 | SYSTOLIC BLOOD PRESSURE: 148 MMHG | DIASTOLIC BLOOD PRESSURE: 67 MMHG | HEIGHT: 55 IN

## 2024-01-03 DIAGNOSIS — M19.012 PRIMARY OSTEOARTHRITIS OF LEFT SHOULDER: Primary | ICD-10-CM

## 2024-01-03 PROCEDURE — 20611 DRAIN/INJ JOINT/BURSA W/US: CPT | Performed by: STUDENT IN AN ORGANIZED HEALTH CARE EDUCATION/TRAINING PROGRAM

## 2024-01-03 RX ORDER — VIT C/B6/B5/MAGNESIUM/HERB 173 50-5-6-5MG
CAPSULE ORAL
COMMUNITY

## 2024-01-03 RX ORDER — METHYLPREDNISOLONE ACETATE 40 MG/ML
80 INJECTION, SUSPENSION INTRA-ARTICULAR; INTRALESIONAL; INTRAMUSCULAR; SOFT TISSUE ONCE
Status: COMPLETED | OUTPATIENT
Start: 2024-01-03 | End: 2024-01-03

## 2024-01-03 RX ORDER — BUPIVACAINE HYDROCHLORIDE 2.5 MG/ML
3 INJECTION, SOLUTION EPIDURAL; INFILTRATION; INTRACAUDAL ONCE
Status: COMPLETED | OUTPATIENT
Start: 2024-01-03 | End: 2024-01-03

## 2024-01-03 RX ORDER — CYANOCOBALAMIN (VITAMIN B-12) 500 MCG
TABLET ORAL
COMMUNITY

## 2024-01-03 RX ADMIN — METHYLPREDNISOLONE ACETATE 80 MG: 40 INJECTION, SUSPENSION INTRA-ARTICULAR; INTRALESIONAL; INTRAMUSCULAR; SOFT TISSUE at 14:54

## 2024-01-03 RX ADMIN — BUPIVACAINE HYDROCHLORIDE 3 ML: 2.5 INJECTION, SOLUTION EPIDURAL; INFILTRATION; INTRACAUDAL at 14:54

## 2024-01-03 NOTE — PROGRESS NOTES
"Large joint arthrocentesis: L glenohumeral  Universal Protocol:  Consent: Verbal consent obtained. Written consent obtained.  Risks and benefits: risks, benefits and alternatives were discussed  Consent given by: patient  Time out: Immediately prior to procedure a \"time out\" was called to verify the correct patient, procedure, equipment, support staff and site/side marked as required.  Timeout called at: 1/3/2024 1:11 PM.  Patient understanding: patient states understanding of the procedure being performed  Patient consent: the patient's understanding of the procedure matches consent given  Procedure consent: procedure consent matches procedure scheduled  Relevant documents: relevant documents present and verified  Test results: test results available and properly labeled  Site marked: the operative site was marked  Radiology Images displayed and confirmed. If images not available, report reviewed: imaging studies available  Required items: required blood products, implants, devices, and special equipment available  Patient identity confirmed: verbally with patient and provided demographic data  Supporting Documentation  Indications: pain   Procedure Details  Location: shoulder - L glenohumeral  Preparation: Patient was prepped and draped in the usual sterile fashion  Ultrasound guidance: yes  Approach: posterior    Patient tolerance: patient tolerated the procedure well with no immediate complications  Dressing:  Sterile dressing applied    PROCEDURE NOTE    PATIENT NAME:  Marisol Otoole    MEDICAL RECORD NUMBER:  [id number]    YOB: 1940    DATE OF PROCEDURE:  01/03/24    PROCEDURE: left glenohumeral joint steroid injection under ultrasound guidance     PREPROCEDURE DIAGNOSIS: Osteoarthritis of shoulder     PHYSICIAN: Juan Montero MD     MEDICATIONS INJECTED: 2 mL of Depomedrol 40mg/ml and 3 ml 0.25% preservative-free bupivacaine        SEDATION MEDICATIONS: None     ESTIMATED BLOOD LOSS: None   "   COMPLICATIONS: None     TECHNIQUE: Time-out was taken to identify the correct patient, procedure and side prior to starting the procedure. While in the sitting position, the patient was prepped and draped in the usual sterile fashion using ChloraPrep  as well as sterile towels. The target site was determined under ultrasound. A 21-gauge needle was advanced under ultrasound guidance from a poserior appraoch. When the tip of the needle was thought to be in the appropriate position along the humeral head, aspiration was attempted to check for intravascular placement. Care was taken to avoid injection of the labrum. Medication was then injected slowly. The procedure was completed without complications and was tolerated well. The patient was monitored after the procedure. The patient was given post-procedure and discharge instructions to follow at home. The patient was discharged in stable condition and instructed that we would call in 7 days for follow-up

## 2024-01-08 ENCOUNTER — TELEPHONE (OUTPATIENT)
Dept: FAMILY MEDICINE CLINIC | Facility: CLINIC | Age: 84
End: 2024-01-08

## 2024-01-08 DIAGNOSIS — R21 RASH: Primary | ICD-10-CM

## 2024-01-08 RX ORDER — TRIAMCINOLONE ACETONIDE 1 MG/G
CREAM TOPICAL 2 TIMES DAILY
Qty: 15 G | Refills: 1 | Status: SHIPPED | OUTPATIENT
Start: 2024-01-08

## 2024-01-08 NOTE — TELEPHONE ENCOUNTER
Patient called back- she wants to know, can she send a picture of the rash or can we send it in? She is going to have a hard time coming into the office.    Please advise, thank you!

## 2024-01-08 NOTE — TELEPHONE ENCOUNTER
She has a rash on both legs, thinks it's an allergic reaction.  She wanted to know if she could forward a picture, can't get in due to the weather.    Called back and left a message to call the office.      Need to know if she can email the pictures.

## 2024-01-10 ENCOUNTER — TELEPHONE (OUTPATIENT)
Dept: PAIN MEDICINE | Facility: CLINIC | Age: 84
End: 2024-01-10

## 2024-01-10 ENCOUNTER — TELEPHONE (OUTPATIENT)
Dept: FAMILY MEDICINE CLINIC | Facility: CLINIC | Age: 84
End: 2024-01-10

## 2024-01-10 DIAGNOSIS — R21 RASH: Primary | ICD-10-CM

## 2024-01-10 NOTE — TELEPHONE ENCOUNTER
She only has a little bit of the kenalog left and insurance won't cover another 10 day. She still has itchiness. Please advise.

## 2024-01-11 ENCOUNTER — TELEPHONE (OUTPATIENT)
Dept: PAIN MEDICINE | Facility: CLINIC | Age: 84
End: 2024-01-11

## 2024-01-11 NOTE — TELEPHONE ENCOUNTER
LM for patient told her it would be a better option to take pony then the lyft because she needs assistance getting into the vehicle provided number for pony

## 2024-01-13 DIAGNOSIS — K21.00 GASTROESOPHAGEAL REFLUX DISEASE WITH ESOPHAGITIS: ICD-10-CM

## 2024-01-15 RX ORDER — OMEPRAZOLE 40 MG/1
CAPSULE, DELAYED RELEASE ORAL
Qty: 180 CAPSULE | Refills: 1 | Status: SHIPPED | OUTPATIENT
Start: 2024-01-15

## 2024-01-15 NOTE — TELEPHONE ENCOUNTER
Esteban for pt to cb on both contact #s.  Where is rash? She is currently being treated by PCP with steroid cream for rash.  Pt is scheduled 1/17 for USGI R shoulder

## 2024-01-15 NOTE — TELEPHONE ENCOUNTER
Pt has generalized body rash and is currently being treated by PCP with steroid cream for rash.  Pt is scheduled 1/17 for USGI R shoulder  Pt asking if she can still have procedure or reschedule til after rash has resolved?

## 2024-01-15 NOTE — TELEPHONE ENCOUNTER
Caller: Patient    Doctor: Winston    Reason for call: Returning missed call from Nurse.  RN not available at the time of call.  Pt states she does still have the rash and is itchy all over her body.  Advised clinical will reach back out to her to discuss next step.    Call back#: [phone]

## 2024-01-15 NOTE — TELEPHONE ENCOUNTER
Caller: christal Damon    Doctor: Winston    Reason for call: pt stated she has a rash all over her body. Pt is scheduled to have a injection done on 1/17/24. Pt unsure if she needs to cancel or not. Please Advise    Call back#: [phone]

## 2024-01-15 NOTE — TELEPHONE ENCOUNTER
Left message for patient that [facility] would be canceled and left the number to call back to reschedule

## 2024-01-15 NOTE — TELEPHONE ENCOUNTER
Caller: Marisol WIGGINS    Doctor/Office: Dr Montero    Call regarding :  Reschedule      Call was transferred to:

## 2024-01-16 ENCOUNTER — OFFICE VISIT (OUTPATIENT)
Dept: FAMILY MEDICINE CLINIC | Facility: CLINIC | Age: 84
End: 2024-01-16
Payer: MEDICARE

## 2024-01-16 VITALS
WEIGHT: 181 LBS | HEART RATE: 66 BPM | SYSTOLIC BLOOD PRESSURE: 110 MMHG | HEIGHT: 55 IN | OXYGEN SATURATION: 91 % | BODY MASS INDEX: 41.89 KG/M2 | DIASTOLIC BLOOD PRESSURE: 70 MMHG

## 2024-01-16 DIAGNOSIS — Z91.81 AT RISK FOR INJURY RELATED TO FALL: ICD-10-CM

## 2024-01-16 DIAGNOSIS — R21 RASH AND NONSPECIFIC SKIN ERUPTION: Primary | ICD-10-CM

## 2024-01-16 DIAGNOSIS — R26.2 AMBULATORY DYSFUNCTION: ICD-10-CM

## 2024-01-16 DIAGNOSIS — Z99.89 WALKER AS AMBULATION AID: ICD-10-CM

## 2024-01-16 PROCEDURE — 99214 OFFICE O/P EST MOD 30 MIN: CPT | Performed by: NURSE PRACTITIONER

## 2024-01-16 RX ORDER — METHYLPREDNISOLONE 4 MG/1
TABLET ORAL
Qty: 21 EACH | Refills: 0 | Status: SHIPPED | OUTPATIENT
Start: 2024-01-16 | End: 2024-01-25

## 2024-01-16 RX ORDER — AMOXICILLIN 500 MG/1
CAPSULE ORAL
COMMUNITY
Start: 2023-11-06 | End: 2024-01-16

## 2024-01-16 NOTE — ASSESSMENT & PLAN NOTE
Would benefit from motorized scooter in order to remain safe and prevent future falls.  Due to her multiple falls, patient is unsafe while using walker, cane, or manual wheelchair.

## 2024-01-16 NOTE — ASSESSMENT & PLAN NOTE
Patient with history of multiple falls due to abnormality of gait andweakness.  She is on daily aspirin which puts her at increased risk of bleeding post fall.  She does use walker currently but is unsafe when performing her daily ADLs with its use.  She continues to require assist of 1 and device in order to remain safe.  Due to multiple falls with daily use of walker, pain, she would greatly benefit from motorized scooter.

## 2024-01-16 NOTE — PROGRESS NOTES
Name: Marisol Otoole      : 1940      MRN: [id number]  Encounter Provider: MABEL Hallman  Encounter Date: 2024   Encounter department: [facility] [address]    Assessment & Plan     1. Rash and nonspecific skin eruption  Comments:  Given Medrol Dosepak.  Advised Benadryl as needed.  Advised to call if rash is not improving.  Orders:  -     methylPREDNISolone 4 MG tablet therapy pack; Use as directed on package    2. At risk for injury related to fall  Assessment & Plan:  Patient with history of multiple falls due to abnormality of gait andweakness.  She is on daily aspirin which puts her at increased risk of bleeding post fall.  She does use walker currently but is unsafe when performing her daily ADLs with its use.  She continues to require assist of 1 and device in order to remain safe.  Due to multiple falls with daily use of walker, pain, she would greatly benefit from motorized scooter.         3. Walker as ambulation aid  Assessment & Plan:  Would benefit from motorized scooter in order to remain safe and prevent future falls.  Due to her multiple falls, patient is unsafe while using walker, cane, or manual wheelchair.       4. Ambulatory dysfunction  Assessment & Plan:  Patient is unable to stand up from seated position without assistance of another person or device.  Reports increased pain while using  a walker.              Subjective      Patient presents for concerns of rash.  She states this started 1 week ago on her legs and came up to her trunk.  The rash is extremely itchy.  Denies any associated symptoms such as shortness of breath, itchy throat or neck swelling.  Patient has been using hydrocortisone and triamcinolone cream with no relief.  She states she recently started taking turmeric with fish oil, this is the only new medication or hygiene product.  She has also been using calamine lotion on her legs with little relief.  Patient is once  again requesting a motorized scooter.  We both feel that this is the best mode of ambulatory assistance due to stature, mobility and history of falls.      Review of Systems   Constitutional:  Negative for chills, diaphoresis and fever.   Respiratory:  Negative for cough and shortness of breath.    Cardiovascular: Negative.    Gastrointestinal: Negative.    Musculoskeletal:  Positive for arthralgias, back pain and gait problem.   Skin:  Positive for rash.       Current Outpatient Medications on File Prior to Visit   Medication Sig    acetaminophen (TYLENOL) 500 mg tablet Take 500 mg by mouth every 6 (six) hours as needed    aspirin (ECOTRIN LOW STRENGTH) 81 mg EC tablet Take 1 tablet (81 mg total) by mouth daily    b complex vitamins capsule Take 1 capsule by mouth 2 (two) times a day      Calcium Carb-Cholecalciferol (CALCIUM 600 + D PO) Take 1 tablet by mouth 2 (two) times a day    Cranberry 250 MG TABS Take by mouth    Fesoterodine Fumarate ER (Toviaz) 8 MG TB24 Take 8 mg by mouth daily. Not taking  Indications: Urinary Incontinence    furosemide (LASIX) 20 mg tablet Take 1 tablet (20 mg total) by mouth daily as needed (wt gain)    hydrocortisone 2.5 % cream Apply topically 2 (two) times a day    levothyroxine 50 mcg tablet TAKE 1 TABLET BY MOUTH EVERY DAY    methocarbamol (ROBAXIN) 500 mg tablet Take 1 tablet (500 mg total) by mouth 3 (three) times a day    Myrbetriq 50 MG TB24 Take 1 tablet by mouth daily    ofloxacin (OCUFLOX) 0.3 % ophthalmic solution 5 drops twice daily to left ear x 10 days.    olmesartan (BENICAR) 5 mg tablet TAKE 2 TABLETS BY MOUTH EVERY DAY    Omega-3 Fatty Acids (Fish Oil) 300 MG CAPS Take by mouth    omeprazole (PriLOSEC) 40 MG capsule TAKE 1 CAPSULE BY MOUTH TWICE A DAY    oxygen gas Inhale 2 L/min continuous. Indications: copd    sertraline (ZOLOFT) 100 mg tablet TAKE 1 TABLET BY MOUTH EVERY DAY    simvastatin (ZOCOR) 40 mg tablet TAKE 1 TABLET BY MOUTH EVERYDAY AT BEDTIME     "triamcinolone (KENALOG) 0.1 % cream Apply topically 2 (two) times a day    Turmeric (QC Tumeric Complex) 500 MG CAPS Take by mouth    [DISCONTINUED] amoxicillin (AMOXIL) 500 mg capsule TAKE 4 CAPSULES BY MOUTH 30-60 MINUTES PRIOR TO DENTAL PROCEDURE (Patient not taking: Reported on 1/16/2024)    [DISCONTINUED] cyclobenzaprine (FLEXERIL) 5 mg tablet Take 1 tablet (5 mg total) by mouth 2 (two) times a day as needed for muscle spasms    [DISCONTINUED] meloxicam (Mobic) 15 mg tablet Take 1 tablet (15 mg total) by mouth daily       Objective     /70   Pulse 66   Ht 4' 7\" (1.397 m)   Wt 82.1 kg (181 lb)   SpO2 91%   BMI 42.07 kg/m²     Physical Exam  Constitutional:       Appearance: She is well-developed.   Cardiovascular:      Rate and Rhythm: Normal rate and regular rhythm.      Heart sounds: Murmur heard.   Pulmonary:      Effort: Respiratory distress (with exertion) present.      Breath sounds: Normal breath sounds.   Skin:     General: Skin is warm and dry.      Findings: Rash present. Rash is urticarial.   Neurological:      Mental Status: She is alert and oriented to person, place, and time.      Cranial Nerves: No cranial nerve deficit.      Motor: Weakness present.      Coordination: Coordination abnormal.      Gait: Gait abnormal.   Psychiatric:         Mood and Affect: Mood normal.       MABEL Hallman    "

## 2024-01-16 NOTE — ASSESSMENT & PLAN NOTE
Patient is unable to stand up from seated position without assistance of another person or device.  Reports increased pain while using  a walker.

## 2024-01-17 ENCOUNTER — TELEPHONE (OUTPATIENT)
Dept: FAMILY MEDICINE CLINIC | Facility: CLINIC | Age: 84
End: 2024-01-17

## 2024-01-18 ENCOUNTER — RA CDI HCC (OUTPATIENT)
Dept: OTHER | Facility: HOSPITAL | Age: 84
End: 2024-01-18

## 2024-01-22 ENCOUNTER — APPOINTMENT (OUTPATIENT)
Dept: LAB | Facility: HOSPITAL | Age: 84
End: 2024-01-22
Payer: MEDICARE

## 2024-01-22 DIAGNOSIS — N18.9 ACUTE KIDNEY INJURY SUPERIMPOSED ON CKD: ICD-10-CM

## 2024-01-22 DIAGNOSIS — N17.9 AKI (ACUTE KIDNEY INJURY) (HCC): ICD-10-CM

## 2024-01-22 DIAGNOSIS — D50.8 IRON DEFICIENCY ANEMIA SECONDARY TO INADEQUATE DIETARY IRON INTAKE: ICD-10-CM

## 2024-01-22 DIAGNOSIS — D72.829 LEUKOCYTOSIS, UNSPECIFIED TYPE: ICD-10-CM

## 2024-01-22 DIAGNOSIS — Z00.00 HEALTHCARE MAINTENANCE: ICD-10-CM

## 2024-01-22 DIAGNOSIS — N17.9 ACUTE KIDNEY INJURY SUPERIMPOSED ON CKD: ICD-10-CM

## 2024-01-22 LAB
ALBUMIN SERPL BCP-MCNC: 4.1 G/DL (ref 3.5–5)
ALP SERPL-CCNC: 56 U/L (ref 34–104)
ALT SERPL W P-5'-P-CCNC: 18 U/L (ref 7–52)
ANION GAP SERPL CALCULATED.3IONS-SCNC: 5 MMOL/L
AST SERPL W P-5'-P-CCNC: 19 U/L (ref 13–39)
BASOPHILS # BLD AUTO: 0.09 THOUSANDS/ÂΜL (ref 0–0.1)
BASOPHILS NFR BLD AUTO: 1 % (ref 0–1)
BILIRUB SERPL-MCNC: 0.51 MG/DL (ref 0.2–1)
BUN SERPL-MCNC: 35 MG/DL (ref 5–25)
CALCIUM SERPL-MCNC: 10.1 MG/DL (ref 8.4–10.2)
CHLORIDE SERPL-SCNC: 104 MMOL/L (ref 96–108)
CHOLEST SERPL-MCNC: 149 MG/DL
CO2 SERPL-SCNC: 31 MMOL/L (ref 21–32)
CREAT SERPL-MCNC: 0.94 MG/DL (ref 0.6–1.3)
EOSINOPHIL # BLD AUTO: 0.89 THOUSAND/ÂΜL (ref 0–0.61)
EOSINOPHIL NFR BLD AUTO: 10 % (ref 0–6)
ERYTHROCYTE [DISTWIDTH] IN BLOOD BY AUTOMATED COUNT: 15.5 % (ref 11.6–15.1)
FERRITIN SERPL-MCNC: 28 NG/ML (ref 11–307)
GFR SERPL CREATININE-BSD FRML MDRD: 55 ML/MIN/1.73SQ M
GLUCOSE P FAST SERPL-MCNC: 80 MG/DL (ref 65–99)
HCT VFR BLD AUTO: 41 % (ref 34.8–46.1)
HDLC SERPL-MCNC: 77 MG/DL
HGB BLD-MCNC: 12.7 G/DL (ref 11.5–15.4)
IMM GRANULOCYTES # BLD AUTO: 0.03 THOUSAND/UL (ref 0–0.2)
IMM GRANULOCYTES NFR BLD AUTO: 0 % (ref 0–2)
IRON SATN MFR SERPL: 19 % (ref 15–50)
IRON SERPL-MCNC: 71 UG/DL (ref 50–212)
LDLC SERPL CALC-MCNC: 53 MG/DL (ref 0–100)
LYMPHOCYTES # BLD AUTO: 3.35 THOUSANDS/ÂΜL (ref 0.6–4.47)
LYMPHOCYTES NFR BLD AUTO: 39 % (ref 14–44)
MCH RBC QN AUTO: 26.8 PG (ref 26.8–34.3)
MCHC RBC AUTO-ENTMCNC: 31 G/DL (ref 31.4–37.4)
MCV RBC AUTO: 87 FL (ref 82–98)
MONOCYTES # BLD AUTO: 0.77 THOUSAND/ÂΜL (ref 0.17–1.22)
MONOCYTES NFR BLD AUTO: 9 % (ref 4–12)
NEUTROPHILS # BLD AUTO: 3.42 THOUSANDS/ÂΜL (ref 1.85–7.62)
NEUTS SEG NFR BLD AUTO: 41 % (ref 43–75)
NONHDLC SERPL-MCNC: 72 MG/DL
NRBC BLD AUTO-RTO: 0 /100 WBCS
PLATELET # BLD AUTO: 294 THOUSANDS/UL (ref 149–390)
PMV BLD AUTO: 10.9 FL (ref 8.9–12.7)
POTASSIUM SERPL-SCNC: 5 MMOL/L (ref 3.5–5.3)
PROT SERPL-MCNC: 7 G/DL (ref 6.4–8.4)
RBC # BLD AUTO: 4.74 MILLION/UL (ref 3.81–5.12)
SODIUM SERPL-SCNC: 140 MMOL/L (ref 135–147)
TIBC SERPL-MCNC: 371 UG/DL (ref 250–450)
TRIGL SERPL-MCNC: 95 MG/DL
TSH SERPL DL<=0.05 MIU/L-ACNC: 1.75 UIU/ML (ref 0.45–4.5)
UIBC SERPL-MCNC: 300 UG/DL (ref 155–355)
WBC # BLD AUTO: 8.55 THOUSAND/UL (ref 4.31–10.16)

## 2024-01-22 PROCEDURE — 83550 IRON BINDING TEST: CPT

## 2024-01-22 PROCEDURE — 82728 ASSAY OF FERRITIN: CPT

## 2024-01-22 PROCEDURE — 84443 ASSAY THYROID STIM HORMONE: CPT

## 2024-01-22 PROCEDURE — 83540 ASSAY OF IRON: CPT

## 2024-01-22 PROCEDURE — 80053 COMPREHEN METABOLIC PANEL: CPT

## 2024-01-22 PROCEDURE — 80061 LIPID PANEL: CPT

## 2024-01-22 PROCEDURE — 36415 COLL VENOUS BLD VENIPUNCTURE: CPT

## 2024-01-22 PROCEDURE — 85025 COMPLETE CBC W/AUTO DIFF WBC: CPT

## 2024-01-25 ENCOUNTER — OFFICE VISIT (OUTPATIENT)
Dept: FAMILY MEDICINE CLINIC | Facility: CLINIC | Age: 84
End: 2024-01-25
Payer: MEDICARE

## 2024-01-25 VITALS
WEIGHT: 183 LBS | BODY MASS INDEX: 42.35 KG/M2 | OXYGEN SATURATION: 95 % | HEIGHT: 55 IN | SYSTOLIC BLOOD PRESSURE: 120 MMHG | HEART RATE: 71 BPM | DIASTOLIC BLOOD PRESSURE: 80 MMHG

## 2024-01-25 DIAGNOSIS — M25.511 CHRONIC PAIN OF BOTH SHOULDERS: ICD-10-CM

## 2024-01-25 DIAGNOSIS — M54.32 LEFT SIDED SCIATICA: Primary | ICD-10-CM

## 2024-01-25 DIAGNOSIS — I50.32 CHRONIC DIASTOLIC (CONGESTIVE) HEART FAILURE (HCC): ICD-10-CM

## 2024-01-25 DIAGNOSIS — E66.01 OBESITY, MORBID (HCC): ICD-10-CM

## 2024-01-25 DIAGNOSIS — R21 RASH: ICD-10-CM

## 2024-01-25 DIAGNOSIS — N18.31 STAGE 3A CHRONIC KIDNEY DISEASE (HCC): ICD-10-CM

## 2024-01-25 DIAGNOSIS — D50.8 IRON DEFICIENCY ANEMIA SECONDARY TO INADEQUATE DIETARY IRON INTAKE: ICD-10-CM

## 2024-01-25 DIAGNOSIS — M25.512 CHRONIC PAIN OF BOTH SHOULDERS: ICD-10-CM

## 2024-01-25 DIAGNOSIS — G89.29 CHRONIC PAIN OF BOTH SHOULDERS: ICD-10-CM

## 2024-01-25 DIAGNOSIS — F33.9 DEPRESSION, RECURRENT (HCC): ICD-10-CM

## 2024-01-25 DIAGNOSIS — E03.9 ACQUIRED HYPOTHYROIDISM: ICD-10-CM

## 2024-01-25 DIAGNOSIS — D44.7 NEOPLASM OF UNCERTAIN BEHAVIOR OF AORTIC BODY AND OTHER PARAGANGLIA (HCC): ICD-10-CM

## 2024-01-25 DIAGNOSIS — J44.9 COPD, MILD (HCC): ICD-10-CM

## 2024-01-25 DIAGNOSIS — I27.20 PULMONARY HYPERTENSION (HCC): ICD-10-CM

## 2024-01-25 DIAGNOSIS — M54.16 RADICULOPATHY, LUMBAR REGION: ICD-10-CM

## 2024-01-25 DIAGNOSIS — Z95.2 S/P TAVR (TRANSCATHETER AORTIC VALVE REPLACEMENT): ICD-10-CM

## 2024-01-25 DIAGNOSIS — Z78.0 POSTMENOPAUSAL: ICD-10-CM

## 2024-01-25 DIAGNOSIS — I10 PRIMARY HYPERTENSION: ICD-10-CM

## 2024-01-25 DIAGNOSIS — J96.11 CHRONIC HYPOXEMIC RESPIRATORY FAILURE (HCC): ICD-10-CM

## 2024-01-25 PROCEDURE — 99214 OFFICE O/P EST MOD 30 MIN: CPT | Performed by: NURSE PRACTITIONER

## 2024-01-25 RX ORDER — TRIAMCINOLONE ACETONIDE 1 MG/G
CREAM TOPICAL 2 TIMES DAILY
Qty: 15 G | Refills: 1 | Status: SHIPPED | OUTPATIENT
Start: 2024-01-25

## 2024-01-25 NOTE — ASSESSMENT & PLAN NOTE
Lab Results   Component Value Date    EGFR 55 01/22/2024    EGFR 62 10/10/2023    EGFR 29 08/18/2023    CREATININE 0.94 01/22/2024    CREATININE 0.86 10/10/2023    CREATININE 1.62 (H) 08/18/2023   Kidney function has improved over the last several months.  Recommend avoiding over-the-counter NSAIDs.

## 2024-01-25 NOTE — ASSESSMENT & PLAN NOTE
Wt Readings from Last 3 Encounters:   01/25/24 83 kg (183 lb)   01/16/24 82.1 kg (181 lb)   01/03/24 82.6 kg (182 lb)       Euvolemic on exam.  Continue furosemide as needed.

## 2024-01-25 NOTE — PROGRESS NOTES
Name: Marisol Otoole      : 1940      MRN: [id number]  Encounter Provider: MABEL Hallman  Encounter Date: 2024   Encounter department: [facility] [address]    Assessment & Plan     1. Left sided sciatica  -     EXTERNAL Referral to Home Health; Future    2. Chronic pain of both shoulders  -     EXTERNAL Referral to Home Health; Future    3. Pulmonary hypertension (HCC)    4. COPD, mild (HCC)  Assessment & Plan:  Breathing is stable currently.      5. Depression, recurrent (HCC)  Assessment & Plan:  Doing well with sertraline daily.      6. Obesity, morbid (HCC)  Assessment & Plan:  Advised weight loss with healthy diet and exercise.      7. Stage 3a chronic kidney disease (HCC)  Assessment & Plan:  Lab Results   Component Value Date    EGFR 55 2024    EGFR 62 10/10/2023    EGFR 29 2023    CREATININE 0.94 2024    CREATININE 0.86 10/10/2023    CREATININE 1.62 (H) 2023   Kidney function has improved over the last several months.  Recommend avoiding over-the-counter NSAIDs.      8. 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 Rash  -     triamcinolone (KENALOG) 0.1 % cream; Apply topically 2 (two) times a day    11. Postmenopausal  -     DXA bone density spine hip and pelvis; Future; Expected date: 2024    12. Acquired hypothyroidism  Assessment & Plan:  Continues on 50 mcg of levothyroxine daily.  TSH within normal limits.      13. Chronic diastolic (congestive) heart failure (HCC)  Assessment & Plan:  Wt Readings from Last 3 Encounters:   24 83 kg (183 lb)   24 82.1 kg (181 lb)   24 82.6 kg (182 lb)       Euvolemic on exam.  Continue furosemide as needed.          14. Primary hypertension  Assessment & Plan:  Blood pressure is well-managed with olmesartan daily.      15. Radiculopathy, lumbar region    16. Iron deficiency anemia  secondary to inadequate dietary iron intake  Assessment & Plan:  Stable.      17. S/P TAVR (transcatheter aortic valve replacement)           Subjective      Patient presents for routine follow up. She continues with bilateral shoulder pain and left leg pain. She is seeing pain management.  She did have an injection in her left shoulder, but states she does not think that it helped.  She is still having a lot of pain.  She is taking over-the-counter pain medications with no relief.  She does not want to do physical therapy as she feels like it would not help.  She wants an injection for her back.    Review of Systems   Constitutional:  Negative for chills and fever.   HENT:  Negative for ear pain and sore throat.    Eyes:  Negative for pain and visual disturbance.   Respiratory:  Negative for cough and shortness of breath.    Cardiovascular:  Negative for chest pain and palpitations.   Gastrointestinal:  Negative for abdominal pain and vomiting.   Genitourinary:  Negative for dysuria, frequency, hematuria and urgency.   Musculoskeletal:  Positive for arthralgias, back pain and gait problem.   Skin:  Negative for color change and rash.   Neurological:  Negative for dizziness, seizures, syncope, light-headedness and headaches.   Psychiatric/Behavioral:  Positive for sleep disturbance. Negative for dysphoric mood. The patient is not nervous/anxious.    All other systems reviewed and are negative.      Current Outpatient Medications on File Prior to Visit   Medication Sig   • acetaminophen (TYLENOL) 500 mg tablet Take 500 mg by mouth every 6 (six) hours as needed   • aspirin (ECOTRIN LOW STRENGTH) 81 mg EC tablet Take 1 tablet (81 mg total) by mouth daily   • b complex vitamins capsule Take 1 capsule by mouth 2 (two) times a day     • Calcium Carb-Cholecalciferol (CALCIUM 600 + D PO) Take 1 tablet by mouth 2 (two) times a day   • Cranberry 250 MG TABS Take by mouth   • Fesoterodine Fumarate ER (Toviaz) 8 MG TB24 Take 8  "mg by mouth daily. Not taking  Indications: Urinary Incontinence   • furosemide (LASIX) 20 mg tablet Take 1 tablet (20 mg total) by mouth daily as needed (wt gain)   • hydrocortisone 2.5 % cream Apply topically 2 (two) times a day   • levothyroxine 50 mcg tablet TAKE 1 TABLET BY MOUTH EVERY DAY   • methocarbamol (ROBAXIN) 500 mg tablet Take 1 tablet (500 mg total) by mouth 3 (three) times a day   • Myrbetriq 50 MG TB24 Take 1 tablet by mouth daily   • ofloxacin (OCUFLOX) 0.3 % ophthalmic solution 5 drops twice daily to left ear x 10 days.   • olmesartan (BENICAR) 5 mg tablet TAKE 2 TABLETS BY MOUTH EVERY DAY   • Omega-3 Fatty Acids (Fish Oil) 300 MG CAPS Take by mouth   • omeprazole (PriLOSEC) 40 MG capsule TAKE 1 CAPSULE BY MOUTH TWICE A DAY   • oxygen gas Inhale 2 L/min continuous. Indications: copd   • sertraline (ZOLOFT) 100 mg tablet TAKE 1 TABLET BY MOUTH EVERY DAY   • simvastatin (ZOCOR) 40 mg tablet TAKE 1 TABLET BY MOUTH EVERYDAY AT BEDTIME   • Turmeric (QC Tumeric Complex) 500 MG CAPS Take by mouth   • [DISCONTINUED] methylPREDNISolone 4 MG tablet therapy pack Use as directed on package   • [DISCONTINUED] triamcinolone (KENALOG) 0.1 % cream Apply topically 2 (two) times a day   • [DISCONTINUED] cyclobenzaprine (FLEXERIL) 5 mg tablet Take 1 tablet (5 mg total) by mouth 2 (two) times a day as needed for muscle spasms   • [DISCONTINUED] meloxicam (Mobic) 15 mg tablet Take 1 tablet (15 mg total) by mouth daily       Objective     /80   Pulse 71   Ht 4' 7\" (1.397 m)   Wt 83 kg (183 lb)   SpO2 95%   BMI 42.53 kg/m²     Physical Exam  Constitutional:       Appearance: She is well-developed. She is obese.   Cardiovascular:      Rate and Rhythm: Normal rate and regular rhythm.      Heart sounds: Murmur heard.   Pulmonary:      Effort: Pulmonary effort is normal. No respiratory distress.      Breath sounds: Normal breath sounds.   Skin:     General: Skin is warm and dry.   Neurological:      Mental " Status: She is alert and oriented to person, place, and time.     MABEL Hallman

## 2024-01-29 ENCOUNTER — PROCEDURE VISIT (OUTPATIENT)
Dept: PAIN MEDICINE | Facility: CLINIC | Age: 84
End: 2024-01-29
Payer: MEDICARE

## 2024-01-29 VITALS
DIASTOLIC BLOOD PRESSURE: 57 MMHG | HEART RATE: 76 BPM | WEIGHT: 183 LBS | BODY MASS INDEX: 42.35 KG/M2 | SYSTOLIC BLOOD PRESSURE: 107 MMHG | HEIGHT: 55 IN

## 2024-01-29 DIAGNOSIS — M25.511 CHRONIC RIGHT SHOULDER PAIN: Primary | ICD-10-CM

## 2024-01-29 DIAGNOSIS — G89.29 CHRONIC RIGHT SHOULDER PAIN: Primary | ICD-10-CM

## 2024-01-29 DIAGNOSIS — M19.011 ARTHRITIS OF RIGHT GLENOHUMERAL JOINT: ICD-10-CM

## 2024-01-29 PROCEDURE — 20611 DRAIN/INJ JOINT/BURSA W/US: CPT | Performed by: STUDENT IN AN ORGANIZED HEALTH CARE EDUCATION/TRAINING PROGRAM

## 2024-01-29 RX ORDER — BUPIVACAINE HYDROCHLORIDE 2.5 MG/ML
3 INJECTION, SOLUTION EPIDURAL; INFILTRATION; INTRACAUDAL ONCE
Status: COMPLETED | OUTPATIENT
Start: 2024-01-29 | End: 2024-01-29

## 2024-01-29 RX ORDER — METHYLPREDNISOLONE ACETATE 40 MG/ML
80 INJECTION, SUSPENSION INTRA-ARTICULAR; INTRALESIONAL; INTRAMUSCULAR; SOFT TISSUE ONCE
Status: COMPLETED | OUTPATIENT
Start: 2024-01-29 | End: 2024-01-29

## 2024-01-29 RX ADMIN — METHYLPREDNISOLONE ACETATE 80 MG: 40 INJECTION, SUSPENSION INTRA-ARTICULAR; INTRALESIONAL; INTRAMUSCULAR; SOFT TISSUE at 12:26

## 2024-01-29 RX ADMIN — BUPIVACAINE HYDROCHLORIDE 3 ML: 2.5 INJECTION, SOLUTION EPIDURAL; INFILTRATION; INTRACAUDAL at 12:26

## 2024-01-30 ENCOUNTER — TELEPHONE (OUTPATIENT)
Dept: FAMILY MEDICINE CLINIC | Facility: CLINIC | Age: 84
End: 2024-01-30

## 2024-01-30 DIAGNOSIS — M54.32 LEFT SIDED SCIATICA: Primary | ICD-10-CM

## 2024-01-30 NOTE — TELEPHONE ENCOUNTER
Spoke with patient- she has been summoned for jury duty. She is wondering, could we please write up a letter medically excusing her from jury duty?    Please advise, thank you!

## 2024-01-30 NOTE — TELEPHONE ENCOUNTER
Would you like patient to have occupational therapy? Adrienne would like to know and to fax referral to [phone]

## 2024-01-30 NOTE — TELEPHONE ENCOUNTER
Spoke with Adrienne from [facility]. She did her eval today on patient and while she was there, she made a concerning discovery. She stated that patients heart rate was only at 36 and it was irregular. She had patient then walk around a bit, go to the bathroom, kitchen, etc. And her heart rate was still only averaging 36. When Adrienne gave a listen to her, she said at one point it was only 32. This message is intended to make Diya aware.    Also, while speaking with Adrienne, she asked if we could please tag onto the referral occupational therapy.     Please advise, thank you!

## 2024-01-31 DIAGNOSIS — E78.5 HYPERLIPIDEMIA, UNSPECIFIED HYPERLIPIDEMIA TYPE: ICD-10-CM

## 2024-01-31 RX ORDER — SIMVASTATIN 40 MG
TABLET ORAL
Qty: 90 TABLET | Refills: 1 | Status: SHIPPED | OUTPATIENT
Start: 2024-01-31

## 2024-02-02 ENCOUNTER — TELEPHONE (OUTPATIENT)
Dept: FAMILY MEDICINE CLINIC | Facility: CLINIC | Age: 84
End: 2024-02-02

## 2024-02-02 NOTE — TELEPHONE ENCOUNTER
Can patient keep continue using 2 liter per minute of continuous oxygen via NC  Or use Cpap at night

## 2024-02-05 ENCOUNTER — TELEPHONE (OUTPATIENT)
Dept: PAIN MEDICINE | Facility: CLINIC | Age: 84
End: 2024-02-05

## 2024-02-05 ENCOUNTER — OFFICE VISIT (OUTPATIENT)
Dept: CARDIOLOGY CLINIC | Facility: CLINIC | Age: 84
End: 2024-02-05
Payer: MEDICARE

## 2024-02-05 VITALS
SYSTOLIC BLOOD PRESSURE: 122 MMHG | BODY MASS INDEX: 41.66 KG/M2 | OXYGEN SATURATION: 95 % | WEIGHT: 180 LBS | HEART RATE: 51 BPM | DIASTOLIC BLOOD PRESSURE: 68 MMHG | RESPIRATION RATE: 16 BRPM | HEIGHT: 55 IN

## 2024-02-05 DIAGNOSIS — E78.2 MIXED HYPERLIPIDEMIA: ICD-10-CM

## 2024-02-05 DIAGNOSIS — Z95.2 S/P TAVR (TRANSCATHETER AORTIC VALVE REPLACEMENT): ICD-10-CM

## 2024-02-05 DIAGNOSIS — I10 ESSENTIAL HYPERTENSION: ICD-10-CM

## 2024-02-05 DIAGNOSIS — I27.20 PULMONARY HYPERTENSION (HCC): ICD-10-CM

## 2024-02-05 DIAGNOSIS — G47.33 OSA (OBSTRUCTIVE SLEEP APNEA): ICD-10-CM

## 2024-02-05 DIAGNOSIS — I51.7 LVH (LEFT VENTRICULAR HYPERTROPHY): ICD-10-CM

## 2024-02-05 DIAGNOSIS — I34.2 NON-RHEUMATIC MITRAL VALVE STENOSIS: ICD-10-CM

## 2024-02-05 DIAGNOSIS — I35.0 SEVERE AORTIC STENOSIS: ICD-10-CM

## 2024-02-05 DIAGNOSIS — E66.01 MORBID OBESITY (HCC): ICD-10-CM

## 2024-02-05 DIAGNOSIS — R00.1 BRADYCARDIA: Primary | ICD-10-CM

## 2024-02-05 DIAGNOSIS — I50.32 CHRONIC DIASTOLIC (CONGESTIVE) HEART FAILURE (HCC): ICD-10-CM

## 2024-02-05 PROCEDURE — 93000 ELECTROCARDIOGRAM COMPLETE: CPT | Performed by: INTERNAL MEDICINE

## 2024-02-05 PROCEDURE — 99214 OFFICE O/P EST MOD 30 MIN: CPT | Performed by: INTERNAL MEDICINE

## 2024-02-05 NOTE — PROGRESS NOTES
CARDIOLOGY OFFICE VISIT  [facility]  [address] [place], [place] [id number]  [address] [place] [id number]  Tel: [phone]      NAME: Marisol Otoole  AGE: 83 y.o.  SEX: female  : 1940  MRN: [id number]      Chief Complaint:  Chief Complaint   Patient presents with    Follow-up         History of Present Illness:   Patient states her home health nurse found her pulse rates low (in 30s) and sent her to ER.  In the ER her pulse rate was normal and she was evaluated and sent back home to follow-up with her cardiologist. Pt states she is doing well from cardiac stand point and denies chest pain / pressure, SOB, palpitations, lightheadedness, syncope, swelling feet, orthopnea, PND, claudication.    Severe aortic stenosis S/P TAVR on 10/9/18 -  On ASA.  Patient is aware of need for antibiotic prophylaxis prior to dental work     Chronic diastolic congestive heart failure -  Currently euvolemic.  On furosemide prn only, Olmesartan. Beta-blocker was held due to persistent SOB. States she watches her salt intake very closely    Essential hypertension, LVH -  Has been hypertensive for many years.  Taking medications regularly.  Denies lightheadedness, headache, medication side effects.      Mixed hyperlipidemia -  Has had hyperlipidemia for many years.  Taking statin regularly along with diet control.  Denies myalgia.  PCP closely monitoring the blood work.    Mitral stenosis -  Stable. Follow-up with serial echocardiograms     PHTN - from JANICE, valvular disease     Morbid obesity -  Has lost about 30 lbs weight over the last many months     JANICE - now uses CPAP. She does have history of pulmonary hypertension       Past Medical History:  Past Medical History:   Diagnosis Date    Anemia 2018    Anxiety     Arthritis     AVB (atrioventricular block)     first degree    Cataract     CHF (congestive heart failure) (HCC)     COPD, mild (HCC)     Coronary artery disease      Dislocation of right shoulder joint     Frequent UTI     GERD (gastroesophageal reflux disease)     H/O: pneumonia     Heme positive stool     Hyperlipidemia     Hypertension     Hypothyroidism     Morbid obesity with BMI of 50.0-59.9, adult (HCC)     Obesity, morbid ([facility]) 08/22/2018    JANICE on CPAP     Pulmonary hypertension ([facility]) 08/22/2018    Severe aortic stenosis     Simple goiter     Skin cyst     within the armpits, right    Wears glasses          Past Surgical History:  Past Surgical History:   Procedure Laterality Date    BREAST BIOPSY      CARDIAC CATHETERIZATION      CARPAL TUNNEL RELEASE Bilateral     CHOLECYSTECTOMY      DILATION AND CURETTAGE OF UTERUS      HYSTEROSCOPY      MASTOID SURGERY      AR COLONOSCOPY FLX DX W/COLLJ SPEC WHEN PFRMD N/A 9/6/2018    Procedure: COLONOSCOPY;  Surgeon: Shree Sosa III, MD;  Location: [facility];  Service: Gastroenterology    AR ECHO TRANSESOPHAG R-T 2D W/PRB IMG ACQUISJ I&R N/A 10/9/2018    Procedure: INTRA-OP TRANSESOPHAGEAL ECHOCARDIOGRAM (GARRISON);  Surgeon: Kushal Camarena DO;  Location: [facility];  Service: Cardiac Surgery    AR ESOPHAGOGASTRODUODENOSCOPY TRANSORAL DIAGNOSTIC N/A 8/31/2018    Procedure: ESOPHAGOGASTRODUODENOSCOPY (EGD);  Surgeon: Shree Sosa III, MD;  Location: [facility];  Service: Gastroenterology    AR REPLACE AORTIC VALVE OPENFEMORAL ARTERY APPROACH N/A 10/9/2018    Procedure: REPLACEMENT AORTIC VALVE TRANSCATHETER (TAVR) TRANSFEMORAL W/ 23 MM MENDOZA NOE S3 VALVE (ACCESS OF LEFT);  Surgeon: Kushal Camarena DO;  [facility];  Service: Cardiac Surgery    TOTAL HIP ARTHROPLASTY Left 2007    TOTAL KNEE ARTHROPLASTY Bilateral          Family History:  Family History   Problem Relation Age of Onset    Diabetes Mother     Stroke Mother     Cancer Father     Lung cancer Father     Diabetes Sister     Heart disease Sister     Hypertension Sister     Coronary artery disease Family     Diabetes Family     Hypertension Family      Cancer Family     Stroke Family     Thyroid disease Neg Hx          Social History:  Social History     Socioeconomic History    Marital status: Single     Spouse name: None    Number of children: None    Years of education: None    Highest education level: None   Occupational History    Occupation: retired   Tobacco Use    Smoking status: Never    Smokeless tobacco: Never   Vaping Use    Vaping status: Never Used   Substance and Sexual Activity    Alcohol use: No    Drug use: No    Sexual activity: Never   Other Topics Concern    None   Social History Narrative    Denied drinking coffee    Denied exercise habits    Most recent tobacco use screenin2018      Do you currently or have you served in the [employer]:   No      Were you activated, into active duty, as a member of the [employer] or as a Reservist:   No          Social Determinants of Health     Financial Resource Strain: Low Risk  (10/24/2023)    Overall Financial Resource Strain (CARDIA)     Difficulty of Paying Living Expenses: Not hard at all   Food Insecurity: No Food Insecurity (8/15/2023)    Hunger Vital Sign     Worried About Running Out of Food in the Last Year: Never true     Ran Out of Food in the Last Year: Never true   Transportation Needs: No Transportation Needs (10/24/2023)    PRAPARE - Transportation     Lack of Transportation (Medical): No     Lack of Transportation (Non-Medical): No   Physical Activity: Not on file   Stress: Not on file   Social Connections: Not on file   Intimate Partner Violence: Not on file   Housing Stability: Low Risk  (8/15/2023)    Housing Stability Vital Sign     Unable to Pay for Housing in the Last Year: No     Number of Places Lived in the Last Year: 1     Unstable Housing in the Last Year: No         Active Problems:  Patient Active Problem List   Diagnosis    Acquired hypothyroidism    Primary hypertension    Hyperlipidemia    JANICE (obstructive sleep apnea)    LVH (left ventricular  hypertrophy)    Mitral annular calcification    Mitral valve stenosis    Pulmonary hypertension (HCC)    Chronic diastolic (congestive) heart failure (HCC)    Iron deficiency anemia secondary to inadequate dietary iron intake    Obesity, morbid (HCC)    Gastroesophageal reflux disease without esophagitis    Primary osteoarthritis of left shoulder    AVB (atrioventricular block)    COPD, mild (HCC)    Coronary artery disease involving native coronary artery of native heart without angina pectoris    S/P TAVR (transcatheter aortic valve replacement)    Depression with anxiety    Insomnia    Osteopenia    Depression, recurrent (HCC)    Chronic hypoxemic respiratory failure (HCC)    Radiculopathy, lumbar region    Stage 3a chronic kidney disease (HCC)    Orbital mass    Fall    Ambulatory dysfunction    At risk for injury related to fall    Walker as ambulation aid         The following portions of the patient's history were reviewed and updated as appropriate: past medical history, past surgical history, past family history,  past social history, current medications, allergies and problem list.      Review of Systems:  Constitutional: Denies fever, chills  Eyes: Denies eye redness, eye discharge  ENT: Denies hearing loss, sneezing, nasal discharge, sore throat   Respiratory: Denies cough, expectoration  Cardiovascular: Denies chest pain, palpitations, lower extremity swelling  Gastrointestinal: Denies abdominal pain, nausea, vomiting, diarrhea  Genito-Urinary: Denies dysuria, incontinence  Musculoskeletal: Denies back pain  Neurologic: Denies lightheadedness, syncope, headache, seizures  Endocrine: Denies polydipsia, temperature intolerance  Allergy and Immunology: Denies hives, insect bite sensitivity  Hematological and Lymphatic: Denies bleeding problems, swollen glands   Psychological: Denies depression, suicidal ideation, anxiety, panic  Dermatological: Denies pruritus, rash, skin lesion  changes      Vitals:  Vitals:    02/05/24 1404   BP: 122/68   Pulse: (!) 51   Resp: 16   SpO2: 95%       Body mass index is 41.84 kg/m².    Weight (last 2 days)       Date/Time Weight    02/05/24 1404 81.6 (180)              Physical Examination:  General: Patient is not in acute distress. Awake, alert, oriented in time, place and person. Responding to commands.  Morbidly obese  Head: Normocephalic. Atraumatic  Eyes: Both pupils normal sized, round and reactive to light. Nonicteric  ENT: Normal external ear canals  Neck: Supple. JVP not raised. Trachea central. No thyromegaly  Lungs: Bilateral bronchovascular breath sounds with no crackles or rhonchi  Chest wall: No tenderness  Cardiovascular: RRR. S1 and S2 normal. No murmur, rub or gallop  Gastrointestinal: Abdomen soft, nontender. No guarding or rigidity. Liver and spleen not palpable. Bowel sounds present  Neurologic: Patient is awake, alert, oriented in time, place and person. Responding to commands. Moving all extremities  Integumentary:  No skin rash  Lymphatic: No cervical lymphadenopathy  Back: Symmetric. No CVA tenderness  Extremities: No clubbing, cyanosis or edema      Laboratory Results:  CBC with diff:   Lab Results   Component Value Date    WBC 8.55 01/22/2024    RBC 4.74 01/22/2024    HGB 12.7 01/22/2024    HCT 41.0 01/22/2024    MCV 87 01/22/2024    MCH 26.8 01/22/2024    RDW 15.5 (H) 01/22/2024     01/22/2024       CMP:  Lab Results   Component Value Date    CREATININE 0.77 01/30/2024    BUN 26 (H) 01/30/2024    K 4.6 01/30/2024     01/30/2024    CO2 29 01/30/2024    GLUCOSE 101 10/09/2018    ALKPHOS 63 01/30/2024    ALT 18 01/30/2024    AST 22 01/30/2024       Lab Results   Component Value Date    HGBA1C 6.1 (H) 08/18/2023    MG 1.7 01/30/2024       Lab Results   Component Value Date    TROPONINI <0.02 08/14/2018    TROPONINI <0.02 08/14/2018    TROPONINI <0.02 08/13/2018    CKTOTAL 540 (H) 08/14/2023       Lipid Profile:   No results  "found for: \"CHOL\"  Lab Results   Component Value Date    HDL 77 2024    HDL 67 2023    HDL 66 2023     Lab Results   Component Value Date    LDLCALC 53 2024    LDLCALC 54 2023    LDLCALC 58 2023     Lab Results   Component Value Date    TRIG 95 2024    TRIG 118 2023    TRIG 89 2023       Cardiac testing:   Results for orders placed during the hospital encounter of 22    Echo complete w/ contrast if indicated    Interpretation Summary    Left Ventricle: Left ventricular cavity size is normal. Wall thickness is mildly increased. There is mild concentric hypertrophy. Systolic function is normal - 60%. Wall motion is normal. Diastolic function is mildly abnormal, consistent with grade I (abnormal) relaxation.    Right Ventricle: Right ventricular cavity size is normal. Systolic function is normal.    Aortic Valve: There is an Cornelius NOE 3 23 mm TAVR bioprosthetic valve. Mean pressure gradient is 19 mm Hg.    Mitral Valve: There is severe annular calcification. There is moderate stenosis.    Tricuspid Valve: There is mild regurgitation. The right ventricular systolic pressure is mildly to moderately elevated (55 mm Hg).      Results for orders placed during the hospital encounter of 17    NM myocardial perfusion spect (rx stress and/or rest)    [facility]  [address] [place] [address] [phone]    Rest/Stress Gated SPECT Myocardial Perfusion Imaging After Regadenoson    Patient: MAURISIO ELENA  MR number: [id number]  Account number: [id number]  : 1940  Age: 76 years  Gender: Female  Status: Outpatient  Location: [facility]  Height: 53 in  Weight: 214 lb  BP: 156/ 96 mmHg    Allergies: LATEX, NEOMYCIN-BACITRACIN ZN-POLYMYX    Diagnosis: R07.9 - Chest pain, unspecified    Primary Physician:  Aaron Rashid MD  Interpreting Physician:  Bro Esteves MD  Referring Physician:  Bro" MD Danielito  Technician:  Aaron Alejandre BS, BA, AART(N)  Group:  [facility]  Other:  Carlie Beaulieu MS, CCT    INDICATIONS: Detection of coronary artery disease.    HISTORY: The patient is a 76 year old  female. Chest pain status: chest pain. Coronary artery disease risk factors: dyslipidemia, family history of premature coronary artery disease, and post-menopausal state. Cardiovascular  history: aortic valve disease. Co-morbidity: obesity. Medications: aspirin, a lipid lowering agent, and thyroid medications.    REST ECG: Normal sinus rhythm. Poor R wave progression in precordial leads.    PROCEDURE: The study was performed at [facility]. A regadenoson infusion pharmacologic stress test was performed. Gated SPECT myocardial perfusion imaging was performed after stress and at rest. Systolic blood pressure  was 156 mmHg, at the start of the study. Diastolic blood pressure was 96 mmHg, at the start of the study. The heart rate was 75 bpm, at the start of the study. Patient was not experiencing chest pain at the time of the injection of the  radiopharmaceutical.  Regadenoson protocol:  HR bpm SBP mmHg DBP mmHg Symptoms ST change Rhythm/conduct  Baseline 75 156 96 none none NSR, no ectopy, rare PVC's  Immediate 91 138 78 -- -- occasional PVC's  1 min 89 -- -- headache -- occasional PVC's  2 min 87 132 78 -- -- --  No medications or fluids given. IV aminophylline was administered.    STRESS SUMMARY: Duration of pharmacologic stress was 3 min. Maximal heart rate during stress was 91 bpm. The heart rate response to stress was normal. There was normal resting blood pressure with an appropriate response to stress. The  rate-pressure product for the peak heart rate and blood pressure was 39157. There was no chest pain during stress. The stress test was terminated due to protocol completion. The stress ECG was negative for ischemia. Arrhythmia during  stress: isolated  premature ventricular beats.    ISOTOPE ADMINISTRATION:  Resting isotope administration Stress isotope administration  Agent Sestamibi Sestamibi  Dose 15.62 mCi 47.1 mCi  Date 11/01/2017 11/01/2017  Injection-image interval 30 min 45 min    The radiopharmaceutical was injected at the peak effect of pharmacologic stress.    MYOCARDIAL PERFUSION IMAGING:  The image quality was good. Left ventricular size was normal.    PERFUSION DEFECTS:  -  There were no perfusion defects.    GATED SPECT:  The calculated left ventricular ejection fraction was 61 %. Left ventricular ejection fraction was within normal limits by visual estimate. There was no diagnostic evidence for left ventricular regional abnormality.    SUMMARY:  -  Stress results: There was no chest pain during stress.  -  ECG conclusions: The stress ECG was negative for ischemia. Arrhythmia during stress: isolated premature ventricular beats.  -  Perfusion imaging: There were no perfusion defects.  -  Gated SPECT: The calculated left ventricular ejection fraction was 61 %. Left ventricular ejection fraction was within normal limits by visual estimate. There was no diagnostic evidence for left ventricular regional abnormality.    IMPRESSIONS: Normal study after pharmacologic stress. Myocardial perfusion imaging was normal at rest and with stress. Left ventricular systolic function was normal.    Prepared and signed by    Bro Esteves MD  Signed 11/01/2017 17:31:28      EKG:       Medications:    Current Outpatient Medications:     acetaminophen (TYLENOL) 500 mg tablet, Take 500 mg by mouth every 6 (six) hours as needed, Disp: , Rfl:     aspirin (ECOTRIN LOW STRENGTH) 81 mg EC tablet, Take 1 tablet (81 mg total) by mouth daily, Disp: 100 tablet, Rfl: 0    b complex vitamins capsule, Take 1 capsule by mouth 2 (two) times a day  , Disp: , Rfl:     Calcium Carb-Cholecalciferol (CALCIUM 600 + D PO), Take 1 tablet by mouth 2 (two) times a day, Disp: , Rfl:      Cranberry 250 MG TABS, Take by mouth, Disp: , Rfl:     Fesoterodine Fumarate ER (Toviaz) 8 MG TB24, Take 8 mg by mouth daily. Not taking  Indications: Urinary Incontinence, Disp: , Rfl:     furosemide (LASIX) 20 mg tablet, Take 1 tablet (20 mg total) by mouth daily as needed (wt gain), Disp: 30 tablet, Rfl: 0    hydrocortisone 2.5 % cream, Apply topically 2 (two) times a day, Disp: 28 g, Rfl: 1    levothyroxine 50 mcg tablet, TAKE 1 TABLET BY MOUTH EVERY DAY, Disp: 90 tablet, Rfl: 1    methocarbamol (ROBAXIN) 500 mg tablet, Take 1 tablet (500 mg total) by mouth 3 (three) times a day, Disp: 30 tablet, Rfl: 0    Myrbetriq 50 MG TB24, Take 1 tablet by mouth daily, Disp: , Rfl:     ofloxacin (OCUFLOX) 0.3 % ophthalmic solution, 5 drops twice daily to left ear x 10 days., Disp: 10 mL, Rfl: 1    olmesartan (BENICAR) 5 mg tablet, TAKE 2 TABLETS BY MOUTH EVERY DAY, Disp: 180 tablet, Rfl: 3    Omega-3 Fatty Acids (Fish Oil) 300 MG CAPS, Take by mouth, Disp: , Rfl:     omeprazole (PriLOSEC) 40 MG capsule, TAKE 1 CAPSULE BY MOUTH TWICE A DAY, Disp: 180 capsule, Rfl: 1    oxygen gas, Inhale 2 L/min continuous. Indications: copd, Disp: , Rfl:     sertraline (ZOLOFT) 100 mg tablet, TAKE 1 TABLET BY MOUTH EVERY DAY, Disp: 90 tablet, Rfl: 0    simvastatin (ZOCOR) 40 mg tablet, TAKE 1 TABLET BY MOUTH EVERYDAY AT BEDTIME, Disp: 90 tablet, Rfl: 1    triamcinolone (KENALOG) 0.1 % cream, Apply topically 2 (two) times a day, Disp: 15 g, Rfl: 1    Turmeric (QC Tumeric Complex) 500 MG CAPS, Take by mouth, Disp: , Rfl:       Allergies:  Allergies   Allergen Reactions    Latex Rash    Neosporin [Neomycin-Bacitracin Zn-Polymyx] Rash and Other (See Comments)     hives per [facility] order       ECG: Reviewed by me.  2/5/2024.  Sinus rhythm with PACs. 59 bpm. LAD    Assessment and Plan:  1. Bradycardia  EKG done in the clinic reviewed with the patient.  Biotel monitor ordered for evaluation.    2. Severe aortic stenosis  3. S/P TAVR (transcatheter  aortic valve replacement)  Continue aspirin.  Patient is aware for need for antibiotic prophylaxis prior to dental work    4. Chronic diastolic (congestive) heart failure (HCC)  Euvolemic.  Furosemide as needed.  Continue olmesartan.  Beta-blocker was discontinued due to persistent shortness of breath and bradycardia.  Low-salt diet.  Daily weight    5. Essential hypertension  6. LVH (left ventricular hypertrophy)  BP normal.  Continue current medication.  Continue to monitor BP at home and call if abnormal    7. Mixed hyperlipidemia  Continue statin and diet control.  Her PCP closely monitors her blood work    8. Non-rheumatic mitral valve stenosis  Being followed with serial echocardiograms at recommended intervals    9. Pulmonary hypertension (HCC)  Follow-up with pulmonologist    10. Morbid obesity (HCC)  Try to lose weight    11. JANICE (obstructive sleep apnea)  Use CPAP regularly    Recommend aggressive risk factor modification and therapeutic lifestyle changes.  Low-salt, low-calorie, low-fat, low-cholesterol diet with regular exercise and to optimize weight.  I will defer the ordering and monitoring of necessity lab studies to you, but I am available and happy to review and manage any of the data at your request in the future.    Discussed concepts of atherosclerosis, including signs and symptoms of cardiac disease.    Previous studies were reviewed.    Safety measures were reviewed.  Questions were entertained and answered.  Patient was advised to report any problems requiring medical attention.    Follow-up with PCP and appropriate specialist and lab work as discussed.    Return for follow up visit as scheduled or earlier, if needed.  Thank you for allowing me to participate in the care and evaluation of your patient.  Should you have any questions, please feel free to contact me.      Bro Esteves MD  2/5/2024,2:54 PM

## 2024-02-05 NOTE — TELEPHONE ENCOUNTER
Spoke with Adrienne she did see oxygen in the home and Marisol uses it as needed but did not see a cpap machine in the house, she just would like us to clarify on Friday when she is here for her follow up to clarify how to use the oxygen or if we order a cpap.

## 2024-02-06 ENCOUNTER — TELEPHONE (OUTPATIENT)
Dept: CARDIOLOGY CLINIC | Facility: CLINIC | Age: 84
End: 2024-02-06

## 2024-02-06 ENCOUNTER — CLINICAL SUPPORT (OUTPATIENT)
Dept: CARDIOLOGY CLINIC | Facility: CLINIC | Age: 84
End: 2024-02-06
Payer: MEDICARE

## 2024-02-06 ENCOUNTER — TELEPHONE (OUTPATIENT)
Dept: FAMILY MEDICINE CLINIC | Facility: CLINIC | Age: 84
End: 2024-02-06

## 2024-02-06 DIAGNOSIS — E04.1 THYROID NODULE: Primary | ICD-10-CM

## 2024-02-06 DIAGNOSIS — R00.1 BRADYCARDIA: Primary | ICD-10-CM

## 2024-02-06 PROCEDURE — 99211 OFF/OP EST MAY X REQ PHY/QHP: CPT

## 2024-02-06 NOTE — TELEPHONE ENCOUNTER
Viola from [facility] - [phone] option #1 contacting office with urgent EKG on this patient.     Attempted to contact MA's all are rooming patients.

## 2024-02-06 NOTE — PROGRESS NOTES
Pt was in the office today for a nurse visit, [employer] monitor check.    Pt called and stated that her monitor keeps beeping. Pt was advised to cone in to have it check.    Pt's monitor was not charged.  Monitor was charged and replaced on pt's chest successfully.    No order was placed.  Order was placed and pended to Dr. Esteves.

## 2024-02-06 NOTE — TELEPHONE ENCOUNTER
CORRY    Pt called and call was transferred from [facility].    Spoke with pt and she stated that she took her sensor off of the patch because it was beeping and it fell on the floor.    Pt replaced the sensor on to the patch and said that the beeping stopped

## 2024-02-07 ENCOUNTER — TELEPHONE (OUTPATIENT)
Dept: CARDIOLOGY CLINIC | Facility: CLINIC | Age: 84
End: 2024-02-07

## 2024-02-07 ENCOUNTER — TELEPHONE (OUTPATIENT)
Dept: FAMILY MEDICINE CLINIC | Facility: CLINIC | Age: 84
End: 2024-02-07

## 2024-02-07 NOTE — TELEPHONE ENCOUNTER
Adrienne, from [facility] called and is requesting a call back to discuss pt's office visit from 2/5/24 with Dr. Esteves.       Please call Adrienne at [phone]
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
## 2024-02-08 NOTE — TELEPHONE ENCOUNTER
ECG strips reviewed. No significant pauses. Two junctional beats noted. Nothing to do at this time. Will await full report when monitor is completed.

## 2024-02-09 ENCOUNTER — TELEPHONE (OUTPATIENT)
Dept: CARDIOLOGY CLINIC | Facility: CLINIC | Age: 84
End: 2024-02-09

## 2024-02-09 ENCOUNTER — OFFICE VISIT (OUTPATIENT)
Dept: FAMILY MEDICINE CLINIC | Facility: CLINIC | Age: 84
End: 2024-02-09
Payer: MEDICARE

## 2024-02-09 VITALS
HEIGHT: 55 IN | BODY MASS INDEX: 42.58 KG/M2 | OXYGEN SATURATION: 97 % | DIASTOLIC BLOOD PRESSURE: 80 MMHG | HEART RATE: 35 BPM | WEIGHT: 184 LBS | SYSTOLIC BLOOD PRESSURE: 114 MMHG

## 2024-02-09 DIAGNOSIS — N18.31 STAGE 3A CHRONIC KIDNEY DISEASE (HCC): ICD-10-CM

## 2024-02-09 DIAGNOSIS — I48.0 PAROXYSMAL ATRIAL FIBRILLATION (HCC): Primary | ICD-10-CM

## 2024-02-09 DIAGNOSIS — Z95.2 S/P TAVR (TRANSCATHETER AORTIC VALVE REPLACEMENT): ICD-10-CM

## 2024-02-09 DIAGNOSIS — I50.32 CHRONIC DIASTOLIC (CONGESTIVE) HEART FAILURE (HCC): ICD-10-CM

## 2024-02-09 DIAGNOSIS — R26.2 AMBULATORY DYSFUNCTION: Primary | ICD-10-CM

## 2024-02-09 DIAGNOSIS — I27.20 PULMONARY HYPERTENSION (HCC): Chronic | ICD-10-CM

## 2024-02-09 DIAGNOSIS — I49.9 IRREGULAR HEART RATE: Primary | ICD-10-CM

## 2024-02-09 DIAGNOSIS — E66.01 OBESITY, MORBID (HCC): Chronic | ICD-10-CM

## 2024-02-09 PROCEDURE — 99214 OFFICE O/P EST MOD 30 MIN: CPT | Performed by: NURSE PRACTITIONER

## 2024-02-09 NOTE — ASSESSMENT & PLAN NOTE
Wt Readings from Last 3 Encounters:   02/09/24 83.5 kg (184 lb)   02/05/24 81.6 kg (180 lb)   01/29/24 83 kg (183 lb)     Continue care with cardiology, furosemide as needed and daily weights.

## 2024-02-09 NOTE — TELEPHONE ENCOUNTER
[facility] Urgent EKG  Auto-triggered 2/7/2024 at 11:56 P.M  New onset of A-Fib   bpm    EKG strip uploaded.

## 2024-02-09 NOTE — PROGRESS NOTES
Name: Marisol Otoole      : 1940      MRN: [id number]  Encounter Provider: MABEL Hallman  Encounter Date: 2024   Encounter department: [facility] [address] [place] [address]    Assessment & Plan     1. Irregular heart rate  Comments:  Currently on cardiac monitor, following with cardiology closely. Advised ER if symptomatic.    2. Chronic diastolic (congestive) heart failure (HCC)  Assessment & Plan:  Wt Readings from Last 3 Encounters:   24 83.5 kg (184 lb)   24 81.6 kg (180 lb)   24 83 kg (183 lb)     Continue care with cardiology, furosemide as needed and daily weights.            3. Pulmonary hypertension (HCC)    4. Stage 3a chronic kidney disease (HCC)    5. Obesity, morbid (HCC)    6. S/P TAVR (transcatheter aortic valve replacement)           Subjective      Patient presents for ER f/u.  She was seen in the ER in  for concerns of bradycardia.  Patient's physical therapist called the office and stated that her heart rate was in the 30s.  She stated she took this manually, not with an oximeter.  Patient did follow-up with her cardiologist and is wearing a cardiac monitor currently.  It does appear that she has had some episodes of bradycardia in the 30s and also some atrial fibrillation.  Patient will follow-up with cardiologist for this.  Patient is status post TAVR in 2018.  She states she feels well, other than fatigued and depressed because she cannot drive.      Review of Systems   Constitutional:  Positive for fatigue. Negative for chills, diaphoresis and fever.   HENT:  Negative for ear pain and sore throat.    Respiratory:  Negative for cough and shortness of breath.    Cardiovascular:  Negative for chest pain and palpitations.   Gastrointestinal:  Negative for abdominal pain and vomiting.   Genitourinary:  Negative for dysuria and hematuria.   Musculoskeletal:  Negative for arthralgias and back pain.   Skin:  Negative for color change  and rash.   Neurological:  Negative for seizures and syncope.   Psychiatric/Behavioral:  Positive for dysphoric mood and sleep disturbance.    All other systems reviewed and are negative.      Current Outpatient Medications on File Prior to Visit   Medication Sig    acetaminophen (TYLENOL) 500 mg tablet Take 500 mg by mouth every 6 (six) hours as needed    aspirin (ECOTRIN LOW STRENGTH) 81 mg EC tablet Take 1 tablet (81 mg total) by mouth daily    b complex vitamins capsule Take 1 capsule by mouth 2 (two) times a day      Calcium Carb-Cholecalciferol (CALCIUM 600 + D PO) Take 1 tablet by mouth 2 (two) times a day    Cranberry 250 MG TABS Take by mouth    Fesoterodine Fumarate ER (Toviaz) 8 MG TB24 Take 8 mg by mouth daily. Not taking  Indications: Urinary Incontinence    furosemide (LASIX) 20 mg tablet Take 1 tablet (20 mg total) by mouth daily as needed (wt gain)    hydrocortisone 2.5 % cream Apply topically 2 (two) times a day    levothyroxine 50 mcg tablet TAKE 1 TABLET BY MOUTH EVERY DAY    methocarbamol (ROBAXIN) 500 mg tablet Take 1 tablet (500 mg total) by mouth 3 (three) times a day    Myrbetriq 50 MG TB24 Take 1 tablet by mouth daily    ofloxacin (OCUFLOX) 0.3 % ophthalmic solution 5 drops twice daily to left ear x 10 days.    olmesartan (BENICAR) 5 mg tablet TAKE 2 TABLETS BY MOUTH EVERY DAY    Omega-3 Fatty Acids (Fish Oil) 300 MG CAPS Take by mouth    omeprazole (PriLOSEC) 40 MG capsule TAKE 1 CAPSULE BY MOUTH TWICE A DAY    oxygen gas Inhale 2 L/min continuous. Indications: copd    sertraline (ZOLOFT) 100 mg tablet TAKE 1 TABLET BY MOUTH EVERY DAY    simvastatin (ZOCOR) 40 mg tablet TAKE 1 TABLET BY MOUTH EVERYDAY AT BEDTIME    triamcinolone (KENALOG) 0.1 % cream Apply topically 2 (two) times a day    Turmeric (QC Tumeric Complex) 500 MG CAPS Take by mouth    [DISCONTINUED] cyclobenzaprine (FLEXERIL) 5 mg tablet Take 1 tablet (5 mg total) by mouth 2 (two) times a day as needed for muscle spasms     "[DISCONTINUED] meloxicam (Mobic) 15 mg tablet Take 1 tablet (15 mg total) by mouth daily       Objective     /80   Pulse (!) 35   Ht 4' 7\" (1.397 m)   Wt 83.5 kg (184 lb)   SpO2 97%   BMI 42.77 kg/m²     Physical Exam  Constitutional:       Appearance: She is well-developed. She is obese.   Cardiovascular:      Rate and Rhythm: Bradycardia present. Rhythm irregular.      Heart sounds: Normal heart sounds. No murmur heard.  Pulmonary:      Effort: Pulmonary effort is normal. No respiratory distress.      Breath sounds: Normal breath sounds.   Skin:     General: Skin is warm and dry.   Neurological:      Mental Status: She is alert and oriented to person, place, and time.       MABEL Hallman    "

## 2024-02-10 ENCOUNTER — NURSE TRIAGE (OUTPATIENT)
Dept: OTHER | Facility: OTHER | Age: 84
End: 2024-02-10

## 2024-02-10 RX ORDER — WARFARIN SODIUM 5 MG/1
TABLET ORAL
Qty: 30 TABLET | Refills: 0 | Status: SHIPPED | OUTPATIENT
Start: 2024-02-10

## 2024-02-10 NOTE — TELEPHONE ENCOUNTER
"Regarding: medication advice  ----- Message from Laura Mcgill sent at 2/10/2024  2:23 PM EST -----  \"I want to make sure that I can take my medication later today warfarin (Coumadin).\"    "

## 2024-02-10 NOTE — TELEPHONE ENCOUNTER
"Reason for Disposition  • Caller has medicine question only, adult not sick, AND triager answers question    Answer Assessment - Initial Assessment Questions  1. NAME of MEDICATION: \"What medicine are you calling about?\"      Warfarin (Coumadin)    2. QUESTION: \"What is your question?\" (e.g., medication refill, side effect)      Pt would like to know if there is a specific time she needs to take her Coumadin?     3. PRESCRIBING HCP: \"Who prescribed it?\" Reason: if prescribed by specialist, call should be referred to that group.     Mary Negrete    Protocols used: Medication Question Call-ADULT-AH      Pt states that one of her friends is going to be picking up her Coumadin but wont be going until later and was not sure if this was ok? Pt uncertain at what time her will be picking up the medication.     Reviewed Mary MONROE's instructions which states pt can take warfarin 10 mg today and 10 mg again tomorrow. No time specified but advised pt to take as soon as medication is dropped off. Asked pt if she can call back her friend to clarify time medication will be dropped off or if she has someone else that can pick medication up. Pt will try calling back her friend and will call back if there is any issue with obtaining medication.     "

## 2024-02-12 ENCOUNTER — TELEPHONE (OUTPATIENT)
Dept: CARDIOLOGY CLINIC | Facility: CLINIC | Age: 84
End: 2024-02-12

## 2024-02-12 ENCOUNTER — PATIENT OUTREACH (OUTPATIENT)
Dept: FAMILY MEDICINE CLINIC | Facility: CLINIC | Age: 84
End: 2024-02-12

## 2024-02-12 ENCOUNTER — ANTICOAG VISIT (OUTPATIENT)
Dept: CARDIOLOGY CLINIC | Facility: CLINIC | Age: 84
End: 2024-02-12

## 2024-02-12 DIAGNOSIS — F41.8 DEPRESSION WITH ANXIETY: ICD-10-CM

## 2024-02-12 LAB — INR PPP: 2 (ref 0.84–1.19)

## 2024-02-12 RX ORDER — SERTRALINE HYDROCHLORIDE 100 MG/1
TABLET, FILM COATED ORAL
Qty: 30 TABLET | Refills: 2 | Status: SHIPPED | OUTPATIENT
Start: 2024-02-12

## 2024-02-12 NOTE — TELEPHONE ENCOUNTER
----- Message from MABEL Esquivel sent at 2/10/2024 11:39 AM EST -----  Regarding: aryt  Hello,  Patient is found to be in new onset atrial fibrillation.  Per discussion with Dr. Esteves please have her come in in 3 to 4 weeks for follow-up appointment.    Thank you

## 2024-02-12 NOTE — PROGRESS NOTES
Received referral from patients PCP MABEL Hallman requesting that [facility] outreach patient and assess for needs. Per chart review, patient receives PT and OT home services through [facility]. PCP faxed new orders for MSW and nursing through [facility] as well on 02/09/2024.    SWCM spoke to patient, introduced role and reason for call. Patient reported doing well. Stated that she is receiving services at home and had nurse home visit today. Patient is set up with [employer] for transportation. Patient declined [facility] assistance and resources at this time. SWCM encouraged her to reach out if future needs arise, patient understood and thankful for call.

## 2024-02-12 NOTE — PROGRESS NOTES
Was dc'd on 10mg. INR today was 2.0. S/w Ludmila from vna. Advised to cont 10mg and recheck Thurs. She will do q Mon/Thurs

## 2024-02-14 ENCOUNTER — TELEPHONE (OUTPATIENT)
Dept: FAMILY MEDICINE CLINIC | Facility: CLINIC | Age: 84
End: 2024-02-14

## 2024-02-14 NOTE — TELEPHONE ENCOUNTER
Adrienne, PT, from [facility] called and left message on Clinical line. Just wanted to give provider an update on the patient. Stated that the patient had an irregular heart rate, still at 57 beats per minute, taken apically at today's PT visit. She's not complaining of anything associated with the low heart rate. She told Adrienne that she feels tired but this isn't unusual for her. No symptoms, but some bradycardia. Patient Mentioned that the 57 was actually better than what is was with them before. Is taking Coumadin prescribed by her heart doctor and this may have helped with the heart rate.     They Just wanted to update provider on the out of parameter reading as it usually is  beats per minute, so Adrienne wanted to see if there had to be any changes to the parameter or just keep it as is and let Diya know whenever it is out of parameter at PT visits.     Called Adrienne and let her know we received the message. She said she had just finished with patient and was doing okay. Did a 6 minute walking test and heart rate went up to 60 beats. Did mention to her that Diya was off the rest of the week but would put through a general message from clinical team to see.

## 2024-02-14 NOTE — TELEPHONE ENCOUNTER
Called  Adrienne and advised next time this happens to call her cardiologist just to make them aware and see what there advise is

## 2024-02-15 ENCOUNTER — ANTICOAG VISIT (OUTPATIENT)
Dept: CARDIOLOGY CLINIC | Facility: CLINIC | Age: 84
End: 2024-02-15

## 2024-02-15 ENCOUNTER — TELEPHONE (OUTPATIENT)
Dept: CARDIOLOGY CLINIC | Facility: CLINIC | Age: 84
End: 2024-02-15

## 2024-02-15 LAB — INR PPP: 6.6 (ref 0.84–1.19)

## 2024-02-17 DIAGNOSIS — I48.0 PAROXYSMAL ATRIAL FIBRILLATION (HCC): ICD-10-CM

## 2024-02-19 DIAGNOSIS — E03.9 ACQUIRED HYPOTHYROIDISM: ICD-10-CM

## 2024-02-19 RX ORDER — LEVOTHYROXINE SODIUM 0.05 MG/1
50 TABLET ORAL DAILY
Qty: 90 TABLET | Refills: 1 | Status: SHIPPED | OUTPATIENT
Start: 2024-02-19

## 2024-02-19 RX ORDER — WARFARIN SODIUM 5 MG/1
TABLET ORAL
Qty: 180 TABLET | Refills: 3 | Status: SHIPPED | OUTPATIENT
Start: 2024-02-19 | End: 2024-02-22

## 2024-02-20 ENCOUNTER — TELEPHONE (OUTPATIENT)
Dept: CARDIOLOGY CLINIC | Facility: CLINIC | Age: 84
End: 2024-02-20

## 2024-02-20 NOTE — TELEPHONE ENCOUNTER
Spoke with Adrienne from [facility] regarding pt HR.    Adrienne stated that pt HR is irregular, pt HR today is 44-53.     Patient has been very sleepy and Fatigue.     No other symptoms.    Adrienne wanted pt Cardiologist to be aware.     Adrienne can be reached at [phone].    Please advise

## 2024-02-20 NOTE — TELEPHONE ENCOUNTER
Adrienne, from [facility] called & stated that pt's resting heart rate is 44 and 53 beats per minute.  Pt is feeling very tired and sleepy    Call transferred to Sabiha Deleon can be reached at [phone]

## 2024-02-22 ENCOUNTER — TELEPHONE (OUTPATIENT)
Dept: CARDIOLOGY CLINIC | Facility: CLINIC | Age: 84
End: 2024-02-22

## 2024-02-22 ENCOUNTER — CLINICAL SUPPORT (OUTPATIENT)
Dept: CARDIOLOGY CLINIC | Facility: CLINIC | Age: 84
End: 2024-02-22
Payer: MEDICARE

## 2024-02-22 ENCOUNTER — ANTICOAG VISIT (OUTPATIENT)
Dept: CARDIOLOGY CLINIC | Facility: CLINIC | Age: 84
End: 2024-02-22

## 2024-02-22 DIAGNOSIS — R00.1 BRADYCARDIA: ICD-10-CM

## 2024-02-22 DIAGNOSIS — I48.0 PAROXYSMAL ATRIAL FIBRILLATION (HCC): ICD-10-CM

## 2024-02-22 LAB — INR PPP: 3.7 (ref 0.84–1.19)

## 2024-02-22 PROCEDURE — 93228 REMOTE 30 DAY ECG REV/REPORT: CPT | Performed by: INTERNAL MEDICINE

## 2024-02-22 RX ORDER — WARFARIN SODIUM 5 MG/1
TABLET ORAL
Qty: 180 TABLET | Refills: 3 | Status: SHIPPED | OUTPATIENT
Start: 2024-02-22

## 2024-02-22 NOTE — TELEPHONE ENCOUNTER
----- Message from Jie Patricia MD sent at 2/22/2024  3:26 PM EST -----  Please let patient know that her heart rate slows down when she sleeps  She should let us know if she has any symptoms  Will discuss more when she sees Dr Esteves in office in a couple weeks

## 2024-02-22 NOTE — TELEPHONE ENCOUNTER
Emery Barkley is an extremely pleasant 50 year old year old male patient here today to recheck warts. He notes wart recurrent on right index finger, has been treated a few times with liquid nitrogen and candin. He notes notes wart recurrent on left nare, he reports neck has been clear. He also notes swellling on right shoulder, present for a few months.  Patient has no other skin complaints today.  Remainder of the HPI, Meds, PMH, Allergies, FH, and SH was reviewed in chart.   Past Medical History:   Diagnosis Date     Hyperlipidemia        Past Surgical History:   Procedure Laterality Date     ABDOMEN SURGERY  2/7/19    umbilical hernia     HERNIORRHAPHY UMBILICAL N/A 2/7/2019    Procedure: HERNIORRHAPHY UMBILICAL;  Surgeon: Tej Beaulieu DO;  Location: [facility] OR     VASECTOMY          Family History   Problem Relation Age of Onset     C.A.D. Father         MI, chf, first mi in 50s.     Coronary Artery Disease Father      Hyperlipidemia Father      Cancer Sister         brain     Other Cancer Sister         Brain Cancer     Lipids Mother      Hyperlipidemia Mother      Depression Mother        Social History     Socioeconomic History     Marital status:      Spouse name: Not on file     Number of children: Not on file     Years of education: Not on file     Highest education level: Not on file   Occupational History     Not on file   Tobacco Use     Smoking status: Never     Smokeless tobacco: Never   Vaping Use     Vaping Use: Never used   Substance and Sexual Activity     Alcohol use: Yes     Comment: 2 drinks per week     Drug use: No     Sexual activity: Yes     Partners: Female     Birth control/protection: Male Surgical   Other Topics Concern     Parent/sibling w/ CABG, MI or angioplasty before 65F 55M? Yes   Social History Narrative     Not on file     Social Determinants of Health     Financial Resource Strain: Not on file   Food Insecurity: Not on file   Transportation Needs: Not on file  Please advise patient to hold today and tomorrow dose.   She should then take 2.5 mg on Sat and Sunday and 5 mg from Monday to Friday  Recheck INR in one week  Thanks     Physical Activity: Not on file   Stress: Not on file   Social Connections: Not on file   Intimate Partner Violence: Not on file   Housing Stability: Not on file       Outpatient Encounter Medications as of 12/9/2022   Medication Sig Dispense Refill     atorvastatin (LIPITOR) 20 MG tablet Take 1 tablet (20 mg) by mouth daily 90 tablet 3     cimetidine (TAGAMET) 800 MG tablet Take 1 tablet (800 mg) by mouth At Bedtime 30 tablet 0     loratadine (CLARITIN) 10 MG tablet Take 10 mg by mouth daily as needed for allergies       No facility-administered encounter medications on file as of 12/9/2022.             O:   NAD, WDWN, Alert & Oriented, Mood & Affect wnl, Vitals stable   Here today alone   There were no vitals taken for this visit.   General appearance normal   Vitals stable   Alert, oriented and in no acute distress    Verrucous papule on right index finger  Verrucous papule on left nare    Eyes: Conjunctivae/lids:Normal     ENT: Lips normal    MSK:Normal    Cardiovascular: peripheral edema none    Pulm: Breathing Normal    Neuro/Psych: Orientation:Alert and Orientedx3 ; Mood/Affect:normal   A/P:  1. Wart on right index finger   Resolved.   2. Common wart on left nare x 2  LN2:  Treated with LN2 for 5s for 1-2 cycles. Warned risks of blistering, pain, pigment change, scarring, and incomplete resolution.  Advised patient to return if lesions do not completely resolve.  Wound care sheet given.    2. Lipoma on right shoulder   Can schedule for excision with Dr. Nicholas.

## 2024-02-22 NOTE — TELEPHONE ENCOUNTER
Fax received from [facility] requesting provider's signature on warfarin/O2 orders.    Forms completed, faxed to [facility], and scanned into pt's chart

## 2024-02-22 NOTE — TELEPHONE ENCOUNTER
Cristin from [facility] called with INR results.    3.7    Cristin - [phone]    [facility] [phone]

## 2024-02-22 NOTE — TELEPHONE ENCOUNTER
Dr. Esteves pt    Out of range inr  Range is 2-3  Inr is 3.7  Last inr 6.6  Taking 5 mg daily      Please send back to hammad. Thanks.

## 2024-02-22 NOTE — PROGRESS NOTES
Please advise patient to hold today and tomorrow dose.   She should then take 2.5 mg on Sat and Sunday and 5 mg from Monday to Friday  Recheck INR in one week       Per dr. Patricia pt understood.

## 2024-02-23 NOTE — TELEPHONE ENCOUNTER
Fax received from  of [facility]  P: [phone]  F:[phone]    Form scanned and placed in Dr. Esteves's folder.

## 2024-02-26 ENCOUNTER — TELEPHONE (OUTPATIENT)
Dept: CARDIOLOGY CLINIC | Facility: CLINIC | Age: 84
End: 2024-02-26

## 2024-02-26 ENCOUNTER — ANTICOAG VISIT (OUTPATIENT)
Dept: CARDIOLOGY CLINIC | Facility: CLINIC | Age: 84
End: 2024-02-26

## 2024-02-26 LAB — INR PPP: 1.9 (ref 0.84–1.19)

## 2024-02-26 NOTE — TELEPHONE ENCOUNTER
S/w Ludmila who was with the pt. Advised sd and retest in Thurs. Has vna q Mon/Thurs until therapeutic.

## 2024-02-26 NOTE — TELEPHONE ENCOUNTER
Brianda from [facility] called to report pt's PT INR.     PT INR for pt reported at a 1.9     Brianda can be reached at [phone]

## 2024-02-28 NOTE — TELEPHONE ENCOUNTER
Fax received from [facility] of [facility]  P:[phone]  F:[phone]    Physician order form with INR instructions.  Form scanned in pts chart and placed in Dr. Esteves's folder.

## 2024-02-29 ENCOUNTER — TELEPHONE (OUTPATIENT)
Dept: CARDIOLOGY CLINIC | Facility: CLINIC | Age: 84
End: 2024-02-29

## 2024-02-29 ENCOUNTER — ANTICOAG VISIT (OUTPATIENT)
Dept: CARDIOLOGY CLINIC | Facility: CLINIC | Age: 84
End: 2024-02-29

## 2024-02-29 ENCOUNTER — HOSPITAL ENCOUNTER (OUTPATIENT)
Dept: ULTRASOUND IMAGING | Facility: HOSPITAL | Age: 84
End: 2024-02-29
Payer: MEDICARE

## 2024-02-29 DIAGNOSIS — E04.1 THYROID NODULE: ICD-10-CM

## 2024-02-29 LAB — INR PPP: 3.1 (ref 0.84–1.19)

## 2024-02-29 PROCEDURE — 76536 US EXAM OF HEAD AND NECK: CPT

## 2024-02-29 NOTE — PROGRESS NOTES
S/w Ludmila who was with the pt. Advised 2.5mg Fri-Sun, 5mg the rest and retest in 1 week. Will need mobile lab after 3/7

## 2024-02-29 NOTE — TELEPHONE ENCOUNTER
Brianda, called to report pt's PT INR taken for today reported at a 3.1. Pt is following correct dosage she is suppose to take.      Brianda can be reached at [phone]

## 2024-03-01 ENCOUNTER — TELEPHONE (OUTPATIENT)
Dept: CARDIOLOGY CLINIC | Facility: CLINIC | Age: 84
End: 2024-03-01

## 2024-03-01 NOTE — TELEPHONE ENCOUNTER
Adrienne from [facility] called. Patient's heart rate resting 47.     Transferred call to [facility] -

## 2024-03-01 NOTE — TELEPHONE ENCOUNTER
Adrienne from [facility] called and states patient  heart rate is 47 WHEN RESTING .     she had patient walk around an HR WENT UP TO 62 .    Just an FYI Adrienne stated

## 2024-03-04 ENCOUNTER — RA CDI HCC (OUTPATIENT)
Dept: OTHER | Facility: HOSPITAL | Age: 84
End: 2024-03-04

## 2024-03-04 ENCOUNTER — OFFICE VISIT (OUTPATIENT)
Dept: FAMILY MEDICINE CLINIC | Facility: CLINIC | Age: 84
End: 2024-03-04
Payer: MEDICARE

## 2024-03-04 VITALS
SYSTOLIC BLOOD PRESSURE: 110 MMHG | HEART RATE: 48 BPM | BODY MASS INDEX: 42.77 KG/M2 | OXYGEN SATURATION: 95 % | HEIGHT: 55 IN | DIASTOLIC BLOOD PRESSURE: 50 MMHG

## 2024-03-04 DIAGNOSIS — Z95.2 S/P TAVR (TRANSCATHETER AORTIC VALVE REPLACEMENT): ICD-10-CM

## 2024-03-04 DIAGNOSIS — I44.30 AVB (ATRIOVENTRICULAR BLOCK): ICD-10-CM

## 2024-03-04 DIAGNOSIS — M19.012 PRIMARY OSTEOARTHRITIS OF LEFT SHOULDER: Primary | ICD-10-CM

## 2024-03-04 PROCEDURE — 99214 OFFICE O/P EST MOD 30 MIN: CPT | Performed by: NURSE PRACTITIONER

## 2024-03-04 PROCEDURE — G2211 COMPLEX E/M VISIT ADD ON: HCPCS | Performed by: NURSE PRACTITIONER

## 2024-03-04 NOTE — PROGRESS NOTES
Name: Marisol Otoole      : 1940      MRN: [id number]  Encounter Provider: MABEL Hallman  Encounter Date: 3/4/2024   Encounter department: [facility] [address] [facility]    Assessment & Plan     1. Primary osteoarthritis of left shoulder  Assessment & Plan:  Continue PT at home.       2. AVB (atrioventricular block)  Assessment & Plan:  Continue care with cardiology.  Likely will be going for pacemaker.  Advised she is not to drive until this issue is resolved and her heart rate is better regulated.      3. S/P TAVR (transcatheter aortic valve replacement)           Subjective      Patient presents for follow up. Continues with left shoulder pain, some improvement with PT at home. Much improvement with the right shoulder with injections. She is seeing Cardiology this Friday for follow up on bradycardia and consider pacer.  She is very depressed that she is not allowed to drive right now due to her cardiac issues.      Review of Systems   Constitutional:  Positive for fatigue. Negative for chills, diaphoresis and fever.   HENT:  Negative for ear pain and sore throat.    Eyes:  Negative for pain and visual disturbance.   Respiratory:  Negative for cough and shortness of breath.    Cardiovascular:  Negative for chest pain and palpitations.   Gastrointestinal:  Positive for constipation. Negative for abdominal pain, diarrhea, nausea and vomiting.   Genitourinary:  Negative for dysuria and hematuria.   Musculoskeletal:  Positive for arthralgias. Negative for back pain.   Skin:  Negative for color change.   Neurological:  Negative for dizziness, seizures, syncope and light-headedness.   Psychiatric/Behavioral:  Positive for dysphoric mood.    All other systems reviewed and are negative.      Current Outpatient Medications on File Prior to Visit   Medication Sig    acetaminophen (TYLENOL) 500 mg tablet Take 500 mg by mouth every 6 (six) hours as needed    aspirin (ECOTRIN LOW  "STRENGTH) 81 mg EC tablet Take 1 tablet (81 mg total) by mouth daily    b complex vitamins capsule Take 1 capsule by mouth 2 (two) times a day      Calcium Carb-Cholecalciferol (CALCIUM 600 + D PO) Take 1 tablet by mouth 2 (two) times a day    Cranberry 250 MG TABS Take by mouth    Fesoterodine Fumarate ER (Toviaz) 8 MG TB24 Take 8 mg by mouth daily. Not taking  Indications: Urinary Incontinence    furosemide (LASIX) 20 mg tablet Take 1 tablet (20 mg total) by mouth daily as needed (wt gain)    hydrocortisone 2.5 % cream Apply topically 2 (two) times a day    levothyroxine 50 mcg tablet Take 1 tablet (50 mcg total) by mouth daily    methocarbamol (ROBAXIN) 500 mg tablet Take 1 tablet (500 mg total) by mouth 3 (three) times a day    Myrbetriq 50 MG TB24 Take 1 tablet by mouth daily    ofloxacin (OCUFLOX) 0.3 % ophthalmic solution 5 drops twice daily to left ear x 10 days.    olmesartan (BENICAR) 5 mg tablet TAKE 2 TABLETS BY MOUTH EVERY DAY    Omega-3 Fatty Acids (Fish Oil) 300 MG CAPS Take by mouth    omeprazole (PriLOSEC) 40 MG capsule TAKE 1 CAPSULE BY MOUTH TWICE A DAY    oxygen gas Inhale 2 L/min continuous. Indications: copd    sertraline (ZOLOFT) 100 mg tablet TAKE 1 TABLET BY MOUTH EVERY DAY    simvastatin (ZOCOR) 40 mg tablet TAKE 1 TABLET BY MOUTH EVERYDAY AT BEDTIME    triamcinolone (KENALOG) 0.1 % cream Apply topically 2 (two) times a day    Turmeric (QC Tumeric Complex) 500 MG CAPS Take by mouth    warfarin (COUMADIN) 5 mg tablet TAKE 2 TABLETS (10 MG) TODAY AND TOMORROW    [DISCONTINUED] cyclobenzaprine (FLEXERIL) 5 mg tablet Take 1 tablet (5 mg total) by mouth 2 (two) times a day as needed for muscle spasms    [DISCONTINUED] meloxicam (Mobic) 15 mg tablet Take 1 tablet (15 mg total) by mouth daily       Objective     /50   Pulse (!) 48   Ht 4' 7\" (1.397 m)   SpO2 95%   BMI 42.77 kg/m²     Physical Exam  Constitutional:       Appearance: She is well-developed.   Cardiovascular:      Rate and " Rhythm: Normal rate and regular rhythm.      Heart sounds: Normal heart sounds. No murmur heard.  Pulmonary:      Effort: Pulmonary effort is normal. No respiratory distress.      Breath sounds: Normal breath sounds.   Skin:     General: Skin is warm and dry.   Neurological:      Mental Status: She is alert and oriented to person, place, and time.   Psychiatric:         Mood and Affect: Mood normal.       AMBEL Hallman

## 2024-03-04 NOTE — ASSESSMENT & PLAN NOTE
Continue care with cardiology.  Likely will be going for pacemaker.  Advised she is not to drive until this issue is resolved and her heart rate is better regulated.

## 2024-03-06 ENCOUNTER — HOSPITAL ENCOUNTER (EMERGENCY)
Facility: HOSPITAL | Age: 84
Discharge: HOME | End: 2024-03-06
Attending: EMERGENCY MEDICINE
Payer: MEDICARE

## 2024-03-06 ENCOUNTER — TELEPHONE (OUTPATIENT)
Dept: FAMILY MEDICINE CLINIC | Facility: CLINIC | Age: 84
End: 2024-03-06

## 2024-03-06 VITALS
RESPIRATION RATE: 18 BRPM | HEART RATE: 68 BPM | BODY MASS INDEX: 47.55 KG/M2 | SYSTOLIC BLOOD PRESSURE: 160 MMHG | WEIGHT: 204.59 LBS | TEMPERATURE: 97.7 F | DIASTOLIC BLOOD PRESSURE: 72 MMHG | OXYGEN SATURATION: 93 %

## 2024-03-06 DIAGNOSIS — R04.0 RIGHT-SIDED EPISTAXIS: Primary | ICD-10-CM

## 2024-03-06 PROCEDURE — 99283 EMERGENCY DEPT VISIT LOW MDM: CPT

## 2024-03-06 PROCEDURE — 99284 EMERGENCY DEPT VISIT MOD MDM: CPT

## 2024-03-06 RX ORDER — OXYMETAZOLINE HYDROCHLORIDE 0.05 G/100ML
2 SPRAY NASAL ONCE
Status: COMPLETED | OUTPATIENT
Start: 2024-03-06 | End: 2024-03-06

## 2024-03-06 RX ORDER — TRANEXAMIC ACID 100 MG/ML
500 INJECTION, SOLUTION INTRAVENOUS ONCE
Status: COMPLETED | OUTPATIENT
Start: 2024-03-06 | End: 2024-03-06

## 2024-03-06 RX ADMIN — TRANEXAMIC ACID 500 MG: 1 INJECTION, SOLUTION INTRAVENOUS at 08:56

## 2024-03-06 RX ADMIN — OXYMETAZOLINE HYDROCHLORIDE 2 SPRAY: 0.05 SPRAY NASAL at 08:56

## 2024-03-06 NOTE — ED PROVIDER NOTES
History  Chief Complaint   Patient presents with    Nose Bleed     Pt BIB EMS from home for a nosebleed that started around 0630. Pt takes Coumadin. Appears to have stopped now      Patient is an 83-year-old female with a past medical history of AV block, COPD, CHF, GERD, hyperlipidemia, hypertension, hypothyroidism, aortic stenosis, anxiety, arthritis, cataracts presenting to the emergency department for evaluation of a nosebleed.  Patient states she began having nosebleed this morning around 6:30 AM.  Patient states she does take Coumadin.  Patient states she did have a large amount of bleeding this morning but states it is since slowed up.  Patient states she previously has had nosebleeds but reports she has not had one in quite some time.  Patient states she was recently started on Coumadin.  Patient states she is only having bleeding out of the right nare.  Patient offers no other concerns or complaints at this time.        Prior to Admission Medications   Prescriptions Last Dose Informant Patient Reported? Taking?   Calcium Carb-Cholecalciferol (CALCIUM 600 + D PO)  Self Yes No   Sig: Take 1 tablet by mouth 2 (two) times a day   Cranberry 250 MG TABS  Self Yes No   Sig: Take by mouth   Fesoterodine Fumarate ER (Toviaz) 8 MG TB24  Self Yes No   Sig: Take 8 mg by mouth daily. Not taking  Indications: Urinary Incontinence   Myrbetriq 50 MG TB24  Self Yes No   Sig: Take 1 tablet by mouth daily   Omega-3 Fatty Acids (Fish Oil) 300 MG CAPS  Self Yes No   Sig: Take by mouth   Turmeric (QC Tumeric Complex) 500 MG CAPS  Self Yes No   Sig: Take by mouth   acetaminophen (TYLENOL) 500 mg tablet  Self Yes No   Sig: Take 500 mg by mouth every 6 (six) hours as needed   aspirin (ECOTRIN LOW STRENGTH) 81 mg EC tablet  Self No No   Sig: Take 1 tablet (81 mg total) by mouth daily   b complex vitamins capsule  Self Yes No   Sig: Take 1 capsule by mouth 2 (two) times a day     furosemide (LASIX) 20 mg tablet  Self No No   Sig:  Take 1 tablet (20 mg total) by mouth daily as needed (wt gain)   hydrocortisone 2.5 % cream  Self No No   Sig: Apply topically 2 (two) times a day   levothyroxine 50 mcg tablet   No No   Sig: Take 1 tablet (50 mcg total) by mouth daily   methocarbamol (ROBAXIN) 500 mg tablet  Self No No   Sig: Take 1 tablet (500 mg total) by mouth 3 (three) times a day   ofloxacin (OCUFLOX) 0.3 % ophthalmic solution  Self No No   Si drops twice daily to left ear x 10 days.   olmesartan (BENICAR) 5 mg tablet  Self No No   Sig: TAKE 2 TABLETS BY MOUTH EVERY DAY   omeprazole (PriLOSEC) 40 MG capsule  Self No No   Sig: TAKE 1 CAPSULE BY MOUTH TWICE A DAY   oxygen gas  Self Yes No   Sig: Inhale 2 L/min continuous. Indications: copd   sertraline (ZOLOFT) 100 mg tablet   No No   Sig: TAKE 1 TABLET BY MOUTH EVERY DAY   simvastatin (ZOCOR) 40 mg tablet   No No   Sig: TAKE 1 TABLET BY MOUTH EVERYDAY AT BEDTIME   triamcinolone (KENALOG) 0.1 % cream  Self No No   Sig: Apply topically 2 (two) times a day   warfarin (COUMADIN) 5 mg tablet   No No   Sig: TAKE 2 TABLETS (10 MG) TODAY AND TOMORROW      Facility-Administered Medications: None       Past Medical History:   Diagnosis Date    Anemia 2018    Anxiety     Arthritis     AVB (atrioventricular block)     first degree    Cataract     CHF (congestive heart failure) ([facility])     COPD, mild ([facility])     Coronary artery disease     Dislocation of right shoulder joint     Frequent UTI     GERD (gastroesophageal reflux disease)     H/O: pneumonia     Heme positive stool     Hyperlipidemia     Hypertension     Hypothyroidism     Morbid obesity with BMI of 50.0-59.9, adult ([facility])     Obesity, morbid ([facility]) 2018    JANICE on CPAP     Pulmonary hypertension ([facility]) 2018    Severe aortic stenosis     Simple goiter     Skin cyst     within the armpits, right    Wears glasses        Past Surgical History:   Procedure Laterality Date    BREAST BIOPSY      CARDIAC CATHETERIZATION      CARPAL TUNNEL  RELEASE Bilateral     CHOLECYSTECTOMY      DILATION AND CURETTAGE OF UTERUS      HYSTEROSCOPY      MASTOID SURGERY      WI COLONOSCOPY FLX DX W/COLLJ SPEC WHEN PFRMD N/A 9/6/2018    Procedure: COLONOSCOPY;  Surgeon: Shree Sosa III, MD;  Location: [facility];  Service: Gastroenterology    WI ECHO TRANSESOPHAG R-T 2D W/PRB IMG ACQUISJ I&R N/A 10/9/2018    Procedure: INTRA-OP TRANSESOPHAGEAL ECHOCARDIOGRAM (GARRISON);  Surgeon: Kushal Camarena DO;  Location:  MAIN OR;  Service: Cardiac Surgery    WI ESOPHAGOGASTRODUODENOSCOPY TRANSORAL DIAGNOSTIC N/A 8/31/2018    Procedure: ESOPHAGOGASTRODUODENOSCOPY (EGD);  Surgeon: Shree Sosa III, MD;  Location: [facility];  Service: Gastroenterology    WI REPLACE AORTIC VALVE OPENFEMORAL ARTERY APPROACH N/A 10/9/2018    Procedure: REPLACEMENT AORTIC VALVE TRANSCATHETER (TAVR) TRANSFEMORAL W/ 23 MM MENDOZA NOE S3 VALVE (ACCESS OF LEFT);  Surgeon: Kushal Camarena DO;  Location: [facility];  Service: Cardiac Surgery    TOTAL HIP ARTHROPLASTY Left 2007    TOTAL KNEE ARTHROPLASTY Bilateral        Family History   Problem Relation Age of Onset    Diabetes Mother     Stroke Mother     Cancer Father     Lung cancer Father     Diabetes Sister     Heart disease Sister     Hypertension Sister     Coronary artery disease Family     Diabetes Family     Hypertension Family     Cancer Family     Stroke Family     Thyroid disease Neg Hx      I have reviewed and agree with the history as documented.    E-Cigarette/Vaping    E-Cigarette Use Never User      E-Cigarette/Vaping Substances    Nicotine No     THC No     CBD No     Flavoring No     Other No     Unknown No      Social History     Tobacco Use    Smoking status: Never    Smokeless tobacco: Never   Vaping Use    Vaping status: Never Used   Substance Use Topics    Alcohol use: No    Drug use: No       Review of Systems   Constitutional:  Negative for chills and fever.   HENT:  Positive for nosebleeds. Negative for ear pain and  sore throat.    Eyes:  Negative for pain and visual disturbance.   Respiratory:  Negative for cough and shortness of breath.    Cardiovascular:  Negative for chest pain and palpitations.   Gastrointestinal:  Negative for abdominal pain and vomiting.   Genitourinary:  Negative for dysuria and hematuria.   Musculoskeletal:  Negative for arthralgias and back pain.   Skin:  Negative for color change and rash.   Neurological:  Negative for seizures and syncope.   All other systems reviewed and are negative.      Physical Exam  Physical Exam  Vitals and nursing note reviewed.   Constitutional:       General: She is not in acute distress.     Appearance: Normal appearance. She is not ill-appearing, toxic-appearing or diaphoretic.   HENT:      Head: Normocephalic and atraumatic.      Right Ear: External ear normal.      Left Ear: External ear normal.      Nose: Nose normal.      Right Nostril: No septal hematoma or occlusion.      Comments: Small amount of bright red blood in the right nare.  No visualized site of active bleeding.     Mouth/Throat:      Mouth: Mucous membranes are moist.   Eyes:      General: No scleral icterus.        Right eye: No discharge.         Left eye: No discharge.      Conjunctiva/sclera: Conjunctivae normal.   Pulmonary:      Effort: Pulmonary effort is normal. No respiratory distress.   Musculoskeletal:         General: No swelling, deformity or signs of injury. Normal range of motion.      Cervical back: Normal range of motion and neck supple. No rigidity.   Skin:     General: Skin is warm and dry.      Coloration: Skin is not jaundiced.      Findings: No erythema or rash.   Neurological:      General: No focal deficit present.      Mental Status: She is alert and oriented to person, place, and time. Mental status is at baseline.      Cranial Nerves: No cranial nerve deficit.      Gait: Gait normal.   Psychiatric:         Mood and Affect: Mood normal.         Behavior: Behavior normal.          Thought Content: Thought content normal.         Judgment: Judgment normal.         Vital Signs  ED Triage Vitals [03/06/24 0843]   Temperature Pulse Respirations Blood Pressure SpO2   97.7 °F (36.5 °C) 68 18 160/72 93 %      Temp Source Heart Rate Source Patient Position - Orthostatic VS BP Location FiO2 (%)   Oral Monitor Lying Left arm --      Pain Score       No Pain           Vitals:    03/06/24 0843   BP: 160/72   Pulse: 68   Patient Position - Orthostatic VS: Lying         Visual Acuity      ED Medications  Medications   tranexamic acid 100mg/mL (for epistaxis) 500 mg (500 mg Nasal Given 3/6/24 0856)   oxymetazoline (AFRIN) 0.05 % nasal spray 2 spray (2 sprays Each Nare Given 3/6/24 0856)       Diagnostic Studies  Results Reviewed       None                   No orders to display              Procedures  Procedures         ED Course  ED Course as of 03/06/24 1409   Wed Mar 06, 2024   0951 Will observe for 10 minutes, TXA gauze was removed, bleeding has stopped               Identification of Seniors at Risk      Flowsheet Row Most Recent Value   (ISAR) Identification of Seniors at Risk    Before the illness or injury that brought you to the Emergency, did you need someone to help you on a regular basis? 1 Filed at: 03/06/2024 0846   In the last 24 hours, have you needed more help than usual? 0 Filed at: 03/06/2024 0846   Have you been hospitalized for one or more nights during the past 6 months? 1 Filed at: 03/06/2024 0846   In general, do you see well? 0 Filed at: 03/06/2024 0846   In general, do you have serious problems with your memory? 1 Filed at: 03/06/2024 0846   Do you take more than three different medications every day? 1 Filed at: 03/06/2024 0846   ISAR Score 4 Filed at: 03/06/2024 0846                        SBIRT 20yo+      Flowsheet Row Most Recent Value   Initial Alcohol Screen: US AUDIT-C     1. How often do you have a drink containing alcohol? 0 Filed at: 03/06/2024 0847   2. How many drinks  containing alcohol do you have on a typical day you are drinking?  0 Filed at: 03/06/2024 0847   3b. FEMALE Any Age, or MALE 65+: How often do you have 4 or more drinks on one occassion? 0 Filed at: 03/06/2024 0847   Audit-C Score 0 Filed at: 03/06/2024 0847   SHAWN: How many times in the past year have you...    Used an illegal drug or used a prescription medication for non-medical reasons? Never Filed at: 03/06/2024 0847                      Medical Decision Making    This is a 83-year-old female presenting to the emergency department for evaluation of epistaxis.  Patient states she began having nosebleed this morning around 6:30 AM.  Patient states she did have a large amount of bleeding this morning but states it is since slowed up.  Patient states she previously has had nosebleeds but reports she has not had one in quite some time.  Patient states she was recently started on Coumadin.  Patient states she is only having bleeding out of the right nare.  Patient offers no other concerns or complaints at this time. Patient is in no acute distress, stable vitals on initial examination.    Differential diagnosis to include but is not limited to: epistaxis    Initial ED Plan: afrin, TXA gauze    ED results:  Afrin and TXA soaked gauze were placed in the right nare. Patient was left to sit with the gauze in place for 10 minutes. Gauze was removed after 10 minutes and the bleeding has stopped.     Final ED assessment: Patient is stable and well appearing. Discussed follow up with PCP and ENT. Strict return precautions were discussed including but not limited to bleeding, weakness, dizziness. Patient verbalized understanding and is agreeable with the plan for discharge.     Risk  OTC drugs.  Prescription drug management.             Disposition  Final diagnoses:   Right-sided epistaxis     Time reflects when diagnosis was documented in both MDM as applicable and the Disposition within this note       Time User Action  Codes Description Comment    3/6/2024 10:19 AM Laura Del Toro Add [R04.0] Right-sided epistaxis           ED Disposition       ED Disposition   Discharge    Condition   Stable    Date/Time   Wed Mar 6, 2024 1019    Comment   Marisol Otoole discharge to home/self care.                   Follow-up Information       Follow up With Specialties Details Why Contact Info Additional Information    [facility] Call in 3 days For follow up [address] [facility] [address] [phone]       [facility] Emergency Medicine Go to  If symptoms worsen [address] [facility] [id number] [phone] [facility], [address] [place], [place], [address]    [facility] Call in 3 days For follow up [address] [facility] [address] [phone] [facility] [address] [facility] [place] [address]            Discharge Medication List as of 3/6/2024 10:20 AM        CONTINUE these medications which have NOT CHANGED    Details   acetaminophen (TYLENOL) 500 mg tablet Take 500 mg by mouth every 6 (six) hours as needed, Historical Med      aspirin (ECOTRIN LOW STRENGTH) 81 mg EC tablet Take 1 tablet (81 mg total) by mouth daily, Starting Thu 10/11/2018, Print      b complex vitamins capsule Take 1 capsule by mouth 2 (two) times a day  , Historical Med      Calcium Carb-Cholecalciferol (CALCIUM 600 + D PO) Take 1 tablet by mouth 2 (two) times a day, Historical Med      Cranberry 250 MG TABS Take by mouth, Historical Med      Fesoterodine Fumarate ER (Toviaz) 8 MG TB24 Take 8 mg by mouth daily. Not taking  Indications: Urinary Incontinence, Historical Med      furosemide (LASIX) 20 mg tablet Take 1 tablet (20 mg total) by mouth daily as needed (wt gain), Starting Fri 8/18/2023, No Print       hydrocortisone 2.5 % cream Apply topically 2 (two) times a day, Starting Wed 1/10/2024, Normal      levothyroxine 50 mcg tablet Take 1 tablet (50 mcg total) by mouth daily, Starting Mon 2/19/2024, Normal      methocarbamol (ROBAXIN) 500 mg tablet Take 1 tablet (500 mg total) by mouth 3 (three) times a day, Starting Tue 10/10/2023, Normal      Myrbetriq 50 MG TB24 Take 1 tablet by mouth daily, Starting Wed 5/31/2023, Historical Med      ofloxacin (OCUFLOX) 0.3 % ophthalmic solution 5 drops twice daily to left ear x 10 days., Normal      olmesartan (BENICAR) 5 mg tablet TAKE 2 TABLETS BY MOUTH EVERY DAY, Normal      Omega-3 Fatty Acids (Fish Oil) 300 MG CAPS Take by mouth, Historical Med      omeprazole (PriLOSEC) 40 MG capsule TAKE 1 CAPSULE BY MOUTH TWICE A DAY, Normal      oxygen gas Inhale 2 L/min continuous. Indications: copd, Historical Med      sertraline (ZOLOFT) 100 mg tablet TAKE 1 TABLET BY MOUTH EVERY DAY, Normal      simvastatin (ZOCOR) 40 mg tablet TAKE 1 TABLET BY MOUTH EVERYDAY AT BEDTIME, Normal      triamcinolone (KENALOG) 0.1 % cream Apply topically 2 (two) times a day, Starting Thu 1/25/2024, Normal      Turmeric (QC Tumeric Complex) 500 MG CAPS Take by mouth, Historical Med      warfarin (COUMADIN) 5 mg tablet TAKE 2 TABLETS (10 MG) TODAY AND TOMORROW, Normal             No discharge procedures on file.    PDMP Review         Value Time User    PDMP Reviewed  Yes 8/18/2023 11:22 AM Anjel Villar DO            ED Provider  Electronically Signed by             Laura Del Toro PA-C  03/06/24 2916

## 2024-03-06 NOTE — TELEPHONE ENCOUNTER
She went to the ER for a nose bleed. They were able to stop the bleeding. She just wanted to notify Diya.

## 2024-03-06 NOTE — DISCHARGE INSTRUCTIONS
Follow up with PCP  Follow up with ENT  Return to the ED with new or worsening symptoms including but not limited to continued bleeding, weakness

## 2024-03-06 NOTE — TELEPHONE ENCOUNTER
Adrienne from patient called letting Diya know that patient O2 levels were at 86-89 w/o O2 and 2 liters per minute. Patient used 02 when going up the ramp. Patient wants her to see pulmonology again. O2 was at 92-94 when she left

## 2024-03-07 ENCOUNTER — TELEPHONE (OUTPATIENT)
Dept: FAMILY MEDICINE CLINIC | Facility: CLINIC | Age: 84
End: 2024-03-07

## 2024-03-08 ENCOUNTER — TELEPHONE (OUTPATIENT)
Dept: CARDIOLOGY CLINIC | Facility: CLINIC | Age: 84
End: 2024-03-08

## 2024-03-08 ENCOUNTER — ANTICOAG VISIT (OUTPATIENT)
Dept: CARDIOLOGY CLINIC | Facility: CLINIC | Age: 84
End: 2024-03-08

## 2024-03-08 ENCOUNTER — OFFICE VISIT (OUTPATIENT)
Dept: CARDIOLOGY CLINIC | Facility: CLINIC | Age: 84
End: 2024-03-08
Payer: MEDICARE

## 2024-03-08 VITALS
OXYGEN SATURATION: 94 % | HEIGHT: 55 IN | BODY MASS INDEX: 42.58 KG/M2 | DIASTOLIC BLOOD PRESSURE: 80 MMHG | WEIGHT: 184 LBS | RESPIRATION RATE: 16 BRPM | HEART RATE: 67 BPM | SYSTOLIC BLOOD PRESSURE: 143 MMHG

## 2024-03-08 DIAGNOSIS — E66.01 MORBID OBESITY (HCC): ICD-10-CM

## 2024-03-08 DIAGNOSIS — I48.0 PAROXYSMAL ATRIAL FIBRILLATION (HCC): Primary | ICD-10-CM

## 2024-03-08 DIAGNOSIS — I35.0 SEVERE AORTIC STENOSIS: ICD-10-CM

## 2024-03-08 DIAGNOSIS — Z95.2 S/P TAVR (TRANSCATHETER AORTIC VALVE REPLACEMENT): ICD-10-CM

## 2024-03-08 DIAGNOSIS — G47.33 OSA (OBSTRUCTIVE SLEEP APNEA): ICD-10-CM

## 2024-03-08 DIAGNOSIS — Z79.01 ANTICOAGULANT LONG-TERM USE: ICD-10-CM

## 2024-03-08 DIAGNOSIS — R00.1 BRADYCARDIA: ICD-10-CM

## 2024-03-08 DIAGNOSIS — E78.2 MIXED HYPERLIPIDEMIA: ICD-10-CM

## 2024-03-08 DIAGNOSIS — I34.2 NON-RHEUMATIC MITRAL VALVE STENOSIS: ICD-10-CM

## 2024-03-08 DIAGNOSIS — I27.20 PULMONARY HYPERTENSION (HCC): ICD-10-CM

## 2024-03-08 DIAGNOSIS — I48.0 PAROXYSMAL ATRIAL FIBRILLATION (HCC): ICD-10-CM

## 2024-03-08 DIAGNOSIS — I50.32 CHRONIC DIASTOLIC (CONGESTIVE) HEART FAILURE (HCC): ICD-10-CM

## 2024-03-08 DIAGNOSIS — I10 ESSENTIAL HYPERTENSION: ICD-10-CM

## 2024-03-08 LAB — INR PPP: 4.1 (ref 0.84–1.19)

## 2024-03-08 PROCEDURE — 99214 OFFICE O/P EST MOD 30 MIN: CPT | Performed by: INTERNAL MEDICINE

## 2024-03-08 RX ORDER — WARFARIN SODIUM 5 MG/1
TABLET ORAL
Qty: 180 TABLET | Refills: 3 | Status: SHIPPED | OUTPATIENT
Start: 2024-03-08 | End: 2024-03-08

## 2024-03-08 RX ORDER — WARFARIN SODIUM 5 MG/1
TABLET ORAL
Qty: 180 TABLET | Refills: 3 | Status: SHIPPED | OUTPATIENT
Start: 2024-03-08
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
## 2024-03-08 NOTE — TELEPHONE ENCOUNTER
S/w pt. She was questioning dose for couamdin , as per darin Advised to hold tonight then go to 2.5mg daily except Sun take 5mg and retest in 1 week

## 2024-03-08 NOTE — TELEPHONE ENCOUNTER
Name of Caller:  Patient      Call back Number:  [phone]    Medication(s):  warfarin (COUMADIN) 5 mg tablet    Are we prescribing provider?:    30 or 90 day supply: 90 day     Pharmacy name/number:  [facility]/pharmacy #[id number] - [place] - [address]     Last or Next appt?:

## 2024-03-08 NOTE — TELEPHONE ENCOUNTER
Patient was seen today and has questions regarding her medications and how to take them.    [phone]

## 2024-03-11 ENCOUNTER — TELEPHONE (OUTPATIENT)
Dept: CARDIOLOGY CLINIC | Facility: CLINIC | Age: 84
End: 2024-03-11

## 2024-03-11 ENCOUNTER — TELEPHONE (OUTPATIENT)
Dept: FAMILY MEDICINE CLINIC | Facility: CLINIC | Age: 84
End: 2024-03-11

## 2024-03-11 NOTE — TELEPHONE ENCOUNTER
Call transferred.    Pt stated she noticed black stools on Saturday and Sunday.   Pt has not used restroom today.    Pt denies pain.  Pt denied blood in urine.    I advised pt to proceed to ED to further access.    Pt would like Dr. Esteves's recommendation.

## 2024-03-11 NOTE — TELEPHONE ENCOUNTER
[facility] called to notify you that she had 3 bowel movements of black stool this weekend. She already notified Dr. Esteves's office but has not gotten a response yet. They would also like your approval to recertify physical therapy for 3/25. Please advise.

## 2024-03-11 NOTE — TELEPHONE ENCOUNTER
Esteban on Ludmila's vm with dosing instructions. Current dose is 2.5mg daily except Sun take 5mg and retest on 3/14

## 2024-03-11 NOTE — TELEPHONE ENCOUNTER
Brianda, from [facility] called and is requesting a call back with pt's pt inr instructions from last Thursday.     Brianda can be reached at [phone]

## 2024-03-11 NOTE — TELEPHONE ENCOUNTER
Adrienne from [facility] called. She would like to know is there anything that Marisol would need to do. Her stool is no longer dark.    Adrienne  - [phone]

## 2024-03-11 NOTE — TELEPHONE ENCOUNTER
S/w Adrienne from [facility]. Advised pt has nothing further that she needs to do at this time. Adrienne verbalized understanding

## 2024-03-13 ENCOUNTER — TELEPHONE (OUTPATIENT)
Dept: CARDIOLOGY CLINIC | Facility: CLINIC | Age: 84
End: 2024-03-13

## 2024-03-13 ENCOUNTER — TELEPHONE (OUTPATIENT)
Dept: FAMILY MEDICINE CLINIC | Facility: CLINIC | Age: 84
End: 2024-03-13

## 2024-03-13 ENCOUNTER — ANTICOAG VISIT (OUTPATIENT)
Dept: CARDIOLOGY CLINIC | Facility: CLINIC | Age: 84
End: 2024-03-13

## 2024-03-13 LAB — INR PPP: 3.1 (ref 0.84–1.19)

## 2024-03-13 NOTE — TELEPHONE ENCOUNTER
Brianda from [facility] called to report pt's PT INR for today reported at a 3.1.       Brianda can be reached at [phone]

## 2024-03-13 NOTE — TELEPHONE ENCOUNTER
Patient Marisol [phone] contacting office to advise [facility] on [address] will not fill the coumadin due to no dosage instructions provided at time of refill.

## 2024-03-13 NOTE — TELEPHONE ENCOUNTER
Attempted to call Ludmila. No answer and vm is full so unable to lv a message. S/w pt. Advised sd and retest in 1 week.

## 2024-03-13 NOTE — TELEPHONE ENCOUNTER
Pt had labs drawn less than 2 months ago. Please find out what labs Ludmila is looking to have ordered. Diya is out of the office today. Did she notify Dr. Esteves's office in regard to the nosebleed since Dr. Esteves is managing Marisol's coumadin?

## 2024-03-13 NOTE — TELEPHONE ENCOUNTER
Called Ludmila back but no answer and wasn't able to leave a voicemail.  Will try again before the end of the day. Thanks

## 2024-03-13 NOTE — TELEPHONE ENCOUNTER
Ludmila from [facility] call in and stated that the patient was newly place on Coumadin 5mg by Dr. Esteves and she was to the ER with a nose bleed last week. Ludmila ask for a callback from PCP to set up labs as she know patient has amenia. Please review and advise. Thanks

## 2024-03-14 NOTE — TELEPHONE ENCOUNTER
Called Ludmila back from [facility] and she stated that she did check with Dr. Esteves's office. She wanted to know if Diya could order CBC and any other labs she wants patient to have done completed. Please advise.  Thanks

## 2024-03-15 DIAGNOSIS — I50.32 CHRONIC DIASTOLIC (CONGESTIVE) HEART FAILURE (HCC): Primary | ICD-10-CM

## 2024-03-15 DIAGNOSIS — D50.8 IRON DEFICIENCY ANEMIA SECONDARY TO INADEQUATE DIETARY IRON INTAKE: ICD-10-CM

## 2024-03-15 NOTE — H&P
History of Present Illness: The patient is a 79 y o  female who presents with complaints of left leg pain    Past Medical History:   Diagnosis Date   • Anemia 08/22/2018   • Anxiety    • Arthritis    • AVB (atrioventricular block)     first degree   • Cataract    • CHF (congestive heart failure) (HCC)    • COPD, mild (HCC)    • Coronary artery disease    • Dislocation of right shoulder joint    • Frequent UTI    • GERD (gastroesophageal reflux disease)    • H/O: pneumonia    • Heme positive stool    • Hyperlipidemia    • Hypertension    • Hypothyroidism    • Morbid obesity with BMI of 50 0-59 9, adult ([facility] )    • Obesity, morbid ([facility] ) 08/22/2018   • JANICE on CPAP    • Pulmonary hypertension ([place] [address] ) 08/22/2018   • Severe aortic stenosis    • Simple goiter    • Skin cyst     within the armpits, right   • Wears glasses        Past Surgical History:   Procedure Laterality Date   • BREAST BIOPSY     • CARDIAC CATHETERIZATION     • CARPAL TUNNEL RELEASE Bilateral    • CHOLECYSTECTOMY     • DILATION AND CURETTAGE OF UTERUS     • HYSTEROSCOPY     • MASTOID SURGERY     • NH COLONOSCOPY FLX DX W/COLLJ SPEC WHEN PFRMD N/A 9/6/2018    Procedure: COLONOSCOPY;  Surgeon: Velia Perry MD;  Location: [facility]; Service: Gastroenterology   • NH ECHO TRANSESOPHAG R-T 2D W/PRB IMG ACQUISJ I&R N/A 10/9/2018    Procedure: INTRA-OP TRANSESOPHAGEAL ECHOCARDIOGRAM (GARRISON); Surgeon: Radha Lacy DO;  [facility]: [facility];  Service: Cardiac Surgery   • NH ESOPHAGOGASTRODUODENOSCOPY TRANSORAL DIAGNOSTIC N/A 8/31/2018    Procedure: ESOPHAGOGASTRODUODENOSCOPY (EGD); Surgeon: Velia Perry MD;  [facility]: [facility]; Service: Gastroenterology   • NH REPLACE AORTIC VALVE OPENFEMORAL ARTERY APPROACH N/A 10/9/2018    Procedure: REPLACEMENT AORTIC VALVE TRANSCATHETER (TAVR) TRANSFEMORAL W/ 23 MM MENDOZA NOE S3 VALVE (ACCESS OF LEFT);   Surgeon: Radha Lacy DO;  [facility];  Service: Cardiac Surgery   • TOTAL HIP The ECG revealed sinus bradycardia. The ECG rate was 54 bpm. ARTHROPLASTY Left 2007   • TOTAL KNEE ARTHROPLASTY Bilateral          Current Outpatient Medications:   •  acetaminophen (TYLENOL) 500 mg tablet, Take 500 mg by mouth every 6 (six) hours as needed, Disp: , Rfl:   •  albuterol (2 5 mg/3 mL) 0 083 % nebulizer solution, Take 3 mL (2 5 mg total) by nebulization every 6 (six) hours as needed for wheezing or shortness of breath (Patient taking differently: Take 2 5 mg by nebulization every 6 (six) hours as needed for wheezing or shortness of breath PRN), Disp: 360 mL, Rfl: 5  •  albuterol (PROVENTIL HFA,VENTOLIN HFA) 90 mcg/act inhaler, Inhale 2 puffs every 6 (six) hours as needed for wheezing, Disp: 1 Inhaler, Rfl: 3  •  Ascorbic Acid, Vitamin C, (VITAMIN C) 100 MG tablet, Take 100 mg by mouth daily, Disp: , Rfl:   •  aspirin (ECOTRIN LOW STRENGTH) 81 mg EC tablet, Take 1 tablet (81 mg total) by mouth daily, Disp: 100 tablet, Rfl: 0  •  b complex vitamins capsule, Take 1 capsule by mouth 2 (two) times a day  , Disp: , Rfl:   •  budesonide-formoterol (Symbicort) 160-4 5 mcg/act inhaler, Inhale 2 puffs 2 (two) times a day, Disp: 10 2 g, Rfl: 3  •  Calcium Carb-Cholecalciferol (CALCIUM 600 + D PO), Take 1 tablet by mouth 2 (two) times a day, Disp: , Rfl:   •  Calcium Carb-Cholecalciferol 600-10 MG-MCG TABS, Take 1 tablet by mouth, Disp: , Rfl:   •  Cranberry 1000 MG CAPS, Take by mouth, Disp: , Rfl:   •  Cranberry 250 MG TABS, Take by mouth, Disp: , Rfl:   •  Diclofenac Sodium (VOLTAREN) 1 %, Apply 2 g topically 4 (four) times a day, Disp: 50 g, Rfl: 0  •  Fesoterodine Fumarate ER (Toviaz) 8 MG TB24, Take 8 mg by mouth daily , Disp: , Rfl:   •  furosemide (LASIX) 20 mg tablet, Take 1 tablet (20 mg total) by mouth daily as needed (wt gain) In the morning, Disp: 90 tablet, Rfl: 3  •  ibuprofen (MOTRIN) 200 mg tablet, Take 200 mg by mouth every 6 (six) hours as needed, Disp: , Rfl:   •  levothyroxine 50 mcg tablet, TAKE 1 TABLET BY MOUTH EVERY DAY, Disp: 90 tablet, Rfl: 1  • olmesartan (BENICAR) 5 mg tablet, TAKE 2 TABLETS BY MOUTH EVERY DAY, Disp: 180 tablet, Rfl: 3  •  omeprazole (PriLOSEC) 40 MG capsule, TAKE 1 CAPSULE BY MOUTH TWICE A DAY, Disp: 180 capsule, Rfl: 1  •  sertraline (ZOLOFT) 100 mg tablet, TAKE 1 TABLET BY MOUTH EVERY DAY, Disp: 90 tablet, Rfl: 0  •  simvastatin (ZOCOR) 40 mg tablet, TAKE 1 TABLET BY MOUTH DAILY AT BEDTIME, Disp: 90 tablet, Rfl: 1    Allergies   Allergen Reactions   • Latex Rash   • Neosporin [Neomycin-Bacitracin Zn-Polymyx] Rash and Other (See Comments)     hives per [facility] order       Physical Exam:   Vitals:    05/09/23 0929   BP: 104/63   Pulse: 58   Resp: 20   Temp: (!) 97 1 °F (36 2 °C)   SpO2: 95%     General: Awake, Alert, Oriented x 3, Mood and affect appropriate  Respiratory: Respirations even and unlabored  Cardiovascular: Peripheral pulses intact; no edema  Musculoskeletal Exam: back non erythematous no lesions    ASA Score: 3    Patient/Chart Verification  Patient ID Verified: Verbal  ID Band Applied: No  Consents Confirmed: To be obtained in the Pre-Procedure area  H&P( within 30 days) Verified: To be obtained in the Pre-Procedure area  Interval H&P(within 24 hr) Complete (required for Outpatients and Surgery Admit only): To be obtained in the Pre-Procedure area  Allergies Reviewed: Yes  Anticoag/NSAID held?: NA  Currently on antibiotics?: No  Pregnancy denied?: NA    Assessment:   1   Spinal stenosis of lumbar region with neurogenic claudication        Plan: L5-S1 LESI

## 2024-03-18 ENCOUNTER — TELEPHONE (OUTPATIENT)
Dept: FAMILY MEDICINE CLINIC | Facility: CLINIC | Age: 84
End: 2024-03-18

## 2024-03-18 ENCOUNTER — TELEPHONE (OUTPATIENT)
Dept: CARDIOLOGY CLINIC | Facility: CLINIC | Age: 84
End: 2024-03-18

## 2024-03-18 NOTE — TELEPHONE ENCOUNTER
"Informed Marisol to check with cardiology.  She said \"so I can never drive?\"  I told her to check with the cardiologist.  She sounded very sad.  "

## 2024-03-18 NOTE — TELEPHONE ENCOUNTER
She wants to know if she can drive.  She said she saw the cardiologist and she doesn't need a pacemaker .

## 2024-03-18 NOTE — TELEPHONE ENCOUNTER
Spoke with pt and Dr. Esteves's message was relayed. Pt stated that she did not pass out at any time. Pt would like to have a letter stating that she can drive.    Please advise.     Thanks!

## 2024-03-18 NOTE — TELEPHONE ENCOUNTER
Patient called & would like to know if Dr. Esteves would write her a  letter stating that she is ok to drive?      Pt can be reached [phone]

## 2024-03-19 ENCOUNTER — TELEPHONE (OUTPATIENT)
Dept: FAMILY MEDICINE CLINIC | Facility: CLINIC | Age: 84
End: 2024-03-19

## 2024-03-19 NOTE — TELEPHONE ENCOUNTER
Pt was discussed with Dr. Esteves and was advised for pt to contact her PCP in regards to letter.     When speaking with pt, pt stated PCP was the one that advised her not to drive.   PCP advised pt if Dr. Esteves is okay with her driving then she is okay with it.    I advised pt to reach out to PCP as driving restriction originated from PCP.  Pt verbalized understanding.

## 2024-03-19 NOTE — TELEPHONE ENCOUNTER
Patient called back again today- she wants a definitive answer on whether or not she can drive again. There is an encounter thread between cardiology and again our office. Are we able to review Dr. Esteves's encounter from yesterday and have Diya's definitive input? Patient would appreciate a direct yes or no.    Please advise, thank you!

## 2024-03-20 ENCOUNTER — TELEPHONE (OUTPATIENT)
Dept: FAMILY MEDICINE CLINIC | Facility: CLINIC | Age: 84
End: 2024-03-20

## 2024-03-20 ENCOUNTER — TELEPHONE (OUTPATIENT)
Dept: LAB | Facility: HOSPITAL | Age: 84
End: 2024-03-20

## 2024-03-20 ENCOUNTER — TELEPHONE (OUTPATIENT)
Dept: CARDIOLOGY CLINIC | Facility: CLINIC | Age: 84
End: 2024-03-20

## 2024-03-20 ENCOUNTER — ANTICOAG VISIT (OUTPATIENT)
Dept: CARDIOLOGY CLINIC | Facility: CLINIC | Age: 84
End: 2024-03-20

## 2024-03-20 DIAGNOSIS — Z79.01 LONG TERM (CURRENT) USE OF ANTICOAGULANTS: Primary | ICD-10-CM

## 2024-03-20 LAB — INR PPP: 2.8 (ref 0.84–1.19)

## 2024-03-20 NOTE — TELEPHONE ENCOUNTER
[facility]udmila from [facility] left voicemail reporting [place] INR is 2.8 Pt is 33.8. She is being discharged and will need mobile Lab. Please call Ludmila back at [phone] to discuss discharge.

## 2024-03-20 NOTE — TELEPHONE ENCOUNTER
Spoke with Ludmila at [facility]. She stated that patient was unable to have her labs drawn so she asked if we could please set her up for Mobile Lab. I called to schedule patient and was told that they would call her to schedule. I informed Ludmila that they are going to call her and she stated fine, she would ask patient to call her once she schedules.    Upon speaking with her, she stated that patient has shortness of breath on exertion. She also stated that she is on Benicar and wanted us to know her heart rate was 56.    Thank you!

## 2024-03-20 NOTE — PROGRESS NOTES
S/w pt and Ludmila. Advised sd and retest in 1 week. Pt is dc'd from [facility] so I sent a message to the mobile lab for an INR in 1 week.

## 2024-03-20 NOTE — TELEPHONE ENCOUNTER
S/w pt and Ludmila. Advised sd and retest in 1 week. Pt is dc'd from  so I sent a message to the mobile lab for an INR in 1 week.

## 2024-03-21 ENCOUNTER — LAB (OUTPATIENT)
Dept: LAB | Facility: HOSPITAL | Age: 84
End: 2024-03-21
Payer: MEDICARE

## 2024-03-21 ENCOUNTER — TELEPHONE (OUTPATIENT)
Dept: CARDIOLOGY CLINIC | Facility: CLINIC | Age: 84
End: 2024-03-21

## 2024-03-21 DIAGNOSIS — D50.8 IRON DEFICIENCY ANEMIA SECONDARY TO INADEQUATE DIETARY IRON INTAKE: ICD-10-CM

## 2024-03-21 DIAGNOSIS — I50.32 CHRONIC DIASTOLIC (CONGESTIVE) HEART FAILURE (HCC): ICD-10-CM

## 2024-03-21 DIAGNOSIS — Z79.01 LONG TERM (CURRENT) USE OF ANTICOAGULANTS: ICD-10-CM

## 2024-03-21 LAB
ALBUMIN SERPL BCP-MCNC: 4.2 G/DL (ref 3.5–5)
ALP SERPL-CCNC: 66 U/L (ref 34–104)
ALT SERPL W P-5'-P-CCNC: 21 U/L (ref 7–52)
ANION GAP SERPL CALCULATED.3IONS-SCNC: 8 MMOL/L (ref 4–13)
AST SERPL W P-5'-P-CCNC: 28 U/L (ref 13–39)
BASOPHILS # BLD AUTO: 0.06 THOUSANDS/ÂΜL (ref 0–0.1)
BASOPHILS NFR BLD AUTO: 1 % (ref 0–1)
BILIRUB SERPL-MCNC: 0.45 MG/DL (ref 0.2–1)
BUN SERPL-MCNC: 24 MG/DL (ref 5–25)
CALCIUM SERPL-MCNC: 10.2 MG/DL (ref 8.4–10.2)
CHLORIDE SERPL-SCNC: 100 MMOL/L (ref 96–108)
CO2 SERPL-SCNC: 30 MMOL/L (ref 21–32)
CREAT SERPL-MCNC: 0.82 MG/DL (ref 0.6–1.3)
EOSINOPHIL # BLD AUTO: 0.48 THOUSAND/ÂΜL (ref 0–0.61)
EOSINOPHIL NFR BLD AUTO: 6 % (ref 0–6)
ERYTHROCYTE [DISTWIDTH] IN BLOOD BY AUTOMATED COUNT: 17.4 % (ref 11.6–15.1)
FERRITIN SERPL-MCNC: 54 NG/ML (ref 11–307)
GFR SERPL CREATININE-BSD FRML MDRD: 65 ML/MIN/1.73SQ M
GLUCOSE P FAST SERPL-MCNC: 75 MG/DL (ref 65–99)
HCT VFR BLD AUTO: 40.4 % (ref 34.8–46.1)
HGB BLD-MCNC: 12.3 G/DL (ref 11.5–15.4)
IMM GRANULOCYTES # BLD AUTO: 0.04 THOUSAND/UL (ref 0–0.2)
IMM GRANULOCYTES NFR BLD AUTO: 1 % (ref 0–2)
INR PPP: 2.22 (ref 0.84–1.19)
IRON SATN MFR SERPL: 14 % (ref 15–50)
IRON SERPL-MCNC: 49 UG/DL (ref 50–212)
LYMPHOCYTES # BLD AUTO: 2.01 THOUSANDS/ÂΜL (ref 0.6–4.47)
LYMPHOCYTES NFR BLD AUTO: 26 % (ref 14–44)
MCH RBC QN AUTO: 27.2 PG (ref 26.8–34.3)
MCHC RBC AUTO-ENTMCNC: 30.4 G/DL (ref 31.4–37.4)
MCV RBC AUTO: 89 FL (ref 82–98)
MONOCYTES # BLD AUTO: 0.71 THOUSAND/ÂΜL (ref 0.17–1.22)
MONOCYTES NFR BLD AUTO: 9 % (ref 4–12)
NEUTROPHILS # BLD AUTO: 4.53 THOUSANDS/ÂΜL (ref 1.85–7.62)
NEUTS SEG NFR BLD AUTO: 57 % (ref 43–75)
NRBC BLD AUTO-RTO: 0 /100 WBCS
PLATELET # BLD AUTO: 367 THOUSANDS/UL (ref 149–390)
PMV BLD AUTO: 10.3 FL (ref 8.9–12.7)
POTASSIUM SERPL-SCNC: 4.6 MMOL/L (ref 3.5–5.3)
PROT SERPL-MCNC: 7 G/DL (ref 6.4–8.4)
PROTHROMBIN TIME: 24.2 SECONDS (ref 11.6–14.5)
RBC # BLD AUTO: 4.52 MILLION/UL (ref 3.81–5.12)
SODIUM SERPL-SCNC: 138 MMOL/L (ref 135–147)
TIBC SERPL-MCNC: 350 UG/DL (ref 250–450)
UIBC SERPL-MCNC: 301 UG/DL (ref 155–355)
WBC # BLD AUTO: 7.83 THOUSAND/UL (ref 4.31–10.16)

## 2024-03-21 PROCEDURE — 85610 PROTHROMBIN TIME: CPT

## 2024-03-21 PROCEDURE — 83550 IRON BINDING TEST: CPT

## 2024-03-21 PROCEDURE — 82728 ASSAY OF FERRITIN: CPT

## 2024-03-21 PROCEDURE — 85025 COMPLETE CBC W/AUTO DIFF WBC: CPT

## 2024-03-21 PROCEDURE — 83540 ASSAY OF IRON: CPT

## 2024-03-21 PROCEDURE — 36415 COLL VENOUS BLD VENIPUNCTURE: CPT

## 2024-03-21 PROCEDURE — 80053 COMPREHEN METABOLIC PANEL: CPT

## 2024-03-21 NOTE — TELEPHONE ENCOUNTER
Faxed office note to number provided.  
Liza from [facility] calling in to request medical office notes for patient. They need to establish cardiology diagnosis. Please fax to [phone]  Liza can be reached at [phone] if you have any questions.  
yes

## 2024-03-22 ENCOUNTER — ANTICOAG VISIT (OUTPATIENT)
Dept: CARDIOLOGY CLINIC | Facility: CLINIC | Age: 84
End: 2024-03-22

## 2024-03-22 DIAGNOSIS — D50.8 IRON DEFICIENCY ANEMIA SECONDARY TO INADEQUATE DIETARY IRON INTAKE: Primary | ICD-10-CM

## 2024-03-22 RX ORDER — BACILLUS COAGULANS 1B CELL
1 CAPSULE ORAL DAILY
Qty: 90 TABLET | Refills: 1 | Status: SHIPPED | OUTPATIENT
Start: 2024-03-22

## 2024-03-26 DIAGNOSIS — F41.8 DEPRESSION WITH ANXIETY: ICD-10-CM

## 2024-03-26 RX ORDER — SERTRALINE HYDROCHLORIDE 100 MG/1
TABLET, FILM COATED ORAL
Qty: 90 TABLET | Refills: 1 | Status: SHIPPED | OUTPATIENT
Start: 2024-03-26

## 2024-03-28 ENCOUNTER — TELEPHONE (OUTPATIENT)
Dept: CARDIOLOGY CLINIC | Facility: CLINIC | Age: 84
End: 2024-03-28

## 2024-03-28 ENCOUNTER — LAB (OUTPATIENT)
Dept: LAB | Facility: HOSPITAL | Age: 84
End: 2024-03-28
Attending: INTERNAL MEDICINE
Payer: MEDICARE

## 2024-03-28 DIAGNOSIS — Z79.01 LONG TERM (CURRENT) USE OF ANTICOAGULANTS: ICD-10-CM

## 2024-03-28 LAB
INR PPP: 2.27 (ref 0.84–1.19)
PROTHROMBIN TIME: 24.6 SECONDS (ref 11.6–14.5)

## 2024-03-28 PROCEDURE — 36415 COLL VENOUS BLD VENIPUNCTURE: CPT

## 2024-03-28 PROCEDURE — 85610 PROTHROMBIN TIME: CPT

## 2024-03-28 NOTE — TELEPHONE ENCOUNTER
S/w pt. Advised sd and retest in 1 week. Explained that the black stools may be due to the iron supplement. S/w Ludmila. She is going out every other week. Sent request to mobile lab to draw next week and Ludmila will draw in 2 weeks

## 2024-03-28 NOTE — TELEPHONE ENCOUNTER
[address]  [facility] EMERGENCY DEPT  [address] [id number] [phone]    Work/School Note    Date: 11/7/2022    To Whom It May concern:    Verna Pedraza was seen and treated today in the emergency room by the following provider(s):  Attending Provider: Sophy Langley MD  Physician Assistant: WAGNER Patel. Verna Pedraza is excused from work/school on 11/7/2022 through 11/9/2022. He is medically clear to return to work/school on 11/10/2022.          Sincerely,          WAGNER Morrow Hello,   Requesting a pt/inr home draw to be done around 4/4. The order is in the chart. Please call the pt to set up.     Thank you,  Ani

## 2024-03-28 NOTE — TELEPHONE ENCOUNTER
Ludmila from home health calling in pt inr 3.0.    Mobile lab did not show up yesterday for pt.     Pt recently started vit C and Iron supplement, pt is now experiencing black stool.     Please advise.

## 2024-03-29 NOTE — TELEPHONE ENCOUNTER
Good Morning.  Marisol is scheduled for 4/4 between 10 and 12 - I tried reaching the patient - she does not answer and her VM has not yet been set up - I will try her again later

## 2024-04-04 ENCOUNTER — LAB (OUTPATIENT)
Dept: LAB | Facility: HOSPITAL | Age: 84
End: 2024-04-04
Attending: INTERNAL MEDICINE
Payer: MEDICARE

## 2024-04-04 DIAGNOSIS — Z79.01 LONG TERM (CURRENT) USE OF ANTICOAGULANTS: ICD-10-CM

## 2024-04-04 LAB
INR PPP: 1.94 (ref 0.84–1.19)
PROTHROMBIN TIME: 21.8 SECONDS (ref 11.6–14.5)

## 2024-04-04 PROCEDURE — 36415 COLL VENOUS BLD VENIPUNCTURE: CPT

## 2024-04-04 PROCEDURE — 85610 PROTHROMBIN TIME: CPT

## 2024-04-05 ENCOUNTER — ANTICOAG VISIT (OUTPATIENT)
Dept: CARDIOLOGY CLINIC | Facility: CLINIC | Age: 84
End: 2024-04-05

## 2024-04-05 ENCOUNTER — TELEPHONE (OUTPATIENT)
Dept: CARDIOLOGY CLINIC | Facility: CLINIC | Age: 84
End: 2024-04-05

## 2024-04-05 NOTE — TELEPHONE ENCOUNTER
Hi,  Requesting a pt/inr home draw to be done around 4/11. Please call the pt to set up. Script is in the chart.     Thank you.

## 2024-04-05 NOTE — RESULT ENCOUNTER NOTE
S/w pt. Advised to stay on the same dose and retest in one week. Sent request to mobile lab for a home draw.

## 2024-04-06 ENCOUNTER — APPOINTMENT (EMERGENCY)
Dept: CT IMAGING | Facility: HOSPITAL | Age: 84
End: 2024-04-06
Payer: MEDICARE

## 2024-04-06 ENCOUNTER — HOSPITAL ENCOUNTER (EMERGENCY)
Facility: HOSPITAL | Age: 84
Discharge: HOME | End: 2024-04-06
Attending: EMERGENCY MEDICINE
Payer: MEDICARE

## 2024-04-06 ENCOUNTER — APPOINTMENT (EMERGENCY)
Dept: RADIOLOGY | Facility: HOSPITAL | Age: 84
End: 2024-04-06
Payer: MEDICARE

## 2024-04-06 VITALS
DIASTOLIC BLOOD PRESSURE: 72 MMHG | HEART RATE: 74 BPM | RESPIRATION RATE: 20 BRPM | SYSTOLIC BLOOD PRESSURE: 148 MMHG | OXYGEN SATURATION: 92 %

## 2024-04-06 DIAGNOSIS — S30.1XXA CONTUSION OF FLANK, INITIAL ENCOUNTER: ICD-10-CM

## 2024-04-06 DIAGNOSIS — S09.90XA INJURY OF HEAD, INITIAL ENCOUNTER: Primary | ICD-10-CM

## 2024-04-06 LAB
ALBUMIN SERPL BCP-MCNC: 4.3 G/DL (ref 3.5–5)
ALP SERPL-CCNC: 67 U/L (ref 34–104)
ALT SERPL W P-5'-P-CCNC: 18 U/L (ref 7–52)
ANION GAP SERPL CALCULATED.3IONS-SCNC: 5 MMOL/L (ref 4–13)
AST SERPL W P-5'-P-CCNC: 27 U/L (ref 13–39)
BASOPHILS # BLD AUTO: 0.04 THOUSANDS/ÂΜL (ref 0–0.1)
BASOPHILS NFR BLD AUTO: 1 % (ref 0–1)
BILIRUB DIRECT SERPL-MCNC: 0.15 MG/DL (ref 0–0.2)
BILIRUB SERPL-MCNC: 0.62 MG/DL (ref 0.2–1)
BUN SERPL-MCNC: 27 MG/DL (ref 5–25)
CALCIUM SERPL-MCNC: 10.5 MG/DL (ref 8.4–10.2)
CHLORIDE SERPL-SCNC: 103 MMOL/L (ref 96–108)
CO2 SERPL-SCNC: 29 MMOL/L (ref 21–32)
CREAT SERPL-MCNC: 0.85 MG/DL (ref 0.6–1.3)
EOSINOPHIL # BLD AUTO: 0.44 THOUSAND/ÂΜL (ref 0–0.61)
EOSINOPHIL NFR BLD AUTO: 6 % (ref 0–6)
ERYTHROCYTE [DISTWIDTH] IN BLOOD BY AUTOMATED COUNT: 17.2 % (ref 11.6–15.1)
GFR SERPL CREATININE-BSD FRML MDRD: 63 ML/MIN/1.73SQ M
GLUCOSE SERPL-MCNC: 135 MG/DL (ref 65–140)
HCT VFR BLD AUTO: 42.1 % (ref 34.8–46.1)
HGB BLD-MCNC: 13 G/DL (ref 11.5–15.4)
IMM GRANULOCYTES # BLD AUTO: 0.04 THOUSAND/UL (ref 0–0.2)
IMM GRANULOCYTES NFR BLD AUTO: 1 % (ref 0–2)
INR PPP: 1.56 (ref 0.84–1.19)
LYMPHOCYTES # BLD AUTO: 1.68 THOUSANDS/ÂΜL (ref 0.6–4.47)
LYMPHOCYTES NFR BLD AUTO: 21 % (ref 14–44)
MCH RBC QN AUTO: 27.1 PG (ref 26.8–34.3)
MCHC RBC AUTO-ENTMCNC: 30.9 G/DL (ref 31.4–37.4)
MCV RBC AUTO: 88 FL (ref 82–98)
MONOCYTES # BLD AUTO: 0.62 THOUSAND/ÂΜL (ref 0.17–1.22)
MONOCYTES NFR BLD AUTO: 8 % (ref 4–12)
NEUTROPHILS # BLD AUTO: 5.08 THOUSANDS/ÂΜL (ref 1.85–7.62)
NEUTS SEG NFR BLD AUTO: 63 % (ref 43–75)
NRBC BLD AUTO-RTO: 0 /100 WBCS
PLATELET # BLD AUTO: 317 THOUSANDS/UL (ref 149–390)
PMV BLD AUTO: 9.8 FL (ref 8.9–12.7)
POTASSIUM SERPL-SCNC: 4.6 MMOL/L (ref 3.5–5.3)
PROT SERPL-MCNC: 7.5 G/DL (ref 6.4–8.4)
PROTHROMBIN TIME: 19.4 SECONDS (ref 11.6–14.5)
RBC # BLD AUTO: 4.79 MILLION/UL (ref 3.81–5.12)
SODIUM SERPL-SCNC: 137 MMOL/L (ref 135–147)
WBC # BLD AUTO: 7.9 THOUSAND/UL (ref 4.31–10.16)

## 2024-04-06 PROCEDURE — 85025 COMPLETE CBC W/AUTO DIFF WBC: CPT | Performed by: EMERGENCY MEDICINE

## 2024-04-06 PROCEDURE — 85610 PROTHROMBIN TIME: CPT | Performed by: EMERGENCY MEDICINE

## 2024-04-06 PROCEDURE — 80048 BASIC METABOLIC PNL TOTAL CA: CPT | Performed by: EMERGENCY MEDICINE

## 2024-04-06 PROCEDURE — 74177 CT ABD & PELVIS W/CONTRAST: CPT

## 2024-04-06 PROCEDURE — 71260 CT THORAX DX C+: CPT

## 2024-04-06 PROCEDURE — 36415 COLL VENOUS BLD VENIPUNCTURE: CPT | Performed by: EMERGENCY MEDICINE

## 2024-04-06 PROCEDURE — 93005 ELECTROCARDIOGRAM TRACING: CPT

## 2024-04-06 PROCEDURE — 99285 EMERGENCY DEPT VISIT HI MDM: CPT | Performed by: EMERGENCY MEDICINE

## 2024-04-06 PROCEDURE — 72125 CT NECK SPINE W/O DYE: CPT

## 2024-04-06 PROCEDURE — 70450 CT HEAD/BRAIN W/O DYE: CPT

## 2024-04-06 PROCEDURE — 80076 HEPATIC FUNCTION PANEL: CPT | Performed by: EMERGENCY MEDICINE

## 2024-04-06 PROCEDURE — 71045 X-RAY EXAM CHEST 1 VIEW: CPT

## 2024-04-06 RX ADMIN — IOHEXOL 100 ML: 350 INJECTION, SOLUTION INTRAVENOUS at 13:30

## 2024-04-06 NOTE — ED PROVIDER NOTES
History  Chief Complaint   Patient presents with    Fall     Patient fell reaching for bible pt reported no LOC fell on right side and hit back of her head and side, but is complaining of left sided hip pain.     HPI patient is a 83-year-old female reports that she was reaching for her Bible while using her walker and fell landing on reports her right side but now complains of some left-sided low back pain,  Left-sided flank pain.  Patient reports she did hit her head.  She reports hitting the back of her head.  Denies any loss of consciousness.  She denies any extremity injury.  She reports she has some chronic left hip pain status post hip replacement and was wondering about evaluating that.  Denies any difficulty breathing.  Denies any shortness of breath.  Denies any leading.  Past medical history of on Coumadin, congestive heart failure, COPD,  Family history social history, non-smoker, no history of drug abuse    Prior to Admission Medications   Prescriptions Last Dose Informant Patient Reported? Taking?   Calcium Carb-Cholecalciferol (CALCIUM 600 + D PO)  Self Yes No   Sig: Take 1 tablet by mouth 2 (two) times a day   Cranberry 250 MG TABS  Self Yes No   Sig: Take by mouth   Fesoterodine Fumarate ER (Toviaz) 8 MG TB24  Self Yes No   Sig: Take 8 mg by mouth daily. Not taking  Indications: Urinary Incontinence   Iron-Vitamin C (Vitron-C)  MG TABS   No No   Sig: Take 1 tablet by mouth in the morning   Myrbetriq 50 MG TB24  Self Yes No   Sig: Take 1 tablet by mouth daily   Omega-3 Fatty Acids (Fish Oil) 300 MG CAPS  Self Yes No   Sig: Take by mouth   Turmeric (QC Tumeric Complex) 500 MG CAPS  Self Yes No   Sig: Take by mouth   acetaminophen (TYLENOL) 500 mg tablet  Self Yes No   Sig: Take 500 mg by mouth every 6 (six) hours as needed   aspirin (ECOTRIN LOW STRENGTH) 81 mg EC tablet  Self No No   Sig: Take 1 tablet (81 mg total) by mouth daily   b complex vitamins capsule  Self Yes No   Sig: Take 1 capsule  by mouth 2 (two) times a day     furosemide (LASIX) 20 mg tablet  Self No No   Sig: Take 1 tablet (20 mg total) by mouth daily as needed (wt gain)   hydrocortisone 2.5 % cream  Self No No   Sig: Apply topically 2 (two) times a day   levothyroxine 50 mcg tablet  Self No No   Sig: Take 1 tablet (50 mcg total) by mouth daily   methocarbamol (ROBAXIN) 500 mg tablet  Self No No   Sig: Take 1 tablet (500 mg total) by mouth 3 (three) times a day   ofloxacin (OCUFLOX) 0.3 % ophthalmic solution  Self No No   Si drops twice daily to left ear x 10 days.   olmesartan (BENICAR) 5 mg tablet  Self No No   Sig: TAKE 2 TABLETS BY MOUTH EVERY DAY   omeprazole (PriLOSEC) 40 MG capsule  Self No No   Sig: TAKE 1 CAPSULE BY MOUTH TWICE A DAY   oxygen gas  Self Yes No   Sig: Inhale 2 L/min continuous. Indications: copd   sertraline (ZOLOFT) 100 mg tablet   No No   Sig: TAKE 1 TABLET BY MOUTH EVERY DAY   simvastatin (ZOCOR) 40 mg tablet  Self No No   Sig: TAKE 1 TABLET BY MOUTH EVERYDAY AT BEDTIME   triamcinolone (KENALOG) 0.1 % cream  Self No No   Sig: Apply topically 2 (two) times a day   warfarin (COUMADIN) 5 mg tablet   No No   Sig: TAKE 2 TABLETS (10 MG) TODAY AND TOMORROW      Facility-Administered Medications: None       Past Medical History:   Diagnosis Date    Anemia 2018    Anxiety     Arthritis     AVB (atrioventricular block)     first degree    Cataract     CHF (congestive heart failure) ([facility])     COPD, mild ([facility])     Coronary artery disease     Dislocation of right shoulder joint     Frequent UTI     GERD (gastroesophageal reflux disease)     H/O: pneumonia     Heme positive stool     Hyperlipidemia     Hypertension     Hypothyroidism     Morbid obesity with BMI of 50.0-59.9, adult ([facility])     Obesity, morbid ([facility]) 2018    JANICE on CPAP     Pulmonary hypertension ([facility]) 2018    Severe aortic stenosis     Simple goiter     Skin cyst     within the armpits, right    Wears glasses        Past Surgical History:    Procedure Laterality Date    BREAST BIOPSY      CARDIAC CATHETERIZATION      CARPAL TUNNEL RELEASE Bilateral     CHOLECYSTECTOMY      DILATION AND CURETTAGE OF UTERUS      HYSTEROSCOPY      MASTOID SURGERY      IA COLONOSCOPY FLX DX W/COLLJ SPEC WHEN PFRMD N/A 9/6/2018    Procedure: COLONOSCOPY;  Surgeon: Shree Sosa III, MD;  Location: [facility];  Service: Gastroenterology    IA ECHO TRANSESOPHAG R-T 2D W/PRB IMG ACQUISJ I&R N/A 10/9/2018    Procedure: INTRA-OP TRANSESOPHAGEAL ECHOCARDIOGRAM (GARRISON);  Surgeon: Kushal Camarena DO;  Location: [facility];  Service: Cardiac Surgery    IA ESOPHAGOGASTRODUODENOSCOPY TRANSORAL DIAGNOSTIC N/A 8/31/2018    Procedure: ESOPHAGOGASTRODUODENOSCOPY (EGD);  Surgeon: Shree Sosa III, MD;  Location: [facility];  Service: Gastroenterology    IA REPLACE AORTIC VALVE OPENFEMORAL ARTERY APPROACH N/A 10/9/2018    Procedure: REPLACEMENT AORTIC VALVE TRANSCATHETER (TAVR) TRANSFEMORAL W/ 23 MM MENDOZA NOE S3 VALVE (ACCESS OF LEFT);  Surgeon: Kushal Camarena DO;  Location: [facility];  Service: Cardiac Surgery    TOTAL HIP ARTHROPLASTY Left 2007    TOTAL KNEE ARTHROPLASTY Bilateral        Family History   Problem Relation Age of Onset    Diabetes Mother     Stroke Mother     Cancer Father     Lung cancer Father     Diabetes Sister     Heart disease Sister     Hypertension Sister     Coronary artery disease Family     Diabetes Family     Hypertension Family     Cancer Family     Stroke Family     Thyroid disease Neg Hx      I have reviewed and agree with the history as documented.    E-Cigarette/Vaping    E-Cigarette Use Never User      E-Cigarette/Vaping Substances    Nicotine No     THC No     CBD No     Flavoring No     Other No     Unknown No      Social History     Tobacco Use    Smoking status: Never    Smokeless tobacco: Never   Vaping Use    Vaping status: Never Used   Substance Use Topics    Alcohol use: No    Drug use: No       Review of Systems   Genitourinary:   Positive for flank pain.   Musculoskeletal:  Positive for back pain.   Neurological:  Negative for weakness and numbness.       Physical Exam  Physical Exam  Vitals and nursing note reviewed.   Constitutional:       Appearance: She is well-developed.   HENT:      Head: Normocephalic.      Right Ear: External ear normal.      Left Ear: External ear normal.      Nose: Nose normal.      Mouth/Throat:      Mouth: Mucous membranes are moist.      Pharynx: Oropharynx is clear.   Eyes:      General: Lids are normal.      Extraocular Movements: Extraocular movements intact.      Pupils: Pupils are equal, round, and reactive to light.   Neck:      Comments: No midline tenderness but due to age she is distracted so we will CAT scan  Cardiovascular:      Rate and Rhythm: Normal rate and regular rhythm.      Pulses: Normal pulses.      Heart sounds: Normal heart sounds.   Pulmonary:      Effort: Pulmonary effort is normal. No respiratory distress.   Abdominal:      General: Abdomen is flat. Bowel sounds are normal.      Tenderness: There is abdominal tenderness.      Comments: There is some right flank tenderness there is some right lower abdominal tenderness which is mild, range of motion is possible patient sits up comfortably on her own.   Musculoskeletal:         General: No deformity. Normal range of motion.      Cervical back: Normal range of motion and neck supple.   Skin:     General: Skin is warm and dry.   Neurological:      Mental Status: She is alert and oriented to person, place, and time.   Psychiatric:         Mood and Affect: Mood normal.         Vital Signs  ED Triage Vitals   Temp Pulse Respirations Blood Pressure SpO2   -- 04/06/24 1300 04/06/24 1300 04/06/24 1300 04/06/24 1300    56 18 96/67 94 %      Temp src Heart Rate Source Patient Position - Orthostatic VS BP Location FiO2 (%)   -- -- 04/06/24 1300 04/06/24 1300 --     Lying Left arm       Pain Score       04/06/24 1402       No Pain           Vitals:     04/06/24 1300 04/06/24 1345   BP: 96/67 148/72   Pulse: 56 74   Patient Position - Orthostatic VS: Lying          Visual Acuity      ED Medications  Medications   iohexol (OMNIPAQUE) 350 MG/ML injection (MULTI-DOSE) 100 mL (100 mL Intravenous Given 4/6/24 1330)       Diagnostic Studies  Results Reviewed       Procedure Component Value Units Date/Time    Basic metabolic panel [[id number]]  (Abnormal) Collected: 04/06/24 1313    Lab Status: Final result Specimen: Blood from Arm, Left Updated: 04/06/24 1349     Sodium 137 mmol/L      Potassium 4.6 mmol/L      Chloride 103 mmol/L      CO2 29 mmol/L      ANION GAP 5 mmol/L      BUN 27 mg/dL      Creatinine 0.85 mg/dL      Glucose 135 mg/dL      Calcium 10.5 mg/dL      eGFR 63 ml/min/1.73sq m     Narrative:      National Kid[employer] Foundation guidelines for Chronic Kidney Disease (CKD):     Stage 1 with normal or high GFR (GFR > 90 mL/min/1.73 square meters)    Stage 2 Mild CKD (GFR = 60-89 mL/min/1.73 square meters)    Stage 3A Moderate CKD (GFR = 45-59 mL/min/1.73 square meters)    Stage 3B Moderate CKD (GFR = 30-44 mL/min/1.73 square meters)    Stage 4 Severe CKD (GFR = 15-29 mL/min/1.73 square meters)    Stage 5 End Stage CKD (GFR <15 mL/min/1.73 square meters)  Note: GFR calculation is accurate only with a steady state creatinine    Hepatic function panel [[id number]]  (Normal) Collected: 04/06/24 1313    Lab Status: Final result Specimen: Blood from Arm, Left Updated: 04/06/24 1349     Total Bilirubin 0.62 mg/dL      Bilirubin, Direct 0.15 mg/dL      Alkaline Phosphatase 67 U/L      AST 27 U/L      ALT 18 U/L      Total Protein 7.5 g/dL      Albumin 4.3 g/dL     Protime-INR [[id number]]  (Abnormal) Collected: 04/06/24 1313    Lab Status: Final result Specimen: Blood from Arm, Left Updated: 04/06/24 1336     Protime 19.4 seconds      INR 1.56    CBC and differential [[id number]]  (Abnormal) Collected: 04/06/24 1313    Lab Status: Final result Specimen: Blood from  Arm, Left Updated: 04/06/24 1320     WBC 7.90 Thousand/uL      RBC 4.79 Million/uL      Hemoglobin 13.0 g/dL      Hematocrit 42.1 %      MCV 88 fL      MCH 27.1 pg      MCHC 30.9 g/dL      RDW 17.2 %      MPV 9.8 fL      Platelets 317 Thousands/uL      nRBC 0 /100 WBCs      Neutrophils Relative 63 %      Immature Grans % 1 %      Lymphocytes Relative 21 %      Monocytes Relative 8 %      Eosinophils Relative 6 %      Basophils Relative 1 %      Neutrophils Absolute 5.08 Thousands/µL      Absolute Immature Grans 0.04 Thousand/uL      Absolute Lymphocytes 1.68 Thousands/µL      Absolute Monocytes 0.62 Thousand/µL      Eosinophils Absolute 0.44 Thousand/µL      Basophils Absolute 0.04 Thousands/µL                    CT head without contrast   Final Result by Christopher Cohen MD (04/06 4146)      No acute intracranial abnormality.  Chronic microangiopathic changes.                  Workstation performed: [id number]         CT chest abdomen pelvis w contrast   Final Result by Christopher Cohen MD (04/06 3901)   No acute posttraumatic injury throughout the chest, abdomen and pelvis.                  Workstation performed: [id number]         CT spine cervical without contrast   Final Result by Christopher Cohen MD (04/06 1682)      No cervical spine fracture or traumatic malalignment.                  Workstation performed: [id number]         CT recon only lumbar spine   Final Result by Christopher Cohen MD (04/06 3660)      No fracture or traumatic subluxation.            Workstation performed: [id number]         XR chest 1 view portable    (Results Pending)              Procedures  ECG 12 Lead Documentation Only    Date/Time: 4/6/2024 2:09 PM    Performed by: Adalberto Medrano MD  Authorized by: Adalberto Medrano MD    Indications / Diagnosis:  Fall on Coumadin  ECG reviewed by me, the ED Provider: yes    Patient location:  ED  Previous ECG:     Previous ECG:  Unavailable  Interpretation:     Interpretation: abnormal    Rate:     ECG rate:  80    ECG  rate assessment: normal    Rhythm:     Rhythm: sinus rhythm    Comments:      Atrial flutter versus atrial fibrillation which is chronic, no acute ST-T wave changes           ED Course           Chest x-ray: Chest x-ray showed a normal cardiac silhouette, no pneumothorax no infiltrates, No sign of pathology, interpreted by me, I was the primary .      Electrolytes were within normal limits, other than a BUN of 27 minimally elevated,  Liver functions were normal no sign of hepatitis  INR was elevated 1.56 consistent with her Coumadin  White count was normal at 7.9 no sign of inflammation hemoglobin is 13 no sign of anemia.  CT scans of the brain and cervical spine abdomen pelvis and lumbar spine showed no fracture.      Patient was able to ambulate here with a walker prior to discharge.        SBIRT 20yo+      Flowsheet Row Most Recent Value   Initial Alcohol Screen: US AUDIT-C     1. How often do you have a drink containing alcohol? 0 Filed at: 04/06/2024 1303   2. How many drinks containing alcohol do you have on a typical day you are drinking?  0 Filed at: 04/06/2024 1303   3b. FEMALE Any Age, or MALE 65+: How often do you have 4 or more drinks on one occassion? 0 Filed at: 04/06/2024 1303   Audit-C Score 0 Filed at: 04/06/2024 1303   SHAWN: How many times in the past year have you...    Used an illegal drug or used a prescription medication for non-medical reasons? Never Filed at: 04/06/2024 1303                      Medical Decision Making  Medical decision making 83-year-old female presents emergency department on Coumadin reports falling to the right-hand side head strike and some flank and low back pain.  CT scanning showed no acute bleeding.  Patient was able to ambulate here with a walker.  Discussed outpatient treatment and follow-up discussed indications to return advised Tylenol for pain.    Amount and/or Complexity of Data Reviewed  Labs: ordered.  Radiology: ordered.    Risk  Prescription  drug management.             Disposition  Final diagnoses:   Injury of head, initial encounter   Contusion of flank, initial encounter     Time reflects when diagnosis was documented in both MDM as applicable and the Disposition within this note       Time User Action Codes Description Comment    4/6/2024  2:32 PM Adalberto Medrano [S09.90XA] Injury of head, initial encounter     4/6/2024  2:32 PM Adalberto Medrano [S30.1XXA] Contusion of flank, initial encounter           ED Disposition       ED Disposition   Discharge    Condition   Stable    Date/Time   Sat Apr 6, 2024 1432    Comment   Marisol Singhine discharge to home/self care.                   Follow-up Information       Follow up With Specialties Details Why Contact Info    [facility]   [address] [id number] [phone]              Discharge Medication List as of 4/6/2024  2:32 PM        CONTINUE these medications which have NOT CHANGED    Details   acetaminophen (TYLENOL) 500 mg tablet Take 500 mg by mouth every 6 (six) hours as needed, Historical Med      aspirin (ECOTRIN LOW STRENGTH) 81 mg EC tablet Take 1 tablet (81 mg total) by mouth daily, Starting Thu 10/11/2018, Print      b complex vitamins capsule Take 1 capsule by mouth 2 (two) times a day  , Historical Med      Calcium Carb-Cholecalciferol (CALCIUM 600 + D PO) Take 1 tablet by mouth 2 (two) times a day, Historical Med      Cranberry 250 MG TABS Take by mouth, Historical Med      Fesoterodine Fumarate ER (Toviaz) 8 MG TB24 Take 8 mg by mouth daily. Not taking  Indications: Urinary Incontinence, Historical Med      furosemide (LASIX) 20 mg tablet Take 1 tablet (20 mg total) by mouth daily as needed (wt gain), Starting Fri 8/18/2023, No Print      hydrocortisone 2.5 % cream Apply topically 2 (two) times a day, Starting Wed 1/10/2024, Normal      Iron-Vitamin C (Vitron-C)  MG TABS Take 1 tablet by mouth in the morning, Starting Fri 3/22/2024, Normal       levothyroxine 50 mcg tablet Take 1 tablet (50 mcg total) by mouth daily, Starting Mon 2/19/2024, Normal      methocarbamol (ROBAXIN) 500 mg tablet Take 1 tablet (500 mg total) by mouth 3 (three) times a day, Starting Tue 10/10/2023, Normal      Myrbetriq 50 MG TB24 Take 1 tablet by mouth daily, Starting Wed 5/31/2023, Historical Med      ofloxacin (OCUFLOX) 0.3 % ophthalmic solution 5 drops twice daily to left ear x 10 days., Normal      olmesartan (BENICAR) 5 mg tablet TAKE 2 TABLETS BY MOUTH EVERY DAY, Normal      Omega-3 Fatty Acids (Fish Oil) 300 MG CAPS Take by mouth, Historical Med      omeprazole (PriLOSEC) 40 MG capsule TAKE 1 CAPSULE BY MOUTH TWICE A DAY, Normal      oxygen gas Inhale 2 L/min continuous. Indications: copd, Historical Med      sertraline (ZOLOFT) 100 mg tablet TAKE 1 TABLET BY MOUTH EVERY DAY, Normal      simvastatin (ZOCOR) 40 mg tablet TAKE 1 TABLET BY MOUTH EVERYDAY AT BEDTIME, Normal      triamcinolone (KENALOG) 0.1 % cream Apply topically 2 (two) times a day, Starting Thu 1/25/2024, Normal      Turmeric (QC Tumeric Complex) 500 MG CAPS Take by mouth, Historical Med      warfarin (COUMADIN) 5 mg tablet TAKE 2 TABLETS (10 MG) TODAY AND TOMORROW, Normal             No discharge procedures on file.    PDMP Review         Value Time User    PDMP Reviewed  Yes 8/18/2023 11:22 AM Anjel Villar DO            ED Provider  Electronically Signed by             Adalberto Medrano MD  04/06/24 2676

## 2024-04-06 NOTE — DISCHARGE INSTRUCTIONS
Rest  Tylenol as needed  Follow-up with your provider return increasing pain difficulty breathing or any problems

## 2024-04-08 ENCOUNTER — VBI (OUTPATIENT)
Dept: FAMILY MEDICINE CLINIC | Facility: CLINIC | Age: 84
End: 2024-04-08

## 2024-04-08 DIAGNOSIS — I10 ESSENTIAL HYPERTENSION: ICD-10-CM

## 2024-04-08 RX ORDER — OLMESARTAN MEDOXOMIL 5 MG/1
TABLET ORAL
Qty: 180 TABLET | Refills: 3 | Status: SHIPPED | OUTPATIENT
Start: 2024-04-08

## 2024-04-08 NOTE — TELEPHONE ENCOUNTER
04/08/24 11:42 AM    Patient contacted post ED visit, outreach attempt made but message could not be left. Additional outreach attempt will be made.     Thank you.  Frieda Moran  [facility]

## 2024-04-09 NOTE — TELEPHONE ENCOUNTER
04/09/24 9:08 AM    Patient contacted post ED visit, outreach attempt made but message could not be left. Additional outreach attempt will be made.     Thank you.  Frieda Moran  [facility]

## 2024-04-10 NOTE — TELEPHONE ENCOUNTER
04/10/24 10:27 AM    Patient contacted post ED visit, phone outreaches were unsuccessful; patient does not have MyChart, a MyChart letter has not been sent.     Thank you.  Frieda Moran  [facility]

## 2024-04-11 ENCOUNTER — LAB (OUTPATIENT)
Dept: LAB | Facility: HOSPITAL | Age: 84
End: 2024-04-11
Attending: INTERNAL MEDICINE
Payer: MEDICARE

## 2024-04-11 ENCOUNTER — TELEPHONE (OUTPATIENT)
Age: 84
End: 2024-04-11

## 2024-04-11 DIAGNOSIS — Z79.01 LONG TERM (CURRENT) USE OF ANTICOAGULANTS: ICD-10-CM

## 2024-04-11 LAB
ATRIAL RATE: 468 BPM
INR PPP: 2.03 (ref 0.84–1.19)
P AXIS: 47 DEGREES
PROTHROMBIN TIME: 22.6 SECONDS (ref 11.6–14.5)
QRS AXIS: 200 DEGREES
QRSD INTERVAL: 86 MS
QT INTERVAL: 392 MS
QTC INTERVAL: 452 MS
T WAVE AXIS: 41 DEGREES
VENTRICULAR RATE: 80 BPM

## 2024-04-11 PROCEDURE — 85610 PROTHROMBIN TIME: CPT

## 2024-04-11 PROCEDURE — 36415 COLL VENOUS BLD VENIPUNCTURE: CPT

## 2024-04-12 ENCOUNTER — TELEPHONE (OUTPATIENT)
Dept: CARDIOLOGY CLINIC | Facility: CLINIC | Age: 84
End: 2024-04-12

## 2024-04-12 ENCOUNTER — ANTICOAG VISIT (OUTPATIENT)
Dept: CARDIOLOGY CLINIC | Facility: CLINIC | Age: 84
End: 2024-04-12

## 2024-04-12 NOTE — TELEPHONE ENCOUNTER
Hello,  Requesting a pt/inr home draw to be done around 4/25. Please call the pt to set up. Script is in the chart.     Thank you.   Ani

## 2024-04-15 ENCOUNTER — TELEPHONE (OUTPATIENT)
Dept: LAB | Facility: HOSPITAL | Age: 84
End: 2024-04-15

## 2024-04-15 ENCOUNTER — TELEPHONE (OUTPATIENT)
Age: 84
End: 2024-04-15

## 2024-04-15 NOTE — TELEPHONE ENCOUNTER
Patient calling requesting to speak to Ani.       PT stated that the mobile lab is schedule to come out on 4/25 & pt is scheduled for another appt that day & would like the mobile lab r/s.     PT would like appt to be rescheduled      # [phone]

## 2024-04-16 NOTE — TELEPHONE ENCOUNTER
Re-scheduled pt appt for 5/2 between 10 and 12 .   Called pt to make her aware of changes - DAVINA @[phone]

## 2024-04-16 NOTE — TELEPHONE ENCOUNTER
Please see attached note. Pt needs to rsc her mobile lab appt for 4/25. Can someone please reach out to her to reschedule? It can be the week of 4/29.   Thank you.

## 2024-04-22 ENCOUNTER — OFFICE VISIT (OUTPATIENT)
Dept: PAIN MEDICINE | Facility: CLINIC | Age: 84
End: 2024-04-22
Payer: MEDICARE

## 2024-04-22 VITALS
SYSTOLIC BLOOD PRESSURE: 90 MMHG | HEIGHT: 55 IN | BODY MASS INDEX: 42.58 KG/M2 | WEIGHT: 184 LBS | HEART RATE: 112 BPM | DIASTOLIC BLOOD PRESSURE: 60 MMHG

## 2024-04-22 DIAGNOSIS — G89.29 CHRONIC BILATERAL LOW BACK PAIN WITH LEFT-SIDED SCIATICA: ICD-10-CM

## 2024-04-22 DIAGNOSIS — G89.4 CHRONIC PAIN SYNDROME: ICD-10-CM

## 2024-04-22 DIAGNOSIS — M54.42 CHRONIC BILATERAL LOW BACK PAIN WITH LEFT-SIDED SCIATICA: ICD-10-CM

## 2024-04-22 DIAGNOSIS — M54.16 LUMBAR RADICULOPATHY: ICD-10-CM

## 2024-04-22 DIAGNOSIS — M48.062 SPINAL STENOSIS OF LUMBAR REGION WITH NEUROGENIC CLAUDICATION: Primary | ICD-10-CM

## 2024-04-22 PROCEDURE — 99214 OFFICE O/P EST MOD 30 MIN: CPT | Performed by: STUDENT IN AN ORGANIZED HEALTH CARE EDUCATION/TRAINING PROGRAM

## 2024-04-22 RX ORDER — OXYCODONE HYDROCHLORIDE 5 MG/1
2.5 TABLET ORAL 2 TIMES DAILY PRN
Qty: 15 TABLET | Refills: 0 | Status: SHIPPED | OUTPATIENT
Start: 2024-04-22

## 2024-04-22 NOTE — PROGRESS NOTES
Pain Medicine Follow-Up Note    Assessment:  1. Spinal stenosis of lumbar region with neurogenic claudication    2. Lumbar radiculopathy    3. Chronic pain syndrome    4. Chronic bilateral low back pain with left-sided sciatica        Plan:  Orders Placed This Encounter   Procedures    FL spine and pain procedure     Standing Status:   Future     Standing Expiration Date:   4/22/2028     Order Specific Question:   Reason for Exam:     Answer:   Left L4 and L5 TFESI depo     Order Specific Question:   Anticoagulant hold needed?     Answer:   warfarin       New Medications Ordered This Visit   Medications    oxyCODONE (Roxicodone) 5 immediate release tablet     Sig: Take 0.5 tablets (2.5 mg total) by mouth 2 (two) times a day as needed for moderate pain Max Daily Amount: 5 mg     Dispense:  15 tablet     Refill:  0       My impressions and treatment recommendations were discussed in detail with the patient who verbalized understanding and had no further questions.      83-year-old female returns her office with increased back pain with left lower extremity radicular symptoms.  She recently aggravated her symptoms after a fall.  We discussed left L4 and L5 transforaminal epidurals to injection to help with her symptoms.  Previously L5-S1 LESI was only mildly helpful.    I am also going to reorder for her a short course of oxycodone 2.5mg bid prn. She tolerated this in the past and it was helpful.     [place] Prescription Drug Monitoring Program report was reviewed and was appropriate         Complete risks and benefits including bleeding, infection, tissue reaction, nerve injury and allergic reaction were discussed. The approach was demonstrated using models and literature was provided. Verbal and written consent was obtained.      Discharge instructions were provided. I personally saw and examined the patient and I agree with the above discussed plan of care.    History of Present Illness:    Marisol Otoole is a  83 y.o. female who presents to [facility] for interval re-evaluation of the above stated pain complaints. The patient has a past medical and chronic pain history as outlined in the assessment section.     She is here with low back pain into the left leg.  Patient had a notable fall on 4/6/2024 where she fell reaching for her Bible and fell on her right side, though was having left-sided flank and back pain when she presented to the ED..      Other than as stated above, the patient denies any interval changes in medications, medical condition, mental condition, symptoms, or allergies since the last office visit.         Review of Systems:    Review of Systems   Musculoskeletal:  Positive for arthralgias, back pain, gait problem and myalgias.        DROM         Past Medical History:   Diagnosis Date    Anemia 08/22/2018    Anxiety     Arthritis     AVB (atrioventricular block)     first degree    Cataract     CHF (congestive heart failure) ([facility])     COPD, mild (HCC)     Coronary artery disease     Dislocation of right shoulder joint     Frequent UTI     GERD (gastroesophageal reflux disease)     H/O: pneumonia     Heme positive stool     Hyperlipidemia     Hypertension     Hypothyroidism     Morbid obesity with BMI of 50.0-59.9, adult (HCC)     Obesity, morbid (HCC) 08/22/2018    JANICE on CPAP     Pulmonary hypertension ([facility]) 08/22/2018    Severe aortic stenosis     Simple goiter     Skin cyst     within the armpits, right    Wears glasses        Past Surgical History:   Procedure Laterality Date    BREAST BIOPSY      CARDIAC CATHETERIZATION      CARPAL TUNNEL RELEASE Bilateral     CHOLECYSTECTOMY      DILATION AND CURETTAGE OF UTERUS      HYSTEROSCOPY      MASTOID SURGERY      VT COLONOSCOPY FLX DX W/COLLJ SPEC WHEN PFRMD N/A 9/6/2018    Procedure: COLONOSCOPY;  Surgeon: Shree Sosa III, MD;  [facility]: [facility];  Service: Gastroenterology    VT ECHO TRANSESOPHAG R-T 2D W/PRB IMG ACQUISJ  I&R N/A 10/9/2018    Procedure: INTRA-OP TRANSESOPHAGEAL ECHOCARDIOGRAM (GARRISON);  Surgeon: Kushal Camarena DO;  [facility]: [facility];  Service: Cardiac Surgery    TN ESOPHAGOGASTRODUODENOSCOPY TRANSORAL DIAGNOSTIC N/A 8/31/2018    Procedure: ESOPHAGOGASTRODUODENOSCOPY (EGD);  Surgeon: Shree Sosa III, MD;  [facility]: [facility];  Service: Gastroenterology    TN REPLACE AORTIC VALVE OPENFEMORAL ARTERY APPROACH N/A 10/9/2018    Procedure: REPLACEMENT AORTIC VALVE TRANSCATHETER (TAVR) TRANSFEMORAL W/ 23 MM MENDOZA NOE S3 VALVE (ACCESS OF LEFT);  Surgeon: Kushal Camarena DO;  [facility];  Service: Cardiac Surgery    TOTAL HIP ARTHROPLASTY Left 2007    TOTAL KNEE ARTHROPLASTY Bilateral        Family History   Problem Relation Age of Onset    Diabetes Mother     Stroke Mother     Cancer Father     Lung cancer Father     Diabetes Sister     Heart disease Sister     Hypertension Sister     Coronary artery disease Family     Diabetes Family     Hypertension Family     Cancer Family     Stroke Family     Thyroid disease Neg Hx        Social History     Occupational History    Occupation: retired   Tobacco Use    Smoking status: Never    Smokeless tobacco: Never   Vaping Use    Vaping status: Never Used   Substance and Sexual Activity    Alcohol use: No    Drug use: No    Sexual activity: Never         Current Outpatient Medications:     acetaminophen (TYLENOL) 500 mg tablet, Take 500 mg by mouth every 6 (six) hours as needed, Disp: , Rfl:     aspirin (ECOTRIN LOW STRENGTH) 81 mg EC tablet, Take 1 tablet (81 mg total) by mouth daily, Disp: 100 tablet, Rfl: 0    b complex vitamins capsule, Take 1 capsule by mouth 2 (two) times a day  , Disp: , Rfl:     Calcium Carb-Cholecalciferol (CALCIUM 600 + D PO), Take 1 tablet by mouth 2 (two) times a day, Disp: , Rfl:     Cranberry 250 MG TABS, Take by mouth, Disp: , Rfl:     Fesoterodine Fumarate ER (Toviaz) 8 MG TB24, Take 8 mg by mouth daily. Not taking   "Indications: Urinary Incontinence, Disp: , Rfl:     furosemide (LASIX) 20 mg tablet, Take 1 tablet (20 mg total) by mouth daily as needed (wt gain), Disp: 30 tablet, Rfl: 0    hydrocortisone 2.5 % cream, Apply topically 2 (two) times a day, Disp: 28 g, Rfl: 1    Iron-Vitamin C (Vitron-C)  MG TABS, Take 1 tablet by mouth in the morning, Disp: 90 tablet, Rfl: 1    levothyroxine 50 mcg tablet, Take 1 tablet (50 mcg total) by mouth daily, Disp: 90 tablet, Rfl: 1    Myrbetriq 50 MG TB24, Take 1 tablet by mouth daily, Disp: , Rfl:     ofloxacin (OCUFLOX) 0.3 % ophthalmic solution, 5 drops twice daily to left ear x 10 days., Disp: 10 mL, Rfl: 1    olmesartan (BENICAR) 5 mg tablet, TAKE 2 TABLETS BY MOUTH EVERY DAY, Disp: 180 tablet, Rfl: 3    Omega-3 Fatty Acids (Fish Oil) 300 MG CAPS, Take by mouth, Disp: , Rfl:     omeprazole (PriLOSEC) 40 MG capsule, TAKE 1 CAPSULE BY MOUTH TWICE A DAY, Disp: 180 capsule, Rfl: 1    oxyCODONE (Roxicodone) 5 immediate release tablet, Take 0.5 tablets (2.5 mg total) by mouth 2 (two) times a day as needed for moderate pain Max Daily Amount: 5 mg, Disp: 15 tablet, Rfl: 0    oxygen gas, Inhale 2 L/min continuous. Indications: copd, Disp: , Rfl:     sertraline (ZOLOFT) 100 mg tablet, TAKE 1 TABLET BY MOUTH EVERY DAY, Disp: 90 tablet, Rfl: 1    simvastatin (ZOCOR) 40 mg tablet, TAKE 1 TABLET BY MOUTH EVERYDAY AT BEDTIME, Disp: 90 tablet, Rfl: 1    triamcinolone (KENALOG) 0.1 % cream, Apply topically 2 (two) times a day, Disp: 15 g, Rfl: 1    Turmeric (QC Tumeric Complex) 500 MG CAPS, Take by mouth, Disp: , Rfl:     warfarin (COUMADIN) 5 mg tablet, TAKE 2 TABLETS (10 MG) TODAY AND TOMORROW, Disp: 180 tablet, Rfl: 3    Allergies   Allergen Reactions    Latex Rash    Neosporin [Neomycin-Bacitracin Zn-Polymyx] Rash and Other (See Comments)     hives per [facility] order       Physical Exam:    BP 90/60 (BP Location: Left arm)   Pulse (!) 112   Ht 4' 7\" (1.397 m)   Wt 83.5 kg (184 lb)   BMI " 42.77 kg/m²     Constitutional:normal, well developed, well nourished, alert, in no distress and non-toxic and no overt pain behavior.  Eyes:anicteric  HEENT:grossly intact  Neck:supple, symmetric, trachea midline and no masses   Pulmonary:even and unlabored  Cardiovascular:No edema or pitting edema present  Skin:Normal without rashes or lesions and well hydrated  Psychiatric:Mood and affect appropriate  Neurologic:Cranial Nerves II-XII grossly intact  Musculoskeletal:normal      Imaging  FL spine and pain procedure    (Results Pending)         Orders Placed This Encounter   Procedures    FL spine and pain procedure

## 2024-04-23 ENCOUNTER — TELEPHONE (OUTPATIENT)
Dept: CARDIOLOGY CLINIC | Facility: CLINIC | Age: 84
End: 2024-04-23

## 2024-04-23 NOTE — TELEPHONE ENCOUNTER
Caller: Marisol    Doctor/Office: Jalyn    Call regarding :  Coumadin question     Call was transferred to: [facility]

## 2024-04-24 ENCOUNTER — TELEPHONE (OUTPATIENT)
Dept: RADIOLOGY | Facility: CLINIC | Age: 84
End: 2024-04-24

## 2024-04-24 NOTE — TELEPHONE ENCOUNTER
Pt scheduled for TFESI with Dr Montero on 5/23/24    Pt reports she takes warfarin, prescribed by cardiology (Dr Esteves).  (Pt also reports taking 81mg aspirin, prescribed by hospitalist.)    Pt given instructions in office, no myc for follow up message.    Have you completed PT/HEP/Chiro in the past 6 months for dedicated area? For relevant issue, no  If yes, how long did you complete?  What was the frequency?  Did it provide relief?  If no, reason therapy was not completed? Previous injections completed

## 2024-04-25 NOTE — TELEPHONE ENCOUNTER
Pt of Dr Esteves,    This mutual patient is to undergo a Transforaminal Epidural Steroid Injection with Dr Montero  For this procedure she will need to hold her Coumadin x 5 days and resume post-op day of.  INR Target is 1.2 or less.    Do you give permission for this hold?  If granted, permission will be valid for only 60 days

## 2024-04-29 DIAGNOSIS — Z79.01 ON CONTINUOUS ORAL ANTICOAGULATION: Primary | ICD-10-CM

## 2024-04-29 NOTE — TELEPHONE ENCOUNTER
Please place order for PT/INR 5/22    Will advise pt of coumadin hold and INR instructions  TFESI scheduled 5/23

## 2024-05-02 ENCOUNTER — LAB (OUTPATIENT)
Dept: LAB | Facility: HOSPITAL | Age: 84
End: 2024-05-02
Payer: MEDICARE

## 2024-05-02 DIAGNOSIS — Z79.01 ON CONTINUOUS ORAL ANTICOAGULATION: ICD-10-CM

## 2024-05-02 LAB
INR PPP: 2.05 (ref 0.84–1.19)
PROTHROMBIN TIME: 22.7 SECONDS (ref 11.6–14.5)

## 2024-05-02 PROCEDURE — 85610 PROTHROMBIN TIME: CPT

## 2024-05-02 PROCEDURE — 36415 COLL VENOUS BLD VENIPUNCTURE: CPT

## 2024-05-03 ENCOUNTER — ANTICOAG VISIT (OUTPATIENT)
Dept: CARDIOLOGY CLINIC | Facility: CLINIC | Age: 84
End: 2024-05-03

## 2024-05-03 ENCOUNTER — TELEPHONE (OUTPATIENT)
Dept: CARDIOLOGY CLINIC | Facility: CLINIC | Age: 84
End: 2024-05-03

## 2024-05-03 NOTE — TELEPHONE ENCOUNTER
Hi,  Requesting a pt/inr home draw to be done around 5/16. Please call the pt to set up. Script is in the chart.     Thank you.

## 2024-05-03 NOTE — PROGRESS NOTES
Pt is having a procedure on 5/23 and will be holding so she will test in 2 weeks prior to the hold

## 2024-05-03 NOTE — RESULT ENCOUNTER NOTE
Pt is having a procedure in 3 weeks 5/23 and will be holding so she will test in 2 weeks prior to the hold

## 2024-05-08 ENCOUNTER — TELEPHONE (OUTPATIENT)
Age: 84
End: 2024-05-08

## 2024-05-08 NOTE — LETTER
May 9, 2024      Patient:            Marisol Otoole  YOB: 1940           To Whom it May Concern:     Marisol Otoole is under my professional care. Marisol may take ibuprofen 400 mg TID prn by mouth but preferably not more than 5-7 doses per week.     If you have any questions or concerns, please don't hesitate to call.           Sincerely,            Bro Esteves MD           CC: No Recipients

## 2024-05-08 NOTE — TELEPHONE ENCOUNTER
Adrienne called from [facility] stating that pt made her aware she is allowed to take ibuprofen.     Per telephone encounter on 3/22 pt was instructed that she can only take ibupofen a few times a week, not on a regular basis.     Adrienne is asking for an order to be faxed to [phone] with how often patient can take and the dosage of ibuprofen allowed.     Please advise

## 2024-05-08 NOTE — LETTER
May 8, 2024     Patient: Marisol Otoole  YOB: 1940        To Whom it May Concern:    Marisol Otoole is under my professional care. Marisol may take ibuprofen 400 mg by mouth. but not more than 5-7 doses per week    If you have any questions or concerns, please don't hesitate to call.         Sincerely,          Holley Charles LPN        CC: No Recipients

## 2024-05-15 ENCOUNTER — NURSE TRIAGE (OUTPATIENT)
Age: 84
End: 2024-05-15

## 2024-05-15 ENCOUNTER — TELEPHONE (OUTPATIENT)
Dept: CARDIOLOGY CLINIC | Facility: CLINIC | Age: 84
End: 2024-05-15

## 2024-05-15 ENCOUNTER — APPOINTMENT (OUTPATIENT)
Dept: LAB | Facility: HOSPITAL | Age: 84
End: 2024-05-15
Attending: INTERNAL MEDICINE
Payer: MEDICARE

## 2024-05-15 ENCOUNTER — TELEPHONE (OUTPATIENT)
Age: 84
End: 2024-05-15

## 2024-05-15 DIAGNOSIS — Z79.01 LONG TERM (CURRENT) USE OF ANTICOAGULANTS: ICD-10-CM

## 2024-05-15 LAB
INR PPP: 2.37 (ref 0.84–1.19)
PROTHROMBIN TIME: 25.4 SECONDS (ref 11.6–14.5)

## 2024-05-15 PROCEDURE — 36415 COLL VENOUS BLD VENIPUNCTURE: CPT

## 2024-05-15 PROCEDURE — 85610 PROTHROMBIN TIME: CPT

## 2024-05-15 NOTE — TELEPHONE ENCOUNTER
----- Message from Denise BELLO sent at 5/15/2024 10:44 AM EDT -----  Patient is questioning whether she can take fish oil pills with coumadin. Patient sees Dr Esteves as her Cardiologist. Please call her at [phone].

## 2024-05-15 NOTE — TELEPHONE ENCOUNTER
Adrienne at [facility] called to make Diya Schrader aware that patient is discharging herself from Home Health in 2 weeks.

## 2024-05-15 NOTE — TELEPHONE ENCOUNTER
Pt calling to schedule follow up with Dr. Esteves for July. Unable to do so due to no template. Patient will call back.

## 2024-05-16 ENCOUNTER — ANTICOAG VISIT (OUTPATIENT)
Dept: CARDIOLOGY CLINIC | Facility: CLINIC | Age: 84
End: 2024-05-16

## 2024-05-21 ENCOUNTER — TELEPHONE (OUTPATIENT)
Dept: LAB | Facility: HOSPITAL | Age: 84
End: 2024-05-21

## 2024-05-21 ENCOUNTER — OFFICE VISIT (OUTPATIENT)
Dept: FAMILY MEDICINE CLINIC | Facility: CLINIC | Age: 84
End: 2024-05-21
Payer: MEDICARE

## 2024-05-21 ENCOUNTER — TELEPHONE (OUTPATIENT)
Dept: FAMILY MEDICINE CLINIC | Facility: CLINIC | Age: 84
End: 2024-05-21

## 2024-05-21 VITALS
RESPIRATION RATE: 17 BRPM | WEIGHT: 190.8 LBS | HEART RATE: 70 BPM | OXYGEN SATURATION: 98 % | SYSTOLIC BLOOD PRESSURE: 124 MMHG | BODY MASS INDEX: 44.16 KG/M2 | HEIGHT: 55 IN | DIASTOLIC BLOOD PRESSURE: 70 MMHG

## 2024-05-21 DIAGNOSIS — E03.9 ACQUIRED HYPOTHYROIDISM: ICD-10-CM

## 2024-05-21 DIAGNOSIS — F33.9 DEPRESSION, RECURRENT (HCC): ICD-10-CM

## 2024-05-21 DIAGNOSIS — I44.30 AVB (ATRIOVENTRICULAR BLOCK): ICD-10-CM

## 2024-05-21 DIAGNOSIS — Z95.2 S/P TAVR (TRANSCATHETER AORTIC VALVE REPLACEMENT): ICD-10-CM

## 2024-05-21 DIAGNOSIS — I50.32 CHRONIC DIASTOLIC (CONGESTIVE) HEART FAILURE (HCC): ICD-10-CM

## 2024-05-21 DIAGNOSIS — K21.9 GASTROESOPHAGEAL REFLUX DISEASE WITHOUT ESOPHAGITIS: ICD-10-CM

## 2024-05-21 DIAGNOSIS — D50.8 IRON DEFICIENCY ANEMIA SECONDARY TO INADEQUATE DIETARY IRON INTAKE: ICD-10-CM

## 2024-05-21 DIAGNOSIS — Z00.00 HEALTHCARE MAINTENANCE: Primary | ICD-10-CM

## 2024-05-21 DIAGNOSIS — E66.01 OBESITY, MORBID (HCC): Chronic | ICD-10-CM

## 2024-05-21 DIAGNOSIS — J44.9 COPD, MILD (HCC): ICD-10-CM

## 2024-05-21 DIAGNOSIS — N18.31 STAGE 3A CHRONIC KIDNEY DISEASE (HCC): ICD-10-CM

## 2024-05-21 PROCEDURE — G2211 COMPLEX E/M VISIT ADD ON: HCPCS | Performed by: NURSE PRACTITIONER

## 2024-05-21 PROCEDURE — 99214 OFFICE O/P EST MOD 30 MIN: CPT | Performed by: NURSE PRACTITIONER

## 2024-05-21 RX ORDER — FERROUS SULFATE 324(65)MG
TABLET, DELAYED RELEASE (ENTERIC COATED) ORAL
Qty: 90 TABLET | Refills: 1 | Status: SHIPPED | OUTPATIENT
Start: 2024-05-21

## 2024-05-21 NOTE — ASSESSMENT & PLAN NOTE
Wt Readings from Last 3 Encounters:   05/21/24 86.5 kg (190 lb 12.8 oz)   04/22/24 83.5 kg (184 lb)   03/08/24 83.5 kg (184 lb)       Continue care with cardiology.  Continue on Lasix as needed and low-sodium diet.

## 2024-05-21 NOTE — PROGRESS NOTES
Assessment/Plan:     Chronic Problems:  AVB (atrioventricular block)  Continue care with cardiology.    Chronic diastolic (congestive) heart failure (HCC)  Wt Readings from Last 3 Encounters:   05/21/24 86.5 kg (190 lb 12.8 oz)   04/22/24 83.5 kg (184 lb)   03/08/24 83.5 kg (184 lb)       Continue care with cardiology.  Continue on Lasix as needed and low-sodium diet.        COPD, mild (HCC)  Feels breathing is currently stable.    Gastroesophageal reflux disease without esophagitis  Continues on omeprazole daily.    Acquired hypothyroidism  Will obtain labs prior to next visit.  Continue on levothyroxine 50 mcg daily.    Stage 3a chronic kidney disease (HCC)  Lab Results   Component Value Date    EGFR 63 04/06/2024    EGFR 65 03/21/2024    EGFR 76 01/30/2024    CREATININE 0.85 04/06/2024    CREATININE 0.82 03/21/2024    CREATININE 0.77 01/30/2024   Advised to avoid NSAIDs.  Will follow-up with blood work in 3 months.    Depression, recurrent (HCC)  Continues on sertraline daily.  She does feel depressed at times.    Iron deficiency anemia secondary to inadequate dietary iron intake  Patient does not want to be on Vitron-C as it is not covered by insurance.  Requesting prescription iron.  Okay to take every other day.  Will obtain iron panel prior to next visit.      Visit Diagnosis:  Diagnoses and all orders for this visit:    Healthcare maintenance  -     Iron Panel (Includes Ferritin, Iron Sat%, Iron, and TIBC); Future  -     CBC and differential; Future  -     Comprehensive metabolic panel; Future  -     Lipid panel; Future  -     TSH, 3rd generation with Free T4 reflex; Future    Iron deficiency anemia secondary to inadequate dietary iron intake  -     ferrous sulfate 324 (65 Fe) mg; Take 1 tablet every other day.  -     Iron Panel (Includes Ferritin, Iron Sat%, Iron, and TIBC); Future    AVB (atrioventricular block)    Chronic diastolic (congestive) heart failure (HCC)    COPD, mild  "(HCC)    Gastroesophageal reflux disease without esophagitis    Acquired hypothyroidism    Stage 3a chronic kidney disease (HCC)    Depression, recurrent (HCC)    S/P TAVR (transcatheter aortic valve replacement)    Obesity, morbid (HCC)          Subjective:    Patient ID: Marisol Otoole is a 83 y.o. female.    Patient presents for routine follow up. Following with pain management for back and shoulders. Still in a lot of pain. Oxy is not helping. Getting steroid injection this week. She is taking 2 ibuprofen in the morning.         The following portions of the patient's history were reviewed and updated as appropriate: allergies, current medications, past family history, past medical history, past social history, past surgical history and problem list.    Review of Systems   Constitutional:  Negative for chills and fever.   HENT:  Negative for ear pain and sore throat.    Eyes:  Negative for pain and visual disturbance.   Respiratory:  Negative for cough and shortness of breath.    Cardiovascular:  Negative for chest pain and palpitations.   Gastrointestinal:  Negative for abdominal pain and vomiting.   Genitourinary:  Negative for dysuria and hematuria.   Musculoskeletal:  Positive for arthralgias and back pain.   Skin:  Negative for color change and rash.   Neurological:  Negative for dizziness, seizures, syncope, light-headedness and headaches.   Psychiatric/Behavioral:  Positive for dysphoric mood. The patient is nervous/anxious.    All other systems reviewed and are negative.        /70 (BP Location: Right arm, Patient Position: Sitting)   Pulse 70   Resp 17   Ht 4' 7\" (1.397 m)   Wt 86.5 kg (190 lb 12.8 oz)   SpO2 98%   BMI 44.35 kg/m²   Social History     Socioeconomic History    Marital status: Single     Spouse name: Not on file    Number of children: Not on file    Years of education: Not on file    Highest education level: Not on file   Occupational History    Occupation: retired   Tobacco " Use    Smoking status: Never    Smokeless tobacco: Never   Vaping Use    Vaping status: Never Used   Substance and Sexual Activity    Alcohol use: No    Drug use: No    Sexual activity: Never   Other Topics Concern    Not on file   Social History Narrative    Denied drinking coffee    Denied exercise habits    Most recent tobacco use screenin2018      Do you currently or have you served in the [place] [employer]:   No      Were you activated, into active duty, as a member of the [employer] or as a Reservist:   No          Social Determinants of Health     Financial Resource Strain: Low Risk  (10/24/2023)    Overall Financial Resource Strain (CARDIA)     Difficulty of Paying Living Expenses: Not hard at all   Food Insecurity: No Food Insecurity (8/15/2023)    Hunger Vital Sign     Worried About Running Out of Food in the Last Year: Never true     Ran Out of Food in the Last Year: Never true   Transportation Needs: No Transportation Needs (10/24/2023)    PRAPARE - Transportation     Lack of Transportation (Medical): No     Lack of Transportation (Non-Medical): No   Physical Activity: Not on file   Stress: Not on file   Social Connections: Not on file   Intimate Partner Violence: Not on file   Housing Stability: Low Risk  (8/15/2023)    Housing Stability Vital Sign     Unable to Pay for Housing in the Last Year: No     Number of Places Lived in the Last Year: 1     Unstable Housing in the Last Year: No     Past Medical History:   Diagnosis Date    Anemia 2018    Anxiety     Arthritis     AVB (atrioventricular block)     first degree    Cataract     CHF (congestive heart failure) ([facility])     COPD, mild (HCC)     Coronary artery disease     Dislocation of right shoulder joint     Frequent UTI     GERD (gastroesophageal reflux disease)     H/O: pneumonia     Heme positive stool     Hyperlipidemia     Hypertension     Hypothyroidism     Morbid obesity with BMI of 50.0-59.9, adult (HCC)     Obesity, morbid  (HCC) 08/22/2018    JANICE on CPAP     Pulmonary hypertension (HCC) 08/22/2018    Severe aortic stenosis     Simple goiter     Skin cyst     within the armpits, right    Wears glasses      Family History   Problem Relation Age of Onset    Diabetes Mother     Stroke Mother     Cancer Father     Lung cancer Father     Diabetes Sister     Heart disease Sister     Hypertension Sister     Coronary artery disease Family     Diabetes Family     Hypertension Family     Cancer Family     Stroke Family     Thyroid disease Neg Hx      Past Surgical History:   Procedure Laterality Date    BREAST BIOPSY      CARDIAC CATHETERIZATION      CARPAL TUNNEL RELEASE Bilateral     CHOLECYSTECTOMY      DILATION AND CURETTAGE OF UTERUS      HYSTEROSCOPY      MASTOID SURGERY      IN COLONOSCOPY FLX DX W/COLLJ SPEC WHEN PFRMD N/A 9/6/2018    Procedure: COLONOSCOPY;  Surgeon: Shree Sosa III, MD;  [facility]: [facility];  Service: Gastroenterology    IN ECHO TRANSESOPHAG R-T 2D W/PRB IMG ACQUISJ I&R N/A 10/9/2018    Procedure: INTRA-OP TRANSESOPHAGEAL ECHOCARDIOGRAM (GARRISON);  Surgeon: Kushal Camarena DO;  Location:  MAIN OR;  Service: Cardiac Surgery    IN ESOPHAGOGASTRODUODENOSCOPY TRANSORAL DIAGNOSTIC N/A 8/31/2018    Procedure: ESOPHAGOGASTRODUODENOSCOPY (EGD);  Surgeon: Shree Sosa III, MD;  [facility]: [facility];  Service: Gastroenterology    IN REPLACE AORTIC VALVE OPENFEMORAL ARTERY APPROACH N/A 10/9/2018    Procedure: REPLACEMENT AORTIC VALVE TRANSCATHETER (TAVR) TRANSFEMORAL W/ 23 MM MENDOZA NOE S3 VALVE (ACCESS OF LEFT);  Surgeon: Kushal Camarena DO;  [facility];  Service: Cardiac Surgery    TOTAL HIP ARTHROPLASTY Left 2007    TOTAL KNEE ARTHROPLASTY Bilateral        Current Outpatient Medications:     acetaminophen (TYLENOL) 500 mg tablet, Take 500 mg by mouth every 6 (six) hours as needed, Disp: , Rfl:     aspirin (ECOTRIN LOW STRENGTH) 81 mg EC tablet, Take 1 tablet (81 mg total) by mouth daily, Disp: 100  tablet, Rfl: 0    b complex vitamins capsule, Take 1 capsule by mouth 2 (two) times a day  , Disp: , Rfl:     Calcium Carb-Cholecalciferol (CALCIUM 600 + D PO), Take 1 tablet by mouth 2 (two) times a day, Disp: , Rfl:     Cranberry 250 MG TABS, Take by mouth, Disp: , Rfl:     ferrous sulfate 324 (65 Fe) mg, Take 1 tablet every other day., Disp: 90 tablet, Rfl: 1    furosemide (LASIX) 20 mg tablet, Take 1 tablet (20 mg total) by mouth daily as needed (wt gain), Disp: 30 tablet, Rfl: 0    hydrocortisone 2.5 % cream, Apply topically 2 (two) times a day, Disp: 28 g, Rfl: 1    levothyroxine 50 mcg tablet, Take 1 tablet (50 mcg total) by mouth daily, Disp: 90 tablet, Rfl: 1    ofloxacin (OCUFLOX) 0.3 % ophthalmic solution, 5 drops twice daily to left ear x 10 days., Disp: 10 mL, Rfl: 1    olmesartan (BENICAR) 5 mg tablet, TAKE 2 TABLETS BY MOUTH EVERY DAY, Disp: 180 tablet, Rfl: 3    Omega-3 Fatty Acids (Fish Oil) 300 MG CAPS, Take by mouth, Disp: , Rfl:     omeprazole (PriLOSEC) 40 MG capsule, TAKE 1 CAPSULE BY MOUTH TWICE A DAY, Disp: 180 capsule, Rfl: 1    oxyCODONE (Roxicodone) 5 immediate release tablet, Take 0.5 tablets (2.5 mg total) by mouth 2 (two) times a day as needed for moderate pain Max Daily Amount: 5 mg, Disp: 15 tablet, Rfl: 0    oxygen gas, Inhale 2 L/min continuous. Indications: copd, Disp: , Rfl:     sertraline (ZOLOFT) 100 mg tablet, TAKE 1 TABLET BY MOUTH EVERY DAY, Disp: 90 tablet, Rfl: 1    simvastatin (ZOCOR) 40 mg tablet, TAKE 1 TABLET BY MOUTH EVERYDAY AT BEDTIME, Disp: 90 tablet, Rfl: 1    triamcinolone (KENALOG) 0.1 % cream, Apply topically 2 (two) times a day, Disp: 15 g, Rfl: 1    Turmeric (QC Tumeric Complex) 500 MG CAPS, Take by mouth, Disp: , Rfl:     warfarin (COUMADIN) 5 mg tablet, TAKE 2 TABLETS (10 MG) TODAY AND TOMORROW, Disp: 180 tablet, Rfl: 3    Allergies   Allergen Reactions    Latex Rash    Neosporin [Neomycin-Bacitracin Zn-Polymyx] Rash and Other (See Comments)     hives per  "[facility] order          Lab Review   not applicable     Imaging: No results found.    Objective:     Physical Exam  Constitutional:       Appearance: She is well-developed.   Cardiovascular:      Rate and Rhythm: Normal rate and regular rhythm.      Heart sounds: Murmur heard.   Pulmonary:      Effort: Pulmonary effort is normal. No respiratory distress.      Breath sounds: Normal breath sounds.   Skin:     General: Skin is warm and dry.   Neurological:      Mental Status: She is alert and oriented to person, place, and time.   Psychiatric:         Mood and Affect: Mood normal.           There are no Patient Instructions on file for this visit.    MABEL Hallman    Portions of the record may have been created with voice recognition software.  Occasional wrong word or \"sound a like\" substitutions may have occurred due to the inherent limitations of voice recognition software.  Read the chart carefully and recognize, using context, where substitutions have occurred.  "

## 2024-05-21 NOTE — ASSESSMENT & PLAN NOTE
Patient does not want to be on Vitron-C as it is not covered by insurance.  Requesting prescription iron.  Okay to take every other day.  Will obtain iron panel prior to next visit.

## 2024-05-21 NOTE — TELEPHONE ENCOUNTER
----- Message from MABEL Gunter sent at 5/21/2024  1:46 PM EDT -----  Can we set up mobile labs prior to next appointment?

## 2024-05-21 NOTE — ASSESSMENT & PLAN NOTE
Lab Results   Component Value Date    EGFR 63 04/06/2024    EGFR 65 03/21/2024    EGFR 76 01/30/2024    CREATININE 0.85 04/06/2024    CREATININE 0.82 03/21/2024    CREATININE 0.77 01/30/2024   Advised to avoid NSAIDs.  Will follow-up with blood work in 3 months.

## 2024-05-22 ENCOUNTER — TELEPHONE (OUTPATIENT)
Age: 84
End: 2024-05-22

## 2024-05-22 NOTE — TELEPHONE ENCOUNTER
Patient called and stated that she saw Dr. Schrader yesterday and was prescribed (Iron Vit C) She stated that the pharmacy informed her that her insurance will not cover it because she can get it over the counter. She stated that her friend found over the counter Iron pills for her at [facility] however they don't have Vit C. Patient would like to know if it is ok for her to take even thought the Iron pills she got do not have Vit C in them. Please advise. Patient can be reached at: [phone]

## 2024-05-23 ENCOUNTER — TELEPHONE (OUTPATIENT)
Dept: CARDIOLOGY CLINIC | Facility: CLINIC | Age: 84
End: 2024-05-23

## 2024-05-23 ENCOUNTER — TELEPHONE (OUTPATIENT)
Age: 84
End: 2024-05-23

## 2024-05-23 DIAGNOSIS — Z79.01 LONG TERM (CURRENT) USE OF ANTICOAGULANTS: Primary | ICD-10-CM

## 2024-05-23 NOTE — TELEPHONE ENCOUNTER
Caller: Adrienne ( [facility])      Doctor: Dr. Esteves     Reason for call: Adrienne called to report pt's resting apical heart rate is 53 per minutes and it's regular. It is out of the perimeters of . Adrienne stated if the perimeters need to be changed you can either call or fax instructions.     Call back#: [phone]     Fax # [phone]

## 2024-05-23 NOTE — TELEPHONE ENCOUNTER
I don't know why the lab used his order for her reg blood work when she has a standing order in her chart already?  I placed another standing INR order. If they use ours then you will still get the results. I advised the pt to restart the Coumadin. Dr Esteves would like her to hold 5 days prior to the procedure which I explained to the pt as well.

## 2024-05-23 NOTE — TELEPHONE ENCOUNTER
Caller: Marisol     Doctor: Winston     Reason for call: Patient calling stating she went to ER to check coumaden and patient states nothing was in chart please advise patient if she can move forward with procedure     Call back#: [phone]

## 2024-05-23 NOTE — TELEPHONE ENCOUNTER
Hi,  Requesting a pt/inr home draw to be done around 6/6. Please call the pt to set up. Script is in the chart.     Thank you.    Ani

## 2024-05-23 NOTE — TELEPHONE ENCOUNTER
Lab used Dr Montero's order for her PT/INR for 5/22 as her regularly scheduled lab work for Cardio, therefore there was no order for her last pm.  We had to cx her procedure today after her holding her Coumadin  Any idea's on how to not let this happen?  She is rescheduled for 7/11

## 2024-05-23 NOTE — TELEPHONE ENCOUNTER
Caller: Patient (Marisol)    Doctor: Dr Esteves    Reason for call: Patient was supposed to have injection done at [facility]. She stopped taking Coumadin last Friday and was wondering if she should resume taking it. Her procedure was rescheduled to July 11.      Call back#: [phone]

## 2024-05-23 NOTE — TELEPHONE ENCOUNTER
Adrienne from [facility] calling to relay to Diya Schrader (MABEL):    Resting heart rate: 53 - normal   Increased heartrate during activities (more than previously)  8/10 pain level, consistent for a few days    [facility] patient is supposed to be discharged 5/28, as end of homecare ends 5/28.     Will reach out to cardio relaying this update, too.   Fax # : [phone]    Please advise and return Adrienne's call.   [phone]

## 2024-05-23 NOTE — TELEPHONE ENCOUNTER
S/w pt. Advised to restart her Coumadin at reg dose and retest in 2 weeks. Sent a request to mobile lab to draw the inr at that time. Per 4/24 note, pt is to hold Warfarin 5 days prior to the procedure. Explained last dose is 7/5, resume after the injections and retest in 2 weeks thereafter. She would need to be set up with mobile lab.

## 2024-05-23 NOTE — TELEPHONE ENCOUNTER
Rescheduled pt to next opening -- 7/11/24 -- pt would like to know if she should start her warfarin again now?/would like advisement on hold and bloodwork for rescheduled appt

## 2024-05-23 NOTE — TELEPHONE ENCOUNTER
Caller: Marisol     Doctor: Winston     Reason for call: Patient calling to reschedule procedure please call back to reschedule     Call back#: [phone]

## 2024-06-06 ENCOUNTER — LAB (OUTPATIENT)
Dept: LAB | Facility: HOSPITAL | Age: 84
End: 2024-06-06
Payer: MEDICARE

## 2024-06-06 DIAGNOSIS — Z79.01 LONG TERM (CURRENT) USE OF ANTICOAGULANTS: ICD-10-CM

## 2024-06-06 LAB
INR PPP: 1.75 (ref 0.84–1.19)
PROTHROMBIN TIME: 20.1 SECONDS (ref 11.6–14.5)

## 2024-06-06 PROCEDURE — 85610 PROTHROMBIN TIME: CPT

## 2024-06-06 PROCEDURE — 36415 COLL VENOUS BLD VENIPUNCTURE: CPT

## 2024-06-07 ENCOUNTER — ANTICOAG VISIT (OUTPATIENT)
Dept: CARDIOLOGY CLINIC | Facility: CLINIC | Age: 84
End: 2024-06-07

## 2024-06-07 ENCOUNTER — TELEPHONE (OUTPATIENT)
Dept: CARDIOLOGY CLINIC | Facility: CLINIC | Age: 84
End: 2024-06-07

## 2024-06-07 DIAGNOSIS — Z79.01 LONG TERM (CURRENT) USE OF ANTICOAGULANTS: ICD-10-CM

## 2024-06-07 DIAGNOSIS — I48.0 PAROXYSMAL ATRIAL FIBRILLATION (HCC): Primary | ICD-10-CM

## 2024-06-07 RX ORDER — WARFARIN SODIUM 2.5 MG/1
TABLET ORAL
Qty: 102 TABLET | Refills: 3 | Status: SHIPPED | OUTPATIENT
Start: 2024-06-07

## 2024-06-07 NOTE — RESULT ENCOUNTER NOTE
S/w pt. She did not miss any pills, but states they do not cut evenly. I ordered 2.5mg tablets and advised sd and retest in 1 week. Sent request to mobile lab.

## 2024-06-07 NOTE — TELEPHONE ENCOUNTER
Hi,  Requesting a pt/inr home draw to be done around 6/13/2024. Please call the pt to set up. Script is in the chart.     Thank you.

## 2024-06-13 ENCOUNTER — TELEPHONE (OUTPATIENT)
Dept: CARDIOLOGY CLINIC | Facility: CLINIC | Age: 84
End: 2024-06-13

## 2024-06-13 ENCOUNTER — ANTICOAG VISIT (OUTPATIENT)
Dept: CARDIOLOGY CLINIC | Facility: CLINIC | Age: 84
End: 2024-06-13
Payer: MEDICARE

## 2024-06-13 ENCOUNTER — LAB (OUTPATIENT)
Dept: LAB | Facility: HOSPITAL | Age: 84
End: 2024-06-13
Payer: MEDICARE

## 2024-06-13 DIAGNOSIS — Z00.00 HEALTHCARE MAINTENANCE: ICD-10-CM

## 2024-06-13 DIAGNOSIS — Z79.01 LONG TERM (CURRENT) USE OF ANTICOAGULANTS: Primary | ICD-10-CM

## 2024-06-13 DIAGNOSIS — I48.91 ATRIAL FIBRILLATION, UNSPECIFIED TYPE (HCC): ICD-10-CM

## 2024-06-13 DIAGNOSIS — D50.8 IRON DEFICIENCY ANEMIA SECONDARY TO INADEQUATE DIETARY IRON INTAKE: ICD-10-CM

## 2024-06-13 DIAGNOSIS — Z79.01 LONG TERM (CURRENT) USE OF ANTICOAGULANTS: ICD-10-CM

## 2024-06-13 LAB
ALBUMIN SERPL BCP-MCNC: 3.8 G/DL (ref 3.5–5)
ALP SERPL-CCNC: 57 U/L (ref 34–104)
ALT SERPL W P-5'-P-CCNC: 16 U/L (ref 7–52)
ANION GAP SERPL CALCULATED.3IONS-SCNC: 11 MMOL/L (ref 4–13)
AST SERPL W P-5'-P-CCNC: 22 U/L (ref 13–39)
BASOPHILS # BLD AUTO: 0.04 THOUSANDS/ÂΜL (ref 0–0.1)
BASOPHILS NFR BLD AUTO: 1 % (ref 0–1)
BILIRUB SERPL-MCNC: 0.05 MG/DL (ref 0.2–1)
BUN SERPL-MCNC: 21 MG/DL (ref 5–25)
CALCIUM SERPL-MCNC: 9.9 MG/DL (ref 8.4–10.2)
CHLORIDE SERPL-SCNC: 104 MMOL/L (ref 96–108)
CHOLEST SERPL-MCNC: 126 MG/DL
CO2 SERPL-SCNC: 26 MMOL/L (ref 21–32)
CREAT SERPL-MCNC: 0.75 MG/DL (ref 0.6–1.3)
EOSINOPHIL # BLD AUTO: 0.49 THOUSAND/ÂΜL (ref 0–0.61)
EOSINOPHIL NFR BLD AUTO: 6 % (ref 0–6)
ERYTHROCYTE [DISTWIDTH] IN BLOOD BY AUTOMATED COUNT: 14.6 % (ref 11.6–15.1)
FERRITIN SERPL-MCNC: 39 NG/ML (ref 11–307)
GFR SERPL CREATININE-BSD FRML MDRD: 73 ML/MIN/1.73SQ M
GLUCOSE P FAST SERPL-MCNC: 81 MG/DL (ref 65–99)
HCT VFR BLD AUTO: 38.1 % (ref 34.8–46.1)
HDLC SERPL-MCNC: 65 MG/DL
HGB BLD-MCNC: 11.7 G/DL (ref 11.5–15.4)
IMM GRANULOCYTES # BLD AUTO: 0.03 THOUSAND/UL (ref 0–0.2)
IMM GRANULOCYTES NFR BLD AUTO: 0 % (ref 0–2)
INR PPP: 1.81 (ref 0.84–1.19)
IRON SATN MFR SERPL: 19 % (ref 15–50)
IRON SERPL-MCNC: 59 UG/DL (ref 50–212)
LDLC SERPL CALC-MCNC: 50 MG/DL (ref 0–100)
LYMPHOCYTES # BLD AUTO: 1.45 THOUSANDS/ÂΜL (ref 0.6–4.47)
LYMPHOCYTES NFR BLD AUTO: 18 % (ref 14–44)
MCH RBC QN AUTO: 27.7 PG (ref 26.8–34.3)
MCHC RBC AUTO-ENTMCNC: 30.7 G/DL (ref 31.4–37.4)
MCV RBC AUTO: 90 FL (ref 82–98)
MONOCYTES # BLD AUTO: 0.82 THOUSAND/ÂΜL (ref 0.17–1.22)
MONOCYTES NFR BLD AUTO: 10 % (ref 4–12)
NEUTROPHILS # BLD AUTO: 5.18 THOUSANDS/ÂΜL (ref 1.85–7.62)
NEUTS SEG NFR BLD AUTO: 65 % (ref 43–75)
NONHDLC SERPL-MCNC: 61 MG/DL
NRBC BLD AUTO-RTO: 0 /100 WBCS
PLATELET # BLD AUTO: 320 THOUSANDS/UL (ref 149–390)
PMV BLD AUTO: 9.9 FL (ref 8.9–12.7)
POTASSIUM SERPL-SCNC: 4.5 MMOL/L (ref 3.5–5.3)
PROT SERPL-MCNC: 6.6 G/DL (ref 6.4–8.4)
PROTHROMBIN TIME: 21.8 SECONDS (ref 11.6–14.5)
RBC # BLD AUTO: 4.22 MILLION/UL (ref 3.81–5.12)
SODIUM SERPL-SCNC: 141 MMOL/L (ref 135–147)
TIBC SERPL-MCNC: 307 UG/DL (ref 250–450)
TRIGL SERPL-MCNC: 55 MG/DL
TSH SERPL DL<=0.05 MIU/L-ACNC: 1.25 UIU/ML (ref 0.45–4.5)
UIBC SERPL-MCNC: 248 UG/DL (ref 155–355)
WBC # BLD AUTO: 8.01 THOUSAND/UL (ref 4.31–10.16)

## 2024-06-13 PROCEDURE — 85025 COMPLETE CBC W/AUTO DIFF WBC: CPT

## 2024-06-13 PROCEDURE — 93793 ANTICOAG MGMT PT WARFARIN: CPT

## 2024-06-13 PROCEDURE — 82728 ASSAY OF FERRITIN: CPT

## 2024-06-13 PROCEDURE — 84443 ASSAY THYROID STIM HORMONE: CPT

## 2024-06-13 PROCEDURE — 36415 COLL VENOUS BLD VENIPUNCTURE: CPT

## 2024-06-13 PROCEDURE — 83540 ASSAY OF IRON: CPT

## 2024-06-13 PROCEDURE — 83550 IRON BINDING TEST: CPT

## 2024-06-13 PROCEDURE — 85610 PROTHROMBIN TIME: CPT

## 2024-06-13 PROCEDURE — 80061 LIPID PANEL: CPT

## 2024-06-13 PROCEDURE — 80053 COMPREHEN METABOLIC PANEL: CPT

## 2024-06-13 NOTE — PROGRESS NOTES
Spoke with pt, Advised pt to take 5mg Sun/Tues/Thurs, 2.5mg the rest and retest in 1 week.     Patient is aware of mobile lab set up for home draw.

## 2024-06-13 NOTE — TELEPHONE ENCOUNTER
Hi, Requesting a PT/INR home draw to be done around 6/20/24.. Please call the pt to set up. Script is in the chart.      Thank you.

## 2024-06-13 NOTE — RESULT ENCOUNTER NOTE
Please have her take 5mg Sun/Tues/Thurs, 2.5mg the rest and retest in 1 week. Please notify mobile lab to set up home draw.

## 2024-06-18 ENCOUNTER — OFFICE VISIT (OUTPATIENT)
Dept: FAMILY MEDICINE CLINIC | Facility: CLINIC | Age: 84
End: 2024-06-18
Payer: MEDICARE

## 2024-06-18 VITALS
DIASTOLIC BLOOD PRESSURE: 70 MMHG | HEART RATE: 65 BPM | HEIGHT: 55 IN | BODY MASS INDEX: 42.58 KG/M2 | OXYGEN SATURATION: 93 % | WEIGHT: 184 LBS | SYSTOLIC BLOOD PRESSURE: 112 MMHG

## 2024-06-18 DIAGNOSIS — R30.0 DYSURIA: Primary | ICD-10-CM

## 2024-06-18 LAB
SL AMB  POCT GLUCOSE, UA: ABNORMAL
SL AMB LEUKOCYTE ESTERASE,UA: ABNORMAL
SL AMB POCT BILIRUBIN,UA: ABNORMAL
SL AMB POCT BLOOD,UA: ABNORMAL
SL AMB POCT CLARITY,UA: CLEAR
SL AMB POCT COLOR,UA: ABNORMAL
SL AMB POCT KETONES,UA: ABNORMAL
SL AMB POCT NITRITE,UA: ABNORMAL
SL AMB POCT PH,UA: 6
SL AMB POCT SPECIFIC GRAVITY,UA: 1.02
SL AMB POCT URINE PROTEIN: ABNORMAL
SL AMB POCT UROBILINOGEN: ABNORMAL

## 2024-06-18 PROCEDURE — 99213 OFFICE O/P EST LOW 20 MIN: CPT | Performed by: FAMILY MEDICINE

## 2024-06-18 PROCEDURE — G2211 COMPLEX E/M VISIT ADD ON: HCPCS | Performed by: FAMILY MEDICINE

## 2024-06-18 PROCEDURE — 81002 URINALYSIS NONAUTO W/O SCOPE: CPT | Performed by: FAMILY MEDICINE

## 2024-06-18 PROCEDURE — 87086 URINE CULTURE/COLONY COUNT: CPT | Performed by: FAMILY MEDICINE

## 2024-06-18 RX ORDER — CIPROFLOXACIN 250 MG/1
250 TABLET, FILM COATED ORAL EVERY 12 HOURS SCHEDULED
Qty: 6 TABLET | Refills: 0 | Status: SHIPPED | OUTPATIENT
Start: 2024-06-18 | End: 2024-06-21

## 2024-06-18 NOTE — PROGRESS NOTES
Ambulatory Visit  Name: Marisol Otoole      : 1940      MRN: [id number]  Encounter Provider: MABEL Saab  Encounter Date: 2024   Encounter department: [facility] [address]    Assessment & Plan   1. Dysuria  Comments:  Discussed potentials with patient and family, will urine for culture and sensitivity increase fluids Cipro twice daily call for any changes worsening  Orders:  -     ciprofloxacin (CIPRO) 250 mg tablet; Take 1 tablet (250 mg total) by mouth every 12 (twelve) hours for 3 days  -     POCT urine dip  -     Urine culture       History of Present Illness     Complaining discomfort with urination over the past week  Denies any fever chills, negative back pain abdominal pain  Currently no treatments  Denies any recurrent issues in regard to urinary complaints  Denies associated symptoms such as fever chills nausea vomiting diarrhea constipation    Difficulty Urinating   This is a new problem. The current episode started in the past 7 days. The problem occurs every urination. The quality of the pain is described as burning. There has been no fever. She is Not sexually active. There is No history of pyelonephritis. Pertinent negatives include no chills, discharge, flank pain, frequency, hematuria, hesitancy, nausea, possible pregnancy, sweats, urgency or vomiting. She has tried nothing for the symptoms. Her past medical history is significant for kidney stones. There is no history of recurrent UTIs, a single kidney or urinary stasis.       Review of Systems   Constitutional:  Negative for appetite change, chills, fever and unexpected weight change.   HENT:  Negative for congestion, dental problem, ear pain, hearing loss, postnasal drip, rhinorrhea, sinus pressure, sinus pain, sneezing, sore throat, tinnitus and voice change.    Eyes:  Negative for visual disturbance.   Respiratory:  Negative for apnea, cough, chest tightness and shortness of breath.     Cardiovascular:  Negative for chest pain, palpitations and leg swelling.   Gastrointestinal:  Negative for abdominal pain, blood in stool, constipation, diarrhea, nausea and vomiting.   Endocrine: Negative for cold intolerance, heat intolerance, polydipsia, polyphagia and polyuria.   Genitourinary:  Positive for dysuria. Negative for decreased urine volume, difficulty urinating, flank pain, frequency, hematuria, hesitancy and urgency.   Musculoskeletal:  Negative for arthralgias, back pain, gait problem, joint swelling and myalgias.   Skin:  Negative for color change, rash and wound.   Allergic/Immunologic: Negative for environmental allergies and food allergies.   Neurological:  Negative for dizziness, syncope, weakness, light-headedness, numbness and headaches.   Hematological:  Negative for adenopathy. Does not bruise/bleed easily.   Psychiatric/Behavioral:  Negative for sleep disturbance and suicidal ideas. The patient is not nervous/anxious.      Pertinent Medical History   Complaining of possible urinary tract infections noted approximately  Denies fever chills abdominal pain, negative back pain        Medical History Reviewed by provider this encounter:       Current Outpatient Medications on File Prior to Visit   Medication Sig Dispense Refill    acetaminophen (TYLENOL) 500 mg tablet Take 500 mg by mouth every 6 (six) hours as needed      aspirin (ECOTRIN LOW STRENGTH) 81 mg EC tablet Take 1 tablet (81 mg total) by mouth daily 100 tablet 0    b complex vitamins capsule Take 1 capsule by mouth 2 (two) times a day        Calcium Carb-Cholecalciferol (CALCIUM 600 + D PO) Take 1 tablet by mouth 2 (two) times a day      Cranberry 250 MG TABS Take by mouth      ferrous sulfate 324 (65 Fe) mg Take 1 tablet every other day. 90 tablet 1    furosemide (LASIX) 20 mg tablet Take 1 tablet (20 mg total) by mouth daily as needed (wt gain) 30 tablet 0    hydrocortisone 2.5 % cream Apply topically 2 (two) times a day 28 g  "1    levothyroxine 50 mcg tablet Take 1 tablet (50 mcg total) by mouth daily 90 tablet 1    ofloxacin (OCUFLOX) 0.3 % ophthalmic solution 5 drops twice daily to left ear x 10 days. 10 mL 1    olmesartan (BENICAR) 5 mg tablet TAKE 2 TABLETS BY MOUTH EVERY  tablet 3    Omega-3 Fatty Acids (Fish Oil) 300 MG CAPS Take by mouth      omeprazole (PriLOSEC) 40 MG capsule TAKE 1 CAPSULE BY MOUTH TWICE A  capsule 1    oxyCODONE (Roxicodone) 5 immediate release tablet Take 0.5 tablets (2.5 mg total) by mouth 2 (two) times a day as needed for moderate pain Max Daily Amount: 5 mg 15 tablet 0    oxygen gas Inhale 2 L/min continuous. Indications: copd      sertraline (ZOLOFT) 100 mg tablet TAKE 1 TABLET BY MOUTH EVERY DAY 90 tablet 1    simvastatin (ZOCOR) 40 mg tablet TAKE 1 TABLET BY MOUTH EVERYDAY AT BEDTIME 90 tablet 1    triamcinolone (KENALOG) 0.1 % cream Apply topically 2 (two) times a day 15 g 1    Turmeric (QC Tumeric Complex) 500 MG CAPS Take by mouth      warfarin (Coumadin) 2.5 mg tablet Take 1 tablet (2.5mg) Mon-Sat. Take 2 tablets (5mg) on Sun or as directed 102 tablet 3    [DISCONTINUED] cyclobenzaprine (FLEXERIL) 5 mg tablet Take 1 tablet (5 mg total) by mouth 2 (two) times a day as needed for muscle spasms 30 tablet 0    [DISCONTINUED] meloxicam (Mobic) 15 mg tablet Take 1 tablet (15 mg total) by mouth daily 7 tablet 0     No current facility-administered medications on file prior to visit.      Social History     Tobacco Use    Smoking status: Never    Smokeless tobacco: Never   Vaping Use    Vaping status: Never Used   Substance and Sexual Activity    Alcohol use: No    Drug use: No    Sexual activity: Never     Objective     /70 (BP Location: Left arm, Patient Position: Sitting, Cuff Size: Standard)   Pulse 65   Ht 4' 7\" (1.397 m)   Wt 83.5 kg (184 lb)   SpO2 93%   BMI 42.77 kg/m²     Physical Exam  Vitals and nursing note reviewed.   Constitutional:       General: She is not in acute " distress.     Appearance: She is well-developed. She is obese. She is not ill-appearing or toxic-appearing.   HENT:      Head: Normocephalic and atraumatic.   Eyes:      Conjunctiva/sclera: Conjunctivae normal.   Cardiovascular:      Rate and Rhythm: Normal rate and regular rhythm.      Heart sounds: No murmur heard.  Pulmonary:      Effort: Pulmonary effort is normal. No respiratory distress.      Breath sounds: Normal breath sounds.   Abdominal:      General: Abdomen is protuberant. There is no distension.      Palpations: Abdomen is soft. There is no mass.      Tenderness: There is no abdominal tenderness. There is no right CVA tenderness or left CVA tenderness.   Musculoskeletal:         General: No swelling.      Cervical back: Neck supple.   Skin:     General: Skin is warm and dry.      Capillary Refill: Capillary refill takes less than 2 seconds.   Neurological:      Mental Status: She is alert.   Psychiatric:         Mood and Affect: Mood normal.       Administrative Statements

## 2024-06-19 ENCOUNTER — TELEPHONE (OUTPATIENT)
Age: 84
End: 2024-06-19

## 2024-06-19 LAB — BACTERIA UR CULT: NORMAL

## 2024-06-19 NOTE — TELEPHONE ENCOUNTER
Have not received culture results back as of yet, can fax over urine dip that was completed in office.

## 2024-06-19 NOTE — TELEPHONE ENCOUNTER
Adrienne from [facility] health care     Requesting copy of patients urin culture results done yesterday,please fax to [phone].    Patient in now taking cipro 250 mg & also on warfarin wants to make sure there's no reaction between the two medications      Patient is having level 8 shoulder & hip pain, she's will to try a 10 unit  from a family member first to see if it helps before place on order a order for one her self.

## 2024-06-20 ENCOUNTER — ANTICOAG VISIT (OUTPATIENT)
Dept: CARDIOLOGY CLINIC | Facility: CLINIC | Age: 84
End: 2024-06-20

## 2024-06-20 ENCOUNTER — TELEPHONE (OUTPATIENT)
Dept: CARDIOLOGY CLINIC | Facility: CLINIC | Age: 84
End: 2024-06-20

## 2024-06-20 ENCOUNTER — LAB (OUTPATIENT)
Dept: LAB | Facility: HOSPITAL | Age: 84
End: 2024-06-20
Attending: INTERNAL MEDICINE
Payer: MEDICARE

## 2024-06-20 DIAGNOSIS — Z79.01 LONG TERM (CURRENT) USE OF ANTICOAGULANTS: ICD-10-CM

## 2024-06-20 LAB
INR PPP: 1.93 (ref 0.84–1.19)
PROTHROMBIN TIME: 22.9 SECONDS (ref 11.6–14.5)

## 2024-06-20 PROCEDURE — 85610 PROTHROMBIN TIME: CPT

## 2024-06-20 PROCEDURE — 36415 COLL VENOUS BLD VENIPUNCTURE: CPT

## 2024-06-20 NOTE — TELEPHONE ENCOUNTER
Hi,  Requesting a pt/inr home draw to be done around 7/3. Please call the pt to set up. Script is in the chart.     Thank you.

## 2024-06-25 ENCOUNTER — TELEPHONE (OUTPATIENT)
Age: 84
End: 2024-06-25

## 2024-06-25 NOTE — TELEPHONE ENCOUNTER
Patient called for the results of her urine culture on 6/18.  She said she is still urinating frequently but has no pain or burning when she urinates.  She would like a call back with the results.

## 2024-06-25 NOTE — TELEPHONE ENCOUNTER
Patient returned call. Stated she found he sisters machine it's called Accurate Check Compact Plus One Touch. She is wondering if the test strips can be sent for that? Patient requests call back to advise.

## 2024-06-26 NOTE — TELEPHONE ENCOUNTER
Patient called in again wondering if the test strips will be sent to the pharmacy? Let patient know message was sent to doctor and awaiting response.

## 2024-06-27 ENCOUNTER — TELEPHONE (OUTPATIENT)
Age: 84
End: 2024-06-27

## 2024-06-27 DIAGNOSIS — R35.0 URINARY FREQUENCY: ICD-10-CM

## 2024-06-27 DIAGNOSIS — R35.0 URINARY FREQUENCY: Primary | ICD-10-CM

## 2024-06-27 DIAGNOSIS — R73.03 PREDIABETES: ICD-10-CM

## 2024-06-27 DIAGNOSIS — R73.03 PREDIABETES: Primary | ICD-10-CM

## 2024-06-27 RX ORDER — LANCETS
EACH MISCELLANEOUS
Qty: 100 EACH | Refills: 1 | Status: SHIPPED | OUTPATIENT
Start: 2024-06-27 | End: 2024-06-27

## 2024-06-27 RX ORDER — LANCETS
EACH MISCELLANEOUS
Qty: 100 EACH | Refills: 1 | Status: SHIPPED | OUTPATIENT
Start: 2024-06-27 | End: 2024-06-28 | Stop reason: SDUPTHER

## 2024-06-27 NOTE — TELEPHONE ENCOUNTER
Spoke to miesha and supplies were sent      Patient called in again wondering if the test strips  Accurate Check Compact Plus One Touch. will be sent to the pharmacy? Please advise patient

## 2024-06-27 NOTE — TELEPHONE ENCOUNTER
"Patient called to check the status on Lancets (onetouch Utlrasoft) according to the pharmacist they cannot fill the order without the frequency. She stated that the order just says \" use as directed\" they need the instructions specified with frequency. Please advise. Pharmacy number is [phone]    Please call patient when order placed.   "

## 2024-06-27 NOTE — TELEPHONE ENCOUNTER
Called Adrienne at [facility] and she is aware and will make sure they start the nursing services for patient, she saw Jose on 6/18 for her urine frequency but everything looked normal

## 2024-06-27 NOTE — TELEPHONE ENCOUNTER
Adrienne, PT from [facility] called.   States pt is still expressing concern regarding frequent urination.  Adrienne states she is aware that pt already reached out to provider but wanted to follow up as pt is having trouble controlling continence due to urinary frequency.      Pt is also having a hard time obtaining test strips for the glucometer that she has as the pharmacy states that they need more information.  Pt was asked to check her glucose levels d/t the urinary frequency.     Adrienne also asking if PCP would like to reorder skilled nursing to check on pt in between PT therapy sessions.  If provider is agreeable to this please fax an order to [phone].    Adrienne also states that pt will need a new Gyn as the one she previously saw moved their office to [place] and that's too far for her to drive.  Please review and advise how pt should proceed and call Adrienne with an update.

## 2024-06-28 ENCOUNTER — TELEPHONE (OUTPATIENT)
Age: 84
End: 2024-06-28

## 2024-06-28 DIAGNOSIS — R73.03 PREDIABETES: Primary | ICD-10-CM

## 2024-06-28 DIAGNOSIS — R73.03 PREDIABETES: ICD-10-CM

## 2024-06-28 DIAGNOSIS — R35.0 URINARY FREQUENCY: ICD-10-CM

## 2024-06-28 RX ORDER — LANCETS
EACH MISCELLANEOUS DAILY
Qty: 100 EACH | Refills: 1 | Status: SHIPPED | OUTPATIENT
Start: 2024-06-28

## 2024-06-28 RX ORDER — BLOOD-GLUCOSE METER
KIT MISCELLANEOUS
Qty: 1 KIT | Refills: 0 | Status: SHIPPED | OUTPATIENT
Start: 2024-06-28

## 2024-06-28 RX ORDER — LANCETS 33 GAUGE
EACH MISCELLANEOUS
Qty: 100 EACH | Refills: 3 | Status: SHIPPED | OUTPATIENT
Start: 2024-06-28

## 2024-06-28 RX ORDER — BLOOD SUGAR DIAGNOSTIC
STRIP MISCELLANEOUS
Qty: 100 EACH | Refills: 3 | Status: SHIPPED | OUTPATIENT
Start: 2024-06-28

## 2024-06-28 NOTE — TELEPHONE ENCOUNTER
Called Ludmila and she is aware, she needs Marisol's most recent labs faxed over to her to the below number

## 2024-06-28 NOTE — TELEPHONE ENCOUNTER
Yana can you please resend the script for patient and put check sugar once daily for Diya patient. Thank you

## 2024-06-28 NOTE — TELEPHONE ENCOUNTER
Adrienne from [facility] home health care      Requesting referral for Occupation Therapist     Fax# [phone]

## 2024-06-28 NOTE — TELEPHONE ENCOUNTER
Yana can you please send a ont touch ultra meter for her as well with testing once daily please. Thank you

## 2024-06-28 NOTE — TELEPHONE ENCOUNTER
Home care nurse reports pts Glucometer needs to be ordered and she has no test strips. Medicare approved lancets, tests strips and glucometer.   Nurse needs orders of how often PCP wants pt to test.   Also, Urine lab results?  Please Call Royal @ [phone]  Please fax all of this to [phone].

## 2024-06-28 NOTE — TELEPHONE ENCOUNTER
Patient called regarding the urinary frequency. There is a message in her chart however it says An Error Occurred while processing the e=prescribing message. The message was not sent to the pharmacy.   [facility]

## 2024-06-28 NOTE — TELEPHONE ENCOUNTER
Patient called to refill the test strips for the One Touch meter however they are not listed on the Med List.  [facility]  Please notify patient

## 2024-07-01 ENCOUNTER — OFFICE VISIT (OUTPATIENT)
Dept: FAMILY MEDICINE CLINIC | Facility: CLINIC | Age: 84
End: 2024-07-01
Payer: MEDICARE

## 2024-07-01 VITALS
OXYGEN SATURATION: 96 % | DIASTOLIC BLOOD PRESSURE: 74 MMHG | HEART RATE: 69 BPM | RESPIRATION RATE: 18 BRPM | BODY MASS INDEX: 42.77 KG/M2 | SYSTOLIC BLOOD PRESSURE: 120 MMHG | HEIGHT: 55 IN

## 2024-07-01 DIAGNOSIS — N39.41 URGE INCONTINENCE: Primary | ICD-10-CM

## 2024-07-01 DIAGNOSIS — E66.01 OBESITY, MORBID (HCC): Chronic | ICD-10-CM

## 2024-07-01 DIAGNOSIS — R73.03 PREDIABETES: Primary | ICD-10-CM

## 2024-07-01 DIAGNOSIS — R35.0 URINARY FREQUENCY: ICD-10-CM

## 2024-07-01 LAB
SL AMB  POCT GLUCOSE, UA: NORMAL
SL AMB LEUKOCYTE ESTERASE,UA: NORMAL
SL AMB POCT BILIRUBIN,UA: NORMAL
SL AMB POCT BLOOD,UA: NORMAL
SL AMB POCT CLARITY,UA: CLEAR
SL AMB POCT COLOR,UA: YELLOW
SL AMB POCT KETONES,UA: NORMAL
SL AMB POCT NITRITE,UA: NORMAL
SL AMB POCT PH,UA: 6
SL AMB POCT SPECIFIC GRAVITY,UA: 1.01
SL AMB POCT URINE PROTEIN: NORMAL
SL AMB POCT UROBILINOGEN: NORMAL

## 2024-07-01 PROCEDURE — G2211 COMPLEX E/M VISIT ADD ON: HCPCS | Performed by: NURSE PRACTITIONER

## 2024-07-01 PROCEDURE — 87086 URINE CULTURE/COLONY COUNT: CPT | Performed by: NURSE PRACTITIONER

## 2024-07-01 PROCEDURE — 99214 OFFICE O/P EST MOD 30 MIN: CPT | Performed by: NURSE PRACTITIONER

## 2024-07-01 PROCEDURE — 87186 SC STD MICRODIL/AGAR DIL: CPT | Performed by: NURSE PRACTITIONER

## 2024-07-01 PROCEDURE — 87077 CULTURE AEROBIC IDENTIFY: CPT | Performed by: NURSE PRACTITIONER

## 2024-07-01 PROCEDURE — 81002 URINALYSIS NONAUTO W/O SCOPE: CPT | Performed by: NURSE PRACTITIONER

## 2024-07-01 RX ORDER — OXYBUTYNIN CHLORIDE 5 MG/1
5 TABLET, EXTENDED RELEASE ORAL DAILY
Qty: 90 TABLET | Refills: 1 | Status: SHIPPED | OUTPATIENT
Start: 2024-07-01

## 2024-07-01 NOTE — PROGRESS NOTES
Ambulatory Visit  Name: Marisol Otoole      : 1940      MRN: [id number]  Encounter Provider: MABEL Hallman  Encounter Date: 2024   Encounter department: [facility] [address]    Assessment & Plan   1. Urge incontinence  Comments:  Send culture.  Will start on Ditropan daily.  Advised to let me know if this is not improving and will send to urology.  Orders:  -     oxybutynin (DITROPAN-XL) 5 mg 24 hr tablet; Take 1 tablet (5 mg total) by mouth daily  2. Urinary frequency  Assessment & Plan:  Advised to monitor BS at home. Likely urge incontinence.   Orders:  -     POCT urine dip  -     Urine culture  3. Obesity, morbid (HCC)  Assessment & Plan:  Advised weight loss with diet and exercise.        History of Present Illness     Patient presents with her caregiver for concerns of urinary frequency and urge incontinence. Recently treating with antibiotic for presumed UTI, but urine culture was negative.         Review of Systems   Constitutional:  Negative for chills and fever.   HENT:  Negative for ear pain and sore throat.    Eyes:  Negative for pain and visual disturbance.   Respiratory:  Negative for cough and shortness of breath.    Cardiovascular:  Negative for chest pain and palpitations.   Gastrointestinal:  Negative for abdominal pain and vomiting.   Genitourinary:  Positive for urgency. Negative for dysuria, frequency and hematuria.   Musculoskeletal:  Negative for arthralgias and back pain.   Skin:  Negative for color change and rash.   Neurological:  Negative for seizures and syncope.   All other systems reviewed and are negative.    Current Outpatient Medications on File Prior to Visit   Medication Sig Dispense Refill    acetaminophen (TYLENOL) 500 mg tablet Take 500 mg by mouth every 6 (six) hours as needed      aspirin (ECOTRIN LOW STRENGTH) 81 mg EC tablet Take 1 tablet (81 mg total) by mouth daily 100 tablet 0    b complex vitamins capsule Take 1  capsule by mouth 2 (two) times a day        Blood Glucose Monitoring Suppl (OneTouch Verio Reflect) w/Device KIT Check blood sugars once daily. Please substitute with appropriate alternative as covered by patient's insurance. Dx: E11.65 1 kit 0    Calcium Carb-Cholecalciferol (CALCIUM 600 + D PO) Take 1 tablet by mouth 2 (two) times a day      Cranberry 250 MG TABS Take by mouth      ferrous sulfate 324 (65 Fe) mg Take 1 tablet every other day. 90 tablet 1    furosemide (LASIX) 20 mg tablet Take 1 tablet (20 mg total) by mouth daily as needed (wt gain) 30 tablet 0    glucose blood (OneTouch Verio) test strip Check blood sugars once daily. Please substitute with appropriate alternative as covered by patient's insurance. Dx: E11.65 100 each 3    glucose blood test strip Use 1 each in the morning Use as instructed 100 each 4    hydrocortisone 2.5 % cream Apply topically 2 (two) times a day 28 g 1    Lancets (onetouch ultrasoft) lancets Use in the morning Use as instructed 100 each 1    levothyroxine 50 mcg tablet Take 1 tablet (50 mcg total) by mouth daily 90 tablet 1    ofloxacin (OCUFLOX) 0.3 % ophthalmic solution 5 drops twice daily to left ear x 10 days. 10 mL 1    olmesartan (BENICAR) 5 mg tablet TAKE 2 TABLETS BY MOUTH EVERY  tablet 3    Omega-3 Fatty Acids (Fish Oil) 300 MG CAPS Take by mouth      omeprazole (PriLOSEC) 40 MG capsule TAKE 1 CAPSULE BY MOUTH TWICE A  capsule 1    OneTouch Delica Lancets 33G MISC Check blood sugars once daily. Please substitute with appropriate alternative as covered by patient's insurance. Dx: E11.65 100 each 3    oxyCODONE (Roxicodone) 5 immediate release tablet Take 0.5 tablets (2.5 mg total) by mouth 2 (two) times a day as needed for moderate pain Max Daily Amount: 5 mg 15 tablet 0    oxygen gas Inhale 2 L/min continuous. Indications: copd      sertraline (ZOLOFT) 100 mg tablet TAKE 1 TABLET BY MOUTH EVERY DAY 90 tablet 1    simvastatin (ZOCOR) 40 mg tablet TAKE 1  "TABLET BY MOUTH EVERYDAY AT BEDTIME 90 tablet 1    triamcinolone (KENALOG) 0.1 % cream Apply topically 2 (two) times a day 15 g 1    Turmeric (QC Tumeric Complex) 500 MG CAPS Take by mouth      warfarin (Coumadin) 2.5 mg tablet Take 1 tablet (2.5mg) Mon-Sat. Take 2 tablets (5mg) on Sun or as directed 102 tablet 3    [DISCONTINUED] cyclobenzaprine (FLEXERIL) 5 mg tablet Take 1 tablet (5 mg total) by mouth 2 (two) times a day as needed for muscle spasms 30 tablet 0    [DISCONTINUED] meloxicam (Mobic) 15 mg tablet Take 1 tablet (15 mg total) by mouth daily 7 tablet 0     No current facility-administered medications on file prior to visit.      Objective     /74 (BP Location: Right arm, Patient Position: Sitting)   Pulse 69   Resp 18   Ht 4' 7\" (1.397 m)   SpO2 96%   BMI 42.77 kg/m²     Physical Exam  Vitals and nursing note reviewed.   Constitutional:       General: She is not in acute distress.     Appearance: She is well-developed.   HENT:      Head: Normocephalic and atraumatic.   Eyes:      Conjunctiva/sclera: Conjunctivae normal.   Cardiovascular:      Rate and Rhythm: Normal rate and regular rhythm.      Heart sounds: No murmur heard.  Pulmonary:      Effort: Pulmonary effort is normal. No respiratory distress.      Breath sounds: Normal breath sounds.   Abdominal:      Palpations: Abdomen is soft.      Tenderness: There is no abdominal tenderness.   Musculoskeletal:         General: No swelling.      Cervical back: Neck supple.   Skin:     General: Skin is warm and dry.      Capillary Refill: Capillary refill takes less than 2 seconds.   Neurological:      Mental Status: She is alert.   Psychiatric:         Mood and Affect: Mood normal.       Administrative Statements   I have spent a total time of 20 minutes in caring for this patient on the day of the visit/encounter including Counseling / Coordination of care, Documenting in the medical record, Reviewing / ordering tests, medicine, procedures  , " Obtaining or reviewing history  , and Communicating with other healthcare professionals .

## 2024-07-03 ENCOUNTER — APPOINTMENT (OUTPATIENT)
Dept: LAB | Facility: HOSPITAL | Age: 84
End: 2024-07-03
Payer: MEDICARE

## 2024-07-03 DIAGNOSIS — Z79.01 LONG TERM (CURRENT) USE OF ANTICOAGULANTS: ICD-10-CM

## 2024-07-03 DIAGNOSIS — K21.00 GASTROESOPHAGEAL REFLUX DISEASE WITH ESOPHAGITIS: ICD-10-CM

## 2024-07-03 LAB
BACTERIA UR CULT: ABNORMAL
INR PPP: 2.15 (ref 0.84–1.19)
PROTHROMBIN TIME: 23.6 SECONDS (ref 11.6–14.5)

## 2024-07-03 PROCEDURE — 85610 PROTHROMBIN TIME: CPT

## 2024-07-03 PROCEDURE — 36415 COLL VENOUS BLD VENIPUNCTURE: CPT

## 2024-07-03 RX ORDER — OMEPRAZOLE 40 MG/1
CAPSULE, DELAYED RELEASE ORAL
Qty: 180 CAPSULE | Refills: 1 | Status: SHIPPED | OUTPATIENT
Start: 2024-07-03

## 2024-07-04 DIAGNOSIS — E03.9 ACQUIRED HYPOTHYROIDISM: ICD-10-CM

## 2024-07-04 DIAGNOSIS — E78.5 HYPERLIPIDEMIA, UNSPECIFIED HYPERLIPIDEMIA TYPE: ICD-10-CM

## 2024-07-05 ENCOUNTER — TELEPHONE (OUTPATIENT)
Dept: CARDIOLOGY CLINIC | Facility: CLINIC | Age: 84
End: 2024-07-05

## 2024-07-05 ENCOUNTER — ANTICOAG VISIT (OUTPATIENT)
Dept: CARDIOLOGY CLINIC | Facility: CLINIC | Age: 84
End: 2024-07-05

## 2024-07-05 ENCOUNTER — TELEPHONE (OUTPATIENT)
Age: 84
End: 2024-07-05

## 2024-07-05 ENCOUNTER — TELEPHONE (OUTPATIENT)
Dept: FAMILY MEDICINE CLINIC | Facility: CLINIC | Age: 84
End: 2024-07-05

## 2024-07-05 DIAGNOSIS — N30.00 ACUTE CYSTITIS WITHOUT HEMATURIA: Primary | ICD-10-CM

## 2024-07-05 RX ORDER — SIMVASTATIN 40 MG
TABLET ORAL
Qty: 100 TABLET | Refills: 1 | Status: SHIPPED | OUTPATIENT
Start: 2024-07-05

## 2024-07-05 RX ORDER — AMPICILLIN 500 MG/1
500 CAPSULE ORAL 4 TIMES DAILY
Qty: 28 CAPSULE | Refills: 0 | Status: SHIPPED | OUTPATIENT
Start: 2024-07-05 | End: 2024-07-12

## 2024-07-05 RX ORDER — LEVOTHYROXINE SODIUM 0.05 MG/1
50 TABLET ORAL DAILY
Qty: 100 TABLET | Refills: 1 | Status: SHIPPED | OUTPATIENT
Start: 2024-07-05

## 2024-07-05 NOTE — TELEPHONE ENCOUNTER
07/05 formerly [facility] MOBILE LAB APPT FOR INR ON 07/18
Hi,  Requesting a pt/inr home draw to be done around 7/19. Please call the pt to set up. Script is in the chart.      Thank you.    
Ok thank you   
Your welcome
Detail Level: Detailed
Consent: The patient's consent was obtained including but not limited to risks of crusting, scabbing, blistering, scarring, darker or lighter pigmentary change, recurrence, incomplete removal and infection. The patient understands that the procedure is cosmetic in nature and is not covered by insurance.
Post-Care Instructions: I reviewed with the patient in detail post-care instructions. Patient is to wear sunprotection, and avoid picking at any of the treated lesions. Pt may apply Vaseline to crusted or scabbing areas.
Render Post-Care Instructions In Note?: no

## 2024-07-05 NOTE — PROGRESS NOTES
Called and lvm for pt, Advised pt to stay on same dose and retest in 2 weeks. Mobile lab contacted.

## 2024-07-05 NOTE — TELEPHONE ENCOUNTER
----- Message from MABEL Gunter sent at 7/5/2024 10:19 AM EDT -----  Please let patient know that her urine culture does show UTI.  We will treat with ampicillin.

## 2024-07-05 NOTE — TELEPHONE ENCOUNTER
Caller: Marisol    Doctor/Office: Carlos Marks    Call regarding :  Coumadin clinic     Call was transferred to: [facility]

## 2024-07-05 NOTE — TELEPHONE ENCOUNTER
Pt called regarding call she received from pharmacy about her ampicillin. Pt wanted to make sure that this was sent by PCP. Informed pt that PCP did send ampicillin for her UTI and she should take as directed for 7 days. Pt expressed understanding.     HERACLIO

## 2024-07-08 ENCOUNTER — TELEPHONE (OUTPATIENT)
Dept: OTHER | Facility: OTHER | Age: 84
End: 2024-07-08

## 2024-07-08 DIAGNOSIS — Z79.01 ON CONTINUOUS ORAL ANTICOAGULATION: Primary | ICD-10-CM

## 2024-07-08 NOTE — TELEPHONE ENCOUNTER
Ludmila from [facility] called asking for the urine culture result report to be faxed to her int he morning.  Fax # [phone].   Any questions can be directed to Ludmila at [phone].

## 2024-07-08 NOTE — TELEPHONE ENCOUNTER
Please place PT/INR order for 7/10    S/w pt and reminded her of procedure 7/11 and to have PT/INR drawn 7/10 at a [facility]'s facility.  Pt has begun holding coumadin as ordered.

## 2024-07-09 NOTE — TELEPHONE ENCOUNTER
S/w pt. She will be holding for a back procedure on 7/11. Advised to restart Warfarin that evening as long as it is ok with the surgeon. She is already set up with the mobile lab for 7/18

## 2024-07-10 ENCOUNTER — LAB (OUTPATIENT)
Dept: LAB | Facility: HOSPITAL | Age: 84
End: 2024-07-10
Payer: MEDICARE

## 2024-07-10 DIAGNOSIS — Z79.01 ON CONTINUOUS ORAL ANTICOAGULATION: ICD-10-CM

## 2024-07-10 LAB
INR PPP: 1.2 (ref 0.84–1.19)
PROTHROMBIN TIME: 15.9 SECONDS (ref 11.6–14.5)

## 2024-07-10 PROCEDURE — 36415 COLL VENOUS BLD VENIPUNCTURE: CPT

## 2024-07-10 PROCEDURE — 85610 PROTHROMBIN TIME: CPT

## 2024-07-11 ENCOUNTER — HOSPITAL ENCOUNTER (OUTPATIENT)
Dept: RADIOLOGY | Facility: CLINIC | Age: 84
Discharge: HOME | End: 2024-07-11
Payer: MEDICARE

## 2024-07-11 DIAGNOSIS — M54.16 LUMBAR RADICULOPATHY: ICD-10-CM

## 2024-07-11 DIAGNOSIS — M48.062 SPINAL STENOSIS OF LUMBAR REGION WITH NEUROGENIC CLAUDICATION: ICD-10-CM

## 2024-07-11 RX ORDER — METHYLPREDNISOLONE ACETATE 80 MG/ML
80 INJECTION, SUSPENSION INTRA-ARTICULAR; INTRALESIONAL; INTRAMUSCULAR; PARENTERAL; SOFT TISSUE ONCE
Status: DISCONTINUED | OUTPATIENT
Start: 2024-07-11 | End: 2024-07-11

## 2024-07-11 RX ORDER — BUPIVACAINE HCL/PF 2.5 MG/ML
2 VIAL (ML) INJECTION ONCE
Status: DISCONTINUED | OUTPATIENT
Start: 2024-07-11 | End: 2024-07-11

## 2024-07-11 NOTE — H&P
History of Present Illness: The patient is a 83 y.o. female who presents with complaints of left leg pain    Past Medical History:   Diagnosis Date    Anemia 08/22/2018    Anxiety     Arthritis     AVB (atrioventricular block)     first degree    Cataract     CHF (congestive heart failure) (HCC)     COPD, mild (HCC)     Coronary artery disease     Dislocation of right shoulder joint     Frequent UTI     GERD (gastroesophageal reflux disease)     H/O: pneumonia     Heme positive stool     Hyperlipidemia     Hypertension     Hypothyroidism     Morbid obesity with BMI of 50.0-59.9, adult (HCC)     Obesity, morbid (HCC) 08/22/2018    JANICE on CPAP     Pulmonary hypertension (HCC) 08/22/2018    Severe aortic stenosis     Simple goiter     Skin cyst     within the armpits, right    Wears glasses        Past Surgical History:   Procedure Laterality Date    BREAST BIOPSY      CARDIAC CATHETERIZATION      CARPAL TUNNEL RELEASE Bilateral     CHOLECYSTECTOMY      DILATION AND CURETTAGE OF UTERUS      HYSTEROSCOPY      MASTOID SURGERY      DC COLONOSCOPY FLX DX W/COLLJ SPEC WHEN PFRMD N/A 9/6/2018    Procedure: COLONOSCOPY;  Surgeon: Shree Sosa III, MD;  [facility];  Service: Gastroenterology    DC ECHO TRANSESOPHAG R-T 2D W/PRB IMG ACQUISJ I&R N/A 10/9/2018    Procedure: INTRA-OP TRANSESOPHAGEAL ECHOCARDIOGRAM (GARRISON);  Surgeon: Kushal Camarena DO;  [facility];  Service: Cardiac Surgery    DC ESOPHAGOGASTRODUODENOSCOPY TRANSORAL DIAGNOSTIC N/A 8/31/2018    Procedure: ESOPHAGOGASTRODUODENOSCOPY (EGD);  Surgeon: Shree Sosa III, MD;  [facility];  Service: Gastroenterology    DC REPLACE AORTIC VALVE OPENFEMORAL ARTERY APPROACH N/A 10/9/2018    Procedure: REPLACEMENT AORTIC VALVE TRANSCATHETER (TAVR) TRANSFEMORAL W/ 23 MM MENDOZA NOE S3 VALVE (ACCESS OF LEFT);  Surgeon: Kushal Camarena DO;  [facility];  Service: Cardiac Surgery    TOTAL HIP ARTHROPLASTY Left 2007    TOTAL KNEE  ARTHROPLASTY Bilateral          Current Outpatient Medications:     acetaminophen (TYLENOL) 500 mg tablet, Take 500 mg by mouth every 6 (six) hours as needed, Disp: , Rfl:     ampicillin (PRINCIPEN) 500 mg capsule, Take 1 capsule (500 mg total) by mouth 4 (four) times a day for 7 days, Disp: 28 capsule, Rfl: 0    aspirin (ECOTRIN LOW STRENGTH) 81 mg EC tablet, Take 1 tablet (81 mg total) by mouth daily, Disp: 100 tablet, Rfl: 0    b complex vitamins capsule, Take 1 capsule by mouth 2 (two) times a day  , Disp: , Rfl:     Blood Glucose Monitoring Suppl (OneTouch Verio Reflect) w/Device KIT, Check blood sugars once daily. Please substitute with appropriate alternative as covered by patient's insurance. Dx: E11.65, Disp: 1 kit, Rfl: 0    Calcium Carb-Cholecalciferol (CALCIUM 600 + D PO), Take 1 tablet by mouth 2 (two) times a day, Disp: , Rfl:     Cranberry 250 MG TABS, Take by mouth, Disp: , Rfl:     ferrous sulfate 324 (65 Fe) mg, Take 1 tablet every other day., Disp: 90 tablet, Rfl: 1    furosemide (LASIX) 20 mg tablet, Take 1 tablet (20 mg total) by mouth daily as needed (wt gain), Disp: 30 tablet, Rfl: 0    glucose blood (OneTouch Verio) test strip, Check blood sugars once daily. Please substitute with appropriate alternative as covered by patient's insurance. Dx: E11.65, Disp: 100 each, Rfl: 3    glucose blood test strip, Use 1 each in the morning Use as instructed, Disp: 100 each, Rfl: 4    hydrocortisone 2.5 % cream, Apply topically 2 (two) times a day, Disp: 28 g, Rfl: 1    Lancets (onetouch ultrasoft) lancets, Use in the morning Use as instructed, Disp: 100 each, Rfl: 1    levothyroxine 50 mcg tablet, TAKE 1 TABLET BY MOUTH EVERY DAY, Disp: 100 tablet, Rfl: 1    ofloxacin (OCUFLOX) 0.3 % ophthalmic solution, 5 drops twice daily to left ear x 10 days., Disp: 10 mL, Rfl: 1    olmesartan (BENICAR) 5 mg tablet, TAKE 2 TABLETS BY MOUTH EVERY DAY, Disp: 180 tablet, Rfl: 3    Omega-3 Fatty Acids (Fish Oil) 300 MG  CAPS, Take by mouth, Disp: , Rfl:     omeprazole (PriLOSEC) 40 MG capsule, TAKE 1 CAPSULE BY MOUTH TWICE A DAY, Disp: 180 capsule, Rfl: 1    OneTouch Delica Lancets 33G MISC, Check blood sugars once daily. Please substitute with appropriate alternative as covered by patient's insurance. Dx: E11.65, Disp: 100 each, Rfl: 3    oxybutynin (DITROPAN-XL) 5 mg 24 hr tablet, Take 1 tablet (5 mg total) by mouth daily, Disp: 90 tablet, Rfl: 1    oxyCODONE (Roxicodone) 5 immediate release tablet, Take 0.5 tablets (2.5 mg total) by mouth 2 (two) times a day as needed for moderate pain Max Daily Amount: 5 mg, Disp: 15 tablet, Rfl: 0    oxygen gas, Inhale 2 L/min continuous. Indications: copd, Disp: , Rfl:     sertraline (ZOLOFT) 100 mg tablet, TAKE 1 TABLET BY MOUTH EVERY DAY, Disp: 90 tablet, Rfl: 1    simvastatin (ZOCOR) 40 mg tablet, TAKE 1 TABLET BY MOUTH EVERYDAY AT BEDTIME, Disp: 100 tablet, Rfl: 1    triamcinolone (KENALOG) 0.1 % cream, Apply topically 2 (two) times a day, Disp: 15 g, Rfl: 1    Turmeric (QC Tumeric Complex) 500 MG CAPS, Take by mouth, Disp: , Rfl:     warfarin (Coumadin) 2.5 mg tablet, Take 1 tablet (2.5mg) Mon-Sat. Take 2 tablets (5mg) on Sun or as directed, Disp: 102 tablet, Rfl: 3    Allergies   Allergen Reactions    Latex Rash    Neosporin [Neomycin-Bacitracin Zn-Polymyx] Rash and Other (See Comments)     hives per [facility] order       Physical Exam: There were no vitals filed for this visit.  General: Awake, Alert, Oriented x 3, Mood and affect appropriate  Respiratory: Respirations even and unlabored  Cardiovascular: Peripheral pulses intact; no edema  Musculoskeletal Exam: back non erythematous no lesions    ASA Score: 3         Assessment:   1. Spinal stenosis of lumbar region with neurogenic claudication    2. Lumbar radiculopathy        Plan: Left L4 and L5 TFESI depo

## 2024-07-11 NOTE — INTERVAL H&P NOTE
Update: (This section must be completed if the H&P was completed greater than 24 hrs to procedure or admission)    INJECTION NOT PERFORMED- UTI ON ABX WITH ONGOING FREQUENCY SYMPTOMS. WILL ALSO HOLD OFF ON LUMBAR INJECTION TREATMENTS IN GENERAL DUE TO DIFFICULTY LAYING ON HER STOMACH AS WELL.     Patient re-evaluated. Accept as history and physical.    Juan Montero MD/July 11, 2024/10:54 AM

## 2024-07-15 NOTE — PROGRESS NOTES
Pain Medicine Follow-Up Note    Assessment:  1. Spinal stenosis of lumbar region with neurogenic claudication    2. DDD (degenerative disc disease), lumbar    3. Neuropathy    4. Lumbar radiculopathy        Plan:    New Medications Ordered This Visit   Medications    pregabalin (LYRICA) 25 mg capsule     Sig: Take 1 capsule (25 mg total) by mouth daily at bedtime for 3 days, THEN 1 capsule (25 mg total) 2 (two) times a day for 27 days.     Dispense:  57 capsule     Refill:  0       My impressions and treatment recommendations were discussed in detail with the patient who verbalized understanding and had no further questions.      The patient returns to the office today with ongoing severe pain in her bilateral low back and left lower leg.  At this time we recommend the patient no longer pursue injections and to trial medication management, due to the patient having significant difficulty getting on the procedure table and being able to lay on her belly.  The patient has trialed tramadol in the past which caused her to feel funny and recently she has trialed oxycodone, Which she says helps with the pain but she does not like how she feels on that medication as well.  Discussed with the patient that we could trial buprenorphine, however the patient reports that she does not have a good prescription plan.  Also discussed low-dose naltrexone but this may be expensive as well.    I recommend the patient trial pregabalin 25 mg tablet patient may take 1 tablet at bedtime for 3 days then increase it to twice a day dosing.  Patient educated on pregabalin understands that this medication needs to be taken consistently in order for it to provide her pain benefit.  The patient verbalized understanding.    [place] Prescription Drug Monitoring Program report was reviewed and was appropriate     Follow-up is planned in 4 weeks time or sooner as warranted.  Discharge instructions were provided. I personally saw and examined  the patient and I agree with the above discussed plan of care.    History of Present Illness:    Marisol Otoole is a 83 y.o. female who presents to [facility] for interval re-evaluation of the above stated pain complaints. The patient has a past medical and chronic pain history as outlined in the assessment section. She was last seen on 4/22/2024.    At today's visit patient states that their pain symptoms are the same with a pain score of 8/10 on the verbal numeric pain scale.  The patient's pain is worse in the morning, evening, and at night.  The patient's pain is constant in nature.  And the quality of the patient's pain is described as throbbing.  The patient's pain is located in the bilateral low back left greater than the right and left lower leg.  Patient is currently prescribed oxycodone which she states does provide her pain benefit however she does not like the way it makes her feel therefore she does not use it.    Other than as stated above, the patient denies any interval changes in medications, medical condition, mental condition, symptoms, or allergies since the last office visit.         Review of Systems:    Review of Systems   Respiratory:  Positive for shortness of breath.    Cardiovascular:  Positive for leg swelling. Negative for chest pain.   Gastrointestinal:  Negative for constipation, diarrhea, nausea and vomiting.   Musculoskeletal:  Positive for arthralgias, gait problem and myalgias. Negative for joint swelling.        DROM  Pain in Extremity ankle, shoulder    Skin:  Negative for rash.   Neurological:  Negative for dizziness, seizures and weakness.   All other systems reviewed and are negative.        Past Medical History:   Diagnosis Date    Anemia 08/22/2018    Anxiety     Arthritis     AVB (atrioventricular block)     first degree    Cataract     CHF (congestive heart failure) (HCC)     COPD, mild (HCC)     Coronary artery disease     Dislocation of right  shoulder joint     Frequent UTI     GERD (gastroesophageal reflux disease)     H/O: pneumonia     Heme positive stool     Hyperlipidemia     Hypertension     Hypothyroidism     Morbid obesity with BMI of 50.0-59.9, adult (HCC)     Obesity, morbid (HCC) 08/22/2018    JANICE on CPAP     Pulmonary hypertension (HCC) 08/22/2018    Severe aortic stenosis     Simple goiter     Skin cyst     within the armpits, right    Wears glasses        Past Surgical History:   Procedure Laterality Date    BREAST BIOPSY      CARDIAC CATHETERIZATION      CARPAL TUNNEL RELEASE Bilateral     CHOLECYSTECTOMY      DILATION AND CURETTAGE OF UTERUS      HYSTEROSCOPY      MASTOID SURGERY      GA COLONOSCOPY FLX DX W/COLLJ SPEC WHEN PFRMD N/A 9/6/2018    Procedure: COLONOSCOPY;  Surgeon: Shree Sosa III, MD;  [facility];  Service: Gastroenterology    GA ECHO TRANSESOPHAG R-T 2D W/PRB IMG ACQUISJ I&R N/A 10/9/2018    Procedure: INTRA-OP TRANSESOPHAGEAL ECHOCARDIOGRAM (GARRISON);  Surgeon: Kushal Camarena DO;  [facility];  Service: Cardiac Surgery    GA ESOPHAGOGASTRODUODENOSCOPY TRANSORAL DIAGNOSTIC N/A 8/31/2018    Procedure: ESOPHAGOGASTRODUODENOSCOPY (EGD);  Surgeon: Shree Sosa III, MD;  [facility];  Service: Gastroenterology    GA REPLACE AORTIC VALVE OPENFEMORAL ARTERY APPROACH N/A 10/9/2018    Procedure: REPLACEMENT AORTIC VALVE TRANSCATHETER (TAVR) TRANSFEMORAL W/ 23 MM MENDOZA NOE S3 VALVE (ACCESS OF LEFT);  Surgeon: Kushal Camarena DO;  [facility];  Service: Cardiac Surgery    TOTAL HIP ARTHROPLASTY Left 2007    TOTAL KNEE ARTHROPLASTY Bilateral        Family History   Problem Relation Age of Onset    Diabetes Mother     Stroke Mother     Cancer Father     Lung cancer Father     Diabetes Sister     Heart disease Sister     Hypertension Sister     Coronary artery disease Family     Diabetes Family     Hypertension Family     Cancer Family     Stroke Family     Thyroid disease Neg Hx         Social History     Occupational History    Occupation: retired   Tobacco Use    Smoking status: Never    Smokeless tobacco: Never   Vaping Use    Vaping status: Never Used   Substance and Sexual Activity    Alcohol use: No    Drug use: No    Sexual activity: Never         Current Outpatient Medications:     acetaminophen (TYLENOL) 500 mg tablet, Take 500 mg by mouth every 6 (six) hours as needed, Disp: , Rfl:     aspirin (ECOTRIN LOW STRENGTH) 81 mg EC tablet, Take 1 tablet (81 mg total) by mouth daily, Disp: 100 tablet, Rfl: 0    b complex vitamins capsule, Take 1 capsule by mouth 2 (two) times a day  , Disp: , Rfl:     Blood Glucose Monitoring Suppl (OneTouch Verio Reflect) w/Device KIT, Check blood sugars once daily. Please substitute with appropriate alternative as covered by patient's insurance. Dx: E11.65, Disp: 1 kit, Rfl: 0    Calcium Carb-Cholecalciferol (CALCIUM 600 + D PO), Take 1 tablet by mouth 2 (two) times a day, Disp: , Rfl:     Cranberry 250 MG TABS, Take by mouth, Disp: , Rfl:     ferrous sulfate 324 (65 Fe) mg, Take 1 tablet every other day., Disp: 90 tablet, Rfl: 1    furosemide (LASIX) 20 mg tablet, Take 1 tablet (20 mg total) by mouth daily as needed (wt gain), Disp: 30 tablet, Rfl: 0    glucose blood (OneTouch Verio) test strip, Check blood sugars once daily. Please substitute with appropriate alternative as covered by patient's insurance. Dx: E11.65, Disp: 100 each, Rfl: 3    glucose blood test strip, Use 1 each in the morning Use as instructed, Disp: 100 each, Rfl: 4    hydrocortisone 2.5 % cream, Apply topically 2 (two) times a day, Disp: 28 g, Rfl: 1    Lancets (onetouch ultrasoft) lancets, Use in the morning Use as instructed, Disp: 100 each, Rfl: 1    levothyroxine 50 mcg tablet, TAKE 1 TABLET BY MOUTH EVERY DAY, Disp: 100 tablet, Rfl: 1    olmesartan (BENICAR) 5 mg tablet, TAKE 2 TABLETS BY MOUTH EVERY DAY, Disp: 180 tablet, Rfl: 3    omeprazole (PriLOSEC) 40 MG capsule, TAKE 1  "CAPSULE BY MOUTH TWICE A DAY, Disp: 180 capsule, Rfl: 1    OneTouch Delica Lancets 33G MISC, Check blood sugars once daily. Please substitute with appropriate alternative as covered by patient's insurance. Dx: E11.65, Disp: 100 each, Rfl: 3    oxybutynin (DITROPAN-XL) 5 mg 24 hr tablet, Take 1 tablet (5 mg total) by mouth daily, Disp: 90 tablet, Rfl: 1    oxygen gas, Inhale 2 L/min continuous. Indications: copd, Disp: , Rfl:     pregabalin (LYRICA) 25 mg capsule, Take 1 capsule (25 mg total) by mouth daily at bedtime for 3 days, THEN 1 capsule (25 mg total) 2 (two) times a day for 27 days., Disp: 57 capsule, Rfl: 0    sertraline (ZOLOFT) 100 mg tablet, TAKE 1 TABLET BY MOUTH EVERY DAY, Disp: 90 tablet, Rfl: 1    simvastatin (ZOCOR) 40 mg tablet, TAKE 1 TABLET BY MOUTH EVERYDAY AT BEDTIME, Disp: 100 tablet, Rfl: 1    triamcinolone (KENALOG) 0.1 % cream, Apply topically 2 (two) times a day, Disp: 15 g, Rfl: 1    Turmeric (QC Tumeric Complex) 500 MG CAPS, Take by mouth, Disp: , Rfl:     warfarin (Coumadin) 2.5 mg tablet, Take 1 tablet (2.5mg) Mon-Sat. Take 2 tablets (5mg) on Sun or as directed, Disp: 102 tablet, Rfl: 3    ofloxacin (OCUFLOX) 0.3 % ophthalmic solution, 5 drops twice daily to left ear x 10 days. (Patient not taking: Reported on 7/16/2024), Disp: 10 mL, Rfl: 1    Omega-3 Fatty Acids (Fish Oil) 300 MG CAPS, Take by mouth, Disp: , Rfl:     oxyCODONE (Roxicodone) 5 immediate release tablet, Take 0.5 tablets (2.5 mg total) by mouth 2 (two) times a day as needed for moderate pain Max Daily Amount: 5 mg (Patient not taking: Reported on 7/16/2024), Disp: 15 tablet, Rfl: 0    Allergies   Allergen Reactions    Latex Rash    Neosporin [Neomycin-Bacitracin Zn-Polymyx] Rash and Other (See Comments)     hives per [facility] order       Physical Exam:    /56   Pulse (!) 51   Ht 4' 7\" (1.397 m)   BMI 42.77 kg/m²     Constitutional:normal, well developed, well nourished, alert, in no distress and non-toxic and no " overt pain behavior. and obese  Eyes:anicteric  HEENT:grossly intact  Neck:supple, symmetric, trachea midline and no masses   Pulmonary:even and unlabored  Cardiovascular: Bilateral lower leg edema  Skin:Normal without rashes or lesions and well hydrated  Psychiatric:Mood and affect appropriate  Neurologic:Cranial Nerves II-XII grossly intact  Musculoskeletal:antalgic, shuffling, stooped posture, and   ambulates with a rolling walker    This document was created using speech voice recognition software.   Grammatical errors, random word insertions, pronoun errors, and incomplete sentences are an occasional consequence of this system due to software limitations, ambient noise, and hardware issues.   Any formal questions or concerns about content, text, or information contained within the body of this dictation should be directly addressed to the provider for clarification.

## 2024-07-16 ENCOUNTER — TELEPHONE (OUTPATIENT)
Age: 84
End: 2024-07-16

## 2024-07-16 ENCOUNTER — OFFICE VISIT (OUTPATIENT)
Dept: PAIN MEDICINE | Facility: CLINIC | Age: 84
End: 2024-07-16

## 2024-07-16 VITALS
HEIGHT: 55 IN | DIASTOLIC BLOOD PRESSURE: 56 MMHG | SYSTOLIC BLOOD PRESSURE: 106 MMHG | BODY MASS INDEX: 42.77 KG/M2 | HEART RATE: 51 BPM

## 2024-07-16 DIAGNOSIS — M54.16 LUMBAR RADICULOPATHY: ICD-10-CM

## 2024-07-16 DIAGNOSIS — M51.36 DDD (DEGENERATIVE DISC DISEASE), LUMBAR: ICD-10-CM

## 2024-07-16 DIAGNOSIS — G62.9 NEUROPATHY: ICD-10-CM

## 2024-07-16 DIAGNOSIS — M48.062 SPINAL STENOSIS OF LUMBAR REGION WITH NEUROGENIC CLAUDICATION: Primary | ICD-10-CM

## 2024-07-16 RX ORDER — PREGABALIN 25 MG/1
CAPSULE ORAL
Qty: 57 CAPSULE | Refills: 0 | Status: SHIPPED | OUTPATIENT
Start: 2024-07-16 | End: 2024-08-15

## 2024-07-16 NOTE — TELEPHONE ENCOUNTER
PA for PREGABALIN Approved     Date(s) approved UNTIL 07/16/2025           Pharmacy advised by    [x]Fax  []Phone call    Approval letter scanned into [employer] Yes

## 2024-07-16 NOTE — TELEPHONE ENCOUNTER
Caller: christal Damon    Doctor: Winston    Reason for call: pt called stating she needs a PA for the pregabalin    Call back#: [phone]

## 2024-07-16 NOTE — TELEPHONE ENCOUNTER
PA for PREGABALIN    Submitted via    []CMM-KEY   [x]Surescripts-Case ID # [id number]   []Faxed to plan   []Other website   []Phone call Case ID #     Office notes sent, clinical questions answered. Awaiting determination    Turnaround time for your insurance to make a decision on your Prior Authorization can take 7-21 business days.

## 2024-07-17 NOTE — TELEPHONE ENCOUNTER
Caller: Marisol     Doctor: Winston     Reason for call: Patient calling stating per pharmacy prior auth needed for medication I did advise patient of message below please advise     Call back#: [phone]

## 2024-07-18 ENCOUNTER — APPOINTMENT (OUTPATIENT)
Dept: LAB | Facility: HOSPITAL | Age: 84
End: 2024-07-18
Attending: INTERNAL MEDICINE
Payer: MEDICARE

## 2024-07-18 ENCOUNTER — TELEPHONE (OUTPATIENT)
Dept: FAMILY MEDICINE CLINIC | Facility: CLINIC | Age: 84
End: 2024-07-18

## 2024-07-18 ENCOUNTER — TELEPHONE (OUTPATIENT)
Age: 84
End: 2024-07-18

## 2024-07-18 ENCOUNTER — CLINICAL SUPPORT (OUTPATIENT)
Dept: FAMILY MEDICINE CLINIC | Facility: CLINIC | Age: 84
End: 2024-07-18
Payer: MEDICARE

## 2024-07-18 DIAGNOSIS — Z79.01 LONG TERM (CURRENT) USE OF ANTICOAGULANTS: ICD-10-CM

## 2024-07-18 DIAGNOSIS — R30.0 DYSURIA: Primary | ICD-10-CM

## 2024-07-18 LAB
INR PPP: 1.76 (ref 0.84–1.19)
PROTHROMBIN TIME: 20.2 SECONDS (ref 11.6–14.5)
SL AMB  POCT GLUCOSE, UA: NORMAL
SL AMB LEUKOCYTE ESTERASE,UA: NORMAL
SL AMB POCT BILIRUBIN,UA: NORMAL
SL AMB POCT BLOOD,UA: NORMAL
SL AMB POCT CLARITY,UA: NORMAL
SL AMB POCT COLOR,UA: YELLOW
SL AMB POCT KETONES,UA: NORMAL
SL AMB POCT NITRITE,UA: NORMAL
SL AMB POCT PH,UA: 6
SL AMB POCT SPECIFIC GRAVITY,UA: 1.01
SL AMB POCT URINE PROTEIN: NORMAL
SL AMB POCT UROBILINOGEN: NORMAL

## 2024-07-18 PROCEDURE — 87186 SC STD MICRODIL/AGAR DIL: CPT | Performed by: NURSE PRACTITIONER

## 2024-07-18 PROCEDURE — 87077 CULTURE AEROBIC IDENTIFY: CPT | Performed by: NURSE PRACTITIONER

## 2024-07-18 PROCEDURE — 87086 URINE CULTURE/COLONY COUNT: CPT | Performed by: NURSE PRACTITIONER

## 2024-07-18 PROCEDURE — 81002 URINALYSIS NONAUTO W/O SCOPE: CPT

## 2024-07-18 PROCEDURE — 85610 PROTHROMBIN TIME: CPT

## 2024-07-18 PROCEDURE — 36415 COLL VENOUS BLD VENIPUNCTURE: CPT

## 2024-07-18 NOTE — TELEPHONE ENCOUNTER
[facility] wanted to update provider that they will discontinue physical therapy , patient wants to concentrate on occupation therapy.

## 2024-07-18 NOTE — TELEPHONE ENCOUNTER
She is still having the frequent urination.  She goes and then a little bit later she has to go again and she can't hold it. She said she is taking pills that she was given for it and their not working.    Please advise

## 2024-07-18 NOTE — TELEPHONE ENCOUNTER
Oly called from [facility].  They received a total of 4 orders faxed to them today but the providers signature is needed on them.  Please have PCP sign and fax back to [phone].

## 2024-07-19 ENCOUNTER — TELEPHONE (OUTPATIENT)
Dept: CARDIOLOGY CLINIC | Facility: CLINIC | Age: 84
End: 2024-07-19

## 2024-07-19 ENCOUNTER — ANTICOAG VISIT (OUTPATIENT)
Dept: CARDIOLOGY CLINIC | Facility: CLINIC | Age: 84
End: 2024-07-19

## 2024-07-19 ENCOUNTER — TELEPHONE (OUTPATIENT)
Dept: LAB | Facility: HOSPITAL | Age: 84
End: 2024-07-19

## 2024-07-19 ENCOUNTER — TELEPHONE (OUTPATIENT)
Age: 84
End: 2024-07-19

## 2024-07-19 NOTE — TELEPHONE ENCOUNTER
Please obtain INR for this pt on 7/24   Transposition Flap Text: The defect edges were debeveled with a #15 scalpel blade. Given the location of the defect and the proximity to free margins a transposition flap was deemed most appropriate. Using a sterile surgical marker, an appropriate transposition flap was drawn incorporating the defect. The area thus outlined was incised deep to adipose tissue with a #15 scalpel blade. The skin margins were undermined to an appropriate distance in all directions utilizing iris scissors. Following this, the designed flap was carried over into the primary defect and sutured into place.

## 2024-07-19 NOTE — TELEPHONE ENCOUNTER
7/19 -  for pt @ 123 PM; attempted to call both numbers. Pt answered, then when I called back did not

## 2024-07-19 NOTE — TELEPHONE ENCOUNTER
Patient called to let Diya know that she is still urinating a lot with discomfort.  She is asking if Diya read the results yet of the Urine culture.  She is wondering if she needs to see a specialist. She is asking that she get a call back letting her know the results.  Her new pharmacy is [facility].

## 2024-07-19 NOTE — TELEPHONE ENCOUNTER
Call placed to Marisol no answer, please advise Diya is awaiting on the sensitivity for rule out which antibiotic will help her.

## 2024-07-19 NOTE — TELEPHONE ENCOUNTER
Pt has been notified and expressed understanding. Pt will  OTC AZO for symptom relief until sensitivities are back.

## 2024-07-20 LAB — BACTERIA UR CULT: ABNORMAL

## 2024-07-22 DIAGNOSIS — N30.00 ACUTE CYSTITIS WITHOUT HEMATURIA: Primary | ICD-10-CM

## 2024-07-22 RX ORDER — NITROFURANTOIN 25; 75 MG/1; MG/1
100 CAPSULE ORAL 2 TIMES DAILY
Qty: 10 CAPSULE | Refills: 0 | Status: SHIPPED | OUTPATIENT
Start: 2024-07-22 | End: 2024-07-27

## 2024-07-23 ENCOUNTER — NURSE TRIAGE (OUTPATIENT)
Age: 84
End: 2024-07-23

## 2024-07-23 NOTE — TELEPHONE ENCOUNTER
"Cristin called from [facility] to make Dr. Esteves aware pt's bp is low today. She is asymptomatic, she may also be dehydrated, she has only had 8 oz of water so far today, she is going to encourage hydration. BP RUE 90/52, LUE 88/50. Pt taking Olmesartin 5mg 2 tabs once daily. She is scheduled for f/u visit with Dr Esteves 9/9/24.     Answer Assessment - Initial Assessment Questions  1. BLOOD PRESSURE: \"What is the blood pressure?\" \"Did you take at least two measurements 5 minutes apart?\"      Twice in both arms, results were 90/52 RUE, LUE 88/50  2. ONSET: \"When did you take your blood pressure?\"      10\" ago  3. HOW: \"How did you obtain the blood pressure?\" (e.g., visiting nurse, automatic home BP monitor)      Visiting nurse, regular adult size  4. HISTORY: \"Do you have a history of low blood pressure?\" \"What is your blood pressure normally?\"      Normally 120's/80's, checked weekly during nursing visits  5. MEDICATIONS: \"Are you taking any medications for blood pressure?\" If yes: \"Have they been changed recently?\"      No change, Olmesartin 5mg 2 tabs once daily.  Pt started taking lyrica yesterday which she feels may be cause   6. PULSE RATE: \"Do you know what your pulse rate is?\"       60 apical  7. OTHER SYMPTOMS: \"Have you been sick recently?\" \"Have you had a recent injury?\"      Uti, taking antibiotic, started yesterday  8. PREGNANCY: \"Is there any chance you are pregnant?\" \"When was your last menstrual period?\"      N/a    Protocols used: Low Blood Pressure-ADULT-[place]    "

## 2024-07-24 ENCOUNTER — LAB (OUTPATIENT)
Dept: LAB | Facility: HOSPITAL | Age: 84
End: 2024-07-24
Attending: INTERNAL MEDICINE
Payer: MEDICARE

## 2024-07-24 ENCOUNTER — ANTICOAG VISIT (OUTPATIENT)
Dept: CARDIOLOGY CLINIC | Facility: CLINIC | Age: 84
End: 2024-07-24

## 2024-07-24 ENCOUNTER — TELEPHONE (OUTPATIENT)
Dept: CARDIOLOGY CLINIC | Facility: CLINIC | Age: 84
End: 2024-07-24

## 2024-07-24 DIAGNOSIS — Z79.01 LONG TERM (CURRENT) USE OF ANTICOAGULANTS: ICD-10-CM

## 2024-07-24 LAB
INR PPP: 3.85 (ref 0.84–1.19)
PROTHROMBIN TIME: 38.9 SECONDS (ref 11.6–14.5)

## 2024-07-24 PROCEDURE — 36415 COLL VENOUS BLD VENIPUNCTURE: CPT

## 2024-07-24 PROCEDURE — 85610 PROTHROMBIN TIME: CPT

## 2024-07-24 NOTE — PROGRESS NOTES
Lmom to hold today then take 2.5mg Sun/Thurs, 5mg the rest and retest in 1 week. Sent request to mobile lab.

## 2024-07-24 NOTE — TELEPHONE ENCOUNTER
Esteban relaying Dr. Esteves's response.  ESTEBAN for Cristin from [facility]

## 2024-07-24 NOTE — TELEPHONE ENCOUNTER
Hi,  Requesting a pt/inr home draw to be done around 7/31. Please call the pt to set up. Script is in the chart.     Thank you.

## 2024-07-29 ENCOUNTER — NURSE TRIAGE (OUTPATIENT)
Age: 84
End: 2024-07-29

## 2024-07-29 ENCOUNTER — APPOINTMENT (EMERGENCY)
Dept: RADIOLOGY | Facility: HOSPITAL | Age: 84
DRG: 291 | End: 2024-07-29
Payer: MEDICARE

## 2024-07-29 ENCOUNTER — HOSPITAL ENCOUNTER (INPATIENT)
Facility: HOSPITAL | Age: 84
LOS: 3 days | Discharge: HOME HEALTH | DRG: 291 | End: 2024-08-01
Attending: EMERGENCY MEDICINE | Admitting: INTERNAL MEDICINE
Payer: MEDICARE

## 2024-07-29 DIAGNOSIS — R53.1 GENERALIZED WEAKNESS: ICD-10-CM
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
## 2024-07-29 DIAGNOSIS — I27.20 PULMONARY HYPERTENSION (HCC): Chronic | ICD-10-CM

## 2024-07-29 DIAGNOSIS — R00.1 BRADYCARDIA: ICD-10-CM

## 2024-07-29 DIAGNOSIS — I50.33 ACUTE ON CHRONIC DIASTOLIC CONGESTIVE HEART FAILURE (HCC): ICD-10-CM

## 2024-07-29 DIAGNOSIS — R26.2 AMBULATORY DYSFUNCTION: ICD-10-CM

## 2024-07-29 DIAGNOSIS — Z79.01 CHRONIC ANTICOAGULATION: Primary | ICD-10-CM

## 2024-07-29 DIAGNOSIS — R79.1 SUPRATHERAPEUTIC INR: ICD-10-CM

## 2024-07-29 PROBLEM — I50.31 ACUTE DIASTOLIC CONGESTIVE HEART FAILURE (HCC): Status: ACTIVE | Noted: 2018-08-14

## 2024-07-29 LAB
2HR DELTA HS TROPONIN: 0 NG/L
ALBUMIN SERPL BCG-MCNC: 2.9 G/DL (ref 3.5–5)
ALP SERPL-CCNC: 95 U/L (ref 34–104)
ALT SERPL W P-5'-P-CCNC: 28 U/L (ref 7–52)
ANION GAP SERPL CALCULATED.3IONS-SCNC: 7 MMOL/L (ref 4–13)
APTT PPP: 94 SECONDS (ref 23–37)
AST SERPL W P-5'-P-CCNC: 25 U/L (ref 13–39)
BASOPHILS # BLD AUTO: 0.05 THOUSANDS/ÂΜL (ref 0–0.1)
BASOPHILS NFR BLD AUTO: 1 % (ref 0–1)
BILIRUB SERPL-MCNC: 0.24 MG/DL (ref 0.2–1)
BILIRUB UR QL STRIP: NEGATIVE
BNP SERPL-MCNC: 454 PG/ML (ref 0–100)
BUN SERPL-MCNC: 31 MG/DL (ref 5–25)
CALCIUM ALBUM COR SERPL-MCNC: 10.3 MG/DL (ref 8.3–10.1)
CALCIUM SERPL-MCNC: 9.4 MG/DL (ref 8.4–10.2)
CARDIAC TROPONIN I PNL SERPL HS: 9 NG/L
CARDIAC TROPONIN I PNL SERPL HS: 9 NG/L
CHLORIDE SERPL-SCNC: 108 MMOL/L (ref 96–108)
CLARITY UR: CLEAR
CO2 SERPL-SCNC: 23 MMOL/L (ref 21–32)
COLOR UR: NORMAL
CREAT SERPL-MCNC: 0.9 MG/DL (ref 0.6–1.3)
EOSINOPHIL # BLD AUTO: 0.56 THOUSAND/ÂΜL (ref 0–0.61)
EOSINOPHIL NFR BLD AUTO: 5 % (ref 0–6)
ERYTHROCYTE [DISTWIDTH] IN BLOOD BY AUTOMATED COUNT: 14.6 % (ref 11.6–15.1)
GFR SERPL CREATININE-BSD FRML MDRD: 58 ML/MIN/1.73SQ M
GLUCOSE SERPL-MCNC: 136 MG/DL (ref 65–140)
GLUCOSE UR STRIP-MCNC: NEGATIVE MG/DL
HCT VFR BLD AUTO: 34.2 % (ref 34.8–46.1)
HGB BLD-MCNC: 10.3 G/DL (ref 11.5–15.4)
HGB UR QL STRIP.AUTO: NEGATIVE
IMM GRANULOCYTES # BLD AUTO: 0.16 THOUSAND/UL (ref 0–0.2)
IMM GRANULOCYTES NFR BLD AUTO: 2 % (ref 0–2)
INR PPP: 6.66 (ref 0.84–1.19)
KETONES UR STRIP-MCNC: NEGATIVE MG/DL
LEUKOCYTE ESTERASE UR QL STRIP: NEGATIVE
LYMPHOCYTES # BLD AUTO: 1.67 THOUSANDS/ÂΜL (ref 0.6–4.47)
LYMPHOCYTES NFR BLD AUTO: 15 % (ref 14–44)
MCH RBC QN AUTO: 26.6 PG (ref 26.8–34.3)
MCHC RBC AUTO-ENTMCNC: 30.1 G/DL (ref 31.4–37.4)
MCV RBC AUTO: 88 FL (ref 82–98)
MONOCYTES # BLD AUTO: 0.76 THOUSAND/ÂΜL (ref 0.17–1.22)
MONOCYTES NFR BLD AUTO: 7 % (ref 4–12)
NEUTROPHILS # BLD AUTO: 7.74 THOUSANDS/ÂΜL (ref 1.85–7.62)
NEUTS SEG NFR BLD AUTO: 70 % (ref 43–75)
NITRITE UR QL STRIP: NEGATIVE
NRBC BLD AUTO-RTO: 0 /100 WBCS
PH UR STRIP.AUTO: 5 [PH]
PLATELET # BLD AUTO: 459 THOUSANDS/UL (ref 149–390)
PMV BLD AUTO: 9.1 FL (ref 8.9–12.7)
POTASSIUM SERPL-SCNC: 4.5 MMOL/L (ref 3.5–5.3)
PROT SERPL-MCNC: 6.2 G/DL (ref 6.4–8.4)
PROT UR STRIP-MCNC: NEGATIVE MG/DL
PROTHROMBIN TIME: 59.4 SECONDS (ref 11.6–14.5)
RBC # BLD AUTO: 3.87 MILLION/UL (ref 3.81–5.12)
SODIUM SERPL-SCNC: 138 MMOL/L (ref 135–147)
SP GR UR STRIP.AUTO: 1.01 (ref 1–1.03)
UROBILINOGEN UR STRIP-ACNC: <2 MG/DL
WBC # BLD AUTO: 10.94 THOUSAND/UL (ref 4.31–10.16)

## 2024-07-29 PROCEDURE — 85025 COMPLETE CBC W/AUTO DIFF WBC: CPT | Performed by: EMERGENCY MEDICINE

## 2024-07-29 PROCEDURE — 85610 PROTHROMBIN TIME: CPT | Performed by: EMERGENCY MEDICINE

## 2024-07-29 PROCEDURE — 99223 1ST HOSP IP/OBS HIGH 75: CPT | Performed by: INTERNAL MEDICINE

## 2024-07-29 PROCEDURE — 81003 URINALYSIS AUTO W/O SCOPE: CPT | Performed by: EMERGENCY MEDICINE

## 2024-07-29 PROCEDURE — 71046 X-RAY EXAM CHEST 2 VIEWS: CPT

## 2024-07-29 PROCEDURE — 84484 ASSAY OF TROPONIN QUANT: CPT | Performed by: EMERGENCY MEDICINE

## 2024-07-29 PROCEDURE — 80053 COMPREHEN METABOLIC PANEL: CPT | Performed by: EMERGENCY MEDICINE

## 2024-07-29 PROCEDURE — 99285 EMERGENCY DEPT VISIT HI MDM: CPT | Performed by: EMERGENCY MEDICINE

## 2024-07-29 PROCEDURE — 85730 THROMBOPLASTIN TIME PARTIAL: CPT | Performed by: EMERGENCY MEDICINE

## 2024-07-29 PROCEDURE — 83880 ASSAY OF NATRIURETIC PEPTIDE: CPT | Performed by: EMERGENCY MEDICINE

## 2024-07-29 PROCEDURE — 99285 EMERGENCY DEPT VISIT HI MDM: CPT

## 2024-07-29 PROCEDURE — 93005 ELECTROCARDIOGRAM TRACING: CPT

## 2024-07-29 PROCEDURE — 36415 COLL VENOUS BLD VENIPUNCTURE: CPT | Performed by: EMERGENCY MEDICINE

## 2024-07-29 RX ORDER — ACETAMINOPHEN 325 MG/1
650 TABLET ORAL EVERY 4 HOURS PRN
Status: DISCONTINUED | OUTPATIENT
Start: 2024-07-29 | End: 2024-08-01 | Stop reason: HOSPADM

## 2024-07-29 RX ORDER — LOSARTAN POTASSIUM 25 MG/1
25 TABLET ORAL DAILY
Status: DISCONTINUED | OUTPATIENT
Start: 2024-07-29 | End: 2024-08-01 | Stop reason: HOSPADM

## 2024-07-29 RX ORDER — PRAVASTATIN SODIUM 80 MG/1
80 TABLET ORAL
Status: DISCONTINUED | OUTPATIENT
Start: 2024-07-29 | End: 2024-08-01 | Stop reason: HOSPADM

## 2024-07-29 RX ORDER — SODIUM CHLORIDE 9 MG/ML
3 INJECTION INTRAVENOUS
Status: DISCONTINUED | OUTPATIENT
Start: 2024-07-29 | End: 2024-08-01 | Stop reason: HOSPADM

## 2024-07-29 RX ORDER — FUROSEMIDE 10 MG/ML
40 INJECTION INTRAMUSCULAR; INTRAVENOUS 2 TIMES DAILY
Status: DISCONTINUED | OUTPATIENT
Start: 2024-07-30 | End: 2024-07-31

## 2024-07-29 RX ORDER — OXYBUTYNIN CHLORIDE 5 MG/1
5 TABLET, EXTENDED RELEASE ORAL DAILY
Status: DISCONTINUED | OUTPATIENT
Start: 2024-07-29 | End: 2024-08-01 | Stop reason: HOSPADM

## 2024-07-29 RX ORDER — LEVOTHYROXINE SODIUM 0.05 MG/1
50 TABLET ORAL
Status: DISCONTINUED | OUTPATIENT
Start: 2024-07-29 | End: 2024-08-01 | Stop reason: HOSPADM

## 2024-07-29 RX ORDER — PANTOPRAZOLE SODIUM 40 MG/1
40 TABLET, DELAYED RELEASE ORAL
Status: DISCONTINUED | OUTPATIENT
Start: 2024-07-30 | End: 2024-08-01 | Stop reason: HOSPADM

## 2024-07-29 RX ORDER — SERTRALINE HYDROCHLORIDE 100 MG/1
100 TABLET, FILM COATED ORAL DAILY
Status: DISCONTINUED | OUTPATIENT
Start: 2024-07-29 | End: 2024-08-01 | Stop reason: HOSPADM

## 2024-07-29 RX ADMIN — PRAVASTATIN SODIUM 80 MG: 80 TABLET ORAL at 17:51

## 2024-07-29 RX ADMIN — OXYBUTYNIN 5 MG: 5 TABLET, FILM COATED, EXTENDED RELEASE ORAL at 17:51

## 2024-07-29 NOTE — ED PROVIDER NOTES
History  Chief Complaint   Patient presents with    Weakness - Generalized     Pt arrived via ems from home c/o weakness for a couple of days -cp -sob -n/v/d recently finished abx for UTI     82 y/o female presents to the ED for generalized weakness and leg swelling x 3 days. She states that she was recently diagnosed with UTI and placed on Macrobid which she finished a few days ago. She states that over the last few days she has had increased generalized weakness and bilateral leg swelling. She states that she has O2 as needed at home and has been needing to use her oxygen more frequently at home. She denies any f/c, cp, abd pain, n/v, d/c, or urinary symptoms. No other complaints.       History provided by:  Patient      Prior to Admission Medications   Prescriptions Last Dose Informant Patient Reported? Taking?   Blood Glucose Monitoring Suppl (OneTouch Verio Reflect) w/Device KIT  Self No No   Sig: Check blood sugars once daily. Please substitute with appropriate alternative as covered by patient's insurance. Dx: E11.65   Calcium Carb-Cholecalciferol (CALCIUM 600 + D PO) 7/29/2024 Self Yes Yes   Sig: Take 1 tablet by mouth 2 (two) times a day   Cranberry 250 MG TABS 7/29/2024 Self Yes Yes   Sig: Take by mouth   Lancets (onetouch ultrasoft) lancets Not Taking Self No No   Sig: Use in the morning Use as instructed   Patient not taking: Reported on 7/29/2024   Omega-3 Fatty Acids (Fish Oil) 300 MG CAPS Not Taking Self Yes No   Sig: Take by mouth   Patient not taking: Reported on 7/29/2024   OneTouch Delica Lancets 33G MISC Not Taking Self No No   Sig: Check blood sugars once daily. Please substitute with appropriate alternative as covered by patient's insurance. Dx: E11.65   Patient not taking: Reported on 7/29/2024   Turmeric (QC Tumeric Complex) 500 MG CAPS Not Taking Self Yes No   Sig: Take by mouth   Patient not taking: Reported on 7/29/2024   acetaminophen (TYLENOL) 500 mg tablet 7/28/2024 Self Yes Yes   Sig:  Take 500 mg by mouth every 6 (six) hours as needed   aspirin (ECOTRIN LOW STRENGTH) 81 mg EC tablet 2024 Self No Yes   Sig: Take 1 tablet (81 mg total) by mouth daily   b complex vitamins capsule 2024 Self Yes Yes   Sig: Take 1 capsule by mouth 2 (two) times a day     ferrous sulfate 324 (65 Fe) mg 2024 Self No Yes   Sig: Take 1 tablet every other day.   furosemide (LASIX) 20 mg tablet 2024 Self No Yes   Sig: Take 1 tablet (20 mg total) by mouth daily as needed (wt gain)   glucose blood (OneTouch Verio) test strip Not Taking Self No No   Sig: Check blood sugars once daily. Please substitute with appropriate alternative as covered by patient's insurance. Dx: E11.65   Patient not taking: Reported on 2024   glucose blood test strip Not Taking Self No No   Sig: Use 1 each in the morning Use as instructed   Patient not taking: Reported on 2024   hydrocortisone 2.5 % cream More than a month Self No No   Sig: Apply topically 2 (two) times a day   levothyroxine 50 mcg tablet 2024 Self No Yes   Sig: TAKE 1 TABLET BY MOUTH EVERY DAY   ofloxacin (OCUFLOX) 0.3 % ophthalmic solution  Self No No   Si drops twice daily to left ear x 10 days.   Patient not taking: Reported on 2024   olmesartan (BENICAR) 5 mg tablet 2024 Self No Yes   Sig: TAKE 2 TABLETS BY MOUTH EVERY DAY   omeprazole (PriLOSEC) 40 MG capsule 2024 Self No Yes   Sig: TAKE 1 CAPSULE BY MOUTH TWICE A DAY   oxyCODONE (Roxicodone) 5 immediate release tablet  Self No No   Sig: Take 0.5 tablets (2.5 mg total) by mouth 2 (two) times a day as needed for moderate pain Max Daily Amount: 5 mg   Patient not taking: Reported on 2024   oxybutynin (DITROPAN-XL) 5 mg 24 hr tablet Unknown Self No No   Sig: Take 1 tablet (5 mg total) by mouth daily   oxygen gas 2024 Self Yes Yes   Sig: Inhale 2 L/min continuous. Indications: copd   pregabalin (LYRICA) 25 mg capsule 2024  No Yes   Sig: Take 1 capsule (25 mg total) by  mouth daily at bedtime for 3 days, THEN 1 capsule (25 mg total) 2 (two) times a day for 27 days.   sertraline (ZOLOFT) 100 mg tablet 7/29/2024 Self No Yes   Sig: TAKE 1 TABLET BY MOUTH EVERY DAY   simvastatin (ZOCOR) 40 mg tablet 7/28/2024 Self No Yes   Sig: TAKE 1 TABLET BY MOUTH EVERYDAY AT BEDTIME   triamcinolone (KENALOG) 0.1 % cream Not Taking Self No No   Sig: Apply topically 2 (two) times a day   Patient not taking: Reported on 7/29/2024   warfarin (Coumadin) 2.5 mg tablet 7/28/2024 Self No Yes   Sig: Take 1 tablet (2.5mg) Mon-Sat. Take 2 tablets (5mg) on Sun or as directed      Facility-Administered Medications: None       Past Medical History:   Diagnosis Date    Anemia 08/22/2018    Anxiety     Arthritis     AVB (atrioventricular block)     first degree    Cataract     CHF (congestive heart failure) (HCC)     COPD, mild (HCC)     Coronary artery disease     Dislocation of right shoulder joint     Frequent UTI     GERD (gastroesophageal reflux disease)     H/O: pneumonia     Heme positive stool     Hyperlipidemia     Hypertension     Hypothyroidism     Morbid obesity with BMI of 50.0-59.9, adult (HCC)     Obesity, morbid (HCC) 08/22/2018    JANICE on CPAP     Pulmonary hypertension (HCC) 08/22/2018    Severe aortic stenosis     Simple goiter     Skin cyst     within the armpits, right    Wears glasses        Past Surgical History:   Procedure Laterality Date    BREAST BIOPSY      CARDIAC CATHETERIZATION      CARPAL TUNNEL RELEASE Bilateral     CHOLECYSTECTOMY      DILATION AND CURETTAGE OF UTERUS      HYSTEROSCOPY      MASTOID SURGERY      IL COLONOSCOPY FLX DX W/COLLJ SPEC WHEN PFRMD N/A 9/6/2018    Procedure: COLONOSCOPY;  Surgeon: Shree Sosa III, MD;  Location: [facility];  Service: Gastroenterology    IL ECHO TRANSESOPHAG R-T 2D W/PRB IMG ACQUISJ I&R N/A 10/9/2018    Procedure: INTRA-OP TRANSESOPHAGEAL ECHOCARDIOGRAM (GARRISON);  Surgeon: Kushal Camarena DO;  Location: [facility];  Service: Cardiac  Surgery    IA ESOPHAGOGASTRODUODENOSCOPY TRANSORAL DIAGNOSTIC N/A 8/31/2018    Procedure: ESOPHAGOGASTRODUODENOSCOPY (EGD);  Surgeon: Shree Sosa III, MD;  Location: [facility];  Service: Gastroenterology    IA REPLACE AORTIC VALVE OPENFEMORAL ARTERY APPROACH N/A 10/9/2018    Procedure: REPLACEMENT AORTIC VALVE TRANSCATHETER (TAVR) TRANSFEMORAL W/ 23 MM MENDOZA NOE S3 VALVE (ACCESS OF LEFT);  Surgeon: Kushal Camarena DO;  Location: [facility];  Service: Cardiac Surgery    TOTAL HIP ARTHROPLASTY Left 2007    TOTAL KNEE ARTHROPLASTY Bilateral        Family History   Problem Relation Age of Onset    Diabetes Mother     Stroke Mother     Cancer Father     Lung cancer Father     Diabetes Sister     Heart disease Sister     Hypertension Sister     Coronary artery disease Family     Diabetes Family     Hypertension Family     Cancer Family     Stroke Family     Thyroid disease Neg Hx      I have reviewed and agree with the history as documented.    E-Cigarette/Vaping    E-Cigarette Use Never User      E-Cigarette/Vaping Substances    Nicotine No     THC No     CBD No     Flavoring No     Other No     Unknown No      Social History     Tobacco Use    Smoking status: Never    Smokeless tobacco: Never   Vaping Use    Vaping status: Never Used   Substance Use Topics    Alcohol use: Not Currently    Drug use: Never       Review of Systems   Constitutional:  Negative for chills and fever.   HENT:  Negative for congestion, ear pain and sore throat.    Eyes:  Negative for pain and visual disturbance.   Respiratory:  Negative for cough, shortness of breath and wheezing.    Cardiovascular:  Positive for leg swelling. Negative for chest pain.   Gastrointestinal:  Negative for abdominal pain, diarrhea, nausea and vomiting.   Genitourinary:  Negative for dysuria, frequency, hematuria and urgency.   Musculoskeletal:  Negative for neck pain and neck stiffness.   Skin:  Negative for rash and wound.   Neurological:  Positive for  weakness. Negative for numbness and headaches.   Psychiatric/Behavioral:  Negative for agitation and confusion.    All other systems reviewed and are negative.      Physical Exam  Physical Exam  Vitals and nursing note reviewed.   Constitutional:       Appearance: She is well-developed.   HENT:      Head: Normocephalic and atraumatic.   Eyes:      Pupils: Pupils are equal, round, and reactive to light.   Cardiovascular:      Rate and Rhythm: Normal rate and regular rhythm.   Pulmonary:      Effort: Pulmonary effort is normal.      Breath sounds: Normal breath sounds.   Abdominal:      General: Bowel sounds are normal.      Palpations: Abdomen is soft.   Musculoskeletal:         General: Normal range of motion.      Cervical back: Normal range of motion and neck supple.      Right lower leg: Edema present.      Left lower leg: Edema present.      Comments: 1+ pitting edema bilaterally    Skin:     General: Skin is warm and dry.   Neurological:      General: No focal deficit present.      Mental Status: She is alert and oriented to person, place, and time.      Comments: No focal deficits         Vital Signs  ED Triage Vitals   Temperature Pulse Respirations Blood Pressure SpO2   07/29/24 1209 07/29/24 1209 07/29/24 1209 07/29/24 1209 07/29/24 1238   97.6 °F (36.4 °C) (!) 45 22 116/57 96 %      Temp Source Heart Rate Source Patient Position - Orthostatic VS BP Location FiO2 (%)   07/29/24 1209 07/29/24 1209 07/29/24 1209 07/29/24 1209 --   Oral Monitor Sitting Right arm       Pain Score       07/29/24 1615       No Pain           Vitals:    07/30/24 0939 07/30/24 1030 07/30/24 1119 07/30/24 1139   BP: 112/53 112/53 129/58    Pulse: (!) 52 (!) 44 (!) 44 61   Patient Position - Orthostatic VS:             Visual Acuity      ED Medications  Medications   sodium chloride (PF) 0.9 % injection 3 mL (has no administration in time range)   acetaminophen (TYLENOL) tablet 650 mg (650 mg Oral Given 7/30/24 1185)   furosemide  (LASIX) injection 40 mg (40 mg Intravenous Given 7/30/24 0941)   aspirin (ECOTRIN LOW STRENGTH) EC tablet 81 mg (81 mg Oral Given 7/30/24 0942)   levothyroxine tablet 50 mcg (50 mcg Oral Given 7/30/24 0530)   losartan (COZAAR) tablet 25 mg (25 mg Oral Not Given 7/30/24 0900)   pantoprazole (PROTONIX) EC tablet 40 mg (40 mg Oral Given 7/30/24 0530)   oxybutynin (DITROPAN-XL) 24 hr tablet 5 mg (5 mg Oral Given 7/30/24 0942)   sertraline (ZOLOFT) tablet 100 mg (100 mg Oral Given 7/30/24 0942)   pravastatin (PRAVACHOL) tablet 80 mg (80 mg Oral Given 7/29/24 1751)   magnesium sulfate 4 g/100 mL IVPB (premix) 4 g (4 g Intravenous New Bag 7/30/24 1143)       Diagnostic Studies  Results Reviewed       Procedure Component Value Units Date/Time    Basic metabolic panel [[id number]]  (Abnormal) Collected: 07/30/24 0436    Lab Status: Final result Specimen: Blood from Arm, Left Updated: 07/30/24 0531     Sodium 139 mmol/L      Potassium 4.7 mmol/L      Chloride 108 mmol/L      CO2 25 mmol/L      ANION GAP 6 mmol/L      BUN 26 mg/dL      Creatinine 0.73 mg/dL      Glucose 93 mg/dL      Calcium 9.6 mg/dL      eGFR 75 ml/min/1.73sq m     Narrative:      National [employer] guidelines for Chronic Kidney Disease (CKD):     Stage 1 with normal or high GFR (GFR > 90 mL/min/1.73 square meters)    Stage 2 Mild CKD (GFR = 60-89 mL/min/1.73 square meters)    Stage 3A Moderate CKD (GFR = 45-59 mL/min/1.73 square meters)    Stage 3B Moderate CKD (GFR = 30-44 mL/min/1.73 square meters)    Stage 4 Severe CKD (GFR = 15-29 mL/min/1.73 square meters)    Stage 5 End Stage CKD (GFR <15 mL/min/1.73 square meters)  Note: GFR calculation is accurate only with a steady state creatinine    Magnesium [[id number]]  (Abnormal) Collected: 07/30/24 0436    Lab Status: Final result Specimen: Blood from Arm, Left Updated: 07/30/24 0531     Magnesium 1.6 mg/dL     CBC and differential [[id number]]  (Abnormal) Collected: 07/30/24 0436    Lab Status:  Final result Specimen: Blood from Arm, Left Updated: 07/30/24 0507     WBC 11.37 Thousand/uL      RBC 3.93 Million/uL      Hemoglobin 10.4 g/dL      Hematocrit 34.6 %      MCV 88 fL      MCH 26.5 pg      MCHC 30.1 g/dL      RDW 14.6 %      MPV 9.4 fL      Platelets 456 Thousands/uL      nRBC 0 /100 WBCs      Segmented % 69 %      Immature Grans % 1 %      Lymphocytes % 18 %      Monocytes % 7 %      Eosinophils Relative 5 %      Basophils Relative 0 %      Absolute Neutrophils 7.72 Thousands/µL      Absolute Immature Grans 0.16 Thousand/uL      Absolute Lymphocytes 2.04 Thousands/µL      Absolute Monocytes 0.82 Thousand/µL      Eosinophils Absolute 0.59 Thousand/µL      Basophils Absolute 0.04 Thousands/µL     HS Troponin I 2hr [[id number]]  (Normal) Collected: 07/29/24 1432    Lab Status: Final result Specimen: Blood from Arm, Left Updated: 07/29/24 1504     hs TnI 2hr 9 ng/L      Delta 2hr hsTnI 0 ng/L     UA w Reflex to Microscopic w Reflex to Culture [[id number]] Collected: 07/29/24 1432    Lab Status: Final result Specimen: Urine, Other Updated: 07/29/24 1442     Color, UA Light Yellow     Clarity, UA Clear     Specific Gravity, UA 1.006     pH, UA 5.0     Leukocytes, UA Negative     Nitrite, UA Negative     Protein, UA Negative mg/dl      Glucose, UA Negative mg/dl      Ketones, UA Negative mg/dl      Urobilinogen, UA <2.0 mg/dl      Bilirubin, UA Negative     Occult Blood, UA Negative    Protime-INR [[id number]]  (Abnormal) Collected: 07/29/24 1330    Lab Status: Final result Specimen: Blood from Arm, Left Updated: 07/29/24 1354     Protime 59.4 seconds      INR 6.66    APTT [[id number]]  (Abnormal) Collected: 07/29/24 1330    Lab Status: Final result Specimen: Blood from Arm, Left Updated: 07/29/24 1354     PTT 94 seconds     HS Troponin 0hr (reflex protocol) [[id number]]  (Normal) Collected: 07/29/24 1242    Lab Status: Final result Specimen: Blood from Arm, Left Updated: 07/29/24 1309     hs TnI 0hr 9 ng/L      B-Type Natriuretic Peptide(BNP) [[id number]]  (Abnormal) Collected: 07/29/24 1242    Lab Status: Final result Specimen: Blood from Arm, Left Updated: 07/29/24 1308      pg/mL     Comprehensive metabolic panel [[id number]]  (Abnormal) Collected: 07/29/24 1242    Lab Status: Final result Specimen: Blood from Arm, Left Updated: 07/29/24 1304     Sodium 138 mmol/L      Potassium 4.5 mmol/L      Chloride 108 mmol/L      CO2 23 mmol/L      ANION GAP 7 mmol/L      BUN 31 mg/dL      Creatinine 0.90 mg/dL      Glucose 136 mg/dL      Calcium 9.4 mg/dL      Corrected Calcium 10.3 mg/dL      AST 25 U/L      ALT 28 U/L      Alkaline Phosphatase 95 U/L      Total Protein 6.2 g/dL      Albumin 2.9 g/dL      Total Bilirubin 0.24 mg/dL      eGFR 58 ml/min/1.73sq m     Narrative:      National Kid[employer] Disease Foundation guidelines for Chronic Kidney Disease (CKD):     Stage 1 with normal or high GFR (GFR > 90 mL/min/1.73 square meters)    Stage 2 Mild CKD (GFR = 60-89 mL/min/1.73 square meters)    Stage 3A Moderate CKD (GFR = 45-59 mL/min/1.73 square meters)    Stage 3B Moderate CKD (GFR = 30-44 mL/min/1.73 square meters)    Stage 4 Severe CKD (GFR = 15-29 mL/min/1.73 square meters)    Stage 5 End Stage CKD (GFR <15 mL/min/1.73 square meters)  Note: GFR calculation is accurate only with a steady state creatinine    CBC and differential [[id number]]  (Abnormal) Collected: 07/29/24 1242    Lab Status: Final result Specimen: Blood from Arm, Left Updated: 07/29/24 1247     WBC 10.94 Thousand/uL      RBC 3.87 Million/uL      Hemoglobin 10.3 g/dL      Hematocrit 34.2 %      MCV 88 fL      MCH 26.6 pg      MCHC 30.1 g/dL      RDW 14.6 %      MPV 9.1 fL      Platelets 459 Thousands/uL      nRBC 0 /100 WBCs      Segmented % 70 %      Immature Grans % 2 %      Lymphocytes % 15 %      Monocytes % 7 %      Eosinophils Relative 5 %      Basophils Relative 1 %      Absolute Neutrophils 7.74 Thousands/µL      Absolute Immature Grans 0.16  Thousand/uL      Absolute Lymphocytes 1.67 Thousands/µL      Absolute Monocytes 0.76 Thousand/µL      Eosinophils Absolute 0.56 Thousand/µL      Basophils Absolute 0.05 Thousands/µL                    X-ray chest 2 views   Final Result by Cedric Tavares MD (07/29 1401)      No acute cardiopulmonary disease.            Workstation performed: [id number]                    Procedures  Procedures         ED Course  ED Course as of 07/30/24 1200   Mon Jul 29, 2024   1218 Recently finished abx last week for UTI. Frequency dysuria. Saw family doctor and placed on macrobid finished last week. Unsure if it helped. Noticed bilateral feet swelling x 3 days. Has been taking water pills -lasix 20mg Qd denies sob. Has O2 as needed. Noticed blood pressure low as well.    1404 POCT INR(!!): 6.66  Will hold coumadin                                                Medical Decision Making  82 y/o female with generalized weakness - will get labs, trop/EKG, UA, and covid testing.     Amount and/or Complexity of Data Reviewed  Labs: ordered. Decision-making details documented in ED Course.  Radiology: ordered.    Risk  Decision regarding hospitalization.                 Disposition  Final diagnoses:   Generalized weakness   Bradycardia   Supratherapeutic INR     Time reflects when diagnosis was documented in both MDM as applicable and the Disposition within this note       Time User Action Codes Description Comment    7/29/2024  2:43 PM John Ritchie Add [Z79.01] Chronic anticoagulation     7/29/2024  2:45 PM Danii Valentine Add [R53.1] Generalized weakness     7/29/2024  2:46 PM Danii Valentine Add [R00.1] Bradycardia     7/29/2024  2:46 PM Danii Valentine Add [R79.1] Supratherapeutic INR           ED Disposition       ED Disposition   Admit    Condition   Stable    Date/Time   Mon Jul 29, 2024  2:45 PM    Comment   Case was discussed with MONSTER and the patient's admission status was agreed to be Admission Status:  observation status to the service of Dr. Ritchie .               Follow-up Information    None         Current Discharge Medication List        CONTINUE these medications which have NOT CHANGED    Details   acetaminophen (TYLENOL) 500 mg tablet Take 500 mg by mouth every 6 (six) hours as needed      aspirin (ECOTRIN LOW STRENGTH) 81 mg EC tablet Take 1 tablet (81 mg total) by mouth daily  Qty: 100 tablet, Refills: 0    Associated Diagnoses: S/P TAVR (transcatheter aortic valve replacement); Aortic stenosis, severe      b complex vitamins capsule Take 1 capsule by mouth 2 (two) times a day        Calcium Carb-Cholecalciferol (CALCIUM 600 + D PO) Take 1 tablet by mouth 2 (two) times a day      Cranberry 250 MG TABS Take by mouth      ferrous sulfate 324 (65 Fe) mg Take 1 tablet every other day.  Qty: 90 tablet, Refills: 1    Associated Diagnoses: Iron deficiency anemia secondary to inadequate dietary iron intake      furosemide (LASIX) 20 mg tablet Take 1 tablet (20 mg total) by mouth daily as needed (wt gain)  Qty: 30 tablet, Refills: 0    Associated Diagnoses: Lower extremity edema      levothyroxine 50 mcg tablet TAKE 1 TABLET BY MOUTH EVERY DAY  Qty: 100 tablet, Refills: 1    Associated Diagnoses: Acquired hypothyroidism      olmesartan (BENICAR) 5 mg tablet TAKE 2 TABLETS BY MOUTH EVERY DAY  Qty: 180 tablet, Refills: 3    Comments: DX Code Needed  .  Associated Diagnoses: Essential hypertension      omeprazole (PriLOSEC) 40 MG capsule TAKE 1 CAPSULE BY MOUTH TWICE A DAY  Qty: 180 capsule, Refills: 1    Associated Diagnoses: Gastroesophageal reflux disease with esophagitis      oxygen gas Inhale 2 L/min continuous. Indications: copd      pregabalin (LYRICA) 25 mg capsule Take 1 capsule (25 mg total) by mouth daily at bedtime for 3 days, THEN 1 capsule (25 mg total) 2 (two) times a day for 27 days.  Qty: 57 capsule, Refills: 0    Associated Diagnoses: DDD (degenerative disc disease), lumbar; Neuropathy       sertraline (ZOLOFT) 100 mg tablet TAKE 1 TABLET BY MOUTH EVERY DAY  Qty: 90 tablet, Refills: 1    Associated Diagnoses: Depression with anxiety      simvastatin (ZOCOR) 40 mg tablet TAKE 1 TABLET BY MOUTH EVERYDAY AT BEDTIME  Qty: 100 tablet, Refills: 1    Associated Diagnoses: Hyperlipidemia, unspecified hyperlipidemia type      warfarin (Coumadin) 2.5 mg tablet Take 1 tablet (2.5mg) Mon-Sat. Take 2 tablets (5mg) on Sun or as directed  Qty: 102 tablet, Refills: 3    Associated Diagnoses: Long term (current) use of anticoagulants      Blood Glucose Monitoring Suppl (OneTouch Verio Reflect) w/Device KIT Check blood sugars once daily. Please substitute with appropriate alternative as covered by patient's insurance. Dx: E11.65  Qty: 1 kit, Refills: 0    Comments: Please substitute with appropriate alternative as covered by patient's insurance  Associated Diagnoses: Prediabetes      !! glucose blood (OneTouch Verio) test strip Check blood sugars once daily. Please substitute with appropriate alternative as covered by patient's insurance. Dx: E11.65  Qty: 100 each, Refills: 3    Comments: Please substitute with appropriate alternative as covered by patient's insurance  Associated Diagnoses: Prediabetes      !! glucose blood test strip Use 1 each in the morning Use as instructed  Qty: 100 each, Refills: 4    Associated Diagnoses: Prediabetes      hydrocortisone 2.5 % cream Apply topically 2 (two) times a day  Qty: 28 g, Refills: 1    Associated Diagnoses: Rash      !! Lancets (onetouch ultrasoft) lancets Use in the morning Use as instructed  Qty: 100 each, Refills: 1    Associated Diagnoses: Urinary frequency; Prediabetes      ofloxacin (OCUFLOX) 0.3 % ophthalmic solution 5 drops twice daily to left ear x 10 days.  Qty: 10 mL, Refills: 1    Associated Diagnoses: Chronic mastoiditis of left side      Omega-3 Fatty Acids (Fish Oil) 300 MG CAPS Take by mouth      !! OneTouch Delica Lancets 33G MISC Check blood sugars once  daily. Please substitute with appropriate alternative as covered by patient's insurance. Dx: E11.65  Qty: 100 each, Refills: 3    Comments: Please substitute with appropriate alternative as covered by patient's insurance  Associated Diagnoses: Prediabetes      oxybutynin (DITROPAN-XL) 5 mg 24 hr tablet Take 1 tablet (5 mg total) by mouth daily  Qty: 90 tablet, Refills: 1    Associated Diagnoses: Urge incontinence      oxyCODONE (Roxicodone) 5 immediate release tablet Take 0.5 tablets (2.5 mg total) by mouth 2 (two) times a day as needed for moderate pain Max Daily Amount: 5 mg  Qty: 15 tablet, Refills: 0    Associated Diagnoses: Spinal stenosis of lumbar region with neurogenic claudication; Lumbar radiculopathy; Chronic pain syndrome; Chronic bilateral low back pain with left-sided sciatica      triamcinolone (KENALOG) 0.1 % cream Apply topically 2 (two) times a day  Qty: 15 g, Refills: 1    Associated Diagnoses: Rash      Turmeric (QC Tumeric Complex) 500 MG CAPS Take by mouth       !! - Potential duplicate medications found. Please discuss with provider.          No discharge procedures on file.    PDMP Review         Value Time User    PDMP Reviewed  Yes 7/16/2024 11:36 AM MABEL Oliveira            ED Provider  Electronically Signed by             Danii Valentine DO  07/30/24 1200

## 2024-07-29 NOTE — TELEPHONE ENCOUNTER
"Pt was last seen on 3/8/2024. Received call from pt stating starting about 5 days ago, she noticed her b/l ankles starting to swell. Denies any swelling in the calf nor elsewhere. She stated she does have a prescription for Lasix 20mg to take prn. She stated she started taking the Lasix on Saturday and is not sure how much it is helping. She stated she may have noticed a small decrease in the edema. She did state she is urinating more. Denies chest pain nor sob. Of note, she does wear 2 L of oxygen always.     Pt stated her BP today is 99/83, HR is 48, then taken again is 44. She stated she has been tired/fatigued but stated that has been going on for awhile. She is currently taking olmesartan 5mg daily however she did not take it yet today.    Advised pt will send a message to the provider to review and advise.      Reason for Disposition   MILD swelling of both ankles (i.e., pedal edema) AND new-onset or worsening    Answer Assessment - Initial Assessment Questions  1. ONSET: \"When did the swelling start?\" (e.g., minutes, hours, days)      5 days. Pt started taking Lasix 20mg daily starting on Saturday but states is not sure if it helped the swelling, stating she maybe thinks it went down a little   2. LOCATION: \"What part of the leg is swollen?\"  \"Are both legs swollen or just one leg?\"      B/l ankles/ foot  3. SEVERITY: \"How bad is the swelling?\" (e.g., localized; mild, moderate, severe)   - Localized - small area of swelling localized to one leg   - MILD pedal edema - swelling limited to foot and ankle, pitting edema < 1/4 inch (6 mm) deep, rest and elevation eliminate most or all swelling   - MODERATE edema - swelling of lower leg to knee, pitting edema > 1/4 inch (6 mm) deep, rest and elevation only partially reduce swelling   - SEVERE edema - swelling extends above knee, facial or hand swelling present       Mild - does pit   4. REDNESS: \"Does the swelling look red or infected?\"      denies  5. PAIN: \"Is " "the swelling painful to touch?\" If Yes, ask: \"How painful is it?\"   (Scale 1-10; mild, moderate or severe)      denies  6. FEVER: \"Do you have a fever?\" If Yes, ask: \"What is it, how was it measured, and when did it start?\"       denies  7. CAUSE: \"What do you think is causing the leg swelling?\"      unsure  8. MEDICAL HISTORY: \"Do you have a history of heart failure, kidney disease, liver failure, or cancer?\"      Heart failure  9. RECURRENT SYMPTOM: \"Have you had leg swelling before?\" If Yes, ask: \"When was the last time?\" \"What happened that time?\"      Yes - stated the Lasix took it away in the past   10. OTHER SYMPTOMS: \"Do you have any other symptoms?\" (e.g., chest pain, difficulty breathing)        Did have a uti - was treated with an abx recently. Denies chest pain, sob. Does wear 2 L of oxygen. Denies any swelling elsewhere.     BP: 99/83, HR 48, taken again was 44. Does stated she has been tired/sleepy for awhile.    Protocols used: Leg Swelling and Edema-ADULT-[place]    "

## 2024-07-29 NOTE — PLAN OF CARE
Problem: PAIN - ADULT  Goal: Verbalizes/displays adequate comfort level or baseline comfort level  Description: Interventions:  - Encourage patient to monitor pain and request assistance  - Assess pain using appropriate pain scale  - Administer analgesics based on type and severity of pain and evaluate response  - Implement non-pharmacological measures as appropriate and evaluate response  - Consider cultural and social influences on pain and pain management  - Notify physician/advanced practitioner if interventions unsuccessful or patient reports new pain  Outcome: Progressing     Problem: INFECTION - ADULT  Goal: Absence or prevention of progression during hospitalization  Description: INTERVENTIONS:  - Assess and monitor for signs and symptoms of infection  - Monitor lab/diagnostic results  - Monitor all insertion sites, i.e. indwelling lines, tubes, and drains  - Monitor endotracheal if appropriate and nasal secretions for changes in amount and color  - [place] appropriate cooling/warming therapies per order  - Administer medications as ordered  - Instruct and encourage patient and family to use good hand hygiene technique  - Identify and instruct in appropriate isolation precautions for identified infection/condition  Outcome: Progressing  Goal: Absence of fever/infection during neutropenic period  Description: INTERVENTIONS:  - Monitor WBC    Outcome: Progressing     Problem: SAFETY ADULT  Goal: Patient will remain free of falls  Description: INTERVENTIONS:  - Educate patient/family on patient safety including physical limitations  - Instruct patient to call for assistance with activity   - Consult OT/PT to assist with strengthening/mobility   - Keep Call bell within reach  - Keep bed low and locked with side rails adjusted as appropriate  - Keep care items and personal belongings within reach  - Initiate and maintain comfort rounds  - Make Fall Risk Sign visible to staff  - Offer Toileting every 2 Hours,  in advance of need  - Initiate/Maintain bed alarm  - Obtain necessary fall risk management equipment: call bell within reach  - Apply yellow socks and bracelet for high fall risk patients  - Consider moving patient to room near nurses station  Outcome: Progressing  Goal: Maintain or return to baseline ADL function  Description: INTERVENTIONS:  -  Assess patient's ability to carry out ADLs; assess patient's baseline for ADL function and identify physical deficits which impact ability to perform ADLs (bathing, care of mouth/teeth, toileting, grooming, dressing, etc.)  - Assess/evaluate cause of self-care deficits   - Assess range of motion  - Assess patient's mobility; develop plan if impaired  - Assess patient's need for assistive devices and provide as appropriate  - Encourage maximum independence but intervene and supervise when necessary  - Involve family in performance of ADLs  - Assess for home care needs following discharge   - Consider OT consult to assist with ADL evaluation and planning for discharge  - Provide patient education as appropriate  Outcome: Progressing  Goal: Maintains/Returns to pre admission functional level  Description: INTERVENTIONS:  - Perform AM-PAC 6 Click Basic Mobility/ Daily Activity assessment daily.  - Set and communicate daily mobility goal to care team and patient/family/caregiver.   - Collaborate with rehabilitation services on mobility goals if consulted  - Perform Range of Motion 4 times a day.  - Reposition patient every 2 hours.  - Dangle patient 3 times a day  - Stand patient 3 times a day  - Ambulate patient 3 times a day  - Out of bed to chair 3 times a day   - Out of bed for meals 3 times a day  - Out of bed for toileting  - Record patient progress and toleration of activity level   Outcome: Progressing     Problem: DISCHARGE PLANNING  Goal: Discharge to home or other facility with appropriate resources  Description: INTERVENTIONS:  - Identify barriers to discharge  w/patient and caregiver  - Arrange for needed discharge resources and transportation as appropriate  - Identify discharge learning needs (meds, wound care, etc.)  - Arrange for interpretive services to assist at discharge as needed  - Refer to Case Management Department for coordinating discharge planning if the patient needs post-hospital services based on physician/advanced practitioner order or complex needs related to functional status, cognitive ability, or social support system  Outcome: Progressing     Problem: Knowledge Deficit  Goal: Patient/family/caregiver demonstrates understanding of disease process, treatment plan, medications, and discharge instructions  Description: Complete learning assessment and assess knowledge base.  Interventions:  - Provide teaching at level of understanding  - Provide teaching via preferred learning methods  Outcome: Progressing     Problem: Decreased Cardiac Output  Goal: Cardiac output adequate for individual needs  Description: INTERVENTIONS: Monitor for signs and symptoms of decreased cardiac output   - Monitor for dyspnea with exertion and at rest  - Monitor for orthopnea  - Monitor for signs of tachycardia. Place patient on telemetry monitoring.  - Assess patient for jugular vein distention  - Assess patient for lower extremity edema and poor peripheral perfusion   - Auscultate lung sound for Fine bibasilar crackles   - Monitor for cardiac arrythmias   - Administer beta blockers, antiarrhythmic, and blood pressure medications as ordered    Outcome: Progressing     Problem: Impaired Gas Exchange  Goal: Optimize oxygenation and ensure adequate ventilation  Description: INTERVENTIONS: Monitor for signs and symptoms of respiratory distress                - Elevate HOB or use high fowlers to promote lung expansion                - Administer oxygen as ordered to maintain adequate oxygenation                - Encourage use of IS to promote lung expansion and prevent PN                 - Monitor ABGs to assess oxygenation status                - Monitor blood oxygen level to maintain adequate oxygenation                - Encourage cough and deep breathing exercises to promote lung expansion                - Monitor patient's mental status for increased confusion    Outcome: Progressing     Problem: Excess Fluid Volume  Goal: Patient is able to achieve and maintain homeostasis  Description: INTERVENTIONS: Monitor for sign and symptoms of fluid overload  - Evaluate LE edema every shift  - Elevate LE to prevent dependent edema  - Apply KAJAL stockings as ordered   - Monitor ankle circumference daily  - Assess for jugular vein distention  - Evaluate provider orders for the CHF diuretic algorithm. Administer diuretics as ordered  - Weigh the patient daily at 0600 and report a weight gain of five pounds or more   - Strict intake and output  - Monitor fluid intake and adhere to fluid restrictions  - Assess lung sounds every shift and as needed  - Monitor vital signs and lab values (CBC, chem, BUN, BNP)  - Measure and document urine output    Outcome: Progressing     Problem: Activity Intolerance  Goal: Patient is able to perform activities within their limitations  Description: INTERVENTIONS:                       -   Alternate periods of activity with periods of rest                 -   Patients is able to maintain normal vitals heart rhythm during activity                 -   Gradually increase activity and exercise as patient can tolerate                 -   Monitor blood pressure and heart before and after exercise                  -   Monitor blood oxygen saturation during activity and apply oxygen as needed    Outcome: Progressing     Problem: Knowledge Deficit  Goal: Patient is able to verbalize understanding of Heart Failure after education  Description: INTERVENTIONS:  - Educate the patient and family on signs and symptoms of HF  - Provide the patient with HF education and HF zone  tool  - Educate on the importance of daily weight in the AM and reporting a weight gain               of 3 or more pounds to their primary care physician  - Monitor for SOB  - Maintain and sodium and fluid restriction  - Educate the patient on the importance of medications such as: diuretics, betablockers,               antiarrhythmics and their purpose, dose, route, side effects and labs               if they are needed    Outcome: Progressing

## 2024-07-29 NOTE — ASSESSMENT & PLAN NOTE
Patient on Coumadin presented with INR 6.6  Hold home Coumadin  Repeat INR in the morning  Currently no signs of overt bleeding

## 2024-07-29 NOTE — H&P
[facility]  H&P  Name: Marisol Otooel 83 y.o. female I MRN: [id number]  Unit/Bed#: ED 11 I Date of Admission: 7/29/2024   Date of Service: 7/29/2024 I Hospital Day: 0      Assessment & Plan   * Acute on chronic diastolic congestive heart failure (HCC)  Assessment & Plan  Presented with volume overload on exam peripheral edema  Reports compliance with oral diuretics  Denies any shortness of breath  BNP noted to be 454  Will place on IV Lasix twice daily  Echo in 2022 revealed normal EF  Will repeat echo        Chronic anticoagulation  Assessment & Plan  Patient on Coumadin presented with INR 6.6  Hold home Coumadin  Repeat INR in the morning  Currently no signs of overt bleeding    Bradycardia  Assessment & Plan  Heart rate noted to be approximately 45 on arrival  Appears to be sinus bradycardia  Will monitor on telemetry for now      Ambulatory dysfunction  Assessment & Plan  Presented with generalized weakness  PT/OT eval    Depression with anxiety  Assessment & Plan  Continue Zoloft    COPD, mild (HCC)  Assessment & Plan  Not in acute exacerbation  Continue home inhalers           VTE Prophylaxis: Pharmacologic VTE Prophylaxis contraindicated due to supratherapeutic INR   / sequential compression device   Code Status: full code  POLST: There is no POLST form on file for this patient (pre-hospital)  Discussion with family: pt    Anticipated Length of Stay:  Patient will be admitted on an Emergency basis with an anticipated length of stay of  < 2 midnights.   Justification for Hospital Stay: CHF    Total Time for Visit, including Counseling / Coordination of Care: 60 minutes.  Greater than 50% of this total time spent on direct patient counseling and coordination of care.    Chief Complaint:   Lower extremity swelling    History of Present Illness:    Marisol Otoole is a 83 y.o. female with past medical history significant CHF, hypertension, COPD units presented with generalized weakness and  lower extremity edema.  Reports taking oral diuretics without significant improvement.  Reports increasing weakness over the past few days.  Otherwise denies any acute complaints.  Nuys chest pain, shortness breath, fevers, chills, abdominal pain, nausea, vomiting or any other complaints    Review of Systems:    Review of Systems   Constitutional:  Negative for activity change, appetite change, chills, diaphoresis, fever and unexpected weight change.   HENT:  Negative for congestion, facial swelling and rhinorrhea.    Eyes:  Negative for photophobia and visual disturbance.   Respiratory:  Negative for cough, shortness of breath and wheezing.    Cardiovascular:  Negative for chest pain and palpitations.   Gastrointestinal:  Negative for abdominal pain, blood in stool, constipation, diarrhea, nausea and vomiting.   Genitourinary:  Negative for decreased urine volume, difficulty urinating, dysuria, flank pain, frequency, hematuria and urgency.   Musculoskeletal:  Negative for arthralgias, back pain, joint swelling and myalgias.   Neurological:  Negative for dizziness, syncope, facial asymmetry, light-headedness, numbness and headaches.   Psychiatric/Behavioral:  Negative for confusion and decreased concentration. The patient is not nervous/anxious.        Past Medical and Surgical History:     Past Medical History:   Diagnosis Date    Anemia 08/22/2018    Anxiety     Arthritis     AVB (atrioventricular block)     first degree    Cataract     CHF (congestive heart failure) ([facility])     COPD, mild ([facility])     Coronary artery disease     Dislocation of right shoulder joint     Frequent UTI     GERD (gastroesophageal reflux disease)     H/O: pneumonia     Heme positive stool     Hyperlipidemia     Hypertension     Hypothyroidism     Morbid obesity with BMI of 50.0-59.9, adult ([facility])     Obesity, morbid ([facility]) 08/22/2018    JANICE on CPAP     Pulmonary hypertension ([facility]) 08/22/2018    Severe aortic stenosis     Simple goiter     Skin  cyst     within the armpits, right    Wears glasses        Past Surgical History:   Procedure Laterality Date    BREAST BIOPSY      CARDIAC CATHETERIZATION      CARPAL TUNNEL RELEASE Bilateral     CHOLECYSTECTOMY      DILATION AND CURETTAGE OF UTERUS      HYSTEROSCOPY      MASTOID SURGERY      VA COLONOSCOPY FLX DX W/COLLJ SPEC WHEN PFRMD N/A 9/6/2018    Procedure: COLONOSCOPY;  Surgeon: Shree Sosa III, MD;  Location: [facility];  Service: Gastroenterology    VA ECHO TRANSESOPHAG R-T 2D W/PRB IMG ACQUISJ I&R N/A 10/9/2018    Procedure: INTRA-OP TRANSESOPHAGEAL ECHOCARDIOGRAM (GARRISON);  Surgeon: Kushal Camarena DO;  Location:  MAIN OR;  Service: Cardiac Surgery    VA ESOPHAGOGASTRODUODENOSCOPY TRANSORAL DIAGNOSTIC N/A 8/31/2018    Procedure: ESOPHAGOGASTRODUODENOSCOPY (EGD);  Surgeon: Shree Sosa III, MD;  Location: [facility];  Service: Gastroenterology    VA REPLACE AORTIC VALVE OPENFEMORAL ARTERY APPROACH N/A 10/9/2018    Procedure: REPLACEMENT AORTIC VALVE TRANSCATHETER (TAVR) TRANSFEMORAL W/ 23 MM MENDOZA NOE S3 VALVE (ACCESS OF LEFT);  Surgeon: Kushal Camarena DO;  Location: [facility];  Service: Cardiac Surgery    TOTAL HIP ARTHROPLASTY Left 2007    TOTAL KNEE ARTHROPLASTY Bilateral        Meds/Allergies:    Prior to Admission medications    Medication Sig Start Date End Date Taking? Authorizing Provider   acetaminophen (TYLENOL) 500 mg tablet Take 500 mg by mouth every 6 (six) hours as needed    Historical Provider, MD   aspirin (ECOTRIN LOW STRENGTH) 81 mg EC tablet Take 1 tablet (81 mg total) by mouth daily 10/11/18   Fatou Schmitz PA-C   b complex vitamins capsule Take 1 capsule by mouth 2 (two) times a day      Historical Provider, MD   Blood Glucose Monitoring Suppl (OneTouch Verio Reflect) w/Device KIT Check blood sugars once daily. Please substitute with appropriate alternative as covered by patient's insurance. Dx: E11.65 6/28/24   MABEL Donahue   Calcium  Carb-Cholecalciferol (CALCIUM 600 + D PO) Take 1 tablet by mouth 2 (two) times a day    Historical Provider, MD   Cranberry 250 MG TABS Take by mouth    Historical Provider, MD   ferrous sulfate 324 (65 Fe) mg Take 1 tablet every other day. 5/21/24   MABEL Hallman   furosemide (LASIX) 20 mg tablet Take 1 tablet (20 mg total) by mouth daily as needed (wt gain) 8/18/23   Anjel Villar,    glucose blood (OneTouch Verio) test strip Check blood sugars once daily. Please substitute with appropriate alternative as covered by patient's insurance. Dx: E11.65 6/28/24   MABEL Donahue   glucose blood test strip Use 1 each in the morning Use as instructed 6/28/24   MABEL Donahue   hydrocortisone 2.5 % cream Apply topically 2 (two) times a day 1/10/24   MABEL Donahue   Lancets (onetouch ultrasoft) lancets Use in the morning Use as instructed 6/28/24   MABEL Donahue   levothyroxine 50 mcg tablet TAKE 1 TABLET BY MOUTH EVERY DAY 7/5/24   MABEL Hallman   ofloxacin (OCUFLOX) 0.3 % ophthalmic solution 5 drops twice daily to left ear x 10 days.  Patient not taking: Reported on 7/16/2024 11/17/23   Javier Zaragoza PA-C   olmesartan (BENICAR) 5 mg tablet TAKE 2 TABLETS BY MOUTH EVERY DAY 4/8/24   Bro Esteves MD   Omega-3 Fatty Acids (Fish Oil) 300 MG CAPS Take by mouth    Historical Provider, MD   omeprazole (PriLOSEC) 40 MG capsule TAKE 1 CAPSULE BY MOUTH TWICE A DAY 7/3/24   MABEL Hallman   OneTouch Delica Lancets 33G MISC Check blood sugars once daily. Please substitute with appropriate alternative as covered by patient's insurance. Dx: E11.65 6/28/24   MABEL Donahue   oxybutynin (DITROPAN-XL) 5 mg 24 hr tablet Take 1 tablet (5 mg total) by mouth daily 7/1/24   MABEL Hallman   oxyCODONE (Roxicodone) 5 immediate release tablet Take 0.5 tablets (2.5 mg total) by mouth 2 (two) times a day as needed for moderate pain Max Daily Amount: 5 mg  Patient not taking: Reported on  7/16/2024 4/22/24   Juan Montero MD   oxygen gas Inhale 2 L/min continuous. Indications: copd    Historical Provider, MD   pregabalin (LYRICA) 25 mg capsule Take 1 capsule (25 mg total) by mouth daily at bedtime for 3 days, THEN 1 capsule (25 mg total) 2 (two) times a day for 27 days. 7/16/24 8/15/24  MABEL Oliveira   sertraline (ZOLOFT) 100 mg tablet TAKE 1 TABLET BY MOUTH EVERY DAY 3/26/24   MABEL Hallman   simvastatin (ZOCOR) 40 mg tablet TAKE 1 TABLET BY MOUTH EVERYDAY AT BEDTIME 7/5/24   MABEL Donahue   triamcinolone (KENALOG) 0.1 % cream Apply topically 2 (two) times a day 1/25/24   MABEL Hallman   Turmeric (QC Tumeric Complex) 500 MG CAPS Take by mouth    Historical Provider, MD   warfarin (Coumadin) 2.5 mg tablet Take 1 tablet (2.5mg) Mon-Sat. Take 2 tablets (5mg) on Sun or as directed 6/7/24   Bro Esteves MD   cyclobenzaprine (FLEXERIL) 5 mg tablet Take 1 tablet (5 mg total) by mouth 2 (two) times a day as needed for muscle spasms 8/30/22 9/5/22  MABEL Hallman   meloxicam (Mobic) 15 mg tablet Take 1 tablet (15 mg total) by mouth daily 8/30/22 9/5/22  MABEL Hallman     I have reviewed home medications with patient personally.    Allergies:   Allergies   Allergen Reactions    Latex Rash    Neosporin [Neomycin-Bacitracin Zn-Polymyx] Rash and Other (See Comments)     hives per [facility] order       Social History:     Marital Status: Single   O  Patient Pre-hospital Living Situation: home  Patient Pre-hospital Level of Mobility: independent  Patient Pre-hospital Diet Restrictions: chf  Substance Use History:   Social History     Substance and Sexual Activity   Alcohol Use No     Social History     Tobacco Use   Smoking Status Never   Smokeless Tobacco Never     Social History     Substance and Sexual Activity   Drug Use No       Family History:    Family History   Problem Relation Age of Onset    Diabetes Mother     Stroke Mother     Cancer Father     Lung cancer Father      Diabetes Sister     Heart disease Sister     Hypertension Sister     Coronary artery disease Family     Diabetes Family     Hypertension Family     Cancer Family     Stroke Family     Thyroid disease Neg Hx        Physical Exam:     Vitals:   Blood Pressure: 127/57 (07/29/24 1430)  Pulse: (!) 41 (07/29/24 1430)  Temperature: 97.6 °F (36.4 °C) (07/29/24 1209)  Temp Source: Oral (07/29/24 1209)  Respirations: (!) 24 (07/29/24 1430)  Weight - Scale: 87.6 kg (193 lb 2 oz) (07/29/24 1209)  SpO2: 96 % (07/29/24 1430)    Physical Exam  Constitutional:       General: She is not in acute distress.     Appearance: She is well-developed. She is not diaphoretic.   HENT:      Head: Normocephalic and atraumatic.      Nose: Nose normal.      Mouth/Throat:      Mouth: Oropharynx is clear and moist.      Pharynx: No oropharyngeal exudate.   Eyes:      General: No scleral icterus.        Right eye: No discharge.         Left eye: No discharge.      Extraocular Movements: EOM normal.      Conjunctiva/sclera: Conjunctivae normal.   Neck:      Thyroid: No thyromegaly.      Vascular: No JVD.   Cardiovascular:      Rate and Rhythm: Normal rate and regular rhythm.      Heart sounds: Normal heart sounds. No murmur heard.     No friction rub. No gallop.   Pulmonary:      Effort: Pulmonary effort is normal. No respiratory distress.      Breath sounds: Normal breath sounds. No wheezing or rales.   Chest:      Chest wall: No tenderness.   Abdominal:      General: Bowel sounds are normal. There is no distension.      Palpations: Abdomen is soft.      Tenderness: There is no abdominal tenderness. There is no guarding or rebound.   Musculoskeletal:         General: No tenderness, deformity or edema. Normal range of motion.      Cervical back: Normal range of motion and neck supple.   Skin:     General: Skin is warm and dry.      Findings: No erythema or rash.   Neurological:      Mental Status: She is alert. Mental status is at baseline.       Cranial Nerves: No cranial nerve deficit.      Sensory: No sensory deficit.      Motor: No abnormal muscle tone.      Coordination: Coordination normal.   Psychiatric:         Mood and Affect: Mood and affect normal.             Additional Data:     Lab Results: I have personally reviewed pertinent reports.      Results from last 7 days   Lab Units 07/29/24  1242   WBC Thousand/uL 10.94*   HEMOGLOBIN g/dL 10.3*   HEMATOCRIT % 34.2*   PLATELETS Thousands/uL 459*   SEGS PCT % 70   LYMPHO PCT % 15   MONO PCT % 7   EOS PCT % 5     Results from last 7 days   Lab Units 07/29/24  1242   SODIUM mmol/L 138   POTASSIUM mmol/L 4.5   CHLORIDE mmol/L 108   CO2 mmol/L 23   BUN mg/dL 31*   CREATININE mg/dL 0.90   ANION GAP mmol/L 7   CALCIUM mg/dL 9.4   ALBUMIN g/dL 2.9*   TOTAL BILIRUBIN mg/dL 0.24   ALK PHOS U/L 95   ALT U/L 28   AST U/L 25   GLUCOSE RANDOM mg/dL 136     Results from last 7 days   Lab Units 07/29/24  1330   INR  6.66*                   Imaging: I have personally reviewed pertinent reports.      X-ray chest 2 views   Final Result by Cedric Tavares MD (07/29 1401)      No acute cardiopulmonary disease.            Workstation performed: [id number]             EKG, Pathology, and Other Studies Reviewed on Admission:   EKG: reviewed    [facility] Reviewed: Yes     ** Please Note: This note has been constructed using a voice recognition system. **

## 2024-07-29 NOTE — ASSESSMENT & PLAN NOTE
Heart rate noted to be approximately 45 on arrival  Appears to be sinus bradycardia  Will monitor on telemetry for now

## 2024-07-29 NOTE — LETTER
Date: 8/1/2024    Marisol Otoole  [address]  [place] [address]    Dear Marisol Otoole,      We have attempted to reach you regarding your upcoming appointment on *** with ***.     Unfortunately, due to a change in the provider's schedule we need to change your appointment. Please call our office as soon as possible so we can reschedule your appointment. We apologize for any inconvenience this may cause.     Thank you in advance for your cooperation and assistance.          Sincerely,  Javier Poon MD

## 2024-07-29 NOTE — ASSESSMENT & PLAN NOTE
Presented with volume overload on exam peripheral edema  Reports compliance with oral diuretics  Denies any shortness of breath  BNP noted to be 454  Will place on IV Lasix twice daily  Echo in 2022 revealed normal EF  Will repeat echo

## 2024-07-29 NOTE — TELEPHONE ENCOUNTER
Post-Anesthesia Evaluation and Assessment    Patient: Amanda Zavala MRN: [id number]  SSN: [id number]    YOB: 1980  Age: 44 y.o. Sex: female       Cardiovascular Function/Vital Signs  Visit Vitals    /85 (BP 1 Location: Left arm, BP Patient Position: At rest)    Pulse 96    Temp 36.6 °C (97.9 °F)    Resp 16    Ht 5' 6\" (1.676 m)    Wt 82.6 kg (182 lb)    SpO2 98%    Breastfeeding No    BMI 29.38 kg/m2       Patient is status post epidural anesthesia for * No procedures listed *. Nausea/Vomiting: None    Postoperative hydration reviewed and adequate. Pain:  Pain Scale 1: Labor Algorithm/Pain Intensity (03/08/18 2230)  Pain Intensity 1: 0 (03/08/18 1839)   Managed    Neurological Status:   Neuro (WDL): Within Defined Limits (03/08/18 1952)   At baseline    Mental Status and Level of Consciousness: Arousable    Pulmonary Status:   O2 Device: Room air (03/08/18 2209)   Adequate oxygenation and airway patent    Complications related to anesthesia: None    Post-anesthesia assessment completed.  No concerns    Signed By: Gulshan Becerra MD     March 8, 2018 Spoke to pt and relayed Dr. Esteves's response, pt stated she is on her way to [facility].

## 2024-07-30 ENCOUNTER — APPOINTMENT (INPATIENT)
Dept: NON INVASIVE DIAGNOSTICS | Facility: HOSPITAL | Age: 84
DRG: 291 | End: 2024-07-30
Payer: MEDICARE

## 2024-07-30 LAB
ANION GAP SERPL CALCULATED.3IONS-SCNC: 6 MMOL/L (ref 4–13)
AORTIC ROOT: 2.2 CM
AORTIC VALVE MEAN VELOCITY: 17.3 M/S
APICAL FOUR CHAMBER EJECTION FRACTION: 71 %
ASCENDING AORTA: 2.7 CM
ATRIAL RATE: 44 BPM
ATRIAL RATE: 47 BPM
AV AREA BY CONTINUOUS VTI: 1.9 CM2
AV AREA PEAK VELOCITY: 1.6 CM2
AV LVOT MEAN GRADIENT: 4 MMHG
AV LVOT PEAK GRADIENT: 6 MMHG
AV MEAN GRADIENT: 14 MMHG
AV PEAK GRADIENT: 28 MMHG
AV VALVE AREA: 1.92 CM2
AV VELOCITY RATIO: 0.46
BASOPHILS # BLD AUTO: 0.04 THOUSANDS/ÂΜL (ref 0–0.1)
BASOPHILS NFR BLD AUTO: 0 % (ref 0–1)
BSA FOR ECHO PROCEDURE: 1.66 M2
BUN SERPL-MCNC: 26 MG/DL (ref 5–25)
CALCIUM SERPL-MCNC: 9.6 MG/DL (ref 8.4–10.2)
CHLORIDE SERPL-SCNC: 108 MMOL/L (ref 96–108)
CO2 SERPL-SCNC: 25 MMOL/L (ref 21–32)
CREAT SERPL-MCNC: 0.73 MG/DL (ref 0.6–1.3)
DOP CALC AO PEAK VEL: 2.67 M/S
DOP CALC AO VTI: 61.09 CM
DOP CALC LVOT AREA: 3.46 CM2
DOP CALC LVOT CARDIAC INDEX: 2.94 L/MIN/M2
DOP CALC LVOT CARDIAC OUTPUT: 4.88 L/MIN
DOP CALC LVOT DIAMETER: 2.1 CM
DOP CALC LVOT PEAK VEL VTI: 33.93 CM
DOP CALC LVOT PEAK VEL: 1.23 M/S
DOP CALC LVOT STROKE INDEX: 68.1 ML/M2
DOP CALC LVOT STROKE VOLUME: 117.46
DOP CALC MV VTI: 58.75 CM
E WAVE DECELERATION TIME: 716 MS
E/A RATIO: 0.82
EOSINOPHIL # BLD AUTO: 0.59 THOUSAND/ÂΜL (ref 0–0.61)
EOSINOPHIL NFR BLD AUTO: 5 % (ref 0–6)
ERYTHROCYTE [DISTWIDTH] IN BLOOD BY AUTOMATED COUNT: 14.6 % (ref 11.6–15.1)
FRACTIONAL SHORTENING: 56 (ref 28–44)
GFR SERPL CREATININE-BSD FRML MDRD: 75 ML/MIN/1.73SQ M
GLUCOSE SERPL-MCNC: 93 MG/DL (ref 65–140)
HCT VFR BLD AUTO: 34.6 % (ref 34.8–46.1)
HGB BLD-MCNC: 10.4 G/DL (ref 11.5–15.4)
IMM GRANULOCYTES # BLD AUTO: 0.16 THOUSAND/UL (ref 0–0.2)
IMM GRANULOCYTES NFR BLD AUTO: 1 % (ref 0–2)
INR PPP: 5.47 (ref 0.84–1.19)
INTERVENTRICULAR SEPTUM IN DIASTOLE (PARASTERNAL SHORT AXIS VIEW): 1 CM
INTERVENTRICULAR SEPTUM: 1 CM (ref 0.6–1.1)
LAAS-AP2: 24.5 CM2
LAAS-AP4: 27.4 CM2
LEFT ATRIUM AREA SYSTOLE SINGLE PLANE A4C: 25.3 CM2
LEFT ATRIUM SIZE: 4.5 CM
LEFT ATRIUM VOLUME (MOD BIPLANE): 89 ML
LEFT ATRIUM VOLUME INDEX (MOD BIPLANE): 53.6 ML/M2
LEFT INTERNAL DIMENSION IN SYSTOLE: 1.9 CM (ref 2.1–4)
LEFT VENTRICULAR INTERNAL DIMENSION IN DIASTOLE: 4.3 CM (ref 3.5–6)
LEFT VENTRICULAR POSTERIOR WALL IN END DIASTOLE: 0.9 CM
LEFT VENTRICULAR STROKE VOLUME: 73 ML
LVSV (TEICH): 73 ML
LYMPHOCYTES # BLD AUTO: 2.04 THOUSANDS/ÂΜL (ref 0.6–4.47)
LYMPHOCYTES NFR BLD AUTO: 18 % (ref 14–44)
MAGNESIUM SERPL-MCNC: 1.6 MG/DL (ref 1.9–2.7)
MCH RBC QN AUTO: 26.5 PG (ref 26.8–34.3)
MCHC RBC AUTO-ENTMCNC: 30.1 G/DL (ref 31.4–37.4)
MCV RBC AUTO: 88 FL (ref 82–98)
MONOCYTES # BLD AUTO: 0.82 THOUSAND/ÂΜL (ref 0.17–1.22)
MONOCYTES NFR BLD AUTO: 7 % (ref 4–12)
MV E'TISSUE VEL-LAT: 7 CM/S
MV E'TISSUE VEL-SEP: 5 CM/S
MV MEAN GRADIENT: 4 MMHG
MV PEAK A VEL: 1.74 M/S
MV PEAK E VEL: 143 CM/S
MV PEAK GRADIENT: 9 MMHG
MV STENOSIS PRESSURE HALF TIME: 208 MS
MV VALVE AREA BY CONTINUITY EQUATION: 2 CM2
MV VALVE AREA P 1/2 METHOD: 1.06
NEUTROPHILS # BLD AUTO: 7.72 THOUSANDS/ÂΜL (ref 1.85–7.62)
NEUTS SEG NFR BLD AUTO: 69 % (ref 43–75)
NRBC BLD AUTO-RTO: 0 /100 WBCS
P AXIS: 41 DEGREES
P AXIS: 44 DEGREES
PLATELET # BLD AUTO: 456 THOUSANDS/UL (ref 149–390)
PMV BLD AUTO: 9.4 FL (ref 8.9–12.7)
POTASSIUM SERPL-SCNC: 4.7 MMOL/L (ref 3.5–5.3)
PR INTERVAL: 212 MS
PR INTERVAL: 218 MS
PROTHROMBIN TIME: 51 SECONDS (ref 11.6–14.5)
QRS AXIS: -27 DEGREES
QRS AXIS: -35 DEGREES
QRSD INTERVAL: 84 MS
QRSD INTERVAL: 84 MS
QT INTERVAL: 460 MS
QT INTERVAL: 478 MS
QTC INTERVAL: 407 MS
QTC INTERVAL: 408 MS
RA PRESSURE ESTIMATED: 8 MMHG
RBC # BLD AUTO: 3.93 MILLION/UL (ref 3.81–5.12)
RIGHT ATRIUM AREA SYSTOLE A4C: 20.1 CM2
RIGHT VENTRICLE ID DIMENSION: 4.2 CM
RV PSP: 54 MMHG
SL CV LEFT ATRIUM LENGTH A2C: 6.1 CM
SL CV LV EF: 65
SL CV PED ECHO LEFT VENTRICLE DIASTOLIC VOLUME (MOD BIPLANE) 2D: 85 ML
SL CV PED ECHO LEFT VENTRICLE SYSTOLIC VOLUME (MOD BIPLANE) 2D: 12 ML
SODIUM SERPL-SCNC: 139 MMOL/L (ref 135–147)
T WAVE AXIS: 36 DEGREES
T WAVE AXIS: 45 DEGREES
TR MAX PG: 46 MMHG
TR PEAK VELOCITY: 3.4 M/S
TRICUSPID ANNULAR PLANE SYSTOLIC EXCURSION: 3 CM
TRICUSPID VALVE PEAK REGURGITATION VELOCITY: 3.4 M/S
VENTRICULAR RATE: 44 BPM
VENTRICULAR RATE: 47 BPM
WBC # BLD AUTO: 11.37 THOUSAND/UL (ref 4.31–10.16)

## 2024-07-30 PROCEDURE — 93306 TTE W/DOPPLER COMPLETE: CPT | Performed by: INTERNAL MEDICINE

## 2024-07-30 PROCEDURE — 83735 ASSAY OF MAGNESIUM: CPT | Performed by: INTERNAL MEDICINE

## 2024-07-30 PROCEDURE — 85610 PROTHROMBIN TIME: CPT | Performed by: NURSE PRACTITIONER

## 2024-07-30 PROCEDURE — 93010 ELECTROCARDIOGRAM REPORT: CPT | Performed by: INTERNAL MEDICINE

## 2024-07-30 PROCEDURE — 80048 BASIC METABOLIC PNL TOTAL CA: CPT | Performed by: INTERNAL MEDICINE

## 2024-07-30 PROCEDURE — 85025 COMPLETE CBC W/AUTO DIFF WBC: CPT | Performed by: INTERNAL MEDICINE

## 2024-07-30 PROCEDURE — 97110 THERAPEUTIC EXERCISES: CPT

## 2024-07-30 PROCEDURE — 93306 TTE W/DOPPLER COMPLETE: CPT

## 2024-07-30 PROCEDURE — 99232 SBSQ HOSP IP/OBS MODERATE 35: CPT | Performed by: NURSE PRACTITIONER

## 2024-07-30 PROCEDURE — 97163 PT EVAL HIGH COMPLEX 45 MIN: CPT

## 2024-07-30 PROCEDURE — 97167 OT EVAL HIGH COMPLEX 60 MIN: CPT

## 2024-07-30 RX ORDER — MAGNESIUM SULFATE HEPTAHYDRATE 40 MG/ML
4 INJECTION, SOLUTION INTRAVENOUS ONCE
Status: COMPLETED | OUTPATIENT
Start: 2024-07-30 | End: 2024-07-30

## 2024-07-30 RX ADMIN — ACETAMINOPHEN 650 MG: 325 TABLET, FILM COATED ORAL at 23:41

## 2024-07-30 RX ADMIN — FUROSEMIDE 40 MG: 10 INJECTION, SOLUTION INTRAMUSCULAR; INTRAVENOUS at 09:41

## 2024-07-30 RX ADMIN — PRAVASTATIN SODIUM 80 MG: 80 TABLET ORAL at 17:11

## 2024-07-30 RX ADMIN — ACETAMINOPHEN 650 MG: 325 TABLET, FILM COATED ORAL at 13:46

## 2024-07-30 RX ADMIN — MAGNESIUM SULFATE HEPTAHYDRATE 4 G: 40 INJECTION, SOLUTION INTRAVENOUS at 11:43

## 2024-07-30 RX ADMIN — LEVOTHYROXINE SODIUM 50 MCG: 0.05 TABLET ORAL at 05:30

## 2024-07-30 RX ADMIN — ACETAMINOPHEN 650 MG: 325 TABLET, FILM COATED ORAL at 03:45

## 2024-07-30 RX ADMIN — FUROSEMIDE 40 MG: 10 INJECTION, SOLUTION INTRAMUSCULAR; INTRAVENOUS at 17:11

## 2024-07-30 RX ADMIN — OXYBUTYNIN 5 MG: 5 TABLET, FILM COATED, EXTENDED RELEASE ORAL at 09:42

## 2024-07-30 RX ADMIN — ASPIRIN 81 MG: 81 TABLET, COATED ORAL at 09:42

## 2024-07-30 RX ADMIN — PANTOPRAZOLE SODIUM 40 MG: 40 TABLET, DELAYED RELEASE ORAL at 05:30

## 2024-07-30 RX ADMIN — SERTRALINE 100 MG: 100 TABLET, FILM COATED ORAL at 09:42

## 2024-07-30 NOTE — ASSESSMENT & PLAN NOTE
Present on admission history of chronic diastolic heart failure.  Patient is on Lasix 40 mg daily as needed at home.  Hospitalized due to volume overload and did well with IV Lasix 40 mg twice daily.    Currently appears comfortable on discharge.  Appears euvolemic.  O2 saturation 94% on room air.  Patient does have chronic home O2 at home however she uses as needed.  Transition to p.o. Lasix 20 mg twice daily.  Plan for discharge tomorrow.

## 2024-07-30 NOTE — ASSESSMENT & PLAN NOTE
Presented with volume overload on exam peripheral edema  Reports compliance with oral diuretics  She is only on PRN dosing  Does report a diet high in junk food  Dietary education provided  Lower extremity swelling ins improving  Is Compliant with daily weights at home  BNP noted to be 454  Will place on IV Lasix twice daily  Echo in 2022 revealed normal EF  Will repeat echo

## 2024-07-30 NOTE — PLAN OF CARE
Problem: PAIN - ADULT  Goal: Verbalizes/displays adequate comfort level or baseline comfort level  Description: Interventions:  - Encourage patient to monitor pain and request assistance  - Assess pain using appropriate pain scale  - Administer analgesics based on type and severity of pain and evaluate response  - Implement non-pharmacological measures as appropriate and evaluate response  - Consider cultural and social influences on pain and pain management  - Notify physician/advanced practitioner if interventions unsuccessful or patient reports new pain  Outcome: Progressing     Problem: INFECTION - ADULT  Goal: Absence or prevention of progression during hospitalization  Description: INTERVENTIONS:  - Assess and monitor for signs and symptoms of infection  - Monitor lab/diagnostic results  - Monitor all insertion sites, i.e. indwelling lines, tubes, and drains  - Monitor endotracheal if appropriate and nasal secretions for changes in amount and color  - [place] appropriate cooling/warming therapies per order  - Administer medications as ordered  - Instruct and encourage patient and family to use good hand hygiene technique  - Identify and instruct in appropriate isolation precautions for identified infection/condition  Outcome: Progressing  Goal: Absence of fever/infection during neutropenic period  Description: INTERVENTIONS:  - Monitor WBC    Outcome: Progressing     Problem: SAFETY ADULT  Goal: Patient will remain free of falls  Description: INTERVENTIONS:  - Educate patient/family on patient safety including physical limitations  - Instruct patient to call for assistance with activity   - Consult OT/PT to assist with strengthening/mobility   - Keep Call bell within reach  - Keep bed low and locked with side rails adjusted as appropriate  - Keep care items and personal belongings within reach  - Initiate and maintain comfort rounds  - Make Fall Risk Sign visible to staff  - Offer Toileting every 2 Hours,  in advance of need  - Initiate/Maintain bed alarm  - Obtain necessary fall risk management equipment: walker  - Apply yellow socks and bracelet for high fall risk patients  - Consider moving patient to room near nurses station  Outcome: Progressing  Goal: Maintain or return to baseline ADL function  Description: INTERVENTIONS:  -  Assess patient's ability to carry out ADLs; assess patient's baseline for ADL function and identify physical deficits which impact ability to perform ADLs (bathing, care of mouth/teeth, toileting, grooming, dressing, etc.)  - Assess/evaluate cause of self-care deficits   - Assess range of motion  - Assess patient's mobility; develop plan if impaired  - Assess patient's need for assistive devices and provide as appropriate  - Encourage maximum independence but intervene and supervise when necessary  - Involve family in performance of ADLs  - Assess for home care needs following discharge   - Consider OT consult to assist with ADL evaluation and planning for discharge  - Provide patient education as appropriate  Outcome: Progressing  Goal: Maintains/Returns to pre admission functional level  Description: INTERVENTIONS:  - Perform AM-PAC 6 Click Basic Mobility/ Daily Activity assessment daily.  - Set and communicate daily mobility goal to care team and patient/family/caregiver.   - Collaborate with rehabilitation services on mobility goals if consulted  - Perform Range of Motion 2 times a day.  - Reposition patient every 2 hours.  - Dangle patient 2 times a day  - Stand patient 2 times a day  - Ambulate patient 2 times a day  - Out of bed to chair 2 times a day   - Out of bed for meals 3 times a day  - Out of bed for toileting  - Record patient progress and toleration of activity level   Outcome: Progressing

## 2024-07-30 NOTE — CASE MANAGEMENT
Case Management Assessment & Discharge Planning Note    Patient name Marisol Otoole  Location /-01 MRN [id number]  : 1940 Date 2024       Current Admission Date: 2024  Current Admission Diagnosis:Acute on chronic diastolic congestive heart failure (HCC)   Patient Active Problem List    Diagnosis Date Noted Date Diagnosed    Bradycardia 2024     Chronic anticoagulation 2024     Urinary frequency 2024     At risk for injury related to fall 2024     Walker as ambulation aid 2024     Ambulatory dysfunction 10/24/2023     Fall 2023     Orbital mass 2023     Stage 3a chronic kidney disease (HCC) 2022     Radiculopathy, lumbar region 07/10/2021     Chronic hypoxemic respiratory failure (HCC) 06/10/2021     Depression, recurrent (HCC) 2021     Osteopenia 10/22/2020     Insomnia 2020     Depression with anxiety 2019     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/2018     Primary osteoarthritis of left shoulder      AVB (atrioventricular block)      Acute on chronic diastolic congestive heart failure (HCC)      COPD, mild (HCC)      Coronary artery disease involving native coronary artery of native heart without angina pectoris      Gastroesophageal reflux disease without esophagitis 2018     Iron deficiency anemia secondary to inadequate dietary iron intake 2018     Obesity, morbid (HCC) 2018     Acute diastolic congestive heart failure (HCC) 2018     JANICE (obstructive sleep apnea) 2018     Hyperlipidemia 2017     Primary hypertension 2017     Acquired hypothyroidism 10/29/2017     LVH (left ventricular hypertrophy) 2016     Mitral annular calcification 2016     Mitral valve stenosis 2016     Pulmonary hypertension (HCC) 2016       LOS (days): 1  Geometric Mean LOS (GMLOS) (days): 3.9  Days to GMLOS:3.1     OBJECTIVE:    Risk of Unplanned Readmission  Score: 20.08         Current admission status: Inpatient       Preferred Pharmacy:   [facility] [place] - [address]  [address]  [place] [address]  Phone: [phone] Fax: [phone]    Primary Care Provider: MABEL Hallman    Primary Insurance: MEDICARE  Secondary Insurance:     ASSESSMENT:  Active Health Care Proxies       Yuridia Hurtado Health Care Agent - Friend   Primary Phone: [phone] (Mobile)                 Advance Directives  Does patient have a Health Care POA?: Yes  Does patient have Advance Directives?: Yes  Advance Directives: Power of  for health care  Primary Contact: Yuridia Hurtado   Health Care Agent - Friend  Primary Phone: [phone]         Readmission Root Cause  30 Day Readmission: No    Patient Information  Admitted from:: Home  Mental Status: Alert  During Assessment patient was accompanied by: Not accompanied during assessment  Assessment information provided by:: Patient  Primary Caregiver: Self  Support Systems: Self, Friend  County of Residence: [place]  What [place] do you [place] in[place]  Home entry access options. Select all that apply.: No steps to enter home  Type of Current Residence: 2 story home  Upon entering residence, is there a bedroom on the main floor (no further steps)?: Yes  Upon entering residence, is there a bathroom on the main floor (no further steps)?: Yes  Living Arrangements: Lives w/ Friend  Is patient a ?: No    Activities of Daily Living Prior to Admission  Functional Status: Independent  Completes ADLs independently?: Yes  Ambulates independently?: Yes  Does patient use assisted devices?: No  Does patient currently own DME?: Yes  What DME does the patient currently own?: Home Oxygen concentrator, Walker, Shower Chair  Does patient have a history of Outpatient Therapy (PT/OT)?: No  Does the patient have a history of Short-Term Rehab?: No  Does patient have a history of HHC?: Yes  (Residential)  Does patient currently have C?: No         Patient Information Continued  Income Source: SSI/SSD  Does patient have prescription coverage?: Yes  Does patient receive dialysis treatments?: No  Does patient have a history of substance abuse?: No         Means of Transportation  Means of Transport to Appts:: [employer]ga [employer]      Social Determinants of Health (SDOH)      Flowsheet Row Most Recent Value   Housing Stability    In the last 12 months, was there a time when you were not able to pay the mortgage or rent on time? N   In the past 12 months, how many times have you moved where you were living? 0   At any time in the past 12 months, were you homeless or living in a shelter (including now)? N   Transportation Needs    In the past 12 months, has lack of transportation kept you from medical appointments or from getting medications? no   In the past 12 months, has lack of transportation kept you from meetings, work, or from getting things needed for daily living? No   Food Insecurity    Within the past 12 months, you worried that your food would run out before you got the money to buy more. Never true   Within the past 12 months, the food you bought just didn't last and you didn't have money to get more. Never true   Utilities    In the past 12 months has the electric, gas, oil, or water company threatened to shut off services in your home? No            DISCHARGE DETAILS:    Discharge planning discussed with:: Pt at bedside  [place] of Choice: Yes  Comments - Freedom of Choice: CM met with pt at bedside and introduced self/role. Pt is alert and oriented x3 able to make her needs known and encouraged to do so. FOC discussed at length. Pt has an AMPAC score of 14, blanket referral made for VNA. CHF virtual scale program reviewed with pt at length however pt does not have a smart, not tech honorio or has internet at home. Pt not a candidate for the program at this time.  CM contacted family/caregiver?: No-  see comments (Pt is alert and oriented x3)  Were Treatment Team discharge recommendations reviewed with patient/caregiver?: Yes  Did patient/caregiver verbalize understanding of patient care needs?: Yes  Were patient/caregiver advised of the risks associated with not following Treatment Team discharge recommendations?: Yes    Contacts  Reason/Outcome: Continuity of Care, Emergency Contact, Referral, Discharge Planning    Requested Home Health Care         Is the patient interested in HHC at discharge?: Yes  Home Health Discipline requested:: Nursing, Occupational Therapy, Physical Therapy    DME Referral Provided  Referral made for DME?: No    Other Referral/Resources/Interventions Provided:  Interventions: HHC    Would you like to participate in our [facility] service program?  : No - Declined    Treatment Team Recommendation: Home with Home Health Care  Discharge Destination Plan:: Home with Home Health Care  Transport at Discharge : Family

## 2024-07-30 NOTE — PLAN OF CARE
Problem: PAIN - ADULT  Goal: Verbalizes/displays adequate comfort level or baseline comfort level  Description: Interventions:  - Encourage patient to monitor pain and request assistance  - Assess pain using appropriate pain scale  - Administer analgesics based on type and severity of pain and evaluate response  - Implement non-pharmacological measures as appropriate and evaluate response  - Consider cultural and social influences on pain and pain management  - Notify physician/advanced practitioner if interventions unsuccessful or patient reports new pain  Outcome: Progressing     Problem: INFECTION - ADULT  Goal: Absence or prevention of progression during hospitalization  Description: INTERVENTIONS:  - Assess and monitor for signs and symptoms of infection  - Monitor lab/diagnostic results  - Monitor all insertion sites, i.e. indwelling lines, tubes, and drains  - Monitor endotracheal if appropriate and nasal secretions for changes in amount and color  - [place] appropriate cooling/warming therapies per order  - Administer medications as ordered  - Instruct and encourage patient and family to use good hand hygiene technique  - Identify and instruct in appropriate isolation precautions for identified infection/condition  Outcome: Progressing  Goal: Absence of fever/infection during neutropenic period  Description: INTERVENTIONS:  - Monitor WBC    Outcome: Progressing     Problem: SAFETY ADULT  Goal: Patient will remain free of falls  Description: INTERVENTIONS:  - Educate patient/family on patient safety including physical limitations  - Instruct patient to call for assistance with activity   - Consult OT/PT to assist with strengthening/mobility   - Keep Call bell within reach  - Keep bed low and locked with side rails adjusted as appropriate  - Keep care items and personal belongings within reach  - Initiate and maintain comfort rounds  - Make Fall Risk Sign visible to staff  - Offer Toileting every 2 Hours,  in advance of need  - Initiate/Maintain bed alarm  - Obtain necessary fall risk management equipment: call bell within reach  - Apply yellow socks and bracelet for high fall risk patients  - Consider moving patient to room near nurses station  Outcome: Progressing  Goal: Maintain or return to baseline ADL function  Description: INTERVENTIONS:  -  Assess patient's ability to carry out ADLs; assess patient's baseline for ADL function and identify physical deficits which impact ability to perform ADLs (bathing, care of mouth/teeth, toileting, grooming, dressing, etc.)  - Assess/evaluate cause of self-care deficits   - Assess range of motion  - Assess patient's mobility; develop plan if impaired  - Assess patient's need for assistive devices and provide as appropriate  - Encourage maximum independence but intervene and supervise when necessary  - Involve family in performance of ADLs  - Assess for home care needs following discharge   - Consider OT consult to assist with ADL evaluation and planning for discharge  - Provide patient education as appropriate  Outcome: Progressing  Goal: Maintains/Returns to pre admission functional level  Description: INTERVENTIONS:  - Perform AM-PAC 6 Click Basic Mobility/ Daily Activity assessment daily.  - Set and communicate daily mobility goal to care team and patient/family/caregiver.   - Collaborate with rehabilitation services on mobility goals if consulted  - Perform Range of Motion 3 times a day.  - Reposition patient every 2 hours.  - Dangle patient 3 times a day  - Stand patient 3 times a day  - Ambulate patient 3 times a day  - Out of bed to chair 3 times a day   - Out of bed for meals 3 times a day  - Out of bed for toileting  - Record patient progress and toleration of activity level   Outcome: Progressing

## 2024-07-30 NOTE — ASSESSMENT & PLAN NOTE
Patient on Coumadin presented with INR 6.6  Hold home Coumadin  Will Consider one dose of vitamin K pending repeat INR  Currently no signs of overt bleeding     Lab Results   Component Value Date    INR 5.47 () 07/30/2024    INR 6.66 () 07/29/2024

## 2024-07-30 NOTE — PLAN OF CARE
Problem: OCCUPATIONAL THERAPY ADULT  Goal: Performs self-care activities at highest level of function for planned discharge setting.  See evaluation for individualized goals.  Description: Treatment Interventions: ADL retraining, Functional transfer training, UE strengthening/ROM, Endurance training, Patient/family training, Equipment evaluation/education, Compensatory technique education, Continued evaluation, Energy conservation, Activityengagement          See flowsheet documentation for full assessment, interventions and recommendations.   Note: Limitation: Decreased ADL status, Decreased UE strength, Decreased Safe judgement during ADL, Decreased endurance, Decreased self-care trans, Decreased high-level ADLs  Prognosis: Good  Assessment: Patient is a 83 y.o. female seen for OT evaluation s/p admit to [facility] on [facility]/29/2024 w/Acute on chronic diastolic congestive heart failure (HCC). Commorbidities affecting patient's functional performance at time of assessment include:  ambulatory dysfunction, bradycardia, chronic anticoagulation, stage 3a CKD, and radiculopathy of lumbar region.  Orders placed for OT evaluation and treatment.  Performed at least two patient identifiers during session including name and wristband.  Prior to admission, Patient was independnet with ADLs/ required assistance with IADLs. Patient lives in a one story house with ramped entrance, with a friend. Patient was ambulating with a RW.  Personal factors affecting patient at time of initial evaluation include: limited caregiver support, limited insight into deficits, decreased initiation and engagement, difficulty performing ADLs, and difficulty performing IADLs. Upon evaluation, patient requires minimal  assist for UB ADLs, moderate and maximal assist for LB ADLs, transfers and functional ambulation in room and bathroom with moderate assist, with the use of Rolling Walker.   Occupational performance is affected by the  following deficits: decreased functional use of BUEs, limited active ROM, decreased muscle strength, degenerative arthritic joint changes, impaired gross motor coordination, dynamic sit/ stand balance deficit with poor standing tolerance time for self care and functional mobility, decreased activity tolerance, decreased safety awareness, and postural control and postural alignment deficit, requiring external assistance to complete transitional movements.  Patient to benefit from continued Occupational Therapy treatment while in the hospital to address deficits as defined above and maximize level of functional independence with ADLs and functional mobility. Occupational Performance areas to address include: bathing/ shower, dressing, toilet hygiene, transfer to all surfaces, functional ambulation, functional mobility, emergency response, health maintenance, Leisure Participation, and Social participation. From OT standpoint, recommendation at time of d/c would be Level 2.     Rehab Resource Intensity Level, OT: II (Moderate Resource Intensity)

## 2024-07-30 NOTE — DISCHARGE INSTR - OTHER ORDERS
Skin care plans:  1-Left abdominal pannus- Cleanse wound with soap and water, pat dry. Apply Melgisorb rope over wound bed. Change daily and as needed for soilage/displacement.

## 2024-07-30 NOTE — PHYSICAL THERAPY NOTE
Physical Therapy Evaluation     Patient's Name: Marisol Otoole    Admitting Diagnosis  Bradycardia [R00.1]  Weakness [R53.1]  Chronic anticoagulation [Z79.01]  Supratherapeutic INR [R79.1]  Generalized weakness [R53.1]    Problem List  Patient Active Problem List   Diagnosis    Acquired hypothyroidism    Primary hypertension    Hyperlipidemia    JANICE (obstructive sleep apnea)    LVH (left ventricular hypertrophy)    Mitral annular calcification    Mitral valve stenosis    Pulmonary hypertension (HCC)    Acute diastolic congestive heart failure (HCC)    Iron deficiency anemia secondary to inadequate dietary iron intake    Obesity, morbid ([facility])    Gastroesophageal reflux disease without esophagitis    Primary osteoarthritis of left shoulder    AVB (atrioventricular block)    Acute on chronic diastolic congestive heart failure (HCC)    COPD, mild (HCC)    Coronary artery disease involving native coronary artery of native heart without angina pectoris    S/P TAVR (transcatheter aortic valve replacement)    Depression with anxiety    Insomnia    Osteopenia    Depression, recurrent (HCC)    Chronic hypoxemic respiratory failure ([facility])    Radiculopathy, lumbar region    Stage 3a chronic kidney disease (HCC)    Orbital mass    Fall    Ambulatory dysfunction    At risk for injury related to fall    Walker as ambulation aid    Urinary frequency    Bradycardia    Chronic anticoagulation     Past Medical History  Past Medical History:   Diagnosis Date    Anemia 08/22/2018    Anxiety     Arthritis     AVB (atrioventricular block)     first degree    Cataract     CHF (congestive heart failure) (HCC)     COPD, mild (HCC)     Coronary artery disease     Dislocation of right shoulder joint     Frequent UTI     GERD (gastroesophageal reflux disease)     H/O: pneumonia     Heme positive stool     Hyperlipidemia     Hypertension     Hypothyroidism     Morbid obesity with BMI of 50.0-59.9, adult ([facility])     Obesity, morbid ([facility])  08/22/2018    JANICE on CPAP     Pulmonary hypertension (HCC) 08/22/2018    Severe aortic stenosis     Simple goiter     Skin cyst     within the armpits, right    Wears glasses      Past Surgical History  Past Surgical History:   Procedure Laterality Date    BREAST BIOPSY      CARDIAC CATHETERIZATION      CARPAL TUNNEL RELEASE Bilateral     CHOLECYSTECTOMY      DILATION AND CURETTAGE OF UTERUS      HYSTEROSCOPY      MASTOID SURGERY      WY COLONOSCOPY FLX DX W/COLLJ SPEC WHEN PFRMD N/A 9/6/2018    Procedure: COLONOSCOPY;  Surgeon: Shree Sosa III, MD;  Location: [facility];  Service: Gastroenterology    WY ECHO TRANSESOPHAG R-T 2D W/PRB IMG ACQUISJ I&R N/A 10/9/2018    Procedure: INTRA-OP TRANSESOPHAGEAL ECHOCARDIOGRAM (GARRISON);  Surgeon: Kushal Camarena DO;  Location: [facility];  Service: Cardiac Surgery    WY ESOPHAGOGASTRODUODENOSCOPY TRANSORAL DIAGNOSTIC N/A 8/31/2018    Procedure: ESOPHAGOGASTRODUODENOSCOPY (EGD);  Surgeon: Shree Sosa III, MD;  Location: [facility];  Service: Gastroenterology    WY REPLACE AORTIC VALVE OPENFEMORAL ARTERY APPROACH N/A 10/9/2018    Procedure: REPLACEMENT AORTIC VALVE TRANSCATHETER (TAVR) TRANSFEMORAL W/ 23 MM MENDOZA NOE S3 VALVE (ACCESS OF LEFT);  Surgeon: Kushal Camarena DO;  [facility];  Service: Cardiac Surgery    TOTAL HIP ARTHROPLASTY Left 2007    TOTAL KNEE ARTHROPLASTY Bilateral       07/30/24 0835   PT Last Visit   PT Visit Date 07/30/24   Note Type   Note type Evaluation and Treatment   Pain Assessment   Pain Assessment Tool 0-10   Pain Score 6   Pain Location/Orientation Orientation: Lower;Location: Back   Pain Onset/Description Onset: Ongoing   Hospital Pain Intervention(s) Repositioned;Ambulation/increased activity   Restrictions/Precautions   Weight Bearing Precautions Per Order No   Braces or Orthoses Other (Comment)  (none per patient)   Other Precautions Chair Alarm;Bed Alarm;Multiple lines;O2;Fall Risk;Pain  (+1L O2 via NC)   Home Living  "  Type of Home House   Home Layout One level;Able to live on main level with bedroom/bathroom;Performs ADLs on one level  (No REGINO)   Bathroom Shower/Tub Walk-in shower   Bathroom Toilet Raised   Bathroom Equipment Grab bars in shower;Shower chair   Bathroom Accessibility Accessible   Home Equipment Walker;Cane;Other (Comment)  (home O2, 2L PRN)   Additional Comments Pt ambulates with a walker.   Prior Function   Level of Gregory Independent with functional mobility;Independent with ADLs;Independent with IADLS   Lives With Friend(s)   Receives Help From Friend(s);Home health  (home nurse and therapy)   IADLs Family/Friend/Other provides transportation;Independent with meal prep;Independent with medication management  (pt reports difficulty with meal prep)   Falls in the last 6 months 1 to 4  (2 falls)   Vocational Retired   General   Additional Pertinent History NP Lauren cleared patient for PT.   Family/Caregiver Present No   Cognition   Overall Cognitive Status WFL   Arousal/Participation Alert   Orientation Level Oriented X4   Memory Decreased recall of recent events   Following Commands Follows multistep commands without difficulty   Comments Pt agreeable to PT.   Subjective   Subjective \"I haven't been up.\"   RLE Assessment   RLE Assessment X   Strength RLE   RLE Overall Strength 4-/5   LLE Assessment   LLE Assessment X   Strength LLE   LLE Overall Strength 4-/5   Light Touch   RLE Light Touch Grossly intact   LLE Light Touch Grossly intact   Bed Mobility   Supine to Sit 3  Moderate assistance   Additional items Assist x 1;HOB elevated;Bedrails;Increased time required;Verbal cues;LE management   Transfers   Sit to Stand 4  Minimal assistance   Additional items Assist x 1;Increased time required;Verbal cues   Stand to Sit 4  Minimal assistance   Additional items Assist x 1;Armrests;Increased time required;Verbal cues   Ambulation/Elevation   Gait pattern Decreased toe off;Decreased heel strike;Decreased " hip extension;Short stride;Shuffling   Gait Assistance 4  Minimal assist   Additional items Assist x 1;Verbal cues   Assistive Device Rolling walker   Distance 15 feet   Balance   Static Sitting Fair +   Dynamic Sitting Fair   Static Standing Fair -   Dynamic Standing Poor +   Ambulatory Poor +   Endurance Deficit   Endurance Deficit Yes   Endurance Deficit Description decreased activity tolerance; pt requiring supplemental oxygen   Activity Tolerance   Activity Tolerance Patient tolerated treatment well   Medical Staff Made Aware OT Kaylee  (Co-evaluation performed with OT secondary to complex medical condition of patient and regression of functional status from baseline. PT/OT goals were addressed separately.)   Nurse Made Aware JUS Vides   Assessment   Prognosis Good   Problem List Decreased strength;Decreased endurance;Impaired balance;Decreased mobility;Pain   Assessment Pt is 83 year old female seen for PT evaluation s/p admit to [facility] on 7/29/2024 with Acute on chronic diastolic congestive heart failure (HCC). PT consulted to assess pt's functional mobility and discharge needs. Order placed for PT evaluation and treatment, with up as tolerated order. Comorbidities affecting pt's physical performance at time of assessment include ambulatory dysfunction, bradycardia, chronic anticoagulation, stage 3a CKD, and radiculopathy of lumbar region. Prior to hospitalization, pt was independent with all functional mobility with a walker. Pt ambulates household distances. Pt resides with her friend, in a one level house with no steps to enter. Personal factors affecting pt at time of initial evaluation include ambulating with an assistive device, inability to ambulate household distances, inability to navigate level surfaces without external assistance, unable to perform dynamic tasks in the community, limited home support, positive fall history, difficulty performing ADLs, and inability to perform IADLs. Please  find objective findings from PT assessment regarding body systems outlined above with impairments and limitations including weakness, impaired balance, decreased endurance, gait deviations, pain, decreased activity tolerance, decreased functional mobility tolerance, and fall risk. The following objective measures were performed on initial evaluation Barthel Index: 45/100, Modified Ashley: 4 (moderate/severe disability), and AM-PAC 6-Clicks: 16/24. Pt's clinical presentation is currently unstable/unpredictable seen in pt's presentation of need for ongoing medical management/monitoring, pt is a fall risk, and pt requires cues and assist for safety with functional mobility. Pt to benefit from continued PT treatment to address deficits as defined above and maximize pt's level of function and independence with mobility. From a PT standpoint, recommendation at time of discharge would be level 2, moderate resource intensity in order to facilitate return to prior level of function.   Barriers to Discharge Decreased caregiver support   Goals   STG Expiration Date 08/09/24   Short Term Goal #1 In 10 days: Increase bilateral LE strength 1/2 grade to facilitate independent mobility, Perform all bed mobility tasks modified independent to decrease caregiver burden, Perform all transfers modified independent to improve independence, Ambulate > 150 ft. with RW modified independent w/o LOB and w/ normalized gait pattern 100% of the time, and Increase all balance 1/2 grade to decrease risk for falls   PT Treatment Day 1   Plan   Treatment/Interventions Functional transfer training;LE strengthening/ROM;Therapeutic exercise;Endurance training;Patient/family training;Bed mobility;Gait training;Spoke to nursing;OT   PT Frequency 3-5x/wk   Discharge Recommendation   Rehab Resource Intensity Level, PT II (Moderate Resource Intensity)   AM-PAC Basic Mobility Inpatient   Turning in Flat Bed Without Bedrails 3   Lying on Back to Sitting on  Edge of Flat Bed Without Bedrails 2   Moving Bed to Chair 3   Standing Up From Chair Using Arms 3   Walk in Room 3   Climb 3-5 Stairs With Railing 2   Basic Mobility Inpatient Raw Score 16   Basic Mobility Standardized Score 38.32   [facility] Highest Level Of Mobility   [facility] Goal 5: Stand one or more mins   -HL Achieved 6: Walk 10 steps or more   Modified Middleton Scale   Modified Middleton Scale 4   Barthel Index   Feeding 10   Bathing 0   Grooming Score 0   Dressing Score 5   Bladder Score 5   Bowels Score 10   Toilet Use Score 5   Transfers (Bed/Chair) Score 10   Mobility (Level Surface) Score 0   Stairs Score 0   Barthel Index Score 45   Additional Treatment Session   Start Time 0900   End Time 0911   Treatment Assessment Pt agreeable to PT treatment session following PT evaluation. Pt performed seated therapeutic exercise as indicated below. Pt required verbal cues for correct technique and form. Pt tolerated therapeutic exercise well without complaints of pain. Pt continues to exhibit decreased lower extremity strength, impaired balance, decreased endurance, gait deviations, and decreased functional mobility. PT to continue to recommend level 2, moderate resource intensity. PT to continue to follow and treat as appropriate.   Exercises   Hip Flexion Sitting;20 reps;AROM;Bilateral   Hip Adduction Sitting;20 reps;AROM;Bilateral   Knee AROM Long Arc Quad Sitting;20 reps;AROM;Bilateral   Ankle Pumps Sitting;20 reps;AROM;Bilateral   End of Consult   Patient Position at End of Consult Bedside chair;Bed/Chair alarm activated;All needs within reach  (PCA present)     PT Evaluation Time: 0835-0859    PT Treatment Time: 4509-2355  11 minutes  Aleksandra Noland, PT, DPT

## 2024-07-30 NOTE — ASSESSMENT & PLAN NOTE
Has history of sinus bradycardia  Heart rate noted to be approximately 45 on arrival  Appears to be sinus bradycardia  stable

## 2024-07-30 NOTE — PLAN OF CARE
Problem: PHYSICAL THERAPY ADULT  Goal: Performs mobility at highest level of function for planned discharge setting.  See evaluation for individualized goals.  Description: Treatment/Interventions: Functional transfer training, LE strengthening/ROM, Therapeutic exercise, Endurance training, Patient/family training, Bed mobility, Gait training, Spoke to nursing, OT          See flowsheet documentation for full assessment, interventions and recommendations.  Note: Prognosis: Good  Problem List: Decreased strength, Decreased endurance, Impaired balance, Decreased mobility, Pain  Assessment: Pt is 83 year old female seen for PT evaluation s/p admit to [facility] on 7/29/2024 with Acute on chronic diastolic congestive heart failure (HCC). PT consulted to assess pt's functional mobility and discharge needs. Order placed for PT evaluation and treatment, with up as tolerated order. Comorbidities affecting pt's physical performance at time of assessment include ambulatory dysfunction, bradycardia, chronic anticoagulation, stage 3a CKD, and radiculopathy of lumbar region. Prior to hospitalization, pt was independent with all functional mobility with a walker. Pt ambulates household distances. Pt resides with her friend, in a one level house with no steps to enter. Personal factors affecting pt at time of initial evaluation include ambulating with an assistive device, inability to ambulate household distances, inability to navigate level surfaces without external assistance, unable to perform dynamic tasks in the community, limited home support, positive fall history, difficulty performing ADLs, and inability to perform IADLs. Please find objective findings from PT assessment regarding body systems outlined above with impairments and limitations including weakness, impaired balance, decreased endurance, gait deviations, pain, decreased activity tolerance, decreased functional mobility tolerance, and fall risk. The  following objective measures were performed on initial evaluation Barthel Index: 45/100, Modified [place]: 4 (moderate/severe disability), and AM-PAC 6-Clicks: 16/24. Pt's clinical presentation is currently unstable/unpredictable seen in pt's presentation of need for ongoing medical management/monitoring, pt is a fall risk, and pt requires cues and assist for safety with functional mobility. Pt to benefit from continued PT treatment to address deficits as defined above and maximize pt's level of function and independence with mobility. From a PT standpoint, recommendation at time of discharge would be level 2, moderate resource intensity in order to facilitate return to prior level of function.    Barriers to Discharge: Decreased caregiver support     Rehab Resource Intensity Level, PT: II (Moderate Resource Intensity)    See flowsheet documentation for full assessment.

## 2024-07-30 NOTE — PROGRESS NOTES
[facility]  Progress Note  Name: Marisol Otoole I  MRN: [id number]  Unit/Bed#: -01 I Date of Admission: 7/29/2024   Date of Service: 7/30/2024 I Hospital Day: 1    Assessment & Plan   * Acute on chronic diastolic congestive heart failure (HCC)  Assessment & Plan  Presented with volume overload on exam peripheral edema  Reports compliance with oral diuretics  She is only on PRN dosing  Does report a diet high in junk food  Dietary education provided  Lower extremity swelling ins improving  Is Compliant with daily weights at home  BNP noted to be 454  Will place on IV Lasix twice daily  Echo in 2022 revealed normal EF  Will repeat echo        Chronic anticoagulation  Assessment & Plan  Patient on Coumadin presented with INR 6.6  Hold home Coumadin  Will Consider one dose of vitamin K pending repeat INR  Currently no signs of overt bleeding     Lab Results   Component Value Date    INR 5.47 () 07/30/2024    INR 6.66 () 07/29/2024         Bradycardia  Assessment & Plan  Heart rate noted to be approximately 45 on arrival  Appears to be sinus bradycardia    Ambulatory dysfunction  Assessment & Plan  Presented with generalized weakness  PT/OT eval           VTE Pharmacologic Prophylaxis: VTE Score: 6 High Risk (Score >/= 5) - Pharmacological DVT Prophylaxis Ordered: warfarin (Coumadin). Sequential Compression Devices Ordered. holding    Mobility:   Basic Mobility Inpatient Raw Score: 16  JH-HLM Goal: 5: Stand one or more mins  JH-HLM Achieved: 6: Walk 10 steps or more  JH-HLM Goal achieved. Continue to encourage appropriate mobility.    Patient Centered Rounds: I performed bedside rounds with nursing staff today. [facility]  Discussions with Specialists or Other Care Team Provider: notes    Education and Discussions with Family / Patient: Updated  (daughter) at bedside.    Total Time Spent on Date of Encounter in care of patient: 35 mins. This time was spent on one or more of  the following: performing physical exam; counseling and coordination of care; obtaining or reviewing history; documenting in the medical record; reviewing/ordering tests, medications or procedures; communicating with other healthcare professionals and discussing with patient's family/caregivers.    Current Length of Stay: 1 day(s)  Current Patient Status: Inpatient   Certification Statement: The patient will continue to require additional inpatient hospital stay due to echo and dieuretics  Discharge Plan: Anticipate discharge tomorrow to home.    Code Status: Level 1 - Full Code    Subjective:   Feeling better no respiratory symptoms, reports improvement of leg swelling, eager for dc. Dietary education provided, lives with dtr who was a bed side    Objective:     Vitals:   Temp (24hrs), Av.1 °F (36.7 °C), Min:97.8 °F (36.6 °C), Max:98.7 °F (37.1 °C)    Temp:  [97.8 °F (36.6 °C)-98.7 °F (37.1 °C)] 97.8 °F (36.6 °C)  HR:  [41-61] 61  Resp:  [14-31] 17  BP: (102-141)/(43-62) 129/58  SpO2:  [89 %-98 %] 92 %  Body mass index is 43.88 kg/m².     Input and Output Summary (last 24 hours):     Intake/Output Summary (Last 24 hours) at 2024 1216  Last data filed at 2024 0722  Gross per 24 hour   Intake 240 ml   Output 400 ml   Net -160 ml       Physical Exam:   Physical Exam  Vitals and nursing note reviewed.   Constitutional:       General: She is not in acute distress.     Appearance: She is obese. She is not ill-appearing.   Cardiovascular:      Rate and Rhythm: Normal rate.   Musculoskeletal:         General: Swelling present. Normal range of motion.   Skin:     General: Skin is warm.   Neurological:      Mental Status: She is alert. Mental status is at baseline.   Psychiatric:         Mood and Affect: Mood normal.          Additional Data:     Labs:  Results from last 7 days   Lab Units 24  0436   WBC Thousand/uL 11.37*   HEMOGLOBIN g/dL 10.4*   HEMATOCRIT % 34.6*   PLATELETS Thousands/uL 456*   SEGS  PCT % 69   LYMPHO PCT % 18   MONO PCT % 7   EOS PCT % 5     Results from last 7 days   Lab Units 07/30/24  0436 07/29/24  1242   SODIUM mmol/L 139 138   POTASSIUM mmol/L 4.7 4.5   CHLORIDE mmol/L 108 108   CO2 mmol/L 25 23   BUN mg/dL 26* 31*   CREATININE mg/dL 0.73 0.90   ANION GAP mmol/L 6 7   CALCIUM mg/dL 9.6 9.4   ALBUMIN g/dL  --  2.9*   TOTAL BILIRUBIN mg/dL  --  0.24   ALK PHOS U/L  --  95   ALT U/L  --  28   AST U/L  --  25   GLUCOSE RANDOM mg/dL 93 136     Results from last 7 days   Lab Units 07/30/24  0949   INR  5.47*                   Lines/Drains:  Invasive Devices       Peripheral Intravenous Line  Duration             Peripheral IV 04/06/24 Left Antecubital 114 days    Peripheral IV 07/29/24 Left Antecubital <1 day                  Recent Cultures (last 7 days):         Last 24 Hours Medication List:   Current Facility-Administered Medications   Medication Dose Route Frequency Provider Last Rate    acetaminophen  650 mg Oral Q4H PRN John Ritchie MD      aspirin  81 mg Oral Daily John Ritchie MD      furosemide  40 mg Intravenous BID John Ritchie MD      levothyroxine  50 mcg Oral Early Morning John Ritchie MD      losartan  25 mg Oral Daily John Ritchie MD      magnesium sulfate  4 g Intravenous Once MABEL Aguirre 4 g (07/30/24 1143)    oxybutynin  5 mg Oral Daily John Ritchie MD      pantoprazole  40 mg Oral Early Morning John Ritchie MD      pravastatin  80 mg Oral Daily With Dinner John Ritchie MD      sertraline  100 mg Oral Daily John Ritchie MD      sodium chloride (PF)  3 mL Intravenous Q1H PRN John Ritchie MD          Today, Patient Was Seen By: MABEL Aguirer    **Please Note: This note may have been constructed using a voice recognition system.**

## 2024-07-30 NOTE — WOUND OSTOMY CARE
Progress Note - Wound   Marisol Otoole 83 y.o. female MRN: [id number]  Unit/Bed#: -01 Encounter: [id number]      Assessment:   Patient admitted to [facility] due to acute on chronic diastolic congestive heart failure. History of CHF, CAD, GERD, HTN, HLD, JANICE. Wound care consulted for groin wound. Patient is agreeable to assessment, alert and oriented x4, continent of bowel and bladder, is OOB to chair, EHOB waffle cushion in place, heels elevated, is an assist of 1 with walker to stand for assessment.    1. Bilateral sacrum, buttock, hips, elbows, and heels- skin is dry, intact, blanchable.    2. Left lateral abdominal pannus MASD/ITD- Wound is linear in shape, partial thickness, mixture of pink and pale yellow tissue, with scant amount of serosanguineous drainage noted. Maira-wound is dry, intact, blanchable, no redness.    Educated patient on importance of frequent offloading of pressure via turning, repositioning, and weight-shifting. Verbalized understanding of plan of care.    No induration, fluctuance, odor, warmth, redness, or purulence noted to the above noted wound. New dressing applied. Patient tolerated well, reports mild pain to the wound. Primary nurse aware of plan of care. See flow sheets for more detailed assessment findings. Will follow along.      Skin care plans:  1-Hydraguard to bilateral sacrum, buttock and heels BID and PRN  2-Elevate heels to offload pressure.  3-Ehob cushion in chair when out of bed.  4-Moisturize skin daily with skin nourishing cream.  5-Turn/reposition q2h for pressure re-distribution on skin.   6-Left abdominal pannus- Cleanse wound with soap and water, pat dry. Apply Melgisorb rope over wound bed. Change daily and as needed for soilage/displacement.       Wound 07/30/24 Pelvis Anterior;Left (Active)   Wound Image   07/30/24 1300   Wound Description Pink;Yellow 07/30/24 1300   Maira-wound Assessment Dry;Intact 07/30/24 1300   Wound Length (cm) 1 cm 07/30/24 1300   Wound Width  (cm) 3.5 cm 07/30/24 1300   Wound Depth (cm) 0.1 cm 07/30/24 1300   Wound Surface Area (cm^2) 3.5 cm^2 07/30/24 1300   Wound Volume (cm^3) 0.35 cm^3 07/30/24 1300   Calculated Wound Volume (cm^3) 0.35 cm^3 07/30/24 1300   Drainage Amount Scant 07/30/24 1300   Drainage Description Serosanguineous 07/30/24 1300   Non-staged Wound Description Partial thickness 07/30/24 1300   Treatments Cleansed;Site care 07/30/24 1300   Dressing Calcium Alginate with Silver 07/30/24 1300   Wound packed? No 07/30/24 1300   Dressing Changed New 07/30/24 1300   Patient Tolerance Tolerated well 07/30/24 1300   Dressing Status Clean;Dry;Intact 07/30/24 1300       Contact through [facility] Secure Chat with any questions  Wound Care will continue to follow    K[place]um CANON RN CWON  Wound and Ostomy care

## 2024-07-30 NOTE — OCCUPATIONAL THERAPY NOTE
Occupational Therapy Evaluation        Patient Name: Marisol Otoole  Today's Date: 7/30/2024 07/30/24 0836   OT Last Visit   OT Visit Date 07/30/24   Note Type   Note type Evaluation   Additional Comments SLIM cleared patient for OT/PT evaluation   Pain Assessment   Pain Assessment Tool 0-10   Pain Score 6   Pain Location/Orientation Location: Back;Orientation: Lower   Pain Onset/Description Onset: Ongoing   Hospital Pain Intervention(s) Repositioned;Ambulation/increased activity;Emotional support   Restrictions/Precautions   Weight Bearing Precautions Per Order No   Braces or Orthoses Other (Comment)  (none at baseline)   Other Precautions Chair Alarm;Bed Alarm;Fall Risk;Pain  (1 liter of O2 currently- Home O2 at 2 liters PRN)   Home Living   Type of Home House   Home Layout One level;Ramped entrance  (No REGINO)   Bathroom Shower/Tub Walk-in shower   Bathroom Toilet Raised   Bathroom Equipment Grab bars in shower;Shower chair   Bathroom Accessibility Accessible   Home Equipment Walker;Cane   Additional Comments ambulatory with a RW indoors   Prior Function   Level of [place] Independent with ADLs;Needs assistance with IADLS   Lives With Friend(s)   Receives Help From Friend(s)   IADLs Family/Friend/Other provides transportation;Family/Friend/Other provides meals;Independent with medication management   Falls in the last 6 months 1 to 4  (2 falls)   Vocational Retired   Comments Home Care services OT 1 time/ week and RN   Lifestyle   Autonomy Patient was independnet with ADLs/ required assistance with IADLs. Patient lives in a one story house with ramped entrance, with a friend. Patient was ambulating with a RW.   Reciprocal Relationships Supportive friend   Service to Others Retired   Intrinsic Gratification reading at times   General   Family/Caregiver Present No   ADL   Where Assessed Edge of bed   Eating Assistance 5  Supervision/Setup   Grooming Assistance 5  Supervision/Setup   UB Bathing  Assistance 4  Minimal Assistance   LB Bathing Assistance 2  Maximal Assistance   UB Dressing Assistance 4  Minimal Assistance   LB Dressing Assistance 2  Maximal Assistance   Toileting Assistance  3  Moderate Assistance   Functional Assistance 3  Moderate Assistance   Bed Mobility   Supine to Sit 3  Moderate assistance   Additional items Assist x 1;HOB elevated;Bedrails;Increased time required;Verbal cues;LE management   Transfers   Sit to Stand 4  Minimal assistance   Additional items Assist x 1;Increased time required;Verbal cues   Stand to Sit 4  Minimal assistance   Additional items Assist x 1;Armrests;Increased time required;Verbal cues   Functional Mobility   Functional Mobility 3  Moderate assistance   Balance   Static Sitting Fair +   Dynamic Sitting Fair   Static Standing Fair -   Dynamic Standing Poor +   Ambulatory Poor +   Activity Tolerance   Activity Tolerance Patient tolerated treatment well   Medical Staff Made Aware Co-evaluation performed with OT secondary to complex medical condition of patient and regression of functional status from baseline. PT/OT goals were addressed separately.   RUE Assessment   RUE Assessment X  (limited overhead movements)   RUE Strength   RUE Overall Strength Deficits  (3/5)   LUE Assessment   LUE Assessment X  (limited overhead movements)   LUE Strength   LUE Overall Strength Deficits  (3/5)   Hand Function   Gross Motor Coordination Impaired   Fine Motor Coordination Impaired   Sensation   Light Touch No apparent deficits  (BUEs)   Vision-Basic Assessment   Current Vision Wears glasses all the time   Psychosocial   Psychosocial (WDL) WDL   Cognition   Overall Cognitive Status WFL   Arousal/Participation Alert;Responsive;Cooperative   Attention Within functional limits   Orientation Level Oriented X4   Memory Decreased recall of recent events   Following Commands Follows multistep commands without difficulty   Assessment   Limitation Decreased ADL status;Decreased UE  strength;Decreased Safe judgement during ADL;Decreased endurance;Decreased self-care trans;Decreased high-level ADLs   Prognosis Good   Assessment Patient is a 83 y.o. female seen for OT evaluation s/p admit to [facility] on 7/29/2024 w/Acute on chronic diastolic congestive heart failure (HCC). Commorbidities affecting patient's functional performance at time of assessment include:  ambulatory dysfunction, bradycardia, chronic anticoagulation, stage 3a CKD, and radiculopathy of lumbar region.  Orders placed for OT evaluation and treatment.  Performed at least two patient identifiers during session including name and wristband.  Prior to admission, Patient was independnet with ADLs/ required assistance with IADLs. Patient lives in a one story house with ramped entrance, with a friend. Patient was ambulating with a RW.  Personal factors affecting patient at time of initial evaluation include: limited caregiver support, limited insight into deficits, decreased initiation and engagement, difficulty performing ADLs, and difficulty performing IADLs. Upon evaluation, patient requires minimal  assist for UB ADLs, moderate and maximal assist for LB ADLs, transfers and functional ambulation in room and bathroom with moderate assist, with the use of Rolling Walker.   Occupational performance is affected by the following deficits: decreased functional use of BUEs, limited active ROM, decreased muscle strength, degenerative arthritic joint changes, impaired gross motor coordination, dynamic sit/ stand balance deficit with poor standing tolerance time for self care and functional mobility, decreased activity tolerance, decreased safety awareness, and postural control and postural alignment deficit, requiring external assistance to complete transitional movements.  Patient to benefit from continued Occupational Therapy treatment while in the hospital to address deficits as defined above and maximize level of  functional independence with ADLs and functional mobility. Occupational Performance areas to address include: bathing/ shower, dressing, toilet hygiene, transfer to all surfaces, functional ambulation, functional mobility, emergency response, health maintenance, Leisure Participation, and Social participation. From OT standpoint, recommendation at time of d/c would be Level 2.   Plan   Treatment Interventions ADL retraining;Functional transfer training;UE strengthening/ROM;Endurance training;Patient/family training;Equipment evaluation/education;Compensatory technique education;Continued evaluation;Energy conservation;Activityengagement   Goal Expiration Date 08/13/24   OT Frequency 3-5x/wk   Discharge Recommendation   Rehab Resource Intensity Level, OT II (Moderate Resource Intensity)   AM-PAC Daily Activity Inpatient   Lower Body Dressing 2   Bathing 2   Toileting 2   Upper Body Dressing 3   Grooming 3   Eating 4   Daily Activity Raw Score 16   Daily Activity Standardized Score (Calc for Raw Score >=11) 35.96   AM-PAC Applied Cognition Inpatient   Following a Speech/Presentation 4   Understanding Ordinary Conversation 4   Taking Medications 3   Remembering Where Things Are Placed or Put Away 3   Remembering List of 4-5 Errands 3   Taking Care of Complicated Tasks 2   Applied Cognition Raw Score 19   Applied Cognition Standardized Score 39.77   Barthel Index   Feeding 10   Bathing 0   Grooming Score 0   Dressing Score 5   Bladder Score 5   Bowels Score 10   Toilet Use Score 5   Transfers (Bed/Chair) Score 10   Mobility (Level Surface) Score 0   Stairs Score 0   Barthel Index Score 45       1 - Patient will verbalize and demonstrate use of energy conservation/ deep breathing technique and work simplification skills during functional activity with no verbal cues.    2 - Patient will verbalize and demonstrate good body mechanics and joint protection techniques during  ADLs/ IADLs with no verbal cues.    3 - Patient  will increase OOB/ sitting tolerance to 2-4 hours per day for increased participation in self care and leisure tasks with no s/s of exertion.    4 - Patient will increase standing tolerance time to 5  minutes with unilateral UE support to complete sink level ADLs@ mod I level.    5 - Patient will increase sitting tolerance at edge of bed to 20 minutes to complete UB ADLs @ set up assist level.    6 - Patient will transfer bed to Chair / toilet at Set up assist level with AD as indicated.     7 - Patient will complete UB ADLs with set up assist.     8 - Patient will complete LB ADLs with min assist with the use of adaptive equipment.     9 - Patient will complete toileting hygiene with set up assist/ supervision for thoroughness    10 - Patient/ Family  will demonstrate competency with UE Home Exercise Program.

## 2024-07-31 PROBLEM — I48.0 PAROXYSMAL A-FIB (HCC): Status: ACTIVE | Noted: 2024-07-31

## 2024-07-31 LAB
ANION GAP SERPL CALCULATED.3IONS-SCNC: 7 MMOL/L (ref 4–13)
BUN SERPL-MCNC: 35 MG/DL (ref 5–25)
CALCIUM SERPL-MCNC: 9.5 MG/DL (ref 8.4–10.2)
CHLORIDE SERPL-SCNC: 103 MMOL/L (ref 96–108)
CO2 SERPL-SCNC: 29 MMOL/L (ref 21–32)
CREAT SERPL-MCNC: 0.86 MG/DL (ref 0.6–1.3)
ERYTHROCYTE [DISTWIDTH] IN BLOOD BY AUTOMATED COUNT: 14.5 % (ref 11.6–15.1)
GFR SERPL CREATININE-BSD FRML MDRD: 62 ML/MIN/1.73SQ M
GLUCOSE SERPL-MCNC: 99 MG/DL (ref 65–140)
HCT VFR BLD AUTO: 34.1 % (ref 34.8–46.1)
HGB BLD-MCNC: 10.7 G/DL (ref 11.5–15.4)
INR PPP: 3.22 (ref 0.84–1.19)
MAGNESIUM SERPL-MCNC: 1.9 MG/DL (ref 1.9–2.7)
MCH RBC QN AUTO: 27.1 PG (ref 26.8–34.3)
MCHC RBC AUTO-ENTMCNC: 31.4 G/DL (ref 31.4–37.4)
MCV RBC AUTO: 86 FL (ref 82–98)
PLATELET # BLD AUTO: 431 THOUSANDS/UL (ref 149–390)
PMV BLD AUTO: 9.2 FL (ref 8.9–12.7)
POTASSIUM SERPL-SCNC: 4.3 MMOL/L (ref 3.5–5.3)
PROTHROMBIN TIME: 33.9 SECONDS (ref 11.6–14.5)
RBC # BLD AUTO: 3.95 MILLION/UL (ref 3.81–5.12)
SODIUM SERPL-SCNC: 139 MMOL/L (ref 135–147)
WBC # BLD AUTO: 12.31 THOUSAND/UL (ref 4.31–10.16)

## 2024-07-31 PROCEDURE — 80048 BASIC METABOLIC PNL TOTAL CA: CPT | Performed by: NURSE PRACTITIONER

## 2024-07-31 PROCEDURE — 85027 COMPLETE CBC AUTOMATED: CPT | Performed by: NURSE PRACTITIONER

## 2024-07-31 PROCEDURE — 99233 SBSQ HOSP IP/OBS HIGH 50: CPT | Performed by: STUDENT IN AN ORGANIZED HEALTH CARE EDUCATION/TRAINING PROGRAM

## 2024-07-31 PROCEDURE — 85610 PROTHROMBIN TIME: CPT | Performed by: NURSE PRACTITIONER

## 2024-07-31 PROCEDURE — 83735 ASSAY OF MAGNESIUM: CPT | Performed by: NURSE PRACTITIONER

## 2024-07-31 RX ORDER — WARFARIN SODIUM 2 MG/1
1 TABLET ORAL
Status: DISCONTINUED | OUTPATIENT
Start: 2024-07-31 | End: 2024-08-01 | Stop reason: HOSPADM

## 2024-07-31 RX ORDER — FUROSEMIDE 20 MG/1
20 TABLET ORAL
Status: DISCONTINUED | OUTPATIENT
Start: 2024-07-31 | End: 2024-08-01 | Stop reason: HOSPADM

## 2024-07-31 RX ADMIN — LOSARTAN POTASSIUM 25 MG: 25 TABLET, FILM COATED ORAL at 09:32

## 2024-07-31 RX ADMIN — PANTOPRAZOLE SODIUM 40 MG: 40 TABLET, DELAYED RELEASE ORAL at 06:43

## 2024-07-31 RX ADMIN — ASPIRIN 81 MG: 81 TABLET, COATED ORAL at 09:31

## 2024-07-31 RX ADMIN — ACETAMINOPHEN 650 MG: 325 TABLET, FILM COATED ORAL at 22:51

## 2024-07-31 RX ADMIN — PRAVASTATIN SODIUM 80 MG: 80 TABLET ORAL at 17:30

## 2024-07-31 RX ADMIN — OXYBUTYNIN 5 MG: 5 TABLET, FILM COATED, EXTENDED RELEASE ORAL at 09:31

## 2024-07-31 RX ADMIN — ACETAMINOPHEN 650 MG: 325 TABLET, FILM COATED ORAL at 09:31

## 2024-07-31 RX ADMIN — WARFARIN SODIUM 1 MG: 2 TABLET ORAL at 17:30

## 2024-07-31 RX ADMIN — SERTRALINE 100 MG: 100 TABLET, FILM COATED ORAL at 09:32

## 2024-07-31 RX ADMIN — FUROSEMIDE 40 MG: 10 INJECTION, SOLUTION INTRAMUSCULAR; INTRAVENOUS at 09:31

## 2024-07-31 RX ADMIN — LEVOTHYROXINE SODIUM 50 MCG: 0.05 TABLET ORAL at 06:43

## 2024-07-31 NOTE — PLAN OF CARE
Problem: PAIN - ADULT  Goal: Verbalizes/displays adequate comfort level or baseline comfort level  Description: Interventions:  - Encourage patient to monitor pain and request assistance  - Assess pain using appropriate pain scale  - Administer analgesics based on type and severity of pain and evaluate response  - Implement non-pharmacological measures as appropriate and evaluate response  - Consider cultural and social influences on pain and pain management  - Notify physician/advanced practitioner if interventions unsuccessful or patient reports new pain  Outcome: Progressing     Problem: INFECTION - ADULT  Goal: Absence or prevention of progression during hospitalization  Description: INTERVENTIONS:  - Assess and monitor for signs and symptoms of infection  - Monitor lab/diagnostic results  - Monitor all insertion sites, i.e. indwelling lines, tubes, and drains  - Monitor endotracheal if appropriate and nasal secretions for changes in amount and color  - [place] appropriate cooling/warming therapies per order  - Administer medications as ordered  - Instruct and encourage patient and family to use good hand hygiene technique  - Identify and instruct in appropriate isolation precautions for identified infection/condition  Outcome: Progressing  Goal: Absence of fever/infection during neutropenic period  Description: INTERVENTIONS:  - Monitor WBC    Outcome: Progressing     Problem: SAFETY ADULT  Goal: Patient will remain free of falls  Description: INTERVENTIONS:  - Educate patient/family on patient safety including physical limitations  - Instruct patient to call for assistance with activity   - Consult OT/PT to assist with strengthening/mobility   - Keep Call bell within reach  - Keep bed low and locked with side rails adjusted as appropriate  - Keep care items and personal belongings within reach  - Initiate and maintain comfort rounds  - Make Fall Risk Sign visible to staff  - Offer Toileting every 2 Hours,  in advance of need  - Initiate/Maintain bed alarm  - Obtain necessary fall risk management equipment: call bell within reach  - Apply yellow socks and bracelet for high fall risk patients  - Consider moving patient to room near nurses station  Outcome: Progressing  Goal: Maintain or return to baseline ADL function  Description: INTERVENTIONS:  -  Assess patient's ability to carry out ADLs; assess patient's baseline for ADL function and identify physical deficits which impact ability to perform ADLs (bathing, care of mouth/teeth, toileting, grooming, dressing, etc.)  - Assess/evaluate cause of self-care deficits   - Assess range of motion  - Assess patient's mobility; develop plan if impaired  - Assess patient's need for assistive devices and provide as appropriate  - Encourage maximum independence but intervene and supervise when necessary  - Involve family in performance of ADLs  - Assess for home care needs following discharge   - Consider OT consult to assist with ADL evaluation and planning for discharge  - Provide patient education as appropriate  Outcome: Progressing  Goal: Maintains/Returns to pre admission functional level  Description: INTERVENTIONS:  - Perform AM-PAC 6 Click Basic Mobility/ Daily Activity assessment daily.  - Set and communicate daily mobility goal to care team and patient/family/caregiver.   - Collaborate with rehabilitation services on mobility goals if consulted  - Perform Range of Motion 3 times a day.  - Reposition patient every 2 hours.  - Dangle patient 3 times a day  - Stand patient 3 times a day  - Ambulate patient 3 times a day  - Out of bed to chair 3 times a day   - Out of bed for meals 3 times a day  - Out of bed for toileting  - Record patient progress and toleration of activity level   Outcome: Progressing

## 2024-07-31 NOTE — PROGRESS NOTES
Patient:    MRN:  [id number]    [place] Request ID:  [id number]    Level of care reserved:  Home Health Agency    Partner Reserved:  [facility], [place] [id number] [phone]    Clinical needs requested:    Geography searched:  [id number]    Start of Service:    Request sent:  10:38am EDT on 7/30/2024 by Denise Leigh    Partner reserved:  11:08am EDT on 7/31/2024 by Denise Leigh    Choice list shared:  9:39am EDT on 7/31/2024 by Denise Leigh

## 2024-07-31 NOTE — CASE MANAGEMENT
Case Management Discharge Planning Note    Patient name Marisol Otoole  Location /-01 MRN [id number]  : 1940 Date 2024       Current Admission Date: 2024  Current Admission Diagnosis:Acute on chronic diastolic congestive heart failure (HCC)   Patient Active Problem List    Diagnosis Date Noted Date Diagnosed    Paroxysmal A-fib (HCC) 2024     Bradycardia 2024     Chronic anticoagulation 2024     Urinary frequency 2024     At risk for injury related to fall 2024     Walker as ambulation aid 2024     Ambulatory dysfunction 10/24/2023     Fall 2023     Orbital mass 2023     Stage 3a chronic kidney disease (HCC) 2022     Radiculopathy, lumbar region 07/10/2021     Chronic hypoxemic respiratory failure (HCC) 06/10/2021     Depression, recurrent (HCC) 2021     Osteopenia 10/22/2020     Insomnia 2020     Depression with anxiety 2019     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/2018     Primary osteoarthritis of left shoulder      AVB (atrioventricular block)      Acute on chronic diastolic congestive heart failure (HCC)      COPD, mild (HCC)      Coronary artery disease involving native coronary artery of native heart without angina pectoris      Gastroesophageal reflux disease without esophagitis 2018     Iron deficiency anemia secondary to inadequate dietary iron intake 2018     Obesity, morbid (HCC) 2018     Acute diastolic congestive heart failure (HCC) 2018     JANICE (obstructive sleep apnea) 2018     Hyperlipidemia 2017     Primary hypertension 2017     Acquired hypothyroidism 10/29/2017     LVH (left ventricular hypertrophy) 2016     Mitral annular calcification 2016     Mitral valve stenosis 2016     Pulmonary hypertension (HCC) 2016       LOS (days): 2  Geometric Mean LOS (GMLOS) (days): 3.9  Days to GMLOS:2     OBJECTIVE:  Risk of  Unplanned Readmission Score: 20.62         Current admission status: Inpatient   Preferred Pharmacy:   [facility] - [place] - [address]  [address]  [place] [id number]  Phone: [phone] Fax: [phone]    Primary Care Provider: MABEL Hallman    Primary Insurance: MEDICARE  Secondary Insurance:     DISCHARGE DETAILS:  As per SLIM rounds, pt is anticipated for dc today. Pt choice list reviewed, pt choice is residential homecare which was reserved on [facility]. AVS updated. CM continues to follow.                    Requested Home Health Care         [facility] Health Agency Name:: [facility]  HHA External Referral Reason (only applicable if external HHA name selected): Patient has established relationship with provider  Home Health Follow-Up Provider:: PCP  Home Health Services Needed:: Oxygen Via Nasal Cannula, Heart Failure Management, Strengthening/Theraputic Exercises to Improve Function  Oxygen LPM Ordered (if applicable based on home health services needed):: 2 LPM  Homebound Criteria Met:: Uses an Assist Device (i.e. cane, walker, etc), Requires the Assistance of Another Person for Safe Ambulation or to Leave the Home  Supporting Clincal Findings:: Limited Endurance, Requires Oxygen, Fatigues Easliy in Short Distances

## 2024-07-31 NOTE — ASSESSMENT & PLAN NOTE
Present on admission history of paroxysmal A-fib.  Currently not on rate controlling agent due to tendency of bradycardia.  Currently on Coumadin and INR supratherapeutic 3.22  Resuming low-dose Coumadin milligram tonight.  Monitor INR tomorrow morning.     [facility]    Progress Note    Berna Perfect Patient Status:  Inpatient    1944 MRN [id number]   Location [facility] 3W/SW Attending Jean Claude Covarrubias MD   Hosp Day # 3 PCP Juan Pierce MD       Assessment and Plan: Abdomen soft, nontender, nondistended,   Ext:  no clubbing, no cyanosis,no edema  Neuro: no focal deficits  Skin: no rashes or lesions    Scheduled Meds:   • digoxin  125 mcg Oral Daily   • Metoprolol Succinate ER  37.5 mg Oral 2x Daily(Beta Blocker)   • G

## 2024-07-31 NOTE — PLAN OF CARE
Problem: PAIN - ADULT  Goal: Verbalizes/displays adequate comfort level or baseline comfort level  Description: Interventions:  - Encourage patient to monitor pain and request assistance  - Assess pain using appropriate pain scale  - Administer analgesics based on type and severity of pain and evaluate response  - Implement non-pharmacological measures as appropriate and evaluate response  - Consider cultural and social influences on pain and pain management  - Notify physician/advanced practitioner if interventions unsuccessful or patient reports new pain  Outcome: Progressing     Problem: INFECTION - ADULT  Goal: Absence or prevention of progression during hospitalization  Description: INTERVENTIONS:  - Assess and monitor for signs and symptoms of infection  - Monitor lab/diagnostic results  - Monitor all insertion sites, i.e. indwelling lines, tubes, and drains  - Monitor endotracheal if appropriate and nasal secretions for changes in amount and color  - [place] appropriate cooling/warming therapies per order  - Administer medications as ordered  - Instruct and encourage patient and family to use good hand hygiene technique  - Identify and instruct in appropriate isolation precautions for identified infection/condition  Outcome: Progressing

## 2024-07-31 NOTE — PROGRESS NOTES
Critical access hospital  Progress Note  Name: Marisol Otoole I  MRN: [id number]  Unit/Bed#: -01 I Date of Admission: 7/29/2024   Date of Service: 7/31/2024 I Hospital Day: 2    Assessment & Plan   * Acute on chronic diastolic congestive heart failure (HCC)  Assessment & Plan  Present on admission history of chronic diastolic heart failure.  Patient is on Lasix 40 mg daily as needed at home.  Hospitalized due to volume overload and did well with IV Lasix 40 mg twice daily.    Currently appears comfortable on discharge.  Appears euvolemic.  O2 saturation 94% on room air.  Patient does have chronic home O2 at home however she uses as needed.  Transition to p.o. Lasix 20 mg twice daily.  Plan for discharge tomorrow.    Paroxysmal A-fib (HCC)  Assessment & Plan  Present on admission history of paroxysmal A-fib.  Currently not on rate controlling agent due to tendency of bradycardia.  Currently on Coumadin and INR supratherapeutic 3.22  Resuming low-dose Coumadin milligram tonight.  Monitor INR tomorrow morning.      Chronic anticoagulation  Assessment & Plan  Patient on Coumadin presented with INR 6.6  Hold home Coumadin  Currently INR 3.22.         Bradycardia  Assessment & Plan  Has history of sinus bradycardia  Heart rate noted to be approximately 45 on arrival  Appears to be sinus bradycardia  stable    Ambulatory dysfunction  Assessment & Plan  Presented with generalized weakness  PT/OT recommendation appreciated.    JANICE (obstructive sleep apnea)  Assessment & Plan  Continue CPAP qhs    Acquired hypothyroidism  Assessment & Plan  Continue home dose of levothyroxine.               VTE Pharmacologic Prophylaxis: VTE Score: 6 Moderate Risk (Score 3-4) - Pharmacological DVT Prophylaxis Ordered: warfarin (Coumadin).    Mobility:   Basic Mobility Inpatient Raw Score: 16  JH-HLM Goal: 5: Stand one or more mins  -HLM Achieved: 6: Walk 10 steps or more      Patient Centered Rounds: I performed bedside  rounds with nursing staff today.     Total Time Spent on Date of Encounter in care of patient: 35 mins. This time was spent on one or more of the following: performing physical exam; counseling and coordination of care; obtaining or reviewing history; documenting in the medical record; reviewing/ordering tests, medications or procedures; communicating with other healthcare professionals and discussing with patient's family/caregivers.    Current Length of Stay: 2 day(s)  Current Patient Status: Inpatient   Certification Statement: The patient will continue to require additional inpatient hospital stay due to leukocytosis, transition to p.o. Lasix  Discharge Plan: Anticipate discharge tomorrow to home with home services.    Code Status: Level 1 - Full Code    Subjective:   Patient appears comfortable nondistressed.  With saturation 94% on room air.  Reports using home oxygen as needed only.  Mild leukocytosis without clear infectious etiology.  Currently she reports for a few much better and eager to go home.  Denies chest pain, dyspnea, fever, chills, nausea, vomiting, diarrhea, any other new complaints.    Objective:     Vitals:   Temp (24hrs), Av.1 °F (36.7 °C), Min:97.7 °F (36.5 °C), Max:98.5 °F (36.9 °C)    Temp:  [97.7 °F (36.5 °C)-98.5 °F (36.9 °C)] 97.7 °F (36.5 °C)  HR:  [47-57] 47  Resp:  [16-20] 16  BP: ()/(52-66) 129/56  SpO2:  [89 %-96 %] 95 %  Body mass index is 43.06 kg/m².     Input and Output Summary (last 24 hours):     Intake/Output Summary (Last 24 hours) at 2024 1450  Last data filed at 2024 1224  Gross per 24 hour   Intake 710 ml   Output 100 ml   Net 610 ml       Physical Exam:   Physical Exam  Constitutional:       General: She is not in acute distress.     Appearance: Normal appearance. She is obese. She is not ill-appearing, toxic-appearing or diaphoretic.   HENT:      Head: Normocephalic and atraumatic.   Eyes:      Pupils: Pupils are equal, round, and reactive to light.    Cardiovascular:      Rate and Rhythm: Bradycardia present.      Pulses: Normal pulses.   Pulmonary:      Effort: Pulmonary effort is normal. No respiratory distress.      Breath sounds: No wheezing or rales.      Comments: O2 sats 94 to 95% room air.  Abdominal:      General: Abdomen is flat. Bowel sounds are normal. There is no distension.      Palpations: Abdomen is soft.      Tenderness: There is no abdominal tenderness.   Musculoskeletal:      Right lower leg: No edema.      Left lower leg: No edema.   Neurological:      Mental Status: She is alert and oriented to person, place, and time.   Psychiatric:         Mood and Affect: Mood normal.         Behavior: Behavior normal.          Additional Data:     Labs:  Results from last 7 days   Lab Units 07/31/24 0528 07/30/24 0436   WBC Thousand/uL 12.31* 11.37*   HEMOGLOBIN g/dL 10.7* 10.4*   HEMATOCRIT % 34.1* 34.6*   PLATELETS Thousands/uL 431* 456*   SEGS PCT %  --  69   LYMPHO PCT %  --  18   MONO PCT %  --  7   EOS PCT %  --  5     Results from last 7 days   Lab Units 07/31/24 0528 07/30/24  0436 07/29/24  1242   SODIUM mmol/L 139   < > 138   POTASSIUM mmol/L 4.3   < > 4.5   CHLORIDE mmol/L 103   < > 108   CO2 mmol/L 29   < > 23   BUN mg/dL 35*   < > 31*   CREATININE mg/dL 0.86   < > 0.90   ANION GAP mmol/L 7   < > 7   CALCIUM mg/dL 9.5   < > 9.4   ALBUMIN g/dL  --   --  2.9*   TOTAL BILIRUBIN mg/dL  --   --  0.24   ALK PHOS U/L  --   --  95   ALT U/L  --   --  28   AST U/L  --   --  25   GLUCOSE RANDOM mg/dL 99   < > 136    < > = values in this interval not displayed.     Results from last 7 days   Lab Units 07/31/24 0528   INR  3.22*                   Lines/Drains:  Invasive Devices       Peripheral Intravenous Line  Duration             Peripheral IV 04/06/24 Left Antecubital 116 days    Peripheral IV 07/29/24 Left Antecubital 2 days                          Recent Cultures (last 7 days):         Last 24 Hours Medication List:   Current  Facility-Administered Medications   Medication Dose Route Frequency Provider Last Rate    acetaminophen  650 mg Oral Q4H PRN John Ritchie MD      aspirin  81 mg Oral Daily John Ritchie MD      furosemide  20 mg Oral BID (diuretic) Javier FORDE MD      levothyroxine  50 mcg Oral Early Morning John Ritchie MD      losartan  25 mg Oral Daily John Ritchie MD      oxybutynin  5 mg Oral Daily John Ritchie MD      pantoprazole  40 mg Oral Early Morning John Ritchie MD      pravastatin  80 mg Oral Daily With Dinner John Ritchie MD      sertraline  100 mg Oral Daily John Ritchie MD      sodium chloride (PF)  3 mL Intravenous Q1H PRN John Ritchie MD      warfarin  1 mg Oral Daily (warfarin) Javier FORDE MD          Today, Patient Was Seen By: Javier Poon MD    **Please Note: This note may have been constructed using a voice recognition system.**

## 2024-08-01 ENCOUNTER — TRANSITIONAL CARE MANAGEMENT (OUTPATIENT)
Dept: FAMILY MEDICINE CLINIC | Facility: CLINIC | Age: 84
End: 2024-08-01

## 2024-08-01 VITALS
BODY MASS INDEX: 41.07 KG/M2 | HEART RATE: 54 BPM | WEIGHT: 177.47 LBS | SYSTOLIC BLOOD PRESSURE: 118 MMHG | HEIGHT: 55 IN | TEMPERATURE: 97.5 F | RESPIRATION RATE: 18 BRPM | DIASTOLIC BLOOD PRESSURE: 46 MMHG | OXYGEN SATURATION: 96 %

## 2024-08-01 LAB
ANION GAP SERPL CALCULATED.3IONS-SCNC: 6 MMOL/L (ref 4–13)
BUN SERPL-MCNC: 38 MG/DL (ref 5–25)
CALCIUM SERPL-MCNC: 9.7 MG/DL (ref 8.4–10.2)
CHLORIDE SERPL-SCNC: 102 MMOL/L (ref 96–108)
CO2 SERPL-SCNC: 30 MMOL/L (ref 21–32)
CREAT SERPL-MCNC: 0.95 MG/DL (ref 0.6–1.3)
ERYTHROCYTE [DISTWIDTH] IN BLOOD BY AUTOMATED COUNT: 14.4 % (ref 11.6–15.1)
GFR SERPL CREATININE-BSD FRML MDRD: 55 ML/MIN/1.73SQ M
GLUCOSE SERPL-MCNC: 93 MG/DL (ref 65–140)
HCT VFR BLD AUTO: 34.8 % (ref 34.8–46.1)
HGB BLD-MCNC: 10.6 G/DL (ref 11.5–15.4)
INR PPP: 2.8 (ref 0.84–1.19)
MCH RBC QN AUTO: 26.6 PG (ref 26.8–34.3)
MCHC RBC AUTO-ENTMCNC: 30.5 G/DL (ref 31.4–37.4)
MCV RBC AUTO: 87 FL (ref 82–98)
PLATELET # BLD AUTO: 438 THOUSANDS/UL (ref 149–390)
PMV BLD AUTO: 9.5 FL (ref 8.9–12.7)
POTASSIUM SERPL-SCNC: 4 MMOL/L (ref 3.5–5.3)
PROCALCITONIN SERPL-MCNC: 0.07 NG/ML
PROTHROMBIN TIME: 30.4 SECONDS (ref 11.6–14.5)
RBC # BLD AUTO: 3.98 MILLION/UL (ref 3.81–5.12)
SODIUM SERPL-SCNC: 138 MMOL/L (ref 135–147)
WBC # BLD AUTO: 12.01 THOUSAND/UL (ref 4.31–10.16)

## 2024-08-01 PROCEDURE — 84145 PROCALCITONIN (PCT): CPT | Performed by: STUDENT IN AN ORGANIZED HEALTH CARE EDUCATION/TRAINING PROGRAM

## 2024-08-01 PROCEDURE — 85610 PROTHROMBIN TIME: CPT | Performed by: STUDENT IN AN ORGANIZED HEALTH CARE EDUCATION/TRAINING PROGRAM

## 2024-08-01 PROCEDURE — 85027 COMPLETE CBC AUTOMATED: CPT | Performed by: STUDENT IN AN ORGANIZED HEALTH CARE EDUCATION/TRAINING PROGRAM

## 2024-08-01 PROCEDURE — 80048 BASIC METABOLIC PNL TOTAL CA: CPT | Performed by: STUDENT IN AN ORGANIZED HEALTH CARE EDUCATION/TRAINING PROGRAM

## 2024-08-01 PROCEDURE — 99239 HOSP IP/OBS DSCHRG MGMT >30: CPT | Performed by: STUDENT IN AN ORGANIZED HEALTH CARE EDUCATION/TRAINING PROGRAM

## 2024-08-01 RX ORDER — FUROSEMIDE 20 MG/1
20 TABLET ORAL 2 TIMES DAILY
Qty: 60 TABLET | Refills: 0 | Status: SHIPPED | OUTPATIENT
Start: 2024-08-01

## 2024-08-01 RX ADMIN — OXYBUTYNIN 5 MG: 5 TABLET, FILM COATED, EXTENDED RELEASE ORAL at 08:31

## 2024-08-01 RX ADMIN — SERTRALINE 100 MG: 100 TABLET, FILM COATED ORAL at 08:31

## 2024-08-01 RX ADMIN — LEVOTHYROXINE SODIUM 50 MCG: 0.05 TABLET ORAL at 06:09

## 2024-08-01 RX ADMIN — PANTOPRAZOLE SODIUM 40 MG: 40 TABLET, DELAYED RELEASE ORAL at 06:08

## 2024-08-01 RX ADMIN — ASPIRIN 81 MG: 81 TABLET, COATED ORAL at 08:31

## 2024-08-01 RX ADMIN — FUROSEMIDE 20 MG: 20 TABLET ORAL at 08:31

## 2024-08-01 RX ADMIN — LOSARTAN POTASSIUM 25 MG: 25 TABLET, FILM COATED ORAL at 08:31

## 2024-08-01 NOTE — DISCHARGE INSTR - AVS FIRST PAGE
Recommend consult with primary care provider within 1 week of discharge.  Recommended repeat CBC, BMP within 1 week with primary care provider.  Recommend outpatient follow-up with heme-onc for leukocytosis of unclear etiology.  Resume home dose Coumadin and continue outpatient INR monitoring with primary care provider.      1. What is my main problem? (i.e., why was I in the hospital?)   You are admitted to the hospital with heart failure exacerbation.     2, What do I need to do? (i.e., how do I manage at home, and what should I do if I run into problems?)   1. Take your medications as prescribed.    2. Follow up with your primary care provider or cardiologist within 1-2 weeks.   3. Weigh yourself every morning. Use the same scale, in the same spot. Do this every morning after you use the bathroom, but before you eat or drink. Wear the same type of clothing each time. Write down your weight and call your healthcare provider if you have a sudden weight gain.    4. Limit sodium (salt). Limit your sodium (salt) intake to a maximum of 2,000 milligrams (mg) a day. If you add salt to food as you cook, do not add more salt at the table.   5. Call your doctor if you have symptoms of worsening heart failure:     Shortness of breath at rest, at night, or that is getting worse in any way   Weight gain of 3 or more pounds (1.4 kg) in a day, or 5 pounds in one week  More swelling in your legs or ankles   More coughing   Loss of appetite   Feeling tired all the time     3. Why is it important for me to do this?   Heart failure is a condition that does not allow your heart to fill or pump properly. Not enough oxygen in your blood gets to your organs and tissues. Fluid may not move through your body properly. Fluid builds up and causes swelling and trouble breathing. This is also known as congestive heart failure. Heart failure is a serious long-term (chronic) condition that tends to get worse over time. However, you can live a  full, active life with the right medical treatment and attention to your lifestyle. It is important to manage your health to improve your quality of life.        Join a support group: Heart failure can be difficult to manage. It may be helpful to talk with others who have heart failure. You may learn how to better manage your condition or get emotional support. For more information:     We have also included a link to [facility] video series about Heart Failure:    [email or web address]        [employer]   [address] [place] [id number]   Phone: [phone]   Web Address: http://www.heart.org

## 2024-08-01 NOTE — PLAN OF CARE
Problem: PAIN - ADULT  Goal: Verbalizes/displays adequate comfort level or baseline comfort level  Description: Interventions:  - Encourage patient to monitor pain and request assistance  - Assess pain using appropriate pain scale  - Administer analgesics based on type and severity of pain and evaluate response  - Implement non-pharmacological measures as appropriate and evaluate response  - Consider cultural and social influences on pain and pain management  - Notify physician/advanced practitioner if interventions unsuccessful or patient reports new pain  Outcome: Progressing     Problem: INFECTION - ADULT  Goal: Absence or prevention of progression during hospitalization  Description: INTERVENTIONS:  - Assess and monitor for signs and symptoms of infection  - Monitor lab/diagnostic results  - Monitor all insertion sites, i.e. indwelling lines, tubes, and drains  - Monitor endotracheal if appropriate and nasal secretions for changes in amount and color  - [place] appropriate cooling/warming therapies per order  - Administer medications as ordered  - Instruct and encourage patient and family to use good hand hygiene technique  - Identify and instruct in appropriate isolation precautions for identified infection/condition  Outcome: Progressing     Problem: DISCHARGE PLANNING  Goal: Discharge to home or other facility with appropriate resources  Description: INTERVENTIONS:  - Identify barriers to discharge w/patient and caregiver  - Arrange for needed discharge resources and transportation as appropriate  - Identify discharge learning needs (meds, wound care, etc.)  - Arrange for interpretive services to assist at discharge as needed  - Refer to Case Management Department for coordinating discharge planning if the patient needs post-hospital services based on physician/advanced practitioner order or complex needs related to functional status, cognitive ability, or social support system  Outcome: Progressing      Problem: Decreased Cardiac Output  Goal: Cardiac output adequate for individual needs  Description: INTERVENTIONS: Monitor for signs and symptoms of decreased cardiac output   - Monitor for dyspnea with exertion and at rest  - Monitor for orthopnea  - Monitor for signs of tachycardia. Place patient on telemetry monitoring.  - Assess patient for jugular vein distention  - Assess patient for lower extremity edema and poor peripheral perfusion   - Auscultate lung sound for Fine bibasilar crackles   - Monitor for cardiac arrythmias   - Administer beta blockers, antiarrhythmic, and blood pressure medications as ordered    Outcome: Progressing     Problem: Impaired Gas Exchange  Goal: Optimize oxygenation and ensure adequate ventilation  Description: INTERVENTIONS: Monitor for signs and symptoms of respiratory distress                - Elevate HOB or use high fowlers to promote lung expansion                - Administer oxygen as ordered to maintain adequate oxygenation                - Encourage use of IS to promote lung expansion and prevent PN                - Monitor ABGs to assess oxygenation status                - Monitor blood oxygen level to maintain adequate oxygenation                - Encourage cough and deep breathing exercises to promote lung expansion                - Monitor patient's mental status for increased confusion    Outcome: Progressing

## 2024-08-01 NOTE — PLAN OF CARE
Problem: PAIN - ADULT  Goal: Verbalizes/displays adequate comfort level or baseline comfort level  Description: Interventions:  - Encourage patient to monitor pain and request assistance  - Assess pain using appropriate pain scale  - Administer analgesics based on type and severity of pain and evaluate response  - Implement non-pharmacological measures as appropriate and evaluate response  - Consider cultural and social influences on pain and pain management  - Notify physician/advanced practitioner if interventions unsuccessful or patient reports new pain  Outcome: Progressing     Problem: INFECTION - ADULT  Goal: Absence or prevention of progression during hospitalization  Description: INTERVENTIONS:  - Assess and monitor for signs and symptoms of infection  - Monitor lab/diagnostic results  - Monitor all insertion sites, i.e. indwelling lines, tubes, and drains  - Monitor endotracheal if appropriate and nasal secretions for changes in amount and color  - [place] appropriate cooling/warming therapies per order  - Administer medications as ordered  - Instruct and encourage patient and family to use good hand hygiene technique  - Identify and instruct in appropriate isolation precautions for identified infection/condition  Outcome: Progressing  Goal: Absence of fever/infection during neutropenic period  Description: INTERVENTIONS:  - Monitor WBC    Outcome: Progressing     Problem: SAFETY ADULT  Goal: Patient will remain free of falls  Description: INTERVENTIONS:  - Educate patient/family on patient safety including physical limitations  - Instruct patient to call for assistance with activity   - Consult OT/PT to assist with strengthening/mobility   - Keep Call bell within reach  - Keep bed low and locked with side rails adjusted as appropriate  - Keep care items and personal belongings within reach  - Initiate and maintain comfort rounds  - Make Fall Risk Sign visible to staff  - Offer Toileting every 2 Hours,  in advance of need  - Initiate/Maintain bed alarm  - Obtain necessary fall risk management equipment:   - Apply yellow socks and bracelet for high fall risk patients  - Consider moving patient to room near nurses station  Outcome: Progressing  Goal: Maintain or return to baseline ADL function  Description: INTERVENTIONS:  -  Assess patient's ability to carry out ADLs; assess patient's baseline for ADL function and identify physical deficits which impact ability to perform ADLs (bathing, care of mouth/teeth, toileting, grooming, dressing, etc.)  - Assess/evaluate cause of self-care deficits   - Assess range of motion  - Assess patient's mobility; develop plan if impaired  - Assess patient's need for assistive devices and provide as appropriate  - Encourage maximum independence but intervene and supervise when necessary  - Involve family in performance of ADLs  - Assess for home care needs following discharge   - Consider OT consult to assist with ADL evaluation and planning for discharge  - Provide patient education as appropriate  Outcome: Progressing  Goal: Maintains/Returns to pre admission functional level  Description: INTERVENTIONS:  - Perform AM-PAC 6 Click Basic Mobility/ Daily Activity assessment daily.  - Set and communicate daily mobility goal to care team and patient/family/caregiver.   - Collaborate with rehabilitation services on mobility goals if consulted  - Perform Range of Motion 2 times a day.  - Reposition patient every 2 hours.  - Dangle patient 2 times a day  - Stand patient 2 times a day  - Ambulate patient 2 times a day  - Out of bed to chair 2 times a day   - Out of bed for meals 2 times a day  - Out of bed for toileting  - Record patient progress and toleration of activity level   Outcome: Progressing     Problem: DISCHARGE PLANNING  Goal: Discharge to home or other facility with appropriate resources  Description: INTERVENTIONS:  - Identify barriers to discharge w/patient and caregiver  -  Arrange for needed discharge resources and transportation as appropriate  - Identify discharge learning needs (meds, wound care, etc.)  - Arrange for interpretive services to assist at discharge as needed  - Refer to Case Management Department for coordinating discharge planning if the patient needs post-hospital services based on physician/advanced practitioner order or complex needs related to functional status, cognitive ability, or social support system  Outcome: Progressing     Problem: Knowledge Deficit  Goal: Patient/family/caregiver demonstrates understanding of disease process, treatment plan, medications, and discharge instructions  Description: Complete learning assessment and assess knowledge base.  Interventions:  - Provide teaching at level of understanding  - Provide teaching via preferred learning methods  Outcome: Progressing     Problem: Decreased Cardiac Output  Goal: Cardiac output adequate for individual needs  Description: INTERVENTIONS: Monitor for signs and symptoms of decreased cardiac output   - Monitor for dyspnea with exertion and at rest  - Monitor for orthopnea  - Monitor for signs of tachycardia. Place patient on telemetry monitoring.  - Assess patient for jugular vein distention  - Assess patient for lower extremity edema and poor peripheral perfusion   - Auscultate lung sound for Fine bibasilar crackles   - Monitor for cardiac arrythmias   - Administer beta blockers, antiarrhythmic, and blood pressure medications as ordered    Outcome: Progressing     Problem: Impaired Gas Exchange  Goal: Optimize oxygenation and ensure adequate ventilation  Description: INTERVENTIONS: Monitor for signs and symptoms of respiratory distress                - Elevate HOB or use high fowlers to promote lung expansion                - Administer oxygen as ordered to maintain adequate oxygenation                - Encourage use of IS to promote lung expansion and prevent PN                - Monitor ABGs to  assess oxygenation status                - Monitor blood oxygen level to maintain adequate oxygenation                - Encourage cough and deep breathing exercises to promote lung expansion                - Monitor patient's mental status for increased confusion    Outcome: Progressing     Problem: Excess Fluid Volume  Goal: Patient is able to achieve and maintain homeostasis  Description: INTERVENTIONS: Monitor for sign and symptoms of fluid overload  - Evaluate LE edema every shift  - Elevate LE to prevent dependent edema  - Apply KAJAL stockings as ordered   - Monitor ankle circumference daily  - Assess for jugular vein distention  - Evaluate provider orders for the CHF diuretic algorithm. Administer diuretics as ordered  - Weigh the patient daily at 0600 and report a weight gain of five pounds or more   - Strict intake and output  - Monitor fluid intake and adhere to fluid restrictions  - Assess lung sounds every shift and as needed  - Monitor vital signs and lab values (CBC, chem, BUN, BNP)  - Measure and document urine output    Outcome: Progressing     Problem: Activity Intolerance  Goal: Patient is able to perform activities within their limitations  Description: INTERVENTIONS:                       -   Alternate periods of activity with periods of rest                 -   Patients is able to maintain normal vitals heart rhythm during activity                 -   Gradually increase activity and exercise as patient can tolerate                 -   Monitor blood pressure and heart before and after exercise                  -   Monitor blood oxygen saturation during activity and apply oxygen as needed    Outcome: Progressing     Problem: Knowledge Deficit  Goal: Patient is able to verbalize understanding of Heart Failure after education  Description: INTERVENTIONS:  - Educate the patient and family on signs and symptoms of HF  - Provide the patient with HF education and HF zone tool  - Educate on the importance  of daily weight in the AM and reporting a weight gain               of 3 or more pounds to their primary care physician  - Monitor for SOB  - Maintain and sodium and fluid restriction  - Educate the patient on the importance of medications such as: diuretics, betablockers,               antiarrhythmics and their purpose, dose, route, side effects and labs               if they are needed    Outcome: Progressing     Problem: Prexisting or High Potential for Compromised Skin Integrity  Goal: Skin integrity is maintained or improved  Description: INTERVENTIONS:  - Identify patients at risk for skin breakdown  - Assess and monitor skin integrity  - Assess and monitor nutrition and hydration status  - Monitor labs   - Assess for incontinence   - Turn and reposition patient  - Assist with mobility/ambulation  - Relieve pressure over bony prominences  - Avoid friction and shearing  - Provide appropriate hygiene as needed including keeping skin clean and dry  - Evaluate need for skin moisturizer/barrier cream  - Collaborate with interdisciplinary team   - Patient/family teaching  - Consider wound care consult   Outcome: Progressing

## 2024-08-01 NOTE — NURSING NOTE
Patient left at this time with friend. IV and masimo removed. AVS reviewed. No needs at time of discharge.

## 2024-08-01 NOTE — PLAN OF CARE
Problem: PAIN - ADULT  Goal: Verbalizes/displays adequate comfort level or baseline comfort level  Description: Interventions:  - Encourage patient to monitor pain and request assistance  - Assess pain using appropriate pain scale  - Administer analgesics based on type and severity of pain and evaluate response  - Implement non-pharmacological measures as appropriate and evaluate response  - Consider cultural and social influences on pain and pain management  - Notify physician/advanced practitioner if interventions unsuccessful or patient reports new pain  8/1/2024 1139 by Elena Cerrato RN  Outcome: Adequate for Discharge  8/1/2024 0933 by Elena Cerrato RN  Outcome: Progressing     Problem: INFECTION - ADULT  Goal: Absence or prevention of progression during hospitalization  Description: INTERVENTIONS:  - Assess and monitor for signs and symptoms of infection  - Monitor lab/diagnostic results  - Monitor all insertion sites, i.e. indwelling lines, tubes, and drains  - Monitor endotracheal if appropriate and nasal secretions for changes in amount and color  - [place] appropriate cooling/warming therapies per order  - Administer medications as ordered  - Instruct and encourage patient and family to use good hand hygiene technique  - Identify and instruct in appropriate isolation precautions for identified infection/condition  8/1/2024 1139 by Elena Cerrato RN  Outcome: Adequate for Discharge  8/1/2024 0933 by Elena Cerrato RN  Outcome: Progressing  Goal: Absence of fever/infection during neutropenic period  Description: INTERVENTIONS:  - Monitor WBC    8/1/2024 1139 by Elena Cerrato RN  Outcome: Adequate for Discharge  8/1/2024 0933 by Elena Cerrato RN  Outcome: Progressing     Problem: SAFETY ADULT  Goal: Patient will remain free of falls  Description: INTERVENTIONS:  - Educate patient/family on patient safety including physical limitations  - Instruct patient to call for assistance with  activity   - Consult OT/PT to assist with strengthening/mobility   - Keep Call bell within reach  - Keep bed low and locked with side rails adjusted as appropriate  - Keep care items and personal belongings within reach  - Initiate and maintain comfort rounds  - Make Fall Risk Sign visible to staff  - Offer Toileting every 2 Hours, in advance of need  - Initiate/Maintain bed alarm  - Obtain necessary fall risk management equipment:   - Apply yellow socks and bracelet for high fall risk patients  - Consider moving patient to room near nurses station  8/1/2024 1139 by Elena Cerrato RN  Outcome: Adequate for Discharge  8/1/2024 0933 by Elena Cerrato RN  Outcome: Progressing  Goal: Maintain or return to baseline ADL function  Description: INTERVENTIONS:  -  Assess patient's ability to carry out ADLs; assess patient's baseline for ADL function and identify physical deficits which impact ability to perform ADLs (bathing, care of mouth/teeth, toileting, grooming, dressing, etc.)  - Assess/evaluate cause of self-care deficits   - Assess range of motion  - Assess patient's mobility; develop plan if impaired  - Assess patient's need for assistive devices and provide as appropriate  - Encourage maximum independence but intervene and supervise when necessary  - Involve family in performance of ADLs  - Assess for home care needs following discharge   - Consider OT consult to assist with ADL evaluation and planning for discharge  - Provide patient education as appropriate  8/1/2024 1139 by Elena Cerrato RN  Outcome: Adequate for Discharge  8/1/2024 0933 by Elena Cerrato RN  Outcome: Progressing  Goal: Maintains/Returns to pre admission functional level  Description: INTERVENTIONS:  - Perform AM-PAC 6 Click Basic Mobility/ Daily Activity assessment daily.  - Set and communicate daily mobility goal to care team and patient/family/caregiver.   - Collaborate with rehabilitation services on mobility goals if consulted  -  Perform Range of Motion 2 times a day.  - Reposition patient every 2 hours.  - Dangle patient 2 times a day  - Stand patient 2 times a day  - Ambulate patient 2 times a day  - Out of bed to chair 2 times a day   - Out of bed for meals 2 times a day  - Out of bed for toileting  - Record patient progress and toleration of activity level   8/1/2024 1139 by Elena Cerrato RN  Outcome: Adequate for Discharge  8/1/2024 0933 by Elena Cerrato RN  Outcome: Progressing     Problem: DISCHARGE PLANNING  Goal: Discharge to home or other facility with appropriate resources  Description: INTERVENTIONS:  - Identify barriers to discharge w/patient and caregiver  - Arrange for needed discharge resources and transportation as appropriate  - Identify discharge learning needs (meds, wound care, etc.)  - Arrange for interpretive services to assist at discharge as needed  - Refer to Case Management Department for coordinating discharge planning if the patient needs post-hospital services based on physician/advanced practitioner order or complex needs related to functional status, cognitive ability, or social support system  8/1/2024 1139 by Elena Cerrato RN  Outcome: Adequate for Discharge  8/1/2024 0933 by Elena Cerrato RN  Outcome: Progressing     Problem: Knowledge Deficit  Goal: Patient/family/caregiver demonstrates understanding of disease process, treatment plan, medications, and discharge instructions  Description: Complete learning assessment and assess knowledge base.  Interventions:  - Provide teaching at level of understanding  - Provide teaching via preferred learning methods  8/1/2024 1139 by Elena Cerrato RN  Outcome: Adequate for Discharge  8/1/2024 0933 by Elena Cerrato RN  Outcome: Progressing     Problem: Decreased Cardiac Output  Goal: Cardiac output adequate for individual needs  Description: INTERVENTIONS: Monitor for signs and symptoms of decreased cardiac output   - Monitor for dyspnea with exertion  and at rest  - Monitor for orthopnea  - Monitor for signs of tachycardia. Place patient on telemetry monitoring.  - Assess patient for jugular vein distention  - Assess patient for lower extremity edema and poor peripheral perfusion   - Auscultate lung sound for Fine bibasilar crackles   - Monitor for cardiac arrythmias   - Administer beta blockers, antiarrhythmic, and blood pressure medications as ordered    8/1/2024 1139 by Elena Cerrato RN  Outcome: Adequate for Discharge  8/1/2024 0933 by Elena Cerrato RN  Outcome: Progressing     Problem: Impaired Gas Exchange  Goal: Optimize oxygenation and ensure adequate ventilation  Description: INTERVENTIONS: Monitor for signs and symptoms of respiratory distress                - Elevate HOB or use high fowlers to promote lung expansion                - Administer oxygen as ordered to maintain adequate oxygenation                - Encourage use of IS to promote lung expansion and prevent PN                - Monitor ABGs to assess oxygenation status                - Monitor blood oxygen level to maintain adequate oxygenation                - Encourage cough and deep breathing exercises to promote lung expansion                - Monitor patient's mental status for increased confusion    8/1/2024 1139 by Elena Cerrato RN  Outcome: Adequate for Discharge  8/1/2024 0933 by Elena Cerrato RN  Outcome: Progressing     Problem: Excess Fluid Volume  Goal: Patient is able to achieve and maintain homeostasis  Description: INTERVENTIONS: Monitor for sign and symptoms of fluid overload  - Evaluate LE edema every shift  - Elevate LE to prevent dependent edema  - Apply KAJAL stockings as ordered   - Monitor ankle circumference daily  - Assess for jugular vein distention  - Evaluate provider orders for the CHF diuretic algorithm. Administer diuretics as ordered  - Weigh the patient daily at 0600 and report a weight gain of five pounds or more   - Strict intake and  output  - Monitor fluid intake and adhere to fluid restrictions  - Assess lung sounds every shift and as needed  - Monitor vital signs and lab values (CBC, chem, BUN, BNP)  - Measure and document urine output    8/1/2024 1139 by Elena Cerrato RN  Outcome: Adequate for Discharge  8/1/2024 0933 by Elena Cerrato RN  Outcome: Progressing     Problem: Activity Intolerance  Goal: Patient is able to perform activities within their limitations  Description: INTERVENTIONS:                       -   Alternate periods of activity with periods of rest                 -   Patients is able to maintain normal vitals heart rhythm during activity                 -   Gradually increase activity and exercise as patient can tolerate                 -   Monitor blood pressure and heart before and after exercise                  -   Monitor blood oxygen saturation during activity and apply oxygen as needed    8/1/2024 1139 by Elena Cerrato RN  Outcome: Adequate for Discharge  8/1/2024 0933 by Elena Cerrato RN  Outcome: Progressing     Problem: Knowledge Deficit  Goal: Patient is able to verbalize understanding of Heart Failure after education  Description: INTERVENTIONS:  - Educate the patient and family on signs and symptoms of HF  - Provide the patient with HF education and HF zone tool  - Educate on the importance of daily weight in the AM and reporting a weight gain               of 3 or more pounds to their primary care physician  - Monitor for SOB  - Maintain and sodium and fluid restriction  - Educate the patient on the importance of medications such as: diuretics, betablockers,               antiarrhythmics and their purpose, dose, route, side effects and labs               if they are needed    8/1/2024 1139 by Elena Cerrato RN  Outcome: Adequate for Discharge  8/1/2024 0933 by Elena Cerrato RN  Outcome: Progressing     Problem: Prexisting or High Potential for Compromised Skin Integrity  Goal: Skin integrity  is maintained or improved  Description: INTERVENTIONS:  - Identify patients at risk for skin breakdown  - Assess and monitor skin integrity  - Assess and monitor nutrition and hydration status  - Monitor labs   - Assess for incontinence   - Turn and reposition patient  - Assist with mobility/ambulation  - Relieve pressure over bony prominences  - Avoid friction and shearing  - Provide appropriate hygiene as needed including keeping skin clean and dry  - Evaluate need for skin moisturizer/barrier cream  - Collaborate with interdisciplinary team   - Patient/family teaching  - Consider wound care consult   8/1/2024 1139 by Elena Cerrato RN  Outcome: Adequate for Discharge  8/1/2024 0933 by Elena Cerrato RN  Outcome: Progressing

## 2024-08-01 NOTE — DISCHARGE SUMMARY
[facility]  Discharge- Marisol Otoole 1940, 83 y.o. female MRN: [id number]  Unit/Bed#: -01 Encounter: [id number]  Primary Care Provider: MABEL Hallman   Date and time admitted to hospital: 7/29/2024 11:59 AM    * Acute on chronic diastolic congestive heart failure (HCC)  Assessment & Plan  Present on admission history of chronic diastolic heart failure.  Patient is on Lasix 40 mg daily as needed at home.  Hospitalized due to volume overload and did well with IV Lasix 40 mg twice daily.  2D echo report noted with EF of 65%.    Currently appears comfortable on discharge.  Appears euvolemic.  O2 saturation 94% on room air.  Patient does have chronic home O2 at home however she uses as needed.  Transition to p.o. Lasix 20 mg twice daily.  Plan for discharge tomorrow.    Paroxysmal A-fib (HCC)  Assessment & Plan  Present on admission history of paroxysmal A-fib.  Currently not on rate controlling agent due to tendency of bradycardia.  Currently on Coumadin INR therapeutic 2.80  Patient does report taking a Coumadin of 5 mg 5 days a week, 2.5 mg 2 days a week.  Resuming low-dose Coumadin 2.5 milligram tonight.  Continue with outpatient INR monitoring as previously.  Patient is in agreement with the plan.    Chronic anticoagulation  Assessment & Plan  Patient on Coumadin presented with INR 6.6  Currently INR therapeutic 2.80.  Resume low-dose Coumadin 2.5 mg starting tonight.  Follow-up for INR on outpatient basis and adjust Coumadin as needed as she did previously.      Bradycardia  Assessment & Plan  Has history of sinus bradycardia  Heart rate noted to be approximately 45 on arrival  Appears to be sinus bradycardia      Ambulatory dysfunction  Assessment & Plan  Presented with generalized weakness  Patient is not interested in going to inpatient rehab.  Discharge home with VNA services.    JANICE (obstructive sleep apnea)  Assessment & Plan  Continue CPAP qhs    Acquired  hypothyroidism  Assessment & Plan  Continue home dose of levothyroxine.        Medical Problems       Resolved Problems  Date Reviewed: 8/1/2024   None       Discharging Physician / Practitioner: Javier Poon MD  PCP: MABEL Hallman  Admission Date:   Admission Orders (From admission, onward)       Ordered        07/29/24 1443  Inpatient Admission  Once                          Discharge Date: 08/01/24      Reason for Admission: Mild acute on chronic HFpEF.    Hospital Course:   Marisol Otoole is a 83 y.o. female patient with past medical history of CHF, history of TAVR bioprosthetic valve, A-fib currently on Coumadin, history of sinus bradycardia, depression anxiety, COPD, does have home oxygen however she only uses as needed, who originally presented to the hospital on 7/29/2024 due to lower extremity swelling due to mild acute on chronic HFpEF.  Did well with IV Lasix and currently reports feeling much better and eager to go home.  Previously patient was only taking Lasix 20 mg as needed however being discharged on p.o. Lasix 20 mg twice daily educated on low-salt, fluid restricted diet.  Patient was also noted to have supratherapeutic INR 6.66 on presentation which was treated with holding Coumadin and monitoring daily INR.  Currently INR 2.80.   No signs of active bleeding noted.  Recommend to resume home dose of Coumadin with outpatient INR monitoring and adjusting Coumadin as needed.  She does have chronic episodes of leukocytosis without clear etiology.  Currently she is afebrile, no signs of active overt infection noted.   remain stable of antibiotics.  Discussed with patient to follow-up with hematology oncology for workup.  Procalcitonin negative.  Also recommend to follow-up with PCP in 1 week to repeat CBC and BMP.  Current hemodynamically stable for discharge.  Patient is not interested in going to inpatient rehab.  Discharging home with VNA services.  No other events reported.  Refer to earlier  "notes for further clarification.          Please see above list of diagnoses and related plan for additional information.     Condition at Discharge: good    Discharge Day Visit / Exam:   Subjective: Seen during a.m. rounds.  Patient appears comfortable not in distress.  O2 saturation well on room air.  Currently she denies any other new complaints.  No other events reported.  Reports feeling much better and eager to go home.  Not interested in going to inpatient rehab. Eater to go home.     Vitals: Blood Pressure: (!) 118/46 (08/01/24 0836)  Pulse: (!) 54 (08/01/24 0836)  Temperature: 97.5 °F (36.4 °C) (08/01/24 0731)  Temp Source: Oral (07/29/24 2302)  Respirations: 18 (08/01/24 0731)  Height: 4' 6\" (137.2 cm) (07/30/24 1030)  Weight - Scale: 80.5 kg (177 lb 7.5 oz) (08/01/24 0553)  SpO2: 96 % (08/01/24 0836)  Exam:   Physical Exam  Constitutional:       General: She is not in acute distress.     Appearance: Normal appearance. She is obese. She is not ill-appearing, toxic-appearing or diaphoretic.   HENT:      Head: Normocephalic and atraumatic.   Eyes:      Pupils: Pupils are equal, round, and reactive to light.   Cardiovascular:      Rate and Rhythm: Bradycardia present.      Pulses: Normal pulses.   Pulmonary:      Effort: Pulmonary effort is normal. No respiratory distress.      Breath sounds: No wheezing or rales.      Comments: O2 sats 94 to 95% room air.  Abdominal:      General: Abdomen is flat. Bowel sounds are normal. There is no distension.      Palpations: Abdomen is soft.      Tenderness: There is no abdominal tenderness.   Musculoskeletal:      Right lower leg: No edema.      Left lower leg: No edema.   Neurological:      Mental Status: She is alert and oriented to person, place, and time.   Psychiatric:         Mood and Affect: Mood normal.         Behavior: Behavior normal.            Discharge instructions/Information to patient and family:   See after visit summary for information provided to " patient and family.      Provisions for Follow-Up Care:  See after visit summary for information related to follow-up care and any pertinent home health orders.      Mobility at time of Discharge:   Basic Mobility Inpatient Raw Score: 18  JH-HLM Goal: 6: Walk 10 steps or more  JH-HLM Achieved: 6: Walk 10 steps or more       Disposition:   Home with VNA Services (Reminder: Complete face to face encounter)    Planned Readmission:      Discharge Statement:  I spent 35 minutes discharging the patient. This time was spent on the day of discharge. I had direct contact with the patient on the day of discharge. Greater than 50% of the total time was spent examining patient, answering all patient questions, arranging and discussing plan of care with patient as well as directly providing post-discharge instructions.  Additional time then spent on discharge activities.    Discharge Medications:  See after visit summary for reconciled discharge medications provided to patient and/or family.      **Please Note: This note may have been constructed using a voice recognition system**

## 2024-08-02 ENCOUNTER — ANTICOAG VISIT (OUTPATIENT)
Dept: CARDIOLOGY CLINIC | Facility: CLINIC | Age: 84
End: 2024-08-02

## 2024-08-02 NOTE — PROGRESS NOTES
Pt was in house. Dc'd on Warfarin 5mg on Sun, 2.5mg the rest. Sent message to mobile lab to have inr drawn on 8/8

## 2024-08-02 NOTE — ASSESSMENT & PLAN NOTE
Present on admission history of paroxysmal A-fib.  Currently not on rate controlling agent due to tendency of bradycardia.  Currently on Coumadin and INR supratherapeutic 3.22  Resuming low-dose Coumadin milligram tonight.  Monitor INR tomorrow morning.

## 2024-08-05 ENCOUNTER — RA CDI HCC (OUTPATIENT)
Dept: OTHER | Facility: HOSPITAL | Age: 84
End: 2024-08-05

## 2024-08-05 ENCOUNTER — TELEPHONE (OUTPATIENT)
Age: 84
End: 2024-08-05

## 2024-08-05 NOTE — TELEPHONE ENCOUNTER
Patient was discharged from the hospital on 8/1 after admission with volume overload.     Discharged on lasix 20 mg BID.  Olmesartan 5 mg daily is still on her med list but she hasn't taken it since the day of her admission on 7/29.. Has not taken it since she returned home.   That day she called said her BP was 99 systolic.  She was advised to hold daily olmesartan until BP goes above 140 systolic.    She is unsure if she received olmesartan in the hospital     BP today is 107/50, yesterday 93/89.  Weight is 180 lbs today, she said it was 177 yesterday on same scale.  She has swelling in her right foot.     She is not dizzy or lightheaded, not short of breath.    I offered an ov today and tomorrow for a hospital follow up but patient said she could not do either day.  I scheduled her with [facility] on 8/26    Please advise on Olmesartan

## 2024-08-06 ENCOUNTER — HOSPITAL ENCOUNTER (INPATIENT)
Facility: HOSPITAL | Age: 84
LOS: 1 days | Discharge: HOME HEALTH | DRG: 683 | End: 2024-08-08
Attending: EMERGENCY MEDICINE | Admitting: STUDENT IN AN ORGANIZED HEALTH CARE EDUCATION/TRAINING PROGRAM
Payer: MEDICARE

## 2024-08-06 ENCOUNTER — APPOINTMENT (EMERGENCY)
Dept: RADIOLOGY | Facility: HOSPITAL | Age: 84
DRG: 683 | End: 2024-08-06
Payer: MEDICARE

## 2024-08-06 ENCOUNTER — APPOINTMENT (EMERGENCY)
Dept: CT IMAGING | Facility: HOSPITAL | Age: 84
DRG: 683 | End: 2024-08-06
Payer: MEDICARE

## 2024-08-06 DIAGNOSIS — I95.9 HYPOTENSION: ICD-10-CM

## 2024-08-06 DIAGNOSIS — M25.512 CHRONIC PAIN OF BOTH SHOULDERS: ICD-10-CM

## 2024-08-06 DIAGNOSIS — I50.31 ACUTE DIASTOLIC CONGESTIVE HEART FAILURE (HCC): ICD-10-CM

## 2024-08-06 DIAGNOSIS — L03.115 CELLULITIS OF RIGHT FOOT: ICD-10-CM

## 2024-08-06 DIAGNOSIS — M25.511 CHRONIC PAIN OF BOTH SHOULDERS: ICD-10-CM

## 2024-08-06 DIAGNOSIS — G89.29 CHRONIC PAIN OF BOTH SHOULDERS: ICD-10-CM

## 2024-08-06 DIAGNOSIS — R00.1 BRADYCARDIA: ICD-10-CM

## 2024-08-06 DIAGNOSIS — E83.42 HYPOMAGNESEMIA: ICD-10-CM

## 2024-08-06 DIAGNOSIS — Z79.01 LONG TERM (CURRENT) USE OF ANTICOAGULANTS: ICD-10-CM
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
## 2024-08-06 LAB
ANION GAP SERPL CALCULATED.3IONS-SCNC: 10 MMOL/L (ref 4–13)
ANION GAP SERPL CALCULATED.3IONS-SCNC: 8 MMOL/L (ref 4–13)
ATRIAL RATE: 46 BPM
ATRIAL RATE: 68 BPM
BACTERIA UR QL AUTO: NORMAL /HPF
BASOPHILS # BLD AUTO: 0.04 THOUSANDS/ÂΜL (ref 0–0.1)
BASOPHILS NFR BLD AUTO: 0 % (ref 0–1)
BILIRUB UR QL STRIP: NEGATIVE
BUN SERPL-MCNC: 55 MG/DL (ref 5–25)
BUN SERPL-MCNC: 59 MG/DL (ref 5–25)
CALCIUM SERPL-MCNC: 8.6 MG/DL (ref 8.4–10.2)
CALCIUM SERPL-MCNC: 9 MG/DL (ref 8.4–10.2)
CHLORIDE SERPL-SCNC: 96 MMOL/L (ref 96–108)
CHLORIDE SERPL-SCNC: 97 MMOL/L (ref 96–108)
CLARITY UR: CLEAR
CO2 SERPL-SCNC: 24 MMOL/L (ref 21–32)
CO2 SERPL-SCNC: 27 MMOL/L (ref 21–32)
COLOR UR: ABNORMAL
CREAT SERPL-MCNC: 1.4 MG/DL (ref 0.6–1.3)
CREAT SERPL-MCNC: 1.6 MG/DL (ref 0.6–1.3)
EOSINOPHIL # BLD AUTO: 0.47 THOUSAND/ÂΜL (ref 0–0.61)
EOSINOPHIL NFR BLD AUTO: 4 % (ref 0–6)
ERYTHROCYTE [DISTWIDTH] IN BLOOD BY AUTOMATED COUNT: 14.6 % (ref 11.6–15.1)
GFR SERPL CREATININE-BSD FRML MDRD: 29 ML/MIN/1.73SQ M
GFR SERPL CREATININE-BSD FRML MDRD: 34 ML/MIN/1.73SQ M
GLUCOSE SERPL-MCNC: 108 MG/DL (ref 65–140)
GLUCOSE SERPL-MCNC: 114 MG/DL (ref 65–140)
GLUCOSE SERPL-MCNC: 115 MG/DL (ref 65–140)
GLUCOSE UR STRIP-MCNC: NEGATIVE MG/DL
HCT VFR BLD AUTO: 32.4 % (ref 34.8–46.1)
HGB BLD-MCNC: 10.3 G/DL (ref 11.5–15.4)
HGB UR QL STRIP.AUTO: ABNORMAL
IMM GRANULOCYTES # BLD AUTO: 0.14 THOUSAND/UL (ref 0–0.2)
IMM GRANULOCYTES NFR BLD AUTO: 1 % (ref 0–2)
INR PPP: 5.9 (ref 0.85–1.19)
KETONES UR STRIP-MCNC: NEGATIVE MG/DL
LEUKOCYTE ESTERASE UR QL STRIP: NEGATIVE
LYMPHOCYTES # BLD AUTO: 1.08 THOUSANDS/ÂΜL (ref 0.6–4.47)
LYMPHOCYTES NFR BLD AUTO: 8 % (ref 14–44)
MAGNESIUM SERPL-MCNC: 1.6 MG/DL (ref 1.9–2.7)
MCH RBC QN AUTO: 27.4 PG (ref 26.8–34.3)
MCHC RBC AUTO-ENTMCNC: 31.8 G/DL (ref 31.4–37.4)
MCV RBC AUTO: 86 FL (ref 82–98)
MONOCYTES # BLD AUTO: 1.04 THOUSAND/ÂΜL (ref 0.17–1.22)
MONOCYTES NFR BLD AUTO: 8 % (ref 4–12)
NEUTROPHILS # BLD AUTO: 10.36 THOUSANDS/ÂΜL (ref 1.85–7.62)
NEUTS SEG NFR BLD AUTO: 79 % (ref 43–75)
NITRITE UR QL STRIP: NEGATIVE
NON-SQ EPI CELLS URNS QL MICRO: NORMAL /HPF
NRBC BLD AUTO-RTO: 0 /100 WBCS
P AXIS: 26 DEGREES
P AXIS: 53 DEGREES
PH UR STRIP.AUTO: 5.5 [PH]
PLATELET # BLD AUTO: 303 THOUSANDS/UL (ref 149–390)
PLATELET # BLD AUTO: 363 THOUSANDS/UL (ref 149–390)
PMV BLD AUTO: 9 FL (ref 8.9–12.7)
PMV BLD AUTO: 9.7 FL (ref 8.9–12.7)
POTASSIUM SERPL-SCNC: 4.3 MMOL/L (ref 3.5–5.3)
POTASSIUM SERPL-SCNC: 4.4 MMOL/L (ref 3.5–5.3)
PR INTERVAL: 248 MS
PR INTERVAL: 288 MS
PROT UR STRIP-MCNC: NEGATIVE MG/DL
PROTHROMBIN TIME: 52.8 SECONDS (ref 12.3–15)
QRS AXIS: -29 DEGREES
QRS AXIS: -39 DEGREES
QRSD INTERVAL: 86 MS
QRSD INTERVAL: 88 MS
QT INTERVAL: 422 MS
QT INTERVAL: 514 MS
QTC INTERVAL: 448 MS
QTC INTERVAL: 449 MS
RBC # BLD AUTO: 3.76 MILLION/UL (ref 3.81–5.12)
RBC #/AREA URNS AUTO: NORMAL /HPF
SODIUM SERPL-SCNC: 130 MMOL/L (ref 135–147)
SODIUM SERPL-SCNC: 132 MMOL/L (ref 135–147)
SP GR UR STRIP.AUTO: 1.01 (ref 1–1.03)
T WAVE AXIS: 37 DEGREES
T WAVE AXIS: 63 DEGREES
UROBILINOGEN UR STRIP-ACNC: <2 MG/DL
VENTRICULAR RATE: 46 BPM
VENTRICULAR RATE: 68 BPM
WBC # BLD AUTO: 13.13 THOUSAND/UL (ref 4.31–10.16)
WBC #/AREA URNS AUTO: NORMAL /HPF

## 2024-08-06 PROCEDURE — 72125 CT NECK SPINE W/O DYE: CPT

## 2024-08-06 PROCEDURE — 93005 ELECTROCARDIOGRAM TRACING: CPT

## 2024-08-06 PROCEDURE — 96368 THER/DIAG CONCURRENT INF: CPT

## 2024-08-06 PROCEDURE — 99223 1ST HOSP IP/OBS HIGH 75: CPT | Performed by: STUDENT IN AN ORGANIZED HEALTH CARE EDUCATION/TRAINING PROGRAM

## 2024-08-06 PROCEDURE — 73030 X-RAY EXAM OF SHOULDER: CPT

## 2024-08-06 PROCEDURE — 96367 TX/PROPH/DG ADDL SEQ IV INF: CPT

## 2024-08-06 PROCEDURE — 83735 ASSAY OF MAGNESIUM: CPT

## 2024-08-06 PROCEDURE — 85025 COMPLETE CBC W/AUTO DIFF WBC: CPT

## 2024-08-06 PROCEDURE — 96366 THER/PROPH/DIAG IV INF ADDON: CPT

## 2024-08-06 PROCEDURE — 73630 X-RAY EXAM OF FOOT: CPT

## 2024-08-06 PROCEDURE — 85610 PROTHROMBIN TIME: CPT | Performed by: STUDENT IN AN ORGANIZED HEALTH CARE EDUCATION/TRAINING PROGRAM

## 2024-08-06 PROCEDURE — 70450 CT HEAD/BRAIN W/O DYE: CPT

## 2024-08-06 PROCEDURE — 99285 EMERGENCY DEPT VISIT HI MDM: CPT | Performed by: EMERGENCY MEDICINE

## 2024-08-06 PROCEDURE — 87040 BLOOD CULTURE FOR BACTERIA: CPT | Performed by: STUDENT IN AN ORGANIZED HEALTH CARE EDUCATION/TRAINING PROGRAM

## 2024-08-06 PROCEDURE — 99285 EMERGENCY DEPT VISIT HI MDM: CPT

## 2024-08-06 PROCEDURE — 82948 REAGENT STRIP/BLOOD GLUCOSE: CPT

## 2024-08-06 PROCEDURE — 80048 BASIC METABOLIC PNL TOTAL CA: CPT

## 2024-08-06 PROCEDURE — 93010 ELECTROCARDIOGRAM REPORT: CPT | Performed by: INTERNAL MEDICINE

## 2024-08-06 PROCEDURE — 81001 URINALYSIS AUTO W/SCOPE: CPT | Performed by: STUDENT IN AN ORGANIZED HEALTH CARE EDUCATION/TRAINING PROGRAM

## 2024-08-06 PROCEDURE — 96365 THER/PROPH/DIAG IV INF INIT: CPT

## 2024-08-06 PROCEDURE — 85049 AUTOMATED PLATELET COUNT: CPT | Performed by: STUDENT IN AN ORGANIZED HEALTH CARE EDUCATION/TRAINING PROGRAM

## 2024-08-06 PROCEDURE — 71045 X-RAY EXAM CHEST 1 VIEW: CPT

## 2024-08-06 PROCEDURE — 36415 COLL VENOUS BLD VENIPUNCTURE: CPT

## 2024-08-06 RX ORDER — PRAVASTATIN SODIUM 80 MG/1
80 TABLET ORAL
Status: DISCONTINUED | OUTPATIENT
Start: 2024-08-06 | End: 2024-08-08 | Stop reason: HOSPADM

## 2024-08-06 RX ORDER — WARFARIN SODIUM 2.5 MG/1
2.5 TABLET ORAL
Status: DISCONTINUED | OUTPATIENT
Start: 2024-08-06 | End: 2024-08-06

## 2024-08-06 RX ORDER — CEPHALEXIN 250 MG/1
500 CAPSULE ORAL ONCE
Status: COMPLETED | OUTPATIENT
Start: 2024-08-06 | End: 2024-08-06

## 2024-08-06 RX ORDER — HEPARIN SODIUM 5000 [USP'U]/ML
5000 INJECTION, SOLUTION INTRAVENOUS; SUBCUTANEOUS EVERY 8 HOURS SCHEDULED
Status: DISCONTINUED | OUTPATIENT
Start: 2024-08-06 | End: 2024-08-08 | Stop reason: HOSPADM

## 2024-08-06 RX ORDER — ACETAMINOPHEN 10 MG/ML
1000 INJECTION, SOLUTION INTRAVENOUS ONCE
Status: COMPLETED | OUTPATIENT
Start: 2024-08-06 | End: 2024-08-06

## 2024-08-06 RX ORDER — HEPARIN SODIUM 5000 [USP'U]/ML
5000 INJECTION, SOLUTION INTRAVENOUS; SUBCUTANEOUS EVERY 8 HOURS SCHEDULED
Status: DISCONTINUED | OUTPATIENT
Start: 2024-08-06 | End: 2024-08-06

## 2024-08-06 RX ORDER — SODIUM CHLORIDE, SODIUM GLUCONATE, SODIUM ACETATE, POTASSIUM CHLORIDE, MAGNESIUM CHLORIDE, SODIUM PHOSPHATE, DIBASIC, AND POTASSIUM PHOSPHATE .53; .5; .37; .037; .03; .012; .00082 G/100ML; G/100ML; G/100ML; G/100ML; G/100ML; G/100ML; G/100ML
75 INJECTION, SOLUTION INTRAVENOUS CONTINUOUS
Status: DISCONTINUED | OUTPATIENT
Start: 2024-08-06 | End: 2024-08-07

## 2024-08-06 RX ORDER — SERTRALINE HYDROCHLORIDE 100 MG/1
100 TABLET, FILM COATED ORAL DAILY
Status: DISCONTINUED | OUTPATIENT
Start: 2024-08-06 | End: 2024-08-08 | Stop reason: HOSPADM

## 2024-08-06 RX ORDER — PANTOPRAZOLE SODIUM 40 MG/1
40 TABLET, DELAYED RELEASE ORAL
Status: DISCONTINUED | OUTPATIENT
Start: 2024-08-07 | End: 2024-08-08 | Stop reason: HOSPADM

## 2024-08-06 RX ORDER — OXYBUTYNIN CHLORIDE 5 MG/1
5 TABLET, EXTENDED RELEASE ORAL DAILY
Status: DISCONTINUED | OUTPATIENT
Start: 2024-08-06 | End: 2024-08-08 | Stop reason: HOSPADM

## 2024-08-06 RX ORDER — MAGNESIUM SULFATE HEPTAHYDRATE 40 MG/ML
4 INJECTION, SOLUTION INTRAVENOUS ONCE
Status: COMPLETED | OUTPATIENT
Start: 2024-08-06 | End: 2024-08-06

## 2024-08-06 RX ORDER — SODIUM CHLORIDE, SODIUM GLUCONATE, SODIUM ACETATE, POTASSIUM CHLORIDE, MAGNESIUM CHLORIDE, SODIUM PHOSPHATE, DIBASIC, AND POTASSIUM PHOSPHATE .53; .5; .37; .037; .03; .012; .00082 G/100ML; G/100ML; G/100ML; G/100ML; G/100ML; G/100ML; G/100ML
1000 INJECTION, SOLUTION INTRAVENOUS ONCE
Status: COMPLETED | OUTPATIENT
Start: 2024-08-06 | End: 2024-08-06

## 2024-08-06 RX ORDER — PREGABALIN 25 MG/1
25 CAPSULE ORAL
Status: DISCONTINUED | OUTPATIENT
Start: 2024-08-06 | End: 2024-08-08 | Stop reason: HOSPADM

## 2024-08-06 RX ORDER — LEVOTHYROXINE SODIUM 0.05 MG/1
50 TABLET ORAL DAILY
Status: DISCONTINUED | OUTPATIENT
Start: 2024-08-06 | End: 2024-08-08 | Stop reason: HOSPADM

## 2024-08-06 RX ADMIN — SODIUM CHLORIDE, SODIUM GLUCONATE, SODIUM ACETATE, POTASSIUM CHLORIDE, MAGNESIUM CHLORIDE, SODIUM PHOSPHATE, DIBASIC, AND POTASSIUM PHOSPHATE 75 ML/HR: .53; .5; .37; .037; .03; .012; .00082 INJECTION, SOLUTION INTRAVENOUS at 16:46

## 2024-08-06 RX ADMIN — SERTRALINE HYDROCHLORIDE 100 MG: 50 TABLET ORAL at 15:27

## 2024-08-06 RX ADMIN — SODIUM CHLORIDE, SODIUM GLUCONATE, SODIUM ACETATE, POTASSIUM CHLORIDE, MAGNESIUM CHLORIDE, SODIUM PHOSPHATE, DIBASIC, AND POTASSIUM PHOSPHATE 1000 ML: .53; .5; .37; .037; .03; .012; .00082 INJECTION, SOLUTION INTRAVENOUS at 10:21

## 2024-08-06 RX ADMIN — PREGABALIN 25 MG: 25 CAPSULE ORAL at 22:45

## 2024-08-06 RX ADMIN — OXYBUTYNIN CHLORIDE 5 MG: 5 TABLET, EXTENDED RELEASE ORAL at 15:27

## 2024-08-06 RX ADMIN — MAGNESIUM SULFATE HEPTAHYDRATE 4 G: 40 INJECTION, SOLUTION INTRAVENOUS at 10:21

## 2024-08-06 RX ADMIN — HEPARIN SODIUM 5000 UNITS: 5000 INJECTION INTRAVENOUS; SUBCUTANEOUS at 22:45

## 2024-08-06 RX ADMIN — ACETAMINOPHEN 1000 MG: 10 INJECTION INTRAVENOUS at 09:36

## 2024-08-06 RX ADMIN — CEPHALEXIN 500 MG: 250 CAPSULE ORAL at 10:21

## 2024-08-06 RX ADMIN — PRAVASTATIN SODIUM 80 MG: 80 TABLET ORAL at 16:46

## 2024-08-06 RX ADMIN — LEVOTHYROXINE SODIUM 50 MCG: 0.05 TABLET ORAL at 15:28

## 2024-08-06 RX ADMIN — ASPIRIN 81 MG: 81 TABLET, COATED ORAL at 15:27

## 2024-08-06 RX ADMIN — SODIUM CHLORIDE, SODIUM GLUCONATE, SODIUM ACETATE, POTASSIUM CHLORIDE, MAGNESIUM CHLORIDE, SODIUM PHOSPHATE, DIBASIC, AND POTASSIUM PHOSPHATE 1000 ML: .53; .5; .37; .037; .03; .012; .00082 INJECTION, SOLUTION INTRAVENOUS at 14:08

## 2024-08-06 NOTE — ASSESSMENT & PLAN NOTE
Known history  Unable to tolerate rate/rhythm controlling agents due to underlying bradycardia  Currently bradycardic, 40-50s.  Continue to monitor on telemetry  Typically takes coumadin - 2.5 mg daily Monday-Saturday and 5 mg on Sunday.  Supratherapeutic INR on admission, 5.90  Check INR today; goal INR 2-3

## 2024-08-06 NOTE — H&P
"Critical access hospital  H&P  Name: Marisol Otoole 83 y.o. female I MRN: [id number]  Unit/Bed#: 2 E 259-01 I Date of Admission: 8/6/2024   Date of Service: 8/7/2024 I Hospital Day: 0      Assessment & Plan   ERIN: Baseline creatinine is 0.73-0.9 current is 1.60 trended down to 1.4 after fluids bolus.  Continue gentle IV fluids, I's and O's, daily weight avoid nephrotoxic medication keep holding losartan and Lasix      Hyponatremia: Improved with IV fluids possibly due to hypovolemic hyponatremia     Hypomagnesemia: 2 g of IV magnesium sulfate given     Leukocytosis: Patient has chronic leukocytosis.  Today is little pale higher than prior 1.  Chest x-ray did not show any infiltrate.  UA negative for UTI.  Will follow blood cultures.  Hold on IV antibiotics as patient does not have any other SIRS criteria.     Fall: Looks like mechanical.  But since patient admitted with soft blood pressure.  Will check orthostatics.  Last echo done a week ago which showed moderate to severe TR with moderate stenosis of mitral valve and TAVR bioprosthetic valve.  Will keep her on telemetry, PT OT.     Chronic bradycardia:  Patient has baseline bradycardia 40-55 EKG showed first-degree AV block.  Continue telemetry  TSH normal    Supratherapeutic INR: Hold Coumadin     Lactic acidosis: Resolved with IV fluids       VTE Pharmacologic Prophylaxis:   Moderate Risk (Score 3-4) - Pharmacological DVT Prophylaxis Ordered: heparin.  Code Status: Level 3 - DNAR and DNI   Discussion with Patient/Family: pt    Anticipated Length of Stay: Patient will be admitted on an inpatient basis with an anticipated length of stay of greater than 2 midnights secondary to acute ERIN.    Chief Complaint:   Chief Complaint   Patient presents with    Fall     Pt presents from home following fall.  Per pt she was using making her way to the bathroom and while attempting to sit on the toilet her \"legs gave out\", unknown head strike but pt reports possible " d/t her head being up against the bathtub. Reports no LOC, but is on coumadin.        History of Present Illness:  Marisol Otoole is a 83 y.o. female PMHx of HLD, Hypothyrodism, CHF, mild COPD, CAD, HTN, atrioventricular block recently discharged from the hospital with CHF exacerbation started on Lasix.  Patient came to ER after a fall.  She reported she was walking to the bathroom when she fell.  She denied any lightheadedness, dizziness, seizure-like activity.  She also denied from chest pain.  She reported she could not sleep last night     She denied complaint of fever associated rigors and chills, nausea, vomiting, diarrhea.       Review of Systems   Constitutional:  Positive for fatigue. Negative for chills and fever.   Respiratory:  Negative for cough and shortness of breath.    Cardiovascular:  Negative for chest pain and palpitations.   Gastrointestinal:  Negative for diarrhea and vomiting.   Neurological:  Positive for weakness.          Past Medical and Surgical History:   Past Medical History:   Diagnosis Date    Anemia 08/22/2018    Anxiety     Arthritis     AVB (atrioventricular block)     first degree    Cataract     CHF (congestive heart failure) (HCC)     COPD, mild (HCC)     Coronary artery disease     Dislocation of right shoulder joint     Frequent UTI     GERD (gastroesophageal reflux disease)     H/O: pneumonia     Heme positive stool     Hyperlipidemia     Hypertension     Hypothyroidism     Morbid obesity with BMI of 50.0-59.9, adult (HCC)     Obesity, morbid (HCC) 08/22/2018    JANICE on CPAP     Pulmonary hypertension (HCC) 08/22/2018    Severe aortic stenosis     Simple goiter     Skin cyst     within the armpits, right    Wears glasses        Past Surgical History:   Procedure Laterality Date    BREAST BIOPSY      CARDIAC CATHETERIZATION      CARPAL TUNNEL RELEASE Bilateral     CHOLECYSTECTOMY      DILATION AND CURETTAGE OF UTERUS      HYSTEROSCOPY      MASTOID SURGERY      ND COLONOSCOPY FLX  DX W/COLLJ SPEC WHEN PFRMD N/A 9/6/2018    Procedure: COLONOSCOPY;  Surgeon: Shree Sosa III, MD;  Location: [facility];  Service: Gastroenterology    MN ECHO TRANSESOPHAG R-T 2D W/PRB IMG ACQUISJ I&R N/A 10/9/2018    Procedure: INTRA-OP TRANSESOPHAGEAL ECHOCARDIOGRAM (GARRISON);  Surgeon: Kushal Camarena DO;  Location: [facility];  Service: Cardiac Surgery    MN ESOPHAGOGASTRODUODENOSCOPY TRANSORAL DIAGNOSTIC N/A 8/31/2018    Procedure: ESOPHAGOGASTRODUODENOSCOPY (EGD);  Surgeon: Shree Sosa III, MD;  Location: [facility];  Service: Gastroenterology    MN REPLACE AORTIC VALVE OPENFEMORAL ARTERY APPROACH N/A 10/9/2018    Procedure: REPLACEMENT AORTIC VALVE TRANSCATHETER (TAVR) TRANSFEMORAL W/ 23 MM MENDOZA NOE S3 VALVE (ACCESS OF LEFT);  Surgeon: Kushal Camarena DO;  Location: [facility];  Service: Cardiac Surgery    TOTAL HIP ARTHROPLASTY Left 2007    TOTAL KNEE ARTHROPLASTY Bilateral        Meds/Allergies:  Prior to Admission medications    Medication Sig Start Date End Date Taking? Authorizing Provider   acetaminophen (TYLENOL) 500 mg tablet Take 500 mg by mouth every 6 (six) hours as needed    Historical Provider, MD   aspirin (ECOTRIN LOW STRENGTH) 81 mg EC tablet Take 1 tablet (81 mg total) by mouth daily 10/11/18   Fatou Schmitz PA-C   b complex vitamins capsule Take 1 capsule by mouth 2 (two) times a day      Historical Provider, MD   Blood Glucose Monitoring Suppl (OneTouch Verio Reflect) w/Device KIT Check blood sugars once daily. Please substitute with appropriate alternative as covered by patient's insurance. Dx: E11.65 6/28/24   MABEL Donahue   Calcium Carb-Cholecalciferol (CALCIUM 600 + D PO) Take 1 tablet by mouth 2 (two) times a day    Historical Provider, MD   Cranberry 250 MG TABS Take by mouth    Historical Provider, MD   ferrous sulfate 324 (65 Fe) mg Take 1 tablet every other day. 5/21/24   MABEL Hallman   furosemide (LASIX) 20 mg tablet Take 1 tablet (20 mg total) by  mouth 2 (two) times a day 8/1/24   Javier FORDE MD   glucose blood (OneTouch Verio) test strip Check blood sugars once daily. Please substitute with appropriate alternative as covered by patient's insurance. Dx: E11.65  Patient not taking: Reported on 7/29/2024 6/28/24   MABEL Donahue   glucose blood test strip Use 1 each in the morning Use as instructed  Patient not taking: Reported on 7/29/2024 6/28/24   MABEL Donahue   hydrocortisone 2.5 % cream Apply topically 2 (two) times a day 1/10/24   MABEL Donahue   Lancets (onetouch ultrasoft) lancets Use in the morning Use as instructed  Patient not taking: Reported on 7/29/2024 6/28/24   MABEL Donahue   levothyroxine 50 mcg tablet TAKE 1 TABLET BY MOUTH EVERY DAY 7/5/24   MABEL Hallman   ofloxacin (OCUFLOX) 0.3 % ophthalmic solution 5 drops twice daily to left ear x 10 days.  Patient not taking: Reported on 7/16/2024 11/17/23   Javier Zaragoza PA-C   olmesartan (BENICAR) 5 mg tablet TAKE 2 TABLETS BY MOUTH EVERY DAY 4/8/24   Bro Esteves MD   Omega-3 Fatty Acids (Fish Oil) 300 MG CAPS Take by mouth  Patient not taking: Reported on 7/29/2024    Alma Bryant MD   omeprazole (PriLOSEC) 40 MG capsule TAKE 1 CAPSULE BY MOUTH TWICE A DAY 7/3/24   AMBEL Hallman   OneTouch Delica Lancets 33G MISC Check blood sugars once daily. Please substitute with appropriate alternative as covered by patient's insurance. Dx: E11.65  Patient not taking: Reported on 7/29/2024 6/28/24   MABEL Donahue   oxybutynin (DITROPAN-XL) 5 mg 24 hr tablet Take 1 tablet (5 mg total) by mouth daily 7/1/24   MABEL Hallman   oxyCODONE (Roxicodone) 5 immediate release tablet Take 0.5 tablets (2.5 mg total) by mouth 2 (two) times a day as needed for moderate pain Max Daily Amount: 5 mg  Patient not taking: Reported on 7/16/2024 4/22/24   Juan Montero MD   oxygen gas Inhale 2 L/min continuous. Indications: copd    Historical Provider, MD    pregabalin (LYRICA) 25 mg capsule Take 1 capsule (25 mg total) by mouth daily at bedtime for 3 days, THEN 1 capsule (25 mg total) 2 (two) times a day for 27 days. 7/16/24 8/15/24  MABEL Oliveira   sertraline (ZOLOFT) 100 mg tablet TAKE 1 TABLET BY MOUTH EVERY DAY 3/26/24   MABEL Hallman   simvastatin (ZOCOR) 40 mg tablet TAKE 1 TABLET BY MOUTH EVERYDAY AT BEDTIME 7/5/24   MABEL Donahue   triamcinolone (KENALOG) 0.1 % cream Apply topically 2 (two) times a day  Patient not taking: Reported on 7/29/2024 1/25/24   MABEL Hallman   Turmeric (QC Tumeric Complex) 500 MG CAPS Take by mouth  Patient not taking: Reported on 7/29/2024    Historical Provider, MD   warfarin (Coumadin) 2.5 mg tablet Take 1 tablet (2.5mg) Mon-Sat. Take 2 tablets (5mg) on Sun or as directed 6/7/24   Bro Esteves MD   cyclobenzaprine (FLEXERIL) 5 mg tablet Take 1 tablet (5 mg total) by mouth 2 (two) times a day as needed for muscle spasms 8/30/22 9/5/22  MABEL Hallman   meloxicam (Mobic) 15 mg tablet Take 1 tablet (15 mg total) by mouth daily 8/30/22 9/5/22  MABEL Hallman     I have reviewed home medications with patient personally.    Allergies:   Allergies   Allergen Reactions    Latex Rash    Neosporin [Neomycin-Bacitracin Zn-Polymyx] Rash and Other (See Comments)     hives per [facility] order       Social History:  Marital Status: Single   Patient Pre-hospital Living Situation: Home  Patient Pre-hospital Level of Mobility: walks with walker  Patient Pre-hospital Diet Restrictions: none  Substance Use History:   Social History     Substance and Sexual Activity   Alcohol Use Not Currently     Social History     Tobacco Use   Smoking Status Never   Smokeless Tobacco Never     Social History     Substance and Sexual Activity   Drug Use Never       Family History:  Family History   Problem Relation Age of Onset    Diabetes Mother     Stroke Mother     Cancer Father     Lung cancer Father     Diabetes Sister     Heart  disease Sister     Hypertension Sister     Coronary artery disease Family     Diabetes Family     Hypertension Family     Cancer Family     Stroke Family     Thyroid disease Neg Hx        Physical Exam:     Vitals:   Blood Pressure: 108/74 (08/07/24 1100)  Pulse: (!) 46 (08/07/24 0711)  Temperature: 98.7 °F (37.1 °C) (08/07/24 1100)  Temp Source: Oral (08/07/24 1100)  Respirations: 18 (08/07/24 1100)  Weight - Scale: 84.5 kg (186 lb 4.6 oz) (08/07/24 0747)  SpO2: 95 % (08/07/24 0711)    Constitutional: No acute distress  HEENT: Negative pallor or icterus  CVS: S1 plus S2  Respiratory: Normal vascular breathe without crackles and wheeze  Gastroenterology: Soft nontender without any palpable mass  Skin: No bruises or ecchymosis  Neurology: Alert oriented to time place and person but intermittent drowsy      Additional Data:     Lab Results:  Results from last 7 days   Lab Units 08/07/24  0518 08/06/24  1428 08/06/24  0924   WBC Thousand/uL 12.31*  --  13.13*   HEMOGLOBIN g/dL 9.2*  --  10.3*   HEMATOCRIT % 28.4*  --  32.4*   PLATELETS Thousands/uL 331   < > 363   SEGS PCT %  --   --  79*   LYMPHO PCT %  --   --  8*   MONO PCT %  --   --  8   EOS PCT %  --   --  4    < > = values in this interval not displayed.     Results from last 7 days   Lab Units 08/07/24  0518   SODIUM mmol/L 137   POTASSIUM mmol/L 4.2   CHLORIDE mmol/L 103   CO2 mmol/L 27   BUN mg/dL 39*   CREATININE mg/dL 0.88   ANION GAP mmol/L 7   CALCIUM mg/dL 8.4   GLUCOSE RANDOM mg/dL 106     Results from last 7 days   Lab Units 08/07/24  0914   INR  5.17*     Results from last 7 days   Lab Units 08/06/24  0822   POC GLUCOSE mg/dl 114         Results from last 7 days   Lab Units 08/07/24  0518 08/01/24  0515   PROCALCITONIN ng/ml 0.12 0.07       Imaging Results Reviewed as noted below  XR foot 3+ views RIGHT   Final Result by Christopher Cohen MD (08/06 1255)      No acute osseous abnormality.         Computerized Assisted Algorithm (CAA) may have been used to  analyze all applicable images.         Workstation performed: [id number]         XR chest 1 view portable   Final Result by Milagro Mcdaniels MD (08/06 1205)      Skeleton normal for age.  No acute displaced fractures.      Low lung volumes producing vascular crowding. Question mild pulmonary venous congestion.            Workstation performed: [id number]         XR shoulder 2+ views RIGHT   Final Result by Christopher Cohen MD (08/06 1256)      No acute osseous abnormality.         Computerized Assisted Algorithm (CAA) may have been used to analyze all applicable images.         Workstation performed: [id number]         XR shoulder 2+ views LEFT   Final Result by Christopher Cohen MD (08/06 1257)      No acute osseous abnormality.         Computerized Assisted Algorithm (CAA) may have been used to analyze all applicable images.         Workstation performed: [id number]         CT head without contrast   Final Result by Cedric Tavares MD (08/06 0859)      No acute intracranial abnormality.                  Workstation performed: [id number]         CT spine cervical without contrast   Final Result by Cedric Tavares MD (08/06 0902)      No cervical spine fracture or traumatic malalignment.                  Workstation performed: [id number]             XR shoulder 2+ views LEFT    Result Date: 8/6/2024  Impression: No acute osseous abnormality. Computerized Assisted Algorithm (CAA) may have been used to analyze all applicable images. Workstation performed: [id number]     XR shoulder 2+ views RIGHT    Result Date: 8/6/2024  Impression: No acute osseous abnormality. Computerized Assisted Algorithm (CAA) may have been used to analyze all applicable images. Workstation performed: [id number]     XR foot 3+ views RIGHT    Result Date: 8/6/2024  Impression: No acute osseous abnormality. Computerized Assisted Algorithm (CAA) may have been used to analyze all applicable images. Workstation performed: [id number]     XR chest 1 view  portable    Result Date: 8/6/2024  Impression: Skeleton normal for age.  No acute displaced fractures. Low lung volumes producing vascular crowding. Question mild pulmonary venous congestion. Workstation performed: [id number]     CT spine cervical without contrast    Result Date: 8/6/2024  Impression: No cervical spine fracture or traumatic malalignment. Workstation performed: [id number]     CT head without contrast    Result Date: 8/6/2024  Impression: No acute intracranial abnormality. Workstation performed: [id number]     XR chest 1 view portable    Result Date: 8/6/2024  Impression Skeleton normal for age.  No acute displaced fractures. Low lung volumes producing vascular crowding. Question mild pulmonary venous congestion. Workstation performed: [id number]          EKG and Other Studies Reviewed on Admission:   EKG: Marked sinus bradycardia with pulse degree AV block    Recent Labs     08/06/24  1411   VENTRATE 44       ** Please Note: This note has been constructed using a voice recognition system. **

## 2024-08-06 NOTE — QUICK NOTE
84 years old female recently discharged from the hospital with CHF exacerbation started on Lasix.  Patient came to ER after a fall.  She reported she was walking to the bathroom when she fell.  She denied any lightheadedness, dizziness, seizure-like activity.  She also denied from chest pain.  She reported she could not sleep last night    She denied complaint of fever associated rigors and chills, nausea, vomiting, diarrhea.      Constitutional: No acute distress  HEENT: Negative pallor or icterus  CVS: S1 plus S2  Respiratory: Normal vascular breathe without crackles and wheeze  Gastroenterology: Soft nontender without any palpable mass  Skin: No bruises or ecchymosis  Neurology: Alert oriented to time place and person but intermittent drowsy        Today's update and plan  ERIN: Baseline creatinine is 0.73-0.9 current is 1.60 trended down to 1.4 after fluids bolus.  Continue gentle IV fluids, I's and O's, daily weight avoid nephrotoxic medication keep holding losartan and Lasix      Hyponatremia: Improved with IV fluids possibly due to hypovolemic hyponatremia      Hypomagnesemia: 2 g of IV magnesium sulfate given      Leukocytosis: Patient has chronic leukocytosis.  Today is little pale higher than prior 1.  Chest x-ray did not show any infiltrate.  UA negative for UTI.  Will follow blood cultures.  Hold on IV antibiotics as patient does not have any other SIRS criteria.      Fall: Looks like mechanical.  But since patient admitted with soft blood pressure.  Will check orthostatics.  Last echo done a week ago which showed moderate to severe TR with moderate stenosis of mitral valve and TAVR bioprosthetic valve.  Will keep her on telemetry, PT OT.    Chronic bradycardia:  Patient has baseline bradycardia 40-55 EKG showed first-degree AV block.  Continue telemetry  TSH normal      Lactic acidosis: Resolved with IV fluids      Patient reported that she is DNR/DNI.    Porter Carter

## 2024-08-06 NOTE — ED ATTENDING ATTESTATION
8/6/2024  IDeanna MD, saw and evaluated the patient. I have discussed the patient with the resident/non-physician practitioner and agree with the resident's/non-physician practitioner's findings, Plan of Care, and MDM as documented in the resident's/non-physician practitioner's note, except where noted. All available labs and Radiology studies were reviewed.  I was present for key portions of any procedure(s) performed by the resident/non-physician practitioner and I was immediately available to provide assistance.       At this point I agree with the current assessment done in the Emergency Department.  I have conducted an independent evaluation of this patient a history and physical is as follows:    ED Course     83-year-old female presents after mechanical fall, missed the toilet and fell onto her buttocks.  Complaining of bilateral shoulder pain, neck pain and head injury.  She is on anticoagulants.  Trauma evaluation was called.  General: VSS, NAD, awake, alert. Well-nourished, well-developed. Appears stated age.   Speaking normally in full sentences.   Head: Normocephalic, atraumatic, nontender.  Eyes: PERRL, EOM-I. No diplopia.   No hyphema.   No subconjunctival hemorrhages.  Symmetrical lids.   ENT: Atraumatic external nose and ears.    MMM  No malocclusion. No stridor. Normal phonation. No drooling. Normal swallowing.   Neck: Symmetric, trachea midline. No JVD.  CV: RRR. +S1/S2  No murmurs or gallops  Peripheral pulses +2 throughout. No chest wall tenderness.   Lungs:   Unlabored No retractions  CTAB, lungs sounds equal bilateral.   No tachypnea.   Abd: +BS, soft, NT/ND.   MSK:   FROM, tenderness to palpation bilateral shoulders  Back:   No rashes  Skin: Dry, intact.   Neuro: AAOx3, GCS 15, CN II-XII grossly intact.   Motor grossly intact.  Psychiatric/Behavioral: Appropriate mood and affect   Exam: deferred       No signs of acute traumatic injury on imaging. Patient with ERIN, does not want to  be admitted, would like to go home, will hydrate  Critical Care Time  Procedures

## 2024-08-06 NOTE — ASSESSMENT & PLAN NOTE
Present on admission, evidenced by creatinine of 1.60  Likely secondary to dehydration, poor oral intake.  Recent admission for CHF exacerbation.  Noted to be hypotensive on admission as well.  Received 2 L IVF Boluses + 12 hours of IVF  Creatinine has improved to baseline, 0.88  Will hold off on home Lasix today.

## 2024-08-06 NOTE — ED PROVIDER NOTES
"History  Chief Complaint   Patient presents with    Fall     Pt presents from home following fall.  Per pt she was using making her way to the bathroom and while attempting to sit on the toilet her \"legs gave out\", unknown head strike but pt reports possible d/t her head being up against the bathtub. Reports no LOC, but is on coumadin.      Marisol is an 83-year-old female with PMH of PAF on Coumadin, CHFpEF, CAD, HTN, HLD, pulm HTN, R shoulder dislocation, JANICE, COPD, GERD, Anxiety, presenting to the ED s/p fall around 7:30 this morning (1 hour ago).  Patient has complaints of bilateral shoulder pain.  Patient reports walking to the bathroom and trying to sit on the toilet and missed, falling on the ground, + head strike, - loss of consciousness.  Patient had a medical alert necklace on, and was able to call EMS promptly when she had difficulty getting up herself.  Patient was recently admitted for CHF exacerbation, discharged 5 days ago with new prescription for Lasix 20 mg twice daily.  Patient reports taking this medicine as prescribed, and she states she has been feeling well since discharge home.  Patient denies any chest pain, shortness of breath, palpitations, abdominal pain, nausea//vomiting, vision changes, headache, back pain, unilateral weakness, numbness/tingling.  Patient states she was feeling well prior to the mechanical fall.  Patient reports using CPAP at nighttime for JANICE, and during the daytime as needed using 2 L nasal cannula.  Patient has known allergies to latex, and Neosporin, both causing a rash.      Fall  Associated symptoms: no abdominal pain, no chest pain, no headaches, no nausea and no vomiting        Prior to Admission Medications   Prescriptions Last Dose Informant Patient Reported? Taking?   Blood Glucose Monitoring Suppl (OneTouch Verio Reflect) w/Device KIT  Self No No   Sig: Check blood sugars once daily. Please substitute with appropriate alternative as covered by patient's " insurance. Dx: E11.65   Calcium Carb-Cholecalciferol (CALCIUM 600 + D PO)  Self Yes No   Sig: Take 1 tablet by mouth 2 (two) times a day   Cranberry 250 MG TABS  Self Yes No   Sig: Take by mouth   Lancets (onetouch ultrasoft) lancets  Self No No   Sig: Use in the morning Use as instructed   Patient not taking: Reported on 2024   Omega-3 Fatty Acids (Fish Oil) 300 MG CAPS  Self Yes No   Sig: Take by mouth   Patient not taking: Reported on 2024   OneTouch Delica Lancets 33G MISC  Self No No   Sig: Check blood sugars once daily. Please substitute with appropriate alternative as covered by patient's insurance. Dx: E11.65   Patient not taking: Reported on 2024   Turmeric (QC Tumeric Complex) 500 MG CAPS  Self Yes No   Sig: Take by mouth   Patient not taking: Reported on 2024   acetaminophen (TYLENOL) 500 mg tablet  Self Yes No   Sig: Take 500 mg by mouth every 6 (six) hours as needed   aspirin (ECOTRIN LOW STRENGTH) 81 mg EC tablet  Self No No   Sig: Take 1 tablet (81 mg total) by mouth daily   b complex vitamins capsule  Self Yes No   Sig: Take 1 capsule by mouth 2 (two) times a day     ferrous sulfate 324 (65 Fe) mg  Self No No   Sig: Take 1 tablet every other day.   furosemide (LASIX) 20 mg tablet   No No   Sig: Take 1 tablet (20 mg total) by mouth 2 (two) times a day   glucose blood (OneTouch Verio) test strip  Self No No   Sig: Check blood sugars once daily. Please substitute with appropriate alternative as covered by patient's insurance. Dx: E11.65   Patient not taking: Reported on 2024   glucose blood test strip  Self No No   Sig: Use 1 each in the morning Use as instructed   Patient not taking: Reported on 2024   hydrocortisone 2.5 % cream  Self No No   Sig: Apply topically 2 (two) times a day   levothyroxine 50 mcg tablet  Self No No   Sig: TAKE 1 TABLET BY MOUTH EVERY DAY   ofloxacin (OCUFLOX) 0.3 % ophthalmic solution  Self No No   Si drops twice daily to left ear x 10 days.    Patient not taking: Reported on 7/16/2024   olmesartan (BENICAR) 5 mg tablet  Self No No   Sig: TAKE 2 TABLETS BY MOUTH EVERY DAY   omeprazole (PriLOSEC) 40 MG capsule  Self No No   Sig: TAKE 1 CAPSULE BY MOUTH TWICE A DAY   oxyCODONE (Roxicodone) 5 immediate release tablet  Self No No   Sig: Take 0.5 tablets (2.5 mg total) by mouth 2 (two) times a day as needed for moderate pain Max Daily Amount: 5 mg   Patient not taking: Reported on 7/16/2024   oxybutynin (DITROPAN-XL) 5 mg 24 hr tablet  Self No No   Sig: Take 1 tablet (5 mg total) by mouth daily   oxygen gas  Self Yes No   Sig: Inhale 2 L/min continuous. Indications: copd   pregabalin (LYRICA) 25 mg capsule   No No   Sig: Take 1 capsule (25 mg total) by mouth daily at bedtime for 3 days, THEN 1 capsule (25 mg total) 2 (two) times a day for 27 days.   sertraline (ZOLOFT) 100 mg tablet  Self No No   Sig: TAKE 1 TABLET BY MOUTH EVERY DAY   simvastatin (ZOCOR) 40 mg tablet  Self No No   Sig: TAKE 1 TABLET BY MOUTH EVERYDAY AT BEDTIME   triamcinolone (KENALOG) 0.1 % cream  Self No No   Sig: Apply topically 2 (two) times a day   Patient not taking: Reported on 7/29/2024   warfarin (Coumadin) 2.5 mg tablet  Self No No   Sig: Take 1 tablet (2.5mg) Mon-Sat. Take 2 tablets (5mg) on Sun or as directed      Facility-Administered Medications: None       Past Medical History:   Diagnosis Date    Anemia 08/22/2018    Anxiety     Arthritis     AVB (atrioventricular block)     first degree    Cataract     CHF (congestive heart failure) ([facility])     COPD, mild ([facility])     Coronary artery disease     Dislocation of right shoulder joint     Frequent UTI     GERD (gastroesophageal reflux disease)     H/O: pneumonia     Heme positive stool     Hyperlipidemia     Hypertension     Hypothyroidism     Morbid obesity with BMI of 50.0-59.9, adult ([facility])     Obesity, morbid ([facility]) 08/22/2018    JANICE on CPAP     Pulmonary hypertension ([facility]) 08/22/2018    Severe aortic stenosis     Simple goiter      Skin cyst     within the armpits, right    Wears glasses        Past Surgical History:   Procedure Laterality Date    BREAST BIOPSY      CARDIAC CATHETERIZATION      CARPAL TUNNEL RELEASE Bilateral     CHOLECYSTECTOMY      DILATION AND CURETTAGE OF UTERUS      HYSTEROSCOPY      MASTOID SURGERY      OH COLONOSCOPY FLX DX W/COLLJ SPEC WHEN PFRMD N/A 9/6/2018    Procedure: COLONOSCOPY;  Surgeon: Shree Sosa III, MD;  Location: [facility];  Service: Gastroenterology    OH ECHO TRANSESOPHAG R-T 2D W/PRB IMG ACQUISJ I&R N/A 10/9/2018    Procedure: INTRA-OP TRANSESOPHAGEAL ECHOCARDIOGRAM (GARRISON);  Surgeon: Kushal Camarena DO;  Location:  MAIN OR;  Service: Cardiac Surgery    OH ESOPHAGOGASTRODUODENOSCOPY TRANSORAL DIAGNOSTIC N/A 8/31/2018    Procedure: ESOPHAGOGASTRODUODENOSCOPY (EGD);  Surgeon: Shree Sosa III, MD;  Location: [facility];  Service: Gastroenterology    OH REPLACE AORTIC VALVE OPENFEMORAL ARTERY APPROACH N/A 10/9/2018    Procedure: REPLACEMENT AORTIC VALVE TRANSCATHETER (TAVR) TRANSFEMORAL W/ 23 MM MENDOZA NOE S3 VALVE (ACCESS OF LEFT);  Surgeon: Kushal Camarena DO;  Location: [facility];  Service: Cardiac Surgery    TOTAL HIP ARTHROPLASTY Left 2007    TOTAL KNEE ARTHROPLASTY Bilateral        Family History   Problem Relation Age of Onset    Diabetes Mother     Stroke Mother     Cancer Father     Lung cancer Father     Diabetes Sister     Heart disease Sister     Hypertension Sister     Coronary artery disease Family     Diabetes Family     Hypertension Family     Cancer Family     Stroke Family     Thyroid disease Neg Hx      I have reviewed and agree with the history as documented.    E-Cigarette/Vaping    E-Cigarette Use Never User      E-Cigarette/Vaping Substances    Nicotine No     THC No     CBD No     Flavoring No     Other No     Unknown No      Social History     Tobacco Use    Smoking status: Never    Smokeless tobacco: Never   Vaping Use    Vaping status: Never Used    Substance Use Topics    Alcohol use: Not Currently    Drug use: Never       Review of Systems   Constitutional:  Negative for activity change, appetite change, chills, diaphoresis, fatigue, fever and unexpected weight change.   HENT:  Negative for congestion and sore throat.    Eyes:  Negative for visual disturbance.   Respiratory:  Negative for shortness of breath.    Cardiovascular:  Negative for chest pain and palpitations.   Gastrointestinal:  Negative for abdominal pain, nausea and vomiting.   Musculoskeletal:  Positive for arthralgias (Bilateral shoulders).   Skin:  Positive for color change (Bruising on multiple toes right foot, appears in healing process) and rash (Erythema dorsum right foot). Negative for pallor and wound.   Neurological:  Negative for dizziness, syncope, facial asymmetry, weakness, light-headedness, numbness and headaches.       Physical Exam  Physical Exam  Constitutional:       General: She is not in acute distress.     Appearance: She is obese. She is not ill-appearing, toxic-appearing or diaphoretic.   HENT:      Head: Normocephalic and atraumatic.      Nose: No rhinorrhea.   Eyes:      Conjunctiva/sclera: Conjunctivae normal.      Pupils: Pupils are equal, round, and reactive to light.   Cardiovascular:      Rate and Rhythm: Normal rate and regular rhythm.      Pulses: Normal pulses.      Heart sounds: Normal heart sounds. No murmur heard.     No friction rub. No gallop.   Pulmonary:      Effort: Pulmonary effort is normal. No respiratory distress.      Breath sounds: Normal breath sounds. No wheezing, rhonchi or rales.   Chest:      Chest wall: No tenderness.   Abdominal:      General: Bowel sounds are normal.      Palpations: Abdomen is soft.      Tenderness: There is no abdominal tenderness. There is no guarding or rebound.   Musculoskeletal:         General: Swelling, tenderness and signs of injury present.      Cervical back: No tenderness.      Right lower leg: Edema present.       Left lower leg: No edema.   Skin:     General: Skin is warm and dry.      Capillary Refill: Capillary refill takes less than 2 seconds.      Coloration: Skin is not jaundiced or pale.      Findings: Bruising and rash present. No erythema or lesion.   Neurological:      Mental Status: She is alert and oriented to person, place, and time.      Cranial Nerves: No cranial nerve deficit.      Sensory: No sensory deficit.      Motor: No weakness.         Vital Signs  ED Triage Vitals   Temperature Pulse Respirations Blood Pressure SpO2   08/06/24 0838 08/06/24 0823 08/06/24 0823 08/06/24 0823 08/06/24 0823   98.1 °F (36.7 °C) 68 22 155/67 98 %      Temp Source Heart Rate Source Patient Position - Orthostatic VS BP Location FiO2 (%)   08/06/24 0838 08/06/24 0823 08/06/24 0923 -- --   Oral Monitor Lying        Pain Score       --                  Vitals:    08/06/24 1114 08/06/24 1200 08/06/24 1215 08/06/24 1230   BP: 110/64 101/55 (!) 84/42 (!) 95/49   Pulse: (!) 45 (!) 54 (!) 45 (!) 49   Patient Position - Orthostatic VS: Lying  Lying          Visual Acuity  Visual Acuity      Flowsheet Row Most Recent Value   L Pupil Size (mm) 4   R Pupil Size (mm) 4            ED Medications  Medications   acetaminophen (Ofirmev) injection 1,000 mg (0 mg Intravenous Stopped 8/6/24 1017)   magnesium sulfate 4 g/100 mL IVPB (premix) 4 g (0 g Intravenous Stopped 8/6/24 1142)   multi-electrolyte (ISOLYTE-S PH 7.4) bolus 1,000 mL (1,000 mL Intravenous New Bag 8/6/24 1021)   cephalexin (KEFLEX) capsule 500 mg (500 mg Oral Given 8/6/24 1021)       Diagnostic Studies  Results Reviewed       Procedure Component Value Units Date/Time    Basic metabolic panel [[id number]]  (Abnormal) Collected: 08/06/24 1204    Lab Status: Final result Specimen: Blood from Arm, Right Updated: 08/06/24 1235     Sodium 132 mmol/L      Potassium 4.3 mmol/L      Chloride 97 mmol/L      CO2 27 mmol/L      ANION GAP 8 mmol/L      BUN 55 mg/dL      Creatinine 1.40  mg/dL      Glucose 115 mg/dL      Calcium 8.6 mg/dL      eGFR 34 ml/min/1.73sq m     Narrative:      National Kidney Disease Foundation guidelines for Chronic Kidney Disease (CKD):     Stage 1 with normal or high GFR (GFR > 90 mL/min/1.73 square meters)    Stage 2 Mild CKD (GFR = 60-89 mL/min/1.73 square meters)    Stage 3A Moderate CKD (GFR = 45-59 mL/min/1.73 square meters)    Stage 3B Moderate CKD (GFR = 30-44 mL/min/1.73 square meters)    Stage 4 Severe CKD (GFR = 15-29 mL/min/1.73 square meters)    Stage 5 End Stage CKD (GFR <15 mL/min/1.73 square meters)  Note: GFR calculation is accurate only with a steady state creatinine    Basic metabolic panel [[id number]]  (Abnormal) Collected: 08/06/24 0924    Lab Status: Final result Specimen: Blood from Arm, Right Updated: 08/06/24 0947     Sodium 130 mmol/L      Potassium 4.4 mmol/L      Chloride 96 mmol/L      CO2 24 mmol/L      ANION GAP 10 mmol/L      BUN 59 mg/dL      Creatinine 1.60 mg/dL      Glucose 108 mg/dL      Calcium 9.0 mg/dL      eGFR 29 ml/min/1.73sq m     Narrative:      National Kidney Disease Foundation guidelines for Chronic Kidney Disease (CKD):     Stage 1 with normal or high GFR (GFR > 90 mL/min/1.73 square meters)    Stage 2 Mild CKD (GFR = 60-89 mL/min/1.73 square meters)    Stage 3A Moderate CKD (GFR = 45-59 mL/min/1.73 square meters)    Stage 3B Moderate CKD (GFR = 30-44 mL/min/1.73 square meters)    Stage 4 Severe CKD (GFR = 15-29 mL/min/1.73 square meters)    Stage 5 End Stage CKD (GFR <15 mL/min/1.73 square meters)  Note: GFR calculation is accurate only with a steady state creatinine    Magnesium [[id number]]  (Abnormal) Collected: 08/06/24 0924    Lab Status: Final result Specimen: Blood from Arm, Right Updated: 08/06/24 0947     Magnesium 1.6 mg/dL     CBC and differential [[id number]]  (Abnormal) Collected: 08/06/24 0924    Lab Status: Final result Specimen: Blood from Arm, Right Updated: 08/06/24 0929     WBC 13.13 Thousand/uL      RBC  3.76 Million/uL      Hemoglobin 10.3 g/dL      Hematocrit 32.4 %      MCV 86 fL      MCH 27.4 pg      MCHC 31.8 g/dL      RDW 14.6 %      MPV 9.7 fL      Platelets 363 Thousands/uL      nRBC 0 /100 WBCs      Segmented % 79 %      Immature Grans % 1 %      Lymphocytes % 8 %      Monocytes % 8 %      Eosinophils Relative 4 %      Basophils Relative 0 %      Absolute Neutrophils 10.36 Thousands/µL      Absolute Immature Grans 0.14 Thousand/uL      Absolute Lymphocytes 1.08 Thousands/µL      Absolute Monocytes 1.04 Thousand/µL      Eosinophils Absolute 0.47 Thousand/µL      Basophils Absolute 0.04 Thousands/µL     Fingerstick Glucose (POCT) [[id number]]  (Normal) Collected: 08/06/24 0822    Lab Status: Final result Specimen: Blood Updated: 08/06/24 0823     POC Glucose 114 mg/dl                    XR foot 3+ views RIGHT   Final Result by Christopher Cohen MD (08/06 1255)      No acute osseous abnormality.         Computerized Assisted Algorithm (CAA) may have been used to analyze all applicable images.         Workstation performed: [id number]         XR chest 1 view portable   Final Result by Milagro Mcdaniels MD (08/06 1205)      Skeleton normal for age.  No acute displaced fractures.      Low lung volumes producing vascular crowding. Question mild pulmonary venous congestion.            Workstation performed: [id number]         XR shoulder 2+ views RIGHT   Final Result by Christopher Cohen MD (08/06 1256)      No acute osseous abnormality.         Computerized Assisted Algorithm (CAA) may have been used to analyze all applicable images.         Workstation performed: [id number]         XR shoulder 2+ views LEFT   Final Result by Christopher Cohen MD (08/06 1257)      No acute osseous abnormality.         Computerized Assisted Algorithm (CAA) may have been used to analyze all applicable images.         Workstation performed: [id number]         CT head without contrast   Final Result by Cedric Tavares MD (08/06 0859)      No  acute intracranial abnormality.                  Workstation performed: [id number]         CT spine cervical without contrast   Final Result by Cedric Tavares MD (08/06 0902)      No cervical spine fracture or traumatic malalignment.                  Workstation performed: [id number]                ED Course  ED Course as of 08/06/24 1812 Tue Aug 06, 2024   0853 EKG reveals sinus rhythm at a rate of 70 bpm, IN interval mildly prolonged, QRS/QT intervals within normal limits, no ST elevations or depressions, no abnormal T wave inversions, largely unchanged from previous EKG 7/29/2024.   0942 CT head and C-spine negative for acute traumatic abnormality   1149 Repeat EKG revealing of sinus bradycardia at a rate of 46 bpm, QT remains prolonged as well as IN interval, no abnormal T wave inversions, no ST elevations or depressions.   1201 Patient asymptomatic at this time, reports feeling very sleepy but feeling overall well, and desiring to go home.  Patient states that she would be willing to stay for admission if needed   1328 Hypotensive, SBP 80s, when awakened, . Patient is drowsy but oriented x 3. Patient \'s abdomen re-evaluated and soft nontender nondisteended. Heart and IVC evaluated with US to reveal good LV squeeze and compressible IVC. Cr/BUN repeated and still elevated. Will provide another isolyte bolus at this time.       Medical Decision Making  INITIAL EVALUATION: Patient presented per EMS 1 hour s/p fall, + Coumadin, + HS, -LOC.  Patient is alert and oriented to person place and situation.  Vital signs normal for patient, 98% on 2 L nasal cannula.  Physical exam notable for bruising and erythema and swelling of right foot, in addition to tenderness of bilateral shoulders, head and C-spine without tenderness of spine or trauma, and rest of spine nontender to palpation without step-offs or deformities, bruising of the skin erythema or skin tears.  Initial impression is mechanical fall, with  differential diagnosis including ICH, C-spine injury, shoulder injury, right foot injury or cellulitis.  Will initiate workup to include chest x-ray, bilateral shoulder x-rays, and right foot x-ray, in addition to CT head and CT C-spine.  Will provide acetaminophen for pain relief at this time.  Will continue to monitor patient during ER workup.    FINDINGS: CT head and C-spine negative for traumatic injury.  Chest x-ray, bilateral shoulder x-rays, and right lower extremity x-ray negative for traumatic injuries per initial read, awaiting radiologic report.  Lab work notable for prerenal ERIN with acutely elevated BUN and creatinine 59 and 1.6 respectively.  Additionally patient's magnesium low 1.6 and sodium 130.  Patient reports feeling well and wanting to go home.  Will plan to resuscitate and replenish magnesium.  Will continue to monitor patient's vital signs and symptoms.    Patient gradually developed asymptomatic hypotension whenever she would fall asleep, systolic in the 80s, with blood pressure improving when awakened.  Repeat BMP following resuscitation revealed significant persisting elevation of creatinine and BUN, but mild improvement.  Abdominal exam unchanged, soft, nontender, nondistended.  Heart and IVC evaluated with ultrasound, and heart appearing to have good squeeze and IVC easily compressible.  Second Isolyte bolus ordered.    SUMMARY: History, physical exam, and findings today suggestive of acute ERIN secondary to recent Lasix prescription and poor oral hydration at home.  Patient without traumatic injuries per ER workup.  Patient additionally hypotensive and extremely drowsy on physical exam.  Patient amenable to admission for observation, resuscitation, and cardiology evaluation.  Patient agreeable to this plan.  Patient admitted in stable condition.    Amount and/or Complexity of Data Reviewed  Labs: ordered.  Radiology: ordered.    Risk  Prescription drug management.  Decision regarding  hospitalization.           Disposition  Final diagnoses:   Fall, initial encounter   Hypotension   Bradycardia   Hypomagnesemia   Cellulitis of right foot   Chronic pain of both shoulders     Time reflects when diagnosis was documented in both MDM as applicable and the Disposition within this note       Time User Action Codes Description Comment    8/6/2024 12:59 PM Elena Alexander [W19.XXXA] Fall, initial encounter     8/6/2024 12:59 PM Elena Alexander [I95.9] Hypotension     8/6/2024 12:59 PM Elena Alexander [R00.1] Bradycardia     8/6/2024 12:59 PM Elena Alexander [E83.42] Hypomagnesemia     8/6/2024 12:59 PM Elena Alexander [L03.115] Cellulitis of right foot     8/6/2024 12:59 PM Elena Alexander [M25.511,  G89.29,  M25.512] Chronic pain of both shoulders           ED Disposition       ED Disposition   Admit    Condition   Stable    Date/Time   Tue Aug 6, 2024 12:59 PM    Comment   Case was discussed with Dr Carter and the patient's admission status was agreed to be Admission Status: observation status to the service of Dr. Carter .               Follow-up Information    None         Patient's Medications   Discharge Prescriptions    No medications on file       No discharge procedures on file.    PDMP Review         Value Time User    PDMP Reviewed  Yes 7/16/2024 11:36 AM MABEL Oliveira            ED Provider  Electronically Signed by             Elena Alexander PA-C  08/06/24 3333

## 2024-08-07 LAB
ANION GAP SERPL CALCULATED.3IONS-SCNC: 7 MMOL/L (ref 4–13)
ATRIAL RATE: 44 BPM
BNP SERPL-MCNC: 313 PG/ML (ref 0–100)
BUN SERPL-MCNC: 39 MG/DL (ref 5–25)
CALCIUM SERPL-MCNC: 8.4 MG/DL (ref 8.4–10.2)
CHLORIDE SERPL-SCNC: 103 MMOL/L (ref 96–108)
CO2 SERPL-SCNC: 27 MMOL/L (ref 21–32)
CREAT SERPL-MCNC: 0.88 MG/DL (ref 0.6–1.3)
ERYTHROCYTE [DISTWIDTH] IN BLOOD BY AUTOMATED COUNT: 14.6 % (ref 11.6–15.1)
GFR SERPL CREATININE-BSD FRML MDRD: 60 ML/MIN/1.73SQ M
GLUCOSE P FAST SERPL-MCNC: 106 MG/DL (ref 65–99)
GLUCOSE SERPL-MCNC: 106 MG/DL (ref 65–140)
HCT VFR BLD AUTO: 28.4 % (ref 34.8–46.1)
HGB BLD-MCNC: 9.2 G/DL (ref 11.5–15.4)
INR PPP: 5.17 (ref 0.85–1.19)
MAGNESIUM SERPL-MCNC: 2.5 MG/DL (ref 1.9–2.7)
MCH RBC QN AUTO: 27.8 PG (ref 26.8–34.3)
MCHC RBC AUTO-ENTMCNC: 32.4 G/DL (ref 31.4–37.4)
MCV RBC AUTO: 86 FL (ref 82–98)
P AXIS: 24 DEGREES
PLATELET # BLD AUTO: 331 THOUSANDS/UL (ref 149–390)
PMV BLD AUTO: 9.6 FL (ref 8.9–12.7)
POTASSIUM SERPL-SCNC: 4.2 MMOL/L (ref 3.5–5.3)
PR INTERVAL: 278 MS
PROCALCITONIN SERPL-MCNC: 0.12 NG/ML
PROTHROMBIN TIME: 47.8 SECONDS (ref 12.3–15)
QRS AXIS: -36 DEGREES
QRSD INTERVAL: 84 MS
QT INTERVAL: 520 MS
QTC INTERVAL: 444 MS
RBC # BLD AUTO: 3.31 MILLION/UL (ref 3.81–5.12)
SODIUM SERPL-SCNC: 137 MMOL/L (ref 135–147)
T WAVE AXIS: 45 DEGREES
VENTRICULAR RATE: 44 BPM
WBC # BLD AUTO: 12.31 THOUSAND/UL (ref 4.31–10.16)

## 2024-08-07 PROCEDURE — 99232 SBSQ HOSP IP/OBS MODERATE 35: CPT | Performed by: NURSE PRACTITIONER

## 2024-08-07 PROCEDURE — 83735 ASSAY OF MAGNESIUM: CPT | Performed by: NURSE PRACTITIONER

## 2024-08-07 PROCEDURE — 97167 OT EVAL HIGH COMPLEX 60 MIN: CPT

## 2024-08-07 PROCEDURE — 99223 1ST HOSP IP/OBS HIGH 75: CPT | Performed by: INTERNAL MEDICINE

## 2024-08-07 PROCEDURE — 85610 PROTHROMBIN TIME: CPT | Performed by: NURSE PRACTITIONER

## 2024-08-07 PROCEDURE — 97163 PT EVAL HIGH COMPLEX 45 MIN: CPT

## 2024-08-07 PROCEDURE — 97110 THERAPEUTIC EXERCISES: CPT

## 2024-08-07 PROCEDURE — 83880 ASSAY OF NATRIURETIC PEPTIDE: CPT | Performed by: PHYSICIAN ASSISTANT

## 2024-08-07 PROCEDURE — 80048 BASIC METABOLIC PNL TOTAL CA: CPT | Performed by: STUDENT IN AN ORGANIZED HEALTH CARE EDUCATION/TRAINING PROGRAM

## 2024-08-07 PROCEDURE — 93010 ELECTROCARDIOGRAM REPORT: CPT | Performed by: INTERNAL MEDICINE

## 2024-08-07 PROCEDURE — 85027 COMPLETE CBC AUTOMATED: CPT | Performed by: STUDENT IN AN ORGANIZED HEALTH CARE EDUCATION/TRAINING PROGRAM

## 2024-08-07 PROCEDURE — 84145 PROCALCITONIN (PCT): CPT | Performed by: NURSE PRACTITIONER

## 2024-08-07 RX ORDER — ACETAMINOPHEN 10 MG/ML
1000 INJECTION, SOLUTION INTRAVENOUS ONCE
Status: COMPLETED | OUTPATIENT
Start: 2024-08-07 | End: 2024-08-07

## 2024-08-07 RX ORDER — ACETAMINOPHEN 325 MG/1
650 TABLET ORAL EVERY 6 HOURS PRN
Status: DISCONTINUED | OUTPATIENT
Start: 2024-08-07 | End: 2024-08-08 | Stop reason: HOSPADM

## 2024-08-07 RX ADMIN — ASPIRIN 81 MG: 81 TABLET, COATED ORAL at 08:22

## 2024-08-07 RX ADMIN — LEVOTHYROXINE SODIUM 50 MCG: 0.05 TABLET ORAL at 08:22

## 2024-08-07 RX ADMIN — ACETAMINOPHEN 650 MG: 325 TABLET, FILM COATED ORAL at 23:41

## 2024-08-07 RX ADMIN — PANTOPRAZOLE SODIUM 40 MG: 40 TABLET, DELAYED RELEASE ORAL at 05:22

## 2024-08-07 RX ADMIN — HEPARIN SODIUM 5000 UNITS: 5000 INJECTION INTRAVENOUS; SUBCUTANEOUS at 21:40

## 2024-08-07 RX ADMIN — PRAVASTATIN SODIUM 80 MG: 80 TABLET ORAL at 16:32

## 2024-08-07 RX ADMIN — PREGABALIN 25 MG: 25 CAPSULE ORAL at 21:41

## 2024-08-07 RX ADMIN — SERTRALINE HYDROCHLORIDE 100 MG: 50 TABLET ORAL at 08:22

## 2024-08-07 RX ADMIN — HEPARIN SODIUM 5000 UNITS: 5000 INJECTION INTRAVENOUS; SUBCUTANEOUS at 14:35

## 2024-08-07 RX ADMIN — ACETAMINOPHEN 1000 MG: 10 INJECTION INTRAVENOUS at 05:22

## 2024-08-07 RX ADMIN — HEPARIN SODIUM 5000 UNITS: 5000 INJECTION INTRAVENOUS; SUBCUTANEOUS at 05:22

## 2024-08-07 RX ADMIN — OXYBUTYNIN CHLORIDE 5 MG: 5 TABLET, EXTENDED RELEASE ORAL at 08:22

## 2024-08-07 NOTE — CASE MANAGEMENT
Case Management Progress Note    Patient name Marisol Otoole  Location 2 EAST 259/2 E 259-01 MRN [id number]  : 1940 Date 2024       LOS (days): 0  Geometric Mean LOS (GMLOS) (days):   Days to GMLOS:        OBJECTIVE:     Current admission status: Observation  Preferred Pharmacy:   [facility]  [address] [address]  [place] [address]  Phone: [phone] Fax: [phone]    Primary Care Provider: MABEL Hallman  Primary Insurance: MEDICARE  Secondary Insurance:     PROGRESS NOTE:  CM completed general chart review.  CM assessment to follow.  [facility] referral sent in [facility].  Patient is current with [facility].  [facility] referral also opened due to [facility].  CM to obtain patient choice & follow for PT/OT recs.      @ [facility] confirmed for [facility] upon d/c if appropriate.  Reserved in AIDIN.

## 2024-08-07 NOTE — PLAN OF CARE
Problem: PHYSICAL THERAPY ADULT  Goal: Performs mobility at highest level of function for planned discharge setting.  See evaluation for individualized goals.  Description: Treatment/Interventions: Functional transfer training, LE strengthening/ROM, Therapeutic exercise, Endurance training, Patient/family training, Bed mobility, Gait training, Spoke to nursing, OT, Spoke to advanced practitioner          See flowsheet documentation for full assessment, interventions and recommendations.  Note: Prognosis: Good  Problem List: Decreased strength, Decreased endurance, Impaired balance, Decreased mobility, Pain  Assessment: Pt is 83 year old female seen for PT evaluation s/p admit to [facility] on 8/6/2024 with Fall. PT consulted to assess pt's functional mobility and discharge needs. Order placed for PT evaluation and treatment, with up and out of bed as tolerated order. Comorbidities affecting pt's physical performance at time of assessment include acquired hypothyroidism, hyperlipidemia, GERD, history of TAVR, depression with anxiety, chronic hypoxemic respiratory failure, ERIN, leukocytosis, paroxysmal a-fib, and hypomagnesemia. Prior to hospitalization, pt was independent with all functional mobility with a walker. Pt ambulates household and community distances. Pt resides with her friend, in a one level house with no steps to enter. Personal factors affecting pt at time of initial evaluation include ambulating with an assistive device, inability to ambulate household distances, inability to ambulate community distances, inability to navigate level surfaces without external assistance, unable to perform dynamic tasks in the community, limited home support, positive fall history, difficulty performing ADLs, and inability to perform IADLs. Please find objective findings from PT assessment regarding body systems outlined above with impairments and limitations including weakness, impaired balance, decreased  endurance, gait deviations, pain, decreased activity tolerance, decreased functional mobility tolerance, and fall risk. The following objective measures were performed on initial evaluation Barthel Index: 40/100, Modified Yancy: 4 (moderate/severe disability), and AM-PAC 6-Clicks: 16/24. Pt's clinical presentation is currently unstable/unpredictable seen in pt's presentation of need for ongoing medical management/monitoring, pt is a fall risk, and pt requires cues and assist for safety with functional mobility. Pt to benefit from continued PT treatment to address deficits as defined above and maximize pt's level of function and independence with mobility. From a PT standpoint, recommendation at time of discharge would be level 2, moderate resource intensity in order to facilitate return to prior level of function.    Barriers to Discharge: Decreased caregiver support, Other (Comment) (decline in functional mobility)     Rehab Resource Intensity Level, PT: II (Moderate Resource Intensity)    See flowsheet documentation for full assessment.

## 2024-08-07 NOTE — CONSULTS
Consultation - Cardiology   Marisol Otoole 83 y.o. female MRN: [id number]  Unit/Bed#: 2 E 259-01 Encounter: [id number]  08/07/24  1:14 PM    Assessment/ Plan:  Hypotension  Currently asymptomatic  Blood pressure down to 84/42  Currently 108/74  All antihypertensives currently on hold  Advised about importance of staying well-hydrated  Continue with IVF    2.  Bradycardia  Sinus 40s to 50s  Asymptomatic  TSH 1.25  Patient has not tolerated beta-blockers in the past because of baseline bradycardia  Continue to monitor telemetry  Echo done during last admission showed EF at 65%, RV is dilated, biatrial dilation, TAVR bioprosthetic valve noted with gradients of 29/14, mild AI, moderate MAC, mild MR, moderate MS, moderate to severe TR, moderate to severe pulmonary hypertension with PA pressure 54    3.  Mechanical fall  Due to lower extremity weakness  PT/OT consulted    4.  History of atrial fibrillation  Currently in sinus/sinus bradycardia  All rate control medications on hold  Previously on Coumadin: Currently INR is supratherapeutic 5.1    5.  Supratherapeutic INR  INR 5.1  Continue to hold Coumadin    6.  Chronic HFpEF  7.  CAD  8.  Hyperlipidemia  9.  History of aortic stenosis status post TAVR    History of Present Illness   Physician Requesting Consult: Javier FORDE MD    Reason for Consult / Principal Problem: Hypotension, bradycardia    HPI: Marisol Otoole is a 83 y.o. year old female who presents with fall.  Patient recently hospitalized and discharged for CHF exacerbation.  Patient states she was walking to the bathroom when her legs gave out and she lowered herself to the ground.  Patient denied any loss of consciousness.  Occurred around 7:30 AM the morning of admission.  Patient promptly used her medical alert who then called EMS because she had difficulty getting herself up.  Patient denies chest pain, chest pressure, chest heaviness, shortness of breath, abdominal pain, palpitations.  Patient was  feeling well prior to the mechanical fall.  Patient noted to be hypotensive and bradycardic during hospitalization.  Cardiology consulted for this.    PMH: Atrial fibrillation on Coumadin, chronic HFpEF, CAD, hypertension, hyperlipidemia, pulmonary hypertension, JANICE, COPD, GERD, anxiety, AS status post TAVR    Inpatient consult to Cardiology  Consult performed by: Peace Borden PA-C  Consult ordered by: MABEL Perez          EKG: Sinus bradycardia with sinus arrhythmia, first-degree block, LAD      Review of Systems   Constitutional: Negative.    Respiratory: Negative.     Cardiovascular: Negative.    Musculoskeletal: Negative.    Neurological:  Positive for weakness.        + Mechanical fall   Hematological: Negative.    Psychiatric/Behavioral: Negative.     All other systems reviewed and are negative.      Historical Information   Past Medical History:   Diagnosis Date    Anemia 08/22/2018    Anxiety     Arthritis     AVB (atrioventricular block)     first degree    Cataract     CHF (congestive heart failure) (HCC)     COPD, mild (HCC)     Coronary artery disease     Dislocation of right shoulder joint     Frequent UTI     GERD (gastroesophageal reflux disease)     H/O: pneumonia     Heme positive stool     Hyperlipidemia     Hypertension     Hypothyroidism     Morbid obesity with BMI of 50.0-59.9, adult (HCC)     Obesity, morbid (HCC) 08/22/2018    JANICE on CPAP     Pulmonary hypertension (HCC) 08/22/2018    Severe aortic stenosis     Simple goiter     Skin cyst     within the armpits, right    Wears glasses      Past Surgical History:   Procedure Laterality Date    BREAST BIOPSY      CARDIAC CATHETERIZATION      CARPAL TUNNEL RELEASE Bilateral     CHOLECYSTECTOMY      DILATION AND CURETTAGE OF UTERUS      HYSTEROSCOPY      MASTOID SURGERY      WA COLONOSCOPY FLX DX W/COLLJ SPEC WHEN PFRMD N/A 9/6/2018    Procedure: COLONOSCOPY;  Surgeon: Shree Sosa III, MD;  Location: [facility];  Service:  Gastroenterology    IL ECHO TRANSESOPHAG R-T 2D W/PRB IMG ACQUISJ I&R N/A 10/9/2018    Procedure: INTRA-OP TRANSESOPHAGEAL ECHOCARDIOGRAM (GARRISON);  Surgeon: Kushal Camarena DO;  [facility];  Service: Cardiac Surgery    IL ESOPHAGOGASTRODUODENOSCOPY TRANSORAL DIAGNOSTIC N/A 8/31/2018    Procedure: ESOPHAGOGASTRODUODENOSCOPY (EGD);  Surgeon: Shree Sosa III, MD;  Location: [facility];  Service: Gastroenterology    IL REPLACE AORTIC VALVE OPENFEMORAL ARTERY APPROACH N/A 10/9/2018    Procedure: REPLACEMENT AORTIC VALVE TRANSCATHETER (TAVR) TRANSFEMORAL W/ 23 MM MENDOZA NOE S3 VALVE (ACCESS OF LEFT);  Surgeon: Kushal Camarena DO;  [facility];  Service: Cardiac Surgery    TOTAL HIP ARTHROPLASTY Left 2007    TOTAL KNEE ARTHROPLASTY Bilateral      Social History     Substance and Sexual Activity   Alcohol Use Not Currently     Social History     Substance and Sexual Activity   Drug Use Never     Social History     Tobacco Use   Smoking Status Never   Smokeless Tobacco Never       Family History:   Family History   Problem Relation Age of Onset    Diabetes Mother     Stroke Mother     Cancer Father     Lung cancer Father     Diabetes Sister     Heart disease Sister     Hypertension Sister     Coronary artery disease Family     Diabetes Family     Hypertension Family     Cancer Family     Stroke Family     Thyroid disease Neg Hx        Meds/Allergies   all current active meds have been reviewed  Allergies   Allergen Reactions    Latex Rash    Neosporin [Neomycin-Bacitracin Zn-Polymyx] Rash and Other (See Comments)     hives per PACC order       Objective   Vitals: Blood pressure 108/74, pulse (!) 46, temperature 98.7 °F (37.1 °C), temperature source Oral, resp. rate 18, weight 84.5 kg (186 lb 4.6 oz), SpO2 95%, not currently breastfeeding., Body mass index is 44.92 kg/m².,   Orthostatic Blood Pressures      Flowsheet Row Most Recent Value   Blood Pressure 108/74 filed at 08/07/2024 1100    Patient Position - Orthostatic VS Lying filed at 2024 1100            Systolic (24hrs), Av , Min:94 , Max:115     Diastolic (24hrs), Av, Min:45, Max:74        Intake/Output Summary (Last 24 hours) at 2024 1314  Last data filed at 2024 0410  Gross per 24 hour   Intake 2180 ml   Output 2000 ml   Net 180 ml       Invasive Devices       Peripheral Intravenous Line  Duration             Peripheral IV 24 Right Antecubital 1 day              Drain  Duration             External Urinary Catheter 1 day                        Physical Exam:  GEN: Alert and oriented x 3, in no acute distress.  Well appearing and well nourished.   HEENT: Sclera anicteric, conjunctivae pink, mucous membranes moist. Oropharynx clear.   NECK: Supple, no carotid bruits, no significant JVD. Trachea midline, no thyromegaly.   HEART: Regular bradycardic, normal S1 and S2, no murmurs, clicks, gallops or rubs. PMI nondisplaced, no thrills.   LUNGS: Clear to auscultation bilaterally; no wheezes, rales, or rhonchi. No increased work of breathing or signs of respiratory distress.   ABDOMEN: Soft, nontender, nondistended, normoactive bowel sounds.   EXTREMITIES: + bruising noted over right toes, Skin warm and well perfused, no clubbing, cyanosis, or edema.  NEURO: No focal findings. Normal speech. Mood and affect normal.   SKIN: Normal without suspicious lesions on exposed skin.      Lab Results:     Troponins:       CBC with diff:   Results from last 7 days   Lab Units 24  0518 24  1428 24  0924 24  0515   WBC Thousand/uL 12.31*  --  13.13* 12.01*   HEMOGLOBIN g/dL 9.2*  --  10.3* 10.6*   HEMATOCRIT % 28.4*  --  32.4* 34.8   MCV fL 86  --  86 87   PLATELETS Thousands/uL 331 303 363 438*   RBC Million/uL 3.31*  --  3.76* 3.98   MCH pg 27.8  --  27.4 26.6*   MCHC g/dL 32.4  --  31.8 30.5*   RDW % 14.6  --  14.6 14.4   MPV fL 9.6 9.0 9.7 9.5   NRBC AUTO /100 WBCs  --   --  0  --          CMP:   Results  from last 7 days   Lab Units 08/07/24  0518 08/06/24  1204 08/06/24  0924 08/01/24  0515   POTASSIUM mmol/L 4.2 4.3 4.4 4.0   CHLORIDE mmol/L 103 97 96 102   CO2 mmol/L 27 27 24 30   BUN mg/dL 39* 55* 59* 38*   CREATININE mg/dL 0.88 1.40* 1.60* 0.95   CALCIUM mg/dL 8.4 8.6 9.0 9.7   EGFR ml/min/1.73sq m 60 34 29 55

## 2024-08-07 NOTE — PHYSICAL THERAPY NOTE
Physical Therapy Evaluation     Patient's Name: Marisol Otoole    Admitting Diagnosis  Hypomagnesemia [E83.42]  Bradycardia [R00.1]  Head injury [S09.90XA]  Hypotension [I95.9]  Cellulitis of right foot [L03.115]  Fall, initial encounter [W19.XXXA]  Chronic pain of both shoulders [M25.511, G89.29, M25.512]    Problem List  Patient Active Problem List   Diagnosis    Acquired hypothyroidism    Primary hypertension    Hyperlipidemia    JANICE (obstructive sleep apnea)    LVH (left ventricular hypertrophy)    Mitral annular calcification    Mitral valve stenosis    Pulmonary hypertension (HCC)    Acute diastolic congestive heart failure (HCC)    Iron deficiency anemia secondary to inadequate dietary iron intake    Obesity, morbid (HCC)    Gastroesophageal reflux disease without esophagitis    Primary osteoarthritis of left shoulder    AVB (atrioventricular block)    Acute on chronic diastolic congestive heart failure (HCC)    COPD, mild (HCC)    Coronary artery disease involving native coronary artery of native heart without angina pectoris    S/P TAVR (transcatheter aortic valve replacement)    Depression with anxiety    Insomnia    Osteopenia    Depression, recurrent (HCC)    Chronic hypoxemic respiratory failure (HCC)    Radiculopathy, lumbar region    Stage 3a chronic kidney disease (HCC)    Orbital mass    Fall    ERIN (acute kidney injury) (HCC)    Leucocytosis    Ambulatory dysfunction    At risk for injury related to fall    Walker as ambulation aid    Urinary frequency    Bradycardia    Chronic anticoagulation    Paroxysmal A-fib (HCC)    Hypomagnesemia     Past Medical History  Past Medical History:   Diagnosis Date    Anemia 08/22/2018    Anxiety     Arthritis     AVB (atrioventricular block)     first degree    Cataract     CHF (congestive heart failure) (HCC)     COPD, mild (HCC)     Coronary artery disease     Dislocation of right shoulder joint     Frequent UTI     GERD (gastroesophageal reflux disease)      H/O: pneumonia     Heme positive stool     Hyperlipidemia     Hypertension     Hypothyroidism     Morbid obesity with BMI of 50.0-59.9, adult (HCC)     Obesity, morbid (HCC) 08/22/2018    JANICE on CPAP     Pulmonary hypertension (HCC) 08/22/2018    Severe aortic stenosis     Simple goiter     Skin cyst     within the armpits, right    Wears glasses      Past Surgical History  Past Surgical History:   Procedure Laterality Date    BREAST BIOPSY      CARDIAC CATHETERIZATION      CARPAL TUNNEL RELEASE Bilateral     CHOLECYSTECTOMY      DILATION AND CURETTAGE OF UTERUS      HYSTEROSCOPY      MASTOID SURGERY      RI COLONOSCOPY FLX DX W/COLLJ SPEC WHEN PFRMD N/A 9/6/2018    Procedure: COLONOSCOPY;  Surgeon: Shree Sosa III, MD;  [facility];  Service: Gastroenterology    RI ECHO TRANSESOPHAG R-T 2D W/PRB IMG ACQUISJ I&R N/A 10/9/2018    Procedure: INTRA-OP TRANSESOPHAGEAL ECHOCARDIOGRAM (GARRISON);  Surgeon: Kushal Camarena DO;  [facility];  Service: Cardiac Surgery    RI ESOPHAGOGASTRODUODENOSCOPY TRANSORAL DIAGNOSTIC N/A 8/31/2018    Procedure: ESOPHAGOGASTRODUODENOSCOPY (EGD);  Surgeon: Shree Sosa III, MD;  [facility];  Service: Gastroenterology    RI REPLACE AORTIC VALVE OPENFEMORAL ARTERY APPROACH N/A 10/9/2018    Procedure: REPLACEMENT AORTIC VALVE TRANSCATHETER (TAVR) TRANSFEMORAL W/ 23 MM MENDOZA NOE S3 VALVE (ACCESS OF LEFT);  Surgeon: Kushal Camarena DO;  [facility];  Service: Cardiac Surgery    TOTAL HIP ARTHROPLASTY Left 2007    TOTAL KNEE ARTHROPLASTY Bilateral       08/07/24 0843   PT Last Visit   PT Visit Date 08/07/24   Note Type   Note type Evaluation and Treatment   Additional Comments Pt seen from [id number] where patient provided home setup; pt was seen from [id number] for PT eval and [id number] for PT treatment session   Pain Assessment   Pain Assessment Tool 0-10   Pain Score 7   Pain Location/Orientation Location: Generalized   Pain Onset/Description  "Descriptor: [facility] Pain Intervention(s) Repositioned;Ambulation/increased activity   Restrictions/Precautions   Weight Bearing Precautions Per Order No   Other Precautions Chair Alarm;Bed Alarm;Multiple lines;Telemetry;O2;Fall Risk;Pain  (+2L O2 via NC)   Home Living   Type of Home House   Home Layout One level;Able to live on main level with bedroom/bathroom;Performs ADLs on one level  (No REGINO)   Bathroom Shower/Tub Walk-in shower   Bathroom Toilet Raised   Bathroom Equipment Grab bars in shower;Shower chair   Bathroom Accessibility Accessible   Home Equipment Walker;Cane;Other (Comment)  (home O2 2L PRN during the day; CPAP at night)   Additional Comments Pt ambulates with a walker.   Prior Function   Level of [place] Independent with functional mobility;Independent with ADLs;Independent with IADLS   Lives With Friend(s)  (\"[place]\")   Receives Help From Friend(s);Home health  (home nurse and therapy)   IADLs Family/Friend/Other provides transportation;Independent with meal prep;Independent with medication management   Falls in the last 6 months 1 to 4  (1 fall PTA, 3 falls total)   Vocational Retired   General   Additional Pertinent History NP Tamiko cleared for PT.   Family/Caregiver Present No   Cognition   Overall Cognitive Status WFL   Arousal/Participation Alert   Attention Within functional limits   Orientation Level Oriented X4   Memory Within functional limits   Following Commands Follows multistep commands without difficulty   Comments Pt agreeable to PT.   Subjective   Subjective \"I'm ready to get up.\"   RLE Assessment   RLE Assessment X   Strength RLE   RLE Overall Strength 4-/5   LLE Assessment   LLE Assessment X   Strength LLE   LLE Overall Strength 4-/5   Light Touch   RLE Light Touch Grossly intact   LLE Light Touch Grossly intact   Bed Mobility   Supine to Sit 4  Minimal assistance   Additional items Assist x 1;HOB elevated;Bedrails;Increased time required;Verbal cues;LE management "   Additional Comments Pt denied complaints of dizziness following supine to sit transfer   Transfers   Sit to Stand 4  Minimal assistance   Additional items Assist x 1;Increased time required;Verbal cues   Stand to Sit 4  Minimal assistance   Additional items Assist x 1;Armrests;Increased time required;Verbal cues   Ambulation/Elevation   Gait pattern Decreased toe off;Decreased heel strike;Decreased hip extension;Short stride;Shuffling   Gait Assistance 4  Minimal assist   Additional items Assist x 1;Verbal cues   Assistive Device Rolling walker   Distance 10 feet x 2 trials   Balance   Static Sitting Fair +   Dynamic Sitting Fair   Static Standing Fair -   Dynamic Standing Poor +   Ambulatory Poor +   Endurance Deficit   Endurance Deficit Yes   Endurance Deficit Description decreased activity tolerance; pt requiring supplemental oxygen; pt was received on 2L O2 via NC   Activity Tolerance   Activity Tolerance Patient tolerated treatment well   Medical Staff Made Aware OT Magi; OT student Rina  (Co-evaluation performed with OT secondary to complex medical condition of patient and regression of functional status from baseline. PT/OT goals were addressed separately.)   Assessment   Prognosis Good   Problem List Decreased strength;Decreased endurance;Impaired balance;Decreased mobility;Pain   Assessment Pt is 83 year old female seen for PT evaluation s/p admit to [facility] on 8/6/2024 with Fall. PT consulted to assess pt's functional mobility and discharge needs. Order placed for PT evaluation and treatment, with up and out of bed as tolerated order. Comorbidities affecting pt's physical performance at time of assessment include acquired hypothyroidism, hyperlipidemia, GERD, history of TAVR, depression with anxiety, chronic hypoxemic respiratory failure, ERIN, leukocytosis, paroxysmal a-fib, and hypomagnesemia. Prior to hospitalization, pt was independent with all functional mobility with a walker. Pt  ambulates household and community distances. Pt resides with her friend, in a one level house with no steps to enter. Personal factors affecting pt at time of initial evaluation include ambulating with an assistive device, inability to ambulate household distances, inability to ambulate community distances, inability to navigate level surfaces without external assistance, unable to perform dynamic tasks in the community, limited home support, positive fall history, difficulty performing ADLs, and inability to perform IADLs. Please find objective findings from PT assessment regarding body systems outlined above with impairments and limitations including weakness, impaired balance, decreased endurance, gait deviations, pain, decreased activity tolerance, decreased functional mobility tolerance, and fall risk. The following objective measures were performed on initial evaluation Barthel Index: 40/100, Modified [place]: 4 (moderate/severe disability), and AM-PAC 6-Clicks: 16/24. Pt's clinical presentation is currently unstable/unpredictable seen in pt's presentation of need for ongoing medical management/monitoring, pt is a fall risk, and pt requires cues and assist for safety with functional mobility. Pt to benefit from continued PT treatment to address deficits as defined above and maximize pt's level of function and independence with mobility. From a PT standpoint, recommendation at time of discharge would be level 2, moderate resource intensity in order to facilitate return to prior level of function.   Barriers to Discharge Decreased caregiver support;Other (Comment)  (decline in functional mobility)   Goals   STG Expiration Date 08/17/24   Short Term Goal #1 In 10 days: Increase bilateral LE strength 1/2 grade to facilitate independent mobility, Perform all bed mobility tasks modified independent to decrease caregiver burden, Perform all transfers modified independent to improve independence, Ambulate > 150 ft.  with RW modified independent w/o LOB and w/ normalized gait pattern 100% of the time, and Increase all balance 1/2 grade to decrease risk for falls   PT Treatment Day 1   Plan   Treatment/Interventions Functional transfer training;LE strengthening/ROM;Therapeutic exercise;Endurance training;Patient/family training;Bed mobility;Gait training;Spoke to nursing;OT;Spoke to advanced practitioner   PT Frequency 3-5x/wk   Discharge Recommendation   Rehab Resource Intensity Level, PT II (Moderate Resource Intensity)   AM-PAC Basic Mobility Inpatient   Turning in Flat Bed Without Bedrails 3   Lying on Back to Sitting on Edge of Flat Bed Without Bedrails 3   Moving Bed to Chair 3   Standing Up From Chair Using Arms 3   Walk in Room 3   Climb 3-5 Stairs With Railing 1   Basic Mobility Inpatient Raw Score 16   Basic Mobility Standardized Score 38.32   [facility] Highest Level Of Mobility   -HLM Goal 5: Stand one or more mins   -HLM Achieved 6: Walk 10 steps or more   Modified Dent Scale   Modified Yancy Scale 4   Barthel Index   Feeding 5   Bathing 0   Grooming Score 0   Dressing Score 5   Bladder Score 5   Bowels Score 10   Toilet Use Score 5   Transfers (Bed/Chair) Score 10   Mobility (Level Surface) Score 0   Stairs Score 0   Barthel Index Score 40   Additional Treatment Session   Start Time 1314   End Time 1324   Treatment Assessment Pt agreeable to PT treatment session following PT evaluation. Pt performed seated therapeutic exercise as indicated below. Pt required verbal cues for correct technique and form. Pt tolerated therapeutic exercise well without complaints of increased pain. Pt continues to exhibit decreased lower extremity strength, impaired balance, decreased endurance, gait deviations, and decreased functional mobility. PT to continue to recommend level 2, moderate resource intensity. PT to continue to follow and treat as appropriate.   Exercises   Quad Sets Sitting;10 reps;AROM;Bilateral  (long  sitting)   Glute Sets Sitting;10 reps;AROM;Bilateral   Hip Abduction Sitting;10 reps;AROM;Bilateral  (long sitting)   Ankle Pumps Sitting;10 reps;AROM;Bilateral   End of Consult   Patient Position at End of Consult Bedside chair;Bed/Chair alarm activated;All needs within reach     PT Evaluation Time: 0843-0853; 1449-1635    PT Treatment Time: 7507-0013  10 minutes  Aleksandra Noland, PT, DPT

## 2024-08-07 NOTE — ASSESSMENT & PLAN NOTE
Patient presented to the ED after sustaining a fall at home when her legs gave out as she attempted to sit on the toilet.  With complaints of bilateral shoulder pain on admission  Recently discharged 5 days ago after being treated for CHF  Trauma imaging negative for acute fracture or displacement  PT/OT Evaluation

## 2024-08-07 NOTE — PROGRESS NOTES
[facility]  Progress Note  Name: Marisol Otoole I  MRN: [id number]  Unit/Bed#: 2 E 259-01 I Date of Admission: 8/6/2024   Date of Service: 8/7/2024 I Hospital Day: 0    Assessment & Plan   * Fall  Assessment & Plan  Patient presented to the ED after sustaining a fall at home when her legs gave out as she attempted to sit on the toilet.  With complaints of bilateral shoulder pain on admission  Recently discharged 5 days ago after being treated for CHF  Trauma imaging negative for acute fracture or displacement  PT/OT Evaluation    Hypomagnesemia  Assessment & Plan  Present on admission, 1.6  Recheck pending for this morning    Paroxysmal A-fib (HCC)  Assessment & Plan  Known history  Unable to tolerate rate/rhythm controlling agents due to underlying bradycardia  Currently bradycardic, 40-50s.  Continue to monitor on telemetry  Typically takes coumadin - 2.5 mg daily Monday-Saturday and 5 mg on Sunday.  Supratherapeutic INR on admission, 5.90  Check INR today; goal INR 2-3    Leucocytosis  Assessment & Plan  Persistent mild elevation noted since end of July  Will check procalcitonin level this morning  Low suspicion for infectious etiology    ERIN (acute kidney injury) (HCC)  Assessment & Plan  Present on admission, evidenced by creatinine of 1.60  Likely secondary to dehydration, poor oral intake.  Recent admission for CHF exacerbation.  Noted to be hypotensive on admission as well.  Received 2 L IVF Boluses + 12 hours of IVF  Creatinine has improved to baseline, 0.88  Will hold off on home Lasix today.    Chronic hypoxemic respiratory failure (HCC)  Assessment & Plan  Chronic  Currently at baseline    Depression with anxiety  Assessment & Plan  Mood stable  Continue with Zoloft    S/P TAVR (transcatheter aortic valve replacement)  Assessment & Plan  Noted history  Echo ordered as patient presented with bradycardia/hypotension    Gastroesophageal reflux disease without esophagitis  Assessment  & Plan  Continue PPI    Hyperlipidemia  Assessment & Plan  Continue statin    Acquired hypothyroidism  Assessment & Plan  TSH 1.252  Continue Levothyroxine 50 mcg daily           VTE Pharmacologic Prophylaxis:    Takes Coumadin chronically, on hold due to supratherapeutic INR    Mobility:   Basic Mobility Inpatient Raw Score: 12  -HLM Goal: 4: Move to chair/commode  JH-HLM Achieved: 5: Stand (1 or more minutes)  JH-HLM Goal NOT achieved. Continue with multidisciplinary rounding and encourage appropriate mobility to improve upon JH-HLM goals.    Patient Centered Rounds: I performed bedside rounds with nursing staff today.   Discussions with Specialists or Other Care Team Provider: Case Management, Cardiology    Education and Discussions with Family / Patient: Patient declined call to .     Total Time Spent on Date of Encounter in care of patient: 38 mins. This time was spent on one or more of the following: performing physical exam; counseling and coordination of care; obtaining or reviewing history; documenting in the medical record; reviewing/ordering tests, medications or procedures; communicating with other healthcare professionals and discussing with patient's family/caregivers.    Current Length of Stay: 0 day(s)  Current Patient Status: Observation   Certification Statement: The patient will continue to require additional inpatient hospital stay due to ongoing treatment in setting of cardiac work up due to bradycardia/hypotension/fall.  Discharge Plan: Anticipate discharge in 24-48 hrs to discharge location to be determined pending rehab evaluations.    Code Status: Level 3 - DNAR and DNI    Subjective:   Patient resting in bed, denies complaints of chest pain, shortness of breath, fever or chills.  She denies any dizziness or lightheadedness at this time.  Reports right foot is sore, bruising.    Objective:     Vitals:   Temp (24hrs), Av.1 °F (36.7 °C), Min:97.7 °F (36.5 °C), Max:98.5 °F  (36.9 °C)    Temp:  [97.7 °F (36.5 °C)-98.5 °F (36.9 °C)] 98.5 °F (36.9 °C)  HR:  [41-54] 46  Resp:  [16-22] 16  BP: ()/(42-71) 108/71  SpO2:  [94 %-98 %] 95 %  Body mass index is 44.92 kg/m².     Input and Output Summary (last 24 hours):     Intake/Output Summary (Last 24 hours) at 8/7/2024 1005  Last data filed at 8/7/2024 0410  Gross per 24 hour   Intake 2380 ml   Output 2000 ml   Net 380 ml       Physical Exam:   Physical Exam  Vitals and nursing note reviewed.   Constitutional:       General: She is not in acute distress.     Appearance: She is obese. She is not ill-appearing.   Cardiovascular:      Rate and Rhythm: Normal rate.      Pulses: Normal pulses.      Heart sounds: Normal heart sounds.   Pulmonary:      Effort: Pulmonary effort is normal. No tachypnea, bradypnea or respiratory distress.      Breath sounds: Decreased breath sounds present.      Comments: 3 L via NC, 97%  Abdominal:      General: Bowel sounds are normal. There is no distension.      Palpations: Abdomen is soft.      Tenderness: There is no abdominal tenderness.   Musculoskeletal:         General: Normal range of motion.      Right lower leg: Edema present.      Left lower leg: No edema.   Skin:     General: Skin is warm and dry.      Capillary Refill: Capillary refill takes less than 2 seconds.      Findings: Bruising (Right foot) present.   Neurological:      Mental Status: She is alert and oriented to person, place, and time.   Psychiatric:         Mood and Affect: Mood normal.          Additional Data:     Labs:  Results from last 7 days   Lab Units 08/07/24  0518 08/06/24  1428 08/06/24  0924   WBC Thousand/uL 12.31*  --  13.13*   HEMOGLOBIN g/dL 9.2*  --  10.3*   HEMATOCRIT % 28.4*  --  32.4*   PLATELETS Thousands/uL 331   < > 363   SEGS PCT %  --   --  79*   LYMPHO PCT %  --   --  8*   MONO PCT %  --   --  8   EOS PCT %  --   --  4    < > = values in this interval not displayed.     Results from last 7 days   Lab Units  08/07/24  0518   SODIUM mmol/L 137   POTASSIUM mmol/L 4.2   CHLORIDE mmol/L 103   CO2 mmol/L 27   BUN mg/dL 39*   CREATININE mg/dL 0.88   ANION GAP mmol/L 7   CALCIUM mg/dL 8.4   GLUCOSE RANDOM mg/dL 106     Results from last 7 days   Lab Units 08/07/24  0914   INR  5.17*     Results from last 7 days   Lab Units 08/06/24  0822   POC GLUCOSE mg/dl 114         Results from last 7 days   Lab Units 08/07/24  0518 08/01/24  0515   PROCALCITONIN ng/ml 0.12 0.07       Lines/Drains:  Invasive Devices       Peripheral Intravenous Line  Duration             Peripheral IV 08/06/24 Right Antecubital 1 day              Drain  Duration             External Urinary Catheter 1 day                      Telemetry:  Telemetry Orders (From admission, onward)               24 Hour Telemetry Monitoring  Continuous x 24 Hours (Telem)        Question:  Reason for 24 Hour Telemetry  Answer:  Syncope suspected to be cardiac in origin                     Telemetry Reviewed: Sinus Bradycardia  Indication for Continued Telemetry Use: Arrthymias requiring medical therapy             Imaging: No pertinent imaging reviewed.    Recent Cultures (last 7 days):   Results from last 7 days   Lab Units 08/06/24  1917   BLOOD CULTURE  Received in Microbiology Lab. Culture in Progress.       Last 24 Hours Medication List:   Current Facility-Administered Medications   Medication Dose Route Frequency Provider Last Rate    acetaminophen  650 mg Oral Q6H PRN MABEL Estrada      aspirin  81 mg Oral Daily Porter Carter MD      heparin (porcine)  5,000 Units Subcutaneous Q8H [facility] Porter Carter MD      levothyroxine  50 mcg Oral Daily Porter Carter MD      oxybutynin  5 mg Oral Daily Porter Carter MD      pantoprazole  40 mg Oral Early Morning Porter Carter MD      pravastatin  80 mg Oral Daily With Dinner Porter Carter MD      pregabalin  25 mg Oral HS Porter Carter MD      sertraline  100 mg Oral Daily Porter Carter MD          Today, Patient Was Seen By:  MABEL Perez    **Please Note: This note may have been constructed using a voice recognition system.**

## 2024-08-07 NOTE — PLAN OF CARE
Problem: PAIN - ADULT  Goal: Verbalizes/displays adequate comfort level or baseline comfort level  Description: Interventions:  - Encourage patient to monitor pain and request assistance  - Assess pain using appropriate pain scale  - Administer analgesics based on type and severity of pain and evaluate response  - Implement non-pharmacological measures as appropriate and evaluate response  - Consider cultural and social influences on pain and pain management  - Notify physician/advanced practitioner if interventions unsuccessful or patient reports new pain  Outcome: Progressing     Problem: INFECTION - ADULT  Goal: Absence or prevention of progression during hospitalization  Description: INTERVENTIONS:  - Assess and monitor for signs and symptoms of infection  - Monitor lab/diagnostic results  - Monitor all insertion sites, i.e. indwelling lines, tubes, and drains  - Monitor endotracheal if appropriate and nasal secretions for changes in amount and color  - [place] appropriate cooling/warming therapies per order  - Administer medications as ordered  - Instruct and encourage patient and family to use good hand hygiene technique  - Identify and instruct in appropriate isolation precautions for identified infection/condition  Outcome: Progressing  Goal: Absence of fever/infection during neutropenic period  Description: INTERVENTIONS:  - Monitor WBC    Outcome: Progressing     Problem: SAFETY ADULT  Goal: Patient will remain free of falls  Description: INTERVENTIONS:  - Educate patient/family on patient safety including physical limitations  - Instruct patient to call for assistance with activity   - Consult OT/PT to assist with strengthening/mobility   - Keep Call bell within reach  - Keep bed low and locked with side rails adjusted as appropriate  - Keep care items and personal belongings within reach  - Initiate and maintain comfort rounds  - Make Fall Risk Sign visible to staff  - Offer Toileting every  Hours,  in advance of need  - Initiate/Maintain alarm  - Obtain necessary fall risk management equipment:   - Apply yellow socks and bracelet for high fall risk patients  - Consider moving patient to room near nurses station  Outcome: Progressing  Goal: Maintain or return to baseline ADL function  Description: INTERVENTIONS:  -  Assess patient's ability to carry out ADLs; assess patient's baseline for ADL function and identify physical deficits which impact ability to perform ADLs (bathing, care of mouth/teeth, toileting, grooming, dressing, etc.)  - Assess/evaluate cause of self-care deficits   - Assess range of motion  - Assess patient's mobility; develop plan if impaired  - Assess patient's need for assistive devices and provide as appropriate  - Encourage maximum independence but intervene and supervise when necessary  - Involve family in performance of ADLs  - Assess for home care needs following discharge   - Consider OT consult to assist with ADL evaluation and planning for discharge  - Provide patient education as appropriate  Outcome: Progressing  Goal: Maintains/Returns to pre admission functional level  Description: INTERVENTIONS:  - Perform AM-PAC 6 Click Basic Mobility/ Daily Activity assessment daily.  - Set and communicate daily mobility goal to care team and patient/family/caregiver.   - Collaborate with rehabilitation services on mobility goals if consulted  - Perform Range of Motion  times a day.  - Reposition patient every  hours.  - Dangle patient  times a day  - Stand patient  times a day  - Ambulate patient  times a day  - Out of bed to chair  times a day   - Out of bed for meals times a day  - Out of bed for toileting  - Record patient progress and toleration of activity level   Outcome: Progressing     Problem: DISCHARGE PLANNING  Goal: Discharge to home or other facility with appropriate resources  Description: INTERVENTIONS:  - Identify barriers to discharge w/patient and caregiver  - Arrange for  needed discharge resources and transportation as appropriate  - Identify discharge learning needs (meds, wound care, etc.)  - Arrange for interpretive services to assist at discharge as needed  - Refer to Case Management Department for coordinating discharge planning if the patient needs post-hospital services based on physician/advanced practitioner order or complex needs related to functional status, cognitive ability, or social support system  Outcome: Progressing     Problem: Knowledge Deficit  Goal: Patient/family/caregiver demonstrates understanding of disease process, treatment plan, medications, and discharge instructions  Description: Complete learning assessment and assess knowledge base.  Interventions:  - Provide teaching at level of understanding  - Provide teaching via preferred learning methods  Outcome: Progressing

## 2024-08-07 NOTE — PLAN OF CARE
Problem: OCCUPATIONAL THERAPY ADULT  Goal: Performs self-care activities at highest level of function for planned discharge setting.  See evaluation for individualized goals.  Description: Treatment Interventions: ADL retraining, Functional transfer training, UE strengthening/ROM, Endurance training, Patient/family training, Equipment evaluation/education, Compensatory technique education, Energy conservation, Activityengagement          See flowsheet documentation for full assessment, interventions and recommendations.   Note: Limitation: Decreased ADL status, Decreased UE ROM, Decreased UE strength, Decreased cognition, Decreased self-care trans, Decreased high-level ADLs, Decreased endurance  Prognosis: Good  Assessment: Pt is a 83 y.o. female  who presents to [facility] on 8/6/2024  with Fall. Pt's PMH indicates acquired hypothyroidism, HLD, GERD, s/p TAVR, depression with anxiety, chronic hypoxemic respiratory failure, ERIN, leucocytosis, paroxysmal A-Fib, hypomagnesia, ambulatory dysfunction, and bradycardia. Orders placed for OT eval and treat, up and OOB as tolerated. At baseline, pt lives in 1 story house with friend. Pt reports prior to admission being independent with ADLs and requiring some assistance with IADLs. Upon initial evaluation, personal factors affecting pt include anxiety, high fall risk, limited community mobility, decreased ability to complete ADLs, and decreased ability to complete IADLs. Functional assessment indicates pt requires Max A with toileting, Mod A with LB ADLs and UB dressing, Min A with UB bathing and grooming, and supervision with feeding. Pt requires Min A x 1 for bed mobility, functional transfers, and mobility. Pt deficits at this time include decreased functional mobility, decreased BUE ROM , decreased BUE strength , increased pain , decreased endurance , impaired balance , and decreased functional reach . These deficits have an impact on pt's ability to  participate in daily activities. Throughout session, pt required verbal cueing from therapist to address sequencing and proper body positioning during ADLs. Occupational performance areas to address include grooming, dressing, bathing, toileting, functional mobility , bed mobility , and functional transfers. Pt would benefit from continued skilled OT services while hospitalized to address the above impairments and activity limitations to improve functional independence with meaningful activity. From an OT standpoint, d/c recommendation at this time is a level II moderate resource intensity .     Rehab Resource Intensity Level, OT: II (Moderate Resource Intensity)     FARRAH Blake

## 2024-08-07 NOTE — ASSESSMENT & PLAN NOTE
Persistent mild elevation noted since end of July  Will check procalcitonin level this morning  Low suspicion for infectious etiology

## 2024-08-07 NOTE — OCCUPATIONAL THERAPY NOTE
Occupational Therapy Evaluation     Patient Name: Marisol Otoole  Today's Date: 8/7/2024  Problem List  Principal Problem:    Fall  Active Problems:    Acquired hypothyroidism    Hyperlipidemia    Gastroesophageal reflux disease without esophagitis    S/P TAVR (transcatheter aortic valve replacement)    Depression with anxiety    Chronic hypoxemic respiratory failure (HCC)    ERIN (acute kidney injury) (HCC)    Leucocytosis    Paroxysmal A-fib (HCC)    Hypomagnesemia    Past Medical History  Past Medical History:   Diagnosis Date    Anemia 08/22/2018    Anxiety     Arthritis     AVB (atrioventricular block)     first degree    Cataract     CHF (congestive heart failure) (HCC)     COPD, mild (HCC)     Coronary artery disease     Dislocation of right shoulder joint     Frequent UTI     GERD (gastroesophageal reflux disease)     H/O: pneumonia     Heme positive stool     Hyperlipidemia     Hypertension     Hypothyroidism     Morbid obesity with BMI of 50.0-59.9, adult (HCC)     Obesity, morbid (HCC) 08/22/2018    JANICE on CPAP     Pulmonary hypertension (HCC) 08/22/2018    Severe aortic stenosis     Simple goiter     Skin cyst     within the armpits, right    Wears glasses      Past Surgical History  Past Surgical History:   Procedure Laterality Date    BREAST BIOPSY      CARDIAC CATHETERIZATION      CARPAL TUNNEL RELEASE Bilateral     CHOLECYSTECTOMY      DILATION AND CURETTAGE OF UTERUS      HYSTEROSCOPY      MASTOID SURGERY      NE COLONOSCOPY FLX DX W/COLLJ SPEC WHEN PFRMD N/A 9/6/2018    Procedure: COLONOSCOPY;  Surgeon: Shree Sosa III, MD;  Location: [facility];  Service: Gastroenterology    NE ECHO TRANSESOPHAG R-T 2D W/PRB IMG ACQUISJ I&R N/A 10/9/2018    Procedure: INTRA-OP TRANSESOPHAGEAL ECHOCARDIOGRAM (GARRISON);  Surgeon: Kushal Camarena DO;  Location:  MAIN OR;  Service: Cardiac Surgery    NE ESOPHAGOGASTRODUODENOSCOPY TRANSORAL DIAGNOSTIC N/A 8/31/2018    Procedure: ESOPHAGOGASTRODUODENOSCOPY (EGD);   Surgeon: Shree Sosa III, MD;  Location: [facility];  Service: Gastroenterology    RI REPLACE AORTIC VALVE OPENFEMORAL ARTERY APPROACH N/A 10/9/2018    Procedure: REPLACEMENT AORTIC VALVE TRANSCATHETER (TAVR) TRANSFEMORAL W/ 23 MM MENDOZA NOE S3 VALVE (ACCESS OF LEFT);  Surgeon: Kushal Camarena DO;  Location: [facility];  Service: Cardiac Surgery    TOTAL HIP ARTHROPLASTY Left 2007    TOTAL KNEE ARTHROPLASTY Bilateral              08/07/24 0844   OT Last Visit   OT Visit Date 08/07/24   Note Type   Note type Evaluation   Additional Comments Pt presented supine in bed. Pt agreeable to OT eval. Obtained home setup and PLOF @ 8:44-8:52 am. Mobility completed from 12:57-13:13pm.   Pain Assessment   Pain Assessment Tool 0-10   Pain Score 7   Pain Location/Orientation Location: Generalized   Pain Onset/Description Descriptor: [facility] Pain Intervention(s) Medication (See MAR);Repositioned;Ambulation/increased activity   Multiple Pain Sites Yes   Pain 2   Ocampo-Kumar FACES Pain Rating 2 4  (pt did not provide number)   Pain Location/Orientation 2 Orientation: Right;Location: Foot   Pain Onset/Description 2 Onset: Ongoing  (hurts to the touch on top of foot per pt report)   Restrictions/Precautions   Weight Bearing Precautions Per Order No   Other Precautions Chair Alarm;Bed Alarm;Multiple lines;Telemetry;O2;Fall Risk;Pain  (3 L O2 via NC)   Home Living   Type of Home House   Home Layout One level;Able to live on main level with bedroom/bathroom;Performs ADLs on one level  (no REGINO)   Bathroom Shower/Tub Walk-in shower   Bathroom Toilet Raised   Bathroom Equipment Grab bars in shower;Shower chair   Bathroom Accessibility Accessible   Home Equipment Walker;Cane;Other (Comment)  (home O2 2L prn throuhgout the day; CPAP used at night; pt ambulates with RW at baseline.)   Prior Function   Level of [place] Independent with functional mobility;Independent with ADLs;Independent with IADLS   Lives With  Friend(s)   Receives Help From Friend(s);Home health  (pt receives home nurse and therapy)   IADLs Family/Friend/Other provides transportation;Independent with meal prep;Independent with medication management   Falls in the last 6 months 1 to 4  (1 fall PTA, 3 falls total)   Vocational Retired   Lifestyle   Autonomy At baseline, pt lives in 1 story house with friend. Pt reports prior to admission being independent with ADLs and requiring some assistance with IADLs. Upon initial evaluation, personal factors affecting pt include anxiety, high fall risk, limited community mobility, decreased ability to complete ADLs, and decreased ability to complete IADLs.   Reciprocal Relationships Support from friends   Service to Others Retired   General   Family/Caregiver Present Yes  (friend umer)   ADL   Eating Assistance 5  Supervision/Setup   Grooming Assistance 4  Minimal Assistance   UB Bathing Assistance 4  Minimal Assistance   LB Bathing Assistance 3  Moderate Assistance   UB Dressing Assistance 3  Moderate Assistance   LB Dressing Assistance 3  Moderate Assistance   Toileting Assistance  2  Maximal Assistance  (Pt required clothing management, perineal hygiene, and steadying during toileting on standard toilet)   Additional Comments ADL levels based on pt functional performance during eval.   Bed Mobility   Supine to Sit 4  Minimal assistance   Additional items Assist x 1;Bedrails;Increased time required;Verbal cues   Sit to Supine   (DNT; pt OOB in recliner at end of session.)   Additional Comments Pt denied any lightheadedness. Pt required verbal cues for proper body positioning.   Transfers   Sit to Stand 4  Minimal assistance   Additional items Assist x 1;Increased time required;Verbal cues   Stand to Sit 4  Minimal assistance   Additional items Armrests;Increased time required;Verbal cues   Toilet transfer 4  Minimal assistance   Additional items Assist x 1;Increased time required;Verbal cues;Standard  toilet;Armrests   Additional Comments Pt completed 2 STS transfers, bed -> RW, toilet -> RW. Pt required verbal cueing for hand placement and proper body positioning. Pt denied any dizziness.   Functional Mobility   Functional Mobility 4  Minimal assistance   Additional Comments Assist x 1; Pt ambulated to/from bathroom with use of RW. Pt required verbal cues for proper body positioning and sequencing the RW. Pt denied SOB or dizziness. Pt O2 stable ~ 88-94% throughout session on RA. Pt seated in recliner at end of session. Pt O2 increased to 95% at end of session with 3L O2 via NC.   Additional items Rolling walker   Balance   Static Sitting Fair +   Dynamic Sitting Fair   Static Standing Fair -   Dynamic Standing Poor +   Activity Tolerance   Activity Tolerance Patient limited by fatigue;Patient limited by pain   Medical Staff Made Aware Due to pt's medical complexity, co-eval indicated with PT Marcie to monitor pt status and adjust skilled intervention as necessary to fit the pt's needs.   Nurse Made Aware RN made aware.   RUE Assessment   RUE Assessment X  (limited shoulder ROM ~ 100 degrees, grossly 3+/5 MMT; pt reports she has difficulty brushing hair due to shoulder pain.)   LUE Assessment   LUE Assessment X  (limited shoulder ROM ~ 100 degrees, grossly 3+/5 MMT; pt reports she has difficulty brushing hair due to shoulder pain.)   Hand Function   Gross Motor Coordination Functional   Fine Motor Coordination Functional   Sensation   Light Touch No apparent deficits   Vision-Basic Assessment   Current Vision Wears glasses all the time   Cognition   Overall Cognitive Status WFL   Arousal/Participation Alert;Responsive;Cooperative   Attention Within functional limits   Orientation Level Oriented X4   Memory Within functional limits   Following Commands Follows all commands and directions without difficulty   Assessment   Limitation Decreased ADL status;Decreased UE ROM;Decreased UE strength;Decreased  cognition;Decreased self-care trans;Decreased high-level ADLs;Decreased endurance   Prognosis Good   Assessment Pt is a 83 y.o. female  who [facility]  with Fall. Pt's PMH indicates acquired hypothyroidism, HLD, GERD, s/p TAVR, depression with anxiety, chronic hypoxemic respiratory failure, ERIN, leucocytosis, paroxysmal A-Fib, hypomagnesia, ambulatory dysfunction, and bradycardia. Orders placed for OT eval and treat, up and OOB as tolerated. At baseline, pt lives in 1 story house with friend. Pt reports prior to admission being independent with ADLs and requiring some assistance with IADLs. Upon initial evaluation, personal factors affecting pt include anxiety, high fall risk, limited community mobility, decreased ability to complete ADLs, and decreased ability to complete IADLs. Functional assessment indicates pt requires Max A with toileting, Mod A with LB ADLs and UB dressing, Min A with UB bathing and grooming, and supervision with feeding. Pt requires Min A x 1 for bed mobility, functional transfers, and mobility. Pt deficits at this time include decreased functional mobility, decreased BUE ROM , decreased BUE strength , increased pain , decreased endurance , impaired balance , and decreased functional reach . These deficits have an impact on pt's ability to participate in daily activities. Throughout session, pt required verbal cueing from therapist to address sequencing and proper body positioning during ADLs. Occupational performance areas to address include grooming, dressing, bathing, toileting, functional mobility , bed mobility , and functional transfers. Pt would benefit from continued skilled OT services while hospitalized to address the above impairments and activity limitations to improve functional independence with meaningful activity. From an OT standpoint, d/c recommendation at this time is a level II moderate resource intensity .   Goals   Patient Goals to  use the bathroom   Plan   Treatment Interventions ADL retraining;Functional transfer training;UE strengthening/ROM;Endurance training;Patient/family training;Equipment evaluation/education;Compensatory technique education;Energy conservation;Activityengagement   Goal Expiration Date 08/22/24   OT Treatment Day 0   OT Frequency 3-5x/wk   Discharge Recommendation   Rehab Resource Intensity Level, OT II (Moderate Resource Intensity)   AM-PAC Daily Activity Inpatient   Lower Body Dressing 2   Bathing 2   Toileting 1   Upper Body Dressing 2   Grooming 3   Eating 3   Daily Activity Raw Score 13   Daily Activity Standardized Score (Calc for Raw Score >=11) 32.03   AM-PAC Applied Cognition Inpatient   Following a Speech/Presentation 4   Understanding Ordinary Conversation 4   Taking Medications 3   Remembering Where Things Are Placed or Put Away 4   Remembering List of 4-5 Errands 4   Taking Care of Complicated Tasks 3   Applied Cognition Raw Score 22   Applied Cognition Standardized Score 47.83   Barthel Index   Feeding 5   Bathing 0   Grooming Score 0   Dressing Score 5   Bladder Score 0   Bowels Score 10   Toilet Use Score 5   Transfers (Bed/Chair) Score 5   Mobility (Level Surface) Score 0   Stairs Score 0   Barthel Index Score 30   Modified Yancy Scale   Modified Kay Scale 4   End of Consult   Patient Position at End of Consult Bedside chair;Bed/Chair alarm activated;All needs within reach   Nurse Communication Nurse aware of consult     GOALS    Pt will complete bed mobility with supervision in order to prepare self for OOB/EOB ADL tasks.     Pt will complete functional transfers including a toilet transfer, with supervision in order to prepare self for ADL tasks.    Pt will complete UB ADLs, including dressing, independently  in order to increase functional independence with feeding, grooming, and bathing.    Pt will complete LB ADLs with Min A in order to increase functional independence with meaningful  activities.     Pt will complete toileting hygiene tasks with Min A in order to increase ability to perform self-care management tasks.     Pt will increase standing tolerance to 10 min. in order to decrease fall risk and increase endurance to engage in upright ADL tasks.     Pt will utilize and/or verbalize 3 energy conservation techniques in order to increase activity tolerance for functional tasks.     Pt will engage in UE therapeutic exercise for 10 min in order to increase strength and endurance for purposeful tasks.     Rina Pozo OTS

## 2024-08-07 NOTE — CASE MANAGEMENT
Case Management Assessment & Discharge Planning Note    Patient name Marisol Otoole  Location 2 [facility] 259/2 E 259-01 MRN [id number]  : 1940 Date 2024       Current Admission Date: 2024  Current Admission Diagnosis:Fall   Patient Active Problem List    Diagnosis Date Noted Date Diagnosed    Hypomagnesemia 2024     Paroxysmal A-fib (HCC) 2024     Bradycardia 2024     Chronic anticoagulation 2024     Urinary frequency 2024     At risk for injury related to fall 2024     Walker as ambulation aid 2024     Ambulatory dysfunction 10/24/2023     Fall 2023     ERIN (acute kidney injury) ([facility]) 2023     Leucocytosis 2023     Orbital mass 2023     Stage 3a chronic kidney disease ([facility]) 2022     Radiculopathy, lumbar region 07/10/2021     Chronic hypoxemic respiratory failure ([facility]) 06/10/2021     Depression, recurrent ([facility]) 2021     Osteopenia 10/22/2020     Insomnia 2020     Depression with anxiety 2019     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/2018     Primary osteoarthritis of left shoulder      AVB (atrioventricular block)      Acute on chronic diastolic congestive heart failure (HCC)      COPD, mild ([facility])      Coronary artery disease involving native coronary artery of native heart without angina pectoris      Gastroesophageal reflux disease without esophagitis 2018     Iron deficiency anemia secondary to inadequate dietary iron intake 2018     Obesity, morbid ([facility]) 2018     Acute diastolic congestive heart failure (HCC) 2018     JANICE (obstructive sleep apnea) 2018     Hyperlipidemia 2017     Primary hypertension 2017     Acquired hypothyroidism 10/29/2017     LVH (left ventricular hypertrophy) 2016     Mitral annular calcification 2016     Mitral valve stenosis 2016     Pulmonary hypertension (HCC) 2016       LOS (days): 0  Geometric  Mean LOS (GMLOS) (days):   Days to GMLOS:     OBJECTIVE:     Current admission status: Observation     Preferred Pharmacy:   [facility] - [address]  [address]  [place] [address]  Phone: [phone] Fax: [phone]    Primary Care Provider: MABEL Hallman  Primary Insurance: MEDICARE  Secondary Insurance:     ASSESSMENT:  Active Health Care Proxies       Yuridia Hurtado Health Care Agent - Friend   Primary Phone: [phone] (Mobile)                 Advance Directives  Does patient have a Health Care POA?: Yes  Does patient have Advance Directives?: Yes  Advance Directives: Power of  for health care  Primary Contact: Yuridia Hurtado (friend/POA/proxy) [phone]    Readmission Root Cause  30 Day Readmission: No (OBS)    Patient Information  Admitted from:: Home  Mental Status: Alert  During Assessment patient was accompanied by: Not accompanied during assessment  Assessment information provided by:: Patient  Primary Caregiver: Self  Support Systems: Self, Friends/neighbors  County of Residence: [place]  What city do you live in?: [place]  Home entry access options. Select all that apply.: No steps to enter home  Type of Current Residence: 2 story home  Upon entering residence, is there a bedroom on the main floor (no further steps)?: Yes  Upon entering residence, is there a bathroom on the main floor (no further steps)?: Yes  Living Arrangements: Lives w/ Friend  Is patient a ?: No    Activities of Daily Living Prior to Admission  Functional Status: Independent  Completes ADLs independently?: Yes  Ambulates independently?: Yes  Does patient currently own DME?: Yes  What DME does the patient currently own?: Walker, Shower Chair, Home Oxygen concentrator  Does patient have a history of Outpatient Therapy (PT/OT)?: No  Does patient have a history of HHC?: Yes  Does patient currently have HHC?: Yes    Current Home Health Care  Type of  Current Home Care Services: Home PT, Home OT  Current Home Health Agency:: Other (please enter name in comment) ([facility]idential)  Current Home Health Follow-Up Provider:: PCP (MABEL Hallman)    Patient Information Continued  Income Source: SSI/SSD  Does patient receive dialysis treatments?: No  Does patient have a history of substance abuse?: No  Does patient have a history of Mental Health Diagnosis?: No    PHQ 2/9 Screening   Reviewed PHQ 2/9 Depression Screening Score?: No    Means of Transportation  Means of Transport to Appts:: Friends      Social Determinants of Health (SDOH)      Flowsheet Row Most Recent Value   Housing Stability    In the last 12 months, was there a time when you were not able to pay the mortgage or rent on time? N   In the past 12 months, how many times have you moved where you were living? 0   At any time in the past 12 months, were you homeless or living in a shelter (including now)? N   Transportation Needs    In the past 12 months, has lack of transportation kept you from medical appointments or from getting medications? no   In the past 12 months, has lack of transportation kept you from meetings, work, or from getting things needed for daily living? No   Food Insecurity    Within the past 12 months, you worried that your food would run out before you got the money to buy more. Never true   Within the past 12 months, the food you bought just didn't last and you didn't have money to get more. Never true   Utilities    In the past 12 months has the electric, gas, oil, or water company threatened to shut off services in your home? No            DISCHARGE DETAILS:    Discharge planning discussed with:: Patient at bedside.  Freedom of Choice: Yes  Comments - Freedom of Choice: FOC maintained - CM reviewed DCP.  AMPAC is 12.  Patient declining rehab placement, preference is d/c home to [place] w/ Residential.  STR referral to remain open through to d/c as secondary plan.  [facility] confirmed  for [facility] upon d/c.  Patient denies any additional needs.  CM contacted family/caregiver?: Yes  Were Treatment Team discharge recommendations reviewed with patient/caregiver?: Yes  Did patient/caregiver verbalize understanding of patient care needs?: Yes  Were patient/caregiver advised of the risks associated with not following Treatment Team discharge recommendations?: Yes    Contacts  Patient Contacts: Yuridia (friend/POA)  Relationship to Patient:: Friend  Contact Method: Phone  Phone Number: [phone]  Reason/Outcome: Continuity of Care, Discharge Planning, Referral    CM reviewed AMPAC & anticipated LOC recs for patient.  Friend/POA aware of potential rehab rec.  States she is not concerned with patient returning home w/ HHC as patient has a live-in caregiver who is there most of the time.  STR declined if recommended.     Requested Home Health Care         Is the patient interested in HHC at discharge?: Yes  Home Health Discipline requested:: Occupational Therapy, Physical Therapy, Nursing  Home Health Agency Name:: [facility]idential  HHA External Referral Reason (only applicable if external HHA name selected): Patient has established relationship with provider  Home Health Follow-Up Provider:: PCP (MABEL Hallman)  Home Health Services Needed:: Evaluate Functional Status and Safety, Gait/ADL Training, Strengthening/Theraputic Exercises to Improve Function, Oxygen Via Nasal Cannula, Heart Failure Management, COPD Management  Oxygen LPM Ordered (if applicable based on home health services needed):: 2 LPM  Homebound Criteria Met:: Requires the Assistance of Another Person for Safe Ambulation or to Leave the Home, Uses an Assist Device (i.e. cane, walker, etc)  Supporting Clincal Findings:: Requires Oxygen, Fatigues Easliy in Short Distances, Limited Endurance    DME Referral Provided  Referral made for DME?: No    Other Referral/Resources/Interventions Provided:  Interventions: HHC, Short Term Rehab  Referral  Comments: See FOC.  Residential reserved for [facility].  STR referral open through to d/c as secondary plan.  Programs:: CHF, COPD    Would you like to participate in our [facility] service program?  : No - Declined    Treatment Team Recommendation: Home with Home Health Care, Short Term Rehab  Discharge Destination Plan:: Home with Home Health Care  Transport at Discharge : Family

## 2024-08-08 ENCOUNTER — CLINICAL SUPPORT (OUTPATIENT)
Dept: CARDIOLOGY CLINIC | Facility: CLINIC | Age: 84
End: 2024-08-08
Payer: MEDICARE

## 2024-08-08 ENCOUNTER — TRANSITIONAL CARE MANAGEMENT (OUTPATIENT)
Dept: FAMILY MEDICINE CLINIC | Facility: CLINIC | Age: 84
End: 2024-08-08

## 2024-08-08 ENCOUNTER — HOSPITAL ENCOUNTER (EMERGENCY)
Facility: HOSPITAL | Age: 84
Discharge: HOME HEALTH | End: 2024-08-09
Attending: EMERGENCY MEDICINE
Payer: MEDICARE

## 2024-08-08 ENCOUNTER — APPOINTMENT (EMERGENCY)
Dept: RADIOLOGY | Facility: HOSPITAL | Age: 84
End: 2024-08-08
Payer: MEDICARE

## 2024-08-08 ENCOUNTER — TELEPHONE (OUTPATIENT)
Dept: CARDIOLOGY CLINIC | Facility: CLINIC | Age: 84
End: 2024-08-08

## 2024-08-08 ENCOUNTER — APPOINTMENT (EMERGENCY)
Dept: CT IMAGING | Facility: HOSPITAL | Age: 84
End: 2024-08-08
Payer: MEDICARE

## 2024-08-08 VITALS
SYSTOLIC BLOOD PRESSURE: 119 MMHG | OXYGEN SATURATION: 96 % | TEMPERATURE: 97.9 F | BODY MASS INDEX: 45.18 KG/M2 | HEART RATE: 38 BPM | DIASTOLIC BLOOD PRESSURE: 47 MMHG | WEIGHT: 187.39 LBS | RESPIRATION RATE: 18 BRPM

## 2024-08-08 DIAGNOSIS — R00.1 BRADYCARDIA: Primary | ICD-10-CM
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
## 2024-08-08 DIAGNOSIS — Z79.01 CURRENT USE OF LONG TERM ANTICOAGULATION: ICD-10-CM

## 2024-08-08 DIAGNOSIS — R00.1 JUNCTIONAL BRADYCARDIA: Primary | ICD-10-CM

## 2024-08-08 PROBLEM — E83.42 HYPOMAGNESEMIA: Status: RESOLVED | Noted: 2024-08-06 | Resolved: 2024-08-08

## 2024-08-08 LAB
2HR DELTA HS TROPONIN: -1 NG/L
4HR DELTA HS TROPONIN: 1 NG/L
ANION GAP SERPL CALCULATED.3IONS-SCNC: 5 MMOL/L (ref 4–13)
ANION GAP SERPL CALCULATED.3IONS-SCNC: 8 MMOL/L (ref 4–13)
BASOPHILS # BLD AUTO: 0.05 THOUSANDS/ÂΜL (ref 0–0.1)
BASOPHILS NFR BLD AUTO: 0 % (ref 0–1)
BUN SERPL-MCNC: 32 MG/DL (ref 5–25)
BUN SERPL-MCNC: 36 MG/DL (ref 5–25)
CALCIUM SERPL-MCNC: 9 MG/DL (ref 8.4–10.2)
CALCIUM SERPL-MCNC: 9.4 MG/DL (ref 8.4–10.2)
CARDIAC TROPONIN I PNL SERPL HS: 37 NG/L
CARDIAC TROPONIN I PNL SERPL HS: 38 NG/L
CARDIAC TROPONIN I PNL SERPL HS: 39 NG/L
CHLORIDE SERPL-SCNC: 106 MMOL/L (ref 96–108)
CHLORIDE SERPL-SCNC: 106 MMOL/L (ref 96–108)
CO2 SERPL-SCNC: 27 MMOL/L (ref 21–32)
CO2 SERPL-SCNC: 30 MMOL/L (ref 21–32)
CREAT SERPL-MCNC: 0.79 MG/DL (ref 0.6–1.3)
CREAT SERPL-MCNC: 0.84 MG/DL (ref 0.6–1.3)
EOSINOPHIL # BLD AUTO: 0.58 THOUSAND/ÂΜL (ref 0–0.61)
EOSINOPHIL NFR BLD AUTO: 5 % (ref 0–6)
ERYTHROCYTE [DISTWIDTH] IN BLOOD BY AUTOMATED COUNT: 14.7 % (ref 11.6–15.1)
ERYTHROCYTE [DISTWIDTH] IN BLOOD BY AUTOMATED COUNT: 15 % (ref 11.6–15.1)
GFR SERPL CREATININE-BSD FRML MDRD: 64 ML/MIN/1.73SQ M
GFR SERPL CREATININE-BSD FRML MDRD: 68 ML/MIN/1.73SQ M
GLUCOSE SERPL-MCNC: 132 MG/DL (ref 65–140)
GLUCOSE SERPL-MCNC: 99 MG/DL (ref 65–140)
HCT VFR BLD AUTO: 29.6 % (ref 34.8–46.1)
HCT VFR BLD AUTO: 34 % (ref 34.8–46.1)
HGB BLD-MCNC: 10.2 G/DL (ref 11.5–15.4)
HGB BLD-MCNC: 9.1 G/DL (ref 11.5–15.4)
IMM GRANULOCYTES # BLD AUTO: 0.07 THOUSAND/UL (ref 0–0.2)
IMM GRANULOCYTES NFR BLD AUTO: 1 % (ref 0–2)
INR PPP: 3.49 (ref 0.85–1.19)
INR PPP: 3.72 (ref 0.85–1.19)
LYMPHOCYTES # BLD AUTO: 1.5 THOUSANDS/ÂΜL (ref 0.6–4.47)
LYMPHOCYTES NFR BLD AUTO: 13 % (ref 14–44)
MAGNESIUM SERPL-MCNC: 1.9 MG/DL (ref 1.9–2.7)
MAGNESIUM SERPL-MCNC: 2.1 MG/DL (ref 1.9–2.7)
MCH RBC QN AUTO: 26.9 PG (ref 26.8–34.3)
MCH RBC QN AUTO: 27.2 PG (ref 26.8–34.3)
MCHC RBC AUTO-ENTMCNC: 30 G/DL (ref 31.4–37.4)
MCHC RBC AUTO-ENTMCNC: 30.7 G/DL (ref 31.4–37.4)
MCV RBC AUTO: 88 FL (ref 82–98)
MCV RBC AUTO: 90 FL (ref 82–98)
MONOCYTES # BLD AUTO: 0.76 THOUSAND/ÂΜL (ref 0.17–1.22)
MONOCYTES NFR BLD AUTO: 7 % (ref 4–12)
NEUTROPHILS # BLD AUTO: 8.41 THOUSANDS/ÂΜL (ref 1.85–7.62)
NEUTS SEG NFR BLD AUTO: 74 % (ref 43–75)
NRBC BLD AUTO-RTO: 0 /100 WBCS
PLATELET # BLD AUTO: 337 THOUSANDS/UL (ref 149–390)
PLATELET # BLD AUTO: 393 THOUSANDS/UL (ref 149–390)
PMV BLD AUTO: 9.7 FL (ref 8.9–12.7)
PMV BLD AUTO: 9.7 FL (ref 8.9–12.7)
POTASSIUM SERPL-SCNC: 4.2 MMOL/L (ref 3.5–5.3)
POTASSIUM SERPL-SCNC: 4.4 MMOL/L (ref 3.5–5.3)
PROCALCITONIN SERPL-MCNC: 0.08 NG/ML
PROTHROMBIN TIME: 35.6 SECONDS (ref 12.3–15)
PROTHROMBIN TIME: 37.3 SECONDS (ref 12.3–15)
RBC # BLD AUTO: 3.35 MILLION/UL (ref 3.81–5.12)
RBC # BLD AUTO: 3.79 MILLION/UL (ref 3.81–5.12)
SODIUM SERPL-SCNC: 141 MMOL/L (ref 135–147)
SODIUM SERPL-SCNC: 141 MMOL/L (ref 135–147)
WBC # BLD AUTO: 10.94 THOUSAND/UL (ref 4.31–10.16)
WBC # BLD AUTO: 11.37 THOUSAND/UL (ref 4.31–10.16)

## 2024-08-08 PROCEDURE — 83735 ASSAY OF MAGNESIUM: CPT | Performed by: NURSE PRACTITIONER

## 2024-08-08 PROCEDURE — 80048 BASIC METABOLIC PNL TOTAL CA: CPT | Performed by: EMERGENCY MEDICINE

## 2024-08-08 PROCEDURE — 99285 EMERGENCY DEPT VISIT HI MDM: CPT

## 2024-08-08 PROCEDURE — 70450 CT HEAD/BRAIN W/O DYE: CPT

## 2024-08-08 PROCEDURE — 85027 COMPLETE CBC AUTOMATED: CPT | Performed by: STUDENT IN AN ORGANIZED HEALTH CARE EDUCATION/TRAINING PROGRAM

## 2024-08-08 PROCEDURE — 99211 OFF/OP EST MAY X REQ PHY/QHP: CPT

## 2024-08-08 PROCEDURE — 84145 PROCALCITONIN (PCT): CPT | Performed by: NURSE PRACTITIONER

## 2024-08-08 PROCEDURE — 93005 ELECTROCARDIOGRAM TRACING: CPT

## 2024-08-08 PROCEDURE — 71045 X-RAY EXAM CHEST 1 VIEW: CPT

## 2024-08-08 PROCEDURE — 99232 SBSQ HOSP IP/OBS MODERATE 35: CPT | Performed by: INTERNAL MEDICINE

## 2024-08-08 PROCEDURE — 36415 COLL VENOUS BLD VENIPUNCTURE: CPT | Performed by: EMERGENCY MEDICINE

## 2024-08-08 PROCEDURE — 85610 PROTHROMBIN TIME: CPT | Performed by: EMERGENCY MEDICINE

## 2024-08-08 PROCEDURE — 84484 ASSAY OF TROPONIN QUANT: CPT | Performed by: EMERGENCY MEDICINE

## 2024-08-08 PROCEDURE — 85610 PROTHROMBIN TIME: CPT | Performed by: NURSE PRACTITIONER

## 2024-08-08 PROCEDURE — 99239 HOSP IP/OBS DSCHRG MGMT >30: CPT | Performed by: NURSE PRACTITIONER

## 2024-08-08 PROCEDURE — 99291 CRITICAL CARE FIRST HOUR: CPT | Performed by: EMERGENCY MEDICINE

## 2024-08-08 PROCEDURE — 85025 COMPLETE CBC W/AUTO DIFF WBC: CPT | Performed by: EMERGENCY MEDICINE

## 2024-08-08 PROCEDURE — 83735 ASSAY OF MAGNESIUM: CPT | Performed by: EMERGENCY MEDICINE

## 2024-08-08 PROCEDURE — 94640 AIRWAY INHALATION TREATMENT: CPT

## 2024-08-08 PROCEDURE — 80048 BASIC METABOLIC PNL TOTAL CA: CPT | Performed by: NURSE PRACTITIONER

## 2024-08-08 RX ORDER — FUROSEMIDE 20 MG/1
20 TABLET ORAL DAILY
Qty: 7 TABLET | Refills: 0 | Status: SHIPPED | OUTPATIENT
Start: 2024-08-09 | End: 2024-08-16

## 2024-08-08 RX ORDER — ACETAMINOPHEN 325 MG/1
650 TABLET ORAL ONCE
Status: COMPLETED | OUTPATIENT
Start: 2024-08-08 | End: 2024-08-08

## 2024-08-08 RX ORDER — ALBUTEROL SULFATE 0.83 MG/ML
2.5 SOLUTION RESPIRATORY (INHALATION) ONCE
Status: COMPLETED | OUTPATIENT
Start: 2024-08-08 | End: 2024-08-08

## 2024-08-08 RX ORDER — WARFARIN SODIUM 2.5 MG/1
TABLET ORAL
Qty: 102 TABLET | Refills: 0 | Status: SHIPPED | OUTPATIENT
Start: 2024-08-09

## 2024-08-08 RX ORDER — FUROSEMIDE 20 MG/1
20 TABLET ORAL DAILY
Status: DISCONTINUED | OUTPATIENT
Start: 2024-08-08 | End: 2024-08-08 | Stop reason: HOSPADM

## 2024-08-08 RX ADMIN — ACETAMINOPHEN 650 MG: 325 TABLET, FILM COATED ORAL at 23:09

## 2024-08-08 RX ADMIN — FUROSEMIDE 20 MG: 20 TABLET ORAL at 11:47

## 2024-08-08 RX ADMIN — PANTOPRAZOLE SODIUM 40 MG: 40 TABLET, DELAYED RELEASE ORAL at 05:39

## 2024-08-08 RX ADMIN — ASPIRIN 81 MG: 81 TABLET, COATED ORAL at 08:08

## 2024-08-08 RX ADMIN — SERTRALINE HYDROCHLORIDE 100 MG: 50 TABLET ORAL at 08:08

## 2024-08-08 RX ADMIN — OXYBUTYNIN CHLORIDE 5 MG: 5 TABLET, EXTENDED RELEASE ORAL at 08:08

## 2024-08-08 RX ADMIN — LEVOTHYROXINE SODIUM 50 MCG: 0.05 TABLET ORAL at 08:08

## 2024-08-08 RX ADMIN — HEPARIN SODIUM 5000 UNITS: 5000 INJECTION INTRAVENOUS; SUBCUTANEOUS at 05:39

## 2024-08-08 RX ADMIN — ALBUTEROL SULFATE 2.5 MG: 2.5 SOLUTION RESPIRATORY (INHALATION) at 17:21

## 2024-08-08 RX ADMIN — ALBUTEROL SULFATE 2.5 MG: 2.5 SOLUTION RESPIRATORY (INHALATION) at 23:09

## 2024-08-08 NOTE — EMTALA/ACUTE CARE TRANSFER
[facility] EMERGENCY DEPARTMENT  [address]  [place] [address]  Dept: [phone]      EMTALA TRANSFER CONSENT    NAME Marisol GARCIA 1940                              MRN [id number]    I have been informed of my rights regarding examination, treatment, and transfer   by Dr. Mecca Boyce    Benefits: Specialized equipment and/or services available at the receiving facility (Include comment)________________________ (pacemaker placement)    Risks: Potential for delay in receiving treatment, Potential deterioration of medical condition, Loss of IV, Increased discomfort during transfer, Possible worsening of condition or death during transfer      Consent for Transfer:  I acknowledge that my medical condition has been evaluated and explained to me by the emergency department physician or other qualified medical person and/or my attending physician, who has recommended that I be transferred to the service of  Accepting Physician: Dr Higgins at Accepting Facility Name, City & State : [facility]. The above potential benefits of such transfer, the potential risks associated with such transfer, and the probable risks of not being transferred have been explained to me, and I fully understand them.  The doctor has explained that, in my case, the benefits of transfer outweigh the risks.  I agree to be transferred.    I authorize the performance of emergency medical procedures and treatments upon me in both transit and upon arrival at the receiving facility.  Additionally, I authorize the release of any and all medical records to the receiving facility and request they be transported with me, if possible.  I understand that the safest mode of transportation during a medical emergency is an ambulance and that the Hospital advocates the use of this mode of transport. Risks of traveling to the receiving facility by car, including absence of  medical control, life sustaining equipment, such as oxygen, and medical personnel has been explained to me and I fully understand them.    (KATHLEEN CORRECT BOX BELOW)  [  ]  I consent to the stated transfer and to be transported by ambulance/helicopter.  [  ]  I consent to the stated transfer, but refuse transportation by ambulance and accept full responsibility for my transportation by car.  I understand the risks of non-ambulance transfers and I exonerate the Hospital and its staff from any deterioration in my condition that results from this refusal.    X___________________________________________    DATE  24  TIME________  Signature of patient or legally responsible individual signing on patient behalf           RELATIONSHIP TO PATIENT_________________________          Provider Certification    NAME Marisol Otoole                                        [facility] 1940                              MRN [id number]    A medical screening exam was performed on the above named patient.  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 Diagnoses of Fall, initial encounter and Current use of long term anticoagulation were also pertinent to this visit.    Patient Condition: The patient has been stabilized such that within reasonable medical probability, no material deterioration of the patient condition or the condition of the unborn child(tuyet) is likely to result from the transfer    Reason for Transfer: Level of Care needed not available at this facility    Transfer Requirements: Facility B   Space available and qualified personnel available for treatment as acknowledged by    Agreed to accept transfer and to provide appropriate medical treatment as acknowledged by       Dr Higgins  Appropriate medical records of the examination and treatment of the patient are provided at the time of transfer   STAFF INITIAL WHEN COMPLETED _______  Transfer will  be performed by qualified personnel from    and appropriate transfer equipment as required, including the use of necessary and appropriate life support measures.    Provider Certification: I have examined the patient and explained the following risks and benefits of being transferred/refusing transfer to the patient/family:  General risk, such as traffic hazards, adverse weather conditions, rough terrain or turbulence, possible failure of equipment (including vehicle or aircraft), or consequences of actions of persons outside the control of the transport personnel, Unanticipated needs of medical equipment and personnel during transport, Risk of worsening condition, The possibility of a transport vehicle being unavailable      Based on these reasonable risks and benefits to the patient and/or the unborn child(tuyet), and based upon the information available at the time of the patient’s examination, I certify that the medical benefits reasonably to be expected from the provision of appropriate medical treatments at another medical facility outweigh the increasing risks, if any, to the individual’s medical condition, and in the case of labor to the unborn child, from effecting the transfer.    X____________________________________________ DATE 08/08/24        TIME_______      ORIGINAL - SEND TO MEDICAL RECORDS   COPY - SEND WITH PATIENT DURING TRANSFER

## 2024-08-08 NOTE — PROGRESS NOTES
Patient:  MAURISIO ELENA    MRN:  [id number]    [place] Request ID:  [id number]    Level of care reserved:  Home Health Agency    Partner Reserved:  [facility], [place] [address] [phone]    Clinical needs requested:    Geography searched:  [id number]    Start of Service:  8/9/2024    Request sent:  8:13am EDT on 8/7/2024 by Linda Sifuentes    Partner reserved:  8:35am EDT on 8/7/2024 by Linda Sifuentes    Choice list shared:  8:35am EDT on 8/7/2024 by Linda Sifuentes

## 2024-08-08 NOTE — TELEPHONE ENCOUNTER
Will add patient to schedule if patient shows for nurse visit. If patient does not come in we will call tomorrow to schedule.

## 2024-08-08 NOTE — PROGRESS NOTES
Patient came in today for a nurse visit for 30 day Biotel as per Lexi MCCARTHY.    Biotel successfully placed on pt.    Biotel Instructions given to pt, pt verbally understood.

## 2024-08-08 NOTE — PROGRESS NOTES
Patient:  MAURISIO ELENA    MRN:  [id number]    [place] Request ID:  [id number]    Level of care reserved:    Partner Reserved:    Clinical needs requested:    Geography searched:  [place] [id number]    Start of Service:    Request sent:  8:34am EDT on 8/7/2024 by Linda Sifuentes    Partner reserved:    Choice list shared:

## 2024-08-08 NOTE — ASSESSMENT & PLAN NOTE
Present on admission, evidenced by creatinine of 1.60  Likely secondary to dehydration, poor oral intake.  Recent admission for CHF exacerbation.  Noted to be hypotensive on admission as well.  Received 2 L IVF Boluses + 12 hours of IVF  Creatinine has improved to baseline, 0.84

## 2024-08-08 NOTE — ASSESSMENT & PLAN NOTE
Patient presented to the ED after sustaining a fall at home when her legs gave out as she attempted to sit on the toilet.  With complaints of bilateral shoulder pain on admission  Recently discharged 5 days ago after being treated for CHF  Trauma imaging negative for acute fracture or displacement  Noted to have severe osteoarthritis bilateral shoulders, osteopenia in the right foot.  PT/OT Evaluation  Recommending STR, however, patient adamant about not going to STR.  Reports she has a  at home.

## 2024-08-08 NOTE — ED PROVIDER NOTES
History  Chief Complaint   Patient presents with    Fall     Fall at doc office, dizzy then fell backward and struck head, +thinners        Fall      Prior to Admission Medications   Prescriptions Last Dose Informant Patient Reported? Taking?   Blood Glucose Monitoring Suppl (OneTouch Verio Reflect) w/Device KIT  Self No No   Sig: Check blood sugars once daily. Please substitute with appropriate alternative as covered by patient's insurance. Dx: E11.65   Calcium Carb-Cholecalciferol (CALCIUM 600 + D PO)  Self Yes No   Sig: Take 1 tablet by mouth 2 (two) times a day   Cranberry 250 MG TABS  Self Yes No   Sig: Take by mouth   Lancets (onetouch ultrasoft) lancets  Self No No   Sig: Use in the morning Use as instructed   Patient not taking: Reported on 7/29/2024   Omega-3 Fatty Acids (Fish Oil) 300 MG CAPS  Self Yes No   Sig: Take by mouth   Patient not taking: Reported on 7/29/2024   OneTouch Delica Lancets 33G MISC  Self No No   Sig: Check blood sugars once daily. Please substitute with appropriate alternative as covered by patient's insurance. Dx: E11.65   Patient not taking: Reported on 7/29/2024   Turmeric (QC Tumeric Complex) 500 MG CAPS  Self Yes No   Sig: Take by mouth   Patient not taking: Reported on 7/29/2024   acetaminophen (TYLENOL) 500 mg tablet  Self Yes No   Sig: Take 500 mg by mouth every 6 (six) hours as needed   aspirin (ECOTRIN LOW STRENGTH) 81 mg EC tablet  Self No No   Sig: Take 1 tablet (81 mg total) by mouth daily   b complex vitamins capsule  Self Yes No   Sig: Take 1 capsule by mouth 2 (two) times a day     ferrous sulfate 324 (65 Fe) mg  Self No No   Sig: Take 1 tablet every other day.   furosemide (LASIX) 20 mg tablet   No No   Sig: Take 1 tablet (20 mg total) by mouth daily for 7 days   glucose blood (OneTouch Verio) test strip  Self No No   Sig: Check blood sugars once daily. Please substitute with appropriate alternative as covered by patient's insurance. Dx: E11.65   Patient not taking:  Reported on 2024   glucose blood test strip  Self No No   Sig: Use 1 each in the morning Use as instructed   Patient not taking: Reported on 2024   hydrocortisone 2.5 % cream  Self No No   Sig: Apply topically 2 (two) times a day   levothyroxine 50 mcg tablet  Self No No   Sig: TAKE 1 TABLET BY MOUTH EVERY DAY   ofloxacin (OCUFLOX) 0.3 % ophthalmic solution  Self No No   Si drops twice daily to left ear x 10 days.   Patient not taking: Reported on 2024   omeprazole (PriLOSEC) 40 MG capsule  Self No No   Sig: TAKE 1 CAPSULE BY MOUTH TWICE A DAY   oxyCODONE (Roxicodone) 5 immediate release tablet  Self No No   Sig: Take 0.5 tablets (2.5 mg total) by mouth 2 (two) times a day as needed for moderate pain Max Daily Amount: 5 mg   Patient not taking: Reported on 2024   oxybutynin (DITROPAN-XL) 5 mg 24 hr tablet  Self No No   Sig: Take 1 tablet (5 mg total) by mouth daily   oxygen gas  Self Yes No   Sig: Inhale 2 L/min continuous. Indications: copd   pregabalin (LYRICA) 25 mg capsule   No No   Sig: Take 1 capsule (25 mg total) by mouth daily at bedtime for 3 days, THEN 1 capsule (25 mg total) 2 (two) times a day for 27 days.   sertraline (ZOLOFT) 100 mg tablet  Self No No   Sig: TAKE 1 TABLET BY MOUTH EVERY DAY   simvastatin (ZOCOR) 40 mg tablet  Self No No   Sig: TAKE 1 TABLET BY MOUTH EVERYDAY AT BEDTIME   triamcinolone (KENALOG) 0.1 % cream  Self No No   Sig: Apply topically 2 (two) times a day   Patient not taking: Reported on 2024   warfarin (Coumadin) 2.5 mg tablet   No No   Sig: Take 1 tablet (2.5mg) Mon-Sat. Take 2 tablets (5mg) on Sun or as directed Do not start before 2024.      Facility-Administered Medications: None       Past Medical History:   Diagnosis Date    Anemia 2018    Anxiety     Arthritis     AVB (atrioventricular block)     first degree    Cataract     CHF (congestive heart failure) (HCC)     COPD, mild (HCC)     Coronary artery disease     Dislocation of  right shoulder joint     Frequent UTI     GERD (gastroesophageal reflux disease)     H/O: pneumonia     Heme positive stool     Hyperlipidemia     Hypertension     Hypothyroidism     Morbid obesity with BMI of 50.0-59.9, adult (HCC)     Obesity, morbid (HCC) 08/22/2018    JANICE on CPAP     Pulmonary hypertension (HCC) 08/22/2018    Severe aortic stenosis     Simple goiter     Skin cyst     within the armpits, right    Wears glasses        Past Surgical History:   Procedure Laterality Date    BREAST BIOPSY      CARDIAC CATHETERIZATION      CARPAL TUNNEL RELEASE Bilateral     CHOLECYSTECTOMY      DILATION AND CURETTAGE OF UTERUS      HYSTEROSCOPY      MASTOID SURGERY      WA COLONOSCOPY FLX DX W/COLLJ SPEC WHEN PFRMD N/A 9/6/2018    Procedure: COLONOSCOPY;  Surgeon: Shree Sosa III, MD;  Location: [facility];  Service: Gastroenterology    WA ECHO TRANSESOPHAG R-T 2D W/PRB IMG ACQUISJ I&R N/A 10/9/2018    Procedure: INTRA-OP TRANSESOPHAGEAL ECHOCARDIOGRAM (GARRISON);  Surgeon: Kushal Camarena DO;  Location: [facility];  Service: Cardiac Surgery    WA ESOPHAGOGASTRODUODENOSCOPY TRANSORAL DIAGNOSTIC N/A 8/31/2018    Procedure: ESOPHAGOGASTRODUODENOSCOPY (EGD);  Surgeon: Shree Sosa III, MD;  Location: [facility];  Service: Gastroenterology    WA REPLACE AORTIC VALVE OPENFEMORAL ARTERY APPROACH N/A 10/9/2018    Procedure: REPLACEMENT AORTIC VALVE TRANSCATHETER (TAVR) TRANSFEMORAL W/ 23 MM MENDOZA NOE S3 VALVE (ACCESS OF LEFT);  Surgeon: Kushal Camarena DO;  Location: [facility];  Service: Cardiac Surgery    TOTAL HIP ARTHROPLASTY Left 2007    TOTAL KNEE ARTHROPLASTY Bilateral        Family History   Problem Relation Age of Onset    Diabetes Mother     Stroke Mother     Cancer Father     Lung cancer Father     Diabetes Sister     Heart disease Sister     Hypertension Sister     Coronary artery disease Family     Diabetes Family     Hypertension Family     Cancer Family     Stroke Family     Thyroid disease Neg Hx       I have reviewed and agree with the history as documented.    E-Cigarette/Vaping    E-Cigarette Use Never User      E-Cigarette/Vaping Substances    Nicotine No     THC No     CBD No     Flavoring No     Other No     Unknown No      Social History     Tobacco Use    Smoking status: Never    Smokeless tobacco: Never   Vaping Use    Vaping status: Never Used   Substance Use Topics    Alcohol use: Not Currently    Drug use: Never       Review of Systems    Physical Exam  Physical Exam  Vitals and nursing note reviewed.   Constitutional:       General: She is not in acute distress.     Appearance: She is well-developed. She is ill-appearing (chronically ill).   HENT:      Head: Normocephalic. Contusion present. No raccoon eyes, Weiss's sign or laceration.     Eyes:      Conjunctiva/sclera: Conjunctivae normal.      Pupils: Pupils are equal, round, and reactive to light.   Neck:      Trachea: No tracheal deviation.   Cardiovascular:      Rate and Rhythm: Regular rhythm. Bradycardia present.      Pulses:           Radial pulses are 2+ on the left side.      Heart sounds: Normal heart sounds.   Pulmonary:      Effort: Pulmonary effort is normal. No respiratory distress.      Breath sounds: Wheezing present.   Abdominal:      General: There is no distension.      Palpations: Abdomen is soft.      Tenderness: There is no abdominal tenderness.   Musculoskeletal:      Cervical back: Normal range of motion.      Right lower le+ Pitting Edema present.      Left lower le+ Pitting Edema present.   Skin:     General: Skin is warm and dry.   Neurological:      Mental Status: She is alert.      GCS: GCS eye subscore is 4. GCS verbal subscore is 5. GCS motor subscore is 6.   Psychiatric:         Mood and Affect: Mood and affect normal.         Behavior: Behavior normal.         Vital Signs  ED Triage Vitals   Temperature Pulse Respirations Blood Pressure SpO2   24 1649 24 1649 24 1649 24 1649  08/08/24 1649   (!) 97.4 °F (36.3 °C) 56 18 116/56 99 %      Temp src Heart Rate Source Patient Position - Orthostatic VS BP Location FiO2 (%)   -- 08/08/24 1749 08/08/24 1749 08/08/24 1757 --    Monitor Lying Left arm       Pain Score       08/08/24 1649       No Pain           Vitals:    08/08/24 2036 08/08/24 2106 08/08/24 2145 08/08/24 2200   BP: 127/55 134/63 146/60 147/59   Pulse: (!) 42 (!) 42 (!) 42 (!) 43   Patient Position - Orthostatic VS:             Visual Acuity  Visual Acuity      Flowsheet Row Most Recent Value   L Pupil Size (mm) 3   R Pupil Size (mm) 3            ED Medications  Medications   acetaminophen (TYLENOL) tablet 650 mg (has no administration in time range)   albuterol inhalation solution 2.5 mg (2.5 mg Nebulization Given 8/8/24 1721)       Diagnostic Studies  Results Reviewed       Procedure Component Value Units Date/Time    HS Troponin I 4hr [[id number]]  (Normal) Collected: 08/08/24 2156    Lab Status: Final result Specimen: Blood from Arm, Right Updated: 08/08/24 2222     hs TnI 4hr 39 ng/L      Delta 4hr hsTnI 1 ng/L     HS Troponin I 2hr [[id number]]  (Normal) Collected: 08/08/24 2003    Lab Status: Final result Specimen: Blood from Arm, Right Updated: 08/08/24 2032     hs TnI 2hr 37 ng/L      Delta 2hr hsTnI -1 ng/L     HS Troponin 0hr (reflex protocol) [[id number]]  (Normal) Collected: 08/08/24 1705    Lab Status: Final result Specimen: Blood from Arm, Right Updated: 08/08/24 1735     hs TnI 0hr 38 ng/L     Protime-INR [[id number]]  (Abnormal) Collected: 08/08/24 1705    Lab Status: Final result Specimen: Blood from Arm, Right Updated: 08/08/24 1728     Protime 35.6 seconds      INR 3.49    Narrative:      INR Therapeutic Range    Indication                                             INR Range      Atrial Fibrillation                                               2.0-3.0  Hypercoagulable State                                    2.0.2.3  Left Ventricular Asist Device                             2.0-3.0  Mechanical Heart Valve                                  -    Aortic(with afib, MI, embolism, HF, LA enlargement,    and/or coagulopathy)                                     2.0-3.0 (2.5-3.5)     Mitral                                                             2.5-3.5  Prosthetic/Bioprosthetic Heart Valve               2.0-3.0  Venous thromboembolism (VTE: VT, PE        2.0-3.0    Basic metabolic panel [[id number]]  (Abnormal) Collected: 08/08/24 1705    Lab Status: Final result Specimen: Blood from Arm, Right Updated: 08/08/24 1727     Sodium 141 mmol/L      Potassium 4.2 mmol/L      Chloride 106 mmol/L      CO2 27 mmol/L      ANION GAP 8 mmol/L      BUN 32 mg/dL      Creatinine 0.79 mg/dL      Glucose 132 mg/dL      Calcium 9.4 mg/dL      eGFR 68 ml/min/1.73sq m     Narrative:      National Kidney Disease Foundation guidelines for Chronic Kidney Disease (CKD):     Stage 1 with normal or high GFR (GFR > 90 mL/min/1.73 square meters)    Stage 2 Mild CKD (GFR = 60-89 mL/min/1.73 square meters)    Stage 3A Moderate CKD (GFR = 45-59 mL/min/1.73 square meters)    Stage 3B Moderate CKD (GFR = 30-44 mL/min/1.73 square meters)    Stage 4 Severe CKD (GFR = 15-29 mL/min/1.73 square meters)    Stage 5 End Stage CKD (GFR <15 mL/min/1.73 square meters)  Note: GFR calculation is accurate only with a steady state creatinine    Magnesium [[id number]]  (Normal) Collected: 08/08/24 1705    Lab Status: Final result Specimen: Blood from Arm, Right Updated: 08/08/24 1727     Magnesium 1.9 mg/dL     CBC and differential [[id number]]  (Abnormal) Collected: 08/08/24 1705    Lab Status: Final result Specimen: Blood from Arm, Right Updated: 08/08/24 1711     WBC 11.37 Thousand/uL      RBC 3.79 Million/uL      Hemoglobin 10.2 g/dL      Hematocrit 34.0 %      MCV 90 fL      MCH 26.9 pg      MCHC 30.0 g/dL      RDW 15.0 %      MPV 9.7 fL      Platelets 393 Thousands/uL      nRBC 0 /100 WBCs      Segmented % 74 %      Immature  Grans % 1 %      Lymphocytes % 13 %      Monocytes % 7 %      Eosinophils Relative 5 %      Basophils Relative 0 %      Absolute Neutrophils 8.41 Thousands/µL      Absolute Immature Grans 0.07 Thousand/uL      Absolute Lymphocytes 1.50 Thousands/µL      Absolute Monocytes 0.76 Thousand/µL      Eosinophils Absolute 0.58 Thousand/µL      Basophils Absolute 0.05 Thousands/µL                    XR chest portable   Final Result by Dominguez Flores MD (08/08 1942)      Limited study. No acute abnormality is appreciated            Workstation performed: [id number]         CT head without contrast   Final Result by Zechariah Chavez MD (08/08 1840)      No acute intracranial abnormality.                  Workstation performed: [id number]                    Procedures  ECG 12 Lead Documentation Only    Date/Time: 8/8/2024 5:43 PM    Performed by: Mecca Boyce MD  Authorized by: Mecca Boyce MD    Indications / Diagnosis:  Fall  ECG reviewed by me, the ED Provider: yes    Patient location:  ED  Previous ECG:     Previous ECG:  Compared to current    Comparison ECG info:  08/06/24    Similarity:  Changes noted  Interpretation:     Interpretation: abnormal    Rate:     ECG rate:  53    ECG rate assessment: bradycardic    Rhythm:     Rhythm: sinus bradycardia    Ectopy:     Ectopy: PAC    QRS:     QRS axis:  Normal    QRS intervals:  Normal  Conduction:     Conduction: normal    ST segments:     ST segments:  Normal  T waves:     T waves: normal    Comments:      Some junctional escape beats  CriticalCare Time    Date/Time: 8/8/2024 10:59 PM    Performed by: Mecca Boyce MD  Authorized by: Mecca Boyce MD    Critical care provider statement:     Critical care time (minutes):  47    Critical care time was exclusive of:  Separately billable procedures and treating other patients and teaching time    Critical care was necessary to treat or prevent  imminent or life-threatening deterioration of the following conditions:  Cardiac failure and circulatory failure    Critical care was time spent personally by me on the following activities:  Obtaining history from patient or surrogate, development of treatment plan with patient or surrogate, discussions with consultants, evaluation of patient's response to treatment, examination of patient, review of old charts, re-evaluation of patient's condition, ordering and review of radiographic studies, ordering and review of laboratory studies and ordering and performing treatments and interventions    I assumed direction of critical care for this patient from another provider in my specialty: no             ED Course                                 SBIRT 20yo+      Flowsheet Row Most Recent Value   Initial Alcohol Screen: US AUDIT-C     1. How often do you have a drink containing alcohol? 0 Filed at: 08/08/2024 1701   2. How many drinks containing alcohol do you have on a typical day you are drinking?  0 Filed at: 08/08/2024 1701   3a. Male UNDER 65: How often do you have five or more drinks on one occasion? 0 Filed at: 08/08/2024 1701   3b. FEMALE Any Age, or MALE 65+: How often do you have 4 or more drinks on one occassion? 0 Filed at: 08/08/2024 1701   Audit-C Score 0 Filed at: 08/08/2024 1701   SHAWN: How many times in the past year have you...    Used an illegal drug or used a prescription medication for non-medical reasons? Never Filed at: 08/08/2024 1701                      Medical Decision Making  This is is an 83-year-old female who presents here today for evaluation of fall and bradycardia.  She was discharged from the hospital today after recurrent admission for a fall.  She was noticed to have episodes of bradycardia with history of atrial fibrillation but was asymptomatic.  She was to be discharged with 30-day BioTel monitor, and was sent to the cardiology clinic to have this placed.  She was walking out of  "the office and said she just fell, without any preceding symptoms.  She denies feeling lightheaded, weak, any palpitations.  According to the note from the cardiology NP she \"went down\" without any symptoms and was found to be in junctional rhythm, so was sent to the ER for evaluation.  The patient says she was recommended to go to short-term rehab but declined as she says she does have help at home.  She says she does have the cell phone to put near her monitor to try and transmit what rhythm happened during the event.  She thinks she hit the back of her head but denies any headache, any other pain or injuries from the fall.  She has no other complaints currently.    ROS: Otherwise negative, unless stated as above.    She is chronically ill-appearing, no acute distress.  She has mild diffuse wheezing but no respiratory distress.  She does have mild tenderness to the back of her head, but no other areas of tenderness or signs of trauma on exam.  Exam is otherwise unremarkable.  We will get CT scan of her head given Coumadin use.  We will get a chest x-ray given wheezing though she is denying any shortness of breath.  She had no direct chest trauma or pain.  We we will get lab work to evaluate for contributing factors to her fall, including ERIN, electrolyte abnormality, anemia.  We will have her try to transmit her information to [facility], to see what happened at the time of the event, to help with further disposition planning.    [facility] report shows sinus bradycardia with junctional escape beats and junctional rhythm.  I sent a copy of the office EKG as well as the first page of the [facility] report to the on-call cardiologist, Dr. Esteves recommended the patient go to [facility] for pacemaker placement.  I updated the patient on findings and plan of care, and she expresses understanding.    The patient primarily maintained heart rate in the 40s, with periodic brief dips down to the mid to high 30s, but no symptoms.  Later " in the evening, she had 2 dips down to 26 and 25, both again lasting a couple of seconds before spontaneously resolving, and having no associated symptoms.  I did speak with some [place] who will upgrade her to step down 2.    Problems Addressed:  Current use of long term anticoagulation: chronic illness or injury  Fall, initial encounter: acute illness or injury  Junctional bradycardia: acute illness or injury that poses a threat to life or bodily functions    Amount and/or Complexity of Data Reviewed  External Data Reviewed: labs, ECG and notes.  Labs: ordered. Decision-making details documented in ED Course.  Radiology: ordered and independent interpretation performed. Decision-making details documented in ED Course.  ECG/medicine tests: ordered and independent interpretation performed. Decision-making details documented in ED Course.    Risk  OTC drugs.  Prescription drug management.  Decision regarding hospitalization.                 Disposition  Final diagnoses:   Junctional bradycardia   Fall, initial encounter   Current use of long term anticoagulation     Time reflects when diagnosis was documented in both MDM as applicable and the Disposition within this note       Time User Action Codes Description Comment    8/8/2024  5:30 PM Mecca Boyce [R00.1] Junctional bradycardia     8/8/2024  5:30 PM Mecca Boyce Add [W19.XXXA] Fall, initial encounter     8/8/2024  5:30 PM Mecca Boyce Add [Z79.01] Current use of long term anticoagulation           ED Disposition       ED Disposition   Transfer to Another Facility-In Network    Condition   --    Date/Time   Thu Aug 8, 2024 8190    Comment   Marisol Otoole should be transferred out to [facility].               MD Documentation      Flowsheet Row Most Recent Value   Patient Condition The patient has been stabilized such that within reasonable medical probability, no material deterioration of the patient condition or the  condition of the unborn child(tuyet) is likely to result from the transfer   Reason for Transfer Level of Care needed not available at this facility   Benefits of Transfer Specialized equipment and/or services available at the receiving facility (Include comment)________________________  [pacemaker placement]   Risks of Transfer Potential for delay in receiving treatment, Potential deterioration of medical condition, Loss of IV, Increased discomfort during transfer, Possible worsening of condition or death during transfer   Accepting Physician Dr Higgins   Accepting Facility Name, City & State  [place]   Sending MD BARBIE Boyce MD   Provider Certification General risk, such as traffic hazards, adverse weather conditions, rough terrain or turbulence, possible failure of equipment (including vehicle or aircraft), or consequences of actions of persons outside the control of the transport personnel, Unanticipated needs of medical equipment and personnel during transport, Risk of worsening condition, The possibility of a transport vehicle being unavailable          RN Documentation      Flowsheet Row Most Recent Value   Accepting Facility Name, [facility]          Follow-up Information    None         Patient's Medications   Discharge Prescriptions    No medications on file       No discharge procedures on file.    PDMP Review         Value Time User    PDMP Reviewed  Yes 7/16/2024 11:36 AM MABEL Oliveira            ED Provider  Electronically Signed by                Mecca Boyce MD  08/08/24 0268       Mecca Boyce MD  08/08/24 4158

## 2024-08-08 NOTE — DISCHARGE SUMMARY
[facility]  Discharge- Marisol Otoole 1940, 83 y.o. female MRN: [id number]  Unit/Bed#: 2 E 259-01 Encounter: [id number]  Primary Care Provider: MABEL Hallman   Date and time admitted to hospital: 8/6/2024  8:17 AM    * Fall  Assessment & Plan  Patient presented to the ED after sustaining a fall at home when her legs gave out as she attempted to sit on the toilet.  With complaints of bilateral shoulder pain on admission  Recently discharged 5 days ago after being treated for CHF  Trauma imaging negative for acute fracture or displacement  Noted to have severe osteoarthritis bilateral shoulders, osteopenia in the right foot.  PT/OT Evaluation  Recommending STR, however, patient adamant about not going to STR.  Reports she has a  at home.    Paroxysmal A-fib (HCC)  Assessment & Plan  Known history  Unable to tolerate rate/rhythm controlling agents due to underlying bradycardia  Currently bradycardic, 40-50s.  Continue to monitor on telemetry  Typically takes coumadin - 2.5 mg daily Monday-Saturday and 5 mg on Sunday.  Supratherapeutic INR on admission, 5.90  Check INR today; goal INR 2-3    Leucocytosis  Assessment & Plan  Persistent mild elevation noted since end of July  Procal negative.  Leukocytosis improving.    ERIN (acute kidney injury) (HCC)  Assessment & Plan  Present on admission, evidenced by creatinine of 1.60  Likely secondary to dehydration, poor oral intake.  Recent admission for CHF exacerbation.  Noted to be hypotensive on admission as well.  Received 2 L IVF Boluses + 12 hours of IVF  Creatinine has improved to baseline, 0.84    Chronic hypoxemic respiratory failure (HCC)  Assessment & Plan  Chronic  Currently at baseline    Depression with anxiety  Assessment & Plan  Mood stable  Continue with Zoloft    S/P TAVR (transcatheter aortic valve replacement)  Assessment & Plan  Noted history    Gastroesophageal reflux disease without esophagitis  Assessment  & Plan  Continue PPI    Hyperlipidemia  Assessment & Plan  Continue statin    Acquired hypothyroidism  Assessment & Plan  TSH 1.252  Continue Levothyroxine 50 mcg daily    Hypomagnesemia-resolved as of 8/8/2024  Assessment & Plan  Resolved      Medical Problems       Resolved Problems  Date Reviewed: 8/8/2024            Resolved    Hypomagnesemia 8/8/2024     Resolved by  MABEL Perez        Discharging Physician / Practitioner: MABEL Perez  PCP: MABEL Hallman  Admission Date:   Admission Orders (From admission, onward)       Ordered        08/07/24 1346  INPATIENT ADMISSION  Once            08/06/24 1301  Place in Observation  Once                          Discharge Date: 08/08/24    Consultations During Hospital Stay:  IP CONSULT TO CARDIOLOGY    Procedures Performed:   None    Significant Findings / Test Results:   XR foot 3+ views RIGHT   Final Result by Christopher Cohen MD (08/06 1255)      No acute osseous abnormality.         Computerized Assisted Algorithm (CAA) may have been used to analyze all applicable images.         Workstation performed: [id number]         XR chest 1 view portable   Final Result by Milagro Mcdaniels MD (08/06 1205)      Skeleton normal for age.  No acute displaced fractures.      Low lung volumes producing vascular crowding. Question mild pulmonary venous congestion.            Workstation performed: [id number]         XR shoulder 2+ views RIGHT   Final Result by Christopher Cohen MD (08/06 1256)      No acute osseous abnormality.         Computerized Assisted Algorithm (CAA) may have been used to analyze all applicable images.         Workstation performed: [id number]         XR shoulder 2+ views LEFT   Final Result by Christopher Cohen MD (08/06 1257)      No acute osseous abnormality.         Computerized Assisted Algorithm (CAA) may have been used to analyze all applicable images.         Workstation performed: [id number]         CT head without contrast   Final  Result by Cedric Tavares MD (08/06 0859)      No acute intracranial abnormality.                  Workstation performed: [id number]         CT spine cervical without contrast   Final Result by Cedric Tavares MD (08/06 0902)      No cervical spine fracture or traumatic malalignment.                  Workstation performed: [id number]             Incidental Findings:   None     Test Results Pending at Discharge (will require follow up):   None     Outpatient Tests Requested:  Follow up with PCP within 1 wk  Follow up with Cardiology    Complications:  None    Reason for Admission: Fall    Hospital Course:   Marisol Otoole is a 83 y.o. female patient with past medical history of CHF, COPD, CAD, Hypothyroidism, HLD, HTN, Pulm Htn who originally presented to the hospital on 8/6/2024 due to mechanical fall at home when her legs gave out under her while she was in the bathroom.  Patient had been discharged from the hospital on 8/1, at which time she was treated for CHF exacerbation and recommended for STR, but she refused and went home instead.     She was seen by PT/OT this admission as well, at which time was recommended for STR.  She is again refusing to go.  She also was noted to have a bruise on her right foot that patient reports she dropped a lamp on her foot at home, x-ray did not indicate any fractures, only osteopenia.  Noted to also have osteoarthritis to bilateral shoulders.    She was noted to have bradycardia, which is chronic for her but was also slightly hypotensive on admission, cardiology did evaluate the patient, her lasix was held and monitored on telemetry, and was noted to be stable.  Cardiology recommending patient to continue with PO Lasix 20 mg daily x 1 wk and then take only as needed which was discussed with patient.  She was also noted to have a supratherapeutic INR and her coumadin was held.  She follows with the anticoagulation clinic OP, a message was sent to the clinic regarding  follow up.    Vital signs stable, labs stable.  Clear for discharge.      Please see above list of diagnoses and related plan for additional information.     Condition at Discharge: good    Discharge Day Visit / Exam:   Subjective:  Patient resting in recliner chair, discussed the STR recommendation with her again, she is still refusing and states they don't do the therapy at the faculties.    Vitals: Blood Pressure: 138/55 (08/08/24 0742)  Pulse: (!) 40 (08/08/24 0742)  Temperature: 97.9 °F (36.6 °C) (08/08/24 0742)  Temp Source: Axillary (08/08/24 0742)  Respirations: 18 (08/08/24 0742)  Weight - Scale: 85 kg (187 lb 6.3 oz) (08/08/24 0600)  SpO2: 96 % (08/08/24 0742)    Exam:   Physical Exam  Vitals and nursing note reviewed.   Constitutional:       General: She is not in acute distress.     Appearance: She is obese. She is ill-appearing.   Cardiovascular:      Rate and Rhythm: Regular rhythm. Bradycardia present.      Pulses: Normal pulses.      Heart sounds: Normal heart sounds.   Pulmonary:      Effort: Pulmonary effort is normal.      Breath sounds: Normal breath sounds.   Abdominal:      General: Bowel sounds are normal. There is no distension.      Palpations: Abdomen is soft.      Tenderness: There is no abdominal tenderness.   Musculoskeletal:         General: Swelling (right foot) present. Normal range of motion.      Right lower leg: No edema.      Left lower leg: No edema.   Skin:     General: Skin is warm and dry.      Coloration: Skin is pale.      Findings: Bruising (right foot) present.   Neurological:      Mental Status: She is alert and oriented to person, place, and time.   Psychiatric:         Mood and Affect: Mood normal.          Discussion with Family:  Patient denied calling anyone at this time.     Discharge instructions/Information to patient and family:   See after visit summary for information provided to patient and family.      Provisions for Follow-Up Care:  See after visit summary  for information related to follow-up care and any pertinent home health orders.      Mobility at time of Discharge:   Basic Mobility Inpatient Raw Score: 15  JH-HLM Goal: 4: Move to chair/commode  JH-HLM Achieved: 6: Walk 10 steps or more  HLM Goal achieved. Continue to encourage appropriate mobility.     Disposition:   Home with VNA Services (Reminder: Complete face to face encounter)    Planned Readmission: None     Discharge Statement:  I spent 45 minutes discharging the patient. This time was spent on the day of discharge. I had direct contact with the patient on the day of discharge. Greater than 50% of the total time was spent examining patient, answering all patient questions, arranging and discussing plan of care with patient as well as directly providing post-discharge instructions.  Additional time then spent on discharge activities.    Discharge Medications:  See after visit summary for reconciled discharge medications provided to patient and/or family.      **Please Note: This note may have been constructed using a voice recognition system**

## 2024-08-08 NOTE — TELEPHONE ENCOUNTER
----- Message from Lexi Durham PA-C sent at 8/8/2024 10:25 AM EDT -----  This patient is being discharged today and has an event monitor ordered. We advised her to stop by [place] office on her drive home today to have event monitor placed. Thank you!

## 2024-08-08 NOTE — CASE MANAGEMENT
Case Management Progress Note    Patient name Marisol Otoole  Location 2 EAST 259/2 E 259-01 MRN [id number]  : 1940 Date 2024       LOS (days): 1  Geometric Mean LOS (GMLOS) (days): 3.1  Days to GMLOS:2.1        OBJECTIVE:     Current admission status: Inpatient  Preferred Pharmacy:   [facility] [address] [address]  Phone: [phone] Fax: [phone]    Primary Care Provider: MABEL Hallman  Primary Insurance: MEDICARE  Secondary Insurance:     PROGRESS NOTE:  Patient discharged home today.  Residential C reserved for CHARMAINE.  Agency updated on d/c today.  Will contact patient directly to schedule [facility] visit. STR referral cancelled in [facility].

## 2024-08-08 NOTE — PLAN OF CARE
Problem: PAIN - ADULT  Goal: Verbalizes/displays adequate comfort level or baseline comfort level  Description: Interventions:  - Encourage patient to monitor pain and request assistance  - Assess pain using appropriate pain scale  - Administer analgesics based on type and severity of pain and evaluate response  - Implement non-pharmacological measures as appropriate and evaluate response  - Consider cultural and social influences on pain and pain management  - Notify physician/advanced practitioner if interventions unsuccessful or patient reports new pain  Outcome: Progressing     Problem: INFECTION - ADULT  Goal: Absence or prevention of progression during hospitalization  Description: INTERVENTIONS:  - Assess and monitor for signs and symptoms of infection  - Monitor lab/diagnostic results  - Monitor all insertion sites, i.e. indwelling lines, tubes, and drains  - Monitor endotracheal if appropriate and nasal secretions for changes in amount and color  - Crossnore appropriate cooling/warming therapies per order  - Administer medications as ordered  - Instruct and encourage patient and family to use good hand hygiene technique  - Identify and instruct in appropriate isolation precautions for identified infection/condition  Outcome: Progressing  Goal: Absence of fever/infection during neutropenic period  Description: INTERVENTIONS:  - Monitor WBC    Outcome: Progressing     Problem: SAFETY ADULT  Goal: Patient will remain free of falls  Description: INTERVENTIONS:  - Educate patient/family on patient safety including physical limitations  - Instruct patient to call for assistance with activity   - Consult OT/PT to assist with strengthening/mobility   - Keep Call bell within reach  - Keep bed low and locked with side rails adjusted as appropriate  - Keep care items and personal belongings within reach  - Initiate and maintain comfort rounds  - Make Fall Risk Sign visible to staff  - Offer Toileting every  Hours,  in advance of need  - Initiate/Maintain alarm  - Obtain necessary fall risk management equipment:   - Apply yellow socks and bracelet for high fall risk patients  - Consider moving patient to room near nurses station  Outcome: Progressing  Goal: Maintain or return to baseline ADL function  Description: INTERVENTIONS:  -  Assess patient's ability to carry out ADLs; assess patient's baseline for ADL function and identify physical deficits which impact ability to perform ADLs (bathing, care of mouth/teeth, toileting, grooming, dressing, etc.)  - Assess/evaluate cause of self-care deficits   - Assess range of motion  - Assess patient's mobility; develop plan if impaired  - Assess patient's need for assistive devices and provide as appropriate  - Encourage maximum independence but intervene and supervise when necessary  - Involve family in performance of ADLs  - Assess for home care needs following discharge   - Consider OT consult to assist with ADL evaluation and planning for discharge  - Provide patient education as appropriate  Outcome: Progressing  Goal: Maintains/Returns to pre admission functional level  Description: INTERVENTIONS:  - Perform AM-PAC 6 Click Basic Mobility/ Daily Activity assessment daily.  - Set and communicate daily mobility goal to care team and patient/family/caregiver.   - Collaborate with rehabilitation services on mobility goals if consulted  - Perform Range of Motion  times a day.  - Reposition patient every  hours.  - Dangle patient  times a day  - Stand patient  times a day  - Ambulate patient  times a day  - Out of bed to chair  times a day   - Out of bed for meals times a day  - Out of bed for toileting  - Record patient progress and toleration of activity level   Outcome: Progressing     Problem: DISCHARGE PLANNING  Goal: Discharge to home or other facility with appropriate resources  Description: INTERVENTIONS:  - Identify barriers to discharge w/patient and caregiver  - Arrange for  needed discharge resources and transportation as appropriate  - Identify discharge learning needs (meds, wound care, etc.)  - Arrange for interpretive services to assist at discharge as needed  - Refer to Case Management Department for coordinating discharge planning if the patient needs post-hospital services based on physician/advanced practitioner order or complex needs related to functional status, cognitive ability, or social support system  Outcome: Progressing     Problem: Knowledge Deficit  Goal: Patient/family/caregiver demonstrates understanding of disease process, treatment plan, medications, and discharge instructions  Description: Complete learning assessment and assess knowledge base.  Interventions:  - Provide teaching at level of understanding  - Provide teaching via preferred learning methods  Outcome: Progressing     Problem: Prexisting or High Potential for Compromised Skin Integrity  Goal: Skin integrity is maintained or improved  Description: INTERVENTIONS:  - Identify patients at risk for skin breakdown  - Assess and monitor skin integrity  - Assess and monitor nutrition and hydration status  - Monitor labs   - Assess for incontinence   - Turn and reposition patient  - Assist with mobility/ambulation  - Relieve pressure over bony prominences  - Avoid friction and shearing  - Provide appropriate hygiene as needed including keeping skin clean and dry  - Evaluate need for skin moisturizer/barrier cream  - Collaborate with interdisciplinary team   - Patient/family teaching  - Consider wound care consult   Outcome: Progressing

## 2024-08-08 NOTE — PROGRESS NOTES
Cardiology Progress Note - Marisol Otoole 83 y.o. female MRN: [id number]    Unit/Bed#: 2 E 259-01 Encounter: [id number]      Assessment/Plan:  1.  Hypotension  Patient asymptomatic  Last /47  Restart low-dose diuretics    2.  Bradycardia  Sinus high 30s to 50s  Asymptomatic  TSH 1.25  Has previously not tolerated beta-blockers  Plan for outpatient event monitor  Echo done EF 65%    3.  Mechanical fall  Lower extremity weakness  PT/OT following    4.  History of atrial fibrillation  Currently in sinus/sinus bradycardia  All heart rate slowing medications on hold  Continue Coumadin, follows closely in Coumadin clinic    5.  Supratherapeutic INR  INR Today 3.72      6.  Chronic HFpEF  7.  CAD  8.  Hyperlipidemia  9.  History of AS status post TAVR    Patient stable from cardiac standpoint for discharge.  Plan to follow-up in the office    Subjective:   Patient seen and examined.  No significant events overnight. Im feeling better, denies cp    Objective:     Vitals: Blood pressure (!) 119/47, pulse (!) 38, temperature 97.9 °F (36.6 °C), resp. rate 18, weight 85 kg (187 lb 6.3 oz), SpO2 96%, not currently breastfeeding., Body mass index is 45.18 kg/m².,   Orthostatic Blood Pressures      Flowsheet Row Most Recent Value   Blood Pressure 119/47 filed at 08/08/2024 1113   Patient Position - Orthostatic VS Sitting filed at 08/08/2024 0742              Intake/Output Summary (Last 24 hours) at 8/8/2024 1619  Last data filed at 8/7/2024 1632  Gross per 24 hour   Intake 943.75 ml   Output --   Net 943.75 ml         Physical Exam:  GEN: Alert and oriented x 3, in no acute distress.  Well appearing and well nourished.   HEENT: Sclera anicteric, conjunctivae pink, mucous membranes moist. Oropharynx clear.   NECK: Supple, no carotid bruits, no significant JVD. Trachea midline, no thyromegaly.   HEART: Regular bradycardia, normal S1 and S2, no murmurs, clicks, gallops or rubs. PMI nondisplaced, no thrills.   LUNGS: Clear to  auscultation bilaterally; no wheezes, rales, or rhonchi. No increased work of breathing or signs of respiratory distress.   ABDOMEN: Soft, nontender, nondistended, normoactive bowel sounds.   EXTREMITIES: Skin warm and well perfused, no clubbing, cyanosis, or edema.  NEURO: No focal findings. Normal speech. Mood and affect normal.   SKIN: Normal without suspicious lesions on exposed skin.      Medications:    No current facility-administered medications for this encounter.    Current Outpatient Medications:     [START ON 8/9/2024] furosemide (LASIX) 20 mg tablet, Take 1 tablet (20 mg total) by mouth daily for 7 days, Disp: 7 tablet, Rfl: 0    [START ON 8/9/2024] warfarin (Coumadin) 2.5 mg tablet, Take 1 tablet (2.5mg) Mon-Sat. Take 2 tablets (5mg) on Sun or as directed Do not start before August 9, 2024., Disp: 102 tablet, Rfl: 0    acetaminophen (TYLENOL) 500 mg tablet, Take 500 mg by mouth every 6 (six) hours as needed, Disp: , Rfl:     aspirin (ECOTRIN LOW STRENGTH) 81 mg EC tablet, Take 1 tablet (81 mg total) by mouth daily, Disp: 100 tablet, Rfl: 0    b complex vitamins capsule, Take 1 capsule by mouth 2 (two) times a day  , Disp: , Rfl:     Blood Glucose Monitoring Suppl (OneTouch Verio Reflect) w/Device KIT, Check blood sugars once daily. Please substitute with appropriate alternative as covered by patient's insurance. Dx: E11.65, Disp: 1 kit, Rfl: 0    Calcium Carb-Cholecalciferol (CALCIUM 600 + D PO), Take 1 tablet by mouth 2 (two) times a day, Disp: , Rfl:     Cranberry 250 MG TABS, Take by mouth, Disp: , Rfl:     ferrous sulfate 324 (65 Fe) mg, Take 1 tablet every other day., Disp: 90 tablet, Rfl: 1    glucose blood (OneTouch Verio) test strip, Check blood sugars once daily. Please substitute with appropriate alternative as covered by patient's insurance. Dx: E11.65 (Patient not taking: Reported on 7/29/2024), Disp: 100 each, Rfl: 3    glucose blood test strip, Use 1 each in the morning Use as instructed  (Patient not taking: Reported on 7/29/2024), Disp: 100 each, Rfl: 4    hydrocortisone 2.5 % cream, Apply topically 2 (two) times a day, Disp: 28 g, Rfl: 1    Lancets (onetouch ultrasoft) lancets, Use in the morning Use as instructed (Patient not taking: Reported on 7/29/2024), Disp: 100 each, Rfl: 1    levothyroxine 50 mcg tablet, TAKE 1 TABLET BY MOUTH EVERY DAY, Disp: 100 tablet, Rfl: 1    ofloxacin (OCUFLOX) 0.3 % ophthalmic solution, 5 drops twice daily to left ear x 10 days. (Patient not taking: Reported on 7/16/2024), Disp: 10 mL, Rfl: 1    Omega-3 Fatty Acids (Fish Oil) 300 MG CAPS, Take by mouth (Patient not taking: Reported on 7/29/2024), Disp: , Rfl:     omeprazole (PriLOSEC) 40 MG capsule, TAKE 1 CAPSULE BY MOUTH TWICE A DAY, Disp: 180 capsule, Rfl: 1    OneTouch Delica Lancets 33G MISC, Check blood sugars once daily. Please substitute with appropriate alternative as covered by patient's insurance. Dx: E11.65 (Patient not taking: Reported on 7/29/2024), Disp: 100 each, Rfl: 3    oxybutynin (DITROPAN-XL) 5 mg 24 hr tablet, Take 1 tablet (5 mg total) by mouth daily, Disp: 90 tablet, Rfl: 1    oxyCODONE (Roxicodone) 5 immediate release tablet, Take 0.5 tablets (2.5 mg total) by mouth 2 (two) times a day as needed for moderate pain Max Daily Amount: 5 mg (Patient not taking: Reported on 7/16/2024), Disp: 15 tablet, Rfl: 0    oxygen gas, Inhale 2 L/min continuous. Indications: copd, Disp: , Rfl:     pregabalin (LYRICA) 25 mg capsule, Take 1 capsule (25 mg total) by mouth daily at bedtime for 3 days, THEN 1 capsule (25 mg total) 2 (two) times a day for 27 days., Disp: 57 capsule, Rfl: 0    sertraline (ZOLOFT) 100 mg tablet, TAKE 1 TABLET BY MOUTH EVERY DAY, Disp: 90 tablet, Rfl: 1    simvastatin (ZOCOR) 40 mg tablet, TAKE 1 TABLET BY MOUTH EVERYDAY AT BEDTIME, Disp: 100 tablet, Rfl: 1    triamcinolone (KENALOG) 0.1 % cream, Apply topically 2 (two) times a day (Patient not taking: Reported on 7/29/2024), Disp:  15 g, Rfl: 1    Turmeric (QC Tumeric Complex) 500 MG CAPS, Take by mouth (Patient not taking: Reported on 7/29/2024), Disp: , Rfl:      Labs & Results:        Results from last 7 days   Lab Units 08/08/24  0529 08/07/24  0518 08/06/24  1428 08/06/24  0924   WBC Thousand/uL 10.94* 12.31*  --  13.13*   HEMOGLOBIN g/dL 9.1* 9.2*  --  10.3*   HEMATOCRIT % 29.6* 28.4*  --  32.4*   PLATELETS Thousands/uL 337 331 303 363         Results from last 7 days   Lab Units 08/08/24  0529 08/07/24  0518 08/06/24  1204   POTASSIUM mmol/L 4.4 4.2 4.3   CHLORIDE mmol/L 106 103 97   CO2 mmol/L 30 27 27   BUN mg/dL 36* 39* 55*   CREATININE mg/dL 0.84 0.88 1.40*   CALCIUM mg/dL 9.0 8.4 8.6     Results from last 7 days   Lab Units 08/08/24  1102 08/07/24  0914 08/06/24  1428   INR  3.72* 5.17* 5.90*     Results from last 7 days   Lab Units 08/08/24  0529 08/07/24  0518 08/06/24  0924   MAGNESIUM mg/dL 2.1 2.5 1.6*

## 2024-08-09 ENCOUNTER — HOSPITAL ENCOUNTER (INPATIENT)
Facility: HOSPITAL | Age: 84
LOS: 4 days | Discharge: HOME HEALTH | DRG: 243 | End: 2024-08-13
Attending: FAMILY MEDICINE | Admitting: INTERNAL MEDICINE
Payer: MEDICARE

## 2024-08-09 VITALS
HEART RATE: 49 BPM | TEMPERATURE: 97.4 F | DIASTOLIC BLOOD PRESSURE: 77 MMHG | SYSTOLIC BLOOD PRESSURE: 116 MMHG | OXYGEN SATURATION: 95 % | RESPIRATION RATE: 29 BRPM

## 2024-08-09 DIAGNOSIS — R00.1 JUNCTIONAL BRADYCARDIA: Primary | ICD-10-CM

## 2024-08-09 DIAGNOSIS — R00.1 BRADYCARDIA: ICD-10-CM

## 2024-08-09 LAB
ANION GAP SERPL CALCULATED.3IONS-SCNC: 7 MMOL/L (ref 4–13)
ATRIAL RATE: 40 BPM
BUN SERPL-MCNC: 31 MG/DL (ref 5–25)
CALCIUM SERPL-MCNC: 9.3 MG/DL (ref 8.4–10.2)
CHLORIDE SERPL-SCNC: 106 MMOL/L (ref 96–108)
CO2 SERPL-SCNC: 30 MMOL/L (ref 21–32)
CREAT SERPL-MCNC: 0.79 MG/DL (ref 0.6–1.3)
ERYTHROCYTE [DISTWIDTH] IN BLOOD BY AUTOMATED COUNT: 15 % (ref 11.6–15.1)
GFR SERPL CREATININE-BSD FRML MDRD: 68 ML/MIN/1.73SQ M
GLUCOSE SERPL-MCNC: 90 MG/DL (ref 65–140)
HCT VFR BLD AUTO: 28.6 % (ref 34.8–46.1)
HGB BLD-MCNC: 8.7 G/DL (ref 11.5–15.4)
INR PPP: 3.38 (ref 0.85–1.19)
MAGNESIUM SERPL-MCNC: 1.6 MG/DL (ref 1.9–2.7)
MCH RBC QN AUTO: 27.4 PG (ref 26.8–34.3)
MCHC RBC AUTO-ENTMCNC: 30.4 G/DL (ref 31.4–37.4)
MCV RBC AUTO: 90 FL (ref 82–98)
P AXIS: 55 DEGREES
PLATELET # BLD AUTO: 368 THOUSANDS/UL (ref 149–390)
PMV BLD AUTO: 10.1 FL (ref 8.9–12.7)
POTASSIUM SERPL-SCNC: 4 MMOL/L (ref 3.5–5.3)
PROTHROMBIN TIME: 33.8 SECONDS (ref 12.3–15)
QRS AXIS: -13 DEGREES
QRSD INTERVAL: 88 MS
QT INTERVAL: 448 MS
QTC INTERVAL: 420 MS
RBC # BLD AUTO: 3.17 MILLION/UL (ref 3.81–5.12)
SODIUM SERPL-SCNC: 143 MMOL/L (ref 135–147)
T WAVE AXIS: 51 DEGREES
VENTRICULAR RATE: 53 BPM
WBC # BLD AUTO: 10.91 THOUSAND/UL (ref 4.31–10.16)

## 2024-08-09 PROCEDURE — 99223 1ST HOSP IP/OBS HIGH 75: CPT | Performed by: STUDENT IN AN ORGANIZED HEALTH CARE EDUCATION/TRAINING PROGRAM

## 2024-08-09 PROCEDURE — 80048 BASIC METABOLIC PNL TOTAL CA: CPT | Performed by: INTERNAL MEDICINE

## 2024-08-09 PROCEDURE — 85027 COMPLETE CBC AUTOMATED: CPT | Performed by: INTERNAL MEDICINE

## 2024-08-09 PROCEDURE — 02H73JZ INSERTION OF PACEMAKER LEAD INTO LEFT ATRIUM, PERCUTANEOUS APPROACH: ICD-10-PCS | Performed by: INTERNAL MEDICINE

## 2024-08-09 PROCEDURE — 02HK3JZ INSERTION OF PACEMAKER LEAD INTO RIGHT VENTRICLE, PERCUTANEOUS APPROACH: ICD-10-PCS | Performed by: INTERNAL MEDICINE

## 2024-08-09 PROCEDURE — 99232 SBSQ HOSP IP/OBS MODERATE 35: CPT | Performed by: NURSE PRACTITIONER

## 2024-08-09 PROCEDURE — 99223 1ST HOSP IP/OBS HIGH 75: CPT | Performed by: INTERNAL MEDICINE

## 2024-08-09 PROCEDURE — 93010 ELECTROCARDIOGRAM REPORT: CPT | Performed by: INTERNAL MEDICINE

## 2024-08-09 PROCEDURE — 83735 ASSAY OF MAGNESIUM: CPT | Performed by: INTERNAL MEDICINE

## 2024-08-09 PROCEDURE — 0JH606Z INSERTION OF PACEMAKER, DUAL CHAMBER INTO CHEST SUBCUTANEOUS TISSUE AND FASCIA, OPEN APPROACH: ICD-10-PCS | Performed by: INTERNAL MEDICINE

## 2024-08-09 PROCEDURE — 85610 PROTHROMBIN TIME: CPT | Performed by: INTERNAL MEDICINE

## 2024-08-09 RX ORDER — OXYBUTYNIN CHLORIDE 5 MG/1
5 TABLET, EXTENDED RELEASE ORAL DAILY
Status: DISCONTINUED | OUTPATIENT
Start: 2024-08-09 | End: 2024-08-13 | Stop reason: HOSPADM

## 2024-08-09 RX ORDER — PREGABALIN 25 MG/1
25 CAPSULE ORAL 2 TIMES DAILY
Status: DISCONTINUED | OUTPATIENT
Start: 2024-08-09 | End: 2024-08-13 | Stop reason: HOSPADM

## 2024-08-09 RX ORDER — FUROSEMIDE 20 MG
20 TABLET ORAL DAILY
Status: DISCONTINUED | OUTPATIENT
Start: 2024-08-09 | End: 2024-08-13 | Stop reason: HOSPADM

## 2024-08-09 RX ORDER — CEFAZOLIN SODIUM 2 G/50ML
2000 SOLUTION INTRAVENOUS ONCE
Status: DISCONTINUED | OUTPATIENT
Start: 2024-08-09 | End: 2024-08-09 | Stop reason: HOSPADM

## 2024-08-09 RX ORDER — LANOLIN ALCOHOL/MO/W.PET/CERES
400 CREAM (GRAM) TOPICAL 2 TIMES DAILY
Status: DISCONTINUED | OUTPATIENT
Start: 2024-08-09 | End: 2024-08-13 | Stop reason: HOSPADM

## 2024-08-09 RX ORDER — ACETAMINOPHEN 325 MG/1
650 TABLET ORAL EVERY 6 HOURS PRN
Status: DISCONTINUED | OUTPATIENT
Start: 2024-08-09 | End: 2024-08-12 | Stop reason: SDUPTHER

## 2024-08-09 RX ORDER — PRAVASTATIN SODIUM 20 MG
80 TABLET ORAL
Status: DISCONTINUED | OUTPATIENT
Start: 2024-08-09 | End: 2024-08-13 | Stop reason: HOSPADM

## 2024-08-09 RX ORDER — ONDANSETRON 2 MG/ML
4 INJECTION INTRAMUSCULAR; INTRAVENOUS EVERY 6 HOURS PRN
Status: DISCONTINUED | OUTPATIENT
Start: 2024-08-09 | End: 2024-08-13 | Stop reason: HOSPADM

## 2024-08-09 RX ORDER — SERTRALINE HYDROCHLORIDE 100 MG/1
100 TABLET, FILM COATED ORAL DAILY
Status: DISCONTINUED | OUTPATIENT
Start: 2024-08-09 | End: 2024-08-13 | Stop reason: HOSPADM

## 2024-08-09 RX ORDER — LEVOTHYROXINE SODIUM 50 UG/1
50 TABLET ORAL
Status: DISCONTINUED | OUTPATIENT
Start: 2024-08-09 | End: 2024-08-13 | Stop reason: HOSPADM

## 2024-08-09 RX ORDER — PANTOPRAZOLE SODIUM 40 MG/1
40 TABLET, DELAYED RELEASE ORAL
Status: DISCONTINUED | OUTPATIENT
Start: 2024-08-09 | End: 2024-08-13 | Stop reason: HOSPADM

## 2024-08-09 RX ORDER — MAGNESIUM SULFATE HEPTAHYDRATE 40 MG/ML
2 INJECTION, SOLUTION INTRAVENOUS ONCE
Status: COMPLETED | OUTPATIENT
Start: 2024-08-09 | End: 2024-08-09

## 2024-08-09 RX ADMIN — PANTOPRAZOLE SODIUM 40 MG: 40 TABLET, DELAYED RELEASE ORAL at 17:41

## 2024-08-09 RX ADMIN — PREGABALIN 25 MG: 25 CAPSULE ORAL at 20:00

## 2024-08-09 RX ADMIN — B-COMPLEX W/ C & FOLIC ACID TAB 1 TABLET: TAB at 08:13

## 2024-08-09 RX ADMIN — PRAVASTATIN SODIUM 80 MG: 20 TABLET ORAL at 17:41

## 2024-08-09 RX ADMIN — B-COMPLEX W/ C & FOLIC ACID TAB 1 TABLET: TAB at 17:41

## 2024-08-09 RX ADMIN — MAGNESIUM OXIDE TAB 400 MG (241.3 MG ELEMENTAL MG) 400 MG: 400 (241.3 MG) TAB at 17:41

## 2024-08-09 RX ADMIN — MAGNESIUM SULFATE HEPTAHYDRATE 2 G: 40 INJECTION, SOLUTION INTRAVENOUS at 15:36

## 2024-08-09 RX ADMIN — PANTOPRAZOLE SODIUM 40 MG: 40 TABLET, DELAYED RELEASE ORAL at 05:39

## 2024-08-09 RX ADMIN — PREGABALIN 25 MG: 25 CAPSULE ORAL at 08:14

## 2024-08-09 RX ADMIN — FUROSEMIDE 20 MG: 20 TABLET ORAL at 08:14

## 2024-08-09 RX ADMIN — OXYBUTYNIN 5 MG: 5 TABLET, FILM COATED, EXTENDED RELEASE ORAL at 08:13

## 2024-08-09 RX ADMIN — LEVOTHYROXINE SODIUM 50 MCG: 50 TABLET ORAL at 05:39

## 2024-08-09 RX ADMIN — SERTRALINE HYDROCHLORIDE 100 MG: 100 TABLET ORAL at 08:14

## 2024-08-09 RX ADMIN — ASPIRIN 81 MG: 81 TABLET, COATED ORAL at 08:14

## 2024-08-09 NOTE — PROGRESS NOTES
[facility]  Progress Note  Name: Marisol Otoole I  MRN: [id number]  Unit/Bed#: -01 I Date of Admission: 8/9/2024   Date of Service: 8/9/2024 I Hospital Day: 0    Assessment & Plan   * Junctional bradycardia  Assessment & Plan  Patient was hospitalized at the [facility] 8/6/2024 - 8/8/2024 after a mechanical fall, no per traumatic injuries noted on evaluation.  Appears short-term rehab was recommended however patient declined this; noted with atrial fibrillation with slow VR not changed from prior. Presented again to that [facility] while picking up Biotel monitor from cardiology office, reportedly felt dizzy then fell backwards.  Trauma imaging negative for acute traumatic injuries, however Biotel monitor reportedly revealed sinus bradycardia with junctional escape beats and junctional rhythm, noted on ED telemetry HR 40s however intermittently with drops into the HR 30s and rarely HR mid 20s without associated symptoms  Tx to [facility] for EP evaluation for PPM placement. Plan for PPM on Monday given supratherapuetic INR   Avoid AV angela blocking agents  Monitor on telemetry  Continue with step down status     Paroxysmal A-fib (HCC)  Assessment & Plan  Reported with bradycardia at baseline, not able to tolerate rate controlling medications secondary to this  With symptomatic sinus bradycardia with junctional escape beats/junctional rhythm with plan for EP evaluation for PPM as per the primary problem  Continue telemetry monitoring  Anticoagulated on Coumadin, currently on hold for supratherapuetic INR     Chronic hypoxemic respiratory failure (HCC)  Assessment & Plan  Chronic, currently on baseline supplemental oxygen requirement  Continue supplemental oxygen, titrate if needed to maintain appropriate oxygenation    Depression with anxiety  Assessment & Plan  Mood stable, continue home dose Zoloft    Chronic heart failure with preserved ejection fraction (HFpEF)  (HCC)  Assessment & Plan  Wt Readings from Last 3 Encounters:   08/09/24 85 kg (187 lb 6.3 oz)   08/08/24 85 kg (187 lb 6.3 oz)   08/01/24 80.5 kg (177 lb 7.5 oz)   Previously hospitalized also at [facility] 7/29/2024 - 8/1/2024 for acute exacerbation of this treated with IV diuretics in conjunction with cardiology service  Currently appears euvolemic at this time, continue cardiac medications and monitor volume status closely  C/w lasix 20mg daily   Monitor daily weights     Obesity, morbid (HCC)  Assessment & Plan  BMI 45.18  Affecting all facets of life     Hyperlipidemia  Assessment & Plan  C/w statin     Acquired hypothyroidism  Assessment & Plan  TSH at prior hospitalization WNL, continue home dose levothyroxine 50mcg/day             VTE Pharmacologic Prophylaxis: VTE Score: 5 High Risk (Score >/= 5) - Pharmacological DVT Prophylaxis Contraindicated. Sequential Compression Devices Ordered.    Mobility:   Basic Mobility Inpatient Raw Score: 15  JH-HLM Goal: 4: Move to chair/commode  JH-HLM Achieved: 2: Bed activities/Dependent transfer  JH-HLM Goal NOT achieved. Continue with multidisciplinary rounding and encourage appropriate mobility to improve upon JH-HLM goals.    Patient Centered Rounds: I performed bedside rounds with nursing staff today.   Discussions with Specialists or Other Care Team Provider: d/w RNd/w EP     Education and Discussions with Family / Patient: Patient declined call to .     Total Time Spent on Date of Encounter in care of patient: 35 mins. This time was spent on one or more of the following: performing physical exam; counseling and coordination of care; obtaining or reviewing history; documenting in the medical record; reviewing/ordering tests, medications or procedures; communicating with other healthcare professionals and discussing with patient's family/caregivers.    Current Length of Stay: 0 day(s)  Current Patient Status: Inpatient   Certification Statement: The  patient will continue to require additional inpatient hospital stay due to ppm on Monday   Discharge Plan: Anticipate discharge in >72 hrs to discharge location to be determined pending rehab evaluations.    Code Status: Level 3 - DNAR and DNI    Subjective:   Pt sitting in bed, reports she is feeling upset about her current situation. Denies any chest pain or dizziness, no sob. Denies all other complaints     Objective:     Vitals:   Temp (24hrs), Av.1 °F (36.7 °C), Min:97.4 °F (36.3 °C), Max:98.6 °F (37 °C)    Temp:  [97.4 °F (36.3 °C)-98.6 °F (37 °C)] 98.3 °F (36.8 °C)  HR:  [39-71] 46  Resp:  [18-41] 19  BP: (108-152)/(49-77) 152/49  SpO2:  [83 %-100 %] 97 %  Body mass index is 45.18 kg/m².     Input and Output Summary (last 24 hours):     Intake/Output Summary (Last 24 hours) at 2024 1157  Last data filed at 2024 0900  Gross per 24 hour   Intake 0 ml   Output 350 ml   Net -350 ml       Physical Exam:   Physical Exam  Constitutional:       General: She is not in acute distress.     Appearance: She is morbidly obese.      Comments: Chronically ill appearing    Cardiovascular:      Rate and Rhythm: Regular rhythm. Bradycardia present.      Heart sounds: Normal heart sounds. No murmur heard.  Pulmonary:      Effort: Pulmonary effort is normal. No respiratory distress.      Breath sounds: Normal breath sounds. No wheezing or rales.   Abdominal:      General: Bowel sounds are normal. There is no distension.      Palpations: Abdomen is soft.      Tenderness: There is no abdominal tenderness.   Musculoskeletal:         General: Swelling present. No tenderness.      Right lower le+ Edema present.      Left lower le+ Edema present.   Skin:     General: Skin is warm and dry.      Findings: No erythema or rash.   Neurological:      Mental Status: She is alert and oriented to person, place, and time. Mental status is at baseline.   Psychiatric:         Mood and Affect: Mood is depressed.           Additional Data:     Labs:  Results from last 7 days   Lab Units 08/09/24  0542 08/08/24  1705   WBC Thousand/uL 10.91* 11.37*   HEMOGLOBIN g/dL 8.7* 10.2*   HEMATOCRIT % 28.6* 34.0*   PLATELETS Thousands/uL 368 393*   SEGS PCT %  --  74   LYMPHO PCT %  --  13*   MONO PCT %  --  7   EOS PCT %  --  5     Results from last 7 days   Lab Units 08/09/24  0542   SODIUM mmol/L 143   POTASSIUM mmol/L 4.0   CHLORIDE mmol/L 106   CO2 mmol/L 30   BUN mg/dL 31*   CREATININE mg/dL 0.79   ANION GAP mmol/L 7   CALCIUM mg/dL 9.3   GLUCOSE RANDOM mg/dL 90     Results from last 7 days   Lab Units 08/09/24  0542   INR  3.38*     Results from last 7 days   Lab Units 08/06/24  0822   POC GLUCOSE mg/dl 114         Results from last 7 days   Lab Units 08/08/24  0529 08/07/24  0518   PROCALCITONIN ng/ml 0.08 0.12       Lines/Drains:  Invasive Devices       Peripheral Intravenous Line  Duration             Peripheral IV 08/08/24 Right Antecubital <1 day              Drain  Duration             External Urinary Catheter 3 days                      Telemetry:  Telemetry Orders (From admission, onward)               24 Hour Telemetry Monitoring  Continuous x 24 Hours (Telem)        Question:  Reason for 24 Hour Telemetry  Answer:  Arrhythmias requiring acute medical intervention / PPM or ICD malfunction                     Telemetry Reviewed: Sinus Bradycardia, Pause(s), and junctional rhythm   Indication for Continued Telemetry Use: Awaiting PCI/EP Study/CABG             Imaging: Reviewed radiology reports from this admission including: CT head and xray(s)    Recent Cultures (last 7 days):   Results from last 7 days   Lab Units 08/06/24  1917   BLOOD CULTURE  No Growth at 48 hrs.       Last 24 Hours Medication List:   Current Facility-Administered Medications   Medication Dose Route Frequency Provider Last Rate    acetaminophen  650 mg Oral Q6H PRN Zeyad Paez DO      aspirin  81 mg Oral Daily Zeyad Paez DO      furosemide  20 mg  Oral Daily Zeyad Paez, DO      levothyroxine  50 mcg Oral Early Morning Zeyad Paez, DO      multivitamin stress formula  1 tablet Oral BID Zeyad Paez, DO      ondansetron  4 mg Intravenous Q6H PRN Zeyad Paez, DO      oxybutynin  5 mg Oral Daily Zeyad Paez, DO      pantoprazole  40 mg Oral BID AC Zeyad Paez, DO      pravastatin  80 mg Oral Daily With Dinner Zeyad Paez, DO      pregabalin  25 mg Oral BID Zeyad Paez, DO      sertraline  100 mg Oral Daily Zeyad Paez, DO          Today, Patient Was Seen By: MABEL Serrato    **Please Note: This note may have been constructed using a voice recognition system.**

## 2024-08-09 NOTE — H&P
[facility]  H&P  Name: Marisol Otoole 83 y.o. female I MRN: [id number]  Unit/Bed#: -01 I Date of Admission: 8/9/2024   Date of Service: 8/9/2024 I Hospital Day: 0      Assessment & Plan   * Junctional bradycardia  Assessment & Plan  Patient was hospitalized at the [facility] 8/6/2024 - 8/8/2024 after a mechanical fall, no per traumatic injuries noted on evaluation.  Appears short-term rehab was recommended however patient declined this; noted with atrial fibrillation with slow VR not changed from prior  Presented again to that [place]'s ED while picking up Biotel monitor from cardiology office, reportedly felt dizzy then fell backwards.  Trauma imaging negative for acute traumatic injuries, however Biotel monitor reportedly revealed sinus bradycardia with junctional escape beats and junctional rhythm, noted on ED telemetry HR 40s however intermittently with drops into the HR 30s and rarely HR mid 20s without associated symptoms  EM physician discussed with cardiology on-call who advised transfer to this [place] for EP evaluation for PPM placement.  Await consultation from their service, keep n.p.o. after midnight  Avoid AV angela blocking agents  Monitor on telemetry    Paroxysmal A-fib (HCC)  Assessment & Plan  Reported with bradycardia at baseline, not able to tolerate rate controlling medications secondary to this  With symptomatic sinus bradycardia with junctional escape beats/junctional rhythm with plan for EP evaluation for PPM as per the primary problem  Continue telemetry monitoring  Anticoagulated on Coumadin, hold tonight's dose and check INR in a.m.    Chronic hypoxemic respiratory failure (HCC)  Assessment & Plan  Chronic, currently on baseline supplemental oxygen requirement  Continue supplemental oxygen, titrate if needed to maintain appropriate oxygenation    Depression with anxiety  Assessment & Plan  Mood stable, continue home dose Zoloft    Chronic heart  failure with preserved ejection fraction (HFpEF) ([facility])  Assessment & Plan  Wt Readings from Last 3 Encounters:   08/08/24 85 kg (187 lb 6.3 oz)   08/01/24 80.5 kg (177 lb 7.5 oz)   06/18/24 83.5 kg (184 lb)   Previously hospitalized also at [facility] 7/29/2024 - 8/1/2024 for acute exacerbation of this treated with IV diuretics in conjunction with cardiology service  Currently appears euvolemic at this time, continue cardiac medications and monitor volume status closely            Hyperlipidemia  Assessment & Plan  Chronic and stable, continue home dose levothyroxine    Acquired hypothyroidism  Assessment & Plan  TSH at prior hospitalization WNL, continue home dose levothyroxine             VTE Prophylaxis: Pharmacologic VTE Prophylaxis contraindicated due to supratherapeutic INR   / sequential compression device   Code Status: Level 3 - DNAR and DNI   POLST: POLST form is not discussed and not completed at this time.  Discussion with family: Patient declines at this time    Anticipated Length of Stay:  Patient will be admitted on an Inpatient basis with an anticipated length of stay of greater than 2 midnights.   Justification for Hospital Stay: Please see detailed plans noted above.    Chief Complaint:     Fall, junctional bradycardia  History of Present Illness:  Marisol Otoole is a 83 y.o. female who has a past medical history significant for paroxysmal atrial fibrillation anticoagulated on Coumadin and not on rate controlling agents due to underlying bradycardia, chronic HFpEF with recent hospitalization at the [facility] 7/29/2024 - 8/1/2024 for exacerbation of this, chronic respiratory failure with hypoxia on 2 LNC usually nightly, hypothyroidism initially presenting to the [facility] ED after a recurrent fall.      She was just hospitalized at the [facility] 8/6/2024 - 8/8/2024 after a mechanical fall with inability to rise, with trauma imaging negative however patient with persistent weakness also  noted bradycardic.  She was seen conjunction by cardiology who arranged for outpatient event monitor and seen by PT/OT who recommended short-term rehab after evaluation however patient declined this and returned home.  Prior to returning home she went to her cardiologist office to  the Biotel monitor which she had placed however had episode where she reportedly felt dizzy and sustained a recurrent fall for which she was brought back to the [facility] ED for further evaluation.  The initial interrogation of the Biotel monitor revealed concerns for sinus bradycardia with junctional escape beats and junctional rhythm, additionally patient noted with heart rate drops during evaluation into the mid to high 30s occasionally into mid 20s however asymptomatic throughout these events.  EM physician at that [place] discussed with cardiology on-call who advised transfer to this [place] for EP evaluation for potential PPM placement.    Currently, patient is lying in bed in no acute distress and comfortable appearing.  She denies any chest pain/pressure, shortness of breath, dizziness/lightheadedness, or other systemic symptoms at this time; does admit to some mild anxiety regarding pacemaker placement.    Review of Systems:    Constitutional:  Denies fever or chills   Eyes:  Denies change in visual acuity   HENT:  Denies nasal congestion or sore throat   Respiratory:  Denies cough or shortness of breath   Cardiovascular:  Denies chest pain or edema   GI:  Denies abdominal pain, nausea, vomiting, bloody stools or diarrhea   :  Denies dysuria   Musculoskeletal:  Denies back pain or joint pain but reported gait dysfunction  Integument:  Denies rash   Neurologic:  Denies headache, focal weakness or sensory changes but reported dizziness  Endocrine:  Denies polyuria or polydipsia   Lymphatic:  Denies swollen glands   Psychiatric:  Denies depression or anxiety     Past Medical and Surgical History:   Past Medical History:    Diagnosis Date    Anemia 08/22/2018    Anxiety     Arthritis     AVB (atrioventricular block)     first degree    Cataract     CHF (congestive heart failure) ([facility])     COPD, mild (HCC)     Coronary artery disease     Dislocation of right shoulder joint     Frequent UTI     GERD (gastroesophageal reflux disease)     H/O: pneumonia     Heme positive stool     Hyperlipidemia     Hypertension     Hypothyroidism     Morbid obesity with BMI of 50.0-59.9, adult ([facility])     Obesity, morbid (HCC) 08/22/2018    JANICE on CPAP     Pulmonary hypertension ([facility]) 08/22/2018    Severe aortic stenosis     Simple goiter     Skin cyst     within the armpits, right    Wears glasses      Past Surgical History:   Procedure Laterality Date    BREAST BIOPSY      CARDIAC CATHETERIZATION      CARPAL TUNNEL RELEASE Bilateral     CHOLECYSTECTOMY      DILATION AND CURETTAGE OF UTERUS      HYSTEROSCOPY      MASTOID SURGERY      IA COLONOSCOPY FLX DX W/COLLJ SPEC WHEN PFRMD N/A 9/6/2018    Procedure: COLONOSCOPY;  Surgeon: Shree Sosa III, MD;  Location: [facility];  Service: Gastroenterology    IA ECHO TRANSESOPHAG R-T 2D W/PRB IMG ACQUISJ I&R N/A 10/9/2018    Procedure: INTRA-OP TRANSESOPHAGEAL ECHOCARDIOGRAM (GARRISON);  Surgeon: Kushal Camarena DO;  Location:  MAIN OR;  Service: Cardiac Surgery    IA ESOPHAGOGASTRODUODENOSCOPY TRANSORAL DIAGNOSTIC N/A 8/31/2018    Procedure: ESOPHAGOGASTRODUODENOSCOPY (EGD);  Surgeon: Shree Sosa III, MD;  Location: [facility];  Service: Gastroenterology    IA REPLACE AORTIC VALVE OPENFEMORAL ARTERY APPROACH N/A 10/9/2018    Procedure: REPLACEMENT AORTIC VALVE TRANSCATHETER (TAVR) TRANSFEMORAL W/ 23 MM MENDOZA NOE S3 VALVE (ACCESS OF LEFT);  Surgeon: Kushal Camarena DO;  Location: [facility];  Service: Cardiac Surgery    TOTAL HIP ARTHROPLASTY Left 2007    TOTAL KNEE ARTHROPLASTY Bilateral        Meds/Allergies:    Medications Prior to Admission:     acetaminophen (TYLENOL) 500 mg tablet    aspirin  (ECOTRIN LOW STRENGTH) 81 mg EC tablet    b complex vitamins capsule    Blood Glucose Monitoring Suppl (OneTouch Verio Reflect) w/Device KIT    Calcium Carb-Cholecalciferol (CALCIUM 600 + D PO)    Cranberry 250 MG TABS    ferrous sulfate 324 (65 Fe) mg    furosemide (LASIX) 20 mg tablet    glucose blood (OneTouch Verio) test strip    glucose blood test strip    hydrocortisone 2.5 % cream    Lancets (onetouch ultrasoft) lancets    levothyroxine 50 mcg tablet    ofloxacin (OCUFLOX) 0.3 % ophthalmic solution    Omega-3 Fatty Acids (Fish Oil) 300 MG CAPS    omeprazole (PriLOSEC) 40 MG capsule    OneTouch Delica Lancets 33G MISC    oxybutynin (DITROPAN-XL) 5 mg 24 hr tablet    oxyCODONE (Roxicodone) 5 immediate release tablet    oxygen gas    pregabalin (LYRICA) 25 mg capsule    sertraline (ZOLOFT) 100 mg tablet    simvastatin (ZOCOR) 40 mg tablet    triamcinolone (KENALOG) 0.1 % cream    Turmeric (QC Tumeric Complex) 500 MG CAPS    warfarin (Coumadin) 2.5 mg tablet    Allergies:   Allergies   Allergen Reactions    Latex Rash    Neosporin [Neomycin-Bacitracin Zn-Polymyx] Rash and Other (See Comments)     hives per PACC order     History:  Marital Status: Single     Substance Use History:   Social History     Substance and Sexual Activity   Alcohol Use Not Currently     Social History     Tobacco Use   Smoking Status Never   Smokeless Tobacco Never     Social History     Substance and Sexual Activity   Drug Use Never       Family History:  Family History   Problem Relation Age of Onset    Diabetes Mother     Stroke Mother     Cancer Father     Lung cancer Father     Diabetes Sister     Heart disease Sister     Hypertension Sister     Coronary artery disease Family     Diabetes Family     Hypertension Family     Cancer Family     Stroke Family     Thyroid disease Neg Hx        Physical Exam:     Vitals:   Blood Pressure: 128/56 (08/09/24 0324)  Pulse: (!) 48 (08/09/24 0324)  SpO2: 95 % (08/09/24 0324)    Constitutional:   Well developed, well nourished, no acute distress, non-toxic appearance   Eyes:  PERRL, conjunctiva normal   HENT:  Atraumatic, external ears normal, nose normal, oropharynx moist, no pharyngeal exudates. Neck- normal range of motion, no tenderness, supple   Respiratory:  No respiratory distress, normal breath sounds, no rales, no wheezing   Cardiovascular:  Bradycardic and irregularly irregular rate and rhythm, no murmurs, no gallops, no rubs   GI:  Soft, nondistended, normal bowel sounds, nontender, no organomegaly, no mass, no rebound, no guarding   :  No costovertebral angle tenderness   Musculoskeletal:  No edema, no tenderness, no deformities. Back- no tenderness  Integument:  Well hydrated, no rash   Lymphatic:  No lymphadenopathy noted   Neurologic:  Alert &awake, communicative, CN 2-12 normal, normal motor function, normal sensory function, no focal deficits noted   Psychiatric:  Speech and behavior appropriate       Lab Results: I have personally reviewed pertinent reports.      Results from last 7 days   Lab Units 08/08/24  1705   WBC Thousand/uL 11.37*   HEMOGLOBIN g/dL 10.2*   HEMATOCRIT % 34.0*   PLATELETS Thousands/uL 393*   SEGS PCT % 74   LYMPHO PCT % 13*   MONO PCT % 7   EOS PCT % 5     Results from last 7 days   Lab Units 08/08/24  1705   POTASSIUM mmol/L 4.2   CHLORIDE mmol/L 106   CO2 mmol/L 27   BUN mg/dL 32*   CREATININE mg/dL 0.79   CALCIUM mg/dL 9.4     Results from last 7 days   Lab Units 08/08/24  1705   INR  3.49*       EKG: Personally reviewed.  Marked sinus bradycardia with fusion complexes and PACs HR 53    Imaging: I have personally reviewed pertinent reports.   and I have personally reviewed pertinent films in PACS    XR chest portable    Result Date: 8/8/2024  Narrative: XR CHEST PORTABLE INDICATION: wheezing, fall, hx COPD. COMPARISON: August 6, 2024 FINDINGS: Study is limited. Artifacts project over the chest. No obvious infiltrates. No pneumothorax or pleural effusion. Grossly  stable cardiomediastinal silhouette. Electronic device projects over the heart. Probably outside the patient evidence of prior TAVR. Stable appearance of osseous structures. There appear to be some chronic changes at both shoulders. Laxity of the joints and degenerative arthritic changes are noted. There is degenerative disc disease throughout the visualized spine. Normal upper abdomen.     Impression: Limited study. No acute abnormality is appreciated Workstation performed: [id number]     CT head without contrast    Result Date: 8/8/2024  Narrative: CT BRAIN - WITHOUT CONTRAST INDICATION:   Headache and head trauma. COMPARISON: 8/6/2024. TECHNIQUE:  CT examination of the brain was performed.  Multiplanar 2D reformatted images were created from the source data. Radiation dose length product (DLP) for this visit:  786 mGy-cm .  This examination, like all CT scans performed in the [facility], was performed utilizing techniques to minimize radiation dose exposure, including the use of iterative reconstruction and automated exposure control. IMAGE QUALITY:  Diagnostic. FINDINGS: PARENCHYMA: Periventricular and subcortical hypoattenuating foci consistent with microangiopathic disease. No acute intracranial hemorrhage or mass effect. VENTRICLES AND EXTRA-AXIAL SPACES: No hydrocephalus or extra-axial collection. VISUALIZED ORBITS: Intact. PARANASAL SINUSES: Clear. CALVARIUM AND EXTRACRANIAL SOFT TISSUES: No lytic or blastic lesion or fracture.     Impression: No acute intracranial abnormality. Workstation performed: [id number]     XR shoulder 2+ views LEFT    Result Date: 8/6/2024  Narrative: XR SHOULDER 2+ VW LEFT INDICATION: FALL SHOULDER PAIN. COMPARISON: 8/11/2023 FINDINGS: No acute fracture or dislocation. Severe glenohumeral osteoarthritis. No lytic or blastic osseous lesion. Unremarkable soft tissues.     Impression: No acute osseous abnormality. Computerized Assisted Algorithm (CAA) may have been  used to analyze all applicable images. Workstation performed: [id number]     XR shoulder 2+ views RIGHT    Result Date: 8/6/2024  Narrative: XR SHOULDER 2+ VW RIGHT INDICATION: FALL SHOULDER PAIN. COMPARISON: None FINDINGS: No acute fracture or dislocation. Corticated osseous fragment adjacent to the greater tuberosity consistent with old trauma. Severe glenohumeral osteoarthritis. Postsurgical changes noted at the right glenoid No lytic or blastic osseous lesion. Unremarkable soft tissues.     Impression: No acute osseous abnormality. Computerized Assisted Algorithm (CAA) may have been used to analyze all applicable images. Workstation performed: [id number]     XR foot 3+ views RIGHT    Result Date: 8/6/2024  Narrative: XR FOOT 3+ VW RIGHT INDICATION: bruising, erythema and swelling. COMPARISON: None FINDINGS: No acute fracture or dislocation. Degenerative changes in the first metatarsophalangeal joint. Diffuse osteopenia. Unremarkable soft tissues.     Impression: No acute osseous abnormality. Computerized Assisted Algorithm (CAA) may have been used to analyze all applicable images. Workstation performed: [id number]     XR chest 1 view portable    Result Date: 8/6/2024  Narrative: XR CHEST PORTABLE INDICATION: fall. COMPARISON: CXR 7/29/2024, chest CT 4/6/2024, cervical spine CT from today. FINDINGS: Low lung volumes producing vascular crowding. Question mild pulmonary venous congestion. No pneumothorax or pleural effusion. Mild cardiomegaly. TAVR. No acute displaced fracture. Old right humeral head and neck fracture. Suture anchors right glenohumeral joint. Severe left glenohumeral degenerative disease. Normal upper abdomen.     Impression: Skeleton normal for age.  No acute displaced fractures. Low lung volumes producing vascular crowding. Question mild pulmonary venous congestion. Workstation performed: [id number]     CT spine cervical without contrast    Result Date: 8/6/2024  Narrative: CT CERVICAL SPINE -  WITHOUT CONTRAST INDICATION:   fall, head strike. COMPARISON: CT 4/6/2024. TECHNIQUE:  CT examination of the cervical spine was performed without intravenous contrast.  Contiguous axial images were obtained. Multiplanar 2D reformatted images were created from the source data. Radiation dose length product (DLP) for this visit:  431 mGy-cm .  This examination, like all CT scans performed in the  Network, was performed utilizing techniques to minimize radiation dose exposure, including the use of iterative reconstruction and automated exposure control. IMAGE QUALITY:  Diagnostic. FINDINGS: ALIGNMENT: Unchanged grade 1 anterolisthesis at C3-4 and grade 1 retrolisthesis at C5-6. No acute subluxation. VERTEBRAE: No acute fracture. Bony fusion at C4-5 again noted. DEGENERATIVE CHANGES:  Severe multilevel cervical degenerative changes are noted.  No critical central canal stenosis. PREVERTEBRAL AND PARASPINAL SOFT TISSUES: Unremarkable THORACIC INLET:  Normal.     Impression: No cervical spine fracture or traumatic malalignment. Workstation performed: [id number]     CT head without contrast    Result Date: 8/6/2024  Narrative: CT BRAIN - WITHOUT CONTRAST INDICATION:   fall head stike on coumadin. COMPARISON: CT head 4/6/2024. TECHNIQUE:  CT examination of the brain was performed.  Multiplanar 2D reformatted images were created from the source data. Radiation dose length product (DLP) for this visit:  871 mGy-cm .  This examination, like all CT scans performed in the  Network, was performed utilizing techniques to minimize radiation dose exposure, including the use of iterative reconstruction and automated exposure control. IMAGE QUALITY:  Diagnostic. FINDINGS: PARENCHYMA: Decreased attenuation is noted in periventricular and subcortical white matter demonstrating an appearance that is statistically most likely to represent mild microangiopathic change. No CT signs of acute infarction.   No intracranial mass, mass effect or midline shift.  No acute parenchymal hemorrhage. VENTRICLES AND EXTRA-AXIAL SPACES:  Normal for the patient's age. VISUALIZED ORBITS: Normal visualized orbits. PARANASAL SINUSES: Normal visualized paranasal sinuses. CALVARIUM AND EXTRACRANIAL SOFT TISSUES:  Normal.     Impression: No acute intracranial abnormality. Workstation performed: [id number]     Echo complete    Result Date: 7/30/2024  Narrative:   Left Ventricle: Left ventricular cavity size is normal. Wall thickness is normal. The left ventricular ejection fraction is 65%. Systolic function is normal. Wall motion is normal. Diastolic function is normal.   Right Ventricle: Right ventricular cavity size is dilated. Systolic function is normal.   Left Atrium: The atrium is mildly dilated.   Right Atrium: The atrium is mildly dilated.   Aortic Valve: There is a TAVR bioprosthetic valve.  Peak velocity was 2.72 m/s.  Peak and mean gradients across the valve were 29 and 14 mmHg respectively.  Aortic valve area by the continuity equation method was 1.87 cm². There is mild regurgitation.   Mitral Valve: There is moderate annular calcification. There is mild regurgitation. There is moderate stenosis.  Mitral valve area by the PHT method was 1 cm².  Mean pressure gradient across the valve was 4 mmHg.   Tricuspid Valve: There is moderate to severe regurgitation. The right ventricular systolic pressure is mildly to moderately elevated. The estimated right ventricular systolic pressure is 54.00 mmHg.   Pulmonic Valve: There is mild regurgitation.   IVC/SVC: The right atrial pressure is estimated at 8.0 mmHg. The inferior vena cava is dilated. Respirophasic changes were normal.     X-ray chest 2 views    Result Date: 7/29/2024  Narrative: XR CHEST PA & LATERAL INDICATION: chest pain. COMPARISON: Portable chest 4/6/2024. CT chest 4/6/2024. FINDINGS: Clear lungs. No pneumothorax or pleural effusion. Prosthetic cardiac valve noted.  Otherwise unremarkable cardiomediastinal silhouette. Old posttraumatic and postsurgical changes in the right proximal humerus and glenoid. Normal upper abdomen.     Impression: No acute cardiopulmonary disease. Workstation performed: [id number]         ** Please Note: Dragon 360 Dictation voice to text software was used in the creation of this document. **

## 2024-08-09 NOTE — CONSULTS
Consultation - Electrophysiology - Cardiology  Marisol Otoole 83 y.o. female MRN: [id number]  Unit/Bed#: -01 Encounter: [id number]      Inpatient consult to Electrophysiology  Consult performed by: Roxie Al PA-C  Consult ordered by: Zeyad Paez DO        History of Present Illness   Physician Requesting Consult: Zeyad Paez DO  Reason for Consult / Principal Problem: bradycardia    Assessment & Plan   Symptomatic bradycardia, pauses up to 3.8 seconds  -- heart rates 30-40s with sinus pauses on telemetry  -- patient experiences lightheadedness, dizziness, and has had recent falls  PAF on warfarin  -- elevated INR 3.8 today  -- event monitor February 2024 showed AF burden 11%  HFpEF 65%  Mild leukocytosis  Anemia, hemoglobin 8.7  Aortic stenosis s/p TAVR 10/9/2018  Morbid obesity  HLD  Hypothyroidism  Mechanical falls  Chronic respiratory failure secondary to JANICE/OHS/pHTN    Plan:  -- meets indications for implantation of a permanent pacemaker   -- procedure deferred today given elevated INR 3.8, leukocytosis, and anemia  -- allow INR to trend down and monitor for infection  -- plan for dual chamber PPM on Monday 8/12/2024 if above issues resolved  -- replace magnesium  -- NPO after midnight on Sunday  -- discussed with patient and primary team    HPI: Marisol Otoole is a 83 y.o. female with a history of PAF on warfarin, HFpEF, aortic stenosis s/p TAVR, CAD, mitral stenosis, morbid obesity, HTN, HLD, hypothyroidism, respiratory failure, JANICE on CPAP, pHTN, mechanical falls who was transferred from [facility] for evaluation of bradycardia.     She follows with Dr. Esteves as an outpatient.  She has a history of severe AS and underwent TAVR in October 2018.  She has chronic respiratory failure due to to multiple issues including JANICE, obesity hypoventilation syndrome, pulmonary hypertension.  Her LVEF remains preserved at 65%.    She was hospitalized at [facility] from 8/6/2024 to  8/8/2024 after mechanical fall.  No traumatic injuries were noted.  She had A-fib with slow ventricular response on telemetry.  She was discharged with recommendation to wear a Biotel monitor.  As her vital was being applied in the office, she became dizzy and fell backwards.  Biotel monitor documented sinus bradycardia with competing junctional rhythm.  In the ED, she was in sinus bradycardia with a heart rate in the 40s.  Given her bradycardia, dizziness, and falls, she was transferred to [facility] for evaluation of permanent pacemaker.    At [facility], telemetry has shown sinus bradycardia with a heart rate as low as 28 bpm, in the 30s and 40s pain and now sinus bradycardia with a heart rate in the 50s while she was awake.  She has had sinus pauses lasting up to 3.8 seconds.  Reports dizziness and lightheadedness.  She is n.p.o. for possible pacemaker implant today.    Her INR has been elevated and is 3.38.  It was previously 5.9.  She is a mild leukocytosis ranging from 10-12.  Her hemoglobin dropped from 10.2-8.7 today.      TELE: sinus bradycardia heart rates as low as 30s, currently sinus rianna HR 51 bpm. Sinus pauses lasting up to 3.8 seconds    EKGs reviewed: sinus bradycardia with 1st degree AV block HR 44 bpm,  ms, QRS 84 ms, Qtc 444 ms    Echo 7/30/2024:    Left Ventricle: Left ventricular cavity size is normal. Wall thickness is normal. The left ventricular ejection fraction is 65%. Systolic function is normal. Wall motion is normal. Diastolic function is normal.    Right Ventricle: Right ventricular cavity size is dilated. Systolic function is normal.    Left Atrium: The atrium is mildly dilated.    Right Atrium: The atrium is mildly dilated.    Aortic Valve: There is a TAVR bioprosthetic valve.  Peak velocity was 2.72 m/s.  Peak and mean gradients across the valve were 29 and 14 mmHg respectively.  Aortic valve area by the continuity equation method was 1.87 cm². There is  mild regurgitation.    Mitral Valve: There is moderate annular calcification. There is mild regurgitation. There is moderate stenosis.  Mitral valve area by the PHT method was 1 cm².  Mean pressure gradient across the valve was 4 mmHg.    Tricuspid Valve: There is moderate to severe regurgitation. The right ventricular systolic pressure is mildly to moderately elevated. The estimated right ventricular systolic pressure is 54.00 mmHg.    Pulmonic Valve: There is mild regurgitation.    IVC/SVC: The right atrial pressure is estimated at 8.0 mmHg. The inferior vena cava is dilated. Respirophasic changes were normal.    Event monitor 2/5/2024 - 2/19/2024:  Pauses >2 seconds occurred 5 time(s) with longest pause 3.2 seconds. This occurred early morning  52 events were transmitted. 1 patient triggered; 51 auto triggered  Patient monitored for 11d 15h 46m  AF occurred 328 time(s) with HR range of 48 - 127; Total AF Pablo 11%  Heart Block occurred 5 time(s) the most severe 1°; slowest 26 BPM  182,940 PACs with PAC burden of 19%  3,066 PVCs with PVC burden of <1%    Review of Systems   Constitutional: Negative.    HENT: Negative.     Eyes: Negative.    Respiratory: Negative.     Cardiovascular: Negative.    Gastrointestinal: Negative.    Endocrine: Negative.    Genitourinary: Negative.    Musculoskeletal: Negative.    Skin: Negative.    Neurological:  Positive for dizziness and light-headedness.   Hematological: Negative.    Psychiatric/Behavioral: Negative.         Historical Information   Past Medical History:   Diagnosis Date    Anemia 08/22/2018    Anxiety     Arthritis     AVB (atrioventricular block)     first degree    Cataract     CHF (congestive heart failure) (HCC)     COPD, mild (HCC)     Coronary artery disease     Dislocation of right shoulder joint     Frequent UTI     GERD (gastroesophageal reflux disease)     H/O: pneumonia     Heme positive stool     Hyperlipidemia     Hypertension     Hypothyroidism      Morbid obesity with BMI of 50.0-59.9, adult (HCC)     Obesity, morbid (HCC) 08/22/2018    JANICE on CPAP     Pulmonary hypertension (HCC) 08/22/2018    Severe aortic stenosis     Simple goiter     Skin cyst     within the armpits, right    Wears glasses        Past Surgical History:   Procedure Laterality Date    BREAST BIOPSY      CARDIAC CATHETERIZATION      CARPAL TUNNEL RELEASE Bilateral     CHOLECYSTECTOMY      DILATION AND CURETTAGE OF UTERUS      HYSTEROSCOPY      MASTOID SURGERY      SD COLONOSCOPY FLX DX W/COLLJ SPEC WHEN PFRMD N/A 9/6/2018    Procedure: COLONOSCOPY;  Surgeon: Shree Sosa III, MD;  Location: [facility];  Service: Gastroenterology    SD ECHO TRANSESOPHAG R-T 2D W/PRB IMG ACQUISJ I&R N/A 10/9/2018    Procedure: INTRA-OP TRANSESOPHAGEAL ECHOCARDIOGRAM (GARRISON);  Surgeon: Kushal Camarena DO;  Location: [facility];  Service: Cardiac Surgery    SD ESOPHAGOGASTRODUODENOSCOPY TRANSORAL DIAGNOSTIC N/A 8/31/2018    Procedure: ESOPHAGOGASTRODUODENOSCOPY (EGD);  Surgeon: Shree Sosa III, MD;  Location: [facility];  Service: Gastroenterology    SD REPLACE AORTIC VALVE OPENFEMORAL ARTERY APPROACH N/A 10/9/2018    Procedure: REPLACEMENT AORTIC VALVE TRANSCATHETER (TAVR) TRANSFEMORAL W/ 23 MM MENDOZA NOE S3 VALVE (ACCESS OF LEFT);  Surgeon: Kushal Camarena DO;  Location: [facility];  Service: Cardiac Surgery    TOTAL HIP ARTHROPLASTY Left 2007    TOTAL KNEE ARTHROPLASTY Bilateral        Social History     Substance and Sexual Activity   Alcohol Use Not Currently     Social History     Substance and Sexual Activity   Drug Use Never     Social History     Tobacco Use   Smoking Status Never    Passive exposure: Never   Smokeless Tobacco Never       Family History   Problem Relation Age of Onset    Diabetes Mother     Stroke Mother     Cancer Father     Lung cancer Father     Diabetes Sister     Heart disease Sister     Hypertension Sister     Coronary artery disease Family     Diabetes Family      Hypertension Family     Cancer Family     Stroke Family     Thyroid disease Neg Hx        Meds/Allergies   Hospital Medications:   Current Facility-Administered Medications   Medication Dose Route Frequency    acetaminophen (TYLENOL) tablet 650 mg  650 mg Oral Q6H PRN    aspirin (ECOTRIN LOW STRENGTH) EC tablet 81 mg  81 mg Oral Daily    furosemide (LASIX) tablet 20 mg  20 mg Oral Daily    levothyroxine tablet 50 mcg  50 mcg Oral Early Morning    multivitamin stress formula tablet 1 tablet  1 tablet Oral BID    ondansetron (ZOFRAN) injection 4 mg  4 mg Intravenous Q6H PRN    oxybutynin (DITROPAN-XL) 24 hr tablet 5 mg  5 mg Oral Daily    pantoprazole (PROTONIX) EC tablet 40 mg  40 mg Oral BID AC    pravastatin (PRAVACHOL) tablet 80 mg  80 mg Oral Daily With Dinner    pregabalin (LYRICA) capsule 25 mg  25 mg Oral BID    sertraline (ZOLOFT) tablet 100 mg  100 mg Oral Daily       Home Medications:   Medications Prior to Admission:     acetaminophen (TYLENOL) 500 mg tablet    aspirin (ECOTRIN LOW STRENGTH) 81 mg EC tablet    b complex vitamins capsule    Blood Glucose Monitoring Suppl (OneTouch Verio Reflect) w/Device KIT    Calcium Carb-Cholecalciferol (CALCIUM 600 + D PO)    Cranberry 250 MG TABS    ferrous sulfate 324 (65 Fe) mg    furosemide (LASIX) 20 mg tablet    glucose blood (OneTouch Verio) test strip    glucose blood test strip    hydrocortisone 2.5 % cream    Lancets (onetouch ultrasoft) lancets    levothyroxine 50 mcg tablet    ofloxacin (OCUFLOX) 0.3 % ophthalmic solution    Omega-3 Fatty Acids (Fish Oil) 300 MG CAPS    omeprazole (PriLOSEC) 40 MG capsule    OneTouch Delica Lancets 33G MISC    oxybutynin (DITROPAN-XL) 5 mg 24 hr tablet    oxyCODONE (Roxicodone) 5 immediate release tablet    oxygen gas    pregabalin (LYRICA) 25 mg capsule    sertraline (ZOLOFT) 100 mg tablet    simvastatin (ZOCOR) 40 mg tablet    triamcinolone (KENALOG) 0.1 % cream    Turmeric (QC Tumeric Complex) 500 MG CAPS    warfarin  "(Coumadin) 2.5 mg tablet      Allergies   Allergen Reactions    Latex Rash    Neosporin [Neomycin-Bacitracin Zn-Polymyx] Rash and Other (See Comments)     hives per PACC order         Objective   Vitals: Blood pressure (!) 152/49, pulse (!) 46, temperature 98.3 °F (36.8 °C), temperature source Oral, resp. rate 19, height 4' 6\" (1.372 m), weight 85 kg (187 lb 6.3 oz), SpO2 97%, not currently breastfeeding.  Orthostatic Blood Pressures      Flowsheet Row Most Recent Value   Blood Pressure 152/49 filed at 08/09/2024 0833   Patient Position - Orthostatic VS Lying filed at 08/09/2024 0833              Intake/Output Summary (Last 24 hours) at 8/9/2024 1142  Last data filed at 8/9/2024 0900  Gross per 24 hour   Intake 0 ml   Output 350 ml   Net -350 ml       Invasive Devices       Peripheral Intravenous Line  Duration             Peripheral IV 08/08/24 Right Antecubital <1 day              Drain  Duration             External Urinary Catheter 3 days                    Physical Exam   GEN: NAD, alert and oriented x 3, well appearing  SKIN: warm, dry without significant lesions or rashes  HEENT: NCAT  NECK: supple, no JVD appreciated  CARDIOVASCULAR: regular, bradycardic, normal S1, S2 without murmurs, rubs, or gallops   LUNGS: CTA bilaterally without wheezes, rhonchi, or rales  ABDOMEN: Soft, nontender, nondistended.   EXTREMITIES/VASCULAR: perfused without clubbing, cyanosis, or LE edema b/l  PSYCH: Normal mood and affect  NEURO: CN ll-Xll grossly intact    Lab Results: I have personally reviewed pertinent lab results.    Results from last 7 days   Lab Units 08/09/24  0542 08/08/24  1705 08/08/24  0529   WBC Thousand/uL 10.91* 11.37* 10.94*   HEMOGLOBIN g/dL 8.7* 10.2* 9.1*   HEMATOCRIT % 28.6* 34.0* 29.6*   PLATELETS Thousands/uL 368 393* 337     Results from last 7 days   Lab Units 08/09/24  0542 08/08/24  1705 08/08/24  0529   POTASSIUM mmol/L 4.0 4.2 4.4   CHLORIDE mmol/L 106 106 106   CO2 mmol/L 30 27 30   BUN mg/dL " 31* 32* 36*   CREATININE mg/dL 0.79 0.79 0.84   CALCIUM mg/dL 9.3 9.4 9.0     Results from last 7 days   Lab Units 08/09/24  0542 08/08/24  1705 08/08/24  1102   INR  3.38* 3.49* 3.72*     Results from last 7 days   Lab Units 08/09/24  0542 08/08/24  1705 08/08/24  0529   MAGNESIUM mg/dL 1.6* 1.9 2.1

## 2024-08-09 NOTE — WOUND OSTOMY CARE
Consult Note - Wound   Marisol Otoole 83 y.o. female MRN: [id number]  Unit/Bed#: -01 Encounter: [id number]        History and Present Illness:  Patient is an 82 yo female that was admitted to [facility] for treatment of junctional bradycardia. Patient has a PMH of anxiety, CHF, COPD, GERD, HLD, HTN. Patient is a mod assist for turning and repositioning. Patient is continent of bowel and bladder. On assessment, patient is seen lying on regular mattress.      Wound Care was consulted for pre hospital wound.     Assessment Findings:   B/L heels are dry intact and hua with no skin loss or wounds present. Recommend preventative Hydraguard Cream and proper offloading/ repositioning.  Right Anterior Foot is intact with scattered ecchymosis present with no skin loss or wounds present.       B/L Sacro-Buttocks is intact with no skin loss or wounds present. Left buttocks with area of intact red, dry, blanchable tissue. Recommend preventative hydraguard to areas.   Right Abdomen/Pannus ITD/MASD: irregular in shape area of partial thickness skin loss. Wound bed is pink/red in color. Maira-wound is fragile. Scant sanguinous drainage noted. Recommend calcium alginate rope to area.     No induration, fluctuance, odor, warmth/temperature differences, redness, or purulence noted to the above noted wounds and skin areas assessed. New dressings applied per orders listed below. Patient tolerated well- no s/s of non-verbal pain or discomfort observed during the encounter. Bedside nurse aware of plan of care. See flow sheets for more detailed assessment findings.      Orders listed below and wound care will sign off, call or Secure Chat Message with questions.     Skin Care Plan:  1-Cleanse right abdomen skin fold with soap and water. Apply calcium alginate rope to area. Change daily or PRN soilage/displacement.   2-Turn/reposition q2h or when medically stable for pressure re-distribution on skin .  3-Elevate heels to offload  pressure.  4-Moisturize skin daily with skin nourishing cream  5-Ehob cushion in chair when out of bed.  6-Preventative Hydraguard to bilateral sacro-buttocks and heels BID and PRN.       Wounds:  Wound 07/30/24 Pelvis Anterior;Right (Active)   Wound Image   08/09/24 1015   Wound Description Beefy red 08/09/24 1015   Maira-wound Assessment Fragile 08/09/24 1015   Wound Length (cm) 1 cm 08/09/24 1015   Wound Width (cm) 2 cm 08/09/24 1015   Wound Depth (cm) 0.1 cm 08/09/24 1015   Wound Surface Area (cm^2) 2 cm^2 08/09/24 1015   Wound Volume (cm^3) 0.2 cm^3 08/09/24 1015   Calculated Wound Volume (cm^3) 0.2 cm^3 08/09/24 1015   Change in Wound Size % 42.86 08/09/24 1015   Drainage Amount Scant 08/09/24 1015   Drainage Description Sanguineous 08/09/24 1015   Non-staged Wound Description Partial thickness 08/09/24 1015   Treatments Cleansed;Site care 08/09/24 1015   Dressing Calcium Alginate 08/09/24 1015   Dressing Changed New 08/09/24 1015   Patient Tolerance Tolerated well 08/09/24 1015   Dressing Status Intact;Clean;Dry 08/09/24 1015       Wound 08/06/24 Pressure Injury Sacrum (Active)   Wound Image   08/09/24 1011   Wound Description Pink;Intact 08/09/24 1015   Pressure Injury Stage O 08/09/24 1015   Maira-wound Assessment Intact 08/09/24 1015   Wound Length (cm) 0 cm 08/09/24 1015   Wound Width (cm) 0 cm 08/09/24 1015   Wound Depth (cm) 0 cm 08/09/24 1015   Wound Surface Area (cm^2) 0 cm^2 08/09/24 1015   Wound Volume (cm^3) 0 cm^3 08/09/24 1015   Calculated Wound Volume (cm^3) 0 cm^3 08/09/24 1015   Drainage Amount None 08/09/24 1015   Treatments Cleansed;Site care 08/09/24 1015   Dressing Moisture barrier 08/09/24 1015   Dressing Changed New 08/09/24 1015   Patient Tolerance Tolerated well 08/09/24 1015   Dressing Status Intact 08/09/24 1015       Wound 08/06/24 Traumatic Cellulitis Foot Anterior;Right (Active)   Wound Image   08/09/24 1014   Wound Description Intact 08/09/24 1015   Maira-wound Assessment Intact  08/09/24 1015   Wound Length (cm) 0 cm 08/09/24 1015   Wound Width (cm) 0 cm 08/09/24 1015   Wound Depth (cm) 0 cm 08/09/24 1015   Wound Surface Area (cm^2) 0 cm^2 08/09/24 1015   Wound Volume (cm^3) 0 cm^3 08/09/24 1015   Calculated Wound Volume (cm^3) 0 cm^3 08/09/24 1015   Dressing Status Other (Comment) 08/07/24 2025               Tamiko Epps RN, BSN, CWOCN

## 2024-08-09 NOTE — DISCHARGE INSTR - OTHER ORDERS
Skin Care Plan:  1-Cleanse right abdomen skin fold with soap and water. Apply calcium alginate rope to area. Change daily or PRN soilage/displacement.   2-Turn/reposition q2h or when medically stable for pressure re-distribution on skin .  3-Elevate heels to offload pressure.  4-Moisturize skin daily with skin nourishing cream  5-Ehob cushion in chair when out of bed.  6-Preventative Hydraguard to bilateral sacro-buttocks and heels BID and PRN.

## 2024-08-09 NOTE — Clinical Note
The PACER GENERATOR COURTNEY XT DR LUCY PERSON - [id number] device was inserted. The leads were placed into the connector and visually verified to be in correct position. Injury current obtained.

## 2024-08-09 NOTE — ASSESSMENT & PLAN NOTE
Chronic, currently on baseline supplemental oxygen requirement  Continue supplemental oxygen, titrate if needed to maintain appropriate oxygenation

## 2024-08-09 NOTE — ASSESSMENT & PLAN NOTE
Patient was hospitalized at the [facility] 8/6/2024 - 8/8/2024 after a mechanical fall, no per traumatic injuries noted on evaluation.  Appears short-term rehab was recommended however patient declined this; noted with atrial fibrillation with slow VR not changed from prior  Presented again to that campus's ED while picking up Biotel monitor from cardiology office, reportedly felt dizzy then fell backwards.  Trauma imaging negative for acute traumatic injuries, however Biotel monitor reportedly revealed sinus bradycardia with junctional escape beats and junctional rhythm, noted on ED telemetry HR 40s however intermittently with drops into the HR 30s and rarely HR mid 20s without associated symptoms  EM physician discussed with cardiology on-call who advised transfer to this campus for EP evaluation for PPM placement.  Await consultation from their service, keep n.p.o. after midnight  Avoid AV angela blocking agents  Monitor on telemetry

## 2024-08-09 NOTE — ASSESSMENT & PLAN NOTE
Reported with bradycardia at baseline, not able to tolerate rate controlling medications secondary to this  With symptomatic sinus bradycardia with junctional escape beats/junctional rhythm with plan for EP evaluation for PPM as per the primary problem  Continue telemetry monitoring  Anticoagulated on Coumadin, hold tonight's dose and check INR in a.m.

## 2024-08-09 NOTE — ASSESSMENT & PLAN NOTE
Wt Readings from Last 3 Encounters:   08/09/24 85 kg (187 lb 6.3 oz)   08/08/24 85 kg (187 lb 6.3 oz)   08/01/24 80.5 kg (177 lb 7.5 oz)   Previously hospitalized also at [facility] 7/29/2024 - 8/1/2024 for acute exacerbation of this treated with IV diuretics in conjunction with cardiology service  Currently appears euvolemic at this time, continue cardiac medications and monitor volume status closely  C/w lasix 20mg daily   Monitor daily weights

## 2024-08-09 NOTE — PLAN OF CARE
Problem: CARDIOVASCULAR - ADULT  Goal: Maintains optimal cardiac output and hemodynamic stability  Description: INTERVENTIONS:  - Monitor I/O, vital signs and rhythm  - Monitor for S/S and trends of decreased cardiac output  - Administer and titrate ordered vasoactive medications to optimize hemodynamic stability  - Assess quality of pulses, skin color and temperature  - Assess for signs of decreased coronary artery perfusion  - Instruct patient to report change in severity of symptoms  Outcome: Progressing  Goal: Absence of cardiac dysrhythmias or at baseline rhythm  Description: INTERVENTIONS:  - Continuous cardiac monitoring, vital signs, obtain 12 lead EKG if ordered  - Administer antiarrhythmic and heart rate control medications as ordered  - Monitor electrolytes and administer replacement therapy as ordered  Outcome: Progressing     Problem: RESPIRATORY - ADULT  Goal: Achieves optimal ventilation and oxygenation  Description: INTERVENTIONS:  - Assess for changes in respiratory status  - Assess for changes in mentation and behavior  - Position to facilitate oxygenation and minimize respiratory effort  - Oxygen administered by appropriate delivery if ordered  - Initiate smoking cessation education as indicated  - Encourage broncho-pulmonary hygiene including cough, deep breathe, Incentive Spirometry  - Assess the need for suctioning and aspirate as needed  - Assess and instruct to report SOB or any respiratory difficulty  - Respiratory Therapy support as indicated  Outcome: Progressing     Problem: DISCHARGE PLANNING  Goal: Discharge to home or other facility with appropriate resources  Description: INTERVENTIONS:  - Identify barriers to discharge w/patient and caregiver  - Arrange for needed discharge resources and transportation as appropriate  - Identify discharge learning needs (meds, wound care, etc.)  - Arrange for interpretive services to assist at discharge as needed  - Refer to Case Management  Department for coordinating discharge planning if the patient needs post-hospital services based on physician/advanced practitioner order or complex needs related to functional status, cognitive ability, or social support system  Outcome: Progressing     Problem: Knowledge Deficit  Goal: Patient/family/caregiver demonstrates understanding of disease process, treatment plan, medications, and discharge instructions  Description: Complete learning assessment and assess knowledge base.  Interventions:  - Provide teaching at level of understanding  - Provide teaching via preferred learning methods  Outcome: Progressing

## 2024-08-09 NOTE — ASSESSMENT & PLAN NOTE
Patient was hospitalized at the [facility] 8/6/2024 - 8/8/2024 after a mechanical fall, no per traumatic injuries noted on evaluation.  Appears short-term rehab was recommended however patient declined this; noted with atrial fibrillation with slow VR not changed from prior. Presented again to that campus's ED while picking up Biotel monitor from cardiology office, reportedly felt dizzy then fell backwards.  Trauma imaging negative for acute traumatic injuries, however Biotel monitor reportedly revealed sinus bradycardia with junctional escape beats and junctional rhythm, noted on ED telemetry HR 40s however intermittently with drops into the HR 30s and rarely HR mid 20s without associated symptoms  Tx to [facility] for EP evaluation for PPM placement. Plan for PPM on Monday given supratherapuetic INR   Avoid AV angela blocking agents  Monitor on telemetry  Continue with step down status

## 2024-08-09 NOTE — ASSESSMENT & PLAN NOTE
Reported with bradycardia at baseline, not able to tolerate rate controlling medications secondary to this  With symptomatic sinus bradycardia with junctional escape beats/junctional rhythm with plan for EP evaluation for PPM as per the primary problem  Continue telemetry monitoring  Anticoagulated on Coumadin, currently on hold for supratherapuetic INR

## 2024-08-10 LAB
ANION GAP SERPL CALCULATED.3IONS-SCNC: 7 MMOL/L (ref 4–13)
BUN SERPL-MCNC: 25 MG/DL (ref 5–25)
CALCIUM SERPL-MCNC: 9.8 MG/DL (ref 8.4–10.2)
CHLORIDE SERPL-SCNC: 103 MMOL/L (ref 96–108)
CO2 SERPL-SCNC: 32 MMOL/L (ref 21–32)
CREAT SERPL-MCNC: 0.68 MG/DL (ref 0.6–1.3)
ERYTHROCYTE [DISTWIDTH] IN BLOOD BY AUTOMATED COUNT: 15.2 % (ref 11.6–15.1)
GFR SERPL CREATININE-BSD FRML MDRD: 80 ML/MIN/1.73SQ M
GLUCOSE SERPL-MCNC: 90 MG/DL (ref 65–140)
HCT VFR BLD AUTO: 30.1 % (ref 34.8–46.1)
HGB BLD-MCNC: 9 G/DL (ref 11.5–15.4)
INR PPP: 2.49 (ref 0.85–1.19)
MCH RBC QN AUTO: 26.8 PG (ref 26.8–34.3)
MCHC RBC AUTO-ENTMCNC: 29.9 G/DL (ref 31.4–37.4)
MCV RBC AUTO: 90 FL (ref 82–98)
PLATELET # BLD AUTO: 378 THOUSANDS/UL (ref 149–390)
PMV BLD AUTO: 9.5 FL (ref 8.9–12.7)
POTASSIUM SERPL-SCNC: 4.3 MMOL/L (ref 3.5–5.3)
PROTHROMBIN TIME: 26.8 SECONDS (ref 12.3–15)
RBC # BLD AUTO: 3.36 MILLION/UL (ref 3.81–5.12)
SODIUM SERPL-SCNC: 142 MMOL/L (ref 135–147)
WBC # BLD AUTO: 10.1 THOUSAND/UL (ref 4.31–10.16)

## 2024-08-10 PROCEDURE — 85027 COMPLETE CBC AUTOMATED: CPT | Performed by: NURSE PRACTITIONER

## 2024-08-10 PROCEDURE — 85610 PROTHROMBIN TIME: CPT | Performed by: NURSE PRACTITIONER

## 2024-08-10 PROCEDURE — 99232 SBSQ HOSP IP/OBS MODERATE 35: CPT | Performed by: PHYSICIAN ASSISTANT

## 2024-08-10 PROCEDURE — 94640 AIRWAY INHALATION TREATMENT: CPT

## 2024-08-10 PROCEDURE — 94760 N-INVAS EAR/PLS OXIMETRY 1: CPT

## 2024-08-10 PROCEDURE — 80048 BASIC METABOLIC PNL TOTAL CA: CPT | Performed by: NURSE PRACTITIONER

## 2024-08-10 RX ORDER — LEVALBUTEROL INHALATION SOLUTION 0.63 MG/3ML
0.63 SOLUTION RESPIRATORY (INHALATION) ONCE
Status: COMPLETED | OUTPATIENT
Start: 2024-08-10 | End: 2024-08-10

## 2024-08-10 RX ORDER — WARFARIN SODIUM 1 MG/1
1 TABLET ORAL
Status: COMPLETED | OUTPATIENT
Start: 2024-08-10 | End: 2024-08-10

## 2024-08-10 RX ADMIN — MAGNESIUM OXIDE TAB 400 MG (241.3 MG ELEMENTAL MG) 400 MG: 400 (241.3 MG) TAB at 08:37

## 2024-08-10 RX ADMIN — FUROSEMIDE 20 MG: 20 TABLET ORAL at 08:37

## 2024-08-10 RX ADMIN — PRAVASTATIN SODIUM 80 MG: 20 TABLET ORAL at 17:05

## 2024-08-10 RX ADMIN — LEVALBUTEROL HYDROCHLORIDE 0.63 MG: 0.63 SOLUTION RESPIRATORY (INHALATION) at 23:44

## 2024-08-10 RX ADMIN — MAGNESIUM OXIDE TAB 400 MG (241.3 MG ELEMENTAL MG) 400 MG: 400 (241.3 MG) TAB at 17:05

## 2024-08-10 RX ADMIN — PANTOPRAZOLE SODIUM 40 MG: 40 TABLET, DELAYED RELEASE ORAL at 05:19

## 2024-08-10 RX ADMIN — PREGABALIN 25 MG: 25 CAPSULE ORAL at 08:37

## 2024-08-10 RX ADMIN — ACETAMINOPHEN 650 MG: 325 TABLET, FILM COATED ORAL at 22:58

## 2024-08-10 RX ADMIN — SERTRALINE HYDROCHLORIDE 100 MG: 100 TABLET ORAL at 08:37

## 2024-08-10 RX ADMIN — WARFARIN SODIUM 1 MG: 1 TABLET ORAL at 17:05

## 2024-08-10 RX ADMIN — LEVOTHYROXINE SODIUM 50 MCG: 50 TABLET ORAL at 05:19

## 2024-08-10 RX ADMIN — PREGABALIN 25 MG: 25 CAPSULE ORAL at 20:48

## 2024-08-10 RX ADMIN — ASPIRIN 81 MG: 81 TABLET, COATED ORAL at 08:37

## 2024-08-10 RX ADMIN — B-COMPLEX W/ C & FOLIC ACID TAB 1 TABLET: TAB at 08:37

## 2024-08-10 RX ADMIN — B-COMPLEX W/ C & FOLIC ACID TAB 1 TABLET: TAB at 17:05

## 2024-08-10 RX ADMIN — PANTOPRAZOLE SODIUM 40 MG: 40 TABLET, DELAYED RELEASE ORAL at 17:05

## 2024-08-10 RX ADMIN — OXYBUTYNIN 5 MG: 5 TABLET, FILM COATED, EXTENDED RELEASE ORAL at 08:37

## 2024-08-10 NOTE — PLAN OF CARE
Problem: Prexisting or High Potential for Compromised Skin Integrity  Goal: Skin integrity is maintained or improved  Description: INTERVENTIONS:  - Identify patients at risk for skin breakdown  - Assess and monitor skin integrity  - Assess and monitor nutrition and hydration status  - Monitor labs   - Assess for incontinence   - Turn and reposition patient  - Assist with mobility/ambulation  - Relieve pressure over bony prominences  - Avoid friction and shearing  - Provide appropriate hygiene as needed including keeping skin clean and dry  - Evaluate need for skin moisturizer/barrier cream  - Collaborate with interdisciplinary team   - Patient/family teaching  - Consider wound care consult   Outcome: Progressing     Problem: CARDIOVASCULAR - ADULT  Goal: Maintains optimal cardiac output and hemodynamic stability  Description: INTERVENTIONS:  - Monitor I/O, vital signs and rhythm  - Monitor for S/S and trends of decreased cardiac output  - Administer and titrate ordered vasoactive medications to optimize hemodynamic stability  - Assess quality of pulses, skin color and temperature  - Assess for signs of decreased coronary artery perfusion  - Instruct patient to report change in severity of symptoms  Outcome: Progressing  Goal: Absence of cardiac dysrhythmias or at baseline rhythm  Description: INTERVENTIONS:  - Continuous cardiac monitoring, vital signs, obtain 12 lead EKG if ordered  - Administer antiarrhythmic and heart rate control medications as ordered  - Monitor electrolytes and administer replacement therapy as ordered  Outcome: Progressing     Problem: RESPIRATORY - ADULT  Goal: Achieves optimal ventilation and oxygenation  Description: INTERVENTIONS:  - Assess for changes in respiratory status  - Assess for changes in mentation and behavior  - Position to facilitate oxygenation and minimize respiratory effort  - Oxygen administered by appropriate delivery if ordered  - Initiate smoking cessation education  as indicated  - Encourage broncho-pulmonary hygiene including cough, deep breathe, Incentive Spirometry  - Assess the need for suctioning and aspirate as needed  - Assess and instruct to report SOB or any respiratory difficulty  - Respiratory Therapy support as indicated  Outcome: Progressing     Problem: PAIN - ADULT  Goal: Verbalizes/displays adequate comfort level or baseline comfort level  Description: Interventions:  - Encourage patient to monitor pain and request assistance  - Assess pain using appropriate pain scale  - Administer analgesics based on type and severity of pain and evaluate response  - Implement non-pharmacological measures as appropriate and evaluate response  - Consider cultural and social influences on pain and pain management  - Notify physician/advanced practitioner if interventions unsuccessful or patient reports new pain  Outcome: Progressing     Problem: INFECTION - ADULT  Goal: Absence or prevention of progression during hospitalization  Description: INTERVENTIONS:  - Assess and monitor for signs and symptoms of infection  - Monitor lab/diagnostic results  - Monitor all insertion sites, i.e. indwelling lines, tubes, and drains  - Monitor endotracheal if appropriate and nasal secretions for changes in amount and color  - [place] appropriate cooling/warming therapies per order  - Administer medications as ordered  - Instruct and encourage patient and family to use good hand hygiene technique  - Identify and instruct in appropriate isolation precautions for identified infection/condition  Outcome: Progressing  Goal: Absence of fever/infection during neutropenic period  Description: INTERVENTIONS:  - Monitor WBC    Outcome: Progressing     Problem: SAFETY ADULT  Goal: Patient will remain free of falls  Description: INTERVENTIONS:  - Educate patient/family on patient safety including physical limitations  - Instruct patient to call for assistance with activity   - Consult OT/PT to assist  with strengthening/mobility   - Keep Call bell within reach  - Keep bed low and locked with side rails adjusted as appropriate  - Keep care items and personal belongings within reach  - Initiate and maintain comfort rounds  - Make Fall Risk Sign visible to staff  - Offer Toileting every 2 Hours, in advance of need  - Initiate/Maintain 2alarm  - Obtain necessary fall risk management equipment: 2  - Apply yellow socks and bracelet for high fall risk patients  - Consider moving patient to room near nurses station  Outcome: Progressing  Goal: Maintain or return to baseline ADL function  Description: INTERVENTIONS:  -  Assess patient's ability to carry out ADLs; assess patient's baseline for ADL function and identify physical deficits which impact ability to perform ADLs (bathing, care of mouth/teeth, toileting, grooming, dressing, etc.)  - Assess/evaluate cause of self-care deficits   - Assess range of motion  - Assess patient's mobility; develop plan if impaired  - Assess patient's need for assistive devices and provide as appropriate  - Encourage maximum independence but intervene and supervise when necessary  - Involve family in performance of ADLs  - Assess for home care needs following discharge   - Consider OT consult to assist with ADL evaluation and planning for discharge  - Provide patient education as appropriate  Outcome: Progressing  Goal: Maintains/Returns to pre admission functional level  Description: INTERVENTIONS:  - Perform AM-PAC 6 Click Basic Mobility/ Daily Activity assessment daily.  - Set and communicate daily mobility goal to care team and patient/family/caregiver.   - Collaborate with rehabilitation services on mobility goals if consulted  - Perform Range of Motion 2 times a day.  - Reposition patient every 2 hours.  - Dangle patient 2 times a day  - Stand patient 2 times a day  - Ambulate patient 2 times a day  - Out of bed to chair 2 times a day   - Out of bed for meals 2 times a day  - Out of  bed for toileting  - Record patient progress and toleration of activity level   Outcome: Progressing     Problem: DISCHARGE PLANNING  Goal: Discharge to home or other facility with appropriate resources  Description: INTERVENTIONS:  - Identify barriers to discharge w/patient and caregiver  - Arrange for needed discharge resources and transportation as appropriate  - Identify discharge learning needs (meds, wound care, etc.)  - Arrange for interpretive services to assist at discharge as needed  - Refer to Case Management Department for coordinating discharge planning if the patient needs post-hospital services based on physician/advanced practitioner order or complex needs related to functional status, cognitive ability, or social support system  Outcome: Progressing     Problem: Knowledge Deficit  Goal: Patient/family/caregiver demonstrates understanding of disease process, treatment plan, medications, and discharge instructions  Description: Complete learning assessment and assess knowledge base.  Interventions:  - Provide teaching at level of understanding  - Provide teaching via preferred learning methods  Outcome: Progressing

## 2024-08-10 NOTE — ASSESSMENT & PLAN NOTE
Wt Readings from Last 3 Encounters:   08/09/24 85 kg (187 lb 6.3 oz)   08/08/24 85 kg (187 lb 6.3 oz)   08/01/24 80.5 kg (177 lb 7.5 oz)   Previously hospitalized also at [facility] 7/29/2024 - 8/1/2024 for acute exacerbation of this treated with IV diuretics in conjunction with cardiology service  Currently appears euvolemic at this time, continue cardiac medications and monitor volume status closely  C/w lasix 20mg daily   Monitor daily weights

## 2024-08-10 NOTE — ASSESSMENT & PLAN NOTE
Patient was hospitalized at the [facility] 8/6/2024 - 8/8/2024 after a mechanical fall, no per traumatic injuries noted on evaluation.  Appears short-term rehab was recommended however patient declined this; noted with atrial fibrillation with slow VR not changed from prior. Presented again to that campus's ED while picking up Biotel monitor from cardiology office, reportedly felt dizzy then fell backwards.  Trauma imaging negative for acute traumatic injuries, however Biotel monitor reportedly revealed sinus bradycardia with junctional escape beats and junctional rhythm, noted on ED telemetry HR 40s however intermittently with drops into the HR 30s and rarely HR mid 20s without associated symptoms  Tx to [facility] for EP evaluation for PPM placement. Plan for PPM on Monday   Avoid AV angela blocking agents  Monitor on telemetry  Continue with step down status

## 2024-08-10 NOTE — ASSESSMENT & PLAN NOTE
Reported with bradycardia at baseline, not able to tolerate rate controlling medications secondary to this  With symptomatic sinus bradycardia with junctional escape beats/junctional rhythm with plan for EP evaluation for PPM as per the primary problem  Continue telemetry monitoring  Anticoagulated on Coumadin, was on hold due to supratherapeutic INR.  INR 2.4 today, d/w EP will give Coumadin x 1 on 8/10

## 2024-08-10 NOTE — PROGRESS NOTES
[facility]  Progress Note  Name: Marisol Otoole I  MRN: [id number]  Unit/Bed#: -01 I Date of Admission: 8/9/2024   Date of Service: 8/10/2024 I Hospital Day: 1    Assessment & Plan   * Junctional bradycardia  Assessment & Plan  Patient was hospitalized at the [facility] 8/6/2024 - 8/8/2024 after a mechanical fall, no per traumatic injuries noted on evaluation.  Appears short-term rehab was recommended however patient declined this; noted with atrial fibrillation with slow VR not changed from prior. Presented again to that [facility]'s ED while picking up Biotel monitor from cardiology office, reportedly felt dizzy then fell backwards.  Trauma imaging negative for acute traumatic injuries, however Biotel monitor reportedly revealed sinus bradycardia with junctional escape beats and junctional rhythm, noted on ED telemetry HR 40s however intermittently with drops into the HR 30s and rarely HR mid 20s without associated symptoms  [facility] for EP evaluation for PPM placement. Plan for PPM on Monday   Avoid AV angela blocking agents  Monitor on telemetry  Continue with step down status     Paroxysmal A-fib (HCC)  Assessment & Plan  Reported with bradycardia at baseline, not able to tolerate rate controlling medications secondary to this  With symptomatic sinus bradycardia with junctional escape beats/junctional rhythm with plan for EP evaluation for PPM as per the primary problem  Continue telemetry monitoring  Anticoagulated on Coumadin, was on hold due to supratherapeutic INR.  INR 2.4 today, d/w EP will give Coumadin x 1 on 8/10    Chronic hypoxemic respiratory failure (HCC)  Assessment & Plan  Chronic, currently on baseline supplemental oxygen requirement  Continue supplemental oxygen, titrate if needed to maintain appropriate oxygenation    Depression with anxiety  Assessment & Plan  Mood stable, continue home dose Zoloft    Chronic heart failure with preserved ejection  fraction (HFpEF) (HCC)  Assessment & Plan  Wt Readings from Last 3 Encounters:   08/09/24 85 kg (187 lb 6.3 oz)   08/08/24 85 kg (187 lb 6.3 oz)   08/01/24 80.5 kg (177 lb 7.5 oz)   Previously hospitalized also at [facility] 7/29/2024 - 8/1/2024 for acute exacerbation of this treated with IV diuretics in conjunction with cardiology service  Currently appears euvolemic at this time, continue cardiac medications and monitor volume status closely  C/w lasix 20mg daily   Monitor daily weights     Obesity, morbid (HCC)  Assessment & Plan  BMI 45.18  Affecting all facets of life     Hyperlipidemia  Assessment & Plan  C/w statin     Acquired hypothyroidism  Assessment & Plan  TSH at prior hospitalization WNL, continue home dose levothyroxine 50mcg/day           VTE Pharmacologic Prophylaxis: VTE Score: 5 High Risk (Score >/= 5) - Pharmacological DVT Prophylaxis Ordered: warfarin (Coumadin). Sequential Compression Devices Ordered.    Mobility:   Basic Mobility Inpatient Raw Score: 17  JH-HLM Goal: 5: Stand one or more mins  JH-HLM Achieved: 4: Move to chair/commode  JH-HLM Goal NOT achieved. Continue with multidisciplinary rounding and encourage appropriate mobility to improve upon JH-HLM goals.    Patient Centered Rounds: I performed bedside rounds with nursing staff today.   Discussions with Specialists or Other Care Team Provider:     Education and Discussions with Family / Patient: Patient declined call to .     Total Time Spent on Date of Encounter in care of patient: 15 mins. This time was spent on one or more of the following: performing physical exam; counseling and coordination of care; obtaining or reviewing history; documenting in the medical record; reviewing/ordering tests, medications or procedures; communicating with other healthcare professionals and discussing with patient's family/caregivers.    Current Length of Stay: 1 day(s)  Current Patient Status: Inpatient   Certification Statement: The  patient will continue to require additional inpatient hospital stay due to bradycardia needs pacemaker  Discharge Plan: Anticipate discharge in 48-72 hrs to home.    Code Status: Level 3 - DNAR and DNI    Subjective:   No complaints, denies lightheadedness, dizziness, cp, sob     Objective:     Vitals:   Temp (24hrs), Av.2 °F (36.8 °C), Min:97.8 °F (36.6 °C), Max:98.4 °F (36.9 °C)    Temp:  [97.8 °F (36.6 °C)-98.4 °F (36.9 °C)] 98.4 °F (36.9 °C)  HR:  [44-54] 47  Resp:  [16-17] 16  BP: ()/(46-66) 99/66  SpO2:  [86 %-98 %] 90 %  Body mass index is 45.18 kg/m².     Input and Output Summary (last 24 hours):     Intake/Output Summary (Last 24 hours) at 8/10/2024 1454  Last data filed at 8/10/2024 1142  Gross per 24 hour   Intake 600 ml   Output 2400 ml   Net -1800 ml       Physical Exam:   Physical Exam  Vitals reviewed.   Constitutional:       General: She is not in acute distress.     Appearance: She is not toxic-appearing.   HENT:      Head: Normocephalic and atraumatic.   Eyes:      Extraocular Movements: Extraocular movements intact.   Cardiovascular:      Rate and Rhythm: Regular rhythm. Bradycardia present.   Pulmonary:      Effort: Pulmonary effort is normal.      Breath sounds: Normal breath sounds.   Musculoskeletal:         General: Normal range of motion.   Neurological:      General: No focal deficit present.      Mental Status: She is alert and oriented to person, place, and time.   Psychiatric:         Mood and Affect: Mood normal.         Behavior: Behavior normal.         Thought Content: Thought content normal.          Additional Data:     Labs:  Results from last 7 days   Lab Units 08/10/24  0515 24  0542 24  1705   WBC Thousand/uL 10.10   < > 11.37*   HEMOGLOBIN g/dL 9.0*   < > 10.2*   HEMATOCRIT % 30.1*   < > 34.0*   PLATELETS Thousands/uL 378   < > 393*   SEGS PCT %  --   --  74   LYMPHO PCT %  --   --  13*   MONO PCT %  --   --  7   EOS PCT %  --   --  5    < > = values in  this interval not displayed.     Results from last 7 days   Lab Units 08/10/24  0515   SODIUM mmol/L 142   POTASSIUM mmol/L 4.3   CHLORIDE mmol/L 103   CO2 mmol/L 32   BUN mg/dL 25   CREATININE mg/dL 0.68   ANION GAP mmol/L 7   CALCIUM mg/dL 9.8   GLUCOSE RANDOM mg/dL 90     Results from last 7 days   Lab Units 08/10/24  0515   INR  2.49*     Results from last 7 days   Lab Units 24  0822   POC GLUCOSE mg/dl 114         Results from last 7 days   Lab Units 24  0529 24  0518   PROCALCITONIN ng/ml 0.08 0.12       Lines/Drains:  Invasive Devices       Peripheral Intravenous Line  Duration             Peripheral IV 24 Right Antecubital 1 day              Drain  Duration             External Urinary Catheter 4 days                      Telemetry:  Telemetry Orders (From admission, onward)               24 Hour Telemetry Monitoring  Continuous x 24 Hours (Telem)           Question:  Reason for 24 Hour Telemetry  Answer:  Arrhythmias requiring acute medical intervention / PPM or ICD malfunction                     Telemetry Reviewed: Sinus Bradycardia  Indication for Continued Telemetry Use: Arrthymias requiring medical therapy             Imaging: No pertinent imaging reviewed.    Recent Cultures (last 7 days):   Results from last 7 days   Lab Units 24  1917   BLOOD CULTURE  No Growth at 72 hrs.       Last 24 Hours Medication List:   Current Facility-Administered Medications   Medication Dose Route Frequency Provider Last Rate    acetaminophen  650 mg Oral Q6H PRN Zeyad Paez, DO      aspirin  81 mg Oral Daily Zeyad Paez, DO      furosemide  20 mg Oral Daily Zeyad Paez, DO      levothyroxine  50 mcg Oral Early Morning Zeyad Paez, DO      magnesium Oxide  400 mg Oral BID Roxie Al PA-C      multivitamin stress formula  1 tablet Oral BID Zeyad Paez, DO      ondansetron  4 mg Intravenous Q6H PRN Zeyad Paez, DO      oxybutynin  5 mg Oral Daily Zeyad  Jeannine, DO      pantoprazole  40 mg Oral BID AC Zeyad Paez, DO      pravastatin  80 mg Oral Daily With Dinner Zeyad Paez, DO      pregabalin  25 mg Oral BID Zeyad Paez, DO      sertraline  100 mg Oral Daily Zeyad Paez, DO      warfarin  1 mg Oral Once (warfarin) Joyce Maurer PA-C          Today, Patient Was Seen By: Joyce Maurer PA-C    **Please Note: This note may have been constructed using a voice recognition system.**

## 2024-08-11 LAB
BACTERIA BLD CULT: NORMAL
INR PPP: 2.31 (ref 0.85–1.19)
PROTHROMBIN TIME: 25.3 SECONDS (ref 12.3–15)

## 2024-08-11 PROCEDURE — 85610 PROTHROMBIN TIME: CPT | Performed by: PHYSICIAN ASSISTANT

## 2024-08-11 PROCEDURE — 99232 SBSQ HOSP IP/OBS MODERATE 35: CPT | Performed by: PHYSICIAN ASSISTANT

## 2024-08-11 RX ORDER — CEFAZOLIN SODIUM 2 G/50ML
2000 SOLUTION INTRAVENOUS ONCE
Status: COMPLETED | OUTPATIENT
Start: 2024-08-11 | End: 2024-08-12

## 2024-08-11 RX ORDER — WARFARIN SODIUM 1 MG/1
1 TABLET ORAL
Status: COMPLETED | OUTPATIENT
Start: 2024-08-11 | End: 2024-08-11

## 2024-08-11 RX ADMIN — ASPIRIN 81 MG: 81 TABLET, COATED ORAL at 08:37

## 2024-08-11 RX ADMIN — SERTRALINE HYDROCHLORIDE 100 MG: 100 TABLET ORAL at 08:37

## 2024-08-11 RX ADMIN — FUROSEMIDE 20 MG: 20 TABLET ORAL at 08:37

## 2024-08-11 RX ADMIN — PANTOPRAZOLE SODIUM 40 MG: 40 TABLET, DELAYED RELEASE ORAL at 17:20

## 2024-08-11 RX ADMIN — MAGNESIUM OXIDE TAB 400 MG (241.3 MG ELEMENTAL MG) 400 MG: 400 (241.3 MG) TAB at 08:37

## 2024-08-11 RX ADMIN — WARFARIN SODIUM 1 MG: 1 TABLET ORAL at 17:20

## 2024-08-11 RX ADMIN — PREGABALIN 25 MG: 25 CAPSULE ORAL at 21:21

## 2024-08-11 RX ADMIN — PANTOPRAZOLE SODIUM 40 MG: 40 TABLET, DELAYED RELEASE ORAL at 05:18

## 2024-08-11 RX ADMIN — B-COMPLEX W/ C & FOLIC ACID TAB 1 TABLET: TAB at 17:20

## 2024-08-11 RX ADMIN — ACETAMINOPHEN 650 MG: 325 TABLET, FILM COATED ORAL at 22:30

## 2024-08-11 RX ADMIN — PRAVASTATIN SODIUM 80 MG: 20 TABLET ORAL at 17:20

## 2024-08-11 RX ADMIN — MAGNESIUM OXIDE TAB 400 MG (241.3 MG ELEMENTAL MG) 400 MG: 400 (241.3 MG) TAB at 17:20

## 2024-08-11 RX ADMIN — OXYBUTYNIN 5 MG: 5 TABLET, FILM COATED, EXTENDED RELEASE ORAL at 08:37

## 2024-08-11 RX ADMIN — B-COMPLEX W/ C & FOLIC ACID TAB 1 TABLET: TAB at 08:37

## 2024-08-11 RX ADMIN — PREGABALIN 25 MG: 25 CAPSULE ORAL at 08:37

## 2024-08-11 RX ADMIN — LEVOTHYROXINE SODIUM 50 MCG: 50 TABLET ORAL at 05:18

## 2024-08-11 NOTE — ASSESSMENT & PLAN NOTE
Patient was hospitalized at the [facility] 8/6/2024 - 8/8/2024 after a mechanical fall, no per traumatic injuries noted on evaluation.  Appears short-term rehab was recommended however patient declined this; noted with atrial fibrillation with slow VR not changed from prior. Presented again to that campus's ED while picking up Biotel monitor from cardiology office, reportedly felt dizzy then fell backwards.  Trauma imaging negative for acute traumatic injuries, however Biotel monitor reportedly revealed sinus bradycardia with junctional escape beats and junctional rhythm, noted on ED telemetry HR 40s however intermittently with drops into the HR 30s and rarely HR mid 20s without associated symptoms  Tx to [facility] for EP evaluation for PPM placement. Plan for PPM on Monday   Avoid AV angela blocking agents  Monitor on telemetry  Continue with step down status

## 2024-08-11 NOTE — PROGRESS NOTES
[facility]  Progress Note  Name: Marisol Otoole I  MRN: [id number]  Unit/Bed#: -01 I Date of Admission: 8/9/2024   Date of Service: 8/11/2024 I Hospital Day: 2    Assessment & Plan   * Junctional bradycardia  Assessment & Plan  Patient was hospitalized at the [facility] 8/6/2024 - 8/8/2024 after a mechanical fall, no per traumatic injuries noted on evaluation.  Appears short-term rehab was recommended however patient declined this; noted with atrial fibrillation with slow VR not changed from prior. Presented again to that [place] ED while picking up Biotel monitor from cardiology office, reportedly felt dizzy then fell backwards.  Trauma imaging negative for acute traumatic injuries, however Biotel monitor reportedly revealed sinus bradycardia with junctional escape beats and junctional rhythm, noted on ED telemetry HR 40s however intermittently with drops into the HR 30s and rarely HR mid 20s without associated symptoms  Tx to [facility] for EP evaluation for PPM placement. Plan for PPM on Monday   Avoid AV angela blocking agents  Monitor on telemetry  Continue with step down status     Paroxysmal A-fib (HCC)  Assessment & Plan  Reported with bradycardia at baseline, not able to tolerate rate controlling medications secondary to this  With symptomatic sinus bradycardia with junctional escape beats/junctional rhythm with plan for EP evaluation for PPM as per the primary problem  Continue telemetry monitoring  Anticoagulated on Coumadin, was on hold due to supratherapeutic INR.  INR 2.3 today, d/w EP will give Coumadin x 1 on 8/10 and 8/11     Chronic hypoxemic respiratory failure (HCC)  Assessment & Plan  Chronic, currently on baseline supplemental oxygen requirement  Continue supplemental oxygen, titrate if needed to maintain appropriate oxygenation    Depression with anxiety  Assessment & Plan  Mood stable, continue home dose Zoloft    Chronic heart failure with preserved  ejection fraction (HFpEF) (HCC)  Assessment & Plan  Wt Readings from Last 3 Encounters:   08/09/24 85 kg (187 lb 6.3 oz)   08/08/24 85 kg (187 lb 6.3 oz)   08/01/24 80.5 kg (177 lb 7.5 oz)   Previously hospitalized also at [facility] 7/29/2024 - 8/1/2024 for acute exacerbation of this treated with IV diuretics in conjunction with cardiology service  Currently appears euvolemic at this time, continue cardiac medications and monitor volume status closely  C/w lasix 20mg daily   Monitor daily weights     Obesity, morbid (HCC)  Assessment & Plan  BMI 45.18  Affecting all facets of life     Hyperlipidemia  Assessment & Plan  C/w statin     Acquired hypothyroidism  Assessment & Plan  TSH at prior hospitalization WNL, continue home dose levothyroxine 50mcg/day             VTE Pharmacologic Prophylaxis: VTE Score: 5 High Risk (Score >/= 5) - Pharmacological DVT Prophylaxis Ordered: warfarin (Coumadin). Sequential Compression Devices Ordered.    Mobility:   Basic Mobility Inpatient Raw Score: 17  JH-HLM Goal: 5: Stand one or more mins  JH-HLM Achieved: 2: Bed activities/Dependent transfer  JH-HLM Goal NOT achieved. Continue with multidisciplinary rounding and encourage appropriate mobility to improve upon JH-HLM goals.    Patient Centered Rounds: I performed bedside rounds with nursing staff today.   Discussions with Specialists or Other Care Team Provider: EP    Education and Discussions with Family / Patient: Patient declined call to .     Total Time Spent on Date of Encounter in care of patient: 15 mins. This time was spent on one or more of the following: performing physical exam; counseling and coordination of care; obtaining or reviewing history; documenting in the medical record; reviewing/ordering tests, medications or procedures; communicating with other healthcare professionals and discussing with patient's family/caregivers.    Current Length of Stay: 2 day(s)  Current Patient Status: Inpatient    Certification Statement: The patient will continue to require additional inpatient hospital stay due to needs pacemaker tomorrow  Discharge Plan: Anticipate discharge in 48 hrs to home.    Code Status: Level 3 - DNAR and DNI    Subjective:   No acute complaints, denies sob, lightheadedness or dizziness     Objective:     Vitals:   Temp (24hrs), Av.4 °F (36.9 °C), Min:98.1 °F (36.7 °C), Max:98.8 °F (37.1 °C)    Temp:  [98.1 °F (36.7 °C)-98.8 °F (37.1 °C)] 98.5 °F (36.9 °C)  HR:  [38-60] 39  Resp:  [16-19] 18  BP: (122-146)/(49-79) 122/50  SpO2:  [91 %-98 %] 96 %  Body mass index is 45.18 kg/m².     Input and Output Summary (last 24 hours):     Intake/Output Summary (Last 24 hours) at 2024 1241  Last data filed at 2024 1053  Gross per 24 hour   Intake 558 ml   Output 1300 ml   Net -742 ml       Physical Exam:   Physical Exam  Vitals reviewed.   Constitutional:       General: She is not in acute distress.     Appearance: She is not toxic-appearing.   HENT:      Head: Normocephalic and atraumatic.   Eyes:      Extraocular Movements: Extraocular movements intact.   Cardiovascular:      Rate and Rhythm: Regular rhythm. Bradycardia present.   Pulmonary:      Effort: Pulmonary effort is normal. No respiratory distress.   Musculoskeletal:         General: Normal range of motion.   Neurological:      General: No focal deficit present.      Mental Status: She is alert and oriented to person, place, and time.   Psychiatric:         Mood and Affect: Mood normal.         Behavior: Behavior normal.         Thought Content: Thought content normal.          Additional Data:     Labs:  Results from last 7 days   Lab Units 08/10/24  0515 24  0542 24  1705   WBC Thousand/uL 10.10   < > 11.37*   HEMOGLOBIN g/dL 9.0*   < > 10.2*   HEMATOCRIT % 30.1*   < > 34.0*   PLATELETS Thousands/uL 378   < > 393*   SEGS PCT %  --   --  74   LYMPHO PCT %  --   --  13*   MONO PCT %  --   --  7   EOS PCT %  --   --  5    < >  = values in this interval not displayed.     Results from last 7 days   Lab Units 08/10/24  0515   SODIUM mmol/L 142   POTASSIUM mmol/L 4.3   CHLORIDE mmol/L 103   CO2 mmol/L 32   BUN mg/dL 25   CREATININE mg/dL 0.68   ANION GAP mmol/L 7   CALCIUM mg/dL 9.8   GLUCOSE RANDOM mg/dL 90     Results from last 7 days   Lab Units 08/11/24  0516   INR  2.31*     Results from last 7 days   Lab Units 08/06/24  0822   POC GLUCOSE mg/dl 114         Results from last 7 days   Lab Units 08/08/24  0529 08/07/24  0518   PROCALCITONIN ng/ml 0.08 0.12       Lines/Drains:  Invasive Devices       Peripheral Intravenous Line  Duration             Peripheral IV 08/08/24 Right Antecubital 2 days              Drain  Duration             External Urinary Catheter 5 days                      Telemetry:  Telemetry Orders (From admission, onward)               24 Hour Telemetry Monitoring  Continuous x 24 Hours (Telem)        Question:  Reason for 24 Hour Telemetry  Answer:  Arrhythmias requiring acute medical intervention / PPM or ICD malfunction                     Telemetry Reviewed: Pause(s)  Indication for Continued Telemetry Use: Arrthymias requiring medical therapy             Imaging: No pertinent imaging reviewed.    Recent Cultures (last 7 days):   Results from last 7 days   Lab Units 08/06/24  1917   BLOOD CULTURE  No Growth After 4 Days.       Last 24 Hours Medication List:   Current Facility-Administered Medications   Medication Dose Route Frequency Provider Last Rate    acetaminophen  650 mg Oral Q6H PRN Zeyad Paez DO      aspirin  81 mg Oral Daily Zeyad Paez DO      cefazolin  2,000 mg Intravenous Once Roxie Al PA-C      furosemide  20 mg Oral Daily Zeyad Paez DO      levothyroxine  50 mcg Oral Early Morning Zeyad Paez DO      magnesium Oxide  400 mg Oral BID Roxie Al PA-C      multivitamin stress formula  1 tablet Oral BID Zeyad Paez DO      ondansetron  4 mg Intravenous Q6H PRN  Zeyad Paez, DO      oxybutynin  5 mg Oral Daily Zeyad Paez, DO      pantoprazole  40 mg Oral BID AC Zeyad Paez, DO      pravastatin  80 mg Oral Daily With Dinner Zeyad Paez, DO      pregabalin  25 mg Oral BID Zeyad Paez, DO      sertraline  100 mg Oral Daily Zeyad Paez, DO      warfarin  1 mg Oral Once (warfarin) Joyce Maurer PA-C          Today, Patient Was Seen By: Joyce Maurer PA-C    **Please Note: This note may have been constructed using a voice recognition system.**

## 2024-08-11 NOTE — PLAN OF CARE
Problem: Prexisting or High Potential for Compromised Skin Integrity  Goal: Skin integrity is maintained or improved  Description: INTERVENTIONS:  - Identify patients at risk for skin breakdown  - Assess and monitor skin integrity  - Assess and monitor nutrition and hydration status  - Monitor labs   - Assess for incontinence   - Turn and reposition patient  - Assist with mobility/ambulation  - Relieve pressure over bony prominences  - Avoid friction and shearing  - Provide appropriate hygiene as needed including keeping skin clean and dry  - Evaluate need for skin moisturizer/barrier cream  - Collaborate with interdisciplinary team   - Patient/family teaching  - Consider wound care consult   Outcome: Progressing     Problem: CARDIOVASCULAR - ADULT  Goal: Maintains optimal cardiac output and hemodynamic stability  Description: INTERVENTIONS:  - Monitor I/O, vital signs and rhythm  - Monitor for S/S and trends of decreased cardiac output  - Administer and titrate ordered vasoactive medications to optimize hemodynamic stability  - Assess quality of pulses, skin color and temperature  - Assess for signs of decreased coronary artery perfusion  - Instruct patient to report change in severity of symptoms  Outcome: Progressing  Goal: Absence of cardiac dysrhythmias or at baseline rhythm  Description: INTERVENTIONS:  - Continuous cardiac monitoring, vital signs, obtain 12 lead EKG if ordered  - Administer antiarrhythmic and heart rate control medications as ordered  - Monitor electrolytes and administer replacement therapy as ordered  Outcome: Progressing     Problem: RESPIRATORY - ADULT  Goal: Achieves optimal ventilation and oxygenation  Description: INTERVENTIONS:  - Assess for changes in respiratory status  - Assess for changes in mentation and behavior  - Position to facilitate oxygenation and minimize respiratory effort  - Oxygen administered by appropriate delivery if ordered  - Initiate smoking cessation education  as indicated  - Encourage broncho-pulmonary hygiene including cough, deep breathe, Incentive Spirometry  - Assess the need for suctioning and aspirate as needed  - Assess and instruct to report SOB or any respiratory difficulty  - Respiratory Therapy support as indicated  Outcome: Progressing     Problem: PAIN - ADULT  Goal: Verbalizes/displays adequate comfort level or baseline comfort level  Description: Interventions:  - Encourage patient to monitor pain and request assistance  - Assess pain using appropriate pain scale  - Administer analgesics based on type and severity of pain and evaluate response  - Implement non-pharmacological measures as appropriate and evaluate response  - Consider cultural and social influences on pain and pain management  - Notify physician/advanced practitioner if interventions unsuccessful or patient reports new pain  Outcome: Progressing     Problem: INFECTION - ADULT  Goal: Absence or prevention of progression during hospitalization  Description: INTERVENTIONS:  - Assess and monitor for signs and symptoms of infection  - Monitor lab/diagnostic results  - Monitor all insertion sites, i.e. indwelling lines, tubes, and drains  - Monitor endotracheal if appropriate and nasal secretions for changes in amount and color  - Tullos appropriate cooling/warming therapies per order  - Administer medications as ordered  - Instruct and encourage patient and family to use good hand hygiene technique  - Identify and instruct in appropriate isolation precautions for identified infection/condition  Outcome: Progressing  Goal: Absence of fever/infection during neutropenic period  Description: INTERVENTIONS:  - Monitor WBC    Outcome: Progressing     Problem: SAFETY ADULT  Goal: Patient will remain free of falls  Description: INTERVENTIONS:  - Educate patient/family on patient safety including physical limitations  - Instruct patient to call for assistance with activity   - Consult OT/PT to assist  with strengthening/mobility   - Keep Call bell within reach  - Keep bed low and locked with side rails adjusted as appropriate  - Keep care items and personal belongings within reach  - Initiate and maintain comfort rounds  - Make Fall Risk Sign visible to staff  - Offer Toileting every 2 Hours, in advance of need  - Initiate/Maintain 2alarm  - Obtain necessary fall risk management equipment: 2  - Apply yellow socks and bracelet for high fall risk patients  - Consider moving patient to room near nurses station  Outcome: Progressing  Goal: Maintain or return to baseline ADL function  Description: INTERVENTIONS:  -  Assess patient's ability to carry out ADLs; assess patient's baseline for ADL function and identify physical deficits which impact ability to perform ADLs (bathing, care of mouth/teeth, toileting, grooming, dressing, etc.)  - Assess/evaluate cause of self-care deficits   - Assess range of motion  - Assess patient's mobility; develop plan if impaired  - Assess patient's need for assistive devices and provide as appropriate  - Encourage maximum independence but intervene and supervise when necessary  - Involve family in performance of ADLs  - Assess for home care needs following discharge   - Consider OT consult to assist with ADL evaluation and planning for discharge  - Provide patient education as appropriate  Outcome: Progressing  Goal: Maintains/Returns to pre admission functional level  Description: INTERVENTIONS:  - Perform AM-PAC 6 Click Basic Mobility/ Daily Activity assessment daily.  - Set and communicate daily mobility goal to care team and patient/family/caregiver.   - Collaborate with rehabilitation services on mobility goals if consulted  - Perform Range of Motion 2 times a day.  - Reposition patient every 2 hours.  - Dangle patient 2 times a day  - Stand patient 2 times a day  - Ambulate patient 2 times a day  - Out of bed to chair 2 times a day   - Out of bed for meals 2 times a day  - Out of  bed for toileting  - Record patient progress and toleration of activity level   Outcome: Progressing     Problem: DISCHARGE PLANNING  Goal: Discharge to home or other facility with appropriate resources  Description: INTERVENTIONS:  - Identify barriers to discharge w/patient and caregiver  - Arrange for needed discharge resources and transportation as appropriate  - Identify discharge learning needs (meds, wound care, etc.)  - Arrange for interpretive services to assist at discharge as needed  - Refer to Case Management Department for coordinating discharge planning if the patient needs post-hospital services based on physician/advanced practitioner order or complex needs related to functional status, cognitive ability, or social support system  Outcome: Progressing     Problem: Knowledge Deficit  Goal: Patient/family/caregiver demonstrates understanding of disease process, treatment plan, medications, and discharge instructions  Description: Complete learning assessment and assess knowledge base.  Interventions:  - Provide teaching at level of understanding  - Provide teaching via preferred learning methods  Outcome: Progressing

## 2024-08-11 NOTE — ASSESSMENT & PLAN NOTE
Reported with bradycardia at baseline, not able to tolerate rate controlling medications secondary to this  With symptomatic sinus bradycardia with junctional escape beats/junctional rhythm with plan for EP evaluation for PPM as per the primary problem  Continue telemetry monitoring  Anticoagulated on Coumadin, was on hold due to supratherapeutic INR.  INR 2.3 today, d/w EP will give Coumadin x 1 on 8/10 and 8/11

## 2024-08-11 NOTE — ASSESSMENT & PLAN NOTE
Wt Readings from Last 3 Encounters:   08/09/24 85 kg (187 lb 6.3 oz)   08/08/24 85 kg (187 lb 6.3 oz)   08/01/24 80.5 kg (177 lb 7.5 oz)   Previously hospitalized also at [facility] 7/29/2024 - 8/1/2024 for acute exacerbation of this treated with IV diuretics in conjunction with cardiology service  Currently appears euvolemic at this time, continue cardiac medications and monitor volume status closely  C/w lasix 20mg daily   Monitor daily weights

## 2024-08-11 NOTE — QUICK NOTE
Service    Marisol Otoole is an 82 y/o female with sinus node dysfunction, symptomatic bradycardia with heart rates in 30s, and pauses lasting up to 4.5 seconds with h/o syncope, fatigue and lightheadedness. She will undergo implantation of a dual chamber PPM tomorrow. Case was cancelled Friday due to elevated INR at 3.38, mild leukocytotis and anemia. Hemoglobin improved, WBC improved, and INR now 2.31.    Plan:  -- NPO after midnight for implantation of dual chamber PPM tomorrow  -- keep INR between 2-3 (prefer close to 2 for pacemaker implant)  -- ok to give low dose of warfarin tonight  -- updated patient, all questions answered

## 2024-08-12 ENCOUNTER — ANESTHESIA EVENT (INPATIENT)
Dept: NON INVASIVE DIAGNOSTICS | Facility: HOSPITAL | Age: 84
DRG: 243 | End: 2024-08-12
Payer: MEDICARE

## 2024-08-12 ENCOUNTER — APPOINTMENT (INPATIENT)
Dept: RADIOLOGY | Facility: HOSPITAL | Age: 84
DRG: 243 | End: 2024-08-12
Payer: MEDICARE

## 2024-08-12 PROBLEM — R07.9 CHEST PAIN: Status: ACTIVE | Noted: 2024-08-12

## 2024-08-12 LAB
2HR DELTA HS TROPONIN: -65 NG/L
2HR DELTA HS TROPONIN: 0 NG/L
4HR DELTA HS TROPONIN: 348 NG/L
ANION GAP SERPL CALCULATED.3IONS-SCNC: 7 MMOL/L (ref 4–13)
ATRIAL RATE: 0 BPM
ATRIAL RATE: 48 BPM
ATRIAL RATE: 56 BPM
ATRIAL RATE: 60 BPM
ATRIAL RATE: 60 BPM
BUN SERPL-MCNC: 23 MG/DL (ref 5–25)
CALCIUM SERPL-MCNC: 9.4 MG/DL (ref 8.4–10.2)
CARDIAC TROPONIN I PNL SERPL HS: 13 NG/L
CARDIAC TROPONIN I PNL SERPL HS: 13 NG/L
CARDIAC TROPONIN I PNL SERPL HS: 268 NG/L
CARDIAC TROPONIN I PNL SERPL HS: 333 NG/L
CARDIAC TROPONIN I PNL SERPL HS: 361 NG/L
CHLORIDE SERPL-SCNC: 100 MMOL/L (ref 96–108)
CO2 SERPL-SCNC: 32 MMOL/L (ref 21–32)
CREAT SERPL-MCNC: 0.68 MG/DL (ref 0.6–1.3)
ERYTHROCYTE [DISTWIDTH] IN BLOOD BY AUTOMATED COUNT: 15.2 % (ref 11.6–15.1)
GFR SERPL CREATININE-BSD FRML MDRD: 80 ML/MIN/1.73SQ M
GLUCOSE SERPL-MCNC: 102 MG/DL (ref 65–140)
HCT VFR BLD AUTO: 31 % (ref 34.8–46.1)
HGB BLD-MCNC: 9 G/DL (ref 11.5–15.4)
INR PPP: 2.11 (ref 0.85–1.19)
MCH RBC QN AUTO: 26.3 PG (ref 26.8–34.3)
MCHC RBC AUTO-ENTMCNC: 29 G/DL (ref 31.4–37.4)
MCV RBC AUTO: 91 FL (ref 82–98)
P AXIS: -19 DEGREES
P AXIS: 33 DEGREES
P AXIS: 40 DEGREES
P AXIS: 45 DEGREES
PLATELET # BLD AUTO: 365 THOUSANDS/UL (ref 149–390)
PMV BLD AUTO: 9.7 FL (ref 8.9–12.7)
POTASSIUM SERPL-SCNC: 4.3 MMOL/L (ref 3.5–5.3)
PR INTERVAL: 240 MS
PR INTERVAL: 256 MS
PR INTERVAL: 294 MS
PR INTERVAL: 326 MS
PROTHROMBIN TIME: 23.7 SECONDS (ref 12.3–15)
QRS AXIS: -24 DEGREES
QRS AXIS: -25 DEGREES
QRS AXIS: -26 DEGREES
QRS AXIS: -28 DEGREES
QRS AXIS: 0 DEGREES
QRSD INTERVAL: 0 MS
QRSD INTERVAL: 86 MS
QRSD INTERVAL: 86 MS
QRSD INTERVAL: 88 MS
QRSD INTERVAL: 90 MS
QT INTERVAL: 0 MS
QT INTERVAL: 428 MS
QT INTERVAL: 440 MS
QT INTERVAL: 442 MS
QT INTERVAL: 470 MS
QTC INTERVAL: 0 MS
QTC INTERVAL: 419 MS
QTC INTERVAL: 426 MS
QTC INTERVAL: 428 MS
QTC INTERVAL: 440 MS
RBC # BLD AUTO: 3.42 MILLION/UL (ref 3.81–5.12)
SODIUM SERPL-SCNC: 139 MMOL/L (ref 135–147)
T WAVE AXIS: 0 DEGREES
T WAVE AXIS: 26 DEGREES
T WAVE AXIS: 34 DEGREES
T WAVE AXIS: 34 DEGREES
T WAVE AXIS: 41 DEGREES
VENTRICULAR RATE: 0 BPM
VENTRICULAR RATE: 48 BPM
VENTRICULAR RATE: 56 BPM
VENTRICULAR RATE: 60 BPM
VENTRICULAR RATE: 60 BPM
WBC # BLD AUTO: 12.34 THOUSAND/UL (ref 4.31–10.16)

## 2024-08-12 PROCEDURE — C1769 GUIDE WIRE: HCPCS | Performed by: STUDENT IN AN ORGANIZED HEALTH CARE EDUCATION/TRAINING PROGRAM

## 2024-08-12 PROCEDURE — 33208 INSRT HEART PM ATRIAL & VENT: CPT | Performed by: STUDENT IN AN ORGANIZED HEALTH CARE EDUCATION/TRAINING PROGRAM

## 2024-08-12 PROCEDURE — C1892 INTRO/SHEATH,FIXED,PEEL-AWAY: HCPCS | Performed by: STUDENT IN AN ORGANIZED HEALTH CARE EDUCATION/TRAINING PROGRAM

## 2024-08-12 PROCEDURE — 93005 ELECTROCARDIOGRAM TRACING: CPT

## 2024-08-12 PROCEDURE — 84484 ASSAY OF TROPONIN QUANT: CPT | Performed by: STUDENT IN AN ORGANIZED HEALTH CARE EDUCATION/TRAINING PROGRAM

## 2024-08-12 PROCEDURE — C1785 PMKR, DUAL, RATE-RESP: HCPCS | Performed by: STUDENT IN AN ORGANIZED HEALTH CARE EDUCATION/TRAINING PROGRAM

## 2024-08-12 PROCEDURE — 85610 PROTHROMBIN TIME: CPT | Performed by: PHYSICIAN ASSISTANT

## 2024-08-12 PROCEDURE — C1898 LEAD, PMKR, OTHER THAN TRANS: HCPCS | Performed by: STUDENT IN AN ORGANIZED HEALTH CARE EDUCATION/TRAINING PROGRAM

## 2024-08-12 PROCEDURE — 99232 SBSQ HOSP IP/OBS MODERATE 35: CPT | Performed by: PHYSICIAN ASSISTANT

## 2024-08-12 PROCEDURE — 85027 COMPLETE CBC AUTOMATED: CPT | Performed by: PHYSICIAN ASSISTANT

## 2024-08-12 PROCEDURE — 93010 ELECTROCARDIOGRAM REPORT: CPT | Performed by: INTERNAL MEDICINE

## 2024-08-12 PROCEDURE — 84484 ASSAY OF TROPONIN QUANT: CPT | Performed by: PHYSICIAN ASSISTANT

## 2024-08-12 PROCEDURE — 80048 BASIC METABOLIC PNL TOTAL CA: CPT | Performed by: PHYSICIAN ASSISTANT

## 2024-08-12 PROCEDURE — 71045 X-RAY EXAM CHEST 1 VIEW: CPT

## 2024-08-12 DEVICE — LEAD 5076-52 MRI US RCMCRD
Type: IMPLANTABLE DEVICE | Site: CHEST  WALL | Status: FUNCTIONAL
Brand: CAPSUREFIX NOVUS MRI™ SURESCAN®

## 2024-08-12 DEVICE — LEAD 3830 US MKT/ 69CM MRI LBBAP
Type: IMPLANTABLE DEVICE | Site: HEART | Status: FUNCTIONAL
Brand: SELECTSECURE™ MRI SURESCAN™

## 2024-08-12 DEVICE — ENVELOPE CMRM6122 ABSORB MED MR
Type: IMPLANTABLE DEVICE | Site: CHEST  WALL | Status: FUNCTIONAL
Brand: TYRX™

## 2024-08-12 DEVICE — IPG W1DR01 AZURE XT DR MRI USA
Type: IMPLANTABLE DEVICE | Site: HEART | Status: FUNCTIONAL
Brand: AZURE™ XT DR MRI SURESCAN™

## 2024-08-12 RX ORDER — SODIUM CHLORIDE 9 MG/ML
INJECTION, SOLUTION INTRAVENOUS CONTINUOUS PRN
Status: DISCONTINUED | OUTPATIENT
Start: 2024-08-12 | End: 2024-08-12

## 2024-08-12 RX ORDER — PROPOFOL 10 MG/ML
INJECTION, EMULSION INTRAVENOUS CONTINUOUS PRN
Status: DISCONTINUED | OUTPATIENT
Start: 2024-08-12 | End: 2024-08-12

## 2024-08-12 RX ORDER — ONDANSETRON 2 MG/ML
INJECTION INTRAMUSCULAR; INTRAVENOUS AS NEEDED
Status: DISCONTINUED | OUTPATIENT
Start: 2024-08-12 | End: 2024-08-12

## 2024-08-12 RX ORDER — ACETAMINOPHEN 325 MG/1
650 TABLET ORAL EVERY 4 HOURS PRN
Status: DISCONTINUED | OUTPATIENT
Start: 2024-08-12 | End: 2024-08-13 | Stop reason: HOSPADM

## 2024-08-12 RX ORDER — EPHEDRINE SULFATE 50 MG/ML
INJECTION INTRAVENOUS AS NEEDED
Status: DISCONTINUED | OUTPATIENT
Start: 2024-08-12 | End: 2024-08-12

## 2024-08-12 RX ORDER — WARFARIN SODIUM 2.5 MG/1
2.5 TABLET ORAL
Status: DISCONTINUED | OUTPATIENT
Start: 2024-08-12 | End: 2024-08-13 | Stop reason: HOSPADM

## 2024-08-12 RX ORDER — LIDOCAINE HYDROCHLORIDE 10 MG/ML
INJECTION, SOLUTION EPIDURAL; INFILTRATION; INTRACAUDAL; PERINEURAL AS NEEDED
Status: DISCONTINUED | OUTPATIENT
Start: 2024-08-12 | End: 2024-08-12

## 2024-08-12 RX ORDER — CALCIUM CARBONATE 500 MG/1
1000 TABLET, CHEWABLE ORAL 2 TIMES DAILY PRN
Status: DISCONTINUED | OUTPATIENT
Start: 2024-08-12 | End: 2024-08-13 | Stop reason: HOSPADM

## 2024-08-12 RX ORDER — GENTAMICIN 40 MG/ML
INJECTION, SOLUTION INTRAMUSCULAR; INTRAVENOUS CODE/TRAUMA/SEDATION MEDICATION
Status: DISCONTINUED | OUTPATIENT
Start: 2024-08-12 | End: 2024-08-12 | Stop reason: HOSPADM

## 2024-08-12 RX ORDER — FENTANYL CITRATE 50 UG/ML
INJECTION, SOLUTION INTRAMUSCULAR; INTRAVENOUS AS NEEDED
Status: DISCONTINUED | OUTPATIENT
Start: 2024-08-12 | End: 2024-08-12

## 2024-08-12 RX ADMIN — ASPIRIN 81 MG: 81 TABLET, COATED ORAL at 08:36

## 2024-08-12 RX ADMIN — PRAVASTATIN SODIUM 80 MG: 20 TABLET ORAL at 18:02

## 2024-08-12 RX ADMIN — LIDOCAINE HYDROCHLORIDE 50 MG: 10 INJECTION, SOLUTION EPIDURAL; INFILTRATION; INTRACAUDAL; PERINEURAL at 14:19

## 2024-08-12 RX ADMIN — PHENYLEPHRINE HYDROCHLORIDE 50 MCG/MIN: 10 INJECTION INTRAVENOUS at 14:28

## 2024-08-12 RX ADMIN — ACETAMINOPHEN 650 MG: 325 TABLET ORAL at 20:18

## 2024-08-12 RX ADMIN — ACETAMINOPHEN 650 MG: 325 TABLET, FILM COATED ORAL at 09:32

## 2024-08-12 RX ADMIN — WARFARIN SODIUM 2.5 MG: 2.5 TABLET ORAL at 18:03

## 2024-08-12 RX ADMIN — OXYBUTYNIN 5 MG: 5 TABLET, FILM COATED, EXTENDED RELEASE ORAL at 08:36

## 2024-08-12 RX ADMIN — B-COMPLEX W/ C & FOLIC ACID TAB 1 TABLET: TAB at 08:36

## 2024-08-12 RX ADMIN — PANTOPRAZOLE SODIUM 40 MG: 40 TABLET, DELAYED RELEASE ORAL at 18:10

## 2024-08-12 RX ADMIN — PREGABALIN 25 MG: 25 CAPSULE ORAL at 20:18

## 2024-08-12 RX ADMIN — B-COMPLEX W/ C & FOLIC ACID TAB 1 TABLET: TAB at 18:03

## 2024-08-12 RX ADMIN — SERTRALINE HYDROCHLORIDE 100 MG: 100 TABLET ORAL at 08:37

## 2024-08-12 RX ADMIN — ONDANSETRON 4 MG: 2 INJECTION INTRAMUSCULAR; INTRAVENOUS at 14:19

## 2024-08-12 RX ADMIN — PANTOPRAZOLE SODIUM 40 MG: 40 TABLET, DELAYED RELEASE ORAL at 05:16

## 2024-08-12 RX ADMIN — MAGNESIUM OXIDE TAB 400 MG (241.3 MG ELEMENTAL MG) 400 MG: 400 (241.3 MG) TAB at 08:37

## 2024-08-12 RX ADMIN — PREGABALIN 25 MG: 25 CAPSULE ORAL at 08:37

## 2024-08-12 RX ADMIN — PROPOFOL 80 MCG/KG/MIN: 10 INJECTION, EMULSION INTRAVENOUS at 14:19

## 2024-08-12 RX ADMIN — FENTANYL CITRATE 25 MCG: 50 INJECTION INTRAMUSCULAR; INTRAVENOUS at 14:24

## 2024-08-12 RX ADMIN — EPHEDRINE SULFATE 15 MG: 50 INJECTION, SOLUTION INTRAVENOUS at 14:30

## 2024-08-12 RX ADMIN — LEVOTHYROXINE SODIUM 50 MCG: 50 TABLET ORAL at 05:16

## 2024-08-12 RX ADMIN — CALCIUM CARBONATE (ANTACID) CHEW TAB 500 MG 1000 MG: 500 CHEW TAB at 09:59

## 2024-08-12 RX ADMIN — SODIUM CHLORIDE: 0.9 INJECTION, SOLUTION INTRAVENOUS at 14:11

## 2024-08-12 RX ADMIN — MAGNESIUM OXIDE TAB 400 MG (241.3 MG ELEMENTAL MG) 400 MG: 400 (241.3 MG) TAB at 18:02

## 2024-08-12 RX ADMIN — FUROSEMIDE 20 MG: 20 TABLET ORAL at 08:36

## 2024-08-12 RX ADMIN — CEFAZOLIN SODIUM 2000 MG: 2 SOLUTION INTRAVENOUS at 14:20

## 2024-08-12 NOTE — ASSESSMENT & PLAN NOTE
Pt reported burning mid chest pain radiating to her throat after she got her AM pills.  Suspect GERD   EKG with no ischemia, troponin x 1 negative  TUMS

## 2024-08-12 NOTE — ANESTHESIA PREPROCEDURE EVALUATION
Procedure:  Cardiac pacer implant (Chest)    Relevant Problems   CARDIO   (+) AVB (atrioventricular block)   (+) Acute diastolic congestive heart failure (HCC)   (+) Chest pain   (+) Coronary artery disease involving native coronary artery of native heart without angina pectoris   (+) Hyperlipidemia   (+) Junctional bradycardia   (+) Paroxysmal A-fib (HCC)   (+) Primary hypertension      ENDO   (+) Acquired hypothyroidism      GI/HEPATIC   (+) Gastroesophageal reflux disease without esophagitis      /RENAL   (+) ERIN (acute kidney injury) (HCC)   (+) Stage 3a chronic kidney disease (HCC)      HEMATOLOGY   (+) Iron deficiency anemia secondary to inadequate dietary iron intake      MUSCULOSKELETAL   (+) Primary osteoarthritis of left shoulder      NEURO/PSYCH   (+) Depression with anxiety   (+) Depression, recurrent (HCC)      PULMONARY   (+) COPD, mild (HCC)   (+) Chronic hypoxemic respiratory failure (HCC)   (+) JANICE (obstructive sleep apnea)      TTE 7/30/24:  Physical Exam    Airway    Mallampati score: III  TM Distance: >3 FB  Neck ROM: full     Dental       Cardiovascular      Pulmonary      Other Findings  post-pubertal.•  Left Ventricle: Left ventricular cavity size is normal. Wall thickness is normal. The left ventricular ejection fraction is 65%. Systolic function is normal. Wall motion is normal. Diastolic function is normal.  •  Right Ventricle: Right ventricular cavity size is dilated. Systolic function is normal.  •  Left Atrium: The atrium is mildly dilated.  •  Right Atrium: The atrium is mildly dilated.  •  Aortic Valve: There is a TAVR bioprosthetic valve.  Peak velocity was 2.72 m/s.  Peak and mean gradients across the valve were 29 and 14 mmHg respectively.  Aortic valve area by the continuity equation method was 1.87 cm². There is mild regurgitation.  •  Mitral Valve: There is moderate annular calcification. There is mild regurgitation. There is moderate stenosis.  Mitral valve area by the PHT  method was 1 cm².  Mean pressure gradient across the valve was 4 mmHg.  •  Tricuspid Valve: There is moderate to severe regurgitation. The right ventricular systolic pressure is mildly to moderately elevated. The estimated right ventricular systolic pressure is 54.00 mmHg.  •  Pulmonic Valve: There is mild regurgitation.  •  IVC/SVC: The right atrial pressure is estimated at 8.0 mmHg. The inferior vena cava is dilated. Respirophasic changes were normal.     HB=9.0    BMP: WNL    EKG:SB    Anesthesia Plan  ASA Score- 4     Anesthesia Type- IV sedation with anesthesia with ASA Monitors.         Additional Monitors:     Airway Plan:            Plan Factors-    Chart reviewed. EKG reviewed. Imaging results reviewed. Existing labs reviewed. Patient summary reviewed.    Patient is not a current smoker.  Patient did not smoke on day of surgery.            Induction- intravenous.    Postoperative Plan-     Perioperative Resuscitation Plan - Level 1 - Full Code.       Informed Consent- Anesthetic plan and risks discussed with patient.  I personally reviewed this patient with the CRNA. Discussed and agreed on the Anesthesia Plan with the CRNA..

## 2024-08-12 NOTE — PROGRESS NOTES
[facility]  Progress Note  Name: Marisol Otoole I  MRN: [id number]  Unit/Bed#: -01 I Date of Admission: 8/9/2024   Date of Service: 8/12/2024 I Hospital Day: 3    Assessment & Plan   * Junctional bradycardia  Assessment & Plan  Patient was hospitalized at the [facility] 8/6/2024 - 8/8/2024 after a mechanical fall, no per traumatic injuries noted on evaluation.  Appears short-term rehab was recommended however patient declined this; noted with atrial fibrillation with slow VR not changed from prior. Presented again to that [place]'s ED while picking up Biotel monitor from cardiology office, reportedly felt dizzy then fell backwards.  Trauma imaging negative for acute traumatic injuries, however Biotel monitor reportedly revealed sinus bradycardia with junctional escape beats and junctional rhythm, noted on ED telemetry HR 40s however intermittently with drops into the HR 30s and rarely HR mid 20s without associated symptoms  Tx to [facility] for EP evaluation for PPM placement. Plan for PPM on Monday   Avoid AV angela blocking agents  Monitor on telemetry  Continue with step down status     Chest pain  Assessment & Plan  Pt reported burning mid chest pain radiating to her throat after she got her AM pills.  Suspect GERD   EKG with no ischemia, troponin x 1 negative  TUMS     Paroxysmal A-fib (HCC)  Assessment & Plan  Reported with bradycardia at baseline, not able to tolerate rate controlling medications secondary to this  With symptomatic sinus bradycardia with junctional escape beats/junctional rhythm with plan for EP evaluation for PPM as per the primary problem  Continue telemetry monitoring  Anticoagulated on Coumadin, was on hold due to supratherapeutic INR.  Has been on low dose Coumadin over weekend pending PPM  Resume home 2.5 mg starting 8/12 following pacemaker placement.  Monitor INR     Chronic hypoxemic respiratory failure (HCC)  Assessment & Plan  Chronic, currently on  baseline supplemental oxygen requirement  Continue supplemental oxygen, titrate if needed to maintain appropriate oxygenation    Depression with anxiety  Assessment & Plan  Mood stable, continue home dose Zoloft    Chronic heart failure with preserved ejection fraction (HFpEF) (HCC)  Assessment & Plan  Wt Readings from Last 3 Encounters:   08/09/24 85 kg (187 lb 6.3 oz)   08/08/24 85 kg (187 lb 6.3 oz)   08/01/24 80.5 kg (177 lb 7.5 oz)   Previously hospitalized also at [facility] 7/29/2024 - 8/1/2024 for acute exacerbation of this treated with IV diuretics in conjunction with cardiology service  Currently appears euvolemic at this time, continue cardiac medications and monitor volume status closely  C/w lasix 20mg daily   Monitor daily weights     Obesity, morbid (HCC)  Assessment & Plan  BMI 45.18  Affecting all facets of life     Hyperlipidemia  Assessment & Plan  C/w statin     Acquired hypothyroidism  Assessment & Plan  TSH at prior hospitalization WNL, continue home dose levothyroxine 50mcg/day           VTE Pharmacologic Prophylaxis: VTE Score: 5 High Risk (Score >/= 5) - Pharmacological DVT Prophylaxis Ordered: warfarin (Coumadin). Sequential Compression Devices Ordered.    Mobility:   Basic Mobility Inpatient Raw Score: 17  JH-HLM Goal: 5: Stand one or more mins  JH-HLM Achieved: 4: Move to chair/commode  JH-HLM Goal NOT achieved. Continue with multidisciplinary rounding and encourage appropriate mobility to improve upon JH-HLM goals.    Patient Centered Rounds: I performed bedside rounds with nursing staff today.   Discussions with Specialists or Other Care Team Provider: will d/w cm     Education and Discussions with Family / Patient: Patient declined call to .     Total Time Spent on Date of Encounter in care of patient: 20 mins. This time was spent on one or more of the following: performing physical exam; counseling and coordination of care; obtaining or reviewing history; documenting in  the medical record; reviewing/ordering tests, medications or procedures; communicating with other healthcare professionals and discussing with patient's family/caregivers.    Current Length of Stay: 3 day(s)  Current Patient Status: Inpatient   Certification Statement: The patient will continue to require additional inpatient hospital stay due to PPM today  Discharge Plan: Anticipate discharge tomorrow to home.    Code Status: Level 3 - DNAR and DNI    Subjective:   Reported to burning in mid chest this morning after getting her pills this morning, pain goes into her throat. She has reflux hx.  No other complaints.     Objective:     Vitals:   Temp (24hrs), Av.1 °F (36.7 °C), Min:97.6 °F (36.4 °C), Max:98.5 °F (36.9 °C)    Temp:  [97.6 °F (36.4 °C)-98.5 °F (36.9 °C)] 98.3 °F (36.8 °C)  HR:  [38-56] 48  Resp:  [15-16] 16  BP: (120-144)/(48-62) 144/62  SpO2:  [84 %-99 %] 98 %  Body mass index is 45.18 kg/m².     Input and Output Summary (last 24 hours):     Intake/Output Summary (Last 24 hours) at 2024 1100  Last data filed at 2024 0932  Gross per 24 hour   Intake 569 ml   Output 1050 ml   Net -481 ml       Physical Exam:   Physical Exam  Vitals reviewed.   Constitutional:       General: She is not in acute distress.     Appearance: She is not toxic-appearing.   HENT:      Head: Normocephalic and atraumatic.   Eyes:      Extraocular Movements: Extraocular movements intact.   Cardiovascular:      Rate and Rhythm: Normal rate and regular rhythm.   Pulmonary:      Effort: Pulmonary effort is normal. No respiratory distress.   Abdominal:      General: Bowel sounds are normal. There is no distension.      Palpations: Abdomen is soft.      Tenderness: There is no abdominal tenderness.   Musculoskeletal:         General: Normal range of motion.   Neurological:      General: No focal deficit present.      Mental Status: She is alert and oriented to person, place, and time.   Psychiatric:         Mood and Affect:  Mood normal.         Behavior: Behavior normal.         Thought Content: Thought content normal.          Additional Data:     Labs:  Results from last 7 days   Lab Units 08/12/24  0453 08/09/24  0542 08/08/24  1705   WBC Thousand/uL 12.34*   < > 11.37*   HEMOGLOBIN g/dL 9.0*   < > 10.2*   HEMATOCRIT % 31.0*   < > 34.0*   PLATELETS Thousands/uL 365   < > 393*   SEGS PCT %  --   --  74   LYMPHO PCT %  --   --  13*   MONO PCT %  --   --  7   EOS PCT %  --   --  5    < > = values in this interval not displayed.     Results from last 7 days   Lab Units 08/12/24  0453   SODIUM mmol/L 139   POTASSIUM mmol/L 4.3   CHLORIDE mmol/L 100   CO2 mmol/L 32   BUN mg/dL 23   CREATININE mg/dL 0.68   ANION GAP mmol/L 7   CALCIUM mg/dL 9.4   GLUCOSE RANDOM mg/dL 102     Results from last 7 days   Lab Units 08/12/24  0453   INR  2.11*     Results from last 7 days   Lab Units 08/06/24  0822   POC GLUCOSE mg/dl 114         Results from last 7 days   Lab Units 08/08/24  0529 08/07/24  0518   PROCALCITONIN ng/ml 0.08 0.12       Lines/Drains:  Invasive Devices       Peripheral Intravenous Line  Duration             Peripheral IV 08/08/24 Right Antecubital 3 days    Peripheral IV 08/11/24 Distal;Left;Ventral (anterior) Forearm <1 day              Drain  Duration             External Urinary Catheter 6 days                      Telemetry:  Telemetry Orders (From admission, onward)               24 Hour Telemetry Monitoring  Continuous x 24 Hours (Telem)        Question:  Reason for 24 Hour Telemetry  Answer:  Arrhythmias requiring acute medical intervention / PPM or ICD malfunction                     Telemetry Reviewed: Sinus Bradycardia  Indication for Continued Telemetry Use: Arrthymias requiring medical therapy             Imaging: No pertinent imaging reviewed.    Recent Cultures (last 7 days):   Results from last 7 days   Lab Units 08/06/24  1917   BLOOD CULTURE  No Growth After 5 Days.       Last 24 Hours Medication List:   Current  Facility-Administered Medications   Medication Dose Route Frequency Provider Last Rate    acetaminophen  650 mg Oral Q6H PRN Zeyad Paez, DO      aspirin  81 mg Oral Daily Zeyad Paez, DO      calcium carbonate  1,000 mg Oral BID PRN Joyce Maurer PA-C      cefazolin  2,000 mg Intravenous Once Roxie Al PA-C      furosemide  20 mg Oral Daily Zeyad Paez, DO      levothyroxine  50 mcg Oral Early Morning Zeyad Paez, DO      magnesium Oxide  400 mg Oral BID Roxie Al PA-C      multivitamin stress formula  1 tablet Oral BID Zeyad Paez, DO      ondansetron  4 mg Intravenous Q6H PRN Zeyad Paez, DO      oxybutynin  5 mg Oral Daily Zeyad Paez, DO      pantoprazole  40 mg Oral BID AC Zeyad Paez, DO      pravastatin  80 mg Oral Daily With Dinner Zeyad Paez, DO      pregabalin  25 mg Oral BID Zeyad Paez, DO      sertraline  100 mg Oral Daily Zeyad Paez, DO      warfarin  2.5 mg Oral Daily (warfarin) Joyce Maurer PA-C          Today, Patient Was Seen By: Joyce Maurer PA-C    **Please Note: This note may have been constructed using a voice recognition system.**

## 2024-08-12 NOTE — ASSESSMENT & PLAN NOTE
Wt Readings from Last 3 Encounters:   08/09/24 85 kg (187 lb 6.3 oz)   08/08/24 85 kg (187 lb 6.3 oz)   08/01/24 80.5 kg (177 lb 7.5 oz)   Previously hospitalized also at [facility] 7/29/2024 - 8/1/2024 for acute exacerbation of this treated with IV diuretics in conjunction with cardiology service  Currently appears euvolemic at this time, continue cardiac medications and monitor volume status closely  C/w lasix 20mg daily   Monitor daily weights

## 2024-08-12 NOTE — ASSESSMENT & PLAN NOTE
Reported with bradycardia at baseline, not able to tolerate rate controlling medications secondary to this  With symptomatic sinus bradycardia with junctional escape beats/junctional rhythm with plan for EP evaluation for PPM as per the primary problem  Continue telemetry monitoring  Anticoagulated on Coumadin, was on hold due to supratherapeutic INR.  Has been on low dose Coumadin over weekend pending PPM  Resume home 2.5 mg starting 8/12 following pacemaker placement.  Monitor INR

## 2024-08-12 NOTE — PLAN OF CARE
Problem: Prexisting or High Potential for Compromised Skin Integrity  Goal: Skin integrity is maintained or improved  Description: INTERVENTIONS:  - Identify patients at risk for skin breakdown  - Assess and monitor skin integrity  - Assess and monitor nutrition and hydration status  - Monitor labs   - Assess for incontinence   - Turn and reposition patient  - Assist with mobility/ambulation  - Relieve pressure over bony prominences  - Avoid friction and shearing  - Provide appropriate hygiene as needed including keeping skin clean and dry  - Evaluate need for skin moisturizer/barrier cream  - Collaborate with interdisciplinary team   - Patient/family teaching  - Consider wound care consult   Outcome: Progressing     Problem: CARDIOVASCULAR - ADULT  Goal: Maintains optimal cardiac output and hemodynamic stability  Description: INTERVENTIONS:  - Monitor I/O, vital signs and rhythm  - Monitor for S/S and trends of decreased cardiac output  - Administer and titrate ordered vasoactive medications to optimize hemodynamic stability  - Assess quality of pulses, skin color and temperature  - Assess for signs of decreased coronary artery perfusion  - Instruct patient to report change in severity of symptoms  Outcome: Progressing  Goal: Absence of cardiac dysrhythmias or at baseline rhythm  Description: INTERVENTIONS:  - Continuous cardiac monitoring, vital signs, obtain 12 lead EKG if ordered  - Administer antiarrhythmic and heart rate control medications as ordered  - Monitor electrolytes and administer replacement therapy as ordered  Outcome: Progressing     Problem: RESPIRATORY - ADULT  Goal: Achieves optimal ventilation and oxygenation  Description: INTERVENTIONS:  - Assess for changes in respiratory status  - Assess for changes in mentation and behavior  - Position to facilitate oxygenation and minimize respiratory effort  - Oxygen administered by appropriate delivery if ordered  - Initiate smoking cessation education  as indicated  - Encourage broncho-pulmonary hygiene including cough, deep breathe, Incentive Spirometry  - Assess the need for suctioning and aspirate as needed  - Assess and instruct to report SOB or any respiratory difficulty  - Respiratory Therapy support as indicated  Outcome: Progressing     Problem: PAIN - ADULT  Goal: Verbalizes/displays adequate comfort level or baseline comfort level  Description: Interventions:  - Encourage patient to monitor pain and request assistance  - Assess pain using appropriate pain scale  - Administer analgesics based on type and severity of pain and evaluate response  - Implement non-pharmacological measures as appropriate and evaluate response  - Consider cultural and social influences on pain and pain management  - Notify physician/advanced practitioner if interventions unsuccessful or patient reports new pain  Outcome: Progressing     Problem: INFECTION - ADULT  Goal: Absence or prevention of progression during hospitalization  Description: INTERVENTIONS:  - Assess and monitor for signs and symptoms of infection  - Monitor lab/diagnostic results  - Monitor all insertion sites, i.e. indwelling lines, tubes, and drains  - Monitor endotracheal if appropriate and nasal secretions for changes in amount and color  - [place] appropriate cooling/warming therapies per order  - Administer medications as ordered  - Instruct and encourage patient and family to use good hand hygiene technique  - Identify and instruct in appropriate isolation precautions for identified infection/condition  Outcome: Progressing  Goal: Absence of fever/infection during neutropenic period  Description: INTERVENTIONS:  - Monitor WBC    Outcome: Progressing     Problem: SAFETY ADULT  Goal: Patient will remain free of falls  Description: INTERVENTIONS:  - Educate patient/family on patient safety including physical limitations  - Instruct patient to call for assistance with activity   - Consult OT/PT to assist  with strengthening/mobility   - Keep Call bell within reach  - Keep bed low and locked with side rails adjusted as appropriate  - Keep care items and personal belongings within reach  - Initiate and maintain comfort rounds  - Make Fall Risk Sign visible to staff  - Offer Toileting every 2 Hours, in advance of need  - Initiate/Maintain 2alarm  - Obtain necessary fall risk management equipment: 2  - Apply yellow socks and bracelet for high fall risk patients  - Consider moving patient to room near nurses station  Outcome: Progressing  Goal: Maintain or return to baseline ADL function  Description: INTERVENTIONS:  -  Assess patient's ability to carry out ADLs; assess patient's baseline for ADL function and identify physical deficits which impact ability to perform ADLs (bathing, care of mouth/teeth, toileting, grooming, dressing, etc.)  - Assess/evaluate cause of self-care deficits   - Assess range of motion  - Assess patient's mobility; develop plan if impaired  - Assess patient's need for assistive devices and provide as appropriate  - Encourage maximum independence but intervene and supervise when necessary  - Involve family in performance of ADLs  - Assess for home care needs following discharge   - Consider OT consult to assist with ADL evaluation and planning for discharge  - Provide patient education as appropriate  Outcome: Progressing  Goal: Maintains/Returns to pre admission functional level  Description: INTERVENTIONS:  - Perform AM-PAC 6 Click Basic Mobility/ Daily Activity assessment daily.  - Set and communicate daily mobility goal to care team and patient/family/caregiver.   - Collaborate with rehabilitation services on mobility goals if consulted  - Perform Range of Motion 2 times a day.  - Reposition patient every 2 hours.  - Dangle patient 2 times a day  - Stand patient 2 times a day  - Ambulate patient 2 times a day  - Out of bed to chair 2 times a day   - Out of bed for meals 2 times a day  - Out of  bed for toileting  - Record patient progress and toleration of activity level   Outcome: Progressing     Problem: DISCHARGE PLANNING  Goal: Discharge to home or other facility with appropriate resources  Description: INTERVENTIONS:  - Identify barriers to discharge w/patient and caregiver  - Arrange for needed discharge resources and transportation as appropriate  - Identify discharge learning needs (meds, wound care, etc.)  - Arrange for interpretive services to assist at discharge as needed  - Refer to Case Management Department for coordinating discharge planning if the patient needs post-hospital services based on physician/advanced practitioner order or complex needs related to functional status, cognitive ability, or social support system  Outcome: Progressing     Problem: Knowledge Deficit  Goal: Patient/family/caregiver demonstrates understanding of disease process, treatment plan, medications, and discharge instructions  Description: Complete learning assessment and assess knowledge base.  Interventions:  - Provide teaching at level of understanding  - Provide teaching via preferred learning methods  Outcome: Progressing

## 2024-08-12 NOTE — ANESTHESIA POSTPROCEDURE EVALUATION
Post-Op Assessment Note    CV Status:  Stable    Pain management: adequate       Mental Status:  Alert and awake   Hydration Status:  Euvolemic   PONV Controlled:  Controlled   Airway Patency:  Patent     Post Op Vitals Reviewed: Yes    No anethesia notable event occurred.    Staff: CRNA               BP   111/67   Temp   97   Pulse  70   Resp   16   SpO2   98%

## 2024-08-12 NOTE — ASSESSMENT & PLAN NOTE
Patient was hospitalized at the [facility] 8/6/2024 - 8/8/2024 after a mechanical fall, no per traumatic injuries noted on evaluation.  Appears short-term rehab was recommended however patient declined this; noted with atrial fibrillation with slow VR not changed from prior. Presented again to that campus's ED while picking up Biotel monitor from cardiology office, reportedly felt dizzy then fell backwards.  Trauma imaging negative for acute traumatic injuries, however Biotel monitor reportedly revealed sinus bradycardia with junctional escape beats and junctional rhythm, noted on ED telemetry HR 40s however intermittently with drops into the HR 30s and rarely HR mid 20s without associated symptoms  Tx to [facility] for EP evaluation for PPM placement. Plan for PPM on Monday   Avoid AV angela blocking agents  Monitor on telemetry  Continue with step down status

## 2024-08-13 ENCOUNTER — APPOINTMENT (INPATIENT)
Dept: RADIOLOGY | Facility: HOSPITAL | Age: 84
DRG: 243 | End: 2024-08-13
Payer: MEDICARE

## 2024-08-13 VITALS
BODY MASS INDEX: 43.37 KG/M2 | RESPIRATION RATE: 16 BRPM | TEMPERATURE: 98.3 F | SYSTOLIC BLOOD PRESSURE: 120 MMHG | OXYGEN SATURATION: 97 % | HEIGHT: 55 IN | WEIGHT: 187.39 LBS | DIASTOLIC BLOOD PRESSURE: 47 MMHG | HEART RATE: 60 BPM

## 2024-08-13 PROBLEM — R07.9 CHEST PAIN: Status: RESOLVED | Noted: 2024-08-12 | Resolved: 2024-08-13

## 2024-08-13 LAB
ANION GAP SERPL CALCULATED.3IONS-SCNC: 6 MMOL/L (ref 4–13)
BASOPHILS # BLD AUTO: 0.05 THOUSANDS/ÂΜL (ref 0–0.1)
BASOPHILS NFR BLD AUTO: 1 % (ref 0–1)
BUN SERPL-MCNC: 20 MG/DL (ref 5–25)
CALCIUM SERPL-MCNC: 9.4 MG/DL (ref 8.4–10.2)
CARDIAC TROPONIN I PNL SERPL HS: 177 NG/L (ref 8–18)
CHLORIDE SERPL-SCNC: 98 MMOL/L (ref 96–108)
CO2 SERPL-SCNC: 35 MMOL/L (ref 21–32)
CREAT SERPL-MCNC: 0.7 MG/DL (ref 0.6–1.3)
EOSINOPHIL # BLD AUTO: 0.79 THOUSAND/ÂΜL (ref 0–0.61)
EOSINOPHIL NFR BLD AUTO: 8 % (ref 0–6)
ERYTHROCYTE [DISTWIDTH] IN BLOOD BY AUTOMATED COUNT: 15.3 % (ref 11.6–15.1)
GFR SERPL CREATININE-BSD FRML MDRD: 79 ML/MIN/1.73SQ M
GLUCOSE SERPL-MCNC: 84 MG/DL (ref 65–140)
HCT VFR BLD AUTO: 29.6 % (ref 34.8–46.1)
HGB BLD-MCNC: 8.8 G/DL (ref 11.5–15.4)
IMM GRANULOCYTES # BLD AUTO: 0.11 THOUSAND/UL (ref 0–0.2)
IMM GRANULOCYTES NFR BLD AUTO: 1 % (ref 0–2)
INR PPP: 2.27 (ref 0.85–1.19)
LYMPHOCYTES # BLD AUTO: 1.54 THOUSANDS/ÂΜL (ref 0.6–4.47)
LYMPHOCYTES NFR BLD AUTO: 16 % (ref 14–44)
MCH RBC QN AUTO: 27.2 PG (ref 26.8–34.3)
MCHC RBC AUTO-ENTMCNC: 29.7 G/DL (ref 31.4–37.4)
MCV RBC AUTO: 92 FL (ref 82–98)
MONOCYTES # BLD AUTO: 0.7 THOUSAND/ÂΜL (ref 0.17–1.22)
MONOCYTES NFR BLD AUTO: 7 % (ref 4–12)
NEUTROPHILS # BLD AUTO: 6.72 THOUSANDS/ÂΜL (ref 1.85–7.62)
NEUTS SEG NFR BLD AUTO: 67 % (ref 43–75)
NRBC BLD AUTO-RTO: 0 /100 WBCS
PLATELET # BLD AUTO: 349 THOUSANDS/UL (ref 149–390)
PMV BLD AUTO: 9.6 FL (ref 8.9–12.7)
POTASSIUM SERPL-SCNC: 4.5 MMOL/L (ref 3.5–5.3)
PROTHROMBIN TIME: 25 SECONDS (ref 12.3–15)
RBC # BLD AUTO: 3.23 MILLION/UL (ref 3.81–5.12)
SODIUM SERPL-SCNC: 139 MMOL/L (ref 135–147)
WBC # BLD AUTO: 9.91 THOUSAND/UL (ref 4.31–10.16)

## 2024-08-13 PROCEDURE — 80048 BASIC METABOLIC PNL TOTAL CA: CPT | Performed by: PHYSICIAN ASSISTANT

## 2024-08-13 PROCEDURE — NC001 PR NO CHARGE: Performed by: INTERNAL MEDICINE

## 2024-08-13 PROCEDURE — 97163 PT EVAL HIGH COMPLEX 45 MIN: CPT

## 2024-08-13 PROCEDURE — 99024 POSTOP FOLLOW-UP VISIT: CPT

## 2024-08-13 PROCEDURE — 85025 COMPLETE CBC W/AUTO DIFF WBC: CPT | Performed by: PHYSICIAN ASSISTANT

## 2024-08-13 PROCEDURE — 97167 OT EVAL HIGH COMPLEX 60 MIN: CPT

## 2024-08-13 PROCEDURE — 71046 X-RAY EXAM CHEST 2 VIEWS: CPT

## 2024-08-13 PROCEDURE — 85610 PROTHROMBIN TIME: CPT | Performed by: PHYSICIAN ASSISTANT

## 2024-08-13 PROCEDURE — 99239 HOSP IP/OBS DSCHRG MGMT >30: CPT | Performed by: INTERNAL MEDICINE

## 2024-08-13 PROCEDURE — 84484 ASSAY OF TROPONIN QUANT: CPT

## 2024-08-13 RX ADMIN — MAGNESIUM OXIDE TAB 400 MG (241.3 MG ELEMENTAL MG) 400 MG: 400 (241.3 MG) TAB at 10:10

## 2024-08-13 RX ADMIN — OXYBUTYNIN 5 MG: 5 TABLET, FILM COATED, EXTENDED RELEASE ORAL at 10:10

## 2024-08-13 RX ADMIN — LEVOTHYROXINE SODIUM 50 MCG: 50 TABLET ORAL at 05:09

## 2024-08-13 RX ADMIN — B-COMPLEX W/ C & FOLIC ACID TAB 1 TABLET: TAB at 10:10

## 2024-08-13 RX ADMIN — SERTRALINE HYDROCHLORIDE 100 MG: 100 TABLET ORAL at 10:10

## 2024-08-13 RX ADMIN — CALCIUM CARBONATE (ANTACID) CHEW TAB 500 MG 1000 MG: 500 CHEW TAB at 10:50

## 2024-08-13 RX ADMIN — PREGABALIN 25 MG: 25 CAPSULE ORAL at 10:10

## 2024-08-13 RX ADMIN — FUROSEMIDE 20 MG: 20 TABLET ORAL at 10:10

## 2024-08-13 RX ADMIN — PANTOPRAZOLE SODIUM 40 MG: 40 TABLET, DELAYED RELEASE ORAL at 05:09

## 2024-08-13 RX ADMIN — ASPIRIN 81 MG: 81 TABLET, COATED ORAL at 10:10

## 2024-08-13 NOTE — DISCHARGE INSTR - AVS FIRST PAGE
Please refer to post pacemaker implantation discharge instructions and restrictions and your pacemaker booklet/temporary card.     Keep incision dry for one week. Leave outer bandage in place for 1 week - it is water proof, and as long as it is fully adhered to your skin you may shower with it.  If it appears as though the bandage is coming off and/or there is any communication to the area of device incision, please then keep the whole area dry for the remaining week.  After 1 week, please remove by pulling all edges away from the center of the bandage. After the bandage is removed, you may then shower normally and get the area wet with soap and water, no scrubbing, and pat dry. Do not use lotions/powders/creams on incision.    No overhead reaching/pushing/pulling/lifting greater than 5-10lbs with left arm for six weeks. Please call the office if you notice redness, swelling, bleeding, or drainage from incision or if you develop fevers.       AFTER PACEMAKER CARE:    If you have any questions, please call [phone] to speak with a nurse (8:30am-4pm, or [phone] after hours). For appointments, please call [phone].      WHAT YOU SHOULD KNOW:   A pacemaker is a small, battery-powered device that is placed under your skin in your upper chest area with wires placed through a vein that lead directly into the heart. It helps regulate your heart rate and prevent your heart from beating too slowly.                 AFTER YOU LEAVE:     Medicines:     Pain medicine:  You may need medicine to take away or decrease pain.     Learn how to take your medicine. Ask what medicine and how much you should take. Be sure you know how, when, and how often to take it. Usually Over the counter pain medicine is sufficient to control pain (Acetominophen or Ibuprofen) Ask your doctor if you may take these. If this does not control your pain, narcotic pain killers may be prescribed, please call if you need prescription.     Do not  wait until the pain is severe before you take your medicine. Tell caregivers if your pain does not decrease.    Pain medicine can make you dizzy or sleepy. Prevent falls by calling someone when you get out of bed or if you need help.        Take your medicine as directed.  Call your healthcare provider if you think your medicine is not helping or if you have side effects. Tell him if you are allergic to any medicine.    Follow up with your cardiologist after your procedure:  You will need a follow-up visit approximately 2 weeks after you leave the hospital. Your cardiologist will check your wound and make sure that your pacemaker is working correctly.     Follow the instructions to check your pacemaker:  Your cardiologist or primary healthcare provider will check your pacemaker and the battery regularly.  He will use a computer to check your pacemaker over the telephone or wireless device which will be given to you.     Pacemaker batteries usually last 8 to 10 years. The pacemaker unit will be replaced when the battery gets low. This is a simpler procedure than the original one to implant your pacemaker.    Wound care:  Keep your incision dry for one week.  Do not use lotions/powders/creams on incision.     Leave outer bandage in place for 1 week - it is water proof, and as long as it is fully adhered to your skin you may shower with it.  If it appears as though the bandage is coming off and/or there is any communication to the area of device incision, please then keep the whole area dry for the remaining week.  After 1 week, please remove by pulling all edges away from the center of the bandage.    Please call the office if you notice redness, swelling, bleeding, or drainage from incision or if you develop fevers.       Activity:   Arm movement and lifting:  Be careful using the arm on the side of your pacemaker. Do not move your arm for the first 24 hours after your procedure. Do not  lift your arm above your  shoulder or lift more than 10 pounds for one month after your procedure. Avoid pushing, pulling, or repetitive arm movements for one month. This helps the leads stay in place and helps your wound heal. Ask your caregiver when you can drive after your procedure. You may move your arm side to side without lifting above your shoulder, and do not need to wear a sling at home.   Driving: you are ok to drive 48 hours after pacemaker is implanted   Sports:  Ask your caregiver when it is okay to play tennis, golf, basketball, or any sport that requires you to lift your arms. Do not play full contact sports, such as football, that could damage your pacemaker. Ask your cardiologist or primary healthcare provider how much and what kinds of physical activity are safe for you.    Living with a pacemaker:   Tell all caregivers you have a pacemaker:  This includes surgeons, radiologists, and medical technicians. You may want to wear a medical alert ID bracelet or necklace that states that you have a pacemaker.    Carry your pacemaker ID card:  Make sure you receive a pacemaker ID card. Carry it with you at all times. It lists important information about your pacemaker. Show it to airport security if you travel.     Avoid electrical interference:  Avoid welding equipment and other equipment with large magnets or electric fields. These things could interfere with how your pacemaker works. Use your cell phone on the ear opposite from your pacemaker. Do not carry your cell phone in your shirt pocket over your chest.     Some Pacemakers are MRI safe. Ask you doctor if it is safe to proceed with MRI and let the radiologist and staff know you have a pacemaker.     Do not touch the skin around your pacemaker:  This can cause damage to the lead wires or move the pacemaker unit from where it should be.    Contact your cardiologist or primary healthcare provider if:   The area around your pacemaker has increasing amount of pain after  surgery. The pain should improve over first few days after implantation.     The skin around your stitches has increasing redness, swelling, or has drainage. This may mean that you have an infection.     You have a fever.     You have chills, a cough, and feel weak or achy. These are also signs of infection.    Your feet or ankles are more swollen than your baseline.     Your Heart rate is less than 50 beats per minute     Seek care immediately if:   Your bandage becomes soaked with blood.     Your pacemaker is swelling rapidly    Your stitches open up.     You feel your heart suddenly beating very slowly or quickly.    You become too weak or dizzy to stand, or you pass out.     Your arm or leg feels warm, tender, and painful. It may look swollen and red.    You have chest pain that does not go away with rest or medicine.     You feel lightheaded, short of breath, and have chest pain.     You cough up blood.        © 2014 [employer]. Information is for End User's use only and may not be sold, redistributed or otherwise used for commercial purposes. All illustrations and images included in CareNotes® are the copyrighted property of [employer] or [employer].  The above information is an  only. It is not intended as medical advice for individual conditions or treatments. Talk to your doctor, nurse or pharmacist before following any medical regimen to see if it is safe and effective for you.

## 2024-08-13 NOTE — ASSESSMENT & PLAN NOTE
Wt Readings from Last 3 Encounters:   08/09/24 85 kg (187 lb 6.3 oz)   08/08/24 85 kg (187 lb 6.3 oz)   08/01/24 80.5 kg (177 lb 7.5 oz)   Previously hospitalized also at [facility] 7/29/2024 - 8/1/2024 for acute exacerbation of this treated with IV diuretics in conjunction with cardiology service  Currently appears euvolemic at this time, continue cardiac medications and monitor volume status closely  C/w lasix 20mg daily   Monitor daily weights

## 2024-08-13 NOTE — ASSESSMENT & PLAN NOTE
Reported with bradycardia at baseline, not able to tolerate rate controlling medications secondary to this  With symptomatic sinus bradycardia with junctional escape beats/junctional rhythm with plan for EP evaluation for PPM as per the primary problem  Continue telemetry monitoring  Anticoagulated on Coumadin, was on hold due to supratherapeutic INR.  Had been on low dose Coumadin over weekend   Resumed home 2.5 mg starting 8/12 following pacemaker placement.  Monitor INR

## 2024-08-13 NOTE — DISCHARGE SUMMARY
[facility]  Discharge- Marisol Otoole 1940, 83 y.o. female MRN: [id number]  Unit/Bed#: MS [facility] Encounter: [id number]  Primary Care Provider: MABEL Hallman   Date and time admitted to hospital: 8/9/2024  1:34 AM    * Junctional bradycardia  Assessment & Plan  Patient was hospitalized at the [facility] 8/6/2024 - 8/8/2024 after a mechanical fall, no per traumatic injuries noted on evaluation.  Appears short-term rehab was recommended however patient declined this; noted with atrial fibrillation with slow VR not changed from prior. Presented again to that [facility] ED while picking up Biotel monitor from cardiology office, reportedly felt dizzy then fell backwards.  Trauma imaging negative for acute traumatic injuries, however Biotel monitor reportedly revealed sinus bradycardia with junctional escape beats and junctional rhythm, noted on ED telemetry HR 40s however intermittently with drops into the HR 30s and rarely HR mid 20s without associated symptoms  Tx to [facility] for EP evaluation. S/p ppm placement on 8/12  Avoid AV angela blocking agents  Monitor on telemetry  PT/OT consulted, recommend home with home therapy     Paroxysmal A-fib (HCC)  Assessment & Plan  Reported with bradycardia at baseline, not able to tolerate rate controlling medications secondary to this  With symptomatic sinus bradycardia with junctional escape beats/junctional rhythm with plan for EP evaluation for PPM as per the primary problem  Continue telemetry monitoring  Anticoagulated on Coumadin, was on hold due to supratherapeutic INR.  Had been on low dose Coumadin over weekend   Resumed home 2.5 mg starting 8/12 following pacemaker placement.  Monitor INR--currently therapeutic     Chest pain-resolved as of 8/13/2024  Assessment & Plan  Pt reported burning mid chest pain radiating to her throat after she got her AM pills on 8/12--Suspect GERD   EKG with no ischemia, troponin x 1 negative initially.  Repeat troponins later in day slightly elevated however trended down and could be related to PPM insertion  TUMS, supportive care     Chronic hypoxemic respiratory failure (HCC)  Assessment & Plan  Chronic, currently on baseline supplemental oxygen requirement  Continue supplemental oxygen, titrate if needed to maintain appropriate oxygenation    Depression with anxiety  Assessment & Plan  Mood stable, continue home dose Zoloft    Chronic heart failure with preserved ejection fraction (HFpEF) (HCC)  Assessment & Plan  Wt Readings from Last 3 Encounters:   08/09/24 85 kg (187 lb 6.3 oz)   08/08/24 85 kg (187 lb 6.3 oz)   08/01/24 80.5 kg (177 lb 7.5 oz)   Previously hospitalized also at [facility] 7/29/2024 - 8/1/2024 for acute exacerbation of this treated with IV diuretics in conjunction with cardiology service  Currently appears euvolemic at this time, continue cardiac medications and monitor volume status closely  C/w lasix 20mg daily   Monitor daily weights     Obesity, morbid (HCC)  Assessment & Plan  BMI 45.18  Affecting all facets of life     Hyperlipidemia  Assessment & Plan  C/w statin     Acquired hypothyroidism  Assessment & Plan  TSH at prior hospitalization WNL, continue home dose levothyroxine 50mcg/day      Medical Problems       Resolved Problems  Date Reviewed: 8/13/2024            Resolved    Chest pain 8/13/2024     Resolved by  Tamiko Treadwell PA-C        Discharging Physician / Practitioner: Tamiko Treadwell PA-C  PCP: MABEL Hallman  Admission Date:   Admission Orders (From admission, onward)       Ordered        08/09/24 0145  INPATIENT ADMISSION  Once                          Discharge Date: 08/13/24    Consultations During Hospital Stay:  EP    Procedures Performed:   PPM Placement 8/12    Significant Findings / Test Results:   See above    Incidental Findings:   See above      Test Results Pending at Discharge (will require follow up):   none     Outpatient Tests Requested:  F/u EP as directed      Complications:  none    Reason for Admission: Junctional bradycardia     Hospital Course:   Marisol Otoole is a 83 y.o. female patient who originally presented to the hospital on 8/9/2024 due to dizziness and fall, found to have junctional bradycardia. Sent to [facility] for PPM which was done on 8/12.     PT/OT cleared for home with home therapy.    Please see above list of diagnoses and related plan for additional information.     Condition at Discharge: stable    Discharge Day Visit / Exam:   * Please refer to separate progress note for these details *    Discussion with Family: Patient declined call to .     Discharge instructions/Information to patient and family:   See after visit summary for information provided to patient and family.      Provisions for Follow-Up Care:  See after visit summary for information related to follow-up care and any pertinent home health orders.      Mobility at time of Discharge:   Basic Mobility Inpatient Raw Score: 17  JH-HLM Goal: 5: Stand one or more mins  JH-HLM Achieved: 6: Walk 10 steps or more  HLM Goal achieved. Continue to encourage appropriate mobility.     Disposition:   Home with VNA Services (Reminder: Complete face to face encounter)    Planned Readmission: no     Discharge Statement:  I spent 34 minutes discharging the patient. This time was spent on the day of discharge. I had direct contact with the patient on the day of discharge. Greater than 50% of the total time was spent examining patient, answering all patient questions, arranging and discussing plan of care with patient as well as directly providing post-discharge instructions.  Additional time then spent on discharge activities.    Discharge Medications:  See after visit summary for reconciled discharge medications provided to patient and/or family.      **Please Note: This note may have been constructed using a voice recognition system**

## 2024-08-13 NOTE — CASE MANAGEMENT
Case Management Assessment & Discharge Planning Note    Patient name Marisol Otoole  Location /-01 MRN [id number]  : 1940 Date 2024       Current Admission Date: 2024  Current Admission Diagnosis:Junctional bradycardia   Patient Active Problem List    Diagnosis Date Noted Date Diagnosed    Paroxysmal A-fib (HCC) 2024     Junctional bradycardia 2024     Chronic anticoagulation 2024     Urinary frequency 2024     At risk for injury related to fall 2024     Walker as ambulation aid 2024     Ambulatory dysfunction 10/24/2023     Fall 2023     ERIN (acute kidney injury) ([facility]) 2023     Leucocytosis 2023     Orbital mass 2023     Stage 3a chronic kidney disease ([facility]) 2022     Radiculopathy, lumbar region 07/10/2021     Chronic hypoxemic respiratory failure ([facility]) 06/10/2021     Depression, recurrent ([facility]) 2021     Osteopenia 10/22/2020     Insomnia 2020     Depression with anxiety 2019     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/2018     Primary osteoarthritis of left shoulder      AVB (atrioventricular block)      Chronic heart failure with preserved ejection fraction (HFpEF) ([facility])      COPD, mild ([facility])      Coronary artery disease involving native coronary artery of native heart without angina pectoris      Gastroesophageal reflux disease without esophagitis 2018     Iron deficiency anemia secondary to inadequate dietary iron intake 2018     Obesity, morbid ([facility]) 2018     Acute diastolic congestive heart failure (HCC) 2018     JANICE (obstructive sleep apnea) 2018     Hyperlipidemia 2017     Primary hypertension 2017     Acquired hypothyroidism 10/29/2017     LVH (left ventricular hypertrophy) 2016     Mitral annular calcification 2016     Mitral valve stenosis 2016     Pulmonary hypertension (HCC) 2016       LOS (days):  4  Geometric Mean LOS (GMLOS) (days): 2.3  Days to GMLOS:-2.1     OBJECTIVE:  PATIENT READMITTED TO HOSPITAL  Risk of Unplanned Readmission Score: 26.22         Current admission status: Inpatient       Preferred Pharmacy:   [facility] [place] - [address]  [address]  [place] [address]  Phone: [phone] Fax: [phone]    Primary Care Provider: MABEL Hallman    Primary Insurance: MEDICARE  Secondary Insurance:     ASSESSMENT:  Active Health Care Proxies       Yuridia Hurtado Health Care Agent - Friend   Primary Phone: [phone] (Mobile)                           Readmission Root Cause  30 Day Readmission: Yes  Who directed you to return to the hospital?: Self  Did you understand whom to contact if you had questions or problems?: Yes  Did you get your prescriptions before you left the hospital?: No  Reason:: Preference for own pharmacy  Were you able to get your prescriptions filled when you left the hospital?: Yes  Did you take your medications as prescribed?: Yes  Were you able to get to your follow-up appointments?: Yes  During previous admission, was a post-acute recommendation made?: Yes  What post-acute resources were offered?: [facility]  Patient was readmitted due to: Fall, found to have dunctional bardy, transferred to [facility]  Action Plan: Pacemaker    Patient Information  Admitted from:: Facility ([facility])  Mental Status: Alert  During Assessment patient was accompanied by: Not accompanied during assessment  Assessment information provided by:: Patient  Primary Caregiver: Self  Support Systems: Self, Friend  County of Residence: [place]  What city do you live in?: [place]  Home entry access options. Select all that apply.: Ramp, No steps to enter home  Type of Current Residence: [place]  Upon entering residence, is there a bedroom on the main floor (no further steps)?: Yes  Upon entering residence, is there a bathroom on the main floor (no  further steps)?: Yes  Living Arrangements: Lives w/ Friend  Is patient a ?: No    Activities of Daily Living Prior to Admission  Functional Status: Independent  Completes ADLs independently?: Yes  Ambulates independently?: Yes  Does patient use assisted devices?: Yes  Assisted Devices (DME) used: Rollator  Does patient currently own DME?: Yes  What DME does the patient currently own?: Rollator, Straight Cane, Home Oxygen concentrator, CPAP  Does patient have a history of Outpatient Therapy (PT/OT)?: No  Does the patient have a history of Short-Term Rehab?: Yes ([place])  Does patient have a history of HHC?: Yes  Does patient currently have HHC?: Yes    Current Home Health Care  Type of Current Home Care Services: Home PT, Home health aide  Current Home Health Agency:: Other (please enter name in comment) ([facility])  Current Home Health Follow-Up Provider:: PCP    Patient Information Continued  Income Source: SSI/SSD  Does patient have prescription coverage?: Yes  Does patient receive dialysis treatments?: No  Does patient have a history of substance abuse?: No  Does patient have a history of Mental Health Diagnosis?: No         Means of Transportation  Means of Transport to Appts:: Friends      Social Determinants of Health (SDOH)      Flowsheet Row Most Recent Value   Housing Stability    In the last 12 months, was there a time when you were not able to pay the mortgage or rent on time? N   In the past 12 months, how many times have you moved where you were living? 0   At any time in the past 12 months, were you homeless or living in a shelter (including now)? N   Transportation Needs    In the past 12 months, has lack of transportation kept you from medical appointments or from getting medications? no   In the past 12 months, has lack of transportation kept you from meetings, work, or from getting things needed for daily living? No   Food Insecurity    Within the past 12 months, you worried that  your food would run out before you got the money to buy more. Never true   Within the past 12 months, the food you bought just didn't last and you didn't have money to get more. Never true   Utilities    In the past 12 months has the electric, gas, oil, or water company threatened to shut off services in your home? No            DISCHARGE DETAILS:    Discharge planning discussed with:: Pt  Freedom of Choice: Yes  Comments - Freedom of Choice: Discussed FOC  CM contacted family/caregiver?: No- see comments (Pt alert and oriented)  Were Treatment Team discharge recommendations reviewed with patient/caregiver?: Yes  Did patient/caregiver verbalize understanding of patient care needs?: Yes  Were patient/caregiver advised of the risks associated with not following Treatment Team discharge recommendations?: Yes    Contacts  Patient Contacts: krissy Damon  Relationship to Patient:: Other (Comment) (Self)  Contact Method: In Person  Reason/Outcome: Continuity of Care, Referral, Discharge Planning    Requested Home Health Care         Is the patient interested in HHC at discharge?: Yes  Home Health Discipline requested:: Occupational Therapy, Nursing, Physical Therapy  [facility] Agency Name:: [facility]  HHA External Referral Reason (only applicable if external HHA name selected): Patient has established relationship with provider  Home Health Follow-Up Provider:: PCP  Home Health Services Needed:: Evaluate Functional Status and Safety, Gait/ADL Training, Oxygen Via Nasal Cannula, Heart Failure Management, Strengthening/Theraputic Exercises to Improve Function, COPD Management  Oxygen LPM Ordered (if applicable based on home health services needed):: 2 LPM  Homebound Criteria Met:: Requires the Assistance of Another Person for Safe Ambulation or to Leave the Home, Uses an Assist Device (i.e. cane, walker, etc)  Supporting Clincal Findings:: Limited Endurance, Requires Oxygen, Fatigues Easliy in Short Distances    DME  Referral Provided  Referral made for DME?: No    Other Referral/Resources/Interventions Provided:  Interventions: HHC  Referral Comments: Pending PT/OT recs. Pt is current with [facility] and is willing to resume upon d/c. She is not agreeable to STR even if recommended.             Additional Comments: Met pt bedside to introduce self and role. Pt resides in a ranch home with ramp entrance. She used to live alone but her friend, [place], recently moved in with her. She is independent of ADLs. She does use a rollator for ambulation. She has other DMEs available. Angeles provides transportation to dr. dupont. Pt denies hx of OPPT, she was receiving HHC via [facility], and was in STR previously ([facility]). She does not wish to pursue STR but is agreeable to HHC if needed. Currently awaiting PT/OT recs. Denies hx of MH diagnosis or substance use. CM office will follow and provide assistance with DCP.     CM reviewed d/c planning process including the following: identifying help at home, patient preference for d/c planning needs, availability of treatment team to discuss questions or concerns patient and/or family may have regarding understanding medications and recognizing signs and symptoms once discharged.  CM also encouraged patient to follow up with all recommended appointments after discharge. Patient advised of importance for patient and family to participate in managing patient’s medical well being.

## 2024-08-13 NOTE — ASSESSMENT & PLAN NOTE
Patient was hospitalized at the [facility] 8/6/2024 - 8/8/2024 after a mechanical fall, no per traumatic injuries noted on evaluation.  Appears short-term rehab was recommended however patient declined this; noted with atrial fibrillation with slow VR not changed from prior. Presented again to that campus's ED while picking up Biotel monitor from cardiology office, reportedly felt dizzy then fell backwards.  Trauma imaging negative for acute traumatic injuries, however Biotel monitor reportedly revealed sinus bradycardia with junctional escape beats and junctional rhythm, noted on ED telemetry HR 40s however intermittently with drops into the HR 30s and rarely HR mid 20s without associated symptoms  Tx to [facility] for EP evaluation. S/p ppm placement on 8/12  Avoid AV angela blocking agents  Monitor on telemetry  PT/OT consulted, recommend home with home therapy

## 2024-08-13 NOTE — ASSESSMENT & PLAN NOTE
Patient was hospitalized at the [facility] 8/6/2024 - 8/8/2024 after a mechanical fall, no per traumatic injuries noted on evaluation.  Appears short-term rehab was recommended however patient declined this; noted with atrial fibrillation with slow VR not changed from prior. Presented again to that campus's ED while picking up Biotel monitor from cardiology office, reportedly felt dizzy then fell backwards.  Trauma imaging negative for acute traumatic injuries, however Biotel monitor reportedly revealed sinus bradycardia with junctional escape beats and junctional rhythm, noted on ED telemetry HR 40s however intermittently with drops into the HR 30s and rarely HR mid 20s without associated symptoms  Tx to [facility] for EP evaluation. S/p ppm placement on 8/12  Avoid AV angela blocking agents  Monitor on telemetry  PT/OT consulted

## 2024-08-13 NOTE — PLAN OF CARE
Problem: OCCUPATIONAL THERAPY ADULT  Goal: Performs self-care activities at highest level of function for planned discharge setting.  See evaluation for individualized goals.  Description: Treatment Interventions: ADL retraining, Functional transfer training, UE strengthening/ROM, Endurance training, Patient/family training, Equipment evaluation/education, Neuromuscular reeducation, Compensatory technique education, Continued evaluation, Cardiac education, Energy conservation, Activityengagement          See flowsheet documentation for full assessment, interventions and recommendations.   Outcome: Progressing  Note: Limitation: Decreased ADL status, Decreased UE ROM, Decreased UE strength, Decreased endurance, Decreased self-care trans, Decreased high-level ADLs  Prognosis: Good  Assessment: Pt is a 82 y/o female that was admitted to [facility] 8/9/2024 with junctional bradycardia. Pt s/p pacemaker placement 8/13, pt with pacemaker precautions. Pt  has a past medical history of Anemia, Anxiety, Arthritis, AVB (atrioventricular block), Cataract, CHF (congestive heart failure) ([facility]), COPD, mild ([facility]), Coronary artery disease, Dislocation of right shoulder joint, Frequent UTI, GERD (gastroesophageal reflux disease), H/O: pneumonia, Heme positive stool, Hyperlipidemia, Hypertension, Hypothyroidism, Morbid obesity with BMI of 50.0-59.9, adult ([facility]), Obesity, morbid (HCC), JANICE on CPAP, Pulmonary hypertension ([facility]), Severe aortic stenosis, Simple goiter, Skin cyst, and Wears glasses. Pt lives with a friend in a one level house with 0 REGINO, ramped entrance, raised toilet, and walk in shower with grab bars and shower chair. Pt reports using rw for functional mobility at baseline. Prior to admission pt (I) ADLs, IADLs, and functional mobility. Pt currently requires supervision to complete grooming, UB bathing, functional transfers, and ambulate short distances with rw. Pt requires MIN A to complete UB dressing, LB  ADLs, and toileting. Pt limited by decreased ADL status, functional transfers, functional mobility, and activity tolerance. Pt seated in bedside chair at begning of session, pt seated in bedside chair at end of session with alarm set and items within reach. The patient's raw score on the -PAC Daily Activity Inpatient Short Form is 19. A raw score of greater than or equal to 19 suggests the patient may benefit from discharge to home. Please refer to the recommendation of the Occupational Therapist for safe discharge planning.  Recommend Level III minimum intensity OT services  at d/c to maximize pt function.     Rehab Resource Intensity Level, OT: III (Minimum Resource Intensity)

## 2024-08-13 NOTE — CASE MANAGEMENT
Case Management Discharge Planning Note    Patient name Marisol Otoole  Location /-01 MRN [id number]  : 1940 Date 2024       Current Admission Date: 2024  Current Admission Diagnosis:Junctional bradycardia   Patient Active Problem List    Diagnosis Date Noted Date Diagnosed    Paroxysmal A-fib (HCC) 2024     Junctional bradycardia 2024     Chronic anticoagulation 2024     Urinary frequency 2024     At risk for injury related to fall 2024     Walker as ambulation aid 2024     Ambulatory dysfunction 10/24/2023     Fall 2023     ERIN (acute kidney injury) ([facility]) 2023     Leucocytosis 2023     Orbital mass 2023     Stage 3a chronic kidney disease ([facility]) 2022     Radiculopathy, lumbar region 07/10/2021     Chronic hypoxemic respiratory failure ([facility]) 06/10/2021     Depression, recurrent ([facility]) 2021     Osteopenia 10/22/2020     Insomnia 2020     Depression with anxiety 2019     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/2018     Primary osteoarthritis of left shoulder      AVB (atrioventricular block)      Chronic heart failure with preserved ejection fraction (HFpEF) ([facility])      COPD, mild ([facility])      Coronary artery disease involving native coronary artery of native heart without angina pectoris      Gastroesophageal reflux disease without esophagitis 2018     Iron deficiency anemia secondary to inadequate dietary iron intake 2018     Obesity, morbid ([facility]) 2018     Acute diastolic congestive heart failure (HCC) 2018     JANICE (obstructive sleep apnea) 2018     Hyperlipidemia 2017     Primary hypertension 2017     Acquired hypothyroidism 10/29/2017     LVH (left ventricular hypertrophy) 2016     Mitral annular calcification 2016     Mitral valve stenosis 2016     Pulmonary hypertension (HCC) 2016       LOS (days): 4  Geometric Mean LOS  (GMLOS) (days): 2.3  Days to GMLOS:-2.3     OBJECTIVE:  Risk of Unplanned Readmission Score: 26.28         Current admission status: Inpatient   Preferred Pharmacy:   [facility] - [address]  [address]  [place] [address]  Phone: [phone] Fax: [phone]    Primary Care Provider: MABEL Hallman    Primary Insurance: MEDICARE  Secondary Insurance:     DISCHARGE DETAILS:    Discharge planning discussed with:: Pt  Freedom of Choice: Yes             Treatment Team Recommendation: Home with Home Health Care  Discharge Destination Plan:: Home with Home Health Care  Transport at Discharge : S Ambulance        Transported by (Company and Unit #): [facility] [employer]  ETA of Transport (Date): 08/13/24  ETA of Transport (Time): 1730              IMM Given (Date):: 08/13/24  IMM Given to:: Patient    IMM reviewed with patient, patient agrees with discharge determination.         Additional Comments: Pt informed nursing staff (Maricel FERNANDEZ) that upon reconsideration, she would like  to set up transportation for her. CM submitted request via [employer], S transport set up for 1730. Nursing and pt aware. Pt signed IMM.

## 2024-08-13 NOTE — ASSESSMENT & PLAN NOTE
Wt Readings from Last 3 Encounters:   08/09/24 85 kg (187 lb 6.3 oz)   08/08/24 85 kg (187 lb 6.3 oz)   08/01/24 80.5 kg (177 lb 7.5 oz)   Previously hospitalized also at [facility] 7/29/2024 - 8/1/2024 for acute exacerbation of this treated with IV diuretics in conjunction with cardiology service  Currently appears euvolemic at this time, continue cardiac medications and monitor volume status closely  C/w lasix 20mg daily   Monitor daily weights

## 2024-08-13 NOTE — ASSESSMENT & PLAN NOTE
Reported with bradycardia at baseline, not able to tolerate rate controlling medications secondary to this  With symptomatic sinus bradycardia with junctional escape beats/junctional rhythm with plan for EP evaluation for PPM as per the primary problem  Continue telemetry monitoring  Anticoagulated on Coumadin, was on hold due to supratherapeutic INR.  Had been on low dose Coumadin over weekend   Resumed home 2.5 mg starting 8/12 following pacemaker placement.  Monitor INR--currently therapeutic

## 2024-08-13 NOTE — PHYSICAL THERAPY NOTE
Physical Therapy Evaluation     Patient's Name: Marisol Otoole    Admitting Diagnosis  Junctional bradycardia [R00.1]    Problem List  Patient Active Problem List   Diagnosis    Acquired hypothyroidism    Primary hypertension    Hyperlipidemia    JANICE (obstructive sleep apnea)    LVH (left ventricular hypertrophy)    Mitral annular calcification    Mitral valve stenosis    Pulmonary hypertension ([facility])    Acute diastolic congestive heart failure (HCC)    Iron deficiency anemia secondary to inadequate dietary iron intake    Obesity, morbid ([facility])    Gastroesophageal reflux disease without esophagitis    Primary osteoarthritis of left shoulder    AVB (atrioventricular block)    Chronic heart failure with preserved ejection fraction (HFpEF) ([facility])    COPD, mild ([facility])    Coronary artery disease involving native coronary artery of native heart without angina pectoris    S/P TAVR (transcatheter aortic valve replacement)    Depression with anxiety    Insomnia    Osteopenia    Depression, recurrent ([facility])    Chronic hypoxemic respiratory failure ([facility])    Radiculopathy, lumbar region    Stage 3a chronic kidney disease ([facility])    Orbital mass    Fall    ERIN (acute kidney injury) ([facility])    Leucocytosis    Ambulatory dysfunction    At risk for injury related to fall    Walker as ambulation aid    Urinary frequency    Junctional bradycardia    Chronic anticoagulation    Paroxysmal A-fib ([facility])       Past Medical History  Past Medical History:   Diagnosis Date    Anemia 08/22/2018    Anxiety     Arthritis     AVB (atrioventricular block)     first degree    Cataract     CHF (congestive heart failure) ([facility])     COPD, mild (HCC)     Coronary artery disease     Dislocation of right shoulder joint     Frequent UTI     GERD (gastroesophageal reflux disease)     H/O: pneumonia     Heme positive stool     Hyperlipidemia     Hypertension     Hypothyroidism     Morbid obesity with BMI of 50.0-59.9, adult ([facility])     Obesity, morbid ([facility])  08/22/2018    JANICE on CPAP     Pulmonary hypertension (HCC) 08/22/2018    Severe aortic stenosis     Simple goiter     Skin cyst     within the armpits, right    Wears glasses        Past Surgical History  Past Surgical History:   Procedure Laterality Date    BREAST BIOPSY      CARDIAC CATHETERIZATION      CARDIAC ELECTROPHYSIOLOGY PROCEDURE N/A 8/12/2024    Procedure: Cardiac pacer implant;  Surgeon: Clarke Zuluaga MD;  Location:  CARDIAC CATH LAB;  Service: Cardiology    CARPAL TUNNEL RELEASE Bilateral     CHOLECYSTECTOMY      DILATION AND CURETTAGE OF UTERUS      HYSTEROSCOPY      MASTOID SURGERY      RI COLONOSCOPY FLX DX W/COLLJ SPEC WHEN PFRMD N/A 9/6/2018    Procedure: COLONOSCOPY;  Surgeon: Shree Sosa III, MD;  Location: [facility];  Service: Gastroenterology    RI ECHO TRANSESOPHAG R-T 2D W/PRB IMG ACQUISJ I&R N/A 10/9/2018    Procedure: INTRA-OP TRANSESOPHAGEAL ECHOCARDIOGRAM (GARRISON);  Surgeon: Kushal Camarena DO;  Location:  MAIN OR;  Service: Cardiac Surgery    RI ESOPHAGOGASTRODUODENOSCOPY TRANSORAL DIAGNOSTIC N/A 8/31/2018    Procedure: ESOPHAGOGASTRODUODENOSCOPY (EGD);  Surgeon: Shree Sosa III, MD;  Location: [facility];  Service: Gastroenterology    RI REPLACE AORTIC VALVE OPENFEMORAL ARTERY APPROACH N/A 10/9/2018    Procedure: REPLACEMENT AORTIC VALVE TRANSCATHETER (TAVR) TRANSFEMORAL W/ 23 MM MENDOZA NOE S3 VALVE (ACCESS OF LEFT);  Surgeon: Kushal Camarena DO;  Location: [facility];  Service: Cardiac Surgery    TOTAL HIP ARTHROPLASTY Left 2007    TOTAL KNEE ARTHROPLASTY Bilateral             08/13/24 1332   PT Last Visit   PT Visit Date 08/13/24   Note Type   Note type Evaluation   Pain Assessment   Pain Assessment Tool 0-10   Pain Score No Pain   Restrictions/Precautions   Braces or Orthoses   (denies)   Other Precautions Chair Alarm;Cardiac/sternal;Multiple lines;Telemetry;O2;Hard of hearing  ((L) UE post PPM placement)   Home Living   Type of Home House   Home Layout One  level;Other (Comment)  (denies REGINO)   Home Equipment Walker;Cane   Additional Comments uses suppl O2 as needed at night at home   Prior Function   Level of [place] Independent with functional mobility  (amb w/ rw (rollator))   Lives With Friend(s)   Receives Help From Friend(s);Home health  (reports she was receiving home PT)   Falls in the last 6 months 1 to 4  (one fall leading to admission)   General   Additional Pertinent History cleared for assessment by amie   Cognition   Overall Cognitive Status WFL   Arousal/Participation Alert   Attention Attends with cues to redirect   Orientation Level Oriented to person;Oriented to place;Oriented to situation   Memory Decreased recall of precautions   Following Commands Follows one step commands with increased time or repetition  ([place])   Subjective   Subjective Alert; in the chair; responds to questions appropriately; agreeable to demonstrate mobility skills   RUE Assessment   RUE Assessment WFL  (AROM)   LUE Assessment   LUE Assessment X  (shld not tested)   RLE Assessment   RLE Assessment WFL  (AROM)   Strength RLE   RLE Overall Strength   (fair +/ good -)   LLE Assessment   LLE Assessment WFL  (AROM)   Strength LLE   LLE Overall Strength   (fair +/ good -)   Transfers   Sit to Stand 5  Supervision   Additional items Verbal cues   Stand to Sit 5  Supervision   Additional items Verbal cues   Ambulation/Elevation   Gait pattern Excessively slow;Short stride;Inconsistent yemi;Shuffling  (no overt LOB, knee buckling or swaying)   Gait Assistance 5  Supervision   Additional items Assist x 1;Verbal cues   Assistive Device Rolling walker   Distance 25 ft; chair ride back to bedside   Ambulation/Elevation Additional Comments initiated amb on RA -> O2 sat down to 87 % after initial 8 ft --> suppl O2 placed back on; RN informed   Balance   Static Sitting Fair +   Dynamic Sitting Fair   Static Standing Fair   Dynamic Standing Fair -   Ambulatory Fair -   Activity Tolerance    Activity Tolerance Patient limited by fatigue   Medical Staff Made Aware Co-eval performed w/ OTR due to complexity of medical status and multiple comorbidities   Nurse Made Aware spoke to RN   Assessment   Prognosis Good   Problem List Decreased strength;Decreased endurance;Impaired balance;Decreased mobility;Obesity   Assessment Pt is 83 y.o. female admitted with hx of fall and Dx of Junctional bradycardia; pt underwent PPM placement on 8/12/2024 . Pt 's comorbidities affecting POC include: Anemia, Anxiety, Arthritis, AVB (atrioventricular block), Cataract, CHF (congestive heart failure) ([facility]), COPD, Coronary artery disease, Dislocation of right shoulder joint, Hypertension, Morbid obesity, JANICE on CPAP, Pulmonary hypertension and Severe aortic stenosis and personal factors of: advanced age. Pt's clinical presentation is currently  unstable/unpredictable which is evident in ongoing telem monitoring, suppl O2 needs, and abn lab values. Pt presents w/ min overall weakness, decreased functional endurance and inconsistent amb balance and gait patterns (already uses rw at home) w/ overall observed mobility status appearing to be near premorbid level. Will cont to follow pt in PT for progressive mobilization to max level of (I), endurance, and safety. D/C recommendations are outlined below. Will cont to follow until then.   Goals   Patient Goals to return home (declines an IP rehab)   STG Expiration Date 08/20/24   Short Term Goal #1 5-7 days. Pt will amb 2 x 50 ft w/ rw, mod (I) in order to facilitate safe return to premorbid environment. Pt will achieve mod (I) level w/ bed mob in order to facilitate safety with OOB and back to bed transitions in own living environment. Pt will perform transfers w/ mod (I) to assure (I) and safety w/ functional mobility/transitions w/ all aspects of mobility/locomotion. Pt will participate in LE therex and balance activities to max progression w/ mobility skills.   PT Treatment Day 0    Plan   Treatment/Interventions Functional transfer training;LE strengthening/ROM;Therapeutic exercise;Endurance training;Bed mobility;Gait training;Spoke to nursing;Spoke to case management;OT   PT Frequency 3-5x/wk   Discharge Recommendation   Rehab Resource Intensity Level, PT III (Minimum Resource Intensity)   Equipment Recommended Walker  (cont using it at home)   AM-PAC Basic Mobility Inpatient   Turning in Flat Bed Without Bedrails 4   Lying on Back to Sitting on Edge of Flat Bed Without Bedrails 3   Moving Bed to Chair 3   Standing Up From Chair Using Arms 3   Walk in Room 3   Climb 3-5 Stairs With Railing 2   Basic Mobility Inpatient Raw Score 18   Basic Mobility Standardized Score 41.05   [facility] Highest Level Of Mobility   -HLM Goal 6: Walk 10 steps or more   JH-HLM Achieved 7: Walk 25 feet or more   Modified [place] Scale   Modified [place] Scale 3   End of Consult   Patient Position at End of Consult Bedside chair;Bed/Chair alarm activated  (OTR remained w/ the pt at the end of session)       Khari Shipman, PT

## 2024-08-13 NOTE — OCCUPATIONAL THERAPY NOTE
Occupational Therapy Evaluation     Patient Name: Marisol Otoole  Today's Date: 8/13/2024  Problem List  Principal Problem:    Junctional bradycardia  Active Problems:    Acquired hypothyroidism    Hyperlipidemia    Obesity, morbid (HCC)    Chronic heart failure with preserved ejection fraction (HFpEF) (HCC)    Depression with anxiety    Chronic hypoxemic respiratory failure (HCC)    Paroxysmal A-fib (HCC)    Past Medical History  Past Medical History:   Diagnosis Date    Anemia 08/22/2018    Anxiety     Arthritis     AVB (atrioventricular block)     first degree    Cataract     CHF (congestive heart failure) (HCC)     COPD, mild (HCC)     Coronary artery disease     Dislocation of right shoulder joint     Frequent UTI     GERD (gastroesophageal reflux disease)     H/O: pneumonia     Heme positive stool     Hyperlipidemia     Hypertension     Hypothyroidism     Morbid obesity with BMI of 50.0-59.9, adult ([facility])     Obesity, morbid (HCC) 08/22/2018    JANICE on CPAP     Pulmonary hypertension ([facility]) 08/22/2018    Severe aortic stenosis     Simple goiter     Skin cyst     within the armpits, right    Wears glasses      Past Surgical History  Past Surgical History:   Procedure Laterality Date    BREAST BIOPSY      CARDIAC CATHETERIZATION      CARDIAC ELECTROPHYSIOLOGY PROCEDURE N/A 8/12/2024    Procedure: Cardiac pacer implant;  Surgeon: Clarke Zuluaga MD;  Location:  CARDIAC CATH LAB;  Service: Cardiology    CARPAL TUNNEL RELEASE Bilateral     CHOLECYSTECTOMY      DILATION AND CURETTAGE OF UTERUS      HYSTEROSCOPY      MASTOID SURGERY      MI COLONOSCOPY FLX DX W/COLLJ SPEC WHEN PFRMD N/A 9/6/2018    Procedure: COLONOSCOPY;  Surgeon: Shree Sosa III, MD;  Location: [facility];  Service: Gastroenterology    MI ECHO TRANSESOPHAG R-T 2D W/PRB IMG ACQUISJ I&R N/A 10/9/2018    Procedure: INTRA-OP TRANSESOPHAGEAL ECHOCARDIOGRAM (GARRISON);  Surgeon: Kushal Camarena DO;  Location:  MAIN OR;  Service: Cardiac Surgery    MI  ESOPHAGOGASTRODUODENOSCOPY TRANSORAL DIAGNOSTIC N/A 8/31/2018    Procedure: ESOPHAGOGASTRODUODENOSCOPY (EGD);  Surgeon: Shree Sosa III, MD;  Location: [facility];  Service: Gastroenterology    CA REPLACE AORTIC VALVE OPENFEMORAL ARTERY APPROACH N/A 10/9/2018    Procedure: REPLACEMENT AORTIC VALVE TRANSCATHETER (TAVR) TRANSFEMORAL W/ 23 MM MENDOZA NOE S3 VALVE (ACCESS OF LEFT);  Surgeon: Kushal Camarena DO;  Location: [facility];  Service: Cardiac Surgery    TOTAL HIP ARTHROPLASTY Left 2007    TOTAL KNEE ARTHROPLASTY Bilateral          08/13/24 1344   OT Last Visit   OT Visit Date 08/13/24   Note Type   Note type Evaluation   Pain Assessment   Pain Assessment Tool 0-10   Pain Score No Pain   Restrictions/Precautions   Weight Bearing Precautions Per Order No   Other Precautions Chair Alarm;Bed Alarm;Telemetry;O2;Fall Risk;Cardiac/sternal  (Pacemaker precuations)   Home Living   Type of Home House   Home Layout One level;Ramped entrance  (0 REGINO)   Bathroom Shower/Tub Walk-in shower   Bathroom Toilet Raised   Bathroom Equipment Grab bars in shower;Shower chair   Home Equipment Walker;Cane  (O2)   Prior Function   Level of [place] Independent with functional mobility;Independent with ADLs;Independent with IADLS   Lives With Friend(s)   Receives Help From Friend(s);Home health  (HHPT and nursing)   IADLs Independent with meal prep;Independent with medication management;Family/Friend/Other provides transportation   Falls in the last 6 months 1 to 4   Vocational Retired   Lifestyle   Autonomy Pt reports (I) with ADls, IADLs, and functional mobility. Pt -  and retired   Reciprocal Relationships friends   Service to Others retired   ADL   Where Assessed Chair   Eating Assistance 5  Supervision/Setup   Grooming Assistance 5  Supervision/Setup   UB Bathing Assistance 5  Supervision/Setup   LB Bathing Assistance 4  Minimal Assistance   UB Dressing Assistance 4  Minimal Assistance   LB Dressing Assistance 4   Minimal Assistance   Toileting Assistance  4  Minimal Assistance   Functional Assistance 4  Minimal Assistance   Bed Mobility   Additional Comments pt OOB upon OT arrival   Transfers   Sit to Stand 5  Supervision   Additional items Increased time required;Verbal cues   Stand to Sit 5  Supervision   Additional items Increased time required;Verbal cues   Additional Comments with rw   Functional Mobility   Functional Mobility 5  Supervision   Additional Comments Pt requies supervision to ambulate short household distance functional with rw   Additional items Rolling walker   Balance   Static Sitting Fair +   Dynamic Sitting Fair +   Static Standing Fair   Dynamic Standing Fair -   Ambulatory Fair -   Activity Tolerance   Activity Tolerance Patient tolerated treatment well   Medical Staff Made Aware PT   Nurse Made Aware RN Cleared   RUE Assessment   RUE Assessment   (Pt shoulder ROM approximatly 100 degrees, pt reports this is baseline)   LUE Assessment   LUE Assessment   (unable to assess due to pacemaker precautions, pt reports decreased ROM at baseline)   Hand Function   Gross Motor Coordination Functional   Fine Motor Coordination Functional   Cognition   Overall Cognitive Status WFL   Arousal/Participation Alert;Responsive;Cooperative   Attention Within functional limits   Orientation Level Oriented X4   Memory Within functional limits   Following Commands Follows one step commands without difficulty   Comments Pt agreeable to OT. Pt educated on pacemaker precuations, demonstrated understanding.   Assessment   Limitation Decreased ADL status;Decreased UE ROM;Decreased UE strength;Decreased endurance;Decreased self-care trans;Decreased high-level ADLs   Prognosis Good   Assessment Pt is a 84 y/o female that was admitted to [facility] 8/9/2024 with junctional bradycardia. Pt s/p pacemaker placement 8/13, pt with pacemaker precautions. Pt  has a past medical history of Anemia, Anxiety, Arthritis, AVB  (atrioventricular block), Cataract, CHF (congestive heart failure) (HCC), COPD, mild (HCC), Coronary artery disease, Dislocation of right shoulder joint, Frequent UTI, GERD (gastroesophageal reflux disease), H/O: pneumonia, Heme positive stool, Hyperlipidemia, Hypertension, Hypothyroidism, Morbid obesity with BMI of 50.0-59.9, adult (HCC), Obesity, morbid (HCC), JANICE on CPAP, Pulmonary hypertension (HCC), Severe aortic stenosis, Simple goiter, Skin cyst, and Wears glasses. Pt lives with a friend in a one level house with 0 REGINO, ramped entrance, raised toilet, and walk in shower with grab bars and shower chair. Pt reports using rw for functional mobility at baseline. Prior to admission pt (I) ADLs, IADLs, and functional mobility. Pt currently requires supervision to complete grooming, UB bathing, functional transfers, and ambulate short distances with rw. Pt requires MIN A to complete UB dressing, LB ADLs, and toileting. Pt limited by decreased ADL status, functional transfers, functional mobility, and activity tolerance. Pt seated in bedside chair at begning of session, pt seated in bedside chair at end of session with alarm set and items within reach. The patient's raw score on the AM-PAC Daily Activity Inpatient Short Form is 19. A raw score of greater than or equal to 19 suggests the patient may benefit from discharge to home. Please refer to the recommendation of the Occupational Therapist for safe discharge planning.  Recommend Level III minimum intensity OT services  at d/c to maximize pt function.   Goals   Patient Goals to go home   LTG Time Frame 10-14   Plan   Treatment Interventions ADL retraining;Functional transfer training;UE strengthening/ROM;Endurance training;Patient/family training;Equipment evaluation/education;Neuromuscular reeducation;Compensatory technique education;Continued evaluation;Cardiac education;Energy conservation;Activityengagement   Goal Expiration Date 08/27/24   OT Frequency  2-3x/wk   Discharge Recommendation   Rehab Resource Intensity Level, OT III (Minimum Resource Intensity)   AM-PAC Daily Activity Inpatient   Lower Body Dressing 3   Bathing 3   Toileting 3   Upper Body Dressing 3   Grooming 3   Eating 4   Daily Activity Raw Score 19   Daily Activity Standardized Score (Calc for Raw Score >=11) 40.22   AM-PAC Applied Cognition Inpatient   Following a Speech/Presentation 4   Understanding Ordinary Conversation 4   Taking Medications 4   Remembering Where Things Are Placed or Put Away 4   Remembering List of 4-5 Errands 3   Taking Care of Complicated Tasks 3   Applied Cognition Raw Score 22   Applied Cognition Standardized Score 47.83   End of Consult   Education Provided Yes  (pt educated on pacemaker precuations)   Patient Position at End of Consult Bedside chair;Bed/Chair alarm activated;All needs within reach   Nurse Communication Nurse aware of consult     Goals:    Pt will complete functional transfers with MOD IND and appropriate AD to maximize pt safety.    Pt will complete bed mobility with MOD IND  to maximize pt safety.    Pt will complete grooming tasks with MOD IND to maximize pt independence.    Pt will complete LB ADLs with MOD IND  to maximize pt independence.    Pt will complete UB ADLs with MOD IND to maximize pt independence.    Pt will complete toileting with MOD IND to maximize pt independence.    Pt will complete functional household distance mobility with MOD IND and appropriate AD to maximize pt safety.    Pt will complete simulated IADL tasks with MOD IND to maximize pt independence.     Pt will be able to tolerate 30 minutes of functional activity during therapy session.    Pt will restate and implement all pacemaker precautions into functional activity to maximize pt safety.      JAMI Pantoja, OTR/L

## 2024-08-13 NOTE — PROGRESS NOTES
[facility]  Progress Note  Name: Marisol Otoole I  MRN: [id number]  Unit/Bed#: -01 I Date of Admission: 8/9/2024   Date of Service: 8/13/2024 I Hospital Day: 4    Assessment & Plan   * Junctional bradycardia  Assessment & Plan  Patient was hospitalized at the [facility] 8/6/2024 - 8/8/2024 after a mechanical fall, no per traumatic injuries noted on evaluation.  Appears short-term rehab was recommended however patient declined this; noted with atrial fibrillation with slow VR not changed from prior. Presented again to that [facility] ED while picking up Biotel monitor from cardiology office, reportedly felt dizzy then fell backwards.  Trauma imaging negative for acute traumatic injuries, however Biotel monitor reportedly revealed sinus bradycardia with junctional escape beats and junctional rhythm, noted on ED telemetry HR 40s however intermittently with drops into the HR 30s and rarely HR mid 20s without associated symptoms  Tx to [facility] for EP evaluation. S/p ppm placement on 8/12  Avoid AV angela blocking agents  Monitor on telemetry  PT/OT consulted    Paroxysmal A-fib (HCC)  Assessment & Plan  Reported with bradycardia at baseline, not able to tolerate rate controlling medications secondary to this  With symptomatic sinus bradycardia with junctional escape beats/junctional rhythm with plan for EP evaluation for PPM as per the primary problem  Continue telemetry monitoring  Anticoagulated on Coumadin, was on hold due to supratherapeutic INR.  Had been on low dose Coumadin over weekend   Resumed home 2.5 mg starting 8/12 following pacemaker placement.  Monitor INR     Chest pain-resolved as of 8/13/2024  Assessment & Plan  Pt reported burning mid chest pain radiating to her throat after she got her AM pills on 8/12--Suspect GERD   EKG with no ischemia, troponin x 1 negative initially. Repeat troponins later in day slightly elevated however trended down and could be related to  PPM insertion  TUMS, supportive care     Chronic hypoxemic respiratory failure (HCC)  Assessment & Plan  Chronic, currently on baseline supplemental oxygen requirement  Continue supplemental oxygen, titrate if needed to maintain appropriate oxygenation    Depression with anxiety  Assessment & Plan  Mood stable, continue home dose Zoloft    Chronic heart failure with preserved ejection fraction (HFpEF) (HCC)  Assessment & Plan  Wt Readings from Last 3 Encounters:   08/09/24 85 kg (187 lb 6.3 oz)   08/08/24 85 kg (187 lb 6.3 oz)   08/01/24 80.5 kg (177 lb 7.5 oz)   Previously hospitalized also at [facility] 7/29/2024 - 8/1/2024 for acute exacerbation of this treated with IV diuretics in conjunction with cardiology service  Currently appears euvolemic at this time, continue cardiac medications and monitor volume status closely  C/w lasix 20mg daily   Monitor daily weights     Obesity, morbid (HCC)  Assessment & Plan  BMI 45.18  Affecting all facets of life     Hyperlipidemia  Assessment & Plan  C/w statin     Acquired hypothyroidism  Assessment & Plan  TSH at prior hospitalization WNL, continue home dose levothyroxine 50mcg/day               VTE Pharmacologic Prophylaxis: VTE Score: 5 Moderate Risk (Score 3-4) - Pharmacological DVT Prophylaxis Ordered: warfarin (Coumadin).    Mobility:   Basic Mobility Inpatient Raw Score: 17  JH-HLM Goal: 5: Stand one or more mins  JH-HLM Achieved: 6: Walk 10 steps or more  JH-HLM Goal achieved. Continue to encourage appropriate mobility.    Patient Centered Rounds: I performed bedside rounds with nursing staff today.   Discussions with Specialists or Other Care Team Provider: will d/w JOS, benito EP     Education and Discussions with Family / Patient: Patient declined call to .     Total Time Spent on Date of Encounter in care of patient: 35 mins. This time was spent on one or more of the following: performing physical exam; counseling and coordination of care;  obtaining or reviewing history; documenting in the medical record; reviewing/ordering tests, medications or procedures; communicating with other healthcare professionals and discussing with patient's family/caregivers.    Current Length of Stay: 4 day(s)  Current Patient Status: Inpatient   Certification Statement: The patient will continue to require additional inpatient hospital stay due to pending final EP clearance and PT/OT evaluations   Discharge Plan: Anticipate discharge later today or tomorrow to discharge location to be determined pending rehab evaluations.    Code Status: Level 3 - DNAR and DNI    Subjective:   Pt seen this AM sitting in chair after returning from xray. Feels well. Denies any chest pain or SOB. She would like to go home later. Discussed the possibility of rehab, she declines however is agreeable to formal PT/OT evaluations.    Objective:     Vitals:   Temp (24hrs), Av.2 °F (36.8 °C), Min:97.7 °F (36.5 °C), Max:98.4 °F (36.9 °C)    Temp:  [97.7 °F (36.5 °C)-98.4 °F (36.9 °C)] 98.4 °F (36.9 °C)  HR:  [38-60] 60  Resp:  [16-18] 16  BP: ()/(46-62) 121/56  SpO2:  [80 %-99 %] 95 %  Body mass index is 45.18 kg/m².     Input and Output Summary (last 24 hours):     Intake/Output Summary (Last 24 hours) at 2024 0814  Last data filed at 2024 0715  Gross per 24 hour   Intake 970 ml   Output 1530 ml   Net -560 ml       Physical Exam:   Physical Exam  Vitals and nursing note reviewed.   Constitutional:       Comments: On RA   Cardiovascular:      Rate and Rhythm: Normal rate.      Comments: L sided chest dressing noted   Pulmonary:      Effort: No respiratory distress.   Abdominal:      General: Bowel sounds are normal. There is distension.      Palpations: Abdomen is soft.   Musculoskeletal:      Right lower leg: No edema.      Left lower leg: No edema.   Neurological:      Mental Status: She is alert and oriented to person, place, and time.   Psychiatric:         Mood and Affect:  Mood normal.          Additional Data:     Labs:  Results from last 7 days   Lab Units 08/13/24  0507   WBC Thousand/uL 9.91   HEMOGLOBIN g/dL 8.8*   HEMATOCRIT % 29.6*   PLATELETS Thousands/uL 349   SEGS PCT % 67   LYMPHO PCT % 16   MONO PCT % 7   EOS PCT % 8*     Results from last 7 days   Lab Units 08/13/24  0507   SODIUM mmol/L 139   POTASSIUM mmol/L 4.5   CHLORIDE mmol/L 98   CO2 mmol/L 35*   BUN mg/dL 20   CREATININE mg/dL 0.70   ANION GAP mmol/L 6   CALCIUM mg/dL 9.4   GLUCOSE RANDOM mg/dL 84     Results from last 7 days   Lab Units 08/13/24  0507   INR  2.27*     Results from last 7 days   Lab Units 08/06/24  0822   POC GLUCOSE mg/dl 114         Results from last 7 days   Lab Units 08/08/24  0529 08/07/24  0518   PROCALCITONIN ng/ml 0.08 0.12       Lines/Drains:  Invasive Devices       Peripheral Intravenous Line  Duration             Peripheral IV 08/08/24 Right Antecubital 4 days    Peripheral IV 08/11/24 Distal;Left;Ventral (anterior) Forearm 1 day              Drain  Duration             External Urinary Catheter 6 days                      Telemetry:  Telemetry Orders (From admission, onward)               24 Hour Telemetry Monitoring  Continuous x 24 Hours (Telem)        Question:  Reason for 24 Hour Telemetry  Answer:  PCI/EP study (including pacer and ICD implementation), Cardiac surgery, MI, abnormal cardiac cath, and chest pain- rule out MI                     Telemetry Reviewed: Normal Sinus Rhythm  Indication for Continued Telemetry Use: Arrthymias requiring medical therapy             Imaging: No pertinent imaging reviewed.    Recent Cultures (last 7 days):   Results from last 7 days   Lab Units 08/06/24  1917   BLOOD CULTURE  No Growth After 5 Days.       Last 24 Hours Medication List:   Current Facility-Administered Medications   Medication Dose Route Frequency Provider Last Rate    acetaminophen  650 mg Oral Q4H PRN Joyce Maurer PA-C      aspirin  81 mg Oral Daily Joyce Maurer PA-C       calcium carbonate  1,000 mg Oral BID PRN Joyce Maurer PA-C      furosemide  20 mg Oral Daily Joyce Maurer PA-C      levothyroxine  50 mcg Oral Early Morning Joyce Maurer PA-C      magnesium Oxide  400 mg Oral BID Joyce Maurer PA-C      multivitamin stress formula  1 tablet Oral BID Joyce Maurer PA-C      ondansetron  4 mg Intravenous Q6H PRN Joyce Maurer PA-C      oxybutynin  5 mg Oral Daily Joyce Maurer PA-C      pantoprazole  40 mg Oral BID AC Joyce Maurer PA-C      pravastatin  80 mg Oral Daily With Dinner Joyce Maurer PA-C      pregabalin  25 mg Oral BID Joyce Maurer PA-C      sertraline  100 mg Oral Daily Joyce Maurer PA-C      warfarin  2.5 mg Oral Daily (warfarin) Joyce Maurer PA-C          Today, Patient Was Seen By: Tamiko Treadwell PA-C    **Please Note: This note may have been constructed using a voice recognition system.**

## 2024-08-13 NOTE — PLAN OF CARE
Problem: PHYSICAL THERAPY ADULT  Goal: Performs mobility at highest level of function for planned discharge setting.  See evaluation for individualized goals.  Description: Treatment/Interventions: Functional transfer training, LE strengthening/ROM, Therapeutic exercise, Endurance training, Bed mobility, Gait training, Spoke to nursing, Spoke to case management, OT  Equipment Recommended: Walker (cont using it at home)       See flowsheet documentation for full assessment, interventions and recommendations.  Note: Prognosis: Good  Problem List: Decreased strength, Decreased endurance, Impaired balance, Decreased mobility, Obesity  Assessment: Pt is 83 y.o. female admitted with hx of fall and Dx of Junctional bradycardia; pt underwent PPM placement on 8/12/2024 . Pt 's comorbidities affecting POC include: Anemia, Anxiety, Arthritis, AVB (atrioventricular block), Cataract, CHF (congestive heart failure) (HCC), COPD, Coronary artery disease, Dislocation of right shoulder joint, Hypertension, Morbid obesity, JANICE on CPAP, Pulmonary hypertension and Severe aortic stenosis and personal factors of: advanced age. Pt's clinical presentation is currently  unstable/unpredictable which is evident in ongoing telem monitoring, suppl O2 needs, and abn lab values. Pt presents w/ min overall weakness, decreased functional endurance and inconsistent amb balance and gait patterns (already uses rw at home) w/ overall observed mobility status appearing to be near premorbid level. Will cont to follow pt in PT for progressive mobilization to max level of (I), endurance, and safety. D/C recommendations are outlined below. Will cont to follow until then.        Rehab Resource Intensity Level, PT: III (Minimum Resource Intensity)    See flowsheet documentation for full assessment.

## 2024-08-13 NOTE — PROGRESS NOTES
Progress Note - Electrophysiology  Marisol Otoole 83 y.o. female MRN: [id number]  Unit/Bed#: -01 Encounter: [id number]      Assessment:  SSS s/p Medtronic dual chamber PPM (08/13/24) by Dr. Zuluaga    Was experiencing symptomatic bradycardia w/ rate in the 30s' and pauses lasting up to 4.5 seconds  Did experience syncope, falls, fatigue, and lightheadedness  Now s/p Medtronic DC PPM   Device check this AM shows appropriate device functioning   CXR's post-implantation show appropriate device positioning and lead placement   Patient reports her pain is well controlled at this time  PAF  Not on outpatient rate/rhythm control  [id number] - Maintained on coumadin (INR 2.27 this AM)  CHF  HLD  Obesity  Hypothyroidism    Plan:  1. Device - Patient is doing well status post Medtronic dual chamber pacemaker. Her incision is clean, dry, and intact without evidence of hematoma or ecchymosis or signs of infection. Vital signs and labs were reviewed. Assessment of chest x-rays show proper lead placement without evidence of pneumothorax. Device interrogation showed appropriate device function with lead parameters such as sensing, threshold, and impedance being tested.     2. Post care - Discharge instructions and restrictions were discussed with the patient in detail. She will also be provided with written instructions detailing what was discussed. Patient also requires a 2 week device and site check which has already been arranged and is in Epic.     3. Medications - In terms of medications, no medication adjustments are required from an EP standpoint. Patient is on goal directed medical therapy of ASA and statin.    4. Patient is stable from an EP standpoint for discharge at this time. We will sign off; please reach out with further question or concern.    Subjective/Objective   Subjective: Marisol Otoole is a 83 y.o. year old female with a PMH of PAF, chronic respiratory failure, CHF, obesity, HLD, and hypothyroidism. She  "is hospital stay day 7 and is status post Medtronic dual chamber pacemaker insertion on 08/12/24.     She reports no issues overnight, denies chest pain, shortness of breath, palpitations, lightheadedness or dizziness.     Telemetry shows Atrial paced rhythm     Objective:  Vitals: /56 (BP Location: Right leg)   Pulse 60   Temp 98.4 °F (36.9 °C) (Oral)   Resp 16   Ht 4' 6\" (1.372 m)   Wt 85 kg (187 lb 6.3 oz)   SpO2 95%   BMI 45.18 kg/m²     Vitals:    08/09/24 0141   Weight: 85 kg (187 lb 6.3 oz)     Orthostatic Blood Pressures      Flowsheet Row Most Recent Value   Blood Pressure 121/56 filed at 08/13/2024 0715   Patient Position - Orthostatic VS Sitting filed at 08/13/2024 0715              Intake/Output Summary (Last 24 hours) at 8/13/2024 0905  Last data filed at 8/13/2024 0715  Gross per 24 hour   Intake 970 ml   Output 1330 ml   Net -360 ml       Invasive Devices       Peripheral Intravenous Line  Duration             Peripheral IV 08/08/24 Right Antecubital 4 days    Peripheral IV 08/11/24 Distal;Left;Ventral (anterior) Forearm 1 day              Drain  Duration             External Urinary Catheter 6 days                              Scheduled Meds:  Current Facility-Administered Medications   Medication Dose Route Frequency Provider Last Rate    acetaminophen  650 mg Oral Q4H PRN Joyce JAKE Maurer-C      aspirin  81 mg Oral Daily Joycemindy Maurer PA-C      calcium carbonate  1,000 mg Oral BID PRN Joyce Erasto, PA-C      furosemide  20 mg Oral Daily JoyceJAKE Paul-C      levothyroxine  50 mcg Oral Early Morning Joyce Erasto PA-C      magnesium Oxide  400 mg Oral BID Joyce Erasto PA-C      multivitamin stress formula  1 tablet Oral BID JoyceJAKE Padilla-C      ondansetron  4 mg Intravenous Q6H PRN JoyceJAKE Paul-C      oxybutynin  5 mg Oral Daily Joyce Erasto PA-C      pantoprazole  40 mg Oral BID AC JAKE Aranda-C      pravastatin  80 mg Oral Daily With Dinner Joyce Maurer PA-C      " pregabalin  25 mg Oral BID Joyce Maurer PA-C      sertraline  100 mg Oral Daily Joyce Maurer PA-C      warfarin  2.5 mg Oral Daily (warfarin) Joyce Maurer PA-C       Continuous Infusions:   PRN Meds:.  acetaminophen    calcium carbonate    ondansetron    Review of Systems:  ROS as noted above, otherwise 12 point review of systems was performed and is negative.     Physical Exam:   GEN: NAD, alert and oriented, well appearing  SKIN: dry without significant lesions or rashes; pacemaker insertion site is clean, dry, without active bleeding or discharge, without excessive swelling/ecchymosis, and without pain out of proportion to exam  HEENT: NCAT, PERRL, EOMs intact  NECK: No JVD or carotid bruits appreciated  CARDIOVASCULAR: RRR, normal S1, S2 without murmurs, rubs, or gallops appreciated  LUNGS: Clear to auscultation bilaterally without wheezes, rhonchi, or rales  ABDOMEN: Soft, nontender, nondistended  EXTREMITIES/VASCULAR: perfused without clubbing, cyanosis, or edema b/l  PSYCH: Normal mood and affect  NEURO: CN ll-Xll grossly intact              Lab Results: I have personally reviewed pertinent lab results.    Results from last 7 days   Lab Units 08/13/24  0507 08/12/24  0453 08/10/24  0515   WBC Thousand/uL 9.91 12.34* 10.10   HEMOGLOBIN g/dL 8.8* 9.0* 9.0*   HEMATOCRIT % 29.6* 31.0* 30.1*   PLATELETS Thousands/uL 349 365 378     Results from last 7 days   Lab Units 08/13/24  0507 08/12/24  0453 08/10/24  0515   POTASSIUM mmol/L 4.5 4.3 4.3   CHLORIDE mmol/L 98 100 103   CO2 mmol/L 35* 32 32   BUN mg/dL 20 23 25   CREATININE mg/dL 0.70 0.68 0.68   CALCIUM mg/dL 9.4 9.4 9.8     Results from last 7 days   Lab Units 08/13/24  0507 08/12/24  0453 08/11/24  0516   INR  2.27* 2.11* 2.31*     Results from last 7 days   Lab Units 08/09/24  0542 08/08/24  1705 08/08/24  0529   MAGNESIUM mg/dL 1.6* 1.9 2.1       Imaging: I have personally reviewed pertinent reports.    Results for orders placed during the hospital  encounter of 19    Echo complete with contrast if indicated    [facility]  [address], [place] [address] [phone]    Transthoracic Echocardiogram  Limited 2D, M-mode, Doppler, and Color Doppler    Study date:  16-Aug-2019    Patient: MAURISIO ELENA  MR number: [id number]  Account number: [id number]  : 1940  Age: 78 years  Gender: Female  Status: Outpatient  Location: [facility]  Height: 55 in  Weight: 214.9 lb  BP: 127/ 59 mmHg    Indications: Aortic valve disorder.    Diagnoses: I35.0 - Nonrheumatic aortic (valve) stenosis    Sonographer:  ADELA Ricks  Primary Physician:  MABEL Gray  Referring Physician:  MABEL Shin  Group:  [facility]  Interpreting Physician:  Bro Esteves MD    SUMMARY    PROCEDURE INFORMATION:  This was a technically difficult study.  Echocardiographic views were limited due to restricted patient mobility and decreased penetration.  Intravenous contrast ( 1.2 ml Definity in NSS., 4 ml) was administered to opacify the left ventricle.    LEFT VENTRICLE:  Systolic function was normal. Ejection fraction was estimated to be 60 %.  There were no regional wall motion abnormalities.  Wall thickness was mildly increased.  Doppler parameters were consistent with abnormal left ventricular relaxation (grade 1 diastolic dysfunction).    RIGHT VENTRICLE:  The size was normal.  Systolic function was normal.    MITRAL VALVE:  There was moderate annular calcification.  There was mild stenosis.    AORTIC VALVE:  A bioprosthesis (#23 Cornelius-Haresh TAVR) was present. It was not visualized well. It seemed to be well seated with no regurgitation. Peak and mean velocities (gradients) were 2.93 (34) and 2.18 (21) m/sec (mm Hg), respectively. The  calculated aortic valve area was 1.3 cm2 using the continuity equation.    TRICUSPID VALVE:  There was mild regurgitation.  Estimated peak  PA pressure was 56 mmHg.    HISTORY: PRIOR HISTORY: TAVR ( 23mm Cornelius Haresh). Heart failure. COPD CAD. Risk factors: hypertension, hypercholesterolemia, and morbid obesity.    PROCEDURE: The study was performed in the [facility]. This was a routine study. The transthoracic approach was used. The study included limited 2D imaging, M-mode, complete spectral Doppler, and color Doppler. The  heart rate was 60 bpm, at the start of the study. Images were obtained from the parasternal, apical, subcostal, and suprasternal notch acoustic windows. Intravenous contrast ( 1.2 ml Definity in NSS., 4 ml) was administered to opacify the  left ventricle. Echocardiographic views were limited due to restricted patient mobility and decreased penetration. This was a technically difficult study.    LEFT VENTRICLE: Size was normal. Systolic function was normal. Ejection fraction was estimated to be 60 %. There were no regional wall motion abnormalities. Wall thickness was mildly increased. No evidence of apical thrombus. DOPPLER:  Doppler parameters were consistent with abnormal left ventricular relaxation (grade 1 diastolic dysfunction).    RIGHT VENTRICLE: The size was normal. Systolic function was normal. Wall thickness was normal.    LEFT ATRIUM: Size was normal.    RIGHT ATRIUM: Size was normal.    MITRAL VALVE: There was moderate annular calcification. There was normal leaflet separation. DOPPLER: There was mild stenosis. There was no regurgitation.    AORTIC VALVE: A bioprosthesis (#23 Cornelius-Haresh TAVR) was present. It was not visualized well. It seemed to be well seated with no regurgitation. Peak and mean velocities (gradients) were 2.93 (34) and 2.18 (21) m/sec (mm Hg),  respectively. The calculated aortic valve area was 1.3 cm2 using the continuity equation. DOPPLER: Transaortic velocity was within the normal range. There was no evidence for stenosis. There was no significant  regurgitation.    TRICUSPID VALVE: The valve structure was normal. There was normal leaflet separation. DOPPLER: There was no evidence for stenosis. There was mild regurgitation. Estimated peak PA pressure was 56 mmHg.    PULMONIC VALVE: Leaflets exhibited normal thickness, no calcification, and normal cuspal separation. DOPPLER: The transpulmonic velocity was within the normal range. There was no significant regurgitation.    PERICARDIUM: There was no pericardial effusion. The pericardium was normal in appearance.    AORTA: The root exhibited normal size.    SYSTEMIC VEINS: IVC: The inferior vena cava was normal in size. Respirophasic changes were normal.    SYSTEM MEASUREMENT TABLES    2D  LVOT Diam: 1.98 cm    CW  AV Env.Ti: 361.56 ms  AV MaxP.73 mmHg  AV VTI: 74.32 cm  AV Vmax: 2.9 m/s  AV Vmean: 2.06 m/s  AV meanP.59 mmHg  TR MaxP.3 mmHg  TR Vmax: 3.65 m/s    MM  TAPSE: 2.43 cm    PW  VASU (VTI): 1.32 cm2  VASU Vmax: 1.27 cm2  E': 0.07 m/s  E/E': 18.92  HR: 110.24 BPM  LVOT Env.Ti: 372.98 ms  LVOT VTI: 31.98 cm  LVOT Vmax: 1.2 m/s  LVOT Vmean: 0.86 m/s  LVOT maxP.8 mmHg  LVOT meanPG: 3.25 mmHg  LVSV Dopp: 98 ml  MV A Jose: 1.67 m/s  MV Dec [place]: 3.48 m/s2  MV DecT: 358.15 ms  MV E Jose: 1.25 m/s  MV E/A Ratio: 0.75  MV PHT: 103.86 ms  MV VTI: 46.66 cm  MV Vmax: 1.59 m/s  MV Vmean: 0.86 m/s  MV maxPG: 10.16 mmHg  MV meanPG: 3.68 mmHg  MVA (VTI): 2.1 cm2  MVA By PHT: 2.12 cm2    [employer] Accredited Echocardiography Laboratory    Prepared and electronically signed by    Bro Esteves MD  Signed 16-Aug-2019 15:11:32

## 2024-08-13 NOTE — PLAN OF CARE
Problem: Prexisting or High Potential for Compromised Skin Integrity  Goal: Skin integrity is maintained or improved  Description: INTERVENTIONS:  - Identify patients at risk for skin breakdown  - Assess and monitor skin integrity  - Assess and monitor nutrition and hydration status  - Monitor labs   - Assess for incontinence   - Turn and reposition patient  - Assist with mobility/ambulation  - Relieve pressure over bony prominences  - Avoid friction and shearing  - Provide appropriate hygiene as needed including keeping skin clean and dry  - Evaluate need for skin moisturizer/barrier cream  - Collaborate with interdisciplinary team   - Patient/family teaching  - Consider wound care consult   8/13/2024 1513 by Maricel Domínguez RN  Outcome: Adequate for Discharge     Problem: CARDIOVASCULAR - ADULT  Goal: Maintains optimal cardiac output and hemodynamic stability  Description: INTERVENTIONS:  - Monitor I/O, vital signs and rhythm  - Monitor for S/S and trends of decreased cardiac output  - Administer and titrate ordered vasoactive medications to optimize hemodynamic stability  - Assess quality of pulses, skin color and temperature  - Assess for signs of decreased coronary artery perfusion  - Instruct patient to report change in severity of symptoms  8/13/2024 1513 by Maricel Domínguez RN  Outcome: Adequate for Discharge     Problem: CARDIOVASCULAR - ADULT  Goal: Absence of cardiac dysrhythmias or at baseline rhythm  Description: INTERVENTIONS:  - Continuous cardiac monitoring, vital signs, obtain 12 lead EKG if ordered  - Administer antiarrhythmic and heart rate control medications as ordered  - Monitor electrolytes and administer replacement therapy as ordered  8/13/2024 1513 by Maricel Domínguez RN  Outcome: Adequate for Discharge     Problem: RESPIRATORY - ADULT  Goal: Achieves optimal ventilation and oxygenation  Description: INTERVENTIONS:  - Assess for changes in respiratory status  - Assess for changes in  mentation and behavior  - Position to facilitate oxygenation and minimize respiratory effort  - Oxygen administered by appropriate delivery if ordered  - Initiate smoking cessation education as indicated  - Encourage broncho-pulmonary hygiene including cough, deep breathe, Incentive Spirometry  - Assess the need for suctioning and aspirate as needed  - Assess and instruct to report SOB or any respiratory difficulty  - Respiratory Therapy support as indicated  8/13/2024 1513 by Maricel Domínguez RN  Outcome: Adequate for Discharge     Problem: PAIN - ADULT  Goal: Verbalizes/displays adequate comfort level or baseline comfort level  Description: Interventions:  - Encourage patient to monitor pain and request assistance  - Assess pain using appropriate pain scale  - Administer analgesics based on type and severity of pain and evaluate response  - Implement non-pharmacological measures as appropriate and evaluate response  - Consider cultural and social influences on pain and pain management  - Notify physician/advanced practitioner if interventions unsuccessful or patient reports new pain  8/13/2024 1513 by Maricel Domínguez RN  Outcome: Adequate for Discharge     Problem: INFECTION - ADULT  Goal: Absence or prevention of progression during hospitalization  Description: INTERVENTIONS:  - Assess and monitor for signs and symptoms of infection  - Monitor lab/diagnostic results  - Monitor all insertion sites, i.e. indwelling lines, tubes, and drains  - Monitor endotracheal if appropriate and nasal secretions for changes in amount and color  - Bern appropriate cooling/warming therapies per order  - Administer medications as ordered  - Instruct and encourage patient and family to use good hand hygiene technique  - Identify and instruct in appropriate isolation precautions for identified infection/condition  8/13/2024 1513 by Maricel Domínguez RN  Outcome: Adequate for Discharge     Problem: INFECTION - ADULT  Goal: Absence  of fever/infection during neutropenic period  Description: INTERVENTIONS:  - Monitor WBC    8/13/2024 1513 by Maricel Domínguez RN  Outcome: Adequate for Discharge     Problem: SAFETY ADULT  Goal: Patient will remain free of falls  Description: INTERVENTIONS:  - Educate patient/family on patient safety including physical limitations  - Instruct patient to call for assistance with activity   - Consult OT/PT to assist with strengthening/mobility   - Keep Call bell within reach  - Keep bed low and locked with side rails adjusted as appropriate  - Keep care items and personal belongings within reach  - Initiate and maintain comfort rounds  - Make Fall Risk Sign visible to staff  - Offer Toileting every  Hours, in advance of need  - Initiate/Maintain alarm  - Obtain necessary fall risk management equipment:   - Apply yellow socks and bracelet for high fall risk patients  - Consider moving patient to room near nurses station  8/13/2024 1513 by Maricel Domínguez RN  Outcome: Adequate for Discharge     Problem: SAFETY ADULT  Goal: Maintain or return to baseline ADL function  Description: INTERVENTIONS:  -  Assess patient's ability to carry out ADLs; assess patient's baseline for ADL function and identify physical deficits which impact ability to perform ADLs (bathing, care of mouth/teeth, toileting, grooming, dressing, etc.)  - Assess/evaluate cause of self-care deficits   - Assess range of motion  - Assess patient's mobility; develop plan if impaired  - Assess patient's need for assistive devices and provide as appropriate  - Encourage maximum independence but intervene and supervise when necessary  - Involve family in performance of ADLs  - Assess for home care needs following discharge   - Consider OT consult to assist with ADL evaluation and planning for discharge  - Provide patient education as appropriate  8/13/2024 1513 by Maricel Domínguez RN  Outcome: Adequate for Discharge     Problem: SAFETY ADULT  Goal:  Maintains/Returns to pre admission functional level  Description: INTERVENTIONS:  - Perform AM-PAC 6 Click Basic Mobility/ Daily Activity assessment daily.  - Set and communicate daily mobility goal to care team and patient/family/caregiver.   - Collaborate with rehabilitation services on mobility goals if consulted  - Perform Range of Motion  times a day.  - Reposition patient every  hours.  - Dangle patient  times a day  - Stand patient  times a day  - Ambulate patient  times a day  - Out of bed to chair  times a day   - Out of bed for meals  times a day  - Out of bed for toileting  - Record patient progress and toleration of activity level   8/13/2024 1513 by Maricel Domínguez RN  Outcome: Adequate for Discharge     Problem: DISCHARGE PLANNING  Goal: Discharge to home or other facility with appropriate resources  Description: INTERVENTIONS:  - Identify barriers to discharge w/patient and caregiver  - Arrange for needed discharge resources and transportation as appropriate  - Identify discharge learning needs (meds, wound care, etc.)  - Arrange for interpretive services to assist at discharge as needed  - Refer to Case Management Department for coordinating discharge planning if the patient needs post-hospital services based on physician/advanced practitioner order or complex needs related to functional status, cognitive ability, or social support system  8/13/2024 1513 by Maricel Domínguez RN  Outcome: Adequate for Discharge     Problem: Knowledge Deficit  Goal: Patient/family/caregiver demonstrates understanding of disease process, treatment plan, medications, and discharge instructions  Description: Complete learning assessment and assess knowledge base.  Interventions:  - Provide teaching at level of understanding  - Provide teaching via preferred learning methods  8/13/2024 1513 by Maricel Domínguez RN  Outcome: Adequate for Discharge

## 2024-08-13 NOTE — ASSESSMENT & PLAN NOTE
Pt reported burning mid chest pain radiating to her throat after she got her AM pills on 8/12--Suspect GERD   EKG with no ischemia, troponin x 1 negative initially. Repeat troponins later in day slightly elevated however trended down and could be related to PPM insertion  TUMS, supportive care

## 2024-08-14 ENCOUNTER — TELEPHONE (OUTPATIENT)
Age: 84
End: 2024-08-14

## 2024-08-14 ENCOUNTER — TRANSITIONAL CARE MANAGEMENT (OUTPATIENT)
Dept: FAMILY MEDICINE CLINIC | Facility: CLINIC | Age: 84
End: 2024-08-14

## 2024-08-14 ENCOUNTER — ANTICOAG VISIT (OUTPATIENT)
Dept: CARDIOLOGY CLINIC | Facility: CLINIC | Age: 84
End: 2024-08-14

## 2024-08-14 LAB — INR PPP: 2 (ref 0.85–1.19)

## 2024-08-14 NOTE — TELEPHONE ENCOUNTER
Cristin, pt's visiting nurse called. Pt was wearing an Mcot monitor prior to hospitalization and pacemaker implant.  Cristin is asking if pt should return monitor or if she should continue to wear it for remainder of how long it was ordered for. Please advise.    Coumadin clinic- Cristin checked pt's INR and it was 2.0 today.

## 2024-08-14 NOTE — TELEPHONE ENCOUNTER
Dr. Esteves's pt. Out of the office.    Please advise if pt should still wear event monitor since she recently got a pacemaker implanted.    Thanks!

## 2024-08-14 NOTE — TELEPHONE ENCOUNTER
S/w Cristin and pt. Advised pt can stop wearing the event monitor and stay on the same dose of warfarin, retest in 1 week.

## 2024-08-16 ENCOUNTER — NURSE TRIAGE (OUTPATIENT)
Age: 84
End: 2024-08-16

## 2024-08-16 NOTE — TELEPHONE ENCOUNTER
"Reason for Disposition   MILD longstanding difficulty breathing (e.g., speaks in phrases, SOB even at rest, pulse 100-120) and SAME as normal    Answer Assessment - Initial Assessment Questions  1. RESPIRATORY STATUS: \"Describe your breathing?\" (e.g., wheezing, shortness of breath, unable to speak, severe coughing)       Shortness of breath with exertion  2. ONSET: \"When did this breathing problem begin?\"       Ongoing for a while  3. PATTERN \"Does the difficult breathing come and go, or has it been constant since it started?\"       Comes and goes   4. SEVERITY: \"How bad is your breathing?\" (e.g., mild, moderate, severe)     - MILD: No SOB at rest, mild SOB with walking, speaks normally in sentences, can lay down, no retractions, pulse < 100.     - MODERATE: SOB at rest, SOB with minimal exertion and prefers to sit, cannot lie down flat, speaks in phrases, mild retractions, audible wheezing, pulse 100-120.     - SEVERE: Very SOB at rest, speaks in single words, struggling to breathe, sitting hunched forward, retractions, pulse > 120       Mild   5. RECURRENT SYMPTOM: \"Have you had difficulty breathing before?\" If Yes, ask: \"When was the last time?\" and \"What happened that time?\"       Today   6. CARDIAC HISTORY: \"Do you have any history of heart disease?\" (e.g., heart attack, angina, bypass surgery, angioplasty)       Recent pacemaker implant   7. LUNG HISTORY: \"Do you have any history of lung disease?\"  (e.g., pulmonary embolus, asthma, emphysema)      Unsure   8. CAUSE: \"What do you think is causing the breathing problem?\"       Unsure   9. OTHER SYMPTOMS: \"Do you have any other symptoms? (e.g., dizziness, runny nose, cough, chest pain, fever)      Fatigue   11. TRAVEL: \"Have you traveled out of the country in the last month?\" (e.g., travel history, exposures)        Denies    Protocols used: Breathing Difficulty-ADULT-[place]    " Skin Substitute Units (Will Override Primary Defect Units If Greater Than 0): 0

## 2024-08-16 NOTE — TELEPHONE ENCOUNTER
Chief Complaint: concerned with vitals    History of Present Illness (HPI): around 1 pm she checked BP and it was 118/70, pulse 90. She checked on her pulse ox machine and O2 sat was 85%, pulse 77.  She is wearing 2L of oxygen. I had patient recheck, O2 sat is now 95%.  Pt states she always is short of breath, with exertion.  Denies worsening.  She states today she has had some GALINDO, but says it is not bad.  Denies chest pain.     VS/Weight: 118/70 pulse 77. 95% on 2L    Pain: No    Risk Factors: Heart Failure and Arrhythmia    Recent Testing: Other recent cardiac pacer implant 8/12    Medicine:  no BP meds    Upcoming Office Visit: Yes; 8/26 Fercho Briones PA-C    Last Office Visit: 3/8/24    Additional Comments: advised pt if worsening symptoms to go to ED for evaluation

## 2024-08-19 ENCOUNTER — TELEPHONE (OUTPATIENT)
Age: 84
End: 2024-08-19

## 2024-08-19 NOTE — TELEPHONE ENCOUNTER
Adrienne from [facility] called.pt was seen today for pt evaluation order through the hosp.pt need additional visits will send plan of care order to be sign by pcp.

## 2024-08-20 ENCOUNTER — LAB REQUISITION (OUTPATIENT)
Dept: LAB | Facility: HOSPITAL | Age: 84
End: 2024-08-20
Payer: MEDICARE

## 2024-08-20 ENCOUNTER — ANTICOAG VISIT (OUTPATIENT)
Dept: CARDIOLOGY CLINIC | Facility: CLINIC | Age: 84
End: 2024-08-20

## 2024-08-20 DIAGNOSIS — I10 ESSENTIAL (PRIMARY) HYPERTENSION: ICD-10-CM

## 2024-08-20 DIAGNOSIS — I50.33 ACUTE ON CHRONIC DIASTOLIC (CONGESTIVE) HEART FAILURE (HCC): ICD-10-CM

## 2024-08-20 DIAGNOSIS — I25.10 ATHEROSCLEROTIC HEART DISEASE OF NATIVE CORONARY ARTERY WITHOUT ANGINA PECTORIS: ICD-10-CM

## 2024-08-20 LAB
ALBUMIN SERPL BCG-MCNC: 3.5 G/DL (ref 3.5–5)
ALP SERPL-CCNC: 89 U/L (ref 34–104)
ALT SERPL W P-5'-P-CCNC: 18 U/L (ref 7–52)
ANION GAP SERPL CALCULATED.3IONS-SCNC: 9 MMOL/L (ref 4–13)
AST SERPL W P-5'-P-CCNC: 23 U/L (ref 13–39)
BASOPHILS # BLD AUTO: 0.06 THOUSANDS/ÂΜL (ref 0–0.1)
BASOPHILS NFR BLD AUTO: 1 % (ref 0–1)
BILIRUB SERPL-MCNC: 0.45 MG/DL (ref 0.2–1)
BUN SERPL-MCNC: 21 MG/DL (ref 5–25)
CALCIUM SERPL-MCNC: 9.7 MG/DL (ref 8.4–10.2)
CHLORIDE SERPL-SCNC: 102 MMOL/L (ref 96–108)
CO2 SERPL-SCNC: 28 MMOL/L (ref 21–32)
CREAT SERPL-MCNC: 0.73 MG/DL (ref 0.6–1.3)
EOSINOPHIL # BLD AUTO: 0.85 THOUSAND/ÂΜL (ref 0–0.61)
EOSINOPHIL NFR BLD AUTO: 8 % (ref 0–6)
ERYTHROCYTE [DISTWIDTH] IN BLOOD BY AUTOMATED COUNT: 16.9 % (ref 11.6–15.1)
GFR SERPL CREATININE-BSD FRML MDRD: 75 ML/MIN/1.73SQ M
GLUCOSE SERPL-MCNC: 83 MG/DL (ref 65–140)
HCT VFR BLD AUTO: 30.2 % (ref 34.8–46.1)
HGB BLD-MCNC: 9.3 G/DL (ref 11.5–15.4)
IMM GRANULOCYTES # BLD AUTO: 0.08 THOUSAND/UL (ref 0–0.2)
IMM GRANULOCYTES NFR BLD AUTO: 1 % (ref 0–2)
INR PPP: 2.1 (ref 0.85–1.19)
LYMPHOCYTES # BLD AUTO: 1.47 THOUSANDS/ÂΜL (ref 0.6–4.47)
LYMPHOCYTES NFR BLD AUTO: 13 % (ref 14–44)
MCH RBC QN AUTO: 27.4 PG (ref 26.8–34.3)
MCHC RBC AUTO-ENTMCNC: 30.8 G/DL (ref 31.4–37.4)
MCV RBC AUTO: 89 FL (ref 82–98)
MONOCYTES # BLD AUTO: 0.76 THOUSAND/ÂΜL (ref 0.17–1.22)
MONOCYTES NFR BLD AUTO: 7 % (ref 4–12)
NEUTROPHILS # BLD AUTO: 7.71 THOUSANDS/ÂΜL (ref 1.85–7.62)
NEUTS SEG NFR BLD AUTO: 70 % (ref 43–75)
NRBC BLD AUTO-RTO: 0 /100 WBCS
PLATELET # BLD AUTO: 457 THOUSANDS/UL (ref 149–390)
PMV BLD AUTO: 9.3 FL (ref 8.9–12.7)
POTASSIUM SERPL-SCNC: 4.7 MMOL/L (ref 3.5–5.3)
PROT SERPL-MCNC: 6.4 G/DL (ref 6.4–8.4)
RBC # BLD AUTO: 3.4 MILLION/UL (ref 3.81–5.12)
SODIUM SERPL-SCNC: 139 MMOL/L (ref 135–147)
WBC # BLD AUTO: 10.93 THOUSAND/UL (ref 4.31–10.16)

## 2024-08-20 PROCEDURE — 80053 COMPREHEN METABOLIC PANEL: CPT | Performed by: NURSE PRACTITIONER

## 2024-08-20 PROCEDURE — 85025 COMPLETE CBC W/AUTO DIFF WBC: CPT | Performed by: NURSE PRACTITIONER

## 2024-08-20 NOTE — TELEPHONE ENCOUNTER
Continue to monitor at home. Patient should go to the ED if symptoms worsen. Otherwise, further evaluation at upcoming office visit.

## 2024-08-21 ENCOUNTER — TELEPHONE (OUTPATIENT)
Dept: CARDIOLOGY CLINIC | Facility: CLINIC | Age: 84
End: 2024-08-21

## 2024-08-21 DIAGNOSIS — G62.9 NEUROPATHY: ICD-10-CM

## 2024-08-21 DIAGNOSIS — M51.36 DDD (DEGENERATIVE DISC DISEASE), LUMBAR: ICD-10-CM

## 2024-08-21 NOTE — TELEPHONE ENCOUNTER
Reason for call:   [x] Refill   [] Prior Auth  [] Other:     Office:   [] PCP/Provider -   [x] Specialty/Provider -     Medication: pregabalin (LYRICA) 25 mg capsule     Dose/Frequency: Take 1 capsule (25 mg total) by mouth daily at bedtime for 3 days, THEN 1 capsule (25 mg total) 2 (two) times a day for 27 days.     Quantity: 57 capsule    Pharmacy: [facility] - [facility]  [address] [address] [phone]    Does the patient have enough for 3 days?   [] Yes   [x] No - Send as HP to POD

## 2024-08-21 NOTE — TELEPHONE ENCOUNTER
Rcieved fax from [facility].    Requesting  signature for INR report results.    Scanned into chart and placed in  folder

## 2024-08-22 ENCOUNTER — OFFICE VISIT (OUTPATIENT)
Dept: FAMILY MEDICINE CLINIC | Facility: CLINIC | Age: 84
End: 2024-08-22
Payer: MEDICARE

## 2024-08-22 VITALS
HEIGHT: 55 IN | OXYGEN SATURATION: 91 % | WEIGHT: 182 LBS | SYSTOLIC BLOOD PRESSURE: 110 MMHG | BODY MASS INDEX: 42.12 KG/M2 | HEART RATE: 59 BPM | DIASTOLIC BLOOD PRESSURE: 70 MMHG

## 2024-08-22 DIAGNOSIS — F33.9 DEPRESSION, RECURRENT (HCC): ICD-10-CM

## 2024-08-22 DIAGNOSIS — Z76.89 ENCOUNTER FOR SUPPORT AND COORDINATION OF TRANSITION OF CARE: Primary | ICD-10-CM

## 2024-08-22 DIAGNOSIS — J44.9 COPD, MILD (HCC): ICD-10-CM

## 2024-08-22 DIAGNOSIS — H65.192 ACUTE EFFUSION OF LEFT EAR: ICD-10-CM

## 2024-08-22 DIAGNOSIS — E03.9 ACQUIRED HYPOTHYROIDISM: ICD-10-CM

## 2024-08-22 DIAGNOSIS — D50.8 IRON DEFICIENCY ANEMIA SECONDARY TO INADEQUATE DIETARY IRON INTAKE: ICD-10-CM

## 2024-08-22 DIAGNOSIS — I27.20 PULMONARY HYPERTENSION (HCC): Chronic | ICD-10-CM

## 2024-08-22 DIAGNOSIS — I50.31 ACUTE DIASTOLIC CONGESTIVE HEART FAILURE (HCC): ICD-10-CM

## 2024-08-22 DIAGNOSIS — K21.9 GASTROESOPHAGEAL REFLUX DISEASE WITHOUT ESOPHAGITIS: ICD-10-CM

## 2024-08-22 DIAGNOSIS — R00.1 JUNCTIONAL BRADYCARDIA: ICD-10-CM

## 2024-08-22 DIAGNOSIS — I10 PRIMARY HYPERTENSION: ICD-10-CM

## 2024-08-22 DIAGNOSIS — R26.2 AMBULATORY DYSFUNCTION: ICD-10-CM

## 2024-08-22 DIAGNOSIS — Z95.0 PRESENCE OF PERMANENT CARDIAC PACEMAKER: ICD-10-CM

## 2024-08-22 PROBLEM — N17.9 AKI (ACUTE KIDNEY INJURY) (HCC): Status: RESOLVED | Noted: 2023-08-14 | Resolved: 2024-08-22

## 2024-08-22 PROBLEM — F41.8 DEPRESSION WITH ANXIETY: Status: RESOLVED | Noted: 2019-03-08 | Resolved: 2024-08-22

## 2024-08-22 PROBLEM — N18.31 STAGE 3A CHRONIC KIDNEY DISEASE (HCC): Status: RESOLVED | Noted: 2022-03-14 | Resolved: 2024-08-22

## 2024-08-22 PROCEDURE — 99495 TRANSJ CARE MGMT MOD F2F 14D: CPT | Performed by: NURSE PRACTITIONER

## 2024-08-22 RX ORDER — PREGABALIN 25 MG/1
25 CAPSULE ORAL 2 TIMES DAILY
Qty: 60 CAPSULE | Refills: 1 | Status: SHIPPED | OUTPATIENT
Start: 2024-08-22

## 2024-08-22 RX ORDER — FLUTICASONE PROPIONATE 50 MCG
1 SPRAY, SUSPENSION (ML) NASAL DAILY
Qty: 16 G | Refills: 1 | Status: SHIPPED | OUTPATIENT
Start: 2024-08-22

## 2024-08-22 NOTE — ASSESSMENT & PLAN NOTE
Wt Readings from Last 3 Encounters:   08/22/24 82.6 kg (182 lb)   08/09/24 85 kg (187 lb 6.3 oz)   08/08/24 85 kg (187 lb 6.3 oz)         Continue care with cardiology.  Continue on Lasix daily.

## 2024-08-22 NOTE — TELEPHONE ENCOUNTER
Received 2nd sheet through fax from [facility].     Signature Pending Regarding pt INR.     Scanned into chart and placed into  folder.

## 2024-08-22 NOTE — TELEPHONE ENCOUNTER
Caller: Marisol    Doctor: Winston    Reason for call: patient reports no side effects Lyrica helping legs     Thank you

## 2024-08-22 NOTE — TELEPHONE ENCOUNTER
LMOM to Cb, CB# provided    Left detailed message regarding how she is doing on medication?  Is it effective?  Any issues?

## 2024-08-22 NOTE — ASSESSMENT & PLAN NOTE
Patient's antihypertensives were discontinued.  Blood pressure is very well-controlled.  Continue to monitor blood pressure at home and let us know if it is consistently greater than 130/90.

## 2024-08-22 NOTE — PROGRESS NOTES
Assessment/Plan:     Chronic Problems:  Primary hypertension  Patient's antihypertensives were discontinued.  Blood pressure is very well-controlled.  Continue to monitor blood pressure at home and let us know if it is consistently greater than 130/90.    Acute diastolic congestive heart failure (HCC)  Wt Readings from Last 3 Encounters:   08/22/24 82.6 kg (182 lb)   08/09/24 85 kg (187 lb 6.3 oz)   08/08/24 85 kg (187 lb 6.3 oz)         Continue care with cardiology.  Continue on Lasix daily.      Junctional bradycardia  S/p ppm    COPD, mild (HCC)  Advised to follow-up with pulmonology.    Gastroesophageal reflux disease without esophagitis  Continues on omeprazole daily.    Acquired hypothyroidism  Levothyroxine.    Depression, recurrent (HCC)  Doing well with Zoloft daily.    Iron deficiency anemia secondary to inadequate dietary iron intake  Continue daily iron we will check labs prior to next visit.    Ambulatory dysfunction  Continue care with physical      Visit Diagnosis:  Diagnoses and all orders for this visit:    Encounter for support and coordination of transition of care    Acute effusion of left ear  -     fluticasone (FLONASE) 50 mcg/act nasal spray; 1 spray into each nostril daily    Pulmonary hypertension (HCC)  -     Ambulatory Referral to Pulmonology; Future    Iron deficiency anemia secondary to inadequate dietary iron intake  -     CBC and differential; Future  -     Iron Panel (Includes Ferritin, Iron Sat%, Iron, and TIBC); Future    Primary hypertension  -     CBC and differential; Future  -     Comprehensive metabolic panel; Future  -     Iron Panel (Includes Ferritin, Iron Sat%, Iron, and TIBC); Future    Acute diastolic congestive heart failure (HCC)    Junctional bradycardia    COPD, mild (HCC)    Gastroesophageal reflux disease without esophagitis    Acquired hypothyroidism    Depression, recurrent (HCC)    Ambulatory dysfunction    Presence of permanent cardiac  pacemaker        Subjective:     Patient ID: Marisol Otoole is a 83 y.o. female.    Patient presents for TCM visit.  Patient was hospitalized from August 6 to the eighth status post fall.  She then unfortunately suffered another fall when she was checking out at the cardiologist office on August 8 and sent to the ER.  She subsequently was transferred to [place] on August 9 for permanent pacemaker.  She was discharged on August 13 to home.  She is feeling well, no falls, lightheadedness or dizziness.  She does have frequent headaches and ear pressure in her left ear.  She has not seen her ENT since December, but will be seeing him in September.  She is concerned with right ankle pain and swelling.  She did have an x-ray in the hospital which was negative.        Active Problems    Patient Active Problem List   Diagnosis    Acquired hypothyroidism    Primary hypertension    Hyperlipidemia    JANICE (obstructive sleep apnea)    LVH (left ventricular hypertrophy)    Mitral annular calcification    Mitral valve stenosis    Pulmonary hypertension (HCC)    Acute diastolic congestive heart failure (HCC)    Iron deficiency anemia secondary to inadequate dietary iron intake    Obesity, morbid (HCC)    Gastroesophageal reflux disease without esophagitis    Primary osteoarthritis of left shoulder    AVB (atrioventricular block)    Chronic heart failure with preserved ejection fraction (HFpEF) (HCC)    COPD, mild (HCC)    Coronary artery disease involving native coronary artery of native heart without angina pectoris    S/P TAVR (transcatheter aortic valve replacement)    Insomnia    Osteopenia    Depression, recurrent (HCC)    Chronic hypoxemic respiratory failure (HCC)    Radiculopathy, lumbar region    Orbital mass    Fall    Leucocytosis    Ambulatory dysfunction    At risk for injury related to fall    Walker as ambulation aid    Urinary frequency    Junctional bradycardia    Chronic anticoagulation    Paroxysmal A-fib ([facility])     Presence of permanent cardiac pacemaker       Past Medical History     Past Medical History:   Diagnosis Date    Anemia 08/22/2018    Anxiety     Arthritis     AVB (atrioventricular block)     first degree    Cataract     CHF (congestive heart failure) (HCC)     COPD, mild (HCC)     Coronary artery disease     Dislocation of right shoulder joint     Frequent UTI     GERD (gastroesophageal reflux disease)     H/O: pneumonia     Heme positive stool     Hyperlipidemia     Hypertension     Hypothyroidism     Morbid obesity with BMI of 50.0-59.9, adult (HCC)     Obesity, morbid (HCC) 08/22/2018    JANICE on CPAP     Pulmonary hypertension ([facility]) 08/22/2018    Severe aortic stenosis     Simple goiter     Skin cyst     within the armpits, right    Wears glasses        Surgical History    Past Surgical History:   Procedure Laterality Date    BREAST BIOPSY      CARDIAC CATHETERIZATION      CARDIAC ELECTROPHYSIOLOGY PROCEDURE N/A 8/12/2024    Procedure: Cardiac pacer implant;  Surgeon: Clarke Zuluaga MD;  Location: [facility];  Service: Cardiology    CARPAL TUNNEL RELEASE Bilateral     CHOLECYSTECTOMY      DILATION AND CURETTAGE OF UTERUS      HYSTEROSCOPY      MASTOID SURGERY      LA COLONOSCOPY FLX DX W/COLLJ SPEC WHEN PFRMD N/A 9/6/2018    Procedure: COLONOSCOPY;  Surgeon: Shree Sosa III, MD;  Location: [facility];  Service: Gastroenterology    LA ECHO TRANSESOPHAG R-T 2D W/PRB IMG ACQUISJ I&R N/A 10/9/2018    Procedure: INTRA-OP TRANSESOPHAGEAL ECHOCARDIOGRAM (GARRISON);  Surgeon: Kushal Camarena DO;  Location:  MAIN OR;  Service: Cardiac Surgery    LA ESOPHAGOGASTRODUODENOSCOPY TRANSORAL DIAGNOSTIC N/A 8/31/2018    Procedure: ESOPHAGOGASTRODUODENOSCOPY (EGD);  Surgeon: Shree Sosa III, MD;  Location: [facility];  Service: Gastroenterology    LA REPLACE AORTIC VALVE OPENFEMORAL ARTERY APPROACH N/A 10/9/2018    Procedure: REPLACEMENT AORTIC VALVE TRANSCATHETER (TAVR) TRANSFEMORAL W/ 23 MM MENDOZA NOE S3  VALVE (ACCESS OF LEFT);  Surgeon: Kushal Camarena DO;  Location: [facility];  Service: Cardiac Surgery    TOTAL HIP ARTHROPLASTY Left 2007    TOTAL KNEE ARTHROPLASTY Bilateral        Current Meds       Current Outpatient Medications:     acetaminophen (TYLENOL) 500 mg tablet, Take 500 mg by mouth every 6 (six) hours as needed, Disp: , Rfl:     aspirin (ECOTRIN LOW STRENGTH) 81 mg EC tablet, Take 1 tablet (81 mg total) by mouth daily, Disp: 100 tablet, Rfl: 0    b complex vitamins capsule, Take 1 capsule by mouth 2 (two) times a day  , Disp: , Rfl:     Blood Glucose Monitoring Suppl (OneTouch Verio Reflect) w/Device KIT, Check blood sugars once daily. Please substitute with appropriate alternative as covered by patient's insurance. Dx: E11.65, Disp: 1 kit, Rfl: 0    Calcium Carb-Cholecalciferol (CALCIUM 600 + D PO), Take 1 tablet by mouth 2 (two) times a day, Disp: , Rfl:     Cranberry 250 MG TABS, Take by mouth, Disp: , Rfl:     ferrous sulfate 324 (65 Fe) mg, Take 1 tablet every other day., Disp: 90 tablet, Rfl: 1    fluticasone (FLONASE) 50 mcg/act nasal spray, 1 spray into each nostril daily, Disp: 16 g, Rfl: 1    furosemide (LASIX) 20 mg tablet, Take 1 tablet (20 mg total) by mouth daily for 7 days, Disp: 7 tablet, Rfl: 0    glucose blood (OneTouch Verio) test strip, Check blood sugars once daily. Please substitute with appropriate alternative as covered by patient's insurance. Dx: E11.65, Disp: 100 each, Rfl: 3    glucose blood test strip, Use 1 each in the morning Use as instructed, Disp: 100 each, Rfl: 4    hydrocortisone 2.5 % cream, Apply topically 2 (two) times a day, Disp: 28 g, Rfl: 1    Lancets (onetouch ultrasoft) lancets, Use in the morning Use as instructed, Disp: 100 each, Rfl: 1    levothyroxine 50 mcg tablet, TAKE 1 TABLET BY MOUTH EVERY DAY, Disp: 100 tablet, Rfl: 1    ofloxacin (OCUFLOX) 0.3 % ophthalmic solution, 5 drops twice daily to left ear x 10 days., Disp: 10 mL, Rfl: 1    omeprazole  (PriLOSEC) 40 MG capsule, TAKE 1 CAPSULE BY MOUTH TWICE A DAY, Disp: 180 capsule, Rfl: 1    OneTouch Delica Lancets 33G MISC, Check blood sugars once daily. Please substitute with appropriate alternative as covered by patient's insurance. Dx: E11.65, Disp: 100 each, Rfl: 3    oxybutynin (DITROPAN-XL) 5 mg 24 hr tablet, Take 1 tablet (5 mg total) by mouth daily, Disp: 90 tablet, Rfl: 1    oxygen gas, Inhale 2 L/min continuous. Indications: copd, Disp: , Rfl:     pregabalin (LYRICA) 25 mg capsule, Take 1 capsule (25 mg total) by mouth daily at bedtime for 3 days, THEN 1 capsule (25 mg total) 2 (two) times a day for 27 days., Disp: 57 capsule, Rfl: 0    sertraline (ZOLOFT) 100 mg tablet, TAKE 1 TABLET BY MOUTH EVERY DAY, Disp: 90 tablet, Rfl: 1    simvastatin (ZOCOR) 40 mg tablet, TAKE 1 TABLET BY MOUTH EVERYDAY AT BEDTIME, Disp: 100 tablet, Rfl: 1    triamcinolone (KENALOG) 0.1 % cream, Apply topically 2 (two) times a day, Disp: 15 g, Rfl: 1    Turmeric (QC Tumeric Complex) 500 MG CAPS, Take by mouth, Disp: , Rfl:     warfarin (Coumadin) 2.5 mg tablet, Take 1 tablet (2.5mg) Mon-Sat. Take 2 tablets (5mg) on Sun or as directed Do not start before August 9, 2024., Disp: 102 tablet, Rfl: 0    Allergies    Allergies   Allergen Reactions    Latex Rash    Neosporin [Neomycin-Bacitracin Zn-Polymyx] Rash and Other (See Comments)     hives per PACC order       No images are attached to the encounter.    Health Management    Health Maintenance   Topic Date Due    Zoster Vaccine (1 of 2) Never done    RSV Vaccine Age 60+ Years (1 - 1-dose 60+ series) Never done    Falls: Plan of Care  06/10/2022    COVID-19 Vaccine (3 - 2023-24 season) 09/01/2023    Influenza Vaccine (1) 09/01/2024    Fall Risk  10/24/2024    Medicare Annual Wellness Visit (AWV)  10/24/2024    Urinary Incontinence Screening  10/24/2024    Depression Screening  06/18/2025    Osteoporosis Screening  Completed    Pneumococcal Vaccine: 65+ Years  Completed    RSV  Vaccine age 0-20 Months  Aged Out    HIB Vaccine  Aged Out    IPV Vaccine  Aged Out    Hepatitis A Vaccine  Aged Out    Meningococcal ACWY Vaccine  Aged Out    HPV Vaccine  Aged Out       CBC:   Results from last 6 Months   Lab Units 08/20/24  0950   WBC Thousand/uL 10.93*   RBC Million/uL 3.40*   HEMOGLOBIN g/dL 9.3*   HEMATOCRIT % 30.2*   MCV fL 89   MCH pg 27.4   MCHC g/dL 30.8*   RDW % 16.9*   MPV fL 9.3   PLATELETS Thousands/uL 457*   NRBC AUTO /100 WBCs 0   SEGS PCT % 70   LYMPHO PCT % 13*   MONO PCT % 7   EOS PCT % 8*   BASOS PCT % 1   TOTAL NEUT ABS Thousands/µL 7.71*   LYMPHS ABS Thousands/µL 1.47   MONOS ABS Thousand/µL 0.76   EOS ABS Thousand/µL 0.85*     Chemistry Profile:   Results from last 6 Months   Lab Units 08/20/24  0950 08/10/24  0515 08/09/24  0542 08/08/24  0529 08/07/24  0518   POTASSIUM mmol/L 4.7   < > 4.0   < > 4.2   CHLORIDE mmol/L 102   < > 106   < > 103   CO2 mmol/L 28   < > 30   < > 27   BUN mg/dL 21   < > 31*   < > 39*   CREATININE mg/dL 0.73   < > 0.79   < > 0.88   GLUCOSE FASTING mg/dL  --   --   --   --  106*   CALCIUM mg/dL 9.7   < > 9.3   < > 8.4   MAGNESIUM mg/dL  --   --  1.6*   < > 2.5   AST U/L 23  --   --   --   --    ALT U/L 18  --   --   --   --    ALK PHOS U/L 89  --   --   --   --    EGFR ml/min/1.73sq m 75   < > 68   < > 60    < > = values in this interval not displayed.     Coagulation Studies:   Results from last 6 Months   Lab Units 08/20/24  0000 08/14/24  0000 08/13/24  0507 07/30/24  0949 07/29/24  1330   PROTIME seconds  --   --  25.0*   < > 59.4*   INR  2.10*   < > 2.27*   < > 6.66*   PTT seconds  --   --   --   --  94*    < > = values in this interval not displayed.     Endocrine Studies:   Results from last 6 Months   Lab Units 06/13/24  0709   TSH 3RD GENERATON uIU/mL 1.252       Imaging: XR chest 2 views    Result Date: 8/13/2024  Narrative: XR CHEST PA & LATERAL INDICATION: Patient s/p Pacemaker/ICD Insertion. To be done post-procedure day one at 7a.m. and  read before 9 a.m. Do not lift affected arm above shoulder.. COMPARISON: Chest radiograph 2024 FINDINGS: Left chest wall intracardiac device with intact lead(s). No abandoned lead(s). Low lung volumes, which causes crowding of bronchovascular markings.  Within that limitation, there is no focal lung opacity. No pneumothorax or pleural effusion. Normal cardiomediastinal silhouette. Degenerative changes in the shoulders. Normal upper abdomen.     Impression: No acute cardiopulmonary disease. Workstation performed: [id number]     XR chest portable    Result Date: 2024  Narrative: XR CHEST PORTABLE INDICATION: Patient s/p Pacemaker/ICD Insertion. COMPARISON: 2024 FINDINGS: Clear lungs. No pneumothorax or pleural effusion. Mild cardiomegaly. TAVR. Left-sided dual-chamber pacemaker. Degenerative changes in both shoulders. Multiple surgical anchors in the right shoulder. Normal upper abdomen.     Impression: No acute cardiopulmonary disease. Left-sided pacemaker. Workstation performed: [id number]     Cardiac ep lab eps/ablations    Result Date: 2024  Narrative: Images from the original result were not included. ELECTROPHYSIOLOGY OPERATIVE REPORT PATIENT NAME: Marisol Otoole :  1940 MRN: [id number] Date of surgery: 24 Surgeon: Clarke Zuluaga MD Pt Location: Cath Lab PROCEDURE PERFORMED: 1)Dual Chamber Permanent Pacemaker Implantation- HIS/Left bundle pacing w deep septal lead to preserve narrow QRS Preoperative Medications: Ancef 2 g ANESTHESIA: Moderate sedation provided by anesthesia PREOPERATIVE DIAGNOSIS: Symptomatic bradycardia POSTOPERATIVE DIAGNOSIS: Successful Dual Chamber Permanent Pacemaker implant.  Same as Preop. Informed Consent: Risks, benefits, and alternatives discussed with patient and any family present. The patient understands risks, which include but are not limited to life threatening  bleeding, infection, air around lungs, blood around the heart and reoperation  dislodged or malfunctioning device. Procedure Description: After informed consent was obtained, the patient was brought to the electrophysiology laboratory NPO. A time out was called and the patient was properly identified. The patient was pre-medicated as above. The left infraclavicular area was prepped and draped in the usual sterile fashion. After local anesthetic infiltration with 1% lidocaine, an incision was made below clavicle. The incision was extended down to the level of the pectoral fascia. A pocket was formed above the pectoral fascia using cautery and blunt dissection.  A fluoroscopic guided approach was done. A safe sheath introducer was advanced over a wire into the axillary vein, the wire was confirmed to be in the right atrium on flouroscopy, the wire was removed. Under fluoroscopic guidance the right ventricular lead was positioned on the right ventricular septum basal septum, delivered with [employer] sheath and screwed in deeply to to capture the left bunlde with narrow QRS with right bundle branch block pattern in V1,  we confirmed it to be intraseptal pacing with relatively high impedence and good threshold and sensing with unipolar pacing. After satisfactory ventricular sensing and pacing thresholds were confirmed, the lead was sewn to the pectoral fascia/muscle using 2-0 Ethibond sutures. Under fluoroscopic guidance the right atrial lead was placed in the right atrial appendage using an active atrial lead, tissue contact was confirmed and good lead parameters were seen, the lead helix was extended, the Safe-sheath was removed and the lead was tied down with 2-0 Ethibond sutures to the muscle. The pacemaker leads were attached to the generator.  The pocket was flushed with gentamicin solution. The lead and pulse generator were placed in the pre-pectoral pocket.  Pacemaker generator was sutured to the muscle layer. A [employer] Absorbable Antibiotic pouch was used.  The pocket was then closed  with 3 layers of 2-0, 3-0, 4-0 -Vicryl suture was used to close the skin.  Surgical glue was applied over the closed incision EBL: Minimal Complications: None Contrast: 10 cc for venogram Findings: The patient tolerated the procedure well. Plan: Routine postoperative care, device interrogation and CXR tomorrow morning. Follow-up in 2 weeks with incision check and interrogation.     XR chest portable    Result Date: 8/8/2024  Narrative: XR CHEST PORTABLE INDICATION: wheezing, fall, hx COPD. COMPARISON: August 6, 2024 FINDINGS: Study is limited. Artifacts project over the chest. No obvious infiltrates. No pneumothorax or pleural effusion. Grossly stable cardiomediastinal silhouette. Electronic device projects over the heart. Probably outside the patient evidence of prior TAVR. Stable appearance of osseous structures. There appear to be some chronic changes at both shoulders. Laxity of the joints and degenerative arthritic changes are noted. There is degenerative disc disease throughout the visualized spine. Normal upper abdomen.     Impression: Limited study. No acute abnormality is appreciated Workstation performed: [id number]     CT head without contrast    Result Date: 8/8/2024  Narrative: CT BRAIN - WITHOUT CONTRAST INDICATION:   Headache and head trauma. COMPARISON: 8/6/2024. TECHNIQUE:  CT examination of the brain was performed.  Multiplanar 2D reformatted images were created from the source data. Radiation dose length product (DLP) for this visit:  786 mGy-cm .  This examination, like all CT scans performed in the [facility], was performed utilizing techniques to minimize radiation dose exposure, including the use of iterative reconstruction and automated exposure control. IMAGE QUALITY:  Diagnostic. FINDINGS: PARENCHYMA: Periventricular and subcortical hypoattenuating foci consistent with microangiopathic disease. No acute intracranial hemorrhage or mass effect. VENTRICLES AND EXTRA-AXIAL  SPACES: No hydrocephalus or extra-axial collection. VISUALIZED ORBITS: Intact. PARANASAL SINUSES: Clear. CALVARIUM AND EXTRACRANIAL SOFT TISSUES: No lytic or blastic lesion or fracture.     Impression: No acute intracranial abnormality. Workstation performed: [id number]     XR shoulder 2+ views LEFT    Result Date: 8/6/2024  Narrative: XR SHOULDER 2+ VW LEFT INDICATION: FALL SHOULDER PAIN. COMPARISON: 8/11/2023 FINDINGS: No acute fracture or dislocation. Severe glenohumeral osteoarthritis. No lytic or blastic osseous lesion. Unremarkable soft tissues.     Impression: No acute osseous abnormality. Computerized Assisted Algorithm (CAA) may have been used to analyze all applicable images. Workstation performed: [id number]     XR shoulder 2+ views RIGHT    Result Date: 8/6/2024  Narrative: XR SHOULDER 2+ VW RIGHT INDICATION: FALL SHOULDER PAIN. COMPARISON: None FINDINGS: No acute fracture or dislocation. Corticated osseous fragment adjacent to the greater tuberosity consistent with old trauma. Severe glenohumeral osteoarthritis. Postsurgical changes noted at the right glenoid No lytic or blastic osseous lesion. Unremarkable soft tissues.     Impression: No acute osseous abnormality. Computerized Assisted Algorithm (CAA) may have been used to analyze all applicable images. Workstation performed: [id number]     XR foot 3+ views RIGHT    Result Date: 8/6/2024  Narrative: XR FOOT 3+ VW RIGHT INDICATION: bruising, erythema and swelling. COMPARISON: None FINDINGS: No acute fracture or dislocation. Degenerative changes in the first metatarsophalangeal joint. Diffuse osteopenia. Unremarkable soft tissues.     Impression: No acute osseous abnormality. Computerized Assisted Algorithm (CAA) may have been used to analyze all applicable images. Workstation performed: [id number]     XR chest 1 view portable    Result Date: 8/6/2024  Narrative: XR CHEST PORTABLE INDICATION: fall. COMPARISON: CXR 7/29/2024, chest CT 4/6/2024, cervical  spine CT from today. FINDINGS: Low lung volumes producing vascular crowding. Question mild pulmonary venous congestion. No pneumothorax or pleural effusion. Mild cardiomegaly. TAVR. No acute displaced fracture. Old right humeral head and neck fracture. Suture anchors right glenohumeral joint. Severe left glenohumeral degenerative disease. Normal upper abdomen.     Impression: Skeleton normal for age.  No acute displaced fractures. Low lung volumes producing vascular crowding. Question mild pulmonary venous congestion. Workstation performed: [id number]     CT spine cervical without contrast    Result Date: 8/6/2024  Narrative: CT CERVICAL SPINE - WITHOUT CONTRAST INDICATION:   fall, head strike. COMPARISON: CT 4/6/2024. TECHNIQUE:  CT examination of the cervical spine was performed without intravenous contrast.  Contiguous axial images were obtained. Multiplanar 2D reformatted images were created from the source data. Radiation dose length product (DLP) for this visit:  431 mGy-cm .  This examination, like all CT scans performed in the [facility], was performed utilizing techniques to minimize radiation dose exposure, including the use of iterative reconstruction and automated exposure control. IMAGE QUALITY:  Diagnostic. FINDINGS: ALIGNMENT: Unchanged grade 1 anterolisthesis at C3-4 and grade 1 retrolisthesis at C5-6. No acute subluxation. VERTEBRAE: No acute fracture. Bony fusion at C4-5 again noted. DEGENERATIVE CHANGES:  Severe multilevel cervical degenerative changes are noted.  No critical central canal stenosis. PREVERTEBRAL AND PARASPINAL SOFT TISSUES: Unremarkable THORACIC INLET:  Normal.     Impression: No cervical spine fracture or traumatic malalignment. Workstation performed: [id number]     CT head without contrast    Result Date: 8/6/2024  Narrative: CT BRAIN - WITHOUT CONTRAST INDICATION:   fall head stike on coumadin. COMPARISON: CT head 4/6/2024. TECHNIQUE:  CT examination of the  brain was performed.  Multiplanar 2D reformatted images were created from the source data. Radiation dose length product (DLP) for this visit:  871 mGy-cm .  This examination, like all CT scans performed in the [facility], was performed utilizing techniques to minimize radiation dose exposure, including the use of iterative reconstruction and automated exposure control. IMAGE QUALITY:  Diagnostic. FINDINGS: PARENCHYMA: Decreased attenuation is noted in periventricular and subcortical white matter demonstrating an appearance that is statistically most likely to represent mild microangiopathic change. No CT signs of acute infarction.  No intracranial mass, mass effect or midline shift.  No acute parenchymal hemorrhage. VENTRICLES AND EXTRA-AXIAL SPACES:  Normal for the patient's age. VISUALIZED ORBITS: Normal visualized orbits. PARANASAL SINUSES: Normal visualized paranasal sinuses. CALVARIUM AND EXTRACRANIAL SOFT TISSUES:  Normal.     Impression: No acute intracranial abnormality. Workstation performed: [id number]     Echo complete    Result Date: 7/30/2024  Narrative:   Left Ventricle: Left ventricular cavity size is normal. Wall thickness is normal. The left ventricular ejection fraction is 65%. Systolic function is normal. Wall motion is normal. Diastolic function is normal.   Right Ventricle: Right ventricular cavity size is dilated. Systolic function is normal.   Left Atrium: The atrium is mildly dilated.   Right Atrium: The atrium is mildly dilated.   Aortic Valve: There is a TAVR bioprosthetic valve.  Peak velocity was 2.72 m/s.  Peak and mean gradients across the valve were 29 and 14 mmHg respectively.  Aortic valve area by the continuity equation method was 1.87 cm². There is mild regurgitation.   Mitral Valve: There is moderate annular calcification. There is mild regurgitation. There is moderate stenosis.  Mitral valve area by the PHT method was 1 cm².  Mean pressure gradient across the  valve was 4 mmHg.   Tricuspid Valve: There is moderate to severe regurgitation. The right ventricular systolic pressure is mildly to moderately elevated. The estimated right ventricular systolic pressure is 54.00 mmHg.   Pulmonic Valve: There is mild regurgitation.   IVC/SVC: The right atrial pressure is estimated at 8.0 mmHg. The inferior vena cava is dilated. Respirophasic changes were normal.     X-ray chest 2 views    Result Date: 7/29/2024  Narrative: XR CHEST PA & LATERAL INDICATION: chest pain. COMPARISON: Portable chest 4/6/2024. CT chest 4/6/2024. FINDINGS: Clear lungs. No pneumothorax or pleural effusion. Prosthetic cardiac valve noted. Otherwise unremarkable cardiomediastinal silhouette. Old posttraumatic and postsurgical changes in the right proximal humerus and glenoid. Normal upper abdomen.     Impression: No acute cardiopulmonary disease. Workstation performed: [id number]         Review of Systems   Constitutional:  Positive for fatigue. Negative for chills, diaphoresis and fever.   HENT:  Negative for ear pain and sore throat.    Eyes:  Negative for pain and visual disturbance.   Respiratory:  Positive for shortness of breath (2L at home, prn). Negative for cough.    Cardiovascular:  Negative for chest pain and palpitations.   Gastrointestinal:  Negative for abdominal pain and vomiting.   Genitourinary:  Negative for dysuria and hematuria.   Musculoskeletal:  Positive for arthralgias and gait problem. Negative for back pain.   Skin:  Negative for color change and rash.   Neurological:  Negative for dizziness, seizures, syncope, light-headedness and headaches.   All other systems reviewed and are negative.        Objective:     Physical Exam  Constitutional:       Appearance: She is well-developed. She is obese.   HENT:      Right Ear: Tympanic membrane normal.      Left Ear: A middle ear effusion is present.   Cardiovascular:      Rate and Rhythm: Normal rate and regular rhythm.      Heart sounds:  "Normal heart sounds. No murmur heard.  Pulmonary:      Effort: Pulmonary effort is normal. No respiratory distress.      Breath sounds: Normal breath sounds.   Skin:     General: Skin is warm and dry.   Neurological:      Mental Status: She is alert and oriented to person, place, and time.   Psychiatric:         Mood and Affect: Mood normal.           /70 (BP Location: Left arm, Patient Position: Sitting)   Pulse 59   Ht 4' 6\" (1.372 m)   Wt 82.6 kg (182 lb)   SpO2 91% Comment: without O2  BMI 43.88 kg/m²     Vitals:    08/22/24 1124   BP: 110/70   BP Location: Left arm   Patient Position: Sitting   Pulse: 59   SpO2: 91%   Weight: 82.6 kg (182 lb)   Height: 4' 6\" (1.372 m)       Transitional Care Management Review:  Marisol Otoole is a 83 y.o. female here for TCM follow up.     During the TCM phone call patient stated:    TCM Call       Date and time call was made  8/14/2024 10:54 AM    Hospital care reviewed  Records reviewed    Patient was hospitialized at  [facility]    Date of Admission  08/09/24    Date of discharge  08/13/24    Diagnosis  Junctional bradycardia    Disposition  Home    Were the patients medications reviewed and updated  Yes    Current Symptoms  None          TCM Call       Post hospital issues  Reduced activity    Should patient be enrolled in anticoag monitoring?  No    Scheduled for follow up?  Yes    Did you obtain your prescribed medications  Yes    Do you need help managing your prescriptions or medications  No    Is transportation to your appointment needed  No    I have advised the patient to call PCP with any new or worsening symptoms  Lyn ARDON    Living Arrangements  Alone    Are you recieving any outpatient services  No    Are you using any community resources  No    Current waiver services  No    Have you fallen in the last 12 months  Yes    How many times  1    Interperter language line needed  No    Counseling  Patient      "                   IVY HallmanNP

## 2024-08-23 DIAGNOSIS — N39.41 URGE INCONTINENCE: ICD-10-CM

## 2024-08-23 RX ORDER — OXYBUTYNIN CHLORIDE 5 MG/1
5 TABLET, EXTENDED RELEASE ORAL DAILY
Qty: 90 TABLET | Refills: 1 | Status: SHIPPED | OUTPATIENT
Start: 2024-08-23

## 2024-08-26 NOTE — TELEPHONE ENCOUNTER
----- Message from Charlotte Garay sent at 10/2/2017  3:22 PM CDT -----  Contact: Patient  Patient states that she is returning the call and to please call her back at  [phone].  Thank you   Paper work scanned and faxed

## 2024-08-28 ENCOUNTER — TELEPHONE (OUTPATIENT)
Age: 84
End: 2024-08-28

## 2024-08-28 ENCOUNTER — ANTICOAG VISIT (OUTPATIENT)
Dept: CARDIOLOGY CLINIC | Facility: CLINIC | Age: 84
End: 2024-08-28

## 2024-08-28 LAB — INR PPP: 3 (ref 0.85–1.19)

## 2024-08-28 NOTE — TELEPHONE ENCOUNTER
Received a message from Ludmila at [facility] to let you know of fingerstick result for PT / INR drawn today .    PT 36.6  INR 3.0

## 2024-08-29 ENCOUNTER — IN-CLINIC DEVICE VISIT (OUTPATIENT)
Dept: CARDIOLOGY CLINIC | Facility: CLINIC | Age: 84
End: 2024-08-29

## 2024-08-29 DIAGNOSIS — Z95.0 PRESENCE OF PERMANENT CARDIAC PACEMAKER: Primary | ICD-10-CM

## 2024-08-29 PROCEDURE — 99024 POSTOP FOLLOW-UP VISIT: CPT | Performed by: INTERNAL MEDICINE

## 2024-08-29 NOTE — PROGRESS NOTES
MDT DC PM/ACTIVE SYSTEM IS MRI CONDITIONAL   DEVICE INTERROGATED IN THE [place] OFFICE:  BATTERY VOLTAGE ADEQUATE (12.7 YR.).  AP 70.7%  3.3%.  ALL LEAD PARAMETERS WITHIN NORMAL LIMITS.  NO SIGNIFICANT HIGH RATE EPISODES.  NO PROGRAMMING CHANGES MADE TO DEVICE PARAMETERS.  INCISION CLEAN AND DRY WITH EDGES APPROXIMATED (SEE MEDIA).  WOUND CARE AND RESTRICTIONS REVIEWED WITH PATIENT.  NORMAL DEVICE FUNCTION.  RG

## 2024-09-06 ENCOUNTER — NURSE TRIAGE (OUTPATIENT)
Age: 84
End: 2024-09-06

## 2024-09-06 NOTE — TELEPHONE ENCOUNTER
"Reason for Disposition  • Nursing judgment    Answer Assessment - Initial Assessment Questions  1. REASON FOR CALL: \"What is your main concern right now?\"      Call from home physical therapist with update on vital signs prior to office visit on Monday 9/9 with Dr Esteves  2. ONSET:       Today at home PT visit      5. OTHER SYMPTOMS: \"Do you have any other new symptoms?\"      This is therapists first time at the patient's home for physical therapy.  Patient is s/p pacemaker insertion.  Her HR today was 60.  BP on left arm was 99/50, Right arm 110/50.  With exertion, patient's O2 sat dropped to 86% and patient had no complaints.  Was not feeling short of breath, was not dizzy or lightheaded.  Sat comes back up >92 with resting.  Therapist wanted to give provider an update ahead of office visit on Monday, 9/9.    Protocols used: No Protocol Available-ADULT-[place]    "

## 2024-09-09 ENCOUNTER — OFFICE VISIT (OUTPATIENT)
Dept: CARDIOLOGY CLINIC | Facility: CLINIC | Age: 84
End: 2024-09-09
Payer: MEDICARE

## 2024-09-09 VITALS
HEART RATE: 74 BPM | BODY MASS INDEX: 40.96 KG/M2 | WEIGHT: 177 LBS | OXYGEN SATURATION: 95 % | SYSTOLIC BLOOD PRESSURE: 130 MMHG | RESPIRATION RATE: 16 BRPM | DIASTOLIC BLOOD PRESSURE: 80 MMHG | HEIGHT: 55 IN

## 2024-09-09 DIAGNOSIS — I50.32 CHRONIC HEART FAILURE WITH PRESERVED EJECTION FRACTION (HFPEF) (HCC): ICD-10-CM

## 2024-09-09 DIAGNOSIS — E78.2 MIXED HYPERLIPIDEMIA: ICD-10-CM

## 2024-09-09 DIAGNOSIS — Z95.0 CARDIAC PACEMAKER: ICD-10-CM

## 2024-09-09 DIAGNOSIS — Z95.2 HISTORY OF TRANSCATHETER AORTIC VALVE REPLACEMENT (TAVR): ICD-10-CM

## 2024-09-09 DIAGNOSIS — I35.0 NON-RHEUMATIC AORTIC STENOSIS: ICD-10-CM

## 2024-09-09 DIAGNOSIS — Z79.01 ANTICOAGULANT LONG-TERM USE: ICD-10-CM

## 2024-09-09 DIAGNOSIS — I25.10 CORONARY ARTERY DISEASE INVOLVING NATIVE CORONARY ARTERY OF NATIVE HEART WITHOUT ANGINA PECTORIS: ICD-10-CM

## 2024-09-09 DIAGNOSIS — I49.5 SSS (SICK SINUS SYNDROME) (HCC): ICD-10-CM

## 2024-09-09 DIAGNOSIS — I48.0 PAROXYSMAL ATRIAL FIBRILLATION (HCC): Primary | ICD-10-CM

## 2024-09-09 DIAGNOSIS — I27.20 PULMONARY HYPERTENSION (HCC): Chronic | ICD-10-CM

## 2024-09-09 DIAGNOSIS — I34.2 NONRHEUMATIC MITRAL VALVE STENOSIS: ICD-10-CM

## 2024-09-09 DIAGNOSIS — G47.33 OSA (OBSTRUCTIVE SLEEP APNEA): ICD-10-CM

## 2024-09-09 DIAGNOSIS — E66.01 MORBID OBESITY DUE TO EXCESS CALORIES (HCC): ICD-10-CM

## 2024-09-09 DIAGNOSIS — I10 PRIMARY HYPERTENSION: ICD-10-CM

## 2024-09-09 DIAGNOSIS — I36.1 NONRHEUMATIC TRICUSPID VALVE REGURGITATION: ICD-10-CM

## 2024-09-09 PROCEDURE — 99214 OFFICE O/P EST MOD 30 MIN: CPT | Performed by: INTERNAL MEDICINE

## 2024-09-09 NOTE — PROGRESS NOTES
CARDIOLOGY OFFICE VISIT  [facility]  [address], [place] [address]  Tel: [phone]      NAME: Marisol Otoole  AGE: 83 y.o.  SEX: female  : 1940  MRN: [id number]      Chief Complaint:  Chief Complaint   Patient presents with    Follow-up         History of Present Illness:   Patient comes for follow up. States she is doing okay from cardiac stand point and denies chest pain / pressure, SOB, palpitations, lightheadedness, syncope, swelling feet (none recently), orthopnea, PND, claudication.    PAF - it was picked up on event monitor.  Not on rate control medication due to tendency for bradycardia and the fact that her ventricular rate remains within controlled range on its own. On Coumadin for anticoagulation with goal INR 2-3.     SSS s/p Medtronic dual chamber PPM (24) by Dr. Zuluaga.  Gets regular pacemaker interrogations done     Severe aortic stenosis S/P TAVR on 10/9/18 -  On ASA.  Patient is aware of need for antibiotic prophylaxis prior to dental work     Chronic diastolic congestive heart failure -  Currently euvolemic.  On furosemide 20 mg OD. States she watches her salt intake very closely and weighs herself daily    Nonobstructive CAD.  On aspirin, statin     Essential hypertension -  She was hypertensive for many years.  Currently not needing an any antihypertensive medication. Denies lightheadedness, headache, medication side effects.       Mixed hyperlipidemia -  Has had hyperlipidemia for many years.  Taking statin regularly along with diet control.  Denies myalgia.  PCP closely monitoring the blood work.     Mitral stenosis, tricuspid regurgitation-  Stable. Follow-up with serial echocardiograms     PHTN - from JANICE, valvular disease     Morbid obesity -  Has lost about 30 lbs weight over the last many months     JANICE - now uses CPAP. She does have history of pulmonary hypertension and now PAF              Past Medical History:  Past Medical History:   Diagnosis Date    Anemia 08/22/2018    Anxiety     Arthritis     AVB (atrioventricular block)     first degree    Cataract     CHF (congestive heart failure) (HCC)     COPD, mild (HCC)     Coronary artery disease     Dislocation of right shoulder joint     Frequent UTI     GERD (gastroesophageal reflux disease)     H/O: pneumonia     Heme positive stool     Hyperlipidemia     Hypertension     Hypothyroidism     Morbid obesity with BMI of 50.0-59.9, adult (HCC)     Obesity, morbid (HCC) 08/22/2018    JANICE on CPAP     Pulmonary hypertension ([facility]) 08/22/2018    Severe aortic stenosis     Simple goiter     Skin cyst     within the armpits, right    Wears glasses          Past Surgical History:  Past Surgical History:   Procedure Laterality Date    BREAST BIOPSY      CARDIAC CATHETERIZATION      CARDIAC ELECTROPHYSIOLOGY PROCEDURE N/A 8/12/2024    Procedure: Cardiac pacer implant;  Surgeon: Clarke Zuluaga MD;  Location: [facility];  Service: Cardiology    CARPAL TUNNEL RELEASE Bilateral     CHOLECYSTECTOMY      DILATION AND CURETTAGE OF UTERUS      HYSTEROSCOPY      MASTOID SURGERY      MA COLONOSCOPY FLX DX W/COLLJ SPEC WHEN PFRMD N/A 9/6/2018    Procedure: COLONOSCOPY;  Surgeon: Shree Sosa III, MD;  Location: [facility];  Service: Gastroenterology    MA ECHO TRANSESOPHAG R-T 2D W/PRB IMG ACQUISJ I&R N/A 10/9/2018    Procedure: INTRA-OP TRANSESOPHAGEAL ECHOCARDIOGRAM (GARRISON);  Surgeon: Kushal Camarena DO;  Location:  MAIN OR;  Service: Cardiac Surgery    MA ESOPHAGOGASTRODUODENOSCOPY TRANSORAL DIAGNOSTIC N/A 8/31/2018    Procedure: ESOPHAGOGASTRODUODENOSCOPY (EGD);  Surgeon: Shree Sosa III, MD;  Location: [facility];  Service: Gastroenterology    MA REPLACE AORTIC VALVE OPENFEMORAL ARTERY APPROACH N/A 10/9/2018    Procedure: REPLACEMENT AORTIC VALVE TRANSCATHETER (TAVR) TRANSFEMORAL W/ 23 MM MENDOZA NOE S3 VALVE (ACCESS OF LEFT);  Surgeon:  Kushal Camarena DO;  Location: [facility];  Service: Cardiac Surgery    TOTAL HIP ARTHROPLASTY Left 2007    TOTAL KNEE ARTHROPLASTY Bilateral          Family History:  Family History   Problem Relation Age of Onset    Diabetes Mother     Stroke Mother     Cancer Father     Lung cancer Father     Diabetes Sister     Heart disease Sister     Hypertension Sister     Coronary artery disease Family     Diabetes Family     Hypertension Family     Cancer Family     Stroke Family     Thyroid disease Neg Hx          Social History:  Social History     Socioeconomic History    Marital status: Single     Spouse name: None    Number of children: None    Years of education: None    Highest education level: None   Occupational History    Occupation: retired   Tobacco Use    Smoking status: Never     Passive exposure: Never    Smokeless tobacco: Never   Vaping Use    Vaping status: Never Used   Substance and Sexual Activity    Alcohol use: Not Currently    Drug use: Never    Sexual activity: Never   Other Topics Concern    None   Social History Narrative    Denied drinking coffee    Denied exercise habits    Most recent tobacco use screenin2018      Do you currently or have you served in the [employer]:   No      Were you activated, into active duty, as a member of the [employer] or as a Reservist:   No          Social Determinants of Health     Financial Resource Strain: Low Risk  (10/24/2023)    Overall Financial Resource Strain (CARDIA)     Difficulty of Paying Living Expenses: Not hard at all   Food Insecurity: No Food Insecurity (2024)    Hunger Vital Sign     Worried About Running Out of Food in the Last Year: Never true     Ran Out of Food in the Last Year: Never true   Transportation Needs: No Transportation Needs (2024)    PRAPARE - Transportation     Lack of Transportation (Medical): No     Lack of Transportation (Non-Medical): No   Physical Activity: Not on file   Stress: Not on file    Social Connections: Not on file   Intimate Partner Violence: Not on file   Housing Stability: Low Risk  (8/13/2024)    Housing Stability Vital Sign     Unable to Pay for Housing in the Last Year: No     Number of Times Moved in the Last Year: 0     Homeless in the Last Year: No         Active Problems:  Patient Active Problem List   Diagnosis    Acquired hypothyroidism    Primary hypertension    Hyperlipidemia    JANICE (obstructive sleep apnea)    LVH (left ventricular hypertrophy)    Mitral annular calcification    Mitral valve stenosis    Pulmonary hypertension (HCC)    Acute diastolic congestive heart failure (HCC)    Iron deficiency anemia secondary to inadequate dietary iron intake    Obesity, morbid (HCC)    Gastroesophageal reflux disease without esophagitis    Primary osteoarthritis of left shoulder    AVB (atrioventricular block)    Chronic heart failure with preserved ejection fraction (HFpEF) (HCC)    COPD, mild ([facility])    Coronary artery disease involving native coronary artery of native heart without angina pectoris    History of transcatheter aortic valve replacement (TAVR)    Insomnia    Osteopenia    Depression, recurrent (HCC)    Chronic hypoxemic respiratory failure (HCC)    Radiculopathy, lumbar region    Orbital mass    Fall    Leucocytosis    Ambulatory dysfunction    At risk for injury related to fall    Walker as ambulation aid    Urinary frequency    Junctional bradycardia    Anticoagulant long-term use    Paroxysmal atrial fibrillation (HCC)    Presence of permanent cardiac pacemaker    SSS (sick sinus syndrome) (HCC)    Cardiac pacemaker    Non-rheumatic aortic stenosis    Nonrheumatic tricuspid valve regurgitation         The following portions of the patient's history were reviewed and updated as appropriate: past medical history, past surgical history, past family history,  past social history, current medications, allergies and problem list.      Review of Systems:  Constitutional: Denies  fever, chills  Eyes: Denies eye redness, eye discharge  ENT: Denies hearing loss, sneezing, nasal discharge, sore throat   Respiratory: Denies cough, expectoration  Cardiovascular: Denies chest pain, palpitations  Gastrointestinal: Denies abdominal pain, nausea, vomiting, diarrhea  Genito-Urinary: Denies dysuria  Musculoskeletal: Denies back pain, muscle pain  Neurologic: Denies lightheadedness, syncope, headache, seizures  Endocrine: Denies polydipsia, temperature intolerance  Allergy and Immunology: Denies hives, insect bite sensitivity  Hematological and Lymphatic: Denies bleeding problems, swollen glands   Psychological: Denies depression, suicidal ideation, anxiety, panic  Dermatological: Denies pruritus, rash, skin lesion changes      Vitals:  Vitals:    09/09/24 0917   BP: 130/80   Pulse: 74   Resp: 16   SpO2: 95%       Body mass index is 42.68 kg/m².    Weight (last 2 days)       Date/Time Weight    09/09/24 0917 80.3 (177)              Physical Examination:  General: Patient is not in acute distress. Awake, alert, oriented in time, place and person. Responding to commands.  Morbidly obese.  Using a walker for support  Head: Normocephalic. Atraumatic  Eyes: Both pupils normal sized, round and reactive to light. Nonicteric  ENT: Normal external ear canals  Neck: Supple. JVP not raised. Trachea central. No thyromegaly  Lungs: Bilateral bronchovascular breath sounds with no crackles or rhonchi  Chest wall: No tenderness  Cardiovascular: RRR. S1 and S2 normal. Grade 2/6 MDM at apex. Grade 3/6 PSM at LLSB  Gastrointestinal: Abdomen soft, nontender. No guarding or rigidity. Liver and spleen not palpable. Bowel sounds present  Neurologic: Patient is awake, alert, oriented in time, place and person. Responding to commands. Moving all extremities  Integumentary:  No skin rash  Lymphatic: No cervical lymphadenopathy  Back: Symmetric. No CVA tenderness  Extremities: No clubbing, cyanosis or edema      Laboratory  "Results:  CBC with diff:   Lab Results   Component Value Date    WBC 10.93 (H) 08/20/2024    RBC 3.40 (L) 08/20/2024    HGB 9.3 (L) 08/20/2024    HCT 30.2 (L) 08/20/2024    MCV 89 08/20/2024    MCH 27.4 08/20/2024    RDW 16.9 (H) 08/20/2024     (H) 08/20/2024       CMP:  Lab Results   Component Value Date    CREATININE 0.73 08/20/2024    CREATININE 0.77 01/30/2024    BUN 21 08/20/2024    BUN 26 (H) 01/30/2024    K 4.7 08/20/2024    K 4.6 01/30/2024     08/20/2024     01/30/2024    CO2 28 08/20/2024    CO2 29 01/30/2024    GLUCOSE 101 10/09/2018    ALKPHOS 89 08/20/2024    ALKPHOS 63 01/30/2024    ALT 18 08/20/2024    ALT 18 01/30/2024    AST 23 08/20/2024    AST 22 01/30/2024    BILIDIR 0.15 04/06/2024       Lab Results   Component Value Date    HGBA1C 6.1 (H) 08/18/2023    MG 1.6 (L) 08/09/2024    MG 1.7 01/30/2024       Lab Results   Component Value Date    TROPONINI <0.02 08/14/2018    TROPONINI <0.02 08/14/2018    TROPONINI <0.02 08/13/2018    CKTOTAL 540 (H) 08/14/2023       Lipid Profile:   No results found for: \"CHOL\"  Lab Results   Component Value Date    HDL 65 06/13/2024    HDL 77 01/22/2024    HDL 67 07/11/2023     Lab Results   Component Value Date    LDLCALC 50 06/13/2024    LDLCALC 53 01/22/2024    LDLCALC 54 07/11/2023     Lab Results   Component Value Date    TRIG 55 06/13/2024    TRIG 95 01/22/2024    TRIG 118 07/11/2023       Cardiac testing:   Results for orders placed during the hospital encounter of 07/29/24    Echo complete    Interpretation Summary    Left Ventricle: Left ventricular cavity size is normal. Wall thickness is normal. The left ventricular ejection fraction is 65%. Systolic function is normal. Wall motion is normal. Diastolic function is normal.    Right Ventricle: Right ventricular cavity size is dilated. Systolic function is normal.    Left Atrium: The atrium is mildly dilated.    Right Atrium: The atrium is mildly dilated.    Aortic Valve: There is a TAVR " bioprosthetic valve.  Peak velocity was 2.72 m/s.  Peak and mean gradients across the valve were 29 and 14 mmHg respectively.  Aortic valve area by the continuity equation method was 1.87 cm². There is mild regurgitation.    Mitral Valve: There is moderate annular calcification. There is mild regurgitation. There is moderate stenosis.  Mitral valve area by the PHT method was 1 cm².  Mean pressure gradient across the valve was 4 mmHg.    Tricuspid Valve: There is moderate to severe regurgitation. The right ventricular systolic pressure is mildly to moderately elevated. The estimated right ventricular systolic pressure is 54.00 mmHg.    Pulmonic Valve: There is mild regurgitation.    IVC/SVC: The right atrial pressure is estimated at 8.0 mmHg. The inferior vena cava is dilated. Respirophasic changes were normal.    Medications:    Current Outpatient Medications:     acetaminophen (TYLENOL) 500 mg tablet, Take 500 mg by mouth every 6 (six) hours as needed, Disp: , Rfl:     aspirin (ECOTRIN LOW STRENGTH) 81 mg EC tablet, Take 1 tablet (81 mg total) by mouth daily, Disp: 100 tablet, Rfl: 0    b complex vitamins capsule, Take 1 capsule by mouth 2 (two) times a day  , Disp: , Rfl:     Blood Glucose Monitoring Suppl (OneTouch Verio Reflect) w/Device KIT, Check blood sugars once daily. Please substitute with appropriate alternative as covered by patient's insurance. Dx: E11.65, Disp: 1 kit, Rfl: 0    Calcium Carb-Cholecalciferol (CALCIUM 600 + D PO), Take 1 tablet by mouth 2 (two) times a day, Disp: , Rfl:     Cranberry 250 MG TABS, Take by mouth, Disp: , Rfl:     ferrous sulfate 324 (65 Fe) mg, Take 1 tablet every other day., Disp: 90 tablet, Rfl: 1    fluticasone (FLONASE) 50 mcg/act nasal spray, 1 spray into each nostril daily, Disp: 16 g, Rfl: 1    furosemide (LASIX) 20 mg tablet, Take 1 tablet (20 mg total) by mouth daily for 7 days, Disp: 7 tablet, Rfl: 0    glucose blood (OneTouch Verio) test strip, Check blood sugars  once daily. Please substitute with appropriate alternative as covered by patient's insurance. Dx: E11.65, Disp: 100 each, Rfl: 3    glucose blood test strip, Use 1 each in the morning Use as instructed, Disp: 100 each, Rfl: 4    hydrocortisone 2.5 % cream, Apply topically 2 (two) times a day, Disp: 28 g, Rfl: 1    Lancets (onetouch ultrasoft) lancets, Use in the morning Use as instructed, Disp: 100 each, Rfl: 1    levothyroxine 50 mcg tablet, TAKE 1 TABLET BY MOUTH EVERY DAY, Disp: 100 tablet, Rfl: 1    ofloxacin (OCUFLOX) 0.3 % ophthalmic solution, 5 drops twice daily to left ear x 10 days., Disp: 10 mL, Rfl: 1    omeprazole (PriLOSEC) 40 MG capsule, TAKE 1 CAPSULE BY MOUTH TWICE A DAY, Disp: 180 capsule, Rfl: 1    OneTouch Delica Lancets 33G MISC, Check blood sugars once daily. Please substitute with appropriate alternative as covered by patient's insurance. Dx: E11.65, Disp: 100 each, Rfl: 3    oxybutynin (DITROPAN-XL) 5 mg 24 hr tablet, TAKE 1 TABLET (5 MG TOTAL) BY MOUTH DAILY., Disp: 90 tablet, Rfl: 1    oxygen gas, Inhale 2 L/min continuous. Indications: copd, Disp: , Rfl:     pregabalin (LYRICA) 25 mg capsule, Take 1 capsule (25 mg total) by mouth 2 (two) times a day, Disp: 60 capsule, Rfl: 1    sertraline (ZOLOFT) 100 mg tablet, TAKE 1 TABLET BY MOUTH EVERY DAY, Disp: 90 tablet, Rfl: 1    simvastatin (ZOCOR) 40 mg tablet, TAKE 1 TABLET BY MOUTH EVERYDAY AT BEDTIME, Disp: 100 tablet, Rfl: 1    triamcinolone (KENALOG) 0.1 % cream, Apply topically 2 (two) times a day, Disp: 15 g, Rfl: 1    Turmeric (QC Tumeric Complex) 500 MG CAPS, Take by mouth, Disp: , Rfl:     warfarin (Coumadin) 2.5 mg tablet, Take 1 tablet (2.5mg) Mon-Sat. Take 2 tablets (5mg) on Sun or as directed Do not start before August 9, 2024., Disp: 102 tablet, Rfl: 0      Allergies:  Allergies   Allergen Reactions    Latex Rash    Neosporin [Neomycin-Bacitracin Zn-Polymyx] Rash and Other (See Comments)     hives per [facility] order         Assessment  and Plan:  1. Paroxysmal atrial fibrillation (HCC)  2. Anticoagulant long-term use  Continue Coumadin with goal INR 2-3.  Does not need a rate control medication    3. SSS (sick sinus syndrome) ([facility])  4. Cardiac pacemaker  Last pacemaker interrogation reviewed with the patient.  Continue to get regular pacemaker interrogations    5. Chronic heart failure with preserved ejection fraction (HFpEF) ([facility])  Euvolemic on furosemide 20 mg daily.  States she watches her salt closely and takes her weight daily  Patient asked to increase her diuretic dose if weight goes up by 3 pounds in 1 day or 5 pounds in a week    6. Coronary artery disease involving native coronary artery of native heart without angina pectoris  Stable.  Continue aspirin, statin    7. Mixed hyperlipidemia  Continue simvastatin 40 mg daily and diet control.  Her PCP closely monitor the blood work    8. Non-rheumatic aortic stenosis  9. History of transcatheter aortic valve replacement (TAVR)  To be followed up with serial imaging at regular intervals.  Continue aspirin.  Patient is aware for need for antibiotic prophylaxis prior to dental work    10. Nonrheumatic mitral valve stenosis  11. Nonrheumatic tricuspid valve regurgitation  To be followed up with serial imaging at regular intervals    12. Pulmonary hypertension (HCC)  13.  Morbid obesity  14. JANICE    Recommend aggressive risk factor modification and therapeutic lifestyle changes.  Low-salt, low-calorie, low-fat, low-cholesterol diet with regular exercise and to optimize weight.  I will defer the ordering and monitoring of necessity lab studies to you, but I am available and happy to review and manage any of the data at your request in the future.    Discussed concepts of atherosclerosis, including signs and symptoms of cardiac disease.    Previous studies were reviewed.    Safety measures were reviewed.  Questions were entertained and answered.  Patient was advised to report any problems requiring  medical attention.    Follow-up with PCP and appropriate specialist and lab work as discussed.    Return for follow up visit as scheduled or earlier, if needed.  Thank you for allowing me to participate in the care and evaluation of your patient.  Should you have any questions, please feel free to contact me.    Bro Esteves MD  9/9/2024,9:53 AM

## 2024-09-10 ENCOUNTER — TELEPHONE (OUTPATIENT)
Dept: CARDIOLOGY CLINIC | Facility: CLINIC | Age: 84
End: 2024-09-10

## 2024-09-10 ENCOUNTER — NURSE TRIAGE (OUTPATIENT)
Age: 84
End: 2024-09-10

## 2024-09-10 ENCOUNTER — ANTICOAG VISIT (OUTPATIENT)
Dept: CARDIOLOGY CLINIC | Facility: CLINIC | Age: 84
End: 2024-09-10

## 2024-09-10 LAB — INR PPP: 4 (ref 0.85–1.19)

## 2024-09-10 NOTE — TELEPHONE ENCOUNTER
"Anh from [facility] called in with her INR finger stick 4.0    Please call with instruction at [phone]      Reason for Disposition   Nursing judgment    Answer Assessment - Initial Assessment Questions  1. REASON FOR CALL or QUESTION: \"What is your reason for calling today?\" or \"How can I best help you?\" or \"What question do you have that I can help answer?\"      Anh from [facility] called in with her INR finger stick 4.0    Please call with instruction at [phone]    Protocols used: Information Only Call - No Triage-ADULT-[place]    "

## 2024-09-11 ENCOUNTER — TELEPHONE (OUTPATIENT)
Dept: CARDIOLOGY CLINIC | Facility: CLINIC | Age: 84
End: 2024-09-11

## 2024-09-11 NOTE — TELEPHONE ENCOUNTER
Fax received from [facility].  Order from 09/10/2024  Form scanned in pts chart and placed in Dr. Esteves's response.

## 2024-09-17 ENCOUNTER — TELEPHONE (OUTPATIENT)
Age: 84
End: 2024-09-17

## 2024-09-17 ENCOUNTER — ANTICOAG VISIT (OUTPATIENT)
Dept: CARDIOLOGY CLINIC | Facility: CLINIC | Age: 84
End: 2024-09-17

## 2024-09-17 LAB — INR PPP: 2.6 (ref 0.85–1.19)

## 2024-09-17 NOTE — TELEPHONE ENCOUNTER
Spoke with Anh, [facility], requesting to speak with coumadin clinic, attempted to transfer call, no answer.    Anh just needed to relay INR results from today as 2.6.    Please contact Anh if any adjustments need to be made at [phone].

## 2024-09-17 NOTE — TELEPHONE ENCOUNTER
Caller: Adrienne/ [facility]     Doctor: Dr. Esteves    Reason for call: Received a call from Adrienne advising that she had seen the patient and saw that her incision from the current procedure is red. Stated that patient saw Dr. Esteves on 9/9 and he stated that the area looked fine. Adrienne is requesting a call back to discuss last ov. Please advise    Call back#: [phone]

## 2024-09-17 NOTE — TELEPHONE ENCOUNTER
S/w Anh and the pt. Advised sd and retest in 1 week. After t hat pt will be dc'd from [facility] and will need [facility].

## 2024-09-17 NOTE — TELEPHONE ENCOUNTER
Please schedule pt for a follow up appt as per Dr. Esteves's message.    Ani please see pt's INR result.    Thanks!

## 2024-09-17 NOTE — PROGRESS NOTES
S/w Anh and the pt. Advised sd and retest in 1 week. After t hat pt will be dc'd from [facility] and will need [facility] lab.

## 2024-09-17 NOTE — TELEPHONE ENCOUNTER
Spoke to Anh. Informed her that we have no availability this week but [facility] will be. Anh will give [facility]'s number to schedule appt with Boni this week.  Also called Adrienne and gave her the same information

## 2024-09-17 NOTE — TELEPHONE ENCOUNTER
Spoke with Anh [facility] home health RN, reporting incision has a small scabbed over area that looks like it wants to open up. Reported earlier by therapy provider also.

## 2024-09-18 ENCOUNTER — TELEPHONE (OUTPATIENT)
Dept: CARDIOLOGY CLINIC | Facility: CLINIC | Age: 84
End: 2024-09-18

## 2024-09-18 NOTE — TELEPHONE ENCOUNTER
Spoke to pt. Tried to get her to come to our office in [place], so an EP physician can evaluate her incision site. Pt refused as she doesn't drive and is not able to get a ride to our office. Pt lives in . Discussed w/ Dr. Wise who's here today to see if pt is ok to be seen by PCP. He agreed. Notified pt and asked that she call PCP for appt. Told her I would forward message to her office also so that they're aware. Pt understood.

## 2024-09-18 NOTE — TELEPHONE ENCOUNTER
Patient reports per visiting nurse the incision site of pacemaker is not healing well and she should follow up with provider.  Reports pus  at site. Previous days saw blood but not seeing blood today

## 2024-09-19 ENCOUNTER — OFFICE VISIT (OUTPATIENT)
Dept: FAMILY MEDICINE CLINIC | Facility: CLINIC | Age: 84
End: 2024-09-19
Payer: MEDICARE

## 2024-09-19 VITALS
HEART RATE: 88 BPM | HEIGHT: 55 IN | SYSTOLIC BLOOD PRESSURE: 132 MMHG | WEIGHT: 177.6 LBS | DIASTOLIC BLOOD PRESSURE: 68 MMHG | OXYGEN SATURATION: 98 % | BODY MASS INDEX: 41.1 KG/M2 | TEMPERATURE: 98.8 F

## 2024-09-19 DIAGNOSIS — T81.41XA SUPERFICIAL INCISIONAL INFECTION OF SURGICAL SITE: Primary | ICD-10-CM

## 2024-09-19 DIAGNOSIS — I10 PRIMARY HYPERTENSION: ICD-10-CM

## 2024-09-19 DIAGNOSIS — Z95.0 CARDIAC PACEMAKER: ICD-10-CM

## 2024-09-19 PROCEDURE — G2211 COMPLEX E/M VISIT ADD ON: HCPCS | Performed by: NURSE PRACTITIONER

## 2024-09-19 PROCEDURE — 99214 OFFICE O/P EST MOD 30 MIN: CPT | Performed by: NURSE PRACTITIONER

## 2024-09-19 RX ORDER — MUPIROCIN 20 MG/G
OINTMENT TOPICAL 2 TIMES DAILY
Qty: 22 G | Refills: 0 | Status: SHIPPED | OUTPATIENT
Start: 2024-09-19

## 2024-09-19 NOTE — ASSESSMENT & PLAN NOTE
No drainage or tenderness noted at incision site.  Denies chest pain, weakness, SOB, syncope.  To continue follow-up with cardiology as scheduled.        mychart msg sent

## 2024-09-19 NOTE — LETTER
"2024     Bro Esteves MD  [address] [facility] [address]    Patient: Marisol Otoole   YOB: 1940   Date of Visit: 2024       Dear Dr. Esteves:    Thank you for referring Marisol Otoole to me for evaluation. Below are my notes for this consultation.    If you have questions, please do not hesitate to call me. I look forward to following your patient along with you.         Sincerely,        MABEL Richard        CC: No Recipients    MABEL Richard  2024  2:34 PM  Sign when Signing Visit  Ambulatory Visit  Name: Marisol Otoole      : 1940      MRN: [id number]  Encounter Provider: MABEL Richard  Encounter Date: 2024   Encounter department: [facility] [address]    Assessment & Plan  Superficial incisional infection of surgical site  Advised frequent hand hygiene, wear loose clothing to avoid incision clinging to clothes.  To apply Bactroban as prescribed.    Orders:  •  mupirocin (BACTROBAN) 2 % ointment; Apply topically 2 (two) times a day    Cardiac pacemaker  No drainage or tenderness noted at incision site.  Denies chest pain, weakness, SOB, syncope.  To continue follow-up with cardiology as scheduled.       Primary hypertension  Blood pressure controlled off antihypertensives.  Vies to continue to monitor blood pressure to return for increased readings.          History of Present Illness  {Disappearing Hyperlinks I Encounters * My Last Note * Since Last Visit * History :70670}  Patient presents to the office for evaluation of surgical wound incision.  Had cardiac pacemaker implanted on 2024.  Incision is to left ACW.  Has observed corner of incision \"keeps scabbing and bleeding.\"  Occurs when clothing is caught on incision.  Patient denies fever, chills, weakness, body aches, SOB, chest pain.        History obtained from : patient  Review of Systems   Constitutional:  Negative for " activity change, appetite change, chills and fever.   Respiratory:  Negative for cough, chest tightness and wheezing.    Cardiovascular:  Negative for chest pain and palpitations.   Gastrointestinal:  Negative for abdominal pain, diarrhea and vomiting.   Genitourinary:  Negative for decreased urine volume.   Musculoskeletal:  Negative for myalgias.   Skin:  Positive for wound.   Neurological:  Negative for dizziness, syncope, weakness, light-headedness and headaches.   Hematological:  Negative for adenopathy.   Psychiatric/Behavioral:  Negative for confusion.      Pertinent Medical History        Medical History Reviewed by provider this encounter:  Tobacco  Allergies  Meds  Med Hx  Surg Hx  Fam Hx  Soc Hx      Past Medical History  Past Medical History:   Diagnosis Date   • Anemia 08/22/2018   • Anxiety    • Arthritis    • AVB (atrioventricular block)     first degree   • Cataract    • CHF (congestive heart failure) ([facility])    • COPD, mild ([facility])    • Coronary artery disease    • Dislocation of right shoulder joint    • Frequent UTI    • GERD (gastroesophageal reflux disease)    • H/O: pneumonia    • Heme positive stool    • Hyperlipidemia    • Hypertension    • Hypothyroidism    • Morbid obesity with BMI of 50.0-59.9, adult ([facility])    • Obesity, morbid ([facility]) 08/22/2018   • JANICE on CPAP    • Pulmonary hypertension ([facility]) 08/22/2018   • Severe aortic stenosis    • Simple goiter    • Skin cyst     within the armpits, right   • Wears glasses      Past Surgical History:   Procedure Laterality Date   • BREAST BIOPSY     • CARDIAC CATHETERIZATION     • CARDIAC ELECTROPHYSIOLOGY PROCEDURE N/A 8/12/2024    Procedure: Cardiac pacer implant;  Surgeon: Clarke Zuluaga MD;  Location: [facility];  Service: Cardiology   • CARPAL TUNNEL RELEASE Bilateral    • CHOLECYSTECTOMY     • DILATION AND CURETTAGE OF UTERUS     • HYSTEROSCOPY     • MASTOID SURGERY     • CT COLONOSCOPY FLX DX W/COLLJ SPEC WHEN PFRMD N/A 9/6/2018     Procedure: COLONOSCOPY;  Surgeon: Shree Sosa III, MD;  Location: [facility];  Service: Gastroenterology   • KY ECHO TRANSESOPHAG R-T 2D W/PRB IMG ACQUISJ I&R N/A 10/9/2018    Procedure: INTRA-OP TRANSESOPHAGEAL ECHOCARDIOGRAM (GARRISON);  Surgeon: Kushal Camarena DO;  Location:  MAIN OR;  Service: Cardiac Surgery   • KY ESOPHAGOGASTRODUODENOSCOPY TRANSORAL DIAGNOSTIC N/A 8/31/2018    Procedure: ESOPHAGOGASTRODUODENOSCOPY (EGD);  Surgeon: Shree Sosa III, MD;  Location: [facility];  Service: Gastroenterology   • KY REPLACE AORTIC VALVE OPENFEMORAL ARTERY APPROACH N/A 10/9/2018    Procedure: REPLACEMENT AORTIC VALVE TRANSCATHETER (TAVR) TRANSFEMORAL W/ 23 MM MENDOZA NOE S3 VALVE (ACCESS OF LEFT);  Surgeon: Kushal Camarena DO;  Location:  MAIN OR;  Service: Cardiac Surgery   • TOTAL HIP ARTHROPLASTY Left 2007   • TOTAL KNEE ARTHROPLASTY Bilateral      Family History   Problem Relation Age of Onset   • Diabetes Mother    • Stroke Mother    • Cancer Father    • Lung cancer Father    • Diabetes Sister    • Heart disease Sister    • Hypertension Sister    • Coronary artery disease Family    • Diabetes Family    • Hypertension Family    • Cancer Family    • Stroke Family    • Thyroid disease Neg Hx      Current Outpatient Medications on File Prior to Visit   Medication Sig Dispense Refill   • acetaminophen (TYLENOL) 500 mg tablet Take 500 mg by mouth every 6 (six) hours as needed     • aspirin (ECOTRIN LOW STRENGTH) 81 mg EC tablet Take 1 tablet (81 mg total) by mouth daily 100 tablet 0   • b complex vitamins capsule Take 1 capsule by mouth 2 (two) times a day       • Blood Glucose Monitoring Suppl (OneTouch Verio Reflect) w/Device KIT Check blood sugars once daily. Please substitute with appropriate alternative as covered by patient's insurance. Dx: E11.65 1 kit 0   • Calcium Carb-Cholecalciferol (CALCIUM 600 + D PO) Take 1 tablet by mouth 2 (two) times a day     • Cranberry 250 MG TABS Take by mouth     •  ferrous sulfate 324 (65 Fe) mg Take 1 tablet every other day. 90 tablet 1   • fluticasone (FLONASE) 50 mcg/act nasal spray 1 spray into each nostril daily 16 g 1   • furosemide (LASIX) 20 mg tablet Take 1 tablet (20 mg total) by mouth daily for 7 days 7 tablet 0   • glucose blood (OneTouch Verio) test strip Check blood sugars once daily. Please substitute with appropriate alternative as covered by patient's insurance. Dx: E11.65 100 each 3   • glucose blood test strip Use 1 each in the morning Use as instructed 100 each 4   • hydrocortisone 2.5 % cream Apply topically 2 (two) times a day 28 g 1   • Lancets (onetouch ultrasoft) lancets Use in the morning Use as instructed 100 each 1   • levothyroxine 50 mcg tablet TAKE 1 TABLET BY MOUTH EVERY  tablet 1   • ofloxacin (OCUFLOX) 0.3 % ophthalmic solution 5 drops twice daily to left ear x 10 days. 10 mL 1   • omeprazole (PriLOSEC) 40 MG capsule TAKE 1 CAPSULE BY MOUTH TWICE A  capsule 1   • OneTouch Delica Lancets 33G MISC Check blood sugars once daily. Please substitute with appropriate alternative as covered by patient's insurance. Dx: E11.65 100 each 3   • oxybutynin (DITROPAN-XL) 5 mg 24 hr tablet TAKE 1 TABLET (5 MG TOTAL) BY MOUTH DAILY. 90 tablet 1   • oxygen gas Inhale 2 L/min continuous. Indications: copd     • pregabalin (LYRICA) 25 mg capsule Take 1 capsule (25 mg total) by mouth 2 (two) times a day 60 capsule 1   • sertraline (ZOLOFT) 100 mg tablet TAKE 1 TABLET BY MOUTH EVERY DAY 90 tablet 1   • simvastatin (ZOCOR) 40 mg tablet TAKE 1 TABLET BY MOUTH EVERYDAY AT BEDTIME 100 tablet 1   • triamcinolone (KENALOG) 0.1 % cream Apply topically 2 (two) times a day 15 g 1   • Turmeric (QC Tumeric Complex) 500 MG CAPS Take by mouth     • warfarin (Coumadin) 2.5 mg tablet Take 1 tablet (2.5mg) Mon-Sat. Take 2 tablets (5mg) on Sun or as directed Do not start before August 9, 2024. (Patient taking differently: Take 2.5 mg by mouth daily) 102 tablet 0   •  [DISCONTINUED] cyclobenzaprine (FLEXERIL) 5 mg tablet Take 1 tablet (5 mg total) by mouth 2 (two) times a day as needed for muscle spasms 30 tablet 0   • [DISCONTINUED] meloxicam (Mobic) 15 mg tablet Take 1 tablet (15 mg total) by mouth daily 7 tablet 0     No current facility-administered medications on file prior to visit.     Allergies   Allergen Reactions   • Latex Rash   • Neosporin [Neomycin-Bacitracin Zn-Polymyx] Rash and Other (See Comments)     hives per [facility] order      Current Outpatient Medications on File Prior to Visit   Medication Sig Dispense Refill   • acetaminophen (TYLENOL) 500 mg tablet Take 500 mg by mouth every 6 (six) hours as needed     • aspirin (ECOTRIN LOW STRENGTH) 81 mg EC tablet Take 1 tablet (81 mg total) by mouth daily 100 tablet 0   • b complex vitamins capsule Take 1 capsule by mouth 2 (two) times a day       • Blood Glucose Monitoring Suppl (OneTouch Verio Reflect) w/Device KIT Check blood sugars once daily. Please substitute with appropriate alternative as covered by patient's insurance. Dx: E11.65 1 kit 0   • Calcium Carb-Cholecalciferol (CALCIUM 600 + D PO) Take 1 tablet by mouth 2 (two) times a day     • Cranberry 250 MG TABS Take by mouth     • ferrous sulfate 324 (65 Fe) mg Take 1 tablet every other day. 90 tablet 1   • fluticasone (FLONASE) 50 mcg/act nasal spray 1 spray into each nostril daily 16 g 1   • furosemide (LASIX) 20 mg tablet Take 1 tablet (20 mg total) by mouth daily for 7 days 7 tablet 0   • glucose blood (OneTouch Verio) test strip Check blood sugars once daily. Please substitute with appropriate alternative as covered by patient's insurance. Dx: E11.65 100 each 3   • glucose blood test strip Use 1 each in the morning Use as instructed 100 each 4   • hydrocortisone 2.5 % cream Apply topically 2 (two) times a day 28 g 1   • Lancets (onetouch ultrasoft) lancets Use in the morning Use as instructed 100 each 1   • levothyroxine 50 mcg tablet TAKE 1 TABLET BY MOUTH  EVERY  tablet 1   • ofloxacin (OCUFLOX) 0.3 % ophthalmic solution 5 drops twice daily to left ear x 10 days. 10 mL 1   • omeprazole (PriLOSEC) 40 MG capsule TAKE 1 CAPSULE BY MOUTH TWICE A  capsule 1   • OneTouch Delica Lancets 33G MISC Check blood sugars once daily. Please substitute with appropriate alternative as covered by patient's insurance. Dx: E11.65 100 each 3   • oxybutynin (DITROPAN-XL) 5 mg 24 hr tablet TAKE 1 TABLET (5 MG TOTAL) BY MOUTH DAILY. 90 tablet 1   • oxygen gas Inhale 2 L/min continuous. Indications: copd     • pregabalin (LYRICA) 25 mg capsule Take 1 capsule (25 mg total) by mouth 2 (two) times a day 60 capsule 1   • sertraline (ZOLOFT) 100 mg tablet TAKE 1 TABLET BY MOUTH EVERY DAY 90 tablet 1   • simvastatin (ZOCOR) 40 mg tablet TAKE 1 TABLET BY MOUTH EVERYDAY AT BEDTIME 100 tablet 1   • triamcinolone (KENALOG) 0.1 % cream Apply topically 2 (two) times a day 15 g 1   • Turmeric (QC Tumeric Complex) 500 MG CAPS Take by mouth     • warfarin (Coumadin) 2.5 mg tablet Take 1 tablet (2.5mg) Mon-Sat. Take 2 tablets (5mg) on Sun or as directed Do not start before August 9, 2024. (Patient taking differently: Take 2.5 mg by mouth daily) 102 tablet 0   • [DISCONTINUED] cyclobenzaprine (FLEXERIL) 5 mg tablet Take 1 tablet (5 mg total) by mouth 2 (two) times a day as needed for muscle spasms 30 tablet 0   • [DISCONTINUED] meloxicam (Mobic) 15 mg tablet Take 1 tablet (15 mg total) by mouth daily 7 tablet 0     No current facility-administered medications on file prior to visit.      Social History     Tobacco Use   • Smoking status: Never     Passive exposure: Never   • Smokeless tobacco: Never   Vaping Use   • Vaping status: Never Used   Substance and Sexual Activity   • Alcohol use: Not Currently   • Drug use: Never   • Sexual activity: Never         Objective  {Disappearing Hyperlinks   Review Vitals * Enter New Vitals * Results Review * Labs * Imaging * Cardiology * Procedures * Lung  "Cancer Screening * Surgical eConsent :04367}  /68 (BP Location: Left arm, Patient Position: Sitting)   Pulse 88   Temp 98.8 °F (37.1 °C)   Ht 4' 6\" (1.372 m)   Wt 80.6 kg (177 lb 9.6 oz)   SpO2 98%   BMI 42.82 kg/m²     Physical Exam  Vitals reviewed.   Constitutional:       General: She is not in acute distress.     Appearance: She is not ill-appearing.   HENT:      Head: Normocephalic and atraumatic.      Right Ear: External ear normal.      Mouth/Throat:      Mouth: Mucous membranes are moist.      Pharynx: Oropharynx is clear.   Neck:      Vascular: No carotid bruit.   Cardiovascular:      Rate and Rhythm: Normal rate and regular rhythm.      Pulses: Normal pulses.      Heart sounds: Normal heart sounds.   Pulmonary:      Effort: Pulmonary effort is normal.      Breath sounds: Normal breath sounds.   Chest:      Comments: Intact incision to left ACW noted.  There is an area of scabbed skin to lateral aspect of incision, small surrounding erythema noted, no drainage, open skin, tenderness noted.  Picture in media  Abdominal:      General: Bowel sounds are normal.      Palpations: Abdomen is soft.      Tenderness: There is no abdominal tenderness.   Musculoskeletal:         General: Normal range of motion.      Cervical back: Normal range of motion and neck supple.   Lymphadenopathy:      Cervical: No cervical adenopathy.   Skin:     General: Skin is warm and dry.   Neurological:      General: No focal deficit present.      Mental Status: She is alert and oriented to person, place, and time.   Psychiatric:         Mood and Affect: Mood normal.         Behavior: Behavior normal.         "

## 2024-09-19 NOTE — PATIENT INSTRUCTIONS
"Patient Education     Caring for a closed surgical wound   The Basics   Written by the doctors and editors at [facility]   What is a surgical wound? -- A surgical wound is a cut that the doctor makes during surgery. It is also called an \"incision.\"  After surgery, the wound is often closed with stitches. Stitches are also called \"sutures.\" They might be on top of the skin, or under the first layer of skin. In some cases, doctors might use special staples or skin glue to close the wound instead. You might also have strips of tape helping to hold the wound closed.  It's important to take care of a surgical wound as it heals. That's because if germs get into the body through the wound, it could cause a serious infection. The medical term for this is \"surgical site infection.\"  You might need a family member or friend to help you care for your surgical wound. Some people have a home health nurse come to their home to help with this.  How do I care for myself at home? -- Ask the doctor or nurse what you should do when you go home. Make sure that you understand exactly what you need to do to care for yourself. Ask questions if there is anything you do not understand.  You should also:   Keep your wound covered as it heals, if you were told to do this. This will help protect it from germs that could cause infection.   Take all of your medicines as instructed.   You can gently wash around the wound with soap and water after 48 hours, and let water run over the wound. Pat dry, and put on a clean dressing. If your doctor gave you different instructions, follow them.   Follow your doctor's instructions for when and how to change your dressing.   Always wash your hands before and after touching the wound or dressing.   Each time you change the dressing, look closely at the wound to check that it is healing. Look for any signs of infection. These might include skin color changes, swelling, or drainage from the wound.   Avoid " picking the scab or scratching the area. If you have small strips of tape helping to keep the wound closed, do not remove them. They will fall off on their own, or your doctor will carefully remove them. You can trim the ends of the strips that curl up if they bother you or catch on your clothing.   Do not soak in water or swim until your wound has healed.   Avoid smoking. If you smoke, it can take longer for your wound to heal.   Avoid activities or sports that could hurt your wound or pull on your stitches while it is healing. Your doctor or nurse will tell you when you can do these things again.  What follow-up care do I need? -- Your doctor or nurse will tell you if you need to make a follow-up appointment. Make sure that you know what you need to do and when and where to go.  If you have stitches on the outside or skin staples, you will need to get them removed. Sometimes, stitches are placed under the skin, where you don't see them. These usually do not need to be removed. In most cases, they will absorb on their own.  When should I call the doctor? -- Call your doctor or nurse if you have any signs of an infection. These might include:   Changes in skin color, swelling, warmth, or increased pain around the wound   Any fluid draining from the wound, including pus, blood, or watery fluid, especially if it has a bad smell   Streaks on the skin going away from the wound, or streaks going up your arm or leg   Fever  You should also call if the wound:   Starts to hurt more   Opens up  All topics are updated as new evidence becomes available and our peer review process is complete.  This topic retrieved from [employer] on: May 02, 2024.  Topic 066492 Version 3.0  Release: 32.4.3 - C32.122  © 2024 UpToDate, Inc. and/or its affiliates. All rights reserved.  Consumer Information Use and Disclaimer   Disclaimer: This generalized information is a limited summary of diagnosis, treatment, and/or medication information. It is  not meant to be comprehensive and should be used as a tool to help the user understand and/or assess potential diagnostic and treatment options. It does NOT include all information about conditions, treatments, medications, side effects, or risks that may apply to a specific patient. It is not intended to be medical advice or a substitute for the medical advice, diagnosis, or treatment of a health care provider based on the health care provider's examination and assessment of a patient's specific and unique circumstances. Patients must speak with a health care provider for complete information about their health, medical questions, and treatment options, including any risks or benefits regarding use of medications. This information does not endorse any treatments or medications as safe, effective, or approved for treating a specific patient. UpToDate, Inc. and its affiliates disclaim any warranty or liability relating to this information or the use thereof.The use of this information is governed by the Terms of Use, available at https://www.[employer].com/en/know/clinical-effectiveness-terms. 2024© UpToDate, Inc. and its affiliates and/or licensors. All rights reserved.  Copyright   © 2024 UpToDate, Inc. and/or its affiliates. All rights reserved.

## 2024-09-19 NOTE — PROGRESS NOTES
"Ambulatory Visit  Name: Marisol Otoole      : 1940      MRN: [id number]  Encounter Provider: MABEL Richard  Encounter Date: 2024   Encounter department: [facility] [address]    Assessment & Plan  Superficial incisional infection of surgical site  Advised frequent hand hygiene, wear loose clothing to avoid incision clinging to clothes.  To apply Bactroban as prescribed.    Orders:    mupirocin (BACTROBAN) 2 % ointment; Apply topically 2 (two) times a day    Cardiac pacemaker  No drainage or tenderness noted at incision site.  Denies chest pain, weakness, SOB, syncope.  To continue follow-up with cardiology as scheduled.       Primary hypertension  Blood pressure controlled off antihypertensives.  Vies to continue to monitor blood pressure to return for increased readings.          History of Present Illness     Patient presents to the office for evaluation of surgical wound incision.  Had cardiac pacemaker implanted on 2024.  Incision is to left ACW.  Has observed corner of incision \"keeps scabbing and bleeding.\"  Occurs when clothing is caught on incision.  Patient denies fever, chills, weakness, body aches, SOB, chest pain.        History obtained from : patient  Review of Systems   Constitutional:  Negative for activity change, appetite change, chills and fever.   Respiratory:  Negative for cough, chest tightness and wheezing.    Cardiovascular:  Negative for chest pain and palpitations.   Gastrointestinal:  Negative for abdominal pain, diarrhea and vomiting.   Genitourinary:  Negative for decreased urine volume.   Musculoskeletal:  Negative for myalgias.   Skin:  Positive for wound.   Neurological:  Negative for dizziness, syncope, weakness, light-headedness and headaches.   Hematological:  Negative for adenopathy.   Psychiatric/Behavioral:  Negative for confusion.      Pertinent Medical History         Medical History Reviewed by provider this " encounter:  Tobacco  Allergies  Meds  Med Hx  Surg Hx  Fam Hx  Soc Hx      Past Medical History   Past Medical History:   Diagnosis Date    Anemia 08/22/2018    Anxiety     Arthritis     AVB (atrioventricular block)     first degree    Cataract     CHF (congestive heart failure) (HCC)     COPD, mild (HCC)     Coronary artery disease     Dislocation of right shoulder joint     Frequent UTI     GERD (gastroesophageal reflux disease)     H/O: pneumonia     Heme positive stool     Hyperlipidemia     Hypertension     Hypothyroidism     Morbid obesity with BMI of 50.0-59.9, adult (HCC)     Obesity, morbid (HCC) 08/22/2018    JANICE on CPAP     Pulmonary hypertension ([facility]) 08/22/2018    Severe aortic stenosis     Simple goiter     Skin cyst     within the armpits, right    Wears glasses      Past Surgical History:   Procedure Laterality Date    BREAST BIOPSY      CARDIAC CATHETERIZATION      CARDIAC ELECTROPHYSIOLOGY PROCEDURE N/A 8/12/2024    Procedure: Cardiac pacer implant;  Surgeon: Clarke Zuluaga MD;  Location:  CARDIAC CATH LAB;  Service: Cardiology    CARPAL TUNNEL RELEASE Bilateral     CHOLECYSTECTOMY      DILATION AND CURETTAGE OF UTERUS      HYSTEROSCOPY      MASTOID SURGERY      SD COLONOSCOPY FLX DX W/COLLJ SPEC WHEN PFRMD N/A 9/6/2018    Procedure: COLONOSCOPY;  Surgeon: Shree Sosa III, MD;  Location: [facility];  Service: Gastroenterology    SD ECHO TRANSESOPHAG R-T 2D W/PRB IMG ACQUISJ I&R N/A 10/9/2018    Procedure: INTRA-OP TRANSESOPHAGEAL ECHOCARDIOGRAM (GARRISON);  Surgeon: Kushal Camarena DO;  Location:  MAIN OR;  Service: Cardiac Surgery    SD ESOPHAGOGASTRODUODENOSCOPY TRANSORAL DIAGNOSTIC N/A 8/31/2018    Procedure: ESOPHAGOGASTRODUODENOSCOPY (EGD);  Surgeon: Shree Sosa III, MD;  Location: [facility];  Service: Gastroenterology    SD REPLACE AORTIC VALVE OPENFEMORAL ARTERY APPROACH N/A 10/9/2018    Procedure: REPLACEMENT AORTIC VALVE TRANSCATHETER (TAVR) TRANSFEMORAL W/ 23 MM MENDOZA  NOE S3 VALVE (ACCESS OF LEFT);  Surgeon: Kushal Camarena DO;  Location: [facility];  Service: Cardiac Surgery    TOTAL HIP ARTHROPLASTY Left 2007    TOTAL KNEE ARTHROPLASTY Bilateral      Family History   Problem Relation Age of Onset    Diabetes Mother     Stroke Mother     Cancer Father     Lung cancer Father     Diabetes Sister     Heart disease Sister     Hypertension Sister     Coronary artery disease Family     Diabetes Family     Hypertension Family     Cancer Family     Stroke Family     Thyroid disease Neg Hx      Current Outpatient Medications on File Prior to Visit   Medication Sig Dispense Refill    acetaminophen (TYLENOL) 500 mg tablet Take 500 mg by mouth every 6 (six) hours as needed      aspirin (ECOTRIN LOW STRENGTH) 81 mg EC tablet Take 1 tablet (81 mg total) by mouth daily 100 tablet 0    b complex vitamins capsule Take 1 capsule by mouth 2 (two) times a day        Blood Glucose Monitoring Suppl (OneTouch Verio Reflect) w/Device KIT Check blood sugars once daily. Please substitute with appropriate alternative as covered by patient's insurance. Dx: E11.65 1 kit 0    Calcium Carb-Cholecalciferol (CALCIUM 600 + D PO) Take 1 tablet by mouth 2 (two) times a day      Cranberry 250 MG TABS Take by mouth      ferrous sulfate 324 (65 Fe) mg Take 1 tablet every other day. 90 tablet 1    fluticasone (FLONASE) 50 mcg/act nasal spray 1 spray into each nostril daily 16 g 1    furosemide (LASIX) 20 mg tablet Take 1 tablet (20 mg total) by mouth daily for 7 days 7 tablet 0    glucose blood (OneTouch Verio) test strip Check blood sugars once daily. Please substitute with appropriate alternative as covered by patient's insurance. Dx: E11.65 100 each 3    glucose blood test strip Use 1 each in the morning Use as instructed 100 each 4    hydrocortisone 2.5 % cream Apply topically 2 (two) times a day 28 g 1    Lancets (onetouch ultrasoft) lancets Use in the morning Use as instructed 100 each 1    levothyroxine  50 mcg tablet TAKE 1 TABLET BY MOUTH EVERY  tablet 1    ofloxacin (OCUFLOX) 0.3 % ophthalmic solution 5 drops twice daily to left ear x 10 days. 10 mL 1    omeprazole (PriLOSEC) 40 MG capsule TAKE 1 CAPSULE BY MOUTH TWICE A  capsule 1    OneTouch Delica Lancets 33G MISC Check blood sugars once daily. Please substitute with appropriate alternative as covered by patient's insurance. Dx: E11.65 100 each 3    oxybutynin (DITROPAN-XL) 5 mg 24 hr tablet TAKE 1 TABLET (5 MG TOTAL) BY MOUTH DAILY. 90 tablet 1    oxygen gas Inhale 2 L/min continuous. Indications: copd      pregabalin (LYRICA) 25 mg capsule Take 1 capsule (25 mg total) by mouth 2 (two) times a day 60 capsule 1    sertraline (ZOLOFT) 100 mg tablet TAKE 1 TABLET BY MOUTH EVERY DAY 90 tablet 1    simvastatin (ZOCOR) 40 mg tablet TAKE 1 TABLET BY MOUTH EVERYDAY AT BEDTIME 100 tablet 1    triamcinolone (KENALOG) 0.1 % cream Apply topically 2 (two) times a day 15 g 1    Turmeric (QC Tumeric Complex) 500 MG CAPS Take by mouth      warfarin (Coumadin) 2.5 mg tablet Take 1 tablet (2.5mg) Mon-Sat. Take 2 tablets (5mg) on Sun or as directed Do not start before August 9, 2024. (Patient taking differently: Take 2.5 mg by mouth daily) 102 tablet 0    [DISCONTINUED] cyclobenzaprine (FLEXERIL) 5 mg tablet Take 1 tablet (5 mg total) by mouth 2 (two) times a day as needed for muscle spasms 30 tablet 0    [DISCONTINUED] meloxicam (Mobic) 15 mg tablet Take 1 tablet (15 mg total) by mouth daily 7 tablet 0     No current facility-administered medications on file prior to visit.     Allergies   Allergen Reactions    Latex Rash    Neosporin [Neomycin-Bacitracin Zn-Polymyx] Rash and Other (See Comments)     hives per [facility] order      Current Outpatient Medications on File Prior to Visit   Medication Sig Dispense Refill    acetaminophen (TYLENOL) 500 mg tablet Take 500 mg by mouth every 6 (six) hours as needed      aspirin (ECOTRIN LOW STRENGTH) 81 mg EC tablet Take 1  tablet (81 mg total) by mouth daily 100 tablet 0    b complex vitamins capsule Take 1 capsule by mouth 2 (two) times a day        Blood Glucose Monitoring Suppl (OneTouch Verio Reflect) w/Device KIT Check blood sugars once daily. Please substitute with appropriate alternative as covered by patient's insurance. Dx: E11.65 1 kit 0    Calcium Carb-Cholecalciferol (CALCIUM 600 + D PO) Take 1 tablet by mouth 2 (two) times a day      Cranberry 250 MG TABS Take by mouth      ferrous sulfate 324 (65 Fe) mg Take 1 tablet every other day. 90 tablet 1    fluticasone (FLONASE) 50 mcg/act nasal spray 1 spray into each nostril daily 16 g 1    furosemide (LASIX) 20 mg tablet Take 1 tablet (20 mg total) by mouth daily for 7 days 7 tablet 0    glucose blood (OneTouch Verio) test strip Check blood sugars once daily. Please substitute with appropriate alternative as covered by patient's insurance. Dx: E11.65 100 each 3    glucose blood test strip Use 1 each in the morning Use as instructed 100 each 4    hydrocortisone 2.5 % cream Apply topically 2 (two) times a day 28 g 1    Lancets (onetouch ultrasoft) lancets Use in the morning Use as instructed 100 each 1    levothyroxine 50 mcg tablet TAKE 1 TABLET BY MOUTH EVERY  tablet 1    ofloxacin (OCUFLOX) 0.3 % ophthalmic solution 5 drops twice daily to left ear x 10 days. 10 mL 1    omeprazole (PriLOSEC) 40 MG capsule TAKE 1 CAPSULE BY MOUTH TWICE A  capsule 1    OneTouch Delica Lancets 33G MISC Check blood sugars once daily. Please substitute with appropriate alternative as covered by patient's insurance. Dx: E11.65 100 each 3    oxybutynin (DITROPAN-XL) 5 mg 24 hr tablet TAKE 1 TABLET (5 MG TOTAL) BY MOUTH DAILY. 90 tablet 1    oxygen gas Inhale 2 L/min continuous. Indications: copd      pregabalin (LYRICA) 25 mg capsule Take 1 capsule (25 mg total) by mouth 2 (two) times a day 60 capsule 1    sertraline (ZOLOFT) 100 mg tablet TAKE 1 TABLET BY MOUTH EVERY DAY 90 tablet 1     "simvastatin (ZOCOR) 40 mg tablet TAKE 1 TABLET BY MOUTH EVERYDAY AT BEDTIME 100 tablet 1    triamcinolone (KENALOG) 0.1 % cream Apply topically 2 (two) times a day 15 g 1    Turmeric (QC Tumeric Complex) 500 MG CAPS Take by mouth      warfarin (Coumadin) 2.5 mg tablet Take 1 tablet (2.5mg) Mon-Sat. Take 2 tablets (5mg) on Sun or as directed Do not start before August 9, 2024. (Patient taking differently: Take 2.5 mg by mouth daily) 102 tablet 0    [DISCONTINUED] cyclobenzaprine (FLEXERIL) 5 mg tablet Take 1 tablet (5 mg total) by mouth 2 (two) times a day as needed for muscle spasms 30 tablet 0    [DISCONTINUED] meloxicam (Mobic) 15 mg tablet Take 1 tablet (15 mg total) by mouth daily 7 tablet 0     No current facility-administered medications on file prior to visit.      Social History     Tobacco Use    Smoking status: Never     Passive exposure: Never    Smokeless tobacco: Never   Vaping Use    Vaping status: Never Used   Substance and Sexual Activity    Alcohol use: Not Currently    Drug use: Never    Sexual activity: Never         Objective     /68 (BP Location: Left arm, Patient Position: Sitting)   Pulse 88   Temp 98.8 °F (37.1 °C)   Ht 4' 6\" (1.372 m)   Wt 80.6 kg (177 lb 9.6 oz)   SpO2 98%   BMI 42.82 kg/m²     Physical Exam  Vitals reviewed.   Constitutional:       General: She is not in acute distress.     Appearance: She is not ill-appearing.   HENT:      Head: Normocephalic and atraumatic.      Right Ear: External ear normal.      Mouth/Throat:      Mouth: Mucous membranes are moist.      Pharynx: Oropharynx is clear.   Neck:      Vascular: No carotid bruit.   Cardiovascular:      Rate and Rhythm: Normal rate and regular rhythm.      Pulses: Normal pulses.      Heart sounds: Normal heart sounds.   Pulmonary:      Effort: Pulmonary effort is normal.      Breath sounds: Normal breath sounds.   Chest:      Comments: Intact incision to left ACW noted.  There is an area of scabbed skin to lateral " aspect of incision, small surrounding erythema noted, no drainage, open skin, tenderness noted.  Picture in media  Abdominal:      General: Bowel sounds are normal.      Palpations: Abdomen is soft.      Tenderness: There is no abdominal tenderness.   Musculoskeletal:         General: Normal range of motion.      Cervical back: Normal range of motion and neck supple.   Lymphadenopathy:      Cervical: No cervical adenopathy.   Skin:     General: Skin is warm and dry.   Neurological:      General: No focal deficit present.      Mental Status: She is alert and oriented to person, place, and time.   Psychiatric:         Mood and Affect: Mood normal.         Behavior: Behavior normal.

## 2024-09-19 NOTE — ASSESSMENT & PLAN NOTE
Blood pressure controlled off antihypertensives.  Vies to continue to monitor blood pressure to return for increased readings.

## 2024-09-29 DIAGNOSIS — F41.8 DEPRESSION WITH ANXIETY: ICD-10-CM

## 2024-09-29 RX ORDER — SERTRALINE HYDROCHLORIDE 100 MG/1
TABLET, FILM COATED ORAL
Qty: 90 TABLET | Refills: 1 | Status: SHIPPED | OUTPATIENT
Start: 2024-09-29

## 2024-09-30 ENCOUNTER — TELEPHONE (OUTPATIENT)
Dept: FAMILY MEDICINE CLINIC | Facility: CLINIC | Age: 84
End: 2024-09-30

## 2024-09-30 NOTE — TELEPHONE ENCOUNTER
Talked to Diya and she stated that when she looked at the incision it was fine and to continue the mupirocin ointment. Talked to Adrienne and she is aware to let patient know

## 2024-09-30 NOTE — TELEPHONE ENCOUNTER
Adrienne from home health called stating patient has a scab on lateral side of chest from incision. Scab looks really dark, not smooth, it is rough. There is also a space in the middle and it is not draining.     She used mupirocin only 2-3 times and she put a bandage over the ointment. The scab bled after taking off the bandage. Adrienne said it looks like the scab came off and then grew back again.    All her vitals are okay. Adrienne was wondering if she continues with the ointment or should something else be done?

## 2024-10-03 ENCOUNTER — TELEPHONE (OUTPATIENT)
Dept: LAB | Facility: HOSPITAL | Age: 84
End: 2024-10-03

## 2024-10-03 ENCOUNTER — TELEPHONE (OUTPATIENT)
Dept: CARDIOLOGY CLINIC | Facility: CLINIC | Age: 84
End: 2024-10-03

## 2024-10-03 ENCOUNTER — ANTICOAG VISIT (OUTPATIENT)
Dept: CARDIOLOGY CLINIC | Facility: CLINIC | Age: 84
End: 2024-10-03

## 2024-10-03 ENCOUNTER — APPOINTMENT (OUTPATIENT)
Dept: LAB | Facility: HOSPITAL | Age: 84
End: 2024-10-03
Payer: MEDICARE

## 2024-10-03 NOTE — TELEPHONE ENCOUNTER
Hi,  Requesting a pt/inr home draw to be done around 10/17. Please call the pt to set up. Script is in the chart.     Thank you.

## 2024-10-10 ENCOUNTER — TELEPHONE (OUTPATIENT)
Age: 84
End: 2024-10-10

## 2024-10-10 NOTE — TELEPHONE ENCOUNTER
Adrienne for residential home health care calling   Patients right ankle still in lots of pain. Suggesting for her to see an orthopedics or podiatrist for better shoes   
Adrienne from [facility] is aware, she stated she will also relay the message to the patient.   
no

## 2024-10-11 ENCOUNTER — CLINICAL SUPPORT (OUTPATIENT)
Dept: CARDIOLOGY CLINIC | Facility: CLINIC | Age: 84
End: 2024-10-11
Payer: MEDICARE

## 2024-10-11 DIAGNOSIS — R00.1 BRADYCARDIA: ICD-10-CM

## 2024-10-11 PROCEDURE — 93228 REMOTE 30 DAY ECG REV/REPORT: CPT | Performed by: INTERNAL MEDICINE

## 2024-10-15 ENCOUNTER — TELEPHONE (OUTPATIENT)
Dept: CARDIOLOGY CLINIC | Facility: CLINIC | Age: 84
End: 2024-10-15

## 2024-10-15 NOTE — TELEPHONE ENCOUNTER
----- Message from Bro Esteves MD sent at 10/15/2024  9:13 AM EDT -----  Patient monitored for 13h 28m   9 events were transmitted. 1 patient triggered; 8 auto triggered   Pauses >2 seconds occurred 3 time(s) with longest pause 3.6 seconds   Heart Block occurred 5 time(s) the most severe 1°; slowest 28 BPM   953 PACs with PAC burden of 2%   PVCs were not found during the monitoring period    Personally reviewed.  Agreed with the findings with the following comments:  Sinus rhythm with sinus bradycardia.    Slowest heart rate 28 bpm at 8/8/2024 at 22:17  No evidence of atrial fibrillation, SVT, VT  Patient's 1 complaint of feeling dizzy had underlying junctional rhythm offset into sinus bradycardia with PAC(s) and junctional escape beat(s)    Recommendations:  Patient's pacemaker needs adjustment

## 2024-10-17 ENCOUNTER — LAB (OUTPATIENT)
Dept: LAB | Facility: HOSPITAL | Age: 84
End: 2024-10-17
Attending: INTERNAL MEDICINE
Payer: MEDICARE

## 2024-10-17 ENCOUNTER — TELEPHONE (OUTPATIENT)
Dept: CARDIOLOGY CLINIC | Facility: CLINIC | Age: 84
End: 2024-10-17

## 2024-10-17 ENCOUNTER — ANTICOAG VISIT (OUTPATIENT)
Dept: CARDIOLOGY CLINIC | Facility: CLINIC | Age: 84
End: 2024-10-17

## 2024-10-17 ENCOUNTER — RA CDI HCC (OUTPATIENT)
Dept: OTHER | Facility: HOSPITAL | Age: 84
End: 2024-10-17

## 2024-10-17 DIAGNOSIS — I10 PRIMARY HYPERTENSION: ICD-10-CM

## 2024-10-17 DIAGNOSIS — Z79.01 LONG TERM (CURRENT) USE OF ANTICOAGULANTS: ICD-10-CM

## 2024-10-17 DIAGNOSIS — D50.8 IRON DEFICIENCY ANEMIA SECONDARY TO INADEQUATE DIETARY IRON INTAKE: ICD-10-CM

## 2024-10-17 LAB
ALBUMIN SERPL BCG-MCNC: 3.4 G/DL (ref 3.5–5)
ALP SERPL-CCNC: 75 U/L (ref 34–104)
ALT SERPL W P-5'-P-CCNC: 15 U/L (ref 7–52)
ANION GAP SERPL CALCULATED.3IONS-SCNC: 9 MMOL/L (ref 4–13)
AST SERPL W P-5'-P-CCNC: 21 U/L (ref 13–39)
BASOPHILS # BLD AUTO: 0.03 THOUSANDS/ΜL (ref 0–0.1)
BASOPHILS NFR BLD AUTO: 0 % (ref 0–1)
BILIRUB SERPL-MCNC: 0.38 MG/DL (ref 0.2–1)
BUN SERPL-MCNC: 24 MG/DL (ref 5–25)
CALCIUM ALBUM COR SERPL-MCNC: 10.2 MG/DL (ref 8.3–10.1)
CALCIUM SERPL-MCNC: 9.7 MG/DL (ref 8.4–10.2)
CHLORIDE SERPL-SCNC: 101 MMOL/L (ref 96–108)
CO2 SERPL-SCNC: 28 MMOL/L (ref 21–32)
CREAT SERPL-MCNC: 0.83 MG/DL (ref 0.6–1.3)
EOSINOPHIL # BLD AUTO: 0.6 THOUSAND/ΜL (ref 0–0.61)
EOSINOPHIL NFR BLD AUTO: 7 % (ref 0–6)
ERYTHROCYTE [DISTWIDTH] IN BLOOD BY AUTOMATED COUNT: 16.6 % (ref 11.6–15.1)
FERRITIN SERPL-MCNC: 39 NG/ML (ref 11–307)
GFR SERPL CREATININE-BSD FRML MDRD: 64 ML/MIN/1.73SQ M
GLUCOSE SERPL-MCNC: 147 MG/DL (ref 65–140)
HCT VFR BLD AUTO: 36.1 % (ref 34.8–46.1)
HGB BLD-MCNC: 10.6 G/DL (ref 11.5–15.4)
IMM GRANULOCYTES # BLD AUTO: 0.04 THOUSAND/UL (ref 0–0.2)
IMM GRANULOCYTES NFR BLD AUTO: 0 % (ref 0–2)
INR PPP: 2.24 (ref 0.85–1.19)
IRON SATN MFR SERPL: 7 % (ref 15–50)
IRON SERPL-MCNC: 19 UG/DL (ref 50–212)
LYMPHOCYTES # BLD AUTO: 1.65 THOUSANDS/ΜL (ref 0.6–4.47)
LYMPHOCYTES NFR BLD AUTO: 18 % (ref 14–44)
MCH RBC QN AUTO: 26 PG (ref 26.8–34.3)
MCHC RBC AUTO-ENTMCNC: 29.4 G/DL (ref 31.4–37.4)
MCV RBC AUTO: 89 FL (ref 82–98)
MONOCYTES # BLD AUTO: 0.87 THOUSAND/ΜL (ref 0.17–1.22)
MONOCYTES NFR BLD AUTO: 10 % (ref 4–12)
NEUTROPHILS # BLD AUTO: 5.8 THOUSANDS/ΜL (ref 1.85–7.62)
NEUTS SEG NFR BLD AUTO: 65 % (ref 43–75)
NRBC BLD AUTO-RTO: 0 /100 WBCS
PLATELET # BLD AUTO: 397 THOUSANDS/UL (ref 149–390)
PMV BLD AUTO: 9.3 FL (ref 8.9–12.7)
POTASSIUM SERPL-SCNC: 3.9 MMOL/L (ref 3.5–5.3)
PROT SERPL-MCNC: 6.9 G/DL (ref 6.4–8.4)
PROTHROMBIN TIME: 25.5 SECONDS (ref 12.3–15)
RBC # BLD AUTO: 4.07 MILLION/UL (ref 3.81–5.12)
SODIUM SERPL-SCNC: 138 MMOL/L (ref 135–147)
TIBC SERPL-MCNC: 280 UG/DL (ref 250–450)
UIBC SERPL-MCNC: 261 UG/DL (ref 155–355)
WBC # BLD AUTO: 8.99 THOUSAND/UL (ref 4.31–10.16)

## 2024-10-17 PROCEDURE — 85025 COMPLETE CBC W/AUTO DIFF WBC: CPT

## 2024-10-17 PROCEDURE — 83550 IRON BINDING TEST: CPT

## 2024-10-17 PROCEDURE — 80053 COMPREHEN METABOLIC PANEL: CPT

## 2024-10-17 PROCEDURE — 82728 ASSAY OF FERRITIN: CPT

## 2024-10-17 PROCEDURE — 85610 PROTHROMBIN TIME: CPT

## 2024-10-17 PROCEDURE — 36415 COLL VENOUS BLD VENIPUNCTURE: CPT

## 2024-10-17 PROCEDURE — 83540 ASSAY OF IRON: CPT

## 2024-10-17 NOTE — TELEPHONE ENCOUNTER
Hi,  Requesting a pt/inr home draw to be done around 11/14. Please call the pt to set up. Script is in the chart.     Thank you.

## 2024-10-24 ENCOUNTER — OFFICE VISIT (OUTPATIENT)
Dept: FAMILY MEDICINE CLINIC | Facility: CLINIC | Age: 84
End: 2024-10-24
Payer: MEDICARE

## 2024-10-24 VITALS
WEIGHT: 176 LBS | HEART RATE: 60 BPM | SYSTOLIC BLOOD PRESSURE: 110 MMHG | OXYGEN SATURATION: 90 % | DIASTOLIC BLOOD PRESSURE: 80 MMHG | HEIGHT: 55 IN | BODY MASS INDEX: 40.73 KG/M2

## 2024-10-24 DIAGNOSIS — E03.9 ACQUIRED HYPOTHYROIDISM: ICD-10-CM

## 2024-10-24 DIAGNOSIS — I48.0 PAROXYSMAL ATRIAL FIBRILLATION (HCC): ICD-10-CM

## 2024-10-24 DIAGNOSIS — I10 PRIMARY HYPERTENSION: ICD-10-CM

## 2024-10-24 DIAGNOSIS — D50.8 IRON DEFICIENCY ANEMIA SECONDARY TO INADEQUATE DIETARY IRON INTAKE: ICD-10-CM

## 2024-10-24 DIAGNOSIS — E66.01 MORBID OBESITY DUE TO EXCESS CALORIES (HCC): ICD-10-CM

## 2024-10-24 DIAGNOSIS — J40 BRONCHITIS: ICD-10-CM

## 2024-10-24 DIAGNOSIS — Z00.00 HEALTHCARE MAINTENANCE: ICD-10-CM

## 2024-10-24 DIAGNOSIS — Z00.00 MEDICARE ANNUAL WELLNESS VISIT, SUBSEQUENT: Primary | ICD-10-CM

## 2024-10-24 DIAGNOSIS — F33.9 DEPRESSION, RECURRENT (HCC): ICD-10-CM

## 2024-10-24 DIAGNOSIS — I50.32 CHRONIC HEART FAILURE WITH PRESERVED EJECTION FRACTION (HFPEF) (HCC): ICD-10-CM

## 2024-10-24 PROBLEM — J96.11 CHRONIC HYPOXEMIC RESPIRATORY FAILURE (HCC): Status: RESOLVED | Noted: 2021-06-10 | Resolved: 2024-10-24

## 2024-10-24 PROBLEM — I50.31 ACUTE DIASTOLIC CONGESTIVE HEART FAILURE (HCC): Status: RESOLVED | Noted: 2018-08-14 | Resolved: 2024-10-24

## 2024-10-24 PROCEDURE — G0439 PPPS, SUBSEQ VISIT: HCPCS | Performed by: NURSE PRACTITIONER

## 2024-10-24 PROCEDURE — 99214 OFFICE O/P EST MOD 30 MIN: CPT | Performed by: NURSE PRACTITIONER

## 2024-10-24 RX ORDER — FERROUS SULFATE 324(65)MG
324 TABLET, DELAYED RELEASE (ENTERIC COATED) ORAL
Start: 2024-10-24

## 2024-10-24 RX ORDER — PREDNISONE 20 MG/1
20 TABLET ORAL DAILY
Qty: 5 TABLET | Refills: 0 | Status: SHIPPED | OUTPATIENT
Start: 2024-10-24 | End: 2024-10-29

## 2024-10-24 RX ORDER — AZITHROMYCIN 250 MG/1
TABLET, FILM COATED ORAL
Qty: 6 TABLET | Refills: 0 | Status: SHIPPED | OUTPATIENT
Start: 2024-10-24 | End: 2024-10-29

## 2024-10-24 RX ORDER — DEXTROMETHORPHAN HYDROBROMIDE AND PROMETHAZINE HYDROCHLORIDE 15; 6.25 MG/5ML; MG/5ML
2.5 SYRUP ORAL 4 TIMES DAILY PRN
Qty: 180 ML | Refills: 0 | Status: SHIPPED | OUTPATIENT
Start: 2024-10-24

## 2024-10-24 NOTE — ASSESSMENT & PLAN NOTE
Orders:    ferrous sulfate 324 (65 Fe) mg; Take 1 tablet (324 mg total) by mouth daily before breakfast Take 1 tablet every other day.    Iron Panel (Includes Ferritin, Iron Sat%, Iron, and TIBC); Future

## 2024-10-24 NOTE — ASSESSMENT & PLAN NOTE
Wt Readings from Last 3 Encounters:   10/24/24 79.8 kg (176 lb)   09/19/24 80.6 kg (177 lb 9.6 oz)   09/12/24 80.3 kg (177 lb)       Continue care with cardiology.

## 2024-10-24 NOTE — PATIENT INSTRUCTIONS

## 2024-10-24 NOTE — ASSESSMENT & PLAN NOTE
Depression Screening Follow-up Plan: Patient's depression screening was positive with a PHQ-9 score of 5. Patient with underlying depression and was advised to continue current medications as prescribed.

## 2024-10-24 NOTE — PROGRESS NOTES
Ambulatory Visit  Name: Marisol Otoole      : 1940      MRN: [id number]  Encounter Provider: MABEL Hallman  Encounter Date: 10/24/2024   Encounter department: [facility] [address]    Assessment & Plan  Medicare annual wellness visit, subsequent         Iron deficiency anemia secondary to inadequate dietary iron intake    Orders:  •  ferrous sulfate 324 (65 Fe) mg; Take 1 tablet (324 mg total) by mouth daily before breakfast Take 1 tablet every other day.  •  Iron Panel (Includes Ferritin, Iron Sat%, Iron, and TIBC); Future    Bronchitis  Will treat with azithromycin, prednisone burst and Phenergan as needed.  Orders:  •  azithromycin (Zithromax) 250 mg tablet; Take 2 tablets (500 mg total) by mouth daily for 1 day, THEN 1 tablet (250 mg total) daily for 4 days.  •  predniSONE 20 mg tablet; Take 1 tablet (20 mg total) by mouth daily for 5 days  •  promethazine-dextromethorphan (PHENERGAN-DM) 6.25-15 mg/5 mL oral syrup; Take 2.5 mL by mouth 4 (four) times a day as needed for cough    Healthcare maintenance    Orders:  •  CBC and differential; Future  •  Comprehensive metabolic panel; Future  •  TSH, 3rd generation with Free T4 reflex; Future  •  Lipid panel; Future    Chronic heart failure with preserved ejection fraction (HFpEF) (HCC)  Wt Readings from Last 3 Encounters:   10/24/24 79.8 kg (176 lb)   24 80.6 kg (177 lb 9.6 oz)   24 80.3 kg (177 lb)       Continue care with cardiology.             Paroxysmal atrial fibrillation (HCC)  Continue care with cardiology.  Continue on Coumadin.       Primary hypertension  Blood pressure is well-managed with current medications.       Acquired hypothyroidism  Obtain labs prior to next visit.  Continue levothyroxine 50 mcg daily.       Depression, recurrent (HCC)  Depression Screening Follow-up Plan: Patient's depression screening was positive with a PHQ-9 score of 5. Patient with underlying depression and was  advised to continue current medications as prescribed.         Morbid obesity due to excess calories (HCC)  Advised weight loss with diet.            Depression Screening and Follow-up Plan: Patient's depression screening was positive with a PHQ-9 score of 5. Patient with underlying depression and was advised to continue current medications as prescribed.     Preventive health issues were discussed with patient, and age appropriate screening tests were ordered as noted in patient's After Visit Summary. Personalized health advice and appropriate referrals for health education or preventive services given if needed, as noted in patient's After Visit Summary.    History of Present Illness     Patient presents for awv and cough. This started 5 days ago. She tried cough medicine and it helped a little.   Concerned with right ankle pain, using a brace and doing PT at home.     Cough  Associated symptoms include headaches. Pertinent negatives include no chest pain, chills, ear pain, fever, rash, rhinorrhea, sore throat, shortness of breath or wheezing.      Patient Care Team:  MABEL Hallman as PCP - General (Family Medicine)  Shree Sosa III, MD as Endoscopist    Review of Systems   Constitutional:  Negative for chills and fever.   HENT:  Negative for congestion, ear pain, rhinorrhea, sinus pain and sore throat.    Eyes:  Negative for pain and visual disturbance.   Respiratory:  Positive for cough. Negative for chest tightness, shortness of breath and wheezing.    Cardiovascular:  Negative for chest pain and palpitations.   Gastrointestinal:  Negative for abdominal pain and vomiting.   Genitourinary:  Negative for dysuria and hematuria.   Musculoskeletal:  Positive for arthralgias. Negative for back pain.   Skin:  Negative for color change and rash.   Neurological:  Positive for headaches. Negative for seizures and syncope.   Psychiatric/Behavioral:  Positive for dysphoric mood. Negative for sleep disturbance. The  patient is nervous/anxious.    All other systems reviewed and are negative.    Medical History Reviewed by provider this encounter:  Meds       Annual Wellness Visit Questionnaire   Marisol is here for her Subsequent Wellness visit.     Health Risk Assessment:   Patient rates overall health as fair. Patient feels that their physical health rating is slightly better. Patient is satisfied with their life. Eyesight was rated as same. Hearing was rated as slightly worse. Patient feels that their emotional and mental health rating is same. Patients states they are never, rarely angry. Patient states they are often unusually tired/fatigued. Pain experienced in the last 7 days has been some. Patient's pain rating has been 6/10. Patient states that she has experienced no weight loss or gain in last 6 months.     Depression Screening:   PHQ-9 Score: 5      Fall Risk Screening:   In the past year, patient has experienced: history of falling in past year    Number of falls: 2 or more  Injured during fall?: No    Feels unsteady when standing or walking?: Yes    Worried about falling?: Yes      Urinary Incontinence Screening:   Patient has leaked urine accidently in the last six months.     Home Safety:  Patient has trouble with stairs inside or outside of their home. Patient has working smoke alarms and has no working carbon monoxide detector. Home safety hazards include: none.     Nutrition:   Current diet is Regular and No Added Salt.     Medications:   Patient is currently taking over-the-counter supplements. OTC medications include: see medication list. Patient is able to manage medications. B complex, c , iron, cranberry    Activities of Daily Living (ADLs)/Instrumental Activities of Daily Living (IADLs):   Walk and transfer into and out of bed and chair?: Yes  Dress and groom yourself?: Yes    Bathe or shower yourself?: Yes    Feed yourself? Yes  Do your laundry/housekeeping?: Yes  Manage your money, pay your bills and  track your expenses?: Yes  Make your own meals?: Yes    Do your own shopping?: Yes    Previous Hospitalizations:   Any hospitalizations or ED visits within the last 12 months?: Yes    How many hospitalizations have you had in the last year?: 3-4    Advance Care Planning:   Living will: No      PREVENTIVE SCREENINGS      Cardiovascular Screening:    General: Screening Not Indicated and History Lipid Disorder      Diabetes Screening:     General: Screening Current      Colorectal Cancer Screening:     General: Screening Not Indicated      Breast Cancer Screening:     General: Screening Not Indicated      Cervical Cancer Screening:    General: Screening Not Indicated      Osteoporosis Screening:    General: Screening Not Indicated      Abdominal Aortic Aneurysm (AAA) Screening:        General: Screening Not Indicated      Lung Cancer Screening:     General: Screening Not Indicated      Hepatitis C Screening:    General: Screening Not Indicated    Screening, Brief Intervention, and Referral to Treatment (SBIRT)    Screening    Typical number of drinks in a week: 0    Single Item Drug Screening:  How often have you used an illegal drug (including marijuana) or a prescription medication for non-medical reasons in the past year? never    Single Item Drug Screen Score: 0  Interpretation: Negative screen for possible drug use disorder    Social Determinants of Health     Financial Resource Strain: Low Risk  (10/24/2023)    Overall Financial Resource Strain (CARDIA)    • Difficulty of Paying Living Expenses: Not hard at all   Food Insecurity: No Food Insecurity (10/24/2024)    Hunger Vital Sign    • Worried About Running Out of Food in the Last Year: Never true    • Ran Out of Food in the Last Year: Never true   Transportation Needs: No Transportation Needs (10/24/2024)    PRAPARE - Transportation    • Lack of Transportation (Medical): No    • Lack of Transportation (Non-Medical): No   Housing Stability: Low Risk   "(10/24/2024)    Housing Stability Vital Sign    • Unable to Pay for Housing in the Last Year: No    • Number of Times Moved in the Last Year: 0    • Homeless in the Last Year: No   Utilities: Not At Risk (10/24/2024)    [facility] Utilities    • Threatened with loss of utilities: No     No results found.    Objective     /80 (BP Location: Left arm, Patient Position: Sitting, Cuff Size: Standard)   Pulse 60   Ht 4' 6\" (1.372 m)   Wt 79.8 kg (176 lb)   SpO2 90%   BMI 42.44 kg/m²     Physical Exam  Vitals and nursing note reviewed.   Constitutional:       General: She is not in acute distress.     Appearance: She is well-developed. She is obese.   HENT:      Head: Normocephalic and atraumatic.      Right Ear: Tympanic membrane normal.      Left Ear: Tympanic membrane normal.      Nose: No congestion.      Mouth/Throat:      Pharynx: No oropharyngeal exudate.   Eyes:      Conjunctiva/sclera: Conjunctivae normal.   Cardiovascular:      Rate and Rhythm: Normal rate and regular rhythm.      Heart sounds: No murmur heard.  Pulmonary:      Effort: Pulmonary effort is normal. No respiratory distress.      Breath sounds: Normal breath sounds.   Abdominal:      Palpations: Abdomen is soft.      Tenderness: There is no abdominal tenderness.   Musculoskeletal:         General: No swelling.      Cervical back: Neck supple.   Skin:     General: Skin is warm and dry.      Capillary Refill: Capillary refill takes less than 2 seconds.   Neurological:      Mental Status: She is alert and oriented to person, place, and time.   Psychiatric:         Mood and Affect: Mood normal.     Administrative Statements   I have spent a total time of 30 minutes in caring for this patient on the day of the visit/encounter including Counseling / Coordination of care, Documenting in the medical record, and Reviewing / ordering tests, medicine, procedures  .  "

## 2024-10-25 ENCOUNTER — TELEPHONE (OUTPATIENT)
Age: 84
End: 2024-10-25

## 2024-10-25 NOTE — TELEPHONE ENCOUNTER
[facility] stated want to update provider pt was seen yesterday and prescribe 3 medication when nurse put in her tablet there is medication interaction with warfarin pt stated this was discuss with provider nurse also want to update pcp home care is requesting office notes for visit and list of medication prescribe for pt on her visit.can office fax to [phone]

## 2024-10-29 ENCOUNTER — TELEPHONE (OUTPATIENT)
Age: 84
End: 2024-10-29

## 2024-10-29 DIAGNOSIS — R73.03 PREDIABETES: ICD-10-CM

## 2024-10-29 NOTE — TELEPHONE ENCOUNTER
Patient would like all future medications to go to [facility] in [place].  She will no longer be using [facility].  Thank you.

## 2024-10-31 DIAGNOSIS — K21.00 GASTROESOPHAGEAL REFLUX DISEASE WITH ESOPHAGITIS: ICD-10-CM

## 2024-10-31 DIAGNOSIS — E78.5 HYPERLIPIDEMIA, UNSPECIFIED HYPERLIPIDEMIA TYPE: ICD-10-CM

## 2024-10-31 RX ORDER — BLOOD SUGAR DIAGNOSTIC
STRIP MISCELLANEOUS
Qty: 100 STRIP | Refills: 4 | Status: SHIPPED | OUTPATIENT
Start: 2024-10-31

## 2024-11-01 RX ORDER — OMEPRAZOLE 40 MG/1
CAPSULE, DELAYED RELEASE ORAL
Qty: 180 CAPSULE | Refills: 1 | Status: SHIPPED | OUTPATIENT
Start: 2024-11-01

## 2024-11-01 RX ORDER — SIMVASTATIN 40 MG
TABLET ORAL
Qty: 90 TABLET | Refills: 1 | Status: SHIPPED | OUTPATIENT
Start: 2024-11-01

## 2024-11-19 ENCOUNTER — NURSE TRIAGE (OUTPATIENT)
Age: 84
End: 2024-11-19

## 2024-11-19 NOTE — TELEPHONE ENCOUNTER
"Reason for Conversation: Received call from pt stating starting yesterday, she noticed her R leg (knee down to foot) is swollen. She states she has had this in the past and was told to elevate her leg which she has not been. She stated however her weight on Sun was 174lbs, Mon was 177lbs, and today is 175lbs. Denies chest pain nor sob. She is currently taking lasix 20mg daily.      Advised will send a message to the provider to review and advise.     Clinical: Depending on the response, pt will need a refill of lasix. Thanks!     VS/Weight: (Note: Please include date/time vitals/weight were measured)  ruvalcaba snot take BP at home but weight has been as follows:    Sun: 174lbs  Mon: 177lbs  Today: 175lbs    Pain: No    Risk Factors: Heart Failure    Recent relevant testing and date of testing: cardiac event recorder 10/11, last CMP 10/17    Medication: Lasix 20mg daily    Upcoming Office Visit: 1/9/2025    Last Office Visit: 9/9/2024       Answer Assessment - Initial Assessment Questions  1. ONSET: \"When did the swelling start?\" (e.g., minutes, hours, days)      Yesterday   2. LOCATION: \"What part of the leg is swollen?\"  \"Are both legs swollen or just one leg?\"      R leg below knee into the foot   3. SEVERITY: \"How bad is the swelling?\" (e.g., localized; mild, moderate, severe)      States her leg is bigger than her lef  4. REDNESS: \"Does the swelling look red or infected?\"      denies  5. PAIN: \"Is the swelling painful to touch?\" If Yes, ask: \"How painful is it?\"   (Scale 1-10; mild, moderate or severe)      Pain in the ankle from OA  6. FEVER: \"Do you have a fever?\" If Yes, ask: \"What is it, how was it measured, and when did it start?\"       denies  7. CAUSE: \"What do you think is causing the leg swelling?\"      unsure  8. MEDICAL HISTORY: \"Do you have a history of blood clots (e.g., DVT), cancer, heart failure, kidney disease, or liver failure?\"      HF  9. RECURRENT SYMPTOM: \"Have you had leg swelling before?\" If " "Yes, ask: \"When was the last time?\" \"What happened that time?\"      Yes - was told to elevate her leg but has not been   10. OTHER SYMPTOMS: \"Do you have any other symptoms?\" (e.g., chest pain, difficulty breathing)        denies    Protocols used: Leg Swelling and Edema-Adult-[place]    "

## 2024-11-20 ENCOUNTER — TELEPHONE (OUTPATIENT)
Dept: CARDIOLOGY CLINIC | Facility: CLINIC | Age: 84
End: 2024-11-20

## 2024-11-20 ENCOUNTER — TELEPHONE (OUTPATIENT)
Age: 84
End: 2024-11-20

## 2024-11-20 DIAGNOSIS — I50.31 ACUTE DIASTOLIC CONGESTIVE HEART FAILURE (HCC): ICD-10-CM

## 2024-11-20 RX ORDER — FUROSEMIDE 40 MG/1
40 TABLET ORAL DAILY
Qty: 30 TABLET | Refills: 2 | Status: SHIPPED | OUTPATIENT
Start: 2024-11-20

## 2024-11-20 NOTE — TELEPHONE ENCOUNTER
Caller: Marisol    Doctor: Dr. Esteves     Reason for call: Patient is requesting a refil on her furosemide 20mg but is showing as  in sytem. She advised that provider is having her take 40mg of furosemide right now. If so, can a new script be written for patient and sent to Pharmacy listed on chart? [id number]- [place] [address]    Call back#: [phone]

## 2024-11-20 NOTE — TELEPHONE ENCOUNTER
11/20 - scheduled pt for bloodwork on 11/27
Pt called the office, pt needs her INR to be drawn    Please reach out to pt for a setup date   
Pt is aware   
Thank you   
No

## 2024-11-27 ENCOUNTER — RESULTS FOLLOW-UP (OUTPATIENT)
Dept: CARDIOLOGY CLINIC | Facility: CLINIC | Age: 84
End: 2024-11-27

## 2024-11-27 ENCOUNTER — TELEPHONE (OUTPATIENT)
Age: 84
End: 2024-11-27

## 2024-11-27 ENCOUNTER — APPOINTMENT (OUTPATIENT)
Dept: LAB | Facility: HOSPITAL | Age: 84
End: 2024-11-27
Attending: INTERNAL MEDICINE
Payer: MEDICARE

## 2024-11-27 ENCOUNTER — TELEPHONE (OUTPATIENT)
Dept: CARDIOLOGY CLINIC | Facility: CLINIC | Age: 84
End: 2024-11-27

## 2024-11-27 ENCOUNTER — ANTICOAG VISIT (OUTPATIENT)
Dept: CARDIOLOGY CLINIC | Facility: CLINIC | Age: 84
End: 2024-11-27

## 2024-11-27 DIAGNOSIS — Z79.01 LONG TERM (CURRENT) USE OF ANTICOAGULANTS: ICD-10-CM

## 2024-11-27 LAB
INR PPP: 2.3 (ref 0.85–1.19)
PROTHROMBIN TIME: 26 SECONDS (ref 12.3–15)

## 2024-11-27 PROCEDURE — 36415 COLL VENOUS BLD VENIPUNCTURE: CPT

## 2024-11-27 PROCEDURE — 85610 PROTHROMBIN TIME: CPT

## 2024-11-27 NOTE — TELEPHONE ENCOUNTER
Hi,  Requesting a pt/inr home draw to be done around 12/23. Please call the pt to set up. Script is in the chart.     Thank you.

## 2024-11-27 NOTE — TELEPHONE ENCOUNTER
Adrienne-from [facility] home health visited pt for Physical Therapy and wants to know if PCP will sign recertification orders. Please call back @[phone] and or fax this response to [phone].

## 2024-11-27 NOTE — TELEPHONE ENCOUNTER
Adrienne has been notified that recert orders will be signed. She will be faxing them over to us.

## 2024-12-09 DIAGNOSIS — M51.369 DDD (DEGENERATIVE DISC DISEASE), LUMBAR: ICD-10-CM

## 2024-12-09 DIAGNOSIS — G62.9 NEUROPATHY: ICD-10-CM

## 2024-12-09 RX ORDER — PREGABALIN 25 MG/1
25 CAPSULE ORAL 2 TIMES DAILY
Qty: 60 CAPSULE | Refills: 1 | Status: SHIPPED | OUTPATIENT
Start: 2024-12-09

## 2024-12-16 ENCOUNTER — OFFICE VISIT (OUTPATIENT)
Age: 84
End: 2024-12-16
Payer: MEDICARE

## 2024-12-16 VITALS
SYSTOLIC BLOOD PRESSURE: 110 MMHG | HEART RATE: 61 BPM | WEIGHT: 179 LBS | HEIGHT: 55 IN | OXYGEN SATURATION: 91 % | DIASTOLIC BLOOD PRESSURE: 60 MMHG | TEMPERATURE: 97.7 F | BODY MASS INDEX: 41.42 KG/M2

## 2024-12-16 DIAGNOSIS — I27.20 PULMONARY HYPERTENSION (HCC): Chronic | ICD-10-CM

## 2024-12-16 DIAGNOSIS — E66.01 MORBID OBESITY DUE TO EXCESS CALORIES (HCC): ICD-10-CM

## 2024-12-16 DIAGNOSIS — J96.11 CHRONIC HYPOXEMIC RESPIRATORY FAILURE (HCC): ICD-10-CM

## 2024-12-16 DIAGNOSIS — G47.33 OSA (OBSTRUCTIVE SLEEP APNEA): Primary | ICD-10-CM

## 2024-12-16 PROCEDURE — 99213 OFFICE O/P EST LOW 20 MIN: CPT

## 2024-12-16 RX ORDER — ALBUTEROL SULFATE 90 UG/1
2 INHALANT RESPIRATORY (INHALATION) EVERY 6 HOURS PRN
COMMUNITY

## 2024-12-16 NOTE — ASSESSMENT & PLAN NOTE
-RVSP 54 mmHg on TTE earlier this year  -Continue wearing CPAP nightly  -Continue f/u with Cardiology

## 2024-12-16 NOTE — ASSESSMENT & PLAN NOTE
-Patient reports wearing nightly with no issues or concerns with her machine or mask.  Sleeping well and benefiting from its use.  Unfortunately, no compliance report to review during our office visit today.  Will review once DME sends over.    -She will continue wearing CPAP nightly with 2 L oxygen bled in  -CPAP supplies orders placed as requested    **Addendum  -Received compliance report from DME the next day and reviewed:  Compliance report from 9/17/2024 - 12/15/2024 shows use of CPAP greater than 4 hours 82% of the time.  Average nightly use 5 hours and 11 minutes.  Residual AHI elevated at 15.8.  Average unintended leak 55.7L/min    I called the patient to discuss with her that she has an elevated residual AHI and high mask leakage. I recommend a mask fitting appointment and return for follow-up in 3 months to review compliance data again. If not improved, recommend in-lab titration study, however patient stated she would not want to do another sleep study.

## 2024-12-16 NOTE — ASSESSMENT & PLAN NOTE
-Patient declined updated oxygen titration walk test today  -She will continue 2 L supplemental oxygen as needed to maintain SpO2 above 88%

## 2024-12-16 NOTE — PROGRESS NOTES
Pulmonary Follow Up Note  Marisol Otoole 84 y.o. female MRN: [id number]  12/17/2024    Assessment/Plan:    Pulmonary hypertension (HCC)  -RVSP 54 mmHg on TTE earlier this year  -Continue wearing CPAP nightly  -Continue f/u with Cardiology    JANICE (obstructive sleep apnea)  -Patient reports wearing nightly with no issues or concerns with her machine or mask.  Sleeping well and benefiting from its use.  Unfortunately, no compliance report to review during our office visit today.  Will review once DME sends over.    -She will continue wearing CPAP nightly with 2 L oxygen bled in  -CPAP supplies orders placed as requested    **Addendum  -Received compliance report from DME the next day and reviewed:  Compliance report from 9/17/2024 - 12/15/2024 shows use of CPAP greater than 4 hours 82% of the time.  Average nightly use 5 hours and 11 minutes.  Residual AHI elevated at 15.8.  Average unintended leak 55.7L/min    I called the patient to discuss with her that she has an elevated residual AHI and high mask leakage. I recommend a mask fitting appointment and return for follow-up in 3 months to review compliance data again. If not improved, recommend in-lab titration study, however patient stated she would not want to do another sleep study.     Morbid obesity due to excess calories (HCC)  -Recommend weight loss    Chronic hypoxemic respiratory failure (HCC)  -Patient declined updated oxygen titration walk test today  -She will continue 2 L supplemental oxygen as needed to maintain SpO2 above 88%        Diagnoses and all orders for this visit:    JANICE (obstructive sleep apnea)  -     PAP DME Resupply/Reorder  -     Mask fitting only; Future    Pulmonary hypertension (HCC)  -     Ambulatory Referral to Pulmonology    Chronic hypoxemic respiratory failure (HCC)    Morbid obesity due to excess calories (HCC)    Other orders  -     albuterol (PROVENTIL HFA,VENTOLIN HFA) 90 mcg/act inhaler; Inhale 2 puffs every 6 (six) hours as  needed for wheezing        Return in about 6 months (around 6/16/2025).      History of Present Illness     Chief Complaint:   Chief Complaint   Patient presents with    Follow-up       Patient ID: Marisol is a 84 y.o. y.o. female has a past medical history of Anemia (08/22/2018), Anxiety, Arthritis, AVB (atrioventricular block), Cataract, CHF (congestive heart failure) (HCC), COPD, mild (HCC), Coronary artery disease, Dislocation of right shoulder joint, Frequent UTI, GERD (gastroesophageal reflux disease), H/O: pneumonia, Heme positive stool, Hyperlipidemia, Hypertension, Hypothyroidism, Morbid obesity with BMI of 50.0-59.9, adult (HCC), Obesity, morbid (HCC) (08/22/2018), JANICE on CPAP, Pulmonary hypertension (HCC) (08/22/2018), Severe aortic stenosis, Simple goiter, Skin cyst, and Wears glasses.      12/16/2024  HPI: Marisol Otoole is a 84 y.o. female with a PMH of aortic stenosis s/p TAVR, HTN, JANICE on CPAP, hyperlipidemia, obesity, pulmonary hypertension, chronic hypoxic respiratory failure on 2 L oxygen with exertion and at night who is here today for a follow-up visit.  Last seen in the office in May 2022.  At that time, she was maintained on Advair, albuterol, CPAP, and 2 L supplemental oxygen.    Patient states she stopped taking Advair inhaler a while ago.  Did not notice any difference after taking.  Rarely using albuterol inhaler.  She is compliant with her CPAP nightly, denies any issues with her mask or machine.  She is here today at the advisement of her DME for new supply orders.  She states she does not experience shortness of breath that often.  No cough, wheezing, chest pain, lower extremity swelling.  She does not regularly wear her supplemental oxygen, only if she feels she needs to.      Review of Systems   Constitutional:  Negative for activity change, chills, diaphoresis, fever and unexpected weight change.   HENT:  Negative for congestion, postnasal drip, rhinorrhea, sore throat, trouble  swallowing and voice change.    Respiratory:  Negative for cough, chest tightness, shortness of breath and wheezing.    Cardiovascular:  Negative for chest pain, palpitations and leg swelling.   Allergic/Immunologic: Negative.        Historical Information   Past Medical History:   Diagnosis Date    Anemia 08/22/2018    Anxiety     Arthritis     AVB (atrioventricular block)     first degree    Cataract     CHF (congestive heart failure) ([facility])     COPD, mild ([facility])     Coronary artery disease     Dislocation of right shoulder joint     Frequent UTI     GERD (gastroesophageal reflux disease)     H/O: pneumonia     Heme positive stool     Hyperlipidemia     Hypertension     Hypothyroidism     Morbid obesity with BMI of 50.0-59.9, adult ([facility])     Obesity, morbid (HCC) 08/22/2018    JANICE on CPAP     Pulmonary hypertension ([facility]) 08/22/2018    Severe aortic stenosis     Simple goiter     Skin cyst     within the armpits, right    Wears glasses      Past Surgical History:   Procedure Laterality Date    BREAST BIOPSY      CARDIAC CATHETERIZATION      CARDIAC ELECTROPHYSIOLOGY PROCEDURE N/A 8/12/2024    Procedure: Cardiac pacer implant;  Surgeon: Clarke Zuluaga MD;  Location:  CARDIAC CATH LAB;  Service: Cardiology    CARPAL TUNNEL RELEASE Bilateral     CHOLECYSTECTOMY      DILATION AND CURETTAGE OF UTERUS      HYSTEROSCOPY      MASTOID SURGERY      FL COLONOSCOPY FLX DX W/COLLJ SPEC WHEN PFRMD N/A 9/6/2018    Procedure: COLONOSCOPY;  Surgeon: Shree Sosa III, MD;  Location: [facility];  Service: Gastroenterology    FL ECHO TRANSESOPHAG R-T 2D W/PRB IMG ACQUISJ I&R N/A 10/9/2018    Procedure: INTRA-OP TRANSESOPHAGEAL ECHOCARDIOGRAM (GARRISON);  Surgeon: Kushal Camarena DO;  Location:  MAIN OR;  Service: Cardiac Surgery    FL ESOPHAGOGASTRODUODENOSCOPY TRANSORAL DIAGNOSTIC N/A 8/31/2018    Procedure: ESOPHAGOGASTRODUODENOSCOPY (EGD);  Surgeon: Shree Sosa III, MD;  Location: [facility];  Service: Gastroenterology    FL  REPLACE AORTIC VALVE OPENFEMORAL ARTERY APPROACH N/A 10/9/2018    Procedure: REPLACEMENT AORTIC VALVE TRANSCATHETER (TAVR) TRANSFEMORAL W/ 23 MM MENDOZA ONE S3 VALVE (ACCESS OF LEFT);  Surgeon: Kushal Camarena DO;  Location: [facility];  Service: Cardiac Surgery    TOTAL HIP ARTHROPLASTY Left 2007    TOTAL KNEE ARTHROPLASTY Bilateral      Family History   Problem Relation Age of Onset    Diabetes Mother     Stroke Mother     Cancer Father     Lung cancer Father     Diabetes Sister     Heart disease Sister     Hypertension Sister     Coronary artery disease Family     Diabetes Family     Hypertension Family     Cancer Family     Stroke Family     Thyroid disease Neg Hx        Smoking history: She reports that she has never smoked. She has never been exposed to tobacco smoke. She has never used smokeless tobacco.    Occupational History:     Immunization History   Administered Date(s) Administered    COVID-19 PFIZER VACCINE 0.3 ML IM 03/16/2021, 04/07/2021    Pneumococcal Conjugate 13-Valent 06/19/2018    Pneumococcal Polysaccharide PPV23 07/01/2019       Meds/Allergies     Current Outpatient Medications:     acetaminophen (TYLENOL) 500 mg tablet, Take 500 mg by mouth every 6 (six) hours as needed, Disp: , Rfl:     albuterol (PROVENTIL HFA,VENTOLIN HFA) 90 mcg/act inhaler, Inhale 2 puffs every 6 (six) hours as needed for wheezing, Disp: , Rfl:     aspirin (ECOTRIN LOW STRENGTH) 81 mg EC tablet, Take 1 tablet (81 mg total) by mouth daily, Disp: 100 tablet, Rfl: 0    b complex vitamins capsule, Take 1 capsule by mouth 2 (two) times a day  , Disp: , Rfl:     Blood Glucose Monitoring Suppl (OneTouch Verio Reflect) w/Device KIT, Check blood sugars once daily. Please substitute with appropriate alternative as covered by patient's insurance. Dx: E11.65, Disp: 1 kit, Rfl: 0    Calcium Carb-Cholecalciferol (CALCIUM 600 + D PO), Take 1 tablet by mouth 2 (two) times a day, Disp: , Rfl:     Cranberry 250 MG TABS, Take by  mouth, Disp: , Rfl:     ferrous sulfate 324 (65 Fe) mg, Take 1 tablet (324 mg total) by mouth daily before breakfast Take 1 tablet every other day., Disp: , Rfl:     fluticasone (FLONASE) 50 mcg/act nasal spray, 1 spray into each nostril daily, Disp: 16 g, Rfl: 1    furosemide (LASIX) 40 mg tablet, Take 1 tablet (40 mg total) by mouth daily, Disp: 30 tablet, Rfl: 2    glucose blood (OneTouch Verio) test strip, Check blood sugars once daily. Please substitute with appropriate alternative as covered by patient's insurance. Dx: E11.65, Disp: 100 each, Rfl: 3    hydrocortisone 2.5 % cream, Apply topically 2 (two) times a day, Disp: 28 g, Rfl: 1    Lancets (onetouch ultrasoft) lancets, Use in the morning Use as instructed, Disp: 100 each, Rfl: 1    levothyroxine 50 mcg tablet, TAKE 1 TABLET BY MOUTH EVERY DAY, Disp: 100 tablet, Rfl: 1    mupirocin (BACTROBAN) 2 % ointment, Apply topically 2 (two) times a day, Disp: 22 g, Rfl: 0    ofloxacin (OCUFLOX) 0.3 % ophthalmic solution, 5 drops twice daily to left ear x 10 days., Disp: 10 mL, Rfl: 1    omeprazole (PriLOSEC) 40 MG capsule, TAKE 1 CAPSULE BY MOUTH TWICE A DAY, Disp: 180 capsule, Rfl: 1    OneTouch Delica Lancets 33G MISC, Check blood sugars once daily. Please substitute with appropriate alternative as covered by patient's insurance. Dx: E11.65, Disp: 100 each, Rfl: 3    OneTouch Verio test strip, USE 1 EACH IN THE MORNING USE AS INSTRUCTED, Disp: 100 strip, Rfl: 4    oxybutynin (DITROPAN-XL) 5 mg 24 hr tablet, TAKE 1 TABLET (5 MG TOTAL) BY MOUTH DAILY., Disp: 90 tablet, Rfl: 1    oxygen gas, Inhale 2 L/min continuous. Indications: copd, Disp: , Rfl:     pregabalin (LYRICA) 25 mg capsule, Take 1 capsule (25 mg total) by mouth 2 (two) times a day, Disp: 60 capsule, Rfl: 1    sertraline (ZOLOFT) 100 mg tablet, TAKE 1 TABLET BY MOUTH EVERY DAY, Disp: 90 tablet, Rfl: 1    simvastatin (ZOCOR) 40 mg tablet, TAKE 1 TABLET BY MOUTH EVERYDAY AT BEDTIME, Disp: 90 tablet, Rfl:  "1    triamcinolone (KENALOG) 0.1 % cream, Apply topically 2 (two) times a day, Disp: 15 g, Rfl: 1    Turmeric (QC Tumeric Complex) 500 MG CAPS, Take by mouth, Disp: , Rfl:     warfarin (Coumadin) 2.5 mg tablet, Take 1 tablet (2.5mg) Mon-Sat. Take 2 tablets (5mg) on Sun or as directed Do not start before August 9, 2024. (Patient taking differently: Take 2.5 mg by mouth daily), Disp: 102 tablet, Rfl: 0    promethazine-dextromethorphan (PHENERGAN-DM) 6.25-15 mg/5 mL oral syrup, Take 2.5 mL by mouth 4 (four) times a day as needed for cough (Patient not taking: Reported on 12/16/2024), Disp: 180 mL, Rfl: 0    Allergies:   Allergies   Allergen Reactions    Latex Rash    Neosporin [Neomycin-Bacitracin Zn-Polymyx] Rash and Other (See Comments)     hives per [facility] order         Vitals:  Vitals:    12/16/24 1419   BP: 110/60   Pulse: 61   Temp: 97.7 °F (36.5 °C)   SpO2: 91%   Weight: 81.2 kg (179 lb)   Height: 4' 6.02\" (1.372 m)   Oxygen Therapy  SpO2: 91 %  .  Wt Readings from Last 3 Encounters:   12/16/24 81.2 kg (179 lb)   10/24/24 79.8 kg (176 lb)   09/19/24 80.6 kg (177 lb 9.6 oz)     Body mass index is 43.13 kg/m².    Physical Exam  Vitals and nursing note reviewed.   Constitutional:       General: She is not in acute distress.     Appearance: Normal appearance. She is well-developed. She is morbidly obese.   Cardiovascular:      Rate and Rhythm: Normal rate and regular rhythm.      Heart sounds: Normal heart sounds, S1 normal and S2 normal. No murmur heard.  Pulmonary:      Effort: Pulmonary effort is normal.      Breath sounds: Normal breath sounds. No decreased breath sounds, wheezing, rhonchi or rales.   Musculoskeletal:         General: No swelling.      Right lower leg: No edema.      Left lower leg: No edema.   Neurological:      Mental Status: She is alert.   Psychiatric:         Mood and Affect: Mood and affect normal.         Behavior: Behavior normal. Behavior is cooperative.           Labs: I have personally " reviewed pertinent lab results.  Lab Results   Component Value Date    WBC 8.99 10/17/2024    HGB 10.6 (L) 10/17/2024    HCT 36.1 10/17/2024    MCV 89 10/17/2024     (H) 10/17/2024     Lab Results   Component Value Date    GLUCOSE 101 10/09/2018    CALCIUM 9.7 10/17/2024    K 3.9 10/17/2024    CO2 28 10/17/2024     10/17/2024    BUN 24 10/17/2024    CREATININE 0.83 10/17/2024     Lab Results   Component Value Date    IGE 5.47 01/31/2018     Lab Results   Component Value Date    ALT 15 10/17/2024    AST 21 10/17/2024    ALKPHOS 75 10/17/2024       Studies:    Imaging and other studies: I have personally reviewed pertinent reports and I have personally reviewed pertinent films in PACS     Chest x-ray 8/13/2024  No acute cardiopulmonary disease.    EKG, Pathology, and Other Studies: I have personally reviewed pertinent reports.        Pulmonary function testing:     Pulmonary Functions Testing Results: 04/29/2022    FEV1/FVC ratio 90%    FEV1 74% predicted  FVC 64% predicted  (-) response to bronchodilators  TLC 73 % predicted  RV 66 % predicted  DLCO corrected for hemoglobin 97 % predicted    Impression: No large airway obstruction. While both FEV1 and FVC increased by 16 percent after albuterol, neither met ats standard for bronchodilator responsiveness as they did not improve by 200 milliliters. Decreased forced vital capacity and total lung capacity consistent with restrictive lung disease. Normal diffusion capacity. 6 minute walk as per above with exertional hypoxemia and dyspnea requiring 3 liters supplemental oxygen      Office Spirometry Results:     ESS:

## 2024-12-17 ENCOUNTER — TELEPHONE (OUTPATIENT)
Age: 84
End: 2024-12-17

## 2024-12-17 LAB
DME PARACHUTE DELIVERY DATE REQUESTED: NORMAL
DME PARACHUTE ORDER STATUS: NORMAL
DME PARACHUTE SUPPLIER NAME: NORMAL
DME PARACHUTE SUPPLIER PHONE: NORMAL

## 2024-12-18 ENCOUNTER — TELEPHONE (OUTPATIENT)
Age: 84
End: 2024-12-18

## 2024-12-18 LAB
DME PARACHUTE DELIVERY DATE REQUESTED: NORMAL
DME PARACHUTE ITEM DESCRIPTION: NORMAL
DME PARACHUTE ORDER STATUS: NORMAL
DME PARACHUTE SUPPLIER NAME: NORMAL
DME PARACHUTE SUPPLIER PHONE: NORMAL

## 2024-12-18 NOTE — TELEPHONE ENCOUNTER
PT called in to let Ann Grubbs know she was able to tighten up her mask last night and she used the machine it worked perfectly fine after she tighten it up . Patient will like to cancel the mask fitting . Please advise

## 2024-12-18 NOTE — TELEPHONE ENCOUNTER
Patient is calling because their mask is not leaking anymore now that they tightened their mask patient would like to know if they still need to go and have a mask fitting and would like zeenat to the call the patient thank you

## 2024-12-26 ENCOUNTER — APPOINTMENT (OUTPATIENT)
Dept: LAB | Facility: HOSPITAL | Age: 84
End: 2024-12-26
Attending: INTERNAL MEDICINE
Payer: MEDICARE

## 2024-12-26 DIAGNOSIS — Z79.01 LONG TERM (CURRENT) USE OF ANTICOAGULANTS: ICD-10-CM

## 2024-12-26 DIAGNOSIS — Z00.00 HEALTHCARE MAINTENANCE: ICD-10-CM

## 2024-12-26 DIAGNOSIS — D50.8 IRON DEFICIENCY ANEMIA SECONDARY TO INADEQUATE DIETARY IRON INTAKE: ICD-10-CM

## 2024-12-26 LAB
ALBUMIN SERPL BCG-MCNC: 3.7 G/DL (ref 3.5–5)
ALP SERPL-CCNC: 83 U/L (ref 34–104)
ALT SERPL W P-5'-P-CCNC: 13 U/L (ref 7–52)
ANION GAP SERPL CALCULATED.3IONS-SCNC: 7 MMOL/L (ref 4–13)
AST SERPL W P-5'-P-CCNC: 17 U/L (ref 13–39)
BASOPHILS # BLD AUTO: 0.07 THOUSANDS/ÂΜL (ref 0–0.1)
BASOPHILS NFR BLD AUTO: 1 % (ref 0–1)
BILIRUB SERPL-MCNC: 0.34 MG/DL (ref 0.2–1)
BUN SERPL-MCNC: 26 MG/DL (ref 5–25)
CALCIUM SERPL-MCNC: 10 MG/DL (ref 8.4–10.2)
CHLORIDE SERPL-SCNC: 101 MMOL/L (ref 96–108)
CHOLEST SERPL-MCNC: 123 MG/DL (ref ?–200)
CO2 SERPL-SCNC: 31 MMOL/L (ref 21–32)
CREAT SERPL-MCNC: 0.74 MG/DL (ref 0.6–1.3)
DME PARACHUTE DELIVERY DATE REQUESTED: NORMAL
DME PARACHUTE ITEM DESCRIPTION: NORMAL
DME PARACHUTE ORDER STATUS: NORMAL
DME PARACHUTE SUPPLIER NAME: NORMAL
DME PARACHUTE SUPPLIER PHONE: NORMAL
EOSINOPHIL # BLD AUTO: 0.67 THOUSAND/ÂΜL (ref 0–0.61)
EOSINOPHIL NFR BLD AUTO: 7 % (ref 0–6)
ERYTHROCYTE [DISTWIDTH] IN BLOOD BY AUTOMATED COUNT: 17.4 % (ref 11.6–15.1)
FERRITIN SERPL-MCNC: 30 NG/ML (ref 11–307)
GFR SERPL CREATININE-BSD FRML MDRD: 74 ML/MIN/1.73SQ M
GLUCOSE P FAST SERPL-MCNC: 87 MG/DL (ref 65–99)
HCT VFR BLD AUTO: 37.5 % (ref 34.8–46.1)
HDLC SERPL-MCNC: 65 MG/DL
HGB BLD-MCNC: 11 G/DL (ref 11.5–15.4)
IMM GRANULOCYTES # BLD AUTO: 0.04 THOUSAND/UL (ref 0–0.2)
IMM GRANULOCYTES NFR BLD AUTO: 0 % (ref 0–2)
INR PPP: 2.27 (ref 0.85–1.19)
IRON SATN MFR SERPL: 7 % (ref 15–50)
IRON SERPL-MCNC: 24 UG/DL (ref 50–212)
LDLC SERPL CALC-MCNC: 47 MG/DL (ref 0–100)
LYMPHOCYTES # BLD AUTO: 1.74 THOUSANDS/ÂΜL (ref 0.6–4.47)
LYMPHOCYTES NFR BLD AUTO: 17 % (ref 14–44)
MCH RBC QN AUTO: 24.1 PG (ref 26.8–34.3)
MCHC RBC AUTO-ENTMCNC: 29.3 G/DL (ref 31.4–37.4)
MCV RBC AUTO: 82 FL (ref 82–98)
MONOCYTES # BLD AUTO: 0.92 THOUSAND/ÂΜL (ref 0.17–1.22)
MONOCYTES NFR BLD AUTO: 9 % (ref 4–12)
NEUTROPHILS # BLD AUTO: 6.73 THOUSANDS/ÂΜL (ref 1.85–7.62)
NEUTS SEG NFR BLD AUTO: 66 % (ref 43–75)
NONHDLC SERPL-MCNC: 58 MG/DL
NRBC BLD AUTO-RTO: 0 /100 WBCS
PLATELET # BLD AUTO: 418 THOUSANDS/UL (ref 149–390)
PMV BLD AUTO: 9.2 FL (ref 8.9–12.7)
POTASSIUM SERPL-SCNC: 4.9 MMOL/L (ref 3.5–5.3)
PROT SERPL-MCNC: 7 G/DL (ref 6.4–8.4)
PROTHROMBIN TIME: 25.7 SECONDS (ref 12.3–15)
RBC # BLD AUTO: 4.56 MILLION/UL (ref 3.81–5.12)
SODIUM SERPL-SCNC: 139 MMOL/L (ref 135–147)
TIBC SERPL-MCNC: 359.8 UG/DL (ref 250–450)
TRANSFERRIN SERPL-MCNC: 257 MG/DL (ref 203–362)
TRIGL SERPL-MCNC: 57 MG/DL (ref ?–150)
TSH SERPL DL<=0.05 MIU/L-ACNC: 1.33 UIU/ML (ref 0.45–4.5)
UIBC SERPL-MCNC: 336 UG/DL (ref 155–355)
WBC # BLD AUTO: 10.17 THOUSAND/UL (ref 4.31–10.16)

## 2024-12-26 PROCEDURE — 83550 IRON BINDING TEST: CPT

## 2024-12-26 PROCEDURE — 85610 PROTHROMBIN TIME: CPT

## 2024-12-26 PROCEDURE — 80061 LIPID PANEL: CPT

## 2024-12-26 PROCEDURE — 84443 ASSAY THYROID STIM HORMONE: CPT

## 2024-12-26 PROCEDURE — 36415 COLL VENOUS BLD VENIPUNCTURE: CPT

## 2024-12-26 PROCEDURE — 83540 ASSAY OF IRON: CPT

## 2024-12-26 PROCEDURE — 80053 COMPREHEN METABOLIC PANEL: CPT

## 2024-12-26 PROCEDURE — 85025 COMPLETE CBC W/AUTO DIFF WBC: CPT

## 2024-12-26 PROCEDURE — 82728 ASSAY OF FERRITIN: CPT

## 2024-12-27 ENCOUNTER — RESULTS FOLLOW-UP (OUTPATIENT)
Dept: FAMILY MEDICINE CLINIC | Facility: CLINIC | Age: 84
End: 2024-12-27

## 2024-12-27 ENCOUNTER — ANTICOAG VISIT (OUTPATIENT)
Dept: CARDIOLOGY CLINIC | Facility: CLINIC | Age: 84
End: 2024-12-27

## 2024-12-27 DIAGNOSIS — D50.8 IRON DEFICIENCY ANEMIA SECONDARY TO INADEQUATE DIETARY IRON INTAKE: Primary | ICD-10-CM

## 2025-01-05 DIAGNOSIS — E03.9 ACQUIRED HYPOTHYROIDISM: ICD-10-CM

## 2025-01-07 RX ORDER — LEVOTHYROXINE SODIUM 50 UG/1
50 TABLET ORAL DAILY
Qty: 90 TABLET | Refills: 2 | Status: SHIPPED | OUTPATIENT
Start: 2025-01-07

## 2025-01-09 ENCOUNTER — OFFICE VISIT (OUTPATIENT)
Dept: CARDIOLOGY CLINIC | Facility: CLINIC | Age: 85
End: 2025-01-09
Payer: MEDICARE

## 2025-01-09 ENCOUNTER — RESULTS FOLLOW-UP (OUTPATIENT)
Dept: CARDIOLOGY CLINIC | Facility: CLINIC | Age: 85
End: 2025-01-09

## 2025-01-09 ENCOUNTER — IN-CLINIC DEVICE VISIT (OUTPATIENT)
Dept: CARDIOLOGY CLINIC | Facility: CLINIC | Age: 85
End: 2025-01-09
Payer: MEDICARE

## 2025-01-09 VITALS
BODY MASS INDEX: 40.27 KG/M2 | WEIGHT: 174 LBS | HEIGHT: 55 IN | OXYGEN SATURATION: 98 % | RESPIRATION RATE: 18 BRPM | HEART RATE: 59 BPM | DIASTOLIC BLOOD PRESSURE: 60 MMHG | SYSTOLIC BLOOD PRESSURE: 126 MMHG

## 2025-01-09 DIAGNOSIS — I27.20 PULMONARY HYPERTENSION (HCC): Chronic | ICD-10-CM

## 2025-01-09 DIAGNOSIS — I25.10 CORONARY ARTERY DISEASE INVOLVING NATIVE CORONARY ARTERY OF NATIVE HEART WITHOUT ANGINA PECTORIS: ICD-10-CM

## 2025-01-09 DIAGNOSIS — I44.30 AVB (ATRIOVENTRICULAR BLOCK): ICD-10-CM

## 2025-01-09 DIAGNOSIS — E78.2 MIXED HYPERLIPIDEMIA: Chronic | ICD-10-CM

## 2025-01-09 DIAGNOSIS — I35.0 NON-RHEUMATIC AORTIC STENOSIS: ICD-10-CM

## 2025-01-09 DIAGNOSIS — Z95.0 PRESENCE OF PERMANENT CARDIAC PACEMAKER: ICD-10-CM

## 2025-01-09 DIAGNOSIS — Z95.0 PRESENCE OF PERMANENT CARDIAC PACEMAKER: Primary | ICD-10-CM

## 2025-01-09 DIAGNOSIS — I50.32 CHRONIC HEART FAILURE WITH PRESERVED EJECTION FRACTION (HFPEF) (HCC): Primary | ICD-10-CM

## 2025-01-09 DIAGNOSIS — I10 PRIMARY HYPERTENSION: ICD-10-CM

## 2025-01-09 DIAGNOSIS — I48.0 PAROXYSMAL ATRIAL FIBRILLATION (HCC): ICD-10-CM

## 2025-01-09 DIAGNOSIS — I49.5 SSS (SICK SINUS SYNDROME) (HCC): ICD-10-CM

## 2025-01-09 DIAGNOSIS — Z95.2 HISTORY OF TRANSCATHETER AORTIC VALVE REPLACEMENT (TAVR): ICD-10-CM

## 2025-01-09 DIAGNOSIS — I34.2 NONRHEUMATIC MITRAL VALVE STENOSIS: ICD-10-CM

## 2025-01-09 PROCEDURE — 93280 PM DEVICE PROGR EVAL DUAL: CPT | Performed by: INTERNAL MEDICINE

## 2025-01-09 PROCEDURE — 99214 OFFICE O/P EST MOD 30 MIN: CPT

## 2025-01-09 NOTE — PROGRESS NOTES
[facility] [place]  [address]  [place] [address]  Cardiology Office Note    Marisol Otoole 84 y.o. female MRN: [id number]    01/09/25          Assessment/Plan:  Assessment & Plan  Chronic heart failure with preserved ejection fraction (HFpEF) (HCC)  Wt Readings from Last 3 Encounters:   01/09/25 78.9 kg (174 lb)   12/16/24 81.2 kg (179 lb)   10/24/24 79.8 kg (176 lb)     Patient appears euvolemic  Maintain daily weights and low sodium diet.   GDMT: Lasix 40 mg daily  SSS (sick sinus syndrome) ([facility])  S/p PPM   Patient follows with device clinic  Coronary artery disease involving native coronary artery of native heart without angina pectoris  Patient denies chest pain or anginal equivalent  Medications:  Antiplatelet: Aspirin 81 mg  Beta-blocker: None  Statin: Simvastatin 40 mg daily    Paroxysmal atrial fibrillation (HCC)  Patient maintaining sinus rhythm  Not on rate control medication  Continue Coumadin for AC; follows with Coumadin clinic    Nonrheumatic mitral valve stenosis  S/p TAVR in 2018  Mixed hyperlipidemia  Continue simvastatin  AVB (atrioventricular block)  S/p PPM, patient follows with device clinic  Primary hypertension    History of transcatheter aortic valve replacement (TAVR)    Presence of permanent cardiac pacemaker    Pulmonary hypertension (HCC)           Follow up: 6 months or sooner as needed  All questions and concerns addressed.  Patient was advised to report any problems requiring medical attention.          1. Chronic heart failure with preserved ejection fraction (HFpEF) (HCC)        2. SSS (sick sinus syndrome) (HCC)        3. Coronary artery disease involving native coronary artery of native heart without angina pectoris        4. Nonrheumatic mitral valve stenosis        5. Mixed hyperlipidemia        6. AVB (atrioventricular block)        7. Non-rheumatic aortic stenosis        8. Primary hypertension        9. History of transcatheter aortic  valve replacement (TAVR)        10. Presence of permanent cardiac pacemaker            HPI: Marisol Otoole is a 84 y.o. year old female with PMH of PAF on Coumadin, SSS s/p MDT on 8/13/2024, severe aortic stenosis s/p TAVR on 10/9/2018, chronic HFpEF, nonobstructive CAD, hypertension, hyperlipidemia, mitral stenosis, tricuspid regurgitation, pulmonary hypertension, JANICE on CPAP who presents for follow-up. Patient is known to Dr. Esteves.    Patient appears to be doing well from a cardiac standpoint. Patient denies any chest pain, shortness of breath, palpitations, or lower extremity edema.  She does not have any cardiac concerns at today's visit.     Patient denies any bleeding issues on Coumadin.      Patient is able to manage her home independently. She has visiting PT. She also has someone at the house to help her if needed and drive her to appointments.     Patient had PPM placement in 8/2024 after episode of near syncope in the office. She is doing much better now.      Patient was instructed to call the office or seek medical attention if any significant chest pain, shortness of breath, palpitations, or lower extremity swelling develop. All medications reviewed and patient is tolerating medications without side effects.     Social history:   Patient denies tobacco, significant alcohol, or recreational drug use.          Patient Active Problem List   Diagnosis    Acquired hypothyroidism    Primary hypertension    Hyperlipidemia    JANICE (obstructive sleep apnea)    LVH (left ventricular hypertrophy)    Mitral annular calcification    Mitral valve stenosis    Pulmonary hypertension (HCC)    Iron deficiency anemia secondary to inadequate dietary iron intake    Morbid obesity due to excess calories (HCC)    Gastroesophageal reflux disease without esophagitis    Primary osteoarthritis of left shoulder    AVB (atrioventricular block)    Chronic heart failure with preserved ejection fraction (HFpEF) (HCC)    COPD, mild  (HCC)    Coronary artery disease involving native coronary artery of native heart without angina pectoris    History of transcatheter aortic valve replacement (TAVR)    Insomnia    Osteopenia    Depression, recurrent (HCC)    Chronic hypoxemic respiratory failure (HCC)    Radiculopathy, lumbar region    Orbital mass    Fall    Leucocytosis    Ambulatory dysfunction    At risk for injury related to fall    Walker as ambulation aid    Urinary frequency    Junctional bradycardia    Anticoagulant long-term use    Paroxysmal atrial fibrillation (HCC)    Presence of permanent cardiac pacemaker    SSS (sick sinus syndrome) (HCC)    Cardiac pacemaker    Non-rheumatic aortic stenosis    Nonrheumatic tricuspid valve regurgitation       Allergies   Allergen Reactions    Latex Rash    Neosporin [Neomycin-Bacitracin Zn-Polymyx] Rash and Other (See Comments)     hives per [facility] order         Current Outpatient Medications:     acetaminophen (TYLENOL) 500 mg tablet, Take 500 mg by mouth every 6 (six) hours as needed, Disp: , Rfl:     aspirin (ECOTRIN LOW STRENGTH) 81 mg EC tablet, Take 1 tablet (81 mg total) by mouth daily, Disp: 100 tablet, Rfl: 0    b complex vitamins capsule, Take 1 capsule by mouth 2 (two) times a day  , Disp: , Rfl:     Blood Glucose Monitoring Suppl (OneTouch Verio Reflect) w/Device KIT, Check blood sugars once daily. Please substitute with appropriate alternative as covered by patient's insurance. Dx: E11.65, Disp: 1 kit, Rfl: 0    Calcium Carb-Cholecalciferol (CALCIUM 600 + D PO), Take 1 tablet by mouth 2 (two) times a day, Disp: , Rfl:     Cranberry 250 MG TABS, Take by mouth, Disp: , Rfl:     ferrous sulfate 324 (65 Fe) mg, Take 1 tablet (324 mg total) by mouth daily before breakfast Take 1 tablet every other day., Disp: , Rfl:     fluticasone (FLONASE) 50 mcg/act nasal spray, 1 spray into each nostril daily, Disp: 16 g, Rfl: 1    furosemide (LASIX) 40 mg tablet, Take 1 tablet (40 mg total) by mouth  daily, Disp: 30 tablet, Rfl: 2    glucose blood (OneTouch Verio) test strip, Check blood sugars once daily. Please substitute with appropriate alternative as covered by patient's insurance. Dx: E11.65, Disp: 100 each, Rfl: 3    Lancets (onetouch ultrasoft) lancets, Use in the morning Use as instructed, Disp: 100 each, Rfl: 1    levothyroxine 50 mcg tablet, TAKE 1 TABLET BY MOUTH EVERY DAY, Disp: 90 tablet, Rfl: 2    omeprazole (PriLOSEC) 40 MG capsule, TAKE 1 CAPSULE BY MOUTH TWICE A DAY, Disp: 180 capsule, Rfl: 1    OneTouch Delica Lancets 33G MISC, Check blood sugars once daily. Please substitute with appropriate alternative as covered by patient's insurance. Dx: E11.65, Disp: 100 each, Rfl: 3    OneTouch Verio test strip, USE 1 EACH IN THE MORNING USE AS INSTRUCTED, Disp: 100 strip, Rfl: 4    oxygen gas, Inhale 2 L/min continuous. Indications: copd, Disp: , Rfl:     pregabalin (LYRICA) 25 mg capsule, Take 1 capsule (25 mg total) by mouth 2 (two) times a day, Disp: 60 capsule, Rfl: 1    sertraline (ZOLOFT) 100 mg tablet, TAKE 1 TABLET BY MOUTH EVERY DAY, Disp: 90 tablet, Rfl: 1    simvastatin (ZOCOR) 40 mg tablet, TAKE 1 TABLET BY MOUTH EVERYDAY AT BEDTIME, Disp: 90 tablet, Rfl: 1    oxybutynin (DITROPAN-XL) 5 mg 24 hr tablet, TAKE 1 TABLET (5 MG TOTAL) BY MOUTH DAILY., Disp: 90 tablet, Rfl: 1    warfarin (Coumadin) 2.5 mg tablet, Take 1 tablet (2.5mg) Mon-Sat. Take 2 tablets (5mg) on Sun or as directed Do not start before August 9, 2024. (Patient taking differently: Take 2.5 mg by mouth daily), Disp: 102 tablet, Rfl: 0    Past Medical History:   Diagnosis Date    Anemia 08/22/2018    Anxiety     Arthritis     AVB (atrioventricular block)     first degree    Cataract     CHF (congestive heart failure) (HCC)     COPD, mild (HCC)     Coronary artery disease     Dislocation of right shoulder joint     Frequent UTI     GERD (gastroesophageal reflux disease)     H/O: pneumonia     Heme positive stool     Hyperlipidemia      Hypertension     Hypothyroidism     Morbid obesity with BMI of 50.0-59.9, adult (HCC)     Obesity, morbid (HCC) 08/22/2018    JANICE on CPAP     Pulmonary hypertension (HCC) 08/22/2018    Severe aortic stenosis     Simple goiter     Skin cyst     within the armpits, right    Wears glasses        Family History   Problem Relation Age of Onset    Diabetes Mother     Stroke Mother     Cancer Father     Lung cancer Father     Diabetes Sister     Heart disease Sister     Hypertension Sister     Coronary artery disease Family     Diabetes Family     Hypertension Family     Cancer Family     Stroke Family     Thyroid disease Neg Hx        Past Surgical History:   Procedure Laterality Date    BREAST BIOPSY      CARDIAC CATHETERIZATION      CARDIAC ELECTROPHYSIOLOGY PROCEDURE N/A 8/12/2024    Procedure: Cardiac pacer implant;  Surgeon: Clarke Zuluaga MD;  Location: [facility];  Service: Cardiology    CARPAL TUNNEL RELEASE Bilateral     CHOLECYSTECTOMY      DILATION AND CURETTAGE OF UTERUS      HYSTEROSCOPY      MASTOID SURGERY      NH COLONOSCOPY FLX DX W/COLLJ SPEC WHEN PFRMD N/A 9/6/2018    Procedure: COLONOSCOPY;  Surgeon: Shree Sosa III, MD;  Location: [facility];  Service: Gastroenterology    NH ECHO TRANSESOPHAG R-T 2D W/PRB IMG ACQUISJ I&R N/A 10/9/2018    Procedure: INTRA-OP TRANSESOPHAGEAL ECHOCARDIOGRAM (GARRISON);  Surgeon: Kushal Camarena DO;  Location: [facility];  Service: Cardiac Surgery    NH ESOPHAGOGASTRODUODENOSCOPY TRANSORAL DIAGNOSTIC N/A 8/31/2018    Procedure: ESOPHAGOGASTRODUODENOSCOPY (EGD);  Surgeon: Shree Sosa III, MD;  Location: [facility];  Service: Gastroenterology    NH REPLACE AORTIC VALVE OPENFEMORAL ARTERY APPROACH N/A 10/9/2018    Procedure: REPLACEMENT AORTIC VALVE TRANSCATHETER (TAVR) TRANSFEMORAL W/ 23 MM MENDOZA NOE S3 VALVE (ACCESS OF LEFT);  Surgeon: Kushal Camarena DO;  Location: [facility];  Service: Cardiac Surgery    TOTAL HIP ARTHROPLASTY Left 2007    TOTAL  KNEE ARTHROPLASTY Bilateral        Social History     Socioeconomic History    Marital status: Single     Spouse name: Not on file    Number of children: Not on file    Years of education: Not on file    Highest education level: Not on file   Occupational History    Occupation: retired   Tobacco Use    Smoking status: Never     Passive exposure: Never    Smokeless tobacco: Never   Vaping Use    Vaping status: Never Used   Substance and Sexual Activity    Alcohol use: Not Currently    Drug use: Never    Sexual activity: Never   Other Topics Concern    Not on file   Social History Narrative    Denied drinking coffee    Denied exercise habits    Most recent tobacco use screenin2018      Do you currently or have you served in the [email or web address]:   No      Were you activated, into active duty, as a member of the [employer] or as a Reservist:   No          Social Drivers of Health     Financial Resource Strain: Low Risk  (10/24/2023)    Overall Financial Resource Strain (CARDIA)     Difficulty of Paying Living Expenses: Not hard at all   Food Insecurity: No Food Insecurity (10/24/2024)    Hunger Vital Sign     Worried About Running Out of Food in the Last Year: Never true     Ran Out of Food in the Last Year: Never true   Transportation Needs: No Transportation Needs (10/24/2024)    PRAPARE - Transportation     Lack of Transportation (Medical): No     Lack of Transportation (Non-Medical): No   Physical Activity: Not on file   Stress: Not on file   Social Connections: Not on file   Intimate Partner Violence: Not on file   Housing Stability: Low Risk  (10/24/2024)    Housing Stability Vital Sign     Unable to Pay for Housing in the Last Year: No     Number of Times Moved in the Last Year: 0     Homeless in the Last Year: No       Review of symptoms:   Review of Systems   Constitutional:  Negative for chills, diaphoresis and fever.   Respiratory:  Negative for cough, chest tightness and shortness of breath.   "  Cardiovascular:  Negative for chest pain, palpitations and leg swelling.   Gastrointestinal:  Negative for abdominal distention, blood in stool, nausea and vomiting.   Genitourinary:  Negative for difficulty urinating.   Musculoskeletal:  Positive for gait problem (uses walker). Negative for arthralgias and back pain.   Neurological:  Negative for dizziness, syncope, light-headedness and headaches.   Psychiatric/Behavioral:  Negative for agitation and confusion. The patient is not nervous/anxious.         Vitals: /60 (BP Location: Right arm, Patient Position: Sitting, Cuff Size: Standard)   Pulse 59   Resp 18   Ht 4' 6\" (1.372 m)   Wt 78.9 kg (174 lb)   SpO2 98%   BMI 41.95 kg/m²         Physical Exam:     Physical Exam  Vitals and nursing note reviewed.   Constitutional:       General: She is not in acute distress.     Appearance: She is well-developed.   HENT:      Head: Normocephalic and atraumatic.   Eyes:      Conjunctiva/sclera: Conjunctivae normal.   Neck:      Vascular: No carotid bruit.   Cardiovascular:      Rate and Rhythm: Normal rate and regular rhythm.      Heart sounds: Normal heart sounds. No murmur heard.  Pulmonary:      Effort: Pulmonary effort is normal. No respiratory distress.      Breath sounds: Normal breath sounds.   Abdominal:      Palpations: Abdomen is soft.      Tenderness: There is no abdominal tenderness.   Musculoskeletal:         General: No swelling.      Cervical back: Neck supple.      Right lower leg: No edema.      Left lower leg: No edema.      Comments: Uses walker to ambulate   Skin:     General: Skin is warm and dry.      Capillary Refill: Capillary refill takes less than 2 seconds.   Neurological:      Mental Status: She is alert and oriented to person, place, and time.   Psychiatric:         Mood and Affect: Mood normal.            Thank you for allowing me to participate in the care and evaluation of your patient.  Should you have any questions, please feel " free to contact me.

## 2025-01-09 NOTE — ASSESSMENT & PLAN NOTE
Patient maintaining sinus rhythm  Not on rate control medication  Continue Coumadin for AC; follows with Coumadin clinic

## 2025-01-09 NOTE — ASSESSMENT & PLAN NOTE
Wt Readings from Last 3 Encounters:   01/09/25 78.9 kg (174 lb)   12/16/24 81.2 kg (179 lb)   10/24/24 79.8 kg (176 lb)     Patient appears euvolemic  Maintain daily weights and low sodium diet.   GDMT: Lasix 40 mg daily

## 2025-01-09 NOTE — PROGRESS NOTES
MDT DC PM/ACTIVE SYSTEM IS MRI CONDITIONAL   DEVICE INTERROGATED IN THE [facility] OFFICE:  BATTERY VOLTAGE ADEQUATE (12.7 YR.).  AP 82.5%  2.7%.  ALL LEAD PARAMETERS WITHIN NORMAL LIMITS.  SINCE 8/29/2024; 3 DEVICE CLASSIFIED VT EPISODES;  PAT ON EGMS (10 @ 154 BPM, 13 @ 160 BPM, 19 @ 154 BPM).  3 SVT-ST EPISODES WITH -158 BPM.  38 AT/AF EPISODES WITH 0.9% BURDEN, AND LONGEST EPISODE 7 H.  PATIENT TAKES WARFARIN.  NO PROGRAMMING CHANGES MADE TO DEVICE PARAMETERS.  NORMAL DEVICE FUNCTION.  RG

## 2025-01-09 NOTE — ASSESSMENT & PLAN NOTE
Patient denies chest pain or anginal equivalent  Medications:  Antiplatelet: Aspirin 81 mg  Beta-blocker: None  Statin: Simvastatin 40 mg daily

## 2025-01-15 ENCOUNTER — TELEPHONE (OUTPATIENT)
Dept: CARDIOLOGY CLINIC | Facility: CLINIC | Age: 85
End: 2025-01-15

## 2025-01-15 NOTE — TELEPHONE ENCOUNTER
Hi,  Requesting a pt/inr home draw to be done around 1/24/25. Please call the pt to set up. Script is in the chart.     Thank you.

## 2025-01-16 ENCOUNTER — TELEPHONE (OUTPATIENT)
Dept: HEMATOLOGY ONCOLOGY | Facility: CLINIC | Age: 85
End: 2025-01-16

## 2025-01-16 NOTE — TELEPHONE ENCOUNTER
Called patient to offer virtual call , per management , appt rescheduled as virtua with Edyta Harris . Appt confirmed

## 2025-01-20 LAB
DME PARACHUTE DELIVERY DATE ACTUAL: NORMAL
DME PARACHUTE DELIVERY DATE REQUESTED: NORMAL
DME PARACHUTE ITEM DESCRIPTION: NORMAL
DME PARACHUTE ORDER STATUS: NORMAL
DME PARACHUTE SUPPLIER NAME: NORMAL
DME PARACHUTE SUPPLIER PHONE: NORMAL

## 2025-01-21 ENCOUNTER — TELEPHONE (OUTPATIENT)
Dept: HEMATOLOGY ONCOLOGY | Facility: CLINIC | Age: 85
End: 2025-01-21

## 2025-01-23 ENCOUNTER — TELEPHONE (OUTPATIENT)
Age: 85
End: 2025-01-23

## 2025-01-23 ENCOUNTER — APPOINTMENT (OUTPATIENT)
Dept: LAB | Facility: HOSPITAL | Age: 85
End: 2025-01-23
Attending: INTERNAL MEDICINE
Payer: MEDICARE

## 2025-01-23 ENCOUNTER — APPOINTMENT (EMERGENCY)
Dept: CT IMAGING | Facility: HOSPITAL | Age: 85
End: 2025-01-23
Payer: MEDICARE

## 2025-01-23 ENCOUNTER — HOSPITAL ENCOUNTER (EMERGENCY)
Facility: HOSPITAL | Age: 85
Discharge: HOME | End: 2025-01-23
Attending: EMERGENCY MEDICINE | Admitting: EMERGENCY MEDICINE
Payer: MEDICARE

## 2025-01-23 VITALS
HEIGHT: 55 IN | DIASTOLIC BLOOD PRESSURE: 63 MMHG | WEIGHT: 173 LBS | TEMPERATURE: 98 F | SYSTOLIC BLOOD PRESSURE: 135 MMHG | BODY MASS INDEX: 40.04 KG/M2 | RESPIRATION RATE: 17 BRPM | OXYGEN SATURATION: 95 % | HEART RATE: 70 BPM
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
## 2025-01-23 DIAGNOSIS — S09.90XA INJURY OF HEAD, INITIAL ENCOUNTER: ICD-10-CM

## 2025-01-23 DIAGNOSIS — Z79.01 LONG TERM (CURRENT) USE OF ANTICOAGULANTS: ICD-10-CM

## 2025-01-23 LAB
ALBUMIN SERPL BCG-MCNC: 3.7 G/DL (ref 3.5–5)
ALP SERPL-CCNC: 70 U/L (ref 34–104)
ALT SERPL W P-5'-P-CCNC: 13 U/L (ref 7–52)
ANION GAP SERPL CALCULATED.3IONS-SCNC: 7 MMOL/L (ref 4–13)
APTT PPP: 67 SECONDS (ref 23–34)
AST SERPL W P-5'-P-CCNC: 20 U/L (ref 13–39)
BASOPHILS # BLD AUTO: 0.06 THOUSANDS/ΜL (ref 0–0.1)
BASOPHILS NFR BLD AUTO: 1 % (ref 0–1)
BILIRUB SERPL-MCNC: 0.4 MG/DL (ref 0.2–1)
BUN SERPL-MCNC: 20 MG/DL (ref 5–25)
CALCIUM SERPL-MCNC: 9.8 MG/DL (ref 8.4–10.2)
CHLORIDE SERPL-SCNC: 102 MMOL/L (ref 96–108)
CO2 SERPL-SCNC: 29 MMOL/L (ref 21–32)
CREAT SERPL-MCNC: 0.72 MG/DL (ref 0.6–1.3)
EOSINOPHIL # BLD AUTO: 0.53 THOUSAND/ΜL (ref 0–0.61)
EOSINOPHIL NFR BLD AUTO: 6 % (ref 0–6)
ERYTHROCYTE [DISTWIDTH] IN BLOOD BY AUTOMATED COUNT: 19.2 % (ref 11.6–15.1)
GFR SERPL CREATININE-BSD FRML MDRD: 76 ML/MIN/1.73SQ M
GLUCOSE SERPL-MCNC: 83 MG/DL (ref 65–140)
HCT VFR BLD AUTO: 36.2 % (ref 34.8–46.1)
HGB BLD-MCNC: 10.7 G/DL (ref 11.5–15.4)
IMM GRANULOCYTES # BLD AUTO: 0.03 THOUSAND/UL (ref 0–0.2)
IMM GRANULOCYTES NFR BLD AUTO: 0 % (ref 0–2)
INR PPP: 2.36 (ref 0.85–1.19)
INR PPP: 2.47 (ref 0.85–1.19)
LYMPHOCYTES # BLD AUTO: 2.18 THOUSANDS/ΜL (ref 0.6–4.47)
LYMPHOCYTES NFR BLD AUTO: 26 % (ref 14–44)
MCH RBC QN AUTO: 24.2 PG (ref 26.8–34.3)
MCHC RBC AUTO-ENTMCNC: 29.6 G/DL (ref 31.4–37.4)
MCV RBC AUTO: 82 FL (ref 82–98)
MONOCYTES # BLD AUTO: 0.86 THOUSAND/ΜL (ref 0.17–1.22)
MONOCYTES NFR BLD AUTO: 10 % (ref 4–12)
NEUTROPHILS # BLD AUTO: 4.66 THOUSANDS/ΜL (ref 1.85–7.62)
NEUTS SEG NFR BLD AUTO: 57 % (ref 43–75)
NRBC BLD AUTO-RTO: 0 /100 WBCS
PLATELET # BLD AUTO: 384 THOUSANDS/UL (ref 149–390)
PMV BLD AUTO: 9 FL (ref 8.9–12.7)
POTASSIUM SERPL-SCNC: 3.9 MMOL/L (ref 3.5–5.3)
PROT SERPL-MCNC: 7.3 G/DL (ref 6.4–8.4)
PROTHROMBIN TIME: 26.5 SECONDS (ref 12.3–15)
PROTHROMBIN TIME: 26.6 SECONDS (ref 12.3–15)
RBC # BLD AUTO: 4.42 MILLION/UL (ref 3.81–5.12)
SODIUM SERPL-SCNC: 138 MMOL/L (ref 135–147)
WBC # BLD AUTO: 8.32 THOUSAND/UL (ref 4.31–10.16)

## 2025-01-23 PROCEDURE — 36415 COLL VENOUS BLD VENIPUNCTURE: CPT

## 2025-01-23 PROCEDURE — 85025 COMPLETE CBC W/AUTO DIFF WBC: CPT | Performed by: EMERGENCY MEDICINE

## 2025-01-23 PROCEDURE — 99284 EMERGENCY DEPT VISIT MOD MDM: CPT

## 2025-01-23 PROCEDURE — 85730 THROMBOPLASTIN TIME PARTIAL: CPT | Performed by: EMERGENCY MEDICINE

## 2025-01-23 PROCEDURE — 85610 PROTHROMBIN TIME: CPT

## 2025-01-23 PROCEDURE — 80053 COMPREHEN METABOLIC PANEL: CPT | Performed by: EMERGENCY MEDICINE

## 2025-01-23 PROCEDURE — 99284 EMERGENCY DEPT VISIT MOD MDM: CPT | Performed by: EMERGENCY MEDICINE

## 2025-01-23 PROCEDURE — 72125 CT NECK SPINE W/O DYE: CPT

## 2025-01-23 PROCEDURE — 70450 CT HEAD/BRAIN W/O DYE: CPT

## 2025-01-23 PROCEDURE — 85610 PROTHROMBIN TIME: CPT | Performed by: EMERGENCY MEDICINE

## 2025-01-23 NOTE — ED PROVIDER NOTES
Time reflects when diagnosis was documented in both MDM as applicable and the Disposition within this note       Time User Action Codes Description Comment    1/23/2025  7:11 PM Deanna Gilbert [W19.XXXA] Fall, initial encounter     1/23/2025  7:11 PM Deanna Gilbert [S09.90XA] Injury of head, initial encounter           ED Disposition       ED Disposition   Discharge    Condition   Stable    Date/Time   u Jan 23, 2025  7:40 PM    Comment   Marisol Otoole discharge to home/self care.                   Assessment & Plan       Medical Decision Making  Amount and/or Complexity of Data Reviewed  Labs: ordered.  Radiology: ordered.      CT scans negative, patient able to ambulate with her walker without difficulty.  Will discharge home to residence.       Medications - No data to display    ED Risk Strat Scores                                              History of Present Illness       Chief Complaint   Patient presents with    Fall     Fell on Monday, tripped and hit head off of her mechanical lift chair. + HS + BT -LOC. Sent by pcp reports mild headache        Past Medical History:   Diagnosis Date    Anemia 08/22/2018    Anxiety     Arthritis     AVB (atrioventricular block)     first degree    Cataract     CHF (congestive heart failure) (HCC)     COPD, mild (HCC)     Coronary artery disease     Dislocation of right shoulder joint     Frequent UTI     GERD (gastroesophageal reflux disease)     H/O: pneumonia     Heme positive stool     Hyperlipidemia     Hypertension     Hypothyroidism     Morbid obesity with BMI of 50.0-59.9, adult (HCC)     Obesity, morbid (HCC) 08/22/2018    JANICE on CPAP     Pulmonary hypertension (HCC) 08/22/2018    Severe aortic stenosis     Simple goiter     Skin cyst     within the armpits, right    Wears glasses       Past Surgical History:   Procedure Laterality Date    BREAST BIOPSY      CARDIAC CATHETERIZATION      CARDIAC ELECTROPHYSIOLOGY PROCEDURE N/A 8/12/2024    Procedure: Cardiac  pacer implant;  Surgeon: Clarke Zuluaga MD;  [facility]: [facility];  Service: Cardiology    CARPAL TUNNEL RELEASE Bilateral     CHOLECYSTECTOMY      DILATION AND CURETTAGE OF UTERUS      HYSTEROSCOPY      MASTOID SURGERY      HI COLONOSCOPY FLX DX W/COLLJ SPEC WHEN PFRMD N/A 9/6/2018    Procedure: COLONOSCOPY;  Surgeon: Shree Sosa III, MD;  Location: [facility];  Service: Gastroenterology    HI ECHO TRANSESOPHAG R-T 2D W/PRB IMG ACQUISJ I&R N/A 10/9/2018    Procedure: INTRA-OP TRANSESOPHAGEAL ECHOCARDIOGRAM (GARRISON);  Surgeon: Kushal Camarena DO;  Location:  MAIN OR;  Service: Cardiac Surgery    HI ESOPHAGOGASTRODUODENOSCOPY TRANSORAL DIAGNOSTIC N/A 8/31/2018    Procedure: ESOPHAGOGASTRODUODENOSCOPY (EGD);  Surgeon: Shree Sosa III, MD;  Location: [facility];  Service: Gastroenterology    HI REPLACE AORTIC VALVE OPENFEMORAL ARTERY APPROACH N/A 10/9/2018    Procedure: REPLACEMENT AORTIC VALVE TRANSCATHETER (TAVR) TRANSFEMORAL W/ 23 MM MENDOZA NOE S3 VALVE (ACCESS OF LEFT);  Surgeon: Kushal Camarena DO;  Location:  MAIN OR;  Service: Cardiac Surgery    TOTAL HIP ARTHROPLASTY Left 2007    TOTAL KNEE ARTHROPLASTY Bilateral       Family History   Problem Relation Age of Onset    Diabetes Mother     Stroke Mother     Cancer Father     Lung cancer Father     Diabetes Sister     Heart disease Sister     Hypertension Sister     Coronary artery disease Family     Diabetes Family     Hypertension Family     Cancer Family     Stroke Family     Thyroid disease Neg Hx       Social History     Tobacco Use    Smoking status: Never     Passive exposure: Never    Smokeless tobacco: Never   Vaping Use    Vaping status: Never Used   Substance Use Topics    Alcohol use: Not Currently    Drug use: Never      E-Cigarette/Vaping    E-Cigarette Use Never User       E-Cigarette/Vaping Substances    Nicotine No     THC No     CBD No     Flavoring No     Other No     Unknown No       I have reviewed and agree with the  history as documented.     HPI  84-year-old female presents with fall 3 days ago.  She states she hit her head off of her mechanical lift chair after she got her foot caught.  She is on blood thinners.  No other injuries.  Review of Systems   Constitutional:  Negative for chills and fever.   HENT:  Negative for dental problem and ear pain.    Eyes:  Negative for pain and redness.   Respiratory:  Negative for cough and shortness of breath.    Cardiovascular:  Negative for chest pain and palpitations.   Gastrointestinal:  Negative for abdominal pain and nausea.   Endocrine: Negative for polydipsia and polyphagia.   Genitourinary:  Negative for dysuria and frequency.   Musculoskeletal:  Negative for arthralgias and joint swelling.   Skin:  Negative for color change and rash.   Neurological:  Positive for headaches. Negative for dizziness.   Psychiatric/Behavioral:  Negative for behavioral problems and confusion.    All other systems reviewed and are negative.          Objective       ED Triage Vitals [01/23/25 1755]   Temperature Pulse Blood Pressure Respirations SpO2 Patient Position - Orthostatic VS   98 °F (36.7 °C) 63 147/65 18 95 % Sitting      Temp Source Heart Rate Source BP Location FiO2 (%) Pain Score    Temporal Monitor Left arm -- --      Vitals      Date and Time Temp Pulse SpO2 Resp BP Pain Score FACES Pain Rating User   01/23/25 1755 98 °F (36.7 °C) 63 95 % 18 147/65 -- -- RO            Physical Exam  Vitals and nursing note reviewed.   Constitutional:       General: She is not in acute distress.     Appearance: She is well-developed. She is not diaphoretic.   HENT:      Head:      Comments: Hematoma to right side of head     Right Ear: External ear normal.      Left Ear: External ear normal.      Nose: Nose normal.   Eyes:      Conjunctiva/sclera: Conjunctivae normal.      Pupils: Pupils are equal, round, and reactive to light.   Neck:      Vascular: No JVD.   Cardiovascular:      Rate and Rhythm: Normal  rate and regular rhythm.      Heart sounds: Normal heart sounds. No murmur heard.  Pulmonary:      Effort: Pulmonary effort is normal. No respiratory distress.      Breath sounds: Normal breath sounds. No wheezing.   Abdominal:      General: Bowel sounds are normal. There is no distension.      Palpations: Abdomen is soft.      Tenderness: There is no abdominal tenderness.   Musculoskeletal:         General: Normal range of motion.      Cervical back: Normal range of motion and neck supple.   Skin:     General: Skin is warm and dry.      Capillary Refill: Capillary refill takes less than 2 seconds.   Neurological:      Mental Status: She is alert and oriented to person, place, and time.      Cranial Nerves: No cranial nerve deficit.   Psychiatric:         Behavior: Behavior normal.         Results Reviewed       Procedure Component Value Units Date/Time    Protime-INR [[id number]]  (Abnormal) Collected: 01/23/25 1834    Lab Status: Final result Specimen: Blood from Arm, Left Updated: 01/1940     Protime 26.5 seconds      INR 2.36    Narrative:      INR Therapeutic Range    Indication                                             INR Range      Atrial Fibrillation                                               2.0-3.0  Hypercoagulable State                                    2.0.2.3  Left Ventricular Asist Device                            2.0-3.0  Mechanical Heart Valve                                  -    Aortic(with afib, MI, embolism, HF, LA enlargement,    and/or coagulopathy)                                     2.0-3.0 (2.5-3.5)     Mitral                                                             2.5-3.5  Prosthetic/Bioprosthetic Heart Valve               2.0-3.0  Venous thromboembolism (VTE: VT, PE        2.0-3.0    APTT [[id number]]  (Abnormal) Collected: 01/23/25 1834    Lab Status: Final result Specimen: Blood from Arm, Left Updated: 01/1940     PTT 67 seconds     Comprehensive metabolic panel  [[id number]] Collected: 01/23/25 1833    Lab Status: Final result Specimen: Blood from Arm, Left Updated: 01/23/25 1904     Sodium 138 mmol/L      Potassium 3.9 mmol/L      Chloride 102 mmol/L      CO2 29 mmol/L      ANION GAP 7 mmol/L      BUN 20 mg/dL      Creatinine 0.72 mg/dL      Glucose 83 mg/dL      Calcium 9.8 mg/dL      AST 20 U/L      ALT 13 U/L      Alkaline Phosphatase 70 U/L      Total Protein 7.3 g/dL      Albumin 3.7 g/dL      Total Bilirubin 0.40 mg/dL      eGFR 76 ml/min/1.73sq m     Narrative:      National Kidney Disease Foundation guidelines for Chronic Kidney Disease (CKD):     Stage 1 with normal or high GFR (GFR > 90 mL/min/1.73 square meters)    Stage 2 Mild CKD (GFR = 60-89 mL/min/1.73 square meters)    Stage 3A Moderate CKD (GFR = 45-59 mL/min/1.73 square meters)    Stage 3B Moderate CKD (GFR = 30-44 mL/min/1.73 square meters)    Stage 4 Severe CKD (GFR = 15-29 mL/min/1.73 square meters)    Stage 5 End Stage CKD (GFR <15 mL/min/1.73 square meters)  Note: GFR calculation is accurate only with a steady state creatinine    CBC and differential [[id number]]  (Abnormal) Collected: 01/23/25 1833    Lab Status: Final result Specimen: Blood from Arm, Left Updated: 01/23/25 1845     WBC 8.32 Thousand/uL      RBC 4.42 Million/uL      Hemoglobin 10.7 g/dL      Hematocrit 36.2 %      MCV 82 fL      MCH 24.2 pg      MCHC 29.6 g/dL      RDW 19.2 %      MPV 9.0 fL      Platelets 384 Thousands/uL      nRBC 0 /100 WBCs      Segmented % 57 %      Immature Grans % 0 %      Lymphocytes % 26 %      Monocytes % 10 %      Eosinophils Relative 6 %      Basophils Relative 1 %      Absolute Neutrophils 4.66 Thousands/µL      Absolute Immature Grans 0.03 Thousand/uL      Absolute Lymphocytes 2.18 Thousands/µL      Absolute Monocytes 0.86 Thousand/µL      Eosinophils Absolute 0.53 Thousand/µL      Basophils Absolute 0.06 Thousands/µL             TRAUMA - CT head wo contrast   Final Interpretation by Chadwick Greenberg,  MD ([id number])      No acute intracranial abnormality.      The study was marked in EPIC for immediate notification.            Workstation performed: [id number]         TRAUMA - CT spine cervical wo contrast   Final Interpretation by Chadwick Greenberg MD ([id number])      No cervical spine fracture or traumatic malalignment.      The study was marked in EPIC for immediate notification.            Workstation performed: [id number]             Procedures    ED Medication and Procedure Management   Prior to Admission Medications   Prescriptions Last Dose Informant Patient Reported? Taking?   Blood Glucose Monitoring Suppl (OneTouch Verio Reflect) w/Device KIT  Self No No   Sig: Check blood sugars once daily. Please substitute with appropriate alternative as covered by patient's insurance. Dx: E11.65   Calcium Carb-Cholecalciferol (CALCIUM 600 + D PO)  Self Yes No   Sig: Take 1 tablet by mouth 2 (two) times a day   Cranberry 250 MG TABS  Self Yes No   Sig: Take by mouth   Lancets (onetouch ultrasoft) lancets  Self No No   Sig: Use in the morning Use as instructed   OneTouch Delica Lancets 33G MISC  Self No No   Sig: Check blood sugars once daily. Please substitute with appropriate alternative as covered by patient's insurance. Dx: E11.65   OneTouch Verio test strip   No No   Sig: USE 1 EACH IN THE MORNING USE AS INSTRUCTED   acetaminophen (TYLENOL) 500 mg tablet  Self Yes No   Sig: Take 500 mg by mouth every 6 (six) hours as needed   aspirin (ECOTRIN LOW STRENGTH) 81 mg EC tablet  Self No No   Sig: Take 1 tablet (81 mg total) by mouth daily   b complex vitamins capsule  Self Yes No   Sig: Take 1 capsule by mouth 2 (two) times a day     ferrous sulfate 324 (65 Fe) mg   No No   Sig: Take 1 tablet (324 mg total) by mouth daily before breakfast Take 1 tablet every other day.   fluticasone (FLONASE) 50 mcg/act nasal spray  Self No No   Si spray into each nostril daily   furosemide (LASIX) 40 mg tablet   No No   Sig:  Take 1 tablet (40 mg total) by mouth daily   glucose blood (OneTouch Verio) test strip  Self No No   Sig: Check blood sugars once daily. Please substitute with appropriate alternative as covered by patient's insurance. Dx: E11.65   levothyroxine 50 mcg tablet   No No   Sig: TAKE 1 TABLET BY MOUTH EVERY DAY   omeprazole (PriLOSEC) 40 MG capsule   No No   Sig: TAKE 1 CAPSULE BY MOUTH TWICE A DAY   oxybutynin (DITROPAN-XL) 5 mg 24 hr tablet  Self No No   Sig: TAKE 1 TABLET (5 MG TOTAL) BY MOUTH DAILY.   oxygen gas  Self Yes No   Sig: Inhale 2 L/min continuous. Indications: copd   pregabalin (LYRICA) 25 mg capsule   No No   Sig: Take 1 capsule (25 mg total) by mouth 2 (two) times a day   sertraline (ZOLOFT) 100 mg tablet   No No   Sig: TAKE 1 TABLET BY MOUTH EVERY DAY   simvastatin (ZOCOR) 40 mg tablet   No No   Sig: TAKE 1 TABLET BY MOUTH EVERYDAY AT BEDTIME   warfarin (Coumadin) 2.5 mg tablet  Self No No   Sig: Take 1 tablet (2.5mg) Mon-Sat. Take 2 tablets (5mg) on Sun or as directed Do not start before August 9, 2024.   Patient taking differently: Take 2.5 mg by mouth daily      Facility-Administered Medications: None     Patient's Medications   Discharge Prescriptions    No medications on file     No discharge procedures on file.  ED SEPSIS DOCUMENTATION   Time reflects when diagnosis was documented in both MDM as applicable and the Disposition within this note       Time User Action Codes Description Comment    1/23/2025  7:11 PM Deanna Gilbert [W19.XXXA] Fall, initial encounter     1/23/2025  7:11 PM Deanna Gilbert [S09.90XA] Injury of head, initial encounter                  Deanna Gilbert MD  01/23/25 1951

## 2025-01-23 NOTE — TELEPHONE ENCOUNTER
Adrienne from [facility] called to update Diya on the patient.  She said the patient had a fall on Monday late afternoon after going to a standing position from sitting on her chair in her bedroom.  Patient has a bump on the top and side of her head.  Her vitals are stable.  They are not going to discharge at this time and will continue therapy.  Once she is stable they will revisit the discharge.  Adrienne is asking if there are any instructions from Diya for the pt.  Adrienne would like a call back at [phone].

## 2025-01-23 NOTE — TELEPHONE ENCOUNTER
Adrienne from [facility] is calling back, now patient has a ringing in her left ear.   Wants to know if she can get an MRI of the brain ,because of the fall or go to the ED?   Call back # [phone]

## 2025-01-24 ENCOUNTER — ANTICOAG VISIT (OUTPATIENT)
Dept: CARDIOLOGY CLINIC | Facility: CLINIC | Age: 85
End: 2025-01-24

## 2025-01-24 ENCOUNTER — TELEPHONE (OUTPATIENT)
Dept: CARDIOLOGY CLINIC | Facility: CLINIC | Age: 85
End: 2025-01-24

## 2025-01-24 NOTE — TELEPHONE ENCOUNTER
Hi,  Requesting a pt/inr home draw to be done around 2/21. Please call the pt to set up. Script is in the chart.     Thank you.

## 2025-01-24 NOTE — ASSESSMENT & PLAN NOTE
Patient is a very pleasant 84-year-old female with a longstanding history of intermittent anemia.  Over the years anemia has been multifactorial  She does have iron deficiency which is likely contributing to ongoing anemia.  Iron deficiency persists despite taking daily oral iron replacement.  At this point, she would benefit from iron infusions  We did discuss IV iron replacement.  Potential side effects of IV iron could include but may not be limited to:  change in taste, diarrhea, muscle cramps, nausea or vomiting, pain in the arms or legs, pain or burning sensation in the injection site, allergic reaction.  The patient verbalized understanding and wishes to proceed.      Orders placed for IV Venofer 200 mg weekly.  This will be given at our [facility] infusion center.  I will arrange for follow-up at our [facility] in 4 months with repeat blood work for ongoing evaluation and determination for additional iron infusions if needed.  Patient is in agreement with this plan.  She is asked to call with any questions or concerns    Orders:    Ambulatory Referral to Hematology / Oncology    Iron Panel (Includes Ferritin, Iron Sat%, Iron, and TIBC); Future

## 2025-01-24 NOTE — PROGRESS NOTES
Name: Marisol Otoole      : 1940      MRN: [id number]  Encounter Provider: MABEL Wilkes  Encounter Date: 2025   Encounter department: [facility]  :  Assessment & Plan  Iron deficiency anemia secondary to inadequate dietary iron intake  Patient is a very pleasant 84-year-old female with a longstanding history of intermittent anemia.  Over the years anemia has been multifactorial  She does have iron deficiency which is likely contributing to ongoing anemia.  Iron deficiency persists despite taking daily oral iron replacement.  At this point, she would benefit from iron infusions  We did discuss IV iron replacement.  Potential side effects of IV iron could include but may not be limited to:  change in taste, diarrhea, muscle cramps, nausea or vomiting, pain in the arms or legs, pain or burning sensation in the injection site, allergic reaction.  The patient verbalized understanding and wishes to proceed.      Orders placed for IV Venofer 200 mg weekly.  This will be given at our [facility] center.  I will arrange for follow-up at our [facility] in 4 months with repeat blood work for ongoing evaluation and determination for additional iron infusions if needed.  Patient is in agreement with this plan.  She is asked to call with any questions or concerns    Orders:    Ambulatory Referral to Hematology / Oncology    Iron Panel (Includes Ferritin, Iron Sat%, Iron, and TIBC); Future    Normocytic anemia    Orders:    CBC and differential; Future    Comprehensive metabolic panel; Future    Iron Panel (Includes Ferritin, Iron Sat%, Iron, and TIBC); Future    Reticulocytes; Future    Vitamin B12; Future    Folate; Future        History of Present Illness       Marisol Dhillon is an 84-year-old female with a history of CAD, SSS status post PPM in 2024, paroxysmal A-fib on Coumadin, nonrheumatic mitral valve stenosis status post TAVR in 2018,  chronic heart failure with preserved EF, hypertension, JANICE on CPAP who is referred to hematology for management of iron deficiency anemia.  She is being seen virtually for initial consultation    Patient has seen hematology in the past, Dr. Christopher Coelho in 2020 for evaluation of anemia.  It was suspected her anemia at that time may have been a result of valve stenosis/hemolysis.  No intervention was recommended other than to continue her oral iron supplement.  Her anemia did resolve and she had normal H&H until 2023.  Her hemoglobin did fluctuate.  She has evidence of persistent normocytic anemia dating back to 7/2024.  She is also had intermittent leukocytosis, thrombocytosis  Over the past year her hemoglobin has fluctuated between 9 and 10  Most recent CBC-D from 1/23/2025 demonstrates hemoglobin 10.7, MCV 82.  White count, platelet count and differential are normal  CMP unremarkable    Iron panel from 12/26/2024 is consistent with decreased iron stores.  Serum iron 24, iron saturation 7%, serum ferritin 30    Patient has been on oral iron twice a day without any improvement in her iron stores.    He does endorse fatigue.  Denies any abnormal bleeding.  She is on Coumadin.  She was evaluated in the ED on 1/23/2025 after sustaining a fall.  Fortunately workup was negative      Medical History Reviewed by provider this encounter:     .      Objective   There were no vitals taken for this visit.    Physical Exam    Labs: I have reviewed the following labs:  Results for orders placed or performed during the hospital encounter of 01/23/25   CBC and differential   Result Value Ref Range    WBC 8.32 4.31 - 10.16 Thousand/uL    RBC 4.42 3.81 - 5.12 Million/uL    Hemoglobin 10.7 (L) 11.5 - 15.4 g/dL    Hematocrit 36.2 34.8 - 46.1 %    MCV 82 82 - 98 fL    MCH 24.2 (L) 26.8 - 34.3 pg    MCHC 29.6 (L) 31.4 - 37.4 g/dL    RDW 19.2 (H) 11.6 - 15.1 %    MPV 9.0 8.9 - 12.7 fL    Platelets 384 149 - 390 Thousands/uL    nRBC 0 /100  WBCs    Segmented % 57 43 - 75 %    Immature Grans % 0 0 - 2 %    Lymphocytes % 26 14 - 44 %    Monocytes % 10 4 - 12 %    Eosinophils Relative 6 0 - 6 %    Basophils Relative 1 0 - 1 %    Absolute Neutrophils 4.66 1.85 - 7.62 Thousands/µL    Absolute Immature Grans 0.03 0.00 - 0.20 Thousand/uL    Absolute Lymphocytes 2.18 0.60 - 4.47 Thousands/µL    Absolute Monocytes 0.86 0.17 - 1.22 Thousand/µL    Eosinophils Absolute 0.53 0.00 - 0.61 Thousand/µL    Basophils Absolute 0.06 0.00 - 0.10 Thousands/µL   Comprehensive metabolic panel   Result Value Ref Range    Sodium 138 135 - 147 mmol/L    Potassium 3.9 3.5 - 5.3 mmol/L    Chloride 102 96 - 108 mmol/L    CO2 29 21 - 32 mmol/L    ANION GAP 7 4 - 13 mmol/L    BUN 20 5 - 25 mg/dL    Creatinine 0.72 0.60 - 1.30 mg/dL    Glucose 83 65 - 140 mg/dL    Calcium 9.8 8.4 - 10.2 mg/dL    AST 20 13 - 39 U/L    ALT 13 7 - 52 U/L    Alkaline Phosphatase 70 34 - 104 U/L    Total Protein 7.3 6.4 - 8.4 g/dL    Albumin 3.7 3.5 - 5.0 g/dL    Total Bilirubin 0.40 0.20 - 1.00 mg/dL    eGFR 76 ml/min/1.73sq m   Protime-INR   Result Value Ref Range    Protime 26.5 (H) 12.3 - 15.0 seconds    INR 2.36 (H) 0.85 - 1.19   APTT   Result Value Ref Range    PTT 67 (H) 23 - 34 seconds     *Note: Due to a large number of results and/or encounters for the requested time period, some results have not been displayed. A complete set of results can be found in Results Review.     Lab Results   Component Value Date/Time    Iron 24 (L) 12/26/2024 07:13 AM    Iron Saturation 7 (L) 12/26/2024 07:13 AM    Ferritin 30 12/26/2024 07:13 AM            Administrative Statements   Encounter provider MABEL Wilkes    The Patient is located at Home and in the following state in which I hold an active license [place].    The patient was identified by name and date of birth. Marisol Otoole was informed that this is a telemedicine visit and that the visit is being conducted through Telephone.  My office door was  closed. No one else was in the room.  She acknowledged consent and understanding of privacy and security of the video platform. The patient has agreed to participate and understands they can discontinue the visit at any time.    I have spent a total time of 30 minutes in caring for this patient on the day of the visit/encounter including Diagnostic results, Instructions for management, Patient and family education, Impressions, Documenting in the medical record, Reviewing / ordering tests, medicine, procedures  , and Obtaining or reviewing history  .

## 2025-01-25 ENCOUNTER — TELEMEDICINE (OUTPATIENT)
Age: 85
End: 2025-01-25
Payer: MEDICARE

## 2025-01-25 DIAGNOSIS — D64.9 NORMOCYTIC ANEMIA: ICD-10-CM

## 2025-01-25 DIAGNOSIS — F41.8 DEPRESSION WITH ANXIETY: ICD-10-CM

## 2025-01-25 DIAGNOSIS — N39.41 URGE INCONTINENCE: ICD-10-CM

## 2025-01-25 DIAGNOSIS — D50.8 IRON DEFICIENCY ANEMIA SECONDARY TO INADEQUATE DIETARY IRON INTAKE: Primary | ICD-10-CM

## 2025-01-25 DIAGNOSIS — D53.9 NUTRITIONAL ANEMIA, UNSPECIFIED: ICD-10-CM

## 2025-01-25 PROCEDURE — 99203 OFFICE O/P NEW LOW 30 MIN: CPT | Performed by: NURSE PRACTITIONER

## 2025-01-25 RX ORDER — SODIUM CHLORIDE 9 MG/ML
20 INJECTION, SOLUTION INTRAVENOUS ONCE
OUTPATIENT
Start: 2025-02-03

## 2025-01-26 RX ORDER — OXYBUTYNIN CHLORIDE 5 MG/1
5 TABLET, EXTENDED RELEASE ORAL DAILY
Qty: 90 TABLET | Refills: 1 | Status: SHIPPED | OUTPATIENT
Start: 2025-01-26

## 2025-01-26 RX ORDER — SERTRALINE HYDROCHLORIDE 100 MG/1
100 TABLET, FILM COATED ORAL DAILY
Qty: 90 TABLET | Refills: 1 | Status: SHIPPED | OUTPATIENT
Start: 2025-01-26

## 2025-01-27 ENCOUNTER — TELEPHONE (OUTPATIENT)
Dept: HEMATOLOGY ONCOLOGY | Facility: CLINIC | Age: 85
End: 2025-01-27

## 2025-01-29 ENCOUNTER — APPOINTMENT (EMERGENCY)
Dept: RADIOLOGY | Facility: HOSPITAL | Age: 85
DRG: 151 | End: 2025-01-29
Payer: MEDICARE

## 2025-01-29 ENCOUNTER — HOSPITAL ENCOUNTER (INPATIENT)
Facility: HOSPITAL | Age: 85
LOS: 1 days | Discharge: HOME | DRG: 151 | End: 2025-01-31
Attending: EMERGENCY MEDICINE | Admitting: INTERNAL MEDICINE
Payer: MEDICARE

## 2025-01-29 DIAGNOSIS — R09.02 HYPOXIA: ICD-10-CM

## 2025-01-29 DIAGNOSIS — I50.9 CONGESTIVE HEART FAILURE (CHF) (HCC): ICD-10-CM

## 2025-01-29 DIAGNOSIS — R04.0 EPISTAXIS: Primary | ICD-10-CM

## 2025-01-29 DIAGNOSIS — I10 PRIMARY HYPERTENSION: ICD-10-CM

## 2025-01-29 PROBLEM — E78.5 HYPERLIPIDEMIA: Status: ACTIVE | Noted: 2017-12-04

## 2025-01-29 PROBLEM — K21.9 GERD (GASTROESOPHAGEAL REFLUX DISEASE): Status: ACTIVE | Noted: 2017-10-29

## 2025-01-29 LAB
2HR DELTA HS TROPONIN: -3 NG/L
ANION GAP SERPL CALCULATED.3IONS-SCNC: 8 MMOL/L (ref 4–13)
APTT PPP: 60 SECONDS (ref 23–34)
BASOPHILS # BLD AUTO: 0.05 THOUSANDS/ΜL (ref 0–0.1)
BASOPHILS NFR BLD AUTO: 1 % (ref 0–1)
BNP SERPL-MCNC: 214 PG/ML (ref 0–100)
BUN SERPL-MCNC: 23 MG/DL (ref 5–25)
CALCIUM SERPL-MCNC: 9.9 MG/DL (ref 8.4–10.2)
CARDIAC TROPONIN I PNL SERPL HS: 6 NG/L (ref ?–50)
CARDIAC TROPONIN I PNL SERPL HS: 9 NG/L (ref ?–50)
CHLORIDE SERPL-SCNC: 102 MMOL/L (ref 96–108)
CO2 SERPL-SCNC: 29 MMOL/L (ref 21–32)
CREAT SERPL-MCNC: 0.81 MG/DL (ref 0.6–1.3)
EOSINOPHIL # BLD AUTO: 0.53 THOUSAND/ΜL (ref 0–0.61)
EOSINOPHIL NFR BLD AUTO: 5 % (ref 0–6)
ERYTHROCYTE [DISTWIDTH] IN BLOOD BY AUTOMATED COUNT: 19.7 % (ref 11.6–15.1)
GFR SERPL CREATININE-BSD FRML MDRD: 66 ML/MIN/1.73SQ M
GLUCOSE SERPL-MCNC: 87 MG/DL (ref 65–140)
HCT VFR BLD AUTO: 36.4 % (ref 34.8–46.1)
HGB BLD-MCNC: 10.9 G/DL (ref 11.5–15.4)
IMM GRANULOCYTES # BLD AUTO: 0.03 THOUSAND/UL (ref 0–0.2)
IMM GRANULOCYTES NFR BLD AUTO: 0 % (ref 0–2)
INR PPP: 2.28 (ref 0.85–1.19)
LYMPHOCYTES # BLD AUTO: 1.73 THOUSANDS/ΜL (ref 0.6–4.47)
LYMPHOCYTES NFR BLD AUTO: 17 % (ref 14–44)
MCH RBC QN AUTO: 24.3 PG (ref 26.8–34.3)
MCHC RBC AUTO-ENTMCNC: 29.9 G/DL (ref 31.4–37.4)
MCV RBC AUTO: 81 FL (ref 82–98)
MONOCYTES # BLD AUTO: 0.95 THOUSAND/ΜL (ref 0.17–1.22)
MONOCYTES NFR BLD AUTO: 9 % (ref 4–12)
NEUTROPHILS # BLD AUTO: 7.05 THOUSANDS/ΜL (ref 1.85–7.62)
NEUTS SEG NFR BLD AUTO: 68 % (ref 43–75)
NRBC BLD AUTO-RTO: 0 /100 WBCS
PLATELET # BLD AUTO: 364 THOUSANDS/UL (ref 149–390)
PMV BLD AUTO: 8.9 FL (ref 8.9–12.7)
POTASSIUM SERPL-SCNC: 4.2 MMOL/L (ref 3.5–5.3)
PROTHROMBIN TIME: 25.8 SECONDS (ref 12.3–15)
RBC # BLD AUTO: 4.49 MILLION/UL (ref 3.81–5.12)
SODIUM SERPL-SCNC: 139 MMOL/L (ref 135–147)
WBC # BLD AUTO: 10.34 THOUSAND/UL (ref 4.31–10.16)

## 2025-01-29 PROCEDURE — 2Y41X5Z PACKING OF NASAL REGION USING PACKING MATERIAL: ICD-10-PCS | Performed by: EMERGENCY MEDICINE

## 2025-01-29 PROCEDURE — 84484 ASSAY OF TROPONIN QUANT: CPT | Performed by: EMERGENCY MEDICINE

## 2025-01-29 PROCEDURE — 80048 BASIC METABOLIC PNL TOTAL CA: CPT

## 2025-01-29 PROCEDURE — 71046 X-RAY EXAM CHEST 2 VIEWS: CPT

## 2025-01-29 PROCEDURE — 85730 THROMBOPLASTIN TIME PARTIAL: CPT

## 2025-01-29 PROCEDURE — 85610 PROTHROMBIN TIME: CPT

## 2025-01-29 PROCEDURE — 85025 COMPLETE CBC W/AUTO DIFF WBC: CPT

## 2025-01-29 PROCEDURE — 99285 EMERGENCY DEPT VISIT HI MDM: CPT

## 2025-01-29 PROCEDURE — 83880 ASSAY OF NATRIURETIC PEPTIDE: CPT | Performed by: EMERGENCY MEDICINE

## 2025-01-29 PROCEDURE — 99223 1ST HOSP IP/OBS HIGH 75: CPT | Performed by: FAMILY MEDICINE

## 2025-01-29 PROCEDURE — 36415 COLL VENOUS BLD VENIPUNCTURE: CPT

## 2025-01-29 PROCEDURE — 30903 CONTROL OF NOSEBLEED: CPT

## 2025-01-29 PROCEDURE — 99284 EMERGENCY DEPT VISIT MOD MDM: CPT

## 2025-01-29 RX ORDER — PANTOPRAZOLE SODIUM 40 MG/1
40 TABLET, DELAYED RELEASE ORAL
Status: DISCONTINUED | OUTPATIENT
Start: 2025-01-29 | End: 2025-01-31 | Stop reason: HOSPADM

## 2025-01-29 RX ORDER — ASPIRIN 81 MG/1
81 TABLET ORAL DAILY
Status: DISCONTINUED | OUTPATIENT
Start: 2025-01-30 | End: 2025-01-31 | Stop reason: HOSPADM

## 2025-01-29 RX ORDER — PREGABALIN 25 MG/1
25 CAPSULE ORAL 2 TIMES DAILY
Status: DISCONTINUED | OUTPATIENT
Start: 2025-01-29 | End: 2025-01-31 | Stop reason: HOSPADM

## 2025-01-29 RX ORDER — OXYMETAZOLINE HYDROCHLORIDE 0.05 G/100ML
2 SPRAY NASAL ONCE
Status: COMPLETED | OUTPATIENT
Start: 2025-01-29 | End: 2025-01-29

## 2025-01-29 RX ORDER — TRANEXAMIC ACID 100 MG/ML
500 INJECTION, SOLUTION INTRAVENOUS ONCE
Status: COMPLETED | OUTPATIENT
Start: 2025-01-29 | End: 2025-01-29

## 2025-01-29 RX ORDER — OXYBUTYNIN CHLORIDE 5 MG/1
5 TABLET, EXTENDED RELEASE ORAL DAILY
Status: DISCONTINUED | OUTPATIENT
Start: 2025-01-29 | End: 2025-01-31 | Stop reason: HOSPADM

## 2025-01-29 RX ORDER — SERTRALINE HYDROCHLORIDE 100 MG/1
100 TABLET, FILM COATED ORAL DAILY
Status: DISCONTINUED | OUTPATIENT
Start: 2025-01-29 | End: 2025-01-31 | Stop reason: HOSPADM

## 2025-01-29 RX ORDER — LEVOTHYROXINE SODIUM 50 UG/1
50 TABLET ORAL DAILY
Status: DISCONTINUED | OUTPATIENT
Start: 2025-01-29 | End: 2025-01-31 | Stop reason: HOSPADM

## 2025-01-29 RX ORDER — ACETAMINOPHEN 325 MG/1
650 TABLET ORAL EVERY 6 HOURS PRN
Status: DISCONTINUED | OUTPATIENT
Start: 2025-01-29 | End: 2025-01-31 | Stop reason: HOSPADM

## 2025-01-29 RX ORDER — PRAVASTATIN SODIUM 80 MG/1
80 TABLET ORAL
Status: DISCONTINUED | OUTPATIENT
Start: 2025-01-29 | End: 2025-01-31 | Stop reason: HOSPADM

## 2025-01-29 RX ORDER — FERROUS SULFATE 325(65) MG
325 TABLET ORAL
Status: DISCONTINUED | OUTPATIENT
Start: 2025-01-30 | End: 2025-01-31 | Stop reason: HOSPADM

## 2025-01-29 RX ORDER — WARFARIN SODIUM 2.5 MG/1
2.5 TABLET ORAL
Status: DISCONTINUED | OUTPATIENT
Start: 2025-01-29 | End: 2025-01-31 | Stop reason: HOSPADM

## 2025-01-29 RX ORDER — ACETAMINOPHEN 325 MG/1
975 TABLET ORAL ONCE
Status: COMPLETED | OUTPATIENT
Start: 2025-01-29 | End: 2025-01-29

## 2025-01-29 RX ORDER — FUROSEMIDE 40 MG/1
40 TABLET ORAL DAILY
Status: DISCONTINUED | OUTPATIENT
Start: 2025-01-29 | End: 2025-01-31 | Stop reason: HOSPADM

## 2025-01-29 RX ADMIN — PHENYLEPHRINE HYDROCHLORIDE 2 SPRAY: 0.25 SPRAY NASAL at 14:01

## 2025-01-29 RX ADMIN — PANTOPRAZOLE SODIUM 40 MG: 40 TABLET, DELAYED RELEASE ORAL at 17:03

## 2025-01-29 RX ADMIN — FUROSEMIDE 40 MG: 40 TABLET ORAL at 17:03

## 2025-01-29 RX ADMIN — ACETAMINOPHEN 975 MG: 325 TABLET, FILM COATED ORAL at 12:20

## 2025-01-29 RX ADMIN — SERTRALINE HYDROCHLORIDE 100 MG: 50 TABLET ORAL at 17:03

## 2025-01-29 RX ADMIN — ACETAMINOPHEN 650 MG: 325 TABLET, FILM COATED ORAL at 19:25

## 2025-01-29 RX ADMIN — OXYMETAZOLINE HYDROCHLORIDE 2 SPRAY: 0.5 SPRAY NASAL at 10:15

## 2025-01-29 RX ADMIN — PREGABALIN 25 MG: 25 CAPSULE ORAL at 17:02

## 2025-01-29 RX ADMIN — OXYBUTYNIN CHLORIDE 5 MG: 5 TABLET, EXTENDED RELEASE ORAL at 17:06

## 2025-01-29 RX ADMIN — PRAVASTATIN SODIUM 80 MG: 80 TABLET ORAL at 17:03

## 2025-01-29 RX ADMIN — TRANEXAMIC ACID 500 MG: 1 INJECTION, SOLUTION INTRAVENOUS at 10:15

## 2025-01-29 RX ADMIN — LEVOTHYROXINE SODIUM 50 MCG: 0.05 TABLET ORAL at 17:03

## 2025-01-29 NOTE — ED ATTENDING ATTESTATION
1/29/2025  I, Adalberto Medrano MD, saw and evaluated the patient. I have discussed the patient with the resident/non-physician practitioner and agree with the resident's/non-physician practitioner's findings, Plan of Care, and MDM as documented in the resident's/non-physician practitioner's note, except where noted. All available labs and Radiology studies were reviewed.  I was present for key portions of any procedure(s) performed by the resident/non-physician practitioner and I was immediately available to provide assistance.       At this point I agree with the current assessment done in the Emergency Department.  I have conducted an independent evaluation of this patient a history and physical is as follows: Patient is an 84-year-old female she reports a history of heart failure, apparently on nighttime oxygen and CPAP presents emergency department patient reports also on Coumadin complains of right-sided nosebleed.  She denies any trauma.  Denies any change in her normal anticoagulation regimen.  Denies any shortness of breath or trauma to the area.  Physical exam shows an awake and alert 84-year-old female in no acute distress, she has significant mount of bleeding from her left nostril.  Patient was suctioned with Calloway suction and no bleeding site could be identified just active bleeding coming down from the septum.  Patient was packed with a initially a Rhino Rocket which was coated with a vasoconstrictor and TXA.  That essentially failed and the patient was subsequently packed twice with 10 cm Merocel packs.  Patient eventually obtained hemostasis.  Fortunately patient requires oxygen at night.  I spoke with otolaryngology Dr. Palacio who reports that since the patient has stopped they would be happy to see the patient in the office on Friday.  Unfortunately patient does not drive and her ministers wife normally can drive her but not today.  .  ED Course     Medical decision making 84-year-old female on  Coumadin and adequately anticoagulated presents with active bleeding in the right nostril requiring several attempts at packing.  Eventually we are able to get hemostasis and the patient there was no further bleeding.  Because the patient requires oxygen and has difficulty breathing at night pulse oximetry dropped into the upper 80s here.  She will require oxygen tonight.  I advised that we observe the patient for hypoxia and further bleeding.  Cussed with hospitalist service.    Critical Care Time  Procedures

## 2025-01-29 NOTE — ASSESSMENT & PLAN NOTE
83 yo lady with underlying history of chronic hypoxic respiratory failure secondary to JANICE/COPD on O2/CPAP, history of A-fib on Coumadin presents with right-sided epistaxis to the ED.  S/p tranexamic acid/Merocel packing in ED with which her bleeding has currently stopped.    Patient remains hemodynamically stable with hemoglobin 10.9  ER provider did speak with ENT on-call Dr. Palacio who recommended outpatient follow-up on Friday which is 1/31/2025  Given patient is O2/CPAP dependent, plan is to admit patient to [facility] and discharge directly for ENT appointment.    Monitor for any further bleeding  Monitor CBC closely  Continue with CPAP [okay per ENT]  ENT will help arrange outpatient appointment.

## 2025-01-29 NOTE — H&P
H&P - Hospitalist   Name: Marisol Otoole 84 y.o. female I MRN: [id number]  Unit/Bed#: ED 20 I Date of Admission: 1/29/2025   Date of Service: 1/29/2025 I Hospital Day: 0     Assessment & Plan  Epistaxis  85 yo lady with underlying history of chronic hypoxic respiratory failure secondary to JANICE/COPD on O2/CPAP, history of A-fib on Coumadin presents with right-sided epistaxis to the ED.  S/p tranexamic acid/Merocel packing in ED with which her bleeding has currently stopped.    Patient remains hemodynamically stable with hemoglobin 10.9  ER provider did speak with ENT on-call Dr. Palacio who recommended outpatient follow-up on Friday which is 1/31/2025  Given patient is O2/CPAP dependent, plan is to admit patient to [facility] and discharge directly for ENT appointment.    Monitor for any further bleeding  Monitor CBC closely  Continue with CPAP [okay per ENT]  ENT will help arrange outpatient appointment.  GERD (gastroesophageal reflux disease)  Continue PPI  Primary hypertension  Blood pressure well-controlled  Continue home Lasix.  Hyperlipidemia  Continue statin.  Iron deficiency anemia secondary to inadequate dietary iron intake  Continue ferrous sulfate supplementation.  Chronic heart failure with preserved ejection fraction (HFpEF) ([facility])  Wt Readings from Last 3 Encounters:   01/23/25 78.5 kg (173 lb)   01/09/25 78.9 kg (174 lb)   12/16/24 81.2 kg (179 lb)       Appears euvolemic  Continue home Lasix  Echo 7/24 notes EF 65%, normal diastolic function  Monitor I's/O, daily weights      JANICE on CPAP  Okay to continue CPAP per ENT provider  Depression, recurrent (HCC)  Continue home Zoloft  Chronic hypoxemic respiratory failure (HCC)  History of JANICE/COPD, dependent on CPAP/O2 at night.  Paroxysmal atrial fibrillation (HCC)  Not on any rate controlling agents  Chronically maintained on Coumadin, INR 2.28 today, continue to monitor INR daily.  Also noted history of TAVR in 2018.  SSS (sick sinus syndrome) ([facility])  History of  SSS s/p pacemaker      VTE Pharmacologic Prophylaxis: VTE Score: 5 coumadin  Code Status: Level 1 - Full Code   Discussion with family: patient    Anticipated Length of Stay: Patient will be admitted on an observation basis with an anticipated length of stay of less than 2 midnights secondary to epistaxis.    History of Present Illness   Chief Complaint: epistaxis    Marisol Otoole is a 84 y.o. female with a PMH of chronic hypoxic respiratory failure secondary to JANICE/COPD, hypertension, hyperlipidemia, CHF, A-fib on warfarin who presents with an episode of epistaxis via right nostril which began this morning.  ED provider attempted packing multiple times and eventually bleeding stopped after he placed Merocel packing/tranexamic acid.  Case was discussed with on-call ENT Dr. Palacio who recommended outpatient follow-up on Friday, 1/31/2025.  However given patient is O2/CPAP dependent, she is being admitted for observation.    Review of Systems   Constitutional:  Negative for chills and fever.   HENT:  Positive for nosebleeds. Negative for ear pain and sore throat.    Eyes:  Negative for pain and visual disturbance.   Respiratory:  Negative for cough and shortness of breath.    Cardiovascular:  Negative for chest pain and palpitations.   Gastrointestinal:  Negative for abdominal pain and vomiting.   Genitourinary:  Negative for dysuria and hematuria.   Musculoskeletal:  Negative for arthralgias and back pain.   Skin:  Negative for color change and rash.   Neurological:  Negative for seizures and syncope.   All other systems reviewed and are negative.      Historical Information   Past Medical History:   Diagnosis Date    Anemia 08/22/2018    Anxiety     Arthritis     AVB (atrioventricular block)     first degree    Cataract     CHF (congestive heart failure) (HCC)     COPD, mild (HCC)     Coronary artery disease     Dislocation of right shoulder joint     Frequent UTI     GERD (gastroesophageal reflux disease)     H/O:  pneumonia     Heme positive stool     Hyperlipidemia     Hypertension     Hypothyroidism     Morbid obesity with BMI of 50.0-59.9, adult (HCC)     Obesity, morbid ([facility]) 08/22/2018    JANICE on CPAP     Pulmonary hypertension ([facility]) 08/22/2018    Severe aortic stenosis     Simple goiter     Skin cyst     within the armpits, right    Wears glasses      Past Surgical History:   Procedure Laterality Date    BREAST BIOPSY      CARDIAC CATHETERIZATION      CARDIAC ELECTROPHYSIOLOGY PROCEDURE N/A 8/12/2024    Procedure: Cardiac pacer implant;  Surgeon: Clarke Zuluaga MD;  Location: [facility];  Service: Cardiology    CARPAL TUNNEL RELEASE Bilateral     CHOLECYSTECTOMY      DILATION AND CURETTAGE OF UTERUS      HYSTEROSCOPY      MASTOID SURGERY      AK COLONOSCOPY FLX DX W/COLLJ SPEC WHEN PFRMD N/A 9/6/2018    Procedure: COLONOSCOPY;  Surgeon: Shree Sosa III, MD;  Location: [facility];  Service: Gastroenterology    AK ECHO TRANSESOPHAG R-T 2D W/PRB IMG ACQUISJ I&R N/A 10/9/2018    Procedure: INTRA-OP TRANSESOPHAGEAL ECHOCARDIOGRAM (GARRISON);  Surgeon: Kushal Camarena DO;  Location: [facility];  Service: Cardiac Surgery    AK ESOPHAGOGASTRODUODENOSCOPY TRANSORAL DIAGNOSTIC N/A 8/31/2018    Procedure: ESOPHAGOGASTRODUODENOSCOPY (EGD);  Surgeon: Shree Sosa III, MD;  Location: [facility];  Service: Gastroenterology    AK REPLACE AORTIC VALVE OPENFEMORAL ARTERY APPROACH N/A 10/9/2018    Procedure: REPLACEMENT AORTIC VALVE TRANSCATHETER (TAVR) TRANSFEMORAL W/ 23 MM MENDOZA NOE S3 VALVE (ACCESS OF LEFT);  Surgeon: Kushal Camarena DO;  Location: [facility];  Service: Cardiac Surgery    TOTAL HIP ARTHROPLASTY Left 2007    TOTAL KNEE ARTHROPLASTY Bilateral      Social History     Tobacco Use    Smoking status: Never     Passive exposure: Never    Smokeless tobacco: Never   Vaping Use    Vaping status: Never Used   Substance and Sexual Activity    Alcohol use: Not Currently    Drug use: Never    Sexual activity:  Never     E-Cigarette/Vaping    E-Cigarette Use Never User      E-Cigarette/Vaping Substances    Nicotine No     THC No     CBD No     Flavoring No     Other No     Unknown No      Family history non-contributory  Social History:  Marital Status: Single       Meds/Allergies   I have reviewed home medications using recent Epic encounter.  Prior to Admission medications    Medication Sig Start Date End Date Taking? Authorizing Provider   acetaminophen (TYLENOL) 500 mg tablet Take 500 mg by mouth every 6 (six) hours as needed    Historical Provider, MD   aspirin (ECOTRIN LOW STRENGTH) 81 mg EC tablet Take 1 tablet (81 mg total) by mouth daily 10/11/18   Fatou Schmitz PA-C   b complex vitamins capsule Take 1 capsule by mouth 2 (two) times a day      Historical Provider, MD   Blood Glucose Monitoring Suppl (OneTouch Verio Reflect) w/Device KIT Check blood sugars once daily. Please substitute with appropriate alternative as covered by patient's insurance. Dx: E11.65 6/28/24   MABEL Donahue   Calcium Carb-Cholecalciferol (CALCIUM 600 + D PO) Take 1 tablet by mouth 2 (two) times a day    Historical Provider, MD   Cranberry 250 MG TABS Take by mouth    Historical Provider, MD   ferrous sulfate 324 (65 Fe) mg Take 1 tablet (324 mg total) by mouth daily before breakfast Take 1 tablet every other day. 10/24/24   MABEL Hallman   fluticasone (FLONASE) 50 mcg/act nasal spray 1 spray into each nostril daily 8/22/24   MABEL Hallman   furosemide (LASIX) 40 mg tablet Take 1 tablet (40 mg total) by mouth daily 11/20/24   Bro Esteves MD   glucose blood (OneTouch Verio) test strip Check blood sugars once daily. Please substitute with appropriate alternative as covered by patient's insurance. Dx: E11.65 6/28/24   MABEL Donahue   Lancets (onetouch ultrasoft) lancets Use in the morning Use as instructed 6/28/24   MABEL Donahue   levothyroxine 50 mcg tablet TAKE 1 TABLET BY MOUTH EVERY DAY 1/7/25   Diya  MABEL Bowie   omeprazole (PriLOSEC) 40 MG capsule TAKE 1 CAPSULE BY MOUTH TWICE A DAY 11/1/24   MABEL Hallman Delica Lancets 33G MISC Check blood sugars once daily. Please substitute with appropriate alternative as covered by patient's insurance. Dx: E11.65 6/28/24   MABEL Donahue Verio test strip USE 1 EACH IN THE MORNING USE AS INSTRUCTED 10/31/24   MABEL Hallman   oxybutynin (DITROPAN-XL) 5 mg 24 hr tablet TAKE 1 TABLET (5 MG TOTAL) BY MOUTH DAILY. 1/26/25   MABEL Hallman   oxygen gas Inhale 2 L/min continuous. Indications: copd    Historical Provider, MD   pregabalin (LYRICA) 25 mg capsule Take 1 capsule (25 mg total) by mouth 2 (two) times a day 12/9/24   Juan Montero MD   sertraline (ZOLOFT) 100 mg tablet TAKE 1 TABLET BY MOUTH EVERY DAY 1/26/25   MABEL Hallman   simvastatin (ZOCOR) 40 mg tablet TAKE 1 TABLET BY MOUTH EVERYDAY AT BEDTIME 11/1/24   MABEL Donahue   warfarin (Coumadin) 2.5 mg tablet Take 1 tablet (2.5mg) Mon-Sat. Take 2 tablets (5mg) on Sun or as directed Do not start before August 9, 2024.  Patient taking differently: Take 2.5 mg by mouth daily 8/9/24   MABEL Perez   cyclobenzaprine (FLEXERIL) 5 mg tablet Take 1 tablet (5 mg total) by mouth 2 (two) times a day as needed for muscle spasms 8/30/22 9/5/22  MABEL Hallman   meloxicam (Mobic) 15 mg tablet Take 1 tablet (15 mg total) by mouth daily 8/30/22 9/5/22  MABEL Hallman     Allergies   Allergen Reactions    Latex Rash    Neosporin [Neomycin-Bacitracin Zn-Polymyx] Rash and Other (See Comments)     hives per [facility] order       Objective :  Temp:  [98.5 °F (36.9 °C)] 98.5 °F (36.9 °C)  HR:  [59-67] 60  BP: (145-185)/(65-77) 147/67  Resp:  [16-19] 18  SpO2:  [89 %-92 %] 92 %  O2 Device: None (Room air)    Physical Exam  Vitals and nursing note reviewed.   Constitutional:       General: She is not in acute distress.     Appearance: She is well-developed.   HENT:      Head:  Normocephalic and atraumatic.      Nose:      Comments: Packing in right nostril  Eyes:      Conjunctiva/sclera: Conjunctivae normal.   Cardiovascular:      Rate and Rhythm: Normal rate and regular rhythm.      Heart sounds: No murmur heard.  Pulmonary:      Effort: Pulmonary effort is normal. No respiratory distress.      Breath sounds: Normal breath sounds.   Abdominal:      Palpations: Abdomen is soft.      Tenderness: There is no abdominal tenderness.   Musculoskeletal:         General: No swelling.      Cervical back: Neck supple.   Skin:     General: Skin is warm and dry.   Neurological:      General: No focal deficit present.      Mental Status: She is alert and oriented to person, place, and time.   Psychiatric:         Mood and Affect: Mood normal.          Lines/Drains:      Lab Results: I have reviewed the following results:  Results from last 7 days   Lab Units 01/29/25  1020   WBC Thousand/uL 10.34*   HEMOGLOBIN g/dL 10.9*   HEMATOCRIT % 36.4   PLATELETS Thousands/uL 364   SEGS PCT % 68   LYMPHO PCT % 17   MONO PCT % 9   EOS PCT % 5     Results from last 7 days   Lab Units 01/29/25  1020 01/23/25  1833   SODIUM mmol/L 139 138   POTASSIUM mmol/L 4.2 3.9   CHLORIDE mmol/L 102 102   CO2 mmol/L 29 29   BUN mg/dL 23 20   CREATININE mg/dL 0.81 0.72   ANION GAP mmol/L 8 7   CALCIUM mg/dL 9.9 9.8   ALBUMIN g/dL  --  3.7   TOTAL BILIRUBIN mg/dL  --  0.40   ALK PHOS U/L  --  70   ALT U/L  --  13   AST U/L  --  20   GLUCOSE RANDOM mg/dL 87 83     Results from last 7 days   Lab Units 01/29/25  1020   INR  2.28*         Lab Results   Component Value Date    HGBA1C 6.1 (H) 08/18/2023    HGBA1C 5.7 (H) 07/11/2023    HGBA1C 5.9 (H) 03/17/2023               Administrative Statements   I have spent a total time of 75 minutes in caring for this patient on the day of the visit/encounter including Diagnostic results, Patient and family education, Counseling / Coordination of care, Documenting in the medical record,  Reviewing / ordering tests, medicine, procedures  , Obtaining or reviewing history  , and Communicating with other healthcare professionals .    ** Please Note: This note has been constructed using a voice recognition system. **

## 2025-01-29 NOTE — ED PROVIDER NOTES
Time reflects when diagnosis was documented in both MDM as applicable and the Disposition within this note       Time User Action Codes Description Comment    1/29/2025  2:49 PM Adalberto Medrano [R04.0] Epistaxis     1/29/2025  2:49 PM Adalberto Medrano [I50.9] Congestive heart failure (CHF) (HCC)     1/29/2025  2:50 PM Adalberto Medrano [R09.02] Hypoxia           ED Disposition       ED Disposition   Admit    Condition   Stable    Date/Time   Wed Jan 29, 2025  2:50 PM    Comment   Case was discussed with hospitalist and the patient's admission status was agreed to be Admission Status: observation status to the service of Dr. Bogdan Escalante.               Assessment & Plan       Medical Decision Making  Vital signs stable.  Afebrile.  Patient is awake and alert and in no acute distress.  Patient pleasant and cooperative.  Patient's clothes are stained with blood.  Blood visualized in bilateral naris with active bleeding in the right nare.  Blood clots in the posterior pharynx.    Epistaxis managed with anterior packing soaked in TXA, Afrin, and norepinephrine.  Bleeding controlled after third attempt.  Patient states that she is on oxygen at night when sleeping.  Discussed with patient regarding observing her overnight due to her need for oxygen and her need for packing for 3 days.  Discussed patient's case with ENT who agreed to see her outpatient in the office.    Case discussed with Dr. Medrano.  Patient's case discussed with Dr. Escalante who agreed to admit the patient for observation.  Discussed assessment and plan with patient who verbalizes understanding and agrees with plan.    Amount and/or Complexity of Data Reviewed  Labs: ordered. Decision-making details documented in ED Course.  Radiology: ordered.    Risk  OTC drugs.  Prescription drug management.  Decision regarding hospitalization.        ED Course as of 01/29/25 1738   Wed Jan 29, 2025   0956 Blood Pressure: 145/65   0957 Temperature: 98.5 °F (36.9 °C)    0957 Pulse: 63   0957 Respirations: 19   0957 SpO2: 90 %   1033 CBC and differential(!)  Leukocytosis.  Anemia at baseline.   1130 PTT(!): 60   1131 PROTIME(!): 25.8   1131 POCT INR(!): 2.28   1131 Basic metabolic panel  Normal.       Medications   acetaminophen (TYLENOL) tablet 650 mg (has no administration in time range)   aspirin (ECOTRIN LOW STRENGTH) EC tablet 81 mg (has no administration in time range)   ferrous sulfate tablet 325 mg (has no administration in time range)   furosemide (LASIX) tablet 40 mg (40 mg Oral Given 1/29/25 1703)   levothyroxine tablet 50 mcg (50 mcg Oral Given 1/29/25 1703)   oxybutynin (DITROPAN-XL) 24 hr tablet 5 mg (5 mg Oral Given 1/29/25 1706)   pantoprazole (PROTONIX) EC tablet 40 mg (40 mg Oral Given 1/29/25 1703)   pregabalin (LYRICA) capsule 25 mg (25 mg Oral Given 1/29/25 1702)   sertraline (ZOLOFT) tablet 100 mg (100 mg Oral Given 1/29/25 1703)   pravastatin (PRAVACHOL) tablet 80 mg (80 mg Oral Given 1/29/25 1703)   warfarin (COUMADIN) tablet 2.5 mg (0 mg Oral Hold 1/29/25 1702)   tranexamic acid 100mg/mL (TOPICAL/EPISTAXIS) 500 mg (500 mg Nasal Given by Other 1/29/25 1015)   oxymetazoline (AFRIN) 0.05 % nasal spray 2 spray (2 sprays Each Nare Given by Other 1/29/25 1015)   acetaminophen (TYLENOL) tablet 975 mg (975 mg Oral Given 1/29/25 1220)   phenylephrine (LAURYN-SYNEPHRINE) 0.25 % nasal spray 2 spray (2 sprays Each Nare Given 1/29/25 1401)       ED Risk Strat Scores                                              History of Present Illness       Chief Complaint   Patient presents with    Nose Bleed     Right nostril bleed that began overnight. +thinners       Past Medical History:   Diagnosis Date    Anemia 08/22/2018    Anxiety     Arthritis     AVB (atrioventricular block)     first degree    Cataract     CHF (congestive heart failure) (HCC)     COPD, mild (HCC)     Coronary artery disease     Dislocation of right shoulder joint     Frequent UTI     GERD (gastroesophageal  reflux disease)     H/O: pneumonia     Heme positive stool     Hyperlipidemia     Hypertension     Hypothyroidism     Morbid obesity with BMI of 50.0-59.9, adult (HCC)     Obesity, morbid (HCC) 08/22/2018    JANICE on CPAP     Pulmonary hypertension (HCC) 08/22/2018    Severe aortic stenosis     Simple goiter     Skin cyst     within the armpits, right    Wears glasses       Past Surgical History:   Procedure Laterality Date    BREAST BIOPSY      CARDIAC CATHETERIZATION      CARDIAC ELECTROPHYSIOLOGY PROCEDURE N/A 8/12/2024    Procedure: Cardiac pacer implant;  Surgeon: Clarke Zuluaga MD;  Location:  CARDIAC CATH LAB;  Service: Cardiology    CARPAL TUNNEL RELEASE Bilateral     CHOLECYSTECTOMY      DILATION AND CURETTAGE OF UTERUS      HYSTEROSCOPY      MASTOID SURGERY      KS COLONOSCOPY FLX DX W/COLLJ SPEC WHEN PFRMD N/A 9/6/2018    Procedure: COLONOSCOPY;  Surgeon: Shree Sosa III, MD;  Location: [facility];  Service: Gastroenterology    KS ECHO TRANSESOPHAG R-T 2D W/PRB IMG ACQUISJ I&R N/A 10/9/2018    Procedure: INTRA-OP TRANSESOPHAGEAL ECHOCARDIOGRAM (GARRISON);  Surgeon: Kushal Camarena DO;  Location:  MAIN OR;  Service: Cardiac Surgery    KS ESOPHAGOGASTRODUODENOSCOPY TRANSORAL DIAGNOSTIC N/A 8/31/2018    Procedure: ESOPHAGOGASTRODUODENOSCOPY (EGD);  Surgeon: Shree Sosa III, MD;  Location: [facility];  Service: Gastroenterology    KS REPLACE AORTIC VALVE OPENFEMORAL ARTERY APPROACH N/A 10/9/2018    Procedure: REPLACEMENT AORTIC VALVE TRANSCATHETER (TAVR) TRANSFEMORAL W/ 23 MM MENDOZA NOE S3 VALVE (ACCESS OF LEFT);  Surgeon: Kushal Camarena DO;  Location: [facility];  Service: Cardiac Surgery    TOTAL HIP ARTHROPLASTY Left 2007    TOTAL KNEE ARTHROPLASTY Bilateral       Family History   Problem Relation Age of Onset    Diabetes Mother     Stroke Mother     Cancer Father     Lung cancer Father     Diabetes Sister     Heart disease Sister     Hypertension Sister     Coronary artery disease Family      Diabetes Family     Hypertension Family     Cancer Family     Stroke Family     Thyroid disease Neg Hx       Social History     Tobacco Use    Smoking status: Never     Passive exposure: Never    Smokeless tobacco: Never   Vaping Use    Vaping status: Never Used   Substance Use Topics    Alcohol use: Not Currently    Drug use: Never      E-Cigarette/Vaping    E-Cigarette Use Never User       E-Cigarette/Vaping Substances    Nicotine No     THC No     CBD No     Flavoring No     Other No     Unknown No       I have reviewed and agree with the history as documented.     Patient is a 84-year-old female with a history of CHF, JANICE, COPD, hypertension, hyperlipidemia, GERD, hypertension, aortic stenosis, hypothyroidism, anemia, and anxiety who presents complaining of right sided nosebleed onset last night.  Patient states she is on Coumadin and has been taking it appropriately.  Patient states the nosebleed started last night she placed a cottonball the bleeding stopped when she woke up and removed the cottonball the bleeding returned.  Patient has had nosebleeds in the past and has required packing in the past.  Patient denies any fever, chills, recent illnesses, chest pain, shortness of breath, lightheadedness, or any other symptoms at this time.      History provided by:  Patient   used: No        Review of Systems   Constitutional: Negative.  Negative for chills and fever.   HENT:  Positive for nosebleeds and postnasal drip. Negative for sore throat.    Respiratory: Negative.  Negative for shortness of breath.    Cardiovascular: Negative.  Negative for chest pain, palpitations and leg swelling.   Gastrointestinal: Negative.  Negative for abdominal pain.   Musculoskeletal: Negative.    Skin: Negative.  Negative for color change and rash.   Neurological:  Positive for headaches. Negative for dizziness, syncope and light-headedness.   All other systems reviewed and are negative.          Objective        ED Triage Vitals [01/29/25 0903]   Temperature Pulse Blood Pressure Respirations SpO2 Patient Position - Orthostatic VS   98.5 °F (36.9 °C) 63 145/65 19 90 % Sitting      Temp Source Heart Rate Source BP Location FiO2 (%) Pain Score    Temporal Monitor Left arm -- --      Vitals      Date and Time Temp Pulse SpO2 Resp BP Pain Score FACES Pain Rating User   01/29/25 1400 -- 60 92 % 18 147/67 -- --    01/29/25 1330 -- 59 91 % 18 185/77 -- --    01/29/25 1300 -- 64 89 % -- 158/72 -- --    01/29/25 1247 -- 67 91 % 16 167/72 -- --    01/29/25 0903 98.5 °F (36.9 °C) 63 90 % 19 145/65 -- -- GP            Physical Exam  Vitals and nursing note reviewed.   Constitutional:       General: She is awake. She is not in acute distress.     Appearance: Normal appearance. She is well-developed and well-groomed. She is not ill-appearing, toxic-appearing or diaphoretic.      Comments: Patient is resting comfortably on bed, in no acute distress.  Patients clothes are stained with blood.  Pleasant and cooperative.   HENT:      Head: Normocephalic and atraumatic.      Jaw: There is normal jaw occlusion.      Right Ear: Tympanic membrane, ear canal and external ear normal.      Left Ear: Tympanic membrane, ear canal and external ear normal.      Nose:      Right Nostril: Epistaxis present. No septal hematoma or occlusion.      Left Nostril: Epistaxis present. No septal hematoma or occlusion.      Comments: Blood in the bilateral nares.  Active bleeding from right nare.     Mouth/Throat:      Lips: Pink.      Mouth: Mucous membranes are moist.      Tongue: No lesions. Tongue does not deviate from midline.      Palate: No mass and lesions.      Pharynx: Oropharynx is clear. Uvula midline. No pharyngeal swelling, oropharyngeal exudate, posterior oropharyngeal erythema or uvula swelling.      Tonsils: No tonsillar exudate or tonsillar abscesses.      Comments: Postnasal drip of blood. Blood clots in oropharynx.   Eyes:       General: Lids are normal.      Extraocular Movements: Extraocular movements intact.      Conjunctiva/sclera: Conjunctivae normal.   Cardiovascular:      Rate and Rhythm: Normal rate.      Heart sounds: Normal heart sounds. No murmur heard.  Pulmonary:      Effort: Pulmonary effort is normal. No tachypnea or respiratory distress.      Breath sounds: Normal breath sounds and air entry. No stridor. No decreased breath sounds, wheezing, rhonchi or rales.   Musculoskeletal:         General: No swelling. Normal range of motion.      Cervical back: Normal range of motion.   Skin:     General: Skin is warm and dry.      Capillary Refill: Capillary refill takes less than 2 seconds.      Coloration: Skin is not pale.   Neurological:      General: No focal deficit present.      Mental Status: She is alert and oriented to person, place, and time.      GCS: GCS eye subscore is 4. GCS verbal subscore is 5. GCS motor subscore is 6.   Psychiatric:         Attention and Perception: Attention normal.         Mood and Affect: Mood normal.         Speech: Speech normal.         Behavior: Behavior normal. Behavior is cooperative.         Results Reviewed       Procedure Component Value Units Date/Time    HS Troponin I 2hr [[id number]] Collected: 01/29/25 1712    Lab Status: In process Specimen: Blood from Arm, Left Updated: 01/29/25 1736    HS Troponin I 4hr [[id number]]     Lab Status: No result Specimen: Blood     B-Type Natriuretic Peptide(BNP) [[id number]]  (Abnormal) Collected: 01/29/25 1423    Lab Status: Final result Specimen: Blood from Arm, Right Updated: 01/29/25 1553      pg/mL     HS Troponin 0hr (reflex protocol) [[id number]]  (Normal) Collected: 01/29/25 1423    Lab Status: Final result Specimen: Blood from Arm, Right Updated: 01/29/25 1454     hs TnI 0hr 9 ng/L     Basic metabolic panel [[id number]] Collected: 01/29/25 1020    Lab Status: Final result Specimen: Blood from Arm, Left Updated: 01/29/25 1058     Sodium 139  mmol/L      Potassium 4.2 mmol/L      Chloride 102 mmol/L      CO2 29 mmol/L      ANION GAP 8 mmol/L      BUN 23 mg/dL      Creatinine 0.81 mg/dL      Glucose 87 mg/dL      Calcium 9.9 mg/dL      eGFR 66 ml/min/1.73sq m     Narrative:      [employer] Kid[employer] guidelines for Chronic Kidney Disease (CKD):     Stage 1 with normal or high GFR (GFR > 90 mL/min/1.73 square meters)    Stage 2 Mild CKD (GFR = 60-89 mL/min/1.73 square meters)    Stage 3A Moderate CKD (GFR = 45-59 mL/min/1.73 square meters)    Stage 3B Moderate CKD (GFR = 30-44 mL/min/1.73 square meters)    Stage 4 Severe CKD (GFR = 15-29 mL/min/1.73 square meters)    Stage 5 End Stage CKD (GFR <15 mL/min/1.73 square meters)  Note: GFR calculation is accurate only with a steady state creatinine    Protime-INR [[id number]]  (Abnormal) Collected: 01/29/25 1020    Lab Status: Final result Specimen: Blood from Arm, Left Updated: 01/29/25 1041     Protime 25.8 seconds      INR 2.28    Narrative:      INR Therapeutic Range    Indication                                             INR Range      Atrial Fibrillation                                               2.0-3.0  Hypercoagulable State                                    2.0.2.3  Left Ventricular Asist Device                            2.0-3.0  Mechanical Heart Valve                                  -    Aortic(with afib, MI, embolism, HF, LA enlargement,    and/or coagulopathy)                                     2.0-3.0 (2.5-3.5)     Mitral                                                             2.5-3.5  Prosthetic/Bioprosthetic Heart Valve               2.0-3.0  Venous thromboembolism (VTE: VT, PE        2.0-3.0    APTT [[id number]]  (Abnormal) Collected: 01/29/25 1020    Lab Status: Final result Specimen: Blood from Arm, Left Updated: 01/29/25 1041     PTT 60 seconds     CBC and differential [[id number]]  (Abnormal) Collected: 01/29/25 1020    Lab Status: Final result Specimen: Blood from  "Arm, Left Updated: 01/29/25 1026     WBC 10.34 Thousand/uL      RBC 4.49 Million/uL      Hemoglobin 10.9 g/dL      Hematocrit 36.4 %      MCV 81 fL      MCH 24.3 pg      MCHC 29.9 g/dL      RDW 19.7 %      MPV 8.9 fL      Platelets 364 Thousands/uL      nRBC 0 /100 WBCs      Segmented % 68 %      Immature Grans % 0 %      Lymphocytes % 17 %      Monocytes % 9 %      Eosinophils Relative 5 %      Basophils Relative 1 %      Absolute Neutrophils 7.05 Thousands/µL      Absolute Immature Grans 0.03 Thousand/uL      Absolute Lymphocytes 1.73 Thousands/µL      Absolute Monocytes 0.95 Thousand/µL      Eosinophils Absolute 0.53 Thousand/µL      Basophils Absolute 0.05 Thousands/µL             XR chest 2 views   Final Interpretation by Cedric Tavares MD (01/29 7076)      No focal consolidation, pleural effusion, or pneumothorax.            Resident: Ze Colunga I, the attending radiologist, have reviewed the images and agree with the final report above.      Workstation performed: [id number]             Epistaxis management    Date/Time: 1/29/2025 10:20 AM    Performed by: Norma Marley PA-C  Authorized by: Adalberto Medrano MD  Universal Protocol:  Procedure performed by: (Dr. Medrano)  Consent: Verbal consent obtained.  Risks and benefits: risks, benefits and alternatives were discussed  Consent given by: patient  Time out: Immediately prior to procedure a \"time out\" was called to verify the correct patient, procedure, equipment, support staff and site/side marked as required.  Timeout called at: 1/29/2025 10:20 AM.  Patient understanding: patient states understanding of the procedure being performed  Patient consent: the patient's understanding of the procedure matches consent given  Procedure consent: procedure consent matches procedure scheduled  Relevant documents: relevant documents present and verified  Test results: test results available and properly labeled  Site marked: the operative site was " marked  Radiology Images displayed and confirmed. If images not available, report reviewed: imaging studies available  Required items: required blood products, implants, devices, and special equipment available  Patient identity confirmed: verbally with patient    Patient location:  ED  Anesthesia (see MAR for exact dosages):     Anesthesia method:  None  Procedure details:     Treatment site:  R anterior    Hemostasis method:  Anterior pack and other (comment) (TXA, afrin, and norepinephrine)    Approach:  External    Spec Headlamp used: Yes      Treatment complexity:  Extensive    Treatment episode: initial    Post-procedure details:     Assessment:  Bleeding decreased    Patient tolerance of procedure:  Tolerated well, no immediate complications      ED Medication and Procedure Management   Prior to Admission Medications   Prescriptions Last Dose Informant Patient Reported? Taking?   Blood Glucose Monitoring Suppl (OneTouch Verio Reflect) w/Device KIT  Self No No   Sig: Check blood sugars once daily. Please substitute with appropriate alternative as covered by patient's insurance. Dx: E11.65   Calcium Carb-Cholecalciferol (CALCIUM 600 + D PO)  Self Yes No   Sig: Take 1 tablet by mouth 2 (two) times a day   Cranberry 250 MG TABS  Self Yes No   Sig: Take by mouth   Lancets (onetouch ultrasoft) lancets  Self No No   Sig: Use in the morning Use as instructed   OneTouch Delica Lancets 33G MISC  Self No No   Sig: Check blood sugars once daily. Please substitute with appropriate alternative as covered by patient's insurance. Dx: E11.65   OneTouch Verio test strip   No No   Sig: USE 1 EACH IN THE MORNING USE AS INSTRUCTED   acetaminophen (TYLENOL) 500 mg tablet  Self Yes No   Sig: Take 500 mg by mouth every 6 (six) hours as needed   aspirin (ECOTRIN LOW STRENGTH) 81 mg EC tablet  Self No No   Sig: Take 1 tablet (81 mg total) by mouth daily   b complex vitamins capsule  Self Yes No   Sig: Take 1 capsule by mouth 2 (two)  times a day     ferrous sulfate 324 (65 Fe) mg   No No   Sig: Take 1 tablet (324 mg total) by mouth daily before breakfast Take 1 tablet every other day.   fluticasone (FLONASE) 50 mcg/act nasal spray  Self No No   Si spray into each nostril daily   furosemide (LASIX) 40 mg tablet   No No   Sig: Take 1 tablet (40 mg total) by mouth daily   glucose blood (OneTouch Verio) test strip  Self No No   Sig: Check blood sugars once daily. Please substitute with appropriate alternative as covered by patient's insurance. Dx: E11.65   levothyroxine 50 mcg tablet   No No   Sig: TAKE 1 TABLET BY MOUTH EVERY DAY   omeprazole (PriLOSEC) 40 MG capsule   No No   Sig: TAKE 1 CAPSULE BY MOUTH TWICE A DAY   oxybutynin (DITROPAN-XL) 5 mg 24 hr tablet   No No   Sig: TAKE 1 TABLET (5 MG TOTAL) BY MOUTH DAILY.   oxygen gas  Self Yes No   Sig: Inhale 2 L/min continuous. Indications: copd   pregabalin (LYRICA) 25 mg capsule   No No   Sig: Take 1 capsule (25 mg total) by mouth 2 (two) times a day   sertraline (ZOLOFT) 100 mg tablet   No No   Sig: TAKE 1 TABLET BY MOUTH EVERY DAY   simvastatin (ZOCOR) 40 mg tablet   No No   Sig: TAKE 1 TABLET BY MOUTH EVERYDAY AT BEDTIME   warfarin (Coumadin) 2.5 mg tablet  Self No No   Sig: Take 1 tablet (2.5mg) Mon-Sat. Take 2 tablets (5mg) on Sun or as directed Do not start before 2024.   Patient taking differently: Take 2.5 mg by mouth daily      Facility-Administered Medications: None     Patient's Medications   Discharge Prescriptions    No medications on file     No discharge procedures on file.  ED SEPSIS DOCUMENTATION   Time reflects when diagnosis was documented in both MDM as applicable and the Disposition within this note       Time User Action Codes Description Comment    2025  2:49 PM Adalberto Medrano [R04.0] Epistaxis     2025  2:49 PM Adalberto Medrano [I50.9] Congestive heart failure (CHF) (HCC)     2025  2:50 PM Adalberto Medrano [R09.02] Hypoxia                   Norma Marley PA-C  01/29/25 1734

## 2025-01-29 NOTE — ASSESSMENT & PLAN NOTE
Not on any rate controlling agents  Chronically maintained on Coumadin, INR 2.28 today, continue to monitor INR daily.  Also noted history of TAVR in 2018.

## 2025-01-29 NOTE — ASSESSMENT & PLAN NOTE
Wt Readings from Last 3 Encounters:   01/23/25 78.5 kg (173 lb)   01/09/25 78.9 kg (174 lb)   12/16/24 81.2 kg (179 lb)       Appears euvolemic  Continue home Lasix  Echo 7/24 notes EF 65%, normal diastolic function  Monitor I's/O, daily weights

## 2025-01-29 NOTE — TELEPHONE ENCOUNTER
Adrienne from [facility] is calling to inform PCP that patient is currently in ED due to a non stop nose bleed from this am. She is being admitted as observation.

## 2025-01-30 LAB
ANION GAP SERPL CALCULATED.3IONS-SCNC: 8 MMOL/L (ref 4–13)
BUN SERPL-MCNC: 20 MG/DL (ref 5–25)
CALCIUM SERPL-MCNC: 9.6 MG/DL (ref 8.4–10.2)
CHLORIDE SERPL-SCNC: 102 MMOL/L (ref 96–108)
CO2 SERPL-SCNC: 28 MMOL/L (ref 21–32)
CREAT SERPL-MCNC: 0.73 MG/DL (ref 0.6–1.3)
ERYTHROCYTE [DISTWIDTH] IN BLOOD BY AUTOMATED COUNT: 19.3 % (ref 11.6–15.1)
GFR SERPL CREATININE-BSD FRML MDRD: 75 ML/MIN/1.73SQ M
GLUCOSE SERPL-MCNC: 93 MG/DL (ref 65–140)
HCT VFR BLD AUTO: 34.8 % (ref 34.8–46.1)
HGB BLD-MCNC: 10.5 G/DL (ref 11.5–15.4)
INR PPP: 2.41 (ref 0.85–1.19)
MCH RBC QN AUTO: 24.2 PG (ref 26.8–34.3)
MCHC RBC AUTO-ENTMCNC: 30.2 G/DL (ref 31.4–37.4)
MCV RBC AUTO: 80 FL (ref 82–98)
PLATELET # BLD AUTO: 369 THOUSANDS/UL (ref 149–390)
PMV BLD AUTO: 9.1 FL (ref 8.9–12.7)
POTASSIUM SERPL-SCNC: 3.9 MMOL/L (ref 3.5–5.3)
PROTHROMBIN TIME: 26.9 SECONDS (ref 12.3–15)
RBC # BLD AUTO: 4.33 MILLION/UL (ref 3.81–5.12)
SODIUM SERPL-SCNC: 138 MMOL/L (ref 135–147)
WBC # BLD AUTO: 9.19 THOUSAND/UL (ref 4.31–10.16)

## 2025-01-30 PROCEDURE — 85610 PROTHROMBIN TIME: CPT | Performed by: FAMILY MEDICINE

## 2025-01-30 PROCEDURE — 80048 BASIC METABOLIC PNL TOTAL CA: CPT | Performed by: FAMILY MEDICINE

## 2025-01-30 PROCEDURE — 85027 COMPLETE CBC AUTOMATED: CPT | Performed by: FAMILY MEDICINE

## 2025-01-30 PROCEDURE — 99232 SBSQ HOSP IP/OBS MODERATE 35: CPT | Performed by: NURSE PRACTITIONER

## 2025-01-30 RX ADMIN — WARFARIN SODIUM 2.5 MG: 2.5 TABLET ORAL at 17:23

## 2025-01-30 RX ADMIN — ACETAMINOPHEN 650 MG: 325 TABLET, FILM COATED ORAL at 13:22

## 2025-01-30 RX ADMIN — PREGABALIN 25 MG: 25 CAPSULE ORAL at 17:23

## 2025-01-30 RX ADMIN — PREGABALIN 25 MG: 25 CAPSULE ORAL at 09:54

## 2025-01-30 RX ADMIN — OXYBUTYNIN CHLORIDE 5 MG: 5 TABLET, EXTENDED RELEASE ORAL at 09:54

## 2025-01-30 RX ADMIN — FUROSEMIDE 40 MG: 40 TABLET ORAL at 09:54

## 2025-01-30 RX ADMIN — PRAVASTATIN SODIUM 80 MG: 80 TABLET ORAL at 17:23

## 2025-01-30 RX ADMIN — FERROUS SULFATE TAB 325 MG (65 MG ELEMENTAL FE) 325 MG: 325 (65 FE) TAB at 07:14

## 2025-01-30 RX ADMIN — ACETAMINOPHEN 650 MG: 325 TABLET, FILM COATED ORAL at 07:15

## 2025-01-30 RX ADMIN — ASPIRIN 81 MG: 81 TABLET, COATED ORAL at 09:54

## 2025-01-30 RX ADMIN — LEVOTHYROXINE SODIUM 50 MCG: 0.05 TABLET ORAL at 05:46

## 2025-01-30 RX ADMIN — ACETAMINOPHEN 650 MG: 325 TABLET, FILM COATED ORAL at 22:23

## 2025-01-30 RX ADMIN — SERTRALINE HYDROCHLORIDE 100 MG: 50 TABLET ORAL at 09:54

## 2025-01-30 RX ADMIN — PANTOPRAZOLE SODIUM 40 MG: 40 TABLET, DELAYED RELEASE ORAL at 07:15

## 2025-01-30 RX ADMIN — PANTOPRAZOLE SODIUM 40 MG: 40 TABLET, DELAYED RELEASE ORAL at 17:23

## 2025-01-30 NOTE — PLAN OF CARE
Problem: PAIN - ADULT  Goal: Verbalizes/displays adequate comfort level or baseline comfort level  Description: Interventions:  - Encourage patient to monitor pain and request assistance  - Assess pain using appropriate pain scale  - Administer analgesics based on type and severity of pain and evaluate response  - Implement non-pharmacological measures as appropriate and evaluate response  - Consider cultural and social influences on pain and pain management  - Notify physician/advanced practitioner if interventions unsuccessful or patient reports new pain  Outcome: Progressing     Problem: INFECTION - ADULT  Goal: Absence or prevention of progression during hospitalization  Description: INTERVENTIONS:  - Assess and monitor for signs and symptoms of infection  - Monitor lab/diagnostic results  - Monitor all insertion sites, i.e. indwelling lines, tubes, and drains  - Monitor endotracheal if appropriate and nasal secretions for changes in amount and color  - [place] appropriate cooling/warming therapies per order  - Administer medications as ordered  - Instruct and encourage patient and family to use good hand hygiene technique  - Identify and instruct in appropriate isolation precautions for identified infection/condition  Outcome: Progressing  Goal: Absence of fever/infection during neutropenic period  Description: INTERVENTIONS:  - Monitor WBC    Outcome: Progressing     Problem: SAFETY ADULT  Goal: Patient will remain free of falls  Description: INTERVENTIONS:  - Educate patient/family on patient safety including physical limitations  - Instruct patient to call for assistance with activity   - Consult OT/PT to assist with strengthening/mobility   - Keep Call bell within reach  - Keep bed low and locked with side rails adjusted as appropriate  - Keep care items and personal belongings within reach  - Initiate and maintain comfort rounds  - Make Fall Risk Sign visible to staff  - Offer Toileting every 2 Hours,  in advance of need  - Initiate/Maintain bed alarm  - Apply yellow socks and bracelet for high fall risk patients  - Consider moving patient to room near nurses station  Outcome: Progressing  Goal: Maintain or return to baseline ADL function  Description: INTERVENTIONS:  -  Assess patient's ability to carry out ADLs; assess patient's baseline for ADL function and identify physical deficits which impact ability to perform ADLs (bathing, care of mouth/teeth, toileting, grooming, dressing, etc.)  - Assess/evaluate cause of self-care deficits   - Assess range of motion  - Assess patient's mobility; develop plan if impaired  - Assess patient's need for assistive devices and provide as appropriate  - Encourage maximum independence but intervene and supervise when necessary  - Involve family in performance of ADLs  - Assess for home care needs following discharge   - Consider OT consult to assist with ADL evaluation and planning for discharge  - Provide patient education as appropriate  Outcome: Progressing  Goal: Maintains/Returns to pre admission functional level  Description: INTERVENTIONS:  - Perform AM-PAC 6 Click Basic Mobility/ Daily Activity assessment daily.  - Set and communicate daily mobility goal to care team and patient/family/caregiver.   - Collaborate with rehabilitation services on mobility goals if consulted  - Perform Range of Motion 2 times a day.  - Reposition patient every 2 hours.  - Dangle patient 2 times a day  - Stand patient 2 times a day  - Ambulate patient 2 times a day  - Out of bed to chair 2 times a day   - Out of bed for meals 2 times a day  - Out of bed for toileting  - Record patient progress and toleration of activity level   Outcome: Progressing     Problem: DISCHARGE PLANNING  Goal: Discharge to home or other facility with appropriate resources  Description: INTERVENTIONS:  - Identify barriers to discharge w/patient and caregiver  - Arrange for needed discharge resources and  transportation as appropriate  - Identify discharge learning needs (meds, wound care, etc.)  - Arrange for interpretive services to assist at discharge as needed  - Refer to Case Management Department for coordinating discharge planning if the patient needs post-hospital services based on physician/advanced practitioner order or complex needs related to functional status, cognitive ability, or social support system  Outcome: Progressing     Problem: Knowledge Deficit  Goal: Patient/family/caregiver demonstrates understanding of disease process, treatment plan, medications, and discharge instructions  Description: Complete learning assessment and assess knowledge base.  Interventions:  - Provide teaching at level of understanding  - Provide teaching via preferred learning methods  Outcome: Progressing

## 2025-01-30 NOTE — ASSESSMENT & PLAN NOTE
Wt Readings from Last 3 Encounters:   01/29/25 79.8 kg (175 lb 14.8 oz)   01/23/25 78.5 kg (173 lb)   01/09/25 78.9 kg (174 lb)     Appears euvolemic  Continue home Lasix  Echo 7/24 notes EF 65%, normal diastolic function

## 2025-01-30 NOTE — ASSESSMENT & PLAN NOTE
85 yo lady with underlying history of chronic hypoxic respiratory failure secondary to JANICE/COPD on O2/CPAP, history of A-fib on Coumadin presents with right-sided epistaxis to the ED.  S/p tranexamic acid/Merocel packing in ED with which her bleeding has currently stopped.    Patient remains hemodynamically stable with hemoglobin 10.9  ER provider did speak with ENT on-call Dr. Palacio who recommended outpatient follow-up on Friday which is 1/31/2025  Given patient is O2/CPAP dependent, plan is to admit patient to [facility] and discharge directly for ENT appointment.    Monitor for any further bleeding  Monitor CBC closely  Continue with CPAP [okay per ENT]  ENT will help arrange outpatient appointment.  Will be going to ENT office with Usha Evans in place

## 2025-01-30 NOTE — ASSESSMENT & PLAN NOTE
83 yo lady with underlying history of chronic hypoxic respiratory failure secondary to JANICE/COPD on O2/CPAP, history of A-fib on Coumadin presents with right-sided epistaxis to the ED.  S/p tranexamic acid/Merocel packing in ED with which her bleeding has currently stopped.    Patient remains hemodynamically stable with hemoglobin 10.9  ER provider did speak with ENT on-call Dr. Palacio who recommended outpatient follow-up on Friday which is 1/31/2025  Given patient is O2/CPAP dependent, plan is to admit patient to [facility] and discharge directly for ENT appointment.    Monitor for any further bleeding  Monitor CBC closely  Continue with CPAP okay per ENT  ENT will help arrange outpatient appointment.  Will be going to ENT office with Usha Evans in place  ENT appointment in [place] on 1/31 at 10:15 AM

## 2025-01-30 NOTE — PROGRESS NOTES
Progress Note - Hospitalist   Name: Marisol Otoole 84 y.o. female I MRN: [id number]  Unit/Bed#: 2 E 262-01 I Date of Admission: 1/29/2025   Date of Service: 1/30/2025 I Hospital Day: 0    Assessment & Plan  Epistaxis  85 yo lady with underlying history of chronic hypoxic respiratory failure secondary to JANICE/COPD on O2/CPAP, history of A-fib on Coumadin presents with right-sided epistaxis to the ED.  S/p tranexamic acid/Merocel packing in ED with which her bleeding has currently stopped.    Patient remains hemodynamically stable with hemoglobin 10.9  ER provider did speak with ENT on-call Dr. Palacio who recommended outpatient follow-up on Friday which is 1/31/2025  Given patient is O2/CPAP dependent, plan is to admit patient to [facility] and discharge directly for ENT appointment.    Monitor for any further bleeding  Monitor CBC closely  Continue with CPAP [okay per ENT]  ENT will help arrange outpatient appointment.  Will be going to ENT office with Usha Evans in place  Primary hypertension  Blood pressure well-controlled  Continue home Lasix.  Chronic heart failure with preserved ejection fraction (HFpEF) (HCC)  Wt Readings from Last 3 Encounters:   01/29/25 79.8 kg (175 lb 14.8 oz)   01/23/25 78.5 kg (173 lb)   01/09/25 78.9 kg (174 lb)     Appears euvolemic  Continue home Lasix  Echo 7/24 notes EF 65%, normal diastolic function  JANICE on CPAP  Okay to continue CPAP per ENT provider  Chronic hypoxemic respiratory failure (HCC)  History of JANICE/COPD, dependent on CPAP/O2 at night.  Paroxysmal atrial fibrillation (HCC)  Not on any rate controlling agents  Chronically maintained on Coumadin, INR 2.28 today, continue to monitor INR daily.  Also noted history of TAVR in 2018.      Lab Results   Component Value Date    INR 2.41 (H) 01/30/2025    INR 2.28 (H) 01/29/2025       VTE Pharmacologic Prophylaxis: VTE Score: 5 Moderate Risk (Score 3-4) - Pharmacological DVT Prophylaxis Ordered: warfarin (Coumadin).    Mobility:    Basic Mobility Inpatient Raw Score: 16  JH-HLM Goal: 5: Stand one or more mins  JH-HLM Achieved: 6: Walk 10 steps or more  JH-HLM Goal achieved. Continue to encourage appropriate mobility.    Patient Centered Rounds: I performed bedside rounds with nursing staff today.  Alethea  Discussions with Specialists or Other Care Team Provider: Spoke with the ENT    Education and Discussions with Family / Patient: Discussed plan of care with patient    Current Length of Stay: 0 day(s)  Current Patient Status: Observation   Certification Statement: The patient will continue to require additional inpatient hospital stay due to monitoring of hemoglobin  Discharge Plan: Anticipate discharge tomorrow to home.    Code Status: Level 1 - Full Code    Subjective   Denies any chest pain chest tightness shortness of breath difficulty breathing offers no acute complaints has a headache is uncomfortable with the nose rocket in place    Objective :  Temp:  [98.2 °F (36.8 °C)-98.7 °F (37.1 °C)] 98.2 °F (36.8 °C)  HR:  [60-65] 60  BP: (123-157)/(60-70) 130/61  Resp:  [17-20] 20  SpO2:  [90 %-99 %] 95 %  O2 Device: None (Room air)  Nasal Cannula O2 Flow Rate (L/min):  [2 L/min-8 L/min] 8 L/min  FiO2 (%):  [40] 40    Body mass index is 42.42 kg/m².     Input and Output Summary (last 24 hours):     Intake/Output Summary (Last 24 hours) at 1/30/2025 1334  Last data filed at 1/30/2025 0721  Gross per 24 hour   Intake --   Output 75 ml   Net -75 ml       Physical Exam  Vitals reviewed.   Constitutional:       General: She is not in acute distress.     Appearance: She is obese. She is ill-appearing.   HENT:      Nose:      Right Nostril: Foreign body and epistaxis present.   Neurological:      Mental Status: She is alert. Mental status is at baseline.   Psychiatric:         Mood and Affect: Mood normal.         Thought Content: Thought content normal.       Lines/Drains:      Lab Results: I have reviewed the following results:   Results from last  7 days   Lab Units 01/30/25  0439 01/29/25  1020   WBC Thousand/uL 9.19 10.34*   HEMOGLOBIN g/dL 10.5* 10.9*   HEMATOCRIT % 34.8 36.4   PLATELETS Thousands/uL 369 364   SEGS PCT %  --  68   LYMPHO PCT %  --  17   MONO PCT %  --  9   EOS PCT %  --  5     Results from last 7 days   Lab Units 01/30/25  0439 01/29/25  1020 01/23/25  1833   SODIUM mmol/L 138   < > 138   POTASSIUM mmol/L 3.9   < > 3.9   CHLORIDE mmol/L 102   < > 102   CO2 mmol/L 28   < > 29   BUN mg/dL 20   < > 20   CREATININE mg/dL 0.73   < > 0.72   ANION GAP mmol/L 8   < > 7   CALCIUM mg/dL 9.6   < > 9.8   ALBUMIN g/dL  --   --  3.7   TOTAL BILIRUBIN mg/dL  --   --  0.40   ALK PHOS U/L  --   --  70   ALT U/L  --   --  13   AST U/L  --   --  20   GLUCOSE RANDOM mg/dL 93   < > 83    < > = values in this interval not displayed.     Results from last 7 days   Lab Units 01/30/25  0439   INR  2.41*                   Recent Cultures (last 7 days):         Imaging Results Review: No pertinent imaging studies reviewed.  Other Study Results Review: No additional pertinent studies reviewed.    Last 24 Hours Medication List:     Current Facility-Administered Medications:     acetaminophen (TYLENOL) tablet 650 mg, Q6H PRN    aspirin (ECOTRIN LOW STRENGTH) EC tablet 81 mg, Daily    ferrous sulfate tablet 325 mg, Daily With Breakfast    furosemide (LASIX) tablet 40 mg, Daily    levothyroxine tablet 50 mcg, Daily    oxybutynin (DITROPAN-XL) 24 hr tablet 5 mg, Daily    pantoprazole (PROTONIX) EC tablet 40 mg, BID AC    pravastatin (PRAVACHOL) tablet 80 mg, Daily With Dinner    pregabalin (LYRICA) capsule 25 mg, BID    sertraline (ZOLOFT) tablet 100 mg, Daily    warfarin (COUMADIN) tablet 2.5 mg, Daily (warfarin)    Administrative Statements   Today, Patient Was Seen By: MABEL Aguirre  I have spent a total time of 45 minutes in caring for this patient on the day of the visit/encounter including Diagnostic results, Prognosis, Patient and family education,  Importance of tx compliance, Counseling / Coordination of care, Documenting in the medical record, and Obtaining or reviewing history  .    **Please Note: This note may have been constructed using a voice recognition system.**

## 2025-01-30 NOTE — MALNUTRITION/BMI
This medical record reflects one or more clinical indicators suggestive of Class 3 Obesity      BMI Findings:  Class 3 Obesity        Body mass index is 42.42 kg/m².     See Nutrition note dated 1/30/25 for additional details.  Completed nutrition assessment is viewable in the nutrition documentation.

## 2025-01-30 NOTE — ASSESSMENT & PLAN NOTE
Not on any rate controlling agents  Chronically maintained on Coumadin, INR 2.28 today, continue to monitor INR daily.  Also noted history of TAVR in 2018.      Lab Results   Component Value Date    INR 2.41 (H) 01/30/2025    INR 2.28 (H) 01/29/2025

## 2025-01-30 NOTE — PLAN OF CARE
Problem: PAIN - ADULT  Goal: Verbalizes/displays adequate comfort level or baseline comfort level  Description: Interventions:  - Encourage patient to monitor pain and request assistance  - Assess pain using appropriate pain scale  - Administer analgesics based on type and severity of pain and evaluate response  - Implement non-pharmacological measures as appropriate and evaluate response  - Consider cultural and social influences on pain and pain management  - Notify physician/advanced practitioner if interventions unsuccessful or patient reports new pain  Outcome: Progressing     Problem: INFECTION - ADULT  Goal: Absence or prevention of progression during hospitalization  Description: INTERVENTIONS:  - Assess and monitor for signs and symptoms of infection  - Monitor lab/diagnostic results  - Monitor all insertion sites, i.e. indwelling lines, tubes, and drains  - Monitor endotracheal if appropriate and nasal secretions for changes in amount and color  - [place] appropriate cooling/warming therapies per order  - Administer medications as ordered  - Instruct and encourage patient and family to use good hand hygiene technique  - Identify and instruct in appropriate isolation precautions for identified infection/condition  Outcome: Progressing  Goal: Absence of fever/infection during neutropenic period  Description: INTERVENTIONS:  - Monitor WBC    Outcome: Progressing     Problem: SAFETY ADULT  Goal: Patient will remain free of falls  Description: INTERVENTIONS:  - Educate patient/family on patient safety including physical limitations  - Instruct patient to call for assistance with activity   - Consult OT/PT to assist with strengthening/mobility   - Keep Call bell within reach  - Keep bed low and locked with side rails adjusted as appropriate  - Keep care items and personal belongings within reach  - Initiate and maintain comfort rounds  - Make Fall Risk Sign visible to staff  - Offer Toileting every 2 Hours,  in advance of need  - Initiate/Maintain bed/chair alarm  - Obtain necessary fall risk management equipment  - Apply yellow socks and bracelet for high fall risk patients  - Consider moving patient to room near nurses station  Outcome: Progressing  Goal: Maintain or return to baseline ADL function  Description: INTERVENTIONS:  -  Assess patient's ability to carry out ADLs; assess patient's baseline for ADL function and identify physical deficits which impact ability to perform ADLs (bathing, care of mouth/teeth, toileting, grooming, dressing, etc.)  - Assess/evaluate cause of self-care deficits   - Assess range of motion  - Assess patient's mobility; develop plan if impaired  - Assess patient's need for assistive devices and provide as appropriate  - Encourage maximum independence but intervene and supervise when necessary  - Involve family in performance of ADLs  - Assess for home care needs following discharge   - Consider OT consult to assist with ADL evaluation and planning for discharge  - Provide patient education as appropriate  Outcome: Progressing  Goal: Maintains/Returns to pre admission functional level  Description: INTERVENTIONS:  - Perform AM-PAC 6 Click Basic Mobility/ Daily Activity assessment daily.  - Set and communicate daily mobility goal to care team and patient/family/caregiver.   - Collaborate with rehabilitation services on mobility goals if consulted  - Perform Range of Motion 3 times a day.  - Reposition patient every 3 hours.  - Dangle patient 3 times a day  - Stand patient 3 times a day  - Ambulate patient 3 times a day  - Out of bed to chair 3 times a day   - Out of bed for meals 3 times a day  - Out of bed for toileting  - Record patient progress and toleration of activity level   Outcome: Progressing     Problem: DISCHARGE PLANNING  Goal: Discharge to home or other facility with appropriate resources  Description: INTERVENTIONS:  - Identify barriers to discharge w/patient and  caregiver  - Arrange for needed discharge resources and transportation as appropriate  - Identify discharge learning needs (meds, wound care, etc.)  - Arrange for interpretive services to assist at discharge as needed  - Refer to Case Management Department for coordinating discharge planning if the patient needs post-hospital services based on physician/advanced practitioner order or complex needs related to functional status, cognitive ability, or social support system  Outcome: Progressing     Problem: Knowledge Deficit  Goal: Patient/family/caregiver demonstrates understanding of disease process, treatment plan, medications, and discharge instructions  Description: Complete learning assessment and assess knowledge base.  Interventions:  - Provide teaching at level of understanding  - Provide teaching via preferred learning methods  Outcome: Progressing     Problem: Prexisting or High Potential for Compromised Skin Integrity  Goal: Skin integrity is maintained or improved  Description: INTERVENTIONS:  - Identify patients at risk for skin breakdown  - Assess and monitor skin integrity  - Assess and monitor nutrition and hydration status  - Monitor labs   - Assess for incontinence   - Turn and reposition patient  - Assist with mobility/ambulation  - Relieve pressure over bony prominences  - Avoid friction and shearing  - Provide appropriate hygiene as needed including keeping skin clean and dry  - Evaluate need for skin moisturizer/barrier cream  - Collaborate with interdisciplinary team   - Patient/family teaching  - Consider wound care consult   Outcome: Progressing

## 2025-01-31 ENCOUNTER — TRANSITIONAL CARE MANAGEMENT (OUTPATIENT)
Dept: FAMILY MEDICINE CLINIC | Facility: CLINIC | Age: 85
End: 2025-01-31

## 2025-01-31 ENCOUNTER — TELEPHONE (OUTPATIENT)
Age: 85
End: 2025-01-31

## 2025-01-31 VITALS
HEART RATE: 64 BPM | DIASTOLIC BLOOD PRESSURE: 92 MMHG | RESPIRATION RATE: 19 BRPM | SYSTOLIC BLOOD PRESSURE: 122 MMHG | TEMPERATURE: 98.3 F | HEIGHT: 55 IN | WEIGHT: 175.93 LBS | BODY MASS INDEX: 40.71 KG/M2 | OXYGEN SATURATION: 97 %

## 2025-01-31 DIAGNOSIS — I27.20 PULMONARY HYPERTENSION (HCC): Chronic | ICD-10-CM

## 2025-01-31 DIAGNOSIS — Z71.89 COMPLEX CARE COORDINATION: Primary | ICD-10-CM

## 2025-01-31 DIAGNOSIS — I50.32 CHRONIC HEART FAILURE WITH PRESERVED EJECTION FRACTION (HFPEF) (HCC): ICD-10-CM

## 2025-01-31 DIAGNOSIS — I10 PRIMARY HYPERTENSION: ICD-10-CM

## 2025-01-31 DIAGNOSIS — I51.7 LVH (LEFT VENTRICULAR HYPERTROPHY): Primary | Chronic | ICD-10-CM

## 2025-01-31 PROCEDURE — 99239 HOSP IP/OBS DSCHRG MGMT >30: CPT | Performed by: NURSE PRACTITIONER

## 2025-01-31 RX ADMIN — FERROUS SULFATE TAB 325 MG (65 MG ELEMENTAL FE) 325 MG: 325 (65 FE) TAB at 08:43

## 2025-01-31 RX ADMIN — OXYBUTYNIN CHLORIDE 5 MG: 5 TABLET, EXTENDED RELEASE ORAL at 08:43

## 2025-01-31 RX ADMIN — LEVOTHYROXINE SODIUM 50 MCG: 0.05 TABLET ORAL at 05:02

## 2025-01-31 RX ADMIN — PANTOPRAZOLE SODIUM 40 MG: 40 TABLET, DELAYED RELEASE ORAL at 08:44

## 2025-01-31 RX ADMIN — SERTRALINE HYDROCHLORIDE 100 MG: 50 TABLET ORAL at 08:43

## 2025-01-31 RX ADMIN — FUROSEMIDE 40 MG: 40 TABLET ORAL at 08:49

## 2025-01-31 RX ADMIN — PREGABALIN 25 MG: 25 CAPSULE ORAL at 08:43

## 2025-01-31 RX ADMIN — ASPIRIN 81 MG: 81 TABLET, COATED ORAL at 08:48

## 2025-01-31 NOTE — PLAN OF CARE
Problem: PAIN - ADULT  Goal: Verbalizes/displays adequate comfort level or baseline comfort level  Description: Interventions:  - Encourage patient to monitor pain and request assistance  - Assess pain using appropriate pain scale  - Administer analgesics based on type and severity of pain and evaluate response  - Implement non-pharmacological measures as appropriate and evaluate response  - Consider cultural and social influences on pain and pain management  - Notify physician/advanced practitioner if interventions unsuccessful or patient reports new pain  Outcome: Progressing     Problem: INFECTION - ADULT  Goal: Absence or prevention of progression during hospitalization  Description: INTERVENTIONS:  - Assess and monitor for signs and symptoms of infection  - Monitor lab/diagnostic results  - Monitor all insertion sites, i.e. indwelling lines, tubes, and drains  - Monitor endotracheal if appropriate and nasal secretions for changes in amount and color  - [place] appropriate cooling/warming therapies per order  - Administer medications as ordered  - Instruct and encourage patient and family to use good hand hygiene technique  - Identify and instruct in appropriate isolation precautions for identified infection/condition  Outcome: Progressing  Goal: Absence of fever/infection during neutropenic period  Description: INTERVENTIONS:  - Monitor WBC    Outcome: Progressing     Problem: SAFETY ADULT  Goal: Patient will remain free of falls  Description: INTERVENTIONS:  - Educate patient/family on patient safety including physical limitations  - Instruct patient to call for assistance with activity   - Consult OT/PT to assist with strengthening/mobility   - Keep Call bell within reach  - Keep bed low and locked with side rails adjusted as appropriate  - Keep care items and personal belongings within reach  - Initiate and maintain comfort rounds  - Make Fall Risk Sign visible to staff  - Offer Toileting every 2 Hours,  in advance of need  - Initiate/Maintain bed alarm  - Obtain necessary fall risk management equipment  - Apply yellow socks and bracelet for high fall risk patients  - Consider moving patient to room near nurses station  Outcome: Progressing  Goal: Maintain or return to baseline ADL function  Description: INTERVENTIONS:  -  Assess patient's ability to carry out ADLs; assess patient's baseline for ADL function and identify physical deficits which impact ability to perform ADLs (bathing, care of mouth/teeth, toileting, grooming, dressing, etc.)  - Assess/evaluate cause of self-care deficits   - Assess range of motion  - Assess patient's mobility; develop plan if impaired  - Assess patient's need for assistive devices and provide as appropriate  - Encourage maximum independence but intervene and supervise when necessary  - Involve family in performance of ADLs  - Assess for home care needs following discharge   - Consider OT consult to assist with ADL evaluation and planning for discharge  - Provide patient education as appropriate  Outcome: Progressing  Goal: Maintains/Returns to pre admission functional level  Description: INTERVENTIONS:  - Perform AM-PAC 6 Click Basic Mobility/ Daily Activity assessment daily.  - Set and communicate daily mobility goal to care team and patient/family/caregiver.   - Collaborate with rehabilitation services on mobility goals if consulted  - Perform Range of Motion 3 times a day.  - Reposition patient every 2 hours.  - Dangle patient 3 times a day  - Stand patient 3 times a day  - Ambulate patient 3 times a day  - Out of bed to chair 3 times a day   - Out of bed for meals 3 times a day  - Out of bed for toileting  - Record patient progress and toleration of activity level   Outcome: Progressing     Problem: DISCHARGE PLANNING  Goal: Discharge to home or other facility with appropriate resources  Description: INTERVENTIONS:  - Identify barriers to discharge w/patient and caregiver  -  Arrange for needed discharge resources and transportation as appropriate  - Identify discharge learning needs (meds, wound care, etc.)  - Arrange for interpretive services to assist at discharge as needed  - Refer to Case Management Department for coordinating discharge planning if the patient needs post-hospital services based on physician/advanced practitioner order or complex needs related to functional status, cognitive ability, or social support system  Outcome: Progressing     Problem: Knowledge Deficit  Goal: Patient/family/caregiver demonstrates understanding of disease process, treatment plan, medications, and discharge instructions  Description: Complete learning assessment and assess knowledge base.  Interventions:  - Provide teaching at level of understanding  - Provide teaching via preferred learning methods  Outcome: Progressing     Problem: Prexisting or High Potential for Compromised Skin Integrity  Goal: Skin integrity is maintained or improved  Description: INTERVENTIONS:  - Identify patients at risk for skin breakdown  - Assess and monitor skin integrity  - Assess and monitor nutrition and hydration status  - Monitor labs   - Assess for incontinence   - Turn and reposition patient  - Assist with mobility/ambulation  - Relieve pressure over bony prominences  - Avoid friction and shearing  - Provide appropriate hygiene as needed including keeping skin clean and dry  - Evaluate need for skin moisturizer/barrier cream  - Collaborate with interdisciplinary team   - Patient/family teaching  - Consider wound care consult   Outcome: Progressing

## 2025-01-31 NOTE — PROGRESS NOTES
Patient:  MAURISIO ELENA    MRN:  [id number]    Aidin Request ID:  [id number]    Level of care reserved:  Home Health Agency    Partner Reserved:  [facility], [place] [address] [phone]    Clinical needs requested:    Geography searched:  [id number]    Start of Service:    Request sent:  9:06am EST on 1/31/2025 by Linda Sifuentes    Partner reserved:  9:25am EST on 1/31/2025 by Linda Sifuentes    Choice list shared:  9:24am EST on 1/31/2025 by Linda Sifuentes

## 2025-01-31 NOTE — DISCHARGE SUMMARY
Discharge Summary - Hospitalist   Name: Marisol Otoole 84 y.o. female I MRN: [id number]  Unit/Bed#: 2 E 262-01 I Date of Admission: 1/29/2025   Date of Service: 1/31/2025 I Hospital Day: 1     Assessment & Plan  Epistaxis  83 yo lady with underlying history of chronic hypoxic respiratory failure secondary to JANICE/COPD on O2/CPAP, history of A-fib on Coumadin presents with right-sided epistaxis to the ED.  S/p tranexamic acid/Merocel packing in ED with which her bleeding has currently stopped.    Patient remains hemodynamically stable with hemoglobin 10.9  ER provider did speak with ENT on-call Dr. Palacio who recommended outpatient follow-up on Friday which is 1/31/2025  Given patient is O2/CPAP dependent, plan is to admit patient to [facility] and discharge directly for ENT appointment.    Monitor for any further bleeding  Monitor CBC closely  Continue with CPAP okay per ENT  ENT will help arrange outpatient appointment.  Will be going to ENT office with Usha Evans in place  ENT appointment in [place] on 1/31 at 10:15 AM  JANICE on CPAP  Okay to continue CPAP per ENT provider  Chronic hypoxemic respiratory failure (HCC)  History of JANICE/COPD, dependent on CPAP/O2 at night.  Paroxysmal atrial fibrillation (HCC)  Not on any rate controlling agents  Chronically maintained on Coumadin, INR 2.28 today, continue to monitor INR daily.  Also noted history of TAVR in 2018.      Lab Results   Component Value Date    INR 2.41 (H) 01/30/2025    INR 2.28 (H) 01/29/2025       Discharging Physician / Practitioner: MABEL Aguirre  PCP: MABEL Hallman  Admission Date:   Admission Orders (From admission, onward)       Ordered        01/30/25 1449  INPATIENT ADMISSION  Once            01/29/25 1450  Place in Observation  Once                          Discharge Date: 01/31/25    Medical Problems       Resolved Problems  Date Reviewed: 12/16/2024   None         Consultations During Hospital Stay:  None    Procedures Performed:   AKHIL  chest 2 views  Result Date: 1/29/2025  No focal consolidation, pleural effusion, or pneumothorax. Resident: Ze Colunga I, the attending radiologist, have reviewed the images and agree with the final report above. Workstation performed: [id number]         Significant Findings / Test Results:   above    Incidental Findings:   no     Test Results Pending at Discharge (will require follow up):   no     Outpatient Tests Requested:  no    Complications:  no    Past Medical History:   Diagnosis Date    Anemia 08/22/2018    Anxiety     Arthritis     AVB (atrioventricular block)     first degree    Cataract     CHF (congestive heart failure) (HCC)     COPD, mild (HCC)     Coronary artery disease     Dislocation of right shoulder joint     Frequent UTI     GERD (gastroesophageal reflux disease)     H/O: pneumonia     Heme positive stool     Hyperlipidemia     Hypertension     Hypothyroidism     Morbid obesity with BMI of 50.0-59.9, adult (HCC)     Obesity, morbid (HCC) 08/22/2018    JANICE on CPAP     Pulmonary hypertension (HCC) 08/22/2018    Severe aortic stenosis     Simple goiter     Skin cyst     within the armpits, right    Wears glasses        Reason for Admission: Nose Bleed (Right nostril bleed that began overnight. +thinners)       Hospital Course:     Marisol Otoole is a 84 y.o. female patient with past medical history of  has a past medical history of Anemia, Anxiety, Arthritis, AVB (atrioventricular block), Cataract, CHF (congestive heart failure) (HCC), COPD, mild (HCC), Coronary artery disease, Dislocation of right shoulder joint, Frequent UTI, GERD (gastroesophageal reflux disease), H/O: pneumonia, Heme positive stool, Hyperlipidemia, Hypertension, Hypothyroidism, Morbid obesity with BMI of 50.0-59.9, adult (HCC), Obesity, morbid (HCC), JANICE on CPAP, Pulmonary hypertension (HCC), Severe aortic stenosis, Simple goiter, Skin cyst, and Wears glasses. who originally presented to the hospital on 1/29/2025 due to Nose  "Bleed (Right nostril bleed that began overnight. +thinners).  Usha Evans's in the emergency room been a place for 48 hours she is getting a follow-up with the ENT office in [place] with Dr. Palacio to have it removed    Please see above list of diagnoses and related plan for additional information.     Condition at Discharge: stable     Discharge Day Visit / Exam:     Subjective: Patient is doing well still having some bleeding going to ENT office  Vitals: Blood Pressure: 122/92 (01/31/25 0741)  Pulse: 64 (01/31/25 0741)  Temperature: 98.3 °F (36.8 °C) (01/31/25 0741)  Temp Source: Oral (01/31/25 0741)  Respirations: 19 (01/31/25 0741)  Height: 4' 6\" (137.2 cm) (01/29/25 2353)  Weight - Scale: 79.8 kg (175 lb 14.8 oz) (01/29/25 2353)  SpO2: 97 % (01/31/25 0741)  Exam:   Physical Exam  Vitals reviewed.   Constitutional:       General: She is not in acute distress.  Cardiovascular:      Rate and Rhythm: Normal rate.   Pulmonary:      Effort: No respiratory distress.   Skin:     General: Skin is warm.      Coloration: Skin is pale.   Neurological:      Mental Status: She is alert. Mental status is at baseline.   Psychiatric:         Mood and Affect: Mood normal.       Discussion with Family: Spoke with friend    Discharge instructions/Information to patient and family:   See after visit summary for information provided to patient and family.      Provisions for Follow-Up Care:  See after visit summary for information related to follow-up care and any pertinent home health orders.      Disposition:     Home    Planned Readmission: no     Discharge Statement:  I spent 45 minutes discharging the patient. This time was spent on the day of discharge. I had direct contact with the patient on the day of discharge. Greater than 50% of the total time was spent examining patient, answering all patient questions, arranging and discussing plan of care with patient as well as directly providing post-discharge instructions.  " Additional time then spent on discharge activities.    Discharge Medications:  See after visit summary for reconciled discharge medications provided to patient and family.      ** Please Note: This note has been constructed using a voice recognition system **

## 2025-01-31 NOTE — CASE MANAGEMENT
Case Management Assessment & Discharge Planning Note    Patient name Marisol Otoole  Location 2 [facility] 262/2 E 262-01 MRN [id number]  : 1940 Date 2025       Current Admission Date: 2025  Current Admission Diagnosis:Epistaxis   Patient Active Problem List    Diagnosis Date Noted Date Diagnosed    Epistaxis 2025     SSS (sick sinus syndrome) ([facility]) 2024     Cardiac pacemaker 2024     Non-rheumatic aortic stenosis 2024     Nonrheumatic tricuspid valve regurgitation 2024     Presence of permanent cardiac pacemaker 2024     Paroxysmal atrial fibrillation (HCC) 2024     Junctional bradycardia 2024     Anticoagulant long-term use 2024     Urinary frequency 2024     At risk for injury related to fall 2024     Walker as ambulation aid 2024     Ambulatory dysfunction 10/24/2023     Fall 2023     Leucocytosis 2023     Orbital mass 2023     Radiculopathy, lumbar region 07/10/2021     Chronic hypoxemic respiratory failure (HCC) 06/10/2021     Depression, recurrent ([facility]) 2021     Osteopenia 10/22/2020     Insomnia 2020     History of transcatheter aortic valve replacement (TAVR) 10/09/2018     Primary osteoarthritis of left shoulder      AVB (atrioventricular block)      Chronic heart failure with preserved ejection fraction (HFpEF) ([facility])      COPD, mild ([facility])      Coronary artery disease involving native coronary artery of native heart without angina pectoris      JANICE on CPAP      Gastroesophageal reflux disease without esophagitis 2018     Iron deficiency anemia secondary to inadequate dietary iron intake 2018     Morbid obesity due to excess calories ([facility]) 2018     JANICE (obstructive sleep apnea) 2018     Hyperlipidemia 2017     Primary hypertension 2017     GERD (gastroesophageal reflux disease) 10/29/2017     Acquired hypothyroidism 10/29/2017     LVH (left ventricular  hypertrophy) 09/22/2016     Mitral annular calcification 09/22/2016     Mitral valve stenosis 09/22/2016     Pulmonary hypertension (HCC) 09/22/2016       LOS (days): 1  Geometric Mean LOS (GMLOS) (days): 2.2  Days to GMLOS:1.4     OBJECTIVE:    Risk of Unplanned Readmission Score: 22.5         Current admission status: Inpatient       Preferred Pharmacy:   [id number]  [place] [facility] [address] [address]  Phone: [phone] Fax: [phone]    Primary Care Provider: MABEL Hallman    Primary Insurance: MEDICARE  Secondary Insurance:     ASSESSMENT:  Active Health Care Proxies       Yuridia Hurtado Health Care Agent - Friend   Primary Phone: [phone] (Mobile)                 Advance Directives  Does patient have a Health Care POA?: No  Was patient offered paperwork?: Yes (declined)  Does patient currently have a Health Care decision maker?: Yes, please see Health Care Proxy section  Does patient have Advance Directives?: No  Was patient offered paperwork?: Yes (declined)  Primary Contact: Angeleschristy Rushing (Friend)   [phone] (M)    Readmission Root Cause  30 Day Readmission: No    Patient Information  Admitted from:: Home  Mental Status: Alert  During Assessment patient was accompanied by: Not accompanied during assessment  Assessment information provided by:: Patient  Primary Caregiver: Self  Support Systems: Self, Spouse/significant other, [employer]  County of Residence: [place]  What city do you live in?: [place]  Home entry access options. Select all that apply.: No steps to enter home  Type of Current Residence: 2 story home  Upon entering residence, is there a bedroom on the main floor (no further steps)?: Yes  Upon entering residence, is there a bathroom on the main floor (no further steps)?: Yes  Living Arrangements: Lives w/ Friend  Is patient a ?: No    Activities of Daily Living Prior to Admission  Functional Status:  Independent  Completes ADLs independently?: Yes  Ambulates independently?: Yes  Does patient use assisted devices?: Yes  Assisted Devices (DME) used: Straight Cane, Walker, Home Oxygen concentrator  Does patient currently own DME?: Yes  What DME does the patient currently own?: Walker, Straight Cane  Does patient have a history of HHC?: Yes  Does patient currently have HHC?: Yes    Current Home Health Care  Type of Current Home Care Services: Home PT, Home OT  [facility]  Current Home Health Follow-Up Provider:: PCP (MBAEL Hallman)    Patient Information Continued  Income Source: SSI/SSD  Does patient receive dialysis treatments?: No  Does patient have a history of substance abuse?: No  Does patient have a history of Mental Health Diagnosis?: Yes (Depression)  Is patient receiving treatment for mental health?:  (Not appropriate at this time.)  Has patient received inpatient treatment related to mental health in the last 2 years?: No    Means of Transportation  Means of Transport to Appts:: Family transport          DISCHARGE DETAILS:    Discharge planning discussed with:: Patient at bedside.  Freedom of Choice: Yes  Comments - Freedom of Choice: FOC maintained - CM introduced self and role.  Patient for d/c home today.  No issues or concerns.  Would like to resume home therapy with [facility].  Friend, Yuridia, is picking her up for ENT appt this morning.  CM contacted family/caregiver?: No- see comments  Were Treatment Team discharge recommendations reviewed with patient/caregiver?: Yes  Did patient/caregiver verbalize understanding of patient care needs?: Yes  Were patient/caregiver advised of the risks associated with not following Treatment Team discharge recommendations?: Yes    Requested Home Health Care         Is the patient interested in HHC at discharge?: Yes  Home Health Discipline requested:: Occupational Therapy, Physical Therapy  [facility]  External Referral Reason (only applicable if external HHA name selected): Patient has established relationship with provider  Home Health Follow-Up Provider:: PCP ([place])  Home Health Services Needed:: Evaluate Functional Status and Safety, Gait/ADL Training, Strengthening/Theraputic Exercises to Improve Function  Homebound Criteria Met:: Uses an Assist Device (i.e. cane, walker, etc)  Supporting Clincal Findings:: Limited Endurance    DME Referral Provided  Referral made for DME?: No    Other Referral/Resources/Interventions Provided:  Interventions: HHC, Outpatient   Referral Comments: CHARMAINE referral sent to [facility] in [place].  AVS updated.  OP CM referral via In Basket d/t CHF/COPD.  Programs:: COPD, CHF    Treatment Team Recommendation: Home  Discharge Destination Plan:: Home with Home Health Care  Transport at Discharge : Other (Comment) (friend)    IMM Given (Date):: 01/31/25  IMM Given to:: Patient (Verbal review with patient at bedside.  Understanding confirmed, no questions - patient in agreement with plan for d/c today.  Original to medical records.)

## 2025-01-31 NOTE — TELEPHONE ENCOUNTER
Adrienne, Physical Therapist, [facility], reports she contacted Marisol for resumption of home health PT, following Marisol's recent hospital discharge.    Marisol would like to defer home PT. Instead, she would like home health nurse, as she is still weak and tired.    Adrienne asks that home health referral is changed to resumption of care for skilled nursing.    Fax:[phone]

## 2025-02-03 ENCOUNTER — PATIENT OUTREACH (OUTPATIENT)
Dept: CASE MANAGEMENT | Facility: OTHER | Age: 85
End: 2025-02-03

## 2025-02-03 ENCOUNTER — TELEPHONE (OUTPATIENT)
Age: 85
End: 2025-02-03

## 2025-02-03 ENCOUNTER — HOSPITAL ENCOUNTER (OUTPATIENT)
Dept: INFUSION CENTER | Facility: CLINIC | Age: 85
Discharge: HOME | End: 2025-02-03
Payer: MEDICARE

## 2025-02-03 VITALS
DIASTOLIC BLOOD PRESSURE: 64 MMHG | SYSTOLIC BLOOD PRESSURE: 136 MMHG | RESPIRATION RATE: 18 BRPM | TEMPERATURE: 98.7 F | HEART RATE: 60 BPM

## 2025-02-03 DIAGNOSIS — D50.8 IRON DEFICIENCY ANEMIA SECONDARY TO INADEQUATE DIETARY IRON INTAKE: Primary | ICD-10-CM

## 2025-02-03 PROCEDURE — 96365 THER/PROPH/DIAG IV INF INIT: CPT

## 2025-02-03 RX ORDER — SODIUM CHLORIDE 9 MG/ML
20 INJECTION, SOLUTION INTRAVENOUS ONCE
Status: COMPLETED | OUTPATIENT
Start: 2025-02-03 | End: 2025-02-03

## 2025-02-03 RX ORDER — SODIUM CHLORIDE 9 MG/ML
20 INJECTION, SOLUTION INTRAVENOUS ONCE
Status: CANCELLED | OUTPATIENT
Start: 2025-02-10

## 2025-02-03 RX ADMIN — SODIUM CHLORIDE 20 ML/HR: 9 INJECTION, SOLUTION INTRAVENOUS at 13:51

## 2025-02-03 RX ADMIN — IRON SUCROSE 200 MG: 20 INJECTION, SOLUTION INTRAVENOUS at 13:52

## 2025-02-03 NOTE — TELEPHONE ENCOUNTER
Patient would like to know if she should continue to take her iron supplement since she is now getting iron infusions.
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
## 2025-02-03 NOTE — PROGRESS NOTES
Pt to clinic for venofer, offers no complaints today, tolerated infusion without complications, aware of next appointment on 2/10/25 at 10am, PIV removed, avs declined.

## 2025-02-04 NOTE — PROGRESS NOTES
Called patient and she is unable to come in sooner as she relays on a ride to come to her apts and the person is unable to bring her in sooner

## 2025-02-06 DIAGNOSIS — Z79.01 LONG TERM (CURRENT) USE OF ANTICOAGULANTS: ICD-10-CM

## 2025-02-06 NOTE — TELEPHONE ENCOUNTER
Reason for call:   [x] Refill   [] Prior Auth  [] Other:     Office:   [] PCP/Provider -   [] Specialty/Provider -     Medication: warfarin (Coumadin) 2.5 mg tablet     Dose/Frequency: Take 1 tablet (2.5mg) Mon-Sat. Take 2 tablets (5mg) on Sun or as directed     Quantity: 102    [facility]  [place] [address]    Does the patient have enough for 3 days?   [x] Yes   [] No - Send as HP to POD

## 2025-02-07 RX ORDER — WARFARIN SODIUM 2.5 MG/1
TABLET ORAL
Qty: 102 TABLET | Refills: 0 | Status: SHIPPED | OUTPATIENT
Start: 2025-02-07

## 2025-02-08 DIAGNOSIS — R73.03 PREDIABETES: ICD-10-CM

## 2025-02-10 ENCOUNTER — HOSPITAL ENCOUNTER (OUTPATIENT)
Dept: INFUSION CENTER | Facility: CLINIC | Age: 85
Discharge: HOME | End: 2025-02-10
Payer: MEDICARE

## 2025-02-10 VITALS
SYSTOLIC BLOOD PRESSURE: 153 MMHG | TEMPERATURE: 97.5 F | OXYGEN SATURATION: 90 % | HEART RATE: 60 BPM | RESPIRATION RATE: 18 BRPM | DIASTOLIC BLOOD PRESSURE: 67 MMHG

## 2025-02-10 DIAGNOSIS — M51.369 DDD (DEGENERATIVE DISC DISEASE), LUMBAR: ICD-10-CM

## 2025-02-10 DIAGNOSIS — D50.8 IRON DEFICIENCY ANEMIA SECONDARY TO INADEQUATE DIETARY IRON INTAKE: Primary | ICD-10-CM

## 2025-02-10 DIAGNOSIS — G62.9 NEUROPATHY: ICD-10-CM

## 2025-02-10 PROCEDURE — 96365 THER/PROPH/DIAG IV INF INIT: CPT

## 2025-02-10 RX ORDER — SODIUM CHLORIDE 9 MG/ML
20 INJECTION, SOLUTION INTRAVENOUS ONCE
Status: COMPLETED | OUTPATIENT
Start: 2025-02-10 | End: 2025-02-10

## 2025-02-10 RX ORDER — SODIUM CHLORIDE 9 MG/ML
20 INJECTION, SOLUTION INTRAVENOUS ONCE
Status: CANCELLED | OUTPATIENT
Start: 2025-02-17

## 2025-02-10 RX ORDER — BLOOD SUGAR DIAGNOSTIC
STRIP MISCELLANEOUS
Qty: 100 STRIP | Refills: 4 | Status: SHIPPED | OUTPATIENT
Start: 2025-02-10

## 2025-02-10 RX ORDER — PREGABALIN 25 MG/1
25 CAPSULE ORAL 2 TIMES DAILY
Qty: 60 CAPSULE | Refills: 0 | Status: SHIPPED | OUTPATIENT
Start: 2025-02-10

## 2025-02-10 RX ADMIN — SODIUM CHLORIDE 20 ML/HR: 9 INJECTION, SOLUTION INTRAVENOUS at 10:15

## 2025-02-10 RX ADMIN — IRON SUCROSE 200 MG: 20 INJECTION, SOLUTION INTRAVENOUS at 10:15

## 2025-02-10 NOTE — TELEPHONE ENCOUNTER
Lm for pt to cb  Please schedule f/u ov with MG when pt calls back    Can likely send short supply to get to ov once appt is made

## 2025-02-10 NOTE — TELEPHONE ENCOUNTER
Caller: Patient    Doctor: Dr. Montero    Reason for call: Patient is scheduled for o/v for 3/5/25 with Tati Long. Patient asked if her medication will now be refilled.    Please advise    Call back#: [phone]     Patient comes in today for routine prenatal visit. No complaints/concerns today.     Fetal Movement: Yes  Contractions: No  Vaginal Bleeding: No  Leaking Fluid: No  GI/: No    Vitals:    04/12/24 1123   BP: 120/70   Site: Right Upper Arm   Position: Sitting   Weight: 84.8 kg (187 lb)   Height: 1.575 m (5' 2\")      Chaperone for Intimate Exam     Chaperone was offer accepted as part of the rooming process    Chaperone: Bina De Anda

## 2025-02-10 NOTE — PROGRESS NOTES
Pt into clinic for Venofer. Pt offers no complaints.     Tolerated infusion without reaction. PIV removed.     Pt aware of next appointment on 2/17/25 at 10:30am. AVS declined.

## 2025-02-10 NOTE — TELEPHONE ENCOUNTER
Reason for call:   [x] Refill   [] Prior Auth  [] Other:     Office:   [] PCP/Provider -   [x] Specialty/Provider - [facility]     Medication:  pregabalin (LYRICA) 25 mg capsule    Dose/Frequency: Take 1 capsule (25 mg total) by mouth 2 (two) times a day     Quantity: 60    Pharmacy: [id number] - [place] - [address] [phone]        Does the patient have enough for 3 days?   [] Yes   [x] No - Send as HP to POD

## 2025-02-13 ENCOUNTER — TELEPHONE (OUTPATIENT)
Age: 85
End: 2025-02-13

## 2025-02-13 NOTE — TELEPHONE ENCOUNTER
Abigail from  calling that she did the PT evaluation and will be faxing orders to be signed to continue patient PT.

## 2025-02-16 DIAGNOSIS — I50.31 ACUTE DIASTOLIC CONGESTIVE HEART FAILURE (HCC): ICD-10-CM

## 2025-02-17 ENCOUNTER — HOSPITAL ENCOUNTER (OUTPATIENT)
Dept: INFUSION CENTER | Facility: CLINIC | Age: 85
Discharge: HOME | End: 2025-02-17
Payer: MEDICARE

## 2025-02-17 VITALS
SYSTOLIC BLOOD PRESSURE: 148 MMHG | HEART RATE: 60 BPM | TEMPERATURE: 97.6 F | DIASTOLIC BLOOD PRESSURE: 66 MMHG | RESPIRATION RATE: 18 BRPM

## 2025-02-17 DIAGNOSIS — D50.8 IRON DEFICIENCY ANEMIA SECONDARY TO INADEQUATE DIETARY IRON INTAKE: Primary | ICD-10-CM

## 2025-02-17 PROCEDURE — 96365 THER/PROPH/DIAG IV INF INIT: CPT

## 2025-02-17 RX ORDER — SODIUM CHLORIDE 9 MG/ML
20 INJECTION, SOLUTION INTRAVENOUS ONCE
OUTPATIENT
Start: 2025-02-24

## 2025-02-17 RX ORDER — SODIUM CHLORIDE 9 MG/ML
20 INJECTION, SOLUTION INTRAVENOUS ONCE
Status: COMPLETED | OUTPATIENT
Start: 2025-02-17 | End: 2025-02-17

## 2025-02-17 RX ORDER — FUROSEMIDE 40 MG/1
40 TABLET ORAL DAILY
Qty: 30 TABLET | Refills: 5 | Status: SHIPPED | OUTPATIENT
Start: 2025-02-17

## 2025-02-17 RX ADMIN — SODIUM CHLORIDE 20 ML/HR: 9 INJECTION, SOLUTION INTRAVENOUS at 10:41

## 2025-02-17 RX ADMIN — IRON SUCROSE 200 MG: 20 INJECTION, SOLUTION INTRAVENOUS at 10:41

## 2025-02-17 NOTE — PROGRESS NOTES
Patient here for venofer. Offers no complaints at this time. PIV established in her LFA, and tolerated entire infusion without incident. PIV removed, bandaid placed.   AVS printed.   Next appointment for 2/24/25 at 1430   Walked out of clinic with no incident.

## 2025-02-18 ENCOUNTER — RA CDI HCC (OUTPATIENT)
Dept: OTHER | Facility: HOSPITAL | Age: 85
End: 2025-02-18

## 2025-02-20 ENCOUNTER — APPOINTMENT (OUTPATIENT)
Dept: LAB | Facility: HOSPITAL | Age: 85
End: 2025-02-20
Payer: MEDICARE

## 2025-02-20 ENCOUNTER — TELEPHONE (OUTPATIENT)
Age: 85
End: 2025-02-20

## 2025-02-20 ENCOUNTER — TELEPHONE (OUTPATIENT)
Dept: CARDIOLOGY CLINIC | Facility: CLINIC | Age: 85
End: 2025-02-20

## 2025-02-20 ENCOUNTER — ANTICOAG VISIT (OUTPATIENT)
Dept: CARDIOLOGY CLINIC | Facility: CLINIC | Age: 85
End: 2025-02-20

## 2025-02-20 DIAGNOSIS — I48.0 PAROXYSMAL ATRIAL FIBRILLATION (HCC): ICD-10-CM

## 2025-02-20 DIAGNOSIS — Z79.01 LONG TERM (CURRENT) USE OF ANTICOAGULANTS: ICD-10-CM

## 2025-02-20 DIAGNOSIS — Z79.01 LONG TERM (CURRENT) USE OF ANTICOAGULANTS: Primary | ICD-10-CM

## 2025-02-20 LAB
INR PPP: 2.7 (ref 0.85–1.19)
PROTHROMBIN TIME: 29.3 SECONDS (ref 12.3–15)

## 2025-02-20 PROCEDURE — 85610 PROTHROMBIN TIME: CPT

## 2025-02-20 PROCEDURE — 36415 COLL VENOUS BLD VENIPUNCTURE: CPT

## 2025-02-20 NOTE — TELEPHONE ENCOUNTER
Pt started with head cold symptoms and they did a home covid test and it did come back negative, but hey wanted to let the provider know that she has been experiencing symptoms, back pain is 6 out of 10 but gets better with heat.

## 2025-02-20 NOTE — TELEPHONE ENCOUNTER
Hi,  Requesting a pt/inr home draw to be done around 3/20. Please call the pt to set up. Script is in the chart.     Thank you.

## 2025-02-24 ENCOUNTER — DOCUMENTATION (OUTPATIENT)
Dept: FAMILY MEDICINE CLINIC | Facility: CLINIC | Age: 85
End: 2025-02-24

## 2025-02-24 ENCOUNTER — HOSPITAL ENCOUNTER (OUTPATIENT)
Dept: RADIOLOGY | Facility: HOSPITAL | Age: 85
Discharge: HOME | DRG: 871 | End: 2025-02-24
Payer: MEDICARE

## 2025-02-24 ENCOUNTER — HOSPITAL ENCOUNTER (OUTPATIENT)
Dept: INFUSION CENTER | Facility: CLINIC | Age: 85
Discharge: HOME | End: 2025-02-24

## 2025-02-24 VITALS
SYSTOLIC BLOOD PRESSURE: 158 MMHG | DIASTOLIC BLOOD PRESSURE: 68 MMHG | TEMPERATURE: 97.9 F | HEART RATE: 64 BPM | RESPIRATION RATE: 18 BRPM

## 2025-02-24 DIAGNOSIS — R05.8 PRODUCTIVE COUGH: Primary | ICD-10-CM

## 2025-02-24 DIAGNOSIS — R05.8 PRODUCTIVE COUGH: ICD-10-CM

## 2025-02-24 DIAGNOSIS — D50.8 IRON DEFICIENCY ANEMIA SECONDARY TO INADEQUATE DIETARY IRON INTAKE: ICD-10-CM

## 2025-02-24 PROCEDURE — 71046 X-RAY EXAM CHEST 2 VIEWS: CPT

## 2025-02-24 NOTE — PROGRESS NOTES
"Patient arrives to infusion center for IV Venofer infusion. Patient offers complaints of a productive cough, general malaise, and a \"rattling\" in her chest. Patient's lung sounds assessed, audible wheezing and rattling noted. Provider notified, per Staci MONROE, patient should not receive iron today. PCP added to chat, chest xray ordered via Yana MONROE. Patient ambulatory out of unit to OP imaging. Patient also encouraged to take mucinex per provider.    Next appointment: 3/3/25 @ 1130    Patient will need to be scheduled for one Venofer since she missed this appointment.     "

## 2025-02-25 ENCOUNTER — RESULTS FOLLOW-UP (OUTPATIENT)
Dept: FAMILY MEDICINE CLINIC | Facility: CLINIC | Age: 85
End: 2025-02-25

## 2025-02-25 ENCOUNTER — APPOINTMENT (EMERGENCY)
Dept: RADIOLOGY | Facility: HOSPITAL | Age: 85
DRG: 871 | End: 2025-02-25
Payer: MEDICARE

## 2025-02-25 ENCOUNTER — HOSPITAL ENCOUNTER (INPATIENT)
Facility: HOSPITAL | Age: 85
LOS: 7 days | Discharge: NURSING, REHAB OR LONG-TERM CARE | DRG: 871 | End: 2025-03-04
Attending: EMERGENCY MEDICINE | Admitting: INTERNAL MEDICINE
Payer: MEDICARE

## 2025-02-25 ENCOUNTER — APPOINTMENT (INPATIENT)
Dept: CT IMAGING | Facility: HOSPITAL | Age: 85
DRG: 871 | End: 2025-02-25
Payer: MEDICARE

## 2025-02-25 DIAGNOSIS — R09.02 HYPOXIA: ICD-10-CM

## 2025-02-25 DIAGNOSIS — J44.1 COPD EXACERBATION (HCC): ICD-10-CM

## 2025-02-25 DIAGNOSIS — J18.9 PNEUMONIA: ICD-10-CM

## 2025-02-25 DIAGNOSIS — J10.1 INFLUENZA A: Primary | ICD-10-CM

## 2025-02-25 DIAGNOSIS — R05.9 COUGH: ICD-10-CM

## 2025-02-25 PROBLEM — A41.9 SEPSIS (HCC): Status: ACTIVE | Noted: 2025-02-25

## 2025-02-25 PROBLEM — J96.21 ACUTE AND CHRONIC RESPIRATORY FAILURE WITH HYPOXIA (HCC): Status: ACTIVE | Noted: 2021-06-10

## 2025-02-25 LAB
ALBUMIN SERPL BCG-MCNC: 3.5 G/DL (ref 3.5–5)
ALP SERPL-CCNC: 89 U/L (ref 34–104)
ALT SERPL W P-5'-P-CCNC: 21 U/L (ref 7–52)
ANION GAP SERPL CALCULATED.3IONS-SCNC: 9 MMOL/L (ref 4–13)
APTT PPP: 46 SECONDS (ref 23–34)
AST SERPL W P-5'-P-CCNC: 40 U/L (ref 13–39)
ATRIAL RATE: 72 BPM
BASE EX.OXY STD BLDV CALC-SCNC: 90.2 % (ref 60–80)
BASE EXCESS BLDV CALC-SCNC: -1.8 MMOL/L
BASOPHILS # BLD AUTO: 0.03 THOUSANDS/ÂΜL (ref 0–0.1)
BASOPHILS NFR BLD AUTO: 0 % (ref 0–1)
BILIRUB SERPL-MCNC: 0.37 MG/DL (ref 0.2–1)
BNP SERPL-MCNC: 278 PG/ML (ref 0–100)
BUN SERPL-MCNC: 27 MG/DL (ref 5–25)
CALCIUM SERPL-MCNC: 9.2 MG/DL (ref 8.4–10.2)
CHLORIDE SERPL-SCNC: 104 MMOL/L (ref 96–108)
CO2 SERPL-SCNC: 24 MMOL/L (ref 21–32)
CREAT SERPL-MCNC: 1.01 MG/DL (ref 0.6–1.3)
EOSINOPHIL # BLD AUTO: 0 THOUSAND/ÂΜL (ref 0–0.61)
EOSINOPHIL NFR BLD AUTO: 0 % (ref 0–6)
ERYTHROCYTE [DISTWIDTH] IN BLOOD BY AUTOMATED COUNT: 20.9 % (ref 11.6–15.1)
FLUAV AG UPPER RESP QL IA.RAPID: POSITIVE
FLUBV AG UPPER RESP QL IA.RAPID: NEGATIVE
GFR SERPL CREATININE-BSD FRML MDRD: 50 ML/MIN/1.73SQ M
GLUCOSE SERPL-MCNC: 135 MG/DL (ref 65–140)
HCO3 BLDV-SCNC: 22.1 MMOL/L (ref 24–30)
HCT VFR BLD AUTO: 40.2 % (ref 34.8–46.1)
HGB BLD-MCNC: 12.1 G/DL (ref 11.5–15.4)
IMM GRANULOCYTES # BLD AUTO: 0.07 THOUSAND/UL (ref 0–0.2)
IMM GRANULOCYTES NFR BLD AUTO: 0 % (ref 0–2)
INR PPP: 1.88 (ref 0.85–1.19)
L PNEUMO1 AG UR QL IA.RAPID: NEGATIVE
LACTATE SERPL-SCNC: 2 MMOL/L (ref 0.5–2)
LYMPHOCYTES # BLD AUTO: 0.53 THOUSANDS/ÂΜL (ref 0.6–4.47)
LYMPHOCYTES NFR BLD AUTO: 3 % (ref 14–44)
MCH RBC QN AUTO: 25.4 PG (ref 26.8–34.3)
MCHC RBC AUTO-ENTMCNC: 30.1 G/DL (ref 31.4–37.4)
MCV RBC AUTO: 85 FL (ref 82–98)
MONOCYTES # BLD AUTO: 0.68 THOUSAND/ÂΜL (ref 0.17–1.22)
MONOCYTES NFR BLD AUTO: 4 % (ref 4–12)
NEUTROPHILS # BLD AUTO: 14.3 THOUSANDS/ÂΜL (ref 1.85–7.62)
NEUTS SEG NFR BLD AUTO: 93 % (ref 43–75)
NRBC BLD AUTO-RTO: 0 /100 WBCS
O2 CT BLDV-SCNC: 13.7 ML/DL
PCO2 BLDV: 34.5 MM HG (ref 42–50)
PH BLDV: 7.42 [PH] (ref 7.3–7.4)
PLATELET # BLD AUTO: 298 THOUSANDS/UL (ref 149–390)
PMV BLD AUTO: 9.2 FL (ref 8.9–12.7)
PO2 BLDV: 61.9 MM HG (ref 35–45)
POTASSIUM SERPL-SCNC: 4.2 MMOL/L (ref 3.5–5.3)
PROCALCITONIN SERPL-MCNC: 1.42 NG/ML
PROT SERPL-MCNC: 7 G/DL (ref 6.4–8.4)
PROTHROMBIN TIME: 22.3 SECONDS (ref 12.3–15)
QRS AXIS: -39 DEGREES
QRSD INTERVAL: 76 MS
QT INTERVAL: 342 MS
QTC INTERVAL: 432 MS
RBC # BLD AUTO: 4.76 MILLION/UL (ref 3.81–5.12)
S PNEUM AG UR QL: NEGATIVE
SARS-COV+SARS-COV-2 AG RESP QL IA.RAPID: NEGATIVE
SODIUM SERPL-SCNC: 137 MMOL/L (ref 135–147)
T WAVE AXIS: 68 DEGREES
VENTRICULAR RATE: 96 BPM
WBC # BLD AUTO: 15.61 THOUSAND/UL (ref 4.31–10.16)

## 2025-02-25 PROCEDURE — 87811 SARS-COV-2 COVID19 W/OPTIC: CPT | Performed by: EMERGENCY MEDICINE

## 2025-02-25 PROCEDURE — 96375 TX/PRO/DX INJ NEW DRUG ADDON: CPT

## 2025-02-25 PROCEDURE — 94760 N-INVAS EAR/PLS OXIMETRY 1: CPT

## 2025-02-25 PROCEDURE — 85610 PROTHROMBIN TIME: CPT | Performed by: EMERGENCY MEDICINE

## 2025-02-25 PROCEDURE — 82805 BLOOD GASES W/O2 SATURATION: CPT | Performed by: EMERGENCY MEDICINE

## 2025-02-25 PROCEDURE — 94002 VENT MGMT INPAT INIT DAY: CPT

## 2025-02-25 PROCEDURE — 96365 THER/PROPH/DIAG IV INF INIT: CPT

## 2025-02-25 PROCEDURE — 94640 AIRWAY INHALATION TREATMENT: CPT

## 2025-02-25 PROCEDURE — 87040 BLOOD CULTURE FOR BACTERIA: CPT | Performed by: EMERGENCY MEDICINE

## 2025-02-25 PROCEDURE — 83605 ASSAY OF LACTIC ACID: CPT | Performed by: EMERGENCY MEDICINE

## 2025-02-25 PROCEDURE — 99285 EMERGENCY DEPT VISIT HI MDM: CPT | Performed by: EMERGENCY MEDICINE

## 2025-02-25 PROCEDURE — 85025 COMPLETE CBC W/AUTO DIFF WBC: CPT | Performed by: EMERGENCY MEDICINE

## 2025-02-25 PROCEDURE — 71045 X-RAY EXAM CHEST 1 VIEW: CPT

## 2025-02-25 PROCEDURE — 94660 CPAP INITIATION&MGMT: CPT

## 2025-02-25 PROCEDURE — 99223 1ST HOSP IP/OBS HIGH 75: CPT | Performed by: INTERNAL MEDICINE

## 2025-02-25 PROCEDURE — 87804 INFLUENZA ASSAY W/OPTIC: CPT | Performed by: EMERGENCY MEDICINE

## 2025-02-25 PROCEDURE — 93010 ELECTROCARDIOGRAM REPORT: CPT | Performed by: INTERNAL MEDICINE

## 2025-02-25 PROCEDURE — 36415 COLL VENOUS BLD VENIPUNCTURE: CPT | Performed by: EMERGENCY MEDICINE

## 2025-02-25 PROCEDURE — 84145 PROCALCITONIN (PCT): CPT | Performed by: EMERGENCY MEDICINE

## 2025-02-25 PROCEDURE — 85730 THROMBOPLASTIN TIME PARTIAL: CPT | Performed by: EMERGENCY MEDICINE

## 2025-02-25 PROCEDURE — 93005 ELECTROCARDIOGRAM TRACING: CPT

## 2025-02-25 PROCEDURE — 96366 THER/PROPH/DIAG IV INF ADDON: CPT

## 2025-02-25 PROCEDURE — 99285 EMERGENCY DEPT VISIT HI MDM: CPT

## 2025-02-25 PROCEDURE — 83880 ASSAY OF NATRIURETIC PEPTIDE: CPT | Performed by: EMERGENCY MEDICINE

## 2025-02-25 PROCEDURE — 71250 CT THORAX DX C-: CPT

## 2025-02-25 PROCEDURE — 96368 THER/DIAG CONCURRENT INF: CPT

## 2025-02-25 PROCEDURE — 87449 NOS EACH ORGANISM AG IA: CPT | Performed by: NURSE PRACTITIONER

## 2025-02-25 PROCEDURE — 80053 COMPREHEN METABOLIC PANEL: CPT | Performed by: EMERGENCY MEDICINE

## 2025-02-25 PROCEDURE — 94664 DEMO&/EVAL PT USE INHALER: CPT

## 2025-02-25 RX ORDER — ALBUTEROL SULFATE 0.83 MG/ML
5 SOLUTION RESPIRATORY (INHALATION) ONCE
Status: COMPLETED | OUTPATIENT
Start: 2025-02-25 | End: 2025-02-25

## 2025-02-25 RX ORDER — ONDANSETRON 2 MG/ML
4 INJECTION INTRAMUSCULAR; INTRAVENOUS EVERY 6 HOURS PRN
Status: DISCONTINUED | OUTPATIENT
Start: 2025-02-25 | End: 2025-03-04 | Stop reason: HOSPADM

## 2025-02-25 RX ORDER — ALBUTEROL SULFATE 0.83 MG/ML
2.5 SOLUTION RESPIRATORY (INHALATION) EVERY 4 HOURS PRN
Status: DISCONTINUED | OUTPATIENT
Start: 2025-02-25 | End: 2025-03-04 | Stop reason: HOSPADM

## 2025-02-25 RX ORDER — METHYLPREDNISOLONE SODIUM SUCCINATE 125 MG/2ML
80 INJECTION, POWDER, LYOPHILIZED, FOR SOLUTION INTRAMUSCULAR; INTRAVENOUS ONCE
Status: COMPLETED | OUTPATIENT
Start: 2025-02-25 | End: 2025-02-25

## 2025-02-25 RX ORDER — GUAIFENESIN 600 MG/1
600 TABLET, EXTENDED RELEASE ORAL 2 TIMES DAILY
Status: DISCONTINUED | OUTPATIENT
Start: 2025-02-25 | End: 2025-03-04 | Stop reason: HOSPADM

## 2025-02-25 RX ORDER — PREGABALIN 25 MG/1
25 CAPSULE ORAL 2 TIMES DAILY
Status: DISCONTINUED | OUTPATIENT
Start: 2025-02-25 | End: 2025-03-04 | Stop reason: HOSPADM

## 2025-02-25 RX ORDER — PANTOPRAZOLE SODIUM 40 MG/1
40 TABLET, DELAYED RELEASE ORAL
Status: DISCONTINUED | OUTPATIENT
Start: 2025-02-26 | End: 2025-03-04 | Stop reason: HOSPADM

## 2025-02-25 RX ORDER — FUROSEMIDE 40 MG/1
40 TABLET ORAL DAILY
Status: DISCONTINUED | OUTPATIENT
Start: 2025-02-25 | End: 2025-03-04 | Stop reason: HOSPADM

## 2025-02-25 RX ORDER — ACETAMINOPHEN 10 MG/ML
1000 INJECTION, SOLUTION INTRAVENOUS ONCE
Status: COMPLETED | OUTPATIENT
Start: 2025-02-25 | End: 2025-02-25

## 2025-02-25 RX ORDER — ASPIRIN 81 MG/1
81 TABLET ORAL DAILY
Status: DISCONTINUED | OUTPATIENT
Start: 2025-02-25 | End: 2025-03-04 | Stop reason: HOSPADM

## 2025-02-25 RX ORDER — PRAVASTATIN SODIUM 80 MG/1
80 TABLET ORAL
Status: DISCONTINUED | OUTPATIENT
Start: 2025-02-25 | End: 2025-03-04 | Stop reason: HOSPADM

## 2025-02-25 RX ORDER — LEVOTHYROXINE SODIUM 50 UG/1
50 TABLET ORAL DAILY
Status: DISCONTINUED | OUTPATIENT
Start: 2025-02-25 | End: 2025-03-04 | Stop reason: HOSPADM

## 2025-02-25 RX ORDER — WARFARIN SODIUM 5 MG/1
5 TABLET ORAL
Status: DISCONTINUED | OUTPATIENT
Start: 2025-02-25 | End: 2025-02-27

## 2025-02-25 RX ORDER — ACETAMINOPHEN 325 MG/1
650 TABLET ORAL EVERY 6 HOURS PRN
Status: DISCONTINUED | OUTPATIENT
Start: 2025-02-25 | End: 2025-03-04 | Stop reason: HOSPADM

## 2025-02-25 RX ADMIN — LEVOTHYROXINE SODIUM 50 MCG: 0.05 TABLET ORAL at 16:22

## 2025-02-25 RX ADMIN — WARFARIN SODIUM 5 MG: 5 TABLET ORAL at 17:47

## 2025-02-25 RX ADMIN — VANCOMYCIN HYDROCHLORIDE 1250 MG: 1 INJECTION, POWDER, LYOPHILIZED, FOR SOLUTION INTRAVENOUS at 09:06

## 2025-02-25 RX ADMIN — GUAIFENESIN 600 MG: 600 TABLET ORAL at 17:47

## 2025-02-25 RX ADMIN — ACETAMINOPHEN 650 MG: 325 TABLET, FILM COATED ORAL at 17:48

## 2025-02-25 RX ADMIN — METHYLPREDNISOLONE SODIUM SUCCINATE 80 MG: 125 INJECTION, POWDER, FOR SOLUTION INTRAMUSCULAR; INTRAVENOUS at 09:16

## 2025-02-25 RX ADMIN — CEFTRIAXONE 1000 MG: 1 INJECTION, POWDER, FOR SOLUTION INTRAMUSCULAR; INTRAVENOUS at 16:37

## 2025-02-25 RX ADMIN — ASPIRIN 81 MG: 81 TABLET, COATED ORAL at 16:22

## 2025-02-25 RX ADMIN — SERTRALINE HYDROCHLORIDE 100 MG: 50 TABLET ORAL at 16:22

## 2025-02-25 RX ADMIN — SODIUM CHLORIDE, SODIUM LACTATE, POTASSIUM CHLORIDE, AND CALCIUM CHLORIDE 1000 ML: .6; .31; .03; .02 INJECTION, SOLUTION INTRAVENOUS at 08:21

## 2025-02-25 RX ADMIN — IPRATROPIUM BROMIDE 0.5 MG: 0.5 SOLUTION RESPIRATORY (INHALATION) at 09:16

## 2025-02-25 RX ADMIN — SODIUM CHLORIDE, SODIUM LACTATE, POTASSIUM CHLORIDE, AND CALCIUM CHLORIDE 1000 ML: .6; .31; .03; .02 INJECTION, SOLUTION INTRAVENOUS at 09:06

## 2025-02-25 RX ADMIN — ALBUTEROL SULFATE 5 MG: 2.5 SOLUTION RESPIRATORY (INHALATION) at 09:16

## 2025-02-25 RX ADMIN — ACETAMINOPHEN 1000 MG: 10 INJECTION INTRAVENOUS at 08:22

## 2025-02-25 RX ADMIN — PREGABALIN 25 MG: 25 CAPSULE ORAL at 17:47

## 2025-02-25 RX ADMIN — CEFEPIME 2000 MG: 2 INJECTION, POWDER, FOR SOLUTION INTRAVENOUS at 08:21

## 2025-02-25 RX ADMIN — PRAVASTATIN SODIUM 80 MG: 80 TABLET ORAL at 16:22

## 2025-02-25 NOTE — ASSESSMENT & PLAN NOTE
Known history of A-fib  Currently not on beta-blocker  Patient on Coumadin for AC  Resume home Coumadin dose  Continue INR in a.m.

## 2025-02-25 NOTE — ASSESSMENT & PLAN NOTE
Patient meeting sepsis criteria by fever, tachycardia, tachypnea leukocytosis  Continue IV antibiotics  Blood cultures x 2 pending  No evidence currently of organ dysfunction  Of note INR is elevated however patient is on Coumadin currently subtherapeutic  Respiratory protocol  CBC BMP in a.m.

## 2025-02-25 NOTE — ASSESSMENT & PLAN NOTE
Patient on imaging with concern for a left lower lung opacity  Patient appears more clinically ill than what x-ray is showing will obtain a CT of the chest without contrast  Patient given IV cefepime in the ED  Will continue patient on IV Rocephin  Will check a strep and Legionella  Procalcitonin elevated at 1.42 repeat in a.m.  Respiratory protocol  Incentive spirometry

## 2025-02-25 NOTE — H&P
H&P - Hospitalist   Name: Marisol Otoole 84 y.o. female I MRN: [id number]  Unit/Bed#: ED 29 I Date of Admission: 2/25/2025   Date of Service: 2/25/2025 I Hospital Day: 0     Assessment & Plan  Pneumonia  Patient on imaging with concern for a left lower lung opacity  Patient appears more clinically ill than what x-ray is showing will obtain a CT of the chest without contrast  Patient given IV cefepime in the ED  Will continue patient on IV Rocephin  Will check a strep and Legionella  Procalcitonin elevated at 1.42 repeat in a.m.  Respiratory protocol  Incentive spirometry  Sepsis (HCC)  Patient meeting sepsis criteria by fever, tachycardia, tachypnea leukocytosis  Continue IV antibiotics  Blood cultures x 2 pending  No evidence currently of organ dysfunction  Of note INR is elevated however patient is on Coumadin currently subtherapeutic  Respiratory protocol  CBC BMP in a.m.  Acute and chronic respiratory failure with hypoxia (HCC)  Patient is CPAP and O2 dependent at night only  Patient upon admission to be desatting in ED and 70s requiring BiPAP she was weaned to 4 L nasal cannula  Continue to wean oxygen maintaining sats greater than 90%  Respiratory protocol  GERD (gastroesophageal reflux disease)  Continue PPI  Acquired hypothyroidism  Continue levothyroxine  Primary hypertension  Blood pressure soft on admission improved with IV fluids  We will hold Lasix for 48 hours  Continue to monitor routine vitals per unit  JANICE (obstructive sleep apnea)  Continue CPAP nightly  Morbid obesity due to excess calories (HCC)  Evident by BMI of 43  Weight loss through diet and exercise discussed  Chronic heart failure with preserved ejection fraction (HFpEF) (HCC)  Wt Readings from Last 3 Encounters:   02/25/25 81.3 kg (179 lb 3.7 oz)   01/31/25 79.8 kg (175 lb 14.8 oz)   01/29/25 79.8 kg (175 lb 14.8 oz)     Last echo was from 2022 LVEF 60%, grade 1 diastolic dysfunction status post TAVR no evidence of volume overload  Weight  appears stable at baseline  I/O, daily weight  And diuretic in setting of sepsis for 48 hours will resume if any signs of volume overload sooner        Paroxysmal atrial fibrillation (HCC)  Known history of A-fib  Currently not on beta-blocker  Patient on Coumadin for AC  Resume home Coumadin dose  Continue INR in a.m.  Influenza A  4-day history of increased shortness of breath not feeling well  Patient out of the window for Tamiflu  Supportive measures      VTE Pharmacologic Prophylaxis: VTE Score: 9 High Risk (Score >/= 5) - Pharmacological DVT Prophylaxis Ordered: warfarin (Coumadin). Sequential Compression Devices Ordered.  Code Status: Level 3 - DNAR and DNI   Discussion with family: Patient declined call to .   Patient reports has no living children has never been  her sister has since passed away and she lives with a friend reports no history of having a living well  Anticipated Length of Stay: Patient will be admitted on an inpatient basis with an anticipated length of stay of greater than 2 midnights secondary to pneumonia sepsis with need for IV treatment and further acute monitoring in setting of acute respiratory failure sepsis and pneumonia.    History of Present Illness   Chief Complaint: I have not felt well since Saturday    Marisol Otoole is a 84 y.o. female with a PMH of CHF COPD GERD history of pneumonia hyperlipidemia hypertension hypothyroidism morbid obesity obstructive sleep apnea severe aortic stenosis status post TAVR A-fib on Coumadin who presents with increasing shortness of breath clinically feeling unwell since Saturday.  Patient reports she attempted to go for her iron infusion yesterday was turned away because of how bad her lungs felt and she returned home where she clinically continued to feel worse and unwell.  Patient reports fevers shakes shortness of breath called EMS to be brought in for further evaluation.  Patient reports she was attending regular things  such as Religious and getting food for the home until she started not feeling well on Saturday.  Patient reports poor appetite shortness of breath and generally feeling unwell.    Review of Systems   Constitutional:  Positive for activity change, appetite change, fatigue and fever.   HENT: Negative.  Negative for congestion and postnasal drip.    Respiratory:  Positive for cough and shortness of breath.    Cardiovascular:  Negative for chest pain, palpitations and leg swelling.   Gastrointestinal: Negative.  Negative for abdominal pain, diarrhea, nausea and vomiting.   Genitourinary: Negative.  Negative for difficulty urinating and dysuria.   Musculoskeletal:  Positive for myalgias.   Neurological:  Positive for weakness. Negative for dizziness, light-headedness and headaches.   Psychiatric/Behavioral: Negative.         Historical Information   Past Medical History:   Diagnosis Date    Anemia 08/22/2018    Anxiety     Arthritis     AVB (atrioventricular block)     first degree    Cataract     CHF (congestive heart failure) ([facility])     COPD, mild ([facility])     Coronary artery disease     Dislocation of right shoulder joint     Frequent UTI     GERD (gastroesophageal reflux disease)     H/O: pneumonia     Heme positive stool     Hyperlipidemia     Hypertension     Hypothyroidism     Morbid obesity with BMI of 50.0-59.9, adult ([facility])     Obesity, morbid ([facility]) 08/22/2018    JANICE on CPAP     Pulmonary hypertension ([facility]) 08/22/2018    Severe aortic stenosis     Simple goiter     Skin cyst     within the armpits, right    Wears glasses      Past Surgical History:   Procedure Laterality Date    BREAST BIOPSY      CARDIAC CATHETERIZATION      CARDIAC ELECTROPHYSIOLOGY PROCEDURE N/A 8/12/2024    Procedure: Cardiac pacer implant;  Surgeon: Clarke Zuluaga MD;  Location: [facility];  Service: Cardiology    CARPAL TUNNEL RELEASE Bilateral     CHOLECYSTECTOMY      DILATION AND CURETTAGE OF UTERUS      HYSTEROSCOPY      MASTOID SURGERY       PA COLONOSCOPY FLX DX W/COLLJ SPEC WHEN PFRMD N/A 9/6/2018    Procedure: COLONOSCOPY;  Surgeon: Shree Sosa III, MD;  Location: [facility];  Service: Gastroenterology    PA ECHO TRANSESOPHAG R-T 2D W/PRB IMG ACQUISJ I&R N/A 10/9/2018    Procedure: INTRA-OP TRANSESOPHAGEAL ECHOCARDIOGRAM (GARRISON);  Surgeon: Kushal Camarena DO;  Location: [facility];  Service: Cardiac Surgery    PA ESOPHAGOGASTRODUODENOSCOPY TRANSORAL DIAGNOSTIC N/A 8/31/2018    Procedure: ESOPHAGOGASTRODUODENOSCOPY (EGD);  Surgeon: Shree Sosa III, MD;  Location: [facility];  Service: Gastroenterology    PA REPLACE AORTIC VALVE OPENFEMORAL ARTERY APPROACH N/A 10/9/2018    Procedure: REPLACEMENT AORTIC VALVE TRANSCATHETER (TAVR) TRANSFEMORAL W/ 23 MM MENDOZA NOE S3 VALVE (ACCESS OF LEFT);  Surgeon: Kushal Camarena DO;  Location: [facility];  Service: Cardiac Surgery    TOTAL HIP ARTHROPLASTY Left 2007    TOTAL KNEE ARTHROPLASTY Bilateral      Social History     Tobacco Use    Smoking status: Never     Passive exposure: Never    Smokeless tobacco: Never   Vaping Use    Vaping status: Never Used   Substance and Sexual Activity    Alcohol use: Not Currently    Drug use: Never    Sexual activity: Never     E-Cigarette/Vaping    E-Cigarette Use Never User      E-Cigarette/Vaping Substances    Nicotine No     THC No     CBD No     Flavoring No     Other No     Unknown No      Family history non-contributory  Social History:  Marital Status: Single   Occupation: retired  Patient Pre-hospital Living Situation: Home  Patient Pre-hospital Level of Mobility: walks  Patient Pre-hospital Diet Restrictions: none    Meds/Allergies   I have reviewed home medications with patient personally.  Prior to Admission medications    Medication Sig Start Date End Date Taking? Authorizing Provider   acetaminophen (TYLENOL) 500 mg tablet Take 500 mg by mouth every 6 (six) hours as needed    Historical Provider, MD   aspirin (ECOTRIN LOW STRENGTH) 81 mg EC  tablet Take 1 tablet (81 mg total) by mouth daily 10/11/18   Fatou Schmitz PA-C   b complex vitamins capsule Take 1 capsule by mouth 2 (two) times a day      Historical Provider, MD   Blood Glucose Monitoring Suppl (OneTouch Verio Reflect) w/Device KIT Check blood sugars once daily. Please substitute with appropriate alternative as covered by patient's insurance. Dx: E11.65 6/28/24   MABEL Donahue   Calcium Carb-Cholecalciferol (CALCIUM 600 + D PO) Take 1 tablet by mouth 2 (two) times a day    Historical Provider, MD   Cranberry 250 MG TABS Take by mouth    Historical Provider, MD   fluticasone (FLONASE) 50 mcg/act nasal spray 1 spray into each nostril daily 8/22/24   MABEL Hallman   furosemide (LASIX) 40 mg tablet TAKE 1 TABLET BY MOUTH EVERY DAY 2/17/25   Bro Esteves MD   glucose blood (OneTouch Verio) test strip Check blood sugars once daily. Please substitute with appropriate alternative as covered by patient's insurance. Dx: E11.65 6/28/24   MABEL Donahue   Lancets (onetouch ultrasoft) lancets Use in the morning Use as instructed 6/28/24   MABEL Donahue   levothyroxine 50 mcg tablet TAKE 1 TABLET BY MOUTH EVERY DAY 1/7/25   MABEL Hallman   omeprazole (PriLOSEC) 40 MG capsule TAKE 1 CAPSULE BY MOUTH TWICE A DAY 11/1/24   MABEL HallmanTouch Delica Lancets 33G MISC Check blood sugars once daily. Please substitute with appropriate alternative as covered by patient's insurance. Dx: E11.65 6/28/24   MABEL Donahue   OneTouch Verio test strip USE 1 EACH IN THE MORNING USE AS INSTRUCTED 2/10/25   MABEL Hallman   oxybutynin (DITROPAN-XL) 5 mg 24 hr tablet TAKE 1 TABLET (5 MG TOTAL) BY MOUTH DAILY. 1/26/25   MABEL Hallman   oxygen gas Inhale 2 L/min continuous. Indications: copd    Historical Provider, MD   pregabalin (LYRICA) 25 mg capsule Take 1 capsule (25 mg total) by mouth 2 (two) times a day 2/10/25   MABEL Oliveira   sertraline (ZOLOFT) 100 mg  tablet TAKE 1 TABLET BY MOUTH EVERY DAY 1/26/25   MABEL Hallman   simvastatin (ZOCOR) 40 mg tablet TAKE 1 TABLET BY MOUTH EVERYDAY AT BEDTIME 11/1/24   MABEL Donahue   warfarin (Coumadin) 2.5 mg tablet Take 1 tablet (2.5mg) Mon-Sat. Take 2 tablets (5mg) on Sun or as directed 2/7/25   Lexi Durham PA-C   cyclobenzaprine (FLEXERIL) 5 mg tablet Take 1 tablet (5 mg total) by mouth 2 (two) times a day as needed for muscle spasms 8/30/22 9/5/22  MABEL Hallman   ferrous sulfate 324 (65 Fe) mg Take 1 tablet (324 mg total) by mouth daily before breakfast Take 1 tablet every other day. 10/24/24 2/25/25  MABEL Hallman   meloxicam (Mobic) 15 mg tablet Take 1 tablet (15 mg total) by mouth daily 8/30/22 9/5/22  MABEL Hallman     Allergies   Allergen Reactions    Latex Rash    Neosporin [Neomycin-Bacitracin Zn-Polymyx] Rash and Other (See Comments)     hives per [facility] order       Objective :  Temp:  [98.8 °F (37.1 °C)-103.4 °F (39.7 °C)] 98.8 °F (37.1 °C)  HR:  [69-98] 71  BP: ()/(49-62) 107/52  Resp:  [23-29] 24  SpO2:  [90 %-94 %] 94 %  O2 Device: Nasal cannula  Nasal Cannula O2 Flow Rate (L/min):  [4 L/min] 4 L/min    Physical Exam  Vitals and nursing note reviewed.   Constitutional:       General: She is not in acute distress.     Appearance: She is well-developed. She is obese. She is ill-appearing.   HENT:      Head: Normocephalic and atraumatic.   Eyes:      Conjunctiva/sclera: Conjunctivae normal.   Cardiovascular:      Rate and Rhythm: Normal rate and regular rhythm.      Heart sounds: No murmur heard.  Pulmonary:      Effort: Accessory muscle usage present. No respiratory distress.      Breath sounds: Decreased air movement present. Examination of the right-upper field reveals rhonchi. Examination of the left-upper field reveals rhonchi. Examination of the right-middle field reveals rales. Examination of the right-lower field reveals rales. Examination of the left-lower field  reveals rales. Rhonchi and rales present.   Abdominal:      Palpations: Abdomen is soft.      Tenderness: There is no abdominal tenderness.   Musculoskeletal:         General: No swelling.      Cervical back: Neck supple.   Skin:     General: Skin is warm and dry.      Capillary Refill: Capillary refill takes less than 2 seconds.   Neurological:      Mental Status: She is alert and oriented to person, place, and time.   Psychiatric:         Mood and Affect: Mood normal.           Lines/Drains:            Lab Results: I have reviewed the following results:  Results from last 7 days   Lab Units 02/25/25  0809   WBC Thousand/uL 15.61*   HEMOGLOBIN g/dL 12.1   HEMATOCRIT % 40.2   PLATELETS Thousands/uL 298   SEGS PCT % 93*   LYMPHO PCT % 3*   MONO PCT % 4   EOS PCT % 0     Results from last 7 days   Lab Units 02/25/25  0838   SODIUM mmol/L 137   POTASSIUM mmol/L 4.2   CHLORIDE mmol/L 104   CO2 mmol/L 24   BUN mg/dL 27*   CREATININE mg/dL 1.01   ANION GAP mmol/L 9   CALCIUM mg/dL 9.2   ALBUMIN g/dL 3.5   TOTAL BILIRUBIN mg/dL 0.37   ALK PHOS U/L 89   ALT U/L 21   AST U/L 40*   GLUCOSE RANDOM mg/dL 135     Results from last 7 days   Lab Units 02/25/25  0809   INR  1.88*         Lab Results   Component Value Date    HGBA1C 6.1 (H) 08/18/2023    HGBA1C 5.7 (H) 07/11/2023    HGBA1C 5.9 (H) 03/17/2023     Results from last 7 days   Lab Units 02/25/25  0838   LACTIC ACID mmol/L 2.0   PROCALCITONIN ng/ml 1.42*       Imaging Results Review: I reviewed radiology reports from this admission including: chest xray.  Other Study Results Review: EKG was reviewed.     Administrative Statements   I have spent a total time of 50 minutes in caring for this patient on the day of the visit/encounter including Instructions for management, Patient and family education, Documenting in the medical record, Reviewing/placing orders in the medical record (including tests, medications, and/or procedures), and Obtaining or reviewing history  .    **  Please Note: This note has been constructed using a voice recognition system. **

## 2025-02-25 NOTE — RESPIRATORY THERAPY NOTE
RT Ventilator Management Note  Marisol Otoole 84 y.o. female MRN: [id number]  Unit/Bed#: ED 29 Encounter: [id number]      Daily Screen    No data found in the last 10 encounters.           Physical Exam:   Assessment Type: (P) Assess only  General Appearance: (P) Awake, Alert  Respiratory Pattern: (P) Tachypneic, Spontaneous, Assisted  Chest Assessment: (P) Chest expansion symmetrical  Bilateral Breath Sounds: (P) Diminished, Scattered, Coarse  Cough: (P) None  O2 Device: (P) v60      Resp Comments: (P) Pt w/ hx of CHF, COPD, and JANICE.  Has not been feeling well since Saturday.  Came in today d/t increased SOB/WOB.  Arrived on EMS CPAP and placed on BiPAP.     02/25/25 0818   Respiratory Assessment   Assessment Type Assess only   General Appearance Awake;Alert   Respiratory Pattern Tachypneic;Spontaneous;Assisted   Chest Assessment Chest expansion symmetrical   Bilateral Breath Sounds Diminished;Scattered;Coarse   Cough None   Resp Comments Pt w/ hx of CHF, COPD, and JANICE.  Has not been feeling well since Saturday.  Came in today d/t increased SOB/WOB.  Arrived on EMS CPAP and placed on BiPAP.   O2 Device v60   Non-Invasive Information   O2 Interface Device Face mask   Non-Invasive Ventilation Mode BiPAP   $ Continous NIV Initial   SpO2 94 %   $ Pulse Oximetry Spot Check Charge Completed   Non-Invasive Settings   IPAP (cm) 16 cm   EPAP (cm) 8 cm   Rate (Set) 8   FiO2 (%) 40   Pressure Support (cm H2O) 8   Rise Time 2   Inspiratory Time (Set) 1   Temperature (Set)   (passover)   Non-Invasive Readings   Total Rate 29   MV ([facility]) 13.7   Peak Pressure (Obs) 17   Spontaneous Vt (mL) 476   Heater Temperature (Obs)   (passover)   Leak (lpm) 7   Skin Intervention Skin intact   Non-Invasive Alarms   Insp Pressure High (cm H20) 25   Insp Pressure Low (cm H20) 5   Low Insp Pressure Time (sec) 20 sec   MV Low (L/min) 2   Vt High (mL) 1200   Vt Low (mL) 200   High Resp Rate (BPM) 50 BPM   Low Resp Rate (BPM) 6 BPM   Maintenance    Water bag changed No

## 2025-02-25 NOTE — ASSESSMENT & PLAN NOTE
4-day history of increased shortness of breath not feeling well  Patient out of the window for Tamiflu  Supportive measures

## 2025-02-25 NOTE — ASSESSMENT & PLAN NOTE
Blood pressure soft on admission improved with IV fluids  We will hold Lasix for 48 hours  Continue to monitor routine vitals per unit

## 2025-02-25 NOTE — RESPIRATORY THERAPY NOTE
RT Protocol Note  Marisol Otoole 84 y.o. female MRN: [id number]  Unit/Bed#: -01 Encounter: [id number]    Assessment    Principal Problem:    Pneumonia  Active Problems:    GERD (gastroesophageal reflux disease)    Acquired hypothyroidism    Primary hypertension    JANICE (obstructive sleep apnea)    Morbid obesity due to excess calories ([facility])    Chronic heart failure with preserved ejection fraction (HFpEF) ([facility])    Acute and chronic respiratory failure with hypoxia ([facility])    Paroxysmal atrial fibrillation (HCC)    Sepsis ([facility])    Influenza A      Home Pulmonary Medications:  Inhaler    Home Devices/Therapy: Home O2, BiPAP/CPAP    Past Medical History:   Diagnosis Date    Anemia 08/22/2018    Anxiety     Arthritis     AVB (atrioventricular block)     first degree    Cataract     CHF (congestive heart failure) ([facility])     COPD, mild ([facility])     Coronary artery disease     Dislocation of right shoulder joint     Frequent UTI     GERD (gastroesophageal reflux disease)     H/O: pneumonia     Heme positive stool     Hyperlipidemia     Hypertension     Hypothyroidism     Morbid obesity with BMI of 50.0-59.9, adult ([facility])     Obesity, morbid ([facility]) 08/22/2018    JANICE on CPAP     Pulmonary hypertension ([facility]) 08/22/2018    Severe aortic stenosis     Simple goiter     Skin cyst     within the armpits, right    Wears glasses      Social History     Socioeconomic History    Marital status: Single     Spouse name: None    Number of children: None    Years of education: None    Highest education level: None   Occupational History    Occupation: retired   Tobacco Use    Smoking status: Never     Passive exposure: Never    Smokeless tobacco: Never   Vaping Use    Vaping status: Never Used   Substance and Sexual Activity    Alcohol use: Not Currently    Drug use: Never    Sexual activity: Never   Other Topics Concern    None   Social History Narrative    Denied drinking coffee    Denied exercise habits    Most recent tobacco use  [email or web address]      Do you currently or have you served in the [place] [employer]:   No      Were you activated, into active duty, as a member of the [employer] or as a Reservist:   No          Social Drivers of Health     Financial Resource Strain: Low Risk  (10/24/2023)    Overall Financial Resource Strain (CARDIA)     Difficulty of Paying Living Expenses: Not hard at all   Food Insecurity: No Food Insecurity (2025)    Nursing - Inadequate Food Risk Classification     Worried About Running Out of Food in the Last Year: Never true     Ran Out of Food in the Last Year: Never true     Ran Out of Food in the Last Year: Never true   Transportation Needs: No Transportation Needs (2025)    Nursing - Transportation Risk Classification     Lack of Transportation: Not on file     Lack of Transportation: No   Physical Activity: Not on file   Stress: Not on file   Social Connections: Not on file   Intimate Partner Violence: Unknown (2025)    Nursing IPS     Feels Physically and Emotionally Safe: Not on file     Physically Hurt by Someone: Not on file     Humiliated or Emotionally Abused by Someone: Not on file     Physically Hurt by Someone: No     Hurt or Threatened by Someone: No   Housing Stability: Unknown (2025)    Nursing: Inadequate Housing Risk Classification     Has Housing: Not on file     Worried About Losing Housing: Not on file     Unable to Get Utilities: Not on file     Unable to Pay for Housing in the Last Year: No     Has Housin       Subjective         Objective    Physical Exam:   Assessment Type: Assess only  General Appearance: Awake, Alert  Respiratory Pattern: Normal  Chest Assessment: Chest expansion symmetrical  Bilateral Breath Sounds: Diminished  Cough: None  O2 Device: nc    Vitals:  Blood pressure 106/50, pulse 60, temperature 97.7 °F (36.5 °C), resp. rate 20, weight 81.3 kg (179 lb 3.7 oz), SpO2 94%, not currently breastfeeding.          Imaging and other  studies: Results Review Statement: No pertinent imaging studies reviewed.    O2 Device: nc     Plan    Respiratory Plan: Mild Distress pathway        Resp Comments: Pt w/ PMH of COPD, CHF, and JANICE.  Pt uses CPAP at home w/ O2 bleed-in.  Has albuterol inhaler.  Admitted w/ PNA and SOB.  Pt resting comfortably now.  Will order PRN neb at this time.

## 2025-02-25 NOTE — ASSESSMENT & PLAN NOTE
Patient is CPAP and O2 dependent at night only  Patient upon admission to be desatting in ED and 70s requiring BiPAP she was weaned to 4 L nasal cannula  Continue to wean oxygen maintaining sats greater than 90%  Respiratory protocol

## 2025-02-25 NOTE — ED PROVIDER NOTES
Time reflects when diagnosis was documented in both MDM as applicable and the Disposition within this note       Time User Action Codes Description Comment    2/25/2025 10:57 AM Deanna Gilbert [J10.1] Influenza A     2/25/2025 10:57 AM Deanna Gilbert [R09.02] Hypoxia     2/25/2025 10:57 AM Deanna Gilbert [J18.9] Pneumonia           ED Disposition       ED Disposition   Admit    Condition   Stable    Date/Time   Tue Feb 25, 2025 10:59 AM    Comment   Case was discussed with Dr. Villar and the patient's admission status was agreed to be Admission Status: inpatient status to the service of Dr. Villar .               Assessment & Plan       Medical Decision Making  Amount and/or Complexity of Data Reviewed  Labs: ordered.  Radiology: ordered.    Risk  Prescription drug management.  Decision regarding hospitalization.      Flu positive, improved after nebs, weaned to nasal cannula oxygen. Will admit for further treatment.       Medications   lactated ringers bolus 1,000 mL (0 mL Intravenous Stopped 2/25/25 1020)     Followed by   lactated ringers bolus 1,000 mL (0 mL Intravenous Stopped 2/25/25 1020)   cefepime (MAXIPIME) 2 g/50 mL dextrose IVPB (0 mg Intravenous Stopped 2/25/25 0912)   vancomycin (VANCOCIN) 1,250 mg in sodium chloride 0.9 % 250 mL IVPB (0 mg Intravenous Stopped 2/25/25 1237)   acetaminophen (Ofirmev) injection 1,000 mg (0 mg Intravenous Stopped 2/25/25 0916)   albuterol inhalation solution 5 mg (5 mg Nebulization Given 2/25/25 0916)   ipratropium (ATROVENT) 0.02 % inhalation solution 0.5 mg (0.5 mg Nebulization Given 2/25/25 0916)   methylPREDNISolone sodium succinate (Solu-MEDROL) injection 80 mg (80 mg Intravenous Given 2/25/25 0916)       ED Risk Strat Scores                                                History of Present Illness       Chief Complaint   Patient presents with    Shortness of Breath     Pt arrives via EMS from home with increased SOB over the last 2 days, hx of CHF.  Sat sin 70's for EMS crew. Fever of 104.5. Pt arrives on CPAP in place.        Past Medical History:   Diagnosis Date    Anemia 08/22/2018    Anxiety     Arthritis     AVB (atrioventricular block)     first degree    Cataract     CHF (congestive heart failure) (HCC)     COPD, mild (HCC)     Coronary artery disease     Dislocation of right shoulder joint     Frequent UTI     GERD (gastroesophageal reflux disease)     H/O: pneumonia     Heme positive stool     Hyperlipidemia     Hypertension     Hypothyroidism     Morbid obesity with BMI of 50.0-59.9, adult (HCC)     Obesity, morbid (HCC) 08/22/2018    JANICE on CPAP     Pulmonary hypertension (HCC) 08/22/2018    Severe aortic stenosis     Simple goiter     Skin cyst     within the armpits, right    Wears glasses       Past Surgical History:   Procedure Laterality Date    BREAST BIOPSY      CARDIAC CATHETERIZATION      CARDIAC ELECTROPHYSIOLOGY PROCEDURE N/A 8/12/2024    Procedure: Cardiac pacer implant;  Surgeon: Clarke Zuluaga MD;  [facility]: [facility];  Service: Cardiology    CARPAL TUNNEL RELEASE Bilateral     CHOLECYSTECTOMY      DILATION AND CURETTAGE OF UTERUS      HYSTEROSCOPY      MASTOID SURGERY      KY COLONOSCOPY FLX DX W/COLLJ SPEC WHEN PFRMD N/A 9/6/2018    Procedure: COLONOSCOPY;  Surgeon: Shree Sosa III, MD;  Location: [facility];  Service: Gastroenterology    KY ECHO TRANSESOPHAG R-T 2D W/PRB IMG ACQUISJ I&R N/A 10/9/2018    Procedure: INTRA-OP TRANSESOPHAGEAL ECHOCARDIOGRAM (GARRISON);  Surgeon: Kushal Camarena DO;  Location:  MAIN OR;  Service: Cardiac Surgery    KY ESOPHAGOGASTRODUODENOSCOPY TRANSORAL DIAGNOSTIC N/A 8/31/2018    Procedure: ESOPHAGOGASTRODUODENOSCOPY (EGD);  Surgeon: Shree Sosa III, MD;  Location: [facility];  Service: Gastroenterology    KY REPLACE AORTIC VALVE OPENFEMORAL ARTERY APPROACH N/A 10/9/2018    Procedure: REPLACEMENT AORTIC VALVE TRANSCATHETER (TAVR) TRANSFEMORAL W/ 23 MM MENDOZA NOE S3 VALVE (ACCESS OF  LEFT);  Surgeon: Kushal Camarena DO;  Location: [facility];  Service: Cardiac Surgery    TOTAL HIP ARTHROPLASTY Left 2007    TOTAL KNEE ARTHROPLASTY Bilateral       Family History   Problem Relation Age of Onset    Diabetes Mother     Stroke Mother     Cancer Father     Lung cancer Father     Diabetes Sister     Heart disease Sister     Hypertension Sister     Coronary artery disease Family     Diabetes Family     Hypertension Family     Cancer Family     Stroke Family     Thyroid disease Neg Hx       Social History     Tobacco Use    Smoking status: Never     Passive exposure: Never    Smokeless tobacco: Never   Vaping Use    Vaping status: Never Used   Substance Use Topics    Alcohol use: Not Currently    Drug use: Never      E-Cigarette/Vaping    E-Cigarette Use Never User       E-Cigarette/Vaping Substances    Nicotine No     THC No     CBD No     Flavoring No     Other No     Unknown No       I have reviewed and agree with the history as documented.     HPI  84-year-old female past medical history of COPD, CHF, treatment for bronchitis about 1.5 weeks ago presents with fevers, productive cough that has worsened over the past 2 days.  EMS, patient with fever of 104.5.  Patient had oxygen saturations in 70s, started on CPAP.  Review of Systems   Unable to perform ROS: Acuity of condition   Constitutional:  Positive for fever.   Respiratory:  Positive for cough and shortness of breath.            Objective       ED Triage Vitals [02/25/25 0758]   Temperature Pulse Blood Pressure Respirations SpO2 Patient Position - Orthostatic VS   (!) 103.4 °F (39.7 °C) 98 111/54 (!) 24 94 % Sitting      Temp Source Heart Rate Source BP Location FiO2 (%) Pain Score    Temporal Monitor Right arm -- --      Vitals      Date and Time Temp Pulse SpO2 Resp BP Pain Score FACES Pain Rating User   02/25/25 1400 -- 71 94 % 24 107/52 -- -- LM   02/25/25 1300 -- 69 92 % 27 109/53 -- -- LM   02/25/25 1245 -- 69 92 % -- 106/55 -- -- KW    02/25/25 1230 -- 71 92 % -- 104/55 -- -- KW   02/25/25 1215 -- 72 92 % -- 99/51 -- -- KW   02/25/25 1200 -- 72 93 % -- 111/53 -- -- KW   02/25/25 1145 -- 71 94 % -- 103/53 -- -- KW   02/25/25 1136 -- 73 94 % -- 101/53 -- --    02/25/25 1115 -- 73 94 % 27 110/62 -- -- SB   02/25/25 1045 -- 74 93 % 29 106/53 -- -- LM   02/25/25 1031 -- -- 93 % -- -- -- -- CW   02/25/25 1030 -- 75 92 % -- 107/55 -- -- KW   02/25/25 1000 -- 75 90 % 23 99/49 -- -- SB   02/25/25 0930 98.8 °F (37.1 °C) 71 92 % 26 -- -- -- LM   02/25/25 0922 -- 73 93 % 26 96/57 -- -- LM   02/25/25 0915 -- 73 91 % 26 84/50 -- -- LM   02/25/25 0818 -- -- 94 % -- -- -- -- CW   02/25/25 0758 103.4 °F (39.7 °C) 98 94 % 24 111/54 -- -- LF            Physical Exam  Vitals and nursing note reviewed.   Constitutional:       General: She is not in acute distress.     Appearance: She is well-developed. She is not diaphoretic.   HENT:      Head: Atraumatic.      Right Ear: External ear normal.      Left Ear: External ear normal.      Nose: Nose normal.   Eyes:      Conjunctiva/sclera: Conjunctivae normal.      Pupils: Pupils are equal, round, and reactive to light.   Neck:      Vascular: No JVD.   Cardiovascular:      Rate and Rhythm: Normal rate and regular rhythm.      Heart sounds: Normal heart sounds. No murmur heard.  Pulmonary:      Effort: Pulmonary effort is normal. No respiratory distress.      Breath sounds: Wheezing and rales present.   Abdominal:      General: Bowel sounds are normal. There is no distension.      Palpations: Abdomen is soft.      Tenderness: There is no abdominal tenderness.   Musculoskeletal:         General: Normal range of motion.      Cervical back: Normal range of motion and neck supple.   Skin:     General: Skin is warm and dry.      Capillary Refill: Capillary refill takes less than 2 seconds.   Neurological:      Mental Status: She is alert and oriented to person, place, and time.      Cranial Nerves: No cranial nerve deficit.    Psychiatric:         Behavior: Behavior normal.         Results Reviewed       Procedure Component Value Units Date/Time    Blood culture #2 [[id number]] Collected: 02/25/25 0809    Lab Status: Preliminary result Specimen: Blood from Arm, Right Updated: 02/25/25 1346     Blood Culture Received in Microbiology Lab. Culture in Progress.    Blood culture #1 [[id number]] Collected: 02/25/25 0810    Lab Status: Preliminary result Specimen: Blood from Arm, Left Updated: 02/25/25 1301     Blood Culture Received in Microbiology Lab. Culture in Progress.    Blood gas, venous [[id number]]  (Abnormal) Collected: 02/25/25 0943    Lab Status: Final result Specimen: Blood from Arm, Right Updated: 02/25/25 0947     pH, Adama 7.424     pCO2, Adama 34.5 mm Hg      pO2, Adama 61.9 mm Hg      HCO3, Adama 22.1 mmol/L      Base Excess, Adama -1.8 mmol/L      O2 Content, Adama 13.7 ml/dL      O2 HGB, VENOUS 90.2 %     Procalcitonin [[id number]]  (Abnormal) Collected: 02/25/25 0838    Lab Status: Final result Specimen: Blood from Arm, Right Updated: 02/25/25 0907     Procalcitonin 1.42 ng/ml     B-Type Natriuretic Peptide(BNP) [[id number]]  (Abnormal) Collected: 02/25/25 0838    Lab Status: Final result Specimen: Blood from Arm, Right Updated: 02/25/25 0904      pg/mL     Lactic acid [[id number]]  (Normal) Collected: 02/25/25 0838    Lab Status: Final result Specimen: Blood from Arm, Right Updated: 02/25/25 0903     LACTIC ACID 2.0 mmol/L     Narrative:      Result may be elevated if tourniquet was used during collection.    Comprehensive metabolic panel [[id number]]  (Abnormal) Collected: 02/25/25 0838    Lab Status: Final result Specimen: Blood from Arm, Right Updated: 02/25/25 0857     Sodium 137 mmol/L      Potassium 4.2 mmol/L      Chloride 104 mmol/L      CO2 24 mmol/L      ANION GAP 9 mmol/L      BUN 27 mg/dL      Creatinine 1.01 mg/dL      Glucose 135 mg/dL      Calcium 9.2 mg/dL      AST 40 U/L      ALT 21 U/L      Alkaline Phosphatase 89  U/L      Total Protein 7.0 g/dL      Albumin 3.5 g/dL      Total Bilirubin 0.37 mg/dL      eGFR 50 ml/min/1.73sq m     Narrative:      [employer] Kid[employer] guidelines for Chronic Kidney Disease (CKD):     Stage 1 with normal or high GFR (GFR > 90 mL/min/1.73 square meters)    Stage 2 Mild CKD (GFR = 60-89 mL/min/1.73 square meters)    Stage 3A Moderate CKD (GFR = 45-59 mL/min/1.73 square meters)    Stage 3B Moderate CKD (GFR = 30-44 mL/min/1.73 square meters)    Stage 4 Severe CKD (GFR = 15-29 mL/min/1.73 square meters)    Stage 5 End Stage CKD (GFR <15 mL/min/1.73 square meters)  Note: GFR calculation is accurate only with a steady state creatinine    CBC and differential [[id number]]  (Abnormal) Collected: 02/25/25 0809    Lab Status: Final result Specimen: Blood from Arm, Right Updated: 02/25/25 0842     WBC 15.61 Thousand/uL      RBC 4.76 Million/uL      Hemoglobin 12.1 g/dL      Hematocrit 40.2 %      MCV 85 fL      MCH 25.4 pg      MCHC 30.1 g/dL      RDW 20.9 %      MPV 9.2 fL      Platelets 298 Thousands/uL      nRBC 0 /100 WBCs      Segmented % 93 %      Immature Grans % 0 %      Lymphocytes % 3 %      Monocytes % 4 %      Eosinophils Relative 0 %      Basophils Relative 0 %      Absolute Neutrophils 14.30 Thousands/µL      Absolute Immature Grans 0.07 Thousand/uL      Absolute Lymphocytes 0.53 Thousands/µL      Absolute Monocytes 0.68 Thousand/µL      Eosinophils Absolute 0.00 Thousand/µL      Basophils Absolute 0.03 Thousands/µL     Narrative:      This is an appended report.  These results have been appended to a previously verified report.    Protime-INR [[id number]]  (Abnormal) Collected: 02/25/25 0809    Lab Status: Final result Specimen: Blood from Arm, Right Updated: 02/25/25 0836     Protime 22.3 seconds      INR 1.88    Narrative:      INR Therapeutic Range    Indication                                             INR Range      Atrial Fibrillation                                                2.0-3.0  Hypercoagulable State                                    2.0.2.3  Left Ventricular Asist Device                            2.0-3.0  Mechanical Heart Valve                                  -    Aortic(with afib, MI, embolism, HF, LA enlargement,    and/or coagulopathy)                                     2.0-3.0 (2.5-3.5)     Mitral                                                             2.5-3.5  Prosthetic/Bioprosthetic Heart Valve               2.0-3.0  Venous thromboembolism (VTE: VT, PE        2.0-3.0    APTT [[id number]]  (Abnormal) Collected: 02/25/25 0809    Lab Status: Final result Specimen: Blood from Arm, Right Updated: 02/25/25 0836     PTT 46 seconds     FLU/COVID Rapid Antigen (30 min. TAT) - Preferred screening test in ED [[id number]]  (Abnormal) Collected: 02/25/25 0809    Lab Status: Final result Specimen: Nares from Nose Updated: 02/25/25 0836     SARS COV Rapid Antigen Negative     Influenza A Rapid Antigen Positive     Influenza B Rapid Antigen Negative    Narrative:      This test has been performed using the Peer[employer] Betzaida 2 FLU+SARS Antigen test under the Emergency Use Authorization (EUA). This test has been validated by the  and verified by the performing laboratory. The Betzaida uses lateral flow immunofluorescent sandwich assay to detect SARS-COV, Influenza A and Influenza B Antigen.     The Quidel Betzaida 2 SARS Antigen test does not differentiate between SARS-CoV and SARS-CoV-2.     Negative results are presumptive and may be confirmed with a molecular assay, if necessary, for patient management. Negative results do not rule out SARS-CoV-2 or influenza infection and should not be used as the sole basis for treatment or patient management decisions. A negative test result may occur if the level of antigen in a sample is below the limit of detection of this test.     Positive results are indicative of the presence of viral antigens, but do not rule out bacterial  infection or co-infection with other viruses.     All test results should be used as an adjunct to clinical observations and other information available to the provider.    FOR PEDIATRIC PATIENTS - copy/paste COVID Guidelines URL to browser: [email or web address]    UA w Reflex to Microscopic w Reflex to Culture [[id number]]     Lab Status: No result Specimen: Urine, Straight Cath             XR chest portable   Final Interpretation by Eliane Chang MD (02/25 0934)      Technically limited study.   Suspected airspace opacity at the left lung base with possible small left pleural effusion. Recommend PA and lateral views in full inspiration for better assessment.            Workstation performed: [id number]             ECG 12 Lead Documentation Only    Date/Time: 2/25/2025 8:31 AM    Performed by: Deanna Gilbert MD  Authorized by: Deanna Gilbert MD    Comments:      NSR rate of 96 LAD no acute ST elevations or depressions      ED Medication and Procedure Management   Prior to Admission Medications   Prescriptions Last Dose Informant Patient Reported? Taking?   Blood Glucose Monitoring Suppl (OneTouch Verio Reflect) w/Device KIT  Self No No   Sig: Check blood sugars once daily. Please substitute with appropriate alternative as covered by patient's insurance. Dx: E11.65   Calcium Carb-Cholecalciferol (CALCIUM 600 + D PO)  Self Yes No   Sig: Take 1 tablet by mouth 2 (two) times a day   Cranberry 250 MG TABS  Self Yes No   Sig: Take by mouth   Lancets (onetouch ultrasoft) lancets  Self No No   Sig: Use in the morning Use as instructed   OneTouch Delica Lancets 33G MISC  Self No No   Sig: Check blood sugars once daily. Please substitute with appropriate alternative as covered by patient's insurance. Dx: E11.65   OneTouch Verio test strip   No No   Sig: USE 1 EACH IN THE MORNING USE AS INSTRUCTED   acetaminophen (TYLENOL) 500 mg tablet  Self Yes No   Sig: Take 500 mg by  mouth every 6 (six) hours as needed   aspirin (ECOTRIN LOW STRENGTH) 81 mg EC tablet  Self No No   Sig: Take 1 tablet (81 mg total) by mouth daily   b complex vitamins capsule  Self Yes No   Sig: Take 1 capsule by mouth 2 (two) times a day     ferrous sulfate 324 (65 Fe) mg   No No   Sig: Take 1 tablet (324 mg total) by mouth daily before breakfast Take 1 tablet every other day.   fluticasone (FLONASE) 50 mcg/act nasal spray  Self No No   Si spray into each nostril daily   furosemide (LASIX) 40 mg tablet   No No   Sig: TAKE 1 TABLET BY MOUTH EVERY DAY   glucose blood (OneTouch Verio) test strip  Self No No   Sig: Check blood sugars once daily. Please substitute with appropriate alternative as covered by patient's insurance. Dx: E11.65   levothyroxine 50 mcg tablet   No No   Sig: TAKE 1 TABLET BY MOUTH EVERY DAY   omeprazole (PriLOSEC) 40 MG capsule   No No   Sig: TAKE 1 CAPSULE BY MOUTH TWICE A DAY   oxybutynin (DITROPAN-XL) 5 mg 24 hr tablet   No No   Sig: TAKE 1 TABLET (5 MG TOTAL) BY MOUTH DAILY.   oxygen gas  Self Yes No   Sig: Inhale 2 L/min continuous. Indications: copd   pregabalin (LYRICA) 25 mg capsule   No No   Sig: Take 1 capsule (25 mg total) by mouth 2 (two) times a day   sertraline (ZOLOFT) 100 mg tablet   No No   Sig: TAKE 1 TABLET BY MOUTH EVERY DAY   simvastatin (ZOCOR) 40 mg tablet   No No   Sig: TAKE 1 TABLET BY MOUTH EVERYDAY AT BEDTIME   warfarin (Coumadin) 2.5 mg tablet   No No   Sig: Take 1 tablet (2.5mg) Mon-Sat. Take 2 tablets (5mg) on Sun or as directed      Facility-Administered Medications: None     Patient's Medications   Discharge Prescriptions    No medications on file     No discharge procedures on file.  ED SEPSIS DOCUMENTATION   Time reflects when diagnosis was documented in both MDM as applicable and the Disposition within this note       Time User Action Codes Description Comment    2025 10:57 AM Deanna Gilbert [J10.1] Influenza A     2025 10:57 AM Deanna Gilbert  [R09.02] Hypoxia     2/25/2025 10:57 AM Deanna Gilbert [J18.9] Pneumonia                  Deanna Gilbert MD  02/25/25 4717

## 2025-02-25 NOTE — ASSESSMENT & PLAN NOTE
Wt Readings from Last 3 Encounters:   02/25/25 81.3 kg (179 lb 3.7 oz)   01/31/25 79.8 kg (175 lb 14.8 oz)   01/29/25 79.8 kg (175 lb 14.8 oz)     Last echo was from 2022 LVEF 60%, grade 1 diastolic dysfunction status post TAVR no evidence of volume overload  Weight appears stable at baseline  I/O, daily weight  And diuretic in setting of sepsis for 48 hours will resume if any signs of volume overload sooner

## 2025-02-25 NOTE — PLAN OF CARE
Problem: PAIN - ADULT  Goal: Verbalizes/displays adequate comfort level or baseline comfort level  Description: Interventions:  - Encourage patient to monitor pain and request assistance  - Assess pain using appropriate pain scale  - Administer analgesics based on type and severity of pain and evaluate response  - Implement non-pharmacological measures as appropriate and evaluate response  - Consider cultural and social influences on pain and pain management  - Notify physician/advanced practitioner if interventions unsuccessful or patient reports new pain  Outcome: Progressing     Problem: INFECTION - ADULT  Goal: Absence or prevention of progression during hospitalization  Description: INTERVENTIONS:  - Assess and monitor for signs and symptoms of infection  - Monitor lab/diagnostic results  - Monitor all insertion sites, i.e. indwelling lines, tubes, and drains  - Monitor endotracheal if appropriate and nasal secretions for changes in amount and color  - [place] appropriate cooling/warming therapies per order  - Administer medications as ordered  - Instruct and encourage patient and family to use good hand hygiene technique  - Identify and instruct in appropriate isolation precautions for identified infection/condition  Outcome: Progressing     Problem: SAFETY ADULT  Goal: Patient will remain free of falls  Description: INTERVENTIONS:  - Educate patient/family on patient safety including physical limitations  - Instruct patient to call for assistance with activity   - Consult OT/PT to assist with strengthening/mobility   - Keep Call bell within reach  - Keep bed low and locked with side rails adjusted as appropriate  - Keep care items and personal belongings within reach  - Initiate and maintain comfort rounds  - Make Fall Risk Sign visible to staff  - Offer Toileting every 2 Hours, in advance of need  - Initiate/Maintain bed alarm  - Obtain necessary fall risk management equipment  - Apply yellow socks and  bracelet for high fall risk patients  - Consider moving patient to room near nurses station  Outcome: Progressing  Goal: Maintain or return to baseline ADL function  Description: INTERVENTIONS:  -  Assess patient's ability to carry out ADLs; assess patient's baseline for ADL function and identify physical deficits which impact ability to perform ADLs (bathing, care of mouth/teeth, toileting, grooming, dressing, etc.)  - Assess/evaluate cause of self-care deficits   - Assess range of motion  - Assess patient's mobility; develop plan if impaired  - Assess patient's need for assistive devices and provide as appropriate  - Encourage maximum independence but intervene and supervise when necessary  - Involve family in performance of ADLs  - Assess for home care needs following discharge   - Consider OT consult to assist with ADL evaluation and planning for discharge  - Provide patient education as appropriate  Outcome: Progressing  Goal: Maintains/Returns to pre admission functional level  Description: INTERVENTIONS:  - Perform AM-PAC 6 Click Basic Mobility/ Daily Activity assessment daily.  - Set and communicate daily mobility goal to care team and patient/family/caregiver.   - Collaborate with rehabilitation services on mobility goals if consulted  - Perform Range of Motion 3 times a day.  - Reposition patient every 3 hours.  - Dangle patient 3 times a day  - Stand patient 3 times a day  - Ambulate patient 3 times a day  - Out of bed to chair 3 times a day   - Out of bed for meals 3 times a day  - Out of bed for toileting  - Record patient progress and toleration of activity level   Outcome: Progressing     Problem: DISCHARGE PLANNING  Goal: Discharge to home or other facility with appropriate resources  Description: INTERVENTIONS:  - Identify barriers to discharge w/patient and caregiver  - Arrange for needed discharge resources and transportation as appropriate  - Identify discharge learning needs (meds, wound care,  etc.)  - Arrange for interpretive services to assist at discharge as needed  - Refer to Case Management Department for coordinating discharge planning if the patient needs post-hospital services based on physician/advanced practitioner order or complex needs related to functional status, cognitive ability, or social support system  Outcome: Progressing     Problem: Knowledge Deficit  Goal: Patient/family/caregiver demonstrates understanding of disease process, treatment plan, medications, and discharge instructions  Description: Complete learning assessment and assess knowledge base.  Interventions:  - Provide teaching at level of understanding  - Provide teaching via preferred learning methods  Outcome: Progressing

## 2025-02-26 LAB
ANION GAP SERPL CALCULATED.3IONS-SCNC: 7 MMOL/L (ref 4–13)
BUN SERPL-MCNC: 27 MG/DL (ref 5–25)
CALCIUM SERPL-MCNC: 9.4 MG/DL (ref 8.4–10.2)
CHLORIDE SERPL-SCNC: 106 MMOL/L (ref 96–108)
CO2 SERPL-SCNC: 26 MMOL/L (ref 21–32)
CREAT SERPL-MCNC: 0.74 MG/DL (ref 0.6–1.3)
ERYTHROCYTE [DISTWIDTH] IN BLOOD BY AUTOMATED COUNT: 21 % (ref 11.6–15.1)
GFR SERPL CREATININE-BSD FRML MDRD: 74 ML/MIN/1.73SQ M
GLUCOSE SERPL-MCNC: 117 MG/DL (ref 65–140)
HCT VFR BLD AUTO: 35.7 % (ref 34.8–46.1)
HGB BLD-MCNC: 10.5 G/DL (ref 11.5–15.4)
INR PPP: 2.74 (ref 0.85–1.19)
MAGNESIUM SERPL-MCNC: 1.5 MG/DL (ref 1.9–2.7)
MCH RBC QN AUTO: 24.9 PG (ref 26.8–34.3)
MCHC RBC AUTO-ENTMCNC: 29.4 G/DL (ref 31.4–37.4)
MCV RBC AUTO: 85 FL (ref 82–98)
PLATELET # BLD AUTO: 247 THOUSANDS/UL (ref 149–390)
PMV BLD AUTO: 9.6 FL (ref 8.9–12.7)
POTASSIUM SERPL-SCNC: 3.8 MMOL/L (ref 3.5–5.3)
PROCALCITONIN SERPL-MCNC: 7 NG/ML
PROTHROMBIN TIME: 29.7 SECONDS (ref 12.3–15)
RBC # BLD AUTO: 4.21 MILLION/UL (ref 3.81–5.12)
SODIUM SERPL-SCNC: 139 MMOL/L (ref 135–147)
WBC # BLD AUTO: 19.86 THOUSAND/UL (ref 4.31–10.16)

## 2025-02-26 PROCEDURE — 85027 COMPLETE CBC AUTOMATED: CPT | Performed by: NURSE PRACTITIONER

## 2025-02-26 PROCEDURE — 84145 PROCALCITONIN (PCT): CPT | Performed by: NURSE PRACTITIONER

## 2025-02-26 PROCEDURE — 85610 PROTHROMBIN TIME: CPT | Performed by: NURSE PRACTITIONER

## 2025-02-26 PROCEDURE — 94760 N-INVAS EAR/PLS OXIMETRY 1: CPT

## 2025-02-26 PROCEDURE — 99232 SBSQ HOSP IP/OBS MODERATE 35: CPT | Performed by: NURSE PRACTITIONER

## 2025-02-26 PROCEDURE — 94660 CPAP INITIATION&MGMT: CPT

## 2025-02-26 PROCEDURE — 83735 ASSAY OF MAGNESIUM: CPT | Performed by: NURSE PRACTITIONER

## 2025-02-26 PROCEDURE — 94640 AIRWAY INHALATION TREATMENT: CPT

## 2025-02-26 PROCEDURE — 80048 BASIC METABOLIC PNL TOTAL CA: CPT | Performed by: NURSE PRACTITIONER

## 2025-02-26 PROCEDURE — 94664 DEMO&/EVAL PT USE INHALER: CPT

## 2025-02-26 RX ORDER — MAGNESIUM SULFATE HEPTAHYDRATE 40 MG/ML
2 INJECTION, SOLUTION INTRAVENOUS ONCE
Status: COMPLETED | OUTPATIENT
Start: 2025-02-26 | End: 2025-02-26

## 2025-02-26 RX ORDER — BENZONATATE 100 MG/1
100 CAPSULE ORAL 3 TIMES DAILY PRN
Status: DISCONTINUED | OUTPATIENT
Start: 2025-02-26 | End: 2025-03-04 | Stop reason: HOSPADM

## 2025-02-26 RX ADMIN — ALBUTEROL SULFATE 2.5 MG: 2.5 SOLUTION RESPIRATORY (INHALATION) at 11:15

## 2025-02-26 RX ADMIN — ACETAMINOPHEN 650 MG: 325 TABLET, FILM COATED ORAL at 17:32

## 2025-02-26 RX ADMIN — FUROSEMIDE 40 MG: 40 TABLET ORAL at 08:22

## 2025-02-26 RX ADMIN — PREGABALIN 25 MG: 25 CAPSULE ORAL at 17:29

## 2025-02-26 RX ADMIN — MAGNESIUM SULFATE HEPTAHYDRATE 2 G: 40 INJECTION, SOLUTION INTRAVENOUS at 10:38

## 2025-02-26 RX ADMIN — PRAVASTATIN SODIUM 80 MG: 80 TABLET ORAL at 17:29

## 2025-02-26 RX ADMIN — WARFARIN SODIUM 5 MG: 5 TABLET ORAL at 17:29

## 2025-02-26 RX ADMIN — BENZONATATE 100 MG: 100 CAPSULE ORAL at 17:29

## 2025-02-26 RX ADMIN — ACETAMINOPHEN 650 MG: 325 TABLET, FILM COATED ORAL at 10:36

## 2025-02-26 RX ADMIN — ASPIRIN 81 MG: 81 TABLET, COATED ORAL at 08:22

## 2025-02-26 RX ADMIN — CEFEPIME 2000 MG: 2 INJECTION, POWDER, FOR SOLUTION INTRAVENOUS at 10:38

## 2025-02-26 RX ADMIN — LEVOTHYROXINE SODIUM 50 MCG: 0.05 TABLET ORAL at 05:33

## 2025-02-26 RX ADMIN — PREGABALIN 25 MG: 25 CAPSULE ORAL at 08:22

## 2025-02-26 RX ADMIN — PANTOPRAZOLE SODIUM 40 MG: 40 TABLET, DELAYED RELEASE ORAL at 05:33

## 2025-02-26 RX ADMIN — SERTRALINE HYDROCHLORIDE 100 MG: 50 TABLET ORAL at 08:22

## 2025-02-26 RX ADMIN — GUAIFENESIN 600 MG: 600 TABLET ORAL at 08:22

## 2025-02-26 RX ADMIN — GUAIFENESIN 600 MG: 600 TABLET ORAL at 17:29

## 2025-02-26 NOTE — ASSESSMENT & PLAN NOTE
Wt Readings from Last 3 Encounters:   02/25/25 81.3 kg (179 lb 3.7 oz)   01/31/25 79.8 kg (175 lb 14.8 oz)   01/29/25 79.8 kg (175 lb 14.8 oz)     Last echo was from 2022 LVEF 60%, grade 1 diastolic dysfunction status post TAVR no evidence of volume overload  Weight appears stable at baseline  I/O, daily weight  Resume diuretic

## 2025-02-26 NOTE — ASSESSMENT & PLAN NOTE
Patient meeting sepsis criteria by fever, tachycardia, tachypnea leukocytosis  Continue IV antibiotics  Blood cultures x 2 pending  No evidence currently of organ dysfunction  Respiratory protocol  CBC BMP in a.m.

## 2025-02-26 NOTE — RESPIRATORY THERAPY NOTE
02/26/25 0432   Respiratory Assessment   Assessment Type Assess only   General Appearance Sleeping   Respiratory Pattern Normal   Chest Assessment Chest expansion symmetrical   Bilateral Breath Sounds Diminished   Cough None   Resp Comments Pt remains on bipap   O2 Device V60   Non-Invasive Information   O2 Interface Device Face mask   Non-Invasive Ventilation Mode BiPAP   SpO2 97 %   $ Pulse Oximetry Spot Check Charge Completed   Non-Invasive Settings   IPAP (cm) 16 cm   EPAP (cm) 8 cm   Rate (Set) 8   FiO2 (%) 40   Pressure Support (cm H2O) 8   Rise Time 2   Non-Invasive Readings   Total Rate 18   Vt (mL) (Mech) 642   MV (Mech) 11   Peak Pressure (Obs) 17   Spontaneous Vt (mL) 583   Leak (lpm) 8   Skin Intervention Skin intact   Non-Invasive Alarms   Insp Pressure High (cm H20) 25   Insp Pressure Low (cm H20) 5   Low Insp Pressure Time (sec) 20 sec   MV Low (L/min) 2   Vt High (mL) 1200   Vt Low (mL) 200   High Resp Rate (BPM) 50 BPM   Low Resp Rate (BPM) 8 BPM     RT Ventilator Management Note  Marisol Otoole 84 y.o. female MRN: [id number]  Unit/Bed#: -01 Encounter: [id number]      Daily Screen    No data found in the last 10 encounters.           Physical Exam:   Assessment Type: Assess only  General Appearance: Sleeping  Respiratory Pattern: Normal  Chest Assessment: Chest expansion symmetrical  Bilateral Breath Sounds: Diminished  Cough: None  O2 Device: V60      Resp Comments: Pt remains on bipap

## 2025-02-26 NOTE — PLAN OF CARE
Problem: PAIN - ADULT  Goal: Verbalizes/displays adequate comfort level or baseline comfort level  Description: Interventions:  - Encourage patient to monitor pain and request assistance  - Assess pain using appropriate pain scale  - Administer analgesics based on type and severity of pain and evaluate response  - Implement non-pharmacological measures as appropriate and evaluate response  - Consider cultural and social influences on pain and pain management  - Notify physician/advanced practitioner if interventions unsuccessful or patient reports new pain  Outcome: Progressing     Problem: INFECTION - ADULT  Goal: Absence or prevention of progression during hospitalization  Description: INTERVENTIONS:  - Assess and monitor for signs and symptoms of infection  - Monitor lab/diagnostic results  - Monitor all insertion sites, i.e. indwelling lines, tubes, and drains  - Monitor endotracheal if appropriate and nasal secretions for changes in amount and color  - [place] appropriate cooling/warming therapies per order  - Administer medications as ordered  - Instruct and encourage patient and family to use good hand hygiene technique  - Identify and instruct in appropriate isolation precautions for identified infection/condition  Outcome: Progressing     Problem: SAFETY ADULT  Goal: Patient will remain free of falls  Description: INTERVENTIONS:  - Educate patient/family on patient safety including physical limitations  - Instruct patient to call for assistance with activity   - Consult OT/PT to assist with strengthening/mobility   - Keep Call bell within reach  - Keep bed low and locked with side rails adjusted as appropriate  - Keep care items and personal belongings within reach  - Initiate and maintain comfort rounds  - Make Fall Risk Sign visible to staff  - Offer Toileting every 3 Hours, in advance of need  - Initiate/Maintain bed alarm  Outcome: Progressing  Goal: Maintain or return to baseline ADL  function  Description: INTERVENTIONS:  -  Assess patient's ability to carry out ADLs; assess patient's baseline for ADL function and identify physical deficits which impact ability to perform ADLs (bathing, care of mouth/teeth, toileting, grooming, dressing, etc.)  - Assess/evaluate cause of self-care deficits   - Assess range of motion  - Assess patient's mobility; develop plan if impaired  - Assess patient's need for assistive devices and provide as appropriate  - Encourage maximum independence but intervene and supervise when necessary  - Involve family in performance of ADLs  - Assess for home care needs following discharge   - Consider OT consult to assist with ADL evaluation and planning for discharge  - Provide patient education as appropriate  Outcome: Progressing  Goal: Maintains/Returns to pre admission functional level  Description: INTERVENTIONS:  - Perform AM-PAC 6 Click Basic Mobility/ Daily Activity assessment daily.  - Set and communicate daily mobility goal to care team and patient/family/caregiver.   - Collaborate with rehabilitation services on mobility goals if consulted  - Perform Range of Motion 3 times a day.  - Reposition patient every 3 hours.  - Dangle patient 3 times a day  - Stand patient 3 times a day  - Ambulate patient 3 times a day  - Out of bed to chair 3 times a day   - Out of bed for meals 3 times a day  - Out of bed for toileting  - Record patient progress and toleration of activity level   Outcome: Progressing     Problem: DISCHARGE PLANNING  Goal: Discharge to home or other facility with appropriate resources  Description: INTERVENTIONS:  - Identify barriers to discharge w/patient and caregiver  - Arrange for needed discharge resources and transportation as appropriate  - Identify discharge learning needs (meds, wound care, etc.)  - Arrange for interpretive services to assist at discharge as needed  - Refer to Case Management Department for coordinating discharge planning if the  patient needs post-hospital services based on physician/advanced practitioner order or complex needs related to functional status, cognitive ability, or social support system  Outcome: Progressing     Problem: Knowledge Deficit  Goal: Patient/family/caregiver demonstrates understanding of disease process, treatment plan, medications, and discharge instructions  Description: Complete learning assessment and assess knowledge base.  Interventions:  - Provide teaching at level of understanding  - Provide teaching via preferred learning methods  Outcome: Progressing     Problem: Prexisting or High Potential for Compromised Skin Integrity  Goal: Skin integrity is maintained or improved  Description: INTERVENTIONS:  - Identify patients at risk for skin breakdown  - Assess and monitor skin integrity  - Assess and monitor nutrition and hydration status  - Monitor labs   - Assess for incontinence   - Turn and reposition patient  - Assist with mobility/ambulation  - Relieve pressure over bony prominences  - Avoid friction and shearing  - Provide appropriate hygiene as needed including keeping skin clean and dry  - Evaluate need for skin moisturizer/barrier cream  - Collaborate with interdisciplinary team   - Patient/family teaching  - Consider wound care consult   Outcome: Progressing

## 2025-02-26 NOTE — ASSESSMENT & PLAN NOTE
Patient is CPAP and O2 dependent at night only  Patient upon admission to be desatting in ED and 70s requiring BiPAP she was weaned to 4 L nasal cannula patient's baseline is 2 L  Continue to wean oxygen maintaining sats greater than 90%  Respiratory protocol

## 2025-02-26 NOTE — PLAN OF CARE
Problem: PAIN - ADULT  Goal: Verbalizes/displays adequate comfort level or baseline comfort level  Description: Interventions:  - Encourage patient to monitor pain and request assistance  - Assess pain using appropriate pain scale  - Administer analgesics based on type and severity of pain and evaluate response  - Implement non-pharmacological measures as appropriate and evaluate response  - Consider cultural and social influences on pain and pain management  - Notify physician/advanced practitioner if interventions unsuccessful or patient reports new pain  Outcome: Progressing     Problem: INFECTION - ADULT  Goal: Absence or prevention of progression during hospitalization  Description: INTERVENTIONS:  - Assess and monitor for signs and symptoms of infection  - Monitor lab/diagnostic results  - Monitor all insertion sites, i.e. indwelling lines, tubes, and drains  - Monitor endotracheal if appropriate and nasal secretions for changes in amount and color  - [place] appropriate cooling/warming therapies per order  - Administer medications as ordered  - Instruct and encourage patient and family to use good hand hygiene technique  - Identify and instruct in appropriate isolation precautions for identified infection/condition  Outcome: Progressing     Problem: SAFETY ADULT  Goal: Patient will remain free of falls  Description: INTERVENTIONS:  - Educate patient/family on patient safety including physical limitations  - Instruct patient to call for assistance with activity   - Consult OT/PT to assist with strengthening/mobility   - Keep Call bell within reach  - Keep bed low and locked with side rails adjusted as appropriate  - Keep care items and personal belongings within reach  - Initiate and maintain comfort rounds  - Make Fall Risk Sign visible to staff  - Offer Toileting every 2 Hours, in advance of need  - Initiate/Maintain bed alarm  - Obtain necessary fall risk management equipment  - Apply yellow socks and  bracelet for high fall risk patients  - Consider moving patient to room near nurses station  Outcome: Progressing  Goal: Maintain or return to baseline ADL function  Description: INTERVENTIONS:  -  Assess patient's ability to carry out ADLs; assess patient's baseline for ADL function and identify physical deficits which impact ability to perform ADLs (bathing, care of mouth/teeth, toileting, grooming, dressing, etc.)  - Assess/evaluate cause of self-care deficits   - Assess range of motion  - Assess patient's mobility; develop plan if impaired  - Assess patient's need for assistive devices and provide as appropriate  - Encourage maximum independence but intervene and supervise when necessary  - Involve family in performance of ADLs  - Assess for home care needs following discharge   - Consider OT consult to assist with ADL evaluation and planning for discharge  - Provide patient education as appropriate  Outcome: Progressing  Goal: Maintains/Returns to pre admission functional level  Description: INTERVENTIONS:  - Perform AM-PAC 6 Click Basic Mobility/ Daily Activity assessment daily.  - Set and communicate daily mobility goal to care team and patient/family/caregiver.   - Collaborate with rehabilitation services on mobility goals if consulted  - Perform Range of Motion 3 times a day.  - Reposition patient every 3 hours.  - Dangle patient 3 times a day  - Stand patient 3 times a day  - Ambulate patient 3 times a day  - Out of bed to chair 3 times a day   - Out of bed for meals 3 times a day  - Out of bed for toileting  - Record patient progress and toleration of activity level   Outcome: Progressing     Problem: DISCHARGE PLANNING  Goal: Discharge to home or other facility with appropriate resources  Description: INTERVENTIONS:  - Identify barriers to discharge w/patient and caregiver  - Arrange for needed discharge resources and transportation as appropriate  - Identify discharge learning needs (meds, wound care,  etc.)  - Arrange for interpretive services to assist at discharge as needed  - Refer to Case Management Department for coordinating discharge planning if the patient needs post-hospital services based on physician/advanced practitioner order or complex needs related to functional status, cognitive ability, or social support system  Outcome: Progressing     Problem: Knowledge Deficit  Goal: Patient/family/caregiver demonstrates understanding of disease process, treatment plan, medications, and discharge instructions  Description: Complete learning assessment and assess knowledge base.  Interventions:  - Provide teaching at level of understanding  - Provide teaching via preferred learning methods  Outcome: Progressing     Problem: Prexisting or High Potential for Compromised Skin Integrity  Goal: Skin integrity is maintained or improved  Description: INTERVENTIONS:  - Identify patients at risk for skin breakdown  - Assess and monitor skin integrity  - Assess and monitor nutrition and hydration status  - Monitor labs   - Assess for incontinence   - Turn and reposition patient  - Assist with mobility/ambulation  - Relieve pressure over bony prominences  - Avoid friction and shearing  - Provide appropriate hygiene as needed including keeping skin clean and dry  - Evaluate need for skin moisturizer/barrier cream  - Collaborate with interdisciplinary team   - Patient/family teaching  - Consider wound care consult   Outcome: Progressing

## 2025-02-26 NOTE — PROGRESS NOTES
Progress Note - Hospitalist   Name: Marisol Otoole 84 y.o. female I MRN: [id number]  Unit/Bed#: -01 I Date of Admission: 2/25/2025   Date of Service: 2/26/2025 I Hospital Day: 1    Assessment & Plan  Pneumonia  Patient on imaging with concern for a left lower lung opacity  Patient appears more clinically ill than what x-ray is showing will obtain a CT of the chest without contrast  CT chest with evidence of bibasilar pneumonia he will air lymph nodes  Patient given IV cefepime in the ED  Strep and Legionella are negative  Patient given dose of IV ceftriaxone  Procalcitonin elevated at 1.42 repeat now 7.00  Will switch patient to cefepime check a MRSA   Continue added Tessalon Perles  Respiratory protocol  Incentive spirometry  Sepsis (HCC)  Patient meeting sepsis criteria by fever, tachycardia, tachypnea leukocytosis  Continue IV antibiotics  Blood cultures x 2 pending  No evidence currently of organ dysfunction  Respiratory protocol  CBC BMP in a.m.  Acute and chronic respiratory failure with hypoxia (HCC)  Patient is CPAP and O2 dependent at night only  Patient upon admission to be desatting in ED and 70s requiring BiPAP she was weaned to 4 L nasal cannula patient's baseline is 2 L  Continue to wean oxygen maintaining sats greater than 90%  Respiratory protocol  GERD (gastroesophageal reflux disease)  Continue PPI  Acquired hypothyroidism  Continue levothyroxine  Primary hypertension  Blood pressure soft on admission improved with IV fluids  Continue to monitor routine vitals per unit  JANICE (obstructive sleep apnea)  Continue BiPAP nightly  Morbid obesity due to excess calories (HCC)  Evident by BMI of 43  Weight loss through diet and exercise discussed  Chronic heart failure with preserved ejection fraction (HFpEF) (HCC)  Wt Readings from Last 3 Encounters:   02/25/25 81.3 kg (179 lb 3.7 oz)   01/31/25 79.8 kg (175 lb 14.8 oz)   01/29/25 79.8 kg (175 lb 14.8 oz)     Last echo was from 2022 LVEF 60%, grade 1  diastolic dysfunction status post TAVR no evidence of volume overload  Weight appears stable at baseline  I/O, daily weight  Resume diuretic        Paroxysmal atrial fibrillation (HCC)  Known history of A-fib  Currently not on beta-blocker  Patient on Coumadin for AC  Continue home Coumadin dose  Continue INR in a.m.  Influenza A  4-day history of increased shortness of breath not feeling well  Patient out of the window for Tamiflu  Supportive measures    VTE Pharmacologic Prophylaxis: VTE Score: 9 High Risk (Score >/= 5) - Pharmacological DVT Prophylaxis Ordered: warfarin (Coumadin). Sequential Compression Devices Ordered.    Mobility:   Basic Mobility Inpatient Raw Score: 18  JH-HLM Goal: 6: Walk 10 steps or more  JH-HLM Achieved: 2: Bed activities/Dependent transfer  JH-HLM Goal NOT achieved. Continue with multidisciplinary rounding and encourage appropriate mobility to improve upon JH-HLM goals.    Patient Centered Rounds: I performed bedside rounds with nursing staff today.   Discussions with Specialists or Other Care Team Provider: Case management    Education and Discussions with Family / Patient: Patient declined call to .     Current Length of Stay: 1 day(s)  Current Patient Status: Inpatient   Certification Statement: The patient will continue to require additional inpatient hospital stay due to sepsis in setting of pneumonia influenza with need for IV antibiotics and respiratory support  Discharge Plan: Anticipate discharge in 48 hrs to home.    Code Status: Level 3 - DNAR and DNI    Subjective   Patient reports cough has been persistent she feels no better today and just overall generally feels unwell.  Denies any fever.    Objective :  Temp:  [97.3 °F (36.3 °C)-97.8 °F (36.6 °C)] 97.3 °F (36.3 °C)  HR:  [60-71] 61  BP: (106-121)/(50-57) 121/57  Resp:  [17-24] 18  SpO2:  [93 %-97 %] 95 %  O2 Device: Nasal cannula  Nasal Cannula O2 Flow Rate (L/min):  [4 L/min] 4 L/min    Body mass index is  43.22 kg/m².     Input and Output Summary (last 24 hours):   No intake or output data in the 24 hours ending 02/26/25 1305    Physical Exam  Constitutional:       Appearance: She is well-developed. She is ill-appearing.   HENT:      Head: Normocephalic and atraumatic.   Eyes:      Conjunctiva/sclera: Conjunctivae normal.   Cardiovascular:      Rate and Rhythm: Normal rate and regular rhythm.      Heart sounds: Normal heart sounds.   Pulmonary:      Effort: Accessory muscle usage present.      Breath sounds: Examination of the right-lower field reveals rales. Examination of the left-lower field reveals rales. Rales present.   Abdominal:      General: Bowel sounds are normal.      Palpations: Abdomen is soft.   Musculoskeletal:         General: Normal range of motion.      Cervical back: Normal range of motion.   Skin:     General: Skin is warm and dry.   Neurological:      Mental Status: She is alert and oriented to person, place, and time.   Psychiatric:         Behavior: Behavior normal.           Lines/Drains:              Lab Results: I have reviewed the following results:   Results from last 7 days   Lab Units 02/26/25  0605 02/25/25  0809   WBC Thousand/uL 19.86* 15.61*   HEMOGLOBIN g/dL 10.5* 12.1   HEMATOCRIT % 35.7 40.2   PLATELETS Thousands/uL 247 298   SEGS PCT %  --  93*   LYMPHO PCT %  --  3*   MONO PCT %  --  4   EOS PCT %  --  0     Results from last 7 days   Lab Units 02/26/25  0605 02/25/25  0838   SODIUM mmol/L 139 137   POTASSIUM mmol/L 3.8 4.2   CHLORIDE mmol/L 106 104   CO2 mmol/L 26 24   BUN mg/dL 27* 27*   CREATININE mg/dL 0.74 1.01   ANION GAP mmol/L 7 9   CALCIUM mg/dL 9.4 9.2   ALBUMIN g/dL  --  3.5   TOTAL BILIRUBIN mg/dL  --  0.37   ALK PHOS U/L  --  89   ALT U/L  --  21   AST U/L  --  40*   GLUCOSE RANDOM mg/dL 117 135     Results from last 7 days   Lab Units 02/26/25  0605   INR  2.74*             Results from last 7 days   Lab Units 02/26/25  0605 02/25/25  0838   LACTIC ACID mmol/L  --   2.0   PROCALCITONIN ng/ml 7.00* 1.42*       Recent Cultures (last 7 days):   Results from last 7 days   Lab Units 02/25/25  0810 02/25/25  0809 02/25/25  0000   BLOOD CULTURE  No Growth at 24 hrs. No Growth at 24 hrs.  --    LEGIONELLA URINARY ANTIGEN   --   --  Negative       Imaging Results Review: I reviewed radiology reports from this admission including: CT chest.  Other Study Results Review: No additional pertinent studies reviewed.    Last 24 Hours Medication List:     Current Facility-Administered Medications:     acetaminophen (TYLENOL) tablet 650 mg, Q6H PRN    albuterol inhalation solution 2.5 mg, Q4H PRN    aspirin (ECOTRIN LOW STRENGTH) EC tablet 81 mg, Daily    benzonatate (TESSALON PERLES) capsule 100 mg, TID PRN    cefepime (MAXIPIME) 2 g/50 mL dextrose IVPB, Q24H, Last Rate: 2,000 mg (02/26/25 1038)    furosemide (LASIX) tablet 40 mg, Daily    guaiFENesin (MUCINEX) 12 hr tablet 600 mg, BID    levothyroxine tablet 50 mcg, Daily    ondansetron (ZOFRAN) injection 4 mg, Q6H PRN    pantoprazole (PROTONIX) EC tablet 40 mg, Early Morning    pravastatin (PRAVACHOL) tablet 80 mg, Daily With Dinner    pregabalin (LYRICA) capsule 25 mg, BID    sertraline (ZOLOFT) tablet 100 mg, Daily    warfarin (COUMADIN) tablet 5 mg, Daily (warfarin)    Administrative Statements   Today, Patient Was Seen By: MABEL De Anda  I have spent a total time of 45 minutes in caring for this patient on the day of the visit/encounter including Risks and benefits of tx options, Instructions for management, Documenting in the medical record, and Reviewing/placing orders in the medical record (including tests, medications, and/or procedures).    **Please Note: This note may have been constructed using a voice recognition system.**

## 2025-02-26 NOTE — ASSESSMENT & PLAN NOTE
Patient on imaging with concern for a left lower lung opacity  Patient appears more clinically ill than what x-ray is showing will obtain a CT of the chest without contrast  CT chest with evidence of bibasilar pneumonia he will air lymph nodes  Patient given IV cefepime in the ED  Strep and Legionella are negative  Patient given dose of IV ceftriaxone  Procalcitonin elevated at 1.42 repeat now 7.00  Will switch patient to cefepime check a MRSA   Continue added Tessalon Perles  Respiratory protocol  Incentive spirometry

## 2025-02-26 NOTE — ASSESSMENT & PLAN NOTE
Known history of A-fib  Currently not on beta-blocker  Patient on Coumadin for AC  Continue home Coumadin dose  Continue INR in a.m.

## 2025-02-26 NOTE — RESPIRATORY THERAPY NOTE
02/25/25 2219   Respiratory Assessment   Assessment Type Assess only   General Appearance Sleeping   Respiratory Pattern Normal   Chest Assessment Chest expansion symmetrical   Bilateral Breath Sounds Diminished   Cough None   Resp Comments Pt placed on bipap at this time   O2 Device V60   Non-Invasive Information   O2 Interface Device Face mask   Non-Invasive Ventilation Mode BiPAP   $ Intermittent NIV Yes   SpO2 95 %   $ Pulse Oximetry Spot Check Charge Completed   Non-Invasive Settings   IPAP (cm) 16 cm   EPAP (cm) 8 cm   Rate (Set) 8   FiO2 (%) 40   Pressure Support (cm H2O) 8   Rise Time 2   Non-Invasive Readings   Total Rate 22   Vt (mL) (Mech) 558   MV (Mech) 11   Peak Pressure (Obs) 17   Spontaneous Vt (mL) 537   Leak (lpm) 17   Skin Intervention Skin intact   Non-Invasive Alarms   Insp Pressure High (cm H20) 25   Insp Pressure Low (cm H20) 5   Low Insp Pressure Time (sec) 20 sec   MV Low (L/min) 2   Vt High (mL) 1200   Vt Low (mL) 200   High Resp Rate (BPM) 50 BPM   Low Resp Rate (BPM) 8 BPM     RT Ventilator Management Note  Marisol Otoole 84 y.o. female MRN: [id number]  Unit/Bed#: -01 Encounter: [id number]      Daily Screen    No data found in the last 10 encounters.           Physical Exam:   Assessment Type: Assess only  General Appearance: Sleeping  Respiratory Pattern: Normal  Chest Assessment: Chest expansion symmetrical  Bilateral Breath Sounds: Diminished  Cough: None  O2 Device: V60      Resp Comments: Pt placed on bipap at this time

## 2025-02-26 NOTE — RESPIRATORY THERAPY NOTE
02/25/25 2219   Respiratory Protocol   Protocol Initiated? Yes   Protocol Selection Respiratory   Language Barrier? No   Medical & Social History Reviewed? Yes   Diagnostic Studies Reviewed? Yes   Physical Assessment Performed? Yes   Home Devices/Therapy Home O2;BiPAP/CPAP   Respiratory Plan Mild Distress pathway   Respiratory Assessment   Assessment Type Assess only   General Appearance Sleeping   Respiratory Pattern Normal   Chest Assessment Chest expansion symmetrical   Bilateral Breath Sounds Diminished   Cough None   Resp Comments Continue with current therapy   O2 Device V60   Additional Assessments   Pulse 60   Respirations 21   SpO2 95 %     RT Ventilator Management Note  Marisol Otoole 84 y.o. female MRN: [id number]  Unit/Bed#: -01 Encounter: [id number]      Daily Screen    No data found in the last 10 encounters.           Physical Exam:   Assessment Type: Assess only  General Appearance: Sleeping  Respiratory Pattern: Normal  Chest Assessment: Chest expansion symmetrical  Bilateral Breath Sounds: Diminished  Cough: None  O2 Device: V60      Resp Comments: Continue with current therapy

## 2025-02-26 NOTE — ASSESSMENT & PLAN NOTE
Blood pressure soft on admission improved with IV fluids  Continue to monitor routine vitals per unit

## 2025-02-26 NOTE — RESPIRATORY THERAPY NOTE
02/26/25 0822   Respiratory Protocol   Protocol Initiated? No   Protocol Selection Respiratory   Language Barrier? No   Medical & Social History Reviewed? Yes   Diagnostic Studies Reviewed? Yes   Physical Assessment Performed? Yes   Home Devices/Therapy Home O2;BiPAP/CPAP   Respiratory Plan Mild Distress pathway   Respiratory Assessment   Assessment Type Assess only   General Appearance Alert;Awake   Respiratory Pattern Normal   Chest Assessment Chest expansion symmetrical   Bilateral Breath Sounds Diminished   Resp Comments will cont prn txs as per COPD hx   O2 Device v60   Additional Assessments   Pulse 61   Respirations 18

## 2025-02-27 LAB
ANION GAP SERPL CALCULATED.3IONS-SCNC: 6 MMOL/L (ref 4–13)
BUN SERPL-MCNC: 33 MG/DL (ref 5–25)
CALCIUM SERPL-MCNC: 9.2 MG/DL (ref 8.4–10.2)
CHLORIDE SERPL-SCNC: 108 MMOL/L (ref 96–108)
CO2 SERPL-SCNC: 27 MMOL/L (ref 21–32)
CREAT SERPL-MCNC: 0.89 MG/DL (ref 0.6–1.3)
ERYTHROCYTE [DISTWIDTH] IN BLOOD BY AUTOMATED COUNT: 20.8 % (ref 11.6–15.1)
GFR SERPL CREATININE-BSD FRML MDRD: 59 ML/MIN/1.73SQ M
GLUCOSE SERPL-MCNC: 93 MG/DL (ref 65–140)
HCT VFR BLD AUTO: 35.1 % (ref 34.8–46.1)
HGB BLD-MCNC: 10.5 G/DL (ref 11.5–15.4)
INR PPP: 3.31 (ref 0.85–1.19)
MAGNESIUM SERPL-MCNC: 2 MG/DL (ref 1.9–2.7)
MCH RBC QN AUTO: 25.1 PG (ref 26.8–34.3)
MCHC RBC AUTO-ENTMCNC: 29.9 G/DL (ref 31.4–37.4)
MCV RBC AUTO: 84 FL (ref 82–98)
PLATELET # BLD AUTO: 247 THOUSANDS/UL (ref 149–390)
PMV BLD AUTO: 9.6 FL (ref 8.9–12.7)
POTASSIUM SERPL-SCNC: 4.2 MMOL/L (ref 3.5–5.3)
PROCALCITONIN SERPL-MCNC: 6.68 NG/ML
PROTHROMBIN TIME: 34.2 SECONDS (ref 12.3–15)
RBC # BLD AUTO: 4.19 MILLION/UL (ref 3.81–5.12)
SODIUM SERPL-SCNC: 141 MMOL/L (ref 135–147)
WBC # BLD AUTO: 13 THOUSAND/UL (ref 4.31–10.16)

## 2025-02-27 PROCEDURE — 87081 CULTURE SCREEN ONLY: CPT | Performed by: NURSE PRACTITIONER

## 2025-02-27 PROCEDURE — 94664 DEMO&/EVAL PT USE INHALER: CPT

## 2025-02-27 PROCEDURE — 83735 ASSAY OF MAGNESIUM: CPT | Performed by: NURSE PRACTITIONER

## 2025-02-27 PROCEDURE — 94660 CPAP INITIATION&MGMT: CPT

## 2025-02-27 PROCEDURE — 85610 PROTHROMBIN TIME: CPT | Performed by: NURSE PRACTITIONER

## 2025-02-27 PROCEDURE — 99232 SBSQ HOSP IP/OBS MODERATE 35: CPT | Performed by: NURSE PRACTITIONER

## 2025-02-27 PROCEDURE — 80048 BASIC METABOLIC PNL TOTAL CA: CPT | Performed by: NURSE PRACTITIONER

## 2025-02-27 PROCEDURE — 84145 PROCALCITONIN (PCT): CPT | Performed by: NURSE PRACTITIONER

## 2025-02-27 PROCEDURE — 94760 N-INVAS EAR/PLS OXIMETRY 1: CPT

## 2025-02-27 PROCEDURE — 85027 COMPLETE CBC AUTOMATED: CPT | Performed by: NURSE PRACTITIONER

## 2025-02-27 RX ORDER — WARFARIN SODIUM 5 MG/1
5 TABLET ORAL
Status: DISCONTINUED | OUTPATIENT
Start: 2025-03-02 | End: 2025-03-04 | Stop reason: HOSPADM

## 2025-02-27 RX ORDER — METHYLPREDNISOLONE SODIUM SUCCINATE 40 MG/ML
40 INJECTION, POWDER, LYOPHILIZED, FOR SOLUTION INTRAMUSCULAR; INTRAVENOUS DAILY
Status: COMPLETED | OUTPATIENT
Start: 2025-02-27 | End: 2025-02-28

## 2025-02-27 RX ORDER — WARFARIN SODIUM 2.5 MG/1
2.5 TABLET ORAL
Status: DISCONTINUED | OUTPATIENT
Start: 2025-02-28 | End: 2025-03-04 | Stop reason: HOSPADM

## 2025-02-27 RX ADMIN — ACETAMINOPHEN 650 MG: 325 TABLET, FILM COATED ORAL at 23:10

## 2025-02-27 RX ADMIN — CEFTRIAXONE SODIUM 2000 MG: 10 INJECTION, POWDER, FOR SOLUTION INTRAVENOUS at 08:19

## 2025-02-27 RX ADMIN — PREGABALIN 25 MG: 25 CAPSULE ORAL at 09:18

## 2025-02-27 RX ADMIN — GUAIFENESIN 600 MG: 600 TABLET ORAL at 18:03

## 2025-02-27 RX ADMIN — METHYLPREDNISOLONE SODIUM SUCCINATE 40 MG: 40 INJECTION, POWDER, FOR SOLUTION INTRAMUSCULAR; INTRAVENOUS at 12:51

## 2025-02-27 RX ADMIN — PANTOPRAZOLE SODIUM 40 MG: 40 TABLET, DELAYED RELEASE ORAL at 05:41

## 2025-02-27 RX ADMIN — LEVOTHYROXINE SODIUM 50 MCG: 0.05 TABLET ORAL at 05:41

## 2025-02-27 RX ADMIN — PREGABALIN 25 MG: 25 CAPSULE ORAL at 18:03

## 2025-02-27 RX ADMIN — SERTRALINE HYDROCHLORIDE 100 MG: 50 TABLET ORAL at 09:17

## 2025-02-27 RX ADMIN — PRAVASTATIN SODIUM 80 MG: 80 TABLET ORAL at 18:03

## 2025-02-27 RX ADMIN — ACETAMINOPHEN 650 MG: 325 TABLET, FILM COATED ORAL at 09:21

## 2025-02-27 RX ADMIN — GUAIFENESIN 600 MG: 600 TABLET ORAL at 09:18

## 2025-02-27 RX ADMIN — ACETAMINOPHEN 650 MG: 325 TABLET, FILM COATED ORAL at 18:03

## 2025-02-27 RX ADMIN — FUROSEMIDE 40 MG: 40 TABLET ORAL at 09:18

## 2025-02-27 RX ADMIN — ASPIRIN 81 MG: 81 TABLET, COATED ORAL at 09:18

## 2025-02-27 NOTE — RESPIRATORY THERAPY NOTE
RT Protocol Note  Marisol Otoole 84 y.o. female MRN: [id number]  Unit/Bed#: -01 Encounter: [id number]    Assessment    Principal Problem:    Pneumonia  Active Problems:    GERD (gastroesophageal reflux disease)    Acquired hypothyroidism    Primary hypertension    JANICE (obstructive sleep apnea)    Morbid obesity due to excess calories (HCC)    Chronic heart failure with preserved ejection fraction (HFpEF) ([facility])    Acute and chronic respiratory failure with hypoxia ([facility])    Paroxysmal atrial fibrillation (HCC)    Sepsis ([facility])    Influenza A      Home Pulmonary Medications:    Home Devices/Therapy: Home O2, BiPAP/CPAP    Past Medical History:   Diagnosis Date    Anemia 08/22/2018    Anxiety     Arthritis     AVB (atrioventricular block)     first degree    Cataract     CHF (congestive heart failure) ([facility])     COPD, mild ([facility])     Coronary artery disease     Dislocation of right shoulder joint     Frequent UTI     GERD (gastroesophageal reflux disease)     H/O: pneumonia     Heme positive stool     Hyperlipidemia     Hypertension     Hypothyroidism     Morbid obesity with BMI of 50.0-59.9, adult ([facility])     Obesity, morbid ([facility]) 08/22/2018    JANICE on CPAP     Pulmonary hypertension ([facility]) 08/22/2018    Severe aortic stenosis     Simple goiter     Skin cyst     within the armpits, right    Wears glasses      Social History     Socioeconomic History    Marital status: Single     Spouse name: None    Number of children: None    Years of education: None    Highest education level: None   Occupational History    Occupation: retired   Tobacco Use    Smoking status: Never     Passive exposure: Never    Smokeless tobacco: Never   Vaping Use    Vaping status: Never Used   Substance and Sexual Activity    Alcohol use: Not Currently    Drug use: Never    Sexual activity: Never   Other Topics Concern    None   Social History Narrative    Denied drinking coffee    Denied exercise habits    Most recent tobacco use screening:    "2018      Do you currently or have you served in the [place] [employer]:   No      Were you activated, into active duty, as a member of the [employer] or as a Reservist:   No          Social Drivers of Health     Financial Resource Strain: Low Risk  (10/24/2023)    Overall Financial Resource Strain (CARDIA)     Difficulty of Paying Living Expenses: Not hard at all   Food Insecurity: No Food Insecurity (2025)    Nursing - Inadequate Food Risk Classification     Worried About Running Out of Food in the Last Year: Never true     Ran Out of Food in the Last Year: Never true     Ran Out of Food in the Last Year: Never true   Transportation Needs: No Transportation Needs (2025)    Nursing - Transportation Risk Classification     Lack of Transportation: Not on file     Lack of Transportation: No   Physical Activity: Not on file   Stress: Not on file   Social Connections: Not on file   Intimate Partner Violence: Unknown (2025)    Nursing IPS     Feels Physically and Emotionally Safe: Not on file     Physically Hurt by Someone: Not on file     Humiliated or Emotionally Abused by Someone: Not on file     Physically Hurt by Someone: No     Hurt or Threatened by Someone: No   Housing Stability: Unknown (2025)    Nursing: Inadequate Housing Risk Classification     Has Housing: Not on file     Worried About Losing Housing: Not on file     Unable to Get Utilities: Not on file     Unable to Pay for Housing in the Last Year: No     Has Housin       Subjective         Objective    Physical Exam:   Assessment Type: Assess only  General Appearance: Awake  Respiratory Pattern: Normal  Chest Assessment: Chest expansion symmetrical  Bilateral Breath Sounds: Diminished  O2 Device: V60    Vitals:  Blood pressure 141/74, pulse 60, temperature 98.6 °F (37 °C), resp. rate 18, height 4' 6\" (1.372 m), weight 81.3 kg (179 lb 3.7 oz), SpO2 97%, not currently breastfeeding.          Imaging and other studies:     O2 " Device: V60     Plan    Respiratory Plan: Mild Distress pathway        Resp Comments: pt with hx of copd, will continue with prn albuterol

## 2025-02-27 NOTE — PROGRESS NOTES
Patient:  MAURISIO ELENA    MRN:  [id number]    Michellein Request ID:  [id number]    Level of care reserved:  Home Health Agency    Partner Reserved:  [facility], [place] [address] [phone]    Clinical needs requested:    Geography searched:  [phone]    Start of Service:    Request sent:  3:15pm EST on 2/27/2025 by Toni Blount    Partner reserved:  3:29pm EST on 2/27/2025 by Toni Blount    Choice list shared:

## 2025-02-27 NOTE — ASSESSMENT & PLAN NOTE
Patient on imaging with concern for a left lower lung opacity  Patient appears more clinically ill than what x-ray is showing will obtain a CT of the chest without contrast  CT chest with evidence of bibasilar pneumonia he will air lymph nodes  Patient given IV cefepime in the ED  Strep and Legionella are negative  Patient given dose of IV ceftriaxone  Procalcitonin elevated at 1.42 repeat 7.00>6.68  Given a one-time dose of IV cefepime yesterday will continue patient on IV Rocephin  MRSA culture pending  Patient is wheezing but appears to be upper airway will I will daily dose of Solu-Medrol x 2  Add ATC nebulizers  If no clinical improvement consider pulmonology evaluation  Continue Tessalon Perles as needed and Mucinex  Respiratory protocol  Incentive spirometry

## 2025-02-27 NOTE — PLAN OF CARE
Problem: PAIN - ADULT  Goal: Verbalizes/displays adequate comfort level or baseline comfort level  Description: Interventions:  - Encourage patient to monitor pain and request assistance  - Assess pain using appropriate pain scale  - Administer analgesics based on type and severity of pain and evaluate response  - Implement non-pharmacological measures as appropriate and evaluate response  - Consider cultural and social influences on pain and pain management  - Notify physician/advanced practitioner if interventions unsuccessful or patient reports new pain  Outcome: Progressing     Problem: INFECTION - ADULT  Goal: Absence or prevention of progression during hospitalization  Description: INTERVENTIONS:  - Assess and monitor for signs and symptoms of infection  - Monitor lab/diagnostic results  - Monitor all insertion sites, i.e. indwelling lines, tubes, and drains  - Monitor endotracheal if appropriate and nasal secretions for changes in amount and color  - [place] appropriate cooling/warming therapies per order  - Administer medications as ordered  - Instruct and encourage patient and family to use good hand hygiene technique  - Identify and instruct in appropriate isolation precautions for identified infection/condition  Outcome: Progressing     Problem: DISCHARGE PLANNING  Goal: Discharge to home or other facility with appropriate resources  Description: INTERVENTIONS:  - Identify barriers to discharge w/patient and caregiver  - Arrange for needed discharge resources and transportation as appropriate  - Identify discharge learning needs (meds, wound care, etc.)  - Arrange for interpretive services to assist at discharge as needed  - Refer to Case Management Department for coordinating discharge planning if the patient needs post-hospital services based on physician/advanced practitioner order or complex needs related to functional status, cognitive ability, or social support system  Outcome: Progressing      Problem: Knowledge Deficit  Goal: Patient/family/caregiver demonstrates understanding of disease process, treatment plan, medications, and discharge instructions  Description: Complete learning assessment and assess knowledge base.  Interventions:  - Provide teaching at level of understanding  - Provide teaching via preferred learning methods  Outcome: Progressing

## 2025-02-27 NOTE — ASSESSMENT & PLAN NOTE
Known history of A-fib  Currently not on beta-blocker  Patient on Coumadin for AC  Continue home Coumadin dose  INR in a.m.

## 2025-02-27 NOTE — PROGRESS NOTES
Progress Note - Hospitalist   Name: Marisol Otoole 84 y.o. female I MRN: [id number]  Unit/Bed#: -01 I Date of Admission: 2/25/2025   Date of Service: 2/27/2025 I Hospital Day: 2    Assessment & Plan  Pneumonia  Patient on imaging with concern for a left lower lung opacity  Patient appears more clinically ill than what x-ray is showing will obtain a CT of the chest without contrast  CT chest with evidence of bibasilar pneumonia he will air lymph nodes  Patient given IV cefepime in the ED  Strep and Legionella are negative  Patient given dose of IV ceftriaxone  Procalcitonin elevated at 1.42 repeat 7.00>6.68  Given a one-time dose of IV cefepime yesterday will continue patient on IV Rocephin  MRSA culture pending  Patient is wheezing but appears to be upper airway will I will daily dose of Solu-Medrol x 2  Add ATC nebulizers  If no clinical improvement consider pulmonology evaluation  Continue Tessalon Perles as needed and Mucinex  Respiratory protocol  Incentive spirometry  Sepsis (HCC)  Patient meeting sepsis criteria by fever, tachycardia, tachypnea leukocytosis  Continue IV antibiotics  Blood cultures x 2 no growth at 24 hours  No evidence of organ dysfunction  Respiratory protocol  CBC BMP in a.m.  Acute and chronic respiratory failure with hypoxia (HCC)  Patient is CPAP and O2 dependent at night only  Patient upon admission to be desatting in ED and 70s requiring BiPAP she was weaned to 4 L nasal cannula patient's baseline is 2 L  Continue to wean oxygen maintaining sats greater than 90%  Respiratory protocol  GERD (gastroesophageal reflux disease)  Continue PPI  Acquired hypothyroidism  Continue levothyroxine  Primary hypertension  Blood pressure soft on admission improved with IV fluids  Continue to monitor routine vitals per unit  JANICE (obstructive sleep apnea)  Continue BiPAP nightly  Morbid obesity due to excess calories (HCC)  Evident by BMI of 43  Weight loss through diet and exercise  discussed  Chronic heart failure with preserved ejection fraction (HFpEF) ([facility])  Wt Readings from Last 3 Encounters:   02/25/25 81.3 kg (179 lb 3.7 oz)   01/31/25 79.8 kg (175 lb 14.8 oz)   01/29/25 79.8 kg (175 lb 14.8 oz)     Last echo was from 2022 LVEF 60%, grade 1 diastolic dysfunction status post TAVR no evidence of volume overload  Weight appears stable at baseline  I/O, daily weight  Resume diuretic currently resume daily patient takes every other day        Paroxysmal atrial fibrillation (HCC)  Known history of A-fib  Currently not on beta-blocker  Patient on Coumadin for AC  Continue home Coumadin dose  INR in a.m.  Influenza A  4-day history of increased shortness of breath not feeling well  Patient out of the window for Tamiflu  Supportive measures    VTE Pharmacologic Prophylaxis: VTE Score: 9 High Risk (Score >/= 5) - Pharmacological DVT Prophylaxis Ordered: warfarin (Coumadin). Sequential Compression Devices Ordered.    Mobility:   Basic Mobility Inpatient Raw Score: 18  JH-HLM Goal: 6: Walk 10 steps or more  JH-HLM Achieved: 2: Bed activities/Dependent transfer  JH-HLM Goal NOT achieved. Continue with multidisciplinary rounding and encourage appropriate mobility to improve upon JH-HLM goals.    Patient Centered Rounds: I performed bedside rounds with nursing staff today.   Discussions with Specialists or Other Care Team Provider: case Management    Education and Discussions with Family / Patient: Patient declined call to .     Current Length of Stay: 2 day(s)  Current Patient Status: Inpatient   Certification Statement: The patient will continue to require additional inpatient hospital stay due to treatment in setting of acute on chronic respiratory failure sepsis influenza pneumonia need for IV antibiotics steroids and acute monitoring  Discharge Plan: Anticipate discharge in 48 hrs to TBD    Code Status: Level 3 - DNAR and DNI    Subjective   Patient reports feels slightly better but  had a very rough night and had difficulty breathing felt better once she was placed on the BiPAP.  Patient reports she is he is feeling this tight wheezing feeling.    Objective :  Temp:  [97.9 °F (36.6 °C)-98.8 °F (37.1 °C)] 98.6 °F (37 °C)  HR:  [] 60  BP: ()/(58-74) 141/74  SpO2:  [95 %-98 %] 97 %  O2 Device: Nasal cannula  Nasal Cannula O2 Flow Rate (L/min):  [4 L/min] 4 L/min    Body mass index is 43.22 kg/m².     Input and Output Summary (last 24 hours):     Intake/Output Summary (Last 24 hours) at 2/27/2025 1148  Last data filed at 2/27/2025 0850  Gross per 24 hour   Intake 180 ml   Output --   Net 180 ml       Physical Exam  Vitals and nursing note reviewed.   Constitutional:       General: She is not in acute distress.     Appearance: She is well-developed. She is obese. She is ill-appearing and diaphoretic.   HENT:      Head: Normocephalic and atraumatic.   Eyes:      Conjunctiva/sclera: Conjunctivae normal.   Cardiovascular:      Rate and Rhythm: Normal rate and regular rhythm.      Heart sounds: No murmur heard.  Pulmonary:      Effort: Accessory muscle usage present. No respiratory distress.      Breath sounds: Transmitted upper airway sounds present. Examination of the right-upper field reveals rhonchi. Examination of the left-upper field reveals rhonchi. Examination of the right-middle field reveals rhonchi. Examination of the right-lower field reveals decreased breath sounds and rhonchi. Examination of the left-lower field reveals decreased breath sounds and rhonchi. Decreased breath sounds and rhonchi present. No wheezing.      Comments: Wheezing noted in upper airway none auscultated and lungs  Abdominal:      Palpations: Abdomen is soft.      Tenderness: There is no abdominal tenderness.   Musculoskeletal:         General: No swelling.      Cervical back: Neck supple.   Skin:     General: Skin is warm.      Capillary Refill: Capillary refill takes less than 2 seconds.   Neurological:       Mental Status: She is alert and oriented to person, place, and time.   Psychiatric:         Mood and Affect: Mood normal.           Lines/Drains:              Lab Results: I have reviewed the following results:   Results from last 7 days   Lab Units 02/27/25  0501 02/26/25  0605 02/25/25  0809   WBC Thousand/uL 13.00*   < > 15.61*   HEMOGLOBIN g/dL 10.5*   < > 12.1   HEMATOCRIT % 35.1   < > 40.2   PLATELETS Thousands/uL 247   < > 298   SEGS PCT %  --   --  93*   LYMPHO PCT %  --   --  3*   MONO PCT %  --   --  4   EOS PCT %  --   --  0    < > = values in this interval not displayed.     Results from last 7 days   Lab Units 02/27/25  0501 02/26/25  0605 02/25/25  0838   SODIUM mmol/L 141   < > 137   POTASSIUM mmol/L 4.2   < > 4.2   CHLORIDE mmol/L 108   < > 104   CO2 mmol/L 27   < > 24   BUN mg/dL 33*   < > 27*   CREATININE mg/dL 0.89   < > 1.01   ANION GAP mmol/L 6   < > 9   CALCIUM mg/dL 9.2   < > 9.2   ALBUMIN g/dL  --   --  3.5   TOTAL BILIRUBIN mg/dL  --   --  0.37   ALK PHOS U/L  --   --  89   ALT U/L  --   --  21   AST U/L  --   --  40*   GLUCOSE RANDOM mg/dL 93   < > 135    < > = values in this interval not displayed.     Results from last 7 days   Lab Units 02/27/25  0850   INR  3.31*             Results from last 7 days   Lab Units 02/27/25  0501 02/26/25  0605 02/25/25  0838   LACTIC ACID mmol/L  --   --  2.0   PROCALCITONIN ng/ml 6.68* 7.00* 1.42*       Recent Cultures (last 7 days):   Results from last 7 days   Lab Units 02/25/25  0810 02/25/25  0809 02/25/25  0000   BLOOD CULTURE  No Growth at 24 hrs. No Growth at 24 hrs.  --    LEGIONELLA URINARY ANTIGEN   --   --  Negative       Imaging Results Review: No pertinent imaging studies reviewed.  Other Study Results Review: No additional pertinent studies reviewed.    Last 24 Hours Medication List:     Current Facility-Administered Medications:     acetaminophen (TYLENOL) tablet 650 mg, Q6H PRN    albuterol inhalation solution 2.5 mg, Q4H PRN     aspirin (ECOTRIN LOW STRENGTH) EC tablet 81 mg, Daily    benzonatate (TESSALON PERLES) capsule 100 mg, TID PRN    cefTRIAXone (ROCEPHIN) 2,000 mg in dextrose 5 % 50 mL IVPB, Q24H, Last Rate: 2,000 mg (02/27/25 0819)    furosemide (LASIX) tablet 40 mg, Daily    guaiFENesin (MUCINEX) 12 hr tablet 600 mg, BID    ipratropium (ATROVENT) 0.02 % inhalation solution 0.5 mg, 4x Daily    levothyroxine tablet 50 mcg, Daily    methylPREDNISolone sodium succinate (Solu-MEDROL) injection 40 mg, Daily    ondansetron (ZOFRAN) injection 4 mg, Q6H PRN    pantoprazole (PROTONIX) EC tablet 40 mg, Early Morning    pravastatin (PRAVACHOL) tablet 80 mg, Daily With Dinner    pregabalin (LYRICA) capsule 25 mg, BID    sertraline (ZOLOFT) tablet 100 mg, Daily    [START ON 2/28/2025] warfarin (COUMADIN) tablet 2.5 mg, Once per day on Monday Tuesday Wednesday Thursday Friday Saturday    [START ON 3/2/2025] warfarin (COUMADIN) tablet 5 mg, Q7 Days    Administrative Statements   Today, Patient Was Seen By: MABEL De Anda  I have spent a total time of 45 minutes in caring for this patient on the day of the visit/encounter including Risks and benefits of tx options, Instructions for management, Counseling / Coordination of care, Documenting in the medical record, and Reviewing/placing orders in the medical record (including tests, medications, and/or procedures).    **Please Note: This note may have been constructed using a voice recognition system.**

## 2025-02-27 NOTE — RESPIRATORY THERAPY NOTE
02/26/25 1383   Respiratory Assessment   Assessment Type Assess only   General Appearance Sleeping   Respiratory Pattern Normal   Chest Assessment Chest expansion symmetrical   Bilateral Breath Sounds Diminished   Resp Comments patient found on BiPAP at this time   O2 Device V60   Non-Invasive Information   O2 Interface Device Face mask   Non-Invasive Ventilation Mode BiPAP   $ Intermittent NIV Yes   $ Pulse Oximetry Spot Check Charge Completed   Non-Invasive Settings   IPAP (cm) 16 cm   EPAP (cm) 8 cm   Rate (Set) 8   FiO2 (%) 40   Pressure Support (cm H2O) 8   Rise Time 2   Inspiratory Time (Set) 1   Non-Invasive Readings   Total Rate 28   MV (Mech) 10.3   Peak Pressure (Obs) 18   Spontaneous Vt (mL) 367   Leak (lpm) 25   Skin Intervention Skin intact   Non-Invasive Alarms   Insp Pressure High (cm H20) 25   Insp Pressure Low (cm H20) 5   Low Insp Pressure Time (sec) 20 sec   MV Low (L/min) 2   Vt High (mL) 1200   Vt Low (mL) 200   High Resp Rate (BPM) 50 BPM   Low Resp Rate (BPM) 8 BPM   Apnea Interval (sec) 0

## 2025-02-27 NOTE — ASSESSMENT & PLAN NOTE
Patient meeting sepsis criteria by fever, tachycardia, tachypnea leukocytosis  Continue IV antibiotics  Blood cultures x 2 no growth at 24 hours  No evidence of organ dysfunction  Respiratory protocol  CBC BMP in a.m.

## 2025-02-27 NOTE — CASE MANAGEMENT
Case Management Assessment & Discharge Planning Note    Patient name Marisol Otoole  Location /-01 MRN [id number]  : 1940 Date 2025       Current Admission Date: 2025  Current Admission Diagnosis:Pneumonia   Patient Active Problem List    Diagnosis Date Noted Date Diagnosed    Sepsis ([facility]) 2025     Influenza A 2025     Epistaxis 2025     SSS (sick sinus syndrome) ([facility]) 2024     Cardiac pacemaker 2024     Non-rheumatic aortic stenosis 2024     Nonrheumatic tricuspid valve regurgitation 2024     Presence of permanent cardiac pacemaker 2024     Paroxysmal atrial fibrillation ([facility]) 2024     Junctional bradycardia 2024     Anticoagulant long-term use 2024     Urinary frequency 2024     At risk for injury related to fall 2024     Walker as ambulation aid 2024     Ambulatory dysfunction 10/24/2023     Fall 2023     Leucocytosis 2023     Orbital mass 2023     Radiculopathy, lumbar region 07/10/2021     Acute and chronic respiratory failure with hypoxia ([facility]) 06/10/2021     Depression, recurrent ([facility]) 2021     Osteopenia 10/22/2020     Insomnia 2020     History of transcatheter aortic valve replacement (TAVR) 10/09/2018     Primary osteoarthritis of left shoulder      AVB (atrioventricular block)      Chronic heart failure with preserved ejection fraction (HFpEF) ([facility])      COPD, mild ([facility])      Coronary artery disease involving native coronary artery of native heart without angina pectoris      JANICE on CPAP      Gastroesophageal reflux disease without esophagitis 2018     Iron deficiency anemia secondary to inadequate dietary iron intake 2018     Morbid obesity due to excess calories ([facility]) 2018     JANICE (obstructive sleep apnea) 2018     Hyperlipidemia 2017     Primary hypertension 2017     Pneumonia 2017     GERD (gastroesophageal reflux  disease) 10/29/2017     Acquired hypothyroidism 10/29/2017     LVH (left ventricular hypertrophy) 09/22/2016     Mitral annular calcification 09/22/2016     Mitral valve stenosis 09/22/2016     Pulmonary hypertension (HCC) 09/22/2016       LOS (days): 2  Geometric Mean LOS (GMLOS) (days): 4.9  Days to GMLOS:2.7     OBJECTIVE:  PATIENT READMITTED TO HOSPITAL  Risk of Unplanned Readmission Score: 26.32        Current admission status: Inpatient     Preferred Pharmacy:   [id number]  [place]  [address]  [place] [address]  Phone: [phone] Fax: [phone]    Primary Care Provider: MABEL Hallman    Primary Insurance: MEDICARE  Secondary Insurance:     ASSESSMENT:  Active Health Care Proxies       Yuridia Hurtado Health Care Representative - Friend   Primary Phone: [phone] (Mobile)                 Advance Directives  Does patient have a Health Care POA?: Yes (patient's friend Yuridia reports they completed POA paperwork years ago but no one is able to locate a copy. She and alexis both requested  POA paperwork. CM placed a copy on the chart and also emailed to Yuridia)  Does patient have Advance Directives?: No  Was patient offered paperwork?: Yes (POA/AD paperwork provided)  Primary Contact: friendYuridia     Readmission Root Cause  30 Day Readmission: Yes  During your hospital stay, did someone (provider, nurse, ) explain your care to you in a way you could understand?: Yes  Did you feel medically stable to leave the hospital?: Yes  Were you able to pay for your medication at the pharmacy?: Yes  Did you have reliable transportation to take you to your appointments?: Yes  During previous admission, was a post-acute recommendation made?: Yes  What post-acute resources were offered?: [facility]  Patient was readmitted due to: pneumonia. Unrelated readmission as previous was for nose bleed.    Patient Information  Admitted from:: Home  Mental Status: Alert  During  Assessment patient was accompanied by: Not accompanied during assessment  Assessment information provided by:: Patient  Primary Caregiver: Self  Support Systems: Friend  County of Residence: [place]  What city do you live in?: [place]  Home entry access options. Select all that apply.: Ramp  Type of Current Residence: [facility]  Living Arrangements: Lives w/ Friend ([place])    Activities of Daily Living Prior to Admission  Functional Status: Independent  Completes ADLs independently?: Yes  Ambulates independently?: Yes  Does patient use assisted devices?: Yes  Assisted Devices (DME) used: Walker, CPAP, Home Oxygen concentrator  DME Company Name (respiratory supplies): [employer]  O2 Rate(s): 2L PRN  Does patient currently own DME?: Yes  What DME does the patient currently own?: CPAP, Walker, Home Oxygen concentrator  Does the patient have a history of Short-Term Rehab?: Yes ([facility])  Does patient have a history of HHC?: Yes  Does patient currently have HHC?: Yes    Current Home Health Care  Type of Current Home Care Services: Home OT, Home PT, Nurse visit  [facility]  Current Home Health Follow-Up Provider:: PCP    Patient Information Continued  Does patient have prescription coverage?: Yes  Does patient receive dialysis treatments?: No  Does patient have a history of substance abuse?: No  Does patient have a history of Mental Health Diagnosis?: No     Means of Transportation  Means of Transport to Appts:: Friends    DISCHARGE DETAILS:    Discharge planning discussed with:: patient and friend Yuridia via phone  Freedom of Choice: Yes  Comments - Freedom of Choice: CM introduced self and role to patient.  Patient reports she is current with [facility] and would like to resume services at discharge. CM spoke with patient re: POA/Advanced directives.  She reports she thinks she filled out papers years ago but CM would have to speak with her friend, Yuridia, to  confirm. Patient identifies Yuridia as her Health Care Representative.  CM contacted family/caregiver?: Yes  Were Treatment Team discharge recommendations reviewed with patient/caregiver?: Yes  Did patient/caregiver verbalize understanding of patient care needs?: Yes  Were patient/caregiver advised of the risks associated with not following Treatment Team discharge recommendations?: Yes    Contacts  Patient Contacts: Yuridia  Relationship to Patient:: Friend  Contact Method: Phone  Phone Number: [phone]  Reason/Outcome: Continuity of Care, Referral, Discharge Planning    Requested Home Health Care         Is the patient interested in [facility] at discharge?: Yes  Home Health Discipline requested:: Nursing, Occupational Therapy, Physical Therapy  [facility] Agency [facility]  HHA External Referral Reason (only applicable if external HHA name selected): Patient has established relationship with provider  Home Health Follow-Up Provider:: PCP  Home Health Services Needed:: Evaluate Functional Status and Safety, Gait/ADL Training, Heart Failure Management, Oxygen Via Nasal Cannula, Strengthening/Theraputic Exercises to Improve Function  Oxygen LPM Ordered (if applicable based on home health services needed):: 2 LPM  Homebound Criteria Met:: Requires the Assistance of Another Person for Safe Ambulation or to Leave the Home, Uses an Assist Device (i.e. cane, walker, etc)  Supporting Clincal Findings:: Dyspnea with Exertion, Fatigues Easliy in Short Distances, Limited Endurance, Requires Oxygen    DME Referral Provided  Referral made for DME?: No    Other Referral/Resources/Interventions Provided:  Interventions: Advanced Directives, HHC  Referral Comments: As per patient request, CM sent referral to [facility] via [employer] for [facility]MAINE for SN/PT/OT. They are able to accept and CM reserved them and placed contact info on AVS. CM spoke with patient and her friend Yuridia re: POA paperwork.  Yuridia reports that neither she nor  Marisol can locate paperwork that was completed years ago and they were just talking about redoing this. CM explained to both patient and Yuridia how to complete POA/AD paperwork and placed resource on patient's chart and emailed copy to Yuridia at [email or web address].     Treatment Team Recommendation: Home with Home Health Care  Discharge Destination Plan:: Home with Home Health Care  Transport at Discharge : Other (Comment) (friend [place] to transport)

## 2025-02-27 NOTE — ASSESSMENT & PLAN NOTE
Wt Readings from Last 3 Encounters:   02/25/25 81.3 kg (179 lb 3.7 oz)   01/31/25 79.8 kg (175 lb 14.8 oz)   01/29/25 79.8 kg (175 lb 14.8 oz)     Last echo was from 2022 LVEF 60%, grade 1 diastolic dysfunction status post TAVR no evidence of volume overload  Weight appears stable at baseline  I/O, daily weight  Resume diuretic currently resume daily patient takes every other day

## 2025-02-28 LAB
ANION GAP SERPL CALCULATED.3IONS-SCNC: 5 MMOL/L (ref 4–13)
BUN SERPL-MCNC: 26 MG/DL (ref 5–25)
CALCIUM SERPL-MCNC: 9.3 MG/DL (ref 8.4–10.2)
CHLORIDE SERPL-SCNC: 105 MMOL/L (ref 96–108)
CO2 SERPL-SCNC: 31 MMOL/L (ref 21–32)
CREAT SERPL-MCNC: 0.67 MG/DL (ref 0.6–1.3)
ERYTHROCYTE [DISTWIDTH] IN BLOOD BY AUTOMATED COUNT: 20.5 % (ref 11.6–15.1)
GFR SERPL CREATININE-BSD FRML MDRD: 80 ML/MIN/1.73SQ M
GLUCOSE SERPL-MCNC: 108 MG/DL (ref 65–140)
HCT VFR BLD AUTO: 35.4 % (ref 34.8–46.1)
HGB BLD-MCNC: 10.7 G/DL (ref 11.5–15.4)
INR PPP: 3.65 (ref 0.85–1.19)
MCH RBC QN AUTO: 25.1 PG (ref 26.8–34.3)
MCHC RBC AUTO-ENTMCNC: 30.2 G/DL (ref 31.4–37.4)
MCV RBC AUTO: 83 FL (ref 82–98)
MRSA NOSE QL CULT: NORMAL
PLATELET # BLD AUTO: 253 THOUSANDS/UL (ref 149–390)
PMV BLD AUTO: 9.2 FL (ref 8.9–12.7)
POTASSIUM SERPL-SCNC: 4.1 MMOL/L (ref 3.5–5.3)
PROCALCITONIN SERPL-MCNC: 2.93 NG/ML
PROTHROMBIN TIME: 36.8 SECONDS (ref 12.3–15)
RBC # BLD AUTO: 4.27 MILLION/UL (ref 3.81–5.12)
SODIUM SERPL-SCNC: 141 MMOL/L (ref 135–147)
WBC # BLD AUTO: 6.83 THOUSAND/UL (ref 4.31–10.16)

## 2025-02-28 PROCEDURE — 85610 PROTHROMBIN TIME: CPT | Performed by: NURSE PRACTITIONER

## 2025-02-28 PROCEDURE — 84145 PROCALCITONIN (PCT): CPT | Performed by: NURSE PRACTITIONER

## 2025-02-28 PROCEDURE — 85027 COMPLETE CBC AUTOMATED: CPT | Performed by: NURSE PRACTITIONER

## 2025-02-28 PROCEDURE — 94760 N-INVAS EAR/PLS OXIMETRY 1: CPT

## 2025-02-28 PROCEDURE — 94660 CPAP INITIATION&MGMT: CPT

## 2025-02-28 PROCEDURE — 99232 SBSQ HOSP IP/OBS MODERATE 35: CPT

## 2025-02-28 PROCEDURE — 80048 BASIC METABOLIC PNL TOTAL CA: CPT | Performed by: NURSE PRACTITIONER

## 2025-02-28 RX ADMIN — PANTOPRAZOLE SODIUM 40 MG: 40 TABLET, DELAYED RELEASE ORAL at 05:45

## 2025-02-28 RX ADMIN — WARFARIN SODIUM 2.5 MG: 2.5 TABLET ORAL at 17:52

## 2025-02-28 RX ADMIN — PRAVASTATIN SODIUM 80 MG: 80 TABLET ORAL at 16:13

## 2025-02-28 RX ADMIN — ACETAMINOPHEN 650 MG: 325 TABLET, FILM COATED ORAL at 17:52

## 2025-02-28 RX ADMIN — PREGABALIN 25 MG: 25 CAPSULE ORAL at 17:52

## 2025-02-28 RX ADMIN — PREGABALIN 25 MG: 25 CAPSULE ORAL at 10:06

## 2025-02-28 RX ADMIN — GUAIFENESIN 600 MG: 600 TABLET ORAL at 10:06

## 2025-02-28 RX ADMIN — ASPIRIN 81 MG: 81 TABLET, COATED ORAL at 10:06

## 2025-02-28 RX ADMIN — FUROSEMIDE 40 MG: 40 TABLET ORAL at 10:07

## 2025-02-28 RX ADMIN — GUAIFENESIN 600 MG: 600 TABLET ORAL at 17:52

## 2025-02-28 RX ADMIN — LEVOTHYROXINE SODIUM 50 MCG: 0.05 TABLET ORAL at 05:45

## 2025-02-28 RX ADMIN — ACETAMINOPHEN 650 MG: 325 TABLET, FILM COATED ORAL at 10:06

## 2025-02-28 RX ADMIN — CEFTRIAXONE SODIUM 2000 MG: 10 INJECTION, POWDER, FOR SOLUTION INTRAVENOUS at 10:06

## 2025-02-28 RX ADMIN — METHYLPREDNISOLONE SODIUM SUCCINATE 40 MG: 40 INJECTION, POWDER, FOR SOLUTION INTRAMUSCULAR; INTRAVENOUS at 10:07

## 2025-02-28 RX ADMIN — SERTRALINE HYDROCHLORIDE 100 MG: 50 TABLET ORAL at 10:07

## 2025-02-28 NOTE — RESPIRATORY THERAPY NOTE
RT Protocol Note  Marisol Otoole 84 y.o. female MRN: [id number]  Unit/Bed#: -01 Encounter: [id number]    Assessment    Principal Problem:    Pneumonia  Active Problems:    GERD (gastroesophageal reflux disease)    Acquired hypothyroidism    Primary hypertension    JANICE (obstructive sleep apnea)    Morbid obesity due to excess calories (HCC)    Chronic heart failure with preserved ejection fraction (HFpEF) ([facility])    Acute and chronic respiratory failure with hypoxia ([facility])    Paroxysmal atrial fibrillation (HCC)    Sepsis ([facility])    Influenza A      Home Pulmonary Medications:    Home Devices/Therapy: Home O2, BiPAP/CPAP    Past Medical History:   Diagnosis Date    Anemia 08/22/2018    Anxiety     Arthritis     AVB (atrioventricular block)     first degree    Cataract     CHF (congestive heart failure) ([facility])     COPD, mild ([facility])     Coronary artery disease     Dislocation of right shoulder joint     Frequent UTI     GERD (gastroesophageal reflux disease)     H/O: pneumonia     Heme positive stool     Hyperlipidemia     Hypertension     Hypothyroidism     Morbid obesity with BMI of 50.0-59.9, adult ([facility])     Obesity, morbid ([facility]) 08/22/2018    JANICE on CPAP     Pulmonary hypertension ([facility]) 08/22/2018    Severe aortic stenosis     Simple goiter     Skin cyst     within the armpits, right    Wears glasses      Social History     Socioeconomic History    Marital status: Single     Spouse name: None    Number of children: None    Years of education: None    Highest education level: None   Occupational History    Occupation: retired   Tobacco Use    Smoking status: Never     Passive exposure: Never    Smokeless tobacco: Never   Vaping Use    Vaping status: Never Used   Substance and Sexual Activity    Alcohol use: Not Currently    Drug use: Never    Sexual activity: Never   Other Topics Concern    None   Social History Narrative    Denied drinking coffee    Denied exercise habits    Most recent tobacco use screening:    "2018      Do you currently or have you served in the [place] [employer]:   No      Were you activated, into active duty, as a member of the [employer] or as a Reservist:   No          Social Drivers of Health     Financial Resource Strain: Low Risk  (10/24/2023)    Overall Financial Resource Strain (CARDIA)     Difficulty of Paying Living Expenses: Not hard at all   Food Insecurity: No Food Insecurity (2025)    Nursing - Inadequate Food Risk Classification     Worried About Running Out of Food in the Last Year: Never true     Ran Out of Food in the Last Year: Never true     Ran Out of Food in the Last Year: Never true   Transportation Needs: No Transportation Needs (2025)    Nursing - Transportation Risk Classification     Lack of Transportation: Not on file     Lack of Transportation: No   Physical Activity: Not on file   Stress: Not on file   Social Connections: Not on file   Intimate Partner Violence: Unknown (2025)    Nursing IPS     Feels Physically and Emotionally Safe: Not on file     Physically Hurt by Someone: Not on file     Humiliated or Emotionally Abused by Someone: Not on file     Physically Hurt by Someone: No     Hurt or Threatened by Someone: No   Housing Stability: Unknown (2025)    Nursing: Inadequate Housing Risk Classification     Has Housing: Not on file     Worried About Losing Housing: Not on file     Unable to Get Utilities: Not on file     Unable to Pay for Housing in the Last Year: No     Has Housin       Subjective         Objective    Physical Exam:   Assessment Type: Assess only  General Appearance: Awake  Respiratory Pattern: Assisted  Chest Assessment: Chest expansion symmetrical  Bilateral Breath Sounds: Diminished  O2 Device: v60    Vitals:  Blood pressure 145/73, pulse 60, temperature 98.5 °F (36.9 °C), temperature source Axillary, resp. rate 14, height 4' 6\" (1.372 m), weight 81.3 kg (179 lb 3.7 oz), SpO2 94%, not currently breastfeeding.      "     Imaging and other studies:     O2 Device: v60     Plan    Respiratory Plan: Home Bronchodilator Patient pathway        Resp Comments: pt admitted for PNA, pt has history of COPD not in exacerbation will continue with PRN albuterol

## 2025-02-28 NOTE — ASSESSMENT & PLAN NOTE
Patient meeting sepsis criteria by fever, tachycardia, tachypnea leukocytosis  Continue IV antibiotics  Blood cultures x 2 no growth at 72 hours  No evidence of organ dysfunction  Respiratory protocol  CBC BMP in a.m.

## 2025-02-28 NOTE — PLAN OF CARE
Problem: PAIN - ADULT  Goal: Verbalizes/displays adequate comfort level or baseline comfort level  Description: Interventions:  - Encourage patient to monitor pain and request assistance  - Assess pain using appropriate pain scale  - Administer analgesics based on type and severity of pain and evaluate response  - Implement non-pharmacological measures as appropriate and evaluate response  - Consider cultural and social influences on pain and pain management  - Notify physician/advanced practitioner if interventions unsuccessful or patient reports new pain  Outcome: Progressing     Problem: INFECTION - ADULT  Goal: Absence or prevention of progression during hospitalization  Description: INTERVENTIONS:  - Assess and monitor for signs and symptoms of infection  - Monitor lab/diagnostic results  - Monitor all insertion sites, i.e. indwelling lines, tubes, and drains  - Monitor endotracheal if appropriate and nasal secretions for changes in amount and color  - [place] appropriate cooling/warming therapies per order  - Administer medications as ordered  - Instruct and encourage patient and family to use good hand hygiene technique  - Identify and instruct in appropriate isolation precautions for identified infection/condition  Outcome: Progressing     Problem: DISCHARGE PLANNING  Goal: Discharge to home or other facility with appropriate resources  Description: INTERVENTIONS:  - Identify barriers to discharge w/patient and caregiver  - Arrange for needed discharge resources and transportation as appropriate  - Identify discharge learning needs (meds, wound care, etc.)  - Arrange for interpretive services to assist at discharge as needed  - Refer to Case Management Department for coordinating discharge planning if the patient needs post-hospital services based on physician/advanced practitioner order or complex needs related to functional status, cognitive ability, or social support system  Outcome: Progressing      Problem: Knowledge Deficit  Goal: Patient/family/caregiver demonstrates understanding of disease process, treatment plan, medications, and discharge instructions  Description: Complete learning assessment and assess knowledge base.  Interventions:  - Provide teaching at level of understanding  - Provide teaching via preferred learning methods  Outcome: Progressing

## 2025-02-28 NOTE — ASSESSMENT & PLAN NOTE
Patient is CPAP and O2 dependent at night only  Patient upon admission to be desatting in ED and 70s requiring BiPAP she was weaned to 4 L nasal cannula patient's baseline is 2 L (although patient reports she wears oxygen intermittently, however she also reports her baseline oxygen saturation is high 80s at baseline)   Continue to wean oxygen maintaining sats greater than 90%  Respiratory protocol

## 2025-02-28 NOTE — RESPIRATORY THERAPY NOTE
RT Protocol Note  Marisol Otoole 84 y.o. female MRN: [id number]  Unit/Bed#: -01 Encounter: [id number]    Assessment    Principal Problem:    Pneumonia  Active Problems:    GERD (gastroesophageal reflux disease)    Acquired hypothyroidism    Primary hypertension    JANICE (obstructive sleep apnea)    Morbid obesity due to excess calories (HCC)    Chronic heart failure with preserved ejection fraction (HFpEF) ([facility])    Acute and chronic respiratory failure with hypoxia ([facility])    Paroxysmal atrial fibrillation (HCC)    Sepsis ([facility])    Influenza A      Home Pulmonary Medications:    Home Devices/Therapy: Home O2, BiPAP/CPAP    Past Medical History:   Diagnosis Date    Anemia 08/22/2018    Anxiety     Arthritis     AVB (atrioventricular block)     first degree    Cataract     CHF (congestive heart failure) ([facility])     COPD, mild ([facility])     Coronary artery disease     Dislocation of right shoulder joint     Frequent UTI     GERD (gastroesophageal reflux disease)     H/O: pneumonia     Heme positive stool     Hyperlipidemia     Hypertension     Hypothyroidism     Morbid obesity with BMI of 50.0-59.9, adult ([facility])     Obesity, morbid ([facility]) 08/22/2018    JANICE on CPAP     Pulmonary hypertension ([facility]) 08/22/2018    Severe aortic stenosis     Simple goiter     Skin cyst     within the armpits, right    Wears glasses      Social History     Socioeconomic History    Marital status: Single     Spouse name: None    Number of children: None    Years of education: None    Highest education level: None   Occupational History    Occupation: retired   Tobacco Use    Smoking status: Never     Passive exposure: Never    Smokeless tobacco: Never   Vaping Use    Vaping status: Never Used   Substance and Sexual Activity    Alcohol use: Not Currently    Drug use: Never    Sexual activity: Never   Other Topics Concern    None   Social History Narrative    Denied drinking coffee    Denied exercise habits    Most recent tobacco use screening:    "2018      Do you currently or have you served in the [place] [employer]:   No      Were you activated, into active duty, as a member of the [employer] or as a Reservist:   No          Social Drivers of Health     Financial Resource Strain: Low Risk  (10/24/2023)    Overall Financial Resource Strain (CARDIA)     Difficulty of Paying Living Expenses: Not hard at all   Food Insecurity: No Food Insecurity (2025)    Nursing - Inadequate Food Risk Classification     Worried About Running Out of Food in the Last Year: Never true     Ran Out of Food in the Last Year: Never true     Ran Out of Food in the Last Year: Never true   Transportation Needs: No Transportation Needs (2025)    Nursing - Transportation Risk Classification     Lack of Transportation: Not on file     Lack of Transportation: No   Physical Activity: Not on file   Stress: Not on file   Social Connections: Not on file   Intimate Partner Violence: Unknown (2025)    Nursing IPS     Feels Physically and Emotionally Safe: Not on file     Physically Hurt by Someone: Not on file     Humiliated or Emotionally Abused by Someone: Not on file     Physically Hurt by Someone: No     Hurt or Threatened by Someone: No   Housing Stability: Unknown (2025)    Nursing: Inadequate Housing Risk Classification     Has Housing: Not on file     Worried About Losing Housing: Not on file     Unable to Get Utilities: Not on file     Unable to Pay for Housing in the Last Year: No     Has Housin       Subjective         Objective    Physical Exam:   Assessment Type: Assess only  General Appearance: Awake  Respiratory Pattern: Normal  Chest Assessment: Chest expansion symmetrical  Bilateral Breath Sounds: Diminished  O2 Device: v60    Vitals:  Blood pressure 145/73, pulse 60, temperature 98.5 °F (36.9 °C), temperature source Axillary, resp. rate 14, height 4' 6\" (1.372 m), weight 81.3 kg (179 lb 3.7 oz), SpO2 94%, not currently breastfeeding.      "     Imaging and other studies:     O2 Device: v60     Plan    Respiratory Plan: Home Bronchodilator Patient pathway        Resp Comments: pt with dx of pna, hx of copd, not currenlty in exacerbation, will continue with prn albuterol

## 2025-02-28 NOTE — ASSESSMENT & PLAN NOTE
Patient on imaging with concern for a left lower lung opacity  CT chest with evidence of bibasilar pneumonia he will air lymph nodes  Patient given IV cefepime in the ED, and transition to ceftriaxone, will continue  Strep and Legionella are negative  Procalcitonin elevated at 1.42 repeat 7.00>6.68>2.93  MRSA culture pending  Continues to have wheezing and was started on IV Solu-Medrol, will continue today, will need to be reevaluated tomorrow  Add ATC nebulizers  If no clinical improvement consider pulmonology evaluation  Continue Tessalon Perles as needed and Mucinex  Respiratory protocol  Incentive spirometry

## 2025-02-28 NOTE — PROGRESS NOTES
Progress Note - Hospitalist   Name: Marisol Otoole 84 y.o. female I MRN: [id number]  Unit/Bed#: -01 I Date of Admission: 2/25/2025   Date of Service: 2/28/2025 I Hospital Day: 3    Assessment & Plan  Pneumonia  Patient on imaging with concern for a left lower lung opacity  CT chest with evidence of bibasilar pneumonia he will air lymph nodes  Patient given IV cefepime in the ED, and transition to ceftriaxone, will continue  Strep and Legionella are negative  Procalcitonin elevated at 1.42 repeat 7.00>6.68>2.93  MRSA culture pending  Continues to have wheezing and was started on IV Solu-Medrol, will continue today, will need to be reevaluated tomorrow  Add ATC nebulizers  If no clinical improvement consider pulmonology evaluation  Continue Tessalon Perles as needed and Mucinex  Respiratory protocol  Incentive spirometry  Sepsis (HCC)  Patient meeting sepsis criteria by fever, tachycardia, tachypnea leukocytosis  Continue IV antibiotics  Blood cultures x 2 no growth at 72 hours  No evidence of organ dysfunction  Respiratory protocol  CBC BMP in a.m.  Acute and chronic respiratory failure with hypoxia (HCC)  Patient is CPAP and O2 dependent at night only  Patient upon admission to be desatting in ED and 70s requiring BiPAP she was weaned to 4 L nasal cannula patient's baseline is 2 L (although patient reports she wears oxygen intermittently, however she also reports her baseline oxygen saturation is high 80s at baseline)   Continue to wean oxygen maintaining sats greater than 90%  Respiratory protocol  GERD (gastroesophageal reflux disease)  Continue PPI  Acquired hypothyroidism  Continue levothyroxine  Primary hypertension  Blood pressure soft on admission improved with IV fluids  Continue to monitor routine vitals per unit  JANICE (obstructive sleep apnea)  Continue BiPAP nightly  Morbid obesity due to excess calories (HCC)  Body mass index is 43.22 kg/m².  Recommend incorporating a more whole foods  plant-predominant diet along with decreasing consumption of red meats and processed foods  Per AHA guidelines, recommend moderate-vigorous intensity exercise for 30 minutes a day for 5 days a week or a total of 150 min/week    Chronic heart failure with preserved ejection fraction (HFpEF) (HCC)  Wt Readings from Last 3 Encounters:   02/25/25 81.3 kg (179 lb 3.7 oz)   01/31/25 79.8 kg (175 lb 14.8 oz)   01/29/25 79.8 kg (175 lb 14.8 oz)     Last echo was from 2022 LVEF 60%, grade 1 diastolic dysfunction status post TAVR no evidence of volume overload  Weight appears stable at baseline  I/O, daily weight  Resume diuretic currently resume daily patient takes every other day        Paroxysmal atrial fibrillation (HCC)  Known history of A-fib  Currently not on beta-blocker  Patient on Coumadin for AC  Continue home Coumadin dose  INR in a.m.  Influenza A  4-day history of increased shortness of breath not feeling well  Patient out of the window for Tamiflu  Supportive measures    VTE Pharmacologic Prophylaxis: VTE Score: 9 High Risk (Score >/= 5) - Pharmacological DVT Prophylaxis Ordered: warfarin (Coumadin). Sequential Compression Devices Ordered.    Mobility:   Basic Mobility Inpatient Raw Score: 18  JH-HLM Goal: 6: Walk 10 steps or more  JH-HLM Achieved: 2: Bed activities/Dependent transfer  JH-HLM Goal NOT achieved. Continue with multidisciplinary rounding and encourage appropriate mobility to improve upon JH-HLM goals.    Patient Centered Rounds: I performed bedside rounds with nursing staff today.   Discussions with Specialists or Other Care Team Provider: CM    Education and Discussions with Family / Patient: Updated  (friend) via phone.    Current Length of Stay: 3 day(s)  Current Patient Status: Inpatient   Certification Statement: The patient will continue to require additional inpatient hospital stay due to continuing IV antibiotics in setting of pneumonia  Discharge Plan: Anticipate discharge in  24-48 hrs to home with home services.    Code Status: Level 3 - DNAR and DNI    Subjective   Patient reports to still have significant shortness of breath compared to her usual, however is improving.  No longer having chills or fevers.  Currently denies any chest pain/pressure, palpitations, lightheadedness, or nausea.    Objective :  Temp:  [98.5 °F (36.9 °C)] 98.5 °F (36.9 °C)  HR:  [60] 60  BP: (145)/(73) 145/73  Resp:  [14] 14  SpO2:  [94 %-96 %] 96 %  O2 Device: Nasal cannula  Nasal Cannula O2 Flow Rate (L/min):  [5 L/min] 5 L/min    Body mass index is 43.22 kg/m².     Input and Output Summary (last 24 hours):   No intake or output data in the 24 hours ending 02/28/25 1347    Physical Exam  Vitals and nursing note reviewed.   Constitutional:       General: She is not in acute distress.     Appearance: She is obese. She is not ill-appearing, toxic-appearing or diaphoretic.   HENT:      Head: Normocephalic.      Nose: Nose normal.      Mouth/Throat:      Mouth: Mucous membranes are moist.      Pharynx: Oropharynx is clear.   Eyes:      General: No scleral icterus.     Conjunctiva/sclera: Conjunctivae normal.      Pupils: Pupils are equal, round, and reactive to light.   Cardiovascular:      Rate and Rhythm: Normal rate and regular rhythm.      Heart sounds: No murmur heard.     No friction rub. No gallop.   Pulmonary:      Effort: Pulmonary effort is normal. No respiratory distress.      Breath sounds: No stridor. Wheezing present. No rhonchi or rales.      Comments: Decreased breath sounds bilateral lower lobes  Abdominal:      General: Abdomen is flat.      Palpations: Abdomen is soft.   Musculoskeletal:         General: Normal range of motion.      Cervical back: Normal range of motion and neck supple.      Right lower leg: No edema.      Left lower leg: No edema.   Lymphadenopathy:      Cervical: No cervical adenopathy.   Skin:     General: Skin is warm.      Coloration: Skin is not jaundiced or pale.       Findings: No bruising, erythema or lesion.   Neurological:      General: No focal deficit present.      Mental Status: She is alert and oriented to person, place, and time. Mental status is at baseline.      Cranial Nerves: No cranial nerve deficit.      Motor: No weakness.   Psychiatric:         Mood and Affect: Mood normal.         Behavior: Behavior normal.         Thought Content: Thought content normal.           Lab Results: I have reviewed the following results:   Results from last 7 days   Lab Units 02/28/25  0711 02/26/25  0605 02/25/25  0809   WBC Thousand/uL 6.83   < > 15.61*   HEMOGLOBIN g/dL 10.7*   < > 12.1   HEMATOCRIT % 35.4   < > 40.2   PLATELETS Thousands/uL 253   < > 298   SEGS PCT %  --   --  93*   LYMPHO PCT %  --   --  3*   MONO PCT %  --   --  4   EOS PCT %  --   --  0    < > = values in this interval not displayed.     Results from last 7 days   Lab Units 02/28/25  0711 02/26/25  0605 02/25/25  0838   SODIUM mmol/L 141   < > 137   POTASSIUM mmol/L 4.1   < > 4.2   CHLORIDE mmol/L 105   < > 104   CO2 mmol/L 31   < > 24   BUN mg/dL 26*   < > 27*   CREATININE mg/dL 0.67   < > 1.01   ANION GAP mmol/L 5   < > 9   CALCIUM mg/dL 9.3   < > 9.2   ALBUMIN g/dL  --   --  3.5   TOTAL BILIRUBIN mg/dL  --   --  0.37   ALK PHOS U/L  --   --  89   ALT U/L  --   --  21   AST U/L  --   --  40*   GLUCOSE RANDOM mg/dL 108   < > 135    < > = values in this interval not displayed.     Results from last 7 days   Lab Units 02/28/25  0711   INR  3.65*             Results from last 7 days   Lab Units 02/28/25  0711 02/27/25  0501 02/26/25  0605 02/25/25  0838   LACTIC ACID mmol/L  --   --   --  2.0   PROCALCITONIN ng/ml 2.93* 6.68* 7.00* 1.42*       Recent Cultures (last 7 days):   Results from last 7 days   Lab Units 02/25/25  0810 02/25/25  0809 02/25/25  0000   BLOOD CULTURE  No Growth at 72 hrs. No Growth at 72 hrs.  --    LEGIONELLA URINARY ANTIGEN   --   --  Negative       Imaging Results Review: I reviewed  radiology reports from this admission including: chest xray and CT chest.  Other Study Results Review: No additional pertinent studies reviewed.    Last 24 Hours Medication List:     Current Facility-Administered Medications:     acetaminophen (TYLENOL) tablet 650 mg, Q6H PRN    albuterol inhalation solution 2.5 mg, Q4H PRN    aspirin (ECOTRIN LOW STRENGTH) EC tablet 81 mg, Daily    benzonatate (TESSALON PERLES) capsule 100 mg, TID PRN    cefTRIAXone (ROCEPHIN) 2,000 mg in dextrose 5 % 50 mL IVPB, Q24H, Last Rate: 2,000 mg (02/28/25 1006)    furosemide (LASIX) tablet 40 mg, Daily    guaiFENesin (MUCINEX) 12 hr tablet 600 mg, BID    levothyroxine tablet 50 mcg, Daily    ondansetron (ZOFRAN) injection 4 mg, Q6H PRN    pantoprazole (PROTONIX) EC tablet 40 mg, Early Morning    pravastatin (PRAVACHOL) tablet 80 mg, Daily With Dinner    pregabalin (LYRICA) capsule 25 mg, BID    sertraline (ZOLOFT) tablet 100 mg, Daily    warfarin (COUMADIN) tablet 2.5 mg, Once per day on Monday Tuesday Wednesday Thursday Friday Saturday    [START ON 3/2/2025] warfarin (COUMADIN) tablet 5 mg, Q7 Days    Administrative Statements   Today, Patient Was Seen By: Ector Elizabeth PA-C  I have spent a total time of 35 minutes in caring for this patient on the day of the visit/encounter including Documenting in the medical record, Reviewing/placing orders in the medical record (including tests, medications, and/or procedures), Obtaining or reviewing history  , and Communicating with other healthcare professionals .    **Please Note: This note may have been constructed using a voice recognition system.**

## 2025-02-28 NOTE — PLAN OF CARE
Problem: PAIN - ADULT  Goal: Verbalizes/displays adequate comfort level or baseline comfort level  Description: Interventions:  - Encourage patient to monitor pain and request assistance  - Assess pain using appropriate pain scale  - Administer analgesics based on type and severity of pain and evaluate response  - Implement non-pharmacological measures as appropriate and evaluate response  - Consider cultural and social influences on pain and pain management  - Notify physician/advanced practitioner if interventions unsuccessful or patient reports new pain  2/28/2025 0837 by Yolanda Lam RN  Outcome: Progressing  2/28/2025 0837 by Yolanda Lam RN  Outcome: Progressing     Problem: INFECTION - ADULT  Goal: Absence or prevention of progression during hospitalization  Description: INTERVENTIONS:  - Assess and monitor for signs and symptoms of infection  - Monitor lab/diagnostic results  - Monitor all insertion sites, i.e. indwelling lines, tubes, and drains  - Monitor endotracheal if appropriate and nasal secretions for changes in amount and color  - [place] appropriate cooling/warming therapies per order  - Administer medications as ordered  - Instruct and encourage patient and family to use good hand hygiene technique  - Identify and instruct in appropriate isolation precautions for identified infection/condition  Outcome: Progressing     Problem: SAFETY ADULT  Goal: Patient will remain free of falls  Description: INTERVENTIONS:  - Educate patient/family on patient safety including physical limitations  - Instruct patient to call for assistance with activity   - Consult OT/PT to assist with strengthening/mobility   - Keep Call bell within reach  - Keep bed low and locked with side rails adjusted as appropriate  - Keep care items and personal belongings within reach  - Initiate and maintain comfort rounds  - Make Fall Risk Sign visible to staff  - Offer Toileting every  Hours, in advance of need  -  Initiate/Maintain alarm  - Obtain necessary fall risk management equipment:   - Apply yellow socks and bracelet for high fall risk patients  - Consider moving patient to room near nurses station  Outcome: Progressing  Goal: Maintain or return to baseline ADL function  Description: INTERVENTIONS:  -  Assess patient's ability to carry out ADLs; assess patient's baseline for ADL function and identify physical deficits which impact ability to perform ADLs (bathing, care of mouth/teeth, toileting, grooming, dressing, etc.)  - Assess/evaluate cause of self-care deficits   - Assess range of motion  - Assess patient's mobility; develop plan if impaired  - Assess patient's need for assistive devices and provide as appropriate  - Encourage maximum independence but intervene and supervise when necessary  - Involve family in performance of ADLs  - Assess for home care needs following discharge   - Consider OT consult to assist with ADL evaluation and planning for discharge  - Provide patient education as appropriate  Outcome: Progressing  Goal: Maintains/Returns to pre admission functional level  Description: INTERVENTIONS:  - Perform AM-PAC 6 Click Basic Mobility/ Daily Activity assessment daily.  - Set and communicate daily mobility goal to care team and patient/family/caregiver.   - Collaborate with rehabilitation services on mobility goals if consulted  - Perform Range of Motion  times a day.  - Reposition patient every  hours.  - Dangle patient  times a day  - Stand patient  times a day  - Ambulate patient  times a day  - Out of bed to chair  times a day   - Out of bed for meals times a day  - Out of bed for toileting  - Record patient progress and toleration of activity level   Outcome: Progressing     Problem: DISCHARGE PLANNING  Goal: Discharge to home or other facility with appropriate resources  Description: INTERVENTIONS:  - Identify barriers to discharge w/patient and caregiver  - Arrange for needed discharge  resources and transportation as appropriate  - Identify discharge learning needs (meds, wound care, etc.)  - Arrange for interpretive services to assist at discharge as needed  - Refer to Case Management Department for coordinating discharge planning if the patient needs post-hospital services based on physician/advanced practitioner order or complex needs related to functional status, cognitive ability, or social support system  Outcome: Progressing     Problem: Knowledge Deficit  Goal: Patient/family/caregiver demonstrates understanding of disease process, treatment plan, medications, and discharge instructions  Description: Complete learning assessment and assess knowledge base.  Interventions:  - Provide teaching at level of understanding  - Provide teaching via preferred learning methods  Outcome: Progressing     Problem: Prexisting or High Potential for Compromised Skin Integrity  Goal: Skin integrity is maintained or improved  Description: INTERVENTIONS:  - Identify patients at risk for skin breakdown  - Assess and monitor skin integrity  - Assess and monitor nutrition and hydration status  - Monitor labs   - Assess for incontinence   - Turn and reposition patient  - Assist with mobility/ambulation  - Relieve pressure over bony prominences  - Avoid friction and shearing  - Provide appropriate hygiene as needed including keeping skin clean and dry  - Evaluate need for skin moisturizer/barrier cream  - Collaborate with interdisciplinary team   - Patient/family teaching  - Consider wound care consult   Outcome: Progressing

## 2025-02-28 NOTE — RESPIRATORY THERAPY NOTE
02/27/25 9703   Respiratory Assessment   Assessment Type Assess only   General Appearance Awake   Respiratory Pattern Normal   Chest Assessment Chest expansion symmetrical   Bilateral Breath Sounds Diminished   Resp Comments pt placed on BiPAP for hours of sleep   O2 Device v60   Non-Invasive Information   O2 Interface Device Face mask   Non-Invasive Ventilation Mode BiPAP   $ Intermittent NIV Yes   $ Pulse Oximetry Spot Check Charge Completed   Non-Invasive Settings   IPAP (cm) 16 cm   EPAP (cm) 8 cm   Rate (Set) 8   FiO2 (%) 40   Pressure Support (cm H2O) 8   Rise Time 2   Inspiratory Time (Set) 1   Non-Invasive Readings   Total Rate 25   MV (Mech) 12.8   Peak Pressure (Obs) 17   Spontaneous Vt (mL) 492   Leak (lpm) 0   Skin Intervention Skin intact   Non-Invasive Alarms   Insp Pressure High (cm H20) 25   Insp Pressure Low (cm H20) 5   Low Insp Pressure Time (sec) 20 sec   MV Low (L/min) 2   Vt High (mL) 1200   Vt Low (mL) 200   High Resp Rate (BPM) 50 BPM   Low Resp Rate (BPM) 8 BPM

## 2025-02-28 NOTE — ASSESSMENT & PLAN NOTE
Body mass index is 43.22 kg/m².  Recommend incorporating a more whole foods plant-predominant diet along with decreasing consumption of red meats and processed foods  Per AHA guidelines, recommend moderate-vigorous intensity exercise for 30 minutes a day for 5 days a week or a total of 150 min/week

## 2025-02-28 NOTE — CASE MANAGEMENT
Case Management Progress Note    Patient name Marisol Otoole  Location /-01 MRN [id number]  : 1940 Date 2025       LOS (days): 3  Geometric Mean LOS (GMLOS) (days): 4.9  Days to GMLOS:1.7        OBJECTIVE:        Current admission status: Inpatient  Preferred Pharmacy:   [id number] [place] - [address] [address]  Phone: [phone] Fax: [phone]    Primary Care Provider: MABEL Hallman    Primary Insurance: MEDICARE  Secondary Insurance:     PROGRESS NOTE:  As per SLIM rounds, patient is anticipated for DC in 24-48 hrs.  CM sent message to [facility] in [facility] to inform them of same.  HHC info is already on AVS and no other CM needs have been identified at this time. CM Dept will continue to follow.

## 2025-03-01 PROBLEM — R79.1 SUPRATHERAPEUTIC INR: Status: ACTIVE | Noted: 2025-03-01

## 2025-03-01 LAB
ANION GAP SERPL CALCULATED.3IONS-SCNC: 7 MMOL/L (ref 4–13)
BUN SERPL-MCNC: 28 MG/DL (ref 5–25)
CALCIUM SERPL-MCNC: 9 MG/DL (ref 8.4–10.2)
CHLORIDE SERPL-SCNC: 102 MMOL/L (ref 96–108)
CO2 SERPL-SCNC: 32 MMOL/L (ref 21–32)
CREAT SERPL-MCNC: 0.61 MG/DL (ref 0.6–1.3)
ERYTHROCYTE [DISTWIDTH] IN BLOOD BY AUTOMATED COUNT: 20.1 % (ref 11.6–15.1)
GFR SERPL CREATININE-BSD FRML MDRD: 82 ML/MIN/1.73SQ M
GLUCOSE SERPL-MCNC: 105 MG/DL (ref 65–140)
HCT VFR BLD AUTO: 37.7 % (ref 34.8–46.1)
HGB BLD-MCNC: 11.2 G/DL (ref 11.5–15.4)
INR PPP: 4.03 (ref 0.85–1.19)
MCH RBC QN AUTO: 24.7 PG (ref 26.8–34.3)
MCHC RBC AUTO-ENTMCNC: 29.7 G/DL (ref 31.4–37.4)
MCV RBC AUTO: 83 FL (ref 82–98)
PLATELET # BLD AUTO: 322 THOUSANDS/UL (ref 149–390)
PMV BLD AUTO: 9.5 FL (ref 8.9–12.7)
POTASSIUM SERPL-SCNC: 3.6 MMOL/L (ref 3.5–5.3)
PROTHROMBIN TIME: 39.6 SECONDS (ref 12.3–15)
RBC # BLD AUTO: 4.54 MILLION/UL (ref 3.81–5.12)
SODIUM SERPL-SCNC: 141 MMOL/L (ref 135–147)
WBC # BLD AUTO: 9.6 THOUSAND/UL (ref 4.31–10.16)

## 2025-03-01 PROCEDURE — 94664 DEMO&/EVAL PT USE INHALER: CPT

## 2025-03-01 PROCEDURE — 85610 PROTHROMBIN TIME: CPT | Performed by: NURSE PRACTITIONER

## 2025-03-01 PROCEDURE — 99232 SBSQ HOSP IP/OBS MODERATE 35: CPT

## 2025-03-01 PROCEDURE — 94660 CPAP INITIATION&MGMT: CPT

## 2025-03-01 PROCEDURE — 80048 BASIC METABOLIC PNL TOTAL CA: CPT

## 2025-03-01 PROCEDURE — 94760 N-INVAS EAR/PLS OXIMETRY 1: CPT

## 2025-03-01 PROCEDURE — 85027 COMPLETE CBC AUTOMATED: CPT

## 2025-03-01 RX ORDER — MAGNESIUM HYDROXIDE/ALUMINUM HYDROXICE/SIMETHICONE 120; 1200; 1200 MG/30ML; MG/30ML; MG/30ML
30 SUSPENSION ORAL EVERY 4 HOURS PRN
Status: DISCONTINUED | OUTPATIENT
Start: 2025-03-01 | End: 2025-03-04 | Stop reason: HOSPADM

## 2025-03-01 RX ADMIN — PRAVASTATIN SODIUM 80 MG: 80 TABLET ORAL at 16:59

## 2025-03-01 RX ADMIN — SERTRALINE HYDROCHLORIDE 100 MG: 50 TABLET ORAL at 08:54

## 2025-03-01 RX ADMIN — FUROSEMIDE 40 MG: 40 TABLET ORAL at 08:54

## 2025-03-01 RX ADMIN — LEVOTHYROXINE SODIUM 50 MCG: 0.05 TABLET ORAL at 05:25

## 2025-03-01 RX ADMIN — PANTOPRAZOLE SODIUM 40 MG: 40 TABLET, DELAYED RELEASE ORAL at 05:25

## 2025-03-01 RX ADMIN — ACETAMINOPHEN 650 MG: 325 TABLET, FILM COATED ORAL at 08:58

## 2025-03-01 RX ADMIN — ALUMINUM HYDROXIDE, MAGNESIUM HYDROXIDE, AND DIMETHICONE 30 ML: 200; 20; 200 SUSPENSION ORAL at 14:28

## 2025-03-01 RX ADMIN — PREGABALIN 25 MG: 25 CAPSULE ORAL at 08:54

## 2025-03-01 RX ADMIN — GUAIFENESIN 600 MG: 600 TABLET ORAL at 08:54

## 2025-03-01 RX ADMIN — CEFTRIAXONE SODIUM 2000 MG: 10 INJECTION, POWDER, FOR SOLUTION INTRAVENOUS at 08:05

## 2025-03-01 RX ADMIN — GUAIFENESIN 600 MG: 600 TABLET ORAL at 17:00

## 2025-03-01 RX ADMIN — PREGABALIN 25 MG: 25 CAPSULE ORAL at 17:00

## 2025-03-01 RX ADMIN — ONDANSETRON 4 MG: 2 INJECTION INTRAMUSCULAR; INTRAVENOUS at 10:40

## 2025-03-01 RX ADMIN — ASPIRIN 81 MG: 81 TABLET, COATED ORAL at 08:54

## 2025-03-01 NOTE — RESPIRATORY THERAPY NOTE
03/01/25 0733   Respiratory Protocol   Protocol Initiated? No   Protocol Selection Respiratory   Language Barrier? No   Medical & Social History Reviewed? Yes   Diagnostic Studies Reviewed? Yes   Physical Assessment Performed? Yes   Home Devices/Therapy BiPAP/CPAP;Home O2   Respiratory Plan Discontinue Protocol   Respiratory Assessment   Assessment Type Assess only   General Appearance Sleeping   Respiratory Pattern Normal   Chest Assessment Chest expansion symmetrical   Bilateral Breath Sounds Diminished   Resp Comments will dc protocol, pt has not req nebs in 48 hrs+   O2 Device v60   Additional Assessments   Pulse 74   Respirations 15   SpO2 92 %

## 2025-03-01 NOTE — ASSESSMENT & PLAN NOTE
Body mass index is 43.64 kg/m².  Recommend incorporating a more whole foods plant-predominant diet along with decreasing consumption of red meats and processed foods  Per AHA guidelines, recommend moderate-vigorous intensity exercise for 30 minutes a day for 5 days a week or a total of 150 min/week

## 2025-03-01 NOTE — ASSESSMENT & PLAN NOTE
Patient on imaging with concern for a left lower lung opacity  CT chest with evidence of bibasilar pneumonia he will air lymph nodes  Patient given IV cefepime in the ED, and transition to ceftriaxone, will continue  Strep and Legionella are negative  Procalcitonin elevated at 1.42 repeat 7.00>6.68>2.93  MRSA culture negative  Received Solu-Medrol IV x 2 doses for wheezing with improvement  Continue ATC nebulizers  Continue Tessalon Perles as needed and Mucinex  Respiratory protocol  Incentive spirometry

## 2025-03-01 NOTE — PROGRESS NOTES
Progress Note - Hospitalist   Name: Marisol Otoole 84 y.o. female I MRN: [id number]  Unit/Bed#: -01 I Date of Admission: 2/25/2025   Date of Service: 3/1/2025 I Hospital Day: 4    Assessment & Plan  Pneumonia  Patient on imaging with concern for a left lower lung opacity  CT chest with evidence of bibasilar pneumonia he will air lymph nodes  Patient given IV cefepime in the ED, and transition to ceftriaxone, will continue  Strep and Legionella are negative  Procalcitonin elevated at 1.42 repeat 7.00>6.68>2.93  MRSA culture negative  Received Solu-Medrol IV x 2 doses for wheezing with improvement  Continue ATC nebulizers  Continue Tessalon Perles as needed and Mucinex  Respiratory protocol  Incentive spirometry  Sepsis (HCC)  Patient meeting sepsis criteria by fever, tachycardia, tachypnea leukocytosis  Continue IV antibiotics  Blood cultures x 2 no growth at 72 hours  No evidence of organ dysfunction  Respiratory protocol  CBC BMP in a.m.  Acute and chronic respiratory failure with hypoxia (HCC)  Patient is CPAP and O2 dependent at night only  Patient upon admission to be desatting in ED and 70s requiring BiPAP she was weaned to 4 L nasal cannula patient's baseline is 2 L (although patient reports she wears oxygen intermittently, however she also reports her baseline oxygen saturation is high 80s at baseline)   Continue to wean oxygen maintaining sats greater than 90%  Respiratory protocol  GERD (gastroesophageal reflux disease)  Continue PPI  Acquired hypothyroidism  Continue levothyroxine  Primary hypertension  Blood pressure soft on admission improved with IV fluids  Continue to monitor routine vitals per unit  JANICE (obstructive sleep apnea)  Continue BiPAP nightly  Morbid obesity due to excess calories (HCC)  Body mass index is 43.64 kg/m².  Recommend incorporating a more whole foods plant-predominant diet along with decreasing consumption of red meats and processed foods  Per AHA guidelines, recommend  moderate-vigorous intensity exercise for 30 minutes a day for 5 days a week or a total of 150 min/week    Chronic heart failure with preserved ejection fraction (HFpEF) (Formerly [facility])  Wt Readings from Last 3 Encounters:   03/01/25 82.1 kg (181 lb)   01/31/25 79.8 kg (175 lb 14.8 oz)   01/29/25 79.8 kg (175 lb 14.8 oz)     Last echo was from 2022 LVEF 60%, grade 1 diastolic dysfunction status post TAVR no evidence of volume overload  Weight appears stable at baseline  I/O, daily weight  Resume diuretic currently resume daily patient takes every other day        Paroxysmal atrial fibrillation (HCC)  Known history of A-fib  Currently not on beta-blocker  Patient on Coumadin for AC  Holding Coumadin for INR 4.03  INR in a.m.  Influenza A  4-day history of increased shortness of breath not feeling well  Patient out of the window for Tamiflu  Supportive measures  Supratherapeutic INR      VTE Pharmacologic Prophylaxis: VTE Score: 9 High Risk (Score >/= 5) - Pharmacological DVT Prophylaxis Ordered: warfarin (Coumadin). Sequential Compression Devices Ordered.    Mobility:   Basic Mobility Inpatient Raw Score: 18  JH-HLM Goal: 6: Walk 10 steps or more  JH-HLM Achieved: 4: Move to chair/commode  JH-HLM Goal achieved. Continue to encourage appropriate mobility.    Patient Centered Rounds: I performed bedside rounds with nursing staff today.   Discussions with Specialists or Other Care Team Provider: Yes    Education and Discussions with Family / Patient: Attempted to update  (friend) via phone. Left voicemail.     Current Length of Stay: 4 day(s)  Current Patient Status: Inpatient   Certification Statement: The patient will continue to require additional inpatient hospital stay due to IV antibiotic and supratherapeutic INR  Discharge Plan: Anticipate discharge in 24-48 hrs to home.    Code Status: Level 3 - DNAR and DNI    Subjective   Seen and examined in bed.  Reports improvement in shortness of breath and no wheezes  appreciated on examination.  She denies any chest pain, palpitations, lightheadedness nausea or vomiting.    Objective :  Temp:  [96.8 °F (36 °C)-99 °F (37.2 °C)] 96.8 °F (36 °C)  HR:  [] 152  BP: (116-154)/(64-79) 116/79  Resp:  [15] 15  SpO2:  [92 %-96 %] 96 %    Body mass index is 43.64 kg/m².     Input and Output Summary (last 24 hours):     Intake/Output Summary (Last 24 hours) at 3/1/2025 1359  Last data filed at 3/1/2025 0536  Gross per 24 hour   Intake 180 ml   Output 700 ml   Net -520 ml       Physical Exam  Vitals and nursing note reviewed.   Constitutional:       General: She is not in acute distress.     Appearance: She is well-developed. She is obese.   HENT:      Head: Normocephalic and atraumatic.   Eyes:      Conjunctiva/sclera: Conjunctivae normal.   Cardiovascular:      Rate and Rhythm: Normal rate and regular rhythm.      Heart sounds: No murmur heard.  Pulmonary:      Effort: Pulmonary effort is normal. No respiratory distress.      Breath sounds: Normal breath sounds. No wheezing.   Abdominal:      Palpations: Abdomen is soft.      Tenderness: There is no abdominal tenderness.   Musculoskeletal:         General: No swelling.      Cervical back: Neck supple.   Skin:     General: Skin is warm and dry.      Capillary Refill: Capillary refill takes less than 2 seconds.   Neurological:      Mental Status: She is alert. Mental status is at baseline.   Psychiatric:         Mood and Affect: Mood normal.           Lines/Drains:              Lab Results: I have reviewed the following results:   Results from last 7 days   Lab Units 03/01/25  0514 02/26/25  0605 02/25/25  0809   WBC Thousand/uL 9.60   < > 15.61*   HEMOGLOBIN g/dL 11.2*   < > 12.1   HEMATOCRIT % 37.7   < > 40.2   PLATELETS Thousands/uL 322   < > 298   SEGS PCT %  --   --  93*   LYMPHO PCT %  --   --  3*   MONO PCT %  --   --  4   EOS PCT %  --   --  0    < > = values in this interval not displayed.     Results from last 7 days   Lab  Units 03/01/25  0514 02/26/25  0605 02/25/25  0838   SODIUM mmol/L 141   < > 137   POTASSIUM mmol/L 3.6   < > 4.2   CHLORIDE mmol/L 102   < > 104   CO2 mmol/L 32   < > 24   BUN mg/dL 28*   < > 27*   CREATININE mg/dL 0.61   < > 1.01   ANION GAP mmol/L 7   < > 9   CALCIUM mg/dL 9.0   < > 9.2   ALBUMIN g/dL  --   --  3.5   TOTAL BILIRUBIN mg/dL  --   --  0.37   ALK PHOS U/L  --   --  89   ALT U/L  --   --  21   AST U/L  --   --  40*   GLUCOSE RANDOM mg/dL 105   < > 135    < > = values in this interval not displayed.     Results from last 7 days   Lab Units 03/01/25  0514   INR  4.03*             Results from last 7 days   Lab Units 02/28/25  0711 02/27/25  0501 02/26/25  0605 02/25/25  0838   LACTIC ACID mmol/L  --   --   --  2.0   PROCALCITONIN ng/ml 2.93* 6.68* 7.00* 1.42*       Recent Cultures (last 7 days):   Results from last 7 days   Lab Units 02/25/25  0810 02/25/25  0809 02/25/25  0000   BLOOD CULTURE  No Growth After 4 Days. No Growth After 4 Days.  --    LEGIONELLA URINARY ANTIGEN   --   --  Negative             Last 24 Hours Medication List:     Current Facility-Administered Medications:     acetaminophen (TYLENOL) tablet 650 mg, Q6H PRN    albuterol inhalation solution 2.5 mg, Q4H PRN    aluminum-magnesium hydroxide-simethicone (MAALOX) oral suspension 30 mL, Q4H PRN    aspirin (ECOTRIN LOW STRENGTH) EC tablet 81 mg, Daily    benzonatate (TESSALON PERLES) capsule 100 mg, TID PRN    cefTRIAXone (ROCEPHIN) 2,000 mg in dextrose 5 % 50 mL IVPB, Q24H, Last Rate: 2,000 mg (03/01/25 0805)    furosemide (LASIX) tablet 40 mg, Daily    guaiFENesin (MUCINEX) 12 hr tablet 600 mg, BID    levothyroxine tablet 50 mcg, Daily    ondansetron (ZOFRAN) injection 4 mg, Q6H PRN    pantoprazole (PROTONIX) EC tablet 40 mg, Early Morning    pravastatin (PRAVACHOL) tablet 80 mg, Daily With Dinner    pregabalin (LYRICA) capsule 25 mg, BID    sertraline (ZOLOFT) tablet 100 mg, Daily    [Held by provider] warfarin (COUMADIN) tablet 2.5  mg, Once per day on Monday Tuesday Wednesday Thursday Friday Saturday    [Held by provider] warfarin (COUMADIN) tablet 5 mg, Q7 Days    Administrative Statements   Today, Patient Was Seen By: MABEL Tapia      **Please Note: This note may have been constructed using a voice recognition system.**

## 2025-03-01 NOTE — ASSESSMENT & PLAN NOTE
Known history of A-fib  Currently not on beta-blocker  Patient on Coumadin for AC  Holding Coumadin for INR 4.03  INR in a.m.

## 2025-03-01 NOTE — RESPIRATORY THERAPY NOTE
02/28/25 2328   Respiratory Assessment   Assessment Type Assess only   General Appearance Awake   Respiratory Pattern Normal   Chest Assessment Chest expansion symmetrical   Bilateral Breath Sounds Diminished   Resp Comments pt placed on BiPAP for hours of sleep   Non-Invasive Information   O2 Interface Device Face mask   Non-Invasive Ventilation Mode BiPAP   $ Intermittent NIV Yes   $ Pulse Oximetry Spot Check Charge Completed   Non-Invasive Settings   IPAP (cm) 16 cm   EPAP (cm) 8 cm   Rate (Set) 8   FiO2 (%) 40   Pressure Support (cm H2O) 8   Rise Time 2   Inspiratory Time (Set) 1   Non-Invasive Readings   Total Rate 19   MV (Mech) 11   Peak Pressure (Obs) 17   Spontaneous Vt (mL) 578   Leak (lpm) 7   Skin Intervention Skin intact   Non-Invasive Alarms   Insp Pressure High (cm H20) 25   Insp Pressure Low (cm H20) 5   Low Insp Pressure Time (sec) 20 sec   MV Low (L/min) 2   Vt High (mL) 1200   Vt Low (mL) 200   High Resp Rate (BPM) 50 BPM   Low Resp Rate (BPM) 8 BPM

## 2025-03-01 NOTE — ASSESSMENT & PLAN NOTE
Wt Readings from Last 3 Encounters:   03/01/25 82.1 kg (181 lb)   01/31/25 79.8 kg (175 lb 14.8 oz)   01/29/25 79.8 kg (175 lb 14.8 oz)     Last echo was from 2022 LVEF 60%, grade 1 diastolic dysfunction status post TAVR no evidence of volume overload  Weight appears stable at baseline  I/O, daily weight  Resume diuretic currently resume daily patient takes every other day

## 2025-03-02 LAB
ANION GAP SERPL CALCULATED.3IONS-SCNC: 7 MMOL/L (ref 4–13)
BACTERIA BLD CULT: NORMAL
BACTERIA BLD CULT: NORMAL
BASOPHILS # BLD MANUAL: 0 THOUSAND/UL (ref 0–0.1)
BASOPHILS NFR MAR MANUAL: 0 % (ref 0–1)
BUN SERPL-MCNC: 33 MG/DL (ref 5–25)
CALCIUM SERPL-MCNC: 9.1 MG/DL (ref 8.4–10.2)
CHLORIDE SERPL-SCNC: 101 MMOL/L (ref 96–108)
CO2 SERPL-SCNC: 31 MMOL/L (ref 21–32)
CREAT SERPL-MCNC: 0.77 MG/DL (ref 0.6–1.3)
EOSINOPHIL # BLD MANUAL: 0.67 THOUSAND/UL (ref 0–0.4)
EOSINOPHIL NFR BLD MANUAL: 6 % (ref 0–6)
ERYTHROCYTE [DISTWIDTH] IN BLOOD BY AUTOMATED COUNT: 20.1 % (ref 11.6–15.1)
GFR SERPL CREATININE-BSD FRML MDRD: 70 ML/MIN/1.73SQ M
GLUCOSE SERPL-MCNC: 91 MG/DL (ref 65–140)
HCT VFR BLD AUTO: 37.8 % (ref 34.8–46.1)
HGB BLD-MCNC: 11.4 G/DL (ref 11.5–15.4)
INR PPP: 3.61 (ref 0.85–1.19)
LYMPHOCYTES # BLD AUTO: 3.33 THOUSAND/UL (ref 0.6–4.47)
LYMPHOCYTES # BLD AUTO: 30 % (ref 14–44)
MCH RBC QN AUTO: 25.3 PG (ref 26.8–34.3)
MCHC RBC AUTO-ENTMCNC: 30.2 G/DL (ref 31.4–37.4)
MCV RBC AUTO: 84 FL (ref 82–98)
MONOCYTES # BLD AUTO: 0.89 THOUSAND/UL (ref 0–1.22)
MONOCYTES NFR BLD: 8 % (ref 4–12)
NEUTROPHILS # BLD MANUAL: 6.21 THOUSAND/UL (ref 1.85–7.62)
NEUTS SEG NFR BLD AUTO: 56 % (ref 43–75)
PLATELET # BLD AUTO: 322 THOUSANDS/UL (ref 149–390)
PLATELET BLD QL SMEAR: ADEQUATE
PMV BLD AUTO: 8.9 FL (ref 8.9–12.7)
POTASSIUM SERPL-SCNC: 3.9 MMOL/L (ref 3.5–5.3)
PROTHROMBIN TIME: 36.5 SECONDS (ref 12.3–15)
RBC # BLD AUTO: 4.51 MILLION/UL (ref 3.81–5.12)
SODIUM SERPL-SCNC: 139 MMOL/L (ref 135–147)
WBC # BLD AUTO: 11.09 THOUSAND/UL (ref 4.31–10.16)

## 2025-03-02 PROCEDURE — 99232 SBSQ HOSP IP/OBS MODERATE 35: CPT

## 2025-03-02 PROCEDURE — 80048 BASIC METABOLIC PNL TOTAL CA: CPT

## 2025-03-02 PROCEDURE — 85610 PROTHROMBIN TIME: CPT | Performed by: NURSE PRACTITIONER

## 2025-03-02 PROCEDURE — 94760 N-INVAS EAR/PLS OXIMETRY 1: CPT

## 2025-03-02 PROCEDURE — 85027 COMPLETE CBC AUTOMATED: CPT

## 2025-03-02 PROCEDURE — 85007 BL SMEAR W/DIFF WBC COUNT: CPT

## 2025-03-02 PROCEDURE — 94660 CPAP INITIATION&MGMT: CPT

## 2025-03-02 RX ORDER — METHYLPREDNISOLONE SODIUM SUCCINATE 40 MG/ML
40 INJECTION, POWDER, LYOPHILIZED, FOR SOLUTION INTRAMUSCULAR; INTRAVENOUS DAILY
Status: DISCONTINUED | OUTPATIENT
Start: 2025-03-02 | End: 2025-03-03

## 2025-03-02 RX ADMIN — FUROSEMIDE 40 MG: 40 TABLET ORAL at 08:24

## 2025-03-02 RX ADMIN — GUAIFENESIN 600 MG: 600 TABLET ORAL at 08:24

## 2025-03-02 RX ADMIN — GUAIFENESIN 600 MG: 600 TABLET ORAL at 17:42

## 2025-03-02 RX ADMIN — SERTRALINE HYDROCHLORIDE 100 MG: 50 TABLET ORAL at 08:23

## 2025-03-02 RX ADMIN — METHYLPREDNISOLONE SODIUM SUCCINATE 40 MG: 40 INJECTION, POWDER, FOR SOLUTION INTRAMUSCULAR; INTRAVENOUS at 13:11

## 2025-03-02 RX ADMIN — PRAVASTATIN SODIUM 80 MG: 80 TABLET ORAL at 16:42

## 2025-03-02 RX ADMIN — PANTOPRAZOLE SODIUM 40 MG: 40 TABLET, DELAYED RELEASE ORAL at 05:07

## 2025-03-02 RX ADMIN — PREGABALIN 25 MG: 25 CAPSULE ORAL at 17:42

## 2025-03-02 RX ADMIN — Medication 1 SPRAY: at 23:21

## 2025-03-02 RX ADMIN — CEFTRIAXONE SODIUM 2000 MG: 10 INJECTION, POWDER, FOR SOLUTION INTRAVENOUS at 08:23

## 2025-03-02 RX ADMIN — LEVOTHYROXINE SODIUM 50 MCG: 0.05 TABLET ORAL at 05:07

## 2025-03-02 RX ADMIN — ASPIRIN 81 MG: 81 TABLET, COATED ORAL at 08:24

## 2025-03-02 RX ADMIN — PREGABALIN 25 MG: 25 CAPSULE ORAL at 08:23

## 2025-03-02 NOTE — RESPIRATORY THERAPY NOTE
03/01/25 2148   Respiratory Assessment   Assessment Type Assess only   General Appearance Awake   Respiratory Pattern Normal   Chest Assessment Chest expansion symmetrical   Bilateral Breath Sounds Diminished   Resp Comments pt placed on BiPAP for hours of sleep   O2 Device v60   Non-Invasive Information   O2 Interface Device Face mask   Non-Invasive Ventilation Mode BiPAP   $ Intermittent NIV Yes   $ Pulse Oximetry Spot Check Charge Completed   Non-Invasive Settings   IPAP (cm) 16 cm   EPAP (cm) 8 cm   Rate (Set) 8   FiO2 (%) 40   Pressure Support (cm H2O) 8   Rise Time 2   Inspiratory Time (Set) 1   Non-Invasive Readings   Total Rate 21   MV (Mech) 12.7   Peak Pressure (Obs) 17   Spontaneous Vt (mL) 635   Leak (lpm) 16   Skin Intervention Skin intact   Non-Invasive Alarms   Insp Pressure High (cm H20) 25   Insp Pressure Low (cm H20) 5   Low Insp Pressure Time (sec) 20 sec   MV Low (L/min) 2   Vt High (mL) 1200   Vt Low (mL) 200   High Resp Rate (BPM) 50 BPM   Low Resp Rate (BPM) 8 BPM

## 2025-03-02 NOTE — PLAN OF CARE
Problem: PAIN - ADULT  Goal: Verbalizes/displays adequate comfort level or baseline comfort level  Description: Interventions:  - Encourage patient to monitor pain and request assistance  - Assess pain using appropriate pain scale  - Administer analgesics based on type and severity of pain and evaluate response  - Implement non-pharmacological measures as appropriate and evaluate response  - Consider cultural and social influences on pain and pain management  - Notify physician/advanced practitioner if interventions unsuccessful or patient reports new pain  Outcome: Progressing     Problem: INFECTION - ADULT  Goal: Absence or prevention of progression during hospitalization  Description: INTERVENTIONS:  - Assess and monitor for signs and symptoms of infection  - Monitor lab/diagnostic results  - Monitor all insertion sites, i.e. indwelling lines, tubes, and drains  - Monitor endotracheal if appropriate and nasal secretions for changes in amount and color  - [place] appropriate cooling/warming therapies per order  - Administer medications as ordered  - Instruct and encourage patient and family to use good hand hygiene technique  - Identify and instruct in appropriate isolation precautions for identified infection/condition  Outcome: Progressing     Problem: SAFETY ADULT  Goal: Patient will remain free of falls  Description: INTERVENTIONS:  - Educate patient/family on patient safety including physical limitations  - Instruct patient to call for assistance with activity   - Consult OT/PT to assist with strengthening/mobility   - Keep Call bell within reach  - Keep bed low and locked with side rails adjusted as appropriate  - Keep care items and personal belongings within reach  - Initiate and maintain comfort rounds  - Make Fall Risk Sign visible to staff  - Offer Toileting every  Hours, in advance of need  - Initiate/Maintain alarm  - Obtain necessary fall risk management equipment:   - Apply yellow socks and  bracelet for high fall risk patients  - Consider moving patient to room near nurses station  Outcome: Progressing  Goal: Maintain or return to baseline ADL function  Description: INTERVENTIONS:  -  Assess patient's ability to carry out ADLs; assess patient's baseline for ADL function and identify physical deficits which impact ability to perform ADLs (bathing, care of mouth/teeth, toileting, grooming, dressing, etc.)  - Assess/evaluate cause of self-care deficits   - Assess range of motion  - Assess patient's mobility; develop plan if impaired  - Assess patient's need for assistive devices and provide as appropriate  - Encourage maximum independence but intervene and supervise when necessary  - Involve family in performance of ADLs  - Assess for home care needs following discharge   - Consider OT consult to assist with ADL evaluation and planning for discharge  - Provide patient education as appropriate  Outcome: Progressing  Goal: Maintains/Returns to pre admission functional level  Description: INTERVENTIONS:  - Perform AM-PAC 6 Click Basic Mobility/ Daily Activity assessment daily.  - Set and communicate daily mobility goal to care team and patient/family/caregiver.   - Collaborate with rehabilitation services on mobility goals if consulted  - Perform Range of Motion  times a day.  - Reposition patient every  hours.  - Dangle patient  times a day  - Stand patient  times a day  - Ambulate patient  times a day  - Out of bed to chair  times a day   - Out of bed for meals  times a day  - Out of bed for toileting  - Record patient progress and toleration of activity level   Outcome: Progressing     Problem: DISCHARGE PLANNING  Goal: Discharge to home or other facility with appropriate resources  Description: INTERVENTIONS:  - Identify barriers to discharge w/patient and caregiver  - Arrange for needed discharge resources and transportation as appropriate  - Identify discharge learning needs (meds, wound care, etc.)  -  Arrange for interpretive services to assist at discharge as needed  - Refer to Case Management Department for coordinating discharge planning if the patient needs post-hospital services based on physician/advanced practitioner order or complex needs related to functional status, cognitive ability, or social support system  Outcome: Progressing

## 2025-03-02 NOTE — ASSESSMENT & PLAN NOTE
Wt Readings from Last 3 Encounters:   03/02/25 81.8 kg (180 lb 5.4 oz)   01/31/25 79.8 kg (175 lb 14.8 oz)   01/29/25 79.8 kg (175 lb 14.8 oz)     Last echo was from 2022 LVEF 60%, grade 1 diastolic dysfunction status post TAVR no evidence of volume overload  Weight appears stable at baseline  I/O, daily weight  Resume diuretic currently resume daily patient takes every other day

## 2025-03-02 NOTE — ASSESSMENT & PLAN NOTE
Patient is CPAP and O2 dependent at night only  Patient upon admission to be desatting in ED and 70s requiring BiPAP she was weaned to 4 L nasal cannula patient's baseline is 2 L (although patient reports she wears oxygen intermittently, however she also reports her baseline oxygen saturation is high 80s at baseline)   Continue to wean oxygen maintaining sats greater than 90%  Will need home O2 eval prior to DC  Respiratory protocol

## 2025-03-02 NOTE — PROGRESS NOTES
Progress Note - Hospitalist   Name: Marisol Otoole 84 y.o. female I MRN: [id number]  Unit/Bed#: -01 I Date of Admission: 2/25/2025   Date of Service: 3/2/2025 I Hospital Day: 5    Assessment & Plan  Pneumonia  Patient on imaging with concern for a left lower lung opacity  CT chest with evidence of bibasilar pneumonia he will air lymph nodes  Patient given IV cefepime in the ED, and transition to ceftriaxone, will continue (received 5 total days of antibiotics, recommending to continue with antibiotics for 10 total days given slow progression improvement)  Strep and Legionella are negative  Procalcitonin elevated at 1.42 repeat 7.00>6.68>2.93  MRSA culture negative  Continue ATC nebulizers  Continue Tessalon Perles as needed and Mucinex  Respiratory protocol  Incentive spirometry  Sepsis (HCC)  Patient meeting sepsis criteria by fever, tachycardia, tachypnea leukocytosis  Continue IV antibiotics  Blood cultures x 2 no growth at 5 days  No evidence of organ dysfunction  Respiratory protocol  CBC BMP in a.m.  Acute and chronic respiratory failure with hypoxia (HCC)  Patient is CPAP and O2 dependent at night only  Patient upon admission to be desatting in ED and 70s requiring BiPAP she was weaned to 4 L nasal cannula patient's baseline is 2 L (although patient reports she wears oxygen intermittently, however she also reports her baseline oxygen saturation is high 80s at baseline)   Continue to wean oxygen maintaining sats greater than 90%  Will need home O2 eval prior to DC  Respiratory protocol  GERD (gastroesophageal reflux disease)  Continue PPI  Acquired hypothyroidism  Continue levothyroxine  Primary hypertension  Blood pressure soft on admission improved with IV fluids  Continue to monitor routine vitals per unit  JANICE (obstructive sleep apnea)  Continue BiPAP nightly  Morbid obesity due to excess calories (HCC)  Body mass index is 43.48 kg/m².  Recommend incorporating a more whole foods plant-predominant diet  along with decreasing consumption of red meats and processed foods  Per AHA guidelines, recommend moderate-vigorous intensity exercise for 30 minutes a day for 5 days a week or a total of 150 min/week    Chronic heart failure with preserved ejection fraction (HFpEF) (HCC)  Wt Readings from Last 3 Encounters:   03/02/25 81.8 kg (180 lb 5.4 oz)   01/31/25 79.8 kg (175 lb 14.8 oz)   01/29/25 79.8 kg (175 lb 14.8 oz)     Last echo was from 2022 LVEF 60%, grade 1 diastolic dysfunction status post TAVR no evidence of volume overload  Weight appears stable at baseline  I/O, daily weight  Resume diuretic currently resume daily patient takes every other day        Paroxysmal atrial fibrillation (HCC)  Known history of A-fib  Currently not on beta-blocker  Patient on Coumadin for AC  Holding Coumadin for INR 3.61  INR in a.m.  Influenza A  4-day history of increased shortness of breath not feeling well  Patient out of the window for Tamiflu  Supportive measures  Supratherapeutic INR      VTE Pharmacologic Prophylaxis: VTE Score: 9 High Risk (Score >/= 5) - Pharmacological DVT Prophylaxis Ordered: apixaban (Eliquis). Sequential Compression Devices Ordered.    Mobility:   Basic Mobility Inpatient Raw Score: 18  JH-HLM Goal: 6: Walk 10 steps or more  JH-HLM Achieved: 1: Laying in bed  JH-HLM Goal NOT achieved. Continue with multidisciplinary rounding and encourage appropriate mobility to improve upon JH-HLM goals.    Patient Centered Rounds: I performed bedside rounds with nursing staff today.   Discussions with Specialists or Other Care Team Provider: CM    Education and Discussions with Family / Patient: Updated  (daughter) via phone.    Current Length of Stay: 5 day(s)  Current Patient Status: Inpatient   Certification Statement: The patient will continue to require additional inpatient hospital stay due to additional day receiving IV Solu-Medrol and IV antibiotics  Discharge Plan: Anticipate discharge tomorrow  to home with home services.    Code Status: Level 3 - DNAR and DNI    Subjective   Patient reports to be feeling worse compared to yesterday.  Overall she does feel improvement in her breathing.  Patient reports she has continued wheezing.  She currently denies any chest pain/pressure, palpitations, lightheadedness, or chills.    Objective :  Temp:  [96.4 °F (35.8 °C)-98.9 °F (37.2 °C)] 98.9 °F (37.2 °C)  HR:  [60-61] 60  BP: (124-165)/(62-74) 165/74  SpO2:  [93 %-97 %] 97 %  O2 Device: Nasal cannula  Nasal Cannula O2 Flow Rate (L/min):  [4 L/min] 4 L/min    Body mass index is 43.48 kg/m².     Input and Output Summary (last 24 hours):     Intake/Output Summary (Last 24 hours) at 3/2/2025 1250  Last data filed at 3/2/2025 0549  Gross per 24 hour   Intake 200 ml   Output 1000 ml   Net -800 ml       Physical Exam  Vitals and nursing note reviewed.   Constitutional:       General: She is not in acute distress.     Appearance: She is obese. She is not ill-appearing, toxic-appearing or diaphoretic.   HENT:      Head: Normocephalic.      Nose: Nose normal.      Mouth/Throat:      Mouth: Mucous membranes are moist.      Pharynx: Oropharynx is clear.   Eyes:      General: No scleral icterus.     Conjunctiva/sclera: Conjunctivae normal.      Pupils: Pupils are equal, round, and reactive to light.   Cardiovascular:      Rate and Rhythm: Normal rate and regular rhythm.      Heart sounds: No murmur heard.     No friction rub. No gallop.   Pulmonary:      Effort: Pulmonary effort is normal. No respiratory distress.      Breath sounds: No stridor. Wheezing present. No rhonchi or rales.   Abdominal:      General: Abdomen is flat.      Palpations: Abdomen is soft.   Musculoskeletal:         General: Normal range of motion.      Cervical back: Normal range of motion and neck supple.      Right lower leg: No edema.      Left lower leg: No edema.   Lymphadenopathy:      Cervical: No cervical adenopathy.   Skin:     General: Skin is  warm.      Coloration: Skin is not jaundiced or pale.      Findings: No bruising, erythema or lesion.   Neurological:      General: No focal deficit present.      Mental Status: She is alert and oriented to person, place, and time. Mental status is at baseline.      Cranial Nerves: No cranial nerve deficit.      Motor: No weakness.   Psychiatric:         Mood and Affect: Mood normal.         Behavior: Behavior normal.         Thought Content: Thought content normal.               Lab Results: I have reviewed the following results:   Results from last 7 days   Lab Units 03/02/25  1020 02/26/25  0605 02/25/25  0809   WBC Thousand/uL 11.09*   < > 15.61*   HEMOGLOBIN g/dL 11.4*   < > 12.1   HEMATOCRIT % 37.8   < > 40.2   PLATELETS Thousands/uL 322   < > 298   SEGS PCT %  --   --  93*   LYMPHO PCT % 30  --  3*   MONO PCT % 8  --  4   EOS PCT % 6  --  0    < > = values in this interval not displayed.     Results from last 7 days   Lab Units 03/02/25  1020 02/26/25  0605 02/25/25  0838   SODIUM mmol/L 139   < > 137   POTASSIUM mmol/L 3.9   < > 4.2   CHLORIDE mmol/L 101   < > 104   CO2 mmol/L 31   < > 24   BUN mg/dL 33*   < > 27*   CREATININE mg/dL 0.77   < > 1.01   ANION GAP mmol/L 7   < > 9   CALCIUM mg/dL 9.1   < > 9.2   ALBUMIN g/dL  --   --  3.5   TOTAL BILIRUBIN mg/dL  --   --  0.37   ALK PHOS U/L  --   --  89   ALT U/L  --   --  21   AST U/L  --   --  40*   GLUCOSE RANDOM mg/dL 91   < > 135    < > = values in this interval not displayed.     Results from last 7 days   Lab Units 03/02/25  0544   INR  3.61*             Results from last 7 days   Lab Units 02/28/25  0711 02/27/25  0501 02/26/25  0605 02/25/25  0838   LACTIC ACID mmol/L  --   --   --  2.0   PROCALCITONIN ng/ml 2.93* 6.68* 7.00* 1.42*       Recent Cultures (last 7 days):   Results from last 7 days   Lab Units 02/25/25  0810 02/25/25  0809 02/25/25  0000   BLOOD CULTURE  No Growth After 5 Days. No Growth After 5 Days.  --    LEGIONELLA URINARY ANTIGEN   --    --  Negative       Imaging Results Review: No pertinent imaging studies reviewed.  Other Study Results Review: No additional pertinent studies reviewed.    Last 24 Hours Medication List:     Current Facility-Administered Medications:     acetaminophen (TYLENOL) tablet 650 mg, Q6H PRN    albuterol inhalation solution 2.5 mg, Q4H PRN    aluminum-magnesium hydroxide-simethicone (MAALOX) oral suspension 30 mL, Q4H PRN    aspirin (ECOTRIN LOW STRENGTH) EC tablet 81 mg, Daily    benzonatate (TESSALON PERLES) capsule 100 mg, TID PRN    cefTRIAXone (ROCEPHIN) 2,000 mg in dextrose 5 % 50 mL IVPB, Q24H, Last Rate: 2,000 mg (03/02/25 0823)    furosemide (LASIX) tablet 40 mg, Daily    guaiFENesin (MUCINEX) 12 hr tablet 600 mg, BID    levothyroxine tablet 50 mcg, Daily    methylPREDNISolone sodium succinate (Solu-MEDROL) injection 40 mg, Daily    ondansetron (ZOFRAN) injection 4 mg, Q6H PRN    pantoprazole (PROTONIX) EC tablet 40 mg, Early Morning    pravastatin (PRAVACHOL) tablet 80 mg, Daily With Dinner    pregabalin (LYRICA) capsule 25 mg, BID    sertraline (ZOLOFT) tablet 100 mg, Daily    [Held by provider] warfarin (COUMADIN) tablet 2.5 mg, Once per day on Monday Tuesday Wednesday Thursday Friday Saturday    [Held by provider] warfarin (COUMADIN) tablet 5 mg, Q7 Days    Administrative Statements   Today, Patient Was Seen By: Ector Elizabeth PA-C  I have spent a total time of 35 minutes in caring for this patient on the day of the visit/encounter including Documenting in the medical record, Reviewing/placing orders in the medical record (including tests, medications, and/or procedures), Obtaining or reviewing history  , and Communicating with other healthcare professionals .    **Please Note: This note may have been constructed using a voice recognition system.**

## 2025-03-02 NOTE — ASSESSMENT & PLAN NOTE
Known history of A-fib  Currently not on beta-blocker  Patient on Coumadin for AC  Holding Coumadin for INR 3.61  INR in a.m.

## 2025-03-02 NOTE — ASSESSMENT & PLAN NOTE
Body mass index is 43.48 kg/m².  Recommend incorporating a more whole foods plant-predominant diet along with decreasing consumption of red meats and processed foods  Per AHA guidelines, recommend moderate-vigorous intensity exercise for 30 minutes a day for 5 days a week or a total of 150 min/week

## 2025-03-02 NOTE — ASSESSMENT & PLAN NOTE
Patient on imaging with concern for a left lower lung opacity  CT chest with evidence of bibasilar pneumonia he will air lymph nodes  Patient given IV cefepime in the ED, and transition to ceftriaxone, will continue (received 5 total days of antibiotics, recommending to continue with antibiotics for 10 total days given slow progression improvement)  Strep and Legionella are negative  Procalcitonin elevated at 1.42 repeat 7.00>6.68>2.93  MRSA culture negative  Continue ATC nebulizers  Continue Tessalon Perles as needed and Mucinex  Respiratory protocol  Incentive spirometry

## 2025-03-02 NOTE — ASSESSMENT & PLAN NOTE
Patient meeting sepsis criteria by fever, tachycardia, tachypnea leukocytosis  Continue IV antibiotics  Blood cultures x 2 no growth at 5 days  No evidence of organ dysfunction  Respiratory protocol  CBC BMP in a.m.

## 2025-03-02 NOTE — PLAN OF CARE
Problem: INFECTION - ADULT  Goal: Absence or prevention of progression during hospitalization  Description: INTERVENTIONS:  - Assess and monitor for signs and symptoms of infection  - Monitor lab/diagnostic results  - Monitor all insertion sites, i.e. indwelling lines, tubes, and drains  - Monitor endotracheal if appropriate and nasal secretions for changes in amount and color  - [place] appropriate cooling/warming therapies per order  - Administer medications as ordered  - Instruct and encourage patient and family to use good hand hygiene technique  - Identify and instruct in appropriate isolation precautions for identified infection/condition  Outcome: Progressing     Problem: DISCHARGE PLANNING  Goal: Discharge to home or other facility with appropriate resources  Description: INTERVENTIONS:  - Identify barriers to discharge w/patient and caregiver  - Arrange for needed discharge resources and transportation as appropriate  - Identify discharge learning needs (meds, wound care, etc.)  - Arrange for interpretive services to assist at discharge as needed  - Refer to Case Management Department for coordinating discharge planning if the patient needs post-hospital services based on physician/advanced practitioner order or complex needs related to functional status, cognitive ability, or social support system  Outcome: Progressing     Problem: Knowledge Deficit  Goal: Patient/family/caregiver demonstrates understanding of disease process, treatment plan, medications, and discharge instructions  Description: Complete learning assessment and assess knowledge base.  Interventions:  - Provide teaching at level of understanding  - Provide teaching via preferred learning methods  Outcome: Progressing

## 2025-03-03 ENCOUNTER — TELEPHONE (OUTPATIENT)
Age: 85
End: 2025-03-03

## 2025-03-03 ENCOUNTER — HOSPITAL ENCOUNTER (OUTPATIENT)
Dept: INFUSION CENTER | Facility: CLINIC | Age: 85
Discharge: HOME | End: 2025-03-03

## 2025-03-03 LAB
ANION GAP SERPL CALCULATED.3IONS-SCNC: 6 MMOL/L (ref 4–13)
BUN SERPL-MCNC: 28 MG/DL (ref 5–25)
CALCIUM SERPL-MCNC: 9.4 MG/DL (ref 8.4–10.2)
CHLORIDE SERPL-SCNC: 98 MMOL/L (ref 96–108)
CO2 SERPL-SCNC: 33 MMOL/L (ref 21–32)
CREAT SERPL-MCNC: 0.61 MG/DL (ref 0.6–1.3)
ERYTHROCYTE [DISTWIDTH] IN BLOOD BY AUTOMATED COUNT: 19.9 % (ref 11.6–15.1)
GFR SERPL CREATININE-BSD FRML MDRD: 82 ML/MIN/1.73SQ M
GLUCOSE SERPL-MCNC: 113 MG/DL (ref 65–140)
HCT VFR BLD AUTO: 39.8 % (ref 34.8–46.1)
HGB BLD-MCNC: 11.8 G/DL (ref 11.5–15.4)
MCH RBC QN AUTO: 24.6 PG (ref 26.8–34.3)
MCHC RBC AUTO-ENTMCNC: 29.6 G/DL (ref 31.4–37.4)
MCV RBC AUTO: 83 FL (ref 82–98)
PLATELET # BLD AUTO: 375 THOUSANDS/UL (ref 149–390)
PMV BLD AUTO: 9.5 FL (ref 8.9–12.7)
POTASSIUM SERPL-SCNC: 4.3 MMOL/L (ref 3.5–5.3)
RBC # BLD AUTO: 4.8 MILLION/UL (ref 3.81–5.12)
SODIUM SERPL-SCNC: 137 MMOL/L (ref 135–147)
WBC # BLD AUTO: 10.63 THOUSAND/UL (ref 4.31–10.16)

## 2025-03-03 PROCEDURE — 94761 N-INVAS EAR/PLS OXIMETRY MLT: CPT

## 2025-03-03 PROCEDURE — 80048 BASIC METABOLIC PNL TOTAL CA: CPT

## 2025-03-03 PROCEDURE — 85027 COMPLETE CBC AUTOMATED: CPT

## 2025-03-03 PROCEDURE — 94660 CPAP INITIATION&MGMT: CPT

## 2025-03-03 PROCEDURE — 97163 PT EVAL HIGH COMPLEX 45 MIN: CPT

## 2025-03-03 PROCEDURE — 94760 N-INVAS EAR/PLS OXIMETRY 1: CPT

## 2025-03-03 PROCEDURE — 97530 THERAPEUTIC ACTIVITIES: CPT

## 2025-03-03 PROCEDURE — 99232 SBSQ HOSP IP/OBS MODERATE 35: CPT

## 2025-03-03 RX ORDER — PREDNISONE 20 MG/1
40 TABLET ORAL DAILY
Status: DISCONTINUED | OUTPATIENT
Start: 2025-03-04 | End: 2025-03-04 | Stop reason: HOSPADM

## 2025-03-03 RX ADMIN — ASPIRIN 81 MG: 81 TABLET, COATED ORAL at 10:08

## 2025-03-03 RX ADMIN — SERTRALINE HYDROCHLORIDE 100 MG: 50 TABLET ORAL at 10:08

## 2025-03-03 RX ADMIN — FUROSEMIDE 40 MG: 40 TABLET ORAL at 10:08

## 2025-03-03 RX ADMIN — PRAVASTATIN SODIUM 80 MG: 80 TABLET ORAL at 18:10

## 2025-03-03 RX ADMIN — CEFTRIAXONE SODIUM 2000 MG: 10 INJECTION, POWDER, FOR SOLUTION INTRAVENOUS at 10:07

## 2025-03-03 RX ADMIN — PANTOPRAZOLE SODIUM 40 MG: 40 TABLET, DELAYED RELEASE ORAL at 05:04

## 2025-03-03 RX ADMIN — Medication 1 SPRAY: at 10:07

## 2025-03-03 RX ADMIN — PREGABALIN 25 MG: 25 CAPSULE ORAL at 10:08

## 2025-03-03 RX ADMIN — METHYLPREDNISOLONE SODIUM SUCCINATE 40 MG: 40 INJECTION, POWDER, FOR SOLUTION INTRAMUSCULAR; INTRAVENOUS at 10:08

## 2025-03-03 RX ADMIN — LEVOTHYROXINE SODIUM 50 MCG: 0.05 TABLET ORAL at 05:04

## 2025-03-03 RX ADMIN — GUAIFENESIN 600 MG: 600 TABLET ORAL at 10:08

## 2025-03-03 RX ADMIN — GUAIFENESIN 600 MG: 600 TABLET ORAL at 18:10

## 2025-03-03 RX ADMIN — PREGABALIN 25 MG: 25 CAPSULE ORAL at 18:10

## 2025-03-03 NOTE — PROGRESS NOTES
Patient:  MAURISIO ELENA    MRN:  [id number]    Aidin Request ID:  [id number]    Level of care reserved:  Skilled Nursing Facility    Partner Reserved:  [facility], [place] [address] [phone]    Clinical needs requested:    Geography searched:  20 miles around [id number]    Start of Service:    Request sent:  12:47pm EST on 3/3/2025 by Estefanía Jones    Partner reserved:  2:03pm EST on 3/3/2025 by Estefanía Jones    Choice list shared:  2:03pm EST on 3/3/2025 by Estefanía Jones

## 2025-03-03 NOTE — PLAN OF CARE
Problem: PAIN - ADULT  Goal: Verbalizes/displays adequate comfort level or baseline comfort level  Description: Interventions:  - Encourage patient to monitor pain and request assistance  - Assess pain using appropriate pain scale  - Administer analgesics based on type and severity of pain and evaluate response  - Implement non-pharmacological measures as appropriate and evaluate response  - Consider cultural and social influences on pain and pain management  - Notify physician/advanced practitioner if interventions unsuccessful or patient reports new pain  Outcome: Progressing     Problem: INFECTION - ADULT  Goal: Absence or prevention of progression during hospitalization  Description: INTERVENTIONS:  - Assess and monitor for signs and symptoms of infection  - Monitor lab/diagnostic results  - Monitor all insertion sites, i.e. indwelling lines, tubes, and drains  - Monitor endotracheal if appropriate and nasal secretions for changes in amount and color  - [place] appropriate cooling/warming therapies per order  - Administer medications as ordered  - Instruct and encourage patient and family to use good hand hygiene technique  - Identify and instruct in appropriate isolation precautions for identified infection/condition  Outcome: Progressing     Problem: SAFETY ADULT  Goal: Patient will remain free of falls  Description: INTERVENTIONS:  - Educate patient/family on patient safety including physical limitations  - Instruct patient to call for assistance with activity   - Consult OT/PT to assist with strengthening/mobility   - Keep Call bell within reach  - Keep bed low and locked with side rails adjusted as appropriate  - Keep care items and personal belongings within reach  - Initiate and maintain comfort rounds  - Make Fall Risk Sign visible to staff  - Offer Toileting every 2 Hours, in advance of need  - Initiate/Maintain bed alarm  - Obtain necessary fall risk management equipment  - Apply yellow socks and  bracelet for high fall risk patients  - Consider moving patient to room near nurses station  Outcome: Progressing  Goal: Maintain or return to baseline ADL function  Description: INTERVENTIONS:  -  Assess patient's ability to carry out ADLs; assess patient's baseline for ADL function and identify physical deficits which impact ability to perform ADLs (bathing, care of mouth/teeth, toileting, grooming, dressing, etc.)  - Assess/evaluate cause of self-care deficits   - Assess range of motion  - Assess patient's mobility; develop plan if impaired  - Assess patient's need for assistive devices and provide as appropriate  - Encourage maximum independence but intervene and supervise when necessary  - Involve family in performance of ADLs  - Assess for home care needs following discharge   - Consider OT consult to assist with ADL evaluation and planning for discharge  - Provide patient education as appropriate  Outcome: Progressing  Goal: Maintains/Returns to pre admission functional level  Description: INTERVENTIONS:  - Perform AM-PAC 6 Click Basic Mobility/ Daily Activity assessment daily.  - Set and communicate daily mobility goal to care team and patient/family/caregiver.   - Collaborate with rehabilitation services on mobility goals if consulted  - Perform Range of Motion 3 times a day.  - Reposition patient every 2 hours.  - Dangle patient 3 times a day  - Stand patient 3 times a day  - Ambulate patient 3 times a day  - Out of bed to chair 3 times a day   - Out of bed for meals 3 times a day  - Out of bed for toileting  - Record patient progress and toleration of activity level   Outcome: Progressing     Problem: DISCHARGE PLANNING  Goal: Discharge to home or other facility with appropriate resources  Description: INTERVENTIONS:  - Identify barriers to discharge w/patient and caregiver  - Arrange for needed discharge resources and transportation as appropriate  - Identify discharge learning needs (meds, wound care,  etc.)  - Arrange for interpretive services to assist at discharge as needed  - Refer to Case Management Department for coordinating discharge planning if the patient needs post-hospital services based on physician/advanced practitioner order or complex needs related to functional status, cognitive ability, or social support system  Outcome: Progressing     Problem: Knowledge Deficit  Goal: Patient/family/caregiver demonstrates understanding of disease process, treatment plan, medications, and discharge instructions  Description: Complete learning assessment and assess knowledge base.  Interventions:  - Provide teaching at level of understanding  - Provide teaching via preferred learning methods  Outcome: Progressing     Problem: Prexisting or High Potential for Compromised Skin Integrity  Goal: Skin integrity is maintained or improved  Description: INTERVENTIONS:  - Identify patients at risk for skin breakdown  - Assess and monitor skin integrity  - Assess and monitor nutrition and hydration status  - Monitor labs   - Assess for incontinence   - Turn and reposition patient  - Assist with mobility/ambulation  - Relieve pressure over bony prominences  - Avoid friction and shearing  - Provide appropriate hygiene as needed including keeping skin clean and dry  - Evaluate need for skin moisturizer/barrier cream  - Collaborate with interdisciplinary team   - Patient/family teaching  - Consider wound care consult   Outcome: Progressing

## 2025-03-03 NOTE — TELEPHONE ENCOUNTER
[facility] calling about unsigned home health orders from January. Need to be signed by Diya Schrader NP. Provided caller with fax number for office. Once received and signed, please send back to [facility] at fax: [phone]

## 2025-03-03 NOTE — PROGRESS NOTES
Patient:  MAURISIO ELENA    MRN:  [id number]    Aidin Request ID:  [id number]    Level of care reserved:  Skilled Nursing Facility    Partner Reserved:  [facility], [place] [address] [phone]    Clinical needs requested:    Geography searched:  20 miles around [id number]    Start of Service:    Request sent:  12:47pm EST on 3/3/2025 by Estefanía Jones    Partner reserved:  2:03pm EST on 3/3/2025 by Estefanía Jones    Choice list shared:  2:03pm EST on 3/3/2025 by Estefanía Jones

## 2025-03-03 NOTE — ASSESSMENT & PLAN NOTE
Known history of A-fib  Currently not on beta-blocker  Patient on Coumadin for AC  Holding Coumadin for INR 3.61, will recheck in a.m.

## 2025-03-03 NOTE — PROGRESS NOTES
Patient:  MAURISIO ELENA    MRN:  [id number]    Aidin Request ID:  [id number]    Level of care reserved:  Skilled Nursing Facility    Partner Reserved:  [facility], [place] [address] [phone]    Clinical needs requested:    Geography searched:  20 miles around [phone]    Start of Service:    Request sent:  12:47pm EST on 3/3/2025 by Estefanía Jones    Partner reserved:  2:03pm EST on 3/3/2025 by Estefanía Jones    Choice list shared:  2:03pm EST on 3/3/2025 by Estefanía Jones

## 2025-03-03 NOTE — PLAN OF CARE
Problem: PHYSICAL THERAPY ADULT  Goal: Performs mobility at highest level of function for planned discharge setting.  See evaluation for individualized goals.  Description: Treatment/Interventions: Functional transfer training, LE strengthening/ROM, Therapeutic exercise, Endurance training, Patient/family training, Bed mobility, Equipment eval/education, Gait training, Continued evaluation, Spoke to nursing, OT  Equipment Recommended: Walker       See flowsheet documentation for full assessment, interventions and recommendations.  Note: Prognosis: Good  Problem List: Decreased strength, Decreased endurance, Impaired balance, Decreased mobility  Assessment: Marisol Otoole is a 84 y.o. female admitted to [facility] on 2/25/2025 for Pneumonia, sepsis, acute and chronic respiratory, influenza A . Pt  has a past medical history of Anemia (08/22/2018), Anxiety, Arthritis, AVB (atrioventricular block), Cataract, CHF (congestive heart failure) (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), Coronary artery disease, Dislocation of right shoulder joint, Frequent UTI, GERD (gastroesophageal reflux disease), H/O: pneumonia, Heme positive stool, Hyperlipidemia, Hypertension, Hypothyroidism, Morbid obesity with BMI of 50.0-59.9, adult ([facility]), Obesity, morbid ([facility]) (08/22/2018), JANICE on CPAP, Pulmonary hypertension ([facility]) (08/22/2018), Severe aortic stenosis, Simple goiter, Skin cyst, and Wears glasses.. Order placed for PT eval and tx. PT was consulted and pt was seen on 3/3/2025 for mobility assessment and d/c planning. Chart review and two person identifiers were completed.   Currently pt presents with decreased strength , decreased static sitting balance , decreased dynamic sitting balance , decreased static standing balance, decreased dynamic standing balance , decreased gait speed, decreased step length , decreased muscular endurance , and decreased cardiovascular endurance . Due to these impairments, they will require assistance to perform bed mobility,  sit to stand , ambulation, stair negotiation, and transfers. Pt is currently functioning at a supervision assistance x1 level for bed mobility, minimum assistance x1 level for transfers, minimum assistance x1 level for ambulation with Rolling Walker. These activity limitations significantly impact their ability to participate in previous home and community roles and responsibilities  and ambulation in home. The patient's AM-PAC Basic Mobility Inpatient Short Form Raw Score is 17. PT recommends level II moderate resource intensity. They will benefit from skilled therapy to to reduce the risk of falls, to allow for safe ambulation, and to maximize functional potential.  Barriers to Discharge: Inaccessible home environment, Decreased caregiver support, Other (Comment) (decline in functional mobility)     Rehab Resource Intensity Level, PT: II (Moderate Resource Intensity)    See flowsheet documentation for full assessment.

## 2025-03-03 NOTE — CASE MANAGEMENT
Case Management Discharge Planning Note    Patient name Marisol Otoole  Location /-01 MRN [id number]  : 1940 Date 3/3/2025       Current Admission Date: 2025  Current Admission Diagnosis:Pneumonia   Patient Active Problem List    Diagnosis Date Noted Date Diagnosed    Supratherapeutic INR 2025     Sepsis ([facility]) 2025     Influenza A 2025     Epistaxis 2025     SSS (sick sinus syndrome) ([facility]) 2024     Cardiac pacemaker 2024     Non-rheumatic aortic stenosis 2024     Nonrheumatic tricuspid valve regurgitation 2024     Presence of permanent cardiac pacemaker 2024     Paroxysmal atrial fibrillation ([facility]) 2024     Junctional bradycardia 2024     Anticoagulant long-term use 2024     Urinary frequency 2024     At risk for injury related to fall 2024     Walker as ambulation aid 2024     Ambulatory dysfunction 10/24/2023     Fall 2023     Leucocytosis 2023     Orbital mass 2023     Radiculopathy, lumbar region 07/10/2021     Acute and chronic respiratory failure with hypoxia ([facility]) 06/10/2021     Depression, recurrent ([facility]) 2021     Osteopenia 10/22/2020     Insomnia 2020     History of transcatheter aortic valve replacement (TAVR) 10/09/2018     Primary osteoarthritis of left shoulder      AVB (atrioventricular block)      Chronic heart failure with preserved ejection fraction (HFpEF) ([facility])      COPD, mild ([facility])      Coronary artery disease involving native coronary artery of native heart without angina pectoris      JANICE on CPAP      Gastroesophageal reflux disease without esophagitis 2018     Iron deficiency anemia secondary to inadequate dietary iron intake 2018     Morbid obesity due to excess calories ([facility]) 2018     JANICE (obstructive sleep apnea) 2018     Hyperlipidemia 2017     Primary hypertension 2017     Pneumonia 2017     GERD  (gastroesophageal reflux disease) 10/29/2017     Acquired hypothyroidism 10/29/2017     LVH (left ventricular hypertrophy) 09/22/2016     Mitral annular calcification 09/22/2016     Mitral valve stenosis 09/22/2016     Pulmonary hypertension (HCC) 09/22/2016       LOS (days): 6  Geometric Mean LOS (GMLOS) (days): 4.9  Days to GMLOS:-1.2     OBJECTIVE:  Risk of Unplanned Readmission Score: 25.57         Current admission status: Inpatient   Preferred Pharmacy:   [id number]  [place] - [address]  [place] [address]  Phone: [phone] Fax: [phone]    Primary Care Provider: MABEL Hallman    Primary Insurance: MEDICARE  Secondary Insurance:     DISCHARGE DETAILS:    IMM Given (Date):: 03/03/25 (IMM reviewed with pt at bedside. Pt verbalized understanding of appeal rights. IMM placed in scan bin.)     Family notified:: CM spoke with both Angeles and Yuridia via phone calls. Both would like pt to go to STR prior to returning home as they have concerns that pt is not as strong as she needs to be to be able return home safely.  Additional Comments: CM met with pt at bedside and informed of STR rec. Pt reported she didn't want STR, but knowns Yuridia and her roommate, Angeles, will want her to go to STR. Pt agreeable to STR referrals being made at this time.     ADDENDUM 2:07pm: CM reviewed STR choice list with pt and she chose [facility]. [facility]. PT/OT notes not in chart at this time and [facility] won't be able to accept pt until tomorrow as they need PT/OT notes in and would need patient their by 6pm. Pt will go to [facility] tomorrow. CM spoke with pt's friend, Angeles, via phone call who is agreeable to bringing pt's home CPAP to pt at [facility] tomorrow.

## 2025-03-03 NOTE — ASSESSMENT & PLAN NOTE
Patient is CPAP and O2 dependent at night only  Patient upon admission to be desatting in ED and 70s requiring BiPAP she was weaned to 4 L nasal cannula patient's baseline is 2 L (although patient reports she wears oxygen intermittently, however she also reports her baseline oxygen saturation is high 80s at baseline)   Continue to wean oxygen maintaining sats greater than 90%  Home O2 eval recommending 4 L home O2  Started prednisone taper  Respiratory protocol

## 2025-03-03 NOTE — PROGRESS NOTES
Progress Note - Hospitalist   Name: Marisol Otoole 84 y.o. female I MRN: [id number]  Unit/Bed#: -01 I Date of Admission: 2/25/2025   Date of Service: 3/3/2025 I Hospital Day: 6    Assessment & Plan  Pneumonia  Patient on imaging with concern for a left lower lung opacity  CT chest with evidence of bibasilar pneumonia he will air lymph nodes  Patient given IV cefepime in the ED, and transition to ceftriaxone, will continue (received 6 total days of antibiotics, recommending to continue with antibiotics for 10 total days given slow progression improvement)  Strep and Legionella are negative  Procalcitonin elevated at 1.42 repeat 7.00>6.68>2.93  MRSA culture negative  Continue ATC nebulizers  Continue Tessalon Perles as needed and Mucinex  Respiratory protocol  Incentive spirometry  Sepsis (HCC)  Patient meeting sepsis criteria by fever, tachycardia, tachypnea leukocytosis  Continue IV antibiotics  Blood cultures x 2 no growth at 5 days  No evidence of organ dysfunction  Respiratory protocol  CBC BMP in a.m.  Acute and chronic respiratory failure with hypoxia (HCC)  Patient is CPAP and O2 dependent at night only  Patient upon admission to be desatting in ED and 70s requiring BiPAP she was weaned to 4 L nasal cannula patient's baseline is 2 L (although patient reports she wears oxygen intermittently, however she also reports her baseline oxygen saturation is high 80s at baseline)   Continue to wean oxygen maintaining sats greater than 90%  Home O2 eval recommending 4 L home O2  Started prednisone taper  Respiratory protocol  GERD (gastroesophageal reflux disease)  Continue PPI  Acquired hypothyroidism  Continue levothyroxine  Primary hypertension  Blood pressure soft on admission improved with IV fluids  Continue to monitor routine vitals per unit  JANICE (obstructive sleep apnea)  Continue BiPAP nightly  Morbid obesity due to excess calories (HCC)  Body mass index is 43.22 kg/m².  Recommend incorporating a more whole  foods plant-predominant diet along with decreasing consumption of red meats and processed foods  Per AHA guidelines, recommend moderate-vigorous intensity exercise for 30 minutes a day for 5 days a week or a total of 150 min/week    Chronic heart failure with preserved ejection fraction (HFpEF) (HCC)  Wt Readings from Last 3 Encounters:   03/03/25 81.3 kg (179 lb 3.7 oz)   01/31/25 79.8 kg (175 lb 14.8 oz)   01/29/25 79.8 kg (175 lb 14.8 oz)     Last echo was from 2022 LVEF 60%, grade 1 diastolic dysfunction status post TAVR no evidence of volume overload  Weight appears stable at baseline  I/O, daily weight  Resume diuretic currently resume daily patient takes every other day        Paroxysmal atrial fibrillation (HCC)  Known history of A-fib  Currently not on beta-blocker  Patient on Coumadin for AC  Holding Coumadin for INR 3.61, will recheck in a.m.  Influenza A  4-day history of increased shortness of breath not feeling well  Patient out of the window for Tamiflu  Supportive measures  Supratherapeutic INR      VTE Pharmacologic Prophylaxis: VTE Score: 9 High Risk (Score >/= 5) - Pharmacological DVT Prophylaxis Ordered: warfarin (Coumadin). Sequential Compression Devices Ordered.    Mobility:   Basic Mobility Inpatient Raw Score: 17  JH-HLM Goal: 5: Stand one or more mins  JH-HLM Achieved: 7: Walk 25 feet or more  JH-HLM Goal achieved. Continue to encourage appropriate mobility.    Patient Centered Rounds: I performed bedside rounds with nursing staff today.   Discussions with Specialists or Other Care Team Provider: CM    Education and Discussions with Family / Patient: Updated  (friend) via phone.    Current Length of Stay: 6 day(s)  Current Patient Status: Inpatient   Certification Statement: The patient will continue to require additional inpatient hospital stay due to awaiting safe dispo  Discharge Plan: Anticipate discharge in 24-48 hrs to rehab facility.    Code Status: Level 3 - DNAR and  DNI    Subjective   Patient reports to be feeling improved and like to be discharged home, however after PT/OT ultimately decided to go to short-term rehab.  Reports improvement in shortness of breath and no longer having chills/fevers.  Currently denies any chest pain/pressure, palpitations, lightheadedness, nausea    Objective :  Temp:  [97.3 °F (36.3 °C)-98.5 °F (36.9 °C)] 97.3 °F (36.3 °C)  HR:  [60-74] 73  BP: (128-161)/(65-78) 147/65  Resp:  [18] 18  SpO2:  [86 %-93 %] 89 %  O2 Device: Nasal cannula  Nasal Cannula O2 Flow Rate (L/min):  [4 L/min] 4 L/min    Body mass index is 43.22 kg/m².     Input and Output Summary (last 24 hours):     Intake/Output Summary (Last 24 hours) at 3/3/2025 1451  Last data filed at 3/2/2025 2215  Gross per 24 hour   Intake 480 ml   Output 1175 ml   Net -695 ml       Physical Exam  Vitals and nursing note reviewed.   Constitutional:       General: She is not in acute distress.     Appearance: She is obese. She is not ill-appearing, toxic-appearing or diaphoretic.   HENT:      Head: Normocephalic.      Nose: Nose normal.      Mouth/Throat:      Mouth: Mucous membranes are moist.      Pharynx: Oropharynx is clear.   Eyes:      General: No scleral icterus.     Conjunctiva/sclera: Conjunctivae normal.      Pupils: Pupils are equal, round, and reactive to light.   Cardiovascular:      Rate and Rhythm: Normal rate and regular rhythm.      Heart sounds: No murmur heard.     No friction rub. No gallop.   Pulmonary:      Effort: Pulmonary effort is normal. No respiratory distress.      Breath sounds: No stridor. Wheezing and rhonchi (Improves with expectoration) present. No rales.   Abdominal:      General: Abdomen is flat.      Palpations: Abdomen is soft.   Musculoskeletal:         General: Normal range of motion.      Cervical back: Normal range of motion and neck supple.      Right lower leg: No edema.      Left lower leg: No edema.   Lymphadenopathy:      Cervical: No cervical  adenopathy.   Skin:     General: Skin is warm.      Coloration: Skin is not jaundiced or pale.      Findings: No bruising, erythema or lesion.   Neurological:      General: No focal deficit present.      Mental Status: She is alert and oriented to person, place, and time. Mental status is at baseline.      Cranial Nerves: No cranial nerve deficit.      Motor: No weakness.   Psychiatric:         Mood and Affect: Mood normal.         Behavior: Behavior normal.         Thought Content: Thought content normal.                   Lab Results: I have reviewed the following results:   Results from last 7 days   Lab Units 03/03/25  0439 03/02/25  1020 02/26/25  0605 02/25/25  0809   WBC Thousand/uL 10.63* 11.09*   < > 15.61*   HEMOGLOBIN g/dL 11.8 11.4*   < > 12.1   HEMATOCRIT % 39.8 37.8   < > 40.2   PLATELETS Thousands/uL 375 322   < > 298   SEGS PCT %  --   --   --  93*   LYMPHO PCT %  --  30  --  3*   MONO PCT %  --  8  --  4   EOS PCT %  --  6  --  0    < > = values in this interval not displayed.     Results from last 7 days   Lab Units 03/03/25  0439 02/26/25  0605 02/25/25  0838   SODIUM mmol/L 137   < > 137   POTASSIUM mmol/L 4.3   < > 4.2   CHLORIDE mmol/L 98   < > 104   CO2 mmol/L 33*   < > 24   BUN mg/dL 28*   < > 27*   CREATININE mg/dL 0.61   < > 1.01   ANION GAP mmol/L 6   < > 9   CALCIUM mg/dL 9.4   < > 9.2   ALBUMIN g/dL  --   --  3.5   TOTAL BILIRUBIN mg/dL  --   --  0.37   ALK PHOS U/L  --   --  89   ALT U/L  --   --  21   AST U/L  --   --  40*   GLUCOSE RANDOM mg/dL 113   < > 135    < > = values in this interval not displayed.     Results from last 7 days   Lab Units 03/02/25  0544   INR  3.61*             Results from last 7 days   Lab Units 02/28/25  0711 02/27/25  0501 02/26/25  0605 02/25/25  0838   LACTIC ACID mmol/L  --   --   --  2.0   PROCALCITONIN ng/ml 2.93* 6.68* 7.00* 1.42*       Recent Cultures (last 7 days):   Results from last 7 days   Lab Units 02/25/25  0810 02/25/25  0809 02/25/25  0000    BLOOD CULTURE  No Growth After 5 Days. No Growth After 5 Days.  --    LEGIONELLA URINARY ANTIGEN   --   --  Negative       Imaging Results Review: No pertinent imaging studies reviewed.  Other Study Results Review: No additional pertinent studies reviewed.    Last 24 Hours Medication List:     Current Facility-Administered Medications:     acetaminophen (TYLENOL) tablet 650 mg, Q6H PRN    albuterol inhalation solution 2.5 mg, Q4H PRN    aluminum-magnesium hydroxide-simethicone (MAALOX) oral suspension 30 mL, Q4H PRN    aspirin (ECOTRIN LOW STRENGTH) EC tablet 81 mg, Daily    benzonatate (TESSALON PERLES) capsule 100 mg, TID PRN    cefTRIAXone (ROCEPHIN) 2,000 mg in dextrose 5 % 50 mL IVPB, Q24H, Last Rate: 2,000 mg (03/03/25 [facility])    furosemide (LASIX) tablet 40 mg, Daily    guaiFENesin (MUCINEX) 12 hr tablet 600 mg, BID    levothyroxine tablet 50 mcg, Daily    ondansetron (ZOFRAN) injection 4 mg, Q6H PRN    pantoprazole (PROTONIX) EC tablet 40 mg, Early Morning    phenol (CHLORASEPTIC) 1.4 % mucosal liquid 1 spray, Q2H PRN    pravastatin (PRAVACHOL) tablet 80 mg, Daily With Dinner    predniSONE tablet 40 mg, Daily    pregabalin (LYRICA) capsule 25 mg, BID    sertraline (ZOLOFT) tablet 100 mg, Daily    [Held by provider] warfarin (COUMADIN) tablet 2.5 mg, Once per day on Monday Tuesday Wednesday Thursday Friday Saturday    [Held by provider] warfarin (COUMADIN) tablet 5 mg, Q7 Days    Administrative Statements   Today, Patient Was Seen By: Ector Elizabeth PA-C  I have spent a total time of 35 minutes in caring for this patient on the day of the visit/encounter including Documenting in the medical record, Reviewing/placing orders in the medical record (including tests, medications, and/or procedures), Obtaining or reviewing history  , and Communicating with other healthcare professionals .    **Please Note: This note may have been constructed using a voice recognition system.**

## 2025-03-03 NOTE — PROGRESS NOTES
Patient:  MAURISIO ELENA    MRN:  [id number]    Aidin Request ID:  [id number]    Level of care reserved:  Skilled Nursing Facility    Partner Reserved:  [facility], [place] [address] [phone]    Clinical needs requested:    Geography searched:  20 miles around [phone]    Start of Service:    Request sent:  12:47pm EST on 3/3/2025 by Estefanía Jones    Partner reserved:  2:03pm EST on 3/3/2025 by Estefanía Jones    Choice list shared:  2:03pm EST on 3/3/2025 by Estefanía Jones

## 2025-03-03 NOTE — TELEPHONE ENCOUNTER
There are forms in patient chart from January that were signed and faxed then, can we find out which forms they are looking for DOS for them and we can refax form if they daina back, would call but no name or contact number provided

## 2025-03-03 NOTE — ASSESSMENT & PLAN NOTE
Wt Readings from Last 3 Encounters:   03/03/25 81.3 kg (179 lb 3.7 oz)   01/31/25 79.8 kg (175 lb 14.8 oz)   01/29/25 79.8 kg (175 lb 14.8 oz)     Last echo was from 2022 LVEF 60%, grade 1 diastolic dysfunction status post TAVR no evidence of volume overload  Weight appears stable at baseline  I/O, daily weight  Resume diuretic currently resume daily patient takes every other day

## 2025-03-03 NOTE — PHYSICAL THERAPY NOTE
Physical Therapy Evaluation    Patient's Name: Marisol Otoole    Admitting Diagnosis  Pneumonia [J18.9]  SOB (shortness of breath) [R06.02]  Influenza A [J10.1]  Hypoxia [R09.02]    Problem List  Patient Active Problem List   Diagnosis    GERD (gastroesophageal reflux disease)    Acquired hypothyroidism    Primary hypertension    Pneumonia    Hyperlipidemia    JANICE (obstructive sleep apnea)    LVH (left ventricular hypertrophy)    Mitral annular calcification    Mitral valve stenosis    Pulmonary hypertension (HCC)    Iron deficiency anemia secondary to inadequate dietary iron intake    Morbid obesity due to excess calories (HCC)    Gastroesophageal reflux disease without esophagitis    Primary osteoarthritis of left shoulder    AVB (atrioventricular block)    Chronic heart failure with preserved ejection fraction (HFpEF) ([facility])    COPD, mild (HCC)    Coronary artery disease involving native coronary artery of native heart without angina pectoris    JANICE on CPAP    History of transcatheter aortic valve replacement (TAVR)    Insomnia    Osteopenia    Depression, recurrent (HCC)    Acute and chronic respiratory failure with hypoxia ([facility])    Radiculopathy, lumbar region    Orbital mass    Fall    Leucocytosis    Ambulatory dysfunction    At risk for injury related to fall    Walker as ambulation aid    Urinary frequency    Junctional bradycardia    Anticoagulant long-term use    Paroxysmal atrial fibrillation (HCC)    Presence of permanent cardiac pacemaker    SSS (sick sinus syndrome) ([facility])    Cardiac pacemaker    Non-rheumatic aortic stenosis    Nonrheumatic tricuspid valve regurgitation    Epistaxis    Sepsis ([facility])    Influenza A    Supratherapeutic INR       Past Medical History  Past Medical History:   Diagnosis Date    Anemia 08/22/2018    Anxiety     Arthritis     AVB (atrioventricular block)     first degree    Cataract     CHF (congestive heart failure) ([facility])     COPD, mild (HCC)     Coronary artery disease      Dislocation of right shoulder joint     Frequent UTI     GERD (gastroesophageal reflux disease)     H/O: pneumonia     Heme positive stool     Hyperlipidemia     Hypertension     Hypothyroidism     Morbid obesity with BMI of 50.0-59.9, adult (HCC)     Obesity, morbid ([facility]) 08/22/2018    JANICE on CPAP     Pulmonary hypertension ([facility]) 08/22/2018    Severe aortic stenosis     Simple goiter     Skin cyst     within the armpits, right    Wears glasses        Past Surgical History  Past Surgical History:   Procedure Laterality Date    BREAST BIOPSY      CARDIAC CATHETERIZATION      CARDIAC ELECTROPHYSIOLOGY PROCEDURE N/A 8/12/2024    Procedure: Cardiac pacer implant;  Surgeon: Clarke Zuluaga MD;  Location: [facility];  Service: Cardiology    CARPAL TUNNEL RELEASE Bilateral     CHOLECYSTECTOMY      DILATION AND CURETTAGE OF UTERUS      HYSTEROSCOPY      MASTOID SURGERY      FL COLONOSCOPY FLX DX W/COLLJ SPEC WHEN PFRMD N/A 9/6/2018    Procedure: COLONOSCOPY;  Surgeon: Shree Sosa III, MD;  Location: [facility];  Service: Gastroenterology    FL ECHO TRANSESOPHAG R-T 2D W/PRB IMG ACQUISJ I&R N/A 10/9/2018    Procedure: INTRA-OP TRANSESOPHAGEAL ECHOCARDIOGRAM (GARRISON);  Surgeon: Kushal Camarena DO;  Location:  MAIN OR;  Service: Cardiac Surgery    FL ESOPHAGOGASTRODUODENOSCOPY TRANSORAL DIAGNOSTIC N/A 8/31/2018    Procedure: ESOPHAGOGASTRODUODENOSCOPY (EGD);  Surgeon: Shree Sosa III, MD;  Location: [facility];  Service: Gastroenterology    FL REPLACE AORTIC VALVE OPENFEMORAL ARTERY APPROACH N/A 10/9/2018    Procedure: REPLACEMENT AORTIC VALVE TRANSCATHETER (TAVR) TRANSFEMORAL W/ 23 MM MENDOZA NOE S3 VALVE (ACCESS OF LEFT);  Surgeon: Kushal Camarena DO;  Location: [facility] MAIN [facility];  Service: Cardiac Surgery    TOTAL HIP ARTHROPLASTY Left 2007    TOTAL KNEE ARTHROPLASTY Bilateral        Recent Imaging  CT chest wo contrast   Final Result by Fernando Vásquez MD (02/25 1917)      Bibasilar consolidation left  greater than right consistent with multilobar pneumonia. Reactive mediastinal nodes.      Small left and trace right pleural effusion.      Stable cardiomegaly.      Stable hepatomegaly.               Workstation performed: [id number]         XR chest portable   Final Result by Eliane Chang MD (02/25 0903)      Technically limited study.   Suspected airspace opacity at the left lung base with possible small left pleural effusion. Recommend PA and lateral views in full inspiration for better assessment.            Workstation performed: [id number]             Recent Vital Signs  Vitals:    03/03/25 0444 03/03/25 0805 03/03/25 1031 03/03/25 1032   BP:  147/65     BP Location:  Left arm     Pulse:  60 74 73   Resp:  18     Temp:  (!) 97.3 °F (36.3 °C)     TempSrc:  Oral     SpO2:  (!) 86% (!) 87% (!) 89%   Weight: 81.3 kg (179 lb 3.7 oz)      Height:              03/03/25 1013   PT Last Visit   PT Visit Date 03/03/25   Note Type   Note type Evaluation   Pain Assessment   Pain Assessment Tool 0-10   Pain Score No Pain   Restrictions/Precautions   Weight Bearing Precautions Per Order No   Other Precautions Chair Alarm;Bed Alarm;Fall Risk;O2  (4L via NC)   Home Living   Type of Home House   Home Layout One level;Ramped entrance;Performs ADLs on one level;Able to live on main level with bedroom/bathroom  (0 REGINO)   Bathroom Shower/Tub Walk-in shower   Bathroom Toilet Standard   Bathroom Equipment Grab bars in shower;Shower chair   Bathroom Accessibility Accessible   Home Equipment Walker;Cane  (rollator)   Additional Comments Pt uses RW at baseline, pt reports using 2L O2 at night, RA during the day   Prior Function   Level of [place] Independent with ADLs;Independent with functional mobility;Independent with IADLS   Lives With Friend(s)   Receives Help From Friend(s)   IADLs Family/Friend/Other provides transportation;Family/Friend/Other provides meals;Independent with medication management   Falls in the last 6  months 0   General   Family/Caregiver Present No   Cognition   Overall Cognitive Status WFL   Orientation Level Oriented X4   Memory Within functional limits   Following Commands Follows one step commands without difficulty   Comments Pt agreeable to PT evaluation and treatment   RLE Assessment   RLE Assessment WFL  (3+/5)   LLE Assessment   LLE Assessment WFL  (3+/5)   Vision-Basic Assessment   Current Vision Wears glasses all the time   Light Touch   RLE Light Touch Grossly intact   LLE Light Touch Grossly intact   Bed Mobility   Supine to Sit 5  Supervision   Additional items Assist x 1;Bedrails;Increased time required;Verbal cues;HOB elevated   Transfers   Sit to Stand 4  Minimal assistance   Additional items Assist x 1;Bedrails;Increased time required;Verbal cues   Stand to Sit 4  Minimal assistance   Additional items Assist x 1;Armrests;Increased time required;Verbal cues   Toilet transfer 4  Minimal assistance   Additional items Assist x 1;Increased time required;Verbal cues;Standard toilet  (grab bars)   Additional Comments with RW   Ambulation/Elevation   Gait pattern Decreased foot clearance;Shuffling;Short stride;Excessively slow;Step to;Decreased toe off;Decreased heel strike;Decreased hip extension   Gait Assistance 4  Minimal assist   Additional items Assist x 1;Verbal cues   Assistive Device Rolling walker   Distance 70', 15'   Balance   Static Sitting Fair +   Dynamic Sitting Fair +   Static Standing Fair   Dynamic Standing Fair -   Ambulatory Fair -   Endurance Deficit   Endurance Deficit Yes   Endurance Deficit Description decreased activity tolerance   Activity Tolerance   Activity Tolerance Patient tolerated treatment well;Treatment limited secondary to medical complications (Comment)  ( SPO2 with ambulation; RN at bedside during session completing ambulatory O2 test. SPO2 decreased 84 during ambulation on RA, recovered to low 90s with 4L O2)   Nurse Made Aware Spoke to RN   Assessment    Prognosis Good   Problem List Decreased strength;Decreased endurance;Impaired balance;Decreased mobility   Assessment Marisol Otoole is a 84 y.o. female admitted to [facility] on 2/25/2025 for Pneumonia, sepsis, acute and chronic respiratory, influenza A . Pt  has a past medical history of Anemia (08/22/2018), Anxiety, Arthritis, AVB (atrioventricular block), Cataract, CHF (congestive heart failure) ([facility]), COPD, mild ([facility]), Coronary artery disease, Dislocation of right shoulder joint, Frequent UTI, GERD (gastroesophageal reflux disease), H/O: pneumonia, Heme positive stool, Hyperlipidemia, Hypertension, Hypothyroidism, Morbid obesity with BMI of 50.0-59.9, adult ([facility]), Obesity, morbid ([facility]) (08/22/2018), JANICE on CPAP, Pulmonary hypertension ([facility]) (08/22/2018), Severe aortic stenosis, Simple goiter, Skin cyst, and Wears glasses.. Order placed for PT eval and tx. PT was consulted and pt was seen on 3/3/2025 for mobility assessment and d/c planning. Chart review and two person identifiers were completed.   Currently pt presents with decreased strength , decreased static sitting balance , decreased dynamic sitting balance , decreased static standing balance, decreased dynamic standing balance , decreased gait speed, decreased step length , decreased muscular endurance , and decreased cardiovascular endurance . Due to these impairments, they will require assistance to perform bed mobility, sit to stand , ambulation, stair negotiation, and transfers. Pt is currently functioning at a supervision assistance x1 level for bed mobility, minimum assistance x1 level for transfers, minimum assistance x1 level for ambulation with Rolling Walker. These activity limitations significantly impact their ability to participate in previous home and community roles and responsibilities  and ambulation in home. The patient's AM-PAC Basic Mobility Inpatient Short Form Raw Score is 17. PT recommends level II moderate resource intensity. They will  "benefit from skilled therapy to to reduce the risk of falls, to allow for safe ambulation, and to maximize functional potential.   Barriers to Discharge Inaccessible home environment;Decreased caregiver support;Other (Comment)  (decline in functional mobility)   Goals   STG Expiration Date 03/13/25   Short Term Goal #1 Within 10 days patient will complete: 1) Bed mobility skills with modified independent assistance to facilitate safe return to previous living environment 2) Functional transfers with modified independent assistance to facilitate safe return to previous living environment  3) Ambulation with least restrictive AD modified independent assistance without LOB and stable vitals for safe ambulation home/ community distances. 4) Stair training up/down flight 1 step/s with appropriate rail/s and supervision assistance x1 for safe access to previous living environment. 5) Improve balance grades to fair + to reduce risk of falls. 6)Improve LE strength grades by 1 to increase independence w/ transfers and gait.  7) PT for ongoing pt and family education; DME needs and D/C planning to promote highest level of function in least restrictive environment.   Plan   Treatment/Interventions Functional transfer training;LE strengthening/ROM;Therapeutic exercise;Endurance training;Patient/family training;Bed mobility;Equipment eval/education;Gait training;Continued evaluation;Spoke to nursing;OT   PT Frequency 3-5x/wk   Discharge Recommendation   Rehab Resource Intensity Level, PT II (Moderate Resource Intensity)   Equipment Recommended Walker   Change/add to Walker Package? Yes, Change Size   Walker Size Supa (Ht <5'1\")   AM-PAC Basic Mobility Inpatient   Turning in Flat Bed Without Bedrails 3   Lying on Back to Sitting on Edge of Flat Bed Without Bedrails 3   Moving Bed to Chair 3   Standing Up From Chair Using Arms 3   Walk in Room 3   Climb 3-5 Stairs With Railing 2   Basic Mobility Inpatient Raw Score 17   Basic " Mobility Standardized Score 39.67   [facility] Highest Level Of Mobility   -HL Goal 5: Stand one or more mins   -HL Achieved 7: Walk 25 feet or more   Additional Treatment Session   Start Time 1033   End Time 1043   Treatment Assessment Pt treatment time focused on theraputic activity. Pt completed STS transfer from toilet with min A x1 for lower and lifting. Pt requied VC for hand placement. Pt ambulated 10' with RW and min A x1. Pt completed stand to sit in recliner with min A x1. Pt required assistance to boost back in chair. Pt ended session seated in recliner with alarm active and RN at bedside. Pt will benefit from conitnued skilled therapy to progress towards goals.   End of Consult   Patient Position at End of Consult Bedside chair;Bed/Chair alarm activated;All needs within reach       Recommendations                                                                                                              Pt will benefit from continued skilled IP PT to address the above mentioned impairments in order to maximize recovery and increase functional independence when completing mobility and ADLs. See flow sheet for goals and POC.     PT Evaluation Time: 2851-7534  PT Treatment Time: 9891-1695    Bryant Tello, PT, DPT

## 2025-03-03 NOTE — RESPIRATORY THERAPY NOTE
03/02/25 2333   Respiratory Assessment   Assessment Type Assess only   General Appearance Awake   Respiratory Pattern Normal   Chest Assessment Chest expansion symmetrical   Bilateral Breath Sounds Diminished   Resp Comments pt placed on BiPAP for hours of sleep   O2 Device v60   Non-Invasive Information   O2 Interface Device Face mask   Non-Invasive Ventilation Mode BiPAP   $ Intermittent NIV Yes   $ Pulse Oximetry Spot Check Charge Completed   Non-Invasive Settings   IPAP (cm) 16 cm   EPAP (cm) 8 cm   Rate (Set) 8   FiO2 (%) 40   Pressure Support (cm H2O) 8   Rise Time 2   Inspiratory Time (Set) 1   Non-Invasive Readings   Total Rate 22   MV (Mech) 10.8   Peak Pressure (Obs) 17   Spontaneous Vt (mL) 420   Leak (lpm) 50   Skin Intervention Skin intact   Non-Invasive Alarms   Insp Pressure High (cm H20) 25   Insp Pressure Low (cm H20) 5   Low Insp Pressure Time (sec) 20 sec   MV Low (L/min) 2   Vt High (mL) 1200   Vt Low (mL) 200   High Resp Rate (BPM) 50 BPM   Low Resp Rate (BPM) 8 BPM

## 2025-03-03 NOTE — NURSING NOTE
Pt ambulated in hallway without O2, pulse ox 86% and HR 96. Pt complained of SOB during ambulation

## 2025-03-03 NOTE — RESPIRATORY THERAPY NOTE
Home Oxygen Qualifying Test     Patient name: Marisol Otoole        : 1940   Date of Test:  March 3, 2025  Diagnosis:    Home Oxygen Test:    **Medicare Guidelines require item(s) 1-5 on all ambulatory patients or 1 and 2 on non-ambulatory patients.    1. Baseline SPO2 on Room Air at rest 86 %   If <= 88% on Room Air add O2 via NC to obtain SpO2 >=88%. If LPM needed, document LPM  needed to reach =>88%    SPO2 during exertion on Room Air n/a  %  During exertion monitor SPO2. If SPO2 increases >=89%, do not add supplemental oxygen    SPO2 on Oxygen at Rest 95 % at 4 LPM    SPO2 during exertion on Oxygen 91 % at 4 LPM    Test performed during exertion activity.      [x]  Supplemental Home Oxygen is indicated.    []  Client does not qualify for home oxygen.    Respiratory Additional Notes- pt will qualify for home o2 4L.    Maya Porras, RT

## 2025-03-04 ENCOUNTER — TRANSITIONAL CARE MANAGEMENT (OUTPATIENT)
Dept: FAMILY MEDICINE CLINIC | Facility: CLINIC | Age: 85
End: 2025-03-04

## 2025-03-04 ENCOUNTER — TELEPHONE (OUTPATIENT)
Age: 85
End: 2025-03-04

## 2025-03-04 VITALS
TEMPERATURE: 98.3 F | DIASTOLIC BLOOD PRESSURE: 67 MMHG | BODY MASS INDEX: 41.48 KG/M2 | HEART RATE: 60 BPM | OXYGEN SATURATION: 95 % | HEIGHT: 55 IN | RESPIRATION RATE: 18 BRPM | WEIGHT: 179.23 LBS | SYSTOLIC BLOOD PRESSURE: 143 MMHG

## 2025-03-04 LAB
ANION GAP SERPL CALCULATED.3IONS-SCNC: 7 MMOL/L (ref 4–13)
BUN SERPL-MCNC: 32 MG/DL (ref 5–25)
CALCIUM SERPL-MCNC: 9.9 MG/DL (ref 8.4–10.2)
CHLORIDE SERPL-SCNC: 98 MMOL/L (ref 96–108)
CO2 SERPL-SCNC: 35 MMOL/L (ref 21–32)
CREAT SERPL-MCNC: 0.72 MG/DL (ref 0.6–1.3)
ERYTHROCYTE [DISTWIDTH] IN BLOOD BY AUTOMATED COUNT: 20.1 % (ref 11.6–15.1)
GFR SERPL CREATININE-BSD FRML MDRD: 76 ML/MIN/1.73SQ M
GLUCOSE SERPL-MCNC: 94 MG/DL (ref 65–140)
HCT VFR BLD AUTO: 39.9 % (ref 34.8–46.1)
HGB BLD-MCNC: 12.1 G/DL (ref 11.5–15.4)
INR PPP: 2.2 (ref 0.85–1.19)
MCH RBC QN AUTO: 24.9 PG (ref 26.8–34.3)
MCHC RBC AUTO-ENTMCNC: 30.3 G/DL (ref 31.4–37.4)
MCV RBC AUTO: 82 FL (ref 82–98)
PLATELET # BLD AUTO: 373 THOUSANDS/UL (ref 149–390)
PMV BLD AUTO: 9.1 FL (ref 8.9–12.7)
POTASSIUM SERPL-SCNC: 4 MMOL/L (ref 3.5–5.3)
PROTHROMBIN TIME: 25.2 SECONDS (ref 12.3–15)
RBC # BLD AUTO: 4.85 MILLION/UL (ref 3.81–5.12)
SODIUM SERPL-SCNC: 140 MMOL/L (ref 135–147)
WBC # BLD AUTO: 13.49 THOUSAND/UL (ref 4.31–10.16)

## 2025-03-04 PROCEDURE — 99239 HOSP IP/OBS DSCHRG MGMT >30: CPT | Performed by: NURSE PRACTITIONER

## 2025-03-04 PROCEDURE — 85027 COMPLETE CBC AUTOMATED: CPT

## 2025-03-04 PROCEDURE — 94760 N-INVAS EAR/PLS OXIMETRY 1: CPT

## 2025-03-04 PROCEDURE — 80048 BASIC METABOLIC PNL TOTAL CA: CPT

## 2025-03-04 PROCEDURE — 85610 PROTHROMBIN TIME: CPT

## 2025-03-04 RX ORDER — PREDNISONE 20 MG/1
TABLET ORAL
Start: 2025-03-05 | End: 2025-03-09

## 2025-03-04 RX ORDER — CEFDINIR 300 MG/1
300 CAPSULE ORAL EVERY 12 HOURS SCHEDULED
Start: 2025-03-04 | End: 2025-03-07

## 2025-03-04 RX ORDER — BENZONATATE 100 MG/1
100 CAPSULE ORAL 3 TIMES DAILY PRN
Start: 2025-03-04

## 2025-03-04 RX ADMIN — Medication 1 SPRAY: at 09:24

## 2025-03-04 RX ADMIN — PANTOPRAZOLE SODIUM 40 MG: 40 TABLET, DELAYED RELEASE ORAL at 06:05

## 2025-03-04 RX ADMIN — PREDNISONE 40 MG: 20 TABLET ORAL at 09:17

## 2025-03-04 RX ADMIN — GUAIFENESIN 600 MG: 600 TABLET ORAL at 09:18

## 2025-03-04 RX ADMIN — FUROSEMIDE 40 MG: 40 TABLET ORAL at 09:18

## 2025-03-04 RX ADMIN — SERTRALINE HYDROCHLORIDE 100 MG: 50 TABLET ORAL at 09:18

## 2025-03-04 RX ADMIN — PREGABALIN 25 MG: 25 CAPSULE ORAL at 09:18

## 2025-03-04 RX ADMIN — ASPIRIN 81 MG: 81 TABLET, COATED ORAL at 09:17

## 2025-03-04 RX ADMIN — LEVOTHYROXINE SODIUM 50 MCG: 0.05 TABLET ORAL at 06:05

## 2025-03-04 RX ADMIN — CEFTRIAXONE SODIUM 2000 MG: 10 INJECTION, POWDER, FOR SOLUTION INTRAVENOUS at 09:13

## 2025-03-04 NOTE — TELEPHONE ENCOUNTER
Provided rep from [facility] with bipap settings in preparation for upcoming admission.

## 2025-03-04 NOTE — ASSESSMENT & PLAN NOTE
Patient on imaging with concern for a left lower lung opacity  CT chest with evidence of bibasilar pneumonia he will air lymph nodes  Patient given IV cefepime in the ED, and transition to ceftriaxone, will continue (received 6 total days of antibiotics, recommending to continue with antibiotics for 10 total days given slow progression improvement) can transition to oral for discharge  Strep and Legionella are negative  Procalcitonin elevated at 1.42 repeat 7.00>6.68>2.93  MRSA culture negative  Continue ATC nebulizers  Continue Tessalon Perles as needed and Mucinex  Patient overall clinically better now stable for discharge

## 2025-03-04 NOTE — PLAN OF CARE
Problem: PAIN - ADULT  Goal: Verbalizes/displays adequate comfort level or baseline comfort level  Description: Interventions:  - Encourage patient to monitor pain and request assistance  - Assess pain using appropriate pain scale  - Administer analgesics based on type and severity of pain and evaluate response  - Implement non-pharmacological measures as appropriate and evaluate response  - Consider cultural and social influences on pain and pain management  - Notify physician/advanced practitioner if interventions unsuccessful or patient reports new pain  Outcome: Progressing     Problem: SAFETY ADULT  Goal: Patient will remain free of falls  Description: INTERVENTIONS:  - Educate patient/family on patient safety including physical limitations  - Instruct patient to call for assistance with activity   - Consult OT/PT to assist with strengthening/mobility   - Keep Call bell within reach  - Keep bed low and locked with side rails adjusted as appropriate  - Keep care items and personal belongings within reach  - Initiate and maintain comfort rounds  - Make Fall Risk Sign visible to staff  - Offer Toileting every 2 Hours, in advance of need  - Initiate/Maintain bed alarm  - Obtain necessary fall risk management equipment: call bell within reach, bed in lowest position   Problem: SAFETY ADULT  Goal: Maintain or return to baseline ADL function  Description: INTERVENTIONS:  -  Assess patient's ability to carry out ADLs; assess patient's baseline for ADL function and identify physical deficits which impact ability to perform ADLs (bathing, care of mouth/teeth, toileting, grooming, dressing, etc.)  - Assess/evaluate cause of self-care deficits   - Assess range of motion  - Assess patient's mobility; develop plan if impaired  - Assess patient's need for assistive devices and provide as appropriate  - Encourage maximum independence but intervene and supervise when necessary  - Involve family in performance of ADLs  -  Assess for home care needs following discharge   - Consider OT consult to assist with ADL evaluation and planning for discharge  - Provide patient education as appropriate  Outcome: Progressing     - Apply yellow socks and bracelet for high fall risk patients  - Consider moving patient to room near nurses station  Outcome: Progressing

## 2025-03-04 NOTE — ASSESSMENT & PLAN NOTE
Known history of A-fib  Currently not on beta-blocker  Patient on Coumadin for AC  Holding Coumadin for INR 3.61, recheck INR 2.20  Resume Coumadin upon discharge

## 2025-03-04 NOTE — ASSESSMENT & PLAN NOTE
Patient with wheezing likely suspect mild COPD exacerbation  Initially on IV steroids has since been transition to an oral prednisone taper with improvement  Oxygen evaluation obtained patient requires 4 L currently  Recommended for short-term rehab patient is stable for discharge continue to wean oxygen to maintain saturations greater than 90%.

## 2025-03-04 NOTE — ASSESSMENT & PLAN NOTE
Patient meeting sepsis criteria by fever, tachycardia, tachypnea leukocytosis  Continue IV antibiotics  Blood cultures x 2 no growth at 5 days  No evidence of organ dysfunction  Leukocytosis likely in setting of steroid use patient has been afebrile greater than 72 hours

## 2025-03-04 NOTE — CASE MANAGEMENT
Case Management Discharge Planning Note    Patient name Marisol Otoole  Location /-01 MRN [id number]  : 1940 Date 3/4/2025       Current Admission Date: 2025  Current Admission Diagnosis:Pneumonia   Patient Active Problem List    Diagnosis Date Noted Date Diagnosed    Supratherapeutic INR 2025     Sepsis ([facility]) 2025     Influenza A 2025     Epistaxis 2025     SSS (sick sinus syndrome) ([facility]) 2024     Cardiac pacemaker 2024     Non-rheumatic aortic stenosis 2024     Nonrheumatic tricuspid valve regurgitation 2024     Presence of permanent cardiac pacemaker 2024     Paroxysmal atrial fibrillation ([facility]) 2024     Junctional bradycardia 2024     Anticoagulant long-term use 2024     Urinary frequency 2024     At risk for injury related to fall 2024     Walker as ambulation aid 2024     Ambulatory dysfunction 10/24/2023     Fall 2023     Leucocytosis 2023     Orbital mass 2023     Radiculopathy, lumbar region 07/10/2021     Acute and chronic respiratory failure with hypoxia ([facility]) 06/10/2021     Depression, recurrent ([facility]) 2021     Osteopenia 10/22/2020     Insomnia 2020     History of transcatheter aortic valve replacement (TAVR) 10/09/2018     Primary osteoarthritis of left shoulder      AVB (atrioventricular block)      Chronic heart failure with preserved ejection fraction (HFpEF) ([facility])      COPD, mild ([facility])      Coronary artery disease involving native coronary artery of native heart without angina pectoris      JANICE on CPAP      Gastroesophageal reflux disease without esophagitis 2018     Iron deficiency anemia secondary to inadequate dietary iron intake 2018     Morbid obesity due to excess calories ([facility]) 2018     JANICE (obstructive sleep apnea) 2018     Hyperlipidemia 2017     Primary hypertension 2017     Pneumonia 2017     GERD  (gastroesophageal reflux disease) 10/29/2017     Acquired hypothyroidism 10/29/2017     LVH (left ventricular hypertrophy) 09/22/2016     Mitral annular calcification 09/22/2016     Mitral valve stenosis 09/22/2016     Pulmonary hypertension (HCC) 09/22/2016       LOS (days): 7  Geometric Mean LOS (GMLOS) (days): 4.9  Days to GMLOS:-2.1     OBJECTIVE:  Risk of Unplanned Readmission Score: 25.85         Current admission status: Inpatient   Preferred Pharmacy:   [id number]  [place] - [address]  [place] [address]  Phone: [phone] Fax: [phone]    Primary Care Provider: MABEL Hallman    Primary Insurance: MEDICARE  Secondary Insurance:     DISCHARGE DETAILS:    Treatment Team Recommendation: Short Term Rehab  Discharge Destination Plan:: Short Term Rehab        Number/Name of Dispatcher: Roundtrip  Transported by (Company and Unit #): [employer]  ETA of Transport (Date): 03/04/25  ETA of Transport (Time): 1230       Family notified:: JOS spoke with [facility] via phone call and informed of 12:30pm transport. [facility] plans to stop by [facility] around 1pm to drop of pt's home CPAP machine and clothes.  Additional Comments: JOS spoke with Sabrina at [facility] and confirmed they can accept pt today. Sabrina aware of 12:30pm transport time. Provider and nursing also aware of 12:30pm transport.

## 2025-03-04 NOTE — RESPIRATORY THERAPY NOTE
03/03/25 2231   Respiratory Assessment   Assessment Type Assess only   General Appearance Awake   Respiratory Pattern Normal   Chest Assessment Chest expansion symmetrical   Bilateral Breath Sounds Diminished;Clear   Resp Comments pt placed on BiPAP for hours of sleep   O2 Device v60   Non-Invasive Information   O2 Interface Device Face mask   Non-Invasive Ventilation Mode BiPAP   $ Intermittent NIV Yes   $ Pulse Oximetry Spot Check Charge Completed   Non-Invasive Settings   IPAP (cm) 16 cm   EPAP (cm) 8 cm   Rate (Set) 8   FiO2 (%) 40   Pressure Support (cm H2O) 8   Rise Time 2   Inspiratory Time (Set) 1   Non-Invasive Readings   Total Rate 17   MV (Mech) 8.1   Peak Pressure (Obs) 16   Spontaneous Vt (mL) 463   Leak (lpm) 0   Skin Intervention Skin intact   Non-Invasive Alarms   Insp Pressure High (cm H20) 25   Insp Pressure Low (cm H20) 5   Low Insp Pressure Time (sec) 20 sec   MV Low (L/min) 2   Vt High (mL) 1200   Vt Low (mL) 200   High Resp Rate (BPM) 50 BPM   Low Resp Rate (BPM) 8 BPM

## 2025-03-04 NOTE — ASSESSMENT & PLAN NOTE
Patient is CPAP and O2 dependent at night only  Patient upon admission to be desatting in ED and 70s requiring BiPAP she was weaned to 4 L nasal cannula patient's baseline is 2 L (although patient reports she wears oxygen intermittently, however she also reports her baseline oxygen saturation is high 80s at baseline)   Continue to wean oxygen maintaining sats greater than 90%  Home O2 eval recommending 4 L home O2  Continue prednisone taper  Respiratory protocol

## 2025-03-04 NOTE — DISCHARGE SUMMARY
[facility]  Discharge Summary  Name: Marisol Otoole I MRN: [id number]  Unit/Bed#: -01I Date of Admission: 2/25/2025   Date of Service: 2/25/2025  I Hospital Day: 7    * Pneumonia  Assessment & Plan  Patient on imaging with concern for a left lower lung opacity  CT chest with evidence of bibasilar pneumonia he will air lymph nodes  Patient given IV cefepime in the ED, and transition to ceftriaxone, will continue (received 6 total days of antibiotics, recommending to continue with antibiotics for 10 total days given slow progression improvement) can transition to oral for discharge  Strep and Legionella are negative  Procalcitonin elevated at 1.42 repeat 7.00>6.68>2.93  MRSA culture negative  Continue ATC nebulizers  Continue Tessalon Perles as needed and Mucinex  Patient overall clinically better now stable for discharge    Sepsis (HCC)  Assessment & Plan  Patient meeting sepsis criteria by fever, tachycardia, tachypnea leukocytosis  Continue IV antibiotics  Blood cultures x 2 no growth at 5 days  No evidence of organ dysfunction  Leukocytosis likely in setting of steroid use patient has been afebrile greater than 72 hours    Acute and chronic respiratory failure with hypoxia (HCC)  Assessment & Plan  Patient is CPAP and O2 dependent at night only  Patient upon admission to be desatting in ED and 70s requiring BiPAP she was weaned to 4 L nasal cannula patient's baseline is 2 L (although patient reports she wears oxygen intermittently, however she also reports her baseline oxygen saturation is high 80s at baseline)   Continue to wean oxygen maintaining sats greater than 90%  Home O2 eval recommending 4 L home O2  Continue prednisone taper  Respiratory protocol    Influenza A  Assessment & Plan  4-day history of increased shortness of breath not feeling well  Patient out of the window for Tamiflu  Supportive measures    Paroxysmal atrial fibrillation (HCC)  Assessment & Plan  Known history  of A-fib  Currently not on beta-blocker  Patient on Coumadin for AC  Holding Coumadin for INR 3.61, recheck INR 2.20  Resume Coumadin upon discharge    COPD, mild (HCC) with exacerbation   Assessment & Plan  Patient with wheezing likely suspect mild COPD exacerbation  Initially on IV steroids has since been transition to an oral prednisone taper with improvement  Oxygen evaluation obtained patient requires 4 L currently  Recommended for short-term rehab patient is stable for discharge continue to wean oxygen to maintain saturations greater than 90%.    Chronic heart failure with preserved ejection fraction (HFpEF) (HCC)  Assessment & Plan  Wt Readings from Last 3 Encounters:   03/03/25 81.3 kg (179 lb 3.7 oz)   01/31/25 79.8 kg (175 lb 14.8 oz)   01/29/25 79.8 kg (175 lb 14.8 oz)     Last echo was from 2022 LVEF 60%, grade 1 diastolic dysfunction status post TAVR no evidence of volume overload  Weight appears stable at baseline  I/O, daily weight  Resume diuretic currently resume daily patient takes every other day  Can resume home dose upon discharge          Morbid obesity due to excess calories (HCC)  Assessment & Plan  Body mass index is 43.22 kg/m².  Recommend incorporating a more whole foods plant-predominant diet along with decreasing consumption of red meats and processed foods  Per AHA guidelines, recommend moderate-vigorous intensity exercise for 30 minutes a day for 5 days a week or a total of 150 min/week      JANICE (obstructive sleep apnea)  Assessment & Plan  Continue BiPAP nightly    Primary hypertension  Assessment & Plan  Blood pressure soft on admission improved with IV fluids  Continue to monitor routine vitals per unit    Acquired hypothyroidism  Assessment & Plan  Continue levothyroxine    GERD (gastroesophageal reflux disease)  Assessment & Plan  Continue PPI        Medical Problems       Resolved Problems  Date Reviewed: 3/1/2025   None       Discharging Physician / Practitioner: Marisol Guidry,  MABEL  PCP: MABEL Hallman  Admission Date:   Admission Orders (From admission, onward)       Ordered        02/25/25 1100  INPATIENT ADMISSION  Once                          Discharge Date: 03/04/25    Consultations During Hospital Stay:  None    Procedures Performed:   none    Significant Findings / Test Results:   CT chest wo contrast  Result Date: 2/25/2025  Bibasilar consolidation left greater than right consistent with multilobar pneumonia. Reactive mediastinal nodes. Small left and trace right pleural effusion. Stable cardiomegaly. Stable hepatomegaly. Workstation performed: [id number]         Incidental Findings:   none       Test Results Pending at Discharge (will require follow up):   none     Outpatient Tests Requested:  none    Complications: Multiple conditions COPD pneumonia flu respiratory failure    Reason for Admission: Pneumonia influenza A    Hospital Course:   Marisol Otoole is a 84 y.o. female patient with  has a past medical history of Anemia (08/22/2018), Anxiety, Arthritis, AVB (atrioventricular block), Cataract, CHF (congestive heart failure) (HCC), COPD, mild (HCC), Coronary artery disease, Dislocation of right shoulder joint, Frequent UTI, GERD (gastroesophageal reflux disease), H/O: pneumonia, Heme positive stool, Hyperlipidemia, Hypertension, Hypothyroidism, Morbid obesity with BMI of 50.0-59.9, adult (HCC), Obesity, morbid (HCC) (08/22/2018), JANICE on CPAP, Pulmonary hypertension (HCC) (08/22/2018), Severe aortic stenosis, Simple goiter, Skin cyst, and Wears glasses. who originally presented to the hospital on 2/25/2025 due to Shortness of Breath (Pt arrives via EMS from home with increased SOB over the last 2 days, hx of CHF. Sat sin 70's for EMS crew. Fever of 104.5. Pt arrives on CPAP in place. ).  Several day history of shortness of breath generally not feeling well brought to the ED due to increasing shortness of breath requiring BiPAP upon admission to the ED.  Patient found to be  "influenza A positive pneumonia on CT imaging given clinically ill appearance.  Was treated with antibiotics for 6 days on discharge continued for another 4 for 10 days of treatment given how clinically ill the patient wants.  She progressed very slowly was having active wheezing give her steroids in setting of likely COPD exacerbation.      Please see above list of diagnoses and related plan for additional information.     Condition at Discharge: good    Discharge Day Visit / Exam:   Subjective:  Patient reports feeling better than when she comes and really wanted to go home but understands the need to go to rehab.  Vitals: Blood Pressure: 143/67 (03/04/25 0744)  Pulse: 60 (03/04/25 0744)  Temperature: 98.3 °F (36.8 °C) (03/04/25 0744)  Temp Source: Oral (03/04/25 0744)  Respirations: 18 (03/04/25 0744)  Height: 4' 6\" (137.2 cm) (02/25/25 1530)  Weight - Scale: 81.3 kg (179 lb 3.7 oz) (03/03/25 0444)  SpO2: 95 % (03/04/25 0744)  Exam:   Physical Exam  Vitals and nursing note reviewed.   Constitutional:       General: She is not in acute distress.     Appearance: She is well-developed. She is obese.   HENT:      Head: Normocephalic and atraumatic.   Eyes:      Conjunctiva/sclera: Conjunctivae normal.   Cardiovascular:      Rate and Rhythm: Normal rate and regular rhythm.      Heart sounds: No murmur heard.  Pulmonary:      Effort: Pulmonary effort is normal. No respiratory distress.      Breath sounds: Normal breath sounds.   Abdominal:      Palpations: Abdomen is soft.      Tenderness: There is no abdominal tenderness.   Musculoskeletal:         General: No swelling.      Cervical back: Neck supple.   Skin:     General: Skin is warm and dry.      Capillary Refill: Capillary refill takes less than 2 seconds.   Neurological:      Mental Status: She is alert and oriented to person, place, and time.   Psychiatric:         Mood and Affect: Mood normal.           Discussion with Family: Updated  (friend) via " phone. Yuridia     Discharge instructions/Information to patient and family:   See after visit summary for information provided to patient and family.      Provisions for Follow-Up Care:  See after visit summary for information related to follow-up care and any pertinent home health orders.      Mobility at time of Discharge:   Basic Mobility Inpatient Raw Score: 17  JH-HLM Goal: 5: Stand one or more mins  JH-HLM Achieved: 5: Stand (1 or more minutes)  HLM Goal achieved. Continue to encourage appropriate mobility.     Disposition:   Other Skilled Nursing Facility at [facility]    Planned Readmission: none     Discharge Statement:  I spent 45 minutes discharging the patient. This time was spent on the day of discharge. I had direct contact with the patient on the day of discharge. Greater than 50% of the total time was spent examining patient, answering all patient questions, arranging and discussing plan of care with patient as well as directly providing post-discharge instructions.  Additional time then spent on discharge activities.    Discharge Medications:  See after visit summary for reconciled discharge medications provided to patient and/or family.      **Please Note: This note may have been constructed using a voice recognition system**

## 2025-03-04 NOTE — PLAN OF CARE
Problem: PAIN - ADULT  Goal: Verbalizes/displays adequate comfort level or baseline comfort level  Description: Interventions:  - Encourage patient to monitor pain and request assistance  - Assess pain using appropriate pain scale  - Administer analgesics based on type and severity of pain and evaluate response  - Implement non-pharmacological measures as appropriate and evaluate response  - Consider cultural and social influences on pain and pain management  - Notify physician/advanced practitioner if interventions unsuccessful or patient reports new pain  Outcome: Progressing     Problem: INFECTION - ADULT  Goal: Absence or prevention of progression during hospitalization  Description: INTERVENTIONS:  - Assess and monitor for signs and symptoms of infection  - Monitor lab/diagnostic results  - Monitor all insertion sites, i.e. indwelling lines, tubes, and drains  - Monitor endotracheal if appropriate and nasal secretions for changes in amount and color  - [place] appropriate cooling/warming therapies per order  - Administer medications as ordered  - Instruct and encourage patient and family to use good hand hygiene technique  - Identify and instruct in appropriate isolation precautions for identified infection/condition  Outcome: Progressing     Problem: SAFETY ADULT  Goal: Patient will remain free of falls  Description: INTERVENTIONS:  - Educate patient/family on patient safety including physical limitations  - Instruct patient to call for assistance with activity   - Consult OT/PT to assist with strengthening/mobility   - Keep Call bell within reach  - Keep bed low and locked with side rails adjusted as appropriate  - Keep care items and personal belongings within reach  - Initiate and maintain comfort rounds  - Make Fall Risk Sign visible to staff  - Offer Toileting every 2 Hours, in advance of need  - Initiate/Maintain bed alarm  - Obtain necessary fall risk management equipment  - Apply yellow socks and  bracelet for high fall risk patients  - Consider moving patient to room near nurses station  Outcome: Progressing  Goal: Maintain or return to baseline ADL function  Description: INTERVENTIONS:  -  Assess patient's ability to carry out ADLs; assess patient's baseline for ADL function and identify physical deficits which impact ability to perform ADLs (bathing, care of mouth/teeth, toileting, grooming, dressing, etc.)  - Assess/evaluate cause of self-care deficits   - Assess range of motion  - Assess patient's mobility; develop plan if impaired  - Assess patient's need for assistive devices and provide as appropriate  - Encourage maximum independence but intervene and supervise when necessary  - Involve family in performance of ADLs  - Assess for home care needs following discharge   - Consider OT consult to assist with ADL evaluation and planning for discharge  - Provide patient education as appropriate  Outcome: Progressing  Goal: Maintains/Returns to pre admission functional level  Description: INTERVENTIONS:  - Perform AM-PAC 6 Click Basic Mobility/ Daily Activity assessment daily.  - Set and communicate daily mobility goal to care team and patient/family/caregiver.   - Collaborate with rehabilitation services on mobility goals if consulted  - Perform Range of Motion 3 times a day.  - Reposition patient every 2 hours.  - Dangle patient 3 times a day  - Stand patient 3 times a day  - Ambulate patient 3 times a day  - Out of bed to chair 3 times a day   - Out of bed for meals 3 times a day  - Out of bed for toileting  - Record patient progress and toleration of activity level   Outcome: Progressing     Problem: DISCHARGE PLANNING  Goal: Discharge to home or other facility with appropriate resources  Description: INTERVENTIONS:  - Identify barriers to discharge w/patient and caregiver  - Arrange for needed discharge resources and transportation as appropriate  - Identify discharge learning needs (meds, wound care,  etc.)  - Arrange for interpretive services to assist at discharge as needed  - Refer to Case Management Department for coordinating discharge planning if the patient needs post-hospital services based on physician/advanced practitioner order or complex needs related to functional status, cognitive ability, or social support system  Outcome: Progressing     Problem: Knowledge Deficit  Goal: Patient/family/caregiver demonstrates understanding of disease process, treatment plan, medications, and discharge instructions  Description: Complete learning assessment and assess knowledge base.  Interventions:  - Provide teaching at level of understanding  - Provide teaching via preferred learning methods  Outcome: Progressing     Problem: Prexisting or High Potential for Compromised Skin Integrity  Goal: Skin integrity is maintained or improved  Description: INTERVENTIONS:  - Identify patients at risk for skin breakdown  - Assess and monitor skin integrity  - Assess and monitor nutrition and hydration status  - Monitor labs   - Assess for incontinence   - Turn and reposition patient  - Assist with mobility/ambulation  - Relieve pressure over bony prominences  - Avoid friction and shearing  - Provide appropriate hygiene as needed including keeping skin clean and dry  - Evaluate need for skin moisturizer/barrier cream  - Collaborate with interdisciplinary team   - Patient/family teaching  - Consider wound care consult   Outcome: Progressing

## 2025-03-04 NOTE — NURSING NOTE
Attempted 3 times to call [place] to give report, however, I was able to leave message on nursing supervisor's phone that  [facility] nurse attempted to give report.

## 2025-03-05 ENCOUNTER — TELEPHONE (OUTPATIENT)
Age: 85
End: 2025-03-05

## 2025-03-05 ENCOUNTER — PATIENT OUTREACH (OUTPATIENT)
Dept: CASE MANAGEMENT | Facility: OTHER | Age: 85
End: 2025-03-05

## 2025-03-05 NOTE — TELEPHONE ENCOUNTER
Phone call from patient stating she has an upcoming appointment for her infusion and she is currently in rehab and will not be able to come in. I did warm transfer to infusion center and they were closed. I did leave a detailed message with patient info stating patient unable to keep appointment for 3/1/2025 and she will call once discharged from rehab to reschedule.

## 2025-03-05 NOTE — PROGRESS NOTES
Outpatient Care Management ERIN/SNF Pathway. Discharged 3/4/25 to [facility]. Email sent to facility to inform them the patient is on the ERIN Pathway and I will be following them during their skilled stay.  This Admin Coordinator will continue to monitor via chart review.

## 2025-03-12 ENCOUNTER — PATIENT OUTREACH (OUTPATIENT)
Dept: CASE MANAGEMENT | Facility: OTHER | Age: 85
End: 2025-03-12

## 2025-03-14 ENCOUNTER — TELEPHONE (OUTPATIENT)
Age: 85
End: 2025-03-14

## 2025-03-14 NOTE — TELEPHONE ENCOUNTER
Caller: Oly at [facility]    Doctor: Dr. Aranda    Reason for call: Calling to confirm if pts appt was cancelled.     Call back#: [phone]

## 2025-03-19 ENCOUNTER — PATIENT OUTREACH (OUTPATIENT)
Dept: CASE MANAGEMENT | Facility: OTHER | Age: 85
End: 2025-03-19

## 2025-03-19 NOTE — PROGRESS NOTES
Update obtained from [facility] the patient discharged 3/17/25 to Home. I updated the care coordination note, removed myself as the responsible staff, added the appropriate responsible staff.

## 2025-03-20 ENCOUNTER — TELEPHONE (OUTPATIENT)
Dept: LAB | Facility: HOSPITAL | Age: 85
End: 2025-03-20

## 2025-03-20 ENCOUNTER — RESULTS FOLLOW-UP (OUTPATIENT)
Dept: CARDIOLOGY CLINIC | Facility: CLINIC | Age: 85
End: 2025-03-20

## 2025-03-20 ENCOUNTER — PATIENT OUTREACH (OUTPATIENT)
Dept: CASE MANAGEMENT | Facility: OTHER | Age: 85
End: 2025-03-20

## 2025-03-20 ENCOUNTER — ANTICOAG VISIT (OUTPATIENT)
Dept: CARDIOLOGY CLINIC | Facility: CLINIC | Age: 85
End: 2025-03-20

## 2025-03-20 ENCOUNTER — TELEPHONE (OUTPATIENT)
Dept: CARDIOLOGY CLINIC | Facility: CLINIC | Age: 85
End: 2025-03-20

## 2025-03-20 ENCOUNTER — APPOINTMENT (OUTPATIENT)
Dept: LAB | Facility: HOSPITAL | Age: 85
End: 2025-03-20
Payer: MEDICARE

## 2025-03-20 ENCOUNTER — TELEPHONE (OUTPATIENT)
Age: 85
End: 2025-03-20

## 2025-03-20 DIAGNOSIS — E67.3 HYPERVITAMINOSIS D: ICD-10-CM

## 2025-03-20 DIAGNOSIS — D50.8 IRON DEFICIENCY ANEMIA SECONDARY TO INADEQUATE DIETARY IRON INTAKE: Primary | ICD-10-CM

## 2025-03-20 DIAGNOSIS — R53.83 FATIGUE, UNSPECIFIED TYPE: Primary | ICD-10-CM

## 2025-03-20 DIAGNOSIS — D53.9 NUTRITIONAL ANEMIA, UNSPECIFIED: ICD-10-CM

## 2025-03-20 DIAGNOSIS — I48.0 PAROXYSMAL ATRIAL FIBRILLATION (HCC): ICD-10-CM

## 2025-03-20 LAB
INR PPP: 4.87 (ref 0.85–1.19)
PROTHROMBIN TIME: 45.7 SECONDS (ref 12.3–15)

## 2025-03-20 PROCEDURE — 36415 COLL VENOUS BLD VENIPUNCTURE: CPT

## 2025-03-20 PROCEDURE — 85610 PROTHROMBIN TIME: CPT

## 2025-03-20 NOTE — PROGRESS NOTES
S/w pt. She was in rehab and recently dc'd. She thinks she was taking it differently then her home dose but she is not sure.. Advised to hold x 2 days then go back to 2.5mg daily . Sent request to mobile lab to have it drawn next Thursday.   
No

## 2025-03-20 NOTE — TELEPHONE ENCOUNTER
Called Adrienne and she is aware and she would like mobile lab set up for patient to come on Tuesday, called mobile lab and they will call her tomorrow, tried to call Marisol, unable to leave message, mailbox full

## 2025-03-20 NOTE — RESULT ENCOUNTER NOTE
S/w pt. She was in rehab and recently dc'd. She thinks she was taking it differently then her home dose but she is not sure.. Advised to hold x 2 days then go back to 2.5mg daily . Sent request to mobile lab to have it drawn next Thursday.

## 2025-03-20 NOTE — PROGRESS NOTES
Chart reviewed after receiving pt as care transitions for ERIN pathway. Pt had been admitted 2/25-3/4 to [facility] for shortness of breath r/t pneumonia, positive Influenza A. D/C'd to [facility] and then to home on 3/17. Chart reviewed.     Call placed to Marisol. She states that she feels better except for being very tired. She walks from one room to another with walker and finds herself falling asleep in a chair. She would like to see herself have more energy. Marisol stated that she was receiving iron infusions but has not since hospitalization recently and thinks that some of her energy will return once she restarts these.  Pt reports being independent usually. Her friend Angeles lives with her at home,drives her to [employer], but does not cook meals. Friend will also do grocery shopping. There is also her 's wife who looks in on her.   Pt states that she has only eaten mainly dry cereal since she is home from rehab because she is so tired. Discussed proper nutrition and ideas for healthier, more readily available things to eat that have protein until she gets some strength back.Marisol uses oxygen at 2 L. Recommended using incentive spirometer, which she has at home. She is coughing, denies fever or discolored sputum.   Marisol states that nursing was out for in home visits from [facility] . She will also have PT visit today and will have OT coming.   Will perform chart review weekly until ERIN episode ends on 4/1. Pt has PCP appt on 3/25.

## 2025-03-20 NOTE — TELEPHONE ENCOUNTER
Lazaro-Physical therapist reports she did the initial evaluation for the pt. Lazaro reports pt is very fatigue, lack of appetite and she has 2-wounds healing. Physical therapist needs approval for Physical therapy and also just a FYI if PCP thinks its a good idea to reorder labs for the patient. Pt is getting iron infusions in April, but Lazaro is concerned it may be pts Hemoglobin.  Any questions or concerns, Please call lazaro @[phone].

## 2025-03-21 NOTE — TELEPHONE ENCOUNTER
Patient called stating she was returning a missed call.    Reviewed chart, but was unable to find unacknowledged phone encounters.    Reviewed with patient upcoming appointments which she confirmed for both office visit (3/25) and mobile lab (3/27).

## 2025-03-24 DIAGNOSIS — Z79.01 LONG TERM (CURRENT) USE OF ANTICOAGULANTS: ICD-10-CM

## 2025-03-25 ENCOUNTER — TELEPHONE (OUTPATIENT)
Dept: LAB | Facility: HOSPITAL | Age: 85
End: 2025-03-25

## 2025-03-25 ENCOUNTER — OFFICE VISIT (OUTPATIENT)
Dept: FAMILY MEDICINE CLINIC | Facility: CLINIC | Age: 85
End: 2025-03-25
Payer: MEDICARE

## 2025-03-25 ENCOUNTER — HOSPITAL ENCOUNTER (OUTPATIENT)
Dept: RADIOLOGY | Facility: HOSPITAL | Age: 85
Discharge: HOME | End: 2025-03-25
Payer: MEDICARE

## 2025-03-25 VITALS
HEART RATE: 77 BPM | DIASTOLIC BLOOD PRESSURE: 70 MMHG | HEIGHT: 55 IN | OXYGEN SATURATION: 98 % | BODY MASS INDEX: 43.22 KG/M2 | RESPIRATION RATE: 18 BRPM | SYSTOLIC BLOOD PRESSURE: 134 MMHG

## 2025-03-25 DIAGNOSIS — I50.33 ACUTE ON CHRONIC DIASTOLIC (CONGESTIVE) HEART FAILURE (HCC): ICD-10-CM

## 2025-03-25 DIAGNOSIS — Z99.89 WALKER AS AMBULATION AID: ICD-10-CM

## 2025-03-25 DIAGNOSIS — J18.9 PNEUMONIA DUE TO INFECTIOUS ORGANISM, UNSPECIFIED LATERALITY, UNSPECIFIED PART OF LUNG: ICD-10-CM

## 2025-03-25 DIAGNOSIS — R53.83 FATIGUE, UNSPECIFIED TYPE: ICD-10-CM

## 2025-03-25 DIAGNOSIS — F33.9 DEPRESSION, RECURRENT (HCC): ICD-10-CM

## 2025-03-25 DIAGNOSIS — Z09 HOSPITAL DISCHARGE FOLLOW-UP: Primary | ICD-10-CM

## 2025-03-25 DIAGNOSIS — Z95.2 HISTORY OF TRANSCATHETER AORTIC VALVE REPLACEMENT (TAVR): ICD-10-CM

## 2025-03-25 DIAGNOSIS — D50.8 IRON DEFICIENCY ANEMIA SECONDARY TO INADEQUATE DIETARY IRON INTAKE: ICD-10-CM

## 2025-03-25 DIAGNOSIS — J96.21 ACUTE AND CHRONIC RESPIRATORY FAILURE WITH HYPOXIA (HCC): ICD-10-CM

## 2025-03-25 PROBLEM — Z91.81 AT RISK FOR INJURY RELATED TO FALL: Status: RESOLVED | Noted: 2024-01-16 | Resolved: 2025-03-25

## 2025-03-25 PROBLEM — R35.0 URINARY FREQUENCY: Status: RESOLVED | Noted: 2024-06-27 | Resolved: 2025-03-25
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
## 2025-03-25 PROBLEM — R04.0 EPISTAXIS: Status: RESOLVED | Noted: 2025-01-29 | Resolved: 2025-03-25

## 2025-03-25 PROBLEM — D72.829 LEUCOCYTOSIS: Status: RESOLVED | Noted: 2023-08-14 | Resolved: 2025-03-25

## 2025-03-25 PROBLEM — R79.1 SUPRATHERAPEUTIC INR: Status: RESOLVED | Noted: 2025-03-01 | Resolved: 2025-03-25

## 2025-03-25 PROBLEM — G47.33 OSA (OBSTRUCTIVE SLEEP APNEA): Status: RESOLVED | Noted: 2018-01-31 | Resolved: 2025-03-25

## 2025-03-25 PROBLEM — A41.9 SEPSIS (HCC): Status: RESOLVED | Noted: 2025-02-25 | Resolved: 2025-03-25

## 2025-03-25 PROBLEM — K21.9 GASTROESOPHAGEAL REFLUX DISEASE WITHOUT ESOPHAGITIS: Status: RESOLVED | Noted: 2018-08-28 | Resolved: 2025-03-25

## 2025-03-25 PROBLEM — J10.1 INFLUENZA A: Status: RESOLVED | Noted: 2025-02-25 | Resolved: 2025-03-25

## 2025-03-25 PROCEDURE — 71046 X-RAY EXAM CHEST 2 VIEWS: CPT

## 2025-03-25 PROCEDURE — 99495 TRANSJ CARE MGMT MOD F2F 14D: CPT | Performed by: NURSE PRACTITIONER

## 2025-03-25 RX ORDER — WARFARIN SODIUM 2.5 MG/1
TABLET ORAL
Qty: 102 TABLET | Refills: 3 | Status: SHIPPED | OUTPATIENT
Start: 2025-03-25

## 2025-03-25 NOTE — PROGRESS NOTES
Transition of Care Visit  Name: Marisol Otoole      : 1940      MRN: [id number]  Encounter Provider: MABEL Hallman  Encounter Date: 3/25/2025   Encounter department: [facility] [address]    Assessment & Plan  Fatigue, unspecified type  Discussed with patient that this is likely multifactorial as patient was recently admitted with pneumonia, iron deficiency, age, cardiac condition and depression.  Will send mobile labs for blood work that was previously ordered.  Orders:    Iron Panel (Includes Ferritin, Iron Sat%, Iron, and TIBC); Future    Acute on chronic diastolic (congestive) heart failure (HCC)  Wt Readings from Last 3 Encounters:   25 81.3 kg (179 lb 3.7 oz)   25 79.8 kg (175 lb 14.8 oz)   25 79.8 kg (175 lb 14.8 oz)   Continue care with cardiology.               Iron deficiency anemia secondary to inadequate dietary iron intake  Continue care with hematology.  Patient does have iron infusion scheduled for next week.  Will obtain current iron panel.  Orders:    Iron Panel (Includes Ferritin, Iron Sat%, Iron, and TIBC); Future    Pneumonia due to infectious organism, unspecified laterality, unspecified part of lung  Will obtain chest x-ray to assess.  Orders:    XR chest pa and lateral; Future    Acute and chronic respiratory failure with hypoxia (HCC)  Continues on 2 L home O2.       Depression, recurrent (HCC)    Doing okay with Zoloft.       History of transcatheter aortic valve replacement (TAVR)  Continue care with cardiology.       Walker as ambulation aid         Hospital discharge follow-up              History of Present Illness     Transitional Care Management Review:   Marisol Otoloe is a 84 y.o. female here for TCM follow up.     During the TCM phone call patient stated:  TCM Call (since 3/11/2025)       None          TCM Call (since 3/11/2025)       None          Presents for TCM visit.  Patient was admitted to [facility] from  "February 25 to March 4 and subsequently transferred to [facility] for rehab.  She is home with home health care with residential.  She has physical therapy and nursing.  She is concerned with fatigue.      Review of Systems   Constitutional:  Positive for fatigue. Negative for chills and fever.   HENT:  Negative for ear pain and sore throat.    Eyes:  Negative for pain and visual disturbance.   Respiratory:  Negative for cough, chest tightness, shortness of breath and wheezing.    Cardiovascular:  Negative for chest pain and palpitations.   Gastrointestinal:  Negative for abdominal pain and vomiting.   Genitourinary:  Negative for dysuria and hematuria.   Musculoskeletal:  Positive for arthralgias. Negative for back pain.   Skin:  Negative for color change and rash.   Neurological:  Negative for dizziness, seizures, syncope, light-headedness and headaches.   All other systems reviewed and are negative.    Objective   /70 (BP Location: Left arm, Patient Position: Sitting)   Pulse 77   Resp 18   Ht 4' 6\" (1.372 m)   SpO2 98%   BMI 43.22 kg/m²     Physical Exam  Vitals and nursing note reviewed.   Constitutional:       General: She is not in acute distress.     Appearance: She is well-developed.   HENT:      Head: Normocephalic and atraumatic.   Cardiovascular:      Rate and Rhythm: Normal rate and regular rhythm.      Heart sounds: Murmur heard.   Pulmonary:      Effort: Pulmonary effort is normal. No respiratory distress.      Breath sounds: Normal breath sounds.   Abdominal:      Palpations: Abdomen is soft.      Tenderness: There is no abdominal tenderness.   Musculoskeletal:         General: No swelling.      Cervical back: Neck supple.   Skin:     General: Skin is warm and dry.      Capillary Refill: Capillary refill takes less than 2 seconds.   Neurological:      Mental Status: She is alert and oriented to person, place, and time.   Psychiatric:         Mood and Affect: Mood normal. "       Medications have been reviewed by provider in current encounter

## 2025-03-25 NOTE — ASSESSMENT & PLAN NOTE
Continue care with hematology.  Patient does have iron infusion scheduled for next week.  Will obtain current iron panel.  Orders:    Iron Panel (Includes Ferritin, Iron Sat%, Iron, and TIBC); Future

## 2025-03-25 NOTE — ASSESSMENT & PLAN NOTE
Wt Readings from Last 3 Encounters:   03/03/25 81.3 kg (179 lb 3.7 oz)   01/31/25 79.8 kg (175 lb 14.8 oz)   01/29/25 79.8 kg (175 lb 14.8 oz)   Continue care with cardiology.

## 2025-03-27 ENCOUNTER — RESULTS FOLLOW-UP (OUTPATIENT)
Dept: FAMILY MEDICINE CLINIC | Facility: CLINIC | Age: 85
End: 2025-03-27

## 2025-03-27 ENCOUNTER — APPOINTMENT (OUTPATIENT)
Dept: LAB | Facility: HOSPITAL | Age: 85
End: 2025-03-27
Payer: MEDICARE

## 2025-03-27 ENCOUNTER — ANTICOAG VISIT (OUTPATIENT)
Dept: CARDIOLOGY CLINIC | Facility: CLINIC | Age: 85
End: 2025-03-27

## 2025-03-27 ENCOUNTER — PATIENT OUTREACH (OUTPATIENT)
Dept: CASE MANAGEMENT | Facility: OTHER | Age: 85
End: 2025-03-27

## 2025-03-27 DIAGNOSIS — R53.83 FATIGUE, UNSPECIFIED TYPE: ICD-10-CM

## 2025-03-27 DIAGNOSIS — D53.9 NUTRITIONAL ANEMIA, UNSPECIFIED: ICD-10-CM

## 2025-03-27 DIAGNOSIS — D50.8 IRON DEFICIENCY ANEMIA SECONDARY TO INADEQUATE DIETARY IRON INTAKE: ICD-10-CM

## 2025-03-27 DIAGNOSIS — D64.9 NORMOCYTIC ANEMIA: ICD-10-CM

## 2025-03-27 DIAGNOSIS — E67.3 HYPERVITAMINOSIS D: ICD-10-CM

## 2025-03-27 DIAGNOSIS — I48.0 PAROXYSMAL ATRIAL FIBRILLATION (HCC): ICD-10-CM

## 2025-03-27 PROBLEM — J18.9 PNEUMONIA: Status: RESOLVED | Noted: 2017-12-03 | Resolved: 2025-03-27

## 2025-03-27 LAB
25(OH)D3 SERPL-MCNC: 47.5 NG/ML (ref 30–100)
ALBUMIN SERPL BCG-MCNC: 3.6 G/DL (ref 3.5–5)
ALP SERPL-CCNC: 121 U/L (ref 34–104)
ALT SERPL W P-5'-P-CCNC: 18 U/L (ref 7–52)
ANION GAP SERPL CALCULATED.3IONS-SCNC: 12 MMOL/L (ref 4–13)
AST SERPL W P-5'-P-CCNC: 26 U/L (ref 13–39)
BASOPHILS # BLD AUTO: 0.06 THOUSANDS/ÂΜL (ref 0–0.1)
BASOPHILS NFR BLD AUTO: 1 % (ref 0–1)
BILIRUB SERPL-MCNC: 0.36 MG/DL (ref 0.2–1)
BUN SERPL-MCNC: 17 MG/DL (ref 5–25)
CALCIUM SERPL-MCNC: 10 MG/DL (ref 8.4–10.2)
CHLORIDE SERPL-SCNC: 100 MMOL/L (ref 96–108)
CO2 SERPL-SCNC: 29 MMOL/L (ref 21–32)
CREAT SERPL-MCNC: 0.82 MG/DL (ref 0.6–1.3)
DME PARACHUTE DELIVERY DATE ACTUAL: NORMAL
DME PARACHUTE DELIVERY DATE EXPECTED: NORMAL
DME PARACHUTE DELIVERY DATE REQUESTED: NORMAL
DME PARACHUTE ITEM DESCRIPTION: NORMAL
DME PARACHUTE ORDER STATUS: NORMAL
DME PARACHUTE SUPPLIER NAME: NORMAL
DME PARACHUTE SUPPLIER PHONE: NORMAL
EOSINOPHIL # BLD AUTO: 0.73 THOUSAND/ÂΜL (ref 0–0.61)
EOSINOPHIL NFR BLD AUTO: 7 % (ref 0–6)
ERYTHROCYTE [DISTWIDTH] IN BLOOD BY AUTOMATED COUNT: 20.9 % (ref 11.6–15.1)
FERRITIN SERPL-MCNC: 240 NG/ML (ref 11–307)
FOLATE SERPL-MCNC: >22.3 NG/ML
GFR SERPL CREATININE-BSD FRML MDRD: 65 ML/MIN/1.73SQ M
GLUCOSE P FAST SERPL-MCNC: 84 MG/DL (ref 65–99)
HCT VFR BLD AUTO: 39.7 % (ref 34.8–46.1)
HGB BLD-MCNC: 11.7 G/DL (ref 11.5–15.4)
IMM GRANULOCYTES # BLD AUTO: 0.1 THOUSAND/UL (ref 0–0.2)
IMM GRANULOCYTES NFR BLD AUTO: 1 % (ref 0–2)
IRON SATN MFR SERPL: 13 % (ref 15–50)
IRON SERPL-MCNC: 34 UG/DL (ref 50–212)
LYMPHOCYTES # BLD AUTO: 2.14 THOUSANDS/ÂΜL (ref 0.6–4.47)
LYMPHOCYTES NFR BLD AUTO: 20 % (ref 14–44)
MCH RBC QN AUTO: 25.6 PG (ref 26.8–34.3)
MCHC RBC AUTO-ENTMCNC: 29.5 G/DL (ref 31.4–37.4)
MCV RBC AUTO: 87 FL (ref 82–98)
MONOCYTES # BLD AUTO: 0.83 THOUSAND/ÂΜL (ref 0.17–1.22)
MONOCYTES NFR BLD AUTO: 8 % (ref 4–12)
NEUTROPHILS # BLD AUTO: 7 THOUSANDS/ÂΜL (ref 1.85–7.62)
NEUTS SEG NFR BLD AUTO: 63 % (ref 43–75)
NRBC BLD AUTO-RTO: 0 /100 WBCS
PLATELET # BLD AUTO: 540 THOUSANDS/UL (ref 149–390)
PMV BLD AUTO: 9.5 FL (ref 8.9–12.7)
POTASSIUM SERPL-SCNC: 4.2 MMOL/L (ref 3.5–5.3)
PROT SERPL-MCNC: 7.8 G/DL (ref 6.4–8.4)
RBC # BLD AUTO: 4.57 MILLION/UL (ref 3.81–5.12)
SODIUM SERPL-SCNC: 141 MMOL/L (ref 135–147)
TIBC SERPL-MCNC: 266 UG/DL (ref 250–450)
TRANSFERRIN SERPL-MCNC: 190 MG/DL (ref 203–362)
TSH SERPL DL<=0.05 MIU/L-ACNC: 2.06 UIU/ML (ref 0.45–4.5)
UIBC SERPL-MCNC: 232 UG/DL (ref 155–355)
VIT B12 SERPL-MCNC: 962 PG/ML (ref 180–914)
WBC # BLD AUTO: 10.86 THOUSAND/UL (ref 4.31–10.16)

## 2025-03-27 PROCEDURE — 80053 COMPREHEN METABOLIC PANEL: CPT

## 2025-03-27 PROCEDURE — 82607 VITAMIN B-12: CPT

## 2025-03-27 PROCEDURE — 84443 ASSAY THYROID STIM HORMONE: CPT

## 2025-03-27 PROCEDURE — 82306 VITAMIN D 25 HYDROXY: CPT

## 2025-03-27 PROCEDURE — 36415 COLL VENOUS BLD VENIPUNCTURE: CPT

## 2025-03-27 PROCEDURE — 83540 ASSAY OF IRON: CPT

## 2025-03-27 PROCEDURE — 85025 COMPLETE CBC W/AUTO DIFF WBC: CPT

## 2025-03-27 PROCEDURE — 83550 IRON BINDING TEST: CPT

## 2025-03-27 PROCEDURE — 82746 ASSAY OF FOLIC ACID SERUM: CPT

## 2025-03-27 PROCEDURE — 82728 ASSAY OF FERRITIN: CPT

## 2025-03-27 NOTE — PROGRESS NOTES
Jake received for chart review for ERIN pathway. Pt had PCP follow up on 3/25, repeated Chest Xray as well.  Labs to be done today by mobile labs. No utilization. For closure of ERIN episode on 4/1 and will place reminder for such.

## 2025-03-28 ENCOUNTER — TELEPHONE (OUTPATIENT)
Dept: CARDIOLOGY CLINIC | Facility: CLINIC | Age: 85
End: 2025-03-28

## 2025-03-28 ENCOUNTER — NURSE TRIAGE (OUTPATIENT)
Age: 85
End: 2025-03-28

## 2025-03-28 DIAGNOSIS — I48.0 PAROXYSMAL ATRIAL FIBRILLATION (HCC): Primary | ICD-10-CM

## 2025-03-28 RX ORDER — METOPROLOL SUCCINATE 25 MG/1
25 TABLET, EXTENDED RELEASE ORAL DAILY
Qty: 30 TABLET | Refills: 5 | Status: SHIPPED | OUTPATIENT
Start: 2025-03-28

## 2025-03-28 NOTE — TELEPHONE ENCOUNTER
Manual transmission pacemaker narrative with full report/PDF in EPIC:         NON-BILLABLE CARELINK TRANSMISSION: MANUAL PER NURSE TRIAGE FOR PATIENT C/O PALPITATIONS; PRESENTING EGRM SHOWING AS/AV @ AVG 78 BPM. BATTERY VOLTAGE ADEQUATE (11.7 YR). AP 80.1%  <0.1% (AAI-DDD 60 PPM). ALL AVAILABLE LEAD PARAMETERS WITHIN NORMAL LIMITS. 5 DEVICE CLASSIFIED VT-NS, 27 SVT EPISODES WITH 8 EPISODES DATED 3/27/25 WITH SVT ON EGM'S - MOST RECENT EPISODE 9 BEATS @  BPM, MAX EPISODE 11 MIN, 40 SECS @  BPM. 3 AT/AF EPISODES, MAX DURATION 4 MIN, 35 SECS. AT/AF BURDEN <0.1%. EF 65% (7/30/24 ECHO). HX: SAME & PATIENT ON ASA 81MG, WARFARIN, NO AV VINOD BLOCKERS. NORMAL DEVICE FUNCTION.   ES

## 2025-03-28 NOTE — TELEPHONE ENCOUNTER
"FOLLOW UP: No follow up scheduled.  Last visit was 1/9/25    REASON FOR CONVERSATION: Tachycardia Episode   Patient had two episodes yesterday and one the day before where she felt palpitations.  She does not monitor BP but used a pulse ox meter and saw that her HR was 146.  She said this lasted about one minute both times and then her HR returned to 66.  This also happened two days ago. She said she was not short of breath, not dizzy or lightheaded, but felt funny.  Today her HR is 66 on the pulse ox meter and her sat is 90 % on 3 liters continuous O2. She said she should be on 4 liters  She is not short of breath.      I asked her to send a transmission from her device. Milly in our device clinic will instruct her how to do so.      SYMPTOMS: Palpitation, no other symptoms    OTHER: I advised patient to report to the ED if HR is rapid and sustained and she is symptomatic.    DISPOSITION: Discuss With PCP and Callback by Nurse Today  Answer Assessment - Initial Assessment Questions  1. DESCRIPTION: \"Please describe your heart rate or heartbeat that you are having\" (e.g., fast/slow, regular/irregular, skipped or extra beats, \"palpitations\")      Patient had 3 episodes of rapid heart rate lasting for about a minute.  Two last night and one the day before  2. ONSET: \"When did it start?\" (e.g., minutes, hours, days)       Two days ago  3. DURATION: \"How long does it last\" (e.g., seconds, minutes, hours)      About a minute but patient is unsure      6. HEART RATE: \"Can you tell me your heart rate?\" \"How many beats in 15 seconds?\"  Note: Not all patients can do this.        HR went up to 146 on O2 sat meter.  7. RECURRENT SYMPTOM: \"Have you ever had this before?\" If Yes, ask: \"When was the last time?\" and \"What happened that time?\"       Patient states this has not happened before    9. CARDIAC HISTORY: \"Do you have any history of heart disease?\" (e.g., heart attack, angina, bypass surgery, angioplasty, arrhythmia)       " "LVH, PAF, CHF  10. OTHER SYMPTOMS: \"Do you have any other symptoms?\" (e.g., dizziness, chest pain, sweating, difficulty breathing)        Denies    Protocols used: Heart Rate and Heartbeat Questions-Adult-[place]    "

## 2025-03-28 NOTE — TELEPHONE ENCOUNTER
3/28/25 In coming call from POD, Judi transferred pt. Pt having issues with irregular HB and could we perform a manual check with her monitor.  Assisted pt w/manual check and advised tech to create report. BMc

## 2025-03-29 DIAGNOSIS — Z79.01 LONG TERM (CURRENT) USE OF ANTICOAGULANTS: ICD-10-CM

## 2025-03-29 DIAGNOSIS — I50.31 ACUTE DIASTOLIC CONGESTIVE HEART FAILURE (HCC): ICD-10-CM

## 2025-03-29 DIAGNOSIS — E78.5 HYPERLIPIDEMIA, UNSPECIFIED HYPERLIPIDEMIA TYPE: ICD-10-CM

## 2025-03-29 DIAGNOSIS — F41.8 DEPRESSION WITH ANXIETY: ICD-10-CM

## 2025-03-29 DIAGNOSIS — I48.0 PAROXYSMAL ATRIAL FIBRILLATION (HCC): ICD-10-CM

## 2025-03-29 DIAGNOSIS — E03.9 ACQUIRED HYPOTHYROIDISM: ICD-10-CM

## 2025-03-29 DIAGNOSIS — K21.00 GASTROESOPHAGEAL REFLUX DISEASE WITH ESOPHAGITIS: ICD-10-CM

## 2025-03-29 DIAGNOSIS — N39.41 URGE INCONTINENCE: ICD-10-CM

## 2025-03-29 RX ORDER — WARFARIN SODIUM 2.5 MG/1
TABLET ORAL
Qty: 32 TABLET | Refills: 10 | OUTPATIENT
Start: 2025-03-29

## 2025-03-29 RX ORDER — OMEPRAZOLE 40 MG/1
CAPSULE, DELAYED RELEASE ORAL
Qty: 60 CAPSULE | Refills: 10 | Status: SHIPPED | OUTPATIENT
Start: 2025-03-29

## 2025-03-29 RX ORDER — LEVOTHYROXINE SODIUM 50 UG/1
TABLET ORAL
Qty: 30 TABLET | Refills: 10 | Status: SHIPPED | OUTPATIENT
Start: 2025-03-29

## 2025-03-29 RX ORDER — SIMVASTATIN 40 MG
TABLET ORAL
Qty: 30 TABLET | Refills: 10 | Status: SHIPPED | OUTPATIENT
Start: 2025-03-29

## 2025-03-29 RX ORDER — OXYBUTYNIN CHLORIDE 5 MG/1
TABLET, EXTENDED RELEASE ORAL
Qty: 30 TABLET | Refills: 10 | Status: SHIPPED | OUTPATIENT
Start: 2025-03-29

## 2025-03-29 RX ORDER — SERTRALINE HYDROCHLORIDE 100 MG/1
TABLET, FILM COATED ORAL
Qty: 30 TABLET | Refills: 10 | Status: SHIPPED | OUTPATIENT
Start: 2025-03-29

## 2025-03-31 RX ORDER — METOPROLOL SUCCINATE 25 MG/1
TABLET, EXTENDED RELEASE ORAL
Qty: 90 TABLET | Refills: 1 | Status: ON HOLD | OUTPATIENT
Start: 2025-03-31 | End: 2025-04-11

## 2025-03-31 RX ORDER — FUROSEMIDE 40 MG/1
TABLET ORAL
Qty: 90 TABLET | Refills: 1 | Status: ON HOLD | OUTPATIENT
Start: 2025-03-31

## 2025-03-31 RX ORDER — ASPIRIN 81 MG
TABLET,CHEWABLE ORAL
Qty: 90 TABLET | Refills: 1 | Status: ON HOLD | OUTPATIENT
Start: 2025-03-31 | End: 2025-04-10

## 2025-04-01 ENCOUNTER — RESULTS FOLLOW-UP (OUTPATIENT)
Dept: CARDIOLOGY CLINIC | Facility: CLINIC | Age: 85
End: 2025-04-01

## 2025-04-01 ENCOUNTER — HOSPITAL ENCOUNTER (OUTPATIENT)
Dept: INFUSION CENTER | Facility: CLINIC | Age: 85
Discharge: HOME | End: 2025-04-01
Payer: MEDICARE

## 2025-04-01 ENCOUNTER — PATIENT OUTREACH (OUTPATIENT)
Dept: CASE MANAGEMENT | Facility: OTHER | Age: 85
End: 2025-04-01

## 2025-04-01 VITALS
SYSTOLIC BLOOD PRESSURE: 124 MMHG | RESPIRATION RATE: 20 BRPM | TEMPERATURE: 97.6 F | HEART RATE: 60 BPM | DIASTOLIC BLOOD PRESSURE: 60 MMHG

## 2025-04-01 DIAGNOSIS — D50.8 IRON DEFICIENCY ANEMIA SECONDARY TO INADEQUATE DIETARY IRON INTAKE: Primary | ICD-10-CM

## 2025-04-01 PROCEDURE — 96365 THER/PROPH/DIAG IV INF INIT: CPT

## 2025-04-01 RX ORDER — SODIUM CHLORIDE 9 MG/ML
20 INJECTION, SOLUTION INTRAVENOUS ONCE
Status: COMPLETED | OUTPATIENT
Start: 2025-04-01 | End: 2025-04-01

## 2025-04-01 RX ORDER — SODIUM CHLORIDE 9 MG/ML
20 INJECTION, SOLUTION INTRAVENOUS ONCE
Status: CANCELLED | OUTPATIENT
Start: 2025-04-08

## 2025-04-01 RX ADMIN — IRON SUCROSE 200 MG: 20 INJECTION, SOLUTION INTRAVENOUS at 12:49

## 2025-04-01 RX ADMIN — SODIUM CHLORIDE 20 ML/HR: 9 INJECTION, SOLUTION INTRAVENOUS at 12:46

## 2025-04-01 NOTE — PROGRESS NOTES
Jake received on pt for ERIN pathway closure today. Chart reviewed and pt has been contacted that her updated CXR showed resolution of her pneumonia. Also pt has received contact from cardiology regarding arrhythmia.Labs drawn on 3/27. Pt had infusion today.

## 2025-04-01 NOTE — PROGRESS NOTES
Marisol Otoole presents for Venofer, offers no complaints. PIV placed with positive blood return. Tolerated treatment well with no complications.      Marisol Otoole is aware of future appt on 4/8 at 11:30 am. PIV removed.    AVS printed and given to Marisol Otoole:

## 2025-04-03 ENCOUNTER — TELEPHONE (OUTPATIENT)
Age: 85
End: 2025-04-03

## 2025-04-03 ENCOUNTER — NURSE TRIAGE (OUTPATIENT)
Age: 85
End: 2025-04-03

## 2025-04-03 DIAGNOSIS — Z79.01 LONG TERM (CURRENT) USE OF ANTICOAGULANTS: ICD-10-CM

## 2025-04-03 DIAGNOSIS — G62.9 NEUROPATHY: ICD-10-CM

## 2025-04-03 DIAGNOSIS — L30.4 INTERTRIGO: Primary | ICD-10-CM

## 2025-04-03 DIAGNOSIS — M51.369 DDD (DEGENERATIVE DISC DISEASE), LUMBAR: ICD-10-CM

## 2025-04-03 RX ORDER — NYSTATIN 100000 [USP'U]/G
POWDER TOPICAL 2 TIMES DAILY
Qty: 60 G | Refills: 1 | Status: ON HOLD | OUTPATIENT
Start: 2025-04-03

## 2025-04-03 RX ORDER — PREGABALIN 25 MG/1
25 CAPSULE ORAL 2 TIMES DAILY
Qty: 60 CAPSULE | Refills: 0 | Status: CANCELLED | OUTPATIENT
Start: 2025-04-03

## 2025-04-03 NOTE — TELEPHONE ENCOUNTER
Diya also wanted to inform Diya pt started new medication prescribed from Cardiology: metoprolol succinate (TOPROL-XL) 25 mg 24 hr tablet     Janice reports since pt began taking it, she has been experiencing diarrhea.     Janice states they are keeping an eye on it and that Cardiology doc was also informed of this.

## 2025-04-03 NOTE — TELEPHONE ENCOUNTER
Janice, from [facility], called in to inform Diya, pt is showing irritation underneath her breasts & abdominal folds.     Requesting if Diya could please send a script for: NYSTATIN POWDER?    Script going to the following pharmacy:  [id number] [place] - [address] [phone]

## 2025-04-03 NOTE — TELEPHONE ENCOUNTER
Reason for call:   [x] Refill   [] Prior Auth  [] Other:     Office:   [] PCP/Provider -   [x] Specialty/Provider - cardio/[place]    Medication: Warfarin 2.5mg    Dose/Frequency: 1 tab mon to sat and 2 on sunday    Quantity: 102    [id number] [phone]     Mail Away Pharmacy   Does the patient have enough for 10 days?   [] Yes   [x] No - Send as HP to POD

## 2025-04-03 NOTE — TELEPHONE ENCOUNTER
Reason for call:   [x] Refill   [] Prior Auth  [] Other:     Office:   [] PCP/Provider -   [x] Specialty/Provider - SPA/Manoj    Medication: Pregabalin 25mg    Dose/Frequency: 1 cap bid    Quantity: 60    Pharmacy: [facility] [phone]     Mail Away Pharmacy   Does the patient have enough for 10 days?   [] Yes   [x] No - Send as HP to POD

## 2025-04-03 NOTE — TELEPHONE ENCOUNTER
"Received a call from the patient's home care RN reporting that the patient has been having diarrhea since she started the Metoprolol  Succinate 25mg 3/28 for SVT episodes (see triage) from same date. Denies it could be anything else. She needed to start taking imodium for this.  Vital signs are still OK. BP today 118/70 and HR 61.     Advised the patient would inform the provider and would call back with recommendations.       Answer Assessment - Initial Assessment Questions  1. NAME of MEDICINE: \"What medicine(s) are you calling about?\"      Metoprolol Succinate 25mg   2. QUESTION: \"What is your question?\" (e.g., double dose of medicine, side effect)      Side effect   3. PRESCRIBER: \"Who prescribed the medicine?\" Reason: if prescribed by specialist, call should be referred to that group.      Dr. Esteves   4. SYMPTOMS: \"Do you have any symptoms?\" If Yes, ask: \"What symptoms are you having?\"  \"How bad are the symptoms (e.g., mild, moderate, severe)      Pt reports Diarrhea since she started it to the point of  taking imodium    Protocols used: Medication Question Call-Adult-[place]    "

## 2025-04-04 RX ORDER — WARFARIN SODIUM 2.5 MG/1
TABLET ORAL
Qty: 102 TABLET | Refills: 0 | Status: ON HOLD | OUTPATIENT
Start: 2025-04-04

## 2025-04-07 NOTE — TELEPHONE ENCOUNTER
Please schedule ov when pt calls back for med refill with MG    Attempted to reach pt again, mailbox full

## 2025-04-09 ENCOUNTER — TELEPHONE (OUTPATIENT)
Age: 85
End: 2025-04-09

## 2025-04-09 NOTE — TELEPHONE ENCOUNTER
Previous telephone encounter from 04/03/2025-  Pt having diarrhea since starting medication Metoprolol on 03/28/2025    Please advise

## 2025-04-09 NOTE — TELEPHONE ENCOUNTER
Caller: Marisol Otoloe    Doctor: Dr. Esteves    Reason for call: She is having side effects from metoprolol, diarrhea and upset stomach. Please call her.    Thank you      Call back#: [phone]

## 2025-04-10 ENCOUNTER — HOSPITAL ENCOUNTER (INPATIENT)
Facility: HOSPITAL | Age: 85
LOS: 1 days | Discharge: HOME HEALTH | DRG: 309 | End: 2025-04-12
Attending: EMERGENCY MEDICINE | Admitting: STUDENT IN AN ORGANIZED HEALTH CARE EDUCATION/TRAINING PROGRAM
Payer: MEDICARE

## 2025-04-10 ENCOUNTER — APPOINTMENT (EMERGENCY)
Dept: RADIOLOGY | Facility: HOSPITAL | Age: 85
DRG: 309 | End: 2025-04-10
Payer: MEDICARE

## 2025-04-10 ENCOUNTER — APPOINTMENT (EMERGENCY)
Dept: CT IMAGING | Facility: HOSPITAL | Age: 85
DRG: 309 | End: 2025-04-10
Payer: MEDICARE

## 2025-04-10 ENCOUNTER — HOSPITAL ENCOUNTER (OUTPATIENT)
Dept: INFUSION CENTER | Facility: CLINIC | Age: 85
Discharge: HOME | End: 2025-04-10

## 2025-04-10 ENCOUNTER — NURSE TRIAGE (OUTPATIENT)
Age: 85
End: 2025-04-10

## 2025-04-10 DIAGNOSIS — I48.0 PAROXYSMAL ATRIAL FIBRILLATION (HCC): ICD-10-CM

## 2025-04-10 DIAGNOSIS — Z95.0 CARDIAC PACEMAKER: ICD-10-CM

## 2025-04-10 DIAGNOSIS — E83.42 HYPOMAGNESEMIA: ICD-10-CM

## 2025-04-10 DIAGNOSIS — R79.1 SUPRATHERAPEUTIC INR: ICD-10-CM

## 2025-04-10 DIAGNOSIS — I49.5 SSS (SICK SINUS SYNDROME) (HCC): ICD-10-CM

## 2025-04-10 DIAGNOSIS — Z95.0 PRESENCE OF PERMANENT CARDIAC PACEMAKER: ICD-10-CM

## 2025-04-10 DIAGNOSIS — I47.19 PAROXYSMAL JUNCTIONAL TACHYCARDIA (HCC): ICD-10-CM

## 2025-04-10 DIAGNOSIS — M51.369 DDD (DEGENERATIVE DISC DISEASE), LUMBAR: ICD-10-CM

## 2025-04-10 DIAGNOSIS — R00.2 PALPITATIONS: Primary | ICD-10-CM

## 2025-04-10 DIAGNOSIS — E87.6 HYPOKALEMIA: ICD-10-CM

## 2025-04-10 DIAGNOSIS — G62.9 NEUROPATHY: ICD-10-CM

## 2025-04-10 DIAGNOSIS — I47.10 SVT (SUPRAVENTRICULAR TACHYCARDIA) (HCC): ICD-10-CM

## 2025-04-10 PROBLEM — F32.A DEPRESSION: Status: ACTIVE | Noted: 2021-03-08

## 2025-04-10 PROBLEM — Z86.59 HISTORY OF DEPRESSION: Status: ACTIVE | Noted: 2021-03-08

## 2025-04-10 LAB
2HR DELTA HS TROPONIN: 14 NG/L
4HR DELTA HS TROPONIN: 12 NG/L
ALBUMIN SERPL BCG-MCNC: 3.2 G/DL (ref 3.5–5)
ALP SERPL-CCNC: 96 U/L (ref 34–104)
ALT SERPL W P-5'-P-CCNC: 16 U/L (ref 7–52)
ANION GAP SERPL CALCULATED.3IONS-SCNC: 6 MMOL/L (ref 4–13)
APTT PPP: 89 SECONDS (ref 23–34)
AST SERPL W P-5'-P-CCNC: 24 U/L (ref 13–39)
BACTERIA UR QL AUTO: ABNORMAL /HPF
BASOPHILS # BLD AUTO: 0.06 THOUSANDS/ÂΜL (ref 0–0.1)
BASOPHILS NFR BLD AUTO: 1 % (ref 0–1)
BILIRUB DIRECT SERPL-MCNC: 0.04 MG/DL (ref 0–0.2)
BILIRUB SERPL-MCNC: 0.25 MG/DL (ref 0.2–1)
BILIRUB UR QL STRIP: NEGATIVE
BNP SERPL-MCNC: 201 PG/ML (ref 0–100)
BUN SERPL-MCNC: 17 MG/DL (ref 5–25)
CALCIUM SERPL-MCNC: 9.7 MG/DL (ref 8.4–10.2)
CARDIAC TROPONIN I PNL SERPL HS: 13 NG/L (ref ?–50)
CARDIAC TROPONIN I PNL SERPL HS: 25 NG/L (ref ?–50)
CARDIAC TROPONIN I PNL SERPL HS: 27 NG/L (ref ?–50)
CHLORIDE SERPL-SCNC: 105 MMOL/L (ref 96–108)
CLARITY UR: CLEAR
CO2 SERPL-SCNC: 28 MMOL/L (ref 21–32)
COLOR UR: ABNORMAL
CREAT SERPL-MCNC: 0.68 MG/DL (ref 0.6–1.3)
EOSINOPHIL # BLD AUTO: 0.48 THOUSAND/ÂΜL (ref 0–0.61)
EOSINOPHIL NFR BLD AUTO: 4 % (ref 0–6)
ERYTHROCYTE [DISTWIDTH] IN BLOOD BY AUTOMATED COUNT: 20.1 % (ref 11.6–15.1)
GFR SERPL CREATININE-BSD FRML MDRD: 80 ML/MIN/1.73SQ M
GLUCOSE SERPL-MCNC: 104 MG/DL (ref 65–140)
GLUCOSE UR STRIP-MCNC: NEGATIVE MG/DL
HCT VFR BLD AUTO: 32.4 % (ref 34.8–46.1)
HGB BLD-MCNC: 9.9 G/DL (ref 11.5–15.4)
HGB UR QL STRIP.AUTO: ABNORMAL
IMM GRANULOCYTES # BLD AUTO: 0.09 THOUSAND/UL (ref 0–0.2)
IMM GRANULOCYTES NFR BLD AUTO: 1 % (ref 0–2)
INR PPP: 3.71 (ref 0.85–1.19)
KETONES UR STRIP-MCNC: NEGATIVE MG/DL
LEUKOCYTE ESTERASE UR QL STRIP: NEGATIVE
LYMPHOCYTES # BLD AUTO: 1.81 THOUSANDS/ÂΜL (ref 0.6–4.47)
LYMPHOCYTES NFR BLD AUTO: 16 % (ref 14–44)
MAGNESIUM SERPL-MCNC: 1.4 MG/DL (ref 1.9–2.7)
MCH RBC QN AUTO: 26 PG (ref 26.8–34.3)
MCHC RBC AUTO-ENTMCNC: 30.6 G/DL (ref 31.4–37.4)
MCV RBC AUTO: 85 FL (ref 82–98)
MONOCYTES # BLD AUTO: 0.79 THOUSAND/ÂΜL (ref 0.17–1.22)
MONOCYTES NFR BLD AUTO: 7 % (ref 4–12)
NEUTROPHILS # BLD AUTO: 7.88 THOUSANDS/ÂΜL (ref 1.85–7.62)
NEUTS SEG NFR BLD AUTO: 71 % (ref 43–75)
NITRITE UR QL STRIP: NEGATIVE
NON-SQ EPI CELLS URNS QL MICRO: ABNORMAL /HPF
NRBC BLD AUTO-RTO: 0 /100 WBCS
PH UR STRIP.AUTO: 5.5 [PH]
PLATELET # BLD AUTO: 439 THOUSANDS/UL (ref 149–390)
PMV BLD AUTO: 9 FL (ref 8.9–12.7)
POTASSIUM SERPL-SCNC: 3.3 MMOL/L (ref 3.5–5.3)
PROT SERPL-MCNC: 6.5 G/DL (ref 6.4–8.4)
PROT UR STRIP-MCNC: ABNORMAL MG/DL
PROTHROMBIN TIME: 37.2 SECONDS (ref 12.3–15)
RBC # BLD AUTO: 3.81 MILLION/UL (ref 3.81–5.12)
RBC #/AREA URNS AUTO: ABNORMAL /HPF
SODIUM SERPL-SCNC: 139 MMOL/L (ref 135–147)
SP GR UR STRIP.AUTO: 1.01 (ref 1–1.03)
TSH SERPL DL<=0.05 MIU/L-ACNC: 1.5 UIU/ML (ref 0.45–4.5)
UROBILINOGEN UR STRIP-ACNC: <2 MG/DL
WBC # BLD AUTO: 11.11 THOUSAND/UL (ref 4.31–10.16)
WBC #/AREA URNS AUTO: ABNORMAL /HPF

## 2025-04-10 PROCEDURE — 93005 ELECTROCARDIOGRAM TRACING: CPT

## 2025-04-10 PROCEDURE — 71045 X-RAY EXAM CHEST 1 VIEW: CPT

## 2025-04-10 PROCEDURE — 85610 PROTHROMBIN TIME: CPT | Performed by: EMERGENCY MEDICINE

## 2025-04-10 PROCEDURE — 83735 ASSAY OF MAGNESIUM: CPT | Performed by: EMERGENCY MEDICINE

## 2025-04-10 PROCEDURE — 96368 THER/DIAG CONCURRENT INF: CPT

## 2025-04-10 PROCEDURE — 71275 CT ANGIOGRAPHY CHEST: CPT

## 2025-04-10 PROCEDURE — 96366 THER/PROPH/DIAG IV INF ADDON: CPT

## 2025-04-10 PROCEDURE — 74177 CT ABD & PELVIS W/CONTRAST: CPT

## 2025-04-10 PROCEDURE — 99285 EMERGENCY DEPT VISIT HI MDM: CPT

## 2025-04-10 PROCEDURE — 85025 COMPLETE CBC W/AUTO DIFF WBC: CPT | Performed by: EMERGENCY MEDICINE

## 2025-04-10 PROCEDURE — 83880 ASSAY OF NATRIURETIC PEPTIDE: CPT | Performed by: EMERGENCY MEDICINE

## 2025-04-10 PROCEDURE — 85730 THROMBOPLASTIN TIME PARTIAL: CPT | Performed by: EMERGENCY MEDICINE

## 2025-04-10 PROCEDURE — 80076 HEPATIC FUNCTION PANEL: CPT | Performed by: EMERGENCY MEDICINE

## 2025-04-10 PROCEDURE — 96365 THER/PROPH/DIAG IV INF INIT: CPT

## 2025-04-10 PROCEDURE — 84484 ASSAY OF TROPONIN QUANT: CPT | Performed by: EMERGENCY MEDICINE

## 2025-04-10 PROCEDURE — 84443 ASSAY THYROID STIM HORMONE: CPT | Performed by: EMERGENCY MEDICINE

## 2025-04-10 PROCEDURE — 36415 COLL VENOUS BLD VENIPUNCTURE: CPT | Performed by: EMERGENCY MEDICINE

## 2025-04-10 PROCEDURE — 99223 1ST HOSP IP/OBS HIGH 75: CPT | Performed by: STUDENT IN AN ORGANIZED HEALTH CARE EDUCATION/TRAINING PROGRAM

## 2025-04-10 PROCEDURE — 81001 URINALYSIS AUTO W/SCOPE: CPT | Performed by: EMERGENCY MEDICINE

## 2025-04-10 PROCEDURE — 80048 BASIC METABOLIC PNL TOTAL CA: CPT | Performed by: EMERGENCY MEDICINE

## 2025-04-10 RX ORDER — PANTOPRAZOLE SODIUM 40 MG/1
40 TABLET, DELAYED RELEASE ORAL
Status: DISCONTINUED | OUTPATIENT
Start: 2025-04-11 | End: 2025-04-12 | Stop reason: HOSPADM

## 2025-04-10 RX ORDER — SERTRALINE HYDROCHLORIDE 100 MG/1
100 TABLET, FILM COATED ORAL DAILY
Status: DISCONTINUED | OUTPATIENT
Start: 2025-04-11 | End: 2025-04-12 | Stop reason: HOSPADM

## 2025-04-10 RX ORDER — LEVOTHYROXINE SODIUM 50 UG/1
50 TABLET ORAL
Status: DISCONTINUED | OUTPATIENT
Start: 2025-04-11 | End: 2025-04-12 | Stop reason: HOSPADM

## 2025-04-10 RX ORDER — METOPROLOL SUCCINATE 25 MG/1
25 TABLET, EXTENDED RELEASE ORAL DAILY
Status: DISCONTINUED | OUTPATIENT
Start: 2025-04-11 | End: 2025-04-11

## 2025-04-10 RX ORDER — NYSTATIN 100000 [USP'U]/G
POWDER TOPICAL 2 TIMES DAILY
Status: DISCONTINUED | OUTPATIENT
Start: 2025-04-10 | End: 2025-04-12 | Stop reason: HOSPADM

## 2025-04-10 RX ORDER — PREGABALIN 25 MG/1
25 CAPSULE ORAL 2 TIMES DAILY
Qty: 60 CAPSULE | Refills: 0 | Status: CANCELLED | OUTPATIENT
Start: 2025-04-10

## 2025-04-10 RX ORDER — MAGNESIUM SULFATE HEPTAHYDRATE 40 MG/ML
4 INJECTION, SOLUTION INTRAVENOUS ONCE
Status: COMPLETED | OUTPATIENT
Start: 2025-04-10 | End: 2025-04-10

## 2025-04-10 RX ORDER — PRAVASTATIN SODIUM 40 MG
40 TABLET ORAL
Status: DISCONTINUED | OUTPATIENT
Start: 2025-04-11 | End: 2025-04-12 | Stop reason: HOSPADM

## 2025-04-10 RX ORDER — FUROSEMIDE 40 MG/1
40 TABLET ORAL DAILY
Status: DISCONTINUED | OUTPATIENT
Start: 2025-04-11 | End: 2025-04-12 | Stop reason: HOSPADM

## 2025-04-10 RX ORDER — POTASSIUM CHLORIDE 14.9 MG/ML
20 INJECTION INTRAVENOUS ONCE
Status: COMPLETED | OUTPATIENT
Start: 2025-04-10 | End: 2025-04-10

## 2025-04-10 RX ORDER — POTASSIUM CHLORIDE 1500 MG/1
40 TABLET, EXTENDED RELEASE ORAL ONCE
Status: COMPLETED | OUTPATIENT
Start: 2025-04-10 | End: 2025-04-10

## 2025-04-10 RX ORDER — ASPIRIN 81 MG/1
81 TABLET ORAL DAILY
Status: DISCONTINUED | OUTPATIENT
Start: 2025-04-11 | End: 2025-04-12 | Stop reason: HOSPADM

## 2025-04-10 RX ORDER — METOPROLOL SUCCINATE 25 MG/1
12.5 TABLET, EXTENDED RELEASE ORAL ONCE
Status: DISCONTINUED | OUTPATIENT
Start: 2025-04-10 | End: 2025-04-11

## 2025-04-10 RX ORDER — PREGABALIN 25 MG/1
25 CAPSULE ORAL 2 TIMES DAILY
Status: DISCONTINUED | OUTPATIENT
Start: 2025-04-10 | End: 2025-04-12 | Stop reason: HOSPADM

## 2025-04-10 RX ORDER — OXYBUTYNIN CHLORIDE 5 MG/1
5 TABLET, EXTENDED RELEASE ORAL DAILY
Status: DISCONTINUED | OUTPATIENT
Start: 2025-04-11 | End: 2025-04-12 | Stop reason: HOSPADM

## 2025-04-10 RX ADMIN — POTASSIUM CHLORIDE 40 MEQ: 1500 TABLET, EXTENDED RELEASE ORAL at 17:48

## 2025-04-10 RX ADMIN — PREGABALIN 25 MG: 25 CAPSULE ORAL at 22:09

## 2025-04-10 RX ADMIN — IOHEXOL 85 ML: 350 INJECTION, SOLUTION INTRAVENOUS at 16:35

## 2025-04-10 RX ADMIN — POTASSIUM CHLORIDE 20 MEQ: 14.9 INJECTION, SOLUTION INTRAVENOUS at 17:43

## 2025-04-10 RX ADMIN — MAGNESIUM SULFATE HEPTAHYDRATE 4 G: 40 INJECTION, SOLUTION INTRAVENOUS at 17:38

## 2025-04-10 NOTE — TELEPHONE ENCOUNTER
Spoke to pt and relayed Dr. Esteves's response, pt verbalized understanding.    Pt wanted to know if there's an alternative medication she can take in place of Metoprolol?  Pt is concerned that if she stops this medication, her HR will be affected.

## 2025-04-10 NOTE — TELEPHONE ENCOUNTER
Please attempt to contact pt to come in for OV  Clinical attempted to contact pt last week and Monday, VM was full  Last seen in July 2024  Needs OV for refill

## 2025-04-10 NOTE — TELEPHONE ENCOUNTER
Medication helped patient with her legs and back.     Reason for call:   [x] Refill   [] Prior Auth  [] Other:     Office:   [] PCP/Provider -   [x] Specialty/Provider -  [facility] [place]  Authorized By: MABEL Oliveira      Medication: pregabalin (LYRICA) 25 mg capsule    Dose/Frequency: Take 1 capsule (25 mg total) by mouth 2 (two) times a day,    Quantity: 60 capsule    Pharmacy: [id number] - [place] - [address] [facility]   Does the patient have enough for 3 days?   [] Yes   [x] No - Send as HP to POD    Mail Away Pharmacy   Does the patient have enough for 10 days?   [] Yes   [] No - Send as HP to POD

## 2025-04-10 NOTE — ASSESSMENT & PLAN NOTE
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 Last BM was on 4/7 , no BM on 4/8 - 4/10 despite being on Toprol 25 mg still on 4/7-4/09 , and she denies any palpitations on these days till 4/10.     In the ED , CT PE w AP was performed before admission \"No acute findings in the chest, abdomen or pelvis.\"    PLAN   - increase Toprol XL back to 25 mg QD   - observe on Tele   - monitor vitals   - cardiology consultation   - electrolytes optimization, received K and Mg in the ED , recheck in AM   "

## 2025-04-10 NOTE — TELEPHONE ENCOUNTER
"Reason for Disposition   Feeling weak or lightheaded (e.g., woozy, feeling like they might faint) AND heart beating very rapidly (e.g., > 140 / minute)    Answer Assessment - Initial Assessment Questions  1. DESCRIPTION: \"Please describe your heart rate or heartbeat that you are having\" (e.g., fast/slow, regular/irregular, skipped or extra beats, \"palpitations\")      Heart rate on pulse ox meter is 149-150  2. ONSET: \"When did it start?\" (e.g., minutes, hours, days)        \"Hasn't been feeling well for a couple of days\"  3. DURATION: \"How long does it last\" (e.g., seconds, minutes, hours)      Continuous  4. PATTERN \"Does it come and go, or has it been constant since it started?\"  \"Does it get worse with exertion?\"   \"Are you feeling it now?\"      Constant         7. RECURRENT SYMPTOM: \"Have you ever had this before?\" If Yes, ask: \"When was the last time?\" and \"What happened that time?\"       History of tachycardia   8. CAUSE: \"What do you think is causing the palpitations?\"      Unsure  9. CARDIAC HISTORY: \"Do you have any history of heart disease?\" (e.g., heart attack, angina, bypass surgery, angioplasty, arrhythmia)       Patient has pacemaker  10. OTHER SYMPTOMS: \"Do you have any other symptoms?\" (e.g., dizziness, chest pain, sweating, difficulty breathing)        O2 sat 92%  Hasn't been feeling well for a few days but no specific symptoms   Patient's friend is there with her and will call 911    Protocols used: Heart Rate and Heartbeat Questions-Adult-[place]    "

## 2025-04-10 NOTE — ASSESSMENT & PLAN NOTE
"Wt Readings from Last 3 Encounters:   03/03/25 81.3 kg (179 lb 3.7 oz)   01/31/25 79.8 kg (175 lb 14.8 oz)   01/29/25 79.8 kg (175 lb 14.8 oz)     Euvolemic on exam   On daily lasix 40 mg , \"sometimes take half \",  continue   In and out   Daily weight   Low salt diet and 2 L fluid restriction       "

## 2025-04-10 NOTE — ASSESSMENT & PLAN NOTE
Latest Reference Range & Units 03/13/25 05:48 03/20/25 07:12 03/27/25 08:31 04/10/25 15:07   POCT INR 0.85 - 1.19  2.2 (E) 4.87 (H) 2.69 (H) 3.71 (H)     INR goal 2-3   Current dose : 2.5 mg daily     PLAN   Hold coumadin potentially till 4/12 evening, then restart on 2.5 mg daily , potentially M-S and hold off on Sunday.  recheck INR , and continue to follow with coumadin clinic op

## 2025-04-10 NOTE — H&P
" H&P - Hospitalist   Name: Marisol Otoole 84 y.o. female I MRN: [id number]  Unit/Bed#: 2 E 256-01 I Date of Admission: 4/10/2025   Date of Service: 4/10/2025 I Hospital Day: 0     Assessment & Plan  Heart palpitations  Hx of TAVR and AV block , has PPM in place    Felt palpitations back in 3/2025 for which cardiology put her on Toprol XL 25 mg QD     4/10 felt similar palpitations and called EMS , pre ED strip \"\"tachycardia and accelerated junctional rhythm with ST depressions\" then converted to NSR, paced at 60 BPM afterward and while in the ED , with /51     Per patient, on Monday 4/7 she had 3 loose bowel movements , and there was suspected association of Toprol causing it for which on 4/10 she decreased Toprol XL down to 12.5 mg (took half a table of her 25 mg). Last BM was on 4/7 , no BM on 4/8 - 4/10 despite being on Toprol 25 mg still on 4/7-4/09 , and she denies any palpitations on these days till 4/10.     In the ED , CT PE w AP was performed before admission \"No acute findings in the chest, abdomen or pelvis.\"    PLAN   - increase Toprol XL back to 25 mg QD   - observe on Tele   - monitor vitals   - cardiology consultation   - electrolytes optimization, received K and Mg in the ED , recheck in AM   Supratherapeutic INR   Latest Reference Range & Units 03/13/25 05:48 03/20/25 07:12 03/27/25 08:31 04/10/25 15:07   POCT INR 0.85 - 1.19  2.2 (E) 4.87 (H) 2.69 (H) 3.71 (H)     INR goal 2-3   Current dose : 2.5 mg daily     PLAN   Hold coumadin potentially till 4/12 evening, then restart on 2.5 mg daily , potentially M-S and hold off on Sunday.  recheck INR , and continue to follow with coumadin clinic op    GERD (gastroesophageal reflux disease)  On Omeprazole 40 mg QD --- substitute with Protonix while inpatient   Acquired hypothyroidism   Latest Reference Range & Units 04/10/25 15:07   TSH 3RD GENERATON 0.450 - 4.500 uIU/mL 1.496     Continue Levothyroxine 50 mcg QD   Hyperlipidemia  On Simvastatin 40 mg " "QD , restart on discharge   Pulmonary hypertension (HCC)  On 4 L NC chronically, continue while inpatient   Chronic heart failure with preserved ejection fraction (HFpEF) (HCC)  Wt Readings from Last 3 Encounters:   03/03/25 81.3 kg (179 lb 3.7 oz)   01/31/25 79.8 kg (175 lb 14.8 oz)   01/29/25 79.8 kg (175 lb 14.8 oz)     Euvolemic on exam   On daily lasix 40 mg , \"sometimes take half \",  continue   In and out   Daily weight   Low salt diet and 2 L fluid restriction       History of transcatheter aortic valve replacement (TAVR)  On Aspirin , Statin , continue   On Coumadin - see supra therapeutic INR A&P   Coronary artery disease involving native coronary artery of native heart without angina pectoris  Continue ASA and statin   Denies CP   History of depression  Continue Sertraline 100 mg QD       VTE Pharmacologic Prophylaxis:   Moderate Risk (Score 3-4) - Pharmacological DVT Prophylaxis Ordered: warfarin (Coumadin).  Code Status: Level 3 - DNAR and DNI noted her 's wife Phan Hurtado is her POA   Discussion with family: Patient declined call to .     Anticipated Length of Stay: Patient will be admitted on an observation basis with an anticipated length of stay of less than 2 midnights secondary to palpitation , obs on tele, cardiology eval.    History of Present Illness   Chief Complaint: palpitations     Marisol Otoole is a 84 y.o. female with a PMH of Depression, Pulmonary HTN, COPD, on 4 L NC chronic, GERD, TAVR hx , CAD hx , HTN, HLD, hypothyroidism , AVB /SSS with PPM in place who presents with palpitations x ~ 30 minutes     Per patient, on Monday 4/7 she had 3 loose bowel movements , and there was suspected association of Toprol causing it for which on 4/10 she decreased Toprol XL down to 12.5 mg (took half a table of her 25 mg). Last BM was on 4/7 , no BM on 4/8 - 4/10 despite being on Toprol 25 mg still on 4/7-4/09 , and she denies any palpitations on these days till 4/10.     EMS " brought to the ED as above , Mg and K supplements given, patient in the ED in paced rhythm 60 BPM, at baseline 4 L NC denies any symptoms at time of exam. She provided and confirmed above hx, reviewed her home meds , and she confirmed DNR DNI . Patient agrees to above A&P as outlined.     Review of Systems    Historical Information   Past Medical History:   Diagnosis Date    Anemia 08/22/2018    Anxiety     Arthritis     AVB (atrioventricular block)     first degree    Cataract     CHF (congestive heart failure) (Formerly [facility])     COPD, mild (HCC)     Coronary artery disease     Dislocation of right shoulder joint     Frequent UTI     GERD (gastroesophageal reflux disease)     H/O: pneumonia     Heme positive stool     Hyperlipidemia     Hypertension     Hypothyroidism     Morbid obesity with BMI of 50.0-59.9, adult (Formerly [facility] [place])     Obesity, morbid ([facility]) 08/22/2018    JANICE on CPAP     Pulmonary hypertension ([facility]) 08/22/2018    Severe aortic stenosis     Simple goiter     Skin cyst     within the armpits, right    Wears glasses      Past Surgical History:   Procedure Laterality Date    BREAST BIOPSY      CARDIAC CATHETERIZATION      CARDIAC ELECTROPHYSIOLOGY PROCEDURE N/A 8/12/2024    Procedure: Cardiac pacer implant;  Surgeon: Clarke Zuluaga MD;  Location:  CARDIAC CATH LAB;  Service: Cardiology    CARPAL TUNNEL RELEASE Bilateral     CHOLECYSTECTOMY      DILATION AND CURETTAGE OF UTERUS      HYSTEROSCOPY      MASTOID SURGERY      NH COLONOSCOPY FLX DX W/COLLJ SPEC WHEN PFRMD N/A 9/6/2018    Procedure: COLONOSCOPY;  Surgeon: Shree Sosa III, MD;  Location: [facility];  Service: Gastroenterology    NH ECHO TRANSESOPHAG R-T 2D W/PRB IMG ACQUISJ I&R N/A 10/9/2018    Procedure: INTRA-OP TRANSESOPHAGEAL ECHOCARDIOGRAM (GARRISON);  Surgeon: Kushal Camarena DO;  Location:  MAIN OR;  Service: Cardiac Surgery    NH ESOPHAGOGASTRODUODENOSCOPY TRANSORAL DIAGNOSTIC N/A 8/31/2018    Procedure: ESOPHAGOGASTRODUODENOSCOPY (EGD);  Surgeon:  Shree Sosa III, MD;  Location: [facility];  Service: Gastroenterology    DC REPLACE AORTIC VALVE OPENFEMORAL ARTERY APPROACH N/A 10/9/2018    Procedure: REPLACEMENT AORTIC VALVE TRANSCATHETER (TAVR) TRANSFEMORAL W/ 23 MM MENDOZA NOE S3 VALVE (ACCESS OF LEFT);  Surgeon: Kushal Camarena DO;  Location: [facility];  Service: Cardiac Surgery    TOTAL HIP ARTHROPLASTY Left 2007    TOTAL KNEE ARTHROPLASTY Bilateral      Social History     Tobacco Use    Smoking status: Never     Passive exposure: Never    Smokeless tobacco: Never   Vaping Use    Vaping status: Never Used   Substance and Sexual Activity    Alcohol use: Not Currently    Drug use: Never    Sexual activity: Never     E-Cigarette/Vaping    E-Cigarette Use Never User      E-Cigarette/Vaping Substances    Nicotine No     THC No     CBD No     Flavoring No     Other No     Unknown No      Family History   Problem Relation Age of Onset    Diabetes Mother     Stroke Mother     Cancer Father     Lung cancer Father     Diabetes Sister     Heart disease Sister     Hypertension Sister     Coronary artery disease Family     Diabetes Family     Hypertension Family     Cancer Family     Stroke Family     Thyroid disease Neg Hx      Social History:  Marital Status: Single   Occupation: retired   Patient Pre-hospital Living Situation: Home  Patient Pre-hospital Level of Mobility: walks with walker denies any changes   Patient Pre-hospital Diet Restrictions: none     Meds/Allergies   I have reviewed home medications with patient personally.  Prior to Admission medications    Medication Sig Start Date End Date Taking? Authorizing Provider   acetaminophen (TYLENOL) 500 mg tablet Take 500 mg by mouth every 6 (six) hours as needed    Historical Provider, MD   aspirin (Aspirin Low Dose) 81 mg chewable tablet CHEW AND SWALLOW 1 TABLET(S) BY MOUTH 1 TIMES PER DAY *NEW PRESCRIPTION REQUEST* 3/31/25   Bro Esteves MD   aspirin (ECOTRIN LOW STRENGTH) 81 mg EC tablet Take  1 tablet (81 mg total) by mouth daily 10/11/18   Fatou Schmitz PA-C   b complex vitamins capsule Take 1 capsule by mouth 2 (two) times a day      Historical Provider, MD   benzonatate (TESSALON PERLES) 100 mg capsule Take 1 capsule (100 mg total) by mouth 3 (three) times a day as needed for cough 3/4/25   MABEL De Anda   Blood Glucose Monitoring Suppl (OneTouch Verio Reflect) w/Device KIT Check blood sugars once daily. Please substitute with appropriate alternative as covered by patient's insurance. Dx: E11.65 6/28/24   MABEL Donahue   Calcium Carb-Cholecalciferol (CALCIUM 600 + D PO) Take 1 tablet by mouth 2 (two) times a day    Historical Provider, MD   Cranberry 250 MG TABS Take by mouth    Historical Provider, MD   fluticasone (FLONASE) 50 mcg/act nasal spray 1 spray into each nostril daily 8/22/24   MABEL Hallman   furosemide (LASIX) 40 mg tablet TAKE 1 TABLET BY MOUTH EVERY DAY *NEW PRESCRIPTION REQUEST* 3/31/25   Bro Esteves MD   glucose blood (OneTouch Verio) test strip Check blood sugars once daily. Please substitute with appropriate alternative as covered by patient's insurance. Dx: E11.65 6/28/24   MABEL Donahue   Lancets (onetouch ultrasoft) lancets Use in the morning Use as instructed 6/28/24   MABEL Donahue   levothyroxine 50 mcg tablet TAKE 1 TABLET BY MOUTH EVERY DAY *NEW PRESCRIPTION REQUEST* 3/29/25   MABEL Hallman   metoprolol succinate (TOPROL-XL) 25 mg 24 hr tablet TAKE 1 TABLET(S) BY MOUTH 1 TIMES PER DAY *NEW PRESCRIPTION REQUEST* 3/31/25   Bro Esteves MD   nystatin (MYCOSTATIN) powder Apply topically 2 (two) times a day 4/3/25   MABEL Hallman   omeprazole (PriLOSEC) 40 MG capsule TAKE 1 CAPSULE BY MOUTH TWICE A DAY *NEW PRESCRIPTION REQUEST* 3/29/25   MABEL Hallman Delica Lancets 33G MISC Check blood sugars once daily. Please substitute with appropriate alternative as covered by patient's insurance. Dx: E11.65 6/28/24    MABEL Donahue   oxybutynin (DITROPAN-XL) 5 mg 24 hr tablet TAKE 1 TABLET (5 MG TOTAL) BY MOUTH DAILY. *NEW PRESCRIPTION REQUEST* 3/29/25   MABEL Hallman   pregabalin (LYRICA) 25 mg capsule Take 1 capsule (25 mg total) by mouth 2 (two) times a day 2/10/25   MABEL Oliveira   sertraline (ZOLOFT) 100 mg tablet TAKE 1 TABLET BY MOUTH EVERY DAY *NEW PRESCRIPTION REQUEST* 3/29/25   MABEL Hallman   simvastatin (ZOCOR) 40 mg tablet TAKE 1 TABLET BY MOUTH EVERYDAY AT BEDTIME *NEW PRESCRIPTION REQUEST* 3/29/25   MABEL Hallman   warfarin (COUMADIN) 2.5 mg tablet Take 1 tablet (2.5mg) Mon-Sat. Take 2 tablets (5mg) on Sun or as directed 4/4/25   Peace Borden PA-C   cyclobenzaprine (FLEXERIL) 5 mg tablet Take 1 tablet (5 mg total) by mouth 2 (two) times a day as needed for muscle spasms 8/30/22 9/5/22  MBAEL Hallman   meloxicam (Mobic) 15 mg tablet Take 1 tablet (15 mg total) by mouth daily 8/30/22 9/5/22  MABEL Hallman     Allergies   Allergen Reactions    Latex Rash    Neosporin [Neomycin-Bacitracin Zn-Polymyx] Rash and Other (See Comments)     hives per [facility] order       Objective :  Temp:  [97.3 °F (36.3 °C)-98 °F (36.7 °C)] 97.3 °F (36.3 °C)  HR:  [60] 60  BP: (108-111)/(51-55) 111/55  Resp:  [18-20] 18  SpO2:  [97 %-100 %] 97 %  O2 Device: Nasal cannula  Nasal Cannula O2 Flow Rate (L/min):  [4 L/min] 4 L/min    Physical Exam   - GEN: Appears well, alert and oriented x 3, pleasant and cooperative, in no acute distress  - HEENT: Anicteric, mucous membranes moist, PERRL and EOMI   - NECK: No lymphadenopathy, JVD or carotid bruits   - HEART: RRR, normal S1 and S2, no murmurs, clicks, gallops or rubs   - LUNGS: Clear to auscultation bilaterally; no wheezes, rales, or rhonchi  - ABDOMEN: Normal bowel sounds, soft, no tenderness, no distention, no organomegaly or masses felt on exam.   - EXTREMITIES: Peripheral pulses normal; no clubbing, cyanosis, or edema  - NEURO: No focal findings, CN II-XII  are grossly intact.   - Musculoskeletal: 5/5 strength, normal ROM, no swollen or erythematous joints.   - SKIN: Normal without suspicious lesions on exposed skin      Lines/Drains:            Lab Results: I have reviewed the following results:  Results from last 7 days   Lab Units 04/10/25  1507   WBC Thousand/uL 11.11*   HEMOGLOBIN g/dL 9.9*   HEMATOCRIT % 32.4*   PLATELETS Thousands/uL 439*   SEGS PCT % 71   LYMPHO PCT % 16   MONO PCT % 7   EOS PCT % 4     Results from last 7 days   Lab Units 04/10/25  1507   SODIUM mmol/L 139   POTASSIUM mmol/L 3.3*   CHLORIDE mmol/L 105   CO2 mmol/L 28   BUN mg/dL 17   CREATININE mg/dL 0.68   ANION GAP mmol/L 6   CALCIUM mg/dL 9.7   ALBUMIN g/dL 3.2*   TOTAL BILIRUBIN mg/dL 0.25   ALK PHOS U/L 96   ALT U/L 16   AST U/L 24   GLUCOSE RANDOM mg/dL 104     Results from last 7 days   Lab Units 04/10/25  1507   INR  3.71*         Lab Results   Component Value Date    HGBA1C 6.1 (H) 08/18/2023    HGBA1C 5.7 (H) 07/11/2023    HGBA1C 5.9 (H) 03/17/2023           Imaging Results Review: I reviewed radiology reports from this admission including: CT chest.  Other Study Results Review: EKG was reviewed.     Administrative Statements   I have spent a total time of 50 minutes in caring for this patient on the day of the visit/encounter including Diagnostic results, Prognosis, Risks and benefits of tx options, Instructions for management, Patient and family education, Importance of tx compliance, Risk factor reductions, Impressions, Counseling / Coordination of care, Documenting in the medical record, Reviewing/placing orders in the medical record (including tests, medications, and/or procedures), and Obtaining or reviewing history  .    ** Please Note: This note has been constructed using a voice recognition system. **

## 2025-04-10 NOTE — ASSESSMENT & PLAN NOTE
Latest Reference Range & Units 04/10/25 15:07   TSH 3RD GENERATON 0.450 - 4.500 uIU/mL 1.496     Continue Levothyroxine 50 mcg QD

## 2025-04-10 NOTE — ED PROVIDER NOTES
Time reflects when diagnosis was documented in both MDM as applicable and the Disposition within this note       Time User Action Codes Description Comment    4/10/2025  7:09 PM Margaret Mccartney Add [R00.2] Palpitations     4/10/2025  7:09 PM Margaret Mccartney Add [I47.19] Paroxysmal junctional tachycardia (HCC)     4/10/2025  7:09 PM Margaret Mccartney Add [E87.6] Hypokalemia     4/10/2025  7:09 PM Margaret Mccartney Add [E83.42] Hypomagnesemia           ED Disposition       ED Disposition   Admit    Condition   Stable    Date/Time   Thu Apr 10, 2025  7:10 PM    Comment   Case was discussed with MONSTER and the patient's admission status was agreed to be Admission Status: observation status to the service of Dr. Woodward.               Assessment & Plan       Medical Decision Making  84-year-old female with history of pacemaker placement, aortic valve replacement on Coumadin, congestive heart failure, chronic O2 dependent respiratory failure secondary to COPD and pulmonary hypertension, presents to the ED for an episode of palpitations and tachycardia in which her heart rate was in the 140s.  Prehospital it did convert back to a normal paced rhythm with heart rate of 68 which is consistent with ED EKG on arrival.  Patient currently asymptomatic.  According to patient as well as medical records, she has had similar episodes in the month of March and was started on metoprolol by her cardiologist on 3/28.  She states since starting the medication she is also having abdominal pain and diarrhea.  Will interrogate pacemaker through [employer].  Will check cardiac labs, electrolytes.  Will likely pursue CT imaging given the abdominal pain and diarrhea.  Will touch base with on-call cardiologist for disposition recommendations.    Amount and/or Complexity of Data Reviewed  Labs: ordered. Decision-making details documented in ED Course.  Radiology: ordered and independent interpretation performed. Decision-making details documented in  ED Course.  ECG/medicine tests: ordered and independent interpretation performed. Decision-making details documented in ED Course.    Risk  Prescription drug management.  Decision regarding hospitalization.        ED Course as of 04/10/25 1922   Thu Apr 10, 2025   1505 Messaged cardiologist on-call, Dr. Esteves as well as cardiology AP, Fercho vyas, for recommendations pending her workup in regards to whether or not patient should be admitted for observation or if she needs transfer to [facility] for EP consult.   1520 Dr. Esteves recommended increasing her metoprolol to 50 mg daily if her blood pressure allows for this and if her workup is normal and she remains in paced rhythm, she can be discharged.   1548 Potassium(!): 3.3   1548 MAGNESIUM(!): 1.4  Will replace with PO potassium and IV magnesium.    1549 BNP(!): 201  Similar to baseline BNP/prior labs.   1600 Pending CT scan, will consider admission given the low blood pressure and hesitancy to increase her metoprolol as well as for monitoring of electrolytes.   1612 POCT INR(!): 3.71   1711 Updated patient about workup thus far and recommendation for observation admission for both cardiac monitoring and electrolyte replacement.  Patient agreeable.   1731 Spoke with internal medicine and they recommended we replace the magnesium as well as potassium, recheck troponin and EKG and if stable, she can be discharged with the increased dose of metoprolol.  They do not feel she requires admission at this time.  Will reassess after electrolyte replacement and repeat troponin/EKG.   1851 hs TnI 2hr: 27  Updated SLIM about repeat troponin and EKG.    1908 Updated patient about troponin being more elevated as well as repeat EKG showing possible junctional rhythm.  I did recommend admission at this time.  Patient agreeable.  Updated internal medicine and they are agreeable to observation.       Medications   magnesium sulfate 4 g/100 mL IVPB (premix) 4 g (4 g Intravenous New Bag  4/10/25 1738)   potassium chloride 20 mEq IVPB (premix) (20 mEq Intravenous New Bag 4/10/25 1743)   potassium chloride (Klor-Con M20) CR tablet 40 mEq (40 mEq Oral Given 4/10/25 1748)   iohexol (OMNIPAQUE) 350 MG/ML injection (MULTI-DOSE) 85 mL (85 mL Intravenous Given 4/10/25 1635)       ED Risk Strat Scores                    (ISAR) Identification of Seniors at Risk  Before the illness or injury that brought you to the Emergency, did you need someone to help you on a regular basis?: 1  In the last 24 hours, have you needed more help than usual?: 0  Have you been hospitalized for one or more nights during the past 6 months?: 1  In general, do you see well?: 0  In general, do you have serious problems with your memory?: 0  Do you take more than three different medications every day?: 1  ISAR Score: 3            SBIRT 22yo+      Flowsheet Row Most Recent Value   Initial Alcohol Screen: US AUDIT-C     1. How often do you have a drink containing alcohol? 0 Filed at: 04/10/2025 1528   2. How many drinks containing alcohol do you have on a typical day you are drinking?  0 Filed at: 04/10/2025 1528   3b. FEMALE Any Age, or MALE 65+: How often do you have 4 or more drinks on one occassion? 0 Filed at: 04/10/2025 1528   Audit-C Score 0 Filed at: 04/10/2025 1528   SHAWN: How many times in the past year have you...    Used an illegal drug or used a prescription medication for non-medical reasons? Never Filed at: 04/10/2025 1528                            History of Present Illness       Chief Complaint   Patient presents with    Palpitations     Started feeling palpitations while doing laundry at home, EMS EKG initially showed a rate in the 140's, patient has a pacemaker, repeat EKG by ems showed a HR paced in the 60's, denies chest pain or sob but 4L home dependent        Past Medical History:   Diagnosis Date    Anemia 08/22/2018    Anxiety     Arthritis     AVB (atrioventricular block)     first degree    Cataract     CHF  (congestive heart failure) (HCC)     COPD, mild (HCC)     Coronary artery disease     Dislocation of right shoulder joint     Frequent UTI     GERD (gastroesophageal reflux disease)     H/O: pneumonia     Heme positive stool     Hyperlipidemia     Hypertension     Hypothyroidism     Morbid obesity with BMI of 50.0-59.9, adult (HCC)     Obesity, morbid (HCC) 08/22/2018    JANICE on CPAP     Pulmonary hypertension (HCC) 08/22/2018    Severe aortic stenosis     Simple goiter     Skin cyst     within the armpits, right    Wears glasses       Past Surgical History:   Procedure Laterality Date    BREAST BIOPSY      CARDIAC CATHETERIZATION      CARDIAC ELECTROPHYSIOLOGY PROCEDURE N/A 8/12/2024    Procedure: Cardiac pacer implant;  Surgeon: Clarke Zuluaga MD;  Location: [facility];  Service: Cardiology    CARPAL TUNNEL RELEASE Bilateral     CHOLECYSTECTOMY      DILATION AND CURETTAGE OF UTERUS      HYSTEROSCOPY      MASTOID SURGERY      AR COLONOSCOPY FLX DX W/COLLJ SPEC WHEN PFRMD N/A 9/6/2018    Procedure: COLONOSCOPY;  Surgeon: Shree Sosa III, MD;  Location: [facility];  Service: Gastroenterology    AR ECHO TRANSESOPHAG R-T 2D W/PRB IMG ACQUISJ I&R N/A 10/9/2018    Procedure: INTRA-OP TRANSESOPHAGEAL ECHOCARDIOGRAM (GARRISON);  Surgeon: Kushal Camarena DO;  Location: [facility];  Service: Cardiac Surgery    AR ESOPHAGOGASTRODUODENOSCOPY TRANSORAL DIAGNOSTIC N/A 8/31/2018    Procedure: ESOPHAGOGASTRODUODENOSCOPY (EGD);  Surgeon: Shree Sosa III, MD;  Location: [facility];  Service: Gastroenterology    AR REPLACE AORTIC VALVE OPENFEMORAL ARTERY APPROACH N/A 10/9/2018    Procedure: REPLACEMENT AORTIC VALVE TRANSCATHETER (TAVR) TRANSFEMORAL W/ 23 MM MENDOZA NOE S3 VALVE (ACCESS OF LEFT);  Surgeon: Kushal Camarena DO;  Location: [facility];  Service: Cardiac Surgery    TOTAL HIP ARTHROPLASTY Left 2007    TOTAL KNEE ARTHROPLASTY Bilateral       Family History   Problem Relation Age of Onset    Diabetes Mother      Stroke Mother     Cancer Father     Lung cancer Father     Diabetes Sister     Heart disease Sister     Hypertension Sister     Coronary artery disease Family     Diabetes Family     Hypertension Family     Cancer Family     Stroke Family     Thyroid disease Neg Hx       Social History     Tobacco Use    Smoking status: Never     Passive exposure: Never    Smokeless tobacco: Never   Vaping Use    Vaping status: Never Used   Substance Use Topics    Alcohol use: Not Currently    Drug use: Never      E-Cigarette/Vaping    E-Cigarette Use Never User       E-Cigarette/Vaping Substances    Nicotine No     THC No     CBD No     Flavoring No     Other No     Unknown No       I have reviewed and agree with the history as documented.     Patient is an 84-year-old female with past medical history of COPD, pulmonary hypertension, chronic respiratory failure for which she is on 4 L nasal cannula oxygen at home, congestive heart failure, chronic anemia, hypertension, hyperlipidemia, hypothyroidism, aortic stenosis status post TAVR and currently on Coumadin, GERD, coronary artery disease, status post pacemaker placement, presents to the emergency department by ambulance for palpitations and elevated heart rate.  According to patient, she was at home and started feeling her heart racing.  She states she got very anxious and did feel short of breath as well so she called 911.  Prehospital, rhythm strip did show tachycardia consistent with accelerated junctional rhythm and heart rate in the 140s.  They were also able to capture rhythm strip in which she initially was tachycardic and then converted back to a normal paced sinus rhythm.  Patient states that the palpitations have resolved.  She denies any current dyspnea, chest pain, diaphoresis, recent fevers or chills, headache, dizziness or near syncope, cough or URI symptoms, hemoptysis, nausea, vomiting, dysuria, hematuria, flank pain, skin rash or color change, leg pain or  swelling, focal neurologic deficits.  Patient does report increased urinary frequency.  She states that this does happen several times before in which her heart became tachycardic despite having the pacemaker and her cardiologist started her on metoprolol for this.  She states since starting this medication she has had some stomach pain as well as diarrhea.  Denies any blood per rectum or melena.  According to medical records, it appears she started the metoprolol on 3/28.      History provided by:  Patient and EMS personnel   used: No    Palpitations  Associated symptoms: no back pain, no chest pain, no cough, no diaphoresis, no dizziness, no nausea, no numbness, no shortness of breath, no vomiting and no weakness        Review of Systems   Constitutional:  Negative for chills, diaphoresis and fever.   HENT:  Negative for congestion, ear pain, rhinorrhea and sore throat.    Eyes:  Negative for pain and visual disturbance.   Respiratory:  Negative for cough, chest tightness, shortness of breath and wheezing.    Cardiovascular:  Positive for palpitations. Negative for chest pain and leg swelling.   Gastrointestinal:  Negative for abdominal pain, constipation, diarrhea, nausea and vomiting.   Genitourinary:  Negative for dysuria, flank pain, frequency and hematuria.   Musculoskeletal:  Negative for back pain and neck pain.   Skin:  Negative for color change, pallor, rash and wound.   Allergic/Immunologic: Negative for immunocompromised state.   Neurological:  Negative for dizziness, syncope, weakness, light-headedness, numbness and headaches.   Hematological:  Negative for adenopathy. Bruises/bleeds easily.   Psychiatric/Behavioral:  Negative for confusion and decreased concentration.    All other systems reviewed and are negative.          Objective       ED Triage Vitals   Temperature Pulse Blood Pressure Respirations SpO2 Patient Position - Orthostatic VS   04/10/25 1436 04/10/25 1436 04/10/25  1436 04/10/25 1436 04/10/25 1436 04/10/25 1531   98 °F (36.7 °C) 60 108/51 20 100 % Sitting      Temp Source Heart Rate Source BP Location FiO2 (%) Pain Score    04/10/25 1436 04/10/25 1436 -- -- 04/10/25 1436    Oral Monitor   No Pain      Vitals      Date and Time Temp Pulse SpO2 Resp BP Pain Score FACES Pain Rating User   04/10/25 1531 98 °F (36.7 °C) 60 -- 18 108/51 No Pain -- MB   04/10/25 1436 98 °F (36.7 °C) 60 100 % 20 108/51 No Pain -- NEENA          Vitals:    04/10/25 1436 04/10/25 1531   BP: 108/51 108/51   Pulse: 60 60   Resp: 20 18   Temp: 98 °F (36.7 °C) 98 °F (36.7 °C)   TempSrc: Oral Oral   SpO2: 100%         Physical Exam  Vitals and nursing note reviewed.   Constitutional:       General: She is not in acute distress.     Appearance: Normal appearance. She is well-developed. She is not ill-appearing, toxic-appearing or diaphoretic.   HENT:      Head: Normocephalic and atraumatic.      Right Ear: External ear normal.      Left Ear: External ear normal.      Mouth/Throat:      Mouth: Mucous membranes are moist.      Pharynx: Oropharynx is clear.   Eyes:      Extraocular Movements: Extraocular movements intact.      Conjunctiva/sclera: Conjunctivae normal.   Neck:      Vascular: No JVD.   Cardiovascular:      Rate and Rhythm: Normal rate and regular rhythm.      Pulses: Normal pulses.      Heart sounds: Normal heart sounds. No murmur heard.     No friction rub. No gallop.   Pulmonary:      Effort: Pulmonary effort is normal. No respiratory distress.      Breath sounds: Normal breath sounds. No wheezing, rhonchi or rales.   Abdominal:      General: There is no distension.      Palpations: Abdomen is soft.      Tenderness: There is no abdominal tenderness. There is no guarding or rebound.   Musculoskeletal:         General: No swelling or tenderness. Normal range of motion.      Cervical back: Normal range of motion and neck supple. No rigidity.      Right lower leg: No edema.      Left lower leg: No  edema.   Skin:     General: Skin is warm and dry.      Coloration: Skin is not pale.      Findings: No erythema or rash.   Neurological:      General: No focal deficit present.      Mental Status: She is alert and oriented to person, place, and time.      Sensory: No sensory deficit.      Motor: No weakness.   Psychiatric:         Mood and Affect: Mood normal.         Behavior: Behavior normal.         Results Reviewed       Procedure Component Value Units Date/Time    HS Troponin I 2hr [[id number]]  (Normal) Collected: 04/10/25 1816    Lab Status: Final result Specimen: Blood from Arm, Left Updated: 04/10/25 1847     hs TnI 2hr 27 ng/L      Delta 2hr hsTnI 14 ng/L     TSH, 3rd generation with Free T4 reflex [[id number]]  (Normal) Collected: 04/10/25 1507    Lab Status: Final result Specimen: Blood from Arm, Left Updated: 04/10/25 1648     TSH 3RD GENERATON 1.496 uIU/mL     HS Troponin I 4hr [[id number]]     Lab Status: No result Specimen: Blood     Protime-INR [[id number]]  (Abnormal) Collected: 04/10/25 1507    Lab Status: Final result Specimen: Blood from Arm, Left Updated: 04/10/25 1603     Protime 37.2 seconds      INR 3.71    Narrative:      INR Therapeutic Range    Indication                                             INR Range      Atrial Fibrillation                                               2.0-3.0  Hypercoagulable State                                    2.0.2.3  Left Ventricular Asist Device                            2.0-3.0  Mechanical Heart Valve                                  -    Aortic(with afib, MI, embolism, HF, LA enlargement,    and/or coagulopathy)                                     2.0-3.0 (2.5-3.5)     Mitral                                                             2.5-3.5  Prosthetic/Bioprosthetic Heart Valve               2.0-3.0  Venous thromboembolism (VTE: VT, PE        2.0-3.0    APTT [[id number]]  (Abnormal) Collected: 04/10/25 1507    Lab Status: Final result Specimen: Blood  from Arm, Left Updated: 04/10/25 1603     PTT 89 seconds     Urine Microscopic [[id number]]  (Abnormal) Collected: 04/10/25 1526    Lab Status: Final result Specimen: Urine, Clean Catch Updated: 04/10/25 1545     RBC, UA 30-50 /hpf      WBC, UA 4-10 /hpf      Epithelial Cells Occasional /hpf      Bacteria, UA Occasional /hpf     UA w Reflex to Microscopic w Reflex to Culture [[id number]]  (Abnormal) Collected: 04/10/25 1526    Lab Status: Final result Specimen: Urine, Clean Catch Updated: 04/10/25 1543     Color, UA Light Yellow     Clarity, UA Clear     Specific Gravity, UA 1.007     pH, UA 5.5     Leukocytes, UA Negative     Nitrite, UA Negative     Protein, UA Trace mg/dl      Glucose, UA Negative mg/dl      Ketones, UA Negative mg/dl      Urobilinogen, UA <2.0 mg/dl      Bilirubin, UA Negative     Occult Blood, UA Moderate    HS Troponin 0hr (reflex protocol) [[id number]]  (Normal) Collected: 04/10/25 1507    Lab Status: Final result Specimen: Blood from Arm, Left Updated: 04/10/25 1543     hs TnI 0hr 13 ng/L     B-Type Natriuretic Peptide(BNP) [[id number]]  (Abnormal) Collected: 04/10/25 1507    Lab Status: Final result Specimen: Blood from Arm, Left Updated: 04/10/25 1541      pg/mL     Hepatic function panel [[id number]]  (Abnormal) Collected: 04/10/25 1507    Lab Status: Final result Specimen: Blood from Arm, Left Updated: 04/10/25 1535     Total Bilirubin 0.25 mg/dL      Bilirubin, Direct 0.04 mg/dL      Alkaline Phosphatase 96 U/L      AST 24 U/L      ALT 16 U/L      Total Protein 6.5 g/dL      Albumin 3.2 g/dL     Basic metabolic panel [[id number]]  (Abnormal) Collected: 04/10/25 1507    Lab Status: Final result Specimen: Blood from Arm, Left Updated: 04/10/25 1535     Sodium 139 mmol/L      Potassium 3.3 mmol/L      Chloride 105 mmol/L      CO2 28 mmol/L      ANION GAP 6 mmol/L      BUN 17 mg/dL      Creatinine 0.68 mg/dL      Glucose 104 mg/dL      Calcium 9.7 mg/dL      eGFR 80 ml/min/1.73sq m      Narrative:      [employer] Foundation guidelines for Chronic Kidney Disease (CKD):     Stage 1 with normal or high GFR (GFR > 90 mL/min/1.73 square meters)    Stage 2 Mild CKD (GFR = 60-89 mL/min/1.73 square meters)    Stage 3A Moderate CKD (GFR = 45-59 mL/min/1.73 square meters)    Stage 3B Moderate CKD (GFR = 30-44 mL/min/1.73 square meters)    Stage 4 Severe CKD (GFR = 15-29 mL/min/1.73 square meters)    Stage 5 End Stage CKD (GFR <15 mL/min/1.73 square meters)  Note: GFR calculation is accurate only with a steady state creatinine    Magnesium [[id number]]  (Abnormal) Collected: 04/10/25 1507    Lab Status: Final result Specimen: Blood from Arm, Left Updated: 04/10/25 1535     Magnesium 1.4 mg/dL     CBC and differential [[id number]]  (Abnormal) Collected: 04/10/25 1507    Lab Status: Final result Specimen: Blood from Arm, Left Updated: 04/10/25 1515     WBC 11.11 Thousand/uL      RBC 3.81 Million/uL      Hemoglobin 9.9 g/dL      Hematocrit 32.4 %      MCV 85 fL      MCH 26.0 pg      MCHC 30.6 g/dL      RDW 20.1 %      MPV 9.0 fL      Platelets 439 Thousands/uL      nRBC 0 /100 WBCs      Segmented % 71 %      Immature Grans % 1 %      Lymphocytes % 16 %      Monocytes % 7 %      Eosinophils Relative 4 %      Basophils Relative 1 %      Absolute Neutrophils 7.88 Thousands/µL      Absolute Immature Grans 0.09 Thousand/uL      Absolute Lymphocytes 1.81 Thousands/µL      Absolute Monocytes 0.79 Thousand/µL      Eosinophils Absolute 0.48 Thousand/µL      Basophils Absolute 0.06 Thousands/µL             CT pe study w abdomen pelvis w contrast   Final Interpretation by Luciano Hunter MD (04/10 1700)      No acute findings in the chest, abdomen or pelvis.                     Workstation performed: [id number]         XR chest 1 view portable   ED Interpretation by Margaret Mccartney DO (04/10 1521)   Stable cardiomegaly.  No acute abnormality in the chest.      Final Interpretation by Joy  MD Jeanette (04/10 0142)      No acute cardiopulmonary disease.            Workstation performed: [id number]                     ECG 12 Lead Documentation Only    Date/Time: 4/10/2025 2:47 PM    Performed by: Margaret Mccartney DO  Authorized by: Margaret Mccartney DO    ECG reviewed by me, the ED Provider: yes    Patient location:  ED  Rate:     ECG rate:  60    ECG rate assessment: normal    Rhythm:     Rhythm: sinus rhythm and paced    Pacing:     Capture:  Complete    Type of pacing:  Atrial  Ectopy:     Ectopy: none    QRS:     QRS axis:  Normal    QRS intervals:  Normal  Conduction:     Conduction: normal    ST segments:     ST segments:  Normal  T waves:     T waves: normal    ECG 12 Lead Documentation Only    Date/Time: 4/10/2025 6:43 PM    Performed by: Margaret Mccartney DO  Authorized by: Margaret Mccartney DO    ECG reviewed by me, the ED Provider: yes    Patient location:  ED  Rate:     ECG rate:  60    ECG rate assessment: normal    Rhythm:     Rhythm: junctional and paced    Pacing:     Capture:  Complete    Type of pacing:  Atrial  Ectopy:     Ectopy: none    QRS:     QRS axis:  Normal    QRS intervals:  Normal  Conduction:     Conduction: abnormal      Abnormal conduction: 1st degree    ST segments:     ST segments:  Normal  T waves:     T waves: normal        ED Medication and Procedure Management   Prior to Admission Medications   Prescriptions Last Dose Informant Patient Reported? Taking?   Blood Glucose Monitoring Suppl (OneTouch Verio Reflect) w/Device KIT  Self No No   Sig: Check blood sugars once daily. Please substitute with appropriate alternative as covered by patient's insurance. Dx: E11.65   Calcium Carb-Cholecalciferol (CALCIUM 600 + D PO)  Self Yes No   Sig: Take 1 tablet by mouth 2 (two) times a day   Cranberry 250 MG TABS  Self Yes No   Sig: Take by mouth   Lancets (onetouch ultrasoft) lancets  Self No No   Sig: Use in the morning Use as instructed   OneTouch  Delica Lancets 33G MISC  Self No No   Sig: Check blood sugars once daily. Please substitute with appropriate alternative as covered by patient's insurance. Dx: E11.65   acetaminophen (TYLENOL) 500 mg tablet  Self Yes No   Sig: Take 500 mg by mouth every 6 (six) hours as needed   aspirin (Aspirin Low Dose) 81 mg chewable tablet   No No   Sig: CHEW AND SWALLOW 1 TABLET(S) BY MOUTH 1 TIMES PER DAY *NEW PRESCRIPTION REQUEST*   aspirin (ECOTRIN LOW STRENGTH) 81 mg EC tablet  Self No No   Sig: Take 1 tablet (81 mg total) by mouth daily   b complex vitamins capsule  Self Yes No   Sig: Take 1 capsule by mouth 2 (two) times a day     benzonatate (TESSALON PERLES) 100 mg capsule   No No   Sig: Take 1 capsule (100 mg total) by mouth 3 (three) times a day as needed for cough   fluticasone (FLONASE) 50 mcg/act nasal spray  Self No No   Si spray into each nostril daily   furosemide (LASIX) 40 mg tablet   No No   Sig: TAKE 1 TABLET BY MOUTH EVERY DAY *NEW PRESCRIPTION REQUEST*   glucose blood (OneTouch Verio) test strip  Self No No   Sig: Check blood sugars once daily. Please substitute with appropriate alternative as covered by patient's insurance. Dx: E11.65   levothyroxine 50 mcg tablet   No No   Sig: TAKE 1 TABLET BY MOUTH EVERY DAY *NEW PRESCRIPTION REQUEST*   metoprolol succinate (TOPROL-XL) 25 mg 24 hr tablet   No No   Sig: TAKE 1 TABLET(S) BY MOUTH 1 TIMES PER DAY *NEW PRESCRIPTION REQUEST*   nystatin (MYCOSTATIN) powder   No No   Sig: Apply topically 2 (two) times a day   omeprazole (PriLOSEC) 40 MG capsule   No No   Sig: TAKE 1 CAPSULE BY MOUTH TWICE A DAY *NEW PRESCRIPTION REQUEST*   oxybutynin (DITROPAN-XL) 5 mg 24 hr tablet   No No   Sig: TAKE 1 TABLET (5 MG TOTAL) BY MOUTH DAILY. *NEW PRESCRIPTION REQUEST*   pregabalin (LYRICA) 25 mg capsule   No No   Sig: Take 1 capsule (25 mg total) by mouth 2 (two) times a day   sertraline (ZOLOFT) 100 mg tablet   No No   Sig: TAKE 1 TABLET BY MOUTH EVERY DAY *NEW PRESCRIPTION  REQUEST*   simvastatin (ZOCOR) 40 mg tablet   No No   Sig: TAKE 1 TABLET BY MOUTH EVERYDAY AT BEDTIME *NEW PRESCRIPTION REQUEST*   warfarin (COUMADIN) 2.5 mg tablet   No No   Sig: Take 1 tablet (2.5mg) Mon-Sat. Take 2 tablets (5mg) on Sun or as directed      Facility-Administered Medications: None     Patient's Medications   Discharge Prescriptions    No medications on file     No discharge procedures on file.  ED SEPSIS DOCUMENTATION   Time reflects when diagnosis was documented in both MDM as applicable and the Disposition within this note       Time User Action Codes Description Comment    4/10/2025  7:09 PM Margaret Mccartney [R00.2] Palpitations     4/10/2025  7:09 PM Margaret Mccartney [I47.19] Paroxysmal junctional tachycardia (HCC)     4/10/2025  7:09 PM Margaret Mccartney [E87.6] Hypokalemia     4/10/2025  7:09 PM Margaret Mccartney [E83.42] Hypomagnesemia                  Margaret Mccartney DO  04/10/25 1922

## 2025-04-11 ENCOUNTER — TELEPHONE (OUTPATIENT)
Dept: CARDIOLOGY CLINIC | Facility: CLINIC | Age: 85
End: 2025-04-11

## 2025-04-11 DIAGNOSIS — I48.0 PAROXYSMAL ATRIAL FIBRILLATION (HCC): Primary | ICD-10-CM

## 2025-04-11 PROBLEM — I47.29 NSVT (NONSUSTAINED VENTRICULAR TACHYCARDIA) (HCC): Status: ACTIVE | Noted: 2025-04-11

## 2025-04-11 PROBLEM — I47.10 SVT (SUPRAVENTRICULAR TACHYCARDIA) (HCC): Status: ACTIVE | Noted: 2025-04-11

## 2025-04-11 PROBLEM — R06.00 DYSPNEA: Status: ACTIVE | Noted: 2020-06-16

## 2025-04-11 LAB
ALBUMIN SERPL BCG-MCNC: 2.9 G/DL (ref 3.5–5)
ALP SERPL-CCNC: 97 U/L (ref 34–104)
ALT SERPL W P-5'-P-CCNC: 15 U/L (ref 7–52)
ANION GAP SERPL CALCULATED.3IONS-SCNC: 6 MMOL/L (ref 4–13)
AST SERPL W P-5'-P-CCNC: 20 U/L (ref 13–39)
ATRIAL RATE: 29 BPM
ATRIAL RATE: 60 BPM
ATRIAL RATE: 60 BPM
ATRIAL RATE: 63 BPM
BASOPHILS # BLD AUTO: 0.04 THOUSANDS/ÂΜL (ref 0–0.1)
BASOPHILS NFR BLD AUTO: 0 % (ref 0–1)
BILIRUB SERPL-MCNC: 0.22 MG/DL (ref 0.2–1)
BUN SERPL-MCNC: 18 MG/DL (ref 5–25)
CALCIUM ALBUM COR SERPL-MCNC: 10.2 MG/DL (ref 8.3–10.1)
CALCIUM SERPL-MCNC: 9.3 MG/DL (ref 8.4–10.2)
CHLORIDE SERPL-SCNC: 106 MMOL/L (ref 96–108)
CO2 SERPL-SCNC: 27 MMOL/L (ref 21–32)
CREAT SERPL-MCNC: 0.6 MG/DL (ref 0.6–1.3)
EOSINOPHIL # BLD AUTO: 0.58 THOUSAND/ÂΜL (ref 0–0.61)
EOSINOPHIL NFR BLD AUTO: 5 % (ref 0–6)
ERYTHROCYTE [DISTWIDTH] IN BLOOD BY AUTOMATED COUNT: 20.2 % (ref 11.6–15.1)
GFR SERPL CREATININE-BSD FRML MDRD: 83 ML/MIN/1.73SQ M
GLUCOSE P FAST SERPL-MCNC: 99 MG/DL (ref 65–99)
GLUCOSE SERPL-MCNC: 99 MG/DL (ref 65–140)
HCT VFR BLD AUTO: 31.9 % (ref 34.8–46.1)
HGB BLD-MCNC: 9.6 G/DL (ref 11.5–15.4)
IMM GRANULOCYTES # BLD AUTO: 0.08 THOUSAND/UL (ref 0–0.2)
IMM GRANULOCYTES NFR BLD AUTO: 1 % (ref 0–2)
INR PPP: 3.54 (ref 0.85–1.19)
LYMPHOCYTES # BLD AUTO: 1.55 THOUSANDS/ÂΜL (ref 0.6–4.47)
LYMPHOCYTES NFR BLD AUTO: 14 % (ref 14–44)
MAGNESIUM SERPL-MCNC: 2.1 MG/DL (ref 1.9–2.7)
MCH RBC QN AUTO: 26.2 PG (ref 26.8–34.3)
MCHC RBC AUTO-ENTMCNC: 30.1 G/DL (ref 31.4–37.4)
MCV RBC AUTO: 87 FL (ref 82–98)
MONOCYTES # BLD AUTO: 0.97 THOUSAND/ÂΜL (ref 0.17–1.22)
MONOCYTES NFR BLD AUTO: 8 % (ref 4–12)
NEUTROPHILS # BLD AUTO: 8.28 THOUSANDS/ÂΜL (ref 1.85–7.62)
NEUTS SEG NFR BLD AUTO: 72 % (ref 43–75)
NRBC BLD AUTO-RTO: 0 /100 WBCS
P AXIS: -15 DEGREES
P AXIS: 38 DEGREES
P AXIS: 8 DEGREES
PLATELET # BLD AUTO: 423 THOUSANDS/UL (ref 149–390)
PMV BLD AUTO: 9.3 FL (ref 8.9–12.7)
POTASSIUM SERPL-SCNC: 4 MMOL/L (ref 3.5–5.3)
PR INTERVAL: 184 MS
PR INTERVAL: 200 MS
PR INTERVAL: 238 MS
PROT SERPL-MCNC: 6 G/DL (ref 6.4–8.4)
PROTHROMBIN TIME: 36 SECONDS (ref 12.3–15)
QRS AXIS: -12 DEGREES
QRS AXIS: -15 DEGREES
QRS AXIS: -25 DEGREES
QRS AXIS: -47 DEGREES
QRSD INTERVAL: 76 MS
QRSD INTERVAL: 78 MS
QRSD INTERVAL: 80 MS
QRSD INTERVAL: 82 MS
QT INTERVAL: 410 MS
QT INTERVAL: 420 MS
QT INTERVAL: 444 MS
QT INTERVAL: 450 MS
QTC INTERVAL: 419 MS
QTC INTERVAL: 420 MS
QTC INTERVAL: 444 MS
QTC INTERVAL: 450 MS
RBC # BLD AUTO: 3.67 MILLION/UL (ref 3.81–5.12)
SODIUM SERPL-SCNC: 139 MMOL/L (ref 135–147)
T WAVE AXIS: 0 DEGREES
T WAVE AXIS: 0 DEGREES
T WAVE AXIS: 6 DEGREES
T WAVE AXIS: 7 DEGREES
VENTRICULAR RATE: 60 BPM
VENTRICULAR RATE: 63 BPM
WBC # BLD AUTO: 11.5 THOUSAND/UL (ref 4.31–10.16)

## 2025-04-11 PROCEDURE — 99222 1ST HOSP IP/OBS MODERATE 55: CPT | Performed by: INTERNAL MEDICINE

## 2025-04-11 PROCEDURE — 85025 COMPLETE CBC W/AUTO DIFF WBC: CPT | Performed by: STUDENT IN AN ORGANIZED HEALTH CARE EDUCATION/TRAINING PROGRAM

## 2025-04-11 PROCEDURE — 83735 ASSAY OF MAGNESIUM: CPT | Performed by: STUDENT IN AN ORGANIZED HEALTH CARE EDUCATION/TRAINING PROGRAM

## 2025-04-11 PROCEDURE — 93010 ELECTROCARDIOGRAM REPORT: CPT | Performed by: INTERNAL MEDICINE

## 2025-04-11 PROCEDURE — 85610 PROTHROMBIN TIME: CPT | Performed by: STUDENT IN AN ORGANIZED HEALTH CARE EDUCATION/TRAINING PROGRAM

## 2025-04-11 PROCEDURE — 99232 SBSQ HOSP IP/OBS MODERATE 35: CPT | Performed by: STUDENT IN AN ORGANIZED HEALTH CARE EDUCATION/TRAINING PROGRAM

## 2025-04-11 PROCEDURE — 80053 COMPREHEN METABOLIC PANEL: CPT | Performed by: STUDENT IN AN ORGANIZED HEALTH CARE EDUCATION/TRAINING PROGRAM

## 2025-04-11 RX ORDER — ATENOLOL 25 MG/1
25 TABLET ORAL DAILY
Status: DISCONTINUED | OUTPATIENT
Start: 2025-04-12 | End: 2025-04-12 | Stop reason: HOSPADM

## 2025-04-11 RX ORDER — ACETAMINOPHEN 325 MG/1
650 TABLET ORAL EVERY 6 HOURS PRN
Status: DISCONTINUED | OUTPATIENT
Start: 2025-04-11 | End: 2025-04-12 | Stop reason: HOSPADM

## 2025-04-11 RX ORDER — ATENOLOL 25 MG/1
25 TABLET ORAL DAILY
Qty: 30 TABLET | Refills: 5 | Status: SHIPPED | OUTPATIENT
Start: 2025-04-11 | End: 2025-04-12

## 2025-04-11 RX ORDER — ALBUTEROL SULFATE 90 UG/1
2 INHALANT RESPIRATORY (INHALATION) EVERY 4 HOURS PRN
Status: DISCONTINUED | OUTPATIENT
Start: 2025-04-11 | End: 2025-04-12 | Stop reason: HOSPADM

## 2025-04-11 RX ORDER — LIDOCAINE 50 MG/G
1 PATCH TOPICAL DAILY PRN
Status: DISCONTINUED | OUTPATIENT
Start: 2025-04-11 | End: 2025-04-12 | Stop reason: HOSPADM

## 2025-04-11 RX ORDER — LEVALBUTEROL INHALATION SOLUTION 0.63 MG/3ML
0.63 SOLUTION RESPIRATORY (INHALATION) ONCE
Status: DISCONTINUED | OUTPATIENT
Start: 2025-04-11 | End: 2025-04-12 | Stop reason: HOSPADM

## 2025-04-11 RX ADMIN — METOPROLOL SUCCINATE 25 MG: 25 TABLET, EXTENDED RELEASE ORAL at 09:01

## 2025-04-11 RX ADMIN — FUROSEMIDE 40 MG: 40 TABLET ORAL at 09:01

## 2025-04-11 RX ADMIN — PREGABALIN 25 MG: 25 CAPSULE ORAL at 09:01

## 2025-04-11 RX ADMIN — ASPIRIN 81 MG: 81 TABLET, COATED ORAL at 09:01

## 2025-04-11 RX ADMIN — PANTOPRAZOLE SODIUM 40 MG: 40 TABLET, DELAYED RELEASE ORAL at 06:53

## 2025-04-11 RX ADMIN — SERTRALINE HYDROCHLORIDE 100 MG: 100 TABLET ORAL at 09:01

## 2025-04-11 RX ADMIN — LEVOTHYROXINE SODIUM 50 MCG: 0.05 TABLET ORAL at 06:53

## 2025-04-11 RX ADMIN — NYSTATIN: 100000 POWDER TOPICAL at 06:52

## 2025-04-11 RX ADMIN — ACETAMINOPHEN 650 MG: 325 TABLET, FILM COATED ORAL at 19:55

## 2025-04-11 RX ADMIN — NYSTATIN: 100000 POWDER TOPICAL at 09:02

## 2025-04-11 RX ADMIN — PREGABALIN 25 MG: 25 CAPSULE ORAL at 17:01

## 2025-04-11 RX ADMIN — OXYBUTYNIN CHLORIDE 5 MG: 5 TABLET, EXTENDED RELEASE ORAL at 09:02

## 2025-04-11 RX ADMIN — PRAVASTATIN SODIUM 40 MG: 40 TABLET ORAL at 17:01

## 2025-04-11 RX ADMIN — ALBUTEROL SULFATE 2 PUFF: 90 AEROSOL, METERED RESPIRATORY (INHALATION) at 15:27

## 2025-04-11 RX ADMIN — ACETAMINOPHEN 650 MG: 325 TABLET, FILM COATED ORAL at 04:06

## 2025-04-11 NOTE — ASSESSMENT & PLAN NOTE
Wt Readings from Last 3 Encounters:   04/10/25 73.7 kg (162 lb 7.7 oz)   03/03/25 81.3 kg (179 lb 3.7 oz)   01/31/25 79.8 kg (175 lb 14.8 oz)   EF 65%  Appears euvolemic on exam  Diuretic: Continue Lasix 40 mg daily  Recommend low-sodium diet, daily weights, strict I's and O's.  Recommend continuing to monitor and replete electrolytes as needed.

## 2025-04-11 NOTE — PROGRESS NOTES
Progress Note - Hospitalist   Name: Marisol Otoole 84 y.o. female I MRN: [id number]  Unit/Bed#: 2 E 256-01 I Date of Admission: 4/10/2025   Date of Service: 4/11/2025 I Hospital Day: 0    Assessment & Plan  Heart palpitations  The patient was not taking her medication because she thought it caused diarrhea  Switch to atenolol per cardiology  No further symptoms here    Supratherapeutic INR   Latest Reference Range & Units 03/13/25 05:48 03/20/25 07:12 03/27/25 08:31 04/10/25 15:07   POCT INR 0.85 - 1.19  2.2 (E) 4.87 (H) 2.69 (H) 3.71 (H)   3.5 today, cont to hold coumadin  Pt states she ate some broccoli recently-discussed dietary modifications while on Coumadin  Recheck in a.m.  GERD (gastroesophageal reflux disease)  On Omeprazole 40 mg QD --- substitute with Protonix while inpatient   Acquired hypothyroidism   Latest Reference Range & Units 04/10/25 15:07   TSH 3RD GENERATON 0.450 - 4.500 uIU/mL 1.496     Continue Levothyroxine 50 mcg QD   Hyperlipidemia  On Simvastatin 40 mg QD , restart on discharge   Pulmonary hypertension (HCC)  On 4 L NC chronically, continue while inpatient   Chronic heart failure with preserved ejection fraction (HFpEF) (HCC)  Wt Readings from Last 3 Encounters:   04/10/25 73.7 kg (162 lb 7.7 oz)   03/03/25 81.3 kg (179 lb 3.7 oz)   01/31/25 79.8 kg (175 lb 14.8 oz)     Euvolemic on exam   In and out   Daily weight   Low salt diet and 2 L fluid restriction   History of transcatheter aortic valve replacement (TAVR)  On Aspirin , Statin , continue   On Coumadin - see supra therapeutic INR A&P   Coronary artery disease involving native coronary artery of native heart without angina pectoris  Continue ASA and statin   Denies CP   History of depression  Continue Sertraline 100 mg QD     VTE Pharmacologic Prophylaxis:    inr above range     Mobility:   Basic Mobility Inpatient Raw Score: 12  JH-HLM Goal: 4: Move to chair/commode  JH-HLM Achieved: 4: Move to chair/commode  JH-HLM Goal achieved.  Continue to encourage appropriate mobility.    Patient Centered Rounds: I performed bedside rounds with nursing staff today.   Discussions with Specialists or Other Care Team Provider: JOS      Current Length of Stay: 0 day(s)  Current Patient Status: Observation   Certification Statement: The patient will continue to require additional inpatient hospital stay due to blood work, tele   Discharge Plan: Anticipate discharge tomorrow to home.    Code Status: Level 3 - DNAR and DNI    Subjective   Sitting in chair, feels like she is wheezing. States she has an inhaler at home but doesn't know how to use it. Otherwise wants to go home.     Objective :  Temp:  [97.3 °F (36.3 °C)-98.4 °F (36.9 °C)] 98.4 °F (36.9 °C)  HR:  [60-61] 60  BP: (103-138)/(49-65) 134/65  Resp:  [18-20] 18  SpO2:  [85 %-98 %] 95 %  O2 Device: Nasal cannula  Nasal Cannula O2 Flow Rate (L/min):  [4 L/min] 4 L/min    Body mass index is 39.18 kg/m².     Input and Output Summary (last 24 hours):   No intake or output data in the 24 hours ending 04/11/25 1512    Physical Exam  Constitutional:       Appearance: She is obese.   Cardiovascular:      Rate and Rhythm: Normal rate and regular rhythm.   Pulmonary:      Effort: Pulmonary effort is normal.      Breath sounds: Normal breath sounds.      Comments: Upper airway wheeze   Abdominal:      General: There is no distension.      Palpations: Abdomen is soft.   Skin:     General: Skin is warm and dry.   Neurological:      General: No focal deficit present.      Mental Status: She is alert.             Lab Results: I have reviewed the following results:   Results from last 7 days   Lab Units 04/11/25  0516   WBC Thousand/uL 11.50*   HEMOGLOBIN g/dL 9.6*   HEMATOCRIT % 31.9*   PLATELETS Thousands/uL 423*   SEGS PCT % 72   LYMPHO PCT % 14   MONO PCT % 8   EOS PCT % 5     Results from last 7 days   Lab Units 04/11/25  0516   SODIUM mmol/L 139   POTASSIUM mmol/L 4.0   CHLORIDE mmol/L 106   CO2 mmol/L 27   BUN  mg/dL 18   CREATININE mg/dL 0.60   ANION GAP mmol/L 6   CALCIUM mg/dL 9.3   ALBUMIN g/dL 2.9*   TOTAL BILIRUBIN mg/dL 0.22   ALK PHOS U/L 97   ALT U/L 15   AST U/L 20   GLUCOSE RANDOM mg/dL 99     Results from last 7 days   Lab Units 04/11/25  0516   INR  3.54*     Recent Cultures (last 7 days):         Last 24 Hours Medication List:     Current Facility-Administered Medications:     acetaminophen (TYLENOL) tablet 650 mg, Q6H PRN    albuterol (PROVENTIL HFA,VENTOLIN HFA) inhaler 2 puff, Q4H PRN    aspirin (ECOTRIN LOW STRENGTH) EC tablet 81 mg, Daily    [START ON 4/12/2025] atenolol (TENORMIN) tablet 25 mg, Daily    furosemide (LASIX) tablet 40 mg, Daily    levalbuterol (XOPENEX) inhalation solution 0.63 mg, Once    levothyroxine tablet 50 mcg, Early Morning    lidocaine (LIDODERM) 5 % patch 1 patch, Daily PRN    nystatin (MYCOSTATIN) powder, BID    oxybutynin (DITROPAN-XL) 24 hr tablet 5 mg, Daily    pantoprazole (PROTONIX) EC tablet 40 mg, Early Morning    pravastatin (PRAVACHOL) tablet 40 mg, Daily With Dinner    pregabalin (LYRICA) capsule 25 mg, BID    sertraline (ZOLOFT) tablet 100 mg, Daily    Administrative Statements   Today, Patient Was Seen By: Estuardo Oliveira MD      **Please Note: This note may have been constructed using a voice recognition system.**

## 2025-04-11 NOTE — PLAN OF CARE
Problem: Potential for Falls  Goal: Patient will remain free of falls  Description: INTERVENTIONS:- Educate patient/family on patient safety including physical limitations- Instruct patient to call for assistance with activity - Consult OT/PT to assist with strengthening/mobility - Keep Call bell within reach- Keep bed low and locked with side rails adjusted as appropriate- Keep care items and personal belongings within reach- Initiate and maintain comfort rounds- Make Fall Risk Sign visible to staff- Offer Toileting every 2 Hours, in advance of need- Initiate/Maintain bed alarm- Obtain necessary fall risk management equipment: - Apply yellow socks and bracelet for high fall risk patients- Consider moving patient to room near nurses station  INTERVENTIONS:- Educate patient/family on patient safety including physical limitations- Instruct patient to call for assistance with activity - Consult OT/PT to assist with strengthening/mobility - Keep Call bell within reach- Keep bed low and locked with side rails adjusted as appropriate- Keep care items and personal belongings within reach- Initiate and maintain comfort rounds- Make Fall Risk Sign visible to staff- Offer Toileting every 2 Hours, in advance of need- Initiate/Maintain bed alarm- Obtain necessary fall risk management equipment:   Problem: SAFETY ADULT  Goal: Patient will remain free of falls  Description: INTERVENTIONS:- Educate patient/family on patient safety including physical limitations- Instruct patient to call for assistance with activity - Consult OT/PT to assist with strengthening/mobility - Keep Call bell within reach- Keep bed low and locked with side rails adjusted as appropriate- Keep care items and personal belongings within reach- Initiate and maintain comfort rounds- Make Fall Risk Sign visible to staff- Offer Toileting every 2 Hours, in advance of need- Initiate/Maintain bed alarm- Obtain necessary fall risk management equipment: - Apply  yellow socks and bracelet for high fall risk patients- Consider moving patient to room near nurses station  INTERVENTIONS:- Educate patient/family on patient safety including physical limitations- Instruct patient to call for assistance with activity - Consult OT/PT to assist with strengthening/mobility - Keep Call bell within reach- Keep bed low and locked with side rails adjusted as appropriate- Keep care items and personal belongings within reach- Initiate and maintain comfort rounds- Make Fall Risk Sign visible to staff- Offer Toileting every 2 Hours, in advance of need- Initiate/Maintain bed alarm- Obtain necessary fall risk management equipment: - Apply yellow socks and bracelet for high fall risk patients- Consider moving patient to room near nurses station  Outcome: Progressing  Goal: Maintain or return to baseline ADL function  Description: INTERVENTIONS:-  Assess patient's ability to carry out ADLs; assess patient's baseline for ADL function and identify physical deficits which impact ability to perform ADLs (bathing, care of mouth/teeth, toileting, grooming, dressing, etc.)- Assess/evaluate cause of self-care deficits - Assess range of motion- Assess patient's mobility; develop plan if impaired- Assess patient's need for assistive devices and provide as appropriate- Encourage maximum independence but intervene and supervise when necessary- Involve family in performance of ADLs- Assess for home care needs following discharge - Consider OT consult to assist with ADL evaluation and planning for discharge- Provide patient education as appropriate  Outcome: Progressing  Goal: Maintains/Returns to pre admission functional level  Description: INTERVENTIONS:- Perform AM-PAC 6 Click Basic Mobility/ Daily Activity assessment daily.- Set and communicate daily mobility goal to care team and patient/family/caregiver. - Collaborate with rehabilitation services on mobility goals if consulted- Perform Range of Motion 3  times a day.- Reposition patient every 2 hours.- Dangle patient 3 times a day- Stand patient 3 times a day- Ambulate patient 3 times a day- Out of bed to chair 3 times a day - Out of bed for meals 3  Problem: DISCHARGE PLANNING  Goal: Discharge to home or other facility with appropriate resources  Description: INTERVENTIONS:- Identify barriers to discharge w/patient and caregiver- Arrange for needed discharge resources and transportation as appropriate- Identify discharge learning needs (meds, wound care, etc.)- Arrange for interpretive services to assist at discharge as needed- Refer to Case Management Department for coordinating discharge planning if the patient needs post-hospital services based on physician/advanced practitioner order or complex needs related to functional status, cognitive ability, or social support system  Outcome: Progressing     Problem: Knowledge Deficit  Goal: Patient/family/caregiver demonstrates understanding of disease process, treatment plan, medications, and discharge instructions  Description: Complete learning assessment and assess knowledge base.Interventions:- Provide teaching at level of understanding- Provide teaching via preferred learning methods  Outcome: Progressing     Problem: Prexisting or High Potential for Compromised Skin Integrity  Goal: Skin integrity is maintained or improved  Description: INTERVENTIONS:- Identify patients at risk for skin breakdown- Assess and monitor skin integrity- Assess and monitor nutrition and hydration status- Monitor labs - Assess for incontinence - Turn and reposition patient- Assist with mobility/ambulation- Relieve pressure over bony prominences- Avoid friction and shearing- Provide appropriate hygiene as needed including keeping skin clean and dry- Evaluate need for skin moisturizer/barrier cream- Collaborate with interdisciplinary team - Patient/family teaching- Consider wound care consult   Outcome: Progressing    times a day- Out of  bed for toileting- Record patient progress and toleration of activity level   Outcome: Progressing   - Apply yellow socks and bracelet for high fall risk patients- Consider moving patient to room near nurses station  Outcome: Progressing     Problem: PAIN - ADULT  Goal: Verbalizes/displays adequate comfort level or baseline comfort level  Description: Interventions:- Encourage patient to monitor pain and request assistance- Assess pain using appropriate pain scale- Administer analgesics based on type and severity of pain and evaluate response- Implement non-pharmacological measures as appropriate and evaluate response- Consider cultural and social influences on pain and pain management- Notify physician/advanced practitioner if interventions unsuccessful or patient reports new pain  Outcome: Progressing     Problem: INFECTION - ADULT  Goal: Absence or prevention of progression during hospitalization  Description: INTERVENTIONS:- Assess and monitor for signs and symptoms of infection- Monitor lab/diagnostic results- Monitor all insertion sites, i.e. indwelling lines, tubes, and drains- Monitor endotracheal if appropriate and nasal secretions for changes in amount and color- [place] appropriate cooling/warming therapies per order- Administer medications as ordered- Instruct and encourage patient and family to use good hand hygiene technique- Identify and instruct in appropriate isolation precautions for identified infection/condition  Outcome: Progressing  Goal: Absence of fever/infection during neutropenic period  Description: INTERVENTIONS:- Monitor WBC  Outcome: Progressing

## 2025-04-11 NOTE — PLAN OF CARE
Problem: Potential for Falls  Goal: Patient will remain free of falls  Description: INTERVENTIONS:- Educate patient/family on patient safety including physical limitations- Instruct patient to call for assistance with activity - Consult OT/PT to assist with strengthening/mobility - Keep Call bell within reach- Keep bed low and locked with side rails adjusted as appropriate- Keep care items and personal belongings within reach- Initiate and maintain comfort rounds- Make Fall Risk Sign visible to staff- Offer Toileting every 2 Hours, in advance of need- Initiate/Maintain bed/chair alarm- Obtain necessary fall risk management equipment - Apply yellow socks and bracelet for high fall risk patients- Consider moving patient to room near nurses station  INTERVENTIONS:- Educate patient/family on patient safety including physical limitations- Instruct patient to call for assistance with activity - Consult OT/PT to assist with strengthening/mobility - Keep Call bell within reach- Keep bed low and locked with side rails adjusted as appropriate- Keep care items and personal belongings within reach- Initiate and maintain comfort rounds- Make Fall Risk Sign visible to staff- Offer Toileting every 2 Hours, in advance of need- Initiate/Maintain bed/chair alarm- Obtain necessary fall risk management equipment - Apply yellow socks and bracelet for high fall risk patients- Consider moving patient to room near nurses station  Outcome: Progressing     Problem: PAIN - ADULT  Goal: Verbalizes/displays adequate comfort level or baseline comfort level  Description: Interventions:- Encourage patient to monitor pain and request assistance- Assess pain using appropriate pain scale- Administer analgesics based on type and severity of pain and evaluate response- Implement non-pharmacological measures as appropriate and evaluate response- Consider cultural and social influences on pain and pain management- Notify physician/advanced practitioner  if interventions unsuccessful or patient reports new pain  Outcome: Progressing     Problem: INFECTION - ADULT  Goal: Absence or prevention of progression during hospitalization  Description: INTERVENTIONS:- Assess and monitor for signs and symptoms of infection- Monitor lab/diagnostic results- Monitor all insertion sites, i.e. indwelling lines, tubes, and drains- Monitor endotracheal if appropriate and nasal secretions for changes in amount and color- [place] appropriate cooling/warming therapies per order- Administer medications as ordered- Instruct and encourage patient and family to use good hand hygiene technique- Identify and instruct in appropriate isolation precautions for identified infection/condition  Outcome: Progressing  Goal: Absence of fever/infection during neutropenic period  Description: INTERVENTIONS:- Monitor WBC  Outcome: Progressing     Problem: SAFETY ADULT  Goal: Patient will remain free of falls  Description: INTERVENTIONS:- Educate patient/family on patient safety including physical limitations- Instruct patient to call for assistance with activity - Consult OT/PT to assist with strengthening/mobility - Keep Call bell within reach- Keep bed low and locked with side rails adjusted as appropriate- Keep care items and personal belongings within reach- Initiate and maintain comfort rounds- Make Fall Risk Sign visible to staff- Offer Toileting every 2 Hours, in advance of need- Initiate/Maintain bed/chair alarm- Obtain necessary fall risk management equipment - Apply yellow socks and bracelet for high fall risk patients- Consider moving patient to room near nurses station  INTERVENTIONS:- Educate patient/family on patient safety including physical limitations- Instruct patient to call for assistance with activity - Consult OT/PT to assist with strengthening/mobility - Keep Call bell within reach- Keep bed low and locked with side rails adjusted as appropriate- Keep care items and personal  belongings within reach- Initiate and maintain comfort rounds- Make Fall Risk Sign visible to staff- Offer Toileting every 2 Hours, in advance of need- Initiate/Maintain bed/chair alarm- Obtain necessary fall risk management equipment - Apply yellow socks and bracelet for high fall risk patients- Consider moving patient to room near nurses station  Outcome: Progressing  Goal: Maintain or return to baseline ADL function  Description: INTERVENTIONS:-  Assess patient's ability to carry out ADLs; assess patient's baseline for ADL function and identify physical deficits which impact ability to perform ADLs (bathing, care of mouth/teeth, toileting, grooming, dressing, etc.)- Assess/evaluate cause of self-care deficits - Assess range of motion- Assess patient's mobility; develop plan if impaired- Assess patient's need for assistive devices and provide as appropriate- Encourage maximum independence but intervene and supervise when necessary- Involve family in performance of ADLs- Assess for home care needs following discharge - Consider OT consult to assist with ADL evaluation and planning for discharge- Provide patient education as appropriate  Outcome: Progressing  Goal: Maintains/Returns to pre admission functional level  Description: INTERVENTIONS:- Perform AM-PAC 6 Click Basic Mobility/ Daily Activity assessment daily.- Set and communicate daily mobility goal to care team and patient/family/caregiver. - Collaborate with rehabilitation services on mobility goals if consulted- Perform Range of Motion 2 times a day.- Reposition patient every 2 hours.- Dangle patient 2 times a day- Stand patient 2 times a day- Ambulate patient 2 times a day- Out of bed to chair 2 times a day - Out of bed for meals 2 times a day- Out of bed for toileting- Record patient progress and toleration of activity level   Outcome: Progressing     Problem: DISCHARGE PLANNING  Goal: Discharge to home or other facility with appropriate  resources  Description: INTERVENTIONS:- Identify barriers to discharge w/patient and caregiver- Arrange for needed discharge resources and transportation as appropriate- Identify discharge learning needs (meds, wound care, etc.)- Arrange for interpretive services to assist at discharge as needed- Refer to Case Management Department for coordinating discharge planning if the patient needs post-hospital services based on physician/advanced practitioner order or complex needs related to functional status, cognitive ability, or social support system  Outcome: Progressing     Problem: Knowledge Deficit  Goal: Patient/family/caregiver demonstrates understanding of disease process, treatment plan, medications, and discharge instructions  Description: Complete learning assessment and assess knowledge base.Interventions:- Provide teaching at level of understanding- Provide teaching via preferred learning methods  Outcome: Progressing

## 2025-04-11 NOTE — TELEPHONE ENCOUNTER
----- Message from Lexi Durham PA-C sent at 4/11/2025  9:14 AM EDT -----  Regarding: Hospital Follow-Up  Cardiology Follow-up:    Patient clinical visit in 6 week at the Cardio location: [place] office. .    Schedule visit with Bro Esteves or Lexi Durham  or first available provider if scheduling difficulties.    Type of Visit: VISIT TYPE: in-person office visit.    Patient will require 40-60-minute hospital follow-up if scheduled with [facility].

## 2025-04-11 NOTE — CONSULTS
Consultation - Cardiology   Marisol Otoole 84 y.o. female MRN: [id number]  Unit/Bed#: 2 E 256-01 Encounter: [id number]  04/11/25  8:40 AM    Assessment/ Plan:   Assessment & Plan  Heart palpitations  Likely secondary to episodes of SVT and NSVT.  Discontinue metoprolol due to concern for GI side effects, and transition to atenolol 25 mg daily beginning tomorrow.  She was advised that she can take an extra 0.5 tablet of atenolol as needed for symptoms of palpitations.    SVT (supraventricular tachycardia) ([facility])  Transition from metoprolol to atenolol 25 mg daily tomorrow    NSVT (nonsustained ventricular tachycardia) ([facility])  Transition from metoprolol to atenolol per above  Recommend keeping K >4 and mag >2    Supratherapeutic INR  Management per primary team    Dyspnea  She endorses dyspnea, and is on NC with wheezing on exam.  She has possible history of mild COPD.  One-time dose of Xopenex nebulizer ordered to assess for symptomatic response.  Further management per primary team.    S/P placement of cardiac pacemaker  Sick sinus syndrome s/p MDT PPM (8/13/2024)  Continue to follow with the device clinic    Paroxysmal atrial fibrillation ([facility])  Rate control: Transition from metoprolol to atenolol per above.  Anticoagulation with warfarin currently on hold due to supratherapeutic INR.  Target INR 2-3.  [id number] 6.     Coronary artery disease involving native coronary artery of native heart without angina pectoris  Nonobstructive CAD  Continue aspirin, pravastatin.  Transition from metoprolol to atenolol per above    History of transcatheter aortic valve replacement (TAVR)  Severe aortic stenosis s/p TAVR (10/2019)  Continue aspirin.  Recommend routine surveillance with echocardiogram.    Chronic heart failure with preserved ejection fraction (HFpEF) ([facility])  Wt Readings from Last 3 Encounters:   04/10/25 73.7 kg (162 lb 7.7 oz)   03/03/25 81.3 kg (179 lb 3.7 oz)   01/31/25 79.8 kg (175 lb 14.8 oz)   EF 65%  Appears  euvolemic on exam  Diuretic: Continue Lasix 40 mg daily  Recommend low-sodium diet, daily weights, strict I's and O's.  Recommend continuing to monitor and replete electrolytes as needed.    Pulmonary hypertension (HCC)  Mild-moderate pulmonary hypertension; PASP 54 mmHg  Continue Lasix 40 mg daily    Moderate mitral valve stenosis  Recommend routine surveillance with echocardiogram    Primary hypertension  Transition from metoprolol to atenolol per above.  Continue Lasix 40 mg daily    Hyperlipidemia  Continue pravastatin        Cardiology will sign-off. Please reach out with any further questions or concerns.      History of Present Illness   Physician Requesting Consult: Estuardo Oliveira MD    Reason for Consult / Principal Problem: Palpitations    HPI: Marisol Otoole is a 84 y.o. year old female with PMHx of paroxysmal atrial fibrillation on Coumadin, sick sinus syndrome s/p MDT PPM (8/13/2024), severe aortic stenosis s/p TAVR (10/2019), chronic HFpEF, nonobstructive CAD, NSVT, SVT, hypertension, hyperlipidemia, mitral stenosis, tricuspid regurgitation, pulmonary hypertension, JANICE on CPAP, hypothyroidism, history of iron deficiency anemia, history of frequent UTIs who presents with palpitations.    She underwent device interrogation in late March 2025 for palpitations.  She was noted to have episodes of NSVT as well as SVT.  She was placed on Toprol 25 mg. She experienced diarrhea intermittently after starting the metoprolol, and reports her caretaker was concerned that it could be a side effect of the metoprolol. She was advised to take 0.5 tablet of metoprolol yesterday and stop metoprolol today. She was  planned to be transitioned to atenolol, but presented to the hospital before this medication adjustment could be made. She presented with palpitations, lightheadedness, weakness, and noted elevated heart rate on pulse ox at home. She noted the palpitations resolved after she started Toprol 25 mg daily, and  returned after she decreased her Toprol to 0.5 tablet. She denies chest pain. She does note dyspnea.  Denies lower extremity edema.  Cardiology was consulted for further evaluation.  Troponins negative x 3.  .  EKG without acute ischemic changes.  She was noted to be hypokalemic and hypomagnesemic upon presentation, which has been repleted since.  TSH WNL.  There was concern for possible accelerated junctional rhythm on EMS strips.  Device interrogation demonstrated multiple episodes of NSVT and SVT.    This patient follows with Dr. Esteves as her outpatient cardiologist.    Inpatient consult to Cardiology  Consult performed by: Lexi Durham PA-C  Consult ordered by: Estuardo Woodward DO        EKG: Atrial paced rhythm with prolonged AV conduction      Review of Systems   Constitutional:  Negative for diaphoresis, fever and unexpected weight change.   HENT:  Negative for ear pain and sore throat.    Eyes:  Negative for pain and redness.   Respiratory:  Positive for shortness of breath. Negative for cough.    Cardiovascular:  Positive for palpitations. Negative for chest pain and leg swelling.   Gastrointestinal:  Negative for abdominal pain and vomiting.   Genitourinary:  Negative for dysuria.   Skin:  Negative for color change and rash.   Neurological:  Positive for light-headedness. Negative for syncope.   Hematological:  Does not bruise/bleed easily.   Psychiatric/Behavioral:  Negative for agitation and behavioral problems.    All other systems reviewed and are negative.      Historical Information   Past Medical History:   Diagnosis Date    Anemia 08/22/2018    Anxiety     Arthritis     AVB (atrioventricular block)     first degree    Cataract     CHF (congestive heart failure) (HCC)     COPD, mild (HCC)     Coronary artery disease     Dislocation of right shoulder joint     Frequent UTI     GERD (gastroesophageal reflux disease)     H/O: pneumonia     Heme positive stool     Hyperlipidemia      Hypertension     Hypothyroidism     Morbid obesity with BMI of 50.0-59.9, adult (HCC)     Obesity, morbid (HCC) 08/22/2018    JANICE on CPAP     Pulmonary hypertension (HCC) 08/22/2018    Severe aortic stenosis     Simple goiter     Skin cyst     within the armpits, right    Wears glasses      Past Surgical History:   Procedure Laterality Date    BREAST BIOPSY      CARDIAC CATHETERIZATION      CARDIAC ELECTROPHYSIOLOGY PROCEDURE N/A 8/12/2024    Procedure: Cardiac pacer implant;  Surgeon: Clarke Zuluaga MD;  Location: [facility];  Service: Cardiology    CARPAL TUNNEL RELEASE Bilateral     CHOLECYSTECTOMY      DILATION AND CURETTAGE OF UTERUS      HYSTEROSCOPY      MASTOID SURGERY      KY COLONOSCOPY FLX DX W/COLLJ SPEC WHEN PFRMD N/A 9/6/2018    Procedure: COLONOSCOPY;  Surgeon: Shree Sosa III, MD;  Location: [facility];  Service: Gastroenterology    KY ECHO TRANSESOPHAG R-T 2D W/PRB IMG ACQUISJ I&R N/A 10/9/2018    Procedure: INTRA-OP TRANSESOPHAGEAL ECHOCARDIOGRAM (GARRISON);  Surgeon: Kushal Camarena DO;  Location:  MAIN OR;  Service: Cardiac Surgery    KY ESOPHAGOGASTRODUODENOSCOPY TRANSORAL DIAGNOSTIC N/A 8/31/2018    Procedure: ESOPHAGOGASTRODUODENOSCOPY (EGD);  Surgeon: Shree Sosa III, MD;  Location: [facility];  Service: Gastroenterology    KY REPLACE AORTIC VALVE OPENFEMORAL ARTERY APPROACH N/A 10/9/2018    Procedure: REPLACEMENT AORTIC VALVE TRANSCATHETER (TAVR) TRANSFEMORAL W/ 23 MM MENDOZA NOE S3 VALVE (ACCESS OF LEFT);  Surgeon: Kushal Camarena DO;  Location: [facility];  Service: Cardiac Surgery    TOTAL HIP ARTHROPLASTY Left 2007    TOTAL KNEE ARTHROPLASTY Bilateral      Social History     Substance and Sexual Activity   Alcohol Use Not Currently     Social History     Substance and Sexual Activity   Drug Use Never     Social History     Tobacco Use   Smoking Status Never    Passive exposure: Never   Smokeless Tobacco Never       Family History:   Family History   Problem Relation  "Age of Onset    Diabetes Mother     Stroke Mother     Cancer Father     Lung cancer Father     Diabetes Sister     Heart disease Sister     Hypertension Sister     Coronary artery disease Family     Diabetes Family     Hypertension Family     Cancer Family     Stroke Family     Thyroid disease Neg Hx        Meds/Allergies   all current active meds have been reviewed  Allergies   Allergen Reactions    Latex Rash    Neosporin [Neomycin-Bacitracin Zn-Polymyx] Rash and Other (See Comments)     hives per PACC order       Objective   Vitals: Blood pressure 135/65, pulse 60, temperature 98.4 °F (36.9 °C), resp. rate 18, height 4' 6\" (1.372 m), weight 73.7 kg (162 lb 7.7 oz), SpO2 93%, not currently breastfeeding., Body mass index is 39.18 kg/m².,   Orthostatic Blood Pressures      Flowsheet Row Most Recent Value   Blood Pressure 135/65 filed at 2025 0801   Patient Position - Orthostatic VS Lying filed at 04/10/2025 2300            Systolic (24hrs), Av , Min:103 , Max:138     Diastolic (24hrs), Av, Min:49, Max:65      No intake or output data in the 24 hours ending 25 0840    Invasive Devices       Peripheral Intravenous Line  Duration             Peripheral IV 04/10/25 Left Antecubital <1 day                        Physical Exam:    GEN: Alert and oriented x 3, in no acute distress.  Well appearing and well nourished.   HEENT: Sclera anicteric, conjunctivae pink, mucous membranes moist. Oropharynx clear.   NECK: Supple, no significant JVD. Trachea midline.   HEART: Regular rhythm, normal S1 and S2, no murmurs, clicks, gallops or rubs. PMI nondisplaced, no thrills.   LUNGS: Wheezes auscultation of bilateral lung fields. No increased work of breathing or signs of respiratory distress.   ABDOMEN: Soft, nontender, non-distended.   EXTREMITIES: Skin warm and well perfused, no clubbing, cyanosis, or edema.  NEURO: No focal findings. Normal speech. Mood and affect normal.   SKIN: Normal without suspicious " lesions on exposed skin.      Lab Results:     Troponins:   Results from last 7 days   Lab Units 04/10/25  2114 04/10/25  1816 04/10/25  1507   HS TNI 0HR ng/L  --   --  13   HS TNI 2HR ng/L  --  27  --    HS TNI 4HR ng/L 25  --   --    HSTNI D4 ng/L 12  --   --        CBC with diff:   Results from last 7 days   Lab Units 04/11/25  0516 04/10/25  1507   WBC Thousand/uL 11.50* 11.11*   HEMOGLOBIN g/dL 9.6* 9.9*   HEMATOCRIT % 31.9* 32.4*   MCV fL 87 85   PLATELETS Thousands/uL 423* 439*   RBC Million/uL 3.67* 3.81   MCH pg 26.2* 26.0*   MCHC g/dL 30.1* 30.6*   RDW % 20.2* 20.1*   MPV fL 9.3 9.0   NRBC AUTO /100 WBCs 0 0         CMP:   Results from last 7 days   Lab Units 04/11/25  0516 04/10/25  1507   POTASSIUM mmol/L 4.0 3.3*   CHLORIDE mmol/L 106 105   CO2 mmol/L 27 28   BUN mg/dL 18 17   CREATININE mg/dL 0.60 0.68   CALCIUM mg/dL 9.3 9.7   AST U/L 20 24   ALT U/L 15 16   ALK PHOS U/L 97 96   EGFR ml/min/1.73sq m 83 80       ** Please Note: Fluency DirectDictation voice to text software may have been used in the creation of this document. **

## 2025-04-11 NOTE — ASSESSMENT & PLAN NOTE
Severe aortic stenosis s/p TAVR (10/2019)  Continue aspirin.  Recommend routine surveillance with echocardiogram.

## 2025-04-11 NOTE — ASSESSMENT & PLAN NOTE
Likely secondary to episodes of SVT and NSVT.  Discontinue metoprolol due to concern for GI side effects, and transition to atenolol 25 mg daily beginning tomorrow.  She was advised that she can take an extra 0.5 tablet of atenolol as needed for symptoms of palpitations.

## 2025-04-11 NOTE — CASE MANAGEMENT
Case Management Progress Note    Patient name Marisol Otoole  Location 2 [facility] 256/2 E 256-01 MRN [id number]  : 1940 Date 2025       LOS (days): 0  Geometric Mean LOS (GMLOS) (days):   Days to GMLOS:        OBJECTIVE:        Current admission status: Observation  Preferred Pharmacy:   [id number]  [place] - [address] [address]  Phone: [phone] Fax: [phone]    [facility], [place] - [address]  [address] [address]  Phone: [phone] Fax: [phone]    Primary Care Provider: MABEL Hallman    Primary Insurance: MEDICARE  Secondary Insurance:     PROGRESS NOTE:  Per SLIM rounds, patient anticipated for d/c in 24hrs.  CM assessment pended.  Currently OBS status.  Last admission 25 - 3/4/25, with patient d/c to [facility] for STR placement.  Patient is established with OP CM team.  Current with [facility].  [facility] currently is 12.  No PT/OT orders noted on chart.  Patient lives at home w/ friend.  Uses AD at baseline.  Insured and established with a PCP.  IP CM will contact OP team upon d/c for follow-up.  Needs TBD at this time.

## 2025-04-11 NOTE — ASSESSMENT & PLAN NOTE
Nonobstructive CAD  Continue aspirin, pravastatin.  Transition from metoprolol to atenolol per above

## 2025-04-11 NOTE — ASSESSMENT & PLAN NOTE
Wt Readings from Last 3 Encounters:   04/10/25 73.7 kg (162 lb 7.7 oz)   03/03/25 81.3 kg (179 lb 3.7 oz)   01/31/25 79.8 kg (175 lb 14.8 oz)     Euvolemic on exam   In and out   Daily weight   Low salt diet and 2 L fluid restriction

## 2025-04-11 NOTE — ASSESSMENT & PLAN NOTE
Latest Reference Range & Units 03/13/25 05:48 03/20/25 07:12 03/27/25 08:31 04/10/25 15:07   POCT INR 0.85 - 1.19  2.2 (E) 4.87 (H) 2.69 (H) 3.71 (H)   3.5 today, cont to hold coumadin  Pt states she ate some broccoli recently-discussed dietary modifications while on Coumadin  Recheck in a.m.

## 2025-04-11 NOTE — ASSESSMENT & PLAN NOTE
She endorses dyspnea, and is on NC with wheezing on exam.  She has possible history of mild COPD.  One-time dose of Xopenex nebulizer ordered to assess for symptomatic response.  Further management per primary team.

## 2025-04-11 NOTE — ASSESSMENT & PLAN NOTE
Rate control: Transition from metoprolol to atenolol per above.  Anticoagulation with warfarin currently on hold due to supratherapeutic INR.  Target INR 2-3.  [id number] 6.

## 2025-04-11 NOTE — ASSESSMENT & PLAN NOTE
The patient was not taking her medication because she thought it caused diarrhea  Switch to atenolol per cardiology  No further symptoms here

## 2025-04-12 ENCOUNTER — APPOINTMENT (EMERGENCY)
Dept: CT IMAGING | Facility: HOSPITAL | Age: 85
End: 2025-04-12
Payer: MEDICARE

## 2025-04-12 ENCOUNTER — HOSPITAL ENCOUNTER (INPATIENT)
Facility: HOSPITAL | Age: 85
LOS: 1 days | Discharge: NURSING, REHAB OR LONG-TERM CARE | End: 2025-04-14
Admitting: STUDENT IN AN ORGANIZED HEALTH CARE EDUCATION/TRAINING PROGRAM
Payer: MEDICARE

## 2025-04-12 ENCOUNTER — APPOINTMENT (EMERGENCY)
Dept: RADIOLOGY | Facility: HOSPITAL | Age: 85
End: 2025-04-12
Payer: MEDICARE

## 2025-04-12 VITALS
SYSTOLIC BLOOD PRESSURE: 138 MMHG | TEMPERATURE: 97.9 F | DIASTOLIC BLOOD PRESSURE: 60 MMHG | WEIGHT: 162.48 LBS | HEIGHT: 55 IN | RESPIRATION RATE: 18 BRPM | HEART RATE: 58 BPM | OXYGEN SATURATION: 93 % | BODY MASS INDEX: 37.6 KG/M2

## 2025-04-12 DIAGNOSIS — S09.90XA CLOSED HEAD INJURY, INITIAL ENCOUNTER: ICD-10-CM
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
## 2025-04-12 DIAGNOSIS — S00.03XA HEMATOMA OF SCALP, INITIAL ENCOUNTER: ICD-10-CM

## 2025-04-12 LAB
ABO GROUP BLD: NORMAL
ALBUMIN SERPL BCG-MCNC: 3.4 G/DL (ref 3.5–5)
ALP SERPL-CCNC: 95 U/L (ref 34–104)
ALT SERPL W P-5'-P-CCNC: 17 U/L (ref 7–52)
ANION GAP SERPL CALCULATED.3IONS-SCNC: 5 MMOL/L (ref 4–13)
ANION GAP SERPL CALCULATED.3IONS-SCNC: 5 MMOL/L (ref 4–13)
APTT PPP: 42 SECONDS (ref 23–34)
AST SERPL W P-5'-P-CCNC: 31 U/L (ref 13–39)
BASOPHILS # BLD AUTO: 0.05 THOUSANDS/ÂΜL (ref 0–0.1)
BASOPHILS # BLD AUTO: 0.06 THOUSANDS/ÂΜL (ref 0–0.1)
BASOPHILS NFR BLD AUTO: 0 % (ref 0–1)
BASOPHILS NFR BLD AUTO: 1 % (ref 0–1)
BILIRUB SERPL-MCNC: 0.33 MG/DL (ref 0.2–1)
BLD GP AB SCN SERPL QL: NEGATIVE
BUN SERPL-MCNC: 24 MG/DL (ref 5–25)
BUN SERPL-MCNC: 27 MG/DL (ref 5–25)
CALCIUM ALBUM COR SERPL-MCNC: 10.4 MG/DL (ref 8.3–10.1)
CALCIUM SERPL-MCNC: 9.5 MG/DL (ref 8.4–10.2)
CALCIUM SERPL-MCNC: 9.9 MG/DL (ref 8.4–10.2)
CHLORIDE SERPL-SCNC: 101 MMOL/L (ref 96–108)
CHLORIDE SERPL-SCNC: 105 MMOL/L (ref 96–108)
CK SERPL-CCNC: 30 U/L (ref 26–192)
CO2 SERPL-SCNC: 29 MMOL/L (ref 21–32)
CO2 SERPL-SCNC: 32 MMOL/L (ref 21–32)
CREAT SERPL-MCNC: 0.77 MG/DL (ref 0.6–1.3)
CREAT SERPL-MCNC: 0.91 MG/DL (ref 0.6–1.3)
EOSINOPHIL # BLD AUTO: 0.64 THOUSAND/ÂΜL (ref 0–0.61)
EOSINOPHIL # BLD AUTO: 0.67 THOUSAND/ÂΜL (ref 0–0.61)
EOSINOPHIL NFR BLD AUTO: 5 % (ref 0–6)
EOSINOPHIL NFR BLD AUTO: 6 % (ref 0–6)
ERYTHROCYTE [DISTWIDTH] IN BLOOD BY AUTOMATED COUNT: 20.1 % (ref 11.6–15.1)
ERYTHROCYTE [DISTWIDTH] IN BLOOD BY AUTOMATED COUNT: 20.2 % (ref 11.6–15.1)
GFR SERPL CREATININE-BSD FRML MDRD: 57 ML/MIN/1.73SQ M
GFR SERPL CREATININE-BSD FRML MDRD: 70 ML/MIN/1.73SQ M
GLUCOSE P FAST SERPL-MCNC: 108 MG/DL (ref 65–99)
GLUCOSE SERPL-MCNC: 108 MG/DL (ref 65–140)
GLUCOSE SERPL-MCNC: 141 MG/DL (ref 65–140)
HCT VFR BLD AUTO: 32.3 % (ref 34.8–46.1)
HCT VFR BLD AUTO: 35 % (ref 34.8–46.1)
HGB BLD-MCNC: 10.2 G/DL (ref 11.5–15.4)
HGB BLD-MCNC: 9.8 G/DL (ref 11.5–15.4)
IMM GRANULOCYTES # BLD AUTO: 0.11 THOUSAND/UL (ref 0–0.2)
IMM GRANULOCYTES # BLD AUTO: 0.12 THOUSAND/UL (ref 0–0.2)
IMM GRANULOCYTES NFR BLD AUTO: 1 % (ref 0–2)
IMM GRANULOCYTES NFR BLD AUTO: 1 % (ref 0–2)
INR PPP: 1.59 (ref 0.85–1.19)
INR PPP: 2.24 (ref 0.85–1.19)
LYMPHOCYTES # BLD AUTO: 1.73 THOUSANDS/ÂΜL (ref 0.6–4.47)
LYMPHOCYTES # BLD AUTO: 1.8 THOUSANDS/ÂΜL (ref 0.6–4.47)
LYMPHOCYTES NFR BLD AUTO: 14 % (ref 14–44)
LYMPHOCYTES NFR BLD AUTO: 16 % (ref 14–44)
MCH RBC QN AUTO: 25.6 PG (ref 26.8–34.3)
MCH RBC QN AUTO: 26.3 PG (ref 26.8–34.3)
MCHC RBC AUTO-ENTMCNC: 29.1 G/DL (ref 31.4–37.4)
MCHC RBC AUTO-ENTMCNC: 30.3 G/DL (ref 31.4–37.4)
MCV RBC AUTO: 87 FL (ref 82–98)
MCV RBC AUTO: 88 FL (ref 82–98)
MONOCYTES # BLD AUTO: 1 THOUSAND/ÂΜL (ref 0.17–1.22)
MONOCYTES # BLD AUTO: 1.04 THOUSAND/ÂΜL (ref 0.17–1.22)
MONOCYTES NFR BLD AUTO: 8 % (ref 4–12)
MONOCYTES NFR BLD AUTO: 9 % (ref 4–12)
NEUTROPHILS # BLD AUTO: 7.44 THOUSANDS/ÂΜL (ref 1.85–7.62)
NEUTROPHILS # BLD AUTO: 9.55 THOUSANDS/ÂΜL (ref 1.85–7.62)
NEUTS SEG NFR BLD AUTO: 67 % (ref 43–75)
NEUTS SEG NFR BLD AUTO: 72 % (ref 43–75)
NRBC BLD AUTO-RTO: 0 /100 WBCS
NRBC BLD AUTO-RTO: 0 /100 WBCS
PLATELET # BLD AUTO: 405 THOUSANDS/UL (ref 149–390)
PLATELET # BLD AUTO: 463 THOUSANDS/UL (ref 149–390)
PMV BLD AUTO: 8.9 FL (ref 8.9–12.7)
PMV BLD AUTO: 9.1 FL (ref 8.9–12.7)
POTASSIUM SERPL-SCNC: 4.2 MMOL/L (ref 3.5–5.3)
POTASSIUM SERPL-SCNC: 4.4 MMOL/L (ref 3.5–5.3)
PROT SERPL-MCNC: 6.9 G/DL (ref 6.4–8.4)
PROTHROMBIN TIME: 19.7 SECONDS (ref 12.3–15)
PROTHROMBIN TIME: 25.4 SECONDS (ref 12.3–15)
RBC # BLD AUTO: 3.72 MILLION/UL (ref 3.81–5.12)
RBC # BLD AUTO: 3.99 MILLION/UL (ref 3.81–5.12)
RH BLD: POSITIVE
SODIUM SERPL-SCNC: 138 MMOL/L (ref 135–147)
SODIUM SERPL-SCNC: 139 MMOL/L (ref 135–147)
SPECIMEN EXPIRATION DATE: NORMAL
WBC # BLD AUTO: 11.02 THOUSAND/UL (ref 4.31–10.16)
WBC # BLD AUTO: 13.19 THOUSAND/UL (ref 4.31–10.16)

## 2025-04-12 PROCEDURE — 85025 COMPLETE CBC W/AUTO DIFF WBC: CPT | Performed by: STUDENT IN AN ORGANIZED HEALTH CARE EDUCATION/TRAINING PROGRAM

## 2025-04-12 PROCEDURE — 70450 CT HEAD/BRAIN W/O DYE: CPT

## 2025-04-12 PROCEDURE — 99285 EMERGENCY DEPT VISIT HI MDM: CPT

## 2025-04-12 PROCEDURE — 86901 BLOOD TYPING SEROLOGIC RH(D): CPT

## 2025-04-12 PROCEDURE — 71260 CT THORAX DX C+: CPT

## 2025-04-12 PROCEDURE — 72125 CT NECK SPINE W/O DYE: CPT

## 2025-04-12 PROCEDURE — 85610 PROTHROMBIN TIME: CPT

## 2025-04-12 PROCEDURE — 71045 X-RAY EXAM CHEST 1 VIEW: CPT

## 2025-04-12 PROCEDURE — 82550 ASSAY OF CK (CPK): CPT

## 2025-04-12 PROCEDURE — 85025 COMPLETE CBC W/AUTO DIFF WBC: CPT

## 2025-04-12 PROCEDURE — 36415 COLL VENOUS BLD VENIPUNCTURE: CPT

## 2025-04-12 PROCEDURE — 86850 RBC ANTIBODY SCREEN: CPT

## 2025-04-12 PROCEDURE — 86900 BLOOD TYPING SEROLOGIC ABO: CPT

## 2025-04-12 PROCEDURE — 74177 CT ABD & PELVIS W/CONTRAST: CPT

## 2025-04-12 PROCEDURE — 93005 ELECTROCARDIOGRAM TRACING: CPT

## 2025-04-12 PROCEDURE — 80048 BASIC METABOLIC PNL TOTAL CA: CPT | Performed by: STUDENT IN AN ORGANIZED HEALTH CARE EDUCATION/TRAINING PROGRAM

## 2025-04-12 PROCEDURE — 96365 THER/PROPH/DIAG IV INF INIT: CPT

## 2025-04-12 PROCEDURE — 85610 PROTHROMBIN TIME: CPT | Performed by: STUDENT IN AN ORGANIZED HEALTH CARE EDUCATION/TRAINING PROGRAM

## 2025-04-12 PROCEDURE — 80053 COMPREHEN METABOLIC PANEL: CPT

## 2025-04-12 PROCEDURE — 85730 THROMBOPLASTIN TIME PARTIAL: CPT

## 2025-04-12 RX ORDER — ATENOLOL 25 MG/1
25 TABLET ORAL DAILY
Qty: 30 TABLET | Refills: 0 | Status: SHIPPED | OUTPATIENT
Start: 2025-04-13 | End: 2025-05-13

## 2025-04-12 RX ORDER — ACETAMINOPHEN 10 MG/ML
1000 INJECTION, SOLUTION INTRAVENOUS ONCE
Status: COMPLETED | OUTPATIENT
Start: 2025-04-12 | End: 2025-04-13

## 2025-04-12 RX ADMIN — ASPIRIN 81 MG: 81 TABLET, COATED ORAL at 09:56

## 2025-04-12 RX ADMIN — PREGABALIN 25 MG: 25 CAPSULE ORAL at 09:56

## 2025-04-12 RX ADMIN — PANTOPRAZOLE SODIUM 40 MG: 40 TABLET, DELAYED RELEASE ORAL at 05:37

## 2025-04-12 RX ADMIN — FUROSEMIDE 40 MG: 40 TABLET ORAL at 09:56

## 2025-04-12 RX ADMIN — SERTRALINE HYDROCHLORIDE 100 MG: 100 TABLET ORAL at 09:56

## 2025-04-12 RX ADMIN — OXYBUTYNIN CHLORIDE 5 MG: 5 TABLET, EXTENDED RELEASE ORAL at 09:56

## 2025-04-12 RX ADMIN — NYSTATIN: 100000 POWDER TOPICAL at 10:00

## 2025-04-12 RX ADMIN — ACETAMINOPHEN 1000 MG: 10 INJECTION INTRAVENOUS at 23:41

## 2025-04-12 RX ADMIN — ATENOLOL 25 MG: 25 TABLET ORAL at 09:56

## 2025-04-12 RX ADMIN — LEVOTHYROXINE SODIUM 50 MCG: 0.05 TABLET ORAL at 05:37

## 2025-04-12 RX ADMIN — IOHEXOL 100 ML: 350 INJECTION, SOLUTION INTRAVENOUS at 22:49

## 2025-04-12 NOTE — PLAN OF CARE
Problem: Potential for Falls  Goal: Patient will remain free of falls  Description: INTERVENTIONS:- Educate patient/family on patient safety including physical limitations- Instruct patient to call for assistance with activity - Consult OT/PT to assist with strengthening/mobility - Keep Call bell within reach- Keep bed low and locked with side rails adjusted as appropriate- Keep care items and personal belongings within reach- Initiate and maintain comfort rounds- Make Fall Risk Sign visible to staff- Offer Toileting every 2 Hours, in advance of need- Initiate/Maintain bed alarm- Obtain necessary fall risk management equipment: - Apply yellow socks and bracelet for high fall risk patients- Consider moving patient to room near nurses station  INTERVENTIONS:- Educate patient/family on patient safety including physical limitations- Instruct patient to call for assistance with activity - Consult OT/PT to assist with strengthening/mobility - Keep Call bell within reach- Keep bed low and locked with side rails adjusted as appropriate- Keep care items and personal belongings within reach- Initiate and maintain comfort rounds- Make Fall Risk Sign visible to staff- Offer Toileting every 2 Hours, in advance of need- Initiate/Maintain bed alarm- Obtain necessary fall risk management equipment: - Apply yellow socks and bracelet for high fall risk patients- Consider moving patient to room near nurses station  Outcome: Progressing     Problem: PAIN - ADULT  Goal: Verbalizes/displays adequate comfort level or baseline comfort level  Description: Interventions:- Encourage patient to monitor pain and request assistance- Assess pain using appropriate pain scale- Administer analgesics based on type and severity of pain and evaluate response- Implement non-pharmacological measures as appropriate and evaluate response- Consider cultural and social influences on pain and pain management- Notify physician/advanced practitioner if  interventions unsuccessful or patient reports new pain  Outcome: Progressing     Problem: INFECTION - ADULT  Goal: Absence or prevention of progression during hospitalization  Description: INTERVENTIONS:- Assess and monitor for signs and symptoms of infection- Monitor lab/diagnostic results- Monitor all insertion sites, i.e. indwelling lines, tubes, and drains- Monitor endotracheal if appropriate and nasal secretions for changes in amount and color- [place] appropriate cooling/warming therapies per order- Administer medications as ordered- Instruct and encourage patient and family to use good hand hygiene technique- Identify and instruct in appropriate isolation precautions for identified infection/condition  Outcome: Progressing  Goal: Absence of fever/infection during neutropenic period  Description: INTERVENTIONS:- Monitor WBC  Outcome: Progressing     Problem: SAFETY ADULT  Goal: Patient will remain free of falls  Description: INTERVENTIONS:- Educate patient/family on patient safety including physical limitations- Instruct patient to call for assistance with activity - Consult OT/PT to assist with strengthening/mobility - Keep Call bell within reach- Keep bed low and locked with side rails adjusted as appropriate- Keep care items and personal belongings within reach- Initiate and maintain comfort rounds- Make Fall Risk Sign visible to staff- Offer Toileting every 2 Hours, in advance of need- Initiate/Maintain bed alarm- Obtain necessary fall risk management equipment: - Apply yellow socks and bracelet for high fall risk patients- Consider moving patient to room near nurses station  INTERVENTIONS:- Educate patient/family on patient safety including physical limitations- Instruct patient to call for assistance with activity - Consult OT/PT to assist with strengthening/mobility - Keep Call bell within reach- Keep bed low and locked with side rails adjusted as appropriate- Keep care items and personal belongings  within reach- Initiate and maintain comfort rounds- Make Fall Risk Sign visible to staff- Offer Toileting every 2 Hours, in advance of need- Initiate/Maintain bed alarm- Obtain necessary fall risk management equipment: - Apply yellow socks and bracelet for high fall risk patients- Consider moving patient to room near nurses station  Outcome: Progressing  Goal: Maintain or return to baseline ADL function  Description: INTERVENTIONS:-  Assess patient's ability to carry out ADLs; assess patient's baseline for ADL function and identify physical deficits which impact ability to perform ADLs (bathing, care of mouth/teeth, toileting, grooming, dressing, etc.)- Assess/evaluate cause of self-care deficits - Assess range of motion- Assess patient's mobility; develop plan if impaired- Assess patient's need for assistive devices and provide as appropriate- Encourage maximum independence but intervene and supervise when necessary- Involve family in performance of ADLs- Assess for home care needs following discharge - Consider OT consult to assist with ADL evaluation and planning for discharge- Provide patient education as appropriate  Outcome: Progressing  Goal: Maintains/Returns to pre admission functional level  Description: INTERVENTIONS:- Perform AM-PAC 6 Click Basic Mobility/ Daily Activity assessment daily.- Set and communicate daily mobility goal to care team and patient/family/caregiver. - Collaborate with rehabilitation services on mobility goals if consulted- Perform Range of Motion 2 times a day.- Reposition patient every 2 hours.- Dangle patient 2 times a day- Stand patient 2 times a day- Ambulate patient 2 times a day- Out of bed to chair 2 times a day - Out of bed for meals 2 times a day- Out of bed for toileting- Record patient progress and toleration of activity level   Outcome: Progressing     Problem: DISCHARGE PLANNING  Goal: Discharge to home or other facility with appropriate resources  Description:  INTERVENTIONS:- Identify barriers to discharge w/patient and caregiver- Arrange for needed discharge resources and transportation as appropriate- Identify discharge learning needs (meds, wound care, etc.)- Arrange for interpretive services to assist at discharge as needed- Refer to Case Management Department for coordinating discharge planning if the patient needs post-hospital services based on physician/advanced practitioner order or complex needs related to functional status, cognitive ability, or social support system  Outcome: Progressing     Problem: Knowledge Deficit  Goal: Patient/family/caregiver demonstrates understanding of disease process, treatment plan, medications, and discharge instructions  Description: Complete learning assessment and assess knowledge base.Interventions:- Provide teaching at level of understanding- Provide teaching via preferred learning methods  Outcome: Progressing     Problem: Prexisting or High Potential for Compromised Skin Integrity  Goal: Skin integrity is maintained or improved  Description: INTERVENTIONS:- Identify patients at risk for skin breakdown- Assess and monitor skin integrity- Assess and monitor nutrition and hydration status- Monitor labs - Assess for incontinence - Turn and reposition patient- Assist with mobility/ambulation- Relieve pressure over bony prominences- Avoid friction and shearing- Provide appropriate hygiene as needed including keeping skin clean and dry- Evaluate need for skin moisturizer/barrier cream- Collaborate with interdisciplinary team - Patient/family teaching- Consider wound care consult   Outcome: Progressing

## 2025-04-12 NOTE — QUICK NOTE
I have personally counseled the patient on medication indication, compliance, utilization and side effects. Patient may be discharged home with inhalers.

## 2025-04-13 LAB
ALBUMIN SERPL BCG-MCNC: 3.2 G/DL (ref 3.5–5)
ALP SERPL-CCNC: 86 U/L (ref 34–104)
ALT SERPL W P-5'-P-CCNC: 14 U/L (ref 7–52)
ANION GAP SERPL CALCULATED.3IONS-SCNC: 7 MMOL/L (ref 4–13)
AST SERPL W P-5'-P-CCNC: 20 U/L (ref 13–39)
ATRIAL RATE: 60 BPM
BASOPHILS # BLD AUTO: 0.06 THOUSANDS/ÂΜL (ref 0–0.1)
BASOPHILS NFR BLD AUTO: 1 % (ref 0–1)
BILIRUB SERPL-MCNC: 0.35 MG/DL (ref 0.2–1)
BUN SERPL-MCNC: 27 MG/DL (ref 5–25)
CALCIUM ALBUM COR SERPL-MCNC: 10.3 MG/DL (ref 8.3–10.1)
CALCIUM SERPL-MCNC: 9.7 MG/DL (ref 8.4–10.2)
CHLORIDE SERPL-SCNC: 102 MMOL/L (ref 96–108)
CO2 SERPL-SCNC: 30 MMOL/L (ref 21–32)
CREAT SERPL-MCNC: 0.75 MG/DL (ref 0.6–1.3)
EOSINOPHIL # BLD AUTO: 0.6 THOUSAND/ÂΜL (ref 0–0.61)
EOSINOPHIL NFR BLD AUTO: 5 % (ref 0–6)
ERYTHROCYTE [DISTWIDTH] IN BLOOD BY AUTOMATED COUNT: 19.9 % (ref 11.6–15.1)
GFR SERPL CREATININE-BSD FRML MDRD: 72 ML/MIN/1.73SQ M
GLUCOSE P FAST SERPL-MCNC: 92 MG/DL (ref 65–99)
GLUCOSE SERPL-MCNC: 92 MG/DL (ref 65–140)
HCT VFR BLD AUTO: 33.7 % (ref 34.8–46.1)
HGB BLD-MCNC: 10.2 G/DL (ref 11.5–15.4)
IMM GRANULOCYTES # BLD AUTO: 0.1 THOUSAND/UL (ref 0–0.2)
IMM GRANULOCYTES NFR BLD AUTO: 1 % (ref 0–2)
LYMPHOCYTES # BLD AUTO: 2.13 THOUSANDS/ÂΜL (ref 0.6–4.47)
LYMPHOCYTES NFR BLD AUTO: 18 % (ref 14–44)
MAGNESIUM SERPL-MCNC: 1.7 MG/DL (ref 1.9–2.7)
MCH RBC QN AUTO: 26 PG (ref 26.8–34.3)
MCHC RBC AUTO-ENTMCNC: 30.3 G/DL (ref 31.4–37.4)
MCV RBC AUTO: 86 FL (ref 82–98)
MONOCYTES # BLD AUTO: 0.91 THOUSAND/ÂΜL (ref 0.17–1.22)
MONOCYTES NFR BLD AUTO: 8 % (ref 4–12)
NEUTROPHILS # BLD AUTO: 8.12 THOUSANDS/ÂΜL (ref 1.85–7.62)
NEUTS SEG NFR BLD AUTO: 67 % (ref 43–75)
NRBC BLD AUTO-RTO: 0 /100 WBCS
P AXIS: 19 DEGREES
PHOSPHATE SERPL-MCNC: 4 MG/DL (ref 2.3–4.1)
PLATELET # BLD AUTO: 460 THOUSANDS/UL (ref 149–390)
PMV BLD AUTO: 9.5 FL (ref 8.9–12.7)
POTASSIUM SERPL-SCNC: 3.8 MMOL/L (ref 3.5–5.3)
PR INTERVAL: 254 MS
PROT SERPL-MCNC: 6.5 G/DL (ref 6.4–8.4)
QRS AXIS: -23 DEGREES
QRSD INTERVAL: 86 MS
QT INTERVAL: 416 MS
QTC INTERVAL: 416 MS
RBC # BLD AUTO: 3.93 MILLION/UL (ref 3.81–5.12)
SODIUM SERPL-SCNC: 139 MMOL/L (ref 135–147)
T WAVE AXIS: 58 DEGREES
VENTRICULAR RATE: 60 BPM
WBC # BLD AUTO: 11.92 THOUSAND/UL (ref 4.31–10.16)

## 2025-04-13 PROCEDURE — 84100 ASSAY OF PHOSPHORUS: CPT

## 2025-04-13 PROCEDURE — 80053 COMPREHEN METABOLIC PANEL: CPT

## 2025-04-13 PROCEDURE — 97167 OT EVAL HIGH COMPLEX 60 MIN: CPT

## 2025-04-13 PROCEDURE — 85025 COMPLETE CBC W/AUTO DIFF WBC: CPT

## 2025-04-13 PROCEDURE — 99222 1ST HOSP IP/OBS MODERATE 55: CPT | Performed by: STUDENT IN AN ORGANIZED HEALTH CARE EDUCATION/TRAINING PROGRAM

## 2025-04-13 PROCEDURE — 97163 PT EVAL HIGH COMPLEX 45 MIN: CPT

## 2025-04-13 PROCEDURE — 83735 ASSAY OF MAGNESIUM: CPT

## 2025-04-13 PROCEDURE — 96366 THER/PROPH/DIAG IV INF ADDON: CPT

## 2025-04-13 PROCEDURE — 93010 ELECTROCARDIOGRAM REPORT: CPT | Performed by: INTERNAL MEDICINE

## 2025-04-13 RX ORDER — PRAVASTATIN SODIUM 80 MG/1
80 TABLET ORAL
Status: DISCONTINUED | OUTPATIENT
Start: 2025-04-13 | End: 2025-04-14 | Stop reason: HOSPADM

## 2025-04-13 RX ORDER — MAGNESIUM SULFATE HEPTAHYDRATE 40 MG/ML
2 INJECTION, SOLUTION INTRAVENOUS ONCE
Status: COMPLETED | OUTPATIENT
Start: 2025-04-13 | End: 2025-04-13

## 2025-04-13 RX ORDER — WARFARIN SODIUM 5 MG/1
5 TABLET ORAL
Status: DISCONTINUED | OUTPATIENT
Start: 2025-04-13 | End: 2025-04-14 | Stop reason: HOSPADM

## 2025-04-13 RX ORDER — ASPIRIN 81 MG/1
81 TABLET ORAL DAILY
Status: DISCONTINUED | OUTPATIENT
Start: 2025-04-13 | End: 2025-04-14 | Stop reason: HOSPADM

## 2025-04-13 RX ORDER — LEVOTHYROXINE SODIUM 50 UG/1
50 TABLET ORAL
Status: DISCONTINUED | OUTPATIENT
Start: 2025-04-13 | End: 2025-04-14 | Stop reason: HOSPADM

## 2025-04-13 RX ORDER — NYSTATIN 100000 [USP'U]/G
POWDER TOPICAL 2 TIMES DAILY
Status: DISCONTINUED | OUTPATIENT
Start: 2025-04-13 | End: 2025-04-14 | Stop reason: HOSPADM

## 2025-04-13 RX ORDER — SERTRALINE HYDROCHLORIDE 100 MG/1
100 TABLET, FILM COATED ORAL DAILY
Status: DISCONTINUED | OUTPATIENT
Start: 2025-04-13 | End: 2025-04-14 | Stop reason: HOSPADM

## 2025-04-13 RX ORDER — MAGNESIUM HYDROXIDE/ALUMINUM HYDROXICE/SIMETHICONE 120; 1200; 1200 MG/30ML; MG/30ML; MG/30ML
30 SUSPENSION ORAL EVERY 6 HOURS PRN
Status: DISCONTINUED | OUTPATIENT
Start: 2025-04-13 | End: 2025-04-14 | Stop reason: HOSPADM

## 2025-04-13 RX ORDER — CALCIUM CARBONATE 500 MG/1
1000 TABLET, CHEWABLE ORAL DAILY PRN
Status: DISCONTINUED | OUTPATIENT
Start: 2025-04-13 | End: 2025-04-14 | Stop reason: HOSPADM

## 2025-04-13 RX ORDER — FUROSEMIDE 40 MG/1
40 TABLET ORAL DAILY
Status: DISCONTINUED | OUTPATIENT
Start: 2025-04-13 | End: 2025-04-14 | Stop reason: HOSPADM

## 2025-04-13 RX ORDER — OXYBUTYNIN CHLORIDE 5 MG/1
5 TABLET, EXTENDED RELEASE ORAL DAILY
Status: DISCONTINUED | OUTPATIENT
Start: 2025-04-13 | End: 2025-04-14 | Stop reason: HOSPADM

## 2025-04-13 RX ORDER — ACETAMINOPHEN 325 MG/1
650 TABLET ORAL EVERY 6 HOURS PRN
Status: DISCONTINUED | OUTPATIENT
Start: 2025-04-13 | End: 2025-04-14 | Stop reason: HOSPADM

## 2025-04-13 RX ORDER — PREGABALIN 25 MG/1
25 CAPSULE ORAL 2 TIMES DAILY
Status: DISCONTINUED | OUTPATIENT
Start: 2025-04-13 | End: 2025-04-14 | Stop reason: HOSPADM

## 2025-04-13 RX ORDER — WARFARIN SODIUM 2.5 MG/1
2.5 TABLET ORAL
Status: DISCONTINUED | OUTPATIENT
Start: 2025-04-13 | End: 2025-04-14 | Stop reason: HOSPADM

## 2025-04-13 RX ORDER — PANTOPRAZOLE SODIUM 40 MG/1
40 TABLET, DELAYED RELEASE ORAL
Status: DISCONTINUED | OUTPATIENT
Start: 2025-04-13 | End: 2025-04-14 | Stop reason: HOSPADM

## 2025-04-13 RX ORDER — ATENOLOL 25 MG/1
25 TABLET ORAL DAILY
Status: DISCONTINUED | OUTPATIENT
Start: 2025-04-13 | End: 2025-04-14 | Stop reason: HOSPADM

## 2025-04-13 RX ADMIN — FUROSEMIDE 40 MG: 40 TABLET ORAL at 09:03

## 2025-04-13 RX ADMIN — OXYBUTYNIN CHLORIDE 5 MG: 5 TABLET, EXTENDED RELEASE ORAL at 09:03

## 2025-04-13 RX ADMIN — LEVOTHYROXINE SODIUM 50 MCG: 0.05 TABLET ORAL at 05:08

## 2025-04-13 RX ADMIN — ASPIRIN 81 MG: 81 TABLET, COATED ORAL at 09:03

## 2025-04-13 RX ADMIN — PREGABALIN 25 MG: 25 CAPSULE ORAL at 09:03

## 2025-04-13 RX ADMIN — MAGNESIUM SULFATE HEPTAHYDRATE 2 G: 40 INJECTION, SOLUTION INTRAVENOUS at 15:03

## 2025-04-13 RX ADMIN — PREGABALIN 25 MG: 25 CAPSULE ORAL at 17:35

## 2025-04-13 RX ADMIN — ATENOLOL 25 MG: 25 TABLET ORAL at 09:03

## 2025-04-13 RX ADMIN — PRAVASTATIN SODIUM 80 MG: 80 TABLET ORAL at 17:35

## 2025-04-13 RX ADMIN — ACETAMINOPHEN 650 MG: 325 TABLET, FILM COATED ORAL at 05:08

## 2025-04-13 RX ADMIN — SERTRALINE HYDROCHLORIDE 100 MG: 100 TABLET ORAL at 09:03

## 2025-04-13 RX ADMIN — NYSTATIN: 100000 POWDER TOPICAL at 09:04

## 2025-04-13 RX ADMIN — WARFARIN SODIUM 5 MG: 5 TABLET ORAL at 17:35

## 2025-04-13 RX ADMIN — PANTOPRAZOLE SODIUM 40 MG: 40 TABLET, DELAYED RELEASE ORAL at 05:08

## 2025-04-13 RX ADMIN — NYSTATIN: 100000 POWDER TOPICAL at 17:46

## 2025-04-13 NOTE — PLAN OF CARE
Problem: PAIN - ADULT  Goal: Verbalizes/displays adequate comfort level or baseline comfort level  Description: Interventions:- Encourage patient to monitor pain and request assistance- Assess pain using appropriate pain scale- Administer analgesics based on type and severity of pain and evaluate response- Implement non-pharmacological measures as appropriate and evaluate response- Consider cultural and social influences on pain and pain management- Notify physician/advanced practitioner if interventions unsuccessful or patient reports new pain  Outcome: Progressing     Problem: INFECTION - ADULT  Goal: Absence or prevention of progression during hospitalization  Description: INTERVENTIONS:- Assess and monitor for signs and symptoms of infection- Monitor lab/diagnostic results- Monitor all insertion sites, i.e. indwelling lines, tubes, and drains- Monitor endotracheal if appropriate and nasal secretions for changes in amount and color- Riggins appropriate cooling/warming therapies per order- Administer medications as ordered- Instruct and encourage patient and family to use good hand hygiene technique- Identify and instruct in appropriate isolation precautions for identified infection/condition  Outcome: Progressing  Goal: Absence of fever/infection during neutropenic period  Description: INTERVENTIONS:- Monitor WBC  Outcome: Progressing     Problem: SAFETY ADULT  Goal: Patient will remain free of falls  Description: INTERVENTIONS:- Educate patient/family on patient safety including physical limitations- Instruct patient to call for assistance with activity - Consult OT/PT to assist with strengthening/mobility - Keep Call bell within reach- Keep bed low and locked with side rails adjusted as appropriate- Keep care items and personal belongings within reach- Initiate and maintain comfort rounds- Make Fall Risk Sign visible to staff- Offer Toileting every 2 Hours, in advance of need- Initiate/Maintain bed alarm-  Obtain necessary fall risk management equipment: bed alarm, nonskid socks- Apply yellow socks and bracelet for high fall risk patients- Consider moving patient to room near nurses station  Outcome: Progressing  Goal: Maintain or return to baseline ADL function  Description: INTERVENTIONS:-  Assess patient's ability to carry out ADLs; assess patient's baseline for ADL function and identify physical deficits which impact ability to perform ADLs (bathing, care of mouth/teeth, toileting, grooming, dressing, etc.)- Assess/evaluate cause of self-care deficits - Assess range of motion- Assess patient's mobility; develop plan if impaired- Assess patient's need for assistive devices and provide as appropriate- Encourage maximum independence but intervene and supervise when necessary- Involve family in performance of ADLs- Assess for home care needs following discharge - Consider OT consult to assist with ADL evaluation and planning for discharge- Provide patient education as appropriate  Outcome: Progressing  Goal: Maintains/Returns to pre admission functional level  Description: INTERVENTIONS:- Perform AM-PAC 6 Click Basic Mobility/ Daily Activity assessment daily.- Set and communicate daily mobility goal to care team and patient/family/caregiver. - Collaborate with rehabilitation services on mobility goals if consulted- Perform Range of Motion 4 times a day.- Reposition patient every 2 hours.- Dangle patient 4 times a day- Stand patient 4 times a day- Ambulate patient 4 times a day- Out of bed to chair 4 times a day - Out of bed for meals 4 times a day- Out of bed for toileting- Record patient progress and toleration of activity level   Outcome: Progressing     Problem: DISCHARGE PLANNING  Goal: Discharge to home or other facility with appropriate resources  Description: INTERVENTIONS:- Identify barriers to discharge w/patient and caregiver- Arrange for needed discharge resources and transportation as appropriate- Identify  discharge learning needs (meds, wound care, etc.)- Arrange for interpretive services to assist at discharge as needed- Refer to Case Management Department for coordinating discharge planning if the patient needs post-hospital services based on physician/advanced practitioner order or complex needs related to functional status, cognitive ability, or social support system  Outcome: Progressing     Problem: Knowledge Deficit  Goal: Patient/family/caregiver demonstrates understanding of disease process, treatment plan, medications, and discharge instructions  Description: Complete learning assessment and assess knowledge base.Interventions:- Provide teaching at level of understanding- Provide teaching via preferred learning methods  Outcome: Progressing     Problem: MUSCULOSKELETAL - ADULT  Goal: Maintain or return mobility to safest level of function  Description: INTERVENTIONS:- Assess patient's ability to carry out ADLs; assess patient's baseline for ADL function and identify physical deficits which impact ability to perform ADLs (bathing, care of mouth/teeth, toileting, grooming, dressing, etc.)- Assess/evaluate cause of self-care deficits - Assess range of motion- Assess patient's mobility- Assess patient's need for assistive devices and provide as appropriate- Encourage maximum independence but intervene and supervise when necessary- Involve family in performance of ADLs- Assess for home care needs following discharge - Consider OT consult to assist with ADL evaluation and planning for discharge- Provide patient education as appropriate  Outcome: Progressing  Goal: Maintain proper alignment of affected body part  Description: INTERVENTIONS:- Support, maintain and protect limb and body alignment- Provide patient/ family with appropriate education  Outcome: Progressing

## 2025-04-13 NOTE — ASSESSMENT & PLAN NOTE
Continue Coumadin.  Reevaluate dose per PT/INR.  EKG-sinus rhythm with a first-degree AV block.  PT/INR in AM.

## 2025-04-13 NOTE — PHYSICAL THERAPY NOTE
Physical Therapy Evaluation    Patient's Name: Marisol Otoole    Admitting Diagnosis  Head injury [S09.90XA]  Closed head injury, initial encounter [S09.90XA]  Hematoma of scalp, initial encounter [S00.03XA]  Fall, initial encounter [W19.XXXA]    Problem List  Patient Active Problem List   Diagnosis    GERD (gastroesophageal reflux disease)    Acquired hypothyroidism    Primary hypertension    Hyperlipidemia    LVH (left ventricular hypertrophy)    Mitral annular calcification    Moderate mitral valve stenosis    Pulmonary hypertension ([facility])    Iron deficiency anemia secondary to inadequate dietary iron intake    Morbid obesity due to excess calories ([facility])    Primary osteoarthritis of left shoulder    AVB (atrioventricular block)    Chronic heart failure with preserved ejection fraction (HFpEF) ([facility])    COPD, mild ([facility])    Coronary artery disease involving native coronary artery of native heart without angina pectoris    JANICE on CPAP    History of transcatheter aortic valve replacement (TAVR)    Insomnia    Dyspnea    Osteopenia    History of depression    Acute and chronic respiratory failure with hypoxia ([facility])    Radiculopathy, lumbar region    Orbital mass    Fall    Ambulatory dysfunction    Walker as ambulation aid    Junctional bradycardia    Anticoagulant long-term use    Paroxysmal atrial fibrillation ([facility])    Presence of permanent cardiac pacemaker    SSS (sick sinus syndrome) ([facility])    S/P placement of cardiac pacemaker    Non-rheumatic aortic stenosis    Nonrheumatic tricuspid valve regurgitation    Supratherapeutic INR    Acute on chronic diastolic (congestive) heart failure ([facility])    Heart palpitations    SVT (supraventricular tachycardia) ([facility])    NSVT (nonsustained ventricular tachycardia) ([facility])       Past Medical History  Past Medical History:   Diagnosis Date    Anemia 08/22/2018    Anxiety     Arthritis     AVB (atrioventricular block)     first degree    Cataract     CHF (congestive heart failure)  ([facility])     COPD, mild ([facility])     Coronary artery disease     Dislocation of right shoulder joint     Frequent UTI     GERD (gastroesophageal reflux disease)     H/O: pneumonia     Heme positive stool     Hyperlipidemia     Hypertension     Hypothyroidism     Morbid obesity with BMI of 50.0-59.9, adult ([facility])     Obesity, morbid ([facility]) 08/22/2018    JANICE on CPAP     Pulmonary hypertension ([facility]) 08/22/2018    Severe aortic stenosis     Simple goiter     Skin cyst     within the armpits, right    Wears glasses        Past Surgical History  Past Surgical History:   Procedure Laterality Date    BREAST BIOPSY      CARDIAC CATHETERIZATION      CARDIAC ELECTROPHYSIOLOGY PROCEDURE N/A 8/12/2024    Procedure: Cardiac pacer implant;  Surgeon: Clarke Zuluaga MD;  Location: [facility];  Service: Cardiology    CARPAL TUNNEL RELEASE Bilateral     CHOLECYSTECTOMY      DILATION AND CURETTAGE OF UTERUS      HYSTEROSCOPY      MASTOID SURGERY      DE COLONOSCOPY FLX DX W/COLLJ SPEC WHEN PFRMD N/A 9/6/2018    Procedure: COLONOSCOPY;  Surgeon: Shree Sosa III, MD;  Location: [facility];  Service: Gastroenterology    DE ECHO TRANSESOPHAG R-T 2D W/PRB IMG ACQUISJ I&R N/A 10/9/2018    Procedure: INTRA-OP TRANSESOPHAGEAL ECHOCARDIOGRAM (GARRISON);  Surgeon: Kushal Camarena DO;  Location: [facility];  Service: Cardiac Surgery    DE ESOPHAGOGASTRODUODENOSCOPY TRANSORAL DIAGNOSTIC N/A 8/31/2018    Procedure: ESOPHAGOGASTRODUODENOSCOPY (EGD);  Surgeon: Shree Sosa III, MD;  Location: [facility];  Service: Gastroenterology    DE REPLACE AORTIC VALVE OPENFEMORAL ARTERY APPROACH N/A 10/9/2018    Procedure: REPLACEMENT AORTIC VALVE TRANSCATHETER (TAVR) TRANSFEMORAL W/ 23 MM MENDOZA NOE S3 VALVE (ACCESS OF LEFT);  Surgeon: Kushal Camarena DO;  Location: [facility];  Service: Cardiac Surgery    TOTAL HIP ARTHROPLASTY Left 2007    TOTAL KNEE ARTHROPLASTY Bilateral        Recent Imaging  CT head without contrast   Final Result by Jose Guadalupe  "MD Sidney (04/12 8745)      No intracranial hemorrhage or calvarial fracture. Small suboccipital scalp hematoma.                  Workstation performed: [id number]         CT cervical spine without contrast   Final Result by Jose Guadalupe Little MD (04/12 1017)      No cervical spine fracture or traumatic malalignment.                  Workstation performed: [id number]         CT chest abdomen pelvis w contrast   Final Result by Jose Guadalupe Little MD (04/13 0012)      No findings of acute traumatic injury in the chest, abdomen or pelvis.      No displaced fracture identified.         Workstation performed: [id number]         XR chest 1 view portable   ED Interpretation by Phillip Vargas DO (04/12 0358)   No PTX as interpreted by me        Final Result by Dajuan Julien DO (04/13 0044)      Linear atelectasis in the right midlung field but no acute cardiopulmonary disease is seen.      Other findings as above.      Correlation with pending trauma CTs recommended.         Workstation performed: [id number]             Recent Vital Signs  Vitals:    04/13/25 0218 04/13/25 0218 04/13/25 0718 04/13/25 0903   BP:  144/65 133/71 122/65   BP Location:  Right arm Left arm    Pulse:  60 60 60   Resp:  18 18    Temp:  97.6 °F (36.4 °C) 97.8 °F (36.6 °C)    TempSrc:  Oral Oral    SpO2:  96% 97%    Weight: 79.3 kg (174 lb 13.2 oz)      Height: 4' 6\" (1.372 m)             04/13/25 1043   Pain Assessment   Pain Assessment Tool 0-10   Pain Score No Pain   Restrictions/Precautions   Weight Bearing Precautions Per Order No   Other Precautions Fall Risk;Multiple lines;Telemetry;Chair Alarm;Bed Alarm   Home Living   Type of Home House   Home Layout One level;Able to live on main level with bedroom/bathroom;Performs ADLs on one level;Ramped entrance   Bathroom Shower/Tub Walk-in shower   Bathroom Toilet Standard   Bathroom Equipment Grab bars in shower;Shower chair   Bathroom Accessibility Accessible   Home Equipment Cane;Other " (Comment)  (rollator)   Additional Comments Pt uses RW at baseline, pt reports using 2L O2 at night, RA during the day   Prior Function   Level of [place] Independent with ADLs;Independent with functional mobility;Needs assistance with IADLS   Lives With Friend(s)   Receives Help From Friend(s)   IADLs Family/Friend/Other provides transportation;Independent with meal prep;Independent with medication management  (cleaning with A and laundry with Ind)   Falls in the last 6 months 0   Vocational Retired   General   Family/Caregiver Present No   Cognition   Overall Cognitive Status WFL   Orientation Level Oriented X4   Memory Within functional limits   Following Commands Follows all commands and directions without difficulty   Comments Pt agreeable to PT session   RLE Assessment   RLE Assessment WFL  (grossly 3+/5 observed through functional mobility)   LLE Assessment   LLE Assessment WFL  (grossly 3+/5 observed through functional mobility)   Coordination   Sensation WFL   Light Touch   RLE Light Touch Grossly intact   LLE Light Touch Grossly intact   Bed Mobility   Supine to Sit 4  Minimal assistance   Additional items Increased time required;Verbal cues;LE management;Bedrails;HOB elevated;Assist x 2   Transfers   Sit to Stand 4  Minimal assistance   Additional items Assist x 1;Increased time required;Verbal cues;Armrests   Stand to Sit 4  Minimal assistance   Additional items Assist x 1;Increased time required;Verbal cues;Armrests   Additional Comments with RW   Ambulation/Elevation   Gait pattern Short stride;Excessively slow;Decreased heel strike;Decreased toe off;Decreased hip extension;Decreased foot clearance   Gait Assistance 4  Minimal assist   Additional items Assist x 1;Verbal cues   Assistive Device Rolling walker   Distance 10', 15'   Balance   Static Sitting Fair +   Dynamic Sitting Fair   Static Standing Fair   Dynamic Standing Fair -   Ambulatory Fair -   Activity Tolerance   Activity Tolerance  Patient limited by fatigue   Medical Staff Made Aware OT christal Millan seen as co-session with OT due to pt's co-morbidities, clinically unstable presentation, and present impairments   Nurse Made Aware Spoke to RN   Assessment   Prognosis Good   Problem List Decreased strength;Decreased endurance;Impaired balance;Decreased mobility   Assessment Marisol Otoole is a 84 y.o. female admitted to [facility] on 4/12/2025 for Fall. Pt  has a past medical history of Anemia (08/22/2018), Anxiety, Arthritis, AVB (atrioventricular block), Cataract, CHF (congestive heart failure) ([facility]), COPD, mild ([facility]), Coronary artery disease, Dislocation of right shoulder joint, Frequent UTI, GERD (gastroesophageal reflux disease), H/O: pneumonia, Heme positive stool, Hyperlipidemia, Hypertension, Hypothyroidism, Morbid obesity with BMI of 50.0-59.9, adult ([facility]), Obesity, morbid ([facility]) (08/22/2018), JANICE on CPAP, Pulmonary hypertension ([facility]) (08/22/2018), Severe aortic stenosis, Simple goiter, Skin cyst, and Wears glasses.. Order placed for PT eval and tx. PT was consulted and pt was seen on 4/13/2025 for mobility assessment and d/c planning. Chart review and two person identifiers were completed.   Currently pt presents with decreased strength , decreased static sitting balance , decreased dynamic sitting balance , decreased static standing balance, decreased dynamic standing balance , decreased gait speed, decreased step length , and decreased muscular endurance . Due to these impairments, they will require assistance to perform bed mobility, sit to stand , ambulation, stair negotiation, and transfers. Pt is currently functioning at a minimum assistance x2 level for bed mobility, minimum assistance x1 level for transfers, minimum assistance x1 level for ambulation with Rolling Walker. These activity limitations significantly impact their ability to participate in previous home and community roles and responsibilities  and ambulation in home. The  "patient's AM-PAC Basic Mobility Inpatient Short Form Raw Score is 15. PT recommends level II moderate resource intensity. They will benefit from skilled therapy to to reduce the risk of falls, to allow for safe ambulation, and to maximize functional potential.   Barriers to Discharge Inaccessible home environment;Decreased caregiver support;Other (Comment)  (decline in functional mobility)   Goals   STG Expiration Date 04/23/25   Short Term Goal #1 Within 10 days patient will complete: 1) Bed mobility skills with modified independent assistance to facilitate safe return to previous living environment 2) Functional transfers with modified independent assistance to facilitate safe return to previous living environment  3) Ambulation with least restrictive AD modified independent assistance without LOB and stable vitals for safe ambulation home/ community distances. 4) Stair training up/down flight 3 step/s with appropriate rail/s and modified independent assistance for safe access to previous living environment. 5) Improve balance grades to fair + to reduce risk of falls. 6)Improve LE strength grades by 1 to increase independence w/ transfers and gait.  7) PT for ongoing pt and family education; DME needs and D/C planning to promote highest level of function in least restrictive environment.   Plan   Treatment/Interventions Functional transfer training;LE strengthening/ROM;Therapeutic exercise;Endurance training;Patient/family training;Equipment eval/education;Bed mobility;Gait training;Continued evaluation;Spoke to nursing;OT   PT Frequency 3-5x/wk   Discharge Recommendation   Rehab Resource Intensity Level, PT II (Moderate Resource Intensity)   Equipment Recommended Walker   Walker Package Recommended Wheeled walker   Change/add to Walker Package? Yes, Change Size   Walker Size Supa (Ht <5'1\")   AM-PAC Basic Mobility Inpatient   Turning in Flat Bed Without Bedrails 2   Lying on Back to Sitting on Edge of Flat Bed " Without Bedrails 2   Moving Bed to Chair 3   Standing Up From Chair Using Arms 3   Walk in Room 3   Climb 3-5 Stairs With Railing 2   Basic Mobility Inpatient Raw Score 15   Basic Mobility Standardized Score 36.97   [facility] Highest Level Of Mobility   -[facility] Goal 4: Move to chair/commode   -[facility] Achieved 7: Walk 25 feet or more   End of Consult   Patient Position at End of Consult Bedside chair;All needs within reach;Bed/Chair alarm activated       Recommendations                                                                                                              Pt will benefit from continued skilled IP PT to address the above mentioned impairments in order to maximize recovery and increase functional independence when completing mobility and ADLs. See flow sheet for goals and POC.     PT Evaluation Time: [id number]     Bryant Tello, PT, DPT

## 2025-04-13 NOTE — OCCUPATIONAL THERAPY NOTE
Occupational Therapy Evaluation      Marisol Otoole    4/13/2025    Principal Problem:    Fall  Active Problems:    GERD (gastroesophageal reflux disease)    Acquired hypothyroidism    Primary hypertension    Hyperlipidemia    Ambulatory dysfunction    Paroxysmal atrial fibrillation (HCC)    Acute on chronic diastolic (congestive) heart failure (HCC)      Past Medical History:   Diagnosis Date    Anemia 08/22/2018    Anxiety     Arthritis     AVB (atrioventricular block)     first degree    Cataract     CHF (congestive heart failure) (HCC)     COPD, mild (HCC)     Coronary artery disease     Dislocation of right shoulder joint     Frequent UTI     GERD (gastroesophageal reflux disease)     H/O: pneumonia     Heme positive stool     Hyperlipidemia     Hypertension     Hypothyroidism     Morbid obesity with BMI of 50.0-59.9, adult (HCC)     Obesity, morbid (HCC) 08/22/2018    JANICE on CPAP     Pulmonary hypertension (HCC) 08/22/2018    Severe aortic stenosis     Simple goiter     Skin cyst     within the armpits, right    Wears glasses        Past Surgical History:   Procedure Laterality Date    BREAST BIOPSY      CARDIAC CATHETERIZATION      CARDIAC ELECTROPHYSIOLOGY PROCEDURE N/A 8/12/2024    Procedure: Cardiac pacer implant;  Surgeon: Clarke Zuluaga MD;  Location:  [facility]ARD[facility] CATH LAB;  Service: Cardiology    CARPAL TUNNEL RELEASE Bilateral     CHOLECYSTECTOMY      DILATION AND CURETTAGE OF UTERUS      HYSTEROSCOPY      MASTOID SURGERY      AR COLONOSCOPY FLX DX W/COLLJ SPEC WHEN PFRMD N/A 9/6/2018    Procedure: COLONOSCOPY;  Surgeon: Shree Sosa III, MD;  Location: [facility];  Service: Gastroenterology    AR ECHO TRANSESOPHAG R-T 2D W/PRB IMG ACQUISJ I&R N/A 10/9/2018    Procedure: INTRA-OP TRANSESOPHAGEAL ECHOCARDIOGRAM (GARRISON);  Surgeon: Kushal Camarena DO;  Location:  MAIN OR;  Service: Cardiac Surgery    AR ESOPHAGOGASTRODUODENOSCOPY TRANSORAL DIAGNOSTIC N/A 8/31/2018    Procedure: ESOPHAGOGASTRODUODENOSCOPY  (EGD);  Surgeon: Shree Sosa III, MD;  Location: [facility];  Service: Gastroenterology    AL REPLACE AORTIC VALVE OPENFEMORAL ARTERY APPROACH N/A 10/9/2018    Procedure: REPLACEMENT AORTIC VALVE TRANSCATHETER (TAVR) TRANSFEMORAL W/ 23 MM MENDOZA NOE S3 VALVE (ACCESS OF LEFT);  Surgeon: Kushal Camarena DO;  Location: [facility];  Service: Cardiac Surgery    TOTAL HIP ARTHROPLASTY Left 2007    TOTAL KNEE ARTHROPLASTY Bilateral        04/13/25 1022   OT Last Visit   OT Visit Date 04/13/25   Note Type   Note type Evaluation   Pain Assessment   Pain Assessment Tool 0-10   Pain Score No Pain   Restrictions/Precautions   Other Precautions Fall Risk;Telemetry;Multiple lines   Home Living   Type of Home House   Home Layout One level;Performs ADLs on one level;Able to live on main level with bedroom/bathroom;Ramped entrance   Bathroom Shower/Tub Walk-in shower   Bathroom Toilet Standard   Bathroom Equipment Grab bars in shower;Shower chair   Bathroom Accessibility Accessible   Home Equipment Cane;Other (Comment);Walker  (rollator)   Additional Comments Pt uses RW at baseline, pt reports using 2L O2 at night, RA during the day   Prior Function   Level of [place] Independent with ADLs;Independent with functional mobility;Needs assistance with IADLS   Lives With Friend(s)   Receives Help From Friend(s)   IADLs Family/Friend/Other provides transportation;Independent with meal prep;Independent with medication management  (cleaning with A and laundry with Ind)   Falls in the last 6 months 0   Vocational Retired   Lifestyle   Autonomy Pt reports PLOF was Ind with ADLs, A with IADLs, and (-) driving   Reciprocal Relationships friend   ADL   Eating Assistance 5  Supervision/Setup   Eating Deficit Increased time to complete;Supervision/safety;Setup   Grooming Assistance 5  Supervision/Setup   Grooming Deficit Increased time to complete;Supervision/safety;Verbal cueing;Steadying;Setup   UB Bathing Assistance 3  Moderate  "Assistance   UB Bathing Deficit Increased time to complete;Supervision/safety;Verbal cueing;Steadying;Setup   LB Bathing Assistance 2  Maximal Assistance   LB Bathing Deficit Increased time to complete;Supervision/safety;Verbal cueing;Steadying;Setup   UB Dressing Assistance 3  Moderate Assistance   UB Dressing Deficit Increased time to complete;Supervision/safety;Verbal cueing;Steadying;Setup   LB Dressing Assistance 2  Maximal Assistance   LB Dressing Deficit Increased time to complete;Supervision/safety;Verbal cueing;Steadying;Setup   Toileting Assistance  2  Maximal Assistance   Toileting Deficit Increased time to complete;Supervison/safety;Verbal cueing;Setup;Steadying   Functional Assistance 2  Maximal Assistance   Functional Deficit Increased time to complete;Supervision/safety;Verbal cueing;Steadying;Setup   Bed Mobility   Supine to Sit 4  Minimal assistance   Additional items Increased time required;Verbal cues;LE management;Bedrails;HOB elevated;Assist x 2   Transfers   Sit to Stand 4  Minimal assistance   Additional items Assist x 1;Increased time required;Verbal cues;Armrests   Stand to Sit 4  Minimal assistance   Additional items Assist x 1;Increased time required;Verbal cues;Armrests   Functional Mobility   Functional Mobility 4  Minimal assistance   Additional items Rolling walker   Balance   Static Sitting Fair +   Dynamic Sitting Fair   Static Standing Fair   Dynamic Standing Fair -   Ambulatory Fair -   Activity Tolerance   Activity Tolerance Patient limited by fatigue   Medical Staff Made Aware Co-evaluation performed with PT Bryant secondary to complex medical condition of patient, possible A of 2 required to achieve and maintain transitional movements, and pt's regression of functional status from baseline. PT/OT goals were addressed separately.   RUE Assessment   RUE Assessment   (functionally assessed: ROM WFL/ +3/5; reports chronic shoulder deficits from \"old injury\")   LUE Assessment   LUE " "Assessment   (functionally assessed: ROM WFL/ +3/5; reports chronic shoulder deficits from \"old injury\")   Hand Function   Gross Motor Coordination Functional   Fine Motor Coordination Functional   Sensation   Light Touch No apparent deficits   Sharp/Dull No apparent deficits   Stereognosis No apparent deficits   Vision-Basic Assessment   Current Vision Wears glasses all the time   Psychosocial   Psychosocial (WDL) WDL   Perception   Inattention/Neglect Appears intact   Cognition   Overall Cognitive Status WFL   Arousal/Participation Alert;Cooperative   Attention Within functional limits   Orientation Level Oriented X4   Memory Within functional limits   Following Commands Follows all commands and directions without difficulty   Comments Pt was agreeable to OT eval   Assessment   Limitation Decreased ADL status;Decreased UE strength;Decreased endurance;Decreased self-care trans;Decreased high-level ADLs   Prognosis Good   Assessment Pt is a 84 y.o. female seen for OT evaluation s/p admit to [facility] on 4/12/2025 w/ Fall. Comorbidities affecting pt's functional performance at time of assessment include:fall history and scalp hematoma, GERD, a-fib, HTN, primary OA, COPD, CAD, orbital mass, SSS, s/p placement of cardiac pacemaker, SVT, NSVT, and s/p cardiac pacemaker   . Orders placed for OT evaluation and treatment.  Performed at least two patient identifiers during session including name and wristband. Personal factors affecting pt at time of IE include:steps to enter environment, limited home support, difficulty performing ADLS, difficulty performing IADLS , limited insight into deficits, decreased initiation and engagement , financial barriers, health management , and environment. Prior to admission, pt reports Ind with ADLs, A with IADLs, and (+) driving.  Upon evaluation: Pt requires mod A with UB ADLs, max A with LB ADLs, min A of 2 with xfers and min A with functional mobility 2* the following deficits " impacting occupational performance: weakness, decreased strength, decreased dynamic sit/ stand balance, decreased activity tolerance, decreased standing tolerance time for self care and functional mobility, impaired problem solving, decreased safety awareness, impaired interpersonal skills, environmental deficits, decreased mobilty, and requiring external assistance to complete transitional movements. Pt to benefit from continued skilled OT tx while in the hospital to address deficits as defined above and maximize level of functional independence w ADL's and functional mobility. Occupational Performance areas to address include: grooming, bathing/shower, toilet hygiene, dressing, medication management, health maintenance, functional mobility, community mobility, clothing management, cleaning, and household maintenance. From OT standpoint, recommendation at time of d/c would be Level 2 (Mod Resource Intensity).   Plan   Treatment Interventions ADL retraining;Functional transfer training;UE strengthening/ROM;Endurance training;Patient/family training;Equipment evaluation/education;Compensatory technique education;Continued evaluation;Cardiac education;Energy conservation;Activityengagement   Goal Expiration Date 04/26/25   OT Frequency 3-5x/wk   Discharge Recommendation   Rehab Resource Intensity Level, OT II (Moderate Resource Intensity)   AM-PAC Daily Activity Inpatient   Lower Body Dressing 2   Bathing 2   Toileting 2   Upper Body Dressing 2   Grooming 3   Eating 3   Daily Activity Raw Score 14   Daily Activity Standardized Score (Calc for Raw Score >=11) 33.39   AM-PAC Applied Cognition Inpatient   Following a Speech/Presentation 4   Understanding Ordinary Conversation 4   Taking Medications 4   Remembering Where Things Are Placed or Put Away 4   Remembering List of 4-5 Errands 4   Taking Care of Complicated Tasks 3   Applied Cognition Raw Score 23   Applied Cognition Standardized Score 53.08       Occupational  Therapy goals: In 7-14 days:    Patient will verbalize and demonstrate use of energy conservation/ deep breathing technique and work simplification skills during functional activity with no verbal cues.    Patient will verbalize and demonstrate good body mechanics and joint protection techniques during  ADLs/ IADLs with no verbal cues.    Patient will increase OOB/ sitting tolerance to 2-4 hours per day for increased participation in self care and leisure tasks with no s/s of exertion.    Patient will increase standing tolerance time to 5  minutes with unilateral UE support to complete sink level ADLs@ mod I level.    Patient will increase sitting tolerance at edge of bed to 20 minutes to complete UB ADLs @ set up assist level.    Patient will transfer bed to Chair / toilet at Set up assist level with AD as indicated.    Patient will complete UB ADLs with set up assist.    Patient will complete LB ADLs with min assist with the use of adaptive equipment.    Patient will complete toileting hygiene with set up assist/ supervision for thoroughness.    Patient/ Family  will demonstrate competency with UE Home Exercise Program.       Yana Zuluaga MS OTR/L

## 2025-04-13 NOTE — ED PROVIDER NOTES
Emergency Department Trauma Note  Marisol Otoole 84 y.o. female MRN: [id number]  Unit/Bed#: 2 [facility] 252/2 E 252-01 Encounter: [id number]      Trauma Alert: Trauma Acuity: Trauma Evaluation  Model of Arrival:   via    Trauma Team: Current Providers  Attending Provider: Phillip Vargas DO  Attending Provider: Mateusz Castano MD  Attending Provider: Javier FORDE MD  Registered Nurse: Ronn Murray RN  ED Technician: Jose Angel Fraser  Registered Nurse: Adrienne Strong RN  Patient Care Assistant: Tracey Cuellar  Unit Clerk: Sarah Burks  Registered Nurse: Aubree Preciado RN  Patient Care Assistant: Danielle Cruz  Patient Care Assistant: Herminia Mansfield  Patient Care Assistant: Daniela Colon  Registered Nurse: Bertha Talley RN  Respiratory Therapist: Gerald Ambrocio, RT  Consultants:     None      History of Present Illness     Chief Complaint:   Chief Complaint   Patient presents with    Fall     Pt was in her chair eatting and fell out of her chair and hit her head, - LOC + thinners.     HPI:  Marisol Otoole is a 84 y.o. female with past medical history of pacemaker placement, aortic valve replacement on Coumadin, CHF, chronic O2 dependent respiratory failure secondary to COPD at 4 L nasal cannula oxygen, pulmonary hypertension, presenting to the ED via EMS from home for evaluation of fall.  Patient states that she was at the table eating cereal for dinner, but felt that she was too far from the table.  Patient states that she tried to scoot forward, but lost her balance in the chair, and the chair tipped backward, and patient fell backward.  She states that she struck the back of her head on her floor, did not lose consciousness.  Able to call EMS.  Per EMS, patient was GCS of 15, vitals were stable during transport.  She is complaining of some pain at the back of her head where she struck on the floor, as well as some neck pain, upper back discomfort and lower back discomfort. Per chart review, patient  recently hospitalized for heart palpitations, discharged yesterday.      Mechanism:           HPI  Review of Systems   All other systems reviewed and are negative.      Historical Information     Immunizations:   Immunization History   Administered Date(s) Administered    COVID-19 PFIZER VACCINE 0.3 ML IM 03/16/2021, 04/07/2021    Pneumococcal Conjugate 13-Valent 06/19/2018    Pneumococcal Polysaccharide PPV23 07/01/2019       Past Medical History:   Diagnosis Date    Anemia 08/22/2018    Anxiety     Arthritis     AVB (atrioventricular block)     first degree    Cataract     CHF (congestive heart failure) ([facility])     COPD, mild ([facility])     Coronary artery disease     Dislocation of right shoulder joint     Frequent UTI     GERD (gastroesophageal reflux disease)     H/O: pneumonia     Heme positive stool     Hyperlipidemia     Hypertension     Hypothyroidism     Morbid obesity with BMI of 50.0-59.9, adult ([facility])     Obesity, morbid ([facility]) 08/22/2018    JANICE on CPAP     Pulmonary hypertension ([facility]) 08/22/2018    Severe aortic stenosis     Simple goiter     Skin cyst     within the armpits, right    Wears glasses        Family History   Problem Relation Age of Onset    Diabetes Mother     Stroke Mother     Cancer Father     Lung cancer Father     Diabetes Sister     Heart disease Sister     Hypertension Sister     Coronary artery disease Family     Diabetes Family     Hypertension Family     Cancer Family     Stroke Family     Thyroid disease Neg Hx      Past Surgical History:   Procedure Laterality Date    BREAST BIOPSY      CARDIAC CATHETERIZATION      CARDIAC ELECTROPHYSIOLOGY PROCEDURE N/A 8/12/2024    Procedure: Cardiac pacer implant;  Surgeon: Clarke Zuluaga MD;  Location: [facility];  Service: Cardiology    CARPAL TUNNEL RELEASE Bilateral     CHOLECYSTECTOMY      DILATION AND CURETTAGE OF UTERUS      HYSTEROSCOPY      MASTOID SURGERY      KY COLONOSCOPY FLX DX W/COLLJ SPEC WHEN PFRMD N/A 9/6/2018    Procedure:  COLONOSCOPY;  Surgeon: Shree Sosa III, MD;  Location: [facility];  Service: Gastroenterology    IL ECHO TRANSESOPHAG R-T 2D W/PRB IMG ACQUISJ I&R N/A 10/9/2018    Procedure: INTRA-OP TRANSESOPHAGEAL ECHOCARDIOGRAM (GARRISON);  Surgeon: Kushal Camarena DO;  Location:  MAIN OR;  Service: Cardiac Surgery    IL ESOPHAGOGASTRODUODENOSCOPY TRANSORAL DIAGNOSTIC N/A 8/31/2018    Procedure: ESOPHAGOGASTRODUODENOSCOPY (EGD);  Surgeon: Shree Sosa III, MD;  Location: [facility];  Service: Gastroenterology    IL REPLACE AORTIC VALVE OPENFEMORAL ARTERY APPROACH N/A 10/9/2018    Procedure: REPLACEMENT AORTIC VALVE TRANSCATHETER (TAVR) TRANSFEMORAL W/ 23 MM MENDOZA NOE S3 VALVE (ACCESS OF LEFT);  Surgeon: Kushal Camarena DO;  Location: [facility];  Service: Cardiac Surgery    TOTAL HIP ARTHROPLASTY Left 2007    TOTAL KNEE ARTHROPLASTY Bilateral      Social History     Tobacco Use    Smoking status: Never     Passive exposure: Never    Smokeless tobacco: Never   Vaping Use    Vaping status: Never Used   Substance Use Topics    Alcohol use: Not Currently    Drug use: Never     E-Cigarette/Vaping    E-Cigarette Use Never User      E-Cigarette/Vaping Substances    Nicotine No     THC No     CBD No     Flavoring No     Other No     Unknown No        Family History: non-contributory    Meds/Allergies   Prior to Admission Medications   Prescriptions Last Dose Informant Patient Reported? Taking?   Blood Glucose Monitoring Suppl (OneTouch Verio Reflect) w/Device KIT  Self No No   Sig: Check blood sugars once daily. Please substitute with appropriate alternative as covered by patient's insurance. Dx: E11.65   Calcium Carb-Cholecalciferol (CALCIUM 600 + D PO)  Self Yes No   Sig: Take 1 tablet by mouth 2 (two) times a day   Cranberry 250 MG TABS  Self Yes No   Sig: Take by mouth   Lancets (onetouch ultrasoft) lancets  Self No No   Sig: Use in the morning Use as instructed   OneTouch Delica Lancets 33G MISC  Self No No   Sig:  Check blood sugars once daily. Please substitute with appropriate alternative as covered by patient's insurance. Dx: E11.65   acetaminophen (TYLENOL) 500 mg tablet  Self Yes No   Sig: Take 500 mg by mouth every 6 (six) hours as needed   aspirin (ECOTRIN LOW STRENGTH) 81 mg EC tablet  Self No No   Sig: Take 1 tablet (81 mg total) by mouth daily   atenolol (TENORMIN) 25 mg tablet   No No   Sig: Take 1 tablet (25 mg total) by mouth daily   b complex vitamins capsule  Self Yes No   Sig: Take 1 capsule by mouth 2 (two) times a day     benzonatate (TESSALON PERLES) 100 mg capsule   No No   Sig: Take 1 capsule (100 mg total) by mouth 3 (three) times a day as needed for cough   fluticasone (FLONASE) 50 mcg/act nasal spray  Self No No   Si spray into each nostril daily   furosemide (LASIX) 40 mg tablet   No No   Sig: TAKE 1 TABLET BY MOUTH EVERY DAY *NEW PRESCRIPTION REQUEST*   glucose blood (OneTouch Verio) test strip  Self No No   Sig: Check blood sugars once daily. Please substitute with appropriate alternative as covered by patient's insurance. Dx: E11.65   levothyroxine 50 mcg tablet   No No   Sig: TAKE 1 TABLET BY MOUTH EVERY DAY *NEW PRESCRIPTION REQUEST*   nystatin (MYCOSTATIN) powder   No No   Sig: Apply topically 2 (two) times a day   omeprazole (PriLOSEC) 40 MG capsule   No No   Sig: TAKE 1 CAPSULE BY MOUTH TWICE A DAY *NEW PRESCRIPTION REQUEST*   oxybutynin (DITROPAN-XL) 5 mg 24 hr tablet   No No   Sig: TAKE 1 TABLET (5 MG TOTAL) BY MOUTH DAILY. *NEW PRESCRIPTION REQUEST*   pregabalin (LYRICA) 25 mg capsule   No No   Sig: Take 1 capsule (25 mg total) by mouth 2 (two) times a day   sertraline (ZOLOFT) 100 mg tablet   No No   Sig: TAKE 1 TABLET BY MOUTH EVERY DAY *NEW PRESCRIPTION REQUEST*   simvastatin (ZOCOR) 40 mg tablet   No No   Sig: TAKE 1 TABLET BY MOUTH EVERYDAY AT BEDTIME *NEW PRESCRIPTION REQUEST*   warfarin (COUMADIN) 2.5 mg tablet   No No   Sig: Take 1 tablet (2.5mg) Mon-Sat. Take 2 tablets (5mg) on  Sun or as directed      Facility-Administered Medications: None       Allergies   Allergen Reactions    Latex Rash    Neosporin [Neomycin-Bacitracin Zn-Polymyx] Rash and Other (See Comments)     hives per [facility] order       PHYSICAL EXAM    PE limited by: N/A    Objective   Vitals:   First set: Temperature: 97.8 °F (36.6 °C) (04/12/25 2226)  Pulse: 60 (04/12/25 2226)  Respirations: 18 (04/12/25 2226)  Blood Pressure: (!) 182/77 (04/12/25 2226)  SpO2: 98 % (04/12/25 2226)    Primary Survey:   (A) Airway: intact  (B) Breathing: CTA bilaterally  (C) Circulation: Pulses:   normal  (D) Disabliity:  GCS Total:  15  (E) Expose:  Completed    Secondary Survey: (Click on Physical Exam tab above)  Physical Exam  Vitals and nursing note reviewed.   Constitutional:       General: She is not in acute distress.     Appearance: Normal appearance. She is well-developed and normal weight. She is not ill-appearing, toxic-appearing or diaphoretic.   HENT:      Head:      Comments: Occipital scalp hematoma without open wound but overlying tenderness noted.    No facial bone tenderness or crepitus.      Right Ear: Tympanic membrane and external ear normal.      Left Ear: Tympanic membrane and external ear normal.      Nose: Nose normal. No congestion.      Comments: No septal hematoma       Mouth/Throat:      Mouth: Mucous membranes are moist.      Pharynx: Oropharynx is clear. No oropharyngeal exudate or posterior oropharyngeal erythema.      Comments: No tongue biting or oral bleeding    Eyes:      Extraocular Movements: Extraocular movements intact.      Conjunctiva/sclera: Conjunctivae normal.      Pupils: Pupils are equal, round, and reactive to light.   Neck:      Comments: C-collar in place    Cardiovascular:      Rate and Rhythm: Normal rate and regular rhythm.      Pulses: Normal pulses.      Heart sounds: Normal heart sounds. No murmur heard.  Pulmonary:      Effort: Pulmonary effort is normal. No respiratory distress.       Breath sounds: Normal breath sounds. No wheezing, rhonchi or rales.   Chest:      Chest wall: No tenderness.   Abdominal:      General: Abdomen is flat.      Palpations: Abdomen is soft.      Tenderness: There is no abdominal tenderness. There is no guarding or rebound.   Musculoskeletal:         General: No swelling.      Thoracic back: Tenderness (mid thoracic) present. No swelling, edema, deformity or spasms.      Lumbar back: Tenderness (across lower back) present. No swelling, edema or deformity.      Right hip: Normal.      Left hip: Normal.      Right upper leg: Normal.      Left upper leg: Normal.      Right knee: Normal.      Left knee: Normal.      Right lower leg: Normal.      Left lower leg: Normal.      Right ankle: Normal.      Left ankle: Normal.      Right foot: Normal. Normal pulse.      Left foot: Normal. Normal pulse.      Comments: Bruising noted to the R lower back   Skin:     General: Skin is warm and dry.      Capillary Refill: Capillary refill takes less than 2 seconds.      Findings: Bruising (bruising R lower back) present. No erythema.   Neurological:      General: No focal deficit present.      Mental Status: She is alert and oriented to person, place, and time.   Psychiatric:         Mood and Affect: Mood normal.         Cervical spine cleared by clinical criteria? No (imaging required)      Invasive Devices       Peripheral Intravenous Line  Duration             Peripheral IV 04/12/25 Proximal;Right;Ventral (anterior) Forearm <1 day                    Lab Results:   Results Reviewed       Procedure Component Value Units Date/Time    Protime-INR [[id number]]  (Abnormal) Collected: 04/12/25 2239    Lab Status: Final result Specimen: Blood from Arm, Right Updated: 04/12/25 2309     Protime 19.7 seconds      INR 1.59    Narrative:      INR Therapeutic Range    Indication                                             INR Range      Atrial Fibrillation                                                2.0-3.0  Hypercoagulable State                                    2.0.2.3  Left Ventricular Asist Device                            2.0-3.0  Mechanical Heart Valve                                  -    Aortic(with afib, MI, embolism, HF, LA enlargement,    and/or coagulopathy)                                     2.0-3.0 (2.5-3.5)     Mitral                                                             2.5-3.5  Prosthetic/Bioprosthetic Heart Valve               2.0-3.0  Venous thromboembolism (VTE: VT, PE        2.0-3.0    APTT [[id number]]  (Abnormal) Collected: 04/12/25 2239    Lab Status: Final result Specimen: Blood from Arm, Right Updated: 04/12/25 2305     PTT 42 seconds     CK [[id number]]  (Normal) Collected: 04/12/25 2234    Lab Status: Final result Specimen: Blood from Arm, Right Updated: 04/12/25 2301     Total CK 30 U/L     Comprehensive metabolic panel [[id number]]  (Abnormal) Collected: 04/12/25 2234    Lab Status: Final result Specimen: Blood from Arm, Right Updated: 04/12/25 2301     Sodium 138 mmol/L      Potassium 4.4 mmol/L      Chloride 101 mmol/L      CO2 32 mmol/L      ANION GAP 5 mmol/L      BUN 27 mg/dL      Creatinine 0.91 mg/dL      Glucose 141 mg/dL      Calcium 9.9 mg/dL      Corrected Calcium 10.4 mg/dL      AST 31 U/L      ALT 17 U/L      Alkaline Phosphatase 95 U/L      Total Protein 6.9 g/dL      Albumin 3.4 g/dL      Total Bilirubin 0.33 mg/dL      eGFR 57 ml/min/1.73sq m     Narrative:      National Kid[employer] guidelines for Chronic Kidney Disease (CKD):     Stage 1 with normal or high GFR (GFR > 90 mL/min/1.73 square meters)    Stage 2 Mild CKD (GFR = 60-89 mL/min/1.73 square meters)    Stage 3A Moderate CKD (GFR = 45-59 mL/min/1.73 square meters)    Stage 3B Moderate CKD (GFR = 30-44 mL/min/1.73 square meters)    Stage 4 Severe CKD (GFR = 15-29 mL/min/1.73 square meters)    Stage 5 End Stage CKD (GFR <15 mL/min/1.73 square meters)  Note: GFR calculation is accurate only  with a steady state creatinine    CBC and differential [[id number]]  (Abnormal) Collected: 04/12/25 2234    Lab Status: Final result Specimen: Blood from Arm, Right Updated: 04/12/25 2239     WBC 13.19 Thousand/uL      RBC 3.99 Million/uL      Hemoglobin 10.2 g/dL      Hematocrit 35.0 %      MCV 88 fL      MCH 25.6 pg      MCHC 29.1 g/dL      RDW 20.1 %      MPV 9.1 fL      Platelets 463 Thousands/uL      nRBC 0 /100 WBCs      Segmented % 72 %      Immature Grans % 1 %      Lymphocytes % 14 %      Monocytes % 8 %      Eosinophils Relative 5 %      Basophils Relative 0 %      Absolute Neutrophils 9.55 Thousands/µL      Absolute Immature Grans 0.12 Thousand/uL      Absolute Lymphocytes 1.80 Thousands/µL      Absolute Monocytes 1.00 Thousand/µL      Eosinophils Absolute 0.67 Thousand/µL      Basophils Absolute 0.05 Thousands/µL                    Imaging Studies:   Direct to CT: Yes  CT head without contrast   Final Result by Jose Guadalupe Little MD (04/12 2343)      No intracranial hemorrhage or calvarial fracture. Small suboccipital scalp hematoma.                  Workstation performed: [id number]         CT cervical spine without contrast   Final Result by Jose Guadalupe Little MD (04/12 2359)      No cervical spine fracture or traumatic malalignment.                  Workstation performed: [id number]         CT chest abdomen pelvis w contrast   Final Result by Jose Guadalupe Little MD (04/13 0012)      No findings of acute traumatic injury in the chest, abdomen or pelvis.      No displaced fracture identified.         Workstation performed: [id number]         XR chest 1 view portable   ED Interpretation by Phillip Vargas DO (04/12 8656)   No PTX as interpreted by me        Final Result by Dajuan Julien DO (04/13 0044)      Linear atelectasis in the right midlung field but no acute cardiopulmonary disease is seen.      Other findings as above.      Correlation with pending trauma CTs recommended.         Workstation  performed: [id number]               Procedures  Procedures         ED Course  ED Course as of 04/13/25 2004   Sat Apr 12, 2025   2337 EKG: Paced at 60 bpm, , no change from previous EKG as interpreted by me     2353 CT head without contrast   2353 IMPRESSION:     No intracranial hemorrhage or calvarial fracture. Small suboccipital scalp hematoma.        Sun Apr 13, 2025   0002 CT cervical spine without contrast  IMPRESSION:     No cervical spine fracture or traumatic malalignment.     0018 CT chest abdomen pelvis w contrast  IMPRESSION:     No findings of acute traumatic injury in the chest, abdomen or pelvis.     No displaced fracture identified.             Medical Decision Making  Marisol Otoole is a 84 y.o. female with past medical history of pacemaker placement, aortic valve replacement on Coumadin, CHF, chronic O2 dependent respiratory failure secondary to COPD at 4 L nasal cannula oxygen, pulmonary hypertension, presenting to the ED via EMS from home for evaluation of fall.  Patient states that she was at the table eating cereal for dinner, but felt that she was too far from the table.  Patient states that she tried to scoot forward, but lost her balance in the chair, and the chair tipped backward, and patient fell backward.  She states that she struck the back of her head on her floor, did not lose consciousness.  Able to call EMS.  Per EMS, patient was GCS of 15, vitals were stable during transport.  She is complaining of some pain at the back of her head where she struck on the floor, as well as some neck pain, upper back discomfort and lower back discomfort. Per chart review, patient recently hospitalized for heart palpitations, discharged yesterday.    Trauma alert called for patient sustaining traumatic fall while on anticoagulation. Clinical exam documented below. Labs ordered to r/o anemia, electrolyte disturbance. Hemodynamically stable. Trauma scans ordered to r/o acute injuries.    Cervical  Collar Clearance:    The patient had a CT scan of the cervical spine demonstrating no acute injury. On exam, the patient had no midline point tenderness or paresthesias/numbness/weakness in the extremities. The patient had full range of motion (was then able to flex, extend, and rotate head laterally) without pain. There were no distracting injuries and the patient was not intoxicated.      The patient's cervical spine was cleared radiologically and clinically. Cervical collar removed at this time.    CT did not show any traumatic injury. Concern for ambulatory dysfunction at home. Patient was not evaluated by OT/PT on prior admission. Will arrange for admission for PT/OT eval as well as ambulatory dysfunction and further monitoring. Case discussed with [facility]; arrangements made for OBS.      Amount and/or Complexity of Data Reviewed  Labs: ordered.  Radiology: ordered and independent interpretation performed. Decision-making details documented in ED Course.    Risk  Prescription drug management.  Decision regarding hospitalization.                Disposition  [facility] Transfer: No  Final diagnoses:   Fall, initial encounter   Closed head injury, initial encounter   Hematoma of scalp, initial encounter     Time reflects when diagnosis was documented in both MDM as applicable and the Disposition within this note       Time User Action Codes Description Comment    4/13/2025 12:55 AM Phillip Vargas [W19.XXXA] Fall, initial encounter     4/13/2025 12:56 AM Phillip Vargas [S09.90XA] Closed head injury, initial encounter     4/13/2025  1:24 AM Phillip Vargas [S00.03XA] Hematoma of scalp, initial encounter           ED Disposition       ED Disposition   Admit    Condition   Stable    Date/Time   Sun Apr 13, 2025  1:24 AM    Comment   Admit to [facility]               Follow-up Information       Follow up With Specialties Details Why Contact Info    [facility]  Follow up  [address] [facility]  [place] [address] [phone]          Current Discharge Medication List        CONTINUE these medications which have NOT CHANGED    Details   acetaminophen (TYLENOL) 500 mg tablet Take 500 mg by mouth every 6 (six) hours as needed      aspirin (ECOTRIN LOW STRENGTH) 81 mg EC tablet Take 1 tablet (81 mg total) by mouth daily  Qty: 100 tablet, Refills: 0    Associated Diagnoses: S/P TAVR (transcatheter aortic valve replacement); Aortic stenosis, severe      atenolol (TENORMIN) 25 mg tablet Take 1 tablet (25 mg total) by mouth daily  Qty: 30 tablet, Refills: 0    Associated Diagnoses: SVT (supraventricular tachycardia) ([facility])      b complex vitamins capsule Take 1 capsule by mouth 2 (two) times a day        benzonatate (TESSALON PERLES) 100 mg capsule Take 1 capsule (100 mg total) by mouth 3 (three) times a day as needed for cough    Associated Diagnoses: Cough      Blood Glucose Monitoring Suppl (OneTouch Verio Reflect) w/Device KIT Check blood sugars once daily. Please substitute with appropriate alternative as covered by patient's insurance. Dx: E11.65  Qty: 1 kit, Refills: 0    Comments: Please substitute with appropriate alternative as covered by patient's insurance  Associated Diagnoses: Prediabetes      Calcium Carb-Cholecalciferol (CALCIUM 600 + D PO) Take 1 tablet by mouth 2 (two) times a day      Cranberry 250 MG TABS Take by mouth      fluticasone (FLONASE) 50 mcg/act nasal spray 1 spray into each nostril daily  Qty: 16 g, Refills: 1    Associated Diagnoses: Acute effusion of left ear      furosemide (LASIX) 40 mg tablet TAKE 1 TABLET BY MOUTH EVERY DAY *NEW PRESCRIPTION REQUEST*  Qty: 90 tablet, Refills: 1    Associated Diagnoses: Acute diastolic congestive heart failure (HCC)      glucose blood (OneTouch Verio) test strip Check blood sugars once daily. Please substitute with appropriate alternative as covered by patient's insurance. Dx: E11.65  Qty: 100 each, Refills: 3    Comments: Please  substitute with appropriate alternative as covered by patient's insurance  Associated Diagnoses: Prediabetes      !! Lancets (onetouch ultrasoft) lancets Use in the morning Use as instructed  Qty: 100 each, Refills: 1    Associated Diagnoses: Urinary frequency; Prediabetes      levothyroxine 50 mcg tablet TAKE 1 TABLET BY MOUTH EVERY DAY *NEW PRESCRIPTION REQUEST*  Qty: 30 tablet, Refills: 10    Associated Diagnoses: Acquired hypothyroidism      nystatin (MYCOSTATIN) powder Apply topically 2 (two) times a day  Qty: 60 g, Refills: 1    Associated Diagnoses: Intertrigo      omeprazole (PriLOSEC) 40 MG capsule TAKE 1 CAPSULE BY MOUTH TWICE A DAY *NEW PRESCRIPTION REQUEST*  Qty: 60 capsule, Refills: 10    Associated Diagnoses: Gastroesophageal reflux disease with esophagitis      !! OneTouch Delica Lancets 33G MISC Check blood sugars once daily. Please substitute with appropriate alternative as covered by patient's insurance. Dx: E11.65  Qty: 100 each, Refills: 3    Comments: Please substitute with appropriate alternative as covered by patient's insurance  Associated Diagnoses: Prediabetes      oxybutynin (DITROPAN-XL) 5 mg 24 hr tablet TAKE 1 TABLET (5 MG TOTAL) BY MOUTH DAILY. *NEW PRESCRIPTION REQUEST*  Qty: 30 tablet, Refills: 10    Associated Diagnoses: Urge incontinence      pregabalin (LYRICA) 25 mg capsule Take 1 capsule (25 mg total) by mouth 2 (two) times a day  Qty: 60 capsule, Refills: 0    Associated Diagnoses: DDD (degenerative disc disease), lumbar; Neuropathy      sertraline (ZOLOFT) 100 mg tablet TAKE 1 TABLET BY MOUTH EVERY DAY *NEW PRESCRIPTION REQUEST*  Qty: 30 tablet, Refills: 10    Associated Diagnoses: Depression with anxiety      simvastatin (ZOCOR) 40 mg tablet TAKE 1 TABLET BY MOUTH EVERYDAY AT BEDTIME *NEW PRESCRIPTION REQUEST*  Qty: 30 tablet, Refills: 10    Associated Diagnoses: Hyperlipidemia, unspecified hyperlipidemia type      warfarin (COUMADIN) 2.5 mg tablet Take 1 tablet (2.5mg)  Mon-Sat. Take 2 tablets (5mg) on Sun or as directed  Qty: 102 tablet, Refills: 0    Associated Diagnoses: Long term (current) use of anticoagulants       !! - Potential duplicate medications found. Please discuss with provider.        No discharge procedures on file.    PDMP Review         Value Time User    PDMP Reviewed  Yes 2/10/2025  2:28 PM MABEL Oliveira            ED Provider  Electronically Signed by           Phillip Vargas DO  04/13/25 2004

## 2025-04-13 NOTE — CASE MANAGEMENT
Case Management Assessment & Discharge Planning Note    Patient name Marisol Otoole  Location 2 [facility] 252/2 E 252-01 MRN [id number]  : 1940 Date 2025       Current Admission Date: 2025  Current Admission Diagnosis:Fall   Patient Active Problem List    Diagnosis Date Noted Date Diagnosed    SVT (supraventricular tachycardia) ([facility]) 2025     NSVT (nonsustained ventricular tachycardia) ([facility]) 2025     Heart palpitations 04/10/2025     Acute on chronic diastolic (congestive) heart failure ([facility]) 2025     Supratherapeutic INR 2025     SSS (sick sinus syndrome) ([facility]) 2024     S/P placement of cardiac pacemaker 2024     Non-rheumatic aortic stenosis 2024     Nonrheumatic tricuspid valve regurgitation 2024     Presence of permanent cardiac pacemaker 2024     Paroxysmal atrial fibrillation ([facility]) 2024     Junctional bradycardia 2024     Anticoagulant long-term use 2024     Walker as ambulation aid 2024     Ambulatory dysfunction 10/24/2023     Fall 2023     Orbital mass 2023     Radiculopathy, lumbar region 07/10/2021     Acute and chronic respiratory failure with hypoxia ([facility]) 06/10/2021     History of depression 2021     Osteopenia 10/22/2020     Dyspnea 2020     Insomnia 2020     History of transcatheter aortic valve replacement (TAVR) 10/09/2018     Primary osteoarthritis of left shoulder      AVB (atrioventricular block)      Chronic heart failure with preserved ejection fraction (HFpEF) ([facility])      COPD, mild ([facility])      Coronary artery disease involving native coronary artery of native heart without angina pectoris      JANICE on CPAP      Iron deficiency anemia secondary to inadequate dietary iron intake 2018     Morbid obesity due to excess calories ([facility]) 2018     Hyperlipidemia 2017     Primary hypertension 2017     GERD (gastroesophageal reflux disease) 10/29/2017      Acquired hypothyroidism 10/29/2017     LVH (left ventricular hypertrophy) 09/22/2016     Mitral annular calcification 09/22/2016     Moderate mitral valve stenosis 09/22/2016     Pulmonary hypertension ([facility]) 09/22/2016       LOS (days): 0  Geometric Mean LOS (GMLOS) (days):   Days to GMLOS:     OBJECTIVE:              Current admission status: Observation       Preferred Pharmacy:   [id number] [place] [facility] [address] [address]  Phone: [phone] Fax: [phone]    [facility] - [place], [place] - [address]  [address] [facility] [address]  Phone: [phone] Fax: [phone]    Primary Care Provider: MABEL Hallman    Primary Insurance: MEDICARE  Secondary Insurance:     ASSESSMENT:  Active Health Care Proxies       Yuridia Hurtado Health Care Representative - Friend   Primary Phone: [phone] (Mobile)                 Advance Directives  Does patient have a Health Care POA?: No  Was patient offered paperwork?: Yes (declined-states that her 's wife Yuridia is helping her complete this)  Does patient currently have a Health Care decision maker?: Yes, please see Health Care Proxy section  Does patient have Advance Directives?: No  Was patient offered paperwork?: Yes (declined-pt in the process of completing this with her 's wifes help)  Primary Contact: friend Angeles Сергей Olvera Notice Signed:  (not on workqueue)    Readmission Root Cause  30 Day Readmission: No    Patient Information  Admitted from:: Home  Mental Status: Alert  During Assessment patient was accompanied by: Not accompanied during assessment  Assessment information provided by:: Patient  Primary Caregiver: Self  Support Systems: Friend  County [place] Residence: [place]  [place]  Home entry access options. Select all that apply.: Ramp  Type of Current Residence: [place]  Living Arrangements: Lives w/ Friend (lives with friend  Angeles Rushing)  Is patient a ?: No    Activities of Daily Living Prior to Admission  Functional Status: Independent  Completes ADLs independently?: Yes  Ambulates independently?: Yes  Does patient use assisted devices?: Yes  Assisted Devices (DME) used: Walker, Home Oxygen concentrator, Portable Oxygen tanks  DME Company Name (respiratory supplies): [employer]  O2 Rate(s): 2lpm  Does patient currently own DME?: Yes  What DME does the patient currently own?: Walker  Does patient have a history of Outpatient Therapy (PT/OT)?: No  Does the patient have a history of Short-Term Rehab?: Yes ([facility])  Does patient have a history of HHC?: Yes  Does patient currently have HHC?: Yes ([facility])    Current Home Health Care  Type of Current Home Care Services: Home PT, Home OT, Home health aide, Nurse visit  Home Health Agency Name:: [facility]  Current Home Health Follow-Up Provider:: PCP    Patient Information Continued  Income Source: Unemployed  Does patient have prescription coverage?: Yes  Can the patient afford their medications and any related supplies (such as glucometers or test strips)?: Yes  Does patient receive dialysis treatments?: No  Does patient have a history of substance abuse?: No  Does patient have a history of Mental Health Diagnosis?: No         Means of Transportation  Means of Transport to Appts:: Friends          DISCHARGE DETAILS:    Discharge planning discussed with:: patient  Freedom of Choice: Yes  Comments - Freedom of Choice: CM discussed d/c needs including STR especially since this admission is due to a fall.  Pt refusing STR-wishes to be d/mark home with [facility] and with the assistance of her friend Angeles Rushing who lives with her.  CM contacted family/caregiver?: No- see comments (pt will update her friend herself)  Were Treatment Team discharge recommendations reviewed with patient/caregiver?: Yes  Did patient/caregiver verbalize understanding of patient care needs?: Yes  Were  patient/caregiver advised of the risks associated with not following Treatment Team discharge recommendations?: Yes    Contacts  Patient Contacts: Angeles  Relationship to Patient:: Friend    Requested Home Health Care         Is the patient interested in HHC at discharge?: Yes  Home Health Discipline requested:: Home Health Aide, Nursing, Occupational Therapy, Physical Therapy  Home Health Agency Name:: [facility]  HHA External Referral Reason (only applicable if external HHA name selected): Patient has established relationship with provider  Home Health Follow-Up Provider:: PCP  Home Health Services Needed:: Evaluate Functional Status and Safety, Gait/ADL Training, Strengthening/Theraputic Exercises to Improve Function  Homebound Criteria Met:: Uses an Assist Device (i.e. cane, walker, etc), Requires the Assistance of Another Person for Safe Ambulation or to Leave the Home  Supporting Clincal Findings:: Dyspnea with Exertion, Requires Oxygen, Fatigues Easliy in Short Distances, Limited Endurance    DME Referral Provided  Referral made for DME?: No    Other Referral/Resources/Interventions Provided:  Interventions: HHC  Referral Comments: Declining STR/To be discharged home and resume services with [facility]    Would you like to participate in our [facility] service program?  : No - Declined    Treatment Team Recommendation: Home with Home Health Care  Discharge Destination Plan:: Home with Home Health Care  Transport at Discharge : Family

## 2025-04-13 NOTE — PLAN OF CARE
Problem: OCCUPATIONAL THERAPY ADULT  Goal: Performs self-care activities at highest level of function for planned discharge setting.  See evaluation for individualized goals.  Description: Treatment Interventions: ADL retraining, Functional transfer training, UE strengthening/ROM, Endurance training, Patient/family training, Equipment evaluation/education, Compensatory technique education, Continued evaluation, Cardiac education, Energy conservation, Activityengagement          See flowsheet documentation for full assessment, interventions and recommendations.   Note: Limitation: Decreased ADL status, Decreased UE strength, Decreased endurance, Decreased self-care trans, Decreased high-level ADLs  Prognosis: Good  Assessment: Pt is a 84 y.o. female seen for OT evaluation s/p admit to [facility] on 4/12/2025 w/ Fall. Comorbidities affecting pt's functional performance at time of assessment include:fall history and scalp hematoma, GERD, a-fib, HTN, primary OA, COPD, CAD, orbital mass, SSS, s/p placement of cardiac pacemaker, SVT, NSVT, and s/p cardiac pacemaker   . Orders placed for OT evaluation and treatment.  Performed at least two patient identifiers during session including name and wristband. Personal factors affecting pt at time of IE include:steps to enter environment, limited home support, difficulty performing ADLS, difficulty performing IADLS , limited insight into deficits, decreased initiation and engagement , financial barriers, health management , and environment. Prior to admission, pt reports Ind with ADLs, A with IADLs, and (+) driving.  Upon evaluation: Pt requires mod A with UB ADLs, max A with LB ADLs, min A of 2 with xfers and min A with functional mobility 2* the following deficits impacting occupational performance: weakness, decreased strength, decreased dynamic sit/ stand balance, decreased activity tolerance, decreased standing tolerance time for self care and functional mobility,  impaired problem solving, decreased safety awareness, impaired interpersonal skills, environmental deficits, decreased mobilty, and requiring external assistance to complete transitional movements. Pt to benefit from continued skilled OT tx while in the hospital to address deficits as defined above and maximize level of functional independence w ADL's and functional mobility. Occupational Performance areas to address include: grooming, bathing/shower, toilet hygiene, dressing, medication management, health maintenance, functional mobility, community mobility, clothing management, cleaning, and household maintenance. From OT standpoint, recommendation at time of d/c would be Level 2 (Mod Resource Intensity).     Rehab Resource Intensity Level, OT: II (Moderate Resource Intensity)

## 2025-04-13 NOTE — PLAN OF CARE
Problem: PHYSICAL THERAPY ADULT  Goal: Performs mobility at highest level of function for planned discharge setting.  See evaluation for individualized goals.  Description: Treatment/Interventions: Functional transfer training, LE strengthening/ROM, Therapeutic exercise, Endurance training, Patient/family training, Equipment eval/education, Bed mobility, Gait training, Continued evaluation, Spoke to nursing, OT  Equipment Recommended: Walker       See flowsheet documentation for full assessment, interventions and recommendations.  Note: Prognosis: Good  Problem List: Decreased strength, Decreased endurance, Impaired balance, Decreased mobility  Assessment: Marisol Otoole is a 84 y.o. female admitted to [facility] on 4/12/2025 for Fall. Pt  has a past medical history of Anemia (08/22/2018), Anxiety, Arthritis, AVB (atrioventricular block), Cataract, CHF (congestive heart failure) ([facility]), COPD, mild ([facility]), Coronary artery disease, Dislocation of right shoulder joint, Frequent UTI, GERD (gastroesophageal reflux disease), H/O: pneumonia, Heme positive stool, Hyperlipidemia, Hypertension, Hypothyroidism, Morbid obesity with BMI of 50.0-59.9, adult ([facility]), Obesity, morbid ([facility]) (08/22/2018), JANICE on CPAP, Pulmonary hypertension ([facility]) (08/22/2018), Severe aortic stenosis, Simple goiter, Skin cyst, and Wears glasses.. Order placed for PT eval and tx. PT was consulted and pt was seen on 4/13/2025 for mobility assessment and d/c planning. Chart review and two person identifiers were completed.   Currently pt presents with decreased strength , decreased static sitting balance , decreased dynamic sitting balance , decreased static standing balance, decreased dynamic standing balance , decreased gait speed, decreased step length , and decreased muscular endurance . Due to these impairments, they will require assistance to perform bed mobility, sit to stand , ambulation, stair negotiation, and transfers. Pt is currently functioning at a  minimum assistance x2 level for bed mobility, minimum assistance x1 level for transfers, minimum assistance x1 level for ambulation with Rolling Walker. These activity limitations significantly impact their ability to participate in previous home and community roles and responsibilities  and ambulation in home. The patient's AM-PAC Basic Mobility Inpatient Short Form Raw Score is 15. PT recommends level II moderate resource intensity. They will benefit from skilled therapy to to reduce the risk of falls, to allow for safe ambulation, and to maximize functional potential.  Barriers to Discharge: Inaccessible home environment, Decreased caregiver support, Other (Comment) (decline in functional mobility)     Rehab Resource Intensity Level, PT: II (Moderate Resource Intensity)    See flowsheet documentation for full assessment.

## 2025-04-13 NOTE — H&P
H&P - Hospitalist   Name: Marisol Otoole 84 y.o. female I MRN: [id number]  Unit/Bed#: 2 E 252-01 I Date of Admission: 4/12/2025   Date of Service: 4/13/2025 I Hospital Day: 0     Assessment & Plan  Fall  Patient presents ED for mechanical fall at home. Patient lost her balance while eating dinner while seated and fell backwards to the floor.  Patient was recently hospitalized on 4/10/2025 to 4/11/2025, and was treated for heart palpitations.  During this time patient was seen by cardiology.  ED provider gave Tylenol.  Current vital signs: /65, HR 60, RR 18 temp 97.6 °F and oxygen saturation 96% on 4 L of oxygen nasal cannula.  CT head-negative.  CT cervical spine-negative.  CT chest abdomen pelvis-negative.  Chest x-ray-Linear atelectasis in the right midlung field but no acute cardiopulmonary disease is seen.  WBC-13.19.    Plan  Fall precautions.  Pain management.  Continue Lyrica.  PDMP reviewed.  Monitor for bleeding.  PT/OT evaluation and treat.  Case management consultation appreciated.  Ambulatory dysfunction  See above.  Primary hypertension  Current vital signs: /65, HR 60.  Monitor BP and HR.  Continue atenolol, and Lasix.  Acute on chronic diastolic (congestive) heart failure (HCC)  I's and O's.  Daily weights.  Continue Lasix.  Paroxysmal atrial fibrillation (HCC)  Continue Coumadin.  Reevaluate dose per PT/INR.  EKG-sinus rhythm with a first-degree AV block.  PT/INR in AM.  GERD (gastroesophageal reflux disease)  Continue Protonix.  Acquired hypothyroidism  Continue Synthroid.  Hyperlipidemia  Continue alternative pravastatin.    VTE Pharmacologic Prophylaxis:   High Risk (Score >/= 5) - Pharmacological DVT Prophylaxis Ordered: warfarin (Coumadin). Sequential Compression Devices Ordered.  Code Status: DNR/DNI  Discussion with family:  Patient.  Friend called but no answer.     Anticipated Length of Stay: Patient will be admitted on an observation basis with an anticipated length of stay of  less than 2 midnights secondary to fall.    History of Present Illness   Chief Complaint: Fall at home.    Marisol Otoole is a 84 y.o. female with a PMH of anemia, anxiety, arthritis, cataracts, CHF, COPD, CAD, GERD, hyperlipidemia, hypertension, hypothyroidism, morbid obesity, pulmonary hypertension, and JANICE who presents for mechanical fall at home. Patient lost her balance while eating dinner while seated and fell backwards to the floor.  Patient was recently hospitalized on 4/10/2025 to 4/11/2025, and was treated for heart palpitations.  During this time patient was seen by cardiology..    Review of Systems   Constitutional:  Negative for appetite change, chills and fever.   HENT:  Negative for congestion, ear pain, sore throat and trouble swallowing.    Eyes:  Negative for pain and visual disturbance.   Respiratory:  Negative for cough and shortness of breath.    Cardiovascular:  Positive for leg swelling. Negative for chest pain and palpitations.   Gastrointestinal:  Negative for abdominal pain, diarrhea, nausea and vomiting.   Genitourinary:  Negative for dysuria and hematuria.   Musculoskeletal:  Negative for arthralgias and back pain.   Skin:  Negative for color change and rash.   Neurological:  Negative for dizziness, seizures and syncope.   Psychiatric/Behavioral:  Negative for confusion and decreased concentration.    All other systems reviewed and are negative.      Historical Information   Past Medical History:   Diagnosis Date    Anemia 08/22/2018    Anxiety     Arthritis     AVB (atrioventricular block)     first degree    Cataract     CHF (congestive heart failure) (HCC)     COPD, mild (HCC)     Coronary artery disease     Dislocation of right shoulder joint     Frequent UTI     GERD (gastroesophageal reflux disease)     H/O: pneumonia     Heme positive stool     Hyperlipidemia     Hypertension     Hypothyroidism     Morbid obesity with BMI of 50.0-59.9, adult (HCC)     Obesity, morbid (HCC)  08/22/2018    JANICE on CPAP     Pulmonary hypertension (HCC) 08/22/2018    Severe aortic stenosis     Simple goiter     Skin cyst     within the armpits, right    Wears glasses      Past Surgical History:   Procedure Laterality Date    BREAST BIOPSY      CARDIAC CATHETERIZATION      CARDIAC ELECTROPHYSIOLOGY PROCEDURE N/A 8/12/2024    Procedure: Cardiac pacer implant;  Surgeon: Clarke Zuluaga MD;  Location:  CARDIAC CATH LAB;  Service: Cardiology    CARPAL TUNNEL RELEASE Bilateral     CHOLECYSTECTOMY      DILATION AND CURETTAGE OF UTERUS      HYSTEROSCOPY      MASTOID SURGERY      DC COLONOSCOPY FLX DX W/COLLJ SPEC WHEN PFRMD N/A 9/6/2018    Procedure: COLONOSCOPY;  Surgeon: Shree Sosa III, MD;  Location: [facility];  Service: Gastroenterology    DC ECHO TRANSESOPHAG R-T 2D W/PRB IMG ACQUISJ I&R N/A 10/9/2018    Procedure: INTRA-OP TRANSESOPHAGEAL ECHOCARDIOGRAM (GARRISON);  Surgeon: Kushal Camarena DO;  Location:  MAIN OR;  Service: Cardiac Surgery    DC ESOPHAGOGASTRODUODENOSCOPY TRANSORAL DIAGNOSTIC N/A 8/31/2018    Procedure: ESOPHAGOGASTRODUODENOSCOPY (EGD);  Surgeon: Shree Sosa III, MD;  Location: [facility];  Service: Gastroenterology    DC REPLACE AORTIC VALVE OPENFEMORAL ARTERY APPROACH N/A 10/9/2018    Procedure: REPLACEMENT AORTIC VALVE TRANSCATHETER (TAVR) TRANSFEMORAL W/ 23 MM MENDOZA NOE S3 VALVE (ACCESS OF LEFT);  Surgeon: Kushal Camarena DO;  Location:  MAIN OR;  Service: Cardiac Surgery    TOTAL HIP ARTHROPLASTY Left 2007    TOTAL KNEE ARTHROPLASTY Bilateral      Social History     Tobacco Use    Smoking status: Never     Passive exposure: Never    Smokeless tobacco: Never   Vaping Use    Vaping status: Never Used   Substance and Sexual Activity    Alcohol use: Not Currently    Drug use: Never    Sexual activity: Never     E-Cigarette/Vaping    E-Cigarette Use Never User      E-Cigarette/Vaping Substances    Nicotine No     THC No     CBD No     Flavoring No     Other No     Unknown  No      Family History   Problem Relation Age of Onset    Diabetes Mother     Stroke Mother     Cancer Father     Lung cancer Father     Diabetes Sister     Heart disease Sister     Hypertension Sister     Coronary artery disease Family     Diabetes Family     Hypertension Family     Cancer Family     Stroke Family     Thyroid disease Neg Hx      Social History:  Marital Status: Single   Occupation: N/A  Patient Pre-hospital Living Situation: Home  Patient Pre-hospital Level of Mobility: unable to be assessed at time of evaluation  Patient Pre-hospital Diet Restrictions: None    Meds/Allergies   I have reviewed home medications using recent Epic encounter.  Prior to Admission medications    Medication Sig Start Date End Date Taking? Authorizing Provider   acetaminophen (TYLENOL) 500 mg tablet Take 500 mg by mouth every 6 (six) hours as needed    Historical Provider, MD   aspirin (ECOTRIN LOW STRENGTH) 81 mg EC tablet Take 1 tablet (81 mg total) by mouth daily 10/11/18   Fatou Schmitz PA-C   atenolol (TENORMIN) 25 mg tablet Take 1 tablet (25 mg total) by mouth daily 4/13/25 5/13/25  Estuardo Oliveira MD   b complex vitamins capsule Take 1 capsule by mouth 2 (two) times a day      Historical Provider, MD   benzonatate (TESSALON PERLES) 100 mg capsule Take 1 capsule (100 mg total) by mouth 3 (three) times a day as needed for cough 3/4/25   MABEL De Anda   Blood Glucose Monitoring Suppl (OneTouch Verio Reflect) w/Device KIT Check blood sugars once daily. Please substitute with appropriate alternative as covered by patient's insurance. Dx: E11.65 6/28/24   MABEL Donahue   Calcium Carb-Cholecalciferol (CALCIUM 600 + D PO) Take 1 tablet by mouth 2 (two) times a day    Historical Provider, MD   Cranberry 250 MG TABS Take by mouth    Historical Provider, MD   fluticasone (FLONASE) 50 mcg/act nasal spray 1 spray into each nostril daily 8/22/24   MABEL Hallman   furosemide (LASIX) 40 mg tablet TAKE 1 TABLET  BY MOUTH EVERY DAY *NEW PRESCRIPTION REQUEST* 3/31/25   Bro Esteves MD   glucose blood (OneTouch Verio) test strip Check blood sugars once daily. Please substitute with appropriate alternative as covered by patient's insurance. Dx: E11.65 6/28/24   MABEL Donahue   Lancets (onetouch ultrasoft) lancets Use in the morning Use as instructed 6/28/24   MABEL Donahue   levothyroxine 50 mcg tablet TAKE 1 TABLET BY MOUTH EVERY DAY *NEW PRESCRIPTION REQUEST* 3/29/25   MABEL Hallman   nystatin (MYCOSTATIN) powder Apply topically 2 (two) times a day 4/3/25   MABEL Hallman   omeprazole (PriLOSEC) 40 MG capsule TAKE 1 CAPSULE BY MOUTH TWICE A DAY *NEW PRESCRIPTION REQUEST* 3/29/25   MABEL Hallman   OneTouch Delica Lancets 33G MISC Check blood sugars once daily. Please substitute with appropriate alternative as covered by patient's insurance. Dx: E11.65 6/28/24   MABEL Donahue   oxybutynin (DITROPAN-XL) 5 mg 24 hr tablet TAKE 1 TABLET (5 MG TOTAL) BY MOUTH DAILY. *NEW PRESCRIPTION REQUEST* 3/29/25   MABEL Hallman   pregabalin (LYRICA) 25 mg capsule Take 1 capsule (25 mg total) by mouth 2 (two) times a day 2/10/25   MABEL Oliveira   sertraline (ZOLOFT) 100 mg tablet TAKE 1 TABLET BY MOUTH EVERY DAY *NEW PRESCRIPTION REQUEST* 3/29/25   MABEL Hallman   simvastatin (ZOCOR) 40 mg tablet TAKE 1 TABLET BY MOUTH EVERYDAY AT BEDTIME *NEW PRESCRIPTION REQUEST* 3/29/25   MABEL Hallman   warfarin (COUMADIN) 2.5 mg tablet Take 1 tablet (2.5mg) Mon-Sat. Take 2 tablets (5mg) on Sun or as directed 4/4/25   Peace Borden PA-C   cyclobenzaprine (FLEXERIL) 5 mg tablet Take 1 tablet (5 mg total) by mouth 2 (two) times a day as needed for muscle spasms 8/30/22 9/5/22  MABEL Hallman   meloxicam (Mobic) 15 mg tablet Take 1 tablet (15 mg total) by mouth daily 8/30/22 9/5/22  MABEL Hallman     Allergies   Allergen Reactions    Latex Rash    Neosporin [Neomycin-Bacitracin Zn-Polymyx] Rash  and Other (See Comments)     hives per [facility] order       Objective :  Temp:  [97.6 °F (36.4 °C)-97.9 °F (36.6 °C)] 97.6 °F (36.4 °C)  HR:  [58-61] 60  BP: (118-182)/(58-77) 144/65  Resp:  [16-34] 18  SpO2:  [92 %-98 %] 96 %  O2 Device: Nasal cannula  Nasal Cannula O2 Flow Rate (L/min):  [4 L/min] 4 L/min    Physical Exam  Vitals and nursing note reviewed.   Constitutional:       General: She is not in acute distress.     Appearance: She is well-developed.   HENT:      Head: Normocephalic and atraumatic.      Nose: No congestion.      Mouth/Throat:      Mouth: Mucous membranes are moist.   Eyes:      Conjunctiva/sclera: Conjunctivae normal.   Cardiovascular:      Rate and Rhythm: Normal rate and regular rhythm.      Heart sounds: No murmur heard.  Pulmonary:      Effort: Pulmonary effort is normal. No respiratory distress.      Breath sounds: Normal breath sounds.      Comments: Supplementary oxygen.   Abdominal:      General: Bowel sounds are normal.      Palpations: Abdomen is soft.      Tenderness: There is no abdominal tenderness.   Musculoskeletal:         General: No swelling.      Cervical back: Neck supple.   Skin:     General: Skin is warm and dry.      Capillary Refill: Capillary refill takes less than 2 seconds.   Neurological:      Mental Status: She is alert and oriented to person, place, and time.      GCS: GCS eye subscore is 4. GCS verbal subscore is 5. GCS motor subscore is 6.      Motor: Weakness present.      Gait: Gait abnormal.   Psychiatric:         Mood and Affect: Mood normal.         Behavior: Behavior is cooperative.          Lines/Drains:      Lab Results: I have reviewed the following results:  Results from last 7 days   Lab Units 04/12/25  2234   WBC Thousand/uL 13.19*   HEMOGLOBIN g/dL 10.2*   HEMATOCRIT % 35.0   PLATELETS Thousands/uL 463*   SEGS PCT % 72   LYMPHO PCT % 14   MONO PCT % 8   EOS PCT % 5     Results from last 7 days   Lab Units 04/12/25  2234   SODIUM mmol/L 138   POTASSIUM  mmol/L 4.4   CHLORIDE mmol/L 101   CO2 mmol/L 32   BUN mg/dL 27*   CREATININE mg/dL 0.91   ANION GAP mmol/L 5   CALCIUM mg/dL 9.9   ALBUMIN g/dL 3.4*   TOTAL BILIRUBIN mg/dL 0.33   ALK PHOS U/L 95   ALT U/L 17   AST U/L 31   GLUCOSE RANDOM mg/dL 141*     Results from last 7 days   Lab Units 04/12/25  2239   INR  1.59*         Lab Results   Component Value Date    HGBA1C 6.1 (H) 08/18/2023    HGBA1C 5.7 (H) 07/11/2023    HGBA1C 5.9 (H) 03/17/2023           Imaging Results Review: I reviewed radiology reports from this admission including: chest xray, CT chest, CT abdomen/pelvis, CT head, and CT C-spine.  Other Study Results Review: EKG was reviewed.     Administrative Statements   I have spent a total time of 45 minutes in caring for this patient on the day of the visit/encounter including Diagnostic results, Patient and family education, Impressions, Documenting in the medical record, Reviewing/placing orders in the medical record (including tests, medications, and/or procedures), Obtaining or reviewing history  , and Communicating with other healthcare professionals .    ** Please Note: This note has been constructed using a voice recognition system. **

## 2025-04-13 NOTE — ASSESSMENT & PLAN NOTE
Patient presents ED for mechanical fall at home. Patient lost her balance while eating dinner while seated and fell backwards to the floor.  Patient was recently hospitalized on 4/10/2025 to 4/11/2025, and was treated for heart palpitations.  During this time patient was seen by cardiology.  ED provider gave Tylenol.  Current vital signs: /65, HR 60, RR 18 temp 97.6 °F and oxygen saturation 96% on 4 L of oxygen nasal cannula.  CT head-negative.  CT cervical spine-negative.  CT chest abdomen pelvis-negative.  Chest x-ray-Linear atelectasis in the right midlung field but no acute cardiopulmonary disease is seen.  WBC-13.19.    Plan  Fall precautions.  Pain management.  Continue Lyrica.  PDMP reviewed.  Monitor for bleeding.  PT/OT evaluation and treat.  Case management consultation appreciated.

## 2025-04-14 ENCOUNTER — PATIENT OUTREACH (OUTPATIENT)
Dept: CASE MANAGEMENT | Facility: OTHER | Age: 85
End: 2025-04-14

## 2025-04-14 VITALS
DIASTOLIC BLOOD PRESSURE: 49 MMHG | RESPIRATION RATE: 18 BRPM | OXYGEN SATURATION: 94 % | BODY MASS INDEX: 40.46 KG/M2 | WEIGHT: 174.82 LBS | HEART RATE: 60 BPM | SYSTOLIC BLOOD PRESSURE: 113 MMHG | HEIGHT: 55 IN | TEMPERATURE: 98.2 F

## 2025-04-14 LAB
ANION GAP SERPL CALCULATED.3IONS-SCNC: 5 MMOL/L (ref 4–13)
BUN SERPL-MCNC: 25 MG/DL (ref 5–25)
CALCIUM SERPL-MCNC: 9.5 MG/DL (ref 8.4–10.2)
CHLORIDE SERPL-SCNC: 102 MMOL/L (ref 96–108)
CO2 SERPL-SCNC: 32 MMOL/L (ref 21–32)
CREAT SERPL-MCNC: 0.79 MG/DL (ref 0.6–1.3)
ERYTHROCYTE [DISTWIDTH] IN BLOOD BY AUTOMATED COUNT: 19.9 % (ref 11.6–15.1)
GFR SERPL CREATININE-BSD FRML MDRD: 68 ML/MIN/1.73SQ M
GLUCOSE P FAST SERPL-MCNC: 90 MG/DL (ref 65–99)
GLUCOSE SERPL-MCNC: 90 MG/DL (ref 65–140)
HCT VFR BLD AUTO: 34.5 % (ref 34.8–46.1)
HGB BLD-MCNC: 10.3 G/DL (ref 11.5–15.4)
INR PPP: 1.5 (ref 0.85–1.19)
MAGNESIUM SERPL-MCNC: 1.9 MG/DL (ref 1.9–2.7)
MCH RBC QN AUTO: 25.8 PG (ref 26.8–34.3)
MCHC RBC AUTO-ENTMCNC: 29.9 G/DL (ref 31.4–37.4)
MCV RBC AUTO: 86 FL (ref 82–98)
PLATELET # BLD AUTO: 452 THOUSANDS/UL (ref 149–390)
PMV BLD AUTO: 9 FL (ref 8.9–12.7)
POTASSIUM SERPL-SCNC: 4.2 MMOL/L (ref 3.5–5.3)
PROTHROMBIN TIME: 18.9 SECONDS (ref 12.3–15)
RBC # BLD AUTO: 4 MILLION/UL (ref 3.81–5.12)
SODIUM SERPL-SCNC: 139 MMOL/L (ref 135–147)
WBC # BLD AUTO: 11.12 THOUSAND/UL (ref 4.31–10.16)

## 2025-04-14 PROCEDURE — 85610 PROTHROMBIN TIME: CPT | Performed by: STUDENT IN AN ORGANIZED HEALTH CARE EDUCATION/TRAINING PROGRAM

## 2025-04-14 PROCEDURE — 99239 HOSP IP/OBS DSCHRG MGMT >30: CPT | Performed by: STUDENT IN AN ORGANIZED HEALTH CARE EDUCATION/TRAINING PROGRAM

## 2025-04-14 PROCEDURE — 85027 COMPLETE CBC AUTOMATED: CPT | Performed by: STUDENT IN AN ORGANIZED HEALTH CARE EDUCATION/TRAINING PROGRAM

## 2025-04-14 PROCEDURE — 83735 ASSAY OF MAGNESIUM: CPT | Performed by: STUDENT IN AN ORGANIZED HEALTH CARE EDUCATION/TRAINING PROGRAM

## 2025-04-14 PROCEDURE — 80048 BASIC METABOLIC PNL TOTAL CA: CPT | Performed by: STUDENT IN AN ORGANIZED HEALTH CARE EDUCATION/TRAINING PROGRAM

## 2025-04-14 RX ADMIN — ASPIRIN 81 MG: 81 TABLET, COATED ORAL at 09:40

## 2025-04-14 RX ADMIN — SERTRALINE HYDROCHLORIDE 100 MG: 100 TABLET ORAL at 09:40

## 2025-04-14 RX ADMIN — OXYBUTYNIN CHLORIDE 5 MG: 5 TABLET, EXTENDED RELEASE ORAL at 09:40

## 2025-04-14 RX ADMIN — NYSTATIN: 100000 POWDER TOPICAL at 09:41

## 2025-04-14 RX ADMIN — PANTOPRAZOLE SODIUM 40 MG: 40 TABLET, DELAYED RELEASE ORAL at 05:36

## 2025-04-14 RX ADMIN — LEVOTHYROXINE SODIUM 50 MCG: 0.05 TABLET ORAL at 05:36

## 2025-04-14 RX ADMIN — PREGABALIN 25 MG: 25 CAPSULE ORAL at 09:40

## 2025-04-14 NOTE — DISCHARGE SUMMARY
Discharge Summary - Hospitalist   Name: Marisol Otoole 84 y.o. female I MRN: [id number]  Unit/Bed#: 2 E 252-01 I Date of Admission: 4/12/2025   Date of Service: 4/14/2025 I Hospital Day: 0     Assessment & Plan  Fall  83 yo male pt with pmhx of paroxymal A.fib currently on Coumadin, GERD, home oxygen dependent for later at baseline, hypothyroidism, hypertension, hyperlipidemia, sick sinus syndrome was recently [facility] on 4[facility]20[facility] and came back again to the hospital after having a fall at home.    Workup currently negative for any acute finding.  PT OT recommended level 2.  Initially patient was having reservation about going to rehab however reports that she lives with a friend and I had discussed and shared my concern with her at length.  Currently she is agreeable to go to rehab.  Patient is hemodynamically stable for discharge to [facility].  Ambulatory dysfunction  Patient is currently agreeable to go to [facility].  Primary hypertension  Currently heart rate 60, blood pressure 113/49  Controlled.  Currently appears euvolemic.  Continue with low-salt, fluid restricted.  Continue home regimen of Lasix.  Continue atenolol, and Lasix.  Acute on chronic diastolic (congestive) heart failure (HCC)  Continue low-salt, fluid restricted.  Currently appears euvolemic.  Continue Lasix 40 mg daily, atenolol.  Paroxysmal atrial fibrillation (HCC)  Does have history of paroxysmal A-fib, NSVT, SVT and previously was on metoprolol which she stopped taking due to diarrhea.  Metoprolol transition to atenolol by cardiology on recent hospitalization.  INR subtherapeutic 1.50.  Continue taking atenolol 25 mg daily.  Patient reports taking Coumadin 2.5 mg daily.  Recommend to check INR while at rehab.  GERD (gastroesophageal reflux disease)  Continue Protonix.  Acquired hypothyroidism  Continue Synthroid  Hyperlipidemia  Continue alternative pravastatin.     Medical Problems       Resolved Problems   Date Reviewed: 4/13/2025   None       Discharging Physician / Practitioner: Javier Poon MD  PCP: MABEL Hallman  Admission Date:   Admission Orders (From admission, onward)       Ordered        04/14/25 1147  INPATIENT ADMISSION  Once            04/13/25 0125  Place in Observation  Once                          Discharge Date: 04/14/25      Reason for Admission: Mechanical fall at home    Hospital Course:     84-year-old female patient with past medical history of paroxysmal A-fib currently on Coumadin, GERD, home oxygen dependent 4L, hypothyroidism, hypertension, hyperlipidemia, sick sinus syndrome status post pacemaker, history of aortic stenosis status post TAVR, chronic HFpEF, sleep apnea, history of NSVT, SVT, history of recurrent UTIs, recently hospitalized due to palpitation since patient stopped taking metoprolol due to diarrhea and she was transitioned to atenolol and discharged home on 4/12/2025. Came back again on 4/12/2025 to the hospital [facility]'s emergency room due to mechanical fall at home.  Workup negative for any acute finding.  Currently patient appears comfortable not in distress. Currently she denies chest pain, dyspnea, fever, chills, headache, visual disturbances, abdominal pain, any other new comments. Reports feeling well and eager to go home. Reports that she lives with a friend.  Patient initially planned to go to rehab however I had lengthy discussion regarding my concerns of her being home and after lengthy discussion she is agreeable to go to rehab.  Currently she is being transition to [facility].  No other events reported.  Refer to earlier notes for further clarification.          Please see above list of diagnoses and related plan for additional information.     Condition at Discharge: good    Discharge Day Visit / Exam:   Subjective: Appears comfortable not in distress.  She denies chest pain, dyspnea, fever, chills, nausea, vomiting, any other complaints.   "Initially declined to go to rehab however currently agreeable.    Vitals: Blood Pressure: (!) 113/49 (04/14/25 1448)  Pulse: 60 (04/14/25 1448)  Temperature: 98.2 °F (36.8 °C) (04/14/25 1448)  Temp Source: Oral (04/13/25 0718)  Respirations: 18 (04/14/25 1448)  Height: 4' 6\" (137.2 cm) (04/13/25 0218)  Weight - Scale: 79.3 kg (174 lb 13.2 oz) (04/13/25 0218)  SpO2: 94 % (04/14/25 1448)  Physical Exam  Constitutional:       General: She is not in acute distress.     Appearance: Normal appearance. She is obese. She is not ill-appearing, toxic-appearing or diaphoretic.   HENT:      Head: Normocephalic and atraumatic.   Cardiovascular:      Rate and Rhythm: Normal rate.      Pulses: Normal pulses.   Pulmonary:      Effort: Pulmonary effort is normal. No respiratory distress.      Breath sounds: Normal breath sounds. No wheezing.      Comments: O2 saturation 94 to 95% on 3 to 4 L nasal cannula which is her baseline.  Abdominal:      General: Bowel sounds are normal. There is no distension.      Palpations: Abdomen is soft.      Tenderness: There is no abdominal tenderness.   Neurological:      Mental Status: She is alert and oriented to person, place, and time. Mental status is at baseline.   Psychiatric:         Mood and Affect: Mood normal.         Behavior: Behavior normal.          Discussion with Family:  Patient reports that she lives with a friend..     Discharge instructions/Information to patient and family:   See after visit summary for information provided to patient and family.      Provisions for Follow-Up Care:  See after visit summary for information related to follow-up care and any pertinent home health orders.      Mobility at time of Discharge:   Basic Mobility Inpatient Raw Score: 15  JH-HLM Goal: 4: Move to chair/commode  JH-HLM Achieved: 7: Walk 25 feet or more       Disposition:   Other: Going to [facility].    Planned Readmission:     Discharge Medications:  See after visit summary for " reconciled discharge medications provided to patient and/or family.      Administrative Statements   Discharge Statement:  I have spent a total time of 32 minutes in caring for this patient on the day of the visit/encounter. >30 minutes of time was spent on: Diagnostic results, Prognosis, Risks and benefits of tx options, Instructions for management, Patient and family education, Importance of tx compliance, Risk factor reductions, Impressions, Counseling / Coordination of care, Documenting in the medical record, Reviewing / ordering tests, medicine, procedures  , and Communicating with other healthcare professionals .    **Please Note: This note may have been constructed using a voice recognition system**

## 2025-04-14 NOTE — ASSESSMENT & PLAN NOTE
85 yo male pt with pmhx of paroxymal A.amy currently on Coumadin, GERD, home oxygen dependent for later at baseline, hypothyroidism, hypertension, hyperlipidemia, sick sinus syndrome was recently [facility] from [facility] on 4/12/2025 and came back again to the hospital after having a fall at home.    Workup currently negative for any acute finding.  PT OT recommended level 2.  Initially patient was having reservation about going to rehab however reports that she lives with a friend and I had discussed and shared my concern with her at length.  Currently she is agreeable to go to rehab.  Patient is hemodynamically stable for discharge to [facility].

## 2025-04-14 NOTE — PLAN OF CARE
Problem: PAIN - ADULT  Goal: Verbalizes/displays adequate comfort level or baseline comfort level  Description: Interventions:- Encourage patient to monitor pain and request assistance- Assess pain using appropriate pain scale- Administer analgesics based on type and severity of pain and evaluate response- Implement non-pharmacological measures as appropriate and evaluate response- Consider cultural and social influences on pain and pain management- Notify physician/advanced practitioner if interventions unsuccessful or patient reports new pain  Outcome: Progressing     Problem: SAFETY ADULT  Goal: Patient will remain free of falls  Description: INTERVENTIONS:- Educate patient/family on patient safety including physical limitations- Instruct patient to call for assistance with activity - Consult OT/PT to assist with strengthening/mobility - Keep Call bell within reach- Keep bed low and locked with side rails adjusted as appropriate- Keep care items and personal belongings within reach- Initiate and maintain comfort rounds- Make Fall Risk Sign visible to staff- Offer Toileting every  Hours, in advance of need- Initiate/Maintain alarm- Obtain necessary fall risk management equipment- Apply yellow socks and bracelet for high fall risk patients- Consider moving patient to room near nurses station  Outcome: Progressing     Problem: Prexisting or High Potential for Compromised Skin Integrity  Goal: Skin integrity is maintained or improved  Description: INTERVENTIONS:- Identify patients at risk for skin breakdown- Assess and monitor skin integrity- Assess and monitor nutrition and hydration status- Monitor labs - Assess for incontinence - Turn and reposition patient- Assist with mobility/ambulation- Relieve pressure over bony prominences- Avoid friction and shearing- Provide appropriate hygiene as needed including keeping skin clean and dry- Evaluate need for skin moisturizer/barrier cream- Collaborate with  interdisciplinary team - Patient/family teaching- Consider wound care consult   Outcome: Progressing     Problem: MUSCULOSKELETAL - ADULT  Goal: Maintain or return mobility to safest level of function  Description: INTERVENTIONS:- Assess patient's ability to carry out ADLs; assess patient's baseline for ADL function and identify physical deficits which impact ability to perform ADLs (bathing, care of mouth/teeth, toileting, grooming, dressing, etc.)- Assess/evaluate cause of self-care deficits - Assess range of motion- Assess patient's mobility- Assess patient's need for assistive devices and provide as appropriate- Encourage maximum independence but intervene and supervise when necessary- Involve family in performance of ADLs- Assess for home care needs following discharge - Consider OT consult to assist with ADL evaluation and planning for discharge- Provide patient education as appropriate  Outcome: Progressing

## 2025-04-14 NOTE — CASE MANAGEMENT
Case Management Discharge Planning Note    Patient name Marisol Otoole  Location 2 [facility] 252/2 E 252-01 MRN [id number]  : 1940 Date 2025       Current Admission Date: 2025  Current Admission Diagnosis:Fall   Patient Active Problem List    Diagnosis Date Noted Date Diagnosed    SVT (supraventricular tachycardia) ([facility]) 2025     NSVT (nonsustained ventricular tachycardia) ([facility]) 2025     Heart palpitations 04/10/2025     Acute on chronic diastolic (congestive) heart failure ([facility]) 2025     Supratherapeutic INR 2025     SSS (sick sinus syndrome) ([facility]) 2024     S/P placement of cardiac pacemaker 2024     Non-rheumatic aortic stenosis 2024     Nonrheumatic tricuspid valve regurgitation 2024     Presence of permanent cardiac pacemaker 2024     Paroxysmal atrial fibrillation ([facility]) 2024     Junctional bradycardia 2024     Anticoagulant long-term use 2024     Walker as ambulation aid 2024     Ambulatory dysfunction 10/24/2023     Fall 2023     Orbital mass 2023     Radiculopathy, lumbar region 07/10/2021     Acute and chronic respiratory failure with hypoxia ([facility]) 06/10/2021     History of depression 2021     Osteopenia 10/22/2020     Dyspnea 2020     Insomnia 2020     History of transcatheter aortic valve replacement (TAVR) 10/09/2018     Primary osteoarthritis of left shoulder      AVB (atrioventricular block)      Chronic heart failure with preserved ejection fraction (HFpEF) ([facility])      COPD, mild (HCC)      Coronary artery disease involving native coronary artery of native heart without angina pectoris      JANICE on CPAP      Iron deficiency anemia secondary to inadequate dietary iron intake 2018     Morbid obesity due to excess calories ([facility]) 2018     Hyperlipidemia 2017     Primary hypertension 2017     GERD (gastroesophageal reflux disease) 10/29/2017     Acquired  hypothyroidism 10/29/2017     LVH (left ventricular hypertrophy) 09/22/2016     Mitral annular calcification 09/22/2016     Moderate mitral valve stenosis 09/22/2016     Pulmonary hypertension (HCC) 09/22/2016       LOS (days): 0  Geometric Mean LOS (GMLOS) (days):   Days to GMLOS:     OBJECTIVE:            Current admission status: Observation   Preferred Pharmacy:   [id number] [place] [facility] [address] [address]  Phone: [phone] Fax: [phone]    [facility], [place] - [address] [facility]  [address] [facility] [address]  Phone: [phone] Fax: [phone]    Primary Care Provider: MABEL Hallman    Primary Insurance: MEDICARE  Secondary Insurance:     DISCHARGE DETAILS:    Discharge planning discussed with:: Friend/caregiver, Angeles.  Freedom of Choice: Yes  Comments - Freedom of Choice: FOC maintained - CM received call from Angeles stating she will not be home until 6p today and is concerned for patient to be d/c home alone.  CM reviewed with SLIM, will hold patient until CG is at the home.  Patient will need transport.  CG states that patient's emergency button (life alert) isn't working.  Patient was being followed by OP CM.  CM sent message to Tati Lala for follow-up OP.  CM contacted family/caregiver?: Yes  Were Treatment Team discharge recommendations reviewed with patient/caregiver?: Yes  Did patient/caregiver verbalize understanding of patient care needs?: Yes  Were patient/caregiver advised of the risks associated with not following Treatment Team discharge recommendations?: Yes    Contacts  Patient Contacts: Angeles (friend/CG)  Relationship to Patient:: Friend  Contact Method: Phone  Phone Number: [phone]  Reason/Outcome: Continuity of Care, Discharge Planning    Other Referral/Resources/Interventions Provided:  Interventions: HHC, Outpatient   Referral Comments: See FOC.  Programs::  CHF    Treatment Team Recommendation: Short Term Rehab, Home with Home Health Care  Discharge Destination Plan:: Home with Home Health Care  Transport at Discharge : BLS Ambulance

## 2025-04-14 NOTE — ASSESSMENT & PLAN NOTE
Currently heart rate 60, blood pressure 113/49  Controlled.  Currently appears euvolemic.  Continue with low-salt, fluid restricted.  Continue home regimen of Lasix.  Continue atenolol, and Lasix.

## 2025-04-14 NOTE — ASSESSMENT & PLAN NOTE
Continue low-salt, fluid restricted.  Currently appears euvolemic.  Continue Lasix 40 mg daily, atenolol.

## 2025-04-14 NOTE — PROGRESS NOTES
Patient:  MAURISIO ELENA    MRN:  [id number]    Aidin Request ID:  [id number]    Level of care reserved:  Skilled Nursing Facility    Partner Reserved:  [facility], [place] [address] [phone]    Clinical needs requested:    Geography searched:  35 miles around [id number]    Start of Service:    Request sent:  12:36pm EDT on 4/14/2025 by Linda Sifuentes    Partner reserved:  1:35pm EDT on 4/14/2025 by Linda Sifuentes    Choice list shared:  1:34pm EDT on 4/14/2025 by Linda Sifuentes

## 2025-04-14 NOTE — ASSESSMENT & PLAN NOTE
Does have history of paroxysmal A-fib, NSVT, SVT and previously was on metoprolol which she stopped taking due to diarrhea.  Metoprolol transition to atenolol by cardiology on recent hospitalization.  INR subtherapeutic 1.50.  Continue taking atenolol 25 mg daily.  Patient reports taking Coumadin 2.5 mg daily.  Recommend to check INR while at rehab.

## 2025-04-14 NOTE — PLAN OF CARE
Problem: PAIN - ADULT  Goal: Verbalizes/displays adequate comfort level or baseline comfort level  Description: Interventions:- Encourage patient to monitor pain and request assistance- Assess pain using appropriate pain scale- Administer analgesics based on type and severity of pain and evaluate response- Implement non-pharmacological measures as appropriate and evaluate response- Consider cultural and social influences on pain and pain management- Notify physician/advanced practitioner if interventions unsuccessful or patient reports new pain  Outcome: Progressing     Problem: INFECTION - ADULT  Goal: Absence or prevention of progression during hospitalization  Description: INTERVENTIONS:- Assess and monitor for signs and symptoms of infection- Monitor lab/diagnostic results- Monitor all insertion sites, i.e. indwelling lines, tubes, and drains- Monitor endotracheal if appropriate and nasal secretions for changes in amount and color- Wilson appropriate cooling/warming therapies per order- Administer medications as ordered- Instruct and encourage patient and family to use good hand hygiene technique- Identify and instruct in appropriate isolation precautions for identified infection/condition  Outcome: Progressing  Goal: Absence of fever/infection during neutropenic period  Description: INTERVENTIONS:- Monitor WBC  Outcome: Progressing     Problem: SAFETY ADULT  Goal: Patient will remain free of falls  Description: INTERVENTIONS:- Educate patient/family on patient safety including physical limitations- Instruct patient to call for assistance with activity - Consult OT/PT to assist with strengthening/mobility - Keep Call bell within reach- Keep bed low and locked with side rails adjusted as appropriate- Keep care items and personal belongings within reach- Initiate and maintain comfort rounds- Make Fall Risk Sign visible to staff- Offer Toileting every 2 Hours, in advance of need- Initiate/Maintain bed/chair  alarm- Obtain necessary fall risk management equipment: alarm equipment- Apply yellow socks and bracelet for high fall risk patients- Consider moving patient to room near nurses station  Outcome: Progressing  Goal: Maintain or return to baseline ADL function  Description: INTERVENTIONS:-  Assess patient's ability to carry out ADLs; assess patient's baseline for ADL function and identify physical deficits which impact ability to perform ADLs (bathing, care of mouth/teeth, toileting, grooming, dressing, etc.)- Assess/evaluate cause of self-care deficits - Assess range of motion- Assess patient's mobility; develop plan if impaired- Assess patient's need for assistive devices and provide as appropriate- Encourage maximum independence but intervene and supervise when necessary- Involve family in performance of ADLs- Assess for home care needs following discharge - Consider OT consult to assist with ADL evaluation and planning for discharge- Provide patient education as appropriate  Outcome: Progressing  Goal: Maintains/Returns to pre admission functional level  Description: INTERVENTIONS:- Perform AM-PAC 6 Click Basic Mobility/ Daily Activity assessment daily.- Set and communicate daily mobility goal to care team and patient/family/caregiver. - Collaborate with rehabilitation services on mobility goals if consulted- Perform Range of Motion 3 times a day.- Reposition patient every 2 hours.- Dangle patient 3 times a day- Stand patient 3 times a day- Ambulate patient 3 times a day- Out of bed to chair 3 times a day - Out of bed for meals 3 times a day- Out of bed for toileting- Record patient progress and toleration of activity level   Outcome: Progressing     Problem: DISCHARGE PLANNING  Goal: Discharge to home or other facility with appropriate resources  Description: INTERVENTIONS:- Identify barriers to discharge w/patient and caregiver- Arrange for needed discharge resources and transportation as appropriate- Identify  discharge learning needs (meds, wound care, etc.)- Arrange for interpretive services to assist at discharge as needed- Refer to Case Management Department for coordinating discharge planning if the patient needs post-hospital services based on physician/advanced practitioner order or complex needs related to functional status, cognitive ability, or social support system  Outcome: Progressing     Problem: Knowledge Deficit  Goal: Patient/family/caregiver demonstrates understanding of disease process, treatment plan, medications, and discharge instructions  Description: Complete learning assessment and assess knowledge base.Interventions:- Provide teaching at level of understanding- Provide teaching via preferred learning methods  Outcome: Progressing     Problem: MUSCULOSKELETAL - ADULT  Goal: Maintain or return mobility to safest level of function  Description: INTERVENTIONS:- Assess patient's ability to carry out ADLs; assess patient's baseline for ADL function and identify physical deficits which impact ability to perform ADLs (bathing, care of mouth/teeth, toileting, grooming, dressing, etc.)- Assess/evaluate cause of self-care deficits - Assess range of motion- Assess patient's mobility- Assess patient's need for assistive devices and provide as appropriate- Encourage maximum independence but intervene and supervise when necessary- Involve family in performance of ADLs- Assess for home care needs following discharge - Consider OT consult to assist with ADL evaluation and planning for discharge- Provide patient education as appropriate  Outcome: Progressing  Goal: Maintain proper alignment of affected body part  Description: INTERVENTIONS:- Support, maintain and protect limb and body alignment- Provide patient/ family with appropriate education  Outcome: Progressing     Problem: Prexisting or High Potential for Compromised Skin Integrity  Goal: Skin integrity is maintained or improved  Description:  INTERVENTIONS:- Identify patients at risk for skin breakdown- Assess and monitor skin integrity- Assess and monitor nutrition and hydration status- Monitor labs - Assess for incontinence - Turn and reposition patient- Assist with mobility/ambulation- Relieve pressure over bony prominences- Avoid friction and shearing- Provide appropriate hygiene as needed including keeping skin clean and dry- Evaluate need for skin moisturizer/barrier cream- Collaborate with interdisciplinary team - Patient/family teaching- Consider wound care consult   Outcome: Progressing

## 2025-04-14 NOTE — CASE MANAGEMENT
Case Management Discharge Planning Note    Patient name Marisol Otoole  Location 2 [facility] 252/2 E 252-01 MRN [id number]  : 1940 Date 2025       Current Admission Date: 2025  Current Admission Diagnosis:Fall   Patient Active Problem List    Diagnosis Date Noted Date Diagnosed    SVT (supraventricular tachycardia) ([facility]) 2025     NSVT (nonsustained ventricular tachycardia) ([facility]) 2025     Heart palpitations 04/10/2025     Acute on chronic diastolic (congestive) heart failure ([facility]) 2025     Supratherapeutic INR 2025     SSS (sick sinus syndrome) ([facility]) 2024     S/P placement of cardiac pacemaker 2024     Non-rheumatic aortic stenosis 2024     Nonrheumatic tricuspid valve regurgitation 2024     Presence of permanent cardiac pacemaker 2024     Paroxysmal atrial fibrillation ([facility]) 2024     Junctional bradycardia 2024     Anticoagulant long-term use 2024     Walker as ambulation aid 2024     Ambulatory dysfunction 10/24/2023     Fall 2023     Orbital mass 2023     Radiculopathy, lumbar region 07/10/2021     Acute and chronic respiratory failure with hypoxia ([facility]) 06/10/2021     History of depression 2021     Osteopenia 10/22/2020     Dyspnea 2020     Insomnia 2020     History of transcatheter aortic valve replacement (TAVR) 10/09/2018     Primary osteoarthritis of left shoulder      AVB (atrioventricular block)      Chronic heart failure with preserved ejection fraction (HFpEF) ([facility])      COPD, mild (HCC)      Coronary artery disease involving native coronary artery of native heart without angina pectoris      JANICE on CPAP      Iron deficiency anemia secondary to inadequate dietary iron intake 2018     Morbid obesity due to excess calories ([facility]) 2018     Hyperlipidemia 2017     Primary hypertension 2017     GERD (gastroesophageal reflux disease) 10/29/2017     Acquired  hypothyroidism 10/29/2017     LVH (left ventricular hypertrophy) 09/22/2016     Mitral annular calcification 09/22/2016     Moderate mitral valve stenosis 09/22/2016     Pulmonary hypertension (HCC) 09/22/2016       LOS (days): 0  Geometric Mean LOS (GMLOS) (days):   Days to GMLOS:     OBJECTIVE:  Risk of Unplanned Readmission Score: 30.97         Current admission status: Inpatient   Preferred Pharmacy:   [id number] [place] [facility] [address]  [address] [place] [address]  [facility] [place] [address]  Phone: [phone] Fax: [phone]    [facility] [place], [place] - [address] [facility]  [address] [id number]  Phone: [phone] Fax: [phone]    Primary Care Provider: MABEL Hallman    Primary Insurance: MEDICARE  Secondary Insurance:     DISCHARGE DETAILS:    Discharge planning discussed with:: Patient at bedside.  Freedom of Choice: Yes  Comments - Freedom of Choice: FOC maintained - CM reviewed DCP with patient at bedside.  Patient states she wants to d/c home, however, feels that it may be safer for her to go to rehab as her emergency button isn't working and her friend isn't home all the time.  Patient agreeable to blanket referral.  [facility] is preference if she can be on the 2nd floor.  Patient had recent STR stay, unsure how many days she used.  Aware she may be in her copay days.  Reports having no secondary coverage.  CM will continue to follow for planning.  CM contacted family/caregiver?: No- see comments  Were Treatment Team discharge recommendations reviewed with patient/caregiver?: Yes  Did patient/caregiver verbalize understanding of patient care needs?: N/A- going to facility  Were patient/caregiver advised of the risks associated with not following Treatment Team discharge recommendations?: Yes    Other Referral/Resources/Interventions Provided:  Interventions: Short Term Rehab  Referral Comments: See FOC. Per OP CM Tati Lala, new OP CM referral can be  sent. (CHF)    Treatment Team Recommendation: Short Term Rehab, Home with Home Health Care  Discharge Destination Plan:: Home with Home Health Care, Short Term Rehab    @ 1427 - [facility] accepted for STR placement.  Patient is within her 30-day SNF placement window and does not need new 3MN stay for SNF admission.  Per Sabiha @ [facility], patient has 7 days remaining until copays.  Patient aware.  CM updated CG/friend, Angeles, via phone.  BLS transport requested for 1545 p/u.  PCS printed.

## 2025-04-14 NOTE — PROGRESS NOTES
Patient:  MAURISIO ELENA    MRN:  [id number]    Aidin Request ID:  [id number]    Level of care reserved:  Home Health Agency    Partner Reserved:  [facility], [place] [address] [phone]    Clinical needs requested:    Geography searched:  [address]    Start of Service:    Request sent:  10:19am EDT on 4/13/2025 by Michelle Joy    Partner reserved:  9:17am EDT on 4/14/2025 by Linda Sifuentes    Choice list shared:  9:17am EDT on 4/14/2025 by Linda Sifuentes

## 2025-04-15 ENCOUNTER — PATIENT OUTREACH (OUTPATIENT)
Dept: CASE MANAGEMENT | Facility: OTHER | Age: 85
End: 2025-04-15

## 2025-04-15 ENCOUNTER — RA CDI HCC (OUTPATIENT)
Dept: OTHER | Facility: HOSPITAL | Age: 85
End: 2025-04-15

## 2025-04-15 ENCOUNTER — HOSPITAL ENCOUNTER (OUTPATIENT)
Dept: INFUSION CENTER | Facility: CLINIC | Age: 85
Discharge: HOME | End: 2025-04-15

## 2025-04-15 ENCOUNTER — TELEPHONE (OUTPATIENT)
Age: 85
End: 2025-04-15

## 2025-04-15 NOTE — PROGRESS NOTES
Reached out to MABEL Gleason, regarding patient's recent hospitalization and rehab admission. Current treatment plan on hold. Per provider, plan can remain on hold until after patient is discharged from rehab. Will contact patient and let her know updates to treatment plan.

## 2025-04-15 NOTE — TELEPHONE ENCOUNTER
Pt called in reporting she is currently in rehab from a recent fall and is not sure when she will be discharged?    Due to being in rehab, pt canceled upcoming appt: 04/22 - will call back when she is ready to r/s.     For any further information or questions, please contact pt. Thank you!    Marisol: [phone]   
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

## 2025-04-15 NOTE — PROGRESS NOTES
Outpatient Care Management ERIN/SNF Pathway.  Discharged 4/14/25 to [facility].  Email sent to facility to inform them the patient is on the ERIN Pathway and I will be following them during their skilled stay.  This Admin Coordinator will continue to monitor via chart review.

## 2025-04-17 ENCOUNTER — REMOTE DEVICE CLINIC VISIT (OUTPATIENT)
Dept: CARDIOLOGY CLINIC | Facility: CLINIC | Age: 85
End: 2025-04-17
Payer: MEDICARE

## 2025-04-17 ENCOUNTER — RESULTS FOLLOW-UP (OUTPATIENT)
Dept: CARDIOLOGY CLINIC | Facility: CLINIC | Age: 85
End: 2025-04-17

## 2025-04-17 ENCOUNTER — TELEPHONE (OUTPATIENT)
Age: 85
End: 2025-04-17

## 2025-04-17 DIAGNOSIS — Z95.0 PRESENCE OF PERMANENT CARDIAC PACEMAKER: Primary | ICD-10-CM

## 2025-04-17 PROCEDURE — 93294 REM INTERROG EVL PM/LDLS PM: CPT | Performed by: INTERNAL MEDICINE

## 2025-04-17 PROCEDURE — 93296 REM INTERROG EVL PM/IDS: CPT | Performed by: INTERNAL MEDICINE

## 2025-04-17 NOTE — TELEPHONE ENCOUNTER
Pt called stating someone called her earlier in the week to do a check on her pacemaker but she is currently in nursing home and did not have device w/ her.  She now has.  Attempted warm transf to device clinic, s/w Radha Guevara, christal was dropped from call.  Radha Guevara is going to call her back.

## 2025-04-17 NOTE — PROGRESS NOTES
Results for orders placed or performed in visit on 04/17/25   Cardiac EP device report    Narrative    MDT DC PM/ACTIVE SYSTEM IS MRI CONDITIONAL  CARELINK TRANSMISSION: BATTERY VOLTAGE ADEQUATE. (11.6 YRS) AP 99%  <1%. ALL AVAILABLE LEAD PARAMETERS WITHIN NORMAL LIMITS. NO SIGNIFICANT HIGH RATE EPISODES. SINGLE PVC COUNT 2.3/HOUR. NORMAL DEVICE FUNCTION.---MONTOYA

## 2025-04-28 ENCOUNTER — TELEPHONE (OUTPATIENT)
Dept: CARDIOLOGY CLINIC | Facility: CLINIC | Age: 85
End: 2025-04-28

## 2025-04-28 DIAGNOSIS — Z79.01 LONG TERM (CURRENT) USE OF ANTICOAGULANTS: Primary | ICD-10-CM

## 2025-04-28 DIAGNOSIS — I48.0 PAROXYSMAL ATRIAL FIBRILLATION (HCC): ICD-10-CM

## 2025-04-28 NOTE — TELEPHONE ENCOUNTER
Hi,  Requesting a pt/inr home draw to be done at your earliest convenience this week. Please call the pt to set up. Script is in the chart.     Thank you.

## 2025-04-30 ENCOUNTER — PATIENT OUTREACH (OUTPATIENT)
Dept: CASE MANAGEMENT | Facility: OTHER | Age: 85
End: 2025-04-30

## 2025-05-01 ENCOUNTER — ANTICOAG VISIT (OUTPATIENT)
Dept: CARDIOLOGY CLINIC | Facility: CLINIC | Age: 85
End: 2025-05-01

## 2025-05-01 ENCOUNTER — RESULTS FOLLOW-UP (OUTPATIENT)
Dept: CARDIOLOGY CLINIC | Facility: CLINIC | Age: 85
End: 2025-05-01

## 2025-05-01 ENCOUNTER — APPOINTMENT (OUTPATIENT)
Dept: LAB | Facility: HOSPITAL | Age: 85
End: 2025-05-01
Attending: INTERNAL MEDICINE
Payer: MEDICARE

## 2025-05-01 ENCOUNTER — TELEPHONE (OUTPATIENT)
Dept: CARDIOLOGY CLINIC | Facility: CLINIC | Age: 85
End: 2025-05-01

## 2025-05-01 DIAGNOSIS — I48.0 PAROXYSMAL ATRIAL FIBRILLATION (HCC): ICD-10-CM

## 2025-05-01 LAB
INR PPP: 2.95 (ref 0.85–1.19)
PROTHROMBIN TIME: 31.4 SECONDS (ref 12.3–15)

## 2025-05-01 PROCEDURE — 85610 PROTHROMBIN TIME: CPT

## 2025-05-01 NOTE — TELEPHONE ENCOUNTER
Hi,  Requesting a pt/inr home draw to be done around 5/8. Please call the pt to set up. Script is in the chart.     Thank you.

## 2025-05-02 ENCOUNTER — TELEPHONE (OUTPATIENT)
Dept: FAMILY MEDICINE CLINIC | Facility: CLINIC | Age: 85
End: 2025-05-02

## 2025-05-02 ENCOUNTER — PATIENT OUTREACH (OUTPATIENT)
Dept: CASE MANAGEMENT | Facility: OTHER | Age: 85
End: 2025-05-02

## 2025-05-02 NOTE — PROGRESS NOTES
Update received from [facility] the patient discharged 4/26/25 to Home. I updated the care coordination note, removed myself as the responsible staff, added the appropriate responsible staff.      A email was sent to the facility requesting discharge instructions. When Admin Coordinator has received the Discharge paperwork  Admin Coordinator will attach to this encounter.

## 2025-05-02 NOTE — TELEPHONE ENCOUNTER
Patient called in and stated that she just got out of the hospital /Rehab.(TCM) She would like to be seen 05/09/25 but you don't have opening unless you would like to add her to the same day?  Please advise.  Thanks

## 2025-05-05 ENCOUNTER — PATIENT OUTREACH (OUTPATIENT)
Dept: CASE MANAGEMENT | Facility: OTHER | Age: 85
End: 2025-05-05

## 2025-05-05 ENCOUNTER — TELEPHONE (OUTPATIENT)
Age: 85
End: 2025-05-05

## 2025-05-05 NOTE — TELEPHONE ENCOUNTER
Received a call from Georgina LUU with [facility] calling to inform PCP that the patient had a fall on Saturday. An ambulance was called however, the patient declined treatment. Carlie states patient states she fell on her shoulder, no injuries were noted. Patients vitals are stable. Patient is scheduled for an appt with PCP on 5/9     Carlie can be reached at [phone]

## 2025-05-05 NOTE — PROGRESS NOTES
Pt referred for care transitions ERIN/SNF after D/C from [facility] 4/12-4/14 for fall, afib, HTN, Hyperlipidemia, ambulatory dysfunction.   Pt then discharged to [facility] for STR then home on 4/26.   Call placed to pt who reports that she has a roommate who assists her with things and takes her to appts but her roommate is away and will be back on 5/7. Reviewed upcoming appts and pt aware. She also reports that she is independent with medications and uses a pillbox.  Pt has [facility] to [facility] home for in home HH. Will follow through chart review for ERIN as pt declines outreach for care management. Will send CRNP message regarding labs for ERIN/ BMP since pt has f/u with her on 5/9.

## 2025-05-06 DIAGNOSIS — I50.32 CHRONIC HEART FAILURE WITH PRESERVED EJECTION FRACTION (HFPEF) (HCC): ICD-10-CM

## 2025-05-06 DIAGNOSIS — Z09 HOSPITAL DISCHARGE FOLLOW-UP: Primary | ICD-10-CM

## 2025-05-08 ENCOUNTER — APPOINTMENT (OUTPATIENT)
Dept: LAB | Facility: HOSPITAL | Age: 85
End: 2025-05-08
Attending: INTERNAL MEDICINE
Payer: MEDICARE

## 2025-05-08 DIAGNOSIS — I48.0 PAROXYSMAL ATRIAL FIBRILLATION (HCC): ICD-10-CM

## 2025-05-08 DIAGNOSIS — D53.9 NUTRITIONAL ANEMIA, UNSPECIFIED: ICD-10-CM

## 2025-05-08 DIAGNOSIS — D64.9 NORMOCYTIC ANEMIA: ICD-10-CM

## 2025-05-08 DIAGNOSIS — I50.32 CHRONIC HEART FAILURE WITH PRESERVED EJECTION FRACTION (HFPEF) (HCC): ICD-10-CM

## 2025-05-08 DIAGNOSIS — Z09 HOSPITAL DISCHARGE FOLLOW-UP: ICD-10-CM

## 2025-05-08 DIAGNOSIS — D50.8 IRON DEFICIENCY ANEMIA SECONDARY TO INADEQUATE DIETARY IRON INTAKE: ICD-10-CM

## 2025-05-08 LAB
ALBUMIN SERPL BCG-MCNC: 3.7 G/DL (ref 3.5–5)
ALP SERPL-CCNC: 97 U/L (ref 34–104)
ALT SERPL W P-5'-P-CCNC: 19 U/L (ref 7–52)
ANION GAP SERPL CALCULATED.3IONS-SCNC: 10 MMOL/L (ref 4–13)
AST SERPL W P-5'-P-CCNC: 25 U/L (ref 13–39)
BASOPHILS # BLD AUTO: 0.05 THOUSANDS/ÂΜL (ref 0–0.1)
BASOPHILS NFR BLD AUTO: 1 % (ref 0–1)
BILIRUB SERPL-MCNC: 0.39 MG/DL (ref 0.2–1)
BUN SERPL-MCNC: 31 MG/DL (ref 5–25)
CALCIUM SERPL-MCNC: 10.3 MG/DL (ref 8.4–10.2)
CHLORIDE SERPL-SCNC: 104 MMOL/L (ref 96–108)
CO2 SERPL-SCNC: 25 MMOL/L (ref 21–32)
CREAT SERPL-MCNC: 0.98 MG/DL (ref 0.6–1.3)
EOSINOPHIL # BLD AUTO: 0.5 THOUSAND/ÂΜL (ref 0–0.61)
EOSINOPHIL NFR BLD AUTO: 5 % (ref 0–6)
ERYTHROCYTE [DISTWIDTH] IN BLOOD BY AUTOMATED COUNT: 19.1 % (ref 11.6–15.1)
FERRITIN SERPL-MCNC: 271 NG/ML (ref 30–307)
GFR SERPL CREATININE-BSD FRML MDRD: 52 ML/MIN/1.73SQ M
GLUCOSE P FAST SERPL-MCNC: 96 MG/DL (ref 65–99)
HCT VFR BLD AUTO: 35.4 % (ref 34.8–46.1)
HGB BLD-MCNC: 10.4 G/DL (ref 11.5–15.4)
IMM GRANULOCYTES # BLD AUTO: 0.04 THOUSAND/UL (ref 0–0.2)
IMM GRANULOCYTES NFR BLD AUTO: 0 % (ref 0–2)
INR PPP: 2.36 (ref 0.85–1.19)
IRON SATN MFR SERPL: 19 % (ref 15–50)
IRON SERPL-MCNC: 53 UG/DL (ref 50–212)
LYMPHOCYTES # BLD AUTO: 1.82 THOUSANDS/ÂΜL (ref 0.6–4.47)
LYMPHOCYTES NFR BLD AUTO: 19 % (ref 14–44)
MCH RBC QN AUTO: 25.9 PG (ref 26.8–34.3)
MCHC RBC AUTO-ENTMCNC: 29.4 G/DL (ref 31.4–37.4)
MCV RBC AUTO: 88 FL (ref 82–98)
MONOCYTES # BLD AUTO: 0.88 THOUSAND/ÂΜL (ref 0.17–1.22)
MONOCYTES NFR BLD AUTO: 9 % (ref 4–12)
NEUTROPHILS # BLD AUTO: 6.34 THOUSANDS/ÂΜL (ref 1.85–7.62)
NEUTS SEG NFR BLD AUTO: 66 % (ref 43–75)
NRBC BLD AUTO-RTO: 0 /100 WBCS
PLATELET # BLD AUTO: 456 THOUSANDS/UL (ref 149–390)
PMV BLD AUTO: 9.7 FL (ref 8.9–12.7)
POTASSIUM SERPL-SCNC: 3.8 MMOL/L (ref 3.5–5.3)
PROT SERPL-MCNC: 7.3 G/DL (ref 6.4–8.4)
PROTHROMBIN TIME: 26.5 SECONDS (ref 12.3–15)
RBC # BLD AUTO: 4.01 MILLION/UL (ref 3.81–5.12)
RETICS # AUTO: ABNORMAL 10*3/UL (ref 14097–95744)
RETICS # CALC: 2.41 % (ref 0.37–1.87)
SODIUM SERPL-SCNC: 139 MMOL/L (ref 135–147)
TIBC SERPL-MCNC: 280 UG/DL (ref 250–450)
TRANSFERRIN SERPL-MCNC: 200 MG/DL (ref 203–362)
UIBC SERPL-MCNC: 227 UG/DL (ref 155–355)
VIT B12 SERPL-MCNC: 827 PG/ML (ref 180–914)
WBC # BLD AUTO: 9.63 THOUSAND/UL (ref 4.31–10.16)

## 2025-05-08 PROCEDURE — 85610 PROTHROMBIN TIME: CPT

## 2025-05-08 PROCEDURE — 36415 COLL VENOUS BLD VENIPUNCTURE: CPT

## 2025-05-08 PROCEDURE — 82728 ASSAY OF FERRITIN: CPT

## 2025-05-08 PROCEDURE — 85025 COMPLETE CBC W/AUTO DIFF WBC: CPT

## 2025-05-08 PROCEDURE — 85045 AUTOMATED RETICULOCYTE COUNT: CPT

## 2025-05-08 PROCEDURE — 83550 IRON BINDING TEST: CPT

## 2025-05-08 PROCEDURE — 82607 VITAMIN B-12: CPT

## 2025-05-08 PROCEDURE — 80053 COMPREHEN METABOLIC PANEL: CPT

## 2025-05-08 PROCEDURE — 83540 ASSAY OF IRON: CPT

## 2025-05-08 NOTE — PROGRESS NOTES
Pain Medicine Follow-Up Note    Assessment:  1. Radiculopathy, lumbar region    2. Degeneration of intervertebral disc of lumbar region with discogenic back pain and lower extremity pain    3. Neuropathy    4. Glenohumeral arthritis        Plan:    New Medications Ordered This Visit   Medications   • pregabalin (LYRICA) 25 mg capsule     Sig: Take 1 capsule (25 mg total) by mouth 2 (two) times a day     Dispense:  60 capsule     Refill:  0       My impressions and treatment recommendations were discussed in detail with the patient who verbalized understanding and had no further questions.      Patient returns to the office stating that her pain overall is worse due to not being on medications but has a pain score of 8 out of 10 on the verbal numeric pain scale.  In July the patient was initiated on pregabalin 25 mg capsule patient was primarily only using 1 capsule/day so medication lasted until December at that time the patient started requesting refills and she was advised to follow-up in the office unfortunately due to a series of medical complications she was unable to follow-up.  Patient states she has not used this medication in some time now.  Advised the patient that we would restart her on pregabalin 25 mg capsule she was started just by taking it at night for 3 days and then proceed to twice a day dosing if not experience any drowsiness or dizziness.  Patient initially requesting a higher dose advised that we would start her back at 25 and then after 2 to 3 weeks she should contact the office and let us know if she is having any side effects and if it is providing significant pain relief if not then we could then increase the medication to pregabalin 50 mg twice daily.  Patient verbalized understanding.    Patient also expressed limitations due to not being able to drive herself.  Advised the patient that sometimes primary care providers will prescribe this medication should she find it difficult for her  to follow-up at [facility].  Patient is no longer getting injections through us due to them being ineffective, advised the patient that we would continue to titrate pregabalin.  Advised the patient that we could consider opioids however I would try to avoid them due to her having ambulatory difficulties and being on 4 L of oxygen.    [place] Prescription Drug Monitoring Program report was reviewed and was appropriate     Follow-up is planned in 3 months time, however patient to call to report efficacy in 3 weeks or sooner as warranted.  Discharge instructions were provided. I personally saw and examined the patient and I agree with the above discussed plan of care.    History of Present Illness:    Marisol Otoole is a 84 y.o. female who presents to [facility] for interval re-evaluation of the above stated pain complaints. The patient has a past medical and chronic pain history as outlined in the assessment section. She was last seen on 7/16/2020 for.    At today's visit patient states that their pain symptoms are the same with a pain score of 8/10 on the verbal numeric pain scale.  The patient's pain is worse all the time.  The patient's pain is constant in nature.  And the quality of the patient's pain is described as dull-aching.  The patient's pain is located in the bilateral shoulders, mid back, and sometimes down her legs.  Patient states the amount of pain relief she was obtaining from pregabalin when she was using it routinely was enough to make a difference in her life by reducing her pain symptoms by 50%.  Patient states that she did not experience any side effects while using pregabalin.      Other than as stated above, the patient denies any interval changes in medications, medical condition, mental condition, symptoms, or allergies since the last office visit.         Review of Systems:    Review of Systems   Respiratory:  Negative for shortness of breath.     Cardiovascular:  Negative for chest pain.   Gastrointestinal:  Negative for constipation, diarrhea, nausea and vomiting.   Musculoskeletal:  Positive for back pain and gait problem. Negative for arthralgias, joint swelling and myalgias.   Skin:  Negative for rash.   Neurological:  Negative for dizziness, seizures and weakness.   All other systems reviewed and are negative.        Past Medical History:   Diagnosis Date   • Anemia 08/22/2018   • Anxiety    • Arthritis    • AVB (atrioventricular block)     first degree   • Cataract    • CHF (congestive heart failure) ([facility]ly [facility])    • COPD, mild ([facility])    • Coronary artery disease    • Dislocation of right shoulder joint    • Frequent UTI    • GERD (gastroesophageal reflux disease)    • H/O: pneumonia    • Heme positive stool    • Hyperlipidemia    • Hypertension    • Hypothyroidism    • Morbid obesity with BMI of 50.0-59.9, adult ([facility]ly [facility])    • Obesity, morbid ([facility]) 08/22/2018   • JANICE on CPAP    • Pulmonary hypertension ([facility]) 08/22/2018   • Severe aortic stenosis    • Simple goiter    • Skin cyst     within the armpits, right   • Wears glasses        Past Surgical History:   Procedure Laterality Date   • BREAST BIOPSY     • CARDIAC CATHETERIZATION     • CARDIAC ELECTROPHYSIOLOGY PROCEDURE N/A 8/12/2024    Procedure: Cardiac pacer implant;  Surgeon: Clarke Zuluaga MD;  Location:  CARDIAC CATH LAB;  Service: Cardiology   • CARPAL TUNNEL RELEASE Bilateral    • CHOLECYSTECTOMY     • DILATION AND CURETTAGE OF UTERUS     • HYSTEROSCOPY     • MASTOID SURGERY     • MO COLONOSCOPY FLX DX W/COLLJ SPEC WHEN PFRMD N/A 9/6/2018    Procedure: COLONOSCOPY;  Surgeon: Shree Sosa III, MD;  Location: [facility];  Service: Gastroenterology   • MO ECHO TRANSESOPHAG R-T 2D W/PRB IMG ACQUISJ I&R N/A 10/9/2018    Procedure: INTRA-OP TRANSESOPHAGEAL ECHOCARDIOGRAM (GARRISON);  Surgeon: Kushal Camarena DO;  Location:  MAIN OR;  Service: Cardiac Surgery   • MO ESOPHAGOGASTRODUODENOSCOPY TRANSORAL  DIAGNOSTIC N/A 8/31/2018    Procedure: ESOPHAGOGASTRODUODENOSCOPY (EGD);  Surgeon: Shree Sosa III, MD;  Location: [facility];  Service: Gastroenterology   • ME REPLACE AORTIC VALVE OPENFEMORAL ARTERY APPROACH N/A 10/9/2018    Procedure: REPLACEMENT AORTIC VALVE TRANSCATHETER (TAVR) TRANSFEMORAL W/ 23 MM MENDOZA NOE S3 VALVE (ACCESS OF LEFT);  Surgeon: Kushal Camarena DO;  Location: [facility];  Service: Cardiac Surgery   • TOTAL HIP ARTHROPLASTY Left 2007   • TOTAL KNEE ARTHROPLASTY Bilateral        Family History   Problem Relation Age of Onset   • Diabetes Mother    • Stroke Mother    • Cancer Father    • Lung cancer Father    • Diabetes Sister    • Heart disease Sister    • Hypertension Sister    • Coronary artery disease Family    • Diabetes Family    • Hypertension Family    • Cancer Family    • Stroke Family    • Thyroid disease Neg Hx        Social History     Occupational History   • Occupation: retired   Tobacco Use   • Smoking status: Never     Passive exposure: Never   • Smokeless tobacco: Never   Vaping Use   • Vaping status: Never Used   Substance and Sexual Activity   • Alcohol use: Not Currently   • Drug use: Never   • Sexual activity: Never         Current Outpatient Medications:   •  acetaminophen (TYLENOL) 500 mg tablet, Take 500 mg by mouth every 6 (six) hours as needed, Disp: , Rfl:   •  aspirin (ECOTRIN LOW STRENGTH) 81 mg EC tablet, Take 1 tablet (81 mg total) by mouth daily, Disp: 100 tablet, Rfl: 0  •  atenolol (TENORMIN) 25 mg tablet, Take 1 tablet (25 mg total) by mouth daily, Disp: 30 tablet, Rfl: 0  •  b complex vitamins capsule, Take 1 capsule by mouth 2 (two) times a day  , Disp: , Rfl:   •  benzonatate (TESSALON PERLES) 100 mg capsule, Take 1 capsule (100 mg total) by mouth 3 (three) times a day as needed for cough, Disp: , Rfl:   •  Blood Glucose Monitoring Suppl (OneTouch Verio Reflect) w/Device KIT, Check blood sugars once daily. Please substitute with appropriate  alternative as covered by patient's insurance. Dx: E11.65, Disp: 1 kit, Rfl: 0  •  Calcium Carb-Cholecalciferol (CALCIUM 600 + D PO), Take 1 tablet by mouth 2 (two) times a day, Disp: , Rfl:   •  Cranberry 250 MG TABS, Take by mouth, Disp: , Rfl:   •  fluticasone (FLONASE) 50 mcg/act nasal spray, 1 spray into each nostril daily, Disp: 16 g, Rfl: 1  •  furosemide (LASIX) 40 mg tablet, TAKE 1 TABLET BY MOUTH EVERY DAY *NEW PRESCRIPTION REQUEST*, Disp: 90 tablet, Rfl: 1  •  glucose blood (OneTouch Verio) test strip, Check blood sugars once daily. Please substitute with appropriate alternative as covered by patient's insurance. Dx: E11.65, Disp: 100 each, Rfl: 3  •  Lancets (onetouch ultrasoft) lancets, Use in the morning Use as instructed, Disp: 100 each, Rfl: 1  •  levothyroxine 50 mcg tablet, TAKE 1 TABLET BY MOUTH EVERY DAY *NEW PRESCRIPTION REQUEST*, Disp: 30 tablet, Rfl: 10  •  nystatin (MYCOSTATIN) powder, Apply topically 2 (two) times a day, Disp: 60 g, Rfl: 1  •  omeprazole (PriLOSEC) 40 MG capsule, TAKE 1 CAPSULE BY MOUTH TWICE A DAY *NEW PRESCRIPTION REQUEST*, Disp: 60 capsule, Rfl: 10  •  OneTouch Delica Lancets 33G MISC, Check blood sugars once daily. Please substitute with appropriate alternative as covered by patient's insurance. Dx: E11.65, Disp: 100 each, Rfl: 3  •  oxybutynin (DITROPAN-XL) 5 mg 24 hr tablet, TAKE 1 TABLET (5 MG TOTAL) BY MOUTH DAILY. *NEW PRESCRIPTION REQUEST*, Disp: 30 tablet, Rfl: 10  •  pregabalin (LYRICA) 25 mg capsule, Take 1 capsule (25 mg total) by mouth 2 (two) times a day, Disp: 60 capsule, Rfl: 0  •  sertraline (ZOLOFT) 100 mg tablet, TAKE 1 TABLET BY MOUTH EVERY DAY *NEW PRESCRIPTION REQUEST*, Disp: 30 tablet, Rfl: 10  •  simvastatin (ZOCOR) 40 mg tablet, TAKE 1 TABLET BY MOUTH EVERYDAY AT BEDTIME *NEW PRESCRIPTION REQUEST*, Disp: 30 tablet, Rfl: 10  •  warfarin (COUMADIN) 2.5 mg tablet, Take 1 tablet (2.5mg) Mon-Sat. Take 2 tablets (5mg) on Sun or as directed, Disp: 102  "tablet, Rfl: 0    Allergies   Allergen Reactions   • Latex Rash   • Neosporin [Neomycin-Bacitracin Zn-Polymyx] Rash and Other (See Comments)     hives per [facility] order       Physical Exam:    Ht 4' 6\" (1.372 m)   Wt 77.1 kg (170 lb)   BMI 40.99 kg/m²     Constitutional:normal, well developed, well nourished, alert, in no distress and non-toxic and no overt pain behavior. and obese  Eyes:anicteric  HEENT:grossly intact  Neck:supple, symmetric, trachea midline and no masses   Pulmonary: Shortness of breath with exertion, currently using oxygen condenser via nasal cannula at 4 L  Cardiovascular:No edema or pitting edema present  Skin:Normal without rashes or lesions and well hydrated  Psychiatric:Mood and affect appropriate  Neurologic:Cranial Nerves II-XII grossly intact  Musculoskeletal:antalgic and ambulating with a rolling walker      This document was created using speech voice recognition software.   Grammatical errors, random word insertions, pronoun errors, and incomplete sentences are an occasional consequence of this system due to software limitations, ambient noise, and hardware issues.   Any formal questions or concerns about content, text, or information contained within the body of this dictation should be directly addressed to the provider for clarification.  "

## 2025-05-09 ENCOUNTER — ANTICOAG VISIT (OUTPATIENT)
Dept: CARDIOLOGY CLINIC | Facility: CLINIC | Age: 85
End: 2025-05-09

## 2025-05-09 ENCOUNTER — TELEPHONE (OUTPATIENT)
Age: 85
End: 2025-05-09

## 2025-05-09 ENCOUNTER — OFFICE VISIT (OUTPATIENT)
Dept: FAMILY MEDICINE CLINIC | Facility: CLINIC | Age: 85
End: 2025-05-09
Payer: MEDICARE

## 2025-05-09 ENCOUNTER — RESULTS FOLLOW-UP (OUTPATIENT)
Dept: CARDIOLOGY CLINIC | Facility: CLINIC | Age: 85
End: 2025-05-09

## 2025-05-09 VITALS
HEART RATE: 97 BPM | SYSTOLIC BLOOD PRESSURE: 104 MMHG | DIASTOLIC BLOOD PRESSURE: 62 MMHG | WEIGHT: 170 LBS | HEIGHT: 55 IN | BODY MASS INDEX: 39.34 KG/M2 | OXYGEN SATURATION: 93 %

## 2025-05-09 DIAGNOSIS — I48.0 PAROXYSMAL ATRIAL FIBRILLATION (HCC): ICD-10-CM

## 2025-05-09 DIAGNOSIS — I25.10 CORONARY ARTERY DISEASE INVOLVING NATIVE CORONARY ARTERY OF NATIVE HEART WITHOUT ANGINA PECTORIS: ICD-10-CM

## 2025-05-09 DIAGNOSIS — Z09 HOSPITAL DISCHARGE FOLLOW-UP: Primary | ICD-10-CM

## 2025-05-09 DIAGNOSIS — I50.33 ACUTE ON CHRONIC DIASTOLIC (CONGESTIVE) HEART FAILURE (HCC): ICD-10-CM

## 2025-05-09 DIAGNOSIS — E66.01 MORBID OBESITY DUE TO EXCESS CALORIES (HCC): ICD-10-CM

## 2025-05-09 DIAGNOSIS — J44.9 COPD, MILD (HCC): ICD-10-CM

## 2025-05-09 DIAGNOSIS — D50.8 IRON DEFICIENCY ANEMIA SECONDARY TO INADEQUATE DIETARY IRON INTAKE: ICD-10-CM

## 2025-05-09 DIAGNOSIS — I10 PRIMARY HYPERTENSION: ICD-10-CM

## 2025-05-09 DIAGNOSIS — I50.32 CHRONIC HEART FAILURE WITH PRESERVED EJECTION FRACTION (HFPEF) (HCC): ICD-10-CM

## 2025-05-09 DIAGNOSIS — R26.2 AMBULATORY DYSFUNCTION: ICD-10-CM

## 2025-05-09 DIAGNOSIS — J96.21 ACUTE AND CHRONIC RESPIRATORY FAILURE WITH HYPOXIA (HCC): ICD-10-CM

## 2025-05-09 PROCEDURE — 99495 TRANSJ CARE MGMT MOD F2F 14D: CPT | Performed by: NURSE PRACTITIONER

## 2025-05-09 NOTE — PROGRESS NOTES
S/w pt. Advised to continue same dose and retest in two weeks.    Message sent to mobile lab to arrange draw for 5/22

## 2025-05-09 NOTE — TELEPHONE ENCOUNTER
Adrienne, Physical Therapist, [facility], reports she was unable to see patient today for home patient, as patient had another appointment to attend. Adrienne reports PCP may receive a reschedule order for patient. Adrienne hopes to see patient next week.

## 2025-05-09 NOTE — PROGRESS NOTES
Transition of Care Visit:  Name: Marisol Otoole      : 1940      MRN: [id number]  Encounter Provider: MABEL Hallman  Encounter Date: 2025   Encounter department: [facility] [address]    Assessment & Plan  Acute on chronic diastolic (congestive) heart failure (HCC)  Wt Readings from Last 3 Encounters:   25 77.1 kg (170 lb)   25 78.9 kg (174 lb)   25 79.3 kg (174 lb 13.2 oz)         Continue on furosemide daily.  Continue care with cardiology.           Chronic heart failure with preserved ejection fraction (HFpEF) (HCC)  Wt Readings from Last 3 Encounters:   25 77.1 kg (170 lb)   25 78.9 kg (174 lb)   25 79.3 kg (174 lb 13.2 oz)                    Coronary artery disease involving native coronary artery of native heart without angina pectoris  Continue care with cardiology.       Paroxysmal atrial fibrillation (HCC)  Continues on Coumadin and atenolol.  Continue care with cardiology.       Primary hypertension  Blood pressure well-managed.       Acute and chronic respiratory failure with hypoxia (HCC)  Patient is requesting portable oxygen tank.  Continues on 4 L nasal cannula.       COPD, mild (HCC)         Iron deficiency anemia secondary to inadequate dietary iron intake  Advised to start on Vitron-C daily over-the-counter.           Ambulatory dysfunction  Continue care with physical therapy.       Morbid obesity due to excess calories (HCC)           Hospital discharge follow-up              History of Present Illness     Transitional Care Management Review:   Marisol Otoole is a 84 y.o. female here for TCM follow up.     During the TCM phone call patient stated:  TCM Call (since 2025)       None          TCM Call (since 2025)       None          Patient presents for TCM.  Patient was admitted from  to the  after suffering a fall.  She then was released to rehab at [facility].  She is  "feeling very depressed as she is so immobile now.  She is on chronic oxygen at 4 L nasal cannula.  She does have home health with physical therapy and nursing.  She does live with her roommate who does help to take care of her.      Review of Systems   Constitutional:  Negative for chills, diaphoresis and fever.   HENT:  Negative for ear pain and sore throat.    Eyes:  Negative for pain and visual disturbance.   Respiratory:  Negative for cough and shortness of breath.    Cardiovascular:  Negative for chest pain and palpitations.   Gastrointestinal:  Negative for abdominal pain and vomiting.   Genitourinary:  Negative for dysuria and hematuria.   Musculoskeletal:  Positive for arthralgias. Negative for back pain.   Skin:  Negative for color change and rash.   Neurological:  Negative for seizures and syncope.   Psychiatric/Behavioral:  Positive for dysphoric mood.    All other systems reviewed and are negative.    Objective   /62   Pulse 97   Ht 4' 6\" (1.372 m)   Wt 77.1 kg (170 lb)   SpO2 93%   BMI 40.99 kg/m²     Physical Exam  Vitals and nursing note reviewed.   Constitutional:       General: She is not in acute distress.     Appearance: She is well-developed.   HENT:      Head: Normocephalic and atraumatic.   Eyes:      Conjunctiva/sclera: Conjunctivae normal.   Cardiovascular:      Rate and Rhythm: Normal rate and regular rhythm.      Heart sounds: No murmur heard.  Pulmonary:      Effort: Pulmonary effort is normal. No respiratory distress.      Breath sounds: Normal breath sounds.   Abdominal:      Palpations: Abdomen is soft.      Tenderness: There is no abdominal tenderness.   Musculoskeletal:         General: No swelling.      Cervical back: Neck supple.   Skin:     General: Skin is warm and dry.      Capillary Refill: Capillary refill takes less than 2 seconds.   Neurological:      Mental Status: She is alert and oriented to person, place, and time.   Psychiatric:         Mood and Affect: Mood " normal.       Medications have been reviewed by provider in current encounter

## 2025-05-09 NOTE — ASSESSMENT & PLAN NOTE
Wt Readings from Last 3 Encounters:   05/09/25 77.1 kg (170 lb)   05/08/25 78.9 kg (174 lb)   04/13/25 79.3 kg (174 lb 13.2 oz)

## 2025-05-09 NOTE — ASSESSMENT & PLAN NOTE
Wt Readings from Last 3 Encounters:   05/09/25 77.1 kg (170 lb)   05/08/25 78.9 kg (174 lb)   04/13/25 79.3 kg (174 lb 13.2 oz)         Continue on furosemide daily.  Continue care with cardiology.

## 2025-05-12 ENCOUNTER — PATIENT OUTREACH (OUTPATIENT)
Dept: CASE MANAGEMENT | Facility: OTHER | Age: 85
End: 2025-05-12

## 2025-05-12 ENCOUNTER — OFFICE VISIT (OUTPATIENT)
Dept: PAIN MEDICINE | Facility: CLINIC | Age: 85
End: 2025-05-12
Payer: MEDICARE

## 2025-05-12 VITALS — BODY MASS INDEX: 39.34 KG/M2 | HEIGHT: 55 IN | WEIGHT: 170 LBS

## 2025-05-12 DIAGNOSIS — M54.16 RADICULOPATHY, LUMBAR REGION: Primary | ICD-10-CM

## 2025-05-12 DIAGNOSIS — M51.362 DEGENERATION OF INTERVERTEBRAL DISC OF LUMBAR REGION WITH DISCOGENIC BACK PAIN AND LOWER EXTREMITY PAIN: ICD-10-CM

## 2025-05-12 DIAGNOSIS — M19.019 GLENOHUMERAL ARTHRITIS: ICD-10-CM

## 2025-05-12 DIAGNOSIS — G62.9 NEUROPATHY: ICD-10-CM

## 2025-05-12 PROCEDURE — 99214 OFFICE O/P EST MOD 30 MIN: CPT

## 2025-05-12 RX ORDER — PREGABALIN 25 MG/1
25 CAPSULE ORAL 2 TIMES DAILY
Qty: 60 CAPSULE | Refills: 0 | Status: ON HOLD | OUTPATIENT
Start: 2025-05-12

## 2025-05-12 NOTE — PATIENT INSTRUCTIONS
Patient Education     Pregabalin (pre SHAWN a usman)   Brand Names: US Lyrica; Lyrica CR   Brand Names: Aj ACH-Pregabalin; AG-Pregabalin; APO-Pregabalin; Auro-Pregabalin; DOM-Pregabalin; JAMP-Pregabalin; Lyrica; M-Pregabalin; Mar-Pregabalin; MINT-Pregabalin; PARKER-Pregabalin; NRA-Pregabalin; PMS-Pregabalin; VANE-Pregabalin; SANDOZ Pregabalin; TARO-Pregabalin; TEVA-Pregabalin   What is this drug used for?   It is used to help control certain kinds of seizures.  It is used to treat painful nerve diseases.  It is used to treat fibromyalgia.  It may be given to you for other reasons. Talk with the doctor.  What do I need to tell my doctor BEFORE I take this drug?   If you are allergic to this drug; any part of this drug; or any other drugs, foods, or substances. Tell your doctor about the allergy and what signs you had.  If you have kidney disease.  If you are breast-feeding. Do not breast-feed while you take this drug.  This is not a list of all drugs or health problems that interact with this drug.  Tell your doctor and pharmacist about all of your drugs (prescription or OTC, natural products, vitamins) and health problems. You must check to make sure that it is safe for you to take this drug with all of your drugs and health problems. Do not start, stop, or change the dose of any drug without checking with your doctor.  What are some things I need to know or do while I take this drug?   Tell all of your health care providers that you take this drug. This includes your doctors, nurses, pharmacists, and dentists.  Avoid driving and doing other tasks or actions that call for you to be alert or have clear eyesight until you see how this drug affects you.  If seizures are different or worse after starting this drug, talk with the doctor.  Do not stop taking this drug all of a sudden without calling your doctor. You may have a greater risk of side effects. If you need to stop this drug, you will want to slowly stop it as  ordered by your doctor.  Avoid drinking alcohol while taking this drug.  Talk with your doctor before you use marijuana, other forms of cannabis, or prescription or OTC drugs that may slow your actions.  A very bad reaction called angioedema has happened with this drug. Sometimes, this may be life-threatening. Signs may include swelling of the hands, face, lips, eyes, tongue, or throat; trouble breathing; trouble swallowing; or unusual hoarseness. Get medical help right away if you have any of these signs.  Severe breathing problems have happened with this drug in people taking certain other drugs (like opioid pain drugs). This has also happened in people who already have lung or breathing problems. The risk may also be greater in people who are older than 65. Sometimes, breathing problems have been deadly. If you have questions, talk with the doctor.  If you are 65 or older, use this drug with care. You could have more side effects.  Talk with your doctor if you plan to father a child. This drug made male animals less fertile and caused sperm changes. Birth defects also happened in the young of male animals treated with this drug. It is not known if these problems happen in humans.  Tell your doctor if you are pregnant or plan on getting pregnant. You will need to talk about the benefits and risks of using this drug while you are pregnant.  What are some side effects that I need to call my doctor about right away?   WARNING/CAUTION: Even though it may be rare, some people may have very bad and sometimes deadly side effects when taking a drug. Tell your doctor or get medical help right away if you have any of the following signs or symptoms that may be related to a very bad side effect:  Signs of an allergic reaction, like rash; hives; itching; red, swollen, blistered, or peeling skin with or without fever; wheezing; tightness in the chest or throat; trouble breathing, swallowing, or talking; unusual hoarseness; or  swelling of the mouth, face, lips, tongue, or throat.  Change in eyesight.  Muscle pain or weakness.  Change in balance.  Feeling confused.  Shakiness.  Trouble breathing, slow breathing, or shallow breathing.  Blue or gray color of the skin, lips, nail beds, fingers, or toes.  Memory problems or loss.  Shortness of breath, a big weight gain, or swelling in the arms or legs.  Fast or abnormal heartbeat.  Fever, chills, or sore throat.  Skin sores.  Any skin change.  Trouble speaking.  Trouble sleeping.  Trouble walking.  Feeling high (easy laughing and feeling good).  Twitching.  Get medical help right away if you feel very sleepy, very dizzy, or if you pass out. Caregivers or others need to get medical help right away if the patient does not respond, does not answer or react like normal, or will not wake up.  Like other drugs that may be used for seizures, this drug may rarely raise the risk of suicidal thoughts or actions. The risk may be higher in people who have had suicidal thoughts or actions in the past. Call the doctor right away about any new or worse signs like depression; feeling nervous, restless, or grouchy; panic attacks; or other changes in mood or behavior. Call the doctor right away if any suicidal thoughts or actions occur.  Low platelet counts have rarely happened with this drug. This may lead to a higher chance of bleeding. Call your doctor right away if you have any unexplained bruising or bleeding.  What are some other side effects of this drug?   All drugs may cause side effects. However, many people have no side effects or only have minor side effects. Call your doctor or get medical help if any of these side effects or any other side effects bother you or do not go away:  Feeling dizzy, sleepy, tired, or weak.  Weight gain.  Not able to focus.  Headache.  Dry mouth.  Constipation.  Increased appetite.  Upset stomach.  Joint pain.  Nose or throat irritation.  These are not all of the side  effects that may occur. If you have questions about side effects, call your doctor. Call your doctor for medical advice about side effects.  You may report side effects to your national health agency.  You may report side effects to the FDA at 1-361.493.8631. You may also report side effects at https://www.fda.gov/medwatch.  How is this drug best taken?   Use this drug as ordered by your doctor. Read all information given to you. Follow all instructions closely.  Extended-release tablets:   Take after the evening meal if taking once daily.  Swallow whole. Do not chew, break, or crush.  Keep taking this drug as you have been told by your doctor or other health care provider, even if you feel well.  Capsules and oral solution:   Take with or without food.  Keep taking this drug as you have been told by your doctor or other health care provider, even if you feel well.  Oral solution:   Measure liquid doses carefully. Use the measuring device that comes with this drug. If there is none, ask the pharmacist for a device to measure this drug.  What do I do if I miss a dose?   Extended-release tablets:   Take a missed dose just before bedtime after eating a snack or after the next day's morning meal.  If you miss taking the missed dose after the next day's morning meal, skip the missed dose and go back to your normal time.  Do not take 2 doses at the same time or extra doses.  Capsules and oral solution:   Take a missed dose as soon as you think about it.  If it is close to the time for your next dose, skip the missed dose and go back to your normal time.  Do not take 2 doses at the same time or extra doses.  How do I store and/or throw out this drug?   Store in the original container at room temperature.  Store in a dry place. Do not store in a bathroom.  Store this drug in a safe place where children cannot see or reach it, and where other people cannot get to it. A locked box or area may help keep this drug safe. Keep  all drugs away from pets.  Throw away unused or  drugs. Do not flush down a toilet or pour down a drain unless you are told to do so. Check with your pharmacist if you have questions about the best way to throw out drugs. There may be drug take-back programs in your area.  General drug facts   If your symptoms or health problems do not get better or if they become worse, call your doctor.  Do not share your drugs with others and do not take anyone else's drugs.  Some drugs may have another patient information leaflet. If you have any questions about this drug, please talk with your doctor, nurse, pharmacist, or other health care provider.  This drug comes with an extra patient fact sheet called a Medication Guide. Read it with care. Read it again each time this drug is refilled. If you have any questions about this drug, please talk with the doctor, pharmacist, or other health care provider.  If you think there has been an overdose, call your poison control center or get medical care right away. Be ready to tell or show what was taken, how much, and when it happened.  Consumer Information Use and Disclaimer   This generalized information is a limited summary of diagnosis, treatment, and/or medication information. It is not meant to be comprehensive and should be used as a tool to help the user understand and/or assess potential diagnostic and treatment options. It does NOT include all information about conditions, treatments, medications, side effects, or risks that may apply to a specific patient. It is not intended to be medical advice or a substitute for the medical advice, diagnosis, or treatment of a health care provider based on the health care provider's examination and assessment of a patient's specific and unique circumstances. Patients must speak with a health care provider for complete information about their health, medical questions, and treatment options, including any risks or benefits  regarding use of medications. This information does not endorse any treatments or medications as safe, effective, or approved for treating a specific patient. UpToDate, Inc. and its affiliates disclaim any warranty or liability relating to this information or the use thereof. The use of this information is governed by the Terms of Use, available at https://www.[employer].com/en/know/clinical-effectiveness-terms.  Last Reviewed Date   2023-12-21  Copyright   © 2024 UpToDate, Inc. and its affiliates and/or licensors. All rights reserved.

## 2025-05-12 NOTE — PROGRESS NOTES
ERIN episode ends today. Pt had declines outreach therefore closing at this time and removing self from care team.

## 2025-05-20 ENCOUNTER — HOSPITAL ENCOUNTER (INPATIENT)
Facility: HOSPITAL | Age: 85
LOS: 6 days | Discharge: HOME HEALTH | DRG: 871 | End: 2025-05-26
Attending: EMERGENCY MEDICINE | Admitting: FAMILY MEDICINE
Payer: MEDICARE

## 2025-05-20 ENCOUNTER — APPOINTMENT (EMERGENCY)
Dept: RADIOLOGY | Facility: HOSPITAL | Age: 85
DRG: 871 | End: 2025-05-20
Payer: MEDICARE

## 2025-05-20 ENCOUNTER — APPOINTMENT (EMERGENCY)
Dept: CT IMAGING | Facility: HOSPITAL | Age: 85
DRG: 871 | End: 2025-05-20
Payer: MEDICARE
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
## 2025-05-20 DIAGNOSIS — N30.00 ACUTE CYSTITIS WITHOUT HEMATURIA: ICD-10-CM

## 2025-05-20 DIAGNOSIS — R26.2 AMBULATORY DYSFUNCTION: Primary | ICD-10-CM

## 2025-05-20 DIAGNOSIS — N39.0 UTI (URINARY TRACT INFECTION): ICD-10-CM

## 2025-05-20 DIAGNOSIS — N17.9 AKI (ACUTE KIDNEY INJURY) (HCC): ICD-10-CM

## 2025-05-20 DIAGNOSIS — N12 PYELONEPHRITIS: ICD-10-CM

## 2025-05-20 LAB
2HR DELTA HS TROPONIN: 3 NG/L
ABO GROUP BLD: NORMAL
ALBUMIN SERPL BCG-MCNC: 3.4 G/DL (ref 3.5–5)
ALP SERPL-CCNC: 90 U/L (ref 34–104)
ALT SERPL W P-5'-P-CCNC: 20 U/L (ref 7–52)
ANION GAP SERPL CALCULATED.3IONS-SCNC: 7 MMOL/L (ref 4–13)
ANION GAP SERPL CALCULATED.3IONS-SCNC: 9 MMOL/L (ref 4–13)
APTT PPP: 64 SECONDS (ref 23–34)
AST SERPL W P-5'-P-CCNC: 32 U/L (ref 13–39)
ATRIAL RATE: 60 BPM
BACTERIA UR QL AUTO: ABNORMAL /HPF
BASOPHILS # BLD AUTO: 0.03 THOUSANDS/ÂΜL (ref 0–0.1)
BASOPHILS NFR BLD AUTO: 0 % (ref 0–1)
BILIRUB SERPL-MCNC: 0.39 MG/DL (ref 0.2–1)
BILIRUB UR QL STRIP: NEGATIVE
BLD GP AB SCN SERPL QL: NEGATIVE
BNP SERPL-MCNC: 573 PG/ML (ref 0–100)
BUN SERPL-MCNC: 37 MG/DL (ref 5–25)
BUN SERPL-MCNC: 42 MG/DL (ref 5–25)
CALCIUM ALBUM COR SERPL-MCNC: 9.7 MG/DL (ref 8.3–10.1)
CALCIUM SERPL-MCNC: 9.2 MG/DL (ref 8.4–10.2)
CALCIUM SERPL-MCNC: 9.3 MG/DL (ref 8.4–10.2)
CARDIAC TROPONIN I PNL SERPL HS: 14 NG/L (ref ?–50)
CARDIAC TROPONIN I PNL SERPL HS: 17 NG/L (ref ?–50)
CHLORIDE SERPL-SCNC: 105 MMOL/L (ref 96–108)
CHLORIDE SERPL-SCNC: 107 MMOL/L (ref 96–108)
CK SERPL-CCNC: 85 U/L (ref 26–192)
CLARITY UR: ABNORMAL
CO2 SERPL-SCNC: 22 MMOL/L (ref 21–32)
CO2 SERPL-SCNC: 24 MMOL/L (ref 21–32)
COLOR UR: ABNORMAL
CREAT SERPL-MCNC: 1.54 MG/DL (ref 0.6–1.3)
CREAT SERPL-MCNC: 1.77 MG/DL (ref 0.6–1.3)
EOSINOPHIL # BLD AUTO: 0.11 THOUSAND/ÂΜL (ref 0–0.61)
EOSINOPHIL NFR BLD AUTO: 1 % (ref 0–6)
ERYTHROCYTE [DISTWIDTH] IN BLOOD BY AUTOMATED COUNT: 18.9 % (ref 11.6–15.1)
ERYTHROCYTE [DISTWIDTH] IN BLOOD BY AUTOMATED COUNT: 18.9 % (ref 11.6–15.1)
GFR SERPL CREATININE-BSD FRML MDRD: 25 ML/MIN/1.73SQ M
GFR SERPL CREATININE-BSD FRML MDRD: 30 ML/MIN/1.73SQ M
GLUCOSE SERPL-MCNC: 126 MG/DL (ref 65–140)
GLUCOSE SERPL-MCNC: 139 MG/DL (ref 65–140)
GLUCOSE UR STRIP-MCNC: NEGATIVE MG/DL
HCT VFR BLD AUTO: 30.3 % (ref 34.8–46.1)
HCT VFR BLD AUTO: 31.9 % (ref 34.8–46.1)
HGB BLD-MCNC: 9.3 G/DL (ref 11.5–15.4)
HGB BLD-MCNC: 9.5 G/DL (ref 11.5–15.4)
HGB UR QL STRIP.AUTO: ABNORMAL
IMM GRANULOCYTES # BLD AUTO: 0.11 THOUSAND/UL (ref 0–0.2)
IMM GRANULOCYTES NFR BLD AUTO: 1 % (ref 0–2)
INR PPP: 3.33 (ref 0.85–1.19)
INR PPP: 3.42 (ref 0.85–1.19)
KETONES UR STRIP-MCNC: NEGATIVE MG/DL
LACTATE SERPL-SCNC: 0.7 MMOL/L (ref 0.5–2)
LEUKOCYTE ESTERASE UR QL STRIP: ABNORMAL
LYMPHOCYTES # BLD AUTO: 0.8 THOUSANDS/ÂΜL (ref 0.6–4.47)
LYMPHOCYTES NFR BLD AUTO: 6 % (ref 14–44)
MCH RBC QN AUTO: 26.2 PG (ref 26.8–34.3)
MCH RBC QN AUTO: 27 PG (ref 26.8–34.3)
MCHC RBC AUTO-ENTMCNC: 29.8 G/DL (ref 31.4–37.4)
MCHC RBC AUTO-ENTMCNC: 30.7 G/DL (ref 31.4–37.4)
MCV RBC AUTO: 88 FL (ref 82–98)
MCV RBC AUTO: 88 FL (ref 82–98)
MONOCYTES # BLD AUTO: 1.21 THOUSAND/ÂΜL (ref 0.17–1.22)
MONOCYTES NFR BLD AUTO: 10 % (ref 4–12)
NEUTROPHILS # BLD AUTO: 10.39 THOUSANDS/ÂΜL (ref 1.85–7.62)
NEUTS SEG NFR BLD AUTO: 82 % (ref 43–75)
NITRITE UR QL STRIP: NEGATIVE
NON-SQ EPI CELLS URNS QL MICRO: ABNORMAL /HPF
NRBC BLD AUTO-RTO: 0 /100 WBCS
P AXIS: -17 DEGREES
PH UR STRIP.AUTO: 6 [PH]
PLATELET # BLD AUTO: 318 THOUSANDS/UL (ref 149–390)
PLATELET # BLD AUTO: 324 THOUSANDS/UL (ref 149–390)
PMV BLD AUTO: 9.4 FL (ref 8.9–12.7)
PMV BLD AUTO: 9.5 FL (ref 8.9–12.7)
POTASSIUM SERPL-SCNC: 4.5 MMOL/L (ref 3.5–5.3)
POTASSIUM SERPL-SCNC: 4.7 MMOL/L (ref 3.5–5.3)
PR INTERVAL: 136 MS
PROT SERPL-MCNC: 7 G/DL (ref 6.4–8.4)
PROT UR STRIP-MCNC: ABNORMAL MG/DL
PROTHROMBIN TIME: 34.3 SECONDS (ref 12.3–15)
PROTHROMBIN TIME: 35.1 SECONDS (ref 12.3–15)
QRS AXIS: -4 DEGREES
QRSD INTERVAL: 80 MS
QT INTERVAL: 412 MS
QTC INTERVAL: 412 MS
RBC # BLD AUTO: 3.45 MILLION/UL (ref 3.81–5.12)
RBC # BLD AUTO: 3.63 MILLION/UL (ref 3.81–5.12)
RBC #/AREA URNS AUTO: ABNORMAL /HPF
RH BLD: POSITIVE
SODIUM SERPL-SCNC: 136 MMOL/L (ref 135–147)
SODIUM SERPL-SCNC: 138 MMOL/L (ref 135–147)
SP GR UR STRIP.AUTO: 1.03 (ref 1–1.03)
SPECIMEN EXPIRATION DATE: NORMAL
T WAVE AXIS: 28 DEGREES
UROBILINOGEN UR STRIP-ACNC: <2 MG/DL
VENTRICULAR RATE: 60 BPM
WBC # BLD AUTO: 12.65 THOUSAND/UL (ref 4.31–10.16)
WBC # BLD AUTO: 14.18 THOUSAND/UL (ref 4.31–10.16)
WBC #/AREA URNS AUTO: ABNORMAL /HPF
WBC CLUMPS # UR AUTO: PRESENT /UL

## 2025-05-20 PROCEDURE — 87154 CUL TYP ID BLD PTHGN 6+ TRGT: CPT

## 2025-05-20 PROCEDURE — 80053 COMPREHEN METABOLIC PANEL: CPT

## 2025-05-20 PROCEDURE — 94002 VENT MGMT INPAT INIT DAY: CPT

## 2025-05-20 PROCEDURE — 84484 ASSAY OF TROPONIN QUANT: CPT

## 2025-05-20 PROCEDURE — 74177 CT ABD & PELVIS W/CONTRAST: CPT

## 2025-05-20 PROCEDURE — 97110 THERAPEUTIC EXERCISES: CPT

## 2025-05-20 PROCEDURE — 70450 CT HEAD/BRAIN W/O DYE: CPT

## 2025-05-20 PROCEDURE — 86900 BLOOD TYPING SEROLOGIC ABO: CPT

## 2025-05-20 PROCEDURE — 87086 URINE CULTURE/COLONY COUNT: CPT

## 2025-05-20 PROCEDURE — 71045 X-RAY EXAM CHEST 1 VIEW: CPT

## 2025-05-20 PROCEDURE — 83880 ASSAY OF NATRIURETIC PEPTIDE: CPT

## 2025-05-20 PROCEDURE — 87186 SC STD MICRODIL/AGAR DIL: CPT

## 2025-05-20 PROCEDURE — 94760 N-INVAS EAR/PLS OXIMETRY 1: CPT

## 2025-05-20 PROCEDURE — 83605 ASSAY OF LACTIC ACID: CPT

## 2025-05-20 PROCEDURE — 86850 RBC ANTIBODY SCREEN: CPT

## 2025-05-20 PROCEDURE — 87040 BLOOD CULTURE FOR BACTERIA: CPT

## 2025-05-20 PROCEDURE — 86901 BLOOD TYPING SEROLOGIC RH(D): CPT

## 2025-05-20 PROCEDURE — 85610 PROTHROMBIN TIME: CPT | Performed by: FAMILY MEDICINE

## 2025-05-20 PROCEDURE — 80048 BASIC METABOLIC PNL TOTAL CA: CPT | Performed by: FAMILY MEDICINE

## 2025-05-20 PROCEDURE — 85027 COMPLETE CBC AUTOMATED: CPT | Performed by: FAMILY MEDICINE

## 2025-05-20 PROCEDURE — 81001 URINALYSIS AUTO W/SCOPE: CPT

## 2025-05-20 PROCEDURE — 99285 EMERGENCY DEPT VISIT HI MDM: CPT

## 2025-05-20 PROCEDURE — 85610 PROTHROMBIN TIME: CPT

## 2025-05-20 PROCEDURE — 97167 OT EVAL HIGH COMPLEX 60 MIN: CPT

## 2025-05-20 PROCEDURE — 72125 CT NECK SPINE W/O DYE: CPT

## 2025-05-20 PROCEDURE — 36415 COLL VENOUS BLD VENIPUNCTURE: CPT

## 2025-05-20 PROCEDURE — 82550 ASSAY OF CK (CPK): CPT

## 2025-05-20 PROCEDURE — 87077 CULTURE AEROBIC IDENTIFY: CPT

## 2025-05-20 PROCEDURE — 71260 CT THORAX DX C+: CPT

## 2025-05-20 PROCEDURE — 96374 THER/PROPH/DIAG INJ IV PUSH: CPT

## 2025-05-20 PROCEDURE — 93010 ELECTROCARDIOGRAM REPORT: CPT | Performed by: INTERNAL MEDICINE

## 2025-05-20 PROCEDURE — 85730 THROMBOPLASTIN TIME PARTIAL: CPT

## 2025-05-20 PROCEDURE — 85025 COMPLETE CBC W/AUTO DIFF WBC: CPT

## 2025-05-20 PROCEDURE — 93005 ELECTROCARDIOGRAM TRACING: CPT

## 2025-05-20 PROCEDURE — 99222 1ST HOSP IP/OBS MODERATE 55: CPT | Performed by: FAMILY MEDICINE

## 2025-05-20 PROCEDURE — 97163 PT EVAL HIGH COMPLEX 45 MIN: CPT

## 2025-05-20 PROCEDURE — 94664 DEMO&/EVAL PT USE INHALER: CPT

## 2025-05-20 RX ORDER — WARFARIN SODIUM 2.5 MG/1
2.5 TABLET ORAL
Status: DISCONTINUED | OUTPATIENT
Start: 2025-05-21 | End: 2025-05-20

## 2025-05-20 RX ORDER — ACETAMINOPHEN 325 MG/1
650 TABLET ORAL ONCE
Status: COMPLETED | OUTPATIENT
Start: 2025-05-20 | End: 2025-05-20

## 2025-05-20 RX ORDER — PANTOPRAZOLE SODIUM 40 MG/1
40 TABLET, DELAYED RELEASE ORAL
Status: DISCONTINUED | OUTPATIENT
Start: 2025-05-20 | End: 2025-05-26 | Stop reason: HOSPADM

## 2025-05-20 RX ORDER — SODIUM CHLORIDE 9 MG/ML
50 INJECTION, SOLUTION INTRAVENOUS CONTINUOUS
Status: DISPENSED | OUTPATIENT
Start: 2025-05-20 | End: 2025-05-21

## 2025-05-20 RX ORDER — PRAVASTATIN SODIUM 80 MG/1
80 TABLET ORAL
Status: DISCONTINUED | OUTPATIENT
Start: 2025-05-20 | End: 2025-05-26 | Stop reason: HOSPADM

## 2025-05-20 RX ORDER — WARFARIN SODIUM 5 MG/1
5 TABLET ORAL
Status: DISCONTINUED | OUTPATIENT
Start: 2025-05-25 | End: 2025-05-20

## 2025-05-20 RX ORDER — LEVOTHYROXINE SODIUM 50 UG/1
50 TABLET ORAL
Status: DISCONTINUED | OUTPATIENT
Start: 2025-05-20 | End: 2025-05-26 | Stop reason: HOSPADM

## 2025-05-20 RX ORDER — ATENOLOL 25 MG/1
25 TABLET ORAL DAILY
Status: DISCONTINUED | OUTPATIENT
Start: 2025-05-20 | End: 2025-05-22

## 2025-05-20 RX ORDER — ACETAMINOPHEN 325 MG/1
650 TABLET ORAL EVERY 6 HOURS PRN
Status: DISCONTINUED | OUTPATIENT
Start: 2025-05-20 | End: 2025-05-26 | Stop reason: HOSPADM

## 2025-05-20 RX ADMIN — ACETAMINOPHEN 650 MG: 325 TABLET ORAL at 22:17

## 2025-05-20 RX ADMIN — CEFTRIAXONE SODIUM 2000 MG: 10 INJECTION, POWDER, FOR SOLUTION INTRAVENOUS at 16:24

## 2025-05-20 RX ADMIN — SODIUM CHLORIDE 500 ML: 0.9 INJECTION, SOLUTION INTRAVENOUS at 03:41

## 2025-05-20 RX ADMIN — LEVOTHYROXINE SODIUM 50 MCG: 0.05 TABLET ORAL at 06:06

## 2025-05-20 RX ADMIN — CEFTRIAXONE SODIUM 1000 MG: 10 INJECTION, POWDER, FOR SOLUTION INTRAVENOUS at 03:44

## 2025-05-20 RX ADMIN — ATENOLOL 25 MG: 25 TABLET ORAL at 08:26

## 2025-05-20 RX ADMIN — SODIUM CHLORIDE 50 ML/HR: 0.9 INJECTION, SOLUTION INTRAVENOUS at 16:10

## 2025-05-20 RX ADMIN — ACETAMINOPHEN 650 MG: 325 TABLET ORAL at 14:19

## 2025-05-20 RX ADMIN — IOHEXOL 100 ML: 350 INJECTION, SOLUTION INTRAVENOUS at 01:48

## 2025-05-20 RX ADMIN — PRAVASTATIN SODIUM 80 MG: 80 TABLET ORAL at 17:42

## 2025-05-20 RX ADMIN — PANTOPRAZOLE SODIUM 40 MG: 40 TABLET, DELAYED RELEASE ORAL at 06:06

## 2025-05-20 RX ADMIN — ACETAMINOPHEN 650 MG: 325 TABLET, FILM COATED ORAL at 02:04

## 2025-05-20 NOTE — PLAN OF CARE
Problem: Potential for Falls  Goal: Patient will remain free of falls  Description: INTERVENTIONS:  - Educate patient/family on patient safety including physical limitations  - Instruct patient to call for assistance with activity   - Consider consulting OT/PT to assist with strengthening/mobility based on AM PAC & JH-HLM score  - Consult OT/PT to assist with strengthening/mobility   - Keep Call bell within reach  - Keep bed low and locked with side rails adjusted as appropriate  - Keep care items and personal belongings within reach  - Initiate and maintain comfort rounds  - Make Fall Risk Sign visible to staff  - Offer Toileting every 2 Hours, in advance of need  - Initiate/Maintain 2alarm  - Obtain necessary fall risk management equipment: 2  - Apply yellow socks and bracelet for high fall risk patients  - Consider moving patient to room near nurses station  Outcome: Progressing

## 2025-05-20 NOTE — H&P
H&P - Hospitalist   Name: Marisol Otoole 84 y.o. female I MRN: [id number]  Unit/Bed#: 2 E 257-01 I Date of Admission: 5/20/2025   Date of Service: 5/20/2025 I Hospital Day: 0     Assessment & Plan  Acute cystitis without hematuria  Rocephin 1gm IV Daily   Urine Cx pending   Pyelitis  Continue Rocephin  Fall  Fall at home, consult PT OT/case management  ERIN (acute kidney injury) ([facility])  I have held her Lasix,  Continue IV fluids  Chronic heart failure with preserved ejection fraction (HFpEF) ([facility])  Continue atenolol, lasix held due to ERIN    Wt Readings from Last 3 Encounters:   05/12/25 77.1 kg (170 lb)   05/09/25 77.1 kg (170 lb)   05/08/25 78.9 kg (174 lb)     Paroxysmal atrial fibrillation ([facility])  Continue atenolol and Coumadin  INR 3.33  GERD (gastroesophageal reflux disease)  Continue PPI  Acquired hypothyroidism  Continue levothyroxine  Primary hypertension  Continue Tylenol  Hyperlipidemia  Continue statin  S/P placement of cardiac pacemaker  Secondary SSS s/p MDT PPM   COPD, mild (HCC)    JANICE on CPAP  Cpap   Morbid obesity due to excess calories ([facility])  BMI 40.99        Disposition  #1  Rocephin, IV fluids  #2  Repeat labs  #3  PT/OT/case med consultation        VTE Pharmacologic Prophylaxis: VTE Score: 7 High Risk (Score >/= 5) - Pharmacological DVT Prophylaxis Ordered: warfarin (Coumadin). Sequential Compression Devices Ordered.  Code Status: Level 1 - Full Code     Anticipated Length of Stay: Patient will be admitted on an inpatient basis with an anticipated length of stay of greater than 2 midnights secondary to acute kidney injury, cystitis, fall/ambulatory dysfunction.    History of Present Illness   Chief Complaint: Fall    Marisol Otoole is a 84 y.o. female with a PMH of sick sinus syndrome status post pacemaker, chronic heart failure, atrial fibs on Coumadin, hypertension, acid reflux, hypertension lipidemia, JANICE who presents with fall.  She was brought in by EMS, patient reports she was feeling her CPAP  machine and her knees gave out.  Patient was recently admitted and had a rehab stent.    Review of Systems   Constitutional:  Positive for fatigue.   Respiratory: Negative.     Cardiovascular: Negative.    Gastrointestinal: Negative.    Musculoskeletal:  Positive for gait problem.       Historical Information   Past Medical History:   Diagnosis Date    Anemia 08/22/2018    Anxiety     Arthritis     AVB (atrioventricular block)     first degree    Cataract     CHF (congestive heart failure) ([facility])     COPD (chronic obstructive pulmonary disease) ([facility])     COPD, mild ([facility])     Coronary artery disease     Dislocation of right shoulder joint     Frequent UTI     GERD (gastroesophageal reflux disease)     H/O: pneumonia     Heme positive stool     Hyperlipidemia     Hypertension     Hypothyroidism     Morbid obesity with BMI of 50.0-59.9, adult ([facility])     Obesity, morbid ([facility]) 08/22/2018    JANICE on CPAP     Pulmonary hypertension ([facility]) 08/22/2018    Severe aortic stenosis     Simple goiter     Skin cyst     within the armpits, right    Wears glasses      Past Surgical History:   Procedure Laterality Date    BREAST BIOPSY      CARDIAC CATHETERIZATION      CARDIAC ELECTROPHYSIOLOGY PROCEDURE N/A 8/12/2024    Procedure: Cardiac pacer implant;  Surgeon: Clarke Zuluaga MD;  Location:  CARDIAC CATH LAB;  Service: Cardiology    CARPAL TUNNEL RELEASE Bilateral     CHOLECYSTECTOMY      DILATION AND CURETTAGE OF UTERUS      HYSTEROSCOPY      MASTOID SURGERY      MI COLONOSCOPY FLX DX W/COLLJ SPEC WHEN PFRMD N/A 9/6/2018    Procedure: COLONOSCOPY;  Surgeon: Shree Sosa III, MD;  Location: [facility];  Service: Gastroenterology    MI ECHO TRANSESOPHAG R-T 2D W/PRB IMG ACQUISJ I&R N/A 10/9/2018    Procedure: INTRA-OP TRANSESOPHAGEAL ECHOCARDIOGRAM (GARRISON);  Surgeon: Kushal Camarena DO;  Location:  MAIN OR;  Service: Cardiac Surgery    MI ESOPHAGOGASTRODUODENOSCOPY TRANSORAL DIAGNOSTIC N/A 8/31/2018    Procedure:  ESOPHAGOGASTRODUODENOSCOPY (EGD);  Surgeon: Shree Sosa III, MD;  Location: [facility];  Service: Gastroenterology    AR REPLACE AORTIC VALVE OPENFEMORAL ARTERY APPROACH N/A 10/9/2018    Procedure: REPLACEMENT AORTIC VALVE TRANSCATHETER (TAVR) TRANSFEMORAL W/ 23 MM MENDOZA NOE S3 VALVE (ACCESS OF LEFT);  Surgeon: Kushal Camarena DO;  Location: [facility];  Service: Cardiac Surgery    TOTAL HIP ARTHROPLASTY Left 2007    TOTAL KNEE ARTHROPLASTY Bilateral      Social History     Tobacco Use    Smoking status: Never     Passive exposure: Never    Smokeless tobacco: Never   Vaping Use    Vaping status: Never Used   Substance and Sexual Activity    Alcohol use: Not Currently    Drug use: Never    Sexual activity: Never     E-Cigarette/Vaping    E-Cigarette Use Never User      E-Cigarette/Vaping Substances    Nicotine No     THC No     CBD No     Flavoring No     Other No     Unknown No      Family history non-contributory  Social History:  Marital Status: Single   Occupation:   Patient Pre-hospital Living Situation: Home  Patient Pre-hospital Level of Mobility:   Patient Pre-hospital Diet Restrictions:     Meds/Allergies   I have reviewed home medications using recent Epic encounter.  Prior to Admission medications    Medication Sig Start Date End Date Taking? Authorizing Provider   acetaminophen (TYLENOL) 500 mg tablet Take 500 mg by mouth every 6 (six) hours as needed    Historical Provider, MD   aspirin (ECOTRIN LOW STRENGTH) 81 mg EC tablet Take 1 tablet (81 mg total) by mouth daily 10/11/18   Fatou Schmitz PA-C   atenolol (TENORMIN) 25 mg tablet Take 1 tablet (25 mg total) by mouth daily 4/13/25 5/13/25  Estuardo Oliveira MD   b complex vitamins capsule Take 1 capsule by mouth 2 (two) times a day      Historical Provider, MD   benzonatate (TESSALON PERLES) 100 mg capsule Take 1 capsule (100 mg total) by mouth 3 (three) times a day as needed for cough 3/4/25   MABEL De Anda   Blood Glucose Monitoring  Suppl (OneTouch Verio Reflect) w/Device KIT Check blood sugars once daily. Please substitute with appropriate alternative as covered by patient's insurance. Dx: E11.65 6/28/24   MABEL Donahue   Calcium Carb-Cholecalciferol (CALCIUM 600 + D PO) Take 1 tablet by mouth 2 (two) times a day    Historical Provider, MD   Cranberry 250 MG TABS Take by mouth    Historical Provider, MD   fluticasone (FLONASE) 50 mcg/act nasal spray 1 spray into each nostril daily 8/22/24   MABEL Hallman   furosemide (LASIX) 40 mg tablet TAKE 1 TABLET BY MOUTH EVERY DAY *NEW PRESCRIPTION REQUEST* 3/31/25   rBo Esteves MD   glucose blood (OneTouch Verio) test strip Check blood sugars once daily. Please substitute with appropriate alternative as covered by patient's insurance. Dx: E11.65 6/28/24   MABEL Donahue   Lancets (onetouch ultrasoft) lancets Use in the morning Use as instructed 6/28/24   MABEL Donahue   levothyroxine 50 mcg tablet TAKE 1 TABLET BY MOUTH EVERY DAY *NEW PRESCRIPTION REQUEST* 3/29/25   MABEL Hallman   nystatin (MYCOSTATIN) powder Apply topically 2 (two) times a day 4/3/25   MABEL Hallman   omeprazole (PriLOSEC) 40 MG capsule TAKE 1 CAPSULE BY MOUTH TWICE A DAY *NEW PRESCRIPTION REQUEST* 3/29/25   MABEL Hallman   OneTouch Delica Lancets 33G MISC Check blood sugars once daily. Please substitute with appropriate alternative as covered by patient's insurance. Dx: E11.65 6/28/24   MABEL Donahue   oxybutynin (DITROPAN-XL) 5 mg 24 hr tablet TAKE 1 TABLET (5 MG TOTAL) BY MOUTH DAILY. *NEW PRESCRIPTION REQUEST* 3/29/25   MABEL Hallman   pregabalin (LYRICA) 25 mg capsule Take 1 capsule (25 mg total) by mouth 2 (two) times a day 5/12/25   MABEL Oliveira   sertraline (ZOLOFT) 100 mg tablet TAKE 1 TABLET BY MOUTH EVERY DAY *NEW PRESCRIPTION REQUEST* 3/29/25   MABEL Hallman   simvastatin (ZOCOR) 40 mg tablet TAKE 1 TABLET BY MOUTH EVERYDAY AT BEDTIME *NEW PRESCRIPTION  REQUEST* 3/29/25   Diya MABEL Bowie   warfarin (COUMADIN) 2.5 mg tablet Take 1 tablet (2.5mg) Mon-Sat. Take 2 tablets (5mg) on Sun or as directed 4/4/25   Peace Borden PA-C   cyclobenzaprine (FLEXERIL) 5 mg tablet Take 1 tablet (5 mg total) by mouth 2 (two) times a day as needed for muscle spasms 8/30/22 9/5/22  Diya PADMA De La CruzuMABEL   meloxicam (Mobic) 15 mg tablet Take 1 tablet (15 mg total) by mouth daily 8/30/22 9/5/22  Diya E Hetu, CRNP     Allergies   Allergen Reactions    Latex Rash    Neosporin [Neomycin-Bacitracin Zn-Polymyx] Rash and Other (See Comments)     hives per PACC order       Objective :  Temp:  [98.2 °F (36.8 °C)] 98.2 °F (36.8 °C)  HR:  [60] 60  BP: ()/(52-64) 98/54  Resp:  [21-29] 28  SpO2:  [94 %-98 %] 97 %  O2 Device: Nasal cannula  Nasal Cannula O2 Flow Rate (L/min):  [4 L/min] 4 L/min    Physical Exam  Constitutional:       Appearance: Normal appearance. She is normal weight.   HENT:      Head: Normocephalic and atraumatic.      Mouth/Throat:      Mouth: Mucous membranes are moist.      Pharynx: Oropharynx is clear.     Eyes:      Extraocular Movements: Extraocular movements intact.      Conjunctiva/sclera: Conjunctivae normal.       Cardiovascular:      Rate and Rhythm: Normal rate and regular rhythm.      Pulses: Normal pulses.      Heart sounds: Normal heart sounds.   Pulmonary:      Comments: Decreased breath sounds bilaterally  Abdominal:      General: There is no distension.      Palpations: Abdomen is soft.      Tenderness: There is no abdominal tenderness. There is no guarding.     Musculoskeletal:      Right lower leg: No edema.      Left lower leg: No edema.     Skin:     General: Skin is warm and dry.     Neurological:      General: No focal deficit present.      Mental Status: She is alert and oriented to person, place, and time. Mental status is at baseline.     Psychiatric:         Mood and Affect: Mood normal.         Behavior: Behavior normal.         Thought Content:  Thought content normal.              Lab Results: I have reviewed the following results:  Results from last 7 days   Lab Units 05/20/25  0133   WBC Thousand/uL 12.65*   HEMOGLOBIN g/dL 9.3*   HEMATOCRIT % 30.3*   PLATELETS Thousands/uL 318   SEGS PCT % 82*   LYMPHO PCT % 6*   MONO PCT % 10   EOS PCT % 1     Results from last 7 days   Lab Units 05/20/25  0133   SODIUM mmol/L 136   POTASSIUM mmol/L 4.7   CHLORIDE mmol/L 105   CO2 mmol/L 22   BUN mg/dL 42*   CREATININE mg/dL 1.77*   ANION GAP mmol/L 9   CALCIUM mg/dL 9.2   ALBUMIN g/dL 3.4*   TOTAL BILIRUBIN mg/dL 0.39   ALK PHOS U/L 90   ALT U/L 20   AST U/L 32   GLUCOSE RANDOM mg/dL 139     Results from last 7 days   Lab Units 05/20/25  0133   INR  3.33*         Lab Results   Component Value Date    HGBA1C 6.1 (H) 08/18/2023    HGBA1C 5.7 (H) 07/11/2023    HGBA1C 5.9 (H) 03/17/2023     Results from last 7 days   Lab Units 05/20/25  0321   LACTIC ACID mmol/L 0.7       Imaging Results Review: I reviewed radiology reports from this admission including: CT chest, CT abdomen/pelvis, CT head, and CT C-spine.  Other Study Results Review: No additional pertinent studies reviewed.    Administrative Statements     Medical decision making: Moderate  Diagnosis addressed: Acute cystitis, ERIN  Data:   Reviewed  CBC, CMP, trauma scans, BNP  Ordered CBC, BMP,  Reviewed external notes from previous admission, pain management, PCP  Discussion of management with ER provider: Patient with a fall, trauma scans negative, CT scan showing acute cystitis           Risk:  Prescription drug management: IV Rocephin, IV fluids  Discussion for hospitalization with ER provider: Hospitalization for fall, ambulatory dysfunction, ERIN, bladder infection        ** Please Note: This note has been constructed using a voice recognition system. **

## 2025-05-20 NOTE — H&P
[facility]  Post-admission follow up  Name: Marisol Otoole I MRN: [id number]  Unit/Bed#: [address] [place], [place] [address]I Date of Admission: 5/20/2025   Date of Service: 5/20/2025  I Hospital Day: 0      Patient seen and examined.  States she is pretty weak.  No other acute events.  Awaiting blood work from this morning to follow-up with acute renal insufficiency.  Checking an INR as well secondary to supratherapeutic INR.  PT OT evaluation.  Patient also had sepsis criteria present on admission with leukocytosis as well as a tachypnea and acute renal insufficiency the patient did meet sepsis criteria.  Unfortunately cannot give the patient a lot of fluids given history of CHF.  IV fluids started at a low rate.  Tylenol given.  Ceftriaxone 2 g every 12 hours.  Follow-up with cultures

## 2025-05-20 NOTE — PLAN OF CARE
Problem: OCCUPATIONAL THERAPY ADULT  Goal: Performs self-care activities at highest level of function for planned discharge setting.  See evaluation for individualized goals.  Description: Treatment Interventions: ADL retraining, Functional transfer training, UE strengthening/ROM, Endurance training, Patient/family training, Equipment evaluation/education, Compensatory technique education, Continued evaluation, Energy conservation, Activityengagement          See flowsheet documentation for full assessment, interventions and recommendations.   Note: Limitation: Decreased ADL status, Decreased UE strength, Decreased UE ROM, Decreased Safe judgement during ADL, Decreased endurance, Decreased self-care trans, Decreased high-level ADLs  Prognosis: Good  Assessment: Patient is a 84 y.o. female seen for OT evaluation s/p admit to [facility] on 5/20/2025 w/Acute cystitis without hematuria. Commorbidities affecting patient's functional performance at time of assessment include: GERD, acquired hypothyroidism, primary hypertension, hyperlipidemia, morbid obesity, chronic heart failure, mild COPD, obstructive sleep apnea, fall, ERIN, paroxysmal atrial fibrillation, and pyelitis.  Orders placed for OT evaluation and treatment.  Performed at least two patient identifiers during session including name and wristband.  Prior to admission, Patient was independnet with ADLs/ required assistance with IADLs. Patient lives in a one story house with ramped entrance, with a friend. Patient was ambulating with a RW.  Personal factors affecting patient at time of initial evaluation include: limited caregiver support, decreased initiation and engagement, difficulty performing ADLs, and difficulty performing IADLs. Upon evaluation, patient requires minimal  assist for UB ADLs, maximal assist for LB ADLs, transfers and functional ambulation in room and bathroom with minimal  and moderate assist, with the use of Rolling Walker.    Occupational performance is affected by the following deficits: decreased functional use of BUEs, limited active ROM, decreased muscle strength, degenerative arthritic joint changes, dynamic sit/ stand balance deficit with poor standing tolerance time for self care and functional mobility, decreased activity tolerance, increased pain, and postural control and postural alignment deficit, requiring external assistance to complete transitional movements.  Patient to benefit from continued Occupational Therapy treatment while in the hospital to address deficits as defined above and maximize level of functional independence with ADLs and functional mobility. Occupational Performance areas to address include: bathing/ shower, dressing, toilet hygiene, transfer to all surfaces, functional mobility, health maintenance, IADLs: safety procedures, and Leisure Participation. From OT standpoint, recommendation at time of d/c would be Level 2.     Rehab Resource Intensity Level, OT: II (Moderate Resource Intensity)

## 2025-05-20 NOTE — ASSESSMENT & PLAN NOTE
Continue atenolol, lasix held due to ERIN    Wt Readings from Last 3 Encounters:   05/12/25 77.1 kg (170 lb)   05/09/25 77.1 kg (170 lb)   05/08/25 78.9 kg (174 lb)

## 2025-05-20 NOTE — RESPIRATORY THERAPY NOTE
RT Protocol Note  Marisol Otoole 84 y.o. female MRN: [id number]  Unit/Bed#: 2 E 257-01 Encounter: [id number]    Assessment    Principal Problem:    Acute cystitis without hematuria  Active Problems:    GERD (gastroesophageal reflux disease)    Acquired hypothyroidism    Primary hypertension    Hyperlipidemia    Morbid obesity due to excess calories ([facility])    Chronic heart failure with preserved ejection fraction (HFpEF) ([facility])    COPD, mild ([facility])    JANICE on CPAP    Fall    ERIN (acute kidney injury) ([facility])    Paroxysmal atrial fibrillation ([facility])    S/P placement of cardiac pacemaker    Pyelitis      Home Pulmonary Medications:    Home Devices/Therapy: Home O2, BiPAP/CPAP    Past Medical History:   Diagnosis Date    Anemia 08/22/2018    Anxiety     Arthritis     AVB (atrioventricular block)     first degree    Cataract     CHF (congestive heart failure) ([facility])     COPD (chronic obstructive pulmonary disease) ([facility])     COPD, mild ([facility])     Coronary artery disease     Dislocation of right shoulder joint     Frequent UTI     GERD (gastroesophageal reflux disease)     H/O: pneumonia     Heme positive stool     Hyperlipidemia     Hypertension     Hypothyroidism     Morbid obesity with BMI of 50.0-59.9, adult ([facility])     Obesity, morbid ([facility]) 08/22/2018    JANICE on CPAP     Pulmonary hypertension ([facility]) 08/22/2018    Severe aortic stenosis     Simple goiter     Skin cyst     within the armpits, right    Wears glasses      Social History     Socioeconomic History    Marital status: Single     Spouse name: None    Number of children: None    Years of education: None    Highest education level: None   Occupational History    Occupation: retired   Tobacco Use    Smoking status: Never     Passive exposure: Never    Smokeless tobacco: Never   Vaping Use    Vaping status: Never Used   Substance and Sexual Activity    Alcohol use: Not Currently    Drug use: Never    Sexual activity: Never   Other Topics Concern    None   Social History  Narrative    Denied drinking coffee    Denied exercise habits    Most recent tobacco use screenin2018      Do you currently or have you served in the [place] [employer]:   No      Were you activated, into active duty, as a member of the [employer] or as a Reservist:   No          Social Drivers of Health     Financial Resource Strain: Low Risk  (10/24/2023)    Overall Financial Resource Strain (CARDIA)     Difficulty of Paying Living Expenses: Not hard at all   Food Insecurity: No Food Insecurity (2025)    Nursing - Inadequate Food Risk Classification     Worried About Running Out of Food in the Last Year: Never true     Ran Out of Food in the Last Year: Never true     Ran Out of Food in the Last Year: Never true   Transportation Needs: No Transportation Needs (2025)    Nursing - Transportation Risk Classification     Lack of Transportation: Not on file     Lack of Transportation: No   Physical Activity: Not on file   Stress: Not on file   Social Connections: Not on file   Intimate Partner Violence: Unknown (2025)    Nursing IPS     Feels Physically and Emotionally Safe: Not on file     Physically Hurt by Someone: Not on file     Humiliated or Emotionally Abused by Someone: Not on file     Physically Hurt by Someone: No     Hurt or Threatened by Someone: No   Housing Stability: Unknown (2025)    Nursing: Inadequate Housing Risk Classification     Has Housing: Not on file     Worried About Losing Housing: Not on file     Unable to Get Utilities: Not on file     Unable to Pay for Housing in the Last Year: No     Has Housin       Subjective         Objective    Physical Exam:   Assessment Type: Assess only  General Appearance: Awake  Respiratory Pattern: Normal  Chest Assessment: Chest expansion symmetrical  Bilateral Breath Sounds: Diminished  O2 Device: V60    Vitals:  Blood pressure 98/54, pulse 60, temperature 98.2 °F (36.8 °C), temperature source Oral, resp. rate (!) 28, SpO2  97%, not currently breastfeeding.          Imaging and other studies:     O2 Device: V60     Plan    Respiratory Plan: Home Bronchodilator Patient pathway        Resp Comments: pt with hx of copd, uses cpap at home

## 2025-05-20 NOTE — PHYSICAL THERAPY NOTE
Physical Therapy Evaluation     Patient's Name: Marisol Otoole    Admitting Diagnosis  Neck pain [M54.2]  UTI (urinary tract infection) [N39.0]  Pyelonephritis [N12]  Head injury [S09.90XA]  ERIN (acute kidney injury) (HCC) [N17.9]  Fall, initial encounter [W19.XXXA]  Ambulatory dysfunction [R26.2]    Problem List  Problem List[1]  Past Medical History  Past Medical History:   Diagnosis Date    Anemia 08/22/2018    Anxiety     Arthritis     AVB (atrioventricular block)     first degree    Cataract     CHF (congestive heart failure) ([facility])     COPD (chronic obstructive pulmonary disease) ([facility])     COPD, mild ([facility])     Coronary artery disease     Dislocation of right shoulder joint     Frequent UTI     GERD (gastroesophageal reflux disease)     H/O: pneumonia     Heme positive stool     Hyperlipidemia     Hypertension     Hypothyroidism     Morbid obesity with BMI of 50.0-59.9, adult ([facility])     Obesity, morbid ([facility]) 08/22/2018    JANICE on CPAP     Pulmonary hypertension ([facility]) 08/22/2018    Severe aortic stenosis     Simple goiter     Skin cyst     within the armpits, right    Wears glasses      Past Surgical History  Past Surgical History:   Procedure Laterality Date    BREAST BIOPSY      CARDIAC CATHETERIZATION      CARDIAC ELECTROPHYSIOLOGY PROCEDURE N/A 8/12/2024    Procedure: Cardiac pacer implant;  Surgeon: Clarke Zuluaga MD;  Location:  CARDIAC CATH LAB;  Service: Cardiology    CARPAL TUNNEL RELEASE Bilateral     CHOLECYSTECTOMY      DILATION AND CURETTAGE OF UTERUS      HYSTEROSCOPY      MASTOID SURGERY      TX COLONOSCOPY FLX DX W/COLLJ SPEC WHEN PFRMD N/A 9/6/2018    Procedure: COLONOSCOPY;  Surgeon: Shree Sosa III, MD;  Location: [facility];  Service: Gastroenterology    TX ECHO TRANSESOPHAG R-T 2D W/PRB IMG ACQUISJ I&R N/A 10/9/2018    Procedure: INTRA-OP TRANSESOPHAGEAL ECHOCARDIOGRAM (GARRISON);  Surgeon: Kushal Camarena DO;  Location:  MAIN OR;  Service: Cardiac Surgery    TX ESOPHAGOGASTRODUODENOSCOPY  TRANSORAL DIAGNOSTIC N/A 8/31/2018    Procedure: ESOPHAGOGASTRODUODENOSCOPY (EGD);  Surgeon: Shree Sosa III, MD;  Location: [facility];  Service: Gastroenterology    MI REPLACE AORTIC VALVE OPENFEMORAL ARTERY APPROACH N/A 10/9/2018    Procedure: REPLACEMENT AORTIC VALVE TRANSCATHETER (TAVR) TRANSFEMORAL W/ 23 MM MENDOZA NOE S3 VALVE (ACCESS OF LEFT);  Surgeon: Kushal Camarena DO;  Location: [facility];  Service: Cardiac Surgery    TOTAL HIP ARTHROPLASTY Left 2007    TOTAL KNEE ARTHROPLASTY Bilateral       05/20/25 0742   PT Last Visit   PT Visit Date 05/20/25   Note Type   Note type Evaluation and Treatment   Pain Assessment   Pain Assessment Tool 0-10   Pain Score 7   Pain Location/Orientation Orientation: Right;Location: Hip   Pain Onset/Description Onset: Ongoing   Hospital Pain Intervention(s) Repositioned;Ambulation/increased activity   Restrictions/Precautions   Weight Bearing Precautions Per Order No   Braces or Orthoses Other (Comment)  (none reported)   Other Precautions Cognitive;Chair Alarm;Bed Alarm;Fall Risk;Pain   Home Living   Type of Home House   Home Layout One level;Able to live on main level with bedroom/bathroom;Performs ADLs on one level  (No REGINO)   Bathroom Shower/Tub Walk-in shower   Bathroom Toilet Raised   Bathroom Equipment Grab bars in shower;Shower chair   Bathroom Accessibility Accessible   Home Equipment Walker;Cane;Other (Comment)  (home O2)   Additional Comments Pt ambulates with a walker.   Prior Function   Level of [place] Independent with functional mobility;Independent with ADLs;Independent with IADLS   Lives With Friend(s)   Receives Help From Friend(s)   IADLs Independent with meal prep;Independent with medication management;Family/Friend/Other provides transportation   Falls in the last 6 months 1 to 4  (1 fall prior to admission; 2 falls)   Vocational Retired   General   Family/Caregiver Present No   Cognition   Overall Cognitive Status WFL  "  Arousal/Participation Alert   Orientation Level Oriented X4   Memory Decreased recall of recent events   Following Commands Follows multistep commands without difficulty   Comments Pt agreeable to PT.   Subjective   Subjective \"I need to go to the bathroom.\"   RLE Assessment   RLE Assessment X   Strength RLE   RLE Overall Strength 4-/5   LLE Assessment   LLE Assessment X   Strength LLE   LLE Overall Strength 4-/5   Light Touch   RLE Light Touch Grossly intact   LLE Light Touch Grossly intact   Bed Mobility   Supine to Sit 4  Minimal assistance   Additional items Assist x 1;HOB elevated;Bedrails;Increased time required;Verbal cues;LE management   Transfers   Sit to Stand 4  Minimal assistance   Additional items Assist x 1;Increased time required;Verbal cues   Stand to Sit 4  Minimal assistance   Additional items Assist x 1;Armrests;Increased time required;Verbal cues   Ambulation/Elevation   Gait pattern Decreased toe off;Decreased hip extension;Decreased heel strike;Excessively slow;Step to;Short stride;Shuffling;Narrow MIGUEL ÁNGEL   Gait Assistance 4  Minimal assist   Additional items Assist x 1;Verbal cues   Assistive Device Rolling walker   Distance 15 feet x 2 trials  (seated rest break between trials)   Balance   Static Sitting Fair +   Dynamic Sitting Fair   Static Standing Fair -   Dynamic Standing Poor +   Ambulatory Poor +   Endurance Deficit   Endurance Deficit Yes   Endurance Deficit Description decreased activity tolerance; pt was received on 4L O2 via NC   Activity Tolerance   Activity Tolerance Patient tolerated treatment well;Patient limited by fatigue   Medical Staff Made Aware OT Kaylee  (Co-evaluation performed with OT secondary to complex medical condition of patient and regression of functional status from baseline. PT/OT goals were addressed separately.)   Nurse Made Aware JUS Starks   Assessment   Prognosis Good   Problem List Decreased strength;Decreased endurance;Impaired balance;Decreased " mobility;Pain   Assessment Pt is 84 year old female seen for PT evaluation s/p admit to [facility] on 5/20/2025 with Acute cystitis without hematuria. PT consulted to assess pt's functional mobility and discharge needs. Order placed for PT evaluation and treatment, with activity as tolerated order. Comorbidities affecting pt's physical performance at time of assessment include GERD, acquired hypothyroidism, primary hypertension, hyperlipidemia, morbid obesity, chronic heart failure, mild COPD, obstructive sleep apnea, fall, ERIN, paroxysmal atrial fibrillation, and pyelitis. Prior to hospitalization, pt was independent with all functional mobility with a walker. Pt ambulates household and community distances. Pt resides with her friend, in a one level house with no steps to enter. Personal factors affecting pt at time of initial evaluation include ambulating with an assistive device, inability to ambulate household distances, inability to ambulate community distances, inability to navigate level surfaces without external assistance, unable to perform dynamic tasks in the community, limited home support, positive fall history, difficulty performing ADLs, and inability to perform IADLs. Please find objective findings from PT assessment regarding body systems outlined above with impairments and limitations including weakness, impaired balance, decreased endurance, gait deviations, pain, decreased activity tolerance, decreased functional mobility tolerance, and fall risk. The following objective measures were performed on initial evaluation Barthel Index: 40/100, Modified Yancy: 4 (moderate/severe disability), and AM-PAC 6-Clicks: 16/24. Pt's clinical presentation is currently unstable/unpredictable seen in pt's presentation of need for ongoing medical management/monitoring, pt is a fall risk, and pt requires cues and assist for safety with functional mobility. Pt to benefit from continued PT treatment to address  deficits as defined above and maximize pt's level of function and independence with mobility. From a PT standpoint, recommendation at time of discharge would be level 2, moderate resource intensity in order to facilitate return to prior level of function.   Barriers to Discharge Decreased caregiver support;Other (Comment)  (decline in functional mobility)   Goals   STG Expiration Date 05/30/25   Short Term Goal #1 In 10 days: Increase bilateral LE strength 1/2 grade to facilitate independent mobility, Perform all bed mobility tasks modified independent to decrease caregiver burden, Perform all transfers modified independent to improve independence, Ambulate > 150 ft. with RW modified independent w/o LOB and w/ normalized gait pattern 100% of the time, and Increase all balance 1/2 grade to decrease risk for falls   PT Treatment Day 1   Plan   Treatment/Interventions Functional transfer training;LE strengthening/ROM;Therapeutic exercise;Endurance training;Patient/family training;Bed mobility;Gait training;Spoke to nursing;OT   PT Frequency 3-5x/wk   Discharge Recommendation   Rehab Resource Intensity Level, PT II (Moderate Resource Intensity)   AM-PAC Basic Mobility Inpatient   Turning in Flat Bed Without Bedrails 3   Lying on Back to Sitting on Edge of Flat Bed Without Bedrails 3   Moving Bed to Chair 3   Standing Up From Chair Using Arms 3   Walk in Room 3   Climb 3-5 Stairs With Railing 1   Basic Mobility Inpatient Raw Score 16   Basic Mobility Standardized Score 38.32   [facility] Highest Level Of Mobility   -HL Goal 5: Stand one or more mins   -HLM Achieved 7: Walk 25 feet or more   Modified [place] Scale   Modified Yancy Scale 4   Barthel Index   Feeding 5   Bathing 0   Grooming Score 0   Dressing Score 5   Bladder Score 5   Bowels Score 10   Toilet Use Score 5   Transfers (Bed/Chair) Score 10   Mobility (Level Surface) Score 0   Stairs Score 0   Barthel Index Score 40   Additional Treatment Session    Start Time 0805   End Time 0815   Treatment Assessment Pt agreeable to PT treatment session following PT evaluation. Pt performed seated therapeutic exercise as indicated below. Pt required verbal and visual cues for correct technique and form. Pt tolerated therapeutic exercise well. Pt continues to exhibit decreased lower extremity strength, impaired balance, decreased endurance, gait deviations, and decreased functional mobility. PT to continue to recommend level 2, moderate resource intensity. PT to continue to follow and treat as appropriate.   Exercises   Hip Flexion Sitting;20 reps;AROM;Bilateral   Knee AROM Long Arc Quad Sitting;20 reps;AROM;Bilateral   Ankle Pumps Sitting;20 reps;AROM;Bilateral   End of Consult   Patient Position at End of Consult Bedside chair;Bed/Chair alarm activated;All needs within reach  (RN aware)     PT Evaluation Time: 0742-0804    PT Treatment Time: 0536-9310  10 minutes  Aleksandra Noland, PT, DPT         [1]   Patient Active Problem List  Diagnosis    GERD (gastroesophageal reflux disease)    Acquired hypothyroidism    Primary hypertension    Hyperlipidemia    LVH (left ventricular hypertrophy)    Mitral annular calcification    Moderate mitral valve stenosis    Pulmonary hypertension (HCC)    Iron deficiency anemia secondary to inadequate dietary iron intake    Morbid obesity due to excess calories (HCC)    Primary osteoarthritis of left shoulder    AVB (atrioventricular block)    Chronic heart failure with preserved ejection fraction (HFpEF) (HCC)    COPD, mild (HCC)    Coronary artery disease involving native coronary artery of native heart without angina pectoris    JANICE on CPAP    History of transcatheter aortic valve replacement (TAVR)    Insomnia    Dyspnea    Osteopenia    History of depression    Radiculopathy, lumbar region    Orbital mass    Fall    ERIN (acute kidney injury) (HCC)    Ambulatory dysfunction    Walker as ambulation aid    Junctional bradycardia     Anticoagulant long-term use    Paroxysmal atrial fibrillation ([facility])    Presence of permanent cardiac pacemaker    SSS (sick sinus syndrome) ([facility])    S/P placement of cardiac pacemaker    Non-rheumatic aortic stenosis    Nonrheumatic tricuspid valve regurgitation    Supratherapeutic INR    Heart palpitations    SVT (supraventricular tachycardia) ([facility])    NSVT (nonsustained ventricular tachycardia) ([facility])    Acute cystitis without hematuria    Pyelitis

## 2025-05-20 NOTE — ED PROVIDER NOTES
"Time reflects when diagnosis was documented in both MDM as applicable and the Disposition within this note       Time User Action Codes Description Comment    5/20/2025  3:47 AM Tracey Al [R26.2] Ambulatory dysfunction     5/20/2025  3:47 AM Tracey Al [W19.XXXA] Fall, initial encounter     5/20/2025  3:47 AM Tracey Al [N39.0] UTI (urinary tract infection)     5/20/2025  3:47 AM Tracey Al [N17.9] ERIN (acute kidney injury) (HCC)     5/20/2025  3:47 AM Tracey Al [N12] Pyelonephritis           ED Disposition       ED Disposition   Admit    Condition   Stable    Date/Time   Tue May 20, 2025  3:47 AM    Comment   Case was discussed with Dr. Escalante and the patient's admission status was agreed to be Admission Status: inpatient status to the service of Dr. Escalante .               Assessment & Plan       Medical Decision Making  Trauma alert called. VSS. Negative trauma workup. However, concern for UTI and pyelitis per CT, \"Question cystitis with ascending urinary tract infection on the left and concomitant pyelitis. Clinical correlation recommended\". Leukocytosis, 12.65. Creatinine 1.77. Infectious UA. Pending blood cultures. Started IV antibiotics in the ER.  Discussed ER results with patient.  Patient admitted to internal medicine for ambulatory dysfunction, ERIN, and concern for pyelonephritis per CT. Patient understands and consents to admission.    Amount and/or Complexity of Data Reviewed  Labs: ordered. Decision-making details documented in ED Course.  Radiology: ordered.    Risk  OTC drugs.  Prescription drug management.  Decision regarding hospitalization.        ED Course as of 05/20/25 0419   Tue May 20, 2025   0146 WBC(!): 12.65   0146 Hemoglobin(!): 9.3   0209 Creatinine(!): 1.77  ERIN   0333 Leukocytes, UA(!): Large   0344 WBC, UA(!): Innumerable       Medications   sodium chloride 0.9 % bolus 500 mL (500 mL Intravenous New Bag 5/20/25 0341)   atenolol (TENORMIN) tablet " 25 mg (has no administration in time range)   warfarin (COUMADIN) tablet 2.5 mg (has no administration in time range)   levothyroxine tablet 50 mcg (has no administration in time range)   pantoprazole (PROTONIX) EC tablet 40 mg (has no administration in time range)   pravastatin (PRAVACHOL) tablet 80 mg (has no administration in time range)   warfarin (COUMADIN) tablet 5 mg (has no administration in time range)   acetaminophen (TYLENOL) tablet 650 mg (650 mg Oral Given 5/20/25 0204)   iohexol (OMNIPAQUE) 350 MG/ML injection (MULTI-DOSE) 100 mL (100 mL Intravenous Given 5/20/25 0148)   ceftriaxone (ROCEPHIN) 1 g/50 mL in dextrose IVPB (0 mg Intravenous Stopped 5/20/25 0358)       ED Risk Strat Scores   HEART Risk Score      Flowsheet Row Most Recent Value   Heart Score Risk Calculator    History 0 Filed at: 05/20/2025 0248   ECG 0 Filed at: 05/20/2025 0248   Age 2 Filed at: 05/20/2025 0248   Risk Factors 2 Filed at: 05/20/2025 0248   Troponin 1 Filed at: 05/20/2025 0248   HEART Score 5 Filed at: 05/20/2025 0248          HEART Risk Score      Flowsheet Row Most Recent Value   Heart Score Risk Calculator    History 0 Filed at: 05/20/2025 0248   ECG 0 Filed at: 05/20/2025 0248   Age 2 Filed at: 05/20/2025 0248   Risk Factors 2 Filed at: 05/20/2025 0248   Troponin 1 Filed at: 05/20/2025 0248   HEART Score 5 Filed at: 05/20/2025 0248                      No data recorded        SBIRT 20yo+      Flowsheet Row Most Recent Value   Initial Alcohol Screen: US AUDIT-C     1. How often do you have a drink containing alcohol? 0 Filed at: 05/20/2025 0131   2. How many drinks containing alcohol do you have on a typical day you are drinking?  0 Filed at: 05/20/2025 0131   3b. FEMALE Any Age, or MALE 65+: How often do you have 4 or more drinks on one occassion? 0 Filed at: 05/20/2025 0131   Audit-C Score 0 Filed at: 05/20/2025 0131   SHAWN: How many times in the past year have you...    Used an illegal drug or used a prescription  medication for non-medical reasons? Never Filed at: 05/20/2025 0131                            History of Present Illness       Chief Complaint   Patient presents with    Fall     Pt fell this morning due to knee failure; she was brought in by ems, ems states they witnessed her laying supine upon arrival. + head strike +thinners. Pt is c/o neck and head pain.   Hx of heart failure, copd. And she currently has a pacemaker.        Past Medical History:   Diagnosis Date    Anemia 08/22/2018    Anxiety     Arthritis     AVB (atrioventricular block)     first degree    Cataract     CHF (congestive heart failure) ([facility])     COPD (chronic obstructive pulmonary disease) ([facility])     COPD, mild (HCC)     Coronary artery disease     Dislocation of right shoulder joint     Frequent UTI     GERD (gastroesophageal reflux disease)     H/O: pneumonia     Heme positive stool     Hyperlipidemia     Hypertension     Hypothyroidism     Morbid obesity with BMI of 50.0-59.9, adult ([facility])     Obesity, morbid ([facility]) 08/22/2018    JANICE on CPAP     Pulmonary hypertension ([facility]) 08/22/2018    Severe aortic stenosis     Simple goiter     Skin cyst     within the armpits, right    Wears glasses       Past Surgical History:   Procedure Laterality Date    BREAST BIOPSY      CARDIAC CATHETERIZATION      CARDIAC ELECTROPHYSIOLOGY PROCEDURE N/A 8/12/2024    Procedure: Cardiac pacer implant;  Surgeon: Clarke Zuluaga MD;  Location:  CARDIAC CATH LAB;  Service: Cardiology    CARPAL TUNNEL RELEASE Bilateral     CHOLECYSTECTOMY      DILATION AND CURETTAGE OF UTERUS      HYSTEROSCOPY      MASTOID SURGERY      NC COLONOSCOPY FLX DX W/COLLJ SPEC WHEN PFRMD N/A 9/6/2018    Procedure: COLONOSCOPY;  Surgeon: Shree Sosa III, MD;  Location: [facility];  Service: Gastroenterology    NC ECHO TRANSESOPHAG R-T 2D W/PRB IMG ACQUISJ I&R N/A 10/9/2018    Procedure: INTRA-OP TRANSESOPHAGEAL ECHOCARDIOGRAM (GARRISON);  Surgeon: Kushal Camarena DO;  Location:  MAIN OR;   Service: Cardiac Surgery    NE ESOPHAGOGASTRODUODENOSCOPY TRANSORAL DIAGNOSTIC N/A 8/31/2018    Procedure: ESOPHAGOGASTRODUODENOSCOPY (EGD);  Surgeon: Shree Sosa III, MD;  Location: [facility];  Service: Gastroenterology    NE REPLACE AORTIC VALVE OPENFEMORAL ARTERY APPROACH N/A 10/9/2018    Procedure: REPLACEMENT AORTIC VALVE TRANSCATHETER (TAVR) TRANSFEMORAL W/ 23 MM MENDOZA NOE S3 VALVE (ACCESS OF LEFT);  Surgeon: Kushal Camarena DO;  Location: [facility];  Service: Cardiac Surgery    TOTAL HIP ARTHROPLASTY Left 2007    TOTAL KNEE ARTHROPLASTY Bilateral       Family History   Problem Relation Age of Onset    Diabetes Mother     Stroke Mother     Cancer Father     Lung cancer Father     Diabetes Sister     Heart disease Sister     Hypertension Sister     Coronary artery disease Family     Diabetes Family     Hypertension Family     Cancer Family     Stroke Family     Thyroid disease Neg Hx       Social History[1]   E-Cigarette/Vaping    E-Cigarette Use Never User       E-Cigarette/Vaping Substances    Nicotine No     THC No     CBD No     Flavoring No     Other No     Unknown No       I have reviewed and agree with the history as documented.     Patient is an 84-year-old female who presents to the emergency room s/p mechanical fall.  Patient reports she was filling her CPAP machine when her knees gave out.  Denies prodromal symptoms.  Patient reports falling on her bedroom floor.  Reports head strike, denies LOC.  + ASA and Coumadin.  On initial evaluation, reporting headache and neck pain.  Denies any trauma or injury.  Denies any other symptoms.  Wears 4L NC at baseline.    Airway: Patent  Breathing: Bilateral breath sounds throughout lung fields  Circulation: 2+ pulses  Disability: GCS 15  Exposure: Completed       Fall  Associated symptoms: no abdominal pain, no back pain, no chest pain, no headaches, no nausea, no seizures and no vomiting        Review of Systems   Constitutional:  Negative for  chills and fever.   HENT:  Negative for ear pain, sore throat, trouble swallowing and voice change.    Eyes:  Negative for pain and visual disturbance.   Respiratory:  Negative for cough and shortness of breath.    Cardiovascular:  Negative for chest pain and palpitations.   Gastrointestinal:  Negative for abdominal pain, nausea and vomiting.   Genitourinary:  Negative for dysuria, flank pain and hematuria.   Musculoskeletal:  Negative for arthralgias and back pain.   Skin:  Negative for color change and rash.   Neurological:  Negative for seizures, syncope and headaches.   Psychiatric/Behavioral:  Negative for confusion.    All other systems reviewed and are negative.          Objective       ED Triage Vitals   Temperature Pulse Blood Pressure Respirations SpO2 Patient Position - Orthostatic VS   05/20/25 0122 05/20/25 0122 05/20/25 0122 05/20/25 0122 05/20/25 0122 05/20/25 0122   98.2 °F (36.8 °C) 60 157/64 (!) 29 96 % Lying      Temp Source Heart Rate Source BP Location FiO2 (%) Pain Score    05/20/25 0122 05/20/25 0122 05/20/25 0122 -- 05/20/25 0204    Oral Monitor Right arm  6      Vitals      Date and Time Temp Pulse SpO2 Resp BP Pain Score FACES Pain Rating User   05/20/25 0338 -- -- -- -- -- 2 --    05/20/25 0330 -- 60 94 % 28 100/52 -- --    05/20/25 0300 -- 60 97 % 24 124/58 -- --    05/20/25 0230 -- 60 98 % 21 120/55 -- --    05/20/25 0204 -- -- -- -- -- 6 --    05/20/25 0200 -- 60 94 % 24 128/60 -- --    05/20/25 0122 98.2 °F (36.8 °C) 60 96 % 29 157/64 -- -- NN            Physical Exam  Vitals and nursing note reviewed.   Constitutional:       General: She is not in acute distress.     Appearance: She is well-developed. She is obese.   HENT:      Head: Normocephalic and atraumatic.     Eyes:      Conjunctiva/sclera: Conjunctivae normal.       Cardiovascular:      Rate and Rhythm: Normal rate and regular rhythm.      Pulses: Normal pulses.      Heart sounds: No murmur heard.  Pulmonary:       Effort: Pulmonary effort is normal. No respiratory distress.      Breath sounds: Normal breath sounds.   Abdominal:      Palpations: Abdomen is soft.      Tenderness: There is no abdominal tenderness. There is no right CVA tenderness or left CVA tenderness.     Musculoskeletal:         General: No swelling.      Cervical back: Neck supple.     Skin:     General: Skin is warm and dry.      Capillary Refill: Capillary refill takes less than 2 seconds.     Neurological:      General: No focal deficit present.      Mental Status: She is alert and oriented to person, place, and time.     Psychiatric:         Mood and Affect: Mood normal.         Results Reviewed       Procedure Component Value Units Date/Time    HS Troponin I 2hr [[id number]]  (Normal) Collected: 05/20/25 0339    Lab Status: Final result Specimen: Blood from Arm, Right Updated: 05/20/25 0404     hs TnI 2hr 17 ng/L      Delta 2hr hsTnI 3 ng/L     Lactic acid, plasma (w/reflex if result > 2.0) [[id number]]  (Normal) Collected: 05/20/25 0321    Lab Status: Final result Specimen: Blood from Arm, Right Updated: 05/20/25 0344     LACTIC ACID 0.7 mmol/L     Narrative:      Result may be elevated if tourniquet was used during collection.    Urine Microscopic [[id number]]  (Abnormal) Collected: 05/20/25 0323    Lab Status: Final result Specimen: Urine, Straight Cath Updated: 05/20/25 0339     RBC, UA Innumerable /hpf      WBC, UA Innumerable /hpf      Epithelial Cells None Seen /hpf      Bacteria, UA None Seen /hpf      WBC Clumps Present    Urine culture [[id number]] Collected: 05/20/25 0323    Lab Status: In process Specimen: Urine, Straight Cath Updated: 05/20/25 0339    HS Troponin I 4hr [[id number]]     Lab Status: No result Specimen: Blood     UA w Reflex to Microscopic w Reflex to Culture [[id number]]  (Abnormal) Collected: 05/20/25 0323    Lab Status: Final result Specimen: Urine, Straight Cath Updated: 05/20/25 0332     Color, UA Orange     Clarity, UA Extra  Turbid     Specific Gravity, UA 1.031     pH, UA 6.0     Leukocytes, UA Large     Nitrite, UA Negative     Protein,  (2+) mg/dl      Glucose, UA Negative mg/dl      Ketones, UA Negative mg/dl      Urobilinogen, UA <2.0 mg/dl      Bilirubin, UA Negative     Occult Blood, UA Large    Blood culture #1 [[id number]] Collected: 05/20/25 0325    Lab Status: In process Specimen: Blood from Arm, Left Updated: 05/20/25 0329    Blood culture #2 [[id number]] Collected: 05/20/25 0321    Lab Status: In process Specimen: Blood from Arm, Right Updated: 05/20/25 0327    HS Troponin 0hr (reflex protocol) [[id number]]  (Normal) Collected: 05/20/25 0133    Lab Status: Final result Specimen: Blood from Arm, Left Updated: 05/20/25 0204     hs TnI 0hr 14 ng/L     B-Type Natriuretic Peptide(BNP) [[id number]]  (Abnormal) Collected: 05/20/25 0133    Lab Status: Final result Specimen: Blood from Arm, Left Updated: 05/20/25 0202      pg/mL     Comprehensive metabolic panel [[id number]]  (Abnormal) Collected: 05/20/25 0133    Lab Status: Final result Specimen: Blood from Arm, Left Updated: 05/20/25 0158     Sodium 136 mmol/L      Potassium 4.7 mmol/L      Chloride 105 mmol/L      CO2 22 mmol/L      ANION GAP 9 mmol/L      BUN 42 mg/dL      Creatinine 1.77 mg/dL      Glucose 139 mg/dL      Calcium 9.2 mg/dL      Corrected Calcium 9.7 mg/dL      AST 32 U/L      ALT 20 U/L      Alkaline Phosphatase 90 U/L      Total Protein 7.0 g/dL      Albumin 3.4 g/dL      Total Bilirubin 0.39 mg/dL      eGFR 25 ml/min/1.73sq m     Narrative:      National [employer] guidelines for Chronic Kidney Disease (CKD):     Stage 1 with normal or high GFR (GFR > 90 mL/min/1.73 square meters)    Stage 2 Mild CKD (GFR = 60-89 mL/min/1.73 square meters)    Stage 3A Moderate CKD (GFR = 45-59 mL/min/1.73 square meters)    Stage 3B Moderate CKD (GFR = 30-44 mL/min/1.73 square meters)    Stage 4 Severe CKD (GFR = 15-29 mL/min/1.73 square meters)    Stage  5 End Stage CKD (GFR <15 mL/min/1.73 square meters)  Note: GFR calculation is accurate only with a steady state creatinine    CK [[id number]]  (Normal) Collected: 05/20/25 0133    Lab Status: Final result Specimen: Blood from Arm, Left Updated: 05/20/25 0158     Total CK 85 U/L     Protime-INR [[id number]]  (Abnormal) Collected: 05/20/25 0133    Lab Status: Final result Specimen: Blood from Arm, Left Updated: 05/20/25 0156     Protime 34.3 seconds      INR 3.33    Narrative:      INR Therapeutic Range    Indication                                             INR Range      Atrial Fibrillation                                               2.0-3.0  Hypercoagulable State                                    2.0.2.3  Left Ventricular Asist Device                            2.0-3.0  Mechanical Heart Valve                                  -    Aortic(with afib, MI, embolism, HF, LA enlargement,    and/or coagulopathy)                                     2.0-3.0 (2.5-3.5)     Mitral                                                             2.5-3.5  Prosthetic/Bioprosthetic Heart Valve               2.0-3.0  Venous thromboembolism (VTE: VT, PE        2.0-3.0    APTT [[id number]]  (Abnormal) Collected: 05/20/25 0133    Lab Status: Final result Specimen: Blood from Arm, Left Updated: 05/20/25 0156     PTT 64 seconds     CBC and differential [[id number]]  (Abnormal) Collected: 05/20/25 0133    Lab Status: Final result Specimen: Blood from Arm, Left Updated: 05/20/25 0140     WBC 12.65 Thousand/uL      RBC 3.45 Million/uL      Hemoglobin 9.3 g/dL      Hematocrit 30.3 %      MCV 88 fL      MCH 27.0 pg      MCHC 30.7 g/dL      RDW 18.9 %      MPV 9.4 fL      Platelets 318 Thousands/uL      nRBC 0 /100 WBCs      Segmented % 82 %      Immature Grans % 1 %      Lymphocytes % 6 %      Monocytes % 10 %      Eosinophils Relative 1 %      Basophils Relative 0 %      Absolute Neutrophils 10.39 Thousands/µL      Absolute Immature Grans 0.11  Thousand/uL      Absolute Lymphocytes 0.80 Thousands/µL      Absolute Monocytes 1.21 Thousand/µL      Eosinophils Absolute 0.11 Thousand/µL      Basophils Absolute 0.03 Thousands/µL             TRAUMA - CT head wo contrast   Final Interpretation by Jose Guadalupe Little MD (05/20 0229)      No intracranial hemorrhage or calvarial fracture.      Small right temporoparietal scalp contusion.            Workstation performed: [id number]         TRAUMA - CT spine cervical wo contrast   Final Interpretation by Jose Guadalupe Little MD (05/20 0238)      No cervical spine fracture or traumatic malalignment.                  Workstation performed: [id number]         TRAUMA - CT chest abdomen pelvis w contrast   Final Interpretation by Jose Guadalupe Little MD (05/20 0255)      No findings of acute traumatic injury in the chest, abdomen or pelvis.      No displaced fracture identified.      Question cystitis with ascending urinary tract infection on the left and concomitant pyelitis. Clinical correlation recommended.      Additional stable chronic/nonemergent findings as above.         Workstation performed: [id number]         XR Trauma chest portable   Final Interpretation by Dajuan Julien DO (05/20 0255)      No acute cardiopulmonary disease.      Other findings as above. Correlation with pending trauma CTs recommended.      Workstation performed: [id number]             ECG 12 Lead Documentation Only    Date/Time: 5/20/2025 1:26 AM    Performed by: Tracey Al PA-C  Authorized by: Tracey Al PA-C    Indications / Diagnosis:  Trauma  ECG reviewed by me, the ED Provider: yes    Patient location:  ED  Interpretation:     Interpretation: normal    Rate:     ECG rate:  60    ECG rate assessment: normal    Rhythm:     Rhythm: paced    ST segments:     ST segments:  Normal  T waves:     T waves: normal        ED Medication and Procedure Management   Prior to Admission Medications   Prescriptions Last Dose Informant Patient Reported?  Taking?   Blood Glucose Monitoring Suppl (OneTouch Verio Reflect) w/Device KIT  Self No No   Sig: Check blood sugars once daily. Please substitute with appropriate alternative as covered by patient's insurance. Dx: E11.65   Calcium Carb-Cholecalciferol (CALCIUM 600 + D PO)  Self Yes No   Sig: Take 1 tablet by mouth 2 (two) times a day   Cranberry 250 MG TABS  Self Yes No   Sig: Take by mouth   Lancets (onetouch ultrasoft) lancets  Self No No   Sig: Use in the morning Use as instructed   OneTouch Delica Lancets 33G MISC  Self No No   Sig: Check blood sugars once daily. Please substitute with appropriate alternative as covered by patient's insurance. Dx: E11.65   acetaminophen (TYLENOL) 500 mg tablet  Self Yes No   Sig: Take 500 mg by mouth every 6 (six) hours as needed   aspirin (ECOTRIN LOW STRENGTH) 81 mg EC tablet  Self No No   Sig: Take 1 tablet (81 mg total) by mouth daily   atenolol (TENORMIN) 25 mg tablet  Self No No   Sig: Take 1 tablet (25 mg total) by mouth daily   b complex vitamins capsule  Self Yes No   Sig: Take 1 capsule by mouth 2 (two) times a day     benzonatate (TESSALON PERLES) 100 mg capsule  Self No No   Sig: Take 1 capsule (100 mg total) by mouth 3 (three) times a day as needed for cough   fluticasone (FLONASE) 50 mcg/act nasal spray  Self No No   Si spray into each nostril daily   furosemide (LASIX) 40 mg tablet  Self No No   Sig: TAKE 1 TABLET BY MOUTH EVERY DAY *NEW PRESCRIPTION REQUEST*   glucose blood (OneTouch Verio) test strip  Self No No   Sig: Check blood sugars once daily. Please substitute with appropriate alternative as covered by patient's insurance. Dx: E11.65   levothyroxine 50 mcg tablet  Self No No   Sig: TAKE 1 TABLET BY MOUTH EVERY DAY *NEW PRESCRIPTION REQUEST*   nystatin (MYCOSTATIN) powder  Self No No   Sig: Apply topically 2 (two) times a day   omeprazole (PriLOSEC) 40 MG capsule  Self No No   Sig: TAKE 1 CAPSULE BY MOUTH TWICE A DAY *NEW PRESCRIPTION REQUEST*    oxybutynin (DITROPAN-XL) 5 mg 24 hr tablet  Self No No   Sig: TAKE 1 TABLET (5 MG TOTAL) BY MOUTH DAILY. *NEW PRESCRIPTION REQUEST*   pregabalin (LYRICA) 25 mg capsule   No No   Sig: Take 1 capsule (25 mg total) by mouth 2 (two) times a day   sertraline (ZOLOFT) 100 mg tablet  Self No No   Sig: TAKE 1 TABLET BY MOUTH EVERY DAY *NEW PRESCRIPTION REQUEST*   simvastatin (ZOCOR) 40 mg tablet  Self No No   Sig: TAKE 1 TABLET BY MOUTH EVERYDAY AT BEDTIME *NEW PRESCRIPTION REQUEST*   warfarin (COUMADIN) 2.5 mg tablet  Self No No   Sig: Take 1 tablet (2.5mg) Mon-Sat. Take 2 tablets (5mg) on Sun or as directed      Facility-Administered Medications: None     Patient's Medications   Discharge Prescriptions    No medications on file     No discharge procedures on file.  ED SEPSIS DOCUMENTATION   Time reflects when diagnosis was documented in both MDM as applicable and the Disposition within this note       Time User Action Codes Description Comment    5/20/2025  3:47 AM Tracey Al [R26.2] Ambulatory dysfunction     5/20/2025  3:47 AM Tracey Al [W19.XXXA] Fall, initial encounter     5/20/2025  3:47 AM Trcaey Al [N39.0] UTI (urinary tract infection)     5/20/2025  3:47 AM Tracey Al [N17.9] ERIN (acute kidney injury) ([facility])     5/20/2025  3:47 AM Tracey Al [N12] Pyelonephritis                    [1]   Social History  Tobacco Use    Smoking status: Never     Passive exposure: Never    Smokeless tobacco: Never   Vaping Use    Vaping status: Never Used   Substance Use Topics    Alcohol use: Not Currently    Drug use: Never        Tracey Al PA-C  05/20/25 0419

## 2025-05-20 NOTE — PLAN OF CARE
Problem: Potential for Falls  Goal: Patient will remain free of falls  Description: INTERVENTIONS:  - Educate patient/family on patient safety including physical limitations  - Instruct patient to call for assistance with activity   - Consider consulting OT/PT to assist with strengthening/mobility based on AM PAC & JH-HLM score  - Consult OT/PT to assist with strengthening/mobility   - Keep Call bell within reach  - Keep bed low and locked with side rails adjusted as appropriate  - Keep care items and personal belongings within reach  - Initiate and maintain comfort rounds  - Make Fall Risk Sign visible to staff  - Initiate/Maintain alarm  - Obtain necessary fall risk management equipment:   - Apply yellow socks and bracelet for high fall risk patients  - Consider moving patient to room near nurses station  Outcome: Progressing

## 2025-05-20 NOTE — PLAN OF CARE
Problem: PHYSICAL THERAPY ADULT  Goal: Performs mobility at highest level of function for planned discharge setting.  See evaluation for individualized goals.  Description: Treatment/Interventions: Functional transfer training, LE strengthening/ROM, Therapeutic exercise, Endurance training, Patient/family training, Bed mobility, Gait training, Spoke to nursing, OT          See flowsheet documentation for full assessment, interventions and recommendations.  Note: Prognosis: Good  Problem List: Decreased strength, Decreased endurance, Impaired balance, Decreased mobility, Pain  Assessment: Pt is 84 year old female seen for PT evaluation s/p admit to [facility] on 5/20/2025 with Acute cystitis without hematuria. PT consulted to assess pt's functional mobility and discharge needs. Order placed for PT evaluation and treatment, with activity as tolerated order. Comorbidities affecting pt's physical performance at time of assessment include GERD, acquired hypothyroidism, primary hypertension, hyperlipidemia, morbid obesity, chronic heart failure, mild COPD, obstructive sleep apnea, fall, ERIN, paroxysmal atrial fibrillation, and pyelitis. Prior to hospitalization, pt was independent with all functional mobility with a walker. Pt ambulates household and community distances. Pt resides with her friend, in a one level house with no steps to enter. Personal factors affecting pt at time of initial evaluation include ambulating with an assistive device, inability to ambulate household distances, inability to ambulate community distances, inability to navigate level surfaces without external assistance, unable to perform dynamic tasks in the community, limited home support, positive fall history, difficulty performing ADLs, and inability to perform IADLs. Please find objective findings from PT assessment regarding body systems outlined above with impairments and limitations including weakness, impaired balance, decreased  endurance, gait deviations, pain, decreased activity tolerance, decreased functional mobility tolerance, and fall risk. The following objective measures were performed on initial evaluation Barthel Index: 40/100, Modified Yancy: 4 (moderate/severe disability), and AM-PAC 6-Clicks: 16/24. Pt's clinical presentation is currently unstable/unpredictable seen in pt's presentation of need for ongoing medical management/monitoring, pt is a fall risk, and pt requires cues and assist for safety with functional mobility. Pt to benefit from continued PT treatment to address deficits as defined above and maximize pt's level of function and independence with mobility. From a PT standpoint, recommendation at time of discharge would be level 2, moderate resource intensity in order to facilitate return to prior level of function.    Barriers to Discharge: Decreased caregiver support, Other (Comment) (decline in functional mobility)     Rehab Resource Intensity Level, PT: II (Moderate Resource Intensity)    See flowsheet documentation for full assessment.

## 2025-05-20 NOTE — OCCUPATIONAL THERAPY NOTE
Occupational Therapy Evaluation        Patient Name: Marisol Otoole  Today's Date: 5/20/2025 05/20/25 0741   OT Last Visit   OT Visit Date 05/20/25   Note Type   Note type Evaluation   Pain Assessment   Pain Assessment Tool 0-10   Pain Score 7   Pain Location/Orientation Orientation: Right;Location: Hip   Pain Onset/Description Onset: Ongoing   Hospital Pain Intervention(s) Repositioned;Ambulation/increased activity   Restrictions/Precautions   Weight Bearing Precautions Per Order No   Braces or Orthoses Other (Comment)  (none reported)   Other Precautions Cognitive;Chair Alarm;Bed Alarm;Fall Risk;Pain   Home Living   Type of Home House   Home Layout One level;Able to live on main level with bedroom/bathroom;Performs ADLs on one level  (No REGINO)   Bathroom Shower/Tub Walk-in shower   Bathroom Toilet Raised   Bathroom Equipment Grab bars in shower;Shower chair   Bathroom Accessibility Accessible   Home Equipment Walker;Cane;Other (Comment)  (Home O2)   Additional Comments Pt ambulates with a walker.   Prior Function   Level of [place] Independent with functional mobility;Independent with ADLs;Independent with IADLS   Lives With Friend(s)   Receives Help From Friend(s)   IADLs Independent with meal prep;Independent with medication management;Family/Friend/Other provides transportation   Falls in the last 6 months 1 to 4  (1 fall prior to admission; 2 falls)   Vocational Retired   Lifestyle   Autonomy Patient was independnet with ADLs/ required assistance with IADLs. Patient lives in a one story house with ramped entrance, with a friend. Patient was ambulating with a RW.   Reciprocal Relationships Supportive Friend   General   Family/Caregiver Present No   ADL   Where Assessed Edge of bed   Eating Assistance 5  Supervision/Setup   Grooming Assistance 4  Minimal Assistance   UB Bathing Assistance 4  Minimal Assistance   LB Bathing Assistance 2  Maximal Assistance   UB Dressing Assistance 4  Minimal  Assistance   LB Dressing Assistance 2  Maximal Assistance   Toileting Assistance  2  Maximal Assistance   Functional Assistance 3  Moderate Assistance   Bed Mobility   Supine to Sit 4  Minimal assistance   Additional items Assist x 1;HOB elevated;Bedrails;Increased time required;Verbal cues;LE management   Transfers   Sit to Stand 4  Minimal assistance   Additional items Assist x 1;Increased time required;Verbal cues   Stand to Sit 4  Minimal assistance   Additional items Assist x 1;Armrests;Increased time required;Verbal cues   Functional Mobility   Functional Mobility 4  Minimal assistance   Additional Comments assist of 1 with RW/ slow yemi/ short stepping/ verbal cues needed for correct posture   Additional items Rolling walker   Balance   Static Sitting Fair +   Dynamic Sitting Fair   Static Standing Fair -   Dynamic Standing Poor +   Ambulatory Poor   Activity Tolerance   Activity Tolerance Patient tolerated treatment well;Patient limited by fatigue   Medical Staff Made Aware Co-evaluation performed with PT secondary to complex medical condition of patient and regression of functional status from baseline.   RUE Assessment   RUE Assessment X   RUE Strength   RUE Overall Strength Deficits  (3/5)   LUE Assessment   LUE Assessment X   LUE Strength   LUE Overall Strength Deficits  (3/5)   Hand Function   Gross Motor Coordination Functional   Fine Motor Coordination Functional   Sensation   Light Touch No apparent deficits  (BUEs)   Vision-Basic Assessment   Current Vision Wears glasses all the time   Psychosocial   Psychosocial (WDL) WDL   Perception   Inattention/Neglect Appears intact   Cognition   Overall Cognitive Status WFL   Arousal/Participation Alert;Responsive;Cooperative   Attention Within functional limits   Orientation Level Oriented X4   Memory Decreased recall of recent events   Following Commands Follows multistep commands without difficulty   Assessment   Limitation Decreased ADL  status;Decreased UE strength;Decreased UE ROM;Decreased Safe judgement during ADL;Decreased endurance;Decreased self-care trans;Decreased high-level ADLs   Prognosis Good   Assessment Patient is a 84 y.o. female seen for OT evaluation s/p admit to [facility] on 5/20/2025 w/Acute cystitis without hematuria. Commorbidities affecting patient's functional performance at time of assessment include: GERD, acquired hypothyroidism, primary hypertension, hyperlipidemia, morbid obesity, chronic heart failure, mild COPD, obstructive sleep apnea, fall, ERIN, paroxysmal atrial fibrillation, and pyelitis.  Orders placed for OT evaluation and treatment.  Performed at least two patient identifiers during session including name and wristband.  Prior to admission, Patient was independnet with ADLs/ required assistance with IADLs. Patient lives in a one story house with ramped entrance, with a friend. Patient was ambulating with a RW.  Personal factors affecting patient at time of initial evaluation include: limited caregiver support, decreased initiation and engagement, difficulty performing ADLs, and difficulty performing IADLs. Upon evaluation, patient requires minimal  assist for UB ADLs, maximal assist for LB ADLs, transfers and functional ambulation in room and bathroom with minimal  and moderate assist, with the use of Rolling Walker.   Occupational performance is affected by the following deficits: decreased functional use of BUEs, limited active ROM, decreased muscle strength, degenerative arthritic joint changes, dynamic sit/ stand balance deficit with poor standing tolerance time for self care and functional mobility, decreased activity tolerance, increased pain, and postural control and postural alignment deficit, requiring external assistance to complete transitional movements.  Patient to benefit from continued Occupational Therapy treatment while in the hospital to address deficits as defined above and  maximize level of functional independence with ADLs and functional mobility. Occupational Performance areas to address include: bathing/ shower, dressing, toilet hygiene, transfer to all surfaces, functional mobility, health maintenance, IADLs: safety procedures, and Leisure Participation. From OT standpoint, recommendation at time of d/c would be Level 2.   Plan   Treatment Interventions ADL retraining;Functional transfer training;UE strengthening/ROM;Endurance training;Patient/family training;Equipment evaluation/education;Compensatory technique education;Continued evaluation;Energy conservation;Activityengagement   Goal Expiration Date 06/03/25   OT Frequency 3-5x/wk   Discharge Recommendation   Rehab Resource Intensity Level, OT II (Moderate Resource Intensity)   AM-PAC Daily Activity Inpatient   Lower Body Dressing 2   Bathing 2   Toileting 2   Upper Body Dressing 2   Grooming 2   Eating 3   Daily Activity Raw Score 13   Daily Activity Standardized Score (Calc for Raw Score >=11) 32.03   AM-PAC Applied Cognition Inpatient   Following a Speech/Presentation 4   Understanding Ordinary Conversation 4   Taking Medications 3   Remembering Where Things Are Placed or Put Away 4   Remembering List of 4-5 Errands 3   Taking Care of Complicated Tasks 3   Applied Cognition Raw Score 21   Applied Cognition Standardized Score 44.3   Barthel Index   Feeding 5   Bathing 0   Grooming Score 0   Dressing Score 5   Bladder Score 5   Bowels Score 10   Toilet Use Score 5   Transfers (Bed/Chair) Score 10   Mobility (Level Surface) Score 0   Stairs Score 0   Barthel Index Score 40       1 - Patient will verbalize and demonstrate use of energy conservation/ deep breathing technique and work simplification skills during functional activity with no verbal cues.    2 - Patient will verbalize and demonstrate good body mechanics and joint protection techniques during  ADLs/ IADLs with no verbal cues.    3 - Patient will increase OOB/  sitting tolerance to 2-4 hours per day for increased participation in self care and leisure tasks with no s/s of exertion.    4 - Patient will increase standing tolerance time to 5  minutes with unilateral UE support to complete sink level ADLs@ mod I level.    5 - Patient will increase sitting tolerance at edge of bed to 20 minutes to complete UB ADLs @ set up assist level.    6 - Patient will transfer bed to Chair / toilet at Set up assist level with AD as indicated.     7 - Patient will complete UB ADLs with set up assist.     8 - Patient will complete LB ADLs with min assist with the use of adaptive equipment.     9 - Patient will complete toileting hygiene with set up assist/ supervision for thoroughness    10 - Patient/ Family  will demonstrate competency with UE Home Exercise Program.

## 2025-05-21 LAB
ANION GAP SERPL CALCULATED.3IONS-SCNC: 7 MMOL/L (ref 4–13)
BUN SERPL-MCNC: 44 MG/DL (ref 5–25)
CALCIUM SERPL-MCNC: 8.7 MG/DL (ref 8.4–10.2)
CHLORIDE SERPL-SCNC: 108 MMOL/L (ref 96–108)
CO2 SERPL-SCNC: 21 MMOL/L (ref 21–32)
CREAT SERPL-MCNC: 1.92 MG/DL (ref 0.6–1.3)
ERYTHROCYTE [DISTWIDTH] IN BLOOD BY AUTOMATED COUNT: 19 % (ref 11.6–15.1)
GFR SERPL CREATININE-BSD FRML MDRD: 23 ML/MIN/1.73SQ M
GLUCOSE SERPL-MCNC: 120 MG/DL (ref 65–140)
HCT VFR BLD AUTO: 27.7 % (ref 34.8–46.1)
HGB BLD-MCNC: 8.6 G/DL (ref 11.5–15.4)
INR PPP: 3.4 (ref 0.85–1.19)
MCH RBC QN AUTO: 27.3 PG (ref 26.8–34.3)
MCHC RBC AUTO-ENTMCNC: 31 G/DL (ref 31.4–37.4)
MCV RBC AUTO: 88 FL (ref 82–98)
PLATELET # BLD AUTO: 256 THOUSANDS/UL (ref 149–390)
PMV BLD AUTO: 9 FL (ref 8.9–12.7)
POTASSIUM SERPL-SCNC: 4.1 MMOL/L (ref 3.5–5.3)
PROTHROMBIN TIME: 34.9 SECONDS (ref 12.3–15)
RBC # BLD AUTO: 3.15 MILLION/UL (ref 3.81–5.12)
SODIUM SERPL-SCNC: 136 MMOL/L (ref 135–147)
WBC # BLD AUTO: 10.96 THOUSAND/UL (ref 4.31–10.16)

## 2025-05-21 PROCEDURE — 85027 COMPLETE CBC AUTOMATED: CPT | Performed by: FAMILY MEDICINE

## 2025-05-21 PROCEDURE — 94664 DEMO&/EVAL PT USE INHALER: CPT

## 2025-05-21 PROCEDURE — 80048 BASIC METABOLIC PNL TOTAL CA: CPT | Performed by: FAMILY MEDICINE

## 2025-05-21 PROCEDURE — 94660 CPAP INITIATION&MGMT: CPT

## 2025-05-21 PROCEDURE — 85610 PROTHROMBIN TIME: CPT | Performed by: FAMILY MEDICINE

## 2025-05-21 PROCEDURE — 99232 SBSQ HOSP IP/OBS MODERATE 35: CPT | Performed by: FAMILY MEDICINE

## 2025-05-21 RX ORDER — PREGABALIN 25 MG/1
25 CAPSULE ORAL ONCE
Status: COMPLETED | OUTPATIENT
Start: 2025-05-21 | End: 2025-05-21

## 2025-05-21 RX ORDER — OXYBUTYNIN CHLORIDE 5 MG/1
5 TABLET, EXTENDED RELEASE ORAL DAILY
Status: DISCONTINUED | OUTPATIENT
Start: 2025-05-22 | End: 2025-05-26 | Stop reason: HOSPADM

## 2025-05-21 RX ORDER — SODIUM CHLORIDE 9 MG/ML
50 INJECTION, SOLUTION INTRAVENOUS CONTINUOUS
Status: DISPENSED | OUTPATIENT
Start: 2025-05-21 | End: 2025-05-21

## 2025-05-21 RX ADMIN — ACETAMINOPHEN 650 MG: 325 TABLET ORAL at 19:49

## 2025-05-21 RX ADMIN — PANTOPRAZOLE SODIUM 40 MG: 40 TABLET, DELAYED RELEASE ORAL at 05:36

## 2025-05-21 RX ADMIN — SODIUM CHLORIDE 50 ML/HR: 0.9 INJECTION, SOLUTION INTRAVENOUS at 08:05

## 2025-05-21 RX ADMIN — ACETAMINOPHEN 650 MG: 325 TABLET ORAL at 13:11

## 2025-05-21 RX ADMIN — PREGABALIN 25 MG: 25 CAPSULE ORAL at 21:45

## 2025-05-21 RX ADMIN — ATENOLOL 25 MG: 25 TABLET ORAL at 08:43

## 2025-05-21 RX ADMIN — PRAVASTATIN SODIUM 80 MG: 80 TABLET ORAL at 15:45

## 2025-05-21 RX ADMIN — CEFTRIAXONE SODIUM 2000 MG: 10 INJECTION, POWDER, FOR SOLUTION INTRAVENOUS at 15:45

## 2025-05-21 RX ADMIN — LEVOTHYROXINE SODIUM 50 MCG: 0.05 TABLET ORAL at 05:36

## 2025-05-21 NOTE — PLAN OF CARE
Problem: Potential for Falls  Goal: Patient will remain free of falls  Description: INTERVENTIONS:  - Educate patient/family on patient safety including physical limitations  - Instruct patient to call for assistance with activity   - Consider consulting OT/PT to assist with strengthening/mobility based on AM PAC & JH-HLM score  - Consult OT/PT to assist with strengthening/mobility   - Keep Call bell within reach  - Keep bed low and locked with side rails adjusted as appropriate  - Keep care items and personal belongings within reach  - Initiate and maintain comfort rounds  - Make Fall Risk Sign visible to staff  - Offer Toileting every 2 Hours, in advance of need  - Initiate/Maintain bed alarm  - Obtain necessary fall risk management equipment:   - Apply yellow socks and bracelet for high fall risk patients  - Consider moving patient to room near nurses station  Outcome: Progressing

## 2025-05-21 NOTE — RESPIRATORY THERAPY NOTE
RT Protocol Note  Marisol Otoole 84 y.o. female MRN: [id number]  Unit/Bed#: 2 E 257-01 Encounter: [id number]    Assessment    Principal Problem:    Acute cystitis without hematuria  Active Problems:    GERD (gastroesophageal reflux disease)    Acquired hypothyroidism    Primary hypertension    Hyperlipidemia    Morbid obesity due to excess calories ([facility])    Chronic heart failure with preserved ejection fraction (HFpEF) ([facility])    COPD, mild ([facility])    JANICE on CPAP    Fall    ERIN (acute kidney injury) ([facility])    Paroxysmal atrial fibrillation ([facility])    S/P placement of cardiac pacemaker    Pyelitis      Home Pulmonary Medications:    Home Devices/Therapy: Home O2, BiPAP/CPAP    Past Medical History:   Diagnosis Date    Anemia 08/22/2018    Anxiety     Arthritis     AVB (atrioventricular block)     first degree    Cataract     CHF (congestive heart failure) ([facility])     COPD (chronic obstructive pulmonary disease) ([facility])     COPD, mild ([facility])     Coronary artery disease     Dislocation of right shoulder joint     Frequent UTI     GERD (gastroesophageal reflux disease)     H/O: pneumonia     Heme positive stool     Hyperlipidemia     Hypertension     Hypothyroidism     Morbid obesity with BMI of 50.0-59.9, adult ([facility])     Obesity, morbid ([facility]) 08/22/2018    JANICE on CPAP     Pulmonary hypertension ([facility]) 08/22/2018    Severe aortic stenosis     Simple goiter     Skin cyst     within the armpits, right    Wears glasses      Social History     Socioeconomic History    Marital status: Single     Spouse name: None    Number of children: None    Years of education: None    Highest education level: None   Occupational History    Occupation: retired   Tobacco Use    Smoking status: Never     Passive exposure: Never    Smokeless tobacco: Never   Vaping Use    Vaping status: Never Used   Substance and Sexual Activity    Alcohol use: Not Currently    Drug use: Never    Sexual activity: Never   Other Topics Concern    None   Social History  Narrative    Denied drinking coffee    Denied exercise habits    Most recent tobacco use screenin2018      Do you currently or have you served in the [place] [employer]:   No      Were you activated, into active duty, as a member of the [employer] or as a Reservist:   No          Social Drivers of Health     Financial Resource Strain: Low Risk  (10/24/2023)    Overall Financial Resource Strain (CARDIA)     Difficulty of Paying Living Expenses: Not hard at all   Food Insecurity: No Food Insecurity (2025)    Nursing - Inadequate Food Risk Classification     Worried About Running Out of Food in the Last Year: Never true     Ran Out of Food in the Last Year: Never true     Ran Out of Food in the Last Year: Never true   Transportation Needs: No Transportation Needs (2025)    Nursing - Transportation Risk Classification     Lack of Transportation: Not on file     Lack of Transportation: No   Physical Activity: Not on file   Stress: Not on file   Social Connections: Not on file   Intimate Partner Violence: Unknown (2025)    Nursing IPS     Feels Physically and Emotionally Safe: Not on file     Physically Hurt by Someone: Not on file     Humiliated or Emotionally Abused by Someone: Not on file     Physically Hurt by Someone: No     Hurt or Threatened by Someone: No   Housing Stability: Unknown (2025)    Nursing: Inadequate Housing Risk Classification     Has Housing: Not on file     Worried About Losing Housing: Not on file     Unable to Get Utilities: Not on file     Unable to Pay for Housing in the Last Year: No     Has Housin       Subjective         Objective    Physical Exam:   Assessment Type: Assess only  General Appearance: Awake  Respiratory Pattern: Normal  Chest Assessment: Chest expansion symmetrical  Bilateral Breath Sounds: Diminished  O2 Device: v60 Gabby    Vitals:  Blood pressure 113/55, pulse 61, temperature 98.8 °F (37.1 °C), resp. rate 22, SpO2 96%, not currently  breastfeeding.          Imaging and other studies:     O2 Device: v60 Gabby     Plan    Respiratory Plan: Home Bronchodilator Patient pathway        Resp Comments: pt with hx of copd, uses cpap at home

## 2025-05-21 NOTE — PROGRESS NOTES
Progress Note - Hospitalist   Name: Marisol Otoole 84 y.o. female I MRN: [id number]  Unit/Bed#: 2 E 257-01 I Date of Admission: 5/20/2025   Date of Service: 5/21/2025 I Hospital Day: 1    Assessment & Plan  Acute cystitis without hematuria    Follow-up with urine cultures.  Patient's fever trend has improved.  Ceftriaxone 2 g daily  Pyelitis  Continue Rocephin  Fall  Fall at home, consult PT OT/case management  ERIN (acute kidney injury) (HCC)  I have held her Lasix,  Continue IV fluids  Chronic heart failure with preserved ejection fraction (HFpEF) (HCC)  Continue atenolol, lasix held due to ERIN    Wt Readings from Last 3 Encounters:   05/12/25 77.1 kg (170 lb)   05/09/25 77.1 kg (170 lb)   05/08/25 78.9 kg (174 lb)     Paroxysmal atrial fibrillation (HCC)  Continue atenolol and Coumadin  Still with supratherapeutic INR  GERD (gastroesophageal reflux disease)  Continue PPI  Acquired hypothyroidism  Continue levothyroxine  Primary hypertension  Continue Tylenol  Hyperlipidemia  Continue statin  S/P placement of cardiac pacemaker  Secondary SSS s/p MDT PPM   COPD, mild (HCC)    JANICE on CPAP  Cpap   Morbid obesity due to excess calories (HCC)  BMI 40.99    VTE Pharmacologic Prophylaxis: VTE Score: 7 Moderate Risk (Score 3-4) - Pharmacological DVT Prophylaxis Ordered: warfarin (Coumadin).    Mobility:   Basic Mobility Inpatient Raw Score: 12  JH-HLM Goal: 4: Move to chair/commode  JH-HLM Achieved: 1: Laying in bed  JH-HLM Goal NOT achieved. Continue with multidisciplinary rounding and encourage appropriate mobility to improve upon JH-HLM goals.    Patient Centered Rounds: I performed bedside rounds with nursing staff today.       Education and Discussions with Family / Patient: Patient declined call to .     Current Length of Stay: 1 day(s)  Current Patient Status: Inpatient   Certification Statement: The patient will continue to require additional inpatient hospital stay due to needing improvement in renal  function  Discharge Plan: Anticipate discharge in 24-48 hrs to discharge location to be determined pending rehab evaluations.    Code Status: Level 1 - Full Code    Subjective   Patient seen and examined.  No acute events reported.  Patient is doing okay at this time    Objective :  Temp:  [97.9 °F (36.6 °C)-101.5 °F (38.6 °C)] 99.2 °F (37.3 °C)  HR:  [58-67] 58  BP: ()/(43-56) 125/56  Resp:  [18-23] 20  SpO2:  [80 %-99 %] 97 %  O2 Device: CPAP  Nasal Cannula O2 Flow Rate (L/min):  [4 L/min] 4 L/min    There is no height or weight on file to calculate BMI.     Input and Output Summary (last 24 hours):     Intake/Output Summary (Last 24 hours) at 5/21/2025 1425  Last data filed at 5/21/2025 1252  Gross per 24 hour   Intake 592 ml   Output --   Net 592 ml       Physical Exam  General Appearance:    Alert, cooperative, no distress, appears stated age                               Lungs:     Clear to auscultation bilaterally, respirations unlabored       Heart:    Regular rate and rhythm, S1 and S2 normal, no murmur, rub    or gallop   Abdomen:     Soft, non-tender, bowel sounds active all four quadrants,     no masses, no organomegaly           Extremities:   Extremities normal, atraumatic, no cyanosis or edema                         Lines/Drains:              Lab Results: I have reviewed the following results:   Results from last 7 days   Lab Units 05/21/25 0624 05/20/25  1301 05/20/25  0133   WBC Thousand/uL 10.96*   < > 12.65*   HEMOGLOBIN g/dL 8.6*   < > 9.3*   HEMATOCRIT % 27.7*   < > 30.3*   PLATELETS Thousands/uL 256   < > 318   SEGS PCT %  --   --  82*   LYMPHO PCT %  --   --  6*   MONO PCT %  --   --  10   EOS PCT %  --   --  1    < > = values in this interval not displayed.     Results from last 7 days   Lab Units 05/21/25  0624 05/20/25  1301 05/20/25  0133   SODIUM mmol/L 136   < > 136   POTASSIUM mmol/L 4.1   < > 4.7   CHLORIDE mmol/L 108   < > 105   CO2 mmol/L 21   < > 22   BUN mg/dL 44*   < > 42*    CREATININE mg/dL 1.92*   < > 1.77*   ANION GAP mmol/L 7   < > 9   CALCIUM mg/dL 8.7   < > 9.2   ALBUMIN g/dL  --   --  3.4*   TOTAL BILIRUBIN mg/dL  --   --  0.39   ALK PHOS U/L  --   --  90   ALT U/L  --   --  20   AST U/L  --   --  32   GLUCOSE RANDOM mg/dL 120   < > 139    < > = values in this interval not displayed.     Results from last 7 days   Lab Units 05/21/25  0624   INR  3.40*             Results from last 7 days   Lab Units 05/20/25  0321   LACTIC ACID mmol/L 0.7       Recent Cultures (last 7 days):   Results from last 7 days   Lab Units 05/20/25  0325 05/20/25  0323 05/20/25  0321   BLOOD CULTURE  No Growth at 24 hrs.  --   --    GRAM STAIN RESULT   --   --  Gram negative rods*   URINE CULTURE   --  >100,000 cfu/ml Klebsiella pneumoniae*  --        Imaging Results Review: No pertinent imaging studies reviewed.  Other Study Results Review: No additional pertinent studies reviewed.    Last 24 Hours Medication List:     Current Facility-Administered Medications:     acetaminophen (TYLENOL) tablet 650 mg, Q6H PRN    atenolol (TENORMIN) tablet 25 mg, Daily    cefTRIAXone (ROCEPHIN) 2,000 mg in dextrose 5 % 50 mL IVPB, Q24H, Last Rate: 2,000 mg (05/20/25 1624)    levothyroxine tablet 50 mcg, Early Morning    pantoprazole (PROTONIX) EC tablet 40 mg, Early Morning    pravastatin (PRAVACHOL) tablet 80 mg, Daily With Dinner    sodium chloride 0.9 % infusion, Continuous, Last Rate: 50 mL/hr (05/21/25 0805)    Administrative Statements   Today, Patient Was Seen By: Cristian Escalante MD  I have spent a total time of 24 minutes in caring for this patient on the day of the visit/encounter including Prognosis, Risks and benefits of tx options, Instructions for management, Patient and family education, Importance of tx compliance, Counseling / Coordination of care, Documenting in the medical record, Reviewing/placing orders in the medical record (including tests, medications, and/or procedures), Obtaining or  reviewing history  , and Communicating with other healthcare professionals .    **Please Note: This note may have been constructed using a voice recognition system.**

## 2025-05-21 NOTE — RESPIRATORY THERAPY NOTE
05/20/25 1651   Respiratory Assessment   Resp Comments placed on cpap for HS use   O2 Device v60 Gabby   Non-Invasive Information   O2 Interface Device Face mask   Non-Invasive Ventilation Mode CPAP   SpO2 99 %   $ Pulse Oximetry Spot Check Charge Completed   Non-Invasive Settings   FiO2 (%) 40   PEEP/CPAP (cm H2O) 8   Rise Time 2  (c felx)   Humidification   (passover)   Temperature (Set)   (n/a)   Non-Invasive Readings   Total Rate 29   Spontaneous MV (mL) 12.2   Spontaneous Vt (mL) 420   Heater Temperature (Obs)   (n/a)   Leak (lpm) 0   Skin Intervention Skin intact   Non-Invasive Alarms   Insp Pressure High (cm H20) 20   Insp Pressure Low (cm H20) 5   Low Insp Pressure Time (sec) 20 sec   MV Low (L/min) 2   Vt High (mL) 1200   Vt Low (mL) 200   High Resp Rate (BPM) 40 BPM   Low Resp Rate (BPM) 8 BPM   Maintenance   Water bag changed No     Home unit setting is auto PAP, hospital issued auto unit n/a, set for 8 cmH2o cpap

## 2025-05-22 ENCOUNTER — APPOINTMENT (INPATIENT)
Dept: ULTRASOUND IMAGING | Facility: HOSPITAL | Age: 85
DRG: 871 | End: 2025-05-22
Payer: MEDICARE

## 2025-05-22 LAB
ANION GAP SERPL CALCULATED.3IONS-SCNC: 7 MMOL/L (ref 4–13)
BACTERIA UR CULT: ABNORMAL
BACTERIA UR CULT: ABNORMAL
BUN SERPL-MCNC: 48 MG/DL (ref 5–25)
CALCIUM SERPL-MCNC: 8.7 MG/DL (ref 8.4–10.2)
CHLORIDE SERPL-SCNC: 109 MMOL/L (ref 96–108)
CK SERPL-CCNC: 28 U/L (ref 26–192)
CO2 SERPL-SCNC: 22 MMOL/L (ref 21–32)
CREAT SERPL-MCNC: 2.09 MG/DL (ref 0.6–1.3)
ERYTHROCYTE [DISTWIDTH] IN BLOOD BY AUTOMATED COUNT: 19 % (ref 11.6–15.1)
GFR SERPL CREATININE-BSD FRML MDRD: 21 ML/MIN/1.73SQ M
GLUCOSE SERPL-MCNC: 109 MG/DL (ref 65–140)
HCT VFR BLD AUTO: 29.1 % (ref 34.8–46.1)
HGB BLD-MCNC: 8.7 G/DL (ref 11.5–15.4)
INR PPP: 2.83 (ref 0.85–1.19)
MCH RBC QN AUTO: 26.4 PG (ref 26.8–34.3)
MCHC RBC AUTO-ENTMCNC: 29.9 G/DL (ref 31.4–37.4)
MCV RBC AUTO: 88 FL (ref 82–98)
PLATELET # BLD AUTO: 270 THOUSANDS/UL (ref 149–390)
PMV BLD AUTO: 9.4 FL (ref 8.9–12.7)
POTASSIUM SERPL-SCNC: 4 MMOL/L (ref 3.5–5.3)
PROTHROMBIN TIME: 30.4 SECONDS (ref 12.3–15)
RBC # BLD AUTO: 3.29 MILLION/UL (ref 3.81–5.12)
SODIUM SERPL-SCNC: 138 MMOL/L (ref 135–147)
WBC # BLD AUTO: 9.14 THOUSAND/UL (ref 4.31–10.16)

## 2025-05-22 PROCEDURE — 85027 COMPLETE CBC AUTOMATED: CPT | Performed by: FAMILY MEDICINE

## 2025-05-22 PROCEDURE — 99232 SBSQ HOSP IP/OBS MODERATE 35: CPT | Performed by: FAMILY MEDICINE

## 2025-05-22 PROCEDURE — 94760 N-INVAS EAR/PLS OXIMETRY 1: CPT

## 2025-05-22 PROCEDURE — 94664 DEMO&/EVAL PT USE INHALER: CPT

## 2025-05-22 PROCEDURE — 80048 BASIC METABOLIC PNL TOTAL CA: CPT | Performed by: FAMILY MEDICINE

## 2025-05-22 PROCEDURE — 82550 ASSAY OF CK (CPK): CPT | Performed by: INTERNAL MEDICINE

## 2025-05-22 PROCEDURE — 94660 CPAP INITIATION&MGMT: CPT

## 2025-05-22 PROCEDURE — 76775 US EXAM ABDO BACK WALL LIM: CPT

## 2025-05-22 PROCEDURE — 99222 1ST HOSP IP/OBS MODERATE 55: CPT | Performed by: INTERNAL MEDICINE

## 2025-05-22 PROCEDURE — 94640 AIRWAY INHALATION TREATMENT: CPT

## 2025-05-22 PROCEDURE — 85610 PROTHROMBIN TIME: CPT | Performed by: FAMILY MEDICINE

## 2025-05-22 RX ORDER — ATENOLOL 25 MG/1
25 TABLET ORAL DAILY
Status: DISCONTINUED | OUTPATIENT
Start: 2025-05-23 | End: 2025-05-26 | Stop reason: HOSPADM

## 2025-05-22 RX ORDER — SODIUM CHLORIDE, SODIUM GLUCONATE, SODIUM ACETATE, POTASSIUM CHLORIDE, MAGNESIUM CHLORIDE, SODIUM PHOSPHATE, DIBASIC, AND POTASSIUM PHOSPHATE .53; .5; .37; .037; .03; .012; .00082 G/100ML; G/100ML; G/100ML; G/100ML; G/100ML; G/100ML; G/100ML
75 INJECTION, SOLUTION INTRAVENOUS CONTINUOUS
Status: DISCONTINUED | OUTPATIENT
Start: 2025-05-22 | End: 2025-05-24

## 2025-05-22 RX ORDER — WARFARIN SODIUM 2.5 MG/1
2.5 TABLET ORAL
Status: DISCONTINUED | OUTPATIENT
Start: 2025-05-22 | End: 2025-05-26 | Stop reason: HOSPADM

## 2025-05-22 RX ORDER — ALBUTEROL SULFATE 0.83 MG/ML
2.5 SOLUTION RESPIRATORY (INHALATION) ONCE
Status: COMPLETED | OUTPATIENT
Start: 2025-05-22 | End: 2025-05-22

## 2025-05-22 RX ADMIN — ATENOLOL 25 MG: 25 TABLET ORAL at 08:44

## 2025-05-22 RX ADMIN — SODIUM CHLORIDE, SODIUM GLUCONATE, SODIUM ACETATE, POTASSIUM CHLORIDE, MAGNESIUM CHLORIDE, SODIUM PHOSPHATE, DIBASIC, AND POTASSIUM PHOSPHATE 75 ML/HR: .53; .5; .37; .037; .03; .012; .00082 INJECTION, SOLUTION INTRAVENOUS at 09:46

## 2025-05-22 RX ADMIN — OXYBUTYNIN CHLORIDE 5 MG: 5 TABLET, EXTENDED RELEASE ORAL at 08:44

## 2025-05-22 RX ADMIN — PANTOPRAZOLE SODIUM 40 MG: 40 TABLET, DELAYED RELEASE ORAL at 06:19

## 2025-05-22 RX ADMIN — ALBUTEROL SULFATE 2.5 MG: 2.5 SOLUTION RESPIRATORY (INHALATION) at 22:13

## 2025-05-22 RX ADMIN — CEFTRIAXONE SODIUM 2000 MG: 10 INJECTION, POWDER, FOR SOLUTION INTRAVENOUS at 17:00

## 2025-05-22 RX ADMIN — ACETAMINOPHEN 650 MG: 325 TABLET ORAL at 12:14

## 2025-05-22 RX ADMIN — LEVOTHYROXINE SODIUM 50 MCG: 0.05 TABLET ORAL at 06:19

## 2025-05-22 RX ADMIN — ACETAMINOPHEN 650 MG: 325 TABLET ORAL at 22:05

## 2025-05-22 RX ADMIN — PRAVASTATIN SODIUM 80 MG: 80 TABLET ORAL at 17:00

## 2025-05-22 RX ADMIN — WARFARIN SODIUM 2.5 MG: 2.5 TABLET ORAL at 17:00

## 2025-05-22 NOTE — ASSESSMENT & PLAN NOTE
I have held her Lasix,  Continue IV fluids.  Could be secondary to ATN contrast-induced.  Nephrology consultation appreciated

## 2025-05-22 NOTE — PROGRESS NOTES
Patient:  MAURISIO ELENA    MRN:  [id number]    Michellein Request ID:  [id number]    Level of care reserved:  Home Health Agency    Partner Reserved:  [facility], [place] [address] [phone]    Clinical needs requested:    Geography searched:  [id number]    Start of Service:    Request sent:  3:20pm EDT on 5/22/2025 by Laura Moncada    Partner reserved:  5:44pm EDT on 5/22/2025 by Laura Moncada    Choice list shared:  5:44pm EDT on 5/22/2025 by Laura Moncada

## 2025-05-22 NOTE — CASE MANAGEMENT
Case Management Assessment & Discharge Planning Note    Patient name Marisol Otoole  Location 2 [facility] 257/2 E 257-01 MRN [id number]  : 1940 Date 2025       Current Admission Date: 2025  Current Admission Diagnosis:Acute cystitis without hematuria   Patient Active Problem List    Diagnosis Date Noted    Acute cystitis without hematuria 2025    Pyelitis 2025    SVT (supraventricular tachycardia) ([facility]) 2025    NSVT (nonsustained ventricular tachycardia) ([facility]) 2025    Heart palpitations 04/10/2025    Supratherapeutic INR 2025    SSS (sick sinus syndrome) ([facility]) 2024    S/P placement of cardiac pacemaker 2024    Non-rheumatic aortic stenosis 2024    Nonrheumatic tricuspid valve regurgitation 2024    Presence of permanent cardiac pacemaker 2024    Paroxysmal atrial fibrillation ([facility]) 2024    Junctional bradycardia 2024    Anticoagulant long-term use 2024    Walker as ambulation aid 2024    Ambulatory dysfunction 10/24/2023    Fall 2023    ERIN (acute kidney injury) ([facility]) 2023    Orbital mass 2023    Radiculopathy, lumbar region 07/10/2021    History of depression 2021    Osteopenia 10/22/2020    Dyspnea 2020    Insomnia 2020    History of transcatheter aortic valve replacement (TAVR) 10/09/2018    Primary osteoarthritis of left shoulder     AVB (atrioventricular block)     Chronic heart failure with preserved ejection fraction (HFpEF) ([facility])     COPD, mild ([facility])     Coronary artery disease involving native coronary artery of native heart without angina pectoris     JANICE on CPAP     Iron deficiency anemia secondary to inadequate dietary iron intake 2018    Morbid obesity due to excess calories ([facility]) 2018    Hyperlipidemia 2017    Primary hypertension 2017    GERD (gastroesophageal reflux disease) 10/29/2017    Acquired hypothyroidism 10/29/2017    LVH (left  ventricular hypertrophy) 09/22/2016    Mitral annular calcification 09/22/2016    Moderate mitral valve stenosis 09/22/2016    Pulmonary hypertension (HCC) 09/22/2016      LOS (days): 2  Geometric Mean LOS (GMLOS) (days): 3.5  Days to GMLOS:1     OBJECTIVE:    Risk of Unplanned Readmission Score: 37.25     Current admission status: Inpatient       Preferred Pharmacy:   [id number]  [place] - [address]  [facility] [address]  Phone: [phone] Fax: [phone]    [facility] - [place], [place] - [address]  [address] [place] [address]  Phone: [phone] Fax: [phone]    Primary Care Provider: MABEL Hallman    Primary Insurance: MEDICARE  Secondary Insurance:     ASSESSMENT:  Active Health Care Proxies       Yuridia Hurtado Health Care Representative - Friend   Primary Phone: [phone] (Mobile)                 Advance Directives  Does patient have a Health Care POA?: Yes  Does patient have Advance Directives?: Yes  Advance Directives: Power of  for health care, Power of  for finance  Primary Contact: Patient's Friend (Yuridia)    Readmission Root Cause  30 Day Readmission: No    Patient Information  Admitted from:: Home  Mental Status: Alert  During Assessment patient was accompanied by: Not accompanied during assessment  Assessment information provided by:: Patient  Primary Caregiver: Self  Support Systems: Self, Friend  County of Residence: [place]  What city do you live in?: [place], [place]  Home entry access options. Select all that apply.: No steps to enter home  Type of Current Residence: [facility]  Living Arrangements: Lives w/ Friend  Is patient a ?: No    Activities of Daily Living Prior to Admission  Functional Status: Independent  Completes ADLs independently?: Yes  Ambulates independently?: Yes  Does patient use assisted devices?: Yes  Assisted Devices (DME) used: Walker, Straight Cane, Shower Chair,  Portable Oxygen concentrator, Home Oxygen concentrator  DME Company Name (respiratory supplies): [employer]  O2 Rate(s): 4L  Does patient currently own DME?: Yes  What DME does the patient currently own?: Straight Cane, Walker, Shower Chair  Does patient have a history of Outpatient Therapy (PT/OT)?: No  Does the patient have a history of Short-Term Rehab?: Yes  Does patient have a history of HHC?: Yes  Does patient currently have HHC?: Yes    Current Home Health Care  Type of Current Home Care Services: Home PT, Home OT, Nurse visit  [facility]  Current Home Health Follow-Up Provider:: PCP    Patient Information Continued  Income Source: Pension/senior living  Does patient have prescription coverage?: Yes  Can the patient afford their medications and any related supplies (such as glucometers or test strips)?: Yes (Uses [facility] in [place] and now switching to [facility])  Does patient receive dialysis treatments?: No  Does patient have a history of substance abuse?: No  Does patient have a history of Mental Health Diagnosis?: No    Means of Transportation  Means of Transport to Appts:: Friends    DISCHARGE DETAILS:    Discharge planning discussed with:: Patient  Freedom of Choice: Yes  Comments - Freedom of Choice: CM met with patient at bedside and introduced self and role. CM maintained FOC as it pertains to discharge planning. CM reviewed [facility], and she refused STR due to being there before and wanting to go home instead. Patient reported that she prefers to return home with [facility] and support from her friend. Her friend will provide transportation home and will bring in portable O2 at the time of discharge. Patient also agreeable with using [facility] for any new medications at the time of discharge.  CM contacted family/caregiver?: No- see comments (Patient declined.)  Were Treatment Team discharge recommendations reviewed with  patient/caregiver?: Yes  Did patient/caregiver verbalize understanding of patient care needs?: Yes  Were patient/caregiver advised of the risks associated with not following Treatment Team discharge recommendations?: Yes    Requested Home Health Care         Is the patient interested in HHC at discharge?: Yes  Home Health Discipline requested:: Physical Therapy, Occupational Therapy, Nursing  [facility] Agency Name[facility]  HHA External Referral Reason (only applicable if external HHA name selected): Patient has established relationship with provider  Home Health Follow-Up Provider:: PCP (Diya Schrader)  Home Health Services Needed:: Evaluate Functional Status and Safety, Gait/ADL Training, Oxygen Via Nasal Cannula, Heart Failure Management, COPD Management, Strengthening/Theraputic Exercises to Improve Function  Oxygen LPM Ordered (if applicable based on home health services needed):: 4 LPM  Homebound Criteria Met:: Requires the Assistance of Another Person for Safe Ambulation or to Leave the Home, Uses an Assist Device (i.e. cane, walker, etc)  Supporting Clincal Findings:: Dyspnea with Exertion, Fatigues Easliy in Short Distances, Limited Endurance, Requires Oxygen    DME Referral Provided  Referral made for DME?: No    Other Referral/Resources/Interventions Provided:  Interventions: HHC, Outpatient   Referral Comments: CM sent a referral to [facility] via [facility] to determine if they are able to resume care for patient at discharge. Response pending. CM also sent a referral to the OP CM team via in basket message due to patient having a yellow URR score and COPD/CHF.  Programs:: CHF, COPD    Would you like to participate in our [facility] service program?  : Yes    Treatment Team Recommendation: Short Term Rehab  Discharge Destination Plan:: Home with Home Health Care  Transport at Discharge : Family    IMM Given (Date):: 05/22/25  IMM Given to:: Patient (CM reviewed IMM with  patient at bedside. She reported understanding of these rights and denied any questions or concerns at this time. Copy left at bedside. Original copy placed in medicala records.)

## 2025-05-22 NOTE — PLAN OF CARE
Problem: Potential for Falls  Goal: Patient will remain free of falls  Description: INTERVENTIONS:  - Educate patient/family on patient safety including physical limitations  - Instruct patient to call for assistance with activity   - Consider consulting OT/PT to assist with strengthening/mobility based on AM PAC & JH-HLM score  - Consult OT/PT to assist with strengthening/mobility   - Keep Call bell within reach  - Keep bed low and locked with side rails adjusted as appropriate  - Keep care items and personal belongings within reach  - Initiate and maintain comfort rounds  - Make Fall Risk Sign visible to staff  - Offer Toileting every  Hours, in advance of need  - Initiate/Maintain alarm  - Obtain necessary fall risk management equipment:   - Apply yellow socks and bracelet for high fall risk patients  - Consider moving patient to room near nurses station  5/22/2025 0002 by Vilma Dawkins RN  Outcome: Progressing  5/22/2025 0002 by Vilma Dawkins RN  Outcome: Progressing     Problem: PAIN - ADULT  Goal: Verbalizes/displays adequate comfort level or baseline comfort level  Description: Interventions:  - Encourage patient to monitor pain and request assistance  - Assess pain using appropriate pain scale  - Administer analgesics as ordered based on type and severity of pain and evaluate response  - Implement non-pharmacological measures as appropriate and evaluate response  - Consider cultural and social influences on pain and pain management  - Notify physician/advanced practitioner if interventions unsuccessful or patient reports new pain  - Educate patient/family on pain management process including their role and importance of  reporting pain   - Provide non-pharmacologic/complimentary pain relief interventions  Outcome: Progressing     Problem: INFECTION - ADULT  Goal: Absence or prevention of progression during hospitalization  Description: INTERVENTIONS:  - Assess and monitor for signs and symptoms of  infection  - Monitor lab/diagnostic results  - Monitor all insertion sites, i.e. indwelling lines, tubes, and drains  - Monitor endotracheal if appropriate and nasal secretions for changes in amount and color  - Withams appropriate cooling/warming therapies per order  - Administer medications as ordered  - Instruct and encourage patient and family to use good hand hygiene technique  - Identify and instruct in appropriate isolation precautions for identified infection/condition  Outcome: Progressing  Goal: Absence of fever/infection during neutropenic period  Description: INTERVENTIONS:  - Monitor WBC  - Perform strict hand hygiene  - Limit to healthy visitors only  - No plants, dried, fresh or silk flowers with rooney in patient room  Outcome: Progressing     Problem: SAFETY ADULT  Goal: Patient will remain free of falls  Description: INTERVENTIONS:  - Educate patient/family on patient safety including physical limitations  - Instruct patient to call for assistance with activity   - Consider consulting OT/PT to assist with strengthening/mobility based on AM PAC & -HLM score  - Consult OT/PT to assist with strengthening/mobility   - Keep Call bell within reach  - Keep bed low and locked with side rails adjusted as appropriate  - Keep care items and personal belongings within reach  - Initiate and maintain comfort rounds  - Make Fall Risk Sign visible to staff  - Offer Toileting every  Hours, in advance of need  - Initiate/Maintain alarm  - Obtain necessary fall risk management equipment:   - Apply yellow socks and bracelet for high fall risk patients  - Consider moving patient to room near nurses station  5/22/2025 0002 by Vilma Dawkins RN  Outcome: Progressing  5/22/2025 0002 by Vilma Dawkins RN  Outcome: Progressing  Goal: Maintain or return to baseline ADL function  Description: INTERVENTIONS:  -  Assess patient's ability to carry out ADLs; assess patient's baseline for ADL function and identify physical  deficits which impact ability to perform ADLs (bathing, care of mouth/teeth, toileting, grooming, dressing, etc.)  - Assess/evaluate cause of self-care deficits   - Assess range of motion  - Assess patient's mobility; develop plan if impaired  - Assess patient's need for assistive devices and provide as appropriate  - Encourage maximum independence but intervene and supervise when necessary  - Involve family in performance of ADLs  - Assess for home care needs following discharge   - Consider OT consult to assist with ADL evaluation and planning for discharge  - Provide patient education as appropriate  - Monitor functional capacity and physical performance, use of AM PAC & JH-HLM   - Monitor gait, balance and fatigue with ambulation    Outcome: Progressing  Goal: Maintains/Returns to pre admission functional level  Description: INTERVENTIONS:  - Perform AM-PAC 6 Click Basic Mobility/ Daily Activity assessment daily.  - Set and communicate daily mobility goal to care team and patient/family/caregiver.   - Collaborate with rehabilitation services on mobility goals if consulted  - Perform Range of Motion  times a day.  - Reposition patient every  hours.  - Dangle patient  times a day  - Stand patient  times a day  - Ambulate patient  times a day  - Out of bed to chair  times a day   - Out of bed for meals times a day  - Out of bed for toileting  - Record patient progress and toleration of activity level   Outcome: Progressing     Problem: DISCHARGE PLANNING  Goal: Discharge to home or other facility with appropriate resources  Description: INTERVENTIONS:  - Identify barriers to discharge w/patient and caregiver  - Arrange for needed discharge resources and transportation as appropriate  - Identify discharge learning needs (meds, wound care, etc.)  - Arrange for interpretive services to assist at discharge as needed  - Refer to Case Management Department for coordinating discharge planning if the patient needs  post-hospital services based on physician/advanced practitioner order or complex needs related to functional status, cognitive ability, or social support system  Outcome: Progressing     Problem: Knowledge Deficit  Goal: Patient/family/caregiver demonstrates understanding of disease process, treatment plan, medications, and discharge instructions  Description: Complete learning assessment and assess knowledge base.  Interventions:  - Provide teaching at level of understanding  - Provide teaching via preferred learning methods  Outcome: Progressing     Problem: NEUROSENSORY - ADULT  Goal: Achieves stable or improved neurological status  Description: INTERVENTIONS  - Monitor and report changes in neurological status  - Monitor vital signs such as temperature, blood pressure, glucose, and any other labs ordered   - Initiate measures to prevent increased intracranial pressure  - Monitor for seizure activity and implement precautions if appropriate      Outcome: Progressing  Goal: Remains free of injury related to seizures activity  Description: INTERVENTIONS  - Maintain airway, patient safety  and administer oxygen as ordered  - Monitor patient for seizure activity, document and report duration and description of seizure to physician/advanced practitioner  - If seizure occurs,  ensure patient safety during seizure  - Reorient patient post seizure  - Seizure pads on all 4 side rails  - Instruct patient/family to notify RN of any seizure activity including if an aura is experienced  - Instruct patient/family to call for assistance with activity based on nursing assessment  - Administer anti-seizure medications if ordered    Outcome: Progressing  Goal: Achieves maximal functionality and self care  Description: INTERVENTIONS  - Monitor swallowing and airway patency with patient fatigue and changes in neurological status  - Encourage and assist patient to increase activity and self care.   - Encourage visually impaired,  hearing impaired and aphasic patients to use assistive/communication devices  Outcome: Progressing     Problem: CARDIOVASCULAR - ADULT  Goal: Maintains optimal cardiac output and hemodynamic stability  Description: INTERVENTIONS:  - Monitor I/O, vital signs and rhythm  - Monitor for S/S and trends of decreased cardiac output  - Administer and titrate ordered vasoactive medications to optimize hemodynamic stability  - Assess quality of pulses, skin color and temperature  - Assess for signs of decreased coronary artery perfusion  - Instruct patient to report change in severity of symptoms  Outcome: Progressing  Goal: Absence of cardiac dysrhythmias or at baseline rhythm  Description: INTERVENTIONS:  - Continuous cardiac monitoring, vital signs, obtain 12 lead EKG if ordered  - Administer antiarrhythmic and heart rate control medications as ordered  - Monitor electrolytes and administer replacement therapy as ordered  Outcome: Progressing     Problem: RESPIRATORY - ADULT  Goal: Achieves optimal ventilation and oxygenation  Description: INTERVENTIONS:  - Assess for changes in respiratory status  - Assess for changes in mentation and behavior  - Position to facilitate oxygenation and minimize respiratory effort  - Oxygen administered by appropriate delivery if ordered  - Initiate smoking cessation education as indicated  - Encourage broncho-pulmonary hygiene including cough, deep breathe, Incentive Spirometry  - Assess the need for suctioning and aspirate as needed  - Assess and instruct to report SOB or any respiratory difficulty  - Respiratory Therapy support as indicated  Outcome: Progressing     Problem: GASTROINTESTINAL - ADULT  Goal: Minimal or absence of nausea and/or vomiting  Description: INTERVENTIONS:  - Administer IV fluids if ordered to ensure adequate hydration  - Maintain NPO status until nausea and vomiting are resolved  - Nasogastric tube if ordered  - Administer ordered antiemetic medications as  needed  - Provide nonpharmacologic comfort measures as appropriate  - Advance diet as tolerated, if ordered  - Consider nutrition services referral to assist patient with adequate nutrition and appropriate food choices  Outcome: Progressing  Goal: Maintains or returns to baseline bowel function  Description: INTERVENTIONS:  - Assess bowel function  - Encourage oral fluids to ensure adequate hydration  - Administer IV fluids if ordered to ensure adequate hydration  - Administer ordered medications as needed  - Encourage mobilization and activity  - Consider nutritional services referral to assist patient with adequate nutrition and appropriate food choices  Outcome: Progressing  Goal: Maintains adequate nutritional intake  Description: INTERVENTIONS:  - Monitor percentage of each meal consumed  - Identify factors contributing to decreased intake, treat as appropriate  - Assist with meals as needed  - Monitor I&O, weight, and lab values if indicated  - Obtain nutrition services referral as needed  Outcome: Progressing  Goal: Establish and maintain optimal ostomy function  Description: INTERVENTIONS:  - Assess bowel function  - Encourage oral fluids to ensure adequate hydration  - Administer IV fluids if ordered to ensure adequate hydration   - Administer ordered medications as needed  - Encourage mobilization and activity  - Nutrition services referral to assist patient with appropriate food choices  - Assess stoma site  - Consider wound care consult   Outcome: Progressing  Goal: Oral mucous membranes remain intact  Description: INTERVENTIONS  - Assess oral mucosa and hygiene practices  - Implement preventative oral hygiene regimen  - Implement oral medicated treatments as ordered  - Initiate Nutrition services referral as needed  Outcome: Progressing     Problem: GENITOURINARY - ADULT  Goal: Maintains or returns to baseline urinary function  Description: INTERVENTIONS:  - Assess urinary function  - Encourage oral  fluids to ensure adequate hydration if ordered  - Administer IV fluids as ordered to ensure adequate hydration  - Administer ordered medications as needed  - Offer frequent toileting  - Follow urinary retention protocol if ordered  Outcome: Progressing  Goal: Absence of urinary retention  Description: INTERVENTIONS:  - Assess patient’s ability to void and empty bladder  - Monitor I/O  - Bladder scan as needed  - Discuss with physician/AP medications to alleviate retention as needed  - Discuss catheterization for long term situations as appropriate  Outcome: Progressing  Goal: Urinary catheter remains patent  Description: INTERVENTIONS:  - Assess patency of urinary catheter  - If patient has a chronic jeff, consider changing catheter if non-functioning  - Follow guidelines for intermittent irrigation of non-functioning urinary catheter  Outcome: Progressing     Problem: METABOLIC, FLUID AND ELECTROLYTES - ADULT  Goal: Electrolytes maintained within normal limits  Description: INTERVENTIONS:  - Monitor labs and assess patient for signs and symptoms of electrolyte imbalances  - Administer electrolyte replacement as ordered  - Monitor response to electrolyte replacements, including repeat lab results as appropriate  - Instruct patient on fluid and nutrition as appropriate  Outcome: Progressing  Goal: Fluid balance maintained  Description: INTERVENTIONS:  - Monitor labs   - Monitor I/O and WT  - Instruct patient on fluid and nutrition as appropriate  - Assess for signs & symptoms of volume excess or deficit  Outcome: Progressing  Goal: Glucose maintained within target range  Description: INTERVENTIONS:  - Monitor Blood Glucose as ordered  - Assess for signs and symptoms of hyperglycemia and hypoglycemia  - Administer ordered medications to maintain glucose within target range  - Assess nutritional intake and initiate nutrition service referral as needed  Outcome: Progressing     Problem: SKIN/TISSUE INTEGRITY -  ADULT  Goal: Skin Integrity remains intact(Skin Breakdown Prevention)  Description: Assess:  -Perform Lane assessment every   -Clean and moisturize skin every   -Inspect skin when repositioning, toileting, and assisting with ADLS  -Assess under medical devices such as  every   -Assess extremities for adequate circulation and sensation     Bed Management:  -Have minimal linens on bed & keep smooth, unwrinkled  -Change linens as needed when moist or perspiring  -Avoid sitting or lying in one position for more than  hours while in bed  -Keep HOB at degrees     Toileting:  -Offer bedside commode  -Assess for incontinence every   -Use incontinent care products after each incontinent episode such as     Activity:  -Mobilize patient  times a day  -Encourage activity and walks on unit  -Encourage or provide ROM exercises   -Turn and reposition patient every  Hours  -Use appropriate equipment to lift or move patient in bed  -Instruct/ Assist with weight shifting every  when out of bed in chair  -Consider limitation of chair time  hour intervals    Skin Care:  -Avoid use of baby powder, tape, friction and shearing, hot water or constrictive clothing  -Relieve pressure over bony prominences using   -Do not massage red bony areas    Next Steps:  -Teach patient strategies to minimize risks such as    -Consider consults to  interdisciplinary teams such as   Outcome: Progressing  Goal: Incision(s), wounds(s) or drain site(s) healing without S/S of infection  Description: INTERVENTIONS  - Assess and document dressing, incision, wound bed, drain sites and surrounding tissue  - Provide patient and family education  - Perform skin care/dressing changes every   Outcome: Progressing  Goal: Pressure injury heals and does not worsen  Description: Interventions:  - Implement low air loss mattress or specialty surface (Criteria met)  - Apply silicone foam dressing  - Instruct/assist with weight shifting every  minutes when in chair   -  Limit chair time to  hour intervals  - Use special pressure reducing interventions such as  when in chair   - Apply fecal or urinary incontinence containment device   - Perform passive or active ROM every   - Turn and reposition patient & offload bony prominences every  hours   - Utilize friction reducing device or surface for transfers   - Consider consults to  interdisciplinary teams such as   - Use incontinent care products after each incontinent episode such as  - Consider nutrition services referral as needed  Outcome: Progressing     Problem: HEMATOLOGIC - ADULT  Goal: Maintains hematologic stability  Description: INTERVENTIONS  - Assess for signs and symptoms of bleeding or hemorrhage  - Monitor labs  - Administer supportive blood products/factors as ordered and appropriate  Outcome: Progressing     Problem: MUSCULOSKELETAL - ADULT  Goal: Maintain or return mobility to safest level of function  Description: INTERVENTIONS:  - Assess patient's ability to carry out ADLs; assess patient's baseline for ADL function and identify physical deficits which impact ability to perform ADLs (bathing, care of mouth/teeth, toileting, grooming, dressing, etc.)  - Assess/evaluate cause of self-care deficits   - Assess range of motion  - Assess patient's mobility  - Assess patient's need for assistive devices and provide as appropriate  - Encourage maximum independence but intervene and supervise when necessary  - Involve family in performance of ADLs  - Assess for home care needs following discharge   - Consider OT consult to assist with ADL evaluation and planning for discharge  - Provide patient education as appropriate  Outcome: Progressing  Goal: Maintain proper alignment of affected body part  Description: INTERVENTIONS:  - Support, maintain and protect limb and body alignment  - Provide patient/ family with appropriate education  Outcome: Progressing

## 2025-05-22 NOTE — ASSESSMENT & PLAN NOTE
Patient had been on atenolol which will be held for systolic blood pressure less than 130.  Patient had episode of hypotension overnight with systolic blood pressure 84 mm Hg noted.

## 2025-05-22 NOTE — CONSULTS
Consultation    Nephrology   Marisol Otoole 84 y.o. female MRN: [id number]  Unit/Bed#: 2 E 257-01 Encounter: [id number]    History of Present Illness   Physician Requesting Consult: Cristian Escalante MD  Reason for Consult : -Acute kidney injury        Assessment & Plan  ERIN (acute kidney injury) (HCC)  Wt Readings from Last 3 Encounters:   05/12/25 77.1 kg (170 lb)   05/09/25 77.1 kg (170 lb)   05/08/25 78.9 kg (174 lb)     Baseline serum creatinine of 0.8 mg/dL  Presented with a fall and found to have creatinine of 1.77 mg/dL on admission  Etiology likely volume depletion plus presence of a ascending urinary tract infection.  Cannot rule out presence of septic ATN in the setting of blood cultures growing gram-negative rods.  Urinalysis had revealed the presence of hematuria and proteinuria.  Creatinine obtained today is 2.0 mg/dL and worse.  Likely cause of worsening appears to be use of contrast with CT scan on the day of arrival resulting in renal vasoconstriction  Expect renal parameters to worsen.  Initiate patient on gentle hydration with Plasma-Lyte.  Will obtain renal Ultrasound with bladder scan pre and postvoid.    Acute cystitis without hematuria  Noted to have presence of cystitis on imaging along with pyelitis with urine culture growing Klebsiella pneumonia.  Furthermore blood cultures are now growing gram-negative rods.  Remains on IV ceftriaxone.  Primary hypertension  Patient had been on atenolol which will be held for systolic blood pressure less than 130.  Patient had episode of hypotension overnight with systolic blood pressure 84 mm Hg noted.  Hyperlipidemia  Patient is on pravastatin.  Will order creatinine kinase enzyme.  Fall  Presenting with fall.  No acute fractures reported.  Paroxysmal atrial fibrillation (HCC)  Rate controlled at present time.  S/P placement of cardiac pacemaker  Due to past history of sick sinus syndrome.  Pyelitis  Noted on CT scan present in the left kidney.  Likely  cause of ERIN.      Thanks for the consult  Will continue to follow  Please call with questions/ concerns.  Above-mentioned orders and Plan in terms of management of ERIN were discussed with the team in depth and they agree.  Please secure chat the Nephrologist on call for this campus if you have any Nephrological questions or concerns.    Wilfred Wolf MD, 5/22/2025, 9:13 AM          HISTORY OF PRESENT ILLNESS:   Marisol Otoole is a 84 y.o.  female with pmh of multiple comorbidities including obstructive sleep apnea on CPAP, sick sinus syndrome status post permanent pacemaker, paroxysmal atrial fibrillation, GERD resume who was admitted for status post fall. Nephrology has been consulted for acute kidney injury.  Patient presented on May 28 after a fall at home.  Upon arrival labs obtained revealed elevated creatinine up to 1.7 mg/dL.  Urinalysis also revealed presence of hematuria and proteinuria.  A CT scan of chest abdomen pelvis with IV contrast revealed presence of cystitis with concomitant pyelitis on the left.  Patient was initiated on antibiotics including ceftriaxone.  Patient blood and urine culture are noted to be positive at present time.  Patient admits to adequate urination.  Denies any chest pain, shortness of breath, fever, chills, abdominal pain, lower extremity edema.  History obtained from the patient    Inpatient consult to Nephrology  Consult performed by: Wilfred Wolf MD  Consult ordered by: Cristian Escalante MD          Review of Systems   Constitutional:  Positive for fatigue.   HENT: Negative.     Eyes: Negative.    Respiratory: Negative.     Cardiovascular: Negative.    Gastrointestinal: Negative.    Endocrine: Negative.    Genitourinary: Negative.    Musculoskeletal: Negative.    Skin: Negative.    Allergic/Immunologic: Negative.    Neurological: Negative.    Hematological: Negative.    All other systems reviewed and are negative.       Historical Information   Problem List[1]  Past Medical  History[2]  Past Surgical History[3]  Social History   Social History     Substance and Sexual Activity   Alcohol Use Not Currently     Social History     Substance and Sexual Activity   Drug Use Never     Tobacco Use History[4]  Family History[5]    Meds/Allergies   current meds:   Current Facility-Administered Medications:     acetaminophen (TYLENOL) tablet 650 mg, Q6H PRN    atenolol (TENORMIN) tablet 25 mg, Daily    cefTRIAXone (ROCEPHIN) 2,000 mg in dextrose 5 % 50 mL IVPB, Q24H, Last Rate: 2,000 mg (25 154)    levothyroxine tablet 50 mcg, Early Morning    oxybutynin (DITROPAN-XL) 24 hr tablet 5 mg, Daily    pantoprazole (PROTONIX) EC tablet 40 mg, Early Morning    pravastatin (PRAVACHOL) tablet 80 mg, Daily With Dinner    warfarin (COUMADIN) tablet 2.5 mg, Daily (warfarin)    Scheduled Meds:  Current Facility-Administered Medications   Medication Dose Route Frequency Provider Last Rate    acetaminophen  650 mg Oral Q6H PRN Cristian Escalante MD      atenolol  25 mg Oral Daily Fernando Escalante MD      cefTRIAXone  2,000 mg Intravenous Q24H Cristian Escalante MD 2,000 mg (255)    levothyroxine  50 mcg Oral Early Morning Fernando Escalante MD      oxybutynin  5 mg Oral Daily MABEL Israel      pantoprazole  40 mg Oral Early Morning Fernando Escalante MD      pravastatin  80 mg Oral Daily With Dinner Fernando Escalante MD      warfarin  2.5 mg Oral Daily (warfarin) Cristian Escalante MD         PRN Meds:.  acetaminophen    Continuous Infusions:     Allergies[6]      Invasive Devices:     Invasive Devices       Peripheral Intravenous Line  Duration             Peripheral IV 25 Left Antecubital 2 days                      PHYSICAL EXAM  /65   Pulse 60   Temp 98.7 °F (37.1 °C)   Resp 19   SpO2 95%   Temp (24hrs), Av.6 °F (37 °C), Min:97.9 °F (36.6 °C), Max:99.4 °F (37.4 °C)        Intake/Output Summary (Last 24 hours) at 2025 0933  Last data filed  at 5/21/2025 1900  Gross per 24 hour   Intake 702 ml   Output --   Net 702 ml       I/O last 24 hours:  In: 702 [P.O.:702]  Out: -           Current Weight:    First Weight:    Physical Exam  Constitutional:       Appearance: She is well-developed. She is ill-appearing.   HENT:      Head: Normocephalic and atraumatic.     Eyes:      Pupils: Pupils are equal, round, and reactive to light.       Cardiovascular:      Rate and Rhythm: Normal rate and regular rhythm.      Heart sounds: Murmur heard.   Pulmonary:      Effort: Pulmonary effort is normal.   Abdominal:      General: Bowel sounds are normal.      Palpations: Abdomen is soft.     Musculoskeletal:         General: Normal range of motion.      Cervical back: Neck supple.     Skin:     General: Skin is warm.     Neurological:      Mental Status: She is alert and oriented to person, place, and time.           LABORATORY:    Results from last 7 days   Lab Units 05/22/25  0551 05/21/25  0624 05/20/25  1301 05/20/25  0133   WBC Thousand/uL 9.14 10.96* 14.18* 12.65*   HEMOGLOBIN g/dL 8.7* 8.6* 9.5* 9.3*   HEMATOCRIT % 29.1* 27.7* 31.9* 30.3*   PLATELETS Thousands/uL 270 256 324 318   POTASSIUM mmol/L 4.0 4.1 4.5 4.7   CHLORIDE mmol/L 109* 108 107 105   CO2 mmol/L 22 21 24 22   BUN mg/dL 48* 44* 37* 42*   CREATININE mg/dL 2.09* 1.92* 1.54* 1.77*   CALCIUM mg/dL 8.7 8.7 9.3 9.2      rest all reviewed    RADIOLOGY:  TRAUMA - CT head wo contrast   Final Result by Jose Guadalupe Little MD (05/20 3619)      No intracranial hemorrhage or calvarial fracture.      Small right temporoparietal scalp contusion.            Workstation performed: [id number]         TRAUMA - CT spine cervical wo contrast   Final Result by Jose Guadalupe Little MD (05/20 6675)      No cervical spine fracture or traumatic malalignment.                  Workstation performed: [id number]         TRAUMA - CT chest abdomen pelvis w contrast   Final Result by Jose Guadalupe Little MD (05/20 4999)      No findings of acute traumatic  "injury in the chest, abdomen or pelvis.      No displaced fracture identified.      Question cystitis with ascending urinary tract infection on the left and concomitant pyelitis. Clinical correlation recommended.      Additional stable chronic/nonemergent findings as above.         Workstation performed: [id number]         XR Trauma chest portable   Final Result by Dajuan Julien DO (05/20 0255)      No acute cardiopulmonary disease.      Other findings as above. Correlation with pending trauma CTs recommended.      Workstation performed: [id number]           Rest all reviewed    Portions of the record may have been created with voice recognition software. Occasional wrong word or \"sound a like\" substitutions may have occurred due to the inherent limitations of voice recognition software. Read the chart carefully and recognize, using context, where substitutions have occurred.If you have any questions, please contact the dictating provider.       [1]   Patient Active Problem List  Diagnosis    GERD (gastroesophageal reflux disease)    Acquired hypothyroidism    Primary hypertension    Hyperlipidemia    LVH (left ventricular hypertrophy)    Mitral annular calcification    Moderate mitral valve stenosis    Pulmonary hypertension (HCC)    Iron deficiency anemia secondary to inadequate dietary iron intake    Morbid obesity due to excess calories (HCC)    Primary osteoarthritis of left shoulder    AVB (atrioventricular block)    Chronic heart failure with preserved ejection fraction (HFpEF) (HCC)    COPD, mild (HCC)    Coronary artery disease involving native coronary artery of native heart without angina pectoris    JANICE on CPAP    History of transcatheter aortic valve replacement (TAVR)    Insomnia    Dyspnea    Osteopenia    History of depression    Radiculopathy, lumbar region    Orbital mass    Fall    ERIN (acute kidney injury) (HCC)    Ambulatory dysfunction    Walker as ambulation aid    Junctional " bradycardia    Anticoagulant long-term use    Paroxysmal atrial fibrillation (HCC)    Presence of permanent cardiac pacemaker    SSS (sick sinus syndrome) ([facility])    S/P placement of cardiac pacemaker    Non-rheumatic aortic stenosis    Nonrheumatic tricuspid valve regurgitation    Supratherapeutic INR    Heart palpitations    SVT (supraventricular tachycardia) ([facility])    NSVT (nonsustained ventricular tachycardia) ([facility])    Acute cystitis without hematuria    Pyelitis   [2]   Past Medical History:  Diagnosis Date    Anemia 08/22/2018    Anxiety     Arthritis     AVB (atrioventricular block)     first degree    Cataract     CHF (congestive heart failure) ([facility])     COPD (chronic obstructive pulmonary disease) ([facility])     COPD, mild (HCC)     Coronary artery disease     Dislocation of right shoulder joint     Frequent UTI     GERD (gastroesophageal reflux disease)     H/O: pneumonia     Heme positive stool     Hyperlipidemia     Hypertension     Hypothyroidism     Morbid obesity with BMI of 50.0-59.9, adult ([facility])     Obesity, morbid ([facility]) 08/22/2018    JANICE on CPAP     Pulmonary hypertension ([facility]) 08/22/2018    Severe aortic stenosis     Simple goiter     Skin cyst     within the armpits, right    Wears glasses    [3]   Past Surgical History:  Procedure Laterality Date    BREAST BIOPSY      CARDIAC CATHETERIZATION      CARDIAC ELECTROPHYSIOLOGY PROCEDURE N/A 8/12/2024    Procedure: Cardiac pacer implant;  Surgeon: Clarke Zuluaga MD;  Location:  CARDIAC CATH LAB;  Service: Cardiology    CARPAL TUNNEL RELEASE Bilateral     CHOLECYSTECTOMY      DILATION AND CURETTAGE OF UTERUS      HYSTEROSCOPY      MASTOID SURGERY      CO COLONOSCOPY FLX DX W/COLLJ SPEC WHEN PFRMD N/A 9/6/2018    Procedure: COLONOSCOPY;  Surgeon: Shree Sosa III, MD;  Location: [facility];  Service: Gastroenterology    CO ECHO TRANSESOPHAG R-T 2D W/PRB IMG ACQUISJ I&R N/A 10/9/2018    Procedure: INTRA-OP TRANSESOPHAGEAL ECHOCARDIOGRAM (GARRISON);  Surgeon:  Kushal Camarena DO;  Location: [facility];  Service: Cardiac Surgery    HI ESOPHAGOGASTRODUODENOSCOPY TRANSORAL DIAGNOSTIC N/A 8/31/2018    Procedure: ESOPHAGOGASTRODUODENOSCOPY (EGD);  Surgeon: Shree Sosa III, MD;  Location: [facility];  Service: Gastroenterology    HI REPLACE AORTIC VALVE OPENFEMORAL ARTERY APPROACH N/A 10/9/2018    Procedure: REPLACEMENT AORTIC VALVE TRANSCATHETER (TAVR) TRANSFEMORAL W/ 23 MM MENDOZA NOE S3 VALVE (ACCESS OF LEFT);  Surgeon: Kushal Camarena DO;  Location: [facility];  Service: Cardiac Surgery    TOTAL HIP ARTHROPLASTY Left 2007    TOTAL KNEE ARTHROPLASTY Bilateral    [4]   Social History  Tobacco Use   Smoking Status Never    Passive exposure: Never   Smokeless Tobacco Never   [5]   Family History  Problem Relation Name Age of Onset    Diabetes Mother      Stroke Mother      Cancer Father      Lung cancer Father      Diabetes Sister      Heart disease Sister      Hypertension Sister      Coronary artery disease Family      Diabetes Family      Hypertension Family      Cancer Family      Stroke Family      Thyroid disease Neg Hx     [6]   Allergies  Allergen Reactions    Latex Rash    Neosporin [Neomycin-Bacitracin Zn-Polymyx] Rash and Other (See Comments)     hives per [facility] order

## 2025-05-22 NOTE — RESPIRATORY THERAPY NOTE
RT Protocol Note  Marisol Otoole 84 y.o. female MRN: [id number]  Unit/Bed#: 2 E 257-01 Encounter: [id number]    Assessment    Principal Problem:    Acute cystitis without hematuria  Active Problems:    GERD (gastroesophageal reflux disease)    Acquired hypothyroidism    Primary hypertension    Hyperlipidemia    Morbid obesity due to excess calories (HCC)    Chronic heart failure with preserved ejection fraction (HFpEF) ([facility])    COPD, mild (HCC)    JANICE on CPAP    Fall    ERIN (acute kidney injury) ([facility])    Paroxysmal atrial fibrillation (HCC)    S/P placement of cardiac pacemaker    Pyelitis      Home Pulmonary Medications:     05/22/25 1300   Respiratory Protocol   Protocol Initiated? No   Protocol Selection Respiratory   Language Barrier? No   Medical & Social History Reviewed? Yes   Diagnostic Studies Reviewed? Yes   Physical Assessment Performed? Yes   Home Devices/Therapy Home O2;BiPAP/CPAP   Respiratory Plan Home Bronchodilator Patient pathway   Respiratory Assessment   Resp Comments continue current regimen, wears CPAP at home   Additional Assessments   Pulse 60   SpO2 97 %       Home Devices/Therapy: Home O2, BiPAP/CPAP    Past Medical History[1]  Social History[1]    Subjective         Objective    Physical Exam:   Assessment Type: Assess only  General Appearance: Sleeping  Respiratory Pattern: Normal  Chest Assessment: Chest expansion symmetrical  Bilateral Breath Sounds: Diminished    Vitals:  Blood pressure 130/60, pulse 60, temperature 98.2 °F (36.8 °C), resp. rate 20, SpO2 97%, not currently breastfeeding.          Imaging and other studies:     O2 Device: V60     Plan    Respiratory Plan: Home Bronchodilator Patient pathway        Resp Comments: continue current regimen, wears CPAP at home        [1]   Past Medical History:  Diagnosis Date    Anemia 08/22/2018    Anxiety     Arthritis     AVB (atrioventricular block)     first degree    Cataract     CHF (congestive heart failure) ([facility])     COPD (chronic  obstructive pulmonary disease) (HCC)     COPD, mild (HCC)     Coronary artery disease     Dislocation of right shoulder joint     Frequent UTI     GERD (gastroesophageal reflux disease)     H/O: pneumonia     Heme positive stool     Hyperlipidemia     Hypertension     Hypothyroidism     Morbid obesity with BMI of 50.0-59.9, adult (HCC)     Obesity, morbid (HCC) 2018    JANICE on CPAP     Pulmonary hypertension (HCC) 2018    Severe aortic stenosis     Simple goiter     Skin cyst     within the armpits, right    Wears glasses    [1]   Social History  Socioeconomic History    Marital status: Single   Occupational History    Occupation: retired   Tobacco Use    Smoking status: Never     Passive exposure: Never    Smokeless tobacco: Never   Vaping Use    Vaping status: Never Used   Substance and Sexual Activity    Alcohol use: Not Currently    Drug use: Never    Sexual activity: Never   Social History Narrative    Denied drinking coffee    Denied exercise habits    Most recent tobacco use screenin2018      Do you currently or have you served in the [employer]:   No      Were you activated, into active duty, as a member of the [employer] or as a Reservist:   No          Social Drivers of Health     Financial Resource Strain: Low Risk  (10/24/2023)    Overall Financial Resource Strain (CARDIA)     Difficulty of Paying Living Expenses: Not hard at all   Food Insecurity: No Food Insecurity (2025)    Nursing - Inadequate Food Risk Classification     Worried About Running Out of Food in the Last Year: Never true     Ran Out of Food in the Last Year: Never true     Ran Out of Food in the Last Year: Never true   Transportation Needs: No Transportation Needs (2025)    Nursing - Transportation Risk Classification     Lack of Transportation: No   Intimate Partner Violence: Unknown (2025)    Nursing IPS     Physically Hurt by Someone: No     Hurt or Threatened by Someone: No   Housing  Stability: Unknown (2025)    Nursing: Inadequate Housing Risk Classification     Unable to Pay for Housing in the Last Year: No     Has Housin

## 2025-05-22 NOTE — PROGRESS NOTES
Progress Note - Hospitalist   Name: Marisol Otoole 84 y.o. female I MRN: [id number]  Unit/Bed#: 2 E 257-01 I Date of Admission: 5/20/2025   Date of Service: 5/22/2025 I Hospital Day: 2    Assessment & Plan  Acute cystitis without hematuria    Follow-up with urine cultures.  Patient's fever trend has improved.  Ceftriaxone 2 g daily.  Now with gram-negative bacteremia.  Follow-up with final cultures.  Klebsiella in the urine  Pyelitis  Continue Rocephin  Fall  Fall at home, consult PT OT/case management  ERIN (acute kidney injury) (HCC)  I have held her Lasix,  Continue IV fluids.  Could be secondary to ATN contrast-induced.  Nephrology consultation appreciated  Chronic heart failure with preserved ejection fraction (HFpEF) (HCC)  Continue atenolol, lasix held due to ERIN    Wt Readings from Last 3 Encounters:   05/12/25 77.1 kg (170 lb)   05/09/25 77.1 kg (170 lb)   05/08/25 78.9 kg (174 lb)     Paroxysmal atrial fibrillation (HCC)  Continue atenolol and Coumadin  Still with supratherapeutic INR  GERD (gastroesophageal reflux disease)  Continue PPI  Acquired hypothyroidism  Continue levothyroxine  Primary hypertension  Continue Tylenol  Hyperlipidemia  Continue statin  S/P placement of cardiac pacemaker  Secondary SSS s/p MDT PPM   COPD, mild (HCC)    JANICE on CPAP  Cpap   Morbid obesity due to excess calories (HCC)  BMI 40.99    VTE Pharmacologic Prophylaxis: VTE Score: 7 High Risk (Score >/= 5) - Pharmacological DVT Prophylaxis Ordered: warfarin (Coumadin). Sequential Compression Devices Ordered.    Mobility:   Basic Mobility Inpatient Raw Score: 12  JH-HLM Goal: 4: Move to chair/commode  JH-HLM Achieved: 6: Walk 10 steps or more  JH-HLM Goal achieved. Continue to encourage appropriate mobility.    Patient Centered Rounds: I performed bedside rounds with nursing staff today.   Discussions with Specialists or Other Care Team Provider: Case management    Education and Discussions with Family / Patient: Updated contact  person (friend) via phone.    Current Length of Stay: 2 day(s)  Current Patient Status: Inpatient   Certification Statement: The patient will continue to require additional inpatient hospital stay due to bacteremia  Discharge Plan: Anticipate discharge in 48-72 hrs to home.    Code Status: Level 1 - Full Code    Subjective   Patient seen and examined.    Objective :  Temp:  [97.9 °F (36.6 °C)-99.2 °F (37.3 °C)] 98.7 °F (37.1 °C)  HR:  [58-67] 60  BP: ()/(50-65) 136/65  Resp:  [18-22] 18  SpO2:  [93 %-97 %] 95 %  O2 Device: CPAP  Nasal Cannula O2 Flow Rate (L/min):  [4 L/min] 4 L/min    There is no height or weight on file to calculate BMI.     Input and Output Summary (last 24 hours):     Intake/Output Summary (Last 24 hours) at 5/22/2025 1245  Last data filed at 5/22/2025 1201  Gross per 24 hour   Intake 582 ml   Output 500 ml   Net 82 ml       Physical Exam  General Appearance:    Alert, cooperative, no distress, appears stated age                               Lungs:     Clear to auscultation bilaterally, respirations unlabored       Heart:    Regular rate and rhythm, S1 and S2 normal, no murmur, rub    or gallop   Abdomen:     Soft, non-tender, bowel sounds active all four quadrants,     no masses, no organomegaly           Extremities:   Extremities normal, atraumatic, no cyanosis or edema                         Lines/Drains:              Lab Results: I have reviewed the following results:   Results from last 7 days   Lab Units 05/22/25 0551 05/20/25  1301 05/20/25  0133   WBC Thousand/uL 9.14   < > 12.65*   HEMOGLOBIN g/dL 8.7*   < > 9.3*   HEMATOCRIT % 29.1*   < > 30.3*   PLATELETS Thousands/uL 270   < > 318   SEGS PCT %  --   --  82*   LYMPHO PCT %  --   --  6*   MONO PCT %  --   --  10   EOS PCT %  --   --  1    < > = values in this interval not displayed.     Results from last 7 days   Lab Units 05/22/25  0551 05/20/25  1301 05/20/25  0133   SODIUM mmol/L 138   < > 136   POTASSIUM mmol/L 4.0   < >  4.7   CHLORIDE mmol/L 109*   < > 105   CO2 mmol/L 22   < > 22   BUN mg/dL 48*   < > 42*   CREATININE mg/dL 2.09*   < > 1.77*   ANION GAP mmol/L 7   < > 9   CALCIUM mg/dL 8.7   < > 9.2   ALBUMIN g/dL  --   --  3.4*   TOTAL BILIRUBIN mg/dL  --   --  0.39   ALK PHOS U/L  --   --  90   ALT U/L  --   --  20   AST U/L  --   --  32   GLUCOSE RANDOM mg/dL 109   < > 139    < > = values in this interval not displayed.     Results from last 7 days   Lab Units 05/22/25  0551   INR  2.83*             Results from last 7 days   Lab Units 05/20/25  0321   LACTIC ACID mmol/L 0.7       Recent Cultures (last 7 days):   Results from last 7 days   Lab Units 05/20/25  0325 05/20/25  0323 05/20/25  0321   BLOOD CULTURE  No Growth at 48 hrs.  --  Klebsiella pneumoniae*   GRAM STAIN RESULT   --   --  Gram negative rods*   URINE CULTURE   --  >100,000 cfu/ml Klebsiella pneumoniae*  <10,000 cfu/ml Diphtheroids  --        Imaging Results Review: No pertinent imaging studies reviewed.  Other Study Results Review: No additional pertinent studies reviewed.    Last 24 Hours Medication List:     Current Facility-Administered Medications:     acetaminophen (TYLENOL) tablet 650 mg, Q6H PRN    [START ON 5/23/2025] atenolol (TENORMIN) tablet 25 mg, Daily    cefTRIAXone (ROCEPHIN) 2,000 mg in dextrose 5 % 50 mL IVPB, Q24H, Last Rate: 2,000 mg (05/21/25 4011)    levothyroxine tablet 50 mcg, Early Morning    multi-electrolyte (Plasmalyte-A/Isolyte-S PH 7.4/Normosol-R) IV solution, Continuous, Last Rate: 75 mL/hr (05/22/25 5350)    oxybutynin (DITROPAN-XL) 24 hr tablet 5 mg, Daily    pantoprazole (PROTONIX) EC tablet 40 mg, Early Morning    pravastatin (PRAVACHOL) tablet 80 mg, Daily With Dinner    warfarin (COUMADIN) tablet 2.5 mg, Daily (warfarin)    Administrative Statements   Today, Patient Was Seen By: Cristian Escalante MD  I have spent a total time of 25 minutes in caring for this patient on the day of the visit/encounter including Diagnostic  results, Risks and benefits of tx options, Instructions for management, Patient and family education, Importance of tx compliance, Risk factor reductions, Impressions, Counseling / Coordination of care, Documenting in the medical record, Reviewing/placing orders in the medical record (including tests, medications, and/or procedures), Obtaining or reviewing history  , and Communicating with other healthcare professionals .    **Please Note: This note may have been constructed using a voice recognition system.**

## 2025-05-22 NOTE — ASSESSMENT & PLAN NOTE
Wt Readings from Last 3 Encounters:   05/12/25 77.1 kg (170 lb)   05/09/25 77.1 kg (170 lb)   05/08/25 78.9 kg (174 lb)     Baseline serum creatinine of 0.8 mg/dL  Presented with a fall and found to have creatinine of 1.77 mg/dL on admission  Etiology likely volume depletion plus presence of a ascending urinary tract infection.  Cannot rule out presence of septic ATN in the setting of blood cultures growing gram-negative rods.  Urinalysis had revealed the presence of hematuria and proteinuria.  Creatinine obtained today is 2.0 mg/dL and worse.  Likely cause of worsening appears to be use of contrast with CT scan on the day of arrival resulting in renal vasoconstriction  Expect renal parameters to worsen.  Initiate patient on gentle hydration with Plasma-Lyte.  Will obtain renal Ultrasound with bladder scan pre and postvoid.

## 2025-05-22 NOTE — ASSESSMENT & PLAN NOTE
Follow-up with urine cultures.  Patient's fever trend has improved.  Ceftriaxone 2 g daily.  Now with gram-negative bacteremia.  Follow-up with final cultures.  Klebsiella in the urine

## 2025-05-22 NOTE — ASSESSMENT & PLAN NOTE
Noted to have presence of cystitis on imaging along with pyelitis with urine culture growing Klebsiella pneumonia.  Furthermore blood cultures are now growing gram-negative rods.  Remains on IV ceftriaxone.

## 2025-05-23 ENCOUNTER — APPOINTMENT (INPATIENT)
Dept: RADIOLOGY | Facility: HOSPITAL | Age: 85
DRG: 871 | End: 2025-05-23
Payer: MEDICARE

## 2025-05-23 PROBLEM — D64.9 ANEMIA: Status: ACTIVE | Noted: 2025-05-23

## 2025-05-23 LAB
ANION GAP SERPL CALCULATED.3IONS-SCNC: 8 MMOL/L (ref 4–13)
BACTERIA BLD CULT: ABNORMAL
BUN SERPL-MCNC: 47 MG/DL (ref 5–25)
CALCIUM SERPL-MCNC: 9 MG/DL (ref 8.4–10.2)
CHLORIDE SERPL-SCNC: 108 MMOL/L (ref 96–108)
CO2 SERPL-SCNC: 23 MMOL/L (ref 21–32)
CREAT SERPL-MCNC: 2.01 MG/DL (ref 0.6–1.3)
GFR SERPL CREATININE-BSD FRML MDRD: 22 ML/MIN/1.73SQ M
GLUCOSE SERPL-MCNC: 93 MG/DL (ref 65–140)
GRAM STN SPEC: ABNORMAL
INR PPP: 2.47 (ref 0.85–1.19)
KLEBSIELLA SP DNA BLD POS QL NAA+NON-PRB: DETECTED
POTASSIUM SERPL-SCNC: 4.3 MMOL/L (ref 3.5–5.3)
PROTHROMBIN TIME: 27.4 SECONDS (ref 12.3–15)
SODIUM SERPL-SCNC: 139 MMOL/L (ref 135–147)

## 2025-05-23 PROCEDURE — 85610 PROTHROMBIN TIME: CPT | Performed by: FAMILY MEDICINE

## 2025-05-23 PROCEDURE — 99232 SBSQ HOSP IP/OBS MODERATE 35: CPT | Performed by: FAMILY MEDICINE

## 2025-05-23 PROCEDURE — 97530 THERAPEUTIC ACTIVITIES: CPT

## 2025-05-23 PROCEDURE — 97535 SELF CARE MNGMENT TRAINING: CPT

## 2025-05-23 PROCEDURE — 97116 GAIT TRAINING THERAPY: CPT

## 2025-05-23 PROCEDURE — 94660 CPAP INITIATION&MGMT: CPT

## 2025-05-23 PROCEDURE — 99232 SBSQ HOSP IP/OBS MODERATE 35: CPT | Performed by: INTERNAL MEDICINE

## 2025-05-23 PROCEDURE — 94760 N-INVAS EAR/PLS OXIMETRY 1: CPT

## 2025-05-23 PROCEDURE — 80048 BASIC METABOLIC PNL TOTAL CA: CPT | Performed by: INTERNAL MEDICINE

## 2025-05-23 PROCEDURE — 71045 X-RAY EXAM CHEST 1 VIEW: CPT

## 2025-05-23 RX ORDER — CEFAZOLIN SODIUM 1 G/50ML
1000 SOLUTION INTRAVENOUS EVERY 8 HOURS
Status: DISCONTINUED | OUTPATIENT
Start: 2025-05-23 | End: 2025-05-26 | Stop reason: HOSPADM

## 2025-05-23 RX ADMIN — CEFAZOLIN SODIUM 1000 MG: 1 SOLUTION INTRAVENOUS at 23:35

## 2025-05-23 RX ADMIN — CEFAZOLIN SODIUM 1000 MG: 1 SOLUTION INTRAVENOUS at 15:58

## 2025-05-23 RX ADMIN — LEVOTHYROXINE SODIUM 50 MCG: 0.05 TABLET ORAL at 06:16

## 2025-05-23 RX ADMIN — PRAVASTATIN SODIUM 80 MG: 80 TABLET ORAL at 16:59

## 2025-05-23 RX ADMIN — PANTOPRAZOLE SODIUM 40 MG: 40 TABLET, DELAYED RELEASE ORAL at 06:16

## 2025-05-23 RX ADMIN — WARFARIN SODIUM 2.5 MG: 2.5 TABLET ORAL at 17:00

## 2025-05-23 RX ADMIN — ACETAMINOPHEN 650 MG: 325 TABLET ORAL at 18:42

## 2025-05-23 RX ADMIN — OXYBUTYNIN CHLORIDE 5 MG: 5 TABLET, EXTENDED RELEASE ORAL at 11:02

## 2025-05-23 RX ADMIN — ATENOLOL 25 MG: 25 TABLET ORAL at 11:02

## 2025-05-23 RX ADMIN — CEFAZOLIN SODIUM 1000 MG: 1 SOLUTION INTRAVENOUS at 11:02

## 2025-05-23 NOTE — PLAN OF CARE
Problem: Potential for Falls  Goal: Patient will remain free of falls  Description: INTERVENTIONS:  - Educate patient/family on patient safety including physical limitations  - Instruct patient to call for assistance with activity   - Consider consulting OT/PT to assist with strengthening/mobility based on AM PAC & JH-HLM score  - Consult OT/PT to assist with strengthening/mobility   - Keep Call bell within reach  - Keep bed low and locked with side rails adjusted as appropriate  - Keep care items and personal belongings within reach  - Initiate and maintain comfort rounds  - Make Fall Risk Sign visible to staff  - Offer Toileting every 2 Hours, in advance of need  - Initiate/Maintain  24/7 alarm  - Obtain necessary fall risk management equipment: bed alarm  - Apply yellow socks and bracelet for high fall risk patients  - Consider moving patient to room near nurses station  Outcome: Progressing     Problem: PAIN - ADULT  Goal: Verbalizes/displays adequate comfort level or baseline comfort level  Description: Interventions:  - Encourage patient to monitor pain and request assistance  - Assess pain using appropriate pain scale  - Administer analgesics as ordered based on type and severity of pain and evaluate response  - Implement non-pharmacological measures as appropriate and evaluate response  - Consider cultural and social influences on pain and pain management  - Notify physician/advanced practitioner if interventions unsuccessful or patient reports new pain  - Educate patient/family on pain management process including their role and importance of  reporting pain   - Provide non-pharmacologic/complimentary pain relief interventions  Outcome: Progressing     Problem: INFECTION - ADULT  Goal: Absence or prevention of progression during hospitalization  Description: INTERVENTIONS:  - Assess and monitor for signs and symptoms of infection  - Monitor lab/diagnostic results  - Monitor all insertion sites, i.e.  indwelling lines, tubes, and drains  - Monitor endotracheal if appropriate and nasal secretions for changes in amount and color  - [place] appropriate cooling/warming therapies per order  - Administer medications as ordered  - Instruct and encourage patient and family to use good hand hygiene technique  - Identify and instruct in appropriate isolation precautions for identified infection/condition  Outcome: Progressing

## 2025-05-23 NOTE — ASSESSMENT & PLAN NOTE
Follow-up with urine cultures.  Patient's fever trend has improved.  Ceftriaxone 2 g daily.  Now with gram-negative bacteremia.  Follow-up with final cultures.  Klebsiella in the urine.  Sepsis resolved.

## 2025-05-23 NOTE — PROGRESS NOTES
NEPHROLOGY PROGRESS NOTE    Patient: Marisol Otoole               Sex: female          DOA: 5/20/2025  1:14 AM   YOB: 1940        Age: 84 y.o.         LOS:  LOS: 3 days   Encounter Date: 5/23/2025    REASON FOR THE CONSULTATION: Further management of ERIN    ASSESSMENT / PLAN     Assessment & Plan  ERIN (acute kidney injury) (HCC)    Multifactorial, present on admission, suspected due to ATN in the setting of underlying sepsis.  Patient also had underwent CT scan with IV contrast on 5/20/2025 and underlying contrast-induced nephropathy on top of ATN cannot be ruled out.    Upon epic chart review, previously known baseline creatinine was thought to be around 0.6-0.9.  Admission creatinine was 1.77.  Renal function has worsened during the hospital stay but looking at overall renal function trend, seems to me that patient's peak creatinine level was 2.09 [5/22/2025].  Currently receiving IV fluid and overnight found to be nonoliguric.  Renal function overnight has improved to creatinine of 2.01.  Renal ultrasound done on 5/22/2025 showed bilateral simple cyst without hydronephrosis.  Recommend to continue IV fluid and monitor renal function while in the hospital.    Urine analysis done on 5/20/2025 showed hematuria with numerous WBC.  Urine culture grew Klebsiella pneumoniae and suspect hematuria due to underlying infection.  Since renal function has improved overnight, suspicious for underlying glomerulonephritis is low.    Patient is currently receiving ceftriaxone for management of acute cystitis and defer further management of antibiotic to the primary team.  Primary hypertension    Blood pressure is currently acceptable and plan to monitor hypertension with atenolol 25 mg p.o. daily.  Anemia    Last known hemoglobin level is 8.7 from yesterday.  No active signs of bleeding seen overnight.  Recommend monitoring hemoglobin level 1 in the hospital and consider blood transfusion if hemoglobin level drops  below 7.    Overall above mentioned plan and recommendations were also d/w current [facility] physician and they agreed with my plan of monitoring renal function with IV fluid today    Anival Escalante MD  Nephrology  5/23/2025    HPI     This is a 84 y.o. female admitted for Acute cystitis without hematuria     SUBJECTIVE     - Patient was seen earlier this morning.  Patient was using CPAP, easily arousable.  Patient denies nausea, vomiting, headache or dizziness today.  - Reviewed last 24 hrs events     CURRENT MEDICATIONS       Current Facility-Administered Medications:     acetaminophen (TYLENOL) tablet 650 mg, 650 mg, Oral, Q6H PRN, Cristian Escalante MD, 650 mg at 05/22/25 2205    atenolol (TENORMIN) tablet 25 mg, 25 mg, Oral, Daily, Wilfred Wolf MD    ceFAZolin (ANCEF) IVPB (premix in dextrose) 1,000 mg 50 mL, 1,000 mg, Intravenous, Q8H, Cristian Escalante MD    levothyroxine tablet 50 mcg, 50 mcg, Oral, Early Morning, Fernando Escalante MD, 50 mcg at 05/23/25 0616    multi-electrolyte (Plasmalyte-A/Isolyte-S PH 7.4/Normosol-R) IV solution, 75 mL/hr, Intravenous, Continuous, Wilfred Wolf MD, Last Rate: 75 mL/hr at 05/22/25 0946, 75 mL/hr at 05/22/25 0946    oxybutynin (DITROPAN-XL) 24 hr tablet 5 mg, 5 mg, Oral, Daily, MABEL Israel, 5 mg at 05/22/25 0844    pantoprazole (PROTONIX) EC tablet 40 mg, 40 mg, Oral, Early Morning, Fernando Escalante MD, 40 mg at 05/23/25 0616    pravastatin (PRAVACHOL) tablet 80 mg, 80 mg, Oral, Daily With Dinner, Fernando Escalante MD, 80 mg at 05/22/25 1700    warfarin (COUMADIN) tablet 2.5 mg, 2.5 mg, Oral, Daily (warfarin), Cristian Escalante MD, 2.5 mg at 05/22/25 1700    OBJECTIVE     Current Weight:    /56   Pulse 61   Temp 98.3 °F (36.8 °C) (Oral)   Resp (!) 24   SpO2 98%   Vitals:    05/23/25 0000   BP: 128/56   Pulse: 61   Resp: (!) 24   Temp: 98.3 °F (36.8 °C)   SpO2: 98%     There is no height or weight on file to calculate  BMI.    Intake/Output Summary (Last 24 hours) at 5/23/2025 0853  Last data filed at 5/22/2025 2000  Gross per 24 hour   Intake 720 ml   Output 700 ml   Net 20 ml       PHYSICAL EXAMINATION     Physical Exam  Constitutional:       General: She is not in acute distress.  HENT:      Right Ear: External ear normal.     Eyes:      Conjunctiva/sclera:      Right eye: No hemorrhage.    Neck:      Thyroid: No thyromegaly.   Pulmonary:      Effort: No accessory muscle usage or respiratory distress.   Abdominal:      General: There is no distension.     Skin:     Coloration: Skin is not jaundiced.     Psychiatric:         Behavior: Behavior is not combative.           LAB RESULTS     Results from last 7 days   Lab Units 05/23/25  0555 05/22/25  0551 05/21/25  0624 05/20/25  1301 05/20/25  0133   WBC Thousand/uL  --  9.14 10.96* 14.18* 12.65*   HEMOGLOBIN g/dL  --  8.7* 8.6* 9.5* 9.3*   HEMATOCRIT %  --  29.1* 27.7* 31.9* 30.3*   PLATELETS Thousands/uL  --  270 256 324 318   SODIUM mmol/L 139 138 136 138 136   POTASSIUM mmol/L 4.3 4.0 4.1 4.5 4.7   CHLORIDE mmol/L 108 109* 108 107 105   CO2 mmol/L 23 22 21 24 22   BUN mg/dL 47* 48* 44* 37* 42*   CREATININE mg/dL 2.01* 2.09* 1.92* 1.54* 1.77*   EGFR ml/min/1.73sq m 22 21 23 30 25   CALCIUM mg/dL 9.0 8.7 8.7 9.3 9.2       RADIOLOGY RESULTS      US kidney and bladder   Final Result by Fernando Vásquez MD (05/22 1313)      Bilateral simple renal cysts. No hydronephrosis.      Bladder not visualized.            Workstation performed: [id number]         TRAUMA - CT head wo contrast   Final Result by Jose Guadalupe Little MD (05/20 0229)      No intracranial hemorrhage or calvarial fracture.      Small right temporoparietal scalp contusion.            Workstation performed: [id number]         TRAUMA - CT spine cervical wo contrast   Final Result by Jose Guadalupe Little MD (05/20 3016)      No cervical spine fracture or traumatic malalignment.                  Workstation performed: [id number]        "  TRAUMA - CT chest abdomen pelvis w contrast   Final Result by Jose Guadalupe Little MD (05/20 0255)      No findings of acute traumatic injury in the chest, abdomen or pelvis.      No displaced fracture identified.      Question cystitis with ascending urinary tract infection on the left and concomitant pyelitis. Clinical correlation recommended.      Additional stable chronic/nonemergent findings as above.         Workstation performed: [id number]         XR Trauma chest portable   Final Result by Dajuan Julien DO (05/20 0255)      No acute cardiopulmonary disease.      Other findings as above. Correlation with pending trauma CTs recommended.      Workstation performed: [id number]             Portions of the record may have been created with voice recognition software. Occasional wrong word or \"sound a like\" substitutions may have occurred due to the inherent limitations of voice recognition software. Read the chart carefully and recognize, using context, where substitutions have occurred.  "

## 2025-05-23 NOTE — PROGRESS NOTES
Progress Note - Hospitalist   Name: Marisol Otoole 84 y.o. female I MRN: [id number]  Unit/Bed#: 2 E 257-01 I Date of Admission: 5/20/2025   Date of Service: 5/23/2025 I Hospital Day: 3    Assessment & Plan  Acute cystitis without hematuria    Follow-up with urine cultures.  Patient's fever trend has improved.  Ceftriaxone 2 g daily.  Now with gram-negative bacteremia.  Follow-up with final cultures.  Klebsiella in the urine.  Sepsis resolved.  Pyelitis  Continue Rocephin  Fall  Fall at home, consult PT OT/case management  ERIN (acute kidney injury) (HCC)  I have held her Lasix,  Continue IV fluids.  Could be secondary to ATN contrast-induced.  Nephrology consultation appreciated  Chronic heart failure with preserved ejection fraction (HFpEF) (HCC)  Continue atenolol, lasix held due to ERIN    Wt Readings from Last 3 Encounters:   05/12/25 77.1 kg (170 lb)   05/09/25 77.1 kg (170 lb)   05/08/25 78.9 kg (174 lb)     Paroxysmal atrial fibrillation (HCC)  Continue atenolol and Coumadin  Still with supratherapeutic INR  GERD (gastroesophageal reflux disease)  Continue PPI  Acquired hypothyroidism  Continue levothyroxine  Primary hypertension  Continue Tylenol  Hyperlipidemia  Continue statin  S/P placement of cardiac pacemaker  Secondary SSS s/p MDT PPM   COPD, mild (HCC)    JANICE on CPAP  Cpap   Morbid obesity due to excess calories (HCC)  BMI 40.99  Anemia      VTE Pharmacologic Prophylaxis: VTE Score: 7 High Risk (Score >/= 5) - Pharmacological DVT Prophylaxis Ordered: warfarin (Coumadin). Sequential Compression Devices Ordered.    Mobility:   Basic Mobility Inpatient Raw Score: 12  JH-HLM Goal: 4: Move to chair/commode  JH-HLM Achieved: 6: Walk 10 steps or more  JH-HLM Goal achieved. Continue to encourage appropriate mobility.    Patient Centered Rounds: I performed bedside rounds with nursing staff today.   Discussions with Specialists or Other Care Team Provider: Case management    Education and Discussions with Family  / Patient: Patient.     Current Length of Stay: 3 day(s)  Current Patient Status: Inpatient   Certification Statement: The patient will continue to require additional inpatient hospital stay due to needingto make sure creatinine improves.  Follow-up with the bacteremia and sensitivities  Discharge Plan: Anticipate discharge in 24-48 hrs to home.    Code Status: Level 1 - Full Code    Subjective   Patient seen and examined.  No acute event    Objective :  Temp:  [98.3 °F (36.8 °C)] 98.3 °F (36.8 °C)  HR:  [60-61] 60  BP: (119-136)/(54-63) 136/63  Resp:  [18-32] 24  SpO2:  [84 %-99 %] 96 %  O2 Device: None (Room air)  Nasal Cannula O2 Flow Rate (L/min):  [4 L/min] 4 L/min    There is no height or weight on file to calculate BMI.     Input and Output Summary (last 24 hours):     Intake/Output Summary (Last 24 hours) at 5/23/2025 1422  Last data filed at 5/22/2025 2000  Gross per 24 hour   Intake 480 ml   Output 200 ml   Net 280 ml       Physical Exam  General Appearance:    Alert, cooperative, no distress, appears stated age                               Lungs:     Clear to auscultation bilaterally, respirations unlabored       Heart:    Regular rate and rhythm, S1 and S2 normal, no murmur, rub    or gallop   Abdomen:     Soft, non-tender, bowel sounds active all four quadrants,     no masses, no organomegaly           Extremities:   Extremities normal, atraumatic, no cyanosis or edema                         Lines/Drains:              Lab Results: I have reviewed the following results:   Results from last 7 days   Lab Units 05/22/25  0551 05/20/25  1301 05/20/25  0133   WBC Thousand/uL 9.14   < > 12.65*   HEMOGLOBIN g/dL 8.7*   < > 9.3*   HEMATOCRIT % 29.1*   < > 30.3*   PLATELETS Thousands/uL 270   < > 318   SEGS PCT %  --   --  82*   LYMPHO PCT %  --   --  6*   MONO PCT %  --   --  10   EOS PCT %  --   --  1    < > = values in this interval not displayed.     Results from last 7 days   Lab Units 05/23/25  0555  05/20/25  1301 05/20/25  0133   SODIUM mmol/L 139   < > 136   POTASSIUM mmol/L 4.3   < > 4.7   CHLORIDE mmol/L 108   < > 105   CO2 mmol/L 23   < > 22   BUN mg/dL 47*   < > 42*   CREATININE mg/dL 2.01*   < > 1.77*   ANION GAP mmol/L 8   < > 9   CALCIUM mg/dL 9.0   < > 9.2   ALBUMIN g/dL  --   --  3.4*   TOTAL BILIRUBIN mg/dL  --   --  0.39   ALK PHOS U/L  --   --  90   ALT U/L  --   --  20   AST U/L  --   --  32   GLUCOSE RANDOM mg/dL 93   < > 139    < > = values in this interval not displayed.     Results from last 7 days   Lab Units 05/23/25  0555   INR  2.47*             Results from last 7 days   Lab Units 05/20/25  0321   LACTIC ACID mmol/L 0.7       Recent Cultures (last 7 days):   Results from last 7 days   Lab Units 05/20/25  0325 05/20/25  0323 05/20/25  0321   BLOOD CULTURE  No Growth at 72 hrs.  --  Klebsiella pneumoniae*   GRAM STAIN RESULT   --   --  Gram negative rods*   URINE CULTURE   --  >100,000 cfu/ml Klebsiella pneumoniae*  <10,000 cfu/ml Diphtheroids  --        Imaging Results Review: No pertinent imaging studies reviewed.  Other Study Results Review: No additional pertinent studies reviewed.    Last 24 Hours Medication List:     Current Facility-Administered Medications:     acetaminophen (TYLENOL) tablet 650 mg, Q6H PRN    atenolol (TENORMIN) tablet 25 mg, Daily    ceFAZolin (ANCEF) IVPB (premix in dextrose) 1,000 mg 50 mL, Q8H, Last Rate: 1,000 mg (05/23/25 1102)    levothyroxine tablet 50 mcg, Early Morning    multi-electrolyte (Plasmalyte-A/Isolyte-S PH 7.4/Normosol-R) IV solution, Continuous, Last Rate: 75 mL/hr (05/22/25 0946)    oxybutynin (DITROPAN-XL) 24 hr tablet 5 mg, Daily    pantoprazole (PROTONIX) EC tablet 40 mg, Early Morning    pravastatin (PRAVACHOL) tablet 80 mg, Daily With Dinner    warfarin (COUMADIN) tablet 2.5 mg, Daily (warfarin)    Administrative Statements   Today, Patient Was Seen By: Cristian Escalante MD  I have spent a total time of 25 minutes in caring for this  patient on the day of the visit/encounter including Diagnostic results, Risks and benefits of tx options, Instructions for management, Patient and family education, Importance of tx compliance, Risk factor reductions, Impressions, Counseling / Coordination of care, Documenting in the medical record, Obtaining or reviewing history  , and Communicating with other healthcare professionals .    **Please Note: This note may have been constructed using a voice recognition system.**

## 2025-05-23 NOTE — ASSESSMENT & PLAN NOTE
Blood pressure is currently acceptable and plan to monitor hypertension with atenolol 25 mg p.o. daily.

## 2025-05-23 NOTE — PLAN OF CARE
Problem: PHYSICAL THERAPY ADULT  Goal: Performs mobility at highest level of function for planned discharge setting.  See evaluation for individualized goals.  Description: Treatment/Interventions: Functional transfer training, LE strengthening/ROM, Therapeutic exercise, Endurance training, Patient/family training, Bed mobility, Gait training, Spoke to nursing, OT          See flowsheet documentation for full assessment, interventions and recommendations.  Outcome: Progressing  Note: Prognosis: Good  Problem List: Decreased strength, Decreased endurance, Impaired balance, Decreased mobility, Pain  Assessment: Chart reviewed. Patient was received seated in recliner in NAD and agreeable to PT session. Today's PT treatment session consisted of therapeutic activity for facilitation of transitional movements and safe performance of correct technique for sit to stand transfers and gait training to promote safe and functional ambulation on level surfaces. In comparison to the previous session the patient has made progress as evident by ability to ambulate an increased distance. Pt also ambulated two gait trials this session with a seated rest break between each ambulation trial. When ambulating pt exhibits decreased yemi and decreased stride length. Pt ambulated on 4L O2 via NC; SpO2 was stable throughout the session. Overall, patient tolerated today's session well and continues to be making progress towards achieving her STG's. Patient's prognosis for achieving her STG's is good as evident by pt's motivation. PT intervention continues to be appropriate as the patient continues to be limited by pain, decreased lower extremity strength, impaired balance, decreased endurance, gait deviations, and decreased functional mobility. Continue to recommend level 2, moderate resource intensity. PT to continue to see patient in order to address the deficits listed above and provide interventions consistent with the POC in order  to achieve STG's and optimize the patient's independence with functional mobility.    Barriers to Discharge: Decreased caregiver support, Other (Comment) (decline in functional mobility)     Rehab Resource Intensity Level, PT: II (Moderate Resource Intensity)    See flowsheet documentation for full assessment.

## 2025-05-23 NOTE — PLAN OF CARE
Problem: OCCUPATIONAL THERAPY ADULT  Goal: Performs self-care activities at highest level of function for planned discharge setting.  See evaluation for individualized goals.  Description: Treatment Interventions: ADL retraining, Functional transfer training, UE strengthening/ROM, Endurance training, Patient/family training, Equipment evaluation/education, Compensatory technique education, Continued evaluation, Energy conservation, Activityengagement          See flowsheet documentation for full assessment, interventions and recommendations.   Note: Limitation: Decreased ADL status, Decreased UE strength, Decreased UE ROM, Decreased Safe judgement during ADL, Decreased endurance, Decreased self-care trans, Decreased high-level ADLs  Prognosis: Good  Assessment: Patient participated in Skilled OT session this date with interventions consisting of ADL re training with the use of correct body mechnaics, Energy Conservation techniques, Work simplification skills , safety awareness and fall prevention techniques,  therapeutic activities to: increase activity tolerance, increase standing tolerance time with unilateral UE support to complete sink level ADLs, increase cardiovascular endurance , increase dynamic sit/ stand balance during functional activity , increase postural control, and increase OOB/ sitting tolerance . Patient agreeable to OT treatment session, upon arrival patient was found seated OOB to Recliner, alert, responsive , and in no apparent distress.  In comparison to previous session, patient with improvements in overall endurance, dynamic sitting and standing balance,  completed self care routine while sitting sink side, required min assist for UB ADLs, Max assist for LB ADLs. Patient requiring verbal cues for safety, verbal cues for correct technique, verbal cues for pacing thru activity steps, and frequent rest periods. Patient continues to be functioning below baseline level, occupational performance  remains limited secondary to factors listed above and increased risk for falls and injury.   From OT standpoint, recommendation at time of d/c would be Level 2.   Patient to benefit from continued Occupational Therapy treatment while in the hospital to address deficits as defined above and maximize level of functional independence with ADLs and functional mobility.     Rehab Resource Intensity Level, OT: II (Moderate Resource Intensity)

## 2025-05-23 NOTE — PLAN OF CARE
Problem: Potential for Falls  Goal: Patient will remain free of falls  Description: INTERVENTIONS:  - Educate patient/family on patient safety including physical limitations  - Instruct patient to call for assistance with activity   - Consider consulting OT/PT to assist with strengthening/mobility based on AM PAC & JH-HLM score  - Consult OT/PT to assist with strengthening/mobility   - Keep Call bell within reach  - Keep bed low and locked with side rails adjusted as appropriate  - Keep care items and personal belongings within reach  - Initiate and maintain comfort rounds  - Make Fall Risk Sign visible to staff  - Offer Toileting every 2 Hours, in advance of need  - Initiate/Maintain 2alarm  - Obtain necessary fall risk management equipment: 2  - Apply yellow socks and bracelet for high fall risk patients  - Consider moving patient to room near nurses station  Outcome: Progressing     Problem: PAIN - ADULT  Goal: Verbalizes/displays adequate comfort level or baseline comfort level  Description: Interventions:  - Encourage patient to monitor pain and request assistance  - Assess pain using appropriate pain scale  - Administer analgesics as ordered based on type and severity of pain and evaluate response  - Implement non-pharmacological measures as appropriate and evaluate response  - Consider cultural and social influences on pain and pain management  - Notify physician/advanced practitioner if interventions unsuccessful or patient reports new pain  - Educate patient/family on pain management process including their role and importance of  reporting pain   - Provide non-pharmacologic/complimentary pain relief interventions  Outcome: Progressing     Problem: INFECTION - ADULT  Goal: Absence or prevention of progression during hospitalization  Description: INTERVENTIONS:  - Assess and monitor for signs and symptoms of infection  - Monitor lab/diagnostic results  - Monitor all insertion sites, i.e. indwelling lines,  tubes, and drains  - Monitor endotracheal if appropriate and nasal secretions for changes in amount and color  - [place] appropriate cooling/warming therapies per order  - Administer medications as ordered  - Instruct and encourage patient and family to use good hand hygiene technique  - Identify and instruct in appropriate isolation precautions for identified infection/condition  Outcome: Progressing  Goal: Absence of fever/infection during neutropenic period  Description: INTERVENTIONS:  - Monitor WBC  - Perform strict hand hygiene  - Limit to healthy visitors only  - No plants, dried, fresh or silk flowers with rooney in patient room  Outcome: Progressing     Problem: SAFETY ADULT  Goal: Patient will remain free of falls  Description: INTERVENTIONS:  - Educate patient/family on patient safety including physical limitations  - Instruct patient to call for assistance with activity   - Consider consulting OT/PT to assist with strengthening/mobility based on AM PAC & JH-HLM score  - Consult OT/PT to assist with strengthening/mobility   - Keep Call bell within reach  - Keep bed low and locked with side rails adjusted as appropriate  - Keep care items and personal belongings within reach  - Initiate and maintain comfort rounds  - Make Fall Risk Sign visible to staff  - Offer Toileting every 2 Hours, in advance of need  - Initiate/Maintain 2alarm  - Obtain necessary fall risk management equipment: 2  - Apply yellow socks and bracelet for high fall risk patients  - Consider moving patient to room near nurses station  Outcome: Progressing  Goal: Maintain or return to baseline ADL function  Description: INTERVENTIONS:  -  Assess patient's ability to carry out ADLs; assess patient's baseline for ADL function and identify physical deficits which impact ability to perform ADLs (bathing, care of mouth/teeth, toileting, grooming, dressing, etc.)  - Assess/evaluate cause of self-care deficits   - Assess range of motion  - Assess  patient's mobility; develop plan if impaired  - Assess patient's need for assistive devices and provide as appropriate  - Encourage maximum independence but intervene and supervise when necessary  - Involve family in performance of ADLs  - Assess for home care needs following discharge   - Consider OT consult to assist with ADL evaluation and planning for discharge  - Provide patient education as appropriate  - Monitor functional capacity and physical performance, use of AM PAC & JH-HLM   - Monitor gait, balance and fatigue with ambulation    Outcome: Progressing  Goal: Maintains/Returns to pre admission functional level  Description: INTERVENTIONS:  - Perform AM-PAC 6 Click Basic Mobility/ Daily Activity assessment daily.  - Set and communicate daily mobility goal to care team and patient/family/caregiver.   - Collaborate with rehabilitation services on mobility goals if consulted  - Perform Range of Motion 2 times a day.  - Reposition patient every 2 hours.  - Dangle patient 2 times a day  - Stand patient 2 times a day  - Ambulate patient 2 times a day  - Out of bed to chair 2 times a day   - Out of bed for meals 2 times a day  - Out of bed for toileting  - Record patient progress and toleration of activity level   Outcome: Progressing     Problem: DISCHARGE PLANNING  Goal: Discharge to home or other facility with appropriate resources  Description: INTERVENTIONS:  - Identify barriers to discharge w/patient and caregiver  - Arrange for needed discharge resources and transportation as appropriate  - Identify discharge learning needs (meds, wound care, etc.)  - Arrange for interpretive services to assist at discharge as needed  - Refer to Case Management Department for coordinating discharge planning if the patient needs post-hospital services based on physician/advanced practitioner order or complex needs related to functional status, cognitive ability, or social support system  Outcome: Progressing     Problem:  Knowledge Deficit  Goal: Patient/family/caregiver demonstrates understanding of disease process, treatment plan, medications, and discharge instructions  Description: Complete learning assessment and assess knowledge base.  Interventions:  - Provide teaching at level of understanding  - Provide teaching via preferred learning methods  Outcome: Progressing     Problem: NEUROSENSORY - ADULT  Goal: Achieves stable or improved neurological status  Description: INTERVENTIONS  - Monitor and report changes in neurological status  - Monitor vital signs such as temperature, blood pressure, glucose, and any other labs ordered   - Initiate measures to prevent increased intracranial pressure  - Monitor for seizure activity and implement precautions if appropriate      Outcome: Progressing  Goal: Remains free of injury related to seizures activity  Description: INTERVENTIONS  - Maintain airway, patient safety  and administer oxygen as ordered  - Monitor patient for seizure activity, document and report duration and description of seizure to physician/advanced practitioner  - If seizure occurs,  ensure patient safety during seizure  - Reorient patient post seizure  - Seizure pads on all 4 side rails  - Instruct patient/family to notify RN of any seizure activity including if an aura is experienced  - Instruct patient/family to call for assistance with activity based on nursing assessment  - Administer anti-seizure medications if ordered    Outcome: Progressing  Goal: Achieves maximal functionality and self care  Description: INTERVENTIONS  - Monitor swallowing and airway patency with patient fatigue and changes in neurological status  - Encourage and assist patient to increase activity and self care.   - Encourage visually impaired, hearing impaired and aphasic patients to use assistive/communication devices  Outcome: Progressing     Problem: CARDIOVASCULAR - ADULT  Goal: Maintains optimal cardiac output and hemodynamic  stability  Description: INTERVENTIONS:  - Monitor I/O, vital signs and rhythm  - Monitor for S/S and trends of decreased cardiac output  - Administer and titrate ordered vasoactive medications to optimize hemodynamic stability  - Assess quality of pulses, skin color and temperature  - Assess for signs of decreased coronary artery perfusion  - Instruct patient to report change in severity of symptoms  Outcome: Progressing  Goal: Absence of cardiac dysrhythmias or at baseline rhythm  Description: INTERVENTIONS:  - Continuous cardiac monitoring, vital signs, obtain 12 lead EKG if ordered  - Administer antiarrhythmic and heart rate control medications as ordered  - Monitor electrolytes and administer replacement therapy as ordered  Outcome: Progressing     Problem: RESPIRATORY - ADULT  Goal: Achieves optimal ventilation and oxygenation  Description: INTERVENTIONS:  - Assess for changes in respiratory status  - Assess for changes in mentation and behavior  - Position to facilitate oxygenation and minimize respiratory effort  - Oxygen administered by appropriate delivery if ordered  - Initiate smoking cessation education as indicated  - Encourage broncho-pulmonary hygiene including cough, deep breathe, Incentive Spirometry  - Assess the need for suctioning and aspirate as needed  - Assess and instruct to report SOB or any respiratory difficulty  - Respiratory Therapy support as indicated  Outcome: Progressing     Problem: GASTROINTESTINAL - ADULT  Goal: Minimal or absence of nausea and/or vomiting  Description: INTERVENTIONS:  - Administer IV fluids if ordered to ensure adequate hydration  - Maintain NPO status until nausea and vomiting are resolved  - Nasogastric tube if ordered  - Administer ordered antiemetic medications as needed  - Provide nonpharmacologic comfort measures as appropriate  - Advance diet as tolerated, if ordered  - Consider nutrition services referral to assist patient with adequate nutrition and  appropriate food choices  Outcome: Progressing  Goal: Maintains or returns to baseline bowel function  Description: INTERVENTIONS:  - Assess bowel function  - Encourage oral fluids to ensure adequate hydration  - Administer IV fluids if ordered to ensure adequate hydration  - Administer ordered medications as needed  - Encourage mobilization and activity  - Consider nutritional services referral to assist patient with adequate nutrition and appropriate food choices  Outcome: Progressing  Goal: Maintains adequate nutritional intake  Description: INTERVENTIONS:  - Monitor percentage of each meal consumed  - Identify factors contributing to decreased intake, treat as appropriate  - Assist with meals as needed  - Monitor I&O, weight, and lab values if indicated  - Obtain nutrition services referral as needed  Outcome: Progressing  Goal: Establish and maintain optimal ostomy function  Description: INTERVENTIONS:  - Assess bowel function  - Encourage oral fluids to ensure adequate hydration  - Administer IV fluids if ordered to ensure adequate hydration   - Administer ordered medications as needed  - Encourage mobilization and activity  - Nutrition services referral to assist patient with appropriate food choices  - Assess stoma site  - Consider wound care consult   Outcome: Progressing  Goal: Oral mucous membranes remain intact  Description: INTERVENTIONS  - Assess oral mucosa and hygiene practices  - Implement preventative oral hygiene regimen  - Implement oral medicated treatments as ordered  - Initiate Nutrition services referral as needed  Outcome: Progressing     Problem: GENITOURINARY - ADULT  Goal: Maintains or returns to baseline urinary function  Description: INTERVENTIONS:  - Assess urinary function  - Encourage oral fluids to ensure adequate hydration if ordered  - Administer IV fluids as ordered to ensure adequate hydration  - Administer ordered medications as needed  - Offer frequent toileting  - Follow  urinary retention protocol if ordered  Outcome: Progressing  Goal: Absence of urinary retention  Description: INTERVENTIONS:  - Assess patient’s ability to void and empty bladder  - Monitor I/O  - Bladder scan as needed  - Discuss with physician/AP medications to alleviate retention as needed  - Discuss catheterization for long term situations as appropriate  Outcome: Progressing  Goal: Urinary catheter remains patent  Description: INTERVENTIONS:  - Assess patency of urinary catheter  - If patient has a chronic jeff, consider changing catheter if non-functioning  - Follow guidelines for intermittent irrigation of non-functioning urinary catheter  Outcome: Progressing     Problem: METABOLIC, FLUID AND ELECTROLYTES - ADULT  Goal: Electrolytes maintained within normal limits  Description: INTERVENTIONS:  - Monitor labs and assess patient for signs and symptoms of electrolyte imbalances  - Administer electrolyte replacement as ordered  - Monitor response to electrolyte replacements, including repeat lab results as appropriate  - Instruct patient on fluid and nutrition as appropriate  Outcome: Progressing  Goal: Fluid balance maintained  Description: INTERVENTIONS:  - Monitor labs   - Monitor I/O and WT  - Instruct patient on fluid and nutrition as appropriate  - Assess for signs & symptoms of volume excess or deficit  Outcome: Progressing  Goal: Glucose maintained within target range  Description: INTERVENTIONS:  - Monitor Blood Glucose as ordered  - Assess for signs and symptoms of hyperglycemia and hypoglycemia  - Administer ordered medications to maintain glucose within target range  - Assess nutritional intake and initiate nutrition service referral as needed  Outcome: Progressing     Problem: SKIN/TISSUE INTEGRITY - ADULT  Goal: Skin Integrity remains intact(Skin Breakdown Prevention)  Description: Assess:  -Perform Lane assessment every 2  -Clean and moisturize skin every 2  -Inspect skin when repositioning,  toileting, and assisting with ADLS  -Assess under medical devices such as 2 every 2  -Assess extremities for adequate circulation and sensation     Bed Management:  -Have minimal linens on bed & keep smooth, unwrinkled  -Change linens as needed when moist or perspiring  -Avoid sitting or lying in one position for more than 2 hours while in bed  -Keep HOB at 2degrees     Toileting:  -Offer bedside commode  -Assess for incontinence every 2  -Use incontinent care products after each incontinent episode such as 2    Activity:  -Mobilize patient 2 times a day  -Encourage activity and walks on unit  -Encourage or provide ROM exercises   -Turn and reposition patient every 2 Hours  -Use appropriate equipment to lift or move patient in bed  -Instruct/ Assist with weight shifting every 2 when out of bed in chair  -Consider limitation of chair time 2 hour intervals    Skin Care:  -Avoid use of baby powder, tape, friction and shearing, hot water or constrictive clothing  -Relieve pressure over bony prominences using 2  -Do not massage red bony areas    Next Steps:  -Teach patient strategies to minimize risks such as 2   -Consider consults to  interdisciplinary teams such as 2  Outcome: Progressing  Goal: Incision(s), wounds(s) or drain site(s) healing without S/S of infection  Description: INTERVENTIONS  - Assess and document dressing, incision, wound bed, drain sites and surrounding tissue  - Provide patient and family education  - Perform skin care/dressing changes every 2  Outcome: Progressing  Goal: Pressure injury heals and does not worsen  Description: Interventions:  - Implement low air loss mattress or specialty surface (Criteria met)  - Apply silicone foam dressing  - Instruct/assist with weight shifting every 2 minutes when in chair   - Limit chair time to 2 hour intervals  - Use special pressure reducing interventions such as 2 when in chair   - Apply fecal or urinary incontinence containment device   - Perform  passive or active ROM every 2  - Turn and reposition patient & offload bony prominences every 2 hours   - Utilize friction reducing device or surface for transfers   - Consider consults to  interdisciplinary teams such as 2  - Use incontinent care products after each incontinent episode such as 2  - Consider nutrition services referral as needed  Outcome: Progressing     Problem: HEMATOLOGIC - ADULT  Goal: Maintains hematologic stability  Description: INTERVENTIONS  - Assess for signs and symptoms of bleeding or hemorrhage  - Monitor labs  - Administer supportive blood products/factors as ordered and appropriate  Outcome: Progressing     Problem: MUSCULOSKELETAL - ADULT  Goal: Maintain or return mobility to safest level of function  Description: INTERVENTIONS:  - Assess patient's ability to carry out ADLs; assess patient's baseline for ADL function and identify physical deficits which impact ability to perform ADLs (bathing, care of mouth/teeth, toileting, grooming, dressing, etc.)  - Assess/evaluate cause of self-care deficits   - Assess range of motion  - Assess patient's mobility  - Assess patient's need for assistive devices and provide as appropriate  - Encourage maximum independence but intervene and supervise when necessary  - Involve family in performance of ADLs  - Assess for home care needs following discharge   - Consider OT consult to assist with ADL evaluation and planning for discharge  - Provide patient education as appropriate  Outcome: Progressing  Goal: Maintain proper alignment of affected body part  Description: INTERVENTIONS:  - Support, maintain and protect limb and body alignment  - Provide patient/ family with appropriate education  Outcome: Progressing

## 2025-05-23 NOTE — PHYSICAL THERAPY NOTE
"   Physical Therapy Treatment Note    Patient Name: Marisol Otoole    Diagnosis: Neck pain [M54.2]  UTI (urinary tract infection) [N39.0]  Pyelonephritis [N12]  Head injury [S09.90XA]  ERIN (acute kidney injury) (HCC) [N17.9]  Fall, initial encounter [W19.XXXA]  Ambulatory dysfunction [R26.2]     05/23/25 1316   PT Last Visit   PT Visit Date 05/23/25   Note Type   Note Type Treatment   Pain Assessment   Pain Assessment Tool 0-10   Pain Score 8   Pain Location/Orientation Orientation: Right;Location: Shoulder   Pain Onset/Description Onset: Ongoing   Hospital Pain Intervention(s) Repositioned;Ambulation/increased activity   Restrictions/Precautions   Weight Bearing Precautions Per Order No   Other Precautions Chair Alarm;Bed Alarm;Multiple lines;O2;Fall Risk;Pain  (+4L O2 via NC)   General   Chart Reviewed Yes   Response to Previous Treatment Patient with no complaints from previous session.   Family/Caregiver Present No   Cognition   Overall Cognitive Status WFL   Arousal/Participation Alert;Responsive;Cooperative   Attention Within functional limits   Orientation Level Oriented X4   Memory Decreased recall of recent events   Following Commands Follows multistep commands without difficulty   Comments Pt agreeable to PT.   Subjective   Subjective \"I've only been walking to the bathroom.\"   Bed Mobility   Additional Comments Pt was received seated in chair in NAD.   Transfers   Sit to Stand 4  Minimal assistance   Additional items Assist x 1;Armrests;Increased time required;Verbal cues   Stand to Sit 4  Minimal assistance   Additional items Assist x 1;Armrests;Increased time required;Verbal cues   Ambulation/Elevation   Gait pattern Decreased toe off;Decreased heel strike;Decreased hip extension;Excessively slow;Short stride;Shuffling   Gait Assistance 4  Minimal assist   Additional items Assist x 1;Verbal cues   Assistive Device Rolling walker   Distance 15 feet x 1 trial; seated rest break; 60 feet x 1 trial   Balance "   Static Sitting Fair +   Dynamic Sitting Fair   Static Standing Fair -   Dynamic Standing Poor +   Ambulatory Poor +   Endurance Deficit   Endurance Deficit Yes   Endurance Deficit Description decreased activity tolerance; pt was received on 4L O2 via NC; SpO2 stable   Activity Tolerance   Activity Tolerance Patient tolerated treatment well   Medical Staff Made Aware PT student [facility]   Assessment   Prognosis Good   Problem List Decreased strength;Decreased endurance;Impaired balance;Decreased mobility;Pain   Assessment Chart reviewed. Patient was received seated in recliner in NAD and agreeable to PT session. Today's PT treatment session consisted of therapeutic activity for facilitation of transitional movements and safe performance of correct technique for sit to stand transfers and gait training to promote safe and functional ambulation on level surfaces. In comparison to the previous session the patient has made progress as evident by ability to ambulate an increased distance. Pt also ambulated two gait trials this session with a seated rest break between each ambulation trial. When ambulating pt exhibits decreased yemi and decreased stride length. Pt ambulated on 4L O2 via NC; SpO2 was stable throughout the session. Overall, patient tolerated today's session well and continues to be making progress towards achieving her STG's. Patient's prognosis for achieving her STG's is good as evident by pt's motivation. PT intervention continues to be appropriate as the patient continues to be limited by pain, decreased lower extremity strength, impaired balance, decreased endurance, gait deviations, and decreased functional mobility. Continue to recommend level 2, moderate resource intensity. PT to continue to see patient in order to address the deficits listed above and provide interventions consistent with the POC in order to achieve STG's and optimize the patient's independence with functional mobility.   Barriers  to Discharge Decreased caregiver support;Other (Comment)  (decline in functional mobility)   Goals   STG Expiration Date 05/30/25   PT Treatment Day 2   Plan   Treatment/Interventions Functional transfer training;LE strengthening/ROM;Therapeutic exercise;Endurance training;Patient/family training;Bed mobility;Gait training;Spoke to nursing;OT   Progress Progressing toward goals   PT Frequency 3-5x/wk   Discharge Recommendation   Rehab Resource Intensity Level, PT II (Moderate Resource Intensity)   AM-PAC Basic Mobility Inpatient   Turning in Flat Bed Without Bedrails 3   Lying on Back to Sitting on Edge of Flat Bed Without Bedrails 3   Moving Bed to Chair 3   Standing Up From Chair Using Arms 3   Walk in Room 3   Climb 3-5 Stairs With Railing 1   Basic Mobility Inpatient Raw Score 16   Basic Mobility Standardized Score 38.32   [facility] Highest Level Of Mobility   -HLM Goal 5: Stand one or more mins   -HLM Achieved 7: Walk 25 feet or more   Education   Education Provided Mobility training;Assistive device   Patient Demonstrates acceptance/verbal understanding;Reinforcement needed   End of Consult   Patient Position at End of Consult Bedside chair;Bed/Chair alarm activated;All needs within reach     Aleksandra Noland, PT, DPT    Time of PT treatment session: [id number]  25 minutes

## 2025-05-23 NOTE — CASE MANAGEMENT
Case Management Progress Note    Patient name Marisol Otoole  Location 2 EAST 257/2 E 257-01 MRN [id number]  : 1940 Date 2025       LOS (days): 3  Geometric Mean LOS (GMLOS) (days): 3.5  Days to GMLOS:0.2        OBJECTIVE:        Current admission status: Inpatient  Preferred Pharmacy:   [id number]  [place] [place] [facility] [address] [facility] [address]  Phone: [phone] Fax: [phone]    [facility], [place] - [id number] [place] [id number]  Phone: [phone] Fax: [phone]    Primary Care Provider: MABEL Hallman    Primary Insurance: MEDICARE  Secondary Insurance:     PROGRESS NOTE:    Per rounding with SLIM, patient is anticipated to be discharged in 24 hrs pending Creatinine levels. [facility] updated of the same via AIDIN message. CM then received an in basket message from the OPCM team reporting that patient recently refused OPCM involvement, but she can follow up with her PCP if she changes her mind. CM department will continue to follow patient through discharge.

## 2025-05-23 NOTE — OCCUPATIONAL THERAPY NOTE
Occupational Therapy Treatment Note        Patient Name: Marisol Otoole  Today's Date: 5/23/2025 05/23/25 1128   OT Last Visit   OT Visit Date 05/23/25   Note Type   Note Type Treatment   Pain Assessment   Pain Assessment Tool 0-10   Pain Score 8   Pain Location/Orientation Orientation: Bilateral;Location: Shoulder   Pain Onset/Description Onset: Ongoing   Patient's Stated Pain Goal No pain   Restrictions/Precautions   Weight Bearing Precautions Per Order No   ADL   Where Assessed Sitting at sink   Eating Assistance 5  Supervision/Setup   Eating Deficit Setup;Increased time to complete   Grooming Assistance 5  Supervision/Setup   Grooming Deficit Setup;Supervision/safety;Verbal cueing   UB Bathing Assistance 4  Minimal Assistance   UB Bathing Deficit Setup;Steadying;Increased time to complete   LB Bathing Assistance 2  Maximal Assistance   LB Bathing Deficit Setup;Steadying;Supervision/safety;Increased time to complete   UB Dressing Assistance 4  Minimal Assistance   UB Dressing Deficit Supervision/safety;Increased time to complete;Steadying   LB Dressing Assistance 2  Maximal Assistance   LB Dressing Deficit Setup;Supervision/safety;Increased time to complete   Toileting Assistance  3  Moderate Assistance   Toileting Deficit Setup;Steadying;Clothing management up;Clothing management down;Perineal hygiene   Functional Standing Tolerance   Time ~2 minutes   Activity transfer training/ LB ADLs/ perineal hygiene   Bed Mobility   Additional Comments received patient OOB to Chair   Transfers   Sit to Stand 4  Minimal assistance   Additional items Assist x 1;Increased time required;Verbal cues   Stand to Sit 4  Minimal assistance   Additional items Assist x 1;Increased time required;Verbal cues   Functional Mobility   Functional Mobility 4  Minimal assistance   Additional Comments assist of 1 with RW   Additional items Rolling walker  (O2 at 4 liters)   Toilet Transfers   Toilet Transfer From Other  (Comment)  (Chair)   Toilet Transfer Type To and from   Toilet Transfer to Standard toilet   Toilet Transfer Technique Ambulating   Toilet Transfers Minimal assistance   Toilet Transfers Comments grab bar use/ RW/ assist of 1   Cognition   Overall Cognitive Status WFL   Arousal/Participation Alert;Responsive;Cooperative   Attention Within functional limits   Orientation Level Oriented X4   Memory Decreased recall of recent events   Following Commands Follows multistep commands without difficulty   Activity Tolerance   Activity Tolerance Patient limited by fatigue   Assessment   Assessment Patient participated in Skilled OT session this date with interventions consisting of ADL re training with the use of correct body mechnaics, Energy Conservation techniques, Work simplification skills , safety awareness and fall prevention techniques,  therapeutic activities to: increase activity tolerance, increase standing tolerance time with unilateral UE support to complete sink level ADLs, increase cardiovascular endurance , increase dynamic sit/ stand balance during functional activity , increase postural control, and increase OOB/ sitting tolerance . Patient agreeable to OT treatment session, upon arrival patient was found seated OOB to Recliner, alert, responsive , and in no apparent distress.  In comparison to previous session, patient with improvements in overall endurance, dynamic sitting and standing balance,  completed self care routine while sitting sink side, required min assist for UB ADLs, Max assist for LB ADLs. Patient requiring verbal cues for safety, verbal cues for correct technique, verbal cues for pacing thru activity steps, and frequent rest periods. Patient continues to be functioning below baseline level, occupational performance remains limited secondary to factors listed above and increased risk for falls and injury.   From OT standpoint, recommendation at time of d/c would be Level 2.   Patient to  benefit from continued Occupational Therapy treatment while in the hospital to address deficits as defined above and maximize level of functional independence with ADLs and functional mobility.   Plan   Treatment Interventions ADL retraining;Functional transfer training;UE strengthening/ROM;Endurance training;Patient/family training;Equipment evaluation/education;Compensatory technique education;Continued evaluation;Energy conservation;Activityengagement   Goal Expiration Date 06/03/25   OT Treatment Day 1   OT Frequency 3-5x/wk   Discharge Recommendation   Rehab Resource Intensity Level, OT II (Moderate Resource Intensity)   AM-PAC Daily Activity Inpatient   Lower Body Dressing 2   Bathing 2   Toileting 2   Upper Body Dressing 2   Grooming 2   Eating 3   Daily Activity Raw Score 13   Daily Activity Standardized Score (Calc for Raw Score >=11) 32.03   AM-PAC Applied Cognition Inpatient   Following a Speech/Presentation 4   Understanding Ordinary Conversation 4   Taking Medications 3   Remembering Where Things Are Placed or Put Away 4   Remembering List of 4-5 Errands 3   Taking Care of Complicated Tasks 3   Applied Cognition Raw Score 21   Applied Cognition Standardized Score 44.3

## 2025-05-23 NOTE — ASSESSMENT & PLAN NOTE
Last known hemoglobin level is 8.7 from yesterday.  No active signs of bleeding seen overnight.  Recommend monitoring hemoglobin level 1 in the hospital and consider blood transfusion if hemoglobin level drops below 7.

## 2025-05-23 NOTE — ASSESSMENT & PLAN NOTE
Multifactorial, present on admission, suspected due to ATN in the setting of underlying sepsis.  Patient also had underwent CT scan with IV contrast on 5/20/2025 and underlying contrast-induced nephropathy on top of ATN cannot be ruled out.    Upon epic chart review, previously known baseline creatinine was thought to be around 0.6-0.9.  Admission creatinine was 1.77.  Renal function has worsened during the hospital stay but looking at overall renal function trend, seems to me that patient's peak creatinine level was 2.09 [5/22/2025].  Currently receiving IV fluid and overnight found to be nonoliguric.  Renal function overnight has improved to creatinine of 2.01.  Renal ultrasound done on 5/22/2025 showed bilateral simple cyst without hydronephrosis.  Recommend to continue IV fluid and monitor renal function while in the hospital.    Urine analysis done on 5/20/2025 showed hematuria with numerous WBC.  Urine culture grew Klebsiella pneumoniae and suspect hematuria due to underlying infection.  Since renal function has improved overnight, suspicious for underlying glomerulonephritis is low.    Patient is currently receiving ceftriaxone for management of acute cystitis and defer further management of antibiotic to the primary team.

## 2025-05-24 LAB
ANION GAP SERPL CALCULATED.3IONS-SCNC: 8 MMOL/L (ref 4–13)
BUN SERPL-MCNC: 40 MG/DL (ref 5–25)
CALCIUM SERPL-MCNC: 8.7 MG/DL (ref 8.4–10.2)
CHLORIDE SERPL-SCNC: 110 MMOL/L (ref 96–108)
CO2 SERPL-SCNC: 22 MMOL/L (ref 21–32)
CREAT SERPL-MCNC: 1.56 MG/DL (ref 0.6–1.3)
ERYTHROCYTE [DISTWIDTH] IN BLOOD BY AUTOMATED COUNT: 18.8 % (ref 11.6–15.1)
GFR SERPL CREATININE-BSD FRML MDRD: 30 ML/MIN/1.73SQ M
GLUCOSE SERPL-MCNC: 96 MG/DL (ref 65–140)
HCT VFR BLD AUTO: 27.3 % (ref 34.8–46.1)
HGB BLD-MCNC: 8.1 G/DL (ref 11.5–15.4)
INR PPP: 2.44 (ref 0.85–1.19)
MCH RBC QN AUTO: 26.3 PG (ref 26.8–34.3)
MCHC RBC AUTO-ENTMCNC: 29.7 G/DL (ref 31.4–37.4)
MCV RBC AUTO: 89 FL (ref 82–98)
PLATELET # BLD AUTO: 266 THOUSANDS/UL (ref 149–390)
PMV BLD AUTO: 9.4 FL (ref 8.9–12.7)
POTASSIUM SERPL-SCNC: 4.4 MMOL/L (ref 3.5–5.3)
PROTHROMBIN TIME: 27.2 SECONDS (ref 12.3–15)
RBC # BLD AUTO: 3.08 MILLION/UL (ref 3.81–5.12)
SODIUM SERPL-SCNC: 140 MMOL/L (ref 135–147)
WBC # BLD AUTO: 8.88 THOUSAND/UL (ref 4.31–10.16)

## 2025-05-24 PROCEDURE — 94660 CPAP INITIATION&MGMT: CPT

## 2025-05-24 PROCEDURE — 94760 N-INVAS EAR/PLS OXIMETRY 1: CPT

## 2025-05-24 PROCEDURE — 80048 BASIC METABOLIC PNL TOTAL CA: CPT | Performed by: INTERNAL MEDICINE

## 2025-05-24 PROCEDURE — 99232 SBSQ HOSP IP/OBS MODERATE 35: CPT | Performed by: FAMILY MEDICINE

## 2025-05-24 PROCEDURE — 85027 COMPLETE CBC AUTOMATED: CPT | Performed by: FAMILY MEDICINE

## 2025-05-24 PROCEDURE — 99232 SBSQ HOSP IP/OBS MODERATE 35: CPT | Performed by: INTERNAL MEDICINE

## 2025-05-24 PROCEDURE — 94664 DEMO&/EVAL PT USE INHALER: CPT

## 2025-05-24 PROCEDURE — 85610 PROTHROMBIN TIME: CPT | Performed by: FAMILY MEDICINE

## 2025-05-24 RX ADMIN — CEFAZOLIN SODIUM 1000 MG: 1 SOLUTION INTRAVENOUS at 23:50

## 2025-05-24 RX ADMIN — ATENOLOL 25 MG: 25 TABLET ORAL at 09:04

## 2025-05-24 RX ADMIN — LEVOTHYROXINE SODIUM 50 MCG: 0.05 TABLET ORAL at 05:19

## 2025-05-24 RX ADMIN — OXYBUTYNIN CHLORIDE 5 MG: 5 TABLET, EXTENDED RELEASE ORAL at 09:04

## 2025-05-24 RX ADMIN — PRAVASTATIN SODIUM 80 MG: 80 TABLET ORAL at 15:43

## 2025-05-24 RX ADMIN — WARFARIN SODIUM 2.5 MG: 2.5 TABLET ORAL at 17:10

## 2025-05-24 RX ADMIN — CEFAZOLIN SODIUM 1000 MG: 1 SOLUTION INTRAVENOUS at 14:54

## 2025-05-24 RX ADMIN — ACETAMINOPHEN 650 MG: 325 TABLET ORAL at 15:46

## 2025-05-24 RX ADMIN — ACETAMINOPHEN 650 MG: 325 TABLET ORAL at 00:20

## 2025-05-24 RX ADMIN — CEFAZOLIN SODIUM 1000 MG: 1 SOLUTION INTRAVENOUS at 08:00

## 2025-05-24 RX ADMIN — PANTOPRAZOLE SODIUM 40 MG: 40 TABLET, DELAYED RELEASE ORAL at 05:19

## 2025-05-24 NOTE — ASSESSMENT & PLAN NOTE
Follow-up with urine cultures.  Patient's fever trend has improved.  Ceftriaxone 2 g daily.  Now with gram-negative bacteremia.  Follow-up with final cultures.  Klebsiella in the urine.  Continue Ancef and changed to Keflex on discharge

## 2025-05-24 NOTE — ASSESSMENT & PLAN NOTE
Currently maintained on atenolol 25 mg daily.  Blood pressure readings overall acceptable over the past 24 hours.

## 2025-05-24 NOTE — PLAN OF CARE
Problem: Potential for Falls  Goal: Patient will remain free of falls  Description: INTERVENTIONS:  - Educate patient/family on patient safety including physical limitations  - Instruct patient to call for assistance with activity   - Consider consulting OT/PT to assist with strengthening/mobility based on AM PAC & JH-HLM score  - Consult OT/PT to assist with strengthening/mobility   - Keep Call bell within reach  - Keep bed low and locked with side rails adjusted as appropriate  - Keep care items and personal belongings within reach  - Initiate and maintain comfort rounds  - Make Fall Risk Sign visible to staff  - Offer Toileting every 3 Hours, in advance of need  - Initiate/Maintain alarm  - Obtain necessary fall risk management equipment  - Apply yellow socks and bracelet for high fall risk patients  - Consider moving patient to room near nurses station  Outcome: Progressing     Problem: PAIN - ADULT  Goal: Verbalizes/displays adequate comfort level or baseline comfort level  Description: Interventions:  - Encourage patient to monitor pain and request assistance  - Assess pain using appropriate pain scale  - Administer analgesics as ordered based on type and severity of pain and evaluate response  - Implement non-pharmacological measures as appropriate and evaluate response  - Consider cultural and social influences on pain and pain management  - Notify physician/advanced practitioner if interventions unsuccessful or patient reports new pain  - Educate patient/family on pain management process including their role and importance of  reporting pain   - Provide non-pharmacologic/complimentary pain relief interventions  Outcome: Progressing     Problem: INFECTION - ADULT  Goal: Absence or prevention of progression during hospitalization  Description: INTERVENTIONS:  - Assess and monitor for signs and symptoms of infection  - Monitor lab/diagnostic results  - Monitor all insertion sites, i.e. indwelling lines,  tubes, and drains  - Monitor endotracheal if appropriate and nasal secretions for changes in amount and color  - Jefferson appropriate cooling/warming therapies per order  - Administer medications as ordered  - Instruct and encourage patient and family to use good hand hygiene technique  - Identify and instruct in appropriate isolation precautions for identified infection/condition  Outcome: Progressing  Goal: Absence of fever/infection during neutropenic period  Description: INTERVENTIONS:  - Monitor WBC  - Perform strict hand hygiene  - Limit to healthy visitors only  - No plants, dried, fresh or silk flowers with rooney in patient room  Outcome: Progressing     Problem: SAFETY ADULT  Goal: Patient will remain free of falls  Description: INTERVENTIONS:  - Educate patient/family on patient safety including physical limitations  - Instruct patient to call for assistance with activity   - Consider consulting OT/PT to assist with strengthening/mobility based on AM PAC & -HLM score  - Consult OT/PT to assist with strengthening/mobility   - Keep Call bell within reach  - Keep bed low and locked with side rails adjusted as appropriate  - Keep care items and personal belongings within reach  - Initiate and maintain comfort rounds  - Make Fall Risk Sign visible to staff  - Offer Toileting every 3 Hours, in advance of need  - Initiate/Maintain alarm  - Obtain necessary fall risk management equipment:   - Apply yellow socks and bracelet for high fall risk patients  - Consider moving patient to room near nurses station  Outcome: Progressing  Goal: Maintain or return to baseline ADL function  Description: INTERVENTIONS:  -  Assess patient's ability to carry out ADLs; assess patient's baseline for ADL function and identify physical deficits which impact ability to perform ADLs (bathing, care of mouth/teeth, toileting, grooming, dressing, etc.)  - Assess/evaluate cause of self-care deficits   - Assess range of motion  - Assess  patient's mobility; develop plan if impaired  - Assess patient's need for assistive devices and provide as appropriate  - Encourage maximum independence but intervene and supervise when necessary  - Involve family in performance of ADLs  - Assess for home care needs following discharge   - Consider OT consult to assist with ADL evaluation and planning for discharge  - Provide patient education as appropriate  - Monitor functional capacity and physical performance, use of AM PAC & JH-HLM   - Monitor gait, balance and fatigue with ambulation    Outcome: Progressing  Goal: Maintains/Returns to pre admission functional level  Description: INTERVENTIONS:  - Perform AM-PAC 6 Click Basic Mobility/ Daily Activity assessment daily.  - Set and communicate daily mobility goal to care team and patient/family/caregiver.   - Collaborate with rehabilitation services on mobility goals if consulted  - Perform Range of Motion 3 times a day.  - Reposition patient every 2 hours.  - Dangle patient 3 times a day  - Stand patient 3 times a day  - Ambulate patient 3 times a day  - Out of bed to chair 3 times a day   - Out of bed for meals 3 times a day  - Out of bed for toileting  - Record patient progress and toleration of activity level   Outcome: Progressing     Problem: DISCHARGE PLANNING  Goal: Discharge to home or other facility with appropriate resources  Description: INTERVENTIONS:  - Identify barriers to discharge w/patient and caregiver  - Arrange for needed discharge resources and transportation as appropriate  - Identify discharge learning needs (meds, wound care, etc.)  - Arrange for interpretive services to assist at discharge as needed  - Refer to Case Management Department for coordinating discharge planning if the patient needs post-hospital services based on physician/advanced practitioner order or complex needs related to functional status, cognitive ability, or social support system  Outcome: Progressing     Problem:  Knowledge Deficit  Goal: Patient/family/caregiver demonstrates understanding of disease process, treatment plan, medications, and discharge instructions  Description: Complete learning assessment and assess knowledge base.  Interventions:  - Provide teaching at level of understanding  - Provide teaching via preferred learning methods  Outcome: Progressing     Problem: NEUROSENSORY - ADULT  Goal: Achieves stable or improved neurological status  Description: INTERVENTIONS  - Monitor and report changes in neurological status  - Monitor vital signs such as temperature, blood pressure, glucose, and any other labs ordered   - Initiate measures to prevent increased intracranial pressure  - Monitor for seizure activity and implement precautions if appropriate      Outcome: Progressing  Goal: Remains free of injury related to seizures activity  Description: INTERVENTIONS  - Maintain airway, patient safety  and administer oxygen as ordered  - Monitor patient for seizure activity, document and report duration and description of seizure to physician/advanced practitioner  - If seizure occurs,  ensure patient safety during seizure  - Reorient patient post seizure  - Seizure pads on all 4 side rails  - Instruct patient/family to notify RN of any seizure activity including if an aura is experienced  - Instruct patient/family to call for assistance with activity based on nursing assessment  - Administer anti-seizure medications if ordered    Outcome: Progressing  Goal: Achieves maximal functionality and self care  Description: INTERVENTIONS  - Monitor swallowing and airway patency with patient fatigue and changes in neurological status  - Encourage and assist patient to increase activity and self care.   - Encourage visually impaired, hearing impaired and aphasic patients to use assistive/communication devices  Outcome: Progressing     Problem: CARDIOVASCULAR - ADULT  Goal: Maintains optimal cardiac output and hemodynamic  stability  Description: INTERVENTIONS:  - Monitor I/O, vital signs and rhythm  - Monitor for S/S and trends of decreased cardiac output  - Administer and titrate ordered vasoactive medications to optimize hemodynamic stability  - Assess quality of pulses, skin color and temperature  - Assess for signs of decreased coronary artery perfusion  - Instruct patient to report change in severity of symptoms  Outcome: Progressing  Goal: Absence of cardiac dysrhythmias or at baseline rhythm  Description: INTERVENTIONS:  - Continuous cardiac monitoring, vital signs, obtain 12 lead EKG if ordered  - Administer antiarrhythmic and heart rate control medications as ordered  - Monitor electrolytes and administer replacement therapy as ordered  Outcome: Progressing     Problem: RESPIRATORY - ADULT  Goal: Achieves optimal ventilation and oxygenation  Description: INTERVENTIONS:  - Assess for changes in respiratory status  - Assess for changes in mentation and behavior  - Position to facilitate oxygenation and minimize respiratory effort  - Oxygen administered by appropriate delivery if ordered  - Initiate smoking cessation education as indicated  - Encourage broncho-pulmonary hygiene including cough, deep breathe, Incentive Spirometry  - Assess the need for suctioning and aspirate as needed  - Assess and instruct to report SOB or any respiratory difficulty  - Respiratory Therapy support as indicated  Outcome: Progressing     Problem: GASTROINTESTINAL - ADULT  Goal: Minimal or absence of nausea and/or vomiting  Description: INTERVENTIONS:  - Administer IV fluids if ordered to ensure adequate hydration  - Maintain NPO status until nausea and vomiting are resolved  - Nasogastric tube if ordered  - Administer ordered antiemetic medications as needed  - Provide nonpharmacologic comfort measures as appropriate  - Advance diet as tolerated, if ordered  - Consider nutrition services referral to assist patient with adequate nutrition and  appropriate food choices  Outcome: Progressing  Goal: Maintains or returns to baseline bowel function  Description: INTERVENTIONS:  - Assess bowel function  - Encourage oral fluids to ensure adequate hydration  - Administer IV fluids if ordered to ensure adequate hydration  - Administer ordered medications as needed  - Encourage mobilization and activity  - Consider nutritional services referral to assist patient with adequate nutrition and appropriate food choices  Outcome: Progressing  Goal: Maintains adequate nutritional intake  Description: INTERVENTIONS:  - Monitor percentage of each meal consumed  - Identify factors contributing to decreased intake, treat as appropriate  - Assist with meals as needed  - Monitor I&O, weight, and lab values if indicated  - Obtain nutrition services referral as needed  Outcome: Progressing  Goal: Establish and maintain optimal ostomy function  Description: INTERVENTIONS:  - Assess bowel function  - Encourage oral fluids to ensure adequate hydration  - Administer IV fluids if ordered to ensure adequate hydration   - Administer ordered medications as needed  - Encourage mobilization and activity  - Nutrition services referral to assist patient with appropriate food choices  - Assess stoma site  - Consider wound care consult   Outcome: Progressing  Goal: Oral mucous membranes remain intact  Description: INTERVENTIONS  - Assess oral mucosa and hygiene practices  - Implement preventative oral hygiene regimen  - Implement oral medicated treatments as ordered  - Initiate Nutrition services referral as needed  Outcome: Progressing     Problem: GENITOURINARY - ADULT  Goal: Maintains or returns to baseline urinary function  Description: INTERVENTIONS:  - Assess urinary function  - Encourage oral fluids to ensure adequate hydration if ordered  - Administer IV fluids as ordered to ensure adequate hydration  - Administer ordered medications as needed  - Offer frequent toileting  - Follow  urinary retention protocol if ordered  Outcome: Progressing  Goal: Absence of urinary retention  Description: INTERVENTIONS:  - Assess patient’s ability to void and empty bladder  - Monitor I/O  - Bladder scan as needed  - Discuss with physician/AP medications to alleviate retention as needed  - Discuss catheterization for long term situations as appropriate  Outcome: Progressing  Goal: Urinary catheter remains patent  Description: INTERVENTIONS:  - Assess patency of urinary catheter  - If patient has a chronic jeff, consider changing catheter if non-functioning  - Follow guidelines for intermittent irrigation of non-functioning urinary catheter  Outcome: Progressing     Problem: SKIN/TISSUE INTEGRITY - ADULT  Goal: Skin Integrity remains intact(Skin Breakdown Prevention)  Description: Assess:  -Perform Lane assessment   -Clean and moisturize skin   -Inspect skin when repositioning, toileting, and assisting with ADLS  -Assess under medical devices   -Assess extremities for adequate circulation and sensation     Bed Management:  -Have minimal linens on bed & keep smooth, unwrinkled  -Change linens as needed when moist or perspiring  -Avoid sitting or lying in one position for more than 2 hours while in bed  -Keep HOB at 45 degrees     Toileting:  -Offer bedside commode  -Assess for incontinence  -Use incontinent care products after each incontinent episode     Activity:  -Mobilize patient 3 times a day  -Encourage activity and walks on unit  -Encourage or provide ROM exercises   -Turn and reposition patient every 2 Hours  -Use appropriate equipment to lift or move patient in bed  -Instruct/ Assist with weight shifting every 2 HOURS when out of bed in chair  -Consider limitation of chair time 2 hour intervals    Skin Care:  -Avoid use of baby powder, tape, friction and shearing, hot water or constrictive clothing  -Relieve pressure over bony prominences using wedges  -Do not massage red bony areas    Next  Steps:  -Teach patient strategies to minimize risks of breakdown.  -Consider consults to  interdisciplinary teams   Outcome: Progressing  Goal: Incision(s), wounds(s) or drain site(s) healing without S/S of infection  Description: INTERVENTIONS  - Assess and document dressing, incision, wound bed, drain sites and surrounding tissue  - Provide patient and family education  - Perform skin care/dressing changes   Outcome: Progressing  Goal: Pressure injury heals and does not worsen  Description: Interventions:  - Implement low air loss mattress or specialty surface (Criteria met)  - Apply silicone foam dressing  - Instruct/assist with weight shifting every 120 minutes when in chair   - Limit chair time to 2 hour intervals  - Use special pressure reducing interventions such as cushions when in chair   - Apply fecal or urinary incontinence containment device   - Perform passive or active ROM every 3 hours  - Turn and reposition patient & offload bony prominences every 2 hours   - Utilize friction reducing device or surface for transfers   - Consider consults to  interdisciplinary teams   - Use incontinent care products after each incontinent episode   - Consider nutrition services referral as needed  Outcome: Progressing     Problem: HEMATOLOGIC - ADULT  Goal: Maintains hematologic stability  Description: INTERVENTIONS  - Assess for signs and symptoms of bleeding or hemorrhage  - Monitor labs  - Administer supportive blood products/factors as ordered and appropriate  Outcome: Progressing     Problem: MUSCULOSKELETAL - ADULT  Goal: Maintain or return mobility to safest level of function  Description: INTERVENTIONS:  - Assess patient's ability to carry out ADLs; assess patient's baseline for ADL function and identify physical deficits which impact ability to perform ADLs (bathing, care of mouth/teeth, toileting, grooming, dressing, etc.)  - Assess/evaluate cause of self-care deficits   - Assess range of motion  - Assess  patient's mobility  - Assess patient's need for assistive devices and provide as appropriate  - Encourage maximum independence but intervene and supervise when necessary  - Involve family in performance of ADLs  - Assess for home care needs following discharge   - Consider OT consult to assist with ADL evaluation and planning for discharge  - Provide patient education as appropriate  Outcome: Progressing  Goal: Maintain proper alignment of affected body part  Description: INTERVENTIONS:  - Support, maintain and protect limb and body alignment  - Provide patient/ family with appropriate education  Outcome: Progressing

## 2025-05-24 NOTE — ASSESSMENT & PLAN NOTE
Most recent hemoglobin was 8.1, no signs of active bleeding.  Recommend considering blood transfusion for hemoglobin less than 7.

## 2025-05-24 NOTE — ASSESSMENT & PLAN NOTE
After review of the medical record, it appears that previously known baseline creatinine level was fluctuating at around 0.6-0.9.  She presented to our facility with a creatinine level of 1.77 worsening to a peak of 2.09 on 5/22/2025.    Renal ultrasound on 5/22/2025 noted bilateral simple renal cyst without evidence of hydronephrosis.  Urinalysis on 5/20/2025 noted hematuria with numerous WBCs, urine culture positive for Klebsiella and hematuria suspected secondary to underlying urinary tract infection.  Patient is currently receiving cefazolin, management per primary team.    Etiology is multifactorial and suspected secondary to underlying ATN in the setting of sepsis, patient also underwent CT scan with IV contrast on 5/20/2025 and underlying contrast-induced nephropathy on top of ATN cannot be ruled out.    Patient was receiving IV fluid and was noted to have about 500 mL of urine output though she does report episodes of incontinence that were not measured.  She does endorse some mild shortness of breath and is currently on oxygen.    Most recent creatinine level has improved to 1.56.  Will hold IV fluids at present time.  Recommend monitoring strict I's/O and will recheck renal function in the a.m.

## 2025-05-24 NOTE — ASSESSMENT & PLAN NOTE
I have held her Lasix,  Patient appears a little short of breath today.  I will stop the fluids.  Discussed with nephrology.  I will restart her Lasix tomorrow.  X-ray from yesterday was unremarkable

## 2025-05-24 NOTE — PROGRESS NOTES
Progress Note - Nephrology   Name: Mariosl Otoole 84 y.o. female I MRN: [id number]  Unit/Bed#: 2 E 257-01 I Date of Admission: 5/20/2025   Date of Service: 5/24/2025 I Hospital Day: 4     Assessment & Plan  ERIN (acute kidney injury) (HCC)  After review of the medical record, it appears that previously known baseline creatinine level was fluctuating at around 0.6-0.9.  She presented to our facility with a creatinine level of 1.77 worsening to a peak of 2.09 on 5/22/2025.    Renal ultrasound on 5/22/2025 noted bilateral simple renal cyst without evidence of hydronephrosis.  Urinalysis on 5/20/2025 noted hematuria with numerous WBCs, urine culture positive for Klebsiella and hematuria suspected secondary to underlying urinary tract infection.  Patient is currently receiving cefazolin, management per primary team.    Etiology is multifactorial and suspected secondary to underlying ATN in the setting of sepsis, patient also underwent CT scan with IV contrast on 5/20/2025 and underlying contrast-induced nephropathy on top of ATN cannot be ruled out.    Patient was receiving IV fluid and was noted to have about 500 mL of urine output though she does report episodes of incontinence that were not measured.  She does endorse some mild shortness of breath and is currently on oxygen.    Most recent creatinine level has improved to 1.56.  Will hold IV fluids at present time.  Recommend monitoring strict I's/O and will recheck renal function in the a.m.  Primary hypertension  Currently maintained on atenolol 25 mg daily.  Blood pressure readings overall acceptable over the past 24 hours.  Anemia  Most recent hemoglobin was 8.1, no signs of active bleeding.  Recommend considering blood transfusion for hemoglobin less than 7.  Acute cystitis without hematuria  Noted to have presence of cystitis on imaging along with pyelitis with urine culture growing Klebsiella pneumonia.  Currently maintained on cefazolin.    I have reviewed the  nephrology recommendations including stopping IVF, with SLIM, and we are in agreement with renal plan including the information outlined above. I have discussed the above management plan in detail with the primary service.   Nephrology service will follow.    Subjective   Brief History of Admission -84-year-old female with a past medical history significant for sick sinus syndrome status post pacemaker, CHF, atrial fibrillation, hypertension, and JANICE who presented to our facility after a fall from home.    Patient was seen sitting in the bedside chair today.  Reports that she is having some intermittent shortness of breath, no worse than yesterday.  Reports episodes of incontinence.    Objective :  Temp:  [98.5 °F (36.9 °C)-99.1 °F (37.3 °C)] 98.6 °F (37 °C)  HR:  [55-81] 60  BP: (129-169)/(54-73) 132/55  Resp:  [18-24] 18  SpO2:  [90 %-97 %] 94 %  O2 Device: Nasal cannula  Nasal Cannula O2 Flow Rate (L/min):  [4 L/min] 4 L/min    Current Weight:    First Weight:    I/O         05/22 0701  05/23 0700 05/23 0701  05/24 0700 05/24 0701  05/25 0700    P.O. 960 1142     I.V.   750    Total Intake 960 1142 750    Urine 700 450     Total Output 700 450     Net +260 +692 +750           Unmeasured Urine Occurrence 2 x 5 x           Physical Exam  Vitals and nursing note reviewed.   Constitutional:       General: She is not in acute distress.     Appearance: She is well-developed.   HENT:      Head: Normocephalic and atraumatic.     Eyes:      Conjunctiva/sclera: Conjunctivae normal.       Cardiovascular:      Rate and Rhythm: Normal rate and regular rhythm.   Pulmonary:      Effort: Pulmonary effort is normal.      Breath sounds: Normal breath sounds.      Comments: On nasal cannula  Abdominal:      Palpations: Abdomen is soft.     Musculoskeletal:         General: No swelling.      Cervical back: Neck supple.     Skin:     General: Skin is warm and dry.      Capillary Refill: Capillary refill takes less than 2 seconds.  "    Neurological:      Mental Status: She is alert.     Psychiatric:         Mood and Affect: Mood normal.         Medications:  Current Medications[1]      Lab Results: I have reviewed the following results:  Results from last 7 days   Lab Units 05/24/25  0451 05/23/25  0555 05/22/25  0551 05/21/25  0624 05/20/25  1301 05/20/25  0133   WBC Thousand/uL 8.88  --  9.14 10.96* 14.18* 12.65*   HEMOGLOBIN g/dL 8.1*  --  8.7* 8.6* 9.5* 9.3*   HEMATOCRIT % 27.3*  --  29.1* 27.7* 31.9* 30.3*   PLATELETS Thousands/uL 266  --  270 256 324 318   POTASSIUM mmol/L 4.4 4.3 4.0 4.1 4.5 4.7   CHLORIDE mmol/L 110* 108 109* 108 107 105   CO2 mmol/L 22 23 22 21 24 22   BUN mg/dL 40* 47* 48* 44* 37* 42*   CREATININE mg/dL 1.56* 2.01* 2.09* 1.92* 1.54* 1.77*   CALCIUM mg/dL 8.7 9.0 8.7 8.7 9.3 9.2   ALBUMIN g/dL  --   --   --   --   --  3.4*       Administrative Statements     Portions of the record may have been created with voice recognition software. Occasional wrong word or \"sound a like\" substitutions may have occurred due to the inherent limitations of voice recognition software. Read the chart carefully and recognize, using context, where substitutions have occurred.If you have any questions, please contact the dictating provider.       [1]   Current Facility-Administered Medications:     acetaminophen (TYLENOL) tablet 650 mg, 650 mg, Oral, Q6H PRN, Cristian Escalante MD, 650 mg at 05/24/25 0020    atenolol (TENORMIN) tablet 25 mg, 25 mg, Oral, Daily, Wilfred Wolf MD, 25 mg at 05/24/25 0904    ceFAZolin (ANCEF) IVPB (premix in dextrose) 1,000 mg 50 mL, 1,000 mg, Intravenous, Q8H, Cristian Escalante MD, Last Rate: 100 mL/hr at 05/24/25 0800, 1,000 mg at 05/24/25 0800    levothyroxine tablet 50 mcg, 50 mcg, Oral, Early Morning, Fernando Escalante MD, 50 mcg at 05/24/25 0519    oxybutynin (DITROPAN-XL) 24 hr tablet 5 mg, 5 mg, Oral, Daily, MABEL Israel, 5 mg at 05/24/25 0904    pantoprazole (PROTONIX) EC tablet " 40 mg, 40 mg, Oral, Early Morning, Fernando Escalante MD, 40 mg at 05/24/25 0519    pravastatin (PRAVACHOL) tablet 80 mg, 80 mg, Oral, Daily With Dinner, Fernando Escalante MD, 80 mg at 05/23/25 1659    warfarin (COUMADIN) tablet 2.5 mg, 2.5 mg, Oral, Daily (warfarin), Cristian Escalante MD, 2.5 mg at 05/23/25 1700

## 2025-05-24 NOTE — RESPIRATORY THERAPY NOTE
05/23/25 2142   Respiratory Assessment   Assessment Type Assess only   General Appearance Awake   Respiratory Pattern Assisted   Chest Assessment Chest expansion symmetrical   Bilateral Breath Sounds Diminished   Resp Comments pt placed on CPAP for hours of sleep   O2 Device V60   Non-Invasive Information   O2 Interface Device Face mask   Non-Invasive Ventilation Mode CPAP   $ Intermittent NIV Yes   $ Pulse Oximetry Spot Check Charge Completed   Non-Invasive Settings   FiO2 (%) 40   PEEP/CPAP (cm H2O) 8   Rise Time 2   Non-Invasive Readings   Total Rate 31   Spontaneous MV (mL) 13.6   Spontaneous Vt (mL) 437   Leak (lpm) 12   Skin Intervention Skin intact   Non-Invasive Alarms   Insp Pressure High (cm H20) 20   Insp Pressure Low (cm H20) 5   Low Insp Pressure Time (sec) 20 sec   MV Low (L/min) 2   Vt High (mL) 1200   Vt Low (mL) 200   High Resp Rate (BPM) 40 BPM   Low Resp Rate (BPM) 8 BPM   Apnea Interval (sec) 20

## 2025-05-24 NOTE — ASSESSMENT & PLAN NOTE
Noted to have presence of cystitis on imaging along with pyelitis with urine culture growing Klebsiella pneumonia.  Currently maintained on cefazolin.

## 2025-05-24 NOTE — PLAN OF CARE
Problem: Potential for Falls  Goal: Patient will remain free of falls  Description: INTERVENTIONS:  - Educate patient/family on patient safety including physical limitations  - Instruct patient to call for assistance with activity   - Consider consulting OT/PT to assist with strengthening/mobility based on AM PAC & JH-HLM score  - Consult OT/PT to assist with strengthening/mobility   - Keep Call bell within reach  - Keep bed low and locked with side rails adjusted as appropriate  - Keep care items and personal belongings within reach  - Initiate and maintain comfort rounds  - Make Fall Risk Sign visible to staff  - Offer Toileting every    Hours, in advance of need  - Initiate/Maintain   alarm  - Obtain necessary fall risk management equipment:     - Apply yellow socks and bracelet for high fall risk patients  - Consider moving patient to room near nurses station  Outcome: Progressing     Problem: PAIN - ADULT  Goal: Verbalizes/displays adequate comfort level or baseline comfort level  Description: Interventions:  - Encourage patient to monitor pain and request assistance  - Assess pain using appropriate pain scale  - Administer analgesics as ordered based on type and severity of pain and evaluate response  - Implement non-pharmacological measures as appropriate and evaluate response  - Consider cultural and social influences on pain and pain management  - Notify physician/advanced practitioner if interventions unsuccessful or patient reports new pain  - Educate patient/family on pain management process including their role and importance of  reporting pain   - Provide non-pharmacologic/complimentary pain relief interventions  Outcome: Progressing     Problem: INFECTION - ADULT  Goal: Absence or prevention of progression during hospitalization  Description: INTERVENTIONS:  - Assess and monitor for signs and symptoms of infection  - Monitor lab/diagnostic results  - Monitor all insertion sites, i.e. indwelling  lines, tubes, and drains  - Monitor endotracheal if appropriate and nasal secretions for changes in amount and color  - Henry appropriate cooling/warming therapies per order  - Administer medications as ordered  - Instruct and encourage patient and family to use good hand hygiene technique  - Identify and instruct in appropriate isolation precautions for identified infection/condition  Outcome: Progressing  Goal: Absence of fever/infection during neutropenic period  Description: INTERVENTIONS:  - Monitor WBC  - Perform strict hand hygiene  - Limit to healthy visitors only  - No plants, dried, fresh or silk flowers with rooney in patient room  Outcome: Progressing     Problem: SAFETY ADULT  Goal: Patient will remain free of falls  Description: INTERVENTIONS:  - Educate patient/family on patient safety including physical limitations  - Instruct patient to call for assistance with activity   - Consider consulting OT/PT to assist with strengthening/mobility based on AM PAC & -HLM score  - Consult OT/PT to assist with strengthening/mobility   - Keep Call bell within reach  - Keep bed low and locked with side rails adjusted as appropriate  - Keep care items and personal belongings within reach  - Initiate and maintain comfort rounds  - Make Fall Risk Sign visible to staff  - Offer Toileting every    Hours, in advance of need  - Initiate/Maintain   alarm  - Obtain necessary fall risk management equipment:     - Apply yellow socks and bracelet for high fall risk patients  - Consider moving patient to room near nurses station  Outcome: Progressing  Goal: Maintain or return to baseline ADL function  Description: INTERVENTIONS:  -  Assess patient's ability to carry out ADLs; assess patient's baseline for ADL function and identify physical deficits which impact ability to perform ADLs (bathing, care of mouth/teeth, toileting, grooming, dressing, etc.)  - Assess/evaluate cause of self-care deficits   - Assess range of  motion  - Assess patient's mobility; develop plan if impaired  - Assess patient's need for assistive devices and provide as appropriate  - Encourage maximum independence but intervene and supervise when necessary  - Involve family in performance of ADLs  - Assess for home care needs following discharge   - Consider OT consult to assist with ADL evaluation and planning for discharge  - Provide patient education as appropriate  - Monitor functional capacity and physical performance, use of AM PAC & JH-HLM   - Monitor gait, balance and fatigue with ambulation    Outcome: Progressing  Goal: Maintains/Returns to pre admission functional level  Description: INTERVENTIONS:  - Perform AM-PAC 6 Click Basic Mobility/ Daily Activity assessment daily.  - Set and communicate daily mobility goal to care team and patient/family/caregiver.   - Collaborate with rehabilitation services on mobility goals if consulted  - Perform Range of Motion    times a day.  - Reposition patient every    hours.  - Dangle patient    times a day  - Stand patient    times a day  - Ambulate patient    times a day  - Out of bed to chair    times a day   - Out of bed for meals    times a day  - Out of bed for toileting  - Record patient progress and toleration of activity level   Outcome: Progressing     Problem: DISCHARGE PLANNING  Goal: Discharge to home or other facility with appropriate resources  Description: INTERVENTIONS:  - Identify barriers to discharge w/patient and caregiver  - Arrange for needed discharge resources and transportation as appropriate  - Identify discharge learning needs (meds, wound care, etc.)  - Arrange for interpretive services to assist at discharge as needed  - Refer to Case Management Department for coordinating discharge planning if the patient needs post-hospital services based on physician/advanced practitioner order or complex needs related to functional status, cognitive ability, or social support system  Outcome:  Progressing     Problem: Knowledge Deficit  Goal: Patient/family/caregiver demonstrates understanding of disease process, treatment plan, medications, and discharge instructions  Description: Complete learning assessment and assess knowledge base.  Interventions:  - Provide teaching at level of understanding  - Provide teaching via preferred learning methods  Outcome: Progressing     Problem: NEUROSENSORY - ADULT  Goal: Achieves stable or improved neurological status  Description: INTERVENTIONS  - Monitor and report changes in neurological status  - Monitor vital signs such as temperature, blood pressure, glucose, and any other labs ordered   - Initiate measures to prevent increased intracranial pressure  - Monitor for seizure activity and implement precautions if appropriate      Outcome: Progressing  Goal: Remains free of injury related to seizures activity  Description: INTERVENTIONS  - Maintain airway, patient safety  and administer oxygen as ordered  - Monitor patient for seizure activity, document and report duration and description of seizure to physician/advanced practitioner  - If seizure occurs,  ensure patient safety during seizure  - Reorient patient post seizure  - Seizure pads on all 4 side rails  - Instruct patient/family to notify RN of any seizure activity including if an aura is experienced  - Instruct patient/family to call for assistance with activity based on nursing assessment  - Administer anti-seizure medications if ordered    Outcome: Progressing  Goal: Achieves maximal functionality and self care  Description: INTERVENTIONS  - Monitor swallowing and airway patency with patient fatigue and changes in neurological status  - Encourage and assist patient to increase activity and self care.   - Encourage visually impaired, hearing impaired and aphasic patients to use assistive/communication devices  Outcome: Progressing     Problem: CARDIOVASCULAR - ADULT  Goal: Maintains optimal cardiac output  and hemodynamic stability  Description: INTERVENTIONS:  - Monitor I/O, vital signs and rhythm  - Monitor for S/S and trends of decreased cardiac output  - Administer and titrate ordered vasoactive medications to optimize hemodynamic stability  - Assess quality of pulses, skin color and temperature  - Assess for signs of decreased coronary artery perfusion  - Instruct patient to report change in severity of symptoms  Outcome: Progressing  Goal: Absence of cardiac dysrhythmias or at baseline rhythm  Description: INTERVENTIONS:  - Continuous cardiac monitoring, vital signs, obtain 12 lead EKG if ordered  - Administer antiarrhythmic and heart rate control medications as ordered  - Monitor electrolytes and administer replacement therapy as ordered  Outcome: Progressing     Problem: RESPIRATORY - ADULT  Goal: Achieves optimal ventilation and oxygenation  Description: INTERVENTIONS:  - Assess for changes in respiratory status  - Assess for changes in mentation and behavior  - Position to facilitate oxygenation and minimize respiratory effort  - Oxygen administered by appropriate delivery if ordered  - Initiate smoking cessation education as indicated  - Encourage broncho-pulmonary hygiene including cough, deep breathe, Incentive Spirometry  - Assess the need for suctioning and aspirate as needed  - Assess and instruct to report SOB or any respiratory difficulty  - Respiratory Therapy support as indicated  Outcome: Progressing     Problem: GASTROINTESTINAL - ADULT  Goal: Minimal or absence of nausea and/or vomiting  Description: INTERVENTIONS:  - Administer IV fluids if ordered to ensure adequate hydration  - Maintain NPO status until nausea and vomiting are resolved  - Nasogastric tube if ordered  - Administer ordered antiemetic medications as needed  - Provide nonpharmacologic comfort measures as appropriate  - Advance diet as tolerated, if ordered  - Consider nutrition services referral to assist patient with adequate  nutrition and appropriate food choices  Outcome: Progressing  Goal: Maintains or returns to baseline bowel function  Description: INTERVENTIONS:  - Assess bowel function  - Encourage oral fluids to ensure adequate hydration  - Administer IV fluids if ordered to ensure adequate hydration  - Administer ordered medications as needed  - Encourage mobilization and activity  - Consider nutritional services referral to assist patient with adequate nutrition and appropriate food choices  Outcome: Progressing  Goal: Maintains adequate nutritional intake  Description: INTERVENTIONS:  - Monitor percentage of each meal consumed  - Identify factors contributing to decreased intake, treat as appropriate  - Assist with meals as needed  - Monitor I&O, weight, and lab values if indicated  - Obtain nutrition services referral as needed  Outcome: Progressing  Goal: Establish and maintain optimal ostomy function  Description: INTERVENTIONS:  - Assess bowel function  - Encourage oral fluids to ensure adequate hydration  - Administer IV fluids if ordered to ensure adequate hydration   - Administer ordered medications as needed  - Encourage mobilization and activity  - Nutrition services referral to assist patient with appropriate food choices  - Assess stoma site  - Consider wound care consult   Outcome: Progressing  Goal: Oral mucous membranes remain intact  Description: INTERVENTIONS  - Assess oral mucosa and hygiene practices  - Implement preventative oral hygiene regimen  - Implement oral medicated treatments as ordered  - Initiate Nutrition services referral as needed  Outcome: Progressing     Problem: GENITOURINARY - ADULT  Goal: Maintains or returns to baseline urinary function  Description: INTERVENTIONS:  - Assess urinary function  - Encourage oral fluids to ensure adequate hydration if ordered  - Administer IV fluids as ordered to ensure adequate hydration  - Administer ordered medications as needed  - Offer frequent  toileting  - Follow urinary retention protocol if ordered  Outcome: Progressing  Goal: Absence of urinary retention  Description: INTERVENTIONS:  - Assess patient’s ability to void and empty bladder  - Monitor I/O  - Bladder scan as needed  - Discuss with physician/AP medications to alleviate retention as needed  - Discuss catheterization for long term situations as appropriate  Outcome: Progressing  Goal: Urinary catheter remains patent  Description: INTERVENTIONS:  - Assess patency of urinary catheter  - If patient has a chronic jeff, consider changing catheter if non-functioning  - Follow guidelines for intermittent irrigation of non-functioning urinary catheter  Outcome: Progressing     Problem: METABOLIC, FLUID AND ELECTROLYTES - ADULT  Goal: Electrolytes maintained within normal limits  Description: INTERVENTIONS:  - Monitor labs and assess patient for signs and symptoms of electrolyte imbalances  - Administer electrolyte replacement as ordered  - Monitor response to electrolyte replacements, including repeat lab results as appropriate  - Instruct patient on fluid and nutrition as appropriate  Outcome: Progressing  Goal: Fluid balance maintained  Description: INTERVENTIONS:  - Monitor labs   - Monitor I/O and WT  - Instruct patient on fluid and nutrition as appropriate  - Assess for signs & symptoms of volume excess or deficit  Outcome: Progressing  Goal: Glucose maintained within target range  Description: INTERVENTIONS:  - Monitor Blood Glucose as ordered  - Assess for signs and symptoms of hyperglycemia and hypoglycemia  - Administer ordered medications to maintain glucose within target range  - Assess nutritional intake and initiate nutrition service referral as needed  Outcome: Progressing     Problem: SKIN/TISSUE INTEGRITY - ADULT  Goal: Skin Integrity remains intact(Skin Breakdown Prevention)  Description: Assess:  -Perform Lane assessment every     -Clean and moisturize skin every     -Inspect skin  when repositioning, toileting, and assisting with ADLS  -Assess under medical devices such as    every     -Assess extremities for adequate circulation and sensation     Bed Management:  -Have minimal linens on bed & keep smooth, unwrinkled  -Change linens as needed when moist or perspiring  -Avoid sitting or lying in one position for more than    hours while in bed  -Keep HOB at   degrees     Toileting:  -Offer bedside commode  -Assess for incontinence every     -Use incontinent care products after each incontinent episode such as       Activity:  -Mobilize patient    times a day  -Encourage activity and walks on unit  -Encourage or provide ROM exercises   -Turn and reposition patient every    Hours  -Use appropriate equipment to lift or move patient in bed  -Instruct/ Assist with weight shifting every    when out of bed in chair  -Consider limitation of chair time    hour intervals    Skin Care:  -Avoid use of baby powder, tape, friction and shearing, hot water or constrictive clothing  -Relieve pressure over bony prominences using     -Do not massage red bony areas    Next Steps:  -Teach patient strategies to minimize risks such as      -Consider consults to  interdisciplinary teams such as     Outcome: Progressing  Goal: Incision(s), wounds(s) or drain site(s) healing without S/S of infection  Description: INTERVENTIONS  - Assess and document dressing, incision, wound bed, drain sites and surrounding tissue  - Provide patient and family education  - Perform skin care/dressing changes every     Outcome: Progressing  Goal: Pressure injury heals and does not worsen  Description: Interventions:  - Implement low air loss mattress or specialty surface (Criteria met)  - Apply silicone foam dressing  - Instruct/assist with weight shifting every    minutes when in chair   - Limit chair time to    hour intervals  - Use special pressure reducing interventions such as    when in chair   - Apply fecal or urinary incontinence  containment device   - Perform passive or active ROM every     - Turn and reposition patient & offload bony prominences every    hours   - Utilize friction reducing device or surface for transfers   - Consider consults to  interdisciplinary teams such as     - Use incontinent care products after each incontinent episode such as     - Consider nutrition services referral as needed  Outcome: Progressing     Problem: HEMATOLOGIC - ADULT  Goal: Maintains hematologic stability  Description: INTERVENTIONS  - Assess for signs and symptoms of bleeding or hemorrhage  - Monitor labs  - Administer supportive blood products/factors as ordered and appropriate  Outcome: Progressing     Problem: MUSCULOSKELETAL - ADULT  Goal: Maintain or return mobility to safest level of function  Description: INTERVENTIONS:  - Assess patient's ability to carry out ADLs; assess patient's baseline for ADL function and identify physical deficits which impact ability to perform ADLs (bathing, care of mouth/teeth, toileting, grooming, dressing, etc.)  - Assess/evaluate cause of self-care deficits   - Assess range of motion  - Assess patient's mobility  - Assess patient's need for assistive devices and provide as appropriate  - Encourage maximum independence but intervene and supervise when necessary  - Involve family in performance of ADLs  - Assess for home care needs following discharge   - Consider OT consult to assist with ADL evaluation and planning for discharge  - Provide patient education as appropriate  Outcome: Progressing  Goal: Maintain proper alignment of affected body part  Description: INTERVENTIONS:  - Support, maintain and protect limb and body alignment  - Provide patient/ family with appropriate education  Outcome: Progressing

## 2025-05-25 LAB
ANION GAP SERPL CALCULATED.3IONS-SCNC: 7 MMOL/L (ref 4–13)
BACTERIA BLD CULT: NORMAL
BUN SERPL-MCNC: 32 MG/DL (ref 5–25)
CALCIUM SERPL-MCNC: 9.4 MG/DL (ref 8.4–10.2)
CHLORIDE SERPL-SCNC: 109 MMOL/L (ref 96–108)
CO2 SERPL-SCNC: 24 MMOL/L (ref 21–32)
CREAT SERPL-MCNC: 1.26 MG/DL (ref 0.6–1.3)
ERYTHROCYTE [DISTWIDTH] IN BLOOD BY AUTOMATED COUNT: 18.4 % (ref 11.6–15.1)
GFR SERPL CREATININE-BSD FRML MDRD: 38 ML/MIN/1.73SQ M
GLUCOSE SERPL-MCNC: 97 MG/DL (ref 65–140)
HCT VFR BLD AUTO: 28.6 % (ref 34.8–46.1)
HGB BLD-MCNC: 8.6 G/DL (ref 11.5–15.4)
MCH RBC QN AUTO: 26.5 PG (ref 26.8–34.3)
MCHC RBC AUTO-ENTMCNC: 30.1 G/DL (ref 31.4–37.4)
MCV RBC AUTO: 88 FL (ref 82–98)
PLATELET # BLD AUTO: 257 THOUSANDS/UL (ref 149–390)
PMV BLD AUTO: 8.7 FL (ref 8.9–12.7)
POTASSIUM SERPL-SCNC: 4.3 MMOL/L (ref 3.5–5.3)
RBC # BLD AUTO: 3.24 MILLION/UL (ref 3.81–5.12)
SODIUM SERPL-SCNC: 140 MMOL/L (ref 135–147)
WBC # BLD AUTO: 10.27 THOUSAND/UL (ref 4.31–10.16)

## 2025-05-25 PROCEDURE — 99232 SBSQ HOSP IP/OBS MODERATE 35: CPT | Performed by: INTERNAL MEDICINE

## 2025-05-25 PROCEDURE — 94664 DEMO&/EVAL PT USE INHALER: CPT

## 2025-05-25 PROCEDURE — 80048 BASIC METABOLIC PNL TOTAL CA: CPT

## 2025-05-25 PROCEDURE — 99232 SBSQ HOSP IP/OBS MODERATE 35: CPT | Performed by: FAMILY MEDICINE

## 2025-05-25 PROCEDURE — 85027 COMPLETE CBC AUTOMATED: CPT | Performed by: FAMILY MEDICINE

## 2025-05-25 RX ORDER — CALCIUM CARBONATE 500 MG/1
500 TABLET, CHEWABLE ORAL 2 TIMES DAILY PRN
Status: DISCONTINUED | OUTPATIENT
Start: 2025-05-25 | End: 2025-05-26 | Stop reason: HOSPADM

## 2025-05-25 RX ORDER — FUROSEMIDE 20 MG/1
20 TABLET ORAL DAILY
Status: DISCONTINUED | OUTPATIENT
Start: 2025-05-25 | End: 2025-05-26 | Stop reason: HOSPADM

## 2025-05-25 RX ADMIN — WARFARIN SODIUM 2.5 MG: 2.5 TABLET ORAL at 17:10

## 2025-05-25 RX ADMIN — FUROSEMIDE 20 MG: 20 TABLET ORAL at 11:27

## 2025-05-25 RX ADMIN — ACETAMINOPHEN 650 MG: 325 TABLET ORAL at 02:57

## 2025-05-25 RX ADMIN — OXYBUTYNIN CHLORIDE 5 MG: 5 TABLET, EXTENDED RELEASE ORAL at 08:18

## 2025-05-25 RX ADMIN — CEFAZOLIN SODIUM 1000 MG: 1 SOLUTION INTRAVENOUS at 15:30

## 2025-05-25 RX ADMIN — PRAVASTATIN SODIUM 80 MG: 80 TABLET ORAL at 17:10

## 2025-05-25 RX ADMIN — CEFAZOLIN SODIUM 1000 MG: 1 SOLUTION INTRAVENOUS at 08:18

## 2025-05-25 RX ADMIN — ACETAMINOPHEN 650 MG: 325 TABLET ORAL at 17:10

## 2025-05-25 RX ADMIN — CALCIUM CARBONATE (ANTACID) CHEW TAB 500 MG 500 MG: 500 CHEW TAB at 21:15

## 2025-05-25 RX ADMIN — LEVOTHYROXINE SODIUM 50 MCG: 0.05 TABLET ORAL at 05:53

## 2025-05-25 RX ADMIN — CEFAZOLIN SODIUM 1000 MG: 1 SOLUTION INTRAVENOUS at 22:50

## 2025-05-25 RX ADMIN — PANTOPRAZOLE SODIUM 40 MG: 40 TABLET, DELAYED RELEASE ORAL at 05:53

## 2025-05-25 RX ADMIN — ATENOLOL 25 MG: 25 TABLET ORAL at 08:18

## 2025-05-25 NOTE — PLAN OF CARE
Problem: Potential for Falls  Goal: Patient will remain free of falls  Description: INTERVENTIONS:  - Educate patient/family on patient safety including physical limitations  - Instruct patient to call for assistance with activity   - Consider consulting OT/PT to assist with strengthening/mobility based on AM PAC & JH-HLM score  - Consult OT/PT to assist with strengthening/mobility   - Keep Call bell within reach  - Keep bed low and locked with side rails adjusted as appropriate  - Keep care items and personal belongings within reach  - Initiate and maintain comfort rounds  - Make Fall Risk Sign visible to staff  - Offer Toileting every  Hours, in advance of need  - Initiate/Maintain alarm  - Obtain necessary fall risk management equipment:   - Apply yellow socks and bracelet for high fall risk patients  - Consider moving patient to room near nurses station  Outcome: Progressing     Problem: PAIN - ADULT  Goal: Verbalizes/displays adequate comfort level or baseline comfort level  Description: Interventions:  - Encourage patient to monitor pain and request assistance  - Assess pain using appropriate pain scale  - Administer analgesics as ordered based on type and severity of pain and evaluate response  - Implement non-pharmacological measures as appropriate and evaluate response  - Consider cultural and social influences on pain and pain management  - Notify physician/advanced practitioner if interventions unsuccessful or patient reports new pain  - Educate patient/family on pain management process including their role and importance of  reporting pain   - Provide non-pharmacologic/complimentary pain relief interventions  Outcome: Progressing     Problem: INFECTION - ADULT  Goal: Absence or prevention of progression during hospitalization  Description: INTERVENTIONS:  - Assess and monitor for signs and symptoms of infection  - Monitor lab/diagnostic results  - Monitor all insertion sites, i.e. indwelling lines,  tubes, and drains  - Monitor endotracheal if appropriate and nasal secretions for changes in amount and color  - Elma appropriate cooling/warming therapies per order  - Administer medications as ordered  - Instruct and encourage patient and family to use good hand hygiene technique  - Identify and instruct in appropriate isolation precautions for identified infection/condition  Outcome: Progressing  Goal: Absence of fever/infection during neutropenic period  Description: INTERVENTIONS:  - Monitor WBC  - Perform strict hand hygiene  - Limit to healthy visitors only  - No plants, dried, fresh or silk flowers with rooney in patient room  Outcome: Progressing     Problem: SAFETY ADULT  Goal: Patient will remain free of falls  Description: INTERVENTIONS:  - Educate patient/family on patient safety including physical limitations  - Instruct patient to call for assistance with activity   - Consider consulting OT/PT to assist with strengthening/mobility based on AM PAC & -HLM score  - Consult OT/PT to assist with strengthening/mobility   - Keep Call bell within reach  - Keep bed low and locked with side rails adjusted as appropriate  - Keep care items and personal belongings within reach  - Initiate and maintain comfort rounds  - Make Fall Risk Sign visible to staff  - Offer Toileting every  Hours, in advance of need  - Initiate/Maintain alarm  - Obtain necessary fall risk management equipment:   - Apply yellow socks and bracelet for high fall risk patients  - Consider moving patient to room near nurses station  Outcome: Progressing  Goal: Maintain or return to baseline ADL function  Description: INTERVENTIONS:  -  Assess patient's ability to carry out ADLs; assess patient's baseline for ADL function and identify physical deficits which impact ability to perform ADLs (bathing, care of mouth/teeth, toileting, grooming, dressing, etc.)  - Assess/evaluate cause of self-care deficits   - Assess range of motion  - Assess  patient's mobility; develop plan if impaired  - Assess patient's need for assistive devices and provide as appropriate  - Encourage maximum independence but intervene and supervise when necessary  - Involve family in performance of ADLs  - Assess for home care needs following discharge   - Consider OT consult to assist with ADL evaluation and planning for discharge  - Provide patient education as appropriate  - Monitor functional capacity and physical performance, use of AM PAC & JH-HLM   - Monitor gait, balance and fatigue with ambulation    Outcome: Progressing  Goal: Maintains/Returns to pre admission functional level  Description: INTERVENTIONS:  - Perform AM-PAC 6 Click Basic Mobility/ Daily Activity assessment daily.  - Set and communicate daily mobility goal to care team and patient/family/caregiver.   - Collaborate with rehabilitation services on mobility goals if consulted  - Perform Range of Motion  times a day.  - Reposition patient every  hours.  - Dangle patient  times a day  - Stand patient  times a day  - Ambulate patient  times a day  - Out of bed to chair  times a day   - Out of bed for meals  times a day  - Out of bed for toileting  - Record patient progress and toleration of activity level   Outcome: Progressing     Problem: DISCHARGE PLANNING  Goal: Discharge to home or other facility with appropriate resources  Description: INTERVENTIONS:  - Identify barriers to discharge w/patient and caregiver  - Arrange for needed discharge resources and transportation as appropriate  - Identify discharge learning needs (meds, wound care, etc.)  - Arrange for interpretive services to assist at discharge as needed  - Refer to Case Management Department for coordinating discharge planning if the patient needs post-hospital services based on physician/advanced practitioner order or complex needs related to functional status, cognitive ability, or social support system  Outcome: Progressing     Problem: Knowledge  Deficit  Goal: Patient/family/caregiver demonstrates understanding of disease process, treatment plan, medications, and discharge instructions  Description: Complete learning assessment and assess knowledge base.  Interventions:  - Provide teaching at level of understanding  - Provide teaching via preferred learning methods  Outcome: Progressing     Problem: NEUROSENSORY - ADULT  Goal: Achieves stable or improved neurological status  Description: INTERVENTIONS  - Monitor and report changes in neurological status  - Monitor vital signs such as temperature, blood pressure, glucose, and any other labs ordered   - Initiate measures to prevent increased intracranial pressure  - Monitor for seizure activity and implement precautions if appropriate      Outcome: Progressing  Goal: Remains free of injury related to seizures activity  Description: INTERVENTIONS  - Maintain airway, patient safety  and administer oxygen as ordered  - Monitor patient for seizure activity, document and report duration and description of seizure to physician/advanced practitioner  - If seizure occurs,  ensure patient safety during seizure  - Reorient patient post seizure  - Seizure pads on all 4 side rails  - Instruct patient/family to notify RN of any seizure activity including if an aura is experienced  - Instruct patient/family to call for assistance with activity based on nursing assessment  - Administer anti-seizure medications if ordered    Outcome: Progressing  Goal: Achieves maximal functionality and self care  Description: INTERVENTIONS  - Monitor swallowing and airway patency with patient fatigue and changes in neurological status  - Encourage and assist patient to increase activity and self care.   - Encourage visually impaired, hearing impaired and aphasic patients to use assistive/communication devices  Outcome: Progressing     Problem: CARDIOVASCULAR - ADULT  Goal: Maintains optimal cardiac output and hemodynamic  stability  Description: INTERVENTIONS:  - Monitor I/O, vital signs and rhythm  - Monitor for S/S and trends of decreased cardiac output  - Administer and titrate ordered vasoactive medications to optimize hemodynamic stability  - Assess quality of pulses, skin color and temperature  - Assess for signs of decreased coronary artery perfusion  - Instruct patient to report change in severity of symptoms  Outcome: Progressing  Goal: Absence of cardiac dysrhythmias or at baseline rhythm  Description: INTERVENTIONS:  - Continuous cardiac monitoring, vital signs, obtain 12 lead EKG if ordered  - Administer antiarrhythmic and heart rate control medications as ordered  - Monitor electrolytes and administer replacement therapy as ordered  Outcome: Progressing     Problem: RESPIRATORY - ADULT  Goal: Achieves optimal ventilation and oxygenation  Description: INTERVENTIONS:  - Assess for changes in respiratory status  - Assess for changes in mentation and behavior  - Position to facilitate oxygenation and minimize respiratory effort  - Oxygen administered by appropriate delivery if ordered  - Initiate smoking cessation education as indicated  - Encourage broncho-pulmonary hygiene including cough, deep breathe, Incentive Spirometry  - Assess the need for suctioning and aspirate as needed  - Assess and instruct to report SOB or any respiratory difficulty  - Respiratory Therapy support as indicated  Outcome: Progressing     Problem: GASTROINTESTINAL - ADULT  Goal: Minimal or absence of nausea and/or vomiting  Description: INTERVENTIONS:  - Administer IV fluids if ordered to ensure adequate hydration  - Maintain NPO status until nausea and vomiting are resolved  - Nasogastric tube if ordered  - Administer ordered antiemetic medications as needed  - Provide nonpharmacologic comfort measures as appropriate  - Advance diet as tolerated, if ordered  - Consider nutrition services referral to assist patient with adequate nutrition and  appropriate food choices  Outcome: Progressing  Goal: Maintains or returns to baseline bowel function  Description: INTERVENTIONS:  - Assess bowel function  - Encourage oral fluids to ensure adequate hydration  - Administer IV fluids if ordered to ensure adequate hydration  - Administer ordered medications as needed  - Encourage mobilization and activity  - Consider nutritional services referral to assist patient with adequate nutrition and appropriate food choices  Outcome: Progressing  Goal: Maintains adequate nutritional intake  Description: INTERVENTIONS:  - Monitor percentage of each meal consumed  - Identify factors contributing to decreased intake, treat as appropriate  - Assist with meals as needed  - Monitor I&O, weight, and lab values if indicated  - Obtain nutrition services referral as needed  Outcome: Progressing  Goal: Establish and maintain optimal ostomy function  Description: INTERVENTIONS:  - Assess bowel function  - Encourage oral fluids to ensure adequate hydration  - Administer IV fluids if ordered to ensure adequate hydration   - Administer ordered medications as needed  - Encourage mobilization and activity  - Nutrition services referral to assist patient with appropriate food choices  - Assess stoma site  - Consider wound care consult   Outcome: Progressing  Goal: Oral mucous membranes remain intact  Description: INTERVENTIONS  - Assess oral mucosa and hygiene practices  - Implement preventative oral hygiene regimen  - Implement oral medicated treatments as ordered  - Initiate Nutrition services referral as needed  Outcome: Progressing     Problem: GENITOURINARY - ADULT  Goal: Maintains or returns to baseline urinary function  Description: INTERVENTIONS:  - Assess urinary function  - Encourage oral fluids to ensure adequate hydration if ordered  - Administer IV fluids as ordered to ensure adequate hydration  - Administer ordered medications as needed  - Offer frequent toileting  - Follow  urinary retention protocol if ordered  Outcome: Progressing  Goal: Absence of urinary retention  Description: INTERVENTIONS:  - Assess patient’s ability to void and empty bladder  - Monitor I/O  - Bladder scan as needed  - Discuss with physician/AP medications to alleviate retention as needed  - Discuss catheterization for long term situations as appropriate  Outcome: Progressing  Goal: Urinary catheter remains patent  Description: INTERVENTIONS:  - Assess patency of urinary catheter  - If patient has a chronic jeff, consider changing catheter if non-functioning  - Follow guidelines for intermittent irrigation of non-functioning urinary catheter  Outcome: Progressing     Problem: METABOLIC, FLUID AND ELECTROLYTES - ADULT  Goal: Electrolytes maintained within normal limits  Description: INTERVENTIONS:  - Monitor labs and assess patient for signs and symptoms of electrolyte imbalances  - Administer electrolyte replacement as ordered  - Monitor response to electrolyte replacements, including repeat lab results as appropriate  - Instruct patient on fluid and nutrition as appropriate  Outcome: Progressing  Goal: Fluid balance maintained  Description: INTERVENTIONS:  - Monitor labs   - Monitor I/O and WT  - Instruct patient on fluid and nutrition as appropriate  - Assess for signs & symptoms of volume excess or deficit  Outcome: Progressing  Goal: Glucose maintained within target range  Description: INTERVENTIONS:  - Monitor Blood Glucose as ordered  - Assess for signs and symptoms of hyperglycemia and hypoglycemia  - Administer ordered medications to maintain glucose within target range  - Assess nutritional intake and initiate nutrition service referral as needed  Outcome: Progressing     Problem: SKIN/TISSUE INTEGRITY - ADULT  Goal: Skin Integrity remains intact(Skin Breakdown Prevention)  Description: Assess:  -Perform Lane assessment every   -Clean and moisturize skin every   -Inspect skin when repositioning,  toileting, and assisting with ADLS  -Assess under medical devices such as  every   -Assess extremities for adequate circulation and sensation     Bed Management:  -Have minimal linens on bed & keep smooth, unwrinkled  -Change linens as needed when moist or perspiring  -Avoid sitting or lying in one position for more than  hours while in bed  -Keep HOB at degrees     Toileting:  -Offer bedside commode  -Assess for incontinence every   -Use incontinent care products after each incontinent episode such as     Activity:  -Mobilize patient  times a day  -Encourage activity and walks on unit  -Encourage or provide ROM exercises   -Turn and reposition patient every  Hours  -Use appropriate equipment to lift or move patient in bed  -Instruct/ Assist with weight shifting every  when out of bed in chair  -Consider limitation of chair time  hour intervals    Skin Care:  -Avoid use of baby powder, tape, friction and shearing, hot water or constrictive clothing  -Relieve pressure over bony prominences using   -Do not massage red bony areas    Next Steps:  -Teach patient strategies to minimize risks such as    -Consider consults to  interdisciplinary teams such as   Outcome: Progressing  Goal: Incision(s), wounds(s) or drain site(s) healing without S/S of infection  Description: INTERVENTIONS  - Assess and document dressing, incision, wound bed, drain sites and surrounding tissue  - Provide patient and family education  - Perform skin care/dressing changes every   Outcome: Progressing  Goal: Pressure injury heals and does not worsen  Description: Interventions:  - Implement low air loss mattress or specialty surface (Criteria met)  - Apply silicone foam dressing  - Instruct/assist with weight shifting every  minutes when in chair   - Limit chair time to  hour intervals  - Use special pressure reducing interventions such as  when in chair   - Apply fecal or urinary incontinence containment device   - Perform passive or active ROM  every   - Turn and reposition patient & offload bony prominences every  hours   - Utilize friction reducing device or surface for transfers   - Consider consults to  interdisciplinary teams such as   - Use incontinent care products after each incontinent episode such as   - Consider nutrition services referral as needed  Outcome: Progressing     Problem: HEMATOLOGIC - ADULT  Goal: Maintains hematologic stability  Description: INTERVENTIONS  - Assess for signs and symptoms of bleeding or hemorrhage  - Monitor labs  - Administer supportive blood products/factors as ordered and appropriate  Outcome: Progressing     Problem: MUSCULOSKELETAL - ADULT  Goal: Maintain or return mobility to safest level of function  Description: INTERVENTIONS:  - Assess patient's ability to carry out ADLs; assess patient's baseline for ADL function and identify physical deficits which impact ability to perform ADLs (bathing, care of mouth/teeth, toileting, grooming, dressing, etc.)  - Assess/evaluate cause of self-care deficits   - Assess range of motion  - Assess patient's mobility  - Assess patient's need for assistive devices and provide as appropriate  - Encourage maximum independence but intervene and supervise when necessary  - Involve family in performance of ADLs  - Assess for home care needs following discharge   - Consider OT consult to assist with ADL evaluation and planning for discharge  - Provide patient education as appropriate  Outcome: Progressing  Goal: Maintain proper alignment of affected body part  Description: INTERVENTIONS:  - Support, maintain and protect limb and body alignment  - Provide patient/ family with appropriate education  Outcome: Progressing     Problem: Prexisting or High Potential for Compromised Skin Integrity  Goal: Skin integrity is maintained or improved  Description: INTERVENTIONS:  - Identify patients at risk for skin breakdown  - Assess and monitor skin integrity including under and around medical  devices   - Assess and monitor nutrition and hydration status  - Monitor labs  - Assess for incontinence   - Turn and reposition patient  - Assist with mobility/ambulation  - Relieve pressure over sandeep prominences   - Avoid friction and shearing  - Provide appropriate hygiene as needed including keeping skin clean and dry  - Evaluate need for skin moisturizer/barrier cream  - Collaborate with interdisciplinary team  - Patient/family teaching  - Consider wound care consult    Assess:  - Review Lane scale daily  - Clean and moisturize skin every   - Inspect skin when repositioning, toileting, and assisting with ADLS  - Assess under medical devices such as  every   - Assess extremities for adequate circulation and sensation     Bed Management:  - Have minimal linens on bed & keep smooth, unwrinkled  - Change linens as needed when moist or perspiring  - Avoid sitting or lying in one position for more than  hours while in bed?Keep HOB at degrees   - Toileting:  - Offer bedside commode  - Assess for incontinence every   - Use incontinent care products after each incontinent episode such as     Activity:  - Mobilize patient  times a day  - Encourage activity and walks on unit  - Encourage or provide ROM exercises   - Turn and reposition patient every  Hours  - Use appropriate equipment to lift or move patient in bed  - Instruct/ Assist with weight shifting every  when out of bed in chair  - Consider limitation of chair time  hour intervals    Skin Care:  - Avoid use of baby powder, tape, friction and shearing, hot water or constrictive clothing  - Relieve pressure over bony prominences using   - Do not massage red bony areas    Next Steps:  - Teach patient strategies to minimize risks such as  - Consider consults to  interdisciplinary teams such as   Outcome: Progressing

## 2025-05-25 NOTE — DISCHARGE SUMMARY
Discharge Summary - Hospitalist   Name: Marisol Otoole 84 y.o. female I MRN: [id number]  Unit/Bed#: 2 E 257-01 I Date of Admission: 5/20/2025   Date of Service: 5/25/2025 I Hospital Day: 5     Assessment & Plan  Acute cystitis without hematuria    Follow-up with urine cultures.  Patient's fever trend has improved.  Ceftriaxone 2 g daily.  Now with gram-negative bacteremia.  Follow-up with final cultures.  Klebsiella in the urine.  Changed to Keflex on discharge  Pyelitis  Continue Rocephin  Fall  Fall at home, consult PT OT/case management  ERIN (acute kidney injury) ([facility])  I have held her Lasix,  Patient appears a little short of breath today.  I will stop the fluids.  Discussed with nephrology.  I will restart her Lasix tomorrow.  X-ray from yesterday was unremarkable  Chronic heart failure with preserved ejection fraction (HFpEF) (HCC)  Continue atenolol,     Wt Readings from Last 3 Encounters:   05/24/25 77.1 kg (170 lb)   05/12/25 77.1 kg (170 lb)   05/09/25 77.1 kg (170 lb)   Continue Lasix  Paroxysmal atrial fibrillation (HCC)  Continue atenolol and Coumadin  Still with supratherapeutic INR  GERD (gastroesophageal reflux disease)  Continue PPI  Acquired hypothyroidism  Continue levothyroxine  Primary hypertension  Continue Tylenol  Hyperlipidemia  Continue statin  S/P placement of cardiac pacemaker  Secondary SSS s/p MDT PPM   COPD, mild (HCC)  Stable  JANICE on CPAP  Cpap   Morbid obesity due to excess calories (HCC)  BMI 40.99  Anemia  Stable hemoglobin     Medical Problems       Resolved Problems  Date Reviewed: 5/12/2025   None       Discharging Physician / Practitioner: Cristian Escalante MD  PCP: MABEL Hallman  Admission Date:   Admission Orders (From admission, onward)       Ordered        05/20/25 0348  INPATIENT ADMISSION  Once                          Discharge Date: 5/26/25    Next Steps for Physician/AP Assuming Care:  BMP in 1 week    Test Results Pending at Discharge (will require follow  "up):  None    Medication Changes for Discharge & Rationale:   Keflex to complete a course for bacteremia likely secondary to UTI  See after visit summary for reconciled discharge medications provided to patient and/or family.     Consultations During Hospital Stay:  Nephrology    Procedures Performed:   None    Significant Findings / Test Results:   Creatinine on discharge is 1.26    Incidental Findings:   None      Hospital Course:   Marisol Otoole is a 84 y.o. female patient who originally presented to the hospital on 5/20/2025 due to fall    History presenting illness: Patient came in after a fall.  She had increased weakness.    Hospital course: The patient was mated for above reasons.  Was found to be bacteremic.  This was secondary to urinary tract infection.  Came back for Klebsiella.  Pansensitive.  DC on Keflex.  Patient also had acute renal failure here.  Put on gentle hydration.  Likely ATN from contrast.  Improved on discharge.  Lasix resumed.  Seen by physical therapy who recommended rehab but the patient states she has been to rehab before and did not help and opted to go home.  She does also have chronic hypoxemic respiratory failure and is at her baseline when it comes to that.            Please see above list of diagnoses and related plan for additional information.     Discharge Day Visit / Exam:   Subjective: Patient seen and examined.  States she is feeling okay today.  Vitals: Blood Pressure: 156/64 (05/25/25 0733)  Pulse: 60 (05/25/25 0733)  Temperature: 98.7 °F (37.1 °C) (05/24/25 2212)  Temp Source: Oral (05/24/25 2212)  Respirations: 18 (05/24/25 2212)  Height: 4' 6\" (137.2 cm) (05/24/25 2212)  Weight - Scale: 77.1 kg (170 lb) (05/24/25 2212)  SpO2: 94 % (05/25/25 0733)  Physical Exam   General Appearance:    Alert, cooperative, no distress, appears stated age                               Lungs:     Clear to auscultation bilaterally, respirations unlabored       Heart:    Regular rate and " rhythm, S1 and S2 normal, no murmur, rub    or gallop   Abdomen:     Soft, non-tender, bowel sounds active all four quadrants,     no masses, no organomegaly           Extremities:   Extremities normal, atraumatic, no cyanosis or edema                             Discharge instructions/Information to patient and family:   See after visit summary for information provided to patient and family.      Provisions for Follow-Up Care:  See after visit summary for information related to follow-up care and any pertinent home health orders.      Mobility at time of Discharge:   Basic Mobility Inpatient Raw Score: 16  JH-HLM Goal: 5: Stand one or more mins  JH-HLM Achieved: 6: Walk 10 steps or more  HLM Goal achieved. Continue to encourage appropriate mobility.     Disposition:   Home    Planned Readmission: None anticipated    Administrative Statements   Discharge Statement:  I have spent a total time of 35 minutes in caring for this patient on the day of the visit/encounter.     **Please Note: This note may have been constructed using a voice recognition system**

## 2025-05-25 NOTE — RESPIRATORY THERAPY NOTE
05/24/25 2018   Respiratory Assessment   Assessment Type Assess only   General Appearance Awake   Respiratory Pattern Assisted   Chest Assessment Chest expansion symmetrical   Resp Comments pt placed on CPAP for hours of sleep   O2 Device V60   Non-Invasive Information   O2 Interface Device Face mask   Non-Invasive Ventilation Mode CPAP   $ Intermittent NIV Yes   $ Pulse Oximetry Spot Check Charge Completed   Non-Invasive Settings   FiO2 (%) 40   PEEP/CPAP (cm H2O) 8   Rise Time 2   Non-Invasive Readings   Total Rate 27   Spontaneous MV (mL) 9.4   Spontaneous Vt (mL) 341   Leak (lpm) 1   Non-Invasive Alarms   Insp Pressure High (cm H20) 20   Insp Pressure Low (cm H20) 5   Low Insp Pressure Time (sec) 20 sec   MV Low (L/min) 2   Vt High (mL) 1200   Vt Low (mL) 200   High Resp Rate (BPM) 40 BPM   Low Resp Rate (BPM) 8 BPM   Apnea Interval (sec) 20

## 2025-05-25 NOTE — ASSESSMENT & PLAN NOTE
Most recent hemoglobin was 8.1, no signs of active bleeding.  Recommend blood transfusion for hemoglobin less than 7.

## 2025-05-25 NOTE — ASSESSMENT & PLAN NOTE
Currently maintained on atenolol 25 mg daily.  Blood pressure readings within acceptable range over the past 24 hours.

## 2025-05-25 NOTE — PROGRESS NOTES
Progress Note - Nephrology   Name: Marisol Otoole 84 y.o. female I MRN: [id number]  Unit/Bed#: 2 E 257-01 I Date of Admission: 5/20/2025   Date of Service: 5/25/2025 I Hospital Day: 5     Assessment & Plan  ERIN (acute kidney injury) (HCC)  After review of the medical record, it appears that previously known baseline creatinine level was fluctuating at around 0.6-0.9.  She presented to our facility with a creatinine level of 1.77 worsening to a peak of 2.09 on 5/22/2025.    Renal ultrasound on 5/22/2025 noted bilateral simple renal cyst without evidence of hydronephrosis.  Urinalysis on 5/20/2025 noted hematuria with numerous WBCs, urine culture positive for Klebsiella and hematuria suspected secondary to underlying urinary tract infection.  Patient is currently receiving cefazolin, management per primary team.    Etiology is multifactorial and suspected secondary to underlying ATN in the setting of sepsis, patient also underwent CT scan with IV contrast on 5/20/2025 and underlying contrast-induced nephropathy on top of ATN cannot be ruled out.    Patient was provided with IV fluids with noted improvement in creatinine levels.  IV fluids were discontinued yesterday due to increasing oxygen requirements.  Most recent creatinine level has improved to 1.26, patient endorses good urine output but no specific documentation in the I/O.    Recommend resumption of furosemide upon hospital discharge.  Continue to monitor renal function during hospitalization.  Primary hypertension  Currently maintained on atenolol 25 mg daily.  Blood pressure readings within acceptable range over the past 24 hours.  Anemia  Most recent hemoglobin was 8.1, no signs of active bleeding.  Recommend blood transfusion for hemoglobin less than 7.  Acute cystitis without hematuria  Noted to have presence of cystitis on imaging along with pyelitis with urine culture growing Klebsiella pneumonia.  Currently maintained on cefazolin.  Management per  primary team.    I have reviewed the nephrology recommendations including resuming diuretic, with SLIM, and we are in agreement with renal plan including the information outlined above. I have discussed the above management plan in detail with the primary service.   Nephrology service will follow.    Subjective   Brief History of Admission - 84-year-old female with a past medical history significant for sick sinus syndrome status post pacemaker, CHF, atrial fibrillation, hypertension, and JANICE who presented to our facility after a fall from home.     Seen at the bedside today, still does endorse mild shortness of breath when she has exertion though feels a little better when compared to yesterday.    Objective :  Temp:  [96.9 °F (36.1 °C)-98.7 °F (37.1 °C)] 98.7 °F (37.1 °C)  HR:  [60-61] 60  BP: (140-156)/(56-69) 156/64  Resp:  [18] 18  SpO2:  [92 %-98 %] 94 %  O2 Device: Nasal cannula  Nasal Cannula O2 Flow Rate (L/min):  [3 L/min] 3 L/min    Current Weight: Weight - Scale: 77.1 kg (170 lb)  First Weight: Weight - Scale: 77.1 kg (170 lb)  I/O         05/23 0701  05/24 0700 05/24 0701  05/25 0700 05/25 0701  05/26 0700    P.O. 1142      I.V. (mL/kg)  750 (9.7)     Total Intake(mL/kg) 1142 750 (9.7)     Urine (mL/kg/hr) 450      Total Output 450      Net +692 +750            Unmeasured Urine Occurrence 5 x 2 x           Physical Exam  Vitals and nursing note reviewed.   Constitutional:       General: She is not in acute distress.     Appearance: She is well-developed.   HENT:      Head: Normocephalic and atraumatic.     Eyes:      Conjunctiva/sclera: Conjunctivae normal.       Cardiovascular:      Rate and Rhythm: Normal rate and regular rhythm.      Heart sounds: No murmur heard.  Pulmonary:      Effort: Pulmonary effort is normal. No accessory muscle usage.      Breath sounds: Decreased air movement present.      Comments: On nasal cannula oxygen  Abdominal:      Tenderness: There is no abdominal tenderness.  "    Musculoskeletal:         General: No swelling.      Cervical back: Neck supple.     Skin:     General: Skin is warm and dry.     Neurological:      Mental Status: She is alert.     Psychiatric:         Mood and Affect: Mood normal.         Medications:  Current Medications[1]      Lab Results: I have reviewed the following results:  Results from last 7 days   Lab Units 05/25/25  1001 05/25/25  0442 05/24/25  0451 05/23/25  0555 05/22/25  0551 05/21/25  0624 05/20/25  1301 05/20/25  0133   WBC Thousand/uL 10.27*  --  8.88  --  9.14 10.96* 14.18* 12.65*   HEMOGLOBIN g/dL 8.6*  --  8.1*  --  8.7* 8.6* 9.5* 9.3*   HEMATOCRIT % 28.6*  --  27.3*  --  29.1* 27.7* 31.9* 30.3*   PLATELETS Thousands/uL 257  --  266  --  270 256 324 318   POTASSIUM mmol/L  --  4.3 4.4 4.3 4.0 4.1 4.5 4.7   CHLORIDE mmol/L  --  109* 110* 108 109* 108 107 105   CO2 mmol/L  --  24 22 23 22 21 24 22   BUN mg/dL  --  32* 40* 47* 48* 44* 37* 42*   CREATININE mg/dL  --  1.26 1.56* 2.01* 2.09* 1.92* 1.54* 1.77*   CALCIUM mg/dL  --  9.4 8.7 9.0 8.7 8.7 9.3 9.2   ALBUMIN g/dL  --   --   --   --   --   --   --  3.4*       Administrative Statements     Portions of the record may have been created with voice recognition software. Occasional wrong word or \"sound a like\" substitutions may have occurred due to the inherent limitations of voice recognition software. Read the chart carefully and recognize, using context, where substitutions have occurred.If you have any questions, please contact the dictating provider.       [1]   Current Facility-Administered Medications:     acetaminophen (TYLENOL) tablet 650 mg, 650 mg, Oral, Q6H PRN, Cristian Escalante MD, 650 mg at 05/25/25 0257    atenolol (TENORMIN) tablet 25 mg, 25 mg, Oral, Daily, Wilfred Wolf MD, 25 mg at 05/25/25 0818    ceFAZolin (ANCEF) IVPB (premix in dextrose) 1,000 mg 50 mL, 1,000 mg, Intravenous, Q8H, Cristian Chema Escalante MD, Last Rate: 100 mL/hr at 05/25/25 0818, 1,000 mg at 05/25/25 0818    " levothyroxine tablet 50 mcg, 50 mcg, Oral, Early Morning, Fernando Escalante MD, 50 mcg at 05/25/25 0553    oxybutynin (DITROPAN-XL) 24 hr tablet 5 mg, 5 mg, Oral, Daily, MABEL Israel, 5 mg at 05/25/25 0818    pantoprazole (PROTONIX) EC tablet 40 mg, 40 mg, Oral, Early Morning, Fernando Escalante MD, 40 mg at 05/25/25 0553    pravastatin (PRAVACHOL) tablet 80 mg, 80 mg, Oral, Daily With Dinner, Fernando Escalante MD, 80 mg at 05/24/25 1543    warfarin (COUMADIN) tablet 2.5 mg, 2.5 mg, Oral, Daily (warfarin), Cristian Escalante MD, 2.5 mg at 05/24/25 7180

## 2025-05-25 NOTE — ASSESSMENT & PLAN NOTE
Continue atenolol, lasix held due to ERIN    Wt Readings from Last 3 Encounters:   05/24/25 77.1 kg (170 lb)   05/12/25 77.1 kg (170 lb)   05/09/25 77.1 kg (170 lb)

## 2025-05-25 NOTE — ASSESSMENT & PLAN NOTE
After review of the medical record, it appears that previously known baseline creatinine level was fluctuating at around 0.6-0.9.  She presented to our facility with a creatinine level of 1.77 worsening to a peak of 2.09 on 5/22/2025.    Renal ultrasound on 5/22/2025 noted bilateral simple renal cyst without evidence of hydronephrosis.  Urinalysis on 5/20/2025 noted hematuria with numerous WBCs, urine culture positive for Klebsiella and hematuria suspected secondary to underlying urinary tract infection.  Patient is currently receiving cefazolin, management per primary team.    Etiology is multifactorial and suspected secondary to underlying ATN in the setting of sepsis, patient also underwent CT scan with IV contrast on 5/20/2025 and underlying contrast-induced nephropathy on top of ATN cannot be ruled out.    Patient was provided with IV fluids with noted improvement in creatinine levels.  IV fluids were discontinued yesterday due to increasing oxygen requirements.  Most recent creatinine level has improved to 1.26, patient endorses good urine output but no specific documentation in the I/O.    Recommend resumption of furosemide upon hospital discharge.  Continue to monitor renal function during hospitalization.

## 2025-05-25 NOTE — PLAN OF CARE
Problem: Potential for Falls  Goal: Patient will remain free of falls  Description: INTERVENTIONS:  - Educate patient/family on patient safety including physical limitations  - Instruct patient to call for assistance with activity   - Consider consulting OT/PT to assist with strengthening/mobility based on AM PAC & JH-HLM score  - Consult OT/PT to assist with strengthening/mobility   - Keep Call bell within reach  - Keep bed low and locked with side rails adjusted as appropriate  - Keep care items and personal belongings within reach  - Initiate and maintain comfort rounds  - Make Fall Risk Sign visible to staff  - Offer Toileting every *** Hours, in advance of need  - Initiate/Maintain ***alarm  - Obtain necessary fall risk management equipment: ***  - Apply yellow socks and bracelet for high fall risk patients  - Consider moving patient to room near nurses station  Outcome: Progressing

## 2025-05-25 NOTE — PLAN OF CARE
Problem: Potential for Falls  Goal: Patient will remain free of falls  Description: INTERVENTIONS:  - Educate patient/family on patient safety including physical limitations  - Instruct patient to call for assistance with activity   - Consider consulting OT/PT to assist with strengthening/mobility based on AM PAC & JH-HLM score  - Consult OT/PT to assist with strengthening/mobility   - Keep Call bell within reach  - Keep bed low and locked with side rails adjusted as appropriate  - Keep care items and personal belongings within reach  - Initiate and maintain comfort rounds  - Make Fall Risk Sign visible to staff  - Offer Toileting every 2 Hours, in advance of need  - Initiate/Maintain bed alarm  - Obtain necessary fall risk management equipment: bed alarm, nonskid socks  - Apply yellow socks and bracelet for high fall risk patients  - Consider moving patient to room near nurses station  Outcome: Progressing     Problem: PAIN - ADULT  Goal: Verbalizes/displays adequate comfort level or baseline comfort level  Description: Interventions:  - Encourage patient to monitor pain and request assistance  - Assess pain using appropriate pain scale  - Administer analgesics as ordered based on type and severity of pain and evaluate response  - Implement non-pharmacological measures as appropriate and evaluate response  - Consider cultural and social influences on pain and pain management  - Notify physician/advanced practitioner if interventions unsuccessful or patient reports new pain  - Educate patient/family on pain management process including their role and importance of  reporting pain   - Provide non-pharmacologic/complimentary pain relief interventions  Outcome: Progressing     Problem: INFECTION - ADULT  Goal: Absence or prevention of progression during hospitalization  Description: INTERVENTIONS:  - Assess and monitor for signs and symptoms of infection  - Monitor lab/diagnostic results  - Monitor all insertion  sites, i.e. indwelling lines, tubes, and drains  - Monitor endotracheal if appropriate and nasal secretions for changes in amount and color  - Gilbert appropriate cooling/warming therapies per order  - Administer medications as ordered  - Instruct and encourage patient and family to use good hand hygiene technique  - Identify and instruct in appropriate isolation precautions for identified infection/condition  Outcome: Progressing  Goal: Absence of fever/infection during neutropenic period  Description: INTERVENTIONS:  - Monitor WBC  - Perform strict hand hygiene  - Limit to healthy visitors only  - No plants, dried, fresh or silk flowers with rooney in patient room  Outcome: Progressing     Problem: SAFETY ADULT  Goal: Patient will remain free of falls  Description: INTERVENTIONS:  - Educate patient/family on patient safety including physical limitations  - Instruct patient to call for assistance with activity   - Consider consulting OT/PT to assist with strengthening/mobility based on AM PAC & -HLM score  - Consult OT/PT to assist with strengthening/mobility   - Keep Call bell within reach  - Keep bed low and locked with side rails adjusted as appropriate  - Keep care items and personal belongings within reach  - Initiate and maintain comfort rounds  - Make Fall Risk Sign visible to staff  - Offer Toileting every 2 Hours, in advance of need  - Initiate/Maintain bed alarm  - Obtain necessary fall risk management equipment: bed alarm, nonskid socks  - Apply yellow socks and bracelet for high fall risk patients  - Consider moving patient to room near nurses station  Outcome: Progressing  Goal: Maintain or return to baseline ADL function  Description: INTERVENTIONS:  -  Assess patient's ability to carry out ADLs; assess patient's baseline for ADL function and identify physical deficits which impact ability to perform ADLs (bathing, care of mouth/teeth, toileting, grooming, dressing, etc.)  - Assess/evaluate cause of  self-care deficits   - Assess range of motion  - Assess patient's mobility; develop plan if impaired  - Assess patient's need for assistive devices and provide as appropriate  - Encourage maximum independence but intervene and supervise when necessary  - Involve family in performance of ADLs  - Assess for home care needs following discharge   - Consider OT consult to assist with ADL evaluation and planning for discharge  - Provide patient education as appropriate  - Monitor functional capacity and physical performance, use of AM PAC & JH-HLM   - Monitor gait, balance and fatigue with ambulation    Outcome: Progressing  Goal: Maintains/Returns to pre admission functional level  Description: INTERVENTIONS:  - Perform AM-PAC 6 Click Basic Mobility/ Daily Activity assessment daily.  - Set and communicate daily mobility goal to care team and patient/family/caregiver.   - Collaborate with rehabilitation services on mobility goals if consulted  - Perform Range of Motion 4 times a day.  - Reposition patient every 2 hours.  - Dangle patient 4 times a day  - Stand patient 4 times a day  - Ambulate patient 4 times a day  - Out of bed to chair 4 times a day   - Out of bed for meals 4 times a day  - Out of bed for toileting  - Record patient progress and toleration of activity level   Outcome: Progressing     Problem: DISCHARGE PLANNING  Goal: Discharge to home or other facility with appropriate resources  Description: INTERVENTIONS:  - Identify barriers to discharge w/patient and caregiver  - Arrange for needed discharge resources and transportation as appropriate  - Identify discharge learning needs (meds, wound care, etc.)  - Arrange for interpretive services to assist at discharge as needed  - Refer to Case Management Department for coordinating discharge planning if the patient needs post-hospital services based on physician/advanced practitioner order or complex needs related to functional status, cognitive ability, or  social support system  Outcome: Progressing     Problem: Knowledge Deficit  Goal: Patient/family/caregiver demonstrates understanding of disease process, treatment plan, medications, and discharge instructions  Description: Complete learning assessment and assess knowledge base.  Interventions:  - Provide teaching at level of understanding  - Provide teaching via preferred learning methods  Outcome: Progressing     Problem: NEUROSENSORY - ADULT  Goal: Achieves stable or improved neurological status  Description: INTERVENTIONS  - Monitor and report changes in neurological status  - Monitor vital signs such as temperature, blood pressure, glucose, and any other labs ordered   - Initiate measures to prevent increased intracranial pressure  - Monitor for seizure activity and implement precautions if appropriate      Outcome: Progressing  Goal: Remains free of injury related to seizures activity  Description: INTERVENTIONS  - Maintain airway, patient safety  and administer oxygen as ordered  - Monitor patient for seizure activity, document and report duration and description of seizure to physician/advanced practitioner  - If seizure occurs,  ensure patient safety during seizure  - Reorient patient post seizure  - Seizure pads on all 4 side rails  - Instruct patient/family to notify RN of any seizure activity including if an aura is experienced  - Instruct patient/family to call for assistance with activity based on nursing assessment  - Administer anti-seizure medications if ordered    Outcome: Progressing  Goal: Achieves maximal functionality and self care  Description: INTERVENTIONS  - Monitor swallowing and airway patency with patient fatigue and changes in neurological status  - Encourage and assist patient to increase activity and self care.   - Encourage visually impaired, hearing impaired and aphasic patients to use assistive/communication devices  Outcome: Progressing     Problem: CARDIOVASCULAR - ADULT  Goal:  Maintains optimal cardiac output and hemodynamic stability  Description: INTERVENTIONS:  - Monitor I/O, vital signs and rhythm  - Monitor for S/S and trends of decreased cardiac output  - Administer and titrate ordered vasoactive medications to optimize hemodynamic stability  - Assess quality of pulses, skin color and temperature  - Assess for signs of decreased coronary artery perfusion  - Instruct patient to report change in severity of symptoms  Outcome: Progressing  Goal: Absence of cardiac dysrhythmias or at baseline rhythm  Description: INTERVENTIONS:  - Continuous cardiac monitoring, vital signs, obtain 12 lead EKG if ordered  - Administer antiarrhythmic and heart rate control medications as ordered  - Monitor electrolytes and administer replacement therapy as ordered  Outcome: Progressing     Problem: RESPIRATORY - ADULT  Goal: Achieves optimal ventilation and oxygenation  Description: INTERVENTIONS:  - Assess for changes in respiratory status  - Assess for changes in mentation and behavior  - Position to facilitate oxygenation and minimize respiratory effort  - Oxygen administered by appropriate delivery if ordered  - Initiate smoking cessation education as indicated  - Encourage broncho-pulmonary hygiene including cough, deep breathe, Incentive Spirometry  - Assess the need for suctioning and aspirate as needed  - Assess and instruct to report SOB or any respiratory difficulty  - Respiratory Therapy support as indicated  Outcome: Progressing     Problem: GASTROINTESTINAL - ADULT  Goal: Minimal or absence of nausea and/or vomiting  Description: INTERVENTIONS:  - Administer IV fluids if ordered to ensure adequate hydration  - Maintain NPO status until nausea and vomiting are resolved  - Nasogastric tube if ordered  - Administer ordered antiemetic medications as needed  - Provide nonpharmacologic comfort measures as appropriate  - Advance diet as tolerated, if ordered  - Consider nutrition services referral  to assist patient with adequate nutrition and appropriate food choices  Outcome: Progressing  Goal: Maintains or returns to baseline bowel function  Description: INTERVENTIONS:  - Assess bowel function  - Encourage oral fluids to ensure adequate hydration  - Administer IV fluids if ordered to ensure adequate hydration  - Administer ordered medications as needed  - Encourage mobilization and activity  - Consider nutritional services referral to assist patient with adequate nutrition and appropriate food choices  Outcome: Progressing  Goal: Maintains adequate nutritional intake  Description: INTERVENTIONS:  - Monitor percentage of each meal consumed  - Identify factors contributing to decreased intake, treat as appropriate  - Assist with meals as needed  - Monitor I&O, weight, and lab values if indicated  - Obtain nutrition services referral as needed  Outcome: Progressing  Goal: Establish and maintain optimal ostomy function  Description: INTERVENTIONS:  - Assess bowel function  - Encourage oral fluids to ensure adequate hydration  - Administer IV fluids if ordered to ensure adequate hydration   - Administer ordered medications as needed  - Encourage mobilization and activity  - Nutrition services referral to assist patient with appropriate food choices  - Assess stoma site  - Consider wound care consult   Outcome: Progressing  Goal: Oral mucous membranes remain intact  Description: INTERVENTIONS  - Assess oral mucosa and hygiene practices  - Implement preventative oral hygiene regimen  - Implement oral medicated treatments as ordered  - Initiate Nutrition services referral as needed  Outcome: Progressing     Problem: GENITOURINARY - ADULT  Goal: Maintains or returns to baseline urinary function  Description: INTERVENTIONS:  - Assess urinary function  - Encourage oral fluids to ensure adequate hydration if ordered  - Administer IV fluids as ordered to ensure adequate hydration  - Administer ordered medications as  needed  - Offer frequent toileting  - Follow urinary retention protocol if ordered  Outcome: Progressing  Goal: Absence of urinary retention  Description: INTERVENTIONS:  - Assess patient’s ability to void and empty bladder  - Monitor I/O  - Bladder scan as needed  - Discuss with physician/AP medications to alleviate retention as needed  - Discuss catheterization for long term situations as appropriate  Outcome: Progressing  Goal: Urinary catheter remains patent  Description: INTERVENTIONS:  - Assess patency of urinary catheter  - If patient has a chronic jeff, consider changing catheter if non-functioning  - Follow guidelines for intermittent irrigation of non-functioning urinary catheter  Outcome: Progressing     Problem: METABOLIC, FLUID AND ELECTROLYTES - ADULT  Goal: Electrolytes maintained within normal limits  Description: INTERVENTIONS:  - Monitor labs and assess patient for signs and symptoms of electrolyte imbalances  - Administer electrolyte replacement as ordered  - Monitor response to electrolyte replacements, including repeat lab results as appropriate  - Instruct patient on fluid and nutrition as appropriate  Outcome: Progressing  Goal: Fluid balance maintained  Description: INTERVENTIONS:  - Monitor labs   - Monitor I/O and WT  - Instruct patient on fluid and nutrition as appropriate  - Assess for signs & symptoms of volume excess or deficit  Outcome: Progressing  Goal: Glucose maintained within target range  Description: INTERVENTIONS:  - Monitor Blood Glucose as ordered  - Assess for signs and symptoms of hyperglycemia and hypoglycemia  - Administer ordered medications to maintain glucose within target range  - Assess nutritional intake and initiate nutrition service referral as needed  Outcome: Progressing     Problem: SKIN/TISSUE INTEGRITY - ADULT  Goal: Skin Integrity remains intact(Skin Breakdown Prevention)  Description: Assess:  -Perform Lane assessment every   -Clean and moisturize skin  every   -Inspect skin when repositioning, toileting, and assisting with ADLS  -Assess under medical devices such as  every   -Assess extremities for adequate circulation and sensation     Bed Management:  -Have minimal linens on bed & keep smooth, unwrinkled  -Change linens as needed when moist or perspiring  -Avoid sitting or lying in one position for more than  hours while in bed  -Keep HOB at degrees     Toileting:  -Offer bedside commode  -Assess for incontinence every   -Use incontinent care products after each incontinent episode such as     Activity:  -Mobilize patient  times a day  -Encourage activity and walks on unit  -Encourage or provide ROM exercises   -Turn and reposition patient every  Hours  -Use appropriate equipment to lift or move patient in bed  -Instruct/ Assist with weight shifting every  when out of bed in chair  -Consider limitation of chair time  hour intervals    Skin Care:  -Avoid use of baby powder, tape, friction and shearing, hot water or constrictive clothing  -Relieve pressure over bony prominences using   -Do not massage red bony areas    Next Steps:  -Teach patient strategies to minimize risks such as    -Consider consults to  interdisciplinary teams such as   Outcome: Progressing  Goal: Incision(s), wounds(s) or drain site(s) healing without S/S of infection  Description: INTERVENTIONS  - Assess and document dressing, incision, wound bed, drain sites and surrounding tissue  - Provide patient and family education  - Perform skin care/dressing changes every   Outcome: Progressing  Goal: Pressure injury heals and does not worsen  Description: Interventions:  - Implement low air loss mattress or specialty surface (Criteria met)  - Apply silicone foam dressing  - Instruct/assist with weight shifting every  minutes when in chair   - Limit chair time to  hour intervals  - Use special pressure reducing interventions such as  when in chair   - Apply fecal or urinary incontinence containment  device   - Perform passive or active ROM every   - Turn and reposition patient & offload bony prominences every  hours   - Utilize friction reducing device or surface for transfers   - Consider consults to  interdisciplinary teams such as   - Use incontinent care products after each incontinent episode such as   - Consider nutrition services referral as needed  Outcome: Progressing     Problem: HEMATOLOGIC - ADULT  Goal: Maintains hematologic stability  Description: INTERVENTIONS  - Assess for signs and symptoms of bleeding or hemorrhage  - Monitor labs  - Administer supportive blood products/factors as ordered and appropriate  Outcome: Progressing     Problem: MUSCULOSKELETAL - ADULT  Goal: Maintain or return mobility to safest level of function  Description: INTERVENTIONS:  - Assess patient's ability to carry out ADLs; assess patient's baseline for ADL function and identify physical deficits which impact ability to perform ADLs (bathing, care of mouth/teeth, toileting, grooming, dressing, etc.)  - Assess/evaluate cause of self-care deficits   - Assess range of motion  - Assess patient's mobility  - Assess patient's need for assistive devices and provide as appropriate  - Encourage maximum independence but intervene and supervise when necessary  - Involve family in performance of ADLs  - Assess for home care needs following discharge   - Consider OT consult to assist with ADL evaluation and planning for discharge  - Provide patient education as appropriate  Outcome: Progressing  Goal: Maintain proper alignment of affected body part  Description: INTERVENTIONS:  - Support, maintain and protect limb and body alignment  - Provide patient/ family with appropriate education  Outcome: Progressing

## 2025-05-25 NOTE — PROGRESS NOTES
Progress Note - Hospitalist   Name: Marisol Otoole 84 y.o. female I MRN: [id number]  Unit/Bed#: 2 E 257-01 I Date of Admission: 5/20/2025   Date of Service: 5/25/2025 I Hospital Day: 5    Assessment & Plan  Acute cystitis without hematuria    Follow-up with urine cultures.  Patient's fever trend has improved.  Ceftriaxone 2 g daily.  Now with gram-negative bacteremia.  Follow-up with final cultures.  Klebsiella in the urine.  Continue Ancef and changed to Keflex on discharge  Pyelitis  Continue Rocephin  Fall  Fall at home, consult PT OT/case management  ERIN (acute kidney injury) ([facility])  I have held her Lasix,  Patient appears a little short of breath today.  I will stop the fluids.  Discussed with nephrology.  I will restart her Lasix tomorrow.  X-ray from yesterday was unremarkable  Chronic heart failure with preserved ejection fraction (HFpEF) (HCC)  Continue atenolol, lasix held due to ERIN    Wt Readings from Last 3 Encounters:   05/24/25 77.1 kg (170 lb)   05/12/25 77.1 kg (170 lb)   05/09/25 77.1 kg (170 lb)     Paroxysmal atrial fibrillation (HCC)  Continue atenolol and Coumadin  Still with supratherapeutic INR  GERD (gastroesophageal reflux disease)  Continue PPI  Acquired hypothyroidism  Continue levothyroxine  Primary hypertension  Continue Tylenol  Hyperlipidemia  Continue statin  S/P placement of cardiac pacemaker  Secondary SSS s/p MDT PPM   COPD, mild (HCC)    JANICE on CPAP  Cpap   Morbid obesity due to excess calories (HCC)  BMI 40.99  Anemia      VTE Pharmacologic Prophylaxis: VTE Score: 7 High Risk (Score >/= 5) - Pharmacological DVT Prophylaxis Ordered: warfarin (Coumadin). Sequential Compression Devices Ordered.    Mobility:   Basic Mobility Inpatient Raw Score: 16  JH-HLM Goal: 5: Stand one or more mins  JH-HLM Achieved: 6: Walk 10 steps or more  JH-HLM Goal achieved. Continue to encourage appropriate mobility.    Patient Centered Rounds: I performed bedside rounds with nursing staff today.        Education and Discussions with Family / Patient: Patient.     Current Length of Stay: 5 day(s)  Current Patient Status: Inpatient   Certification Statement: The patient will continue to require additional inpatient hospital stay due to needing to monitor respiratory status  Discharge Plan: Anticipate discharge in 24-48 hrs to home with home services.    Code Status: Level 1 - Full Code    Subjective   Patient seen and examined.  States she feels little short of breath    Objective :  Temp:  [96.9 °F (36.1 °C)-98.7 °F (37.1 °C)] 98.7 °F (37.1 °C)  HR:  [60-61] 60  BP: (140-156)/(56-69) 156/64  Resp:  [18] 18  SpO2:  [92 %-98 %] 94 %  O2 Device: Nasal cannula  Nasal Cannula O2 Flow Rate (L/min):  [3 L/min] 3 L/min    Body mass index is 40.99 kg/m².     Input and Output Summary (last 24 hours):   No intake or output data in the 24 hours ending 05/25/25 1103    Physical Exam  General Appearance:    Alert, cooperative, no distress, appears stated age                               Lungs:     Clear to auscultation bilaterally, respirations unlabored       Heart:    Regular rate and rhythm, S1 and S2 normal, no murmur, rub    or gallop   Abdomen:     Soft, non-tender, bowel sounds active all four quadrants,     no masses, no organomegaly           Extremities:   Extremities normal, atraumatic, no cyanosis or edema                         Lines/Drains:              Lab Results: I have reviewed the following results:   Results from last 7 days   Lab Units 05/25/25  1001 05/20/25  1301 05/20/25  0133   WBC Thousand/uL 10.27*   < > 12.65*   HEMOGLOBIN g/dL 8.6*   < > 9.3*   HEMATOCRIT % 28.6*   < > 30.3*   PLATELETS Thousands/uL 257   < > 318   SEGS PCT %  --   --  82*   LYMPHO PCT %  --   --  6*   MONO PCT %  --   --  10   EOS PCT %  --   --  1    < > = values in this interval not displayed.     Results from last 7 days   Lab Units 05/25/25  0442 05/20/25  1301 05/20/25  0133   SODIUM mmol/L 140   < > 136   POTASSIUM  mmol/L 4.3   < > 4.7   CHLORIDE mmol/L 109*   < > 105   CO2 mmol/L 24   < > 22   BUN mg/dL 32*   < > 42*   CREATININE mg/dL 1.26   < > 1.77*   ANION GAP mmol/L 7   < > 9   CALCIUM mg/dL 9.4   < > 9.2   ALBUMIN g/dL  --   --  3.4*   TOTAL BILIRUBIN mg/dL  --   --  0.39   ALK PHOS U/L  --   --  90   ALT U/L  --   --  20   AST U/L  --   --  32   GLUCOSE RANDOM mg/dL 97   < > 139    < > = values in this interval not displayed.     Results from last 7 days   Lab Units 05/24/25  0451   INR  2.44*             Results from last 7 days   Lab Units 05/20/25  0321   LACTIC ACID mmol/L 0.7       Recent Cultures (last 7 days):   Results from last 7 days   Lab Units 05/20/25  0325 05/20/25  0323 05/20/25  0321   BLOOD CULTURE  No Growth After 5 Days.  --  Klebsiella pneumoniae*   GRAM STAIN RESULT   --   --  Gram negative rods*   URINE CULTURE   --  >100,000 cfu/ml Klebsiella pneumoniae*  <10,000 cfu/ml Diphtheroids  --        Imaging Results Review: No pertinent imaging studies reviewed.  Other Study Results Review: No additional pertinent studies reviewed.    Last 24 Hours Medication List:     Current Facility-Administered Medications:     acetaminophen (TYLENOL) tablet 650 mg, Q6H PRN    atenolol (TENORMIN) tablet 25 mg, Daily    ceFAZolin (ANCEF) IVPB (premix in dextrose) 1,000 mg 50 mL, Q8H, Last Rate: 1,000 mg (05/25/25 0818)    furosemide (LASIX) tablet 20 mg, Daily    levothyroxine tablet 50 mcg, Early Morning    oxybutynin (DITROPAN-XL) 24 hr tablet 5 mg, Daily    pantoprazole (PROTONIX) EC tablet 40 mg, Early Morning    pravastatin (PRAVACHOL) tablet 80 mg, Daily With Dinner    warfarin (COUMADIN) tablet 2.5 mg, Daily (warfarin)    Administrative Statements   Today, Patient Was Seen By: Cristian Escalante MD  I have spent a total time of 25 minutes in caring for this patient on the day of the visit/encounter including Diagnostic results, Risks and benefits of tx options, Instructions for management, Patient and family  education, Importance of tx compliance, Risk factor reductions, Impressions, Documenting in the medical record, Reviewing/placing orders in the medical record (including tests, medications, and/or procedures), Obtaining or reviewing history  , and Communicating with other healthcare professionals .    **Please Note: This note may have been constructed using a voice recognition system.**

## 2025-05-25 NOTE — ASSESSMENT & PLAN NOTE
Noted to have presence of cystitis on imaging along with pyelitis with urine culture growing Klebsiella pneumonia.  Currently maintained on cefazolin.  Management per primary team.

## 2025-05-25 NOTE — ASSESSMENT & PLAN NOTE
Continue atenolol,     Wt Readings from Last 3 Encounters:   05/24/25 77.1 kg (170 lb)   05/12/25 77.1 kg (170 lb)   05/09/25 77.1 kg (170 lb)   Continue Lasix

## 2025-05-25 NOTE — ASSESSMENT & PLAN NOTE
Follow-up with urine cultures.  Patient's fever trend has improved.  Ceftriaxone 2 g daily.  Now with gram-negative bacteremia.  Follow-up with final cultures.  Klebsiella in the urine.  Changed to Keflex on discharge

## 2025-05-26 VITALS
RESPIRATION RATE: 19 BRPM | HEART RATE: 60 BPM | SYSTOLIC BLOOD PRESSURE: 144 MMHG | TEMPERATURE: 97.2 F | WEIGHT: 170 LBS | HEIGHT: 55 IN | DIASTOLIC BLOOD PRESSURE: 71 MMHG | OXYGEN SATURATION: 96 % | BODY MASS INDEX: 39.34 KG/M2

## 2025-05-26 LAB
ANION GAP SERPL CALCULATED.3IONS-SCNC: 5 MMOL/L (ref 4–13)
BUN SERPL-MCNC: 27 MG/DL (ref 5–25)
CALCIUM SERPL-MCNC: 9.4 MG/DL (ref 8.4–10.2)
CHLORIDE SERPL-SCNC: 108 MMOL/L (ref 96–108)
CO2 SERPL-SCNC: 28 MMOL/L (ref 21–32)
CREAT SERPL-MCNC: 1.11 MG/DL (ref 0.6–1.3)
GFR SERPL CREATININE-BSD FRML MDRD: 45 ML/MIN/1.73SQ M
GLUCOSE SERPL-MCNC: 83 MG/DL (ref 65–140)
POTASSIUM SERPL-SCNC: 4.1 MMOL/L (ref 3.5–5.3)
SODIUM SERPL-SCNC: 141 MMOL/L (ref 135–147)

## 2025-05-26 PROCEDURE — 80048 BASIC METABOLIC PNL TOTAL CA: CPT | Performed by: FAMILY MEDICINE

## 2025-05-26 PROCEDURE — 99239 HOSP IP/OBS DSCHRG MGMT >30: CPT | Performed by: FAMILY MEDICINE

## 2025-05-26 PROCEDURE — 99232 SBSQ HOSP IP/OBS MODERATE 35: CPT | Performed by: INTERNAL MEDICINE

## 2025-05-26 PROCEDURE — 94760 N-INVAS EAR/PLS OXIMETRY 1: CPT

## 2025-05-26 RX ORDER — CEPHALEXIN 500 MG/1
500 CAPSULE ORAL EVERY 6 HOURS SCHEDULED
Qty: 20 CAPSULE | Refills: 0 | Status: SHIPPED | OUTPATIENT
Start: 2025-05-26 | End: 2025-05-31

## 2025-05-26 RX ORDER — CEPHALEXIN 500 MG/1
500 CAPSULE ORAL EVERY 8 HOURS SCHEDULED
Qty: 18 CAPSULE | Refills: 0 | Status: CANCELLED | OUTPATIENT
Start: 2025-05-26 | End: 2025-06-01

## 2025-05-26 RX ADMIN — ATENOLOL 25 MG: 25 TABLET ORAL at 08:23

## 2025-05-26 RX ADMIN — FUROSEMIDE 20 MG: 20 TABLET ORAL at 08:23

## 2025-05-26 RX ADMIN — OXYBUTYNIN CHLORIDE 5 MG: 5 TABLET, EXTENDED RELEASE ORAL at 08:23

## 2025-05-26 RX ADMIN — CEFAZOLIN SODIUM 1000 MG: 1 SOLUTION INTRAVENOUS at 08:23

## 2025-05-26 RX ADMIN — LEVOTHYROXINE SODIUM 50 MCG: 0.05 TABLET ORAL at 05:42

## 2025-05-26 RX ADMIN — PANTOPRAZOLE SODIUM 40 MG: 40 TABLET, DELAYED RELEASE ORAL at 05:42

## 2025-05-26 NOTE — ASSESSMENT & PLAN NOTE
After review of the medical record, it appears that previously known baseline creatinine level was fluctuating at around 0.6-0.9.  She presented to our facility with a creatinine level of 1.77 worsening to a peak of 2.09 on 5/22/2025.    Renal ultrasound on 5/22/2025 noted bilateral simple renal cyst without evidence of hydronephrosis.  Urinalysis on 5/20/2025 noted hematuria with numerous WBCs, urine culture positive for Klebsiella and hematuria suspected secondary to underlying urinary tract infection.  Patient is currently receiving cefazolin, management per primary team.    Etiology is multifactorial and suspected secondary to underlying ATN in the setting of sepsis, patient also underwent CT scan with IV contrast on 5/20/2025 and underlying contrast-induced nephropathy on top of ATN cannot be ruled out.    Patient was provided with IV fluids with noted improvement in creatinine levels.  IV fluids were discontinued Saturday due to development of shortness of breath and she was restarted on her once daily furosemide yesterday.  Creatinine level has continued to improve to a current reading of 1.11.    Patient okay for discharge from a nephrology standpoint.  Will continue monitor daily BMP during hospitalization.

## 2025-05-26 NOTE — ASSESSMENT & PLAN NOTE
Most recent hemoglobin was 8.6, no signs of active bleeding.  Recommend blood transfusion for hemoglobin less than 7.

## 2025-05-26 NOTE — PLAN OF CARE
Problem: Potential for Falls  Goal: Patient will remain free of falls  Description: INTERVENTIONS:  - Educate patient/family on patient safety including physical limitations  - Instruct patient to call for assistance with activity   - Consider consulting OT/PT to assist with strengthening/mobility based on AM PAC & JH-HLM score  - Consult OT/PT to assist with strengthening/mobility   - Keep Call bell within reach  - Keep bed low and locked with side rails adjusted as appropriate  - Keep care items and personal belongings within reach  - Initiate and maintain comfort rounds  - Make Fall Risk Sign visible to staff  - Offer Toileting every 2 Hours, in advance of need  - Initiate/Maintain bed/chair alarm  - Obtain necessary fall risk management equipment:   - Apply yellow socks and bracelet for high fall risk patients  - Consider moving patient to room near nurses station  Outcome: Progressing

## 2025-05-26 NOTE — ASSESSMENT & PLAN NOTE
Currently maintained on atenolol 25 mg daily.  Blood pressure readings within acceptable range on the current regimen.

## 2025-05-26 NOTE — PROGRESS NOTES
Progress Note - Nephrology   Name: Marisol Otoole 84 y.o. female I MRN: [id number]  Unit/Bed#: 2 E 257-01 I Date of Admission: 5/20/2025   Date of Service: 5/26/2025 I Hospital Day: 6     Assessment & Plan  ERIN (acute kidney injury) (HCC)  After review of the medical record, it appears that previously known baseline creatinine level was fluctuating at around 0.6-0.9.  She presented to our facility with a creatinine level of 1.77 worsening to a peak of 2.09 on 5/22/2025.    Renal ultrasound on 5/22/2025 noted bilateral simple renal cyst without evidence of hydronephrosis.  Urinalysis on 5/20/2025 noted hematuria with numerous WBCs, urine culture positive for Klebsiella and hematuria suspected secondary to underlying urinary tract infection.  Patient is currently receiving cefazolin, management per primary team.    Etiology is multifactorial and suspected secondary to underlying ATN in the setting of sepsis, patient also underwent CT scan with IV contrast on 5/20/2025 and underlying contrast-induced nephropathy on top of ATN cannot be ruled out.    Patient was provided with IV fluids with noted improvement in creatinine levels.  IV fluids were discontinued Saturday due to development of shortness of breath and she was restarted on her once daily furosemide yesterday.  Creatinine level has continued to improve to a current reading of 1.11.    Patient okay for discharge from a nephrology standpoint.  Will continue monitor daily BMP during hospitalization.  Primary hypertension  Currently maintained on atenolol 25 mg daily.  Blood pressure readings within acceptable range on the current regimen.  Anemia  Most recent hemoglobin was 8.6, no signs of active bleeding.  Recommend blood transfusion for hemoglobin less than 7.  Acute cystitis without hematuria  Noted to have presence of cystitis on imaging along with pyelitis with urine culture growing Klebsiella pneumonia.  Currently maintained on cefazolin.  Management per  primary team.    I have reviewed the nephrology recommendations including renal function monitoring, with SLIM, and we are in agreement with renal plan including the information outlined above. I have discussed the above management plan in detail with the primary service.   Ok for discharge from Nephrology service perspective.    Subjective   Brief History of Admission - 84-year-old female with a past medical history significant for sick sinus syndrome status post pacemaker, CHF, atrial fibrillation, hypertension, and JANICE who presented to our facility after a fall from home.     Seen and evaluated at the bedside today, no acute complaints.  Hopeful that she can go home.    Objective :  Temp:  [97.2 °F (36.2 °C)-99.2 °F (37.3 °C)] 97.2 °F (36.2 °C)  HR:  [57-62] 60  BP: (141-158)/(64-71) 144/71  Resp:  [19] 19  SpO2:  [91 %-98 %] 96 %  O2 Device: Nasal cannula  Nasal Cannula O2 Flow Rate (L/min):  [3 L/min] 3 L/min  FiO2 (%):  [3] 3    Current Weight: Weight - Scale: 77.1 kg (170 lb)  First Weight: Weight - Scale: 77.1 kg (170 lb)  I/O         05/24 0701  05/25 0700 05/25 0701  05/26 0700 05/26 0701  05/27 0700    P.O.   350    I.V. (mL/kg) 750 (9.7)      Total Intake(mL/kg) 750 (9.7)  350 (4.5)    Urine (mL/kg/hr)   100 (0.5)    Total Output   100    Net +750  +250           Unmeasured Urine Occurrence 2 x 3 x           Physical Exam  Vitals and nursing note reviewed.   Constitutional:       General: She is not in acute distress.     Appearance: She is well-developed.   HENT:      Head: Normocephalic and atraumatic.     Eyes:      Conjunctiva/sclera: Conjunctivae normal.       Cardiovascular:      Rate and Rhythm: Normal rate and regular rhythm.   Pulmonary:      Effort: Pulmonary effort is normal. No respiratory distress.      Breath sounds: Normal breath sounds.      Comments: On nasal cannula   Abdominal:      Palpations: Abdomen is soft.      Tenderness: There is no abdominal tenderness.     Musculoskeletal:      " Cervical back: Neck supple.     Skin:     General: Skin is warm and dry.     Neurological:      Mental Status: She is alert.     Psychiatric:         Mood and Affect: Mood normal.         Medications:  Current Medications[1]      Lab Results: I have reviewed the following results:  Results from last 7 days   Lab Units 05/26/25  0524 05/25/25  1001 05/25/25  0442 05/24/25  0451 05/23/25  0555 05/22/25  0551 05/21/25  0624 05/20/25  1301 05/20/25  0133   WBC Thousand/uL  --  10.27*  --  8.88  --  9.14 10.96* 14.18* 12.65*   HEMOGLOBIN g/dL  --  8.6*  --  8.1*  --  8.7* 8.6* 9.5* 9.3*   HEMATOCRIT %  --  28.6*  --  27.3*  --  29.1* 27.7* 31.9* 30.3*   PLATELETS Thousands/uL  --  257  --  266  --  270 256 324 318   POTASSIUM mmol/L 4.1  --  4.3 4.4 4.3 4.0 4.1 4.5 4.7   CHLORIDE mmol/L 108  --  109* 110* 108 109* 108 107 105   CO2 mmol/L 28  --  24 22 23 22 21 24 22   BUN mg/dL 27*  --  32* 40* 47* 48* 44* 37* 42*   CREATININE mg/dL 1.11  --  1.26 1.56* 2.01* 2.09* 1.92* 1.54* 1.77*   CALCIUM mg/dL 9.4  --  9.4 8.7 9.0 8.7 8.7 9.3 9.2   ALBUMIN g/dL  --   --   --   --   --   --   --   --  3.4*       Administrative Statements     Portions of the record may have been created with voice recognition software. Occasional wrong word or \"sound a like\" substitutions may have occurred due to the inherent limitations of voice recognition software. Read the chart carefully and recognize, using context, where substitutions have occurred.If you have any questions, please contact the dictating provider.       [1]   Current Facility-Administered Medications:     acetaminophen (TYLENOL) tablet 650 mg, 650 mg, Oral, Q6H PRN, Cristian Chema Escalante, MD, 650 mg at 05/25/25 1710    atenolol (TENORMIN) tablet 25 mg, 25 mg, Oral, Daily, Wilfred Wolf MD, 25 mg at 05/26/25 0823    calcium carbonate (TUMS) chewable tablet 500 mg, 500 mg, Oral, BID PRN, Li Earl PA-C, 500 mg at 05/25/25 2116    ceFAZolin (ANCEF) IVPB (premix in dextrose) " 1,000 mg 50 mL, 1,000 mg, Intravenous, Q8H, Cristian Escalante MD, Last Rate: 100 mL/hr at 05/26/25 0823, 1,000 mg at 05/26/25 0823    furosemide (LASIX) tablet 20 mg, 20 mg, Oral, Daily, Cristian Escalante MD, 20 mg at 05/26/25 0823    levothyroxine tablet 50 mcg, 50 mcg, Oral, Early Morning, Fernando Escalante MD, 50 mcg at 05/26/25 0542    oxybutynin (DITROPAN-XL) 24 hr tablet 5 mg, 5 mg, Oral, Daily, MABEL Israel, 5 mg at 05/26/25 0823    pantoprazole (PROTONIX) EC tablet 40 mg, 40 mg, Oral, Early Morning, Fernando Escalante MD, 40 mg at 05/26/25 0542    pravastatin (PRAVACHOL) tablet 80 mg, 80 mg, Oral, Daily With Dinner, Fernando Escalante MD, 80 mg at 05/25/25 1710    warfarin (COUMADIN) tablet 2.5 mg, 2.5 mg, Oral, Daily (warfarin), Cristian Escalante MD, 2.5 mg at 05/25/25 1710

## 2025-05-27 ENCOUNTER — TELEPHONE (OUTPATIENT)
Dept: NEPHROLOGY | Facility: CLINIC | Age: 85
End: 2025-05-27

## 2025-05-27 ENCOUNTER — HOSPITAL ENCOUNTER (EMERGENCY)
Facility: HOSPITAL | Age: 85
Discharge: HOME | End: 2025-05-27
Attending: EMERGENCY MEDICINE
Payer: MEDICARE

## 2025-05-27 ENCOUNTER — TELEPHONE (OUTPATIENT)
Dept: CARDIOLOGY CLINIC | Facility: CLINIC | Age: 85
End: 2025-05-27

## 2025-05-27 ENCOUNTER — TRANSITIONAL CARE MANAGEMENT (OUTPATIENT)
Dept: FAMILY MEDICINE CLINIC | Facility: CLINIC | Age: 85
End: 2025-05-27

## 2025-05-27 VITALS
HEART RATE: 60 BPM | DIASTOLIC BLOOD PRESSURE: 74 MMHG | TEMPERATURE: 97.9 F | RESPIRATION RATE: 16 BRPM | OXYGEN SATURATION: 100 % | SYSTOLIC BLOOD PRESSURE: 181 MMHG

## 2025-05-27 DIAGNOSIS — R53.1 GENERALIZED WEAKNESS: Primary | ICD-10-CM

## 2025-05-27 DIAGNOSIS — N17.9 ACUTE RENAL FAILURE, UNSPECIFIED ACUTE RENAL FAILURE TYPE (HCC): Primary | ICD-10-CM

## 2025-05-27 PROCEDURE — 99284 EMERGENCY DEPT VISIT MOD MDM: CPT

## 2025-05-27 PROCEDURE — 99283 EMERGENCY DEPT VISIT LOW MDM: CPT | Performed by: EMERGENCY MEDICINE

## 2025-05-27 NOTE — CASE MANAGEMENT
Case Management Discharge Planning Note    Patient name Marisol Otoole  Location ED 07/ED 07 MRN [id number]  : 1940 Date 2025       Current Admission Date: 2025  Current Admission Diagnosis:Weakness   Patient Active Problem List    Diagnosis Date Noted    Anemia 2025    Acute cystitis without hematuria 2025    Pyelitis 2025    SVT (supraventricular tachycardia) ([facility]) 2025    NSVT (nonsustained ventricular tachycardia) ([facility]) 2025    Heart palpitations 04/10/2025    Supratherapeutic INR 2025    SSS (sick sinus syndrome) ([facility]) 2024    S/P placement of cardiac pacemaker 2024    Non-rheumatic aortic stenosis 2024    Nonrheumatic tricuspid valve regurgitation 2024    Presence of permanent cardiac pacemaker 2024    Paroxysmal atrial fibrillation ([facility]) 2024    Junctional bradycardia 2024    Anticoagulant long-term use 2024    Walker as ambulation aid 2024    Ambulatory dysfunction 10/24/2023    Fall 2023    ERIN (acute kidney injury) ([facility]) 2023    Orbital mass 2023    Radiculopathy, lumbar region 07/10/2021    History of depression 2021    Osteopenia 10/22/2020    Dyspnea 2020    Insomnia 2020    History of transcatheter aortic valve replacement (TAVR) 10/09/2018    Primary osteoarthritis of left shoulder     AVB (atrioventricular block)     Chronic heart failure with preserved ejection fraction (HFpEF) ([facility])     COPD, mild ([facility])     Coronary artery disease involving native coronary artery of native heart without angina pectoris     JANICE on CPAP     Iron deficiency anemia secondary to inadequate dietary iron intake 2018    Morbid obesity due to excess calories ([facility]) 2018    Hyperlipidemia 2017    Primary hypertension 2017    GERD (gastroesophageal reflux disease) 10/29/2017    Acquired hypothyroidism 10/29/2017    LVH (left ventricular hypertrophy) 2016     Mitral annular calcification 09/22/2016    Moderate mitral valve stenosis 09/22/2016    Pulmonary hypertension (HCC) 09/22/2016      LOS (days): 0  Geometric Mean LOS (GMLOS) (days):   Days to GMLOS:     OBJECTIVE:         Current admission status: Emergency   Preferred Pharmacy:   [id number] [place] - [address].  [address]  [facility] [address]  Phone: [phone] Fax: [phone]    [facility], [place] - [address] [facility]  [address] [id number]  Phone: [phone] Fax: [phone]    Primary Care Provider: MABEL Hallman    Primary Insurance: MEDICARE  Secondary Insurance:     DISCHARGE DETAILS:    Discharge planning discussed with:: patient at bedside, PILAR Shi via phone  Freedom of Choice: Yes  Comments - Freedom of Choice: Patient has been accepted to [facility]. She is in her copay days and cost is $208/day. CM let patient and POA know and they are both aware and would like for patient to dc to [facility] today. PILAR Shi reports someone will bring patient's CPAP before she goes to [facility]. No other CM needs at this time. Charge and bedside RN made aware that patient is ready for dc to [facility] and requested that they make transportation arrangements.  CM contacted family/caregiver?: Yes  Were Treatment Team discharge recommendations reviewed with patient/caregiver?: Yes  Did patient/caregiver verbalize understanding of patient care needs?: N/A- going to facility  Were patient/caregiver advised of the risks associated with not following Treatment Team discharge recommendations?: Yes    Contacts  Patient Contacts: PILAR/Yuridia woodruff  Relationship to Patient:: Friend  Contact Method: Phone  Reason/Outcome: Continuity of Care, Discharge Planning    Requested Home Health Care         Is the patient interested in C at discharge?: No     Other Referral/Resources/Interventions  Provided:  Interventions: Short Term Rehab  Referral Comments: [facility] reserved in [facility]     Treatment Team Recommendation: Short Term Rehab  Discharge Destination Plan:: Short Term Rehab  Transport at Discharge : [facility] Ambulance          Accepting Facility Name, City & State : [facility]  Receiving Facility/Agency Phone Number: [phone]

## 2025-05-27 NOTE — TELEPHONE ENCOUNTER
Hi,  Requesting a pt/inr home draw to be done sometime this week. Please call the pt to set up. Script is in the chart.     Thank you.

## 2025-05-27 NOTE — ED NOTES
Patient is D/C ready, transport arrange via round trip for a BLS, NO ETA at the moment. All parties made aware.      Digna Gonzalez RN  05/27/25 1917

## 2025-05-27 NOTE — TELEPHONE ENCOUNTER
Per previous message I called patient and reminded patient to have BMP done 3 to 5 days prior to appointment. Per patient she stated she will call [facility] to have lab work done. . Reminded patient about appointment schedule for06/17/25 @ 4:30pm with Kalpana

## 2025-05-27 NOTE — CASE MANAGEMENT
Case Management Discharge Planning Note    Patient name Marisol Otoole  Location ED 07/ED 07 MRN [id number]  : 1940 Date 2025       Current Admission Date: 2025  Current Admission Diagnosis:Weakness   Patient Active Problem List    Diagnosis Date Noted    Anemia 2025    Acute cystitis without hematuria 2025    Pyelitis 2025    SVT (supraventricular tachycardia) ([facility]) 2025    NSVT (nonsustained ventricular tachycardia) ([facility]) 2025    Heart palpitations 04/10/2025    Supratherapeutic INR 2025    SSS (sick sinus syndrome) ([facility]) 2024    S/P placement of cardiac pacemaker 2024    Non-rheumatic aortic stenosis 2024    Nonrheumatic tricuspid valve regurgitation 2024    Presence of permanent cardiac pacemaker 2024    Paroxysmal atrial fibrillation ([facility]) 2024    Junctional bradycardia 2024    Anticoagulant long-term use 2024    Walker as ambulation aid 2024    Ambulatory dysfunction 10/24/2023    Fall 2023    ERIN (acute kidney injury) ([facility]) 2023    Orbital mass 2023    Radiculopathy, lumbar region 07/10/2021    History of depression 2021    Osteopenia 10/22/2020    Dyspnea 2020    Insomnia 2020    History of transcatheter aortic valve replacement (TAVR) 10/09/2018    Primary osteoarthritis of left shoulder     AVB (atrioventricular block)     Chronic heart failure with preserved ejection fraction (HFpEF) ([facility])     COPD, mild ([facility])     Coronary artery disease involving native coronary artery of native heart without angina pectoris     JANICE on CPAP     Iron deficiency anemia secondary to inadequate dietary iron intake 2018    Morbid obesity due to excess calories ([facility]) 2018    Hyperlipidemia 2017    Primary hypertension 2017    GERD (gastroesophageal reflux disease) 10/29/2017    Acquired hypothyroidism 10/29/2017    LVH (left ventricular hypertrophy) 2016     Mitral annular calcification 09/22/2016    Moderate mitral valve stenosis 09/22/2016    Pulmonary hypertension (HCC) 09/22/2016      LOS (days): 0  Geometric Mean LOS (GMLOS) (days):   Days to GMLOS:     OBJECTIVE:            Current admission status: Emergency   Preferred Pharmacy:   [id number] [place] - [address].  [address]  [place] [address]  Phone: [phone] Fax: [phone]    [facility] [place] - [address] [id number] [place] [address]  Phone: [phone] Fax: [phone]    Primary Care Provider: MABEL Hallman    Primary Insurance: MEDICARE  Secondary Insurance:     DISCHARGE DETAILS:    Discharge planning discussed with:: patient at bedside  Freedom of Choice: Yes  Comments - Freedom of Choice: Patient choice list provided from [employer]; there are 7 accepting facilities at this time. Patient reports her preference is [facility] as she has been there before. CM called and [facility] for admissions and also sent a message in [employer] asking for an admission determination. Patient will review the patient choice list and will select a back up facility that she can dc to today in the event that [facility] is not able to offer a bed for any reason. CM Dept will follow up with patient once [facility] responds. Patient did report that if she goes to a local facility, someone can bring her CPAP machine today. Patient is not interested in [facility].  CM contacted family/caregiver?: No- see comments (CM to update patient's friend after facility is identified for dc.)  Were Treatment Team discharge recommendations reviewed with patient/caregiver?: Yes  Did patient/caregiver verbalize understanding of patient care needs?: N/A- going to facility  Were patient/caregiver advised of the risks associated with not following Treatment Team discharge recommendations?: Yes     Other Referral/Resources/Interventions  Provided:  Interventions: Short Term Rehab  Referral Comments: Patient choice list provided. Awaiting response from [facility] as they are patient's preferred facility.       Treatment Team Recommendation: Short Term Rehab  Discharge Destination Plan:: Short Term Rehab  Transport at Discharge : [facility]

## 2025-05-27 NOTE — ED PROVIDER NOTES
ED Disposition       None          Assessment & Plan       Medical Decision Making  Patient is an 84-year-old female past medical history of pulmonary hypertension, hypertension, hyperlipidemia, hypothyroid, CAD, COPD, GERD, SVT, sick sinus syndrome, CHF, atrial fibrillation presenting for rehab placement.  Patient is well-appearing at bedside with stable vitals and in no acute distress.  She has lung clear to auscultation and on her home 4 L, no abdominal tenderness and no other significant physical exam findings.  Will consult case management for further evaluation.             Medications - No data to display    ED Risk Strat Scores                    (ISAR) Identification of Seniors at Risk  Before the illness or injury that brought you to the Emergency, did you need someone to help you on a regular basis?: 1  In the last 24 hours, have you needed more help than usual?: 1  Have you been hospitalized for one or more nights during the past 6 months?: 1  In general, do you see well?: 1  In general, do you have serious problems with your memory?: 0  Do you take more than three different medications every day?: 1  ISAR Score: 5            SBIRT 22yo+      Flowsheet Row Most Recent Value   Initial Alcohol Screen: US AUDIT-C     1. How often do you have a drink containing alcohol? 0 Filed at: 05/27/2025 1233   2. How many drinks containing alcohol do you have on a typical day you are drinking?  0 Filed at: 05/27/2025 1233   3a. Male UNDER 65: How often do you have five or more drinks on one occasion? 0 Filed at: 05/27/2025 1233   3b. FEMALE Any Age, or MALE 65+: How often do you have 4 or more drinks on one occassion? 0 Filed at: 05/27/2025 1233   Audit-C Score 0 Filed at: 05/27/2025 1233   SHAWN: How many times in the past year have you...    Used an illegal drug or used a prescription medication for non-medical reasons? Never Filed at: 05/27/2025 1233                            History of Present Illness       Chief  Complaint   Patient presents with    Weakness - Generalized     Recently dced after a uti admit, refused snf or rehab but [facility] says its a must, pt too weak at home, normally 4L NC        Past Medical History[1]   Past Surgical History[2]   Family History[3]   Social History[4]   E-Cigarette/Vaping    E-Cigarette Use Never User       E-Cigarette/Vaping Substances    Nicotine No     THC No     CBD No     Flavoring No     Other No     Unknown No       I have reviewed and agree with the history as documented.     Patient is an 84-year-old female past medical history of pulmonary hypertension, hypothyroid, hypertension, hyperlipidemia, atrial fibrillation, CHF, CAD, COPD, GERD, SVT, sick sinus syndrome presenting for rehab placement.  Patient was discharged yesterday following admission for UTI and was discharged with home health services after refusing rehab.  States she was evaluated by home health today and was told that she would need rehab.  States that she walks with a walker but is very weak at home.  States that this is unchanged from yesterday.  Notes nausea but denies any vomiting or diarrhea, abdominal pain, fevers, shortness of breath, chest pain.  Is currently on antibiotics for UTI.  Denies any change in her status since yesterday.  Denies any falls but states she almost fell yesterday.        Review of Systems   All other systems reviewed and are negative.          Objective       ED Triage Vitals [05/27/25 1232]   Temperature Pulse Blood Pressure Respirations SpO2 Patient Position - Orthostatic VS   97.9 °F (36.6 °C) 60 154/69 16 100 % --      Temp src Heart Rate Source BP Location FiO2 (%) Pain Score    -- -- -- -- --      Vitals      Date and Time Temp Pulse SpO2 Resp BP Pain Score FACES Pain Rating User   05/27/25 1232 97.9 °F (36.6 °C) 60 100 % 16 154/69 -- -- AR            Physical Exam  Vitals reviewed.   Constitutional:       General: She is not in acute distress.     Appearance: Normal  appearance. She is not ill-appearing.   HENT:      Mouth/Throat:      Mouth: Mucous membranes are moist.     Eyes:      Conjunctiva/sclera: Conjunctivae normal.       Cardiovascular:      Rate and Rhythm: Normal rate and regular rhythm.      Pulses: Normal pulses.      Heart sounds: Normal heart sounds.   Pulmonary:      Effort: Pulmonary effort is normal.      Breath sounds: Normal breath sounds.   Abdominal:      General: Abdomen is flat.      Palpations: Abdomen is soft.      Tenderness: There is no abdominal tenderness.     Musculoskeletal:         General: No swelling. Normal range of motion.      Cervical back: Neck supple.     Skin:     General: Skin is warm and dry.     Neurological:      General: No focal deficit present.      Mental Status: She is alert.     Psychiatric:         Mood and Affect: Mood normal.         Results Reviewed       None            No orders to display       Procedures    ED Medication and Procedure Management   Prior to Admission Medications   Prescriptions Last Dose Informant Patient Reported? Taking?   Blood Glucose Monitoring Suppl (OneTouch Verio Reflect) w/Device KIT  Self No No   Sig: Check blood sugars once daily. Please substitute with appropriate alternative as covered by patient's insurance. Dx: E11.65   Calcium Carb-Cholecalciferol (CALCIUM 600 + D PO)  Self Yes No   Sig: Take 1 tablet by mouth 2 (two) times a day   Cranberry 250 MG TABS  Self Yes No   Sig: Take by mouth   Lancets (onetouch ultrasoft) lancets  Self No No   Sig: Use in the morning Use as instructed   OneTouch Delica Lancets 33G MISC  Self No No   Sig: Check blood sugars once daily. Please substitute with appropriate alternative as covered by patient's insurance. Dx: E11.65   acetaminophen (TYLENOL) 500 mg tablet  Self Yes No   Sig: Take 500 mg by mouth every 6 (six) hours as needed   aspirin (ECOTRIN LOW STRENGTH) 81 mg EC tablet  Self No No   Sig: Take 1 tablet (81 mg total) by mouth daily   atenolol  (TENORMIN) 25 mg tablet  Self No No   Sig: Take 1 tablet (25 mg total) by mouth daily   b complex vitamins capsule  Self Yes No   Sig: Take 1 capsule by mouth 2 (two) times a day     benzonatate (TESSALON PERLES) 100 mg capsule  Self No No   Sig: Take 1 capsule (100 mg total) by mouth 3 (three) times a day as needed for cough   cephalexin (KEFLEX) 500 mg capsule   No No   Sig: Take 1 capsule (500 mg total) by mouth every 6 (six) hours for 5 days   fluticasone (FLONASE) 50 mcg/act nasal spray  Self No No   Si spray into each nostril daily   furosemide (LASIX) 40 mg tablet  Self No No   Sig: TAKE 1 TABLET BY MOUTH EVERY DAY *NEW PRESCRIPTION REQUEST*   glucose blood (OneTouch Verio) test strip  Self No No   Sig: Check blood sugars once daily. Please substitute with appropriate alternative as covered by patient's insurance. Dx: E11.65   levothyroxine 50 mcg tablet  Self No No   Sig: TAKE 1 TABLET BY MOUTH EVERY DAY *NEW PRESCRIPTION REQUEST*   nystatin (MYCOSTATIN) powder  Self No No   Sig: Apply topically 2 (two) times a day   omeprazole (PriLOSEC) 40 MG capsule  Self No No   Sig: TAKE 1 CAPSULE BY MOUTH TWICE A DAY *NEW PRESCRIPTION REQUEST*   oxybutynin (DITROPAN-XL) 5 mg 24 hr tablet  Self No No   Sig: TAKE 1 TABLET (5 MG TOTAL) BY MOUTH DAILY. *NEW PRESCRIPTION REQUEST*   sertraline (ZOLOFT) 100 mg tablet  Self No No   Sig: TAKE 1 TABLET BY MOUTH EVERY DAY *NEW PRESCRIPTION REQUEST*   simvastatin (ZOCOR) 40 mg tablet  Self No No   Sig: TAKE 1 TABLET BY MOUTH EVERYDAY AT BEDTIME *NEW PRESCRIPTION REQUEST*   warfarin (COUMADIN) 2.5 mg tablet  Self No No   Sig: Take 1 tablet (2.5mg) Mon-Sat. Take 2 tablets (5mg) on Sun or as directed      Facility-Administered Medications: None     Patient's Medications   Discharge Prescriptions    No medications on file     No discharge procedures on file.  ED SEPSIS DOCUMENTATION              [1]   Past Medical History:  Diagnosis Date    Anemia 2018    Anxiety      Arthritis     AVB (atrioventricular block)     first degree    Cataract     CHF (congestive heart failure) (HCC)     COPD (chronic obstructive pulmonary disease) (HCC)     COPD, mild (HCC)     Coronary artery disease     Dislocation of right shoulder joint     Frequent UTI     GERD (gastroesophageal reflux disease)     H/O: pneumonia     Heme positive stool     Hyperlipidemia     Hypertension     Hypothyroidism     Morbid obesity with BMI of 50.0-59.9, adult ([facility])     Obesity, morbid ([facility]) 08/22/2018    JANICE on CPAP     Pulmonary hypertension ([facility]) 08/22/2018    Severe aortic stenosis     Simple goiter     Skin cyst     within the armpits, right    Wears glasses    [2]   Past Surgical History:  Procedure Laterality Date    BREAST BIOPSY      CARDIAC CATHETERIZATION      CARDIAC ELECTROPHYSIOLOGY PROCEDURE N/A 8/12/2024    Procedure: Cardiac pacer implant;  Surgeon: Clarke Zuluaga MD;  Location: [facility];  Service: Cardiology    CARPAL TUNNEL RELEASE Bilateral     CHOLECYSTECTOMY      DILATION AND CURETTAGE OF UTERUS      HYSTEROSCOPY      MASTOID SURGERY      CO COLONOSCOPY FLX DX W/COLLJ SPEC WHEN PFRMD N/A 9/6/2018    Procedure: COLONOSCOPY;  Surgeon: Shree Sosa III, MD;  Location: [facility];  Service: Gastroenterology    CO ECHO TRANSESOPHAG R-T 2D W/PRB IMG ACQUISJ I&R N/A 10/9/2018    Procedure: INTRA-OP TRANSESOPHAGEAL ECHOCARDIOGRAM (GARRISON);  Surgeon: Kushal Camarena DO;  Location: [facility];  Service: Cardiac Surgery    CO ESOPHAGOGASTRODUODENOSCOPY TRANSORAL DIAGNOSTIC N/A 8/31/2018    Procedure: ESOPHAGOGASTRODUODENOSCOPY (EGD);  Surgeon: Shree Sosa III, MD;  Location: [facility];  Service: Gastroenterology    CO REPLACE AORTIC VALVE OPENFEMORAL ARTERY APPROACH N/A 10/9/2018    Procedure: REPLACEMENT AORTIC VALVE TRANSCATHETER (TAVR) TRANSFEMORAL W/ 23 MM MENDOZA NOE S3 VALVE (ACCESS OF LEFT);  Surgeon: Kushal Camarena DO;  Location: [facility];  Service: Cardiac Surgery    TOTAL  HIP ARTHROPLASTY Left 2007    TOTAL KNEE ARTHROPLASTY Bilateral    [3]   Family History  Problem Relation Name Age of Onset    Diabetes Mother      Stroke Mother      Cancer Father      Lung cancer Father      Diabetes Sister      Heart disease Sister      Hypertension Sister      Coronary artery disease Family      Diabetes Family      Hypertension Family      Cancer Family      Stroke Family      Thyroid disease Neg Hx     [4]   Social History  Tobacco Use    Smoking status: Never     Passive exposure: Never    Smokeless tobacco: Never   Vaping Use    Vaping status: Never Used   Substance Use Topics    Alcohol use: Not Currently    Drug use: Never        Cyn Ortega DO  05/27/25 [id number]

## 2025-05-27 NOTE — CASE MANAGEMENT
Case Management ED Assessment & Discharge Planning Note    Patient name Marisol Otoole  Location ED 07/ED 07 MRN [id number]  : 1940 Date 2025        OBJECTIVE:  Predictive Model Details          92%  Factor Value    Risk of Hospital Admission or ED Visit Model 29% Number of ED Visits 2     24% Number of Hospitalizations 5+     9% Is in Relationship No     7% Has Atrial Fibrillation Yes     6% Has COPD Yes     6% Has Anemia Yes     5% Has Medicare Yes     5% Has CVD Yes     5% Has Depression Yes     3% Has CHF Yes     1% Has PCP Yes            Chief Complaint: .  Patient Class: Emergency  Preferred Pharmacy:   [id number] [place] - [address] [address]  Phone: [phone] Fax: [phone]    [facility] [address]  Phone: [phone] Fax: [phone]    Primary Care Provider: MABEL Hallman    Primary Insurance: MEDICARE  Secondary Insurance:     ED Discharge Details:    Other Referral/Resources/Interventions Provided:  Interventions: Short Term Rehab  Referral Comments: CM received consult for post-acute placement. Patient just discharged home yesterday and is now agreeable to [facility]. CM sent a blanket referral now and will review a list of options with patient shortly.

## 2025-05-28 ENCOUNTER — TELEPHONE (OUTPATIENT)
Dept: NEPHROLOGY | Facility: CLINIC | Age: 85
End: 2025-05-28

## 2025-05-28 NOTE — TELEPHONE ENCOUNTER
5/28 - called pt  - spoke to family member - pt is in the hospital and then going to rehab - mobile services not needed at this time

## 2025-05-28 NOTE — TELEPHONE ENCOUNTER
I called and spoke to the patient and explained to her that she needs to have BMP labs done 1 week prior to her next appointment. Lab orders mailed out to the patient and the patient understood and was okay with it.

## 2025-05-29 ENCOUNTER — TELEPHONE (OUTPATIENT)
Dept: LAB | Facility: HOSPITAL | Age: 85
End: 2025-05-29

## 2025-05-29 NOTE — TELEPHONE ENCOUNTER
5/29 - spoke to pt daughter yesterday - she told me she was in rehab - not to schedule her at this time

## 2025-06-10 ENCOUNTER — TELEPHONE (OUTPATIENT)
Dept: LAB | Facility: HOSPITAL | Age: 85
End: 2025-06-10

## 2025-06-11 NOTE — ANESTHESIA PREPROCEDURE EVALUATION
Acute Care Surgery   History and Physical     Patient Name: Joel Godfrey  YOB: 2017  Date: 06/11/2025 12:01 AM   Date of Admission: 6/10/2025  HD#0  POD#* No surgery found *    PRESENTING HISTORY   Chief Complaint/Reason for Admission: Acute appendicitis  History source(s): Parent  History of Present Illness:  Patient is a 7-year-old male with no significant PMH who presents with around 12 hours of abdominal pain, nausea, vomiting, and decreased appetite. Accompanied by father. He states the pain began around noon just after the patient woke up. Did not resolve with OTC medications (peptobismol, adam selzter). 6 episodes of emesis since noon, 1 episode since arriving to [facility]. CT scan demonstrating dilated appendix measuring 10mm with wall thickening and periappendiceal inflammatory changes, findings consistent with acute appendicitis. Surgery consulted for evaluation.    Labs on arrival significant for leukocytosis of 18.9, remainder within normal limits.    Denies any fever, chills, chest pain, SOB. Last BM yesterday.    Review of Systems:  12 point ROS negative except as stated in HPI    PAST HISTORY:   Past medical history:  Past Medical History:   Diagnosis Date    Dental caries      Past surgical history:  No past surgical history on file.    Family history:  No family history on file.    Social history:  Social History     Socioeconomic History    Marital status: Single   Tobacco Use    Smoking status: Never    Smokeless tobacco: Never   Substance and Sexual Activity    Alcohol use: Never    Drug use: Never    Sexual activity: Never     Tobacco Use History[1]   Social History     Substance and Sexual Activity   Alcohol Use Never      MEDICATIONS & ALLERGIES:     No current facility-administered medications on file prior to encounter.     No current outpatient medications on file prior to encounter.     Allergies: Review of patient's allergies indicates:  No Known  Review of Systems/Medical History  Patient summary reviewed  Chart reviewed  No history of anesthetic complications     Cardiovascular  EKG reviewed, Hyperlipidemia, Hypertension , Valvular heart disease , aortic valve stenosis and aortic insufficiency, CAD , CHF ,   Comment: LEFT VENTRICLE: Size was normal  Systolic function was normal  Ejection fraction was estimated in the range of 55 % to 65 %  There were no regional wall motion abnormalities  There was mild concentric hypertrophy  DOPPLER: Doppler  parameters were consistent with abnormal left ventricular relaxation (grade 1 diastolic dysfunction)      RIGHT VENTRICLE: The size was normal  Systolic function was normal  Wall thickness was normal      LEFT ATRIUM: The atrium was mildly dilated      RIGHT ATRIUM: Size was normal      MITRAL VALVE: There was mild annular calcification  Valve structure was normal  There was normal leaflet separation  DOPPLER: The transmitral velocity was within the normal range  There was no evidence for stenosis  There was no  regurgitation      AORTIC VALVE: The valve was not visualized well enough to rule out a bicuspid morphology  Leaflets exhibited marked calcification and markedly reduced cuspal separation  DOPPLER: Transaortic velocity was increased due to valvular stenosis  There was moderate to severe stenosis  There was mild regurgitation      TRICUSPID VALVE: The valve structure was normal  There was normal leaflet separation  DOPPLER: The transtricuspid velocity was within the normal range  There was no evidence for stenosis  There was mild regurgitation  Estimated peak PA  pressure was 40 mmHg      PULMONIC VALVE: Leaflets exhibited normal thickness, no calcification, and normal cuspal separation  DOPPLER: The transpulmonic velocity was within the normal range  There was no regurgitation      PERICARDIUM: There was no pericardial effusion   The pericardium was normal in appearance      AORTA: The root exhibited normal "Allergies  Scheduled Meds:   piperacillin-tazobactam (Zosyn) IV (PEDS and ADULTS) (extended infusion is not appropriate)  4.5 g Intravenous Q8H     Continuous Infusions:   dextrose 5 % and 0.45 % NaCl with KCl 20 mEq   Intravenous Continuous         PRN Meds:  Current Facility-Administered Medications:     acetaminophen, 650 mg, Oral, Q4H PRN    ondansetron, 4 mg, Oral, Once PRN    OBJECTIVE:   Vital Signs:  VITAL SIGNS: 24 HR MIN & MAX LAST   Temp  Min: 98.8 °F (37.1 °C)  Max: 99.6 °F (37.6 °C)  98.8 °F (37.1 °C)   BP  Min: 111/83  Max: 135/60  (!) 111/83    Pulse  Min: 92  Max: 161  (!) 161    Resp  Min: 16  Max: 16  16    SpO2  Min: 97 %  Max: 99 %  98 %      HT:    WT: 60 kg (132 lb 4.4 oz)  BMI:       Intake/output:  Intake/Output - Last 3 Shifts       None          No intake or output data in the 24 hours ending 06/11/25 0001      Physical Exam:  General: Well developed, well nourished, no acute distress, overweight for age  HEENT: Normocephalic, PERRL  CV: RR  Resp: NWOB  GI:  Abdomen soft, TTP RLQ/suprapubic region, non-distended but guarding, no rebound or peritonitic signs  :  Deferred  MSK: No muscle atrophy, cyanosis, peripheral edema, moving all extremities spontaneously  Skin/wounds:  No rashes, ulcers, erythema  Neuro:  CNII-XII grossly intact, alert and oriented to person, place, and time    Labs:  Troponin:  No results for input(s): "TROPONINI" in the last 72 hours.  CBC:  Recent Labs     06/10/25  1808   WBC 18.91*   RBC 5.34   HGB 14.9   HCT 44.0*      MCV 82.4   MCH 27.9   MCHC 33.9     CMP:  Recent Labs     06/10/25  1808   *   CALCIUM 9.6      K 3.7   CO2 21      BUN 8.5   CREATININE 0.60     Lactic Acid:  No results for input(s): "LACTATE" in the last 72 hours.  ETOH:  No results for input(s): "ETHANOL" in the last 72 hours.   Urine Drug Screen:  No results for input(s): "COCAINE", "OPIATE", "BARBITURATE", "AMPHETAMINE", "FENTANYL", "CANNABINOIDS", "MDMA" in the " size      SYSTEMIC VEINS: IVC: The inferior vena cava was normal in size and course  Respirophasic changes were normal    , Pulmonary hypertension Pulmonary  Pneumonia, Shortness of breath, Sleep apnea ,        GI/Hepatic    GERD ,             Endo/Other  History of thyroid disease , hypothyroidism,      GYN       Hematology  Anemia ,     Musculoskeletal    Arthritis     Neurology   Psychology           Physical Exam    Airway    Mallampati score: III  TM Distance: >3 FB  Neck ROM: full     Dental   No notable dental hx     Cardiovascular  Cardiovascular exam normal    Pulmonary  Pulmonary exam normal Breath sounds clear to auscultation,     Other Findings        Anesthesia Plan  ASA Score- 4     Anesthesia Type- IV sedation with anesthesia with ASA Monitors  Additional Monitors:   Airway Plan:         Plan Factors- Patient instructed to abstain from smoking on day of procedure       Induction- intravenous  Postoperative Plan-     Informed Consent- Anesthetic plan and risks discussed with patient  I personally reviewed this patient with the CRNA  Discussed and agreed on the Anesthesia Plan with the CRNA             Lab Results   Component Value Date    WBC 8 75 08/15/2018    HGB 9 8 (L) 08/15/2018    HCT 33 2 (L) 08/15/2018    MCV 79 (L) 08/15/2018     (H) 08/15/2018     Lab Results   Component Value Date    CALCIUM 10 4 (H) 08/29/2018     08/29/2018    K 3 6 08/29/2018    CO2 34 (H) 08/29/2018     08/29/2018    BUN 24 08/29/2018    CREATININE 0 90 08/29/2018     Lab Results   Component Value Date    INR 0 98 12/02/2017    PROTIME 13 2 12/02/2017     Lab Results   Component Value Date    PTT 31 12/02/2017           I, Dr Jolynn Delgado, the attending physician, have personally seen and evaluated the patient prior to anesthetic care  I have reviewed the pre-anesthetic record, and other medical records if appropriate to the anesthetic care   If a CRNA is involved in the case, I have reviewed "last 72 hours.    Invalid input(s): "BENZODIAZEPINE", "PHENCYCLIDINE"   ABG:  No results for input(s): "PH", "PO2", "PCO2", "HCO3", "BE" in the last 168 hours.   I have reviewed all pertinent lab results within the past 24 hours.    Diagnostic Results:  Imaging Results              CT Abdomen Pelvis With IV Contrast NO Oral Contrast (Preliminary result)  Result time 06/10/25 21:44:29      Preliminary result by Devonte Cline Jr., MD (06/10/25 21:44:29)                   Narrative:    START OF REPORT:  Technique: CT of the abdomen and pelvis was performed with axial images as well as sagittal and coronal reconstruction images with intravenous contrast.    Comparison: Comparison is with sonogram study dated 2025-06-10 18:57:54.    Clinical History: Rlq pain.    Dosage Information: Automated Exposure Control was utilized.    Findings:  Lines and Tubes: None.  Thorax:  Lungs: No focal infiltrate or consolidation is seen. A 1.5 cm pulmonary cyst is seen in the posterior left upper lobe.  Pleura: No effusions or thickening.  Heart: The heart size is within normal limits.  Abdomen:  Abdominal Wall: No abdominal wall pathology is seen.  Liver: The liver appears unremarkable.  Biliary System: No intrahepatic or extrahepatic biliary duct dilatation is seen.  Gallbladder: The gallbladder appears unremarkable.  Pancreas: The pancreas appears unremarkable.  Adrenals: The adrenal glands appear unremarkable.  Kidneys: The kidneys appear unremarkable with no stones cysts masses or hydronephrosis.  Aorta: The abdominal aorta appears unremarkable.  IVC: Unremarkable.  Bowel:  Esophagus: The visualized esophagus appears unremarkable.  Stomach: The stomach appears unremarkable.  Duodenum: Unremarkable appearing duodenum.  Colon: Nondistended.  Appendix: The appendix is distended measuring 10.2 mm on Image 94, Series 2 and demonstrates wall thickening periappendiceal inflammatory changes trace periappendiceal fluid. There are " the CRNA assessment, if present, and agree  The patient is in a suitable condition to proceed with my formulated anesthetic plan  prominent sized lymph nodes in the right lower quadrant region. The mid to distal ileum is fluid filled with some mucosal enhancement.  Peritoneum: No free intraperitoneal air is seen. Trace free fluid is seen in the pelvis.    Pelvis:  Bladder: The bladder appears unremarkable.  Male:  Prostate gland: The prostate gland appears unremarkable.    Bony structures:  Dorsal Spine: The visualized dorsal spine appears unremarkable.  Bony Pelvis: The visualized bony structures of the pelvis appear unremarkable.    Notifications: The results were discussed with the emergency room physician Dr Hair prior to dictation at 2025-06-10 21:42:12 CDT.      Impression:  1. The appendix is distended measuring 10.2 mm on Image 94, Series 2 and demonstrates wall thickening periappendiceal inflammatory changes trace periappendiceal fluid. There are prominent sized lymph nodes in the right lower quadrant region. The mid to distal ileum is fluid filled with some mucosal enhancement. This is consistent with appendicitis with reactive changes.  2. Details and findings as discussed.                                         US Abdomen Limited (Preliminary result)  Result time 06/10/25 21:00:39      Preliminary result by Devonte Cline Jr., MD (06/10/25 21:00:39)                   Narrative:    START OF REPORT:  Technique: Right lower quadrant ultrasound.    Comparison: None.    Clinical history: Concern for appendicitis.    Findings:  Right lower quadrant: Minimal free fluid is seen in the right lower quadrant region. Bowel peristalsis is appreciated in the right lower quadrant. The appendix is not visualized such that the possibility of appendicitis cannot be excluded on the basis of this study.      Impression:  1. Minimal free fluid is seen in the right lower quadrant region. Bowel peristalsis is appreciated in the right lower quadrant. The appendix is not visualized such that the possibility of appendicitis cannot be excluded  on the basis of this study. Correlate with clinical and laboratory findings as regards further evaluation and followup.  2. Details as above.                                       I have reviewed all pertinent imaging results/findings within the past 24 hours.    ASSESSMENT & PLAN:    7yM with no significant PMH presenting with acute appendicitis.     - Admit to general surgery  - OR 6/11 for laparoscopic appendectomy. After discussing risks, benefits, and alternatives to surgery with the patient's father, he elects to proceed. All questions and concerns addressed. Consent obtained.   - NPO with [facility]  - Zosyn  - Tha Garnett DO  [employer] General Surgery PGY-2       [1]   Social History  Tobacco Use   Smoking Status Never   Smokeless Tobacco Never

## 2025-06-12 ENCOUNTER — LAB (OUTPATIENT)
Dept: LAB | Facility: HOSPITAL | Age: 85
End: 2025-06-12
Payer: MEDICARE

## 2025-06-12 ENCOUNTER — TELEPHONE (OUTPATIENT)
Dept: CARDIOLOGY CLINIC | Facility: CLINIC | Age: 85
End: 2025-06-12

## 2025-06-12 ENCOUNTER — ANTICOAG VISIT (OUTPATIENT)
Dept: CARDIOLOGY CLINIC | Facility: CLINIC | Age: 85
End: 2025-06-12

## 2025-06-12 ENCOUNTER — RESULTS FOLLOW-UP (OUTPATIENT)
Dept: CARDIOLOGY CLINIC | Facility: CLINIC | Age: 85
End: 2025-06-12

## 2025-06-12 DIAGNOSIS — I48.0 PAROXYSMAL ATRIAL FIBRILLATION (HCC): ICD-10-CM

## 2025-06-12 DIAGNOSIS — N17.9 ACUTE RENAL FAILURE, UNSPECIFIED ACUTE RENAL FAILURE TYPE (HCC): ICD-10-CM

## 2025-06-12 LAB
ANION GAP SERPL CALCULATED.3IONS-SCNC: 15 MMOL/L (ref 4–13)
BUN SERPL-MCNC: 39 MG/DL (ref 5–25)
CALCIUM SERPL-MCNC: 10 MG/DL (ref 8.4–10.2)
CHLORIDE SERPL-SCNC: 99 MMOL/L (ref 96–108)
CO2 SERPL-SCNC: 26 MMOL/L (ref 21–32)
CREAT SERPL-MCNC: 1.3 MG/DL (ref 0.6–1.3)
GFR SERPL CREATININE-BSD FRML MDRD: 37 ML/MIN/1.73SQ M
GLUCOSE SERPL-MCNC: 59 MG/DL (ref 65–140)
INR PPP: 2.12 (ref 0.85–1.19)
POTASSIUM SERPL-SCNC: 3.9 MMOL/L (ref 3.5–5.3)
PROTHROMBIN TIME: 24.5 SECONDS (ref 12.3–15)
SODIUM SERPL-SCNC: 140 MMOL/L (ref 135–147)

## 2025-06-12 PROCEDURE — 85610 PROTHROMBIN TIME: CPT

## 2025-06-12 PROCEDURE — 80048 BASIC METABOLIC PNL TOTAL CA: CPT

## 2025-06-12 NOTE — TELEPHONE ENCOUNTER
Hi,  Requesting a pt/inr home draw to be done around 6/26. Please call the pt to set up. Script is in the chart.     Thank you.

## 2025-06-16 ENCOUNTER — TELEPHONE (OUTPATIENT)
Age: 85
End: 2025-06-16

## 2025-06-16 DIAGNOSIS — I47.10 SVT (SUPRAVENTRICULAR TACHYCARDIA) (HCC): ICD-10-CM

## 2025-06-16 NOTE — TELEPHONE ENCOUNTER
Pt contacted Call Center requested refill of their medication.        Doctor Name: prescribed by Estuardo Oliveira MD in hospital, requesting refill from Dr. Esteves      Medication Name: atenolol (TENORMIN)       Dosage of Med: 25 mg tablet        Frequency of Med:  Take 1 tablet (25 mg total) by mouth daily       Remaining Medication: 0      Pharmacy and Location: [facility]        Pt. Preferred Callback Phone Number: [phone]      Thank you.

## 2025-06-16 NOTE — TELEPHONE ENCOUNTER
Adrienne - physical therapy with residential home [facility] called in to inform provider that patient has delayed physical therapy elevation Wednesday Thursday Friday for next week. Dues to other appointment .

## 2025-06-17 ENCOUNTER — OFFICE VISIT (OUTPATIENT)
Dept: NEPHROLOGY | Facility: CLINIC | Age: 85
End: 2025-06-17
Payer: MEDICARE

## 2025-06-17 VITALS
HEIGHT: 55 IN | SYSTOLIC BLOOD PRESSURE: 151 MMHG | WEIGHT: 170 LBS | BODY MASS INDEX: 39.34 KG/M2 | OXYGEN SATURATION: 91 % | DIASTOLIC BLOOD PRESSURE: 77 MMHG | TEMPERATURE: 98.2 F | HEART RATE: 59 BPM

## 2025-06-17 DIAGNOSIS — I50.32 CHRONIC HEART FAILURE WITH PRESERVED EJECTION FRACTION (HFPEF) (HCC): ICD-10-CM

## 2025-06-17 DIAGNOSIS — I10 PRIMARY HYPERTENSION: ICD-10-CM

## 2025-06-17 DIAGNOSIS — D64.9 ANEMIA, UNSPECIFIED TYPE: ICD-10-CM

## 2025-06-17 DIAGNOSIS — N17.9 AKI (ACUTE KIDNEY INJURY) (HCC): Primary | ICD-10-CM

## 2025-06-17 PROCEDURE — 99214 OFFICE O/P EST MOD 30 MIN: CPT

## 2025-06-17 NOTE — ASSESSMENT & PLAN NOTE
Orders:    Urinalysis with microscopic; Future    Basic metabolic panel; Future    Albumin / creatinine urine ratio; Future    After review the medical record, it appears that previously known creatinine levels are fluctuating at around 0.7-0.9.  She was hospitalized with acute cystitis with peak creatinine of 2.09.    Ultrasound of the kidney and bladder on 5/22/2025 noted no evidence of hydronephrosis or urinary retention.    Etiology multifactorial suspected secondary to underlying ATN in the setting of sepsis.  She had also undergone CT scanning with IV contrast on 5/20/2025 and underlying contrast-induced nephropathy on top of ATN could not be ruled out.    She received IV fluids with improvement in creatinine level to 1.11 prior to discharge.  Most recent creatinine level was 1.30 with an EGFR of 37 and slightly above prior suspected baseline.  Suspect creatinine levels may be fluctuating based on hydration and use of diuretic, concern for development of underlying chronic kidney disease in the setting of age-related nephron loss, hypertensive nephrosclerosis, and prior ERIN episode.    As she clinically appears euvolemic advised her to increase her water intake up to 60 ounces daily.  Avoid nephrotoxic agent such as NSAIDs.  Will check BMP and urine studies prior to follow-up.

## 2025-06-17 NOTE — ASSESSMENT & PLAN NOTE
Wt Readings from Last 3 Encounters:   06/17/25 77.1 kg (170 lb)   05/24/25 77.1 kg (170 lb)   05/12/25 77.1 kg (170 lb)     Orders:    Basic metabolic panel; Future    Most recent echocardiogram on 7/30/2024 revealed an ejection fraction of 65% with normal systolic and diastolic function.  She is actively taking furosemide 40 mg daily.  Clinically appears euvolemic.

## 2025-06-17 NOTE — PATIENT INSTRUCTIONS
Drink plenty of water, up to 55-60 ounces.   Avoid NSAIDs (ibuprofen, Motrin, Aleve, naproxen, Advil)  We will see you back in 4 months and recheck blood and urine sample prior to that visit.  Continue to monitor your blood pressure at home, goal average 130/80 or less. IF consistently high, may need to adjust blood pressure medications.   Acetaminophen maximum dosing is 3000 mg (3 grams) daily.

## 2025-06-17 NOTE — LETTER
June 17, 2025     MABEL Hallman  [address] [place] [id number]    Patient: Marisol Otoole   YOB: 1940   Date of Visit: 6/17/2025       Dear MABEL Castillo:    Thank you for referring Marisol Otoole to me for evaluation. Below are my notes for this consultation.    If you have questions, please do not hesitate to call me. I look forward to following your patient along with you.         Sincerely,        MABEL Soriano        CC: No Recipients    MABEL Soriano  6/17/2025  1:30 PM  Sign when Signing Visit  NEPHROLOGY OFFICE NOTE    Patient: Marisol Otoole               Sex: female           YOB: 1940        Age:  84 y.o.       6/17/2025    ASSESSMENT/PLAN     Assessment & Plan  ERIN (acute kidney injury) (HCC)    Orders:  •  Urinalysis with microscopic; Future  •  Basic metabolic panel; Future  •  Albumin / creatinine urine ratio; Future    After review the medical record, it appears that previously known creatinine levels are fluctuating at around 0.7-0.9.  She was hospitalized with acute cystitis with peak creatinine of 2.09.    Ultrasound of the kidney and bladder on 5/22/2025 noted no evidence of hydronephrosis or urinary retention.    Etiology multifactorial suspected secondary to underlying ATN in the setting of sepsis.  She had also undergone CT scanning with IV contrast on 5/20/2025 and underlying contrast-induced nephropathy on top of ATN could not be ruled out.    She received IV fluids with improvement in creatinine level to 1.11 prior to discharge.  Most recent creatinine level was 1.30 with an EGFR of 37 and slightly above prior suspected baseline.  Suspect creatinine levels may be fluctuating based on hydration and use of diuretic, concern for development of underlying chronic kidney disease in the setting of age-related nephron loss, hypertensive nephrosclerosis, and prior ERIN episode.    As she clinically appears euvolemic  advised her to increase her water intake up to 60 ounces daily.  Avoid nephrotoxic agent such as NSAIDs.  Will check BMP and urine studies prior to follow-up.  Primary hypertension    Orders:  •  Urinalysis with microscopic; Future  •  Basic metabolic panel; Future  •  Albumin / creatinine urine ratio; Future    Currently maintained on atenolol 25 mg daily.  Pressure borderline, she reports that she has a health aide coming into the house and states blood pressure has been under good control.  Will monitor current regimen at this time.  Advised her to contact her office if consistently elevated above 130/80.  Chronic heart failure with preserved ejection fraction (HFpEF) ([facility])  Wt Readings from Last 3 Encounters:   06/17/25 77.1 kg (170 lb)   05/24/25 77.1 kg (170 lb)   05/12/25 77.1 kg (170 lb)     Orders:  •  Basic metabolic panel; Future    Most recent echocardiogram on 7/30/2024 revealed an ejection fraction of 65% with normal systolic and diastolic function.  She is actively taking furosemide 40 mg daily.  Clinically appears euvolemic.  Anemia, unspecified type    Orders:  •  CBC and differential; Future  •  Iron Panel (Includes Ferritin, Iron Sat%, Iron, and TIBC); Future    Hemoglobin during hospitalization was slightly low at 8.6.  She denies active bleeding, is having black in stools due to iron supplementation.  Will check updated CBC as well as iron panel prior to follow-up.    BACKGROUND     I had the pleasure of seeing Marisol Otoole in the nephrology office today for hospital follow-up.  She has a past medical history significant for sick sinus syndrome status post pacemaker placement, congestive heart failure, atrial fibrillation, hypertension, and sleep apnea.    After review of the medical record, it appears her baseline creatinine levels are fluctuating at around 0.6-0.7.  She was hospitalized at [facility] from 5/20/2025 until 5/26/2025 after presenting with acute cystitis and was  treated with ceftriaxone.  Hospitalization was complicated by acute kidney injury with peak creatinine of 2.09 during admission.  Etiology was multifactorial in the setting of sepsis and concern for possible contrast-induced nephropathy.    She received IV fluids with improvement in creatinine level to 1.11 prior to discharge.    She reports that she has been doing overall well since she has been discharged from the hospital.  She does have a health aide coming into the house to help her.  She is on chronic oxygen therapy for her breathing since hospital discharge, reports her breathing does feel at baseline.    The last blood work was done on 6/12/2025, which we have reviewed together.    SUBJECTIVE     She currently has no major complaints at this time apart from above.    REVIEW OF SYSTEMS     Review of Systems   Constitutional:  Positive for activity change. Negative for fever.   HENT:  Negative for trouble swallowing.    Respiratory:  Negative for shortness of breath.    Cardiovascular:  Positive for leg swelling.   Gastrointestinal:  Negative for nausea and vomiting.        Black stool - on oral iron supplement   Genitourinary:  Negative for difficulty urinating, dysuria, frequency and hematuria.   Musculoskeletal:  Negative for back pain.   Skin:  Negative for pallor.   Neurological:  Positive for weakness. Negative for dizziness, syncope and light-headedness.   Psychiatric/Behavioral:  Negative for sleep disturbance. The patient is not nervous/anxious.        OBJECTIVE     Current Weight: Weight - Scale: 77.1 kg (170 lb)  Vitals:    06/17/25 1303   BP: 151/77   Pulse: 59   Temp: 98.2 °F (36.8 °C)   SpO2: 91%     Body mass index is 40.99 kg/m².    CURRENT MEDICATIONS     Current Medications[1]      PAST MEDICAL HISTORY     Past Medical History[2]    PAST SURGICAL HISTORY     Past Surgical History[3]    ALLERGIES     Allergies[4]    SOCIAL HISTORY     Social History     Substance and Sexual Activity   Alcohol  Use Not Currently     Social History     Substance and Sexual Activity   Drug Use Never     Tobacco Use History[5]    FAMILY HISTORY     Family History[6]    PHYSICAL EXAMINATION     Physical Exam  Vitals reviewed.   Constitutional:       General: She is not in acute distress.  HENT:      Head: Normocephalic.      Mouth/Throat:      Lips: Pink.      Mouth: Mucous membranes are moist.     Eyes:      General: Lids are normal. No scleral icterus.      Cardiovascular:      Rate and Rhythm: Normal rate and regular rhythm.      Heart sounds: S1 normal and S2 normal.   Pulmonary:      Effort: Pulmonary effort is normal. No accessory muscle usage or respiratory distress.      Breath sounds: Decreased air movement present.      Comments: On nasal cannula oxygen  Abdominal:      General: There is no distension.      Tenderness: There is no abdominal tenderness.     Musculoskeletal:      Cervical back: Normal range of motion and neck supple. No tenderness.      Right lower leg: Edema (trace) present.      Left lower leg: Edema (trace) present.     Skin:     General: Skin is warm.      Coloration: Skin is not cyanotic or jaundiced.     Neurological:      General: No focal deficit present.      Mental Status: She is alert and oriented to person, place, and time.     Psychiatric:         Attention and Perception: Attention normal.         Speech: Speech normal.         Behavior: Behavior is cooperative.           LAB RESULTS     Results from last 7 days   Lab Units 06/12/25  0844   SODIUM mmol/L 140   POTASSIUM mmol/L 3.9   CHLORIDE mmol/L 99   CO2 mmol/L 26   BUN mg/dL 39*   CREATININE mg/dL 1.30   EGFR ml/min/1.73sq m 37   CALCIUM mg/dL 10.0         RADIOLOGY RESULTS      Results for orders placed during the hospital encounter of 05/20/25    XR chest portable    Narrative  XR CHEST PORTABLE    INDICATION: sob.    COMPARISON: 5/20/2025    FINDINGS: Left chest wall intracardiac device with intact lead(s). No abandoned  lead(s).    Clear lungs. No pneumothorax or pleural effusion.    Enlarged cardiac silhouette, unchanged. Status post AVR.    There are severe degenerative changes of the bilateral shoulders.    Normal upper abdomen.    Impression  No acute cardiopulmonary disease.        Workstation performed: [id number]    Results for orders placed during the hospital encounter of 03/25/25    XR chest pa and lateral    Narrative  CHEST    INDICATION:   Pneumonia, unspecified organism.    COMPARISON:  None.    EXAM PERFORMED/VIEWS:  XR CHEST PA AND LATERAL    FINDINGS:    Cardiomediastinal silhouette appears enlarged. Dual-chamber pacemaker in place with a small loop in the ventricular lead post TAVR.    The lungs are clear.  No pneumothorax or pleural effusion.    Osseous structures appear within normal limits for patient age.    Impression  No acute cardiopulmonary disease.  Cardiomegaly. Post TAVR  Dual-chamber pacemaker adequately positioned. Improvement in consolidation in the left lower lobe noted prior study 2/25/2025 and improved pulmonary expansion from 8/13/2024          Electronically signed: 03/25/2025 05:42 PM Cedric Charles MD    Ultrasound kidney and bladder 5/22/2025:  FINDINGS:     KIDNEYS:  Symmetric and normal size.  Right kidney: 11.3 x 4.7 x 4.2 cm. Volume 115.7 mL  Left kidney: 12.7 x 4.9 x 5.3 cm. Volume 171.6 mL     Right kidney  Normal echogenicity and contour.  Multiple cysts reidentified for example in the mid pole measuring 1.5 cm.  No hydronephrosis.  No shadowing calculi.  No perinephric fluid collections.     Left kidney  Normal echogenicity and contour.  Simple lower pole cyst reidentified measuring 1.2 cm.  No hydronephrosis.  No shadowing calculi.  No perinephric fluid collections.     URETERS:  Nonvisualized.     BLADDER:  Possibly empty and not visualized.        IMPRESSION:     Bilateral simple renal cysts. No hydronephrosis.     Bladder not visualized.    Recommend Nephrology follow-up in 4  "months.    MABEL Soriano  Nephrology  6/17/2025      Portions of the record may have been created with voice recognition software. Occasional wrong word or \"sound a like\" substitutions may have occurred due to the inherent limitations of voice recognition software. Read the chart carefully and recognize, using context, where substitutions have occurred.          [1]   Current Outpatient Medications:   •  acetaminophen (TYLENOL) 500 mg tablet, Take 500 mg by mouth every 6 (six) hours as needed, Disp: , Rfl:   •  aspirin (ECOTRIN LOW STRENGTH) 81 mg EC tablet, Take 1 tablet (81 mg total) by mouth daily, Disp: 100 tablet, Rfl: 0  •  atenolol (TENORMIN) 25 mg tablet, Take 1 tablet (25 mg total) by mouth daily, Disp: 30 tablet, Rfl: 0  •  b complex vitamins capsule, Take 1 capsule by mouth in the morning and 1 capsule in the evening., Disp: , Rfl:   •  Blood Glucose Monitoring Suppl (OneTouch Verio Reflect) w/Device KIT, Check blood sugars once daily. Please substitute with appropriate alternative as covered by patient's insurance. Dx: E11.65, Disp: 1 kit, Rfl: 0  •  Calcium Carb-Cholecalciferol (CALCIUM 600 + D PO), Take 1 tablet by mouth in the morning and 1 tablet in the evening., Disp: , Rfl:   •  Cranberry 250 MG TABS, Take by mouth, Disp: , Rfl:   •  fluticasone (FLONASE) 50 mcg/act nasal spray, 1 spray into each nostril daily, Disp: 16 g, Rfl: 1  •  furosemide (LASIX) 40 mg tablet, TAKE 1 TABLET BY MOUTH EVERY DAY *NEW PRESCRIPTION REQUEST*, Disp: 90 tablet, Rfl: 1  •  glucose blood (OneTouch Verio) test strip, Check blood sugars once daily. Please substitute with appropriate alternative as covered by patient's insurance. Dx: E11.65, Disp: 100 each, Rfl: 3  •  Lancets (onetouch ultrasoft) lancets, Use in the morning Use as instructed, Disp: 100 each, Rfl: 1  •  levothyroxine 50 mcg tablet, TAKE 1 TABLET BY MOUTH EVERY DAY *NEW PRESCRIPTION REQUEST*, Disp: 30 tablet, Rfl: 10  •  nystatin (MYCOSTATIN) " powder, Apply topically 2 (two) times a day, Disp: 60 g, Rfl: 1  •  omeprazole (PriLOSEC) 40 MG capsule, TAKE 1 CAPSULE BY MOUTH TWICE A DAY *NEW PRESCRIPTION REQUEST*, Disp: 60 capsule, Rfl: 10  •  OneTouch Delica Lancets 33G MISC, Check blood sugars once daily. Please substitute with appropriate alternative as covered by patient's insurance. Dx: E11.65, Disp: 100 each, Rfl: 3  •  oxybutynin (DITROPAN-XL) 5 mg 24 hr tablet, TAKE 1 TABLET (5 MG TOTAL) BY MOUTH DAILY. *NEW PRESCRIPTION REQUEST*, Disp: 30 tablet, Rfl: 10  •  sertraline (ZOLOFT) 100 mg tablet, TAKE 1 TABLET BY MOUTH EVERY DAY *NEW PRESCRIPTION REQUEST*, Disp: 30 tablet, Rfl: 10  •  simvastatin (ZOCOR) 40 mg tablet, TAKE 1 TABLET BY MOUTH EVERYDAY AT BEDTIME *NEW PRESCRIPTION REQUEST*, Disp: 30 tablet, Rfl: 10  •  warfarin (COUMADIN) 2.5 mg tablet, Take 1 tablet (2.5mg) Mon-Sat. Take 2 tablets (5mg) on Sun or as directed, Disp: 102 tablet, Rfl: 0  [2]   Past Medical History:  Diagnosis Date   • Anemia 08/22/2018   • Anxiety    • Arthritis    • AVB (atrioventricular block)     first degree   • Cataract    • CHF (congestive heart failure) ([facility])    • COPD (chronic obstructive pulmonary disease) ([facility])    • COPD, mild ([facility])    • Coronary artery disease    • Dislocation of right shoulder joint    • Frequent UTI    • GERD (gastroesophageal reflux disease)    • H/O: pneumonia    • Heme positive stool    • Hyperlipidemia    • Hypertension    • Hypothyroidism    • Morbid obesity with BMI of 50.0-59.9, adult ([facility])    • Obesity, morbid ([facility]) 08/22/2018   • JANICE on CPAP    • Pulmonary hypertension ([facility]) 08/22/2018   • Severe aortic stenosis    • Simple goiter    • Skin cyst     within the armpits, right   • Wears glasses    [3]   Past Surgical History:  Procedure Laterality Date   • BREAST BIOPSY     • CARDIAC CATHETERIZATION     • CARDIAC ELECTROPHYSIOLOGY PROCEDURE N/A 8/12/2024    Procedure: Cardiac pacer implant;  Surgeon: Clarke Zuluaga MD;  Location: [facility];  Service: Cardiology   • CARPAL TUNNEL RELEASE Bilateral    • CHOLECYSTECTOMY     • DILATION AND CURETTAGE OF UTERUS     • HYSTEROSCOPY     • MASTOID SURGERY     • UT COLONOSCOPY FLX DX W/COLLJ SPEC WHEN PFRMD N/A 9/6/2018    Procedure: COLONOSCOPY;  Surgeon: Shree Sosa III, MD;  Location: [facility];  Service: Gastroenterology   • UT ECHO TRANSESOPHAG R-T 2D W/PRB IMG ACQUISJ I&R N/A 10/9/2018    Procedure: INTRA-OP TRANSESOPHAGEAL ECHOCARDIOGRAM (GARRISON);  Surgeon: Kushal Camarena DO;  Location:  MAIN OR;  Service: Cardiac Surgery   • UT ESOPHAGOGASTRODUODENOSCOPY TRANSORAL DIAGNOSTIC N/A 8/31/2018    Procedure: ESOPHAGOGASTRODUODENOSCOPY (EGD);  Surgeon: Shree Ssoa III, MD;  Location: [facility];  Service: Gastroenterology   • UT REPLACE AORTIC VALVE OPENFEMORAL ARTERY APPROACH N/A 10/9/2018    Procedure: REPLACEMENT AORTIC VALVE TRANSCATHETER (TAVR) TRANSFEMORAL W/ 23 MM MENDOZA NOE S3 VALVE (ACCESS OF LEFT);  Surgeon: Kushal Camarena DO;  Location: [facility];  Service: Cardiac Surgery   • TOTAL HIP ARTHROPLASTY Left 2007   • TOTAL KNEE ARTHROPLASTY Bilateral    [4]   Allergies  Allergen Reactions   • Latex Rash   • Neosporin [Neomycin-Bacitracin Zn-Polymyx] Rash and Other (See Comments)     hives per [facility] order   [5]   Social History  Tobacco Use   Smoking Status Never   • Passive exposure: Never   Smokeless Tobacco Never   [6]   Family History  Problem Relation Name Age of Onset   • Diabetes Mother     • Stroke Mother     • Cancer Father     • Lung cancer Father     • Diabetes Sister     • Heart disease Sister     • Hypertension Sister     • Coronary artery disease Family     • Diabetes Family     • Hypertension Family     • Cancer Family     • Stroke Family     • Thyroid disease Neg Hx

## 2025-06-17 NOTE — ASSESSMENT & PLAN NOTE
Orders:    Urinalysis with microscopic; Future    Basic metabolic panel; Future    Albumin / creatinine urine ratio; Future    Currently maintained on atenolol 25 mg daily.  Pressure borderline, she reports that she has a health aide coming into the house and states blood pressure has been under good control.  Will monitor current regimen at this time.  Advised her to contact her office if consistently elevated above 130/80.

## 2025-06-17 NOTE — PROGRESS NOTES
NEPHROLOGY OFFICE NOTE    Patient: Marisol Otoole               Sex: female           YOB: 1940        Age:  84 y.o.       6/17/2025    ASSESSMENT/PLAN     Assessment & Plan  ERIN (acute kidney injury) (HCC)    Orders:    Urinalysis with microscopic; Future    Basic metabolic panel; Future    Albumin / creatinine urine ratio; Future    After review the medical record, it appears that previously known creatinine levels are fluctuating at around 0.7-0.9.  She was hospitalized with acute cystitis with peak creatinine of 2.09.    Ultrasound of the kidney and bladder on 5/22/2025 noted no evidence of hydronephrosis or urinary retention.    Etiology multifactorial suspected secondary to underlying ATN in the setting of sepsis.  She had also undergone CT scanning with IV contrast on 5/20/2025 and underlying contrast-induced nephropathy on top of ATN could not be ruled out.    She received IV fluids with improvement in creatinine level to 1.11 prior to discharge.  Most recent creatinine level was 1.30 with an EGFR of 37 and slightly above prior suspected baseline.  Suspect creatinine levels may be fluctuating based on hydration and use of diuretic, concern for development of underlying chronic kidney disease in the setting of age-related nephron loss, hypertensive nephrosclerosis, and prior ERIN episode.    As she clinically appears euvolemic advised her to increase her water intake up to 60 ounces daily.  Avoid nephrotoxic agent such as NSAIDs.  Will check BMP and urine studies prior to follow-up.  Primary hypertension    Orders:    Urinalysis with microscopic; Future    Basic metabolic panel; Future    Albumin / creatinine urine ratio; Future    Currently maintained on atenolol 25 mg daily.  Pressure borderline, she reports that she has a health aide coming into the house and states blood pressure has been under good control.  Will monitor current regimen at this time.  Advised her to contact her office if  consistently elevated above 130/80.  Chronic heart failure with preserved ejection fraction (HFpEF) ([facility])  Wt Readings from Last 3 Encounters:   06/17/25 77.1 kg (170 lb)   05/24/25 77.1 kg (170 lb)   05/12/25 77.1 kg (170 lb)     Orders:    Basic metabolic panel; Future    Most recent echocardiogram on 7/30/2024 revealed an ejection fraction of 65% with normal systolic and diastolic function.  She is actively taking furosemide 40 mg daily.  Clinically appears euvolemic.  Anemia, unspecified type    Orders:    CBC and differential; Future    Iron Panel (Includes Ferritin, Iron Sat%, Iron, and TIBC); Future    Hemoglobin during hospitalization was slightly low at 8.6.  She denies active bleeding, is having black in stools due to iron supplementation.  Will check updated CBC as well as iron panel prior to follow-up.    BACKGROUND     I had the pleasure of seeing Marisol Otoole in the nephrology office today for hospital follow-up.  She has a past medical history significant for sick sinus syndrome status post pacemaker placement, congestive heart failure, atrial fibrillation, hypertension, and sleep apnea.    After review of the medical record, it appears her baseline creatinine levels are fluctuating at around 0.6-0.7.  She was hospitalized at [facility] from 5/20/2025 until 5/26/2025 after presenting with acute cystitis and was treated with ceftriaxone.  Hospitalization was complicated by acute kidney injury with peak creatinine of 2.09 during admission.  Etiology was multifactorial in the setting of sepsis and concern for possible contrast-induced nephropathy.    She received IV fluids with improvement in creatinine level to 1.11 prior to discharge.    She reports that she has been doing overall well since she has been discharged from the hospital.  She does have a health aide coming into the house to help her.  She is on chronic oxygen therapy for her breathing since hospital discharge, reports her  breathing does feel at baseline.    The last blood work was done on 6/12/2025, which we have reviewed together.    SUBJECTIVE     She currently has no major complaints at this time apart from above.    REVIEW OF SYSTEMS     Review of Systems   Constitutional:  Positive for activity change. Negative for fever.   HENT:  Negative for trouble swallowing.    Respiratory:  Negative for shortness of breath.    Cardiovascular:  Positive for leg swelling.   Gastrointestinal:  Negative for nausea and vomiting.        Black stool - on oral iron supplement   Genitourinary:  Negative for difficulty urinating, dysuria, frequency and hematuria.   Musculoskeletal:  Negative for back pain.   Skin:  Negative for pallor.   Neurological:  Positive for weakness. Negative for dizziness, syncope and light-headedness.   Psychiatric/Behavioral:  Negative for sleep disturbance. The patient is not nervous/anxious.        OBJECTIVE     Current Weight: Weight - Scale: 77.1 kg (170 lb)  Vitals:    06/17/25 1303   BP: 151/77   Pulse: 59   Temp: 98.2 °F (36.8 °C)   SpO2: 91%     Body mass index is 40.99 kg/m².    CURRENT MEDICATIONS     Current Medications[1]      PAST MEDICAL HISTORY     Past Medical History[2]    PAST SURGICAL HISTORY     Past Surgical History[3]    ALLERGIES     Allergies[4]    SOCIAL HISTORY     Social History     Substance and Sexual Activity   Alcohol Use Not Currently     Social History     Substance and Sexual Activity   Drug Use Never     Tobacco Use History[5]    FAMILY HISTORY     Family History[6]    PHYSICAL EXAMINATION     Physical Exam  Vitals reviewed.   Constitutional:       General: She is not in acute distress.  HENT:      Head: Normocephalic.      Mouth/Throat:      Lips: Pink.      Mouth: Mucous membranes are moist.     Eyes:      General: Lids are normal. No scleral icterus.      Cardiovascular:      Rate and Rhythm: Normal rate and regular rhythm.      Heart sounds: S1 normal and S2 normal.   Pulmonary:       Effort: Pulmonary effort is normal. No accessory muscle usage or respiratory distress.      Breath sounds: Decreased air movement present.      Comments: On nasal cannula oxygen  Abdominal:      General: There is no distension.      Tenderness: There is no abdominal tenderness.     Musculoskeletal:      Cervical back: Normal range of motion and neck supple. No tenderness.      Right lower leg: Edema (trace) present.      Left lower leg: Edema (trace) present.     Skin:     General: Skin is warm.      Coloration: Skin is not cyanotic or jaundiced.     Neurological:      General: No focal deficit present.      Mental Status: She is alert and oriented to person, place, and time.     Psychiatric:         Attention and Perception: Attention normal.         Speech: Speech normal.         Behavior: Behavior is cooperative.           LAB RESULTS     Results from last 7 days   Lab Units 06/12/25  0844   SODIUM mmol/L 140   POTASSIUM mmol/L 3.9   CHLORIDE mmol/L 99   CO2 mmol/L 26   BUN mg/dL 39*   CREATININE mg/dL 1.30   EGFR ml/min/1.73sq m 37   CALCIUM mg/dL 10.0         RADIOLOGY RESULTS      Results for orders placed during the hospital encounter of 05/20/25    XR chest portable    Narrative  XR CHEST PORTABLE    INDICATION: sob.    COMPARISON: 5/20/2025    FINDINGS: Left chest wall intracardiac device with intact lead(s). No abandoned lead(s).    Clear lungs. No pneumothorax or pleural effusion.    Enlarged cardiac silhouette, unchanged. Status post AVR.    There are severe degenerative changes of the bilateral shoulders.    Normal upper abdomen.    Impression  No acute cardiopulmonary disease.        Workstation performed: [id number]    Results for orders placed during the hospital encounter of 03/25/25    XR chest pa and lateral    Narrative  CHEST    INDICATION:   Pneumonia, unspecified organism.    COMPARISON:  None.    EXAM PERFORMED/VIEWS:  XR CHEST PA AND LATERAL    FINDINGS:    Cardiomediastinal silhouette  "appears enlarged. Dual-chamber pacemaker in place with a small loop in the ventricular lead post TAVR.    The lungs are clear.  No pneumothorax or pleural effusion.    Osseous structures appear within normal limits for patient age.    Impression  No acute cardiopulmonary disease.  Cardiomegaly. Post TAVR  Dual-chamber pacemaker adequately positioned. Improvement in consolidation in the left lower lobe noted prior study 2/25/2025 and improved pulmonary expansion from 8/13/2024          Electronically signed: 03/25/2025 05:42 PM Cedric Charles MD    Ultrasound kidney and bladder 5/22/2025:  FINDINGS:     KIDNEYS:  Symmetric and normal size.  Right kidney: 11.3 x 4.7 x 4.2 cm. Volume 115.7 mL  Left kidney: 12.7 x 4.9 x 5.3 cm. Volume 171.6 mL     Right kidney  Normal echogenicity and contour.  Multiple cysts reidentified for example in the mid pole measuring 1.5 cm.  No hydronephrosis.  No shadowing calculi.  No perinephric fluid collections.     Left kidney  Normal echogenicity and contour.  Simple lower pole cyst reidentified measuring 1.2 cm.  No hydronephrosis.  No shadowing calculi.  No perinephric fluid collections.     URETERS:  Nonvisualized.     BLADDER:  Possibly empty and not visualized.        IMPRESSION:     Bilateral simple renal cysts. No hydronephrosis.     Bladder not visualized.    Recommend Nephrology follow-up in 4 months.    MABEL Soriano  Nephrology  6/17/2025      Portions of the record may have been created with voice recognition software. Occasional wrong word or \"sound a like\" substitutions may have occurred due to the inherent limitations of voice recognition software. Read the chart carefully and recognize, using context, where substitutions have occurred.          [1]   Current Outpatient Medications:     acetaminophen (TYLENOL) 500 mg tablet, Take 500 mg by mouth every 6 (six) hours as needed, Disp: , Rfl:     aspirin (ECOTRIN LOW STRENGTH) 81 mg EC tablet, Take 1 tablet " (81 mg total) by mouth daily, Disp: 100 tablet, Rfl: 0    atenolol (TENORMIN) 25 mg tablet, Take 1 tablet (25 mg total) by mouth daily, Disp: 30 tablet, Rfl: 0    b complex vitamins capsule, Take 1 capsule by mouth in the morning and 1 capsule in the evening., Disp: , Rfl:     Blood Glucose Monitoring Suppl (OneTouch Verio Reflect) w/Device KIT, Check blood sugars once daily. Please substitute with appropriate alternative as covered by patient's insurance. Dx: E11.65, Disp: 1 kit, Rfl: 0    Calcium Carb-Cholecalciferol (CALCIUM 600 + D PO), Take 1 tablet by mouth in the morning and 1 tablet in the evening., Disp: , Rfl:     Cranberry 250 MG TABS, Take by mouth, Disp: , Rfl:     fluticasone (FLONASE) 50 mcg/act nasal spray, 1 spray into each nostril daily, Disp: 16 g, Rfl: 1    furosemide (LASIX) 40 mg tablet, TAKE 1 TABLET BY MOUTH EVERY DAY *NEW PRESCRIPTION REQUEST*, Disp: 90 tablet, Rfl: 1    glucose blood (OneTouch Verio) test strip, Check blood sugars once daily. Please substitute with appropriate alternative as covered by patient's insurance. Dx: E11.65, Disp: 100 each, Rfl: 3    Lancets (onetouch ultrasoft) lancets, Use in the morning Use as instructed, Disp: 100 each, Rfl: 1    levothyroxine 50 mcg tablet, TAKE 1 TABLET BY MOUTH EVERY DAY *NEW PRESCRIPTION REQUEST*, Disp: 30 tablet, Rfl: 10    nystatin (MYCOSTATIN) powder, Apply topically 2 (two) times a day, Disp: 60 g, Rfl: 1    omeprazole (PriLOSEC) 40 MG capsule, TAKE 1 CAPSULE BY MOUTH TWICE A DAY *NEW PRESCRIPTION REQUEST*, Disp: 60 capsule, Rfl: 10    OneTouch Delica Lancets 33G MISC, Check blood sugars once daily. Please substitute with appropriate alternative as covered by patient's insurance. Dx: E11.65, Disp: 100 each, Rfl: 3    oxybutynin (DITROPAN-XL) 5 mg 24 hr tablet, TAKE 1 TABLET (5 MG TOTAL) BY MOUTH DAILY. *NEW PRESCRIPTION REQUEST*, Disp: 30 tablet, Rfl: 10    sertraline (ZOLOFT) 100 mg tablet, TAKE 1 TABLET BY MOUTH EVERY DAY *NEW  PRESCRIPTION REQUEST*, Disp: 30 tablet, Rfl: 10    simvastatin (ZOCOR) 40 mg tablet, TAKE 1 TABLET BY MOUTH EVERYDAY AT BEDTIME *NEW PRESCRIPTION REQUEST*, Disp: 30 tablet, Rfl: 10    warfarin (COUMADIN) 2.5 mg tablet, Take 1 tablet (2.5mg) Mon-Sat. Take 2 tablets (5mg) on Sun or as directed, Disp: 102 tablet, Rfl: 0  [2]   Past Medical History:  Diagnosis Date    Anemia 08/22/2018    Anxiety     Arthritis     AVB (atrioventricular block)     first degree    Cataract     CHF (congestive heart failure) (HCC)     COPD (chronic obstructive pulmonary disease) (HCC)     COPD, mild (HCC)     Coronary artery disease     Dislocation of right shoulder joint     Frequent UTI     GERD (gastroesophageal reflux disease)     H/O: pneumonia     Heme positive stool     Hyperlipidemia     Hypertension     Hypothyroidism     Morbid obesity with BMI of 50.0-59.9, adult ([facility])     Obesity, morbid (HCC) 08/22/2018    JANICE on CPAP     Pulmonary hypertension ([facility]) 08/22/2018    Severe aortic stenosis     Simple goiter     Skin cyst     within the armpits, right    Wears glasses    [3]   Past Surgical History:  Procedure Laterality Date    BREAST BIOPSY      CARDIAC CATHETERIZATION      CARDIAC ELECTROPHYSIOLOGY PROCEDURE N/A 8/12/2024    Procedure: Cardiac pacer implant;  Surgeon: Clarke Zuluaga MD;  Location:  CARDIAC CATH LAB;  Service: Cardiology    CARPAL TUNNEL RELEASE Bilateral     CHOLECYSTECTOMY      DILATION AND CURETTAGE OF UTERUS      HYSTEROSCOPY      MASTOID SURGERY      WA COLONOSCOPY FLX DX W/COLLJ SPEC WHEN PFRMD N/A 9/6/2018    Procedure: COLONOSCOPY;  Surgeon: Shree Sosa III, MD;  Location: [facility];  Service: Gastroenterology    WA ECHO TRANSESOPHAG R-T 2D W/PRB IMG ACQUISJ I&R N/A 10/9/2018    Procedure: INTRA-OP TRANSESOPHAGEAL ECHOCARDIOGRAM (GARRISON);  Surgeon: Kushal Camarena DO;  Location:  MAIN OR;  Service: Cardiac Surgery    WA ESOPHAGOGASTRODUODENOSCOPY TRANSORAL DIAGNOSTIC N/A 8/31/2018    Procedure:  ESOPHAGOGASTRODUODENOSCOPY (EGD);  Surgeon: Shree Sosa III, MD;  Location: [facility];  Service: Gastroenterology    TN REPLACE AORTIC VALVE OPENFEMORAL ARTERY APPROACH N/A 10/9/2018    Procedure: REPLACEMENT AORTIC VALVE TRANSCATHETER (TAVR) TRANSFEMORAL W/ 23 MM MENDOZA NOE S3 VALVE (ACCESS OF LEFT);  Surgeon: Kushal Camarena DO;  Location: [facility];  Service: Cardiac Surgery    TOTAL HIP ARTHROPLASTY Left 2007    TOTAL KNEE ARTHROPLASTY Bilateral    [4]   Allergies  Allergen Reactions    Latex Rash    Neosporin [Neomycin-Bacitracin Zn-Polymyx] Rash and Other (See Comments)     hives per [facility] order   [5]   Social History  Tobacco Use   Smoking Status Never    Passive exposure: Never   Smokeless Tobacco Never   [6]   Family History  Problem Relation Name Age of Onset    Diabetes Mother      Stroke Mother      Cancer Father      Lung cancer Father      Diabetes Sister      Heart disease Sister      Hypertension Sister      Coronary artery disease Family      Diabetes Family      Hypertension Family      Cancer Family      Stroke Family      Thyroid disease Neg Hx

## 2025-06-17 NOTE — ASSESSMENT & PLAN NOTE
Orders:    CBC and differential; Future    Iron Panel (Includes Ferritin, Iron Sat%, Iron, and TIBC); Future    Hemoglobin during hospitalization was slightly low at 8.6.  She denies active bleeding, is having black in stools due to iron supplementation.  Will check updated CBC as well as iron panel prior to follow-up.

## 2025-06-18 RX ORDER — ATENOLOL 25 MG/1
25 TABLET ORAL DAILY
Qty: 90 TABLET | Refills: 3 | Status: SHIPPED | OUTPATIENT
Start: 2025-06-18 | End: 2025-07-18

## 2025-06-19 DIAGNOSIS — K21.00 GASTROESOPHAGEAL REFLUX DISEASE WITH ESOPHAGITIS: ICD-10-CM

## 2025-06-19 PROBLEM — N30.00 ACUTE CYSTITIS WITHOUT HEMATURIA: Status: RESOLVED | Noted: 2025-05-20 | Resolved: 2025-06-19

## 2025-06-19 RX ORDER — OMEPRAZOLE 40 MG/1
40 CAPSULE, DELAYED RELEASE ORAL 2 TIMES DAILY
Qty: 180 CAPSULE | Refills: 1 | Status: SHIPPED | OUTPATIENT
Start: 2025-06-19

## 2025-06-23 ENCOUNTER — OFFICE VISIT (OUTPATIENT)
Dept: FAMILY MEDICINE CLINIC | Facility: CLINIC | Age: 85
End: 2025-06-23
Payer: MEDICARE

## 2025-06-23 VITALS
HEART RATE: 64 BPM | OXYGEN SATURATION: 92 % | SYSTOLIC BLOOD PRESSURE: 148 MMHG | HEIGHT: 55 IN | RESPIRATION RATE: 19 BRPM | DIASTOLIC BLOOD PRESSURE: 78 MMHG | BODY MASS INDEX: 39.34 KG/M2 | WEIGHT: 170 LBS

## 2025-06-23 DIAGNOSIS — R31.9 HEMATURIA, UNSPECIFIED TYPE: ICD-10-CM

## 2025-06-23 DIAGNOSIS — G89.29 CHRONIC PAIN OF RIGHT KNEE: ICD-10-CM

## 2025-06-23 DIAGNOSIS — N30.01 ACUTE CYSTITIS WITH HEMATURIA: Primary | ICD-10-CM

## 2025-06-23 DIAGNOSIS — M25.561 CHRONIC PAIN OF RIGHT KNEE: ICD-10-CM

## 2025-06-23 DIAGNOSIS — N17.9 AKI (ACUTE KIDNEY INJURY) (HCC): ICD-10-CM

## 2025-06-23 LAB
SL AMB  POCT GLUCOSE, UA: ABNORMAL
SL AMB LEUKOCYTE ESTERASE,UA: 15
SL AMB POCT BILIRUBIN,UA: ABNORMAL
SL AMB POCT BLOOD,UA: ABNORMAL
SL AMB POCT CLARITY,UA: CLEAR
SL AMB POCT COLOR,UA: YELLOW
SL AMB POCT KETONES,UA: ABNORMAL
SL AMB POCT NITRITE,UA: ABNORMAL
SL AMB POCT PH,UA: 6
SL AMB POCT SPECIFIC GRAVITY,UA: 1.01
SL AMB POCT URINE PROTEIN: 0.3
SL AMB POCT UROBILINOGEN: 3.5

## 2025-06-23 PROCEDURE — 81002 URINALYSIS NONAUTO W/O SCOPE: CPT | Performed by: NURSE PRACTITIONER

## 2025-06-23 PROCEDURE — G2211 COMPLEX E/M VISIT ADD ON: HCPCS | Performed by: NURSE PRACTITIONER

## 2025-06-23 PROCEDURE — 87086 URINE CULTURE/COLONY COUNT: CPT | Performed by: NURSE PRACTITIONER

## 2025-06-23 PROCEDURE — 99214 OFFICE O/P EST MOD 30 MIN: CPT | Performed by: NURSE PRACTITIONER

## 2025-06-23 RX ORDER — AMOXICILLIN AND CLAVULANATE POTASSIUM 500; 125 MG/1; MG/1
1 TABLET, FILM COATED ORAL EVERY 12 HOURS SCHEDULED
Qty: 14 TABLET | Refills: 0 | Status: SHIPPED | OUTPATIENT
Start: 2025-06-23 | End: 2025-06-30

## 2025-06-23 NOTE — PROGRESS NOTES
Name: Marisol Otoole      : 1940      MRN: [id number]  Encounter Provider: MABEL Jefferson  Encounter Date: 2025   Encounter department: [facility] [address]  :  Assessment & Plan  Acute cystitis with hematuria  Recently hospitalized and treated 1 month ago for UTI.  To begin Augmentin 1 tablet every 12 hours for 7 days.  Will send a urine for culture and follow.  Counseled the importance of increasing fluid intake and the avoidance of bladder irritants.  Orders:    amoxicillin-clavulanate (Augmentin) 500-125 mg per tablet; Take 1 tablet by mouth every 12 (twelve) hours for 7 days    ERIN (acute kidney injury) (HCC)  Creatinine clearance = 39.  BMP from 2025 revealed BUN = 39 and creatinine 1.30.  To continue care with nephrology.  To avoid nephrotoxic substances.       Chronic pain of right knee  Provided prescription for diclofenac gel due to chronic pain in right knee.  Orders:    Diclofenac Sodium (VOLTAREN) 1 %; Apply 2 g topically 4 (four) times a day    Hematuria, unspecified type    Orders:    POCT urine dip    Urine culture           History of Present Illness   Marisol presents reporting frequency and urgency for the last several days.  She is also experiencing mild dysuria.  Is recently admitted on 2025 - 2025 due to a fall and found to have UTI.  She was discharged on Keflex.  She was recently seen by nephrology.  She has been increasing her fluid intake    Urinary Tract Infection   Associated symptoms include frequency and urgency.     Review of Systems   Constitutional: Negative.    HENT: Negative.     Eyes: Negative.    Respiratory: Negative.     Cardiovascular: Negative.    Gastrointestinal: Negative.    Endocrine: Negative.    Genitourinary:  Positive for dysuria (mild), frequency and urgency.   Musculoskeletal: Negative.    Skin: Negative.    Allergic/Immunologic: Negative.    Neurological: Negative.    Hematological: Negative.   "  Psychiatric/Behavioral: Negative.         Objective   /78 (BP Location: Left arm, Patient Position: Sitting)   Pulse 64   Resp 19   Ht 4' 6\" (1.372 m)   Wt 77.1 kg (170 lb)   SpO2 92% Comment: 2L NC  BMI 40.99 kg/m²      Physical Exam  Vitals and nursing note reviewed.   Constitutional:       General: She is not in acute distress.     Appearance: Normal appearance. She is well-developed. She is not ill-appearing, toxic-appearing or diaphoretic.   HENT:      Head: Normocephalic and atraumatic.     Eyes:      Conjunctiva/sclera: Conjunctivae normal.       Cardiovascular:      Rate and Rhythm: Normal rate and regular rhythm.      Heart sounds: Normal heart sounds. No murmur heard.  Pulmonary:      Effort: Pulmonary effort is normal. No respiratory distress.      Breath sounds: Normal breath sounds. No wheezing or rales.      Comments: On 4 L of oxygen via nasal cannula  Chest:      Chest wall: No tenderness.   Abdominal:      General: There is no distension.      Palpations: Abdomen is soft. There is no mass.      Tenderness: There is no abdominal tenderness. There is no guarding or rebound.      Hernia: No hernia is present.     Musculoskeletal:      Cervical back: Neck supple.      Comments: Ambulates via walker     Skin:     General: Skin is warm and dry.      Capillary Refill: Capillary refill takes less than 2 seconds.     Neurological:      General: No focal deficit present.      Mental Status: She is alert and oriented to person, place, and time.     Psychiatric:         Mood and Affect: Mood normal.         Behavior: Behavior normal.         Thought Content: Thought content normal.         Judgment: Judgment normal.         "

## 2025-06-24 LAB — BACTERIA UR CULT: NORMAL

## 2025-06-25 ENCOUNTER — TELEPHONE (OUTPATIENT)
Age: 85
End: 2025-06-25

## 2025-06-25 NOTE — TELEPHONE ENCOUNTER
Adrienne, PT from [facility] called.  [facility] pt's PT evaluation was completed and pt will require additional treatment at home.  Confirmed pt was recently seen in office.  [facility] will fax over orders.

## 2025-06-26 ENCOUNTER — APPOINTMENT (OUTPATIENT)
Dept: LAB | Facility: HOSPITAL | Age: 85
End: 2025-06-26
Payer: MEDICARE

## 2025-06-26 ENCOUNTER — TELEPHONE (OUTPATIENT)
Age: 85
End: 2025-06-26

## 2025-06-26 ENCOUNTER — ANTICOAG VISIT (OUTPATIENT)
Dept: CARDIOLOGY CLINIC | Facility: CLINIC | Age: 85
End: 2025-06-26

## 2025-06-26 ENCOUNTER — TELEPHONE (OUTPATIENT)
Dept: CARDIOLOGY CLINIC | Facility: CLINIC | Age: 85
End: 2025-06-26

## 2025-06-26 ENCOUNTER — TELEPHONE (OUTPATIENT)
Dept: LAB | Facility: HOSPITAL | Age: 85
End: 2025-06-26

## 2025-06-26 DIAGNOSIS — I48.0 PAROXYSMAL ATRIAL FIBRILLATION (HCC): ICD-10-CM

## 2025-06-26 LAB
INR PPP: 2.79 (ref 0.85–1.19)
PROTHROMBIN TIME: 30.1 SECONDS (ref 12.3–15)

## 2025-06-26 PROCEDURE — 36415 COLL VENOUS BLD VENIPUNCTURE: CPT

## 2025-06-26 PROCEDURE — 85610 PROTHROMBIN TIME: CPT

## 2025-06-26 NOTE — TELEPHONE ENCOUNTER
Pt is worried she has to many pills. I let her know she won't get a refill from us unless she requests one and not to worry.

## 2025-06-26 NOTE — TELEPHONE ENCOUNTER
Hi,  Requesting a pt/inr home draw to be done around 7/10. Please call the pt to set up. Script is in the chart.     Thank you.

## 2025-07-03 ENCOUNTER — TELEPHONE (OUTPATIENT)
Age: 85
End: 2025-07-03

## 2025-07-03 ENCOUNTER — OFFICE VISIT (OUTPATIENT)
Dept: FAMILY MEDICINE CLINIC | Facility: CLINIC | Age: 85
End: 2025-07-03
Payer: MEDICARE

## 2025-07-03 VITALS — DIASTOLIC BLOOD PRESSURE: 76 MMHG | HEART RATE: 60 BPM | OXYGEN SATURATION: 93 % | SYSTOLIC BLOOD PRESSURE: 136 MMHG

## 2025-07-03 DIAGNOSIS — J44.9 COPD, MILD (HCC): ICD-10-CM

## 2025-07-03 DIAGNOSIS — F33.1 MODERATE EPISODE OF RECURRENT MAJOR DEPRESSIVE DISORDER (HCC): ICD-10-CM

## 2025-07-03 DIAGNOSIS — I50.32 CHRONIC HEART FAILURE WITH PRESERVED EJECTION FRACTION (HFPEF) (HCC): ICD-10-CM

## 2025-07-03 DIAGNOSIS — N17.9 AKI (ACUTE KIDNEY INJURY) (HCC): ICD-10-CM

## 2025-07-03 DIAGNOSIS — Z00.00 HEALTHCARE MAINTENANCE: Primary | ICD-10-CM

## 2025-07-03 DIAGNOSIS — E03.9 ACQUIRED HYPOTHYROIDISM: ICD-10-CM

## 2025-07-03 DIAGNOSIS — Z95.0 S/P PLACEMENT OF CARDIAC PACEMAKER: ICD-10-CM

## 2025-07-03 DIAGNOSIS — D50.8 IRON DEFICIENCY ANEMIA SECONDARY TO INADEQUATE DIETARY IRON INTAKE: ICD-10-CM

## 2025-07-03 DIAGNOSIS — I25.10 CORONARY ARTERY DISEASE INVOLVING NATIVE CORONARY ARTERY OF NATIVE HEART WITHOUT ANGINA PECTORIS: ICD-10-CM

## 2025-07-03 DIAGNOSIS — I48.0 PAROXYSMAL ATRIAL FIBRILLATION (HCC): ICD-10-CM

## 2025-07-03 PROBLEM — R00.2 HEART PALPITATIONS: Status: RESOLVED | Noted: 2025-04-10 | Resolved: 2025-07-03

## 2025-07-03 PROCEDURE — 99214 OFFICE O/P EST MOD 30 MIN: CPT | Performed by: NURSE PRACTITIONER

## 2025-07-03 PROCEDURE — G2211 COMPLEX E/M VISIT ADD ON: HCPCS | Performed by: NURSE PRACTITIONER

## 2025-07-03 NOTE — TELEPHONE ENCOUNTER
Adrienne, - PT from [facility], called in to inform pt requested to delay PT evaluation due to having an appt today & holiday tomorrow.     Adrienne r/s for next week.

## 2025-07-03 NOTE — ASSESSMENT & PLAN NOTE
Wt Readings from Last 3 Encounters:   06/23/25 77.1 kg (170 lb)   06/17/25 77.1 kg (170 lb)   05/24/25 77.1 kg (170 lb)         Continue care with cardiology.  Advise compression stockings for bilateral leg swelling.

## 2025-07-03 NOTE — ASSESSMENT & PLAN NOTE
Will obtain current iron panel.      Orders:    Iron Panel (Includes Ferritin, Iron Sat%, Iron, and TIBC); Future

## 2025-07-03 NOTE — PROGRESS NOTES
Name: Marisol Otoole      : 1940      MRN: [id number]  Encounter Provider: MABEL Hallman  Encounter Date: 7/3/2025   Encounter department: [facility] [address]  :  Assessment & Plan  Healthcare maintenance    Orders:    CBC and differential; Future    Comprehensive metabolic panel; Future    Lipid panel; Future    TSH, 3rd generation with Free T4 reflex; Future    ERIN (acute kidney injury) ([facility])  Will obtain repeat kidney function.  Orders:    Comprehensive metabolic panel; Future    Iron deficiency anemia secondary to inadequate dietary iron intake  Will obtain current iron panel.      Orders:    Iron Panel (Includes Ferritin, Iron Sat%, Iron, and TIBC); Future    Chronic heart failure with preserved ejection fraction (HFpEF) ([facility])  Wt Readings from Last 3 Encounters:   25 77.1 kg (170 lb)   25 77.1 kg (170 lb)   25 77.1 kg (170 lb)         Continue care with cardiology.  Advise compression stockings for bilateral leg swelling.           Coronary artery disease involving native coronary artery of native heart without angina pectoris  Continue care with cardiology.       Paroxysmal atrial fibrillation (HCC)  Continue care with cardiology.       COPD, mild (HCC)  Continues on 4 L nasal cannula.       Acquired hypothyroidism  Continue on levothyroxine.  Will obtain current thyroid panel.       Moderate episode of recurrent major depressive disorder (HCC)  Continue on sertraline daily.         S/P placement of cardiac pacemaker                History of Present Illness   Patient presents for routine follow-up.  Recently admitted for UTI.  She denies current UTI symptoms.  She did have a recent urine culture about 2 weeks ago which was negative.  Patient is concerned with bilateral knee pain and buckling.  She states that her knees have been giving out and she is falling or very near falling.  She is requesting a wheelchair as she feels this will help  her get around.  She is participating in physical therapy.  She is scheduled with orthopedic surgeon next week.      Review of Systems   Constitutional:  Negative for chills and fever.   HENT:  Negative for ear pain and sore throat.    Eyes:  Negative for pain and visual disturbance.   Respiratory:  Negative for cough and shortness of breath.    Cardiovascular:  Negative for chest pain and palpitations.   Gastrointestinal:  Negative for abdominal pain and vomiting.   Genitourinary:  Negative for dysuria and hematuria.   Musculoskeletal:  Positive for arthralgias and gait problem. Negative for back pain.   Skin:  Negative for color change and rash.   Neurological:  Negative for seizures and syncope.   Psychiatric/Behavioral:  Positive for dysphoric mood.    All other systems reviewed and are negative.      Objective   /76   Pulse 60   SpO2 93%      Physical Exam  Vitals and nursing note reviewed.   Constitutional:       General: She is not in acute distress.     Appearance: She is well-developed.   HENT:      Head: Normocephalic and atraumatic.     Cardiovascular:      Rate and Rhythm: Normal rate and regular rhythm.      Heart sounds: Murmur heard.   Pulmonary:      Effort: Pulmonary effort is normal. No respiratory distress.      Breath sounds: Normal breath sounds.     Musculoskeletal:         General: No swelling.      Cervical back: Neck supple.     Skin:     General: Skin is warm and dry.      Capillary Refill: Capillary refill takes less than 2 seconds.     Neurological:      Mental Status: She is alert.     Psychiatric:         Mood and Affect: Mood normal.

## 2025-07-07 ENCOUNTER — TELEPHONE (OUTPATIENT)
Dept: LAB | Facility: HOSPITAL | Age: 85
End: 2025-07-07

## 2025-07-07 ENCOUNTER — TELEPHONE (OUTPATIENT)
Dept: FAMILY MEDICINE CLINIC | Facility: CLINIC | Age: 85
End: 2025-07-07

## 2025-07-07 NOTE — TELEPHONE ENCOUNTER
----- Message from MABEL Gunter sent at 7/3/2025  2:08 PM EDT -----  Please have mobile labs set up.

## 2025-07-08 ENCOUNTER — NURSE TRIAGE (OUTPATIENT)
Age: 85
End: 2025-07-08

## 2025-07-08 NOTE — TELEPHONE ENCOUNTER
"REASON FOR CONVERSATION: Joint Swelling, Foot Swelling, and Possible UTI    SYMPTOMS: increase urgency and increase urinary frequency, mild pain with urination. Per nurse patient seems to be having a hard time remembering things. Visiting nurse reports moderate swelling to right ankle/foot and some bruising to the ankle area.  Patient states 8/10 pain in her bilateral knees and right ankle/foot.    OTHER HEALTH INFORMATION: patient was recently seen in the office last Thursday 7/3/25 and she denied UTI symptoms. Patient was treated with augmentin on 6/23 but then her urine culture was negative for infection and was asked to stop the antibiotic on 6/26/25.  On office visit on 7/3 patient reported her knees have been giving out and she is falling or very near falling but when asked if she fell over the weekend she denied.     PROTOCOL DISPOSITION: See Today in Office (overriding See Within 3 Days in Office)    CARE ADVICE PROVIDED: patient was given an appointment for today at 4 PM with Caleb Giles to further assess her ankle/foot and see if she needs xray and to get urine sample to rule out UTI. The person who lives with her and takes her to her appointments has an appointment this afternoon somewhere else, they will call ahead of time if she feels like she can't make it to the appointment with patient. Advise visiting nurse to have patient elevate her foot, ice it and wrap it in the meantime. To call back with worsening symptoms.     PRACTICE FOLLOW-UP: please review and follow up with patient if she needs xray and urine sample prior to office visit.           Reason for Disposition   MODERATE ankle swelling (e.g., can't move joint normally, interferes with normal activities) (Exceptions: Itchy, localized swelling; swelling is chronic.)    Answer Assessment - Initial Assessment Questions  1. LOCATION: \"Which ankle is swollen?\" \"Where is the swelling?\"      Right ankle/ foot swelling  2. ONSET: \"When did the " "swelling start?\"      Visiting nurse noticed it today   3. SWELLING: \"How bad is the swelling?\" Or, \"How large is it?\" (e.g., mild, moderate, severe; size of localized swelling)       moderate  4. PAIN: \"Is there any pain?\" If Yes, ask: \"How bad is it?\" (Scale 0-10; or none, mild, moderate, severe)      8/10 to her ankle and knees  5. CAUSE: \"What do you think caused the ankle swelling?\"      Unsure. Visiting nurse is concern that she might of twisted it since she is having UTI symptoms (increase urgency and increase frequency)  6. OTHER SYMPTOMS: \"Do you have any other symptoms?\" (e.g., fever, chest pain, difficulty breathing, calf pain)      UTI symptoms (increase urgency and increase frequency) and per visiting nurse increase frequency (hard time remembering things)    Answer Assessment - Initial Assessment Questions  1. SYMPTOM: \"What's the main symptom you're concerned about?\" (e.g., frequency, incontinence)      Increase urgency and frequency  2. ONSET: \"When did the  symptoms  start?\"      Per visiting nurse the patient states a \"few days ago\"  3. PAIN: \"Is there any pain?\" If Yes, ask: \"How bad is it?\" (Scale: 1-10; mild, moderate, severe)      Mild pain with urination   4. CAUSE: \"What do you think is causing the symptoms?\"      Possible UTI  5. OTHER SYMPTOMS: \"Do you have any other symptoms?\" (e.g., blood in urine, fever, flank pain, pain with urination)      Mild pain with urination. Denies blood in the urine, fever or flank pain    Protocols used: Ankle Swelling-Adult-OH, Urinary Symptoms-Adult-OH    "

## 2025-07-09 ENCOUNTER — HOSPITAL ENCOUNTER (OUTPATIENT)
Dept: RADIOLOGY | Facility: HOSPITAL | Age: 85
Discharge: HOME | End: 2025-07-09
Payer: MEDICARE

## 2025-07-09 ENCOUNTER — OFFICE VISIT (OUTPATIENT)
Dept: FAMILY MEDICINE CLINIC | Facility: CLINIC | Age: 85
End: 2025-07-09
Payer: MEDICARE

## 2025-07-09 VITALS
WEIGHT: 174.4 LBS | TEMPERATURE: 97 F | OXYGEN SATURATION: 95 % | HEART RATE: 60 BPM | DIASTOLIC BLOOD PRESSURE: 60 MMHG | SYSTOLIC BLOOD PRESSURE: 130 MMHG | BODY MASS INDEX: 40.36 KG/M2 | RESPIRATION RATE: 18 BRPM | HEIGHT: 55 IN

## 2025-07-09 DIAGNOSIS — R35.0 URINE FREQUENCY: Primary | ICD-10-CM

## 2025-07-09 DIAGNOSIS — M79.671 RIGHT FOOT PAIN: ICD-10-CM

## 2025-07-09 DIAGNOSIS — R35.1 EXCESSIVE URINATION AT NIGHT: ICD-10-CM

## 2025-07-09 LAB
SL AMB  POCT GLUCOSE, UA: ABNORMAL
SL AMB LEUKOCYTE ESTERASE,UA: 500
SL AMB POCT BILIRUBIN,UA: ABNORMAL
SL AMB POCT BLOOD,UA: 200
SL AMB POCT CLARITY,UA: ABNORMAL
SL AMB POCT COLOR,UA: ABNORMAL
SL AMB POCT KETONES,UA: ABNORMAL
SL AMB POCT NITRITE,UA: ABNORMAL
SL AMB POCT PH,UA: 6
SL AMB POCT SPECIFIC GRAVITY,UA: 1.01
SL AMB POCT URINE PROTEIN: 30
SL AMB POCT UROBILINOGEN: 3.5

## 2025-07-09 PROCEDURE — 99214 OFFICE O/P EST MOD 30 MIN: CPT

## 2025-07-09 PROCEDURE — 87086 URINE CULTURE/COLONY COUNT: CPT

## 2025-07-09 PROCEDURE — 87186 SC STD MICRODIL/AGAR DIL: CPT

## 2025-07-09 PROCEDURE — 81002 URINALYSIS NONAUTO W/O SCOPE: CPT

## 2025-07-09 PROCEDURE — 87077 CULTURE AEROBIC IDENTIFY: CPT

## 2025-07-09 PROCEDURE — 73630 X-RAY EXAM OF FOOT: CPT

## 2025-07-09 RX ORDER — DOXYCYCLINE 100 MG/1
100 CAPSULE ORAL EVERY 12 HOURS SCHEDULED
Qty: 14 CAPSULE | Refills: 0 | Status: SHIPPED | OUTPATIENT
Start: 2025-07-09 | End: 2025-07-16

## 2025-07-09 NOTE — PROGRESS NOTES
Name: Marisol Otoole      : 1940      MRN: [id number]  Encounter Provider: MABEL Donahue  Encounter Date: 2025   Encounter department: [facility] [address]  :  Assessment & Plan  Urine frequency  Will treat with doxycycline, referral placed to urology will call with results of culture  Orders:  •  POCT urine dip  •  Urine culture; Future  •  doxycycline hyclate (VIBRAMYCIN) 100 mg capsule; Take 1 capsule (100 mg total) by mouth every 12 (twelve) hours for 7 days    Excessive urination at night  Will treat with doxycycline, referral placed to urology will call with results of culture  Orders:  •  Ambulatory Referral to Urology; Future    Right foot pain  Swelling and pain in the right foot which has been going on for some time now, recommend taking your full diuretic.  Have the lab work ordered by Diya next week and have x-ray as you are at high risk for fracture.  Will call with results of x-ray  Orders:  •  XR foot 3+ vw right; Future          Falls Plan of Care: Recommended assistive device to help with gait and balance. Assessed feet and footwear.       History of Present Illness   Marisol is here for right foot pain and swelling. Has been only taking half her water pill. Also, her home health aid thought she had a uti.     Urinary Tract Infection   Associated symptoms include frequency and urgency. Pertinent negatives include no chills or hematuria.     Review of Systems   Constitutional:  Negative for chills, diaphoresis and fever.   HENT:  Negative for congestion and sore throat.    Respiratory:  Positive for shortness of breath. Negative for cough.    Cardiovascular:  Negative for chest pain and palpitations.   Genitourinary:  Positive for frequency and urgency. Negative for dysuria and hematuria.   Skin:  Negative for rash.   Neurological:  Negative for dizziness and headaches.   All other systems reviewed and are negative.      Objective   /60  "(BP Location: Left arm, Cuff Size: Standard)   Pulse 60   Temp (!) 97 °F (36.1 °C)   Resp 18   Ht 4' 6\" (1.372 m)   SpO2 95% Comment: 2L NC  BMI 40.99 kg/m²      Physical Exam  Vitals and nursing note reviewed.   Constitutional:       General: She is not in acute distress.     Appearance: Normal appearance. She is well-developed. She is not ill-appearing.   HENT:      Head: Normocephalic and atraumatic.      Right Ear: Tympanic membrane, ear canal and external ear normal. There is no impacted cerumen.      Left Ear: Tympanic membrane is erythematous.      Nose: Nose normal. No congestion or rhinorrhea.      Mouth/Throat:      Mouth: Mucous membranes are moist.      Pharynx: No posterior oropharyngeal erythema.     Eyes:      Extraocular Movements: Extraocular movements intact.      Conjunctiva/sclera: Conjunctivae normal.      Pupils: Pupils are equal, round, and reactive to light.       Cardiovascular:      Rate and Rhythm: Normal rate and regular rhythm.      Pulses: Normal pulses.      Heart sounds: Normal heart sounds. No murmur heard.  Pulmonary:      Effort: Pulmonary effort is normal. No respiratory distress.      Breath sounds: Normal breath sounds.   Abdominal:      General: Bowel sounds are normal. There is no distension.      Palpations: Abdomen is soft.      Tenderness: There is no abdominal tenderness.     Musculoskeletal:         General: No swelling or tenderness. Normal range of motion.      Cervical back: Normal range of motion and neck supple. No tenderness.      Right lower leg: No edema.      Left lower leg: No edema.   Lymphadenopathy:      Cervical: No cervical adenopathy.     Skin:     General: Skin is warm and dry.      Capillary Refill: Capillary refill takes less than 2 seconds.     Neurological:      General: No focal deficit present.      Mental Status: She is alert and oriented to person, place, and time.     Psychiatric:         Mood and Affect: Mood normal.         Behavior: " Behavior normal.         Thought Content: Thought content normal.         Judgment: Judgment normal.

## 2025-07-10 ENCOUNTER — TELEPHONE (OUTPATIENT)
Age: 85
End: 2025-07-10

## 2025-07-10 ENCOUNTER — APPOINTMENT (OUTPATIENT)
Dept: LAB | Facility: HOSPITAL | Age: 85
End: 2025-07-10
Payer: MEDICARE

## 2025-07-10 ENCOUNTER — ANTICOAG VISIT (OUTPATIENT)
Dept: CARDIOLOGY CLINIC | Facility: CLINIC | Age: 85
End: 2025-07-10

## 2025-07-10 ENCOUNTER — RESULTS FOLLOW-UP (OUTPATIENT)
Dept: CARDIOLOGY CLINIC | Facility: CLINIC | Age: 85
End: 2025-07-10

## 2025-07-10 ENCOUNTER — TELEPHONE (OUTPATIENT)
Dept: CARDIOLOGY CLINIC | Facility: CLINIC | Age: 85
End: 2025-07-10

## 2025-07-10 DIAGNOSIS — D50.8 IRON DEFICIENCY ANEMIA SECONDARY TO INADEQUATE DIETARY IRON INTAKE: ICD-10-CM

## 2025-07-10 DIAGNOSIS — N17.9 AKI (ACUTE KIDNEY INJURY) (HCC): ICD-10-CM

## 2025-07-10 DIAGNOSIS — I48.0 PAROXYSMAL ATRIAL FIBRILLATION (HCC): ICD-10-CM

## 2025-07-10 DIAGNOSIS — Z00.00 HEALTHCARE MAINTENANCE: ICD-10-CM

## 2025-07-10 LAB
ALBUMIN SERPL BCG-MCNC: 3.8 G/DL (ref 3.5–5)
ALP SERPL-CCNC: 74 U/L (ref 34–104)
ALT SERPL W P-5'-P-CCNC: 21 U/L (ref 7–52)
ANION GAP SERPL CALCULATED.3IONS-SCNC: 11 MMOL/L (ref 4–13)
AST SERPL W P-5'-P-CCNC: 37 U/L (ref 13–39)
BASOPHILS # BLD AUTO: 0.07 THOUSANDS/ÂΜL (ref 0–0.1)
BASOPHILS NFR BLD AUTO: 1 % (ref 0–1)
BILIRUB SERPL-MCNC: 0.32 MG/DL (ref 0.2–1)
BUN SERPL-MCNC: 38 MG/DL (ref 5–25)
CALCIUM SERPL-MCNC: 9.8 MG/DL (ref 8.4–10.2)
CHLORIDE SERPL-SCNC: 105 MMOL/L (ref 96–108)
CHOLEST SERPL-MCNC: 113 MG/DL (ref ?–200)
CO2 SERPL-SCNC: 22 MMOL/L (ref 21–32)
CREAT SERPL-MCNC: 1.48 MG/DL (ref 0.6–1.3)
EOSINOPHIL # BLD AUTO: 0.71 THOUSAND/ÂΜL (ref 0–0.61)
EOSINOPHIL NFR BLD AUTO: 9 % (ref 0–6)
ERYTHROCYTE [DISTWIDTH] IN BLOOD BY AUTOMATED COUNT: 17.6 % (ref 11.6–15.1)
FERRITIN SERPL-MCNC: 302 NG/ML (ref 30–307)
GFR SERPL CREATININE-BSD FRML MDRD: 32 ML/MIN/1.73SQ M
GLUCOSE P FAST SERPL-MCNC: 95 MG/DL (ref 65–99)
HCT VFR BLD AUTO: 31.9 % (ref 34.8–46.1)
HDLC SERPL-MCNC: 50 MG/DL
HGB BLD-MCNC: 9.6 G/DL (ref 11.5–15.4)
IMM GRANULOCYTES # BLD AUTO: 0.03 THOUSAND/UL (ref 0–0.2)
IMM GRANULOCYTES NFR BLD AUTO: 0 % (ref 0–2)
INR PPP: 2.46 (ref 0.85–1.19)
IRON SATN MFR SERPL: 11 % (ref 15–50)
IRON SERPL-MCNC: 32 UG/DL (ref 50–212)
LDLC SERPL CALC-MCNC: 41 MG/DL (ref 0–100)
LYMPHOCYTES # BLD AUTO: 1.83 THOUSANDS/ÂΜL (ref 0.6–4.47)
LYMPHOCYTES NFR BLD AUTO: 23 % (ref 14–44)
MCH RBC QN AUTO: 28 PG (ref 26.8–34.3)
MCHC RBC AUTO-ENTMCNC: 30.1 G/DL (ref 31.4–37.4)
MCV RBC AUTO: 93 FL (ref 82–98)
MONOCYTES # BLD AUTO: 0.79 THOUSAND/ÂΜL (ref 0.17–1.22)
MONOCYTES NFR BLD AUTO: 10 % (ref 4–12)
NEUTROPHILS # BLD AUTO: 4.65 THOUSANDS/ÂΜL (ref 1.85–7.62)
NEUTS SEG NFR BLD AUTO: 57 % (ref 43–75)
NONHDLC SERPL-MCNC: 63 MG/DL
NRBC BLD AUTO-RTO: 0 /100 WBCS
PLATELET # BLD AUTO: 380 THOUSANDS/UL (ref 149–390)
PMV BLD AUTO: 9.7 FL (ref 8.9–12.7)
POTASSIUM SERPL-SCNC: 4.6 MMOL/L (ref 3.5–5.3)
PROT SERPL-MCNC: 7.5 G/DL (ref 6.4–8.4)
PROTHROMBIN TIME: 26.4 SECONDS (ref 12.3–15)
RBC # BLD AUTO: 3.43 MILLION/UL (ref 3.81–5.12)
SODIUM SERPL-SCNC: 138 MMOL/L (ref 135–147)
TIBC SERPL-MCNC: 289.8 UG/DL (ref 250–450)
TRANSFERRIN SERPL-MCNC: 207 MG/DL (ref 203–362)
TRIGL SERPL-MCNC: 112 MG/DL (ref ?–150)
TSH SERPL DL<=0.05 MIU/L-ACNC: 1.26 UIU/ML (ref 0.45–4.5)
UIBC SERPL-MCNC: 258 UG/DL (ref 155–355)
WBC # BLD AUTO: 8.08 THOUSAND/UL (ref 4.31–10.16)

## 2025-07-10 PROCEDURE — 80061 LIPID PANEL: CPT

## 2025-07-10 PROCEDURE — 82728 ASSAY OF FERRITIN: CPT

## 2025-07-10 PROCEDURE — 83540 ASSAY OF IRON: CPT

## 2025-07-10 PROCEDURE — 80053 COMPREHEN METABOLIC PANEL: CPT

## 2025-07-10 PROCEDURE — 84443 ASSAY THYROID STIM HORMONE: CPT

## 2025-07-10 PROCEDURE — 85025 COMPLETE CBC W/AUTO DIFF WBC: CPT

## 2025-07-10 PROCEDURE — 85610 PROTHROMBIN TIME: CPT

## 2025-07-10 PROCEDURE — 36415 COLL VENOUS BLD VENIPUNCTURE: CPT

## 2025-07-10 PROCEDURE — 83550 IRON BINDING TEST: CPT

## 2025-07-10 NOTE — TELEPHONE ENCOUNTER
New Patient      Insurance   Current Insurance?  only   Insurance E-verified? No []    Yes [x]    If no, explain:    History   Reason for appointment/active symptoms? Incontinence     Has the patient had any previous Urologist(s)? No [x]    Yes []  If yes, who:    Was the patient seen in the ED? No [x]    Yes []     Labs/Imaging (Including Out of Network)? No []    Yes [x]     Records Requested? No [x]    Yes []  If yes, explain:     Records Visible in EPIC? No []    Yes [x]     Personal history of cancer? No [x]    Yes []   If yes, explain:      Appointment   Office location preference: [place]  ?   Appointment Details   Date: 10/6/25  Time: 120pm  Location: [place]   Provider: Ronel REESE  Does the appointment need further review? No [x]    Yes []  If yes, explain:

## 2025-07-10 NOTE — TELEPHONE ENCOUNTER
Lazaro a PT from [facility] home [facility] is calling, letting provider know that she saw patient today for the PT evaluation, and patient is going to need further PT treatment. Lazaro is asking if the provider would sign off on homehealth orders for further PT treatment.    Please give lazaro a call back at [phone] and she stated if she doesn't answer, we could leave a detailed message.

## 2025-07-11 ENCOUNTER — TELEPHONE (OUTPATIENT)
Age: 85
End: 2025-07-11

## 2025-07-11 NOTE — TELEPHONE ENCOUNTER
Pt called inquiring about blood work that was done yesterday and if they checked to see if she any an infection in her blood.  Pt is concerned because she is currently being treated for a UTI and last time she had a UTI it traveled to her blood stream.  Advised pt to reach out to PCP for further assistance.

## 2025-07-11 NOTE — TELEPHONE ENCOUNTER
[facility] called asking for orders that were faxed to them for patient Signed by Diya are missing a date. She is asking if a date could be added to the paper and initialed and sent back to them. Order number is [id number]. I did advise that Diya is out of office till 7/15 so this will be done next week. They understood.

## 2025-07-12 LAB — BACTERIA UR CULT: ABNORMAL

## 2025-07-13 ENCOUNTER — HOSPITAL ENCOUNTER (EMERGENCY)
Facility: HOSPITAL | Age: 85
Discharge: HOME | End: 2025-07-14
Attending: EMERGENCY MEDICINE | Admitting: EMERGENCY MEDICINE
Payer: MEDICARE

## 2025-07-13 ENCOUNTER — APPOINTMENT (EMERGENCY)
Dept: CT IMAGING | Facility: HOSPITAL | Age: 85
End: 2025-07-13
Payer: MEDICARE

## 2025-07-13 DIAGNOSIS — R07.9 CHEST PAIN: Primary | ICD-10-CM

## 2025-07-13 LAB
2HR DELTA HS TROPONIN: 1 NG/L
ALBUMIN SERPL BCG-MCNC: 3.8 G/DL (ref 3.5–5)
ALP SERPL-CCNC: 70 U/L (ref 34–104)
ALT SERPL W P-5'-P-CCNC: 16 U/L (ref 7–52)
ANION GAP SERPL CALCULATED.3IONS-SCNC: 7 MMOL/L (ref 4–13)
ANION GAP SERPL CALCULATED.3IONS-SCNC: 7 MMOL/L (ref 4–13)
AST SERPL W P-5'-P-CCNC: 25 U/L (ref 13–39)
BASOPHILS # BLD AUTO: 0.06 THOUSANDS/ÂΜL (ref 0–0.1)
BASOPHILS NFR BLD AUTO: 1 % (ref 0–1)
BILIRUB SERPL-MCNC: 0.36 MG/DL (ref 0.2–1)
BUN SERPL-MCNC: 40 MG/DL (ref 5–25)
BUN SERPL-MCNC: 42 MG/DL (ref 5–25)
CALCIUM SERPL-MCNC: 10.4 MG/DL (ref 8.4–10.2)
CALCIUM SERPL-MCNC: 9.8 MG/DL (ref 8.4–10.2)
CARDIAC TROPONIN I PNL SERPL HS: 10 NG/L (ref ?–50)
CARDIAC TROPONIN I PNL SERPL HS: 9 NG/L (ref ?–50)
CHLORIDE SERPL-SCNC: 106 MMOL/L (ref 96–108)
CHLORIDE SERPL-SCNC: 109 MMOL/L (ref 96–108)
CO2 SERPL-SCNC: 26 MMOL/L (ref 21–32)
CO2 SERPL-SCNC: 27 MMOL/L (ref 21–32)
CREAT SERPL-MCNC: 1.43 MG/DL (ref 0.6–1.3)
CREAT SERPL-MCNC: 1.61 MG/DL (ref 0.6–1.3)
EOSINOPHIL # BLD AUTO: 0.48 THOUSAND/ÂΜL (ref 0–0.61)
EOSINOPHIL NFR BLD AUTO: 5 % (ref 0–6)
ERYTHROCYTE [DISTWIDTH] IN BLOOD BY AUTOMATED COUNT: 17.1 % (ref 11.6–15.1)
GFR SERPL CREATININE-BSD FRML MDRD: 28 ML/MIN/1.73SQ M
GFR SERPL CREATININE-BSD FRML MDRD: 33 ML/MIN/1.73SQ M
GLUCOSE SERPL-MCNC: 124 MG/DL (ref 65–140)
GLUCOSE SERPL-MCNC: 99 MG/DL (ref 65–140)
HCT VFR BLD AUTO: 31.7 % (ref 34.8–46.1)
HGB BLD-MCNC: 10 G/DL (ref 11.5–15.4)
IMM GRANULOCYTES # BLD AUTO: 0.03 THOUSAND/UL (ref 0–0.2)
IMM GRANULOCYTES NFR BLD AUTO: 0 % (ref 0–2)
LYMPHOCYTES # BLD AUTO: 1.97 THOUSANDS/ÂΜL (ref 0.6–4.47)
LYMPHOCYTES NFR BLD AUTO: 20 % (ref 14–44)
MCH RBC QN AUTO: 28.2 PG (ref 26.8–34.3)
MCHC RBC AUTO-ENTMCNC: 31.5 G/DL (ref 31.4–37.4)
MCV RBC AUTO: 89 FL (ref 82–98)
MONOCYTES # BLD AUTO: 0.95 THOUSAND/ÂΜL (ref 0.17–1.22)
MONOCYTES NFR BLD AUTO: 10 % (ref 4–12)
NEUTROPHILS # BLD AUTO: 6.41 THOUSANDS/ÂΜL (ref 1.85–7.62)
NEUTS SEG NFR BLD AUTO: 64 % (ref 43–75)
NRBC BLD AUTO-RTO: 0 /100 WBCS
PLATELET # BLD AUTO: 345 THOUSANDS/UL (ref 149–390)
PMV BLD AUTO: 9.1 FL (ref 8.9–12.7)
POTASSIUM SERPL-SCNC: 3.4 MMOL/L (ref 3.5–5.3)
POTASSIUM SERPL-SCNC: 3.4 MMOL/L (ref 3.5–5.3)
PROT SERPL-MCNC: 7.6 G/DL (ref 6.4–8.4)
RBC # BLD AUTO: 3.55 MILLION/UL (ref 3.81–5.12)
SODIUM SERPL-SCNC: 140 MMOL/L (ref 135–147)
SODIUM SERPL-SCNC: 142 MMOL/L (ref 135–147)
WBC # BLD AUTO: 9.9 THOUSAND/UL (ref 4.31–10.16)

## 2025-07-13 PROCEDURE — 80048 BASIC METABOLIC PNL TOTAL CA: CPT | Performed by: EMERGENCY MEDICINE

## 2025-07-13 PROCEDURE — 36415 COLL VENOUS BLD VENIPUNCTURE: CPT | Performed by: EMERGENCY MEDICINE

## 2025-07-13 PROCEDURE — 74176 CT ABD & PELVIS W/O CONTRAST: CPT

## 2025-07-13 PROCEDURE — 99285 EMERGENCY DEPT VISIT HI MDM: CPT

## 2025-07-13 PROCEDURE — 80053 COMPREHEN METABOLIC PANEL: CPT | Performed by: EMERGENCY MEDICINE

## 2025-07-13 PROCEDURE — 96361 HYDRATE IV INFUSION ADD-ON: CPT

## 2025-07-13 PROCEDURE — 99285 EMERGENCY DEPT VISIT HI MDM: CPT | Performed by: EMERGENCY MEDICINE

## 2025-07-13 PROCEDURE — 85025 COMPLETE CBC W/AUTO DIFF WBC: CPT | Performed by: EMERGENCY MEDICINE

## 2025-07-13 PROCEDURE — 84484 ASSAY OF TROPONIN QUANT: CPT | Performed by: EMERGENCY MEDICINE

## 2025-07-13 PROCEDURE — 96374 THER/PROPH/DIAG INJ IV PUSH: CPT

## 2025-07-13 PROCEDURE — 96375 TX/PRO/DX INJ NEW DRUG ADDON: CPT

## 2025-07-13 PROCEDURE — 71250 CT THORAX DX C-: CPT

## 2025-07-13 PROCEDURE — 93005 ELECTROCARDIOGRAM TRACING: CPT

## 2025-07-13 RX ORDER — MAGNESIUM HYDROXIDE/ALUMINUM HYDROXICE/SIMETHICONE 120; 1200; 1200 MG/30ML; MG/30ML; MG/30ML
30 SUSPENSION ORAL ONCE
Status: COMPLETED | OUTPATIENT
Start: 2025-07-13 | End: 2025-07-13

## 2025-07-13 RX ORDER — SUCRALFATE 1 G/1
1 TABLET ORAL ONCE
Status: DISCONTINUED | OUTPATIENT
Start: 2025-07-13 | End: 2025-07-13

## 2025-07-13 RX ORDER — FAMOTIDINE 20 MG/1
20 TABLET, FILM COATED ORAL DAILY
Qty: 30 TABLET | Refills: 0 | Status: SHIPPED | OUTPATIENT
Start: 2025-07-13 | End: 2025-08-12

## 2025-07-13 RX ORDER — FAMOTIDINE 20 MG/1
20 TABLET, FILM COATED ORAL DAILY
Qty: 30 TABLET | Refills: 0 | Status: SHIPPED | OUTPATIENT
Start: 2025-07-13

## 2025-07-13 RX ORDER — NITROGLYCERIN 0.4 MG/1
0.4 TABLET SUBLINGUAL ONCE
Status: DISCONTINUED | OUTPATIENT
Start: 2025-07-13 | End: 2025-07-13

## 2025-07-13 RX ORDER — SUCRALFATE 1 G/1
1 TABLET ORAL ONCE
Status: COMPLETED | OUTPATIENT
Start: 2025-07-13 | End: 2025-07-13

## 2025-07-13 RX ORDER — FAMOTIDINE 10 MG/ML
20 INJECTION, SOLUTION INTRAVENOUS ONCE
Status: COMPLETED | OUTPATIENT
Start: 2025-07-13 | End: 2025-07-13

## 2025-07-13 RX ORDER — PANTOPRAZOLE SODIUM 40 MG/10ML
40 INJECTION, POWDER, LYOPHILIZED, FOR SOLUTION INTRAVENOUS ONCE
Status: COMPLETED | OUTPATIENT
Start: 2025-07-13 | End: 2025-07-13

## 2025-07-13 RX ADMIN — SODIUM CHLORIDE 1000 ML: 0.9 INJECTION, SOLUTION INTRAVENOUS at 19:32

## 2025-07-13 RX ADMIN — SUCRALFATE 1 G: 1 TABLET ORAL at 22:17

## 2025-07-13 RX ADMIN — ALUMINUM HYDROXIDE, MAGNESIUM HYDROXIDE, AND DIMETHICONE 30 ML: 200; 20; 200 SUSPENSION ORAL at 18:36

## 2025-07-13 RX ADMIN — FAMOTIDINE 20 MG: 10 INJECTION, SOLUTION INTRAVENOUS at 18:42

## 2025-07-13 RX ADMIN — PANTOPRAZOLE SODIUM 40 MG: 40 INJECTION, POWDER, FOR SOLUTION INTRAVENOUS at 18:42

## 2025-07-14 VITALS
OXYGEN SATURATION: 97 % | RESPIRATION RATE: 20 BRPM | HEART RATE: 60 BPM | DIASTOLIC BLOOD PRESSURE: 75 MMHG | TEMPERATURE: 97.5 F | SYSTOLIC BLOOD PRESSURE: 130 MMHG

## 2025-07-14 LAB
ATRIAL RATE: 60 BPM
PR INTERVAL: 310 MS
QRS AXIS: -47 DEGREES
QRSD INTERVAL: 98 MS
QT INTERVAL: 454 MS
QTC INTERVAL: 454 MS
T WAVE AXIS: 48 DEGREES
VENTRICULAR RATE: 60 BPM

## 2025-07-14 PROCEDURE — 93010 ELECTROCARDIOGRAM REPORT: CPT | Performed by: INTERNAL MEDICINE

## 2025-07-14 NOTE — DISCHARGE INSTRUCTIONS
CT IMPRESSION:     1.  Right lower lobe opacities, atelectasis versus pneumonia  2.  Findings of esophagitis, new from prior  3.  No acute abdominopelvic abnormality

## 2025-07-14 NOTE — ED CARE HANDOFF
Emergency Department Sign Out Note        Sign out and transfer of care from Dr. Ortega. See Separate Emergency Department note.     The patient, Marisol Otoole, was evaluated by the previous provider for chest pain.    Workup Completed:  CT IMPRESSION:     1.  Right lower lobe opacities, atelectasis versus pneumonia  2.  Findings of esophagitis, new from prior  3.  No acute abdominopelvic abnormality       ED Course / Workup Pending (followup):  Patient's CT demonstrating findings consistent with esophagitis which is likely the source of patient's symptoms.  Patient CT also notes right lower lobe opacities.  Patient denies any cough or congestion.  Patient denies any fever and is afebrile upon arrival to the emergency room.  Patient does not have any signs or symptoms of pneumonia so this likely represents atelectasis.    On reassessment, the patient states her pain has resolved at present.    I discussed this with the patient and emphasized the need for follow-up and return to the emergency room any development of the symptoms.  I discussed the need for contacting cardiology to recheck INR considering the need for acid suppression.      HEART Risk Score      Flowsheet Row Most Recent Value   Heart Score Risk Calculator    History 0 Filed at: 07/13/2025 2314   ECG 1 Filed at: 07/13/2025 2314   Age 2 Filed at: 07/13/2025 2314   Risk Factors 2 Filed at: 07/13/2025 2314   Troponin 0 Filed at: 07/13/2025 2314   HEART Score 5 Filed at: 07/13/2025 2314          No data recorded                             Procedures  Medical Decision Making  Amount and/or Complexity of Data Reviewed  Labs: ordered.  Radiology: ordered.    Risk  OTC drugs.  Prescription drug management.            Disposition  Final diagnoses:   Chest pain     Time reflects when diagnosis was documented in both MDM as applicable and the Disposition within this note       Time User Action Codes Description Comment    7/13/2025 11:15 PM Cyn Ortega  Add [R07.9] Chest pain           ED Disposition       ED Disposition   Discharge    Condition   Stable    Date/Time   Sun Jul 13, 2025 2318    Comment   Marisol PADMA Otoole discharge to home/self care.                   Follow-up Information       Follow up With Specialties Details Why Contact Info Additional Information    [facility] In 1 week  [address] [place] [id number]  [phone]       [facility] Cardiology [facility] Schedule an appointment as soon as possible for a visit   [address]  [facility] [address]  [facility] [id number] [phone] [facility], [address], [facility] [address], [place], [place], [address] [phone]    [facility] Emergency Department Emergency Medicine  If symptoms worsen [address] [facility] [id number] [phone] [facility] Emergency Department, [address], [place], [address]          Patient's Medications   Discharge Prescriptions    FAMOTIDINE (PEPCID) 20 MG TABLET    Take 1 tablet (20 mg total) by mouth daily       Start Date: 7/13/2025 End Date: 8/12/2025       Order Dose: 20 mg       Quantity: 30 tablet    Refills: 0    FAMOTIDINE (PEPCID) 20 MG TABLET    Take 1 tablet (20 mg total) by mouth daily       Start Date: 7/13/2025 End Date: --       Order Dose: 20 mg       Quantity: 30 tablet    Refills: 0     No discharge procedures on file.       ED Provider  Electronically Signed by     Daryl Eric MD  07/14/25 0122

## 2025-07-14 NOTE — ED NOTES
Pt is currently placed on observation; as she is expecting her friend to pick her up later this morning at 7 am.     Megan Cheema RN  07/14/25 0470

## 2025-07-15 ENCOUNTER — RESULTS FOLLOW-UP (OUTPATIENT)
Dept: FAMILY MEDICINE CLINIC | Facility: CLINIC | Age: 85
End: 2025-07-15

## 2025-07-16 ENCOUNTER — NURSE TRIAGE (OUTPATIENT)
Age: 85
End: 2025-07-16

## 2025-07-16 NOTE — TELEPHONE ENCOUNTER
"*Phone call received by patients home health nurse Carlie- [phone]    REASON FOR CONVERSATION: Nausea    SYMPTOMS: Nausea started this morning, decreased appetite. Had diarrhea 2x yesterday.    OTHER HEALTH INFORMATION: Patient recently in ER for GERD. Taking levaquin for UTI    PROTOCOL DISPOSITION: Callback by PCP Today    CARE ADVICE PROVIDED: ensure to eat something prior to taking antibiotic, continue to stay hydrated, eat small bland meals, call back if sx worsen    PRACTICE FOLLOW-UP: call patient with any further recommendations. Oral abx started on 7/14/25 and ordered for 3 days.      Reason for Disposition   Taking prescription medication that could cause nausea (e.g., narcotics/opiates, antibiotics, OCPs, many others)    Answer Assessment - Initial Assessment Questions  1. NAUSEA SEVERITY: \"How bad is the nausea?\" (e.g., mild, moderate, severe; dehydration, weight loss)      Mild   2. ONSET: \"When did the nausea begin?\"      Started this morning   3. VOMITING: \"Any vomiting?\" If Yes, ask: \"How many times today?\"      Denies   5. CAUSE: \"What do you think is causing the nausea?\"      Unsure     Had diarrhea 2x yesterday    Protocols used: Nausea-Adult-OH    "

## 2025-07-17 ENCOUNTER — REMOTE DEVICE CLINIC VISIT (OUTPATIENT)
Dept: CARDIOLOGY CLINIC | Facility: CLINIC | Age: 85
End: 2025-07-17
Payer: MEDICARE

## 2025-07-17 DIAGNOSIS — Z95.0 CARDIAC PACEMAKER IN SITU: Primary | ICD-10-CM

## 2025-07-17 DIAGNOSIS — R11.0 NAUSEA: Primary | ICD-10-CM

## 2025-07-17 PROCEDURE — 93296 REM INTERROG EVL PM/IDS: CPT | Performed by: INTERNAL MEDICINE

## 2025-07-17 PROCEDURE — 93294 REM INTERROG EVL PM/LDLS PM: CPT | Performed by: INTERNAL MEDICINE

## 2025-07-17 RX ORDER — ONDANSETRON 4 MG/1
4 TABLET, ORALLY DISINTEGRATING ORAL EVERY 6 HOURS PRN
Qty: 20 TABLET | Refills: 0 | Status: SHIPPED | OUTPATIENT
Start: 2025-07-17

## 2025-07-17 NOTE — PROGRESS NOTES
Results for orders placed or performed in visit on 07/17/25   Cardiac EP device report    Narrative    MDT DC PM/ACTIVE SYSTEM IS MRI CONDITIONAL  CARELINK TRANSMISSION: BATTERY VOLTAGE ADEQUATE (8.5 YRS). AP-99%, -0.3%. ALL AVAILABLE LEAD PARAMETERS WITHIN NORMAL LIMITS. NO SIGNIFICANT HIGH RATE EPISODES. NORMAL DEVICE FUNCTION. GV

## 2025-07-18 ENCOUNTER — TELEPHONE (OUTPATIENT)
Age: 85
End: 2025-07-18

## 2025-07-18 ENCOUNTER — TELEPHONE (OUTPATIENT)
Dept: LAB | Facility: HOSPITAL | Age: 85
End: 2025-07-18

## 2025-07-18 DIAGNOSIS — M54.16 RADICULOPATHY, LUMBAR REGION: Primary | ICD-10-CM

## 2025-07-18 NOTE — TELEPHONE ENCOUNTER
Patient called rx refill line inquiring an increase on Lyrica. Currently taking 25 mg's, BID. Per patient discussion with provider, asking to have increased to 50 mg's. Please send prescription to [id number].

## 2025-07-18 NOTE — TELEPHONE ENCOUNTER
Caller: christal Damon    Doctor: Dr. Montero    Reason for call: pt returning the nurse's call    Call back#: [phone]       Does she have medication remaining or has she been out for awhile? Pt has been out medication for awhile and she does not remember when she took her last dose of the lyrica     Per epic last filled 5/12 30 day supply  Is she taking twice a day?  YES  Any side effects noted?  NO    [id number] [place] - [address] [phone]

## 2025-07-18 NOTE — TELEPHONE ENCOUNTER
Will address with MG when she returns to office Monday  Pt needs to take consistently before dosage can be increased.  Please advise on lyrica refill request

## 2025-07-18 NOTE — TELEPHONE ENCOUNTER
Lm for pt to cb  Does she have medication remaining or has she been out for awhile?    Per epic last filled 5/12 30 day supply  Is she taking twice a day?  Any side effects noted?

## 2025-07-19 RX ORDER — PREGABALIN 25 MG/1
25 CAPSULE ORAL 2 TIMES DAILY
Qty: 60 CAPSULE | Refills: 0 | Status: SHIPPED | OUTPATIENT
Start: 2025-07-19

## 2025-07-19 NOTE — TELEPHONE ENCOUNTER
Need to start low and build up, anytime she runs out we will need to start all over.    I will send over pregabalin 25 mg capsule she needs to take it consistently for it to provide benefit if I just increase the dosage she is likely to experience side effects be dizzy, drowsy potentially fall.    So if she is tolerating the 25 mg twice a day I see she has an office visit scheduled in August we can discuss at that time if it is appropriate for her to go up she also has chronic kidney disease so it we will also restrict how high we can go up on this medication.

## 2025-07-24 ENCOUNTER — APPOINTMENT (OUTPATIENT)
Dept: LAB | Facility: HOSPITAL | Age: 85
End: 2025-07-24
Payer: MEDICARE

## 2025-07-24 DIAGNOSIS — I48.0 PAROXYSMAL ATRIAL FIBRILLATION (HCC): ICD-10-CM

## 2025-07-24 LAB
INR PPP: 3.1 (ref 0.85–1.19)
PROTHROMBIN TIME: 31.6 SECONDS (ref 12.3–15)

## 2025-07-24 PROCEDURE — 85610 PROTHROMBIN TIME: CPT

## 2025-07-24 PROCEDURE — 36415 COLL VENOUS BLD VENIPUNCTURE: CPT

## 2025-07-25 ENCOUNTER — ANTICOAG VISIT (OUTPATIENT)
Dept: CARDIOLOGY CLINIC | Facility: CLINIC | Age: 85
End: 2025-07-25

## 2025-07-25 NOTE — PROGRESS NOTES
Precious. Advised sd and retest in 1 week. She is set up with the mobile lab for 7/31  
01-Oct-2017 05:31

## 2025-07-30 ENCOUNTER — TELEPHONE (OUTPATIENT)
Age: 85
End: 2025-07-30

## 2025-07-30 ENCOUNTER — NURSE TRIAGE (OUTPATIENT)
Age: 85
End: 2025-07-30

## 2025-07-31 ENCOUNTER — ANTICOAG VISIT (OUTPATIENT)
Dept: CARDIOLOGY CLINIC | Facility: CLINIC | Age: 85
End: 2025-07-31

## 2025-07-31 ENCOUNTER — TELEPHONE (OUTPATIENT)
Dept: CARDIOLOGY CLINIC | Facility: CLINIC | Age: 85
End: 2025-07-31

## 2025-07-31 ENCOUNTER — APPOINTMENT (OUTPATIENT)
Dept: LAB | Facility: HOSPITAL | Age: 85
End: 2025-07-31
Payer: MEDICARE

## 2025-07-31 DIAGNOSIS — I48.0 PAROXYSMAL ATRIAL FIBRILLATION (HCC): ICD-10-CM

## 2025-07-31 LAB
INR PPP: 3.27 (ref 0.85–1.19)
PROTHROMBIN TIME: 32.9 SECONDS (ref 12.3–15)

## 2025-07-31 PROCEDURE — 85610 PROTHROMBIN TIME: CPT

## 2025-07-31 PROCEDURE — 36415 COLL VENOUS BLD VENIPUNCTURE: CPT

## 2025-07-31 NOTE — TELEPHONE ENCOUNTER
Patient arrives via [facility].  States she started with chest pain and shortness of breath that started about 30 minutes prior to arrival.  Chest pain/pressure is resolved upon arrival.  Patient is 87% on her prescribed oxygen at 2L/nc.  Pulse ox increased to 95% with oxymask and 5L   Patient is calling, she was asking to come in for a follow up visit as she hasn't been seen since 12/2020  I was going to schedule her in May for a f/u in [facility] but she said she doesn't think she can wait that long  She is having trouble with her breathing, she gets very short of breath on exertion but once she sits down she feels okay  Whenever she checks her oxygen levels she said it always starts at about 80% but ends up going back up to about 93%  She has not been following her inhalers as prescribed as well   Please advise

## 2025-08-01 ENCOUNTER — OFFICE VISIT (OUTPATIENT)
Dept: FAMILY MEDICINE CLINIC | Facility: CLINIC | Age: 85
End: 2025-08-01
Payer: MEDICARE

## 2025-08-01 VITALS
BODY MASS INDEX: 42.05 KG/M2 | HEIGHT: 55 IN | OXYGEN SATURATION: 94 % | HEART RATE: 60 BPM | DIASTOLIC BLOOD PRESSURE: 64 MMHG | SYSTOLIC BLOOD PRESSURE: 130 MMHG

## 2025-08-01 DIAGNOSIS — R35.0 URINE FREQUENCY: Primary | ICD-10-CM

## 2025-08-01 LAB
SL AMB  POCT GLUCOSE, UA: ABNORMAL
SL AMB LEUKOCYTE ESTERASE,UA: 500
SL AMB POCT BILIRUBIN,UA: ABNORMAL
SL AMB POCT BLOOD,UA: 200
SL AMB POCT CLARITY,UA: ABNORMAL
SL AMB POCT COLOR,UA: YELLOW
SL AMB POCT KETONES,UA: ABNORMAL
SL AMB POCT NITRITE,UA: ABNORMAL
SL AMB POCT PH,UA: 5.5
SL AMB POCT SPECIFIC GRAVITY,UA: 1.01
SL AMB POCT URINE PROTEIN: 1
SL AMB POCT UROBILINOGEN: 3.5

## 2025-08-01 PROCEDURE — 99213 OFFICE O/P EST LOW 20 MIN: CPT

## 2025-08-01 PROCEDURE — 87086 URINE CULTURE/COLONY COUNT: CPT

## 2025-08-01 PROCEDURE — 87077 CULTURE AEROBIC IDENTIFY: CPT

## 2025-08-01 PROCEDURE — 81002 URINALYSIS NONAUTO W/O SCOPE: CPT

## 2025-08-01 PROCEDURE — 87186 SC STD MICRODIL/AGAR DIL: CPT

## 2025-08-01 RX ORDER — METHENAMINE HIPPURATE 1000 MG/1
1 TABLET ORAL 2 TIMES DAILY WITH MEALS
Qty: 60 TABLET | Refills: 3 | Status: ON HOLD | OUTPATIENT
Start: 2025-08-01

## 2025-08-01 RX ORDER — PHENAZOPYRIDINE HYDROCHLORIDE 200 MG/1
200 TABLET, FILM COATED ORAL
Qty: 10 TABLET | Refills: 0 | Status: SHIPPED | OUTPATIENT
Start: 2025-08-01 | End: 2025-08-06 | Stop reason: ALTCHOICE

## 2025-08-03 ENCOUNTER — APPOINTMENT (EMERGENCY)
Dept: CT IMAGING | Facility: HOSPITAL | Age: 85
DRG: 205 | End: 2025-08-03
Payer: MEDICARE

## 2025-08-03 ENCOUNTER — HOSPITAL ENCOUNTER (EMERGENCY)
Facility: HOSPITAL | Age: 85
Discharge: HOME | DRG: 205 | End: 2025-08-03
Attending: EMERGENCY MEDICINE
Payer: MEDICARE

## 2025-08-03 ENCOUNTER — NURSE TRIAGE (OUTPATIENT)
Dept: OTHER | Facility: OTHER | Age: 85
End: 2025-08-03

## 2025-08-03 DIAGNOSIS — E78.5 HYPERLIPIDEMIA, UNSPECIFIED HYPERLIPIDEMIA TYPE: ICD-10-CM

## 2025-08-03 LAB — BACTERIA UR CULT: ABNORMAL

## 2025-08-04 ENCOUNTER — TELEPHONE (OUTPATIENT)
Age: 85
End: 2025-08-04

## 2025-08-04 ENCOUNTER — VBI (OUTPATIENT)
Dept: FAMILY MEDICINE CLINIC | Facility: CLINIC | Age: 85
End: 2025-08-04

## 2025-08-05 ENCOUNTER — APPOINTMENT (EMERGENCY)
Dept: CT IMAGING | Facility: HOSPITAL | Age: 85
DRG: 205 | End: 2025-08-05
Payer: MEDICARE

## 2025-08-05 ENCOUNTER — HOSPITAL ENCOUNTER (INPATIENT)
Facility: HOSPITAL | Age: 85
LOS: 6 days | Discharge: NURSING, REHAB OR LONG-TERM CARE | DRG: 205 | End: 2025-08-12
Attending: EMERGENCY MEDICINE | Admitting: FAMILY MEDICINE
Payer: MEDICARE

## 2025-08-05 RX ORDER — SIMVASTATIN 40 MG
40 TABLET ORAL
Qty: 90 TABLET | Refills: 1 | Status: SHIPPED | OUTPATIENT
Start: 2025-08-05

## 2025-08-06 ENCOUNTER — APPOINTMENT (INPATIENT)
Dept: RADIOLOGY | Facility: HOSPITAL | Age: 85
DRG: 205 | End: 2025-08-06
Payer: MEDICARE

## 2025-08-06 PROBLEM — N39.0 UTI (URINARY TRACT INFECTION): Status: ACTIVE | Noted: 2025-08-06

## 2025-08-06 PROBLEM — R54 FRAILTY SYNDROME IN GERIATRIC PATIENT: Status: ACTIVE | Noted: 2025-08-06

## 2025-08-07 ENCOUNTER — TELEPHONE (OUTPATIENT)
Age: 85
End: 2025-08-07

## 2025-08-07 ENCOUNTER — TELEPHONE (OUTPATIENT)
Dept: FAMILY MEDICINE CLINIC | Facility: CLINIC | Age: 85
End: 2025-08-07

## 2025-08-07 PROBLEM — R79.89 ELEVATED BRAIN NATRIURETIC PEPTIDE (BNP) LEVEL: Status: ACTIVE | Noted: 2025-08-07

## 2025-08-08 ENCOUNTER — APPOINTMENT (INPATIENT)
Dept: NON INVASIVE DIAGNOSTICS | Facility: HOSPITAL | Age: 85
DRG: 205 | End: 2025-08-08
Payer: MEDICARE

## 2025-08-08 ENCOUNTER — APPOINTMENT (INPATIENT)
Dept: RADIOLOGY | Facility: HOSPITAL | Age: 85
DRG: 205 | End: 2025-08-08
Payer: MEDICARE

## 2025-08-08 PROBLEM — J96.11 CHRONIC HYPOXIC RESPIRATORY FAILURE, ON HOME OXYGEN THERAPY  (HCC): Status: ACTIVE | Noted: 2025-08-08

## 2025-08-08 PROBLEM — R13.10 ODYNOPHAGIA: Status: ACTIVE | Noted: 2025-08-08

## 2025-08-08 PROBLEM — Z99.81 CHRONIC HYPOXIC RESPIRATORY FAILURE, ON HOME OXYGEN THERAPY  (HCC): Status: ACTIVE | Noted: 2025-08-08

## 2025-08-08 PROBLEM — I35.0 SEVERE AORTIC STENOSIS: Status: ACTIVE | Noted: 2018-10-09

## 2025-08-08 PROBLEM — R13.10 DYSPHAGIA: Status: ACTIVE | Noted: 2025-08-08

## 2025-08-08 PROBLEM — R29.6 FREQUENT FALLS: Status: ACTIVE | Noted: 2023-08-14

## 2025-08-08 PROBLEM — R13.19 ESOPHAGEAL DYSPHAGIA: Status: ACTIVE | Noted: 2025-08-08

## 2025-08-08 PROBLEM — E78.2 MIXED HYPERLIPIDEMIA: Status: ACTIVE | Noted: 2017-12-04

## 2025-08-11 ENCOUNTER — ANESTHESIA (INPATIENT)
Dept: GASTROENTEROLOGY | Facility: HOSPITAL | Age: 85
DRG: 205 | End: 2025-08-11
Payer: MEDICARE

## 2025-08-11 ENCOUNTER — ANESTHESIA EVENT (INPATIENT)
Dept: GASTROENTEROLOGY | Facility: HOSPITAL | Age: 85
DRG: 205 | End: 2025-08-11
Payer: MEDICARE

## 2025-08-11 ENCOUNTER — TELEPHONE (OUTPATIENT)
Dept: CARDIOLOGY CLINIC | Facility: CLINIC | Age: 85
End: 2025-08-11

## 2025-08-11 ENCOUNTER — APPOINTMENT (INPATIENT)
Dept: GASTROENTEROLOGY | Facility: HOSPITAL | Age: 85
DRG: 205 | End: 2025-08-11
Attending: PHYSICIAN ASSISTANT
Payer: MEDICARE

## 2025-08-13 ENCOUNTER — RESULTS FOLLOW-UP (OUTPATIENT)
Dept: GASTROENTEROLOGY | Facility: CLINIC | Age: 85
End: 2025-08-13

## 2025-08-14 ENCOUNTER — TELEPHONE (OUTPATIENT)
Dept: CARDIAC SURGERY | Facility: CLINIC | Age: 85
End: 2025-08-14

## 2025-08-21 ENCOUNTER — TELEPHONE (OUTPATIENT)
Dept: CARDIAC SURGERY | Facility: CLINIC | Age: 85
End: 2025-08-21

## (undated) DEVICE — SUT SILK 0 CT-1 30 IN 424H

## (undated) DEVICE — INTRO SHEATH PEEL AWAY 7FR

## (undated) DEVICE — Device

## (undated) DEVICE — INTENDED FOR TISSUE SEPARATION, AND OTHER PROCEDURES THAT REQUIRE A SHARP SURGICAL BLADE TO PUNCTURE OR CUT.: Brand: BARD-PARKER ® CARBON RIB-BACK BLADES

## (undated) DEVICE — DGW .035 FC J3MM 150CM TEF: Brand: EMERALD

## (undated) DEVICE — THERMOFLECT BLANKET, L, 25EA                               TS THERMOFLECT BLANKET, 48" X 84", SILVER, 5/BG, 5 BG/CS NW: Brand: THERMOFLECT

## (undated) DEVICE — GAUZE SPONGES,USP TYPE VII GAUZE, 12 PLY: Brand: CURITY

## (undated) DEVICE — CARDIO PERI-GROIN: Brand: CONVERTORS

## (undated) DEVICE — GLOVE INDICATOR PI UNDERGLOVE SZ 7 BLUE

## (undated) DEVICE — SCD SEQUENTIAL COMPRESSION COMFORT SLEEVE MEDIUM KNEE LENGTH: Brand: KENDALL SCD

## (undated) DEVICE — DRESSING ALLEVYN LIFE SACRAL 6.75 X 6.5 IN

## (undated) DEVICE — REM POLYHESIVE ADULT PATIENT RETURN ELECTRODE: Brand: VALLEYLAB

## (undated) DEVICE — SLITTER ADJUSTABLE

## (undated) DEVICE — CHLORAPREP HI-LITE 26ML ORANGE

## (undated) DEVICE — STERILE MAJOR GENERAL PACK: Brand: CARDINAL HEALTH

## (undated) DEVICE — X-RAY DETECTABLE SPONGES,16 PLY: Brand: VISTEC

## (undated) DEVICE — HEAVY DUTY TABLE COVER: Brand: CONVERTORS

## (undated) DEVICE — DEFIB ADULT ELECTRODE CARDINAL

## (undated) DEVICE — ADHESIVE SKN CLSR HISTOACRYL FLEX 0.5ML LF

## (undated) DEVICE — 3000CC GUARDIAN II: Brand: GUARDIAN

## (undated) DEVICE — CARDIOVASCULAR SPLIT DRAPE: Brand: CONVERTORS

## (undated) DEVICE — TRAY FOLEY 16FR SURESTEP TEMP SENS URIMETER STAT LOK

## (undated) DEVICE — 3M™ TEGADERM™ TRANSPARENT FILM DRESSING FRAME STYLE, 1626W, 4 IN X 4-3/4 IN (10 CM X 12 CM), 50/CT 4CT/CASE: Brand: 3M™ TEGADERM™